# Patient Record
Sex: FEMALE | Race: WHITE | NOT HISPANIC OR LATINO | Employment: OTHER | URBAN - METROPOLITAN AREA
[De-identification: names, ages, dates, MRNs, and addresses within clinical notes are randomized per-mention and may not be internally consistent; named-entity substitution may affect disease eponyms.]

---

## 2017-01-17 ENCOUNTER — APPOINTMENT (INPATIENT)
Dept: CT IMAGING | Facility: HOSPITAL | Age: 57
DRG: 682 | End: 2017-01-17
Payer: MEDICARE

## 2017-01-17 ENCOUNTER — HOSPITAL ENCOUNTER (INPATIENT)
Facility: HOSPITAL | Age: 57
LOS: 3 days | Discharge: HOME | DRG: 682 | End: 2017-01-20
Attending: EMERGENCY MEDICINE | Admitting: INTERNAL MEDICINE
Payer: MEDICARE

## 2017-01-17 DIAGNOSIS — E86.1 HYPOVOLEMIA: ICD-10-CM

## 2017-01-17 DIAGNOSIS — R63.4 RAPID WEIGHT LOSS: ICD-10-CM

## 2017-01-17 DIAGNOSIS — E87.6 HYPOKALEMIA: Primary | ICD-10-CM

## 2017-01-17 PROBLEM — F31.81 BIPOLAR 2 DISORDER (HCC): Chronic | Status: ACTIVE | Noted: 2017-01-17

## 2017-01-17 PROBLEM — N17.9 ACUTE KIDNEY INJURY (HCC): Status: ACTIVE | Noted: 2017-01-17

## 2017-01-17 PROBLEM — E78.00 HYPERCHOLESTEREMIA: Chronic | Status: ACTIVE | Noted: 2017-01-17

## 2017-01-17 LAB
ALBUMIN SERPL BCP-MCNC: 3 G/DL (ref 3.5–5)
ALP SERPL-CCNC: 178 U/L (ref 46–116)
ALT SERPL W P-5'-P-CCNC: 76 U/L (ref 12–78)
ANION GAP SERPL CALCULATED.3IONS-SCNC: 16 MMOL/L (ref 4–13)
AST SERPL W P-5'-P-CCNC: 64 U/L (ref 5–45)
BASOPHILS # BLD AUTO: 0.03 THOUSANDS/ΜL (ref 0–0.1)
BASOPHILS NFR BLD AUTO: 0 % (ref 0–1)
BILIRUB SERPL-MCNC: 0.3 MG/DL (ref 0.2–1)
BUN SERPL-MCNC: 29 MG/DL (ref 5–25)
CALCIUM SERPL-MCNC: 8.6 MG/DL (ref 8.3–10.1)
CHLORIDE SERPL-SCNC: 102 MMOL/L (ref 100–108)
CO2 SERPL-SCNC: 18 MMOL/L (ref 21–32)
CREAT SERPL-MCNC: 4.19 MG/DL (ref 0.6–1.3)
EOSINOPHIL # BLD AUTO: 0.05 THOUSAND/ΜL (ref 0–0.61)
EOSINOPHIL NFR BLD AUTO: 1 % (ref 0–6)
ERYTHROCYTE [DISTWIDTH] IN BLOOD BY AUTOMATED COUNT: 14.4 % (ref 11.6–15.1)
GFR SERPL CREATININE-BSD FRML MDRD: 11.1 ML/MIN/1.73SQ M
GLUCOSE SERPL-MCNC: 119 MG/DL (ref 65–140)
HCT VFR BLD AUTO: 37.6 % (ref 34.8–46.1)
HGB BLD-MCNC: 13.3 G/DL (ref 11.5–15.4)
LYMPHOCYTES # BLD AUTO: 0.99 THOUSANDS/ΜL (ref 0.6–4.47)
LYMPHOCYTES NFR BLD AUTO: 10 % (ref 14–44)
MAGNESIUM SERPL-MCNC: 1.9 MG/DL (ref 1.6–2.6)
MCH RBC QN AUTO: 31.7 PG (ref 26.8–34.3)
MCHC RBC AUTO-ENTMCNC: 35.4 G/DL (ref 31.4–37.4)
MCV RBC AUTO: 90 FL (ref 82–98)
MONOCYTES # BLD AUTO: 0.64 THOUSAND/ΜL (ref 0.17–1.22)
MONOCYTES NFR BLD AUTO: 7 % (ref 4–12)
NEUTROPHILS # BLD AUTO: 8.13 THOUSANDS/ΜL (ref 1.85–7.62)
NEUTS SEG NFR BLD AUTO: 82 % (ref 43–75)
PLATELET # BLD AUTO: 374 THOUSANDS/UL (ref 149–390)
PMV BLD AUTO: 10.4 FL (ref 8.9–12.7)
POTASSIUM SERPL-SCNC: 1.5 MMOL/L (ref 3.5–5.3)
PROT SERPL-MCNC: 6.3 G/DL (ref 6.4–8.2)
RBC # BLD AUTO: 4.19 MILLION/UL (ref 3.81–5.12)
SODIUM SERPL-SCNC: 136 MMOL/L (ref 136–145)
WBC # BLD AUTO: 9.84 THOUSAND/UL (ref 4.31–10.16)

## 2017-01-17 PROCEDURE — C9113 INJ PANTOPRAZOLE SODIUM, VIA: HCPCS | Performed by: INTERNAL MEDICINE

## 2017-01-17 PROCEDURE — 80053 COMPREHEN METABOLIC PANEL: CPT | Performed by: EMERGENCY MEDICINE

## 2017-01-17 PROCEDURE — 99285 EMERGENCY DEPT VISIT HI MDM: CPT

## 2017-01-17 PROCEDURE — 96361 HYDRATE IV INFUSION ADD-ON: CPT

## 2017-01-17 PROCEDURE — 96365 THER/PROPH/DIAG IV INF INIT: CPT

## 2017-01-17 PROCEDURE — 93005 ELECTROCARDIOGRAM TRACING: CPT

## 2017-01-17 PROCEDURE — 83735 ASSAY OF MAGNESIUM: CPT | Performed by: EMERGENCY MEDICINE

## 2017-01-17 PROCEDURE — 74176 CT ABD & PELVIS W/O CONTRAST: CPT

## 2017-01-17 PROCEDURE — 93005 ELECTROCARDIOGRAM TRACING: CPT | Performed by: EMERGENCY MEDICINE

## 2017-01-17 PROCEDURE — 36415 COLL VENOUS BLD VENIPUNCTURE: CPT | Performed by: EMERGENCY MEDICINE

## 2017-01-17 PROCEDURE — 85025 COMPLETE CBC W/AUTO DIFF WBC: CPT | Performed by: EMERGENCY MEDICINE

## 2017-01-17 RX ORDER — POTASSIUM CHLORIDE 29.8 MG/ML
40 INJECTION INTRAVENOUS ONCE
Status: DISCONTINUED | OUTPATIENT
Start: 2017-01-17 | End: 2017-01-17

## 2017-01-17 RX ORDER — SODIUM CHLORIDE AND POTASSIUM CHLORIDE .9; .15 G/100ML; G/100ML
125 SOLUTION INTRAVENOUS CONTINUOUS
Status: DISCONTINUED | OUTPATIENT
Start: 2017-01-17 | End: 2017-01-18

## 2017-01-17 RX ORDER — POTASSIUM CHLORIDE 14.9 MG/ML
20 INJECTION INTRAVENOUS
Status: COMPLETED | OUTPATIENT
Start: 2017-01-17 | End: 2017-01-18

## 2017-01-17 RX ORDER — POTASSIUM CHLORIDE 20 MEQ/1
20 TABLET, EXTENDED RELEASE ORAL ONCE
Status: COMPLETED | OUTPATIENT
Start: 2017-01-17 | End: 2017-01-17

## 2017-01-17 RX ORDER — POTASSIUM CHLORIDE 14.9 MG/ML
INJECTION INTRAVENOUS
Status: COMPLETED
Start: 2017-01-17 | End: 2017-01-17

## 2017-01-17 RX ORDER — ACETAMINOPHEN 325 MG/1
650 TABLET ORAL EVERY 6 HOURS PRN
Status: DISCONTINUED | OUTPATIENT
Start: 2017-01-17 | End: 2017-01-20 | Stop reason: HOSPADM

## 2017-01-17 RX ORDER — PANTOPRAZOLE SODIUM 40 MG/1
40 INJECTION, POWDER, FOR SOLUTION INTRAVENOUS EVERY 12 HOURS SCHEDULED
Status: DISCONTINUED | OUTPATIENT
Start: 2017-01-17 | End: 2017-01-20 | Stop reason: HOSPADM

## 2017-01-17 RX ORDER — ONDANSETRON 2 MG/ML
4 INJECTION INTRAMUSCULAR; INTRAVENOUS EVERY 6 HOURS PRN
Status: DISCONTINUED | OUTPATIENT
Start: 2017-01-17 | End: 2017-01-20 | Stop reason: HOSPADM

## 2017-01-17 RX ORDER — POTASSIUM CHLORIDE 14.9 MG/ML
20 INJECTION INTRAVENOUS
Status: DISCONTINUED | OUTPATIENT
Start: 2017-01-17 | End: 2017-01-18

## 2017-01-17 RX ORDER — HEPARIN SODIUM 5000 [USP'U]/ML
5000 INJECTION, SOLUTION INTRAVENOUS; SUBCUTANEOUS EVERY 8 HOURS SCHEDULED
Status: DISCONTINUED | OUTPATIENT
Start: 2017-01-17 | End: 2017-01-20 | Stop reason: HOSPADM

## 2017-01-17 RX ORDER — SIMETHICONE 80 MG
80 TABLET,CHEWABLE ORAL 4 TIMES DAILY PRN
Status: DISCONTINUED | OUTPATIENT
Start: 2017-01-17 | End: 2017-01-20 | Stop reason: HOSPADM

## 2017-01-17 RX ADMIN — POTASSIUM CHLORIDE 20 MEQ: 200 INJECTION, SOLUTION INTRAVENOUS at 21:00

## 2017-01-17 RX ADMIN — SODIUM CHLORIDE 500 ML: 0.9 INJECTION, SOLUTION INTRAVENOUS at 14:24

## 2017-01-17 RX ADMIN — POTASSIUM CHLORIDE 20 MEQ: 200 INJECTION, SOLUTION INTRAVENOUS at 18:19

## 2017-01-17 RX ADMIN — SODIUM CHLORIDE 1000 ML: 0.9 INJECTION, SOLUTION INTRAVENOUS at 15:06

## 2017-01-17 RX ADMIN — IOHEXOL 50 ML: 240 INJECTION, SOLUTION INTRATHECAL; INTRAVASCULAR; INTRAVENOUS; ORAL at 19:00

## 2017-01-17 RX ADMIN — POTASSIUM CHLORIDE 20 MEQ: 200 INJECTION, SOLUTION INTRAVENOUS at 15:03

## 2017-01-17 RX ADMIN — HEPARIN SODIUM 5000 UNITS: 5000 INJECTION, SOLUTION INTRAVENOUS; SUBCUTANEOUS at 21:07

## 2017-01-17 RX ADMIN — POTASSIUM CHLORIDE 20 MEQ: 14.9 INJECTION INTRAVENOUS at 15:03

## 2017-01-17 RX ADMIN — SODIUM CHLORIDE AND POTASSIUM CHLORIDE 125 ML/HR: .9; .15 SOLUTION INTRAVENOUS at 18:19

## 2017-01-17 RX ADMIN — PANTOPRAZOLE SODIUM 40 MG: 40 INJECTION, POWDER, FOR SOLUTION INTRAVENOUS at 21:00

## 2017-01-17 RX ADMIN — POTASSIUM CHLORIDE 20 MEQ: 1500 TABLET, EXTENDED RELEASE ORAL at 15:02

## 2017-01-18 PROBLEM — E87.29 METABOLIC ACIDOSIS, INCREASED ANION GAP: Status: ACTIVE | Noted: 2017-01-18

## 2017-01-18 PROBLEM — E87.2 METABOLIC ACIDOSIS, INCREASED ANION GAP: Status: ACTIVE | Noted: 2017-01-18

## 2017-01-18 LAB
ANION GAP SERPL CALCULATED.3IONS-SCNC: 11 MMOL/L (ref 4–13)
ANION GAP SERPL CALCULATED.3IONS-SCNC: 12 MMOL/L (ref 4–13)
ANION GAP SERPL CALCULATED.3IONS-SCNC: 14 MMOL/L (ref 4–13)
BACTERIA UR QL AUTO: ABNORMAL /HPF
BILIRUB UR QL STRIP: NEGATIVE
BUN SERPL-MCNC: 19 MG/DL (ref 5–25)
BUN SERPL-MCNC: 25 MG/DL (ref 5–25)
BUN SERPL-MCNC: 26 MG/DL (ref 5–25)
C DIFF TOX GENS STL QL NAA+PROBE: NORMAL
CALCIUM SERPL-MCNC: 7.6 MG/DL (ref 8.3–10.1)
CALCIUM SERPL-MCNC: 7.7 MG/DL (ref 8.3–10.1)
CALCIUM SERPL-MCNC: 7.8 MG/DL (ref 8.3–10.1)
CHLORIDE SERPL-SCNC: 106 MMOL/L (ref 100–108)
CHLORIDE SERPL-SCNC: 112 MMOL/L (ref 100–108)
CHLORIDE SERPL-SCNC: 112 MMOL/L (ref 100–108)
CLARITY UR: CLEAR
CO2 SERPL-SCNC: 16 MMOL/L (ref 21–32)
CO2 SERPL-SCNC: 18 MMOL/L (ref 21–32)
CO2 SERPL-SCNC: 19 MMOL/L (ref 21–32)
COLOR UR: YELLOW
CREAT SERPL-MCNC: 3.01 MG/DL (ref 0.6–1.3)
CREAT SERPL-MCNC: 3.28 MG/DL (ref 0.6–1.3)
CREAT SERPL-MCNC: 3.53 MG/DL (ref 0.6–1.3)
ERYTHROCYTE [DISTWIDTH] IN BLOOD BY AUTOMATED COUNT: 14.9 % (ref 11.6–15.1)
GFR SERPL CREATININE-BSD FRML MDRD: 13.5 ML/MIN/1.73SQ M
GFR SERPL CREATININE-BSD FRML MDRD: 14.7 ML/MIN/1.73SQ M
GFR SERPL CREATININE-BSD FRML MDRD: 16.2 ML/MIN/1.73SQ M
GLUCOSE SERPL-MCNC: 102 MG/DL (ref 65–140)
GLUCOSE SERPL-MCNC: 170 MG/DL (ref 65–140)
GLUCOSE SERPL-MCNC: 86 MG/DL (ref 65–140)
GLUCOSE UR STRIP-MCNC: NEGATIVE MG/DL
HCT VFR BLD AUTO: 33.4 % (ref 34.8–46.1)
HGB BLD-MCNC: 11.5 G/DL (ref 11.5–15.4)
HGB UR QL STRIP.AUTO: ABNORMAL
KETONES UR STRIP-MCNC: NEGATIVE MG/DL
LACTATE SERPL-SCNC: 1.6 MMOL/L (ref 0.5–2)
LEUKOCYTE ESTERASE UR QL STRIP: NEGATIVE
MAGNESIUM SERPL-MCNC: 1.9 MG/DL (ref 1.6–2.6)
MCH RBC QN AUTO: 31.9 PG (ref 26.8–34.3)
MCHC RBC AUTO-ENTMCNC: 34.4 G/DL (ref 31.4–37.4)
MCV RBC AUTO: 93 FL (ref 82–98)
NITRITE UR QL STRIP: NEGATIVE
NON-SQ EPI CELLS URNS QL MICRO: ABNORMAL /HPF
PH UR STRIP.AUTO: 6 [PH] (ref 4.5–8)
PLATELET # BLD AUTO: 317 THOUSANDS/UL (ref 149–390)
PLATELET # BLD AUTO: 318 THOUSANDS/UL (ref 149–390)
PMV BLD AUTO: 10.7 FL (ref 8.9–12.7)
PMV BLD AUTO: 10.9 FL (ref 8.9–12.7)
POTASSIUM SERPL-SCNC: 1.7 MMOL/L (ref 3.5–5.3)
POTASSIUM SERPL-SCNC: 2.4 MMOL/L (ref 3.5–5.3)
POTASSIUM SERPL-SCNC: 2.7 MMOL/L (ref 3.5–5.3)
PROT UR STRIP-MCNC: NEGATIVE MG/DL
RBC # BLD AUTO: 3.6 MILLION/UL (ref 3.81–5.12)
RBC #/AREA URNS AUTO: ABNORMAL /HPF
SODIUM SERPL-SCNC: 136 MMOL/L (ref 136–145)
SODIUM SERPL-SCNC: 142 MMOL/L (ref 136–145)
SODIUM SERPL-SCNC: 142 MMOL/L (ref 136–145)
SP GR UR STRIP.AUTO: <=1.005 (ref 1–1.03)
TSH SERPL DL<=0.05 MIU/L-ACNC: 2.3 UIU/ML (ref 0.36–3.74)
UROBILINOGEN UR QL STRIP.AUTO: 0.2 E.U./DL
WBC # BLD AUTO: 6.14 THOUSAND/UL (ref 4.31–10.16)
WBC #/AREA URNS AUTO: ABNORMAL /HPF

## 2017-01-18 PROCEDURE — 87177 OVA AND PARASITES SMEARS: CPT | Performed by: INTERNAL MEDICINE

## 2017-01-18 PROCEDURE — 83605 ASSAY OF LACTIC ACID: CPT | Performed by: INTERNAL MEDICINE

## 2017-01-18 PROCEDURE — 85049 AUTOMATED PLATELET COUNT: CPT | Performed by: INTERNAL MEDICINE

## 2017-01-18 PROCEDURE — 83735 ASSAY OF MAGNESIUM: CPT | Performed by: INTERNAL MEDICINE

## 2017-01-18 PROCEDURE — 80048 BASIC METABOLIC PNL TOTAL CA: CPT | Performed by: INTERNAL MEDICINE

## 2017-01-18 PROCEDURE — 87077 CULTURE AEROBIC IDENTIFY: CPT | Performed by: INTERNAL MEDICINE

## 2017-01-18 PROCEDURE — C9113 INJ PANTOPRAZOLE SODIUM, VIA: HCPCS | Performed by: INTERNAL MEDICINE

## 2017-01-18 PROCEDURE — 81001 URINALYSIS AUTO W/SCOPE: CPT | Performed by: INTERNAL MEDICINE

## 2017-01-18 PROCEDURE — 87493 C DIFF AMPLIFIED PROBE: CPT | Performed by: INTERNAL MEDICINE

## 2017-01-18 PROCEDURE — 87086 URINE CULTURE/COLONY COUNT: CPT | Performed by: INTERNAL MEDICINE

## 2017-01-18 PROCEDURE — 87186 SC STD MICRODIL/AGAR DIL: CPT | Performed by: INTERNAL MEDICINE

## 2017-01-18 PROCEDURE — 84443 ASSAY THYROID STIM HORMONE: CPT | Performed by: INTERNAL MEDICINE

## 2017-01-18 PROCEDURE — 87209 SMEAR COMPLEX STAIN: CPT | Performed by: INTERNAL MEDICINE

## 2017-01-18 PROCEDURE — 85027 COMPLETE CBC AUTOMATED: CPT | Performed by: INTERNAL MEDICINE

## 2017-01-18 PROCEDURE — 82705 FATS/LIPIDS FECES QUAL: CPT | Performed by: INTERNAL MEDICINE

## 2017-01-18 PROCEDURE — 80048 BASIC METABOLIC PNL TOTAL CA: CPT | Performed by: NURSE PRACTITIONER

## 2017-01-18 RX ORDER — POTASSIUM CHLORIDE 29.8 MG/ML
40 INJECTION INTRAVENOUS
Status: DISCONTINUED | OUTPATIENT
Start: 2017-01-18 | End: 2017-01-18

## 2017-01-18 RX ORDER — LAMOTRIGINE 100 MG/1
200 TABLET ORAL
Status: DISCONTINUED | OUTPATIENT
Start: 2017-01-19 | End: 2017-01-20 | Stop reason: HOSPADM

## 2017-01-18 RX ORDER — CLONAZEPAM 0.5 MG/1
0.5 TABLET ORAL 3 TIMES DAILY PRN
Status: DISCONTINUED | OUTPATIENT
Start: 2017-01-18 | End: 2017-01-18

## 2017-01-18 RX ORDER — POTASSIUM CHLORIDE 14.9 MG/ML
20 INJECTION INTRAVENOUS ONCE
Status: COMPLETED | OUTPATIENT
Start: 2017-01-18 | End: 2017-01-18

## 2017-01-18 RX ORDER — POTASSIUM CHLORIDE 14.9 MG/ML
20 INJECTION INTRAVENOUS
Status: COMPLETED | OUTPATIENT
Start: 2017-01-18 | End: 2017-01-19

## 2017-01-18 RX ORDER — SODIUM CHLORIDE, SODIUM LACTATE, POTASSIUM CHLORIDE, CALCIUM CHLORIDE 600; 310; 30; 20 MG/100ML; MG/100ML; MG/100ML; MG/100ML
100 INJECTION, SOLUTION INTRAVENOUS CONTINUOUS
Status: DISCONTINUED | OUTPATIENT
Start: 2017-01-18 | End: 2017-01-19

## 2017-01-18 RX ORDER — CLONAZEPAM 0.5 MG/1
0.5 TABLET ORAL 4 TIMES DAILY
Status: DISCONTINUED | OUTPATIENT
Start: 2017-01-18 | End: 2017-01-20 | Stop reason: HOSPADM

## 2017-01-18 RX ORDER — POTASSIUM CHLORIDE 20 MEQ/1
40 TABLET, EXTENDED RELEASE ORAL ONCE
Status: COMPLETED | OUTPATIENT
Start: 2017-01-18 | End: 2017-01-18

## 2017-01-18 RX ORDER — POTASSIUM CHLORIDE 29.8 MG/ML
40 INJECTION INTRAVENOUS ONCE
Status: DISCONTINUED | OUTPATIENT
Start: 2017-01-18 | End: 2017-01-18 | Stop reason: CLARIF

## 2017-01-18 RX ORDER — LAMOTRIGINE 100 MG/1
200 TABLET ORAL DAILY
Status: DISCONTINUED | OUTPATIENT
Start: 2017-01-18 | End: 2017-01-18

## 2017-01-18 RX ORDER — CLOMIPRAMINE HYDROCHLORIDE 25 MG/1
150 CAPSULE ORAL
Status: DISCONTINUED | OUTPATIENT
Start: 2017-01-18 | End: 2017-01-20 | Stop reason: HOSPADM

## 2017-01-18 RX ORDER — POTASSIUM CHLORIDE 14.9 MG/ML
20 INJECTION INTRAVENOUS
Status: COMPLETED | OUTPATIENT
Start: 2017-01-18 | End: 2017-01-18

## 2017-01-18 RX ORDER — POTASSIUM CHLORIDE 20 MEQ/1
40 TABLET, EXTENDED RELEASE ORAL 2 TIMES DAILY
Status: COMPLETED | OUTPATIENT
Start: 2017-01-18 | End: 2017-01-18

## 2017-01-18 RX ADMIN — HEPARIN SODIUM 5000 UNITS: 5000 INJECTION, SOLUTION INTRAVENOUS; SUBCUTANEOUS at 14:10

## 2017-01-18 RX ADMIN — CLOMIPRAMINE HYDROCHLORIDE 150 MG: 25 CAPSULE ORAL at 21:36

## 2017-01-18 RX ADMIN — HEPARIN SODIUM 5000 UNITS: 5000 INJECTION, SOLUTION INTRAVENOUS; SUBCUTANEOUS at 05:56

## 2017-01-18 RX ADMIN — POTASSIUM CHLORIDE 20 MEQ: 200 INJECTION, SOLUTION INTRAVENOUS at 01:41

## 2017-01-18 RX ADMIN — POTASSIUM CHLORIDE 40 MEQ: 1500 TABLET, EXTENDED RELEASE ORAL at 08:57

## 2017-01-18 RX ADMIN — CLONAZEPAM 0.5 MG: 0.5 TABLET ORAL at 21:33

## 2017-01-18 RX ADMIN — PANTOPRAZOLE SODIUM 40 MG: 40 INJECTION, POWDER, FOR SOLUTION INTRAVENOUS at 20:33

## 2017-01-18 RX ADMIN — QUETIAPINE FUMARATE 700 MG: 300 TABLET, FILM COATED ORAL at 21:33

## 2017-01-18 RX ADMIN — LAMOTRIGINE 200 MG: 100 TABLET ORAL at 10:44

## 2017-01-18 RX ADMIN — POTASSIUM CHLORIDE 20 MEQ: 200 INJECTION, SOLUTION INTRAVENOUS at 02:00

## 2017-01-18 RX ADMIN — POTASSIUM CHLORIDE 40 MEQ: 1500 TABLET, EXTENDED RELEASE ORAL at 01:41

## 2017-01-18 RX ADMIN — POTASSIUM CHLORIDE 40 MEQ: 1500 TABLET, EXTENDED RELEASE ORAL at 17:15

## 2017-01-18 RX ADMIN — POTASSIUM CHLORIDE 20 MEQ: 200 INJECTION, SOLUTION INTRAVENOUS at 09:00

## 2017-01-18 RX ADMIN — HEPARIN SODIUM 5000 UNITS: 5000 INJECTION, SOLUTION INTRAVENOUS; SUBCUTANEOUS at 21:33

## 2017-01-18 RX ADMIN — POTASSIUM CHLORIDE 20 MEQ: 200 INJECTION, SOLUTION INTRAVENOUS at 20:33

## 2017-01-18 RX ADMIN — SODIUM CHLORIDE, SODIUM LACTATE, POTASSIUM CHLORIDE, AND CALCIUM CHLORIDE 100 ML/HR: .6; .31; .03; .02 INJECTION, SOLUTION INTRAVENOUS at 08:54

## 2017-01-18 RX ADMIN — POTASSIUM CHLORIDE 20 MEQ: 200 INJECTION, SOLUTION INTRAVENOUS at 10:42

## 2017-01-18 RX ADMIN — SODIUM CHLORIDE, SODIUM LACTATE, POTASSIUM CHLORIDE, AND CALCIUM CHLORIDE 100 ML/HR: .6; .31; .03; .02 INJECTION, SOLUTION INTRAVENOUS at 19:45

## 2017-01-18 RX ADMIN — PANTOPRAZOLE SODIUM 40 MG: 40 INJECTION, POWDER, FOR SOLUTION INTRAVENOUS at 08:57

## 2017-01-18 RX ADMIN — POTASSIUM CHLORIDE 20 MEQ: 200 INJECTION, SOLUTION INTRAVENOUS at 22:41

## 2017-01-18 RX ADMIN — SODIUM CHLORIDE AND POTASSIUM CHLORIDE 125 ML/HR: .9; .15 SOLUTION INTRAVENOUS at 03:07

## 2017-01-19 PROBLEM — E43 SEVERE PROTEIN-CALORIE MALNUTRITION (HCC): Status: ACTIVE | Noted: 2017-01-19

## 2017-01-19 LAB
ANION GAP SERPL CALCULATED.3IONS-SCNC: 11 MMOL/L (ref 4–13)
ATRIAL RATE: 88 BPM
ATRIAL RATE: 89 BPM
BUN SERPL-MCNC: 17 MG/DL (ref 5–25)
CALCIUM SERPL-MCNC: 8.1 MG/DL (ref 8.3–10.1)
CHLORIDE SERPL-SCNC: 116 MMOL/L (ref 100–108)
CO2 SERPL-SCNC: 16 MMOL/L (ref 21–32)
CREAT SERPL-MCNC: 2.52 MG/DL (ref 0.6–1.3)
CREAT UR-MCNC: <13 MG/DL
GFR SERPL CREATININE-BSD FRML MDRD: 19.9 ML/MIN/1.73SQ M
GLUCOSE SERPL-MCNC: 99 MG/DL (ref 65–140)
P AXIS: 74 DEGREES
P AXIS: 78 DEGREES
PHOSPHATE SERPL-MCNC: 2.1 MG/DL (ref 2.7–4.5)
POTASSIUM SERPL-SCNC: 3.2 MMOL/L (ref 3.5–5.3)
PR INTERVAL: 146 MS
PR INTERVAL: 146 MS
PROT UR-MCNC: 17 MG/DL
PROT/CREAT UR: >1.31 MG/G{CREAT} (ref 0–0.1)
PTH-INTACT SERPL-MCNC: 73.9 PG/ML (ref 14–72)
QRS AXIS: 104 DEGREES
QRS AXIS: 106 DEGREES
QRSD INTERVAL: 124 MS
QRSD INTERVAL: 126 MS
QT INTERVAL: 510 MS
QT INTERVAL: 516 MS
QTC INTERVAL: 617 MS
QTC INTERVAL: 627 MS
SODIUM SERPL-SCNC: 143 MMOL/L (ref 136–145)
T WAVE AXIS: 70 DEGREES
T WAVE AXIS: 71 DEGREES
VENTRICULAR RATE: 88 BPM
VENTRICULAR RATE: 89 BPM

## 2017-01-19 PROCEDURE — C9113 INJ PANTOPRAZOLE SODIUM, VIA: HCPCS | Performed by: INTERNAL MEDICINE

## 2017-01-19 PROCEDURE — 84100 ASSAY OF PHOSPHORUS: CPT | Performed by: INTERNAL MEDICINE

## 2017-01-19 PROCEDURE — 80048 BASIC METABOLIC PNL TOTAL CA: CPT | Performed by: INTERNAL MEDICINE

## 2017-01-19 PROCEDURE — 83970 ASSAY OF PARATHORMONE: CPT | Performed by: INTERNAL MEDICINE

## 2017-01-19 PROCEDURE — 82570 ASSAY OF URINE CREATININE: CPT | Performed by: INTERNAL MEDICINE

## 2017-01-19 PROCEDURE — 84156 ASSAY OF PROTEIN URINE: CPT | Performed by: INTERNAL MEDICINE

## 2017-01-19 RX ORDER — POTASSIUM CHLORIDE 20 MEQ/1
40 TABLET, EXTENDED RELEASE ORAL
Status: COMPLETED | OUTPATIENT
Start: 2017-01-19 | End: 2017-01-19

## 2017-01-19 RX ORDER — CIPROFLOXACIN 500 MG/1
500 TABLET, FILM COATED ORAL EVERY 24 HOURS
Status: DISCONTINUED | OUTPATIENT
Start: 2017-01-19 | End: 2017-01-20 | Stop reason: HOSPADM

## 2017-01-19 RX ADMIN — PANTOPRAZOLE SODIUM 40 MG: 40 INJECTION, POWDER, FOR SOLUTION INTRAVENOUS at 09:31

## 2017-01-19 RX ADMIN — QUETIAPINE FUMARATE 700 MG: 300 TABLET, FILM COATED ORAL at 21:25

## 2017-01-19 RX ADMIN — CLONAZEPAM 0.5 MG: 0.5 TABLET ORAL at 21:25

## 2017-01-19 RX ADMIN — HEPARIN SODIUM 5000 UNITS: 5000 INJECTION, SOLUTION INTRAVENOUS; SUBCUTANEOUS at 21:25

## 2017-01-19 RX ADMIN — POTASSIUM CHLORIDE 40 MEQ: 1500 TABLET, EXTENDED RELEASE ORAL at 17:03

## 2017-01-19 RX ADMIN — PANTOPRAZOLE SODIUM 40 MG: 40 INJECTION, POWDER, FOR SOLUTION INTRAVENOUS at 21:25

## 2017-01-19 RX ADMIN — POTASSIUM CHLORIDE 40 MEQ: 1500 TABLET, EXTENDED RELEASE ORAL at 09:44

## 2017-01-19 RX ADMIN — CLONAZEPAM 0.5 MG: 0.5 TABLET ORAL at 12:18

## 2017-01-19 RX ADMIN — CLONAZEPAM 0.5 MG: 0.5 TABLET ORAL at 09:31

## 2017-01-19 RX ADMIN — HEPARIN SODIUM 5000 UNITS: 5000 INJECTION, SOLUTION INTRAVENOUS; SUBCUTANEOUS at 14:03

## 2017-01-19 RX ADMIN — CLOMIPRAMINE HYDROCHLORIDE 150 MG: 25 CAPSULE ORAL at 21:29

## 2017-01-19 RX ADMIN — HEPARIN SODIUM 5000 UNITS: 5000 INJECTION, SOLUTION INTRAVENOUS; SUBCUTANEOUS at 06:26

## 2017-01-19 RX ADMIN — POTASSIUM CHLORIDE 40 MEQ: 1500 TABLET, EXTENDED RELEASE ORAL at 12:18

## 2017-01-19 RX ADMIN — LAMOTRIGINE 200 MG: 100 TABLET ORAL at 21:25

## 2017-01-19 RX ADMIN — SODIUM CHLORIDE, SODIUM LACTATE, POTASSIUM CHLORIDE, AND CALCIUM CHLORIDE 100 ML/HR: .6; .31; .03; .02 INJECTION, SOLUTION INTRAVENOUS at 06:26

## 2017-01-19 RX ADMIN — CLONAZEPAM 0.5 MG: 0.5 TABLET ORAL at 17:03

## 2017-01-19 RX ADMIN — CIPROFLOXACIN HYDROCHLORIDE 500 MG: 500 TABLET, FILM COATED ORAL at 14:12

## 2017-01-19 RX ADMIN — POTASSIUM PHOSPHATE, MONOBASIC AND POTASSIUM PHOSPHATE, DIBASIC 21 MMOL: 224; 236 INJECTION, SOLUTION, CONCENTRATE INTRAVENOUS at 14:04

## 2017-01-20 VITALS
BODY MASS INDEX: 21 KG/M2 | OXYGEN SATURATION: 99 % | WEIGHT: 133.82 LBS | DIASTOLIC BLOOD PRESSURE: 53 MMHG | TEMPERATURE: 97.8 F | RESPIRATION RATE: 20 BRPM | SYSTOLIC BLOOD PRESSURE: 103 MMHG | HEIGHT: 67 IN | HEART RATE: 83 BPM

## 2017-01-20 PROBLEM — E43 SEVERE PROTEIN-CALORIE MALNUTRITION (HCC): Status: RESOLVED | Noted: 2017-01-19 | Resolved: 2017-01-20

## 2017-01-20 PROBLEM — R63.4 RAPID WEIGHT LOSS: Status: RESOLVED | Noted: 2017-01-17 | Resolved: 2017-01-20

## 2017-01-20 PROBLEM — M43.17 SPONDYLOLISTHESIS AT L5-S1 LEVEL: Status: ACTIVE | Noted: 2017-01-20

## 2017-01-20 PROBLEM — N17.9 ACUTE KIDNEY INJURY (HCC): Status: RESOLVED | Noted: 2017-01-17 | Resolved: 2017-01-20

## 2017-01-20 PROBLEM — E87.29 METABOLIC ACIDOSIS, INCREASED ANION GAP: Status: RESOLVED | Noted: 2017-01-18 | Resolved: 2017-01-20

## 2017-01-20 PROBLEM — E87.6 HYPOKALEMIA: Status: RESOLVED | Noted: 2017-01-17 | Resolved: 2017-01-20

## 2017-01-20 PROBLEM — E87.2 METABOLIC ACIDOSIS, INCREASED ANION GAP: Status: RESOLVED | Noted: 2017-01-18 | Resolved: 2017-01-20

## 2017-01-20 LAB
ANION GAP SERPL CALCULATED.3IONS-SCNC: 10 MMOL/L (ref 4–13)
BACTERIA UR CULT: NORMAL
BACTERIA UR CULT: NORMAL
BUN SERPL-MCNC: 17 MG/DL (ref 5–25)
CALCIUM SERPL-MCNC: 8 MG/DL (ref 8.3–10.1)
CHLORIDE SERPL-SCNC: 119 MMOL/L (ref 100–108)
CO2 SERPL-SCNC: 16 MMOL/L (ref 21–32)
CREAT SERPL-MCNC: 2.12 MG/DL (ref 0.6–1.3)
GFR SERPL CREATININE-BSD FRML MDRD: 24.3 ML/MIN/1.73SQ M
GLUCOSE SERPL-MCNC: 93 MG/DL (ref 65–140)
PHOSPHATE SERPL-MCNC: 1.8 MG/DL (ref 2.7–4.5)
POTASSIUM SERPL-SCNC: 3.7 MMOL/L (ref 3.5–5.3)
SODIUM SERPL-SCNC: 145 MMOL/L (ref 136–145)

## 2017-01-20 PROCEDURE — C9113 INJ PANTOPRAZOLE SODIUM, VIA: HCPCS | Performed by: INTERNAL MEDICINE

## 2017-01-20 PROCEDURE — 84100 ASSAY OF PHOSPHORUS: CPT | Performed by: INTERNAL MEDICINE

## 2017-01-20 PROCEDURE — 80048 BASIC METABOLIC PNL TOTAL CA: CPT | Performed by: INTERNAL MEDICINE

## 2017-01-20 RX ORDER — SODIUM BICARBONATE 650 MG/1
650 TABLET ORAL
Status: DISCONTINUED | OUTPATIENT
Start: 2017-01-20 | End: 2017-01-20 | Stop reason: HOSPADM

## 2017-01-20 RX ORDER — SODIUM BICARBONATE 650 MG/1
650 TABLET ORAL
Qty: 60 TABLET | Refills: 0 | Status: SHIPPED | OUTPATIENT
Start: 2017-01-20 | End: 2017-02-19

## 2017-01-20 RX ADMIN — CIPROFLOXACIN HYDROCHLORIDE 500 MG: 500 TABLET, FILM COATED ORAL at 13:05

## 2017-01-20 RX ADMIN — HEPARIN SODIUM 5000 UNITS: 5000 INJECTION, SOLUTION INTRAVENOUS; SUBCUTANEOUS at 05:30

## 2017-01-20 RX ADMIN — HEPARIN SODIUM 5000 UNITS: 5000 INJECTION, SOLUTION INTRAVENOUS; SUBCUTANEOUS at 13:04

## 2017-01-20 RX ADMIN — SODIUM BICARBONATE 650 MG TABLET 650 MG: at 09:36

## 2017-01-20 RX ADMIN — CLONAZEPAM 0.5 MG: 0.5 TABLET ORAL at 09:36

## 2017-01-20 RX ADMIN — PANTOPRAZOLE SODIUM 40 MG: 40 INJECTION, POWDER, FOR SOLUTION INTRAVENOUS at 09:36

## 2017-01-20 RX ADMIN — POTASSIUM PHOSPHATE, MONOBASIC AND POTASSIUM PHOSPHATE, DIBASIC 21 MMOL: 224; 236 INJECTION, SOLUTION, CONCENTRATE INTRAVENOUS at 09:45

## 2017-01-20 RX ADMIN — CLONAZEPAM 0.5 MG: 0.5 TABLET ORAL at 13:04

## 2017-01-23 ENCOUNTER — GENERIC CONVERSION - ENCOUNTER (OUTPATIENT)
Dept: OTHER | Facility: OTHER | Age: 57
End: 2017-01-23

## 2017-01-23 LAB
FAT STL QL: NORMAL
NEUTRAL FAT STL QL: NORMAL
O+P STL CONC: NORMAL

## 2017-02-27 DIAGNOSIS — E87.6 HYPOKALEMIA: ICD-10-CM

## 2017-02-27 DIAGNOSIS — N17.9 ACUTE KIDNEY FAILURE (HCC): ICD-10-CM

## 2017-02-28 ENCOUNTER — LAB CONVERSION - ENCOUNTER (OUTPATIENT)
Dept: OTHER | Facility: OTHER | Age: 57
End: 2017-02-28

## 2017-02-28 LAB
ALBUMIN SERPL BCP-MCNC: 4.2 G/DL (ref 3.6–5.1)
BUN SERPL-MCNC: 36 MG/DL (ref 7–25)
BUN/CREA RATIO (HISTORICAL): 19 (CALC) (ref 6–22)
CALCIUM SERPL-MCNC: 9.6 MG/DL (ref 8.6–10.4)
CALCIUM SERPL-MCNC: 9.6 MG/DL (ref 8.6–10.4)
CHLORIDE SERPL-SCNC: 108 MMOL/L (ref 98–110)
CO2 SERPL-SCNC: 28 MMOL/L (ref 20–31)
CREAT SERPL-MCNC: 1.86 MG/DL (ref 0.5–1.05)
CREATININE, RANDOM URINE (HISTORICAL): 43 MG/DL (ref 20–320)
DEPRECATED RDW RBC AUTO: 15.3 % (ref 11–15)
EGFR AFRICAN AMERICAN (HISTORICAL): 35 ML/MIN/1.73M2
EGFR-AMERICAN CALC (HISTORICAL): 30 ML/MIN/1.73M2
GLUCOSE (HISTORICAL): 100 MG/DL (ref 65–99)
HCT VFR BLD AUTO: 34.7 % (ref 35–45)
HGB BLD-MCNC: 11 G/DL (ref 11.7–15.5)
MAGNESIUM SERPL-MCNC: 2.5 MG/DL (ref 1.5–2.5)
MCH RBC QN AUTO: 31.7 PG (ref 27–33)
MCHC RBC AUTO-ENTMCNC: 31.8 G/DL (ref 32–36)
MCV RBC AUTO: 99.8 FL (ref 80–100)
PHOSPHATE SERPL-MCNC: 4.4 MG/DL (ref 2.5–4.5)
PLATELET # BLD AUTO: 290 THOUSAND/UL (ref 140–400)
PMV BLD AUTO: 8.3 FL (ref 7.5–12.5)
POTASSIUM SERPL-SCNC: 4.7 MMOL/L (ref 3.5–5.3)
PROT UR-MCNC: 8 MG/DL (ref 5–24)
PROT/CREAT UR: 186 MG/G CREAT (ref 21–161)
PTH-INTACT SERPL-MCNC: 42 PG/ML (ref 14–64)
RBC # BLD AUTO: 3.48 MILLION/UL (ref 3.8–5.1)
SODIUM SERPL-SCNC: 143 MMOL/L (ref 135–146)
WBC # BLD AUTO: 5.2 THOUSAND/UL (ref 3.8–10.8)

## 2017-03-07 ENCOUNTER — ALLSCRIPTS OFFICE VISIT (OUTPATIENT)
Dept: OTHER | Facility: OTHER | Age: 57
End: 2017-03-07

## 2017-07-07 DIAGNOSIS — N18.30 CHRONIC KIDNEY DISEASE, STAGE III (MODERATE) (HCC): ICD-10-CM

## 2017-10-12 ENCOUNTER — TRANSCRIBE ORDERS (OUTPATIENT)
Dept: ADMINISTRATIVE | Facility: HOSPITAL | Age: 57
End: 2017-10-12

## 2017-10-12 DIAGNOSIS — Z13.820 SCREENING FOR OSTEOPOROSIS: Primary | ICD-10-CM

## 2017-11-07 ENCOUNTER — HOSPITAL ENCOUNTER (INPATIENT)
Facility: HOSPITAL | Age: 57
LOS: 4 days | Discharge: HOME | DRG: 683 | End: 2017-11-11
Attending: EMERGENCY MEDICINE | Admitting: INTERNAL MEDICINE
Payer: MEDICARE

## 2017-11-07 DIAGNOSIS — E87.6 HYPOKALEMIA: Primary | ICD-10-CM

## 2017-11-07 DIAGNOSIS — R94.31 PROLONGED QT INTERVAL: ICD-10-CM

## 2017-11-07 DIAGNOSIS — N28.9 RENAL INSUFFICIENCY: ICD-10-CM

## 2017-11-07 DIAGNOSIS — N18.9 ACUTE KIDNEY INJURY SUPERIMPOSED ON CHRONIC KIDNEY DISEASE (HCC): ICD-10-CM

## 2017-11-07 DIAGNOSIS — N17.9 ACUTE KIDNEY INJURY SUPERIMPOSED ON CHRONIC KIDNEY DISEASE (HCC): ICD-10-CM

## 2017-11-07 DIAGNOSIS — N18.30 CHRONIC KIDNEY DISEASE, STAGE 3 (HCC): ICD-10-CM

## 2017-11-07 LAB
ALBUMIN SERPL BCP-MCNC: 3.2 G/DL (ref 3.5–5)
ALP SERPL-CCNC: 188 U/L (ref 46–116)
ALT SERPL W P-5'-P-CCNC: 28 U/L (ref 12–78)
ANION GAP SERPL CALCULATED.3IONS-SCNC: 11 MMOL/L (ref 4–13)
AST SERPL W P-5'-P-CCNC: 19 U/L (ref 5–45)
BASOPHILS # BLD AUTO: 0.04 THOUSANDS/ΜL (ref 0–0.1)
BASOPHILS NFR BLD AUTO: 1 % (ref 0–1)
BILIRUB SERPL-MCNC: 0.3 MG/DL (ref 0.2–1)
BUN SERPL-MCNC: 21 MG/DL (ref 5–25)
CALCIUM SERPL-MCNC: 8.3 MG/DL (ref 8.3–10.1)
CHLORIDE SERPL-SCNC: 106 MMOL/L (ref 100–108)
CO2 SERPL-SCNC: 20 MMOL/L (ref 21–32)
CREAT SERPL-MCNC: 2.76 MG/DL (ref 0.6–1.3)
EOSINOPHIL # BLD AUTO: 0.09 THOUSAND/ΜL (ref 0–0.61)
EOSINOPHIL NFR BLD AUTO: 1 % (ref 0–6)
ERYTHROCYTE [DISTWIDTH] IN BLOOD BY AUTOMATED COUNT: 13.8 % (ref 11.6–15.1)
GFR SERPL CREATININE-BSD FRML MDRD: 19 ML/MIN/1.73SQ M
GLUCOSE SERPL-MCNC: 93 MG/DL (ref 65–140)
HCT VFR BLD AUTO: 34.2 % (ref 34.8–46.1)
HGB BLD-MCNC: 11.1 G/DL (ref 11.5–15.4)
LYMPHOCYTES # BLD AUTO: 1.66 THOUSANDS/ΜL (ref 0.6–4.47)
LYMPHOCYTES NFR BLD AUTO: 22 % (ref 14–44)
MAGNESIUM SERPL-MCNC: 2.1 MG/DL (ref 1.6–2.6)
MCH RBC QN AUTO: 31.8 PG (ref 26.8–34.3)
MCHC RBC AUTO-ENTMCNC: 32.5 G/DL (ref 31.4–37.4)
MCV RBC AUTO: 98 FL (ref 82–98)
MONOCYTES # BLD AUTO: 0.65 THOUSAND/ΜL (ref 0.17–1.22)
MONOCYTES NFR BLD AUTO: 9 % (ref 4–12)
NEUTROPHILS # BLD AUTO: 5.24 THOUSANDS/ΜL (ref 1.85–7.62)
NEUTS SEG NFR BLD AUTO: 67 % (ref 43–75)
PHOSPHATE SERPL-MCNC: 6.4 MG/DL (ref 2.7–4.5)
PLATELET # BLD AUTO: 233 THOUSANDS/UL (ref 149–390)
PMV BLD AUTO: 11 FL (ref 8.9–12.7)
POTASSIUM SERPL-SCNC: 2.6 MMOL/L (ref 3.5–5.3)
PROT SERPL-MCNC: 6 G/DL (ref 6.4–8.2)
RBC # BLD AUTO: 3.49 MILLION/UL (ref 3.81–5.12)
SODIUM SERPL-SCNC: 137 MMOL/L (ref 136–145)
WBC # BLD AUTO: 7.68 THOUSAND/UL (ref 4.31–10.16)

## 2017-11-07 PROCEDURE — 83735 ASSAY OF MAGNESIUM: CPT | Performed by: EMERGENCY MEDICINE

## 2017-11-07 PROCEDURE — 80053 COMPREHEN METABOLIC PANEL: CPT | Performed by: EMERGENCY MEDICINE

## 2017-11-07 PROCEDURE — 36415 COLL VENOUS BLD VENIPUNCTURE: CPT | Performed by: EMERGENCY MEDICINE

## 2017-11-07 PROCEDURE — 85025 COMPLETE CBC W/AUTO DIFF WBC: CPT | Performed by: EMERGENCY MEDICINE

## 2017-11-07 PROCEDURE — 93005 ELECTROCARDIOGRAM TRACING: CPT | Performed by: EMERGENCY MEDICINE

## 2017-11-07 PROCEDURE — 96365 THER/PROPH/DIAG IV INF INIT: CPT

## 2017-11-07 PROCEDURE — 84100 ASSAY OF PHOSPHORUS: CPT | Performed by: EMERGENCY MEDICINE

## 2017-11-07 PROCEDURE — 99284 EMERGENCY DEPT VISIT MOD MDM: CPT

## 2017-11-07 RX ORDER — CLONAZEPAM 0.5 MG/1
TABLET ORAL 3 TIMES DAILY
COMMUNITY
Start: 2017-03-07 | End: 2019-01-02 | Stop reason: SDUPTHER

## 2017-11-07 RX ORDER — QUETIAPINE FUMARATE 300 MG/1
TABLET, FILM COATED ORAL
Status: ON HOLD | COMMUNITY
End: 2018-02-17 | Stop reason: SDUPTHER

## 2017-11-07 RX ORDER — LAMOTRIGINE 100 MG/1
100 TABLET ORAL 3 TIMES DAILY
Status: DISCONTINUED | OUTPATIENT
Start: 2017-11-07 | End: 2017-11-11 | Stop reason: HOSPADM

## 2017-11-07 RX ORDER — OMEPRAZOLE 40 MG/1
40 CAPSULE, DELAYED RELEASE ORAL
COMMUNITY
Start: 2016-04-25 | End: 2018-06-19 | Stop reason: SDUPTHER

## 2017-11-07 RX ORDER — QUETIAPINE 400 MG/1
400 TABLET, FILM COATED, EXTENDED RELEASE ORAL
Status: DISCONTINUED | OUTPATIENT
Start: 2017-11-07 | End: 2017-11-11 | Stop reason: HOSPADM

## 2017-11-07 RX ORDER — CLONAZEPAM 0.5 MG/1
0.5 TABLET ORAL 3 TIMES DAILY
Status: DISCONTINUED | OUTPATIENT
Start: 2017-11-07 | End: 2017-11-11 | Stop reason: HOSPADM

## 2017-11-07 RX ORDER — ONDANSETRON 2 MG/ML
4 INJECTION INTRAMUSCULAR; INTRAVENOUS EVERY 6 HOURS PRN
Status: DISCONTINUED | OUTPATIENT
Start: 2017-11-07 | End: 2017-11-11 | Stop reason: HOSPADM

## 2017-11-07 RX ORDER — HYDROCODONE BITARTRATE AND ACETAMINOPHEN 5; 325 MG/1; MG/1
2 TABLET ORAL 3 TIMES DAILY
Status: DISCONTINUED | OUTPATIENT
Start: 2017-11-07 | End: 2017-11-11 | Stop reason: HOSPADM

## 2017-11-07 RX ORDER — POTASSIUM CHLORIDE 29.8 MG/ML
40 INJECTION INTRAVENOUS ONCE
Status: DISCONTINUED | OUTPATIENT
Start: 2017-11-07 | End: 2017-11-07 | Stop reason: SDUPTHER

## 2017-11-07 RX ORDER — CLOMIPRAMINE HYDROCHLORIDE 25 MG/1
50 CAPSULE ORAL 3 TIMES DAILY
Status: DISCONTINUED | OUTPATIENT
Start: 2017-11-07 | End: 2017-11-11 | Stop reason: HOSPADM

## 2017-11-07 RX ORDER — CLOMIPRAMINE HYDROCHLORIDE 50 MG/1
100 CAPSULE ORAL
COMMUNITY
Start: 2016-07-07 | End: 2018-03-20 | Stop reason: CLARIF

## 2017-11-07 RX ORDER — PANTOPRAZOLE SODIUM 40 MG/1
40 TABLET, DELAYED RELEASE ORAL
Status: DISCONTINUED | OUTPATIENT
Start: 2017-11-08 | End: 2017-11-11 | Stop reason: HOSPADM

## 2017-11-07 RX ORDER — LAMOTRIGINE 100 MG/1
TABLET ORAL 3 TIMES DAILY
Status: ON HOLD | COMMUNITY
End: 2018-02-17 | Stop reason: SDUPTHER

## 2017-11-07 RX ORDER — QUETIAPINE 400 MG/1
400 TABLET, FILM COATED, EXTENDED RELEASE ORAL
COMMUNITY
Start: 2016-06-19 | End: 2018-07-06 | Stop reason: CLARIF

## 2017-11-07 RX ORDER — SODIUM CHLORIDE AND POTASSIUM CHLORIDE .9; .15 G/100ML; G/100ML
100 SOLUTION INTRAVENOUS CONTINUOUS
Status: DISCONTINUED | OUTPATIENT
Start: 2017-11-07 | End: 2017-11-08

## 2017-11-07 RX ORDER — POTASSIUM CHLORIDE 14.9 MG/ML
20 INJECTION INTRAVENOUS
Status: COMPLETED | OUTPATIENT
Start: 2017-11-07 | End: 2017-11-07

## 2017-11-07 RX ORDER — QUETIAPINE FUMARATE 300 MG/1
300 TABLET, FILM COATED ORAL
Status: DISCONTINUED | OUTPATIENT
Start: 2017-11-07 | End: 2017-11-11 | Stop reason: HOSPADM

## 2017-11-07 RX ADMIN — HYDROCODONE BITARTRATE AND ACETAMINOPHEN 2 TABLET: 5; 325 TABLET ORAL at 22:35

## 2017-11-07 RX ADMIN — SODIUM CHLORIDE 1000 ML: 0.9 INJECTION, SOLUTION INTRAVENOUS at 19:12

## 2017-11-07 RX ADMIN — CLONAZEPAM 0.5 MG: 0.5 TABLET ORAL at 22:36

## 2017-11-07 RX ADMIN — QUETIAPINE FUMARATE 300 MG: 300 TABLET, FILM COATED ORAL at 22:36

## 2017-11-07 RX ADMIN — POTASSIUM CHLORIDE 20 MEQ: 200 INJECTION, SOLUTION INTRAVENOUS at 20:48

## 2017-11-07 RX ADMIN — QUETIAPINE 400 MG: 400 TABLET, EXTENDED RELEASE ORAL at 22:37

## 2017-11-07 RX ADMIN — LAMOTRIGINE 100 MG: 100 TABLET ORAL at 22:36

## 2017-11-07 RX ADMIN — POTASSIUM CHLORIDE 20 MEQ: 200 INJECTION, SOLUTION INTRAVENOUS at 19:12

## 2017-11-07 RX ADMIN — CLOMIPRAMINE HYDROCHLORIDE 50 MG: 25 CAPSULE ORAL at 22:36

## 2017-11-07 RX ADMIN — SODIUM CHLORIDE AND POTASSIUM CHLORIDE 100 ML/HR: .9; .15 SOLUTION INTRAVENOUS at 22:39

## 2017-11-07 NOTE — ED PROVIDER NOTES
History  Chief Complaint   Patient presents with    Abnormal Lab     To ED for evaluation of low potassium/elevated kidney values  Denies any complaints  Patient states that she has been taking a daily potassium suppliment for one month  Patient presents with lab abnormality low potassium  History from patient and her primary care physician  Her primary care physician has been monitoring her hypokalemia and level was 2 6 today so he informed her to go to emergency department  Patient admits to generalized weakness last few days  He states that she is taking potassium chloride 20 mEq three times daily  She agrees that she is taking potassium supplements but missed last 2 days because she ran out  She has history of laxative abuse in the past and states that she still uses laxatives daily to lose weight  Denies CP/ SOB  Prior to Admission Medications   Prescriptions Last Dose Informant Patient Reported? Taking?    Hydrocodone-Acetaminophen (LORCET 10/650 PO)   Yes No   Sig: Take by mouth 3 (three) times a day   QUEtiapine (SEROQUEL XR) 400 mg 24 hr tablet   Yes Yes   Sig: Take by mouth daily at bedtime     QUEtiapine (SEROQUEL) 300 mg tablet   Yes No   Sig: Take by mouth daily at bedtime     clomiPRAMINE (ANAFRANIL) 50 mg capsule   Yes Yes   Sig: Take by mouth   clonazePAM (KLONOPIN) 0 5 mg tablet   Yes Yes   Sig: Take by mouth 3 (three) times a day     lamoTRIgine (LaMICtal) 100 mg tablet   Yes No   Sig: Take by mouth 3 (three) times a day     omeprazole (PriLOSEC) 40 MG capsule   Yes Yes   Sig: Take by mouth      Facility-Administered Medications: None       Past Medical History:   Diagnosis Date    Ankle sprain     left    Bipolar 2 disorder (HCC)     Chronic back pain     Hypercholesteremia     Panic attacks        Past Surgical History:   Procedure Laterality Date    TUBAL LIGATION Bilateral 1997       Family History   Problem Relation Age of Onset    Bipolar disorder Mother    Shaina Castro Heart disease Father     Hypertension Father      I have reviewed and agree with the history as documented  Social History   Substance Use Topics    Smoking status: Never Smoker    Smokeless tobacco: Never Used    Alcohol use No        Review of Systems   Neurological: Positive for weakness  All other systems reviewed and are negative  Physical Exam  ED Triage Vitals [11/07/17 1839]   Temperature Pulse Respirations Blood Pressure SpO2   98 °F (36 7 °C) 80 20 145/77 99 %      Temp Source Heart Rate Source Patient Position - Orthostatic VS BP Location FiO2 (%)   Temporal Monitor Sitting Right arm --      Pain Score       No Pain           Orthostatic Vital Signs  Vitals:    11/10/17 0841 11/10/17 1500 11/10/17 2300 11/10/17 2352   BP: 120/62 120/65 (!) 87/53 102/58   Pulse: 95 91 94 88   Patient Position - Orthostatic VS: Lying Lying Lying        Physical Exam   Constitutional: She is oriented to person, place, and time  She appears well-developed and well-nourished  HENT:   Mouth/Throat: Oropharynx is clear and moist    Eyes: Conjunctivae and EOM are normal  Pupils are equal, round, and reactive to light  Neck: Normal range of motion  Neck supple  No spinous process tenderness present  Cardiovascular: Normal rate, regular rhythm, normal heart sounds and intact distal pulses  Pulmonary/Chest: Effort normal and breath sounds normal  No respiratory distress  She has no wheezes  Abdominal: Soft  Bowel sounds are normal  She exhibits no distension  There is no tenderness  Musculoskeletal: Normal range of motion  Neurological: She is alert and oriented to person, place, and time  She has normal strength  No sensory deficit  GCS eye subscore is 4  GCS verbal subscore is 5  GCS motor subscore is 6  Skin: Skin is warm and dry  No rash noted  Psychiatric: She has a normal mood and affect  She expresses no suicidal plans and no homicidal plans  Nursing note and vitals reviewed        ED Medications  Medications   sodium chloride 0 9 % bolus 1,000 mL (0 mL Intravenous Stopped 11/7/17 2100)   potassium chloride 20 mEq IVPB (premix) (0 mEq Intravenous Stopped 11/7/17 2100)   potassium chloride 20 mEq IVPB (premix) (0 mEq Intravenous Stopped 11/8/17 1200)       Diagnostic Studies  Results Reviewed     Procedure Component Value Units Date/Time    Comprehensive metabolic panel [[id number]]  (Abnormal) Collected:  11/07/17 1912    Lab Status:  Final result Specimen:  Blood from Arm, Left Updated:  11/07/17 1952     Sodium 137 mmol/L      Potassium 2 6 (LL) mmol/L      Chloride 106 mmol/L      CO2 20 (L) mmol/L      Anion Gap 11 mmol/L      BUN 21 mg/dL      Creatinine 2 76 (H) mg/dL      Glucose 93 mg/dL      Calcium 8 3 mg/dL      AST 19 U/L      ALT 28 U/L      Alkaline Phosphatase 188 (H) U/L      Total Protein 6 0 (L) g/dL      Albumin 3 2 (L) g/dL      Total Bilirubin 0 30 mg/dL      eGFR 19 ml/min/1 73sq m     Narrative:         National Kidney Disease Education Program recommendations are as follows:  GFR calculation is accurate only with a steady state creatinine  Chronic Kidney disease less than 60 ml/min/1 73 sq  meters  Kidney failure less than 15 ml/min/1 73 sq  meters      Magnesium [[id number]]  (Normal) Collected:  11/07/17 1912    Lab Status:  Final result Specimen:  Blood from Arm, Left Updated:  11/07/17 1947     Magnesium 2 1 mg/dL     Phosphorus [[id number]]  (Abnormal) Collected:  11/07/17 1912    Lab Status:  Final result Specimen:  Blood from Arm, Left Updated:  11/07/17 1947     Phosphorus 6 4 (H) mg/dL     CBC and differential [[id number]]  (Abnormal) Collected:  11/07/17 1912    Lab Status:  Final result Specimen:  Blood from Arm, Left Updated:  11/07/17 1933     WBC 7 68 Thousand/uL      RBC 3 49 (L) Million/uL      Hemoglobin 11 1 (L) g/dL      Hematocrit 34 2 (L) %      MCV 98 fL      MCH 31 8 pg      MCHC 32 5 g/dL      RDW 13 8 %      MPV 11 0 fL      Platelets 629 Thousands/uL Neutrophils Relative 67 %      Lymphocytes Relative 22 %      Monocytes Relative 9 %      Eosinophils Relative 1 %      Basophils Relative 1 %      Neutrophils Absolute 5 24 Thousands/µL      Lymphocytes Absolute 1 66 Thousands/µL      Monocytes Absolute 0 65 Thousand/µL      Eosinophils Absolute 0 09 Thousand/µL      Basophils Absolute 0 04 Thousands/µL                  No orders to display              Procedures  ECG 12 Lead Documentation  Date/Time: 11/7/2017 6:56 PM  Performed by: Bozena Ocampo by: Lucero Area     Indications / Diagnosis:  Hypokalemia  ECG reviewed by me, the ED Provider: yes    Patient location:  ED  Previous ECG:     Previous ECG:  Compared to current    Comparison ECG info:  1/17    Similarity:  Changes noted  Interpretation:     Interpretation: non-specific    Rate:     ECG rate:  90    ECG rate assessment: normal    Rhythm:     Rhythm: sinus rhythm    Ectopy:     Ectopy: none    QRS:     QRS axis:  Right  Conduction:     Conduction: normal    ST segments:     ST segments:  Normal  T waves:     T waves: normal    Other findings:     Other findings: prolonged qTc interval               Phone Contacts  ED Phone Contact    ED Course  ED Course      signed out to Dr Uzair Rodas potassium, check magnesium and kidney function  If potassium < 2 8 consider admission                            MDM  Number of Diagnoses or Management Options  Hypokalemia: new and requires workup  Prolonged QT interval: new and requires workup  Renal insufficiency: new and requires workup     Amount and/or Complexity of Data Reviewed  Clinical lab tests: ordered and reviewed  Obtain history from someone other than the patient: yes  Discuss the patient with other providers: yes    Patient Progress  Patient progress: improved    CritCare Time    Disposition  Final diagnoses:   Hypokalemia   Prolonged QT interval   Renal insufficiency     Time reflects when diagnosis was documented in both MDM as applicable and the Disposition within this note     Time User Action Codes Description Comment    11/7/2017  8:12 PM Rohini Gunnels [E87 6] Hypokalemia     11/7/2017  8:12 PM Ever [place] Add [R94 31] Prolonged QT interval     11/7/2017  8:12 PM Ever [place] Add [N28 9] Renal insufficiency     11/7/2017  9:37 PM Macritchie, Pinky Just Modify [E87 6] Hypokalemia     11/7/2017  9:37 PM Macritchie, Pinky Just Add [N17 9,  N18 9] Acute kidney injury superimposed on chronic kidney disease ([facility] [address] )     11/7/2017  9:37 PM Macritchie, Pinky Just Modify [E87 6] Hypokalemia     11/7/2017  9:37 PM Macritchie, Pinky Just Modify [N17 9,  N18 9] Acute kidney injury superimposed on chronic kidney disease ([facility] [address] )     11/7/2017  9:37 PM Macritchie, Pinky Just Modify [E87 6] Hypokalemia     11/7/2017  9:37 PM Macritchie, Pinky Just Modify [E87 6] Hypokalemia     11/7/2017  9:37 PM Macritchie, Pinky Just Add [N18 3] Chronic kidney disease, stage 3     11/7/2017  9:37 PM Macritchie, Pinky Just Modify [E87 6] Hypokalemia     11/7/2017  9:37 PM Macritchie, Pinky Just Modify [N18 3] Chronic kidney disease, stage 3     11/7/2017  9:37 PM Macritchie, Pinky Just Modify [E87 6] Hypokalemia     11/7/2017  9:37 PM Macritchie, Pinky Just Modify [E87 6] Hypokalemia     11/7/2017  9:46 PM Macritchie, Pinky Just Modify [E87 6] Hypokalemia       ED Disposition     ED Disposition Condition Comment    Admit  Case was discussed with *NP covering Dr Luis Armando Neri** and the patient's admission status was agreed to be Admission Status: inpatient status to the service of Dr Noam Peña**           Follow-up Information     Follow up With Specialties Details Why [address] Jam Berkowitz MD Internal Medicine Schedule an appointment as soon as possible for a visit in 1 week(s)  MELISSA Miles [address]  [id number] [facility] [address] [address]  [address] [place] [address], [address] [place] [address] Nephrology Call in 3 day(s)  [address] [place] [address]  [phone]          Discharge Medication List as of 11/11/2017 11:49 AM CONTINUE these medications which have NOT CHANGED    Details   clomiPRAMINE (ANAFRANIL) 50 mg capsule Take by mouth, Starting Thu 7/7/2016, Historical Med      clonazePAM (KLONOPIN) 0 5 mg tablet Take by mouth 3 (three) times a day  , Starting Tue 3/7/2017, Historical Med      omeprazole (PriLOSEC) 40 MG capsule Take by mouth, Starting Mon 4/25/2016, Historical Med      QUEtiapine (SEROQUEL XR) 400 mg 24 hr tablet Take by mouth daily at bedtime  , Starting Sun 6/19/2016, Historical Med      Hydrocodone-Acetaminophen (LORCET 10/650 PO) Take by mouth 3 (three) times a day, Until Discontinued, Historical Med      lamoTRIgine (LaMICtal) 100 mg tablet Take by mouth 3 (three) times a day  , Historical Med      QUEtiapine (SEROQUEL) 300 mg tablet Take by mouth daily at bedtime  , Historical Med             Outpatient Discharge Orders  Basic metabolic panel   Standing Status: Future  Standing Exp  Date: 11/11/18     CBC and Platelet   Standing Status: Future  Standing Exp   Date: 11/11/18     Discharge Diet     Activity as tolerated         ED Provider  Electronically Signed by           Tiara Rea DO  11/13/17 1816

## 2017-11-08 PROBLEM — F55.2 DIARRHEA DUE TO LAXATIVE ABUSE: Status: ACTIVE | Noted: 2017-11-08

## 2017-11-08 LAB
ALBUMIN SERPL BCP-MCNC: 2.6 G/DL (ref 3.5–5)
ALP SERPL-CCNC: 164 U/L (ref 46–116)
ALT SERPL W P-5'-P-CCNC: 22 U/L (ref 12–78)
ANION GAP SERPL CALCULATED.3IONS-SCNC: 12 MMOL/L (ref 4–13)
ANION GAP SERPL CALCULATED.3IONS-SCNC: 9 MMOL/L (ref 4–13)
AST SERPL W P-5'-P-CCNC: 13 U/L (ref 5–45)
BILIRUB SERPL-MCNC: 0.2 MG/DL (ref 0.2–1)
BUN SERPL-MCNC: 17 MG/DL (ref 5–25)
BUN SERPL-MCNC: 21 MG/DL (ref 5–25)
CALCIUM SERPL-MCNC: 7.9 MG/DL (ref 8.3–10.1)
CALCIUM SERPL-MCNC: 8.2 MG/DL (ref 8.3–10.1)
CHLORIDE SERPL-SCNC: 112 MMOL/L (ref 100–108)
CHLORIDE SERPL-SCNC: 115 MMOL/L (ref 100–108)
CO2 SERPL-SCNC: 18 MMOL/L (ref 21–32)
CO2 SERPL-SCNC: 19 MMOL/L (ref 21–32)
CREAT SERPL-MCNC: 2.22 MG/DL (ref 0.6–1.3)
CREAT SERPL-MCNC: 2.42 MG/DL (ref 0.6–1.3)
ERYTHROCYTE [DISTWIDTH] IN BLOOD BY AUTOMATED COUNT: 13.9 % (ref 11.6–15.1)
GFR SERPL CREATININE-BSD FRML MDRD: 22 ML/MIN/1.73SQ M
GFR SERPL CREATININE-BSD FRML MDRD: 24 ML/MIN/1.73SQ M
GLUCOSE SERPL-MCNC: 96 MG/DL (ref 65–140)
GLUCOSE SERPL-MCNC: 97 MG/DL (ref 65–140)
HCT VFR BLD AUTO: 33.7 % (ref 34.8–46.1)
HGB BLD-MCNC: 10.7 G/DL (ref 11.5–15.4)
MCH RBC QN AUTO: 31.7 PG (ref 26.8–34.3)
MCHC RBC AUTO-ENTMCNC: 31.8 G/DL (ref 31.4–37.4)
MCV RBC AUTO: 100 FL (ref 82–98)
PHOSPHATE SERPL-MCNC: 5.1 MG/DL (ref 2.7–4.5)
PLATELET # BLD AUTO: 219 THOUSANDS/UL (ref 149–390)
PMV BLD AUTO: 11.4 FL (ref 8.9–12.7)
POTASSIUM SERPL-SCNC: 2.8 MMOL/L (ref 3.5–5.3)
POTASSIUM SERPL-SCNC: 3.5 MMOL/L (ref 3.5–5.3)
PROT SERPL-MCNC: 5.1 G/DL (ref 6.4–8.2)
RBC # BLD AUTO: 3.38 MILLION/UL (ref 3.81–5.12)
SODIUM SERPL-SCNC: 140 MMOL/L (ref 136–145)
SODIUM SERPL-SCNC: 145 MMOL/L (ref 136–145)
WBC # BLD AUTO: 7.02 THOUSAND/UL (ref 4.31–10.16)

## 2017-11-08 PROCEDURE — 80048 BASIC METABOLIC PNL TOTAL CA: CPT | Performed by: INTERNAL MEDICINE

## 2017-11-08 PROCEDURE — 80053 COMPREHEN METABOLIC PANEL: CPT | Performed by: NURSE PRACTITIONER

## 2017-11-08 PROCEDURE — 85027 COMPLETE CBC AUTOMATED: CPT | Performed by: NURSE PRACTITIONER

## 2017-11-08 PROCEDURE — 84100 ASSAY OF PHOSPHORUS: CPT | Performed by: NURSE PRACTITIONER

## 2017-11-08 RX ORDER — POTASSIUM CHLORIDE 20 MEQ/1
20 TABLET, EXTENDED RELEASE ORAL
Status: DISCONTINUED | OUTPATIENT
Start: 2017-11-08 | End: 2017-11-08

## 2017-11-08 RX ORDER — POTASSIUM CHLORIDE 14.9 MG/ML
20 INJECTION INTRAVENOUS ONCE
Status: COMPLETED | OUTPATIENT
Start: 2017-11-08 | End: 2017-11-08

## 2017-11-08 RX ORDER — POTASSIUM CHLORIDE 20 MEQ/1
40 TABLET, EXTENDED RELEASE ORAL
Status: DISCONTINUED | OUTPATIENT
Start: 2017-11-08 | End: 2017-11-09

## 2017-11-08 RX ADMIN — QUETIAPINE 400 MG: 400 TABLET, EXTENDED RELEASE ORAL at 22:04

## 2017-11-08 RX ADMIN — POTASSIUM CHLORIDE 20 MEQ: 200 INJECTION, SOLUTION INTRAVENOUS at 11:30

## 2017-11-08 RX ADMIN — HYDROCODONE BITARTRATE AND ACETAMINOPHEN 2 TABLET: 5; 325 TABLET ORAL at 22:03

## 2017-11-08 RX ADMIN — HYDROCODONE BITARTRATE AND ACETAMINOPHEN 2 TABLET: 5; 325 TABLET ORAL at 15:47

## 2017-11-08 RX ADMIN — POTASSIUM CHLORIDE 40 MEQ: 1500 TABLET, EXTENDED RELEASE ORAL at 15:47

## 2017-11-08 RX ADMIN — POTASSIUM CHLORIDE 40 MEQ: 1500 TABLET, EXTENDED RELEASE ORAL at 11:35

## 2017-11-08 RX ADMIN — POTASSIUM CHLORIDE 20 MEQ: 1500 TABLET, EXTENDED RELEASE ORAL at 08:09

## 2017-11-08 RX ADMIN — CLOMIPRAMINE HYDROCHLORIDE 50 MG: 25 CAPSULE ORAL at 15:47

## 2017-11-08 RX ADMIN — CLONAZEPAM 0.5 MG: 0.5 TABLET ORAL at 22:02

## 2017-11-08 RX ADMIN — SODIUM BICARBONATE 100 ML/HR: 84 INJECTION, SOLUTION INTRAVENOUS at 16:50

## 2017-11-08 RX ADMIN — PANTOPRAZOLE SODIUM 40 MG: 40 TABLET, DELAYED RELEASE ORAL at 05:10

## 2017-11-08 RX ADMIN — CLOMIPRAMINE HYDROCHLORIDE 50 MG: 25 CAPSULE ORAL at 08:08

## 2017-11-08 RX ADMIN — CLONAZEPAM 0.5 MG: 0.5 TABLET ORAL at 15:47

## 2017-11-08 RX ADMIN — LAMOTRIGINE 100 MG: 100 TABLET ORAL at 08:10

## 2017-11-08 RX ADMIN — LAMOTRIGINE 100 MG: 100 TABLET ORAL at 15:47

## 2017-11-08 RX ADMIN — CLOMIPRAMINE HYDROCHLORIDE 50 MG: 25 CAPSULE ORAL at 22:03

## 2017-11-08 RX ADMIN — QUETIAPINE FUMARATE 300 MG: 300 TABLET, FILM COATED ORAL at 22:02

## 2017-11-08 RX ADMIN — CLONAZEPAM 0.5 MG: 0.5 TABLET ORAL at 08:09

## 2017-11-08 RX ADMIN — LAMOTRIGINE 100 MG: 100 TABLET ORAL at 22:02

## 2017-11-08 RX ADMIN — HYDROCODONE BITARTRATE AND ACETAMINOPHEN 2 TABLET: 5; 325 TABLET ORAL at 08:09

## 2017-11-08 NOTE — SOCIAL WORK
Met with patient  Explained role of care management  Patient lives in a three story home with her 61year old sister, sisters children and grandchildren  Bedroom is on the third floor  She is independent adl's and ambulation, drives, is on SSI disability  DME - denies  Past services - Friends, Asya Christian, sees psychiatrist monthly, sees therapist every 2 weeks, sees PCP monthly  She plans on returning home at discharge and does not anticipate any discharge needs  Will follow

## 2017-11-08 NOTE — ED CARE HANDOFF
Emergency Department Sign Out Note        Sign out and transfer of care from Dr Charanjit Pimentel  See Separate Emergency Department note  The patient, Martin Cohn, was evaluated by the previous provider for  hypokalemia  Workup Completed:   potassium is 2 6 with a prolonged QT interval and renal insufficiency    ED Course / Workup Pending (followup):   will admit for replenishment and monitoring                          ED Course      Procedures  [facility]  CritCare Time      Disposition  Final diagnoses:   None     ED Disposition     None      Follow-up Information    None       Patient's Medications   Discharge Prescriptions    No medications on file     No discharge procedures on file         ED Provider  Electronically Signed by

## 2017-11-08 NOTE — CONSULTS
2           Consultation - Nephrology   Gene Roth 56 y o  female MRN: [id number]  Unit/Bed#: [address] [place] [address] [id number] Encounter: [id number]      Assessment/Plan     Assessment / Plan:  1  Renal   Patient has acute on chronic kidney disease all the labs that we have available in the records show abnormal kidney function at times even worse than it is now  The question is whether not she has just severely volume depleted leading to acute renal failure so we will monitor response to IV fluids  Monitor BMP their response to IV fluids    2  Hypokalemia and metabolic acidosis  The patient has history of cathartic abuse  Is having a lot of stools that were loose  This will lead to bicarbonate losses as well as potassium losses  Adjust IV fluids to containing some bicarbonate as this is a non gap acidosis  Replete potassium  Monitor stool  It appears with running bicarbonate fluids I can add potassium to it so patient is on large oral dose repletion and will follow  History of Present Illness   Physician Requesting Consult: Shelbi Gardner MD  Reason for Consult / Principal Problem:  Renal failure with electrolyte abnormalities  Hx and PE limited by:   HPI: Gene Roth is a 56y o  year old female who presents with history of laxative abuse who presents to the hospital complaining of weakness nausea vomiting and diarrhea  She also had labs done that revealed acute renal failure as well as hypokalemia should she was referred for admission  We were asked to see her regarding these issues  History obtained from chart review and the patient  Inpatient consult to Nephrology  Consult performed by: Nina Key ordered by: Douglas Haney          Review of Systems   Constitutional: Positive for fatigue  Negative for chills  HENT: Negative  Eyes: Negative  Respiratory: Positive for chest tightness  Negative for cough and shortness of breath      Cardiovascular: Negative for chest pain and leg swelling  Gastrointestinal: Positive for abdominal pain, diarrhea, nausea and vomiting  Negative for abdominal distention  Genitourinary: Negative  Musculoskeletal: Negative  Skin: Negative  Neurological: Negative for seizures and headaches         Historical Information   Patient Active Problem List   Diagnosis    Acute kidney injury superimposed on chronic kidney disease (HCC)    Hypokalemia    Bipolar 2 disorder (HCC)    Hypercholesteremia    Spondylolisthesis at L5-S1 level    Chronic kidney disease, stage 3    Prolonged Q-T interval on ECG    Diarrhea due to laxative abuse     Past Medical History:   Diagnosis Date    Ankle sprain     left    Bipolar 2 disorder (HCC)     Chronic back pain     Hypercholesteremia     Panic attacks      Past Surgical History:   Procedure Laterality Date    TUBAL LIGATION Bilateral 1997     Social History   History   Alcohol Use No     History   Drug Use No     History   Smoking Status    Never Smoker   Smokeless Tobacco    Never Used     Family History   Problem Relation Age of Onset    Bipolar disorder Mother     Heart disease Father     Hypertension Father        Meds/Allergies   current meds:   Current Facility-Administered Medications   Medication Dose Route Frequency    clomiPRAMINE (ANAFRANIL) capsule 50 mg  50 mg Oral TID    clonazePAM (KlonoPIN) tablet 0 5 mg  0 5 mg Oral TID    HYDROcodone-acetaminophen (NORCO) 5-325 mg per tablet 2 tablet  2 tablet Oral TID    lamoTRIgine (LaMICtal) tablet 100 mg  100 mg Oral TID    ondansetron (ZOFRAN) injection 4 mg  4 mg Intravenous Q6H PRN    pantoprazole (PROTONIX) EC tablet 40 mg  40 mg Oral Early Morning    potassium chloride (K-DUR,KLOR-CON) CR tablet 40 mEq  40 mEq Oral TID With Meals    QUEtiapine (SEROquel XR) 24 hr tablet 400 mg  400 mg Oral HS    QUEtiapine (SEROquel) tablet 300 mg  300 mg Oral HS    sodium chloride 0 9 % with KCl 20 mEq/L infusion (premix)  100 mL/hr Intravenous Continuous     No Known Allergies    Objective   No intake or output data in the 24 hours ending 11/08/17 1428    Invasive Devices:        PHYSICAL EXAM:  BP 95/51   Pulse 85   Temp 98 2 °F (36 8 °C) (Oral)   Resp 17   Ht 5' 7" (1 702 m)   Wt 60 5 kg (133 lb 6 1 oz)   LMP  (LMP Unknown)   SpO2 98%   Breastfeeding? No   BMI 20 89 kg/m²     Physical Exam   Constitutional: No distress  HENT:   Mouth/Throat: No oropharyngeal exudate  Eyes: No scleral icterus  Neck: Neck supple  No JVD present  Cardiovascular: Normal rate  Exam reveals no friction rub  Pulmonary/Chest: Effort normal and breath sounds normal  No respiratory distress  She has no wheezes  She has no rales  Abdominal: Soft  Bowel sounds are normal  She exhibits no distension  There is no tenderness  Musculoskeletal: She exhibits no edema           Current Weight: Weight - Scale: 60 5 kg (133 lb 6 1 oz)  First Weight: Weight - Scale: 55 3 kg (122 lb)    Lab Results:      Results from last 7 days  Lab Units 11/08/17  0511   WBC Thousand/uL 7 02   HEMOGLOBIN g/dL 10 7*   HEMATOCRIT % 33 7*   PLATELETS Thousands/uL 219       Results from last 7 days  Lab Units 11/08/17  0511   SODIUM mmol/L 145   POTASSIUM mmol/L 2 8*   CHLORIDE mmol/L 115*   CO2 mmol/L 18*   BUN mg/dL 21   CREATININE mg/dL 2 42*   GLUCOSE RANDOM mg/dL 97   CALCIUM mg/dL 8 2*       Results from last 7 days  Lab Units 11/08/17  0511   SODIUM mmol/L 145   POTASSIUM mmol/L 2 8*   CHLORIDE mmol/L 115*   CO2 mmol/L 18*   BUN mg/dL 21   CREATININE mg/dL 2 42*   CALCIUM mg/dL 8 2*   TOTAL PROTEIN g/dL 5 1*   BILIRUBIN TOTAL mg/dL 0 20   ALK PHOS U/L 164*   ALT U/L 22   AST U/L 13   GLUCOSE RANDOM mg/dL 97

## 2017-11-08 NOTE — H&P
History and Physical - [facility] Internal Medicine    Patient Information: Monico Castillo 56 y o  female MRN: [id number]  Unit/Bed#: [address] [place] [address] Encounter: [id number]  Admitting Physician: Jeanne Rao  PCP: Laina Silva  Date of Admission:  11/08/17    Assessment/Plan:    Hospital Problem List:     Principal Problem:    Acute kidney injury superimposed on chronic kidney disease ([facility] )  Active Problems:    Hypokalemia    Bipolar 2 disorder ([facility] )    Chronic kidney disease, stage 3    Prolonged Q-T interval on ECG    Diarrhea due to laxative abuse      Plan for the Primary Problem(s):  · Acute kidney injury superimposed on CKD stage 3:  Most likely pre renal as patient has frequent diarrhea  · Inpatient consult Nephrology  · IV fluids for hydration  · Repeat CMP in a m   · Hold all nephrotoxins medications    Plan for Additional Problems:   · Hypokalemia:  Potassium 2 6 in the ED  Patient repleted with 40 mEq IV KCl  Will recheck CMP in a m    Will restart KCL 20 mEq t i d  Tomorrow  · Prolonged QT interval:  Continue to monitor on telemetry  EKG with any change in rhythm  · Chronic diarrhea due to laxative abuse:  Patient takes laxatives for weight loss  Hold all laxatives  Patient sees psychiatrist and therapist as outpatient  Refusing inpatient psych consult  · Bipolar disorder:  Continue clomipramine, Klonopin, Lamictal, and Seroquel  VTE Prophylaxis: Low risk for VT  / sequential compression device   Code Status:  Full code  POLST: There is no POLST form on file for this patient (pre-hospital)    Anticipated Length of Stay:  Patient will be admitted on an Inpatient basis with an anticipated length of stay of  > 2 midnights  Justification for Hospital Stay:  Close cardiac monitoring and IV hydration    Total Time for Visit, including Counseling / Coordination of Care: 30 minutes    Greater than 50% of this total time spent on direct patient counseling and coordination of care     Chief Complaint:   Lightheaded    History of Present Illness:    Renee Diaz is a 56 y o  female with history of laxative abuse, CKD stage 3, and bipolar disorder who presents with complaints of lightheadedness and dizziness, nausea, vomiting, and diarrhea  Patient was seen by PCP for annual checkup yesterday  She received call from PCP office today instructing her to go to ED for abnormal lab values  In the ED her potassium was 2 6 and her creatinine was 2 76  Quiroga Spruce Patient was hospitalized in January of this year for similar symptoms  Patient states that she takes approximately 30 laxatives per day to lose weight  She is currently seeing a psychiatrist and therapist for this issue  Review of Systems:    Review of Systems   Constitutional: Negative for appetite change, chills and fever  Respiratory: Negative for apnea, cough and shortness of breath  Cardiovascular: Negative for chest pain, palpitations and leg swelling  Gastrointestinal: Positive for diarrhea, nausea and vomiting  Negative for abdominal distention, abdominal pain and constipation  Genitourinary: Negative for dysuria  Neurological: Positive for dizziness and light-headedness  Negative for seizures, facial asymmetry, weakness, numbness and headaches  Psychiatric/Behavioral: Negative for agitation and confusion  The patient is not nervous/anxious  All other systems reviewed and are negative  Past Medical and Surgical History:     Past Medical History:   Diagnosis Date    Ankle sprain     left    Bipolar 2 disorder (HCC)     Chronic back pain     Hypercholesteremia     Panic attacks        Past Surgical History:   Procedure Laterality Date    TUBAL LIGATION Bilateral 1997       Meds/Allergies:    Prior to Admission medications    Medication Sig Start Date End Date Taking?  Authorizing Provider   clomiPRAMINE (ANAFRANIL) 50 mg capsule Take by mouth 7/7/16  Yes Historical Provider, MD perezazePAM Srinivas Leblanc) 0 5 mg tablet Take by mouth 3 (three) times a day   3/7/17  Yes Historical Provider, MD   omeprazole (PriLOSEC) 40 MG capsule Take by mouth 4/25/16  Yes Historical Provider, MD   QUEtiapine (SEROQUEL XR) 400 mg 24 hr tablet Take by mouth daily at bedtime   6/19/16  Yes Historical Provider, MD   Hydrocodone-Acetaminophen (LORCET 10/650 PO) Take by mouth 3 (three) times a day    Historical Provider, MD   lamoTRIgine (LaMICtal) 100 mg tablet Take by mouth 3 (three) times a day      Historical Provider, MD   QUEtiapine (SEROQUEL) 300 mg tablet Take by mouth daily at bedtime      Historical Provider, MD   atorvastatin (LIPITOR) 40 mg tablet Take 40 mg by mouth daily  11/7/17  Historical Provider, MD   clomiPRAMINE (ANAFRANIL) 75 MG capsule Take 150 mg by mouth daily at bedtime  11/7/17  Historical Provider, MD   clonazePAM (KlonoPIN) 0 5 mg tablet Take 0 5 mg by mouth 4 (four) times a day as needed for seizures  11/7/17  Historical Provider, MD   lamoTRIgine (LaMICtal) 200 MG tablet Take 200 mg by mouth daily  11/7/17  Historical Provider, MD   QUEtiapine (SEROquel) 300 mg tablet Take 700 mg by mouth daily at bedtime  11/7/17  Historical Provider, MD     I have reviewed home medications with patient personally      Allergies: No Known Allergies    Social History:     Marital Status:    Occupation:  Unemployed  Patient Pre-hospital Living Situation:  Lives with sister  Patient Pre-hospital Level of Mobility:  Independent  Patient Pre-hospital Diet Restrictions:  None  Substance Use History:   History   Alcohol Use No     History   Smoking Status    Never Smoker   Smokeless Tobacco    Never Used     History   Drug Use No       Family History:    non-contributory    Physical Exam:     Vitals:   Blood Pressure: 111/56 (11/07/17 2316)  Pulse: 86 (11/07/17 2316)  Temperature: 98 °F (36 7 °C) (11/07/17 2316)  Temp Source: Oral (11/07/17 2316)  Respirations: 16 (11/07/17 2316)  Height: 5' 7" (170 2 cm) (11/07/17 2127)  Weight - Scale: 57 7 kg (127 lb 3 3 oz) (11/07/17 2127)  SpO2: 98 % (11/07/17 2316)    Physical Exam   Constitutional: She is oriented to person, place, and time  She appears well-developed and well-nourished  No distress  HENT:   Head: Normocephalic and atraumatic  Eyes: EOM are normal  Pupils are equal, round, and reactive to light  Neck: Normal range of motion  Neck supple  Cardiovascular: Normal rate, regular rhythm, normal heart sounds and intact distal pulses  Exam reveals no gallop and no friction rub  No murmur heard  Pulmonary/Chest: Effort normal and breath sounds normal  No respiratory distress  She has no wheezes  She has no rales  Abdominal: Soft  Bowel sounds are normal  She exhibits no distension  There is no tenderness  Musculoskeletal: Normal range of motion  She exhibits no edema  Neurological: She is alert and oriented to person, place, and time  Skin: Skin is warm and dry  Psychiatric: She has a normal mood and affect  Judgment normal    Nursing note and vitals reviewed  Additional Data:     Lab Results: I have personally reviewed pertinent reports  Results from last 7 days  Lab Units 11/07/17 1912   WBC Thousand/uL 7 68   HEMOGLOBIN g/dL 11 1*   HEMATOCRIT % 34 2*   PLATELETS Thousands/uL 233   NEUTROS PCT % 67   LYMPHS PCT % 22   MONOS PCT % 9   EOS PCT % 1       Results from last 7 days  Lab Units 11/07/17 1912   SODIUM mmol/L 137   POTASSIUM mmol/L 2 6*   CHLORIDE mmol/L 106   CO2 mmol/L 20*   BUN mg/dL 21   CREATININE mg/dL 2 76*   CALCIUM mg/dL 8 3   TOTAL PROTEIN g/dL 6 0*   BILIRUBIN TOTAL mg/dL 0 30   ALK PHOS U/L 188*   ALT U/L 28   AST U/L 19   GLUCOSE RANDOM mg/dL 93           EKG, Pathology, and Other Studies Reviewed on Admission:   · EKG: SR with prolonged QT interval    Allscripts Records Reviewed: No     ** Please Note: Dragon 360 Dictation voice to text software may have been used in the creation of this document   **

## 2017-11-08 NOTE — PROGRESS NOTES
Progress Note - Jeyson Quiroga 56 y o  female MRN: [id number]    Unit/Bed#: [address] [place] [address] [id number] Encounter: [id number]      Assessment:  Principal Problem:    Acute kidney injury superimposed on chronic kidney disease ([address] )  Active Problems:    Hypokalemia    Prolonged Q-T interval on ECG    Bipolar 2 disorder (HCC)    Chronic kidney disease, stage 3    Diarrhea due to laxative abuse  Resolved Problems:    * No resolved hospital problems  *        Plan:  · Hypokalemia with prolonged QT interval-still with low readings and will give IV bolus and increase oral supplements-BMP recheck this afternoon  · Acute kidney injury with chronic kidney disease stage 3-creatinine 2 42 slight improvement from admission-continue IV fluid  · Chronic laxatives abuse with persistent diarrhea  · Bipolar disorder    Subjective:   Patient reports feeling tired and woozy when she goes to get up  Having some intermittent leg cramps  No nausea or vomiting but only ate a piece of toast this morning for breakfast   Discussed trying to gradually increase intake and do multiple small meals per day  ROS  Comprehensive system review negative other than noted above    Objective:     Vitals: Blood pressure 95/51, pulse 85, temperature 98 2 °F (36 8 °C), temperature source Oral, resp  rate 17, height 5' 7" (1 702 m), weight 60 5 kg (133 lb 6 1 oz), SpO2 98 %, not currently breastfeeding  ,Body mass index is 20 89 kg/m²    Current Facility-Administered Medications   Medication Dose Route Frequency Provider Last Rate Last Dose    clomiPRAMINE (ANAFRANIL) capsule 50 mg  50 mg Oral TID Mauri Like, CRNP   50 mg at 11/08/17 7649    clonazePAM (KlonoPIN) tablet 0 5 mg  0 5 mg Oral TID Mauri Like, CRNP   0 5 mg at 11/08/17 0809    HYDROcodone-acetaminophen (NORCO) 5-325 mg per tablet 2 tablet  2 tablet Oral TID Mauri Like, CRNP   2 tablet at 11/08/17 0809    lamoTRIgine (LaMICtal) tablet 100 mg  100 mg Oral TID Mauri Like, CRNP 100 mg at 11/08/17 0810    ondansetron (ZOFRAN) injection 4 mg  4 mg Intravenous Q6H PRN Rena Avilaer, EMERITA        pantoprazole (PROTONIX) EC tablet 40 mg  40 mg Oral Early Morning Gabriela Macrigeovanyie, CRNP   40 mg at 11/08/17 0510    potassium chloride (K-DUR,KLOR-CON) CR tablet 20 mEq  20 mEq Oral TID With Meals Rena Govea, CRNP   20 mEq at 11/08/17 0809    QUEtiapine (SEROquel XR) 24 hr tablet 400 mg  400 mg Oral HS Gabriela Macritchie, CRNP   400 mg at 11/07/17 2237    QUEtiapine (SEROquel) tablet 300 mg  300 mg Oral HS Gabriela Macritchie, CRNP   300 mg at 11/07/17 2236    sodium chloride 0 9 % with KCl 20 mEq/L infusion (premix)  100 mL/hr Intravenous Continuous Gabriela Macritchie, CRNP 100 mL/hr at 11/07/17 2239 100 mL/hr at 11/07/17 2239     Prescriptions Prior to Admission   Medication    clomiPRAMINE (ANAFRANIL) 50 mg capsule    clonazePAM (KLONOPIN) 0 5 mg tablet    omeprazole (PriLOSEC) 40 MG capsule    QUEtiapine (SEROQUEL XR) 400 mg 24 hr tablet    Hydrocodone-Acetaminophen (LORCET 10/650 PO)    lamoTRIgine (LaMICtal) 100 mg tablet    QUEtiapine (SEROQUEL) 300 mg tablet       No intake or output data in the 24 hours ending 11/08/17 1040    Physical Exam:  General appearance: alert, appears stated age, cooperative, fatigued and no distress  Neck: no adenopathy, no JVD and thyroid not enlarged, symmetric, no tenderness/mass/nodules  Lungs: clear to auscultation bilaterally  Heart: regular rate and rhythm, S1, S2 normal, no murmur, click, rub or gallop  Abdomen: soft, non-tender; bowel sounds normal; no masses,  no organomegaly  Extremities: extremities normal, atraumatic, no cyanosis or edema  Skin: Skin color, texture, turgor normal  No rashes or lesions  Neurologic: Grossly normal       Lab, Imaging and other studies: I have personally reviewed pertinent reports              Results from last 7 days  Lab Units 11/08/17  0511 11/07/17  1912   WBC Thousand/uL 7 02 7 68   HEMOGLOBIN g/dL 10 7* 11 1*   HEMATOCRIT % 33 7* 34 2*   PLATELETS Thousands/uL 219 233   NEUTROS PCT %  --  67   LYMPHS PCT %  --  22   MONOS PCT %  --  9   EOS PCT %  --  1       Results from last 7 days  Lab Units 11/08/17  0511 11/07/17  1912   SODIUM mmol/L 145 137   POTASSIUM mmol/L 2 8* 2 6*   CHLORIDE mmol/L 115* 106   CO2 mmol/L 18* 20*   BUN mg/dL 21 21   CREATININE mg/dL 2 42* 2 76*   CALCIUM mg/dL 8 2* 8 3   TOTAL PROTEIN g/dL 5 1* 6 0*   BILIRUBIN TOTAL mg/dL 0 20 0 30   ALK PHOS U/L 164* 188*   ALT U/L 22 28   AST U/L 13 19   GLUCOSE RANDOM mg/dL 97 93     No results found for: TROPONINI, CKTOTAL      Lab Results   Component Value Date    URINECX >100,000 cfu/ml Escherichia coli 01/18/2017    URINECX 60,000-69,000 cfu/ml Klebsiella oxytoca 01/18/2017       Imaging:  No results found for this or any previous visit  No results found for this or any previous visit  PATIENT CENTERED ROUNDS: I have performed rounds with the nursing staff            Shannon Butts DO

## 2017-11-09 LAB
ALBUMIN SERPL BCP-MCNC: 2.2 G/DL (ref 3.5–5)
ALP SERPL-CCNC: 139 U/L (ref 46–116)
ALT SERPL W P-5'-P-CCNC: 17 U/L (ref 12–78)
ANION GAP SERPL CALCULATED.3IONS-SCNC: 5 MMOL/L (ref 4–13)
AST SERPL W P-5'-P-CCNC: 12 U/L (ref 5–45)
BILIRUB SERPL-MCNC: 0.1 MG/DL (ref 0.2–1)
BUN SERPL-MCNC: 20 MG/DL (ref 5–25)
CALCIUM SERPL-MCNC: 7.9 MG/DL (ref 8.3–10.1)
CHLORIDE SERPL-SCNC: 120 MMOL/L (ref 100–108)
CO2 SERPL-SCNC: 21 MMOL/L (ref 21–32)
CREAT SERPL-MCNC: 1.96 MG/DL (ref 0.6–1.3)
ERYTHROCYTE [DISTWIDTH] IN BLOOD BY AUTOMATED COUNT: 14.1 % (ref 11.6–15.1)
GFR SERPL CREATININE-BSD FRML MDRD: 28 ML/MIN/1.73SQ M
GLUCOSE SERPL-MCNC: 96 MG/DL (ref 65–140)
HCT VFR BLD AUTO: 29.9 % (ref 34.8–46.1)
HGB BLD-MCNC: 9 G/DL (ref 11.5–15.4)
MCH RBC QN AUTO: 30.3 PG (ref 26.8–34.3)
MCHC RBC AUTO-ENTMCNC: 30.1 G/DL (ref 31.4–37.4)
MCV RBC AUTO: 101 FL (ref 82–98)
PLATELET # BLD AUTO: 208 THOUSANDS/UL (ref 149–390)
PMV BLD AUTO: 10.5 FL (ref 8.9–12.7)
POTASSIUM SERPL-SCNC: 3.8 MMOL/L (ref 3.5–5.3)
PROT SERPL-MCNC: 4.6 G/DL (ref 6.4–8.2)
RBC # BLD AUTO: 2.97 MILLION/UL (ref 3.81–5.12)
SODIUM SERPL-SCNC: 146 MMOL/L (ref 136–145)
WBC # BLD AUTO: 3.59 THOUSAND/UL (ref 4.31–10.16)

## 2017-11-09 PROCEDURE — 80053 COMPREHEN METABOLIC PANEL: CPT | Performed by: INTERNAL MEDICINE

## 2017-11-09 PROCEDURE — 85027 COMPLETE CBC AUTOMATED: CPT | Performed by: INTERNAL MEDICINE

## 2017-11-09 RX ORDER — SODIUM CHLORIDE 9 MG/ML
75 INJECTION, SOLUTION INTRAVENOUS CONTINUOUS
Status: DISCONTINUED | OUTPATIENT
Start: 2017-11-09 | End: 2017-11-10

## 2017-11-09 RX ORDER — POTASSIUM CHLORIDE 20 MEQ/1
40 TABLET, EXTENDED RELEASE ORAL 2 TIMES DAILY
Status: DISCONTINUED | OUTPATIENT
Start: 2017-11-09 | End: 2017-11-11

## 2017-11-09 RX ADMIN — CLOMIPRAMINE HYDROCHLORIDE 50 MG: 25 CAPSULE ORAL at 16:25

## 2017-11-09 RX ADMIN — POTASSIUM CHLORIDE 40 MEQ: 1500 TABLET, EXTENDED RELEASE ORAL at 17:30

## 2017-11-09 RX ADMIN — PANTOPRAZOLE SODIUM 40 MG: 40 TABLET, DELAYED RELEASE ORAL at 05:48

## 2017-11-09 RX ADMIN — LAMOTRIGINE 100 MG: 100 TABLET ORAL at 16:23

## 2017-11-09 RX ADMIN — CLONAZEPAM 0.5 MG: 0.5 TABLET ORAL at 16:23

## 2017-11-09 RX ADMIN — HYDROCODONE BITARTRATE AND ACETAMINOPHEN 2 TABLET: 5; 325 TABLET ORAL at 08:26

## 2017-11-09 RX ADMIN — SODIUM CHLORIDE 75 ML/HR: 0.9 INJECTION, SOLUTION INTRAVENOUS at 13:43

## 2017-11-09 RX ADMIN — HYDROCODONE BITARTRATE AND ACETAMINOPHEN 2 TABLET: 5; 325 TABLET ORAL at 21:31

## 2017-11-09 RX ADMIN — LAMOTRIGINE 100 MG: 100 TABLET ORAL at 21:31

## 2017-11-09 RX ADMIN — CLONAZEPAM 0.5 MG: 0.5 TABLET ORAL at 08:26

## 2017-11-09 RX ADMIN — QUETIAPINE FUMARATE 300 MG: 300 TABLET, FILM COATED ORAL at 21:31

## 2017-11-09 RX ADMIN — CLONAZEPAM 0.5 MG: 0.5 TABLET ORAL at 21:31

## 2017-11-09 RX ADMIN — HYDROCODONE BITARTRATE AND ACETAMINOPHEN 2 TABLET: 5; 325 TABLET ORAL at 16:23

## 2017-11-09 RX ADMIN — CLOMIPRAMINE HYDROCHLORIDE 50 MG: 25 CAPSULE ORAL at 21:31

## 2017-11-09 RX ADMIN — LAMOTRIGINE 100 MG: 100 TABLET ORAL at 08:26

## 2017-11-09 RX ADMIN — CLOMIPRAMINE HYDROCHLORIDE 50 MG: 25 CAPSULE ORAL at 08:26

## 2017-11-09 RX ADMIN — SODIUM CHLORIDE 75 ML/HR: 0.9 INJECTION, SOLUTION INTRAVENOUS at 13:39

## 2017-11-09 RX ADMIN — POTASSIUM CHLORIDE 40 MEQ: 1500 TABLET, EXTENDED RELEASE ORAL at 08:26

## 2017-11-09 RX ADMIN — QUETIAPINE 400 MG: 400 TABLET, EXTENDED RELEASE ORAL at 21:32

## 2017-11-09 NOTE — PROGRESS NOTES
NEPHROLOGY PROGRESS NOTE    Ana María Ellington 56 y o  female MRN: [id number]  Unit/Bed#: [address] [place] [address] Encounter: [id number]  Reason for Consult:  Metabolic acidosis and hypokalemia    ASSESSMENT/PLAN:  1  Metabolic acidosis and hypokalemia  The patient abuses laxatives and has chronic diarrhea from this resulting in stool bicarb losses as well as potassium losses  With IV fluids and bicarbonate this has corrected the potassium is being repleted as well  Patient is under psychiatric treatment to try and control this compulsive behavior  Changed to normal saline at 75 cc/hour  Receive potassium by primary team    2  Renal   Patient's creatinine is slightly lower with IV fluids so suspect there is an element of prerenal azotemia  Will monitor with IV fluids he were creatinine settles out mass likely a patient's baseline  SUBJECTIVE:  Review of Systems   Constitution: Positive for weakness  Negative for chills and fever  HENT: Negative  Eyes: Negative  Cardiovascular: Negative for chest pain, dyspnea on exertion, leg swelling and orthopnea  Respiratory: Negative for cough and shortness of breath  Gastrointestinal: Negative for bloating, abdominal pain, nausea and vomiting  One loose stool   Genitourinary: Negative  Neurological: Positive for dizziness  OBJECTIVE:  Current Weight: Weight - Scale: 60 5 kg (133 lb 6 1 oz)  Vitals:Temp (24hrs), Av °F (36 7 °C), Min:97 6 °F (36 4 °C), Max:98 8 °F (37 1 °C)  Current: Temperature: 97 6 °F (36 4 °C)   Blood pressure 107/61, pulse 86, temperature 97 6 °F (36 4 °C), temperature source Tympanic, resp  rate 17, height 5' 7" (1 702 m), weight 60 5 kg (133 lb 6 1 oz), SpO2 97 %, not currently breastfeeding  ,Body mass index is 20 89 kg/m²      No intake or output data in the 24 hours ending 17 1254    Physical Exam: /61   Pulse 86   Temp 97 6 °F (36 4 °C) (Tympanic)   Resp 17   Ht 5' 7" (1 702 m)   Wt 60 5 kg (133 lb 6 1 oz)   LMP  (LMP Unknown)   SpO2 97%   Breastfeeding? No   BMI 20 89 kg/m²   Physical Exam   Constitutional: No distress  HENT:   Mouth/Throat: No oropharyngeal exudate  Eyes: No scleral icterus  Neck: Neck supple  No JVD present  Cardiovascular: Normal rate  Exam reveals no friction rub  Pulmonary/Chest: Effort normal and breath sounds normal  No respiratory distress  She has no wheezes  She has no rales  Abdominal: Soft  Bowel sounds are normal  She exhibits no distension  There is no tenderness  There is no rebound  Musculoskeletal: She exhibits no edema         Medications:    Current Facility-Administered Medications:     clomiPRAMINE (ANAFRANIL) capsule 50 mg, 50 mg, Oral, TID, Gabriela Macritchie, CRNP, 50 mg at 11/09/17 1105    clonazePAM (KlonoPIN) tablet 0 5 mg, 0 5 mg, Oral, TID, Timmy Felt, CRNP, 0 5 mg at 11/09/17 5837    HYDROcodone-acetaminophen (NORCO) 5-325 mg per tablet 2 tablet, 2 tablet, Oral, TID, Timmy Felt, CRNP, 2 tablet at 11/09/17 0532    lamoTRIgine (LaMICtal) tablet 100 mg, 100 mg, Oral, TID, Timmy Felt, CRNP, 100 mg at 11/09/17 0826    ondansetron (ZOFRAN) injection 4 mg, 4 mg, Intravenous, Q6H PRN, Timmy Felt, CRNP    pantoprazole (PROTONIX) EC tablet 40 mg, 40 mg, Oral, Early Morning, Gabriela Macritchie, CRNP, 40 mg at 11/09/17 0548    potassium chloride (K-DUR,KLOR-CON) CR tablet 40 mEq, 40 mEq, Oral, BID, Hans Tejada MD    QUEtiapine (SEROquel XR) 24 hr tablet 400 mg, 400 mg, Oral, HS, Gabriela Macritchie, CRNP, 400 mg at 11/08/17 2204    QUEtiapine (SEROquel) tablet 300 mg, 300 mg, Oral, HS, Gabriela Macritchie, CRNP, 300 mg at 11/08/17 2202    sodium chloride 0 9 % infusion, 75 mL/hr, Intravenous, Continuous, Ryan Dickson MD    Laboratory Results:  Lab Results   Component Value Date    WBC 3 59 (L) 11/09/2017    HGB 9 0 (L) 11/09/2017    HCT 29 9 (L) 11/09/2017     (H) 11/09/2017     11/09/2017     Lab Results   Component Value Date    GLUCOSE 96 11/09/2017    CALCIUM 7 9 (L) 11/09/2017     (H) 11/09/2017    K 3 8 11/09/2017    CO2 21 11/09/2017     (H) 11/09/2017    BUN 20 11/09/2017    CREATININE 1 96 (H) 11/09/2017     Lab Results   Component Value Date    CALCIUM 7 9 (L) 11/09/2017    PHOS 5 1 (H) 11/08/2017     No results found for: LABPROT

## 2017-11-09 NOTE — PROGRESS NOTES
Progress Note - Jeyson Quiroga 56 y o  female MRN: [id number]    Unit/Bed#: [address] [place] [address] Encounter: [id number]      Assessment:  Patient Active Problem List    Diagnosis Date Noted    Diarrhea due to laxative abuse 11/08/2017    Chronic kidney disease, stage 3 11/07/2017    Prolonged Q-T interval on ECG 11/07/2017    Spondylolisthesis at L5-S1 level 01/20/2017    Acute kidney injury superimposed on chronic kidney disease ([facility] ) 01/17/2017    Hypokalemia 01/17/2017    Bipolar 2 disorder ([facility] ) 01/17/2017    Hypercholesteremia 01/17/2017       Plan: Will decrease IV fluid rate to 75 cc an hour  Will decrease potassium chloride replacement to 40 mEq p o  B i d  The patient is non anion gap metabolic acidosis due to diarrhea is improving on bicarbonate containing IV fluids  Will monitor electrolytes and CO2 with serial blood work will likely discharge her tomorrow  Subjective:   Patient feels dizzy and lightheaded when walking but overall her condition is improving slowly  Denies nausea vomiting  She had 1 loose bowel movement last night  Denies signs of bleeding  Her renal function improved today her creatinine is 1 96, her potassium is 3 8 today  All other ROS are negative  Objective:     Vitals: Blood pressure 107/61, pulse 86, temperature 97 6 °F (36 4 °C), temperature source Tympanic, resp  rate 17, height 5' 7" (1 702 m), weight 60 5 kg (133 lb 6 1 oz), SpO2 97 %, not currently breastfeeding  ,Body mass index is 20 89 kg/m²      No intake or output data in the 24 hours ending 11/09/17 1039    Physical Exam:    Alert and awake in no acute distress on room air  Head normocephalic, PERRLA   Oral membranes are moist,  Neck supple, no lymphadenopathy  Lungs clear to auscultation bilaterally  Heart regular rate and rythm, normal heart sounds  Abdomen soft, active bowel sounds, there is minimal periumbilical tenderness without rebound or guarding  Extremities: there is no joint effusion or warmth  Skin: warm, no hives seen, no cellulitis seen there is no lower extremity edema  Neuro: no facial droop, tongue is midline, strength 5/5 bilateral and equal, speech is normal        Lab, Imaging and other studies: I have personally reviewed pertinent reports   See below         Current Facility-Administered Medications   Medication Dose Route Frequency    clomiPRAMINE (ANAFRANIL) capsule 50 mg  50 mg Oral TID    clonazePAM (KlonoPIN) tablet 0 5 mg  0 5 mg Oral TID    HYDROcodone-acetaminophen (NORCO) 5-325 mg per tablet 2 tablet  2 tablet Oral TID    lamoTRIgine (LaMICtal) tablet 100 mg  100 mg Oral TID    ondansetron (ZOFRAN) injection 4 mg  4 mg Intravenous Q6H PRN    pantoprazole (PROTONIX) EC tablet 40 mg  40 mg Oral Early Morning    potassium chloride (K-DUR,KLOR-CON) CR tablet 40 mEq  40 mEq Oral BID    QUEtiapine (SEROquel XR) 24 hr tablet 400 mg  400 mg Oral HS    QUEtiapine (SEROquel) tablet 300 mg  300 mg Oral HS    sodium bicarbonate 75 mEq in sodium chloride 0 45 % 1,000 mL infusion  75 mL/hr Intravenous Continuous       Admission on 11/07/2017   Component Date Value    WBC 11/07/2017 7 68     RBC 11/07/2017 3 49*    Hemoglobin 11/07/2017 11 1*    Hematocrit 11/07/2017 34 2*    MCV 11/07/2017 98     MCH 11/07/2017 31 8     MCHC 11/07/2017 32 5     RDW 11/07/2017 13 8     MPV 11/07/2017 11 0     Platelets 88/48/3112 233     Neutrophils Relative 11/07/2017 67     Lymphocytes Relative 11/07/2017 22     Monocytes Relative 11/07/2017 9     Eosinophils Relative 11/07/2017 1     Basophils Relative 11/07/2017 1     Neutrophils Absolute 11/07/2017 5 24     Lymphocytes Absolute 11/07/2017 1 66     Monocytes Absolute 11/07/2017 0 65     Eosinophils Absolute 11/07/2017 0 09     Basophils Absolute 11/07/2017 0 04     Sodium 11/07/2017 137     Potassium 11/07/2017 2 6*    Chloride 11/07/2017 106     CO2 11/07/2017 20*    Anion Gap 11/07/2017 11     BUN 11/07/2017 21     Creatinine 11/07/2017 2 76*    Glucose 11/07/2017 93     Calcium 11/07/2017 8 3     AST 11/07/2017 19     ALT 11/07/2017 28     Alkaline Phosphatase 11/07/2017 188*    Total Protein 11/07/2017 6 0*    Albumin 11/07/2017 3 2*    Total Bilirubin 11/07/2017 0 30     eGFR 11/07/2017 19     Magnesium 11/07/2017 2 1     Phosphorus 11/07/2017 6 4*    Sodium 11/08/2017 145     Potassium 11/08/2017 2 8*    Chloride 11/08/2017 115*    CO2 11/08/2017 18*    Anion Gap 11/08/2017 12     BUN 11/08/2017 21     Creatinine 11/08/2017 2 42*    Glucose 11/08/2017 97     Calcium 11/08/2017 8 2*    AST 11/08/2017 13     ALT 11/08/2017 22     Alkaline Phosphatase 11/08/2017 164*    Total Protein 11/08/2017 5 1*    Albumin 11/08/2017 2 6*    Total Bilirubin 11/08/2017 0 20     eGFR 11/08/2017 22     Phosphorus 11/08/2017 5 1*    WBC 11/08/2017 7 02     RBC 11/08/2017 3 38*    Hemoglobin 11/08/2017 10 7*    Hematocrit 11/08/2017 33 7*    MCV 11/08/2017 100*    MCH 11/08/2017 31 7     MCHC 11/08/2017 31 8     RDW 11/08/2017 13 9     Platelets 21/01/8539 219     MPV 11/08/2017 11 4     Sodium 11/08/2017 140     Potassium 11/08/2017 3 5     Chloride 11/08/2017 112*    CO2 11/08/2017 19*    Anion Gap 11/08/2017 9     BUN 11/08/2017 17     Creatinine 11/08/2017 2 22*    Glucose 11/08/2017 96     Calcium 11/08/2017 7 9*    eGFR 11/08/2017 24     Sodium 11/09/2017 146*    Potassium 11/09/2017 3 8     Chloride 11/09/2017 120*    CO2 11/09/2017 21     Anion Gap 11/09/2017 5     BUN 11/09/2017 20     Creatinine 11/09/2017 1 96*    Glucose 11/09/2017 96     Calcium 11/09/2017 7 9*    AST 11/09/2017 12     ALT 11/09/2017 17     Alkaline Phosphatase 11/09/2017 139*    Total Protein 11/09/2017 4 6*    Albumin 11/09/2017 2 2*    Total Bilirubin 11/09/2017 0 10*    eGFR 11/09/2017 28     WBC 11/09/2017 3 59*    RBC 11/09/2017 2 97*    Hemoglobin 11/09/2017 9 0*    Hematocrit 11/09/2017 29 9*    MCV 11/09/2017 101*    MCH 11/09/2017 30 3     MCHC 11/09/2017 30 1*    RDW 11/09/2017 14 1     Platelets 31/36/7397 208     MPV 11/09/2017 10 5        Prior to Admission Medications   Prescriptions Last Dose Informant Patient Reported? Taking?    Hydrocodone-Acetaminophen (LORCET 10/650 PO)   Yes No   Sig: Take by mouth 3 (three) times a day   QUEtiapine (SEROQUEL XR) 400 mg 24 hr tablet   Yes Yes   Sig: Take by mouth daily at bedtime     QUEtiapine (SEROQUEL) 300 mg tablet   Yes No   Sig: Take by mouth daily at bedtime     clomiPRAMINE (ANAFRANIL) 50 mg capsule   Yes Yes   Sig: Take by mouth   clonazePAM (KLONOPIN) 0 5 mg tablet   Yes Yes   Sig: Take by mouth 3 (three) times a day     lamoTRIgine (LaMICtal) 100 mg tablet   Yes No   Sig: Take by mouth 3 (three) times a day     omeprazole (PriLOSEC) 40 MG capsule   Yes Yes   Sig: Take by mouth      Facility-Administered Medications: None         Cornelio Mccullough MD

## 2017-11-09 NOTE — PHYSICIAN ADVISOR
Current patient class: Inpatient  The patient is currently on Hospital Day: 2      The patient was admitted to the hospital at 2015 on 11/7/17 for the following diagnosis:  Hypokalemia [E87 6]  Renal insufficiency [N28 9]  Prolonged QT interval [R94 31]  Abnormal laboratory test [R89 9]       There is documentation in the medical record of an expected length of stay of at least 2 midnights  The patient is therefore expected to satisfy the 2 midnight benchmark and given the 2 midnight presumption is appropriate for INPATIENT ADMISSION  Given this expectation of a satisfying stay, CMS instructs us that the patient is most often appropriate for inpatient admission under part A provided medical necessity is documented in the chart  After review of the relevant documentation, labs, vital signs and test results, the patient is appropriate for INPATIENT ADMISSION  Admission to the hospital as an inpatient is a complex decision making process which requires the practitioner to consider the patients presenting complaint, history and physical examination and all relevant testing  With this in mind, in this case, the patient was deemed appropriate for INPATIENT ADMISSION  After review of the documentation and testing available at the time of the admission I concur with this clinical determination of medical necessity  Rationale is as follows: The patient is a 56 yrs old Female who presented to the ED at 11/7/2017  6:32 PM with a chief complaint of Abnormal Lab (To ED for evaluation of low potassium/elevated kidney values  Denies any complaints   Patient states that she has been taking a daily potassium suppliment for one month )    The patients vitals on arrival were ED Triage Vitals [11/07/17 1839]   Temperature Pulse Respirations Blood Pressure SpO2   98 °F (36 7 °C) 80 20 145/77 99 %      Temp Source Heart Rate Source Patient Position - Orthostatic VS BP Location FiO2 (%)   Temporal Monitor Sitting Right arm --      Pain Score       No Pain           Past Medical History:   Diagnosis Date    Ankle sprain     left    Bipolar 2 disorder (HCC)     Chronic back pain     Hypercholesteremia     Panic attacks      Past Surgical History:   Procedure Laterality Date    TUBAL LIGATION Bilateral 1997           Consults have been placed to:   IP CONSULT TO INTERNAL MEDICINE  IP CONSULT TO NEPHROLOGY  IP CONSULT TO NUTRITION SERVICES    Vitals:    11/07/17 2127 11/07/17 2316 11/08/17 0723 11/08/17 1534   BP: 125/71 111/56 95/51 112/66   Pulse: 87 86 85 86   Resp: 18 16 17 18   Temp: 98 °F (36 7 °C) 98 °F (36 7 °C) 98 2 °F (36 8 °C) 98 8 °F (37 1 °C)   TempSrc: Oral Oral Oral Tympanic   SpO2: 96% 98% 98% 100%   Weight: 57 7 kg (127 lb 3 3 oz)  60 5 kg (133 lb 6 1 oz)    Height: 5' 7" (1 702 m)          Most recent labs:    Recent Labs      11/07/17   1912  11/08/17   0511  11/08/17   1708   WBC  7 68  7 02   --    HGB  11 1*  10 7*   --    HCT  34 2*  33 7*   --    PLT  233  219   --    K  2 6*  2 8*  3 5   NA  137  145  140   CALCIUM  8 3  8 2*  7 9*   BUN  21  21  17   CREATININE  2 76*  2 42*  2 22*   AST  19  13   --    ALT  28  22   --    ALKPHOS  188*  164*   --    BILITOT  0 30  0 20   --        Scheduled Meds:  clomiPRAMINE 50 mg Oral TID   clonazePAM 0 5 mg Oral TID   HYDROcodone-acetaminophen 2 tablet Oral TID   lamoTRIgine 100 mg Oral TID   pantoprazole 40 mg Oral Early Morning   potassium chloride 40 mEq Oral TID With Meals   QUEtiapine 400 mg Oral HS   QUEtiapine 300 mg Oral HS     Continuous Infusions:  sodium bicarbonate infusion 100 mL/hr Last Rate: 100 mL/hr (11/08/17 1650)     PRN Meds: ondansetron    Surgical procedures (if appropriate):

## 2017-11-10 VITALS
TEMPERATURE: 97.6 F | WEIGHT: 133.38 LBS | RESPIRATION RATE: 18 BRPM | OXYGEN SATURATION: 97 % | HEIGHT: 67 IN | SYSTOLIC BLOOD PRESSURE: 102 MMHG | BODY MASS INDEX: 20.93 KG/M2 | HEART RATE: 88 BPM | DIASTOLIC BLOOD PRESSURE: 58 MMHG

## 2017-11-10 PROBLEM — R94.31 PROLONGED Q-T INTERVAL ON ECG: Status: RESOLVED | Noted: 2017-11-07 | Resolved: 2017-11-10

## 2017-11-10 PROBLEM — E87.6 HYPOKALEMIA: Status: RESOLVED | Noted: 2017-01-17 | Resolved: 2017-11-10

## 2017-11-10 LAB
ANION GAP SERPL CALCULATED.3IONS-SCNC: 7 MMOL/L (ref 4–13)
BUN SERPL-MCNC: 19 MG/DL (ref 5–25)
CALCIUM SERPL-MCNC: 8.2 MG/DL (ref 8.3–10.1)
CHLORIDE SERPL-SCNC: 121 MMOL/L (ref 100–108)
CO2 SERPL-SCNC: 21 MMOL/L (ref 21–32)
CREAT SERPL-MCNC: 1.77 MG/DL (ref 0.6–1.3)
FERRITIN SERPL-MCNC: 50 NG/ML (ref 8–388)
FOLATE SERPL-MCNC: 13.5 NG/ML (ref 3.1–17.5)
GFR SERPL CREATININE-BSD FRML MDRD: 32 ML/MIN/1.73SQ M
GLUCOSE SERPL-MCNC: 88 MG/DL (ref 65–140)
IRON SATN MFR SERPL: 27 %
IRON SERPL-MCNC: 44 UG/DL (ref 50–170)
POTASSIUM SERPL-SCNC: 4.1 MMOL/L (ref 3.5–5.3)
SODIUM SERPL-SCNC: 149 MMOL/L (ref 136–145)
TIBC SERPL-MCNC: 164 UG/DL (ref 250–450)
VIT B12 SERPL-MCNC: 284 PG/ML (ref 100–900)

## 2017-11-10 PROCEDURE — 80048 BASIC METABOLIC PNL TOTAL CA: CPT | Performed by: INTERNAL MEDICINE

## 2017-11-10 PROCEDURE — 82746 ASSAY OF FOLIC ACID SERUM: CPT | Performed by: INTERNAL MEDICINE

## 2017-11-10 PROCEDURE — 83540 ASSAY OF IRON: CPT | Performed by: INTERNAL MEDICINE

## 2017-11-10 PROCEDURE — 82607 VITAMIN B-12: CPT | Performed by: INTERNAL MEDICINE

## 2017-11-10 PROCEDURE — 83550 IRON BINDING TEST: CPT | Performed by: INTERNAL MEDICINE

## 2017-11-10 PROCEDURE — 82728 ASSAY OF FERRITIN: CPT | Performed by: INTERNAL MEDICINE

## 2017-11-10 RX ORDER — DEXTROSE AND SODIUM CHLORIDE 5; .2 G/100ML; G/100ML
75 INJECTION, SOLUTION INTRAVENOUS CONTINUOUS
Status: DISCONTINUED | OUTPATIENT
Start: 2017-11-10 | End: 2017-11-11

## 2017-11-10 RX ADMIN — DEXTROSE AND SODIUM CHLORIDE 75 ML/HR: 5; .2 INJECTION, SOLUTION INTRAVENOUS at 22:11

## 2017-11-10 RX ADMIN — LAMOTRIGINE 100 MG: 100 TABLET ORAL at 15:16

## 2017-11-10 RX ADMIN — SODIUM CHLORIDE 75 ML/HR: 0.9 INJECTION, SOLUTION INTRAVENOUS at 02:05

## 2017-11-10 RX ADMIN — HYDROCODONE BITARTRATE AND ACETAMINOPHEN 2 TABLET: 5; 325 TABLET ORAL at 15:16

## 2017-11-10 RX ADMIN — LAMOTRIGINE 100 MG: 100 TABLET ORAL at 20:58

## 2017-11-10 RX ADMIN — HYDROCODONE BITARTRATE AND ACETAMINOPHEN 2 TABLET: 5; 325 TABLET ORAL at 20:58

## 2017-11-10 RX ADMIN — POTASSIUM CHLORIDE 40 MEQ: 1500 TABLET, EXTENDED RELEASE ORAL at 09:15

## 2017-11-10 RX ADMIN — LAMOTRIGINE 100 MG: 100 TABLET ORAL at 09:14

## 2017-11-10 RX ADMIN — CLOMIPRAMINE HYDROCHLORIDE 50 MG: 25 CAPSULE ORAL at 09:18

## 2017-11-10 RX ADMIN — HYDROCODONE BITARTRATE AND ACETAMINOPHEN 2 TABLET: 5; 325 TABLET ORAL at 09:14

## 2017-11-10 RX ADMIN — CLOMIPRAMINE HYDROCHLORIDE 50 MG: 25 CAPSULE ORAL at 15:17

## 2017-11-10 RX ADMIN — DEXTROSE AND SODIUM CHLORIDE 75 ML/HR: 5; .2 INJECTION, SOLUTION INTRAVENOUS at 09:16

## 2017-11-10 RX ADMIN — CLONAZEPAM 0.5 MG: 0.5 TABLET ORAL at 15:16

## 2017-11-10 RX ADMIN — CLOMIPRAMINE HYDROCHLORIDE 50 MG: 25 CAPSULE ORAL at 20:58

## 2017-11-10 RX ADMIN — POTASSIUM CHLORIDE 40 MEQ: 1500 TABLET, EXTENDED RELEASE ORAL at 17:23

## 2017-11-10 RX ADMIN — PANTOPRAZOLE SODIUM 40 MG: 40 TABLET, DELAYED RELEASE ORAL at 05:36

## 2017-11-10 RX ADMIN — QUETIAPINE 400 MG: 400 TABLET, EXTENDED RELEASE ORAL at 21:00

## 2017-11-10 RX ADMIN — QUETIAPINE FUMARATE 300 MG: 300 TABLET, FILM COATED ORAL at 21:00

## 2017-11-10 RX ADMIN — CLONAZEPAM 0.5 MG: 0.5 TABLET ORAL at 09:14

## 2017-11-10 RX ADMIN — CLONAZEPAM 0.5 MG: 0.5 TABLET ORAL at 20:58

## 2017-11-10 NOTE — PROGRESS NOTES
Progress Note - Nicole Foster 56 y o  female MRN: [id number]    Unit/Bed#: [address] [place] [address] Encounter: [id number]      Assessment:  Principal Problem:    Acute kidney injury superimposed on chronic kidney disease ([facility] )  Active Problems:    Bipolar 2 disorder ([facility] )    Chronic kidney disease, stage 3    Diarrhea due to laxative abuse  Resolved Problems:    Hypokalemia    Prolonged Q-T interval on ECG        Plan:  · Dehydration/hyponatremia due on admission due to laxative abuse issues-now potassium is improved ( 4 1 ) and has elevated sodium levels-nephrology input appreciated with change in IV fluids now on D 5-1/4  Will recheck labs in the a m  but hopefully have stable for discharge in the a m  of 11/11/17  · Acute acute kidney injury-creatinine improving but still elevated at 1 7  · Anemia-hemoglobin 9 0 with hydration-will check iron studies X58 and folic acid and heme test stools    Subjective:   Patient still reports feeling somewhat woozy and weak in general   She is complaining of feeling full in her abdomen  Discussed having her up and walking to help with  ROS  Comprehensive system review negative other than noted above    Objective:     Vitals: Blood pressure 120/62, pulse 95, temperature 98 8 °F (37 1 °C), temperature source Oral, resp  rate 17, height 5' 7" (1 702 m), weight 60 5 kg (133 lb 6 1 oz), SpO2 99 %, not currently breastfeeding  ,Body mass index is 20 89 kg/m²    Current Facility-Administered Medications   Medication Dose Route Frequency Provider Last Rate Last Dose    clomiPRAMINE (ANAFRANIL) capsule 50 mg  50 mg Oral TID EMERITA Curz   50 mg at 11/10/17 0918    clonazePAM (KlonoPIN) tablet 0 5 mg  0 5 mg Oral TID EMERITA Cruz   0 5 mg at 11/10/17 0914    dextrose 5 % and sodium chloride 0 2 % infusion  75 mL/hr Intravenous Continuous Breanna Bennett MD 75 mL/hr at 11/10/17 0916 75 mL/hr at 11/10/17 0916    HYDROcodone-acetaminophen (NORCO) 5-325 mg per tablet 2 tablet  2 tablet Oral TID EMERITA Cruz   2 tablet at 11/10/17 0914    lamoTRIgine (LaMICtal) tablet 100 mg  100 mg Oral TID JoseCONNIE GottliebNP   100 mg at 11/10/17 0914    ondansetron (ZOFRAN) injection 4 mg  4 mg Intravenous Q6H PRN EMERITA Cruz        pantoprazole (PROTONIX) EC tablet 40 mg  40 mg Oral Early Morning EMERITA Motta   40 mg at 11/10/17 0536    potassium chloride (K-DUR,KLOR-CON) CR tablet 40 mEq  40 mEq Oral BID Bebo William MD   40 mEq at 11/10/17 0915    QUEtiapine (SEROquel XR) 24 hr tablet 400 mg  400 mg Oral HS CONNIE MottaNP   400 mg at 11/09/17 2132    QUEtiapine (SEROquel) tablet 300 mg  300 mg Oral HS CONNIE MottaNP   300 mg at 11/09/17 2131     Prescriptions Prior to Admission   Medication    clomiPRAMINE (ANAFRANIL) 50 mg capsule    clonazePAM (KLONOPIN) 0 5 mg tablet    omeprazole (PriLOSEC) 40 MG capsule    QUEtiapine (SEROQUEL XR) 400 mg 24 hr tablet    Hydrocodone-Acetaminophen (LORCET 10/650 PO)    lamoTRIgine (LaMICtal) 100 mg tablet    QUEtiapine (SEROQUEL) 300 mg tablet         Intake/Output Summary (Last 24 hours) at 11/10/17 1021  Last data filed at 11/10/17 0205   Gross per 24 hour   Intake              980 ml   Output                0 ml   Net              980 ml       Physical Exam:  General appearance: alert and cooperative  Neck: no adenopathy, no carotid bruit and thyroid not enlarged, symmetric, no tenderness/mass/nodules  Lungs: clear to auscultation bilaterally  Heart: regular rate and rhythm, S1, S2 normal, no murmur, click, rub or gallop  Abdomen: Active bowel sounds, no distension  Extremities: extremities normal, atraumatic, no cyanosis or edema  Skin: Skin color, texture, turgor normal  No rashes or lesions  Neurologic: Grossly normal       Lab, Imaging and other studies: I have personally reviewed pertinent reports              Results from last 7 days  Lab Units 11/09/17  0518 11/08/17  0280 11/07/17  1912   WBC Thousand/uL 3 59* 7 02 7 68   HEMOGLOBIN g/dL 9 0* 10 7* 11 1*   HEMATOCRIT % 29 9* 33 7* 34 2*   PLATELETS Thousands/uL 208 219 233   NEUTROS PCT %  --   --  67   LYMPHS PCT %  --   --  22   MONOS PCT %  --   --  9   EOS PCT %  --   --  1       Results from last 7 days  Lab Units 11/10/17  0506 11/09/17  0518 11/08/17  1708 11/08/17  0511 11/07/17  1912   SODIUM mmol/L 149* 146* 140 145 137   POTASSIUM mmol/L 4 1 3 8 3 5 2 8* 2 6*   CHLORIDE mmol/L 121* 120* 112* 115* 106   CO2 mmol/L 21 21 19* 18* 20*   BUN mg/dL 19 20 17 21 21   CREATININE mg/dL 1 77* 1 96* 2 22* 2 42* 2 76*   CALCIUM mg/dL 8 2* 7 9* 7 9* 8 2* 8 3   TOTAL PROTEIN g/dL  --  4 6*  --  5 1* 6 0*   BILIRUBIN TOTAL mg/dL  --  0 10*  --  0 20 0 30   ALK PHOS U/L  --  139*  --  164* 188*   ALT U/L  --  17  --  22 28   AST U/L  --  12  --  13 19   GLUCOSE RANDOM mg/dL 88 96 96 97 93     No results found for: TROPONINI, CKTOTAL      Lab Results   Component Value Date    URINECX >100,000 cfu/ml Escherichia coli 01/18/2017    URINECX 60,000-69,000 cfu/ml Klebsiella oxytoca 01/18/2017       Imaging:  No results found for this or any previous visit  No results found for this or any previous visit  PATIENT CENTERED ROUNDS: I have performed rounds with the nursing staff            Betsey Barkley DO

## 2017-11-10 NOTE — PROGRESS NOTES
NEPHROLOGY PROGRESS NOTE    Lisette Rosas 56 y o  female MRN: [id number]  Unit/Bed#: [address] [place] [address] Encounter: [id number]  Reason for Consult:  Acute renal failure and hypernatremia    ASSESSMENT/PLAN:  1  Renal   Patient acute renal failure creatinine is slowly improve after hydration  It seems to be leveling off but is 1 7  Continue to monitor  No changes  2   Hypernatremia  Patient is accumulating a water deficit so will change fluids to hypotonic variety of D5 1/4 normal at 75 cc/hour  Encourage p o  Intake    3  Metabolic acidosis with hypokalemia  This is resolved but is due to laxative abuse  Since she has been in the hospital she has been unable to do that the diarrhea has slowed down  She is under care for that as well  SUBJECTIVE:  Review of Systems   Constitution: Negative for chills and fever  HENT: Negative  Eyes: Negative  Cardiovascular: Negative  Respiratory: Negative  Gastrointestinal:        Less diarrhea no vomiting   Genitourinary: Negative  OBJECTIVE:  Current Weight: Weight - Scale: 60 5 kg (133 lb 6 1 oz)  Vitals:Temp (24hrs), Av 3 °F (36 8 °C), Min:98 °F (36 7 °C), Max:98 8 °F (37 1 °C)  Current: Temperature: 98 8 °F (37 1 °C)   Blood pressure 120/62, pulse 95, temperature 98 8 °F (37 1 °C), temperature source Oral, resp  rate 17, height 5' 7" (1 702 m), weight 60 5 kg (133 lb 6 1 oz), SpO2 99 %, not currently breastfeeding  ,Body mass index is 20 89 kg/m²  Intake/Output Summary (Last 24 hours) at 11/10/17 0903  Last data filed at 11/10/17 0205   Gross per 24 hour   Intake              980 ml   Output                0 ml   Net              980 ml       Physical Exam: /62   Pulse 95   Temp 98 8 °F (37 1 °C) (Oral)   Resp 17   Ht 5' 7" (1 702 m)   Wt 60 5 kg (133 lb 6 1 oz)   LMP  (LMP Unknown)   SpO2 99%   Breastfeeding? No   BMI 20 89 kg/m²   Physical Exam   Constitutional: No distress     HENT:   Mouth/Throat: No oropharyngeal exudate  Eyes: No scleral icterus  Neck: No JVD present  Cardiovascular: Normal rate  Exam reveals no friction rub  Pulmonary/Chest: Effort normal and breath sounds normal  No respiratory distress  She has no wheezes  She has no rales  Abdominal: Soft  Bowel sounds are normal  She exhibits no distension  There is no tenderness  There is no rebound         Medications:    Current Facility-Administered Medications:     clomiPRAMINE (ANAFRANIL) capsule 50 mg, 50 mg, Oral, TID, Gabriela Macritchie, CRNP, 50 mg at 11/09/17 2131    clonazePAM (KlonoPIN) tablet 0 5 mg, 0 5 mg, Oral, TID, Raven Sox, CRNP, 0 5 mg at 11/09/17 2131    dextrose 5 % and sodium chloride 0 2 % infusion, 75 mL/hr, Intravenous, Continuous, Cheryle Payne MD    HYDROcodone-acetaminophen [facility]) 5-325 mg per tablet 2 tablet, 2 tablet, Oral, TID, Raven Sox, CRNP, 2 tablet at 11/09/17 2131    lamoTRIgine (LaMICtal) tablet 100 mg, 100 mg, Oral, TID, Raven Sox, CRNP, 100 mg at 11/09/17 2131    ondansetron (ZOFRAN) injection 4 mg, 4 mg, Intravenous, Q6H PRN, Raven Sox, CRNP    pantoprazole (PROTONIX) EC tablet 40 mg, 40 mg, Oral, Early Morning, Gabriela Macritchie, CRNP, 40 mg at 11/10/17 0536    potassium chloride (K-DUR,KLOR-CON) CR tablet 40 mEq, 40 mEq, Oral, BID, Pedro Ascencio MD, 40 mEq at 11/09/17 1730    QUEtiapine (SEROquel XR) 24 hr tablet 400 mg, 400 mg, Oral, HS, Gabriela Macritchie, CRNP, 400 mg at 11/09/17 2132    QUEtiapine (SEROquel) tablet 300 mg, 300 mg, Oral, HS, Gabriela Macritchie, CRNP, 300 mg at 11/09/17 2131    Laboratory Results:  Lab Results   Component Value Date    WBC 3 59 (L) 11/09/2017    HGB 9 0 (L) 11/09/2017    HCT 29 9 (L) 11/09/2017     (H) 11/09/2017     11/09/2017     Lab Results   Component Value Date    GLUCOSE 88 11/10/2017    CALCIUM 8 2 (L) 11/10/2017     (H) 11/10/2017    K 4 1 11/10/2017    CO2 21 11/10/2017     (H) 11/10/2017    BUN 19 11/10/2017 CREATININE 1 77 (H) 11/10/2017     Lab Results   Component Value Date    CALCIUM 8 2 (L) 11/10/2017    PHOS 5 1 (H) 11/08/2017     No results found for: LABPROT

## 2017-11-11 PROBLEM — F55.2 DIARRHEA DUE TO LAXATIVE ABUSE: Status: RESOLVED | Noted: 2017-11-08 | Resolved: 2017-11-11

## 2017-11-11 PROBLEM — N18.9 ACUTE KIDNEY INJURY SUPERIMPOSED ON CHRONIC KIDNEY DISEASE (HCC): Status: RESOLVED | Noted: 2017-01-17 | Resolved: 2017-11-11

## 2017-11-11 PROBLEM — N17.9 ACUTE KIDNEY INJURY SUPERIMPOSED ON CHRONIC KIDNEY DISEASE (HCC): Status: RESOLVED | Noted: 2017-01-17 | Resolved: 2017-11-11

## 2017-11-11 LAB
ANION GAP SERPL CALCULATED.3IONS-SCNC: 6 MMOL/L (ref 4–13)
BUN SERPL-MCNC: 25 MG/DL (ref 5–25)
CALCIUM SERPL-MCNC: 7.9 MG/DL (ref 8.3–10.1)
CHLORIDE SERPL-SCNC: 115 MMOL/L (ref 100–108)
CO2 SERPL-SCNC: 24 MMOL/L (ref 21–32)
CREAT SERPL-MCNC: 1.81 MG/DL (ref 0.6–1.3)
ERYTHROCYTE [DISTWIDTH] IN BLOOD BY AUTOMATED COUNT: 14 % (ref 11.6–15.1)
GFR SERPL CREATININE-BSD FRML MDRD: 31 ML/MIN/1.73SQ M
GLUCOSE SERPL-MCNC: 92 MG/DL (ref 65–140)
HCT VFR BLD AUTO: 29.7 % (ref 34.8–46.1)
HGB BLD-MCNC: 9 G/DL (ref 11.5–15.4)
MCH RBC QN AUTO: 31 PG (ref 26.8–34.3)
MCHC RBC AUTO-ENTMCNC: 30.3 G/DL (ref 31.4–37.4)
MCV RBC AUTO: 102 FL (ref 82–98)
PLATELET # BLD AUTO: 214 THOUSANDS/UL (ref 149–390)
PMV BLD AUTO: 10.4 FL (ref 8.9–12.7)
POTASSIUM SERPL-SCNC: 4.8 MMOL/L (ref 3.5–5.3)
RBC # BLD AUTO: 2.9 MILLION/UL (ref 3.81–5.12)
SODIUM SERPL-SCNC: 145 MMOL/L (ref 136–145)
WBC # BLD AUTO: 3.8 THOUSAND/UL (ref 4.31–10.16)

## 2017-11-11 PROCEDURE — 80048 BASIC METABOLIC PNL TOTAL CA: CPT | Performed by: INTERNAL MEDICINE

## 2017-11-11 PROCEDURE — 85027 COMPLETE CBC AUTOMATED: CPT | Performed by: INTERNAL MEDICINE

## 2017-11-11 RX ADMIN — HYDROCODONE BITARTRATE AND ACETAMINOPHEN 2 TABLET: 5; 325 TABLET ORAL at 08:09

## 2017-11-11 RX ADMIN — POTASSIUM CHLORIDE 40 MEQ: 1500 TABLET, EXTENDED RELEASE ORAL at 08:09

## 2017-11-11 RX ADMIN — LAMOTRIGINE 100 MG: 100 TABLET ORAL at 08:09

## 2017-11-11 RX ADMIN — CLOMIPRAMINE HYDROCHLORIDE 50 MG: 25 CAPSULE ORAL at 08:13

## 2017-11-11 RX ADMIN — PANTOPRAZOLE SODIUM 40 MG: 40 TABLET, DELAYED RELEASE ORAL at 06:02

## 2017-11-11 RX ADMIN — CLONAZEPAM 0.5 MG: 0.5 TABLET ORAL at 08:09

## 2017-11-11 NOTE — PROGRESS NOTES
NEPHROLOGY PROGRESS NOTE   Demar Moralez 56 y o  female MRN: [id number]  Unit/Bed#: [address] [place], [place] [id number] Encounter: [id number]  Reason for Consult:  Acute kidney injury, chronic kidney disease    ASSESSMENT and PLAN:  1  Acute kidney injury on chronic kidney disease:  Resolved  Prerenal/volume depletion 2/2 laxative induced diarrhea- improving with hydration  · Creatinine 2 76 on admission slowly declining to 1 77 on 11/10/17 which is baseline  · Creatinine 1 8 today which is baseline  · Renal function stable  2  Chronic kidney disease stage III:  Patient follows with Dr Benjamin Gomez for management  Last seen March 2017 after hospitalization in January for acute kidney injury related to gastroenteritis  Creatinine peaked at 4 1 and was down to 2 1 at discharge  · History of lithium use 20 years ago  At that time baseline creatinine was 1 6  · Previous renal ultrasound shows marked echogenicity and a right renal cyst   · When patient was last seen by Dr Benjamin Gomez baseline creatinine was felt to be ~1 8  · I had a long discussion with patient regarding her chronic kidney disease  I explained to her the nature of her disease and the stage of kidney disease that she was in     She stated she was not aware that she had any kidney disease although she initially told me that [place] injured her kidneys a long time ago  She was grateful for the information  All questions were answered  3  Hypernatremia:  Hypotonic fluids initiated D5 quarter normal saline at 75 mL an hour on 11/10  · Sodium level 145  · Patient encouraged to take adequate amounts of fluids  Avoid laxative use  4  Metabolic acidosis with hypokalemia:  Felt to be due to laxative abuse  Improving with resolution of GI losses  5  Anemia:  Iron studies, S07 and folic acid pending  FOBT pending  6  Bipolar disorder  7  Diarrhea:  Secondary to laxative abuse  8   Prolonged QT interval    SUMMARY OF RECOMMENDATIONS:  · Follow-up with Dr Naun Mendoza will call for an appointment on Monday 11/13  · Patient encouraged to avoid dehydration-no laxatives, drink adequate amounts of fluids particularly water  Avoid large amounts of coffee and tea along with soda  · Patient told to avoid the use of nonsteroidal anti-inflammatory drugs  · Follow-up labs placed in discharge orders    SUBJECTIVE / INTERVAL HISTORY:  States she is feeling good  She is eating and drinking normally      OBJECTIVE:  Current Weight: Weight - Scale: 60 5 kg (133 lb 6 1 oz)  Vitals:    11/10/17 0841 11/10/17 1500 11/10/17 2300 11/10/17 2352   BP: 120/62 120/65 (!) 87/53 102/58   Pulse: 95 91 94 88   Resp: 17 18 18    Temp: 98 8 °F (37 1 °C) 98 4 °F (36 9 °C) 97 6 °F (36 4 °C)    TempSrc: Oral Oral Oral    SpO2: 99% 94% 97%    Weight:       Height:           Intake/Output Summary (Last 24 hours) at 11/11/17 1306  Last data filed at 11/11/17 0600   Gross per 24 hour   Intake           1792 5 ml   Output                0 ml   Net           1792 5 ml     General:  No acute distress  Skin:  Warm and dry  Eyes:  Sclera clear  ENT:  Oropharynx moist  Neck:  Supple no JVD  Chest:  Clear bilaterally  CVS:  Regular rhythm, S1 and S2  Abdomen:  Soft, nondistended, nontender, bowel sounds present  Extremities:  No edema  :  Voiding QS  Neuro:  Alert and oriented  Psych:  Appropriate, pleasant    Medications:    Current Facility-Administered Medications:     clomiPRAMINE (ANAFRANIL) capsule 50 mg, 50 mg, Oral, TID, EMERITA Motta, 50 mg at 11/11/17 0813    clonazePAM (KlonoPIN) tablet 0 5 mg, 0 5 mg, Oral, TID, Adina Aase Macritchie, CRNP, 0 5 mg at 11/11/17 0809    HYDROcodone-acetaminophen (NORCO) 5-325 mg per tablet 2 tablet, 2 tablet, Oral, TID, EMERITA Bajwa, 2 tablet at 11/11/17 0809    lamoTRIgine (LaMICtal) tablet 100 mg, 100 mg, Oral, TID, EMERITA Motta, 100 mg at 11/11/17 0809    ondansetron (ZOFRAN) injection 4 mg, 4 mg, Intravenous, Q6H PRN, EMERITA Bajwa   pantoprazole (PROTONIX) EC tablet 40 mg, 40 mg, Oral, Early Morning, Gabriela Guthriealejandrina, CRNP, 40 mg at 11/11/17 0602    QUEtiapine (SEROquel XR) 24 hr tablet 400 mg, 400 mg, Oral, HS, Gabriela Macritchie, CRNP, 400 mg at 11/10/17 2100    QUEtiapine (SEROquel) tablet 300 mg, 300 mg, Oral, HS, Gabriela Macritchie, CRNP, 300 mg at 11/10/17 2100    Laboratory Results:    Results from last 7 days  Lab Units 11/11/17  0558 11/10/17  0506 11/09/17  0518 11/08/17  1708 11/08/17  0511 11/07/17  1912   WBC Thousand/uL 3 80*  --  3 59*  --  7 02 7 68   HEMOGLOBIN g/dL 9 0*  --  9 0*  --  10 7* 11 1*   HEMATOCRIT % 29 7*  --  29 9*  --  33 7* 34 2*   PLATELETS Thousands/uL 214  --  208  --  219 233   SODIUM mmol/L 145 149* 146* 140 145 137   POTASSIUM mmol/L 4 8 4 1 3 8 3 5 2 8* 2 6*   CHLORIDE mmol/L 115* 121* 120* 112* 115* 106   CO2 mmol/L 24 21 21 19* 18* 20*   BUN mg/dL 25 19 20 17 21 21   CREATININE mg/dL 1 81* 1 77* 1 96* 2 22* 2 42* 2 76*   CALCIUM mg/dL 7 9* 8 2* 7 9* 7 9* 8 2* 8 3   MAGNESIUM mg/dL  --   --   --   --   --  2 1   PHOSPHORUS mg/dL  --   --   --   --  5 1* 6 4*   ALBUMIN g/dL  --   --  2 2*  --  2 6* 3 2*   TOTAL PROTEIN g/dL  --   --  4 6*  --  5 1* 6 0*   GLUCOSE RANDOM mg/dL 92 88 96 96 97 93     Previous work up:

## 2017-11-11 NOTE — DISCHARGE SUMMARY
Discharge Summary - William Bey 56 y o  female MRN: [id number]    Unit/Bed#: [address] [place] [address] Encounter: [id number]    Admission Date: 11/7/2017     Admitting Diagnosis: Hypokalemia [E87 6]  Renal insufficiency [N28 9]  Prolonged QT interval [R94 31]  Abnormal laboratory test [R89 9]    HPI: Please refer to H+P on the chart! Procedures Performed:   Orders Placed This Encounter   Procedures    ED ECG Documentation Only       Hospital Course:  Patient admitted with acute kidney injury on stage III chronic disease due to dehydration from laxative abuse  She had hypokalemia and normal anion gap metabolic acidosis due to diarrhea  Nephrology was seeing the patient who adjusted IV fluids adding bicarbonates and replacing potassium as well  Patient's renal function improved to baseline and was discharged in stable condition with a creatinine of 1 81  She was advised not to use laxatives  She wishes to follow up with me in the office  Significant Findings, Care, Treatment and Services Provided: see Hospital Course! Complications: none    Discharge Diagnosis:   Patient Active Problem List    Diagnosis Date Noted    Diarrhea due to laxative abuse 11/08/2017    Chronic kidney disease, stage 3 11/07/2017    Spondylolisthesis at L5-S1 level 01/20/2017    Acute kidney injury superimposed on chronic kidney disease ([facility] ) 01/17/2017    Bipolar 2 disorder ([facility] ) 01/17/2017    Hypercholesteremia 01/17/2017         Condition at Discharge: stable     Discharge instructions/Information to patient and family:   See after visit summary for information provided to patient and family  Provisions for Follow-Up Care:  See after visit summary for information related to follow-up care and any pertinent home health orders  Disposition: Home    Planned Readmission: No    Discharge Statement    I had direct contact with the patient on the day of discharge       Discharge Medications:  See after visit summary for reconciled discharge medications provided to patient and family        Rimma Abdi MD

## 2017-12-11 ENCOUNTER — GENERIC CONVERSION - ENCOUNTER (OUTPATIENT)
Dept: OTHER | Facility: OTHER | Age: 57
End: 2017-12-11

## 2017-12-11 ENCOUNTER — HOSPITAL ENCOUNTER (OUTPATIENT)
Dept: BONE DENSITY | Facility: IMAGING CENTER | Age: 57
Discharge: HOME | End: 2017-12-11
Payer: MEDICARE

## 2017-12-11 DIAGNOSIS — Z13.820 SCREENING FOR OSTEOPOROSIS: ICD-10-CM

## 2017-12-11 PROCEDURE — 77080 DXA BONE DENSITY AXIAL: CPT

## 2017-12-19 ENCOUNTER — APPOINTMENT (OUTPATIENT)
Dept: LAB | Facility: HOSPITAL | Age: 57
End: 2017-12-19
Attending: INTERNAL MEDICINE
Payer: MEDICARE

## 2017-12-19 ENCOUNTER — TRANSCRIBE ORDERS (OUTPATIENT)
Dept: ADMINISTRATIVE | Facility: HOSPITAL | Age: 57
End: 2017-12-19

## 2017-12-19 DIAGNOSIS — N18.30 CHRONIC KIDNEY DISEASE, STAGE III (MODERATE) (HCC): ICD-10-CM

## 2017-12-19 LAB
25(OH)D3 SERPL-MCNC: 7.4 NG/ML (ref 30–100)
ALBUMIN SERPL BCP-MCNC: 3.4 G/DL (ref 3.5–5)
ALP SERPL-CCNC: 261 U/L (ref 46–116)
ALT SERPL W P-5'-P-CCNC: 31 U/L (ref 12–78)
ANION GAP SERPL CALCULATED.3IONS-SCNC: 11 MMOL/L (ref 4–13)
AST SERPL W P-5'-P-CCNC: 15 U/L (ref 5–45)
BACTERIA UR QL AUTO: ABNORMAL /HPF
BASOPHILS # BLD AUTO: 0.05 THOUSANDS/ΜL (ref 0–0.1)
BASOPHILS NFR BLD AUTO: 1 % (ref 0–1)
BILIRUB SERPL-MCNC: 0.3 MG/DL (ref 0.2–1)
BILIRUB UR QL STRIP: NEGATIVE
BUN SERPL-MCNC: 13 MG/DL (ref 5–25)
CALCIUM SERPL-MCNC: 8.5 MG/DL (ref 8.3–10.1)
CHLORIDE SERPL-SCNC: 113 MMOL/L (ref 100–108)
CLARITY UR: CLEAR
CO2 SERPL-SCNC: 23 MMOL/L (ref 21–32)
COLOR UR: YELLOW
CREAT SERPL-MCNC: 1.79 MG/DL (ref 0.6–1.3)
EOSINOPHIL # BLD AUTO: 0.08 THOUSAND/ΜL (ref 0–0.61)
EOSINOPHIL NFR BLD AUTO: 2 % (ref 0–6)
ERYTHROCYTE [DISTWIDTH] IN BLOOD BY AUTOMATED COUNT: 13.7 % (ref 11.6–15.1)
GFR SERPL CREATININE-BSD FRML MDRD: 31 ML/MIN/1.73SQ M
GLUCOSE P FAST SERPL-MCNC: 104 MG/DL (ref 65–99)
GLUCOSE UR STRIP-MCNC: NEGATIVE MG/DL
HCT VFR BLD AUTO: 37.4 % (ref 34.8–46.1)
HGB BLD-MCNC: 11.2 G/DL (ref 11.5–15.4)
HGB UR QL STRIP.AUTO: NEGATIVE
KETONES UR STRIP-MCNC: NEGATIVE MG/DL
LEUKOCYTE ESTERASE UR QL STRIP: ABNORMAL
LYMPHOCYTES # BLD AUTO: 1.1 THOUSANDS/ΜL (ref 0.6–4.47)
LYMPHOCYTES NFR BLD AUTO: 28 % (ref 14–44)
MAGNESIUM SERPL-MCNC: 2.1 MG/DL (ref 1.6–2.6)
MCH RBC QN AUTO: 30.5 PG (ref 26.8–34.3)
MCHC RBC AUTO-ENTMCNC: 29.9 G/DL (ref 31.4–37.4)
MCV RBC AUTO: 102 FL (ref 82–98)
MONOCYTES # BLD AUTO: 0.31 THOUSAND/ΜL (ref 0.17–1.22)
MONOCYTES NFR BLD AUTO: 8 % (ref 4–12)
NEUTROPHILS # BLD AUTO: 2.36 THOUSANDS/ΜL (ref 1.85–7.62)
NEUTS SEG NFR BLD AUTO: 61 % (ref 43–75)
NITRITE UR QL STRIP: NEGATIVE
NON-SQ EPI CELLS URNS QL MICRO: ABNORMAL /HPF
PH UR STRIP.AUTO: 6 [PH] (ref 4.5–8)
PHOSPHATE SERPL-MCNC: 3.3 MG/DL (ref 2.7–4.5)
PLATELET # BLD AUTO: 333 THOUSANDS/UL (ref 149–390)
PMV BLD AUTO: 9.9 FL (ref 8.9–12.7)
POTASSIUM SERPL-SCNC: 3.2 MMOL/L (ref 3.5–5.3)
PROT SERPL-MCNC: 6.4 G/DL (ref 6.4–8.2)
PROT UR STRIP-MCNC: NEGATIVE MG/DL
PTH-INTACT SERPL-MCNC: 131.3 PG/ML (ref 14–72)
RBC # BLD AUTO: 3.67 MILLION/UL (ref 3.81–5.12)
RBC #/AREA URNS AUTO: ABNORMAL /HPF
SODIUM SERPL-SCNC: 147 MMOL/L (ref 136–145)
SP GR UR STRIP.AUTO: <=1.005 (ref 1–1.03)
URATE SERPL-MCNC: 4.5 MG/DL (ref 2–6.8)
UROBILINOGEN UR QL STRIP.AUTO: 0.2 E.U./DL
WBC # BLD AUTO: 3.9 THOUSAND/UL (ref 4.31–10.16)
WBC #/AREA URNS AUTO: ABNORMAL /HPF

## 2017-12-19 PROCEDURE — 83735 ASSAY OF MAGNESIUM: CPT

## 2017-12-19 PROCEDURE — 84100 ASSAY OF PHOSPHORUS: CPT

## 2017-12-19 PROCEDURE — 82306 VITAMIN D 25 HYDROXY: CPT

## 2017-12-19 PROCEDURE — 36415 COLL VENOUS BLD VENIPUNCTURE: CPT

## 2017-12-19 PROCEDURE — 81001 URINALYSIS AUTO W/SCOPE: CPT

## 2017-12-19 PROCEDURE — 80053 COMPREHEN METABOLIC PANEL: CPT

## 2017-12-19 PROCEDURE — 83970 ASSAY OF PARATHORMONE: CPT

## 2017-12-19 PROCEDURE — 85025 COMPLETE CBC W/AUTO DIFF WBC: CPT

## 2017-12-19 PROCEDURE — 84550 ASSAY OF BLOOD/URIC ACID: CPT

## 2017-12-20 ENCOUNTER — ALLSCRIPTS OFFICE VISIT (OUTPATIENT)
Dept: OTHER | Facility: OTHER | Age: 57
End: 2017-12-20

## 2017-12-21 NOTE — PROGRESS NOTES
Assessment   1  CAM (acute kidney injury) (584 9) (N17 9)   2  CKD (chronic kidney disease), stage III (585 3) (N18 3)   3  Hypokalemia (276 8) (E87 6)   4  Vitamin D deficiency (268 9) (E55 9)   5  Secondary renal hyperparathyroidism (588 81) (N25 81)   6  Hypernatremia (276 0) (E87 0)    Plan   CKD (chronic kidney disease), stage III    · Vitamin D3 95368 UNIT Oral Capsule; TAKE 1 CAPSULE Weekly   Rx By: Chelsey Garcia; Dispense: 8 Days ; #:8 Capsule; Refill: 0;For: CKD (chronic kidney disease), stage III; JUDI = N; Verified Transmission to [facility] 5862; Last Updated By: System, SureScripts; 2017 2:11:28 PM   · (1) CBC/PLT/DIFF; Status:Active; Requested WOZ:[id number]; Perform:Located within [facility]; RQU:[id number];MADQREH; For:CKD (chronic kidney disease), stage III; Ordered By:Arturo Mills;   · (1) COMPREHENSIVE METABOLIC PANEL; Status:Active; Requested NCF:[id number]; Perform:Located within [facility]; GSC:[id number];ZNPMXAD; For:CKD (chronic kidney disease), stage III; Ordered By:Arturo Mills;   · (1) MAGNESIUM; Status:Active; Requested FTW:[id number]; Perform:Located within [facility]; EWQ:42OHY1352;QPJBRXJ; For:CKD (chronic kidney disease), stage III; Ordered By:Arturo Mills;   · (1) PHOSPHORUS; Status:Active; Requested UUT:[id number]; Perform:Located within [facility]; PNJ:[id number];ECYSLOD; For:CKD (chronic kidney disease), stage III; Ordered By:Arturo Mills;   · (1) PTH N-TERMINAL (INTACT); Status:Active; Requested QPF:[id number]; Perform:Located within [facility]; EFF:[id number];TWEJSNT; For:CKD (chronic kidney disease), stage III; Ordered By:Arturo Mills;   · (1) URINALYSIS (will reflex a microscopy if leukocytes, occult blood, protein or nitrites are    not within normal limits); Status:Active; Requested [id number]; Perform:Located within [facility]; LGN:[id number];TPTKGQV; For:CKD (chronic kidney disease), stage III; Ordered By:Arturo Mills;   · (1) URINALYSIS WITH MICROSCOPIC; Status:Active; Requested [facility]:[id number]; Perform:formerly [facility]; LEH:87PPK2594;NMMZRKA; For:CKD (chronic kidney disease), stage III; Ordered By:Arturo Mills;   · (1) URINE PROTEIN CREATININE RATIO; Status:Active; Requested MUF:[id number]; Perform:formerly [facility]; LBA:[id number];LTRBWXE; For:CKD (chronic kidney disease), stage III; Ordered By:Arturo Mills;   · (Q) URINALYSIS MICROSCOPIC; Status:Active; Requested MFY:80HCN3783; Perform:[facility]; NWO:[id number];WKAURXF; For:CKD (chronic kidney disease), stage III; Ordered By:Arturo Mills;   · Follow-up visit in 3 months Evaluation and Treatment  Follow-up  Status: Complete     Done: 33TMJ7793   Ordered; For: CKD (chronic kidney disease), stage III; Ordered By: Richard Martinez Performed:  Due: 39CZU8829; Last Updated By: Candice Wade; 2017 2:18:07 PM  Hypokalemia    · Klor-Con M20 20 MEQ Oral Tablet Extended Release; TAKE 1 TABLET TWICE    DAILY   Rx By: Richard Martinez; Dispense: 15 Days ; #:30 Tablet Extended Release; Refill: 0;For: Hypokalemia; JUDI = N; Record   · (1) COMPREHENSIVE METABOLIC PANEL; Status:Active; Requested LXW:[id number]; Perform:formerly [facility]; XKE:57QBX7707;CAOUWBS;NXX:YDQOBVPVQYY; Ordered By:Arturo Mills;  Vitamin D deficiency    · Vitamin D3 2000 UNIT Oral Capsule; TAKE 1 CAPSULE DAILY AFTER 8 WEEKS AS    DISCUSSED   Rx By: Richard Martinez; Dispense: 90 Days ; #:90 Capsule; Refill: 3;For: Vitamin D deficiency; JUDI = N; Verified Transmission to [facility] 0056; Last Updated By: System, SureScripts; 2017 2:11:28 PM  Unlinked    · Ibuprofen 600 MG Oral Tablet   Dispense: 0 Days ; #: Sufficient Tablet; Refill: 0; JUDI = N; Record; Last Updated By: Richard Martinez; 2017 2:09:55 PM    Discussion/Summary      65-year-old female past medical history of bipolar disorder and hyperlipidemia presents for evaluation of an elevated creatinine  At this point I recommend the followin   Acute kidney injury- this was related to a pre renal azotemia associated with chronic lactulose abuse and diarrhea  This has since resolved since her hospitalization and her creatinine is at her baseline at 1 8     2  Stage IIIB chronic kidney disease her creatinine is at baseline currently she does have an estimated GFR of 31  This is likely related to a chronic interstitial nephritis with lithium use  She also has a slightly high sodium and could have a diabetes insipidus  She is taking ibuprofen which I have asked her to discontinue and I have asked her to drink at least 60 oz of fluid daily     3  Hypokalemia- this is likely related to her chronic lactulose use  Have asked her to start taking 20 mEq of p  o  potassium twice daily  Will repeat a basic metabolic profile and if this does not improve in 2 weeks further recommendations and workup will be made     4  hypernatremia- she may have an element of diabetes insipidus, she does have a high salt intake with no fluid intake she also has a lactulose abuse history  Will repeat a metabolic profile in 2 weeks and if it is still elevated will workup further     5  Vitamin-D deficiency- [place] her on [address]78 units of vitamin D3 weekly followed by 2000 units of vitamin D3 daily     6  Secondary hyperparathyroidism- repeat her vitamin-D level in for 3-4 months after vitamin-D repletion  Hopefully PTH will improve  Calcium is normal      Will follow up with repeat blood work in January and if everything is stable follow-up with surveillance blood work in 3 months     sincerely,   [facility]  Reason For Visit   Follow-up from the hospital      History of Present Illness   42-year-old with a history of bipolar disorder, hyperlipidemia who presents for evaluation of an elevated creatinine  was on lithium over 20 years ago at which point she states that her creatinine at baseline was 1 6 at   At that time she was taken off her lithium and was started on other medications  recently again presented to [facility] where she was found to have an acute kidney injury on chronic kidney disease and hypokalemia this was secondary to laxative abuse  This did improve with normal saline to her baseline creatinine of 1 79  She continues to take lactulose for constipation  She was also on ibuprofen  Her blood pressures have been relatively stable  Her mood and bipolar disorder has been relatively stable to she has been off lithium and is currently on Seroquel  She does have a psychiatrist and a therapist that she sees regularly  urine output has been stable she does admit to poor oral intake  She does have potassium supplements at home but is not taking them currently  Review of Systems        Constitutional: no fever-- and-- no chills  Integumentary: no rashes  Gastrointestinal: no nausea,-- no diarrhea-- and-- no vomiting  Respiratory: no shortness of breath  Cardiovascular: no chest pain-- and-- no lower extremity edema  Musculoskeletal: no joint pain-- and-- no joint swelling  Neurological: no headache,-- no lightheadedness-- and-- no dizziness  Genitourinary: no dysuria  ROS reviewed  Constitutional: No fever, no chills, feels well, no tiredness, no recent weight gain or weight loss  Eyes: No complaints of eye pain, no red eyes, no eyesight problems, no discharge, no dry eyes, no itching of eyes  ENT: no complaints of earache, no loss of hearing, no nose bleeds, no nasal discharge, no sore throat, no hoarseness  Cardiovascular: No complaints of slow heart rate, no fast heart rate, no chest pain, no palpitations, no leg claudication, no lower extremity edema  Respiratory: No complaints of shortness of breath, no wheezing, no cough, no SOB on exertion, no orthopnea, no PND        Gastrointestinal: No complaints of abdominal pain, no constipation, no nausea or vomiting, no diarrhea, no bloody stools  Genitourinary: No complaints of dysuria, no incontinence, no pelvic pain, no dysmenorrhea, no vaginal discharge or bleeding  Musculoskeletal: No complaints of arthralgias, no myalgias, no joint swelling or stiffness, no limb pain or swelling  Integumentary: No complaints of skin rash or lesions, no itching, no skin wounds, no breast pain or lump  Neurological: No complaints of headache, no confusion, no convulsions, no numbness, no dizziness or fainting, no tingling, no limb weakness, no difficulty walking  Psychiatric: Not suicidal, no sleep disturbance, no anxiety or depression, no change in personality, no emotional problems  Endocrine: No complaints of proptosis, no hot flashes, no muscle weakness, no deepening of the voice, no feelings of weakness  Hematologic/Lymphatic: No complaints of swollen glands, no swollen glands in the neck, does not bleed easily, does not bruise easily  Past Medical History      The active problems and past medical history were reviewed and updated today  Surgical History      The surgical history was reviewed and updated today  Family History      The family history was reviewed and updated today  Social History   The social history was reviewed and updated today  The social history was reviewed and is unchanged  Current Meds    1  Atorvastatin Calcium 40 MG Oral Tablet; Take 1 tablet daily; Therapy: (Recorded:07Mar2017) to Recorded   2  ClomiPRAMINE HCl - 50 MG Oral Capsule; TAKE 1 CAPSULE 3 times daily; Therapy: 06LZM8902 to (Evaluate:74Cdh1123) Recorded   3  Hydrocodone-Acetaminophen  MG Oral Tablet; TAKE 1 TABLET EVERY 8 HOURS     AS NEEDED FOR PAIN;     Therapy: 14Apr2016 to (Evaluate:02Hks6266) Recorded   4  Ibuprofen 600 MG Oral Tablet; TAKE 1 TABLET 3 TIMES DAILY AS NEEDED; Therapy: 49VEL6699 to Recorded   5  KlonoPIN 0 5 MG Oral Tablet; TAKE 1 TABLET 3 times daily;      Therapy: 10LWE4202 to Recorded   6  Lactulose 10 GM/15ML Oral Solution; TAKE 15 ML Daily; Therapy: 25Apr2016-(Evaluate:29Mar2017) Recorded   7  LamoTRIgine 100 MG Oral Tablet; 3 tablets daily Recorded   8  Omeprazole 40 MG Oral Capsule Delayed Release; take 1 capsule daily; Therapy: 25Apr2016 to Recorded   9  SEROquel 300 MG Oral Tablet; TAKE 1 TABLET DAILY; Therapy: (21 ) to Recorded   10  SEROquel  MG Oral Tablet Extended Release 24 Hour; TAKE 1 TABLET DAILY; Therapy: 96DEO7008 to Recorded     The medication list was reviewed and updated today  Allergies   1  No Known Drug Allergies    Vitals   Vital Signs    Recorded: 20Dec2017 01:50PM   Heart Rate 071   Systolic 302   Diastolic 88   Height 5 ft 7 in   Weight 133 lb 8 0 oz   BMI Calculated 20 91   BSA Calculated 1 7     Physical Exam        Constitutional: General appearance: No acute distress, well appearing and well nourished  ENT: External ears and nose appear normal          Eyes: Anicteric sclerae  Neck: No bruit heard over either carotid  JVD:  No JVD present  Pulmonary: Respiratory effort: No increased work of breathing or signs of respiratory distress  -- Auscultation of lungs: Clear to auscultation  Cardiovascular: Auscultation of heart: Normal rate and rhythm, normal S1 and S2, without murmurs  Abdomen: Non-tender, no masses  Extremities: No cyanosis, clubbing or edema  Pulses: Dorsalis Pedis and Posterior Tibial pulses normal       Rash: No rash present  Neurologic: Non Focal          Psychiatric: Orientation to person, place, and time: Normal  -- and-- Mood and affect: Normal        Back: No CVA tenderness        Results/Data   (1) CBC/PLT/DIFF 19ZVM9876 07:16AM Mason Patton    Order Number: [id number]      Test Name Result Flag Reference   WBC COUNT 3 90 Thousand/uL L 4 31-10 16   RBC COUNT 3 67 Million/uL L 3 81-5 12   HEMOGLOBIN 11 2 g/dL L 11 5-15 4 HEMATOCRIT 37 4 %  34 8-46  1    fL H 82-98   MCH 30 5 pg  26 8-34 3   MCHC 29 9 g/dL L 31 4-37 4   RDW 13 7 %  11 6-15 1   MPV 9 9 fL  8 9-12 7   PLATELET COUNT 167 Thousands/uL  149-390   NEUTROPHILS RELATIVE PERCENT 61 %  43-75   LYMPHOCYTES RELATIVE PERCENT 28 %  14-44   MONOCYTES RELATIVE PERCENT 8 %  4-12   EOSINOPHILS RELATIVE PERCENT 2 %  0-6   BASOPHILS RELATIVE PERCENT 1 %  0-1   NEUTROPHILS ABSOLUTE COUNT 2 36 Thousands/? ??L  1 85-7 62   LYMPHOCYTES ABSOLUTE COUNT 1 10 Thousands/? ??L  0 60-4 47   MONOCYTES ABSOLUTE COUNT 0 31 Thousand/? ??L  0 17-1 22   EOSINOPHILS ABSOLUTE COUNT 0 08 Thousand/? ??L  0 00-0 61   BASOPHILS ABSOLUTE COUNT 0 05 Thousands/? ??L  0 00-0 10   - Patient Instructions: This bloodwork is non-fasting  Please drink two glasses of water morning of bloodwork  (1) COMPREHENSIVE METABOLIC PANEL 82AUT2712 57:32KZ Yareli Crow    Order Number: [id number]      Test Name Result Flag Reference   SODIUM 147 mmol/L H 136-145   POTASSIUM 3 2 mmol/L L 3 5-5 3   CHLORIDE 113 mmol/L H 100-108   CARBON DIOXIDE 23 mmol/L  21-32   ANION GAP (CALC) 11 mmol/L  4-13   BLOOD UREA NITROGEN 13 mg/dL  5-25   CREATININE 1 79 mg/dL H 0 60-1 30   Standardized to IDMS reference method   CALCIUM 8 5 mg/dL  8 3-10 1   BILI, TOTAL 0 30 mg/dL  0 20-1 00   ALK PHOSPHATAS 261 U/L H    ALT (SGPT) 31 U/L  12-78   Specimen collection should occur prior to Sulfasalazine administration due to the potential for falsely depressed results  AST(SGOT) 15 U/L  5-45   Specimen collection should occur prior to Sulfasalazine administration due to the potential for falsely depressed results     ALBUMIN 3 4 g/dL L 3 5-5 0   TOTAL PROTEIN 6 4 g/dL  6 4-8 2   eGFR 31 ml/min/1 73sq m     [facility] Disease Education Program recommendations are as follows:     GFR calculation is accurate only with a steady state creatinine     Chronic Kidney disease less than 60 ml/min/1 73 sq  meters     Kidney failure less than 15 ml/min/1 73 sq  meters  GLUCOSE FASTING 104 mg/dL H 65-99   Specimen collection should occur prior to Sulfasalazine administration due to the potential for falsely depressed results  Specimen collection should occur prior to Sulfapyridine administration due to the potential for falsely elevated results  (1) MAGNESIUM 87LPG0396 07:16AM Brenita Pale   TW Order Number: [id number]      Test Name Result Flag Reference   MAGNESIUM 2 1 mg/dL  1 6-2 6      (1) PHOSPHORUS 50EQJ2710 07:16AM Brenita Pale   TW Order Number: [id number]      Test Name Result Flag Reference   PHOSPHORUS 3 3 mg/dL  2 7-4 5      (1) PTH N-TERMINAL (INTACT) 03EWL4314 07:16AM Brenita Pale   TW Order Number: [id number]      Test Name Result Flag Reference   PARATHYROID HORMONE INTACT 131 3 pg/mL H 14 0-72 0      (1) URIC ACID 06TQL8410 07:16AM Brenita Pale   TW Order Number: [id number]      Test Name Result Flag Reference   URIC ACID 4 5 mg/dL  2 0-6 8   Specimen collection should occur prior to Metamizole administration due to the potential for falsely depressed results        (1) URINALYSIS WITH MICROSCOPIC 02Eil9561 07:16AM Brenita Pale   TW Order Number: [id number]      Test Name Result Flag Reference   COLOR Yellow     CLARITY Clear     PH UA 6 0  4 5-8 0   LEUKOCYTE ESTERASE UA Trace A Negative   NITRITE UA Negative  Negative   PROTEIN UA Negative mg/dl  Negative   GLUCOSE UA Negative mg/dl  Negative   KETONES UA Negative mg/dl  Negative   UROBILINOGEN UA 0 2 E U /dl  0 2, 1 0 E U /dl   BILIRUBIN UA Negative  Negative   BLOOD UA Negative  Negative   SPECIFIC GRAVITY UA <=1 005  1 003-1 030   WBC UA 1-2 /hpf A None Seen, 0-5, 5-55, 5-65   RBC UA None Seen /hpf  None Seen, 0-5   BACTERIA Occasional /hpf  None Seen, Occasional   EPITHELIAL CELLS Occasional /hpf  None Seen, Occasional      (1) VITAMIN D 25-HYDROXY 48BEH4392 07:16AM Brenita Pale   TW Order Number: [id number]      Test Name Result Flag Reference   VIT D 25-HYDROX 7 4 ng/mL L 30 0-100 0   This assay is a certified procedure of the CDC Vitamin D Standardization Certification Program (VDSCP)           Deficiency <20ng/ml      Insufficiency 20-30ng/ml      Sufficient  ng/ml           *Patients undergoing fluorescein dye angiography may retain small amounts of fluorescein in the body for 48-72 hours post procedure  Samples containing fluorescein can produce falsely elevated Vitamin D values  If the patient had this procedure, a specimen should be resubmitted post fluorescein clearance  * DXA BONE DENSITY SPINE HIP AND PELVIS 02YRU2781 11:30AM EPIC, Provider   Test ordered by: Deanna Beltran      Test Name Result Flag Reference   DXA BONE DENSITY SPINE HIP AND PELVIS (Report)     CENTRAL DXA SCAN           CLINICAL HISTORY:  56year old post-menopausal  female with history of anorexia and prior pituitary tumor  The patient does not exercise and does not take calcium or vitamin D supplements              TECHNIQUE: Bone densitometry was performed using a Hologic Horizon A bone densitometer  Regions of interest appear properly placed  There are no obvious fractures or other confounding variables which could limit the study  COMPARISON: None  RESULTS:       LUMBAR SPINE: L1-L4:      BMD 0 800 gm/cm2      T-score -2 2      Z-score -1 1           LEFT TOTAL HIP:      BMD 0 660 gm/cm2      T-score -2 3      Z-score -1 6           LEFT FEMORAL NECK:      BMD 0 593 gm/cm2      T-score -2 3      Z-score -1 2                            IMPRESSION:      1  Based on the [facility] classification, the T-score of -2 3 in both the left total hip and left femoral neck is consistent with low bone mineral density  2  Any secondary causes of low bone mineral density should be excluded prior to treatment, if clinically indicated        3  A daily intake of at least 1200 mg calcium and 800 to 1000 IU of Vitamin D, as well as weight bearing and muscle strengthening exercise, fall prevention and avoidance of tobacco and excessive alcohol intake as basic preventive measures are suggested  4  Repeat DXA in 18 - 24 months, on the same machine, as clinically indicated  The 10 year risk of hip fracture is 1 3%, with the 10 year risk of major osteoporotic fracture being 15%, as calculated by the [facility] fracture risk assessment tool (FRAX)  The current NOF guidelines recommend treating patients with FRAX 10 year risk score       of >3% for hip fracture and >20% for major osteoporotic fracture              WHO CLASSIFICATION:      Normal (a T-score of -1 0 or higher)      Low bone mineral density (a T-score of less than -1 0 but higher than -2 5)      Osteoporosis (a T-score of -2 5 or less)      Severe osteoporosis (a T-score of -2 5 or less with a fragility fracture)                            Workstation performed: [id number]           Signed by:      Yao Montesinos MD      12/11/17        Signatures    Electronically signed by : Jackie Hammer DO; Dec 20 2017  2:22PM EST                       (Author)

## 2018-01-01 DIAGNOSIS — E87.6 HYPOKALEMIA: ICD-10-CM

## 2018-01-15 VITALS
DIASTOLIC BLOOD PRESSURE: 74 MMHG | WEIGHT: 149 LBS | SYSTOLIC BLOOD PRESSURE: 124 MMHG | BODY MASS INDEX: 23.39 KG/M2 | HEIGHT: 67 IN

## 2018-01-15 NOTE — MISCELLANEOUS
Message   Recorded as Task   Date: 01/22/2016 08:09 AM, Created By: Giacomo Martines   Task Name: Miscellaneous   Assigned To: Mickie Romero   Regarding Patient: Kellie Perkins, Status: Active   CommentCorie Damon - 22 Jan 2016 8:09 AM     TASK CREATED  Pt chela w/ the svc to cx her appt w/ you on monday  She will c/b to r/s  René Sahu - 22 Jan 2016 8:20 AM     TASK REPLIED TO: Previously Assigned To René Sahu  Provider aware  Thank you  Active Problems    1  Fall down stairs (E880 9) (W10 8XXA)   2  Herniated nucleus pulposus, L5-S1 (722 10) (M51 27)   3  Myofascial pain on right side (729 1) (M79 1)   4  Neck pain (723 1) (M54 2)   5  Right lumbar radiculitis (724 4) (M54 16)   6  Right-sided low back pain with right-sided sciatica (724 3) (M54 41)    Current Meds   1  Atorvastatin Calcium 40 MG Oral Tablet; Therapy: (Recorded:23Nov2015) to Recorded   2  Clomid 50 MG TABS (ClomiPHENE Citrate); Therapy: (Recorded:23Nov2015) to Recorded   3  ClonazePAM 0 5 MG Oral Tablet; Therapy: (Recorded:23Nov2015) to Recorded   4  LamoTRIgine 100 MG Oral Tablet; Therapy: (Recorded:23Nov2015) to Recorded   5  Lyrica 75 MG Oral Capsule; Take 1 Pill at bed x 7 days, then 1 pill 2 x daily; Therapy: 06LGT6413 to (Evaluate:27Jan2016); Last Rx:76Hfp6909 Ordered   6  Omeprazole 40 MG CPCR; Therapy: (Recorded:23Nov2015) to Recorded   7  SEROquel 300 MG Oral Tablet (QUEtiapine Fumarate); Therapy: (Recorded:23Nov2015) to Recorded    Allergies    1   No Known Drug Allergies    Signatures   Electronically signed by : Helen Mullins, ; Jan 22 2016  8:25AM EST                       (Author)

## 2018-01-23 ENCOUNTER — APPOINTMENT (OUTPATIENT)
Dept: LAB | Facility: HOSPITAL | Age: 58
End: 2018-01-23
Attending: INTERNAL MEDICINE
Payer: MEDICARE

## 2018-01-23 ENCOUNTER — TRANSCRIBE ORDERS (OUTPATIENT)
Dept: ADMINISTRATIVE | Facility: HOSPITAL | Age: 58
End: 2018-01-23

## 2018-01-23 VITALS
SYSTOLIC BLOOD PRESSURE: 136 MMHG | WEIGHT: 133.5 LBS | HEIGHT: 67 IN | BODY MASS INDEX: 20.95 KG/M2 | DIASTOLIC BLOOD PRESSURE: 88 MMHG | HEART RATE: 100 BPM

## 2018-01-23 DIAGNOSIS — N18.30 CHRONIC KIDNEY DISEASE, STAGE III (MODERATE) (HCC): ICD-10-CM

## 2018-01-23 LAB
ALBUMIN SERPL BCP-MCNC: 3 G/DL (ref 3.5–5)
ALP SERPL-CCNC: 218 U/L (ref 46–116)
ALT SERPL W P-5'-P-CCNC: 37 U/L (ref 12–78)
ANION GAP SERPL CALCULATED.3IONS-SCNC: 10 MMOL/L (ref 4–13)
AST SERPL W P-5'-P-CCNC: 19 U/L (ref 5–45)
BILIRUB SERPL-MCNC: 0.2 MG/DL (ref 0.2–1)
BUN SERPL-MCNC: 24 MG/DL (ref 5–25)
CALCIUM SERPL-MCNC: 8.6 MG/DL (ref 8.3–10.1)
CHLORIDE SERPL-SCNC: 112 MMOL/L (ref 100–108)
CO2 SERPL-SCNC: 22 MMOL/L (ref 21–32)
CREAT SERPL-MCNC: 1.86 MG/DL (ref 0.6–1.3)
GFR SERPL CREATININE-BSD FRML MDRD: 30 ML/MIN/1.73SQ M
GLUCOSE P FAST SERPL-MCNC: 110 MG/DL (ref 65–99)
POTASSIUM SERPL-SCNC: 3.4 MMOL/L (ref 3.5–5.3)
PROT SERPL-MCNC: 6.4 G/DL (ref 6.4–8.2)
SODIUM SERPL-SCNC: 144 MMOL/L (ref 136–145)

## 2018-01-23 PROCEDURE — 80053 COMPREHEN METABOLIC PANEL: CPT

## 2018-01-23 PROCEDURE — 36415 COLL VENOUS BLD VENIPUNCTURE: CPT

## 2018-02-16 ENCOUNTER — HOSPITAL ENCOUNTER (INPATIENT)
Facility: HOSPITAL | Age: 58
LOS: 4 days | Discharge: HOME | DRG: 682 | End: 2018-02-20
Attending: EMERGENCY MEDICINE | Admitting: INTERNAL MEDICINE
Payer: MEDICARE

## 2018-02-16 ENCOUNTER — APPOINTMENT (EMERGENCY)
Dept: CT IMAGING | Facility: HOSPITAL | Age: 58
DRG: 682 | End: 2018-02-16
Payer: MEDICARE

## 2018-02-16 DIAGNOSIS — K86.89 DILATED PANCREATIC DUCT: ICD-10-CM

## 2018-02-16 DIAGNOSIS — K85.90 PANCREATITIS: ICD-10-CM

## 2018-02-16 DIAGNOSIS — E87.6 HYPOKALEMIA: Primary | ICD-10-CM

## 2018-02-16 PROBLEM — N18.9 ACUTE KIDNEY INJURY SUPERIMPOSED ON CHRONIC KIDNEY DISEASE (HCC): Status: RESOLVED | Noted: 2017-01-17 | Resolved: 2018-02-16

## 2018-02-16 PROBLEM — N17.9 ACUTE KIDNEY INJURY SUPERIMPOSED ON CHRONIC KIDNEY DISEASE (HCC): Status: RESOLVED | Noted: 2017-01-17 | Resolved: 2018-02-16

## 2018-02-16 LAB
ALBUMIN SERPL BCP-MCNC: 2.9 G/DL (ref 3.5–5)
ALP SERPL-CCNC: 198 U/L (ref 46–116)
ALT SERPL W P-5'-P-CCNC: 28 U/L (ref 12–78)
ANION GAP SERPL CALCULATED.3IONS-SCNC: 15 MMOL/L (ref 4–13)
AST SERPL W P-5'-P-CCNC: 21 U/L (ref 5–45)
BASOPHILS # BLD AUTO: 0.03 THOUSANDS/ΜL (ref 0–0.1)
BASOPHILS NFR BLD AUTO: 0 % (ref 0–1)
BILIRUB SERPL-MCNC: 0.2 MG/DL (ref 0.2–1)
BUN SERPL-MCNC: 29 MG/DL (ref 5–25)
CALCIUM SERPL-MCNC: 8.4 MG/DL (ref 8.3–10.1)
CHLORIDE SERPL-SCNC: 102 MMOL/L (ref 100–108)
CO2 SERPL-SCNC: 20 MMOL/L (ref 21–32)
CREAT SERPL-MCNC: 2.47 MG/DL (ref 0.6–1.3)
EOSINOPHIL # BLD AUTO: 0.13 THOUSAND/ΜL (ref 0–0.61)
EOSINOPHIL NFR BLD AUTO: 2 % (ref 0–6)
ERYTHROCYTE [DISTWIDTH] IN BLOOD BY AUTOMATED COUNT: 13.8 % (ref 11.6–15.1)
GFR SERPL CREATININE-BSD FRML MDRD: 21 ML/MIN/1.73SQ M
GLUCOSE SERPL-MCNC: 113 MG/DL (ref 65–140)
HCT VFR BLD AUTO: 33.7 % (ref 34.8–46.1)
HGB BLD-MCNC: 11.2 G/DL (ref 11.5–15.4)
LIPASE SERPL-CCNC: 1230 U/L (ref 73–393)
LYMPHOCYTES # BLD AUTO: 2.08 THOUSANDS/ΜL (ref 0.6–4.47)
LYMPHOCYTES NFR BLD AUTO: 24 % (ref 14–44)
MAGNESIUM SERPL-MCNC: 2.1 MG/DL (ref 1.6–2.6)
MCH RBC QN AUTO: 32.5 PG (ref 26.8–34.3)
MCHC RBC AUTO-ENTMCNC: 33.2 G/DL (ref 31.4–37.4)
MCV RBC AUTO: 98 FL (ref 82–98)
MONOCYTES # BLD AUTO: 0.74 THOUSAND/ΜL (ref 0.17–1.22)
MONOCYTES NFR BLD AUTO: 9 % (ref 4–12)
NEUTROPHILS # BLD AUTO: 5.56 THOUSANDS/ΜL (ref 1.85–7.62)
NEUTS SEG NFR BLD AUTO: 65 % (ref 43–75)
PLATELET # BLD AUTO: 393 THOUSANDS/UL (ref 149–390)
PMV BLD AUTO: 9.7 FL (ref 8.9–12.7)
POTASSIUM SERPL-SCNC: 2.5 MMOL/L (ref 3.5–5.3)
PROT SERPL-MCNC: 6.9 G/DL (ref 6.4–8.2)
RBC # BLD AUTO: 3.45 MILLION/UL (ref 3.81–5.12)
SODIUM SERPL-SCNC: 137 MMOL/L (ref 136–145)
WBC # BLD AUTO: 8.54 THOUSAND/UL (ref 4.31–10.16)

## 2018-02-16 PROCEDURE — 83690 ASSAY OF LIPASE: CPT | Performed by: EMERGENCY MEDICINE

## 2018-02-16 PROCEDURE — 74176 CT ABD & PELVIS W/O CONTRAST: CPT

## 2018-02-16 PROCEDURE — 96365 THER/PROPH/DIAG IV INF INIT: CPT

## 2018-02-16 PROCEDURE — 96366 THER/PROPH/DIAG IV INF ADDON: CPT

## 2018-02-16 PROCEDURE — 96361 HYDRATE IV INFUSION ADD-ON: CPT

## 2018-02-16 PROCEDURE — 85025 COMPLETE CBC W/AUTO DIFF WBC: CPT | Performed by: EMERGENCY MEDICINE

## 2018-02-16 PROCEDURE — 99223 1ST HOSP IP/OBS HIGH 75: CPT | Performed by: INTERNAL MEDICINE

## 2018-02-16 PROCEDURE — 93005 ELECTROCARDIOGRAM TRACING: CPT

## 2018-02-16 PROCEDURE — 36415 COLL VENOUS BLD VENIPUNCTURE: CPT | Performed by: EMERGENCY MEDICINE

## 2018-02-16 PROCEDURE — 80053 COMPREHEN METABOLIC PANEL: CPT | Performed by: EMERGENCY MEDICINE

## 2018-02-16 PROCEDURE — 96375 TX/PRO/DX INJ NEW DRUG ADDON: CPT

## 2018-02-16 PROCEDURE — 83735 ASSAY OF MAGNESIUM: CPT | Performed by: EMERGENCY MEDICINE

## 2018-02-16 RX ORDER — QUETIAPINE FUMARATE 300 MG/1
TABLET, FILM COATED ORAL
COMMUNITY
End: 2019-01-02 | Stop reason: SDUPTHER

## 2018-02-16 RX ORDER — SODIUM CHLORIDE 9 MG/ML
125 INJECTION, SOLUTION INTRAVENOUS CONTINUOUS
Status: DISCONTINUED | OUTPATIENT
Start: 2018-02-16 | End: 2018-02-17

## 2018-02-16 RX ORDER — ONDANSETRON 2 MG/ML
4 INJECTION INTRAMUSCULAR; INTRAVENOUS ONCE
Status: COMPLETED | OUTPATIENT
Start: 2018-02-16 | End: 2018-02-16

## 2018-02-16 RX ORDER — POTASSIUM CHLORIDE 20 MEQ/1
40 TABLET, EXTENDED RELEASE ORAL ONCE
Status: COMPLETED | OUTPATIENT
Start: 2018-02-16 | End: 2018-02-16

## 2018-02-16 RX ORDER — ACETAMINOPHEN 160 MG
TABLET,DISINTEGRATING ORAL DAILY
COMMUNITY
Start: 2017-12-20 | End: 2018-07-05

## 2018-02-16 RX ORDER — CLONAZEPAM 0.5 MG/1
1 TABLET ORAL 3 TIMES DAILY
Status: ON HOLD | COMMUNITY
Start: 2017-03-07 | End: 2018-02-17 | Stop reason: SDUPTHER

## 2018-02-16 RX ORDER — QUETIAPINE 400 MG/1
1 TABLET, FILM COATED, EXTENDED RELEASE ORAL DAILY
Status: ON HOLD | COMMUNITY
Start: 2016-06-19 | End: 2018-02-17 | Stop reason: SDUPTHER

## 2018-02-16 RX ORDER — ATORVASTATIN CALCIUM 40 MG/1
1 TABLET, FILM COATED ORAL DAILY
COMMUNITY
End: 2018-09-26 | Stop reason: SDUPTHER

## 2018-02-16 RX ORDER — CLOMIPRAMINE HYDROCHLORIDE 50 MG/1
1 CAPSULE ORAL 3 TIMES DAILY
Status: ON HOLD | COMMUNITY
Start: 2016-07-07 | End: 2018-02-17 | Stop reason: SDUPTHER

## 2018-02-16 RX ORDER — LAMOTRIGINE 100 MG/1
3 TABLET ORAL
COMMUNITY
End: 2018-02-20 | Stop reason: HOSPADM

## 2018-02-16 RX ORDER — POTASSIUM CHLORIDE 14.9 MG/ML
20 INJECTION INTRAVENOUS ONCE
Status: COMPLETED | OUTPATIENT
Start: 2018-02-16 | End: 2018-02-16

## 2018-02-16 RX ORDER — OMEPRAZOLE 40 MG/1
1 CAPSULE, DELAYED RELEASE ORAL DAILY
Status: ON HOLD | COMMUNITY
Start: 2016-04-25 | End: 2018-02-17 | Stop reason: SDUPTHER

## 2018-02-16 RX ORDER — ONDANSETRON 2 MG/ML
INJECTION INTRAMUSCULAR; INTRAVENOUS
Status: COMPLETED
Start: 2018-02-16 | End: 2018-02-16

## 2018-02-16 RX ORDER — POTASSIUM CHLORIDE 20 MEQ/1
1 TABLET, EXTENDED RELEASE ORAL 2 TIMES DAILY
COMMUNITY
Start: 2017-12-20 | End: 2018-11-13

## 2018-02-16 RX ORDER — HYDROCODONE BITARTRATE AND ACETAMINOPHEN 10; 325 MG/1; MG/1
1 TABLET ORAL EVERY 8 HOURS PRN
Status: ON HOLD | COMMUNITY
Start: 2016-04-14 | End: 2018-02-20

## 2018-02-16 RX ADMIN — SODIUM CHLORIDE 1000 ML: 0.9 INJECTION, SOLUTION INTRAVENOUS at 20:15

## 2018-02-16 RX ADMIN — ONDANSETRON 4 MG: 2 INJECTION INTRAMUSCULAR; INTRAVENOUS at 19:43

## 2018-02-16 RX ADMIN — POTASSIUM CHLORIDE 40 MEQ: 20 TABLET, EXTENDED RELEASE ORAL at 20:53

## 2018-02-16 RX ADMIN — SODIUM CHLORIDE 125 ML/HR: 0.9 INJECTION, SOLUTION INTRAVENOUS at 20:30

## 2018-02-16 RX ADMIN — POTASSIUM CHLORIDE 20 MEQ: 200 INJECTION, SOLUTION INTRAVENOUS at 20:52

## 2018-02-17 LAB
ALBUMIN SERPL BCP-MCNC: 2.3 G/DL (ref 3.5–5)
ALP SERPL-CCNC: 168 U/L (ref 46–116)
ALT SERPL W P-5'-P-CCNC: 21 U/L (ref 12–78)
AMMONIA PLAS-SCNC: 17 UMOL/L (ref 11–35)
ANION GAP SERPL CALCULATED.3IONS-SCNC: 12 MMOL/L (ref 4–13)
AST SERPL W P-5'-P-CCNC: 21 U/L (ref 5–45)
ATRIAL RATE: 98 BPM
BACTERIA UR QL AUTO: ABNORMAL /HPF
BILIRUB SERPL-MCNC: 0.2 MG/DL (ref 0.2–1)
BILIRUB UR QL STRIP: NEGATIVE
BUN SERPL-MCNC: 22 MG/DL (ref 5–25)
CALCIUM SERPL-MCNC: 8.4 MG/DL (ref 8.3–10.1)
CHLORIDE SERPL-SCNC: 118 MMOL/L (ref 100–108)
CHOLEST SERPL-MCNC: 176 MG/DL (ref 50–200)
CLARITY UR: CLEAR
CO2 SERPL-SCNC: 17 MMOL/L (ref 21–32)
COLOR UR: YELLOW
CREAT SERPL-MCNC: 2.2 MG/DL (ref 0.6–1.3)
ERYTHROCYTE [DISTWIDTH] IN BLOOD BY AUTOMATED COUNT: 13.8 % (ref 11.6–15.1)
GFR SERPL CREATININE-BSD FRML MDRD: 25 ML/MIN/1.73SQ M
GLUCOSE SERPL-MCNC: 112 MG/DL (ref 65–140)
GLUCOSE UR STRIP-MCNC: NEGATIVE MG/DL
HCT VFR BLD AUTO: 31.5 % (ref 34.8–46.1)
HDLC SERPL-MCNC: 68 MG/DL (ref 40–60)
HGB BLD-MCNC: 10 G/DL (ref 11.5–15.4)
HGB UR QL STRIP.AUTO: ABNORMAL
KETONES UR STRIP-MCNC: NEGATIVE MG/DL
LDLC SERPL CALC-MCNC: 83 MG/DL (ref 0–100)
LEUKOCYTE ESTERASE UR QL STRIP: NEGATIVE
LIPASE SERPL-CCNC: 243 U/L (ref 73–393)
MAGNESIUM SERPL-MCNC: 2.5 MG/DL (ref 1.6–2.6)
MCH RBC QN AUTO: 31.4 PG (ref 26.8–34.3)
MCHC RBC AUTO-ENTMCNC: 31.7 G/DL (ref 31.4–37.4)
MCV RBC AUTO: 99 FL (ref 82–98)
NITRITE UR QL STRIP: NEGATIVE
NON-SQ EPI CELLS URNS QL MICRO: ABNORMAL /HPF
P AXIS: 86 DEGREES
PH UR STRIP.AUTO: 6.5 [PH] (ref 4.5–8)
PHOSPHATE SERPL-MCNC: 3.7 MG/DL (ref 2.7–4.5)
PLATELET # BLD AUTO: 363 THOUSANDS/UL (ref 149–390)
PMV BLD AUTO: 9.3 FL (ref 8.9–12.7)
POTASSIUM SERPL-SCNC: 3.3 MMOL/L (ref 3.5–5.3)
PR INTERVAL: 146 MS
PROT SERPL-MCNC: 5.6 G/DL (ref 6.4–8.2)
PROT UR STRIP-MCNC: NEGATIVE MG/DL
QRS AXIS: 109 DEGREES
QRSD INTERVAL: 112 MS
QT INTERVAL: 458 MS
QTC INTERVAL: 584 MS
RBC # BLD AUTO: 3.18 MILLION/UL (ref 3.81–5.12)
RBC #/AREA URNS AUTO: ABNORMAL /HPF
SODIUM SERPL-SCNC: 147 MMOL/L (ref 136–145)
SP GR UR STRIP.AUTO: <=1.005 (ref 1–1.03)
T WAVE AXIS: 68 DEGREES
TRIGL SERPL-MCNC: 124 MG/DL
UROBILINOGEN UR QL STRIP.AUTO: 0.2 E.U./DL
VENTRICULAR RATE: 98 BPM
WBC # BLD AUTO: 6.02 THOUSAND/UL (ref 4.31–10.16)
WBC #/AREA URNS AUTO: ABNORMAL /HPF

## 2018-02-17 PROCEDURE — 93010 ELECTROCARDIOGRAM REPORT: CPT | Performed by: INTERNAL MEDICINE

## 2018-02-17 PROCEDURE — 80061 LIPID PANEL: CPT | Performed by: INTERNAL MEDICINE

## 2018-02-17 PROCEDURE — 97162 PT EVAL MOD COMPLEX 30 MIN: CPT

## 2018-02-17 PROCEDURE — G8979 MOBILITY GOAL STATUS: HCPCS

## 2018-02-17 PROCEDURE — 83735 ASSAY OF MAGNESIUM: CPT | Performed by: INTERNAL MEDICINE

## 2018-02-17 PROCEDURE — 80053 COMPREHEN METABOLIC PANEL: CPT | Performed by: INTERNAL MEDICINE

## 2018-02-17 PROCEDURE — 83690 ASSAY OF LIPASE: CPT | Performed by: INTERNAL MEDICINE

## 2018-02-17 PROCEDURE — 99232 SBSQ HOSP IP/OBS MODERATE 35: CPT | Performed by: INTERNAL MEDICINE

## 2018-02-17 PROCEDURE — 81001 URINALYSIS AUTO W/SCOPE: CPT | Performed by: INTERNAL MEDICINE

## 2018-02-17 PROCEDURE — G8980 MOBILITY D/C STATUS: HCPCS

## 2018-02-17 PROCEDURE — 82140 ASSAY OF AMMONIA: CPT | Performed by: INTERNAL MEDICINE

## 2018-02-17 PROCEDURE — G8978 MOBILITY CURRENT STATUS: HCPCS

## 2018-02-17 PROCEDURE — 84100 ASSAY OF PHOSPHORUS: CPT | Performed by: INTERNAL MEDICINE

## 2018-02-17 PROCEDURE — 99285 EMERGENCY DEPT VISIT HI MDM: CPT

## 2018-02-17 PROCEDURE — 85027 COMPLETE CBC AUTOMATED: CPT | Performed by: INTERNAL MEDICINE

## 2018-02-17 RX ORDER — PANTOPRAZOLE SODIUM 40 MG/1
40 TABLET, DELAYED RELEASE ORAL
Status: DISCONTINUED | OUTPATIENT
Start: 2018-02-17 | End: 2018-02-20 | Stop reason: HOSPADM

## 2018-02-17 RX ORDER — HYDROCODONE BITARTRATE AND ACETAMINOPHEN 5; 325 MG/1; MG/1
1 TABLET ORAL EVERY 6 HOURS PRN
Status: DISCONTINUED | OUTPATIENT
Start: 2018-02-17 | End: 2018-02-20 | Stop reason: HOSPADM

## 2018-02-17 RX ORDER — TRAMADOL HYDROCHLORIDE 50 MG/1
50 TABLET ORAL EVERY 8 HOURS PRN
Status: DISCONTINUED | OUTPATIENT
Start: 2018-02-17 | End: 2018-02-20 | Stop reason: HOSPADM

## 2018-02-17 RX ORDER — POTASSIUM CHLORIDE AND SODIUM CHLORIDE 900; 300 MG/100ML; MG/100ML
75 INJECTION, SOLUTION INTRAVENOUS CONTINUOUS
Status: DISCONTINUED | OUTPATIENT
Start: 2018-02-17 | End: 2018-02-19

## 2018-02-17 RX ORDER — CLONAZEPAM 0.5 MG/1
0.5 TABLET ORAL 3 TIMES DAILY
Status: DISCONTINUED | OUTPATIENT
Start: 2018-02-17 | End: 2018-02-20 | Stop reason: HOSPADM

## 2018-02-17 RX ORDER — POTASSIUM CHLORIDE 20 MEQ/1
40 TABLET, EXTENDED RELEASE ORAL EVERY 4 HOURS
Status: COMPLETED | OUTPATIENT
Start: 2018-02-17 | End: 2018-02-17

## 2018-02-17 RX ORDER — ATORVASTATIN CALCIUM 40 MG/1
40 TABLET, FILM COATED ORAL
Status: DISCONTINUED | OUTPATIENT
Start: 2018-02-17 | End: 2018-02-20 | Stop reason: HOSPADM

## 2018-02-17 RX ORDER — LAMOTRIGINE 100 MG/1
100 TABLET ORAL 2 TIMES DAILY
Status: DISCONTINUED | OUTPATIENT
Start: 2018-02-17 | End: 2018-02-20 | Stop reason: HOSPADM

## 2018-02-17 RX ORDER — ONDANSETRON 2 MG/ML
4 INJECTION INTRAMUSCULAR; INTRAVENOUS EVERY 6 HOURS PRN
Status: DISCONTINUED | OUTPATIENT
Start: 2018-02-17 | End: 2018-02-20 | Stop reason: HOSPADM

## 2018-02-17 RX ORDER — CLOMIPRAMINE HYDROCHLORIDE 25 MG/1
50 CAPSULE ORAL 3 TIMES DAILY
Status: DISCONTINUED | OUTPATIENT
Start: 2018-02-17 | End: 2018-02-17 | Stop reason: RX

## 2018-02-17 RX ORDER — POTASSIUM CHLORIDE 20 MEQ/1
20 TABLET, EXTENDED RELEASE ORAL 2 TIMES DAILY
Status: DISCONTINUED | OUTPATIENT
Start: 2018-02-17 | End: 2018-02-19

## 2018-02-17 RX ORDER — HEPARIN SODIUM 5000 [USP'U]/ML
5000 INJECTION, SOLUTION INTRAVENOUS; SUBCUTANEOUS EVERY 8 HOURS SCHEDULED
Status: DISCONTINUED | OUTPATIENT
Start: 2018-02-17 | End: 2018-02-20 | Stop reason: HOSPADM

## 2018-02-17 RX ORDER — QUETIAPINE FUMARATE 200 MG/1
200 TABLET, FILM COATED ORAL
Status: DISCONTINUED | OUTPATIENT
Start: 2018-02-17 | End: 2018-02-20 | Stop reason: HOSPADM

## 2018-02-17 RX ORDER — QUETIAPINE 400 MG/1
400 TABLET, FILM COATED, EXTENDED RELEASE ORAL DAILY
Status: DISCONTINUED | OUTPATIENT
Start: 2018-02-17 | End: 2018-02-20 | Stop reason: HOSPADM

## 2018-02-17 RX ADMIN — HEPARIN SODIUM 5000 UNITS: 5000 INJECTION, SOLUTION INTRAVENOUS; SUBCUTANEOUS at 21:46

## 2018-02-17 RX ADMIN — CLONAZEPAM 0.5 MG: 0.5 TABLET ORAL at 09:57

## 2018-02-17 RX ADMIN — SODIUM CHLORIDE AND POTASSIUM CHLORIDE 125 ML/HR: 9; 2.98 INJECTION, SOLUTION INTRAVENOUS at 17:21

## 2018-02-17 RX ADMIN — POTASSIUM CHLORIDE 20 MEQ: 1500 TABLET, EXTENDED RELEASE ORAL at 17:23

## 2018-02-17 RX ADMIN — SODIUM CHLORIDE AND POTASSIUM CHLORIDE 125 ML/HR: 9; 2.98 INJECTION, SOLUTION INTRAVENOUS at 01:14

## 2018-02-17 RX ADMIN — ATORVASTATIN CALCIUM 40 MG: 40 TABLET, FILM COATED ORAL at 17:21

## 2018-02-17 RX ADMIN — LAMOTRIGINE 100 MG: 100 TABLET ORAL at 17:21

## 2018-02-17 RX ADMIN — HEPARIN SODIUM 5000 UNITS: 5000 INJECTION, SOLUTION INTRAVENOUS; SUBCUTANEOUS at 01:50

## 2018-02-17 RX ADMIN — QUETIAPINE 400 MG: 400 TABLET, EXTENDED RELEASE ORAL at 09:59

## 2018-02-17 RX ADMIN — POTASSIUM CHLORIDE 40 MEQ: 20 TABLET, EXTENDED RELEASE ORAL at 06:02

## 2018-02-17 RX ADMIN — SODIUM CHLORIDE AND POTASSIUM CHLORIDE 125 ML/HR: 9; 2.98 INJECTION, SOLUTION INTRAVENOUS at 10:02

## 2018-02-17 RX ADMIN — HEPARIN SODIUM 5000 UNITS: 5000 INJECTION, SOLUTION INTRAVENOUS; SUBCUTANEOUS at 06:02

## 2018-02-17 RX ADMIN — HYDROCODONE BITARTRATE AND ACETAMINOPHEN 1 TABLET: 5; 325 TABLET ORAL at 21:47

## 2018-02-17 RX ADMIN — HYDROCODONE BITARTRATE AND ACETAMINOPHEN 1 TABLET: 5; 325 TABLET ORAL at 06:35

## 2018-02-17 RX ADMIN — CLOMIPRAMINE HYDROCHLORIDE 50 MG: 25 CAPSULE ORAL at 09:58

## 2018-02-17 RX ADMIN — QUETIAPINE 200 MG: 200 TABLET ORAL at 21:47

## 2018-02-17 RX ADMIN — HEPARIN SODIUM 5000 UNITS: 5000 INJECTION, SOLUTION INTRAVENOUS; SUBCUTANEOUS at 13:12

## 2018-02-17 RX ADMIN — CLONAZEPAM 0.5 MG: 0.5 TABLET ORAL at 01:08

## 2018-02-17 RX ADMIN — LAMOTRIGINE 100 MG: 100 TABLET ORAL at 09:57

## 2018-02-17 RX ADMIN — POTASSIUM CHLORIDE 40 MEQ: 20 TABLET, EXTENDED RELEASE ORAL at 01:08

## 2018-02-17 RX ADMIN — HYDROCODONE BITARTRATE AND ACETAMINOPHEN 1 TABLET: 5; 325 TABLET ORAL at 13:11

## 2018-02-17 RX ADMIN — CLONAZEPAM 0.5 MG: 0.5 TABLET ORAL at 17:22

## 2018-02-17 RX ADMIN — QUETIAPINE 200 MG: 200 TABLET ORAL at 01:08

## 2018-02-17 RX ADMIN — PANTOPRAZOLE SODIUM 40 MG: 40 TABLET, DELAYED RELEASE ORAL at 06:02

## 2018-02-17 RX ADMIN — CLONAZEPAM 0.5 MG: 0.5 TABLET ORAL at 21:47

## 2018-02-17 NOTE — RESPIRATORY THERAPY NOTE
RT Protocol Note  Kacey Marquez 56 y o  female MRN: [id number]  Unit/Bed#: [address] [place] [address] 209-01 Encounter: [id number]    Assessment    Principal Problem:    Pancreatitis  Active Problems:    CAM (acute kidney injury) ([facility] [address] )    Hypokalemia    Bipolar 2 disorder ([facility] )    Hypercholesteremia    Chronic kidney disease, stage 3      Home Pulmonary Medications:  Pt has no pulmonary meds    Past Medical History:   Diagnosis Date    Ankle sprain     left    Bipolar 2 disorder (HCC)     Chronic back pain     Hypercholesteremia     Hyperlipidemia     Panic attacks      Social History     Social History    Marital status:      Spouse name: N/A    Number of children: N/A    Years of education: N/A     Occupational History    social security      Social History Main Topics    Smoking status: Never Smoker    Smokeless tobacco: Never Used    Alcohol use No    Drug use: No    Sexual activity: No     Other Topics Concern    None     Social History Narrative    None       Subjective         Objective    Physical Exam:   Assessment Type: (P) Assess only  Bilateral Breath Sounds: (P) Clear  Cough: (P) Non-productive    Vitals:  Blood pressure 133/69, pulse 92, temperature 98 9 °F (37 2 °C), temperature source Oral, resp  rate 20, height 5' 7" (1 702 m), weight 65 1 kg (143 lb 8 3 oz), SpO2 99 %, not currently breastfeeding  Imaging and other studies: I have personally reviewed pertinent reports  Plan    Respiratory Plan: (P) No distress/Pulmonary history (therapist is going to [place] resp   protocol)

## 2018-02-17 NOTE — CONSULTS
Consultation - GI   Dustin Mask 56 y o  female MRN: [id number]  Unit/Bed#: [address] [place] [address] [id number] Encounter: [id number]    Consults    ASSESSMENT:  57F with chronic pains on narcotics, h/o Biopolar presenting with dizziness/abd pain/n/v  Lipase 1230 @ adm, now 243  LFTs only w mildly elev alk phos (normal ast/alt/TB)  Noncon CT showing filling defect @ ampulla and dilated panc duct @ 7mm w normal biliary tree  1  Acute Pancreatitis - etiologies include gallstones vs ampullary/panc mass vs med induced? Normal triglycerides  No h/o ETOH  2  Hypokalemia and CAM    PLAN:  - Abd Pain improved - can try some sips of clears and slowly adv as tolerated  - Pain control and Antiemetics as needed  - Cont IVF  - Check MRCP  - Follow LFTs and lipase  - Plan on ERCP once acute pancreatitis improved    Chief Complaint   Patient presents with    Vomiting     Patient presents to the ED with vomitting starting tonght  States she has chronic hypokalemia and hasnt taken her potassium since 2/5       HPI:   This is a 56 y  o female who GI is consulted for pancreatitis  Patient states she came to the ER yesterday with some lightheadedness and dizziness  She states she's had some epigastric abd pain for the past few days but worse yesterday  Nonradiating  Vomiting of her dinner twice  No blood in the emesis  N/V improved now but she has been NPO  Abd pain is worsened with movement  No F/C  Never had similar pains in past  She continues to mention that she has not had her potassium pills for 10 days which often makes her feel dizzy  Denies any ETOH use       Past Medical History:   Diagnosis Date    Ankle sprain     left    Bipolar 2 disorder (HCC)     Chronic back pain     Hypercholesteremia     Hyperlipidemia     Panic attacks        Past Surgical History:   Procedure Laterality Date    TUBAL LIGATION Bilateral 1997       Prescriptions Prior to Admission   Medication    Cholecalciferol (VITAMIN D3) 2000 units capsule    HYDROcodone-acetaminophen (NORCO)  mg per tablet    lubiprostone (AMITIZA) 24 mcg capsule    potassium chloride (KLOR-CON M20) 20 mEq tablet    atorvastatin (LIPITOR) 40 mg tablet    clomiPRAMINE (ANAFRANIL) 50 mg capsule    clonazePAM (KLONOPIN) 0 5 mg tablet    lamoTRIgine (LaMICtal) 100 mg tablet    omeprazole (PriLOSEC) 40 MG capsule    QUEtiapine (SEROQUEL XR) 400 mg 24 hr tablet    QUEtiapine (SEROQUEL) 300 mg tablet       No Known Allergies    Social History     Family History   Problem Relation Age of Onset    Bipolar disorder Mother     Heart disease Father     Hypertension Father        Review of Systems:  General ROS: negative for - chills, fatigue, fever, night sweats or weight loss  Psychological ROS: negative for - anxiety or depression  ENT ROS: negative for - headaches or sore throat  Hematological and Lymphatic ROS: negative for - bleeding problems or bruising  Endocrine ROS: negative for - malaise/lethargy, palpitations, polydipsia/polyuria or temperature intolerance  Respiratory ROS: no cough, shortness of breath, or wheezing  negative for - orthopnea, shortness of breath, sputum changes, stridor or wheezing  Cardiovascular ROS: negative for - chest pain, dyspnea on exertion, edema, irregular heartbeat or palpitations  Gastrointestinal ROS: negative for - appetite loss, blood in stools, change in bowel habits, change in stools, diarrhea, gas/bloating, heartburn, hematemesis, melena, stool incontinence or swallowing difficulty/pain   + for abd pain, N/V/constipation  Genito-Urinary ROS: negative for - hematuria, incontinence or urinary frequency/urgency  Musculoskeletal ROS: negative for - joint pain or muscular weakness  Neurological ROS: + for dizziness and falls    /60 (BP Location: Right arm)   Pulse 91   Temp 98 1 °F (36 7 °C) (Oral)   Resp 18   Ht 5' 7" (1 702 m)   Wt 68 7 kg (151 lb 7 3 oz)   LMP  (LMP Unknown)   SpO2 97%   BMI 23 72 kg/m² PHYSICALEXAM:  General appearance: alert, appears stated age and cooperative  HEENT: Normocephalic, w/o obvious abnormality, atraumatic, PERRL, EOM's intact  Throat: lips, mucosa, and tongue normal; teeth and gums normal  Neck: no adenopathy, no JVD, supple, symmetrical, trachea midline  Lungs: clear to auscultation bilaterally, No wheeze/rales  Heart: regular rate and rhythm, S1, S2 normal, no murmur  Abdomen: soft, mildly TTP in the epig region; bowel sounds normal; no masses,  no organomegaly  Extremities: extremities normal, atraumatic, no cyanosis or edema  Skin: Skin color, texture, turgor normal  No rashes or lesions  Neurologic: Grossly normal      Lab Results   Component Value Date    GLUCOSE 112 02/17/2018    CALCIUM 8 4 02/17/2018     (H) 02/17/2018    K 3 3 (L) 02/17/2018    CO2 17 (L) 02/17/2018     (H) 02/17/2018    BUN 22 02/17/2018    CREATININE 2 20 (H) 02/17/2018     Lab Results   Component Value Date    WBC 6 02 02/17/2018    HGB 10 0 (L) 02/17/2018    HCT 31 5 (L) 02/17/2018    MCV 99 (H) 02/17/2018     02/17/2018     Lab Results   Component Value Date    ALT 21 02/17/2018    AST 21 02/17/2018    ALKPHOS 168 (H) 02/17/2018    BILITOT 0 20 02/17/2018     No results found for: INR  No components found for: AMYLJKJJJASE  Lab Results   Component Value Date    LIPASE 243 02/17/2018     Lab Results   Component Value Date    IRON 44 (L) 11/10/2017    TIBC 164 (L) 11/10/2017    FERRITIN 50 11/10/2017

## 2018-02-17 NOTE — H&P
History and Physical  Ishaan Staples 56 y o  female MRN: [id number]  Unit/Bed#: ED 05 Encounter: [id number]    Admitting Diagnosis:   Principal Problem:    Pancreatitis  Active Problems:    CAM (acute kidney injury) ([facility] )    Hypokalemia    Bipolar 2 disorder ([place] [address] )    Hypercholesteremia    Chronic kidney disease, stage 3  Resolved Problems:    * No resolved hospital problems  *      Plan:  Admit to med surgical floor on telemetry  · Acute pancreatitis  IV fluids  NPO  Will order right upper quadrant ultrasound  GI input  Will order triglycerides in a m  Pain management  · Hypokalemia  Potassium supplementation  · Acute kidney injury-POA  · Chronic kidney disease stage 3  Avoid nephrotoxins medications/hypotension  IV fluids  Follow-up renal function  · Hyperlipidemia-on Lipitor  · Bipolar disorder-on Seroquel, Lamictal  · DVT prophylaxis  · Discussed with patient in detail  · Anticipated length of stay greater than 2 midnights  Chief Complaint   Patient presents with    Vomiting     Patient presents to the ED with vomitting starting tonght  States she has chronic hypokalemia and hasnt taken her potassium since 2/5        HPI:  Ishaan Staples is a 56 y o  female who presented to the emergency department with complain of generalized weakness  Patient states she has chronic hypokalemia and has not been taking potassium for past 2 weeks  Patient takes laxatives MiraLax and lactulose for chronic constipation  She started having generalized weakness and was losing her balance since morning  She also complained of epigastric/right upper quadrant abdominal pain for the past 2 days  Abdominal pain is 7/10 in intensity, sharp, constant associated with nausea and she also had vomiting in the ER  Patient was found to have elevated lipase and CT abdomen pelvis was done which showed moderate fecal retention  Pancreatic duct measures 7 mm which may reflect patient's reported pancreatitis    Patient denies having any fever, chills, chest pain, palpitations or urinary complaints      Historical Information   Past Medical History:   Diagnosis Date    Ankle sprain     left    Bipolar 2 disorder (HCC)     Chronic back pain     Hypercholesteremia     Panic attacks      Past Surgical History:   Procedure Laterality Date    TUBAL LIGATION Bilateral 1997     Social History   History   Alcohol Use No     History   Drug Use No     History   Smoking Status    Never Smoker   Smokeless Tobacco    Never Used     Family History   Problem Relation Age of Onset    Bipolar disorder Mother     Heart disease Father     Hypertension Father        Meds/Allergies   No Known Allergies    Meds:    Current Facility-Administered Medications:     sodium chloride 0 9 % infusion, 125 mL/hr, Intravenous, Continuous, Bong Medina DO, Stopped at 02/16/18 2300    Current Outpatient Prescriptions:     Cholecalciferol (VITAMIN D3) 2000 units capsule, Take by mouth, Disp: , Rfl:     clomiPRAMINE (ANAFRANIL) 50 mg capsule, Take 1 capsule by mouth 3 (three) times a day, Disp: , Rfl:     clonazePAM (KLONOPIN) 0 5 mg tablet, Take 1 tablet by mouth 3 (three) times a day, Disp: , Rfl:     HYDROcodone-acetaminophen (NORCO)  mg per tablet, Take 1 tablet by mouth every 8 (eight) hours as needed, Disp: , Rfl:     omeprazole (PriLOSEC) 40 MG capsule, Take 1 capsule by mouth daily, Disp: , Rfl:     potassium chloride (KLOR-CON M20) 20 mEq tablet, Take 1 tablet by mouth 2 (two) times a day, Disp: , Rfl:     QUEtiapine (SEROQUEL XR) 400 mg 24 hr tablet, Take 1 tablet by mouth daily, Disp: , Rfl:     atorvastatin (LIPITOR) 40 mg tablet, Take 1 tablet by mouth daily, Disp: , Rfl:     clomiPRAMINE (ANAFRANIL) 50 mg capsule, Take by mouth, Disp: , Rfl:     clonazePAM (KLONOPIN) 0 5 mg tablet, Take by mouth 3 (three) times a day  , Disp: , Rfl:     Hydrocodone-Acetaminophen (LORCET 10/650 PO), Take by mouth 3 (three) times a day, Disp: , Rfl:     lamoTRIgine (LaMICtal) 100 mg tablet, Take by mouth 3 (three) times a day  , Disp: , Rfl:     lamoTRIgine (LaMICtal) 100 mg tablet, Take 3 tablets by mouth daily, Disp: , Rfl:     omeprazole (PriLOSEC) 40 MG capsule, Take by mouth, Disp: , Rfl:     QUEtiapine (SEROQUEL XR) 400 mg 24 hr tablet, Take by mouth daily at bedtime  , Disp: , Rfl:     QUEtiapine (SEROQUEL) 300 mg tablet, Take by mouth daily at bedtime  , Disp: , Rfl:     QUEtiapine (SEROQUEL) 300 mg tablet, Take 1 tablet by mouth daily, Disp: , Rfl:       (Not in a hospital admission)      Review of Systems   Constitutional: Positive for activity change  Generalized weakness   HENT: Negative  Eyes: Negative  Respiratory: Negative  Cardiovascular: Negative  Gastrointestinal: Positive for abdominal pain, nausea and vomiting  Endocrine: Negative  Genitourinary: Negative  Musculoskeletal: Positive for gait problem  Skin: Negative  Allergic/Immunologic: Negative  Hematological: Negative  Psychiatric/Behavioral: Negative  Current Vitals:   Blood Pressure: 126/75 (02/16/18 2230)  Pulse: 93 (02/16/18 2230)  Temperature: 98 3 °F (36 8 °C) (02/16/18 1955)  Temp Source: Tympanic (02/16/18 1955)  Respirations: (!) 9 (02/16/18 2230)  Height: 5' 7" (170 2 cm) (02/16/18 1939)  Weight - Scale: 62 1 kg (137 lb) (02/16/18 1939)  SpO2: 97 % (02/16/18 2230)  SPO2 RA Rest    Flowsheet Row ED from 2/16/2018 in [facility]   SpO2  97 %   SpO2 Activity  At Rest   O2 Device  None (Room air)   O2 Flow Rate  No data          Intake/Output Summary (Last 24 hours) at 02/16/18 2316  Last data filed at 02/16/18 2300   Gross per 24 hour   Intake             2100 ml   Output                0 ml   Net             2100 ml     Body mass index is 21 46 kg/m²  Physical Exam   Constitutional: She is oriented to person, place, and time  She appears well-developed and well-nourished   No distress  HENT:   Head: Normocephalic and atraumatic  Nose: Nose normal    Eyes: Conjunctivae and EOM are normal  Pupils are equal, round, and reactive to light  No scleral icterus  Neck: Normal range of motion  Neck supple  No JVD present  No tracheal deviation present  Cardiovascular: Normal rate, regular rhythm, normal heart sounds and intact distal pulses  Pulmonary/Chest: Effort normal and breath sounds normal  She has no wheezes  She has no rales  Abdominal: Soft  Bowel sounds are normal  There is no tenderness  There is no rebound and no guarding  Musculoskeletal: Normal range of motion  She exhibits no edema or deformity  Neurological: She is alert and oriented to person, place, and time  No cranial nerve deficit  Coordination normal    No focal deficits   Skin: Skin is warm  No rash noted  No erythema  Psychiatric: She has a normal mood and affect  Thought content normal    Nursing note and vitals reviewed        Lab Results:   CBC:   Lab Results   Component Value Date    WBC 8 54 02/16/2018    HGB 11 2 (L) 02/16/2018    HCT 33 7 (L) 02/16/2018    MCV 98 02/16/2018     (H) 02/16/2018    MCH 32 5 02/16/2018    MCHC 33 2 02/16/2018    RDW 13 8 02/16/2018    MPV 9 7 02/16/2018     CMP:  Lab Results   Component Value Date     02/16/2018     02/16/2018    CO2 20 (L) 02/16/2018    ANIONGAP 15 (H) 02/16/2018    BUN 29 (H) 02/16/2018    CREATININE 2 47 (H) 02/16/2018    GLUCOSE 113 02/16/2018    CALCIUM 8 4 02/16/2018    CALCIUM 9 6 02/27/2017    AST 21 02/16/2018    ALT 28 02/16/2018    ALKPHOS 198 (H) 02/16/2018    PROT 6 9 02/16/2018    BILITOT 0 20 02/16/2018    EGFR 21 02/16/2018     No results found for: TROPONINI, CKMB, CKTOTAL  Coagulation: No results found for: PT, INR, APTT Urinalysis:  Lab Results   Component Value Date    COLORU Yellow 12/19/2017    CLARITYU Clear 12/19/2017    SPECGRAV <=1 005 12/19/2017    PHUR 6 0 12/19/2017    LEUKOCYTESUR Trace (A) 12/19/2017 NITRITE Negative 12/19/2017    PROTEINUA Negative 12/19/2017    GLUCOSEU Negative 12/19/2017    KETONESU Negative 12/19/2017    BILIRUBINUR Negative 12/19/2017    BLOODU Negative 12/19/2017      Amylase: No results found for: AMYLASE  Lipase:   Lab Results   Component Value Date    LIPASE 1,230 (H) 02/16/2018        Imaging: No results found  EKG, Pathology, and Other Studies: I have personally reviewed the results    VTE Pharmacologic Prophylaxis: Heparin    Code Status: Level 1 - Full Code    "This note has been constructed using a voice recognition system"      Shaunna Calhoun MD  2/16/2018, 11:16 PM

## 2018-02-17 NOTE — PHYSICAL THERAPY NOTE
PT eval   02/17/18 1055   Note Type   Note type Eval only   Pain Assessment   Pain Assessment 0-10   Pain Score 5   Pain Location Abdomen   Pain Orientation Right; Anterior   Home Living   Type of [address] (no AD at home)   Prior Function   Level of [place] Independent with ADLs and functional mobility   Lives With [facility] Help From Family   ADL Assistance Independent   IADLs Independent   Falls in the last 6 months 1 to 4   Vocational On disability   Comments pt fell at home feel ok now amb back form BR with iv pole   Restrictions/Precautions   Weight Bearing Precautions Per Order No   Other Precautions Multiple lines;Telemetry   General   Additional Pertinent History ADm with vomitting abdom paion= pancreatitis   Family/Caregiver Present No   Cognition   Overall Cognitive Status WFL   Arousal/Participation Alert   Orientation Level Oriented X4   Memory Within functional limits   Following Commands Follows all commands and directions without difficulty   Comments ond at home   bipolar, on disability,  has dog and ind all mobility pta   RUE Assessment   RUE Assessment WFL   LUE Assessment   LUE Assessment WFL   RLE Assessment   RLE Assessment WFL   LLE Assessment   LLE Assessment WFL   Coordination   Movements are Fluid and Coordinated 1   Bed Mobility   Rolling R 7  Independent   Rolling L 7  Independent   Supine to Sit 7  Independent   Transfers   Sit to Stand 7  Independent   Stand to Sit 7  Independent   Stand pivot 7  Independent   Ambulation/Elevation   Gait pattern WNL   Gait Assistance 7  Independent   Assistive Device None  (able to push iv pole)   Distance 25'   Balance   Static Sitting Normal   Dynamic Sitting Normal   Static Standing Normal   Dynamic Standing Normal   Ambulatory Normal   Endurance Deficit   Endurance Deficit No   Activity Tolerance   Activity Tolerance Patient tolerated treatment well   Nurse Made Aware yes   Assessment   Prognosis Good   Assessment PT adm with pancreatitis, vomitting fell at home  On assessment pt presetns as a funcitonal ambulator without AD  No skilled PT needs at this time  endpcuraged to amb in hallway but notify staff first, ok to push iv pole     Barriers to Discharge (medical status)   Goals   Patient Goals get better   Plan   PT Frequency (d/c PT)   Recommendation   Recommendation Home with family support   Equipment Recommended (none)   PT - OK to Discharge Yes   Modified Markham Scale   Modified Markham Scale 0   Barthel Index   Feeding 10   Bathing 5   Grooming Score 5   Dressing Score 10   Bladder Score 10   Bowels Score 10   Toilet Use Score 10   Transfers (Bed/Chair) Score 15   Mobility (Level Surface) Score 15   Stairs Score 10   Barthel Index Score 100   Eliot Monaco, PT

## 2018-02-17 NOTE — ED NOTES
Patient transported to [address] [place] [address], [address] [place] [address]  02/16/18 7415

## 2018-02-17 NOTE — ED PROVIDER NOTES
History  Chief Complaint   Patient presents with    Vomiting     Patient presents to the ED with vomitting starting tonght  States she has chronic hypokalemia and hasnt taken her potassium since 2/5     Patient complains of lightheaded walking upstairs about 1 hour ago after eating salad for dinner  She the vomited food  She received zofran by ambulance and feels better now  She attributed the episode to low potassium as she takes miralax and lactulose daily for constipation and her primary care did not prescribe her potassium supplements since 2/5/18  She denies any constipation or diarrhea currently  Prior to Admission Medications   Prescriptions Last Dose Informant Patient Reported? Taking?    Cholecalciferol (VITAMIN D3) 2000 units capsule   Yes Yes   Sig: Take by mouth   HYDROcodone-acetaminophen (NORCO)  mg per tablet   Yes Yes   Sig: Take 1 tablet by mouth every 8 (eight) hours as needed   QUEtiapine (SEROQUEL XR) 400 mg 24 hr tablet   Yes No   Sig: Take by mouth daily at bedtime     QUEtiapine (SEROQUEL) 300 mg tablet   Yes No   Sig: Take 1 tablet by mouth daily   atorvastatin (LIPITOR) 40 mg tablet   Yes No   Sig: Take 1 tablet by mouth daily   clomiPRAMINE (ANAFRANIL) 50 mg capsule   Yes No   Sig: Take by mouth   clonazePAM (KLONOPIN) 0 5 mg tablet   Yes No   Sig: Take by mouth 3 (three) times a day     lamoTRIgine (LaMICtal) 100 mg tablet   Yes No   Sig: Take 3 tablets by mouth daily at bedtime     lubiprostone (AMITIZA) 24 mcg capsule   Yes Yes   Sig: Take 24 mcg by mouth 2 (two) times a day with meals   omeprazole (PriLOSEC) 40 MG capsule   Yes No   Sig: Take by mouth   potassium chloride (KLOR-CON M20) 20 mEq tablet   Yes Yes   Sig: Take 1 tablet by mouth 2 (two) times a day      Facility-Administered Medications: None       Past Medical History:   Diagnosis Date    Ankle sprain     left    Bipolar 2 disorder (HCC)     Chronic back pain     Hypercholesteremia     Hyperlipidemia     Panic attacks        Past Surgical History:   Procedure Laterality Date    TUBAL LIGATION Bilateral 1997       Family History   Problem Relation Age of Onset    Bipolar disorder Mother     Heart disease Father     Hypertension Father      I have reviewed and agree with the history as documented  Social History   Substance Use Topics    Smoking status: Never Smoker    Smokeless tobacco: Never Used    Alcohol use No        Review of Systems   All other systems reviewed and are negative  Physical Exam  ED Triage Vitals   Temperature Pulse Respirations Blood Pressure SpO2   02/16/18 1955 02/16/18 1939 02/16/18 1939 02/16/18 1945 02/16/18 1939   98 3 °F (36 8 °C) 102 20 143/70 99 %      Temp Source Heart Rate Source Patient Position - Orthostatic VS BP Location FiO2 (%)   02/16/18 1955 02/16/18 1939 02/16/18 1939 02/16/18 1939 --   Tympanic Monitor Sitting Left arm       Pain Score       02/16/18 1939       No Pain           Orthostatic Vital Signs  Vitals:    02/18/18 0724 02/18/18 1735 02/19/18 0025 02/19/18 0703   BP: 123/60 128/60 119/59 124/57   Pulse: 86 90 90 83   Patient Position - Orthostatic VS: Lying Lying Lying Lying       Physical Exam   Constitutional: She is oriented to person, place, and time  She appears well-developed and well-nourished  Patient cleaning her neck from vomit, large amount undigested food rolled up in blankets on her abdomen  HENT:   Mouth/Throat: Oropharynx is clear and moist  Mucous membranes are dry  Eyes: Conjunctivae and EOM are normal  Pupils are equal, round, and reactive to light  Neck: Normal range of motion  Neck supple  No spinous process tenderness present  Cardiovascular: Normal rate, regular rhythm, normal heart sounds and intact distal pulses  Pulmonary/Chest: Effort normal and breath sounds normal  No respiratory distress  She has no wheezes  Abdominal: Soft  Bowel sounds are normal  She exhibits no distension   There is tenderness in the epigastric area  There is no rebound and no guarding  Musculoskeletal: Normal range of motion  Neurological: She is alert and oriented to person, place, and time  She has normal strength  No sensory deficit  GCS eye subscore is 4  GCS verbal subscore is 5  GCS motor subscore is 6  Skin: Skin is warm and dry  No rash noted  Psychiatric: She has a normal mood and affect  Nursing note and vitals reviewed        ED Medications  Medications   atorvastatin (LIPITOR) tablet 40 mg (40 mg Oral Given 2/18/18 1713)   clonazePAM (KlonoPIN) tablet 0 5 mg (0 5 mg Oral Given 2/18/18 2149)   HYDROcodone-acetaminophen (NORCO) 5-325 mg per tablet 1 tablet (1 tablet Oral Given 2/19/18 0025)   lamoTRIgine (LaMICtal) tablet 100 mg (100 mg Oral Given 2/18/18 1714)   pantoprazole (PROTONIX) EC tablet 40 mg (40 mg Oral Given 2/19/18 0549)   QUEtiapine (SEROquel XR) 24 hr tablet 400 mg (400 mg Oral Given 2/18/18 0933)   QUEtiapine (SEROquel) tablet 200 mg (200 mg Oral Given 2/18/18 2149)   sodium chloride 0 9 % with KCl 40 mEq/L infusion (premix) (75 mL/hr Intravenous New Bag 2/18/18 2153)   ondansetron (ZOFRAN) injection 4 mg (not administered)   heparin (porcine) subcutaneous injection 5,000 Units (5,000 Units Subcutaneous Given 2/19/18 0550)   traMADol (ULTRAM) tablet 50 mg (not administered)   patient supplied medication (1 each Oral Given 2/18/18 2149)   potassium chloride (K-DUR,KLOR-CON) CR tablet 20 mEq (20 mEq Oral Given 2/18/18 1713)   clobetasol (TEMOVATE) 0 05 % cream ( Topical Given 2/18/18 1718)   ondansetron (ZOFRAN) injection 4 mg (4 mg Intravenous Given 2/16/18 1943)   sodium chloride 0 9 % bolus 1,000 mL (0 mL Intravenous Stopped 2/16/18 2051)   potassium chloride 20 mEq IVPB (premix) (0 mEq Intravenous Stopped 2/16/18 2300)   potassium chloride (K-DUR,KLOR-CON) CR tablet 40 mEq (40 mEq Oral Given 2/16/18 2053)   potassium chloride (K-DUR,KLOR-CON) CR tablet 40 mEq (40 mEq Oral Given 2/17/18 0602)       Diagnostic Studies  Results Reviewed     Procedure Component Value Units Date/Time    Urinalysis [[id number]]  (Abnormal) Collected:  02/17/18 0629    Lab Status:  Final result Specimen:  Urine from Urine, Clean Catch Updated:  02/17/18 0826     Color, UA Yellow     Clarity, UA Clear     Specific Gravity, UA <=1 005     pH, UA 6 5     Leukocytes, UA Negative     Nitrite, UA Negative     Protein, UA Negative mg/dl      Glucose, UA Negative mg/dl      Ketones, UA Negative mg/dl      Urobilinogen, UA 0 2 E U /dl      Bilirubin, UA Negative     Blood, UA Trace-Intact (A)    Comprehensive metabolic panel [[id number]]  (Abnormal) Collected:  02/17/18 0555    Lab Status:  Final result Specimen:  Blood from Hand, Right Updated:  02/17/18 3557     Sodium 147 (H) mmol/L      Potassium 3 3 (L) mmol/L      Chloride 118 (H) mmol/L      CO2 17 (L) mmol/L      Anion Gap 12 mmol/L      BUN 22 mg/dL      Creatinine 2 20 (H) mg/dL      Glucose 112 mg/dL      Calcium 8 4 mg/dL      AST 21 U/L      ALT 21 U/L      Alkaline Phosphatase 168 (H) U/L      Total Protein 5 6 (L) g/dL      Albumin 2 3 (L) g/dL      Total Bilirubin 0 20 mg/dL      eGFR 25 ml/min/1 73sq m     Narrative:         National Kidney Disease Education Program recommendations are as follows:  GFR calculation is accurate only with a steady state creatinine  Chronic Kidney disease less than 60 ml/min/1 73 sq  meters  Kidney failure less than 15 ml/min/1 73 sq  meters      Lipid panel [[id number]]  (Abnormal) Collected:  02/17/18 0555    Lab Status:  Final result Specimen:  Blood from Hand, Right Updated:  02/17/18 0728     Cholesterol 176 mg/dL      Triglycerides 124 mg/dL      HDL, Direct 68 (H) mg/dL      LDL Calculated 83 mg/dL     Narrative:         Triglyceride:        Normal               <150 mg/dl        Borderline High     150-199 mg/dl        High               200-499 mg/dl        Very High           >499 mg/dl      Magnesium [[id number]]  (Normal) Collected:  02/17/18 0555    Lab Status:  Final result Specimen:  Blood from Hand, Right Updated:  02/17/18 0728     Magnesium 2 5 mg/dL     Phosphorus [[id number]]  (Normal) Collected:  02/17/18 0555    Lab Status:  Final result Specimen:  Blood from Hand, Right Updated:  02/17/18 0728     Phosphorus 3 7 mg/dL     CBC (With Platelets) [[id number]]  (Abnormal) Collected:  02/17/18 0555    Lab Status:  Final result Specimen:  Blood from Hand, Right Updated:  02/17/18 0615     WBC 6 02 Thousand/uL      RBC 3 18 (L) Million/uL      Hemoglobin 10 0 (L) g/dL      Hematocrit 31 5 (L) %      MCV 99 (H) fL      MCH 31 4 pg      MCHC 31 7 g/dL      RDW 13 8 %      Platelets 868 Thousands/uL      MPV 9 3 fL     Comprehensive metabolic panel [[id number]]  (Abnormal) Collected:  02/16/18 1950    Lab Status:  Final result Specimen:  Blood from Arm, Left Updated:  02/16/18 2034     Sodium 137 mmol/L      Potassium 2 5 (LL) mmol/L      Chloride 102 mmol/L      CO2 20 (L) mmol/L      Anion Gap 15 (H) mmol/L      BUN 29 (H) mg/dL      Creatinine 2 47 (H) mg/dL      Glucose 113 mg/dL      Calcium 8 4 mg/dL      AST 21 U/L      ALT 28 U/L      Alkaline Phosphatase 198 (H) U/L      Total Protein 6 9 g/dL      Albumin 2 9 (L) g/dL      Total Bilirubin 0 20 mg/dL      eGFR 21 ml/min/1 73sq m     Narrative:         National Kidney Disease Education Program recommendations are as follows:  GFR calculation is accurate only with a steady state creatinine  Chronic Kidney disease less than 60 ml/min/1 73 sq  meters  Kidney failure less than 15 ml/min/1 73 sq  meters      Lipase [[id number]]  (Abnormal) Collected:  02/16/18 1950    Lab Status:  Final result Specimen:  Blood from Arm, Left Updated:  02/16/18 2032     Lipase 1,230 (H) u/L     Magnesium [[id number]]  (Normal) Collected:  02/16/18 1950    Lab Status:  Final result Specimen:  Blood from Arm, Left Updated:  02/16/18 2032     Magnesium 2 1 mg/dL     CBC and differential [[id number]]  (Abnormal) Collected:  02/16/18 1950    Lab Status:  Final result Specimen:  Blood from Arm, Left Updated:  02/16/18 2010     WBC 8 54 Thousand/uL      RBC 3 45 (L) Million/uL      Hemoglobin 11 2 (L) g/dL      Hematocrit 33 7 (L) %      MCV 98 fL      MCH 32 5 pg      MCHC 33 2 g/dL      RDW 13 8 %      MPV 9 7 fL      Platelets 669 (H) Thousands/uL      Neutrophils Relative 65 %      Lymphocytes Relative 24 %      Monocytes Relative 9 %      Eosinophils Relative 2 %      Basophils Relative 0 %      Neutrophils Absolute 5 56 Thousands/µL      Lymphocytes Absolute 2 08 Thousands/µL      Monocytes Absolute 0 74 Thousand/µL      Eosinophils Absolute 0 13 Thousand/µL      Basophils Absolute 0 03 Thousands/µL                  CT abdomen pelvis wo contrast   ED Interpretation by Radha Peng DO (02/17 0034)   Read by virtual radiology - fecal retention, dilated pancreatic duct      Final Result by Kristen Limon MD (02/17 9841)      Dilated pancreatic duct with potential filling defect seen at the level of the ampulla within the duodenum  Recommend ERCP for further evaluation  Underlying mass cannot be excluded  Moderate fecal retention with fluid and stool noted throughout the colon may suggest underlying constipation  Distended gallbladder without gallstones  Original report by virtual radiology                    Workstation performed: [id number]                    Procedures  ECG 12 Lead Documentation  Date/Time: 2/16/2018 8:06 PM  Performed by: Neeta Garcia  Authorized by: Neeta Garcia     Indications / Diagnosis:  Vomiting lightheaded  ECG reviewed by me, the ED Provider: yes    Patient location:  ED  Previous ECG:     Previous ECG:  Compared to current    Comparison ECG info:  1-17    Similarity:  No change  Interpretation:     Interpretation: normal    Rate:     ECG rate:  98    ECG rate assessment: normal    Rhythm:     Rhythm: sinus rhythm    Ectopy:     Ectopy: none    QRS:     QRS axis:  Normal QRS intervals:  Normal  Conduction:     Conduction: normal    ST segments:     ST segments:  Non-specific    Depression:  V4, V5 and V6  T waves:     T waves: normal    Other findings:     Other findings: prolonged qTc interval               Phone Contacts  ED Phone Contact    ED Course  ED Course as of Feb 19 0753 Fri Feb 16, 2018 2049 Patient now stating that she has had abdominal pain over last few days, none currently  Non tender on exam but lipase elevated    Will get CT abd with contrast to rule out pancreatitis    2114 Spoke with Kandi Arizmendi - will call him back once ct scan is completed                                MDM  Number of Diagnoses or Management Options  Dilated pancreatic duct: new and requires workup  Hypokalemia: new and requires workup     Amount and/or Complexity of Data Reviewed  Clinical lab tests: ordered and reviewed  Tests in the radiology section of CPT®: reviewed and ordered  Obtain history from someone other than the patient: yes  Discuss the patient with other providers: yes    Patient Progress  Patient progress: improved    CritCare Time    Disposition  Final diagnoses:   Hypokalemia - acute with acute on chronic renal failure   Dilated pancreatic duct     Time reflects when diagnosis was documented in both MDM as applicable and the Disposition within this note     Time User Action Codes Description Comment    2/16/2018 11:45 PM Anibal Nelson Add [E87 6] Hypokalemia     2/16/2018 11:46 PM Anibal Nelson Modify [E87 6] Hypokalemia acute with acute on chronic renal failure    2/16/2018 11:46 PM Anibal Nelson Add [K86 89] Dilated pancreatic duct     2/16/2018 11:53 PM Giancarlo Alberto Modify [E87 6] Hypokalemia acute with acute on chronic renal failure    2/16/2018 11:53 PM Christi Alberto Add [K85 90] Pancreatitis     2/16/2018 11:54 PM Brittany Alberto 64 [E87 6] Hypokalemia acute with acute on chronic renal failure    2/16/2018 11:54 PM Christi Alberto Modify [K85 90] Pancreatitis 2/16/2018 11:54 PM Giancarlo Alberto Modify [E87 6] Hypokalemia acute with acute on chronic renal failure    2/16/2018 11:54 PM Giancarlo Alberto Modify [E87 6] Hypokalemia acute with acute on chronic renal failure      ED Disposition     ED Disposition Condition Comment    Admit  Case was discussed with Alexander Dumont** and the patient's admission status was agreed to be Admission Status: inpatient status to the service of Dr Marques Ortega   Follow-up Information    None       Current Discharge Medication List      CONTINUE these medications which have NOT CHANGED    Details   Cholecalciferol (VITAMIN D3) 2000 units capsule Take by mouth      HYDROcodone-acetaminophen (NORCO)  mg per tablet Take 1 tablet by mouth every 8 (eight) hours as needed      lubiprostone (AMITIZA) 24 mcg capsule Take 24 mcg by mouth 2 (two) times a day with meals      potassium chloride (KLOR-CON M20) 20 mEq tablet Take 1 tablet by mouth 2 (two) times a day      atorvastatin (LIPITOR) 40 mg tablet Take 1 tablet by mouth daily      clomiPRAMINE (ANAFRANIL) 50 mg capsule Take by mouth      clonazePAM (KLONOPIN) 0 5 mg tablet Take by mouth 3 (three) times a day        lamoTRIgine (LaMICtal) 100 mg tablet Take 3 tablets by mouth daily at bedtime        omeprazole (PriLOSEC) 40 MG capsule Take by mouth      QUEtiapine (SEROQUEL XR) 400 mg 24 hr tablet Take by mouth daily at bedtime        QUEtiapine (SEROQUEL) 300 mg tablet Take 1 tablet by mouth daily           No discharge procedures on file      ED Provider  Electronically Signed by           Damari Aden DO  02/19/18 7302

## 2018-02-17 NOTE — PROGRESS NOTES
Progress Note - Lee Wang 56 y o  female MRN: [id number]    Unit/Bed#: [address] [place] [address] [id number] Encounter: [id number]      Assessment:  Principal Problem:    Pancreatitis  Active Problems:    CAM (acute kidney injury) ([address] )    Hypokalemia    Bipolar 2 disorder (HCC)    Hypercholesteremia    Chronic kidney disease, stage 3  Resolved Problems:    * No resolved hospital problems  *        Plan:  · Acute pancreatitis-day 2  Of bowel rest-MRCP ordered by Dr Mabry with GI team-concern over local inflammation  Continue on sips clear liquids and IV fluids with bowel rest   Lipase decreased from 1200to 243  · Hypokalemia-improving but still mildly decreased this a m  at 3 3-will increase in IV fluid  · Acute kidney injury-improving from 2 47 down to 2 20 today continue on IV fluid hydration  · Bipolar disorder  · Narcotic use-family found she was taking more than her usual t i d  dosing by the fill dates on her recent bottle    Subjective:   Patient with continued pain in the midepigastric and right upper quadrant area but not as severe as on presentation  No nausea or vomiting  ROS  Comprehensive system review negative other than noted above    Objective:     Vitals: Blood pressure 111/59, pulse 92, temperature 98 °F (36 7 °C), temperature source Oral, resp  rate 12, height 5' 7" (1 702 m), weight 68 7 kg (151 lb 7 3 oz), SpO2 92 %, not currently breastfeeding  ,Body mass index is 23 72 kg/m²    Current Facility-Administered Medications   Medication Dose Route Frequency Provider Last Rate Last Dose    atorvastatin (LIPITOR) tablet 40 mg  40 mg Oral Daily With Keron Frey MD        clonazePAM (KlonoPIN) tablet 0 5 mg  0 5 mg Oral TID Fuad Bailey MD   0 5 mg at 02/17/18 0957    heparin (porcine) subcutaneous injection 5,000 Units  5,000 Units Subcutaneous [facility] Fuad Bailey MD   5,000 Units at 02/17/18 1312    HYDROcodone-acetaminophen (NORCO) 5-325 mg per tablet 1 tablet  1 tablet Oral Q6H PRN Aaron Downs MD   1 tablet at 02/17/18 1311    lamoTRIgine (LaMICtal) tablet 100 mg  100 mg Oral BID Aaron Downs MD   100 mg at 02/17/18 0957    ondansetron (ZOFRAN) injection 4 mg  4 mg Intravenous Q6H PRN Aaron Downs MD        pantoprazole (PROTONIX) EC tablet 40 mg  40 mg Oral Early Morning Aaron Downs MD   40 mg at 02/17/18 0602    patient supplied medication  1 each Oral TID Chin Bang DO        QUEtiapine (SEROquel XR) 24 hr tablet 400 mg  400 mg Oral Daily Aaron Downs MD   400 mg at 02/17/18 9854    QUEtiapine (SEROquel) tablet 200 mg  200 mg Oral HS Aaron Downs MD   200 mg at 02/17/18 0108    sodium chloride 0 9 % with KCl 40 mEq/L infusion (premix)  125 mL/hr Intravenous Continuous Aaron Downs MD   Stopped at 02/17/18 1005    traMADol (ULTRAM) tablet 50 mg  50 mg Oral Q8H PRN Aaron Downs MD         Prescriptions Prior to Admission   Medication    Cholecalciferol (VITAMIN D3) 2000 units capsule    HYDROcodone-acetaminophen (NORCO)  mg per tablet    lubiprostone (AMITIZA) 24 mcg capsule    potassium chloride (KLOR-CON M20) 20 mEq tablet    atorvastatin (LIPITOR) 40 mg tablet    clomiPRAMINE (ANAFRANIL) 50 mg capsule    clonazePAM (KLONOPIN) 0 5 mg tablet    lamoTRIgine (LaMICtal) 100 mg tablet    omeprazole (PriLOSEC) 40 MG capsule    QUEtiapine (SEROQUEL XR) 400 mg 24 hr tablet    QUEtiapine (SEROQUEL) 300 mg tablet         Intake/Output Summary (Last 24 hours) at 02/17/18 1550  Last data filed at 02/17/18 1437   Gross per 24 hour   Intake             2475 ml   Output             1500 ml   Net              975 ml       Physical Exam:  General appearance: alert and cooperative mild distress with exam  Neck: no adenopathy, no JVD, supple, symmetrical, trachea midline and thyroid not enlarged, symmetric, no tenderness/mass/nodules  Lungs: clear to auscultation bilaterally  Heart: regular rate and rhythm, S1, S2 normal, no murmur, click, rub or gallop  Abdomen: Mild generalized fullness with some right upper quadrant midepigastric tenderness  Extremities: extremities normal, warm and well-perfused; no cyanosis, clubbing, or edema  Skin: Skin color, texture, turgor normal  No rashes or lesions  Neurologic: Grossly normal       Lab, Imaging and other studies: I have personally reviewed pertinent reports  Results from last 7 days  Lab Units 02/17/18  0555 02/16/18  1950   WBC Thousand/uL 6 02 8 54   HEMOGLOBIN g/dL 10 0* 11 2*   HEMATOCRIT % 31 5* 33 7*   PLATELETS Thousands/uL 363 393*   NEUTROS PCT %  --  65   LYMPHS PCT %  --  24   MONOS PCT %  --  9   EOS PCT %  --  2       Results from last 7 days  Lab Units 02/17/18  0555 02/16/18  1950   SODIUM mmol/L 147* 137   POTASSIUM mmol/L 3 3* 2 5*   CHLORIDE mmol/L 118* 102   CO2 mmol/L 17* 20*   BUN mg/dL 22 29*   CREATININE mg/dL 2 20* 2 47*   CALCIUM mg/dL 8 4 8 4   TOTAL PROTEIN g/dL 5 6* 6 9   BILIRUBIN TOTAL mg/dL 0 20 0 20   ALK PHOS U/L 168* 198*   ALT U/L 21 28   AST U/L 21 21   GLUCOSE RANDOM mg/dL 112 113     No results found for: TROPONINI, CKMB, CKTOTAL      Lab Results   Component Value Date    URINECX >100,000 cfu/ml Escherichia coli 01/18/2017    URINECX 60,000-69,000 cfu/ml Klebsiella oxytoca 01/18/2017       Imaging:  No results found for this or any previous visit  No results found for this or any previous visit  PATIENT CENTERED ROUNDS: I have performed rounds with the nursing staff            Shanna Lorenzana DO

## 2018-02-18 LAB
ALBUMIN SERPL BCP-MCNC: 2.2 G/DL (ref 3.5–5)
ALP SERPL-CCNC: 151 U/L (ref 46–116)
ALT SERPL W P-5'-P-CCNC: 25 U/L (ref 12–78)
ANION GAP SERPL CALCULATED.3IONS-SCNC: 9 MMOL/L (ref 4–13)
AST SERPL W P-5'-P-CCNC: 22 U/L (ref 5–45)
BASOPHILS # BLD AUTO: 0.05 THOUSANDS/ΜL (ref 0–0.1)
BASOPHILS NFR BLD AUTO: 1 % (ref 0–1)
BILIRUB SERPL-MCNC: 0.1 MG/DL (ref 0.2–1)
BUN SERPL-MCNC: 16 MG/DL (ref 5–25)
CALCIUM SERPL-MCNC: 8.2 MG/DL (ref 8.3–10.1)
CHLORIDE SERPL-SCNC: 119 MMOL/L (ref 100–108)
CO2 SERPL-SCNC: 18 MMOL/L (ref 21–32)
CREAT SERPL-MCNC: 1.91 MG/DL (ref 0.6–1.3)
EOSINOPHIL # BLD AUTO: 0.14 THOUSAND/ΜL (ref 0–0.61)
EOSINOPHIL NFR BLD AUTO: 3 % (ref 0–6)
ERYTHROCYTE [DISTWIDTH] IN BLOOD BY AUTOMATED COUNT: 14.3 % (ref 11.6–15.1)
GFR SERPL CREATININE-BSD FRML MDRD: 29 ML/MIN/1.73SQ M
GLUCOSE SERPL-MCNC: 93 MG/DL (ref 65–140)
HCT VFR BLD AUTO: 31.4 % (ref 34.8–46.1)
HGB BLD-MCNC: 9.4 G/DL (ref 11.5–15.4)
LIPASE SERPL-CCNC: 138 U/L (ref 73–393)
LYMPHOCYTES # BLD AUTO: 1.51 THOUSANDS/ΜL (ref 0.6–4.47)
LYMPHOCYTES NFR BLD AUTO: 31 % (ref 14–44)
MCH RBC QN AUTO: 30.1 PG (ref 26.8–34.3)
MCHC RBC AUTO-ENTMCNC: 29.9 G/DL (ref 31.4–37.4)
MCV RBC AUTO: 101 FL (ref 82–98)
MONOCYTES # BLD AUTO: 0.4 THOUSAND/ΜL (ref 0.17–1.22)
MONOCYTES NFR BLD AUTO: 8 % (ref 4–12)
NEUTROPHILS # BLD AUTO: 2.73 THOUSANDS/ΜL (ref 1.85–7.62)
NEUTS SEG NFR BLD AUTO: 57 % (ref 43–75)
PLATELET # BLD AUTO: 369 THOUSANDS/UL (ref 149–390)
PMV BLD AUTO: 9.3 FL (ref 8.9–12.7)
POTASSIUM SERPL-SCNC: 4.6 MMOL/L (ref 3.5–5.3)
PROT SERPL-MCNC: 5.3 G/DL (ref 6.4–8.2)
RBC # BLD AUTO: 3.12 MILLION/UL (ref 3.81–5.12)
SODIUM SERPL-SCNC: 146 MMOL/L (ref 136–145)
WBC # BLD AUTO: 4.83 THOUSAND/UL (ref 4.31–10.16)

## 2018-02-18 PROCEDURE — 85025 COMPLETE CBC W/AUTO DIFF WBC: CPT | Performed by: INTERNAL MEDICINE

## 2018-02-18 PROCEDURE — 80053 COMPREHEN METABOLIC PANEL: CPT | Performed by: INTERNAL MEDICINE

## 2018-02-18 PROCEDURE — 99232 SBSQ HOSP IP/OBS MODERATE 35: CPT | Performed by: INTERNAL MEDICINE

## 2018-02-18 PROCEDURE — 83690 ASSAY OF LIPASE: CPT | Performed by: INTERNAL MEDICINE

## 2018-02-18 RX ORDER — CLOBETASOL PROPIONATE 0.5 MG/G
CREAM TOPICAL 2 TIMES DAILY
Status: DISCONTINUED | OUTPATIENT
Start: 2018-02-18 | End: 2018-02-20 | Stop reason: HOSPADM

## 2018-02-18 RX ADMIN — LAMOTRIGINE 100 MG: 100 TABLET ORAL at 17:14

## 2018-02-18 RX ADMIN — CLONAZEPAM 0.5 MG: 0.5 TABLET ORAL at 17:13

## 2018-02-18 RX ADMIN — ATORVASTATIN CALCIUM 40 MG: 40 TABLET, FILM COATED ORAL at 17:13

## 2018-02-18 RX ADMIN — HEPARIN SODIUM 5000 UNITS: 5000 INJECTION, SOLUTION INTRAVENOUS; SUBCUTANEOUS at 05:24

## 2018-02-18 RX ADMIN — CLOBETASOL PROPIONATE: 0.5 CREAM TOPICAL at 17:18

## 2018-02-18 RX ADMIN — POTASSIUM CHLORIDE 20 MEQ: 1500 TABLET, EXTENDED RELEASE ORAL at 17:13

## 2018-02-18 RX ADMIN — SODIUM CHLORIDE AND POTASSIUM CHLORIDE 125 ML/HR: 9; 2.98 INJECTION, SOLUTION INTRAVENOUS at 01:36

## 2018-02-18 RX ADMIN — QUETIAPINE 400 MG: 400 TABLET, EXTENDED RELEASE ORAL at 09:33

## 2018-02-18 RX ADMIN — QUETIAPINE 200 MG: 200 TABLET ORAL at 21:49

## 2018-02-18 RX ADMIN — POTASSIUM CHLORIDE 20 MEQ: 1500 TABLET, EXTENDED RELEASE ORAL at 09:36

## 2018-02-18 RX ADMIN — SODIUM CHLORIDE AND POTASSIUM CHLORIDE 75 ML/HR: 9; 2.98 INJECTION, SOLUTION INTRAVENOUS at 21:53

## 2018-02-18 RX ADMIN — CLONAZEPAM 0.5 MG: 0.5 TABLET ORAL at 21:49

## 2018-02-18 RX ADMIN — HYDROCODONE BITARTRATE AND ACETAMINOPHEN 1 TABLET: 5; 325 TABLET ORAL at 04:23

## 2018-02-18 RX ADMIN — HEPARIN SODIUM 5000 UNITS: 5000 INJECTION, SOLUTION INTRAVENOUS; SUBCUTANEOUS at 21:49

## 2018-02-18 RX ADMIN — HYDROCODONE BITARTRATE AND ACETAMINOPHEN 1 TABLET: 5; 325 TABLET ORAL at 17:14

## 2018-02-18 RX ADMIN — PANTOPRAZOLE SODIUM 40 MG: 40 TABLET, DELAYED RELEASE ORAL at 05:24

## 2018-02-18 RX ADMIN — LAMOTRIGINE 100 MG: 100 TABLET ORAL at 09:35

## 2018-02-18 RX ADMIN — CLONAZEPAM 0.5 MG: 0.5 TABLET ORAL at 09:36

## 2018-02-18 RX ADMIN — SODIUM CHLORIDE AND POTASSIUM CHLORIDE 125 ML/HR: 9; 2.98 INJECTION, SOLUTION INTRAVENOUS at 09:37

## 2018-02-18 RX ADMIN — CLOBETASOL PROPIONATE: 0.5 CREAM TOPICAL at 13:23

## 2018-02-18 RX ADMIN — HEPARIN SODIUM 5000 UNITS: 5000 INJECTION, SOLUTION INTRAVENOUS; SUBCUTANEOUS at 13:22

## 2018-02-18 NOTE — PROGRESS NOTES
Progress Note - Devin Limon 56 y o  female MRN: [id number]    Unit/Bed#: [address] [place] [address] 209-01 Encounter: [id number]      Assessment:  Principal Problem:    Pancreatitis  Active Problems:    CAM (acute kidney injury) ([address] )    Hypokalemia    Bipolar 2 disorder (HCC)    Hypercholesteremia    Chronic kidney disease, stage 3  Resolved Problems:    * No resolved hospital problems  *        Plan:  · Acute pancreatitis-less symptomatic- patient is feeling better and wishes to try increase diet-her lipase is back to a normal level-await Dr Xochilt Jo opinion for further testing  ·  Acute kidney injury-on chronic kidney disease stage 3-appears euvolemic currently-improving creatinine down to 1 9 from 2 47  · Right arm rash-this has been present since admission  She does sleep with her dogs in bed on that side reports increased pruritus      Subjective:   No nausea or vomiting  Less abdominal pain this morning  ROS  Comprehensive system review negative other than noted above    Objective:     Vitals: Blood pressure 123/60, pulse 86, temperature 97 8 °F (36 6 °C), temperature source Oral, resp  rate 17, height 5' 7" (1 702 m), weight 70 kg (154 lb 5 2 oz), SpO2 98 %, not currently breastfeeding  ,Body mass index is 24 17 kg/m²    Current Facility-Administered Medications   Medication Dose Route Frequency Provider Last Rate Last Dose    atorvastatin (LIPITOR) tablet 40 mg  40 mg Oral Daily With Ofelia Harris MD   40 mg at 02/17/18 1721    clonazePAM (KlonoPIN) tablet 0 5 mg  0 5 mg Oral TID Reza Major MD   0 5 mg at 02/18/18 0936    heparin (porcine) subcutaneous injection 5,000 Units  5,000 Units Subcutaneous [facility] Reza Major MD   5,000 Units at 02/18/18 0502    HYDROcodone-acetaminophen (NORCO) 5-325 mg per tablet 1 tablet  1 tablet Oral Q6H PRN Reza Major MD   1 tablet at 02/18/18 9844    lamoTRIgine (LaMICtal) tablet 100 mg  100 mg Oral BID Reza Major MD   100 mg at 02/18/18 3717  ondansetron (ZOFRAN) injection 4 mg  4 mg Intravenous Q6H PRN Daniela Martinez MD        pantoprazole (PROTONIX) EC tablet 40 mg  40 mg Oral Early Morning Daniela Martinez MD   40 mg at 02/18/18 0524    patient supplied medication  1 each Oral TID Williemaalee CarrilloUgo, DO   1 each at 02/18/18 0934    potassium chloride (K-DUR,KLOR-CON) CR tablet 20 mEq  20 mEq Oral BID Bette Fly, DO   20 mEq at 02/18/18 5929    QUEtiapine (SEROquel XR) 24 hr tablet 400 mg  400 mg Oral Daily Daniela Martinez MD   400 mg at 02/18/18 0933    QUEtiapine (SEROquel) tablet 200 mg  200 mg Oral HS Daniela Martinez MD   200 mg at 02/17/18 2147    sodium chloride 0 9 % with KCl 40 mEq/L infusion (premix)  125 mL/hr Intravenous Continuous Daniela Martinez  mL/hr at 02/18/18 0937 125 mL/hr at 02/18/18 0937    traMADol (ULTRAM) tablet 50 mg  50 mg Oral Q8H PRN Daniela Martinez MD         Prescriptions Prior to Admission   Medication    Cholecalciferol (VITAMIN D3) 2000 units capsule    HYDROcodone-acetaminophen (NORCO)  mg per tablet    lubiprostone (AMITIZA) 24 mcg capsule    potassium chloride (KLOR-CON M20) 20 mEq tablet    atorvastatin (LIPITOR) 40 mg tablet    clomiPRAMINE (ANAFRANIL) 50 mg capsule    clonazePAM (KLONOPIN) 0 5 mg tablet    lamoTRIgine (LaMICtal) 100 mg tablet    omeprazole (PriLOSEC) 40 MG capsule    QUEtiapine (SEROQUEL XR) 400 mg 24 hr tablet    QUEtiapine (SEROQUEL) 300 mg tablet         Intake/Output Summary (Last 24 hours) at 02/18/18 1132  Last data filed at 02/18/18 1125   Gross per 24 hour   Intake                0 ml   Output              650 ml   Net             -650 ml       Physical Exam:  General appearance: alert and oriented, in no acute distress  Neck: no adenopathy, no JVD, supple, symmetrical, trachea midline and thyroid not enlarged, symmetric, no tenderness/mass/nodules  Lungs: clear to auscultation bilaterally  Heart: regular rate and rhythm, S1, S2 normal, no murmur, click, rub or gallop  Abdomen: soft, non-tender; bowel sounds normal; no masses,  no organomegaly  Extremities: extremities normal, warm and well-perfused; no cyanosis, clubbing, or edema  Skin: excoriated rash on right arm  Neurologic:  No focal changes       Lab, Imaging and other studies: I have personally reviewed pertinent reports  Results from last 7 days  Lab Units 02/18/18  0436 02/17/18  0555 02/16/18  1950   WBC Thousand/uL 4 83 6 02 8 54   HEMOGLOBIN g/dL 9 4* 10 0* 11 2*   HEMATOCRIT % 31 4* 31 5* 33 7*   PLATELETS Thousands/uL 369 363 393*   NEUTROS PCT % 57  --  65   LYMPHS PCT % 31  --  24   MONOS PCT % 8  --  9   EOS PCT % 3  --  2       Results from last 7 days  Lab Units 02/18/18  0436 02/17/18  0555 02/16/18  1950   SODIUM mmol/L 146* 147* 137   POTASSIUM mmol/L 4 6 3 3* 2 5*   CHLORIDE mmol/L 119* 118* 102   CO2 mmol/L 18* 17* 20*   BUN mg/dL 16 22 29*   CREATININE mg/dL 1 91* 2 20* 2 47*   CALCIUM mg/dL 8 2* 8 4 8 4   TOTAL PROTEIN g/dL 5 3* 5 6* 6 9   BILIRUBIN TOTAL mg/dL 0 10* 0 20 0 20   ALK PHOS U/L 151* 168* 198*   ALT U/L 25 21 28   AST U/L 22 21 21   GLUCOSE RANDOM mg/dL 93 112 113     No results found for: TROPONINI, CKMB, CKTOTAL      Lab Results   Component Value Date    URINECX >100,000 cfu/ml Escherichia coli 01/18/2017    URINECX 60,000-69,000 cfu/ml Klebsiella oxytoca 01/18/2017       Imaging:  No results found for this or any previous visit  No results found for this or any previous visit  PATIENT CENTERED ROUNDS: I have performed rounds with the nursing staff            Amol Simon DO

## 2018-02-18 NOTE — PROGRESS NOTES
Progress Note - GI   Nicol Landa 56 y o  female MRN: [id number]  Unit/Bed#: [address] [place] [address] 209-01 Encounter: [id number]      ASSESSMENT:  57F with chronic pains on narcotics, h/o Biopolar presenting with dizziness/abd pain/n/v  Lipase 1230 @ adm, now 138  LFTs only w mildly elev alk phos (normal ast/alt/TB)  Noncon CT showing filling defect @ ampulla and dilated panc duct @ 7mm w normal biliary tree  1  Acute Pancreatitis - etiologies include gallstones vs ampullary/panc mass vs med induced? Normal triglycerides  No h/o ETOH  2  Hypokalemia and CAM     PLAN:  - Abd Pain improved - tolerating diet  - Pain control and Antiemetics as needed  - Check MRCP  - Follow LFTs and lipase  - Pending MRCP, can plan on ERCP once acute pancreatitis improved, likely as outpatient  Chief Complaint   Patient presents with    Vomiting     Patient presents to the ED with vomitting starting tonght  States she has chronic hypokalemia and hasnt taken her potassium since 2/5       SUBJECTIVE/HPI  Feeling well  No further abd pain/n/v  No fevers       /60 (BP Location: Left arm)   Pulse 86   Temp 97 8 °F (36 6 °C) (Oral)   Resp 17   Ht 5' 7" (1 702 m)   Wt 70 kg (154 lb 5 2 oz)   LMP  (LMP Unknown)   SpO2 98%   BMI 24 17 kg/m²       PHYSICALEXAM    General appearance: alert, appears stated age and cooperative  HEENT: Normocephalic, w/o obvious abnormality, atraumatic, PERRL, EOM's intact  Throat: lips, mucosa, and tongue normal; teeth and gums normal  Neck: no adenopathy, no JVD, supple, symmetrical, trachea midline  Lungs: clear to auscultation bilaterally, No wheeze/rales  Heart: regular rate and rhythm, S1, S2 normal, no murmur  Abdomen: soft, mildly TTP in the epig region; bowel sounds normal; no masses,  no organomegaly  Extremities: extremities normal, atraumatic, no cyanosis or edema  Skin: Skin color, texture, turgor normal  No rashes or lesions  Neurologic: Grossly normal      Lab Results   Component Value Date GLUCOSE 93 02/18/2018    CALCIUM 8 2 (L) 02/18/2018     (H) 02/18/2018    K 4 6 02/18/2018    CO2 18 (L) 02/18/2018     (H) 02/18/2018    BUN 16 02/18/2018    CREATININE 1 91 (H) 02/18/2018     Lab Results   Component Value Date    WBC 4 83 02/18/2018    HGB 9 4 (L) 02/18/2018    HCT 31 4 (L) 02/18/2018     (H) 02/18/2018     02/18/2018     Lab Results   Component Value Date    ALT 25 02/18/2018    AST 22 02/18/2018    ALKPHOS 151 (H) 02/18/2018    BILITOT 0 10 (L) 02/18/2018     No results found for: INR  No components found for: AMYLJKJJJASE  Lab Results   Component Value Date    LIPASE 138 02/18/2018     Lab Results   Component Value Date    IRON 44 (L) 11/10/2017    TIBC 164 (L) 11/10/2017    FERRITIN 50 11/10/2017           Lyubov Chand MD

## 2018-02-19 ENCOUNTER — APPOINTMENT (INPATIENT)
Dept: MRI IMAGING | Facility: HOSPITAL | Age: 58
DRG: 682 | End: 2018-02-19
Payer: MEDICARE

## 2018-02-19 LAB
ANION GAP SERPL CALCULATED.3IONS-SCNC: 7 MMOL/L (ref 4–13)
BUN SERPL-MCNC: 17 MG/DL (ref 5–25)
CALCIUM SERPL-MCNC: 8.1 MG/DL (ref 8.3–10.1)
CHLORIDE SERPL-SCNC: 118 MMOL/L (ref 100–108)
CO2 SERPL-SCNC: 21 MMOL/L (ref 21–32)
CREAT SERPL-MCNC: 1.83 MG/DL (ref 0.6–1.3)
GFR SERPL CREATININE-BSD FRML MDRD: 31 ML/MIN/1.73SQ M
GLUCOSE SERPL-MCNC: 95 MG/DL (ref 65–140)
POTASSIUM SERPL-SCNC: 5.2 MMOL/L (ref 3.5–5.3)
SODIUM SERPL-SCNC: 146 MMOL/L (ref 136–145)

## 2018-02-19 PROCEDURE — 76376 3D RENDER W/INTRP POSTPROCES: CPT

## 2018-02-19 PROCEDURE — 99232 SBSQ HOSP IP/OBS MODERATE 35: CPT | Performed by: INTERNAL MEDICINE

## 2018-02-19 PROCEDURE — 74181 MRI ABDOMEN W/O CONTRAST: CPT

## 2018-02-19 PROCEDURE — 80048 BASIC METABOLIC PNL TOTAL CA: CPT | Performed by: INTERNAL MEDICINE

## 2018-02-19 RX ADMIN — ATORVASTATIN CALCIUM 40 MG: 40 TABLET, FILM COATED ORAL at 15:42

## 2018-02-19 RX ADMIN — HEPARIN SODIUM 5000 UNITS: 5000 INJECTION, SOLUTION INTRAVENOUS; SUBCUTANEOUS at 05:50

## 2018-02-19 RX ADMIN — HEPARIN SODIUM 5000 UNITS: 5000 INJECTION, SOLUTION INTRAVENOUS; SUBCUTANEOUS at 13:36

## 2018-02-19 RX ADMIN — HYDROCODONE BITARTRATE AND ACETAMINOPHEN 1 TABLET: 5; 325 TABLET ORAL at 00:25

## 2018-02-19 RX ADMIN — CLONAZEPAM 0.5 MG: 0.5 TABLET ORAL at 23:00

## 2018-02-19 RX ADMIN — CLOBETASOL PROPIONATE: 0.5 CREAM TOPICAL at 08:24

## 2018-02-19 RX ADMIN — TRAMADOL HYDROCHLORIDE 50 MG: 50 TABLET, FILM COATED ORAL at 08:26

## 2018-02-19 RX ADMIN — HYDROCODONE BITARTRATE AND ACETAMINOPHEN 1 TABLET: 5; 325 TABLET ORAL at 23:00

## 2018-02-19 RX ADMIN — HYDROCODONE BITARTRATE AND ACETAMINOPHEN 1 TABLET: 5; 325 TABLET ORAL at 14:08

## 2018-02-19 RX ADMIN — LAMOTRIGINE 100 MG: 100 TABLET ORAL at 08:21

## 2018-02-19 RX ADMIN — PANTOPRAZOLE SODIUM 40 MG: 40 TABLET, DELAYED RELEASE ORAL at 05:49

## 2018-02-19 RX ADMIN — CLONAZEPAM 0.5 MG: 0.5 TABLET ORAL at 15:42

## 2018-02-19 RX ADMIN — HEPARIN SODIUM 5000 UNITS: 5000 INJECTION, SOLUTION INTRAVENOUS; SUBCUTANEOUS at 22:59

## 2018-02-19 RX ADMIN — QUETIAPINE 200 MG: 200 TABLET ORAL at 23:00

## 2018-02-19 RX ADMIN — POTASSIUM CHLORIDE 20 MEQ: 1500 TABLET, EXTENDED RELEASE ORAL at 08:21

## 2018-02-19 RX ADMIN — LAMOTRIGINE 100 MG: 100 TABLET ORAL at 17:27

## 2018-02-19 RX ADMIN — CLOBETASOL PROPIONATE: 0.5 CREAM TOPICAL at 17:27

## 2018-02-19 RX ADMIN — QUETIAPINE 400 MG: 400 TABLET, EXTENDED RELEASE ORAL at 08:21

## 2018-02-19 RX ADMIN — CLONAZEPAM 0.5 MG: 0.5 TABLET ORAL at 08:21

## 2018-02-19 NOTE — PROGRESS NOTES
Christopher Suarez  [id number]    56 y o   female      ASSESSMENT  57F with chronic pains on narcotics, h/o Biopolar presenting with dizziness/abd pain/n/v  Lipase 1230 @ adm, now 138  LFTs only w mildly elev alk phos (normal ast/alt/TB)  Noncon CT showing filling defect @ ampulla and dilated panc duct @ 7mm w normal biliary tree  1  Acute Pancreatitis - etiologies include gallstones vs ampullary/panc mass vs med induced? Normal triglycerides  No h/o ETOH  2  Hypokalemia - improved  3  CAM -cr improving    PLAN  1  MRCP scheduled for today at 10:30 a m  2   ERCP pending results of MRCP  3  Diet as tolerated    Chief Complaint   Patient presents with    Vomiting     Patient presents to the ED with vomitting starting tonght  States she has chronic hypokalemia and hasnt taken her potassium since 2/5       SUBJECTIVE/HPI   Denies any abdominal pain, nausea or vomiting      /57 (BP Location: Left arm)   Pulse 83   Temp 97 9 °F (36 6 °C) (Oral)   Resp 18   Ht 5' 7" (1 702 m)   Wt 70 kg (154 lb 5 2 oz)   LMP  (LMP Unknown)   SpO2 99%   BMI 24 17 kg/m²       PHYSICALEXAM  Constitutional:  Well developed, well nourished, no acute distress, non-toxic appearance   Eyes:   conjunctiva normal   HENT:  Atraumatic  Respiratory:  No respiratory distress  Cardiovascular:  Normal rate  GI:  Soft, nondistended, normal bowel sounds, nontender  Musculoskeletal:  No edema  Neurologic:  Alert & oriented x 2      Lab Results   Component Value Date    GLUCOSE 95 02/19/2018    CALCIUM 8 1 (L) 02/19/2018     (H) 02/19/2018    K 5 2 02/19/2018    CO2 21 02/19/2018     (H) 02/19/2018    BUN 17 02/19/2018    CREATININE 1 83 (H) 02/19/2018     Lab Results   Component Value Date    WBC 4 83 02/18/2018    HGB 9 4 (L) 02/18/2018    HCT 31 4 (L) 02/18/2018     (H) 02/18/2018     02/18/2018     Lab Results   Component Value Date    ALT 25 02/18/2018    AST 22 02/18/2018    ALKPHOS 151 (H) 02/18/2018 BILITOT 0 10 (L) 02/18/2018     No results found for: AMYLASE  Lab Results   Component Value Date    LIPASE 138 02/18/2018     Lab Results   Component Value Date    IRON 44 (L) 11/10/2017    TIBC 164 (L) 11/10/2017    FERRITIN 50 11/10/2017     No results found for: INR    Counseling / Coordination of Care  Total floor / unit time spent today 25 minutes  Greater than 50% of total time was spent with the patient and / or family counseling and / or coordination of care  A description of the counseling / coordination of care: 15    Jcarlos Peter

## 2018-02-19 NOTE — PLAN OF CARE
Problem: PAIN - ADULT  Goal: Verbalizes/displays adequate comfort level or baseline comfort level  Interventions:  - Encourage patient to monitor pain and request assistance  - Assess pain using appropriate pain scale  - Administer analgesics based on type and severity of pain and evaluate response  - Implement non-pharmacological measures as appropriate and evaluate response  - Consider cultural and social influences on pain and pain management  - Notify physician/advanced practitioner if interventions unsuccessful or patient reports new pain   Outcome: Progressing      Problem: SAFETY ADULT  Goal: Patient will remain free of falls  INTERVENTIONS:  - Assess patient frequently for physical needs  -  Identify cognitive and physical deficits and behaviors that affect risk of falls  -  [place] fall precautions as indicated by assessment   - Educate patient/family on patient safety including physical limitations  - Instruct patient to call for assistance with activity based on assessment  - Modify environment to reduce risk of injury  - Consider OT/PT consult to assist with strengthening/mobility   Outcome: Progressing      Problem: Knowledge Deficit  Goal: Patient/family/caregiver demonstrates understanding of disease process, treatment plan, medications, and discharge instructions  Complete learning assessment and assess knowledge base    Interventions:  - Provide teaching at level of understanding  - Provide teaching via preferred learning methods   Outcome: Progressing      Problem: METABOLIC, FLUID AND ELECTROLYTES - ADULT  Goal: Electrolytes maintained within normal limits  INTERVENTIONS:  - Monitor labs and assess patient for signs and symptoms of electrolyte imbalances  - Administer electrolyte replacement as ordered  - Monitor response to electrolyte replacements, including repeat lab results as appropriate  - Instruct patient on fluid and nutrition as appropriate   Outcome: Progressing    Goal: Fluid balance maintained  INTERVENTIONS:  - Monitor labs and assess for signs and symptoms of volume excess or deficit  - Monitor I/O and WT  - Instruct patient on fluid and nutrition as appropriate  Outcome: Progressing      Problem: GASTROINTESTINAL - ADULT  Goal: Minimal or absence of nausea and/or vomiting  INTERVENTIONS:  - Administer IV fluids as ordered to ensure adequate hydration  - Maintain NPO status until nausea and vomiting are resolved  - Nasogastric tube as ordered  - Administer ordered antiemetic medications as needed  - Provide nonpharmacologic comfort measures as appropriate  - Advance diet as tolerated, if ordered  - Nutrition services referral to assist patient with adequate nutrition and appropriate food choices  Outcome: Progressing    Goal: Maintains or returns to baseline bowel function  INTERVENTIONS:  - Assess bowel function  - Encourage oral fluids to ensure adequate hydration  - Administer IV fluids as ordered to ensure adequate hydration  - Administer ordered medications as needed  - Encourage mobilization and activity  - Nutrition services referral to assist patient with appropriate food choices  Outcome: Progressing    Goal: Maintains adequate nutritional intake  INTERVENTIONS:  - Monitor percentage of each meal consumed  - Identify factors contributing to decreased intake, treat as appropriate  - Assist with meals as needed  - Monitor I&O, WT and lab values  - Obtain nutrition services referral as needed  Outcome: Progressing      Problem: SKIN/TISSUE INTEGRITY - ADULT  Goal: Skin integrity remains intact  INTERVENTIONS  - Identify patients at risk for skin breakdown  - Assess and monitor skin integrity  - Assess and monitor nutrition and hydration status  - Monitor labs (i e  albumin)  - Assess for incontinence   - Turn and reposition patient  - Assist with mobility/ambulation  - Relieve pressure over bony prominences  - Avoid friction and shearing  - Provide appropriate hygiene as needed including keeping skin clean and dry  - Evaluate need for skin moisturizer/barrier cream  - Collaborate with interdisciplinary team (i e  Nutrition, Rehabilitation, etc )   - Patient/family teaching  Outcome: Progressing      Problem: MUSCULOSKELETAL - ADULT  Goal: Maintain or return mobility to safest level of function  INTERVENTIONS:  - Assess patient's ability to carry out ADLs; assess patient's baseline for ADL function and identify physical deficits which impact ability to perform ADLs (bathing, care of mouth/teeth, toileting, grooming, dressing, etc )  - Assess/evaluate cause of self-care deficits   - Assess range of motion  - Assess patient's mobility; develop plan if impaired  - Assess patient's need for assistive devices and provide as appropriate  - Encourage maximum independence but intervene and supervise when necessary  - Involve family in performance of ADLs  - Assess for home care needs following discharge   - Request OT consult to assist with ADL evaluation and planning for discharge  - Provide patient education as appropriate  Outcome: Progressing      Problem: Nutrition/Hydration-ADULT  Goal: Nutrient/Hydration intake appropriate for improving, restoring or maintaining nutritional needs  Monitor and assess patient's nutrition/hydration status for malnutrition (ex- brittle hair, bruises, dry skin, pale skin and conjunctiva, muscle wasting, smooth red tongue, and disorientation)  Collaborate with interdisciplinary team and initiate plan and interventions as ordered  Monitor patient's weight and dietary intake as ordered or per policy  Utilize nutrition screening tool and intervene per policy  Determine patient's food preferences and provide high-protein, high-caloric foods as appropriate       INTERVENTIONS:  - Monitor oral intake, urinary output, labs, and treatment plans  - Assess nutrition and hydration status and recommend course of action  - Evaluate amount of meals eaten  - Assist patient with eating if necessary   - Allow adequate time for meals  - Recommend/ encourage appropriate diets, oral nutritional supplements, and vitamin/mineral supplements  - Order, calculate, and assess calorie counts as needed  - Recommend, monitor, and adjust tube feedings and TPN/PPN based on assessed needs  - Assess need for intravenous fluids  - Provide specific nutrition/hydration education as appropriate  - Include patient/family/caregiver in decisions related to nutrition  Outcome: Progressing

## 2018-02-19 NOTE — CASE MANAGEMENT
Initial Clinical Review    Admission: Date/Time/Statement: 2/16/18 @ 2347     Orders Placed This Encounter   Procedures    Inpatient Admission (expected length of stay for this patient is greater than two midnights)     Standing Status:   Standing     Number of Occurrences:   1     Order Specific Question:   Admitting Physician     Answer:   Prateek Covarrubias [[id number]]     Order Specific Question:   Level of Care     Answer:   Med Surg [16]     Order Specific Question:   Estimated length of stay     Answer:   More than 2 Midnights     Order Specific Question:   Certification     Answer:   I certify that inpatient services are medically necessary for this patient for a duration of greater than two midnights  See H&P and MD Progress Notes for additional information about the patient's course of treatment  ED: Date/Time/Mode of Arrival:   ED Arrival Information     Expected Arrival Acuity Means of Arrival Escorted By Service Admission Type    2/16/2018 19:32 2/16/2018 19:38 Urgent Ambulance [facility]) General Medicine Urgent    Arrival Complaint    Weakness      Chief Complaint:   Chief Complaint   Patient presents with    Vomiting     Patient presents to the ED with vomitting starting tonght  States she has chronic hypokalemia and hasnt taken her potassium since 2/5   History of Illness:   Patient complains of lightheaded walking upstairs about 1 hour ago after eating salad for dinner  She the vomited food  She received zofran by ambulance and feels better now  She attributed the episode to low potassium as she takes miralax and lactulose daily for constipation and her primary care did not prescribe her potassium supplements since 2/5/18  She denies any constipation or diarrhea currently    ED Vital Signs:   ED Triage Vitals   Temperature Pulse Respirations Blood Pressure SpO2   02/16/18 1955 02/16/18 1939 02/16/18 1939 02/16/18 1945 02/16/18 1939   98 3 °F (36 8 °C) 102 20 143/70 99 %      Temp Source Heart Rate Source Patient Position - Orthostatic VS BP Location FiO2 (%)   02/16/18 1955 02/16/18 1939 02/16/18 1939 02/16/18 1939 --   Tympanic Monitor Sitting Left arm       Pain Score       02/16/18 1939       No Pain        Wt Readings from Last 1 Encounters:   02/18/18 70 kg (154 lb 5 2 oz)   Vital Signs (abnormal):   RR 9  Abnormal Labs/Diagnostic Test Results:   HGB 11 2  K 2 5 Co2 20 ANION GAP 15 BUN 29 CR 2 47 ALK PHOS 198 ALB 2 9 GFR  21 LIPASE 1230  CT A/P=Dilated pancreatic duct with potential filling defect seen at the level of the ampulla within the duodenum  Recommend ERCP for further evaluation  Underlying mass cannot be excluded  Moderate fecal retention with fluid and stool noted throughout the colon may suggest underlying constipation  Distended gallbladder without gallstones    EKG=  Ventricular Rate BPM 98    Atrial Rate BPM 98    MA Interval ms 146    QRSD Interval ms 112    QT Interval ms 458    QTC Interval ms 584    P Axis degrees 86    QRS Axis degrees 109    T Wave Axis degrees 68      Normal sinus rhythm  Pulmonary disease pattern  Incomplete right bundle branch block  Possible Right ventricular hypertrophy  Prolonged QT  Abnormal ECG      ED Treatment:   Medication Administration from 02/16/2018 1932 to 02/17/2018 0050       Date/Time Order Dose Route Action Action by Comments     02/16/2018 1943 ondansetron (ZOFRAN) injection 4 mg 4 mg Intravenous Given Jeovany Drummond RN      02/16/2018 2051 sodium chloride 0 9 % bolus 1,000 mL 0 mL Intravenous Stopped Jeovany Drummond RN      02/16/2018 2015 sodium chloride 0 9 % bolus 1,000 mL 1,000 mL Intravenous [address] Jeovany Drummond RN      02/16/2018 2300 potassium chloride 20 mEq IVPB (premix) 0 mEq Intravenous Stopped Jeovany Drummond RN      02/16/2018 2052 potassium chloride 20 mEq IVPB (premix) 20 mEq Intravenous [address] Jeovany Drummond RN      02/16/2018 2053 potassium chloride (K-DUR,KLOR-CON) CR tablet 40 mEq 40 mEq Oral Given Kelsey L Vivian Jones RN      02/16/2018 2300 sodium chloride 0 9 % infusion 0 mL/hr Intravenous Stopped Asim Nguyen RN      02/16/2018 2030 sodium chloride 0 9 % infusion 125 mL/hr Intravenous New Bag Asim Nguyen RN       Past Medical/Surgical History: Active Ambulatory Problems     Diagnosis Date Noted    CAM (acute kidney injury) ([facility] ) 01/17/2017    Hypokalemia 01/17/2017    Bipolar 2 disorder ([facility] ) 01/17/2017    Hypercholesteremia 01/17/2017    Spondylolisthesis at L5-S1 level 01/20/2017    Chronic kidney disease, stage 3 11/07/2017     Resolved Ambulatory Problems     Diagnosis Date Noted    Rapid weight loss 92/82/2756    Metabolic acidosis, increased anion gap 01/18/2017    Severe protein-calorie malnutrition ([facility] ) 01/19/2017    Prolonged Q-T interval on ECG 11/07/2017    Diarrhea due to laxative abuse 11/08/2017     Past Medical History:   Diagnosis Date    Ankle sprain     Bipolar 2 disorder (HCC)     Chronic back pain     Hypercholesteremia     Hyperlipidemia     Panic attacks    Admitting Diagnosis: Hypokalemia [E87 6]  Pancreatitis [K85 90]  Dilated pancreatic duct [K86 89]  Weakness [R53 1]  Age/Sex: 56 y o  female  Assessment/Plan:   Principal Problem:    Pancreatitis  Active Problems:    CAM (acute kidney injury) ([facility] )    Hypokalemia    Bipolar 2 disorder (HCC)    Hypercholesteremia    Chronic kidney disease, stage 3  Plan:  Admit to med surgical floor on telemetry  · Acute pancreatitis  IV fluids  NPO  Will order right upper quadrant ultrasound  GI input  Will order triglycerides in a m  Pain management  · Hypokalemia  Potassium supplementation  · Acute kidney injury-POA  · Chronic kidney disease stage 3  Avoid nephrotoxins medications/hypotension  IV fluids  Follow-up renal function  · Hyperlipidemia-on Lipitor  · Bipolar disorder-on Seroquel, Lamictal  · DVT prophylaxis  · Discussed with patient in detail  · Anticipated length of stay greater than 2 midnights      Admission Orders:  TELEMETRY  NPO  PT EVAL & TX  CONSULT GI  CONT PULSE OX  O2 TO KEEP SATS>92%  Scheduled Meds:   Current Facility-Administered Medications:  atorvastatin 40 mg Oral Daily With Giacomo Ogden MD    clobetasol  Topical BID Bette Fly, DO    clonazePAM 0 5 mg Oral TID Sina Marmolejo MD    heparin (porcine) 5,000 Units Subcutaneous Q8H Renea Smart MD    HYDROcodone-acetaminophen 1 tablet Oral Q6H PRN Sina Marmolejo MD    lamoTRIgine 100 mg Oral BID Sina Marmolejo MD    ondansetron 4 mg Intravenous Q6H PRN Sina Marmolejo MD    pantoprazole 40 mg Oral Early Morning Sina Marmolejo MD    patient supplied medication 1 each Oral TID Bette Fly, DO    potassium chloride 20 mEq Oral BID Bette Fly, DO    QUEtiapine 400 mg Oral Daily Sina Marmolejo MD    QUEtiapine 200 mg Oral HS Sina Marmolejo MD    sodium chloride 0 9 % with KCl 40 mEq/L 75 mL/hr Intravenous Continuous Bette Fly, DO Last Rate: 75 mL/hr (02/18/18 2153)   traMADol 50 mg Oral Q8H PRN Sina Marmolejo MD      Continuous Infusions:   sodium chloride 0 9 %  125 mL/hr      PRN Meds: HYDROcodone-acetaminophen    ondansetron    traMADol

## 2018-02-19 NOTE — ASSESSMENT & PLAN NOTE
· Gallstones vs ampullar/pancreatic mass vs medication induced     · Denies ETOH use  · TG WNL   · MRCP today, ERCP as an outpatient depending on results  · Diet: increase as tolerated  · Lipase 138 today, 1230 on admission

## 2018-02-19 NOTE — SOCIAL WORK
Met with patient  Explained role of care management  Patient lives in a three story home with her 61year old sister, sisters children and grandchildren  No steps to enter  Her bedroom is on the third floor  She is independent adl's and ambulation, drives, is on SSI disability  DME - denies  Past services - Friends, Jocelyn  She sees her psychiatrist Dr Mary Jo Barrera monthly, sees her therapist every two weeks, sees PCP monthly  She plans on returning home at discharge and does not anticipate any discharge needs  Will follow

## 2018-02-19 NOTE — PROGRESS NOTES
[facility]    Progress Note - Radha Hahn 1960, 56 y o  female MRN: [id number]    Unit/Bed#: [address] [place] [address] Encounter: [id number]    Primary Care Provider: Faizan Garcia   Date and time admitted to hospital: 2018  7:38 PM      * Pancreatitis   Assessment & Plan    · Gallstones vs ampullar/pancreatic mass vs medication induced  · Denies ETOH use  · TG WNL   · MRCP today, ERCP as an outpatient depending on results  · Diet: increase as tolerated  · Lipase 138 today, 1230 on admission        CAM (acute kidney injury) ([facility] )   Assessment & Plan    · Resolved  · Cr 1 83 this AM, patients baseline  · Stop IVF        Chronic kidney disease, stage 3   Assessment & Plan    · Cr 1 83, patient's baseline        Hypokalemia   Assessment & Plan    · K 5 2 today, 3 3 on admission  · STOP K-Sonia, IVF        Hypercholesteremia   Assessment & Plan    · Continue Lipitor 40 mg PO QD  · Lipid Panel:  · Cholesterol: 176  · T  · HLD: 68  · LDL: 83        Bipolar 2 disorder (HCC)   Assessment & Plan    · Continue Home meds:  · Seroquel 200 PO HS  · Seroquel 400 PO QD          VTE Pharmacologic Prophylaxis:   Pharmacologic: Heparin  Mechanical VTE Prophylaxis in Place: No    Patient Centered Rounds: I have performed bedside rounds with nursing staff today  Vinicius    Discussions with Specialists or Other Care Team Provider:  Gastroenterology    Education and Discussions with Family / Patient: Patient    Current Length of Stay: 3 day(s)    Current Patient Status: Inpatient   Certification Statement: The patient will continue to require additional inpatient hospital stay due to pancreatitis and requirement of IVF    Discharge Plan: Discharge once patient lipase is stable pain is controlled    Code Status: Level 1 - Full Code      Subjective:   Patient was seen following the MRCP and has no complaints at this time  She states that she ate earlier today and that is well tolerated    She denies nausea, vomiting, stomach pain  Patient states that she has had a rash on her right upper extremity for approximately 1 month and denies the use of new detergents and new foods  She states that the cream she was given has helped  The patient denies headaches, dizziness, lightheadedness, chest pain, shortness of breath, trouble breathing, palpitations, trouble urinating, numbness or tingling hands or feet  Objective:     Vitals:   Temp (24hrs), Av °F (36 7 °C), Min:97 9 °F (36 6 °C), Max:98 2 °F (36 8 °C)    HR:  [83-90] 83  Resp:  [17-20] 18  BP: (119-128)/(57-60) 124/57  SpO2:  [99 %-100 %] 99 %  Body mass index is 24 17 kg/m²  Input and Output Summary (last 24 hours): Intake/Output Summary (Last 24 hours) at 18 1225  Last data filed at 18 2153   Gross per 24 hour   Intake              815 ml   Output              300 ml   Net              515 ml       Physical Exam:     Physical Exam   Constitutional: She is oriented to person, place, and time  Vital signs are normal  She appears well-developed and well-nourished  Non-toxic appearance  No distress  HENT:   Head: Normocephalic and atraumatic  Mouth/Throat: Oropharynx is clear and moist and mucous membranes are normal  Normal dentition  No oropharyngeal exudate  Eyes: Conjunctivae are normal  Pupils are equal, round, and reactive to light  Right eye exhibits no discharge  Left eye exhibits no discharge  No scleral icterus  Neck: No JVD present  No tracheal deviation and no erythema present  Cardiovascular: Normal rate, regular rhythm, normal heart sounds, intact distal pulses and normal pulses  Exam reveals no gallop and no friction rub  No murmur heard  Pulmonary/Chest: Effort normal and breath sounds normal  No accessory muscle usage or stridor  No respiratory distress  She has no decreased breath sounds  She has no wheezes  She has no rales  Abdominal: Soft  Bowel sounds are normal  She exhibits no distension and no mass  There is no tenderness  There is no rebound  Musculoskeletal: She exhibits no edema, tenderness or deformity  Neurological: She is alert and oriented to person, place, and time  GCS eye subscore is 4  GCS verbal subscore is 5  GCS motor subscore is 6  Skin: Skin is warm and dry  No rash noted  She is not diaphoretic  No erythema  No pallor  Psychiatric: She has a normal mood and affect  Her behavior is normal        Additional Data:     Labs:      Results from last 7 days  Lab Units 02/18/18  0436   WBC Thousand/uL 4 83   HEMOGLOBIN g/dL 9 4*   HEMATOCRIT % 31 4*   PLATELETS Thousands/uL 369   NEUTROS PCT % 57   LYMPHS PCT % 31   MONOS PCT % 8   EOS PCT % 3       Results from last 7 days  Lab Units 02/19/18  0449 02/18/18  0436   SODIUM mmol/L 146* 146*   POTASSIUM mmol/L 5 2 4 6   CHLORIDE mmol/L 118* 119*   CO2 mmol/L 21 18*   BUN mg/dL 17 16   CREATININE mg/dL 1 83* 1 91*   CALCIUM mg/dL 8 1* 8 2*   TOTAL PROTEIN g/dL  --  5 3*   BILIRUBIN TOTAL mg/dL  --  0 10*   ALK PHOS U/L  --  151*   ALT U/L  --  25   AST U/L  --  22   GLUCOSE RANDOM mg/dL 95 93           * I Have Reviewed All Lab Data Listed Above  * Additional Pertinent Lab Tests Reviewed:  All [facility] Admission Reviewed    Imaging:    Imaging Reports Reviewed Today Include:  CT abdomen pelvis without contrast  Imaging Personally Reviewed by Myself Includes:  None    Recent Cultures (last 7 days):           Last 24 Hours Medication List:     Current Facility-Administered Medications:  atorvastatin 40 mg Oral Daily With Pita Ontiveros MD   clobetasol  Topical BID Bette Fly, DO   clonazePAM 0 5 mg Oral TID Daniela Martinez MD   heparin (porcine) 5,000 Units Subcutaneous Q8H Brandi Baires MD   HYDROcodone-acetaminophen 1 tablet Oral Q6H PRN Daniela Martinez MD   lamoTRIgine 100 mg Oral BID Daniela Martinez MD   ondansetron 4 mg Intravenous Q6H PRN Daniela Martinez MD   pantoprazole 40 mg Oral Early Morning Alberta Perez MD   patient supplied medication 1 each Oral TID Betteguido Harp, DO   QUEtiapine 400 mg Oral Daily Alberta Perez MD   QUEtiapine 200 mg Oral HS Alberta Perez MD   traMADol 50 mg Oral Q8H PRN Alberta Perez MD        Today, Patient Was Seen By: Mike Danielson PA-C    ** Please Note: Dictation voice to text software may have been used in the creation of this document   **

## 2018-02-20 VITALS
TEMPERATURE: 97.5 F | HEIGHT: 67 IN | DIASTOLIC BLOOD PRESSURE: 65 MMHG | RESPIRATION RATE: 18 BRPM | SYSTOLIC BLOOD PRESSURE: 139 MMHG | OXYGEN SATURATION: 100 % | HEART RATE: 84 BPM | WEIGHT: 157.41 LBS | BODY MASS INDEX: 24.71 KG/M2

## 2018-02-20 PROBLEM — N17.9 AKI (ACUTE KIDNEY INJURY) (HCC): Status: RESOLVED | Noted: 2017-01-17 | Resolved: 2018-02-20

## 2018-02-20 PROBLEM — K85.90 PANCREATITIS: Status: RESOLVED | Noted: 2018-02-16 | Resolved: 2018-02-20

## 2018-02-20 PROBLEM — E87.6 HYPOKALEMIA: Status: RESOLVED | Noted: 2017-01-17 | Resolved: 2018-02-20

## 2018-02-20 PROBLEM — N28.1 CYST OF RIGHT KIDNEY: Chronic | Status: ACTIVE | Noted: 2018-02-20

## 2018-02-20 LAB
ALBUMIN SERPL BCP-MCNC: 2.3 G/DL (ref 3.5–5)
ALP SERPL-CCNC: 153 U/L (ref 46–116)
ALT SERPL W P-5'-P-CCNC: 32 U/L (ref 12–78)
ANION GAP SERPL CALCULATED.3IONS-SCNC: 10 MMOL/L (ref 4–13)
AST SERPL W P-5'-P-CCNC: 26 U/L (ref 5–45)
BASOPHILS # BLD AUTO: 0.03 THOUSANDS/ΜL (ref 0–0.1)
BASOPHILS NFR BLD AUTO: 1 % (ref 0–1)
BILIRUB SERPL-MCNC: 0.2 MG/DL (ref 0.2–1)
BUN SERPL-MCNC: 19 MG/DL (ref 5–25)
CALCIUM SERPL-MCNC: 8.7 MG/DL (ref 8.3–10.1)
CHLORIDE SERPL-SCNC: 113 MMOL/L (ref 100–108)
CO2 SERPL-SCNC: 22 MMOL/L (ref 21–32)
CREAT SERPL-MCNC: 1.66 MG/DL (ref 0.6–1.3)
EOSINOPHIL # BLD AUTO: 0.16 THOUSAND/ΜL (ref 0–0.61)
EOSINOPHIL NFR BLD AUTO: 4 % (ref 0–6)
ERYTHROCYTE [DISTWIDTH] IN BLOOD BY AUTOMATED COUNT: 14.2 % (ref 11.6–15.1)
GFR SERPL CREATININE-BSD FRML MDRD: 34 ML/MIN/1.73SQ M
GLUCOSE SERPL-MCNC: 83 MG/DL (ref 65–140)
HCT VFR BLD AUTO: 33.3 % (ref 34.8–46.1)
HGB BLD-MCNC: 9.8 G/DL (ref 11.5–15.4)
LIPASE SERPL-CCNC: 146 U/L (ref 73–393)
LYMPHOCYTES # BLD AUTO: 1.4 THOUSANDS/ΜL (ref 0.6–4.47)
LYMPHOCYTES NFR BLD AUTO: 34 % (ref 14–44)
MCH RBC QN AUTO: 30.1 PG (ref 26.8–34.3)
MCHC RBC AUTO-ENTMCNC: 29.4 G/DL (ref 31.4–37.4)
MCV RBC AUTO: 102 FL (ref 82–98)
MONOCYTES # BLD AUTO: 0.49 THOUSAND/ΜL (ref 0.17–1.22)
MONOCYTES NFR BLD AUTO: 12 % (ref 4–12)
NEUTROPHILS # BLD AUTO: 1.99 THOUSANDS/ΜL (ref 1.85–7.62)
NEUTS SEG NFR BLD AUTO: 49 % (ref 43–75)
PLATELET # BLD AUTO: 388 THOUSANDS/UL (ref 149–390)
PMV BLD AUTO: 9.6 FL (ref 8.9–12.7)
POTASSIUM SERPL-SCNC: 4.6 MMOL/L (ref 3.5–5.3)
PROT SERPL-MCNC: 5.4 G/DL (ref 6.4–8.2)
RBC # BLD AUTO: 3.26 MILLION/UL (ref 3.81–5.12)
SODIUM SERPL-SCNC: 145 MMOL/L (ref 136–145)
WBC # BLD AUTO: 4.07 THOUSAND/UL (ref 4.31–10.16)

## 2018-02-20 PROCEDURE — 80053 COMPREHEN METABOLIC PANEL: CPT | Performed by: INTERNAL MEDICINE

## 2018-02-20 PROCEDURE — 83690 ASSAY OF LIPASE: CPT | Performed by: INTERNAL MEDICINE

## 2018-02-20 PROCEDURE — 99239 HOSP IP/OBS DSCHRG MGMT >30: CPT | Performed by: INTERNAL MEDICINE

## 2018-02-20 PROCEDURE — 85025 COMPLETE CBC W/AUTO DIFF WBC: CPT | Performed by: INTERNAL MEDICINE

## 2018-02-20 RX ORDER — HYDROCODONE BITARTRATE AND ACETAMINOPHEN 10; 325 MG/1; MG/1
1 TABLET ORAL EVERY 8 HOURS PRN
Qty: 9 TABLET | Refills: 0 | Status: SHIPPED | OUTPATIENT
Start: 2018-02-20 | End: 2018-02-23

## 2018-02-20 RX ADMIN — CLOBETASOL PROPIONATE: 0.5 CREAM TOPICAL at 08:35

## 2018-02-20 RX ADMIN — CLONAZEPAM 0.5 MG: 0.5 TABLET ORAL at 08:35

## 2018-02-20 RX ADMIN — TRAMADOL HYDROCHLORIDE 50 MG: 50 TABLET, FILM COATED ORAL at 06:49

## 2018-02-20 RX ADMIN — HYDROCODONE BITARTRATE AND ACETAMINOPHEN 1 TABLET: 5; 325 TABLET ORAL at 09:57

## 2018-02-20 RX ADMIN — HEPARIN SODIUM 5000 UNITS: 5000 INJECTION, SOLUTION INTRAVENOUS; SUBCUTANEOUS at 06:49

## 2018-02-20 RX ADMIN — QUETIAPINE 400 MG: 400 TABLET, EXTENDED RELEASE ORAL at 08:35

## 2018-02-20 RX ADMIN — LAMOTRIGINE 100 MG: 100 TABLET ORAL at 08:35

## 2018-02-20 RX ADMIN — PANTOPRAZOLE SODIUM 40 MG: 40 TABLET, DELAYED RELEASE ORAL at 06:49

## 2018-02-20 NOTE — DISCHARGE INSTRUCTIONS
Follow-up with PCP in 1 week  Follow-up with the Gastroenterology for outpatient ERCP  Return to ER with any worsening abdominal pain, nausea, vomiting, chills, fever or any other alarming symptoms

## 2018-02-20 NOTE — ASSESSMENT & PLAN NOTE
· TG unlikely source of pancreatitis  · Continue Lipitor 40 mg PO QD  · Lipid Panel:  · Cholesterol: 176  · T  · HLD: 68  · LDL: 83

## 2018-02-20 NOTE — PLAN OF CARE
Problem: PAIN - ADULT  Goal: Verbalizes/displays adequate comfort level or baseline comfort level  Interventions:  - Encourage patient to monitor pain and request assistance  - Assess pain using appropriate pain scale  - Administer analgesics based on type and severity of pain and evaluate response  - Implement non-pharmacological measures as appropriate and evaluate response  - Consider cultural and social influences on pain and pain management  - Notify physician/advanced practitioner if interventions unsuccessful or patient reports new pain   Outcome: Adequate for Discharge      Problem: SAFETY ADULT  Goal: Patient will remain free of falls  INTERVENTIONS:  - Assess patient frequently for physical needs  -  Identify cognitive and physical deficits and behaviors that affect risk of falls  -  [place] fall precautions as indicated by assessment   - Educate patient/family on patient safety including physical limitations  - Instruct patient to call for assistance with activity based on assessment  - Modify environment to reduce risk of injury  - Consider OT/PT consult to assist with strengthening/mobility   Outcome: Adequate for Discharge      Problem: Knowledge Deficit  Goal: Patient/family/caregiver demonstrates understanding of disease process, treatment plan, medications, and discharge instructions  Complete learning assessment and assess knowledge base    Interventions:  - Provide teaching at level of understanding  - Provide teaching via preferred learning methods   Outcome: Adequate for Discharge      Problem: METABOLIC, FLUID AND ELECTROLYTES - ADULT  Goal: Electrolytes maintained within normal limits  INTERVENTIONS:  - Monitor labs and assess patient for signs and symptoms of electrolyte imbalances  - Administer electrolyte replacement as ordered  - Monitor response to electrolyte replacements, including repeat lab results as appropriate  - Instruct patient on fluid and nutrition as appropriate   Outcome: Adequate for Discharge    Goal: Fluid balance maintained  INTERVENTIONS:  - Monitor labs and assess for signs and symptoms of volume excess or deficit  - Monitor I/O and WT  - Instruct patient on fluid and nutrition as appropriate   Outcome: Adequate for Discharge      Problem: GASTROINTESTINAL - ADULT  Goal: Minimal or absence of nausea and/or vomiting  INTERVENTIONS:  - Administer IV fluids as ordered to ensure adequate hydration  - Maintain NPO status until nausea and vomiting are resolved  - Nasogastric tube as ordered  - Administer ordered antiemetic medications as needed  - Provide nonpharmacologic comfort measures as appropriate  - Advance diet as tolerated, if ordered  - Nutrition services referral to assist patient with adequate nutrition and appropriate food choices   Outcome: Adequate for Discharge    Goal: Maintains or returns to baseline bowel function  INTERVENTIONS:  - Assess bowel function  - Encourage oral fluids to ensure adequate hydration  - Administer IV fluids as ordered to ensure adequate hydration  - Administer ordered medications as needed  - Encourage mobilization and activity  - Nutrition services referral to assist patient with appropriate food choices   Outcome: Adequate for Discharge    Goal: Maintains adequate nutritional intake  INTERVENTIONS:  - Monitor percentage of each meal consumed  - Identify factors contributing to decreased intake, treat as appropriate  - Assist with meals as needed  - Monitor I&O, WT and lab values  - Obtain nutrition services referral as needed   Outcome: Adequate for Discharge      Problem: SKIN/TISSUE INTEGRITY - ADULT  Goal: Skin integrity remains intact  INTERVENTIONS  - Identify patients at risk for skin breakdown  - Assess and monitor skin integrity  - Assess and monitor nutrition and hydration status  - Monitor labs (i e  albumin)  - Assess for incontinence   - Turn and reposition patient  - Assist with mobility/ambulation  - Relieve pressure over bony prominences  - Avoid friction and shearing  - Provide appropriate hygiene as needed including keeping skin clean and dry  - Evaluate need for skin moisturizer/barrier cream  - Collaborate with interdisciplinary team (i e  Nutrition, Rehabilitation, etc )   - Patient/family teaching   Outcome: Adequate for Discharge      Problem: MUSCULOSKELETAL - ADULT  Goal: Maintain or return mobility to safest level of function  INTERVENTIONS:  - Assess patient's ability to carry out ADLs; assess patient's baseline for ADL function and identify physical deficits which impact ability to perform ADLs (bathing, care of mouth/teeth, toileting, grooming, dressing, etc )  - Assess/evaluate cause of self-care deficits   - Assess range of motion  - Assess patient's mobility; develop plan if impaired  - Assess patient's need for assistive devices and provide as appropriate  - Encourage maximum independence but intervene and supervise when necessary  - Involve family in performance of ADLs  - Assess for home care needs following discharge   - Request OT consult to assist with ADL evaluation and planning for discharge  - Provide patient education as appropriate   Outcome: Adequate for Discharge      Problem: Nutrition/Hydration-ADULT  Goal: Nutrient/Hydration intake appropriate for improving, restoring or maintaining nutritional needs  Monitor and assess patient's nutrition/hydration status for malnutrition (ex- brittle hair, bruises, dry skin, pale skin and conjunctiva, muscle wasting, smooth red tongue, and disorientation)  Collaborate with interdisciplinary team and initiate plan and interventions as ordered  Monitor patient's weight and dietary intake as ordered or per policy  Utilize nutrition screening tool and intervene per policy  Determine patient's food preferences and provide high-protein, high-caloric foods as appropriate       INTERVENTIONS:  - Monitor oral intake, urinary output, labs, and treatment plans  - Assess nutrition and hydration status and recommend course of action  - Evaluate amount of meals eaten  - Assist patient with eating if necessary   - Allow adequate time for meals  - Recommend/ encourage appropriate diets, oral nutritional supplements, and vitamin/mineral supplements  - Order, calculate, and assess calorie counts as needed  - Recommend, monitor, and adjust tube feedings and TPN/PPN based on assessed needs  - Assess need for intravenous fluids  - Provide specific nutrition/hydration education as appropriate  - Include patient/family/caregiver in decisions related to nutrition   Outcome: Adequate for Discharge

## 2018-02-20 NOTE — PROGRESS NOTES
Ki Lui  [id number]    56 y o   female      ASSESSMENT  1  Acute pancreatitis  Clinically improving  Non CT scan contrast showing a filling defect at the ampulla and dilated pancreatic duct at 7 mm with normal biliary tree  A subsequent MRCP showed mildly dilated distal common bile duct measuring 10 mm although normal caliber in the mid to proximal portion  No evidence of choledocholithiasis or bile duct stricture  Pancreatic duct also appears dilated the region of the distal head measuring 6-7 mm  No evidence of pancreatic divisum  Unable to exclude ampulla/ampullary lesion and distal common bile ducts  PLAN  1  An ERCP will performed as an outpatient and pending on results an EUS may need to be performed  Chief Complaint   Patient presents with    Vomiting     Patient presents to the ED with vomitting starting tonght  States she has chronic hypokalemia and hasnt taken her potassium since 2/5       SUBJECTIVE/HPI   Postprandial pain this morning, but less than it had been  Denies any nausea or vomiting      /65 (BP Location: Left arm)   Pulse 84   Temp 97 5 °F (36 4 °C) (Oral)   Resp 18   Ht 5' 7" (1 702 m)   Wt 71 4 kg (157 lb 6 5 oz)   LMP  (LMP Unknown)   SpO2 100%   BMI 24 65 kg/m²       PHYSICALEXAM  Constitutional:  no acute distress, non-toxic appearance   Eyes:   conjunctiva normal   HENT:  Atraumatic   Respiratory:  No respiratory distress   Cardiovascular:  Normal rate  GI:  Soft, nondistended, normal bowel sounds, mild tenderness midepigastric right upper quadrant area, no rebound or guarding  Musculoskeletal:  No edema   Neurologic:  Alert & oriented x 3  Psychiatric:  Speech and behavior appropriate       Lab Results   Component Value Date    GLUCOSE 83 02/20/2018    CALCIUM 8 7 02/20/2018     02/20/2018    K 4 6 02/20/2018    CO2 22 02/20/2018     (H) 02/20/2018    BUN 19 02/20/2018    CREATININE 1 66 (H) 02/20/2018     Lab Results   Component Value Date    WBC 4 07 (L) 02/20/2018    HGB 9 8 (L) 02/20/2018    HCT 33 3 (L) 02/20/2018     (H) 02/20/2018     02/20/2018     Lab Results   Component Value Date    ALT 32 02/20/2018    AST 26 02/20/2018    ALKPHOS 153 (H) 02/20/2018    BILITOT 0 20 02/20/2018     No results found for: AMYLASE  Lab Results   Component Value Date    LIPASE 146 02/20/2018     Lab Results   Component Value Date    IRON 44 (L) 11/10/2017    TIBC 164 (L) 11/10/2017    FERRITIN 50 11/10/2017     No results found for: INR    Counseling / Coordination of Care  Total floor / unit time spent today 25 minutes  Greater than 50% of total time was spent with the patient and / or family counseling and / or coordination of care  A description of the counseling / coordination of care: 15    Renato Wells

## 2018-02-20 NOTE — PLAN OF CARE
Problem: PAIN - ADULT  Goal: Verbalizes/displays adequate comfort level or baseline comfort level  Interventions:  - Encourage patient to monitor pain and request assistance  - Assess pain using appropriate pain scale  - Administer analgesics based on type and severity of pain and evaluate response  - Implement non-pharmacological measures as appropriate and evaluate response  - Consider cultural and social influences on pain and pain management  - Notify physician/advanced practitioner if interventions unsuccessful or patient reports new pain   Outcome: Progressing      Problem: SAFETY ADULT  Goal: Patient will remain free of falls  INTERVENTIONS:  - Assess patient frequently for physical needs  -  Identify cognitive and physical deficits and behaviors that affect risk of falls  -  [place] fall precautions as indicated by assessment   - Educate patient/family on patient safety including physical limitations  - Instruct patient to call for assistance with activity based on assessment  - Modify environment to reduce risk of injury  - Consider OT/PT consult to assist with strengthening/mobility   Outcome: Progressing      Problem: Knowledge Deficit  Goal: Patient/family/caregiver demonstrates understanding of disease process, treatment plan, medications, and discharge instructions  Complete learning assessment and assess knowledge base    Interventions:  - Provide teaching at level of understanding  - Provide teaching via preferred learning methods   Outcome: Progressing      Problem: METABOLIC, FLUID AND ELECTROLYTES - ADULT  Goal: Electrolytes maintained within normal limits  INTERVENTIONS:  - Monitor labs and assess patient for signs and symptoms of electrolyte imbalances  - Administer electrolyte replacement as ordered  - Monitor response to electrolyte replacements, including repeat lab results as appropriate  - Instruct patient on fluid and nutrition as appropriate   Outcome: Progressing    Goal: Fluid balance maintained  INTERVENTIONS:  - Monitor labs and assess for signs and symptoms of volume excess or deficit  - Monitor I/O and WT  - Instruct patient on fluid and nutrition as appropriate   Outcome: Progressing      Problem: GASTROINTESTINAL - ADULT  Goal: Minimal or absence of nausea and/or vomiting  INTERVENTIONS:  - Administer IV fluids as ordered to ensure adequate hydration  - Maintain NPO status until nausea and vomiting are resolved  - Nasogastric tube as ordered  - Administer ordered antiemetic medications as needed  - Provide nonpharmacologic comfort measures as appropriate  - Advance diet as tolerated, if ordered  - Nutrition services referral to assist patient with adequate nutrition and appropriate food choices   Outcome: Progressing    Goal: Maintains or returns to baseline bowel function  INTERVENTIONS:  - Assess bowel function  - Encourage oral fluids to ensure adequate hydration  - Administer IV fluids as ordered to ensure adequate hydration  - Administer ordered medications as needed  - Encourage mobilization and activity  - Nutrition services referral to assist patient with appropriate food choices   Outcome: Progressing    Goal: Maintains adequate nutritional intake  INTERVENTIONS:  - Monitor percentage of each meal consumed  - Identify factors contributing to decreased intake, treat as appropriate  - Assist with meals as needed  - Monitor I&O, WT and lab values  - Obtain nutrition services referral as needed   Outcome: Progressing      Problem: SKIN/TISSUE INTEGRITY - ADULT  Goal: Skin integrity remains intact  INTERVENTIONS  - Identify patients at risk for skin breakdown  - Assess and monitor skin integrity  - Assess and monitor nutrition and hydration status  - Monitor labs (i e  albumin)  - Assess for incontinence   - Turn and reposition patient  - Assist with mobility/ambulation  - Relieve pressure over bony prominences  - Avoid friction and shearing  - Provide appropriate hygiene as needed including keeping skin clean and dry  - Evaluate need for skin moisturizer/barrier cream  - Collaborate with interdisciplinary team (i e  Nutrition, Rehabilitation, etc )   - Patient/family teaching   Outcome: Progressing      Problem: MUSCULOSKELETAL - ADULT  Goal: Maintain or return mobility to safest level of function  INTERVENTIONS:  - Assess patient's ability to carry out ADLs; assess patient's baseline for ADL function and identify physical deficits which impact ability to perform ADLs (bathing, care of mouth/teeth, toileting, grooming, dressing, etc )  - Assess/evaluate cause of self-care deficits   - Assess range of motion  - Assess patient's mobility; develop plan if impaired  - Assess patient's need for assistive devices and provide as appropriate  - Encourage maximum independence but intervene and supervise when necessary  - Involve family in performance of ADLs  - Assess for home care needs following discharge   - Request OT consult to assist with ADL evaluation and planning for discharge  - Provide patient education as appropriate   Outcome: Progressing      Problem: Nutrition/Hydration-ADULT  Goal: Nutrient/Hydration intake appropriate for improving, restoring or maintaining nutritional needs  Monitor and assess patient's nutrition/hydration status for malnutrition (ex- brittle hair, bruises, dry skin, pale skin and conjunctiva, muscle wasting, smooth red tongue, and disorientation)  Collaborate with interdisciplinary team and initiate plan and interventions as ordered  Monitor patient's weight and dietary intake as ordered or per policy  Utilize nutrition screening tool and intervene per policy  Determine patient's food preferences and provide high-protein, high-caloric foods as appropriate       INTERVENTIONS:  - Monitor oral intake, urinary output, labs, and treatment plans  - Assess nutrition and hydration status and recommend course of action  - Evaluate amount of meals eaten  - Assist patient with eating if necessary   - Allow adequate time for meals  - Recommend/ encourage appropriate diets, oral nutritional supplements, and vitamin/mineral supplements  - Order, calculate, and assess calorie counts as needed  - Recommend, monitor, and adjust tube feedings and TPN/PPN based on assessed needs  - Assess need for intravenous fluids  - Provide specific nutrition/hydration education as appropriate  - Include patient/family/caregiver in decisions related to nutrition   Outcome: Progressing

## 2018-02-20 NOTE — ASSESSMENT & PLAN NOTE
· Gallstones vs ampullar/pancreatic mass vs medication induced  · Denies ETOH use  · TG WNL   · MRCP: No choledocholithiasis noted, mild CBD dilatation, mild distal pancreatic duct dilatation     · Diet: increase as tolerated, had some mild discomfort with oral intake yesterday  · Lipase 146 today, 1230 on admission  · AST 26, ALT 32,   · ERCP as an outpatient, GI will schedule

## 2018-02-20 NOTE — ASSESSMENT & PLAN NOTE
Right lower pole cyst measuring 2 7 cm x 2 3 cm identified on MRCP  Likely benign, follow up with nephrology, patient aware

## 2018-02-20 NOTE — DISCHARGE SUMMARY
[facility]    Discharge- [facility] 1960, 56 y o  female MRN: [id number]    Unit/Bed#: [address] [place] [address] Encounter: [id number]    Primary Care Provider: Iwona Hurt   Date and time admitted to hospital: 2018  7:38 PM      * Pancreatitis   Assessment & Plan    · Gallstones vs ampullar/pancreatic mass vs medication induced  · Denies ETOH use  · TG WNL   · MRCP: No choledocholithiasis noted, mild CBD dilatation, mild distal pancreatic duct dilatation  · Diet: increase as tolerated, had some mild discomfort with oral intake yesterday  · Lipase 146 today, 1230 on admission  · AST 26, ALT 32,   · ERCP as an outpatient, GI will schedule        [facility] ([facility] ([facility] )   Assessment & Plan    · Resolved  · Cr 1 66 this AM, patients baseline  · IVF D/Cd yesterday along with K supplmenetation        Chronic kidney disease, stage 3   Assessment & Plan    · Cr 1 66, patient's baseline  · D/Cd IVF yesterday along with K supplementation          Hypokalemia   Assessment & Plan    · Resolved  · K 4 6 this AM, 3 3 on admission          Hypercholesteremia   Assessment & Plan    · TG unlikely source of pancreatitis  · Continue Lipitor 40 mg PO QD  · Lipid Panel:  · Cholesterol: 176  · T  · HLD: 68  · LDL: 83        Bipolar 2 disorder (HCC)   Assessment & Plan    · Continue Home meds:  · Seroquel 200 PO HS  · Seroquel 400 PO QD        Cyst of right kidney   Assessment & Plan    Right lower pole cyst measuring 2 7 cm x 2 3 cm identified on MRCP  Likely benign, follow up with nephrology, patient aware              Resolved Problems  Date Reviewed: 2018    None          Consultations During Hospital Stay:  · Gastroenterology    Procedures Performed:     · None    Significant Findings / Test Results:     · CT abdomen and pelvis without contrast:  Dilated pancreatic duct with potential filling defect at the level of the ampulla within the duodenum    Distended gallbladder, no gallstones  · MRCP:  Mild CBD dilatation, mild distal pancreatic duct dilatation at the region of the distal head measuring 6-7 mm, no choledocholithiasis or bile duct stricture noted  No evidence of obstructing mass  · Lipase:  1230 on admission, 146 at discharge      Incidental Findings:   · MRCP:  2 7 x 2 3 cm right lower pole cyst   Small bilateral pleural effusions    Test Results Pending at Discharge (will require follow up): · None     Outpatient Tests Requested:  · ERCP    Complications:  None    Reason for Admission:  Pancreatitis    Hospital Course:     Esteban Zepeda is a 56 y o  female patient who originally presented to the hospital on 2/16/2018 due to vomiting as well as right upper quadrant pain for 2 days  The CT of the abdomen and pelvis showed a pancreatic duct measuring 7 mm and the patient was diagnosed with pancreatitis  The patient was made NPO and pain was controlled  She also found to be hypokalemic and potassium supplementation was given  Upon admission patient also had acute kidney injury and the patient was given IV fluids to lower her creatinine  Gastroenterology was consulted, LFTs and lipase were monitored and the patient was continued on bowel rest   On admission lipase was 1230 and was brought down to 146 the time of discharge  On hospital day 3 a MRCP showed dilated pancreatic duct without masses or strictures  By this time patient began tolerating a diet with a minimal amount of abdominal pain  ERCP will be scheduled by Gastroenterology as an outpatient test     Please see above list of diagnoses and related plan for additional information  Condition at Discharge: good     Discharge Day Visit / Exam:     Subjective:  Patient had just eaten breakfast he and states that she was feeling some mild discomfort after eating  Patient states that this discomfort was at the same level as it was yesterday    She says she has been eating small meals to help with the discomfort  She denies feeling bloated, stomach pain, nausea, vomiting, trouble breathing, shortness of breath, chest pain, palpitations  Vitals: Blood Pressure: 139/65 (02/20/18 0823)  Pulse: 84 (02/20/18 0823)  Temperature: 97 5 °F (36 4 °C) (02/20/18 0823)  Temp Source: Oral (02/20/18 0823)  Respirations: 18 (02/20/18 0823)  Height: 5' 7" (170 2 cm) (02/17/18 0056)  Weight - Scale: 71 4 kg (157 lb 6 5 oz) (02/20/18 0823)  SpO2: 100 % (02/20/18 0823)  Exam:   Physical Exam   Constitutional: She is oriented to person, place, and time  Vital signs are normal  She appears well-developed and well-nourished  Non-toxic appearance  No distress  HENT:   Head: Normocephalic and atraumatic  Mouth/Throat: Oropharynx is clear and moist and mucous membranes are normal  Normal dentition  No oropharyngeal exudate  Eyes: Conjunctivae are normal  Pupils are equal, round, and reactive to light  Right eye exhibits no discharge  Left eye exhibits no discharge  No scleral icterus  Neck: No JVD present  No tracheal deviation and no erythema present  Cardiovascular: Normal rate, regular rhythm, normal heart sounds, intact distal pulses and normal pulses  Exam reveals no gallop and no friction rub  No murmur heard  Pulmonary/Chest: Effort normal and breath sounds normal  No accessory muscle usage or stridor  No respiratory distress  She has no decreased breath sounds  She has no wheezes  She has no rales  Abdominal: Soft  Bowel sounds are normal  She exhibits no distension and no mass  There is tenderness (Mild tenderness to deep palpation in right upper quadrant)  There is no rebound  Musculoskeletal: She exhibits no edema, tenderness or deformity  Neurological: She is alert and oriented to person, place, and time  GCS eye subscore is 4  GCS verbal subscore is 5  GCS motor subscore is 6  Skin: Skin is warm and dry  No rash noted  She is not diaphoretic  No erythema  No pallor     Psychiatric: She has a normal mood and affect  Her behavior is normal      Discussion with Family: None    Discharge instructions/Information to patient and family:   See after visit summary for information provided to patient and family  Provisions for Follow-Up Care:  See after visit summary for information related to follow-up care and any pertinent home health orders  Disposition:     Home    For Discharges to [facility]:   · Not Applicable to this Patient - Not Applicable to this Patient    Planned Readmission: None     Discharge Statement:  I spent 45 minutes discharging the patient  This time was spent on the day of discharge  I had direct contact with the patient on the day of discharge  Greater than 50% of the total time was spent examining patient, answering all patient questions, arranging and discussing plan of care with patient as well as directly providing post-discharge instructions  Additional time then spent on discharge activities  Discharge Medications:  See after visit summary for reconciled discharge medications provided to patient and family        ** Please Note: This note has been constructed using a voice recognition system **

## 2018-02-26 ENCOUNTER — ANESTHESIA EVENT (OUTPATIENT)
Dept: PERIOP | Facility: HOSPITAL | Age: 58
End: 2018-02-26

## 2018-03-01 DIAGNOSIS — N18.30 CHRONIC KIDNEY DISEASE, STAGE III (MODERATE) (HCC): ICD-10-CM

## 2018-03-07 ENCOUNTER — HOSPITAL ENCOUNTER (OUTPATIENT)
Dept: RADIOLOGY | Facility: HOSPITAL | Age: 58
Setting detail: OUTPATIENT SURGERY
Discharge: HOME | End: 2018-03-07
Attending: INTERNAL MEDICINE

## 2018-03-07 ENCOUNTER — ANESTHESIA (OUTPATIENT)
Dept: PERIOP | Facility: HOSPITAL | Age: 58
End: 2018-03-07

## 2018-03-07 DIAGNOSIS — K81.9 CHOLECYSTITIS: ICD-10-CM

## 2018-03-20 ENCOUNTER — APPOINTMENT (OUTPATIENT)
Dept: LAB | Facility: HOSPITAL | Age: 58
End: 2018-03-20
Attending: INTERNAL MEDICINE
Payer: MEDICARE

## 2018-03-20 ENCOUNTER — OFFICE VISIT (OUTPATIENT)
Dept: FAMILY MEDICINE CLINIC | Facility: HOSPITAL | Age: 58
End: 2018-03-20
Payer: MEDICARE

## 2018-03-20 VITALS
SYSTOLIC BLOOD PRESSURE: 148 MMHG | WEIGHT: 149 LBS | BODY MASS INDEX: 23.39 KG/M2 | HEIGHT: 67 IN | HEART RATE: 84 BPM | DIASTOLIC BLOOD PRESSURE: 80 MMHG | RESPIRATION RATE: 14 BRPM

## 2018-03-20 DIAGNOSIS — N18.30 CHRONIC KIDNEY DISEASE, STAGE 3 (HCC): ICD-10-CM

## 2018-03-20 DIAGNOSIS — R09.89 LEFT CAROTID BRUIT: ICD-10-CM

## 2018-03-20 DIAGNOSIS — K86.1 IDIOPATHIC CHRONIC PANCREATITIS (HCC): Primary | ICD-10-CM

## 2018-03-20 DIAGNOSIS — F31.81 BIPOLAR 2 DISORDER (HCC): Chronic | ICD-10-CM

## 2018-03-20 DIAGNOSIS — E87.6 HYPOKALEMIA: ICD-10-CM

## 2018-03-20 DIAGNOSIS — N18.30 CHRONIC KIDNEY DISEASE, STAGE III (MODERATE) (HCC): ICD-10-CM

## 2018-03-20 DIAGNOSIS — R01.1 CARDIAC MURMUR: ICD-10-CM

## 2018-03-20 DIAGNOSIS — M17.11 PRIMARY OSTEOARTHRITIS OF RIGHT KNEE: ICD-10-CM

## 2018-03-20 DIAGNOSIS — E55.9 VITAMIN D DEFICIENCY: ICD-10-CM

## 2018-03-20 DIAGNOSIS — E78.00 HYPERCHOLESTEREMIA: Chronic | ICD-10-CM

## 2018-03-20 PROBLEM — N25.81 SECONDARY RENAL HYPERPARATHYROIDISM (HCC): Status: ACTIVE | Noted: 2017-12-20

## 2018-03-20 PROBLEM — M81.0 AGE RELATED OSTEOPOROSIS: Chronic | Status: ACTIVE | Noted: 2018-03-20

## 2018-03-20 LAB
ALBUMIN SERPL BCP-MCNC: 3.3 G/DL (ref 3.5–5)
ALP SERPL-CCNC: 255 U/L (ref 46–116)
ALT SERPL W P-5'-P-CCNC: 31 U/L (ref 12–78)
ANION GAP SERPL CALCULATED.3IONS-SCNC: 10 MMOL/L (ref 4–13)
AST SERPL W P-5'-P-CCNC: 18 U/L (ref 5–45)
BACTERIA UR QL AUTO: NORMAL /HPF
BASOPHILS # BLD AUTO: 0.04 THOUSANDS/ΜL (ref 0–0.1)
BASOPHILS NFR BLD AUTO: 1 % (ref 0–1)
BILIRUB SERPL-MCNC: 0.2 MG/DL (ref 0.2–1)
BILIRUB UR QL STRIP: NEGATIVE
BUN SERPL-MCNC: 37 MG/DL (ref 5–25)
CALCIUM SERPL-MCNC: 8.8 MG/DL (ref 8.3–10.1)
CHLORIDE SERPL-SCNC: 107 MMOL/L (ref 100–108)
CLARITY UR: CLEAR
CO2 SERPL-SCNC: 27 MMOL/L (ref 21–32)
COLOR UR: COLORLESS
CREAT SERPL-MCNC: 1.92 MG/DL (ref 0.6–1.3)
CREAT UR-MCNC: <13 MG/DL
EOSINOPHIL # BLD AUTO: 0.12 THOUSAND/ΜL (ref 0–0.61)
EOSINOPHIL NFR BLD AUTO: 2 % (ref 0–6)
ERYTHROCYTE [DISTWIDTH] IN BLOOD BY AUTOMATED COUNT: 14.3 % (ref 11.6–15.1)
GFR SERPL CREATININE-BSD FRML MDRD: 29 ML/MIN/1.73SQ M
GLUCOSE SERPL-MCNC: 97 MG/DL (ref 65–140)
GLUCOSE UR STRIP-MCNC: NEGATIVE MG/DL
HCT VFR BLD AUTO: 35.3 % (ref 34.8–46.1)
HGB BLD-MCNC: 10.6 G/DL (ref 11.5–15.4)
HGB UR QL STRIP.AUTO: NEGATIVE
KETONES UR STRIP-MCNC: NEGATIVE MG/DL
LEUKOCYTE ESTERASE UR QL STRIP: NEGATIVE
LYMPHOCYTES # BLD AUTO: 1.17 THOUSANDS/ΜL (ref 0.6–4.47)
LYMPHOCYTES NFR BLD AUTO: 21 % (ref 14–44)
MAGNESIUM SERPL-MCNC: 2.1 MG/DL (ref 1.6–2.6)
MCH RBC QN AUTO: 30.5 PG (ref 26.8–34.3)
MCHC RBC AUTO-ENTMCNC: 30 G/DL (ref 31.4–37.4)
MCV RBC AUTO: 102 FL (ref 82–98)
MONOCYTES # BLD AUTO: 0.41 THOUSAND/ΜL (ref 0.17–1.22)
MONOCYTES NFR BLD AUTO: 7 % (ref 4–12)
NEUTROPHILS # BLD AUTO: 3.8 THOUSANDS/ΜL (ref 1.85–7.62)
NEUTS SEG NFR BLD AUTO: 69 % (ref 43–75)
NITRITE UR QL STRIP: NEGATIVE
NON-SQ EPI CELLS URNS QL MICRO: NORMAL /HPF
PH UR STRIP.AUTO: 6 [PH] (ref 4.5–8)
PHOSPHATE SERPL-MCNC: 3.4 MG/DL (ref 2.7–4.5)
PLATELET # BLD AUTO: 272 THOUSANDS/UL (ref 149–390)
PMV BLD AUTO: 9.7 FL (ref 8.9–12.7)
POTASSIUM SERPL-SCNC: 3.9 MMOL/L (ref 3.5–5.3)
PROT SERPL-MCNC: 6.6 G/DL (ref 6.4–8.2)
PROT UR STRIP-MCNC: NEGATIVE MG/DL
PROT UR-MCNC: 11 MG/DL
PROT/CREAT UR: >0.85 MG/G{CREAT} (ref 0–0.1)
PTH-INTACT SERPL-MCNC: 149.3 PG/ML (ref 18.4–80.1)
RBC # BLD AUTO: 3.47 MILLION/UL (ref 3.81–5.12)
RBC #/AREA URNS AUTO: NORMAL /HPF
SODIUM SERPL-SCNC: 144 MMOL/L (ref 136–145)
SP GR UR STRIP.AUTO: <=1.005 (ref 1–1.03)
UROBILINOGEN UR QL STRIP.AUTO: 0.2 E.U./DL
WBC # BLD AUTO: 5.54 THOUSAND/UL (ref 4.31–10.16)
WBC #/AREA URNS AUTO: NORMAL /HPF

## 2018-03-20 PROCEDURE — 82570 ASSAY OF URINE CREATININE: CPT

## 2018-03-20 PROCEDURE — 81001 URINALYSIS AUTO W/SCOPE: CPT

## 2018-03-20 PROCEDURE — 84156 ASSAY OF PROTEIN URINE: CPT

## 2018-03-20 PROCEDURE — 99214 OFFICE O/P EST MOD 30 MIN: CPT | Performed by: INTERNAL MEDICINE

## 2018-03-20 PROCEDURE — 36415 COLL VENOUS BLD VENIPUNCTURE: CPT

## 2018-03-20 PROCEDURE — 83735 ASSAY OF MAGNESIUM: CPT

## 2018-03-20 PROCEDURE — 85025 COMPLETE CBC W/AUTO DIFF WBC: CPT

## 2018-03-20 PROCEDURE — 80053 COMPREHEN METABOLIC PANEL: CPT

## 2018-03-20 PROCEDURE — 84100 ASSAY OF PHOSPHORUS: CPT

## 2018-03-20 PROCEDURE — 83970 ASSAY OF PARATHORMONE: CPT

## 2018-03-20 RX ORDER — LACTULOSE 10 G/15ML
10 SOLUTION ORAL
COMMUNITY
End: 2018-07-05

## 2018-03-20 RX ORDER — LAMOTRIGINE 100 MG/1
400 TABLET ORAL
COMMUNITY
End: 2020-06-17 | Stop reason: SDUPTHER

## 2018-03-20 RX ORDER — POLYETHYLENE GLYCOL 3350 17 G/17G
17 POWDER, FOR SOLUTION ORAL DAILY
COMMUNITY
End: 2019-01-02 | Stop reason: SDUPTHER

## 2018-03-20 RX ORDER — FLUVOXAMINE MALEATE 100 MG
100 TABLET ORAL
COMMUNITY
End: 2018-07-19 | Stop reason: CLARIF

## 2018-03-20 RX ORDER — FLUVOXAMINE MALEATE 50 MG/1
50 TABLET, COATED ORAL
COMMUNITY
End: 2018-07-19 | Stop reason: CLARIF

## 2018-03-20 NOTE — ASSESSMENT & PLAN NOTE
Had labs this am- increased crt from 1 6 last month top 1 92   3/20/18  8:11 AM 2/20/18  5:16 AM 2/19/18  4:49 AM 2/18/18  4:36 AM 2/17/18  5:55 AM      Sodium 136 - 145 mmol/L 144  145  146   146   147      Potassium 3 5 - 5 3 mmol/L 3 9  4 6  5 2  4 6  3 3      Chloride 100 - 108 mmol/L 107  113   118   119   118      CO2 21 - 32 mmol/L 27  22  21  18   17      Anion Gap 4 - 13 mmol/L 10  10  7  9  12     BUN 5 - 25 mg/dL 37   19  17  16  22     Creatinine 0 60 - 1 30 mg/dL 1 92   1 66CM   1 83CM   1 91CM   2 20CM     Comments: Standardized to IDMS reference method    Glucose 65 - 140 mg/dL 97

## 2018-03-20 NOTE — ASSESSMENT & PLAN NOTE
Continue on atorvastatin  Feb 2018 lipid  Cholesterol 50 - 200 mg/dL 176     Comments:    Cholesterol:       Desirable         <200 mg/dl       Borderline         200-239 mg/dl       High              >239          Triglycerides <=150 mg/dL 124     Comments: Specimen collection should occur prior to N-Acetylcysteine or Metamizole administration due to the potential for falsely depressed results  HDL, Direct 40 - 60 mg/dL 68   H    Comments:    HDL Cholesterol:       High    >59 mg/dL       Low     <41 mg/dL   LDL Calculated 0 - 100 mg/dL 83     Comments: This screening LDL is a calculated result  It does not have the accuracy of the Direct Measured LDL in the monitoring of patients with hyperlipidemia and/or statin therapy  Direct Measure LDL (HDE348) must be ordered separately in these patients     Narrative        Repeat in august

## 2018-03-20 NOTE — PROGRESS NOTES
Assessment/Plan:             Problem List Items Addressed This Visit        Digestive    Idiopathic chronic pancreatitis ([address] ) - Primary     For ercp next week with GI            Musculoskeletal and Integument    Primary osteoarthritis of right knee     Some patellar tendon area pain with some creaking when bending            Genitourinary    Chronic kidney disease, stage 3     Had labs this am- increased crt from 1 6 last month top 1 92   3/20/18  8:11 AM 2/20/18  5:16 AM 2/19/18  4:49 AM 2/18/18  4:36 AM 2/17/18  5:55 AM      Sodium 136 - 145 mmol/L 144  145  146   146   147      Potassium 3 5 - 5 3 mmol/L 3 9  4 6  5 2  4 6  3 3      Chloride 100 - 108 mmol/L 107  113   118   119   118      CO2 21 - 32 mmol/L 27  22  21  18   17      Anion Gap 4 - 13 mmol/L 10  10  7  9  12     BUN 5 - 25 mg/dL 37   19  17  16  22     Creatinine 0 60 - 1 30 mg/dL 1 92   1 66CM   1 83CM   1 91CM   2 20CM     Comments: Standardized to IDMS reference method    Glucose 65 - 140 mg/dL 97                      Other    Bipolar 2 disorder (HCC) (Chronic)     Now on generic meds with her psychiatrist in [facility]         Relevant Medications    fluvoxaMINE (LUVOX) 100 mg tablet    fluvoxaMINE (LUVOX) 50 mg tablet    Hypercholesteremia (Chronic)     Continue on atorvastatin  Feb 2018 lipid  Cholesterol 50 - 200 mg/dL 176     Comments:    Cholesterol:       Desirable         <200 mg/dl       Borderline         200-239 mg/dl       High              >239          Triglycerides <=150 mg/dL 124     Comments: Specimen collection should occur prior to N-Acetylcysteine or Metamizole administration due to the potential for falsely depressed results  HDL, Direct 40 - 60 mg/dL 68   H    Comments:    HDL Cholesterol:       High    >59 mg/dL       Low     <41 mg/dL   LDL Calculated 0 - 100 mg/dL 83     Comments: This screening LDL is a calculated result     It does not have the accuracy of the Direct Measured LDL in the monitoring of patients with hyperlipidemia and/or statin therapy  Direct Measure LDL (VTP782) must be ordered separately in these patients  Narrative        Repeat in august         Vitamin D deficiency            Subjective:      Patient ID: Lee Wang is a 56 y o  female    New pt in office- we had cared for her in hospital last year for hypokalemia with dehydration with laxative issues  Then had admit in January with robert and ckd stage 3  Most  Recent admit with pancreatitis and low potassium- now is stopped using laxatives  Had mrcp and is scheduled for scope with gi next week  MRi done in feb showed some pleural effusion bilaterally- during  Time of pancreatitis  No complaints of sob        The following portions of the patient's history were reviewed and updated as appropriate: allergies, current medications and problem list      Review of Systems   Constitutional: Negative for chills and fever  HENT: Negative for congestion, ear pain, postnasal drip and sore throat  Right nares sore with some occasional bleeding  Never seen by ent   Eyes:        Wears glasses- seen recently with rudi quezada- mother had hx of glaucoma- had not been seen in 10 years   Respiratory: Negative for cough and shortness of breath  Cardiovascular: Negative for chest pain and palpitations  Gastrointestinal: Positive for constipation  Neurological: Negative for dizziness  Psychiatric/Behavioral:        Sees physician in [place] for many years   All other systems reviewed and are negative          Objective:      Current Outpatient Prescriptions:     atorvastatin (LIPITOR) 40 mg tablet, Take 1 tablet by mouth daily, Disp: , Rfl:     Calcium Carbonate-Vit D-Min (CALCIUM 1200 PO), Take 1 tablet by mouth 2 (two) times a day, Disp: , Rfl:     Cholecalciferol (VITAMIN D3) 2000 units capsule, Take by mouth daily  , Disp: , Rfl:     clonazePAM (KLONOPIN) 0 5 mg tablet, Take by mouth 3 (three) times a day  , Disp: , Rfl:    fluvoxaMINE (LUVOX) 100 mg tablet, Take 100 mg by mouth daily at bedtime Also takes a 50 mg tab, Disp: , Rfl:     fluvoxaMINE (LUVOX) 50 mg tablet, Take 50 mg by mouth daily at bedtime, Disp: , Rfl:     lactulose (CHRONULAC) 10 g/15 mL solution, Take 10 g by mouth 15 ml daily, Disp: , Rfl:     lamoTRIgine (LaMICtal) 100 mg tablet, Take 100 mg by mouth 3 (three) times a day Takes at hs, Disp: , Rfl:     lubiprostone (AMITIZA) 24 mcg capsule, Take 24 mcg by mouth 2 (two) times a day with meals, Disp: , Rfl:     multivitamin (THERAGRAN) TABS, Take 1 tablet by mouth daily, Disp: , Rfl:     omeprazole (PriLOSEC) 40 MG capsule, Take 40 mg by mouth daily at bedtime  , Disp: , Rfl:     polyethylene glycol (MIRALAX) 17 g packet, Take 17 g by mouth daily, Disp: , Rfl:     potassium chloride (KLOR-CON M20) 20 mEq tablet, Take 1 tablet by mouth 2 (two) times a day, Disp: , Rfl:     QUEtiapine (SEROQUEL XR) 400 mg 24 hr tablet, Take by mouth daily at bedtime  , Disp: , Rfl:     QUEtiapine (SEROQUEL) 300 mg tablet, Take 1 tablet by mouth daily, Disp: , Rfl:     /72   Pulse 90   Resp 16   Ht 5' 3" (1 6 m)   Wt 65 3 kg (144 lb)   BMI 25 51 kg/m²          Physical Exam   Constitutional: She is oriented to person, place, and time  She appears well-developed and well-nourished  No distress  HENT:   Right Ear: External ear normal    Left Ear: External ear normal    Mouth/Throat: Oropharynx is clear and moist    Eyes: Conjunctivae are normal  Right eye exhibits no discharge  Left eye exhibits no discharge  No scleral icterus  Neck: Neck supple  No thyromegaly present  Cardiovascular: Normal rate and regular rhythm  Exam reveals no gallop  Murmur heard  Grad e 2 /6 systolic murmer some radiation to left carotid vs left bruit   Pulmonary/Chest: Breath sounds normal  No respiratory distress  She has no wheezes  She has no rales  Abdominal: Soft  She exhibits no distension  There is no tenderness  Musculoskeletal: She exhibits tenderness  She exhibits no edema  Mild tenderness over right knee patellar tendon- no swelling   Lymphadenopathy:     She has no cervical adenopathy  Neurological: She is alert and oriented to person, place, and time  No cranial nerve deficit  She exhibits normal muscle tone  Skin: Skin is warm and dry  No rash noted  Psychiatric: She has a normal mood and affect  Her behavior is normal  Judgment and thought content normal    No pressured speech  Nursing note and vitals reviewed

## 2018-03-27 ENCOUNTER — OFFICE VISIT (OUTPATIENT)
Dept: NEPHROLOGY | Facility: HOSPITAL | Age: 58
End: 2018-03-27
Payer: MEDICARE

## 2018-03-27 VITALS
DIASTOLIC BLOOD PRESSURE: 82 MMHG | HEIGHT: 67 IN | WEIGHT: 145 LBS | HEART RATE: 80 BPM | BODY MASS INDEX: 22.76 KG/M2 | SYSTOLIC BLOOD PRESSURE: 122 MMHG

## 2018-03-27 DIAGNOSIS — N28.1 CYST OF RIGHT KIDNEY: Chronic | ICD-10-CM

## 2018-03-27 DIAGNOSIS — E87.6 HYPOKALEMIA: ICD-10-CM

## 2018-03-27 DIAGNOSIS — N25.81 SECONDARY RENAL HYPERPARATHYROIDISM (HCC): ICD-10-CM

## 2018-03-27 DIAGNOSIS — R80.9 PROTEINURIA: ICD-10-CM

## 2018-03-27 DIAGNOSIS — N18.30 CHRONIC KIDNEY DISEASE, STAGE 3 (HCC): Primary | ICD-10-CM

## 2018-03-27 DIAGNOSIS — F31.81 BIPOLAR 2 DISORDER (HCC): Chronic | ICD-10-CM

## 2018-03-27 PROCEDURE — 99214 OFFICE O/P EST MOD 30 MIN: CPT | Performed by: INTERNAL MEDICINE

## 2018-03-27 NOTE — PROGRESS NOTES
OFFICE FOLLOW UP - Nephrology   Ananya Gordillo 56 y o  female MRN: [id number]    Encounter: [id number]        ASSESSMENT and PLAN:  Diagnoses and all orders for this visit:    Chronic kidney disease, stage 3  -Creatinine has remained between 1 7 and 1 9  - she is currently not on any nephrotoxic agent  - recently had an episode of pancreatitis and she could have some mild volume depletion  - a urinalysis was negative but a urine protein to creatinine ratio was positive  - will repeat blood work in 2-3 weeks to make sure her renal function is stable    Bipolar 2 disorder ([facility] )  - she was on lithium in her 23 and may have some chronic interstitial nephritis associated with the she also has some mild hypernatremia  - she is currently being treated with Seroquel    Cyst of right kidney  - she has had recent MRIs and CT scan will repeat a renal ultrasound in about 1 year to make sure that this is stable    Secondary renal hyperparathyroidism ([facility] )  - she has a PTH around 150  - she has not been taking her vitamin-D supplement have asked her to restart this and take regularly  - she is currently on vitamin D3 2000 units daily will recheck in about 3-4    Proteinuria  - she has a positive urine protein to creatinine ratio but a negative urinalysis she is also anemic with CKD  She was on lithium several years ago she also has had acute kidney injury from chronic lactulose  Use  - will check a limited serologic workup that includes an serum protein electrophoresis and urine protein electrophoresis in 2 weeks  - will make further recommendations at that point    hypokalemia  - this was thought to be due to chronic 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 abuse  - she is not any lactulose now  - currently on 20 mEq of potassium chloride daily and stable at 3 9    HPI: Ananya Gordillo is a 56 y o  female who is here for No chief complaint on file  Since our last visit, there has been no ER visits or hospitilizations   Patient currently has no complaints at this time and is feeling well  Patient denies any chest pain, shortness of breath and swelling  The last blood work was done on  March, which we have reviewed together  she had a recent hospitalization is getting an ERCP by GI to evaluate choledocholithiasis in April    she currently denies any chest pain tolerating p  O  Intake well no fevers or chills no nausea vomiting diarrhea or constipation her urine output has been stable her weight is been stable her blood work was reviewed with her today    ROS:   All the systems were reviewed and were negative except as documented on the HPI  Allergies: Patient has no known allergies      Medications:   Current Outpatient Prescriptions:     atorvastatin (LIPITOR) 40 mg tablet, Take 1 tablet by mouth daily, Disp: , Rfl:     Cholecalciferol (VITAMIN D3) 2000 units capsule, Take by mouth daily  , Disp: , Rfl:     clonazePAM (KLONOPIN) 0 5 mg tablet, Take by mouth 3 (three) times a day  , Disp: , Rfl:     fluvoxaMINE (LUVOX) 100 mg tablet, Take 100 mg by mouth daily at bedtime Also takes a 50 mg tab, Disp: , Rfl:     fluvoxaMINE (LUVOX) 50 mg tablet, Take 50 mg by mouth daily at bedtime, Disp: , Rfl:     lactulose (CHRONULAC) 10 g/15 mL solution, Take 10 g by mouth 15 ml daily, Disp: , Rfl:     lamoTRIgine (LaMICtal) 100 mg tablet, Take 100 mg by mouth 3 (three) times a day Takes at hs, Disp: , Rfl:     lubiprostone (AMITIZA) 24 mcg capsule, Take 24 mcg by mouth 2 (two) times a day with meals, Disp: , Rfl:     omeprazole (PriLOSEC) 40 MG capsule, Take 40 mg by mouth daily at bedtime  , Disp: , Rfl:     polyethylene glycol (MIRALAX) 17 g packet, Take 17 g by mouth daily, Disp: , Rfl:     potassium chloride (KLOR-CON M20) 20 mEq tablet, Take 1 tablet by mouth 2 (two) times a day, Disp: , Rfl:     QUEtiapine (SEROQUEL XR) 400 mg 24 hr tablet, Take by mouth daily at bedtime  , Disp: , Rfl:     QUEtiapine (SEROQUEL) 300 mg tablet, Take 1 tablet by mouth daily, Disp: , Rfl:     Calcium Carbonate-Vit D-Min (CALCIUM 1200 PO), Take 1 tablet by mouth 2 (two) times a day, Disp: , Rfl:     multivitamin (THERAGRAN) TABS, Take 1 tablet by mouth daily, Disp: , Rfl:     Past Medical History:   Diagnosis Date    Ankle sprain     left    Anxiety disorder     Bipolar 2 disorder (HCC)     Chronic back pain     CKD (chronic kidney disease) stage 3, GFR 30-59 ml/min     Depression     Hypercholesteremia     Hyperlipidemia     Hypernatremia     Hypokalemia     Intervertebral disc disorder with radiculopathy of lumbosacral region     resolved: 2015    Panic attacks     Radiculitis     resolved: 2015    Secondary renal hyperparathyroidism ([facility] [address] )     Vitamin D deficiency      Past Surgical History:   Procedure Laterality Date    DILATION AND CURETTAGE OF UTERUS      INDUCED       surgically induced    TUBAL LIGATION Bilateral      Family History   Problem Relation Age of Onset    Bipolar disorder Mother     Heart disease Father     Hypertension Father     Other Family      Back disorder    Diabetes Family     Heart disease Family     Hypertension Family     Breast cancer Family     Stroke Family     Thyroid disease Family       reports that she has never smoked  She has never used smokeless tobacco  She reports that she does not drink alcohol or use drugs  Physical Exam:   Vitals:    18 0928   BP: 122/82   BP Location: Left arm   Patient Position: Sitting   Cuff Size: Adult   Pulse: 80   Weight: 65 8 kg (145 lb)   Height: 5' 6 5" (1 689 m)     Body mass index is 23 05 kg/m²      General: conscious, cooperative, in not acute distress  Eyes: conjunctivae pink, anicteric sclerae  ENT: lips and mucous membranes moist  Neck: supple, no JVD  Chest: clear breath sounds bilateral, no crackles, ronchus or wheezings  CVS: distinct S1 & S2, normal rate, regular rhythm  Abdomen: soft, non-tender, non-distended, normoactive bowel sounds  Extremities: no edema of both legs  Skin: no rash  Neuro: awake, alert, oriented      Lab Results:          Results for orders placed or performed in visit on 03/20/18   Comprehensive metabolic panel   Result Value Ref Range    Sodium 144 136 - 145 mmol/L    Potassium 3 9 3 5 - 5 3 mmol/L    Chloride 107 100 - 108 mmol/L    CO2 27 21 - 32 mmol/L    Anion Gap 10 4 - 13 mmol/L    BUN 37 (H) 5 - 25 mg/dL    Creatinine 1 92 (H) 0 60 - 1 30 mg/dL    Glucose 97 65 - 140 mg/dL    Calcium 8 8 8 3 - 10 1 mg/dL    AST 18 5 - 45 U/L    ALT 31 12 - 78 U/L    Alkaline Phosphatase 255 (H) 46 - 116 U/L    Total Protein 6 6 6 4 - 8 2 g/dL    Albumin 3 3 (L) 3 5 - 5 0 g/dL    Total Bilirubin 0 20 0 20 - 1 00 mg/dL    eGFR 29 ml/min/1 73sq m   CBC and differential   Result Value Ref Range    WBC 5 54 4 31 - 10 16 Thousand/uL    RBC 3 47 (L) 3 81 - 5 12 Million/uL    Hemoglobin 10 6 (L) 11 5 - 15 4 g/dL    Hematocrit 35 3 34 8 - 46 1 %     (H) 82 - 98 fL    MCH 30 5 26 8 - 34 3 pg    MCHC 30 0 (L) 31 4 - 37 4 g/dL    RDW 14 3 11 6 - 15 1 %    MPV 9 7 8 9 - 12 7 fL    Platelets 085 101 - 335 Thousands/uL    Neutrophils Relative 69 43 - 75 %    Lymphocytes Relative 21 14 - 44 %    Monocytes Relative 7 4 - 12 %    Eosinophils Relative 2 0 - 6 %    Basophils Relative 1 0 - 1 %    Neutrophils Absolute 3 80 1 85 - 7 62 Thousands/µL    Lymphocytes Absolute 1 17 0 60 - 4 47 Thousands/µL    Monocytes Absolute 0 41 0 17 - 1 22 Thousand/µL    Eosinophils Absolute 0 12 0 00 - 0 61 Thousand/µL    Basophils Absolute 0 04 0 00 - 0 10 Thousands/µL   Magnesium   Result Value Ref Range    Magnesium 2 1 1 6 - 2 6 mg/dL   Phosphorus   Result Value Ref Range    Phosphorus 3 4 2 7 - 4 5 mg/dL   PTH, intact   Result Value Ref Range     3 (H) 18 4 - 80 1 pg/mL   Protein / creatinine ratio, urine   Result Value Ref Range    Creatinine, Ur <13 0 mg/dL    Protein Urine Random 11 mg/dL    Prot/Creat Ratio, Ur >0 85 (H) 0 00 - 0 10 Urinalysis with microscopic   Result Value Ref Range    Clarity, UA Clear     Color, UA Colorless     Specific Gravity, UA <=1 005 1 003 - 1 030    pH, UA 6 0 4 5 - 8 0    Glucose, UA Negative Negative mg/dl    Ketones, UA Negative Negative mg/dl    Blood, UA Negative Negative    Protein, UA Negative Negative mg/dl    Nitrite, UA Negative Negative    Bilirubin, UA Negative Negative    Urobilinogen, UA 0 2 0 2, 1 0 E U /dl E U /dl    Leukocytes, UA Negative Negative    WBC, UA None Seen None Seen, 0-5, 5-55, 5-65 /hpf    RBC, UA None Seen None Seen, 0-5 /hpf    Bacteria, UA None Seen None Seen, Occasional /hpf    Epithelial Cells None Seen None Seen, Occasional /hpf         Portions of the record may have been created with voice recognition software  Occasional wrong word or "sound a like" substitutions may have occurred due to the inherent limitations of voice recognition software  Read the chart carefully and recognize, using context, where substitutions have occurred  If you have any questions, please contact the dictating provider

## 2018-03-27 NOTE — PATIENT INSTRUCTIONS
Chronic Kidney Disease   WHAT YOU NEED TO KNOW:   Chronic kidney disease (CKD) is the gradual and permanent loss of kidney function  It is also called chronic kidney failure, or chronic renal insufficiency  Normally, the kidneys remove fluid, chemicals, and waste from your blood  These wastes are turned into urine by your kidneys  CKD may worsen over time and lead to kidney failure  DISCHARGE INSTRUCTIONS:   Return to the emergency department if:   · You are confused and very drowsy  · You have a seizure  · You have shortness of breath  Contact your healthcare provider if:   · You suddenly gain or lose more weight than your healthcare provider has told you is okay  · You have itchy skin or a rash  · You urinate more or less than you normally do  · You have blood in your urine  · You have nausea and repeated vomiting  · You have fatigue or muscle weakness  · You have hiccups that will not stop  · You have questions or concerns about your condition or care  Medicines:   · Medicines  may be given to decrease blood pressure and get rid of extra fluid  You may also receive medicine to manage health conditions that may occur with CKD, such as anemia, diabetes, and heart disease  · Take your medicine as directed  Contact your healthcare provider if you think your medicine is not helping or if you have side effects  Tell him or her if you are allergic to any medicine  Keep a list of the medicines, vitamins, and herbs you take  Include the amounts, and when and why you take them  Bring the list or the pill bottles to follow-up visits  Carry your medicine list with you in case of an emergency  Follow up with your healthcare provider as directed: You will need to return for tests to monitor your kidney function  You may also be referred to a kidney specialist  Write down your questions so you remember to ask them during your visits  Manage other health conditions:   Follow your healthcare provider's directions on how to manage diabetes, high blood pressure, and heart disease  These conditions can make CKD worse  Talk to your healthcare provider before you take over-the-counter medicine  Medicines such as NSAIDs, stomach medicine, or laxatives may harm your kidneys  Weigh yourself daily:  Ask your healthcare provider what your weight should be  Ask how much liquid you should drink each day  CKD may cause you to gain or lose weight rapidly  Weigh yourself every day  Write down your weight, how much liquid you drink or eat, and how much you urinate each day  Contact your healthcare provider if your weight is higher or lower than it should be  Manage CKD:   · Maintain a healthy weight  Ask your healthcare provider how much you should weigh  Ask him to help you create a weight loss plan if you are overweight  · Exercise 30 to 60 minutes a day, 4 to 7 times a week, or as directed  Ask about the best exercise plan for you  Regular exercise can help you manage CKD, high blood pressure, and diabetes  · Follow your healthcare provider's advice about what to eat and drink  He may tell you to eat food low in sodium (salt), potassium, phosphorus, or protein  You may need to see a dietitian if you need help planning meals  Ask how much liquid to drink each day and which liquids are best for you  · Limit alcohol  Ask how much alcohol is safe for you to drink  A drink of alcohol is 12 ounces of beer, 5 ounces of wine, or 1½ ounces of liquor  · Do not smoke  Nicotine and other chemicals in cigarettes and cigars can cause lung and kidney damage  Ask your healthcare provider for information if you currently smoke and need help to quit  E-cigarettes or smokeless tobacco still contain nicotine  Talk to your healthcare provider before you use these products  · Ask your healthcare provider if you need vaccines    Infections such as pneumonia, influenza, and hepatitis can be more harmful or more likely to occur in a person who has CKD  Vaccines reduce your risk of infection with these viruses  © 2017 [facility] Information is for End User's use only and may not be sold, redistributed or otherwise used for commercial purposes  All illustrations and images included in CareNotes® are the copyrighted property of A D A M [employer] Inc  or [employer]  The above information is an  only  It is not intended as medical advice for individual conditions or treatments  Talk to your doctor, nurse or pharmacist before following any medical regimen to see if it is safe and effective for you

## 2018-03-27 NOTE — LETTER
March 27, 2018     Matthew Oakley, [address]  [address] [address]    Patient: Sho Chou   YOB: 1960   Date of Visit: 3/27/2018       Dear Dr Iman Carcamo: Thank you for referring Maranda Shea to me for evaluation  Below are my notes for this consultation  If you have questions, please do not hesitate to call me  I look forward to following your patient along with you  Sincerely,        Lydia Troncoso DO        CC: No Recipients  Lydia Troncoso DO  3/27/2018 12:05 PM  Sign at close encounter  OFFICE FOLLOW UP - Nephrology   Sho Chou 56 y o  female MRN: [id number]    Encounter: [id number]        ASSESSMENT and PLAN:  Diagnoses and all orders for this visit:    Chronic kidney disease, stage 3  -Creatinine has remained between 1 7 and 1 9  - she is currently not on any nephrotoxic agent  - recently had an episode of pancreatitis and she could have some mild volume depletion  - a urinalysis was negative but a urine protein to creatinine ratio was positive  - will repeat blood work in 2-3 weeks to make sure her renal function is stable    Bipolar 2 disorder ([facility] [address] )  - she was on lithium in her 23 and may have some chronic interstitial nephritis associated with the she also has some mild hypernatremia  - she is currently being treated with Seroquel    Cyst of right kidney  - she has had recent MRIs and CT scan will repeat a renal ultrasound in about 1 year to make sure that this is stable    Secondary renal hyperparathyroidism ([facility] [address] )  - she has a PTH around 150  - she has not been taking her vitamin-D supplement have asked her to restart this and take regularly  - she is currently on vitamin D3 2000 units daily will recheck in about 3-4    Proteinuria  - she has a positive urine protein to creatinine ratio but a negative urinalysis she is also anemic with CKD    She was on lithium several years ago she also has had acute kidney injury from chronic lactulose  Use  - will check a limited serologic workup that includes an serum protein electrophoresis and urine protein electrophoresis in 2 weeks  - will make further recommendations at that point    hypokalemia  - this was thought to be due to chronic 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 abuse  - she is not any lactulose now  - currently on 20 mEq of potassium chloride daily and stable at 3 9    HPI: Ananya Gordillo is a 56 y o  female who is here for No chief complaint on file  Since our last visit, there has been no ER visits or hospitilizations  Patient currently has no complaints at this time and is feeling well  Patient denies any chest pain, shortness of breath and swelling  The last blood work was done on  March, which we have reviewed together  she had a recent hospitalization is getting an ERCP by GI to evaluate choledocholithiasis in April    she currently denies any chest pain tolerating p  O  Intake well no fevers or chills no nausea vomiting diarrhea or constipation her urine output has been stable her weight is been stable her blood work was reviewed with her today    ROS:   All the systems were reviewed and were negative except as documented on the HPI  Allergies: Patient has no known allergies      Medications:   Current Outpatient Prescriptions:     atorvastatin (LIPITOR) 40 mg tablet, Take 1 tablet by mouth daily, Disp: , Rfl:     Cholecalciferol (VITAMIN D3) 2000 units capsule, Take by mouth daily  , Disp: , Rfl:     clonazePAM (KLONOPIN) 0 5 mg tablet, Take by mouth 3 (three) times a day  , Disp: , Rfl:     fluvoxaMINE (LUVOX) 100 mg tablet, Take 100 mg by mouth daily at bedtime Also takes a 50 mg tab, Disp: , Rfl:     fluvoxaMINE (LUVOX) 50 mg tablet, Take 50 mg by mouth daily at bedtime, Disp: , Rfl:     lactulose (CHRONULAC) 10 g/15 mL solution, Take 10 g by mouth 15 ml daily, Disp: , Rfl:     lamoTRIgine (LaMICtal) 100 mg tablet, Take 100 mg by mouth 3 (three) times a day Takes at hs, Disp: , Rfl:    lubiprostone (AMITIZA) 24 mcg capsule, Take 24 mcg by mouth 2 (two) times a day with meals, Disp: , Rfl:     omeprazole (PriLOSEC) 40 MG capsule, Take 40 mg by mouth daily at bedtime  , Disp: , Rfl:     polyethylene glycol (MIRALAX) 17 g packet, Take 17 g by mouth daily, Disp: , Rfl:     potassium chloride (KLOR-CON M20) 20 mEq tablet, Take 1 tablet by mouth 2 (two) times a day, Disp: , Rfl:     QUEtiapine (SEROQUEL XR) 400 mg 24 hr tablet, Take by mouth daily at bedtime  , Disp: , Rfl:     QUEtiapine (SEROQUEL) 300 mg tablet, Take 1 tablet by mouth daily, Disp: , Rfl:     Calcium Carbonate-Vit D-Min (CALCIUM 1200 PO), Take 1 tablet by mouth 2 (two) times a day, Disp: , Rfl:     multivitamin (THERAGRAN) TABS, Take 1 tablet by mouth daily, Disp: , Rfl:     Past Medical History:   Diagnosis Date    Ankle sprain     left    Anxiety disorder     Bipolar 2 disorder (HCC)     Chronic back pain     CKD (chronic kidney disease) stage 3, GFR 30-59 ml/min     Depression     Hypercholesteremia     Hyperlipidemia     Hypernatremia     Hypokalemia     Intervertebral disc disorder with radiculopathy of lumbosacral region     resolved: 2015    Panic attacks     Radiculitis     resolved: 2015    Secondary renal hyperparathyroidism ([facility] [address] )     Vitamin D deficiency      Past Surgical History:   Procedure Laterality Date    DILATION AND CURETTAGE OF UTERUS      INDUCED       surgically induced    TUBAL LIGATION Bilateral      Family History   Problem Relation Age of Onset    Bipolar disorder Mother     Heart disease Father     Hypertension Father     Other Family      Back disorder    Diabetes Family     Heart disease Family     Hypertension Family     Breast cancer Family     Stroke Family     Thyroid disease Family       reports that she has never smoked  She has never used smokeless tobacco  She reports that she does not drink alcohol or use drugs        Physical Exam: Vitals:    03/27/18 0928   BP: 122/82   BP Location: Left arm   Patient Position: Sitting   Cuff Size: Adult   Pulse: 80   Weight: 65 8 kg (145 lb)   Height: 5' 6 5" (1 689 m)     Body mass index is 23 05 kg/m²      General: conscious, cooperative, in not acute distress  Eyes: conjunctivae pink, anicteric sclerae  ENT: lips and mucous membranes moist  Neck: supple, no JVD  Chest: clear breath sounds bilateral, no crackles, ronchus or wheezings  CVS: distinct S1 & S2, normal rate, regular rhythm  Abdomen: soft, non-tender, non-distended, normoactive bowel sounds  Extremities: no edema of both legs  Skin: no rash  Neuro: awake, alert, oriented      Lab Results:          Results for orders placed or performed in visit on 03/20/18   Comprehensive metabolic panel   Result Value Ref Range    Sodium 144 136 - 145 mmol/L    Potassium 3 9 3 5 - 5 3 mmol/L    Chloride 107 100 - 108 mmol/L    CO2 27 21 - 32 mmol/L    Anion Gap 10 4 - 13 mmol/L    BUN 37 (H) 5 - 25 mg/dL    Creatinine 1 92 (H) 0 60 - 1 30 mg/dL    Glucose 97 65 - 140 mg/dL    Calcium 8 8 8 3 - 10 1 mg/dL    AST 18 5 - 45 U/L    ALT 31 12 - 78 U/L    Alkaline Phosphatase 255 (H) 46 - 116 U/L    Total Protein 6 6 6 4 - 8 2 g/dL    Albumin 3 3 (L) 3 5 - 5 0 g/dL    Total Bilirubin 0 20 0 20 - 1 00 mg/dL    eGFR 29 ml/min/1 73sq m   CBC and differential   Result Value Ref Range    WBC 5 54 4 31 - 10 16 Thousand/uL    RBC 3 47 (L) 3 81 - 5 12 Million/uL    Hemoglobin 10 6 (L) 11 5 - 15 4 g/dL    Hematocrit 35 3 34 8 - 46 1 %     (H) 82 - 98 fL    MCH 30 5 26 8 - 34 3 pg    MCHC 30 0 (L) 31 4 - 37 4 g/dL    RDW 14 3 11 6 - 15 1 %    MPV 9 7 8 9 - 12 7 fL    Platelets 051 292 - 393 Thousands/uL    Neutrophils Relative 69 43 - 75 %    Lymphocytes Relative 21 14 - 44 %    Monocytes Relative 7 4 - 12 %    Eosinophils Relative 2 0 - 6 %    Basophils Relative 1 0 - 1 %    Neutrophils Absolute 3 80 1 85 - 7 62 Thousands/µL    Lymphocytes Absolute 1 17 0 60 - 4 47 Thousands/µL    Monocytes Absolute 0 41 0 17 - 1 22 Thousand/µL    Eosinophils Absolute 0 12 0 00 - 0 61 Thousand/µL    Basophils Absolute 0 04 0 00 - 0 10 Thousands/µL   Magnesium   Result Value Ref Range    Magnesium 2 1 1 6 - 2 6 mg/dL   Phosphorus   Result Value Ref Range    Phosphorus 3 4 2 7 - 4 5 mg/dL   PTH, intact   Result Value Ref Range     3 (H) 18 4 - 80 1 pg/mL   Protein / creatinine ratio, urine   Result Value Ref Range    Creatinine, Ur <13 0 mg/dL    Protein Urine Random 11 mg/dL    Prot/Creat Ratio, Ur >0 85 (H) 0 00 - 0 10   Urinalysis with microscopic   Result Value Ref Range    Clarity, UA Clear     Color, UA Colorless     Specific Gravity, UA <=1 005 1 003 - 1 030    pH, UA 6 0 4 5 - 8 0    Glucose, UA Negative Negative mg/dl    Ketones, UA Negative Negative mg/dl    Blood, UA Negative Negative    Protein, UA Negative Negative mg/dl    Nitrite, UA Negative Negative    Bilirubin, UA Negative Negative    Urobilinogen, UA 0 2 0 2, 1 0 E U /dl E U /dl    Leukocytes, UA Negative Negative    WBC, UA None Seen None Seen, 0-5, 5-55, 5-65 /hpf    RBC, UA None Seen None Seen, 0-5 /hpf    Bacteria, UA None Seen None Seen, Occasional /hpf    Epithelial Cells None Seen None Seen, Occasional /hpf         Portions of the record may have been created with voice recognition software  Occasional wrong word or "sound a like" substitutions may have occurred due to the inherent limitations of voice recognition software  Read the chart carefully and recognize, using context, where substitutions have occurred  If you have any questions, please contact the dictating provider

## 2018-03-30 ENCOUNTER — HOSPITAL ENCOUNTER (OUTPATIENT)
Dept: NON INVASIVE DIAGNOSTICS | Facility: HOSPITAL | Age: 58
Discharge: HOME | End: 2018-03-30
Attending: INTERNAL MEDICINE
Payer: MEDICARE

## 2018-03-30 DIAGNOSIS — R09.89 LEFT CAROTID BRUIT: ICD-10-CM

## 2018-03-30 PROCEDURE — 93880 EXTRACRANIAL BILAT STUDY: CPT | Performed by: INTERNAL MEDICINE

## 2018-03-30 PROCEDURE — 93880 EXTRACRANIAL BILAT STUDY: CPT

## 2018-04-09 ENCOUNTER — ANESTHESIA EVENT (OUTPATIENT)
Dept: PERIOP | Facility: HOSPITAL | Age: 58
End: 2018-04-09
Payer: MEDICARE

## 2018-04-11 ENCOUNTER — HOSPITAL ENCOUNTER (OUTPATIENT)
Facility: HOSPITAL | Age: 58
Setting detail: OUTPATIENT SURGERY
Discharge: HOME | End: 2018-04-11
Attending: INTERNAL MEDICINE | Admitting: INTERNAL MEDICINE
Payer: MEDICARE

## 2018-04-11 ENCOUNTER — ANESTHESIA (OUTPATIENT)
Dept: PERIOP | Facility: HOSPITAL | Age: 58
End: 2018-04-11
Payer: MEDICARE

## 2018-04-11 ENCOUNTER — APPOINTMENT (OUTPATIENT)
Dept: RADIOLOGY | Facility: HOSPITAL | Age: 58
End: 2018-04-11
Payer: MEDICARE

## 2018-04-11 VITALS
HEIGHT: 66 IN | WEIGHT: 139 LBS | RESPIRATION RATE: 18 BRPM | TEMPERATURE: 99.5 F | DIASTOLIC BLOOD PRESSURE: 84 MMHG | SYSTOLIC BLOOD PRESSURE: 157 MMHG | HEART RATE: 72 BPM | BODY MASS INDEX: 22.34 KG/M2 | OXYGEN SATURATION: 97 %

## 2018-04-11 DIAGNOSIS — K83.8 OTHER SPECIFIED DISEASES OF BILIARY TRACT (CODE): ICD-10-CM

## 2018-04-11 DIAGNOSIS — R93.89 ABNORMAL FINDINGS ON DIAGNOSTIC IMAGING OF OTHER SPECIFIED BODY STRUCTURES: ICD-10-CM

## 2018-04-11 LAB — EXT PREGNANCY TEST URINE: NEGATIVE

## 2018-04-11 PROCEDURE — 88305 TISSUE EXAM BY PATHOLOGIST: CPT | Performed by: PATHOLOGY

## 2018-04-11 PROCEDURE — 74330 X-RAY BILE/PANC ENDOSCOPY: CPT

## 2018-04-11 PROCEDURE — C1769 GUIDE WIRE: HCPCS | Performed by: INTERNAL MEDICINE

## 2018-04-11 PROCEDURE — 88112 CYTOPATH CELL ENHANCE TECH: CPT | Performed by: PATHOLOGY

## 2018-04-11 PROCEDURE — 81025 URINE PREGNANCY TEST: CPT | Performed by: INTERNAL MEDICINE

## 2018-04-11 RX ORDER — PROPOFOL 10 MG/ML
INJECTION, EMULSION INTRAVENOUS AS NEEDED
Status: DISCONTINUED | OUTPATIENT
Start: 2018-04-11 | End: 2018-04-11 | Stop reason: SURG

## 2018-04-11 RX ORDER — SODIUM CHLORIDE 9 MG/ML
50 INJECTION, SOLUTION INTRAVENOUS CONTINUOUS
Status: DISCONTINUED | OUTPATIENT
Start: 2018-04-11 | End: 2018-04-11 | Stop reason: HOSPADM

## 2018-04-11 RX ORDER — PROPOFOL 10 MG/ML
INJECTION, EMULSION INTRAVENOUS CONTINUOUS PRN
Status: DISCONTINUED | OUTPATIENT
Start: 2018-04-11 | End: 2018-04-11 | Stop reason: SURG

## 2018-04-11 RX ORDER — SODIUM CHLORIDE 9 MG/ML
20 INJECTION, SOLUTION INTRAVENOUS CONTINUOUS
Status: DISCONTINUED | OUTPATIENT
Start: 2018-04-11 | End: 2018-04-11 | Stop reason: HOSPADM

## 2018-04-11 RX ORDER — FENTANYL CITRATE/PF 50 MCG/ML
25 SYRINGE (ML) INJECTION
Status: DISCONTINUED | OUTPATIENT
Start: 2018-04-11 | End: 2018-04-11 | Stop reason: HOSPADM

## 2018-04-11 RX ORDER — GLYCOPYRROLATE 0.2 MG/ML
INJECTION INTRAMUSCULAR; INTRAVENOUS AS NEEDED
Status: DISCONTINUED | OUTPATIENT
Start: 2018-04-11 | End: 2018-04-11 | Stop reason: SURG

## 2018-04-11 RX ORDER — ONDANSETRON 2 MG/ML
4 INJECTION INTRAMUSCULAR; INTRAVENOUS ONCE
Status: DISCONTINUED | OUTPATIENT
Start: 2018-04-11 | End: 2018-04-11 | Stop reason: HOSPADM

## 2018-04-11 RX ORDER — ONDANSETRON 2 MG/ML
INJECTION INTRAMUSCULAR; INTRAVENOUS AS NEEDED
Status: DISCONTINUED | OUTPATIENT
Start: 2018-04-11 | End: 2018-04-11 | Stop reason: SURG

## 2018-04-11 RX ORDER — FENTANYL CITRATE 50 UG/ML
INJECTION, SOLUTION INTRAMUSCULAR; INTRAVENOUS AS NEEDED
Status: DISCONTINUED | OUTPATIENT
Start: 2018-04-11 | End: 2018-04-11 | Stop reason: SURG

## 2018-04-11 RX ORDER — MIDAZOLAM HYDROCHLORIDE 1 MG/ML
INJECTION INTRAMUSCULAR; INTRAVENOUS AS NEEDED
Status: DISCONTINUED | OUTPATIENT
Start: 2018-04-11 | End: 2018-04-11 | Stop reason: SURG

## 2018-04-11 RX ADMIN — SODIUM CHLORIDE: 0.9 INJECTION, SOLUTION INTRAVENOUS at 11:10

## 2018-04-11 RX ADMIN — ONDANSETRON 4 MG: 2 INJECTION INTRAMUSCULAR; INTRAVENOUS at 11:13

## 2018-04-11 RX ADMIN — GLYCOPYRROLATE 0.1 MG: 0.2 INJECTION, SOLUTION INTRAMUSCULAR; INTRAVENOUS at 10:57

## 2018-04-11 RX ADMIN — PROPOFOL 40 MG: 10 INJECTION, EMULSION INTRAVENOUS at 11:18

## 2018-04-11 RX ADMIN — MIDAZOLAM HYDROCHLORIDE 1 MG: 1 INJECTION, SOLUTION INTRAMUSCULAR; INTRAVENOUS at 10:55

## 2018-04-11 RX ADMIN — FENTANYL CITRATE 25 MCG: 50 INJECTION, SOLUTION INTRAMUSCULAR; INTRAVENOUS at 11:32

## 2018-04-11 RX ADMIN — FENTANYL CITRATE 25 MCG: 50 INJECTION, SOLUTION INTRAMUSCULAR; INTRAVENOUS at 11:28

## 2018-04-11 RX ADMIN — FENTANYL CITRATE 50 MCG: 50 INJECTION, SOLUTION INTRAMUSCULAR; INTRAVENOUS at 10:57

## 2018-04-11 RX ADMIN — PROPOFOL 40 MG: 10 INJECTION, EMULSION INTRAVENOUS at 11:25

## 2018-04-11 RX ADMIN — PROPOFOL 40 MG: 10 INJECTION, EMULSION INTRAVENOUS at 11:36

## 2018-04-11 RX ADMIN — SODIUM CHLORIDE 20 ML/HR: 0.9 INJECTION, SOLUTION INTRAVENOUS at 09:19

## 2018-04-11 RX ADMIN — PROPOFOL 20 MG: 10 INJECTION, EMULSION INTRAVENOUS at 11:00

## 2018-04-11 RX ADMIN — PROPOFOL 120 MCG/KG/MIN: 10 INJECTION, EMULSION INTRAVENOUS at 10:59

## 2018-04-11 RX ADMIN — PROPOFOL 30 MG: 10 INJECTION, EMULSION INTRAVENOUS at 11:07

## 2018-04-11 RX ADMIN — MIDAZOLAM HYDROCHLORIDE 1 MG: 1 INJECTION, SOLUTION INTRAMUSCULAR; INTRAVENOUS at 10:57

## 2018-04-11 NOTE — ANESTHESIA POSTPROCEDURE EVALUATION
Post-Op Assessment Note      CV Status:  Stable    Mental Status:  Alert and awake    Hydration Status:  Stable and euvolemic    PONV Controlled:  Controlled    Airway Patency:  Patent and adequate    Post Op Vitals Reviewed: Yes          Staff: CRNA, Anesthesiologist           BP      Temp      Pulse     Resp      SpO2

## 2018-04-11 NOTE — OP NOTE
**** GI/ENDOSCOPY REPORT ****     PATIENT NAME: Nayeli Wahl - VISIT ID:  Patient ID: [id number]   YOB: 1960     INTRODUCTION: Endoscopic Retrograde Cholangiopancreatography - A 57female   patient presents for an outpatient Endoscopic Retrograde   Cholangiopancreatography at [facility]  INDICATIONS: MRCP showing ? Ampullary mass and dil CBD  CONSENT:  The benefits, risks, and alternatives to the procedure were   discussed and informed consent was obtained from the patient  PREPARATION:  EKG, pulse, pulse oximetry and blood pressure were monitored   throughout the procedure  MEDICATIONS: Anesthesia-check records     PROCEDURE:  The duodenoscope was passed with ease through the mouth under   direct visualization and advanced to the 2nd portion of the duodenum  The   scope was withdrawn and the mucosa was carefully examined  The views were   good  FINDINGS:  Wire passed into cbd- dye- no stone- prominenant ampulla-   brushed, then bx ed  COMPLICATIONS: There were no complications  IMPRESSIONS: Wire passed into cbd- dye- no stone- prominenant ampulla-   brushed, then bx ed  RECOMMENDATIONS: Await biopsies/brushing - suspect normal- office visit   follow uo to determine cause of pancreatitis  ESTIMATED BLOOD LOSS: None  PROCEDURE CODES: 78718 - ERCP with biopsy     ICD-9 Codes:     ICD-10 Codes:     PERFORMED BY: Dr Kya Collins MD on 04/11/2018  Version 1, electronically signed by Dr Felicia Baeza MD on 04/11/2018   at 12:07

## 2018-04-11 NOTE — ANESTHESIA PREPROCEDURE EVALUATION
Review of Systems/Medical History  Patient summary reviewed  Chart reviewed      Cardiovascular  EKG reviewed, Negative cardio ROS Exercise tolerance: good,  Hyperlipidemia,    Pulmonary  Negative pulmonary ROS        GI/Hepatic  Negative GI/hepatic ROS   GERD , Pancreatic problem,        Negative  ROS Chronic kidney disease stage 3,        Endo/Other  Negative endo/other ROS      GYN  Negative gynecology ROS          Hematology  Negative hematology ROS      Musculoskeletal  Negative musculoskeletal ROS   Arthritis     Neurology  Negative neurology ROS      Psychology   Negative psychology ROS Anxiety, Depression , bipolar disorder, Schizophrenia             Physical Exam    Airway    Mallampati score: II  TM Distance: >3 FB  Neck ROM: full     Dental   No notable dental hx upper dentures and lower dentures,     Cardiovascular  Comment: Negative ROS, Rhythm: regular, Rate: normal, Cardiovascular exam normal    Pulmonary  Pulmonary exam normal Breath sounds clear to auscultation,     Other Findings        Anesthesia Plan  ASA Score- 3     Anesthesia Type- IV sedation with anesthesia with ASA Monitors  Additional Monitors:   Airway Plan:         Plan Factors-    Induction- intravenous  Postoperative Plan-     Informed Consent- Anesthetic plan and risks discussed with patient  I personally reviewed this patient with the CRNA  Discussed and agreed on the Anesthesia Plan with the CRNA  Wilfrido Maurer

## 2018-04-20 DIAGNOSIS — R21 RASH AND NONSPECIFIC SKIN ERUPTION: Primary | ICD-10-CM

## 2018-04-20 RX ORDER — CLOBETASOL PROPIONATE 0.5 MG/G
CREAM TOPICAL
Qty: 15 G | Refills: 2 | Status: SHIPPED | OUTPATIENT
Start: 2018-04-20 | End: 2018-07-05

## 2018-05-10 ENCOUNTER — APPOINTMENT (OUTPATIENT)
Dept: LAB | Facility: HOSPITAL | Age: 58
End: 2018-05-10
Attending: INTERNAL MEDICINE
Payer: MEDICARE

## 2018-05-10 DIAGNOSIS — N18.30 CHRONIC KIDNEY DISEASE, STAGE 3 (HCC): Primary | ICD-10-CM

## 2018-05-10 LAB
ALBUMIN SERPL BCP-MCNC: 3.2 G/DL (ref 3.5–5)
ALP SERPL-CCNC: 202 U/L (ref 46–116)
ALT SERPL W P-5'-P-CCNC: 28 U/L (ref 12–78)
ANION GAP SERPL CALCULATED.3IONS-SCNC: 11 MMOL/L (ref 4–13)
AST SERPL W P-5'-P-CCNC: 16 U/L (ref 5–45)
BILIRUB SERPL-MCNC: 0.2 MG/DL (ref 0.2–1)
BUN SERPL-MCNC: 20 MG/DL (ref 5–25)
CALCIUM SERPL-MCNC: 8.6 MG/DL (ref 8.3–10.1)
CHLORIDE SERPL-SCNC: 115 MMOL/L (ref 100–108)
CO2 SERPL-SCNC: 21 MMOL/L (ref 21–32)
CREAT SERPL-MCNC: 2.29 MG/DL (ref 0.6–1.3)
CREAT UR-MCNC: 38.5 MG/DL
GFR SERPL CREATININE-BSD FRML MDRD: 23 ML/MIN/1.73SQ M
GLUCOSE P FAST SERPL-MCNC: 103 MG/DL (ref 65–99)
MAGNESIUM SERPL-MCNC: 2 MG/DL (ref 1.6–2.6)
PHOSPHATE SERPL-MCNC: 4.9 MG/DL (ref 2.7–4.5)
POTASSIUM SERPL-SCNC: 3 MMOL/L (ref 3.5–5.3)
PROT SERPL-MCNC: 6.5 G/DL (ref 6.4–8.2)
PROT UR-MCNC: 31 MG/DL
PROT/CREAT UR: 0.81 MG/G{CREAT} (ref 0–0.1)
SODIUM SERPL-SCNC: 147 MMOL/L (ref 136–145)

## 2018-05-10 PROCEDURE — 84166 PROTEIN E-PHORESIS/URINE/CSF: CPT | Performed by: PATHOLOGY

## 2018-05-10 PROCEDURE — 86038 ANTINUCLEAR ANTIBODIES: CPT | Performed by: INTERNAL MEDICINE

## 2018-05-10 PROCEDURE — 80053 COMPREHEN METABOLIC PANEL: CPT | Performed by: INTERNAL MEDICINE

## 2018-05-10 PROCEDURE — 82570 ASSAY OF URINE CREATININE: CPT | Performed by: INTERNAL MEDICINE

## 2018-05-10 PROCEDURE — 84100 ASSAY OF PHOSPHORUS: CPT | Performed by: INTERNAL MEDICINE

## 2018-05-10 PROCEDURE — 86255 FLUORESCENT ANTIBODY SCREEN: CPT | Performed by: INTERNAL MEDICINE

## 2018-05-10 PROCEDURE — 83735 ASSAY OF MAGNESIUM: CPT | Performed by: INTERNAL MEDICINE

## 2018-05-10 PROCEDURE — 84165 PROTEIN E-PHORESIS SERUM: CPT | Performed by: INTERNAL MEDICINE

## 2018-05-10 PROCEDURE — 84156 ASSAY OF PROTEIN URINE: CPT | Performed by: INTERNAL MEDICINE

## 2018-05-10 PROCEDURE — 36415 COLL VENOUS BLD VENIPUNCTURE: CPT | Performed by: INTERNAL MEDICINE

## 2018-05-10 PROCEDURE — 84165 PROTEIN E-PHORESIS SERUM: CPT | Performed by: PATHOLOGY

## 2018-05-10 PROCEDURE — 84166 PROTEIN E-PHORESIS/URINE/CSF: CPT | Performed by: INTERNAL MEDICINE

## 2018-05-10 PROCEDURE — 83520 IMMUNOASSAY QUANT NOS NONAB: CPT | Performed by: INTERNAL MEDICINE

## 2018-05-10 PROCEDURE — 86225 DNA ANTIBODY NATIVE: CPT | Performed by: INTERNAL MEDICINE

## 2018-05-10 NOTE — PROGRESS NOTES
I tried calling her but she did not   Nd no answering maching  Can you let her know that her sodium is up, creatinine is worse and potassium is down  She has a history of laxative abuse and if she is taking laxatives please tell her to hold  And hydrate  Also if she is feeling unwell have her go to ED  Repeat a BMP in 1 week  Thanks

## 2018-05-11 ENCOUNTER — TELEPHONE (OUTPATIENT)
Dept: NEPHROLOGY | Facility: CLINIC | Age: 58
End: 2018-05-11

## 2018-05-11 DIAGNOSIS — N18.30 CKD (CHRONIC KIDNEY DISEASE) STAGE 3, GFR 30-59 ML/MIN (HCC): Primary | ICD-10-CM

## 2018-05-11 LAB
ALBUMIN SERPL ELPH-MCNC: 3.68 G/DL (ref 3.5–5)
ALBUMIN SERPL ELPH-MCNC: 59.4 % (ref 52–65)
ALPHA1 GLOB SERPL ELPH-MCNC: 0.5 G/DL (ref 0.1–0.4)
ALPHA1 GLOB SERPL ELPH-MCNC: 8.1 % (ref 2.5–5)
ALPHA2 GLOB SERPL ELPH-MCNC: 0.94 G/DL (ref 0.4–1.2)
ALPHA2 GLOB SERPL ELPH-MCNC: 15.1 % (ref 7–13)
BETA GLOB ABNORMAL SERPL ELPH-MCNC: 0.34 G/DL (ref 0.4–0.8)
BETA1 GLOB SERPL ELPH-MCNC: 5.5 % (ref 5–13)
BETA2 GLOB SERPL ELPH-MCNC: 4.3 % (ref 2–8)
BETA2+GAMMA GLOB SERPL ELPH-MCNC: 0.27 G/DL (ref 0.2–0.5)
DSDNA AB SER-ACNC: 1 IU/ML (ref 0–9)
GAMMA GLOB ABNORMAL SERPL ELPH-MCNC: 0.47 G/DL (ref 0.5–1.6)
GAMMA GLOB SERPL ELPH-MCNC: 7.6 % (ref 12–22)
IGG/ALB SER: 1.46 {RATIO} (ref 1.1–1.8)
PROT SERPL-MCNC: 6.2 G/DL (ref 6.4–8.2)
RYE IGE QN: NEGATIVE

## 2018-05-11 NOTE — PROGRESS NOTES
Spoke with her  She will stop lactulose as she is having>3 BMs daily and repeat blood work next week  Thanks

## 2018-05-11 NOTE — TELEPHONE ENCOUNTER
----- Message from Danielle Adams DO sent at 5/10/2018  2:55 PM EDT -----  She called back and spoke with her, Can you send a script for a repeat BMP next week  I told her to hold her lactulose

## 2018-05-11 NOTE — TELEPHONE ENCOUNTER
Left message on patients voicemail for her to return call to office to discuss labs and laxative use per provider  ----- Message from Lucio Rowell DO sent at 5/10/2018  2:45 PM EDT -----  I tried calling her but she did not   Nd no answering maching  Can you let her know that her sodium is up, creatinine is worse and potassium is down  She has a history of laxative abuse and if she is taking laxatives please tell her to hold  A_  nd hydrate  Also if she is feeling unwell have her go to ED  Repeat a BMP in 1 week  Thanks

## 2018-05-14 LAB
ALBUMIN UR ELPH-MCNC: 10.7 %
ALPHA1 GLOB MFR UR ELPH: 32.1 %
ALPHA2 GLOB MFR UR ELPH: 24.3 %
B-GLOBULIN MFR UR ELPH: 11.9 %
C-ANCA TITR SER IF: NORMAL TITER
GAMMA GLOB MFR UR ELPH: 21 %
GBM AB SER IA-ACNC: 2 UNITS (ref 0–20)
MYELOPEROXIDASE AB SER IA-ACNC: <9 U/ML (ref 0–9)
P-ANCA ATYPICAL TITR SER IF: NORMAL TITER
P-ANCA TITR SER IF: NORMAL TITER
PROT PATTERN UR ELPH-IMP: ABNORMAL
PROT UR-MCNC: 26 MG/DL
PROTEINASE3 AB SER IA-ACNC: <3.5 U/ML (ref 0–3.5)

## 2018-05-15 ENCOUNTER — TELEPHONE (OUTPATIENT)
Dept: NEPHROLOGY | Facility: CLINIC | Age: 58
End: 2018-05-15

## 2018-05-15 NOTE — TELEPHONE ENCOUNTER
----- Message from Awais De La Rosa DO sent at 5/15/2018  2:44 PM EDT -----  Can you let her know that the rest of the proteinuria work up is stable    thanks

## 2018-05-17 ENCOUNTER — TELEPHONE (OUTPATIENT)
Dept: NEPHROLOGY | Facility: CLINIC | Age: 58
End: 2018-05-17

## 2018-05-17 ENCOUNTER — TRANSCRIBE ORDERS (OUTPATIENT)
Dept: ADMINISTRATIVE | Facility: HOSPITAL | Age: 58
End: 2018-05-17

## 2018-05-17 ENCOUNTER — APPOINTMENT (OUTPATIENT)
Dept: LAB | Facility: HOSPITAL | Age: 58
End: 2018-05-17
Attending: INTERNAL MEDICINE
Payer: MEDICARE

## 2018-05-17 DIAGNOSIS — K59.00 CONSTIPATION, UNSPECIFIED CONSTIPATION TYPE: ICD-10-CM

## 2018-05-17 DIAGNOSIS — K59.00 CONSTIPATION, UNSPECIFIED CONSTIPATION TYPE: Primary | ICD-10-CM

## 2018-05-17 DIAGNOSIS — N18.30 CKD (CHRONIC KIDNEY DISEASE) STAGE 3, GFR 30-59 ML/MIN (HCC): Primary | ICD-10-CM

## 2018-05-17 DIAGNOSIS — N18.30 CKD (CHRONIC KIDNEY DISEASE) STAGE 3, GFR 30-59 ML/MIN (HCC): ICD-10-CM

## 2018-05-17 LAB
ALBUMIN SERPL BCP-MCNC: 3.3 G/DL (ref 3.5–5)
ALP SERPL-CCNC: 188 U/L (ref 46–116)
ALT SERPL W P-5'-P-CCNC: 41 U/L (ref 12–78)
ANION GAP SERPL CALCULATED.3IONS-SCNC: 7 MMOL/L (ref 4–13)
AST SERPL W P-5'-P-CCNC: 32 U/L (ref 5–45)
BILIRUB SERPL-MCNC: 0.3 MG/DL (ref 0.2–1)
BUN SERPL-MCNC: 30 MG/DL (ref 5–25)
CALCIUM SERPL-MCNC: 9.6 MG/DL (ref 8.3–10.1)
CHLORIDE SERPL-SCNC: 112 MMOL/L (ref 100–108)
CO2 SERPL-SCNC: 29 MMOL/L (ref 21–32)
CREAT SERPL-MCNC: 2.18 MG/DL (ref 0.6–1.3)
GFR SERPL CREATININE-BSD FRML MDRD: 25 ML/MIN/1.73SQ M
GLUCOSE P FAST SERPL-MCNC: 105 MG/DL (ref 65–99)
POTASSIUM SERPL-SCNC: 4.7 MMOL/L (ref 3.5–5.3)
PROT SERPL-MCNC: 6.3 G/DL (ref 6.4–8.2)
SODIUM SERPL-SCNC: 148 MMOL/L (ref 136–145)

## 2018-05-17 PROCEDURE — 36415 COLL VENOUS BLD VENIPUNCTURE: CPT

## 2018-05-17 PROCEDURE — 80053 COMPREHEN METABOLIC PANEL: CPT

## 2018-05-17 NOTE — TELEPHONE ENCOUNTER
I called and spoke with Yari Duty  She's aware of her blood work results  Per Dr Jame Aguila, repeat BMP in one month  She stated that she no longer takes Lactulose  I will mail her the BMP

## 2018-05-17 NOTE — TELEPHONE ENCOUNTER
----- Message from Agnieszka Lundberg DO sent at 5/17/2018  3:12 PM EDT -----  Can you let her know that her repeat BMP shows that her creatinine is stable and improved slightly to 2  18  Would limit the use of lactulose and repeat BMP in 1 month to make sure it is stable

## 2018-06-19 DIAGNOSIS — K21.9 GASTROESOPHAGEAL REFLUX DISEASE, ESOPHAGITIS PRESENCE NOT SPECIFIED: Primary | ICD-10-CM

## 2018-06-19 RX ORDER — OMEPRAZOLE 40 MG/1
40 CAPSULE, DELAYED RELEASE ORAL
Qty: 90 CAPSULE | Refills: 3 | Status: SHIPPED | OUTPATIENT
Start: 2018-06-19 | End: 2019-06-19 | Stop reason: SDUPTHER

## 2018-06-20 ENCOUNTER — HOSPITAL ENCOUNTER (OUTPATIENT)
Dept: NON INVASIVE DIAGNOSTICS | Facility: CLINIC | Age: 58
Discharge: HOME | End: 2018-06-20
Attending: INTERNAL MEDICINE
Payer: MEDICARE

## 2018-06-20 DIAGNOSIS — R01.1 CARDIAC MURMUR: ICD-10-CM

## 2018-06-20 PROCEDURE — 93306 TTE W/DOPPLER COMPLETE: CPT

## 2018-06-20 PROCEDURE — 93306 TTE W/DOPPLER COMPLETE: CPT | Performed by: INTERNAL MEDICINE

## 2018-06-22 ENCOUNTER — TELEPHONE (OUTPATIENT)
Dept: FAMILY MEDICINE CLINIC | Facility: HOSPITAL | Age: 58
End: 2018-06-22

## 2018-06-27 ENCOUNTER — TELEPHONE (OUTPATIENT)
Dept: NEPHROLOGY | Facility: CLINIC | Age: 58
End: 2018-06-27

## 2018-06-27 ENCOUNTER — APPOINTMENT (OUTPATIENT)
Dept: LAB | Facility: HOSPITAL | Age: 58
End: 2018-06-27
Attending: INTERNAL MEDICINE
Payer: MEDICARE

## 2018-06-27 DIAGNOSIS — N18.30 CKD (CHRONIC KIDNEY DISEASE) STAGE 3, GFR 30-59 ML/MIN (HCC): ICD-10-CM

## 2018-06-27 LAB
ANION GAP SERPL CALCULATED.3IONS-SCNC: 6 MMOL/L (ref 4–13)
BUN SERPL-MCNC: 30 MG/DL (ref 5–25)
CALCIUM SERPL-MCNC: 9.1 MG/DL (ref 8.3–10.1)
CHLORIDE SERPL-SCNC: 111 MMOL/L (ref 100–108)
CO2 SERPL-SCNC: 29 MMOL/L (ref 21–32)
CREAT SERPL-MCNC: 2.1 MG/DL (ref 0.6–1.3)
GFR SERPL CREATININE-BSD FRML MDRD: 26 ML/MIN/1.73SQ M
GLUCOSE P FAST SERPL-MCNC: 93 MG/DL (ref 65–99)
POTASSIUM SERPL-SCNC: 4.5 MMOL/L (ref 3.5–5.3)
SODIUM SERPL-SCNC: 146 MMOL/L (ref 136–145)

## 2018-06-27 PROCEDURE — 36415 COLL VENOUS BLD VENIPUNCTURE: CPT

## 2018-06-27 PROCEDURE — 80048 BASIC METABOLIC PNL TOTAL CA: CPT

## 2018-06-27 NOTE — TELEPHONE ENCOUNTER
Anamaria Underwood called the office requesting the results of her most recent blood work that was done this morning  Please advise and I will call her back

## 2018-06-27 NOTE — TELEPHONE ENCOUNTER
You can let her know that her creatinine is slightly improved to 2 1 I will discuss her labs more in depth with her at her follow up appointment

## 2018-06-28 ENCOUNTER — TELEPHONE (OUTPATIENT)
Dept: NEPHROLOGY | Facility: CLINIC | Age: 58
End: 2018-06-28

## 2018-07-02 ENCOUNTER — LAB REQUISITION (OUTPATIENT)
Dept: LAB | Facility: HOSPITAL | Age: 58
End: 2018-07-02
Payer: MEDICARE

## 2018-07-02 ENCOUNTER — APPOINTMENT (OUTPATIENT)
Dept: LAB | Facility: HOSPITAL | Age: 58
End: 2018-07-02
Payer: MEDICARE

## 2018-07-02 ENCOUNTER — TRANSCRIBE ORDERS (OUTPATIENT)
Dept: ADMINISTRATIVE | Facility: HOSPITAL | Age: 58
End: 2018-07-02

## 2018-07-02 ENCOUNTER — HOSPITAL ENCOUNTER (OUTPATIENT)
Dept: NON INVASIVE DIAGNOSTICS | Facility: HOSPITAL | Age: 58
Discharge: HOME | End: 2018-07-02
Payer: MEDICARE

## 2018-07-02 DIAGNOSIS — K62.3 RECTAL PROLAPSE: Primary | ICD-10-CM

## 2018-07-02 DIAGNOSIS — K62.3 RECTAL PROLAPSE: ICD-10-CM

## 2018-07-02 DIAGNOSIS — Z01.818 ENCOUNTER FOR OTHER PREPROCEDURAL EXAMINATION: ICD-10-CM

## 2018-07-02 LAB
ABO GROUP BLD: NORMAL
ALBUMIN SERPL BCP-MCNC: 3.8 G/DL (ref 3.5–5)
ALP SERPL-CCNC: 169 U/L (ref 46–116)
ALT SERPL W P-5'-P-CCNC: 52 U/L (ref 12–78)
ANION GAP SERPL CALCULATED.3IONS-SCNC: 8 MMOL/L (ref 4–13)
AST SERPL W P-5'-P-CCNC: 37 U/L (ref 5–45)
BASOPHILS # BLD AUTO: 0.04 THOUSANDS/ΜL (ref 0–0.1)
BASOPHILS NFR BLD AUTO: 1 % (ref 0–1)
BILIRUB SERPL-MCNC: 0.3 MG/DL (ref 0.2–1)
BLD GP AB SCN SERPL QL: NEGATIVE
BUN SERPL-MCNC: 20 MG/DL (ref 5–25)
CALCIUM SERPL-MCNC: 9.2 MG/DL (ref 8.3–10.1)
CHLORIDE SERPL-SCNC: 107 MMOL/L (ref 100–108)
CO2 SERPL-SCNC: 30 MMOL/L (ref 21–32)
CREAT SERPL-MCNC: 2.05 MG/DL (ref 0.6–1.3)
EOSINOPHIL # BLD AUTO: 0.02 THOUSAND/ΜL (ref 0–0.61)
EOSINOPHIL NFR BLD AUTO: 1 % (ref 0–6)
ERYTHROCYTE [DISTWIDTH] IN BLOOD BY AUTOMATED COUNT: 12.7 % (ref 11.6–15.1)
EST. AVERAGE GLUCOSE BLD GHB EST-MCNC: 111 MG/DL
GFR SERPL CREATININE-BSD FRML MDRD: 27 ML/MIN/1.73SQ M
GLUCOSE P FAST SERPL-MCNC: 98 MG/DL (ref 65–99)
HBA1C MFR BLD: 5.5 % (ref 4.2–6.3)
HCT VFR BLD AUTO: 37.3 % (ref 34.8–46.1)
HGB BLD-MCNC: 11.5 G/DL (ref 11.5–15.4)
IMM GRANULOCYTES # BLD AUTO: 0.01 THOUSAND/UL (ref 0–0.2)
IMM GRANULOCYTES NFR BLD AUTO: 0 % (ref 0–2)
LYMPHOCYTES # BLD AUTO: 1.27 THOUSANDS/ΜL (ref 0.6–4.47)
LYMPHOCYTES NFR BLD AUTO: 36 % (ref 14–44)
MCH RBC QN AUTO: 30.9 PG (ref 26.8–34.3)
MCHC RBC AUTO-ENTMCNC: 30.8 G/DL (ref 31.4–37.4)
MCV RBC AUTO: 100 FL (ref 82–98)
MONOCYTES # BLD AUTO: 0.37 THOUSAND/ΜL (ref 0.17–1.22)
MONOCYTES NFR BLD AUTO: 11 % (ref 4–12)
NEUTROPHILS # BLD AUTO: 1.8 THOUSANDS/ΜL (ref 1.85–7.62)
NEUTS SEG NFR BLD AUTO: 51 % (ref 43–75)
NRBC BLD AUTO-RTO: 0 /100 WBCS
PLATELET # BLD AUTO: 252 THOUSANDS/UL (ref 149–390)
PMV BLD AUTO: 10.4 FL (ref 8.9–12.7)
POTASSIUM SERPL-SCNC: 4.2 MMOL/L (ref 3.5–5.3)
PROT SERPL-MCNC: 7.1 G/DL (ref 6.4–8.2)
RBC # BLD AUTO: 3.72 MILLION/UL (ref 3.81–5.12)
RH BLD: POSITIVE
SODIUM SERPL-SCNC: 145 MMOL/L (ref 136–145)
SPECIMEN EXPIRATION DATE: NORMAL
WBC # BLD AUTO: 3.51 THOUSAND/UL (ref 4.31–10.16)

## 2018-07-02 PROCEDURE — 86901 BLOOD TYPING SEROLOGIC RH(D): CPT | Performed by: COLON & RECTAL SURGERY

## 2018-07-02 PROCEDURE — 80053 COMPREHEN METABOLIC PANEL: CPT

## 2018-07-02 PROCEDURE — 83036 HEMOGLOBIN GLYCOSYLATED A1C: CPT

## 2018-07-02 PROCEDURE — 85025 COMPLETE CBC W/AUTO DIFF WBC: CPT

## 2018-07-02 PROCEDURE — 86850 RBC ANTIBODY SCREEN: CPT | Performed by: COLON & RECTAL SURGERY

## 2018-07-02 PROCEDURE — 86900 BLOOD TYPING SEROLOGIC ABO: CPT | Performed by: COLON & RECTAL SURGERY

## 2018-07-02 PROCEDURE — 36415 COLL VENOUS BLD VENIPUNCTURE: CPT

## 2018-07-02 PROCEDURE — 93005 ELECTROCARDIOGRAM TRACING: CPT

## 2018-07-03 LAB
ATRIAL RATE: 71 BPM
P AXIS: 54 DEGREES
PR INTERVAL: 130 MS
QRS AXIS: 72 DEGREES
QRSD INTERVAL: 94 MS
QT INTERVAL: 418 MS
QTC INTERVAL: 454 MS
T WAVE AXIS: 57 DEGREES
VENTRICULAR RATE: 71 BPM

## 2018-07-03 PROCEDURE — 93010 ELECTROCARDIOGRAM REPORT: CPT | Performed by: INTERNAL MEDICINE

## 2018-07-05 ENCOUNTER — ANESTHESIA EVENT (OUTPATIENT)
Dept: PERIOP | Facility: HOSPITAL | Age: 58
DRG: 330 | End: 2018-07-05
Payer: MEDICARE

## 2018-07-05 RX ORDER — ACETAMINOPHEN 325 MG/1
650 TABLET ORAL EVERY 6 HOURS PRN
COMMUNITY
End: 2022-02-14

## 2018-07-05 NOTE — PRE-PROCEDURE INSTRUCTIONS
Pre-Surgery Instructions:   Medication Instructions    acetaminophen (TYLENOL) 325 mg tablet Instructed patient per Anesthesia Guidelines   atorvastatin (LIPITOR) 40 mg tablet Instructed patient per Anesthesia Guidelines   clonazePAM (KLONOPIN) 0 5 mg tablet Instructed patient per Anesthesia Guidelines   fluvoxaMINE (LUVOX) 100 mg tablet Instructed patient per Anesthesia Guidelines   fluvoxaMINE (LUVOX) 50 mg tablet Instructed patient per Anesthesia Guidelines   lamoTRIgine (LaMICtal) 100 mg tablet Instructed patient per Anesthesia Guidelines   Linaclotide (LINZESS) 290 MCG CAPS Instructed patient per Anesthesia Guidelines   omeprazole (PriLOSEC) 40 MG capsule Instructed patient per Anesthesia Guidelines   polyethylene glycol (MIRALAX) 17 g packet Instructed patient per Anesthesia Guidelines   potassium chloride (KLOR-CON M20) 20 mEq tablet Instructed patient per Anesthesia Guidelines   QUEtiapine (SEROQUEL XR) 400 mg 24 hr tablet Instructed patient per Anesthesia Guidelines   QUEtiapine (SEROQUEL) 300 mg tablet Instructed patient per Anesthesia Guidelines  REVIEWED  PRINTED SURGICAL INSTRUCTIONS WITH PATIENT , PATIENT VERBALIZED UNDERSTANDING   MEDICATIONS REVIEWED

## 2018-07-06 ENCOUNTER — OFFICE VISIT (OUTPATIENT)
Dept: FAMILY MEDICINE CLINIC | Facility: HOSPITAL | Age: 58
End: 2018-07-06
Payer: MEDICARE

## 2018-07-06 VITALS
HEART RATE: 84 BPM | SYSTOLIC BLOOD PRESSURE: 160 MMHG | WEIGHT: 160 LBS | RESPIRATION RATE: 16 BRPM | HEIGHT: 66 IN | BODY MASS INDEX: 25.71 KG/M2 | DIASTOLIC BLOOD PRESSURE: 90 MMHG

## 2018-07-06 DIAGNOSIS — I10 ESSENTIAL HYPERTENSION: Primary | ICD-10-CM

## 2018-07-06 PROCEDURE — 99214 OFFICE O/P EST MOD 30 MIN: CPT | Performed by: PHYSICIAN ASSISTANT

## 2018-07-06 RX ORDER — QUETIAPINE FUMARATE 400 MG/1
TABLET, FILM COATED ORAL
COMMUNITY
End: 2019-01-02 | Stop reason: SDUPTHER

## 2018-07-06 RX ORDER — AMLODIPINE BESYLATE 5 MG/1
5 TABLET ORAL DAILY
Qty: 30 TABLET | Refills: 3 | Status: SHIPPED | OUTPATIENT
Start: 2018-07-06 | End: 2018-11-04 | Stop reason: SDUPTHER

## 2018-07-06 RX ORDER — METRONIDAZOLE 500 MG/1
500 TABLET ORAL EVERY 12 HOURS SCHEDULED
COMMUNITY
Start: 2018-07-02 | End: 2018-07-17 | Stop reason: HOSPADM

## 2018-07-06 RX ORDER — CLOMIPRAMINE HYDROCHLORIDE 50 MG/1
50 CAPSULE ORAL
COMMUNITY
Start: 2018-07-03 | End: 2018-11-13 | Stop reason: ALTCHOICE

## 2018-07-06 RX ORDER — NEOMYCIN SULFATE 500 MG/1
500 TABLET ORAL 2 TIMES DAILY
COMMUNITY
Start: 2018-07-02 | End: 2018-07-17 | Stop reason: HOSPADM

## 2018-07-06 NOTE — PATIENT INSTRUCTIONS
Patient is cleared for surgery  Was started on Amlodipine for elevated blood pressure  Info given on HTN, and urged to monitor blood pressure

## 2018-07-06 NOTE — PROGRESS NOTES
Assessment/Plan:         Diagnoses and all orders for this visit:    Pre-op evaluation  Essential hypertension  -     amLODIPine (NORVASC) 5 mg tablet; Take 1 tablet (5 mg total) by mouth daily      Subjective:      Patient ID: Aniya Rowan is a 66 y o  female  66year old white female presents to get pre-op clearance  Having rectal prolapse procedure/repair with Dr Eric Conte, in [place], Friday, July 13th  Was given 2 antibiotics given to take Thursday prior to procedure  Had EKG, and blood test     Has hx  Of kidney dz  Due to chronic use of Lithium for bipolar disorder, was taken off [place]  Review of Systems   Constitutional: Negative for chills, diaphoresis, fatigue and fever  HENT: Negative for congestion, ear pain, rhinorrhea and sore throat  Respiratory: Negative for cough, chest tightness and shortness of breath  Cardiovascular: Negative for chest pain, palpitations and leg swelling  Gastrointestinal: Positive for constipation  Negative for abdominal pain, anal bleeding, blood in stool, diarrhea, nausea and vomiting  Musculoskeletal: Negative for arthralgias, back pain, myalgias, neck pain and neck stiffness  Neurological: Negative for dizziness, light-headedness and headaches  Objective:      /88   Pulse 84   Resp 16   Ht 5' 6" (1 676 m)   Wt 72 6 kg (160 lb)   LMP  (LMP Unknown)   BMI 25 82 kg/m²          Physical Exam   Constitutional: She is oriented to person, place, and time  She appears well-developed and well-nourished  No distress  HENT:   Head: Normocephalic and atraumatic  Eyes: Conjunctivae and EOM are normal  Right eye exhibits no discharge  Left eye exhibits no discharge  No scleral icterus  Neck: Neck supple  Cardiovascular: Normal rate, regular rhythm and normal heart sounds  Pulmonary/Chest: Effort normal and breath sounds normal  No respiratory distress  She has no wheezes  She has no rales  Abdominal: Soft   Bowel sounds are normal  She exhibits no distension and no mass  There is no tenderness  There is no rebound and no guarding  Musculoskeletal: Normal range of motion  She exhibits no edema, tenderness or deformity  Neurological: She is alert and oriented to person, place, and time  Skin: She is not diaphoretic  Psychiatric: She has a normal mood and affect  Her behavior is normal  Judgment and thought content normal    Nursing note and vitals reviewed

## 2018-07-10 ENCOUNTER — OFFICE VISIT (OUTPATIENT)
Dept: NEPHROLOGY | Facility: HOSPITAL | Age: 58
End: 2018-07-10
Payer: MEDICARE

## 2018-07-10 VITALS
HEIGHT: 66 IN | WEIGHT: 158.4 LBS | BODY MASS INDEX: 25.46 KG/M2 | SYSTOLIC BLOOD PRESSURE: 158 MMHG | DIASTOLIC BLOOD PRESSURE: 80 MMHG | HEART RATE: 70 BPM

## 2018-07-10 DIAGNOSIS — E87.6 HYPOKALEMIA: ICD-10-CM

## 2018-07-10 DIAGNOSIS — N25.81 SECONDARY RENAL HYPERPARATHYROIDISM (HCC): ICD-10-CM

## 2018-07-10 DIAGNOSIS — N28.1 CYST OF RIGHT KIDNEY: Chronic | ICD-10-CM

## 2018-07-10 DIAGNOSIS — N18.30 CHRONIC KIDNEY DISEASE, STAGE 3 (HCC): Primary | ICD-10-CM

## 2018-07-10 PROCEDURE — 99214 OFFICE O/P EST MOD 30 MIN: CPT | Performed by: INTERNAL MEDICINE

## 2018-07-10 RX ORDER — ADHESIVE BANDAGE 3/4"
BANDAGE TOPICAL DAILY
Qty: 1 EACH | Refills: 0 | Status: SHIPPED | OUTPATIENT
Start: 2018-07-10 | End: 2018-07-10 | Stop reason: SDUPTHER

## 2018-07-10 RX ORDER — ADHESIVE BANDAGE 3/4"
BANDAGE TOPICAL DAILY
Qty: 1 EACH | Refills: 0 | Status: SHIPPED | OUTPATIENT
Start: 2018-07-10 | End: 2018-11-13 | Stop reason: SDUPTHER

## 2018-07-10 NOTE — PATIENT INSTRUCTIONS
Chronic Kidney Disease   WHAT YOU NEED TO KNOW:   Chronic kidney disease (CKD) is the gradual and permanent loss of kidney function  It is also called chronic kidney failure, or chronic renal insufficiency  Normally, the kidneys remove fluid, chemicals, and waste from your blood  These wastes are turned into urine by your kidneys  CKD may worsen over time and lead to kidney failure  DISCHARGE INSTRUCTIONS:   Return to the emergency department if:   · You are confused and very drowsy  · You have a seizure  · You have shortness of breath  Contact your healthcare provider if:   · You suddenly gain or lose more weight than your healthcare provider has told you is okay  · You have itchy skin or a rash  · You urinate more or less than you normally do  · You have blood in your urine  · You have nausea and repeated vomiting  · You have fatigue or muscle weakness  · You have hiccups that will not stop  · You have questions or concerns about your condition or care  Medicines:   · Medicines  may be given to decrease blood pressure and get rid of extra fluid  You may also receive medicine to manage health conditions that may occur with CKD, such as anemia, diabetes, and heart disease  · Take your medicine as directed  Contact your healthcare provider if you think your medicine is not helping or if you have side effects  Tell him or her if you are allergic to any medicine  Keep a list of the medicines, vitamins, and herbs you take  Include the amounts, and when and why you take them  Bring the list or the pill bottles to follow-up visits  Carry your medicine list with you in case of an emergency  Follow up with your healthcare provider as directed: You will need to return for tests to monitor your kidney function  You may also be referred to a kidney specialist  Write down your questions so you remember to ask them during your visits  Manage other health conditions:   Follow your healthcare provider's directions on how to manage diabetes, high blood pressure, and heart disease  These conditions can make CKD worse  Talk to your healthcare provider before you take over-the-counter medicine  Medicines such as NSAIDs, stomach medicine, or laxatives may harm your kidneys  Weigh yourself daily:  Ask your healthcare provider what your weight should be  Ask how much liquid you should drink each day  CKD may cause you to gain or lose weight rapidly  Weigh yourself every day  Write down your weight, how much liquid you drink or eat, and how much you urinate each day  Contact your healthcare provider if your weight is higher or lower than it should be  Manage CKD:   · Maintain a healthy weight  Ask your healthcare provider how much you should weigh  Ask him to help you create a weight loss plan if you are overweight  · Exercise 30 to 60 minutes a day, 4 to 7 times a week, or as directed  Ask about the best exercise plan for you  Regular exercise can help you manage CKD, high blood pressure, and diabetes  · Follow your healthcare provider's advice about what to eat and drink  He may tell you to eat food low in sodium (salt), potassium, phosphorus, or protein  You may need to see a dietitian if you need help planning meals  Ask how much liquid to drink each day and which liquids are best for you  · Limit alcohol  Ask how much alcohol is safe for you to drink  A drink of alcohol is 12 ounces of beer, 5 ounces of wine, or 1½ ounces of liquor  · Do not smoke  Nicotine and other chemicals in cigarettes and cigars can cause lung and kidney damage  Ask your healthcare provider for information if you currently smoke and need help to quit  E-cigarettes or smokeless tobacco still contain nicotine  Talk to your healthcare provider before you use these products  · Ask your healthcare provider if you need vaccines    Infections such as pneumonia, influenza, and hepatitis can be more harmful or more likely to occur in a person who has CKD  Vaccines reduce your risk of infection with these viruses  © 2017 [facility] Information is for End User's use only and may not be sold, redistributed or otherwise used for commercial purposes  All illustrations and images included in CareNotes® are the copyrighted property of A D A M , Inc  or [employer]  The above information is an  only  It is not intended as medical advice for individual conditions or treatments  Talk to your doctor, nurse or pharmacist before following any medical regimen to see if it is safe and effective for you

## 2018-07-10 NOTE — PROGRESS NOTES
OFFICE FOLLOW UP - Nephrology   Noe Obando 66 y o  female MRN: [id number]    Encounter: [id number]        ASSESSMENT and PLAN:  Diagnoses and all orders for this visit:    53-year-old female with a past medical history of bipolar disorder type 2, stage 3 chronic kidney disease who presents for follow-up     Acute kidney injury onChronic kidney disease, stage 3  - her creatinine has improved now to 2 and her baseline  Creatinine has remained between 1 7 and 1 9  - she is currently not on any nephrotoxic agent,  Was on lithium for several years  -  Her acute kidney injury and worsening creatinine is likely related to a pre renal azotemia-transitioning to ATN- this is caused by her chronic lactulose abuse, she unfortunately  Was trying to control her weights by increasing lactulose use and was having greater than 5-6 bowel movements daily  She has subsequently now told me that she stop all laxatives accept MiraLax she also has rectal prolapse and is now going for surgery this week her creatinine has slowly improved now from 2 2 down to 2  -  Given that this is relatively stable now we will follow up with repeat blood work in 4 months  - rectal surgery can call us in consultation if needed postoperatively while hospitalized    Bipolar 2 disorder ([facility] )  - she was on lithium in her 25s and may have some chronic interstitial nephritis associated with the she also has some mild hypernatremia  - she is currently being treated with Seroquel    Cyst of right kidney  - she has had recent MRIs and CT scan will repeat a renal ultrasound in  2019 vto make sure that this is stable    Secondary renal hyperparathyroidism ([facility] )  - she has a PTH around 150  -  Did want her to start vitamin D3 2000 units daily she has not been taking this but said she will start now    Proteinuria  - she has a positive urine protein to creatinine ratio but a negative urinalysis she is  mildly anemic with CKD    She was on lithium several years ago she also has had acute kidney injury from chronic lactulose  Use  -  Limited serologic workup was negative including SPEP and UPEP    hypokalemia  - this was thought to be due to chronic  Lactulose abuse  - currently on 20 mEq of potassium chloride daily  With a stable potassium    hypertension  - blood pressures are elevated today in the office  - she was started on amlodipine 5 mg daily by her primary care group  -  have written for a blood pressure cuff and would like for her to check her blood pressures at home daily for the next 2 weeks write them down and  Notify us of the result  - will potentially consider a 24 hour ambulatory blood pressure monitor as well    Overall I do believe that her renal issues are related to a pre renal azotemia causing acute kidney injury and ATN with worsening residual CKD  This is in relation to her lactulose abuse and she does  Recognize that  Using laxatives to control her weight is a problem  She has discontinued all lactulose now and has been working with her mental health providers in regards to her body image issues along with her bipolar disorder  I did provide support for Her today and answered all questions that she needed    HPI: Nancy Fishman is a 66 y o  female who is here for No chief complaint on file  Since our last visit, there has been no ER visits or hospitilizations  Patient currently has no complaints at this time and is feeling well  Patient denies any chest pain, shortness of breath and swelling  The last blood work was done on  March, which we have reviewed together  she had a recent hospitalization is getting an ERCP by GI to evaluate choledocholithiasis in April    she currently denies any chest pain tolerating p  O   Intake well no fevers or chills no nausea vomiting diarrhea or constipation her urine output has been stable her weight is been stable her blood work was reviewed with her today    ROS:   All the systems were reviewed and were negative except as documented on the HPI  Allergies: Patient has no known allergies      Medications:   Current Outpatient Prescriptions:     acetaminophen (TYLENOL) 325 mg tablet, Take 650 mg by mouth every 6 (six) hours as needed for mild pain, Disp: , Rfl:     amLODIPine (NORVASC) 5 mg tablet, Take 1 tablet (5 mg total) by mouth daily, Disp: 30 tablet, Rfl: 3    atorvastatin (LIPITOR) 40 mg tablet, Take 1 tablet by mouth daily, Disp: , Rfl:     clonazePAM (KLONOPIN) 0 5 mg tablet, Take by mouth 3 (three) times a day  , Disp: , Rfl:     fluvoxaMINE (LUVOX) 100 mg tablet, Take 100 mg by mouth daily at bedtime Also takes a 50 mg tab, Disp: , Rfl:     fluvoxaMINE (LUVOX) 50 mg tablet, Take 50 mg by mouth daily at bedtime, Disp: , Rfl:     lamoTRIgine (LaMICtal) 100 mg tablet, Take 100 mg by mouth 3 (three) times a day Takes at hs, Disp: , Rfl:     Linaclotide (LINZESS) 290 MCG CAPS, Take by mouth, Disp: , Rfl:     metroNIDAZOLE (FLAGYL) 500 mg tablet, Take 500 mg by mouth every 12 (twelve) hours  , Disp: , Rfl:     neomycin (MYCIFRADIN) 500 mg tablet, Take 500 mg by mouth 2 (two) times a day  , Disp: , Rfl:     omeprazole (PriLOSEC) 40 MG capsule, Take 1 capsule (40 mg total) by mouth daily at bedtime, Disp: 90 capsule, Rfl: 3    polyethylene glycol (MIRALAX) 17 g packet, Take 17 g by mouth daily, Disp: , Rfl:     potassium chloride (KLOR-CON M20) 20 mEq tablet, Take 1 tablet by mouth 2 (two) times a day, Disp: , Rfl:     QUEtiapine (SEROQUEL) 300 mg tablet, 1 in the AM     ( also takes 400 mg at bedtime) , Disp: , Rfl:     QUEtiapine (SEROquel) 400 MG tablet, 1 at bedtime, Disp: , Rfl:     Blood Pressure Monitoring (BLOOD PRESSURE CUFF) MISC, by Does not apply route daily, Disp: 1 each, Rfl: 0    clomiPRAMINE (ANAFRANIL) 50 mg capsule, Take 50 mg by mouth daily at bedtime  , Disp: , Rfl:     Past Medical History:   Diagnosis Date    Ankle sprain     left    Anxiety disorder     [facility]     Chronic back pain     CKD (chronic kidney disease) stage 3, GFR 30-59 ml/min     Depression     Hypercholesteremia     Hyperlipidemia     Hypernatremia     Hypokalemia     Intervertebral disc disorder with radiculopathy of lumbosacral region     resolved: 2015    Panic attacks     Radiculitis     resolved: 2015    Secondary renal hyperparathyroidism ([facility] [address] )     Vitamin D deficiency      Past Surgical History:   Procedure Laterality Date    DILATION AND CURETTAGE OF UTERUS      INDUCED       surgically induced    OK ERCP DX COLLECTION SPECIMEN BRUSHING/WASHING N/A 2018    Procedure: ENDOSCOPIC RETROGRADE CHOLANGIOPANCREATOGRAPHY (ERCP); Surgeon: Gael Baca MD;  Location:  MAIN OR;  Service: Gastroenterology    TUBAL LIGATION Bilateral 1997     Family History   Problem Relation Age of Onset    Bipolar disorder Mother     Mental illness Mother         depression    Heart disease Father     Hypertension Father     Other Family         Back disorder    Diabetes Family     Heart disease Family     Hypertension Family     Breast cancer Family     Stroke Family     Thyroid disease Family     Substance Abuse Neg Hx         neg fam hx      reports that she has never smoked  She has never used smokeless tobacco  She reports that she does not drink alcohol or use drugs  Physical Exam:   Vitals:    07/10/18 1129   BP: 158/80   BP Location: Left arm   Patient Position: Sitting   Cuff Size: Standard   Pulse: 70   Weight: 71 8 kg (158 lb 6 4 oz)   Height: 5' 6" (1 676 m)     Body mass index is 25 57 kg/m²      General: conscious, cooperative, in not acute distress  Eyes: conjunctivae pink, anicteric sclerae  ENT: lips and mucous membranes moist  Neck: supple, no JVD  Chest: clear breath sounds bilateral, no crackles, ronchus or wheezings  CVS: distinct S1 & S2, normal rate, regular rhythm  Abdomen: soft, non-tender, non-distended, normoactive bowel sounds  Extremities: no edema of both legs  Skin: no rash  Neuro: awake, alert, oriented      Lab Results:       most recent creatinine was 2 0   potassium 4 2    Portions of the record may have been created with voice recognition software  Occasional wrong word or "sound a like" substitutions may have occurred due to the inherent limitations of voice recognition software  Read the chart carefully and recognize, using context, where substitutions have occurred  If you have any questions, please contact the dictating provider

## 2018-07-10 NOTE — LETTER
July 10, 2018     Juarez Mccormack, [address]  [address] [facility] [address]    Patient: Mesha Agent   YOB: 1960   Date of Visit: 7/10/2018       Dear Dr Garima Nguyen: Thank you for referring Indio Thurston to me for evaluation  Below are my notes for this consultation  If you have questions, please do not hesitate to call me  I look forward to following your patient along with you  Sincerely,        Sylvain Jauregui DO        CC: No Recipients  Sylvain Jauregui DO  7/10/2018 12:08 PM  Sign at close encounter  OFFICE FOLLOW UP - Nephrology   Mesha Agent 66 y o  female MRN: [id number]    Encounter: [id number]        ASSESSMENT and PLAN:  Diagnoses and all orders for this visit:    61-year-old female with a past medical history of bipolar disorder type 2, stage 3 chronic kidney disease who presents for follow-up     Acute kidney injury onChronic kidney disease, stage 3  - her creatinine has improved now to 2 and her baseline  Creatinine has remained between 1 7 and 1 9  - she is currently not on any nephrotoxic agent,  Was on lithium for several years  -  Her acute kidney injury and worsening creatinine is likely related to a pre renal azotemia-transitioning to ATN- this is caused by her chronic lactulose abuse, she unfortunately  Was trying to control her weights by increasing lactulose use and was having greater than 5-6 bowel movements daily     She has subsequently now told me that she stop all laxatives accept MiraLax she also has rectal prolapse and is now going for surgery this week her creatinine has slowly improved now from 2 2 down to 2  -  Given that this is relatively stable now we will follow up with repeat blood work in 4 months  - rectal surgery can help us in consultation if needed postoperative    Bipolar 2 disorder ([facility] [address] )  - she was on lithium in her 25s and may have some chronic interstitial nephritis associated with the she also has some mild hypernatremia  - she is currently being treated with Seroquel    Cyst of right kidney  - she has had recent MRIs and CT scan will repeat a renal ultrasound in  2019 vto make sure that this is stable    Secondary renal hyperparathyroidism ([address] )  - she has a PTH around 150  -  Did want her to start vitamin D3 2000 units daily she has not been taking this but said she will start now    Proteinuria  - she has a positive urine protein to creatinine ratio but a negative urinalysis she is  mildly anemic with CKD  She was on lithium several years ago she also has had acute kidney injury from chronic lactulose  Use  -  Limited serologic workup was negative including SPEP and UPEP    hypokalemia  - this was thought to be due to chronic  Lactulose abuse  - currently on 20 mEq of potassium chloride daily  With a stable potassium    hypertension  - blood pressures are elevated today in the office  - she was started on amlodipine 5 mg daily by her primary care group  -  have written for a blood pressure cuff and would like for her to check her blood pressures at home daily for the next 2 weeks write them down and  Notify us of the result  - will potentially consider a 24 hour ambulatory blood pressure monitor as well    Overall I do believe that her renal issues are related to a pre renal azotemia causing acute kidney injury and ATN with worsening residual CKD  This is in relation to her lactulose abuse and she does  Recognize that  Using laxatives to control her weight is a problem  She has discontinued all lactulose now and has been working with her mental health providers in regards to her body image issues along with her bipolar disorder  I did provide support for Her today and answered all questions that she needed    HPI: Gene Roth is a 66 y o  female who is here for No chief complaint on file  Since our last visit, there has been no ER visits or hospitilizations   Patient currently has no complaints at this time and is feeling well  Patient denies any chest pain, shortness of breath and swelling  The last blood work was done on  March, which we have reviewed together  she had a recent hospitalization is getting an ERCP by GI to evaluate choledocholithiasis in April    she currently denies any chest pain tolerating p  O  Intake well no fevers or chills no nausea vomiting diarrhea or constipation her urine output has been stable her weight is been stable her blood work was reviewed with her today    ROS:   All the systems were reviewed and were negative except as documented on the HPI  Allergies: Patient has no known allergies      Medications:   Current Outpatient Prescriptions:     acetaminophen (TYLENOL) 325 mg tablet, Take 650 mg by mouth every 6 (six) hours as needed for mild pain, Disp: , Rfl:     amLODIPine (NORVASC) 5 mg tablet, Take 1 tablet (5 mg total) by mouth daily, Disp: 30 tablet, Rfl: 3    atorvastatin (LIPITOR) 40 mg tablet, Take 1 tablet by mouth daily, Disp: , Rfl:     clonazePAM (KLONOPIN) 0 5 mg tablet, Take by mouth 3 (three) times a day  , Disp: , Rfl:     fluvoxaMINE (LUVOX) 100 mg tablet, Take 100 mg by mouth daily at bedtime Also takes a 50 mg tab, Disp: , Rfl:     fluvoxaMINE (LUVOX) 50 mg tablet, Take 50 mg by mouth daily at bedtime, Disp: , Rfl:     lamoTRIgine (LaMICtal) 100 mg tablet, Take 100 mg by mouth 3 (three) times a day Takes at hs, Disp: , Rfl:     Linaclotide (LINZESS) 290 MCG CAPS, Take by mouth, Disp: , Rfl:     metroNIDAZOLE (FLAGYL) 500 mg tablet, Take 500 mg by mouth every 12 (twelve) hours  , Disp: , Rfl:     neomycin (MYCIFRADIN) 500 mg tablet, Take 500 mg by mouth 2 (two) times a day  , Disp: , Rfl:     omeprazole (PriLOSEC) 40 MG capsule, Take 1 capsule (40 mg total) by mouth daily at bedtime, Disp: 90 capsule, Rfl: 3    polyethylene glycol (MIRALAX) 17 g packet, Take 17 g by mouth daily, Disp: , Rfl:     potassium chloride (KLOR-CON M20) 20 mEq tablet, Take 1 tablet by mouth 2 (two) times a day, Disp: , Rfl:     QUEtiapine (SEROQUEL) 300 mg tablet, 1 in the AM     ( also takes 400 mg at bedtime) , Disp: , Rfl:     QUEtiapine (SEROquel) 400 MG tablet, 1 at bedtime, Disp: , Rfl:     Blood Pressure Monitoring (BLOOD PRESSURE CUFF) MISC, by Does not apply route daily, Disp: 1 each, Rfl: 0    clomiPRAMINE (ANAFRANIL) 50 mg capsule, Take 50 mg by mouth daily at bedtime  , Disp: , Rfl:     Past Medical History:   Diagnosis Date    Ankle sprain     left    Anxiety disorder     Bipolar 2 disorder (HCC)     Chronic back pain     CKD (chronic kidney disease) stage 3, GFR 30-59 ml/min     Depression     Hypercholesteremia     Hyperlipidemia     Hypernatremia     Hypokalemia     Intervertebral disc disorder with radiculopathy of lumbosacral region     resolved: 2015    Panic attacks     Radiculitis     resolved: 2015    Secondary renal hyperparathyroidism ([facility] [address] )     Vitamin D deficiency      Past Surgical History:   Procedure Laterality Date    DILATION AND CURETTAGE OF UTERUS      INDUCED       surgically induced    IA ERCP DX COLLECTION SPECIMEN BRUSHING/WASHING N/A 2018    Procedure: ENDOSCOPIC RETROGRADE CHOLANGIOPANCREATOGRAPHY (ERCP); Surgeon: Nay Downey MD;  Location:  MAIN OR;  Service: Gastroenterology    TUBAL LIGATION Bilateral      Family History   Problem Relation Age of Onset    Bipolar disorder Mother     Mental illness Mother         depression    Heart disease Father     Hypertension Father     Other Family         Back disorder    Diabetes Family     Heart disease Family     Hypertension Family     Breast cancer Family     Stroke Family     Thyroid disease Family     Substance Abuse Neg Hx         neg fam hx      reports that she has never smoked  She has never used smokeless tobacco  She reports that she does not drink alcohol or use drugs        Physical Exam:   Vitals:    07/10/18 1129   BP: 158/80   BP Location: Left arm   Patient Position: Sitting   Cuff Size: Standard   Pulse: 70   Weight: 71 8 kg (158 lb 6 4 oz)   Height: 5' 6" (1 676 m)     Body mass index is 25 57 kg/m²  General: conscious, cooperative, in not acute distress  Eyes: conjunctivae pink, anicteric sclerae  ENT: lips and mucous membranes moist  Neck: supple, no JVD  Chest: clear breath sounds bilateral, no crackles, ronchus or wheezings  CVS: distinct S1 & S2, normal rate, regular rhythm  Abdomen: soft, non-tender, non-distended, normoactive bowel sounds  Extremities: no edema of both legs  Skin: no rash  Neuro: awake, alert, oriented      Lab Results:       most recent creatinine was 2 0   potassium 4 2    Portions of the record may have been created with voice recognition software  Occasional wrong word or "sound a like" substitutions may have occurred due to the inherent limitations of voice recognition software  Read the chart carefully and recognize, using context, where substitutions have occurred  If you have any questions, please contact the dictating provider

## 2018-07-11 ENCOUNTER — HOSPITAL ENCOUNTER (OUTPATIENT)
Dept: ULTRASOUND IMAGING | Facility: CLINIC | Age: 58
Discharge: HOME | End: 2018-07-11
Payer: MEDICARE

## 2018-07-11 ENCOUNTER — TELEPHONE (OUTPATIENT)
Dept: FAMILY MEDICINE CLINIC | Facility: HOSPITAL | Age: 58
End: 2018-07-11

## 2018-07-11 ENCOUNTER — TRANSCRIBE ORDERS (OUTPATIENT)
Dept: ADMINISTRATIVE | Facility: HOSPITAL | Age: 58
End: 2018-07-11

## 2018-07-11 DIAGNOSIS — Z00.01 ENCOUNTER FOR GENERAL ADULT MEDICAL EXAMINATION WITH ABNORMAL FINDINGS: ICD-10-CM

## 2018-07-11 DIAGNOSIS — Z00.01 ENCOUNTER FOR GENERAL ADULT MEDICAL EXAMINATION WITH ABNORMAL FINDINGS: Primary | ICD-10-CM

## 2018-07-11 DIAGNOSIS — R79.89 ELEVATED LFTS: ICD-10-CM

## 2018-07-11 PROCEDURE — 76705 ECHO EXAM OF ABDOMEN: CPT

## 2018-07-11 NOTE — TELEPHONE ENCOUNTER
I left message on am to call- her alk phosphatase is improving since may-down form over 200-   has normal alt and ast- saw me as new pt this spring- we had cared for her during prior hosptial stays for laxative abuse related constipation and pancreatitis- sees bux [facility] gi team as well    She has ckd stage 3- stable crt

## 2018-07-12 RX ORDER — MELATONIN
2000 DAILY
COMMUNITY
End: 2018-11-13 | Stop reason: ALTCHOICE

## 2018-07-13 ENCOUNTER — ANESTHESIA (OUTPATIENT)
Dept: PERIOP | Facility: HOSPITAL | Age: 58
DRG: 330 | End: 2018-07-13
Payer: MEDICARE

## 2018-07-13 ENCOUNTER — HOSPITAL ENCOUNTER (INPATIENT)
Facility: HOSPITAL | Age: 58
LOS: 4 days | Discharge: HOME | DRG: 330 | End: 2018-07-17
Attending: COLON & RECTAL SURGERY | Admitting: COLON & RECTAL SURGERY
Payer: MEDICARE

## 2018-07-13 DIAGNOSIS — K62.3 RECTAL PROLAPSE: ICD-10-CM

## 2018-07-13 DIAGNOSIS — I73.9 PAD (PERIPHERAL ARTERY DISEASE) (HCC): Primary | ICD-10-CM

## 2018-07-13 LAB — GLUCOSE SERPL-MCNC: 149 MG/DL (ref 65–140)

## 2018-07-13 PROCEDURE — 88307 TISSUE EXAM BY PATHOLOGIST: CPT | Performed by: PATHOLOGY

## 2018-07-13 PROCEDURE — 0DBN0ZZ EXCISION OF SIGMOID COLON, OPEN APPROACH: ICD-10-PCS | Performed by: COLON & RECTAL SURGERY

## 2018-07-13 PROCEDURE — 0DSP0ZZ REPOSITION RECTUM, OPEN APPROACH: ICD-10-PCS | Performed by: COLON & RECTAL SURGERY

## 2018-07-13 PROCEDURE — 0DJD8ZZ INSPECTION OF LOWER INTESTINAL TRACT, VIA NATURAL OR ARTIFICIAL OPENING ENDOSCOPIC: ICD-10-PCS | Performed by: COLON & RECTAL SURGERY

## 2018-07-13 PROCEDURE — 82948 REAGENT STRIP/BLOOD GLUCOSE: CPT

## 2018-07-13 PROCEDURE — 8E0W4CZ ROBOTIC ASSISTED PROCEDURE OF TRUNK REGION, PERCUTANEOUS ENDOSCOPIC APPROACH: ICD-10-PCS | Performed by: COLON & RECTAL SURGERY

## 2018-07-13 RX ORDER — ONDANSETRON 2 MG/ML
INJECTION INTRAMUSCULAR; INTRAVENOUS AS NEEDED
Status: DISCONTINUED | OUTPATIENT
Start: 2018-07-13 | End: 2018-07-13 | Stop reason: SURG

## 2018-07-13 RX ORDER — GLYCOPYRROLATE 0.2 MG/ML
INJECTION INTRAMUSCULAR; INTRAVENOUS AS NEEDED
Status: DISCONTINUED | OUTPATIENT
Start: 2018-07-13 | End: 2018-07-13 | Stop reason: SURG

## 2018-07-13 RX ORDER — FLUVOXAMINE MALEATE 50 MG/1
100 TABLET, COATED ORAL
Status: DISCONTINUED | OUTPATIENT
Start: 2018-07-13 | End: 2018-07-17 | Stop reason: HOSPADM

## 2018-07-13 RX ORDER — MIDAZOLAM HYDROCHLORIDE 1 MG/ML
INJECTION INTRAMUSCULAR; INTRAVENOUS AS NEEDED
Status: DISCONTINUED | OUTPATIENT
Start: 2018-07-13 | End: 2018-07-13 | Stop reason: SURG

## 2018-07-13 RX ORDER — FENTANYL CITRATE/PF 50 MCG/ML
25 SYRINGE (ML) INJECTION
Status: DISCONTINUED | OUTPATIENT
Start: 2018-07-13 | End: 2018-07-13 | Stop reason: HOSPADM

## 2018-07-13 RX ORDER — PROMETHAZINE HYDROCHLORIDE 25 MG/ML
25 INJECTION, SOLUTION INTRAMUSCULAR; INTRAVENOUS ONCE AS NEEDED
Status: DISCONTINUED | OUTPATIENT
Start: 2018-07-13 | End: 2018-07-13 | Stop reason: HOSPADM

## 2018-07-13 RX ORDER — SODIUM CHLORIDE, SODIUM LACTATE, POTASSIUM CHLORIDE, CALCIUM CHLORIDE 600; 310; 30; 20 MG/100ML; MG/100ML; MG/100ML; MG/100ML
INJECTION, SOLUTION INTRAVENOUS CONTINUOUS PRN
Status: DISCONTINUED | OUTPATIENT
Start: 2018-07-13 | End: 2018-07-13 | Stop reason: SURG

## 2018-07-13 RX ORDER — SODIUM CHLORIDE 9 MG/ML
125 INJECTION, SOLUTION INTRAVENOUS CONTINUOUS
Status: DISCONTINUED | OUTPATIENT
Start: 2018-07-13 | End: 2018-07-14

## 2018-07-13 RX ORDER — SODIUM CHLORIDE 9 MG/ML
INJECTION, SOLUTION INTRAVENOUS CONTINUOUS PRN
Status: DISCONTINUED | OUTPATIENT
Start: 2018-07-13 | End: 2018-07-13 | Stop reason: SURG

## 2018-07-13 RX ORDER — PROPOFOL 10 MG/ML
INJECTION, EMULSION INTRAVENOUS AS NEEDED
Status: DISCONTINUED | OUTPATIENT
Start: 2018-07-13 | End: 2018-07-13 | Stop reason: SURG

## 2018-07-13 RX ORDER — LAMOTRIGINE 100 MG/1
100 TABLET ORAL 3 TIMES DAILY
Status: DISCONTINUED | OUTPATIENT
Start: 2018-07-13 | End: 2018-07-17 | Stop reason: HOSPADM

## 2018-07-13 RX ORDER — SODIUM CHLORIDE 9 MG/ML
125 INJECTION, SOLUTION INTRAVENOUS CONTINUOUS
Status: DISCONTINUED | OUTPATIENT
Start: 2018-07-13 | End: 2018-07-13

## 2018-07-13 RX ORDER — FENTANYL CITRATE 50 UG/ML
INJECTION, SOLUTION INTRAMUSCULAR; INTRAVENOUS AS NEEDED
Status: DISCONTINUED | OUTPATIENT
Start: 2018-07-13 | End: 2018-07-13 | Stop reason: SURG

## 2018-07-13 RX ORDER — SODIUM CHLORIDE, SODIUM LACTATE, POTASSIUM CHLORIDE, CALCIUM CHLORIDE 600; 310; 30; 20 MG/100ML; MG/100ML; MG/100ML; MG/100ML
100 INJECTION, SOLUTION INTRAVENOUS CONTINUOUS
Status: DISCONTINUED | OUTPATIENT
Start: 2018-07-13 | End: 2018-07-14

## 2018-07-13 RX ORDER — DOCUSATE SODIUM 100 MG/1
100 CAPSULE, LIQUID FILLED ORAL 2 TIMES DAILY PRN
Status: DISCONTINUED | OUTPATIENT
Start: 2018-07-13 | End: 2018-07-17 | Stop reason: HOSPADM

## 2018-07-13 RX ORDER — ATORVASTATIN CALCIUM 40 MG/1
40 TABLET, FILM COATED ORAL DAILY
Status: DISCONTINUED | OUTPATIENT
Start: 2018-07-14 | End: 2018-07-17 | Stop reason: HOSPADM

## 2018-07-13 RX ORDER — QUETIAPINE FUMARATE 100 MG/1
400 TABLET, FILM COATED ORAL
Status: DISCONTINUED | OUTPATIENT
Start: 2018-07-14 | End: 2018-07-17 | Stop reason: HOSPADM

## 2018-07-13 RX ORDER — ONDANSETRON 2 MG/ML
4 INJECTION INTRAMUSCULAR; INTRAVENOUS ONCE AS NEEDED
Status: DISCONTINUED | OUTPATIENT
Start: 2018-07-13 | End: 2018-07-13 | Stop reason: HOSPADM

## 2018-07-13 RX ORDER — HEPARIN SODIUM 5000 [USP'U]/ML
5000 INJECTION, SOLUTION INTRAVENOUS; SUBCUTANEOUS EVERY 8 HOURS SCHEDULED
Status: DISCONTINUED | OUTPATIENT
Start: 2018-07-13 | End: 2018-07-17 | Stop reason: HOSPADM

## 2018-07-13 RX ORDER — LABETALOL HYDROCHLORIDE 5 MG/ML
INJECTION, SOLUTION INTRAVENOUS AS NEEDED
Status: DISCONTINUED | OUTPATIENT
Start: 2018-07-13 | End: 2018-07-13 | Stop reason: SURG

## 2018-07-13 RX ORDER — MAGNESIUM HYDROXIDE 1200 MG/15ML
LIQUID ORAL AS NEEDED
Status: DISCONTINUED | OUTPATIENT
Start: 2018-07-13 | End: 2018-07-13 | Stop reason: HOSPADM

## 2018-07-13 RX ORDER — ROCURONIUM BROMIDE 10 MG/ML
INJECTION, SOLUTION INTRAVENOUS AS NEEDED
Status: DISCONTINUED | OUTPATIENT
Start: 2018-07-13 | End: 2018-07-13 | Stop reason: SURG

## 2018-07-13 RX ORDER — ALBUMIN, HUMAN INJ 5% 5 %
SOLUTION INTRAVENOUS CONTINUOUS PRN
Status: DISCONTINUED | OUTPATIENT
Start: 2018-07-13 | End: 2018-07-13 | Stop reason: SURG

## 2018-07-13 RX ORDER — HEPARIN SODIUM 5000 [USP'U]/ML
5000 INJECTION, SOLUTION INTRAVENOUS; SUBCUTANEOUS ONCE
Status: COMPLETED | OUTPATIENT
Start: 2018-07-13 | End: 2018-07-13

## 2018-07-13 RX ORDER — FLUVOXAMINE MALEATE 50 MG/1
50 TABLET, COATED ORAL
Status: DISCONTINUED | OUTPATIENT
Start: 2018-07-13 | End: 2018-07-17 | Stop reason: HOSPADM

## 2018-07-13 RX ORDER — HYDROMORPHONE HYDROCHLORIDE 2 MG/ML
INJECTION, SOLUTION INTRAMUSCULAR; INTRAVENOUS; SUBCUTANEOUS AS NEEDED
Status: DISCONTINUED | OUTPATIENT
Start: 2018-07-13 | End: 2018-07-13 | Stop reason: SURG

## 2018-07-13 RX ORDER — DIPHENHYDRAMINE HYDROCHLORIDE 50 MG/ML
12.5 INJECTION INTRAMUSCULAR; INTRAVENOUS ONCE AS NEEDED
Status: DISCONTINUED | OUTPATIENT
Start: 2018-07-13 | End: 2018-07-13 | Stop reason: HOSPADM

## 2018-07-13 RX ORDER — LIDOCAINE HYDROCHLORIDE 10 MG/ML
INJECTION, SOLUTION INFILTRATION; PERINEURAL AS NEEDED
Status: DISCONTINUED | OUTPATIENT
Start: 2018-07-13 | End: 2018-07-13 | Stop reason: SURG

## 2018-07-13 RX ADMIN — MIDAZOLAM 2 MG: 1 INJECTION INTRAMUSCULAR; INTRAVENOUS at 16:06

## 2018-07-13 RX ADMIN — LIDOCAINE HYDROCHLORIDE 50 MG: 10 INJECTION, SOLUTION INFILTRATION; PERINEURAL at 16:11

## 2018-07-13 RX ADMIN — SODIUM CHLORIDE 125 ML/HR: 0.9 INJECTION, SOLUTION INTRAVENOUS at 20:20

## 2018-07-13 RX ADMIN — HYDROMORPHONE HYDROCHLORIDE 0.5 MG: 2 INJECTION, SOLUTION INTRAMUSCULAR; INTRAVENOUS; SUBCUTANEOUS at 17:55

## 2018-07-13 RX ADMIN — ROCURONIUM BROMIDE 10 MG: 10 INJECTION INTRAVENOUS at 17:55

## 2018-07-13 RX ADMIN — HYDROMORPHONE HYDROCHLORIDE: 10 INJECTION, SOLUTION INTRAMUSCULAR; INTRAVENOUS; SUBCUTANEOUS at 20:00

## 2018-07-13 RX ADMIN — CEFAZOLIN SODIUM 1000 MG: 1 SOLUTION INTRAVENOUS at 16:20

## 2018-07-13 RX ADMIN — ROCURONIUM BROMIDE 20 MG: 10 INJECTION INTRAVENOUS at 16:43

## 2018-07-13 RX ADMIN — NEOSTIGMINE METHYLSULFATE 3 MG: 1 INJECTION, SOLUTION INTRAMUSCULAR; INTRAVENOUS; SUBCUTANEOUS at 19:15

## 2018-07-13 RX ADMIN — ALBUMIN (HUMAN): 12.5 SOLUTION INTRAVENOUS at 18:33

## 2018-07-13 RX ADMIN — HYDROMORPHONE HYDROCHLORIDE 1 MG: 2 INJECTION, SOLUTION INTRAMUSCULAR; INTRAVENOUS; SUBCUTANEOUS at 16:47

## 2018-07-13 RX ADMIN — ROCURONIUM BROMIDE 20 MG: 10 INJECTION INTRAVENOUS at 17:18

## 2018-07-13 RX ADMIN — DEXAMETHASONE SODIUM PHOSPHATE 10 MG: 10 INJECTION INTRAMUSCULAR; INTRAVENOUS at 16:38

## 2018-07-13 RX ADMIN — SODIUM CHLORIDE: 0.9 INJECTION, SOLUTION INTRAVENOUS at 16:20

## 2018-07-13 RX ADMIN — SODIUM CHLORIDE 125 ML/HR: 0.9 INJECTION, SOLUTION INTRAVENOUS at 21:04

## 2018-07-13 RX ADMIN — ROCURONIUM BROMIDE 50 MG: 10 INJECTION INTRAVENOUS at 16:12

## 2018-07-13 RX ADMIN — SODIUM CHLORIDE 125 ML/HR: 0.9 INJECTION, SOLUTION INTRAVENOUS at 10:33

## 2018-07-13 RX ADMIN — SODIUM CHLORIDE, SODIUM LACTATE, POTASSIUM CHLORIDE, AND CALCIUM CHLORIDE: .6; .31; .03; .02 INJECTION, SOLUTION INTRAVENOUS at 16:00

## 2018-07-13 RX ADMIN — SODIUM CHLORIDE: 0.9 INJECTION, SOLUTION INTRAVENOUS at 17:10

## 2018-07-13 RX ADMIN — LAMOTRIGINE 100 MG: 100 TABLET ORAL at 22:47

## 2018-07-13 RX ADMIN — ALBUMIN (HUMAN): 12.5 SOLUTION INTRAVENOUS at 18:27

## 2018-07-13 RX ADMIN — GLYCOPYRROLATE 0.4 MG: 0.2 INJECTION, SOLUTION INTRAMUSCULAR; INTRAVENOUS at 19:15

## 2018-07-13 RX ADMIN — HEPARIN SODIUM 5000 UNITS: 5000 INJECTION, SOLUTION INTRAVENOUS; SUBCUTANEOUS at 16:10

## 2018-07-13 RX ADMIN — PROPOFOL 200 MG: 10 INJECTION, EMULSION INTRAVENOUS at 16:11

## 2018-07-13 RX ADMIN — METRONIDAZOLE 500 MG: 500 INJECTION, SOLUTION INTRAVENOUS at 16:24

## 2018-07-13 RX ADMIN — FENTANYL CITRATE 100 MCG: 50 INJECTION, SOLUTION INTRAMUSCULAR; INTRAVENOUS at 16:11

## 2018-07-13 RX ADMIN — ONDANSETRON 4 MG: 2 INJECTION INTRAMUSCULAR; INTRAVENOUS at 19:08

## 2018-07-13 RX ADMIN — HEPARIN SODIUM 5000 UNITS: 5000 INJECTION, SOLUTION INTRAVENOUS; SUBCUTANEOUS at 22:45

## 2018-07-13 RX ADMIN — SODIUM CHLORIDE 250 ML: 0.9 INJECTION, SOLUTION INTRAVENOUS at 10:02

## 2018-07-13 RX ADMIN — LABETALOL HYDROCHLORIDE 10 MG: 5 INJECTION, SOLUTION INTRAVENOUS at 19:27

## 2018-07-13 NOTE — ANESTHESIA PREPROCEDURE EVALUATION
Review of Systems/Medical History  Patient summary reviewed  Chart reviewed  No history of anesthetic complications     Cardiovascular  EKG reviewed, Negative cardio ROS Exercise tolerance (METS): good,  Hyperlipidemia,    Pulmonary  Negative pulmonary ROS        GI/Hepatic  Negative GI/hepatic ROS   GERD , Pancreatic problem,   Comment: Rectal prolapse from laxative abuse     Negative  ROS Chronic kidney disease (baseline Cr 1 7-1 9) stage 3,   Comment: Recent laxative abuse, use of lithium in past may contribute to CKD     Endo/Other  Negative endo/other ROS      GYN  Negative gynecology ROS          Hematology  Negative hematology ROS      Musculoskeletal    Arthritis     Neurology  Negative neurology ROS      Psychology   Negative psychology ROS Anxiety, Depression , bipolar disorder, Schizophrenia             Physical Exam    Airway    Mallampati score: II  TM Distance: >3 FB  Neck ROM: full     Dental   upper dentures and lower dentures,     Cardiovascular  Comment: Negative ROS,     Pulmonary      Other Findings        Anesthesia Plan  ASA Score- 2     Anesthesia Type- general with ASA Monitors  Additional Monitors:   Airway Plan: ETT  Comment: GA with ETT, IV, antiemetics, T/S  Bowel prepped before surgery  Plan Factors-    Induction- intravenous  Postoperative Plan- Plan for postoperative opioid use  Planned trial extubation    Informed Consent- Anesthetic plan and risks discussed with patient  I personally reviewed this patient with the CRNA  Discussed and agreed on the Anesthesia Plan with the CRNA           Lab Results   Component Value Date    WBC 3 51 (L) 07/02/2018    HGB 11 5 07/02/2018     07/02/2018     Lab Results   Component Value Date     07/02/2018    K 4 2 07/02/2018    BUN 20 07/02/2018    CREATININE 2 05 (H) 07/02/2018    GLUCOSE 97 03/20/2018       Blood type B    Lab Results   Component Value Date    HGBA1C 5 5 07/02/2018     Normal sinus rhythm  Incomplete right bundle branch block

## 2018-07-13 NOTE — OP NOTE
OPERATIVE REPORT  PATIENT NAME: Renee Diaz    :  1960  MRN: [id number]  Pt Location: [facility] OR ROOM 14    SURGERY DATE: 2018    Surgeon(s) and Role:     * Mercedes Delgado MD - Primary     Daniel Maldonado - Assisting    Preop Diagnosis:  Rectal prolapse [K62 3]    Post-Op Diagnosis Codes:     * Rectal prolapse [K62 3]    Procedure(s) (LRB):  ROBOTIC SIGMOID RESECTION / RECTOPEXY (N/A)  SIGMOIDOSCOPY FLEXIBLE (N/A)    Specimen(s):  ID Type Source Tests Collected by Time Destination   1 : SIGMOID COLON Tissue Large Intestine, Sigmoid Colon TISSUE EXAM Mercedes Delgado MD 2018 1818        Estimated Blood Loss:   100 mL    Drains:  Urethral Catheter Latex 16 Fr  (Active)   Number of days: 0       Anesthesia Type:   General    Operative Indications:  Rectal prolapse [K62 3]    Operative Findings:  Rectal prolapse [D09 9]    Complications:   None    Procedure and Technique:  Patient was placed in a supine position with legs in [employer]  The arms were cushioned with 2 layers of soft egg crate material and were wrapped at her sides using the draw sheet around cushion  The perineum was prepped including the vagina using Betadine  The abdomen was prepped widely using ChloraPrep and allowed to dry  The area was draped in a sterile manner  A time-out was done  Robotic surgery was prepared with placement of trocars using laparoscopic technique initially  Visiport technique was used to place the 1st trocar in the right supraumbilical area  This was done without any difficulty  No injuries were apparent  8 mm trocars were placed in the right lower quadrant near the ASIS, the right supraumbilical region, the left upper quadrant, and the left upper quadrant higher and lateral to the prior trocar site  A 5 mm trocar was placed in the right lateral abdomen for assistance  We positioned the patient and insure that we would have access to the targeted site   The robot was then docked and dissection was initiated  The rectum was obviously very much prolapsed  The sigmoid colon was extraordinarily redundant  There was tethering at the region of the pelvic brim where the iliacs crossed to the left  We left this intact so that we would have some pexy tethering of the descending colon to the anastomosis  The medial approach was used on the sigmoid mesentery to identify the avascular plane  The left ureter was identified through this avascular opening and the plane of dissection was extended into the retrorectal space  Posterior and posterolateral dissection was carried down to the pelvic floor at the levators adjacent to the anus  I documented this using palpation against a robotic instrument to confirm the dissection was down to the level of the anorectal junction  The lateral attachments were taken down to just above the lateral stalks but the stalks were not divided  The tissues were very loose and further dissection was not felt to be helpful to the patient  In addition, the pexy as the dissection stood at this time suggested that it would lift the vagina and bladder as well as the rectum  The mesentery of the rectum was transected at the sacral promontory  We then created a small Pfannenstiel incision in the lower abdomen across the midline  Dissection was carried down to the fascia which was divided in a horizontal plane  The midline was  between the rectus bellies and a GelPort was placed between the rectus muscles  The opening was very small but barely admitted a contour stapler which was placed across the rectum at the area of mesenteric dissection  The stapler was closed and after a 15 second delay, it was fired  The staple line appeared to be good  The sigmoid was extraordinarily redundant and was delivered through the wound  The mesentery was divided up to the level of the attachments mentioned above  This left a short area of mobility in the proximal sigmoid or descending colon  A pursestring device was used to place the 2 0 Prolene circumferential pursestring  The anvil of a 29 mm [facility] stapler device was placed after transection of the bowel  The specimen was sent for pathologic evaluation  Air was reinsufflated and laparoscopic method was used for the anastomosis  A double stapled anastomosis was created at the sacral promontory without any difficulty  There was no tension on the anastomosis  However the short length of the sigmoid allowed for a subtle hold of the bowel in the region of the sacral promontory as a support for the pexy  An air test was negative for leak  Sigmoidoscopy revealed viable bowel proximal and distal to the anastomosis which was widely patent and circumferentially intact  The donuts were circumferentially intact and thick  We then removed the GelPort lid and did a hand-sewn pexy using 0 Prolene suture placed in the area of the sacral promontory in the thick fascial tissues  The sutures on either side of the rectum were then attached to the redundant peritoneal surfaces well away from the bowel itself and tied down to the sacral promontory to hold the rectum in place  We recheck the anastomosis and found that there was no tension on it whatsoever  The bowel appeared viable and otherwise unremarkable  Once more we reinsufflated the abdomen to inspect the areas of the pexy and to make sure that the ureters were well away from the site of the pexy  The pexy was very near the midline on both sides  The left ureter was clearly seen entering the pelvis laterally to the pexy stitches  There was no impingement on the bowel from the pexy sutures  The pelvic structures appeared to be lifted nicely with the pexy as well  The areas of dissection were inspected for bleeding and found to be dry  The areas were irrigated and dried  The bowel was unremarkable upon a review  The trocars and GelPort were removed and the wounds were irrigated   The lower Pfannenstiel incision was closed at the peritoneal level using 0 Vicryl suture placed in a running fashion  The fascia was closed using a running 1 PDS suture  The wounds were irrigated again and the skin was closed using running or interrupted subcuticular 4 O Monocryl sutures depending on the length of the wound  Histoacryl was applied all the wounds  The patient tolerated the procedure well  Sponge needle and instrument counts were correct at the conclusion of the operation  Wand technique revealed no retained devices      I was present for the entire procedure    Patient Disposition:  PACU     SIGNATURE: Caitlin Wood MD  DATE: July 13, 2018  TIME: 7:30 PM

## 2018-07-14 LAB
ANION GAP SERPL CALCULATED.3IONS-SCNC: 8 MMOL/L (ref 4–13)
BASOPHILS # BLD AUTO: 0.02 THOUSANDS/ΜL (ref 0–0.1)
BASOPHILS NFR BLD AUTO: 0 % (ref 0–1)
BUN SERPL-MCNC: 21 MG/DL (ref 5–25)
CALCIUM SERPL-MCNC: 9.3 MG/DL (ref 8.3–10.1)
CHLORIDE SERPL-SCNC: 120 MMOL/L (ref 100–108)
CO2 SERPL-SCNC: 25 MMOL/L (ref 21–32)
CREAT SERPL-MCNC: 1.75 MG/DL (ref 0.6–1.3)
EOSINOPHIL # BLD AUTO: 0 THOUSAND/ΜL (ref 0–0.61)
EOSINOPHIL NFR BLD AUTO: 0 % (ref 0–6)
ERYTHROCYTE [DISTWIDTH] IN BLOOD BY AUTOMATED COUNT: 12.9 % (ref 11.6–15.1)
GFR SERPL CREATININE-BSD FRML MDRD: 32 ML/MIN/1.73SQ M
GLUCOSE SERPL-MCNC: 151 MG/DL (ref 65–140)
HCT VFR BLD AUTO: 34.3 % (ref 34.8–46.1)
HGB BLD-MCNC: 10.2 G/DL (ref 11.5–15.4)
IMM GRANULOCYTES # BLD AUTO: 0.03 THOUSAND/UL (ref 0–0.2)
IMM GRANULOCYTES NFR BLD AUTO: 0 % (ref 0–2)
LYMPHOCYTES # BLD AUTO: 0.41 THOUSANDS/ΜL (ref 0.6–4.47)
LYMPHOCYTES NFR BLD AUTO: 4 % (ref 14–44)
MAGNESIUM SERPL-MCNC: 2.3 MG/DL (ref 1.6–2.6)
MCH RBC QN AUTO: 30.4 PG (ref 26.8–34.3)
MCHC RBC AUTO-ENTMCNC: 29.7 G/DL (ref 31.4–37.4)
MCV RBC AUTO: 102 FL (ref 82–98)
MONOCYTES # BLD AUTO: 0.77 THOUSAND/ΜL (ref 0.17–1.22)
MONOCYTES NFR BLD AUTO: 8 % (ref 4–12)
NEUTROPHILS # BLD AUTO: 8.05 THOUSANDS/ΜL (ref 1.85–7.62)
NEUTS SEG NFR BLD AUTO: 88 % (ref 43–75)
NRBC BLD AUTO-RTO: 0 /100 WBCS
PLATELET # BLD AUTO: 227 THOUSANDS/UL (ref 149–390)
PMV BLD AUTO: 9.7 FL (ref 8.9–12.7)
POTASSIUM SERPL-SCNC: 4 MMOL/L (ref 3.5–5.3)
RBC # BLD AUTO: 3.36 MILLION/UL (ref 3.81–5.12)
SODIUM SERPL-SCNC: 153 MMOL/L (ref 136–145)
WBC # BLD AUTO: 9.28 THOUSAND/UL (ref 4.31–10.16)

## 2018-07-14 PROCEDURE — 94761 N-INVAS EAR/PLS OXIMETRY MLT: CPT

## 2018-07-14 PROCEDURE — 80048 BASIC METABOLIC PNL TOTAL CA: CPT | Performed by: SURGERY

## 2018-07-14 PROCEDURE — 83735 ASSAY OF MAGNESIUM: CPT | Performed by: SURGERY

## 2018-07-14 PROCEDURE — 94760 N-INVAS EAR/PLS OXIMETRY 1: CPT

## 2018-07-14 PROCEDURE — 85025 COMPLETE CBC W/AUTO DIFF WBC: CPT | Performed by: SURGERY

## 2018-07-14 RX ORDER — AMLODIPINE BESYLATE 5 MG/1
5 TABLET ORAL DAILY
Status: DISCONTINUED | OUTPATIENT
Start: 2018-07-14 | End: 2018-07-17 | Stop reason: HOSPADM

## 2018-07-14 RX ORDER — LABETALOL HYDROCHLORIDE 5 MG/ML
10 INJECTION, SOLUTION INTRAVENOUS EVERY 4 HOURS PRN
Status: DISCONTINUED | OUTPATIENT
Start: 2018-07-14 | End: 2018-07-17 | Stop reason: HOSPADM

## 2018-07-14 RX ORDER — DEXTROSE, SODIUM CHLORIDE, AND POTASSIUM CHLORIDE 5; .45; .15 G/100ML; G/100ML; G/100ML
75 INJECTION INTRAVENOUS CONTINUOUS
Status: DISCONTINUED | OUTPATIENT
Start: 2018-07-14 | End: 2018-07-16

## 2018-07-14 RX ADMIN — FLUVOXAMINE MALEATE 50 MG: 50 TABLET, FILM COATED ORAL at 21:19

## 2018-07-14 RX ADMIN — FLUVOXAMINE MALEATE 100 MG: 50 TABLET, FILM COATED ORAL at 21:19

## 2018-07-14 RX ADMIN — HEPARIN SODIUM 5000 UNITS: 5000 INJECTION, SOLUTION INTRAVENOUS; SUBCUTANEOUS at 05:22

## 2018-07-14 RX ADMIN — HEPARIN SODIUM 5000 UNITS: 5000 INJECTION, SOLUTION INTRAVENOUS; SUBCUTANEOUS at 13:02

## 2018-07-14 RX ADMIN — LABETALOL 20 MG/4 ML (5 MG/ML) INTRAVENOUS SYRINGE 10 MG: at 23:11

## 2018-07-14 RX ADMIN — ONDANSETRON 8 MG: 2 INJECTION INTRAMUSCULAR; INTRAVENOUS at 02:05

## 2018-07-14 RX ADMIN — HEPARIN SODIUM 5000 UNITS: 5000 INJECTION, SOLUTION INTRAVENOUS; SUBCUTANEOUS at 21:19

## 2018-07-14 RX ADMIN — DEXTROSE, SODIUM CHLORIDE, AND POTASSIUM CHLORIDE 75 ML/HR: 5; .45; .15 INJECTION INTRAVENOUS at 08:24

## 2018-07-14 RX ADMIN — ATORVASTATIN CALCIUM 40 MG: 40 TABLET, FILM COATED ORAL at 08:24

## 2018-07-14 RX ADMIN — QUETIAPINE 400 MG: 100 TABLET ORAL at 21:19

## 2018-07-14 RX ADMIN — LAMOTRIGINE 100 MG: 100 TABLET ORAL at 16:51

## 2018-07-14 RX ADMIN — ONDANSETRON 8 MG: 2 INJECTION INTRAMUSCULAR; INTRAVENOUS at 09:15

## 2018-07-14 RX ADMIN — LAMOTRIGINE 100 MG: 100 TABLET ORAL at 08:24

## 2018-07-14 RX ADMIN — DEXTROSE, SODIUM CHLORIDE, AND POTASSIUM CHLORIDE 75 ML/HR: 5; .45; .15 INJECTION INTRAVENOUS at 21:18

## 2018-07-14 RX ADMIN — SODIUM CHLORIDE 125 ML/HR: 0.9 INJECTION, SOLUTION INTRAVENOUS at 03:59

## 2018-07-14 RX ADMIN — Medication 0.04 MG: at 04:40

## 2018-07-14 RX ADMIN — AMLODIPINE BESYLATE 5 MG: 5 TABLET ORAL at 08:24

## 2018-07-14 RX ADMIN — ONDANSETRON 8 MG: 2 INJECTION INTRAMUSCULAR; INTRAVENOUS at 16:51

## 2018-07-14 RX ADMIN — LAMOTRIGINE 100 MG: 100 TABLET ORAL at 21:19

## 2018-07-14 NOTE — PROGRESS NOTES
Narcan 0 4mg given  Respiration rate down to 4/hr  Pt wakens fast and alert and oriented   Will monitor

## 2018-07-14 NOTE — PROGRESS NOTES
Progress Note - Roslyn Salinas 66 y o  female MRN: [id number]    Unit/Bed#: [facility] 830-01 Encounter: [id number]      Assessment:  Nausea and vomiting   Otherwise well after sigmoid resection and rectopexy    Plan:  Observe  Slow with po  OOB  Change to morphine  Subjective:   Nausea    Objective:     Vitals: Blood pressure (!) 185/89, pulse 94, temperature 98 2 °F (36 8 °C), temperature source Oral, resp  rate 18, height 5' 6" (1 676 m), weight 69 6 kg (153 lb 7 oz), SpO2 95 %, not currently breastfeeding  ,Body mass index is 24 77 kg/m²  Intake/Output Summary (Last 24 hours) at 07/14/18 1519  Last data filed at 07/14/18 1444   Gross per 24 hour   Intake          4380 19 ml   Output             7825 ml   Net         -3444 81 ml       Physical Exam:   Awake, alert, comfortable  Abdomen soft, flat, wounds well  Tenderness is mild  Invasive Devices     Peripheral Intravenous Line            Peripheral IV 07/13/18 Left Hand 1 day    Peripheral IV 07/13/18 Right Hand less than 1 day                Lab, Imaging and other studies: I have personally reviewed pertinent reports      VTE Pharmacologic Prophylaxis: Heparin  VTE Mechanical Prophylaxis: sequential compression device

## 2018-07-14 NOTE — SOCIAL WORK
Met with patient and pt aware cm role at discharge     Explained role of care management  Patient lives in a three story home with her 61year old sister, sisters children and grandchildren  No steps to enter  Her bedroom is on the third floor  She is independent adl's and ambulation, drives, is on SSI disability  DME - denies  Past services - Friends, Jocelyn  She sees her psychiatrist Dr Manny Gunn monthly, sees her therapist every two weeks, sees PCP monthly  She plans on returning home at discharge and does not anticipate any discharge needs, family will transport home   CM reviewed d/c planning process including the following: identifying help at home, patient preference for d/c planning needs, Discharge Lounge, [facility] Meds to Bed program, availability of treatment team to discuss questions or concerns patient and/or family may have regarding understanding medications and recognizing signs and symptoms once discharged  CM also encouraged patient to follow up with all recommended appointments after discharge  Patient advised of importance for patient and family to participate in managing patients medical well being  Discharge checklist discussed with patient and family

## 2018-07-14 NOTE — CASE MANAGEMENT
Initial Clinical Review  ELECTIVE INPATIENT ONLY SURGERY, CPT 90619       Age/Sex: 66 y o  female    Surgery Date: 7/13/18    Procedure: ROBOTIC SIGMOID RESECTION / RECTOPEXY   SIGMOIDOSCOPY FLEXIBLE     Anesthesia: general    Admission Orders: Date/Time/Statement: inpatient 7/13/18 @ 1930     No readmit review needed    Orders Placed This Encounter   Procedures    Inpatient Admission     Standing Status:   Standing     Number of Occurrences:   1     Order Specific Question:   Admitting Physician     Answer:   Marcial Coreas     Order Specific Question:   Level of Care     Answer:   Med Surg [16]     Order Specific Question:   Estimated length of stay     Answer:   More than 2 Midnights     Order Specific Question:   Certification     Answer:   I certify that inpatient services are medically necessary for this patient for a duration of greater than two midnights  See H&P and MD Progress Notes for additional information about the patient's course of treatment  Vital Signs: /81 (BP Location: Left arm)   Pulse 94   Temp 97 7 °F (36 5 °C) (Oral)   Resp 20   Ht 5' 6" (1 676 m)   Wt 69 6 kg (153 lb 7 oz)   LMP  (LMP Unknown)   SpO2 92%   BMI 24 77 kg/m²     Diet:        Diet Orders            Start     Ordered    07/14/18 0924  Diet NPO  Diet effective now     Question Answer Comment   Diet Type NPO    RD to adjust diet per protocol?  No        07/14/18 0924          Mobility: up as artem    DVT Prophylaxis: SQ heparin q8h, SCD's    Pain Control:   Pain Medications             acetaminophen (TYLENOL) 325 mg tablet Take 650 mg by mouth every 6 (six) hours as needed for mild pain     DILAUDID PCA pump  D5 1/2NS +20K 75/hr

## 2018-07-14 NOTE — PLAN OF CARE
DISCHARGE PLANNING     Discharge to home or other facility with appropriate resources Progressing        INFECTION - ADULT     Absence or prevention of progression during hospitalization Progressing        PAIN - ADULT     Verbalizes/displays adequate comfort level or baseline comfort level Progressing        Potential for Falls     Patient will remain free of falls Progressing        SAFETY ADULT     Patient will remain free of falls Progressing

## 2018-07-14 NOTE — PROGRESS NOTES
Pt vomited 300ml bile emesis; dr Caroleen Barthel aware; as per dr Caroleen Barthel, dr David Govea will be rounding on patient shortly

## 2018-07-14 NOTE — PROGRESS NOTES
Narcan given  Pt began to vomit a small amount of bile  She also had shakiness  Told the pt it was from the narcan  Will monitor  Respirations 16/min

## 2018-07-14 NOTE — PROGRESS NOTES
Into ambulate pt; pt declining at this time; states "i don't feel good   I'll get out of bed instead"; instructed the importance of being oob and ambulating; pt continues to decline ambulation at this time; pt oob in chair

## 2018-07-15 LAB
ANION GAP SERPL CALCULATED.3IONS-SCNC: 4 MMOL/L (ref 4–13)
BASOPHILS # BLD AUTO: 0.03 THOUSANDS/ΜL (ref 0–0.1)
BASOPHILS NFR BLD AUTO: 0 % (ref 0–1)
BUN SERPL-MCNC: 20 MG/DL (ref 5–25)
CALCIUM SERPL-MCNC: 9.8 MG/DL (ref 8.3–10.1)
CHLORIDE SERPL-SCNC: 123 MMOL/L (ref 100–108)
CO2 SERPL-SCNC: 26 MMOL/L (ref 21–32)
CREAT SERPL-MCNC: 1.81 MG/DL (ref 0.6–1.3)
EOSINOPHIL # BLD AUTO: 0 THOUSAND/ΜL (ref 0–0.61)
EOSINOPHIL NFR BLD AUTO: 0 % (ref 0–6)
ERYTHROCYTE [DISTWIDTH] IN BLOOD BY AUTOMATED COUNT: 13.2 % (ref 11.6–15.1)
GFR SERPL CREATININE-BSD FRML MDRD: 31 ML/MIN/1.73SQ M
GLUCOSE SERPL-MCNC: 131 MG/DL (ref 65–140)
HCT VFR BLD AUTO: 34.4 % (ref 34.8–46.1)
HGB BLD-MCNC: 10.3 G/DL (ref 11.5–15.4)
IMM GRANULOCYTES # BLD AUTO: 0.04 THOUSAND/UL (ref 0–0.2)
IMM GRANULOCYTES NFR BLD AUTO: 0 % (ref 0–2)
LYMPHOCYTES # BLD AUTO: 0.99 THOUSANDS/ΜL (ref 0.6–4.47)
LYMPHOCYTES NFR BLD AUTO: 9 % (ref 14–44)
MAGNESIUM SERPL-MCNC: 2.6 MG/DL (ref 1.6–2.6)
MCH RBC QN AUTO: 31 PG (ref 26.8–34.3)
MCHC RBC AUTO-ENTMCNC: 29.9 G/DL (ref 31.4–37.4)
MCV RBC AUTO: 104 FL (ref 82–98)
MONOCYTES # BLD AUTO: 1.18 THOUSAND/ΜL (ref 0.17–1.22)
MONOCYTES NFR BLD AUTO: 11 % (ref 4–12)
NEUTROPHILS # BLD AUTO: 8.32 THOUSANDS/ΜL (ref 1.85–7.62)
NEUTS SEG NFR BLD AUTO: 80 % (ref 43–75)
NRBC BLD AUTO-RTO: 0 /100 WBCS
PLATELET # BLD AUTO: 229 THOUSANDS/UL (ref 149–390)
PMV BLD AUTO: 10.2 FL (ref 8.9–12.7)
POTASSIUM SERPL-SCNC: 3.9 MMOL/L (ref 3.5–5.3)
RBC # BLD AUTO: 3.32 MILLION/UL (ref 3.81–5.12)
SODIUM SERPL-SCNC: 153 MMOL/L (ref 136–145)
WBC # BLD AUTO: 10.56 THOUSAND/UL (ref 4.31–10.16)

## 2018-07-15 PROCEDURE — G8979 MOBILITY GOAL STATUS: HCPCS | Performed by: PHYSICAL THERAPIST

## 2018-07-15 PROCEDURE — 94760 N-INVAS EAR/PLS OXIMETRY 1: CPT

## 2018-07-15 PROCEDURE — G8978 MOBILITY CURRENT STATUS: HCPCS | Performed by: PHYSICAL THERAPIST

## 2018-07-15 PROCEDURE — 83735 ASSAY OF MAGNESIUM: CPT | Performed by: SURGERY

## 2018-07-15 PROCEDURE — G8980 MOBILITY D/C STATUS: HCPCS | Performed by: PHYSICAL THERAPIST

## 2018-07-15 PROCEDURE — 85025 COMPLETE CBC W/AUTO DIFF WBC: CPT | Performed by: SURGERY

## 2018-07-15 PROCEDURE — 80048 BASIC METABOLIC PNL TOTAL CA: CPT | Performed by: SURGERY

## 2018-07-15 PROCEDURE — 97163 PT EVAL HIGH COMPLEX 45 MIN: CPT | Performed by: PHYSICAL THERAPIST

## 2018-07-15 RX ORDER — OXYCODONE HYDROCHLORIDE 5 MG/1
5 TABLET ORAL ONCE
Status: COMPLETED | OUTPATIENT
Start: 2018-07-15 | End: 2018-07-15

## 2018-07-15 RX ORDER — OXYCODONE HYDROCHLORIDE AND ACETAMINOPHEN 5; 325 MG/1; MG/1
1 TABLET ORAL EVERY 4 HOURS PRN
Status: DISCONTINUED | OUTPATIENT
Start: 2018-07-15 | End: 2018-07-17 | Stop reason: HOSPADM

## 2018-07-15 RX ORDER — HYDRALAZINE HYDROCHLORIDE 20 MG/ML
5 INJECTION INTRAMUSCULAR; INTRAVENOUS ONCE
Status: COMPLETED | OUTPATIENT
Start: 2018-07-15 | End: 2018-07-15

## 2018-07-15 RX ADMIN — ATORVASTATIN CALCIUM 40 MG: 40 TABLET, FILM COATED ORAL at 08:33

## 2018-07-15 RX ADMIN — LAMOTRIGINE 100 MG: 100 TABLET ORAL at 22:17

## 2018-07-15 RX ADMIN — OXYCODONE HYDROCHLORIDE AND ACETAMINOPHEN 1 TABLET: 5; 325 TABLET ORAL at 16:55

## 2018-07-15 RX ADMIN — QUETIAPINE 400 MG: 100 TABLET ORAL at 22:17

## 2018-07-15 RX ADMIN — AMLODIPINE BESYLATE 5 MG: 5 TABLET ORAL at 08:33

## 2018-07-15 RX ADMIN — LAMOTRIGINE 100 MG: 100 TABLET ORAL at 08:33

## 2018-07-15 RX ADMIN — HEPARIN SODIUM 5000 UNITS: 5000 INJECTION, SOLUTION INTRAVENOUS; SUBCUTANEOUS at 22:17

## 2018-07-15 RX ADMIN — LAMOTRIGINE 100 MG: 100 TABLET ORAL at 16:55

## 2018-07-15 RX ADMIN — HYDRALAZINE HYDROCHLORIDE 5 MG: 20 INJECTION INTRAMUSCULAR; INTRAVENOUS at 01:14

## 2018-07-15 RX ADMIN — HEPARIN SODIUM 5000 UNITS: 5000 INJECTION, SOLUTION INTRAVENOUS; SUBCUTANEOUS at 12:56

## 2018-07-15 RX ADMIN — OXYCODONE HYDROCHLORIDE AND ACETAMINOPHEN 1 TABLET: 5; 325 TABLET ORAL at 12:56

## 2018-07-15 RX ADMIN — HEPARIN SODIUM 5000 UNITS: 5000 INJECTION, SOLUTION INTRAVENOUS; SUBCUTANEOUS at 05:13

## 2018-07-15 RX ADMIN — FLUVOXAMINE MALEATE 50 MG: 50 TABLET, FILM COATED ORAL at 22:22

## 2018-07-15 RX ADMIN — DEXTROSE, SODIUM CHLORIDE, AND POTASSIUM CHLORIDE 100 ML/HR: 5; .45; .15 INJECTION INTRAVENOUS at 08:34

## 2018-07-15 RX ADMIN — FLUVOXAMINE MALEATE 100 MG: 50 TABLET, FILM COATED ORAL at 22:21

## 2018-07-15 RX ADMIN — DEXTROSE, SODIUM CHLORIDE, AND POTASSIUM CHLORIDE 75 ML/HR: 5; .45; .15 INJECTION INTRAVENOUS at 22:26

## 2018-07-15 RX ADMIN — OXYCODONE HYDROCHLORIDE 5 MG: 5 TABLET ORAL at 22:17

## 2018-07-15 NOTE — PROGRESS NOTES
Patient's BP elevated 182/100 despite receiving PRN dose of 10 mg of labetalol at 2311   One time dose of hydralazine 5 mg ordered by Dr Sharon Jose

## 2018-07-15 NOTE — PLAN OF CARE
Problem: PHYSICAL THERAPY ADULT  Goal: Performs mobility at highest level of function for planned discharge setting  See evaluation for individualized goals  Treatment/Interventions: Functional transfer training, Elevations, Bed mobility, Gait training, Spoke to nursing, Spoke to case management  Equipment Recommended: Other (Comment) (commode- PT reached out to CM)       See flowsheet documentation for full assessment, interventions and recommendations  Outcome: Completed Date Met: 07/15/18  Prognosis: Good  Problem List: Decreased strength, Decreased endurance, Decreased mobility, Decreased skin integrity, Pain  Assessment: Pt is a 66 y o  female admitted to [facility] on 7/13/2018 w/ Rectal prolapse; Patient has a complex PMH which includes: rectal prolapse, hypokalemia, bipolar 2 disorder, hypercholesteremia, spondylolisthesis at L5-S1 level, CKD (stage III), cyst of right kidney, Vitamin D deficiency, secondary renal hyperparathyroidism, herniated nucleus pulposus (L5-S1), idiopathic chronic pancreatitis, primary OA of right knee, age related OP and cardiac murmur  Pt exhibits significant impairments with weakness, decreased ROM, impaired balance, decreased endurance, gait deviations, pain, decreased activity tolerance, decreased functional mobility tolerance and decreased skin integrity; these impact independence with mobility, ADLs, and IADLs; Patient received a 100 on the objective measure of the Barthel Index which reveals no limitations;  therapy prognosis is impacted by relevant co morbidities as noted in evaluation; clinical presentation is currently unstable/unpredictable - Patient did very well with PT evaluation  However due to the complexity of her PMH, PT believes this patient is a high complexity case  Her lab results are also abnormal   These lab results could affect balance and cognition  Patient was mod I with bed mobility, transfers and ambulation as well as stair management  She will not require skilled acute PT services; PTA, pt was Independent with mobility, ADLs and IADLs  Upon discharge PT recommends home with family support  Barriers to Discharge: None     Recommendation: Home with family support     PT - OK to Discharge: Yes (when medically cleared)    See flowsheet documentation for full assessment

## 2018-07-15 NOTE — PHYSICAL THERAPY NOTE
PT EVALUATION  Patient identified by name,  and medical bracelet   07/15/18 1400   Note Type   Note type Eval only   Pain Assessment   Pain Assessment 0-10   Pain Score 8   Pain Type Surgical pain   Pain Location Abdomen   Pain Orientation Lower   Pain Descriptors Burning   Hospital Pain Intervention(s) Medication (See MAR)   Home Living   Type of Home House  (0 MILO)   Home Layout Multi-level;Bed/bath upstairs; Able to live on main level with bedroom/bathroom; Access;Stairs to enter with rails   Bathroom Shower/Tub Tub/shower unit   [address] chair   Bathroom Accessibility Accessible   Home Equipment Other (Comment)  (none)   Additional Comments PT reached out to CM for a possible commode because patient reported her toilet was low  PT believes this would beneficial in order to reduce tension on the abdomen  Prior Function   Level of [place] Independent with ADLs and functional mobility   Lives With Family; Other (Comment)  (sister, sister's children and sister's children children)   Receives Help From Family   ADL Assistance Independent   IADLs Independent   Falls in the last 6 months 0   Restrictions/Precautions   Weight Bearing Precautions Per Order No   Braces or Orthoses Other (Comment)  (none)   Other Precautions Pain;Multiple lines   General   Additional Pertinent History Patient has a complex medical history which includes bipolar disorder, HNP and recent surgery  Family/Caregiver Present No   Cognition   Overall Cognitive Status WFL   Arousal/Participation Alert   Orientation Level Oriented X4   Memory Within functional limits   Following Commands Follows all commands and directions without difficulty   Comments Patient was very pleasant and cooperative       RUE Assessment   RUE Assessment WFL   LUE Assessment   LUE Assessment WFL   RLE Assessment   RLE Assessment WFL   LLE Assessment   LLE Assessment WFL   Coordination   Movements are Fluid and Coordinated 1   Sensation WFL   Light Touch   RLE Light Touch Grossly intact   LLE Light Touch Grossly intact   Bed Mobility   Supine to Sit 6  Modified independent   Additional items HOB elevated; Increased time required   Sit to Supine 6  Modified independent   Additional items HOB elevated; Increased time required   Transfers   Sit to Stand 6  Modified independent   Additional items HOB elevated; Bedrails   Stand to Sit 6  Modified independent   Additional items Armrests   Toilet transfer 6  Modified independent   Additional items Increased time required;Raised toilet seat   Ambulation/Elevation   Gait pattern WNL   Gait Assistance 6  Modified independent   Additional items Other (Comment)  (PT toted IV tower for patient )   Assistive Device None   Distance 100 ft x 2    Stair Management Assistance 5  Supervision   Additional items Verbal cues; Increased time required   Stair Management Technique Alternating pattern; Foreward;Reciprocal;Other (Comment); One rail R  (no AD; R rail ascending, R rail descending)   Number of Stairs 15   Ramp Technique Not tested   Balance   Static Sitting Good   Dynamic Sitting Fair +   Static Standing Fair +   Dynamic Standing Fair +   Ambulatory Fair +   Endurance Deficit   Endurance Deficit Yes   Endurance Deficit Description 2* to fatigue, medical condition (recent surgery) and general deconditioning   Activity Tolerance   Activity Tolerance Patient limited by fatigue;Patient limited by pain   Medical Staff Made Aware CM made aware   Nurse Made Aware RN made aware   Assessment   Prognosis Good   Problem List Decreased strength;Decreased endurance;Decreased mobility; Decreased skin integrity;Pain   Assessment Pt is a 66 y o  [facility] w/ Rectal prolapse; Patient has a complex PMH which includes: rectal prolapse, hypokalemia, bipolar 2 disorder, hypercholesteremia, spondylolisthesis at L5-S1 level, CKD (stage III), cyst of right kidney, Vitamin D deficiency, secondary renal hyperparathyroidism, herniated nucleus pulposus (L5-S1), idiopathic chronic pancreatitis, primary OA of right knee, age related OP and cardiac murmur  Pt exhibits significant impairments with weakness, decreased ROM, impaired balance, decreased endurance, gait deviations, pain, decreased activity tolerance, decreased functional mobility tolerance and decreased skin integrity; these impact independence with mobility, ADLs, and IADLs; Patient received a 100 on the objective measure of the Barthel Index which reveals no limitations;  therapy prognosis is impacted by relevant co morbidities as noted in evaluation; clinical presentation is currently unstable/unpredictable - Patient did very well with PT evaluation  However due to the complexity of her PMH, PT believes this patient is a high complexity case  Her lab results are also abnormal   These lab results could affect balance and cognition  Patient was mod I with bed mobility, transfers and ambulation as well as stair management  She will not require skilled acute PT services; PTA, pt was Independent with mobility, ADLs and IADLs  Upon discharge PT recommends home with family support  Barriers to Discharge None   Goals   Patient Goals to go home    Plan   Treatment/Interventions Functional transfer training;Elevations; Bed mobility;Gait training;Spoke to nursing;Spoke to case management   PT Frequency Other (Comment)  (no acute skilled PT services required)   Recommendation   Recommendation Home with family support   Equipment Recommended Other (Comment)  (commode- PT reached out to CM)   PT - OK to Discharge Yes  (when medically cleared)   Additional Comments PT positioned call bell and phone within reach of patient upon leaving the room     Barthel Index   Feeding 10   Bathing 5   Grooming Score 5   Dressing Score 10   Bladder Score 10   Bowels Score 10   Toilet Use Score 10   Transfers (Bed/Chair) Score 15   Mobility (Level Surface) Score 15   Stairs Score 10   Barthel Index Score 100   Kim Morrsion, PT

## 2018-07-15 NOTE — PROGRESS NOTES
Progress Note - General Surgery   William Bey 66 y o  female MRN: [id number]  Unit/Bed#: [facility] 830-01 Encounter: [id number]    Assessment:  67 yo female now 2 Days Post-Op s/p sigmoid resection and rectopexy  Recovering well  Plan:  Diet NPO  OOB  PPx: SQH  HTN: Norvasc, PRN labetolol    Subjective/Objective   Subjective: Nausea and emesis yesterday  Made NPO  Ambulating  Afebrile  Pain controlled with PCA  No flatus or BM yet  Objective:  Blood pressure (!) 171/86, pulse 100, temperature 98 4 °F (36 9 °C), temperature source Oral, resp  rate 16, height 5' 6" (1 676 m), weight 69 6 kg (153 lb 7 oz), SpO2 95 %, not currently breastfeeding  ,Body mass index is 24 77 kg/m²  I/O       07/13 0701 - 07/14 0700 07/14 0701 - 07/15 0700    P  O   0    I V  (mL/kg) 3641 2 (52 3) 1545 (23 3)    IV Piggyback 736 7 100    Total Intake(mL/kg) 4377 9 (62 9) 1645 (24 8)    Urine (mL/kg/hr) 4700 4200 (2 6)    Emesis/NG output  1300    Blood 500     Total Output 5200 5500    Net -822 1 -3855          Unmeasured Emesis Occurrence 1 x 4 x            Invasive Devices     Peripheral Intravenous Line            Peripheral IV 07/13/18 Left Hand 1 day    Peripheral IV 07/13/18 Right Hand 1 day                Physical Exam:  Gen: NAD, A&O, Comfortable in Bed  Chest: Normal work of breathing, no resp distress  Abd: S, ND, NT, Incisions CDI   Ext: No edema  Skin: warm, dry, intact    Lab, Imaging and other studies:  Recent Labs      07/14/18   0533   WBC  9 28   HGB  10 2*   PLT  227     Recent Labs      07/14/18   0533   NA  153*   K  4 0   CO2  25   CREATININE  1 75*   MG  2 3   CALCIUM  9 3           VTE Pharmacologic Prophylaxis: Heparin  VTE Mechanical Prophylaxis: sequential compression device

## 2018-07-15 NOTE — PLAN OF CARE
DISCHARGE PLANNING     Discharge to home or other facility with appropriate resources Progressing        DISCHARGE PLANNING - CARE MANAGEMENT     Discharge to post-acute care or home with appropriate resources Progressing        INFECTION - ADULT     Absence or prevention of progression during hospitalization Progressing        PAIN - ADULT     Verbalizes/displays adequate comfort level or baseline comfort level Progressing        Potential for Falls     Patient will remain free of falls Progressing        SAFETY ADULT     Patient will remain free of falls Progressing

## 2018-07-16 LAB
ANION GAP SERPL CALCULATED.3IONS-SCNC: 5 MMOL/L (ref 4–13)
BUN SERPL-MCNC: 15 MG/DL (ref 5–25)
CALCIUM SERPL-MCNC: 9.3 MG/DL (ref 8.3–10.1)
CHLORIDE SERPL-SCNC: 111 MMOL/L (ref 100–108)
CO2 SERPL-SCNC: 27 MMOL/L (ref 21–32)
CREAT SERPL-MCNC: 1.68 MG/DL (ref 0.6–1.3)
ERYTHROCYTE [DISTWIDTH] IN BLOOD BY AUTOMATED COUNT: 12.9 % (ref 11.6–15.1)
GFR SERPL CREATININE-BSD FRML MDRD: 34 ML/MIN/1.73SQ M
GLUCOSE SERPL-MCNC: 176 MG/DL (ref 65–140)
HCT VFR BLD AUTO: 34 % (ref 34.8–46.1)
HGB BLD-MCNC: 10.2 G/DL (ref 11.5–15.4)
MCH RBC QN AUTO: 31 PG (ref 26.8–34.3)
MCHC RBC AUTO-ENTMCNC: 30 G/DL (ref 31.4–37.4)
MCV RBC AUTO: 103 FL (ref 82–98)
PLATELET # BLD AUTO: 202 THOUSANDS/UL (ref 149–390)
PMV BLD AUTO: 10 FL (ref 8.9–12.7)
POTASSIUM SERPL-SCNC: 4 MMOL/L (ref 3.5–5.3)
RBC # BLD AUTO: 3.29 MILLION/UL (ref 3.81–5.12)
SODIUM SERPL-SCNC: 143 MMOL/L (ref 136–145)
WBC # BLD AUTO: 8.99 THOUSAND/UL (ref 4.31–10.16)

## 2018-07-16 PROCEDURE — 80048 BASIC METABOLIC PNL TOTAL CA: CPT | Performed by: ORTHOPAEDIC SURGERY

## 2018-07-16 PROCEDURE — 85027 COMPLETE CBC AUTOMATED: CPT | Performed by: ORTHOPAEDIC SURGERY

## 2018-07-16 RX ADMIN — HEPARIN SODIUM 5000 UNITS: 5000 INJECTION, SOLUTION INTRAVENOUS; SUBCUTANEOUS at 21:42

## 2018-07-16 RX ADMIN — ATORVASTATIN CALCIUM 40 MG: 40 TABLET, FILM COATED ORAL at 08:17

## 2018-07-16 RX ADMIN — HEPARIN SODIUM 5000 UNITS: 5000 INJECTION, SOLUTION INTRAVENOUS; SUBCUTANEOUS at 05:32

## 2018-07-16 RX ADMIN — OXYCODONE HYDROCHLORIDE AND ACETAMINOPHEN 1 TABLET: 5; 325 TABLET ORAL at 22:11

## 2018-07-16 RX ADMIN — OXYCODONE HYDROCHLORIDE AND ACETAMINOPHEN 1 TABLET: 5; 325 TABLET ORAL at 17:55

## 2018-07-16 RX ADMIN — FLUVOXAMINE MALEATE 50 MG: 50 TABLET, FILM COATED ORAL at 21:43

## 2018-07-16 RX ADMIN — HEPARIN SODIUM 5000 UNITS: 5000 INJECTION, SOLUTION INTRAVENOUS; SUBCUTANEOUS at 13:22

## 2018-07-16 RX ADMIN — LAMOTRIGINE 100 MG: 100 TABLET ORAL at 17:16

## 2018-07-16 RX ADMIN — AMLODIPINE BESYLATE 5 MG: 5 TABLET ORAL at 08:18

## 2018-07-16 RX ADMIN — LAMOTRIGINE 100 MG: 100 TABLET ORAL at 08:18

## 2018-07-16 RX ADMIN — LAMOTRIGINE 100 MG: 100 TABLET ORAL at 21:42

## 2018-07-16 RX ADMIN — OXYCODONE HYDROCHLORIDE AND ACETAMINOPHEN 1 TABLET: 5; 325 TABLET ORAL at 05:31

## 2018-07-16 RX ADMIN — FLUVOXAMINE MALEATE 100 MG: 50 TABLET, FILM COATED ORAL at 21:43

## 2018-07-16 RX ADMIN — OXYCODONE HYDROCHLORIDE AND ACETAMINOPHEN 1 TABLET: 5; 325 TABLET ORAL at 13:53

## 2018-07-16 RX ADMIN — OXYCODONE HYDROCHLORIDE AND ACETAMINOPHEN 1 TABLET: 5; 325 TABLET ORAL at 09:47

## 2018-07-16 RX ADMIN — QUETIAPINE 400 MG: 100 TABLET ORAL at 21:42

## 2018-07-16 NOTE — PROGRESS NOTES
Progress Note - General Surgery   Lisette Rosas 66 y o  female MRN: [id number]  Unit/Bed#: [facility] 830-01 Encounter: [id number]    Assessment:  58F w/rectal prolapse s/p robotic sigmoid resection and rectopexy 7/13  Plan:  - regular diet  - d/c IVF  - prn pain control  - OOB/ambulate  - dispo planning, likely home tomorrow      Subjective/Objective   Subjective: no flatus or BM  Has not eaten any solids yet, only liquids  Ambulated for the first time yesterday  Objective:    Blood pressure (!) 172/84, pulse 104, temperature 98 9 °F (37 2 °C), temperature source Oral, resp  rate 20, height 5' 6" (1 676 m), weight 66 3 kg (146 lb 2 6 oz), SpO2 94 %, not currently breastfeeding  ,Body mass index is 23 59 kg/m²  I/O last 24 hours:   In: 3647 8 [P O :765; I V :2882 8]  Out: 6525 [Urine:6525]    Invasive Devices     Peripheral Intravenous Line            Peripheral IV 07/13/18 Left Hand 2 days    Peripheral IV 07/13/18 Right Hand 2 days                Physical Exam:   NAD  Norm resp effort on RA  RRR  Abd soft, incisional tenderness, ND, incisions cdi closed with histoacryl  -c/c/e    Lab, Imaging and other studies:  Lab Results   Component Value Date    WBC 10 56 (H) 07/15/2018    HGB 10 3 (L) 07/15/2018    HCT 34 4 (L) 07/15/2018     (H) 07/15/2018     07/15/2018      Lab Results   Component Value Date    GLUCOSE 131 07/15/2018    CALCIUM 9 8 07/15/2018     (H) 07/15/2018    K 3 9 07/15/2018    CO2 26 07/15/2018     (H) 07/15/2018    BUN 20 07/15/2018    CREATININE 1 81 (H) 07/15/2018       VTE Pharmacologic Prophylaxis: Heparin  VTE Mechanical Prophylaxis: sequential compression device

## 2018-07-17 VITALS
OXYGEN SATURATION: 93 % | RESPIRATION RATE: 18 BRPM | DIASTOLIC BLOOD PRESSURE: 75 MMHG | HEIGHT: 66 IN | BODY MASS INDEX: 23.81 KG/M2 | TEMPERATURE: 98.2 F | HEART RATE: 88 BPM | WEIGHT: 148.15 LBS | SYSTOLIC BLOOD PRESSURE: 135 MMHG

## 2018-07-17 RX ORDER — OXYCODONE HYDROCHLORIDE AND ACETAMINOPHEN 5; 325 MG/1; MG/1
1 TABLET ORAL EVERY 4 HOURS PRN
Qty: 20 TABLET | Refills: 0 | Status: SHIPPED | OUTPATIENT
Start: 2018-07-17 | End: 2018-07-27

## 2018-07-17 RX ORDER — DOCUSATE SODIUM 100 MG/1
100 CAPSULE, LIQUID FILLED ORAL 2 TIMES DAILY PRN
Qty: 10 CAPSULE | Refills: 0
Start: 2018-07-17 | End: 2018-07-30 | Stop reason: ALTCHOICE

## 2018-07-17 RX ADMIN — ATORVASTATIN CALCIUM 40 MG: 40 TABLET, FILM COATED ORAL at 08:06

## 2018-07-17 RX ADMIN — OXYCODONE HYDROCHLORIDE AND ACETAMINOPHEN 1 TABLET: 5; 325 TABLET ORAL at 08:06

## 2018-07-17 RX ADMIN — LAMOTRIGINE 100 MG: 100 TABLET ORAL at 08:06

## 2018-07-17 RX ADMIN — HEPARIN SODIUM 5000 UNITS: 5000 INJECTION, SOLUTION INTRAVENOUS; SUBCUTANEOUS at 05:39

## 2018-07-17 RX ADMIN — AMLODIPINE BESYLATE 5 MG: 5 TABLET ORAL at 08:07

## 2018-07-17 NOTE — PROGRESS NOTES
Progress Note - Colorectal   Lisette Rosas 66 y o  female MRN: [id number]  Unit/Bed#: [facility] 830-01 Encounter: [id number]      Objective: Doing well, tolerating a house diet, passing flatus, had bowel movement  Incisional pain, no nausea, no emesis  OOB and ambulating halls  Hypernatremia corrected  Voiding well on own     1500 + 1 UA  1 BM    Blood pressure 138/76, pulse 96, temperature 98 5 °F (36 9 °C), temperature source Oral, resp  rate 16, height 5' 6" (1 676 m), weight 67 2 kg (148 lb 2 4 oz), SpO2 92 %, not currently breastfeeding  ,Body mass index is 23 91 kg/m²  Intake/Output Summary (Last 24 hours) at 07/17/18 0509  Last data filed at 07/16/18 2226   Gross per 24 hour   Intake             3178 ml   Output             2100 ml   Net             1078 ml       Invasive Devices     Peripheral Intravenous Line            Peripheral IV 07/13/18 Left Hand 3 days    Peripheral IV 07/13/18 Right Hand 3 days                Physical Exam:   Abdomen: soft, non distended, pfannenstiel incision clean and dry, trochar sites clean and dry    Extremities: no calf tenderness    Lab, Imaging and other studies:  None pending  VTE Pharmacologic Prophylaxis: Heparin  VTE Mechanical Prophylaxis: sequential compression device    Assessment:  POD # 4 Robotic sigmoidectomy, rectopexy    Plan:  Home today

## 2018-07-17 NOTE — PLAN OF CARE
DISCHARGE PLANNING     Discharge to home or other facility with appropriate resources Completed        DISCHARGE PLANNING - CARE MANAGEMENT     Discharge to post-acute care or home with appropriate resources Completed        INFECTION - ADULT     Absence or prevention of progression during hospitalization Completed        PAIN - ADULT     Verbalizes/displays adequate comfort level or baseline comfort level Completed        Potential for Falls     Patient will remain free of falls Completed        SAFETY ADULT     Patient will remain free of falls Completed

## 2018-07-17 NOTE — DISCHARGE SUMMARY
Discharge Summary - Colorectal Surgery   Lisette Rosas 66 y o  female MRN: [id number]  Unit/Bed#: [facility] 830-01 Encounter: [id number]        Admitting Diagnosis: Rectal prolapse    Admit Date: 7/13/18    Discharge Diagnosis: Same    Discharge Date: 7/17/18    HPI: This is a pleasant 67 yo woman who has had a rectal prolapse and now is having trouble reducing the prolapse  Patient is admitted for surgical intervention  Procedures Performed:   7/13/18 Robotic sigmoid colon resection, rectopexy - Dr Camryn Carvajal Course: Patient has done extremely well post operatively  She was pain controlled throughout the admission  Her abdominal wounds have stayed clean and dry, her pfannenstiel incision is well closed  She was able to be advanced from a clear liquid diet to a house diet without problems  Patient is ambulating the halls well, passing flatus and having bowel movements  Patient will be discharged home today and followed with Dr Jovana Fang in 4 weeks  Discharge instructions were given to the patient  Pathology pending    Complications: None      Condition at Discharge: good     Discharge instructions/Information to patient and family:   See after visit summary for information provided to patient and family  Provisions for Follow-Up Care:  See after visit summary for information related to follow-up care and any pertinent home health orders  Disposition: Home    Planned Readmission: No    Discharge Statement   I spent 30 minutes discharging the patient  This time was spent on the day of discharge  I had direct contact with the patient on the day of discharge  Additional documentation is required if more than 30 minutes were spent on discharge  Discharge Medications:  See after visit summary for reconciled discharge medications provided to patient and family

## 2018-07-18 ENCOUNTER — TRANSITIONAL CARE MANAGEMENT (OUTPATIENT)
Dept: FAMILY MEDICINE CLINIC | Facility: HOSPITAL | Age: 58
End: 2018-07-18

## 2018-07-30 ENCOUNTER — OFFICE VISIT (OUTPATIENT)
Dept: FAMILY MEDICINE CLINIC | Facility: HOSPITAL | Age: 58
End: 2018-07-30
Payer: MEDICARE

## 2018-07-30 VITALS
WEIGHT: 156 LBS | SYSTOLIC BLOOD PRESSURE: 106 MMHG | BODY MASS INDEX: 25.07 KG/M2 | HEIGHT: 66 IN | HEART RATE: 93 BPM | DIASTOLIC BLOOD PRESSURE: 68 MMHG

## 2018-07-30 DIAGNOSIS — F31.81 BIPOLAR 2 DISORDER (HCC): Chronic | ICD-10-CM

## 2018-07-30 DIAGNOSIS — N25.81 SECONDARY RENAL HYPERPARATHYROIDISM (HCC): ICD-10-CM

## 2018-07-30 DIAGNOSIS — N18.30 CHRONIC KIDNEY DISEASE, STAGE 3 (HCC): ICD-10-CM

## 2018-07-30 DIAGNOSIS — E87.6 HYPOKALEMIA: ICD-10-CM

## 2018-07-30 DIAGNOSIS — E78.00 HYPERCHOLESTEREMIA: Chronic | ICD-10-CM

## 2018-07-30 DIAGNOSIS — K62.3 RECTAL PROLAPSE: Primary | ICD-10-CM

## 2018-07-30 PROCEDURE — 99495 TRANSJ CARE MGMT MOD F2F 14D: CPT | Performed by: INTERNAL MEDICINE

## 2018-07-30 NOTE — ASSESSMENT & PLAN NOTE
Seen in July 2018- hx lithium damage and dehydration damage to kidneys  crt 1 6 when leaving hospital

## 2018-07-30 NOTE — PATIENT INSTRUCTIONS
Stay well hydrated  Increase veggies and fiber in diet and good protein to help heal    See Dr Shirley Iglesias as scheduled for followup appt

## 2018-07-30 NOTE — PROGRESS NOTES
Assessment/Plan:     Problem List Items Addressed This Visit        Digestive    Rectal prolapse - Primary     having daily bm- told to take colace as needed- will have her take one daily            Endocrine    Secondary renal hyperparathyroidism (HCC)       Genitourinary    Chronic kidney disease, stage 3     Seen in July 2018- hx lithium damage to             Other    Bipolar 2 disorder (HCC) (Chronic)    Hypercholesteremia (Chronic)           Subjective:     Patient ID: Savannah Maldonado is a 66 y o  female    1  Rectal prolapse- had surgery with Dr Jono Lawrence on 7/13 at [facility]  With colon resection of  25 cm of redundant sigmoind colon  She is having some tenderness of lower abdominal  Incisions- " I have never had surgery before and unsure what to expect  Has appt in 2 weeks with surgery team   No reported drainage from the incisions  Had to be given Narcan after surgery due to excess sedation  No vomiting or nausea and eating well since she is home  Just started walking steps on Saturday= has 24 steps up to her bedroom - just started walking those 2 days ago  Review of Systems   Constitutional: Negative for chills and fever  HENT: Negative for congestion  Psychiatric/Behavioral:        She is depressed about her weight- encouraged her to discuss with her counselor next month- reported she should be concentrating on healthy diet to heal from her surgery           Current Outpatient Prescriptions:     acetaminophen (TYLENOL) 325 mg tablet, Take 650 mg by mouth every 6 (six) hours as needed for mild pain, Disp: , Rfl:     amLODIPine (NORVASC) 5 mg tablet, Take 1 tablet (5 mg total) by mouth daily, Disp: 30 tablet, Rfl: 3    atorvastatin (LIPITOR) 40 mg tablet, Take 1 tablet by mouth daily, Disp: , Rfl:     Blood Pressure Monitoring (BLOOD PRESSURE CUFF) MISC, by Does not apply route daily, Disp: 1 each, Rfl: 0    cholecalciferol (VITAMIN D3) 1,000 units tablet, Take 2,000 Units by mouth daily, Disp: , Rfl:     clomiPRAMINE (ANAFRANIL) 50 mg capsule, Take 50 mg by mouth daily at bedtime  , Disp: , Rfl:     clonazePAM (KLONOPIN) 0 5 mg tablet, Take by mouth 3 (three) times a day  , Disp: , Rfl:     lamoTRIgine (LaMICtal) 100 mg tablet, Take 100 mg by mouth 3 (three) times a day Takes at hs, Disp: , Rfl:     omeprazole (PriLOSEC) 40 MG capsule, Take 1 capsule (40 mg total) by mouth daily at bedtime, Disp: 90 capsule, Rfl: 3    polyethylene glycol (MIRALAX) 17 g packet, Take 17 g by mouth daily, Disp: , Rfl:     potassium chloride (KLOR-CON M20) 20 mEq tablet, Take 1 tablet by mouth 2 (two) times a day, Disp: , Rfl:     QUEtiapine (SEROQUEL) 300 mg tablet, 1 in the AM     ( also takes 400 mg at bedtime) , Disp: , Rfl:     QUEtiapine (SEROquel) 400 MG tablet, 1 at bedtime, Disp: , Rfl:     Objective:    Physical Exam   Constitutional: She is oriented to person, place, and time  She appears well-developed and well-nourished  No distress  HENT:   Head: Normocephalic  Right Ear: External ear normal    Left Ear: External ear normal    Eyes: EOM are normal  Pupils are equal, round, and reactive to light  Left eye exhibits no discharge  Neck: Normal range of motion  Neck supple  No JVD present  Cardiovascular: Normal rate and regular rhythm  Exam reveals no friction rub  No murmur heard  Pulmonary/Chest: Effort normal and breath sounds normal  No respiratory distress  She has no wheezes  Abdominal: Soft  Bowel sounds are normal  She exhibits no distension  There is tenderness  Suprapubic incision without erythema  Right lateral portion of incision with small open area 4mm - nopurulent draiange   Musculoskeletal: She exhibits no edema, tenderness or deformity  Neurological: She is alert and oriented to person, place, and time  She displays normal reflexes  Coordination normal    Skin: Skin is warm and dry  No erythema  No pallor  Nursing note and vitals reviewed        Vitals:    07/30/18 1000   BP: 106/68   Pulse: 93       Transitional Care Management Review:  During the TCM phone call patient stated:         Date and time hospital follow up call was made:  7/19/2018 11:55 AM  Hospital care reviewed:  Records reviewed  Patient was hopsitalized at:  [facility]  Date of admission:  7/13/18  Date of discharge:  7/17/18  Diagnosis:  Tectal prolapse   Disposition:  Home  Were the patients medicaitons reviewed and updated:  Yes  Current symptoms:  None  Should patient be enrolled in anticoag monitoring?:  No  Scheduled for follow up?:  Yes  Patients specialists:  Other (comment)  Other specialists Name:  nel Rodney MD - Colon and Rectal surgery   Did you obtain your prescribed medications:  Yes  Do you need help managing your perscriptions or medications:  No  Is transportation to your appointments needed:  No  I have advised the patient to call PCP with any new or worsening symptoms (please type in name along with any credentials):   CYNTHIA Muhammad MA   Living Arrangements:  Spouse or Significiant other  Support System:  Spouse, Friends, Family  Are you recieving outpatient services:  No  Are you recieving home care services:  No  Comments:  PT's doing OK - brunilda Harp, DO

## 2018-07-31 ENCOUNTER — TELEPHONE (OUTPATIENT)
Dept: FAMILY MEDICINE CLINIC | Facility: HOSPITAL | Age: 58
End: 2018-07-31

## 2018-08-09 ENCOUNTER — PATIENT OUTREACH (OUTPATIENT)
Dept: FAMILY MEDICINE CLINIC | Facility: HOSPITAL | Age: 58
End: 2018-08-09

## 2018-08-09 NOTE — PROGRESS NOTES
Reached out to patient to introduce myself and the outpatient care management program  She reports that she is doing ok, and that all she needs is a "speedy recovery"  She was very pleasant  At this time she does not feel she would benefit from care coordination  I told her to not hesitate to reach out in the future if she thinks I can be of any assistance  She agreed she would

## 2018-09-26 DIAGNOSIS — E78.5 HYPERLIPIDEMIA, UNSPECIFIED HYPERLIPIDEMIA TYPE: Primary | ICD-10-CM

## 2018-09-26 RX ORDER — ATORVASTATIN CALCIUM 40 MG/1
40 TABLET, FILM COATED ORAL DAILY
Qty: 90 TABLET | Refills: 3 | Status: SHIPPED | OUTPATIENT
Start: 2018-09-26 | End: 2018-11-13 | Stop reason: SDUPTHER

## 2018-11-04 DIAGNOSIS — I10 ESSENTIAL HYPERTENSION: ICD-10-CM

## 2018-11-05 ENCOUNTER — TELEPHONE (OUTPATIENT)
Dept: NEPHROLOGY | Facility: CLINIC | Age: 58
End: 2018-11-05

## 2018-11-05 RX ORDER — AMLODIPINE BESYLATE 5 MG/1
TABLET ORAL
Qty: 30 TABLET | Refills: 3 | Status: SHIPPED | OUTPATIENT
Start: 2018-11-05 | End: 2018-11-13

## 2018-11-05 NOTE — TELEPHONE ENCOUNTER
Spoke to patient she is scheduled for 11/13/18 at 12 pm      Okay per Saroj Otriz supervisor to use same day

## 2018-11-05 NOTE — TELEPHONE ENCOUNTER
Patient canceled appt for 11/7/18  Would like to reschedule  I did not find an appt until December 24,18 would you please advise and help find her an earlier one

## 2018-11-12 ENCOUNTER — APPOINTMENT (OUTPATIENT)
Dept: LAB | Facility: HOSPITAL | Age: 58
End: 2018-11-12
Attending: INTERNAL MEDICINE
Payer: MEDICARE

## 2018-11-12 LAB
ALBUMIN SERPL BCP-MCNC: 4 G/DL (ref 3.5–5)
ALP SERPL-CCNC: 173 U/L (ref 46–116)
ALT SERPL W P-5'-P-CCNC: 87 U/L (ref 12–78)
ANION GAP SERPL CALCULATED.3IONS-SCNC: 9 MMOL/L (ref 4–13)
AST SERPL W P-5'-P-CCNC: 46 U/L (ref 5–45)
BACTERIA UR QL AUTO: ABNORMAL /HPF
BILIRUB SERPL-MCNC: 0.2 MG/DL (ref 0.2–1)
BILIRUB UR QL STRIP: NEGATIVE
BUN SERPL-MCNC: 28 MG/DL (ref 5–25)
CALCIUM SERPL-MCNC: 9.2 MG/DL (ref 8.3–10.1)
CHLORIDE SERPL-SCNC: 109 MMOL/L (ref 100–108)
CLARITY UR: CLEAR
CO2 SERPL-SCNC: 28 MMOL/L (ref 21–32)
COLOR UR: YELLOW
CREAT SERPL-MCNC: 2.06 MG/DL (ref 0.6–1.3)
CREAT UR-MCNC: 46.6 MG/DL
GFR SERPL CREATININE-BSD FRML MDRD: 26 ML/MIN/1.73SQ M
GLUCOSE P FAST SERPL-MCNC: 117 MG/DL (ref 65–99)
GLUCOSE UR STRIP-MCNC: NEGATIVE MG/DL
HGB UR QL STRIP.AUTO: NEGATIVE
KETONES UR STRIP-MCNC: NEGATIVE MG/DL
LEUKOCYTE ESTERASE UR QL STRIP: ABNORMAL
MAGNESIUM SERPL-MCNC: 2.3 MG/DL (ref 1.6–2.6)
NITRITE UR QL STRIP: NEGATIVE
NON-SQ EPI CELLS URNS QL MICRO: ABNORMAL /HPF
PH UR STRIP.AUTO: 6.5 [PH] (ref 4.5–8)
PHOSPHATE SERPL-MCNC: 5.4 MG/DL (ref 2.7–4.5)
POTASSIUM SERPL-SCNC: 4.1 MMOL/L (ref 3.5–5.3)
PROT SERPL-MCNC: 7.4 G/DL (ref 6.4–8.2)
PROT UR STRIP-MCNC: NEGATIVE MG/DL
PROT UR-MCNC: 29 MG/DL
PROT/CREAT UR: 0.62 MG/G{CREAT} (ref 0–0.1)
PTH-INTACT SERPL-MCNC: 135 PG/ML (ref 18.4–80.1)
RBC #/AREA URNS AUTO: ABNORMAL /HPF
SODIUM SERPL-SCNC: 146 MMOL/L (ref 136–145)
SP GR UR STRIP.AUTO: 1.01 (ref 1–1.03)
UROBILINOGEN UR QL STRIP.AUTO: 0.2 E.U./DL
WBC #/AREA URNS AUTO: ABNORMAL /HPF

## 2018-11-12 PROCEDURE — 81001 URINALYSIS AUTO W/SCOPE: CPT | Performed by: INTERNAL MEDICINE

## 2018-11-12 PROCEDURE — 83735 ASSAY OF MAGNESIUM: CPT | Performed by: INTERNAL MEDICINE

## 2018-11-12 PROCEDURE — 83970 ASSAY OF PARATHORMONE: CPT | Performed by: INTERNAL MEDICINE

## 2018-11-12 PROCEDURE — 36415 COLL VENOUS BLD VENIPUNCTURE: CPT | Performed by: INTERNAL MEDICINE

## 2018-11-12 PROCEDURE — 84100 ASSAY OF PHOSPHORUS: CPT | Performed by: INTERNAL MEDICINE

## 2018-11-12 PROCEDURE — 82570 ASSAY OF URINE CREATININE: CPT | Performed by: INTERNAL MEDICINE

## 2018-11-12 PROCEDURE — 84156 ASSAY OF PROTEIN URINE: CPT | Performed by: INTERNAL MEDICINE

## 2018-11-12 PROCEDURE — 80053 COMPREHEN METABOLIC PANEL: CPT | Performed by: INTERNAL MEDICINE

## 2018-11-13 ENCOUNTER — OFFICE VISIT (OUTPATIENT)
Dept: NEPHROLOGY | Facility: HOSPITAL | Age: 58
End: 2018-11-13
Payer: MEDICARE

## 2018-11-13 ENCOUNTER — TELEPHONE (OUTPATIENT)
Dept: NEPHROLOGY | Facility: HOSPITAL | Age: 58
End: 2018-11-13

## 2018-11-13 VITALS
HEART RATE: 104 BPM | DIASTOLIC BLOOD PRESSURE: 80 MMHG | SYSTOLIC BLOOD PRESSURE: 130 MMHG | WEIGHT: 171.2 LBS | BODY MASS INDEX: 26.87 KG/M2 | HEIGHT: 67 IN

## 2018-11-13 DIAGNOSIS — E55.9 VITAMIN D DEFICIENCY: ICD-10-CM

## 2018-11-13 DIAGNOSIS — F31.81 BIPOLAR 2 DISORDER (HCC): Chronic | ICD-10-CM

## 2018-11-13 DIAGNOSIS — N18.30 CHRONIC KIDNEY DISEASE, STAGE 3 (HCC): ICD-10-CM

## 2018-11-13 DIAGNOSIS — R79.89 ELEVATED LFTS: ICD-10-CM

## 2018-11-13 DIAGNOSIS — N28.1 CYST OF RIGHT KIDNEY: Chronic | ICD-10-CM

## 2018-11-13 DIAGNOSIS — E87.0 HYPERNATREMIA: ICD-10-CM

## 2018-11-13 DIAGNOSIS — N25.81 SECONDARY RENAL HYPERPARATHYROIDISM (HCC): Primary | ICD-10-CM

## 2018-11-13 PROCEDURE — 99214 OFFICE O/P EST MOD 30 MIN: CPT | Performed by: INTERNAL MEDICINE

## 2018-11-13 RX ORDER — AMILORIDE HYDROCHLORIDE 5 MG/1
5 TABLET ORAL DAILY
Qty: 30 TABLET | Refills: 3 | Status: SHIPPED | OUTPATIENT
Start: 2018-11-13 | End: 2019-01-29 | Stop reason: SDUPTHER

## 2018-11-13 RX ORDER — ATORVASTATIN CALCIUM 40 MG/1
20 TABLET, FILM COATED ORAL DAILY
Qty: 30 TABLET | Refills: 3 | Status: SHIPPED | OUTPATIENT
Start: 2018-11-13 | End: 2019-10-02 | Stop reason: SDUPTHER

## 2018-11-13 NOTE — TELEPHONE ENCOUNTER
----- Message from Bhavani Solis DO sent at 11/12/2018  1:05 PM EST -----  Labs reviewed and will discuss with her tomorrow at her follow up appointment

## 2018-11-13 NOTE — PATIENT INSTRUCTIONS
Chronic Kidney Disease   WHAT YOU NEED TO KNOW:   Chronic kidney disease (CKD) is the gradual and permanent loss of kidney function  It is also called chronic kidney failure, or chronic renal insufficiency  Normally, the kidneys remove fluid, chemicals, and waste from your blood  These wastes are turned into urine by your kidneys  CKD may worsen over time and lead to kidney failure  DISCHARGE INSTRUCTIONS:   Return to the emergency department if:   · You are confused and very drowsy  · You have a seizure  · You have shortness of breath  Contact your healthcare provider if:   · You suddenly gain or lose more weight than your healthcare provider has told you is okay  · You have itchy skin or a rash  · You urinate more or less than you normally do  · You have blood in your urine  · You have nausea and repeated vomiting  · You have fatigue or muscle weakness  · You have hiccups that will not stop  · You have questions or concerns about your condition or care  Medicines:   · Medicines  may be given to decrease blood pressure and get rid of extra fluid  You may also receive medicine to manage health conditions that may occur with CKD, such as anemia, diabetes, and heart disease  · Take your medicine as directed  Contact your healthcare provider if you think your medicine is not helping or if you have side effects  Tell him or her if you are allergic to any medicine  Keep a list of the medicines, vitamins, and herbs you take  Include the amounts, and when and why you take them  Bring the list or the pill bottles to follow-up visits  Carry your medicine list with you in case of an emergency  Follow up with your healthcare provider as directed: You will need to return for tests to monitor your kidney function  You may also be referred to a kidney specialist  Write down your questions so you remember to ask them during your visits  Manage other health conditions:   Follow your healthcare provider's directions on how to manage diabetes, high blood pressure, and heart disease  These conditions can make CKD worse  Talk to your healthcare provider before you take over-the-counter medicine  Medicines such as NSAIDs, stomach medicine, or laxatives may harm your kidneys  Weigh yourself daily:  Ask your healthcare provider what your weight should be  Ask how much liquid you should drink each day  CKD may cause you to gain or lose weight rapidly  Weigh yourself every day  Write down your weight, how much liquid you drink or eat, and how much you urinate each day  Contact your healthcare provider if your weight is higher or lower than it should be  Manage CKD:   · Maintain a healthy weight  Ask your healthcare provider how much you should weigh  Ask him to help you create a weight loss plan if you are overweight  · Exercise 30 to 60 minutes a day, 4 to 7 times a week, or as directed  Ask about the best exercise plan for you  Regular exercise can help you manage CKD, high blood pressure, and diabetes  · Follow your healthcare provider's advice about what to eat and drink  He may tell you to eat food low in sodium (salt), potassium, phosphorus, or protein  You may need to see a dietitian if you need help planning meals  Ask how much liquid to drink each day and which liquids are best for you  · Limit alcohol  Ask how much alcohol is safe for you to drink  A drink of alcohol is 12 ounces of beer, 5 ounces of wine, or 1½ ounces of liquor  · Do not smoke  Nicotine and other chemicals in cigarettes and cigars can cause lung and kidney damage  Ask your healthcare provider for information if you currently smoke and need help to quit  E-cigarettes or smokeless tobacco still contain nicotine  Talk to your healthcare provider before you use these products  · Ask your healthcare provider if you need vaccines    Infections such as pneumonia, influenza, and hepatitis can be more harmful or more likely to occur in a person who has CKD  Vaccines reduce your risk of infection with these viruses  © 2017 [facility] Information is for End User's use only and may not be sold, redistributed or otherwise used for commercial purposes  All illustrations and images included in CareNotes® are the copyrighted property of A D A M , Inc  or Everett Murphy  The above information is an  only  It is not intended as medical advice for individual conditions or treatments  Talk to your doctor, nurse or pharmacist before following any medical regimen to see if it is safe and effective for you

## 2018-11-13 NOTE — PROGRESS NOTES
OFFICE FOLLOW UP - Nephrology   Esteban Zepeda 66 y o  female MRN: [id number]    Encounter: [id number]        ASSESSMENT and PLAN:  Diagnoses and all orders for this visit:    61-year-old female with a past medical history of bipolar disorder type 2, stage 3 chronic kidney disease who presents for follow-up     Acute kidney injury onChronic kidney disease, stage 3  - creatinine continues to trend up and has been around 2  - she is currently not on any nephrotoxic agent,  Was on lithium for several years  -  Her acute kidney injury and worsening creatinine is likely related to a pre renal azotemia-transitioning to ATN- this is caused by her chronic lactulose abuse, she unfortunately  Was trying to control her weights by increasing lactulose use and was having greater than 5-6 bowel movements daily  She has subsequently now told me that she stop all laxatives accept MiraLax she also has rectal prolapse and is now going for surgery this week her creatinine has slowly improved now from 2 2 down to 2  - she does also have renal cysts will check a renal ultrasound as well  -she is off her potassium supplementation  -blood pressures have been well controlled  -she does have proteinuric kidney disease in this is been evaluated with serologic workup which has been negative  -she has had echogenic kidneys that have been small in size since at least 2016  -will schedule her for a CKD education course    [facility]  - she was on lithium in her 25s and may have some chronic interstitial nephritis associated with the she also has some mild hypernatremia  - she is currently being treated with Seroquel  -her psychiatrist recently retired as well as her therapist, during her office visit she feels like taking laxatives helps her control her weights we discussed in detail at this is not the correct way to control weights and advised her to not use laxatives this way    I do believe her mental health issues/bipolar disorder/weight complex are contributing to her physical ailments and she needs close pscyhiatric follow up and behavioral therapy  Placed a consult to [facility] Psychiatry    Cyst of right kidney  - she has had recent MRIs and CT scan will repeat a renal ultrasound in  2019 vto make sure that this is stable  -repeat renal ultrasound as above    Secondary renal hyperparathyroidism ([address] )  - she has a PTH around 150  -  continue vitamin D3 2000 units daily    Proteinuria  - she has a positive urine protein to creatinine ratio but a negative urinalysis she is  mildly anemic with CKD  She was on lithium several years ago she also has had acute kidney injury from chronic lactulose  Use  -  limited serologic workup was negative    hypokalemia  -Potassium is now stable off potassium supplementation    hypertension  - blood pressures are well controlled with amlodipine will transition to Amiloride because of hypernatremia  -discontinue amlodipine    Hypernatremia  -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 in the past her serum sodiums did go to 153  -will discontinue amlodipine and start Amiloride 5 mg daily  -Repeat BMP in 2 weeks  -will check a serum osmolality, urine osmolality  -may need to do a 24 hour urine volume to evaluate for polyuria    Elevated LFTs  -slight elevation in her LFTs lipid panel from February was okay  -will decrease atorvastatin to 20 mg  -repeat CMP in 2 weeks     Repeat blood work in 2 weeks and if stable follow-up in 3 months    HPI: Bj Navarro is a 66 y o  female who is here for No chief complaint on file      Since her last office visit she had a rectal prolapse surgery she feels okay since that time did have some hypernatremia her creatinine did increase slightly to 2 point she states she remains on MiraLax twice daily at the direction of her colon surgery has stopped lactulose she was concerned about recent weight gain and was controlling her weights with laxatives in the past    She does have proteinuria does notice foamy urine did have a serologic workup which was negative  Has not been on any lithium denies any NSAID use denies any chest pain or shortness of Breath fevers or chills nausea vomiting diarrhea or constipation    Is following up with Psychiatry but her psychiatrist is retiring as well as her therapist    ROS:   All the systems were reviewed and were negative except as documented on the HPI  Allergies: Patient has no known allergies      Medications:   Current Outpatient Prescriptions:     acetaminophen (TYLENOL) 325 mg tablet, Take 650 mg by mouth every 6 (six) hours as needed for mild pain, Disp: , Rfl:     atorvastatin (LIPITOR) 40 mg tablet, Take 0 5 tablets (20 mg total) by mouth daily, Disp: 30 tablet, Rfl: 3    clonazePAM (KLONOPIN) 0 5 mg tablet, Take by mouth 3 (three) times a day  , Disp: , Rfl:     fluvoxaMINE (LUVOX) 100 mg tablet, Take 100 mg by mouth 3 (three) times a day  , Disp: , Rfl:     lamoTRIgine (LaMICtal) 100 mg tablet, Take 100 mg by mouth 3 (three) times a day Takes at hs, Disp: , Rfl:     omeprazole (PriLOSEC) 40 MG capsule, Take 1 capsule (40 mg total) by mouth daily at bedtime, Disp: 90 capsule, Rfl: 3    polyethylene glycol (MIRALAX) 17 g packet, Take 17 g by mouth daily, Disp: , Rfl:     Psyllium (METAMUCIL FIBER) 51 7 % PACK, Take by mouth 3 (three) times a day, Disp: , Rfl:     QUEtiapine (SEROQUEL) 300 mg tablet, 1 in the AM     ( also takes 400 mg at bedtime) , Disp: , Rfl:     QUEtiapine (SEROquel) 400 MG tablet, 1 at bedtime, Disp: , Rfl:     AMILoride 5 mg tablet, Take 1 tablet (5 mg total) by mouth daily, Disp: 30 tablet, Rfl: 3    Past Medical History:   Diagnosis Date    Ankle sprain     left    Anxiety disorder     Bipolar 2 disorder (HCC)     Chronic back pain     CKD (chronic kidney disease) stage 3, GFR 30-59 ml/min (HCC)     Depression     Hypercholesteremia  Hyperlipidemia     Hypernatremia     Hypokalemia     Intervertebral disc disorder with radiculopathy of lumbosacral region     resolved: 2015    Panic attacks     Radiculitis     resolved: 2015    Secondary renal hyperparathyroidism ([facility] )     Vitamin D deficiency      Past Surgical History:   Procedure Laterality Date    DILATION AND CURETTAGE OF UTERUS      INDUCED       surgically induced    WI ERCP DX COLLECTION SPECIMEN BRUSHING/WASHING N/A 2018    Procedure: ENDOSCOPIC RETROGRADE CHOLANGIOPANCREATOGRAPHY (ERCP); Surgeon: Shannon Wang MD;  [facility];  Service: Gastroenterology    WI LAP, SURG PROCTOPEXY N/A 2018    Procedure: ROBOTIC SIGMOID RESECTION / RECTOPEXY;  Surgeon: Beena Lam MD;  [facility];  Service: Colorectal    WI SIGMOIDOSCOPY FLX DX W/COLLJ SPEC BR/WA IF PFRMD N/A 2018    Procedure: Taylor Bon;  Surgeon: Beena Lam MD;  [facility];  Service: Colorectal    TUBAL LIGATION Bilateral 1997     Family History   Problem Relation Age of Onset    Bipolar disorder Mother     Mental illness Mother         depression    Heart disease Father     Hypertension Father     Other Family         Back disorder    Diabetes Family     Heart disease Family     Hypertension Family     Breast cancer Family     Stroke Family     Thyroid disease Family     Substance Abuse Neg Hx         neg fam hx      reports that she has never smoked  She has never used smokeless tobacco  She reports that she does not drink alcohol or use drugs        Physical Exam:   Vitals:    18 1159 18 1204 18 1206 18 1208   BP:  128/77 129/77 130/80   BP Location:  Right arm Right arm Right arm   Patient Position:  Sitting Sitting Sitting   Cuff Size:  Standard Standard Standard   Pulse:  103 101 104   Weight: 77 7 kg (171 lb 3 2 oz)      Height: 5' 7" (1 702 m)        Body mass index is 26 81 kg/m²  General: conscious, cooperative, in not acute distress  Eyes: conjunctivae pink, anicteric sclerae  ENT: lips and mucous membranes moist  Neck: supple, no JVD  Chest: clear breath sounds bilateral, no crackles, ronchus or wheezings  CVS: distinct S1 & S2, normal rate, regular rhythm  Abdomen: soft, non-tender, non-distended, normoactive bowel sounds  Extremities: no edema of both legs  Skin: no rash  Neuro: awake, alert, oriented      Lab Results:    Results from last 7 days  Lab Units 11/12/18  0828   POTASSIUM mmol/L 4 1   CHLORIDE mmol/L 109*   CO2 mmol/L 28   BUN mg/dL 28*   CREATININE mg/dL 2 06*   CALCIUM mg/dL 9 2   MAGNESIUM mg/dL 2 3   PHOSPHORUS mg/dL 5 4*       Portions of the record may have been created with voice recognition software  Occasional wrong word or "sound a like" substitutions may have occurred due to the inherent limitations of voice recognition software  Read the chart carefully and recognize, using context, where substitutions have occurred  If you have any questions, please contact the dictating provider

## 2018-11-13 NOTE — LETTER
November 13, 2018     [facility] [place], [address]  [address] [address]    Patient: Lee Wang   YOB: 1960   Date of Visit: 11/13/2018       Dear Dr Farhan Mensah: Thank you for referring Hernandez Baker to me for evaluation  Below are my notes for this consultation  If you have questions, please do not hesitate to call me  I look forward to following your patient along with you           Sincerely,        Krissy Fu, DO        CC: No Recipients

## 2018-12-18 ENCOUNTER — HOSPITAL ENCOUNTER (OUTPATIENT)
Dept: ULTRASOUND IMAGING | Facility: HOSPITAL | Age: 58
Discharge: HOME | End: 2018-12-18
Attending: INTERNAL MEDICINE
Payer: MEDICARE

## 2018-12-18 DIAGNOSIS — N28.1 CYST OF RIGHT KIDNEY: Chronic | ICD-10-CM

## 2018-12-18 DIAGNOSIS — N18.30 CHRONIC KIDNEY DISEASE, STAGE 3 (HCC): ICD-10-CM

## 2018-12-18 PROCEDURE — 76770 US EXAM ABDO BACK WALL COMP: CPT

## 2018-12-26 ENCOUNTER — TELEPHONE (OUTPATIENT)
Dept: NEPHROLOGY | Facility: CLINIC | Age: 58
End: 2018-12-26

## 2018-12-26 NOTE — TELEPHONE ENCOUNTER
Pt has been informed of stable renal US with no changes  ----- Message from Siri Bowers DO sent at 12/26/2018 11:28 AM EST -----  Please let her know that her renal US was reviewed and cyst appears stable in size  Thanks

## 2019-01-02 ENCOUNTER — OFFICE VISIT (OUTPATIENT)
Dept: FAMILY MEDICINE CLINIC | Facility: HOSPITAL | Age: 59
End: 2019-01-02
Payer: MEDICARE

## 2019-01-02 VITALS
DIASTOLIC BLOOD PRESSURE: 80 MMHG | BODY MASS INDEX: 28.61 KG/M2 | HEIGHT: 66 IN | WEIGHT: 178 LBS | SYSTOLIC BLOOD PRESSURE: 132 MMHG | HEART RATE: 101 BPM

## 2019-01-02 DIAGNOSIS — N25.81 SECONDARY RENAL HYPERPARATHYROIDISM (HCC): ICD-10-CM

## 2019-01-02 DIAGNOSIS — E22.1 HYPERPROLACTINEMIA (HCC): ICD-10-CM

## 2019-01-02 DIAGNOSIS — R63.5 WEIGHT GAIN: ICD-10-CM

## 2019-01-02 DIAGNOSIS — N32.89 BLADDER DISTENSION: ICD-10-CM

## 2019-01-02 DIAGNOSIS — M81.0 AGE-RELATED OSTEOPOROSIS WITHOUT CURRENT PATHOLOGICAL FRACTURE: Chronic | ICD-10-CM

## 2019-01-02 DIAGNOSIS — R01.1 CARDIAC MURMUR: ICD-10-CM

## 2019-01-02 DIAGNOSIS — I10 ESSENTIAL HYPERTENSION: Primary | ICD-10-CM

## 2019-01-02 DIAGNOSIS — F31.81 BIPOLAR 2 DISORDER (HCC): Chronic | ICD-10-CM

## 2019-01-02 DIAGNOSIS — N18.30 CHRONIC KIDNEY DISEASE, STAGE 3 (HCC): ICD-10-CM

## 2019-01-02 DIAGNOSIS — Z23 NEED FOR INFLUENZA VACCINATION: ICD-10-CM

## 2019-01-02 DIAGNOSIS — N28.1 CYST OF RIGHT KIDNEY: Chronic | ICD-10-CM

## 2019-01-02 DIAGNOSIS — Z12.39 SCREENING FOR BREAST CANCER: ICD-10-CM

## 2019-01-02 DIAGNOSIS — K86.1 IDIOPATHIC CHRONIC PANCREATITIS (HCC): ICD-10-CM

## 2019-01-02 PROBLEM — E87.0 HYPERNATREMIA: Status: RESOLVED | Noted: 2018-11-13 | Resolved: 2019-01-02

## 2019-01-02 PROCEDURE — 90682 RIV4 VACC RECOMBINANT DNA IM: CPT

## 2019-01-02 PROCEDURE — 99214 OFFICE O/P EST MOD 30 MIN: CPT | Performed by: INTERNAL MEDICINE

## 2019-01-02 PROCEDURE — 90471 IMMUNIZATION ADMIN: CPT

## 2019-01-02 RX ORDER — QUETIAPINE FUMARATE 400 MG/1
400 TABLET, FILM COATED ORAL
Qty: 90 TABLET | Refills: 3 | Status: SHIPPED | OUTPATIENT
Start: 2019-01-02 | End: 2019-02-11

## 2019-01-02 RX ORDER — QUETIAPINE FUMARATE 300 MG/1
300 TABLET, FILM COATED ORAL EVERY MORNING
Qty: 90 TABLET | Refills: 3 | Status: SHIPPED | OUTPATIENT
Start: 2019-01-02 | End: 2021-09-14

## 2019-01-02 RX ORDER — CLONAZEPAM 0.5 MG/1
0.5 TABLET ORAL 3 TIMES DAILY
Qty: 90 TABLET | Refills: 0 | Status: CANCELLED | OUTPATIENT
Start: 2019-01-02

## 2019-01-02 RX ORDER — CLONAZEPAM 0.5 MG/1
0.5 TABLET ORAL 3 TIMES DAILY
Qty: 270 TABLET | Refills: 0 | Status: SHIPPED | OUTPATIENT
Start: 2019-01-02 | End: 2019-01-07 | Stop reason: SDUPTHER

## 2019-01-02 NOTE — ASSESSMENT & PLAN NOTE
Had left ankle fracture age 50   clavicle fracture as child- skateboard   had dexascan dec 2017  Repeat in Dec 2019

## 2019-01-02 NOTE — ASSESSMENT & PLAN NOTE
Usual weight was 126-gained 15 lbs since September  was abusing laxatives at that time   Skips meals and eats one full meal daily- eats cereal in middle of night s topped the ice cream and cookies she was having in past

## 2019-01-02 NOTE — PATIENT INSTRUCTIONS
Have labs done fasting with Dr Darinel Linares labs   see in march to April with medicare wellness exam to be done   I have reoredered her 3 month supply of klonipin and seroquel

## 2019-01-02 NOTE — ASSESSMENT & PLAN NOTE
Echo done 6/2018- mild pulmonary valve regurg and normal lv ej fraction- grade 1 diastolic dysfunction noted

## 2019-01-02 NOTE — ASSESSMENT & PLAN NOTE
Dr Ralph Goods her psychiatrist retired- now looking to see [employer]  I will fill rx for  [employer]   Has rx for seroquel

## 2019-01-02 NOTE — ASSESSMENT & PLAN NOTE
Was dx in approx in her 31's with amenorrhea- had pitiutary ? Cyst- last mri over 10 years ago in [facility]  Will check levels and order mri of brain to assess   She is having some headache at times currently

## 2019-01-02 NOTE — PROGRESS NOTES
Assessment/Plan:             Problem List Items Addressed This Visit        Digestive    RESOLVED: Idiopathic chronic pancreatitis ([facility] )       Endocrine    Hyperprolactinemia ([facility] )     Was dx in approx in her 31's with amenorrhea- had pitiutary ? Cyst- last mri over 10 years ago in [facility]  Will check levels and order mri of brain to assess  She is having some headache at times currently         Relevant Orders    Prolactin    Secondary renal hyperparathyroidism [facility])       Cardiovascular and Mediastinum    Essential hypertension - Primary    Relevant Orders    Lipid Panel with Direct LDL reflex       Musculoskeletal and Integument    Age related osteoporosis (Chronic)     Had left ankle fracture age 50   clavicle fracture as child- skateboard   had dexascan dec 2017  Repeat in Dec 2019            Genitourinary    Cyst of right kidney (Chronic)    Chronic kidney disease, stage 3 ([facility] )     Had prior proteinuria- has appt in feb with Dr Mills         Relevant Orders    Lipid Panel with Direct LDL reflex       Other    Bipolar 2 disorder (HCC) (Chronic)    Cardiac murmur     Echo done 6/2018- mild pulmonary valve regurg and normal lv ej fraction- grade 1 diastolic dysfunction noted         Weight gain     Usual weight was 126-gained 15 lbs since September  was abusing laxatives at that time   Skips meals and eats one full meal daily- eats cereal in middle of night s topped the ice cream and cookies she was having in past         Screening for breast cancer    Relevant Orders    Mammo screening bilateral w 3d & cad      Other Visit Diagnoses     Need for influenza vaccination        Relevant Orders    PREFERRED: influenza vaccine, [id number], quadrivalent, recombinant, PF, 0 5 mL, for patients 18 yr+ (FLUBLOK) (Completed)    Bladder distension        Relevant Orders    Ambulatory referral to Urology            Subjective:      Patient ID: Griffin Chase is a 66 y o  female    See porsamira notes- seeing nephrology for ckd stage 3   some headaches recently- also had some left neck pain p briefly   rectal prolapse surgery in July- had prior laxative abuse issues-no current laxative use- using fiber and metamucil        The following portions of the patient's history were reviewed and updated as appropriate: allergies, current medications and problem list      Review of Systems   Constitutional: Positive for unexpected weight change  HENT: Negative for congestion and postnasal drip  All other systems reviewed and are negative  Objective:      Current Outpatient Prescriptions:     acetaminophen (TYLENOL) 325 mg tablet, Take 650 mg by mouth every 6 (six) hours as needed for mild pain, Disp: , Rfl:     AMILoride 5 mg tablet, Take 1 tablet (5 mg total) by mouth daily, Disp: 30 tablet, Rfl: 3    atorvastatin (LIPITOR) 40 mg tablet, Take 0 5 tablets (20 mg total) by mouth daily, Disp: 30 tablet, Rfl: 3    clonazePAM (KLONOPIN) 0 5 mg tablet, Take by mouth 3 (three) times a day  , Disp: , Rfl:     fluvoxaMINE (LUVOX) 100 mg tablet, Take 100 mg by mouth 3 (three) times a day  , Disp: , Rfl:     lamoTRIgine (LaMICtal) 100 mg tablet, Take 100 mg by mouth 3 (three) times a day Takes at hs, Disp: , Rfl:     omeprazole (PriLOSEC) 40 MG capsule, Take 1 capsule (40 mg total) by mouth daily at bedtime, Disp: 90 capsule, Rfl: 3    Polyethylene Glycol 3350 (MIRALAX PO), every 24 hours, Disp: , Rfl:     Psyllium (METAMUCIL FIBER) 51 7 % PACK, Take by mouth 3 (three) times a day, Disp: , Rfl:     QUEtiapine (SEROQUEL) 300 mg tablet, 1 in the AM     ( also takes 400 mg at bedtime) , Disp: , Rfl:     QUEtiapine (SEROquel) 400 MG tablet, 1 at bedtime, Disp: , Rfl:     Blood pressure 132/80, pulse 101, height 5' 6" (1 676 m), weight 80 7 kg (178 lb), not currently breastfeeding  Physical Exam   Constitutional: She is oriented to person, place, and time  She appears well-developed and well-nourished  No distress  HENT:   Head: Normocephalic  Right Ear: External ear normal    Left Ear: External ear normal    Eyes: Pupils are equal, round, and reactive to light  EOM are normal  Left eye exhibits no discharge  Neck: Normal range of motion  Neck supple  No JVD present  Cardiovascular: Normal rate and regular rhythm  Exam reveals no friction rub  No murmur heard  Pulmonary/Chest: Effort normal and breath sounds normal  No respiratory distress  She has no wheezes  Abdominal: Soft  Bowel sounds are normal  She exhibits no distension  There is no tenderness  Suprapubic incision without erythema  Or drainage- had hemicolectomy   Musculoskeletal: She exhibits no edema, tenderness or deformity  Neurological: She is alert and oriented to person, place, and time  She displays normal reflexes  Coordination normal    Skin: Skin is warm and dry  No erythema  No pallor  Nursing note and vitals reviewed

## 2019-01-04 DIAGNOSIS — F31.81 BIPOLAR 2 DISORDER (HCC): Chronic | ICD-10-CM

## 2019-01-04 RX ORDER — CLONAZEPAM 0.5 MG/1
0.5 TABLET ORAL 3 TIMES DAILY
Qty: 270 TABLET | Refills: 0 | Status: CANCELLED | OUTPATIENT
Start: 2019-01-04

## 2019-01-04 NOTE — TELEPHONE ENCOUNTER
Rani Alicia 1960  CONFIDENTIALTY NOTICE: This fax transmission is intended only for the addressee  It contains information that is legally privileged,  confidential or otherwise protected from use or disclosure  If you are not the intended recipient, you are strictly prohibited from reviewing,  disclosing, copying using or disseminating any of this information or taking any action in reliance on or regarding this information  If you have  received this fax in error, please notify us immediately by telephone so that we can arrange for its return to us  Page:   Call Id: [id number]  Health Call  Standard Call Report  Health Call  Patient Name: Rani Alicia  Gender: Female  : 1960  Age: 66 Y 10 M 6 D  Return Phone  Number: [phone] (Home)  [facility]  City/State/[facility]: [place] [address]  Practice Name: [facility]  Practice Charged:  Physician:  Zander Thrasher Name:  Relationship To  Patient: Self  Return Phone Number: [phone] (Home)  Presenting Problem: "My Klonopin did not come in the  mail and I need emergency script  called in until Tuesday "  Service Type: Prescription Refills  Charged Service 1: N/A  Pharmacy Name and  Number:  Nurse Assessment  Protocols  Protocol Title Nurse Date/Time  Disp  Time Disposition Final User  2019 5:06:42 PM Send to [address]  2019 5:07:33 PM Send to [address]  2019 5:38:21 PM Close Yes FRANCI Tavares, [facility]  User: Valencia Johansen RN Date/Time: 2019 5:38:14 PM  Patient was called and advised that controlled medication prescriptions are not refilled after hours  She was advised to call the  office on Monday or go to an Urgent Care for a refill

## 2019-01-07 DIAGNOSIS — F31.81 BIPOLAR 2 DISORDER (HCC): Chronic | ICD-10-CM

## 2019-01-07 RX ORDER — CLONAZEPAM 0.5 MG/1
0.5 TABLET ORAL 3 TIMES DAILY
Qty: 21 TABLET | Refills: 0 | Status: SHIPPED | OUTPATIENT
Start: 2019-01-07 | End: 2019-04-02 | Stop reason: SDUPTHER

## 2019-01-14 ENCOUNTER — TELEPHONE (OUTPATIENT)
Dept: NEPHROLOGY | Facility: CLINIC | Age: 59
End: 2019-01-14

## 2019-01-14 ENCOUNTER — TELEPHONE (OUTPATIENT)
Dept: NEPHROLOGY | Facility: HOSPITAL | Age: 59
End: 2019-01-14

## 2019-01-14 DIAGNOSIS — N18.30 CKD (CHRONIC KIDNEY DISEASE) STAGE 3, GFR 30-59 ML/MIN (HCC): ICD-10-CM

## 2019-01-14 DIAGNOSIS — I10 ESSENTIAL HYPERTENSION: Primary | ICD-10-CM

## 2019-01-14 NOTE — TELEPHONE ENCOUNTER
----- Message from [address] sent at 1/14/2019  2:57 PM EST -----  Hi! Have her check her BP twice a for 5 days  If BP still elevated > 150/95 consistently, have her call office  Please get BMP now to evaluate electrolytes  Also make sure is on a low sodium diet    Thanks  Judy Rosario

## 2019-01-14 NOTE — TELEPHONE ENCOUNTER
Patient called this afternoon with a couple BP readings she said they have been running high she is unsure as to what to do       1/10 168/98  1/11 168/101  1/14  168/100

## 2019-01-14 NOTE — TELEPHONE ENCOUNTER
I spoke with the patient, she is aware she needs to log her BP readings for the next 5 days, twice a day  Also, she will get a BMP done tomorrow  I placed the order in the chart  She is not currently on a low sodium diet, but I explained it and she will start cutting back on her sodium

## 2019-01-14 NOTE — TELEPHONE ENCOUNTER
Pt called and said she's been experiencing a headache for a while now and decided to check her BP Thursday, 158/98 and Friday 168/101  She hasn't check it today yet but states there is no headache as of now   Wants to know if she should switch back to her old bp med which was amlodipine 5mg, she's currently taking amiloride 5 mg

## 2019-01-15 ENCOUNTER — TELEPHONE (OUTPATIENT)
Dept: NEPHROLOGY | Facility: CLINIC | Age: 59
End: 2019-01-15

## 2019-01-15 DIAGNOSIS — N18.30 CKD (CHRONIC KIDNEY DISEASE) STAGE 3, GFR 30-59 ML/MIN (HCC): Primary | ICD-10-CM

## 2019-01-15 NOTE — TELEPHONE ENCOUNTER
Filemon Shaver called back  She's aware of the plan and will continue to monitor her BP  She understands to call us if her BP is consistently > 150/95  She stated that she does not need an Rx for Amlodipine 5mg due to having leftover pills from previously being on it  She is planning on going to [facility] lab on Monday for the blood work  No other concerns at the moment

## 2019-01-15 NOTE — TELEPHONE ENCOUNTER
----- Message from Darell Rachel DO sent at 1/15/2019 11:30 AM EST -----  Thanks Orin Bosworth for checking this  I agree with below and as written below she can start taking the amlodipine 5 mg daily in addition to the amiloride to see if BP improves  Can you also order repeat blood work, cbc, cmp, mag, phos  Pth, thanks  ----- Message -----  From: EMERITA Huynh  Sent: 1/14/2019   2:57 PM  To: Darell Rachel DO, #    Hi! Have her check her BP twice a for 5 days  If BP still elevated > 150/95 consistently, have her call office  Please get BMP now to evaluate electrolytes  Also make sure is on a low sodium diet    Thanks  Orin Bosworth

## 2019-01-15 NOTE — TELEPHONE ENCOUNTER
She can start taking the amlodipine 5 mg daily with the amiloride  Thanks  Have her repeat a BMP  I wasn't to make sure her creatinine is stable  Thanks

## 2019-01-16 ENCOUNTER — LAB (OUTPATIENT)
Dept: LAB | Facility: HOSPITAL | Age: 59
End: 2019-01-16
Attending: INTERNAL MEDICINE
Payer: MEDICARE

## 2019-01-16 DIAGNOSIS — F31.81 BIPOLAR 2 DISORDER (HCC): Chronic | ICD-10-CM

## 2019-01-16 DIAGNOSIS — N18.30 CHRONIC KIDNEY DISEASE, STAGE 3 (HCC): ICD-10-CM

## 2019-01-16 DIAGNOSIS — I10 ESSENTIAL HYPERTENSION: ICD-10-CM

## 2019-01-16 DIAGNOSIS — N18.30 CKD (CHRONIC KIDNEY DISEASE) STAGE 3, GFR 30-59 ML/MIN (HCC): ICD-10-CM

## 2019-01-16 DIAGNOSIS — R63.5 WEIGHT GAIN: ICD-10-CM

## 2019-01-16 LAB
ALBUMIN SERPL BCP-MCNC: 3.9 G/DL (ref 3.5–5)
ALP SERPL-CCNC: 195 U/L (ref 46–116)
ALT SERPL W P-5'-P-CCNC: 37 U/L (ref 12–78)
ANION GAP SERPL CALCULATED.3IONS-SCNC: 10 MMOL/L (ref 4–13)
AST SERPL W P-5'-P-CCNC: 29 U/L (ref 5–45)
BASOPHILS # BLD AUTO: 0.07 THOUSANDS/ΜL (ref 0–0.1)
BASOPHILS NFR BLD AUTO: 1 % (ref 0–1)
BILIRUB SERPL-MCNC: 0.2 MG/DL (ref 0.2–1)
BUN SERPL-MCNC: 34 MG/DL (ref 5–25)
CALCIUM SERPL-MCNC: 9.4 MG/DL (ref 8.3–10.1)
CHLORIDE SERPL-SCNC: 110 MMOL/L (ref 100–108)
CHOLEST SERPL-MCNC: 222 MG/DL (ref 50–200)
CO2 SERPL-SCNC: 27 MMOL/L (ref 21–32)
CREAT SERPL-MCNC: 2 MG/DL (ref 0.6–1.3)
EOSINOPHIL # BLD AUTO: 0.23 THOUSAND/ΜL (ref 0–0.61)
EOSINOPHIL NFR BLD AUTO: 4 % (ref 0–6)
ERYTHROCYTE [DISTWIDTH] IN BLOOD BY AUTOMATED COUNT: 13.2 % (ref 11.6–15.1)
GFR SERPL CREATININE-BSD FRML MDRD: 27 ML/MIN/1.73SQ M
GLUCOSE P FAST SERPL-MCNC: 99 MG/DL (ref 65–99)
HCT VFR BLD AUTO: 39.2 % (ref 34.8–46.1)
HDLC SERPL-MCNC: 68 MG/DL (ref 40–60)
HGB BLD-MCNC: 12.4 G/DL (ref 11.5–15.4)
IMM GRANULOCYTES # BLD AUTO: 0.01 THOUSAND/UL (ref 0–0.2)
IMM GRANULOCYTES NFR BLD AUTO: 0 % (ref 0–2)
LDLC SERPL CALC-MCNC: 129 MG/DL (ref 0–100)
LYMPHOCYTES # BLD AUTO: 1.34 THOUSANDS/ΜL (ref 0.6–4.47)
LYMPHOCYTES NFR BLD AUTO: 25 % (ref 14–44)
MAGNESIUM SERPL-MCNC: 2.4 MG/DL (ref 1.6–2.6)
MCH RBC QN AUTO: 31.4 PG (ref 26.8–34.3)
MCHC RBC AUTO-ENTMCNC: 31.6 G/DL (ref 31.4–37.4)
MCV RBC AUTO: 99 FL (ref 82–98)
MONOCYTES # BLD AUTO: 0.43 THOUSAND/ΜL (ref 0.17–1.22)
MONOCYTES NFR BLD AUTO: 8 % (ref 4–12)
NEUTROPHILS # BLD AUTO: 3.28 THOUSANDS/ΜL (ref 1.85–7.62)
NEUTS SEG NFR BLD AUTO: 62 % (ref 43–75)
NRBC BLD AUTO-RTO: 0 /100 WBCS
OSMOLALITY UR/SERPL-RTO: 316 MMOL/KG (ref 282–298)
PHOSPHATE SERPL-MCNC: 4.1 MG/DL (ref 2.7–4.5)
PLATELET # BLD AUTO: 258 THOUSANDS/UL (ref 149–390)
PMV BLD AUTO: 10.8 FL (ref 8.9–12.7)
POTASSIUM SERPL-SCNC: 4.6 MMOL/L (ref 3.5–5.3)
PROT SERPL-MCNC: 7.6 G/DL (ref 6.4–8.2)
PTH-INTACT SERPL-MCNC: 118.1 PG/ML (ref 18.4–80.1)
RBC # BLD AUTO: 3.95 MILLION/UL (ref 3.81–5.12)
SODIUM SERPL-SCNC: 147 MMOL/L (ref 136–145)
TRIGL SERPL-MCNC: 125 MG/DL
TSH SERPL DL<=0.05 MIU/L-ACNC: 1.75 UIU/ML (ref 0.36–3.74)
WBC # BLD AUTO: 5.36 THOUSAND/UL (ref 4.31–10.16)

## 2019-01-16 PROCEDURE — 36415 COLL VENOUS BLD VENIPUNCTURE: CPT

## 2019-01-16 PROCEDURE — 80053 COMPREHEN METABOLIC PANEL: CPT

## 2019-01-16 PROCEDURE — 84443 ASSAY THYROID STIM HORMONE: CPT

## 2019-01-16 PROCEDURE — 83735 ASSAY OF MAGNESIUM: CPT

## 2019-01-16 PROCEDURE — 83970 ASSAY OF PARATHORMONE: CPT

## 2019-01-16 PROCEDURE — 83930 ASSAY OF BLOOD OSMOLALITY: CPT

## 2019-01-16 PROCEDURE — 84100 ASSAY OF PHOSPHORUS: CPT

## 2019-01-16 PROCEDURE — 85025 COMPLETE CBC W/AUTO DIFF WBC: CPT

## 2019-01-16 PROCEDURE — 80061 LIPID PANEL: CPT

## 2019-01-17 NOTE — PROGRESS NOTES
Labs reviewed and serum osm elevated at 316 and sodium increased now to 147, creatnine ok at 2 00  Would make sure she is definitely taking amiloride and can you f/u on her BP and we will follow up with repeat BMP before her appointment in 2 week  Thanks

## 2019-01-18 ENCOUNTER — TELEPHONE (OUTPATIENT)
Dept: NEPHROLOGY | Facility: CLINIC | Age: 59
End: 2019-01-18

## 2019-01-18 DIAGNOSIS — N18.30 CKD (CHRONIC KIDNEY DISEASE) STAGE 3, GFR 30-59 ML/MIN (HCC): Primary | ICD-10-CM

## 2019-01-18 NOTE — TELEPHONE ENCOUNTER
----- Message from Alexandr Calix DO sent at 1/17/2019  3:58 PM EST -----  Labs reviewed and serum osm elevated at 316 and sodium increased now to 147, creatnine ok at 2 00  Would make sure she is definitely taking amiloride and can you f/u on her BP and we will follow up with repeat BMP before her appointment in 2 week  DAHLIA andino

## 2019-01-18 NOTE — TELEPHONE ENCOUNTER
Richard Peters called back, I relayed her blood work results over to her  I asked her how her blood pressures have been and she gave me a few readings from the past 3 days:  1/15-  168/102   217/116  1/16- 178/105   173/97  1/17- 171/100   172/105  She stated that she has been taking her amiloride  She will get repeat BMP before her appt with Dr Rosangela Neri on 2/4  I will order this  She does not need it sent out because she does go to [facility] for blood work

## 2019-01-21 ENCOUNTER — DOCUMENTATION (OUTPATIENT)
Dept: NEPHROLOGY | Facility: CLINIC | Age: 59
End: 2019-01-21

## 2019-01-21 ENCOUNTER — TELEPHONE (OUTPATIENT)
Dept: FAMILY MEDICINE CLINIC | Facility: HOSPITAL | Age: 59
End: 2019-01-21

## 2019-01-21 RX ORDER — AMLODIPINE BESYLATE 5 MG/1
5 TABLET ORAL 2 TIMES DAILY
COMMUNITY
End: 2019-02-13 | Stop reason: SDUPTHER

## 2019-01-21 NOTE — TELEPHONE ENCOUNTER
Labs in chart  I see the prolactin level was dated to be done --2/1/19  I called pt and told her this and she will go then to get that particular lab done  Wants results on the labs      Please send answer back to clinical team   Jasvirx

## 2019-01-21 NOTE — TELEPHONE ENCOUNTER
Discussed with patient can you add amlodipine 5 mg BID to her medication list   She is also to bring her BP cuff to her next appointment  Thanks

## 2019-01-23 PROBLEM — F31.4 BIPOLAR 1 DISORDER, DEPRESSED, SEVERE (HCC): Status: ACTIVE | Noted: 2019-01-23

## 2019-01-23 PROBLEM — F40.01 PANIC DISORDER WITH AGORAPHOBIA: Status: ACTIVE | Noted: 2019-01-23

## 2019-01-23 PROBLEM — F31.81 BIPOLAR 2 DISORDER (HCC): Chronic | Status: RESOLVED | Noted: 2017-01-17 | Resolved: 2019-01-23

## 2019-01-23 PROBLEM — F42.9 OBSESSIVE COMPULSIVE DISORDER: Status: ACTIVE | Noted: 2019-01-23

## 2019-01-23 PROBLEM — F50.9 EATING DISORDER: Status: ACTIVE | Noted: 2019-01-23

## 2019-01-24 ENCOUNTER — TRANSCRIBE ORDERS (OUTPATIENT)
Dept: ADMINISTRATIVE | Facility: HOSPITAL | Age: 59
End: 2019-01-24

## 2019-01-24 ENCOUNTER — LAB (OUTPATIENT)
Dept: LAB | Facility: HOSPITAL | Age: 59
End: 2019-01-24
Attending: INTERNAL MEDICINE
Payer: MEDICARE

## 2019-01-24 DIAGNOSIS — N18.30 CKD (CHRONIC KIDNEY DISEASE) STAGE 3, GFR 30-59 ML/MIN (HCC): ICD-10-CM

## 2019-01-24 DIAGNOSIS — N18.30 CHRONIC KIDNEY DISEASE, STAGE III (MODERATE) (HCC): ICD-10-CM

## 2019-01-24 DIAGNOSIS — N18.30 CHRONIC KIDNEY DISEASE, STAGE III (MODERATE) (HCC): Primary | ICD-10-CM

## 2019-01-24 LAB
ALBUMIN SERPL BCP-MCNC: 4.2 G/DL (ref 3.5–5)
ALP SERPL-CCNC: 187 U/L (ref 46–116)
ALT SERPL W P-5'-P-CCNC: 41 U/L (ref 12–78)
ANION GAP SERPL CALCULATED.3IONS-SCNC: 8 MMOL/L (ref 4–13)
AST SERPL W P-5'-P-CCNC: 30 U/L (ref 5–45)
BASOPHILS # BLD AUTO: 0.06 THOUSANDS/ΜL (ref 0–0.1)
BASOPHILS NFR BLD AUTO: 1 % (ref 0–1)
BILIRUB SERPL-MCNC: 0.4 MG/DL (ref 0.2–1)
BUN SERPL-MCNC: 35 MG/DL (ref 5–25)
CALCIUM SERPL-MCNC: 10 MG/DL (ref 8.3–10.1)
CHLORIDE SERPL-SCNC: 106 MMOL/L (ref 100–108)
CO2 SERPL-SCNC: 28 MMOL/L (ref 21–32)
CREAT SERPL-MCNC: 2.17 MG/DL (ref 0.6–1.3)
EOSINOPHIL # BLD AUTO: 0.2 THOUSAND/ΜL (ref 0–0.61)
EOSINOPHIL NFR BLD AUTO: 4 % (ref 0–6)
ERYTHROCYTE [DISTWIDTH] IN BLOOD BY AUTOMATED COUNT: 12.9 % (ref 11.6–15.1)
GFR SERPL CREATININE-BSD FRML MDRD: 25 ML/MIN/1.73SQ M
GLUCOSE P FAST SERPL-MCNC: 115 MG/DL (ref 65–99)
HCT VFR BLD AUTO: 42.3 % (ref 34.8–46.1)
HGB BLD-MCNC: 13.5 G/DL (ref 11.5–15.4)
IMM GRANULOCYTES # BLD AUTO: 0.01 THOUSAND/UL (ref 0–0.2)
IMM GRANULOCYTES NFR BLD AUTO: 0 % (ref 0–2)
LYMPHOCYTES # BLD AUTO: 1.78 THOUSANDS/ΜL (ref 0.6–4.47)
LYMPHOCYTES NFR BLD AUTO: 34 % (ref 14–44)
MAGNESIUM SERPL-MCNC: 2.4 MG/DL (ref 1.6–2.6)
MCH RBC QN AUTO: 31.3 PG (ref 26.8–34.3)
MCHC RBC AUTO-ENTMCNC: 31.9 G/DL (ref 31.4–37.4)
MCV RBC AUTO: 98 FL (ref 82–98)
MONOCYTES # BLD AUTO: 0.51 THOUSAND/ΜL (ref 0.17–1.22)
MONOCYTES NFR BLD AUTO: 10 % (ref 4–12)
NEUTROPHILS # BLD AUTO: 2.69 THOUSANDS/ΜL (ref 1.85–7.62)
NEUTS SEG NFR BLD AUTO: 51 % (ref 43–75)
NRBC BLD AUTO-RTO: 0 /100 WBCS
PHOSPHATE SERPL-MCNC: 3.8 MG/DL (ref 2.7–4.5)
PLATELET # BLD AUTO: 255 THOUSANDS/UL (ref 149–390)
PMV BLD AUTO: 10.3 FL (ref 8.9–12.7)
POTASSIUM SERPL-SCNC: 4.3 MMOL/L (ref 3.5–5.3)
PROT SERPL-MCNC: 8 G/DL (ref 6.4–8.2)
PTH-INTACT SERPL-MCNC: 87.1 PG/ML (ref 18.4–80.1)
RBC # BLD AUTO: 4.31 MILLION/UL (ref 3.81–5.12)
SODIUM SERPL-SCNC: 142 MMOL/L (ref 136–145)
WBC # BLD AUTO: 5.25 THOUSAND/UL (ref 4.31–10.16)

## 2019-01-24 PROCEDURE — 84100 ASSAY OF PHOSPHORUS: CPT

## 2019-01-24 PROCEDURE — 85025 COMPLETE CBC W/AUTO DIFF WBC: CPT

## 2019-01-24 PROCEDURE — 36415 COLL VENOUS BLD VENIPUNCTURE: CPT

## 2019-01-24 PROCEDURE — 80053 COMPREHEN METABOLIC PANEL: CPT

## 2019-01-24 PROCEDURE — 83735 ASSAY OF MAGNESIUM: CPT

## 2019-01-24 PROCEDURE — 83970 ASSAY OF PARATHORMONE: CPT

## 2019-01-24 NOTE — TELEPHONE ENCOUNTER
I left message on patient's answering machine-these had been review by Dr Aylin Schuster also and he is repeating labs in 2 weeks before his follow-up appointment with her    Prolactin level is elevated which may be in part due to the chronic kidney disease issues-will discuss with patient via phone or at her follow-up appointment on 02/04

## 2019-01-25 ENCOUNTER — TELEPHONE (OUTPATIENT)
Dept: NEPHROLOGY | Facility: CLINIC | Age: 59
End: 2019-01-25

## 2019-01-25 NOTE — TELEPHONE ENCOUNTER
Have her increase the amiloride to 5 mg twice daily as well  Can you set her up to see Kwesi Melendrez Next week for BP check if she has availbility and have her bring her BP cuff with her  Thanks

## 2019-01-25 NOTE — TELEPHONE ENCOUNTER
----- Message from Manny Barclay DO sent at 1/25/2019  9:55 AM EST -----  Please let her know that creatinine is stable Can you see if BP has improved? Thanks

## 2019-01-25 NOTE — TELEPHONE ENCOUNTER
I called and spoke with Daisy Pendleton  She's aware of her stable creatinine level  She stated that her BP is higher in left arm compared to right arm - She stated that her BP yesterday was: 209/102 (left arm), 154/92 (right arm), then later on: 180/95(right arm)  She's been taking her BP on her right arm more often and noticed that her BP is elevated in the afternoon  I confirmed that she is still taking amlodipine 5mg BID, she verified that she was

## 2019-01-25 NOTE — TELEPHONE ENCOUNTER
I called and spoke with Eddie Haddad  She is aware to increase her Amiloride to 5mg BID  She has been scheduled to see Prince Ayoub on Monday, 1/28, for a BP check in [place]  She will also bring in her BP cuff  No other concerns at the moment

## 2019-01-28 ENCOUNTER — OFFICE VISIT (OUTPATIENT)
Dept: NEPHROLOGY | Facility: HOSPITAL | Age: 59
End: 2019-01-28
Payer: MEDICARE

## 2019-01-28 VITALS
HEIGHT: 66 IN | DIASTOLIC BLOOD PRESSURE: 92 MMHG | BODY MASS INDEX: 25.94 KG/M2 | HEART RATE: 96 BPM | RESPIRATION RATE: 16 BRPM | WEIGHT: 161.38 LBS | SYSTOLIC BLOOD PRESSURE: 150 MMHG

## 2019-01-28 DIAGNOSIS — N25.81 SECONDARY RENAL HYPERPARATHYROIDISM (HCC): ICD-10-CM

## 2019-01-28 DIAGNOSIS — K62.3 RECTAL PROLAPSE: ICD-10-CM

## 2019-01-28 DIAGNOSIS — E55.9 VITAMIN D DEFICIENCY: ICD-10-CM

## 2019-01-28 DIAGNOSIS — N28.1 RENAL CYST: ICD-10-CM

## 2019-01-28 DIAGNOSIS — N18.30 CHRONIC KIDNEY DISEASE, STAGE 3 (HCC): ICD-10-CM

## 2019-01-28 DIAGNOSIS — I10 ESSENTIAL HYPERTENSION: Primary | ICD-10-CM

## 2019-01-28 DIAGNOSIS — F31.4 BIPOLAR 1 DISORDER, DEPRESSED, SEVERE (HCC): ICD-10-CM

## 2019-01-28 DIAGNOSIS — F50.9 EATING DISORDER: ICD-10-CM

## 2019-01-28 DIAGNOSIS — E87.0 HYPERNATREMIA: ICD-10-CM

## 2019-01-28 PROCEDURE — 99214 OFFICE O/P EST MOD 30 MIN: CPT | Performed by: NURSE PRACTITIONER

## 2019-01-28 NOTE — PROGRESS NOTES
OFFICE FOLLOW UP - Nephrology   Tresa Lay 66 y o  female MRN: [id number]       ASSESSMENT and PLAN:  Diagnoses and all orders for this visit:    Essential hypertension    Chronic kidney disease, stage 3 ([address] )  -     Basic metabolic panel; Future  -     Urinalysis with microscopic  -     Protein / creatinine ratio, urine    Secondary renal hyperparathyroidism (HCC)  -     Vitamin D 25 hydroxy; Future    Rectal prolapse    Renal cyst    Vitamin D deficiency    Bipolar 1 disorder, depressed, severe (HCC)    Eating disorder    Hypernatremia  -     Sodium, urine, random; Future  -     Osmolality, urine; Future  -     Osmolality; Future        1  Hypertension:  The patient was instructed to check her blood pressure at home 2 times a day for 5 days over the phone  Her systolic blood pressure was elevated greater than 150  She was placed on amiloride 5 mg PO BID and Norvasc 5 mg PO BID on 01/18/2019  She was instructed to cut back the sodium in her diet    -please continue to follow low-salt diet   -please continue to check blood pressure 2 times a day into your next follow-up appointment  -we will reassess the need for altering medications during her next visit  -her systolic blood pressure range from 140-160 since initiating medications on 01/18/2019    -her blood pressure correlates in the office today   -blood pressure in her left arm was 153/91 per her machine, and 144/88 manually  Her blood pressure in her right arm was 147/93 per machine, and 156/86 manually  2  CKD stage III: Baseline creatinine around 2 0  Likely secondary to long-term lithium use  The patient was on this in her 25s  She also has proteinuric kidney disease  Renal ultrasound shows bilateral echogenic kidneys consistent with medical renal disease    Her most recent creatinine was slightly elevated at 2 2   -would recommend attending [facility] classes, provided with referral today     -we will repeat blood work prior to next appointment  3  Bilateral renal cysts:  Stable on most recent ultrasound in December 2018  - Will continue to monitor  4  Hypernatremia:   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  In the past her serum sodium did go to 153, it is now improving and most recently was 142  She is drinking plenty of fluids   -Repeat BMP  -will check a urine osm  , serum osmolalit, and urine Na  5  Secondary hyperparathyroidism:  Her last PTH was 87 1 on 01/24/2019  Her phosphorus level was normal at 3 8, her Mag was 2 4   -continue on vitamin D3 2000 units daily  -will check vitamin-D level  6  Bipolar disorder:  No longer on lithium  She was taking laxatives to control her weight  She denies the use of laxatives  Follows with [employer]  Her previous therapist and psychiatrist retired  She feels that she is doing well from psychiatric standpoint  7  Rectal prolapse: Had part of her colon removed 07/28/2018 by Dr Jose Melo  8  Eating disorder: States that she has gained weight  She is focused on her weight today in the office  She is asking if she is able to be prescribed medication for weight loss  We discussed her eating habits and a corporate in exercise and her daily routine  Patient has follow-up scheduled on 02/05/2019 with Dr Sammie Mata  HPI: Bj Navarro is a 66 y o  female with past medical history of CKD 3, hypertension, and bipolar disorder who is here for follow-up for blood pressure check  The patient's systolic blood pressure was consistently elevated greater than 150 and she was placed on Amloride 5 mg PO BID and Norvasc 5 mg P O  B i d  She was instructed to check her blood pressure 2 times a day and keep a log  She was placed on MRI on 01/18  Since then her systolic blood pressure has been ranging from 140 to 163  Diastolic blood pressure 28-76    She states that her blood pressure is typically higher in her right arm, however in the office today this was not true  She did bring in her home blood pressure cuff for correlation  She states that she still is using salt in her diet but is trying to limit this  She denies the use of NSAIDs  She denies any recent illnesses  She states that she is trying to lose weight  She did ask in office today if I could prescribe anything for weight loss  She states that she only eats 1 meal per day and often wakes up at night and ate cereal   We discussed consuming several small meals throughout the day and cooperating exercise into her routine  She denies using laxatives for weight loss  She denies nausea/vomiting/diarrhea/issues with urination  ROS:   A complete review of systems was done  Pertinent positives and negatives as noted in the HPI, otherwise the review of systems is negative  Allergies: Patient has no known allergies      Medications:   Current Outpatient Prescriptions:     acetaminophen (TYLENOL) 325 mg tablet, Take 650 mg by mouth every 6 (six) hours as needed for mild pain, Disp: , Rfl:     AMILoride 5 mg tablet, Take 1 tablet (5 mg total) by mouth daily (Patient taking differently: Take 5 mg by mouth 2 (two) times a day  ), Disp: 30 tablet, Rfl: 3    amLODIPine (NORVASC) 5 mg tablet, Take 5 mg by mouth 2 (two) times a day  , Disp: , Rfl:     atorvastatin (LIPITOR) 40 mg tablet, Take 0 5 tablets (20 mg total) by mouth daily, Disp: 30 tablet, Rfl: 3    clonazePAM (KLONOPIN) 0 5 mg tablet, Take 1 tablet (0 5 mg total) by mouth 3 (three) times a day, Disp: 21 tablet, Rfl: 0    fluvoxaMINE (LUVOX) 100 mg tablet, Take 100 mg by mouth 3 (three) times a day  , Disp: , Rfl:     lamoTRIgine (LaMICtal) 100 mg tablet, Take 100 mg by mouth 3 (three) times a day Takes at hs, Disp: , Rfl:     omeprazole (PriLOSEC) 40 MG capsule, Take 1 capsule (40 mg total) by mouth daily at bedtime, Disp: 90 capsule, Rfl: 3    Polyethylene Glycol 3350 (MIRALAX PO), every 24 hours, Disp: , Rfl:     Psyllium (METAMUCIL FIBER) 51 7 % PACK, Take by mouth 3 (three) times a day, Disp: , Rfl:     QUEtiapine (SEROQUEL) 300 mg tablet, Take 1 tablet (300 mg total) by mouth every morning 1 in the AM     ( also takes 400 mg at bedtime), Disp: 90 tablet, Rfl: 3    QUEtiapine (SEROquel) 400 MG tablet, Take 1 tablet (400 mg total) by mouth daily at bedtime 1 at bedtime, Disp: 90 tablet, Rfl: 3    Past Medical History:   Diagnosis Date    Ankle sprain     left    Anxiety disorder     Bipolar 2 disorder ([facility])     Chronic back pain     CKD (chronic kidney disease) stage 3, GFR 30-59 ml/min ([facility])     Depression     Hypercholesteremia     Hyperlipidemia     Hypernatremia     Hypokalemia     Intervertebral disc disorder with radiculopathy of lumbosacral region     resolved: 2015    Panic attacks     Radiculitis     resolved: 2015    Secondary renal hyperparathyroidism ([facility] [address] )     Vitamin D deficiency      Past Surgical History:   Procedure Laterality Date    COLONOSCOPY  2018    DILATION AND CURETTAGE OF UTERUS      INDUCED       surgically induced    OR ERCP DX COLLECTION SPECIMEN BRUSHING/WASHING N/A 2018    Procedure: ENDOSCOPIC RETROGRADE CHOLANGIOPANCREATOGRAPHY (ERCP);   Surgeon: Saad Gaines MD;  [facility];  Service: Gastroenterology    OR LAP, SURG PROCTOPEXY N/A 2018    Procedure: ROBOTIC SIGMOID RESECTION / RECTOPEXY;  Surgeon: Leti Navarrete MD;  [facility];  Service: Colorectal    OR SIGMOIDOSCOPY FLX DX W/COLLJ SPEC BR/WA IF PFRMD N/A 2018    Procedure: Andrés Hunt;  Surgeon: Leti Navarrete MD;  [facility];  Service: Colorectal    TUBAL LIGATION Bilateral      Family History   Problem Relation Age of Onset    Bipolar disorder Mother     Mental illness Mother         depression    Heart disease Father     Hypertension Father     Other Family         Back disorder    Diabetes Family  Heart disease Family     Hypertension Family     Breast cancer Family     Stroke Family     Thyroid disease Family     Substance Abuse Neg Hx         neg fam hx      reports that she has never smoked  She has never used smokeless tobacco  She reports that she does not drink alcohol or use drugs  Physical Exam:   Vitals:    01/28/19 0925   BP: 150/92   BP Location: Left arm   Patient Position: Sitting   Cuff Size: Standard   Pulse: 96   Resp: 16   Weight: 73 2 kg (161 lb 6 oz)   Height: 5' 6" (1 676 m)     Body mass index is 26 05 kg/m²      General: no acute distress   Eyes: conjunctivae pink, anicteric sclerae  ENT: mucous membranes moist  Neck: supple, no JVD  Chest: clear to auscultation bilaterally with no wheezes, rale or rhochi  CVS: regular rate and rhythm   Abdomen: soft, non-tender, non-distended  Extremities: no lower extremity edema   Skin: no rash  Neuro: awake and alert       Lab Results:  Results for orders placed or performed in visit on 01/24/19   CBC and differential   Result Value Ref Range    WBC 5 25 4 31 - 10 16 Thousand/uL    RBC 4 31 3 81 - 5 12 Million/uL    Hemoglobin 13 5 11 5 - 15 4 g/dL    Hematocrit 42 3 34 8 - 46 1 %    MCV 98 82 - 98 fL    MCH 31 3 26 8 - 34 3 pg    MCHC 31 9 31 4 - 37 4 g/dL    RDW 12 9 11 6 - 15 1 %    MPV 10 3 8 9 - 12 7 fL    Platelets 362 533 - 765 Thousands/uL    nRBC 0 /100 WBCs    Neutrophils Relative 51 43 - 75 %    Immat GRANS % 0 0 - 2 %    Lymphocytes Relative 34 14 - 44 %    Monocytes Relative 10 4 - 12 %    Eosinophils Relative 4 0 - 6 %    Basophils Relative 1 0 - 1 %    Neutrophils Absolute 2 69 1 85 - 7 62 Thousands/µL    Immature Grans Absolute 0 01 0 00 - 0 20 Thousand/uL    Lymphocytes Absolute 1 78 0 60 - 4 47 Thousands/µL    Monocytes Absolute 0 51 0 17 - 1 22 Thousand/µL    Eosinophils Absolute 0 20 0 00 - 0 61 Thousand/µL    Basophils Absolute 0 06 0 00 - 0 10 Thousands/µL   Comprehensive metabolic panel   Result Value Ref Range Sodium 142 136 - 145 mmol/L    Potassium 4 3 3 5 - 5 3 mmol/L    Chloride 106 100 - 108 mmol/L    CO2 28 21 - 32 mmol/L    ANION GAP 8 4 - 13 mmol/L    BUN 35 (H) 5 - 25 mg/dL    Creatinine 2 17 (H) 0 60 - 1 30 mg/dL    Glucose, Fasting 115 (H) 65 - 99 mg/dL    Calcium 10 0 8 3 - 10 1 mg/dL    AST 30 5 - 45 U/L    ALT 41 12 - 78 U/L    Alkaline Phosphatase 187 (H) 46 - 116 U/L    Total Protein 8 0 6 4 - 8 2 g/dL    Albumin 4 2 3 5 - 5 0 g/dL    Total Bilirubin 0 40 0 20 - 1 00 mg/dL    eGFR 25 ml/min/1 73sq m   Magnesium   Result Value Ref Range    Magnesium 2 4 1 6 - 2 6 mg/dL   Phosphorus   Result Value Ref Range    Phosphorus 3 8 2 7 - 4 5 mg/dL   PTH, intact   Result Value Ref Range    PTH 87 1 (H) 18 4 - 80 1 pg/mL         Results from last 7 days  Lab Units 01/24/19  0733   WBC Thousand/uL 5 25   HEMOGLOBIN g/dL 13 5   HEMATOCRIT % 42 3   PLATELETS Thousands/uL 255   POTASSIUM mmol/L 4 3   CHLORIDE mmol/L 106   CO2 mmol/L 28   BUN mg/dL 35*   CREATININE mg/dL 2 17*   CALCIUM mg/dL 10 0   MAGNESIUM mg/dL 2 4   PHOSPHORUS mg/dL 3 8         Portions of the record may have been created with voice recognition software  Occasional wrong word or "sound a like" substitutions may have occurred due to the inherent limitations of voice recognition software  Read the chart carefully and recognize, using context, where substitutions have occurred  If you have any questions, please contact the dictating provider

## 2019-01-28 NOTE — PATIENT INSTRUCTIONS
We talked about the fact that you have some underlying kidney disease  Your most recent creatinine was 2 17  We will have you repeat this prior to your next appointment  We checked her blood pressure monitor in the office today  It is working well  Continue to check her blood pressure 2 times a day until your next follow-up appointment on 02/05/2019 with Dr Dariel Jones  Please continue to limit the salt in your diet  Try to eat several small meals throughout the day  Limit year intake at night  Try to incorporate exercise into her daily routine  Please continue to take your blood pressure medications as prescribed  We have reviewed the proper technique to taking her blood pressure  Your most recent renal ultrasound showed that you have stable bilateral renal cysts  We will provide you with a referral for the [facility] classes today  Please schedule an appointment to a tenderness  Chronic Hypertension   WHAT YOU SHOULD KNOW:   Chronic hypertension is a long-term condition in which your blood pressure (BP) is higher than normal  Your BP is the force of your blood moving against the walls of your arteries  Normal blood pressure is less than 120/80  Prehypertension is BP between 120/80 and 139/89  High blood pressure is 140/90 or higher  AFTER YOU LEAVE:   Medicines: The following medicines may be ordered for you:  · Blood pressure medicine is given to lower your blood pressure  A controlled blood pressure helps protect your organs, such as your heart, lungs, brain, and kidneys  You may need more than 1 type of blood pressure medicine  Take your blood pressure medicine exactly as directed  · Diuretics help decrease extra fluid that collects in your body  This will help lower your BP  You may urinate more often while you take this medicine  · Take your medicine as directed  Call your healthcare provider if you think your medicine is not helping or if you have side effects   Tell him if you are allergic to any medicine  Keep a list of the medicines, vitamins, and herbs you take  Include the amounts, and when and why you take them  Bring the list or the pill bottles to follow-up visits  Carry your medicine list with you in case of an emergency  Follow up with your healthcare provider as directed: You will need to return to have your blood pressure checked and to have other lab tests done  Write down your questions so you remember to ask them during your visits  Self-care:  · Take your BP at home  Sit and rest for 5 minutes before you take your BP  Extend your arm and support it on a flat surface  Your arm should be at the same level as your heart  Follow the directions that came with your BP monitor  If possible, take at least 2 BP readings each time  Take your BP at least twice a day at the same times each day, such as morning and evening  Keep a log of your BP readings and bring it to your follow-up visits  Ask your healthcare provider what your blood pressure should be  · Eat less sodium  Foods that are high in sodium are table salt and salty foods, such as canned foods, potato chips, and cold cuts  Your healthcare provider may suggest that you follow the [address] [place] [address] Street  The plan is low in sodium, unhealthy fats, and total fat  It is high in potassium, calcium, and fiber  You get these nutrients by eating more fruits, vegetables, and whole grains  Ask your healthcare provider or dietitian what meal plan you should follow  · Exercise to maintain a healthy weight  Exercise at least 30 minutes per day, on most days of the week  This will help decrease your blood pressure  Ask about the best exercise plan for you  · Decrease stress  This may help lower your BP  Learn ways to relax, such as deep breathing or listening to music  · Limit alcohol  Women should limit alcohol to 1 drink a day  Men should limit alcohol to 2 drinks a day   A drink of alcohol is 12 ounces of beer, 5 ounces of wine, or 1½ ounces of liquor  · Do not smoke: If you smoke, it is never too late to quit  Ask for information about how to stop smoking if you need help  Contact your healthcare provider if:  · You feel faint, dizzy, confused, or drowsy  · You have been taking your BP medicine and your BP is still higher than your healthcare provider says it should be  · You have questions or concerns about your condition or care  Seek care immediately or call 911 if:  · You have a severe headache or vision loss  · You have weakness in an arm or leg  · You become confused or have difficulty speaking  · You have discomfort in your chest that feels like squeezing, pressure, fullness, or pain  · You suddenly feel lightheaded or have trouble breathing  · You have pain or discomfort in your back, neck, jaw, stomach, or arm  © 2014 [employer] is for End User's use only and may not be sold, redistributed or otherwise used for commercial purposes  All illustrations and images included in CareNotes® are the copyrighted property of [employer]  or Everett Murphy  The above information is an  only  It is not intended as medical advice for individual conditions or treatments  Talk to your doctor, nurse or pharmacist before following any medical regimen to see if it is safe and effective for you

## 2019-01-29 DIAGNOSIS — E87.0 HYPERNATREMIA: ICD-10-CM

## 2019-01-31 ENCOUNTER — APPOINTMENT (OUTPATIENT)
Dept: LAB | Facility: HOSPITAL | Age: 59
End: 2019-01-31
Attending: INTERNAL MEDICINE
Payer: MEDICARE

## 2019-01-31 DIAGNOSIS — N25.81 SECONDARY RENAL HYPERPARATHYROIDISM (HCC): ICD-10-CM

## 2019-01-31 DIAGNOSIS — N18.30 CHRONIC KIDNEY DISEASE, STAGE 3 (HCC): ICD-10-CM

## 2019-01-31 DIAGNOSIS — E87.0 HYPERNATREMIA: ICD-10-CM

## 2019-01-31 DIAGNOSIS — E22.1 HYPERPROLACTINEMIA (HCC): ICD-10-CM

## 2019-01-31 LAB
25(OH)D3 SERPL-MCNC: 31 NG/ML (ref 30–100)
ANION GAP SERPL CALCULATED.3IONS-SCNC: 10 MMOL/L (ref 4–13)
BACTERIA UR QL AUTO: ABNORMAL /HPF
BILIRUB UR QL STRIP: NEGATIVE
BUN SERPL-MCNC: 38 MG/DL (ref 5–25)
CALCIUM SERPL-MCNC: 9.7 MG/DL (ref 8.3–10.1)
CHLORIDE SERPL-SCNC: 105 MMOL/L (ref 100–108)
CLARITY UR: CLEAR
CO2 SERPL-SCNC: 27 MMOL/L (ref 21–32)
COLOR UR: YELLOW
CREAT SERPL-MCNC: 2.34 MG/DL (ref 0.6–1.3)
CREAT UR-MCNC: 49.2 MG/DL
GFR SERPL CREATININE-BSD FRML MDRD: 23 ML/MIN/1.73SQ M
GLUCOSE P FAST SERPL-MCNC: 132 MG/DL (ref 65–99)
GLUCOSE UR STRIP-MCNC: NEGATIVE MG/DL
HGB UR QL STRIP.AUTO: NEGATIVE
KETONES UR STRIP-MCNC: NEGATIVE MG/DL
LEUKOCYTE ESTERASE UR QL STRIP: ABNORMAL
NITRITE UR QL STRIP: NEGATIVE
NON-SQ EPI CELLS URNS QL MICRO: ABNORMAL /HPF
OSMOLALITY UR/SERPL-RTO: 308 MMOL/KG (ref 282–298)
OSMOLALITY UR: 286 MMOL/KG
PH UR STRIP.AUTO: 7 [PH] (ref 4.5–8)
POTASSIUM SERPL-SCNC: 4.8 MMOL/L (ref 3.5–5.3)
PROLACTIN SERPL-MCNC: 22 NG/ML
PROT UR STRIP-MCNC: NEGATIVE MG/DL
PROT UR-MCNC: 23 MG/DL
PROT/CREAT UR: 0.47 MG/G{CREAT} (ref 0–0.1)
RBC #/AREA URNS AUTO: ABNORMAL /HPF
SODIUM 24H UR-SCNC: 39 MOL/L
SODIUM SERPL-SCNC: 142 MMOL/L (ref 136–145)
SP GR UR STRIP.AUTO: 1.01 (ref 1–1.03)
UROBILINOGEN UR QL STRIP.AUTO: 0.2 E.U./DL
WBC #/AREA URNS AUTO: ABNORMAL /HPF

## 2019-01-31 PROCEDURE — 84156 ASSAY OF PROTEIN URINE: CPT | Performed by: NURSE PRACTITIONER

## 2019-01-31 PROCEDURE — 36415 COLL VENOUS BLD VENIPUNCTURE: CPT

## 2019-01-31 PROCEDURE — 82306 VITAMIN D 25 HYDROXY: CPT

## 2019-01-31 PROCEDURE — 81001 URINALYSIS AUTO W/SCOPE: CPT | Performed by: NURSE PRACTITIONER

## 2019-01-31 PROCEDURE — 82570 ASSAY OF URINE CREATININE: CPT | Performed by: NURSE PRACTITIONER

## 2019-01-31 PROCEDURE — 84300 ASSAY OF URINE SODIUM: CPT

## 2019-01-31 PROCEDURE — 84146 ASSAY OF PROLACTIN: CPT

## 2019-01-31 PROCEDURE — 83930 ASSAY OF BLOOD OSMOLALITY: CPT

## 2019-01-31 PROCEDURE — 83935 ASSAY OF URINE OSMOLALITY: CPT

## 2019-01-31 PROCEDURE — 80048 BASIC METABOLIC PNL TOTAL CA: CPT

## 2019-01-31 RX ORDER — AMILORIDE HYDROCHLORIDE 5 MG/1
5 TABLET ORAL 2 TIMES DAILY
Qty: 120 TABLET | Refills: 3 | Status: SHIPPED | OUTPATIENT
Start: 2019-01-31 | End: 2019-02-11

## 2019-02-04 ENCOUNTER — OFFICE VISIT (OUTPATIENT)
Dept: NEPHROLOGY | Facility: HOSPITAL | Age: 59
End: 2019-02-04
Payer: MEDICARE

## 2019-02-04 VITALS
SYSTOLIC BLOOD PRESSURE: 160 MMHG | WEIGHT: 183.6 LBS | HEIGHT: 67 IN | BODY MASS INDEX: 28.82 KG/M2 | DIASTOLIC BLOOD PRESSURE: 90 MMHG | HEART RATE: 102 BPM

## 2019-02-04 DIAGNOSIS — I10 ESSENTIAL HYPERTENSION: ICD-10-CM

## 2019-02-04 DIAGNOSIS — E55.9 VITAMIN D DEFICIENCY: ICD-10-CM

## 2019-02-04 DIAGNOSIS — N18.30 CHRONIC KIDNEY DISEASE, STAGE 3 (HCC): ICD-10-CM

## 2019-02-04 DIAGNOSIS — F50.9 EATING DISORDER: ICD-10-CM

## 2019-02-04 DIAGNOSIS — E23.2 DIABETES INSIPIDUS (HCC): ICD-10-CM

## 2019-02-04 DIAGNOSIS — F31.4 BIPOLAR 1 DISORDER, DEPRESSED, SEVERE (HCC): ICD-10-CM

## 2019-02-04 DIAGNOSIS — N25.81 SECONDARY RENAL HYPERPARATHYROIDISM (HCC): Primary | ICD-10-CM

## 2019-02-04 DIAGNOSIS — N28.1 RENAL CYST: ICD-10-CM

## 2019-02-04 PROCEDURE — 99214 OFFICE O/P EST MOD 30 MIN: CPT | Performed by: INTERNAL MEDICINE

## 2019-02-04 RX ORDER — CARVEDILOL 3.12 MG/1
3.12 TABLET ORAL 2 TIMES DAILY WITH MEALS
Qty: 60 TABLET | Refills: 2 | Status: SHIPPED | OUTPATIENT
Start: 2019-02-04 | End: 2019-05-04 | Stop reason: SDUPTHER

## 2019-02-04 NOTE — LETTER
February 4, 2019     [facility], [address] [place] [address] [address] [address]    Patient: Kacey Marquez   YOB: 1960   Date of Visit: 2/4/2019       Dear Dr Mortensen Flight: Thank you for referring Maximo Cagle to me for evaluation  Below are my notes for this consultation  If you have questions, please do not hesitate to call me  I look forward to following your patient along with you           Sincerely,        Yunior Lui,         CC: No Recipients

## 2019-02-04 NOTE — PATIENT INSTRUCTIONS

## 2019-02-04 NOTE — PROGRESS NOTES
OFFICE FOLLOW UP - Nephrology   Kacey Marquez 66 y o  female MRN: [id number]    Encounter: [id number]        ASSESSMENT and PLAN:  Diagnoses and all orders for this visit:    74-year-old female with a past medical history of bipolar disorder type 2, stage 3 chronic kidney disease who presents for follow-up    Chronic kidney disease, stage 3  - creatinine continues to trend up and has been around 2  - she is currently not on any nephrotoxic agent,  Was on lithium for several years  -  her creatinine is a remained stable around 2 0, her chronic kidney disease is related to multiple factors chronic lithium use in the past with a history of overdose on lithium, chronic lactulose abuse as well which creates a pre renal state  - renal cyst is stable in size  -she is off her potassium supplementation  -blood pressures have been well controlled  -she does have proteinuric kidney disease in this is been evaluated with serologic workup which has been negative  -she has had echogenic kidneys that have been small in size since at least 2016  -will schedule her for a CKD education course-which she will be completing later this month    Bipolar 2 disorder ([facility] ), eating disorder  - she was on lithium in her 25s and may have some chronic interstitial nephritis associated with the she also has some mild hypernatremia  - she is currently being treated with Seroquel  -she is now following up with a psychiatrist and psychologist monthly, she does also have a history of an eating disorder, today fixated on weight loss    Cyst of right kidney  - she has had recent MRIs and CT scan will repeat a renal ultrasound in  2019 vto make sure that this is stable  -bilateral simple renal cysts that are unchanged from renal ultrasound from December    Secondary renal hyperparathyroidism [facility])  - she has a PTH around 150  -  continue vitamin D3 2000 units daily    Proteinuria  - she has a positive urine protein to creatinine ratio but a negative urinalysis she is  mildly anemic with CKD  She was on lithium several years ago she also has had acute kidney injury from chronic lactulose  Use  -  limited serologic workup was negative    hypertension  - blood pressures are well controlled with amlodipine will transition to Amiloride because of hypernatremia  -blood pressures were in the 699T to 434 systolic at home  -Amlodipine was re-initiated and up titrated to 5 mg twice daily and Amiloride was up titrated to 5 mg p o  B i d   -home blood pressure readings are averaging 142/93 to 177/98-office blood pressures initially with an oscillometric BP cuff were 160/90 blood pressures checked with a manual were 141/89 to 140/90 and blood pressure checked with her blood pressure machine was 140/89  -blood pressures are still slightly above range, do not 1 up prescribed further diuretics because of her above eating disorder she has used diuretics and lactulose for weight loss in the past,  -will hold off on renin angiotensin aldosterone blockade, hold off on metoprolol because of side effects of worsening depression, we will attempt low-dose Coreg see if this improves blood pressure overall    Hypernatremia  -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 in the past her serum sodiums did go to 153  -increased Amiloride to 5 mg twice daily and her serum sodium has improved to 140 to    Elevated LFTs  -LFTs are stable  -continue statin    Vitamin-D deficiency-now improved on supplementation    Secondary hyperparathyroidism will continue to check PTH    Overall jones is stable, there is concern that her psychiatric illness will cause further physical illness she is following up regularly with her psychiatrist, will try and control blood pressure but avoid hypotension and reduced risk for CKD progression    Will continue surveillance blood work in follow-up every 3 months    HPI: Crystal Thayer is a 66 y o  female who is here for Follow-up and Chronic Kidney Disease    She has a past medical history of bipolar disorder and an eating disorder, she has had fluctuations in her weight she was using diuretics and lactulose for weight control and was as low as 120 lb she is very concerned that her weight is 180 lb she currently denies any new chest pain shortness of breath fevers or chills she has eliminated salt most recently as well her blood pressures have been elevated in the 556 systolic at home her amlodipine was restarted and up titrated to maximum dose and her Amiloride was increased to 5 mg twice daily  Blood pressures still are around 140-1 70 over 90 on average at home her blood pressure cough was correlated with office blood pressures and were similar  She still has some foamy urine and some dull pain on her backside but it otherwise in her usual state held she is following up regularly with her psychologist and psychiatrist and checking her weights and blood pressures regular    ROS:   All the systems were reviewed and were negative except as documented on the HPI  Allergies: Patient has no known allergies      Medications:   Current Outpatient Prescriptions:     acetaminophen (TYLENOL) 325 mg tablet, Take 650 mg by mouth every 6 (six) hours as needed for mild pain, Disp: , Rfl:     AMILoride 5 mg tablet, Take 1 tablet (5 mg total) by mouth 2 (two) times a day, Disp: 120 tablet, Rfl: 3    amLODIPine (NORVASC) 5 mg tablet, Take 5 mg by mouth 2 (two) times a day  , Disp: , Rfl:     atorvastatin (LIPITOR) 40 mg tablet, Take 0 5 tablets (20 mg total) by mouth daily, Disp: 30 tablet, Rfl: 3    clonazePAM (KLONOPIN) 0 5 mg tablet, Take 1 tablet (0 5 mg total) by mouth 3 (three) times a day, Disp: 21 tablet, Rfl: 0    fluvoxaMINE (LUVOX) 100 mg tablet, Take 100 mg by mouth 3 (three) times a day  , Disp: , Rfl:     lamoTRIgine (LaMICtal) 100 mg tablet, Take 100 mg by mouth 3 (three) times a day Takes at hs, Disp: , Rfl:     omeprazole (PriLOSEC) 40 MG capsule, Take 1 capsule (40 mg total) by mouth daily at bedtime, Disp: 90 capsule, Rfl: 3    Polyethylene Glycol 3350 (MIRALAX PO), every 24 hours, Disp: , Rfl:     Psyllium (METAMUCIL FIBER) 51 7 % PACK, Take by mouth 3 (three) times a day, Disp: , Rfl:     QUEtiapine (SEROQUEL) 300 mg tablet, Take 1 tablet (300 mg total) by mouth every morning 1 in the AM     ( also takes 400 mg at bedtime), Disp: 90 tablet, Rfl: 3    QUEtiapine (SEROquel) 400 MG tablet, Take 1 tablet (400 mg total) by mouth daily at bedtime 1 at bedtime, Disp: 90 tablet, Rfl: 3    carvedilol (COREG) 3 125 mg tablet, Take 1 tablet (3 125 mg total) by mouth 2 (two) times a day with meals, Disp: 60 tablet, Rfl: 2    Past Medical History:   Diagnosis Date    Ankle sprain     left    Anxiety disorder     Bipolar 2 disorder (HCC)     Chronic back pain     CKD (chronic kidney disease) stage 3, GFR 30-59 ml/min (HCC)     Depression     Hypercholesteremia     Hyperlipidemia     Hypernatremia     Hypokalemia     Intervertebral disc disorder with radiculopathy of lumbosacral region     resolved: 2015    Panic attacks     Radiculitis     resolved: 2015    Secondary renal hyperparathyroidism (HCC)     Vitamin D deficiency      Past Surgical History:   Procedure Laterality Date    COLONOSCOPY      DILATION AND CURETTAGE OF UTERUS      INDUCED       surgically induced    WI ERCP DX COLLECTION SPECIMEN BRUSHING/WASHING N/A 2018    Procedure: ENDOSCOPIC RETROGRADE CHOLANGIOPANCREATOGRAPHY (ERCP);   Surgeon: Rissa Martinez MD;  [facility];  Service: Gastroenterology    WI LAP, SURG PROCTOPEXY N/A 2018    Procedure: ROBOTIC SIGMOID RESECTION / RECTOPEXY;  Surgeon: Sabiha Anthony MD;  [facility];  Service: Colorectal    WI SIGMOIDOSCOPY FLX DX W/COLLJ SPEC BR/WA IF PFRMD N/A 2018    Procedure: Chuck Goss;  Surgeon: Emily Serna Sunita Mooney MD;  [facility];  Service: Colorectal    TUBAL LIGATION Bilateral 1997     Family History   Problem Relation Age of Onset    Bipolar disorder Mother     Mental illness Mother         depression    Heart disease Father     Hypertension Father     Other Family         Back disorder    Diabetes Family     Heart disease Family     Hypertension Family     Breast cancer Family     Stroke Family     Thyroid disease Family     Substance Abuse Neg Hx         neg fam hx      reports that she has never smoked  She has never used smokeless tobacco  She reports that she does not drink alcohol or use drugs  Physical Exam:   Vitals:    02/04/19 1015   BP: 160/90   BP Location: Left arm   Patient Position: Sitting   Cuff Size: Standard   Pulse: 102   Weight: 83 3 kg (183 lb 9 6 oz)   Height: 5' 7" (1 702 m)     Body mass index is 28 76 kg/m²  General: conscious, cooperative, in not acute distress  Eyes: conjunctivae pink, anicteric sclerae  ENT: lips and mucous membranes moist  Neck: supple, no JVD  Chest: clear breath sounds bilateral, no crackles, ronchus or wheezings  CVS: distinct S1 & S2, normal rate, regular rhythm  Abdomen: soft, non-tender, non-distended, normoactive bowel sounds  Extremities: no edema of both legs  Skin: no rash  Neuro: awake, alert, oriented      Lab Results:    Results from last 7 days  Lab Units 01/31/19  0739   POTASSIUM mmol/L 4 8   CHLORIDE mmol/L 105   CO2 mmol/L 27   BUN mg/dL 38*   CREATININE mg/dL 2 34*   CALCIUM mg/dL 9 7       Portions of the record may have been created with voice recognition software  Occasional wrong word or "sound a like" substitutions may have occurred due to the inherent limitations of voice recognition software  Read the chart carefully and recognize, using context, where substitutions have occurred  If you have any questions, please contact the dictating provider

## 2019-02-11 ENCOUNTER — APPOINTMENT (EMERGENCY)
Dept: RADIOLOGY | Facility: HOSPITAL | Age: 59
End: 2019-02-11
Payer: MEDICARE

## 2019-02-11 ENCOUNTER — HOSPITAL ENCOUNTER (EMERGENCY)
Facility: HOSPITAL | Age: 59
Discharge: HOME | End: 2019-02-11
Attending: EMERGENCY MEDICINE | Admitting: EMERGENCY MEDICINE
Payer: MEDICARE

## 2019-02-11 ENCOUNTER — APPOINTMENT (EMERGENCY)
Dept: CT IMAGING | Facility: HOSPITAL | Age: 59
End: 2019-02-11
Payer: MEDICARE

## 2019-02-11 VITALS
HEIGHT: 67 IN | DIASTOLIC BLOOD PRESSURE: 70 MMHG | SYSTOLIC BLOOD PRESSURE: 136 MMHG | WEIGHT: 175 LBS | HEART RATE: 86 BPM | TEMPERATURE: 97.6 F | BODY MASS INDEX: 27.47 KG/M2 | OXYGEN SATURATION: 95 % | RESPIRATION RATE: 18 BRPM

## 2019-02-11 DIAGNOSIS — S09.90XA MINOR HEAD INJURY: Primary | ICD-10-CM

## 2019-02-11 DIAGNOSIS — S39.012A ACUTE LUMBAR MYOFASCIAL STRAIN: ICD-10-CM

## 2019-02-11 DIAGNOSIS — S80.11XA CONTUSION OF MULTIPLE SITES OF RIGHT LEG: ICD-10-CM

## 2019-02-11 DIAGNOSIS — S16.1XXA ACUTE CERVICAL MYOFASCIAL STRAIN: ICD-10-CM

## 2019-02-11 PROCEDURE — 99284 EMERGENCY DEPT VISIT MOD MDM: CPT

## 2019-02-11 PROCEDURE — 73564 X-RAY EXAM KNEE 4 OR MORE: CPT

## 2019-02-11 PROCEDURE — 73502 X-RAY EXAM HIP UNI 2-3 VIEWS: CPT

## 2019-02-11 PROCEDURE — 72040 X-RAY EXAM NECK SPINE 2-3 VW: CPT

## 2019-02-11 PROCEDURE — 70450 CT HEAD/BRAIN W/O DYE: CPT

## 2019-02-11 PROCEDURE — 72100 X-RAY EXAM L-S SPINE 2/3 VWS: CPT

## 2019-02-11 RX ORDER — IBUPROFEN 600 MG/1
600 TABLET ORAL EVERY 6 HOURS PRN
Qty: 30 TABLET | Refills: 0 | Status: SHIPPED | OUTPATIENT
Start: 2019-02-11 | End: 2019-03-11 | Stop reason: SINTOL

## 2019-02-11 RX ORDER — OXYCODONE HYDROCHLORIDE AND ACETAMINOPHEN 5; 325 MG/1; MG/1
1 TABLET ORAL ONCE
Status: COMPLETED | OUTPATIENT
Start: 2019-02-11 | End: 2019-02-11

## 2019-02-11 RX ORDER — TRAMADOL HYDROCHLORIDE 50 MG/1
50 TABLET ORAL EVERY 6 HOURS PRN
Qty: 12 TABLET | Refills: 0 | Status: SHIPPED | OUTPATIENT
Start: 2019-02-11 | End: 2019-02-21

## 2019-02-11 RX ORDER — QUETIAPINE FUMARATE 400 MG/1
TABLET, FILM COATED ORAL
COMMUNITY
End: 2020-07-27 | Stop reason: ALTCHOICE

## 2019-02-11 RX ADMIN — OXYCODONE HYDROCHLORIDE AND ACETAMINOPHEN 1 TABLET: 5; 325 TABLET ORAL at 10:07

## 2019-02-11 NOTE — ED PROVIDER NOTES
History  Chief Complaint   Patient presents with    Fall     pt states she fell down 10 steps Saturday around 7:30 am  - LOC  denies numbness in legs  - thinners  - ETOH  59y F here for evaluation after a fall on Saturday  Lost footing and fell down 10 wooden steps  Hit head, no loc  Has been having persistent HA, neck and low back pain, right leg pain w/ some paresthesias  Notes it is hard to walk on the right leg due to the pain  Has multiple abrasions and bruises to the right side  History provided by:  Patient   used: No    Fall   Mechanism of injury: fall    Injury location:  Torso and leg  Torso injury location:  Back  Leg injury location:  R upper leg, R lower leg and R knee  Incident location:  Home  Arrived directly from scene: no    Fall:     Fall occurred:  Down stairs    Impact surface: wooden steps  Point of impact:  Head, back and knees    Entrapped after fall: no    Protective equipment: none    Suspicion of alcohol use: no    Suspicion of drug use: no    Prior to arrival data:     Bystander interventions:  None    Blood loss:  None    Responsiveness at scene:  Alert    Orientation at scene:  Person, place, situation and time    Loss of consciousness: no      Amnesic to event: no    Associated symptoms: back pain, headaches and neck pain        Prior to Admission Medications   Prescriptions Last Dose Informant Patient Reported? Taking?    Polyethylene Glycol 3350 (MIRALAX PO)  Self Yes No   Sig: Take by mouth as needed    QUEtiapine (SEROQUEL) 300 mg tablet  Self No Yes   Sig: Take 1 tablet (300 mg total) by mouth every morning 1 in the AM     ( also takes 400 mg at bedtime)   QUEtiapine (SEROquel) 400 MG tablet   Yes Yes   Sig: Take 400 mg by mouth daily   acetaminophen (TYLENOL) 325 mg tablet  Self Yes Yes   Sig: Take 650 mg by mouth every 6 (six) hours as needed for mild pain   amLODIPine (NORVASC) 5 mg tablet  Self Yes Yes   Sig: Take 5 mg by mouth 2 (two) times a day     atorvastatin (LIPITOR) 40 mg tablet  Self No Yes   Sig: Take 0 5 tablets (20 mg total) by mouth daily   carvedilol (COREG) 3 125 mg tablet   No Yes   Sig: Take 1 tablet (3 125 mg total) by mouth 2 (two) times a day with meals   clonazePAM (KLONOPIN) 0 5 mg tablet  Self No Yes   Sig: Take 1 tablet (0 5 mg total) by mouth 3 (three) times a day   fluvoxaMINE (LUVOX) 100 mg tablet  Self Yes Yes   Sig: Take 100 mg by mouth 3 (three) times a day     lamoTRIgine (LaMICtal) 100 mg tablet  Self Yes Yes   Sig: Take 100 mg by mouth 3 (three) times a day Pt takes 3 times daily   omeprazole (PriLOSEC) 40 MG capsule  Self No Yes   Sig: Take 1 capsule (40 mg total) by mouth daily at bedtime      Facility-Administered Medications: None       Past Medical History:   Diagnosis Date    Ankle sprain     left    Anxiety disorder     Bipolar 2 disorder ([facility])     Chronic back pain     CKD (chronic kidney disease) stage 3, GFR 30-59 ml/min ([facility])     Depression     Hypercholesteremia     Hyperlipidemia     Hypernatremia     Hypokalemia     Intervertebral disc disorder with radiculopathy of lumbosacral region     resolved: 2015    Panic attacks     Radiculitis     resolved: 2015    Secondary renal hyperparathyroidism ([facility] [address] )     Vitamin D deficiency        Past Surgical History:   Procedure Laterality Date    COLONOSCOPY      DILATION AND CURETTAGE OF UTERUS      INDUCED       surgically induced    [place] ERCP DX COLLECTION SPECIMEN BRUSHING/WASHING N/A 2018    Procedure: ENDOSCOPIC RETROGRADE CHOLANGIOPANCREATOGRAPHY (ERCP);   Surgeon: Shannon Wang MD;  [facility];  Service: Gastroenterology    DE LAP, SURG PROCTOPEXY N/A 2018    Procedure: ROBOTIC SIGMOID RESECTION / RECTOPEXY;  Surgeon: Beena Lam MD;  [facility];  Service: Colorectal    DE SIGMOIDOSCOPY FLX DX W/COLLJ SPEC BR/WA IF PFRMD N/A 2018    Procedure: SIGMOIDOSCOPY FLEXIBLE; Surgeon: Ursula Joel MD;  [facility];  Service: Colorectal    TUBAL LIGATION Bilateral 1997       Family History   Problem Relation Age of Onset    Bipolar disorder Mother     Mental illness Mother         depression    Heart disease Father     Hypertension Father     Other Family         Back disorder    Diabetes Family     Heart disease Family     Hypertension Family     Breast cancer Family     Stroke Family     Thyroid disease Family     Substance Abuse Neg Hx         neg fam hx     I have reviewed and agree with the history as documented  Social History     Tobacco Use    Smoking status: Never Smoker    Smokeless tobacco: Never Used   Substance Use Topics    Alcohol use: No    Drug use: No        Review of Systems   Musculoskeletal: Positive for back pain, gait problem, myalgias and neck pain  Negative for joint swelling and neck stiffness  Skin: Positive for color change and wound  Neurological: Positive for headaches  Negative for weakness, light-headedness and numbness  All other systems reviewed and are negative  Physical Exam  Physical Exam   Constitutional: She is oriented to person, place, and time  She appears well-developed and well-nourished  HENT:   Head: Normocephalic  Nose: Nose normal    Mouth/Throat: Oropharynx is clear and moist    Eyes: Pupils are equal, round, and reactive to light  Conjunctivae and EOM are normal    Neck: Muscular tenderness present  No spinous process tenderness present  Cardiovascular: Normal rate and regular rhythm  Pulmonary/Chest: Effort normal and breath sounds normal  She exhibits no tenderness  Abdominal: Soft  There is no tenderness  Musculoskeletal:        Right hip: She exhibits normal range of motion and no bony tenderness  Right knee: She exhibits ecchymosis  Tenderness found  Medial joint line and lateral joint line tenderness noted  Right ankle: No tenderness          Cervical back: She exhibits no bony tenderness  Thoracic back: She exhibits no bony tenderness  Lumbar back: She exhibits tenderness  She exhibits normal range of motion  Back:         Right upper leg: She exhibits tenderness and deformity  She exhibits no bony tenderness and no swelling  Right lower leg: She exhibits no tenderness  Legs:  Neurological: She is alert and oriented to person, place, and time  Skin: Skin is warm  Capillary refill takes less than 2 seconds  Psychiatric: She has a normal mood and affect  Nursing note and vitals reviewed  Vital Signs  ED Triage Vitals   Temperature Pulse Respirations Blood Pressure SpO2   02/11/19 0844 02/11/19 0843 02/11/19 0843 02/11/19 0843 02/11/19 0843   97 6 °F (36 4 °C) 98 18 119/77 100 %      Temp Source Heart Rate Source Patient Position - Orthostatic VS BP Location FiO2 (%)   02/11/19 0844 02/11/19 0843 02/11/19 0843 02/11/19 0843 --   Temporal Monitor Sitting Left arm       Pain Score       02/11/19 0843       7           Vitals:    02/11/19 1045 02/11/19 1115 02/11/19 1200 02/11/19 1245   BP: 140/71 143/70 139/74 136/70   Pulse: 92 93 86 86   Patient Position - Orthostatic VS: Sitting Sitting Sitting Sitting       Visual Acuity  Visual Acuity      Most Recent Value   L Pupil Size (mm)  3   R Pupil Size (mm)  3          ED Medications  Medications   oxyCODONE-acetaminophen (PERCOCET) 5-325 mg per tablet 1 tablet (1 tablet Oral Given 2/11/19 1007)       Diagnostic Studies  Results Reviewed     None                 CT head without contrast   ED Interpretation by Eufemia Garcia DO (02/11 1258)   See below      Final Result by Ailyn Lopez MD (02/11 1246)      No acute intracranial abnormality  Chronic appearing left frontal lacunar infarct                    Workstation performed: [id number]         XR cervical spine 2 or 3 views   ED Interpretation by Eufemia Garcia DO (02/11 1024)   No acute findings      Final Result by Araceli Lopez MD (02/11 1043)      Mild degenerative change  Workstation performed: [id number]         XR lumbar spine 2 or 3 views   ED Interpretation by Feliberto Grayson DO (02/11 1025)   spondylolisthesis of L5, no acute fracture      Final Result by Araceli Lopez MD (02/11 1039)      Stable examination  Workstation performed: [id number]         XR knee 4+ views Right injury   ED Interpretation by Feliberto Grayson DO (02/11 1025)   No acute findings      Final Result by Araceli Lopez MD (02/11 1038)      No acute osseous abnormality  Workstation performed: [id number]         XR hip/pelv 2-3 vws right   ED Interpretation by Feliberto Grayson DO (02/11 1025)   No acute findings      Final Result by Araceli Lopez MD (02/11 1041)      No acute osseous abnormality              Workstation performed: [id number]                    Procedures  Procedures       Phone Contacts  ED Phone Contact    ED Course  ED Course as of Feb 11 1949 Mon Feb 11, 2019   1346 Pdmp reviewed, only receives clonazepam                                  MDM  Number of Diagnoses or Management Options  Acute cervical myofascial strain: new and requires workup  Acute lumbar myofascial strain: new and requires workup  Contusion of multiple sites of right leg: new and requires workup  Minor head injury: new and requires workup     Amount and/or Complexity of Data Reviewed  Tests in the radiology section of CPT®: reviewed and ordered  Independent visualization of images, tracings, or specimens: yes        Disposition  Final diagnoses:   Minor head injury   Acute lumbar myofascial strain   Acute cervical myofascial strain   Contusion of multiple sites of right leg     Time reflects when diagnosis was documented in both MDM as applicable and the Disposition within this note     Time User Action Codes Description Comment    2/11/2019 12:58 PM Rizwana Colón Add [S09 90XA] Minor head injury     2/11/2019 12:58 PM Lucila Rueda Add [id number]  1XXA] Cervical strain     2/11/2019 12:58 PM Rizwana Colón Add [S39 012A] Acute lumbar myofascial strain     2/11/2019 12:58 PM Rizwana Colón Remove [S16  1XXA] Cervical strain     2/11/2019 12:58 PM Rizwana Colón Add [S16  1XXA] Acute cervical myofascial strain     2/11/2019 12:59 PM Rizwana Colón Add [S80 11XA] Contusion of multiple sites of right leg       ED Disposition     ED Disposition Condition Date/Time Comment    Discharge Good Mon Feb 11, 2019 12:58 PM Jazzy discharge to home/self care              Follow-up Information    None         Discharge Medication List as of 2/11/2019  1:00 PM      START taking these medications    Details   ibuprofen (MOTRIN) 600 mg tablet Take 1 tablet (600 mg total) by mouth every 6 (six) hours as needed for moderate pain, Starting Mon 2/11/2019, Normal      traMADol (ULTRAM) 50 mg tablet Take 1 tablet (50 mg total) by mouth every 6 (six) hours as needed for moderate pain for up to 10 days, Starting Mon 2/11/2019, Until u 2/21/2019, Normal         CONTINUE these medications which have NOT CHANGED    Details   acetaminophen (TYLENOL) 325 mg tablet Take 650 mg by mouth every 6 (six) hours as needed for mild pain, Historical Med      amLODIPine (NORVASC) 5 mg tablet Take 5 mg by mouth 2 (two) times a day  , Historical Med      atorvastatin (LIPITOR) 40 mg tablet Take 0 5 tablets (20 mg total) by mouth daily, Starting Tue 11/13/2018, Normal      carvedilol (COREG) 3 125 mg tablet Take 1 tablet (3 125 mg total) by mouth 2 (two) times a day with meals, Starting Mon 2/4/2019, Normal      clonazePAM (KLONOPIN) 0 5 mg tablet Take 1 tablet (0 5 mg total) by mouth 3 (three) times a day, Starting Mon 1/7/2019, Normal      fluvoxaMINE (LUVOX) 100 mg tablet Take 100 mg by mouth 3 (three) times a day  , Starting Wed 10/10/2018, Historical Med      lamoTRIgine (LaMICtal) 100 mg tablet Take 100 mg by mouth 3 (three) times a day Pt takes 3 times daily, Historical Med      omeprazole (PriLOSEC) 40 MG capsule Take 1 capsule (40 mg total) by mouth daily at bedtime, Starting Tue 6/19/2018, Normal      !! QUEtiapine (SEROQUEL) 300 mg tablet Take 1 tablet (300 mg total) by mouth every morning 1 in the AM     ( also takes 400 mg at bedtime), Starting Wed 1/2/2019, Normal      !! QUEtiapine (SEROquel) 400 MG tablet Take 400 mg by mouth daily, Historical Med      Polyethylene Glycol 3350 (MIRALAX PO) Take by mouth as needed , Historical Med       !! - Potential duplicate medications found  Please discuss with provider  No discharge procedures on file      ED Provider  Electronically Signed by           Leelee Sandoval DO  02/11/19 Joseline

## 2019-02-13 DIAGNOSIS — I10 ESSENTIAL HYPERTENSION: Primary | ICD-10-CM

## 2019-02-13 RX ORDER — AMLODIPINE BESYLATE 5 MG/1
5 TABLET ORAL 2 TIMES DAILY
Qty: 180 TABLET | Refills: 3 | Status: SHIPPED | OUTPATIENT
Start: 2019-02-13 | End: 2019-11-05

## 2019-02-15 ENCOUNTER — VBI (OUTPATIENT)
Dept: ADMINISTRATIVE | Facility: OTHER | Age: 59
End: 2019-02-15

## 2019-02-25 ENCOUNTER — OFFICE VISIT (OUTPATIENT)
Dept: UROLOGY | Facility: HOSPITAL | Age: 59
End: 2019-02-25
Attending: INTERNAL MEDICINE
Payer: MEDICARE

## 2019-02-25 VITALS
BODY MASS INDEX: 28.91 KG/M2 | HEIGHT: 67 IN | HEART RATE: 100 BPM | WEIGHT: 184.2 LBS | SYSTOLIC BLOOD PRESSURE: 124 MMHG | DIASTOLIC BLOOD PRESSURE: 80 MMHG

## 2019-02-25 DIAGNOSIS — N32.89 BLADDER DISTENSION: ICD-10-CM

## 2019-02-25 LAB — POST-VOID RESIDUAL VOLUME, ML POC: 21.4 ML

## 2019-02-25 PROCEDURE — 51798 US URINE CAPACITY MEASURE: CPT | Performed by: NURSE PRACTITIONER

## 2019-02-25 PROCEDURE — 99204 OFFICE O/P NEW MOD 45 MIN: CPT | Performed by: NURSE PRACTITIONER

## 2019-02-25 NOTE — PROGRESS NOTES
2/25/2019    [facility]  1960  [id number]        Assessment  -Bilateral simple renal cysts    Discussion/Plan  Kody Forde is a 66 y o  female being managed by Dr Jose Cortez  -PVR is 21cc  -We reviewed results of her recent renal ultrasound which showed no evidence of any significant findings  Bilateral simple renal cysts were noted   -instructed patient to perform Kegel exercises for episodes of urinary stress incontinence  We also reviewed dietary and behavioral modifications such as avoiding bladder irritants for urinary urgency  Offered patient prescription for anticholinergic, however she defers at this time  -Patient will follow up on as-needed basis  She was instructed to call with any issues  -All questions answered, patients agree with plan     History of Present Illness  66 y o  female who presents in consultation today for finding of bladder distension on renal US  Patient was advised by PCP for further evaluation  A recent renal ultrasound performed 12/18/2018 identified bilateral simple renal cyst   No hydronephrosis, renal calculi, or bladder distention noted  Patient denies any prior urologic history or surgical intervention  She reports longstanding history of mixed urinary incontinence  She denies any episodes of gross hematuria or dysuria  Patient states she wears 1 sanitary pad daily for protection  She does state drinking soda throughout the day  Patient denies any strong family history of bladder or kidney malignancies  Additional PMH includes CKD 3  Review of Systems  Review of Systems   Constitutional: Negative  HENT: Negative  Respiratory: Negative  Cardiovascular: Negative  Gastrointestinal: Negative  Genitourinary: Negative for decreased urine volume, difficulty urinating, dysuria, flank pain, frequency and hematuria  Urgency: Occasional    Musculoskeletal: Negative  Skin: Negative  Neurological: Negative  Psychiatric/Behavioral: Negative  Past Medical History  Past Medical History:   Diagnosis Date    Ankle sprain     left    Anxiety disorder     Bipolar 2 disorder (HCC)     Chronic back pain     CKD (chronic kidney disease) stage 3, GFR 30-59 ml/min (HCC)     Depression     Hypercholesteremia     Hyperlipidemia     Hypernatremia     Hypokalemia     Intervertebral disc disorder with radiculopathy of lumbosacral region     resolved: 2015    Panic attacks     Radiculitis     resolved: 2015    Secondary renal hyperparathyroidism ([facility] [address] )     Vitamin D deficiency        Past Social History  Past Surgical History:   Procedure Laterality Date    COLONOSCOPY  2018    DILATION AND CURETTAGE OF UTERUS      INDUCED       surgically induced    CA ERCP DX COLLECTION SPECIMEN BRUSHING/WASHING N/A 2018    Procedure: ENDOSCOPIC RETROGRADE CHOLANGIOPANCREATOGRAPHY (ERCP);   Surgeon: Saad Gaines MD;  [facility];  Service: Gastroenterology    CA LAP, SURG PROCTOPEXY N/A 2018    Procedure: ROBOTIC SIGMOID RESECTION / RECTOPEXY;  Surgeon: Leti Navarrete MD;  [facility];  Service: Colorectal    [place] SIGMOIDOSCOPY FLX DX W/COLLJ SPEC BR/WA IF PFRMD N/A 2018    Procedure: Andrés Hunt;  Surgeon: Leti Navarrete MD;  [facility];  Service: Colorectal    TUBAL LIGATION Bilateral        Past Family History  Family History   Problem Relation Age of Onset    Bipolar disorder Mother     Mental illness Mother         depression    Heart disease Father     Hypertension Father     Other Family         Back disorder    Diabetes Family     Heart disease Family     Hypertension Family     Breast cancer Family     Stroke Family     Thyroid disease Family     Substance Abuse Neg Hx         neg fam hx       Past Social history  Social History     Socioeconomic History    Marital status:      Spouse name: Not on file    Number of children: Not on file    Years of education: Not on file    Highest education level: Not on file   Occupational History    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   Social Needs    Financial resource strain: Not on file    Food insecurity:     Worry: Not on file     Inability: Not on file    Transportation needs:     Medical: Not on file     Non-medical: Not on file   Tobacco Use    Smoking status: Never Smoker    Smokeless tobacco: Never Used   Substance and Sexual Activity    Alcohol use: No    Drug use: No    Sexual activity: Never   Lifestyle    Physical activity:     Days per week: Not on file     Minutes per session: Not on file    Stress: Not on file   Relationships    Social connections:     Talks on phone: Not on file     Gets together: Not on file     Attends Restorationist service: Not on file     Active member of club or organization: Not on file     Attends meetings of clubs or organizations: Not on file     Relationship status: Not on file    Intimate partner violence:     Fear of current or ex partner: Not on file     Emotionally abused: Not on file     Physically abused: Not on file     Forced sexual activity: Not on file   Other Topics Concern    Not on file   Social History Narrative    Daily caffeine consumption 2-3 servings a day    Lives with family  No living will  Has dentures---no dental care  Primary language--English  Feels safe at home         Current Medications  Current Outpatient Medications   Medication Sig Dispense Refill    acetaminophen (TYLENOL) 325 mg tablet Take 650 mg by mouth every 6 (six) hours as needed for mild pain      amLODIPine (NORVASC) 5 mg tablet Take 1 tablet (5 mg total) by mouth 2 (two) times a day 180 tablet 3    atorvastatin (LIPITOR) 40 mg tablet Take 0 5 tablets (20 mg total) by mouth daily 30 tablet 3    carvedilol (COREG) 3 125 mg tablet Take 1 tablet (3 125 mg total) by mouth 2 (two) times a day with meals 60 tablet 2    clonazePAM (KLONOPIN) 0 5 mg tablet Take 1 tablet (0 5 mg total) by mouth 3 (three) times a day 21 tablet 0    fluvoxaMINE (LUVOX) 100 mg tablet Take 100 mg by mouth 3 (three) times a day        ibuprofen (MOTRIN) 600 mg tablet Take 1 tablet (600 mg total) by mouth every 6 (six) hours as needed for moderate pain 30 tablet 0    lamoTRIgine (LaMICtal) 100 mg tablet Take 100 mg by mouth 3 (three) times a day Pt takes 3 times daily      omeprazole (PriLOSEC) 40 MG capsule Take 1 capsule (40 mg total) by mouth daily at bedtime 90 capsule 3    Polyethylene Glycol 3350 (MIRALAX PO) Take by mouth as needed       QUEtiapine (SEROQUEL) 300 mg tablet Take 1 tablet (300 mg total) by mouth every morning 1 in the AM     ( also takes 400 mg at bedtime) 90 tablet 3    QUEtiapine (SEROquel) 400 MG tablet Take 400 mg by mouth daily       No current facility-administered medications for this visit  Allergies  No Known Allergies    Past medical history, social history, family history, medications and allergies were reviewed  Vitals  There were no vitals filed for this visit  Physical Exam  Physical Exam    Results    I have personally reviewed all pertinent lab results and reviewed with patient  Lab Results   Component Value Date    CALCIUM 9 7 01/31/2019     02/27/2017    K 4 8 01/31/2019    CO2 27 01/31/2019     01/31/2019    BUN 38 (H) 01/31/2019    CREATININE 2 34 (H) 01/31/2019     Lab Results   Component Value Date    WBC 5 25 01/24/2019    HGB 13 5 01/24/2019    HCT 42 3 01/24/2019    MCV 98 01/24/2019     01/24/2019     No results found for this or any previous visit (from the past 1 hour(s))

## 2019-02-25 NOTE — LETTER
February 25, 2019     Patricio Pham, [address]  [address] [address]    Patient: Christopher Suarez   YOB: 1960   Date of Visit: 2/25/2019       Dear Dr Manav Yanes: Thank you for referring Karson Funes to me for evaluation  Below are my notes for this consultation  If you have questions, please do not hesitate to call me  I look forward to following your patient along with you  Sincerely,        EMERITA Block        CC: Alease Kussmaul, [address] EMERITA  2/25/2019 12:49 PM  Sign at close encounter  2/25/2019    Christopher Suarez  1960  [id number]        Assessment  -Bilateral simple renal cysts    Discussion/Plan  Renu Mora is a 66 y o  female being managed by Dr Mikala Phipps  -PVR is 21cc  -We reviewed results of her recent renal ultrasound which showed no evidence of any significant findings  Bilateral simple renal cysts were noted   -instructed patient to perform Kegel exercises for episodes of urinary stress incontinence  We also reviewed dietary and behavioral modifications such as avoiding bladder irritants for urinary urgency  Offered patient prescription for anticholinergic, however she defers at this time  -Patient will follow up on as-needed basis  She was instructed to call with any issues  -All questions answered, patients agree with plan     History of Present Illness  66 y o  female who presents in consultation today for finding of bladder distension on renal US  Patient was advised by PCP for further evaluation  A recent renal ultrasound performed 12/18/2018 identified bilateral simple renal cyst   No hydronephrosis, renal calculi, or bladder distention noted  Patient denies any prior urologic history or surgical intervention  She reports longstanding history of mixed urinary incontinence  She denies any episodes of gross hematuria or dysuria  Patient states she wears 1 sanitary pad daily for protection    She does state drinking soda throughout the day  Patient denies any strong family history of bladder or kidney malignancies  Additional PMH includes CKD 3  Review of Systems  Review of Systems   Constitutional: Negative  HENT: Negative  Respiratory: Negative  Cardiovascular: Negative  Gastrointestinal: Negative  Genitourinary: Negative for decreased urine volume, difficulty urinating, dysuria, flank pain, frequency and hematuria  Urgency: Occasional    Musculoskeletal: Negative  Skin: Negative  Neurological: Negative  Psychiatric/Behavioral: Negative  Past Medical History  Past Medical History:   Diagnosis Date    Ankle sprain     left    Anxiety disorder     Bipolar 2 disorder (HCC)     Chronic back pain     CKD (chronic kidney disease) stage 3, GFR 30-59 ml/min (HCC)     Depression     Hypercholesteremia     Hyperlipidemia     Hypernatremia     Hypokalemia     Intervertebral disc disorder with radiculopathy of lumbosacral region     resolved: 2015    Panic attacks     Radiculitis     resolved: 2015    Secondary renal hyperparathyroidism ([facility] [address] )     Vitamin D deficiency        Past Social History  Past Surgical History:   Procedure Laterality Date    COLONOSCOPY      DILATION AND CURETTAGE OF UTERUS      INDUCED       surgically induced    [place] ERCP DX COLLECTION SPECIMEN BRUSHING/WASHING N/A 2018    Procedure: ENDOSCOPIC RETROGRADE CHOLANGIOPANCREATOGRAPHY (ERCP);   Surgeon: Michele Suarez MD;  [facility];  Service: Gastroenterology    [place] LAP, SURG PROCTOPEXY N/A 2018    Procedure: ROBOTIC SIGMOID RESECTION / RECTOPEXY;  Surgeon: Atilio Vazquez MD;  [facility];  Service: Colorectal    [place] SIGMOIDOSCOPY FLX DX W/COLLJ SPEC BR/WA IF PFRMD N/A 2018    Procedure: Mary Lou Wiley;  Surgeon: Atilio Vazquez MD;  [facility];  Service: Colorectal    TUBAL LIGATION Bilateral        Past Family History  Family History   Problem Relation Age of Onset    Bipolar disorder Mother     Mental illness Mother         depression    Heart disease Father     Hypertension Father     Other Family         Back disorder    Diabetes Family     Heart disease Family     Hypertension Family     Breast cancer Family     Stroke Family     Thyroid disease Family     Substance Abuse Neg Hx         neg fam hx       Past Social history  Social History     Socioeconomic History    Marital status:      Spouse name: Not on file    Number of children: Not on file    Years of education: Not on file    Highest education level: Not on file   Occupational History    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   Social Needs    Financial resource strain: Not on file    Food insecurity:     Worry: Not on file     Inability: Not on file    Transportation needs:     Medical: Not on file     Non-medical: Not on file   Tobacco Use    Smoking status: Never Smoker    Smokeless tobacco: Never Used   Substance and Sexual Activity    Alcohol use: No    Drug use: No    Sexual activity: Never   Lifestyle    Physical activity:     Days per week: Not on file     Minutes per session: Not on file    Stress: Not on file   Relationships    Social connections:     Talks on phone: Not on file     Gets together: Not on file     Attends Denominational service: Not on file     Active member of club or organization: Not on file     Attends meetings of clubs or organizations: Not on file     Relationship status: Not on file    Intimate partner violence:     Fear of current or ex partner: Not on file     Emotionally abused: Not on file     Physically abused: Not on file     Forced sexual activity: Not on file   Other Topics Concern    Not on file   Social History Narrative    Daily caffeine consumption 2-3 servings a day    Lives with family  No living will  Has dentures---no dental care  Primary language--English       Feels safe at home        Current Medications  Current Outpatient Medications   Medication Sig Dispense Refill    acetaminophen (TYLENOL) 325 mg tablet Take 650 mg by mouth every 6 (six) hours as needed for mild pain      amLODIPine (NORVASC) 5 mg tablet Take 1 tablet (5 mg total) by mouth 2 (two) times a day 180 tablet 3    atorvastatin (LIPITOR) 40 mg tablet Take 0 5 tablets (20 mg total) by mouth daily 30 tablet 3    carvedilol (COREG) 3 125 mg tablet Take 1 tablet (3 125 mg total) by mouth 2 (two) times a day with meals 60 tablet 2    clonazePAM (KLONOPIN) 0 5 mg tablet Take 1 tablet (0 5 mg total) by mouth 3 (three) times a day 21 tablet 0    fluvoxaMINE (LUVOX) 100 mg tablet Take 100 mg by mouth 3 (three) times a day        ibuprofen (MOTRIN) 600 mg tablet Take 1 tablet (600 mg total) by mouth every 6 (six) hours as needed for moderate pain 30 tablet 0    lamoTRIgine (LaMICtal) 100 mg tablet Take 100 mg by mouth 3 (three) times a day Pt takes 3 times daily      omeprazole (PriLOSEC) 40 MG capsule Take 1 capsule (40 mg total) by mouth daily at bedtime 90 capsule 3    Polyethylene Glycol 3350 (MIRALAX PO) Take by mouth as needed       QUEtiapine (SEROQUEL) 300 mg tablet Take 1 tablet (300 mg total) by mouth every morning 1 in the AM     ( also takes 400 mg at bedtime) 90 tablet 3    QUEtiapine (SEROquel) 400 MG tablet Take 400 mg by mouth daily       No current facility-administered medications for this visit  Allergies  No Known Allergies    Past medical history, social history, family history, medications and allergies were reviewed  Vitals  There were no vitals filed for this visit      Physical Exam  Physical Exam    Results    I have personally reviewed all pertinent lab results and reviewed with patient  Lab Results   Component Value Date    CALCIUM 9 7 01/31/2019     02/27/2017    K 4 8 01/31/2019    CO2 27 01/31/2019     01/31/2019    BUN 38 (H) 01/31/2019    CREATININE 2 34 (H) 01/31/2019     Lab Results   Component Value Date    WBC 5 25 01/24/2019    HGB 13 5 01/24/2019    HCT 42 3 01/24/2019    MCV 98 01/24/2019     01/24/2019     No results found for this or any previous visit (from the past 1 hour(s))

## 2019-02-26 ENCOUNTER — TELEPHONE (OUTPATIENT)
Dept: GASTROENTEROLOGY | Facility: CLINIC | Age: 59
End: 2019-02-26

## 2019-02-26 DIAGNOSIS — K59.00 CONSTIPATION, UNSPECIFIED CONSTIPATION TYPE: Primary | ICD-10-CM

## 2019-02-26 NOTE — TELEPHONE ENCOUNTER
I called patient and left a voicemail  Linzess 145 mcg 1 daily number 30-1 refill is okay  I will attempt order    She needs an office visit please arrange thanks

## 2019-02-26 NOTE — TELEPHONE ENCOUNTER
States she is taking Miralax and Metamucil; straining to pass stool; had 1/2 her colon removed; asks if she should go on Linzess again?  # [phone]

## 2019-02-28 NOTE — TELEPHONE ENCOUNTER
Message left for patient ok to hold metamucil and miralax at this time but if linzess not effective to restart one/both and address further at [address] on 3/4/19

## 2019-02-28 NOTE — TELEPHONE ENCOUNTER
Pt states Dr Brenton Burk put her on med Linzess; previously was on Miralax and Metamucil; questions if she is supposed to discontinue those meds now that she is on Linzess?  Pt states she will stop taking Miralax & Metamucil at this time/asks for  [phone]

## 2019-03-04 ENCOUNTER — TELEPHONE (OUTPATIENT)
Dept: GASTROENTEROLOGY | Facility: CLINIC | Age: 59
End: 2019-03-04

## 2019-03-04 DIAGNOSIS — K59.00 CONSTIPATION, UNSPECIFIED CONSTIPATION TYPE: Primary | ICD-10-CM

## 2019-03-04 NOTE — TELEPHONE ENCOUNTER
Pt states she just talked w/ Dr Aleja Lane but hung up before she asked if she should still take Metamucil and Miralax?; asks for CB

## 2019-03-04 NOTE — TELEPHONE ENCOUNTER
Pt asks if Dr Bernabe Arevalo will increase her med? I asked if it was working and she said "a little"  Pt states she had appt today but canc'd due to weather; is on Linzess/questions if dosageshould be changed?/will run out before resched'd appt April 23  She also takes Miralax and Metamucil/states it tastes yucky  Pt also questions if new script could go through mail order which is cheaper?  [phone]

## 2019-03-04 NOTE — TELEPHONE ENCOUNTER
I reviewed the chart and left a voicemail for the patient on her mobile number  I think the next step between now and her April office visit is to increase the dose of Linzess will increase to 290 mcg daily  Will attempt to order  If it will not go through could you darryn it up and sent back to me?   Thanks

## 2019-03-04 NOTE — TELEPHONE ENCOUNTER
Pt was schedule for an office visit today at 9 am with you  Pt canceled due to the weather  She is unable to get another appt till April  She feels as though linzess isn't working, she  is still dealing with constipation   Please advise

## 2019-03-09 ENCOUNTER — APPOINTMENT (EMERGENCY)
Dept: CT IMAGING | Facility: HOSPITAL | Age: 59
End: 2019-03-09
Payer: COMMERCIAL

## 2019-03-09 ENCOUNTER — HOSPITAL ENCOUNTER (EMERGENCY)
Facility: HOSPITAL | Age: 59
Discharge: HOME | End: 2019-03-09
Attending: EMERGENCY MEDICINE | Admitting: EMERGENCY MEDICINE
Payer: COMMERCIAL

## 2019-03-09 VITALS
DIASTOLIC BLOOD PRESSURE: 84 MMHG | BODY MASS INDEX: 26.84 KG/M2 | OXYGEN SATURATION: 93 % | TEMPERATURE: 97.7 F | WEIGHT: 171 LBS | HEART RATE: 92 BPM | SYSTOLIC BLOOD PRESSURE: 144 MMHG | HEIGHT: 67 IN | RESPIRATION RATE: 25 BRPM

## 2019-03-09 DIAGNOSIS — S29.8XXA BLUNT CHEST TRAUMA: ICD-10-CM

## 2019-03-09 DIAGNOSIS — S80.211A ABRASION OF RIGHT KNEE: ICD-10-CM
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
## 2019-03-09 DIAGNOSIS — S80.02XA CONTUSION OF LEFT KNEE: ICD-10-CM

## 2019-03-09 LAB
ALBUMIN SERPL BCP-MCNC: 4 G/DL (ref 3.5–5)
ALP SERPL-CCNC: 198 U/L (ref 46–116)
ALT SERPL W P-5'-P-CCNC: 37 U/L (ref 12–78)
ANION GAP SERPL CALCULATED.3IONS-SCNC: 13 MMOL/L (ref 4–13)
AST SERPL W P-5'-P-CCNC: 33 U/L (ref 5–45)
BASOPHILS # BLD AUTO: 0.05 THOUSANDS/ΜL (ref 0–0.1)
BASOPHILS NFR BLD AUTO: 1 % (ref 0–1)
BILIRUB SERPL-MCNC: 0.4 MG/DL (ref 0.2–1)
BUN SERPL-MCNC: 41 MG/DL (ref 5–25)
CALCIUM SERPL-MCNC: 9.2 MG/DL (ref 8.3–10.1)
CHLORIDE SERPL-SCNC: 102 MMOL/L (ref 100–108)
CO2 SERPL-SCNC: 23 MMOL/L (ref 21–32)
CREAT SERPL-MCNC: 2.4 MG/DL (ref 0.6–1.3)
EOSINOPHIL # BLD AUTO: 0.1 THOUSAND/ΜL (ref 0–0.61)
EOSINOPHIL NFR BLD AUTO: 1 % (ref 0–6)
ERYTHROCYTE [DISTWIDTH] IN BLOOD BY AUTOMATED COUNT: 12.7 % (ref 11.6–15.1)
GFR SERPL CREATININE-BSD FRML MDRD: 22 ML/MIN/1.73SQ M
GLUCOSE SERPL-MCNC: 122 MG/DL (ref 65–140)
HCT VFR BLD AUTO: 38.1 % (ref 34.8–46.1)
HGB BLD-MCNC: 12.2 G/DL (ref 11.5–15.4)
IMM GRANULOCYTES # BLD AUTO: 0.03 THOUSAND/UL (ref 0–0.2)
IMM GRANULOCYTES NFR BLD AUTO: 0 % (ref 0–2)
LYMPHOCYTES # BLD AUTO: 0.97 THOUSANDS/ΜL (ref 0.6–4.47)
LYMPHOCYTES NFR BLD AUTO: 10 % (ref 14–44)
MCH RBC QN AUTO: 30.8 PG (ref 26.8–34.3)
MCHC RBC AUTO-ENTMCNC: 32 G/DL (ref 31.4–37.4)
MCV RBC AUTO: 96 FL (ref 82–98)
MONOCYTES # BLD AUTO: 0.63 THOUSAND/ΜL (ref 0.17–1.22)
MONOCYTES NFR BLD AUTO: 7 % (ref 4–12)
NEUTROPHILS # BLD AUTO: 7.51 THOUSANDS/ΜL (ref 1.85–7.62)
NEUTS SEG NFR BLD AUTO: 81 % (ref 43–75)
NRBC BLD AUTO-RTO: 0 /100 WBCS
PLATELET # BLD AUTO: 236 THOUSANDS/UL (ref 149–390)
PMV BLD AUTO: 9.3 FL (ref 8.9–12.7)
POTASSIUM SERPL-SCNC: 4.9 MMOL/L (ref 3.5–5.3)
PROT SERPL-MCNC: 7.3 G/DL (ref 6.4–8.2)
RBC # BLD AUTO: 3.96 MILLION/UL (ref 3.81–5.12)
SODIUM SERPL-SCNC: 138 MMOL/L (ref 136–145)
TROPONIN I SERPL-MCNC: <0.02 NG/ML
WBC # BLD AUTO: 9.29 THOUSAND/UL (ref 4.31–10.16)

## 2019-03-09 PROCEDURE — 36415 COLL VENOUS BLD VENIPUNCTURE: CPT | Performed by: PHYSICIAN ASSISTANT

## 2019-03-09 PROCEDURE — 93005 ELECTROCARDIOGRAM TRACING: CPT

## 2019-03-09 PROCEDURE — 99285 EMERGENCY DEPT VISIT HI MDM: CPT

## 2019-03-09 PROCEDURE — 71250 CT THORAX DX C-: CPT

## 2019-03-09 PROCEDURE — 80053 COMPREHEN METABOLIC PANEL: CPT | Performed by: PHYSICIAN ASSISTANT

## 2019-03-09 PROCEDURE — 85025 COMPLETE CBC W/AUTO DIFF WBC: CPT | Performed by: PHYSICIAN ASSISTANT

## 2019-03-09 PROCEDURE — 84484 ASSAY OF TROPONIN QUANT: CPT | Performed by: PHYSICIAN ASSISTANT

## 2019-03-09 RX ORDER — ACETAMINOPHEN 325 MG/1
650 TABLET ORAL ONCE
Status: COMPLETED | OUTPATIENT
Start: 2019-03-09 | End: 2019-03-09

## 2019-03-09 RX ORDER — METHOCARBAMOL 500 MG/1
500 TABLET, FILM COATED ORAL 3 TIMES DAILY
Qty: 15 TABLET | Refills: 0 | Status: SHIPPED | OUTPATIENT
Start: 2019-03-09 | End: 2019-03-19 | Stop reason: ALTCHOICE

## 2019-03-09 RX ADMIN — ACETAMINOPHEN 650 MG: 325 TABLET ORAL at 13:29
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
## 2019-03-09 NOTE — DISCHARGE INSTRUCTIONS
Rest, ice for next 2 days, heat after 2 days  Take robaxin and tylenol as needed for pain  Follow up with family doctor for recheck in 2 days  Increase fluids since your kidney functions were slightly elevated

## 2019-03-10 LAB
ATRIAL RATE: 90 BPM
P AXIS: 61 DEGREES
PR INTERVAL: 154 MS
QRS AXIS: 143 DEGREES
QRSD INTERVAL: 112 MS
QT INTERVAL: 390 MS
QTC INTERVAL: 477 MS
T WAVE AXIS: 72 DEGREES
VENTRICULAR RATE: 90 BPM

## 2019-03-10 PROCEDURE — 93010 ELECTROCARDIOGRAM REPORT: CPT | Performed by: INTERNAL MEDICINE

## 2019-03-11 ENCOUNTER — OFFICE VISIT (OUTPATIENT)
Dept: FAMILY MEDICINE CLINIC | Facility: HOSPITAL | Age: 59
End: 2019-03-11
Payer: COMMERCIAL

## 2019-03-11 VITALS
DIASTOLIC BLOOD PRESSURE: 78 MMHG | RESPIRATION RATE: 16 BRPM | HEART RATE: 72 BPM | HEIGHT: 67 IN | WEIGHT: 184 LBS | BODY MASS INDEX: 28.88 KG/M2 | SYSTOLIC BLOOD PRESSURE: 130 MMHG | TEMPERATURE: 98.7 F

## 2019-03-11 DIAGNOSIS — Z00.00 HEALTHCARE MAINTENANCE: ICD-10-CM

## 2019-03-11 DIAGNOSIS — Z87.898 HISTORY OF MARIJUANA USE: ICD-10-CM

## 2019-03-11 DIAGNOSIS — R93.89 ABNORMAL CHEST CT: ICD-10-CM

## 2019-03-11 DIAGNOSIS — M79.18 MUSCULOSKELETAL PAIN: Primary | ICD-10-CM

## 2019-03-11 PROCEDURE — 99213 OFFICE O/P EST LOW 20 MIN: CPT | Performed by: PHYSICIAN ASSISTANT

## 2019-03-11 RX ORDER — TRAMADOL HYDROCHLORIDE 50 MG/1
50 TABLET ORAL 3 TIMES DAILY PRN
Qty: 60 TABLET | Refills: 2 | Status: SHIPPED | OUTPATIENT
Start: 2019-03-11 | End: 2019-03-25 | Stop reason: CLARIF

## 2019-03-11 RX ORDER — AMILORIDE HYDROCHLORIDE 5 MG/1
TABLET ORAL
COMMUNITY
End: 2019-11-05

## 2019-03-11 NOTE — PATIENT INSTRUCTIONS
Reviewed records from ER  Referred to pulmonary due to abnormal CT of lungs  DO NOT RECOMMEND NSAID'S due to severe/chronic kidney dz  Will try Tramadol tid prn  RTO in 1-2 months for follow up  Does not want physical therapy at this time
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
## 2019-03-19 ENCOUNTER — APPOINTMENT (EMERGENCY)
Dept: NON INVASIVE DIAGNOSTICS | Facility: HOSPITAL | Age: 59
DRG: 315 | End: 2019-03-19
Payer: COMMERCIAL

## 2019-03-19 ENCOUNTER — APPOINTMENT (EMERGENCY)
Dept: RADIOLOGY | Facility: HOSPITAL | Age: 59
DRG: 315 | End: 2019-03-19
Payer: COMMERCIAL

## 2019-03-19 ENCOUNTER — HOSPITAL ENCOUNTER (INPATIENT)
Facility: HOSPITAL | Age: 59
LOS: 1 days | Discharge: HOME | DRG: 315 | End: 2019-03-20
Attending: EMERGENCY MEDICINE | Admitting: INTERNAL MEDICINE
Payer: COMMERCIAL

## 2019-03-19 ENCOUNTER — OFFICE VISIT (OUTPATIENT)
Dept: FAMILY MEDICINE CLINIC | Facility: HOSPITAL | Age: 59
End: 2019-03-19
Payer: COMMERCIAL

## 2019-03-19 ENCOUNTER — APPOINTMENT (EMERGENCY)
Dept: CT IMAGING | Facility: HOSPITAL | Age: 59
DRG: 315 | End: 2019-03-19
Payer: COMMERCIAL

## 2019-03-19 VITALS
WEIGHT: 187 LBS | HEART RATE: 88 BPM | BODY MASS INDEX: 29.35 KG/M2 | HEIGHT: 67 IN | DIASTOLIC BLOOD PRESSURE: 78 MMHG | RESPIRATION RATE: 16 BRPM | SYSTOLIC BLOOD PRESSURE: 118 MMHG

## 2019-03-19 DIAGNOSIS — I31.3 PERICARDIAL EFFUSION: Primary | ICD-10-CM

## 2019-03-19 DIAGNOSIS — M79.89 PAIN AND SWELLING OF LEFT LOWER LEG: ICD-10-CM

## 2019-03-19 DIAGNOSIS — M79.662 PAIN AND SWELLING OF LEFT LOWER LEG: ICD-10-CM

## 2019-03-19 DIAGNOSIS — N18.30 CHRONIC KIDNEY DISEASE, STAGE 3 (HCC): ICD-10-CM

## 2019-03-19 DIAGNOSIS — I10 ESSENTIAL HYPERTENSION: ICD-10-CM

## 2019-03-19 DIAGNOSIS — E21.3 HYPERPARATHYROIDISM (HCC): ICD-10-CM

## 2019-03-19 DIAGNOSIS — E22.1 HYPERPROLACTINEMIA (HCC): ICD-10-CM

## 2019-03-19 DIAGNOSIS — R90.89 ABNORMAL CT OF BRAIN: ICD-10-CM

## 2019-03-19 DIAGNOSIS — R07.9 CHEST PAIN, UNSPECIFIED: ICD-10-CM

## 2019-03-19 DIAGNOSIS — E23.2 DIABETES INSIPIDUS (HCC): ICD-10-CM

## 2019-03-19 DIAGNOSIS — I73.9 PAD (PERIPHERAL ARTERY DISEASE) (HCC): ICD-10-CM

## 2019-03-19 DIAGNOSIS — Z87.898 HISTORY OF SNORING: ICD-10-CM

## 2019-03-19 DIAGNOSIS — R93.89 ABNORMAL CT OF THE CHEST: ICD-10-CM

## 2019-03-19 DIAGNOSIS — S20.211D CONTUSION OF RIGHT CHEST WALL, SUBSEQUENT ENCOUNTER: Primary | ICD-10-CM

## 2019-03-19 DIAGNOSIS — N25.81 SECONDARY RENAL HYPERPARATHYROIDISM (HCC): ICD-10-CM

## 2019-03-19 PROBLEM — R06.02 SHORTNESS OF BREATH: Status: ACTIVE | Noted: 2019-03-19

## 2019-03-19 PROBLEM — I31.39 PERICARDIAL EFFUSION: Status: ACTIVE | Noted: 2019-03-19

## 2019-03-19 PROBLEM — R07.1 CHEST PAIN ON BREATHING: Status: ACTIVE | Noted: 2019-03-19

## 2019-03-19 LAB
ALBUMIN SERPL BCP-MCNC: 3.8 G/DL (ref 3.5–5)
ALP SERPL-CCNC: 213 U/L (ref 46–116)
ALT SERPL W P-5'-P-CCNC: 49 U/L (ref 12–78)
ANION GAP SERPL CALCULATED.3IONS-SCNC: 10 MMOL/L (ref 4–13)
APTT PPP: 32 SECONDS (ref 26–38)
AST SERPL W P-5'-P-CCNC: 42 U/L (ref 5–45)
BASOPHILS # BLD AUTO: 0.06 THOUSANDS/ΜL (ref 0–0.1)
BASOPHILS NFR BLD AUTO: 1 % (ref 0–1)
BILIRUB SERPL-MCNC: 0.3 MG/DL (ref 0.2–1)
BUN SERPL-MCNC: 35 MG/DL (ref 5–25)
CALCIUM SERPL-MCNC: 9.5 MG/DL (ref 8.3–10.1)
CHLORIDE SERPL-SCNC: 103 MMOL/L (ref 100–108)
CO2 SERPL-SCNC: 25 MMOL/L (ref 21–32)
CREAT SERPL-MCNC: 2.16 MG/DL (ref 0.6–1.3)
DEPRECATED D DIMER PPP: 2191 NG/ML (FEU)
EOSINOPHIL # BLD AUTO: 0.2 THOUSAND/ΜL (ref 0–0.61)
EOSINOPHIL NFR BLD AUTO: 4 % (ref 0–6)
ERYTHROCYTE [DISTWIDTH] IN BLOOD BY AUTOMATED COUNT: 12.7 % (ref 11.6–15.1)
GFR SERPL CREATININE-BSD FRML MDRD: 25 ML/MIN/1.73SQ M
GLUCOSE SERPL-MCNC: 92 MG/DL (ref 65–140)
HCT VFR BLD AUTO: 36 % (ref 34.8–46.1)
HGB BLD-MCNC: 11.8 G/DL (ref 11.5–15.4)
IMM GRANULOCYTES # BLD AUTO: 0.01 THOUSAND/UL (ref 0–0.2)
IMM GRANULOCYTES NFR BLD AUTO: 0 % (ref 0–2)
INR PPP: 1.03 (ref 0.86–1.17)
LYMPHOCYTES # BLD AUTO: 1.42 THOUSANDS/ΜL (ref 0.6–4.47)
LYMPHOCYTES NFR BLD AUTO: 29 % (ref 14–44)
MCH RBC QN AUTO: 31.5 PG (ref 26.8–34.3)
MCHC RBC AUTO-ENTMCNC: 32.8 G/DL (ref 31.4–37.4)
MCV RBC AUTO: 96 FL (ref 82–98)
MONOCYTES # BLD AUTO: 0.47 THOUSAND/ΜL (ref 0.17–1.22)
MONOCYTES NFR BLD AUTO: 10 % (ref 4–12)
NEUTROPHILS # BLD AUTO: 2.75 THOUSANDS/ΜL (ref 1.85–7.62)
NEUTS SEG NFR BLD AUTO: 56 % (ref 43–75)
NRBC BLD AUTO-RTO: 0 /100 WBCS
NT-PROBNP SERPL-MCNC: 214 PG/ML
PLATELET # BLD AUTO: 268 THOUSANDS/UL (ref 149–390)
PMV BLD AUTO: 9.9 FL (ref 8.9–12.7)
POTASSIUM SERPL-SCNC: 5.4 MMOL/L (ref 3.5–5.3)
PROT SERPL-MCNC: 6.9 G/DL (ref 6.4–8.2)
PROTHROMBIN TIME: 12.9 SECONDS (ref 11.8–14.2)
RBC # BLD AUTO: 3.75 MILLION/UL (ref 3.81–5.12)
SODIUM SERPL-SCNC: 138 MMOL/L (ref 136–145)
TROPONIN I SERPL-MCNC: <0.02 NG/ML
WBC # BLD AUTO: 4.91 THOUSAND/UL (ref 4.31–10.16)

## 2019-03-19 PROCEDURE — 85379 FIBRIN DEGRADATION QUANT: CPT | Performed by: EMERGENCY MEDICINE

## 2019-03-19 PROCEDURE — 99285 EMERGENCY DEPT VISIT HI MDM: CPT

## 2019-03-19 PROCEDURE — 71046 X-RAY EXAM CHEST 2 VIEWS: CPT

## 2019-03-19 PROCEDURE — 96374 THER/PROPH/DIAG INJ IV PUSH: CPT

## 2019-03-19 PROCEDURE — 93971 EXTREMITY STUDY: CPT

## 2019-03-19 PROCEDURE — 80053 COMPREHEN METABOLIC PANEL: CPT | Performed by: EMERGENCY MEDICINE

## 2019-03-19 PROCEDURE — 96376 TX/PRO/DX INJ SAME DRUG ADON: CPT

## 2019-03-19 PROCEDURE — 84484 ASSAY OF TROPONIN QUANT: CPT | Performed by: EMERGENCY MEDICINE

## 2019-03-19 PROCEDURE — 93005 ELECTROCARDIOGRAM TRACING: CPT

## 2019-03-19 PROCEDURE — 85025 COMPLETE CBC W/AUTO DIFF WBC: CPT | Performed by: EMERGENCY MEDICINE

## 2019-03-19 PROCEDURE — 93971 EXTREMITY STUDY: CPT | Performed by: INTERNAL MEDICINE

## 2019-03-19 PROCEDURE — 71250 CT THORAX DX C-: CPT

## 2019-03-19 PROCEDURE — 83880 ASSAY OF NATRIURETIC PEPTIDE: CPT | Performed by: EMERGENCY MEDICINE

## 2019-03-19 PROCEDURE — 36415 COLL VENOUS BLD VENIPUNCTURE: CPT | Performed by: EMERGENCY MEDICINE

## 2019-03-19 PROCEDURE — 85610 PROTHROMBIN TIME: CPT | Performed by: EMERGENCY MEDICINE

## 2019-03-19 PROCEDURE — 99214 OFFICE O/P EST MOD 30 MIN: CPT | Performed by: INTERNAL MEDICINE

## 2019-03-19 PROCEDURE — 85730 THROMBOPLASTIN TIME PARTIAL: CPT | Performed by: EMERGENCY MEDICINE

## 2019-03-19 RX ORDER — FENTANYL CITRATE 50 UG/ML
50 INJECTION, SOLUTION INTRAMUSCULAR; INTRAVENOUS ONCE
Status: COMPLETED | OUTPATIENT
Start: 2019-03-19 | End: 2019-03-19

## 2019-03-19 RX ADMIN — FENTANYL CITRATE 50 MCG: 50 INJECTION, SOLUTION INTRAMUSCULAR; INTRAVENOUS at 19:35

## 2019-03-19 RX ADMIN — FENTANYL CITRATE 50 MCG: 50 INJECTION, SOLUTION INTRAMUSCULAR; INTRAVENOUS at 22:28

## 2019-03-19 RX ADMIN — SODIUM CHLORIDE 500 ML: 0.9 INJECTION, SOLUTION INTRAVENOUS at 23:35
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
## 2019-03-20 ENCOUNTER — TELEPHONE (OUTPATIENT)
Dept: NEPHROLOGY | Facility: HOSPITAL | Age: 59
End: 2019-03-20

## 2019-03-20 ENCOUNTER — APPOINTMENT (INPATIENT)
Dept: NON INVASIVE DIAGNOSTICS | Facility: HOSPITAL | Age: 59
DRG: 315 | End: 2019-03-20
Payer: COMMERCIAL

## 2019-03-20 VITALS
HEART RATE: 82 BPM | HEIGHT: 67 IN | BODY MASS INDEX: 28.41 KG/M2 | RESPIRATION RATE: 20 BRPM | WEIGHT: 181 LBS | DIASTOLIC BLOOD PRESSURE: 68 MMHG | TEMPERATURE: 98 F | SYSTOLIC BLOOD PRESSURE: 111 MMHG | OXYGEN SATURATION: 92 %

## 2019-03-20 LAB
ANION GAP SERPL CALCULATED.3IONS-SCNC: 7 MMOL/L (ref 4–13)
ATRIAL RATE: 77 BPM
BASOPHILS # BLD AUTO: 0.05 THOUSANDS/ΜL (ref 0–0.1)
BASOPHILS NFR BLD AUTO: 1 % (ref 0–1)
BUN SERPL-MCNC: 30 MG/DL (ref 5–25)
CALCIUM SERPL-MCNC: 9.4 MG/DL (ref 8.3–10.1)
CHLORIDE SERPL-SCNC: 108 MMOL/L (ref 100–108)
CO2 SERPL-SCNC: 28 MMOL/L (ref 21–32)
CREAT SERPL-MCNC: 2.05 MG/DL (ref 0.6–1.3)
EOSINOPHIL # BLD AUTO: 0.18 THOUSAND/ΜL (ref 0–0.61)
EOSINOPHIL NFR BLD AUTO: 5 % (ref 0–6)
ERYTHROCYTE [DISTWIDTH] IN BLOOD BY AUTOMATED COUNT: 12.8 % (ref 11.6–15.1)
GFR SERPL CREATININE-BSD FRML MDRD: 27 ML/MIN/1.73SQ M
GLUCOSE SERPL-MCNC: 126 MG/DL (ref 65–140)
HCT VFR BLD AUTO: 36.1 % (ref 34.8–46.1)
HGB BLD-MCNC: 11.5 G/DL (ref 11.5–15.4)
IMM GRANULOCYTES # BLD AUTO: 0.01 THOUSAND/UL (ref 0–0.2)
IMM GRANULOCYTES NFR BLD AUTO: 0 % (ref 0–2)
INR PPP: 1.09 (ref 0.86–1.17)
LYMPHOCYTES # BLD AUTO: 1.27 THOUSANDS/ΜL (ref 0.6–4.47)
LYMPHOCYTES NFR BLD AUTO: 32 % (ref 14–44)
MAGNESIUM SERPL-MCNC: 2.4 MG/DL (ref 1.6–2.6)
MCH RBC QN AUTO: 30.9 PG (ref 26.8–34.3)
MCHC RBC AUTO-ENTMCNC: 31.9 G/DL (ref 31.4–37.4)
MCV RBC AUTO: 97 FL (ref 82–98)
MONOCYTES # BLD AUTO: 0.45 THOUSAND/ΜL (ref 0.17–1.22)
MONOCYTES NFR BLD AUTO: 11 % (ref 4–12)
NEUTROPHILS # BLD AUTO: 2.05 THOUSANDS/ΜL (ref 1.85–7.62)
NEUTS SEG NFR BLD AUTO: 51 % (ref 43–75)
NRBC BLD AUTO-RTO: 0 /100 WBCS
P AXIS: 72 DEGREES
PLATELET # BLD AUTO: 246 THOUSANDS/UL (ref 149–390)
PMV BLD AUTO: 9.8 FL (ref 8.9–12.7)
POTASSIUM SERPL-SCNC: 4.6 MMOL/L (ref 3.5–5.3)
PR INTERVAL: 144 MS
PROTHROMBIN TIME: 13.5 SECONDS (ref 11.8–14.2)
QRS AXIS: 92 DEGREES
QRSD INTERVAL: 110 MS
QT INTERVAL: 416 MS
QTC INTERVAL: 470 MS
RBC # BLD AUTO: 3.72 MILLION/UL (ref 3.81–5.12)
SODIUM SERPL-SCNC: 143 MMOL/L (ref 136–145)
T WAVE AXIS: 85 DEGREES
TROPONIN I SERPL-MCNC: 0.02 NG/ML
TROPONIN I SERPL-MCNC: <0.02 NG/ML
TROPONIN I SERPL-MCNC: <0.02 NG/ML
VENTRICULAR RATE: 77 BPM
WBC # BLD AUTO: 4.01 THOUSAND/UL (ref 4.31–10.16)

## 2019-03-20 PROCEDURE — 99222 1ST HOSP IP/OBS MODERATE 55: CPT | Performed by: INTERNAL MEDICINE

## 2019-03-20 PROCEDURE — 36415 COLL VENOUS BLD VENIPUNCTURE: CPT | Performed by: EMERGENCY MEDICINE

## 2019-03-20 PROCEDURE — 83735 ASSAY OF MAGNESIUM: CPT | Performed by: PHYSICIAN ASSISTANT

## 2019-03-20 PROCEDURE — 85610 PROTHROMBIN TIME: CPT | Performed by: INTERNAL MEDICINE

## 2019-03-20 PROCEDURE — 84484 ASSAY OF TROPONIN QUANT: CPT | Performed by: EMERGENCY MEDICINE

## 2019-03-20 PROCEDURE — 93306 TTE W/DOPPLER COMPLETE: CPT

## 2019-03-20 PROCEDURE — 84484 ASSAY OF TROPONIN QUANT: CPT | Performed by: INTERNAL MEDICINE

## 2019-03-20 PROCEDURE — 80048 BASIC METABOLIC PNL TOTAL CA: CPT | Performed by: PHYSICIAN ASSISTANT

## 2019-03-20 PROCEDURE — 93306 TTE W/DOPPLER COMPLETE: CPT | Performed by: INTERNAL MEDICINE

## 2019-03-20 PROCEDURE — 99235 HOSP IP/OBS SAME DATE MOD 70: CPT | Performed by: HOSPITALIST

## 2019-03-20 PROCEDURE — 93010 ELECTROCARDIOGRAM REPORT: CPT | Performed by: INTERNAL MEDICINE

## 2019-03-20 PROCEDURE — 85025 COMPLETE CBC W/AUTO DIFF WBC: CPT | Performed by: PHYSICIAN ASSISTANT

## 2019-03-20 RX ORDER — PREDNISONE 20 MG/1
20 TABLET ORAL DAILY
Status: DISCONTINUED | OUTPATIENT
Start: 2019-03-20 | End: 2019-03-20 | Stop reason: HOSPADM

## 2019-03-20 RX ORDER — POLYETHYLENE GLYCOL 3350 17 G/17G
17 POWDER, FOR SOLUTION ORAL DAILY PRN
Status: DISCONTINUED | OUTPATIENT
Start: 2019-03-20 | End: 2019-03-20 | Stop reason: HOSPADM

## 2019-03-20 RX ORDER — CARVEDILOL 3.12 MG/1
3.12 TABLET ORAL 2 TIMES DAILY WITH MEALS
Status: DISCONTINUED | OUTPATIENT
Start: 2019-03-20 | End: 2019-03-20 | Stop reason: HOSPADM

## 2019-03-20 RX ORDER — QUETIAPINE FUMARATE 300 MG/1
300 TABLET, FILM COATED ORAL EVERY MORNING
Status: DISCONTINUED | OUTPATIENT
Start: 2019-03-20 | End: 2019-03-20 | Stop reason: HOSPADM

## 2019-03-20 RX ORDER — ATORVASTATIN CALCIUM 20 MG/1
20 TABLET, FILM COATED ORAL DAILY
Status: DISCONTINUED | OUTPATIENT
Start: 2019-03-20 | End: 2019-03-20 | Stop reason: HOSPADM

## 2019-03-20 RX ORDER — CLONAZEPAM 0.5 MG/1
0.5 TABLET ORAL 3 TIMES DAILY
Status: DISCONTINUED | OUTPATIENT
Start: 2019-03-20 | End: 2019-03-20 | Stop reason: HOSPADM

## 2019-03-20 RX ORDER — ONDANSETRON 2 MG/ML
4 INJECTION INTRAMUSCULAR; INTRAVENOUS EVERY 6 HOURS PRN
Status: DISCONTINUED | OUTPATIENT
Start: 2019-03-20 | End: 2019-03-20 | Stop reason: HOSPADM

## 2019-03-20 RX ORDER — QUETIAPINE FUMARATE 200 MG/1
400 TABLET, FILM COATED ORAL
Status: DISCONTINUED | OUTPATIENT
Start: 2019-03-20 | End: 2019-03-20 | Stop reason: HOSPADM

## 2019-03-20 RX ORDER — TRAMADOL HYDROCHLORIDE 50 MG/1
50 TABLET ORAL 3 TIMES DAILY PRN
Status: DISCONTINUED | OUTPATIENT
Start: 2019-03-20 | End: 2019-03-20 | Stop reason: HOSPADM

## 2019-03-20 RX ORDER — PANTOPRAZOLE SODIUM 40 MG/1
40 TABLET, DELAYED RELEASE ORAL
Status: DISCONTINUED | OUTPATIENT
Start: 2019-03-20 | End: 2019-03-20 | Stop reason: HOSPADM

## 2019-03-20 RX ORDER — LAMOTRIGINE 100 MG/1
100 TABLET ORAL 3 TIMES DAILY
Status: DISCONTINUED | OUTPATIENT
Start: 2019-03-20 | End: 2019-03-20 | Stop reason: HOSPADM

## 2019-03-20 RX ORDER — PREDNISONE 20 MG/1
20 TABLET ORAL DAILY
Qty: 21 TABLET | Refills: 0 | Status: SHIPPED | OUTPATIENT
Start: 2019-03-20 | End: 2019-04-18 | Stop reason: ALTCHOICE

## 2019-03-20 RX ORDER — ACETAMINOPHEN 325 MG/1
650 TABLET ORAL EVERY 6 HOURS PRN
Status: DISCONTINUED | OUTPATIENT
Start: 2019-03-20 | End: 2019-03-20 | Stop reason: HOSPADM

## 2019-03-20 RX ORDER — FLUVOXAMINE MALEATE 50 MG/1
100 TABLET, COATED ORAL 3 TIMES DAILY
Status: DISCONTINUED | OUTPATIENT
Start: 2019-03-20 | End: 2019-03-20 | Stop reason: HOSPADM

## 2019-03-20 RX ADMIN — CLONAZEPAM 0.5 MG: 0.5 TABLET ORAL at 08:13

## 2019-03-20 RX ADMIN — LAMOTRIGINE 100 MG: 100 TABLET ORAL at 08:13

## 2019-03-20 RX ADMIN — PANTOPRAZOLE SODIUM 40 MG: 40 TABLET, DELAYED RELEASE ORAL at 06:24

## 2019-03-20 RX ADMIN — PREDNISONE 20 MG: 20 TABLET ORAL at 13:47

## 2019-03-20 RX ADMIN — FLUVOXAMINE MALEATE 100 MG: 50 TABLET, FILM COATED ORAL at 08:13

## 2019-03-20 RX ADMIN — CARVEDILOL 3.12 MG: 3.12 TABLET, FILM COATED ORAL at 08:13

## 2019-03-20 RX ADMIN — ATORVASTATIN CALCIUM 20 MG: 20 TABLET, FILM COATED ORAL at 08:13

## 2019-03-20 RX ADMIN — ACETAMINOPHEN 650 MG: 325 TABLET ORAL at 10:53

## 2019-03-20 RX ADMIN — TRAMADOL HYDROCHLORIDE 50 MG: 50 TABLET, COATED ORAL at 01:22

## 2019-03-20 RX ADMIN — QUETIAPINE FUMARATE 300 MG: 300 TABLET ORAL at 08:13

## 2019-03-20 RX ADMIN — TRAMADOL HYDROCHLORIDE 50 MG: 50 TABLET, COATED ORAL at 10:53

## 2019-03-20 RX ADMIN — QUETIAPINE FUMARATE 400 MG: 200 TABLET ORAL at 01:22
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
## 2019-03-20 NOTE — PLAN OF CARE
Problem: Potential for Falls  Goal: Patient will remain free of falls  Description  INTERVENTIONS:  - Assess patient frequently for physical needs  -  Identify cognitive and physical deficits and behaviors that affect risk of falls    -  [place] fall precautions as indicated by assessment   - Educate patient/family on patient safety including physical limitations  - Instruct patient to call for assistance with activity based on assessment  - Modify environment to reduce risk of injury  - Consider OT/PT consult to assist with strengthening/mobility  Outcome: Progressing     Problem: PAIN - ADULT  Goal: Verbalizes/displays adequate comfort level or baseline comfort level  Description  Interventions:  - Encourage patient to monitor pain and request assistance  - Assess pain using appropriate pain scale  - Administer analgesics based on type and severity of pain and evaluate response  - Implement non-pharmacological measures as appropriate and evaluate response  - Consider cultural and social influences on pain and pain management  - Notify physician/advanced practitioner if interventions unsuccessful or patient reports new pain  Outcome: Progressing     Problem: SAFETY ADULT  Goal: Maintain or return to baseline ADL function  Description  INTERVENTIONS:  -  Assess patient's ability to carry out ADLs; assess patient's baseline for ADL function and identify physical deficits which impact ability to perform ADLs (bathing, care of mouth/teeth, toileting, grooming, dressing, etc )  - Assess/evaluate cause of self-care deficits   - Assess range of motion  - Assess patient's mobility; develop plan if impaired  - Assess patient's need for assistive devices and provide as appropriate  - Encourage maximum independence but intervene and supervise when necessary  ¯ Involve family in performance of ADLs  ¯ Assess for home care needs following discharge   ¯ Request OT consult to assist with ADL evaluation and planning for discharge  ¯ Provide patient education as appropriate  Outcome: Progressing  Goal: Maintain or return mobility status to optimal level  Description  INTERVENTIONS:  - Assess patient's baseline mobility status (ambulation, transfers, stairs, etc )    - Identify cognitive and physical deficits and behaviors that affect mobility  - Identify mobility aids required to assist with transfers and/or ambulation (gait belt, sit-to-stand, lift, walker, cane, etc )  - [place] fall precautions as indicated by assessment  - Record patient progress and toleration of activity level on Mobility SBAR; progress patient to next Phase/Stage  - Instruct patient to call for assistance with activity based on assessment  - Request Rehabilitation consult to assist with strengthening/weightbearing, etc   Outcome: Progressing     Problem: DISCHARGE PLANNING  Goal: Discharge to home or other facility with appropriate resources  Description  INTERVENTIONS:  - Identify barriers to discharge w/patient and caregiver  - Arrange for needed discharge resources and transportation as appropriate  - Identify discharge learning needs (meds, wound care, etc )  - Arrange for interpretive services to assist at discharge as needed  - Refer to Case Management Department for coordinating discharge planning if the patient needs post-hospital services based on physician/advanced practitioner order or complex needs related to functional status, cognitive ability, or social support system  Outcome: Progressing     Problem: DISCHARGE PLANNING - CARE MANAGEMENT  Goal: Discharge to post-acute care or home with appropriate resources  Description  INTERVENTIONS:  - Conduct assessment to determine patient/family and health care team treatment goals, and need for post-acute services based on payer coverage, community resources, and patient preferences, and barriers to discharge  - Address psychosocial, clinical, and financial barriers to discharge as identified in assessment in conjunction with the patient/family and health care team  - Arrange appropriate level of post-acute services according to patient's   needs and preference and payer coverage in collaboration with the physician and health care team  - Communicate with and update the patient/family, physician, and health care team regarding progress on the discharge plan  - Arrange appropriate transportation to post-acute venues   Outcome: Progressing
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
## 2019-03-20 NOTE — SOCIAL WORK
Met with raul  Explained role of care management  Patient lives in a three story home with her sister, her sister children and grandchildren  No steps to enter  Her bedroom is in a third story loft - she is able to negotiate steps  She is independent adl's and ambulation, drives, is on SS disability  DME - denies  Past services - Friends and [address]  She sees her psychiatrist Dr Winter Filter monthly and her therapist Marylen Estelle every two weeks  She plans on returning home at discharge an does not anticipate any discharge needs  Will follow
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
## 2019-03-20 NOTE — ASSESSMENT & PLAN NOTE
Shortness of breath is probably direct related to the pericarditis/ small pericardial effusion  Patient has on CT scan bilateral atelectasis which is unchanged from previous CTs  Wean nasal cannula oxygen to off
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
## 2019-03-20 NOTE — ED NOTES
Patient taken off 2L of oxygen, patient tolerating RA, saturation above 95%      Mickie Anna RN  03/20/19 7137

## 2019-03-20 NOTE — PROGRESS NOTES
Pt admitted to room 201-A from ED; Tramadol effective for c/o pain and pt observed to be sleeping soundly during each hrly round from approx 0300 until present  Serial troponins completed

## 2019-03-21 ENCOUNTER — TELEPHONE (OUTPATIENT)
Dept: FAMILY MEDICINE CLINIC | Facility: HOSPITAL | Age: 59
End: 2019-03-21

## 2019-03-21 DIAGNOSIS — I31.3 PERICARDIAL EFFUSION: Primary | ICD-10-CM

## 2019-03-21 RX ORDER — HYDROCODONE BITARTRATE AND ACETAMINOPHEN 5; 325 MG/1; MG/1
1 TABLET ORAL EVERY 6 HOURS PRN
Qty: 20 TABLET | Refills: 0 | Status: SHIPPED | OUTPATIENT
Start: 2019-03-21 | End: 2019-04-02 | Stop reason: SDUPTHER

## 2019-03-21 NOTE — TELEPHONE ENCOUNTER
I have sent in for 5 days of hydrocodone 1 4 x day for chest pain associated with pericardial effusion

## 2019-03-22 ENCOUNTER — TRANSITIONAL CARE MANAGEMENT (OUTPATIENT)
Dept: FAMILY MEDICINE CLINIC | Facility: HOSPITAL | Age: 59
End: 2019-03-22

## 2019-03-29 ENCOUNTER — APPOINTMENT (OUTPATIENT)
Dept: LAB | Facility: HOSPITAL | Age: 59
End: 2019-03-29
Payer: MEDICARE

## 2019-03-29 DIAGNOSIS — Z00.00 HEALTHCARE MAINTENANCE: ICD-10-CM

## 2019-03-29 LAB — HCV AB SER QL: NORMAL

## 2019-03-29 PROCEDURE — 36415 COLL VENOUS BLD VENIPUNCTURE: CPT

## 2019-03-29 PROCEDURE — 86803 HEPATITIS C AB TEST: CPT

## 2019-04-02 ENCOUNTER — OFFICE VISIT (OUTPATIENT)
Dept: FAMILY MEDICINE CLINIC | Facility: HOSPITAL | Age: 59
End: 2019-04-02
Payer: MEDICARE

## 2019-04-02 VITALS
SYSTOLIC BLOOD PRESSURE: 110 MMHG | BODY MASS INDEX: 29.82 KG/M2 | HEART RATE: 68 BPM | DIASTOLIC BLOOD PRESSURE: 74 MMHG | HEIGHT: 67 IN | WEIGHT: 190 LBS

## 2019-04-02 DIAGNOSIS — N18.30 CHRONIC KIDNEY DISEASE, STAGE 3 (HCC): ICD-10-CM

## 2019-04-02 DIAGNOSIS — F31.81 BIPOLAR 2 DISORDER (HCC): Chronic | ICD-10-CM

## 2019-04-02 DIAGNOSIS — N25.81 SECONDARY RENAL HYPERPARATHYROIDISM (HCC): ICD-10-CM

## 2019-04-02 DIAGNOSIS — R06.02 SHORTNESS OF BREATH: Primary | ICD-10-CM

## 2019-04-02 DIAGNOSIS — F31.4 BIPOLAR 1 DISORDER, DEPRESSED, SEVERE (HCC): ICD-10-CM

## 2019-04-02 DIAGNOSIS — I31.3 PERICARDIAL EFFUSION: ICD-10-CM
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
## 2019-04-02 DIAGNOSIS — E22.1 HYPERPROLACTINEMIA (HCC): ICD-10-CM

## 2019-04-02 DIAGNOSIS — E21.3 HYPERPARATHYROIDISM (HCC): ICD-10-CM

## 2019-04-02 DIAGNOSIS — E78.00 HYPERCHOLESTEREMIA: Chronic | ICD-10-CM

## 2019-04-02 DIAGNOSIS — I73.9 PAD (PERIPHERAL ARTERY DISEASE) (HCC): ICD-10-CM

## 2019-04-02 DIAGNOSIS — E23.2 DIABETES INSIPIDUS (HCC): ICD-10-CM

## 2019-04-02 PROBLEM — R07.1 CHEST PAIN ON BREATHING: Status: RESOLVED | Noted: 2019-03-19 | Resolved: 2019-04-02

## 2019-04-02 PROBLEM — K59.03 DRUG-INDUCED CONSTIPATION: Status: ACTIVE | Noted: 2019-04-02

## 2019-04-02 PROBLEM — D12.6 BENIGN NEOPLASM OF COLON: Status: ACTIVE | Noted: 2019-04-02

## 2019-04-02 PROCEDURE — 99495 TRANSJ CARE MGMT MOD F2F 14D: CPT | Performed by: INTERNAL MEDICINE

## 2019-04-02 RX ORDER — TRAMADOL HYDROCHLORIDE 50 MG/1
TABLET ORAL
Refills: 2 | COMMUNITY
Start: 2019-03-28 | End: 2019-04-02 | Stop reason: ALTCHOICE

## 2019-04-02 RX ORDER — CLONAZEPAM 0.5 MG/1
0.5 TABLET ORAL 3 TIMES DAILY
Qty: 270 TABLET | Refills: 0 | Status: CANCELLED | OUTPATIENT
Start: 2019-04-02 | End: 2019-07-01

## 2019-04-02 RX ORDER — HYDROCODONE BITARTRATE AND ACETAMINOPHEN 5; 325 MG/1; MG/1
1 TABLET ORAL EVERY 6 HOURS PRN
Qty: 20 TABLET | Refills: 0 | Status: SHIPPED | OUTPATIENT
Start: 2019-04-02 | End: 2019-04-18 | Stop reason: ALTCHOICE

## 2019-04-02 RX ORDER — CLONAZEPAM 0.5 MG/1
0.5 TABLET ORAL 3 TIMES DAILY
Qty: 270 TABLET | Refills: 0 | Status: SHIPPED | OUTPATIENT
Start: 2019-04-02 | End: 2019-04-03 | Stop reason: SDUPTHER

## 2019-04-02 RX ORDER — PREDNISONE 10 MG/1
TABLET ORAL
Qty: 15 TABLET | Refills: 0 | Status: SHIPPED | OUTPATIENT
Start: 2019-04-02 | End: 2019-05-01 | Stop reason: ALTCHOICE

## 2019-04-02 RX ORDER — CLONAZEPAM 0.5 MG/1
0.5 TABLET ORAL 3 TIMES DAILY
Qty: 270 TABLET | Refills: 0 | Status: SHIPPED | OUTPATIENT
Start: 2019-04-02 | End: 2019-04-02 | Stop reason: SDUPTHER

## 2019-04-03 DIAGNOSIS — F31.81 BIPOLAR 2 DISORDER (HCC): Chronic | ICD-10-CM

## 2019-04-03 RX ORDER — CLONAZEPAM 0.5 MG/1
0.5 TABLET ORAL 3 TIMES DAILY
Qty: 270 TABLET | Refills: 0 | Status: CANCELLED | OUTPATIENT
Start: 2019-04-03 | End: 2019-07-02

## 2019-04-03 RX ORDER — CLONAZEPAM 0.5 MG/1
0.5 TABLET ORAL 3 TIMES DAILY
Qty: 270 TABLET | Refills: 0 | Status: SHIPPED | OUTPATIENT
Start: 2019-04-03 | End: 2019-06-13 | Stop reason: SDUPTHER

## 2019-04-04 ENCOUNTER — HOSPITAL ENCOUNTER (OUTPATIENT)
Dept: MRI IMAGING | Facility: HOSPITAL | Age: 59
Discharge: HOME | End: 2019-04-04
Attending: INTERNAL MEDICINE
Payer: COMMERCIAL

## 2019-04-04 DIAGNOSIS — R90.89 ABNORMAL CT OF BRAIN: ICD-10-CM

## 2019-04-04 PROCEDURE — 70551 MRI BRAIN STEM W/O DYE: CPT

## 2019-04-08 ENCOUNTER — TELEPHONE (OUTPATIENT)
Dept: FAMILY MEDICINE CLINIC | Facility: HOSPITAL | Age: 59
End: 2019-04-08

## 2019-04-18 ENCOUNTER — TELEPHONE (OUTPATIENT)
Dept: CARDIOLOGY CLINIC | Facility: CLINIC | Age: 59
End: 2019-04-18

## 2019-04-18 ENCOUNTER — OFFICE VISIT (OUTPATIENT)
Dept: CARDIOLOGY CLINIC | Facility: CLINIC | Age: 59
End: 2019-04-18
Payer: COMMERCIAL

## 2019-04-18 VITALS
WEIGHT: 190 LBS | DIASTOLIC BLOOD PRESSURE: 74 MMHG | HEART RATE: 84 BPM | SYSTOLIC BLOOD PRESSURE: 112 MMHG | BODY MASS INDEX: 29.82 KG/M2 | HEIGHT: 67 IN

## 2019-04-18 DIAGNOSIS — I31.9 PERICARDITIS, UNSPECIFIED CHRONICITY, UNSPECIFIED TYPE: Primary | ICD-10-CM

## 2019-04-18 PROCEDURE — 99214 OFFICE O/P EST MOD 30 MIN: CPT | Performed by: INTERNAL MEDICINE

## 2019-04-18 RX ORDER — PREDNISONE 1 MG/1
5 TABLET ORAL DAILY
Qty: 14 TABLET | Refills: 0 | Status: SHIPPED | OUTPATIENT
Start: 2019-04-18 | End: 2019-06-10

## 2019-04-19 ENCOUNTER — APPOINTMENT (OUTPATIENT)
Dept: LAB | Facility: HOSPITAL | Age: 59
End: 2019-04-19
Attending: INTERNAL MEDICINE
Payer: MEDICARE

## 2019-04-19 ENCOUNTER — TRANSCRIBE ORDERS (OUTPATIENT)
Dept: ADMINISTRATIVE | Facility: HOSPITAL | Age: 59
End: 2019-04-19

## 2019-04-19 DIAGNOSIS — K59.03 DRUG-INDUCED CONSTIPATION: Primary | ICD-10-CM

## 2019-04-19 DIAGNOSIS — K59.03 DRUG-INDUCED CONSTIPATION: ICD-10-CM

## 2019-04-19 DIAGNOSIS — N28.1 RENAL CYST: ICD-10-CM

## 2019-04-19 DIAGNOSIS — I10 ESSENTIAL HYPERTENSION: ICD-10-CM

## 2019-04-19 DIAGNOSIS — N18.30 CHRONIC KIDNEY DISEASE, STAGE 3 (HCC): ICD-10-CM

## 2019-04-19 DIAGNOSIS — N25.81 SECONDARY RENAL HYPERPARATHYROIDISM (HCC): ICD-10-CM

## 2019-04-19 DIAGNOSIS — E23.2 DIABETES INSIPIDUS (HCC): ICD-10-CM

## 2019-04-19 DIAGNOSIS — E55.9 VITAMIN D DEFICIENCY: ICD-10-CM

## 2019-04-19 LAB
25(OH)D3 SERPL-MCNC: 32.6 NG/ML (ref 30–100)
ALBUMIN SERPL BCP-MCNC: 3.6 G/DL (ref 3.5–5)
ALP SERPL-CCNC: 175 U/L (ref 46–116)
ALT SERPL W P-5'-P-CCNC: 42 U/L (ref 12–78)
ANION GAP SERPL CALCULATED.3IONS-SCNC: 12 MMOL/L (ref 4–13)
AST SERPL W P-5'-P-CCNC: 21 U/L (ref 5–45)
BACTERIA UR QL AUTO: ABNORMAL /HPF
BILIRUB SERPL-MCNC: 0.3 MG/DL (ref 0.2–1)
BILIRUB UR QL STRIP: NEGATIVE
BUN SERPL-MCNC: 38 MG/DL (ref 5–25)
CALCIUM SERPL-MCNC: 9.4 MG/DL (ref 8.3–10.1)
CHLORIDE SERPL-SCNC: 108 MMOL/L (ref 100–108)
CLARITY UR: CLEAR
CO2 SERPL-SCNC: 24 MMOL/L (ref 21–32)
COLOR UR: YELLOW
CREAT SERPL-MCNC: 1.94 MG/DL (ref 0.6–1.3)
CREAT UR-MCNC: <13 MG/DL
GFR SERPL CREATININE-BSD FRML MDRD: 28 ML/MIN/1.73SQ M
GLUCOSE P FAST SERPL-MCNC: 96 MG/DL (ref 65–99)
GLUCOSE UR STRIP-MCNC: NEGATIVE MG/DL
HGB UR QL STRIP.AUTO: NEGATIVE
KETONES UR STRIP-MCNC: NEGATIVE MG/DL
LEUKOCYTE ESTERASE UR QL STRIP: NEGATIVE
MAGNESIUM SERPL-MCNC: 2.5 MG/DL (ref 1.6–2.6)
NITRITE UR QL STRIP: NEGATIVE
NON-SQ EPI CELLS URNS QL MICRO: ABNORMAL /HPF
PH UR STRIP.AUTO: 6 [PH]
PHOSPHATE SERPL-MCNC: 5 MG/DL (ref 2.7–4.5)
POTASSIUM SERPL-SCNC: 4.4 MMOL/L (ref 3.5–5.3)
PROT SERPL-MCNC: 7.1 G/DL (ref 6.4–8.2)
PROT UR STRIP-MCNC: NEGATIVE MG/DL
PROT UR-MCNC: <6 MG/DL
PTH-INTACT SERPL-MCNC: 131.5 PG/ML (ref 18.4–80.1)
RBC #/AREA URNS AUTO: ABNORMAL /HPF
SODIUM SERPL-SCNC: 144 MMOL/L (ref 136–145)
SP GR UR STRIP.AUTO: <=1.005 (ref 1–1.03)
UROBILINOGEN UR QL STRIP.AUTO: 0.2 E.U./DL
WBC #/AREA URNS AUTO: ABNORMAL /HPF

## 2019-04-19 PROCEDURE — 36415 COLL VENOUS BLD VENIPUNCTURE: CPT | Performed by: INTERNAL MEDICINE

## 2019-04-19 PROCEDURE — 84100 ASSAY OF PHOSPHORUS: CPT | Performed by: INTERNAL MEDICINE

## 2019-04-19 PROCEDURE — 83970 ASSAY OF PARATHORMONE: CPT | Performed by: INTERNAL MEDICINE

## 2019-04-19 PROCEDURE — 81001 URINALYSIS AUTO W/SCOPE: CPT | Performed by: INTERNAL MEDICINE

## 2019-04-19 PROCEDURE — 84156 ASSAY OF PROTEIN URINE: CPT | Performed by: INTERNAL MEDICINE

## 2019-04-19 PROCEDURE — 80053 COMPREHEN METABOLIC PANEL: CPT | Performed by: INTERNAL MEDICINE

## 2019-04-19 PROCEDURE — 82570 ASSAY OF URINE CREATININE: CPT | Performed by: INTERNAL MEDICINE

## 2019-04-19 PROCEDURE — 82306 VITAMIN D 25 HYDROXY: CPT | Performed by: INTERNAL MEDICINE

## 2019-04-19 PROCEDURE — 83735 ASSAY OF MAGNESIUM: CPT | Performed by: INTERNAL MEDICINE

## 2019-04-23 ENCOUNTER — TELEPHONE (OUTPATIENT)
Dept: NEPHROLOGY | Facility: CLINIC | Age: 59
End: 2019-04-23

## 2019-04-23 ENCOUNTER — TELEPHONE (OUTPATIENT)
Dept: ENDOCRINOLOGY | Facility: HOSPITAL | Age: 59
End: 2019-04-23

## 2019-04-26 ENCOUNTER — HOSPITAL ENCOUNTER (OUTPATIENT)
Dept: MAMMOGRAPHY | Facility: CLINIC | Age: 59
Discharge: HOME | End: 2019-04-26
Payer: MEDICARE

## 2019-04-26 VITALS — WEIGHT: 190 LBS | BODY MASS INDEX: 29.82 KG/M2 | HEIGHT: 67 IN

## 2019-04-26 DIAGNOSIS — Z12.39 SCREENING FOR BREAST CANCER: ICD-10-CM

## 2019-04-26 PROCEDURE — 77067 SCR MAMMO BI INCL CAD: CPT

## 2019-04-26 PROCEDURE — 77063 BREAST TOMOSYNTHESIS BI: CPT

## 2019-04-30 RX ORDER — TRAMADOL HYDROCHLORIDE 50 MG/1
TABLET ORAL
Refills: 2 | COMMUNITY
Start: 2019-04-15 | End: 2019-05-01 | Stop reason: ALTCHOICE

## 2019-05-01 ENCOUNTER — CONSULT (OUTPATIENT)
Dept: ENDOCRINOLOGY | Facility: HOSPITAL | Age: 59
End: 2019-05-01
Attending: INTERNAL MEDICINE
Payer: MEDICARE

## 2019-05-01 ENCOUNTER — HOSPITAL ENCOUNTER (OUTPATIENT)
Dept: RADIOLOGY | Facility: HOSPITAL | Age: 59
Discharge: HOME | End: 2019-05-01
Attending: INTERNAL MEDICINE
Payer: MEDICARE

## 2019-05-01 ENCOUNTER — OFFICE VISIT (OUTPATIENT)
Dept: FAMILY MEDICINE CLINIC | Facility: HOSPITAL | Age: 59
End: 2019-05-01
Payer: COMMERCIAL

## 2019-05-01 ENCOUNTER — TELEPHONE (OUTPATIENT)
Dept: FAMILY MEDICINE CLINIC | Facility: HOSPITAL | Age: 59
End: 2019-05-01

## 2019-05-01 VITALS
HEIGHT: 67 IN | OXYGEN SATURATION: 98 % | DIASTOLIC BLOOD PRESSURE: 80 MMHG | BODY MASS INDEX: 29.51 KG/M2 | WEIGHT: 188 LBS | SYSTOLIC BLOOD PRESSURE: 128 MMHG | HEART RATE: 99 BPM

## 2019-05-01 VITALS
BODY MASS INDEX: 29.57 KG/M2 | WEIGHT: 188.4 LBS | DIASTOLIC BLOOD PRESSURE: 90 MMHG | HEIGHT: 67 IN | HEART RATE: 94 BPM | SYSTOLIC BLOOD PRESSURE: 138 MMHG

## 2019-05-01 DIAGNOSIS — E21.3 HYPERPARATHYROIDISM (HCC): ICD-10-CM

## 2019-05-01 DIAGNOSIS — Z23 NEED FOR SHINGLES VACCINE: ICD-10-CM

## 2019-05-01 DIAGNOSIS — E22.1 HYPERPROLACTINEMIA (HCC): Primary | ICD-10-CM

## 2019-05-01 DIAGNOSIS — F40.01 PANIC DISORDER WITH AGORAPHOBIA: ICD-10-CM

## 2019-05-01 DIAGNOSIS — M25.561 PAIN AND SWELLING OF RIGHT KNEE: ICD-10-CM

## 2019-05-01 DIAGNOSIS — N18.30 CHRONIC KIDNEY DISEASE, STAGE 3 (HCC): ICD-10-CM

## 2019-05-01 DIAGNOSIS — I63.9 INFARCTION OF LEFT BASAL GANGLIA (HCC): ICD-10-CM

## 2019-05-01 DIAGNOSIS — I31.3 PERICARDIAL EFFUSION: Primary | ICD-10-CM

## 2019-05-01 DIAGNOSIS — F31.4 BIPOLAR 1 DISORDER, DEPRESSED, SEVERE (HCC): ICD-10-CM

## 2019-05-01 DIAGNOSIS — E22.1 HYPERPROLACTINEMIA (HCC): ICD-10-CM

## 2019-05-01 DIAGNOSIS — M25.461 PAIN AND SWELLING OF RIGHT KNEE: ICD-10-CM

## 2019-05-01 DIAGNOSIS — I73.9 PAD (PERIPHERAL ARTERY DISEASE) (HCC): ICD-10-CM

## 2019-05-01 PROCEDURE — 99214 OFFICE O/P EST MOD 30 MIN: CPT | Performed by: INTERNAL MEDICINE

## 2019-05-01 PROCEDURE — 73564 X-RAY EXAM KNEE 4 OR MORE: CPT

## 2019-05-02 ENCOUNTER — OFFICE VISIT (OUTPATIENT)
Dept: OBGYN CLINIC | Facility: CLINIC | Age: 59
End: 2019-05-02
Payer: MEDICARE

## 2019-05-02 VITALS
BODY MASS INDEX: 29.35 KG/M2 | WEIGHT: 187 LBS | DIASTOLIC BLOOD PRESSURE: 80 MMHG | HEART RATE: 82 BPM | SYSTOLIC BLOOD PRESSURE: 128 MMHG | HEIGHT: 67 IN

## 2019-05-02 DIAGNOSIS — M25.561 PAIN AND SWELLING OF RIGHT KNEE: ICD-10-CM

## 2019-05-02 DIAGNOSIS — S80.01XA CONTUSION OF RIGHT KNEE, INITIAL ENCOUNTER: Primary | ICD-10-CM

## 2019-05-02 DIAGNOSIS — M25.461 PAIN AND SWELLING OF RIGHT KNEE: ICD-10-CM

## 2019-05-02 PROCEDURE — 99213 OFFICE O/P EST LOW 20 MIN: CPT | Performed by: ORTHOPAEDIC SURGERY

## 2019-05-04 DIAGNOSIS — N18.30 CHRONIC KIDNEY DISEASE, STAGE 3 (HCC): ICD-10-CM

## 2019-05-04 DIAGNOSIS — E23.2 DIABETES INSIPIDUS (HCC): ICD-10-CM

## 2019-05-04 DIAGNOSIS — N25.81 SECONDARY RENAL HYPERPARATHYROIDISM (HCC): ICD-10-CM

## 2019-05-04 DIAGNOSIS — I10 ESSENTIAL HYPERTENSION: ICD-10-CM

## 2019-05-04 DIAGNOSIS — N28.1 RENAL CYST: ICD-10-CM

## 2019-05-05 RX ORDER — CARVEDILOL 3.12 MG/1
TABLET ORAL
Qty: 60 TABLET | Refills: 2 | Status: SHIPPED | OUTPATIENT
Start: 2019-05-05 | End: 2019-08-05 | Stop reason: SDUPTHER

## 2019-05-15 ENCOUNTER — OFFICE VISIT (OUTPATIENT)
Dept: NEPHROLOGY | Facility: HOSPITAL | Age: 59
End: 2019-05-15
Payer: MEDICARE

## 2019-05-15 VITALS
DIASTOLIC BLOOD PRESSURE: 77 MMHG | BODY MASS INDEX: 29.32 KG/M2 | HEART RATE: 92 BPM | WEIGHT: 186.8 LBS | SYSTOLIC BLOOD PRESSURE: 125 MMHG | HEIGHT: 67 IN

## 2019-05-15 DIAGNOSIS — N18.4 CKD (CHRONIC KIDNEY DISEASE) STAGE 4, GFR 15-29 ML/MIN (HCC): Primary | ICD-10-CM

## 2019-05-15 PROCEDURE — 99214 OFFICE O/P EST MOD 30 MIN: CPT | Performed by: INTERNAL MEDICINE

## 2019-06-03 ENCOUNTER — OFFICE VISIT (OUTPATIENT)
Dept: PODIATRY | Facility: CLINIC | Age: 59
End: 2019-06-03
Payer: MEDICARE

## 2019-06-03 VITALS
BODY MASS INDEX: 29.19 KG/M2 | WEIGHT: 186 LBS | HEIGHT: 67 IN | DIASTOLIC BLOOD PRESSURE: 80 MMHG | SYSTOLIC BLOOD PRESSURE: 125 MMHG

## 2019-06-03 DIAGNOSIS — I73.9 PAD (PERIPHERAL ARTERY DISEASE) (HCC): ICD-10-CM

## 2019-06-03 DIAGNOSIS — M21.612 BUNION, LEFT FOOT: Primary | ICD-10-CM

## 2019-06-03 DIAGNOSIS — M21.612 BUNION OF GREAT TOE OF LEFT FOOT: ICD-10-CM

## 2019-06-03 PROCEDURE — 99214 OFFICE O/P EST MOD 30 MIN: CPT | Performed by: PODIATRIST

## 2019-06-05 ENCOUNTER — OFFICE VISIT (OUTPATIENT)
Dept: PULMONOLOGY | Facility: HOSPITAL | Age: 59
End: 2019-06-05
Attending: HOSPITALIST
Payer: MEDICARE

## 2019-06-05 VITALS
WEIGHT: 192 LBS | HEART RATE: 101 BPM | HEIGHT: 67 IN | DIASTOLIC BLOOD PRESSURE: 84 MMHG | SYSTOLIC BLOOD PRESSURE: 122 MMHG | OXYGEN SATURATION: 67 % | TEMPERATURE: 97.8 F | BODY MASS INDEX: 30.13 KG/M2

## 2019-06-05 DIAGNOSIS — R93.89 ABNORMAL CHEST CT: Primary | ICD-10-CM

## 2019-06-05 DIAGNOSIS — Z87.898 HISTORY OF SNORING: ICD-10-CM

## 2019-06-05 PROCEDURE — 99214 OFFICE O/P EST MOD 30 MIN: CPT | Performed by: INTERNAL MEDICINE

## 2019-06-08 ENCOUNTER — HOSPITAL ENCOUNTER (OUTPATIENT)
Dept: CT IMAGING | Facility: HOSPITAL | Age: 59
Discharge: HOME | End: 2019-06-08
Attending: INTERNAL MEDICINE
Payer: MEDICARE

## 2019-06-08 DIAGNOSIS — R93.89 ABNORMAL CHEST CT: ICD-10-CM

## 2019-06-08 PROCEDURE — 71250 CT THORAX DX C-: CPT

## 2019-06-10 ENCOUNTER — OFFICE VISIT (OUTPATIENT)
Dept: GASTROENTEROLOGY | Facility: CLINIC | Age: 59
End: 2019-06-10
Payer: MEDICARE

## 2019-06-10 VITALS
SYSTOLIC BLOOD PRESSURE: 132 MMHG | HEART RATE: 94 BPM | DIASTOLIC BLOOD PRESSURE: 92 MMHG | WEIGHT: 194 LBS | BODY MASS INDEX: 30.45 KG/M2 | HEIGHT: 67 IN

## 2019-06-10 DIAGNOSIS — K62.3 RECTAL PROLAPSE: Primary | ICD-10-CM

## 2019-06-10 DIAGNOSIS — D12.6 BENIGN NEOPLASM OF COLON, UNSPECIFIED PART OF COLON: ICD-10-CM

## 2019-06-10 DIAGNOSIS — K59.03 DRUG-INDUCED CONSTIPATION: ICD-10-CM

## 2019-06-10 PROCEDURE — 99213 OFFICE O/P EST LOW 20 MIN: CPT | Performed by: INTERNAL MEDICINE

## 2019-06-11 ENCOUNTER — TELEPHONE (OUTPATIENT)
Dept: FAMILY MEDICINE CLINIC | Facility: HOSPITAL | Age: 59
End: 2019-06-11

## 2019-06-11 ENCOUNTER — TELEPHONE (OUTPATIENT)
Dept: GASTROENTEROLOGY | Facility: CLINIC | Age: 59
End: 2019-06-11

## 2019-06-12 ENCOUNTER — APPOINTMENT (OUTPATIENT)
Dept: LAB | Facility: HOSPITAL | Age: 59
End: 2019-06-12
Payer: MEDICARE

## 2019-06-12 ENCOUNTER — HOSPITAL ENCOUNTER (OUTPATIENT)
Dept: RADIOLOGY | Facility: HOSPITAL | Age: 59
Discharge: HOME | End: 2019-06-12
Attending: PODIATRIST
Payer: MEDICARE

## 2019-06-12 ENCOUNTER — HOSPITAL ENCOUNTER (OUTPATIENT)
Dept: NON INVASIVE DIAGNOSTICS | Facility: HOSPITAL | Age: 59
Discharge: HOME | End: 2019-06-12
Attending: PODIATRIST
Payer: MEDICARE

## 2019-06-12 DIAGNOSIS — M21.612 BUNION, LEFT FOOT: ICD-10-CM

## 2019-06-12 DIAGNOSIS — R93.89 ABNORMAL CHEST CT: Primary | ICD-10-CM

## 2019-06-12 DIAGNOSIS — N18.4 CKD (CHRONIC KIDNEY DISEASE) STAGE 4, GFR 15-29 ML/MIN (HCC): ICD-10-CM

## 2019-06-12 LAB
ALBUMIN SERPL BCP-MCNC: 3.8 G/DL (ref 3.5–5)
ALP SERPL-CCNC: 225 U/L (ref 46–116)
ALT SERPL W P-5'-P-CCNC: 42 U/L (ref 12–78)
ANION GAP SERPL CALCULATED.3IONS-SCNC: 9 MMOL/L (ref 4–13)
AST SERPL W P-5'-P-CCNC: 27 U/L (ref 5–45)
BASOPHILS # BLD AUTO: 0.07 THOUSANDS/ΜL (ref 0–0.1)
BASOPHILS NFR BLD AUTO: 1 % (ref 0–1)
BILIRUB SERPL-MCNC: 0.2 MG/DL (ref 0.2–1)
BUN SERPL-MCNC: 44 MG/DL (ref 5–25)
CALCIUM SERPL-MCNC: 9.6 MG/DL (ref 8.3–10.1)
CHLORIDE SERPL-SCNC: 108 MMOL/L (ref 100–108)
CO2 SERPL-SCNC: 25 MMOL/L (ref 21–32)
CORTIS AM PEAK SERPL-MCNC: 14.8 UG/DL (ref 4.2–22.4)
CREAT SERPL-MCNC: 2.06 MG/DL (ref 0.6–1.3)
EOSINOPHIL # BLD AUTO: 0.33 THOUSAND/ΜL (ref 0–0.61)
EOSINOPHIL NFR BLD AUTO: 6 % (ref 0–6)
ERYTHROCYTE [DISTWIDTH] IN BLOOD BY AUTOMATED COUNT: 13.4 % (ref 11.6–15.1)
ESTRADIOL SERPL-MCNC: <11 PG/ML
FSH SERPL-ACNC: 130.7 MIU/ML
GFR SERPL CREATININE-BSD FRML MDRD: 26 ML/MIN/1.73SQ M
GLUCOSE P FAST SERPL-MCNC: 106 MG/DL (ref 65–99)
HCT VFR BLD AUTO: 41.8 % (ref 34.8–46.1)
HGB BLD-MCNC: 13.2 G/DL (ref 11.5–15.4)
IMM GRANULOCYTES # BLD AUTO: 0.01 THOUSAND/UL (ref 0–0.2)
IMM GRANULOCYTES NFR BLD AUTO: 0 % (ref 0–2)
LH SERPL-ACNC: 66.2 MIU/ML
LYMPHOCYTES # BLD AUTO: 1.2 THOUSANDS/ΜL (ref 0.6–4.47)
LYMPHOCYTES NFR BLD AUTO: 20 % (ref 14–44)
MAGNESIUM SERPL-MCNC: 2.2 MG/DL (ref 1.6–2.6)
MCH RBC QN AUTO: 30.8 PG (ref 26.8–34.3)
MCHC RBC AUTO-ENTMCNC: 31.6 G/DL (ref 31.4–37.4)
MCV RBC AUTO: 98 FL (ref 82–98)
MONOCYTES # BLD AUTO: 0.55 THOUSAND/ΜL (ref 0.17–1.22)
MONOCYTES NFR BLD AUTO: 9 % (ref 4–12)
NEUTROPHILS # BLD AUTO: 3.72 THOUSANDS/ΜL (ref 1.85–7.62)
NEUTS SEG NFR BLD AUTO: 64 % (ref 43–75)
NRBC BLD AUTO-RTO: 0 /100 WBCS
PHOSPHATE SERPL-MCNC: 4.8 MG/DL (ref 2.7–4.5)
PLATELET # BLD AUTO: 292 THOUSANDS/UL (ref 149–390)
PMV BLD AUTO: 9.7 FL (ref 8.9–12.7)
POTASSIUM SERPL-SCNC: 4.4 MMOL/L (ref 3.5–5.3)
PROLACTIN SERPL-MCNC: 15 NG/ML
PROT SERPL-MCNC: 7.4 G/DL (ref 6.4–8.2)
PTH-INTACT SERPL-MCNC: 138.2 PG/ML (ref 18.4–80.1)
RBC # BLD AUTO: 4.28 MILLION/UL (ref 3.81–5.12)
SODIUM SERPL-SCNC: 142 MMOL/L (ref 136–145)
T4 FREE SERPL-MCNC: 0.57 NG/DL (ref 0.76–1.46)
TSH SERPL DL<=0.05 MIU/L-ACNC: 2.44 UIU/ML (ref 0.36–3.74)
WBC # BLD AUTO: 5.88 THOUSAND/UL (ref 4.31–10.16)

## 2019-06-12 PROCEDURE — 84146 ASSAY OF PROLACTIN: CPT | Performed by: INTERNAL MEDICINE

## 2019-06-12 PROCEDURE — 36415 COLL VENOUS BLD VENIPUNCTURE: CPT

## 2019-06-12 PROCEDURE — 84443 ASSAY THYROID STIM HORMONE: CPT | Performed by: INTERNAL MEDICINE

## 2019-06-12 PROCEDURE — 84439 ASSAY OF FREE THYROXINE: CPT | Performed by: INTERNAL MEDICINE

## 2019-06-12 PROCEDURE — 82670 ASSAY OF TOTAL ESTRADIOL: CPT | Performed by: INTERNAL MEDICINE

## 2019-06-12 PROCEDURE — 83001 ASSAY OF GONADOTROPIN (FSH): CPT | Performed by: INTERNAL MEDICINE

## 2019-06-12 PROCEDURE — 80053 COMPREHEN METABOLIC PANEL: CPT | Performed by: INTERNAL MEDICINE

## 2019-06-12 PROCEDURE — 84100 ASSAY OF PHOSPHORUS: CPT | Performed by: INTERNAL MEDICINE

## 2019-06-12 PROCEDURE — 73630 X-RAY EXAM OF FOOT: CPT

## 2019-06-12 PROCEDURE — 83970 ASSAY OF PARATHORMONE: CPT | Performed by: INTERNAL MEDICINE

## 2019-06-12 PROCEDURE — 83002 ASSAY OF GONADOTROPIN (LH): CPT | Performed by: INTERNAL MEDICINE

## 2019-06-12 PROCEDURE — 82533 TOTAL CORTISOL: CPT | Performed by: INTERNAL MEDICINE

## 2019-06-12 PROCEDURE — 84305 ASSAY OF SOMATOMEDIN: CPT | Performed by: INTERNAL MEDICINE

## 2019-06-12 PROCEDURE — 83735 ASSAY OF MAGNESIUM: CPT | Performed by: INTERNAL MEDICINE

## 2019-06-12 PROCEDURE — 85025 COMPLETE CBC W/AUTO DIFF WBC: CPT

## 2019-06-12 PROCEDURE — 93005 ELECTROCARDIOGRAM TRACING: CPT

## 2019-06-13 ENCOUNTER — TELEPHONE (OUTPATIENT)
Dept: OTHER | Facility: HOSPITAL | Age: 59
End: 2019-06-13

## 2019-06-13 ENCOUNTER — TELEPHONE (OUTPATIENT)
Dept: PULMONOLOGY | Facility: CLINIC | Age: 59
End: 2019-06-13

## 2019-06-13 ENCOUNTER — OFFICE VISIT (OUTPATIENT)
Dept: FAMILY MEDICINE CLINIC | Facility: HOSPITAL | Age: 59
End: 2019-06-13
Payer: MEDICARE

## 2019-06-13 VITALS
DIASTOLIC BLOOD PRESSURE: 84 MMHG | SYSTOLIC BLOOD PRESSURE: 138 MMHG | BODY MASS INDEX: 30.29 KG/M2 | HEART RATE: 93 BPM | OXYGEN SATURATION: 94 % | WEIGHT: 193 LBS | HEIGHT: 67 IN

## 2019-06-13 DIAGNOSIS — N25.81 SECONDARY RENAL HYPERPARATHYROIDISM (HCC): ICD-10-CM

## 2019-06-13 DIAGNOSIS — E83.39 HYPERPHOSPHATEMIA: Primary | ICD-10-CM

## 2019-06-13 DIAGNOSIS — N18.30 CHRONIC KIDNEY DISEASE, STAGE 3 (HCC): ICD-10-CM

## 2019-06-13 DIAGNOSIS — M21.612 BUNION OF GREAT TOE OF LEFT FOOT: ICD-10-CM

## 2019-06-13 DIAGNOSIS — M81.0 AGE-RELATED OSTEOPOROSIS WITHOUT CURRENT PATHOLOGICAL FRACTURE: Chronic | ICD-10-CM

## 2019-06-13 DIAGNOSIS — F31.4 BIPOLAR 1 DISORDER, DEPRESSED, SEVERE (HCC): ICD-10-CM

## 2019-06-13 DIAGNOSIS — E21.3 HYPERPARATHYROIDISM (HCC): ICD-10-CM

## 2019-06-13 DIAGNOSIS — Z01.810 PREOP CARDIOVASCULAR EXAM: Primary | ICD-10-CM

## 2019-06-13 DIAGNOSIS — F31.81 BIPOLAR 2 DISORDER (HCC): Chronic | ICD-10-CM

## 2019-06-13 DIAGNOSIS — E78.00 HYPERCHOLESTEREMIA: Chronic | ICD-10-CM

## 2019-06-13 LAB
ATRIAL RATE: 84 BPM
P AXIS: 73 DEGREES
PR INTERVAL: 128 MS
QRS AXIS: 60 DEGREES
QRSD INTERVAL: 98 MS
QT INTERVAL: 398 MS
QTC INTERVAL: 470 MS
T WAVE AXIS: 65 DEGREES
VENTRICULAR RATE: 84 BPM

## 2019-06-13 PROCEDURE — 99214 OFFICE O/P EST MOD 30 MIN: CPT | Performed by: INTERNAL MEDICINE

## 2019-06-13 PROCEDURE — 93010 ELECTROCARDIOGRAM REPORT: CPT | Performed by: INTERNAL MEDICINE

## 2019-06-13 RX ORDER — CLONAZEPAM 0.5 MG/1
0.5 TABLET ORAL 3 TIMES DAILY
Qty: 270 TABLET | Refills: 0 | Status: SHIPPED | OUTPATIENT
Start: 2019-06-13 | End: 2019-07-03 | Stop reason: SDUPTHER
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
## 2019-06-14 LAB — IGF-I SERPL-MCNC: 182 NG/ML (ref 46–172)

## 2019-06-17 DIAGNOSIS — R94.6 ABNORMAL THYROID EXAM: Primary | ICD-10-CM

## 2019-06-18 ENCOUNTER — TELEPHONE (OUTPATIENT)
Dept: NEPHROLOGY | Facility: CLINIC | Age: 59
End: 2019-06-18

## 2019-06-19 ENCOUNTER — ANESTHESIA EVENT (OUTPATIENT)
Dept: PERIOP | Facility: HOSPITAL | Age: 59
End: 2019-06-19
Payer: MEDICARE

## 2019-06-19 DIAGNOSIS — K21.9 GASTROESOPHAGEAL REFLUX DISEASE, ESOPHAGITIS PRESENCE NOT SPECIFIED: ICD-10-CM

## 2019-06-19 RX ORDER — OMEPRAZOLE 40 MG/1
CAPSULE, DELAYED RELEASE ORAL
Qty: 90 CAPSULE | Refills: 3 | Status: SHIPPED | OUTPATIENT
Start: 2019-06-19 | End: 2020-02-20 | Stop reason: SDUPTHER

## 2019-06-21 ENCOUNTER — HOSPITAL ENCOUNTER (OUTPATIENT)
Dept: NON INVASIVE DIAGNOSTICS | Facility: HOSPITAL | Age: 59
Discharge: HOME | End: 2019-06-21
Attending: PODIATRIST
Payer: MEDICARE

## 2019-06-21 DIAGNOSIS — I73.9 PAD (PERIPHERAL ARTERY DISEASE) (HCC): ICD-10-CM

## 2019-06-21 PROCEDURE — 93925 LOWER EXTREMITY STUDY: CPT

## 2019-06-21 PROCEDURE — 93925 LOWER EXTREMITY STUDY: CPT | Performed by: SURGERY

## 2019-06-21 PROCEDURE — 93923 UPR/LXTR ART STDY 3+ LVLS: CPT

## 2019-06-21 PROCEDURE — 93922 UPR/L XTREMITY ART 2 LEVELS: CPT | Performed by: SURGERY

## 2019-06-22 ENCOUNTER — HOSPITAL ENCOUNTER (OUTPATIENT)
Dept: ULTRASOUND IMAGING | Facility: HOSPITAL | Age: 59
Discharge: HOME | End: 2019-06-22
Payer: MEDICARE

## 2019-06-22 DIAGNOSIS — R94.6 ABNORMAL THYROID EXAM: ICD-10-CM

## 2019-06-22 PROCEDURE — 76536 US EXAM OF HEAD AND NECK: CPT

## 2019-06-25 ENCOUNTER — TELEPHONE (OUTPATIENT)
Dept: NEPHROLOGY | Facility: CLINIC | Age: 59
End: 2019-06-25

## 2019-06-26 ENCOUNTER — TELEPHONE (OUTPATIENT)
Dept: ENDOCRINOLOGY | Facility: HOSPITAL | Age: 59
End: 2019-06-26

## 2019-06-28 ENCOUNTER — TELEPHONE (OUTPATIENT)
Dept: FAMILY MEDICINE CLINIC | Facility: HOSPITAL | Age: 59
End: 2019-06-28

## 2019-06-28 DIAGNOSIS — E04.2 MULTIPLE THYROID NODULES: Primary | ICD-10-CM

## 2019-07-01 ENCOUNTER — ANESTHESIA (OUTPATIENT)
Dept: PERIOP | Facility: HOSPITAL | Age: 59
End: 2019-07-01
Payer: MEDICARE

## 2019-07-01 ENCOUNTER — HOSPITAL ENCOUNTER (OUTPATIENT)
Facility: HOSPITAL | Age: 59
Setting detail: OUTPATIENT SURGERY
Discharge: HOME | End: 2019-07-01
Attending: PODIATRIST | Admitting: PODIATRIST
Payer: MEDICARE

## 2019-07-01 ENCOUNTER — APPOINTMENT (OUTPATIENT)
Dept: RADIOLOGY | Facility: HOSPITAL | Age: 59
End: 2019-07-01
Payer: MEDICARE

## 2019-07-01 VITALS
HEART RATE: 100 BPM | SYSTOLIC BLOOD PRESSURE: 130 MMHG | DIASTOLIC BLOOD PRESSURE: 71 MMHG | BODY MASS INDEX: 29.88 KG/M2 | OXYGEN SATURATION: 95 % | HEIGHT: 67 IN | WEIGHT: 190.4 LBS | RESPIRATION RATE: 16 BRPM | TEMPERATURE: 99 F

## 2019-07-01 DIAGNOSIS — M21.612 BUNION OF GREAT TOE OF LEFT FOOT: Primary | ICD-10-CM

## 2019-07-01 PROCEDURE — 28296 COR HLX VLGS DSTL MTAR OSTEO: CPT | Performed by: PODIATRIST

## 2019-07-01 PROCEDURE — 99024 POSTOP FOLLOW-UP VISIT: CPT | Performed by: PODIATRIST

## 2019-07-01 PROCEDURE — C1769 GUIDE WIRE: HCPCS | Performed by: PODIATRIST

## 2019-07-01 PROCEDURE — 73630 X-RAY EXAM OF FOOT: CPT

## 2019-07-01 PROCEDURE — C1713 ANCHOR/SCREW BN/BN,TIS/BN: HCPCS | Performed by: PODIATRIST

## 2019-07-01 DEVICE — IMPLANTABLE DEVICE: Type: IMPLANTABLE DEVICE | Site: FOOT | Status: FUNCTIONAL

## 2019-07-01 DEVICE — SCREW COMP 2.5 X 18MM MICRO FT: Type: IMPLANTABLE DEVICE | Site: FOOT | Status: FUNCTIONAL

## 2019-07-01 RX ORDER — HYDRALAZINE HYDROCHLORIDE 20 MG/ML
5 INJECTION INTRAMUSCULAR; INTRAVENOUS AS NEEDED
Status: DISCONTINUED | OUTPATIENT
Start: 2019-07-01 | End: 2019-07-01 | Stop reason: HOSPADM

## 2019-07-01 RX ORDER — PROMETHAZINE HYDROCHLORIDE 25 MG/ML
6.25 INJECTION, SOLUTION INTRAMUSCULAR; INTRAVENOUS ONCE AS NEEDED
Status: DISCONTINUED | OUTPATIENT
Start: 2019-07-01 | End: 2019-07-01 | Stop reason: HOSPADM

## 2019-07-01 RX ORDER — HYDROMORPHONE HCL/PF 1 MG/ML
0.2 SYRINGE (ML) INJECTION
Status: DISCONTINUED | OUTPATIENT
Start: 2019-07-01 | End: 2019-07-01 | Stop reason: HOSPADM

## 2019-07-01 RX ORDER — PROPOFOL 10 MG/ML
INJECTION, EMULSION INTRAVENOUS CONTINUOUS PRN
Status: DISCONTINUED | OUTPATIENT
Start: 2019-07-01 | End: 2019-07-01 | Stop reason: SURG

## 2019-07-01 RX ORDER — MEPERIDINE HYDROCHLORIDE 25 MG/ML
12.5 INJECTION INTRAMUSCULAR; INTRAVENOUS; SUBCUTANEOUS
Status: DISCONTINUED | OUTPATIENT
Start: 2019-07-01 | End: 2019-07-01 | Stop reason: HOSPADM

## 2019-07-01 RX ORDER — PROPOFOL 10 MG/ML
INJECTION, EMULSION INTRAVENOUS AS NEEDED
Status: DISCONTINUED | OUTPATIENT
Start: 2019-07-01 | End: 2019-07-01 | Stop reason: SURG

## 2019-07-01 RX ORDER — METOCLOPRAMIDE HYDROCHLORIDE 5 MG/ML
10 INJECTION INTRAMUSCULAR; INTRAVENOUS ONCE AS NEEDED
Status: DISCONTINUED | OUTPATIENT
Start: 2019-07-01 | End: 2019-07-01 | Stop reason: HOSPADM

## 2019-07-01 RX ORDER — FENTANYL CITRATE/PF 50 MCG/ML
25 SYRINGE (ML) INJECTION
Status: DISCONTINUED | OUTPATIENT
Start: 2019-07-01 | End: 2019-07-01 | Stop reason: HOSPADM

## 2019-07-01 RX ORDER — ONDANSETRON 2 MG/ML
4 INJECTION INTRAMUSCULAR; INTRAVENOUS ONCE AS NEEDED
Status: DISCONTINUED | OUTPATIENT
Start: 2019-07-01 | End: 2019-07-01 | Stop reason: HOSPADM

## 2019-07-01 RX ORDER — OXYCODONE HYDROCHLORIDE AND ACETAMINOPHEN 5; 325 MG/1; MG/1
1 TABLET ORAL EVERY 4 HOURS PRN
Status: DISCONTINUED | OUTPATIENT
Start: 2019-07-01 | End: 2019-07-01 | Stop reason: HOSPADM

## 2019-07-01 RX ORDER — HYDROMORPHONE HCL/PF 1 MG/ML
0.5 SYRINGE (ML) INJECTION
Status: DISCONTINUED | OUTPATIENT
Start: 2019-07-01 | End: 2019-07-01 | Stop reason: HOSPADM

## 2019-07-01 RX ORDER — MIDAZOLAM HYDROCHLORIDE 1 MG/ML
INJECTION INTRAMUSCULAR; INTRAVENOUS AS NEEDED
Status: DISCONTINUED | OUTPATIENT
Start: 2019-07-01 | End: 2019-07-01 | Stop reason: SURG

## 2019-07-01 RX ORDER — OXYCODONE HYDROCHLORIDE AND ACETAMINOPHEN 5; 325 MG/1; MG/1
1 TABLET ORAL EVERY 4 HOURS PRN
Qty: 20 TABLET | Refills: 0 | Status: SHIPPED | OUTPATIENT
Start: 2019-07-01 | End: 2019-07-11

## 2019-07-01 RX ORDER — LABETALOL 20 MG/4 ML (5 MG/ML) INTRAVENOUS SYRINGE
5 AS NEEDED
Status: DISCONTINUED | OUTPATIENT
Start: 2019-07-01 | End: 2019-07-01 | Stop reason: HOSPADM

## 2019-07-01 RX ORDER — SODIUM CHLORIDE, SODIUM LACTATE, POTASSIUM CHLORIDE, CALCIUM CHLORIDE 600; 310; 30; 20 MG/100ML; MG/100ML; MG/100ML; MG/100ML
75 INJECTION, SOLUTION INTRAVENOUS CONTINUOUS
Status: DISCONTINUED | OUTPATIENT
Start: 2019-07-01 | End: 2019-07-01 | Stop reason: HOSPADM

## 2019-07-01 RX ORDER — CEFAZOLIN SODIUM 2 G/50ML
2000 SOLUTION INTRAVENOUS EVERY 8 HOURS
Status: COMPLETED | OUTPATIENT
Start: 2019-07-01 | End: 2019-07-01

## 2019-07-01 RX ORDER — FENTANYL CITRATE 50 UG/ML
INJECTION, SOLUTION INTRAMUSCULAR; INTRAVENOUS AS NEEDED
Status: DISCONTINUED | OUTPATIENT
Start: 2019-07-01 | End: 2019-07-01 | Stop reason: SURG

## 2019-07-01 RX ORDER — ONDANSETRON 2 MG/ML
INJECTION INTRAMUSCULAR; INTRAVENOUS AS NEEDED
Status: DISCONTINUED | OUTPATIENT
Start: 2019-07-01 | End: 2019-07-01 | Stop reason: SURG

## 2019-07-01 RX ADMIN — PROPOFOL 20 MG: 10 INJECTION, EMULSION INTRAVENOUS at 12:46

## 2019-07-01 RX ADMIN — HYDROMORPHONE HYDROCHLORIDE 0.2 MG: 1 INJECTION, SOLUTION INTRAMUSCULAR; INTRAVENOUS; SUBCUTANEOUS at 14:20

## 2019-07-01 RX ADMIN — MIDAZOLAM 2 MG: 1 INJECTION INTRAMUSCULAR; INTRAVENOUS at 12:30

## 2019-07-01 RX ADMIN — HYDROMORPHONE HYDROCHLORIDE 0.5 MG: 1 INJECTION, SOLUTION INTRAMUSCULAR; INTRAVENOUS; SUBCUTANEOUS at 14:44

## 2019-07-01 RX ADMIN — ONDANSETRON 4 MG: 2 INJECTION INTRAMUSCULAR; INTRAVENOUS at 13:41

## 2019-07-01 RX ADMIN — PROPOFOL 50 MG: 10 INJECTION, EMULSION INTRAVENOUS at 12:39

## 2019-07-01 RX ADMIN — HYDROMORPHONE HYDROCHLORIDE 0.5 MG: 1 INJECTION, SOLUTION INTRAMUSCULAR; INTRAVENOUS; SUBCUTANEOUS at 14:34

## 2019-07-01 RX ADMIN — FENTANYL CITRATE 50 MCG: 50 INJECTION, SOLUTION INTRAMUSCULAR; INTRAVENOUS at 12:37

## 2019-07-01 RX ADMIN — FENTANYL CITRATE 25 MCG: 50 INJECTION, SOLUTION INTRAMUSCULAR; INTRAVENOUS at 14:15

## 2019-07-01 RX ADMIN — PROPOFOL 75 MCG/KG/MIN: 10 INJECTION, EMULSION INTRAVENOUS at 12:47

## 2019-07-01 RX ADMIN — FENTANYL CITRATE 50 MCG: 50 INJECTION, SOLUTION INTRAMUSCULAR; INTRAVENOUS at 13:03

## 2019-07-01 RX ADMIN — OXYCODONE AND ACETAMINOPHEN 1 TABLET: 5; 325 TABLET ORAL at 15:38

## 2019-07-01 RX ADMIN — FENTANYL CITRATE 25 MCG: 50 INJECTION, SOLUTION INTRAMUSCULAR; INTRAVENOUS at 14:08

## 2019-07-01 RX ADMIN — CEFAZOLIN SODIUM 2000 MG: 2 SOLUTION INTRAVENOUS at 12:37

## 2019-07-01 RX ADMIN — PROPOFOL 50 MG: 10 INJECTION, EMULSION INTRAVENOUS at 13:02

## 2019-07-01 RX ADMIN — HYDROMORPHONE HYDROCHLORIDE 0.2 MG: 1 INJECTION, SOLUTION INTRAMUSCULAR; INTRAVENOUS; SUBCUTANEOUS at 14:28

## 2019-07-01 RX ADMIN — SODIUM CHLORIDE, SODIUM LACTATE, POTASSIUM CHLORIDE, AND CALCIUM CHLORIDE 75 ML/HR: .6; .31; .03; .02 INJECTION, SOLUTION INTRAVENOUS at 10:59

## 2019-07-01 RX ADMIN — PROPOFOL 50 MG: 10 INJECTION, EMULSION INTRAVENOUS at 12:40

## 2019-07-01 NOTE — ANESTHESIA PREPROCEDURE EVALUATION
Review of Systems/Medical History  Patient summary reviewed  Chart reviewed  History of anesthetic complications PONV    Cardiovascular  EKG reviewed, Exercise tolerance (METS): >4,  Hyperlipidemia, Hypertension ,    Pulmonary  Negative pulmonary ROS Not a smoker ,        GI/Hepatic    GERD well controlled,        Chronic kidney disease stage 3,        Endo/Other  Negative endo/other ROS      GYN  Negative gynecology ROS          Hematology  Negative hematology ROS      Musculoskeletal    Arthritis     Neurology    CVA , no residual symptoms,    Psychology   Anxiety, Depression , bipolar disorder, Schizophrenia             Physical Exam    Airway    Mallampati score: II  TM Distance: >3 FB  Neck ROM: full     Dental   No notable dental hx upper dentures and lower dentures,     Cardiovascular  Rhythm: regular, Rate: normal, Cardiovascular exam normal    Pulmonary  Pulmonary exam normal Breath sounds clear to auscultation,     Other Findings        Anesthesia Plan  ASA Score- 3     Anesthesia Type- IV sedation with anesthesia with ASA Monitors  Additional Monitors:   Airway Plan:     Comment: Discussed plan for MAC/TIVA w/GA as back-up  Romi Daniel Plan Factors-    Induction-     Postoperative Plan- Plan for postoperative opioid use  Informed Consent- Anesthetic plan and risks discussed with patient

## 2019-07-01 NOTE — DISCHARGE SUMMARY
Discharge Summary Outpatient Procedure Podiatry -   Jeyson Quiroga 64 y o  female MRN: [id number]  Unit/Bed#: OR POOL Encounter: [id number]    Admission Date: 7/1/2019     Admitting Diagnosis: Bunion of great toe of left foot [M21 612]    Discharge Diagnosis: same    Procedures Performed: Gokul Williamson bunionectomy: 96573 (CPT®)    Complications: none    Condition at Discharge: stable    Discharge instructions/Information to patient and family:   See after visit summary for information provided to patient and family  Provisions for Follow-Up Care/Important appointments:  See after visit summary for information related to follow-up care and any pertinent home health orders  Discharge Medications:  See after visit summary for reconciled discharge medications provided to patient and family

## 2019-07-01 NOTE — ANESTHESIA POSTPROCEDURE EVALUATION
Post-Op Assessment Note    CV Status:  Stable  Pain Score: 0    Pain management: adequate     Mental Status:  Alert and awake   Hydration Status:  Stable   PONV Controlled:  None   Airway Patency:  Patent and adequate   Post Op Vitals Reviewed: Yes      Staff: Anesthesiologist           BP      Temp      Pulse     Resp      SpO2

## 2019-07-01 NOTE — OP NOTE
OPERATIVE REPORT  PATIENT NAME: Mary Hollingsworth    :  1960  MRN: [id number]  Pt Location:  OR ROOM 02    SURGERY DATE: 2019    Surgeon(s) and Role:     * Chema Mota DPM - Primary     * Kit Morris DPM - Assisting    Preop Diagnosis:  Bunion of great toe of left foot [M21 612]    Post-Op Diagnosis Codes:     * Bunion of great toe of left foot [M21 612]    Procedure:  CPT: 47306 Bunionectomy with distal metatarsal osteotomy left foot    Specimen(s):  * No specimens in log *    Estimated Blood Loss:   Minimal    Drains:  * No LDAs found *    Anesthesia Type:   IV Sedation with Anesthesia with 20 cc of 1:1 mix of 1% lidocaine plain and 0 25% Marcaine plain    Hemostasis:  Pneumatic ankle tourniquet at 250 mm of mercury for 60 minutes    Materials:  1: Arhrex 2 5x18mm headless compression screw  1:Arthrex 3 5x20mm headless compression screw, 2-0, 3-0 vicryl, 4-0 nylon    Operative Indications:  Bunion of great toe of left foot [M21 612]      Operative Findings:  Serge bunionectomy left foot using 2 screws, adequate reduction confirmed by C-arm of less than 1 hour    Complications:   None    Procedure and Technique:  Under mild sedation, the patient was brought into the operating room and placed on the operating room table in the supine position  A pneumatic ankle tourniquet was then placed around the patient's left ankle with ample webril padding  A time out was performed to confirm the correct patient, procedure and site with all parties in agreement  Following IV sedation, local anesthetic was obtained about the patient's left foot was performed consisting of 20 ml of 1% Lidocaine and 0 25% Bupivacaine in a 1:1 mixture  The foot was then scrubbed, prepped and draped in the usual aseptic manner  An esmarch bandage was utilized to exsangunate the patients foot and the pneumatic ankle tourniquet was then inflated   The esmarch bandage was removed and the foot was placed on the operating room table  Attention was then directed to the dorsal aspect of the first metatarsal where an approximately 6 cm linear incision was made  The incision was deepened through the subcutaneous tissues using sharp and blunt dissection  Care was taken to identify and retract all vital neural and vascular structures  All bleeders were cauterized and ligated as necessary  A capsuloptomy was performed over the dorsal aspect of the MPJ  The periosteal and capsular structures were then carefully dissected free of their osseous attachments and reflected medially and laterally, thus exposing the head of the first metatarsal at the operative site  Attention was then directed to the 1st interspace via the original skin incision where the dissection was continued deep using sharp dissection down to the level of the fibular sesamoid which was free from its soft tissue attachments proximally, laterally and distally  The conjoined tendon of the adductor halluces was identified and transected at its attachment  Next a lateral capsulotomy was performed at the lateral aspect of the 1st MPJ  At this time lateral contraction was noted to be corrected  Attention was then directed to the first met head where the medial prominence was resected by the sagittal bone saw  A k-wire was used as a guidewire at the medial aspect of the 1st met head  A through and through V type osteotomy was made  This cut was created in the metataphyseal region of the bone utilizing a sagittal bone saw and the apices of this osteotomy pointing proximal plantarly and proximal dorsally  Upon completion of this osteotomy, the capital fragment was distracted and shifted laterally into a more corrected position and impacted onto the shaft of the first met  K wires were used as temp fixation across the osteotomy site  With proper AO technique 1: 2 5x18 mm and 1: 3 5x20mm headless compression screws serve as fixation across the osteotomy site   Attention was directed to the remaining medial bone shelf proximal to the osteotomy site which was resected using a sagittal saw and passed from operative field  Correction of the deformity was assessed at this time and noted to be adequate  The wound was then flushed with copious amounts of sterile saline  The periosteal and capsular structures were reapproximated using 2-0 Vicryl  The subQ tissues were reapproximated using 3-0 Vicryl and the skin was reapproximated using 4-0 Nylon in a horizontal mattress suture technique  The incision was then dressed with Adaptic, Dry sterile dressing  The pneumatic ankle tourniquet was then deflated and a prompt hyperemic response was noted to all digits of the foot  The patient tolerated the procedure and anesthesia well and was transferred to the PACU with vital signs stable           Patient Disposition:  hemodynamically stable      Dr Shahriar Steel was present during the entire procedure and participated in all key aspects    SIGNATURE: Kit Morris DPM  DATE: July 1, 2019  TIME: 1:56 PM

## 2019-07-01 NOTE — DISCHARGE INSTRUCTIONS
Post-Operative Instructions    1  Take your prescribed medication as directed  2  Upon arrival at home, lie down and elevate your surgical foot on 2 pillows  3  Remain quiet, off your feet as much as possible, for the first 24-48 hours  This is when your feet first swell and may become painful  After 48 hours you may begin limited walking following these restrictions:   Weightbear as tolerated to surgical foot  4  Drink large quantities of water  Consume no alcohol  Continue a well-balanced diet  5  Report any unusual discomfort or fever to this office  6  A limited amount of discomfort and swelling is to be expected  In some cases the skin may take on a bruised appearance  The surgical solution that was applied to your foot prior to the operation is dark in color and the operation site may appear to be oozing when it actually is not  7  A slight amount of blood is to be expected, and is no cause for alarm  Do not remove the dressings  If there is active bleeding and if the bleeding persists, add additional gauze to the bandage, apply direct pressure, elevate your feet and call this office  8  Do not get the dressings wet  As regular bathing may be inconvenient, sponge baths are recommended  9  When anesthesia wears off and if any discomfort should be present, apply an ice pack directly over the operated area for 15 minute intervals for several hours or until the pain leaves  (USE IN EXCESS OF 15 MINUTES COULD CAUSE FROSTBITE)  Do not use hot water bags or electric pads  A convenient icepack can be made by placing ice cubes in a plastic bag and covering this with a towel  10  If necessary, take a mild laxative before retiring  11  Wear your special open shoes anytime you put weight on your foot, even if it is just to walk to the bathroom and back  It will probably be 2 or 3 weeks before you will be permitted to try regular shoes    12  Having performed the operation, we are interested in a prompt recovery  Please cooperate by following the above instructions  13  Please call to confirm your post-op appointment or call with any other questions  Crutch Instructions   WHAT YOU NEED TO KNOW:   Crutches are tools that provide support and balance when you walk  You may need 1 or 2 crutches to help support your body weight  You may need crutches if you had surgery or an injury that affects your ability to walk  DISCHARGE INSTRUCTIONS:   How to use crutches safely:   · Support your weight with your arms and hands  Do not support your weight with your armpits  This could hurt the nerves that are in your underarms  Keep your elbow bent when the crutch is in place under your arm  · Walk slowly and carefully with crutches  Go up and down stairs and ramps slowly, and stop to rest when you feel tired  Get up slowly to a sitting or standing position  This will help prevent dizziness and fainting  Use your crutches only on firm ground  Use caution when you walk on ice or snow  Wet or waxed floors and smooth cement floors can be slippery  Watch out for small rugs or cords  How to walk with crutches:   · Place both crutches under your arms, and place your hands on the hand  of the crutches  Place your crutches slightly in front of you  · The top of the crutches should be about 2 fingers [id number] (about 1½ inches) below your armpits  Place your weight on your hands  The top of the crutches should not press into your armpits  · If you have one leg that is injured, keep it off the floor by bending your knee  · Lift the crutches and move them a step ahead of you  Put the rubber ends of the crutches firmly on the ground  Move the foot that is not injured between the crutches  Place that heel down first     · If you are using your crutches for balance, move your right foot and left crutch forward  Then move your left foot and right crutch forward  Keep walking this way         How to go up stairs with crutches:   · Face the stairs  Put the crutches close to the first step  · Push onto the crutches and put your uninjured leg on the first step  · Put your weight on your uninjured leg that is on the first step  Bring both crutches and the injured leg onto the step at the same time  · When you hold onto a railing with one arm, put both crutches under the other arm  Use the railing to help you go up stairs  How to go down stairs with crutches:   · Stand with the toes of your uninjured leg close to the edge of the step  · Bend the knee of your uninjured leg  Slowly lower both crutches along with the injured leg onto the next step  · Lean on your crutches  Slowly lower your uninjured leg onto the same step  · Place both crutches under one arm while you hold onto the railing with the other arm  How to sit in a chair with crutches:   · Turn and back up to the chair until you feel the edge of it against the back of your legs  Keep your injured leg forward  · Take your crutches out from under your arms  Sit while bending your uninjured knee  How to get up from a chair with crutches:   · Sit on the edge of your chair  · Push up with your hands using the crutches or arms of the chair  Put your weight on your uninjured foot as you get up  · Keep your injured leg bent at the knee and off the floor  Contact your healthcare provider if:   · Your crutches do not fit  · One crutch is longer than the other  · Your crutches break or get lost     · The rubber tips of your crutches are split or loose  · You get blisters or painful calluses on your hands or armpits  · Your armpit is red, sore, or has bumps or pimples  · Your arm muscles get weaker the longer you use the crutches  · You have questions or concerns about your condition or care  Return to the emergency department if:   · You have sudden numbness in a hand or arm      · Your fingers feel cold or have cramping pain  © 2017 [facility] Information is for End User's use only and may not be sold, redistributed or otherwise used for commercial purposes  All illustrations and images included in CareNotes® are the copyrighted property of A D [employer] M [employer] Inc  or Everett Murphy  The above information is an  only  It is not intended as medical advice for individual conditions or treatments  Talk to your doctor, nurse or pharmacist before following any medical regimen to see if it is safe and effective for you

## 2019-07-02 ENCOUNTER — TELEPHONE (OUTPATIENT)
Dept: PODIATRY | Facility: CLINIC | Age: 59
End: 2019-07-02

## 2019-07-02 NOTE — TELEPHONE ENCOUNTER
Patient called in as she is in extreme pain from her surgery performed yesterday, and she was not able to sleep last night  I contacted Dr Tiburcio Akhtar and he said for the patient to remove the ace bandage but to keep the gauze on  He reiterated ice and elevation, and for the patient to take ibuprofen 400 mg every time she takes a percocet   I then explained since she is only one day post op that pain is to be expected and is normal

## 2019-07-03 ENCOUNTER — TELEPHONE (OUTPATIENT)
Dept: PODIATRY | Facility: CLINIC | Age: 59
End: 2019-07-03

## 2019-07-03 ENCOUNTER — OFFICE VISIT (OUTPATIENT)
Dept: PODIATRY | Facility: CLINIC | Age: 59
End: 2019-07-03

## 2019-07-03 VITALS
SYSTOLIC BLOOD PRESSURE: 132 MMHG | HEIGHT: 67 IN | WEIGHT: 190 LBS | BODY MASS INDEX: 29.82 KG/M2 | DIASTOLIC BLOOD PRESSURE: 64 MMHG

## 2019-07-03 DIAGNOSIS — F31.81 BIPOLAR 2 DISORDER (HCC): Chronic | ICD-10-CM

## 2019-07-03 DIAGNOSIS — M21.612 BUNION OF GREAT TOE OF LEFT FOOT: Primary | ICD-10-CM

## 2019-07-03 PROCEDURE — 99024 POSTOP FOLLOW-UP VISIT: CPT | Performed by: PODIATRIST

## 2019-07-03 NOTE — PROGRESS NOTES
PATIENT:  Nicole Foster      1960    ASSESSMENT     1  Bunion of great toe of left foot            PLAN  Dressed was changed with betadine/ DSD/ ACE wrap  No active bleeding at this time  She concerns for pain and cannot sleep at night  Instructed more elevation and NWB to reduce post-op pain and swelling  She may try the pain med every 3 hours instead of 4 hours if needed  I did not recommend NSAIDs due to chronic renal disease  Continue post-op care as instructed  Call if any increase in pain, fevers, calf pain, shortness of breath, or general distress is noted  Patient instructed to go to ER if call is not returned immediately  Pt to return next week to see Dr Adrian Naqvi  HISTORY OF PRESENT ILLNESS  Patient presents for bleeding on the dressing  S/P bunion repair on Monday  Dressing was saturated with blood  She continues to have post-op pain  Medication helps her for about 2 hours  She has been putting some weight on left foot with crutches  REVIEW OF SYSTEMS  Patient denied CP, SOB, fever, chills, palpatation, HA, GI problem, or calf pain  PHYSICAL EXAMINATION  GENERAL  The patient appears in NAD / non-toxic  Afebrile  VSS    VASCULAR EXAM  Pedal pulses and vascular status are intact  No calf pain or edema bilaterally  No cyanosis  DERMATOLOGIC EXAM  Incision is coapted without active bleeding  No signs of infection  Normal post-op edema and ecchymosis  No necrosis, dehiscence  NEUROLOGIC EXAM  AAO X 3  No focal neurologic deficit  Neurologic status is intact BLE  MUSCULOSKELETAL EXAM  Good surgical correction  Normal post-op findings  ROM intact  No fluctuation or crepitus

## 2019-07-04 RX ORDER — CLONAZEPAM 0.5 MG/1
0.5 TABLET ORAL 3 TIMES DAILY
Qty: 21 TABLET | Refills: 0 | Status: SHIPPED | OUTPATIENT
Start: 2019-07-04 | End: 2019-09-02 | Stop reason: SDUPTHER

## 2019-07-08 ENCOUNTER — OFFICE VISIT (OUTPATIENT)
Dept: PODIATRY | Facility: CLINIC | Age: 59
End: 2019-07-08

## 2019-07-08 VITALS
WEIGHT: 190 LBS | DIASTOLIC BLOOD PRESSURE: 82 MMHG | HEIGHT: 67 IN | SYSTOLIC BLOOD PRESSURE: 159 MMHG | BODY MASS INDEX: 29.82 KG/M2 | HEART RATE: 106 BPM

## 2019-07-08 DIAGNOSIS — Z98.890 POSTOPERATIVE STATE: Primary | ICD-10-CM

## 2019-07-08 PROCEDURE — 99024 POSTOP FOLLOW-UP VISIT: CPT | Performed by: PODIATRIST

## 2019-07-08 NOTE — PROGRESS NOTES
Assessment:  1  Post-op bunion    Plan:  1  Sutures left intact  2  Patient to use crutches PRN pain  3  Patient to keep dressings clean, dry and intact until next visit for suture removal   4  Elevate, ice PRN pain  Problem List Items Addressed This Visit        Other    Postoperative state - Primary             Diagnoses and all orders for this visit:    Postoperative state          Subjective:      Patient ID: Yudelka Gómez is a 64 y o  female  Marybel Fat is here for a post-op visit  She states she has a 10/10 pain at this time, but laughs and seems to be in wonderful spirits throughout the visit  The following portions of the patient's history were reviewed and updated as appropriate: allergies, current medications, past family history, past medical history, past social history, past surgical history and problem list     Review of Systems   Constitutional: Negative  Objective:      /82   Pulse (!) 106   Ht 5' 7" (1 702 m)   Wt 86 2 kg (190 lb)   LMP  (LMP Unknown)   BMI 29 76 kg/m²          Physical Exam   Constitutional: She appears well-developed and well-nourished  No distress  Musculoskeletal: She exhibits edema  She exhibits no tenderness  Calf is soft and non-tender without cords  Skin: She is not diaphoretic  The surgical incision is coapted without dehiscence, necrosis nor active drainage  Sutures intact  Good deformity correction noted  Vascular status intact to all toes  Mild edema noted  No signs of infection noted

## 2019-07-08 NOTE — PATIENT INSTRUCTIONS
Crutch Instructions   WHAT YOU NEED TO KNOW:   Crutches are tools that provide support and balance when you walk  You may need 1 or 2 crutches to help support your body weight  You may need crutches if you had surgery or an injury that affects your ability to walk  DISCHARGE INSTRUCTIONS:   How to use crutches safely:   · Support your weight with your arms and hands  Do not support your weight with your armpits  This could hurt the nerves that are in your underarms  Keep your elbow bent when the crutch is in place under your arm  · Walk slowly and carefully with crutches  Go up and down stairs and ramps slowly, and stop to rest when you feel tired  Get up slowly to a sitting or standing position  This will help prevent dizziness and fainting  Use your crutches only on firm ground  Use caution when you walk on ice or snow  Wet or waxed floors and smooth cement floors can be slippery  Watch out for small rugs or cords  How to walk with crutches:   · Place both crutches under your arms, and place your hands on the hand  of the crutches  Place your crutches slightly in front of you  · The top of the crutches should be about 2 fingers [id number] (about 1½ inches) below your armpits  Place your weight on your hands  The top of the crutches should not press into your armpits  · If you have one leg that is injured, keep it off the floor by bending your knee  · Lift the crutches and move them a step ahead of you  Put the rubber ends of the crutches firmly on the ground  Move the foot that is not injured between the crutches  Place that heel down first     · If you are using your crutches for balance, move your right foot and left crutch forward  Then move your left foot and right crutch forward  Keep walking this way  How to go up stairs with crutches:   · Face the stairs  Put the crutches close to the first step  · Push onto the crutches and put your uninjured leg on the first step      · Put your weight on your uninjured leg that is on the first step  Bring both crutches and the injured leg onto the step at the same time  · When you hold onto a railing with one arm, put both crutches under the other arm  Use the railing to help you go up stairs  How to go down stairs with crutches:   · Stand with the toes of your uninjured leg close to the edge of the step  · Bend the knee of your uninjured leg  Slowly lower both crutches along with the injured leg onto the next step  · Lean on your crutches  Slowly lower your uninjured leg onto the same step  · Place both crutches under one arm while you hold onto the railing with the other arm  How to sit in a chair with crutches:   · Turn and back up to the chair until you feel the edge of it against the back of your legs  Keep your injured leg forward  · Take your crutches out from under your arms  Sit while bending your uninjured knee  How to get up from a chair with crutches:   · Sit on the edge of your chair  · Push up with your hands using the crutches or arms of the chair  Put your weight on your uninjured foot as you get up  · Keep your injured leg bent at the knee and off the floor  Contact your healthcare provider if:   · Your crutches do not fit  · One crutch is longer than the other  · Your crutches break or get lost     · The rubber tips of your crutches are split or loose  · You get blisters or painful calluses on your hands or armpits  · Your armpit is red, sore, or has bumps or pimples  · Your arm muscles get weaker the longer you use the crutches  · You have questions or concerns about your condition or care  Return to the emergency department if:   · You have sudden numbness in a hand or arm  · Your fingers feel cold or have cramping pain    © 2017 [employer] Information is for End User's use only and may not be sold, redistributed or otherwise used for commercial purposes  All illustrations and images included in CareNotes® are the copyrighted property of [employer]  or Everett [employer]  The above information is an  only  It is not intended as medical advice for individual conditions or treatments  Talk to your doctor, nurse or pharmacist before following any medical regimen to see if it is safe and effective for you  Crutch Instructions   WHAT YOU NEED TO KNOW:   Crutches are tools that provide support and balance when you walk  You may need 1 or 2 crutches to help support your body weight  You may need crutches if you had surgery or an injury that affects your ability to walk  DISCHARGE INSTRUCTIONS:   How to use crutches safely:   · Support your weight with your arms and hands  Do not support your weight with your armpits  This could hurt the nerves that are in your underarms  Keep your elbow bent when the crutch is in place under your arm  · Walk slowly and carefully with crutches  Go up and down stairs and ramps slowly, and stop to rest when you feel tired  Get up slowly to a sitting or standing position  This will help prevent dizziness and fainting  Use your crutches only on firm ground  Use caution when you walk on ice or snow  Wet or waxed floors and smooth cement floors can be slippery  Watch out for small rugs or cords  How to walk with crutches:   · Place both crutches under your arms, and place your hands on the hand  of the crutches  Place your crutches slightly in front of you  · The top of the crutches should be about 2 fingers [id number] (about 1½ inches) below your armpits  Place your weight on your hands  The top of the crutches should not press into your armpits  · If you have one leg that is injured, keep it off the floor by bending your knee  · Lift the crutches and move them a step ahead of you  Put the rubber ends of the crutches firmly on the ground   Move the foot that is not injured between the crutches  Place that heel down first     · If you are using your crutches for balance, move your right foot and left crutch forward  Then move your left foot and right crutch forward  Keep walking this way  How to go up stairs with crutches:   · Face the stairs  Put the crutches close to the first step  · Push onto the crutches and put your uninjured leg on the first step  · Put your weight on your uninjured leg that is on the first step  Bring both crutches and the injured leg onto the step at the same time  · When you hold onto a railing with one arm, put both crutches under the other arm  Use the railing to help you go up stairs  How to go down stairs with crutches:   · Stand with the toes of your uninjured leg close to the edge of the step  · Bend the knee of your uninjured leg  Slowly lower both crutches along with the injured leg onto the next step  · Lean on your crutches  Slowly lower your uninjured leg onto the same step  · Place both crutches under one arm while you hold onto the railing with the other arm  How to sit in a chair with crutches:   · Turn and back up to the chair until you feel the edge of it against the back of your legs  Keep your injured leg forward  · Take your crutches out from under your arms  Sit while bending your uninjured knee  How to get up from a chair with crutches:   · Sit on the edge of your chair  · Push up with your hands using the crutches or arms of the chair  Put your weight on your uninjured foot as you get up  · Keep your injured leg bent at the knee and off the floor  Contact your healthcare provider if:   · Your crutches do not fit  · One crutch is longer than the other  · Your crutches break or get lost     · The rubber tips of your crutches are split or loose  · You get blisters or painful calluses on your hands or armpits  · Your armpit is red, sore, or has bumps or pimples       · Your arm muscles get weaker the longer you use the crutches  · You have questions or concerns about your condition or care  Return to the emergency department if:   · You have sudden numbness in a hand or arm  · Your fingers feel cold or have cramping pain  © 2017 [address] [employer] Information is for End User's use only and may not be sold, redistributed or otherwise used for commercial purposes  All illustrations and images included in CareNotes® are the copyrighted property of [employer]  or Everett Murphy  The above information is an  only  It is not intended as medical advice for individual conditions or treatments  Talk to your doctor, nurse or pharmacist before following any medical regimen to see if it is safe and effective for you

## 2019-07-10 ENCOUNTER — TELEPHONE (OUTPATIENT)
Dept: FAMILY MEDICINE CLINIC | Facility: HOSPITAL | Age: 59
End: 2019-07-10

## 2019-07-12 DIAGNOSIS — M21.612 BUNION OF GREAT TOE OF LEFT FOOT: ICD-10-CM

## 2019-07-12 DIAGNOSIS — L30.9 DERMATITIS: ICD-10-CM

## 2019-07-12 DIAGNOSIS — J30.2 SEASONAL ALLERGIES: Primary | ICD-10-CM

## 2019-07-12 RX ORDER — MONTELUKAST SODIUM 10 MG/1
10 TABLET ORAL
Qty: 30 TABLET | Refills: 0 | Status: SHIPPED | OUTPATIENT
Start: 2019-07-12 | End: 2019-08-05 | Stop reason: SDUPTHER

## 2019-07-12 RX ORDER — CLOBETASOL PROPIONATE 0.5 MG/G
CREAM TOPICAL 2 TIMES DAILY
Qty: 30 G | Refills: 0 | Status: SHIPPED | OUTPATIENT
Start: 2019-07-12 | End: 2019-08-14 | Stop reason: ALTCHOICE

## 2019-07-12 RX ORDER — HYDROCODONE BITARTRATE AND ACETAMINOPHEN 5; 325 MG/1; MG/1
1 TABLET ORAL 2 TIMES DAILY PRN
Qty: 10 TABLET | Refills: 0 | Status: SHIPPED | OUTPATIENT
Start: 2019-07-12 | End: 2019-08-14 | Stop reason: ALTCHOICE

## 2019-07-12 RX ORDER — MONTELUKAST SODIUM 10 MG/1
10 TABLET ORAL
Qty: 30 TABLET | Refills: 2 | Status: SHIPPED | OUTPATIENT
Start: 2019-07-12 | End: 2019-07-12 | Stop reason: SDUPTHER

## 2019-07-12 NOTE — TELEPHONE ENCOUNTER
I  Discussed with pt and will start on singulair  she is also requesting pain med for her foot - had 4 days of meds from dr Nitesh Scanlon - will give additional 5 days of norco for pain bid no refills     also has need for refill of clobestesol with some flare of dermatitis

## 2019-07-16 ENCOUNTER — OFFICE VISIT (OUTPATIENT)
Dept: PODIATRY | Facility: CLINIC | Age: 59
End: 2019-07-16

## 2019-07-16 VITALS — HEIGHT: 67 IN | BODY MASS INDEX: 31.23 KG/M2 | WEIGHT: 199 LBS

## 2019-07-16 DIAGNOSIS — M21.612 BUNION OF GREAT TOE OF LEFT FOOT: Primary | ICD-10-CM

## 2019-07-16 PROCEDURE — 99024 POSTOP FOLLOW-UP VISIT: CPT | Performed by: PODIATRIST

## 2019-07-18 ENCOUNTER — TELEPHONE (OUTPATIENT)
Dept: NEPHROLOGY | Facility: CLINIC | Age: 59
End: 2019-07-18

## 2019-07-18 NOTE — PROGRESS NOTES
Assessment:  1  S/P Bunionectomy    Plan:  1  Sutures removed  2  Patient may shower foot  3  Patient instructed to remain in surgical shoe for all walking  4  Patient instructed to ice, elevate PRN edema, tenderness      Problem List Items Addressed This Visit        Musculoskeletal and Integument    Bunion of great toe of left foot - Primary             Diagnoses and all orders for this visit:    Bunion of great toe of left foot          Subjective:      Patient ID: Dona Mae is a 64 y o  female  Parke is here post-bunionectomy  She relates her pain level is now minimal       The following portions of the patient's history were reviewed and updated as appropriate: allergies, current medications, past family history, past medical history, past social history, past surgical history and problem list     Review of Systems   Constitutional: Negative  Objective:      Ht 5' 7" (1 702 m)   Wt 90 3 kg (199 lb)   LMP  (LMP Unknown)   BMI 31 17 kg/m²          Physical Exam   Constitutional: She appears well-developed and well-nourished  No distress  Skin: She is not diaphoretic  Nursing note and vitals reviewed  The incision is well-healed without signs of infection  No necrosis  Minimal edema  Good correction  Calf is soft and non-tender

## 2019-07-30 ENCOUNTER — HOSPITAL ENCOUNTER (OUTPATIENT)
Dept: ULTRASOUND IMAGING | Facility: HOSPITAL | Age: 59
Discharge: HOME | End: 2019-07-30
Payer: MEDICARE

## 2019-07-30 DIAGNOSIS — E04.2 MULTIPLE THYROID NODULES: ICD-10-CM

## 2019-07-30 PROCEDURE — 10005 FNA BX W/US GDN 1ST LES: CPT

## 2019-07-30 PROCEDURE — 88173 CYTOPATH EVAL FNA REPORT: CPT | Performed by: PATHOLOGY

## 2019-07-30 RX ORDER — LIDOCAINE HYDROCHLORIDE 10 MG/ML
5 INJECTION, SOLUTION EPIDURAL; INFILTRATION; INTRACAUDAL; PERINEURAL ONCE
Status: COMPLETED | OUTPATIENT
Start: 2019-07-30 | End: 2019-07-30

## 2019-07-30 RX ADMIN — LIDOCAINE HYDROCHLORIDE 5 ML: 10 INJECTION, SOLUTION EPIDURAL; INFILTRATION; INTRACAUDAL; PERINEURAL at 14:47

## 2019-07-31 DIAGNOSIS — K59.00 CONSTIPATION, UNSPECIFIED CONSTIPATION TYPE: ICD-10-CM

## 2019-08-02 NOTE — RESULT ENCOUNTER NOTE
Please call the patient regarding abnormal result  Initial results from pathology of fine-needle aspiration shows atypia of undetermined significance  Since it is indeterminate, we will wait for the results of affirma testing-this is normally available in approximately 14 days  Will contact with results

## 2019-08-02 NOTE — TELEPHONE ENCOUNTER
Pt asks if she can get samples of Linzess? States 30 days now $150 and 90 days $400 at [facility]  Provided 12 boxes of Linzess 290 samples    Pt will  Monday

## 2019-08-05 ENCOUNTER — OFFICE VISIT (OUTPATIENT)
Dept: PODIATRY | Facility: CLINIC | Age: 59
End: 2019-08-05

## 2019-08-05 VITALS
HEART RATE: 98 BPM | SYSTOLIC BLOOD PRESSURE: 118 MMHG | BODY MASS INDEX: 31.17 KG/M2 | HEIGHT: 67 IN | DIASTOLIC BLOOD PRESSURE: 78 MMHG

## 2019-08-05 DIAGNOSIS — M21.612 BUNION OF GREAT TOE OF LEFT FOOT: Primary | ICD-10-CM

## 2019-08-05 DIAGNOSIS — I10 ESSENTIAL HYPERTENSION: ICD-10-CM

## 2019-08-05 DIAGNOSIS — Z98.890 POSTOPERATIVE STATE: ICD-10-CM

## 2019-08-05 DIAGNOSIS — N25.81 SECONDARY RENAL HYPERPARATHYROIDISM (HCC): ICD-10-CM

## 2019-08-05 DIAGNOSIS — N28.1 RENAL CYST: ICD-10-CM

## 2019-08-05 DIAGNOSIS — J30.2 SEASONAL ALLERGIES: ICD-10-CM

## 2019-08-05 DIAGNOSIS — E23.2 DIABETES INSIPIDUS (HCC): ICD-10-CM

## 2019-08-05 DIAGNOSIS — N18.30 CHRONIC KIDNEY DISEASE, STAGE 3 (HCC): ICD-10-CM

## 2019-08-05 PROCEDURE — 99024 POSTOP FOLLOW-UP VISIT: CPT | Performed by: PODIATRIST

## 2019-08-05 RX ORDER — CARVEDILOL 3.12 MG/1
3.12 TABLET ORAL 2 TIMES DAILY WITH MEALS
Qty: 60 TABLET | Refills: 2 | Status: SHIPPED | OUTPATIENT
Start: 2019-08-05 | End: 2019-11-05 | Stop reason: SDUPTHER

## 2019-08-06 ENCOUNTER — HOSPITAL ENCOUNTER (OUTPATIENT)
Dept: RADIOLOGY | Facility: HOSPITAL | Age: 59
Discharge: HOME | End: 2019-08-06
Attending: PODIATRIST
Payer: MEDICARE

## 2019-08-06 DIAGNOSIS — M21.612 BUNION OF GREAT TOE OF LEFT FOOT: ICD-10-CM

## 2019-08-06 PROCEDURE — 73630 X-RAY EXAM OF FOOT: CPT

## 2019-08-06 RX ORDER — MONTELUKAST SODIUM 10 MG/1
TABLET ORAL
Qty: 30 TABLET | Refills: 11 | Status: SHIPPED | OUTPATIENT
Start: 2019-08-06 | End: 2019-11-13

## 2019-08-08 NOTE — PROGRESS NOTES
Assessment:  1  Post-op bunionectomy    Plan:  1  Continue in post-op shoe for all weightbearing  2  New x-rays ordered  3  Start PT    Problem List Items Addressed This Visit        Musculoskeletal and Integument    Bunion of great toe of left foot - Primary    Relevant Orders    X-ray foot left 3+ views    Ambulatory referral to Physical Therapy       Other    Postoperative state             Diagnoses and all orders for this visit:    Bunion of great toe of left foot  -     X-ray foot left 3+ views; Future  -     Ambulatory referral to Physical Therapy; Future    Postoperative state          Subjective:      Patient ID: Keshia Jones is a 64 y o  female  Ramines Brody is here for a post-op visit  She notes she fell in the interim once and bumped her foot  Overall, she states her foot isn't really painful and she just notes some edema  She is also walking at home without the post-op shoe AMA  The following portions of the patient's history were reviewed and updated as appropriate: allergies, current medications, past family history, past medical history, past social history, past surgical history and problem list     Review of Systems   Constitutional: Negative  Objective:      /78   Pulse 98   Ht 5' 7" (1 702 m)   LMP  (LMP Unknown)   BMI 31 17 kg/m²          Physical Exam   Constitutional: She appears well-developed and well-nourished  No distress  Skin: She is not diaphoretic  Nursing note and vitals reviewed  The foot is mildly swollen  No open wounds  No signs of infection  Good correction of the bunion noted

## 2019-08-12 ENCOUNTER — HOSPITAL ENCOUNTER (OUTPATIENT)
Dept: PULMONOLOGY | Facility: HOSPITAL | Age: 59
Discharge: HOME | End: 2019-08-12
Attending: INTERNAL MEDICINE
Payer: MEDICARE

## 2019-08-12 DIAGNOSIS — R93.89 ABNORMAL CHEST CT: ICD-10-CM

## 2019-08-12 PROCEDURE — 94060 EVALUATION OF WHEEZING: CPT | Performed by: INTERNAL MEDICINE

## 2019-08-12 PROCEDURE — 94060 EVALUATION OF WHEEZING: CPT

## 2019-08-12 PROCEDURE — 94726 PLETHYSMOGRAPHY LUNG VOLUMES: CPT

## 2019-08-12 PROCEDURE — 94729 DIFFUSING CAPACITY: CPT

## 2019-08-12 PROCEDURE — 94760 N-INVAS EAR/PLS OXIMETRY 1: CPT

## 2019-08-12 PROCEDURE — 94729 DIFFUSING CAPACITY: CPT | Performed by: INTERNAL MEDICINE

## 2019-08-12 PROCEDURE — 94726 PLETHYSMOGRAPHY LUNG VOLUMES: CPT | Performed by: INTERNAL MEDICINE

## 2019-08-12 RX ORDER — ALBUTEROL SULFATE 2.5 MG/3ML
2.5 SOLUTION RESPIRATORY (INHALATION) EVERY 6 HOURS PRN
Status: DISCONTINUED | OUTPATIENT
Start: 2019-08-12 | End: 2019-08-16 | Stop reason: HOSPADM

## 2019-08-12 RX ADMIN — ALBUTEROL SULFATE 2.5 MG: 2.5 SOLUTION RESPIRATORY (INHALATION) at 14:03

## 2019-08-14 ENCOUNTER — OFFICE VISIT (OUTPATIENT)
Dept: FAMILY MEDICINE CLINIC | Facility: HOSPITAL | Age: 59
End: 2019-08-14
Payer: MEDICARE

## 2019-08-14 VITALS
OXYGEN SATURATION: 95 % | HEART RATE: 104 BPM | BODY MASS INDEX: 31.8 KG/M2 | SYSTOLIC BLOOD PRESSURE: 106 MMHG | DIASTOLIC BLOOD PRESSURE: 72 MMHG | WEIGHT: 202.6 LBS | HEIGHT: 67 IN

## 2019-08-14 DIAGNOSIS — E21.3 HYPERPARATHYROIDISM (HCC): ICD-10-CM

## 2019-08-14 DIAGNOSIS — I63.9 INFARCTION OF LEFT BASAL GANGLIA (HCC): ICD-10-CM

## 2019-08-14 DIAGNOSIS — E66.9 OBESITY (BMI 30.0-34.9): ICD-10-CM

## 2019-08-14 DIAGNOSIS — S80.212A ABRASION OF LEFT KNEE, INITIAL ENCOUNTER: ICD-10-CM

## 2019-08-14 DIAGNOSIS — E78.00 HYPERCHOLESTEREMIA: Chronic | ICD-10-CM

## 2019-08-14 DIAGNOSIS — F31.4 BIPOLAR 1 DISORDER, DEPRESSED, SEVERE (HCC): ICD-10-CM

## 2019-08-14 DIAGNOSIS — Z23 NEED FOR TDAP VACCINATION: ICD-10-CM

## 2019-08-14 DIAGNOSIS — E22.1 HYPERPROLACTINEMIA (HCC): ICD-10-CM

## 2019-08-14 DIAGNOSIS — Z23 NEED FOR PNEUMOCOCCAL VACCINATION: ICD-10-CM

## 2019-08-14 DIAGNOSIS — N18.30 CHRONIC KIDNEY DISEASE, STAGE 3 (HCC): ICD-10-CM

## 2019-08-14 DIAGNOSIS — I10 ESSENTIAL HYPERTENSION: ICD-10-CM

## 2019-08-14 DIAGNOSIS — R06.02 SHORTNESS OF BREATH: ICD-10-CM

## 2019-08-14 DIAGNOSIS — Z00.00 MEDICARE ANNUAL WELLNESS VISIT, INITIAL: Primary | ICD-10-CM

## 2019-08-14 PROBLEM — E66.811 OBESITY (BMI 30.0-34.9): Status: ACTIVE | Noted: 2019-08-14

## 2019-08-14 PROBLEM — Z98.890 POSTOPERATIVE STATE: Status: RESOLVED | Noted: 2019-07-08 | Resolved: 2019-08-14

## 2019-08-14 PROBLEM — E04.1 THYROID NODULE: Status: ACTIVE | Noted: 2019-08-14

## 2019-08-14 PROCEDURE — G0438 PPPS, INITIAL VISIT: HCPCS | Performed by: INTERNAL MEDICINE

## 2019-08-14 PROCEDURE — 90670 PCV13 VACCINE IM: CPT | Performed by: INTERNAL MEDICINE

## 2019-08-14 PROCEDURE — 90471 IMMUNIZATION ADMIN: CPT | Performed by: INTERNAL MEDICINE

## 2019-08-14 PROCEDURE — G0009 ADMIN PNEUMOCOCCAL VACCINE: HCPCS | Performed by: INTERNAL MEDICINE

## 2019-08-14 PROCEDURE — 90715 TDAP VACCINE 7 YRS/> IM: CPT | Performed by: INTERNAL MEDICINE

## 2019-08-14 RX ORDER — FLUTICASONE FUROATE AND VILANTEROL 100; 25 UG/1; UG/1
1 POWDER RESPIRATORY (INHALATION) DAILY
Qty: 1 INHALER | Refills: 5 | Status: SHIPPED | OUTPATIENT
Start: 2019-08-14 | End: 2019-08-16 | Stop reason: ALTCHOICE

## 2019-08-14 RX ORDER — ASPIRIN 81 MG/1
81 TABLET ORAL DAILY
Qty: 30 TABLET | Refills: 0
Start: 2019-08-14 | End: 2021-09-27 | Stop reason: HOSPADM

## 2019-08-14 NOTE — PATIENT INSTRUCTIONS
5 wishes form    Will give tetanus and  prevnar 13 today  Obesity   AMBULATORY CARE:   Obesity  is when your body mass index (BMI) is greater than 30  Your healthcare provider will use your height and weight to measure your BMI  The risks of obesity include  many health problems, such as injuries or physical disability  You may need tests to check for the following:  · Diabetes     · High blood pressure or high cholesterol     · Heart disease     · Gallbladder or liver disease     · Cancer of the colon, breast, prostate, liver, or kidney     · Sleep apnea     · Arthritis or gout  Seek care immediately if:   · You have a severe headache, confusion, or difficulty speaking  · You have weakness on one side of your body  · You have chest pain, sweating, or shortness of breath  Contact your healthcare provider if:   · You have symptoms of gallbladder or liver disease, such as pain in your upper abdomen  · You have knee or hip pain and discomfort while walking  · You have symptoms of diabetes, such as intense hunger and thirst, and frequent urination  · You have symptoms of sleep apnea, such as snoring or daytime sleepiness  · You have questions or concerns about your condition or care  Treatment for obesity  focuses on helping you lose weight to improve your health  Even a small decrease in BMI can reduce the risk for many health problems  Your healthcare provider will help you set a weight-loss goal   · Lifestyle changes  are the first step in treating obesity  These include making healthy food choices and getting regular physical activity  Your healthcare provider may suggest a weight-loss program that involves coaching, education, and therapy  · Medicine  may help you lose weight when it is used with a healthy diet and physical activity  · Surgery  can help you lose weight if you are very obese and have other health problems  There are several types of weight-loss surgery   Ask your healthcare provider for more information  Be successful losing weight:   · Set small, realistic goals  An example of a small goal is to walk for 20 minutes 5 days a week  Anther goal is to lose 5% of your body weight  · Tell friends, family members, and coworkers about your goals  and ask for their support  Ask a friend to lose weight with you, or join a weight-loss support group  · Identify foods or triggers that may cause you to overeat , and find ways to avoid them  Remove tempting high-calorie foods from your home and workplace  Place a bowl of fresh fruit on your kitchen counter  If stress causes you to eat, then find other ways to cope with stress  · Keep a diary to track what you eat and drink  Also write down how many minutes of physical activity you do each day  Weigh yourself once a week and record it in your diary  Eating changes: You will need to eat 500 to 1,000 fewer calories each day than you currently eat to lose 1 to 2 pounds a week  The following changes will help you cut calories:  · Eat smaller portions  Use small plates, no larger than 9 inches in diameter  Fill your plate half full of fruits and vegetables  Measure your food using measuring cups until you know what a serving size looks like  · Eat 3 meals and 1 or 2 snacks each day  Plan your meals in advance  Kera Keep and eat at home most of the time  Eat slowly  · Eat fruits and vegetables at every meal   They are low in calories and high in fiber, which makes you feel full  Do not add butter, margarine, or cream sauce to vegetables  Use herbs to season steamed vegetables  · Eat less fat and fewer fried foods  Eat more baked or grilled chicken and fish  These protein sources are lower in calories and fat than red meat  Limit fast food  Dress your salads with olive oil and vinegar instead of bottled dressing  · Limit the amount of sugar you eat  Do not drink sugary beverages  Limit alcohol    Activity changes: Physical activity is good for your body in many ways  It helps you burn calories and build strong muscles  It decreases stress and depression, and improves your mood  It can also help you sleep better  Talk to your healthcare provider before you begin an exercise program   · Exercise for at least 30 minutes 5 days a week  Start slowly  Set aside time each day for physical activity that you enjoy and that is convenient for you  It is best to do both weight training and an activity that increases your heart rate, such as walking, bicycling, or swimming  · Find ways to be more active  Do yard work and housecleaning  Walk up the stairs instead of using elevators  Spend your leisure time going to events that require walking, such as outdoor festivals or fairs  This extra physical activity can help you lose weight and keep it off  Follow up with your healthcare provider as directed: You may need to meet with a dietitian  Write down your questions so you remember to ask them during your visits  © 2017 [address] Information is for End User's use only and may not be sold, redistributed or otherwise used for commercial purposes  All illustrations and images included in CareNotes® are the copyrighted property of [facility] [employer]  or Everett Murphy  The above information is an  only  It is not intended as medical advice for individual conditions or treatments  Talk to your doctor, nurse or pharmacist before following any medical regimen to see if it is safe and effective for you  Urinary Incontinence   WHAT YOU NEED TO KNOW:   What is urinary incontinence? Urinary incontinence (UI) is when you lose control of your bladder  What causes UI? UI occurs because your bladder cannot store or empty urine properly  The following are the most common types of UI:  · Stress incontinence  is when you leak urine due to increased bladder pressure   This may happen when you cough, sneeze, or exercise  · Urge incontinence  is when you feel the need to urinate right away and leak urine accidentally  · Mixed incontinence  is when you have both stress and urge UI  What are the signs and symptoms of UI?   · You feel like your bladder does not empty completely when you urinate  · You urinate often and need to urinate immediately  · You leak urine when you sleep, or you wake up with the urge to urinate  · You leak urine when you cough, sneeze, exercise, or laugh  How is UI diagnosed? Your healthcare provider will ask how often you leak urine and whether you have stress or urge symptoms  Tell him which medicines you take, how often you urinate, and how much liquid you drink each day  You may need any of the following tests:  · Urine tests  may show infection or kidney function  · A pelvic exam  may be done to check for blockages  A pelvic exam will also show if your bladder, uterus, or other organs have moved out of place  · An x-ray, ultrasound, or CT  may show problems with parts of your urinary system  You may be given contrast liquid to help your organs show up better in the pictures  Tell the healthcare provider if you have ever had an allergic reaction to contrast liquid  Do not enter the MRI room with anything metal  Metal can cause serious injury  Tell the healthcare provider if you have any metal in or on your body  · A bladder scan  will show how much urine is left in your bladder after you urinate  You will be asked to urinate and then healthcare providers will use a small ultrasound machine to check the urine left in your bladder  · Cystometry  is used to check the function of your urinary system  Your healthcare provider checks the pressure in your bladder while filling it with fluid  Your bladder pressure may also be tested when your bladder is full and while you urinate  How is UI treated?    · Medicines  can help strengthen your bladder control  · Electrical stimulation  is used to send a small amount of electrical energy to your pelvic floor muscles  This helps control your bladder function  Electrodes may be placed outside your body or in your rectum  For women, the electrodes may be placed in the vagina  · A bulking agent  may be injected into the wall of your urethra to make it thicker  This helps keep your urethra closed and decreases urine leakage  · Devices  such as a clamp, pessary, or tampon may help stop urine leaks  Ask your healthcare provider for more information about these and other devices  · Surgery  may be needed if other treatments do not work  Several types of surgery can help improve your bladder control  Ask your healthcare provider for more information about the surgery you may need  How can I manage my symptoms? · Do pelvic muscle exercises often  Your pelvic muscles help you stop urinating  Squeeze these muscles tight for 5 seconds, then relax for 5 seconds  Gradually work up to squeezing for 10 seconds  Do 3 sets of 15 repetitions a day, or as directed  This will help strengthen your pelvic muscles and improve bladder control  · A catheter  may be used to help empty your bladder  A catheter is a tiny, plastic tube that is put into your bladder to drain your urine  Your healthcare provider may tell you to use a catheter to prevent your bladder from getting too full and leaking urine  · Keep a UI record  Write down how often you leak urine and how much you leak  Make a note of what you were doing when you leaked urine  · Train your bladder  Go to the bathroom at set times, such as every 2 hours, even if you do not feel the urge to go  You can also try to hold your urine when you feel the urge to go  For example, hold your urine for 5 minutes when you feel the urge to go  As that becomes easier, hold your urine for 10 minutes  · Drink liquids as directed    Ask your healthcare provider how much liquid to drink each day and which liquids are best for you  You may need to limit the amount of liquid you drink to help control your urine leakage  Limit or do not have drinks that contain caffeine or alcohol  Do not drink any liquid right before you go to bed  · Prevent constipation  Eat a variety of high-fiber foods  Good examples are high-fiber cereals, beans, vegetables, and whole-grain breads  Prune juice may help make your bowel movement softer  Walking is the best way to trigger your intestines to have a bowel movement  · Exercise regularly and maintain a healthy weight  Ask your healthcare provider how much you should weigh and about the best exercise plan for you  Weight loss and exercise will decrease pressure on your bladder and help you control your leakage  Ask him to help you create a weight loss plan if you are overweight  When should I seek immediate care? · You have severe pain  · You are confused or cannot think clearly  When should I contact my healthcare provider? · You have a fever  · You see blood in your urine  · You have pain when you urinate  · You have new or worse pain, even after treatment  · Your mouth feels dry or you have vision changes  · Your urine is cloudy or smells bad  · You have questions or concerns about your condition or care  CARE AGREEMENT:   You have the right to help plan your care  Learn about your health condition and how it may be treated  Discuss treatment options with your caregivers to decide what care you want to receive  You always have the right to refuse treatment  The above information is an  only  It is not intended as medical advice for individual conditions or treatments  Talk to your doctor, nurse or pharmacist before following any medical regimen to see if it is safe and effective for you    © 2017 [employer] Information is for End User's use only and may not be sold, redistributed or otherwise used for commercial purposes  All illustrations and images included in CareNotes® are the copyrighted property of A D [employer] M [employer] Inc  or Everett Murphy  Cigarette Smoking and Your Health   AMBULATORY CARE:   Risks to your health if you smoke:  Nicotine and other chemicals found in tobacco damage every cell in your body  Even if you are a light smoker, you have an increased risk for cancer, heart disease, and lung disease  If you are pregnant or have diabetes, smoking increases your risk for complications  Benefits to your health if you stop smoking:   · You decrease respiratory symptoms such as coughing, wheezing, and shortness of breath  · You reduce your risk for cancers of the lung, mouth, throat, kidney, bladder, pancreas, stomach, and cervix  If you already have cancer, you increase the benefits of chemotherapy  You also reduce your risk for cancer returning or a second cancer from developing  · You reduce your risk for heart disease, blood clots, heart attack, and stroke  · You reduce your risk for lung infections, and diseases such as pneumonia, asthma, chronic bronchitis, and emphysema  · Your circulation improves  More oxygen can be delivered to your body  If you have diabetes, you lower your risk for complications, such as kidney, artery, and eye diseases  You also lower your risk for nerve damage  Nerve damage can lead to amputations, poor vision, and blindness  · You improve your body's ability to heal and to fight infections  Benefits to the health of others if you stop smoking:  Tobacco is harmful to nonsmokers who breathe in your secondhand smoke  The following are ways the health of others around you may improve when you stop smoking:  · You lower the risks for lung cancer and heart disease in nonsmoking adults  · If you are pregnant, you lower the risk for miscarriage, early delivery, low birth weight, and stillbirth   You also lower your baby's risk for SIDS, obesity, developmental delay, and neurobehavioral problems, such as ADHD  · If you have children, you lower their risk for ear infections, colds, pneumonia, bronchitis, and asthma  For more information and support to stop smoking:   · Smokefree  gov  Phone: 0- 840 - 286-8773  Web Address: [employer] smokefree  gov  Follow up with your healthcare provider as directed:  Write down your questions so you remember to ask them during your visits  © 2017 [address]  Information is for End User's use only and may not be sold, redistributed or otherwise used for commercial purposes  All illustrations and images included in CareNotes® are the copyrighted property of A D [employer] M , Inc  or Everett Murphy  The above information is an  only  It is not intended as medical advice for individual conditions or treatments  Talk to your doctor, nurse or pharmacist before following any medical regimen to see if it is safe and effective for you  Fall Prevention   AMBULATORY CARE:   Fall prevention  includes ways to make your home and other areas safer  It also includes ways you can move more carefully to prevent a fall  Health conditions that cause changes in your blood pressure, vision, or muscle strength and coordination may increase your risk for falls  Medicines may also increase your risk for falls if they make you dizzy, weak, or sleepy  Call 911 or have someone else call if:   · You have fallen and are unconscious  · You have fallen and cannot move part of your body  Contact your healthcare provider if:   · You have fallen and have pain or a headache  · You have questions or concerns about your condition or care  Fall prevention tips:   · Stand or sit up slowly  This may help you keep your balance and prevent falls  · Use assistive devices as directed  Your healthcare provider may suggest that you use a cane or walker to help you keep your balance   You may need to have grab bars put in your bathroom near the toilet or in the shower  · Wear shoes that fit well and have soles that   Wear shoes both inside and outside  Use slippers with good   Do not wear shoes with high heels  · Wear a personal alarm  This is a device that allows you to call 911 if you fall and need help  Ask your healthcare provider for more information  · Stay active  Exercise can help strengthen your muscles and improve your balance  Your healthcare provider may recommend water aerobics or walking  He or she may also recommend physical therapy to improve your coordination  Never start an exercise program without talking to your healthcare provider first      · Manage your medical conditions  Keep all appointments with your healthcare providers  Visit your eye doctor as directed  Home safety tips:   · Add items to prevent falls in the bathroom  Put nonslip strips on your bath or shower floor to prevent you from slipping  Use a bath mat if you do not have carpet in the bathroom  This will prevent you from falling when you step out of the bath or shower  Use a shower seat so you do not need to stand while you shower  Sit on the toilet or a chair in your bathroom to dry yourself and put on clothing  This will prevent you from losing your balance from drying or dressing yourself while you are standing  · Keep paths clear  Remove books, shoes, and other objects from walkways and stairs  Place cords for telephones and lamps out of the way so that you do not need to walk over them  Tape them down if you cannot move them  Remove small rugs  If you cannot remove a rug, secure it with double-sided tape  This will prevent you from tripping  · Install bright lights in your home  Use night lights to help light paths to the bathroom or kitchen  Always turn on the light before you start walking  · Keep items you use often on shelves within reach    Do not use a step stool to help you reach an item  · Paint or place reflective tape on the edges of your stairs  This will help you see the stairs better  Follow up with your healthcare provider as directed:  Write down your questions so you remember to ask them during your visits  © 2017 [address] Information is for End User's use only and may not be sold, redistributed or otherwise used for commercial purposes  All illustrations and images included in CareNotes® are the copyrighted property of A D [employer] M [employer] Inc  or Everett Murphy  The above information is an  only  It is not intended as medical advice for individual conditions or treatments  Talk to your doctor, nurse or pharmacist before following any medical regimen to see if it is safe and effective for you  Advance Directives   WHAT YOU NEED TO KNOW:   What are advance directives? Advance directives are legal documents that state your wishes and plans for medical care  These plans are made ahead of time in case you lose your ability to make decisions for yourself  Advance directives can apply to any medical decision, such as the treatments you want, and if you want to donate organs  What are the types of advance directives? There are many types of advance directives, and each state has rules about how to use them  You may choose a combination of any of the following:  · Living will: This is a written record of the treatment you want  You can also choose which treatments you do not want, which to limit, and which to stop at a certain time  This includes surgery, medicine, IV fluid, and tube feedings  · Durable power of  for healthcare [facility]): This is a written record that states who you want to make healthcare choices for you when you are unable to make them for yourself  This person, called a proxy, is usually a family member or a friend  You may choose more than 1 proxy      · Do not resuscitate (DNR) order:  A DNR order is used in case your heart stops beating or you stop breathing  It is a request not to have certain forms of treatment, such as CPR  A DNR order may be included in other types of advance directives  · Medical directive: This covers the care that you want if you are in a coma, near death, or unable to make decisions for yourself  You can list the treatments you want for each condition  Treatment may include pain medicine, surgery, blood transfusions, dialysis, IV or tube feedings, and a ventilator (breathing machine)  · Values history: This document has questions about your views, beliefs, and how you feel and think about life  This information can help others choose the care that you would choose  Why are advance directives important? An advance directive helps you control your care  Although spoken wishes may be used, it is better to have your wishes written down  Spoken wishes can be misunderstood, or not followed  Treatments may be given even if you do not want them  An advance directive may make it easier for your family to make difficult choices about your care  How do I decide what to put in my advance directives? · Make informed decisions:  Make sure you fully understand treatments or care you may receive  Think about the benefits and problems your decisions could cause for you or your family  Talk to healthcare providers if you have concerns or questions before you write down your wishes  You may also want to talk with your Jainism or , or a   Check your state laws to make sure that what you put in your advance directive is legal      · Sign all forms:  Sign and date your advance directive when you have finished  You may also need 2 witnesses to sign the forms  Witnesses cannot be your doctor or his staff, your spouse, heirs or beneficiaries, people you owe money to, or your chosen proxy  Talk to your family, proxy, and healthcare providers about your advance directive   Give each person a copy, and keep one for yourself in a place you can get to easily  Do not keep it hidden or locked away  · Review and revise your plans: You can revise your advance directive at any time, as long as you are able to make decisions  Review your plan every year, and when there are changes in your life, or your health  When you make changes, let your family, proxy, and healthcare providers know  Give each a new copy  Where can I find more information? · [employer]  [address] [place] , [place] [address]  Phone: 1- 286 - [phone]  Phone: 3- 330 - [phone]  Web Address: http://www  aafp org  · [address] [facility])  [address] [address] [facility], [address] [facility] , [address] [place] [address]  Phone: [phone]  Phone: [phone]  Web Address: Marine perez  CARE AGREEMENT:   You have the right to help plan your care  To help with this plan, you must learn about your health condition and treatment options  You must also learn about advance directives and how they are used  Work with your healthcare providers to decide what care will be used to treat you  You always have the right to refuse treatment  The above information is an  only  It is not intended as medical advice for individual conditions or treatments  Talk to your doctor, nurse or pharmacist before following any medical regimen to see if it is safe and effective for you  © 2017 [address] [facility] Information is for End User's use only and may not be sold, redistributed or otherwise used for commercial purposes  All illustrations and images included in CareNotes® are the copyrighted property of [employer]  or Everett Murphy  Obesity   AMBULATORY CARE:   Obesity  is when your body mass index (BMI) is greater than 30  Your healthcare provider will use your height and weight to measure your BMI    The risks of obesity include  many health problems, such as injuries or physical disability  You may need tests to check for the following:  · Diabetes     · High blood pressure or high cholesterol     · Heart disease     · Gallbladder or liver disease     · Cancer of the colon, breast, prostate, liver, or kidney     · Sleep apnea     · Arthritis or gout  Seek care immediately if:   · You have a severe headache, confusion, or difficulty speaking  · You have weakness on one side of your body  · You have chest pain, sweating, or shortness of breath  Contact your healthcare provider if:   · You have symptoms of gallbladder or liver disease, such as pain in your upper abdomen  · You have knee or hip pain and discomfort while walking  · You have symptoms of diabetes, such as intense hunger and thirst, and frequent urination  · You have symptoms of sleep apnea, such as snoring or daytime sleepiness  · You have questions or concerns about your condition or care  Treatment for obesity  focuses on helping you lose weight to improve your health  Even a small decrease in BMI can reduce the risk for many health problems  Your healthcare provider will help you set a weight-loss goal   · Lifestyle changes  are the first step in treating obesity  These include making healthy food choices and getting regular physical activity  Your healthcare provider may suggest a weight-loss program that involves coaching, education, and therapy  · Medicine  may help you lose weight when it is used with a healthy diet and physical activity  · Surgery  can help you lose weight if you are very obese and have other health problems  There are several types of weight-loss surgery  Ask your healthcare provider for more information  Be successful losing weight:   · Set small, realistic goals  An example of a small goal is to walk for 20 minutes 5 days a week  Anther goal is to lose 5% of your body weight      · Tell friends, family members, and coworkers about your goals  and ask for their support  Ask a friend to lose weight with you, or join a weight-loss support group  · Identify foods or triggers that may cause you to overeat , and find ways to avoid them  Remove tempting high-calorie foods from your home and workplace  Place a bowl of fresh fruit on your kitchen counter  If stress causes you to eat, then find other ways to cope with stress  · Keep a diary to track what you eat and drink  Also write down how many minutes of physical activity you do each day  Weigh yourself once a week and record it in your diary  Eating changes: You will need to eat 500 to 1,000 fewer calories each day than you currently eat to lose 1 to 2 pounds a week  The following changes will help you cut calories:  · Eat smaller portions  Use small plates, no larger than 9 inches in diameter  Fill your plate half full of fruits and vegetables  Measure your food using measuring cups until you know what a serving size looks like  · Eat 3 meals and 1 or 2 snacks each day  Plan your meals in advance  Beverly Naseem and eat at home most of the time  Eat slowly  · Eat fruits and vegetables at every meal   They are low in calories and high in fiber, which makes you feel full  Do not add butter, margarine, or cream sauce to vegetables  Use herbs to season steamed vegetables  · Eat less fat and fewer fried foods  Eat more baked or grilled chicken and fish  These protein sources are lower in calories and fat than red meat  Limit fast food  Dress your salads with olive oil and vinegar instead of bottled dressing  · Limit the amount of sugar you eat  Do not drink sugary beverages  Limit alcohol  Activity changes:  Physical activity is good for your body in many ways  It helps you burn calories and build strong muscles  It decreases stress and depression, and improves your mood  It can also help you sleep better   Talk to your healthcare provider before you begin an exercise program   · Exercise for at least 30 minutes 5 days a week  Start slowly  Set aside time each day for physical activity that you enjoy and that is convenient for you  It is best to do both weight training and an activity that increases your heart rate, such as walking, bicycling, or swimming  · Find ways to be more active  Do yard work and housecleaning  Walk up the stairs instead of using elevators  Spend your leisure time going to events that require walking, such as outdoor festivals or fairs  This extra physical activity can help you lose weight and keep it off  Follow up with your healthcare provider as directed: You may need to meet with a dietitian  Write down your questions so you remember to ask them during your visits  © 2017 [employer]  Information is for End User's use only and may not be sold, redistributed or otherwise used for commercial purposes  All illustrations and images included in CareNotes® are the copyrighted property of A [employer] M [employer]  or Everett Murphy  The above information is an  only  It is not intended as medical advice for individual conditions or treatments  Talk to your doctor, nurse or pharmacist before following any medical regimen to see if it is safe and effective for you

## 2019-08-14 NOTE — PROGRESS NOTES
Assessment and Plan:     Problem List Items Addressed This Visit        Endocrine    Hyperprolactinemia ([facility] [address] )     Seeing Dr Kezia Saul         Hyperparathyroidism [facility])     Seeing dr Rena Marshall            Cardiovascular and Mediastinum    Essential hypertension    Relevant Orders    Lipid Panel with Direct LDL reflex    Infarction of left basal ganglia (HCC)     Place on aspirin 81 mg daily         Relevant Medications    aspirin (ECOTRIN LOW STRENGTH) 81 mg EC tablet       Genitourinary    Chronic kidney disease, stage 3 (HCC)       Other    Hypercholesteremia (Chronic)    Bipolar 1 disorder, depressed, severe (HCC)    Shortness of breath    Relevant Medications    fluticasone-vilanterol (BREO ELLIPTA) 100-25 mcg/inh inhaler    Obesity (BMI 30 0-34 9)      Other Visit Diagnoses     Medicare annual wellness visit, initial    -  Primary    Need for pneumococcal vaccination        Relevant Orders    PNEUMOCOCCAL CONJUGATE VACCINE 13-VALENT GREATER THAN 6 MONTHS (Completed)    Abrasion of left knee, initial encounter             History of Present Illness:     Patient presents for Welcome to Medicare visit  Patient Care Team:  Rosa Whitley DO as PCP - General (Internal Medicine)  Vidhya Messina MD as PCP - Endocrinology (Endocrinology)  [employer], MD Perlita Mari MD as Endoscopist     Review of Systems:     Review of Systems   HENT: Negative for congestion  Gastrointestinal: Negative for bowel incontinence  Musculoskeletal: Negative for back pain  Opal Alfredo onto left knee with the post op shoe making her trip   Psychiatric/Behavioral: The patient is nervous/anxious           Problem List:     Patient Active Problem List   Diagnosis    Hypercholesteremia    Spondylolisthesis at L5-S1 level    Chronic kidney disease, stage 3 ([facility] [address] )    Renal cyst    Vitamin D deficiency    Secondary renal hyperparathyroidism ([facility] [address] )    Herniated nucleus pulposus, L5-S1    Primary osteoarthritis of right knee    Age related osteoporosis    Cardiac murmur    Rectal prolapse    Elevated LFTs    Weight gain    Essential hypertension    Hyperprolactinemia ([address] )    Screening for breast cancer    Bipolar 1 disorder, depressed, severe (HCC)    Obsessive compulsive disorder    Panic disorder with agoraphobia    Eating disorder    Hyperparathyroidism ([address] )    Shortness of breath    Pericardial effusion    Benign neoplasm of colon    Drug-induced constipation    Infarction of left basal ganglia (HCC)    Contusion of right knee    Bunion of great toe of left foot    Abnormal chest CT    Hyperphosphatemia    Abnormal thyroid exam    Obesity (BMI 30 0-34  9)    Thyroid nodule      Past Medical and Surgical History:     Past Medical History:   Diagnosis Date    Ankle sprain     left    Anxiety disorder     Bipolar 2 disorder (HCC)     Chronic back pain     CKD (chronic kidney disease) stage 3, GFR 30-59 ml/min ([facility])     CVA (cerebral vascular accident) ([address] )     noted on MRI in the past    Depression     GERD (gastroesophageal reflux disease)     Hypercholesteremia     Hyperlipidemia     Hypernatremia     Hypertension     Hypokalemia     Intervertebral disc disorder with radiculopathy of lumbosacral region     resolved: 2015    Panic attacks     Pericardial effusion     PONV (postoperative nausea and vomiting)     Radiculitis     resolved: 2015    Secondary renal hyperparathyroidism ([address] )     Vitamin D deficiency      Past Surgical History:   Procedure Laterality Date    BUNIONECTOMY      Left foot     COLON SURGERY      COLONOSCOPY  2018    DILATION AND CURETTAGE OF UTERUS      INDUCED       surgically induced    SD CORRJ HALLUX VALGUS W/SESMDC W/DIST METAR OSTEOT Left 2019    Procedure: Breanna Fernandez;  Surgeon: Juana Rinne, DPM;  Location:  MAIN OR;  Service: Podiatry    SD ERCP DX COLLECTION SPECIMEN BRUSHING/WASHING N/A 4/11/2018    Procedure: ENDOSCOPIC RETROGRADE CHOLANGIOPANCREATOGRAPHY (ERCP);   Surgeon: Cesia Weinstein MD;  Location: [facility];  Service: Gastroenterology    MO LAP, SURG PROCTOPEXY N/A 7/13/2018    Procedure: ROBOTIC SIGMOID RESECTION / RECTOPEXY;  Surgeon: Sylvain Bryant MD;  Location: [facility];  Service: Colorectal    MO SIGMOIDOSCOPY FLX DX W/COLLJ SPEC BR/WA IF PFRMD N/A 7/13/2018    Procedure: Lex Baca;  Surgeon: Sylvain Bryant MD;  [facility]: [facility];  Service: Colorectal    [facility] THYROID BIOPSY  7/30/2019      Family History:     Family History   Problem Relation Age of Onset    Bipolar disorder Mother     Mental illness Mother         depression    Stroke Mother     Dementia Mother     Heart disease Father     Hypertension Father     Other Family         Back disorder    Diabetes Family     Heart disease Family     Hypertension Family     Breast cancer Family     Stroke Family     Thyroid disease Family     Breast cancer Paternal Grandmother     Breast cancer Paternal [de-identified]     Breast cancer Maternal Aunt     Mental illness Sister     Mental illness Sister     Heart disease Sister     No Known Problems Sister     No Known Problems Sister     Other Son         pituitary tumor    Hypertension Son     Obesity Son     No Known Problems Son     Substance Abuse Neg Hx         neg fam hx      Social History:     Social History     Tobacco Use   Smoking Status Never Smoker   Smokeless Tobacco Never Used     Social History     Substance and Sexual Activity   Alcohol Use Never    Frequency: Never    Binge frequency: Never     Social History     Substance and Sexual Activity   Drug Use Yes    Types: Marijuana    Comment: ex marijuana user many years ago, occasional cocaine in the past       Medications and Allergies:     Current Outpatient Medications   Medication Sig Dispense Refill    acetaminophen (TYLENOL) 325 mg tablet Take 650 mg by mouth every 6 (six) hours as needed for mild pain      AMILoride 5 mg tablet 1 bid      amLODIPine (NORVASC) 5 mg tablet Take 1 tablet (5 mg total) by mouth 2 (two) times a day 180 tablet 3    atorvastatin (LIPITOR) 40 mg tablet Take 0 5 tablets (20 mg total) by mouth daily 30 tablet 3    calcium acetate (PHOSLO) 667 mg capsule Take 1 capsule (667 mg total) by mouth 3 (three) times a day with meals 90 capsule 3    carvedilol (COREG) 3 125 mg tablet Take 1 tablet (3 125 mg total) by mouth 2 (two) times a day with meals 60 tablet 2    clonazePAM (KLONOPIN) 0 5 mg tablet Take 1 tablet (0 5 mg total) by mouth 3 (three) times a day for 7 days 21 tablet 0    fluvoxaMINE (LUVOX) 100 mg tablet Take 100 mg by mouth 3 (three) times a day        lamoTRIgine (LaMICtal) 100 mg tablet Take 100 mg by mouth 3 (three) times a day Pt takes 3 times daily      linaCLOtide (LINZESS) 290 MCG CAPS Take 1 capsule by mouth daily for 90 days 90 capsule 3    montelukast (SINGULAIR) 10 mg tablet TAKE 1 TABLET BY MOUTH AT BEDTIME 30 tablet 11    omeprazole (PriLOSEC) 40 MG capsule TAKE 1 CAPSULE BY MOUTH  DAILY AT BEDTIME 90 capsule 3    Polyethylene Glycol 3350 (MIRALAX PO) Take by mouth as needed       psyllium (METAMUCIL) 58 6 % powder Take 1 packet by mouth daily      QUEtiapine (SEROQUEL) 300 mg tablet Take 1 tablet (300 mg total) by mouth every morning 1 in the AM     ( also takes 400 mg at bedtime) 90 tablet 3    QUEtiapine (SEROquel) 400 MG tablet 1 at bedtime      aspirin (ECOTRIN LOW STRENGTH) 81 mg EC tablet Take 1 tablet (81 mg total) by mouth daily 30 tablet 0    fluticasone-vilanterol (BREO ELLIPTA) 100-25 mcg/inh inhaler Inhale 1 puff daily Rinse mouth after use  1 Inhaler 5     No current facility-administered medications for this visit        Facility-Administered Medications Ordered in Other Visits   Medication Dose Route Frequency Provider Last Rate Last Dose    albuterol inhalation solution 2 5 mg  2 5 mg Nebulization Q6H PRN Megha Older, DO   2 5 mg at 08/12/19 1403     No Known Allergies   Immunizations:     Immunization History   Administered Date(s) Administered    Influenza, recombinant, quadrivalent,injectable, preservative free 01/02/2019    Pneumococcal Conjugate 13-Valent 08/14/2019      Medicare Screening Tests and Risk Assessments:     Raymundo Marroquin is here for her Initial Wellness visit  Health Risk Assessment:  Patient rates overall health as good  Patient feels that their physical health rating is Same  Eyesight was rated as Same  Hearing was rated as Same  Patient feels that their emotional and mental health rating is Same  Pain experienced by patient in the last 7 days has been Some  Patient's pain rating has been 5/10  Emotional/Mental Health:  Patient has been feeling nervous/anxious  PHQ-9 Depression Screening:    Frequency of the following problems over the past two weeks:      1  Little interest or pleasure in doing things: 0 - not at all      2  Feeling down, depressed, or hopeless: 0 - not at all  PHQ-2 Score: 0          Broken Bones/Falls: Fall Risk Assessment:    In the past year, patient has experienced: History of falling in past year    Number of falls: 2 or more    Injured during fall: Yes     Patient feels unsteady when standing or walking     Patient is worried about falling     Bladder/Bowel:  Patient has leaked urine accidently in the last six months  Patient reports no loss of bowel control  Immunizations:  Patient has had a flu vaccination within the last year  Patient has not received a pneumonia shot  Patient has not received a shingles shot  Home Safety:  Patient does not have trouble with stairs inside or outside of their home  Patient currently reports that there are no safety hazards present in home, working smoke alarms, working carbon monoxide detectors        Preventative Screenings:   Breast cancer screening performed, colon cancer screen completed, cholesterol screen completed, glaucoma eye exam completed,     Nutrition:  Current diet: Regular and Frequent junk food with servings of the following:  (Additional Comments: Doesn't eat until dinner , eats at night cereal and peanut butter )    Medications:  Patient is currently taking over-the-counter supplements  Patient is able to manage medications  Lifestyle Choices:  Patient reports no tobacco use  Patient reports no alcohol use  Patient drives a vehicle  Patient wears seat belt  Current level of exercise of physical activity described by patient as: had surgery 7/1/19 not doing much   Activities of Daily Living:  Can get out of bed by his or her self, able to dress self, able to make own meals, able to do own shopping, able to bathe self, can do own laundry/housekeeping, can manage own money, pay bills and track expenses    Previous Hospitalizations:  Hospitalization or ED visit in past 12 months  Number of hospitalizations within the last year: 1-2  Additional Comments: Bunion removed 7/1/2019    Advanced Directives:  Patient has not decided on power of   Patient has not completed advanced directive  Preventative Screening/Counseling:      Cardiovascular:      General: Risks and Benefits Discussed      Counseling: Healthy Diet and Healthy Weight     Due for Labs/Analytes/Optional EKG: Lipid Panel      Comments: Last done in  January with total at 222        Diabetes:      General: Risks and Benefits Discussed and Screening Current          Colorectal Cancer:      General: Risks and Benefits Discussed and Screening Current      Comments: Dr Maurice Lopez- to be done again in 1 year        Breast Cancer:      General: Risks and Benefits Discussed and Screening Current          Cervical Cancer:      General: Risks and Benefits Discussed      Comments:  Will do   pap with next appt- menopausal since prolactin issues- menopausal after labs confirmed this with dr Ailin Downs- stopped menses about age 45        Osteoporosis:      General: Risks and Benefits Discussed          AAA:      General: Risks and Benefits Discussed and Screening Not Indicated          Glaucoma:      General: Risks and Benefits Discussed and Screening Current      Comments: No elevated pressures- mother had glaucoma        HIV:      General: Risks and Benefits Discussed and Screening Not Indicated          Hepatitis C:      General: Risks and Benefits Discussed and Screening Current        Advanced Directives:   Patient has no living will for healthcare, Information on ACP and/or AD not provided  5 wishes given  End of life assessment reviewed with patient  Immunizations:      Influenza: Risks & Benefits Discussed and Influenza Recommended Annually      Pneumococcal: Risks & Benefits Discussed and Pneumococcal Due Today      Shingrix: Risks & Benefits Discussed      TDAP: Risks & Benefits Discussed and Tdap Vaccine Needed Today          No exam data present     Physical Exam:     /72   Pulse 104   Ht 5' 7" (1 702 m)   Wt 91 9 kg (202 lb 9 6 oz)   LMP  (LMP Unknown)   SpO2 95%   BMI 31 73 kg/m²     Physical Exam   Constitutional: She is oriented to person, place, and time  She appears well-developed and well-nourished  No distress  HENT:   Head: Normocephalic and atraumatic  Eyes: Conjunctivae and EOM are normal  Right eye exhibits no discharge  Left eye exhibits no discharge  No scleral icterus  Neck: No thyromegaly present  Cardiovascular: Normal rate, regular rhythm and normal heart sounds  Pulmonary/Chest: No stridor  No respiratory distress  She has wheezes  She has no rales  She exhibits no tenderness  minimal end exp wheezes   Abdominal: Soft  Bowel sounds are normal  She exhibits no distension  There is no tenderness  Musculoskeletal: Normal range of motion  She exhibits tenderness  She exhibits no edema or deformity     Left foot boot inplace from recnt surgery with Dr Fernandez Orf- incision healing well   left knee with multiple abrasions- no surrounding erythema   Lymphadenopathy:     She has no cervical adenopathy  Neurological: She is alert and oriented to person, place, and time  Skin: She is not diaphoretic  Nursing note and vitals reviewed  BMI Counseling: Body mass index is 31 73 kg/m²  Discussed the patient's BMI with her  The BMI is above average  BMI counseling and education was provided to the patient  Nutrition recommendations include 3-5 servings of fruits/vegetables daily   discussed using protein snack in Pm

## 2019-08-15 ENCOUNTER — TELEPHONE (OUTPATIENT)
Dept: PULMONOLOGY | Facility: CLINIC | Age: 59
End: 2019-08-15

## 2019-08-16 ENCOUNTER — EVALUATION (OUTPATIENT)
Dept: PHYSICAL THERAPY | Facility: CLINIC | Age: 59
End: 2019-08-16
Payer: MEDICARE

## 2019-08-16 ENCOUNTER — TELEPHONE (OUTPATIENT)
Dept: PULMONOLOGY | Facility: HOSPITAL | Age: 59
End: 2019-08-16

## 2019-08-16 DIAGNOSIS — R06.02 SOB (SHORTNESS OF BREATH): Primary | ICD-10-CM

## 2019-08-16 DIAGNOSIS — M21.612 BUNION OF GREAT TOE OF LEFT FOOT: Primary | ICD-10-CM

## 2019-08-16 DIAGNOSIS — Z98.890 S/P BUNIONECTOMY: ICD-10-CM

## 2019-08-16 PROCEDURE — 97162 PT EVAL MOD COMPLEX 30 MIN: CPT | Performed by: PHYSICAL THERAPIST

## 2019-08-16 RX ORDER — BUDESONIDE AND FORMOTEROL FUMARATE DIHYDRATE 160; 4.5 UG/1; UG/1
2 AEROSOL RESPIRATORY (INHALATION) 2 TIMES DAILY
Qty: 1 INHALER | Refills: 0 | Status: SHIPPED | COMMUNITY
Start: 2019-08-16 | End: 2019-08-27 | Stop reason: SDUPTHER

## 2019-08-16 NOTE — PROGRESS NOTES
Patient underwent pulmonary function testing  No evidence of obstruction or restriction  There is evidence of air trapping on Lung volumes  Diffusion capacity is mildly reduced  I spoke with patient  She is having daily symptoms of wheezing  She was given a prescription for [place], but the co-pay was too expensive  We will try getting her samples to see if it is beneficial and she will follow up with me in the office in the next 2-3 weeks  Patient agrees with this plan

## 2019-08-16 NOTE — PROGRESS NOTES
PT Evaluation     Today's date: 2019  Patient name: Renny Lozano  : 1960  MRN: [id number]  Referring provider: Salud Webster DPM  Dx:   Encounter Diagnosis     ICD-10-CM    1  Bunion of great toe of left foot M21 612 Ambulatory referral to Physical Therapy   2  S/P bunionectomy Z98 890             Pt treated by RADHA, CORDELL under direct supervision of BENSON TODD         Assessment  Assessment details: Yobany Ziegler is a 64 y o female presenting as an outpatient to [facility] s/p L bunionectomy on 2019  Pt presents w/ pain, decreased ROM, decreased strength, activity/weight bearing intolerance, paraesthesias, and hypertonicity/tenderness of surrounding soft tissue significantly limiting pt from max function  Pt would benefit from skilled physical therapy in order to max function and achieve pt goals in PT  Thank you for the referral of this pt  Impairments: abnormal muscle tone, abnormal or restricted ROM, activity intolerance, impaired physical strength, lacks appropriate home exercise program, pain with function and weight-bearing intolerance  Understanding of Dx/Px/POC: good   Prognosis: good    Goals  Short Term Goals;  1  Pt will be independent in HEP in 1-2 weeks  2  Pt will inc ROM by 25% in 4-6 weeks  3  Decrease pain to 3-4 worst severity w/ stairs in 4-6 weeks  Long Term Goals:  1  Pt will achieve ROM WNL by d/c    2  Pt will achieve 5/5 strength in all deficient areas by d/c   3  Decrease pain to 1-2/10 worst severity by d/c   4  Pt will return to max function by d/c        Plan  Planned modality interventions: cryotherapy  Other planned modality interventions: other modalities prn  Planned therapy interventions: activity modification, IADL retraining, ADL retraining, manual therapy, joint mobilization, balance/weight bearing training, gait training, functional ROM exercises, flexibility, neuromuscular re-education, strengthening, stretching, therapeutic activities, therapeutic exercise and home exercise program  Frequency: 1-2x/weeks  Duration in weeks: 6  Plan of Care beginning date: 2019  Plan of Care expiration date: 2019  Treatment plan discussed with: patient        Subjective Evaluation    History of Present Illness  Mechanism of injury: Pt reports to IE wearing post op walking shoe on L foot s/p L bunionectomy on 2019  Pt denies complications w/ surgery  Pt reports pain travels into the dorsal aspect of the foot  Pt reports numbness/paresthesias surrounding incision site and plantar aspect of foot  Pt reports a history of depression, however reports she is currently professionally treated  Pt denies suicidal thoughts/behaviors  Pain  Current pain ratin  At worst pain ratin  Alleviating factors: Elevating; tylenol prn  Exacerbated by: walking >20 minutes; stairs (negotiates in STS fashion); pt reports sleeping is not affected  Social Support  Steps to enter house: no  Stairs in house: yes (3 FF w/ railing )   Lives in: multiple-level home  Lives with: sister      Employment status: not working  Patient Goals  Patient goals for therapy: decreased pain and increased motion  Patient's goals regarding treatment: Pt wants to return to walking > 3x a week         Objective     Active Range of Motion   Left Ankle/Foot   Dorsiflexion (ke): 12 degrees   Dorsiflexion (kf): 14 degrees   Plantar flexion: 52 degrees   Inversion: 33 degrees   Eversion: 8 degrees   Great toe flexion: 20 degrees   Great toe extension: 24 degrees     Passive Range of Motion   Left Ankle/Foot    Dorsiflexion (ke): 20 degrees with pain  Dorsiflexion (kf): 22 degrees   Plantar flexion: 57 degrees   Inversion: 40 degrees   Eversion: 14 degrees with pain  Great toe flexion: 26 degrees   Great toe extension: 36 degrees with pain    Additional Passive Range of Motion Details  Decreased metatarsal mobility between 1st and 2nd digits     Strength/Myotome Testing     Left Ankle/Foot   Dorsiflexion: 5  Plantar flexion: 5  Inversion: 5  Eversion: 5  Great toe flexion: 4  Great toe extension: 5    General Comments: Ankle/Foot Comments   Observation:  Incision 4 cm along 1st metatarsal, well healed w/ no s/s of infection  Localized edema noted around L Great Toe/1st metatarsal       Circumferential Girth Measurement (L/R)  Figure 8: 51cm/50 5cm    Palpation (L):  TTP: along dorsal aspect of foot (1-3rd metatarsals), plantar surface great toe  Hypertonicity: L Gastroc/soleus   Adhesions noted along scar  Candy's negative b/l  Daily Treatment Diary:  EPOC: 9/27/2019  Precautions: HTN (controlled w/ meds); Anxiety; Depression  Co-Morbidities: CKD III; Hx of BG CVA; Bipolar Disorder; GERD; Hyperlipidemia;  Hyperparathyroidism      Manual  8/16            L Ankle PROM             L Metatarsal/GT PROM               L Ankle/ metatarsal mobs                          Total Time                 Exercise Diary  8/16            HEP Instruct & Review 5'                         Recumbent bike (when able)             Gastroc Stretch             Seated HR/TR             Neutral Ankles             Ankle AROM DF/PF/INV/EV             Piano toes             Toe abd/add             Ankle pumps             TB ankle DF/PF/INV/EV             BAPS Board             TB toe curl/ extend                                       Mini Squats             SLS                          Gait Training                              Modalities  8/16            CP  10'

## 2019-08-19 ENCOUNTER — OFFICE VISIT (OUTPATIENT)
Dept: PODIATRY | Facility: CLINIC | Age: 59
End: 2019-08-19

## 2019-08-19 VITALS — WEIGHT: 202.9 LBS | DIASTOLIC BLOOD PRESSURE: 71 MMHG | SYSTOLIC BLOOD PRESSURE: 110 MMHG | BODY MASS INDEX: 31.78 KG/M2

## 2019-08-19 DIAGNOSIS — Z98.890 POSTOPERATIVE STATE: Primary | ICD-10-CM

## 2019-08-19 PROCEDURE — 99024 POSTOP FOLLOW-UP VISIT: CPT | Performed by: PODIATRIST

## 2019-08-19 NOTE — PROGRESS NOTES
Assessment:  1  Post-op state    Plan:  1  xrays completed on 8/6/19 reviewed ; good interval healing and correction noted  2  Patient may begin WBAT in normal shoegear  3  Patient to complete 4 weeks of PT  4  Follow-up 4 weeks    Problem List Items Addressed This Visit        Other    Postoperative state - Primary             Diagnoses and all orders for this visit:    Postoperative state          Subjective:      Patient ID: Yudelka Gómez is a 64 y o  female  Marybel Fat is here for a post-op visit  She presents in an post-op shoe, denies foot pain, has some edema, completed 1x visit of PT and did get her interval x-rays done  The following portions of the patient's history were reviewed and updated as appropriate: allergies, current medications, past family history, past medical history, past social history, past surgical history and problem list     Review of Systems   Constitutional: Negative  Objective:      /71   Wt 92 kg (202 lb 14 4 oz)   LMP  (LMP Unknown)   BMI 31 78 kg/m²          Physical Exam   Constitutional: She appears well-developed and well-nourished  No distress  Skin: She is not diaphoretic  Nursing note and vitals reviewed  I note NVS intact to the foot  Good ROM of the 1st MTPJ noted without restriction, crepitus  Mild edema is still present  The incision is completely healed

## 2019-08-20 ENCOUNTER — OFFICE VISIT (OUTPATIENT)
Dept: PHYSICAL THERAPY | Facility: CLINIC | Age: 59
End: 2019-08-20
Payer: MEDICARE

## 2019-08-20 DIAGNOSIS — M21.612 BUNION OF GREAT TOE OF LEFT FOOT: Primary | ICD-10-CM

## 2019-08-20 DIAGNOSIS — Z98.890 S/P BUNIONECTOMY: ICD-10-CM

## 2019-08-20 PROCEDURE — 97110 THERAPEUTIC EXERCISES: CPT

## 2019-08-20 NOTE — PROGRESS NOTES
Daily Note     Today's date: 2019  Patient name: Tata Cash  : 1960  MRN: [id number]  Referring provider: Em Paniagua DPM  Dx:   Encounter Diagnosis     ICD-10-CM    1  Bunion of great toe of left foot M21 612    2  S/P bunionectomy Z98 [id number]                   Subjective: pt presents w/ shoe boot stating her foot is too swollen to put a shoe on  Objective: See treatment diary below      Assessment: Tolerated treatment well  Patient exhibited good technique with therapeutic exercises and would benefit from continued PT for cont'd work on ROM and strengthening  Plan: Continue per plan of care  Progress treatment as tolerated  Daily Treatment Diary:  EPOC: 2019  Precautions: HTN (controlled w/ meds); Anxiety; Depression  Co-Morbidities: CKD III; Hx of BG CVA; Bipolar Disorder; GERD; Hyperlipidemia;  Hyperparathyroidism      Manual             L Ankle PROM  JK           L Metatarsal/GT PROM    JK           L Ankle/ metatarsal mobs                          Total Time  10'               Exercise Diary             HEP Instruct & Review 5'                         Recumbent bike (when able)  5'           Gastroc Stretch  30"x3           Seated HR/TR  20x            Neutral Ankles  10"x10           Ankle AROM DF/PF/INV/EV  20           Toe flex/ext  20           Toe abd/add  20           Ankle pumps             TB ankle DF/PF/INV/EV             BAPS Board             TB toe curl/ extend  Towel  20x                                     Mini Squats  10x5"           SLS                          Gait Training                              Modalities             CP  10'

## 2019-08-23 ENCOUNTER — APPOINTMENT (OUTPATIENT)
Dept: PHYSICAL THERAPY | Facility: CLINIC | Age: 59
End: 2019-08-23
Payer: MEDICARE

## 2019-08-27 ENCOUNTER — OFFICE VISIT (OUTPATIENT)
Dept: PULMONOLOGY | Facility: HOSPITAL | Age: 59
End: 2019-08-27
Payer: MEDICARE

## 2019-08-27 VITALS
BODY MASS INDEX: 31.71 KG/M2 | DIASTOLIC BLOOD PRESSURE: 90 MMHG | TEMPERATURE: 97 F | HEART RATE: 100 BPM | OXYGEN SATURATION: 96 % | SYSTOLIC BLOOD PRESSURE: 122 MMHG | WEIGHT: 202 LBS | HEIGHT: 67 IN

## 2019-08-27 DIAGNOSIS — R06.02 SOB (SHORTNESS OF BREATH): ICD-10-CM

## 2019-08-27 DIAGNOSIS — J45.40 MODERATE PERSISTENT ASTHMA WITHOUT COMPLICATION: Primary | ICD-10-CM

## 2019-08-27 PROCEDURE — 99214 OFFICE O/P EST MOD 30 MIN: CPT | Performed by: INTERNAL MEDICINE

## 2019-08-27 RX ORDER — ALBUTEROL SULFATE 90 UG/1
2 AEROSOL, METERED RESPIRATORY (INHALATION) EVERY 6 HOURS PRN
Qty: 1 INHALER | Refills: 5 | Status: SHIPPED | OUTPATIENT
Start: 2019-08-27 | End: 2020-08-20 | Stop reason: ALTCHOICE

## 2019-08-27 RX ORDER — BUDESONIDE AND FORMOTEROL FUMARATE DIHYDRATE 160; 4.5 UG/1; UG/1
2 AEROSOL RESPIRATORY (INHALATION) 2 TIMES DAILY
Qty: 3 INHALER | Refills: 3 | Status: SHIPPED | OUTPATIENT
Start: 2019-08-27 | End: 2021-03-30 | Stop reason: SDUPTHER

## 2019-08-27 NOTE — ASSESSMENT & PLAN NOTE
Although her spirometry did not show obstruction, she did have air trapping and symptoms most suggestive asthma  She has Symbicort to be beneficial   Unfortunately it is extremely expensive  She provided we with paperwork to [employer] so that she may get medication through the patient assistance program   I will also obtain additional blood work including CBC with differential and [place] RAST to evaluate from allergic perspective  She will also continue Singulair daily  I provided her a rescue inhaler as well

## 2019-08-27 NOTE — PROGRESS NOTES
Pulmonary Follow Up Note   Susan Garcia 64 y o  female MRN: [id number]  8/27/2019      Assessment/Plan: Moderate persistent asthma without complication  Although her spirometry did not show obstruction, she did have air trapping and symptoms most suggestive asthma  She has Symbicort to be beneficial   Unfortunately it is extremely expensive  She provided we with paperwork to [employer] so that she may get medication through the patient assistance program   I will also obtain additional blood work including CBC with differential and [facility] RAST to evaluate from allergic perspective  She will also continue Singulair daily  I provided her a rescue inhaler as well  Visit orders:    Diagnoses and all orders for this visit:    Moderate persistent asthma without complication  -     [facility] Allergy Panel, Adult; Future  -     CBC and differential; Future  -     albuterol (PROAIR HFA) 90 mcg/act inhaler; Inhale 2 puffs every 6 (six) hours as needed for wheezing    SOB (shortness of breath)  -     budesonide-formoterol (SYMBICORT) 160-4 5 mcg/act inhaler; Inhale 2 puffs 2 (two) times a day Rinse mouth after use  Return in about 4 months (around 12/27/2019)  History of Present Illness   HPI:  Susan Garcia is a 64 y o  female who is here today for follow-up regarding shortness of breath and wheezing  She underwent pulmonary function testing which showed significant air trapping  She was given a sample of Symbicort which she has been using twice daily  It has significantly her symptoms  She is no longer wheezing  Her shortness of breath has improved  She is mostly sleeping through the night without issues  She admits to some night snack  She has cough or sputum production  She does not have a rescue inhaler  Review of Systems   Constitutional: Negative for chills, fever and unexpected weight change  HENT: Negative for postnasal drip and sore throat      Eyes: Negative for visual disturbance  Respiratory:        As noted in HPI   Cardiovascular: Negative for chest pain  Gastrointestinal: Negative for abdominal pain, diarrhea and vomiting  Genitourinary: Negative for difficulty urinating  Skin: Negative for rash  Neurological: Negative for headaches  Hematological: Negative for adenopathy  Psychiatric/Behavioral: Negative  All other systems reviewed and are negative  Historical Information   Past Medical History:   Diagnosis Date    Ankle sprain     left    Anxiety disorder     Bipolar 2 disorder (HCC)     Chronic back pain     CKD (chronic kidney disease) stage 3, GFR 30-59 ml/min ([facility])     CVA (cerebral vascular accident) ([facility] [address] )     noted on MRI in the past    Depression     GERD (gastroesophageal reflux disease)     Hypercholesteremia     Hyperlipidemia     Hypernatremia     Hypertension     Hypokalemia     Intervertebral disc disorder with radiculopathy of lumbosacral region     resolved: 2015    Panic attacks     Pericardial effusion     PONV (postoperative nausea and vomiting)     Radiculitis     resolved: 2015    Secondary renal hyperparathyroidism ([facility] [address] )     Vitamin D deficiency      Past Surgical History:   Procedure Laterality Date    BUNIONECTOMY      Left foot     COLON SURGERY      COLONOSCOPY      DILATION AND CURETTAGE OF UTERUS      INDUCED       surgically induced    WA CORRJ HALLUX VALGUS W/SESMDC W/DIST METAR OSTEOT Left 2019    Procedure: Mairlu Pathak;  Surgeon: Sudha Alexander DPM;  [facility];  Service: Podiatry    WA ERCP DX COLLECTION SPECIMEN BRUSHING/WASHING N/A 2018    Procedure: ENDOSCOPIC RETROGRADE CHOLANGIOPANCREATOGRAPHY (ERCP);   Surgeon: Cesia Weinstein MD;  [facility];  Service: Gastroenterology    WA LAP, SURG PROCTOPEXY N/A 2018    Procedure: ROBOTIC SIGMOID RESECTION / RECTOPEXY;  Surgeon: Sylvain Bryant MD;  [facility]; Service: Colorectal    NC SIGMOIDOSCOPY FLX DX W/COLLJ SPEC BR/WA IF PFRMD N/A 7/13/2018    Procedure: SIGMOIDOSCOPY FLEXIBLE;  Surgeon: Jaylen Denise MD;  [facility];  Service: Colorectal    TUBAL LIGATION Bilateral [facility] THYROID BIOPSY  7/30/2019     Family History   Problem Relation Age of Onset    Bipolar disorder Mother     Mental illness Mother         depression    Stroke Mother     Dementia Mother     Heart disease Father     Hypertension Father     Diabetes Father     Other Family         Back disorder    Diabetes Family     Heart disease Family     Hypertension Family     Breast cancer Family     Stroke Family     Thyroid disease Family     Breast cancer Paternal Grandmother     Breast cancer Paternal [de-identified]     Breast cancer Maternal Aunt     Mental illness Sister     Mental illness Sister     Heart disease Sister     No Known Problems Sister     No Known Problems Sister     Other Son         pituitary tumor    Hypertension Son     Obesity Son     No Known Problems Son     Substance Abuse Neg Hx         neg fam hx       Social History     Tobacco Use   Smoking Status Never Smoker   Smokeless Tobacco Never Used     Meds/Allergies     Current Outpatient Medications:     acetaminophen (TYLENOL) 325 mg tablet, Take 650 mg by mouth every 6 (six) hours as needed for mild pain, Disp: , Rfl:     AMILoride 5 mg tablet, 1 bid, Disp: , Rfl:     amLODIPine (NORVASC) 5 mg tablet, Take 1 tablet (5 mg total) by mouth 2 (two) times a day, Disp: 180 tablet, Rfl: 3    aspirin (ECOTRIN LOW STRENGTH) 81 mg EC tablet, Take 1 tablet (81 mg total) by mouth daily, Disp: 30 tablet, Rfl: 0    atorvastatin (LIPITOR) 40 mg tablet, Take 0 5 tablets (20 mg total) by mouth daily, Disp: 30 tablet, Rfl: 3    budesonide-formoterol (SYMBICORT) 160-4 5 mcg/act inhaler, Inhale 2 puffs 2 (two) times a day Rinse mouth after use , Disp: 3 Inhaler, Rfl: 3    calcium acetate (PHOSLO) 667 mg capsule, Take 1 capsule (667 mg total) by mouth 3 (three) times a day with meals, Disp: 90 capsule, Rfl: 3    carvedilol (COREG) 3 125 mg tablet, Take 1 tablet (3 125 mg total) by mouth 2 (two) times a day with meals, Disp: 60 tablet, Rfl: 2    clonazePAM (KLONOPIN) 0 5 mg tablet, Take 1 tablet (0 5 mg total) by mouth 3 (three) times a day for 7 days, Disp: 21 tablet, Rfl: 0    fluvoxaMINE (LUVOX) 100 mg tablet, Take 100 mg by mouth 3 (three) times a day  , Disp: , Rfl:     lamoTRIgine (LaMICtal) 100 mg tablet, Take 100 mg by mouth 3 (three) times a day Pt takes 3 times daily, Disp: , Rfl:     linaCLOtide (LINZESS) 290 MCG CAPS, Take 1 capsule by mouth daily for 90 days, Disp: 90 capsule, Rfl: 3    montelukast (SINGULAIR) 10 mg tablet, TAKE 1 TABLET BY MOUTH AT BEDTIME, Disp: 30 tablet, Rfl: 11    omeprazole (PriLOSEC) 40 MG capsule, TAKE 1 CAPSULE BY MOUTH  DAILY AT BEDTIME, Disp: 90 capsule, Rfl: 3    Polyethylene Glycol 3350 (MIRALAX PO), Take by mouth as needed , Disp: , Rfl:     psyllium (METAMUCIL) 58 6 % powder, Take 1 packet by mouth daily, Disp: , Rfl:     QUEtiapine (SEROQUEL) 300 mg tablet, Take 1 tablet (300 mg total) by mouth every morning 1 in the AM     ( also takes 400 mg at bedtime), Disp: 90 tablet, Rfl: 3    QUEtiapine (SEROquel) 400 MG tablet, 1 at bedtime, Disp: , Rfl:     albuterol (PROAIR HFA) 90 mcg/act inhaler, Inhale 2 puffs every 6 (six) hours as needed for wheezing, Disp: 1 Inhaler, Rfl: 5  No Known Allergies    Vitals: Blood pressure 122/90, pulse 100, temperature (!) 97 °F (36 1 °C), temperature source Tympanic, height 5' 7" (1 702 m), weight 91 6 kg (202 lb), SpO2 96 %, not currently breastfeeding  Body mass index is 31 64 kg/m²  Oxygen Therapy  SpO2: 96 %  Oxygen Therapy: None (Room air)    Physical Exam   Constitutional: She is oriented to person, place, and time  No distress  HENT:   Head: Normocephalic  Mouth/Throat: No oropharyngeal exudate     Eyes: Pupils are equal, round, and reactive to light  No scleral icterus  Neck: Neck supple  No JVD present  Cardiovascular: Normal rate and regular rhythm  Pulmonary/Chest: She has no wheezes  She has no rales  Abdominal: Soft  There is no tenderness  Musculoskeletal: She exhibits no edema  Lymphadenopathy:     She has no cervical adenopathy  Neurological: She is alert and oriented to person, place, and time  Skin: Skin is warm and dry  Psychiatric: She has a normal mood and affect  Labs: I have personally reviewed pertinent lab results  Lab Results   Component Value Date    WBC 5 88 06/12/2019    HGB 13 2 06/12/2019    HCT 41 8 06/12/2019    MCV 98 06/12/2019     06/12/2019     Lab Results   Component Value Date    CALCIUM 9 6 06/12/2019     02/27/2017    K 4 4 06/12/2019    CO2 25 06/12/2019     06/12/2019    BUN 44 (H) 06/12/2019    CREATININE 2 06 (H) 06/12/2019     No results found for: IGE  Lab Results   Component Value Date    ALT 42 06/12/2019    AST 27 06/12/2019    ALKPHOS 225 (H) 06/12/2019       Imaging and other studies: I have personally reviewed pertinent reports  and I have personally reviewed pertinent films in PACS CT of the chest from 6/8/19 shows ground-glass changes dependently in posterior lower lobes    Pulmonary function testing:  Performed 8/12/19   FEV1/FVC ratio 85%   FEV1 115% predicted  % predicted  No response to bronchodilators   % predicted   % predicted  DLCO corrected for hemoglobin 74 % predicted  Normal spirometry  Lung volumes with severe air trapping  Diffusion capacity mildly reduced    Other Studies: I have personally reviewed pertinent reports  Echocardiogram from 3/20/19 shows an EF of 05%, grade 1 diastolic dysfunction, RV size and systolic function

## 2019-08-28 ENCOUNTER — OFFICE VISIT (OUTPATIENT)
Dept: PHYSICAL THERAPY | Facility: CLINIC | Age: 59
End: 2019-08-28
Payer: MEDICARE

## 2019-08-28 DIAGNOSIS — Z98.890 S/P BUNIONECTOMY: ICD-10-CM

## 2019-08-28 DIAGNOSIS — M21.612 BUNION OF GREAT TOE OF LEFT FOOT: Primary | ICD-10-CM

## 2019-08-28 PROCEDURE — 97110 THERAPEUTIC EXERCISES: CPT | Performed by: PHYSICAL THERAPIST

## 2019-08-28 PROCEDURE — 97112 NEUROMUSCULAR REEDUCATION: CPT | Performed by: PHYSICAL THERAPIST

## 2019-08-28 PROCEDURE — 97140 MANUAL THERAPY 1/> REGIONS: CPT | Performed by: PHYSICAL THERAPIST

## 2019-08-28 NOTE — PROGRESS NOTES
Daily Note     Today's date: 2019  Patient name: Yudelka Gómez  : 1960  MRN: [id number]  Referring provider: Desirae Burnett DPM  Dx:   Encounter Diagnosis     ICD-10-CM    1  Bunion of great toe of left foot M21 612    2  S/P bunionectomy Z98 [id number]                   Subjective: pt presents to tx session w/ sneaker donned on LLE  She reports that today was the first day she was able to wear a sneaker, but has more loosely tied vs  R foot  Objective: See treatment diary below      Assessment: Progressed WB'ing ex this visit to include standing gastroc stretch and WS to L w/ good tolerance  Intermittent cueing for form  Remaining ex tolerated well  Plan: Continue per plan of care  Progress treatment as tolerated  Daily Treatment Diary:  EPOC: 2019  Precautions: HTN (controlled w/ meds); Anxiety; Depression  Co-Morbidities: CKD III; Hx of BG CVA; Bipolar Disorder; GERD; Hyperlipidemia; Hyperparathyroidism      Manual            L Ankle PROM  JK RS          L Metatarsal/GT PROM    JK RS          L Ankle/ metatarsal mobs   RS          STM/TPR to interossei m     RS          k-tape scar   RS          Total Time  10' 15'              Exercise Diary            HEP Instruct & Review 5'                         Recumbent bike (when able)  5' 5'          Gastroc Stretch  30"x3 30"x3 wedge          Seated HR/TR  20x  20x ea          Neutral Ankles  10"x10 10"x10          Ankle AROM DF/PF/INV/EV  20           Toe flex/ext  20 20x ea          Toe abd/add  20 20x ea          Ankle pumps   HEP          TB ankle DF/PF/INV/EV   HEP          BAPS Board   NV          TB toe curl/ extend  Towel  20x NP          WS to L   10"x10          SLS (when able)             Mini Squats  10x5" 10"x10                       Gait Training                              Modalities            CP  10'  home

## 2019-08-30 ENCOUNTER — OFFICE VISIT (OUTPATIENT)
Dept: PHYSICAL THERAPY | Facility: CLINIC | Age: 59
End: 2019-08-30
Payer: MEDICARE

## 2019-08-30 DIAGNOSIS — M21.612 BUNION OF GREAT TOE OF LEFT FOOT: Primary | ICD-10-CM

## 2019-08-30 DIAGNOSIS — Z98.890 S/P BUNIONECTOMY: ICD-10-CM

## 2019-08-30 PROCEDURE — 97110 THERAPEUTIC EXERCISES: CPT

## 2019-08-30 PROCEDURE — 97140 MANUAL THERAPY 1/> REGIONS: CPT

## 2019-08-30 NOTE — PROGRESS NOTES
Daily Note     Today's date: 2019  Patient name: Savannah Maldonado  : 1960  MRN: [id number]  Referring provider: Jason Riggins DPM  Dx:   Encounter Diagnosis     ICD-10-CM    1  Bunion of great toe of left foot M21 612    2  S/P bunionectomy Z98 890                   Subjective: Pt reports good compliance w/ HEP and CP 2x/day  Objective: See treatment diary below      Assessment: Tolerated treatment well  Patient exhibited good technique with therapeutic exercises and needs cont'd strengthening  Plan: Continue per plan of care  Progress treatment as tolerated  Daily Treatment Diary:  EPOC: 2019  Precautions: HTN (controlled w/ meds); Anxiety; Depression  Co-Morbidities: CKD III; Hx of BG CVA; Bipolar Disorder; GERD; Hyperlipidemia; Hyperparathyroidism      Manual           L Ankle PROM  JK RS JK         L Metatarsal/GT PROM    JK RS JK         L Ankle/ metatarsal mobs   RS JK         STM/TPR to interossei m     RS          k-tape scar   RS          Total Time  10' 15' 10'             Exercise Diary           HEP Instruct & Review 5'                         Recumbent bike (when able)  5' 5' 5'         Gastroc Stretch  30"x3 30"x3 wedge 30"x3 wedge         Seated HR/TR  20x  20x ea Std 10x         Neutral Ankles  10"x10 10"x10 10"x10         Ankle AROM DF/PF/INV/EV  20           Toe flex/ext  20 20x ea 20x         Toe abd/add  20 20x ea 20x         Ankle pumps   HEP          TB ankle DF/PF/INV/EV   HEP          BAPS Board   NV L2 10xea         TB toe curl/ extend  Towel  20x NP Towel  20x         WS to L   10"x10 np         SLS (when able)    15"x5         Mini Squats  10x5" 10"x10 10"x10                      Gait Training                              Modalities           CP  10'  home

## 2019-09-02 DIAGNOSIS — F31.81 BIPOLAR 2 DISORDER (HCC): Chronic | ICD-10-CM

## 2019-09-03 DIAGNOSIS — K59.00 CONSTIPATION, UNSPECIFIED CONSTIPATION TYPE: ICD-10-CM

## 2019-09-03 RX ORDER — CLONAZEPAM 0.5 MG/1
TABLET ORAL
Qty: 270 TABLET | Refills: 0 | Status: SHIPPED | OUTPATIENT
Start: 2019-09-03 | End: 2019-11-21 | Stop reason: SDUPTHER

## 2019-09-03 NOTE — TELEPHONE ENCOUNTER
I contacted patient regarding the request for Medicare Part D to submit denial of low subsidy form  She states this is an appeal to previous submitted in June  She states prescription needs to go in with form  [employer] set to print to fax along

## 2019-09-03 NOTE — TELEPHONE ENCOUNTER
Pt came in and dropped off paperwork to be filled out for assistance with [employer]  Ppw given to nursing department       ce

## 2019-09-04 ENCOUNTER — OFFICE VISIT (OUTPATIENT)
Dept: PHYSICAL THERAPY | Facility: CLINIC | Age: 59
End: 2019-09-04
Payer: MEDICARE

## 2019-09-04 DIAGNOSIS — Z98.890 S/P BUNIONECTOMY: ICD-10-CM

## 2019-09-04 DIAGNOSIS — M21.612 BUNION OF GREAT TOE OF LEFT FOOT: Primary | ICD-10-CM

## 2019-09-04 PROCEDURE — 97140 MANUAL THERAPY 1/> REGIONS: CPT

## 2019-09-04 PROCEDURE — 97110 THERAPEUTIC EXERCISES: CPT

## 2019-09-04 NOTE — PROGRESS NOTES
Daily Note     Today's date: 2019  Patient name: Susan Garcia  : 1960  MRN: [id number]  Referring provider: Jaime Jules DPM  Dx: No diagnosis found  Subjective: Pt reports no new c/o's to offer  Reports she has been able to wear shoes  Reports not able to artem standing too long  Objective: See treatment diary below      Assessment: Tolerated treatment well  Patient needs cont'd work on strengthening and proprioception  Plan: Continue per plan of care  Progress treatment as tolerated  Daily Treatment Diary:  EPOC: 2019  Precautions: HTN (controlled w/ meds); Anxiety; Depression  Co-Morbidities: CKD III; Hx of BG CVA; Bipolar Disorder; GERD; Hyperlipidemia; Hyperparathyroidism      Manual  8/16 8/20 8/28 8/30 9/3        L Ankle PROM  JK RS JK JK        L Metatarsal/GT PROM    JK RS JK JK        L Ankle/ metatarsal mobs   RS JK JK        STM/TPR to interossei m     RS          k-tape scar   RS          Total Time  10' 15' 10' 10'            Exercise Diary  8/16 8/20 8/28 8/30 9/3        HEP Instruct & Review 5'                         Recumbent bike (when able)  5' 5' 5' 5'        Gastroc Stretch  30"x3 30"x3 wedge 30"x3 wedge 30"x3 wedge        Seated HR/TR  20x  20x ea Std 10x Std  20x        Neutral Ankles  10"x10 10"x10 10"x10 10"x10        Ankle AROM DF/PF/INV/EV  20           Toe flex/ext  20 20x ea 20x 20x        Toe abd/add  20 20x ea 20x 20x        Ankle pumps   HEP          TB ankle DF/PF/INV/EV   HEP          BAPS Board   NV L2 10xea L210x ea        TB toe curl/ extend  Towel  20x NP Towel  20x Towel  20x        WS to L   10"x10 np 10"x10        SLS (when able)    15"x5 15"x5        Mini Squats  10x5" 10"x10 10"x10 10"x10        clamshells     10"x10        Gait Training                              Modalities   93        CP  10'  home  10'

## 2019-09-05 ENCOUNTER — APPOINTMENT (OUTPATIENT)
Dept: PHYSICAL THERAPY | Facility: CLINIC | Age: 59
End: 2019-09-05
Payer: MEDICARE

## 2019-09-05 ENCOUNTER — TELEPHONE (OUTPATIENT)
Dept: NEPHROLOGY | Facility: CLINIC | Age: 59
End: 2019-09-05

## 2019-09-05 NOTE — TELEPHONE ENCOUNTER
Left detailed message reminding Marlene Herron on blood work that needs to get done prior to next week's appointment  Office number has been provided in the event of any questions or concerns   9/5/19

## 2019-09-06 ENCOUNTER — LAB (OUTPATIENT)
Dept: LAB | Facility: HOSPITAL | Age: 59
End: 2019-09-06
Attending: INTERNAL MEDICINE
Payer: MEDICARE

## 2019-09-06 ENCOUNTER — TELEPHONE (OUTPATIENT)
Dept: NEPHROLOGY | Facility: CLINIC | Age: 59
End: 2019-09-06

## 2019-09-06 DIAGNOSIS — E83.39 HYPERPHOSPHATEMIA: ICD-10-CM

## 2019-09-06 DIAGNOSIS — I10 ESSENTIAL HYPERTENSION: ICD-10-CM

## 2019-09-06 DIAGNOSIS — J45.40 MODERATE PERSISTENT ASTHMA WITHOUT COMPLICATION: ICD-10-CM

## 2019-09-06 LAB
A ALTERNATA IGE QN: 0.39 KUA/I
A FUMIGATUS IGE QN: <0.1 KUA/I
ALLERGEN COMMENT: ABNORMAL
ANION GAP SERPL CALCULATED.3IONS-SCNC: 9 MMOL/L (ref 4–13)
BASOPHILS # BLD AUTO: 0.07 THOUSANDS/ΜL (ref 0–0.1)
BASOPHILS NFR BLD AUTO: 1 % (ref 0–1)
BERMUDA GRASS IGE QN: <0.1 KUA/I
BOXELDER IGE QN: <0.1 KUA/I
BUN SERPL-MCNC: 29 MG/DL (ref 5–25)
C HERBARUM IGE QN: <0.1 KUA/I
CALCIUM SERPL-MCNC: 9.9 MG/DL (ref 8.3–10.1)
CAT DANDER IGE QN: <0.1 KUA/I
CHLORIDE SERPL-SCNC: 105 MMOL/L (ref 100–108)
CHOLEST SERPL-MCNC: 217 MG/DL (ref 50–200)
CMN PIGWEED IGE QN: <0.1 KUA/I
CO2 SERPL-SCNC: 27 MMOL/L (ref 21–32)
COMMON RAGWEED IGE QN: 0.8 KUA/I
COTTONWOOD IGE QN: <0.1 KUA/I
CREAT SERPL-MCNC: 2.45 MG/DL (ref 0.6–1.3)
D FARINAE IGE QN: <0.1 KUA/I
D PTERONYSS IGE QN: <0.1 KUA/I
DOG DANDER IGE QN: <0.1 KUA/I
EOSINOPHIL # BLD AUTO: 0.63 THOUSAND/ΜL (ref 0–0.61)
EOSINOPHIL NFR BLD AUTO: 11 % (ref 0–6)
ERYTHROCYTE [DISTWIDTH] IN BLOOD BY AUTOMATED COUNT: 14 % (ref 11.6–15.1)
GFR SERPL CREATININE-BSD FRML MDRD: 21 ML/MIN/1.73SQ M
GLUCOSE P FAST SERPL-MCNC: 106 MG/DL (ref 65–99)
HCT VFR BLD AUTO: 40.9 % (ref 34.8–46.1)
HDLC SERPL-MCNC: 64 MG/DL (ref 40–60)
HGB BLD-MCNC: 13.1 G/DL (ref 11.5–15.4)
IMM GRANULOCYTES # BLD AUTO: 0.02 THOUSAND/UL (ref 0–0.2)
IMM GRANULOCYTES NFR BLD AUTO: 0 % (ref 0–2)
LDLC SERPL CALC-MCNC: 132 MG/DL (ref 0–100)
LONDON PLANE IGE QN: <0.1 KUA/I
LYMPHOCYTES # BLD AUTO: 1.32 THOUSANDS/ΜL (ref 0.6–4.47)
LYMPHOCYTES NFR BLD AUTO: 24 % (ref 14–44)
MCH RBC QN AUTO: 31 PG (ref 26.8–34.3)
MCHC RBC AUTO-ENTMCNC: 32 G/DL (ref 31.4–37.4)
MCV RBC AUTO: 97 FL (ref 82–98)
MONOCYTES # BLD AUTO: 0.44 THOUSAND/ΜL (ref 0.17–1.22)
MONOCYTES NFR BLD AUTO: 8 % (ref 4–12)
MOUSE URINE PROT IGE QN: <0.1 KUA/I
MT JUNIPER IGE QN: <0.1 KUA/I
MUGWORT IGE QN: <0.1 KUA/I
NEUTROPHILS # BLD AUTO: 3.13 THOUSANDS/ΜL (ref 1.85–7.62)
NEUTS SEG NFR BLD AUTO: 56 % (ref 43–75)
NRBC BLD AUTO-RTO: 0 /100 WBCS
P NOTATUM IGE QN: <0.1 KUA/I
PHOSPHATE SERPL-MCNC: 3.6 MG/DL (ref 2.7–4.5)
PLATELET # BLD AUTO: 242 THOUSANDS/UL (ref 149–390)
PMV BLD AUTO: 10.5 FL (ref 8.9–12.7)
POTASSIUM SERPL-SCNC: 4.5 MMOL/L (ref 3.5–5.3)
RBC # BLD AUTO: 4.22 MILLION/UL (ref 3.81–5.12)
ROACH IGE QN: <0.1 KUA/I
SHEEP SORREL IGE QN: <0.1 KUA/I
SILVER BIRCH IGE QN: <0.1 KUA/I
SODIUM SERPL-SCNC: 141 MMOL/L (ref 136–145)
TIMOTHY IGE QN: <0.1 KUA/I
TOTAL IGE SMQN RAST: 23.5 KU/L (ref 0–113)
TRIGL SERPL-MCNC: 107 MG/DL
WALNUT IGE QN: <0.1 KUA/I
WBC # BLD AUTO: 5.61 THOUSAND/UL (ref 4.31–10.16)
WHITE ASH IGE QN: <0.1 KUA/I
WHITE ELM IGE QN: <0.1 KUA/I
WHITE MULBERRY IGE QN: <0.1 KUA/I
WHITE OAK IGE QN: <0.1 KUA/I

## 2019-09-06 PROCEDURE — 80061 LIPID PANEL: CPT

## 2019-09-06 PROCEDURE — 85025 COMPLETE CBC W/AUTO DIFF WBC: CPT

## 2019-09-06 PROCEDURE — 82785 ASSAY OF IGE: CPT

## 2019-09-06 PROCEDURE — 36415 COLL VENOUS BLD VENIPUNCTURE: CPT

## 2019-09-06 PROCEDURE — 84100 ASSAY OF PHOSPHORUS: CPT

## 2019-09-06 PROCEDURE — 80048 BASIC METABOLIC PNL TOTAL CA: CPT

## 2019-09-06 PROCEDURE — 86003 ALLG SPEC IGE CRUDE XTRC EA: CPT

## 2019-09-06 NOTE — TELEPHONE ENCOUNTER
Dr Heron Oneill will discuss results with Farhan Suarez at next [facility] appointment 9/6/19

## 2019-09-06 NOTE — TELEPHONE ENCOUNTER
----- Message from Avel Davenport DO sent at 9/6/2019 12:32 PM EDT -----  Creatinine slightly increased to 2 45 will discuss with her on Monday at f/u appointment   Otherwise labs stable

## 2019-09-09 ENCOUNTER — OFFICE VISIT (OUTPATIENT)
Dept: NEPHROLOGY | Facility: HOSPITAL | Age: 59
End: 2019-09-09
Payer: MEDICARE

## 2019-09-09 VITALS
WEIGHT: 202 LBS | DIASTOLIC BLOOD PRESSURE: 82 MMHG | HEART RATE: 70 BPM | BODY MASS INDEX: 31.71 KG/M2 | SYSTOLIC BLOOD PRESSURE: 120 MMHG | HEIGHT: 67 IN

## 2019-09-09 DIAGNOSIS — N28.1 RENAL CYST: ICD-10-CM

## 2019-09-09 DIAGNOSIS — N18.4 STAGE 4 CHRONIC KIDNEY DISEASE (HCC): ICD-10-CM

## 2019-09-09 DIAGNOSIS — R60.1 GENERALIZED EDEMA: Primary | ICD-10-CM

## 2019-09-09 DIAGNOSIS — E21.3 HYPERPARATHYROIDISM (HCC): ICD-10-CM

## 2019-09-09 DIAGNOSIS — E55.9 VITAMIN D DEFICIENCY: ICD-10-CM

## 2019-09-09 DIAGNOSIS — N25.81 SECONDARY RENAL HYPERPARATHYROIDISM (HCC): ICD-10-CM

## 2019-09-09 DIAGNOSIS — I10 ESSENTIAL HYPERTENSION: ICD-10-CM

## 2019-09-09 PROBLEM — N18.30 CHRONIC KIDNEY DISEASE, STAGE 3 (HCC): Status: RESOLVED | Noted: 2017-11-07 | Resolved: 2019-09-09

## 2019-09-09 PROCEDURE — 99214 OFFICE O/P EST MOD 30 MIN: CPT | Performed by: INTERNAL MEDICINE

## 2019-09-09 NOTE — PATIENT INSTRUCTIONS
Chronic Kidney Disease   WHAT YOU NEED TO KNOW:   Chronic kidney disease (CKD) is the gradual and permanent loss of kidney function  It is also called chronic kidney failure, or chronic renal insufficiency  Normally, the kidneys remove fluid, chemicals, and waste from your blood  These wastes are turned into urine by your kidneys  CKD may worsen over time and lead to kidney failure  DISCHARGE INSTRUCTIONS:   Return to the emergency department if:   · You are confused and very drowsy  · You have a seizure  · You have shortness of breath  Contact your healthcare provider if:   · You suddenly gain or lose more weight than your healthcare provider has told you is okay  · You have itchy skin or a rash  · You urinate more or less than you normally do  · You have blood in your urine  · You have nausea and repeated vomiting  · You have fatigue or muscle weakness  · You have hiccups that will not stop  · You have questions or concerns about your condition or care  Medicines:   · Medicines  may be given to decrease blood pressure and get rid of extra fluid  You may also receive medicine to manage health conditions that may occur with CKD, such as anemia, diabetes, and heart disease  · Take your medicine as directed  Contact your healthcare provider if you think your medicine is not helping or if you have side effects  Tell him or her if you are allergic to any medicine  Keep a list of the medicines, vitamins, and herbs you take  Include the amounts, and when and why you take them  Bring the list or the pill bottles to follow-up visits  Carry your medicine list with you in case of an emergency  Follow up with your healthcare provider as directed: You will need to return for tests to monitor your kidney function  You may also be referred to a kidney specialist  Write down your questions so you remember to ask them during your visits  Manage other health conditions:   Follow your healthcare provider's directions on how to manage diabetes, high blood pressure, and heart disease  These conditions can make CKD worse  Talk to your healthcare provider before you take over-the-counter medicine  Medicines such as NSAIDs, stomach medicine, or laxatives may harm your kidneys  Weigh yourself daily:  Ask your healthcare provider what your weight should be  Ask how much liquid you should drink each day  CKD may cause you to gain or lose weight rapidly  Weigh yourself every day  Write down your weight, how much liquid you drink or eat, and how much you urinate each day  Contact your healthcare provider if your weight is higher or lower than it should be  Manage CKD:   · Maintain a healthy weight  Ask your healthcare provider how much you should weigh  Ask him to help you create a weight loss plan if you are overweight  · Exercise 30 to 60 minutes a day, 4 to 7 times a week, or as directed  Ask about the best exercise plan for you  Regular exercise can help you manage CKD, high blood pressure, and diabetes  · Follow your healthcare provider's advice about what to eat and drink  He may tell you to eat food low in sodium (salt), potassium, phosphorus, or protein  You may need to see a dietitian if you need help planning meals  Ask how much liquid to drink each day and which liquids are best for you  · Limit alcohol  Ask how much alcohol is safe for you to drink  A drink of alcohol is 12 ounces of beer, 5 ounces of wine, or 1½ ounces of liquor  · Do not smoke  Nicotine and other chemicals in cigarettes and cigars can cause lung and kidney damage  Ask your healthcare provider for information if you currently smoke and need help to quit  E-cigarettes or smokeless tobacco still contain nicotine  Talk to your healthcare provider before you use these products  · Ask your healthcare provider if you need vaccines    Infections such as pneumonia, influenza, and hepatitis can be more harmful or more likely to occur in a person who has CKD  Vaccines reduce your risk of infection with these viruses  © 2017 [facility] Information is for End User's use only and may not be sold, redistributed or otherwise used for commercial purposes  All illustrations and images included in CareNotes® are the copyrighted property of A D A M , Inc  or Everett Murphy  The above information is an  only  It is not intended as medical advice for individual conditions or treatments  Talk to your doctor, nurse or pharmacist before following any medical regimen to see if it is safe and effective for you

## 2019-09-09 NOTE — PROGRESS NOTES
OFFICE FOLLOW UP - Nephrology   Dona Mae 64 y o  female MRN: [id number]    Encounter: [id number]        ASSESSMENT and PLAN:  Diagnoses and all orders for this visit:    61-year-old female with a past medical history of bipolar disorder type 2, stage 3 chronic kidney disease who presents for follow-up    Chronic kidney disease, stage IV  - creatinine trending upwards note 2-2 4 with estimated GFR of 20-25  - she is currently not on any nephrotoxic agent,  Was on lithium for several years  -  her creatinine is a remained stable around 2 0, her chronic kidney disease is related to multiple factors chronic lithium use in the past with a history of overdose on lithium, chronic lactulose abuse as well which creates a pre renal state in the past with multiple episodes of acute kidney injury  - renal cyst is stable in size  -she is off her potassium supplementation  -blood pressures have been well controlled  -urine protein to creatinine ratios have been minimal  -she has had echogenic kidneys that have been small in size since at least 2016  -completed CKD education  -consult for CKD nutrition for stage IV chronic kidney disease  -she is more interested in hemodialysis then peritoneal dialysis will schedule for access/vascular surgery consult for fistula placement    Bipolar 2 disorder ([address] ), eating disorder  - she was on lithium in her 25s and may have some chronic interstitial nephritis associated with the she also has some mild hypernatremia  - she is currently being treated with Seroquel  -she is now following up with a psychiatrist and psychologist monthly, she does also have a history of an eating disorder    Cyst of right kidney  - she has had recent MRIs and CT scan will repeat a renal ultrasound in  2019 vto make sure that this is stable  -bilateral simple renal cysts that are unchanged from renal ultrasound from December    Secondary renal hyperparathyroidism [facility])  - she has a PTH around 150  - continue vitamin D3 2000 units daily    Proteinuria  - she has a positive urine protein to creatinine ratio but a negative urinalysis she is  mildly anemic with CKD    She was on lithium several years ago she also has had acute kidney injury from chronic lactulose  Use  -  limited serologic workup was negative last urine protein to creatinine ratio was negligible    hypertension  -she is now on carvedilol 3 125 mg twice daily, amlodipine 5 mg 2 times daily, and amiloride 5 mg twice daily-(in the setting of DI associated with chronic lithium use Amiloride was initiated)  -she is off diuretics and raas blockade, given her mental health issues she was taking large amounts of diuretics and laxatives so this point we will continue to hold these medications  -blood pressure is well controlled    Hypernatremia  -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 in the past her serum sodiums did go to 153  -increased Amiloride to 5 mg twice daily and her serum sodium has improved continue to monitor now monthly    Elevated LFTs  -LFTs are stable  -continue statin    Vitamin-D deficiency-now improved on supplementation    Secondary hyperparathyroidism will continue to check PTH    She had a recent bunion surgery unfortunate creatinine now elevated with an estimated GFR 20-25 will prepare for renal replacement therapy continue to check labs every 3 months in follow-up in 3 months    HPI: Dona Mae is a 64 y o  female who is here for Follow-up    She is currently doing okay in the interim she was recently diagnosed with asthma, now is on inhalers, she also had a bunionectomy has been off her feet, appetite has been stable she denies any chest pain or shortness of breath no fevers or chills no nausea vomiting diarrhea or constipation she has had increased urination at night less so in the day    ROS:   All the systems were reviewed and were negative except as documented on the HPI     Allergies: Patient has no known allergies      Medications:   Current Outpatient Medications:     acetaminophen (TYLENOL) 325 mg tablet, Take 650 mg by mouth every 6 (six) hours as needed for mild pain, Disp: , Rfl:     albuterol (PROAIR HFA) 90 mcg/act inhaler, Inhale 2 puffs every 6 (six) hours as needed for wheezing, Disp: 1 Inhaler, Rfl: 5    AMILoride 5 mg tablet, 1 bid, Disp: , Rfl:     amLODIPine (NORVASC) 5 mg tablet, Take 1 tablet (5 mg total) by mouth 2 (two) times a day, Disp: 180 tablet, Rfl: 3    aspirin (ECOTRIN LOW STRENGTH) 81 mg EC tablet, Take 1 tablet (81 mg total) by mouth daily, Disp: 30 tablet, Rfl: 0    atorvastatin (LIPITOR) 40 mg tablet, Take 0 5 tablets (20 mg total) by mouth daily, Disp: 30 tablet, Rfl: 3    budesonide-formoterol (SYMBICORT) 160-4 5 mcg/act inhaler, Inhale 2 puffs 2 (two) times a day Rinse mouth after use , Disp: 3 Inhaler, Rfl: 3    calcium acetate (PHOSLO) 667 mg capsule, Take 1 capsule (667 mg total) by mouth 3 (three) times a day with meals, Disp: 90 capsule, Rfl: 3    carvedilol (COREG) 3 125 mg tablet, Take 1 tablet (3 125 mg total) by mouth 2 (two) times a day with meals, Disp: 60 tablet, Rfl: 2    clonazePAM (KlonoPIN) 0 5 mg tablet, TAKE 1 TABLET BY MOUTH 3  TIMES A DAY, Disp: 270 tablet, Rfl: 0    fluvoxaMINE (LUVOX) 100 mg tablet, Take 100 mg by mouth 3 (three) times a day  , Disp: , Rfl:     lamoTRIgine (LaMICtal) 100 mg tablet, Take 100 mg by mouth 3 (three) times a day Pt takes 3 times daily, Disp: , Rfl:     linaCLOtide (LINZESS) 290 MCG CAPS, Take 1 capsule by mouth daily for 90 days, Disp: 90 capsule, Rfl: 3    montelukast (SINGULAIR) 10 mg tablet, TAKE 1 TABLET BY MOUTH AT BEDTIME, Disp: 30 tablet, Rfl: 11    omeprazole (PriLOSEC) 40 MG capsule, TAKE 1 CAPSULE BY MOUTH  DAILY AT BEDTIME, Disp: 90 capsule, Rfl: 3    Polyethylene Glycol 3350 (MIRALAX PO), Take by mouth as needed , Disp: , Rfl:     psyllium (METAMUCIL) 58 6 % powder, Take 1 packet by mouth daily, Disp: , Rfl:     QUEtiapine (SEROQUEL) 300 mg tablet, Take 1 tablet (300 mg total) by mouth every morning 1 in the AM     ( also takes 400 mg at bedtime), Disp: 90 tablet, Rfl: 3    QUEtiapine (SEROquel) 400 MG tablet, 1 at bedtime, Disp: , Rfl:     Past Medical History:   Diagnosis Date    Ankle sprain     left    Anxiety disorder     Bipolar 2 disorder ([facility])     Chronic back pain     CKD (chronic kidney disease) stage 3, GFR 30-59 ml/min ([facility])     CVA (cerebral vascular accident) ([facility] [address] )     noted on MRI in the past    Depression     GERD (gastroesophageal reflux disease)     Hypercholesteremia     Hyperlipidemia     Hypernatremia     Hypertension     Hypokalemia     Intervertebral disc disorder with radiculopathy of lumbosacral region     resolved: 2015    Panic attacks     Pericardial effusion     PONV (postoperative nausea and vomiting)     Radiculitis     resolved: 2015    Secondary renal hyperparathyroidism ([facility] [address] )     Vitamin D deficiency      Past Surgical History:   Procedure Laterality Date    BUNIONECTOMY      Left foot     COLON SURGERY      COLONOSCOPY  2018    DILATION AND CURETTAGE OF UTERUS      INDUCED       surgically induced    IL CORRJ HALLUX VALGUS W/SESMDC W/DIST METAR OSTEOT Left 2019    Procedure: Bunny Barefoot;  Surgeon: Mark Anthony Andrade DPM;  [facility];  Service: Podiatry    IL ERCP DX COLLECTION SPECIMEN BRUSHING/WASHING N/A 2018    Procedure: ENDOSCOPIC RETROGRADE CHOLANGIOPANCREATOGRAPHY (ERCP);   Surgeon: Jamaal Tirado MD;  [facility];  Service: Gastroenterology    IL LAP, SURG PROCTOPEXY N/A 2018    Procedure: ROBOTIC SIGMOID RESECTION / RECTOPEXY;  Surgeon: Zuleima Ramires MD;  [facility];  Service: Colorectal    IL SIGMOIDOSCOPY FLX DX W/COLLJ SPEC BR/WA IF PFRMD N/A 2018    Procedure: Austyn Johnson;  Surgeon: Zuleima Ramires MD; [facility];  Service: Colorectal    TUBAL LIGATION Bilateral 1997    US GUIDED THYROID BIOPSY  7/30/2019     Family History   Problem Relation Age of Onset    Bipolar disorder Mother     Mental illness Mother         depression    Stroke Mother     Dementia Mother     Heart disease Father     Hypertension Father     Diabetes Father     Other Family         Back disorder    Diabetes Family     Heart disease Family     Hypertension Family     Breast cancer Family     Stroke Family     Thyroid disease Family     Breast cancer Paternal Grandmother     Breast cancer Paternal [de-identified]     Breast cancer Maternal Aunt     Mental illness Sister     Mental illness Sister     Heart disease Sister     No Known Problems Sister     No Known Problems Sister     Other Son         pituitary tumor    Hypertension Son     Obesity Son     No Known Problems Son     Substance Abuse Neg Hx         neg fam hx      reports that she has never smoked  She has never used smokeless tobacco  She reports that she has current or past drug history  Drug: Marijuana  She reports that she does not drink alcohol  Physical Exam:   Vitals:    09/09/19 1157   BP: 120/82   BP Location: Left arm   Patient Position: Sitting   Cuff Size: Large   Pulse: 70   Weight: 91 6 kg (202 lb)   Height: 5' 7" (1 702 m)     Body mass index is 31 64 kg/m²      General: conscious, cooperative, in not acute distress  Eyes: conjunctivae pink, anicteric sclerae  ENT: lips and mucous membranes moist  Neck: supple, no JVD  Chest: clear breath sounds bilateral, no crackles, ronchus or wheezings  CVS: distinct S1 & S2, normal rate, regular rhythm  Abdomen: soft, non-tender, non-distended, normoactive bowel sounds  Extremities: no edema of both legs  Skin: no rash  Neuro: awake, alert, oriented        Lab Results:    Results for orders placed or performed in visit on 09/06/19   Phosphorus   Result Value Ref Range    Phosphorus 3 6 2 7 - 4 5 mg/dL   Basic metabolic panel   Result Value Ref Range    Sodium 141 136 - 145 mmol/L    Potassium 4 5 3 5 - 5 3 mmol/L    Chloride 105 100 - 108 mmol/L    CO2 27 21 - 32 mmol/L    ANION GAP 9 4 - 13 mmol/L    BUN 29 (H) 5 - 25 mg/dL    Creatinine 2 45 (H) 0 60 - 1 30 mg/dL    Glucose, Fasting 106 (H) 65 - 99 mg/dL    Calcium 9 9 8 3 - 10 1 mg/dL    eGFR 21 ml/min/1 73sq m   Lipid Panel with Direct LDL reflex   Result Value Ref Range    Cholesterol 217 (H) 50 - 200 mg/dL    Triglycerides 107 <=150 mg/dL    HDL, Direct 64 (H) 40 - 60 mg/dL    LDL Calculated 132 (H) 0 - 100 mg/dL   [place] Allergy Panel, Adult   Result Value Ref Range    A  ALTERNATA 0 39 (H) 0 00 - 0 09 kUA/I    A  FUMIGATUS <0 10 0 00 - 0 09 kUA/I    [place] Grass <0 10 0 00 - 0 09 kUA/I    [place]  <0 10 0 00 - 0 09 kUA/I    Cat Epithellium-Dander <0 10 0 00 - 0 09 kUA/I    C HERBARUM <0 10 0 00 - 0 09 kUA/I    Cockroach <0 10 0 00 - 0 09 kUA/I    Common Silver Birch <0 10 0 00 - 0 09 kUA/I    [place] <0 10 0 00 - 0 09 kUA/I    D  farinae <0 10 0 00 - 0 09 kUA/I    D  pteronyssinus <0 10 0 00 - 0 09 kUA/I    Dog Dander <0 10 0 00 - 0 09 kUA/I    Elm IgE <0 10 0 00 - 0 09 kUA/I    Mountain [place] Tree <0 10 0 00 - 0 09 kUA/I    Mugwort <0 10 0 00 - 0 09 kUA/I    [place] Tree <0 10 0 00 - 0 09 kUA/I    Oak <0 10 0 00 - 0 09 kUA/I    P CHRYSOGENUM <0 10 0 00 - 0 09 kUA/I    Rough Pigweed  IgE <0 10 0 00 - 0 09 kUA/I    Common Ragweed 0 80 (H) 0 00 - 0 09 kUA/I    Sheep Sorrel IgE <0 10 0 00 - 0 09 kUA/I    [place] Tree <0 10 0 00 - 0 09 kUA/I    Luis M Grass <0 10 0 00 - 0 09 kUA/I    [place] Tree <0 10 0 00 - 0 09 kUA/I    White Luis Tree <0 10 0 00 - 0 09 kUA/I    IgE 23 5 0 - 113 kU/l    Allergen Comment See Below     MOUSE URINE <0 10 0 00 - 0 09 kUA/I   CBC and differential   Result Value Ref Range    WBC 5 61 4 31 - 10 16 Thousand/uL    RBC 4 22 3 81 - 5 12 Million/uL    Hemoglobin 13 1 11 5 - 15 4 g/dL    Hematocrit 40 9 34 8 - 46 1 % MCV 97 82 - 98 fL    MCH 31 0 26 8 - 34 3 pg    MCHC 32 0 31 4 - 37 4 g/dL    RDW 14 0 11 6 - 15 1 %    MPV 10 5 8 9 - 12 7 fL    Platelets 611 941 - 769 Thousands/uL    nRBC 0 /100 WBCs    Neutrophils Relative 56 43 - 75 %    Immat GRANS % 0 0 - 2 %    Lymphocytes Relative 24 14 - 44 %    Monocytes Relative 8 4 - 12 %    Eosinophils Relative 11 (H) 0 - 6 %    Basophils Relative 1 0 - 1 %    Neutrophils Absolute 3 13 1 85 - 7 62 Thousands/µL    Immature Grans Absolute 0 02 0 00 - 0 20 Thousand/uL    Lymphocytes Absolute 1 32 0 60 - 4 47 Thousands/µL    Monocytes Absolute 0 44 0 17 - 1 22 Thousand/µL    Eosinophils Absolute 0 63 (H) 0 00 - 0 61 Thousand/µL    Basophils Absolute 0 07 0 00 - 0 10 Thousands/µL       Portions of the record may have been created with voice recognition software  Occasional wrong word or "sound a like" substitutions may have occurred due to the inherent limitations of voice recognition software  Read the chart carefully and recognize, using context, where substitutions have occurred  If you have any questions, please contact the dictating provider

## 2019-09-10 ENCOUNTER — OFFICE VISIT (OUTPATIENT)
Dept: PHYSICAL THERAPY | Facility: CLINIC | Age: 59
End: 2019-09-10
Payer: MEDICARE

## 2019-09-10 DIAGNOSIS — Z98.890 S/P BUNIONECTOMY: ICD-10-CM

## 2019-09-10 DIAGNOSIS — M21.612 BUNION OF GREAT TOE OF LEFT FOOT: Primary | ICD-10-CM

## 2019-09-10 PROCEDURE — 97110 THERAPEUTIC EXERCISES: CPT | Performed by: PHYSICAL THERAPIST

## 2019-09-10 PROCEDURE — 97140 MANUAL THERAPY 1/> REGIONS: CPT | Performed by: PHYSICAL THERAPIST

## 2019-09-10 NOTE — PROGRESS NOTES
Daily Note     Today's date: 9/10/2019  Patient name: Nicole Foster  : 1960  MRN: [id number]  Referring provider: Garnell Apgar, DPM  Dx:   Encounter Diagnosis     ICD-10-CM    1  Bunion of great toe of left foot M21 [id number]    2  S/P bunionectomy [id number] [id number]                   Subjective: Pt reports feeling a little more sore today from doing a lot of stairs in her home yesterday     Objective: See treatment diary below      Assessment: Tolerated treatment well  Some difficulty standing heel raises today due to pain, but tolerated other exercises well  Occasional cues needed to perform exercises properly  Would benefit from continued PT  Plan: Continue per plan of care  Progress treatment as tolerated  Daily Treatment Diary:  EPOC: 2019  Precautions: HTN (controlled w/ meds); Anxiety; Depression  Co-Morbidities: CKD III; Hx of BG CVA; Bipolar Disorder; GERD; Hyperlipidemia; Hyperparathyroidism      Manual  8/16 8/20 8/28 8/30 9/3 9/10       L Ankle PROM  JK RS JK JK DD       L Metatarsal/GT PROM    JK RS JK JK DD       L Ankle/ metatarsal mobs   RS JK JK        STM/TPR to interossei m     RS          k-tape scar   RS          Total Time  10' 15' 10' 10' 8'           Exercise Diary  8/16 8/20 8/28 8/30 9/3 9/10       HEP Instruct & Review 5'                         Recumbent bike (when able)  5' 5' 5' 5' 5'       Gastroc Stretch  30"x3 30"x3 wedge 30"x3 wedge 30"x3 wedge 30"x3 wedge       Seated HR/TR  20x  20x ea Std 10x Std  20x Std 10x    Seated 20x       Neutral Ankles  10"x10 10"x10 10"x10 10"x10 10"x10       Ankle AROM DF/PF/INV/EV  20           Toe flex/ext  20 20x ea 20x 20x 20x       Toe abd/add  20 20x ea 20x 20x 20x       Ankle pumps   HEP          TB ankle DF/PF/INV/EV   HEP          BAPS Board   NV L2 10xea L210x ea L3 10x ea       TB toe curl/ extend  Towel  20x NP Towel  20x Towel  20x Towel  20x       WS to L   10"x10 np 10"x10 weight shift       SLS (when able)    15"x5 15"x5 15"x5       Mini Squats  10x5" 10"x10 10"x10 10"x10 10"x10       clamshells     10"x10 OTB 10"x10       Gait Training                              Modalities  8/16 8/20 8/28 8/30 9/3 9/10       CP  10'  home  10' 10'

## 2019-09-12 ENCOUNTER — OFFICE VISIT (OUTPATIENT)
Dept: PHYSICAL THERAPY | Facility: CLINIC | Age: 59
End: 2019-09-12
Payer: MEDICARE

## 2019-09-12 ENCOUNTER — CLINICAL SUPPORT (OUTPATIENT)
Dept: NEPHROLOGY | Facility: CLINIC | Age: 59
End: 2019-09-12
Payer: MEDICARE

## 2019-09-12 DIAGNOSIS — M21.612 BUNION OF GREAT TOE OF LEFT FOOT: Primary | ICD-10-CM

## 2019-09-12 DIAGNOSIS — Z98.890 S/P BUNIONECTOMY: ICD-10-CM

## 2019-09-12 DIAGNOSIS — N18.4 STAGE 4 CHRONIC KIDNEY DISEASE (HCC): ICD-10-CM

## 2019-09-12 PROCEDURE — 97112 NEUROMUSCULAR REEDUCATION: CPT | Performed by: PHYSICAL THERAPY ASSISTANT

## 2019-09-12 PROCEDURE — 97802 MEDICAL NUTRITION INDIV IN: CPT | Performed by: DIETITIAN, REGISTERED

## 2019-09-12 NOTE — PROGRESS NOTES
Initial Nutrition Assessment Form    Patient Name: Savannah Maldonado    YOB: 1960    Sex: Female     Assessment Date: 9/12/2019  Start Time: 13:45 Stop Time: 14:30 Total Minutes: 39     Data:  Present at session: self   Parent Concerns:    Medical Dx/Reason for Referral:    Past Medical History:   Diagnosis Date    Ankle sprain     left    Anxiety disorder     Bipolar 2 disorder ([facility] [address] )     Chronic back pain     CKD (chronic kidney disease) stage 3, GFR 30-59 ml/min ([facility])     CVA (cerebral vascular accident) ([facility] [address] )     noted on MRI in the past    Depression     GERD (gastroesophageal reflux disease)     Hypercholesteremia     Hyperlipidemia     Hypernatremia     Hypertension     Hypokalemia     Intervertebral disc disorder with radiculopathy of lumbosacral region     resolved: 12/28/2015    Panic attacks     Pericardial effusion     PONV (postoperative nausea and vomiting)     Radiculitis     resolved: 12/28/2015    Secondary renal hyperparathyroidism ([facility])     Vitamin D deficiency        Current Outpatient Medications   Medication Sig Dispense Refill    acetaminophen (TYLENOL) 325 mg tablet Take 650 mg by mouth every 6 (six) hours as needed for mild pain      albuterol (PROAIR HFA) 90 mcg/act inhaler Inhale 2 puffs every 6 (six) hours as needed for wheezing 1 Inhaler 5    AMILoride 5 mg tablet 1 bid      amLODIPine (NORVASC) 5 mg tablet Take 1 tablet (5 mg total) by mouth 2 (two) times a day 180 tablet 3    aspirin (ECOTRIN LOW STRENGTH) 81 mg EC tablet Take 1 tablet (81 mg total) by mouth daily 30 tablet 0    atorvastatin (LIPITOR) 40 mg tablet Take 0 5 tablets (20 mg total) by mouth daily 30 tablet 3    budesonide-formoterol (SYMBICORT) 160-4 5 mcg/act inhaler Inhale 2 puffs 2 (two) times a day Rinse mouth after use   3 Inhaler 3    calcium acetate (PHOSLO) 667 mg capsule Take 1 capsule (667 mg total) by mouth 3 (three) times a day with meals 90 capsule 3    carvedilol (COREG) 3 125 mg tablet Take 1 tablet (3 125 mg total) by mouth 2 (two) times a day with meals 60 tablet 2    clonazePAM (KlonoPIN) 0 5 mg tablet TAKE 1 TABLET BY MOUTH 3  TIMES A  tablet 0    fluvoxaMINE (LUVOX) 100 mg tablet Take 100 mg by mouth 3 (three) times a day        lamoTRIgine (LaMICtal) 100 mg tablet Take 100 mg by mouth 3 (three) times a day Pt takes 3 times daily      linaCLOtide (LINZESS) 290 MCG CAPS Take 1 capsule by mouth daily for 90 days 90 capsule 3    montelukast (SINGULAIR) 10 mg tablet TAKE 1 TABLET BY MOUTH AT BEDTIME 30 tablet 11    omeprazole (PriLOSEC) 40 MG capsule TAKE 1 CAPSULE BY MOUTH  DAILY AT BEDTIME 90 capsule 3    Polyethylene Glycol 3350 (MIRALAX PO) Take by mouth as needed       psyllium (METAMUCIL) 58 6 % powder Take 1 packet by mouth daily      QUEtiapine (SEROQUEL) 300 mg tablet Take 1 tablet (300 mg total) by mouth every morning 1 in the AM     ( also takes 400 mg at bedtime) 90 tablet 3    QUEtiapine (SEROquel) 400 MG tablet 1 at bedtime       No current facility-administered medications for this visit  Additional Meds/Supplements:    Special Learning Needs:    Height:   HC Readings from Last 3 Encounters:   No data found for [facility]       Weight: Wt Readings from Last 12 Encounters:   09/09/19 91 6 kg (202 lb)   08/27/19 91 6 kg (202 lb)   08/19/19 92 kg (202 lb 14 4 oz)   08/14/19 91 9 kg (202 lb 9 6 oz)   07/16/19 90 3 kg (199 lb)   07/08/19 86 2 kg (190 lb)   07/03/19 86 2 kg (190 lb)   07/01/19 86 4 kg (190 lb 6 4 oz)   06/13/19 87 5 kg (193 lb)   06/10/19 88 kg (194 lb)   06/05/19 87 1 kg (192 lb)   06/03/19 84 4 kg (186 lb)     Estimated body mass index is 31 64 kg/m² as calculated from the following:    Height as of 9/9/19: 5' 7" (1 702 m)  Weight as of 9/9/19: 91 6 kg (202 lb)  Usual Weight: #  Ideal Body Weight: #   Recent Weight Change: ? Yes     Amount:   Weight gain      Energy Needs: No calculation needed   No Known Allergies    Social History     Substance and Sexual Activity   Alcohol Use Never    Frequency: Never    Binge frequency: Never       Social History     Tobacco Use   Smoking Status Never Smoker   Smokeless Tobacco Never Used       Who shops? patient   Who cooks? patient   Exercise: Activities of daily living   Prior Counseling? ? Yes       Why:  eating disorder        Diet Hx:      Nutrition Diagnosis:   Food and nutrition related knowledge deficit  related to CKD4, renal diet restrictions as evidenced by No prior knowledge of need for food and nutrition related recommendations       Medical Nutrition Therapy Intervention:  ?Individualized Meal Plan ? Understanding Lab Values   Other Notes: Provided and reviewed  [facility] handouts:  Sodium, protein, phosphorus, potassium  Thoroughly went through sample daily meal choices, that are compliant with pre-dialysis renal diet restrictions  Lidia Harada reports still using a salt shaker on her food, encouraged removing the salt shaker and substituting a salt free herb blend  Discussed eating the appropriate amount of protein each day, and to eat about 60g of protein daily  Lidia Harada is able to verbalize that a serving of protein is about the size of a deck of cards  Lidia Harada has started a phosphate binder, she will be compliant by taking with meals  Also, she will switch from drinking vonnie to clear sodas and bottled iced tea to homemade tea  For potassium restrictions, she will choose apple juice instead of orange juice and limit pasta / tomato sauce to once per week  Lidia Harada will ask her family to be supportive of her dietary modifications        Acceptance  Receptivity: ?Excellent  ? Excellent   Expected Compliance: ?Excellent       Goals:  1  Reduce sodium intake by:  Stop using the salt shaker at the table, choosing salt free seasoning blend instead; And by choosing reduced sodium soups or making homemade soups      2   Control phosphorus level by:  Taking Phosphate binder with meals as prescribed; And switching from vonnie / bottled iced teas to clear sodas / homemade iced tea  3        No follow-ups on file    Labs:  CMP  Lab Results   Component Value Date     02/27/2017    K 4 5 09/06/2019     09/06/2019    CO2 27 09/06/2019    BUN 29 (H) 09/06/2019    CREATININE 2 45 (H) 09/06/2019    GLUF 106 (H) 09/06/2019    CALCIUM 9 9 09/06/2019    AST 27 06/12/2019    ALT 42 06/12/2019    ALKPHOS 225 (H) 06/12/2019    EGFR 21 09/06/2019       BMP  Lab Results   Component Value Date    CALCIUM 9 9 09/06/2019     02/27/2017    K 4 5 09/06/2019    CO2 27 09/06/2019     09/06/2019    BUN 29 (H) 09/06/2019    CREATININE 2 45 (H) 09/06/2019       Lipids  No results found for: CHOL  Lab Results   Component Value Date    HDL 64 (H) 09/06/2019    HDL 68 (H) 01/16/2019    HDL 68 (H) 02/17/2018     Lab Results   Component Value Date    LDLCALC 132 (H) 09/06/2019    LDLCALC 129 (H) 01/16/2019    LDLCALC 83 02/17/2018     Lab Results   Component Value Date    TRIG 107 09/06/2019    TRIG 125 01/16/2019    TRIG 124 02/17/2018     No results found for: CHOLHDL    Hemoglobin A1C  Lab Results   Component Value Date    HGBA1C 5 5 07/02/2018       Fasting Glucose  Lab Results   Component Value Date    GLUF 106 (H) 09/06/2019       Insulin     Thyroid  No results found for: TSH, B7WJJFX, G0PRFSG, THYROIDAB    Hepatic Function Panel  Lab Results   Component Value Date    ALT 42 06/12/2019    AST 27 06/12/2019    ALKPHOS 225 (H) 06/12/2019       Celiac Disease Antibody Panel  No results found for: ENDOMYSIAL IGA, GLIADIN IGA, GLIADIN IGG, IGA, TISSUE TRANSGLUT AB, TTG IGA   Iron  Lab Results   Component Value Date    IRON 44 (L) 11/10/2017    TIBC 164 (L) 11/10/2017    FERRITIN 50 11/10/2017       Vitamins  No results found for: VITAMIN B2   No results found for: NICOTINAMIDE, NICOTINIC ACID   No results found for: [facility]  Lab Results   Component Value Date    YYCZNYSN13 284 11/10/2017     No results found for: VITB5  No results found for: T3NYWDSW  No results found for: THYROGLB  No results found for: VITAMIN K   No results found for: 25-HYDROXY VIT D   No components found for: Jessica [address], LDN  [address] [address]

## 2019-09-12 NOTE — PROGRESS NOTES
Daily Note     Today's date: 2019  Patient name: Hudson Austin  : 1960  MRN: [id number]  Referring provider: Tl Tinsley DPM  Dx:   Encounter Diagnosis     ICD-10-CM    1  Bunion of great toe of left foot M21 612    2  S/P bunionectomy [id number]                   Subjective: Pt reports she moved back into her 3rd floor BR this week and has been doing 2 flights of steps 5-6 times per day  Pt reports she is a lot more sore since increasing the amount of steps she is doing  Objective: See treatment diary below      Assessment: Tolerated treatment well  Able to progress through TE without c/o pain  Initiated biodex this session for weight shifting activities  Pt required mod verbal cues and tactile cues for weight shifting on biodex  Improved after 3 trials  Would benefit from continued PT  Plan: Continue per plan of care  Progress treatment as tolerated  Daily Treatment Diary:  EPOC: 2019  Precautions: HTN (controlled w/ meds); Anxiety; Depression  Co-Morbidities: CKD III; Hx of BG CVA; Bipolar Disorder; GERD; Hyperlipidemia; Hyperparathyroidism      Manual  8/16 8/20 8/28 8/30 9/3 9/10 9/12      L Ankle PROM  JK RS JK JK DD rk      L Metatarsal/GT PROM    JK RS JK JK DD rk      L Ankle/ metatarsal mobs   RS JK JK        STM/TPR to interossei m     RS          k-tape scar   RS          Total Time  10' 15' 10' 10' 8'           Exercise Diary  8/16 8/20 8/28 8/30 9/3 9/10 9/12      HEP Instruct & Review 5'                         Recumbent bike (when able)  5' 5' 5' 5' 5' 5'      Gastroc Stretch  30"x3 30"x3 wedge 30"x3 wedge 30"x3 wedge 30"x3 wedge 30"x3 wedge      Seated HR/TR  20x  20x ea Std 10x Std  20x Std 10x    Seated 20x Stand 2x10      Neutral Ankles  10"x10 10"x10 10"x10 10"x10 10"x10 10"x10      Ankle AROM DF/PF/INV/EV  20           Toe flex/ext  20 20x ea 20x 20x 20x 20x      Toe abd/add  20 20x ea 20x 20x 20x 20x      Ankle pumps   HEP          TB ankle DF/PF/INV/EV   HEP          BAPS Board   NV L2 10xea L210x ea L3 10x ea L3 10x ea      TB toe curl/ extend  Towel  20x NP Towel  20x Towel  20x Towel  20x Towel  20x      WS to L   10"x10 np 10"x10 weight shift biodex LOS x 3 trials      SLS (when able)    15"x5 15"x5 15"x5 15"x5      Mini Squats  10x5" 10"x10 10"x10 10"x10 10"x10 10"x10      clamshells     10"x10 OTB 10"x10 OTB  10"x10      Gait Training                              Modalities  8/16 8/20 8/28 8/30 9/3 9/10 9/12      CP  10'  home  10' 10' 10'

## 2019-09-16 ENCOUNTER — OFFICE VISIT (OUTPATIENT)
Dept: PODIATRY | Facility: CLINIC | Age: 59
End: 2019-09-16
Payer: MEDICARE

## 2019-09-16 VITALS
DIASTOLIC BLOOD PRESSURE: 81 MMHG | SYSTOLIC BLOOD PRESSURE: 121 MMHG | HEIGHT: 67 IN | WEIGHT: 202 LBS | BODY MASS INDEX: 31.71 KG/M2

## 2019-09-16 DIAGNOSIS — M21.612 BUNION OF GREAT TOE OF LEFT FOOT: Primary | ICD-10-CM

## 2019-09-16 PROCEDURE — 99024 POSTOP FOLLOW-UP VISIT: CPT | Performed by: PODIATRIST

## 2019-09-16 NOTE — PROGRESS NOTES
This patient was seen on 9/16/19  Assessment:    Problem List Items Addressed This Visit        Musculoskeletal and Integument    Bunion of great toe of left foot - Primary          Plan:  1  Continue PT  2  Return PRN for Left foot pain or to correct contralateral deformity     Diagnoses and all orders for this visit:    Bunion of great toe of left foot          Subjective:      Patient ID: Keshia Jones is a 64 y o  female  Alex Brody is here for a check of her post-bunion correction site  She seems very pleased with the result  She is going to PT which she feels was helpful  Pain in the foot is much reduced since the surgery  The following portions of the patient's history were reviewed and updated as appropriate: allergies, current medications, past family history, past medical history, past social history, past surgical history and problem list     Review of Systems   Constitutional: Negative  Respiratory: Negative  Cardiovascular: Negative  Gastrointestinal: Negative  Genitourinary: Negative for difficulty urinating  Musculoskeletal: Negative  Neurological: Negative  Hematological: Negative  Psychiatric/Behavioral: Negative  Objective:      /81   Ht 5' 7" (1 702 m)   Wt 91 6 kg (202 lb)   LMP  (LMP Unknown)   BMI 31 64 kg/m²          Physical Exam   Constitutional: She is oriented to person, place, and time  She appears well-developed and well-nourished  No distress  Neck: Normal range of motion  Cardiovascular:   Pulses:       Dorsalis pedis pulses are 3+ on the right side, and 3+ on the left side  Posterior tibial pulses are 3+ on the right side, and 3+ on the left side  Pulmonary/Chest: Effort normal and breath sounds normal    Abdominal: Soft  Bowel sounds are normal    Musculoskeletal: Normal range of motion  Feet:   Right Foot:   Protective Sensation: 10 sites tested  10 sites sensed     Left Foot:   Protective Sensation: 10 sites sensed  Neurological: She is alert and oriented to person, place, and time  Skin: Skin is warm and dry  She is not diaphoretic  Nursing note and vitals reviewed  The Left bunion incision is well-healed without hypertrophic scarring  Minimal edema still present  ROM of the first metatarsophalangeal joint is WNL without crepitus  The bunion deformity is well-corrected

## 2019-09-17 ENCOUNTER — OFFICE VISIT (OUTPATIENT)
Dept: PHYSICAL THERAPY | Facility: CLINIC | Age: 59
End: 2019-09-17
Payer: MEDICARE

## 2019-09-17 DIAGNOSIS — Z98.890 S/P BUNIONECTOMY: ICD-10-CM

## 2019-09-17 DIAGNOSIS — M21.612 BUNION OF GREAT TOE OF LEFT FOOT: Primary | ICD-10-CM

## 2019-09-17 PROCEDURE — 97110 THERAPEUTIC EXERCISES: CPT

## 2019-09-17 PROCEDURE — 97140 MANUAL THERAPY 1/> REGIONS: CPT

## 2019-09-17 NOTE — PROGRESS NOTES
Daily Note     Today's date: 2019  Patient name: Mortimer Auer  : 1960  MRN: [id number]  Referring provider: Marcella Justice DPM  Dx:   Encounter Diagnosis     ICD-10-CM    1  Bunion of great toe of left foot M21 612    2  S/P bunionectomy [id number]                   Subjective: Pt reports last week she was a little sore from going up 3 flights of steps to sleep  Reports feeling good this week and has been DC'd by the surgeon from his service  Pt to finish out scheduled PT appts  Objective: See treatment diary below      Assessment: Tolerated treatment well  Patient exhibited good technique with therapeutic exercises and would benefit from continued PT for cont'd work on proprioception  Plan: Continue per plan of care  Progress treatment as tolerated  Daily Treatment Diary:  EPOC: 2019  Precautions: HTN (controlled w/ meds); Anxiety; Depression  Co-Morbidities: CKD III; Hx of BG CVA; Bipolar Disorder; GERD; Hyperlipidemia; Hyperparathyroidism      Manual  8/16 8/20 8/28 8/30 9/3 9/10 9/12 9/17     L Ankle PROM  JK RS JK JK DD rk JK     L Metatarsal/GT PROM    JK RS JK JK DD rk JK     L Ankle/ metatarsal mobs   RS JK JK        STM/TPR to interossei m     RS          k-tape scar   RS     JK     Total Time  10' 15' 10' 10' 8'  10'         Exercise Diary  8/16 8/20 8/28 8/30 9/3 9/10 9/12 9/17     HEP Instruct & Review 5'                         Recumbent bike (when able)  5' 5' 5' 5' 5' 5' 5'     Gastroc Stretch  30"x3 30"x3 wedge 30"x3 wedge 30"x3 wedge 30"x3 wedge 30"x3 wedge 30"x3  wedge     Seated HR/TR  20x  20x ea Std 10x Std  20x Std 10x    Seated 20x Stand 2x10 Std  20x     Neutral Ankles  10"x10 10"x10 10"x10 10"x10 10"x10 10"x10 10"x10     Ankle AROM DF/PF/INV/EV  20           Toe flex/ext  20 20x ea 20x 20x 20x 20x 20x     Toe abd/add  20 20x ea 20x 20x 20x 20x 20x     Ankle pumps   HEP          TB ankle DF/PF/INV/EV   HEP          BAPS Board   NV L2 10xea L210x ea L3 10x ea L3 10x ea L2 std  10x ea     TB toe curl/ extend  Towel  20x NP Towel  20x Towel  20x Towel  20x Towel  20x Towel 20x     WS to L   10"x10 np 10"x10 weight shift biodex LOS x 3 trials np     SLS (when able)    15"x5 15"x5 15"x5 15"x5 15"x5 GTF    Mini Squats  10x5" 10"x10 10"x10 10"x10 10"x10 10"x10 10"[id number]     clamshells     10"x10 OTB 10"x10 OTB  10"x10 nv     Gait Training                              Modalities  8/16 8/20 8/28 8/30 9/3 9/10 9/12 9/17     CP  10'  home  10' 10' 10' 10'

## 2019-09-18 ENCOUNTER — TELEPHONE (OUTPATIENT)
Dept: NEPHROLOGY | Facility: CLINIC | Age: 59
End: 2019-09-18

## 2019-09-18 NOTE — TELEPHONE ENCOUNTER
Given her advanced CKD supplement likely has high levels of protein which I would not advise    I would avoid dietary supplements at this time thanks

## 2019-09-18 NOTE — TELEPHONE ENCOUNTER
Pt called wanting to know if Dr Daniel Gaytan would be okay with her taking a dietary supplement called KETO to help her lose weight  Please advise

## 2019-09-19 ENCOUNTER — EVALUATION (OUTPATIENT)
Dept: PHYSICAL THERAPY | Facility: CLINIC | Age: 59
End: 2019-09-19
Payer: MEDICARE

## 2019-09-19 DIAGNOSIS — F31.81 BIPOLAR 2 DISORDER (HCC): Chronic | ICD-10-CM

## 2019-09-19 DIAGNOSIS — Z98.890 S/P BUNIONECTOMY: ICD-10-CM

## 2019-09-19 DIAGNOSIS — M21.612 BUNION OF GREAT TOE OF LEFT FOOT: Primary | ICD-10-CM

## 2019-09-19 PROCEDURE — 97110 THERAPEUTIC EXERCISES: CPT | Performed by: PHYSICAL THERAPIST

## 2019-09-19 PROCEDURE — 97140 MANUAL THERAPY 1/> REGIONS: CPT | Performed by: PHYSICAL THERAPIST

## 2019-09-19 NOTE — PROGRESS NOTES
PT Discharge    Today's date: 2019  Patient name: Lisette Rosas  : 1960  MRN: [id number]  Referring provider: Doreen Fletcher DPM  Dx:   Encounter Diagnosis     ICD-10-CM    1  Bunion of great toe of left foot M21 612    2  S/P bunionectomy [id number]            Assessment  Assessment details: Eliud Alvarado is a 64 y o female being treated as an outpatient at [facility] s/p L bunionectomy on 2019  Since IE, pt has made strong and steady gains in activity tolerance/function, pain/edema reduction, ROM, and strength, and feels confident in ability to transition to updated HEP at this time  Thank you  Impairments: abnormal muscle tone and pain with function  Understanding of Dx/Px/POC: good   Prognosis: good    Goals  Short Term Goals;  1  Pt will be independent in HEP in 1-2 weeks  - met  2  Pt will inc ROM by 25% in 4-6 weeks  - Partially Met  3  Decrease pain to 3-4 worst severity w/ stairs in 4-6 weeks  - Met    Long Term Goals:  1  Pt will achieve ROM WNL by d/c  - Partially Met  2  Pt will achieve 5/5 strength in all deficient areas by d/c - met  3  Decrease pain to 1-2/10 worst severity by d/c - Partially met  4  Pt will return to max function by d/c - Met      Plan  Planned therapy interventions: home exercise program  Frequency: d/c from skilled PT services; transition to updated HEP  Treatment plan discussed with: patient        Subjective Evaluation    History of Present Illness  Mechanism of injury: CURRENT LEVEL  Pt reports strong and steady gains since IE  Pt reports that she has gradually progressed to wearing sneakers for the entire day w/o pain  Pt denies any trouble w/ stair climbing- reports being able to negotiate in reciprocating fashion w/o LOB  Pt also reports being able to tolerate longer periods of ambulation w/o pain  She denies any major limitations at home and reports that she has been d/c'd by surgeon       PREVIOUS LEVEL  Pt reports to IE wearing post op walking shoe on L foot s/p L bunionectomy on 2019  Pt denies complications w/ surgery  Pt reports pain travels into the dorsal aspect of the foot  Pt reports numbness/paresthesias surrounding incision site and plantar aspect of foot  Pt reports a history of depression, however reports she is currently professionally treated  Pt denies suicidal thoughts/behaviors  Pain  Current pain ratin  At worst pain ratin  Location: L foot  Relieving factors: ice (Elevating; tylenol prn)  Exacerbated by: walking >30 minutes;; pt reports sleeping is not affected  Social Support  Steps to enter house: no  Stairs in house: yes (3 FF w/ railing )   Lives in: multiple-level home  Lives with: sister  Employment status: not working  Patient Goals  Patient goals for therapy: decreased pain and increased motion (Met)  Patient's goals regarding treatment: Pt wants to return to walking > 3x a week - Met  Objective     Active Range of Motion   Left Ankle/Foot   Dorsiflexion (ke): 12 degrees   Dorsiflexion (kf): 12 degrees   Plantar flexion: 70 degrees   Inversion: 16 degrees   Eversion: 12 degrees   Great toe flexion: 40 degrees   Great toe extension: 40 degrees     Passive Range of Motion   Left Ankle/Foot    Dorsiflexion (ke): 20 degrees with pain  Dorsiflexion (kf): 18 degrees   Plantar flexion: 74 degrees   Inversion: 22 degrees   Eversion: 20 degrees with pain  Great toe flexion: 44 degrees   Great toe extension: 60 degrees with pain    Additional Passive Range of Motion Details  Decreased metatarsal mobility between 1st and 2nd digits     Strength/Myotome Testing     Left Ankle/Foot   Dorsiflexion: 5  Plantar flexion: 5  Inversion: 5  Eversion: 5  Great toe flexion: 5  Great toe extension: 5    General Comments:       Ankle/Foot Comments   Observation:  Incision 4 cm along 1st metatarsal, well healed w/o s/s of infection; no observable edema       Circumferential Girth Measurement (L/R)  Figure 8: 51cm/50 5cm    Palpation (L):  TTP: along dorsal aspect of distal 3rd met, plantar surface great toe; no longer tenderness w/ palpation to plantar surface of great toe  Hypertonicity: L Gastroc/soleus     Min adhesions palpated t/o proximal incision        Daily Treatment Diary:  [facility]: 9/27/2019  Precautions: HTN (controlled w/ meds); Anxiety; Depression  Co-Morbidities: CKD III; Hx of BG CVA; Bipolar Disorder; GERD; Hyperlipidemia;  Hyperparathyroidism      Manual  9/3 9/10 9/12 9/17 9/19    L Ankle PROM JK DD rk JK RS    L Metatarsal/GT PROM   JK DD rk JK RS    L Ankle/ metatarsal mobs JK    RS    STM/TPR to interossei m          k-tape scar    JK RS    Progress Note     RS    Total Time 10' 8'  10' 25'    0    Exercise Diary  9/3 9/10 9/12 9/17 9/19 (modified ex due to time constraints w/ progress note)    HEP Instruct & Review                  Recumbent bike (when able) 5' 5' 5' 5' 5'    Gastroc Stretch 30"x3 wedge 30"x3 wedge 30"x3 wedge 30"x3  wedge 30"x3 wedge    Seated HR/TR Std  20x Std 10x    Seated 20x Stand 2x10 Std  20x Std 20x ea    Neutral Ankles 10"x10 10"x10 10"x10 10"x10     Ankle AROM DF/PF/INV/EV         Toe flex/ext 20x 20x 20x 20x     Toe abd/add 20x 20x 20x 20x     Ankle pumps         TB ankle DF/PF/INV/EV         BAPS Board L210x ea L3 10x ea L3 10x ea L2 std  10x ea     TB toe curl/ extend Towel  20x Towel  20x Towel  20x Towel 20x     WS to L 10"x10 weight shift biodex LOS x 3 trials np     SLS (when able) 15"x5 15"x5 15"x5 15"x5 GTF 15"x3 ea    Mini Squats 10"x10 10"x10 10"x10 10"[id number]     clamshells 10"x10 OTB 10"x10 OTB  10"x10 nv     Gait Training                      Modalities  9/3 9/10 9/12 9/17 9/19    CP  10' 10' 10' 10' 10'

## 2019-09-20 ENCOUNTER — HOSPITAL ENCOUNTER (OUTPATIENT)
Dept: NON INVASIVE DIAGNOSTICS | Facility: HOSPITAL | Age: 59
Discharge: HOME | End: 2019-09-20
Attending: INTERNAL MEDICINE
Payer: MEDICARE

## 2019-09-20 DIAGNOSIS — R60.1 GENERALIZED EDEMA: ICD-10-CM

## 2019-09-20 DIAGNOSIS — N18.4 STAGE 4 CHRONIC KIDNEY DISEASE (HCC): ICD-10-CM

## 2019-09-20 PROCEDURE — G0365 VESSEL MAPPING HEMO ACCESS: HCPCS

## 2019-09-20 RX ORDER — CLONAZEPAM 0.5 MG/1
TABLET ORAL
Qty: 270 TABLET | OUTPATIENT
Start: 2019-09-20

## 2019-09-23 PROCEDURE — G0365 VESSEL MAPPING HEMO ACCESS: HCPCS | Performed by: SURGERY

## 2019-09-24 ENCOUNTER — APPOINTMENT (OUTPATIENT)
Dept: PHYSICAL THERAPY | Facility: CLINIC | Age: 59
End: 2019-09-24
Payer: MEDICARE

## 2019-09-25 ENCOUNTER — CONSULT (OUTPATIENT)
Dept: VASCULAR SURGERY | Facility: CLINIC | Age: 59
End: 2019-09-25
Payer: MEDICARE

## 2019-09-25 VITALS
SYSTOLIC BLOOD PRESSURE: 148 MMHG | HEIGHT: 67 IN | HEART RATE: 91 BPM | TEMPERATURE: 100.7 F | DIASTOLIC BLOOD PRESSURE: 88 MMHG | WEIGHT: 209 LBS | BODY MASS INDEX: 32.8 KG/M2

## 2019-09-25 DIAGNOSIS — N18.4 STAGE 4 CHRONIC KIDNEY DISEASE (HCC): ICD-10-CM

## 2019-09-25 DIAGNOSIS — N18.6 END STAGE RENAL DISEASE (HCC): Primary | ICD-10-CM

## 2019-09-25 DIAGNOSIS — R60.1 GENERALIZED EDEMA: ICD-10-CM

## 2019-09-25 PROCEDURE — 99203 OFFICE O/P NEW LOW 30 MIN: CPT | Performed by: SURGERY

## 2019-09-25 RX ORDER — OXYCODONE HYDROCHLORIDE AND ACETAMINOPHEN 5; 325 MG/1; MG/1
1 TABLET ORAL EVERY 4 HOURS PRN
Qty: 10 TABLET | Refills: 0 | Status: SHIPPED | OUTPATIENT
Start: 2019-09-25 | End: 2020-01-08 | Stop reason: ALTCHOICE

## 2019-09-25 RX ORDER — CHLORHEXIDINE GLUCONATE 0.12 MG/ML
15 RINSE ORAL ONCE
Status: CANCELLED | OUTPATIENT
Start: 2019-09-25 | End: 2019-09-25

## 2019-09-25 NOTE — PATIENT INSTRUCTIONS
End-stage renal disease pulses access  She is right-handed and on exam possibility of a left wrist AV fistula versus left upper arm which will be determined at the time of surgery with on-table ultrasound evaluation      Plan:  Left wrist versus left upper arm AV fistula sometime in the near future [address]

## 2019-09-25 NOTE — ASSESSMENT & PLAN NOTE
End-stage renal disease pulses access  She is right-handed and on exam possibility of a left wrist AV fistula versus left upper arm which will be determined at the time of surgery with on-table ultrasound evaluation      Plan:  Left wrist versus left upper arm AV fistula sometime in the near future [address]

## 2019-09-25 NOTE — LETTER
September 25, 2019     Shiraz Galindo, [address]  [address] [address]    Patient: Aniya Rowan   YOB: 1960   Date of Visit: 9/25/2019       Dear Dr Shasta Greene: Thank you for referring Darvin Zimmerman to me for evaluation  Below are my notes for this consultation  If you have questions, please do not hesitate to call me  I look forward to following your patient along with you           Sincerely,        Barbara Gabriel MD        CC: Roshni Bianchi, DO

## 2019-09-26 ENCOUNTER — APPOINTMENT (OUTPATIENT)
Dept: PHYSICAL THERAPY | Facility: CLINIC | Age: 59
End: 2019-09-26
Payer: MEDICARE

## 2019-10-02 ENCOUNTER — OFFICE VISIT (OUTPATIENT)
Dept: FAMILY MEDICINE CLINIC | Facility: HOSPITAL | Age: 59
End: 2019-10-02
Payer: MEDICARE

## 2019-10-02 VITALS
DIASTOLIC BLOOD PRESSURE: 88 MMHG | HEIGHT: 67 IN | BODY MASS INDEX: 32.02 KG/M2 | HEART RATE: 94 BPM | SYSTOLIC BLOOD PRESSURE: 140 MMHG | WEIGHT: 204 LBS

## 2019-10-02 DIAGNOSIS — N18.6 END STAGE RENAL DISEASE (HCC): ICD-10-CM

## 2019-10-02 DIAGNOSIS — M21.612 BUNION OF GREAT TOE OF LEFT FOOT: ICD-10-CM

## 2019-10-02 DIAGNOSIS — I10 ESSENTIAL HYPERTENSION: ICD-10-CM

## 2019-10-02 DIAGNOSIS — R79.89 ELEVATED LFTS: ICD-10-CM

## 2019-10-02 DIAGNOSIS — E66.9 OBESITY (BMI 30.0-34.9): ICD-10-CM

## 2019-10-02 DIAGNOSIS — E78.00 HYPERCHOLESTEREMIA: Chronic | ICD-10-CM

## 2019-10-02 DIAGNOSIS — E21.3 HYPERPARATHYROIDISM (HCC): ICD-10-CM

## 2019-10-02 DIAGNOSIS — Z23 NEED FOR INFLUENZA VACCINATION: Primary | ICD-10-CM

## 2019-10-02 DIAGNOSIS — I63.9 INFARCTION OF LEFT BASAL GANGLIA (HCC): ICD-10-CM

## 2019-10-02 DIAGNOSIS — F31.4 BIPOLAR 1 DISORDER, DEPRESSED, SEVERE (HCC): ICD-10-CM

## 2019-10-02 DIAGNOSIS — E22.1 HYPERPROLACTINEMIA (HCC): ICD-10-CM

## 2019-10-02 DIAGNOSIS — E55.9 VITAMIN D DEFICIENCY: ICD-10-CM

## 2019-10-02 PROBLEM — S80.01XA CONTUSION OF RIGHT KNEE: Status: RESOLVED | Noted: 2019-05-02 | Resolved: 2019-10-02

## 2019-10-02 PROCEDURE — G0008 ADMIN INFLUENZA VIRUS VAC: HCPCS

## 2019-10-02 PROCEDURE — 99214 OFFICE O/P EST MOD 30 MIN: CPT | Performed by: INTERNAL MEDICINE

## 2019-10-02 PROCEDURE — 90682 RIV4 VACC RECOMBINANT DNA IM: CPT

## 2019-10-02 RX ORDER — ATORVASTATIN CALCIUM 40 MG/1
40 TABLET, FILM COATED ORAL DAILY
Qty: 30 TABLET | Refills: 5 | Status: SHIPPED | OUTPATIENT
Start: 2019-10-02 | End: 2020-07-07 | Stop reason: SDUPTHER

## 2019-10-02 NOTE — ASSESSMENT & PLAN NOTE
Had surgery in July with Dr Pietro Loredo- is back to wearing the foot boot- had increased pain on steps  Discussed using shoe with support and straps in place- felt she bruised on toe after wearing sneaker

## 2019-10-02 NOTE — ASSESSMENT & PLAN NOTE
Pt reports she is going to be set up for left arm fistula in future with Dr Hanh Alicea  follows with Dr Jesusita Pedro in December- is on monthly labs with him

## 2019-10-02 NOTE — PROGRESS NOTES
Assessment/Plan:             Problem List Items Addressed This Visit        Endocrine    Hyperprolactinemia ([facility] [address] )    Hyperparathyroidism ([facility] [address] )       Cardiovascular and Mediastinum    Essential hypertension    Infarction of left basal ganglia (HCC)     No new changes            Musculoskeletal and Integument    Bunion of great toe of left foot     Had surgery in July with Dr Mary Wilson- is back to wearing the foot boot- had increased pain on steps  Discussed using shoe with support and straps in place- felt she bruised on toe after wearing sneaker  Genitourinary    End stage renal disease ([facility] [address] )     Pt reports she is going to be set up for left arm fistula in future with Dr Jeanine Will  follows with Dr Lola Eduardo in December- is on monthly labs with him  Other    Hypercholesteremia (Chronic)    Vitamin D deficiency    Bipolar 1 disorder, depressed, severe (HCC)     Seeing Dr Saniya Snow and Kamini Cutler for therapy at [facility]         Obesity (BMI 30 0-34 9)      Other Visit Diagnoses     Need for influenza vaccination    -  Primary    Relevant Orders    influenza vaccine, [id number], quadrivalent, recombinant, PF, 0 5 mL, for patients 18 yr+ (FLUBLOK)            Subjective:      Patient ID: Zaida Last is a 64 y o  female    1  Left arm bruising- no known injury- on baby aspirin daily for cva prevention    Arm Injury    There was no injury mechanism  The pain is present in the left forearm  The following portions of the patient's history were reviewed and updated as appropriate: allergies, current medications and problem list      Review of Systems   Hematological: Bruises/bleeds easily  All other systems reviewed and are negative          Objective:      Current Outpatient Medications:     acetaminophen (TYLENOL) 325 mg tablet, Take 650 mg by mouth every 6 (six) hours as needed for mild pain, Disp: , Rfl:     albuterol (PROAIR HFA) 90 mcg/act inhaler, Inhale 2 puffs every 6 (six) hours as needed for wheezing, Disp: 1 Inhaler, Rfl: 5    AMILoride 5 mg tablet, 1 bid, Disp: , Rfl:     amLODIPine (NORVASC) 5 mg tablet, Take 1 tablet (5 mg total) by mouth 2 (two) times a day, Disp: 180 tablet, Rfl: 3    aspirin (ECOTRIN LOW STRENGTH) 81 mg EC tablet, Take 1 tablet (81 mg total) by mouth daily, Disp: 30 tablet, Rfl: 0    atorvastatin (LIPITOR) 40 mg tablet, Take 0 5 tablets (20 mg total) by mouth daily, Disp: 30 tablet, Rfl: 3    budesonide-formoterol (SYMBICORT) 160-4 5 mcg/act inhaler, Inhale 2 puffs 2 (two) times a day Rinse mouth after use , Disp: 3 Inhaler, Rfl: 3    calcium acetate (PHOSLO) 667 mg capsule, Take 1 capsule (667 mg total) by mouth 3 (three) times a day with meals, Disp: 90 capsule, Rfl: 3    carvedilol (COREG) 3 125 mg tablet, Take 1 tablet (3 125 mg total) by mouth 2 (two) times a day with meals, Disp: 60 tablet, Rfl: 2    clonazePAM (KlonoPIN) 0 5 mg tablet, TAKE 1 TABLET BY MOUTH 3  TIMES A DAY, Disp: 270 tablet, Rfl: 0    fluvoxaMINE (LUVOX) 100 mg tablet, Take 100 mg by mouth 3 (three) times a day  , Disp: , Rfl:     lamoTRIgine (LaMICtal) 100 mg tablet, Take 100 mg by mouth 3 (three) times a day Pt takes 3 times daily, Disp: , Rfl:     linaCLOtide (LINZESS) 290 MCG CAPS, Take 1 capsule by mouth daily for 90 days, Disp: 90 capsule, Rfl: 3    montelukast (SINGULAIR) 10 mg tablet, TAKE 1 TABLET BY MOUTH AT BEDTIME, Disp: 30 tablet, Rfl: 11    omeprazole (PriLOSEC) 40 MG capsule, TAKE 1 CAPSULE BY MOUTH  DAILY AT BEDTIME, Disp: 90 capsule, Rfl: 3    oxyCODONE-acetaminophen (PERCOCET) 5-325 mg per tablet, Take 1 tablet by mouth every 4 (four) hours as needed for moderate painMax Daily Amount: 6 tablets, Disp: 10 tablet, Rfl: 0    Polyethylene Glycol 3350 (MIRALAX PO), Take by mouth as needed , Disp: , Rfl:     psyllium (METAMUCIL) 58 6 % powder, Take 1 packet by mouth daily, Disp: , Rfl:     QUEtiapine (SEROQUEL) 300 mg tablet, Take 1 tablet (300 mg total) by mouth every morning 1 in the AM     ( also takes 400 mg at bedtime), Disp: 90 tablet, Rfl: 3    QUEtiapine (SEROquel) 400 MG tablet, 1 at bedtime, Disp: , Rfl:     Blood pressure 140/88, pulse 94, height 5' 7" (1 702 m), weight 92 5 kg (204 lb), not currently breastfeeding  Physical Exam   Constitutional: She is oriented to person, place, and time  No distress  HENT:   Head: Normocephalic  Right Ear: External ear normal    Left Ear: External ear normal     Minimal swelling of turbinates   Eyes: Right eye exhibits no discharge  Left eye exhibits no discharge  Neck: No thyromegaly present  Cardiovascular: Normal rate and regular rhythm  Exam reveals no friction rub  No murmur heard  No rub or murmur- no chest wall tenderness   Pulmonary/Chest: Effort normal and breath sounds normal  No stridor  No respiratory distress  She has no wheezes  Abdominal: Soft  Bowel sounds are normal  She exhibits no distension  There is no tenderness  Musculoskeletal: She exhibits no edema or deformity  Lymphadenopathy:     She has no cervical adenopathy  Neurological: She is alert and oriented to person, place, and time  No cranial nerve deficit  Skin: No erythema  Ecchymosis on inner left forearm- no hematoma or firmness   Nursing note and vitals reviewed

## 2019-10-08 ENCOUNTER — PREP FOR PROCEDURE (OUTPATIENT)
Dept: VASCULAR SURGERY | Facility: CLINIC | Age: 59
End: 2019-10-08

## 2019-10-08 ENCOUNTER — TELEPHONE (OUTPATIENT)
Dept: VASCULAR SURGERY | Facility: CLINIC | Age: 59
End: 2019-10-08

## 2019-10-08 NOTE — TELEPHONE ENCOUNTER
S/w pt and scheduled her procedure for 11-18-19 at [facility] with Dr Ronnie Ruffin  She is aware nothing to eat or drink after midnight on 11-17-19  She will go to a [facility] facility for her blood work

## 2019-10-09 ENCOUNTER — TELEPHONE (OUTPATIENT)
Dept: FAMILY MEDICINE CLINIC | Facility: HOSPITAL | Age: 59
End: 2019-10-09

## 2019-10-09 DIAGNOSIS — M25.562 ACUTE PAIN OF LEFT KNEE: Primary | ICD-10-CM

## 2019-10-10 DIAGNOSIS — E87.0 HYPERNATREMIA: ICD-10-CM

## 2019-10-11 ENCOUNTER — TELEPHONE (OUTPATIENT)
Dept: GASTROENTEROLOGY | Facility: CLINIC | Age: 59
End: 2019-10-11

## 2019-10-11 DIAGNOSIS — E83.39 HYPERPHOSPHATEMIA: ICD-10-CM

## 2019-10-11 RX ORDER — AMILORIDE HYDROCHLORIDE 5 MG/1
TABLET ORAL
Qty: 120 TABLET | Refills: 3 | Status: SHIPPED | OUTPATIENT
Start: 2019-10-11 | End: 2019-10-30 | Stop reason: SDUPTHER

## 2019-10-11 NOTE — TELEPHONE ENCOUNTER
Pt states she is currently taking Linzess and cannot afford it/can't get on Pt assistance   CB# [phone] to discuss samples or alternative

## 2019-10-14 ENCOUNTER — HOSPITAL ENCOUNTER (OUTPATIENT)
Dept: RADIOLOGY | Facility: HOSPITAL | Age: 59
Discharge: HOME | End: 2019-10-14
Attending: INTERNAL MEDICINE
Payer: MEDICARE

## 2019-10-14 DIAGNOSIS — M25.562 ACUTE PAIN OF LEFT KNEE: ICD-10-CM

## 2019-10-14 PROCEDURE — 73562 X-RAY EXAM OF KNEE 3: CPT

## 2019-10-14 NOTE — TELEPHONE ENCOUNTER
I called patient, left vm advising I have samples at  for   In the mean time I also called [facility] to initiate a tier exception (currently tier 3) Pending Case# [id number]

## 2019-10-15 ENCOUNTER — OFFICE VISIT (OUTPATIENT)
Dept: OBGYN CLINIC | Facility: CLINIC | Age: 59
End: 2019-10-15
Payer: MEDICARE

## 2019-10-15 VITALS
BODY MASS INDEX: 32.8 KG/M2 | HEART RATE: 80 BPM | DIASTOLIC BLOOD PRESSURE: 90 MMHG | SYSTOLIC BLOOD PRESSURE: 140 MMHG | WEIGHT: 209 LBS | HEIGHT: 67 IN

## 2019-10-15 DIAGNOSIS — M22.2X2 PATELLOFEMORAL SYNDROME OF LEFT KNEE: Primary | ICD-10-CM

## 2019-10-15 PROCEDURE — 99213 OFFICE O/P EST LOW 20 MIN: CPT | Performed by: ORTHOPAEDIC SURGERY

## 2019-10-15 NOTE — TELEPHONE ENCOUNTER
I emailed patient asking if she has ever signed up for the [employer] Patient Assistance Program? She could be eligible for medicine at no cost  Awaiting response  In the mean time she was provided with samples  In trying to gather clinical for PA: Hx drug induced constipation    Drug she has tried:  Movantik 12/8/17  Amitiza 24 mcg BID Dec 2017  Linzess 145 mcg   Lactulose previous GI before 2017  Metamucil, fiber  Miralax  Equate laxatives QID  Mineral oil ( capful)  Admitted to hospital 2017 [facility] from laxative abuse

## 2019-10-15 NOTE — PROGRESS NOTES
Assessment:     1  Patellofemoral syndrome of left knee        Plan:  The patient was seen and examined by Dr Sydnee Badillo and myself  Problem List Items Addressed This Visit        Musculoskeletal and Integument    Patellofemoral syndrome of left knee - Primary     Findings consistent with left knee patellofemoral syndrome  Findings and treatment options were discussed with the patient  X-rays were reviewed with her  Recommend formal physical therapy for VMO strengthening  She was prescribed a patella stabilizing brace to use with activities as needed  Continue icing and Tylenol as needed for pain  She is to avoid activities such as repetitive kneeling, squatting and stair climbing  Follow-up in 6 weeks for re-evaluation  All questions were answered to patient's satisfaction  Relevant Orders    Ambulatory referral to Physical Therapy    Durable Medical Equipment         Subjective:     Patient ID: Jyoti Guerra is a 64 y o  female  Chief Complaint: This is a 16-year-old white female complaining of left knee pain for the past 2 weeks  She denies any injury or change in activities  She states the pain came on suddenly 1 day  The pain is localized over the anterior aspect of her knee  She has been having difficulty with stair climbing since  She denies any locking or catching  She feels a grinding sensation in her left knee, and sometimes feels it is going to give out on her  No treatment as of yet  Her PCP sent her for an x-ray  No issues with that knee in the past   Patient intake form was reviewed today       Allergy:  No Known Allergies  Medications:  all current active meds have been reviewed  Past Medical History:  Past Medical History:   Diagnosis Date    Ankle sprain     left    Anxiety disorder     Bipolar 2 disorder ([facility])     Chronic back pain     CKD (chronic kidney disease) stage 3, GFR 30-59 ml/min ([facility])     stage 4 (as per pt)    CVA (cerebral vascular accident) ([facility] [address] ) noted on MRI in the past    Depression     GERD (gastroesophageal reflux disease)     Hypercholesteremia     Hyperlipidemia     Hypernatremia     Hypertension     Hypokalemia     Intervertebral disc disorder with radiculopathy of lumbosacral region     resolved: 2015    Panic attacks     Pericardial effusion     PONV (postoperative nausea and vomiting)     Radiculitis     resolved: 2015    Secondary renal hyperparathyroidism ([facility] [address] )     Vitamin D deficiency      Past Surgical History:  Past Surgical History:   Procedure Laterality Date    BUNIONECTOMY      Left foot     COLON SURGERY      COLONOSCOPY  2018    DILATION AND CURETTAGE OF UTERUS      INDUCED       surgically induced    RI CORRJ HALLUX VALGUS W/SESMDC W/DIST METAR OSTEOT Left 2019    Procedure: Belynda Host;  Surgeon: Bimal Palencia DPM;  [facility];  Service: Podiatry    RI ERCP DX COLLECTION SPECIMEN BRUSHING/WASHING N/A 2018    Procedure: ENDOSCOPIC RETROGRADE CHOLANGIOPANCREATOGRAPHY (ERCP);   Surgeon: Popeye Garcia MD;  [facility];  Service: Gastroenterology    RI LAP, SURG PROCTOPEXY N/A 2018    Procedure: ROBOTIC SIGMOID RESECTION / RECTOPEXY;  Surgeon: Corinne Gregorio MD;  [facility];  Service: Colorectal    RI SIGMOIDOSCOPY FLX DX W/COLLJ SPEC BR/WA IF PFRMD N/A 2018    Procedure: Trent Fischer;  Surgeon: Corinne Gregorio MD;  [facility];  Service: Colorectal    [facility]ATION [facility]    [facility]  2019     Family History:  Family History   Problem Relation Age of Onset    Bipolar disorder Mother     Mental illness Mother         depression    Stroke Mother     Dementia Mother     Heart disease Father     Hypertension Father     Diabetes Father     Other Family         Back disorder    Diabetes Family     Heart disease Family     Hypertension Family     Breast cancer Family     Stroke Family     Thyroid disease Family     Breast cancer Paternal Grandmother     Breast cancer Paternal [de-identified]     Breast cancer Maternal Aunt     Mental illness Sister     Mental illness Sister     Heart disease Sister     No Known Problems Sister     No Known Problems Sister     Other Son         pituitary tumor    Hypertension Son     Obesity Son     No Known Problems Son     Substance Abuse Neg Hx         neg fam hx     Social History:  Social History     Substance and Sexual Activity   Alcohol Use Never    Frequency: Never    Binge frequency: Never     Social History     Substance and Sexual Activity   Drug Use Yes    Types: Marijuana    Comment: ex marijuana user many years ago, occasional cocaine in the past      Social History     Tobacco Use   Smoking Status Never Smoker   Smokeless Tobacco Never Used     Review of Systems   Constitutional: Positive for fatigue and unexpected weight change  HENT: Negative  Eyes: Negative  Respiratory: Negative  Cardiovascular: Negative  Gastrointestinal: Negative  Endocrine: Negative  Genitourinary: Negative  Musculoskeletal: Positive for arthralgias and back pain  Skin: Negative  Allergic/Immunologic: Negative  Neurological: Negative  Hematological: Bruises/bleeds easily  Psychiatric/Behavioral: The patient is nervous/anxious  Objective:  BP Readings from Last 1 Encounters:   10/15/19 140/90      Wt Readings from Last 1 Encounters:   10/15/19 94 8 kg (209 lb)      BMI:   Estimated body mass index is 32 73 kg/m² as calculated from the following:    Height as of this encounter: 5' 7" (1 702 m)  Weight as of this encounter: 94 8 kg (209 lb)  BSA:   Estimated body surface area is 2 06 meters squared as calculated from the following:    Height as of this encounter: 5' 7" (1 702 m)  Weight as of this encounter: 94 8 kg (209 lb)  Physical Exam   Constitutional: She is oriented to person, place, and time   She appears well-developed  HENT:   Head: Normocephalic and atraumatic  Eyes: Conjunctivae and EOM are normal    Neck: Neck supple  Musculoskeletal:        Left knee: She exhibits no effusion  Neurological: She is alert and oriented to person, place, and time  Skin: Skin is warm  Psychiatric: She has a normal mood and affect  Nursing note and vitals reviewed  Right Knee Exam   Right knee exam is normal       Left Knee Exam     Muscle Strength   The patient has normal left knee strength  Tenderness   The patient is experiencing no tenderness  Range of Motion   The patient has normal left knee ROM  Tests   Kateryna:  Medial - negative Lateral - negative  Varus: negative Valgus: negative  Drawer:  Anterior - negative     Posterior - negative    Other   Erythema: absent  Scars: absent  Sensation: normal  Pulse: present  Swelling: none  Effusion: no effusion present    Comments:  Positive patellar grind            I have personally reviewed pertinent films in PACS and my interpretation is X-rays of the left knee reveal very early degenerative changes

## 2019-10-15 NOTE — ASSESSMENT & PLAN NOTE
Findings consistent with left knee patellofemoral syndrome  Findings and treatment options were discussed with the patient  X-rays were reviewed with her  Recommend formal physical therapy for VMO strengthening  She was prescribed a patella stabilizing brace to use with activities as needed  Continue icing and Tylenol as needed for pain  She is to avoid activities such as repetitive kneeling, squatting and stair climbing  Follow-up in 6 weeks for re-evaluation  All questions were answered to patient's satisfaction

## 2019-10-15 NOTE — TELEPHONE ENCOUNTER
Response receive from [facility]; Carmen Clayton is denied for an exception in the cost sharing tier   She is covered under Medicare Part D member ID# [id number] Request Ref# [id number]

## 2019-10-16 ENCOUNTER — APPOINTMENT (OUTPATIENT)
Dept: LAB | Facility: HOSPITAL | Age: 59
End: 2019-10-16
Attending: INTERNAL MEDICINE
Payer: MEDICARE

## 2019-10-16 DIAGNOSIS — N18.4 STAGE 4 CHRONIC KIDNEY DISEASE (HCC): ICD-10-CM

## 2019-10-16 DIAGNOSIS — R60.1 GENERALIZED EDEMA: ICD-10-CM

## 2019-10-16 LAB
ALBUMIN SERPL BCP-MCNC: 3.7 G/DL (ref 3.5–5)
ALP SERPL-CCNC: 265 U/L (ref 46–116)
ALT SERPL W P-5'-P-CCNC: 41 U/L (ref 12–78)
ANION GAP SERPL CALCULATED.3IONS-SCNC: 10 MMOL/L (ref 4–13)
AST SERPL W P-5'-P-CCNC: 26 U/L (ref 5–45)
BASOPHILS # BLD AUTO: 0.1 THOUSANDS/ΜL (ref 0–0.1)
BASOPHILS NFR BLD AUTO: 1 % (ref 0–1)
BILIRUB SERPL-MCNC: 0.3 MG/DL (ref 0.2–1)
BUN SERPL-MCNC: 50 MG/DL (ref 5–25)
CALCIUM SERPL-MCNC: 9.1 MG/DL (ref 8.3–10.1)
CHLORIDE SERPL-SCNC: 108 MMOL/L (ref 100–108)
CO2 SERPL-SCNC: 22 MMOL/L (ref 21–32)
CREAT SERPL-MCNC: 1.69 MG/DL (ref 0.6–1.3)
EOSINOPHIL # BLD AUTO: 0.67 THOUSAND/ΜL (ref 0–0.61)
EOSINOPHIL NFR BLD AUTO: 9 % (ref 0–6)
ERYTHROCYTE [DISTWIDTH] IN BLOOD BY AUTOMATED COUNT: 14.3 % (ref 11.6–15.1)
GFR SERPL CREATININE-BSD FRML MDRD: 33 ML/MIN/1.73SQ M
GLUCOSE P FAST SERPL-MCNC: 107 MG/DL (ref 65–99)
HCT VFR BLD AUTO: 41.3 % (ref 34.8–46.1)
HGB BLD-MCNC: 12.9 G/DL (ref 11.5–15.4)
IMM GRANULOCYTES # BLD AUTO: 0.03 THOUSAND/UL (ref 0–0.2)
IMM GRANULOCYTES NFR BLD AUTO: 0 % (ref 0–2)
LYMPHOCYTES # BLD AUTO: 1.22 THOUSANDS/ΜL (ref 0.6–4.47)
LYMPHOCYTES NFR BLD AUTO: 16 % (ref 14–44)
MAGNESIUM SERPL-MCNC: 2.3 MG/DL (ref 1.6–2.6)
MCH RBC QN AUTO: 30.9 PG (ref 26.8–34.3)
MCHC RBC AUTO-ENTMCNC: 31.2 G/DL (ref 31.4–37.4)
MCV RBC AUTO: 99 FL (ref 82–98)
MONOCYTES # BLD AUTO: 0.68 THOUSAND/ΜL (ref 0.17–1.22)
MONOCYTES NFR BLD AUTO: 9 % (ref 4–12)
NEUTROPHILS # BLD AUTO: 5.06 THOUSANDS/ΜL (ref 1.85–7.62)
NEUTS SEG NFR BLD AUTO: 65 % (ref 43–75)
NRBC BLD AUTO-RTO: 0 /100 WBCS
PHOSPHATE SERPL-MCNC: 4.9 MG/DL (ref 2.7–4.5)
PLATELET # BLD AUTO: 346 THOUSANDS/UL (ref 149–390)
PMV BLD AUTO: 9.6 FL (ref 8.9–12.7)
POTASSIUM SERPL-SCNC: 4.8 MMOL/L (ref 3.5–5.3)
PROT SERPL-MCNC: 7.4 G/DL (ref 6.4–8.2)
PTH-INTACT SERPL-MCNC: 132.6 PG/ML (ref 18.4–80.1)
RBC # BLD AUTO: 4.18 MILLION/UL (ref 3.81–5.12)
SODIUM SERPL-SCNC: 140 MMOL/L (ref 136–145)
URATE SERPL-MCNC: 5.2 MG/DL (ref 2–6.8)
WBC # BLD AUTO: 7.76 THOUSAND/UL (ref 4.31–10.16)

## 2019-10-16 PROCEDURE — 83735 ASSAY OF MAGNESIUM: CPT

## 2019-10-16 PROCEDURE — 80053 COMPREHEN METABOLIC PANEL: CPT

## 2019-10-16 PROCEDURE — 84100 ASSAY OF PHOSPHORUS: CPT

## 2019-10-16 PROCEDURE — 83970 ASSAY OF PARATHORMONE: CPT

## 2019-10-16 PROCEDURE — 84550 ASSAY OF BLOOD/URIC ACID: CPT

## 2019-10-16 PROCEDURE — 85025 COMPLETE CBC W/AUTO DIFF WBC: CPT

## 2019-10-16 PROCEDURE — 36415 COLL VENOUS BLD VENIPUNCTURE: CPT

## 2019-10-16 NOTE — TELEPHONE ENCOUNTER
I left patient another , requesting a callback  Unfortunately in September she did apply for [facility] Patient Assistance program and was DENIED  She has tried many formulary alternatives in the past; Linzess 290 mcg is the only one that seems to be effective, except she cannot afford  Relistor is a Tier 2 with her current prescription plan  I am not sure Relistor would be appropriate? I need to confirm if she is currently taking an opioid as it looks like she recently discontinued  I highly suggest she look into enrolling in a different plan for next year, to make it more affordable for her

## 2019-10-16 NOTE — TELEPHONE ENCOUNTER
I spoke with patient, she is not taking any opioid medication so Relistor would not be appropriate  She advised in [place] her payment will go back down to $40/month  She was given 1 month worth of samples  I advised she could call again to see what we have available  Patient agreeable

## 2019-10-17 ENCOUNTER — TELEPHONE (OUTPATIENT)
Dept: NEPHROLOGY | Facility: CLINIC | Age: 59
End: 2019-10-17

## 2019-10-17 NOTE — TELEPHONE ENCOUNTER
I called and spoke with Stormy Stuart  She is aware of her lab results and appreciates the call   She has no further questions or concerns at the moment 10/17/19

## 2019-10-17 NOTE — TELEPHONE ENCOUNTER
----- Message from Lonzell Closs, DO sent at 10/17/2019 11:05 AM EDT -----  Please let her know that her labs were reviewed her creatinine is stable at 1 7, electrlytes are stable   PTH is elevated but in goal and we can monitor

## 2019-10-24 ENCOUNTER — TELEPHONE (OUTPATIENT)
Dept: NEPHROLOGY | Facility: CLINIC | Age: 59
End: 2019-10-24

## 2019-10-24 NOTE — TELEPHONE ENCOUNTER
Pt called c/o fatigue, itchiness, and an overall bad feeling  She requested to speak with Dr Jaida Encarnacion directly

## 2019-10-25 ENCOUNTER — TELEPHONE (OUTPATIENT)
Dept: NEPHROLOGY | Facility: CLINIC | Age: 59
End: 2019-10-25

## 2019-10-25 NOTE — TELEPHONE ENCOUNTER
Spoke with patient address her concerns  Her renal function has been stable  We can follow up in December  She was fine with that  Thanks

## 2019-10-25 NOTE — TELEPHONE ENCOUNTER
I called Caterina Sharp for some clarification on her symptoms  She stated that she's been really tired lately and feels "down in the dumps"  Caterina Sharp noted that she can't stand for very long due to back pain  She wasn't sure if this had anything to do with the progression of her kidney disease  Caterina Sharp stated that she wouldn't mind a call from you to discuss further  If unable to, she will await a phone call from myself   Caterina Sharp can be reached at [phone]

## 2019-10-25 NOTE — TELEPHONE ENCOUNTER
Pt called and she's feeling very tired but doesn't know if its a result from her CKD  She wanted to talk to someone regarding the possible symptoms that come with the disease

## 2019-10-25 NOTE — TELEPHONE ENCOUNTER
I spoke with her  I recommended eucerin cream and if no improvement we can f/u with blood work  Thanks

## 2019-10-28 ENCOUNTER — APPOINTMENT (EMERGENCY)
Dept: CT IMAGING | Facility: HOSPITAL | Age: 59
End: 2019-10-28
Payer: MEDICARE

## 2019-10-28 ENCOUNTER — HOSPITAL ENCOUNTER (EMERGENCY)
Facility: HOSPITAL | Age: 59
Discharge: HOME | End: 2019-10-28
Attending: EMERGENCY MEDICINE | Admitting: EMERGENCY MEDICINE
Payer: MEDICARE

## 2019-10-28 VITALS
SYSTOLIC BLOOD PRESSURE: 121 MMHG | HEART RATE: 91 BPM | BODY MASS INDEX: 31.39 KG/M2 | OXYGEN SATURATION: 96 % | TEMPERATURE: 97.8 F | DIASTOLIC BLOOD PRESSURE: 72 MMHG | WEIGHT: 200 LBS | HEIGHT: 67 IN | RESPIRATION RATE: 20 BRPM

## 2019-10-28 DIAGNOSIS — E86.0 MILD DEHYDRATION: Primary | ICD-10-CM

## 2019-10-28 DIAGNOSIS — R10.9 RIGHT FLANK PAIN: ICD-10-CM

## 2019-10-28 DIAGNOSIS — K58.9 IRRITABLE BOWEL SYNDROME (IBS): ICD-10-CM

## 2019-10-28 LAB
ALBUMIN SERPL BCP-MCNC: 4.2 G/DL (ref 3.5–5)
ALP SERPL-CCNC: 241 U/L (ref 46–116)
ALT SERPL W P-5'-P-CCNC: 38 U/L (ref 12–78)
ANION GAP SERPL CALCULATED.3IONS-SCNC: 14 MMOL/L (ref 4–13)
APAP SERPL-MCNC: <3 UG/ML (ref 10–20)
APTT PPP: 28 SECONDS (ref 23–37)
AST SERPL W P-5'-P-CCNC: 29 U/L (ref 5–45)
BASOPHILS # BLD AUTO: 0.12 THOUSANDS/ΜL (ref 0–0.1)
BASOPHILS NFR BLD AUTO: 2 % (ref 0–1)
BILIRUB SERPL-MCNC: 0.4 MG/DL (ref 0.2–1)
BUN SERPL-MCNC: 38 MG/DL (ref 5–25)
CALCIUM SERPL-MCNC: 10.2 MG/DL (ref 8.3–10.1)
CHLORIDE SERPL-SCNC: 100 MMOL/L (ref 100–108)
CLARITY, POC: CLEAR
CO2 SERPL-SCNC: 21 MMOL/L (ref 21–32)
COLOR, POC: YELLOW
CREAT SERPL-MCNC: 2.8 MG/DL (ref 0.6–1.3)
EOSINOPHIL # BLD AUTO: 0.87 THOUSAND/ΜL (ref 0–0.61)
EOSINOPHIL NFR BLD AUTO: 13 % (ref 0–6)
ERYTHROCYTE [DISTWIDTH] IN BLOOD BY AUTOMATED COUNT: 13.5 % (ref 11.6–15.1)
EXT BILIRUBIN, UA: NORMAL
EXT BLOOD URINE: NORMAL
EXT GLUCOSE, UA: NORMAL
EXT KETONES: NORMAL
EXT NITRITE, UA: NORMAL
EXT PH, UA: 6
EXT PROTEIN, UA: NORMAL
EXT SPECIFIC GRAVITY, UA: 1
EXT UROBILINOGEN: 0.2
GFR SERPL CREATININE-BSD FRML MDRD: 18 ML/MIN/1.73SQ M
GLUCOSE SERPL-MCNC: 108 MG/DL (ref 65–140)
HCT VFR BLD AUTO: 43.8 % (ref 34.8–46.1)
HGB BLD-MCNC: 14.5 G/DL (ref 11.5–15.4)
IMM GRANULOCYTES # BLD AUTO: 0.02 THOUSAND/UL (ref 0–0.2)
IMM GRANULOCYTES NFR BLD AUTO: 0 % (ref 0–2)
INR PPP: 0.97 (ref 0.84–1.19)
LIPASE SERPL-CCNC: 228 U/L (ref 73–393)
LYMPHOCYTES # BLD AUTO: 1.65 THOUSANDS/ΜL (ref 0.6–4.47)
LYMPHOCYTES NFR BLD AUTO: 24 % (ref 14–44)
MCH RBC QN AUTO: 31.3 PG (ref 26.8–34.3)
MCHC RBC AUTO-ENTMCNC: 33.1 G/DL (ref 31.4–37.4)
MCV RBC AUTO: 95 FL (ref 82–98)
MONOCYTES # BLD AUTO: 0.62 THOUSAND/ΜL (ref 0.17–1.22)
MONOCYTES NFR BLD AUTO: 9 % (ref 4–12)
NEUTROPHILS # BLD AUTO: 3.54 THOUSANDS/ΜL (ref 1.85–7.62)
NEUTS SEG NFR BLD AUTO: 52 % (ref 43–75)
NRBC BLD AUTO-RTO: 0 /100 WBCS
PLATELET # BLD AUTO: 259 THOUSANDS/UL (ref 149–390)
PMV BLD AUTO: 10.1 FL (ref 8.9–12.7)
POTASSIUM SERPL-SCNC: 5.1 MMOL/L (ref 3.5–5.3)
PROT SERPL-MCNC: 8.3 G/DL (ref 6.4–8.2)
PROTHROMBIN TIME: 12.6 SECONDS (ref 11.6–14.5)
RBC # BLD AUTO: 4.63 MILLION/UL (ref 3.81–5.12)
SODIUM SERPL-SCNC: 135 MMOL/L (ref 136–145)
WBC # BLD AUTO: 6.82 THOUSAND/UL (ref 4.31–10.16)
WBC # BLD EST: NORMAL 10*3/UL

## 2019-10-28 PROCEDURE — 80329 ANALGESICS NON-OPIOID 1 OR 2: CPT | Performed by: EMERGENCY MEDICINE

## 2019-10-28 PROCEDURE — 36415 COLL VENOUS BLD VENIPUNCTURE: CPT | Performed by: EMERGENCY MEDICINE

## 2019-10-28 PROCEDURE — 85730 THROMBOPLASTIN TIME PARTIAL: CPT | Performed by: EMERGENCY MEDICINE

## 2019-10-28 PROCEDURE — 85025 COMPLETE CBC W/AUTO DIFF WBC: CPT | Performed by: EMERGENCY MEDICINE

## 2019-10-28 PROCEDURE — 74176 CT ABD & PELVIS W/O CONTRAST: CPT

## 2019-10-28 PROCEDURE — 96374 THER/PROPH/DIAG INJ IV PUSH: CPT

## 2019-10-28 PROCEDURE — 83690 ASSAY OF LIPASE: CPT | Performed by: EMERGENCY MEDICINE

## 2019-10-28 PROCEDURE — 99285 EMERGENCY DEPT VISIT HI MDM: CPT | Performed by: EMERGENCY MEDICINE

## 2019-10-28 PROCEDURE — 96361 HYDRATE IV INFUSION ADD-ON: CPT

## 2019-10-28 PROCEDURE — 80053 COMPREHEN METABOLIC PANEL: CPT | Performed by: EMERGENCY MEDICINE

## 2019-10-28 PROCEDURE — 81002 URINALYSIS NONAUTO W/O SCOPE: CPT | Performed by: EMERGENCY MEDICINE

## 2019-10-28 PROCEDURE — 99284 EMERGENCY DEPT VISIT MOD MDM: CPT

## 2019-10-28 PROCEDURE — 85610 PROTHROMBIN TIME: CPT | Performed by: EMERGENCY MEDICINE

## 2019-10-28 PROCEDURE — 96375 TX/PRO/DX INJ NEW DRUG ADDON: CPT

## 2019-10-28 RX ORDER — DICYCLOMINE HCL 20 MG
20 TABLET ORAL ONCE
Status: COMPLETED | OUTPATIENT
Start: 2019-10-28 | End: 2019-10-28

## 2019-10-28 RX ORDER — ONDANSETRON 4 MG/1
4 TABLET, ORALLY DISINTEGRATING ORAL EVERY 8 HOURS PRN
Qty: 12 TABLET | Refills: 0 | Status: SHIPPED | OUTPATIENT
Start: 2019-10-28 | End: 2019-11-05

## 2019-10-28 RX ORDER — ONDANSETRON 2 MG/ML
4 INJECTION INTRAMUSCULAR; INTRAVENOUS ONCE
Status: COMPLETED | OUTPATIENT
Start: 2019-10-28 | End: 2019-10-28

## 2019-10-28 RX ORDER — FENTANYL CITRATE 50 UG/ML
50 INJECTION, SOLUTION INTRAMUSCULAR; INTRAVENOUS ONCE
Status: COMPLETED | OUTPATIENT
Start: 2019-10-28 | End: 2019-10-28

## 2019-10-28 RX ORDER — DICYCLOMINE HCL 20 MG
20 TABLET ORAL EVERY 6 HOURS PRN
Qty: 20 TABLET | Refills: 0 | Status: SHIPPED | OUTPATIENT
Start: 2019-10-28 | End: 2019-11-13

## 2019-10-28 RX ADMIN — SODIUM CHLORIDE 1000 ML: 0.9 INJECTION, SOLUTION INTRAVENOUS at 11:52

## 2019-10-28 RX ADMIN — DICYCLOMINE HYDROCHLORIDE 20 MG: 20 TABLET ORAL at 11:54

## 2019-10-28 RX ADMIN — FENTANYL CITRATE 50 MCG: 50 INJECTION, SOLUTION INTRAMUSCULAR; INTRAVENOUS at 09:40

## 2019-10-28 RX ADMIN — ONDANSETRON 4 MG: 2 INJECTION INTRAMUSCULAR; INTRAVENOUS at 09:40

## 2019-10-28 NOTE — ED PROVIDER NOTES
History  Chief Complaint   Patient presents with    Flank Pain     pt complains of lower right-sided back pain radiating to her right flank x4 days  Diarrhea last week, but feels constipated now  Here with right sided back and flank pain for the last couple of days  Pain relatively constant, some wax/wane component, achy pain, worse w/ certain position  Notes anorexia, nausea, vomiting  Had loose stools last week and no bm since Friday  Notes some mild gas and bloating  Denies changes in urination  Denies f/c/s, no cp/pressure, no sob/zapien, no dysuria, frequency or urgency  Hx of partial colectomy in the past 2/2 prolapsed rectum  Hasn't had any problems since the surgery  History provided by:  Patient   used: No    Flank Pain   Pain location:  R flank  Pain quality: aching    Pain radiates to:  Does not radiate  Pain severity:  Moderate  Timing:  Constant  Progression:  Waxing and waning  Chronicity:  New  Context: previous surgery    Context: not awakening from sleep, not diet changes, not sick contacts and not suspicious food intake    Relieved by:  None tried  Worsened by:  Nothing  Ineffective treatments:  None tried  Associated symptoms: anorexia, flatus and nausea    Associated symptoms: no belching, no chest pain, no constipation, no diarrhea, no dysuria, no fatigue, no fever, no hematochezia, no melena, no shortness of breath and no vomiting    Risk factors: no alcohol abuse and not pregnant        Prior to Admission Medications   Prescriptions Last Dose Informant Patient Reported? Taking?    AMILoride 5 mg tablet  Self Yes No   Si bid   AMILoride 5 mg tablet   No No   Sig: TAKE 1 TABLET BY MOUTH TWICE DAILY   Polyethylene Glycol 3350 (MIRALAX PO)  Self Yes No   Sig: Take by mouth as needed    QUEtiapine (SEROQUEL) 300 mg tablet  Self No No   Sig: Take 1 tablet (300 mg total) by mouth every morning 1 in the AM     ( also takes 400 mg at bedtime)   QUEtiapine (SEROquel) 400 MG tablet  Self Yes No   Si at bedtime   acetaminophen (TYLENOL) 325 mg tablet  Self Yes No   Sig: Take 650 mg by mouth every 6 (six) hours as needed for mild pain   albuterol (PROAIR HFA) 90 mcg/act inhaler  Self No No   Sig: Inhale 2 puffs every 6 (six) hours as needed for wheezing   amLODIPine (NORVASC) 5 mg tablet  Self No No   Sig: Take 1 tablet (5 mg total) by mouth 2 (two) times a day   aspirin (ECOTRIN LOW STRENGTH) 81 mg EC tablet  Self No No   Sig: Take 1 tablet (81 mg total) by mouth daily   atorvastatin (LIPITOR) 40 mg tablet   No No   Sig: Take 1 tablet (40 mg total) by mouth daily   budesonide-formoterol (SYMBICORT) 160-4 5 mcg/act inhaler  Self No No   Sig: Inhale 2 puffs 2 (two) times a day Rinse mouth after use     calcium acetate (PHOSLO) 667 mg capsule   No No   Sig: Take 1 capsule (667 mg total) by mouth 3 (three) times a day with meals   carvedilol (COREG) 3 125 mg tablet  Self No No   Sig: Take 1 tablet (3 125 mg total) by mouth 2 (two) times a day with meals   clonazePAM (KlonoPIN) 0 5 mg tablet  Self No No   Sig: TAKE 1 TABLET BY MOUTH 3  TIMES A DAY   fluvoxaMINE (LUVOX) 100 mg tablet  Self Yes No   Sig: Take 100 mg by mouth 3 (three) times a day     lamoTRIgine (LaMICtal) 100 mg tablet  Self Yes No   Sig: Take 100 mg by mouth 3 (three) times a day Pt takes 3 times daily   linaCLOtide (LINZESS) 290 MCG CAPS  Self No No   Sig: Take 1 capsule by mouth daily for 90 days   montelukast (SINGULAIR) 10 mg tablet  Self No No   Sig: TAKE 1 TABLET BY MOUTH AT BEDTIME   omeprazole (PriLOSEC) 40 MG capsule  Self No No   Sig: TAKE 1 CAPSULE BY MOUTH  DAILY AT BEDTIME   oxyCODONE-acetaminophen (PERCOCET) 5-325 mg per tablet Not Taking at Unknown time  No No   Sig: Take 1 tablet by mouth every 4 (four) hours as needed for moderate painMax Daily Amount: 6 tablets   Patient not taking: Reported on 10/15/2019   psyllium (METAMUCIL) 58 6 % powder  Self Yes No   Sig: Take 1 packet by mouth daily      Facility-Administered Medications: None       Past Medical History:   Diagnosis Date    Ankle sprain     left    Anxiety disorder     Bipolar 2 disorder (HCC)     Chronic back pain     CKD (chronic kidney disease) stage 3, GFR 30-59 ml/min (HCC)     stage 4 (as per pt)    CVA (cerebral vascular accident) ([facility] [address] )     noted on MRI in the past    Depression     GERD (gastroesophageal reflux disease)     Hypercholesteremia     Hyperlipidemia     Hypernatremia     Hypertension     Hypokalemia     Intervertebral disc disorder with radiculopathy of lumbosacral region     resolved: 2015    Kidney disease     Panic attacks     Pericardial effusion     PONV (postoperative nausea and vomiting)     Radiculitis     resolved: 2015    Secondary renal hyperparathyroidism ([facility] [address] )     Vitamin D deficiency        Past Surgical History:   Procedure Laterality Date    BUNIONECTOMY      Left foot     COLON SURGERY      COLONOSCOPY  2018    DILATION AND CURETTAGE OF UTERUS      INDUCED       surgically induced    AK CORRJ HALLUX VALGUS W/SESMDC W/DIST METAR OSTEOT Left 2019    Procedure: Kai Torres;  Surgeon: Eloy Garg DPM;  [facility];  Service: Podiatry    AK ERCP DX COLLECTION SPECIMEN BRUSHING/WASHING N/A 2018    Procedure: ENDOSCOPIC RETROGRADE CHOLANGIOPANCREATOGRAPHY (ERCP);   Surgeon: Ursula Gordillo MD;  [facility];  Service: Gastroenterology    AK LAP, SURG PROCTOPEXY N/A 2018    Procedure: ROBOTIC SIGMOID RESECTION / RECTOPEXY;  Surgeon: Haja Wren MD;  [facility];  Service: Colorectal    AK SIGMOIDOSCOPY FLX DX W/COLLJ SPEC BR/WA IF PFRMD N/A 2018    Procedure: Adam Lacy;  Surgeon: Haja Wren MD;  [facility];  Service: Colorectal    TUBAL LIGATION Bilateral [facility] THYROID BIOPSY  2019       Family History   Problem Relation Age of Onset    Bipolar disorder Mother     Mental illness Mother         depression    Stroke Mother     Dementia Mother     Heart disease Father     Hypertension Father     Diabetes Father     Other Family         Back disorder    Diabetes Family     Heart disease Family     Hypertension Family     Breast cancer Family     Stroke Family     Thyroid disease Family     Breast cancer Paternal Grandmother     Breast cancer Paternal Donnamaria Troup     Breast cancer Maternal Aunt     Mental illness Sister     Mental illness Sister     Heart disease Sister     No Known Problems Sister     No Known Problems Sister     Other Son         pituitary tumor    Hypertension Son     Obesity Son     No Known Problems Son     Substance Abuse Neg Hx         neg fam hx     I have reviewed and agree with the history as documented  Social History     Tobacco Use    Smoking status: Never Smoker    Smokeless tobacco: Never Used   Substance Use Topics    Alcohol use: Never     Frequency: Never     Binge frequency: Never    Drug use: Yes     Types: Marijuana     Comment: ex marijuana user many years ago, occasional cocaine in the past         Review of Systems   Constitutional: Negative for fatigue and fever  Respiratory: Negative for shortness of breath  Cardiovascular: Negative for chest pain  Gastrointestinal: Positive for anorexia, flatus and nausea  Negative for constipation, diarrhea, hematochezia, melena and vomiting  Genitourinary: Positive for flank pain  Negative for dysuria  All other systems reviewed and are negative  Physical Exam  Physical Exam   Constitutional: She appears well-developed and well-nourished  HENT:   Nose: Nose normal    Mouth/Throat: Oropharynx is clear and moist    Eyes: Conjunctivae are normal    Neck: Neck supple  Cardiovascular: Normal rate and regular rhythm  Pulmonary/Chest: Effort normal and breath sounds normal    Abdominal: Soft  Normal appearance   There is tenderness in the right upper quadrant and epigastric area  There is no rigidity, no rebound, no guarding and no CVA tenderness  Musculoskeletal: She exhibits no deformity  Neurological: She is alert  Skin: Skin is warm  Psychiatric: She has a normal mood and affect  Nursing note and vitals reviewed        Vital Signs  ED Triage Vitals   Temperature Pulse Respirations Blood Pressure SpO2   10/28/19 0844 10/28/19 0849 10/28/19 0849 10/28/19 0849 10/28/19 0849   97 8 °F (36 6 °C) 94 18 158/84 97 %      Temp src Heart Rate Source Patient Position - Orthostatic VS BP Location FiO2 (%)   -- -- 10/28/19 0849 10/28/19 0849 --     Sitting Right arm       Pain Score       10/28/19 0844       7           Vitals:    10/28/19 1045 10/28/19 1100 10/28/19 1115 10/28/19 1130   BP: 131/78 130/82 128/79 121/72   Pulse: 91 92 91 91   Patient Position - Orthostatic VS:             Visual Acuity      ED Medications  Medications   ondansetron (ZOFRAN) injection 4 mg (4 mg Intravenous Given 10/28/19 0940)   fentanyl citrate (PF) 100 MCG/2ML 50 mcg (50 mcg Intravenous Given 10/28/19 0940)   sodium chloride 0 9 % bolus 1,000 mL (0 mL Intravenous Stopped 10/28/19 1331)   dicyclomine (BENTYL) tablet 20 mg (20 mg Oral Given 10/28/19 1154)       Diagnostic Studies  Results Reviewed     Procedure Component Value Units Date/Time    POCT urinalysis dipstick [[id number]]  (Normal) Resulted:  10/28/19 1145    Lab Status:  Final result Specimen:  Urine Updated:  10/28/19 1148     Color, UA YELLOW     Clarity, UA CLEAR     Glucose, UA (Ref: Negative) NEG     Bilirubin, UA (Ref: Negative) NEG     Ketones, UA (Ref: Negative) NEG     Spec Grav, UA (Ref:1 003-1 030) 1 005     Blood, UA (Ref: Negative) NEG     pH, UA (Ref: 4 5-8 0) 6 0     Protein, UA (Ref: Negative) NEG     Urobilinogen, UA (Ref: 0 2- 1 0) 0 2      Leukocytes, UA (Ref: Negative) NEG     Nitrite, UA (Ref: Negative) NEG    Acetaminophen level-"If concentration is detectable, please discuss with medical  on call " [[id number]]  (Abnormal) Collected:  10/28/19 0854    Lab Status:  Final result Specimen:  Blood from Arm, Right Updated:  10/28/19 0930     Acetaminophen Level <3 ug/mL     Comprehensive metabolic panel [[id number]]  (Abnormal) Collected:  10/28/19 0854    Lab Status:  Final result Specimen:  Blood from Arm, Right Updated:  10/28/19 0928     Sodium 135 mmol/L      Potassium 5 1 mmol/L      Chloride 100 mmol/L      CO2 21 mmol/L      ANION GAP 14 mmol/L      BUN 38 mg/dL      Creatinine 2 80 mg/dL      Glucose 108 mg/dL      Calcium 10 2 mg/dL      AST 29 U/L      ALT 38 U/L      Alkaline Phosphatase 241 U/L      Total Protein 8 3 g/dL      Albumin 4 2 g/dL      Total Bilirubin 0 40 mg/dL      eGFR 18 ml/min/1 73sq m     Narrative:       [place] guidelines for Chronic Kidney Disease (CKD):     Stage 1 with normal or high GFR (GFR > 90 mL/min/1 73 square meters)    Stage 2 Mild CKD (GFR = 60-89 mL/min/1 73 square meters)    Stage 3A Moderate CKD (GFR = 45-59 mL/min/1 73 square meters)    Stage 3B Moderate CKD (GFR = 30-44 mL/min/1 73 square meters)    Stage 4 Severe CKD (GFR = 15-29 mL/min/1 73 square meters)    Stage 5 End Stage CKD (GFR <15 mL/min/1 73 square meters)  Note: GFR calculation is accurate only with a steady state creatinine    Lipase [[id number]]  (Normal) Collected:  10/28/19 0854    Lab Status:  Final result Specimen:  Blood from Arm, Right Updated:  10/28/19 0928     Lipase 228 u/L     Protime-INR [[id number]]  (Normal) Collected:  10/28/19 0901    Lab Status:  Final result Specimen:  Blood from Arm, Right Updated:  10/28/19 0921     Protime 12 6 seconds      INR 0 97    APTT [[id number]]  (Normal) Collected:  10/28/19 0901    Lab Status:  Final result Specimen:  Blood from Arm, Right Updated:  10/28/19 0921     PTT 28 seconds     CBC and differential [[id number]]  (Abnormal) Collected:  10/28/19 0854    Lab Status:  Final result Specimen: Blood from Arm, Right Updated:  10/28/19 0907     WBC 6 82 Thousand/uL      RBC 4 63 Million/uL      Hemoglobin 14 5 g/dL      Hematocrit 43 8 %      MCV 95 fL      MCH 31 3 pg      MCHC 33 1 g/dL      RDW 13 5 %      MPV 10 1 fL      Platelets 122 Thousands/uL      nRBC 0 /100 WBCs      Neutrophils Relative 52 %      Immat GRANS % 0 %      Lymphocytes Relative 24 %      Monocytes Relative 9 %      Eosinophils Relative 13 %      Basophils Relative 2 %      Neutrophils Absolute 3 54 Thousands/µL      Immature Grans Absolute 0 02 Thousand/uL      Lymphocytes Absolute 1 65 Thousands/µL      Monocytes Absolute 0 62 Thousand/µL      Eosinophils Absolute 0 87 Thousand/µL      Basophils Absolute 0 12 Thousands/µL                  CT abdomen pelvis wo contrast   ED Interpretation by Gilford Comings, DO (10/28 1132)   See below      Final Result by Marie Canales MD (10/28 1128)      1  Continued dilatation of the common bile duct and pancreatic duct without obstructing lesion identified, of uncertain etiology in light of normal ERCP  2   No acute inflammatory process in the abdomen or pelvis  3   Mild fecal stasis  Workstation performed: [id number]                    Procedures  Procedures       ED Course  ED Course as of Oct 29 0937   Mon Oct 28, 2019   0935 1 69 - 2 45 in the past w/ most recent being 1 69  Will continue w/ hydration, change scan to non contrast    Creatinine(!): 2 80   1147 Awaiting urine  Mild CAM - will give IVF      1316 Fluids completed    Will have her repeat her BMP as an outpatient in a couple of days and f/u w/ pcm                                  MDM  Number of Diagnoses or Management Options  Irritable bowel syndrome (IBS): new and requires workup  Mild dehydration: new and requires workup  Right flank pain: new and requires workup     Amount and/or Complexity of Data Reviewed  Clinical lab tests: reviewed and ordered  Tests in the radiology section of CPT®: reviewed and ordered  Decide to obtain previous medical records or to obtain history from someone other than the patient: yes  Independent visualization of images, tracings, or specimens: yes        Disposition  Final diagnoses:   Mild dehydration   Irritable bowel syndrome (IBS)   Right flank pain     Time reflects when diagnosis was documented in both MDM as applicable and the Disposition within this note     Time User Action Codes Description Comment    10/28/2019  1:18 PM Rizwana Colón Add [E86 0] Mild dehydration     10/28/2019  1:18 PM Rizwana Colón Add [K58 9] Irritable bowel syndrome (IBS)     10/28/2019  1:18 PM Rizwana Colón L Add [R10 9] Right flank pain       ED Disposition     ED Disposition Condition Date/Time Comment    Discharge Stable Mon Oct 28, 2019  1:17 PM Jazzy discharge to home/self care              Follow-up Information     Follow up With Specialties Details Why Colleen Pace Roseyolandamary jo [address], DO Internal Medicine In 3 days [facility] wor[facility]  [address] [address] [phone]            Discharge Medication List as of 10/28/2019  1:20 PM      START taking these medications    Details   dicyclomine (BENTYL) 20 mg tablet Take 1 tablet (20 mg total) by mouth every 6 (six) hours as needed (diarrhea/crampy abdominal pain), Starting Mon 10/28/2019, Normal      ondansetron (ZOFRAN-ODT) 4 mg disintegrating tablet Take 1 tablet (4 mg total) by mouth every 8 (eight) hours as needed for nausea or vomiting, Starting Mon 10/28/2019, Normal         CONTINUE these medications which have NOT CHANGED    Details   acetaminophen (TYLENOL) 325 mg tablet Take 650 mg by mouth every 6 (six) hours as needed for mild pain, Historical Med      albuterol (PROAIR HFA) 90 mcg/act inhaler Inhale 2 puffs every 6 (six) hours as needed for wheezing, Starting Tue 8/27/2019, Normal      !! AMILoride 5 mg tablet 1 bid, Historical Med      !! AMILoride 5 mg tablet TAKE 1 TABLET BY MOUTH TWICE DAILY, Normal      amLODIPine (NORVASC) 5 mg tablet Take 1 tablet (5 mg total) by mouth 2 (two) times a day, Starting Wed 2/13/2019, Normal      aspirin (ECOTRIN LOW STRENGTH) 81 mg EC tablet Take 1 tablet (81 mg total) by mouth daily, Starting Wed 8/14/2019, No Print      atorvastatin (LIPITOR) 40 mg tablet Take 1 tablet (40 mg total) by mouth daily, Starting Wed 10/2/2019, Normal      budesonide-formoterol (SYMBICORT) 160-4 5 mcg/act inhaler Inhale 2 puffs 2 (two) times a day Rinse mouth after use , Starting Tue 8/27/2019, Print      calcium acetate (PHOSLO) 667 mg capsule Take 1 capsule (667 mg total) by mouth 3 (three) times a day with meals, Starting Fri 10/11/2019, Normal      carvedilol (COREG) 3 125 mg tablet Take 1 tablet (3 125 mg total) by mouth 2 (two) times a day with meals, Starting Mon 8/5/2019, Normal      clonazePAM (KlonoPIN) 0 5 mg tablet TAKE 1 TABLET BY MOUTH 3  TIMES A DAY, Normal      fluvoxaMINE (LUVOX) 100 mg tablet Take 100 mg by mouth 3 (three) times a day  , Starting Wed 10/10/2018, Historical Med      lamoTRIgine (LaMICtal) 100 mg tablet Take 100 mg by mouth 3 (three) times a day Pt takes 3 times daily, Historical Med      linaCLOtide (LINZESS) 290 MCG CAPS Take 1 capsule by mouth daily for 90 days, Starting Tue 9/3/2019, Until Mon 12/2/2019, Print      montelukast (SINGULAIR) 10 mg tablet TAKE 1 TABLET BY MOUTH AT BEDTIME, Normal      omeprazole (PriLOSEC) 40 MG capsule TAKE 1 CAPSULE BY MOUTH  DAILY AT BEDTIME, Normal      oxyCODONE-acetaminophen (PERCOCET) 5-325 mg per tablet Take 1 tablet by mouth every 4 (four) hours as needed for moderate painMax Daily Amount: 6 tablets, Starting Wed 9/25/2019, Print      Polyethylene Glycol 3350 (MIRALAX PO) Take by mouth as needed , Historical Med      psyllium (METAMUCIL) 58 6 % powder Take 1 packet by mouth daily, Historical Med      !! QUEtiapine (SEROQUEL) 300 mg tablet Take 1 tablet (300 mg total) by mouth every morning 1 in the AM     ( also takes 400 mg at bedtime), Starting Wed 1/2/2019, Normal      !! QUEtiapine (SEROquel) 400 MG tablet 1 at bedtime, Historical Med       !! - Potential duplicate medications found  Please discuss with provider  Outpatient Discharge Orders   Basic metabolic panel   Standing Status: Future Standing Exp   Date: 10/28/20       ED Provider  Electronically Signed by           Lesly Kurtz DO  10/29/19 2762

## 2019-10-29 ENCOUNTER — TELEPHONE (OUTPATIENT)
Dept: NEPHROLOGY | Facility: CLINIC | Age: 59
End: 2019-10-29

## 2019-10-29 NOTE — TELEPHONE ENCOUNTER
Patient stated she was recently seen in the ER for pain in her side and lower back  When she had her labs done, she was told her creatinine had gone up to 2 8  She would like you to review her most recent labs and is requesting a call back at your earliest convenience  Thank you!

## 2019-10-29 NOTE — TELEPHONE ENCOUNTER
Discussed patients symptoms with her  Answered questions regarding her CKD    She has repeat Blood work scheduled for tomorrow

## 2019-10-30 ENCOUNTER — OFFICE VISIT (OUTPATIENT)
Dept: FAMILY MEDICINE CLINIC | Facility: HOSPITAL | Age: 59
End: 2019-10-30
Payer: MEDICARE

## 2019-10-30 ENCOUNTER — APPOINTMENT (OUTPATIENT)
Dept: LAB | Facility: HOSPITAL | Age: 59
End: 2019-10-30
Attending: EMERGENCY MEDICINE
Payer: MEDICARE

## 2019-10-30 VITALS
SYSTOLIC BLOOD PRESSURE: 132 MMHG | HEART RATE: 89 BPM | WEIGHT: 202 LBS | OXYGEN SATURATION: 98 % | BODY MASS INDEX: 31.71 KG/M2 | HEIGHT: 67 IN | DIASTOLIC BLOOD PRESSURE: 90 MMHG

## 2019-10-30 DIAGNOSIS — N18.4 STAGE 4 CHRONIC KIDNEY DISEASE (HCC): Primary | ICD-10-CM

## 2019-10-30 DIAGNOSIS — E86.0 MILD DEHYDRATION: ICD-10-CM

## 2019-10-30 DIAGNOSIS — R06.02 SOB (SHORTNESS OF BREATH): ICD-10-CM

## 2019-10-30 DIAGNOSIS — M46.1 BILATERAL SACROILIITIS (HCC): ICD-10-CM

## 2019-10-30 LAB
ANION GAP SERPL CALCULATED.3IONS-SCNC: 10 MMOL/L (ref 4–13)
BUN SERPL-MCNC: 43 MG/DL (ref 5–25)
CALCIUM SERPL-MCNC: 9.6 MG/DL (ref 8.3–10.1)
CHLORIDE SERPL-SCNC: 99 MMOL/L (ref 100–108)
CO2 SERPL-SCNC: 19 MMOL/L (ref 21–32)
CREAT SERPL-MCNC: 2.68 MG/DL (ref 0.6–1.3)
GFR SERPL CREATININE-BSD FRML MDRD: 19 ML/MIN/1.73SQ M
GLUCOSE SERPL-MCNC: 105 MG/DL (ref 65–140)
POTASSIUM SERPL-SCNC: 5.1 MMOL/L (ref 3.5–5.3)
SODIUM SERPL-SCNC: 128 MMOL/L (ref 136–145)

## 2019-10-30 PROCEDURE — 80048 BASIC METABOLIC PNL TOTAL CA: CPT

## 2019-10-30 PROCEDURE — 36415 COLL VENOUS BLD VENIPUNCTURE: CPT

## 2019-10-30 PROCEDURE — 99214 OFFICE O/P EST MOD 30 MIN: CPT | Performed by: INTERNAL MEDICINE

## 2019-10-30 RX ORDER — TRAMADOL HYDROCHLORIDE 50 MG/1
50 TABLET ORAL 2 TIMES DAILY
Qty: 20 TABLET | Refills: 0 | Status: SHIPPED | OUTPATIENT
Start: 2019-10-30 | End: 2020-03-18 | Stop reason: SDUPTHER

## 2019-10-30 NOTE — PROGRESS NOTES
Assessment/Plan:             Problem List Items Addressed This Visit        Genitourinary    Stage 4 chronic kidney disease ([facility] [address] ) - Primary    Relevant Orders    XR chest pa & lateral (Completed)      Other Visit Diagnoses     Bilateral sacroiliitis (HCC)        Relevant Medications    traMADol (ULTRAM) 50 mg tablet    Other Relevant Orders    XR sacrum and coccyx    Ambulatory referral to Orthopedic Surgery    Ambulatory referral to Physical Therapy    SOB (shortness of breath)                Subjective:      Patient ID: Gege Hodgson is a 64 y o  female    1  Right flank/ back pain- using tylenol- was in ER with more severe pain at that time  Had diarrhea for 4 days from 10/24- 26 then restarted again when in ER- had Ct scan of abdomen and pelvis  Seen by GI in June and to have followup in 1 year- ERCP was normal in past for dilated duct  No dietary changes or exposure to others who were ill  No recent change in medications  2  robert- crt was 2 8 ( GFR 18)in Er on Monday- prior crt 1 6- sees Dr Long Wetzel as outpatient- he spoke with her yesterday-given 1 l iv fluids in ER  She reports she  has appt to have fistula surgery- Dr Walter Bumpers in 2 weeks and is having more prurutis- now repeat done today is slightly lower at 2 68  3  sacroilialc   tenderness- will have xrays done and have her see live if not improving - unable to use nsaids due to ckd- had seen Dr Delia Ro in past and had 3 epidural injections in past    resultsIMPRESSION:     1  Continued dilatation of the common bile duct and pancreatic duct without obstructing lesion identified, of uncertain etiology in light of normal ERCP      2  No acute inflammatory process in the abdomen or pelvis      3   Mild fecal stasis      The following portions of the patient's history were reviewed and updated as appropriate: allergies, current medications and problem list      Review of Systems   Respiratory: Negative for cough and shortness of breath           Up 23 steps- was feeling sob on Monday so went to ER    Gastrointestinal: Positive for constipation  On linzess for about a year   Genitourinary: Negative for difficulty urinating  Musculoskeletal: Positive for back pain  Negative for joint swelling  All other systems reviewed and are negative          Objective:      Current Outpatient Medications:     acetaminophen (TYLENOL) 325 mg tablet, Take 650 mg by mouth every 6 (six) hours as needed for mild pain, Disp: , Rfl:     albuterol (PROAIR HFA) 90 mcg/act inhaler, Inhale 2 puffs every 6 (six) hours as needed for wheezing, Disp: 1 Inhaler, Rfl: 5    AMILoride 5 mg tablet, 1 bid, Disp: , Rfl:     amLODIPine (NORVASC) 5 mg tablet, Take 1 tablet (5 mg total) by mouth 2 (two) times a day, Disp: 180 tablet, Rfl: 3    aspirin (ECOTRIN LOW STRENGTH) 81 mg EC tablet, Take 1 tablet (81 mg total) by mouth daily, Disp: 30 tablet, Rfl: 0    atorvastatin (LIPITOR) 40 mg tablet, Take 1 tablet (40 mg total) by mouth daily, Disp: 30 tablet, Rfl: 5    budesonide-formoterol (SYMBICORT) 160-4 5 mcg/act inhaler, Inhale 2 puffs 2 (two) times a day Rinse mouth after use , Disp: 3 Inhaler, Rfl: 3    calcium acetate (PHOSLO) 667 mg capsule, Take 1 capsule (667 mg total) by mouth 3 (three) times a day with meals, Disp: 90 capsule, Rfl: 3    carvedilol (COREG) 3 125 mg tablet, Take 1 tablet (3 125 mg total) by mouth 2 (two) times a day with meals, Disp: 60 tablet, Rfl: 2    clonazePAM (KlonoPIN) 0 5 mg tablet, TAKE 1 TABLET BY MOUTH 3  TIMES A DAY, Disp: 270 tablet, Rfl: 0    dicyclomine (BENTYL) 20 mg tablet, Take 1 tablet (20 mg total) by mouth every 6 (six) hours as needed (diarrhea/crampy abdominal pain), Disp: 20 tablet, Rfl: 0    fluvoxaMINE (LUVOX) 100 mg tablet, Take 100 mg by mouth 3 (three) times a day  , Disp: , Rfl:     lamoTRIgine (LaMICtal) 100 mg tablet, Take 100 mg by mouth 3 (three) times a day Pt takes 3 times daily, Disp: , Rfl:     linaCLOtide Ángel Rosas) 290 MCG CAPS, Take 1 capsule by mouth daily for 90 days, Disp: 90 capsule, Rfl: 3    montelukast (SINGULAIR) 10 mg tablet, TAKE 1 TABLET BY MOUTH AT BEDTIME, Disp: 30 tablet, Rfl: 11    omeprazole (PriLOSEC) 40 MG capsule, TAKE 1 CAPSULE BY MOUTH  DAILY AT BEDTIME, Disp: 90 capsule, Rfl: 3    ondansetron (ZOFRAN-ODT) 4 mg disintegrating tablet, Take 1 tablet (4 mg total) by mouth every 8 (eight) hours as needed for nausea or vomiting, Disp: 12 tablet, Rfl: 0    Polyethylene Glycol 3350 (MIRALAX PO), Take by mouth as needed , Disp: , Rfl:     psyllium (METAMUCIL) 58 6 % powder, Take 1 packet by mouth daily, Disp: , Rfl:     QUEtiapine (SEROQUEL) 300 mg tablet, Take 1 tablet (300 mg total) by mouth every morning 1 in the AM     ( also takes 400 mg at bedtime), Disp: 90 tablet, Rfl: 3    QUEtiapine (SEROquel) 400 MG tablet, 1 at bedtime, Disp: , Rfl:     oxyCODONE-acetaminophen (PERCOCET) 5-325 mg per tablet, Take 1 tablet by mouth every 4 (four) hours as needed for moderate painMax Daily Amount: 6 tablets (Patient not taking: Reported on 10/15/2019), Disp: 10 tablet, Rfl: 0    traMADol (ULTRAM) 50 mg tablet, Take 1 tablet (50 mg total) by mouth 2 (two) times a day, Disp: 20 tablet, Rfl: 0    Blood pressure 132/90, pulse 89, height 5' 7" (1 702 m), weight 91 6 kg (202 lb), SpO2 98 %, not currently breastfeeding  Physical Exam   Constitutional: She appears well-developed and well-nourished  She appears distressed  Mildly anxious   Eyes: Conjunctivae are normal  Right eye exhibits no discharge  Left eye exhibits no discharge  No scleral icterus  Neck: No thyromegaly present  Cardiovascular: Normal rate and regular rhythm  Exam reveals no friction rub  No murmur heard  Pulmonary/Chest: Effort normal and breath sounds normal  No stridor  No respiratory distress  Decreased breath sounds- no wheezes   Abdominal: Soft  Bowel sounds are normal  She exhibits no distension  There is no guarding  Musculoskeletal: She exhibits tenderness  Bilateral si joint tenderness   Lymphadenopathy:     She has no cervical adenopathy  Skin: No rash noted  No erythema  Psychiatric: She has a normal mood and affect  Judgment and thought content normal    Nursing note and vitals reviewed

## 2019-10-31 ENCOUNTER — HOSPITAL ENCOUNTER (OUTPATIENT)
Dept: RADIOLOGY | Facility: HOSPITAL | Age: 59
Discharge: HOME | End: 2019-10-31
Attending: INTERNAL MEDICINE
Payer: MEDICARE

## 2019-10-31 ENCOUNTER — TELEPHONE (OUTPATIENT)
Dept: NEPHROLOGY | Facility: CLINIC | Age: 59
End: 2019-10-31

## 2019-10-31 DIAGNOSIS — M46.1 BILATERAL SACROILIITIS (HCC): ICD-10-CM

## 2019-10-31 DIAGNOSIS — N18.4 CKD (CHRONIC KIDNEY DISEASE) STAGE 4, GFR 15-29 ML/MIN (HCC): Primary | ICD-10-CM

## 2019-10-31 DIAGNOSIS — N18.4 STAGE 4 CHRONIC KIDNEY DISEASE (HCC): ICD-10-CM

## 2019-10-31 PROCEDURE — 71046 X-RAY EXAM CHEST 2 VIEWS: CPT

## 2019-10-31 PROCEDURE — 72220 X-RAY EXAM SACRUM TAILBONE: CPT

## 2019-10-31 NOTE — TELEPHONE ENCOUNTER
I reviewed her blood work her sodium is low at 128, potassium is okay creatinine slightly improved, I did call her to review and discuss let me know when she calls back

## 2019-10-31 NOTE — PROGRESS NOTES
Called her back again and no answer, creatinine is slightly improved but sodium is lower, if she is feeling unwell at all including any worsening mental health problems have her report to ED for evaluation  Otherwise if she feels ok we can have her hold her amiloride, start a fluid restriction of 1 5 liters and repeat a bmp on Monday  Any questions let me know thanks

## 2019-10-31 NOTE — TELEPHONE ENCOUNTER
Received empty box from [facility] that was supposed to have 90 tablets of Linzess 290 in it through patient assistance for this patient  Called [facility] and they will re ship under PO [phone]  May take 5-7 days  Patient notified

## 2019-10-31 NOTE — TELEPHONE ENCOUNTER
Dr Marisol Blair, looks like her BMP from yesterday is finalized  I'm not sure if you had a chance to review it yet - thanks!

## 2019-10-31 NOTE — TELEPHONE ENCOUNTER
Mickie Chun returned Dr Richy Romero left a message  A TigerText was sent to Dr Salvatore Lopez making him aware  Dr Salvatore Lopez will be reaching out to the patient to discuss results  Per progress note from this afternoon, Dr Salvatore Lopez attempted to call her again - please refer to the progress note if Mickie Chun calls back

## 2019-11-04 ENCOUNTER — TELEPHONE (OUTPATIENT)
Dept: NEPHROLOGY | Facility: CLINIC | Age: 59
End: 2019-11-04

## 2019-11-04 NOTE — TELEPHONE ENCOUNTER
Patient called in expressing concerns because she stated she has been drinking a lot of fluids but only urinated 3 times yesterday  She is requesting a call back  Best contact number is [phone]

## 2019-11-04 NOTE — PROGRESS NOTES
Patient called stating her urine output has decreased, drinking more fluid  Unclear etiology some more edema  I have asked her to go to ED for further evaluations and to make sure electrolytes and renal function remain stable  She understood and had no further questions

## 2019-11-04 NOTE — PROGRESS NOTES
Kraig Ganser returned my call  She is aware of her lab results  She states that she feels okay however c/o nausea and is tired a lot  She stated that she has not taken amiloride since last week  Kraig Ganser verbally understood to start on 1 5 liter fluid restriction and repeat a BMP  She will get the blood work done tomorrow

## 2019-11-05 ENCOUNTER — HOSPITAL ENCOUNTER (EMERGENCY)
Facility: HOSPITAL | Age: 59
Discharge: HOME | End: 2019-11-05
Attending: EMERGENCY MEDICINE
Payer: MEDICARE

## 2019-11-05 VITALS
WEIGHT: 170 LBS | DIASTOLIC BLOOD PRESSURE: 83 MMHG | RESPIRATION RATE: 18 BRPM | SYSTOLIC BLOOD PRESSURE: 183 MMHG | TEMPERATURE: 98.3 F | OXYGEN SATURATION: 98 % | BODY MASS INDEX: 26.68 KG/M2 | HEIGHT: 67 IN | HEART RATE: 115 BPM

## 2019-11-05 DIAGNOSIS — N18.30 CHRONIC KIDNEY DISEASE, STAGE 3 (HCC): ICD-10-CM

## 2019-11-05 DIAGNOSIS — N28.9 RENAL INSUFFICIENCY: Primary | ICD-10-CM

## 2019-11-05 DIAGNOSIS — E23.2 DIABETES INSIPIDUS (HCC): ICD-10-CM

## 2019-11-05 DIAGNOSIS — I10 ESSENTIAL HYPERTENSION: ICD-10-CM

## 2019-11-05 DIAGNOSIS — N28.1 RENAL CYST: ICD-10-CM

## 2019-11-05 DIAGNOSIS — I10 HYPERTENSION: ICD-10-CM

## 2019-11-05 DIAGNOSIS — N25.81 SECONDARY RENAL HYPERPARATHYROIDISM (HCC): ICD-10-CM

## 2019-11-05 LAB
ALBUMIN SERPL BCP-MCNC: 3.9 G/DL (ref 3.5–5)
ALP SERPL-CCNC: 231 U/L (ref 46–116)
ALT SERPL W P-5'-P-CCNC: 40 U/L (ref 12–78)
ANION GAP SERPL CALCULATED.3IONS-SCNC: 12 MMOL/L (ref 4–13)
APTT PPP: 27 SECONDS (ref 23–37)
AST SERPL W P-5'-P-CCNC: 37 U/L (ref 5–45)
BASOPHILS # BLD AUTO: 0.09 THOUSANDS/ΜL (ref 0–0.1)
BASOPHILS NFR BLD AUTO: 1 % (ref 0–1)
BILIRUB SERPL-MCNC: 0.3 MG/DL (ref 0.2–1)
BUN SERPL-MCNC: 40 MG/DL (ref 5–25)
CALCIUM SERPL-MCNC: 10 MG/DL (ref 8.3–10.1)
CHLORIDE SERPL-SCNC: 104 MMOL/L (ref 100–108)
CO2 SERPL-SCNC: 21 MMOL/L (ref 21–32)
CREAT SERPL-MCNC: 2.46 MG/DL (ref 0.6–1.3)
EOSINOPHIL # BLD AUTO: 0.74 THOUSAND/ΜL (ref 0–0.61)
EOSINOPHIL NFR BLD AUTO: 10 % (ref 0–6)
ERYTHROCYTE [DISTWIDTH] IN BLOOD BY AUTOMATED COUNT: 13.9 % (ref 11.6–15.1)
GFR SERPL CREATININE-BSD FRML MDRD: 21 ML/MIN/1.73SQ M
GLUCOSE SERPL-MCNC: 127 MG/DL (ref 65–140)
HCT VFR BLD AUTO: 41.4 % (ref 34.8–46.1)
HGB BLD-MCNC: 13.3 G/DL (ref 11.5–15.4)
IMM GRANULOCYTES # BLD AUTO: 0.03 THOUSAND/UL (ref 0–0.2)
IMM GRANULOCYTES NFR BLD AUTO: 0 % (ref 0–2)
INR PPP: 0.93 (ref 0.84–1.19)
LYMPHOCYTES # BLD AUTO: 1.33 THOUSANDS/ΜL (ref 0.6–4.47)
LYMPHOCYTES NFR BLD AUTO: 18 % (ref 14–44)
MCH RBC QN AUTO: 31.4 PG (ref 26.8–34.3)
MCHC RBC AUTO-ENTMCNC: 32.1 G/DL (ref 31.4–37.4)
MCV RBC AUTO: 98 FL (ref 82–98)
MONOCYTES # BLD AUTO: 0.56 THOUSAND/ΜL (ref 0.17–1.22)
MONOCYTES NFR BLD AUTO: 8 % (ref 4–12)
NEUTROPHILS # BLD AUTO: 4.73 THOUSANDS/ΜL (ref 1.85–7.62)
NEUTS SEG NFR BLD AUTO: 63 % (ref 43–75)
NRBC BLD AUTO-RTO: 0 /100 WBCS
PLATELET # BLD AUTO: 246 THOUSANDS/UL (ref 149–390)
PMV BLD AUTO: 11.5 FL (ref 8.9–12.7)
POTASSIUM SERPL-SCNC: 4.5 MMOL/L (ref 3.5–5.3)
PROT SERPL-MCNC: 8 G/DL (ref 6.4–8.2)
PROTHROMBIN TIME: 12.2 SECONDS (ref 11.6–14.5)
RBC # BLD AUTO: 4.24 MILLION/UL (ref 3.81–5.12)
SODIUM SERPL-SCNC: 137 MMOL/L (ref 136–145)
WBC # BLD AUTO: 7.48 THOUSAND/UL (ref 4.31–10.16)

## 2019-11-05 PROCEDURE — 99284 EMERGENCY DEPT VISIT MOD MDM: CPT

## 2019-11-05 PROCEDURE — 85025 COMPLETE CBC W/AUTO DIFF WBC: CPT | Performed by: EMERGENCY MEDICINE

## 2019-11-05 PROCEDURE — 99284 EMERGENCY DEPT VISIT MOD MDM: CPT | Performed by: EMERGENCY MEDICINE

## 2019-11-05 PROCEDURE — 96360 HYDRATION IV INFUSION INIT: CPT

## 2019-11-05 PROCEDURE — 85730 THROMBOPLASTIN TIME PARTIAL: CPT | Performed by: EMERGENCY MEDICINE

## 2019-11-05 PROCEDURE — 93005 ELECTROCARDIOGRAM TRACING: CPT

## 2019-11-05 PROCEDURE — 36415 COLL VENOUS BLD VENIPUNCTURE: CPT | Performed by: EMERGENCY MEDICINE

## 2019-11-05 PROCEDURE — 80053 COMPREHEN METABOLIC PANEL: CPT | Performed by: EMERGENCY MEDICINE

## 2019-11-05 PROCEDURE — 85610 PROTHROMBIN TIME: CPT | Performed by: EMERGENCY MEDICINE

## 2019-11-05 RX ORDER — CARVEDILOL 3.12 MG/1
TABLET ORAL
Qty: 60 TABLET | Refills: 2 | Status: SHIPPED | OUTPATIENT
Start: 2019-11-05 | End: 2020-02-12

## 2019-11-05 RX ADMIN — SODIUM CHLORIDE 500 ML: 0.9 INJECTION, SOLUTION INTRAVENOUS at 07:34

## 2019-11-05 NOTE — ED NOTES
Pt presents to ED d/t having abnormal labs  Pt is set to get a fistula later this month so she can begin dialysis d/t her kidney function  Pt states her sodium is also off  Changed into gown  IV access and blood work obtained       Call bell placed within reach      Fercho Corrales RN  11/05/19 3918

## 2019-11-05 NOTE — ED PROVIDER NOTES
History  Chief Complaint   Patient presents with    Abnormal Lab     This is a 72-year-old female who presents for evaluation of her renal function  She received a call from her nephrologist last evening telling her to come to the emergency room to have her blood work recheck she had blood work on the  of last month which showed a BUN of 43 and creatinine of 2 68 potassium of 5 1 and a sodium of 128 she has some mild nausea decreased p o  Intake denies any fevers  Her renal insufficiency is felt secondary to lithium use in the past and hypertension  History provided by:  Patient  Medical Problem   Location:  Creatinine  Quality:  Elevation  Severity:  Moderate  Onset quality:  Gradual  Timing:  Constant  Progression:  Worsening  Chronicity:  New  Context:  Renal insufficiency secondary to hypertension and lithium use  Associated symptoms: fatigue and nausea    Associated symptoms: no abdominal pain and no fever        Prior to Admission Medications   Prescriptions Last Dose Informant Patient Reported? Taking? Polyethylene Glycol 3350 (MIRALAX PO)  Self Yes No   Sig: Take by mouth as needed    QUEtiapine (SEROQUEL) 300 mg tablet  Self No No   Sig: Take 1 tablet (300 mg total) by mouth every morning 1 in the AM     ( also takes 400 mg at bedtime)   QUEtiapine (SEROquel) 400 MG tablet  Self Yes No   Si at bedtime   acetaminophen (TYLENOL) 325 mg tablet  Self Yes No   Sig: Take 650 mg by mouth every 6 (six) hours as needed for mild pain   albuterol (PROAIR HFA) 90 mcg/act inhaler  Self No No   Sig: Inhale 2 puffs every 6 (six) hours as needed for wheezing   aspirin (ECOTRIN LOW STRENGTH) 81 mg EC tablet  Self No No   Sig: Take 1 tablet (81 mg total) by mouth daily   atorvastatin (LIPITOR) 40 mg tablet   No No   Sig: Take 1 tablet (40 mg total) by mouth daily   budesonide-formoterol (SYMBICORT) 160-4 5 mcg/act inhaler  Self No No   Sig: Inhale 2 puffs 2 (two) times a day Rinse mouth after use     calcium acetate (PHOSLO) 667 mg capsule   No No   Sig: Take 1 capsule (667 mg total) by mouth 3 (three) times a day with meals   carvedilol (COREG) 3 125 mg tablet  Self No No   Sig: Take 1 tablet (3 125 mg total) by mouth 2 (two) times a day with meals   clonazePAM (KlonoPIN) 0 5 mg tablet  Self No No   Sig: TAKE 1 TABLET BY MOUTH 3  TIMES A DAY   dicyclomine (BENTYL) 20 mg tablet   No No   Sig: Take 1 tablet (20 mg total) by mouth every 6 (six) hours as needed (diarrhea/crampy abdominal pain)   fluvoxaMINE (LUVOX) 100 mg tablet  Self Yes No   Sig: Take 100 mg by mouth 3 (three) times a day     lamoTRIgine (LaMICtal) 100 mg tablet  Self Yes No   Sig: Take 100 mg by mouth 3 (three) times a day Pt takes 3 times daily   linaCLOtide (LINZESS) 290 MCG CAPS  Self No No   Sig: Take 1 capsule by mouth daily for 90 days   montelukast (SINGULAIR) 10 mg tablet  Self No No   Sig: TAKE 1 TABLET BY MOUTH AT BEDTIME   omeprazole (PriLOSEC) 40 MG capsule  Self No No   Sig: TAKE 1 CAPSULE BY MOUTH  DAILY AT BEDTIME   oxyCODONE-acetaminophen (PERCOCET) 5-325 mg per tablet   No No   Sig: Take 1 tablet by mouth every 4 (four) hours as needed for moderate painMax Daily Amount: 6 tablets   Patient not taking: Reported on 10/15/2019   traMADol (ULTRAM) 50 mg tablet   No No   Sig: Take 1 tablet (50 mg total) by mouth 2 (two) times a day      Facility-Administered Medications: None       Past Medical History:   Diagnosis Date    Ankle sprain     left    Anxiety disorder     Bipolar 2 disorder (HCC)     Chronic back pain     CKD (chronic kidney disease) stage 3, GFR 30-59 ml/min ([facility])     stage 4 (as per pt)    CVA (cerebral vascular accident) ([address] )     noted on MRI in the past    Depression     GERD (gastroesophageal reflux disease)     Hypercholesteremia     Hyperlipidemia     Hypernatremia     Hypertension     Hypokalemia     Intervertebral disc disorder with radiculopathy of lumbosacral region     resolved: 12/28/2015    Kidney disease     Panic attacks     Pericardial effusion     PONV (postoperative nausea and vomiting)     Radiculitis     resolved: 2015    Secondary renal hyperparathyroidism ([facility] [address] )     Vitamin D deficiency        Past Surgical History:   Procedure Laterality Date    BUNIONECTOMY      Left foot     COLON SURGERY      COLONOSCOPY  2018    DILATION AND CURETTAGE OF UTERUS      INDUCED       surgically induced    WA CORRJ HALLUX VALGUS W/SESMDC W/DIST METAR OSTEOT Left 2019    Procedure: Maryan Romero;  Surgeon: Aury Muir DPM;  Location[facility];  Service: Podiatry    WA ERCP DX COLLECTION SPECIMEN BRUSHING/WASHING N/A 2018    Procedure: ENDOSCOPIC RETROGRADE CHOLANGIOPANCREATOGRAPHY (ERCP);   Surgeon: Chepe Hernandez MD;  [facility];  Service: Gastroenterology    WA LAP, SURG PROCTOPEXY N/A 2018    Procedure: ROBOTIC SIGMOID RESECTION / RECTOPEXY;  Surgeon: Shilpa Ortiz MD;  [facility];  Service: Colorectal    WA SIGMOIDOSCOPY FLX DX W/COLLJ SPEC BR/WA IF PFRMD N/A 2018    Procedure: Jg Montesinos;  Surgeon: Shilpa Ortiz MD;  [facility];  Service: Colorectal    TUBAL LIGATION Bilateral [facility] THYROID BIOPSY  2019       Family History   Problem Relation Age of Onset    Bipolar disorder Mother     Mental illness Mother         depression    Stroke Mother     Dementia Mother     Heart disease Father     Hypertension Father     Diabetes Father     Other Family         Back disorder    Diabetes Family     Heart disease Family     Hypertension Family     Breast cancer Family     Stroke Family     Thyroid disease Family     Breast cancer Paternal [de-identified]     Breast cancer Paternal [de-identified]     Breast cancer Maternal Aunt     Mental illness Sister     Mental illness Sister     Heart disease Sister     No Known Problems Sister     No Known Problems Sister    [facility] Other Son pituitary tumor    Hypertension Son     Obesity Son     No Known Problems Son     Substance Abuse Neg Hx         neg fam hx     I have reviewed and agree with the history as documented  Social History     Tobacco Use    Smoking status: Never Smoker    Smokeless tobacco: Never Used   Substance Use Topics    Alcohol use: Never     Frequency: Never     Binge frequency: Never    Drug use: Yes     Types: Marijuana     Comment: ex marijuana user many years ago, occasional cocaine in the past         Review of Systems   Constitutional: Positive for fatigue  Negative for fever  Gastrointestinal: Positive for nausea  Negative for abdominal pain  Musculoskeletal: Positive for back pain  All other systems reviewed and are negative  Physical Exam  Physical Exam   Constitutional: She is oriented to person, place, and time  She appears well-developed  No distress  HENT:   Head: Normocephalic and atraumatic  Right Ear: External ear normal    Left Ear: External ear normal    Nose: Nose normal    Mouth/Throat: Oropharynx is clear and moist    Eyes: Pupils are equal, round, and reactive to light  EOM are normal  Right eye exhibits no discharge  Left eye exhibits no discharge  Neck: Neck supple  No JVD present  No tracheal deviation present  Cardiovascular: Regular rhythm and intact distal pulses  Exam reveals no gallop and no friction rub  No murmur heard  Tachycardic   Pulmonary/Chest: Effort normal and breath sounds normal  No stridor  No respiratory distress  She has no wheezes  She has no rales  Abdominal: Soft  Bowel sounds are normal  She exhibits no distension and no mass  There is no tenderness  There is no guarding  Musculoskeletal: Normal range of motion  She exhibits edema  She exhibits no tenderness (Trace bilateral) or deformity  Neurological: She is alert and oriented to person, place, and time  No cranial nerve deficit or sensory deficit  She exhibits normal muscle tone  Coordination normal    Skin: Skin is warm and dry  No rash noted  She is not diaphoretic  Psychiatric: She has a normal mood and affect  Her behavior is normal  Thought content normal    Nursing note and vitals reviewed        Vital Signs  ED Triage Vitals [11/05/19 0707]   Temperature Pulse Respirations Blood Pressure SpO2   98 3 °F (36 8 °C) (!) 115 18 (!) 183/83 98 %      Temp src Heart Rate Source Patient Position - Orthostatic VS BP Location FiO2 (%)   -- -- -- -- --      Pain Score       7           Vitals:    11/05/19 0707   BP: (!) 183/83   Pulse: (!) 115         Visual Acuity      ED Medications  Medications   sodium chloride 0 9 % bolus 500 mL (500 mL Intravenous New Bag 11/5/19 0734)       Diagnostic Studies  Results Reviewed     Procedure Component Value Units Date/Time    Comprehensive metabolic panel [[id number]]  (Abnormal) Collected:  11/05/19 0732    Lab Status:  Final result Specimen:  Blood from Arm, Right Updated:  11/05/19 0841     Sodium 137 mmol/L      Potassium 4 5 mmol/L      Chloride 104 mmol/L      CO2 21 mmol/L      ANION GAP 12 mmol/L      BUN 40 mg/dL      Creatinine 2 46 mg/dL      Glucose 127 mg/dL      Calcium 10 0 mg/dL      AST 37 U/L      ALT 40 U/L      Alkaline Phosphatase 231 U/L      Total Protein 8 0 g/dL      Albumin 3 9 g/dL      Total Bilirubin 0 30 mg/dL      eGFR 21 ml/min/1 73sq m     Narrative:       [place] guidelines for Chronic Kidney Disease (CKD):     Stage 1 with normal or high GFR (GFR > 90 mL/min/1 73 square meters)    Stage 2 Mild CKD (GFR = 60-89 mL/min/1 73 square meters)    Stage 3A Moderate CKD (GFR = 45-59 mL/min/1 73 square meters)    Stage 3B Moderate CKD (GFR = 30-44 mL/min/1 73 square meters)    Stage 4 Severe CKD (GFR = 15-29 mL/min/1 73 square meters)    Stage 5 End Stage CKD (GFR <15 mL/min/1 73 square meters)  Note: GFR calculation is accurate only with a steady state creatinine    Protime-INR [[id number]]  (Normal) Collected:  11/05/19 0732    Lab Status:  Final result Specimen:  Blood from Arm, Right Updated:  11/05/19 0834     Protime 12 2 seconds      INR 0 93    APTT [[id number]]  (Normal) Collected:  11/05/19 0732    Lab Status:  Final result Specimen:  Blood from Arm, Right Updated:  11/05/19 0834     PTT 27 seconds     CBC and differential [[id number]]  (Abnormal) Collected:  11/05/19 0732    Lab Status:  Final result Specimen:  Blood from Arm, Right Updated:  11/05/19 0814     WBC 7 48 Thousand/uL      RBC 4 24 Million/uL      Hemoglobin 13 3 g/dL      Hematocrit 41 4 %      MCV 98 fL      MCH 31 4 pg      MCHC 32 1 g/dL      RDW 13 9 %      MPV 11 5 fL      Platelets 873 Thousands/uL      nRBC 0 /100 WBCs      Neutrophils Relative 63 %      Immat GRANS % 0 %      Lymphocytes Relative 18 %      Monocytes Relative 8 %      Eosinophils Relative 10 %      Basophils Relative 1 %      Neutrophils Absolute 4 73 Thousands/µL      Immature Grans Absolute 0 03 Thousand/uL      Lymphocytes Absolute 1 33 Thousands/µL      Monocytes Absolute 0 56 Thousand/µL      Eosinophils Absolute 0 74 Thousand/µL      Basophils Absolute 0 09 Thousands/µL     POCT urinalysis dipstick [[id number]]     Lab Status:  No result Specimen:  Urine                  No orders to display              Procedures  ECG 12 Lead Documentation Only  Date/Time: 11/5/2019 7:52 AM  Performed by: Mariangel Ocampo DO  Authorized by: Mariangel Ocampo DO     ECG reviewed by me, the ED Provider: yes    Patient location:  ED  Rate:     ECG rate:  90  Rhythm:     Rhythm: sinus rhythm    Conduction:     Conduction: normal    T waves:     T waves: normal    Other findings:     Other findings: prolonged qTc interval             ED Course  ED Course as of Nov 05 0903   Tue Nov 05, 2019   0858 GI follow are and creatinine actually improved from last reading but patient has not taken her blood pressure medication yet this morning she was instructed to take it as soon as she gets home follow up with her primary care physician within the next 1-2 days for recheck blood pressure and also follow up with Nephrology                                  MDM  Number of Diagnoses or Management Options  Diagnosis management comments: Renal insufficiency from blood work 1 week ago will recheck labs       Amount and/or Complexity of Data Reviewed  Clinical lab tests: ordered        Disposition  Final diagnoses:   Renal insufficiency   Hypertension     Time reflects when diagnosis was documented in both MDM as applicable and the Disposition within this note     Time User Action Codes Description Comment    11/5/2019  9:01 AM Faiza Guess Add [N28 9] Renal insufficiency     11/5/2019  9:01 AM Thanh, [facility] Hypertension       ED Disposition     ED Disposition Condition Date/Time Comment    Discharge Stable Tue Nov 5, 2019  9:01 AM Jazzy discharge to home/self care  Follow-up Information     Follow up With Specialties Details Why Colleen Mabry 104, DO Internal Medicine In 2 days Recheck blood pressure [place]  [address] [id number]  [address] [place] [address] Nephrology In 1 week 9 [address]  [phone]            Patient's Medications   Discharge Prescriptions    No medications on file     No discharge procedures on file      ED Provider  Electronically Signed by           Ciara Nicoel DO  11/05/19 3991

## 2019-11-06 LAB
ATRIAL RATE: 90 BPM
P AXIS: 59 DEGREES
PR INTERVAL: 150 MS
QRS AXIS: 107 DEGREES
QRSD INTERVAL: 112 MS
QT INTERVAL: 404 MS
QTC INTERVAL: 494 MS
T WAVE AXIS: 61 DEGREES
VENTRICULAR RATE: 90 BPM

## 2019-11-06 PROCEDURE — 93010 ELECTROCARDIOGRAM REPORT: CPT | Performed by: INTERNAL MEDICINE

## 2019-11-07 ENCOUNTER — TELEPHONE (OUTPATIENT)
Dept: VASCULAR SURGERY | Facility: CLINIC | Age: 59
End: 2019-11-07

## 2019-11-07 DIAGNOSIS — N18.6 END STAGE RENAL DISEASE (HCC): Primary | ICD-10-CM

## 2019-11-07 NOTE — TELEPHONE ENCOUNTER
Left message for patient to call us back to make an appointment for an updated vein mapping since patient has been in and out of the hospital since her last vein mapping on 9-20-19 LLF

## 2019-11-11 ENCOUNTER — ANESTHESIA EVENT (OUTPATIENT)
Dept: PERIOP | Facility: HOSPITAL | Age: 59
End: 2019-11-11
Payer: MEDICARE

## 2019-11-11 ENCOUNTER — VBI (OUTPATIENT)
Dept: FAMILY MEDICINE CLINIC | Facility: HOSPITAL | Age: 59
End: 2019-11-11

## 2019-11-11 NOTE — TELEPHONE ENCOUNTER
Hamlin Done    ED Visit Information     Ed visit date: 11/11/2019  Diagnosis Description: Renal insufficiency; Hypertension  In Network? Yes [facility]  Discharge status: Home  Discharged with meds ? Yes  Number of ED visits to date: 5 ( 1 ED to admission)   ED Severity:n/a     Outreach Information    Outreach successful: Yes 1  Date letter mailed:n/a  Date Finalized:11/11/2019    Care Coordination    Follow up appointment with pcp: no Declined  Transportation issues ? No    [employer] type:  7 Day Outreach  Emergent necessity warranted by diagnosis:  No  [facility] PCP:  Yes  Transportation:  Self Transport  Called PCP first?:  No  Feels able to call PCP for urgent problems ?:  Yes  Understands what emergencies can be handled by PCP ?:  Yes  Ever any problems getting appointment with PCP for minor emergency/urgency problems?:  No  Practice Contacted Patient ?:  No  Pt had ED follow up with pcp/staff ?:  No    Seen for follow-up out of network ?:  No  Reason Patient went to ED instead of Urgent Care or PCP?:  Perceived Severity of Illness  Urgent care Education?:  No  11/11/2019 02:05 PM Phone ([facility]) Denton Acuna (Self) [phone] (H)   Left Message  Unable to reach patient regarding recent ED visit on 11/5 for Renal insufficiency; Hypertension  Patient was discharged without medication and advised to follow up with nephrology  2nd attempt will be made on 11/12 11/11/2019 02:18 PM Phone ([facility]) Denton Acuna (Self) [phone] (H)   Return Call  Personal communication with patient regarding recent ED visit on 11/5 for Renal insufficiency; Hypertension  Patient was discharged without medication and advised to follow up with nephrology  Patient stated that she is doing okay  She declined to schedule a follow up appt with PCP at this time  She stated that she is having surgery on 11/18 and will need to be clear for surgery   She will drop the form off at Dr Genaro Powers office on 11/12/2019  Patient does not meet OPCM criteria  Patient was not aware of her PCP on-call after hours service, education given  Patient is aware of her nearest [place] [address] urgent care facility and what conditions may be treated there

## 2019-11-12 ENCOUNTER — TELEPHONE (OUTPATIENT)
Dept: VASCULAR SURGERY | Facility: CLINIC | Age: 59
End: 2019-11-12

## 2019-11-12 NOTE — TELEPHONE ENCOUNTER
Spoke to Marybel at patients PCP office Dr Nereyda Josue ph: [phone]  The anesthesiologist for patients upcoming surgery on 11-18-19 is asking for medical clearance  I faxed over the clearance form to [phone] for  Dr Nereyda Josue to sign  Marybel said the patient was in to see the doctor 2 weeks ago and that she may sign it  If not they will try to put her on the schedule   [facility]

## 2019-11-13 ENCOUNTER — APPOINTMENT (OUTPATIENT)
Dept: LAB | Facility: HOSPITAL | Age: 59
End: 2019-11-13
Attending: SURGERY
Payer: MEDICARE

## 2019-11-13 DIAGNOSIS — N18.6 END STAGE RENAL DISEASE (HCC): ICD-10-CM

## 2019-11-13 DIAGNOSIS — N18.4 CKD (CHRONIC KIDNEY DISEASE) STAGE 4, GFR 15-29 ML/MIN (HCC): ICD-10-CM

## 2019-11-13 LAB
ANION GAP SERPL CALCULATED.3IONS-SCNC: 4 MMOL/L (ref 4–13)
BUN SERPL-MCNC: 34 MG/DL (ref 5–25)
CALCIUM SERPL-MCNC: 9.9 MG/DL (ref 8.3–10.1)
CHLORIDE SERPL-SCNC: 108 MMOL/L (ref 100–108)
CO2 SERPL-SCNC: 29 MMOL/L (ref 21–32)
CREAT SERPL-MCNC: 2.18 MG/DL (ref 0.6–1.3)
ERYTHROCYTE [DISTWIDTH] IN BLOOD BY AUTOMATED COUNT: 13.7 % (ref 11.6–15.1)
GFR SERPL CREATININE-BSD FRML MDRD: 24 ML/MIN/1.73SQ M
GLUCOSE P FAST SERPL-MCNC: 96 MG/DL (ref 65–99)
HCT VFR BLD AUTO: 38.9 % (ref 34.8–46.1)
HGB BLD-MCNC: 11.9 G/DL (ref 11.5–15.4)
MCH RBC QN AUTO: 31.6 PG (ref 26.8–34.3)
MCHC RBC AUTO-ENTMCNC: 30.6 G/DL (ref 31.4–37.4)
MCV RBC AUTO: 103 FL (ref 82–98)
PLATELET # BLD AUTO: 275 THOUSANDS/UL (ref 149–390)
PMV BLD AUTO: 10.9 FL (ref 8.9–12.7)
POTASSIUM SERPL-SCNC: 5.1 MMOL/L (ref 3.5–5.3)
RBC # BLD AUTO: 3.77 MILLION/UL (ref 3.81–5.12)
SODIUM SERPL-SCNC: 141 MMOL/L (ref 136–145)
WBC # BLD AUTO: 5.15 THOUSAND/UL (ref 4.31–10.16)

## 2019-11-13 PROCEDURE — 36415 COLL VENOUS BLD VENIPUNCTURE: CPT

## 2019-11-13 PROCEDURE — 80048 BASIC METABOLIC PNL TOTAL CA: CPT

## 2019-11-13 PROCEDURE — 85027 COMPLETE CBC AUTOMATED: CPT

## 2019-11-13 NOTE — PRE-PROCEDURE INSTRUCTIONS
Pre-Surgery Instructions:   Medication Instructions    acetaminophen (TYLENOL) 325 mg tablet Instructed patient per Anesthesia Guidelines   albuterol (PROAIR HFA) 90 mcg/act inhaler Instructed patient per Anesthesia Guidelines   aspirin (ECOTRIN LOW STRENGTH) 81 mg EC tablet Patient was instructed by Physician and understands   atorvastatin (LIPITOR) 40 mg tablet Instructed patient per Anesthesia Guidelines   budesonide-formoterol (SYMBICORT) 160-4 5 mcg/act inhaler Instructed patient per Anesthesia Guidelines   calcium acetate (PHOSLO) 667 mg capsule Instructed patient per Anesthesia Guidelines   carvedilol (COREG) 3 125 mg tablet Instructed patient per Anesthesia Guidelines   clonazePAM (KlonoPIN) 0 5 mg tablet Instructed patient per Anesthesia Guidelines   fluvoxaMINE (LUVOX) 100 mg tablet Instructed patient per Anesthesia Guidelines   lamoTRIgine (LaMICtal) 100 mg tablet Instructed patient per Anesthesia Guidelines   linaCLOtide (LINZESS) 290 MCG CAPS Instructed patient per Anesthesia Guidelines   omeprazole (PriLOSEC) 40 MG capsule Instructed patient per Anesthesia Guidelines   oxyCODONE-acetaminophen (PERCOCET) 5-325 mg per tablet Instructed patient per Anesthesia Guidelines   Polyethylene Glycol 3350 (MIRALAX PO) Instructed patient per Anesthesia Guidelines   QUEtiapine (SEROQUEL) 300 mg tablet Instructed patient per Anesthesia Guidelines   QUEtiapine (SEROquel) 400 MG tablet Instructed patient per Anesthesia Guidelines   traMADol (ULTRAM) 50 mg tablet Instructed patient per Anesthesia Guidelines  Before your operation, you play an important role in decreasing your risk for infection by washing with special antiseptic soap  This is an effective way to reduce bacteria on the skin which may help to prevent infections at the surgical site  Please read the following directions in advance  1   In the week before your operation purchase a 4 ounce bottle of antiseptic soap containing chlorhexidine gluconate 4%  Some brand names include: Aplicare, Endure, and Hibiclens  The cost is usually less than $5 00  · For your convenience, the [employer] carries the soap  · It may also be available at your doctor's office or pre-admission testing center, and at most retail pharmacies  · If you are allergic or sensitive to soaps containing chlorhexidine gluconate (CHG), please let your doctor know so another antiseptic soap can be suggested  · CHG antiseptic soap is for external use only  2      The day before your operation follow these directions carefully to get ready  · Place clean lines (sheets) on your bed; you should sleep on clean sheets after your evening shower  · Get clean towels and washcloths ready - you need enough for 2 showers  · Set aside clean underwear, pajamas, and clothing to wear after the shower  Reminders:  · DO NOT use any other soap or body rinse on your skin during or after the antiseptic showers  · DO NOT use lotion , powder, deodorant, or perfume/aftershave of any kind on your skin after your antiseptic shower  · DO NOT shave any body parts in the 24 hours/the day before your operation  · DO NOT get the antiseptic soap in your eyes, ears, nose, mouth, or vaginal area  3      You will need to shower the night before AND the morning of your Surgery  Shower 1:  · The evening before your operation, take the fist shower  · First, shampoo your hair with regular shampoo and rinse it completely before you use the anitseptic soap  After washing and rinsing your hair, rinse your body  · Next, use a clean wash cloth to apply the antiseptic soap and wash your body from the neck down to your toes using 1/2 bottle of the antiseptic soap  You will use the other 1/2 bottle for the second shower  · Clean the area where your incision will be; later this area well for about 2 minutes    · If you ar having head or neck surgery, wash areas with the antiseptic soap  · Rinse yourself completely with running water  · Use a clean towel to dry off  · Wear clean underwear and clothing/pajamas  Shower 2:  · The Morning of your operation, take the second shower following the same steps as Shower 1 using the second 1/2 of the bottle of antiseptic soap  · Use clean cloths and towels to was and dry yourself off  · Wear clean underwear and clothing

## 2019-11-14 ENCOUNTER — TELEPHONE (OUTPATIENT)
Dept: VASCULAR SURGERY | Facility: CLINIC | Age: 59
End: 2019-11-14

## 2019-11-14 ENCOUNTER — HOSPITAL ENCOUNTER (OUTPATIENT)
Dept: NON INVASIVE DIAGNOSTICS | Facility: HOSPITAL | Age: 59
Discharge: HOME | End: 2019-11-14
Attending: SURGERY
Payer: MEDICARE

## 2019-11-14 DIAGNOSIS — N18.6 END STAGE RENAL DISEASE (HCC): ICD-10-CM

## 2019-11-14 PROCEDURE — G0365 VESSEL MAPPING HEMO ACCESS: HCPCS

## 2019-11-14 NOTE — TELEPHONE ENCOUNTER
Called Dr Morales Kee office and s/w Selin Hunter, she said pt will be cleared but form is still sitting on her desk  Reminded her surgery is 11/18

## 2019-11-14 NOTE — TELEPHONE ENCOUNTER
Pharmacist called re: oxycodone 5/325 rx Dr Dali Glez wrote 9/25, pt is req it be filled now  She also has been given tramadol rx by another provider prev  Explained pt is scheduled for surgery 11/18 and this rx was provided due to upcoming surgery

## 2019-11-15 PROCEDURE — G0365 VESSEL MAPPING HEMO ACCESS: HCPCS | Performed by: SURGERY

## 2019-11-18 ENCOUNTER — ANESTHESIA (OUTPATIENT)
Dept: PERIOP | Facility: HOSPITAL | Age: 59
End: 2019-11-18
Payer: MEDICARE

## 2019-11-18 ENCOUNTER — HOSPITAL ENCOUNTER (OUTPATIENT)
Facility: HOSPITAL | Age: 59
Setting detail: OUTPATIENT SURGERY
Discharge: HOME | End: 2019-11-18
Attending: SURGERY | Admitting: SURGERY
Payer: MEDICARE

## 2019-11-18 VITALS
BODY MASS INDEX: 26.63 KG/M2 | DIASTOLIC BLOOD PRESSURE: 79 MMHG | HEART RATE: 85 BPM | SYSTOLIC BLOOD PRESSURE: 138 MMHG | RESPIRATION RATE: 18 BRPM | HEIGHT: 67 IN | OXYGEN SATURATION: 92 % | TEMPERATURE: 98 F

## 2019-11-18 DIAGNOSIS — N18.4 STAGE 4 CHRONIC KIDNEY DISEASE (HCC): Primary | ICD-10-CM

## 2019-11-18 LAB
EXT PREGNANCY TEST URINE: NEGATIVE
EXT. CONTROL: NORMAL
POTASSIUM SERPL-SCNC: 4.7 MMOL/L (ref 3.5–5.3)

## 2019-11-18 PROCEDURE — 36821 AV FUSION DIRECT ANY SITE: CPT | Performed by: PHYSICIAN ASSISTANT

## 2019-11-18 PROCEDURE — 84132 ASSAY OF SERUM POTASSIUM: CPT | Performed by: SURGERY

## 2019-11-18 PROCEDURE — 36821 AV FUSION DIRECT ANY SITE: CPT | Performed by: SURGERY

## 2019-11-18 PROCEDURE — 81025 URINE PREGNANCY TEST: CPT | Performed by: SURGERY

## 2019-11-18 RX ORDER — HYDROMORPHONE HCL/PF 1 MG/ML
0.5 SYRINGE (ML) INJECTION
Status: DISCONTINUED | OUTPATIENT
Start: 2019-11-18 | End: 2019-11-18 | Stop reason: HOSPADM

## 2019-11-18 RX ORDER — PROPOFOL 10 MG/ML
INJECTION, EMULSION INTRAVENOUS CONTINUOUS PRN
Status: DISCONTINUED | OUTPATIENT
Start: 2019-11-18 | End: 2019-11-18 | Stop reason: SURG

## 2019-11-18 RX ORDER — MIDAZOLAM HYDROCHLORIDE 2 MG/2ML
INJECTION, SOLUTION INTRAMUSCULAR; INTRAVENOUS AS NEEDED
Status: DISCONTINUED | OUTPATIENT
Start: 2019-11-18 | End: 2019-11-18 | Stop reason: SURG

## 2019-11-18 RX ORDER — OXYCODONE HYDROCHLORIDE AND ACETAMINOPHEN 5; 325 MG/1; MG/1
1 TABLET ORAL EVERY 6 HOURS PRN
Qty: 10 TABLET | Refills: 0 | Status: SHIPPED | OUTPATIENT
Start: 2019-11-18 | End: 2019-11-28

## 2019-11-18 RX ORDER — PROPOFOL 10 MG/ML
INJECTION, EMULSION INTRAVENOUS AS NEEDED
Status: DISCONTINUED | OUTPATIENT
Start: 2019-11-18 | End: 2019-11-18 | Stop reason: SURG

## 2019-11-18 RX ORDER — FENTANYL CITRATE/PF 50 MCG/ML
25 SYRINGE (ML) INJECTION
Status: DISCONTINUED | OUTPATIENT
Start: 2019-11-18 | End: 2019-11-18 | Stop reason: HOSPADM

## 2019-11-18 RX ORDER — CHLORHEXIDINE GLUCONATE 0.12 MG/ML
15 RINSE ORAL ONCE
Status: DISCONTINUED | OUTPATIENT
Start: 2019-11-18 | End: 2019-11-18

## 2019-11-18 RX ORDER — LIDOCAINE HYDROCHLORIDE 10 MG/ML
INJECTION, SOLUTION INFILTRATION; PERINEURAL AS NEEDED
Status: DISCONTINUED | OUTPATIENT
Start: 2019-11-18 | End: 2019-11-18 | Stop reason: HOSPADM

## 2019-11-18 RX ORDER — FENTANYL CITRATE 50 UG/ML
INJECTION, SOLUTION INTRAMUSCULAR; INTRAVENOUS AS NEEDED
Status: DISCONTINUED | OUTPATIENT
Start: 2019-11-18 | End: 2019-11-18 | Stop reason: SURG

## 2019-11-18 RX ORDER — SODIUM CHLORIDE 9 MG/ML
20 INJECTION, SOLUTION INTRAVENOUS CONTINUOUS
Status: DISCONTINUED | OUTPATIENT
Start: 2019-11-18 | End: 2019-11-18 | Stop reason: HOSPADM

## 2019-11-18 RX ORDER — HEPARIN SODIUM 1000 [USP'U]/ML
INJECTION, SOLUTION INTRAVENOUS; SUBCUTANEOUS AS NEEDED
Status: DISCONTINUED | OUTPATIENT
Start: 2019-11-18 | End: 2019-11-18 | Stop reason: SURG

## 2019-11-18 RX ORDER — OXYCODONE HYDROCHLORIDE AND ACETAMINOPHEN 5; 325 MG/1; MG/1
1 TABLET ORAL EVERY 4 HOURS PRN
Status: DISCONTINUED | OUTPATIENT
Start: 2019-11-18 | End: 2019-11-18 | Stop reason: HOSPADM

## 2019-11-18 RX ORDER — CEFAZOLIN SODIUM 2 G/50ML
2000 SOLUTION INTRAVENOUS ONCE
Status: COMPLETED | OUTPATIENT
Start: 2019-11-18 | End: 2019-11-18

## 2019-11-18 RX ORDER — ONDANSETRON 2 MG/ML
4 INJECTION INTRAMUSCULAR; INTRAVENOUS ONCE AS NEEDED
Status: DISCONTINUED | OUTPATIENT
Start: 2019-11-18 | End: 2019-11-18 | Stop reason: HOSPADM

## 2019-11-18 RX ADMIN — FENTANYL CITRATE 25 MCG: 50 INJECTION, SOLUTION INTRAMUSCULAR; INTRAVENOUS at 10:38

## 2019-11-18 RX ADMIN — HEPARIN SODIUM 2000 UNITS: 1000 INJECTION, SOLUTION INTRAVENOUS; SUBCUTANEOUS at 09:32

## 2019-11-18 RX ADMIN — FENTANYL CITRATE 25 MCG: 50 INJECTION, SOLUTION INTRAMUSCULAR; INTRAVENOUS at 10:31

## 2019-11-18 RX ADMIN — PROPOFOL 30 MG: 10 INJECTION, EMULSION INTRAVENOUS at 08:35

## 2019-11-18 RX ADMIN — SODIUM CHLORIDE 20 ML/HR: 0.9 INJECTION, SOLUTION INTRAVENOUS at 07:33

## 2019-11-18 RX ADMIN — CEFAZOLIN SODIUM 2000 MG: 2 SOLUTION INTRAVENOUS at 08:27

## 2019-11-18 RX ADMIN — MIDAZOLAM 2 MG: 1 INJECTION INTRAMUSCULAR; INTRAVENOUS at 08:35

## 2019-11-18 RX ADMIN — FENTANYL CITRATE 100 MCG: 50 INJECTION, SOLUTION INTRAMUSCULAR; INTRAVENOUS at 08:35

## 2019-11-18 RX ADMIN — FENTANYL CITRATE 25 MCG: 50 INJECTION, SOLUTION INTRAMUSCULAR; INTRAVENOUS at 10:44

## 2019-11-18 RX ADMIN — OXYCODONE HYDROCHLORIDE AND ACETAMINOPHEN 1 TABLET: 5; 325 TABLET ORAL at 11:34

## 2019-11-18 RX ADMIN — PROPOFOL 75 MCG/KG/MIN: 10 INJECTION, EMULSION INTRAVENOUS at 08:41

## 2019-11-18 RX ADMIN — CEFAZOLIN SODIUM 2000 MG: 2 SOLUTION INTRAVENOUS at 08:45

## 2019-11-18 RX ADMIN — FENTANYL CITRATE 25 MCG: 50 INJECTION, SOLUTION INTRAMUSCULAR; INTRAVENOUS at 10:50

## 2019-11-18 NOTE — ANESTHESIA PREPROCEDURE EVALUATION
Review of Systems/Medical History  Patient summary reviewed  Chart reviewed  History of anesthetic complications PONV    Cardiovascular  Hyperlipidemia, Hypertension controlled,    Pulmonary  Asthma , Shortness of breath,        GI/Hepatic    GERD well controlled,        Negative  ROS        Endo/Other  History of thyroid disease , hypothyroidism,      GYN  Negative gynecology ROS          Hematology  Negative hematology ROS      Musculoskeletal    Arthritis     Neurology    CVA , no residual symptoms,    Psychology   Anxiety, Depression ,              Physical Exam    Airway    Mallampati score: II  TM Distance: >3 FB  Neck ROM: full     Dental   upper dentures and lower dentures,     Cardiovascular  Rhythm: regular, Rate: normal, Cardiovascular exam normal    Pulmonary  Pulmonary exam normal Breath sounds clear to auscultation,     Other Findings        Anesthesia Plan  ASA Score- 3     Anesthesia Type- IV sedation with anesthesia with ASA Monitors  Additional Monitors:   Airway Plan:         Plan Factors-    Induction- intravenous  Postoperative Plan- Plan for postoperative opioid use  Informed Consent- Anesthetic plan and risks discussed with patient  I personally reviewed this patient with the CRNA  Discussed and agreed on the Anesthesia Plan with the CRNA  Luisa Magaña

## 2019-11-18 NOTE — ANESTHESIA POSTPROCEDURE EVALUATION
Post-Op Assessment Note    CV Status:  Stable  Pain Score: 0    Pain management: adequate     Mental Status:  Alert and awake   Hydration Status:  Stable   PONV Controlled:  None   Airway Patency:  Patent   Post Op Vitals Reviewed: Yes      Staff: Anesthesiologist           BP      Temp      Pulse     Resp      SpO2

## 2019-11-18 NOTE — INTERVAL H&P NOTE
H&P reviewed  After examining the patient I find no changes in the patients condition since the H&P had been written      Vitals:    11/18/19 0720   BP: 146/70   Pulse: 80   Resp: 18   Temp: 98 3 °F (36 8 °C)   SpO2: 93%

## 2019-11-18 NOTE — OP NOTE
OPERATIVE REPORT  PATIENT NAME: Hilario Lal    :  1960  MRN: [id number]  Pt Location: [facility] OR ROOM 03    SURGERY DATE: 2019    Surgeon(s) and Role:     * Von Bermudez MD - Primary     * Jessy Butterfield PA-C - Assisting    Preop Diagnosis:  Stage 4 chronic kidney disease ([address] ) [N18 4]  End stage renal disease ([facility] [address] ) [N18 6]    Post-Op Diagnosis Codes:     * Stage 4 chronic kidney disease ([address] ) [N18 4]     * End stage renal disease ([address] ) [N18 6]    Procedure(s) (LRB):  CREATION FISTULA ARTERIOVENOUS (AV) left wrists possible left upper (Left)    Specimen(s):  * No specimens in log *    Estimated Blood Loss:   Minimal    Drains:  * No LDAs found *    Anesthesia Type:   IV Sedation with Anesthesia    Operative Indications:  Stage 4 chronic kidney disease (HCC) [N18 4]  End stage renal disease ([address] ) [N18 6]      Operative Findings:  See op report    Complications:   None    Procedure and Technique:  The patient Leanne Sandy was identified in the operating room after adequate IV sedation/laryngeal mask anesthesia was obtained the left arm was prepped and draped using chlorpropamide prep and sterile drape Infiltration with 1% Xylocaine incision was made over the cephalic vein sharp dissection down through the skin and subcutaneous tissue the vein was mobilized for several centimeters  A non-crushing vascular clamp was placed proximally and the vein dilated nicely with a heparin Papaverin solution  Deepening our incision the deep fascia was incised the distal radial artery was identified and vessel loops were placed proximally and distally  An arteriotomy was made the artery was locally heparinized, the vein was cut to size and anastomosed  to the artery and an end to side fashion using a running 7-0 prolene   Prior to completion the vein was irrigated with heparin/papaverine solution, the artery was dilated with a 1 5 mm coronary dilator and the anastomosis was completed an excellent thrill to be felt immediately into the outflow tract  An excellent thrill could be felt immediately throughout the wrist into the mid forearm  The wound was irrigated with copious amounts of antibiotic solution, the wound was closed in 2 layers using running 3-0 Monocryl at the subcutaneous level and 4-0 Monocryl in a subcuticular fashion  Sterile dressing was place and he was taken to the recovery room in stable condition       I was present for the entire procedure, A qualified resident physician was not available and A physician assistant was required during the procedure for retraction tissue handling,dissection and suturing    Vascular Quality Initiative - Hemodialysis Access Placement    Previous Access: forearm fistula (left)    Preop ARTERIAL evaluation and/or treatment: duplex    Preop VENOUS evaluation and/or treatment: ultrasound mapping    Status: Outpatient    Anesthesia: Regional     Side:left    Access Type: AVF     Patient Disposition:  PACU  and hemodynamically stable    SIGNATURE: Carmen Urbano MD  DATE: November 18, 2019  TIME: 10:11 AM

## 2019-11-18 NOTE — DISCHARGE INSTRUCTIONS
DISCHARGE INSTRUCTIONS  ARM SURGERY    Following discharge from the hospital, you may have some questions about your operation, your activities or your general condition  These instructions may answer some of your questions and help you adjust during the first few weeks following your operation  ACTIVITY:    Limit use of the operated arm to what is absolutely necessary for the first day after surgery  On the second day after surgery, you may start to increase use of your arm as tolerated  One week after surgery, you should start to exercise your hand on the side of the dialysis graft by squeezing a ball  This increases blood flow in your graft and arm so your graft will function better  DIET:   Resume your normal diet  Try to eat low fat and low cholesterol foods  INCISION:   Your surgeon may have chosen to use a type of adhesive glue to close your incision  There are stitches present under the skin, which will absorb on their own  The glue is used to cover the incision, assist in closure, and prevent contamination  This adhesive will darken and peel away on its own within one to two weeks  You may shower after your surgery if there is adhesive glue present, but do not scrub the incision  If you do not have this adhesive glue, you may include the operated area in a shower on the third day following surgery  It is normal to have some pain at the surgical site  You will receive a prescription for pain medicine at the time of discharge  It is normal to have some bruising, swelling or mild discoloration around the incision  If increasing redness or pain develops at the incision site or severe pain, numbness, or weakness occurs in the hand, call our office immediately  Numbness in the region of the incision may occur following the surgery  This normally resolves in six to twelve months  ARM SWELLING: Most patients have some noticeable arm swelling after surgery    This usually disappears within a few weeks  If swelling is present, elevate the arm whenever possible  RESTRICTIONS: Do not have blood draws, IVs, or blood pressures performed on the operated arm  PLEASE CALL THE OFFICE IF YOU HAVE ANY QUESTIONS  [phone] [facility] [phone] [facility] FREE [phone]  [address] [facility] , [address], [place], [address]  [address], [address], [place], [address]  [address]   [facility], [place], [address] [facility], [address]    [address], [address]  [facility], [address] [facility], [address] [facility] [address]   [address] [facility], [address] [facility], [address]  [facility], [address] [facility], [facility], [address]

## 2019-11-18 NOTE — INTERVAL H&P NOTE
H&P reviewed  After examining the patient I find no changes in the patients condition since the H&P had been written  Plan:  Left arm AV fistula wrist possible upper arm      Vitals:    11/18/19 0720   BP: 146/70   Pulse: 80   Resp: 18   Temp: 98 3 °F (36 8 °C)   SpO2: 93%

## 2019-11-19 ENCOUNTER — TELEPHONE (OUTPATIENT)
Dept: OBGYN CLINIC | Facility: CLINIC | Age: 59
End: 2019-11-19

## 2019-11-19 ENCOUNTER — TELEPHONE (OUTPATIENT)
Dept: VASCULAR SURGERY | Facility: CLINIC | Age: 59
End: 2019-11-19

## 2019-11-19 NOTE — TELEPHONE ENCOUNTER
11/19/2019 Left message for pt to call [phone] to clarify what appt she wants cancelled and asks if she wants to reschedule

## 2019-11-19 NOTE — TELEPHONE ENCOUNTER
Pt s/p creation avf yesterday by Dr Kimberly Mclean  She notes since surgery her thumb feels numb  It is not painful, she can move it, it is not discolored, it is not cold  Advised if it worsens or she develops any of the above symptoms she should notify us immediately  She will keep her scheduled f/u ov and will call w/ any other concerns

## 2019-11-19 NOTE — TELEPHONE ENCOUNTER
Discussed w/ S Vitaliy FELDER  Nothing further to do at this time  Pt was instructed to call if any changes or additional concerns

## 2019-11-21 DIAGNOSIS — F31.81 BIPOLAR 2 DISORDER (HCC): Chronic | ICD-10-CM

## 2019-11-21 RX ORDER — CLONAZEPAM 0.5 MG/1
0.5 TABLET ORAL 3 TIMES DAILY
Qty: 270 TABLET | Refills: 0 | Status: SHIPPED | OUTPATIENT
Start: 2019-11-21 | End: 2020-02-20 | Stop reason: SDUPTHER

## 2019-11-22 ENCOUNTER — TELEPHONE (OUTPATIENT)
Dept: VASCULAR SURGERY | Facility: CLINIC | Age: 59
End: 2019-11-22

## 2019-11-22 DIAGNOSIS — I77.0 A-V FISTULA (HCC): ICD-10-CM

## 2019-11-22 DIAGNOSIS — N18.6 END STAGE RENAL DISEASE (HCC): Primary | ICD-10-CM

## 2019-11-22 RX ORDER — OXYCODONE HYDROCHLORIDE AND ACETAMINOPHEN 5; 325 MG/1; MG/1
1 TABLET ORAL EVERY 6 HOURS PRN
Qty: 8 TABLET | Refills: 0 | Status: SHIPPED | OUTPATIENT
Start: 2019-11-22 | End: 2019-11-24

## 2019-11-22 NOTE — TELEPHONE ENCOUNTER
I reviewed the chart and PAPMED  Patient had her Percocet Rx filled pre-op on 11/14 (rec'd 10 tabs), surgery on 11/18 and rec'd another 10 tabs  She also has Tramadol from another provider that was filled on 11/8 (20 tabs)  She can get a refill for 2 additional days but follow directions below to taper off of Percocet  Start taking 1/2 Percocet with 1 Tylenol 325mg q6hour    If she does not feel she needs the Percocet, she should take Tylenol 2 tabs (325mg) q6hr and use 1/2 Percocet q6hr for breakthrough pain without taking extra Tylenol  Please advise her that she can only take 4000mg of Tylenol daily (12 pills max)  She should continue to prop the arm, exercise the arm/hand

## 2019-11-22 NOTE — TELEPHONE ENCOUNTER
Percocet order refill for 2 days only (8tabs) for continued acute pain; post-op AVF from 11/18/19  See previous clinical note for instructions and weaning of oxycodone

## 2019-11-22 NOTE — TELEPHONE ENCOUNTER
Patient of Dr Ashley Pineda is s/p creation of L AVF on 11/18/19  She states she has 2 more tablets of Percocet left, and is calling to see if she can get a refill of it for over the weekend  Advised patient this will have to be addressed by triage provider  If it is approved, she would like it sent to [facility] in [place]

## 2019-12-02 ENCOUNTER — OFFICE VISIT (OUTPATIENT)
Dept: VASCULAR SURGERY | Facility: CLINIC | Age: 59
End: 2019-12-02

## 2019-12-02 VITALS
BODY MASS INDEX: 31.55 KG/M2 | SYSTOLIC BLOOD PRESSURE: 142 MMHG | HEIGHT: 67 IN | WEIGHT: 201 LBS | HEART RATE: 96 BPM | TEMPERATURE: 99.3 F | RESPIRATION RATE: 18 BRPM | DIASTOLIC BLOOD PRESSURE: 90 MMHG

## 2019-12-02 DIAGNOSIS — I77.0 AVF (ARTERIOVENOUS FISTULA) (HCC): ICD-10-CM

## 2019-12-02 DIAGNOSIS — N18.6 END STAGE RENAL DISEASE (HCC): ICD-10-CM

## 2019-12-02 DIAGNOSIS — T82.898A STEAL SYNDROME DIALYSIS VASCULAR ACCESS, INITIAL ENCOUNTER (HCC): Primary | ICD-10-CM

## 2019-12-02 DIAGNOSIS — T82.898A STEAL SYNDROME AS COMPLICATION OF DIALYSIS ACCESS, INITIAL ENCOUNTER (HCC): ICD-10-CM

## 2019-12-02 PROCEDURE — 99024 POSTOP FOLLOW-UP VISIT: CPT | Performed by: SURGERY

## 2019-12-02 RX ORDER — AMLODIPINE BESYLATE 5 MG/1
TABLET ORAL
Status: ON HOLD | COMMUNITY
Start: 2019-11-15 | End: 2020-02-06

## 2019-12-02 RX ORDER — GABAPENTIN 100 MG/1
100 CAPSULE ORAL 2 TIMES DAILY
Qty: 28 CAPSULE | Refills: 0 | Status: SHIPPED | OUTPATIENT
Start: 2019-12-02 | End: 2020-01-08 | Stop reason: ALTCHOICE

## 2019-12-02 NOTE — PATIENT INSTRUCTIONS
Steal syndrome dialysis vascular access [facility])  S/p Left Carlitos (radiocephalic) arteriovenous fistula creation 11/18/19  Doing well but reporting left hand paresthesias/cramping, worst in the thumb  Left hand  4/5  Palpable brachial, excellent thrill over AVF, palpable ulnar artery, multiphasic palmar arch and digital arterial doppler signals, slightly dampened in 1st and 2nd digits  Incision healed  Has not started dialysis yet, discussion to start in 8 weeks or so per her nephrologist      -Will obtain hemodialysis access scan in 2 weeks to assess flow volumes    -Discussed steal syndrome and associated signs/symptoms - she has mild steal symptoms, not disabling  Will obtain arterial duplex with digital pressures with AVF open and compressed  -Encouraged left hand exercises and continued activity  -Will start low dose gabapentin for pain     End stage renal disease (HCC)  CKD stage 4 not on HD yet  S/p Left AVF creation, will obtain hemodialysis scan at 1 month from surgery to assess flow volumes/adequacy for dialysis

## 2019-12-02 NOTE — LETTER
December 2, 2019     Ritu Can, [address]  [address] [address]    Patient: Parish Sandra   YOB: 1960   Date of Visit: 12/2/2019       Dear Dr Ivette Aguilar: Thank you for referring Romulo Hernández to me for evaluation  Below are the relevant portions of my assessment and plan of care  Diagnoses and all orders for this visit:    Steal syndrome dialysis vascular access [facility])  S/p Left Carlitos (radiocephalic) arteriovenous fistula creation 11/18/19  Doing well but reporting left hand paresthesias/cramping, worst in the thumb  Left hand  4/5  Palpable brachial, excellent thrill over AVF, palpable ulnar artery, multiphasic palmar arch and digital arterial doppler signals, slightly dampened in 1st and 2nd digits  Incision healed  Has not started dialysis yet, discussion to start in 8 weeks or so per her nephrologist      -Will obtain hemodialysis access scan in 2 weeks to assess flow volumes    -Discussed steal syndrome and associated signs/symptoms - she has mild steal symptoms, not disabling  Will obtain arterial duplex with digital pressures with AVF open and compressed  -Encouraged left hand exercises and continued activity  -Will start low dose gabapentin for pain     End stage renal disease (HCC)  CKD stage 4 not on HD yet  S/p Left AVF creation, will obtain hemodialysis scan at 1 month from surgery to assess flow volumes/adequacy for dialysis            If you have questions, please do not hesitate to call me  I look forward to following Nereida Chavez along with you           Sincerely,        Molly Christian MD        CC: No Recipients

## 2019-12-18 ENCOUNTER — APPOINTMENT (OUTPATIENT)
Dept: LAB | Facility: HOSPITAL | Age: 59
End: 2019-12-18
Attending: INTERNAL MEDICINE
Payer: MEDICARE

## 2019-12-18 ENCOUNTER — TRANSCRIBE ORDERS (OUTPATIENT)
Dept: ADMINISTRATIVE | Facility: HOSPITAL | Age: 59
End: 2019-12-18

## 2019-12-18 ENCOUNTER — HOSPITAL ENCOUNTER (OUTPATIENT)
Dept: NON INVASIVE DIAGNOSTICS | Age: 59
Discharge: HOME | End: 2019-12-18
Payer: MEDICARE

## 2019-12-18 DIAGNOSIS — N18.4 CHRONIC KIDNEY DISEASE, STAGE IV (SEVERE) (HCC): ICD-10-CM

## 2019-12-18 DIAGNOSIS — R60.1 GENERALIZED EDEMA: Primary | ICD-10-CM

## 2019-12-18 DIAGNOSIS — R60.1 GENERALIZED EDEMA: ICD-10-CM

## 2019-12-18 DIAGNOSIS — I77.0 AVF (ARTERIOVENOUS FISTULA) (HCC): ICD-10-CM

## 2019-12-18 LAB
ALBUMIN SERPL BCP-MCNC: 4.1 G/DL (ref 3.5–5)
ALP SERPL-CCNC: 241 U/L (ref 46–116)
ALT SERPL W P-5'-P-CCNC: 27 U/L (ref 12–78)
ANION GAP SERPL CALCULATED.3IONS-SCNC: 6 MMOL/L (ref 4–13)
AST SERPL W P-5'-P-CCNC: 17 U/L (ref 5–45)
BACTERIA UR QL AUTO: NORMAL /HPF
BASOPHILS # BLD AUTO: 0.1 THOUSANDS/ΜL (ref 0–0.1)
BASOPHILS NFR BLD AUTO: 2 % (ref 0–1)
BILIRUB SERPL-MCNC: 0.3 MG/DL (ref 0.2–1)
BILIRUB UR QL STRIP: NEGATIVE
BUN SERPL-MCNC: 40 MG/DL (ref 5–25)
CALCIUM ALBUM COR SERPL-MCNC: 10.3 MG/DL (ref 8.3–10.1)
CALCIUM SERPL-MCNC: 10.4 MG/DL (ref 8.3–10.1)
CHLORIDE SERPL-SCNC: 111 MMOL/L (ref 100–108)
CLARITY UR: CLEAR
CO2 SERPL-SCNC: 25 MMOL/L (ref 21–32)
COLOR UR: YELLOW
CREAT SERPL-MCNC: 1.96 MG/DL (ref 0.6–1.3)
CREAT UR-MCNC: 46 MG/DL
EOSINOPHIL # BLD AUTO: 0.72 THOUSAND/ΜL (ref 0–0.61)
EOSINOPHIL NFR BLD AUTO: 11 % (ref 0–6)
ERYTHROCYTE [DISTWIDTH] IN BLOOD BY AUTOMATED COUNT: 13.1 % (ref 11.6–15.1)
GFR SERPL CREATININE-BSD FRML MDRD: 28 ML/MIN/1.73SQ M
GLUCOSE P FAST SERPL-MCNC: 109 MG/DL (ref 65–99)
GLUCOSE UR STRIP-MCNC: NEGATIVE MG/DL
HCT VFR BLD AUTO: 41.8 % (ref 34.8–46.1)
HGB BLD-MCNC: 13 G/DL (ref 11.5–15.4)
HGB UR QL STRIP.AUTO: NEGATIVE
HYALINE CASTS #/AREA URNS LPF: NORMAL /LPF
IMM GRANULOCYTES # BLD AUTO: 0.01 THOUSAND/UL (ref 0–0.2)
IMM GRANULOCYTES NFR BLD AUTO: 0 % (ref 0–2)
KETONES UR STRIP-MCNC: NEGATIVE MG/DL
LEUKOCYTE ESTERASE UR QL STRIP: ABNORMAL
LYMPHOCYTES # BLD AUTO: 1.2 THOUSANDS/ΜL (ref 0.6–4.47)
LYMPHOCYTES NFR BLD AUTO: 19 % (ref 14–44)
MAGNESIUM SERPL-MCNC: 2.4 MG/DL (ref 1.6–2.6)
MCH RBC QN AUTO: 31.7 PG (ref 26.8–34.3)
MCHC RBC AUTO-ENTMCNC: 31.1 G/DL (ref 31.4–37.4)
MCV RBC AUTO: 102 FL (ref 82–98)
MONOCYTES # BLD AUTO: 0.46 THOUSAND/ΜL (ref 0.17–1.22)
MONOCYTES NFR BLD AUTO: 7 % (ref 4–12)
NEUTROPHILS # BLD AUTO: 3.82 THOUSANDS/ΜL (ref 1.85–7.62)
NEUTS SEG NFR BLD AUTO: 61 % (ref 43–75)
NITRITE UR QL STRIP: NEGATIVE
NON-SQ EPI CELLS URNS QL MICRO: NORMAL /HPF
NRBC BLD AUTO-RTO: 0 /100 WBCS
PH UR STRIP.AUTO: 6.5 [PH]
PHOSPHATE SERPL-MCNC: 3.9 MG/DL (ref 2.7–4.5)
PLATELET # BLD AUTO: 278 THOUSANDS/UL (ref 149–390)
PMV BLD AUTO: 10.9 FL (ref 8.9–12.7)
POTASSIUM SERPL-SCNC: 5.1 MMOL/L (ref 3.5–5.3)
PROT SERPL-MCNC: 7.8 G/DL (ref 6.4–8.2)
PROT UR STRIP-MCNC: NEGATIVE MG/DL
PROT UR-MCNC: 12 MG/DL
PROT/CREAT UR: 0.26 MG/G{CREAT} (ref 0–0.1)
PTH-INTACT SERPL-MCNC: 125 PG/ML (ref 18.4–80.1)
RBC # BLD AUTO: 4.1 MILLION/UL (ref 3.81–5.12)
RBC #/AREA URNS AUTO: NORMAL /HPF
SODIUM SERPL-SCNC: 142 MMOL/L (ref 136–145)
SP GR UR STRIP.AUTO: 1.01 (ref 1–1.03)
URATE SERPL-MCNC: 5.6 MG/DL (ref 2–6.8)
UROBILINOGEN UR QL STRIP.AUTO: 0.2 E.U./DL
WBC # BLD AUTO: 6.31 THOUSAND/UL (ref 4.31–10.16)
WBC #/AREA URNS AUTO: NORMAL /HPF

## 2019-12-18 PROCEDURE — 80053 COMPREHEN METABOLIC PANEL: CPT

## 2019-12-18 PROCEDURE — 84156 ASSAY OF PROTEIN URINE: CPT | Performed by: INTERNAL MEDICINE

## 2019-12-18 PROCEDURE — 84550 ASSAY OF BLOOD/URIC ACID: CPT

## 2019-12-18 PROCEDURE — 82570 ASSAY OF URINE CREATININE: CPT | Performed by: INTERNAL MEDICINE

## 2019-12-18 PROCEDURE — 93990 DOPPLER FLOW TESTING: CPT

## 2019-12-18 PROCEDURE — 85025 COMPLETE CBC W/AUTO DIFF WBC: CPT

## 2019-12-18 PROCEDURE — 81001 URINALYSIS AUTO W/SCOPE: CPT | Performed by: INTERNAL MEDICINE

## 2019-12-18 PROCEDURE — 83735 ASSAY OF MAGNESIUM: CPT

## 2019-12-18 PROCEDURE — 36415 COLL VENOUS BLD VENIPUNCTURE: CPT

## 2019-12-18 PROCEDURE — 84100 ASSAY OF PHOSPHORUS: CPT

## 2019-12-18 PROCEDURE — 83970 ASSAY OF PARATHORMONE: CPT

## 2019-12-19 NOTE — PROGRESS NOTES
Labs were reviewed, calcium now elevated, phosphorus was elevated will have her stop Calcium Acetate and discuss further at her follow-up appointment

## 2019-12-19 NOTE — PROGRESS NOTES
Nereida Chavez returned my call  She is aware of her elevated calcium and phosphorus levels  Nereida Chavez understands to stop taking calcium acetate for now and additional steps will be discussed at next week's appointment   No other concerns at the moment 12/19/19

## 2019-12-21 PROCEDURE — 93930 UPPER EXTREMITY STUDY: CPT | Performed by: SURGERY

## 2019-12-26 ENCOUNTER — OFFICE VISIT (OUTPATIENT)
Dept: NEPHROLOGY | Facility: HOSPITAL | Age: 59
End: 2019-12-26
Payer: MEDICARE

## 2019-12-26 VITALS
DIASTOLIC BLOOD PRESSURE: 77 MMHG | HEIGHT: 68 IN | SYSTOLIC BLOOD PRESSURE: 138 MMHG | BODY MASS INDEX: 30.89 KG/M2 | WEIGHT: 203.8 LBS | HEART RATE: 92 BPM

## 2019-12-26 DIAGNOSIS — N18.4 CKD (CHRONIC KIDNEY DISEASE) STAGE 4, GFR 15-29 ML/MIN (HCC): Primary | ICD-10-CM

## 2019-12-26 DIAGNOSIS — N18.4 STAGE 4 CHRONIC KIDNEY DISEASE (HCC): ICD-10-CM

## 2019-12-26 DIAGNOSIS — N25.81 SECONDARY RENAL HYPERPARATHYROIDISM (HCC): ICD-10-CM

## 2019-12-26 DIAGNOSIS — E21.3 HYPERPARATHYROIDISM (HCC): ICD-10-CM

## 2019-12-26 DIAGNOSIS — I10 ESSENTIAL HYPERTENSION: ICD-10-CM

## 2019-12-26 PROCEDURE — 99214 OFFICE O/P EST MOD 30 MIN: CPT | Performed by: INTERNAL MEDICINE

## 2019-12-26 RX ORDER — AMILORIDE HYDROCHLORIDE 5 MG/1
5 TABLET ORAL 2 TIMES DAILY
Refills: 3 | COMMUNITY
Start: 2019-12-20 | End: 2020-07-06

## 2019-12-26 NOTE — PATIENT INSTRUCTIONS
Chronic Kidney Disease   WHAT YOU NEED TO KNOW:   Chronic kidney disease (CKD) is the gradual and permanent loss of kidney function  It is also called chronic kidney failure, or chronic renal insufficiency  Normally, the kidneys remove fluid, chemicals, and waste from your blood  These wastes are turned into urine by your kidneys  CKD may worsen over time and lead to kidney failure  DISCHARGE INSTRUCTIONS:   Return to the emergency department if:   · You are confused and very drowsy  · You have a seizure  · You have shortness of breath  Contact your healthcare provider if:   · You suddenly gain or lose more weight than your healthcare provider has told you is okay  · You have itchy skin or a rash  · You urinate more or less than you normally do  · You have blood in your urine  · You have nausea and repeated vomiting  · You have fatigue or muscle weakness  · You have hiccups that will not stop  · You have questions or concerns about your condition or care  Medicines:   · Medicines  may be given to decrease blood pressure and get rid of extra fluid  You may also receive medicine to manage health conditions that may occur with CKD, such as anemia, diabetes, and heart disease  · Take your medicine as directed  Contact your healthcare provider if you think your medicine is not helping or if you have side effects  Tell him or her if you are allergic to any medicine  Keep a list of the medicines, vitamins, and herbs you take  Include the amounts, and when and why you take them  Bring the list or the pill bottles to follow-up visits  Carry your medicine list with you in case of an emergency  Follow up with your healthcare provider as directed: You will need to return for tests to monitor your kidney function  You may also be referred to a kidney specialist  Write down your questions so you remember to ask them during your visits  Manage other health conditions:   Follow your healthcare provider's directions on how to manage diabetes, high blood pressure, and heart disease  These conditions can make CKD worse  Talk to your healthcare provider before you take over-the-counter medicine  Medicines such as NSAIDs, stomach medicine, or laxatives may harm your kidneys  Weigh yourself daily:  Ask your healthcare provider what your weight should be  Ask how much liquid you should drink each day  CKD may cause you to gain or lose weight rapidly  Weigh yourself every day  Write down your weight, how much liquid you drink or eat, and how much you urinate each day  Contact your healthcare provider if your weight is higher or lower than it should be  Manage CKD:   · Maintain a healthy weight  Ask your healthcare provider how much you should weigh  Ask him to help you create a weight loss plan if you are overweight  · Exercise 30 to 60 minutes a day, 4 to 7 times a week, or as directed  Ask about the best exercise plan for you  Regular exercise can help you manage CKD, high blood pressure, and diabetes  · Follow your healthcare provider's advice about what to eat and drink  He may tell you to eat food low in sodium (salt), potassium, phosphorus, or protein  You may need to see a dietitian if you need help planning meals  Ask how much liquid to drink each day and which liquids are best for you  · Limit alcohol  Ask how much alcohol is safe for you to drink  A drink of alcohol is 12 ounces of beer, 5 ounces of wine, or 1½ ounces of liquor  · Do not smoke  Nicotine and other chemicals in cigarettes and cigars can cause lung and kidney damage  Ask your healthcare provider for information if you currently smoke and need help to quit  E-cigarettes or smokeless tobacco still contain nicotine  Talk to your healthcare provider before you use these products  · Ask your healthcare provider if you need vaccines    Infections such as pneumonia, influenza, and hepatitis can be more harmful or more likely to occur in a person who has CKD  Vaccines reduce your risk of infection with these viruses  © 2017 [facility] Information is for End User's use only and may not be sold, redistributed or otherwise used for commercial purposes  All illustrations and images included in CareNotes® are the copyrighted property of A D A M , Inc  or Everett Murphy  The above information is an  only  It is not intended as medical advice for individual conditions or treatments  Talk to your doctor, nurse or pharmacist before following any medical regimen to see if it is safe and effective for you

## 2020-01-02 ENCOUNTER — TELEPHONE (OUTPATIENT)
Dept: GASTROENTEROLOGY | Facility: CLINIC | Age: 60
End: 2020-01-02

## 2020-01-02 NOTE — TELEPHONE ENCOUNTER
Can you please write a letter for pt that states medical necessity for Linzess for pt assistance program? I also have paperwork to fill out so when complete, please give it to me and I will fax everything together and contact pt  Thanks!

## 2020-01-02 NOTE — LETTER
January 3, 2020     [employer] Patient Assistance    Patient: Ayleen Grayson   YOB: 1960      To whom it may concern:     Ms Dena Sy is filing an appeal for coverage of Linzess 290 mcg   She has been maintained on this medication for over one year with good results      Medication cost is prohibitive for patient even with insurance coverage           Sincerely,     Sydnie Wilkins MD

## 2020-01-03 NOTE — TELEPHONE ENCOUNTER
LVM for pt that assistance program paperwork and letter has been completed and faxed to Justin [employer]  Asked her to call and let me know if she would like to pick a copy up in the office for her records  Also, paperwork scanned into Epic

## 2020-01-08 ENCOUNTER — OFFICE VISIT (OUTPATIENT)
Dept: FAMILY MEDICINE CLINIC | Facility: HOSPITAL | Age: 60
End: 2020-01-08
Payer: MEDICARE

## 2020-01-08 VITALS
HEART RATE: 103 BPM | BODY MASS INDEX: 30.04 KG/M2 | SYSTOLIC BLOOD PRESSURE: 118 MMHG | DIASTOLIC BLOOD PRESSURE: 68 MMHG | OXYGEN SATURATION: 97 % | WEIGHT: 198.2 LBS | HEIGHT: 68 IN

## 2020-01-08 DIAGNOSIS — I10 ESSENTIAL HYPERTENSION: ICD-10-CM

## 2020-01-08 DIAGNOSIS — F40.01 PANIC DISORDER WITH AGORAPHOBIA: ICD-10-CM

## 2020-01-08 DIAGNOSIS — F31.4 BIPOLAR 1 DISORDER, DEPRESSED, SEVERE (HCC): ICD-10-CM

## 2020-01-08 DIAGNOSIS — E66.9 OBESITY (BMI 30.0-34.9): ICD-10-CM

## 2020-01-08 DIAGNOSIS — N18.6 END STAGE RENAL DISEASE (HCC): ICD-10-CM

## 2020-01-08 DIAGNOSIS — M25.561 CHRONIC PAIN OF BOTH KNEES: ICD-10-CM

## 2020-01-08 DIAGNOSIS — I77.0 AVF (ARTERIOVENOUS FISTULA) (HCC): ICD-10-CM

## 2020-01-08 DIAGNOSIS — K52.9 GASTROENTERITIS: Primary | ICD-10-CM

## 2020-01-08 DIAGNOSIS — G89.29 CHRONIC PAIN OF BOTH KNEES: ICD-10-CM

## 2020-01-08 DIAGNOSIS — Z11.4 SCREENING FOR HIV (HUMAN IMMUNODEFICIENCY VIRUS): ICD-10-CM

## 2020-01-08 DIAGNOSIS — M25.562 CHRONIC PAIN OF BOTH KNEES: ICD-10-CM

## 2020-01-08 DIAGNOSIS — T82.898S STEAL SYNDROME AS COMPLICATION OF DIALYSIS ACCESS, SEQUELA: ICD-10-CM

## 2020-01-08 DIAGNOSIS — L20.82 FLEXURAL ECZEMA: ICD-10-CM

## 2020-01-08 PROBLEM — Z98.890 POSTOPERATIVE STATE: Status: RESOLVED | Noted: 2019-07-08 | Resolved: 2020-01-08

## 2020-01-08 PROCEDURE — 99214 OFFICE O/P EST MOD 30 MIN: CPT | Performed by: INTERNAL MEDICINE

## 2020-01-08 RX ORDER — FLUOCINONIDE 0.5 MG/G
OINTMENT TOPICAL 2 TIMES DAILY
Qty: 60 G | Refills: 1 | Status: SHIPPED | OUTPATIENT
Start: 2020-01-08 | End: 2020-03-18 | Stop reason: SDUPTHER

## 2020-01-08 NOTE — ASSESSMENT & PLAN NOTE
No recent panic but has ongoing panic attacks- seeing Dr John Montelongo now at [facility]

## 2020-01-08 NOTE — ASSESSMENT & PLAN NOTE
Has right  Forearm access placed in November- no dialysis yet- follows closely with Dr Samantha Nance- to see in March

## 2020-01-08 NOTE — ASSESSMENT & PLAN NOTE
BMI Counseling: Body mass index is 30 14 kg/m²  The BMI is above normal  Exercise recommendations include exercising 3-5 times per week   drinking water here this am

## 2020-01-08 NOTE — PROGRESS NOTES
Assessment/Plan:             Problem List Items Addressed This Visit        Cardiovascular and Mediastinum    Essential hypertension    Steal syndrome dialysis vascular access [facility])       Genitourinary    End stage renal disease ([facility] [address] )     Has right  Forearm access placed in November- no dialysis yet- follows closely with Dr Ana Maria Khan- to see in March            Other    B[facility] disorder, depressed[facility]    Panic disorder with agoraphobia     No recent panic but has ongoing panic attacks- seeing Dr Katie Cerna now at          Obesity (BMI 30 0-34  9)     BMI Counseling: Body mass index is 30 14 kg/m²  The BMI is above normal  Exercise recommendations include exercising 3-5 times per week  drinking water here this am            Other Visit Diagnoses     Gastroenteritis    -  Primary            Subjective:      Patient ID: Shaina Meehan is a 64 y o  female    1  Gastroenteritis- started on Sat 1/4/129 with nausea and vomiting a few times- had some meds from prior hosptial visit  And that helped - now resolved   Had a few days of diarrhea but also  improved- had some midepigastric pain- no blood in bm or dark stools  Assume viral  Related issues  2  Knee pain- did pt in October- had right knee xrayed in may and left in October- trouble doing stairs- had seen ortho at [place] in 2014  3  Rash on arms and abdomen- itches first then becomes red  Has animals at home- no fleas or itching      The following portions of the patient's history were reviewed and updated as appropriate: allergies, current medications and problem list      Review of Systems   Constitutional: Positive for fatigue  HENT: Negative for congestion  Respiratory: Negative for shortness of breath and wheezing  Gastrointestinal: Negative for abdominal distention and abdominal pain  Musculoskeletal: Positive for arthralgias  Has knee pain- trouble walking up steps      All other systems reviewed and are negative          Objective:      Current Outpatient Medications:     acetaminophen (TYLENOL) 325 mg tablet, Take 650 mg by mouth every 6 (six) hours as needed for mild pain, Disp: , Rfl:     albuterol (PROAIR HFA) 90 mcg/act inhaler, Inhale 2 puffs every 6 (six) hours as needed for wheezing, Disp: 1 Inhaler, Rfl: 5    AMILoride 5 mg tablet, Take 5 mg by mouth 2 (two) times a day, Disp: , Rfl: 3    amLODIPine (NORVASC) 5 mg tablet, , Disp: , Rfl:     aspirin (ECOTRIN LOW STRENGTH) 81 mg EC tablet, Take 1 tablet (81 mg total) by mouth daily, Disp: 30 tablet, Rfl: 0    atorvastatin (LIPITOR) 40 mg tablet, Take 1 tablet (40 mg total) by mouth daily, Disp: 30 tablet, Rfl: 5    budesonide-formoterol (SYMBICORT) 160-4 5 mcg/act inhaler, Inhale 2 puffs 2 (two) times a day Rinse mouth after use , Disp: 3 Inhaler, Rfl: 3    carvedilol (COREG) 3 125 mg tablet, TAKE 1 TABLET BY MOUTH TWICE DAILY WITH MEALS, Disp: 60 tablet, Rfl: 2    clonazePAM (KlonoPIN) 0 5 mg tablet, Take 1 tablet (0 5 mg total) by mouth 3 (three) times a day, Disp: 270 tablet, Rfl: 0    fluvoxaMINE (LUVOX) 100 mg tablet, Take 100 mg by mouth 3 (three) times a day  , Disp: , Rfl:     lamoTRIgine (LaMICtal) 100 mg tablet, Take 100 mg by mouth 3 (three) times a day Pt takes 3 times daily, Disp: , Rfl:     linaCLOtide (LINZESS) 290 MCG CAPS, Take 1 capsule by mouth daily for 90 days, Disp: 90 capsule, Rfl: 3    omeprazole (PriLOSEC) 40 MG capsule, TAKE 1 CAPSULE BY MOUTH  DAILY AT BEDTIME, Disp: 90 capsule, Rfl: 3    Polyethylene Glycol 3350 (MIRALAX PO), Take by mouth as needed , Disp: , Rfl:     QUEtiapine (SEROQUEL) 300 mg tablet, Take 1 tablet (300 mg total) by mouth every morning 1 in the AM     ( also takes 400 mg at bedtime), Disp: 90 tablet, Rfl: 3    QUEtiapine (SEROquel) 400 MG tablet, 1 at bedtime, Disp: , Rfl:     traMADol (ULTRAM) 50 mg tablet, Take 1 tablet (50 mg total) by mouth 2 (two) times a day, Disp: 20 tablet, Rfl: 0    Blood pressure 118/68, pulse 103, height 5' 8" (1 727 m), weight 89 9 kg (198 lb 3 2 oz), SpO2 97 %, not currently breastfeeding  Physical Exam   Constitutional: She appears well-developed and well-nourished  She appears distressed  Mildly anxious   Eyes: Conjunctivae are normal  Right eye exhibits no discharge  Left eye exhibits no discharge  No scleral icterus  Neck: No thyromegaly present  Cardiovascular: Normal rate and regular rhythm  Exam reveals no friction rub  No murmur heard  Pulmonary/Chest: Effort normal and breath sounds normal  No stridor  No respiratory distress  Decreased breath sounds- no wheezes   Abdominal: Soft  Bowel sounds are normal  She exhibits no distension  There is no guarding  Musculoskeletal: She exhibits tenderness  patellar tendon region tenderness bilalterally- right posterior knee tenderness and fullness   Lymphadenopathy:     She has no cervical adenopathy  Skin: No rash noted  No erythema  Psychiatric: She has a normal mood and affect  Judgment and thought content normal    Nursing note and vitals reviewed

## 2020-01-13 ENCOUNTER — TELEPHONE (OUTPATIENT)
Dept: NEPHROLOGY | Facility: CLINIC | Age: 60
End: 2020-01-13

## 2020-01-13 DIAGNOSIS — I10 ESSENTIAL HYPERTENSION: ICD-10-CM

## 2020-01-13 DIAGNOSIS — N18.4 CKD (CHRONIC KIDNEY DISEASE) STAGE 4, GFR 15-29 ML/MIN (HCC): Primary | ICD-10-CM

## 2020-01-13 NOTE — TELEPHONE ENCOUNTER
Frank Cabrera called in stating that she hasn't been feeling well  Overall, she feels weak, fatigued and not herself  She reports that it is difficult to go up the steps and feels like her heart is working hard  At this time, Monroestacia Cabrera would prefer to hold off on going straight to the emergency room  She would like to get some blood work done first  Please let me know if you would like to order anything for her - thanks!

## 2020-01-13 NOTE — TELEPHONE ENCOUNTER
Per verbal conversation with Dr Madie Flores, okay to order blood work (same labs that are ordered for Feb ) I spoke with [facility], she will go to a [facility]'s lab to get this drawn  Labs have been ordered

## 2020-01-15 ENCOUNTER — APPOINTMENT (OUTPATIENT)
Dept: LAB | Facility: HOSPITAL | Age: 60
End: 2020-01-15
Attending: INTERNAL MEDICINE
Payer: MEDICARE

## 2020-01-15 DIAGNOSIS — Z11.4 SCREENING FOR HIV (HUMAN IMMUNODEFICIENCY VIRUS): ICD-10-CM

## 2020-01-15 DIAGNOSIS — I10 ESSENTIAL HYPERTENSION: ICD-10-CM

## 2020-01-15 DIAGNOSIS — N18.4 CKD (CHRONIC KIDNEY DISEASE) STAGE 4, GFR 15-29 ML/MIN (HCC): ICD-10-CM

## 2020-01-15 LAB
ALBUMIN SERPL BCP-MCNC: 3.8 G/DL (ref 3.5–5)
ALP SERPL-CCNC: 244 U/L (ref 46–116)
ALT SERPL W P-5'-P-CCNC: 27 U/L (ref 12–78)
ANION GAP SERPL CALCULATED.3IONS-SCNC: 7 MMOL/L (ref 4–13)
AST SERPL W P-5'-P-CCNC: 19 U/L (ref 5–45)
BACTERIA UR QL AUTO: NORMAL /HPF
BASOPHILS # BLD AUTO: 0.08 THOUSANDS/ΜL (ref 0–0.1)
BASOPHILS NFR BLD AUTO: 2 % (ref 0–1)
BILIRUB SERPL-MCNC: 0.38 MG/DL (ref 0.2–1)
BILIRUB UR QL STRIP: NEGATIVE
BUN SERPL-MCNC: 25 MG/DL (ref 5–25)
CALCIUM SERPL-MCNC: 9.5 MG/DL (ref 8.3–10.1)
CHLORIDE SERPL-SCNC: 115 MMOL/L (ref 100–108)
CLARITY UR: CLEAR
CO2 SERPL-SCNC: 24 MMOL/L (ref 21–32)
COLOR UR: YELLOW
CREAT SERPL-MCNC: 2.04 MG/DL (ref 0.6–1.3)
CREAT UR-MCNC: 31.8 MG/DL
EOSINOPHIL # BLD AUTO: 0.46 THOUSAND/ΜL (ref 0–0.61)
EOSINOPHIL NFR BLD AUTO: 9 % (ref 0–6)
ERYTHROCYTE [DISTWIDTH] IN BLOOD BY AUTOMATED COUNT: 14 % (ref 11.6–15.1)
GFR SERPL CREATININE-BSD FRML MDRD: 26 ML/MIN/1.73SQ M
GLUCOSE P FAST SERPL-MCNC: 107 MG/DL (ref 65–99)
GLUCOSE UR STRIP-MCNC: NEGATIVE MG/DL
HCT VFR BLD AUTO: 37.9 % (ref 34.8–46.1)
HGB BLD-MCNC: 12 G/DL (ref 11.5–15.4)
HGB UR QL STRIP.AUTO: NEGATIVE
HYALINE CASTS #/AREA URNS LPF: NORMAL /LPF
IMM GRANULOCYTES # BLD AUTO: 0 THOUSAND/UL (ref 0–0.2)
IMM GRANULOCYTES NFR BLD AUTO: 0 % (ref 0–2)
KETONES UR STRIP-MCNC: NEGATIVE MG/DL
LEUKOCYTE ESTERASE UR QL STRIP: NEGATIVE
LYMPHOCYTES # BLD AUTO: 1.31 THOUSANDS/ΜL (ref 0.6–4.47)
LYMPHOCYTES NFR BLD AUTO: 26 % (ref 14–44)
MAGNESIUM SERPL-MCNC: 2.8 MG/DL (ref 1.6–2.6)
MCH RBC QN AUTO: 32 PG (ref 26.8–34.3)
MCHC RBC AUTO-ENTMCNC: 31.7 G/DL (ref 31.4–37.4)
MCV RBC AUTO: 101 FL (ref 82–98)
MONOCYTES # BLD AUTO: 0.48 THOUSAND/ΜL (ref 0.17–1.22)
MONOCYTES NFR BLD AUTO: 10 % (ref 4–12)
NEUTROPHILS # BLD AUTO: 2.64 THOUSANDS/ΜL (ref 1.85–7.62)
NEUTS SEG NFR BLD AUTO: 53 % (ref 43–75)
NITRITE UR QL STRIP: NEGATIVE
NON-SQ EPI CELLS URNS QL MICRO: NORMAL /HPF
NRBC BLD AUTO-RTO: 0 /100 WBCS
PH UR STRIP.AUTO: 6 [PH]
PHOSPHATE SERPL-MCNC: 3.2 MG/DL (ref 2.7–4.5)
PLATELET # BLD AUTO: 243 THOUSANDS/UL (ref 149–390)
PMV BLD AUTO: 11.3 FL (ref 8.9–12.7)
POTASSIUM SERPL-SCNC: 4.3 MMOL/L (ref 3.5–5.3)
PROT SERPL-MCNC: 7.1 G/DL (ref 6.4–8.2)
PROT UR STRIP-MCNC: NEGATIVE MG/DL
PROT UR-MCNC: 16 MG/DL
PROT/CREAT UR: 0.5 MG/G{CREAT} (ref 0–0.1)
PTH-INTACT SERPL-MCNC: 114.7 PG/ML (ref 18.4–80.1)
RBC # BLD AUTO: 3.75 MILLION/UL (ref 3.81–5.12)
RBC #/AREA URNS AUTO: NORMAL /HPF
SODIUM SERPL-SCNC: 146 MMOL/L (ref 136–145)
SP GR UR STRIP.AUTO: 1.01 (ref 1–1.03)
UROBILINOGEN UR QL STRIP.AUTO: 0.2 E.U./DL
WBC # BLD AUTO: 4.97 THOUSAND/UL (ref 4.31–10.16)
WBC #/AREA URNS AUTO: NORMAL /HPF

## 2020-01-15 PROCEDURE — 83735 ASSAY OF MAGNESIUM: CPT | Performed by: INTERNAL MEDICINE

## 2020-01-15 PROCEDURE — 36415 COLL VENOUS BLD VENIPUNCTURE: CPT | Performed by: INTERNAL MEDICINE

## 2020-01-15 PROCEDURE — 83970 ASSAY OF PARATHORMONE: CPT | Performed by: INTERNAL MEDICINE

## 2020-01-15 PROCEDURE — 84156 ASSAY OF PROTEIN URINE: CPT | Performed by: INTERNAL MEDICINE

## 2020-01-15 PROCEDURE — 81001 URINALYSIS AUTO W/SCOPE: CPT | Performed by: INTERNAL MEDICINE

## 2020-01-15 PROCEDURE — 84100 ASSAY OF PHOSPHORUS: CPT | Performed by: INTERNAL MEDICINE

## 2020-01-15 PROCEDURE — 80053 COMPREHEN METABOLIC PANEL: CPT | Performed by: INTERNAL MEDICINE

## 2020-01-15 PROCEDURE — 87389 HIV-1 AG W/HIV-1&-2 AB AG IA: CPT

## 2020-01-15 PROCEDURE — 82570 ASSAY OF URINE CREATININE: CPT | Performed by: INTERNAL MEDICINE

## 2020-01-15 PROCEDURE — 85025 COMPLETE CBC W/AUTO DIFF WBC: CPT

## 2020-01-16 ENCOUNTER — TELEPHONE (OUTPATIENT)
Dept: NEPHROLOGY | Facility: CLINIC | Age: 60
End: 2020-01-16

## 2020-01-16 LAB — HIV 1+2 AB+HIV1 P24 AG SERPL QL IA: NORMAL

## 2020-01-16 NOTE — TELEPHONE ENCOUNTER
Reynaldo Lorenzo called in wanting to know if you had a chance to review her lab results from yesterday  I made her aware that you were seeing patients today but would forward a message over to you

## 2020-01-16 NOTE — TELEPHONE ENCOUNTER
----- Message from Monica Butcher DO sent at 1/16/2020  3:44 PM EST -----  Her labs were reviewed and stable, creatinine was stable at 2 0 sodium was slightly increased at 146 so if she can hydrate better that would be ideal but no other changes to medications  Thanks

## 2020-01-16 NOTE — TELEPHONE ENCOUNTER
I called and spoke with Mickie Chun  She is aware of her lab results and understands to remain hydrated   Mickie Chun will continue on her current medications and has no further issues or concerns 1/16/20

## 2020-01-20 NOTE — TELEPHONE ENCOUNTER
Pt left [facility] asking for -[phone]; asks if you heard from [address] [place] [address] Pt assistance program?

## 2020-01-23 NOTE — TELEPHONE ENCOUNTER
Pt left [facility] asking for  [phone] about assistance program

## 2020-01-23 NOTE — TELEPHONE ENCOUNTER
LVM for pt that we have not yet heard anything from the patient assistance program for her Linzess  Told her to call back with further questions

## 2020-01-30 ENCOUNTER — OFFICE VISIT (OUTPATIENT)
Dept: OBGYN CLINIC | Facility: CLINIC | Age: 60
End: 2020-01-30
Payer: MEDICARE

## 2020-01-30 VITALS
DIASTOLIC BLOOD PRESSURE: 68 MMHG | SYSTOLIC BLOOD PRESSURE: 119 MMHG | HEART RATE: 92 BPM | HEIGHT: 68 IN | BODY MASS INDEX: 29.7 KG/M2 | WEIGHT: 196 LBS

## 2020-01-30 DIAGNOSIS — M22.2X2 PATELLOFEMORAL SYNDROME OF LEFT KNEE: Primary | ICD-10-CM

## 2020-01-30 DIAGNOSIS — M22.2X1 RIGHT PATELLOFEMORAL SYNDROME: ICD-10-CM

## 2020-01-30 PROCEDURE — 99213 OFFICE O/P EST LOW 20 MIN: CPT | Performed by: ORTHOPAEDIC SURGERY

## 2020-01-30 NOTE — PROGRESS NOTES
Assessment:     1  Patellofemoral syndrome of left knee    2  Right patellofemoral syndrome        Plan:     Problem List Items Addressed This Visit        Musculoskeletal and Integument    Patellofemoral syndrome of left knee - Primary    Relevant Orders    Ambulatory referral to Physical Therapy    Right patellofemoral syndrome    Relevant Orders    Ambulatory referral to Physical Therapy          The patient was seen and examined by Dr Jaylen Gallegos and myself  Findings consistent with bilateral knee patellofemoral syndrome  Findings and treatment options were discussed with the patient  X-rays reviewed with her  Discussed importance of her attending formal physical therapy at this time for VMO strengthening and general conditioning  She is to do the exercises at home on a daily basis as well  Continue ice and NSAIDs as needed  Follow-up in 6-8 weeks for re-evaluation  All questions were answered to patient's satisfaction  Subjective:     Patient ID: Vasu Jones is a 64 y o  female  Chief Complaint: This is a 42-year-old white female complaining of bilateral knee pain for the past few months  She denies any new injury or change in activities  She was seen in our office in October 2019 for her left knee  She was diagnosed with patellofemoral syndrome and prescribed a brace and physical therapy  She did not use a brace or tended physical therapy  She was seen in our office in May 2019 for her right knee after a contusion that healed with no complications  The pain in both of her knees her aching in over the anterior aspect of her knee  She states that she has difficulty climbing stairs due to pain and weakness in her legs  She denies any locking, catching or giving away  She denies any lumbar spine pain or radicular symptoms down her lower extremities  No other treatment        Allergy:  Allergies   Allergen Reactions    Pollen Extract Nasal Congestion     Medications:  all current active meds have been reviewed  Past Medical History:  Past Medical History:   Diagnosis Date    Ankle sprain     left    Anxiety disorder     Bipolar 2 disorder (HCC)     Chronic back pain     CKD (chronic kidney disease) stage 3, GFR 30-59 ml/min (HCC)     stage 4 (as per pt)    CVA (cerebral vascular accident) ([facility] [address] )     noted on MRI in the past    Depression     GERD (gastroesophageal reflux disease)     Hypercholesteremia     Hyperlipidemia     Hypernatremia     Hypertension     Hypokalemia     Intervertebral disc disorder with radiculopathy of lumbosacral region     resolved: 2015    Kidney disease     Panic attacks     Pericardial effusion     PONV (postoperative nausea and vomiting)     Radiculitis     resolved: 2015    Secondary renal hyperparathyroidism ([facility] [address] )     Vitamin D deficiency      Past Surgical History:  Past Surgical History:   Procedure Laterality Date    BUNIONECTOMY      Left foot     COLON SURGERY      COLONOSCOPY  2018    DILATION AND CURETTAGE OF UTERUS      INDUCED       surgically induced    TN ANASTOMOSIS,AV,ANY SITE Left 2019    Procedure: CREATION FISTULA ARTERIOVENOUS (AV) left wrists possible left upper;  Surgeon: Lilly Galeazzi, MD;  [facility];  Service: Vascular    TN CORRJ HALLUX VALGUS W/SESMDC W/DIST METAR OSTEOT Left 2019    Procedure: Francis Bui;  Surgeon: Jeffy Monroe DPM;  [facility];  Service: Podiatry    TN ERCP DX COLLECTION SPECIMEN BRUSHING/WASHING N/A 2018    Procedure: ENDOSCOPIC RETROGRADE CHOLANGIOPANCREATOGRAPHY (ERCP);   Surgeon: David Sheikh MD;  [facility];  Service: Gastroenterology    TN LAP, SURG PROCTOPEXY N/A 2018    Procedure: ROBOTIC SIGMOID RESECTION / RECTOPEXY;  Surgeon: Rey Youssef MD;  [facility];  Service: Colorectal    TN SIGMOIDOSCOPY FLX DX W/COLLJ SPEC BR/WA IF PFRMD N/A 2018    Procedure: Paul Ghosh;  Surgeon: Moises Aguayo Anastasia Glass MD;  [facility];  Service: Colorectal    TUBAL LIGATION Bilateral 1997    US GUIDED THYROID BIOPSY  7/30/2019     Family History:  Family History   Problem Relation Age of Onset    Bipolar disorder Mother     Mental illness Mother         depression    Stroke Mother     Dementia Mother     Heart disease Father     Hypertension Father     Diabetes Father     Other Family         Back disorder    Diabetes Family     Heart disease Family     Hypertension Family     Breast cancer Family     Stroke Family     Thyroid disease Family     Breast cancer Paternal Grandmother     Breast cancer Paternal [de-identified]     Breast cancer Maternal Aunt     Mental illness Sister     Mental illness Sister     Heart disease Sister     No Known Problems Sister     No Known Problems Sister     Other Son         pituitary tumor    Hypertension Son     Obesity Son     No Known Problems Son     Substance Abuse Neg Hx         neg fam hx     Social History:  Social History     Substance and Sexual Activity   Alcohol Use Never    Frequency: Never    Binge frequency: Never     Social History     Substance and Sexual Activity   Drug Use Not Currently    Types: Marijuana     Social History     Tobacco Use   Smoking Status Never Smoker   Smokeless Tobacco Never Used     Review of Systems   Constitutional: Negative  HENT: Negative  Eyes: Negative  Respiratory: Negative  Cardiovascular: Negative  Gastrointestinal: Negative  Endocrine: Negative  Genitourinary: Negative  Musculoskeletal: Positive for arthralgias  Skin: Negative  Allergic/Immunologic: Negative  Neurological: Negative  Hematological: Negative  Psychiatric/Behavioral: Negative            Objective:  BP Readings from Last 1 Encounters:   01/30/20 119/68      Wt Readings from Last 1 Encounters:   01/30/20 88 9 kg (196 lb)      BMI:   Estimated body mass index is 29 8 kg/m² as calculated from the following:    Height as of this encounter: 5' 8" (1 727 m)  Weight as of this encounter: 88 9 kg (196 lb)  BSA:   Estimated body surface area is 2 03 meters squared as calculated from the following:    Height as of this encounter: 5' 8" (1 727 m)  Weight as of this encounter: 88 9 kg (196 lb)  Physical Exam   Constitutional: She is oriented to person, place, and time  She appears well-developed  HENT:   Head: Normocephalic and atraumatic  Eyes: Conjunctivae and EOM are normal    Neck: Neck supple  Musculoskeletal:        Right knee: She exhibits no effusion  Left knee: She exhibits no effusion  Neurological: She is alert and oriented to person, place, and time  Skin: Skin is warm  Psychiatric: She has a normal mood and affect  Nursing note and vitals reviewed  Right Knee Exam     Tenderness   Right knee tenderness location: Diffusely tender around joint  Range of Motion   The patient has normal right knee ROM  Tests   Kateryna:  Medial - negative Lateral - negative  Varus: negative Valgus: negative  Drawer:  Anterior - negative    Posterior - negative    Other   Erythema: absent  Scars: absent  Sensation: normal  Pulse: present  Swelling: none  Effusion: no effusion present    Comments:  3/5 quadriceps strength      Left Knee Exam     Tenderness   Left knee tenderness location: Diffusely tender around joint  Range of Motion   The patient has normal left knee ROM  Tests   Kateryna:  Medial - negative Lateral - negative  Varus: negative Valgus: negative  Drawer:  Anterior - negative     Posterior - negative    Other   Erythema: absent  Scars: absent  Sensation: normal  Pulse: present  Swelling: none  Effusion: no effusion present    Comments:  3/5 quadriceps strength            I have personally reviewed pertinent films in PACS and my interpretation is X-rays of the bilateral knees reveal no abnormalities  No soft tissue calcifications

## 2020-01-31 NOTE — TELEPHONE ENCOUNTER
Pt asks if we heard about her assistance?/waiting for med Linzess  Advised no response re: Pt assistance lorena  Pt states she is almost out/has 2 left; asked if we had samples? Conf'd 290 mcg  Leaving 3 boxes 290 mcg samples at /she will  today  I recommended she contact them/was going to provide ph# when she said she spoke w/ someone last wk and they told her it was approved  Pt will f/u w/ them again

## 2020-02-03 NOTE — TELEPHONE ENCOUNTER
Pt left [facility] stating she was told med was shipped and you rec'd it on 1/31; asks for  [phone]

## 2020-02-04 NOTE — TELEPHONE ENCOUNTER
Patient contacted that medication delivered and she will   Patient should have received 90 day supply  One of the bottles of 30 must have been given out as sample so we replaced equivalent amount with our samples on hand

## 2020-02-05 ENCOUNTER — TELEPHONE (OUTPATIENT)
Dept: FAMILY MEDICINE CLINIC | Facility: HOSPITAL | Age: 60
End: 2020-02-05

## 2020-02-05 DIAGNOSIS — N18.6 END STAGE RENAL DISEASE (HCC): ICD-10-CM

## 2020-02-05 DIAGNOSIS — R11.2 NAUSEA AND VOMITING, INTRACTABILITY OF VOMITING NOT SPECIFIED, UNSPECIFIED VOMITING TYPE: Primary | ICD-10-CM

## 2020-02-05 RX ORDER — ONDANSETRON 4 MG/1
4 TABLET, FILM COATED ORAL EVERY 8 HOURS PRN
Qty: 12 TABLET | Refills: 0 | Status: SHIPPED | OUTPATIENT
Start: 2020-02-05 | End: 2020-02-20 | Stop reason: SDUPTHER

## 2020-02-05 NOTE — TELEPHONE ENCOUNTER
Remus  for pt to call us back  I need to know what pharmacy before sending to the doctor  Pt didn't tell Marybel rivers

## 2020-02-05 NOTE — TELEPHONE ENCOUNTER
I spoke with pt she started with nausea 2 days ago and started vomiting last night she is unable to keep any food down she had a few sips of juice  Has HA , no fever  She is asking for Ondansetron to be sent to her pharmacy   DD

## 2020-02-05 NOTE — TELEPHONE ENCOUNTER
I have sent in rx for zofran and also ordered a bmp which she should do in am- any worsening of sx to go to er to avoid dehydration

## 2020-02-06 ENCOUNTER — APPOINTMENT (EMERGENCY)
Dept: CT IMAGING | Facility: HOSPITAL | Age: 60
DRG: 392 | End: 2020-02-06
Payer: MEDICARE

## 2020-02-06 ENCOUNTER — HOSPITAL ENCOUNTER (INPATIENT)
Facility: HOSPITAL | Age: 60
LOS: 2 days | Discharge: HOME | DRG: 392 | End: 2020-02-09
Attending: EMERGENCY MEDICINE | Admitting: FAMILY MEDICINE
Payer: MEDICARE

## 2020-02-06 DIAGNOSIS — N17.9 ACUTE KIDNEY INJURY (HCC): Primary | ICD-10-CM

## 2020-02-06 DIAGNOSIS — E87.6 HYPOKALEMIA: ICD-10-CM

## 2020-02-06 DIAGNOSIS — R11.10 VOMITING: ICD-10-CM

## 2020-02-06 PROBLEM — N18.4 CKD (CHRONIC KIDNEY DISEASE) STAGE 4, GFR 15-29 ML/MIN (HCC): Status: ACTIVE | Noted: 2020-02-06

## 2020-02-06 LAB
ALBUMIN SERPL BCP-MCNC: 4 G/DL (ref 3.5–5)
ALP SERPL-CCNC: 273 U/L (ref 46–116)
ALT SERPL W P-5'-P-CCNC: 23 U/L (ref 12–78)
ANION GAP SERPL CALCULATED.3IONS-SCNC: 12 MMOL/L (ref 4–13)
ANION GAP SERPL CALCULATED.3IONS-SCNC: 14 MMOL/L (ref 4–13)
ANION GAP SERPL CALCULATED.3IONS-SCNC: 16 MMOL/L (ref 4–13)
AST SERPL W P-5'-P-CCNC: 20 U/L (ref 5–45)
ATRIAL RATE: 97 BPM
BACTERIA UR QL AUTO: ABNORMAL /HPF
BASOPHILS # BLD AUTO: 0.13 THOUSANDS/ΜL (ref 0–0.1)
BASOPHILS NFR BLD AUTO: 2 % (ref 0–1)
BILIRUB SERPL-MCNC: 0.3 MG/DL (ref 0.2–1)
BILIRUB UR QL STRIP: NEGATIVE
BUN SERPL-MCNC: 25 MG/DL (ref 5–25)
BUN SERPL-MCNC: 26 MG/DL (ref 5–25)
BUN SERPL-MCNC: 29 MG/DL (ref 5–25)
CALCIUM SERPL-MCNC: 7.5 MG/DL (ref 8.3–10.1)
CALCIUM SERPL-MCNC: 9.1 MG/DL (ref 8.3–10.1)
CALCIUM SERPL-MCNC: 9.5 MG/DL (ref 8.3–10.1)
CHLORIDE SERPL-SCNC: 107 MMOL/L (ref 100–108)
CHLORIDE SERPL-SCNC: 112 MMOL/L (ref 100–108)
CHLORIDE SERPL-SCNC: 114 MMOL/L (ref 100–108)
CLARITY UR: CLEAR
CO2 SERPL-SCNC: 16 MMOL/L (ref 21–32)
CO2 SERPL-SCNC: 17 MMOL/L (ref 21–32)
CO2 SERPL-SCNC: 18 MMOL/L (ref 21–32)
COLOR UR: YELLOW
CREAT SERPL-MCNC: 1.94 MG/DL (ref 0.6–1.3)
CREAT SERPL-MCNC: 2.38 MG/DL (ref 0.6–1.3)
CREAT SERPL-MCNC: 2.71 MG/DL (ref 0.6–1.3)
EOSINOPHIL # BLD AUTO: 0.67 THOUSAND/ΜL (ref 0–0.61)
EOSINOPHIL NFR BLD AUTO: 11 % (ref 0–6)
ERYTHROCYTE [DISTWIDTH] IN BLOOD BY AUTOMATED COUNT: 12.6 % (ref 11.6–15.1)
GFR SERPL CREATININE-BSD FRML MDRD: 19 ML/MIN/1.73SQ M
GFR SERPL CREATININE-BSD FRML MDRD: 22 ML/MIN/1.73SQ M
GFR SERPL CREATININE-BSD FRML MDRD: 28 ML/MIN/1.73SQ M
GLUCOSE SERPL-MCNC: 108 MG/DL (ref 65–140)
GLUCOSE SERPL-MCNC: 119 MG/DL (ref 65–140)
GLUCOSE SERPL-MCNC: 86 MG/DL (ref 65–140)
GLUCOSE UR STRIP-MCNC: NEGATIVE MG/DL
HCT VFR BLD AUTO: 43.7 % (ref 34.8–46.1)
HGB BLD-MCNC: 14.1 G/DL (ref 11.5–15.4)
HGB UR QL STRIP.AUTO: NEGATIVE
IMM GRANULOCYTES # BLD AUTO: 0.02 THOUSAND/UL (ref 0–0.2)
IMM GRANULOCYTES NFR BLD AUTO: 0 % (ref 0–2)
KETONES UR STRIP-MCNC: NEGATIVE MG/DL
LEUKOCYTE ESTERASE UR QL STRIP: ABNORMAL
LYMPHOCYTES # BLD AUTO: 1.21 THOUSANDS/ΜL (ref 0.6–4.47)
LYMPHOCYTES NFR BLD AUTO: 21 % (ref 14–44)
MAGNESIUM SERPL-MCNC: 2.1 MG/DL (ref 1.6–2.6)
MCH RBC QN AUTO: 31.8 PG (ref 26.8–34.3)
MCHC RBC AUTO-ENTMCNC: 32.3 G/DL (ref 31.4–37.4)
MCV RBC AUTO: 99 FL (ref 82–98)
MONOCYTES # BLD AUTO: 0.42 THOUSAND/ΜL (ref 0.17–1.22)
MONOCYTES NFR BLD AUTO: 7 % (ref 4–12)
NEUTROPHILS # BLD AUTO: 3.44 THOUSANDS/ΜL (ref 1.85–7.62)
NEUTS SEG NFR BLD AUTO: 59 % (ref 43–75)
NITRITE UR QL STRIP: NEGATIVE
NON-SQ EPI CELLS URNS QL MICRO: ABNORMAL /HPF
NRBC BLD AUTO-RTO: 0 /100 WBCS
P AXIS: 52 DEGREES
PH UR STRIP.AUTO: 6.5 [PH]
PHOSPHATE SERPL-MCNC: 3.8 MG/DL (ref 2.7–4.5)
PLATELET # BLD AUTO: 217 THOUSANDS/UL (ref 149–390)
PLATELET # BLD AUTO: 258 THOUSANDS/UL (ref 149–390)
PMV BLD AUTO: 10.7 FL (ref 8.9–12.7)
PMV BLD AUTO: 11 FL (ref 8.9–12.7)
POTASSIUM SERPL-SCNC: 2.7 MMOL/L (ref 3.5–5.3)
POTASSIUM SERPL-SCNC: 3.1 MMOL/L (ref 3.5–5.3)
POTASSIUM SERPL-SCNC: 3.8 MMOL/L (ref 3.5–5.3)
PR INTERVAL: 142 MS
PROT SERPL-MCNC: 8.1 G/DL (ref 6.4–8.2)
PROT UR STRIP-MCNC: NEGATIVE MG/DL
QRS AXIS: 167 DEGREES
QRSD INTERVAL: 108 MS
QT INTERVAL: 410 MS
QTC INTERVAL: 520 MS
RBC # BLD AUTO: 4.43 MILLION/UL (ref 3.81–5.12)
RBC #/AREA URNS AUTO: ABNORMAL /HPF
SODIUM SERPL-SCNC: 139 MMOL/L (ref 136–145)
SODIUM SERPL-SCNC: 143 MMOL/L (ref 136–145)
SODIUM SERPL-SCNC: 144 MMOL/L (ref 136–145)
SP GR UR STRIP.AUTO: <=1.005 (ref 1–1.03)
T WAVE AXIS: 42 DEGREES
UROBILINOGEN UR QL STRIP.AUTO: 0.2 E.U./DL
VENTRICULAR RATE: 97 BPM
WBC # BLD AUTO: 5.89 THOUSAND/UL (ref 4.31–10.16)
WBC #/AREA URNS AUTO: ABNORMAL /HPF

## 2020-02-06 PROCEDURE — 85025 COMPLETE CBC W/AUTO DIFF WBC: CPT | Performed by: PHYSICIAN ASSISTANT

## 2020-02-06 PROCEDURE — 99218 PR INITIAL OBSERVATION CARE/DAY 30 MINUTES: CPT | Performed by: FAMILY MEDICINE

## 2020-02-06 PROCEDURE — 87086 URINE CULTURE/COLONY COUNT: CPT | Performed by: PHYSICIAN ASSISTANT

## 2020-02-06 PROCEDURE — 80048 BASIC METABOLIC PNL TOTAL CA: CPT | Performed by: PHYSICIAN ASSISTANT

## 2020-02-06 PROCEDURE — 80048 BASIC METABOLIC PNL TOTAL CA: CPT | Performed by: FAMILY MEDICINE

## 2020-02-06 PROCEDURE — 96375 TX/PRO/DX INJ NEW DRUG ADDON: CPT

## 2020-02-06 PROCEDURE — 99285 EMERGENCY DEPT VISIT HI MDM: CPT

## 2020-02-06 PROCEDURE — 99285 EMERGENCY DEPT VISIT HI MDM: CPT | Performed by: PHYSICIAN ASSISTANT

## 2020-02-06 PROCEDURE — 84100 ASSAY OF PHOSPHORUS: CPT | Performed by: PHYSICIAN ASSISTANT

## 2020-02-06 PROCEDURE — 36415 COLL VENOUS BLD VENIPUNCTURE: CPT | Performed by: PHYSICIAN ASSISTANT

## 2020-02-06 PROCEDURE — 96361 HYDRATE IV INFUSION ADD-ON: CPT

## 2020-02-06 PROCEDURE — 83735 ASSAY OF MAGNESIUM: CPT | Performed by: PHYSICIAN ASSISTANT

## 2020-02-06 PROCEDURE — 74176 CT ABD & PELVIS W/O CONTRAST: CPT

## 2020-02-06 PROCEDURE — 96365 THER/PROPH/DIAG IV INF INIT: CPT

## 2020-02-06 PROCEDURE — 85049 AUTOMATED PLATELET COUNT: CPT | Performed by: FAMILY MEDICINE

## 2020-02-06 PROCEDURE — 80053 COMPREHEN METABOLIC PANEL: CPT | Performed by: PHYSICIAN ASSISTANT

## 2020-02-06 PROCEDURE — 81001 URINALYSIS AUTO W/SCOPE: CPT | Performed by: PHYSICIAN ASSISTANT

## 2020-02-06 PROCEDURE — 93010 ELECTROCARDIOGRAM REPORT: CPT | Performed by: INTERNAL MEDICINE

## 2020-02-06 PROCEDURE — 93005 ELECTROCARDIOGRAM TRACING: CPT

## 2020-02-06 PROCEDURE — 96366 THER/PROPH/DIAG IV INF ADDON: CPT

## 2020-02-06 RX ORDER — ONDANSETRON 2 MG/ML
4 INJECTION INTRAMUSCULAR; INTRAVENOUS ONCE
Status: COMPLETED | OUTPATIENT
Start: 2020-02-06 | End: 2020-02-06

## 2020-02-06 RX ORDER — FLUOCINONIDE 0.5 MG/G
OINTMENT TOPICAL 2 TIMES DAILY
Status: DISCONTINUED | OUTPATIENT
Start: 2020-02-06 | End: 2020-02-09 | Stop reason: HOSPADM

## 2020-02-06 RX ORDER — POTASSIUM CHLORIDE 14.9 MG/ML
20 INJECTION INTRAVENOUS ONCE
Status: COMPLETED | OUTPATIENT
Start: 2020-02-06 | End: 2020-02-06

## 2020-02-06 RX ORDER — POTASSIUM CHLORIDE 20 MEQ/1
20 TABLET, EXTENDED RELEASE ORAL ONCE
Status: COMPLETED | OUTPATIENT
Start: 2020-02-06 | End: 2020-02-06

## 2020-02-06 RX ORDER — PANTOPRAZOLE SODIUM 40 MG/1
40 TABLET, DELAYED RELEASE ORAL
Status: DISCONTINUED | OUTPATIENT
Start: 2020-02-07 | End: 2020-02-09 | Stop reason: HOSPADM

## 2020-02-06 RX ORDER — POTASSIUM CHLORIDE 14.9 MG/ML
20 INJECTION INTRAVENOUS
Status: COMPLETED | OUTPATIENT
Start: 2020-02-06 | End: 2020-02-06

## 2020-02-06 RX ORDER — LAMOTRIGINE 100 MG/1
400 TABLET ORAL
Status: DISCONTINUED | OUTPATIENT
Start: 2020-02-06 | End: 2020-02-09 | Stop reason: HOSPADM

## 2020-02-06 RX ORDER — LAMOTRIGINE 200 MG/1
TABLET ORAL
Status: ON HOLD | COMMUNITY
Start: 2020-01-23 | End: 2020-02-06

## 2020-02-06 RX ORDER — CARVEDILOL 3.12 MG/1
3.12 TABLET ORAL 2 TIMES DAILY WITH MEALS
Status: DISCONTINUED | OUTPATIENT
Start: 2020-02-06 | End: 2020-02-09 | Stop reason: HOSPADM

## 2020-02-06 RX ORDER — FLUVOXAMINE MALEATE 100 MG
100 TABLET ORAL 3 TIMES DAILY
Status: DISCONTINUED | OUTPATIENT
Start: 2020-02-06 | End: 2020-02-09 | Stop reason: HOSPADM

## 2020-02-06 RX ORDER — DICYCLOMINE HCL 20 MG
20 TABLET ORAL ONCE
Status: COMPLETED | OUTPATIENT
Start: 2020-02-06 | End: 2020-02-06

## 2020-02-06 RX ORDER — SODIUM CHLORIDE 9 MG/ML
125 INJECTION, SOLUTION INTRAVENOUS CONTINUOUS
Status: DISPENSED | OUTPATIENT
Start: 2020-02-06 | End: 2020-02-06

## 2020-02-06 RX ORDER — ACETAMINOPHEN 325 MG/1
650 TABLET ORAL EVERY 6 HOURS PRN
Status: DISCONTINUED | OUTPATIENT
Start: 2020-02-06 | End: 2020-02-09 | Stop reason: HOSPADM

## 2020-02-06 RX ORDER — ASPIRIN 81 MG/1
81 TABLET ORAL DAILY
Status: DISCONTINUED | OUTPATIENT
Start: 2020-02-07 | End: 2020-02-09 | Stop reason: HOSPADM

## 2020-02-06 RX ORDER — CLONAZEPAM 0.5 MG/1
0.5 TABLET ORAL 3 TIMES DAILY
Status: DISCONTINUED | OUTPATIENT
Start: 2020-02-06 | End: 2020-02-09 | Stop reason: HOSPADM

## 2020-02-06 RX ORDER — HEPARIN SODIUM 5000 [USP'U]/ML
5000 INJECTION, SOLUTION INTRAVENOUS; SUBCUTANEOUS EVERY 8 HOURS SCHEDULED
Status: DISCONTINUED | OUTPATIENT
Start: 2020-02-06 | End: 2020-02-09 | Stop reason: HOSPADM

## 2020-02-06 RX ORDER — POTASSIUM CHLORIDE 29.8 MG/ML
40 INJECTION INTRAVENOUS ONCE
Status: DISCONTINUED | OUTPATIENT
Start: 2020-02-06 | End: 2020-02-06 | Stop reason: DRUGHIGH

## 2020-02-06 RX ORDER — AMILORIDE HYDROCHLORIDE 5 MG/1
5 TABLET ORAL 2 TIMES DAILY
Status: CANCELLED | OUTPATIENT
Start: 2020-02-06

## 2020-02-06 RX ORDER — SODIUM CHLORIDE, SODIUM GLUCONATE, SODIUM ACETATE, POTASSIUM CHLORIDE, MAGNESIUM CHLORIDE, SODIUM PHOSPHATE, DIBASIC, AND POTASSIUM PHOSPHATE .53; .5; .37; .037; .03; .012; .00082 G/100ML; G/100ML; G/100ML; G/100ML; G/100ML; G/100ML; G/100ML
1000 INJECTION, SOLUTION INTRAVENOUS ONCE
Status: COMPLETED | OUTPATIENT
Start: 2020-02-06 | End: 2020-02-06

## 2020-02-06 RX ORDER — QUETIAPINE FUMARATE 300 MG/1
300 TABLET, FILM COATED ORAL EVERY MORNING
Status: DISCONTINUED | OUTPATIENT
Start: 2020-02-07 | End: 2020-02-09 | Stop reason: HOSPADM

## 2020-02-06 RX ORDER — ATORVASTATIN CALCIUM 40 MG/1
40 TABLET, FILM COATED ORAL DAILY
Status: DISCONTINUED | OUTPATIENT
Start: 2020-02-07 | End: 2020-02-09 | Stop reason: HOSPADM

## 2020-02-06 RX ADMIN — HEPARIN SODIUM 5000 UNITS: 5000 INJECTION INTRAVENOUS; SUBCUTANEOUS at 18:22

## 2020-02-06 RX ADMIN — ACETAMINOPHEN 650 MG: 325 TABLET ORAL at 18:22

## 2020-02-06 RX ADMIN — ONDANSETRON 4 MG: 2 INJECTION INTRAMUSCULAR; INTRAVENOUS at 10:57

## 2020-02-06 RX ADMIN — FLUOCINONIDE: 0.5 OINTMENT TOPICAL at 19:05

## 2020-02-06 RX ADMIN — CARVEDILOL 3.12 MG: 3.12 TABLET, FILM COATED ORAL at 18:22

## 2020-02-06 RX ADMIN — DICYCLOMINE HYDROCHLORIDE 20 MG: 20 TABLET ORAL at 12:58

## 2020-02-06 RX ADMIN — SODIUM CHLORIDE, SODIUM GLUCONATE, SODIUM ACETATE, POTASSIUM CHLORIDE, MAGNESIUM CHLORIDE, SODIUM PHOSPHATE, DIBASIC, AND POTASSIUM PHOSPHATE 1000 ML: .53; .5; .37; .037; .03; .012; .00082 INJECTION, SOLUTION INTRAVENOUS at 12:29

## 2020-02-06 RX ADMIN — SODIUM CHLORIDE 125 ML/HR: 0.9 INJECTION, SOLUTION INTRAVENOUS at 15:29

## 2020-02-06 RX ADMIN — HEPARIN SODIUM 5000 UNITS: 5000 INJECTION INTRAVENOUS; SUBCUTANEOUS at 23:38

## 2020-02-06 RX ADMIN — LAMOTRIGINE 400 MG: 100 TABLET ORAL at 21:05

## 2020-02-06 RX ADMIN — POTASSIUM CHLORIDE 20 MEQ: 1500 TABLET, EXTENDED RELEASE ORAL at 12:28

## 2020-02-06 RX ADMIN — CLONAZEPAM 0.5 MG: 0.5 TABLET ORAL at 21:04

## 2020-02-06 RX ADMIN — POTASSIUM CHLORIDE 20 MEQ: 14.9 INJECTION, SOLUTION INTRAVENOUS at 15:29

## 2020-02-06 RX ADMIN — SODIUM CHLORIDE 500 ML: 0.9 INJECTION, SOLUTION INTRAVENOUS at 10:57

## 2020-02-06 RX ADMIN — POTASSIUM CHLORIDE 20 MEQ: 14.9 INJECTION, SOLUTION INTRAVENOUS at 20:39

## 2020-02-06 RX ADMIN — POTASSIUM CHLORIDE 20 MEQ: 14.9 INJECTION, SOLUTION INTRAVENOUS at 18:35

## 2020-02-06 RX ADMIN — FLUVOXAMINE MALEATE 100 MG: 100 TABLET ORAL at 21:04

## 2020-02-07 PROBLEM — K52.9 GASTROENTERITIS: Status: ACTIVE | Noted: 2020-02-06

## 2020-02-07 LAB
ANION GAP SERPL CALCULATED.3IONS-SCNC: 12 MMOL/L (ref 4–13)
ANION GAP SERPL CALCULATED.3IONS-SCNC: 14 MMOL/L (ref 4–13)
BACTERIA UR CULT: NORMAL
BUN SERPL-MCNC: 20 MG/DL (ref 5–25)
BUN SERPL-MCNC: 22 MG/DL (ref 5–25)
CALCIUM SERPL-MCNC: 9.3 MG/DL (ref 8.3–10.1)
CALCIUM SERPL-MCNC: 9.4 MG/DL (ref 8.3–10.1)
CHLORIDE SERPL-SCNC: 111 MMOL/L (ref 100–108)
CHLORIDE SERPL-SCNC: 115 MMOL/L (ref 100–108)
CO2 SERPL-SCNC: 18 MMOL/L (ref 21–32)
CO2 SERPL-SCNC: 19 MMOL/L (ref 21–32)
CREAT SERPL-MCNC: 2.14 MG/DL (ref 0.6–1.3)
CREAT SERPL-MCNC: 2.25 MG/DL (ref 0.6–1.3)
ERYTHROCYTE [DISTWIDTH] IN BLOOD BY AUTOMATED COUNT: 12.9 % (ref 11.6–15.1)
GFR SERPL CREATININE-BSD FRML MDRD: 24 ML/MIN/1.73SQ M
GFR SERPL CREATININE-BSD FRML MDRD: 25 ML/MIN/1.73SQ M
GLUCOSE P FAST SERPL-MCNC: 104 MG/DL (ref 65–99)
GLUCOSE SERPL-MCNC: 104 MG/DL (ref 65–140)
GLUCOSE SERPL-MCNC: 87 MG/DL (ref 65–140)
HCT VFR BLD AUTO: 37.6 % (ref 34.8–46.1)
HGB BLD-MCNC: 12.2 G/DL (ref 11.5–15.4)
MAGNESIUM SERPL-MCNC: 2.4 MG/DL (ref 1.6–2.6)
MCH RBC QN AUTO: 32.3 PG (ref 26.8–34.3)
MCHC RBC AUTO-ENTMCNC: 32.4 G/DL (ref 31.4–37.4)
MCV RBC AUTO: 100 FL (ref 82–98)
PHOSPHATE SERPL-MCNC: 2.9 MG/DL (ref 2.7–4.5)
PLATELET # BLD AUTO: 214 THOUSANDS/UL (ref 149–390)
PMV BLD AUTO: 11 FL (ref 8.9–12.7)
POTASSIUM SERPL-SCNC: 3.6 MMOL/L (ref 3.5–5.3)
POTASSIUM SERPL-SCNC: 3.7 MMOL/L (ref 3.5–5.3)
RBC # BLD AUTO: 3.78 MILLION/UL (ref 3.81–5.12)
SODIUM SERPL-SCNC: 144 MMOL/L (ref 136–145)
SODIUM SERPL-SCNC: 145 MMOL/L (ref 136–145)
WBC # BLD AUTO: 5.08 THOUSAND/UL (ref 4.31–10.16)

## 2020-02-07 PROCEDURE — 90935 HEMODIALYSIS ONE EVALUATION: CPT | Performed by: INTERNAL MEDICINE

## 2020-02-07 PROCEDURE — 84100 ASSAY OF PHOSPHORUS: CPT | Performed by: FAMILY MEDICINE

## 2020-02-07 PROCEDURE — 99231 SBSQ HOSP IP/OBS SF/LOW 25: CPT | Performed by: FAMILY MEDICINE

## 2020-02-07 PROCEDURE — 99223 1ST HOSP IP/OBS HIGH 75: CPT | Performed by: INTERNAL MEDICINE

## 2020-02-07 PROCEDURE — 83735 ASSAY OF MAGNESIUM: CPT | Performed by: FAMILY MEDICINE

## 2020-02-07 PROCEDURE — 85027 COMPLETE CBC AUTOMATED: CPT | Performed by: FAMILY MEDICINE

## 2020-02-07 PROCEDURE — 80048 BASIC METABOLIC PNL TOTAL CA: CPT | Performed by: FAMILY MEDICINE

## 2020-02-07 RX ORDER — ONDANSETRON 2 MG/ML
4 INJECTION INTRAMUSCULAR; INTRAVENOUS EVERY 4 HOURS PRN
Status: DISCONTINUED | OUTPATIENT
Start: 2020-02-07 | End: 2020-02-09 | Stop reason: HOSPADM

## 2020-02-07 RX ORDER — TRAMADOL HYDROCHLORIDE 50 MG/1
50 TABLET ORAL 2 TIMES DAILY
Status: DISCONTINUED | OUTPATIENT
Start: 2020-02-07 | End: 2020-02-09 | Stop reason: HOSPADM

## 2020-02-07 RX ADMIN — HEPARIN SODIUM 5000 UNITS: 5000 INJECTION INTRAVENOUS; SUBCUTANEOUS at 06:41

## 2020-02-07 RX ADMIN — SODIUM CHLORIDE, SODIUM LACTATE, POTASSIUM CHLORIDE, AND CALCIUM CHLORIDE 500 ML: .6; .31; .03; .02 INJECTION, SOLUTION INTRAVENOUS at 17:58

## 2020-02-07 RX ADMIN — HEPARIN SODIUM 5000 UNITS: 5000 INJECTION INTRAVENOUS; SUBCUTANEOUS at 21:21

## 2020-02-07 RX ADMIN — CLONAZEPAM 0.5 MG: 0.5 TABLET ORAL at 08:35

## 2020-02-07 RX ADMIN — HEPARIN SODIUM 5000 UNITS: 5000 INJECTION INTRAVENOUS; SUBCUTANEOUS at 15:14

## 2020-02-07 RX ADMIN — FLUOCINONIDE: 0.5 OINTMENT TOPICAL at 08:35

## 2020-02-07 RX ADMIN — FLUOCINONIDE: 0.5 OINTMENT TOPICAL at 18:10

## 2020-02-07 RX ADMIN — QUETIAPINE FUMARATE 300 MG: 300 TABLET ORAL at 08:35

## 2020-02-07 RX ADMIN — FLUVOXAMINE MALEATE 100 MG: 100 TABLET ORAL at 17:54

## 2020-02-07 RX ADMIN — ATORVASTATIN CALCIUM 40 MG: 40 TABLET, FILM COATED ORAL at 08:34

## 2020-02-07 RX ADMIN — CLONAZEPAM 0.5 MG: 0.5 TABLET ORAL at 21:17

## 2020-02-07 RX ADMIN — FLUVOXAMINE MALEATE 100 MG: 100 TABLET ORAL at 09:06

## 2020-02-07 RX ADMIN — CARVEDILOL 3.12 MG: 3.12 TABLET, FILM COATED ORAL at 17:55

## 2020-02-07 RX ADMIN — LAMOTRIGINE 400 MG: 100 TABLET ORAL at 21:17

## 2020-02-07 RX ADMIN — ASPIRIN 81 MG: 81 TABLET, COATED ORAL at 08:34

## 2020-02-07 RX ADMIN — CARVEDILOL 3.12 MG: 3.12 TABLET, FILM COATED ORAL at 08:34

## 2020-02-07 RX ADMIN — CLONAZEPAM 0.5 MG: 0.5 TABLET ORAL at 17:55

## 2020-02-07 RX ADMIN — TRAMADOL HYDROCHLORIDE 50 MG: 50 TABLET, FILM COATED ORAL at 17:56

## 2020-02-07 RX ADMIN — FLUVOXAMINE MALEATE 100 MG: 100 TABLET ORAL at 21:19

## 2020-02-07 RX ADMIN — PANTOPRAZOLE SODIUM 40 MG: 40 TABLET, DELAYED RELEASE ORAL at 06:41

## 2020-02-07 NOTE — ASSESSMENT & PLAN NOTE
Pt with K+ 2 7 in setting of CKD IV on admission    Most likely secondary to GI illness   -Will replete with 40meq KCL IVPB  -Repeat BMP at [facility]

## 2020-02-07 NOTE — ASSESSMENT & PLAN NOTE
Pt with hx of CKD stage IV  Most likely secondary to lithium use in her 20's  Pt follows with neprhology, Dr Anabel De Leon  Last visit 12/26/19  Pt's Cr fluctuates and is anywhere between 1 9 to 2 4, estimated GFR between 22 and 28 mL/minute  Pt's cr 2 7 on presentation to ED this AM   Improved to 1 9 with hydration  Pt currently makes urine  Has not required HD  However, pt has AV fistula in place in LUE  Nephrology consult placed

## 2020-02-07 NOTE — UTILIZATION REVIEW
Initial Clinical Review    2/6/2020 observation and changed 2/7/2020 1532 inpatient re:  Patient needs > 2 midnight stay due to continued treatment and evaluation of persistent CAM  Start   Ordered   02/07/20 1533  Inpatient Admission Once     Transfer Service: Hospitalist       Question Answer Comment   Admitting Physician ASAD Braswell    Level of Care Med Surg    Estimated length of stay More than 2 Midnights    Certification I certify that inpatient services are medically necessary for this patient for a duration of greater than two midnights  See H&P and MD Progress Notes for additional information about the patient's course of treatment  02/07/20 1532         ED Arrival Information     Expected Arrival Acuity Means of Arrival Escorted By Service Admission Type    - 2/6/2020 10:36 Urgent Walk-In Self General Medicine Urgent    Arrival Complaint    weakness, vomiting        Chief Complaint   Patient presents with    Weakness - Generalized     To ED with c/o weakness after 3 days of nausea and vomiting and diarrhea  Denies any fever or chills  States that she is worried about her kidneys  Assessment/Plan: 64 y o  female who presents with 2 day hx of nausea, non-bloody, nonbilious vomiting  Pt stated that she had been unable to keep anything down and was beginning to feel weak, tired  Hypokalemia  Assessment & Plan  Pt with K+ 2 7 in setting of CKD IV on admission  Most likely secondary to GI illness   -Will replete with 40meq KCL IVPB  -Repeat BMP at [facility] [facility]   Pt with 2 day hx of nausea/vomiting  Resolved prior to admission  Denies abd pain  Pt has appetite  Would like to eat  -Recommending BRAT diet, but allowed pt to order what she would like  -Will monitor      CKD (chronic kidney disease) stage 4, GFR 15-29 ml/min ([facility])  Assessment & Plan  Pt with hx of CKD stage IV  Most likely secondary to lithium use in her 20's    Pt follows with neprhology, Dr Verónica Burleson  Last visit 12/26/19  Pt's Cr fluctuates and is anywhere between 1 9 to 2 4, estimated GFR between 22 and 28 mL/minute      Pt's cr 2 7 on presentation to ED this AM   Improved to 1 9 with hydration       Pt currently makes urine  Has not required HD  However, pt has AV fistula in place in LUE      Nephrology consult placed      Bipolar 1 disorder, depressed, severe ([address] )  Assessment & Plan  Pt with longstanding hx of bipolar disorder  Has been on medications since she was a child  Seeing Dr Aleks Wick and tyree for therapy at [facility] meds- which I reviewed at length with patient      ED Triage Vitals   Temperature Pulse Respirations Blood Pressure SpO2   02/06/20 1047 02/06/20 1045 02/06/20 1045 02/06/20 1047 02/06/20 1045   97 8 °F (36 6 °C) (!) 113 20 127/63 100 %      Temp Source Heart Rate Source Patient Position - Orthostatic VS BP Location FiO2 (%)   02/06/20 1047 02/06/20 1045 02/06/20 1045 02/06/20 1045 --   Tympanic Monitor Sitting Right arm       Pain Score       02/06/20 1045       No Pain        Wt Readings from Last 1 Encounters:   02/07/20 85 5 kg (188 lb 9 6 oz)     Additional Vital Signs:   02/07/20 07:47:47  97 8 °F (36 6 °C)  84  17  134/72  93  97 %  None (Room air)  Lying   02/07/20 00:02:39  97 8 °F (36 6 °C)  70  17  126/68  87  94 %       02/06/20 16:38:52  97 8 °F (36 6 °C)  79  15  153/83  106  95 %           Pertinent Labs/Diagnostic Test Results:   2/6/2020 CT abdomen - Mild air fluid distention of the right hemicolon to the level of the splenic flexure without discrete obstructing abnormality, wall thickening or inflammation evident  Grossly stable partially exophytic nodule left lower pole    Results from last 7 days   Lab Units 02/07/20  0552 02/06/20  1734 02/06/20  1056   WBC Thousand/uL 5 08  --  5 89   HEMOGLOBIN g/dL 12 2  --  14 1   HEMATOCRIT % 37 6  --  43 7   PLATELETS Thousands/uL 214 217 258   NEUTROS ABS Thousands/µL --   --  3 44         Results from last 7 days   Lab Units 02/07/20  0552 02/06/20 2034 02/06/20  1428 02/06/20  1055   SODIUM mmol/L 145 144 143 139   POTASSIUM mmol/L 3 6 3 8 2 7* 3 1*   CHLORIDE mmol/L 115* 114* 112* 107   CO2 mmol/L 18* 18* 17* 16*   ANION GAP mmol/L 12 12 14* 16*   BUN mg/dL 22 26* 25 29*   CREATININE mg/dL 2 25* 2 38* 1 94* 2 71*   EGFR ml/min/1 73sq m 24 22 28 19   CALCIUM mg/dL 9 3 9 1 7 5* 9 5   MAGNESIUM mg/dL 2 4  --   --  2 1   PHOSPHORUS mg/dL 2 9  --   --  3 8     Results from last 7 days   Lab Units 02/06/20  1055   AST U/L 20   ALT U/L 23   ALK PHOS U/L 273*   TOTAL PROTEIN g/dL 8 1   ALBUMIN g/dL 4 0   TOTAL BILIRUBIN mg/dL 0 30     Results from last 7 days   Lab Units 02/07/20  0552 02/06/20 2034 02/06/20  1428 02/06/20  1055   GLUCOSE RANDOM mg/dL 104 108 86 119       Results from last 7 days   Lab Units 02/06/20  1529   CLARITY UA  Clear   COLOR UA  Yellow   SPEC GRAV UA  <=1 005   PH UA  6 5   GLUCOSE UA mg/dl Negative   KETONES UA mg/dl Negative   BLOOD UA  Negative   PROTEIN UA mg/dl Negative   NITRITE UA  Negative   BILIRUBIN UA  Negative   UROBILINOGEN UA E U /dl 0 2   LEUKOCYTES UA  Large*   WBC UA /hpf 10-20*   RBC UA /hpf None Seen   BACTERIA UA /hpf Occasional   EPITHELIAL CELLS WET PREP /hpf Occasional     ED Treatment:   Medication Administration from 02/06/2020 1035 to 02/06/2020 1632       Date/Time Order Dose Route Action Comments     02/06/2020 1057 sodium chloride 0 9 % bolus 500 mL 500 mL Intravenous New Bag      02/06/2020 1057 ondansetron (ZOFRAN) injection 4 mg 4 mg Intravenous Given      02/06/2020 1229 multi-electrolyte (ISOLYTE-S PH 7 4) bolus 1,000 mL 1,000 mL Intravenous New Bag      02/06/2020 1228 potassium chloride (K-DUR,KLOR-CON) CR tablet 20 mEq 20 mEq Oral Given      02/06/2020 1258 dicyclomine (BENTYL) tablet 20 mg 20 mg Oral Given      02/06/2020 1529 potassium chloride 20 mEq IVPB (premix) 20 mEq Intravenous New Bag      02/06/2020 1529 sodium chloride 0 9 % infusion 125 mL/hr Intravenous New Bag         Past Medical History:   Diagnosis Date    Ankle sprain     left    Anxiety disorder     [facility] 2 disorder ([facility])     Chronic back pain     CKD (chronic kidney disease) stage 3, GFR 30-59 ml/min ([facility])     stage 4 (as per pt)    CVA (cerebral vascular accident) ([facility] [address] )     noted on MRI in the past    Depression     GERD (gastroesophageal reflux disease)     Hypercholesteremia     Hyperlipidemia     Hypernatremia     Hypertension     Hypokalemia     Intervertebral disc disorder with radiculopathy of lumbosacral region     resolved: 12/28/2015    Kidney disease     Panic attacks     Pericardial effusion     PONV (postoperative nausea and vomiting)     Radiculitis     resolved: 12/28/2015    Secondary renal hyperparathyroidism ([facility] [address] )     Vitamin D deficiency      Present on Admission:   CKD (chronic kidney disease) stage 4, GFR 15-29 ml/min ([facility])   Bipolar 1 disorder, depressed, severe ([facility])   Gastroenteritis      Admitting Diagnosis: Hypokalemia [E87 6]  Vomiting [R11 10]  Weakness [R53 1]  Acute kidney injury ([facility] [address] ) [N17 9]  Age/Sex: 64 y o  female  Admission Orders: 2/6/2020 1538 Observation and changed 2/7/2020 1532 inpatient   Scheduled Medications:  Medications:  aspirin 81 mg Oral Daily   atorvastatin 40 mg Oral Daily   carvedilol 3 125 mg Oral BID With Meals   clonazePAM 0 5 mg Oral TID   fluocinonide  Topical BID   fluvoxaMINE 100 mg Oral TID   heparin (porcine) 5,000 Units Subcutaneous Q8H [address]   lamoTRIgine 400 mg Oral HS   linaCLOtide 290 mcg Oral Daily   pantoprazole 40 mg Oral Early Morning   QUEtiapine 300 mg Oral QAM   potassium chloride 20 mEq IVPB (premix) - given  1835, 2039  Dose: 20 mEq  Freq: Every 2 hours scheduled Route: IV  Last Dose: Stopped (02/06/20 2240)  Start: 02/06/20 1800 End: 02/06/20 2240    Continuous IV Infusions:    sodium chloride 0 9 % infusion   Rate:  125 mL/hr Dose: 125 mL/hr  Freq: Continuous Route: IV  Last Dose: 125 mL/hr (02/06/20 1529)  Start: 02/06/20 1530 End: 02/06/20 1728    PRN Meds:  Acetaminophen - used x 1 650 mg Oral Q6H PRN       IP CONSULT TO NEPHROLOGY    Network Utilization Review Department  [email or web address]  ATTENTION: Please call with any questions or concerns to [phone] and carefully listen to the prompts so that you are directed to the right person  All voicemails are confidential   Maximo Schafer all requests for admission clinical reviews, approved or denied determinations and any other requests to dedicated fax number below belonging to the campus where the patient is receiving treatment   List of dedicated fax numbers for the Facilities:  [address] DENIALS (Administrative/Medical Necessity) [phone]   [address] (Maternity/NICU/Pediatrics) [phone]   Rena Ruiz [phone]   [facility] Michelle [phone]   Gildardo Parks [phone]   Neeta Ray [phone]   [address] [phone]   [facility]  [phone]   [address] [facility] [address]  [address] [facility] [address] [facility] [phone]

## 2020-02-07 NOTE — CONSULTS
Consultation - Nephrology   Bharati Portillo 64 y o  female MRN: [id number]  Unit/Bed#: -01 Encounter: [id number]      ASSESSMENT & PLAN    82-year-old female with a past medical history of bipolar disorder type 2, stage 3 chronic kidney disease who presents for acute gastroenteritis     Chronic kidney disease, stage IV  - creatinine fluctuate S and is anywhere between 1 9 in 2 4, estimated GFR between 22 and 28 mL/minute  - she is currently not on any nephrotoxic agent,  Was on lithium for several years  - renal cyst is stable in size October of 2019  -she is off her potassium supplementation  -blood pressures have been well controlled  -urine protein to creatinine ratios have been minimal  -she has had echogenic kidneys that have been small in size since at least 2016  -completed CKD education  - status post AV fistula creation  - with a mild anion gap acidosis  - still with a poor p o  Intake give half a L of LR     Bipolar 2 disorder ([facility] [address] ), eating disorder  - she was on lithium in her 25s and may have some chronic interstitial nephritis associated with the she also has some mild hypernatremia  - she is currently being treated with Seroquel  -she is now following up with a psychiatrist and psychologist monthly, she does also have a history of an eating disorder     Cyst of right kidney  - she has had recent MRIs and CT scan that have been done serially most recently October of 2019     Secondary renal hyperparathyroidism [facility])  - she has a PTH around 125, we have discontinued her vitamin-D and calcium acid a for a rising calcium will monitor PTH as an outpatient     Proteinuria  - she has a positive urine protein to creatinine ratio but a negative urinalysis she is  mildly anemic with CKD    She was on lithium several years ago she also has had acute kidney injury from chronic lactulose  Use  -  limited serologic workup was negative last urine protein to creatinine ratio was negligible     hypertension  -she continues on her carvedilol Amiloride is on hold would restart this wound she is tolerating p o   -she is off diuretics and raas blockade, given her mental health issues she was taking large amounts of diuretics and laxatives so this point we will continue to hold these medications  -blood pressure is well controlled  -now off amlodipine blood pressure stable she was having side effects of increased edema in the past     Hypernatremia  -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 in the past her serum sodiums did go to 153 at 1 point now serum sodium stable  -she will require Amiloride long-term    Viral gastroenteritis  -continue supportive care primary team    HISTORY OF PRESENT ILLNESS:  Requesting Physician: Dara Mensah DO  Reason for Consult:  Chronic kidney disease    Jose G Fishman is a 64y o  year old female who was admitted to [facility] after presenting with diarrhea  A renal consultation is requested today for assistance in the management of chronic kidney disease  She is well known to me she has a past medical history of bipolar disorder she has had episodes of acute kidney injury related to volume depletion some related to laxative use and she has residual chronic kidney disease with a creatinine at baseline of 1 7-to 2 4 she currently feels well still with diarrhea she did have some sick contacts denies any laxative use chest pain or shortness of Breath her estimated GFR was trending downward and she now has a fistula in place for dialysis if needed her weights do fluctuate as well she states she is urinating without any difficulties    She did start eating some foods but still with poor p o   Intake   Review of systems is otherwise negative    PAST MEDICAL HISTORY:  Past Medical History:   Diagnosis Date    Ankle sprain     left    Anxiety disorder     Bipolar 2 disorder (HCC)     Chronic back pain  CKD (chronic kidney disease) stage 3, GFR 30-59 ml/min ([facility])     stage 4 (as per pt)    CVA (cerebral vascular accident) ([address] )     noted on MRI in the past    Depression     GERD (gastroesophageal reflux disease)     Hypercholesteremia     Hyperlipidemia     Hypernatremia     Hypertension     Hypokalemia     Intervertebral disc disorder with radiculopathy of lumbosacral region     resolved: 2015    Kidney disease     Panic attacks     Pericardial effusion     PONV (postoperative nausea and vomiting)     Radiculitis     resolved: 2015    Secondary renal hyperparathyroidism ([facility] [address] )     Vitamin D deficiency        PAST SURGICAL HISTORY:  Past Surgical History:   Procedure Laterality Date    BUNIONECTOMY      Left foot     COLON SURGERY      COLONOSCOPY  2018    DILATION AND CURETTAGE OF UTERUS      INDUCED       surgically induced    GA ANASTOMOSIS,AV,ANY SITE Left 2019    Procedure: CREATION FISTULA ARTERIOVENOUS (AV) left wrists possible left upper;  Surgeon: Hillary Daigle MD;  [facility];  Service: Vascular    GA CORRJ HALLUX VALGUS W/SESMDC W/DIST METAR OSTEOT Left 2019    Procedure: Karissa Cabrera;  Surgeon: Severiano Nguyen DPM;  [facility];  Service: Podiatry    GA ERCP DX COLLECTION SPECIMEN BRUSHING/WASHING N/A 2018    Procedure: ENDOSCOPIC RETROGRADE CHOLANGIOPANCREATOGRAPHY (ERCP);   Surgeon: Sana Gunn MD;  [facility];  Service: Gastroenterology    GA LAP, SURG PROCTOPEXY N/A 2018    Procedure: ROBOTIC SIGMOID RESECTION / RECTOPEXY;  Surgeon: Dennis Cook MD;  [facility];  Service: Colorectal    GA SIGMOIDOSCOPY FLX DX W/COLLJ SPEC BR/WA IF PFRMD N/A 2018    Procedure: Omar Jihan;  Surgeon: Dennis Cook MD;  [facility];  Service: Colorectal    TUBAL LIGATION Bilateral [facility] THYROID BIOPSY  2019       ALLERGIES:  Allergies   Allergen Reactions    Pollen Extract Nasal Congestion       SOCIAL HISTORY:  Social History     Substance and Sexual Activity   Alcohol Use Never    Frequency: Never    Binge frequency: Never     Social History     Substance and Sexual Activity   Drug Use Not Currently    Types: Marijuana     Social History     Tobacco Use   Smoking Status Never Smoker   Smokeless Tobacco Never Used       FAMILY HISTORY:  Family History   Problem Relation Age of Onset    Bipolar disorder Mother     Mental illness Mother         depression    Stroke Mother     Dementia Mother     Heart disease Father     Hypertension Father     Diabetes Father     Other Family         Back disorder    Diabetes Family     Heart disease Family     Hypertension Family     Breast cancer Family     Stroke Family     Thyroid disease Family     Breast cancer Paternal Grandmother     Breast cancer Paternal [de-identified]     Breast cancer Maternal Aunt     Mental illness Sister     Mental illness Sister     Heart disease Sister     No Known Problems Sister     No Known Problems Sister     Other Son         pituitary tumor    Hypertension Son     Obesity Son     No Known Problems Son     Substance Abuse Neg Hx         neg fam hx       MEDICATIONS:    Current Facility-Administered Medications:     acetaminophen (TYLENOL) tablet 650 mg, 650 mg, Oral, Q6H PRN, Jeff Zimmerr, DO, 650 mg at 02/06/20 1822    aspirin (ECOTRIN LOW STRENGTH) EC tablet 81 mg, 81 mg, Oral, Daily, Azalee Stair, DO, 81 mg at 02/07/20 0834    atorvastatin (LIPITOR) tablet 40 mg, 40 mg, Oral, Daily, Azalee Stair, DO, 40 mg at 02/07/20 0593    carvedilol (COREG) tablet 3 125 mg, 3 125 mg, Oral, BID With Meals, Jeff Murillo, DO, 3 125 mg at 02/07/20 0372    clonazePAM (KlonoPIN) tablet 0 5 mg, 0 5 mg, Oral, TID, Jeff Murillo, DO, 0 5 mg at 02/07/20 0835    fluocinonide (LIDEX) 0 05 % ointment, , Topical, BID, Azalee Stair, DO    fluvoxaMINE ([address]) tablet 100 mg, 100 mg, Oral, TID, Demi Kira Murillo DO, 100 mg at 02/07/20 0906    heparin (porcine) subcutaneous injection 5,000 Units, 5,000 Units, Subcutaneous, Q8H [facility], 5,000 Units at 02/07/20 1514 **AND** [COMPLETED] Platelet count, , , Once, Dara Mensah DO    lamoTRIgine (LaMICtal) tablet 400 mg, 400 mg, Oral, HS, Dara Mensah DO, 400 mg at 02/06/20 2105    linaCLOtide CAPS 1 capsule, 290 mcg, Oral, Daily, Milo Murillo DO    pantoprazole (PROTONIX) EC tablet 40 mg, 40 mg, Oral, Early Morning, Milo Murillo DO, 40 mg at 02/07/20 8439    QUEtiapine (SEROquel) tablet 300 mg, 300 mg, Oral, QAM, Milo Murillo DO, 300 mg at 02/07/20 7765    REVIEW OF SYSTEMS:  All the systems were reviewed and were negative except as documented on the HPI        PHYSICAL EXAM:  Current Weight: Weight - Scale: 85 5 kg (188 lb 9 6 oz)  First Weight: Weight - Scale: 86 2 kg (190 lb)  Vitals:    02/07/20 0600 02/07/20 0747 02/07/20 0815 02/07/20 1518   BP:  134/72  133/72   BP Location:  Right arm     Pulse:  84  83   Resp:  17  18   Temp:  97 8 °F (36 6 °C)  97 9 °F (36 6 °C)   TempSrc:  Oral     SpO2:  97% 97% 97%   Weight: 85 5 kg (188 lb 9 6 oz)      Height:         No intake or output data in the 24 hours ending 02/07/20 1630  General: conscious, cooperative, in no acute distress  Eyes: conjunctivae pink, anicteric sclerae  ENT: lips and mucous membranes moist  Neck: supple, no JVD  Chest: clear breath sounds bilateral, no crackles, ronchus or wheezings  CVS: normal rate, regular rhythm  Abdomen: soft, non-tender, non-distended, normoactive bowel sounds  Extremities: no edema of both legs  Skin: no rash  Neuro: awake, alert, oriented  Psych:  Pleasant affect      Invasive Devices:      Lab Results:   Results from last 7 days   Lab Units 02/07/20  1605 02/07/20  0552 02/06/20  2034 02/06/20  1734 02/06/20  1529 02/06/20  1428 02/06/20  1056 02/06/20  1055   WBC Thousand/uL  --  5 08  --   --   --   -- 5 89  --    HEMOGLOBIN g/dL  --  12 2  --   --   --   --  14 1  --    HEMATOCRIT %  --  37 6  --   --   --   --  43 7  --    PLATELETS Thousands/uL  --  214  --  217  --   --  258  --    POTASSIUM mmol/L 3 7 3 6 3 8  --   --  2 7*  --  3 1*   CHLORIDE mmol/L 111* 115* 114*  --   --  112*  --  107   CO2 mmol/L 19* 18* 18*  --   --  17*  --  16*   BUN mg/dL 20 22 26*  --   --  25  --  29*   CREATININE mg/dL 2 14* 2 25* 2 38*  --   --  1 94*  --  2 71*   CALCIUM mg/dL 9 4 9 3 9 1  --   --  7 5*  --  9 5   MAGNESIUM mg/dL  --  2 4  --   --   --   --   --  2 1   PHOSPHORUS mg/dL  --  2 9  --   --   --   --   --  3 8   ALK PHOS U/L  --   --   --   --   --   --   --  273*   ALT U/L  --   --   --   --   --   --   --  23   AST U/L  --   --   --   --   --   --   --  20   NITRITE UA   --   --   --   --  Negative  --   --   --    BLOOD UA   --   --   --   --  Negative  --   --   --    LEUKOCYTES UA   --   --   --   --  Large*  --   --   --        Other Studies:  Please see previous notes

## 2020-02-07 NOTE — ASSESSMENT & PLAN NOTE
Pt with longstanding hx of bipolar disorder  Has been on medications since she was a child  Seeing Dr Xochilt Loya and tyree for therapy at [facility] home meds- which I reviewed at length with patient

## 2020-02-07 NOTE — PLAN OF CARE
Problem: Potential for Falls  Goal: Patient will remain free of falls  Description  INTERVENTIONS:  - Assess patient frequently for physical needs  -  Identify cognitive and physical deficits and behaviors that affect risk of falls    -  Manson fall precautions as indicated by assessment   - Educate patient/family on patient safety including physical limitations  - Instruct patient to call for assistance with activity based on assessment  - Modify environment to reduce risk of injury  - Consider OT/PT consult to assist with strengthening/mobility  Outcome: Progressing     Problem: PAIN - ADULT  Goal: Verbalizes/displays adequate comfort level or baseline comfort level  Description  Interventions:  - Encourage patient to monitor pain and request assistance  - Assess pain using appropriate pain scale  - Administer analgesics based on type and severity of pain and evaluate response  - Implement non-pharmacological measures as appropriate and evaluate response  - Consider cultural and social influences on pain and pain management  - Notify physician/advanced practitioner if interventions unsuccessful or patient reports new pain  Outcome: Progressing     Problem: INFECTION - ADULT  Goal: Absence or prevention of progression during hospitalization  Description  INTERVENTIONS:  - Assess and monitor for signs and symptoms of infection  - Monitor lab/diagnostic results  - Monitor all insertion sites, i e  indwelling lines, tubes, and drains  - Monitor endotracheal if appropriate and nasal secretions for changes in amount and color  - Manson appropriate cooling/warming therapies per order  - Administer medications as ordered  - Instruct and encourage patient and family to use good hand hygiene technique  - Identify and instruct in appropriate isolation precautions for identified infection/condition  Outcome: Progressing     Problem: SAFETY ADULT  Goal: Maintain or return to baseline ADL function  Description  INTERVENTIONS:  -  Assess patient's ability to carry out ADLs; assess patient's baseline for ADL function and identify physical deficits which impact ability to perform ADLs (bathing, care of mouth/teeth, toileting, grooming, dressing, etc )  - Assess/evaluate cause of self-care deficits   - Assess range of motion  - Assess patient's mobility; develop plan if impaired  - Assess patient's need for assistive devices and provide as appropriate  - Encourage maximum independence but intervene and supervise when necessary  - Involve family in performance of ADLs  - Assess for home care needs following discharge   - Consider OT consult to assist with ADL evaluation and planning for discharge  - Provide patient education as appropriate  Outcome: Progressing  Goal: Maintain or return mobility status to optimal level  Description  INTERVENTIONS:  - Assess patient's baseline mobility status (ambulation, transfers, stairs, etc )    - Identify cognitive and physical deficits and behaviors that affect mobility  - Identify mobility aids required to assist with transfers and/or ambulation (gait belt, sit-to-stand, lift, walker, cane, etc )  - [place] fall precautions as indicated by assessment  - Record patient progress and toleration of activity level on Mobility SBAR; progress patient to next Phase/Stage  - Instruct patient to call for assistance with activity based on assessment  - Consider rehabilitation consult to assist with strengthening/weightbearing, etc   Outcome: Progressing

## 2020-02-07 NOTE — ASSESSMENT & PLAN NOTE
Pt with 2 day hx of nausea/vomiting  Resolved prior to admission  Denies abd pain  Pt has appetite  Would like to eat  -Recommending BRAT diet, but allowed pt to order what she would like  -Will monitor

## 2020-02-07 NOTE — PLAN OF CARE
Problem: Potential for Falls  Goal: Patient will remain free of falls  Description  INTERVENTIONS:  - Assess patient frequently for physical needs  -  Identify cognitive and physical deficits and behaviors that affect risk of falls    -  [place] fall precautions as indicated by assessment   - Educate patient/family on patient safety including physical limitations  - Instruct patient to call for assistance with activity based on assessment  - Modify environment to reduce risk of injury  - Consider OT/PT consult to assist with strengthening/mobility  Outcome: Progressing     Problem: PAIN - ADULT  Goal: Verbalizes/displays adequate comfort level or baseline comfort level  Description  Interventions:  - Encourage patient to monitor pain and request assistance  - Assess pain using appropriate pain scale  - Administer analgesics based on type and severity of pain and evaluate response  - Implement non-pharmacological measures as appropriate and evaluate response  - Consider cultural and social influences on pain and pain management  - Notify physician/advanced practitioner if interventions unsuccessful or patient reports new pain  Outcome: Progressing     Problem: INFECTION - ADULT  Goal: Absence or prevention of progression during hospitalization  Description  INTERVENTIONS:  - Assess and monitor for signs and symptoms of infection  - Monitor lab/diagnostic results  - Monitor all insertion sites, i e  indwelling lines, tubes, and drains  - Monitor endotracheal if appropriate and nasal secretions for changes in amount and color  - [place] appropriate cooling/warming therapies per order  - Administer medications as ordered  - Instruct and encourage patient and family to use good hand hygiene technique  - Identify and instruct in appropriate isolation precautions for identified infection/condition  Outcome: Progressing     Problem: SAFETY ADULT  Goal: Maintain or return to baseline ADL function  Description  INTERVENTIONS:  -  Assess patient's ability to carry out ADLs; assess patient's baseline for ADL function and identify physical deficits which impact ability to perform ADLs (bathing, care of mouth/teeth, toileting, grooming, dressing, etc )  - Assess/evaluate cause of self-care deficits   - Assess range of motion  - Assess patient's mobility; develop plan if impaired  - Assess patient's need for assistive devices and provide as appropriate  - Encourage maximum independence but intervene and supervise when necessary  - Involve family in performance of ADLs  - Assess for home care needs following discharge   - Consider OT consult to assist with ADL evaluation and planning for discharge  - Provide patient education as appropriate  Outcome: Progressing  Goal: Maintain or return mobility status to optimal level  Description  INTERVENTIONS:  - Assess patient's baseline mobility status (ambulation, transfers, stairs, etc )    - Identify cognitive and physical deficits and behaviors that affect mobility  - Identify mobility aids required to assist with transfers and/or ambulation (gait belt, sit-to-stand, lift, walker, cane, etc )  - [place] fall precautions as indicated by assessment  - Record patient progress and toleration of activity level on Mobility SBAR; progress patient to next Phase/Stage  - Instruct patient to call for assistance with activity based on assessment  - Consider rehabilitation consult to assist with strengthening/weightbearing, etc   Outcome: Progressing

## 2020-02-07 NOTE — H&P
H&P- Colin Santos 1960, 64 y o  female MRN: [id number]    Unit/Bed#: -01 Encounter: [id number]    Primary Care Provider: Ar Baca DO   Date and time admitted to hospital: 2/6/2020 10:43 AM        * Hypokalemia  Assessment & Plan  Pt with K+ 2 7 in setting of CKD IV on admission  Most likely secondary to GI illness   -Will replete with 40meq KCL IVPB  -Repeat BMP at [address]  Pt with 2 day hx of nausea/vomiting  Resolved prior to admission  Denies abd pain  Pt has appetite  Would like to eat  -Recommending BRAT diet, but allowed pt to order what she would like  -Will monitor  CKD (chronic kidney disease) stage 4, GFR 15-29 ml/min ([facility])  Assessment & Plan  Pt with hx of CKD stage IV  Most likely secondary to lithium use in her 20's  Pt follows with neprhology, Dr Vonda Emery  Last visit 12/26/19  Pt's Cr fluctuates and is anywhere between 1 9 to 2 4, estimated GFR between 22 and 28 mL/minute  Pt's cr 2 7 on presentation to ED this AM   Improved to 1 9 with hydration  Pt currently makes urine  Has not required HD  However, pt has AV fistula in place in E  Nephrology consult placed  Bipolar 1 disorder, depressed, severe ([facility] [address] )  Assessment & Plan  Pt with longstanding hx of bipolar disorder  Has been on medications since she was a child  Seeing Dr John Perez for therapy at [facility]  Continue home meds- which I reviewed at length with patient  VTE Prophylaxis: Heparin  / sequential compression device   Code Status: full code  Discussion with pt  Anticipated Length of Stay:  Patient will be admitted on an Observation basis with an anticipated length of stay of  Less than 2 midnights  Justification for Hospital Stay:  electroyle repeletion, tele monitoring    Total Time for Visit, including Counseling / Coordination of Care: 30 minutes    Greater than 50% of this total time spent on direct patient counseling and coordination of care  Chief Complaint:   Not feeling well- nausea, vomitting    History of Present Illness:    Johan Mccartney is a 64 y o  female who presents with 2 day hx of nausea, non-bloody, nonbilious vomiting  Pt stated that she had been unable to keep anything down and was beginning to feel weak, tired  Pt states that she is feeling much better now  Pt has not vomited or needed anti-nausea medication since presentation in the ED  ED workup revealed hypokalemia of 3 1 and Cr of 2 71  Potassim was repleted and upon recheck was 2 7  Pt was asymptomatic and I was called to admit pt for telemetry monitoring and continued repletion  Pt's GI symptoms resolved prior to admission  Pt denies any recent travel or sick contacts  Review of Systems:    Review of Systems   Constitutional: Positive for activity change, appetite change and fatigue  Negative for chills and fever  HENT: Negative for sinus pressure  Respiratory: Negative for cough, shortness of breath and wheezing  Gastrointestinal: Positive for nausea and vomiting  Genitourinary: Negative for dysuria and urgency  Musculoskeletal: Negative for arthralgias and myalgias  Neurological: Positive for headaches  Negative for dizziness  Psychiatric/Behavioral: Positive for confusion  Negative for agitation         Past Medical and Surgical History:     Past Medical History:   Diagnosis Date    Ankle sprain     left    Anxiety disorder     Bipolar 2 disorder (HCC)     Chronic back pain     CKD (chronic kidney disease) stage 3, GFR 30-59 ml/min ([facility])     stage 4 (as per pt)    CVA (cerebral vascular accident) ([facility] [address] )     noted on MRI in the past    Depression     GERD (gastroesophageal reflux disease)     Hypercholesteremia     Hyperlipidemia     Hypernatremia     Hypertension     Hypokalemia     Intervertebral disc disorder with radiculopathy of lumbosacral region     resolved: 12/28/2015    Kidney disease     Panic attacks     Pericardial effusion     PONV (postoperative nausea and vomiting)     Radiculitis     resolved: 2015    Secondary renal hyperparathyroidism ([facility] [address] )     Vitamin D deficiency        Past Surgical History:   Procedure Laterality Date    BUNIONECTOMY      Left foot     COLON SURGERY      COLONOSCOPY  2018    DILATION AND CURETTAGE OF UTERUS      INDUCED       surgically induced    FL ANASTOMOSIS,AV,ANY SITE Left 2019    Procedure: CREATION FISTULA ARTERIOVENOUS (AV) left wrists possible left upper;  Surgeon: Fox Villalba MD;  [facility];  Service: Vascular    FL CORRJ HALLUX VALGUS W/SESMDC W/DIST METAR OSTEOT Left 2019    Procedure: Rosalea Rabia;  Surgeon: Alison Loza DPM;  [facility];  Service: Podiatry    FL ERCP DX COLLECTION SPECIMEN BRUSHING/WASHING N/A 2018    Procedure: ENDOSCOPIC RETROGRADE CHOLANGIOPANCREATOGRAPHY (ERCP); Surgeon: Kiara Roman MD;  [facility];  Service: Gastroenterology    FL LAP, SURG PROCTOPEXY N/A 2018    Procedure: ROBOTIC SIGMOID RESECTION / RECTOPEXY;  Surgeon: Cary Hoang MD;  [facility];  Service: Colorectal    FL SIGMOIDOSCOPY FLX DX W/COLLJ SPEC BR/WA IF PFRMD N/A 2018    Procedure: Colin Selby;  Surgeon: Cary Hoang MD;  [facility];  Service: Colorectal    TUBAL LIGATION Bilateral [facility] THYROID BIOPSY  2019       Meds/Allergies:    Prior to Admission medications    Medication Sig Start Date End Date Taking?  Authorizing Provider   AMILoride 5 mg tablet Take 5 mg by mouth 2 (two) times a day 19  Yes Historical Provider, MD   aspirin (ECOTRIN LOW STRENGTH) 81 mg EC tablet Take 1 tablet (81 mg total) by mouth daily 19  Yes Bette Fly, DO   atorvastatin (LIPITOR) 40 mg tablet Take 1 tablet (40 mg total) by mouth daily 10/2/19  Yes Betteguido Harp, DO   carvedilol (COREG) 3 125 mg tablet TAKE 1 TABLET BY MOUTH TWICE DAILY WITH MEALS 11/5/19  Yes Pedro Vu PA-C   clonazePAM (KlonoPIN) 0 5 mg tablet Take 1 tablet (0 5 mg total) by mouth 3 (three) times a day 11/21/19  Yes Bette Fly, DO   fluocinonide (LIDEX) 0 05 % ointment Apply topically 2 (two) times a day 1/8/20  Yes Bette Fly, DO   fluvoxaMINE (LUVOX) 100 mg tablet Take 100 mg by mouth 3 (three) times a day   10/10/18  Yes Historical Provider, MD   lamoTRIgine (LaMICtal) 100 mg tablet Take 400 mg by mouth daily at bedtime    Yes Historical Provider, MD   linaCLOtide Toi Sin) 290 MCG CAPS Take 1 capsule by mouth daily for 90 days 9/3/19 2/6/20 Yes Raguel Opitz, MD   omeprazole (PriLOSEC) 40 MG capsule TAKE 1 CAPSULE BY MOUTH  DAILY AT BEDTIME 6/19/19  Yes Bette Fly, DO   ondansetron (ZOFRAN) 4 mg tablet Take 1 tablet (4 mg total) by mouth every 8 (eight) hours as needed for nausea or vomiting for up to 4 days 2/5/20 2/9/20 Yes Bette Fly, DO   QUEtiapine (SEROQUEL) 300 mg tablet Take 1 tablet (300 mg total) by mouth every morning 1 in the AM     ( also takes 400 mg at bedtime) 1/2/19  Yes Betet Harp, DO   QUEtiapine (SEROquel) 400 MG tablet 1 at bedtime   Yes Historical Provider, MD   acetaminophen (TYLENOL) 325 mg tablet Take 650 mg by mouth every 6 (six) hours as needed for mild pain    Historical Provider, MD   albuterol (PROAIR HFA) 90 mcg/act inhaler Inhale 2 puffs every 6 (six) hours as needed for wheezing 8/27/19   Jesús Baig DO   budesonide-formoterol (SYMBICORT) 160-4 5 mcg/act inhaler Inhale 2 puffs 2 (two) times a day Rinse mouth after use    Patient not taking: Reported on 2/6/2020 8/27/19   Jesús Baig DO   Polyethylene Glycol 3350 (MIRALAX PO) Take by mouth as needed     Historical Provider, MD   traMADol (ULTRAM) 50 mg tablet Take 1 tablet (50 mg total) by mouth 2 (two) times a day  Patient not taking: Reported on 2/6/2020 10/30/19   Lucia Castillo DO   amLODIPine (NORVASC) 5 mg tablet  11/15/19 2/6/20  Historical Provider, MD   lamoTRIgine (LaMICtal) 200 MG tablet  1/23/20 2/6/20  Historical Provider, MD PÉREZ have reviewed home medications with patient personally  Allergies: Allergies   Allergen Reactions    Pollen Extract Nasal Congestion       Social History:     Marital Status:    Patient Pre-hospital Living Situation: with sister  Patient Pre-hospital Level of Mobility: fully independent, no assistive devices  Patient Pre-hospital Diet Restrictions: none  Substance Use History:   Social History     Substance and Sexual Activity   Alcohol Use Never    Frequency: Never    Binge frequency: Never     Social History     Tobacco Use   Smoking Status Never Smoker   Smokeless Tobacco Never Used     Social History     Substance and Sexual Activity   Drug Use Not Currently    Types: Marijuana       Family History:    non-contributory    Physical Exam:     Vitals:   Blood Pressure: 126/68 (02/07/20 0002)  Pulse: 70 (02/07/20 0002)  Temperature: 97 8 °F (36 6 °C) (02/07/20 0002)  Temp Source: Tympanic (02/06/20 1047)  Respirations: 17 (02/07/20 0002)  Height: 5' 9 6" (176 8 cm) (02/06/20 1637)  Weight - Scale: 86 3 kg (190 lb 4 1 oz) (02/06/20 1724)  SpO2: 94 % (02/07/20 0002)    Physical Exam   Constitutional: She is oriented to person, place, and time  No distress  HENT:   Head: Normocephalic and atraumatic  Eyes: Pupils are equal, round, and reactive to light  Conjunctivae and EOM are normal    Cardiovascular: Normal rate and regular rhythm  Pulmonary/Chest: Effort normal and breath sounds normal  No respiratory distress  She has no wheezes  She has no rales  Abdominal: Soft  Bowel sounds are normal  She exhibits no distension  There is no tenderness  Musculoskeletal: She exhibits no edema or tenderness  Neurological: She is alert and oriented to person, place, and time  Skin: Skin is warm and dry  She is not diaphoretic  Psychiatric: She has a normal mood and affect         Additional Data:     Lab Results: I have personally reviewed pertinent reports  Results from last 7 days   Lab Units 02/06/20  1734 02/06/20  1056   WBC Thousand/uL  --  5 89   HEMOGLOBIN g/dL  --  14 1   HEMATOCRIT %  --  43 7   PLATELETS Thousands/uL 217 258   NEUTROS PCT %  --  59   LYMPHS PCT %  --  21   MONOS PCT %  --  7   EOS PCT %  --  11*     Results from last 7 days   Lab Units 02/06/20  2034  02/06/20  1055   SODIUM mmol/L 144   < > 139   POTASSIUM mmol/L 3 8   < > 3 1*   CHLORIDE mmol/L 114*   < > 107   CO2 mmol/L 18*   < > 16*   BUN mg/dL 26*   < > 29*   CREATININE mg/dL 2 38*   < > 2 71*   ANION GAP mmol/L 12   < > 16*   CALCIUM mg/dL 9 1   < > 9 5   ALBUMIN g/dL  --   --  4 0   TOTAL BILIRUBIN mg/dL  --   --  0 30   ALK PHOS U/L  --   --  273*   ALT U/L  --   --  23   AST U/L  --   --  20   GLUCOSE RANDOM mg/dL 108   < > 119    < > = values in this interval not displayed  Imaging: I have personally reviewed pertinent reports  CT abdomen pelvis wo contrast   Final Result by Opal Esquivel MD (02/06 1216)      Mild air fluid distention of the right hemicolon to the level of the splenic flexure without discrete obstructing abnormality, wall thickening or inflammation evident  Grossly stable partially exophytic nodule left lower pole  Workstation performed: [id number]             EKG, Pathology, and Other Studies Reviewed on Admission:   · EKG: NSR, prolonged QT- unchanged from prior    Allscripts / Epic Records Reviewed: Yes     ** Please Note: This note has been constructed using a voice recognition system   **

## 2020-02-08 LAB
ANION GAP SERPL CALCULATED.3IONS-SCNC: 10 MMOL/L (ref 4–13)
BUN SERPL-MCNC: 16 MG/DL (ref 5–25)
CALCIUM SERPL-MCNC: 9.4 MG/DL (ref 8.3–10.1)
CHLORIDE SERPL-SCNC: 113 MMOL/L (ref 100–108)
CO2 SERPL-SCNC: 20 MMOL/L (ref 21–32)
CREAT SERPL-MCNC: 2.1 MG/DL (ref 0.6–1.3)
GFR SERPL CREATININE-BSD FRML MDRD: 26 ML/MIN/1.73SQ M
GLUCOSE SERPL-MCNC: 103 MG/DL (ref 65–140)
POTASSIUM SERPL-SCNC: 3.7 MMOL/L (ref 3.5–5.3)
SODIUM SERPL-SCNC: 143 MMOL/L (ref 136–145)

## 2020-02-08 PROCEDURE — 99233 SBSQ HOSP IP/OBS HIGH 50: CPT | Performed by: INTERNAL MEDICINE

## 2020-02-08 PROCEDURE — 80048 BASIC METABOLIC PNL TOTAL CA: CPT | Performed by: FAMILY MEDICINE

## 2020-02-08 RX ADMIN — CLONAZEPAM 0.5 MG: 0.5 TABLET ORAL at 17:32

## 2020-02-08 RX ADMIN — HEPARIN SODIUM 5000 UNITS: 5000 INJECTION INTRAVENOUS; SUBCUTANEOUS at 15:31

## 2020-02-08 RX ADMIN — TRAMADOL HYDROCHLORIDE 50 MG: 50 TABLET, FILM COATED ORAL at 17:34

## 2020-02-08 RX ADMIN — FLUVOXAMINE MALEATE 100 MG: 100 TABLET ORAL at 09:02

## 2020-02-08 RX ADMIN — FLUOCINONIDE: 0.5 OINTMENT TOPICAL at 17:37

## 2020-02-08 RX ADMIN — CARVEDILOL 3.12 MG: 3.12 TABLET, FILM COATED ORAL at 17:32

## 2020-02-08 RX ADMIN — TRAMADOL HYDROCHLORIDE 50 MG: 50 TABLET, FILM COATED ORAL at 09:01

## 2020-02-08 RX ADMIN — ASPIRIN 81 MG: 81 TABLET, COATED ORAL at 09:01

## 2020-02-08 RX ADMIN — LAMOTRIGINE 400 MG: 100 TABLET ORAL at 21:11

## 2020-02-08 RX ADMIN — FLUVOXAMINE MALEATE 100 MG: 100 TABLET ORAL at 17:36

## 2020-02-08 RX ADMIN — FLUVOXAMINE MALEATE 100 MG: 100 TABLET ORAL at 21:12

## 2020-02-08 RX ADMIN — FLUOCINONIDE: 0.5 OINTMENT TOPICAL at 09:02

## 2020-02-08 RX ADMIN — CLONAZEPAM 0.5 MG: 0.5 TABLET ORAL at 21:11

## 2020-02-08 RX ADMIN — ATORVASTATIN CALCIUM 40 MG: 40 TABLET, FILM COATED ORAL at 09:04

## 2020-02-08 RX ADMIN — CLONAZEPAM 0.5 MG: 0.5 TABLET ORAL at 09:01

## 2020-02-08 RX ADMIN — CARVEDILOL 3.12 MG: 3.12 TABLET, FILM COATED ORAL at 09:01

## 2020-02-08 RX ADMIN — PANTOPRAZOLE SODIUM 40 MG: 40 TABLET, DELAYED RELEASE ORAL at 05:41

## 2020-02-08 RX ADMIN — HEPARIN SODIUM 5000 UNITS: 5000 INJECTION INTRAVENOUS; SUBCUTANEOUS at 21:12

## 2020-02-08 RX ADMIN — HEPARIN SODIUM 5000 UNITS: 5000 INJECTION INTRAVENOUS; SUBCUTANEOUS at 05:41

## 2020-02-08 RX ADMIN — QUETIAPINE FUMARATE 300 MG: 300 TABLET ORAL at 09:04

## 2020-02-08 NOTE — PROGRESS NOTES
NEPHROLOGY PROGRESS NOTE   Hakan Cummins 64 y o  female MRN: [id number]  Unit/Bed#: -01 Encounter: [id number]  Reason for Consult: CAM/CKD    ASSESSMENT AND PLAN:  61-year-old female with a past medical history of bipolar disorder type 2, stage 3 chronic kidney disease who presents for acute gastroenteritis    CKD stage 4, baseline creatinine 1 9 to 2 4  -patient was on lithium for several years prior   -creatinine 2 1 overall stable at baseline  -CT scan this month shows small nonobstructing calcification in right kidney needs otherwise no acute pathology reported, left lower focal lobulated nodule without any suspicious change compared to prior  -urinalysis this month shows no hematuria, no proteinuria  10 to 20 WBCs  -now remains off IV fluid    Bipolar disorder, she was on lithium in her 25s and may have some chronic interstitial nephritis associated with this  Secondary renal hyperparathyroidism,  continue to closely monitor    Prior proteinuria, most recent urinalysis shows no proteinuria,  -prior upc ratio 500 mg in January 2020  -was on lithium several years ago    Hypertension  -blood pressure well controlled, avoid hypotension, currently remains on Coreg    Discussed above plan with primary team    SUBJECTIVE:  Patient seen and examined at bedside  She had four loose BM today    No chest pain, shortness of breath, nausea, vomiting, abdominal pain    OBJECTIVE:  Current Weight: Weight - Scale: 86 7 kg (191 lb 2 2 oz)  Vitals:    02/08/20 1731   BP: 122/68   Pulse: 77   Resp:    Temp:    SpO2: 97%     No intake or output data in the 24 hours ending 02/08/20 1816  Wt Readings from Last 3 Encounters:   02/08/20 86 7 kg (191 lb 2 2 oz)   01/30/20 88 9 kg (196 lb)   01/08/20 89 9 kg (198 lb 3 2 oz)     Temp Readings from Last 3 Encounters:   02/08/20 98 1 °F (36 7 °C)   12/02/19 99 3 °F (37 4 °C) (Tympanic)   11/18/19 98 °F (36 7 °C) (Oral)     BP Readings from Last 3 Encounters: 02/08/20 122/68   01/30/20 119/68   01/08/20 118/68     Pulse Readings from Last 3 Encounters:   02/08/20 77   01/30/20 92   01/08/20 103        Physical Examination:  General:  Lying in bed, no acute distress   Eyes:  No conjunctival pallor present  ENT:  External examination of ears and nose unremarkable  Neck:  No obvious lymphadenopathy appreciated  Respiratory:  Bilateral air entry present  CVS:  S1, S2 present  GI:  Soft, nontender, nondistended  CNS:  Active alert oriented x3  Extremities:  No significant edema in legs  Skin:  No new rash in legs    Medications:    Current Facility-Administered Medications:     acetaminophen (TYLENOL) tablet 650 mg, 650 mg, Oral, Q6H PRN, Virgen Murillo DO, 650 mg at 02/06/20 1822    aspirin (ECOTRIN LOW STRENGTH) EC tablet 81 mg, 81 mg, Oral, Daily, MARCELLE Freed DO, 81 mg at 02/08/20 0901    atorvastatin (LIPITOR) tablet 40 mg, 40 mg, Oral, Daily, MARCELLE [employer] DO, 40 mg at 02/08/20 0633    carvedilol (COREG) tablet 3 125 mg, 3 125 mg, Oral, BID With Meals, MARCELLE Freed DO, 3 125 mg at 02/08/20 1732    clonazePAM (KlonoPIN) tablet 0 5 mg, 0 5 mg, Oral, TID, Virgen Murillo DO, 0 5 mg at 02/08/20 1732    fluocinonide (LIDEX) 0 05 % ointment, , Topical, BID, Milo Murillo DO    fluvoxaMINE (LUVOX) tablet 100 mg, 100 mg, Oral, TID, MARCELLE Freed DO, 100 mg at 02/08/20 1736    heparin (porcine) subcutaneous injection 5,000 Units, 5,000 Units, Subcutaneous, Q8H [address], 5,000 Units at 02/08/20 1531 **AND** [COMPLETED] Platelet count, , , Once, MARCELLE Freed DO    lamoTRIgine (LaMICtal) tablet 400 mg, 400 mg, Oral, HS, Milo Murillo DO, 400 mg at 02/07/20 2117    linaCLOtide CAPS 1 capsule, 290 mcg, Oral, Daily, Milo Murillo DO    ondansetron [facility]) injection 4 mg, 4 mg, Intravenous, Q4H PRN, Milo Murillo DO    pantoprazole (PROTONIX) EC tablet 40 mg, 40 mg, Oral, Early Morning, Milo Murillo DO, 40 mg at 02/08/20 0541    QUEtiapine (SEROquel) tablet 300 mg, 300 mg, Oral, QAM, Milo SCHOFIELD Bertinelsachio DO, 300 mg at 02/08/20 5402    traMADol (ULTRAM) tablet 50 mg, 50 mg, Oral, BID, Tae Murillo DO, 50 mg at 02/08/20 1734    Laboratory Results:  Results from last 7 days   Lab Units 02/08/20  0620 02/07/20  1605 02/07/20  0552 02/06/20  2034 02/06/20  1734 02/06/20  1428 02/06/20  1056 02/06/20  1055   WBC Thousand/uL  --   --  5 08  --   --   --  5 89  --    HEMOGLOBIN g/dL  --   --  12 2  --   --   --  14 1  --    HEMATOCRIT %  --   --  37 6  --   --   --  43 7  --    PLATELETS Thousands/uL  --   --  214  --  217  --  258  --    SODIUM mmol/L 143 144 145 144  --  143  --  139   POTASSIUM mmol/L 3 7 3 7 3 6 3 8  --  2 7*  --  3 1*   CHLORIDE mmol/L 113* 111* 115* 114*  --  112*  --  107   CO2 mmol/L 20* 19* 18* 18*  --  17*  --  16*   BUN mg/dL 16 20 22 26*  --  25  --  29*   CREATININE mg/dL 2 10* 2 14* 2 25* 2 38*  --  1 94*  --  2 71*   CALCIUM mg/dL 9 4 9 4 9 3 9 1  --  7 5*  --  9 5   MAGNESIUM mg/dL  --   --  2 4  --   --   --   --  2 1   PHOSPHORUS mg/dL  --   --  2 9  --   --   --   --  3 8       CT abdomen pelvis wo contrast   Final Result by Mami Hernandez MD (02/06 1216)      Mild air fluid distention of the right hemicolon to the level of the splenic flexure without discrete obstructing abnormality, wall thickening or inflammation evident  Grossly stable partially exophytic nodule left lower pole  Workstation performed: [id number]             Portions of the record may have been created with voice recognition software  Occasional wrong word or "sound a like" substitutions may have occurred due to the inherent limitations of voice recognition software  Read the chart carefully and recognize, using context, where substitutions have occurred

## 2020-02-08 NOTE — ASSESSMENT & PLAN NOTE
Pt with hx of CKD stage IV  Most likely secondary to lithium use in her 20's  Pt follows with neprhology, Dr Samantha Go  Last visit 12/26/19  Pt's Cr fluctuates and is anywhere between 1 9 to 2 4, estimated GFR between 22 and 28 mL/minute  Pt's cr 2 7 on presentation to ED this AM   Improved to 1 9 with hydration  Pt currently makes urine  Has not required HD  However, pt has AV fistula in place in LUE  Nephrology consult placed  Recs appreciated

## 2020-02-08 NOTE — ASSESSMENT & PLAN NOTE
Pt with 2 day hx of nausea/vomiting  Had improved but had episode of diarrhea, vomiting this AM   Will continue supportive care  Needs to remain inpatient until able to tolerate PO

## 2020-02-08 NOTE — PROGRESS NOTES
Progress Note - Jose G Fishman 1960, 64 y o  female MRN: [id number]    Unit/Bed#: -01 Encounter: [id number]    Primary Care Provider: Rhonda Hutchinson DO   Date and time admitted to hospital: 2/6/2020 10:43 AM        * Hypokalemia  Assessment & Plan  Pt with K+ 2 7 in setting of CKD IV on admission  Most likely secondary to GI illness  Repleted with 40meq KCL IVPB  Improved to 3 8 this AM   -Holding amloride      Gastroenteritis  Assessment & Plan  Pt with 2 day hx of nausea/vomiting  Had improved but had episode of diarrhea, vomiting this AM   Will continue supportive care  Needs to remain inpatient until able to tolerate PO      CKD (chronic kidney disease) stage 4, GFR 15-29 ml/min (formerly [facility])  Assessment & Plan  Pt with hx of CKD stage IV  Most likely secondary to lithium use in her 20's  Pt follows with neprhology, Dr Collins Half  Last visit 12/26/19  Pt's Cr fluctuates and is anywhere between 1 9 to 2 4, estimated GFR between 22 and 28 mL/minute  Pt's cr 2 7 on presentation to ED this AM   Improved to 1 9 with hydration  Pt currently makes urine  Has not required HD  However, pt has AV fistula in place in LUE  Nephrology consult placed  Recs appreciated  Bipolar 1 disorder, depressed, severe ([address] )  Assessment & Plan  Pt with longstanding hx of bipolar disorder  Has been on medications since she was a child  Seeing Dr Xochilt Loya and tyree for therapy at [facility]  Continue home meds- which I reviewed at length with patient  VTE Pharmacologic Prophylaxis:   Pharmacologic: Heparin  Mechanical VTE Prophylaxis in Place: Yes    Patient Centered Rounds: I have performed bedside rounds with nursing staff today  Discussions with Specialists or Other Care Team Provider: neprho    Education and Discussions with Family / Patient: pt    Time Spent for Care: 20 minutes  More than 50% of total time spent on counseling and coordination of care as described above      Current Length of Stay: 1 day(s)    Current Patient Status: Inpatient   Certification Statement: The patient will continue to require additional inpatient hospital stay due to electrolyte monitoring, tele, IV hydration    Discharge Plan: home    Code Status: Level 1 - Full Code      Subjective:   No acute events overnight  Pt has had nausea and vomiting after attempting to eat this AM, as well as 1 episode of diarrhrea  Objective:     Vitals:   Temp (24hrs), Av 7 °F (36 5 °C), Min:97 5 °F (36 4 °C), Max:97 9 °F (36 6 °C)    Temp:  [97 5 °F (36 4 °C)-97 9 °F (36 6 °C)] 97 5 °F (36 4 °C)  HR:  [68-94] 68  Resp:  [17-18] 17  BP: (104-134)/(45-78) 104/45  SpO2:  [94 %-97 %] 97 %  Body mass index is 27 37 kg/m²  Input and Output Summary (last 24 hours):     No intake or output data in the 24 hours ending 20 0022    Physical Exam:     Physical Exam   Constitutional: She is oriented to person, place, and time  No distress  HENT:   Head: Normocephalic and atraumatic  Eyes: Pupils are equal, round, and reactive to light  Conjunctivae and EOM are normal    Cardiovascular: Normal rate and regular rhythm  Pulmonary/Chest: Effort normal and breath sounds normal  No respiratory distress  She has no wheezes  She has no rales  Abdominal: Soft  Bowel sounds are normal  She exhibits no distension  There is no tenderness  Musculoskeletal: She exhibits no edema or tenderness  Neurological: She is alert and oriented to person, place, and time  Skin: Skin is warm and dry  She is not diaphoretic  Psychiatric: She has a normal mood and affect         Additional Data:     Labs:    Results from last 7 days   Lab Units 20  0552  20  1056   WBC Thousand/uL 5 08  --  5 89   HEMOGLOBIN g/dL 12 2  --  14 1   HEMATOCRIT % 37 6  --  43 7   PLATELETS Thousands/uL 214   < > 258   NEUTROS PCT %  --   --  59   LYMPHS PCT %  --   --  21   MONOS PCT %  --   --  7   EOS PCT %  --   --  11*    < > = values in this interval not displayed  Results from last 7 days   Lab Units 02/07/20  1605  02/06/20  1055   SODIUM mmol/L 144   < > 139   POTASSIUM mmol/L 3 7   < > 3 1*   CHLORIDE mmol/L 111*   < > 107   CO2 mmol/L 19*   < > 16*   BUN mg/dL 20   < > 29*   CREATININE mg/dL 2 14*   < > 2 71*   ANION GAP mmol/L 14*   < > 16*   CALCIUM mg/dL 9 4   < > 9 5   ALBUMIN g/dL  --   --  4 0   TOTAL BILIRUBIN mg/dL  --   --  0 30   ALK PHOS U/L  --   --  273*   ALT U/L  --   --  23   AST U/L  --   --  20   GLUCOSE RANDOM mg/dL 87   < > 119    < > = values in this interval not displayed  * I Have Reviewed All Lab Data Listed Above  * Additional Pertinent Lab Tests Reviewed:  Babs Mendiola Admission Reviewed      Recent Cultures (last 7 days):     Results from last 7 days   Lab Units 02/06/20  1529   URINE CULTURE  50,000-59,000 cfu/ml        Last 24 Hours Medication List:     Current Facility-Administered Medications:  acetaminophen 650 mg Oral Q6H PRN Corinne Neely DO    aspirin 81 mg Oral Daily Milo Murillo, DO    atorvastatin 40 mg Oral Daily Corinne Neely, DO    carvedilol 3 125 mg Oral BID With Meals Corinne Neely,     clonazePAM 0 5 mg Oral TID Corinne Neely, DO    fluocinonide  Topical BID Milo Murillo DO    fluvoxaMINE 100 mg Oral TID Corinne Neely DO    heparin (porcine) 5,000 Units Subcutaneous Q8H [address] Milo Murillo DO    lactated ringers 500 mL Intravenous Once [employer], DO Last Rate: 500 mL (02/07/20 1758)   lamoTRIgine 400 mg Oral HS Milo Murillo DO    linaCLOtide 290 mcg Oral Daily Milo Murillo DO    ondansetron 4 mg Intravenous Q4H PRN Adelina Murillo DO    pantoprazole 40 mg Oral Early Morning Milo Murillo DO    QUEtiapine 300 mg Oral QAM Milo Murillo DO    traMADol 50 mg Oral BID Corinne Neely DO         Today, Patient Was Seen By: Austin Florinda, DO    ** Please Note: Dictation voice to text software may have been used in the creation of this document   **

## 2020-02-08 NOTE — PLAN OF CARE
Problem: Potential for Falls  Goal: Patient will remain free of falls  Description  INTERVENTIONS:  - Assess patient frequently for physical needs  -  Identify cognitive and physical deficits and behaviors that affect risk of falls    -  [place] fall precautions as indicated by assessment   - Educate patient/family on patient safety including physical limitations  - Instruct patient to call for assistance with activity based on assessment  - Modify environment to reduce risk of injury  - Consider OT/PT consult to assist with strengthening/mobility  Outcome: Progressing     Problem: PAIN - ADULT  Goal: Verbalizes/displays adequate comfort level or baseline comfort level  Description  Interventions:  - Encourage patient to monitor pain and request assistance  - Assess pain using appropriate pain scale  - Administer analgesics based on type and severity of pain and evaluate response  - Implement non-pharmacological measures as appropriate and evaluate response  - Consider cultural and social influences on pain and pain management  - Notify physician/advanced practitioner if interventions unsuccessful or patient reports new pain  Outcome: Progressing     Problem: INFECTION - ADULT  Goal: Absence or prevention of progression during hospitalization  Description  INTERVENTIONS:  - Assess and monitor for signs and symptoms of infection  - Monitor lab/diagnostic results  - Monitor all insertion sites, i e  indwelling lines, tubes, and drains  - Monitor endotracheal if appropriate and nasal secretions for changes in amount and color  - [place] appropriate cooling/warming therapies per order  - Administer medications as ordered  - Instruct and encourage patient and family to use good hand hygiene technique  - Identify and instruct in appropriate isolation precautions for identified infection/condition  Outcome: Progressing     Problem: SAFETY ADULT  Goal: Maintain or return to baseline ADL function  Description  INTERVENTIONS:  -  Assess patient's ability to carry out ADLs; assess patient's baseline for ADL function and identify physical deficits which impact ability to perform ADLs (bathing, care of mouth/teeth, toileting, grooming, dressing, etc )  - Assess/evaluate cause of self-care deficits   - Assess range of motion  - Assess patient's mobility; develop plan if impaired  - Assess patient's need for assistive devices and provide as appropriate  - Encourage maximum independence but intervene and supervise when necessary  - Involve family in performance of ADLs  - Assess for home care needs following discharge   - Consider OT consult to assist with ADL evaluation and planning for discharge  - Provide patient education as appropriate  Outcome: Progressing  Goal: Maintain or return mobility status to optimal level  Description  INTERVENTIONS:  - Assess patient's baseline mobility status (ambulation, transfers, stairs, etc )    - Identify cognitive and physical deficits and behaviors that affect mobility  - Identify mobility aids required to assist with transfers and/or ambulation (gait belt, sit-to-stand, lift, walker, cane, etc )  - [place] fall precautions as indicated by assessment  - Record patient progress and toleration of activity level on Mobility SBAR; progress patient to next Phase/Stage  - Instruct patient to call for assistance with activity based on assessment  - Consider rehabilitation consult to assist with strengthening/weightbearing, etc   Outcome: Progressing

## 2020-02-08 NOTE — ASSESSMENT & PLAN NOTE
Pt with K+ 2 7 in setting of CKD IV on admission  Most likely secondary to GI illness  Repleted with 40meq KCL IVPB    Improved to 3 8 this AM   -Holding amloride

## 2020-02-08 NOTE — ASSESSMENT & PLAN NOTE
Pt with longstanding hx of bipolar disorder  Has been on medications since she was a child  Seeing Dr Montserrat Darnell and tyree for therapy at [facility]  Continue home meds- which I reviewed at length with patient

## 2020-02-09 VITALS
TEMPERATURE: 97.7 F | WEIGHT: 188.5 LBS | RESPIRATION RATE: 16 BRPM | OXYGEN SATURATION: 97 % | BODY MASS INDEX: 26.99 KG/M2 | SYSTOLIC BLOOD PRESSURE: 114 MMHG | HEIGHT: 70 IN | HEART RATE: 67 BPM | DIASTOLIC BLOOD PRESSURE: 54 MMHG

## 2020-02-09 PROBLEM — E87.6 HYPOKALEMIA: Status: RESOLVED | Noted: 2017-01-17 | Resolved: 2020-02-09

## 2020-02-09 PROBLEM — K52.9 GASTROENTERITIS: Status: RESOLVED | Noted: 2020-02-06 | Resolved: 2020-02-09

## 2020-02-09 LAB
ANION GAP SERPL CALCULATED.3IONS-SCNC: 9 MMOL/L (ref 4–13)
BUN SERPL-MCNC: 17 MG/DL (ref 5–25)
CALCIUM SERPL-MCNC: 9.5 MG/DL (ref 8.3–10.1)
CHLORIDE SERPL-SCNC: 111 MMOL/L (ref 100–108)
CO2 SERPL-SCNC: 23 MMOL/L (ref 21–32)
CREAT SERPL-MCNC: 2.22 MG/DL (ref 0.6–1.3)
GFR SERPL CREATININE-BSD FRML MDRD: 24 ML/MIN/1.73SQ M
GLUCOSE SERPL-MCNC: 97 MG/DL (ref 65–140)
POTASSIUM SERPL-SCNC: 3.9 MMOL/L (ref 3.5–5.3)
SODIUM SERPL-SCNC: 143 MMOL/L (ref 136–145)

## 2020-02-09 PROCEDURE — 80048 BASIC METABOLIC PNL TOTAL CA: CPT | Performed by: INTERNAL MEDICINE

## 2020-02-09 PROCEDURE — 99238 HOSP IP/OBS DSCHRG MGMT 30/<: CPT | Performed by: PHYSICIAN ASSISTANT

## 2020-02-09 RX ADMIN — CLONAZEPAM 0.5 MG: 0.5 TABLET ORAL at 08:25

## 2020-02-09 RX ADMIN — HEPARIN SODIUM 5000 UNITS: 5000 INJECTION INTRAVENOUS; SUBCUTANEOUS at 06:39

## 2020-02-09 RX ADMIN — FLUOCINONIDE: 0.5 OINTMENT TOPICAL at 08:26

## 2020-02-09 RX ADMIN — ASPIRIN 81 MG: 81 TABLET, COATED ORAL at 08:25

## 2020-02-09 RX ADMIN — FLUVOXAMINE MALEATE 100 MG: 100 TABLET ORAL at 08:26

## 2020-02-09 RX ADMIN — CARVEDILOL 3.12 MG: 3.12 TABLET, FILM COATED ORAL at 08:25

## 2020-02-09 RX ADMIN — TRAMADOL HYDROCHLORIDE 50 MG: 50 TABLET, FILM COATED ORAL at 08:27

## 2020-02-09 RX ADMIN — PANTOPRAZOLE SODIUM 40 MG: 40 TABLET, DELAYED RELEASE ORAL at 06:38

## 2020-02-09 RX ADMIN — ACETAMINOPHEN 650 MG: 325 TABLET ORAL at 02:17

## 2020-02-09 RX ADMIN — ATORVASTATIN CALCIUM 40 MG: 40 TABLET, FILM COATED ORAL at 08:25

## 2020-02-09 RX ADMIN — QUETIAPINE FUMARATE 300 MG: 300 TABLET ORAL at 08:26

## 2020-02-09 NOTE — ASSESSMENT & PLAN NOTE
· Pt with K+ 2 7 in setting of CKD IV on admission  Most likely secondary to GI illness  Repleted with 40meq KCL IVPB    · Resolved

## 2020-02-09 NOTE — ASSESSMENT & PLAN NOTE
· Pt with 2 day hx of nausea/vomiting     · Patient tolerating regular diet, no further episodes of diarrhea  · Patient does have history of laxative abuse

## 2020-02-09 NOTE — DISCHARGE INSTRUCTIONS
Hypokalemia   WHAT YOU NEED TO KNOW:   Hypokalemia is a low level of potassium in your blood  Potassium helps control how your muscles, heart, and digestive system work  Hypokalemia occurs when your body loses too much potassium or does not absorb enough from food  DISCHARGE INSTRUCTIONS:   Return to the emergency department if:   · You cannot move your arm or leg      · You have a fast or irregular heartbeat      · You are too tired or weak to stand up  Contact your healthcare provider if:   · You are vomiting, or you have diarrhea      · You have numbness or tingling in your arms or legs      · Your symptoms do not go away or they get worse      · You have questions or concerns about your condition or care  Medicines:   · Potassium will be given to bring your potassium levels back to normal      · Take your medicine as directed  Contact your healthcare provider if you think your medicine is not helping or if you have side effects  Tell him of her if you are allergic to any medicine  Keep a list of the medicines, vitamins, and herbs you take  Include the amounts, and when and why you take them  Bring the list or the pill bottles to follow-up visits  Carry your medicine list with you in case of an emergency  Eat foods that are high in potassium: Foods that are high in potassium include bananas, oranges, tomatoes, potatoes, and avocado  [place] beans, turkey, salmon, lean beef, yogurt, and milk are also high in potassium  Ask your healthcare provider or dietitian for more information about foods that are high in potassium  Follow up with your healthcare provider as directed: Write down your questions so you remember to ask them during your visits  © 2017 [employer]  Information is for End User's use only and may not be sold, redistributed or otherwise used for commercial purposes   All illustrations and images included in CareNotes® are the copyrighted property of [employer]  or [employer]  The above information is an  only  It is not intended as medical advice for individual conditions or treatments   Talk to your doctor, nurse or pharmacist before following any medical regimen to see if it is safe and effective for you

## 2020-02-09 NOTE — PLAN OF CARE
Problem: Potential for Falls  Goal: Patient will remain free of falls  Description  INTERVENTIONS:  - Assess patient frequently for physical needs  -  Identify cognitive and physical deficits and behaviors that affect risk of falls    -  [place] fall precautions as indicated by assessment   - Educate patient/family on patient safety including physical limitations  - Instruct patient to call for assistance with activity based on assessment  - Modify environment to reduce risk of injury  - Consider OT/PT consult to assist with strengthening/mobility  2/9/2020 0842 by Claudia Manzanares RN  Outcome: Adequate for Discharge  2/9/2020 0841 by Claudia Manzanares RN  Outcome: Progressing     Problem: PAIN - ADULT  Goal: Verbalizes/displays adequate comfort level or baseline comfort level  Description  Interventions:  - Encourage patient to monitor pain and request assistance  - Assess pain using appropriate pain scale  - Administer analgesics based on type and severity of pain and evaluate response  - Implement non-pharmacological measures as appropriate and evaluate response  - Consider cultural and social influences on pain and pain management  - Notify physician/advanced practitioner if interventions unsuccessful or patient reports new pain  2/9/2020 0842 by Claudia Manzanares RN  Outcome: Adequate for Discharge  2/9/2020 0841 by Claudia Manzanares RN  Outcome: Progressing     Problem: INFECTION - ADULT  Goal: Absence or prevention of progression during hospitalization  Description  INTERVENTIONS:  - Assess and monitor for signs and symptoms of infection  - Monitor lab/diagnostic results  - Monitor all insertion sites, i e  indwelling lines, tubes, and drains  - Monitor endotracheal if appropriate and nasal secretions for changes in amount and color  - [place] appropriate cooling/warming therapies per order  - Administer medications as ordered  - Instruct and encourage patient and family to use good hand hygiene technique  - Identify and instruct in appropriate isolation precautions for identified infection/condition  2/9/2020 0842 by Ellis Webster RN  Outcome: Adequate for Discharge  2/9/2020 0841 by Ellis Webster RN  Outcome: Progressing     Problem: SAFETY ADULT  Goal: Maintain or return to baseline ADL function  Description  INTERVENTIONS:  -  Assess patient's ability to carry out ADLs; assess patient's baseline for ADL function and identify physical deficits which impact ability to perform ADLs (bathing, care of mouth/teeth, toileting, grooming, dressing, etc )  - Assess/evaluate cause of self-care deficits   - Assess range of motion  - Assess patient's mobility; develop plan if impaired  - Assess patient's need for assistive devices and provide as appropriate  - Encourage maximum independence but intervene and supervise when necessary  - Involve family in performance of ADLs  - Assess for home care needs following discharge   - Consider OT consult to assist with ADL evaluation and planning for discharge  - Provide patient education as appropriate  2/9/2020 0842 by Ellis Webster RN  Outcome: Adequate for Discharge  2/9/2020 0841 by Ellis Webster RN  Outcome: Progressing  Goal: Maintain or return mobility status to optimal level  Description  INTERVENTIONS:  - Assess patient's baseline mobility status (ambulation, transfers, stairs, etc )    - Identify cognitive and physical deficits and behaviors that affect mobility  - Identify mobility aids required to assist with transfers and/or ambulation (gait belt, sit-to-stand, lift, walker, cane, etc )  - [place] fall precautions as indicated by assessment  - Record patient progress and toleration of activity level on Mobility SBAR; progress patient to next Phase/Stage  - Instruct patient to call for assistance with activity based on assessment  - Consider rehabilitation consult to assist with strengthening/weightbearing, etc   2/9/2020 0842 by Ellis Webster RN  Outcome: Adequate for Discharge  2/9/2020 0841 by Jan Espino, RN  Outcome: Progressing

## 2020-02-09 NOTE — ASSESSMENT & PLAN NOTE
Pt with longstanding hx of bipolar disorder  Has been on medications since she was a child  Seeing Dr Theresa Limon and tyree for therapy at [facility]    Continue home meds- which I reviewed at length with patient

## 2020-02-09 NOTE — DISCHARGE INSTR - AVS FIRST PAGE
· Follow up with your PCP within 1 week  · Follow up with nephrology within 1-2 weeks  · You can continue to use your home medication of zofran as needed for nausea  Please return to the emergency department for further evaluation should you experience chest pain/palpitations, shortness of breath, nausea/vomiting, abdominal pain, fever/chills

## 2020-02-09 NOTE — DISCHARGE SUMMARY
Discharge- Parish Sandra 1960, 64 y o  female MRN: [id number]    Unit/Bed#: -01 Encounter: [id number]    Primary Care Provider: Ritu Can DO   Date and time admitted to hospital: 2/6/2020 10:43 AM    * Hypokalemiaresolved as of 2/9/2020  Assessment & Plan  · Pt with K+ 2 7 in setting of CKD IV on admission  Most likely secondary to GI illness  Repleted with 40meq KCL IVPB  · Resolved        CKD (chronic kidney disease) stage 4, GFR 15-29 ml/min ([facility])  Assessment & Plan  · Pt with hx of CKD stage IV  Most likely secondary to lithium use in her 20's  Pt follows with neprhology, Dr Chuy Puckett  Last visit 12/26/19  Pt's Cr fluctuates and is anywhere between 1 9 to 2 4, estimated GFR between 22 and 28 mL/minute  · Pt's cr 2 7 on presentation to ED this AM   Improved to 1 9 with hydration  · Pt currently makes urine  Has not required HD  However, pt has AV fistula in place in LUE  · Nephrology consult placed  Recs appreciated  Follow up as an outpatient  · Creatinine stable on discharge at 2 22      Bipolar 1 disorder, depressed, severe ([facility] [address] )  Assessment & Plan  Pt with longstanding hx of bipolar disorder  Has been on medications since she was a child  Seeing Dr Esvin Longoria for therapy at [email or web address]  Continue home meds- which I reviewed at length with patient    Gastroenteritisresolved as of 2/9/2020  Assessment & Plan  · Pt with 2 day hx of nausea/vomiting     · Patient tolerating regular diet, no further episodes of diarrhea  · Patient does have history of laxative abuse    Discharging Physician / Practitioner: Carlito Farr PA-C  PCP: Ritu Can DO  Admission Date:   Admission Orders (From admission, onward)     Ordered        02/07/20 1532  Inpatient Admission  Once         02/06/20 1538  Place in Observation (expected length of stay for this patient is less than two midnights)  Once                   Discharge Date: 02/09/20    Resolved Problems  Date Reviewed: 2/9/2020 Resolved    * (Principal) Hypokalemia 2/9/2020     Resolved by  Zeeshan Mckeon PA-C    Gastroenteritis 2/9/2020     Resolved by  Zeeshan Mckeon PA-C          Consultations During Hospital Stay:  · Nephrology    Procedures Performed:   · None    Significant Findings / Test Results:   · CT abdomen/pelvis: Mild air fluid distention of the right hemicolon to the level of the splenic flexure without discrete obstructing abnormality, wall thickening or inflammation evident  Grossly stable partially exophytic nodule left lower pole  · BMP with potassium 2 7 on admission, creatinine 1 94  · UA with large leukocytes     Incidental Findings:   · As above     Test Results Pending at Discharge (will require follow up): · None     Outpatient Tests Requested:  · None    Complications:  Uncomplicated hospital course    Reason for Admission:  Hypokalemia    Hospital Course:     Renate Reynolds is a 64 y o  female patient who originally presented to the hospital on 2/6/2020 due to nausea and vomiting  Past medical history significant for chronic kidney disease, bipolar disorder  Patient presented emergency department on 02/06/2020 with complaint of 2 day history of nausea and bilious vomiting  Patient at that time was unable to tolerate diet  Workup in the emergency department revealed severely low potassium of 2 7  Patient received potassium supplementation was evaluated by Nephrology  Patient received supportive care including IV fluids and Zofran for likely gastroenteritis  Patient continued to improve clinically and on day of discharge was tolerating regular diet and no further episodes of diarrhea  Patient verbalized understanding of requested outpatient follow-up  Please see above list of diagnoses and related plan for additional information       Condition at Discharge: stable     Discharge Day Visit / Exam:     Subjective:  "I feel good"  Vitals: Blood Pressure: 114/54 (02/09/20 4109)  Pulse: 67 (02/09/20 0657)  Temperature: 97 7 °F (36 5 °C) (02/09/20 0657)  Temp Source: Oral (02/08/20 2259)  Respirations: 16 (02/09/20 0657)  Height: 5' 9 6" (176 8 cm) (02/06/20 1637)  Weight - Scale: 85 5 kg (188 lb 8 oz) (02/09/20 0600)  SpO2: 97 % (02/09/20 0827)  Exam:   Physical Exam   Constitutional: She is oriented to person, place, and time  Vital signs are normal  She appears well-developed  Appears comfortable, no acute distress   HENT:   Head: Normocephalic  Eyes: Pupils are equal, round, and reactive to light  Conjunctivae and EOM are normal  No scleral icterus  Neck: Normal range of motion  Cardiovascular: Normal rate, regular rhythm and normal heart sounds  No murmur heard  Pulmonary/Chest: Effort normal and breath sounds normal  No respiratory distress  She has no wheezes  She has no rhonchi  She has no rales  Abdominal: Soft  Bowel sounds are normal  There is no tenderness  There is no rigidity, no rebound and no guarding  Musculoskeletal: She exhibits no edema, tenderness or deformity  Ambulating around room without difficulty   Neurological: She is alert and oriented to person, place, and time  Skin: Skin is warm and dry  Psychiatric: She has a normal mood and affect  Her speech is normal and behavior is normal    Nursing note and vitals reviewed  Discussion with Family:  Discussed with patient directly at bedside  Discharge instructions/Information to patient and family:   See after visit summary for information provided to patient and family  Provisions for Follow-Up Care:  See after visit summary for information related to follow-up care and any pertinent home health orders  Disposition:     Home    For Discharges to [facility] SNF:   · Not Applicable to this Patient - Not Applicable to this Patient    Planned Readmission:  None     Discharge Statement:  I spent 28 minutes discharging the patient   This time was spent on the day of discharge  I had direct contact with the patient on the day of discharge  Greater than 50% of the total time was spent examining patient, answering all patient questions, arranging and discussing plan of care with patient as well as directly providing post-discharge instructions  Additional time then spent on discharge activities  Discharge Medications:  See after visit summary for reconciled discharge medications provided to patient and family        ** Please Note: This note has been constructed using a voice recognition system **

## 2020-02-09 NOTE — ASSESSMENT & PLAN NOTE
· Pt with hx of CKD stage IV  Most likely secondary to lithium use in her 20's  Pt follows with neprhology, Dr Fuad Maynard  Last visit 12/26/19  Pt's Cr fluctuates and is anywhere between 1 9 to 2 4, estimated GFR between 22 and 28 mL/minute  · Pt's cr 2 7 on presentation to ED this AM   Improved to 1 9 with hydration  · Pt currently makes urine  Has not required HD  However, pt has AV fistula in place in LUE  · Nephrology consult placed  Recs appreciated  Follow up as an outpatient    · Creatinine stable on discharge at 2 22

## 2020-02-11 ENCOUNTER — TRANSITIONAL CARE MANAGEMENT (OUTPATIENT)
Dept: FAMILY MEDICINE CLINIC | Facility: HOSPITAL | Age: 60
End: 2020-02-11

## 2020-02-12 DIAGNOSIS — N28.1 RENAL CYST: ICD-10-CM

## 2020-02-12 DIAGNOSIS — E23.2 DIABETES INSIPIDUS (HCC): ICD-10-CM

## 2020-02-12 DIAGNOSIS — N25.81 SECONDARY RENAL HYPERPARATHYROIDISM (HCC): ICD-10-CM

## 2020-02-12 DIAGNOSIS — I10 ESSENTIAL HYPERTENSION: ICD-10-CM

## 2020-02-12 DIAGNOSIS — N18.30 CHRONIC KIDNEY DISEASE, STAGE 3 (HCC): ICD-10-CM

## 2020-02-12 RX ORDER — CARVEDILOL 3.12 MG/1
TABLET ORAL
Qty: 60 TABLET | Refills: 5 | Status: SHIPPED | OUTPATIENT
Start: 2020-02-12 | End: 2020-08-20 | Stop reason: SDUPTHER

## 2020-02-13 ENCOUNTER — TELEPHONE (OUTPATIENT)
Dept: NEPHROLOGY | Facility: CLINIC | Age: 60
End: 2020-02-13

## 2020-02-13 NOTE — TELEPHONE ENCOUNTER
Called her eating and drinking OK, medications reviewed she has blood work scheduled and f/u in march we can keep this appointment if worsening she can come in to ED for evaluation  Thanks

## 2020-02-13 NOTE — TELEPHONE ENCOUNTER
Per Margie from Dr Evelyne Vaughn, he will be calling her in a few minutes however "if it is bad have her report to ED"  I called and spoke with Kraig Ganser to make her aware  She does not want to go to the ED and will wait for a call from Dr Evelyne Vaughn

## 2020-02-13 NOTE — TELEPHONE ENCOUNTER
The patient had called and stated that she was recently in  [address] from 2/6/2020-2/9/2020  She states that ever since she left the hospital she is unable to keep anything down  She has no other symptoms at this time  She is concerned and would like for someone to call her ASAP        Aurelia Cosby MA

## 2020-02-17 ENCOUNTER — TELEPHONE (OUTPATIENT)
Dept: FAMILY MEDICINE CLINIC | Facility: HOSPITAL | Age: 60
End: 2020-02-17

## 2020-02-17 NOTE — TELEPHONE ENCOUNTER
Pt was in the hospital from 2/6 to 2/9/20  She is still feeling weak, woozy and can't keep food down  She can be reached at [phone]

## 2020-02-19 ENCOUNTER — TELEPHONE (OUTPATIENT)
Dept: FAMILY MEDICINE CLINIC | Facility: HOSPITAL | Age: 60
End: 2020-02-19

## 2020-02-19 ENCOUNTER — APPOINTMENT (OUTPATIENT)
Dept: LAB | Facility: HOSPITAL | Age: 60
End: 2020-02-19
Attending: INTERNAL MEDICINE
Payer: MEDICARE

## 2020-02-19 ENCOUNTER — OFFICE VISIT (OUTPATIENT)
Dept: FAMILY MEDICINE CLINIC | Facility: HOSPITAL | Age: 60
End: 2020-02-19
Payer: MEDICARE

## 2020-02-19 VITALS
TEMPERATURE: 98.9 F | DIASTOLIC BLOOD PRESSURE: 84 MMHG | HEIGHT: 70 IN | SYSTOLIC BLOOD PRESSURE: 148 MMHG | HEART RATE: 87 BPM | BODY MASS INDEX: 26.66 KG/M2 | OXYGEN SATURATION: 96 % | WEIGHT: 186.2 LBS

## 2020-02-19 DIAGNOSIS — R74.8 ELEVATED ALKALINE PHOSPHATASE LEVEL: ICD-10-CM

## 2020-02-19 DIAGNOSIS — M46.1 BILATERAL SACROILIITIS (HCC): ICD-10-CM

## 2020-02-19 DIAGNOSIS — T82.898A STEAL SYNDROME DIALYSIS VASCULAR ACCESS, INITIAL ENCOUNTER (HCC): ICD-10-CM

## 2020-02-19 DIAGNOSIS — R11.2 NON-INTRACTABLE VOMITING WITH NAUSEA, UNSPECIFIED VOMITING TYPE: Primary | ICD-10-CM

## 2020-02-19 LAB
ALBUMIN SERPL BCP-MCNC: 3.9 G/DL (ref 3.5–5)
ALP SERPL-CCNC: 231 U/L (ref 46–116)
ALT SERPL W P-5'-P-CCNC: 20 U/L (ref 12–78)
ANION GAP SERPL CALCULATED.3IONS-SCNC: 8 MMOL/L (ref 4–13)
AST SERPL W P-5'-P-CCNC: 16 U/L (ref 5–45)
BASOPHILS # BLD AUTO: 0.09 THOUSANDS/ΜL (ref 0–0.1)
BASOPHILS NFR BLD AUTO: 1 % (ref 0–1)
BILIRUB SERPL-MCNC: 0.26 MG/DL (ref 0.2–1)
BUN SERPL-MCNC: 16 MG/DL (ref 5–25)
CALCIUM SERPL-MCNC: 9.6 MG/DL (ref 8.3–10.1)
CHLORIDE SERPL-SCNC: 113 MMOL/L (ref 100–108)
CO2 SERPL-SCNC: 20 MMOL/L (ref 21–32)
CREAT SERPL-MCNC: 2.37 MG/DL (ref 0.6–1.3)
EOSINOPHIL # BLD AUTO: 0.67 THOUSAND/ΜL (ref 0–0.61)
EOSINOPHIL NFR BLD AUTO: 9 % (ref 0–6)
ERYTHROCYTE [DISTWIDTH] IN BLOOD BY AUTOMATED COUNT: 13.7 % (ref 11.6–15.1)
GFR SERPL CREATININE-BSD FRML MDRD: 22 ML/MIN/1.73SQ M
GLUCOSE SERPL-MCNC: 97 MG/DL (ref 65–140)
HCT VFR BLD AUTO: 39.1 % (ref 34.8–46.1)
HGB BLD-MCNC: 12.3 G/DL (ref 11.5–15.4)
IMM GRANULOCYTES # BLD AUTO: 0.02 THOUSAND/UL (ref 0–0.2)
IMM GRANULOCYTES NFR BLD AUTO: 0 % (ref 0–2)
LYMPHOCYTES # BLD AUTO: 1.69 THOUSANDS/ΜL (ref 0.6–4.47)
LYMPHOCYTES NFR BLD AUTO: 23 % (ref 14–44)
MCH RBC QN AUTO: 32.1 PG (ref 26.8–34.3)
MCHC RBC AUTO-ENTMCNC: 31.5 G/DL (ref 31.4–37.4)
MCV RBC AUTO: 102 FL (ref 82–98)
MONOCYTES # BLD AUTO: 0.53 THOUSAND/ΜL (ref 0.17–1.22)
MONOCYTES NFR BLD AUTO: 7 % (ref 4–12)
NEUTROPHILS # BLD AUTO: 4.25 THOUSANDS/ΜL (ref 1.85–7.62)
NEUTS SEG NFR BLD AUTO: 60 % (ref 43–75)
NRBC BLD AUTO-RTO: 0 /100 WBCS
PLATELET # BLD AUTO: 241 THOUSANDS/UL (ref 149–390)
PMV BLD AUTO: 12.2 FL (ref 8.9–12.7)
POTASSIUM SERPL-SCNC: 3.9 MMOL/L (ref 3.5–5.3)
PROT SERPL-MCNC: 7.2 G/DL (ref 6.4–8.2)
RBC # BLD AUTO: 3.83 MILLION/UL (ref 3.81–5.12)
SODIUM SERPL-SCNC: 141 MMOL/L (ref 136–145)
WBC # BLD AUTO: 7.25 THOUSAND/UL (ref 4.31–10.16)

## 2020-02-19 PROCEDURE — 80053 COMPREHEN METABOLIC PANEL: CPT

## 2020-02-19 PROCEDURE — 99495 TRANSJ CARE MGMT MOD F2F 14D: CPT | Performed by: INTERNAL MEDICINE

## 2020-02-19 PROCEDURE — 36415 COLL VENOUS BLD VENIPUNCTURE: CPT

## 2020-02-19 PROCEDURE — 85025 COMPLETE CBC W/AUTO DIFF WBC: CPT

## 2020-02-19 RX ORDER — ONDANSETRON 2 MG/ML
4 INJECTION INTRAMUSCULAR; INTRAVENOUS EVERY 6 HOURS PRN
Status: DISCONTINUED | OUTPATIENT
Start: 2020-02-19 | End: 2020-02-19

## 2020-02-19 RX ORDER — ONDANSETRON 4 MG/1
4 TABLET, ORALLY DISINTEGRATING ORAL ONCE
Status: DISCONTINUED | OUTPATIENT
Start: 2020-02-19 | End: 2020-02-23

## 2020-02-19 NOTE — TELEPHONE ENCOUNTER
Patient seen today, she states that medication was supposed to be sent to [address] [place] [address] for her stomach issues  I don't see any prescriptions that were sent today  Please advise

## 2020-02-19 NOTE — PROGRESS NOTES
Assessment/Plan:             Problem List Items Addressed This Visit        Musculoskeletal and Integument    Bilateral sacroiliitis ([facility] [address] )      Other Visit Diagnoses     Non-intractable vomiting with nausea, unspecified vomiting type    -  Primary    Relevant Medications    ondansetron (ZOFRAN-ODT) dispersible tablet 4 mg    Other Relevant Orders    Ambulatory referral to Gastroenterology    Elevated alkaline phosphatase level        Relevant Orders    Comprehensive metabolic panel (Completed)    CBC and differential (Completed)    Steal syndrome dialysis vascular access, initial encounter ([facility] [address] )                Subjective:      Patient ID: José Miguel Hodge is a 64 y o  female    Here for MARLYS visit  1  Nausea with vomiting- was admitted from 2/4 to 2/9 and had vomiting  Now doing it 2x day  Had robert and low potassium  Was seen by GI- had ct    restarted with diarrhea yesterday  Had omelette this am and has had vomitng with 1/2 can of soup  Took sine sven   she is feeling wiped out and needed to find somewhere to set   Has had diarrhea 2 xz today  Study Result     CT ABDOMEN AND PELVIS WITHOUT IV CONTRAST     INDICATION:   vomiting, hx of CKD      COMPARISON:  October 2019     TECHNIQUE:  CT examination of the abdomen and pelvis was performed without intravenous contrast   Axial, sagittal, and coronal 2D reformatted images were created from the source data and submitted for interpretation       Radiation dose length product (DLP) for this visit:  354 mGy-cm   This examination, like all CT scans performed in the [facility], was performed utilizing techniques to minimize radiation dose exposure, including the use of iterative   reconstruction and automated exposure control       Enteric contrast was not administered       FINDINGS:     ABDOMEN     LOWER CHEST:  Nonspecific airspace infiltrate in the left lower lobe with slightly greater component in the dependent aspects    Suspicious for combination of acute infiltrate as well as atelectasis  No pleural fluid collection      LIVER/BILIARY TREE:  Unremarkable      GALLBLADDER:  No calcified gallstones  No pericholecystic inflammatory change      SPLEEN:  Unremarkable      PANCREAS:  Unremarkable      ADRENAL GLANDS:  Unremarkable      KIDNEYS/URETERS:  Stable small nonobstructing calcification in the right lower pole  Focal lobulated nodule partially exophytic from the left lower pole, 1 6 cm diameter, without suspicious change compared to priors  Cannot better characterize on this   unenhanced exam      STOMACH AND BOWEL:  Prior anastomotic staple line seen in the rectosigmoid region  Air fluid distention of the right hemicolon seen to the level of the splenic flexure but without discrete obstructing abnormality  There is also no evidence for wall   thickening     APPENDIX:  No findings to suggest appendicitis      ABDOMINOPELVIC CAVITY:  No ascites or free intraperitoneal air  No lymphadenopathy      VESSELS:  Unremarkable for patient's age      PELVIS     REPRODUCTIVE ORGANS:  Unremarkable for patient's age      URINARY BLADDER:  Unremarkable      ABDOMINAL WALL/INGUINAL REGIONS:  Unremarkable      OSSEOUS STRUCTURES:  No acute fracture or destructive osseous lesion      IMPRESSION:     Mild air fluid distention of the right hemicolon to the level of the splenic flexure without discrete obstructing abnormality, wall thickening or inflammation evident      Grossly stable partially exophytic nodule left lower pole                   The following portions of the patient's history were reviewed and updated as appropriate: allergies, current medications and problem list      Review of Systems   Constitutional: Negative for fever  Neurological: Negative for dizziness  All other systems reviewed and are negative          Objective:      Current Outpatient Medications:     acetaminophen (TYLENOL) 325 mg tablet, Take 650 mg by mouth every 6 (six) hours as needed for mild pain, Disp: , Rfl:     albuterol (PROAIR HFA) 90 mcg/act inhaler, Inhale 2 puffs every 6 (six) hours as needed for wheezing, Disp: 1 Inhaler, Rfl: 5    AMILoride 5 mg tablet, Take 5 mg by mouth 2 (two) times a day, Disp: , Rfl: 3    aspirin (ECOTRIN LOW STRENGTH) 81 mg EC tablet, Take 1 tablet (81 mg total) by mouth daily, Disp: 30 tablet, Rfl: 0    atorvastatin (LIPITOR) 40 mg tablet, Take 1 tablet (40 mg total) by mouth daily, Disp: 30 tablet, Rfl: 5    budesonide-formoterol (SYMBICORT) 160-4 5 mcg/act inhaler, Inhale 2 puffs 2 (two) times a day Rinse mouth after use , Disp: 3 Inhaler, Rfl: 3    carvedilol (COREG) 3 125 mg tablet, TAKE 1 TABLET BY MOUTH TWICE DAILY WITH MEALS, Disp: 60 tablet, Rfl: 5    fluocinonide (LIDEX) 0 05 % ointment, Apply topically 2 (two) times a day, Disp: 60 g, Rfl: 1    fluvoxaMINE (LUVOX) 100 mg tablet, Take 100 mg by mouth 3 (three) times a day  , Disp: , Rfl:     lamoTRIgine (LaMICtal) 100 mg tablet, Take 400 mg by mouth daily at bedtime , Disp: , Rfl:     Polyethylene Glycol 3350 (MIRALAX PO), Take by mouth as needed , Disp: , Rfl:     QUEtiapine (SEROQUEL) 300 mg tablet, Take 1 tablet (300 mg total) by mouth every morning 1 in the AM     ( also takes 400 mg at bedtime), Disp: 90 tablet, Rfl: 3    QUEtiapine (SEROquel) 400 MG tablet, 1 at bedtime, Disp: , Rfl:     traMADol (ULTRAM) 50 mg tablet, Take 1 tablet (50 mg total) by mouth 2 (two) times a day, Disp: 20 tablet, Rfl: 0    clonazePAM (KlonoPIN) 0 5 mg tablet, Take 1 tablet (0 5 mg total) by mouth 3 (three) times a day, Disp: 270 tablet, Rfl: 0    linaCLOtide (LINZESS) 290 MCG CAPS, Take 1 capsule by mouth daily for 90 days, Disp: 90 capsule, Rfl: 3    omeprazole (PriLOSEC) 40 MG capsule, Take 1 capsule (40 mg total) by mouth daily at bedtime, Disp: 90 capsule, Rfl: 3    ondansetron (ZOFRAN) 4 mg tablet, Take 1 tablet (4 mg total) by mouth every 8 (eight) hours as needed for nausea or vomiting for up to 4 days, Disp: 12 tablet, Rfl: 0    Current Facility-Administered Medications:     ondansetron (ZOFRAN-ODT) dispersible tablet 4 mg, 4 mg, Oral, Once, Bette Fly, DO    Blood pressure 148/84, pulse 87, temperature 98 9 °F (37 2 °C), temperature source Tympanic, height 5' 9 6" (1 768 m), weight 84 5 kg (186 lb 3 2 oz), SpO2 96 %, not currently breastfeeding  Physical Exam   Constitutional: She appears well-developed and well-nourished  Eyes: Right eye exhibits no discharge  Left eye exhibits no discharge  Neck: No thyromegaly present  Cardiovascular: Regular rhythm and normal heart sounds  Pulmonary/Chest: Breath sounds normal  She has no wheezes  She has no rales  Abdominal: Soft  There is tenderness  midepigastric and ruq tenderness  No guarding   Musculoskeletal: She exhibits no edema  Tenderness over sacroiliac joints- right greater than left   Lymphadenopathy:     She has cervical adenopathy  Nursing note and vitals reviewed

## 2020-02-20 DIAGNOSIS — K21.9 GASTROESOPHAGEAL REFLUX DISEASE, ESOPHAGITIS PRESENCE NOT SPECIFIED: ICD-10-CM

## 2020-02-20 DIAGNOSIS — F31.81 BIPOLAR 2 DISORDER (HCC): Chronic | ICD-10-CM

## 2020-02-20 DIAGNOSIS — R11.2 NAUSEA AND VOMITING, INTRACTABILITY OF VOMITING NOT SPECIFIED, UNSPECIFIED VOMITING TYPE: ICD-10-CM

## 2020-02-20 RX ORDER — ONDANSETRON 4 MG/1
4 TABLET, FILM COATED ORAL EVERY 8 HOURS PRN
Qty: 12 TABLET | Refills: 0 | Status: SHIPPED | OUTPATIENT
Start: 2020-02-20 | End: 2020-03-18 | Stop reason: SDUPTHER

## 2020-02-20 RX ORDER — CLONAZEPAM 0.5 MG/1
0.5 TABLET ORAL 3 TIMES DAILY
Qty: 270 TABLET | Refills: 0 | Status: SHIPPED | OUTPATIENT
Start: 2020-02-20 | End: 2020-05-11 | Stop reason: SDUPTHER

## 2020-02-20 RX ORDER — OMEPRAZOLE 40 MG/1
40 CAPSULE, DELAYED RELEASE ORAL
Qty: 90 CAPSULE | Refills: 3 | Status: SHIPPED | OUTPATIENT
Start: 2020-02-20 | End: 2020-03-18 | Stop reason: SDUPTHER

## 2020-02-21 ENCOUNTER — TELEPHONE (OUTPATIENT)
Dept: FAMILY MEDICINE CLINIC | Facility: HOSPITAL | Age: 60
End: 2020-02-21

## 2020-02-23 ENCOUNTER — HOSPITAL ENCOUNTER (EMERGENCY)
Facility: HOSPITAL | Age: 60
Discharge: HOME | End: 2020-02-23
Attending: EMERGENCY MEDICINE
Payer: MEDICARE

## 2020-02-23 VITALS
SYSTOLIC BLOOD PRESSURE: 135 MMHG | RESPIRATION RATE: 16 BRPM | HEIGHT: 67 IN | WEIGHT: 181 LBS | HEART RATE: 86 BPM | BODY MASS INDEX: 28.41 KG/M2 | DIASTOLIC BLOOD PRESSURE: 69 MMHG | TEMPERATURE: 98.8 F | OXYGEN SATURATION: 98 %

## 2020-02-23 DIAGNOSIS — M51.27 HERNIATED NUCLEUS PULPOSUS, L5-S1: ICD-10-CM

## 2020-02-23 DIAGNOSIS — E86.0 DEHYDRATION: ICD-10-CM

## 2020-02-23 DIAGNOSIS — R19.7 NAUSEA VOMITING AND DIARRHEA: Primary | ICD-10-CM

## 2020-02-23 DIAGNOSIS — N28.9 RENAL INSUFFICIENCY: ICD-10-CM

## 2020-02-23 DIAGNOSIS — R11.2 NAUSEA VOMITING AND DIARRHEA: Primary | ICD-10-CM

## 2020-02-23 LAB
ALBUMIN SERPL BCP-MCNC: 3.9 G/DL (ref 3.5–5)
ALP SERPL-CCNC: 244 U/L (ref 46–116)
ALT SERPL W P-5'-P-CCNC: 21 U/L (ref 12–78)
ANION GAP SERPL CALCULATED.3IONS-SCNC: 15 MMOL/L (ref 4–13)
AST SERPL W P-5'-P-CCNC: 28 U/L (ref 5–45)
BASOPHILS # BLD AUTO: 0.08 THOUSANDS/ΜL (ref 0–0.1)
BASOPHILS NFR BLD AUTO: 1 % (ref 0–1)
BILIRUB SERPL-MCNC: 0.4 MG/DL (ref 0.2–1)
BUN SERPL-MCNC: 19 MG/DL (ref 5–25)
CALCIUM SERPL-MCNC: 9.3 MG/DL (ref 8.3–10.1)
CHLORIDE SERPL-SCNC: 106 MMOL/L (ref 100–108)
CLARITY, POC: CLEAR
CO2 SERPL-SCNC: 19 MMOL/L (ref 21–32)
COLOR, POC: YELLOW
CREAT SERPL-MCNC: 2.64 MG/DL (ref 0.6–1.3)
EOSINOPHIL # BLD AUTO: 0.53 THOUSAND/ΜL (ref 0–0.61)
EOSINOPHIL NFR BLD AUTO: 8 % (ref 0–6)
ERYTHROCYTE [DISTWIDTH] IN BLOOD BY AUTOMATED COUNT: 13.4 % (ref 11.6–15.1)
EXT BILIRUBIN, UA: NORMAL
EXT BLOOD URINE: NORMAL
EXT GLUCOSE, UA: NORMAL
EXT KETONES: NORMAL
EXT NITRITE, UA: NORMAL
EXT PH, UA: 6.5
EXT PROTEIN, UA: NORMAL
EXT SPECIFIC GRAVITY, UA: 1
EXT UROBILINOGEN: NORMAL
GFR SERPL CREATININE-BSD FRML MDRD: 19 ML/MIN/1.73SQ M
GLUCOSE SERPL-MCNC: 115 MG/DL (ref 65–140)
HCT VFR BLD AUTO: 41.1 % (ref 34.8–46.1)
HGB BLD-MCNC: 13 G/DL (ref 11.5–15.4)
IMM GRANULOCYTES # BLD AUTO: 0.02 THOUSAND/UL (ref 0–0.2)
IMM GRANULOCYTES NFR BLD AUTO: 0 % (ref 0–2)
LIPASE SERPL-CCNC: 185 U/L (ref 73–393)
LYMPHOCYTES # BLD AUTO: 1.47 THOUSANDS/ΜL (ref 0.6–4.47)
LYMPHOCYTES NFR BLD AUTO: 22 % (ref 14–44)
MCH RBC QN AUTO: 32 PG (ref 26.8–34.3)
MCHC RBC AUTO-ENTMCNC: 31.6 G/DL (ref 31.4–37.4)
MCV RBC AUTO: 101 FL (ref 82–98)
MONOCYTES # BLD AUTO: 0.57 THOUSAND/ΜL (ref 0.17–1.22)
MONOCYTES NFR BLD AUTO: 9 % (ref 4–12)
NEUTROPHILS # BLD AUTO: 3.88 THOUSANDS/ΜL (ref 1.85–7.62)
NEUTS SEG NFR BLD AUTO: 60 % (ref 43–75)
NRBC BLD AUTO-RTO: 0 /100 WBCS
PLATELET # BLD AUTO: 231 THOUSANDS/UL (ref 149–390)
PMV BLD AUTO: 10.8 FL (ref 8.9–12.7)
POTASSIUM SERPL-SCNC: 3.8 MMOL/L (ref 3.5–5.3)
PROT SERPL-MCNC: 7.5 G/DL (ref 6.4–8.2)
RBC # BLD AUTO: 4.06 MILLION/UL (ref 3.81–5.12)
SODIUM SERPL-SCNC: 140 MMOL/L (ref 136–145)
WBC # BLD AUTO: 6.55 THOUSAND/UL (ref 4.31–10.16)
WBC # BLD EST: NORMAL 10*3/UL

## 2020-02-23 PROCEDURE — 99284 EMERGENCY DEPT VISIT MOD MDM: CPT

## 2020-02-23 PROCEDURE — 99284 EMERGENCY DEPT VISIT MOD MDM: CPT | Performed by: EMERGENCY MEDICINE

## 2020-02-23 PROCEDURE — 83690 ASSAY OF LIPASE: CPT | Performed by: EMERGENCY MEDICINE

## 2020-02-23 PROCEDURE — 36415 COLL VENOUS BLD VENIPUNCTURE: CPT | Performed by: EMERGENCY MEDICINE

## 2020-02-23 PROCEDURE — 96374 THER/PROPH/DIAG INJ IV PUSH: CPT

## 2020-02-23 PROCEDURE — 81002 URINALYSIS NONAUTO W/O SCOPE: CPT | Performed by: EMERGENCY MEDICINE

## 2020-02-23 PROCEDURE — 80053 COMPREHEN METABOLIC PANEL: CPT | Performed by: EMERGENCY MEDICINE

## 2020-02-23 PROCEDURE — 85025 COMPLETE CBC W/AUTO DIFF WBC: CPT | Performed by: EMERGENCY MEDICINE

## 2020-02-23 PROCEDURE — 96361 HYDRATE IV INFUSION ADD-ON: CPT

## 2020-02-23 RX ORDER — ONDANSETRON 4 MG/1
4 TABLET, FILM COATED ORAL 3 TIMES DAILY PRN
Qty: 15 TABLET | Refills: 0 | Status: SHIPPED | OUTPATIENT
Start: 2020-02-23 | End: 2020-03-02 | Stop reason: SDUPTHER

## 2020-02-23 RX ORDER — ONDANSETRON 2 MG/ML
4 INJECTION INTRAMUSCULAR; INTRAVENOUS ONCE
Status: COMPLETED | OUTPATIENT
Start: 2020-02-23 | End: 2020-02-23

## 2020-02-23 RX ADMIN — SODIUM CHLORIDE 1000 ML: 0.9 INJECTION, SOLUTION INTRAVENOUS at 13:54

## 2020-02-23 RX ADMIN — ONDANSETRON 4 MG: 2 INJECTION INTRAMUSCULAR; INTRAVENOUS at 13:54

## 2020-02-23 NOTE — ED PROVIDER NOTES
History  Chief Complaint   Patient presents with    Diarrhea     Pt states that she has been throwing up all week and has had diarrhea for 2 days  States that she feels weak     This is a 16-year-old female who presents for evaluation of nausea vomiting and diarrhea  The diarrhea has been present for 2 days the vomiting for approximately 1 week with decreased p o  Intake no fevers chills denies any recent travel or antibiotic use she does have a history of kidney failure with the GFR of 22      History provided by:  Patient  Medical Problem   Location:  Generalized  Quality:  Weakness and fatigue  Severity:  Moderate  Onset quality:  Gradual  Duration:  1 week  Timing:  Constant  Progression:  Worsening  Chronicity:  Recurrent  Context:  Nausea vomiting diarrhea and generalized weakness  Worsened by:  Vomiting and diarrhea  Associated symptoms: abdominal pain (Crampy generalized), diarrhea, fatigue, nausea and vomiting        Prior to Admission Medications   Prescriptions Last Dose Informant Patient Reported? Taking?    AMILoride 5 mg tablet  Self Yes No   Sig: Take 5 mg by mouth 2 (two) times a day   Polyethylene Glycol 3350 (MIRALAX PO)  Self Yes No   Sig: Take by mouth as needed    QUEtiapine (SEROQUEL) 300 mg tablet  Self No No   Sig: Take 1 tablet (300 mg total) by mouth every morning 1 in the AM     ( also takes 400 mg at bedtime)   QUEtiapine (SEROquel) 400 MG tablet  Self Yes No   Si at bedtime   acetaminophen (TYLENOL) 325 mg tablet  Self Yes No   Sig: Take 650 mg by mouth every 6 (six) hours as needed for mild pain   albuterol (PROAIR HFA) 90 mcg/act inhaler Not Taking at Unknown time Self No No   Sig: Inhale 2 puffs every 6 (six) hours as needed for wheezing   Patient not taking: Reported on 2020   aspirin (ECOTRIN LOW STRENGTH) 81 mg EC tablet  Self No No   Sig: Take 1 tablet (81 mg total) by mouth daily   atorvastatin (LIPITOR) 40 mg tablet  Self No No   Sig: Take 1 tablet (40 mg total) by mouth daily   budesonide-formoterol (SYMBICORT) 160-4 5 mcg/act inhaler  Self No No   Sig: Inhale 2 puffs 2 (two) times a day Rinse mouth after use     carvedilol (COREG) 3 125 mg tablet   No No   Sig: TAKE 1 TABLET BY MOUTH TWICE DAILY WITH MEALS   clonazePAM (KlonoPIN) 0 5 mg tablet   No No   Sig: Take 1 tablet (0 5 mg total) by mouth 3 (three) times a day   fluocinonide (LIDEX) 0 05 % ointment  Self No No   Sig: Apply topically 2 (two) times a day   fluvoxaMINE (LUVOX) 100 mg tablet  Self Yes No   Sig: Take 100 mg by mouth 3 (three) times a day     lamoTRIgine (LaMICtal) 100 mg tablet  Self Yes No   Sig: Take 400 mg by mouth daily at bedtime    linaCLOtide (LINZESS) 290 MCG CAPS  Self No No   Sig: Take 1 capsule by mouth daily for 90 days   omeprazole (PriLOSEC) 40 MG capsule   No No   Sig: Take 1 capsule (40 mg total) by mouth daily at bedtime   ondansetron (ZOFRAN) 4 mg tablet   No No   Sig: Take 1 tablet (4 mg total) by mouth every 8 (eight) hours as needed for nausea or vomiting for up to 4 days   traMADol (ULTRAM) 50 mg tablet  Self No No   Sig: Take 1 tablet (50 mg total) by mouth 2 (two) times a day      Facility-Administered Medications: None       Past Medical History:   Diagnosis Date    Ankle sprain     left    Anxiety disorder     Bipolar 2 disorder ([facility])     Chronic back pain     CKD (chronic kidney disease) stage 3, GFR 30-59 ml/min ([facility])     stage 4 (as per pt)    CVA (cerebral vascular accident) ([facility] [address] )     noted on MRI in the past    Depression     GERD (gastroesophageal reflux disease)     Hypercholesteremia     Hyperlipidemia     Hypernatremia     Hypertension     Hypokalemia     Intervertebral disc disorder with radiculopathy of lumbosacral region     resolved: 12/28/2015    Kidney disease     Panic attacks     Pericardial effusion     PONV (postoperative nausea and vomiting)     Radiculitis     resolved: 12/28/2015    Secondary renal hyperparathyroidism ([facility])     Vitamin D deficiency        Past Surgical History:   Procedure Laterality Date    BUNIONECTOMY      Left foot     COLON SURGERY      COLONOSCOPY  2018    DILATION AND CURETTAGE OF UTERUS      INDUCED       surgically induced    [place] ANASTOMOSIS,AV,ANY SITE Left 2019    Procedure: CREATION FISTULA ARTERIOVENOUS (AV) left wrists possible left upper;  Surgeon: Nilsa Davey MD;  [facility];  Service: Vascular    [place] CORRJ HALLUX VALGUS W/SESMDC W/DIST Lanice Party Left 2019    Procedure: Lg Tijerina;  Surgeon: Bar Aviles DPM;  [facility];  Service: Podiatry    [place] ERCP DX COLLECTION SPECIMEN BRUSHING/WASHING N/A 2018    Procedure: ENDOSCOPIC RETROGRADE CHOLANGIOPANCREATOGRAPHY (ERCP);   Surgeon: Tanya Ratliff MD;  [facility];  Service: Gastroenterology    [place] LAP, SURG PROCTOPEXY N/A 2018    Procedure: ROBOTIC SIGMOID RESECTION / RECTOPEXY;  Surgeon: Babak Vargas MD;  [facility];  Service: Colorectal    [place] SIGMOIDOSCOPY FLX DX W/COLLJ SPEC BR/WA IF PFRMD N/A 2018    Procedure: Obed Brasher;  Surgeon: Babak Vargas MD;  [facility];  Service: Colorectal    TUBAL LIGATION Bilateral [facility] THYROID BIOPSY  2019       Family History   Problem Relation Age of Onset    Bipolar disorder Mother     Mental illness Mother         depression    Stroke Mother     Dementia Mother     Heart disease Father     Hypertension Father     Diabetes Father     Other Family         Back disorder    Diabetes Family     Heart disease Family     Hypertension Family     Breast cancer Family     Stroke Family     Thyroid disease Family     Breast cancer Paternal [de-identified]     Breast cancer Paternal [de-identified]     Breast cancer Maternal Aunt     Mental illness Sister     Mental illness Sister     Heart disease Sister     No Known Problems Sister     No Known Problems Sister    John Phillips Other Son pituitary tumor    Hypertension Son     Obesity Son     No Known Problems Son     Substance Abuse Neg Hx         neg fam hx     I have reviewed and agree with the history as documented  Social History     Tobacco Use    Smoking status: Never Smoker    Smokeless tobacco: Never Used   Substance Use Topics    Alcohol use: Never     Frequency: Never     Binge frequency: Never    Drug use: Not Currently     Types: Marijuana       Review of Systems   Constitutional: Positive for fatigue  Gastrointestinal: Positive for abdominal pain (Crampy generalized), diarrhea, nausea and vomiting  All other systems reviewed and are negative  Physical Exam  Physical Exam   Constitutional: She is oriented to person, place, and time  She appears well-developed  No distress  HENT:   Head: Normocephalic and atraumatic  Right Ear: External ear normal    Left Ear: External ear normal    Nose: Nose normal    Mouth/Throat: Oropharynx is clear and moist    Eyes: Pupils are equal, round, and reactive to light  EOM are normal  Right eye exhibits no discharge  Left eye exhibits no discharge  No scleral icterus  Neck: Neck supple  No JVD present  No tracheal deviation present  Cardiovascular: Normal rate, regular rhythm and intact distal pulses  Exam reveals no gallop and no friction rub  No murmur heard  Pulmonary/Chest: Effort normal and breath sounds normal  No stridor  No respiratory distress  She has no wheezes  She has no rales  Abdominal: Soft  Bowel sounds are normal  She exhibits no distension  There is no tenderness  There is no guarding  Musculoskeletal: Normal range of motion  She exhibits no edema, tenderness or deformity  Neurological: She is alert and oriented to person, place, and time  No cranial nerve deficit  She exhibits normal muscle tone  Coordination normal    Skin: Skin is warm and dry  She is not diaphoretic  Psychiatric: She has a normal mood and affect   Her behavior is normal  Thought content normal    Nursing note and vitals reviewed        Vital Signs  ED Triage Vitals [02/23/20 1220]   Temperature Pulse Respirations Blood Pressure SpO2   98 8 °F (37 1 °C) 98 18 117/64 96 %      Temp src Heart Rate Source Patient Position - Orthostatic VS BP Location FiO2 (%)   -- -- Sitting Left arm --      Pain Score       No Pain           Vitals:    02/23/20 1220   BP: 117/64   Pulse: 98   Patient Position - Orthostatic VS: Sitting         Visual Acuity      ED Medications  Medications   sodium chloride 0 9 % bolus 1,000 mL (1,000 mL Intravenous New Bag 2/23/20 1354)   ondansetron (ZOFRAN) injection 4 mg (4 mg Intravenous Given 2/23/20 1354)       Diagnostic Studies  Results Reviewed     Procedure Component Value Units Date/Time    POCT urinalysis dipstick [[id number]]  (Normal) Resulted:  02/23/20 1426    Lab Status:  Final result Specimen:  Urine Updated:  02/23/20 1427     Color, UA yellow     Clarity, UA clear     Glucose, UA (Ref: Negative) neg     Bilirubin, UA (Ref: Negative) neg     Ketones, UA (Ref: Negative) neg     Spec Grav, UA (Ref:1 003-1 030) 1 005     Blood, UA (Ref: Negative) neg     pH, UA (Ref: 4 5-8 0) 6 5     Protein, UA (Ref: Negative) neg     Urobilinogen, UA (Ref: 0 2- 1 0) neg      Leukocytes, UA (Ref: Negative) neg     Nitrite, UA (Ref: Negative) neg    Comprehensive metabolic panel [[id number]]  (Abnormal) Collected:  02/23/20 1348    Lab Status:  Final result Specimen:  Blood from Arm, Right Updated:  02/23/20 1412     Sodium 140 mmol/L      Potassium 3 8 mmol/L      Chloride 106 mmol/L      CO2 19 mmol/L      ANION GAP 15 mmol/L      BUN 19 mg/dL      Creatinine 2 64 mg/dL      Glucose 115 mg/dL      Calcium 9 3 mg/dL      AST 28 U/L      ALT 21 U/L      Alkaline Phosphatase 244 U/L      Total Protein 7 5 g/dL      Albumin 3 9 g/dL      Total Bilirubin 0 40 mg/dL      eGFR 19 ml/min/1 73sq m     Narrative:       [place] guidelines for Chronic Kidney Disease (CKD):     Stage 1 with normal or high GFR (GFR > 90 mL/min/1 73 square meters)    Stage 2 Mild CKD (GFR = 60-89 mL/min/1 73 square meters)    Stage 3A Moderate CKD (GFR = 45-59 mL/min/1 73 square meters)    Stage 3B Moderate CKD (GFR = 30-44 mL/min/1 73 square meters)    Stage 4 Severe CKD (GFR = 15-29 mL/min/1 73 square meters)    Stage 5 End Stage CKD (GFR <15 mL/min/1 73 square meters)  Note: GFR calculation is accurate only with a steady state creatinine    Lipase [[id number]]  (Normal) Collected:  02/23/20 1348    Lab Status:  Final result Specimen:  Blood from Arm, Right Updated:  02/23/20 1412     Lipase 185 u/L     CBC and differential [[id number]]  (Abnormal) Collected:  02/23/20 1348    Lab Status:  Final result Specimen:  Blood from Arm, Right Updated:  02/23/20 1353     WBC 6 55 Thousand/uL      RBC 4 06 Million/uL      Hemoglobin 13 0 g/dL      Hematocrit 41 1 %       fL      MCH 32 0 pg      MCHC 31 6 g/dL      RDW 13 4 %      MPV 10 8 fL      Platelets 505 Thousands/uL      nRBC 0 /100 WBCs      Neutrophils Relative 60 %      Immat GRANS % 0 %      Lymphocytes Relative 22 %      Monocytes Relative 9 %      Eosinophils Relative 8 %      Basophils Relative 1 %      Neutrophils Absolute 3 88 Thousands/µL      Immature Grans Absolute 0 02 Thousand/uL      Lymphocytes Absolute 1 47 Thousands/µL      Monocytes Absolute 0 57 Thousand/µL      Eosinophils Absolute 0 53 Thousand/µL      Basophils Absolute 0 08 Thousands/µL                  No orders to display              Procedures  Procedures         ED Course  ED Course as of Feb 23 1552   Sun Feb 23, 2020   1546 Patient feeling better resting comfortably no acute distress renal insufficiency just slightly above baseline will continue and finish IV fluid and follow up as an outpatient                                  MDM  Number of Diagnoses or Management Options  Diagnosis management comments: Generalized weakness and dehydration will check labs rehydrate  Abdomen is soft and benign no indication for imaging       Amount and/or Complexity of Data Reviewed  Clinical lab tests: ordered          Disposition  Final diagnoses:   Nausea vomiting and diarrhea   Dehydration   Renal insufficiency - Slight increased from baseline   Herniated nucleus pulposus, L5-S1     Time reflects when diagnosis was documented in both MDM as applicable and the Disposition within this note     Time User Action Codes Description Comment    2/23/2020  3:49 PM Arleth Rockers [R11 0] Nausea     2/23/2020  3:49 PM Clayton Fairy Add [R11 2,  R19 7] Nausea vomiting and diarrhea     2/23/2020  3:49 PM Edita Baljinder [R11 2,  R19 7] Nausea vomiting and diarrhea     2/23/2020  3:49 PM Clayton Fairy Remove [R11 0] Nausea     2/23/2020  3:49 PM Clayton Fairy Add [E86 0] Dehydration     2/23/2020  3:49 PM Clayton Fairy Add [N28 9] Renal insufficiency     2/23/2020  3:50 PM Clayton Fairy Modify [N28 9] Renal insufficiency Slight increased from baseline    2/23/2020  3:51 PM Clayton Fairy Add [M51 27] Herniated nucleus pulposus, L5-S1       ED Disposition     ED Disposition Condition Date/Time Comment    Discharge Stable Sun Feb 23, 2020  3:49 PM Jazzy discharge to home/self care  Follow-up Information     Follow up With Specialties Details Why Colleen Mabry 89, DO Internal Medicine In 1 week [facility]  [address] [facility]            Patient's Medications   Discharge Prescriptions    ONDANSETRON (ZOFRAN) 4 MG TABLET    Take 1 tablet (4 mg total) by mouth 3 (three) times a day as needed for nausea or vomiting       Start Date: 2/23/2020 End Date: --       Order Dose: 4 mg       Quantity: 15 tablet    Refills: 0     No discharge procedures on file      PDMP Review       Value Time User    PDMP Reviewed  Yes 2/20/2020  1:33 PM Allison Cutler DO          ED Provider  Electronically Signed by           Sonido Winter Karla Baron,   02/23/20 1554

## 2020-02-28 ENCOUNTER — VBI (OUTPATIENT)
Dept: FAMILY MEDICINE CLINIC | Facility: HOSPITAL | Age: 60
End: 2020-02-28

## 2020-03-02 DIAGNOSIS — M51.27 HERNIATED NUCLEUS PULPOSUS, L5-S1: ICD-10-CM

## 2020-03-02 DIAGNOSIS — R11.2 NAUSEA AND VOMITING, INTRACTABILITY OF VOMITING NOT SPECIFIED, UNSPECIFIED VOMITING TYPE: ICD-10-CM

## 2020-03-02 RX ORDER — ONDANSETRON 4 MG/1
4 TABLET, FILM COATED ORAL 3 TIMES DAILY PRN
Qty: 15 TABLET | Refills: 0 | Status: SHIPPED | OUTPATIENT
Start: 2020-03-02 | End: 2020-03-18 | Stop reason: SDUPTHER

## 2020-03-02 RX ORDER — ONDANSETRON 4 MG/1
4 TABLET, FILM COATED ORAL EVERY 8 HOURS PRN
Qty: 12 TABLET | Refills: 0 | Status: CANCELLED | OUTPATIENT
Start: 2020-03-02 | End: 2020-03-06

## 2020-03-05 ENCOUNTER — HOSPITAL ENCOUNTER (EMERGENCY)
Facility: HOSPITAL | Age: 60
Discharge: HOME | End: 2020-03-05
Attending: EMERGENCY MEDICINE | Admitting: EMERGENCY MEDICINE
Payer: MEDICARE

## 2020-03-05 VITALS
DIASTOLIC BLOOD PRESSURE: 63 MMHG | SYSTOLIC BLOOD PRESSURE: 132 MMHG | WEIGHT: 181 LBS | TEMPERATURE: 98.2 F | BODY MASS INDEX: 28.35 KG/M2 | OXYGEN SATURATION: 97 % | HEART RATE: 95 BPM | RESPIRATION RATE: 24 BRPM

## 2020-03-05 DIAGNOSIS — N39.0 URINARY TRACT INFECTION: ICD-10-CM

## 2020-03-05 DIAGNOSIS — N18.4 CHRONIC KIDNEY DISEASE (CKD), STAGE IV (SEVERE) (HCC): ICD-10-CM

## 2020-03-05 DIAGNOSIS — R53.1 GENERALIZED WEAKNESS: Primary | ICD-10-CM

## 2020-03-05 DIAGNOSIS — E86.0 DEHYDRATION: ICD-10-CM

## 2020-03-05 LAB
ALBUMIN SERPL BCP-MCNC: 3.7 G/DL (ref 3.5–5)
ALP SERPL-CCNC: 271 U/L (ref 46–116)
ALT SERPL W P-5'-P-CCNC: 25 U/L (ref 12–78)
ANION GAP SERPL CALCULATED.3IONS-SCNC: 13 MMOL/L (ref 4–13)
AST SERPL W P-5'-P-CCNC: 35 U/L (ref 5–45)
BACTERIA UR QL AUTO: ABNORMAL /HPF
BASOPHILS # BLD AUTO: 0.07 THOUSANDS/ΜL (ref 0–0.1)
BASOPHILS NFR BLD AUTO: 1 % (ref 0–1)
BILIRUB SERPL-MCNC: 0.4 MG/DL (ref 0.2–1)
BILIRUB UR QL STRIP: NEGATIVE
BUN SERPL-MCNC: 20 MG/DL (ref 5–25)
CALCIUM SERPL-MCNC: 9.1 MG/DL (ref 8.3–10.1)
CHLORIDE SERPL-SCNC: 109 MMOL/L (ref 100–108)
CLARITY UR: CLEAR
CLARITY, POC: CLEAR
CO2 SERPL-SCNC: 17 MMOL/L (ref 21–32)
COLOR UR: YELLOW
COLOR, POC: YELLOW
CREAT SERPL-MCNC: 2.85 MG/DL (ref 0.6–1.3)
EOSINOPHIL # BLD AUTO: 0.41 THOUSAND/ΜL (ref 0–0.61)
EOSINOPHIL NFR BLD AUTO: 7 % (ref 0–6)
ERYTHROCYTE [DISTWIDTH] IN BLOOD BY AUTOMATED COUNT: 13.9 % (ref 11.6–15.1)
EXT BILIRUBIN, UA: NEGATIVE
EXT BLOOD URINE: NORMAL
EXT GLUCOSE, UA: NEGATIVE
EXT KETONES: NEGATIVE
EXT NITRITE, UA: NEGATIVE
EXT PH, UA: 6
EXT PROTEIN, UA: NEGATIVE
EXT SPECIFIC GRAVITY, UA: 1.01
EXT UROBILINOGEN: NEGATIVE
GFR SERPL CREATININE-BSD FRML MDRD: 18 ML/MIN/1.73SQ M
GLUCOSE SERPL-MCNC: 129 MG/DL (ref 65–140)
GLUCOSE UR STRIP-MCNC: NEGATIVE MG/DL
HCT VFR BLD AUTO: 42.8 % (ref 34.8–46.1)
HGB BLD-MCNC: 13.6 G/DL (ref 11.5–15.4)
HGB UR QL STRIP.AUTO: ABNORMAL
IMM GRANULOCYTES # BLD AUTO: 0.02 THOUSAND/UL (ref 0–0.2)
IMM GRANULOCYTES NFR BLD AUTO: 0 % (ref 0–2)
KETONES UR STRIP-MCNC: NEGATIVE MG/DL
LEUKOCYTE ESTERASE UR QL STRIP: ABNORMAL
LYMPHOCYTES # BLD AUTO: 1.13 THOUSANDS/ΜL (ref 0.6–4.47)
LYMPHOCYTES NFR BLD AUTO: 18 % (ref 14–44)
MCH RBC QN AUTO: 31.5 PG (ref 26.8–34.3)
MCHC RBC AUTO-ENTMCNC: 31.8 G/DL (ref 31.4–37.4)
MCV RBC AUTO: 99 FL (ref 82–98)
MONOCYTES # BLD AUTO: 0.51 THOUSAND/ΜL (ref 0.17–1.22)
MONOCYTES NFR BLD AUTO: 8 % (ref 4–12)
NEUTROPHILS # BLD AUTO: 4.08 THOUSANDS/ΜL (ref 1.85–7.62)
NEUTS SEG NFR BLD AUTO: 66 % (ref 43–75)
NITRITE UR QL STRIP: NEGATIVE
NON-SQ EPI CELLS URNS QL MICRO: ABNORMAL /HPF
NRBC BLD AUTO-RTO: 0 /100 WBCS
PH UR STRIP.AUTO: 6.5 [PH]
PLATELET # BLD AUTO: 231 THOUSANDS/UL (ref 149–390)
PMV BLD AUTO: 11.3 FL (ref 8.9–12.7)
POTASSIUM SERPL-SCNC: 4.1 MMOL/L (ref 3.5–5.3)
PROT SERPL-MCNC: 7.4 G/DL (ref 6.4–8.2)
PROT UR STRIP-MCNC: ABNORMAL MG/DL
RBC # BLD AUTO: 4.32 MILLION/UL (ref 3.81–5.12)
RBC #/AREA URNS AUTO: ABNORMAL /HPF
SODIUM SERPL-SCNC: 139 MMOL/L (ref 136–145)
SP GR UR STRIP.AUTO: 1.01 (ref 1–1.03)
UROBILINOGEN UR QL STRIP.AUTO: 0.2 E.U./DL
WBC # BLD AUTO: 6.22 THOUSAND/UL (ref 4.31–10.16)
WBC # BLD EST: NORMAL 10*3/UL
WBC #/AREA URNS AUTO: ABNORMAL /HPF

## 2020-03-05 PROCEDURE — 85025 COMPLETE CBC W/AUTO DIFF WBC: CPT | Performed by: EMERGENCY MEDICINE

## 2020-03-05 PROCEDURE — 81001 URINALYSIS AUTO W/SCOPE: CPT | Performed by: EMERGENCY MEDICINE

## 2020-03-05 PROCEDURE — 99285 EMERGENCY DEPT VISIT HI MDM: CPT

## 2020-03-05 PROCEDURE — 80053 COMPREHEN METABOLIC PANEL: CPT | Performed by: EMERGENCY MEDICINE

## 2020-03-05 PROCEDURE — 96360 HYDRATION IV INFUSION INIT: CPT

## 2020-03-05 PROCEDURE — 36415 COLL VENOUS BLD VENIPUNCTURE: CPT | Performed by: EMERGENCY MEDICINE

## 2020-03-05 PROCEDURE — 99284 EMERGENCY DEPT VISIT MOD MDM: CPT | Performed by: EMERGENCY MEDICINE

## 2020-03-05 RX ORDER — ONDANSETRON 4 MG/1
4 TABLET, ORALLY DISINTEGRATING ORAL EVERY 8 HOURS PRN
Qty: 20 TABLET | Refills: 0 | Status: SHIPPED | OUTPATIENT
Start: 2020-03-05 | End: 2020-03-18 | Stop reason: SDUPTHER

## 2020-03-05 RX ORDER — CEPHALEXIN 250 MG/1
250 CAPSULE ORAL EVERY 12 HOURS SCHEDULED
Qty: 10 CAPSULE | Refills: 0 | Status: SHIPPED | OUTPATIENT
Start: 2020-03-05 | End: 2020-03-10

## 2020-03-05 RX ORDER — CEPHALEXIN 250 MG/1
250 CAPSULE ORAL EVERY 12 HOURS SCHEDULED
Qty: 10 CAPSULE | Refills: 0 | Status: SHIPPED | OUTPATIENT
Start: 2020-03-05 | End: 2020-03-05 | Stop reason: SDUPTHER

## 2020-03-05 RX ADMIN — SODIUM CHLORIDE 1000 ML: 0.9 INJECTION, SOLUTION INTRAVENOUS at 14:19

## 2020-03-06 NOTE — ED PROVIDER NOTES
History  Chief Complaint   Patient presents with    Weakness - Generalized     To ED with with c/o increasing weakness for sebveral weeks  Patient states that she has been having nausea, vomiting  Poor appetite  Has hx of kidney disease  28-year-old female presents for generalized weakness and vomiting  The patient has been seen multiple times for similar symptoms  She has been seen in the emergency department and an outpatient office  She reports that she has felt weak over the past few weeks worsening has no energy does not want to do anything decreased appetite  Vomiting from time to time  No blood  No diarrhea currently although did have diarrhea before  No chest pain shortness of breath or pain anywhere  No other associated symptoms or modifying factors  Symptoms intermittent since onset  Worsening  Patient has a past medical history of chronic kidney disease has a fistula in the left arm has not been used yet for but planning for dialysis at some point for  Follows with Dr Saadia Tavarez  On exam the patient has normal vital signs  She appears very well appearing  Abdomen is soft and nontender  She is well perfused in her extremities  She has clear lungs  Assessment plan:  Vomiting weakness  Check labs for worsening kidney disease, metabolic derangement as she has had low potassium before  Urine for urinary tract infection reassess for disposition after IV fluids          Prior to Admission Medications   Prescriptions Last Dose Informant Patient Reported? Taking?    AMILoride 5 mg tablet  Self Yes No   Sig: Take 5 mg by mouth 2 (two) times a day   Polyethylene Glycol 3350 (MIRALAX PO)  Self Yes No   Sig: Take by mouth as needed    QUEtiapine (SEROQUEL) 300 mg tablet  Self No No   Sig: Take 1 tablet (300 mg total) by mouth every morning 1 in the AM     ( also takes 400 mg at bedtime)   QUEtiapine (SEROquel) 400 MG tablet  Self Yes No   Si at bedtime   acetaminophen (TYLENOL) 325 mg tablet  Self Yes No   Sig: Take 650 mg by mouth every 6 (six) hours as needed for mild pain   albuterol (PROAIR HFA) 90 mcg/act inhaler  Self No No   Sig: Inhale 2 puffs every 6 (six) hours as needed for wheezing   Patient not taking: Reported on 2/23/2020   aspirin (ECOTRIN LOW STRENGTH) 81 mg EC tablet  Self No No   Sig: Take 1 tablet (81 mg total) by mouth daily   atorvastatin (LIPITOR) 40 mg tablet  Self No No   Sig: Take 1 tablet (40 mg total) by mouth daily   budesonide-formoterol (SYMBICORT) 160-4 5 mcg/act inhaler  Self No No   Sig: Inhale 2 puffs 2 (two) times a day Rinse mouth after use     carvedilol (COREG) 3 125 mg tablet   No No   Sig: TAKE 1 TABLET BY MOUTH TWICE DAILY WITH MEALS   clonazePAM (KlonoPIN) 0 5 mg tablet   No No   Sig: Take 1 tablet (0 5 mg total) by mouth 3 (three) times a day   fluocinonide (LIDEX) 0 05 % ointment  Self No No   Sig: Apply topically 2 (two) times a day   fluvoxaMINE (LUVOX) 100 mg tablet  Self Yes No   Sig: Take 100 mg by mouth 3 (three) times a day     lamoTRIgine (LaMICtal) 100 mg tablet  Self Yes No   Sig: Take 400 mg by mouth daily at bedtime    linaCLOtide (LINZESS) 290 MCG CAPS  Self No No   Sig: Take 1 capsule by mouth daily for 90 days   omeprazole (PriLOSEC) 40 MG capsule   No No   Sig: Take 1 capsule (40 mg total) by mouth daily at bedtime   ondansetron (ZOFRAN) 4 mg tablet   No No   Sig: Take 1 tablet (4 mg total) by mouth every 8 (eight) hours as needed for nausea or vomiting for up to 4 days   ondansetron (ZOFRAN) 4 mg tablet   No No   Sig: Take 1 tablet (4 mg total) by mouth 3 (three) times a day as needed for nausea or vomiting   traMADol (ULTRAM) 50 mg tablet  Self No No   Sig: Take 1 tablet (50 mg total) by mouth 2 (two) times a day      Facility-Administered Medications: None       Past Medical History:   Diagnosis Date    Ankle sprain     left    Anxiety disorder     Bipolar 2 disorder (HCC)     Chronic back pain     CKD (chronic kidney disease) stage 3, GFR 30-59 ml/min (HCC)     stage 4 (as per pt)    CVA [facility]cerebral vascular accident) ([facility] [address] )     noted on MRI in the past    Depression     GERD (gastroesophageal reflux disease)     Hypercholesteremia     Hyperlipidemia     Hypernatremia     Hypertension     Hypokalemia     Intervertebral disc disorder with radiculopathy of lumbosacral region     resolved: 2015    Kidney disease     Panic attacks     Pericardial effusion     PONV (postoperative nausea and vomiting)     Radiculitis     resolved: 2015    Secondary renal hyperparathyroidism ([facility] [address] )     Vitamin D deficiency        Past Surgical History:   Procedure Laterality Date    BUNIONECTOMY      Left foot     COLON SURGERY      COLONOSCOPY  2018    DILATION AND CURETTAGE OF UTERUS      INDUCED       surgically induced    NE ANASTOMOSIS,AV,ANY SITE Left 2019    Procedure: CREATION FISTULA ARTERIOVENOUS (AV) left wrists possible left upper;  Surgeon: Jone Galan MD;  [facility];  Service: Vascular    NE CORRJ HALLUX VALGUS W/SESMDC W/DIST METAR OSTEOT Left 2019    Procedure: Radha Watkins;  Surgeon: Lloyd Garcia DPM;  [facility];  Service: Podiatry    NE ERCP DX COLLECTION SPECIMEN BRUSHING/WASHING N/A 2018    Procedure: ENDOSCOPIC RETROGRADE CHOLANGIOPANCREATOGRAPHY (ERCP);   Surgeon: Kelly Limon MD;  [facility];  Service: Gastroenterology    NE LAP, SURG PROCTOPEXY N/A 2018    Procedure: ROBOTIC SIGMOID RESECTION / RECTOPEXY;  Surgeon: Jacklyn Ortega MD;  [facility];  Service: Colorectal    NE SIGMOIDOSCOPY FLX DX W/COLLJ SPEC BR/WA IF PFRMD N/A 2018    Procedure: Tari [place];  Surgeon: Jacklyn Ortega MD;  [facility];  Service: Colorectal    TUBAL LIGATION Bilateral [address] [facility] THYROID BIOPSY  2019       Family History   Problem Relation Age of Onset    Bipolar disorder Mother     Mental illness Mother         depression    Stroke Mother     Dementia Mother     Heart disease Father     Hypertension Father     Diabetes Father     Other Family         Back disorder    Diabetes Family     Heart disease Family     Hypertension Family     Breast cancer Family     Stroke Family     Thyroid disease Family     Breast cancer Paternal Grandmother     Breast cancer Paternal [de-identified]     Breast cancer Maternal Aunt     Mental illness Sister     Mental illness Sister     Heart disease Sister     No Known Problems Sister     No Known Problems Sister     Other Son         pituitary tumor    Hypertension Son     Obesity Son     No Known Problems Son     Substance Abuse Neg Hx         neg fam hx     I have reviewed and agree with the history as documented  E-Cigarette/Vaping    E-Cigarette Use Never User      E-Cigarette/Vaping Substances    Nicotine No     THC No     CBD No     Flavoring No     Other No     Unknown No      Social History     Tobacco Use    Smoking status: Never Smoker    Smokeless tobacco: Never Used   Substance Use Topics    Alcohol use: Never     Frequency: Never     Binge frequency: Never    Drug use: Not Currently     Types: Marijuana       Review of Systems   Constitutional: Negative for diaphoresis, fatigue and fever  HENT: Negative for facial swelling and nosebleeds  Eyes: Negative for pain and visual disturbance  Respiratory: Negative for apnea, cough, shortness of breath and wheezing  Cardiovascular: Negative for chest pain and leg swelling  Gastrointestinal: Negative for abdominal distention, abdominal pain, anal bleeding, blood in stool, nausea, rectal pain and vomiting  Genitourinary: Negative for difficulty urinating, dysuria and flank pain  Musculoskeletal: Negative for back pain, neck pain and neck stiffness  Neurological: Negative for dizziness, syncope, weakness, light-headedness and headaches     All other systems reviewed and are negative  Physical Exam  Physical Exam   Constitutional: She is oriented to person, place, and time  She appears well-developed and well-nourished  No distress  HENT:   Head: Normocephalic and atraumatic  Nose: Nose normal    Eyes: Pupils are equal, round, and reactive to light  Conjunctivae and EOM are normal  No scleral icterus  Neck: Normal range of motion  Neck supple  No JVD present  No tracheal deviation present  No thyromegaly present  Cardiovascular: Normal rate, regular rhythm, normal heart sounds and intact distal pulses  Exam reveals no gallop and no friction rub  Pulmonary/Chest: Effort normal and breath sounds normal  No respiratory distress  She has no wheezes  She has no rales  She exhibits no tenderness  Abdominal: Soft  Bowel sounds are normal  She exhibits no distension and no mass  There is no tenderness  There is no rebound and no guarding  No hernia  Musculoskeletal: Normal range of motion  She exhibits no edema, tenderness or deformity  Neurological: She is alert and oriented to person, place, and time  She has normal reflexes  No cranial nerve deficit  Coordination normal    Skin: Skin is warm and dry  She is not diaphoretic  No erythema  Psychiatric: She has a normal mood and affect  Her behavior is normal    Nursing note and vitals reviewed        Vital Signs  ED Triage Vitals [03/05/20 1213]   Temperature Pulse Respirations Blood Pressure SpO2   98 2 °F (36 8 °C) 78 20 135/77 100 %      Temp Source Heart Rate Source Patient Position - Orthostatic VS BP Location FiO2 (%)   Tympanic Monitor Sitting Right arm --      Pain Score       No Pain           Vitals:    03/05/20 1345 03/05/20 1400 03/05/20 1500 03/05/20 1530   BP: 125/64 128/70 140/67 132/63   Pulse: 95 94 95 95   Patient Position - Orthostatic VS: Sitting  Sitting Lying         Visual Acuity      ED Medications  Medications   sodium chloride 0 9 % bolus 1,000 mL (0 mL Intravenous Stopped 3/5/20 1528) Diagnostic Studies  Results Reviewed     Procedure Component Value Units Date/Time    Comprehensive metabolic panel [[id number]]  (Abnormal) Collected:  03/05/20 1343    Lab Status:  Final result Specimen:  Blood from Arm, Right Updated:  03/05/20 1407     Sodium 139 mmol/L      Potassium 4 1 mmol/L      Chloride 109 mmol/L      CO2 17 mmol/L      ANION GAP 13 mmol/L      BUN 20 mg/dL      Creatinine 2 85 mg/dL      Glucose 129 mg/dL      Calcium 9 1 mg/dL      AST 35 U/L      ALT 25 U/L      Alkaline Phosphatase 271 U/L      Total Protein 7 4 g/dL      Albumin 3 7 g/dL      Total Bilirubin 0 40 mg/dL      eGFR 18 ml/min/1 73sq m     Narrative:       National Kidney Disease Foundation guidelines for Chronic Kidney Disease (CKD):     Stage 1 with normal or high GFR (GFR > 90 mL/min/1 73 square meters)    Stage 2 Mild CKD (GFR = 60-89 mL/min/1 73 square meters)    Stage 3A Moderate CKD (GFR = 45-59 mL/min/1 73 square meters)    Stage 3B Moderate CKD (GFR = 30-44 mL/min/1 73 square meters)    Stage 4 Severe CKD (GFR = 15-29 mL/min/1 73 square meters)    Stage 5 End Stage CKD (GFR <15 mL/min/1 73 square meters)  Note: GFR calculation is accurate only with a steady state creatinine    Urine Microscopic [[id number]]  (Abnormal) Collected:  03/05/20 1333    Lab Status:  Final result Specimen:  Urine, Clean Catch Updated:  03/05/20 1350     RBC, UA 0-1 /hpf      WBC, UA 4-10 /hpf      Epithelial Cells Occasional /hpf      Bacteria, UA Occasional /hpf     UA w Reflex to Microscopic w Reflex to Culture [[id number]]  (Abnormal) Collected:  03/05/20 1333    Lab Status:  Final result Specimen:  Urine, Clean Catch Updated:  03/05/20 1341     Color, UA Yellow     Clarity, UA Clear     Specific Gravity, UA 1 010     pH, UA 6 5     Leukocytes, UA Large     Nitrite, UA Negative     Protein, UA Trace mg/dl      Glucose, UA Negative mg/dl      Ketones, UA Negative mg/dl      Urobilinogen, UA 0 2 E U /dl      Bilirubin, UA Negative     Blood, UA Trace-Intact    POCT urinalysis dipstick [[id number]]  (Normal) Resulted:  03/05/20 1314    Lab Status:  Final result Specimen:  Urine Updated:  03/05/20 1315     Color, UA Yellow     Clarity, UA clear     Glucose, UA (Ref: Negative) negative     Bilirubin, UA (Ref: Negative) negative     Ketones, UA (Ref: Negative) negative     Spec Grav, UA (Ref:1 003-1 030) 1 015     Blood, UA (Ref: Negative) small     pH, UA (Ref: 4 5-8 0) 6     Protein, UA (Ref: Negative) negative     Urobilinogen, UA (Ref: 0 2- 1 0) negative      Leukocytes, UA (Ref: Negative) large     Nitrite, UA (Ref: Negative) negative    CBC and differential [[id number]]  (Abnormal) Collected:  03/05/20 1250    Lab Status:  Final result Specimen:  Blood from Arm, Right Updated:  03/05/20 1302     WBC 6 22 Thousand/uL      RBC 4 32 Million/uL      Hemoglobin 13 6 g/dL      Hematocrit 42 8 %      MCV 99 fL      MCH 31 5 pg      MCHC 31 8 g/dL      RDW 13 9 %      MPV 11 3 fL      Platelets 929 Thousands/uL      nRBC 0 /100 WBCs      Neutrophils Relative 66 %      Immat GRANS % 0 %      Lymphocytes Relative 18 %      Monocytes Relative 8 %      Eosinophils Relative 7 %      Basophils Relative 1 %      Neutrophils Absolute 4 08 Thousands/µL      Immature Grans Absolute 0 02 Thousand/uL      Lymphocytes Absolute 1 13 Thousands/µL      Monocytes Absolute 0 51 Thousand/µL      Eosinophils Absolute 0 41 Thousand/µL      Basophils Absolute 0 07 Thousands/µL                  No orders to display              Procedures  Procedures         ED Course                               MDM  Number of Diagnoses or Management Options  Chronic kidney disease (CKD), stage IV (severe) ([facility] [address] ): new and requires workup  Dehydration: new and requires workup  Generalized weakness: new and requires workup  Urinary tract infection: new and requires workup  Diagnosis management comments: Patient had leukocytes in her urine    Could be part of the reason why she is ketan   Reports decreased urine but this could be to her chronic kidney disease  No true dysuria  She also has worsening kidney disease her GFR is 18  Her creatinine is slightly elevated from last time  Over the past few months her cr ahs been steadily increasing  Baseline 2 5 today 2 8  She did receive 1 L of IV fluids  Theoretically this should improve her creatinine but she needs to follow-up with her nephrologist given dialysis is likely iminent  She has no significant metabolic derangement today       Amount and/or Complexity of Data Reviewed  Clinical lab tests: reviewed and ordered  Tests in the medicine section of CPT®: ordered and reviewed  Decide to obtain previous medical records or to obtain history from someone other than the patient: yes  Review and summarize past medical records: yes    Patient Progress  Patient progress: stable        Disposition  Final diagnoses:   Generalized weakness   Dehydration   Urinary tract infection   Chronic kidney disease (CKD), stage IV (severe) ([facility] [address] )     Time reflects when diagnosis was documented in both MDM as applicable and the Disposition within this note     Time User Action Codes Description Comment    3/5/2020  3:28 PM Max Slaughter Add [R53 1] Generalized weakness     3/5/2020  3:28 PM Max Slaughter Add [E86 0] Dehydration     3/5/2020  3:28 PM Lorelle Furry F Add [N39 0] Urinary tract infection     3/5/2020 11:08 PM Lorelle Furry F Add [N18 4] Chronic kidney disease (CKD), stage IV (severe) [facility])       ED Disposition     ED Disposition Condition Date/Time Comment    Discharge Stable Thu Mar 5, 2020  3:28 PM Jazzy discharge to home/self care              Follow-up Information     Follow up With Specialties Details Why Colleen Mabry 104, DO Internal Medicine Schedule an appointment as soon as possible for a visit   [place]  [address] Nephrology Schedule an appointment as soon as possible for a visit   [address]  [address] [place] [address]  [phone]            Discharge Medication List as of 3/5/2020  3:32 PM      START taking these medications    Details   cephalexin (KEFLEX) 250 mg capsule Take 1 capsule (250 mg total) by mouth every 12 (twelve) hours for 5 days, Starting Thu 3/5/2020, Until Tue 3/10/2020, Print         CONTINUE these medications which have NOT CHANGED    Details   acetaminophen (TYLENOL) 325 mg tablet Take 650 mg by mouth every 6 (six) hours as needed for mild pain, Historical Med      albuterol (PROAIR HFA) 90 mcg/act inhaler Inhale 2 puffs every 6 (six) hours as needed for wheezing, Starting Tue 8/27/2019, Normal      AMILoride 5 mg tablet Take 5 mg by mouth 2 (two) times a day, Starting Fri 12/20/2019, Historical Med      aspirin (ECOTRIN LOW STRENGTH) 81 mg EC tablet Take 1 tablet (81 mg total) by mouth daily, Starting Wed 8/14/2019, No Print      atorvastatin (LIPITOR) 40 mg tablet Take 1 tablet (40 mg total) by mouth daily, Starting Wed 10/2/2019, Normal      budesonide-formoterol (SYMBICORT) 160-4 5 mcg/act inhaler Inhale 2 puffs 2 (two) times a day Rinse mouth after use , Starting Tue 8/27/2019, Print      carvedilol (COREG) 3 125 mg tablet TAKE 1 TABLET BY MOUTH TWICE DAILY WITH MEALS, Normal      clonazePAM (KlonoPIN) 0 5 mg tablet Take 1 tablet (0 5 mg total) by mouth 3 (three) times a day, Starting Thu 2/20/2020, Normal      fluocinonide (LIDEX) 0 05 % ointment Apply topically 2 (two) times a day, Starting Wed 1/8/2020, Normal      fluvoxaMINE (LUVOX) 100 mg tablet Take 100 mg by mouth 3 (three) times a day  , Starting Wed 10/10/2018, Historical Med      lamoTRIgine (LaMICtal) 100 mg tablet Take 400 mg by mouth daily at bedtime , Historical Med      linaCLOtide (LINZESS) 290 MCG CAPS Take 1 capsule by mouth daily for 90 days, Starting Tue 9/3/2019, Until Thu 2/6/2020, Print      omeprazole (PriLOSEC) 40 MG capsule Take 1 capsule (40 mg total) by mouth daily at bedtime, Starting Thu 2/20/2020, Normal      ondansetron (ZOFRAN) 4 mg tablet Take 1 tablet (4 mg total) by mouth 3 (three) times a day as needed for nausea or vomiting, Starting Mon 3/2/2020, Normal      Polyethylene Glycol 3350 (MIRALAX PO) Take by mouth as needed , Historical Med      !! QUEtiapine (SEROQUEL) 300 mg tablet Take 1 tablet (300 mg total) by mouth every morning 1 in the AM     ( also takes 400 mg at bedtime), Starting Wed 1/2/2019, Normal      !! QUEtiapine (SEROquel) 400 MG tablet 1 at bedtime, Historical Med      traMADol (ULTRAM) 50 mg tablet Take 1 tablet (50 mg total) by mouth 2 (two) times a day, Starting Wed 10/30/2019, Normal       !! - Potential duplicate medications found  Please discuss with provider  No discharge procedures on file      PDMP Review       Value Time User    PDMP Reviewed  Yes 2/20/2020  1:33 PM April Kehr, DO          ED Provider  Electronically Signed by           Shobha Barron DO  03/05/20 6273

## 2020-03-10 ENCOUNTER — VBI (OUTPATIENT)
Dept: FAMILY MEDICINE CLINIC | Facility: HOSPITAL | Age: 60
End: 2020-03-10

## 2020-03-10 NOTE — TELEPHONE ENCOUNTER
Steve Basilio    ED Visit Information     Ed visit date: 3/5/2020  Diagnosis Description: Generalized weakness; Dehydration; Urinary tract infection; Chronic kidney disease (CKD), stage IV (severe) ([facility] [address] )  In Network? [id number]  Discharge status: Home  Discharged with meds ? Yes  Number of ED visits to date: 3 ( 1 ED to hospital admission)   ED Severity:n/a     Outreach Information    Outreach successful: Yes 1  Date letter mailed:n/a  Date Finalized:3/10/2020    Care Coordination    Follow up appointment with specialilty: yes 3/30/2020   Transportation issues ? No    Value [employer] type:  7 Day Outreach  Emergent necessity warranted by diagnosis:  No  Called PCP first?:  No  Feels able to call PCP for urgent problems ?:  Yes  Understands what emergencies can be handled by PCP ?:  Yes  Ever any problems getting appointment with PCP for minor emergency/urgency problems?:  No  Practice Contacted Patient ?:  No  Pt had ED follow up with pcp/staff ?:  No    Seen for follow-up out of network ?:  No  Reason Patient went to ED instead of Urgent Care or PCP?:  Perceived Severity of Illness  03/10/2020 03:02 PM Phone (Josy Glover (Self) [phone] (M)   Call Complete  Personal communication with patient regarding recent ED visit on 3/5/2020 for Generalized weakness; Dehydration; Urinary tract infection; Chronic kidney disease (CKD), stage IV (severe) ([facility] [address] )  Patient was discharged with cephalexin and advised to follow up with PCP and nephrologist   Patient stated that she is doing okay and stated that she will follow up with Dr Lizzette Arceo  Patient stated that Dr Lizzette Arceo is on-call this week and on vacation next week  She requested that I message PCP office requesting a call back to help her schedule an appt and her nephrologist to let them know that she was in the ED  Patient does not meet OPCM criteria  Patient is aware of PCP after hours on-call service   Patient is aware of urgent care facility and what conditions may be treated there

## 2020-03-11 ENCOUNTER — TELEPHONE (OUTPATIENT)
Dept: FAMILY MEDICINE CLINIC | Facility: HOSPITAL | Age: 60
End: 2020-03-11

## 2020-03-11 ENCOUNTER — TELEPHONE (OUTPATIENT)
Dept: NEPHROLOGY | Facility: HOSPITAL | Age: 60
End: 2020-03-11

## 2020-03-11 DIAGNOSIS — N18.4 CKD (CHRONIC KIDNEY DISEASE) STAGE 4, GFR 15-29 ML/MIN (HCC): ICD-10-CM

## 2020-03-11 DIAGNOSIS — I10 ESSENTIAL HYPERTENSION: Primary | ICD-10-CM

## 2020-03-11 NOTE — TELEPHONE ENCOUNTER
----- Message from Samantha Mckeon sent at 3/10/2020  3:12 PM EDT -----  Regarding: ED follow up patient request  Contact: [phone]  Hello,    I've recently spoken with patient regarding recent ED visit on 3/5/2020 for Generalized weakness; Dehydration; Urinary tract infection; Chronic kidney disease (CKD), stage IV (severe) ([facility] [address] )  Patient was discharged with cephalexin and advised to follow up with PCP and nephrologist   Patient stated that sh is doing okay and stated that she will follow up with Dr Tomasa Johnson  Patient stated that Dr Tomasa Johnson is on-call this week and on vacation next week  She requested that I message PCP office requesting a call back to help her schedule   Please call patient to assist     Thank you,  Gregory Farr

## 2020-03-11 NOTE — TELEPHONE ENCOUNTER
----- Message from Kimberly Naylor DO sent at 3/11/2020  4:22 PM EDT -----  Regarding: RE: ED follow up patient request  Contact: [phone]  Reviewed thank you    Can you have her repeat a bmp before she sees me at the end of the month  Thanks    ----- Message -----  From: Roshni Santana  Sent: 3/11/2020   8:20 AM EDT  To: Kimberly Naylor DO  Subject: FW: ED follow up patient request                     ----- Message -----  From: Shantal Watts  Sent: 3/10/2020   3:13 PM EDT  To: Ne[facility]  Subject: ED follow up patient request                     Sarika Avalos recently spoken with patient regarding recent ED visit on 3/5/2020 for Generalized weakness; Dehydration; Urinary tract infection; Chronic kidney disease (CKD), stage IV (severe) ([facility] [address] )  Patient was discharged with cephalexin and advised to follow up with PCP and nephrologist   Patient stated that she is doing okay and requested that  I message her nephrologist to let them know that she was in the ED so that labs may be reviewed       Thank you,  Mitch Canavan

## 2020-03-12 NOTE — TELEPHONE ENCOUNTER
I spoke to the patient, she is aware she should repeat a BMP before her appointment on 3/30 with Dr Rafal Beck  She will have that done at [place] [id number]

## 2020-03-13 ENCOUNTER — APPOINTMENT (OUTPATIENT)
Dept: LAB | Facility: HOSPITAL | Age: 60
End: 2020-03-13
Attending: INTERNAL MEDICINE
Payer: MEDICARE

## 2020-03-13 DIAGNOSIS — I10 ESSENTIAL HYPERTENSION: ICD-10-CM

## 2020-03-13 DIAGNOSIS — R11.2 NAUSEA AND VOMITING, INTRACTABILITY OF VOMITING NOT SPECIFIED, UNSPECIFIED VOMITING TYPE: ICD-10-CM

## 2020-03-13 DIAGNOSIS — N18.6 END STAGE RENAL DISEASE (HCC): ICD-10-CM

## 2020-03-13 DIAGNOSIS — N18.4 CKD (CHRONIC KIDNEY DISEASE) STAGE 4, GFR 15-29 ML/MIN (HCC): ICD-10-CM

## 2020-03-13 LAB
ANION GAP SERPL CALCULATED.3IONS-SCNC: 6 MMOL/L (ref 4–13)
BUN SERPL-MCNC: 28 MG/DL (ref 5–25)
CALCIUM SERPL-MCNC: 9.6 MG/DL (ref 8.3–10.1)
CHLORIDE SERPL-SCNC: 123 MMOL/L (ref 100–108)
CO2 SERPL-SCNC: 19 MMOL/L (ref 21–32)
CREAT SERPL-MCNC: 2.34 MG/DL (ref 0.6–1.3)
GFR SERPL CREATININE-BSD FRML MDRD: 22 ML/MIN/1.73SQ M
GLUCOSE P FAST SERPL-MCNC: 123 MG/DL (ref 65–99)
POTASSIUM SERPL-SCNC: 3.9 MMOL/L (ref 3.5–5.3)
SODIUM SERPL-SCNC: 148 MMOL/L (ref 136–145)

## 2020-03-13 PROCEDURE — 36415 COLL VENOUS BLD VENIPUNCTURE: CPT

## 2020-03-13 PROCEDURE — 80048 BASIC METABOLIC PNL TOTAL CA: CPT

## 2020-03-18 ENCOUNTER — OFFICE VISIT (OUTPATIENT)
Dept: FAMILY MEDICINE CLINIC | Facility: HOSPITAL | Age: 60
End: 2020-03-18
Payer: MEDICARE

## 2020-03-18 VITALS
WEIGHT: 188.8 LBS | BODY MASS INDEX: 29.57 KG/M2 | TEMPERATURE: 99.5 F | SYSTOLIC BLOOD PRESSURE: 142 MMHG | HEART RATE: 88 BPM | DIASTOLIC BLOOD PRESSURE: 76 MMHG

## 2020-03-18 DIAGNOSIS — F40.01 PANIC DISORDER WITH AGORAPHOBIA: Primary | ICD-10-CM

## 2020-03-18 DIAGNOSIS — K21.9 GASTROESOPHAGEAL REFLUX DISEASE, ESOPHAGITIS PRESENCE NOT SPECIFIED: ICD-10-CM

## 2020-03-18 DIAGNOSIS — N39.0 URINARY TRACT INFECTION: ICD-10-CM

## 2020-03-18 DIAGNOSIS — N18.4 CKD (CHRONIC KIDNEY DISEASE) STAGE 4, GFR 15-29 ML/MIN (HCC): ICD-10-CM

## 2020-03-18 DIAGNOSIS — R53.1 GENERALIZED WEAKNESS: ICD-10-CM

## 2020-03-18 DIAGNOSIS — R73.01 ELEVATED FASTING GLUCOSE: ICD-10-CM

## 2020-03-18 DIAGNOSIS — L20.82 FLEXURAL ECZEMA: ICD-10-CM

## 2020-03-18 DIAGNOSIS — M46.1 BILATERAL SACROILIITIS (HCC): ICD-10-CM

## 2020-03-18 PROCEDURE — 99214 OFFICE O/P EST MOD 30 MIN: CPT | Performed by: PHYSICIAN ASSISTANT

## 2020-03-18 PROCEDURE — 1036F TOBACCO NON-USER: CPT | Performed by: PHYSICIAN ASSISTANT

## 2020-03-18 PROCEDURE — 1111F DSCHRG MED/CURRENT MED MERGE: CPT | Performed by: PHYSICIAN ASSISTANT

## 2020-03-18 RX ORDER — FLUOCINONIDE 0.5 MG/G
OINTMENT TOPICAL 2 TIMES DAILY
Qty: 60 G | Refills: 1 | Status: SHIPPED | OUTPATIENT
Start: 2020-03-18 | End: 2021-04-06 | Stop reason: SDUPTHER

## 2020-03-18 RX ORDER — TRAMADOL HYDROCHLORIDE 50 MG/1
50 TABLET ORAL 2 TIMES DAILY
Qty: 20 TABLET | Refills: 0 | Status: SHIPPED | OUTPATIENT
Start: 2020-03-18 | End: 2020-03-26 | Stop reason: SDUPTHER

## 2020-03-18 RX ORDER — OMEPRAZOLE 20 MG/1
20 CAPSULE, DELAYED RELEASE ORAL
Qty: 30 CAPSULE | Refills: 3 | Status: SHIPPED | OUTPATIENT
Start: 2020-03-18 | End: 2020-06-17 | Stop reason: CLARIF

## 2020-03-18 RX ORDER — ONDANSETRON 4 MG/1
4 TABLET, ORALLY DISINTEGRATING ORAL EVERY 8 HOURS PRN
Qty: 20 TABLET | Refills: 0 | Status: SHIPPED | OUTPATIENT
Start: 2020-03-18 | End: 2020-03-26 | Stop reason: SDUPTHER

## 2020-03-18 RX ORDER — CITALOPRAM 10 MG/1
10 TABLET ORAL DAILY
Qty: 30 TABLET | Refills: 5 | Status: SHIPPED | OUTPATIENT
Start: 2020-03-18 | End: 2020-04-27 | Stop reason: ALTCHOICE

## 2020-03-18 NOTE — PROGRESS NOTES
Assessment/Plan:     Diagnoses and all orders for this visit:    CKD-  Stage 4 - GFR 15-29    Elevated fasting glucose  -     Hemoglobin A1C; Future    Generalized weakness  -     ondansetron (ZOFRAN-ODT) 4 mg disintegrating tablet; Take 1 tablet (4 mg total) by mouth every 8 (eight) hours as needed for nausea or vomiting for up to 15 doses      Flexural eczema  -     fluocinonide (LIDEX) 0 05 % ointment; Apply topically 2 (two) times a day    Gastroesophageal reflux disease, esophagitis presence not specified  -     omeprazole (PriLOSEC) 20 mg delayed release capsule; Take 1 capsule (20 mg total) by mouth daily at bedtime    Panic disorder with agoraphobia  -     citalopram (CeleXA) 10 mg tablet; Take 1 tablet (10 mg total) by mouth daily        Subjective:      Patient ID: Anna Priest is a 64 y o  female  64year old white female c/o nausea, and occasional vomiting  Taking Zofran as needed which helps  Has ongoing rash all over body, very pruritic, could not buy cream, was very expensive  Patient has kidney dz  Stage 4, sees nephrologist regularly  Review of Systems   Constitutional: Positive for appetite change and fatigue  Negative for chills, diaphoresis and fever  HENT: Negative for congestion, ear pain, rhinorrhea and sore throat  Respiratory: Negative for cough, chest tightness and shortness of breath  Gastrointestinal: Positive for abdominal pain, constipation, nausea and vomiting  Negative for diarrhea  Neurological: Positive for dizziness, light-headedness and headaches  Psychiatric/Behavioral: Positive for decreased concentration and dysphoric mood  Negative for self-injury, sleep disturbance and suicidal ideas  The patient is nervous/anxious            Objective:      /76   Pulse 88   Temp 99 5 °F (37 5 °C) (Tympanic)   Wt 85 6 kg (188 lb 12 8 oz)   LMP  (LMP Unknown)   BMI 29 57 kg/m²          Physical Exam   Constitutional: She is oriented to person, place, and time  She appears well-developed and well-nourished  No distress  Cardiovascular: Normal rate, regular rhythm and normal heart sounds  Pulmonary/Chest: Effort normal and breath sounds normal  No stridor  No respiratory distress  She has no wheezes  She has no rales  She exhibits no tenderness  Musculoskeletal: Normal range of motion  She exhibits no edema, tenderness or deformity  Neurological: She is alert and oriented to person, place, and time  No cranial nerve deficit  Coordination normal    Skin: Rash noted  She is not diaphoretic  There is erythema  Dispersed, erythematous rash noted, papular, all over body  Psychiatric: Her behavior is normal  Judgment and thought content normal    Low mood  Nursing note and vitals reviewed

## 2020-03-18 NOTE — PATIENT INSTRUCTIONS
Recommend vitamin D 4000 IU daily, and over the counter probiotics, ex  Align, or [employer]      Follow up with nephrologist, and psychiatrist

## 2020-03-20 ENCOUNTER — TELEPHONE (OUTPATIENT)
Dept: NEPHROLOGY | Facility: CLINIC | Age: 60
End: 2020-03-20

## 2020-03-20 DIAGNOSIS — N18.4 CKD (CHRONIC KIDNEY DISEASE) STAGE 4, GFR 15-29 ML/MIN (HCC): Primary | ICD-10-CM

## 2020-03-20 NOTE — TELEPHONE ENCOUNTER
Yes her creatinine came down to 2 34 but her sodium was slightly increased at 148    Can we have her repeat before her appointment her telemedicine appointment at the end of March

## 2020-03-20 NOTE — TELEPHONE ENCOUNTER
I called and spoke with Tete Beckman  She is aware of her blood work results and understands to repeat a BMP prior to her 3/30 appointment  No other concerns at the moment

## 2020-03-20 NOTE — TELEPHONE ENCOUNTER
I called and spoke with Libra Saravia  She was agreeable to a telemedicine visit on 3/30  She is aware that this is a billable visit  She wanted to know if you had a chance to review her BMP results from 3/13?

## 2020-03-24 ENCOUNTER — APPOINTMENT (OUTPATIENT)
Dept: LAB | Facility: HOSPITAL | Age: 60
End: 2020-03-24
Payer: MEDICARE

## 2020-03-24 ENCOUNTER — TELEPHONE (OUTPATIENT)
Dept: NEPHROLOGY | Facility: CLINIC | Age: 60
End: 2020-03-24

## 2020-03-24 DIAGNOSIS — N18.4 CKD (CHRONIC KIDNEY DISEASE) STAGE 4, GFR 15-29 ML/MIN (HCC): ICD-10-CM

## 2020-03-24 DIAGNOSIS — R73.01 ELEVATED FASTING GLUCOSE: ICD-10-CM

## 2020-03-24 LAB
ANION GAP SERPL CALCULATED.3IONS-SCNC: 5 MMOL/L (ref 4–13)
BUN SERPL-MCNC: 34 MG/DL (ref 5–25)
CALCIUM SERPL-MCNC: 9.6 MG/DL (ref 8.3–10.1)
CHLORIDE SERPL-SCNC: 113 MMOL/L (ref 100–108)
CO2 SERPL-SCNC: 26 MMOL/L (ref 21–32)
CREAT SERPL-MCNC: 2.13 MG/DL (ref 0.6–1.3)
EST. AVERAGE GLUCOSE BLD GHB EST-MCNC: 108 MG/DL
GFR SERPL CREATININE-BSD FRML MDRD: 25 ML/MIN/1.73SQ M
GLUCOSE P FAST SERPL-MCNC: 91 MG/DL (ref 65–99)
HBA1C MFR BLD: 5.4 %
POTASSIUM SERPL-SCNC: 5 MMOL/L (ref 3.5–5.3)
SODIUM SERPL-SCNC: 144 MMOL/L (ref 136–145)

## 2020-03-24 PROCEDURE — 80048 BASIC METABOLIC PNL TOTAL CA: CPT

## 2020-03-24 PROCEDURE — 36415 COLL VENOUS BLD VENIPUNCTURE: CPT

## 2020-03-24 PROCEDURE — 83036 HEMOGLOBIN GLYCOSYLATED A1C: CPT

## 2020-03-24 NOTE — TELEPHONE ENCOUNTER
Left message for the patient to call back in regards to her recent lab test results from Dr Rao Vasquez MA

## 2020-03-24 NOTE — TELEPHONE ENCOUNTER
----- Message from Lonzell Closs, DO sent at 3/24/2020  2:11 PM EDT -----  Stable creatinine will discuss with her at fu appointment next week virtual

## 2020-03-26 DIAGNOSIS — R53.1 GENERALIZED WEAKNESS: ICD-10-CM

## 2020-03-26 DIAGNOSIS — N39.0 URINARY TRACT INFECTION: ICD-10-CM

## 2020-03-26 DIAGNOSIS — M46.1 BILATERAL SACROILIITIS (HCC): ICD-10-CM

## 2020-03-27 ENCOUNTER — TELEPHONE (OUTPATIENT)
Dept: FAMILY MEDICINE CLINIC | Facility: HOSPITAL | Age: 60
End: 2020-03-27

## 2020-03-27 RX ORDER — ONDANSETRON 4 MG/1
4 TABLET, ORALLY DISINTEGRATING ORAL EVERY 8 HOURS PRN
Qty: 20 TABLET | Refills: 0 | Status: SHIPPED | OUTPATIENT
Start: 2020-03-27 | End: 2020-04-20 | Stop reason: SDUPTHER

## 2020-03-27 RX ORDER — TRAMADOL HYDROCHLORIDE 50 MG/1
50 TABLET ORAL 2 TIMES DAILY
Qty: 20 TABLET | Refills: 0 | Status: SHIPPED | OUTPATIENT
Start: 2020-03-27 | End: 2020-04-07 | Stop reason: SDUPTHER

## 2020-03-30 PROBLEM — N18.6 END STAGE RENAL DISEASE (HCC): Status: RESOLVED | Noted: 2019-09-25 | Resolved: 2020-03-30

## 2020-04-07 ENCOUNTER — TRANSITIONAL CARE MANAGEMENT (OUTPATIENT)
Dept: FAMILY MEDICINE CLINIC | Facility: HOSPITAL | Age: 60
End: 2020-04-07

## 2020-04-07 DIAGNOSIS — M46.1 BILATERAL SACROILIITIS (HCC): ICD-10-CM

## 2020-04-07 DIAGNOSIS — K59.00 CONSTIPATION, UNSPECIFIED CONSTIPATION TYPE: ICD-10-CM

## 2020-04-08 RX ORDER — TRAMADOL HYDROCHLORIDE 50 MG/1
50 TABLET ORAL 2 TIMES DAILY
Qty: 20 TABLET | Refills: 0 | Status: SHIPPED | OUTPATIENT
Start: 2020-04-08 | End: 2020-04-20 | Stop reason: SDUPTHER

## 2020-04-15 ENCOUNTER — TELEPHONE (OUTPATIENT)
Dept: OBGYN CLINIC | Facility: CLINIC | Age: 60
End: 2020-04-15

## 2020-04-20 DIAGNOSIS — R53.1 GENERALIZED WEAKNESS: ICD-10-CM

## 2020-04-20 DIAGNOSIS — M46.1 BILATERAL SACROILIITIS (HCC): ICD-10-CM

## 2020-04-20 DIAGNOSIS — K21.9 GASTROESOPHAGEAL REFLUX DISEASE, ESOPHAGITIS PRESENCE NOT SPECIFIED: Primary | ICD-10-CM

## 2020-04-20 DIAGNOSIS — N39.0 URINARY TRACT INFECTION: ICD-10-CM

## 2020-04-20 RX ORDER — ONDANSETRON 4 MG/1
4 TABLET, ORALLY DISINTEGRATING ORAL EVERY 8 HOURS PRN
Qty: 20 TABLET | Refills: 0 | Status: SHIPPED | OUTPATIENT
Start: 2020-04-20 | End: 2020-04-27 | Stop reason: SDUPTHER

## 2020-04-20 RX ORDER — TRAMADOL HYDROCHLORIDE 50 MG/1
50 TABLET ORAL 2 TIMES DAILY
Qty: 60 TABLET | Refills: 0 | Status: SHIPPED | OUTPATIENT
Start: 2020-04-20 | End: 2020-05-11 | Stop reason: SDUPTHER

## 2020-04-21 RX ORDER — OMEPRAZOLE 40 MG/1
CAPSULE, DELAYED RELEASE ORAL
Qty: 90 CAPSULE | Refills: 1 | Status: SHIPPED | OUTPATIENT
Start: 2020-04-21 | End: 2020-08-20 | Stop reason: SDUPTHER

## 2020-04-22 ENCOUNTER — TELEPHONE (OUTPATIENT)
Dept: GASTROENTEROLOGY | Facility: CLINIC | Age: 60
End: 2020-04-22

## 2020-04-27 ENCOUNTER — TELEPHONE (OUTPATIENT)
Dept: FAMILY MEDICINE CLINIC | Facility: HOSPITAL | Age: 60
End: 2020-04-27

## 2020-04-27 ENCOUNTER — OFFICE VISIT (OUTPATIENT)
Dept: FAMILY MEDICINE CLINIC | Facility: HOSPITAL | Age: 60
End: 2020-04-27
Payer: MEDICARE

## 2020-04-27 VITALS
HEIGHT: 67 IN | BODY MASS INDEX: 28.72 KG/M2 | SYSTOLIC BLOOD PRESSURE: 158 MMHG | TEMPERATURE: 97.8 F | WEIGHT: 183 LBS | RESPIRATION RATE: 16 BRPM | HEART RATE: 86 BPM | DIASTOLIC BLOOD PRESSURE: 84 MMHG

## 2020-04-27 DIAGNOSIS — N39.0 URINARY TRACT INFECTION: ICD-10-CM

## 2020-04-27 DIAGNOSIS — R30.0 DYSURIA: ICD-10-CM

## 2020-04-27 DIAGNOSIS — M54.50 LOW BACK PAIN, UNSPECIFIED BACK PAIN LATERALITY, UNSPECIFIED CHRONICITY, UNSPECIFIED WHETHER SCIATICA PRESENT: ICD-10-CM

## 2020-04-27 DIAGNOSIS — N18.4 CKD (CHRONIC KIDNEY DISEASE) STAGE 4, GFR 15-29 ML/MIN (HCC): ICD-10-CM

## 2020-04-27 DIAGNOSIS — N30.00 ACUTE CYSTITIS WITHOUT HEMATURIA: Primary | ICD-10-CM

## 2020-04-27 DIAGNOSIS — N20.0 RENAL CALCULUS, LEFT: ICD-10-CM

## 2020-04-27 DIAGNOSIS — R53.1 GENERALIZED WEAKNESS: ICD-10-CM

## 2020-04-27 LAB
BACTERIA UR QL AUTO: ABNORMAL /HPF
BILIRUB UR QL STRIP: NEGATIVE
CLARITY UR: CLEAR
COLOR UR: YELLOW
GLUCOSE UR STRIP-MCNC: NEGATIVE MG/DL
HGB UR QL STRIP.AUTO: NEGATIVE
HYALINE CASTS #/AREA URNS LPF: ABNORMAL /LPF
KETONES UR STRIP-MCNC: NEGATIVE MG/DL
LEUKOCYTE ESTERASE UR QL STRIP: ABNORMAL
NITRITE UR QL STRIP: NEGATIVE
NON-SQ EPI CELLS URNS QL MICRO: ABNORMAL /HPF
PH UR STRIP.AUTO: 6.5 [PH]
PROT UR STRIP-MCNC: NEGATIVE MG/DL
RBC #/AREA URNS AUTO: ABNORMAL /HPF
SL AMB  POCT GLUCOSE, UA: ABNORMAL
SL AMB LEUKOCYTE ESTERASE,UA: ABNORMAL
SL AMB POCT BILIRUBIN,UA: ABNORMAL
SL AMB POCT BLOOD,UA: ABNORMAL
SL AMB POCT CLARITY,UA: ABNORMAL
SL AMB POCT COLOR,UA: YELLOW
SL AMB POCT KETONES,UA: ABNORMAL
SL AMB POCT NITRITE,UA: ABNORMAL
SL AMB POCT PH,UA: 5
SL AMB POCT SPECIFIC GRAVITY,UA: 1
SL AMB POCT URINE PROTEIN: ABNORMAL
SL AMB POCT UROBILINOGEN: ABNORMAL
SP GR UR STRIP.AUTO: 1.01 (ref 1–1.03)
UROBILINOGEN UR QL STRIP.AUTO: 0.2 E.U./DL
WBC #/AREA URNS AUTO: ABNORMAL /HPF

## 2020-04-27 PROCEDURE — 81001 URINALYSIS AUTO W/SCOPE: CPT | Performed by: INTERNAL MEDICINE

## 2020-04-27 PROCEDURE — 3008F BODY MASS INDEX DOCD: CPT | Performed by: INTERNAL MEDICINE

## 2020-04-27 PROCEDURE — 1036F TOBACCO NON-USER: CPT | Performed by: INTERNAL MEDICINE

## 2020-04-27 PROCEDURE — 99213 OFFICE O/P EST LOW 20 MIN: CPT | Performed by: INTERNAL MEDICINE

## 2020-04-27 PROCEDURE — 3077F SYST BP >= 140 MM HG: CPT | Performed by: INTERNAL MEDICINE

## 2020-04-27 PROCEDURE — 3079F DIAST BP 80-89 MM HG: CPT | Performed by: INTERNAL MEDICINE

## 2020-04-27 PROCEDURE — 81003 URINALYSIS AUTO W/O SCOPE: CPT | Performed by: INTERNAL MEDICINE

## 2020-04-27 RX ORDER — CIPROFLOXACIN 250 MG/1
250 TABLET, FILM COATED ORAL EVERY 24 HOURS
Qty: 7 TABLET | Refills: 0 | Status: SHIPPED | OUTPATIENT
Start: 2020-04-27 | End: 2020-04-27 | Stop reason: SDUPTHER

## 2020-04-27 RX ORDER — ONDANSETRON 4 MG/1
4 TABLET, ORALLY DISINTEGRATING ORAL EVERY 8 HOURS PRN
Qty: 20 TABLET | Refills: 0 | Status: SHIPPED | OUTPATIENT
Start: 2020-04-27 | End: 2020-04-28

## 2020-04-27 RX ORDER — CIPROFLOXACIN 250 MG/1
250 TABLET, FILM COATED ORAL EVERY 24 HOURS
Qty: 7 TABLET | Refills: 0 | Status: SHIPPED | OUTPATIENT
Start: 2020-04-27 | End: 2020-05-04

## 2020-04-28 DIAGNOSIS — N39.0 URINARY TRACT INFECTION: ICD-10-CM

## 2020-04-28 DIAGNOSIS — R53.1 GENERALIZED WEAKNESS: ICD-10-CM

## 2020-04-28 RX ORDER — ONDANSETRON 4 MG/1
TABLET, ORALLY DISINTEGRATING ORAL
Qty: 20 TABLET | Refills: 0 | Status: SHIPPED | OUTPATIENT
Start: 2020-04-28 | End: 2020-05-11 | Stop reason: SDUPTHER

## 2020-05-11 DIAGNOSIS — N39.0 URINARY TRACT INFECTION: ICD-10-CM

## 2020-05-11 DIAGNOSIS — R53.1 GENERALIZED WEAKNESS: ICD-10-CM

## 2020-05-11 DIAGNOSIS — F31.81 BIPOLAR 2 DISORDER (HCC): Chronic | ICD-10-CM

## 2020-05-11 DIAGNOSIS — M46.1 BILATERAL SACROILIITIS (HCC): ICD-10-CM

## 2020-05-12 RX ORDER — ONDANSETRON 4 MG/1
TABLET, ORALLY DISINTEGRATING ORAL
Qty: 60 TABLET | Refills: 1 | Status: SHIPPED | OUTPATIENT
Start: 2020-05-12 | End: 2020-07-17 | Stop reason: SDUPTHER

## 2020-05-12 RX ORDER — CLONAZEPAM 0.5 MG/1
0.5 TABLET ORAL 3 TIMES DAILY
Qty: 270 TABLET | Refills: 0 | Status: SHIPPED | OUTPATIENT
Start: 2020-05-12 | End: 2020-08-06 | Stop reason: SDUPTHER

## 2020-05-12 RX ORDER — TRAMADOL HYDROCHLORIDE 50 MG/1
50 TABLET ORAL 2 TIMES DAILY
Qty: 180 TABLET | Refills: 0 | Status: SHIPPED | OUTPATIENT
Start: 2020-05-12 | End: 2020-05-20 | Stop reason: SDUPTHER

## 2020-05-13 ENCOUNTER — TELEPHONE (OUTPATIENT)
Dept: FAMILY MEDICINE CLINIC | Facility: HOSPITAL | Age: 60
End: 2020-05-13

## 2020-05-13 DIAGNOSIS — J02.9 PHARYNGITIS, UNSPECIFIED ETIOLOGY: Primary | ICD-10-CM

## 2020-05-13 RX ORDER — AZITHROMYCIN 250 MG/1
250 TABLET, FILM COATED ORAL DAILY
Qty: 6 TABLET | Refills: 0 | Status: SHIPPED | OUTPATIENT
Start: 2020-05-13 | End: 2020-05-18

## 2020-05-16 ENCOUNTER — APPOINTMENT (EMERGENCY)
Dept: RADIOLOGY | Facility: HOSPITAL | Age: 60
End: 2020-05-16
Payer: MEDICARE

## 2020-05-16 ENCOUNTER — HOSPITAL ENCOUNTER (EMERGENCY)
Facility: HOSPITAL | Age: 60
Discharge: HOME | End: 2020-05-16
Attending: EMERGENCY MEDICINE
Payer: MEDICARE

## 2020-05-16 VITALS
BODY MASS INDEX: 28.25 KG/M2 | HEIGHT: 67 IN | RESPIRATION RATE: 18 BRPM | WEIGHT: 180 LBS | DIASTOLIC BLOOD PRESSURE: 79 MMHG | TEMPERATURE: 98 F | HEART RATE: 103 BPM | SYSTOLIC BLOOD PRESSURE: 154 MMHG | OXYGEN SATURATION: 97 %

## 2020-05-16 DIAGNOSIS — S93.601A RIGHT FOOT SPRAIN: Primary | ICD-10-CM

## 2020-05-16 DIAGNOSIS — S93.401A RIGHT ANKLE SPRAIN: ICD-10-CM
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
## 2020-05-16 PROCEDURE — 73590 X-RAY EXAM OF LOWER LEG: CPT

## 2020-05-16 PROCEDURE — 99283 EMERGENCY DEPT VISIT LOW MDM: CPT

## 2020-05-16 PROCEDURE — 73630 X-RAY EXAM OF FOOT: CPT

## 2020-05-16 PROCEDURE — 99283 EMERGENCY DEPT VISIT LOW MDM: CPT | Performed by: PHYSICIAN ASSISTANT

## 2020-05-16 RX ORDER — ACETAMINOPHEN 325 MG/1
650 TABLET ORAL ONCE
Status: COMPLETED | OUTPATIENT
Start: 2020-05-16 | End: 2020-05-16

## 2020-05-16 RX ADMIN — ACETAMINOPHEN 650 MG: 325 TABLET ORAL at 13:30

## 2020-05-18 ENCOUNTER — TELEPHONE (OUTPATIENT)
Dept: GASTROENTEROLOGY | Facility: CLINIC | Age: 60
End: 2020-05-18

## 2020-05-19 ENCOUNTER — VBI (OUTPATIENT)
Dept: FAMILY MEDICINE CLINIC | Facility: HOSPITAL | Age: 60
End: 2020-05-19

## 2020-05-19 ENCOUNTER — TELEPHONE (OUTPATIENT)
Dept: GASTROENTEROLOGY | Facility: CLINIC | Age: 60
End: 2020-05-19

## 2020-05-19 ENCOUNTER — OFFICE VISIT (OUTPATIENT)
Dept: GASTROENTEROLOGY | Facility: CLINIC | Age: 60
End: 2020-05-19
Payer: MEDICARE

## 2020-05-19 VITALS
WEIGHT: 180 LBS | DIASTOLIC BLOOD PRESSURE: 100 MMHG | HEIGHT: 67 IN | BODY MASS INDEX: 28.25 KG/M2 | HEART RATE: 108 BPM | TEMPERATURE: 98.5 F | SYSTOLIC BLOOD PRESSURE: 150 MMHG

## 2020-05-19 DIAGNOSIS — D12.6 BENIGN NEOPLASM OF COLON, UNSPECIFIED PART OF COLON: ICD-10-CM

## 2020-05-19 DIAGNOSIS — K59.00 CONSTIPATION, UNSPECIFIED CONSTIPATION TYPE: Primary | ICD-10-CM

## 2020-05-19 DIAGNOSIS — K62.3 RECTAL PROLAPSE: ICD-10-CM

## 2020-05-19 PROCEDURE — 1036F TOBACCO NON-USER: CPT | Performed by: INTERNAL MEDICINE

## 2020-05-19 PROCEDURE — 99214 OFFICE O/P EST MOD 30 MIN: CPT | Performed by: INTERNAL MEDICINE

## 2020-05-19 PROCEDURE — 3080F DIAST BP >= 90 MM HG: CPT | Performed by: INTERNAL MEDICINE

## 2020-05-19 PROCEDURE — 3008F BODY MASS INDEX DOCD: CPT | Performed by: INTERNAL MEDICINE

## 2020-05-19 PROCEDURE — 3077F SYST BP >= 140 MM HG: CPT | Performed by: INTERNAL MEDICINE

## 2020-05-20 DIAGNOSIS — M46.1 BILATERAL SACROILIITIS (HCC): ICD-10-CM

## 2020-05-20 RX ORDER — TRAMADOL HYDROCHLORIDE 50 MG/1
50 TABLET ORAL 2 TIMES DAILY
Qty: 180 TABLET | Refills: 0 | Status: SHIPPED | OUTPATIENT
Start: 2020-06-20 | End: 2020-05-27 | Stop reason: SDUPTHER

## 2020-05-21 ENCOUNTER — APPOINTMENT (OUTPATIENT)
Dept: RADIOLOGY | Facility: CLINIC | Age: 60
End: 2020-05-21
Payer: MEDICARE

## 2020-05-21 ENCOUNTER — OFFICE VISIT (OUTPATIENT)
Dept: OBGYN CLINIC | Facility: CLINIC | Age: 60
End: 2020-05-21
Payer: MEDICARE

## 2020-05-21 VITALS
HEIGHT: 67 IN | SYSTOLIC BLOOD PRESSURE: 136 MMHG | BODY MASS INDEX: 28.25 KG/M2 | DIASTOLIC BLOOD PRESSURE: 82 MMHG | HEART RATE: 84 BPM | WEIGHT: 180 LBS

## 2020-05-21 DIAGNOSIS — M79.671 PAIN IN RIGHT FOOT: ICD-10-CM

## 2020-05-21 DIAGNOSIS — S90.31XA CONTUSION OF RIGHT FOOT, INITIAL ENCOUNTER: ICD-10-CM

## 2020-05-21 DIAGNOSIS — S99.921A RIGHT FOOT INJURY, INITIAL ENCOUNTER: ICD-10-CM

## 2020-05-21 DIAGNOSIS — M79.671 PAIN IN RIGHT FOOT: Primary | ICD-10-CM

## 2020-05-21 PROCEDURE — 73620 X-RAY EXAM OF FOOT: CPT

## 2020-05-21 PROCEDURE — 99213 OFFICE O/P EST LOW 20 MIN: CPT | Performed by: ORTHOPAEDIC SURGERY

## 2020-05-21 RX ORDER — LAMOTRIGINE 200 MG/1
200 TABLET ORAL 2 TIMES DAILY
COMMUNITY
Start: 2020-04-13 | End: 2022-08-01 | Stop reason: SDUPTHER

## 2020-05-22 ENCOUNTER — TELEPHONE (OUTPATIENT)
Dept: NEPHROLOGY | Facility: CLINIC | Age: 60
End: 2020-05-22

## 2020-05-26 DIAGNOSIS — M46.1 BILATERAL SACROILIITIS (HCC): ICD-10-CM

## 2020-05-27 RX ORDER — TRAMADOL HYDROCHLORIDE 50 MG/1
50 TABLET ORAL 2 TIMES DAILY
Qty: 10 TABLET | Refills: 0 | Status: SHIPPED | OUTPATIENT
Start: 2020-06-20 | End: 2020-06-08 | Stop reason: SDUPTHER

## 2020-05-29 ENCOUNTER — APPOINTMENT (OUTPATIENT)
Dept: LAB | Facility: HOSPITAL | Age: 60
End: 2020-05-29
Attending: INTERNAL MEDICINE
Payer: MEDICARE

## 2020-05-29 DIAGNOSIS — I77.0 AVF (ARTERIOVENOUS FISTULA) (HCC): ICD-10-CM

## 2020-05-29 DIAGNOSIS — E21.3 HYPERPARATHYROIDISM (HCC): ICD-10-CM

## 2020-05-29 DIAGNOSIS — E83.39 HYPERPHOSPHATEMIA: ICD-10-CM

## 2020-05-29 DIAGNOSIS — N18.4 CKD (CHRONIC KIDNEY DISEASE) STAGE 4, GFR 15-29 ML/MIN (HCC): ICD-10-CM

## 2020-05-29 DIAGNOSIS — I10 ESSENTIAL HYPERTENSION: ICD-10-CM

## 2020-05-29 LAB
ERYTHROCYTE [DISTWIDTH] IN BLOOD BY AUTOMATED COUNT: 13.9 % (ref 11.6–15.1)
HCT VFR BLD AUTO: 37.1 % (ref 34.8–46.1)
HGB BLD-MCNC: 11.5 G/DL (ref 11.5–15.4)
MCH RBC QN AUTO: 32.2 PG (ref 26.8–34.3)
MCHC RBC AUTO-ENTMCNC: 31 G/DL (ref 31.4–37.4)
MCV RBC AUTO: 104 FL (ref 82–98)
PLATELET # BLD AUTO: 262 THOUSANDS/UL (ref 149–390)
PMV BLD AUTO: 11.4 FL (ref 8.9–12.7)
RBC # BLD AUTO: 3.57 MILLION/UL (ref 3.81–5.12)
WBC # BLD AUTO: 6 THOUSAND/UL (ref 4.31–10.16)

## 2020-05-29 PROCEDURE — 85027 COMPLETE CBC AUTOMATED: CPT

## 2020-06-01 ENCOUNTER — TRANSCRIBE ORDERS (OUTPATIENT)
Dept: LAB | Facility: HOSPITAL | Age: 60
End: 2020-06-01

## 2020-06-01 ENCOUNTER — TELEPHONE (OUTPATIENT)
Dept: NEPHROLOGY | Facility: CLINIC | Age: 60
End: 2020-06-01

## 2020-06-01 DIAGNOSIS — E21.3 HYPERPARATHYROIDISM (HCC): Primary | ICD-10-CM

## 2020-06-01 DIAGNOSIS — I77.0 AVF (ARTERIOVENOUS FISTULA) (HCC): ICD-10-CM

## 2020-06-01 DIAGNOSIS — N18.4 CKD (CHRONIC KIDNEY DISEASE) STAGE 4, GFR 15-29 ML/MIN (HCC): ICD-10-CM

## 2020-06-01 DIAGNOSIS — I10 ESSENTIAL HYPERTENSION: ICD-10-CM

## 2020-06-01 DIAGNOSIS — E83.39 HYPERPHOSPHATEMIA: ICD-10-CM

## 2020-06-02 ENCOUNTER — TELEMEDICINE (OUTPATIENT)
Dept: NEPHROLOGY | Facility: CLINIC | Age: 60
End: 2020-06-02
Payer: MEDICARE

## 2020-06-02 DIAGNOSIS — E21.3 HYPERPARATHYROIDISM (HCC): ICD-10-CM

## 2020-06-02 DIAGNOSIS — I31.3 PERICARDIAL EFFUSION: ICD-10-CM

## 2020-06-02 DIAGNOSIS — I10 ESSENTIAL HYPERTENSION: ICD-10-CM

## 2020-06-02 DIAGNOSIS — I77.0 AVF (ARTERIOVENOUS FISTULA) (HCC): ICD-10-CM

## 2020-06-02 DIAGNOSIS — N18.4 CKD (CHRONIC KIDNEY DISEASE) STAGE 4, GFR 15-29 ML/MIN (HCC): ICD-10-CM

## 2020-06-02 DIAGNOSIS — F31.4 BIPOLAR 1 DISORDER, DEPRESSED, SEVERE (HCC): ICD-10-CM

## 2020-06-02 DIAGNOSIS — N25.81 SECONDARY RENAL HYPERPARATHYROIDISM (HCC): Primary | ICD-10-CM

## 2020-06-02 PROCEDURE — 99443 PR PHYS/QHP TELEPHONE EVALUATION 21-30 MIN: CPT | Performed by: INTERNAL MEDICINE

## 2020-06-08 ENCOUNTER — TRANSITIONAL CARE MANAGEMENT (OUTPATIENT)
Dept: FAMILY MEDICINE CLINIC | Facility: HOSPITAL | Age: 60
End: 2020-06-08

## 2020-06-08 DIAGNOSIS — M46.1 BILATERAL SACROILIITIS (HCC): ICD-10-CM

## 2020-06-09 RX ORDER — TRAMADOL HYDROCHLORIDE 50 MG/1
50 TABLET ORAL 2 TIMES DAILY
Qty: 180 TABLET | Refills: 0 | Status: SHIPPED | OUTPATIENT
Start: 2020-06-20 | End: 2020-07-07

## 2020-06-11 ENCOUNTER — OFFICE VISIT (OUTPATIENT)
Dept: OBGYN CLINIC | Facility: CLINIC | Age: 60
End: 2020-06-11
Payer: MEDICARE

## 2020-06-11 VITALS
WEIGHT: 180 LBS | HEIGHT: 67 IN | SYSTOLIC BLOOD PRESSURE: 150 MMHG | DIASTOLIC BLOOD PRESSURE: 90 MMHG | BODY MASS INDEX: 28.25 KG/M2

## 2020-06-11 DIAGNOSIS — M20.11 HALLUX VALGUS, RIGHT: Primary | ICD-10-CM

## 2020-06-11 DIAGNOSIS — M20.11 ACQUIRED HALLUX INTERPHALANGEUS OF RIGHT FOOT: ICD-10-CM

## 2020-06-11 PROCEDURE — 3008F BODY MASS INDEX DOCD: CPT | Performed by: ORTHOPAEDIC SURGERY

## 2020-06-11 PROCEDURE — 1036F TOBACCO NON-USER: CPT | Performed by: ORTHOPAEDIC SURGERY

## 2020-06-11 PROCEDURE — 3077F SYST BP >= 140 MM HG: CPT | Performed by: ORTHOPAEDIC SURGERY

## 2020-06-11 PROCEDURE — 3080F DIAST BP >= 90 MM HG: CPT | Performed by: ORTHOPAEDIC SURGERY

## 2020-06-11 PROCEDURE — 99213 OFFICE O/P EST LOW 20 MIN: CPT | Performed by: ORTHOPAEDIC SURGERY

## 2020-06-17 ENCOUNTER — OFFICE VISIT (OUTPATIENT)
Dept: FAMILY MEDICINE CLINIC | Facility: HOSPITAL | Age: 60
End: 2020-06-17
Payer: MEDICARE

## 2020-06-17 VITALS
WEIGHT: 188 LBS | SYSTOLIC BLOOD PRESSURE: 138 MMHG | HEIGHT: 67 IN | OXYGEN SATURATION: 97 % | HEART RATE: 66 BPM | TEMPERATURE: 97.8 F | DIASTOLIC BLOOD PRESSURE: 80 MMHG | BODY MASS INDEX: 29.51 KG/M2

## 2020-06-17 DIAGNOSIS — M22.2X1 RIGHT PATELLOFEMORAL SYNDROME: ICD-10-CM

## 2020-06-17 DIAGNOSIS — E66.9 OBESITY (BMI 30.0-34.9): ICD-10-CM

## 2020-06-17 DIAGNOSIS — Z12.31 ENCOUNTER FOR SCREENING MAMMOGRAM FOR BREAST CANCER: ICD-10-CM

## 2020-06-17 DIAGNOSIS — M20.11 ACQUIRED HALLUX INTERPHALANGEUS OF RIGHT FOOT: ICD-10-CM

## 2020-06-17 DIAGNOSIS — E22.1 HYPERPROLACTINEMIA (HCC): ICD-10-CM

## 2020-06-17 DIAGNOSIS — J45.40 MODERATE PERSISTENT ASTHMA WITHOUT COMPLICATION: ICD-10-CM

## 2020-06-17 DIAGNOSIS — E21.3 HYPERPARATHYROIDISM (HCC): ICD-10-CM

## 2020-06-17 DIAGNOSIS — I63.9 INFARCTION OF LEFT BASAL GANGLIA (HCC): Primary | ICD-10-CM

## 2020-06-17 DIAGNOSIS — M46.1 BILATERAL SACROILIITIS (HCC): ICD-10-CM

## 2020-06-17 PROBLEM — E66.3 OVERWEIGHT: Status: ACTIVE | Noted: 2020-06-17

## 2020-06-17 PROBLEM — I31.39 PERICARDIAL EFFUSION: Status: RESOLVED | Noted: 2019-03-19 | Resolved: 2020-06-17

## 2020-06-17 PROBLEM — I31.3 PERICARDIAL EFFUSION: Status: RESOLVED | Noted: 2019-03-19 | Resolved: 2020-06-17

## 2020-06-17 PROBLEM — E66.811 OBESITY (BMI 30.0-34.9): Status: RESOLVED | Noted: 2019-08-14 | Resolved: 2020-06-17

## 2020-06-17 PROCEDURE — 3075F SYST BP GE 130 - 139MM HG: CPT | Performed by: INTERNAL MEDICINE

## 2020-06-17 PROCEDURE — 3008F BODY MASS INDEX DOCD: CPT | Performed by: INTERNAL MEDICINE

## 2020-06-17 PROCEDURE — 1036F TOBACCO NON-USER: CPT | Performed by: INTERNAL MEDICINE

## 2020-06-17 PROCEDURE — 99213 OFFICE O/P EST LOW 20 MIN: CPT | Performed by: INTERNAL MEDICINE

## 2020-06-17 PROCEDURE — 3079F DIAST BP 80-89 MM HG: CPT | Performed by: INTERNAL MEDICINE

## 2020-07-06 ENCOUNTER — OFFICE VISIT (OUTPATIENT)
Dept: PODIATRY | Facility: CLINIC | Age: 60
End: 2020-07-06
Payer: MEDICARE

## 2020-07-06 VITALS — BODY MASS INDEX: 29.51 KG/M2 | WEIGHT: 188 LBS | HEIGHT: 67 IN

## 2020-07-06 DIAGNOSIS — I10 HTN (HYPERTENSION): Primary | ICD-10-CM

## 2020-07-06 DIAGNOSIS — M46.1 BILATERAL SACROILIITIS (HCC): ICD-10-CM

## 2020-07-06 DIAGNOSIS — R79.89 ELEVATED LFTS: ICD-10-CM

## 2020-07-06 DIAGNOSIS — M20.11 HALLUX VALGUS, RIGHT: ICD-10-CM

## 2020-07-06 DIAGNOSIS — M21.611 BUNION, RIGHT: ICD-10-CM

## 2020-07-06 DIAGNOSIS — I10 ESSENTIAL HYPERTENSION: Primary | ICD-10-CM

## 2020-07-06 DIAGNOSIS — Z01.818 PREOP TESTING: Primary | ICD-10-CM

## 2020-07-06 PROCEDURE — 3075F SYST BP GE 130 - 139MM HG: CPT | Performed by: PODIATRIST

## 2020-07-06 PROCEDURE — 99214 OFFICE O/P EST MOD 30 MIN: CPT | Performed by: PODIATRIST

## 2020-07-06 PROCEDURE — 3079F DIAST BP 80-89 MM HG: CPT | Performed by: PODIATRIST

## 2020-07-06 PROCEDURE — 3008F BODY MASS INDEX DOCD: CPT | Performed by: PODIATRIST

## 2020-07-06 PROCEDURE — 1036F TOBACCO NON-USER: CPT | Performed by: PODIATRIST

## 2020-07-06 RX ORDER — AMILORIDE HYDROCHLORIDE 5 MG/1
TABLET ORAL
Qty: 120 TABLET | Refills: 0 | Status: SHIPPED | OUTPATIENT
Start: 2020-07-06 | End: 2020-09-08

## 2020-07-06 RX ORDER — ATORVASTATIN CALCIUM 40 MG/1
40 TABLET, FILM COATED ORAL DAILY
Qty: 30 TABLET | Refills: 5 | Status: CANCELLED | OUTPATIENT
Start: 2020-07-06

## 2020-07-07 DIAGNOSIS — R79.89 ELEVATED LFTS: ICD-10-CM

## 2020-07-07 DIAGNOSIS — Z01.818 PREOP TESTING: Primary | ICD-10-CM

## 2020-07-07 PROBLEM — M21.612 BUNION OF GREAT TOE OF LEFT FOOT: Status: RESOLVED | Noted: 2019-06-03 | Resolved: 2020-07-07

## 2020-07-07 RX ORDER — TRAMADOL HYDROCHLORIDE 50 MG/1
50 TABLET ORAL 2 TIMES DAILY
Qty: 180 TABLET | Refills: 0 | OUTPATIENT
Start: 2020-07-07

## 2020-07-07 RX ORDER — ATORVASTATIN CALCIUM 40 MG/1
40 TABLET, FILM COATED ORAL DAILY
Qty: 30 TABLET | Refills: 11 | Status: SHIPPED | OUTPATIENT
Start: 2020-07-07 | End: 2021-06-02 | Stop reason: SDUPTHER

## 2020-07-07 RX ORDER — AMILORIDE HYDROCHLORIDE 5 MG/1
5 TABLET ORAL 2 TIMES DAILY
Qty: 60 TABLET | Refills: 5 | OUTPATIENT
Start: 2020-07-07

## 2020-07-07 RX ORDER — TRAMADOL HYDROCHLORIDE 50 MG/1
TABLET ORAL
Qty: 60 TABLET | Refills: 0 | Status: SHIPPED | OUTPATIENT
Start: 2020-07-07 | End: 2020-07-30 | Stop reason: SDUPTHER

## 2020-07-07 NOTE — PROGRESS NOTES
This patient was seen on 7/6/20  Assessment:    Problem List Items Addressed This Visit        Musculoskeletal and Integument    Hallux valgus, right      Other Visit Diagnoses     Preop testing    -  Primary    Relevant Orders    PAT Covid Screening    CBC and differential    Basic metabolic panel    Ambulatory referral to [address] [place] [address], right        Relevant Orders    PAT Covid Screening    CBC and differential    Basic metabolic panel    Ambulatory referral to Family Practice    Case request operating room: BUNIONECTOMY RAFAEL (Completed)          Plan:  I was very clear at the beginning of the discussion about alternatives to this surgery including benign neglect, padding, wider shoes, and second surgical opinions  I spent time to discuss with the patient the surgical procedure(s) as bunionectomy with osteotomy (Serge-type) with screws, pre-op testing, and post-op course (6-8 weeks in a surgical shoe)required to properly heal the surgery  I discussed risks as infection, scar, swelling, chronic pain, painful or prominent hardware, possible need to remove hardware, poor healing of incision or bone that could require more surgery, incomplete correction of deformities or recurrence of deformities, change in shape of foot, toe, or walking and function, numbness, neuritis/RSD, blood clots in the leg or lung, and even death from anesthesia complications  No guarantees were given and the possibility of recurrent deformity or incomplete correction were discussed before patient signed the consent form  We also discussed the need for possible anticoagulation  The offloading device necessary after the surgery will be a surgical shoe  The surgery, history and physical and PATS will be scheduled  Diagnoses and all orders for this visit:    Preop testing  -     PAT Covid Screening; Future  -     CBC and differential; Future  -     Basic metabolic panel;  Future  -     Ambulatory referral to [facility]; Future    Bunion, right  -     PAT Covid Screening; Future  -     CBC and differential; Future  -     Basic metabolic panel; Future  -     Ambulatory referral to [facility]; Future  -     Case request operating room: BUNIONECTOMY RAFAEL; Standing  -     Case request operating room: BUNIONECTOMY RAFAEL    Hallux valgus, right    Other orders  -     Incentive spirometry; Standing  -     Insert and maintain IV line; Standing  -     Void On-Call to O R ; Standing  -     Electrocardiogram, 12-lead; Standing          Subjective:      Patient ID: Radha Hahn is a 64 y o  female  Thor Organ is here, known to me from Left foot bunionectomy  With regards to the left foot, she states "it's wonderful" and says she has no problems with it and can wear any desired shoe  Her Right foot bunion however is causing pain  I did review recent notes and saw she had a foot sprain / contusion and was cared for by Dr Kathryn Toribio  She states that is resolved now and she wants to move ahead with the foot surgery  The following portions of the patient's history were reviewed and updated as appropriate: allergies, current medications, past family history, past medical history, past social history, past surgical history and problem list     Review of Systems   Constitutional: Positive for activity change  Respiratory: Negative for chest tightness and shortness of breath  Cardiovascular: Negative for chest pain and leg swelling  Gastrointestinal: Negative for abdominal distention, abdominal pain, diarrhea, nausea and vomiting  Genitourinary: Negative for difficulty urinating  Musculoskeletal: Positive for arthralgias and gait problem  Neurological: Negative for numbness  Psychiatric/Behavioral: Negative  Objective:      Ht 5' 7" (1 702 m)   Wt 85 3 kg (188 lb)   BMI 29 44 kg/m²          Physical Exam   Constitutional: She is oriented to person, place, and time   She appears well-developed and well-nourished  No distress  HENT:   Head: Normocephalic  Eyes: Pupils are equal, round, and reactive to light  Neck: No JVD present  Cardiovascular:   Pulses:       Dorsalis pedis pulses are 3+ on the right side, and 3+ on the left side  Posterior tibial pulses are 3+ on the right side, and 3+ on the left side  Pulmonary/Chest: Effort normal and breath sounds normal    Abdominal: Soft  Bowel sounds are normal    Musculoskeletal: She exhibits tenderness and deformity  Feet:    Feet:   Right Foot:   Protective Sensation: 10 sites tested  10 sites sensed  Left Foot:   Protective Sensation: 10 sites tested  10 sites sensed  Neurological: She is alert and oriented to person, place, and time  Skin: Skin is warm and dry  Capillary refill takes less than 2 seconds  She is not diaphoretic  Psychiatric: She has a normal mood and affect  Nursing note and vitals reviewed  I note a moderate structural bunion with associated hallux abductovarus deformity on the RIght foot  The skin over the medial first metatarsal head is red and tender  I note a resolved deformity on the Left with a fine, non-hypertrophic scar, good ROM of the MTPJ and no edema /  Inflammation

## 2020-07-07 NOTE — PATIENT INSTRUCTIONS
Bunion   WHAT YOU NEED TO KNOW:   What is a bunion? A bunion is a bony lump at the base of your big toe  As it grows, it sticks out from the side of your foot and may move your toe out of place  What causes a bunion? Shoes that are too tight, too small, or have high heels are the most common cause of bunions  Arthritis can also cause bunions  Repeated stress on the toes or the front of the foot from sports or other activities can also cause bunions  What are the signs and symptoms of a bunion? · Foot pain and stiffness    · Big toe is turned inward and may overlap other toes    · A callus (thickened skin) at the base of the big toe that may have fluid under it  How is a bunion diagnosed? Your healthcare provider examine your foot  He may ask you to move your toe to see how well you can move it  You may need an x-ray to measure the bunion and see how your other toes are affected  How is a bunion treated? · Acetaminophen  decreases pain  It is available without a doctor's order  Ask how much to take and how often to take it  Follow directions  Acetaminophen can cause liver damage if not taken correctly  · NSAIDs , such as ibuprofen, help decrease swelling, pain, and fever  This medicine is available with or without a doctor's order  NSAIDs can cause stomach bleeding or kidney problems in certain people  If you take blood thinner medicine, always ask your healthcare provider if NSAIDs are safe for you  Always read the medicine label and follow directions  · A bunionectomy  is surgery to remove the bunion  You may need surgery if other treatments do not work  How can I manage my symptoms? · Use a bunion pad  Wear a thick, ring-shaped pad around and over the bunion to cushion it  · Wear shoes that fit well  Wear wide, low-heeled shoes that have plenty of room for your toes  Do not wear tight shoes or heels that are higher than 2 inches  · Wear shoe inserts or arch supports    These will decrease pressure on the bunion  · Separate your big toe at night  Separate the big toe from the others with a foam pad while you sleep  Use a light elastic bandage to keep the pad in place  · Stretch your foot each day  This will help decrease pressure and increase foot strength  Ask what foot exercises are best for you  · Apply ice  on your toe for 15 to 20 minutes every hour or as directed  Use an ice pack or put crushed ice in a plastic bag  Cover it with a towel  Ice helps prevent tissue damage and decreases swelling and pain  · Go to physical therapy if directed  A physical therapist teaches you exercises to help improve movement and strength, and to decrease pain  When should I seek immediate care? · You have severe pain in your toe  · You cannot put weight on your foot  When should I contact my healthcare provider? · You cannot do your daily activities because of the pain  · You have questions or concerns about your condition or care  CARE AGREEMENT:   You have the right to help plan your care  Learn about your health condition and how it may be treated  Discuss treatment options with your caregivers to decide what care you want to receive  You always have the right to refuse treatment  The above information is an  only  It is not intended as medical advice for individual conditions or treatments  Talk to your doctor, nurse or pharmacist before following any medical regimen to see if it is safe and effective for you  © 2017 [employer] Information is for End User's use only and may not be sold, redistributed or otherwise used for commercial purposes  All illustrations and images included in CareNotes® are the copyrighted property of [employer]  or Everett Murphy

## 2020-07-09 ENCOUNTER — TELEPHONE (OUTPATIENT)
Dept: GASTROENTEROLOGY | Facility: CLINIC | Age: 60
End: 2020-07-09

## 2020-07-09 NOTE — TELEPHONE ENCOUNTER
I contacted [employer] for patient's Linzess 290 mcg refill  3 bottles of qty 90 will be shipped to the office  The order number [id number]  We need to contact patient when delivered

## 2020-07-14 ENCOUNTER — TELEPHONE (OUTPATIENT)
Dept: FAMILY MEDICINE CLINIC | Facility: HOSPITAL | Age: 60
End: 2020-07-14

## 2020-07-15 NOTE — TELEPHONE ENCOUNTER
Pt c/o sore throat since last week, dry cough, body aches and pains  Headache  Has loss of taste and smell, but she states its from her medications  NO fever, NO sob, NO travel  Pt would like something called into [address] [facility]   Do you want to send her for covid testing?  Please advise DD

## 2020-07-15 NOTE — TELEPHONE ENCOUNTER
Symptoms are nonspecific and suggest a virus  Usee tylenol as needed or motrin, drink fluids, robitussin for cough and time and rest   I do think covid test whould be consdiered    Call back if any fever develops or cough becomes deep in chest

## 2020-07-17 DIAGNOSIS — N39.0 URINARY TRACT INFECTION: ICD-10-CM

## 2020-07-17 DIAGNOSIS — R53.1 GENERALIZED WEAKNESS: ICD-10-CM

## 2020-07-20 ENCOUNTER — TELEPHONE (OUTPATIENT)
Dept: PODIATRY | Facility: CLINIC | Age: 60
End: 2020-07-20

## 2020-07-20 DIAGNOSIS — R53.1 GENERALIZED WEAKNESS: ICD-10-CM

## 2020-07-20 DIAGNOSIS — N39.0 URINARY TRACT INFECTION: ICD-10-CM

## 2020-07-20 RX ORDER — ONDANSETRON 4 MG/1
TABLET, ORALLY DISINTEGRATING ORAL
Qty: 60 TABLET | Refills: 1 | Status: SHIPPED | OUTPATIENT
Start: 2020-07-20 | End: 2020-10-26 | Stop reason: SDUPTHER

## 2020-07-20 RX ORDER — ONDANSETRON 4 MG/1
TABLET, ORALLY DISINTEGRATING ORAL
Qty: 60 TABLET | Refills: 1 | Status: SHIPPED | OUTPATIENT
Start: 2020-07-20 | End: 2020-07-20 | Stop reason: SDUPTHER

## 2020-07-20 NOTE — TELEPHONE ENCOUNTER
Patient called in and LM with questions regarding her surgery  She wanted to know the date of her surgery and when she should get her labs and covid test done

## 2020-07-20 NOTE — TELEPHONE ENCOUNTER
Called and LM on both [phone] and [phone] regarding her questions regarding surgery  Explained for the patient to return my call to address her questions

## 2020-07-21 ENCOUNTER — APPOINTMENT (OUTPATIENT)
Dept: LAB | Facility: HOSPITAL | Age: 60
End: 2020-07-21
Attending: PODIATRIST
Payer: MEDICARE

## 2020-07-21 DIAGNOSIS — Z01.818 PREOP TESTING: ICD-10-CM

## 2020-07-21 DIAGNOSIS — M21.611 BUNION, RIGHT: ICD-10-CM

## 2020-07-21 LAB
ANION GAP SERPL CALCULATED.3IONS-SCNC: 5 MMOL/L (ref 4–13)
BASOPHILS # BLD AUTO: 0.1 THOUSANDS/ΜL (ref 0–0.1)
BASOPHILS NFR BLD AUTO: 2 % (ref 0–1)
BUN SERPL-MCNC: 37 MG/DL (ref 5–25)
CALCIUM SERPL-MCNC: 9.4 MG/DL (ref 8.3–10.1)
CHLORIDE SERPL-SCNC: 113 MMOL/L (ref 100–108)
CO2 SERPL-SCNC: 26 MMOL/L (ref 21–32)
CREAT SERPL-MCNC: 2.24 MG/DL (ref 0.6–1.3)
EOSINOPHIL # BLD AUTO: 0.49 THOUSAND/ΜL (ref 0–0.61)
EOSINOPHIL NFR BLD AUTO: 8 % (ref 0–6)
ERYTHROCYTE [DISTWIDTH] IN BLOOD BY AUTOMATED COUNT: 13.4 % (ref 11.6–15.1)
GFR SERPL CREATININE-BSD FRML MDRD: 23 ML/MIN/1.73SQ M
GLUCOSE P FAST SERPL-MCNC: 102 MG/DL (ref 65–99)
HCT VFR BLD AUTO: 38 % (ref 34.8–46.1)
HGB BLD-MCNC: 11.5 G/DL (ref 11.5–15.4)
IMM GRANULOCYTES # BLD AUTO: 0.01 THOUSAND/UL (ref 0–0.2)
IMM GRANULOCYTES NFR BLD AUTO: 0 % (ref 0–2)
LYMPHOCYTES # BLD AUTO: 1.3 THOUSANDS/ΜL (ref 0.6–4.47)
LYMPHOCYTES NFR BLD AUTO: 20 % (ref 14–44)
MCH RBC QN AUTO: 31.8 PG (ref 26.8–34.3)
MCHC RBC AUTO-ENTMCNC: 30.3 G/DL (ref 31.4–37.4)
MCV RBC AUTO: 105 FL (ref 82–98)
MONOCYTES # BLD AUTO: 0.53 THOUSAND/ΜL (ref 0.17–1.22)
MONOCYTES NFR BLD AUTO: 8 % (ref 4–12)
NEUTROPHILS # BLD AUTO: 4.05 THOUSANDS/ΜL (ref 1.85–7.62)
NEUTS SEG NFR BLD AUTO: 62 % (ref 43–75)
NRBC BLD AUTO-RTO: 0 /100 WBCS
PLATELET # BLD AUTO: 265 THOUSANDS/UL (ref 149–390)
PMV BLD AUTO: 11 FL (ref 8.9–12.7)
POTASSIUM SERPL-SCNC: 5.1 MMOL/L (ref 3.5–5.3)
RBC # BLD AUTO: 3.62 MILLION/UL (ref 3.81–5.12)
SODIUM SERPL-SCNC: 144 MMOL/L (ref 136–145)
WBC # BLD AUTO: 6.48 THOUSAND/UL (ref 4.31–10.16)

## 2020-07-21 PROCEDURE — 36415 COLL VENOUS BLD VENIPUNCTURE: CPT

## 2020-07-21 PROCEDURE — 85025 COMPLETE CBC W/AUTO DIFF WBC: CPT

## 2020-07-21 PROCEDURE — 80048 BASIC METABOLIC PNL TOTAL CA: CPT

## 2020-07-22 NOTE — TELEPHONE ENCOUNTER
Called pt to notify her the [facility] samples have arrived  (3 bottles of 30 capsules each, lot # [id number])  She will  tomorrow

## 2020-07-22 NOTE — TELEPHONE ENCOUNTER
Pt left [facility] stating new ph# is [phone]; asks for CB about whether or not Otto Centeno is in yet? Updated ph# in demographics

## 2020-07-27 ENCOUNTER — ANESTHESIA EVENT (OUTPATIENT)
Dept: PERIOP | Facility: HOSPITAL | Age: 60
End: 2020-07-27
Payer: MEDICARE

## 2020-07-27 DIAGNOSIS — Z01.818 PREOP TESTING: ICD-10-CM

## 2020-07-27 DIAGNOSIS — M21.611 BUNION, RIGHT: ICD-10-CM

## 2020-07-27 PROCEDURE — U0003 INFECTIOUS AGENT DETECTION BY NUCLEIC ACID (DNA OR RNA); SEVERE ACUTE RESPIRATORY SYNDROME CORONAVIRUS 2 (SARS-COV-2) (CORONAVIRUS DISEASE [COVID-19]), AMPLIFIED PROBE TECHNIQUE, MAKING USE OF HIGH THROUGHPUT TECHNOLOGIES AS DESCRIBED BY CMS-2020-01-R: HCPCS

## 2020-07-27 NOTE — PRE-PROCEDURE INSTRUCTIONS
Pre-Surgery Instructions:   Medication Instructions    acetaminophen (TYLENOL) 325 mg tablet Instructed patient per Anesthesia Guidelines   albuterol (PROAIR HFA) 90 mcg/act inhaler Instructed patient per Anesthesia Guidelines   AMILoride 5 mg tablet Instructed patient per Anesthesia Guidelines   aspirin (ECOTRIN LOW STRENGTH) 81 mg EC tablet Patient was instructed to contact Physician for medication instruction   atorvastatin (LIPITOR) 40 mg tablet Instructed patient per Anesthesia Guidelines   budesonide-formoterol (SYMBICORT) 160-4 5 mcg/act inhaler Instructed patient per Anesthesia Guidelines   carvedilol (COREG) 3 125 mg tablet Instructed patient per Anesthesia Guidelines   clonazePAM (KlonoPIN) 0 5 mg tablet Instructed patient per Anesthesia Guidelines   fluocinonide (LIDEX) 0 05 % ointment Instructed patient per Anesthesia Guidelines   fluvoxaMINE (LUVOX) 100 mg tablet Instructed patient per Anesthesia Guidelines   lamoTRIgine (LaMICtal) 200 MG tablet Instructed patient per Anesthesia Guidelines   linaCLOtide (Linzess) 290 MCG CAPS Instructed patient per Anesthesia Guidelines   omeprazole (PriLOSEC) 40 MG capsule Instructed patient per Anesthesia Guidelines   ondansetron (ZOFRAN-ODT) 4 mg disintegrating tablet Instructed patient per Anesthesia Guidelines   Polyethylene Glycol 3350 (MIRALAX PO) Instructed patient per Anesthesia Guidelines   traMADol (ULTRAM) 50 mg tablet Instructed patient per Anesthesia Guidelines  Pre-op Showering Instructions for Surgery Patients    Before your operation, you play an important role in decreasing your risk for infection by washing with special antiseptic soap  This is an effective way to reduce bacteria on the skin which may help to prevent infections at the surgical site  Please read the following directions in advance    1  In the week before your operation, purchase a 4 ounce bottle of antiseptic soap containing chlorhexidine gluconate (CHG)  4%  Some brand names include: Aplicare®, Endure, and Hibiclens®  The cost is usually less than $5 00   For your convenience, the [employer] carries the soap   It may also be available at your doctors office or pre-admission testing center, and at most retail pharmacies   If you are allergic or sensitive to soaps containing CHG, please let your doctor know so another antiseptic can be suggested   CHG antiseptic soap is for external use only  2  The day before your operation, follow these instructions carefully to get ready   Please clean linens (sheets) on your bed; you should sleep on clean sheets after your evening shower   Get clean towels and washcloth ready - you need enough for 2 showers   Set aside clean underwear, pajamas, and clothing to wear after the showers     Reminders:   DO NOT use any other soap or body rinse on your skin during or after the antiseptic showers   DO NOT use lotion, powder, deodorant, or perfume/aftershave of any kind on your skin after your antiseptic shower   DO NOT shave any body parts in the 24 hours/day before your operation   DO NOT get the antiseptic soap in your eyes, ears, nose, mouth, or vaginal area    3  You will need to shower the night before AND the morning of your surgery  Shower 1:   The first evening before the operation, take the first shower   First, shampoo your hair with regular shampoo and rinse it completely before you use the antiseptic soap  After washing and rinsing your hair, rinse your body   Next, use a clean washcloth to apply the antiseptic soap and wash your body from the neck down to your toes using ½ bottle of the antiseptic soap   You will use the other ½ bottle for the second shower   Clean the area where your incision will be; lather this area well for about 2 minutes   If you are having head or neck surgery, wash areas with the antiseptic soap   Rinse yourself completely with running water   Use a clean towel to dry off   Wear clean underwear and clothing/pajamas  Shower 2   The morning of your operation, take the second shower following the same steps as Shower 1 using the second ½ of the bottle of antiseptic soap   Use clean cloths and towels to wash and dry yourself   Wear clean underwear and clothing  You will receive a phone call from hospital for arrival time  Please call surgeons office if any changes in your condition  Wear easy on/off clothing; consider type of surgery;  valuables and jewelry please keep at home  **COVID-19  education done  Please: No contacts or eye make up or artificial eyelashes    Please bring special ordered sling or braces if needed for  Your particular surgery  Has crutches, will bring boot  Please secure transportation     Follow pre surgery showering or cleaning instructions as  Reviewed by nurse or surgeons office      Questions answered and concerns addressed

## 2020-07-28 LAB — SARS-COV-2 RNA SPEC QL NAA+PROBE: NOT DETECTED

## 2020-07-30 ENCOUNTER — OFFICE VISIT (OUTPATIENT)
Dept: FAMILY MEDICINE CLINIC | Facility: HOSPITAL | Age: 60
End: 2020-07-30
Payer: MEDICARE

## 2020-07-30 VITALS
DIASTOLIC BLOOD PRESSURE: 72 MMHG | WEIGHT: 198 LBS | HEIGHT: 67 IN | HEART RATE: 80 BPM | BODY MASS INDEX: 31.08 KG/M2 | TEMPERATURE: 98.2 F | SYSTOLIC BLOOD PRESSURE: 130 MMHG | RESPIRATION RATE: 16 BRPM

## 2020-07-30 DIAGNOSIS — Z01.818 PREOPERATIVE EXAMINATION: Primary | ICD-10-CM

## 2020-07-30 DIAGNOSIS — M21.619 BUNION OF GREAT TOE: ICD-10-CM

## 2020-07-30 DIAGNOSIS — M46.1 BILATERAL SACROILIITIS (HCC): ICD-10-CM

## 2020-07-30 PROCEDURE — 1036F TOBACCO NON-USER: CPT | Performed by: PHYSICIAN ASSISTANT

## 2020-07-30 PROCEDURE — 3008F BODY MASS INDEX DOCD: CPT | Performed by: PHYSICIAN ASSISTANT

## 2020-07-30 PROCEDURE — 3078F DIAST BP <80 MM HG: CPT | Performed by: PHYSICIAN ASSISTANT

## 2020-07-30 PROCEDURE — 3075F SYST BP GE 130 - 139MM HG: CPT | Performed by: PHYSICIAN ASSISTANT

## 2020-07-30 PROCEDURE — 99214 OFFICE O/P EST MOD 30 MIN: CPT | Performed by: PHYSICIAN ASSISTANT

## 2020-07-30 RX ORDER — QUETIAPINE FUMARATE 400 MG/1
400 TABLET, FILM COATED ORAL
COMMUNITY

## 2020-07-30 RX ORDER — TRAMADOL HYDROCHLORIDE 50 MG/1
50 TABLET ORAL 2 TIMES DAILY
Qty: 60 TABLET | Refills: 3 | Status: SHIPPED | OUTPATIENT
Start: 2020-07-30 | End: 2020-08-28 | Stop reason: SDUPTHER

## 2020-07-30 RX ORDER — QUETIAPINE FUMARATE 100 MG/1
1 TABLET, FILM COATED ORAL
COMMUNITY

## 2020-07-30 NOTE — PROGRESS NOTES
Assessment/Plan:         Diagnoses and all orders for this visit:    Pre-procedure evaluation-  Cleared for procedure  Bilateral sacroiliitis (HCC)  -     traMADol (ULTRAM) 50 mg tablet; Take 1 tablet (50 mg total) by mouth 2 (two) times a day    Other orders  -     QUEtiapine (SEROquel) 100 mg tablet; 1 at bedtime  -     QUEtiapine (SEROquel) 400 MG tablet; 1 at bedtime     (also takes 100 mg and 300 mg tabs)        Subjective:      Patient ID: Gisselle Templeton is a 64 y o  female  64year old white female presents for pre op evaluation  Scheduled to have bunion surgery August 3rd , 2020  Review of Systems   Constitutional: Negative for chills, diaphoresis, fatigue and fever  HENT: Negative for congestion, ear pain, rhinorrhea and sore throat  Respiratory: Negative for cough, chest tightness and shortness of breath  Gastrointestinal: Negative for abdominal pain, constipation, diarrhea, nausea and vomiting  Musculoskeletal: Positive for arthralgias, back pain and myalgias  Negative for neck pain and neck stiffness  Neurological: Negative for dizziness, tremors, weakness, light-headedness, numbness and headaches  Objective:      /72   Pulse 80   Temp 98 2 °F (36 8 °C) (Tympanic)   Resp 16   Ht 5' 7" (1 702 m)   Wt 89 8 kg (198 lb)   BMI 31 01 kg/m²          Physical Exam   Constitutional: She is oriented to person, place, and time  She appears well-developed and well-nourished  No distress  HENT:   Head: Normocephalic and atraumatic  Eyes: EOM are normal    Cardiovascular: Normal rate, regular rhythm and normal heart sounds  Pulmonary/Chest: Effort normal and breath sounds normal  No stridor  No respiratory distress  She has no wheezes  She has no rales  Musculoskeletal: Normal range of motion  She exhibits deformity  She exhibits no edema or tenderness  Has bunion right foot  Neurological: She is alert and oriented to person, place, and time   No cranial nerve deficit  Coordination normal    Skin: She is not diaphoretic  Psychiatric: She has a normal mood and affect  Her behavior is normal  Judgment and thought content normal    Nursing note and vitals reviewed

## 2020-07-30 NOTE — PATIENT INSTRUCTIONS
Recommend vitamin D 4000 IU daily, reduce prilosec to over the counter 20 mg  Daily, then slowly come off medication  Consider [id number] mg  For muscle pain; also omega 3 fish oil twice a day  Cleared for procedure

## 2020-08-03 ENCOUNTER — APPOINTMENT (OUTPATIENT)
Dept: RADIOLOGY | Facility: HOSPITAL | Age: 60
End: 2020-08-03
Payer: MEDICARE

## 2020-08-03 ENCOUNTER — HOSPITAL ENCOUNTER (OUTPATIENT)
Facility: HOSPITAL | Age: 60
Setting detail: OUTPATIENT SURGERY
Discharge: HOME | End: 2020-08-03
Attending: PODIATRIST | Admitting: PODIATRIST
Payer: MEDICARE

## 2020-08-03 ENCOUNTER — ANESTHESIA (OUTPATIENT)
Dept: PERIOP | Facility: HOSPITAL | Age: 60
End: 2020-08-03
Payer: MEDICARE

## 2020-08-03 VITALS
HEIGHT: 67 IN | OXYGEN SATURATION: 100 % | WEIGHT: 193.8 LBS | RESPIRATION RATE: 23 BRPM | DIASTOLIC BLOOD PRESSURE: 86 MMHG | BODY MASS INDEX: 30.42 KG/M2 | SYSTOLIC BLOOD PRESSURE: 173 MMHG | HEART RATE: 72 BPM | TEMPERATURE: 98.3 F

## 2020-08-03 DIAGNOSIS — M20.11 HALLUX VALGUS, RIGHT: Primary | ICD-10-CM

## 2020-08-03 PROCEDURE — 99024 POSTOP FOLLOW-UP VISIT: CPT | Performed by: PODIATRIST

## 2020-08-03 PROCEDURE — C1769 GUIDE WIRE: HCPCS | Performed by: PODIATRIST

## 2020-08-03 PROCEDURE — C1713 ANCHOR/SCREW BN/BN,TIS/BN: HCPCS | Performed by: PODIATRIST

## 2020-08-03 PROCEDURE — 28296 COR HLX VLGS DSTL MTAR OSTEO: CPT | Performed by: PODIATRIST

## 2020-08-03 PROCEDURE — 73630 X-RAY EXAM OF FOOT: CPT

## 2020-08-03 DEVICE — SCREW COMP 3.5 X 16MM MINI FT: Type: IMPLANTABLE DEVICE | Site: FOOT | Status: FUNCTIONAL

## 2020-08-03 RX ORDER — MIDAZOLAM HYDROCHLORIDE 2 MG/2ML
INJECTION, SOLUTION INTRAMUSCULAR; INTRAVENOUS AS NEEDED
Status: DISCONTINUED | OUTPATIENT
Start: 2020-08-03 | End: 2020-08-03

## 2020-08-03 RX ORDER — LIDOCAINE HYDROCHLORIDE 10 MG/ML
0.5 INJECTION, SOLUTION EPIDURAL; INFILTRATION; INTRACAUDAL; PERINEURAL ONCE AS NEEDED
Status: DISCONTINUED | OUTPATIENT
Start: 2020-08-03 | End: 2020-08-03 | Stop reason: HOSPADM

## 2020-08-03 RX ORDER — FENTANYL CITRATE/PF 50 MCG/ML
25 SYRINGE (ML) INJECTION
Status: DISCONTINUED | OUTPATIENT
Start: 2020-08-03 | End: 2020-08-03 | Stop reason: HOSPADM

## 2020-08-03 RX ORDER — ACETAMINOPHEN 325 MG/1
650 TABLET ORAL EVERY 6 HOURS PRN
Status: DISCONTINUED | OUTPATIENT
Start: 2020-08-03 | End: 2020-08-03 | Stop reason: HOSPADM

## 2020-08-03 RX ORDER — OXYCODONE HYDROCHLORIDE AND ACETAMINOPHEN 5; 325 MG/1; MG/1
1 TABLET ORAL EVERY 6 HOURS PRN
Qty: 16 TABLET | Refills: 0 | Status: SHIPPED | OUTPATIENT
Start: 2020-08-03 | End: 2020-08-07 | Stop reason: SDUPTHER

## 2020-08-03 RX ORDER — FENTANYL CITRATE 50 UG/ML
INJECTION, SOLUTION INTRAMUSCULAR; INTRAVENOUS AS NEEDED
Status: DISCONTINUED | OUTPATIENT
Start: 2020-08-03 | End: 2020-08-03

## 2020-08-03 RX ORDER — LIDOCAINE HYDROCHLORIDE 10 MG/ML
INJECTION, SOLUTION EPIDURAL; INFILTRATION; INTRACAUDAL; PERINEURAL AS NEEDED
Status: DISCONTINUED | OUTPATIENT
Start: 2020-08-03 | End: 2020-08-03 | Stop reason: HOSPADM

## 2020-08-03 RX ORDER — BUPIVACAINE HYDROCHLORIDE 2.5 MG/ML
INJECTION, SOLUTION EPIDURAL; INFILTRATION; INTRACAUDAL AS NEEDED
Status: DISCONTINUED | OUTPATIENT
Start: 2020-08-03 | End: 2020-08-03 | Stop reason: HOSPADM

## 2020-08-03 RX ORDER — ONDANSETRON 2 MG/ML
4 INJECTION INTRAMUSCULAR; INTRAVENOUS ONCE
Status: DISCONTINUED | OUTPATIENT
Start: 2020-08-03 | End: 2020-08-03 | Stop reason: HOSPADM

## 2020-08-03 RX ORDER — SODIUM CHLORIDE, SODIUM LACTATE, POTASSIUM CHLORIDE, CALCIUM CHLORIDE 600; 310; 30; 20 MG/100ML; MG/100ML; MG/100ML; MG/100ML
75 INJECTION, SOLUTION INTRAVENOUS CONTINUOUS
Status: DISCONTINUED | OUTPATIENT
Start: 2020-08-03 | End: 2020-08-03 | Stop reason: HOSPADM

## 2020-08-03 RX ORDER — OXYCODONE HYDROCHLORIDE AND ACETAMINOPHEN 5; 325 MG/1; MG/1
1 TABLET ORAL EVERY 4 HOURS PRN
Status: DISCONTINUED | OUTPATIENT
Start: 2020-08-03 | End: 2020-08-03 | Stop reason: HOSPADM

## 2020-08-03 RX ORDER — PROPOFOL 10 MG/ML
INJECTION, EMULSION INTRAVENOUS CONTINUOUS PRN
Status: DISCONTINUED | OUTPATIENT
Start: 2020-08-03 | End: 2020-08-03

## 2020-08-03 RX ORDER — CEFAZOLIN SODIUM 2 G/50ML
2000 SOLUTION INTRAVENOUS ONCE
Status: COMPLETED | OUTPATIENT
Start: 2020-08-03 | End: 2020-08-03

## 2020-08-03 RX ADMIN — CEFAZOLIN SODIUM 2000 MG: 2 SOLUTION INTRAVENOUS at 12:40

## 2020-08-03 RX ADMIN — OXYCODONE HYDROCHLORIDE AND ACETAMINOPHEN 1 TABLET: 5; 325 TABLET ORAL at 14:14

## 2020-08-03 RX ADMIN — FENTANYL CITRATE 100 MCG: 50 INJECTION, SOLUTION INTRAMUSCULAR; INTRAVENOUS at 12:43

## 2020-08-03 RX ADMIN — PROPOFOL 100 MCG/KG/MIN: 10 INJECTION, EMULSION INTRAVENOUS at 12:43

## 2020-08-03 RX ADMIN — MIDAZOLAM 2 MG: 1 INJECTION INTRAMUSCULAR; INTRAVENOUS at 12:43

## 2020-08-03 RX ADMIN — SODIUM CHLORIDE, SODIUM LACTATE, POTASSIUM CHLORIDE, AND CALCIUM CHLORIDE: .6; .31; .03; .02 INJECTION, SOLUTION INTRAVENOUS at 11:38

## 2020-08-03 NOTE — OP NOTE
OPERATIVE REPORT - Podiatry  PATIENT NAME: Olam Nyhan    :  1960  MRN: [id number]  Pt Location:  OR ROOM 02    SURGERY DATE: 8/3/2020    Surgeon(s) and Role:     * Trevor Lobo DPM - Primary     * Mitchel Alford DPM - Assisting    Pre-op Diagnosis:  Bunion, right [M21 611]    Post-Op Diagnosis Codes:     * Bunion, right [M21 611]    Procedure(s) (LRB):  BUNIONECTOMY RAFAEL (Right)  Code CPT 26996 was modified to: Bunionectomy with distal metatarsal osteotomy  Specimen(s):  * No specimens in log *    Estimated Blood Loss:   Minimal    Drains:  * No LDAs found *    Anesthesia Type:   IV Sedation with Anesthesia with 15 ml of 1% Lidocaine and 0 25% Bupivacaine in a 1:1 mixture    Hemostasis:  Right pneumatic ankle tourniquet at 250 mmHg for 42 minutes    Materials:  Implant Name Type Inv  Item Serial No   Lot No  LRB No  Used Action   SCREW COMP 3 5 X 16MM MINI FT - [id number]  SCREW COMP 3 5 X 16MM MINI FT  [employer]  Right 2 Implanted     2-0 Vicryl, 3-0 Vicryl, 4-0 nylon    Operative Findings:  Consistent with diagnosis  Intraoperativelymedial distal metatarsal shaft bont cyst was located, bone graft was not needed due to screws and osteotomy being in far proximity  Complications:   None    Procedure and Technique:     Under mild sedation, the patient was brought into the operating room and placed on the operating room table in the supine position  IV sedation was achieved by anesthesia team and a universal timeout was performed where all parties are in agreement of correct patient, correct procedure and correct site  A pneumatic tourniquet was then placed over the patient's right lower extremity with ample padding  A Maria block was performed consisting of 15 ml of 1% Lidocaine and 0 25% Bupivacaine in a 1:1 mixture  The foot was then prepped and draped in the usual aseptic manner   An esmarch bandage was used to exsangunate the foot and the pneumatic tourniquet was then inflated to 250mmHg  Attention was then directed to the dorsal aspect of the first metatarsal where an approximately 6 cm linear incision was made  The incision was deepened through the subcutaneous tissues using sharp and blunt dissection  Care was taken to identify and retract all vital neural and vascular structures  All bleeders were cauterized and ligated as necessary  A capsuloptomy was performed over the dorsal aspect of the MPJ  The periosteal and capsular structures were then carefully dissected free of their osseous attachments and reflected medially and laterally, thus exposing the head of the first metatarsal at the operative site  Attention was then directed to the first met head where the medial prominence was resected by the sagittal bone saw  A k-wire was used as a guidewire at the medial aspect of the 1st met head  A through and through V type osteotomy was made at a 55 degree angle  This cut was created in the metataphyseal region of the bone utilizing a sagittal bone saw and the apices of this osteotomy pointing proximal plantarly and proximal dorsally  Upon completion of this osteotomy, the capital fragment was distracted and shifted laterally into a more corrected position and impacted onto the shaft of the first met  K wires were used as temp fixation across the osteotomy site  With proper AO technique an Arthrex 3 5x16mm headless compression screws were used for fixation across the osteotomy site  Attention was directed to the remaining medial bone shelf proximal to the osteotomy site which was resected using a sagittal saw and passed from operative field  Correction of the deformity was assessed at this time and noted to be adequate  Of note, medial distal metatarsal shaft bont cyst was located, however bone grafting was not needed due to cyst being in far proximity of the osteotomy and screws       Attention was then directed to the 1st interspace via the original skin incision where the dissection was continued deep using sharp dissection down to the level of the fibular sesamoid which was free from its soft tissue attachments proximally, laterally and distally  The conjoined tendon of the adductor halluces was then identified and transected at its attachment  At this time the lateral contraction presents on the hallux was noted to be reduced and the sesamoid apparatus was noted to float into a more corrected medial position  The wound was then flushed with copious amounts of sterile saline  The periosteal and capsular structures were reapproximated using 2-0 Vicryl  The subQ tissues were reapproximated using 3-0 Vicryl and the skin was reapproximated using 4-0 Nylon in a horizontal mattress suture technique  The incision was then dressed with adaptic, Dry sterile dressing  The pneumatic ankle tourniquet was then deflated and a prompt hyperemic response was noted to all digits of the foot  The patient tolerated the procedure and anesthesia well and was transferred to the PACU with vital signs stable  Dr Steven Short was present during the entire procedure and participated in all key aspects  Bita Zuniga DPM  DATE: August 3, 2020  TIME: 1:50 PM      Portions of the record may have been created with voice recognition software  Occasional wrong word or "sound a like" substitutions may have occurred due to the inherent limitations of voice recognition software  Read the chart carefully and recognize, using context, where substitutions have occurred

## 2020-08-03 NOTE — ANESTHESIA PREPROCEDURE EVALUATION
Procedure:  BUNIONECTOMY RAFAEL (Right Foot)    Relevant Problems   CARDIO   (+) Cardiac murmur   (+) Essential hypertension   (+) Hypercholesteremia      ENDO   (+) Hyperparathyroidism (HCC)   (+) Secondary renal hyperparathyroidism (HCC)      /RENAL   (+) CKD (chronic kidney disease) stage 4, GFR 15-29 ml/min (HCC)   (+) Renal cyst      MUSCULOSKELETAL   (+) Bilateral sacroiliitis (HCC)   (+) Primary osteoarthritis of right knee      NEURO/PSYCH   (+) Infarction of left basal ganglia (HCC)   (+) Obsessive compulsive disorder   (+) Panic disorder with agoraphobia      PULMONARY   (+) Moderate persistent asthma without complication   (+) Shortness of breath        Physical Exam    Airway    Mallampati score: II  TM Distance: >3 FB  Neck ROM: full     Dental   upper dentures and lower dentures,     Cardiovascular  Rhythm: regular, Rate: normal, Cardiovascular exam normal    Pulmonary  Pulmonary exam normal Breath sounds clear to auscultation,     Other Findings        Anesthesia Plan  ASA Score- 3     Anesthesia Type- IV sedation with anesthesia with ASA Monitors  Additional Monitors:   Airway Plan:           Plan Factors-    Chart reviewed  EKG reviewed  Existing labs reviewed  Patient summary reviewed  Patient is not a current smoker  Induction- intravenous  Postoperative Plan- Plan for postoperative opioid use  Informed Consent- Anesthetic plan and risks discussed with patient  I personally reviewed this patient with the CRNA  Discussed and agreed on the Anesthesia Plan with the CRNA  Dimitry Choudhury

## 2020-08-03 NOTE — DISCHARGE SUMMARY
Discharge Summary Outpatient Procedure Podiatry -   Jair Garcia 64 y o  female MRN: [id number]  Unit/Bed#: OR POOL Encounter: [id number]    Admission Date: 8/3/2020     Admitting Diagnosis: Max right [M21 611]    Discharge Diagnosis: same    Procedures Performed: BUNIONECTOMY RAFAEL: 60702 (CPT®)    Complications: none    Condition at Discharge: stable    Discharge instructions/Information to patient and family:   See after visit summary for information provided to patient and family  Provisions for Follow-Up Care/Important appointments:  See after visit summary for information related to follow-up care and any pertinent home health orders  Discharge Medications:  See after visit summary for reconciled discharge medications provided to patient and family

## 2020-08-03 NOTE — DISCHARGE INSTRUCTIONS
Dr Gayathri Salguero Instructions    1  Take your prescribed medication as directed  2  Upon arrival at home, lie down and elevate your surgical foot on 2 pillows  3  Remain quiet, off your feet as much as possible, for the first 24-48 hours  This is when your feet first swell and may become painful  After 48 hours you may begin limited walking following these restrictions:   Weightbear as tolerated to surgical foot to heel with surgical shoe and crutches   4  Drink large quantities of water  Consume no alcohol  Continue a well-balanced diet  5  Report any unusual discomfort or fever to this office  6  A limited amount of discomfort and swelling is to be expected  In some cases the skin may take on a bruised appearance  The surgical solution that was applied to your foot prior to the operation is dark in color and the operation site may appear to be oozing when it actually is not  7  A slight amount of blood is to be expected, and is no cause for alarm  Do not remove the dressings  If there is active bleeding and if the bleeding persists, add additional gauze to the bandage, apply direct pressure, elevate your feet and call this office  8  Do not get the dressings wet  As regular bathing may be inconvenient, sponge baths are recommended  9  When anesthesia wears off and if any discomfort should be present, apply an ice pack directly over the operated area for 15 minute intervals for several hours or until the pain leaves  (USE IN EXCESS OF 15 MINUTES COULD CAUSE FROSTBITE)  Do not use hot water bags or electric pads  A convenient icepack can be made by placing ice cubes in a plastic bag and covering this with a towel  10  If necessary, take a mild laxative before retiring  11  Wear your special open shoes anytime you put weight on your foot, even if it is just to walk to the bathroom and back  It will probably be 2 or 3 weeks before you will be permitted to try regular shoes    12  Having performed the operation, we are interested in a prompt recovery  Please cooperate by following the above instructions  13  Please call to confirm your post-op appointment or call with any other questions

## 2020-08-03 NOTE — ANESTHESIA POSTPROCEDURE EVALUATION
Post-Op Assessment Note    CV Status:  Stable  Pain Score: 2    Pain management: adequate     Mental Status:  Alert and awake   Hydration Status:  Euvolemic   PONV Controlled:  Controlled   Airway Patency:  Patent      Post Op Vitals Reviewed: Yes      Staff: CRNA         No complications documented      BP      Temp      Pulse     Resp      SpO2

## 2020-08-06 DIAGNOSIS — F31.81 BIPOLAR 2 DISORDER (HCC): Chronic | ICD-10-CM

## 2020-08-07 ENCOUNTER — TELEPHONE (OUTPATIENT)
Dept: FAMILY MEDICINE CLINIC | Facility: HOSPITAL | Age: 60
End: 2020-08-07

## 2020-08-07 DIAGNOSIS — M20.11 HALLUX VALGUS, RIGHT: ICD-10-CM

## 2020-08-07 RX ORDER — OXYCODONE HYDROCHLORIDE AND ACETAMINOPHEN 5; 325 MG/1; MG/1
1 TABLET ORAL EVERY 6 HOURS PRN
Qty: 16 TABLET | Refills: 0 | Status: SHIPPED | OUTPATIENT
Start: 2020-08-07 | End: 2020-08-11 | Stop reason: SDUPTHER

## 2020-08-07 RX ORDER — CLONAZEPAM 0.5 MG/1
0.5 TABLET ORAL 3 TIMES DAILY
Qty: 270 TABLET | Refills: 0 | Status: SHIPPED | OUTPATIENT
Start: 2020-08-07 | End: 2020-10-21 | Stop reason: SDUPTHER

## 2020-08-10 ENCOUNTER — OFFICE VISIT (OUTPATIENT)
Dept: PODIATRY | Facility: CLINIC | Age: 60
End: 2020-08-10

## 2020-08-10 VITALS — WEIGHT: 193 LBS | BODY MASS INDEX: 30.29 KG/M2 | HEIGHT: 67 IN | TEMPERATURE: 99.1 F

## 2020-08-10 DIAGNOSIS — Z09 POSTOP CHECK: Primary | ICD-10-CM

## 2020-08-10 PROCEDURE — 3077F SYST BP >= 140 MM HG: CPT | Performed by: PODIATRIST

## 2020-08-10 PROCEDURE — 3079F DIAST BP 80-89 MM HG: CPT | Performed by: PODIATRIST

## 2020-08-10 PROCEDURE — 99024 POSTOP FOLLOW-UP VISIT: CPT | Performed by: PODIATRIST

## 2020-08-10 PROCEDURE — 3008F BODY MASS INDEX DOCD: CPT | Performed by: PODIATRIST

## 2020-08-10 NOTE — PROGRESS NOTES
This patient was seen on 8/10/20  Assessment:    Problem List Items Addressed This Visit        Other    Postop check - Primary          Plan:  Sutures left intact  Dressing change performed and instructions given to patient to keep them clean, dry and intact  Follow-up for suture removal      Diagnoses and all orders for this visit:    Postop check          Subjective:      Patient ID: Randall Vega is a 64 y o  female  Mario Alberto Don is here for her first post-op appoint  She has mild pain at the surgical site  The following portions of the patient's history were reviewed and updated as appropriate: allergies, current medications, past family history, past medical history, past social history, past surgical history and problem list     Review of Systems   Constitutional: Negative  Respiratory: Negative  Objective:      Temp 99 1 °F (37 3 °C)   Ht 5' 7" (1 702 m)   Wt 87 5 kg (193 lb)   BMI 30 23 kg/m²          Physical Exam  Vitals signs and nursing note reviewed  Constitutional:       General: She is not in acute distress  Appearance: She is normal weight  She is not ill-appearing, toxic-appearing or diaphoretic  Cardiovascular:      Rate and Rhythm: Normal rate  Pulses: Normal pulses  Pulmonary:      Effort: Pulmonary effort is normal    Neurological:      Mental Status: She is alert  The incision is well-coapted without necrosis, signs of infection, dehiscence, drainage  All sutures are intact  The calf is soft and non-tender  Vascular status is intact to all toes with brisk capillary fill and intact pedal pulses and normal sensation  Normal post-op edema and bruising is noted

## 2020-08-11 ENCOUNTER — TELEPHONE (OUTPATIENT)
Dept: FAMILY MEDICINE CLINIC | Facility: HOSPITAL | Age: 60
End: 2020-08-11

## 2020-08-11 DIAGNOSIS — G89.4 CHRONIC PAIN SYNDROME: Primary | ICD-10-CM

## 2020-08-11 DIAGNOSIS — M20.11 HALLUX VALGUS, RIGHT: ICD-10-CM

## 2020-08-11 RX ORDER — OXYCODONE HYDROCHLORIDE AND ACETAMINOPHEN 5; 325 MG/1; MG/1
1 TABLET ORAL EVERY 6 HOURS PRN
Qty: 30 TABLET | Refills: 0 | Status: SHIPPED | OUTPATIENT
Start: 2020-08-11 | End: 2020-08-20 | Stop reason: SDUPTHER

## 2020-08-11 RX ORDER — NALOXONE HYDROCHLORIDE 4 MG/.1ML
SPRAY NASAL
Qty: 1 EACH | Refills: 1 | Status: SHIPPED | OUTPATIENT
Start: 2020-08-11 | End: 2021-09-14

## 2020-08-12 ENCOUNTER — TELEPHONE (OUTPATIENT)
Dept: FAMILY MEDICINE CLINIC | Facility: HOSPITAL | Age: 60
End: 2020-08-12

## 2020-08-15 DIAGNOSIS — N28.1 RENAL CYST: ICD-10-CM

## 2020-08-15 DIAGNOSIS — N25.81 SECONDARY RENAL HYPERPARATHYROIDISM (HCC): ICD-10-CM

## 2020-08-15 DIAGNOSIS — N18.30 CHRONIC KIDNEY DISEASE, STAGE 3 (HCC): ICD-10-CM

## 2020-08-15 DIAGNOSIS — I10 ESSENTIAL HYPERTENSION: ICD-10-CM

## 2020-08-15 DIAGNOSIS — E23.2 DIABETES INSIPIDUS (HCC): ICD-10-CM

## 2020-08-18 DIAGNOSIS — M20.11 HALLUX VALGUS, RIGHT: ICD-10-CM

## 2020-08-18 RX ORDER — OXYCODONE HYDROCHLORIDE AND ACETAMINOPHEN 5; 325 MG/1; MG/1
1 TABLET ORAL EVERY 6 HOURS PRN
Qty: 30 TABLET | Refills: 0 | Status: CANCELLED | OUTPATIENT
Start: 2020-08-18

## 2020-08-20 ENCOUNTER — OFFICE VISIT (OUTPATIENT)
Dept: FAMILY MEDICINE CLINIC | Facility: HOSPITAL | Age: 60
End: 2020-08-20
Payer: MEDICARE

## 2020-08-20 VITALS
TEMPERATURE: 98.8 F | BODY MASS INDEX: 30.29 KG/M2 | DIASTOLIC BLOOD PRESSURE: 90 MMHG | OXYGEN SATURATION: 97 % | WEIGHT: 193 LBS | HEART RATE: 119 BPM | HEIGHT: 67 IN | SYSTOLIC BLOOD PRESSURE: 160 MMHG

## 2020-08-20 DIAGNOSIS — N25.81 SECONDARY RENAL HYPERPARATHYROIDISM (HCC): ICD-10-CM

## 2020-08-20 DIAGNOSIS — M20.11 HALLUX VALGUS, RIGHT: ICD-10-CM

## 2020-08-20 DIAGNOSIS — M20.11 ACQUIRED HALLUX INTERPHALANGEUS OF RIGHT FOOT: Primary | ICD-10-CM

## 2020-08-20 DIAGNOSIS — I77.0 AVF (ARTERIOVENOUS FISTULA) (HCC): ICD-10-CM

## 2020-08-20 DIAGNOSIS — F31.4 BIPOLAR 1 DISORDER, DEPRESSED, SEVERE (HCC): ICD-10-CM

## 2020-08-20 DIAGNOSIS — K21.9 GASTROESOPHAGEAL REFLUX DISEASE, ESOPHAGITIS PRESENCE NOT SPECIFIED: ICD-10-CM

## 2020-08-20 DIAGNOSIS — E23.2 DIABETES INSIPIDUS (HCC): ICD-10-CM

## 2020-08-20 DIAGNOSIS — N28.1 RENAL CYST: ICD-10-CM

## 2020-08-20 DIAGNOSIS — E78.00 HYPERCHOLESTEREMIA: Chronic | ICD-10-CM

## 2020-08-20 DIAGNOSIS — N18.4 CKD (CHRONIC KIDNEY DISEASE) STAGE 4, GFR 15-29 ML/MIN (HCC): ICD-10-CM

## 2020-08-20 DIAGNOSIS — I10 ESSENTIAL HYPERTENSION: ICD-10-CM

## 2020-08-20 DIAGNOSIS — N18.30 CHRONIC KIDNEY DISEASE, STAGE 3 (HCC): ICD-10-CM

## 2020-08-20 PROCEDURE — 99214 OFFICE O/P EST MOD 30 MIN: CPT | Performed by: INTERNAL MEDICINE

## 2020-08-20 PROCEDURE — 1036F TOBACCO NON-USER: CPT | Performed by: INTERNAL MEDICINE

## 2020-08-20 PROCEDURE — 3077F SYST BP >= 140 MM HG: CPT | Performed by: INTERNAL MEDICINE

## 2020-08-20 PROCEDURE — 3008F BODY MASS INDEX DOCD: CPT | Performed by: INTERNAL MEDICINE

## 2020-08-20 PROCEDURE — 3080F DIAST BP >= 90 MM HG: CPT | Performed by: INTERNAL MEDICINE

## 2020-08-20 RX ORDER — CARVEDILOL 3.12 MG/1
3.12 TABLET ORAL 2 TIMES DAILY WITH MEALS
Qty: 180 TABLET | Refills: 3 | Status: SHIPPED | OUTPATIENT
Start: 2020-08-20 | End: 2020-12-30 | Stop reason: SDUPTHER

## 2020-08-20 RX ORDER — OXYCODONE HYDROCHLORIDE AND ACETAMINOPHEN 5; 325 MG/1; MG/1
1 TABLET ORAL EVERY 6 HOURS PRN
Qty: 30 TABLET | Refills: 0 | Status: SHIPPED | OUTPATIENT
Start: 2020-08-20 | End: 2020-08-20 | Stop reason: SDUPTHER

## 2020-08-20 RX ORDER — OMEPRAZOLE 20 MG/1
20 CAPSULE, DELAYED RELEASE ORAL
Qty: 90 CAPSULE | Refills: 3 | Status: SHIPPED | OUTPATIENT
Start: 2020-08-20 | End: 2021-05-21 | Stop reason: SDUPTHER

## 2020-08-20 RX ORDER — OXYCODONE HYDROCHLORIDE AND ACETAMINOPHEN 5; 325 MG/1; MG/1
1 TABLET ORAL EVERY 6 HOURS PRN
Qty: 30 TABLET | Refills: 0 | Status: SHIPPED | OUTPATIENT
Start: 2020-08-20 | End: 2020-09-16 | Stop reason: SDUPTHER

## 2020-08-20 NOTE — PROGRESS NOTES
Assessment/Plan:             Problem List Items Addressed This Visit        Endocrine    Secondary renal hyperparathyroidism (HCC)    Relevant Medications    carvedilol (COREG) 3 125 mg tablet       Cardiovascular and Mediastinum    Essential hypertension    Relevant Medications    carvedilol (COREG) 3 125 mg tablet    AVF (arteriovenous fistula) ([facility])     Good thrill on left arm site            Musculoskeletal and Integument    Hallux valgus, right    Relevant Medications    oxyCODONE-acetaminophen (PERCOCET) 5-325 mg per tablet       Genitourinary    Renal cyst    Relevant Medications    carvedilol (COREG) 3 125 mg tablet    CKD (chronic kidney disease) stage 4, GFR 15-29 ml/min (HCC)       Other    Hypercholesteremia (Chronic)    Bipolar 1 disorder, depressed, severe (HCC)    Acquired hallux interphalangeus of right foot - Primary      Other Visit Diagnoses     Chronic kidney disease, stage 3 ([facility])        Relevant Medications    carvedilol (COREG) 3 125 mg tablet    Diabetes insipidus (HCC)        Relevant Medications    carvedilol (COREG) 3 125 mg tablet    Gastroesophageal reflux disease, esophagitis presence not specified        Relevant Medications    omeprazole (PriLOSEC) 20 mg delayed release capsule            Subjective:      Patient ID: Nicol Landa is a 64 y o  female    1  Foot pain- had surgery with Dr Ibarra Given 2 weeks ago- will see him in another 2 weeks to get sutures out  Using percocet as tramdol is not controlling pain- I have refilled one more 30 of percocet but willhave her convert back to tramadol      The following portions of the patient's history were reviewed and updated as appropriate: allergies, current medications and problem list      Review of Systems   Constitutional: Negative for chills and fever  Musculoskeletal: Positive for joint swelling          Right knee swelling with using crutches- has seen Dr Eli Rudolph in past   All other systems reviewed and are negative  Objective:      Current Outpatient Medications:     acetaminophen (TYLENOL) 325 mg tablet, Take 650 mg by mouth every 6 (six) hours as needed for mild pain, Disp: , Rfl:     AMILoride 5 mg tablet, Take 1 tablet by mouth twice daily, Disp: 120 tablet, Rfl: 0    aspirin (ECOTRIN LOW STRENGTH) 81 mg EC tablet, Take 1 tablet (81 mg total) by mouth daily, Disp: 30 tablet, Rfl: 0    atorvastatin (LIPITOR) 40 mg tablet, Take 1 tablet (40 mg total) by mouth daily (Patient taking differently: Take 40 mg by mouth every evening ), Disp: 30 tablet, Rfl: 11    budesonide-formoterol (SYMBICORT) 160-4 5 mcg/act inhaler, Inhale 2 puffs 2 (two) times a day Rinse mouth after use , Disp: 3 Inhaler, Rfl: 3    carvedilol (COREG) 3 125 mg tablet, Take 1 tablet (3 125 mg total) by mouth 2 (two) times a day with meals, Disp: 180 tablet, Rfl: 3    clonazePAM (KlonoPIN) 0 5 mg tablet, Take 1 tablet (0 5 mg total) by mouth 3 (three) times a day, Disp: 270 tablet, Rfl: 0    fluocinonide (LIDEX) 0 05 % ointment, Apply topically 2 (two) times a day, Disp: 60 g, Rfl: 1    fluvoxaMINE (LUVOX) 100 mg tablet, 3 at bedtime, Disp: , Rfl:     lamoTRIgine (LaMICtal) 200 MG tablet, Take 400 mg by mouth daily at bedtime , Disp: , Rfl:     linaCLOtide (Linzess) 290 MCG CAPS, Take 1 capsule by mouth daily, Disp: 90 capsule, Rfl: 3    naloxone (NARCAN) 4 mg/0 1 mL nasal spray, Administer 1 spray into a nostril   If breathing does not return to normal or if breathing difficulty resumes after 2-3 minutes, give another dose in the other nostril using a new spray , Disp: 1 each, Rfl: 1    omeprazole (PriLOSEC) 20 mg delayed release capsule, Take 1 capsule (20 mg total) by mouth daily at bedtime, Disp: 90 capsule, Rfl: 3    ondansetron (ZOFRAN-ODT) 4 mg disintegrating tablet, Dissolve 1 tablet in mouth every 8 hours prn nausea and vomiting, Disp: 60 tablet, Rfl: 1    oxyCODONE-acetaminophen (PERCOCET) 5-325 mg per tablet, Take 1 tablet by mouth every 6 (six) hours as needed for moderate painMax Daily Amount: 4 tablets, Disp: 30 tablet, Rfl: 0    Polyethylene Glycol 3350 (MIRALAX PO), Take by mouth as needed , Disp: , Rfl:     QUEtiapine (SEROquel) 100 mg tablet, Take 1 tablet by mouth daily at bedtime 1 at bedtime in addition to 400 mg tab, Disp: , Rfl:     QUEtiapine (SEROQUEL) 300 mg tablet, Take 1 tablet (300 mg total) by mouth every morning 1 in the AM     ( also takes 400 mg at bedtime) (Patient taking differently: 1 in the AM ), Disp: 90 tablet, Rfl: 3    QUEtiapine (SEROquel) 400 MG tablet, 1 at bedtime     (also takes 100 mg and 300 mg tabs), Disp: , Rfl:     traMADol (ULTRAM) 50 mg tablet, Take 1 tablet (50 mg total) by mouth 2 (two) times a day, Disp: 60 tablet, Rfl: 3    Blood pressure 160/90, pulse (!) 119, temperature 98 8 °F (37 1 °C), height 5' 7" (1 702 m), weight 87 5 kg (193 lb), SpO2 97 %, not currently breastfeeding  Physical Exam  Vitals signs and nursing note reviewed  Constitutional:       Appearance: Normal appearance  HENT:      Right Ear: Tympanic membrane normal       Left Ear: Tympanic membrane normal       Nose: No congestion  Mouth/Throat:      Pharynx: No oropharyngeal exudate or posterior oropharyngeal erythema  Cardiovascular:      Rate and Rhythm: Normal rate and regular rhythm  Heart sounds: No murmur  Pulmonary:      Effort: No respiratory distress  Breath sounds: No stridor  No rhonchi  Abdominal:      General: Abdomen is flat  Palpations: Abdomen is soft  Musculoskeletal:      Comments: Swelling in right lateral knee  With some posterior fullness   Skin:     General: Skin is dry  Comments: Right foot wound without erythema- still some swelling   Neurological:      Mental Status: She is alert  Gait: Gait abnormal       Comments: Using crutches   Psychiatric:         Mood and Affect: Mood normal          Thought Content:  Thought content normal  Judgment: Judgment normal

## 2020-08-21 RX ORDER — CARVEDILOL 3.12 MG/1
TABLET ORAL
Qty: 60 TABLET | Refills: 0 | OUTPATIENT
Start: 2020-08-21

## 2020-08-24 ENCOUNTER — APPOINTMENT (OUTPATIENT)
Dept: RADIOLOGY | Facility: CLINIC | Age: 60
End: 2020-08-24
Payer: MEDICARE

## 2020-08-24 ENCOUNTER — OFFICE VISIT (OUTPATIENT)
Dept: PODIATRY | Facility: CLINIC | Age: 60
End: 2020-08-24

## 2020-08-24 VITALS — TEMPERATURE: 98.7 F | HEIGHT: 67 IN | BODY MASS INDEX: 30.13 KG/M2 | WEIGHT: 192 LBS

## 2020-08-24 DIAGNOSIS — M20.11 HALLUX VALGUS, RIGHT: Primary | ICD-10-CM

## 2020-08-24 DIAGNOSIS — Z09 POSTOP CHECK: ICD-10-CM

## 2020-08-24 DIAGNOSIS — M20.11 HALLUX VALGUS, RIGHT: ICD-10-CM

## 2020-08-24 PROCEDURE — 3008F BODY MASS INDEX DOCD: CPT | Performed by: PODIATRIST

## 2020-08-24 PROCEDURE — 3080F DIAST BP >= 90 MM HG: CPT | Performed by: PODIATRIST

## 2020-08-24 PROCEDURE — 73630 X-RAY EXAM OF FOOT: CPT

## 2020-08-24 PROCEDURE — 99024 POSTOP FOLLOW-UP VISIT: CPT | Performed by: PODIATRIST

## 2020-08-24 PROCEDURE — 3077F SYST BP >= 140 MM HG: CPT | Performed by: PODIATRIST

## 2020-08-25 ENCOUNTER — TELEPHONE (OUTPATIENT)
Dept: PODIATRY | Facility: CLINIC | Age: 60
End: 2020-08-25

## 2020-08-25 NOTE — TELEPHONE ENCOUNTER
Patient called in concerned as there is clear seepage coming from her incision site  She wanted to know if she should be concerned

## 2020-08-28 DIAGNOSIS — M46.1 BILATERAL SACROILIITIS (HCC): ICD-10-CM

## 2020-08-28 RX ORDER — TRAMADOL HYDROCHLORIDE 50 MG/1
TABLET ORAL
Qty: 120 TABLET | Refills: 0 | Status: SHIPPED | OUTPATIENT
Start: 2020-08-28 | End: 2020-10-06

## 2020-08-28 NOTE — TELEPHONE ENCOUNTER
No have her switch to tramdaol- was taking it bid- increase to 100 mg bid- ok to send that in for new rx

## 2020-08-31 ENCOUNTER — OFFICE VISIT (OUTPATIENT)
Dept: PODIATRY | Facility: CLINIC | Age: 60
End: 2020-08-31

## 2020-08-31 VITALS — HEIGHT: 67 IN | BODY MASS INDEX: 30.29 KG/M2 | TEMPERATURE: 98.2 F | WEIGHT: 193 LBS

## 2020-08-31 DIAGNOSIS — Z09 POSTOP CHECK: Primary | ICD-10-CM

## 2020-08-31 PROCEDURE — 3080F DIAST BP >= 90 MM HG: CPT | Performed by: PODIATRIST

## 2020-08-31 PROCEDURE — 99024 POSTOP FOLLOW-UP VISIT: CPT | Performed by: PODIATRIST

## 2020-08-31 PROCEDURE — 3077F SYST BP >= 140 MM HG: CPT | Performed by: PODIATRIST

## 2020-08-31 PROCEDURE — 3008F BODY MASS INDEX DOCD: CPT | Performed by: PODIATRIST

## 2020-08-31 NOTE — PROGRESS NOTES
This patient was seen on 8/31/20  Assessment:    Problem List Items Addressed This Visit        Other    Postop check - Primary          Plan:  Toe spacer dispensed  Patient may discontinue walker  Patient to continue ambulation in surgical shoe  Diagnoses and all orders for this visit:    Postop check          Subjective:      Patient ID: Meredith Cochran is a 64 y o  female  Rebeca Reid is here for a post-op appointment  She notes some residual tenderness at the surgical site  She is using the surgical shoe and walker  The following portions of the patient's history were reviewed and updated as appropriate: allergies, current medications, past family history, past medical history, past social history, past surgical history and problem list     Review of Systems   Constitutional: Negative  Respiratory: Negative  Objective:      Temp 98 2 °F (36 8 °C)   Ht 5' 7" (1 702 m)   Wt 87 5 kg (193 lb)   BMI 30 23 kg/m²          Physical Exam  Vitals signs and nursing note reviewed  Constitutional:       General: She is not in acute distress  Appearance: Normal appearance  Pulmonary:      Effort: Pulmonary effort is normal       Breath sounds: Normal breath sounds  Neurological:      Mental Status: She is alert  The incision is well healed without pain, hypertrophy  Good correction noted  The calf is soft and non-tender  Vascular status is intact to all toes with brisk capillary fill and intact pedal pulses and normal sensation  Moderate residual edema noted

## 2020-09-08 DIAGNOSIS — I10 HTN (HYPERTENSION): ICD-10-CM

## 2020-09-08 RX ORDER — AMILORIDE HYDROCHLORIDE 5 MG/1
TABLET ORAL
Qty: 120 TABLET | Refills: 0 | Status: SHIPPED | OUTPATIENT
Start: 2020-09-08 | End: 2020-11-13

## 2020-09-11 ENCOUNTER — OFFICE VISIT (OUTPATIENT)
Dept: PODIATRY | Facility: CLINIC | Age: 60
End: 2020-09-11

## 2020-09-11 VITALS
TEMPERATURE: 99.3 F | BODY MASS INDEX: 30.29 KG/M2 | WEIGHT: 193 LBS | DIASTOLIC BLOOD PRESSURE: 78 MMHG | HEIGHT: 67 IN | SYSTOLIC BLOOD PRESSURE: 127 MMHG

## 2020-09-11 DIAGNOSIS — Z09 POSTOP CHECK: Primary | ICD-10-CM

## 2020-09-11 PROCEDURE — 99024 POSTOP FOLLOW-UP VISIT: CPT | Performed by: PODIATRIST

## 2020-09-15 DIAGNOSIS — M46.1 BILATERAL SACROILIITIS (HCC): ICD-10-CM

## 2020-09-15 RX ORDER — TRAMADOL HYDROCHLORIDE 50 MG/1
TABLET ORAL
Qty: 120 TABLET | Refills: 0 | OUTPATIENT
Start: 2020-09-15

## 2020-09-16 ENCOUNTER — APPOINTMENT (EMERGENCY)
Dept: RADIOLOGY | Facility: HOSPITAL | Age: 60
End: 2020-09-16
Payer: MEDICARE

## 2020-09-16 ENCOUNTER — HOSPITAL ENCOUNTER (EMERGENCY)
Facility: HOSPITAL | Age: 60
Discharge: HOME | End: 2020-09-16
Attending: EMERGENCY MEDICINE | Admitting: EMERGENCY MEDICINE
Payer: MEDICARE

## 2020-09-16 VITALS
BODY MASS INDEX: 30.23 KG/M2 | WEIGHT: 193 LBS | DIASTOLIC BLOOD PRESSURE: 72 MMHG | HEART RATE: 88 BPM | RESPIRATION RATE: 17 BRPM | SYSTOLIC BLOOD PRESSURE: 158 MMHG | TEMPERATURE: 97.3 F | OXYGEN SATURATION: 95 %

## 2020-09-16 DIAGNOSIS — M20.11 HALLUX VALGUS, RIGHT: ICD-10-CM

## 2020-09-16 DIAGNOSIS — S92.412A DISPLACED FRACTURE OF PROXIMAL PHALANX OF LEFT GREAT TOE, INITIAL ENCOUNTER FOR CLOSED FRACTURE: Primary | ICD-10-CM

## 2020-09-16 DIAGNOSIS — S82.451A CLOSED DISPLACED COMMINUTED FRACTURE OF SHAFT OF RIGHT FIBULA, INITIAL ENCOUNTER: ICD-10-CM

## 2020-09-16 PROCEDURE — 97167 OT EVAL HIGH COMPLEX 60 MIN: CPT

## 2020-09-16 PROCEDURE — 73610 X-RAY EXAM OF ANKLE: CPT

## 2020-09-16 PROCEDURE — 99285 EMERGENCY DEPT VISIT HI MDM: CPT | Performed by: EMERGENCY MEDICINE

## 2020-09-16 PROCEDURE — 99284 EMERGENCY DEPT VISIT MOD MDM: CPT

## 2020-09-16 PROCEDURE — 73660 X-RAY EXAM OF TOE(S): CPT

## 2020-09-16 PROCEDURE — 97163 PT EVAL HIGH COMPLEX 45 MIN: CPT

## 2020-09-16 PROCEDURE — 29515 APPLICATION SHORT LEG SPLINT: CPT | Performed by: EMERGENCY MEDICINE

## 2020-09-16 RX ORDER — HYDROCODONE BITARTRATE AND ACETAMINOPHEN 5; 325 MG/1; MG/1
1 TABLET ORAL ONCE
Status: COMPLETED | OUTPATIENT
Start: 2020-09-16 | End: 2020-09-16

## 2020-09-16 RX ORDER — ACETAMINOPHEN 325 MG/1
650 TABLET ORAL ONCE
Status: COMPLETED | OUTPATIENT
Start: 2020-09-16 | End: 2020-09-16

## 2020-09-16 RX ORDER — OXYCODONE HYDROCHLORIDE AND ACETAMINOPHEN 5; 325 MG/1; MG/1
1 TABLET ORAL EVERY 6 HOURS PRN
Qty: 30 TABLET | Refills: 0 | Status: SHIPPED | OUTPATIENT
Start: 2020-09-16 | End: 2020-09-25 | Stop reason: SDUPTHER

## 2020-09-16 RX ADMIN — HYDROCODONE BITARTRATE AND ACETAMINOPHEN 1 TABLET: 5; 325 TABLET ORAL at 10:43

## 2020-09-16 RX ADMIN — HYDROCODONE BITARTRATE AND ACETAMINOPHEN 1 TABLET: 5; 325 TABLET ORAL at 15:51

## 2020-09-16 RX ADMIN — ACETAMINOPHEN 650 MG: 325 TABLET, FILM COATED ORAL at 08:13

## 2020-09-16 NOTE — PLAN OF CARE
Problem: OCCUPATIONAL THERAPY ADULT  Goal: Performs self-care activities at highest level of function for planned discharge setting  See evaluation for individualized goals  Description: Treatment Interventions: ADL retraining, Functional transfer training, Patient/family training, Compensatory technique education, Equipment evaluation/education, UE strengthening/ROM  Equipment Recommended: (drop arm commode, sliding board, w/c)       See flowsheet documentation for full assessment, interventions and recommendations  Note: Limitation: Decreased ADL status, Decreased self-care trans, Decreased high-level ADLs  Prognosis: Good  Assessment: Pt is a 64 y o  female seen for OT evaluation at [address] [place] [address], admitted 9/16/2020 s/p fall down steps  X-rays revealed right acute distal fibular fracture at the level of the ankle joint  Left acute comminuted fracture of the 1st proximal phalanx with intra-articular extension  Status post 1st metatarsal bunionectomy  OT completed extensive review of pt's medical and social history  Comorbidities affecting pt's functional performance at time of assessment include: Age related osteoporosis, Cardiac murmur, Bipolar 1 disorder, Patellofemoral syndrome of left knee, Obsessive compulsive disorder, recent buninionectomy (with current NWB orders), etc (see chart for additional hx)  Personal factors affecting pt at time of IE include:steps to enter environment, difficulty performing ADLS, difficulty performing IADLS  and decreased functional mobility  Pt with active OT orders and BLE NWB orders  Prior to admission, pt was living on 3rd floor of home  Pt was I w/  ADLS and IADLS, (+) drove, & required no use of DME PTA (until recent bunion sx where she was using crutches)    Upon evaluation: Pt requires supervision for bed mobility, CGA (min A x 1) for functional mobility/transfers, supervision for UB ADLs and min-mod A for LB ADLS 2* the following deficits impacting occupational performance: weakness, decreased balance and orthopedic restrictions  Pt to benefit from continued skilled OT tx while in the hospital to address deficits as defined above and maximize level of functional independence w ADL's and functional mobility  Occupational Performance areas to address include: bathing/shower, toilet hygiene, dressing, functional mobility, community mobility and clothing management  Based on findings, pt is of high complexity  At this time, OT recommendations at time of discharge are home OT/home with family support       OT Discharge Recommendation: Home with skilled therapy(home OT)

## 2020-09-16 NOTE — CASE MANAGEMENT
LOS: 0  Received consult patient fell and will need WC, BSC, sliding board and VNA  Met with patient was informed patient resides with her sister in a 2 story home with 2 MILO  Prior to her fall she was independent of ADL's  She is now bilateral NWB and will need a WC, BSC and sliding board  Patient will also need VNA for PT/OT/SN  Patient is agreeable to the above  List provided and Freedom of Choice given, patient has no preference for DME or VNA  Referral to [facility] DME made and to VNASL's via [facility]  Patient will need to  DME from [facility] in [facility]  Her ister will transport her home

## 2020-09-16 NOTE — ED PROVIDER NOTES
History  Chief Complaint   Patient presents with    Ankle Injury     To ED with c/o right ankle pain and swelling after slipping down 2 steps this morning at 0400  This 21-year-old female is approximately 5 weeks status post bunionectomy on the right says she was walking down the stairs carrying her dog at four o'clock this morning when she slipped  She states that she landed two steps below and both feet and ankles hyper-extended  She did not injure anything else when she fell  She was barefoot at the time although she had dressings on the right forefoot  She currently complains of pain in the left great toe and in the lateral aspect of the right ankle  She denies any other pain or injury  Dressing remained intact  There is no change in sensation of the forefoot  Prior to Admission Medications   Prescriptions Last Dose Informant Patient Reported? Taking?    AMILoride 5 mg tablet  Self No No   Sig: Take 1 tablet by mouth twice daily   Polyethylene Glycol 3350 (MIRALAX PO)  Self Yes No   Sig: Take by mouth as needed    QUEtiapine (SEROQUEL) 300 mg tablet  Self No No   Sig: Take 1 tablet (300 mg total) by mouth every morning 1 in the AM     ( also takes 400 mg at bedtime)   Patient not taking: Reported on 2020   QUEtiapine (SEROquel) 100 mg tablet  Self Yes No   Sig: Take 1 tablet by mouth daily at bedtime 1 at bedtime in addition to 400 mg tab   QUEtiapine (SEROquel) 400 MG tablet  Self Yes No   Si at bedtime     (also takes 100 mg and 300 mg tabs)   acetaminophen (TYLENOL) 325 mg tablet  Self Yes No   Sig: Take 650 mg by mouth every 6 (six) hours as needed for mild pain   aspirin (ECOTRIN LOW STRENGTH) 81 mg EC tablet  Self No No   Sig: Take 1 tablet (81 mg total) by mouth daily   atorvastatin (LIPITOR) 40 mg tablet  Self No No   Sig: Take 1 tablet (40 mg total) by mouth daily   budesonide-formoterol (SYMBICORT) 160-4 5 mcg/act inhaler  Self No No   Sig: Inhale 2 puffs 2 (two) times a day Rinse mouth after use  carvedilol (COREG) 3 125 mg tablet  Self No No   Sig: Take 1 tablet (3 125 mg total) by mouth 2 (two) times a day with meals   clonazePAM (KlonoPIN) 0 5 mg tablet  Self No No   Sig: Take 1 tablet (0 5 mg total) by mouth 3 (three) times a day   fluocinonide (LIDEX) 0 05 % ointment  Self No No   Sig: Apply topically 2 (two) times a day   fluvoxaMINE (LUVOX) 100 mg tablet  Self Yes No   Sig: 3 at bedtime   lamoTRIgine (LaMICtal) 200 MG tablet  Self Yes No   Sig: Take 400 mg by mouth daily at bedtime    linaCLOtide (Linzess) 290 MCG CAPS   No No   Sig: Take 1 capsule by mouth daily   naloxone (NARCAN) 4 mg/0 1 mL nasal spray  Self No No   Sig: Administer 1 spray into a nostril  If breathing does not return to normal or if breathing difficulty resumes after 2-3 minutes, give another dose in the other nostril using a new spray     omeprazole (PriLOSEC) 20 mg delayed release capsule  Self No No   Sig: Take 1 capsule (20 mg total) by mouth daily at bedtime   ondansetron (ZOFRAN-ODT) 4 mg disintegrating tablet  Self No No   Sig: Dissolve 1 tablet in mouth every 8 hours prn nausea and vomiting   oxyCODONE-acetaminophen (PERCOCET) 5-325 mg per tablet  Self No No   Sig: Take 1 tablet by mouth every 6 (six) hours as needed for moderate painMax Daily Amount: 4 tablets   oxyCODONE-acetaminophen (PERCOCET) 5-325 mg per tablet   No No   Sig: Take 1 tablet by mouth every 6 (six) hours as needed for moderate pain for up to 10 daysMax Daily Amount: 4 tablets   traMADol (ULTRAM) 50 mg tablet  Self No No   Sig: Take 2 tabs by mouth twice daily      Facility-Administered Medications: None       Past Medical History:   Diagnosis Date    Ankle sprain     left    Anxiety disorder     Bipolar 2 disorder (HCC)     Chronic back pain     CKD (chronic kidney disease) stage 3, GFR 30-59 ml/min ([facility])     stage 4 (as per pt)    CVA (cerebral vascular accident) ([address] )     noted on MRI in the past    Depression     GERD (gastroesophageal reflux disease)     Hypercholesteremia     Hyperlipidemia     Hypernatremia     Hypertension     Hypokalemia     Intervertebral disc disorder with radiculopathy of lumbosacral region     resolved: 2015    Kidney disease     Panic attacks     Pericardial effusion     PONV (postoperative nausea and vomiting)     Radiculitis     resolved: 2015    Secondary renal hyperparathyroidism ([facility] [address] )     Vitamin D deficiency        Past Surgical History:   Procedure Laterality Date    BUNIONECTOMY      Left foot     COLON SURGERY      COLONOSCOPY  2018    DILATION AND CURETTAGE OF UTERUS      INDUCED       surgically induced    HI ANASTOMOSIS,AV,ANY SITE Left 2019    Procedure: CREATION FISTULA ARTERIOVENOUS (AV) left wrists possible left upper;  Surgeon: Mary Kate Jung MD;  [facility];  Service: Vascular    HI [place] W/SESMDC W/DIST Rozelle Charnley Left 2019    Procedure: Luisa Meneses;  Surgeon: Alexa Cortes DPM;  [facility];  Service: Podiatry    HI [place] W/SESMDC W/DIST Rozelle Charnley Right 8/3/2020    Procedure: Key Marin;  Surgeon: Alexa Cortes DPM;  [facility];  Service: Podiatry    HI ERCP DX COLLECTION SPECIMEN BRUSHING/WASHING N/A 2018    Procedure: ENDOSCOPIC RETROGRADE CHOLANGIOPANCREATOGRAPHY (ERCP);   Surgeon: Agatha Schwarz MD;  [facility];  Service: Gastroenterology    HI LAP, SURG PROCTOPEXY N/A 2018    Procedure: ROBOTIC SIGMOID RESECTION / RECTOPEXY;  Surgeon: Milton Johnson MD;  [facility];  Service: Colorectal    HI SIGMOIDOSCOPY FLX DX W/COLLJ SPEC BR/WA IF PFRMD N/A 2018    Procedure: Jason Ventura;  Surgeon: Milton Johnson MD;  [facility];  Service: Colorectal    TUBAL LIGATION Bilateral [facility] THYROID BIOPSY  2019       Family History   Problem Relation Age of Onset    Bipolar disorder Mother     Mental illness Mother         depression    Stroke Mother     Dementia Mother     Colon polyps Mother     Heart disease Father     Hypertension Father     Diabetes Father     Other Family         Back disorder    Diabetes Family     Heart disease Family     Hypertension Family     Breast cancer Family     Stroke Family     Thyroid disease Family     Breast cancer Paternal Grandmother     Breast cancer Paternal [de-identified]     Breast cancer Maternal Aunt     Mental illness Sister     Colon polyps Sister     Mental illness Sister     Heart disease Sister     No Known Problems Sister     No Known Problems Sister     Other Son         pituitary tumor    Hypertension Son     Obesity Son     No Known Problems Son     Substance Abuse Neg Hx         neg fam hx    Colon cancer Neg Hx      I have reviewed and agree with the history as documented  E-Cigarette/Vaping    E-Cigarette Use Never User      E-Cigarette/Vaping Substances    Nicotine No     THC No     CBD No     Flavoring No     Other No     Unknown No      Social History     Tobacco Use    Smoking status: Never Smoker    Smokeless tobacco: Never Used   Substance Use Topics    Alcohol use: Never     Frequency: Never     Binge frequency: Never    Drug use: Not Currently     Types: Marijuana       Review of Systems   Musculoskeletal: Positive for arthralgias, back pain and gait problem  Psychiatric/Behavioral: The patient is nervous/anxious  All other systems reviewed and are negative  Physical Exam  Physical Exam  Vitals signs and nursing note reviewed  Constitutional:       General: She is not in acute distress  Appearance: Normal appearance  She is obese  She is not ill-appearing, toxic-appearing or diaphoretic  HENT:      Head: Normocephalic and atraumatic        Right Ear: External ear normal       Left Ear: External ear normal    Eyes:      Conjunctiva/sclera: Conjunctivae normal       Pupils: Pupils are equal, round, and reactive to light  Neck:      Musculoskeletal: Normal range of motion and neck supple  No muscular tenderness  Cardiovascular:      Rate and Rhythm: Normal rate and regular rhythm  Pulses: Normal pulses  Pulmonary:      Effort: Pulmonary effort is normal  No respiratory distress  Breath sounds: Normal breath sounds  Abdominal:      Palpations: Abdomen is soft  Tenderness: There is no abdominal tenderness  There is no guarding  Musculoskeletal:         General: No deformity  Comments: Mild tenderness in swelling overlying right lateral malleolus  No proximal fibular crepitance or deformity  Dressing is intact on the right forefoot  Toes appear normal   No tenderness to heel or foot outside of the recent surgical wound  Minimal general tenderness of left great toe without deformity  Range of motion is normal tendon function is intact and capillary refill is brisk  Skin:     General: Skin is warm and dry  Capillary Refill: Capillary refill takes less than 2 seconds  Findings: No rash  Neurological:      General: No focal deficit present  Mental Status: She is alert  Mental status is at baseline  Gait: Gait abnormal (Mildly slow and narrow based gait )     Psychiatric:         Mood and Affect: Mood normal          Behavior: Behavior normal          Vital Signs  ED Triage Vitals [09/16/20 0805]   Temperature Pulse Respirations Blood Pressure SpO2   (!) 97 3 °F (36 3 °C) 77 20 165/70 98 %      Temp Source Heart Rate Source Patient Position - Orthostatic VS BP Location FiO2 (%)   Tympanic Monitor Sitting Right arm --      Pain Score       7           Vitals:    09/16/20 0805 09/16/20 0905 09/16/20 1100 09/16/20 1130   BP: 165/70 154/72 154/72 158/72   Pulse: 77 84 89 88   Patient Position - Orthostatic VS: Sitting Lying Lying          Visual Acuity  Visual Acuity      Most Recent Value   L Pupil Size (mm)  3   R Pupil Size (mm)  3 ED Medications  Medications   acetaminophen (TYLENOL) tablet 650 mg (650 mg Oral Given 9/16/20 0813)   HYDROcodone-acetaminophen (NORCO) 5-325 mg per tablet 1 tablet (1 tablet Oral Given 9/16/20 1043)   HYDROcodone-acetaminophen (NORCO) 5-325 mg per tablet 1 tablet (1 tablet Oral Given 9/16/20 1551)       Diagnostic Studies  Results Reviewed     None                 XR ankle 3+ views RIGHT   ED Interpretation by Pedro West DO (09/16 7910)   Mildly displaced distal fibular fracture  Mortise remains intact      Final Result by Melissa Patrick MD (09/16 4227)      Acute distal fibular fracture at the level of the ankle joint  No dislocation injury  Small ankle joint effusion  I concur with the preliminary interpretation provided by the emergency room physician  Workstation performed: [id number]         XR toe great min 2 views LEFT   ED Interpretation by Pedro West DO (09/16 9586)   A comminuted fracture proximal phalanx      Final Result by Melissa Patrick MD (09/16 0072)      Acute comminuted fracture of the 1st proximal phalanx with intra-articular extension  Status post 1st metatarsal bunionectomy  I concur with the preliminary interpretation provided by the emergency room physician  Workstation performed: [id number]                    Procedures  Orthopedic injury treatment    Date/Time: 9/16/2020 11:03 AM  Performed by: Pedro West DO  Authorized by: Pedro West DO     Patient Location:  ED  Other Assisting Provider: Yes (comment) (nurse)    Verbal consent obtained?: Yes    Written consent obtained?: No    Risks and benefits: Risks, benefits and alternatives were discussed    Consent given by:  Patient  Patient states understanding of procedure being performed: Yes    Patient's understanding of procedure matches consent: Yes    Radiology Images displayed and confirmed   If images not available, report reviewed: Yes    Injury location:  Lower leg  Location details:  Right lower leg  Injury type:  Fracture  Fracture type: lateral malleolus    Fracture type: lateral malleolus    Neurovascular status: Neurovascularly intact    Distal perfusion: normal    Neurological function: normal    Range of motion: normal    Local anesthesia used?: No    General anesthesia used?: No    Immobilization:  Splint  Splint type:  Short leg  Supplies used:  Cotton padding, elastic bandage and Ortho-Glass  Neurovascular status: Neurovascularly intact    Distal perfusion: normal    Neurological function: normal    Range of motion: unchanged    Patient tolerance:  Patient tolerated the procedure well with no immediate complications  Orthopedic injury treatment    Date/Time: 9/16/2020 11:23 AM  Performed by: Fernando Gaucher, DO  Authorized by: Fernando Gaucher, DO     Patient Location:  ED  Other Assisting Provider: Yes (comment) (nurse)    Verbal consent obtained?: Yes    Risks and benefits: Risks, benefits and alternatives were discussed    Consent given by:  Patient  Patient states understanding of procedure being performed: Yes    Patient's understanding of procedure matches consent: Yes    Radiology Images displayed and confirmed   If images not available, report reviewed: Yes    Injury location:  Toe  Location details:  Left great toe  Injury type:  Fracture  Fracture type: proximal phalanx    Neurovascular status: Neurovascularly intact    Distal perfusion: normal    Neurological function: normal    Range of motion: reduced    Local anesthesia used?: No    General anesthesia used?: No    Immobilization:  Splint  Splint type:  Short leg  Supplies used:  Cotton padding, elastic bandage and Ortho-Glass  Neurovascular status: Neurovascularly intact    Distal perfusion: normal    Neurological function: normal    Range of motion: unchanged    Patient tolerance:  Patient tolerated the procedure well with no immediate complications             ED Course  ED Course as of Sep 19 1310   Wed Sep 16, 2020   Nila 45 AP has been texted      1017 Case discussed with orthopedics  I then texted information to Podiatry, Dr Cristo Ellington  Await his input before my disposition  80 Dr Cristo Ellington, podiatrist, agrees with plan  He will see patient within the week  1137 As per Ortho and Podiatry bilateral posterior short-leg splints were placed  Spoke to case management and will give prescription the patient for wheelchair  Patient's sister will come to pick her up  PT and OT will evaluate her and instruct the family on home mobilization      1407 Seen by PT and OT  Rx written for WC, commode and sliding board  Unable to order "face to face ambulatory referral for home care"  Spent a long time on phone with   IT searching for correct order  SBIRT 20yo+      Most Recent Value   SBIRT (24 yo +)   In order to provide better care to our patients, we are screening all of our patients for alcohol and drug use  Would it be okay to ask you these screening questions? Yes Filed at: 09/16/2020 0810   Initial Alcohol Screen: US AUDIT-C    1  How often do you have a drink containing alcohol?  0 Filed at: 09/16/2020 0810   2  How many drinks containing alcohol do you have on a typical day you are drinking? 0 Filed at: 09/16/2020 0810   3a  Male UNDER 65: How often do you have five or more drinks on one occasion? 0 Filed at: 09/16/2020 0810   3b  FEMALE Any Age, or MALE 65+: How often do you have 4 or more drinks on one occassion? 0 Filed at: 09/16/2020 0810   Audit-C Score  0 Filed at: 09/16/2020 9329   ZAYRA: How many times in the past year have you    Used an illegal drug or used a prescription medication for non-medical reasons?   Never Filed at: 09/16/2020 0810                  MDM  Number of Diagnoses or Management Options  Closed displaced comminuted fracture of shaft of right fibula, initial encounter: new and requires workup  Displaced fracture of proximal phalanx of left great toe, initial encounter for closed fracture: new and requires workup     Amount and/or Complexity of Data Reviewed  Tests in the radiology section of CPT®: ordered and reviewed  Review and summarize past medical records: yes  Discuss the patient with other providers: yes  Independent visualization of images, tracings, or specimens: yes        Disposition  Final diagnoses:   Displaced fracture of proximal phalanx of left great toe, initial encounter for closed fracture   Closed displaced comminuted fracture of shaft of right fibula, initial encounter     Time reflects when diagnosis was documented in both MDM as applicable and the Disposition within this note     Time User Action Codes Description Comment    9/16/2020 12:43 PM Alveta Eneida Add [N21 223X] Displaced fracture of proximal phalanx of left great toe, initial encounter for closed fracture     9/16/2020 12:43 PM Alveta Eneida Add [F03 510P] Closed displaced comminuted fracture of shaft of right fibula, initial encounter     9/16/2020  2:21 PM Alveta Eneida Add [M20 11] Hallux valgus, right     9/16/2020  2:21 PM Alveta Eneida Modify [M20 11] Hallux valgus, right       ED Disposition     ED Disposition Condition Date/Time Comment    Discharge Stable Wed Sep 16, 2020  2:18 PM Jazzy discharge to home/self care  Follow-up Information     Follow up With Specialties Details Why [facility]  Call today To schedule them coming out to your house   [address] [facility] [address]  Sarah 10, DPM Podiatry, [address] [place] [address]  Schedule an appointment as soon as possible for a visit  For fracture of left toe and right ankle [address]  [address]  [place] [address]  [phone]            Discharge Medication List as of 9/16/2020  2:33 PM      CONTINUE these medications which have CHANGED    Details   oxyCODONE-acetaminophen (PERCOCET) 5-325 mg per tablet Take 1 tablet by mouth every 6 (six) hours as needed for moderate pain for up to 10 daysMax Daily Amount: 4 tablets, Starting Wed 9/16/2020, Until Sat 9/26/2020, Normal         CONTINUE these medications which have NOT CHANGED    Details   acetaminophen (TYLENOL) 325 mg tablet Take 650 mg by mouth every 6 (six) hours as needed for mild pain, Historical Med      AMILoride 5 mg tablet Take 1 tablet by mouth twice daily, Normal      aspirin (ECOTRIN LOW STRENGTH) 81 mg EC tablet Take 1 tablet (81 mg total) by mouth daily, Starting Wed 8/14/2019, No Print      atorvastatin (LIPITOR) 40 mg tablet Take 1 tablet (40 mg total) by mouth daily, Starting Tue 7/7/2020, Normal      budesonide-formoterol (SYMBICORT) 160-4 5 mcg/act inhaler Inhale 2 puffs 2 (two) times a day Rinse mouth after use , Starting Tue 8/27/2019, Print      carvedilol (COREG) 3 125 mg tablet Take 1 tablet (3 125 mg total) by mouth 2 (two) times a day with meals, Starting Thu 8/20/2020, Normal      clonazePAM (KlonoPIN) 0 5 mg tablet Take 1 tablet (0 5 mg total) by mouth 3 (three) times a day, Starting Fri 8/7/2020, Normal      fluocinonide (LIDEX) 0 05 % ointment Apply topically 2 (two) times a day, Starting Wed 3/18/2020, Print      fluvoxaMINE (LUVOX) 100 mg tablet 3 at bedtime, Starting Wed 10/10/2018, Historical Med      lamoTRIgine (LaMICtal) 200 MG tablet Take 400 mg by mouth daily at bedtime , Starting Mon 4/13/2020, Historical Med      linaCLOtide (Linzess) 290 MCG CAPS Take 1 capsule by mouth daily, Starting Tue 5/19/2020, Until Thu 8/20/2020, Print      naloxone (NARCAN) 4 mg/0 1 mL nasal spray Administer 1 spray into a nostril   If breathing does not return to normal or if breathing difficulty resumes after 2-3 minutes, give another dose in the other nostril using a new spray , Normal      omeprazole (PriLOSEC) 20 mg delayed release capsule Take 1 capsule (20 mg total) by mouth daily at bedtime, Starting Thu 8/20/2020, Normal      ondansetron (ZOFRAN-ODT) 4 mg disintegrating tablet Dissolve 1 tablet in mouth every 8 hours prn nausea and vomiting, Normal      Polyethylene Glycol 3350 (MIRALAX PO) Take by mouth as needed , Historical Med      !! QUEtiapine (SEROquel) 100 mg tablet Take 1 tablet by mouth daily at bedtime 1 at bedtime in addition to 400 mg tab, Historical Med      !! QUEtiapine (SEROQUEL) 300 mg tablet Take 1 tablet (300 mg total) by mouth every morning 1 in the AM     ( also takes 400 mg at bedtime), Starting Wed 1/2/2019, Normal      !! QUEtiapine (SEROquel) 400 MG tablet 1 at bedtime     (also takes 100 mg and 300 mg tabs), Historical Med      traMADol (ULTRAM) 50 mg tablet Take 2 tabs by mouth twice daily, Normal       !! - Potential duplicate medications found  Please discuss with provider          Outpatient Discharge Orders   Commode chair     Wheelchair       PDMP Review       Value Time User    PDMP Reviewed  Yes 9/16/2020  2:20 PM Sandra Camara DO          ED Provider  Electronically Signed by           Sandra Camara DO  09/19/20 4507

## 2020-09-16 NOTE — OCCUPATIONAL THERAPY NOTE
Occupational Therapy Evaluation     Patient Name: Esteban Zepeda  OIYCP'R Date: 2020  Problem List  Active Problems:    * No active hospital problems  *    Past Medical History  Past Medical History:   Diagnosis Date    Ankle sprain     left    Anxiety disorder     Bipolar 2 disorder (HCC)     Chronic back pain     CKD (chronic kidney disease) stage 3, GFR 30-59 ml/min (HCC)     stage 4 (as per pt)    CVA (cerebral vascular accident) ([address] )     noted on MRI in the past    Depression     GERD (gastroesophageal reflux disease)     Hypercholesteremia     Hyperlipidemia     Hypernatremia     Hypertension     Hypokalemia     Intervertebral disc disorder with radiculopathy of lumbosacral region     resolved: 2015    Kidney disease     Panic attacks     Pericardial effusion     PONV (postoperative nausea and vomiting)     Radiculitis     resolved: 2015    Secondary renal hyperparathyroidism [facility] )     Vitamin D deficiency      Past Surgical History  Past Surgical History:   Procedure Laterality Date    BUNIONECTOMY      Left foot     COLON SURGERY      COLONOSCOPY  2018    DILATION AND CURETTAGE OF UTERUS      INDUCED       surgically induced    MA ANASTOMOSIS,AV,ANY SITE Left 2019    Procedure: CREATION FISTULA ARTERIOVENOUS (AV) left wrists possible left upper;  Surgeon: Pili Croft MD;  Location:  MAIN OR;  Service: Vascular    MA CORRJ HALLUX VALGUS W/SESMDC W/DIST METAR OSTEOT Left 2019    Procedure: Feliberto Zamorano;  Surgeon: Kenna Willard DPM;  Location:  MAIN OR;  Service: Podiatry    MA [place] W/SESMDC W/DIST Katharina Sites Right 8/3/2020    Procedure: Gladys Kwan;  Surgeon: Kenna Willard DPM;  Location:  MAIN OR;  Service: Podiatry    MA ERCP DX COLLECTION SPECIMEN BRUSHING/WASHING N/A 2018    Procedure: ENDOSCOPIC RETROGRADE CHOLANGIOPANCREATOGRAPHY (ERCP);   Surgeon: Eryn Ibarra MD;  Location[facility] MAIN OR;  Service: Gastroenterology    NC LAP, SURG PROCTOPEXY N/A 7/13/2018    Procedure: ROBOTIC SIGMOID RESECTION / RECTOPEXY;  Surgeon: Cassie Lopez MD;  [facility]: [facility] MAIN OR;  Service: Colorectal    NC SIGMOIDOSCOPY FLX DX W/COLLJ SPEC BR/WA IF PFRMD N/A 7/13/2018    Procedure: Singh Comings;  Surgeon: Cassie Lopez MD;  [facility]: [facility] MAIN OR;  Service: Colorectal    TUBAL LIGATION Bilateral [facility] THYROID BIOPSY  7/30/2019 09/16/20 1248   OT Last Visit   OT Visit Date 09/16/20   Note Type   Note type Eval only   Restrictions/Precautions   Weight Bearing Precautions Per Order Yes   RLE Weight Bearing Per Order NWB   LLE Weight Bearing Per Order NWB   Braces or Orthoses   (BLE dorsal foot splints)      Pain Assessment   Pain Assessment Tool Hurtado-Baker FACES   Hurtado-Baker FACES Pain Rating 4   Pain Location/Orientation Orientation: Bilateral  (ankles/foot)   Home Living   Type of Home House   Home Layout Multi-level   Bathroom Shower/Tub Tub/shower unit   Bathroom Toilet Standard   Bathroom Accessibility Not accessible  (via w/c)   Prior Function   Level of [place] Independent with ADLs and functional mobility   Lives With Alone   Receives Help From Family   ADL Assistance Independent   IADLs Independent   Falls in the last 6 months 1 to 4   Comments Pt presents to [facility] s/p fall  Pt underwent recent R bunionectomy where she was provided with axillary crutches  Prior to bunionectomy, pt was fully indepedent  Since then, has been having difficulty with mobility and self-care  Pt endorses that she can stay on 1st floor with sister who can assist as needed     Subjective   Subjective Pt states "I don't know how I'm gonna manage now"   ADL   Where Assessed   (ADLs assessed from seated position)   Eating Assistance 7  Independent   Eating Deficit Setup   Grooming Assistance 7  Independent   Grooming Deficit Setup   UB Bathing Assistance 5  Supervision/Setup   LB Bathing Assistance 5  Supervision/Setup   UB Dressing Assistance 5  Supervision/Setup   LB Dressing Assistance 3  Moderate Assistance   Toileting Assistance  5  Supervision/Setup   Bed Mobility   Supine to Sit 5  Supervision  (Pt performed long sitting then pivoted to EOB)   Additional Comments Pt remained seated in wheelchair by end of session   Transfers   Sit to Stand Unable to assess   Sliding Board transfer 4  Minimal assistance   Additional items   (CGA)   Functional Mobility   Functional Mobility 5  Supervision   Additional Comments with wheelchair   Balance   Static Sitting Good   Dynamic Sitting Fair +   Activity Tolerance   Activity Tolerance Patient tolerated treatment well   Medical Staff Made Aware PT Gretel   RUE Assessment   RUE Assessment WFL   LUE Assessment   LUE Assessment WFL   Vision-Basic Assessment   Current Vision Wears glasses all the time   Cognition   Overall Cognitive Status WFL   Arousal/Participation Alert   Attention Within functional limits   Orientation Level Oriented X4   Memory Within functional limits   Following Commands Follows one step commands without difficulty   Assessment   Limitation Decreased ADL status; Decreased self-care trans;Decreased high-level ADLs   Prognosis Good   Assessment Pt is a 64 y o  female seen for OT evaluation at [facility], admitted 9/16/2020 s/p fall down steps  X-rays revealed right acute distal fibular fracture at the level of the ankle joint  Left acute comminuted fracture of the 1st proximal phalanx with intra-articular extension  Status post 1st metatarsal bunionectomy  OT completed extensive review of pt's medical and social history  Comorbidities affecting pt's functional performance at time of assessment include: Age related osteoporosis, Cardiac murmur, Bipolar 1 disorder, Patellofemoral syndrome of left knee, Obsessive compulsive disorder, recent buninionectomy (with current NWB orders), etc (see chart for additional hx)   Personal factors affecting pt at time of IE include:steps to enter environment, difficulty performing ADLS, difficulty performing IADLS  and decreased functional mobility  Pt with active OT orders and BLE NWB orders  Prior to admission, pt was living on 3rd floor of home  Pt was I w/  ADLS and IADLS, (+) drove, & required no use of DME PTA (until recent bunion sx where she was using crutches)  Upon evaluation: Pt requires supervision for bed mobility, CGA (min A x 1) for functional mobility/transfers, supervision for UB ADLs and min-mod A for LB ADLS 2* the following deficits impacting occupational performance: weakness, decreased balance and orthopedic restrictions  Pt to benefit from continued skilled OT tx while in the hospital to address deficits as defined above and maximize level of functional independence w ADL's and functional mobility  Occupational Performance areas to address include: bathing/shower, toilet hygiene, dressing, functional mobility, community mobility and clothing management  Based on findings, pt is of high complexity  At this time, OT recommendations at time of discharge are home OT/home with family support  Goals   Patient Goals Pt wishes to regain functional mobility and independence   Plan   Treatment Interventions ADL retraining;Functional transfer training;Patient/family training; Compensatory technique education;Equipment evaluation/education;UE strengthening/ROM   Goal Expiration Date 09/26/20   OT Treatment Day 0   OT Frequency 2-3x/wk   Recommendation   OT Discharge Recommendation Home with skilled therapy  (home OT)   Equipment Recommended   (drop arm commode, sliding board, w/c)       Pt will achieve the following goals within 10 days      *Pt will complete UB bathing and dressing with mod I     *Pt will complete LB bathing and dressing with mod I      * Pt will complete toileting w/ mod i w/ G hygiene/thoroughness using DME PRN    *Pt will complete bed mobility with mod i, with bed flat and no side rail to prep for purposeful tasks    *Pt will perform functional transfers with on/off all surfaces with mod I using sliding board as needed w/ G balance/safety  *Pt will participate in UE therapeutic exercise in order to maximize strength for ADL transfers  *Pt will identify 3-5 fall risks during ADL routine to ensure home safety upon discharge       *Pt will demonstrate G carryover of pt/caregiver education and training as appropriate w/ mod I w/o cues w/ good tolerance      *Assess DME needs         ALLISON Burt/L

## 2020-09-16 NOTE — PHYSICAL THERAPY NOTE
PT eval     09/16/20 1244   PT Last Visit   PT Visit Date 09/16/20   Note Type   Note type Eval only   Pain Assessment   Pain Assessment Tool 0-10   Pain Score 4   Pain Location/Orientation Orientation: Bilateral  (ankle foot)   Pain Onset/Description Onset: Ongoing   Patient's Stated Pain Goal 5   Home Living   Type of Home House   Home Layout Multi-level; Able to live on main level with bedroom/bathroom   Home Equipment Crutches   Additional Comments reports not quite successful with crutches form bunionectony 5 weeks ago L foot   Prior Function   Level of [place] Independent with ADLs and functional mobility   Lives With Family   ADL Assistance Independent   IADLs Independent   Falls in the last 6 months 1 to 4   Vocational On disability   Comments feel down 2 steps adn injured B ankle/foot; also s/p LL grest toe bunionectomy 5 weeks ago   Restrictions/Precautions   Weight Bearing Precautions Per Order Yes   RLE Weight Bearing Per Order NWB   LLE Weight Bearing Per Order NWB   General   Additional Pertinent History R bunionectomy s/p 5 weeks R great toe fx adn L lateral ankle fx from current fall   B posterior splints   Family/Caregiver Present No   Cognition   Overall Cognitive Status WFL   Arousal/Participation Alert   Orientation Level Oriented X4   Following Commands Follows all commands and directions without difficulty   Comments occas impulsive   RUE Assessment   RUE Assessment WFL   LUE Assessment   LUE Assessment WFL   RLE Assessment   RLE Assessment WFL  (hips and knee wfl)   LLE Assessment   LLE Assessment   (hips and knees wfl ankle splinted bilat)   Coordination   Movements are Fluid and Coordinated 1   Proprioception   RLE Proprioception Grossly intact   LLE Proprioception Grossly Intact   Bed Mobility   Supine to Sit 5  Supervision   Additional Comments oob in wc aftersession   Transfers   Sit to Stand Unable to assess   Sliding Board transfer 4  Minimal assistance   Additional items Assist x 1;Increased time required;Verbal cues  (assist to place board initially)   Balance   Static Sitting Normal   Dynamic Sitting Good   Endurance Deficit   Endurance Deficit No   Activity Tolerance   Activity Tolerance Patient tolerated treatment well   Medical Staff Hannah Lindsey Parkinson   Assessment   Prognosis Good   Problem List Orthopedic restrictions   Assessment Pt presetns to ED after fall with injury to Bilat feet and ankles R great toe fx and L lateral ankle fx, posterior splinta applied to both  Referred to PT OT NWB B les  Pt report she can have 1st floor set-up adn her sister will assist at home  Instr in slide board transfers stretcher  to commode, commode to   Reviewed NWB status adn equipment neede for safe home d/c  Spoke with MD adn craemanager for referred to HOME PT and for needed equipment Rx  Pt anxious for d/c and able to maintian NWB during transfers   d/c PT   Barriers to Discharge None   Goals   Patient Goals go home   Plan   PT Frequency   (d/c PT)   Recommendation   PT Discharge Recommendation Home with skilled therapy   Equipment Recommended Wheelchair  (slide board, drop arm commode)   Asha Holder, PT

## 2020-09-18 ENCOUNTER — TELEPHONE (OUTPATIENT)
Dept: OBGYN CLINIC | Facility: HOSPITAL | Age: 60
End: 2020-09-18

## 2020-09-18 NOTE — TELEPHONE ENCOUNTER
Patient called to find out if she should see ortho or podiatry for a left big toe fx and right ankle fx  ED referred to Podiatry and I advised that Podiatry may be able to see her for both  And that ortho would just treat the ankle  Patient will call Podiatry

## 2020-09-23 ENCOUNTER — OFFICE VISIT (OUTPATIENT)
Dept: PODIATRY | Facility: CLINIC | Age: 60
End: 2020-09-23
Payer: MEDICARE

## 2020-09-23 ENCOUNTER — TELEPHONE (OUTPATIENT)
Dept: FAMILY MEDICINE CLINIC | Facility: HOSPITAL | Age: 60
End: 2020-09-23

## 2020-09-23 ENCOUNTER — APPOINTMENT (OUTPATIENT)
Dept: RADIOLOGY | Facility: CLINIC | Age: 60
End: 2020-09-23
Payer: MEDICARE

## 2020-09-23 VITALS — WEIGHT: 193 LBS | HEIGHT: 67 IN | BODY MASS INDEX: 30.29 KG/M2 | TEMPERATURE: 98.6 F

## 2020-09-23 DIAGNOSIS — M20.11 HALLUX VALGUS, RIGHT: ICD-10-CM

## 2020-09-23 DIAGNOSIS — S92.412A CLOSED DISPLACED FRACTURE OF PROXIMAL PHALANX OF LEFT GREAT TOE, INITIAL ENCOUNTER: ICD-10-CM

## 2020-09-23 DIAGNOSIS — S82.61XA CLOSED FRACTURE OF DISTAL LATERAL MALLEOLUS OF ANKLE, RIGHT, INITIAL ENCOUNTER: Primary | ICD-10-CM

## 2020-09-23 DIAGNOSIS — T81.31XA DISRUPTION OF EXTERNAL SURGICAL WOUND, INITIAL ENCOUNTER: ICD-10-CM

## 2020-09-23 PROCEDURE — 73630 X-RAY EXAM OF FOOT: CPT

## 2020-09-23 PROCEDURE — 28495 TREAT BIG TOE FRACTURE: CPT | Performed by: PODIATRIST

## 2020-09-23 PROCEDURE — 99214 OFFICE O/P EST MOD 30 MIN: CPT | Performed by: PODIATRIST

## 2020-09-23 NOTE — PROGRESS NOTES
PATIENT:  Ki Liu  1960       ASSESSMENT:     1  Closed fracture of distal lateral malleolus of ankle, right, initial encounter  Cam Boot    Splint, Casting, Strapping   2  Closed displaced fracture of proximal phalanx of left great toe, initial encounter  Post Op Shoe    XR foot 3+ vw left    Orthopedic injury treatment   3  Disruption of external surgical wound, initial encounter               PLAN:  1  Patient was counseled and educated on the condition and the diagnosis  2  X-ray was obtained and personally reviewed  The radiological findings were discussed with the patient  3  The diagnosis, treatment options and prognosis were discussed with the patient  4  Wound is stable and acticoat and DSD applied  Short leg splint was applied to RLE and referred her for CAM walker  Instructed supportive care, resting, elevation, and icing  5  Closed reduction attempted for left great toe fracture  Still noted dorsal displacement  May need reduction under anesthesia  Calvin splint was applied to left 1st and 2nd toe  Sent her for surgical shoe  6  Patient will return on Monday for re-evaluation       Orthopedic injury treatment    Date/Time: 9/23/2020 11:05 AM  Performed by: Lenard Dinero DPM  Authorized by: Lenard Dinero DPM     Patient Location:  Bedside  Verbal consent obtained?: Yes    Risks and benefits: Risks, benefits and alternatives were discussed    Consent given by:  Patient  Patient states understanding of procedure being performed: Yes    Patient identity confirmed:  Verbally with patient  Time out: Immediately prior to the procedure a time out was called    Injury location:  Toe  Location details:  Left great toe  Injury type:  Fracture  Neurovascular status: Neurovascularly intact    Distal perfusion: normal    Neurological function: normal    Range of motion: normal    Manipulation performed?: Yes    Reduction successful?: No    Immobilization:  Tape  Distal perfusion: normal    Neurological function: normal    Range of motion: normal    Patient tolerance:  Patient tolerated the procedure well with no immediate complications    Splint, Casting, Strapping    Date/Time: 9/23/2020 11:09 AM  Performed by: Lenard Dinero DPM  Authorized by: Lenard Dinero DPM     Consent:     Consent obtained:  Verbal    Consent given by:  Patient    Risks discussed:  Pain, discoloration and numbness    Alternatives discussed:  Alternative treatment  Pre-procedure details:     Sensation:  Normal  Procedure details:     Laterality:  Right    Location:  Ankle    Ankle:  R ankle    Splint type:  Short leg    Supplies:  Ortho-Glass, cotton padding and elastic bandage          Subjective:       HPI  The patient presents with chief complaint of fracture on both feet  She fell on steps 1 week ago  She has swelling and pain on right ankle and left great toe  She went to ED and X-ray revealed right ankle fracture and left great toe fracture  She has also been dealing with wound dehiscence on right foot bunion incision site  She had bunion surgery in the beginning of August   Wound has been healing under the care of Dr Roscoe Moyer  She presents with splint on both LE  Her pain is still significant on right ankle  She was prescribed percocet in ED  The following portions of the patient's history were reviewed and updated as appropriate: allergies, current medications, past family history, past medical history, past social history, past surgical history and problem list   All pertinent labs and images were reviewed        Past Medical History  Past Medical History:   Diagnosis Date    Ankle sprain     left    Anxiety disorder     [facility])     Chronic back pain     CKD (chronic kidney disease) stage 3, GFR 30-59 ml/min ([facility])     stage 4 (as per pt)    CVA (cerebral vascular accident) ([address] )     noted on MRI in the past    Depression     GERD (gastroesophageal reflux disease)     Hypercholesteremia     Hyperlipidemia     Hypernatremia     Hypertension     Hypokalemia     Intervertebral disc disorder with radiculopathy of lumbosacral region     resolved: 2015    Kidney disease     Panic attacks     Pericardial effusion     PONV (postoperative nausea and vomiting)     Radiculitis     resolved: 2015    Secondary renal hyperparathyroidism [facility] )     Vitamin D deficiency        Past Surgical History  Past Surgical History:   Procedure Laterality Date    BUNIONECTOMY      Left foot     COLON SURGERY      COLONOSCOPY  2018    DILATION AND CURETTAGE OF UTERUS      INDUCED       surgically induced    [place] ANASTOMOSIS,AV,ANY SITE Left 2019    Procedure: CREATION FISTULA ARTERIOVENOUS (AV) left wrists possible left upper;  Surgeon: Tracy Pryor MD;  [facility] MAIN OR;  Service: Vascular    TX [place] W/SESMDC W/DIST Morelia Punt Left 2019    Procedure: Claritza Delgado;  Surgeon: Cj Soto DPM;  [facility] MA[facility];  Service: [address] [place] [address] W/SESMDC W/DIST Morelia Punt Right 8/3/2020    Procedure: Vangie Corea;  Surgeon: Cj Soto DPM;  [facility];  Service: Podiatry    TX ERCP DX COLLECTION SPECIMEN BRUSHING/WASHING N/A 2018    Procedure: ENDOSCOPIC RETROGRADE CHOLANGIOPANCREATOGRAPHY (ERCP);   Surgeon: Elías Dior MD;  [facility];  Service: Gastroenterology    TX LAP, SURG PROCTOPEXY N/A 2018    Procedure: ROBOTIC SIGMOID RESECTION / RECTOPEXY;  Surgeon: Leigh Gallego MD;  [facility] MA[facility];  Service: Colorectal    TX SIGMOIDOSCOPY FLX DX W/COLLJ SPEC BR/WA IF PFRMD N/A 2018    Procedure: Ean Mosqueda;  Surgeon: Leigh Gallego MD;  [facility]: [facility] MA[facility];  Service: Colorectal    TUBAL LIGATION Bilateral [address] [facility] THYROID BIOPSY  2019        Allergies:  Pollen extract    Medications:  Current Outpatient Medications Medication Sig Dispense Refill    acetaminophen (TYLENOL) 325 mg tablet Take 650 mg by mouth every 6 (six) hours as needed for mild pain      AMILoride 5 mg tablet Take 1 tablet by mouth twice daily 120 tablet 0    aspirin (ECOTRIN LOW STRENGTH) 81 mg EC tablet Take 1 tablet (81 mg total) by mouth daily 30 tablet 0    atorvastatin (LIPITOR) 40 mg tablet Take 1 tablet (40 mg total) by mouth daily 30 tablet 11    budesonide-formoterol (SYMBICORT) 160-4 5 mcg/act inhaler Inhale 2 puffs 2 (two) times a day Rinse mouth after use  3 Inhaler 3    carvedilol (COREG) 3 125 mg tablet Take 1 tablet (3 125 mg total) by mouth 2 (two) times a day with meals 180 tablet 3    clonazePAM (KlonoPIN) 0 5 mg tablet Take 1 tablet (0 5 mg total) by mouth 3 (three) times a day 270 tablet 0    fluocinonide (LIDEX) 0 05 % ointment Apply topically 2 (two) times a day 60 g 1    fluvoxaMINE (LUVOX) 100 mg tablet 3 at bedtime      lamoTRIgine (LaMICtal) 200 MG tablet Take 400 mg by mouth daily at bedtime       linaCLOtide (Linzess) 290 MCG CAPS Take 1 capsule by mouth daily 90 capsule 3    naloxone (NARCAN) 4 mg/0 1 mL nasal spray Administer 1 spray into a nostril  If breathing does not return to normal or if breathing difficulty resumes after 2-3 minutes, give another dose in the other nostril using a new spray   1 each 1    omeprazole (PriLOSEC) 20 mg delayed release capsule Take 1 capsule (20 mg total) by mouth daily at bedtime 90 capsule 3    ondansetron (ZOFRAN-ODT) 4 mg disintegrating tablet Dissolve 1 tablet in mouth every 8 hours prn nausea and vomiting 60 tablet 1    oxyCODONE-acetaminophen (PERCOCET) 5-325 mg per tablet Take 1 tablet by mouth every 6 (six) hours as needed for moderate pain for up to 10 daysMax Daily Amount: 4 tablets 30 tablet 0    Polyethylene Glycol 3350 (MIRALAX PO) Take by mouth as needed       QUEtiapine (SEROquel) 100 mg tablet Take 1 tablet by mouth daily at bedtime 1 at bedtime in addition to 400 mg tab      QUEtiapine (SEROQUEL) 300 mg tablet Take 1 tablet (300 mg total) by mouth every morning 1 in the AM     ( also takes 400 mg at bedtime) (Patient not taking: Reported on 9/11/2020) 90 tablet 3    QUEtiapine (SEROquel) 400 MG tablet 1 at bedtime     (also takes 100 mg and 300 mg tabs)      traMADol (ULTRAM) 50 mg tablet Take 2 tabs by mouth twice daily 120 tablet 0     No current facility-administered medications for this visit  Social History:  Social History     Socioeconomic History    Marital status:      Spouse name: None    Number of children: None    Years of education: None    Highest education level: None   Occupational History    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   Social Needs    Financial resource strain: None    Food insecurity     Worry: None     Inability: None    Transportation needs     Medical: No     Non-medical: No   Tobacco Use    Smoking status: Never Smoker    Smokeless tobacco: Never Used   Substance and Sexual Activity    Alcohol use: Never     Frequency: Never     Binge frequency: Never    Drug use: Not Currently     Types: Marijuana    Sexual activity: Not Currently   Lifestyle    Physical activity     Days per week: 0 days     Minutes per session: 0 min    Stress: To some extent   Relationships    Social connections     Talks on phone: None     Gets together: None     Attends [employer] service: None     Active member of club or organization: None     Attends meetings of clubs or organizations: None     Relationship status: None    Intimate partner violence     Fear of current or ex partner: No     Emotionally abused: No     Physically abused: No     Forced sexual activity: No   Other Topics Concern    None   Social History Narrative    Daily caffeine consumption 2-3 servings a day    Lives with family  No living will  Has dentures---no dental care  Primary language--English  Feels safe at home            Review of Systems Constitutional: Negative for chills and fever  HENT: Negative for sore throat  Respiratory: Negative  Cardiovascular: Negative  Gastrointestinal: Negative  Skin: Positive for wound  Neurological: Negative for light-headedness and numbness  Objective:      Temp 98 6 °F (37 °C)   Ht 5' 7" (1 702 m)   Wt 87 5 kg (193 lb) Comment: Verbal  BMI 30 23 kg/m²          Physical Exam  Vitals signs reviewed  Constitutional:       General: She is not in acute distress  Appearance: Normal appearance  She is obese  HENT:      Head: Normocephalic and atraumatic  Neck:      Musculoskeletal: Normal range of motion and neck supple  Cardiovascular:      Rate and Rhythm: Normal rate and regular rhythm  Pulses: Normal pulses  Comments: No cyanosis  CRT WNL all toes  Pulmonary:      Effort: Pulmonary effort is normal  No respiratory distress  Musculoskeletal:         General: Tenderness and signs of injury present  Right lower leg: Edema present  Comments: Diffuse right ankle edema, especially lateral aspect  Tender to touch and ROM right ankle  MMT and ROM exam guarded due to pain  Ecchymosis and edema left great toe with tenderness  No obvious deformity left great toe  Skin:     General: Skin is dry  Capillary Refill: Capillary refill takes less than 2 seconds  Coloration: Skin is not cyanotic or mottled  Findings: No erythema, petechiae or rash  Rash is not purpuric  Nails: There is no clubbing  Comments: Stable wound right dorsal 1st ray  Wound is granular  It is 0 5 X 0 4 cm  No deep probing  No signs of infection  Neurological:      General: No focal deficit present  Mental Status: She is alert and oriented to person, place, and time  Cranial Nerves: No cranial nerve deficit  Sensory: No sensory deficit  Motor: No weakness  Psychiatric:         Behavior: Behavior normal          Thought Content:  Thought content normal

## 2020-09-23 NOTE — TELEPHONE ENCOUNTER
I spoke with pt she uses [address], I updated her chart  She is aware Brooke Wynne is gone for the week and Fly not in until Tuesday   DD

## 2020-09-23 NOTE — TELEPHONE ENCOUNTER
Don't have a pharmacy in the system to send refill for pain med  She received 30 pills from ER on 9/16  Have to go to IC this afternoon, and I am off tomorrow

## 2020-09-24 ENCOUNTER — TELEPHONE (OUTPATIENT)
Dept: NEPHROLOGY | Facility: CLINIC | Age: 60
End: 2020-09-24

## 2020-09-24 DIAGNOSIS — M20.11 HALLUX VALGUS, RIGHT: ICD-10-CM

## 2020-09-24 NOTE — TELEPHONE ENCOUNTER
Patient called the office she misunderstood and thought her appt was a virtual  She did say she would call back to reschedule since she broke both her legs and cant even get out to get the blood work done right now

## 2020-09-25 RX ORDER — OXYCODONE HYDROCHLORIDE AND ACETAMINOPHEN 5; 325 MG/1; MG/1
1 TABLET ORAL EVERY 6 HOURS PRN
Qty: 30 TABLET | Refills: 0 | Status: SHIPPED | OUTPATIENT
Start: 2020-09-25 | End: 2020-10-05

## 2020-09-25 NOTE — TELEPHONE ENCOUNTER
PT called again asking for pain medication - she was in the ER on 9/16 and got 30 Percocet - if you would sent something in - she uses [facility]

## 2020-09-28 ENCOUNTER — OFFICE VISIT (OUTPATIENT)
Dept: PODIATRY | Facility: CLINIC | Age: 60
End: 2020-09-28
Payer: MEDICARE

## 2020-09-28 ENCOUNTER — APPOINTMENT (OUTPATIENT)
Dept: RADIOLOGY | Facility: CLINIC | Age: 60
End: 2020-09-28
Payer: MEDICARE

## 2020-09-28 VITALS — TEMPERATURE: 99.5 F | WEIGHT: 193 LBS | HEIGHT: 67 IN | BODY MASS INDEX: 30.29 KG/M2

## 2020-09-28 DIAGNOSIS — T81.31XA DISRUPTION OF EXTERNAL SURGICAL WOUND, INITIAL ENCOUNTER: ICD-10-CM

## 2020-09-28 DIAGNOSIS — S92.412A CLOSED DISPLACED FRACTURE OF PROXIMAL PHALANX OF LEFT GREAT TOE, INITIAL ENCOUNTER: ICD-10-CM

## 2020-09-28 DIAGNOSIS — S82.61XA CLOSED FRACTURE OF DISTAL LATERAL MALLEOLUS OF ANKLE, RIGHT, INITIAL ENCOUNTER: Primary | ICD-10-CM

## 2020-09-28 DIAGNOSIS — S82.61XA CLOSED FRACTURE OF DISTAL LATERAL MALLEOLUS OF ANKLE, RIGHT, INITIAL ENCOUNTER: ICD-10-CM

## 2020-09-28 PROCEDURE — 99213 OFFICE O/P EST LOW 20 MIN: CPT | Performed by: PODIATRIST

## 2020-09-28 PROCEDURE — 29405 APPL SHORT LEG CAST: CPT | Performed by: PODIATRIST

## 2020-09-28 PROCEDURE — 73660 X-RAY EXAM OF TOE(S): CPT

## 2020-09-28 PROCEDURE — 73610 X-RAY EXAM OF ANKLE: CPT

## 2020-09-28 NOTE — PROGRESS NOTES
This patient was seen on 9/11/20  Assessment:    Problem List Items Addressed This Visit        Other    Postop check - Primary          Plan:  I recommend xeroform to the incision area and DSD to be changed once daily  May shower  Must continue in surgical shoe  Diagnoses and all orders for this visit:    Postop check          Subjective:      Patient ID: Mary Jo Lynch is a 64 y o  female  Salma Rochester is here for a post-op appointment  She is using the surgical shoe  She admits she "bumped" the foot near the incision several days ago and had some transient pain  The following portions of the patient's history were reviewed and updated as appropriate: allergies, current medications, past family history, past medical history, past social history, past surgical history and problem list     Review of Systems   Constitutional: Negative  Respiratory: Negative  Objective:      /78   Temp 99 3 °F (37 4 °C)   Ht 5' 7" (1 702 m)   Wt 87 5 kg (193 lb)   BMI 30 23 kg/m²          Physical Exam  Vitals signs and nursing note reviewed  Constitutional:       General: She is not in acute distress  Appearance: Normal appearance  Pulmonary:      Effort: Pulmonary effort is normal       Breath sounds: Normal breath sounds  Neurological:      Mental Status: She is alert  The incision is slightly dehisced (1mm gap) without deep tissue exposure nor signs of infection)  The calf is soft and non-tender  Vascular status is intact to all toes with brisk capillary fill and intact pedal pulses and normal sensation  Normal post-op edema and bruising is noted

## 2020-09-28 NOTE — PROGRESS NOTES
PATIENT:  Kerwin Colvin  1960       ASSESSMENT:     1  Closed fracture of distal lateral malleolus of ankle, right, initial encounter  XR ankle 3+ vw right   2  Closed displaced fracture of proximal phalanx of left great toe, initial encounter  X-ray toe left 2+ views   3  Disruption of external surgical wound, initial encounter               PLAN:  1  Patient was counseled and educated on the condition and the diagnosis  2  X-ray was obtained again and personally reviewed  The radiological findings were discussed with the patient  3  The diagnosis, treatment options and prognosis were discussed with the patient  4  Slightly displaced fibular fracture now  Discussed options and will start cast immobilization  Monitor the fracture on left great toe with surgical shoe for now since her weight would go to left foot  5   NWB right foot  6  RA in 2 weeks for re-evaluation  Splint, Casting, Strapping    Date/Time: 9/28/2020 4:47 PM  Performed by: Shaunna Goldberg DPM  Authorized by: Shaunna Goldberg DPM     Consent:     Consent obtained:  Verbal    Consent given by:  Patient    Risks discussed:  Numbness and pain    Alternatives discussed:  Alternative treatment  Pre-procedure details:     Sensation:  Normal  Procedure details:     Laterality:  Right    Location:  Ankle    Ankle:  R ankleCast type:  Short leg    Supplies:  Fiberglass, cotton padding and sling          Subjective:       HPI  The patient presents for evaluation of right ankle and left great toe  She fell again and hurt right ankle  She also stubbed her left great toe against a dog cage  Pain is not better  She has been walking with CAM walker on right side and surgical shoe on left side  Her wound looks healed right foot         The following portions of the patient's history were reviewed and updated as appropriate: allergies, current medications, past family history, past medical history, past social history, past surgical history and problem list   All pertinent labs and images were reviewed  Past Medical History  Past Medical History:   Diagnosis Date    Ankle sprain     left    Anxiety disorder     Bipolar 2 disorder (HCC)     Chronic back pain     CKD (chronic kidney disease) stage 3, GFR 30-59 ml/min (HCC)     stage 4 (as per pt)    CVA (cerebral vascular accident) ([facility] [address] )     noted on MRI in the past    Depression     GERD (gastroesophageal reflux disease)     Hypercholesteremia     Hyperlipidemia     Hypernatremia     Hypertension     Hypokalemia     Intervertebral disc disorder with radiculopathy of lumbosacral region     resolved: 2015    Kidney disease     Panic attacks     Pericardial effusion     PONV (postoperative nausea and vomiting)     Radiculitis     resolved: 2015    Secondary renal hyperparathyroidism ([facility] [address] )     Vitamin D deficiency        Past Surgical History  Past Surgical History:   Procedure Laterality Date    BUNIONECTOMY      Left foot     COLON SURGERY      COLONOSCOPY  2018    DILATION AND CURETTAGE OF UTERUS      INDUCED       surgically induced    VA ANASTOMOSIS,AV,ANY SITE Left 2019    Procedure: CREATION FISTULA ARTERIOVENOUS (AV) left wrists possible left upper;  Surgeon: Vasile Fonseca MD;  [facility];  Service: Vascular    VA CORRJ HALLUX VALGUS W/SESMDC W/DIST METAR OSTEOT Left 2019    Procedure: Lo Levye;  Surgeon: Anish Talamantes DPM;  [facility];  Service: Podiatry    VA [place] W/SESMDC W/DIST Rodríguez Si Right 8/3/2020    Procedure: Edna Castillo;  Surgeon: Anish Talamantes DPM;  [facility];  Service: Podiatry    VA ERCP DX COLLECTION SPECIMEN BRUSHING/WASHING N/A 2018    Procedure: ENDOSCOPIC RETROGRADE CHOLANGIOPANCREATOGRAPHY (ERCP);   Surgeon: Elliott Pablo MD;  [facility];  Service: Gastroenterology    VA LAP, SURG PROCTOPEXY N/A 2018 Procedure: ROBOTIC SIGMOID RESECTION / RECTOPEXY;  Surgeon: Shiv Handley MD;  [facility];  Service: Colorectal    MO SIGMOIDOSCOPY FLX DX W/COLLJ SPEC BR/WA IF PFRMD N/A 7/13/2018    Procedure: SIGMOIDOSCOPY FLEXIBLE;  Surgeon: Shiv Handley MD;  [facility];  Service: Colorectal    TUBAL LIGATION Bilateral 1997    US GUIDED THYROID BIOPSY  7/30/2019        Allergies:  Pollen extract    Medications:  Current Outpatient Medications   Medication Sig Dispense Refill    acetaminophen (TYLENOL) 325 mg tablet Take 650 mg by mouth every 6 (six) hours as needed for mild pain      AMILoride 5 mg tablet Take 1 tablet by mouth twice daily 120 tablet 0    aspirin (ECOTRIN LOW STRENGTH) 81 mg EC tablet Take 1 tablet (81 mg total) by mouth daily 30 tablet 0    atorvastatin (LIPITOR) 40 mg tablet Take 1 tablet (40 mg total) by mouth daily 30 tablet 11    budesonide-formoterol (SYMBICORT) 160-4 5 mcg/act inhaler Inhale 2 puffs 2 (two) times a day Rinse mouth after use  3 Inhaler 3    carvedilol (COREG) 3 125 mg tablet Take 1 tablet (3 125 mg total) by mouth 2 (two) times a day with meals 180 tablet 3    clonazePAM (KlonoPIN) 0 5 mg tablet Take 1 tablet (0 5 mg total) by mouth 3 (three) times a day 270 tablet 0    fluocinonide (LIDEX) 0 05 % ointment Apply topically 2 (two) times a day 60 g 1    fluvoxaMINE (LUVOX) 100 mg tablet 3 at bedtime      lamoTRIgine (LaMICtal) 200 MG tablet Take 400 mg by mouth daily at bedtime       linaCLOtide (Linzess) 290 MCG CAPS Take 1 capsule by mouth daily 90 capsule 3    naloxone (NARCAN) 4 mg/0 1 mL nasal spray Administer 1 spray into a nostril  If breathing does not return to normal or if breathing difficulty resumes after 2-3 minutes, give another dose in the other nostril using a new spray   1 each 1    omeprazole (PriLOSEC) 20 mg delayed release capsule Take 1 capsule (20 mg total) by mouth daily at bedtime 90 capsule 3    ondansetron (ZOFRAN-ODT) 4 mg disintegrating tablet Dissolve 1 tablet in mouth every 8 hours prn nausea and vomiting 60 tablet 1    oxyCODONE-acetaminophen (PERCOCET) 5-325 mg per tablet Take 1 tablet by mouth every 6 (six) hours as needed for moderate pain for up to 10 daysMax Daily Amount: 4 tablets 30 tablet 0    Polyethylene Glycol 3350 (MIRALAX PO) Take by mouth as needed       QUEtiapine (SEROquel) 100 mg tablet Take 1 tablet by mouth daily at bedtime 1 at bedtime in addition to 400 mg tab      QUEtiapine (SEROQUEL) 300 mg tablet Take 1 tablet (300 mg total) by mouth every morning 1 in the AM     ( also takes 400 mg at bedtime) (Patient not taking: Reported on 9/11/2020) 90 tablet 3    QUEtiapine (SEROquel) 400 MG tablet 1 at bedtime     (also takes 100 mg and 300 mg tabs)      traMADol (ULTRAM) 50 mg tablet Take 2 tabs by mouth twice daily 120 tablet 0     No current facility-administered medications for this visit  Social History:  Social History     Socioeconomic History    Marital status:      Spouse name: None    Number of children: None    Years of education: None    Highest education level: None   Occupational History    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   Social Needs    Financial resource strain: None    Food insecurity     Worry: None     Inability: None    Transportation needs     Medical: No     Non-medical: No   Tobacco Use    Smoking status: Never Smoker    Smokeless tobacco: Never Used   Substance and Sexual Activity    Alcohol use: Never     Frequency: Never     Binge frequency: Never    Drug use: Not Currently     Types: Marijuana    Sexual activity: Not Currently   Lifestyle    Physical activity     Days per week: 0 days     Minutes per session: 0 min    Stress:  To some extent   Relationships    Social connections     Talks on phone: None     Gets together: None     Attends Rastafari service: None     Active member of club or organization: None     Attends meetings of clubs or organizations: None     Relationship status: None    Intimate partner violence     Fear of current or ex partner: No     Emotionally abused: No     Physically abused: No     Forced sexual activity: No   Other Topics Concern    None   Social History Narrative    Daily caffeine consumption 2-3 servings a day    Lives with family  No living will  Has dentures---no dental care  Primary language--English  Feels safe at home  Review of Systems   Constitutional: Negative for chills and fever  HENT: Negative for sore throat  Respiratory: Negative  Cardiovascular: Negative  Gastrointestinal: Negative  Skin: Positive for wound  Neurological: Negative for light-headedness and numbness  Objective:      Temp 99 5 °F (37 5 °C)   Ht 5' 7" (1 702 m)   Wt 87 5 kg (193 lb) Comment: Verbal  BMI 30 23 kg/m²          Physical Exam  Vitals signs reviewed  Constitutional:       General: She is not in acute distress  Appearance: Normal appearance  She is obese  Cardiovascular:      Rate and Rhythm: Normal rate and regular rhythm  Pulses: Normal pulses  Comments: No cyanosis  CRT WNL all toes  Pulmonary:      Effort: Pulmonary effort is normal  No respiratory distress  Musculoskeletal:         General: Tenderness and signs of injury present  Right lower leg: Edema present  Comments: Decreased right ankle edema  Tender to touch and ROM right ankle  Decreased ecchymosis  Edema noted left great toe with tenderness  No obvious deformity left great toe  Skin:     General: Skin is dry  Capillary Refill: Capillary refill takes less than 2 seconds  Coloration: Skin is not cyanotic or mottled  Findings: No erythema, petechiae or rash  Rash is not purpuric  Nails: There is no clubbing  Comments: Wound looks eithelized right dorsal 1st ray  No signs of infection  No drainage now       Neurological:      General: No focal deficit present  Mental Status: She is alert and oriented to person, place, and time  Cranial Nerves: No cranial nerve deficit  Sensory: No sensory deficit  Motor: No weakness  Psychiatric:         Behavior: Behavior normal          Thought Content:  Thought content normal

## 2020-09-29 DIAGNOSIS — S82.64XA CLOSED NONDISPLACED FRACTURE OF LATERAL MALLEOLUS OF RIGHT FIBULA, INITIAL ENCOUNTER: Primary | ICD-10-CM

## 2020-10-05 ENCOUNTER — TELEPHONE (OUTPATIENT)
Dept: FAMILY MEDICINE CLINIC | Facility: HOSPITAL | Age: 60
End: 2020-10-05

## 2020-10-06 DIAGNOSIS — M46.1 BILATERAL SACROILIITIS (HCC): ICD-10-CM

## 2020-10-06 RX ORDER — TRAMADOL HYDROCHLORIDE 50 MG/1
TABLET ORAL
Qty: 120 TABLET | Refills: 0 | Status: SHIPPED | OUTPATIENT
Start: 2020-10-06 | End: 2020-11-27 | Stop reason: SDUPTHER

## 2020-10-07 ENCOUNTER — OFFICE VISIT (OUTPATIENT)
Dept: PODIATRY | Facility: CLINIC | Age: 60
End: 2020-10-07
Payer: MEDICARE

## 2020-10-07 ENCOUNTER — TELEPHONE (OUTPATIENT)
Dept: GASTROENTEROLOGY | Facility: CLINIC | Age: 60
End: 2020-10-07

## 2020-10-07 VITALS — BODY MASS INDEX: 30.29 KG/M2 | WEIGHT: 193 LBS | HEIGHT: 67 IN | TEMPERATURE: 99.4 F

## 2020-10-07 DIAGNOSIS — S82.64XA CLOSED NONDISPLACED FRACTURE OF LATERAL MALLEOLUS OF RIGHT FIBULA, INITIAL ENCOUNTER: Primary | ICD-10-CM

## 2020-10-07 PROCEDURE — 29405 APPL SHORT LEG CAST: CPT | Performed by: PODIATRIST

## 2020-10-19 ENCOUNTER — TELEPHONE (OUTPATIENT)
Dept: FAMILY MEDICINE CLINIC | Facility: HOSPITAL | Age: 60
End: 2020-10-19

## 2020-10-21 ENCOUNTER — APPOINTMENT (OUTPATIENT)
Dept: RADIOLOGY | Facility: CLINIC | Age: 60
End: 2020-10-21
Payer: MEDICARE

## 2020-10-21 ENCOUNTER — TELEPHONE (OUTPATIENT)
Dept: NEPHROLOGY | Facility: CLINIC | Age: 60
End: 2020-10-21

## 2020-10-21 ENCOUNTER — OFFICE VISIT (OUTPATIENT)
Dept: PODIATRY | Facility: CLINIC | Age: 60
End: 2020-10-21
Payer: MEDICARE

## 2020-10-21 ENCOUNTER — LAB (OUTPATIENT)
Dept: LAB | Facility: HOSPITAL | Age: 60
End: 2020-10-21
Attending: INTERNAL MEDICINE
Payer: MEDICARE

## 2020-10-21 ENCOUNTER — TELEPHONE (OUTPATIENT)
Dept: PODIATRY | Facility: CLINIC | Age: 60
End: 2020-10-21

## 2020-10-21 VITALS — WEIGHT: 193 LBS | HEIGHT: 67 IN | BODY MASS INDEX: 30.29 KG/M2 | TEMPERATURE: 98.5 F

## 2020-10-21 DIAGNOSIS — I10 ESSENTIAL HYPERTENSION: ICD-10-CM

## 2020-10-21 DIAGNOSIS — S92.412A CLOSED DISPLACED FRACTURE OF PROXIMAL PHALANX OF LEFT GREAT TOE, INITIAL ENCOUNTER: ICD-10-CM

## 2020-10-21 DIAGNOSIS — S82.64XA CLOSED NONDISPLACED FRACTURE OF LATERAL MALLEOLUS OF RIGHT FIBULA, INITIAL ENCOUNTER: Primary | ICD-10-CM

## 2020-10-21 DIAGNOSIS — I31.3 PERICARDIAL EFFUSION: ICD-10-CM

## 2020-10-21 DIAGNOSIS — F31.4 BIPOLAR 1 DISORDER, DEPRESSED, SEVERE (HCC): ICD-10-CM

## 2020-10-21 DIAGNOSIS — F31.81 BIPOLAR 2 DISORDER (HCC): Chronic | ICD-10-CM

## 2020-10-21 DIAGNOSIS — N18.4 CKD (CHRONIC KIDNEY DISEASE) STAGE 4, GFR 15-29 ML/MIN (HCC): ICD-10-CM

## 2020-10-21 DIAGNOSIS — S82.64XA CLOSED NONDISPLACED FRACTURE OF LATERAL MALLEOLUS OF RIGHT FIBULA, INITIAL ENCOUNTER: ICD-10-CM

## 2020-10-21 DIAGNOSIS — E21.3 HYPERPARATHYROIDISM (HCC): ICD-10-CM

## 2020-10-21 DIAGNOSIS — N25.81 SECONDARY RENAL HYPERPARATHYROIDISM (HCC): ICD-10-CM

## 2020-10-21 DIAGNOSIS — I77.0 AVF (ARTERIOVENOUS FISTULA) (HCC): ICD-10-CM

## 2020-10-21 LAB
ALBUMIN SERPL BCP-MCNC: 4.1 G/DL (ref 3.5–5)
ALP SERPL-CCNC: 309 U/L (ref 46–116)
ALT SERPL W P-5'-P-CCNC: 24 U/L (ref 12–78)
ANION GAP SERPL CALCULATED.3IONS-SCNC: 6 MMOL/L (ref 4–13)
AST SERPL W P-5'-P-CCNC: 25 U/L (ref 5–45)
BACTERIA UR QL AUTO: NORMAL /HPF
BASOPHILS # BLD AUTO: 0.1 THOUSANDS/ΜL (ref 0–0.1)
BASOPHILS NFR BLD AUTO: 2 % (ref 0–1)
BILIRUB SERPL-MCNC: 0.28 MG/DL (ref 0.2–1)
BILIRUB UR QL STRIP: NEGATIVE
BUN SERPL-MCNC: 43 MG/DL (ref 5–25)
CALCIUM SERPL-MCNC: 9.9 MG/DL (ref 8.3–10.1)
CHLORIDE SERPL-SCNC: 112 MMOL/L (ref 100–108)
CLARITY UR: CLEAR
CO2 SERPL-SCNC: 22 MMOL/L (ref 21–32)
COLOR UR: YELLOW
CREAT SERPL-MCNC: 2.38 MG/DL (ref 0.6–1.3)
CREAT UR-MCNC: 97.1 MG/DL
EOSINOPHIL # BLD AUTO: 0.62 THOUSAND/ΜL (ref 0–0.61)
EOSINOPHIL NFR BLD AUTO: 12 % (ref 0–6)
ERYTHROCYTE [DISTWIDTH] IN BLOOD BY AUTOMATED COUNT: 13.5 % (ref 11.6–15.1)
GFR SERPL CREATININE-BSD FRML MDRD: 22 ML/MIN/1.73SQ M
GLUCOSE P FAST SERPL-MCNC: 98 MG/DL (ref 65–99)
GLUCOSE UR STRIP-MCNC: NEGATIVE MG/DL
HCT VFR BLD AUTO: 40.3 % (ref 34.8–46.1)
HGB BLD-MCNC: 12.4 G/DL (ref 11.5–15.4)
HGB UR QL STRIP.AUTO: NEGATIVE
HYALINE CASTS #/AREA URNS LPF: NORMAL /LPF
IMM GRANULOCYTES # BLD AUTO: 0.02 THOUSAND/UL (ref 0–0.2)
IMM GRANULOCYTES NFR BLD AUTO: 0 % (ref 0–2)
KETONES UR STRIP-MCNC: NEGATIVE MG/DL
LEUKOCYTE ESTERASE UR QL STRIP: NEGATIVE
LYMPHOCYTES # BLD AUTO: 1.53 THOUSANDS/ΜL (ref 0.6–4.47)
LYMPHOCYTES NFR BLD AUTO: 29 % (ref 14–44)
MAGNESIUM SERPL-MCNC: 2.5 MG/DL (ref 1.6–2.6)
MCH RBC QN AUTO: 31.5 PG (ref 26.8–34.3)
MCHC RBC AUTO-ENTMCNC: 30.8 G/DL (ref 31.4–37.4)
MCV RBC AUTO: 102 FL (ref 82–98)
MONOCYTES # BLD AUTO: 0.6 THOUSAND/ΜL (ref 0.17–1.22)
MONOCYTES NFR BLD AUTO: 11 % (ref 4–12)
NEUTROPHILS # BLD AUTO: 2.41 THOUSANDS/ΜL (ref 1.85–7.62)
NEUTS SEG NFR BLD AUTO: 46 % (ref 43–75)
NITRITE UR QL STRIP: NEGATIVE
NON-SQ EPI CELLS URNS QL MICRO: NORMAL /HPF
NRBC BLD AUTO-RTO: 0 /100 WBCS
PH UR STRIP.AUTO: 6 [PH]
PHOSPHATE SERPL-MCNC: 4.2 MG/DL (ref 2.7–4.5)
PLATELET # BLD AUTO: 236 THOUSANDS/UL (ref 149–390)
PMV BLD AUTO: 11 FL (ref 8.9–12.7)
POTASSIUM SERPL-SCNC: 4.6 MMOL/L (ref 3.5–5.3)
PROT SERPL-MCNC: 7.8 G/DL (ref 6.4–8.2)
PROT UR STRIP-MCNC: ABNORMAL MG/DL
PROT UR-MCNC: 27 MG/DL
PROT/CREAT UR: 0.28 MG/G{CREAT} (ref 0–0.1)
PTH-INTACT SERPL-MCNC: 169.6 PG/ML (ref 18.4–80.1)
RBC # BLD AUTO: 3.94 MILLION/UL (ref 3.81–5.12)
RBC #/AREA URNS AUTO: NORMAL /HPF
SODIUM SERPL-SCNC: 140 MMOL/L (ref 136–145)
SP GR UR STRIP.AUTO: 1.02 (ref 1–1.03)
URATE SERPL-MCNC: 6.5 MG/DL (ref 2–6.8)
UROBILINOGEN UR QL STRIP.AUTO: 0.2 E.U./DL
WBC # BLD AUTO: 5.28 THOUSAND/UL (ref 4.31–10.16)
WBC #/AREA URNS AUTO: NORMAL /HPF

## 2020-10-21 PROCEDURE — 99213 OFFICE O/P EST LOW 20 MIN: CPT | Performed by: PODIATRIST

## 2020-10-21 PROCEDURE — 83735 ASSAY OF MAGNESIUM: CPT

## 2020-10-21 PROCEDURE — 84156 ASSAY OF PROTEIN URINE: CPT

## 2020-10-21 PROCEDURE — 80053 COMPREHEN METABOLIC PANEL: CPT

## 2020-10-21 PROCEDURE — 73630 X-RAY EXAM OF FOOT: CPT

## 2020-10-21 PROCEDURE — 36415 COLL VENOUS BLD VENIPUNCTURE: CPT

## 2020-10-21 PROCEDURE — 83970 ASSAY OF PARATHORMONE: CPT

## 2020-10-21 PROCEDURE — 84550 ASSAY OF BLOOD/URIC ACID: CPT

## 2020-10-21 PROCEDURE — 81001 URINALYSIS AUTO W/SCOPE: CPT | Performed by: INTERNAL MEDICINE

## 2020-10-21 PROCEDURE — 73610 X-RAY EXAM OF ANKLE: CPT

## 2020-10-21 PROCEDURE — 82570 ASSAY OF URINE CREATININE: CPT

## 2020-10-21 PROCEDURE — 85025 COMPLETE CBC W/AUTO DIFF WBC: CPT

## 2020-10-21 PROCEDURE — 84100 ASSAY OF PHOSPHORUS: CPT

## 2020-10-21 RX ORDER — CLONAZEPAM 0.5 MG/1
0.5 TABLET ORAL 3 TIMES DAILY
Qty: 270 TABLET | Refills: 0 | Status: SHIPPED | OUTPATIENT
Start: 2020-10-21 | End: 2021-01-11 | Stop reason: SDUPTHER

## 2020-10-24 ENCOUNTER — TELEPHONE (OUTPATIENT)
Dept: OTHER | Facility: OTHER | Age: 60
End: 2020-10-24

## 2020-10-26 ENCOUNTER — TELEPHONE (OUTPATIENT)
Dept: NEPHROLOGY | Facility: CLINIC | Age: 60
End: 2020-10-26

## 2020-10-26 DIAGNOSIS — N39.0 URINARY TRACT INFECTION: ICD-10-CM

## 2020-10-26 DIAGNOSIS — R53.1 GENERALIZED WEAKNESS: ICD-10-CM

## 2020-10-27 ENCOUNTER — TELEPHONE (OUTPATIENT)
Dept: NEPHROLOGY | Facility: CLINIC | Age: 60
End: 2020-10-27

## 2020-10-27 RX ORDER — ONDANSETRON 4 MG/1
TABLET, ORALLY DISINTEGRATING ORAL
Qty: 60 TABLET | Refills: 1 | Status: SHIPPED | OUTPATIENT
Start: 2020-10-27 | End: 2021-01-18

## 2020-10-28 ENCOUNTER — OFFICE VISIT (OUTPATIENT)
Dept: PODIATRY | Facility: CLINIC | Age: 60
End: 2020-10-28
Payer: MEDICARE

## 2020-10-28 ENCOUNTER — APPOINTMENT (OUTPATIENT)
Dept: RADIOLOGY | Facility: CLINIC | Age: 60
End: 2020-10-28
Payer: MEDICARE

## 2020-10-28 DIAGNOSIS — S92.412A CLOSED DISPLACED FRACTURE OF PROXIMAL PHALANX OF LEFT GREAT TOE, INITIAL ENCOUNTER: ICD-10-CM

## 2020-10-28 DIAGNOSIS — S82.64XA CLOSED NONDISPLACED FRACTURE OF LATERAL MALLEOLUS OF RIGHT FIBULA, INITIAL ENCOUNTER: Primary | ICD-10-CM

## 2020-10-28 DIAGNOSIS — S82.64XA CLOSED NONDISPLACED FRACTURE OF LATERAL MALLEOLUS OF RIGHT FIBULA, INITIAL ENCOUNTER: ICD-10-CM

## 2020-10-28 PROCEDURE — 99213 OFFICE O/P EST LOW 20 MIN: CPT | Performed by: PODIATRIST

## 2020-10-28 PROCEDURE — 73610 X-RAY EXAM OF ANKLE: CPT

## 2020-10-28 PROCEDURE — 29405 APPL SHORT LEG CAST: CPT | Performed by: PODIATRIST

## 2020-10-29 ENCOUNTER — TELEPHONE (OUTPATIENT)
Dept: PODIATRY | Facility: CLINIC | Age: 60
End: 2020-10-29

## 2020-11-04 ENCOUNTER — TELEPHONE (OUTPATIENT)
Dept: FAMILY MEDICINE CLINIC | Facility: HOSPITAL | Age: 60
End: 2020-11-04

## 2020-11-04 ENCOUNTER — OFFICE VISIT (OUTPATIENT)
Dept: PODIATRY | Facility: CLINIC | Age: 60
End: 2020-11-04
Payer: MEDICARE

## 2020-11-04 VITALS — BODY MASS INDEX: 30.29 KG/M2 | WEIGHT: 193 LBS | TEMPERATURE: 99.3 F | HEIGHT: 67 IN

## 2020-11-04 DIAGNOSIS — S92.412A CLOSED DISPLACED FRACTURE OF PROXIMAL PHALANX OF LEFT GREAT TOE, INITIAL ENCOUNTER: ICD-10-CM

## 2020-11-04 DIAGNOSIS — S82.64XA CLOSED NONDISPLACED FRACTURE OF LATERAL MALLEOLUS OF RIGHT FIBULA, INITIAL ENCOUNTER: Primary | ICD-10-CM

## 2020-11-04 DIAGNOSIS — M20.11 ACQUIRED HALLUX INTERPHALANGEUS OF RIGHT FOOT: Primary | ICD-10-CM

## 2020-11-04 PROBLEM — S82.831D CLOSED FRACTURE OF DISTAL END OF RIGHT FIBULA WITH ROUTINE HEALING: Status: ACTIVE | Noted: 2020-11-04

## 2020-11-04 PROCEDURE — 99024 POSTOP FOLLOW-UP VISIT: CPT | Performed by: PODIATRIST

## 2020-11-04 PROCEDURE — 29405 APPL SHORT LEG CAST: CPT | Performed by: PODIATRIST

## 2020-11-04 RX ORDER — NAPROXEN 500 MG/1
500 TABLET ORAL
Qty: 30 TABLET | Refills: 1 | Status: SHIPPED | OUTPATIENT
Start: 2020-11-04 | End: 2020-12-10

## 2020-11-09 ENCOUNTER — TELEPHONE (OUTPATIENT)
Dept: FAMILY MEDICINE CLINIC | Facility: HOSPITAL | Age: 60
End: 2020-11-09

## 2020-11-10 DIAGNOSIS — S82.64XD CLOSED NONDISPLACED FRACTURE OF LATERAL MALLEOLUS OF RIGHT FIBULA WITH ROUTINE HEALING, SUBSEQUENT ENCOUNTER: Primary | ICD-10-CM

## 2020-11-10 RX ORDER — HYDROCODONE BITARTRATE AND ACETAMINOPHEN 5; 325 MG/1; MG/1
1 TABLET ORAL EVERY 6 HOURS PRN
Qty: 20 TABLET | Refills: 0 | Status: SHIPPED | OUTPATIENT
Start: 2020-11-10 | End: 2020-11-15

## 2020-11-10 RX ORDER — HYDROCODONE BITARTRATE AND ACETAMINOPHEN 5; 325 MG/1; MG/1
1 TABLET ORAL EVERY 6 HOURS PRN
Qty: 20 TABLET | Refills: 0 | Status: SHIPPED | OUTPATIENT
Start: 2020-11-10 | End: 2020-11-10 | Stop reason: SDUPTHER

## 2020-11-13 DIAGNOSIS — I10 HTN (HYPERTENSION): ICD-10-CM

## 2020-11-13 RX ORDER — AMILORIDE HYDROCHLORIDE 5 MG/1
TABLET ORAL
Qty: 120 TABLET | Refills: 0 | Status: SHIPPED | OUTPATIENT
Start: 2020-11-13 | End: 2020-11-17 | Stop reason: SDUPTHER

## 2020-11-17 DIAGNOSIS — I10 HTN (HYPERTENSION): ICD-10-CM

## 2020-11-17 RX ORDER — AMILORIDE HYDROCHLORIDE 5 MG/1
5 TABLET ORAL 2 TIMES DAILY
Qty: 120 TABLET | Refills: 3 | Status: SHIPPED | OUTPATIENT
Start: 2020-11-17 | End: 2020-11-23

## 2020-11-18 ENCOUNTER — APPOINTMENT (OUTPATIENT)
Dept: RADIOLOGY | Facility: CLINIC | Age: 60
End: 2020-11-18
Payer: MEDICARE

## 2020-11-18 ENCOUNTER — OFFICE VISIT (OUTPATIENT)
Dept: PODIATRY | Facility: CLINIC | Age: 60
End: 2020-11-18
Payer: MEDICARE

## 2020-11-18 VITALS — WEIGHT: 193 LBS | HEIGHT: 67 IN | TEMPERATURE: 98.6 F | BODY MASS INDEX: 30.29 KG/M2

## 2020-11-18 DIAGNOSIS — S82.64XA CLOSED NONDISPLACED FRACTURE OF LATERAL MALLEOLUS OF RIGHT FIBULA, INITIAL ENCOUNTER: ICD-10-CM

## 2020-11-18 DIAGNOSIS — S82.64XA CLOSED NONDISPLACED FRACTURE OF LATERAL MALLEOLUS OF RIGHT FIBULA, INITIAL ENCOUNTER: Primary | ICD-10-CM

## 2020-11-18 DIAGNOSIS — S92.412A CLOSED DISPLACED FRACTURE OF PROXIMAL PHALANX OF LEFT GREAT TOE, INITIAL ENCOUNTER: ICD-10-CM

## 2020-11-18 PROCEDURE — 99213 OFFICE O/P EST LOW 20 MIN: CPT | Performed by: PODIATRIST

## 2020-11-18 PROCEDURE — 73610 X-RAY EXAM OF ANKLE: CPT

## 2020-11-18 PROCEDURE — 73660 X-RAY EXAM OF TOE(S): CPT

## 2020-11-19 ENCOUNTER — TELEPHONE (OUTPATIENT)
Dept: NEPHROLOGY | Facility: CLINIC | Age: 60
End: 2020-11-19

## 2020-11-19 DIAGNOSIS — I10 ESSENTIAL HYPERTENSION: Primary | ICD-10-CM

## 2020-11-19 DIAGNOSIS — N18.4 STAGE 4 CHRONIC KIDNEY DISEASE (HCC): ICD-10-CM

## 2020-11-20 DIAGNOSIS — I10 HTN (HYPERTENSION): ICD-10-CM

## 2020-11-20 DIAGNOSIS — S82.64XD CLOSED NONDISPLACED FRACTURE OF LATERAL MALLEOLUS OF RIGHT FIBULA WITH ROUTINE HEALING, SUBSEQUENT ENCOUNTER: Primary | ICD-10-CM

## 2020-11-20 DIAGNOSIS — S82.64XD CLOSED NONDISPLACED FRACTURE OF LATERAL MALLEOLUS OF RIGHT FIBULA WITH ROUTINE HEALING, SUBSEQUENT ENCOUNTER: ICD-10-CM

## 2020-11-20 RX ORDER — HYDROCODONE BITARTRATE AND ACETAMINOPHEN 5; 325 MG/1; MG/1
1 TABLET ORAL EVERY 6 HOURS PRN
Qty: 20 TABLET | Refills: 0 | Status: SHIPPED | OUTPATIENT
Start: 2020-11-20 | End: 2020-11-27 | Stop reason: SDUPTHER

## 2020-11-20 RX ORDER — HYDROCODONE BITARTRATE AND ACETAMINOPHEN 5; 325 MG/1; MG/1
1 TABLET ORAL EVERY 6 HOURS PRN
Qty: 20 TABLET | Refills: 0 | Status: SHIPPED | OUTPATIENT
Start: 2020-11-20 | End: 2020-11-20 | Stop reason: SDUPTHER

## 2020-11-23 RX ORDER — AMILORIDE HYDROCHLORIDE 5 MG/1
TABLET ORAL
Qty: 120 TABLET | Refills: 0 | Status: SHIPPED | OUTPATIENT
Start: 2020-11-23 | End: 2021-01-22

## 2020-11-25 ENCOUNTER — OFFICE VISIT (OUTPATIENT)
Dept: FAMILY MEDICINE CLINIC | Facility: HOSPITAL | Age: 60
End: 2020-11-25
Payer: MEDICARE

## 2020-11-25 VITALS
OXYGEN SATURATION: 94 % | DIASTOLIC BLOOD PRESSURE: 90 MMHG | HEIGHT: 67 IN | SYSTOLIC BLOOD PRESSURE: 154 MMHG | HEART RATE: 104 BPM | BODY MASS INDEX: 30.23 KG/M2

## 2020-11-25 DIAGNOSIS — S82.831D CLOSED FRACTURE OF DISTAL END OF RIGHT FIBULA WITH ROUTINE HEALING, UNSPECIFIED FRACTURE MORPHOLOGY, SUBSEQUENT ENCOUNTER: ICD-10-CM

## 2020-11-25 DIAGNOSIS — H61.21 HEARING LOSS OF RIGHT EAR DUE TO CERUMEN IMPACTION: ICD-10-CM

## 2020-11-25 DIAGNOSIS — K86.1 IDIOPATHIC CHRONIC PANCREATITIS (HCC): ICD-10-CM

## 2020-11-25 DIAGNOSIS — N18.4 CKD (CHRONIC KIDNEY DISEASE) STAGE 4, GFR 15-29 ML/MIN (HCC): ICD-10-CM

## 2020-11-25 DIAGNOSIS — F31.4 BIPOLAR 1 DISORDER, DEPRESSED, SEVERE (HCC): ICD-10-CM

## 2020-11-25 DIAGNOSIS — I63.9 INFARCTION OF LEFT BASAL GANGLIA (HCC): ICD-10-CM

## 2020-11-25 DIAGNOSIS — M20.11 ACQUIRED HALLUX INTERPHALANGEUS OF RIGHT FOOT: ICD-10-CM

## 2020-11-25 DIAGNOSIS — Z00.00 MEDICARE ANNUAL WELLNESS VISIT, INITIAL: Primary | ICD-10-CM

## 2020-11-25 PROCEDURE — G0438 PPPS, INITIAL VISIT: HCPCS | Performed by: INTERNAL MEDICINE

## 2020-11-27 DIAGNOSIS — M46.1 BILATERAL SACROILIITIS (HCC): ICD-10-CM

## 2020-11-27 DIAGNOSIS — S82.64XD CLOSED NONDISPLACED FRACTURE OF LATERAL MALLEOLUS OF RIGHT FIBULA WITH ROUTINE HEALING, SUBSEQUENT ENCOUNTER: ICD-10-CM

## 2020-11-27 RX ORDER — TRAMADOL HYDROCHLORIDE 50 MG/1
TABLET ORAL
Qty: 120 TABLET | Refills: 0 | Status: SHIPPED | OUTPATIENT
Start: 2020-11-27 | End: 2020-12-28 | Stop reason: SDUPTHER

## 2020-11-27 RX ORDER — HYDROCODONE BITARTRATE AND ACETAMINOPHEN 5; 325 MG/1; MG/1
1 TABLET ORAL EVERY 6 HOURS PRN
Qty: 20 TABLET | Refills: 0 | Status: SHIPPED | OUTPATIENT
Start: 2020-11-27 | End: 2021-06-02 | Stop reason: SDUPTHER

## 2020-12-03 DIAGNOSIS — K59.00 CONSTIPATION, UNSPECIFIED CONSTIPATION TYPE: ICD-10-CM

## 2020-12-03 RX ORDER — LINACLOTIDE 290 UG/1
290 CAPSULE, GELATIN COATED ORAL DAILY
Qty: 90 CAPSULE | Refills: 3 | Status: SHIPPED | OUTPATIENT
Start: 2020-12-03 | End: 2021-10-15 | Stop reason: SDUPTHER

## 2020-12-04 ENCOUNTER — LAB (OUTPATIENT)
Dept: LAB | Facility: HOSPITAL | Age: 60
End: 2020-12-04
Attending: INTERNAL MEDICINE
Payer: MEDICARE

## 2020-12-04 DIAGNOSIS — N18.4 STAGE 4 CHRONIC KIDNEY DISEASE (HCC): ICD-10-CM

## 2020-12-04 DIAGNOSIS — I10 ESSENTIAL HYPERTENSION: ICD-10-CM

## 2020-12-04 LAB
ANION GAP SERPL CALCULATED.3IONS-SCNC: 6 MMOL/L (ref 4–13)
BUN SERPL-MCNC: 26 MG/DL (ref 5–25)
CALCIUM SERPL-MCNC: 8.9 MG/DL (ref 8.3–10.1)
CHLORIDE SERPL-SCNC: 115 MMOL/L (ref 100–108)
CO2 SERPL-SCNC: 27 MMOL/L (ref 21–32)
CREAT SERPL-MCNC: 1.93 MG/DL (ref 0.6–1.3)
ERYTHROCYTE [DISTWIDTH] IN BLOOD BY AUTOMATED COUNT: 14.5 % (ref 11.6–15.1)
GFR SERPL CREATININE-BSD FRML MDRD: 28 ML/MIN/1.73SQ M
GLUCOSE P FAST SERPL-MCNC: 87 MG/DL (ref 65–99)
HCT VFR BLD AUTO: 37.6 % (ref 34.8–46.1)
HGB BLD-MCNC: 11.3 G/DL (ref 11.5–15.4)
MCH RBC QN AUTO: 31.4 PG (ref 26.8–34.3)
MCHC RBC AUTO-ENTMCNC: 30.1 G/DL (ref 31.4–37.4)
MCV RBC AUTO: 104 FL (ref 82–98)
PLATELET # BLD AUTO: 311 THOUSANDS/UL (ref 149–390)
PMV BLD AUTO: 11.2 FL (ref 8.9–12.7)
POTASSIUM SERPL-SCNC: 4.6 MMOL/L (ref 3.5–5.3)
RBC # BLD AUTO: 3.6 MILLION/UL (ref 3.81–5.12)
SODIUM SERPL-SCNC: 148 MMOL/L (ref 136–145)
WBC # BLD AUTO: 6.31 THOUSAND/UL (ref 4.31–10.16)

## 2020-12-04 PROCEDURE — 80048 BASIC METABOLIC PNL TOTAL CA: CPT

## 2020-12-04 PROCEDURE — 36415 COLL VENOUS BLD VENIPUNCTURE: CPT

## 2020-12-04 PROCEDURE — 85027 COMPLETE CBC AUTOMATED: CPT

## 2020-12-09 ENCOUNTER — TELEPHONE (OUTPATIENT)
Dept: NEPHROLOGY | Facility: CLINIC | Age: 60
End: 2020-12-09

## 2020-12-10 ENCOUNTER — OFFICE VISIT (OUTPATIENT)
Dept: NEPHROLOGY | Facility: HOSPITAL | Age: 60
End: 2020-12-10
Payer: MEDICARE

## 2020-12-10 VITALS
WEIGHT: 215 LBS | DIASTOLIC BLOOD PRESSURE: 76 MMHG | HEIGHT: 67 IN | TEMPERATURE: 98.1 F | RESPIRATION RATE: 16 BRPM | HEART RATE: 92 BPM | BODY MASS INDEX: 33.74 KG/M2 | SYSTOLIC BLOOD PRESSURE: 128 MMHG

## 2020-12-10 DIAGNOSIS — I10 ESSENTIAL HYPERTENSION: ICD-10-CM

## 2020-12-10 DIAGNOSIS — N25.81 SECONDARY RENAL HYPERPARATHYROIDISM (HCC): ICD-10-CM

## 2020-12-10 DIAGNOSIS — N18.4 CKD (CHRONIC KIDNEY DISEASE) STAGE 4, GFR 15-29 ML/MIN (HCC): Primary | ICD-10-CM

## 2020-12-10 DIAGNOSIS — I77.0 AVF (ARTERIOVENOUS FISTULA) (HCC): ICD-10-CM

## 2020-12-10 DIAGNOSIS — E21.3 HYPERPARATHYROIDISM (HCC): ICD-10-CM

## 2020-12-10 PROCEDURE — 99214 OFFICE O/P EST MOD 30 MIN: CPT | Performed by: INTERNAL MEDICINE

## 2020-12-11 ENCOUNTER — TELEPHONE (OUTPATIENT)
Dept: FAMILY MEDICINE CLINIC | Facility: HOSPITAL | Age: 60
End: 2020-12-11

## 2020-12-11 DIAGNOSIS — S82.831D CLOSED FRACTURE OF DISTAL END OF RIGHT FIBULA WITH ROUTINE HEALING, UNSPECIFIED FRACTURE MORPHOLOGY, SUBSEQUENT ENCOUNTER: ICD-10-CM

## 2020-12-11 DIAGNOSIS — G89.4 CHRONIC PAIN SYNDROME: Primary | ICD-10-CM

## 2020-12-11 RX ORDER — DULOXETIN HYDROCHLORIDE 20 MG/1
20 CAPSULE, DELAYED RELEASE ORAL DAILY
Qty: 30 CAPSULE | Refills: 3 | Status: SHIPPED | OUTPATIENT
Start: 2020-12-11 | End: 2020-12-28

## 2020-12-16 ENCOUNTER — APPOINTMENT (OUTPATIENT)
Dept: RADIOLOGY | Facility: CLINIC | Age: 60
End: 2020-12-16
Payer: MEDICARE

## 2020-12-16 ENCOUNTER — OFFICE VISIT (OUTPATIENT)
Dept: PODIATRY | Facility: CLINIC | Age: 60
End: 2020-12-16

## 2020-12-16 VITALS — HEIGHT: 67 IN | WEIGHT: 215 LBS | TEMPERATURE: 98.5 F | BODY MASS INDEX: 33.74 KG/M2

## 2020-12-16 DIAGNOSIS — S82.64XA CLOSED NONDISPLACED FRACTURE OF LATERAL MALLEOLUS OF RIGHT FIBULA, INITIAL ENCOUNTER: ICD-10-CM

## 2020-12-16 DIAGNOSIS — S92.412A CLOSED DISPLACED FRACTURE OF PROXIMAL PHALANX OF LEFT GREAT TOE, INITIAL ENCOUNTER: ICD-10-CM

## 2020-12-16 DIAGNOSIS — S82.64XA CLOSED NONDISPLACED FRACTURE OF LATERAL MALLEOLUS OF RIGHT FIBULA, INITIAL ENCOUNTER: Primary | ICD-10-CM

## 2020-12-16 DIAGNOSIS — M21.611 BUNION, RIGHT: ICD-10-CM

## 2020-12-16 PROCEDURE — 73610 X-RAY EXAM OF ANKLE: CPT

## 2020-12-16 PROCEDURE — 99024 POSTOP FOLLOW-UP VISIT: CPT | Performed by: PODIATRIST

## 2020-12-28 ENCOUNTER — TELEPHONE (OUTPATIENT)
Dept: FAMILY MEDICINE CLINIC | Facility: HOSPITAL | Age: 60
End: 2020-12-28

## 2020-12-28 ENCOUNTER — EVALUATION (OUTPATIENT)
Dept: PHYSICAL THERAPY | Facility: CLINIC | Age: 60
End: 2020-12-28
Payer: MEDICARE

## 2020-12-28 DIAGNOSIS — S82.64XD CLOSED NONDISPLACED FRACTURE OF LATERAL MALLEOLUS OF RIGHT FIBULA WITH ROUTINE HEALING, SUBSEQUENT ENCOUNTER: ICD-10-CM

## 2020-12-28 DIAGNOSIS — M21.611 BUNION, RIGHT: ICD-10-CM

## 2020-12-28 DIAGNOSIS — S92.412D CLOSED DISPLACED FRACTURE OF PROXIMAL PHALANX OF LEFT GREAT TOE WITH ROUTINE HEALING, SUBSEQUENT ENCOUNTER: ICD-10-CM

## 2020-12-28 DIAGNOSIS — M46.1 BILATERAL SACROILIITIS (HCC): ICD-10-CM

## 2020-12-28 DIAGNOSIS — G89.4 CHRONIC PAIN SYNDROME: ICD-10-CM

## 2020-12-28 DIAGNOSIS — S82.831D CLOSED FRACTURE OF DISTAL END OF RIGHT FIBULA WITH ROUTINE HEALING, UNSPECIFIED FRACTURE MORPHOLOGY, SUBSEQUENT ENCOUNTER: ICD-10-CM

## 2020-12-28 PROCEDURE — 97162 PT EVAL MOD COMPLEX 30 MIN: CPT | Performed by: PHYSICAL THERAPIST

## 2020-12-28 RX ORDER — TRAMADOL HYDROCHLORIDE 50 MG/1
TABLET ORAL
Qty: 120 TABLET | Refills: 0 | Status: SHIPPED | OUTPATIENT
Start: 2020-12-28 | End: 2021-01-25 | Stop reason: SDUPTHER

## 2020-12-28 RX ORDER — DULOXETIN HYDROCHLORIDE 20 MG/1
40 CAPSULE, DELAYED RELEASE ORAL DAILY
Qty: 60 CAPSULE | Refills: 3 | Status: SHIPPED | OUTPATIENT
Start: 2020-12-28 | End: 2021-01-13 | Stop reason: ALTCHOICE

## 2020-12-29 ENCOUNTER — TELEPHONE (OUTPATIENT)
Dept: FAMILY MEDICINE CLINIC | Facility: HOSPITAL | Age: 60
End: 2020-12-29

## 2020-12-29 ENCOUNTER — HOSPITAL ENCOUNTER (EMERGENCY)
Facility: HOSPITAL | Age: 60
Discharge: HOME | End: 2020-12-29
Attending: EMERGENCY MEDICINE
Payer: MEDICARE

## 2020-12-29 VITALS
DIASTOLIC BLOOD PRESSURE: 63 MMHG | RESPIRATION RATE: 24 BRPM | TEMPERATURE: 99.3 F | OXYGEN SATURATION: 93 % | SYSTOLIC BLOOD PRESSURE: 162 MMHG | HEART RATE: 77 BPM

## 2020-12-29 DIAGNOSIS — I16.0 HYPERTENSIVE URGENCY: ICD-10-CM

## 2020-12-29 DIAGNOSIS — R51.9 HEADACHE: Primary | ICD-10-CM

## 2020-12-29 LAB
ALBUMIN SERPL BCP-MCNC: 3.8 G/DL (ref 3.5–5)
ALP SERPL-CCNC: 327 U/L (ref 46–116)
ALT SERPL W P-5'-P-CCNC: 28 U/L (ref 12–78)
ANION GAP SERPL CALCULATED.3IONS-SCNC: 11 MMOL/L (ref 4–13)
APTT PPP: 28 SECONDS (ref 23–37)
AST SERPL W P-5'-P-CCNC: 18 U/L (ref 5–45)
BASOPHILS # BLD AUTO: 0.07 THOUSANDS/ΜL (ref 0–0.1)
BASOPHILS NFR BLD AUTO: 1 % (ref 0–1)
BILIRUB SERPL-MCNC: 0.2 MG/DL (ref 0.2–1)
BUN SERPL-MCNC: 39 MG/DL (ref 5–25)
CALCIUM SERPL-MCNC: 9.2 MG/DL (ref 8.3–10.1)
CHLORIDE SERPL-SCNC: 106 MMOL/L (ref 100–108)
CO2 SERPL-SCNC: 24 MMOL/L (ref 21–32)
CREAT SERPL-MCNC: 2.07 MG/DL (ref 0.6–1.3)
EOSINOPHIL # BLD AUTO: 0.45 THOUSAND/ΜL (ref 0–0.61)
EOSINOPHIL NFR BLD AUTO: 7 % (ref 0–6)
ERYTHROCYTE [DISTWIDTH] IN BLOOD BY AUTOMATED COUNT: 13.6 % (ref 11.6–15.1)
GFR SERPL CREATININE-BSD FRML MDRD: 26 ML/MIN/1.73SQ M
GLUCOSE SERPL-MCNC: 101 MG/DL (ref 65–140)
HCT VFR BLD AUTO: 39 % (ref 34.8–46.1)
HGB BLD-MCNC: 12.1 G/DL (ref 11.5–15.4)
IMM GRANULOCYTES # BLD AUTO: 0.02 THOUSAND/UL (ref 0–0.2)
IMM GRANULOCYTES NFR BLD AUTO: 0 % (ref 0–2)
INR PPP: 0.97 (ref 0.84–1.19)
LYMPHOCYTES # BLD AUTO: 1.13 THOUSANDS/ΜL (ref 0.6–4.47)
LYMPHOCYTES NFR BLD AUTO: 18 % (ref 14–44)
MCH RBC QN AUTO: 31.4 PG (ref 26.8–34.3)
MCHC RBC AUTO-ENTMCNC: 31 G/DL (ref 31.4–37.4)
MCV RBC AUTO: 101 FL (ref 82–98)
MONOCYTES # BLD AUTO: 0.63 THOUSAND/ΜL (ref 0.17–1.22)
MONOCYTES NFR BLD AUTO: 10 % (ref 4–12)
NEUTROPHILS # BLD AUTO: 3.92 THOUSANDS/ΜL (ref 1.85–7.62)
NEUTS SEG NFR BLD AUTO: 64 % (ref 43–75)
NRBC BLD AUTO-RTO: 0 /100 WBCS
PLATELET # BLD AUTO: 223 THOUSANDS/UL (ref 149–390)
PMV BLD AUTO: 10.5 FL (ref 8.9–12.7)
POTASSIUM SERPL-SCNC: 5 MMOL/L (ref 3.5–5.3)
PROT SERPL-MCNC: 7.3 G/DL (ref 6.4–8.2)
PROTHROMBIN TIME: 12.9 SECONDS (ref 11.6–14.5)
RBC # BLD AUTO: 3.85 MILLION/UL (ref 3.81–5.12)
SODIUM SERPL-SCNC: 141 MMOL/L (ref 136–145)
TROPONIN I SERPL-MCNC: <0.02 NG/ML
WBC # BLD AUTO: 6.22 THOUSAND/UL (ref 4.31–10.16)

## 2020-12-29 PROCEDURE — U0003 INFECTIOUS AGENT DETECTION BY NUCLEIC ACID (DNA OR RNA); SEVERE ACUTE RESPIRATORY SYNDROME CORONAVIRUS 2 (SARS-COV-2) (CORONAVIRUS DISEASE [COVID-19]), AMPLIFIED PROBE TECHNIQUE, MAKING USE OF HIGH THROUGHPUT TECHNOLOGIES AS DESCRIBED BY CMS-2020-01-R: HCPCS | Performed by: EMERGENCY MEDICINE

## 2020-12-29 PROCEDURE — 96365 THER/PROPH/DIAG IV INF INIT: CPT

## 2020-12-29 PROCEDURE — 96375 TX/PRO/DX INJ NEW DRUG ADDON: CPT

## 2020-12-29 PROCEDURE — 93005 ELECTROCARDIOGRAM TRACING: CPT

## 2020-12-29 PROCEDURE — 84484 ASSAY OF TROPONIN QUANT: CPT | Performed by: EMERGENCY MEDICINE

## 2020-12-29 PROCEDURE — 36415 COLL VENOUS BLD VENIPUNCTURE: CPT | Performed by: EMERGENCY MEDICINE

## 2020-12-29 PROCEDURE — 85730 THROMBOPLASTIN TIME PARTIAL: CPT | Performed by: EMERGENCY MEDICINE

## 2020-12-29 PROCEDURE — 80053 COMPREHEN METABOLIC PANEL: CPT | Performed by: EMERGENCY MEDICINE

## 2020-12-29 PROCEDURE — 99284 EMERGENCY DEPT VISIT MOD MDM: CPT

## 2020-12-29 PROCEDURE — 96361 HYDRATE IV INFUSION ADD-ON: CPT

## 2020-12-29 PROCEDURE — 99285 EMERGENCY DEPT VISIT HI MDM: CPT | Performed by: EMERGENCY MEDICINE

## 2020-12-29 PROCEDURE — 85610 PROTHROMBIN TIME: CPT | Performed by: EMERGENCY MEDICINE

## 2020-12-29 PROCEDURE — 85025 COMPLETE CBC W/AUTO DIFF WBC: CPT | Performed by: EMERGENCY MEDICINE

## 2020-12-29 RX ORDER — METOPROLOL TARTRATE 5 MG/5ML
10 INJECTION INTRAVENOUS ONCE
Status: COMPLETED | OUTPATIENT
Start: 2020-12-29 | End: 2020-12-29

## 2020-12-29 RX ORDER — MAGNESIUM SULFATE HEPTAHYDRATE 40 MG/ML
2 INJECTION, SOLUTION INTRAVENOUS ONCE
Status: COMPLETED | OUTPATIENT
Start: 2020-12-29 | End: 2020-12-29

## 2020-12-29 RX ORDER — DIPHENHYDRAMINE HYDROCHLORIDE 50 MG/ML
25 INJECTION INTRAMUSCULAR; INTRAVENOUS ONCE
Status: COMPLETED | OUTPATIENT
Start: 2020-12-29 | End: 2020-12-29

## 2020-12-29 RX ORDER — CARVEDILOL 3.12 MG/1
3.12 TABLET ORAL ONCE
Status: COMPLETED | OUTPATIENT
Start: 2020-12-29 | End: 2020-12-29

## 2020-12-29 RX ORDER — LORAZEPAM 2 MG/ML
1 INJECTION INTRAMUSCULAR ONCE
Status: COMPLETED | OUTPATIENT
Start: 2020-12-29 | End: 2020-12-29

## 2020-12-29 RX ORDER — METOCLOPRAMIDE HYDROCHLORIDE 5 MG/ML
10 INJECTION INTRAMUSCULAR; INTRAVENOUS ONCE
Status: COMPLETED | OUTPATIENT
Start: 2020-12-29 | End: 2020-12-29

## 2020-12-29 RX ORDER — AMILORIDE HYDROCHLORIDE 5 MG/1
5 TABLET ORAL ONCE
Status: COMPLETED | OUTPATIENT
Start: 2020-12-29 | End: 2020-12-29

## 2020-12-29 RX ORDER — METOPROLOL TARTRATE 5 MG/5ML
10 INJECTION INTRAVENOUS ONCE
Status: DISCONTINUED | OUTPATIENT
Start: 2020-12-29 | End: 2020-12-29

## 2020-12-29 RX ADMIN — DIPHENHYDRAMINE HYDROCHLORIDE 25 MG: 50 INJECTION, SOLUTION INTRAMUSCULAR; INTRAVENOUS at 17:01

## 2020-12-29 RX ADMIN — MAGNESIUM SULFATE HEPTAHYDRATE 2 G: 40 INJECTION, SOLUTION INTRAVENOUS at 19:33

## 2020-12-29 RX ADMIN — METOPROLOL TARTRATE 10 MG: 1 INJECTION, SOLUTION INTRAVENOUS at 19:57

## 2020-12-29 RX ADMIN — METOCLOPRAMIDE HYDROCHLORIDE 10 MG: 5 INJECTION INTRAMUSCULAR; INTRAVENOUS at 17:00

## 2020-12-29 RX ADMIN — AMILORIDE HYDROCLORIDE 5 MG: 5 TABLET ORAL at 19:45

## 2020-12-29 RX ADMIN — SODIUM CHLORIDE 500 ML: 0.9 INJECTION, SOLUTION INTRAVENOUS at 17:00

## 2020-12-29 RX ADMIN — CARVEDILOL 3.12 MG: 3.12 TABLET, FILM COATED ORAL at 19:45

## 2020-12-29 RX ADMIN — LORAZEPAM 1 MG: 2 INJECTION INTRAMUSCULAR; INTRAVENOUS at 18:39

## 2020-12-29 NOTE — TELEPHONE ENCOUNTER
I was set to call the patient and see that she is in the ER currently at [facility]    It appears they are doing a COVID test as well

## 2020-12-29 NOTE — TELEPHONE ENCOUNTER
Pt called  She has had a constant headache since Thursday  Her BP today is 198/101 and 190/100  Today is the first day she has taken her BP  She has been taking her BP meds as directed  She has now started with diarrhea  Her daughter-in-law's parents are COVID positive  Patient was with her daughter-in-law on [place] day

## 2020-12-30 ENCOUNTER — TELEPHONE (OUTPATIENT)
Dept: NEPHROLOGY | Facility: CLINIC | Age: 60
End: 2020-12-30

## 2020-12-30 ENCOUNTER — APPOINTMENT (OUTPATIENT)
Dept: PHYSICAL THERAPY | Facility: CLINIC | Age: 60
End: 2020-12-30
Payer: MEDICARE

## 2020-12-30 DIAGNOSIS — N25.81 SECONDARY RENAL HYPERPARATHYROIDISM (HCC): ICD-10-CM

## 2020-12-30 DIAGNOSIS — N18.30 CHRONIC KIDNEY DISEASE, STAGE 3 (HCC): ICD-10-CM

## 2020-12-30 DIAGNOSIS — E23.2 DIABETES INSIPIDUS (HCC): ICD-10-CM

## 2020-12-30 DIAGNOSIS — N28.1 RENAL CYST: ICD-10-CM

## 2020-12-30 DIAGNOSIS — I10 ESSENTIAL HYPERTENSION: ICD-10-CM

## 2020-12-30 LAB
ATRIAL RATE: 101 BPM
P AXIS: 68 DEGREES
PR INTERVAL: 138 MS
QRS AXIS: 134 DEGREES
QRSD INTERVAL: 106 MS
QT INTERVAL: 378 MS
QTC INTERVAL: 490 MS
SARS-COV-2 RNA SPEC QL NAA+PROBE: NOT DETECTED
T WAVE AXIS: 44 DEGREES
VENTRICULAR RATE: 101 BPM

## 2020-12-30 PROCEDURE — 93010 ELECTROCARDIOGRAM REPORT: CPT | Performed by: INTERNAL MEDICINE

## 2020-12-30 RX ORDER — CARVEDILOL 3.12 MG/1
6.25 TABLET ORAL 2 TIMES DAILY WITH MEALS
Qty: 180 TABLET | Refills: 0
Start: 2020-12-30 | End: 2021-02-08 | Stop reason: SDUPTHER

## 2020-12-30 NOTE — ED PROVIDER NOTES
History  Chief Complaint   Patient presents with    High Blood Pressure     pt has had a headache for about a week so today she took her blood pressure and it was 180/98  pt has left sided head ache radiating down neck  pt denies blurred vision  pt has been taking meds approrpraitely     Patient complains of sudden left side headache radiating to left neck 4 days ago  Tried tylenol without relief  Noticed BP high today  Doesn't get these headaches often  Did have argument with son at the time and admits to eating more salty food over holiday  Denies numbness, weakness, blurred vision  Admits to some sensitivity toward light  Prior to Admission Medications   Prescriptions Last Dose Informant Patient Reported? Taking?    AMILoride 5 mg tablet 2020 at Unknown time  No Yes   Sig: Take 1 tablet by mouth twice daily   DULoxetine (CYMBALTA) 20 mg capsule Not Taking at Unknown time  No No   Sig: Take 2 capsules (40 mg total) by mouth daily   Patient not taking: Reported on 2020   HYDROcodone-acetaminophen (NORCO) 5-325 mg per tablet 2020 at Unknown time  No Yes   Sig: Take 1 tablet by mouth every 6 (six) hours as needed for painMax Daily Amount: 4 tablets   Polyethylene Glycol 3350 (MIRALAX PO) 2020 at Unknown time Self Yes Yes   Sig: Take by mouth as needed    QUEtiapine (SEROQUEL) 300 mg tablet 2020 at Unknown time Self No Yes   Sig: Take 1 tablet (300 mg total) by mouth every morning 1 in the AM     ( also takes 400 mg at bedtime)   QUEtiapine (SEROquel) 100 mg tablet 2020 at Unknown time Self Yes Yes   Sig: Take 1 tablet by mouth daily at bedtime 1 at bedtime in addition to 400 mg tab   QUEtiapine (SEROquel) 400 MG tablet 2020 at Unknown time Self Yes Yes   Si at bedtime     (also takes 100 mg and 300 mg tabs)   acetaminophen (TYLENOL) 325 mg tablet 2020 at Unknown time Self Yes Yes   Sig: Take 650 mg by mouth every 6 (six) hours as needed for mild pain aspirin (ECOTRIN LOW STRENGTH) 81 mg EC tablet 12/29/2020 at Unknown time Self No Yes   Sig: Take 1 tablet (81 mg total) by mouth daily   atorvastatin (LIPITOR) 40 mg tablet 12/29/2020 at Unknown time Self No Yes   Sig: Take 1 tablet (40 mg total) by mouth daily   budesonide-formoterol (SYMBICORT) 160-4 5 mcg/act inhaler Not Taking at Unknown time Self No No   Sig: Inhale 2 puffs 2 (two) times a day Rinse mouth after use  Patient not taking: Reported on 12/29/2020   carvedilol (COREG) 3 125 mg tablet 12/29/2020 at Unknown time Self No Yes   Sig: Take 1 tablet (3 125 mg total) by mouth 2 (two) times a day with meals   clonazePAM (KlonoPIN) 0 5 mg tablet 12/29/2020 at Unknown time  No Yes   Sig: Take 1 tablet (0 5 mg total) by mouth 3 (three) times a day   fluocinonide (LIDEX) 0 05 % ointment 12/29/2020 at Unknown time Self No Yes   Sig: Apply topically 2 (two) times a day   fluvoxaMINE (LUVOX) 100 mg tablet 12/29/2020 at Unknown time Self Yes Yes   Sig: 3 at bedtime   lamoTRIgine (LaMICtal) 200 MG tablet 12/29/2020 at Unknown time Self Yes Yes   Sig: Take 400 mg by mouth daily at bedtime    linaCLOtide (Linzess) 290 MCG CAPS 12/29/2020 at Unknown time  No Yes   Sig: Take 1 capsule by mouth daily   naloxone (NARCAN) 4 mg/0 1 mL nasal spray 12/29/2020 at Unknown time Self No Yes   Sig: Administer 1 spray into a nostril  If breathing does not return to normal or if breathing difficulty resumes after 2-3 minutes, give another dose in the other nostril using a new spray     omeprazole (PriLOSEC) 20 mg delayed release capsule 12/29/2020 at Unknown time Self No Yes   Sig: Take 1 capsule (20 mg total) by mouth daily at bedtime   ondansetron (ZOFRAN-ODT) 4 mg disintegrating tablet 12/29/2020 at Unknown time  No Yes   Sig: Dissolve 1 tablet in mouth every 8 hours prn nausea and vomiting   traMADol (ULTRAM) 50 mg tablet 12/29/2020 at Unknown time  No Yes   Sig: Take 2 tabs by mouth twice daily      Facility-Administered Medications: None       Past Medical History:   Diagnosis Date    Ankle sprain     left    Anxiety disorder     Bipolar 2 disorder (HCC)     Chronic back pain     CKD (chronic kidney disease) stage 3, GFR 30-59 ml/min     stage 4 (as per pt)    CVA (cerebral vascular accident) ([facility] [address] )     noted on MRI in the past    Depression     GERD (gastroesophageal reflux disease)     Hypercholesteremia     Hyperlipidemia     Hypernatremia     Hypertension     Hypokalemia     Intervertebral disc disorder with radiculopathy of lumbosacral region     resolved: 2015    Kidney disease     Panic attacks     Pericardial effusion     PONV (postoperative nausea and vomiting)     Radiculitis     resolved: 2015    Secondary renal hyperparathyroidism ([facility] [address] )     Vitamin D deficiency        Past Surgical History:   Procedure Laterality Date    BUNIONECTOMY      Left foot     COLON SURGERY      COLONOSCOPY  2018    DILATION AND CURETTAGE OF UTERUS      INDUCED       surgically induced    VA ANASTOMOSIS,AV,ANY SITE Left 2019    Procedure: CREATION FISTULA ARTERIOVENOUS (AV) left wrists possible left upper;  Surgeon: Vicky Easton MD;  [facility];  Service: Vascular    VA CORRJ HALLUX VALGUS W/SESMDC W/DIST METAR OSTEOT Left 2019    Procedure: Olga Chandana;  Surgeon: Lydia Montes DPM;  [facility];  Service: Podiatry    VA [place] W/SESMDC W/DIST Dorma Galley Right 8/3/2020    Procedure: Ezra Rudolph;  Surgeon: Lydia Montes DPM;  [facility];  Service: Podiatry    VA ERCP DX COLLECTION SPECIMEN BRUSHING/WASHING N/A 2018    Procedure: ENDOSCOPIC RETROGRADE CHOLANGIOPANCREATOGRAPHY (ERCP);   Surgeon: Clayton Berg MD;  [facility];  Service: Gastroenterology    VA LAP, SURG PROCTOPEXY N/A 2018    Procedure: ROBOTIC SIGMOID RESECTION / RECTOPEXY;  Surgeon: Kirit Avalos MD;  [facility];  Service: Colorectal    [place] SIGMOIDOSCOPY FLX DX W/COLLJ SPEC BR/WA IF PFRMD N/A 7/13/2018    Procedure: Omar Jihan;  Surgeon: Dennis Cook MD;  [facility];  Service: Colorectal    TUBAL LIGATION Bilateral [facility] THYROID BIOPSY  7/30/2019       Family History   Problem Relation Age of Onset    Bipolar disorder Mother     Mental illness Mother         depression    Stroke Mother     Dementia Mother     Colon polyps Mother     Heart disease Father     Hypertension Father     Diabetes Father     Other Family         Back disorder    Diabetes Family     Heart disease Family     Hypertension Family     Breast cancer Family     Stroke Family     Thyroid disease Family     Breast cancer Paternal Grandmother     Breast cancer Paternal [de-identified]     Breast cancer Maternal Aunt     Mental illness Sister     Colon polyps Sister     Mental illness Sister     Heart disease Sister     No Known Problems Sister     No Known Problems Sister     Other Son         pituitary tumor    Hypertension Son     Obesity Son     No Known Problems Son     Substance Abuse Neg Hx         neg fam hx    Colon cancer Neg Hx      I have reviewed and agree with the history as documented  E-Cigarette/Vaping    E-Cigarette Use Never User      E-Cigarette/Vaping Substances    Nicotine No     THC No     CBD No     Flavoring No     Other No     Unknown No      Social History     Tobacco Use    Smoking status: Never Smoker    Smokeless tobacco: Never Used   Substance Use Topics    Alcohol use: Never     Frequency: Never     Binge frequency: Never    Drug use: Not Currently     Types: Marijuana       Review of Systems   Constitutional: Negative for chills and fever  HENT: Positive for ear pain  Negative for congestion and sore throat  Eyes: Negative for pain and visual disturbance  Respiratory: Negative for cough and shortness of breath      Cardiovascular: Negative for chest pain and palpitations  Gastrointestinal: Negative for abdominal pain and vomiting  Genitourinary: Negative for dysuria and hematuria  Musculoskeletal: Negative for arthralgias and back pain  Skin: Negative for color change and rash  Neurological: Negative for dizziness, seizures, syncope, facial asymmetry and weakness  Psychiatric/Behavioral: The patient is nervous/anxious  All other systems reviewed and are negative  Physical Exam  Physical Exam  Vitals signs and nursing note reviewed  Constitutional:       General: She is not in acute distress  Appearance: She is well-developed  HENT:      Head: Normocephalic and atraumatic  Right Ear: Tympanic membrane normal       Left Ear: There is impacted cerumen  Nose: No rhinorrhea  Eyes:      Extraocular Movements: Extraocular movements intact  Conjunctiva/sclera: Conjunctivae normal       Pupils: Pupils are equal, round, and reactive to light  Neck:      Musculoskeletal: Neck supple  Cardiovascular:      Rate and Rhythm: Normal rate and regular rhythm  Heart sounds: No murmur  Pulmonary:      Effort: Pulmonary effort is normal  No respiratory distress  Breath sounds: Normal breath sounds  Abdominal:      Palpations: Abdomen is soft  Tenderness: There is no abdominal tenderness  Musculoskeletal: Normal range of motion  Skin:     General: Skin is warm and dry  Neurological:      General: No focal deficit present  Mental Status: She is alert and oriented to person, place, and time  Mental status is at baseline  Cranial Nerves: No cranial nerve deficit  Sensory: No sensory deficit  Motor: No weakness  Coordination: Coordination normal       Gait: Gait normal    Psychiatric:         Mood and Affect: Mood is anxious           Vital Signs  ED Triage Vitals   Temperature Pulse Respirations Blood Pressure SpO2   12/29/20 1606 12/29/20 1606 12/29/20 1606 12/29/20 1606 12/29/20 1607   99 3 °F (37 4 °C) 103 18 (!) 201/95 98 %      Temp Source Heart Rate Source Patient Position - Orthostatic VS BP Location FiO2 (%)   12/29/20 1606 12/29/20 1606 12/29/20 1800 12/29/20 1800 --   Temporal Monitor Lying Right arm       Pain Score       12/29/20 1630       7           Vitals:    12/29/20 1900 12/29/20 1930 12/29/20 2000 12/29/20 2015   BP: (!) 197/87 (!) 183/84 (!) 182/81 162/63   Pulse: 101 100 79 77   Patient Position - Orthostatic VS:             Visual Acuity      ED Medications  Medications   metoclopramide (REGLAN) injection 10 mg (10 mg Intravenous Given 12/29/20 1700)   diphenhydrAMINE (BENADRYL) injection 25 mg (25 mg Intravenous Given 12/29/20 1701)   sodium chloride 0 9 % bolus 500 mL (0 mL Intravenous Stopped 12/29/20 1936)   LORazepam (ATIVAN) injection 1 mg (1 mg Intravenous Given 12/29/20 1839)   magnesium sulfate 2 g/50 mL IVPB (premix) 2 g (0 g Intravenous Stopped 12/29/20 2040)   AMILoride tablet 5 mg (5 mg Oral Given 12/29/20 1945)   carvedilol (COREG) tablet 3 125 mg (3 125 mg Oral Given 12/29/20 1945)   metoprolol (LOPRESSOR) injection 10 mg (10 mg Intravenous Given 12/29/20 1957)       Diagnostic Studies  Results Reviewed     Procedure Component Value Units Date/Time    Protime-INR [[id number]]  (Normal) Collected: 12/29/20 1630    Lab Status: Final result Specimen: Blood from Arm, Right Updated: 12/29/20 1701     Protime 12 9 seconds      INR 0 97    APTT [[id number]]  (Normal) Collected: 12/29/20 1630    Lab Status: Final result Specimen: Blood from Arm, Right Updated: 12/29/20 1701     PTT 28 seconds     Troponin I [[id number]]  (Normal) Collected: 12/29/20 1630    Lab Status: Final result Specimen: Blood from Arm, Right Updated: 12/29/20 1659     Troponin I <0 02 ng/mL     Comprehensive metabolic panel [[id number]]  (Abnormal) Collected: 12/29/20 1630    Lab Status: Final result Specimen: Blood from Arm, Right Updated: 12/29/20 1656     Sodium 141 mmol/L      Potassium 5 0 mmol/L Chloride 106 mmol/L      CO2 24 mmol/L      ANION GAP 11 mmol/L      BUN 39 mg/dL      Creatinine 2 07 mg/dL      Glucose 101 mg/dL      Calcium 9 2 mg/dL      AST 18 U/L      ALT 28 U/L      Alkaline Phosphatase 327 U/L      Total Protein 7 3 g/dL      Albumin 3 8 g/dL      Total Bilirubin 0 20 mg/dL      eGFR 26 ml/min/1 73sq m     Narrative:      [place] guidelines for Chronic Kidney Disease (CKD):     Stage 1 with normal or high GFR (GFR > 90 mL/min/1 73 square meters)    Stage 2 Mild CKD (GFR = 60-89 mL/min/1 73 square meters)    Stage 3A Moderate CKD (GFR = 45-59 mL/min/1 73 square meters)    Stage 3B Moderate CKD (GFR = 30-44 mL/min/1 73 square meters)    Stage 4 Severe CKD (GFR = 15-29 mL/min/1 73 square meters)    Stage 5 End Stage CKD (GFR <15 mL/min/1 73 square meters)  Note: GFR calculation is accurate only with a steady state creatinine    Novel Coronavirus (COVID-19), PCR [facility] [[id number]] Collected: 12/29/20 1642    Lab Status:  In process Specimen: Nasopharyngeal Swab Updated: 12/29/20 1642    CBC and differential [[id number]]  (Abnormal) Collected: 12/29/20 1630    Lab Status: Final result Specimen: Blood from Arm, Right Updated: 12/29/20 1639     WBC 6 22 Thousand/uL      RBC 3 85 Million/uL      Hemoglobin 12 1 g/dL      Hematocrit 39 0 %       fL      MCH 31 4 pg      MCHC 31 0 g/dL      RDW 13 6 %      MPV 10 5 fL      Platelets 414 Thousands/uL      nRBC 0 /100 WBCs      Neutrophils Relative 64 %      Immat GRANS % 0 %      Lymphocytes Relative 18 %      Monocytes Relative 10 %      Eosinophils Relative 7 %      Basophils Relative 1 %      Neutrophils Absolute 3 92 Thousands/µL      Immature Grans Absolute 0 02 Thousand/uL      Lymphocytes Absolute 1 13 Thousands/µL      Monocytes Absolute 0 63 Thousand/µL      Eosinophils Absolute 0 45 Thousand/µL      Basophils Absolute 0 07 Thousands/µL                  No orders to display              Procedures  ECG 12 Lead Documentation Only    Date/Time: 12/29/2020 5:08 PM  Performed by: Severiano Peguero DO  Authorized by: Severiano Peguero DO     Indications / Diagnosis:  Headache htn  ECG reviewed by me, the ED Provider: yes    Patient location:  ED  Previous ECG:     Previous ECG:  Compared to current    Comparison ECG info:  2-20    Similarity:  Changes noted  Interpretation:     Interpretation: non-specific    Rate:     ECG rate:  101    ECG rate assessment: tachycardic    Rhythm:     Rhythm: sinus tachycardia    Ectopy:     Ectopy: none    QRS:     QRS axis:  Normal    QRS intervals:  Normal  Conduction:     Conduction: normal    ST segments:     ST segments:  Normal  T waves:     T waves: normal               ED Course  ED Course as of Dec 29 2254   Tue Dec 29, 2020   1800 Still left side headache around her ear  1924 Still headache will give magnesium and her evening bp meds  1953 Still HTN - will try metoprolol, IV                                SBIRT 20yo+      Most Recent Value   SBIRT (24 yo +)   In order to provide better care to our patients, we are screening all of our patients for alcohol and drug use  Would it be okay to ask you these screening questions?   No Filed at: 12/29/2020 2022                    [facility]  Number of Diagnoses or Management Options  Headache: new and requires workup  Hypertensive urgency: new and requires workup     Amount and/or Complexity of Data Reviewed  Clinical lab tests: ordered and reviewed    Patient Progress  Patient progress: improved      Disposition  Final diagnoses:   Headache   Hypertensive urgency     Time reflects when diagnosis was documented in both MDM as applicable and the Disposition within this note     Time User Action Codes Description Comment    12/29/2020  8:30 PM [place] Add [R51 9] Headache     12/29/2020  8:30 PM [place] Add [I16 0] Hypertensive urgency       ED Disposition     ED Disposition Condition Date/Time Comment    Discharge Stable Tue Dec 29, 2020  8:30 PM Jazzy discharge to home/self care  Follow-up Information     Follow up With Specialties Details Why [address], [address] Nephrology Call in 1 day  [place] [address]  [address]  [facility]  [phone]            Discharge Medication List as of 12/29/2020  8:31 PM      CONTINUE these medications which have NOT CHANGED    Details   acetaminophen (TYLENOL) 325 mg tablet Take 650 mg by mouth every 6 (six) hours as needed for mild pain, Historical Med      AMILoride 5 mg tablet Take 1 tablet by mouth twice daily, Normal      aspirin (ECOTRIN LOW STRENGTH) 81 mg EC tablet Take 1 tablet (81 mg total) by mouth daily, Starting Wed 8/14/2019, No Print      atorvastatin (LIPITOR) 40 mg tablet Take 1 tablet (40 mg total) by mouth daily, Starting Tue 7/7/2020, Normal      carvedilol (COREG) 3 125 mg tablet Take 1 tablet (3 125 mg total) by mouth 2 (two) times a day with meals, Starting Thu 8/20/2020, Normal      clonazePAM (KlonoPIN) 0 5 mg tablet Take 1 tablet (0 5 mg total) by mouth 3 (three) times a day, Starting Wed 10/21/2020, Normal      fluocinonide (LIDEX) 0 05 % ointment Apply topically 2 (two) times a day, Starting Wed 3/18/2020, Print      fluvoxaMINE (LUVOX) 100 mg tablet 3 at bedtime, Starting Wed 10/10/2018, Historical Med      HYDROcodone-acetaminophen (NORCO) 5-325 mg per tablet Take 1 tablet by mouth every 6 (six) hours as needed for painMax Daily Amount: 4 tablets, Starting Fri 11/27/2020, Normal      lamoTRIgine (LaMICtal) 200 MG tablet Take 400 mg by mouth daily at bedtime , Starting Mon 4/13/2020, Historical Med      linaCLOtide (Linzess) 290 MCG CAPS Take 1 capsule by mouth daily, Starting Thu 12/3/2020, Until Wed 3/3/2021, Print      naloxone (NARCAN) 4 mg/0 1 mL nasal spray Administer 1 spray into a nostril   If breathing does not return to normal or if breathing difficulty resumes after 2-3 minutes, give another dose in the other nostril using a new spray , Normal      omeprazole (PriLOSEC) 20 mg delayed release capsule Take 1 capsule (20 mg total) by mouth daily at bedtime, Starting Thu 8/20/2020, Normal      ondansetron (ZOFRAN-ODT) 4 mg disintegrating tablet Dissolve 1 tablet in mouth every 8 hours prn nausea and vomiting, Normal      Polyethylene Glycol 3350 (MIRALAX PO) Take by mouth as needed , Historical Med      !! QUEtiapine (SEROquel) 100 mg tablet Take 1 tablet by mouth daily at bedtime 1 at bedtime in addition to 400 mg tab, Historical Med      !! QUEtiapine (SEROQUEL) 300 mg tablet Take 1 tablet (300 mg total) by mouth every morning 1 in the AM     ( also takes 400 mg at bedtime), Starting Wed 1/2/2019, Normal      !! QUEtiapine (SEROquel) 400 MG tablet 1 at bedtime     (also takes 100 mg and 300 mg tabs), Historical Med      traMADol (ULTRAM) 50 mg tablet Take 2 tabs by mouth twice daily, Normal      budesonide-formoterol (SYMBICORT) 160-4 5 mcg/act inhaler Inhale 2 puffs 2 (two) times a day Rinse mouth after use , Starting Tue 8/27/2019, Print      DULoxetine (CYMBALTA) 20 mg capsule Take 2 capsules (40 mg total) by mouth daily, Starting Mon 12/28/2020, Normal       !! - Potential duplicate medications found  Please discuss with provider  No discharge procedures on file      PDMP Review       Value Time User    PDMP Reviewed  Yes 12/28/2020  3:00 PM Valerie Romero DO          ED Provider  Electronically Signed by           Claudia Perez DO  12/29/20 5409

## 2020-12-31 ENCOUNTER — APPOINTMENT (OUTPATIENT)
Dept: PHYSICAL THERAPY | Facility: CLINIC | Age: 60
End: 2020-12-31
Payer: MEDICARE

## 2021-01-04 ENCOUNTER — TELEPHONE (OUTPATIENT)
Dept: GASTROENTEROLOGY | Facility: CLINIC | Age: 61
End: 2021-01-04

## 2021-01-04 NOTE — TELEPHONE ENCOUNTER
Pt left ALFREDO patelg stating returning your call canelo LARRY [phone]  Called Pt back  Advised that meds arrived today/she can  at   She said she needs to sched a colonoscopy  Transf'd call to scheduling

## 2021-01-05 ENCOUNTER — TELEPHONE (OUTPATIENT)
Dept: NEPHROLOGY | Facility: CLINIC | Age: 61
End: 2021-01-05

## 2021-01-05 ENCOUNTER — OFFICE VISIT (OUTPATIENT)
Dept: PHYSICAL THERAPY | Facility: CLINIC | Age: 61
End: 2021-01-05
Payer: MEDICARE

## 2021-01-05 DIAGNOSIS — S92.412D CLOSED DISPLACED FRACTURE OF PROXIMAL PHALANX OF LEFT GREAT TOE WITH ROUTINE HEALING, SUBSEQUENT ENCOUNTER: ICD-10-CM

## 2021-01-05 DIAGNOSIS — S82.64XD CLOSED NONDISPLACED FRACTURE OF LATERAL MALLEOLUS OF RIGHT FIBULA WITH ROUTINE HEALING, SUBSEQUENT ENCOUNTER: Primary | ICD-10-CM

## 2021-01-05 PROCEDURE — 97110 THERAPEUTIC EXERCISES: CPT

## 2021-01-05 PROCEDURE — 97112 NEUROMUSCULAR REEDUCATION: CPT

## 2021-01-05 NOTE — TELEPHONE ENCOUNTER
Patient called the office and left a vm asking for a call back with multiple questions  I attempted to call pt back and left a vm stating I was returning her phone call

## 2021-01-06 ENCOUNTER — OFFICE VISIT (OUTPATIENT)
Dept: FAMILY MEDICINE CLINIC | Facility: HOSPITAL | Age: 61
End: 2021-01-06
Payer: MEDICARE

## 2021-01-06 ENCOUNTER — TELEPHONE (OUTPATIENT)
Dept: FAMILY MEDICINE CLINIC | Facility: HOSPITAL | Age: 61
End: 2021-01-06

## 2021-01-06 VITALS — WEIGHT: 214 LBS | SYSTOLIC BLOOD PRESSURE: 140 MMHG | BODY MASS INDEX: 33.52 KG/M2 | DIASTOLIC BLOOD PRESSURE: 80 MMHG

## 2021-01-06 DIAGNOSIS — H61.23 BILATERAL IMPACTED CERUMEN: Primary | ICD-10-CM

## 2021-01-06 PROCEDURE — 69209 REMOVE IMPACTED EAR WAX UNI: CPT | Performed by: PHYSICIAN ASSISTANT

## 2021-01-06 PROCEDURE — 99213 OFFICE O/P EST LOW 20 MIN: CPT | Performed by: PHYSICIAN ASSISTANT

## 2021-01-06 NOTE — PROGRESS NOTES
Assessment/Plan:           Problem List Items Addressed This Visit     None        Impacted cerumen-  Ears flushed bilaterally, recommend regular use of   Debrox  Subjective:      Patient ID: Ayleen Grayson is a 64 y o  female  Presents to get ears flushed  Review of Systems   Constitutional: Negative for chills, diaphoresis, fatigue and fever  HENT: Positive for hearing loss  Negative for congestion, ear pain, rhinorrhea and sore throat  Respiratory: Negative for cough, chest tightness and shortness of breath  Gastrointestinal: Negative for abdominal pain, nausea and vomiting  Neurological: Positive for dizziness, light-headedness and headaches  Objective:      /80 (BP Location: Right arm, Patient Position: Sitting, Cuff Size: Standard)   Wt 97 1 kg (214 lb)   BMI 33 52 kg/m²          Physical Exam  Vitals signs and nursing note reviewed  Constitutional:       General: She is not in acute distress  Appearance: Normal appearance  She is not ill-appearing, toxic-appearing or diaphoretic  HENT:      Head: Normocephalic and atraumatic  Right Ear: There is impacted cerumen  Left Ear: There is impacted cerumen  Neurological:      Mental Status: She is alert

## 2021-01-06 NOTE — PROGRESS NOTES
Ear cerumen removal    Date/Time: 1/6/2021 9:52 AM  Performed by: Lachelle Garvin PA-C  Authorized by: Lachelle Garvin PA-C   Universal Protocol:  Consent: Verbal consent obtained  Consent given by: patient      Patient location:  Clinic  Procedure details:     Local anesthetic:  None    Location:  R ear and L ear    Procedure type: irrigation only      Approach:  Natural orifice  Post-procedure details:     Complication:  None    Hearing quality:  Normal    Patient tolerance of procedure:   Tolerated well, no immediate complications

## 2021-01-07 ENCOUNTER — APPOINTMENT (OUTPATIENT)
Dept: PHYSICAL THERAPY | Facility: CLINIC | Age: 61
End: 2021-01-07
Payer: MEDICARE

## 2021-01-08 ENCOUNTER — TELEPHONE (OUTPATIENT)
Dept: NEPHROLOGY | Facility: CLINIC | Age: 61
End: 2021-01-08

## 2021-01-08 ENCOUNTER — TELEPHONE (OUTPATIENT)
Dept: FAMILY MEDICINE CLINIC | Facility: HOSPITAL | Age: 61
End: 2021-01-08

## 2021-01-08 NOTE — TELEPHONE ENCOUNTER
Patient left a message on MA line  She stated that Dr Irene Sánchez increased her coreg  medication and she has been feeling sick and dizzy recently  She would like to know if this could be a side effect from the medication  Please contact her at your earliest convenience

## 2021-01-11 ENCOUNTER — HOSPITAL ENCOUNTER (EMERGENCY)
Facility: HOSPITAL | Age: 61
Discharge: HOME | End: 2021-01-11
Attending: EMERGENCY MEDICINE | Admitting: EMERGENCY MEDICINE
Payer: MEDICARE

## 2021-01-11 ENCOUNTER — APPOINTMENT (EMERGENCY)
Dept: RADIOLOGY | Facility: HOSPITAL | Age: 61
End: 2021-01-11
Payer: MEDICARE

## 2021-01-11 ENCOUNTER — APPOINTMENT (EMERGENCY)
Dept: CT IMAGING | Facility: HOSPITAL | Age: 61
End: 2021-01-11
Payer: MEDICARE

## 2021-01-11 VITALS
HEIGHT: 67 IN | RESPIRATION RATE: 20 BRPM | WEIGHT: 214 LBS | DIASTOLIC BLOOD PRESSURE: 74 MMHG | SYSTOLIC BLOOD PRESSURE: 136 MMHG | TEMPERATURE: 97.4 F | OXYGEN SATURATION: 94 % | HEART RATE: 81 BPM | BODY MASS INDEX: 33.59 KG/M2

## 2021-01-11 DIAGNOSIS — F31.81 BIPOLAR 2 DISORDER (HCC): Chronic | ICD-10-CM

## 2021-01-11 DIAGNOSIS — U07.1 COVID-19: Primary | ICD-10-CM

## 2021-01-11 DIAGNOSIS — E86.0 DEHYDRATION: ICD-10-CM

## 2021-01-11 DIAGNOSIS — R42 DIZZINESS: ICD-10-CM

## 2021-01-11 DIAGNOSIS — R11.2 NAUSEA VOMITING AND DIARRHEA: ICD-10-CM

## 2021-01-11 DIAGNOSIS — N18.9 CHRONIC RENAL INSUFFICIENCY: ICD-10-CM

## 2021-01-11 DIAGNOSIS — R19.7 NAUSEA VOMITING AND DIARRHEA: ICD-10-CM

## 2021-01-11 LAB
ALBUMIN SERPL BCP-MCNC: 3.4 G/DL (ref 3.5–5)
ALP SERPL-CCNC: 282 U/L (ref 46–116)
ALT SERPL W P-5'-P-CCNC: 36 U/L (ref 12–78)
ANION GAP SERPL CALCULATED.3IONS-SCNC: 10 MMOL/L (ref 4–13)
AST SERPL W P-5'-P-CCNC: 36 U/L (ref 5–45)
BASOPHILS # BLD AUTO: 0.04 THOUSANDS/ΜL (ref 0–0.1)
BASOPHILS NFR BLD AUTO: 1 % (ref 0–1)
BILIRUB SERPL-MCNC: 0.3 MG/DL (ref 0.2–1)
BUN SERPL-MCNC: 28 MG/DL (ref 5–25)
CALCIUM ALBUM COR SERPL-MCNC: 9.7 MG/DL (ref 8.3–10.1)
CALCIUM SERPL-MCNC: 9.2 MG/DL (ref 8.3–10.1)
CHLORIDE SERPL-SCNC: 99 MMOL/L (ref 100–108)
CO2 SERPL-SCNC: 24 MMOL/L (ref 21–32)
CREAT SERPL-MCNC: 2.53 MG/DL (ref 0.6–1.3)
EOSINOPHIL # BLD AUTO: 0.02 THOUSAND/ΜL (ref 0–0.61)
EOSINOPHIL NFR BLD AUTO: 0 % (ref 0–6)
ERYTHROCYTE [DISTWIDTH] IN BLOOD BY AUTOMATED COUNT: 13.4 % (ref 11.6–15.1)
FLUAV RNA RESP QL NAA+PROBE: NEGATIVE
FLUBV RNA RESP QL NAA+PROBE: NEGATIVE
GFR SERPL CREATININE-BSD FRML MDRD: 20 ML/MIN/1.73SQ M
GLUCOSE SERPL-MCNC: 103 MG/DL (ref 65–140)
HCT VFR BLD AUTO: 42.3 % (ref 34.8–46.1)
HGB BLD-MCNC: 13.2 G/DL (ref 11.5–15.4)
IMM GRANULOCYTES # BLD AUTO: 0.04 THOUSAND/UL (ref 0–0.2)
IMM GRANULOCYTES NFR BLD AUTO: 1 % (ref 0–2)
LYMPHOCYTES # BLD AUTO: 1.14 THOUSANDS/ΜL (ref 0.6–4.47)
LYMPHOCYTES NFR BLD AUTO: 18 % (ref 14–44)
MCH RBC QN AUTO: 31.4 PG (ref 26.8–34.3)
MCHC RBC AUTO-ENTMCNC: 31.2 G/DL (ref 31.4–37.4)
MCV RBC AUTO: 101 FL (ref 82–98)
MONOCYTES # BLD AUTO: 0.66 THOUSAND/ΜL (ref 0.17–1.22)
MONOCYTES NFR BLD AUTO: 10 % (ref 4–12)
NEUTROPHILS # BLD AUTO: 4.5 THOUSANDS/ΜL (ref 1.85–7.62)
NEUTS SEG NFR BLD AUTO: 70 % (ref 43–75)
NRBC BLD AUTO-RTO: 0 /100 WBCS
PLATELET # BLD AUTO: 261 THOUSANDS/UL (ref 149–390)
PMV BLD AUTO: 10.1 FL (ref 8.9–12.7)
POTASSIUM SERPL-SCNC: 4.6 MMOL/L (ref 3.5–5.3)
PROT SERPL-MCNC: 7.8 G/DL (ref 6.4–8.2)
RBC # BLD AUTO: 4.21 MILLION/UL (ref 3.81–5.12)
RSV RNA RESP QL NAA+PROBE: NEGATIVE
SARS-COV-2 RNA RESP QL NAA+PROBE: POSITIVE
SODIUM SERPL-SCNC: 133 MMOL/L (ref 136–145)
TROPONIN I SERPL-MCNC: <0.02 NG/ML
WBC # BLD AUTO: 6.4 THOUSAND/UL (ref 4.31–10.16)

## 2021-01-11 PROCEDURE — G1004 CDSM NDSC: HCPCS

## 2021-01-11 PROCEDURE — 96361 HYDRATE IV INFUSION ADD-ON: CPT

## 2021-01-11 PROCEDURE — 80053 COMPREHEN METABOLIC PANEL: CPT

## 2021-01-11 PROCEDURE — 96374 THER/PROPH/DIAG INJ IV PUSH: CPT

## 2021-01-11 PROCEDURE — 99285 EMERGENCY DEPT VISIT HI MDM: CPT | Performed by: EMERGENCY MEDICINE

## 2021-01-11 PROCEDURE — 36415 COLL VENOUS BLD VENIPUNCTURE: CPT

## 2021-01-11 PROCEDURE — 85025 COMPLETE CBC W/AUTO DIFF WBC: CPT

## 2021-01-11 PROCEDURE — 71250 CT THORAX DX C-: CPT

## 2021-01-11 PROCEDURE — 0241U HB NFCT DS VIR RESP RNA 4 TRGT: CPT | Performed by: EMERGENCY MEDICINE

## 2021-01-11 PROCEDURE — 74176 CT ABD & PELVIS W/O CONTRAST: CPT

## 2021-01-11 PROCEDURE — 71045 X-RAY EXAM CHEST 1 VIEW: CPT

## 2021-01-11 PROCEDURE — 99285 EMERGENCY DEPT VISIT HI MDM: CPT

## 2021-01-11 PROCEDURE — 84484 ASSAY OF TROPONIN QUANT: CPT

## 2021-01-11 PROCEDURE — 93005 ELECTROCARDIOGRAM TRACING: CPT

## 2021-01-11 RX ORDER — CLONAZEPAM 0.5 MG/1
0.5 TABLET ORAL 3 TIMES DAILY
Qty: 270 TABLET | Refills: 0 | Status: SHIPPED | OUTPATIENT
Start: 2021-01-11 | End: 2021-01-25 | Stop reason: SDUPTHER

## 2021-01-11 RX ORDER — ACETAMINOPHEN 325 MG/1
650 TABLET ORAL ONCE
Status: COMPLETED | OUTPATIENT
Start: 2021-01-11 | End: 2021-01-11

## 2021-01-11 RX ORDER — ONDANSETRON 2 MG/ML
4 INJECTION INTRAMUSCULAR; INTRAVENOUS ONCE
Status: COMPLETED | OUTPATIENT
Start: 2021-01-11 | End: 2021-01-11

## 2021-01-11 RX ADMIN — ACETAMINOPHEN 650 MG: 325 TABLET, FILM COATED ORAL at 14:44

## 2021-01-11 RX ADMIN — ONDANSETRON 4 MG: 2 INJECTION INTRAMUSCULAR; INTRAVENOUS at 14:43

## 2021-01-11 RX ADMIN — SODIUM CHLORIDE 1000 ML: 0.9 INJECTION, SOLUTION INTRAVENOUS at 15:28

## 2021-01-11 RX ADMIN — SODIUM CHLORIDE 1000 ML: 0.9 INJECTION, SOLUTION INTRAVENOUS at 14:43

## 2021-01-11 NOTE — TELEPHONE ENCOUNTER
Contacted pt, she did not answer  I did leave her a VM requesting that she contact the office  Pt is currently in the ED with suspected COVID

## 2021-01-11 NOTE — ED PROVIDER NOTES
History  Chief Complaint   Patient presents with    Dizziness     patient presents to the ED with c/o diahrrea and vomitting x1 week with dizzness      63-year-old female presents for evaluation of 1 week of nonbloody, nonbilious nausea, vomiting and diarrhea with associated dizziness  The patient states that she has not been able to keep anything down for past week  She also notes that she has had a loss of smell and taste  The patient denies any known COVID exposures or any significant shortness of breath cough  She does admit that she has had increasing fatigue  Nothing has made the symptoms better  Prior to Admission Medications   Prescriptions Last Dose Informant Patient Reported? Taking?    AMILoride 5 mg tablet   No No   Sig: Take 1 tablet by mouth twice daily   DULoxetine (CYMBALTA) 20 mg capsule   No No   Sig: Take 2 capsules (40 mg total) by mouth daily   Patient not taking: Reported on 2020   HYDROcodone-acetaminophen (NORCO) 5-325 mg per tablet   No No   Sig: Take 1 tablet by mouth every 6 (six) hours as needed for painMax Daily Amount: 4 tablets   Polyethylene Glycol 3350 (MIRALAX PO)  Self Yes No   Sig: Take by mouth as needed    QUEtiapine (SEROQUEL) 300 mg tablet  Self No No   Sig: Take 1 tablet (300 mg total) by mouth every morning 1 in the AM     ( also takes 400 mg at bedtime)   QUEtiapine (SEROquel) 100 mg tablet  Self Yes No   Sig: Take 1 tablet by mouth daily at bedtime 1 at bedtime in addition to 400 mg tab   QUEtiapine (SEROquel) 400 MG tablet  Self Yes No   Si at bedtime     (also takes 100 mg and 300 mg tabs)   acetaminophen (TYLENOL) 325 mg tablet  Self Yes No   Sig: Take 650 mg by mouth every 6 (six) hours as needed for mild pain   aspirin (ECOTRIN LOW STRENGTH) 81 mg EC tablet  Self No No   Sig: Take 1 tablet (81 mg total) by mouth daily   atorvastatin (LIPITOR) 40 mg tablet  Self No No   Sig: Take 1 tablet (40 mg total) by mouth daily   budesonide-formoterol (SYMBICORT) 160-4 5 mcg/act inhaler  Self No No   Sig: Inhale 2 puffs 2 (two) times a day Rinse mouth after use  Patient not taking: Reported on 12/29/2020   carvedilol (COREG) 3 125 mg tablet   No No   Sig: Take 2 tablets (6 25 mg total) by mouth 2 (two) times a day with meals   clonazePAM (KlonoPIN) 0 5 mg tablet   No No   Sig: Take 1 tablet (0 5 mg total) by mouth 3 (three) times a day   fluocinonide (LIDEX) 0 05 % ointment  Self No No   Sig: Apply topically 2 (two) times a day   fluvoxaMINE (LUVOX) 100 mg tablet  Self Yes No   Sig: 3 at bedtime   lamoTRIgine (LaMICtal) 200 MG tablet  Self Yes No   Sig: Take 400 mg by mouth daily at bedtime    linaCLOtide (Linzess) 290 MCG CAPS   No No   Sig: Take 1 capsule by mouth daily   naloxone (NARCAN) 4 mg/0 1 mL nasal spray  Self No No   Sig: Administer 1 spray into a nostril  If breathing does not return to normal or if breathing difficulty resumes after 2-3 minutes, give another dose in the other nostril using a new spray     omeprazole (PriLOSEC) 20 mg delayed release capsule  Self No No   Sig: Take 1 capsule (20 mg total) by mouth daily at bedtime   ondansetron (ZOFRAN-ODT) 4 mg disintegrating tablet   No No   Sig: Dissolve 1 tablet in mouth every 8 hours prn nausea and vomiting   traMADol (ULTRAM) 50 mg tablet   No No   Sig: Take 2 tabs by mouth twice daily      Facility-Administered Medications: None       Past Medical History:   Diagnosis Date    Ankle sprain     left    Anxiety disorder     Bipolar 2 disorder (HCC)     Chronic back pain     CKD (chronic kidney disease) stage 3, GFR 30-59 ml/min     stage 4 (as per pt)    CVA (cerebral vascular accident) ([address] )     noted on MRI in the past    Depression     GERD (gastroesophageal reflux disease)     Hypercholesteremia     Hyperlipidemia     Hypernatremia     Hypertension     Hypokalemia     Intervertebral disc disorder with radiculopathy of lumbosacral region     resolved: 12/28/2015    Kidney disease     Panic attacks     Pericardial effusion     PONV (postoperative nausea and vomiting)     Radiculitis     resolved: 2015    Secondary renal hyperpar[address]roidism [address] [address]     Vitamin D deficiency        Past Surgical History:   Procedure Laterality Date    BUNIONECTOMY      Left foot     COLON SURGERY      COLONOSCOPY  2018    DILATION AND CURETTAGE OF UTERUS      INDUCED       surgically induced    NJ ANASTOMOSIS,AV,ANY SITE Left 2019    Procedure: CREATION FISTULA ARTERIOVENOUS (AV) left wrists possible left upper;  Surgeon: Hillary Daigle MD;  [facility];  Service: Vascular    NJ CORRJ HALLUX VALGUS W/SESMDC W/DIST Izella Donny Left 2019    Procedure: Karissa Cabrera;  Surgeon: Severiano Nguyen DPM;  [facility];  Service: [address] W/SESMDC W/DIST Izella Donny Right 8/3/2020    Procedure: Bianca Barrett;  Surgeon: Severiano Nguyen DPM;  [facility];  Service: Podiatry    [place] ERCP DX COLLECTION SPECIMEN BRUSHING/WASHING N/A 2018    Procedure: ENDOSCOPIC RETROGRADE CHOLANGIOPANCREATOGRAPHY (ERCP);   Surgeon: Sana Gunn MD;  [facility];  Service: Gastroenterology    NJ LAP, SURG PROCTOPEXY N/A 2018    Procedure: ROBOTIC SIGMOID RESECTION / RECTOPEXY;  Surgeon: Dennis Cook MD;  [facility];  Service: Colorectal    NJ SIGMOIDOSCOPY FLX DX W/COLLJ SPEC BR/WA IF PFRMD N/A 2018    Procedure: Omar Jihan;  Surgeon: Dennis Cook MD;  [facility];  Service: Colorectal    TUBAL LIGATION Bilateral    [facility] 47 THYROID BIOPSY  2019       Family History   Problem Relation Age of Onset    Bipolar disorder Mother     Mental illness Mother         depression    Stroke Mother     Dementia Mother     Colon polyps Mother     Heart disease Father     Hypertension Father     Diabetes Father     Other Family         Back disorder    Diabetes Family     Heart disease Family     Hypertension Family     Breast cancer Family     Stroke Family     Thyroid disease Family     Breast cancer Paternal Grandmother     Breast cancer Paternal [de-identified]     Breast cancer Maternal Aunt     Mental illness Sister     Colon polyps Sister     Mental illness Sister     Heart disease Sister     No Known Problems Sister     No Known Problems Sister     Other Son         pituitary tumor    Hypertension Son     Obesity Son     No Known Problems Son     Substance Abuse Neg Hx         neg fam hx    Colon cancer Neg Hx      I have reviewed and agree with the history as documented  E-Cigarette/Vaping    E-Cigarette Use Never User      E-Cigarette/Vaping Substances    Nicotine No     THC No     CBD No     Flavoring No     Other No     Unknown No      Social History     Tobacco Use    Smoking status: Never Smoker    Smokeless tobacco: Never Used   Substance Use Topics    Alcohol use: Never     Frequency: Never     Binge frequency: Never    Drug use: Not Currently     Types: Marijuana       Review of Systems   Constitutional: Positive for fatigue  Negative for chills and fever  Respiratory: Negative for cough and shortness of breath  Gastrointestinal: Positive for diarrhea, nausea and vomiting  Negative for abdominal pain  All other systems reviewed and are negative  Physical Exam  Physical Exam  Vitals signs and nursing note reviewed  Constitutional:       General: She is not in acute distress  Appearance: She is well-developed  HENT:      Head: Normocephalic and atraumatic  Right Ear: External ear normal       Left Ear: External ear normal    Eyes:      General: No scleral icterus  Conjunctiva/sclera: Conjunctivae normal       Pupils: Pupils are equal, round, and reactive to light  Neck:      Musculoskeletal: Normal range of motion  Cardiovascular:      Rate and Rhythm: Normal rate and regular rhythm        Heart sounds: Normal heart sounds  Pulmonary:      Effort: Pulmonary effort is normal  No respiratory distress  Breath sounds: Normal breath sounds  Abdominal:      General: Bowel sounds are normal       Palpations: Abdomen is soft  Tenderness: There is no abdominal tenderness  There is no guarding or rebound  Musculoskeletal: Normal range of motion  Skin:     General: Skin is warm and dry  Findings: No rash  Neurological:      Mental Status: She is alert and oriented to person, place, and time           Vital Signs  ED Triage Vitals   Temperature Pulse Respirations Blood Pressure SpO2   01/11/21 1209 01/11/21 1209 01/11/21 1209 01/11/21 1209 01/11/21 1209   (!) 97 4 °F (36 3 °C) 104 20 121/72 95 %      Temp Source Heart Rate Source Patient Position - Orthostatic VS BP Location FiO2 (%)   01/11/21 1209 01/11/21 1209 01/11/21 1209 01/11/21 1209 --   Temporal Monitor Sitting Right arm       Pain Score       01/11/21 1444       7           Vitals:    01/11/21 1630 01/11/21 1645 01/11/21 1700 01/11/21 1800   BP: (!) 177/84  (!) 182/86 136/74   Pulse: 79 81 88 81   Patient Position - Orthostatic VS: Lying  Lying Lying         Visual Acuity  Visual Acuity      Most Recent Value   L Pupil Size (mm)  3   R Pupil Size (mm)  3          ED Medications  Medications   sodium chloride 0 9 % bolus 1,000 mL (0 mL Intravenous Stopped 1/11/21 1543)   ondansetron (ZOFRAN) injection 4 mg (4 mg Intravenous Given 1/11/21 1443)   acetaminophen (TYLENOL) tablet 650 mg (650 mg Oral Given 1/11/21 1444)   sodium chloride 0 9 % bolus 1,000 mL (0 mL Intravenous Stopped 1/11/21 1628)       Diagnostic Studies  Results Reviewed     Procedure Component Value Units Date/Time    COVID[facility], [id number]  (Abnormal) Collected: 01/11/21 1444    Lab Status: Final result Specimen: Nares from Nasopharyngeal Swab Updated: 01/11/21 5717     SARS-CoV-2 Positive     INFLUENZA A PCR Negative     INFLUENZA B PCR Negative RSV PCR Negative    Narrative: This test has been authorized by FDA under an EUA (Emergency Use Assay) for use by authorized laboratories  Clinical caution and judgement should be used with the interpretation of these results with consideration of the clinical impression and other laboratory testing  Testing reported as "Positive" or "Negative" has been proven to be accurate according to standard laboratory validation requirements  All testing is performed with control materials showing appropriate reactivity at standard intervals      Comprehensive metabolic panel [[id number]]  (Abnormal) Collected: 01/11/21 1424    Lab Status: Final result Specimen: Blood from Arm, Right Updated: 01/11/21 1503     Sodium 133 mmol/L      Potassium 4 6 mmol/L      Chloride 99 mmol/L      CO2 24 mmol/L      ANION GAP 10 mmol/L      BUN 28 mg/dL      Creatinine 2 53 mg/dL      Glucose 103 mg/dL      Calcium 9 2 mg/dL      Corrected Calcium 9 7 mg/dL      AST 36 U/L      ALT 36 U/L      Alkaline Phosphatase 282 U/L      Total Protein 7 8 g/dL      Albumin 3 4 g/dL      Total Bilirubin 0 30 mg/dL      eGFR 20 ml/min/1 73sq m     Narrative:      [place] guidelines for Chronic Kidney Disease (CKD):     Stage 1 with normal or high GFR (GFR > 90 mL/min/1 73 square meters)    Stage 2 Mild CKD (GFR = 60-89 mL/min/1 73 square meters)    Stage 3A Moderate CKD (GFR = 45-59 mL/min/1 73 square meters)    Stage 3B Moderate CKD (GFR = 30-44 mL/min/1 73 square meters)    Stage 4 Severe CKD (GFR = 15-29 mL/min/1 73 square meters)    Stage 5 End Stage CKD (GFR <15 mL/min/1 73 square meters)  Note: GFR calculation is accurate only with a steady state creatinine    Troponin I [[id number]]  (Normal) Collected: 01/11/21 1424    Lab Status: Final result Specimen: Blood from Arm, Right Updated: 01/11/21 1457     Troponin I <0 02 ng/mL     CBC and differential [[id number]]  (Abnormal) Collected: 01/11/21 1424    Lab Status: Final result Specimen: Blood from Arm, Right Updated: 01/11/21 1435     WBC 6 40 Thousand/uL      RBC 4 21 Million/uL      Hemoglobin 13 2 g/dL      Hematocrit 42 3 %       fL      MCH 31 4 pg      MCHC 31 2 g/dL      RDW 13 4 %      MPV 10 1 fL      Platelets 626 Thousands/uL      nRBC 0 /100 WBCs      Neutrophils Relative 70 %      Immat GRANS % 1 %      Lymphocytes Relative 18 %      Monocytes Relative 10 %      Eosinophils Relative 0 %      Basophils Relative 1 %      Neutrophils Absolute 4 50 Thousands/µL      Immature Grans Absolute 0 04 Thousand/uL      Lymphocytes Absolute 1 14 Thousands/µL      Monocytes Absolute 0 66 Thousand/µL      Eosinophils Absolute 0 02 Thousand/µL      Basophils Absolute 0 04 Thousands/µL                  CT chest abdomen pelvis wo contrast   Final Result by Linus Armstrong MD (01/11 1911)      1  Multifocal groundglass opacities in both lungs  In the setting of clinically suspected/proven COVID-19, the above lung parenchymal findings on CT indicate high confidence level for COVID-19    2   No acute abnormality in the abdomen or pelvis  Workstation performed: [id number]         XR chest 1 view portable   ED Interpretation by uD Urias DO (01/11 1510)   Bilateral infiltrates      Final Result by Dl Moore MD (01/11 1515)      Bilateral groundglass opacities within the lungs  In this patient with COVID-19 infection, most suggestive of viral pneumonia  Findings concur with the referring clinician's preliminary interpretation already in the patient's electronic health record  Workstation performed: [id number]                    Procedures  ECG 12 Lead Documentation Only    Date/Time: 1/11/2021 2:37 PM  Performed by: Du Urias DO  Authorized by:  uD Urias DO     Indications / Diagnosis:  Dizziness  ECG reviewed by me, the ED Provider: yes    Patient location:  ED  Previous ECG:     Previous ECG:  Compared to current    Comparison ECG info:  12/29/2020    Similarity:  No change  Interpretation:     Interpretation: normal    Rate:     ECG rate:  84    ECG rate assessment: normal    Rhythm:     Rhythm: sinus rhythm    Ectopy:     Ectopy: none    QRS:     QRS axis:  Normal    QRS intervals:  Normal  Conduction:     Conduction: normal    ST segments:     ST segments:  Normal  T waves:     T waves: normal               ED Course  ED Course as of Jan 11 2122   Mon Jan 11, 2021   1934 Patient stable upon re-evaluation  I discussed the results of laboratories as well as the CT and her COVID results  The patient's creatinine is mildly elevated when compared to previous however the patient has gotten 2 L of fluid while in the emergency department  The patient's oxygen saturation has remained above 94% in the emergency department and therefore she will be discharged with strict COVID precautions  I discussed these with the patient and she verbalized understanding of discharge instructions and warnings  SBIRT 22yo+      Most Recent Value   SBIRT (24 yo +)   In order to provide better care to our patients, we are screening all of our patients for alcohol and drug use  Would it be okay to ask you these screening questions? No Filed at: 01/11/2021 1449                    MDM  Number of Diagnoses or Management Options  Chronic renal insufficiency:   COVID-19:   Dehydration:   Dizziness:   Nausea vomiting and diarrhea:   Diagnosis management comments: Differential diagnosis:  Dehydration, enteritis, ileus, obstruction, pneumonia, COVID, viral syndrome, electrolyte disturbance,, other  Plan is for laboratories, chest x-ray, COVID swab, EKG, CT    Patient clinically improved in the emergency department    I discussed the results laboratories as well as the patient's positive COVID test the need for outpatient follow-up       Amount and/or Complexity of Data Reviewed  Clinical lab tests: reviewed  Tests in the radiology section of CPT®: reviewed  Decide to obtain previous medical records or to obtain history from someone other than the patient: yes  Review and summarize past medical records: yes  Independent visualization of images, tracings, or specimens: yes        Disposition  Final diagnoses:   COVID-19   Nausea vomiting and diarrhea   Dizziness   Dehydration   Chronic renal insufficiency     Time reflects when diagnosis was documented in both MDM as applicable and the Disposition within this note     Time User Action Codes Description Comment    1/11/2021  3:38 PM Juan Reeks Add [U07 1] COVID-19     1/11/2021  3:38 PM Juan Reeks Add [R11 2,  R19 7] Nausea vomiting and diarrhea     1/11/2021  7:35 PM Wilda Binet B Add [R42] Dizziness     1/11/2021  7:35 PM Henryetta Reeks Add [E86 0] Dehydration     1/11/2021  7:35 PM Wilda Binet B Add [N18 9] Chronic renal insufficiency       ED Disposition     ED Disposition Condition Date/Time Comment    Discharge Stable Mon Jan 11, 2021  7:35 PM Jazzy discharge to home/self care              Follow-up Information     Follow up With Specialties Details Why Colleen Mabry 104, DO Internal Medicine Call  For further evaluation and possible further outpatient therapies for your [address]  [address]  [phone]            Discharge Medication List as of 1/11/2021  7:37 PM      CONTINUE these medications which have NOT CHANGED    Details   acetaminophen (TYLENOL) 325 mg tablet Take 650 mg by mouth every 6 (six) hours as needed for mild pain, Historical Med      AMILoride 5 mg tablet Take 1 tablet by mouth twice daily, Normal      aspirin (ECOTRIN LOW STRENGTH) 81 mg EC tablet Take 1 tablet (81 mg total) by mouth daily, Starting Wed 8/14/2019, No Print      atorvastatin (LIPITOR) 40 mg tablet Take 1 tablet (40 mg total) by mouth daily, Starting Tue 7/7/2020, Normal      budesonide-formoterol (SYMBICORT) 160-4 5 mcg/act inhaler Inhale 2 puffs 2 (two) times a day Rinse mouth after use , Starting Tue 8/27/2019, Print      carvedilol (COREG) 3 125 mg tablet Take 2 tablets (6 25 mg total) by mouth 2 (two) times a day with meals, Starting Wed 12/30/2020, No Print      clonazePAM (KlonoPIN) 0 5 mg tablet Take 1 tablet (0 5 mg total) by mouth 3 (three) times a day, Starting Wed 10/21/2020, Normal      DULoxetine (CYMBALTA) 20 mg capsule Take 2 capsules (40 mg total) by mouth daily, Starting Mon 12/28/2020, Normal      fluocinonide (LIDEX) 0 05 % ointment Apply topically 2 (two) times a day, Starting Wed 3/18/2020, Print      fluvoxaMINE (LUVOX) 100 mg tablet 3 at bedtime, Starting Wed 10/10/2018, Historical Med      HYDROcodone-acetaminophen (NORCO) 5-325 mg per tablet Take 1 tablet by mouth every 6 (six) hours as needed for painMax Daily Amount: 4 tablets, Starting Fri 11/27/2020, Normal      lamoTRIgine (LaMICtal) 200 MG tablet Take 400 mg by mouth daily at bedtime , Starting Mon 4/13/2020, Historical Med      linaCLOtide (Linzess) 290 MCG CAPS Take 1 capsule by mouth daily, Starting Thu 12/3/2020, Until Wed 3/3/2021, Print      naloxone (NARCAN) 4 mg/0 1 mL nasal spray Administer 1 spray into a nostril   If breathing does not return to normal or if breathing difficulty resumes after 2-3 minutes, give another dose in the other nostril using a new spray , Normal      omeprazole (PriLOSEC) 20 mg delayed release capsule Take 1 capsule (20 mg total) by mouth daily at bedtime, Starting Thu 8/20/2020, Normal      ondansetron (ZOFRAN-ODT) 4 mg disintegrating tablet Dissolve 1 tablet in mouth every 8 hours prn nausea and vomiting, Normal      Polyethylene Glycol 3350 (MIRALAX PO) Take by mouth as needed , Historical Med      !! QUEtiapine (SEROquel) 100 mg tablet Take 1 tablet by mouth daily at bedtime 1 at bedtime in addition to 400 mg tab, Historical Med      !! QUEtiapine (SEROQUEL) 300 mg tablet Take 1 tablet (300 mg total) by mouth every morning 1 in the AM     ( also takes 400 mg at bedtime), Starting Wed 1/2/2019, Normal      !! QUEtiapine (SEROquel) 400 MG tablet 1 at bedtime     (also takes 100 mg and 300 mg tabs), Historical Med      traMADol (ULTRAM) 50 mg tablet Take 2 tabs by mouth twice daily, Normal       !! - Potential duplicate medications found  Please discuss with provider  No discharge procedures on file      PDMP Review       Value Time User    PDMP Reviewed  Yes 12/28/2020  3:00 PM Shara Carter DO          ED Provider  Electronically Signed by           Juan Antonio Centeno DO  01/11/21 9816

## 2021-01-12 ENCOUNTER — TELEPHONE (OUTPATIENT)
Dept: FAMILY MEDICINE CLINIC | Facility: HOSPITAL | Age: 61
End: 2021-01-12

## 2021-01-12 ENCOUNTER — APPOINTMENT (OUTPATIENT)
Dept: PHYSICAL THERAPY | Facility: CLINIC | Age: 61
End: 2021-01-12
Payer: MEDICARE

## 2021-01-12 LAB
ATRIAL RATE: 84 BPM
P AXIS: 52 DEGREES
PR INTERVAL: 128 MS
QRS AXIS: 1 DEGREES
QRSD INTERVAL: 110 MS
QT INTERVAL: 386 MS
QTC INTERVAL: 456 MS
T WAVE AXIS: 51 DEGREES
VENTRICULAR RATE: 84 BPM

## 2021-01-12 PROCEDURE — 93010 ELECTROCARDIOGRAM REPORT: CPT | Performed by: INTERNAL MEDICINE

## 2021-01-12 NOTE — TELEPHONE ENCOUNTER
Pt called  She was in ER yesterday and is covid positive  I wanted to schedule a vritual today with Dr Jayla Copeland however the patient wanted to wait until tomorrow with [place]  I told pt if she should develop more severe symptoms, SOB or leg pain to call  Do not wait until tomorrow  Told her to quarantine which she said she was not aware of  Pt was seen for ear lavage earlier this week  Unbeknownst to us she had symptoms at that time  She said when we asked the COVID questions, she answered no to all of them because she thought medication was causing her symptoms

## 2021-01-12 NOTE — DISCHARGE INSTRUCTIONS

## 2021-01-12 NOTE — TELEPHONE ENCOUNTER
Yes, she has covid pneumonia  No treatment except symptomatic  Very contagious  Isolate  If cough worsens or pulse ox drops below 90, should consider ER again

## 2021-01-12 NOTE — TELEPHONE ENCOUNTER
I talked to pt  She was put on [facility] sched for tomorrow for a virtual visit  pts current sx are diarrhea and a little cough productive for some mucous  She said the n/v are better with the zofran that the er gave her  She takes Linzess, doesn't have imodium in house, but can have someone get some  She has 5 other people in house and she is isolating in her room and having meals brought to her  She had cxr and CT of chest done in er  She wanted those results, she cant remember if the er told her results --she thought maybe someone said covid pneumonia, but isnt sure  Pls review    thx dk

## 2021-01-13 ENCOUNTER — TELEMEDICINE (OUTPATIENT)
Dept: FAMILY MEDICINE CLINIC | Facility: HOSPITAL | Age: 61
End: 2021-01-13
Payer: MEDICARE

## 2021-01-13 VITALS
HEIGHT: 67 IN | DIASTOLIC BLOOD PRESSURE: 74 MMHG | WEIGHT: 210 LBS | SYSTOLIC BLOOD PRESSURE: 143 MMHG | BODY MASS INDEX: 32.96 KG/M2

## 2021-01-13 DIAGNOSIS — U07.1 PNEUMONIA DUE TO COVID-19 VIRUS: Primary | ICD-10-CM

## 2021-01-13 DIAGNOSIS — M81.6 LOCALIZED OSTEOPOROSIS (LEQUESNE): ICD-10-CM

## 2021-01-13 DIAGNOSIS — J12.82 PNEUMONIA DUE TO COVID-19 VIRUS: Primary | ICD-10-CM

## 2021-01-13 DIAGNOSIS — R06.02 SOB (SHORTNESS OF BREATH): ICD-10-CM

## 2021-01-13 PROCEDURE — 99442 PR PHYS/QHP TELEPHONE EVALUATION 11-20 MIN: CPT | Performed by: PHYSICIAN ASSISTANT

## 2021-01-13 RX ORDER — PREDNISONE 10 MG/1
TABLET ORAL
Qty: 20 TABLET | Refills: 0 | Status: SHIPPED | OUTPATIENT
Start: 2021-01-13 | End: 2021-04-19 | Stop reason: ALTCHOICE

## 2021-01-13 RX ORDER — LEVOFLOXACIN 500 MG/1
500 TABLET, FILM COATED ORAL EVERY 24 HOURS
Qty: 7 TABLET | Refills: 0 | Status: SHIPPED | OUTPATIENT
Start: 2021-01-13 | End: 2021-01-20

## 2021-01-13 NOTE — PROGRESS NOTES
Virtual Brief Visit    Assessment/Plan:    Problem List Items Addressed This Visit     None      Visit Diagnoses     Pneumonia due to COVID-19 virus    -  Primary    Relevant Medications    levofloxacin (LEVAQUIN) 500 mg tablet    SOB (shortness of breath)        Relevant Medications    predniSONE 10 mg tablet    Other Relevant Orders    DXA bone density spine hip and pelvis    Localized osteoporosis ([place])         Relevant Orders    DXA bone density spine hip and pelvis      Patient to hold off taking Levaquin pending sx , but can start Prednisone, also to do deep breathing exercises, and start:   Vitamin D 4000IU, vitamin C 2000 and Zinc 50 mg  Also   To rest and push fluids  Make apt  In our office for Friday follow up  Reason for visit is   Chief Complaint   Patient presents with    COVID-19     PT tested positive on 1/11 - has severe headaches  SOB, nausea,vomiting, loss of appetite   Virtual Brief Visit        Encounter provider Riddhi Ridley PA-C    Provider located at [address] [place] [address]    Recent Visits  Date Type Provider Dept   01/12/21 Telephone Sigrid Mcrae, [facility]   01/08/21 Telephone Roshni Bishop [facility]   01/06/21 Telephone Alyson Espinal [facility]   01/06/21 Office Visit Riddhi Ridley PA-C [address] [facility]   Showing recent visits within past 7 days and meeting all other requirements     Today's Visits  Date Type Provider Dept   01/13/21 Telemedicine Riddhi Ridley PA-C [address] [facility]   Showing today's visits and meeting all other requirements     Future Appointments  No visits were found meeting these conditions     Showing future appointments within next 150 days and meeting all other requirements        After connecting through telephone, the patient was identified by name and date of birth  Devonte Mcmahan was informed that this is a telemedicine visit and that the visit is being conducted through telephone  My office door was closed  No one else was in the room  She acknowledged consent and understanding of privacy and security of the platform  The patient has agreed to participate and understands she can discontinue the visit at any time  Patient is aware this is a billable service  Subjective    Devonte Mcmahan is a 64 y o  female C/o diarrhea, abd  Pain and slight cough since past Monday night, 2 days ago            Past Medical History:   Diagnosis Date    Ankle sprain     left    Anxiety disorder     Bipolar 2 disorder (HCC)     Chronic back pain     CKD (chronic kidney disease) stage 3, GFR 30-59 ml/min     stage 4 (as per pt)    CVA (cerebral vascular accident) ([address] )     noted on MRI in the past    Depression     GERD (gastroesophageal reflux disease)     Hypercholesteremia     Hyperlipidemia     Hypernatremia     Hypertension     Hypokalemia     Intervertebral disc disorder with radiculopathy of lumbosacral region     resolved: 2015    Kidney disease     Panic attacks     Pericardial effusion     PONV (postoperative nausea and vomiting)     Radiculitis     resolved: 2015    Secondary renal hyperparathyroidism ([address] )     Vitamin D deficiency        Past Surgical History:   Procedure Laterality Date    BUNIONECTOMY      Left foot     COLON SURGERY      COLONOSCOPY  2018    DILATION AND CURETTAGE OF UTERUS      INDUCED       surgically induced    [place] ANASTOMOSIS,AV,ANY SITE Left 2019    Procedure: CREATION FISTULA ARTERIOVENOUS (AV) left wrists possible left upper;  Surgeon: Jose Daniel Ulloa MD;  Location: [facility];  Service: Vascular    VT Omid W/WAYNE W/ALEJANDRA Ruby Leanne Left 2019    Procedure: Kip Davis;  Surgeon: Sandra Samson DPM;  Location:  MAIN OR;  Service: [address] [facility] W/SESMDC W/DIST Nicole Sheela Right 8/3/2020    Procedure: Jersey Grewaln;  Surgeon: Stephanie Go DPM;  [facility];  Service: Podiatry    OR ERCP DX COLLECTION SPECIMEN BRUSHING/WASHING N/A 4/11/2018    Procedure: ENDOSCOPIC RETROGRADE CHOLANGIOPANCREATOGRAPHY (ERCP);   Surgeon: Nazanin Patino MD;  [facility];  Service: Gastroenterology    OR LAP, SURG PROCTOPEXY N/A 7/13/2018    Procedure: ROBOTIC SIGMOID RESECTION / RECTOPEXY;  Surgeon: Dominic Holland MD;  [facility];  Service: Colorectal    OR SIGMOIDOSCOPY FLX DX W/COLLJ SPEC BR/WA IF PFRMD N/A 7/13/2018    Procedure: SIGMOIDOSCOPY FLEXIBLE;  Surgeon: Dominic Holland MD;  [facility];  Service: Colorectal    TUBAL LIGATION Bilateral 1997    US GUIDED THYROID BIOPSY  7/30/2019       Current Outpatient Medications   Medication Sig Dispense Refill    acetaminophen (TYLENOL) 325 mg tablet Take 650 mg by mouth every 6 (six) hours as needed for mild pain      AMILoride 5 mg tablet Take 1 tablet by mouth twice daily 120 tablet 0    aspirin (ECOTRIN LOW STRENGTH) 81 mg EC tablet Take 1 tablet (81 mg total) by mouth daily 30 tablet 0    atorvastatin (LIPITOR) 40 mg tablet Take 1 tablet (40 mg total) by mouth daily 30 tablet 11    carvedilol (COREG) 3 125 mg tablet Take 2 tablets (6 25 mg total) by mouth 2 (two) times a day with meals (Patient taking differently: Take 6 25 mg by mouth 2 (two) times a day with meals ) 180 tablet 0    clonazePAM (KlonoPIN) 0 5 mg tablet Take 1 tablet (0 5 mg total) by mouth 3 (three) times a day 270 tablet 0    fluocinonide (LIDEX) 0 05 % ointment Apply topically 2 (two) times a day 60 g 1    fluvoxaMINE (LUVOX) 100 mg tablet 3 at bedtime      HYDROcodone-acetaminophen (NORCO) 5-325 mg per tablet Take 1 tablet by mouth every 6 (six) hours as needed for painMax Daily Amount: 4 tablets 20 tablet 0    lamoTRIgine (LaMICtal) 200 MG tablet Take 400 mg by mouth daily at bedtime       linaCLOtide (Linzess) 290 MCG CAPS Take 1 capsule by mouth daily 90 capsule 3    naloxone (NARCAN) 4 mg/0 1 mL nasal spray Administer 1 spray into a nostril  If breathing does not return to normal or if breathing difficulty resumes after 2-3 minutes, give another dose in the other nostril using a new spray  1 each 1    omeprazole (PriLOSEC) 20 mg delayed release capsule Take 1 capsule (20 mg total) by mouth daily at bedtime 90 capsule 3    ondansetron (ZOFRAN-ODT) 4 mg disintegrating tablet Dissolve 1 tablet in mouth every 8 hours prn nausea and vomiting 60 tablet 1    Polyethylene Glycol 3350 (MIRALAX PO) Take by mouth as needed       QUEtiapine (SEROquel) 100 mg tablet Take 1 tablet by mouth daily at bedtime 1 at bedtime in addition to 400 mg tab      QUEtiapine (SEROQUEL) 300 mg tablet Take 1 tablet (300 mg total) by mouth every morning 1 in the AM     ( also takes 400 mg at bedtime) 90 tablet 3    QUEtiapine (SEROquel) 400 MG tablet 1 at bedtime     (also takes 100 mg and 300 mg tabs)      traMADol (ULTRAM) 50 mg tablet Take 2 tabs by mouth twice daily 120 tablet 0    budesonide-formoterol (SYMBICORT) 160-4 5 mcg/act inhaler Inhale 2 puffs 2 (two) times a day Rinse mouth after use  (Patient not taking: Reported on 12/29/2020) 3 Inhaler 3    levofloxacin (LEVAQUIN) 500 mg tablet Take 1 tablet (500 mg total) by mouth every 24 hours for 7 days 7 tablet 0    predniSONE 10 mg tablet Take 3 pills for 3 days, then 2 pills for 3 days, then 1 pill daily until competed  20 tablet 0     No current facility-administered medications for this visit  Allergies   Allergen Reactions    Pollen Extract Nasal Congestion       Review of Systems   Constitutional: Positive for chills, diaphoresis and fatigue  Negative for fever  HENT: Positive for congestion  Negative for ear pain, rhinorrhea and sore throat      Respiratory: Positive for chest tightness and shortness of breath  Negative for cough  Gastrointestinal: Positive for abdominal pain, diarrhea, nausea and vomiting  Neurological: Positive for dizziness and light-headedness  Vitals:    01/13/21 1007   BP: 143/74   Weight: 95 3 kg (210 lb)   Height: 5' 7" (1 702 m)         I spent 20 minutes directly with the patient during this visit    [address] acknowledges that she has consented to an online visit or consultation  She understands that the online visit is based solely on information provided by her, and that, in the absence of a face-to-face physical evaluation by the physician, the diagnosis she receives is both limited and provisional in terms of accuracy and completeness  This is not intended to replace a full medical face-to-face evaluation by the physician  Boubacar Correa understands and accepts these terms

## 2021-01-14 ENCOUNTER — APPOINTMENT (OUTPATIENT)
Dept: PHYSICAL THERAPY | Facility: CLINIC | Age: 61
End: 2021-01-14
Payer: MEDICARE

## 2021-01-15 ENCOUNTER — TELEPHONE (OUTPATIENT)
Dept: NEPHROLOGY | Facility: CLINIC | Age: 61
End: 2021-01-15

## 2021-01-15 DIAGNOSIS — N18.4 CKD (CHRONIC KIDNEY DISEASE) STAGE 4, GFR 15-29 ML/MIN (HCC): ICD-10-CM

## 2021-01-15 DIAGNOSIS — I10 ESSENTIAL HYPERTENSION: Primary | ICD-10-CM

## 2021-01-15 NOTE — TELEPHONE ENCOUNTER
I spoke to the patient and she stated she was in the ER and had blood work done  She wanted to make sure Dr Margret Bates saw it

## 2021-01-18 ENCOUNTER — TELEMEDICINE (OUTPATIENT)
Dept: FAMILY MEDICINE CLINIC | Facility: HOSPITAL | Age: 61
End: 2021-01-18
Payer: MEDICARE

## 2021-01-18 ENCOUNTER — TELEPHONE (OUTPATIENT)
Dept: FAMILY MEDICINE CLINIC | Facility: HOSPITAL | Age: 61
End: 2021-01-18

## 2021-01-18 DIAGNOSIS — J45.40 MODERATE PERSISTENT ASTHMA WITHOUT COMPLICATION: Primary | ICD-10-CM

## 2021-01-18 DIAGNOSIS — N39.0 URINARY TRACT INFECTION: ICD-10-CM

## 2021-01-18 DIAGNOSIS — U07.1 PNEUMONIA DUE TO COVID-19 VIRUS: ICD-10-CM

## 2021-01-18 DIAGNOSIS — F31.81 BIPOLAR 2 DISORDER (HCC): Chronic | ICD-10-CM

## 2021-01-18 DIAGNOSIS — J12.82 PNEUMONIA DUE TO COVID-19 VIRUS: ICD-10-CM

## 2021-01-18 DIAGNOSIS — R53.1 GENERALIZED WEAKNESS: ICD-10-CM

## 2021-01-18 PROBLEM — S82.831D CLOSED FRACTURE OF DISTAL END OF RIGHT FIBULA WITH ROUTINE HEALING: Status: RESOLVED | Noted: 2020-11-04 | Resolved: 2021-01-18

## 2021-01-18 PROCEDURE — 99442 PR PHYS/QHP TELEPHONE EVALUATION 11-20 MIN: CPT | Performed by: PHYSICIAN ASSISTANT

## 2021-01-18 RX ORDER — ONDANSETRON 4 MG/1
TABLET, ORALLY DISINTEGRATING ORAL
Qty: 60 TABLET | Refills: 0 | Status: SHIPPED | OUTPATIENT
Start: 2021-01-18 | End: 2021-05-07 | Stop reason: SDUPTHER

## 2021-01-18 NOTE — TELEPHONE ENCOUNTER
Yes I reviewed she is covid positive with bump in creatinine  Lets have her repeat a bmp and cbc in 2 weeks  If they ordered blood work already let me know  Also encourage hydration and have her monitor her BP and let me know if its low  Thanks

## 2021-01-19 ENCOUNTER — APPOINTMENT (OUTPATIENT)
Dept: PHYSICAL THERAPY | Facility: CLINIC | Age: 61
End: 2021-01-19
Payer: MEDICARE

## 2021-01-20 NOTE — TELEPHONE ENCOUNTER
This is a Dr Samantha Nance pt  Pt would like a call back to go over her lab results  She can be reached [phone]

## 2021-01-20 NOTE — TELEPHONE ENCOUNTER
I spoke to the patient and she is aware the her creatinine was up a bit, possibly due to dehydration  She will make sure she hydrates, and repeat a BMP and CBC in 2 weeks   She also will keep an eye on her BP and call office if it's too low or too high

## 2021-01-21 ENCOUNTER — APPOINTMENT (OUTPATIENT)
Dept: PHYSICAL THERAPY | Facility: CLINIC | Age: 61
End: 2021-01-21
Payer: MEDICARE

## 2021-01-22 DIAGNOSIS — I10 HTN (HYPERTENSION): ICD-10-CM

## 2021-01-22 RX ORDER — AMILORIDE HYDROCHLORIDE 5 MG/1
TABLET ORAL
Qty: 120 TABLET | Refills: 0 | Status: SHIPPED | OUTPATIENT
Start: 2021-01-22 | End: 2021-03-23

## 2021-01-25 DIAGNOSIS — F31.81 BIPOLAR 2 DISORDER (HCC): Chronic | ICD-10-CM

## 2021-01-25 DIAGNOSIS — M46.1 BILATERAL SACROILIITIS (HCC): ICD-10-CM

## 2021-01-26 ENCOUNTER — APPOINTMENT (OUTPATIENT)
Dept: PHYSICAL THERAPY | Facility: CLINIC | Age: 61
End: 2021-01-26
Payer: MEDICARE

## 2021-01-26 RX ORDER — CLONAZEPAM 0.5 MG/1
0.5 TABLET ORAL 3 TIMES DAILY
Qty: 270 TABLET | Refills: 0 | Status: SHIPPED | OUTPATIENT
Start: 2021-01-26 | End: 2021-04-21 | Stop reason: SDUPTHER

## 2021-01-26 RX ORDER — TRAMADOL HYDROCHLORIDE 50 MG/1
TABLET ORAL
Qty: 120 TABLET | Refills: 0 | Status: SHIPPED | OUTPATIENT
Start: 2021-01-26 | End: 2021-02-25 | Stop reason: SDUPTHER

## 2021-01-28 ENCOUNTER — TELEPHONE (OUTPATIENT)
Dept: FAMILY MEDICINE CLINIC | Facility: HOSPITAL | Age: 61
End: 2021-01-28

## 2021-01-28 ENCOUNTER — APPOINTMENT (OUTPATIENT)
Dept: PHYSICAL THERAPY | Facility: CLINIC | Age: 61
End: 2021-01-28
Payer: MEDICARE

## 2021-01-28 DIAGNOSIS — J45.40 MODERATE PERSISTENT ASTHMA WITHOUT COMPLICATION: ICD-10-CM

## 2021-01-28 DIAGNOSIS — U07.1 PNEUMONIA DUE TO COVID-19 VIRUS: Primary | ICD-10-CM

## 2021-01-28 DIAGNOSIS — J12.82 PNEUMONIA DUE TO COVID-19 VIRUS: Primary | ICD-10-CM

## 2021-01-28 NOTE — TELEPHONE ENCOUNTER
Pt states she is still having SOB, legs hurt and nausea after eating    Pt can be reached at [phone]

## 2021-01-28 NOTE — TELEPHONE ENCOUNTER
Spoke to patient, reduce statin to every other day, and consider [id number] mg  Daily  Also tylenol for pain, and repeat lipids, and get vitamin D level

## 2021-01-29 DIAGNOSIS — E04.1 THYROID NODULE: Primary | ICD-10-CM

## 2021-01-29 DIAGNOSIS — E78.00 HYPERCHOLESTEREMIA: Chronic | ICD-10-CM

## 2021-01-29 DIAGNOSIS — M81.6 LOCALIZED OSTEOPOROSIS (LEQUESNE): ICD-10-CM

## 2021-01-29 DIAGNOSIS — R79.89 ELEVATED LFTS: ICD-10-CM

## 2021-01-29 DIAGNOSIS — J45.40 MODERATE PERSISTENT ASTHMA WITHOUT COMPLICATION: ICD-10-CM

## 2021-01-29 DIAGNOSIS — S82.831D CLOSED FRACTURE OF DISTAL END OF RIGHT FIBULA WITH ROUTINE HEALING, UNSPECIFIED FRACTURE MORPHOLOGY, SUBSEQUENT ENCOUNTER: ICD-10-CM

## 2021-01-29 DIAGNOSIS — F31.81 BIPOLAR 2 DISORDER (HCC): ICD-10-CM

## 2021-02-02 ENCOUNTER — APPOINTMENT (OUTPATIENT)
Dept: PHYSICAL THERAPY | Facility: CLINIC | Age: 61
End: 2021-02-02
Payer: MEDICARE

## 2021-02-03 ENCOUNTER — TELEPHONE (OUTPATIENT)
Dept: FAMILY MEDICINE CLINIC | Facility: HOSPITAL | Age: 61
End: 2021-02-03

## 2021-02-03 DIAGNOSIS — J12.82 PNEUMONIA DUE TO COVID-19 VIRUS: ICD-10-CM

## 2021-02-03 DIAGNOSIS — U07.1 PNEUMONIA DUE TO COVID-19 VIRUS: ICD-10-CM

## 2021-02-03 DIAGNOSIS — M79.10 MYALGIA: Primary | ICD-10-CM

## 2021-02-03 RX ORDER — METHYLPREDNISOLONE 4 MG/1
TABLET ORAL
Qty: 21 EACH | Refills: 0 | Status: SHIPPED | OUTPATIENT
Start: 2021-02-03 | End: 2021-04-19 | Stop reason: ALTCHOICE

## 2021-02-03 NOTE — TELEPHONE ENCOUNTER
Patient was diagnosed as positive on January 11, 2021 for COVID  Inflammatory changes can occur with this viral illness    I will order labs and I will place her on a Medrol Dosepak and if she is not improving with that we will have her set up to see rheumatology

## 2021-02-04 ENCOUNTER — APPOINTMENT (OUTPATIENT)
Dept: PHYSICAL THERAPY | Facility: CLINIC | Age: 61
End: 2021-02-04
Payer: MEDICARE

## 2021-02-04 ENCOUNTER — TELEPHONE (OUTPATIENT)
Dept: FAMILY MEDICINE CLINIC | Facility: HOSPITAL | Age: 61
End: 2021-02-04

## 2021-02-05 ENCOUNTER — LAB (OUTPATIENT)
Dept: LAB | Facility: HOSPITAL | Age: 61
End: 2021-02-05
Attending: INTERNAL MEDICINE
Payer: MEDICARE

## 2021-02-05 DIAGNOSIS — U07.1 PNEUMONIA DUE TO COVID-19 VIRUS: ICD-10-CM

## 2021-02-05 DIAGNOSIS — I10 ESSENTIAL HYPERTENSION: ICD-10-CM

## 2021-02-05 DIAGNOSIS — M79.10 MYALGIA: ICD-10-CM

## 2021-02-05 DIAGNOSIS — N18.4 CKD (CHRONIC KIDNEY DISEASE) STAGE 4, GFR 15-29 ML/MIN (HCC): ICD-10-CM

## 2021-02-05 DIAGNOSIS — J12.82 PNEUMONIA DUE TO COVID-19 VIRUS: ICD-10-CM

## 2021-02-05 LAB
ALBUMIN SERPL BCP-MCNC: 3.2 G/DL (ref 3.5–5)
ALP SERPL-CCNC: 282 U/L (ref 46–116)
ALT SERPL W P-5'-P-CCNC: 22 U/L (ref 12–78)
ANION GAP SERPL CALCULATED.3IONS-SCNC: 6 MMOL/L (ref 4–13)
AST SERPL W P-5'-P-CCNC: 20 U/L (ref 5–45)
BASOPHILS # BLD AUTO: 0.05 THOUSANDS/ΜL (ref 0–0.1)
BASOPHILS NFR BLD AUTO: 1 % (ref 0–1)
BILIRUB SERPL-MCNC: 0.2 MG/DL (ref 0.2–1)
BUN SERPL-MCNC: 26 MG/DL (ref 5–25)
CALCIUM ALBUM COR SERPL-MCNC: 10.6 MG/DL (ref 8.3–10.1)
CALCIUM SERPL-MCNC: 10 MG/DL (ref 8.3–10.1)
CHLORIDE SERPL-SCNC: 114 MMOL/L (ref 100–108)
CO2 SERPL-SCNC: 25 MMOL/L (ref 21–32)
CREAT SERPL-MCNC: 1.78 MG/DL (ref 0.6–1.3)
CRP SERPL QL: 6 MG/L
EOSINOPHIL # BLD AUTO: 0.44 THOUSAND/ΜL (ref 0–0.61)
EOSINOPHIL NFR BLD AUTO: 11 % (ref 0–6)
ERYTHROCYTE [DISTWIDTH] IN BLOOD BY AUTOMATED COUNT: 14.7 % (ref 11.6–15.1)
GFR SERPL CREATININE-BSD FRML MDRD: 31 ML/MIN/1.73SQ M
GLUCOSE SERPL-MCNC: 100 MG/DL (ref 65–140)
HCT VFR BLD AUTO: 37.1 % (ref 34.8–46.1)
HGB BLD-MCNC: 11.4 G/DL (ref 11.5–15.4)
IMM GRANULOCYTES # BLD AUTO: 0.01 THOUSAND/UL (ref 0–0.2)
IMM GRANULOCYTES NFR BLD AUTO: 0 % (ref 0–2)
LYMPHOCYTES # BLD AUTO: 1.17 THOUSANDS/ΜL (ref 0.6–4.47)
LYMPHOCYTES NFR BLD AUTO: 29 % (ref 14–44)
MCH RBC QN AUTO: 32 PG (ref 26.8–34.3)
MCHC RBC AUTO-ENTMCNC: 30.7 G/DL (ref 31.4–37.4)
MCV RBC AUTO: 104 FL (ref 82–98)
MONOCYTES # BLD AUTO: 0.44 THOUSAND/ΜL (ref 0.17–1.22)
MONOCYTES NFR BLD AUTO: 11 % (ref 4–12)
NEUTROPHILS # BLD AUTO: 1.88 THOUSANDS/ΜL (ref 1.85–7.62)
NEUTS SEG NFR BLD AUTO: 48 % (ref 43–75)
NRBC BLD AUTO-RTO: 0 /100 WBCS
PLATELET # BLD AUTO: 252 THOUSANDS/UL (ref 149–390)
PMV BLD AUTO: 10.6 FL (ref 8.9–12.7)
POTASSIUM SERPL-SCNC: 5.1 MMOL/L (ref 3.5–5.3)
PROT SERPL-MCNC: 6.8 G/DL (ref 6.4–8.2)
RBC # BLD AUTO: 3.56 MILLION/UL (ref 3.81–5.12)
SODIUM SERPL-SCNC: 145 MMOL/L (ref 136–145)
WBC # BLD AUTO: 3.99 THOUSAND/UL (ref 4.31–10.16)

## 2021-02-05 PROCEDURE — 86140 C-REACTIVE PROTEIN: CPT

## 2021-02-05 PROCEDURE — 36415 COLL VENOUS BLD VENIPUNCTURE: CPT

## 2021-02-05 PROCEDURE — 85025 COMPLETE CBC W/AUTO DIFF WBC: CPT

## 2021-02-05 PROCEDURE — 80053 COMPREHEN METABOLIC PANEL: CPT

## 2021-02-08 ENCOUNTER — TELEPHONE (OUTPATIENT)
Dept: FAMILY MEDICINE CLINIC | Facility: HOSPITAL | Age: 61
End: 2021-02-08

## 2021-02-08 DIAGNOSIS — N25.81 SECONDARY RENAL HYPERPARATHYROIDISM (HCC): ICD-10-CM

## 2021-02-08 DIAGNOSIS — N18.30 CHRONIC KIDNEY DISEASE, STAGE 3 (HCC): ICD-10-CM

## 2021-02-08 DIAGNOSIS — I10 ESSENTIAL HYPERTENSION: ICD-10-CM

## 2021-02-08 DIAGNOSIS — E23.2 DIABETES INSIPIDUS (HCC): ICD-10-CM

## 2021-02-08 DIAGNOSIS — N28.1 RENAL CYST: ICD-10-CM

## 2021-02-08 NOTE — TELEPHONE ENCOUNTER
Lm for pt on her Vm DD    ----- Message from Marizol Tan DO sent at 2/8/2021 12:03 PM EST -----  If she has not seen pain management okay to give a referral  ----- Message -----  From: Mervin Mejia  Sent: 2/8/2021   8:36 AM EST  To: Marizol Tan DO    I gave patient her current lab results she is aware however she is still c/o pain in her legs    Please advise she would like to know what she can do to help with this DD

## 2021-02-09 ENCOUNTER — APPOINTMENT (OUTPATIENT)
Dept: PHYSICAL THERAPY | Facility: CLINIC | Age: 61
End: 2021-02-09
Payer: MEDICARE

## 2021-02-09 DIAGNOSIS — N25.81 SECONDARY RENAL HYPERPARATHYROIDISM (HCC): ICD-10-CM

## 2021-02-09 DIAGNOSIS — E23.2 DIABETES INSIPIDUS (HCC): ICD-10-CM

## 2021-02-09 DIAGNOSIS — I10 ESSENTIAL HYPERTENSION: ICD-10-CM

## 2021-02-09 DIAGNOSIS — N28.1 RENAL CYST: ICD-10-CM

## 2021-02-09 DIAGNOSIS — N18.30 CHRONIC KIDNEY DISEASE, STAGE 3 (HCC): ICD-10-CM

## 2021-02-09 RX ORDER — CARVEDILOL 3.12 MG/1
6.25 TABLET ORAL 2 TIMES DAILY WITH MEALS
Qty: 90 TABLET | Refills: 3 | Status: SHIPPED | OUTPATIENT
Start: 2021-02-09 | End: 2021-05-10

## 2021-02-09 RX ORDER — CARVEDILOL 3.12 MG/1
6.25 TABLET ORAL 2 TIMES DAILY WITH MEALS
Start: 2021-02-09 | End: 2021-02-09 | Stop reason: SDUPTHER

## 2021-02-11 ENCOUNTER — TELEPHONE (OUTPATIENT)
Dept: NEPHROLOGY | Facility: CLINIC | Age: 61
End: 2021-02-11

## 2021-02-11 NOTE — TELEPHONE ENCOUNTER
Patient left the office a voicemail requesting Dr Lisa Solitario to look over her recent labs that were done on 2/5/21

## 2021-02-12 ENCOUNTER — OFFICE VISIT (OUTPATIENT)
Dept: OBGYN CLINIC | Facility: CLINIC | Age: 61
End: 2021-02-12
Payer: MEDICARE

## 2021-02-12 ENCOUNTER — APPOINTMENT (OUTPATIENT)
Dept: RADIOLOGY | Facility: CLINIC | Age: 61
End: 2021-02-12
Payer: MEDICARE

## 2021-02-12 VITALS
SYSTOLIC BLOOD PRESSURE: 150 MMHG | WEIGHT: 195 LBS | BODY MASS INDEX: 30.61 KG/M2 | DIASTOLIC BLOOD PRESSURE: 84 MMHG | HEIGHT: 67 IN | TEMPERATURE: 99.2 F

## 2021-02-12 DIAGNOSIS — M25.561 PAIN IN BOTH KNEES, UNSPECIFIED CHRONICITY: ICD-10-CM

## 2021-02-12 DIAGNOSIS — M17.11 PRIMARY OSTEOARTHRITIS OF RIGHT KNEE: Primary | ICD-10-CM

## 2021-02-12 DIAGNOSIS — M25.562 PAIN IN BOTH KNEES, UNSPECIFIED CHRONICITY: ICD-10-CM

## 2021-02-12 DIAGNOSIS — M22.2X1 RIGHT PATELLOFEMORAL SYNDROME: ICD-10-CM

## 2021-02-12 DIAGNOSIS — M22.2X2 PATELLOFEMORAL SYNDROME OF LEFT KNEE: ICD-10-CM

## 2021-02-12 DIAGNOSIS — M25.461 EFFUSION OF RIGHT KNEE: ICD-10-CM

## 2021-02-12 PROCEDURE — 99213 OFFICE O/P EST LOW 20 MIN: CPT | Performed by: ORTHOPAEDIC SURGERY

## 2021-02-12 PROCEDURE — 73564 X-RAY EXAM KNEE 4 OR MORE: CPT

## 2021-02-12 PROCEDURE — 20610 DRAIN/INJ JOINT/BURSA W/O US: CPT | Performed by: ORTHOPAEDIC SURGERY

## 2021-02-12 RX ORDER — LIDOCAINE HYDROCHLORIDE 10 MG/ML
7 INJECTION, SOLUTION EPIDURAL; INFILTRATION; INTRACAUDAL; PERINEURAL
Status: COMPLETED | OUTPATIENT
Start: 2021-02-12 | End: 2021-02-12

## 2021-02-12 RX ORDER — BETAMETHASONE SODIUM PHOSPHATE AND BETAMETHASONE ACETATE 3; 3 MG/ML; MG/ML
6 INJECTION, SUSPENSION INTRA-ARTICULAR; INTRALESIONAL; INTRAMUSCULAR; SOFT TISSUE
Status: COMPLETED | OUTPATIENT
Start: 2021-02-12 | End: 2021-02-12

## 2021-02-12 RX ADMIN — BETAMETHASONE SODIUM PHOSPHATE AND BETAMETHASONE ACETATE 6 MG: 3; 3 INJECTION, SUSPENSION INTRA-ARTICULAR; INTRALESIONAL; INTRAMUSCULAR; SOFT TISSUE at 16:20

## 2021-02-12 RX ADMIN — LIDOCAINE HYDROCHLORIDE 7 ML: 10 INJECTION, SOLUTION EPIDURAL; INFILTRATION; INTRACAUDAL; PERINEURAL at 16:20

## 2021-02-12 NOTE — PROGRESS NOTES
Assessment:     1  Primary osteoarthritis of right knee    2  Patellofemoral syndrome of left knee    3  Right patellofemoral syndrome    4  Effusion of right knee    5  Pain in both knees, unspecified chronicity        Plan:     Problem List Items Addressed This Visit        Musculoskeletal and Integument    Primary osteoarthritis of right knee - Primary    Relevant Medications    lidocaine (PF) (XYLOCAINE-MPF) 1 % injection 7 mL (Completed)    betamethasone acetate-betamethasone sodium phosphate (CELESTONE) injection 6 mg (Completed)    Other Relevant Orders    Ambulatory referral to Physical Therapy    Large joint arthrocentesis: R knee (Completed)    Patellofemoral syndrome of left knee    Relevant Medications    lidocaine (PF) (XYLOCAINE-MPF) 1 % injection 7 mL (Completed)    betamethasone acetate-betamethasone sodium phosphate (CELESTONE) injection 6 mg (Completed)    Other Relevant Orders    Ambulatory referral to Physical Therapy    Large joint arthrocentesis: L knee (Completed)    Right patellofemoral syndrome    Relevant Medications    lidocaine (PF) (XYLOCAINE-MPF) 1 % injection 7 mL (Completed)    betamethasone acetate-betamethasone sodium phosphate (CELESTONE) injection 6 mg (Completed)    Other Relevant Orders    Ambulatory referral to Physical Therapy    Large joint arthrocentesis: R knee (Completed)    Effusion of right knee    Relevant Medications    lidocaine (PF) (XYLOCAINE-MPF) 1 % injection 7 mL (Completed)    betamethasone acetate-betamethasone sodium phosphate (CELESTONE) injection 6 mg (Completed)    Other Relevant Orders    Large joint arthrocentesis: R knee (Completed)      Other Visit Diagnoses     Pain in both knees, unspecified chronicity        Relevant Orders    XR knee 4+ vw left injury    XR knee 4+ vw right injury           Findings consistent with right knee osteoarthritis with effusion, bilateral knee patellofemoral syndrome    Discussed findings and treatment options with the patient  I reviewed patient's bilateral knee x-ray with her  I discussed prognosis of her knee condition  I provided patient cortisone injection both knee and aspirate the right knee, which patient tolerated well with good pain relief  I will refer patient to physical therapy for bilateral knee rehabilitation  I advised patient to avoid activities that will aggravate her knee such as squatting, kneeling, and climbing repetitively  Cold compress over bilateral knee today  Will see patient back in 6-8 weeks for re-evaluation  All patient's questions were answered to her satisfaction  This note is created using dictation transcription  It may contain typographical errors, grammatical errors, improperly dictated words, background noise and other errors  Subjective:     Patient ID: Jyoti Guerra is a 64 y o  female  Chief Complaint:    61-year-old female was history of bilateral knee patellofemoral syndrome  Patient did not attend therapy when was recommended at the time  Recently her knee pain has been getting worse  She is having difficulty with climbing stairs, squatting, getting up from a sitting position  She often feel grinding sensation in the front of her knee  Her pain is mostly front and back of the knee  She also noticed stiffness especially with the right knee  She denies locking or giving way sensations  Information on patient's intake form was reviewed      Allergy:  Allergies   Allergen Reactions    Pollen Extract Nasal Congestion     Medications:  all current active meds have been reviewed  Past Medical History:  Past Medical History:   Diagnosis Date    Anxiety disorder     Bipolar 2 disorder ([address] )     Chronic back pain     Closed fracture of distal end of right fibula with routine healing 11/4/2020    CVA (cerebral vascular accident) ([address] )     noted on MRI in the past    Depression     GERD (gastroesophageal reflux disease)     Hypercholesteremia     Hypernatremia  Hypertension     Hypokalemia     Idiopathic chronic pancreatitis [facility] ) 3/20/2018    Intervertebral disc disorder with radiculopathy of lumbosacral region     resolved: 2015    Kidney disease     Panic attacks     Pericardial effusion     Radiculitis     resolved: 2015    Secondary renal hyperparathyroidism ([facility] )     Vitamin D deficiency      Past Surgical History:  Past Surgical History:   Procedure Laterality Date    BUNIONECTOMY      Left foot     COLON SURGERY      COLONOSCOPY  2018    DILATION AND CURETTAGE OF UTERUS      INDUCED       surgically induced    [place] ANASTOMOSIS,AV,ANY SITE Left 2019    Procedure: CREATION FISTULA ARTERIOVENOUS (AV) left wrists possible left upper;  Surgeon: Fox Villalba MD;  [facility];  Service: Vascular    [place] W/SESMDC W/DIST Kye Aldo Left 2019    Procedure: Rosalea Rabia;  Surgeon: Alison Loza DPM;  [facility];  Service: [address] [place] [address] W/SESMDC W/DIST Kye Aldo Right 8/3/2020    Procedure: Palmer Dudley;  Surgeon: Alison Loza DPM;  [facility];  Service: Podiatry    [place] ERCP DX COLLECTION SPECIMEN BRUSHING/WASHING N/A 2018    Procedure: ENDOSCOPIC RETROGRADE CHOLANGIOPANCREATOGRAPHY (ERCP);   Surgeon: Kiara Roman MD;  [facility];  Service: Gastroenterology    [place] LAP, SURG PROCTOPEXY N/A 2018    Procedure: ROBOTIC SIGMOID RESECTION / RECTOPEXY;  Surgeon: Cary Hoang MD;  [facility];  Service: Colorectal    [place] SIGMOIDOSCOPY FLX DX W/COLLJ SPEC BR/WA IF PFRMD N/A 2018    Procedure: Colin Selby;  Surgeon: Cary Hoang MD;  [facility];  Service: Colorectal    TUBAL LIGATION Bilateral    [facility] THYROID BIOPSY  2019     Family History:  Family History   Problem Relation Age of Onset    Bipolar disorder Mother     Mental illness Mother         depression  Stroke Mother     Dementia Mother     Colon polyps Mother     Heart disease Father     Hypertension Father     Diabetes Father     Other Family         Back disorder    Diabetes Family     Heart disease Family     Hypertension Family     Breast cancer Family     Stroke Family     Thyroid disease Family     Breast cancer Paternal Grandmother     Breast cancer Paternal [de-identified]     Breast cancer Maternal Aunt     Mental illness Sister     Colon polyps Sister     Mental illness Sister     Heart disease Sister     No Known Problems Sister     No Known Problems Sister     Other Son         pituitary tumor    Hypertension Son     Obesity Son     No Known Problems Son     Substance Abuse Neg Hx         neg fam hx    Colon cancer Neg Hx      Social History:  Social History     Substance and Sexual Activity   Alcohol Use Never    Frequency: Never    Binge frequency: Never     Social History     Substance and Sexual Activity   Drug Use Not Currently    Types: Marijuana     Social History     Tobacco Use   Smoking Status Never Smoker   Smokeless Tobacco Never Used     Review of Systems   Constitutional: Negative  HENT: Negative  Eyes: Negative  Respiratory: Positive for shortness of breath  Cardiovascular: Negative  Gastrointestinal: Negative  Endocrine: Negative  Genitourinary: Negative  Musculoskeletal: Positive for arthralgias (Bilateral knee), joint swelling (Right knee) and myalgias  Skin: Negative  Allergic/Immunologic: Negative  Hematological: Negative  Psychiatric/Behavioral: Negative  Objective:  BP Readings from Last 1 Encounters:   02/12/21 150/84      Wt Readings from Last 1 Encounters:   02/12/21 88 5 kg (195 lb)      BMI:   Estimated body mass index is 30 54 kg/m² as calculated from the following:    Height as of this encounter: 5' 7" (1 702 m)  Weight as of this encounter: 88 5 kg (195 lb)    BSA:   Estimated body surface area is 2 meters squared as calculated from the following:    Height as of this encounter: 5' 7" (1 702 m)  Weight as of this encounter: 88 5 kg (195 lb)  Physical Exam  Vitals signs and nursing note reviewed  Constitutional:       Appearance: Normal appearance  She is well-developed  HENT:      Head: Normocephalic and atraumatic  Right Ear: External ear normal       Left Ear: External ear normal    Eyes:      Extraocular Movements: Extraocular movements intact  Conjunctiva/sclera: Conjunctivae normal    Neck:      Musculoskeletal: Neck supple  Pulmonary:      Effort: Pulmonary effort is normal    Musculoskeletal:      Right knee: She exhibits effusion (Grade 2)  Left knee: She exhibits no effusion  Skin:     General: Skin is warm and dry  Neurological:      Mental Status: She is alert and oriented to person, place, and time  Deep Tendon Reflexes: Reflexes are normal and symmetric  Psychiatric:         Mood and Affect: Mood normal          Behavior: Behavior normal        Right Knee Exam     Muscle Strength   The patient has normal right knee strength  Tenderness   Right knee tenderness location: Diffused  Range of Motion   The patient has normal right knee ROM  Tests   Kateryna:  Medial - negative Lateral - negative  Varus: negative Valgus: negative  Patellar apprehension: negative    Other   Erythema: absent  Scars: absent  Sensation: normal  Pulse: present  Swelling: none  Effusion: effusion (Grade 2) present    Comments:    Patellofemoral joint crepitation knee motion      Left Knee Exam     Muscle Strength   The patient has normal left knee strength  Tenderness   Left knee tenderness location: Diffuse  Range of Motion   The patient has normal left knee ROM      Tests   Kateryna:  Medial - negative Lateral - negative  Varus: negative Valgus: negative  Patellar apprehension: negative    Other   Erythema: absent  Scars: absent  Sensation: normal  Pulse: present  Swelling: none  Effusion: no effusion present    Comments:    Patellofemoral joint crepitation knee motion            I have personally reviewed pertinent films in PACS and my interpretation is X-ray of the bilateral knee show mild degenerative changes  No soft tissue calcification  good joint alignment  Large joint arthrocentesis: R knee  Universal Protocol:  Consent: Verbal consent not obtained  Risks and benefits: risks, benefits and alternatives were discussed  Consent given by: patient  Patient understanding: patient states understanding of the procedure being performed  Site marked: the operative site was marked  Supporting Documentation  Indications: pain   Procedure Details  Location: knee - R knee  Preparation: Patient was prepped and draped in the usual sterile fashion  Needle size: 18 G  Ultrasound guidance: no  Approach: Superolateral   Medications administered: 7 mL lidocaine (PF) 1 %; 6 mg betamethasone acetate-betamethasone sodium phosphate 6 (3-3) mg/mL    Aspirate amount: 24 mL  Aspirate: yellow and clear    Patient tolerance: patient tolerated the procedure well with no immediate complications  Dressing:  Sterile dressing applied    Large joint arthrocentesis: L knee  Universal Protocol:  Consent: Verbal consent not obtained  Risks and benefits: risks, benefits and alternatives were discussed  Consent given by: patient  Time out: Immediately prior to procedure a "time out" was called to verify the correct patient, procedure, equipment, support staff and site/side marked as required    Patient understanding: patient states understanding of the procedure being performed  Site marked: the operative site was marked  Supporting Documentation  Indications: pain   Procedure Details  Location: knee - L knee  Preparation: Patient was prepped and draped in the usual sterile fashion  Needle size: 22 G  Ultrasound guidance: no  Approach: anterolateral  Medications administered: 7 mL lidocaine (PF) 1 %; 6 mg betamethasone acetate-betamethasone sodium phosphate 6 (3-3) mg/mL    Patient tolerance: patient tolerated the procedure well with no immediate complications  Dressing:  Sterile dressing applied

## 2021-02-12 NOTE — TELEPHONE ENCOUNTER
Spoke with patient and made her aware that creatinine has actually improved to 1 78 and electrolytes are stable  Advised patient we will continue to monitor  No questions or concerns at this time

## 2021-02-19 ENCOUNTER — APPOINTMENT (OUTPATIENT)
Dept: PHYSICAL THERAPY | Facility: CLINIC | Age: 61
End: 2021-02-19
Payer: MEDICARE

## 2021-02-22 ENCOUNTER — EVALUATION (OUTPATIENT)
Dept: PHYSICAL THERAPY | Facility: CLINIC | Age: 61
End: 2021-02-22
Payer: MEDICARE

## 2021-02-22 DIAGNOSIS — M20.11 HALLUX VALGUS, RIGHT: Primary | ICD-10-CM

## 2021-02-22 DIAGNOSIS — M17.11 PRIMARY OSTEOARTHRITIS OF RIGHT KNEE: ICD-10-CM

## 2021-02-22 DIAGNOSIS — M22.2X2 PATELLOFEMORAL DISORDER OF LEFT KNEE: ICD-10-CM

## 2021-02-22 DIAGNOSIS — M22.2X1 PATELLOFEMORAL DISORDER OF RIGHT KNEE: ICD-10-CM

## 2021-02-22 PROCEDURE — 97164 PT RE-EVAL EST PLAN CARE: CPT | Performed by: PHYSICAL THERAPIST

## 2021-02-22 NOTE — PROGRESS NOTES
PT Re-Evaluation     Today's date: 2021  Patient name: Salud Espinal  : 1960  MRN: [id number]  Referring provider: Kim Dominguez DPM  Dx:   Encounter Diagnosis     ICD-10-CM    1  Hallux valgus, right  M20 11    2  Primary osteoarthritis of right knee  M17 11    3  Patellofemoral disorder of left knee  M22 2X2    4  Patellofemoral disorder of right knee  M22 2X1                   Assessment  Assessment details: Pt is returning to skilled PT after a lapse of care > 30 days  Pt arrives with a new script for B knee pain  Pt presents with increased pain and tenderness to palpation, decreased R knee ROM and strength, and decreased functional activities  Recommend pt initiate skilled PT to address these deficits and promote return to maximal functional activities  Impairments: abnormal or restricted ROM, activity intolerance, impaired physical strength, lacks appropriate home exercise program and pain with function  Barriers to therapy: 1  Insurance cost- pt has Medicare as primary only- no secondary insurance  Understanding of Dx/Px/POC: good   Prognosis: good    Goals  STG's ( 3-4 weeks)  1  Pt will be independent in HEP  2  Improve B ankle df ROM by 5*-10*  3  Pt will have improved R knee flexion ROM by 5*  LTG's ( 6- 8 weeks)  1  Improve FOTO score by 8-10 points  2  Improve R great toe flexion ROM  3  Pt will have less pain going up and down the steps  4  Pt will be able to walk community distances  5   Pt will have less pain with standing activities for household ADL's    Plan  Patient would benefit from: skilled physical therapy  Planned modality interventions: cryotherapy  Planned therapy interventions: manual therapy, joint mobilization, neuromuscular re-education, balance, stretching, strengthening, therapeutic activities, therapeutic exercise, flexibility, functional ROM exercises and home exercise program  Frequency: 2x week  Duration in weeks: 6  Plan of Care beginning date: 2021  Plan of Care expiration date: 2021  Treatment plan discussed with: patient        Subjective Evaluation    History of Present Illness  Date of surgery: 8/3/2020  Mechanism of injury: trauma  Mechanism of injury: I E: Pt fell down 3 steps with her feet in a plantar flexed position on her knees  Pt was carrying her dog and she thought she was at the bottom of the flight of steps  Pt was casted 3 times and was NWB  Pt has a CAM boot to wear as needed  Pt underwent R great toe bunionectomy on 8/3/20  Pt has increased pain when going up the steps, walking long distances, and running  Pt has started driving 2 weeks ago  Pt has been mainly sedentary due to fracture and bunionectomy  Pt has increased pain when squatting down to  objects from the floor  Pt has increased difficulty wearing shoes  Pt has increased difficulty transferring sit to stand to get off the toilet in the morning     21: Pt reports her feet do not feel too bad  Pt had B ankle pain when shoveling  Pt does not complaints in her feet with standing and walking activities  Pt had a history of having cortisone injections in her R knee with OA  Pt feels increased cracking in her knee and like her bone will come out of her leg  Pt has increased pain when transferring sit to stand to get out of a chair and standing in the kitchen for cooking  Pt needs to walk slowly in the grocery store  Pt fell about 4 times in the past 1 5 weeks  Pt lands on her knees and has increased difficulty getting up       Work: not working  Hobbies: watches TV, walking her dog  Gait: mild antalgic gait pattern, decreased push off R foot  Pain  At best pain ratin  At worst pain ratin  Location: R foot pain; L great toe: 0/10 best  0/10 worst; R knee best: 7/10 best  9/10 worst; L knee best 5/10best 7/10 worst  Quality: dull ache  Relieving factors: rest  Aggravating factors: stair climbing and walking    Social Support  Steps to enter house: yes  1  Stairs in house: yes 26  Lives in: multiple-level home    Employment status: not working  Treatments  Previous treatment: immobilization  Patient Goals  Patient goals for therapy: decreased pain and independence with ADLs/IADLs  Patient goal: to get back to normal; to have less pain going up the steps        Objective     Neurological Testing     Sensation     Knee   Left Knee   Intact: light touch    Right Knee   Intact: light touch     Ankle/Foot   Left Ankle/Foot   Intact: light touch    Right Ankle/Foot   Intact: light touch     Reflexes   Left   Patellar (L4): normal (2+)  Achilles (S1): normal (2+)    Right   Patellar (L4): normal (2+)  Achilles (S1): normal (2+)    Active Range of Motion     Right Knee   Flexion: 130 degrees   Extension: 0 degrees   Left Ankle/Foot   Dorsiflexion (ke): 4 degrees   Plantar flexion: WFL  Inversion: WFL  Eversion: WFL    Right Ankle/Foot   Dorsiflexion (ke): 0 degrees   Plantar flexion: WFL  Inversion: WFL  Eversion: WFL    Additional Active Range of Motion Details  (+) TTP R medial and lateral joint line, posterior knee  (+) TTP L medial joint line, posterior knee  HS flexibility: R: 45*  L: 50* with opposite knee flexed  (+) TTP L gastroc/ soleus complex  Decreased R great flexion and extension A/PROM  Increased pain with R first ray mobs  (+) TTP R great toe over surgical incision  No edema present upon visual inspection    Passive Range of Motion     Right Knee   Flexion: 133 degrees   Extension: 0 degrees with pain  Left Ankle/Foot    Dorsiflexion (ke): 4 degrees     Right Ankle/Foot    Dorsiflexion (ke): 0 degrees   Plantar flexion: WFL  Inversion: WFL  Eversion: WFL    Strength/Myotome Testing     Left Hip   Planes of Motion   Flexion: 4  Extension: 2+  Abduction: 4+  External rotation: 5  Internal rotation: 5    Right Hip   Planes of Motion   Flexion: 4  Extension: 2+  Abduction: 4  External rotation: 4+  Internal rotation: 4+    Left Knee   Flexion: 5 (pain)  Extension: 5    Right Knee Flexion: 4+ (pain)  Extension: 4+ (pain)    Left Ankle/Foot   Dorsiflexion: 4+  Plantar flexion: 3+  Inversion: 4+  Eversion: 4+    Right Ankle/Foot   Dorsiflexion: 4+  Plantar flexion: 3+  Inversion: 4+  Eversion: 4+            Dx: B knee pain/ OA  EPOC: 4/5/21  CO-MORBIDITIES: bipolar disorder, depression, chronic LBP  PERSONAL FACTORS: medicare insurance only  Precautions: none      Manuals             R knee PROM             R knee AP joint mobs             K tape for support                                       Neuro Re-Ed             Step ups FW 4"            sidestepping             Step ups lat 4"            Standing TKE             bridges                                       Ther Ex             bike             Standing HR             Stand gastroc stretch             Sit to stand             B Ankle TB 4 way              R toe curls             R great toe ext AROM             DLS: SLR toe straight             DLS; adductor ball squeeze             Quad sets             S/l hip abduction             LAQ                                       Modalities             Cp post tx

## 2021-02-24 ENCOUNTER — OFFICE VISIT (OUTPATIENT)
Dept: PODIATRY | Facility: CLINIC | Age: 61
End: 2021-02-24
Payer: MEDICARE

## 2021-02-24 VITALS — HEIGHT: 67 IN | WEIGHT: 195 LBS | BODY MASS INDEX: 30.61 KG/M2

## 2021-02-24 DIAGNOSIS — S92.412A CLOSED DISPLACED FRACTURE OF PROXIMAL PHALANX OF LEFT GREAT TOE, INITIAL ENCOUNTER: ICD-10-CM

## 2021-02-24 DIAGNOSIS — S82.64XA CLOSED NONDISPLACED FRACTURE OF LATERAL MALLEOLUS OF RIGHT FIBULA, INITIAL ENCOUNTER: Primary | ICD-10-CM

## 2021-02-24 PROCEDURE — 99213 OFFICE O/P EST LOW 20 MIN: CPT | Performed by: PODIATRIST

## 2021-02-24 NOTE — PROGRESS NOTES
PATIENT:  Frankey Sewer  1960       ASSESSMENT:     1  Closed nondisplaced fracture of lateral malleolus of right fibula, initial encounter     2  Closed displaced fracture of proximal phalanx of left great toe, initial encounter            PLAN:  1  Will continue PT/OT  Instructed supportive care and home exercise  2  Advance shoes and activity as tolerated  Continue toe spacers right foot with stretching exercise  3  Possible further images depending on the progress  4  RA in 3 months  Subjective:     HPI  The patient presents for follow-up on right ankle and left great toe fracture  Right ankle pain is much better  It may still bother her after walking for a while  Minimal pain left great toe  She did not go to PT until 2 days ago  No new pedal complaint, but she has knee pain  She was seen by Dr Stephan Edwards  The following portions of the patient's history were reviewed and updated as appropriate: allergies, current medications, past family history, past medical history, past social history, past surgical history and problem list   All pertinent labs and images were reviewed        Past Medical History  Past Medical History:   Diagnosis Date    Anxiety disorder     Bipolar 2 disorder ([address] )     Chronic back pain     Closed fracture of distal end of right fibula with routine healing 11/4/2020    CVA (cerebral vascular accident) ([address] )     noted on MRI in the past    Depression     GERD (gastroesophageal reflux disease)     Hypercholesteremia     Hypernatremia     Hypertension     Hypokalemia     Idiopathic chronic pancreatitis ([address] ) 3/20/2018    Intervertebral disc disorder with radiculopathy of lumbosacral region     resolved: 12/28/2015    Kidney disease     Panic attacks     Pericardial effusion     Radiculitis     resolved: 12/28/2015    Secondary renal hyperparathyroidism ([address] )     Vitamin D deficiency        Past Surgical History  Past Surgical History:   Procedure Laterality Date    BUNIONECTOMY      Left foot     COLON SURGERY      COLONOSCOPY  2018    DILATION AND CURETTAGE OF UTERUS      INDUCED       surgically induced    AR ANASTOMOSIS,AV,ANY SITE Left 2019    Procedure: CREATION FISTULA ARTERIOVENOUS (AV) left wrists possible left upper;  Surgeon: Alissa De Leon MD;  [facility];  Service: Vascular    AR [place] W/SESMDC W/DIST [place] Left 2019    Procedure: Duane Witt;  Surgeon: Franck Lyle DPM;  [facility];  Service: [address] W/SESMDC W/DIST [place] Right 8/3/2020    Procedure: Serge Ochoa;  Surgeon: Franck Lyle DPM;  [facility];  Service: Podiatry    AR ERCP DX COLLECTION SPECIMEN BRUSHING/WASHING N/A 2018    Procedure: ENDOSCOPIC RETROGRADE CHOLANGIOPANCREATOGRAPHY (ERCP);   Surgeon: Teresa Zaragoza MD;  [facility];  Service: Gastroenterology    AR LAP, SURG PROCTOPEXY N/A 2018    Procedure: ROBOTIC SIGMOID RESECTION / RECTOPEXY;  Surgeon: Kate Lal MD;  [facility];  Service: Colorectal    AR SIGMOIDOSCOPY FLX DX W/COLLJ SPEC BR/WA IF PFRMD N/A 2018    Procedure: SIGMOIDOSCOPY FLEXIBLE;  Surgeon: Kate Lal MD;  [facility];  Service: Colorectal    TUBAL LIGATION Bilateral 1997    US GUIDED THYROID BIOPSY  2019        Allergies:  Pollen extract    Medications:  Current Outpatient Medications   Medication Sig Dispense Refill    acetaminophen (TYLENOL) 325 mg tablet Take 650 mg by mouth every 6 (six) hours as needed for mild pain      AMILoride 5 mg tablet Take 1 tablet by mouth twice daily 120 tablet 0    aspirin (ECOTRIN LOW STRENGTH) 81 mg EC tablet Take 1 tablet (81 mg total) by mouth daily 30 tablet 0    atorvastatin (LIPITOR) 40 mg tablet Take 1 tablet (40 mg total) by mouth daily 30 tablet 11    budesonide-formoterol (SYMBICORT) 160-4 5 mcg/act inhaler Inhale 2 puffs 2 (two) times a day Rinse mouth after use  3 Inhaler 3    carvedilol (COREG) 3 125 mg tablet Take 2 tablets (6 25 mg total) by mouth 2 (two) times a day with meals 90 tablet 3    clonazePAM (KlonoPIN) 0 5 mg tablet Take 1 tablet (0 5 mg total) by mouth 3 (three) times a day 270 tablet 0    fluocinonide (LIDEX) 0 05 % ointment Apply topically 2 (two) times a day 60 g 1    fluvoxaMINE (LUVOX) 100 mg tablet 3 at bedtime      HYDROcodone-acetaminophen (NORCO) 5-325 mg per tablet Take 1 tablet by mouth every 6 (six) hours as needed for painMax Daily Amount: 4 tablets 20 tablet 0    lamoTRIgine (LaMICtal) 200 MG tablet Take 400 mg by mouth daily at bedtime       linaCLOtide (Linzess) 290 MCG CAPS Take 1 capsule by mouth daily 90 capsule 3    methylPREDNISolone 4 MG tablet therapy pack Use as directed on package 21 each 0    naloxone (NARCAN) 4 mg/0 1 mL nasal spray Administer 1 spray into a nostril  If breathing does not return to normal or if breathing difficulty resumes after 2-3 minutes, give another dose in the other nostril using a new spray  1 each 1    omeprazole (PriLOSEC) 20 mg delayed release capsule Take 1 capsule (20 mg total) by mouth daily at bedtime 90 capsule 3    ondansetron (ZOFRAN-ODT) 4 mg disintegrating tablet DISSOLVE 1 TABLET IN MOUTH EVERY 8 HOURS AS NEEDED FOR NAUSEA AND VOMITING 60 tablet 0    Polyethylene Glycol 3350 (MIRALAX PO) Take by mouth as needed       predniSONE 10 mg tablet Take 3 pills for 3 days, then 2 pills for 3 days, then 1 pill daily until competed   20 tablet 0    QUEtiapine (SEROquel) 100 mg tablet Take 1 tablet by mouth daily at bedtime 1 at bedtime in addition to 400 mg tab      QUEtiapine (SEROQUEL) 300 mg tablet Take 1 tablet (300 mg total) by mouth every morning 1 in the AM     ( also takes 400 mg at bedtime) 90 tablet 3    QUEtiapine (SEROquel) 400 MG tablet 1 at bedtime     (also takes 100 mg and 300 mg tabs)      traMADol (ULTRAM) 50 mg tablet Take 2 tabs by mouth twice daily 120 tablet 0     No current facility-administered medications for this visit  Social History:  Social History     Socioeconomic History    Marital status:      Spouse name: None    Number of children: None    Years of education: None    Highest education level: None   Occupational History    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   Social Needs    Financial resource strain: None    Food insecurity     Worry: None     Inability: None    Transportation needs     Medical: No     Non-medical: No   Tobacco Use    Smoking status: Never Smoker    Smokeless tobacco: Never Used   Substance and Sexual Activity    Alcohol use: Never     Frequency: Never     Binge frequency: Never    Drug use: Not Currently     Types: Marijuana    Sexual activity: Not Currently   Lifestyle    Physical activity     Days per week: 0 days     Minutes per session: 0 min    Stress: To some extent   Relationships    Social connections     Talks on phone: None     Gets together: None     Attends Orthodoxy service: None     Active member of club or organization: None     Attends meetings of clubs or organizations: None     Relationship status: None    Intimate partner violence     Fear of current or ex partner: No     Emotionally abused: No     Physically abused: No     Forced sexual activity: No   Other Topics Concern    None   Social History Narrative    Daily caffeine consumption 2-3 servings a day    Lives with family  No living will  Has dentures---no dental care  Primary language--English  Feels safe at home  Review of Systems   Constitutional: Negative for chills and fever  HENT: Negative for sore throat  Respiratory: Negative  Cardiovascular: Negative  Gastrointestinal: Negative  Neurological: Negative for weakness and numbness           Objective:      Ht 5' 7" (1 702 m)   Wt 88 5 kg (195 lb)   BMI 30 54 kg/m²          Physical Exam  Vitals signs reviewed  Constitutional:       General: She is not in acute distress  Appearance: Normal appearance  She is not diaphoretic  Cardiovascular:      Rate and Rhythm: Normal rate and regular rhythm  Pulses: Normal pulses  Comments: No cyanosis  CRT WNL all toes  Pulmonary:      Effort: Pulmonary effort is normal  No respiratory distress  Musculoskeletal: Normal range of motion  Right lower leg: No edema  Left lower leg: No edema  Comments: Right ankle edema resolved  Right ankle ROM intact  No restriction on right ankle ROM  Mild residual edema left great toe  No calf pain or swelling bilaterally  Skin:     General: Skin is dry  Capillary Refill: Capillary refill takes less than 2 seconds  Coloration: Skin is not cyanotic or mottled  Findings: No petechiae, rash or wound  Rash is not purpuric  Nails: There is no clubbing  Neurological:      General: No focal deficit present  Mental Status: She is alert and oriented to person, place, and time  Cranial Nerves: No cranial nerve deficit  Sensory: No sensory deficit  Psychiatric:         Mood and Affect: Mood normal          Behavior: Behavior normal          Thought Content:  Thought content normal          Judgment: Judgment normal

## 2021-02-25 ENCOUNTER — OFFICE VISIT (OUTPATIENT)
Dept: PHYSICAL THERAPY | Facility: CLINIC | Age: 61
End: 2021-02-25
Payer: MEDICARE

## 2021-02-25 DIAGNOSIS — M17.11 PRIMARY OSTEOARTHRITIS OF RIGHT KNEE: ICD-10-CM

## 2021-02-25 DIAGNOSIS — M22.2X2 PATELLOFEMORAL DISORDER OF LEFT KNEE: ICD-10-CM

## 2021-02-25 DIAGNOSIS — M20.11 HALLUX VALGUS, RIGHT: Primary | ICD-10-CM

## 2021-02-25 DIAGNOSIS — M46.1 BILATERAL SACROILIITIS (HCC): ICD-10-CM

## 2021-02-25 DIAGNOSIS — M22.2X1 PATELLOFEMORAL DISORDER OF RIGHT KNEE: ICD-10-CM

## 2021-02-25 PROCEDURE — 97010 HOT OR COLD PACKS THERAPY: CPT

## 2021-02-25 PROCEDURE — 97110 THERAPEUTIC EXERCISES: CPT

## 2021-02-25 PROCEDURE — 97140 MANUAL THERAPY 1/> REGIONS: CPT

## 2021-02-25 PROCEDURE — 97112 NEUROMUSCULAR REEDUCATION: CPT

## 2021-02-26 ENCOUNTER — TELEPHONE (OUTPATIENT)
Dept: GASTROENTEROLOGY | Facility: CLINIC | Age: 61
End: 2021-02-26

## 2021-02-26 RX ORDER — TRAMADOL HYDROCHLORIDE 50 MG/1
TABLET ORAL
Qty: 120 TABLET | Refills: 0 | Status: SHIPPED | OUTPATIENT
Start: 2021-02-26 | End: 2021-03-24 | Stop reason: SDUPTHER

## 2021-02-26 NOTE — TELEPHONE ENCOUNTER
Pt left  mssg asking how to get more Linzess?; has 7 left/thinks she threw a bottle away  Call from [phone]

## 2021-03-01 ENCOUNTER — OFFICE VISIT (OUTPATIENT)
Dept: PHYSICAL THERAPY | Facility: CLINIC | Age: 61
End: 2021-03-01
Payer: MEDICARE

## 2021-03-01 DIAGNOSIS — M17.11 PRIMARY OSTEOARTHRITIS OF RIGHT KNEE: Primary | ICD-10-CM

## 2021-03-01 DIAGNOSIS — M22.2X1 PATELLOFEMORAL DISORDER OF RIGHT KNEE: ICD-10-CM

## 2021-03-01 PROCEDURE — 97140 MANUAL THERAPY 1/> REGIONS: CPT

## 2021-03-01 PROCEDURE — 97110 THERAPEUTIC EXERCISES: CPT

## 2021-03-01 PROCEDURE — 97112 NEUROMUSCULAR REEDUCATION: CPT

## 2021-03-01 NOTE — PROGRESS NOTES
Daily Note     Today's date: 3/1/2021  Patient name: Ayleen Grayson  : 1960  MRN: [id number]  Referring provider: Winfred Baumgarten, DPM  Dx:   Encounter Diagnosis     ICD-10-CM    1  Primary osteoarthritis of right knee  M17 11    2  Patellofemoral disorder of right knee  M22 2X1        Start Time: 1115  Stop Time: 1208  Total time in clinic (min): 53 minutes    Subjective: Pt reports soreness and slight pain in the R knee from LV and from the weather  Pt reports having a visit last week w/ the podiatrist and will revisit again in 3 mo  Objective: See treatment diary below      Assessment: Tolerated treatment well  Pt demonstrates motivation to progress and improve  Reminded pt to progress safely and when necessary  Pt had difficulty w/ sit>stand  Patient demonstrated fatigue post treatment and would benefit from continued PT to increase ROM and strength  Plan: Continue per plan of care  Dx: B knee pain/ OA  EPOC: 21  CO-MORBIDITIES: bipolar disorder, depression, chronic LBP  PERSONAL FACTORS: medicare insurance only  Precautions: none      Manuals 2/25 3/1           R knee PROM nk nk           R knee AP joint mobs             K tape for support              10' 10'                        Neuro Re-Ed             Step ups FW 4"x10 4"x10           sidestepping 10 10           Step ups lat 4"x10 4"x10           Standing TKE [place] 10x OTB 10x           bridges 10x5" 10x5"                                     Ther Ex             bike 6' 6'           Standing HR 20 20           Stand gastroc stretch 3x20" 3x20"           Sit to stand  8 p!              B Ankle TB 4 way  15x ea R ankle TB 4 way OTB           R toe curls             R great toe ext AROM             DLS: SLR toe straight 10 10           DLS; adductor ball squeeze 10x10" 10x10"           Quad sets             S/l hip abduction             LAQ 10x10" np                                     Modalities             Cp post tx 10' 8'

## 2021-03-03 ENCOUNTER — TELEPHONE (OUTPATIENT)
Dept: FAMILY MEDICINE CLINIC | Facility: HOSPITAL | Age: 61
End: 2021-03-03

## 2021-03-03 NOTE — TELEPHONE ENCOUNTER
Patient contacted regarding plasma donation for Covid19 patients  Patient declined plasma donation at this time

## 2021-03-04 ENCOUNTER — OFFICE VISIT (OUTPATIENT)
Dept: PHYSICAL THERAPY | Facility: CLINIC | Age: 61
End: 2021-03-04
Payer: MEDICARE

## 2021-03-04 DIAGNOSIS — M17.11 PRIMARY OSTEOARTHRITIS OF RIGHT KNEE: Primary | ICD-10-CM

## 2021-03-04 DIAGNOSIS — M22.2X2 PATELLOFEMORAL DISORDER OF LEFT KNEE: ICD-10-CM

## 2021-03-04 DIAGNOSIS — M22.2X1 PATELLOFEMORAL DISORDER OF RIGHT KNEE: ICD-10-CM

## 2021-03-04 PROCEDURE — 97140 MANUAL THERAPY 1/> REGIONS: CPT | Performed by: PHYSICAL THERAPIST

## 2021-03-04 PROCEDURE — 97110 THERAPEUTIC EXERCISES: CPT | Performed by: PHYSICAL THERAPIST

## 2021-03-04 NOTE — PROGRESS NOTES
Daily Note     Today's date: 3/4/2021  Patient name: Vasu Jones  : 1960  MRN: [id number]  Referring provider: Rabia Overton DPM  Dx:   Encounter Diagnosis     ICD-10-CM    1  Primary osteoarthritis of right knee  M17 11    2  Patellofemoral disorder of right knee  M22 2X1    3  Patellofemoral disorder of left knee  M22 2X2                   Subjective: 6/10 knee pain currently      Objective: See treatment diary below      Assessment: Tolerated treatment well  Patient was able to transfer sit to stand from the reforrmer with less pain  Good relief with K tape      Plan: Continue per plan of care        Dx: B knee pain/ OA  EPOC: 21  CO-MORBIDITIES: bipolar disorder, depression, chronic LBP  PERSONAL FACTORS: medicare insurance only  Precautions: none      Manuals 2/25 3/1 3          R knee PROM nk nk th          R knee AP joint mobs   th          K tape for support   th           10' 10' 10'                       Neuro Re-Ed             Step ups FW 4"x10 4"x10 4" x10          sidestepping 10 10 3 laps          Step ups lat 4"x10 4"x10 4"x10          Standing TKE [place] 10x OTB 10x GTB x10 B          bridges 10x5" 10x5" 10x5"                                    Ther Ex             bike 6' 6' 6'          Standing HR 20 20 20          Stand gastroc stretch 3x20" 3x20" 1' B          Sit to stand  8 p!   x10 reformer          B Ankle TB 4 way  15x ea R ankle TB 4 way OTB           R toe curls             R great toe ext AROM             DLS: SLR toe straight 10 10           DLS; adductor ball squeeze 10x10" 10x10"           Quad sets   10x10"          S/l hip abduction   10          LAQ 10x10" np                                     Modalities             Cp post tx 10' 8'

## 2021-03-08 ENCOUNTER — OFFICE VISIT (OUTPATIENT)
Dept: PHYSICAL THERAPY | Facility: CLINIC | Age: 61
End: 2021-03-08
Payer: MEDICARE

## 2021-03-08 DIAGNOSIS — M17.11 PRIMARY OSTEOARTHRITIS OF RIGHT KNEE: Primary | ICD-10-CM

## 2021-03-08 PROCEDURE — 97110 THERAPEUTIC EXERCISES: CPT

## 2021-03-08 PROCEDURE — 97140 MANUAL THERAPY 1/> REGIONS: CPT

## 2021-03-08 NOTE — PROGRESS NOTES
Daily Note     Today's date: 3/8/2021  Patient name: Devonte Mcmahan  : 1960  MRN: [id number]  Referring provider: Talita Denton DPM  Dx:   Encounter Diagnosis     ICD-10-CM    1  Primary osteoarthritis of right knee  M17 11                   Subjective: Pt reports the L knee has been hurting over the weekend  Pt states the R is getting better  Objective: See treatment diary below      Assessment: Tolerated treatment fair w/ c/o's pain w/ step ups  Patient would benefit from continued PT for cont'd LE strengthening to support the knee jt  Plan: Continue per plan of care  Progress treatment as tolerated  Dx: B knee pain/ OA  EPOC: 21  CO-MORBIDITIES: bipolar disorder, depression, chronic LBP  PERSONAL FACTORS: medicare insurance only  Precautions: none      Manuals 2/25 3/1 3/4 3/8         R knee PROM nk nk th JK L/R         R knee AP joint mobs   th          K tape for support   th JK L/R          10' 10' 10' 10'                      Neuro Re-Ed             Step ups FW 4"x10 4"x10 4" x10 4" 10         sidestepping 10 10 3 laps  3 laps         Step ups lat 4"x10 4"x10 4"x10 6" x10 p!          Standing TKE [place] 10x OTB 10x GTB x10 B GTB x10 B         bridges 10x5" 10x5" 10x5"                                    Ther Ex             bike 6' 6' 6' 5'         Standing HR 20 20 20 20         Stand gastroc stretch 3x20" 3x20" 1' B 1' B         Sit to stand  8 p!   x10 reformer 10x chair         B Ankle TB 4 way  15x ea R ankle TB 4 way OTB           R toe curls             R great toe ext AROM             DLS: SLR toe straight 10 10  15         DLS; adductor ball squeeze 10x10" 10x10"           Quad sets   10x10" nv         S/l hip abduction   10 nv         LAQ 10x10" np                                     Modalities             Cp post tx 10' 8'

## 2021-03-08 NOTE — TELEPHONE ENCOUNTER
Patient called asking if the [employer] shipment has been received by our office? It has not; staff will be on the look out and contact patient once received

## 2021-03-10 ENCOUNTER — OFFICE VISIT (OUTPATIENT)
Dept: PHYSICAL THERAPY | Facility: CLINIC | Age: 61
End: 2021-03-10
Payer: MEDICARE

## 2021-03-10 DIAGNOSIS — M22.2X2 PATELLOFEMORAL DISORDER OF LEFT KNEE: ICD-10-CM

## 2021-03-10 DIAGNOSIS — M17.11 PRIMARY OSTEOARTHRITIS OF RIGHT KNEE: Primary | ICD-10-CM

## 2021-03-10 DIAGNOSIS — M22.2X1 PATELLOFEMORAL DISORDER OF RIGHT KNEE: ICD-10-CM

## 2021-03-10 PROCEDURE — 97140 MANUAL THERAPY 1/> REGIONS: CPT | Performed by: PHYSICAL THERAPIST

## 2021-03-10 PROCEDURE — 97110 THERAPEUTIC EXERCISES: CPT | Performed by: PHYSICAL THERAPIST

## 2021-03-11 ENCOUNTER — OFFICE VISIT (OUTPATIENT)
Dept: OBGYN CLINIC | Facility: CLINIC | Age: 61
End: 2021-03-11
Payer: MEDICARE

## 2021-03-11 VITALS
DIASTOLIC BLOOD PRESSURE: 84 MMHG | SYSTOLIC BLOOD PRESSURE: 150 MMHG | BODY MASS INDEX: 34.21 KG/M2 | TEMPERATURE: 99.7 F | HEIGHT: 67 IN | WEIGHT: 218 LBS

## 2021-03-11 DIAGNOSIS — M17.11 PRIMARY OSTEOARTHRITIS OF RIGHT KNEE: ICD-10-CM

## 2021-03-11 DIAGNOSIS — M22.2X1 RIGHT PATELLOFEMORAL SYNDROME: ICD-10-CM

## 2021-03-11 DIAGNOSIS — M22.2X2 PATELLOFEMORAL SYNDROME OF LEFT KNEE: Primary | ICD-10-CM

## 2021-03-11 DIAGNOSIS — M76.892 LEFT KNEE TENDONITIS: ICD-10-CM

## 2021-03-11 PROCEDURE — 99213 OFFICE O/P EST LOW 20 MIN: CPT | Performed by: ORTHOPAEDIC SURGERY

## 2021-03-11 NOTE — PROGRESS NOTES
Assessment:     1  Patellofemoral syndrome of left knee    2  Right patellofemoral syndrome    3  Primary osteoarthritis of right knee    4  Left knee tendonitis        Plan:     Problem List Items Addressed This Visit        Musculoskeletal and Integument    Primary osteoarthritis of right knee    Patellofemoral syndrome of left knee - Primary    Right patellofemoral syndrome    Left knee tendonitis    Relevant Orders    MRI knee left  wo contrast           The patient was seen and examined by Dr Marvin Ordonez and myself  Findings consistent with right knee osteoarthritis with effusion, bilateral knee patellofemoral syndrome and acute left knee pain/ possible tendinitis verses lateral meniscus tear  Cortisone injections did help the anterior knee pain and swelling, which she continues to have mechanical symptoms in the left knee  Recommend MRI of the left knee at this time to further evaluate the joint  Follow-up with MRI results and Dr Marvin Ordonez will go over further treatment recommendations at that time  All patient's questions were answered to her satisfaction  This note is created using dictation transcription  It may contain typographical errors, grammatical errors, improperly dictated words, background noise and other errors  Subjective:     Patient ID: Jose Jacinto is a 64 y o  female  Chief Complaint: This is a 57-year-old white female following up for bilateral knee osteoarthritis and patellofemoral syndrome  At last visit 1 month ago patient received bilateral knee cortisone injections of the right knee was aspirated  She states she did feel some relief after the aspiration, but no significant relief in pain from the cortisone injections  She attended 6 sessions of physical therapy so far  She continues to have pain over the anterior knee on the right side, but complains of mostly posterior pain on the left side that radiates up to the thigh   She denies any lumbar spine pain     Allergy:  Allergies   Allergen Reactions    Pollen Extract Nasal Congestion     Medications:  all current active meds have been reviewed  Past Medical History:  Past Medical History:   Diagnosis Date    Anxiety disorder     B[facility] 2 disorder [facility]     Chronic back pain     Closed fracture of distal end of right fibula with routine healing 2020    CVA (cerebral vascular accident) ([facility] )     noted on MRI in the past    Depression     GERD (gastroesophageal reflux disease)     Hypercholesteremia     Hypernatremia     Hypertension     Hypokalemia     Idiopathic chronic pancreatitis ([facility] ) 3/20/2018    Intervertebral disc disorder with radiculopathy of lumbosacral region     resolved: 2015    Kidney disease     Panic attacks     Pericardial effusion     Radiculitis     resolved: 2015    Secondary renal hyperparathyroidism ([facility] )     Vitamin D deficiency      Past Surgical History:  Past Surgical History:   Procedure Laterality Date    BUNIONECTOMY      Left foot     COLON SURGERY      COLONOSCOPY  2018    DILATION AND CURETTAGE OF UTERUS      INDUCED       surgically induced    MN ANASTOMOSIS,AV,ANY SITE Left 2019    Procedure: CREATION FISTULA ARTERIOVENOUS (AV) left wrists possible left upper;  Surgeon: Malka Apley, MD;  [facility];  Service: Vascular    MN CORRJ HALLUX VALGUS W/SESMDC W/DIST Ban Snare Left 2019    Procedure: Mariaelena Board;  Surgeon: Mckayla Gonzales DPM;  [facility];  Service: Podiatry    MN [place] W/SESMDC W/DIST Ban Snare Right 8/3/2020    Procedure: Erica White;  Surgeon: Mckayla Goznales DPM;  [facility];  Service: Podiatry    MN ERCP DX COLLECTION SPECIMEN BRUSHING/WASHING N/A 2018    Procedure: ENDOSCOPIC RETROGRADE CHOLANGIOPANCREATOGRAPHY (ERCP);   Surgeon: Ijeoma Marsh MD;  [facility];  Service: Gastroenterology    MN LAP, SURG PROCTOPEXY N/A 7/13/2018    Procedure: ROBOTIC SIGMOID RESECTION / RECTOPEXY;  Surgeon: Sudha Muniz MD;  [facility];  Service: Colorectal    OR SIGMOIDOSCOPY FLX DX W/COLLJ SPEC BR/WA IF PFRMD N/A 7/13/2018    Procedure: Thomas Dye;  Surgeon: Sudha Muniz MD;  [facility];  Service: Colorectal    TUBAL LIGATION Bilateral 1997    US GUIDED THYROID BIOPSY  7/30/2019     Family History:  Family History   Problem Relation Age of Onset    Bipolar disorder Mother     Mental illness Mother         depression    Stroke Mother     Dementia Mother     Colon polyps Mother     Heart disease Father     Hypertension Father     Diabetes Father     Other Family         Back disorder    Diabetes Family     Heart disease Family     Hypertension Family     Breast cancer Family     Stroke Family     Thyroid disease Family     Breast cancer Paternal Grandmother     Breast cancer Paternal [de-identified]     Breast cancer Maternal Aunt     Mental illness Sister     Colon polyps Sister     Mental illness Sister     Heart disease Sister     No Known Problems Sister     No Known Problems Sister     Other Son         pituitary tumor    Hypertension Son     Obesity Son     No Known Problems Son     Substance Abuse Neg Hx         neg fam hx    Colon cancer Neg Hx      Social History:  Social History     Substance and Sexual Activity   Alcohol Use Never    Frequency: Never    Binge frequency: Never     Social History     Substance and Sexual Activity   Drug Use Not Currently    Types: Marijuana     Social History     Tobacco Use   Smoking Status Never Smoker   Smokeless Tobacco Never Used     Review of Systems   Constitutional: Negative  HENT: Negative  Eyes: Negative  Respiratory: Negative  Cardiovascular: Negative  Gastrointestinal: Negative  Endocrine: Negative  Genitourinary: Negative  Musculoskeletal: Positive for arthralgias (Bilateral knee) and myalgias     Skin: Negative  Allergic/Immunologic: Negative  Neurological: Negative  Hematological: Negative  Psychiatric/Behavioral: Negative  Objective:  BP Readings from Last 1 Encounters:   03/11/21 150/84      Wt Readings from Last 1 Encounters:   03/11/21 98 9 kg (218 lb)      BMI:   Estimated body mass index is 34 14 kg/m² as calculated from the following:    Height as of this encounter: 5' 7" (1 702 m)  Weight as of this encounter: 98 9 kg (218 lb)  BSA:   Estimated body surface area is 2 1 meters squared as calculated from the following:    Height as of this encounter: 5' 7" (1 702 m)  Weight as of this encounter: 98 9 kg (218 lb)  Physical Exam  Vitals signs and nursing note reviewed  Constitutional:       Appearance: Normal appearance  She is well-developed  HENT:      Head: Normocephalic and atraumatic  Right Ear: External ear normal       Left Ear: External ear normal    Eyes:      Extraocular Movements: Extraocular movements intact  Conjunctiva/sclera: Conjunctivae normal    Neck:      Musculoskeletal: Neck supple  Pulmonary:      Effort: Pulmonary effort is normal    Musculoskeletal:      Right knee: She exhibits effusion (trace)  Left knee: She exhibits no effusion  Skin:     General: Skin is warm and dry  Neurological:      Mental Status: She is alert and oriented to person, place, and time  Deep Tendon Reflexes: Reflexes are normal and symmetric  Psychiatric:         Mood and Affect: Mood normal          Behavior: Behavior normal        Right Knee Exam     Muscle Strength   The patient has normal right knee strength  Tenderness   Right knee tenderness location: Diffuse anteriorly  Range of Motion   The patient has normal right knee ROM      Tests   Kateryna:  Medial - negative Lateral - negative  Varus: negative Valgus: negative  Patellar apprehension: negative    Other   Erythema: absent  Scars: absent  Sensation: normal  Pulse: present  Swelling: mild  Effusion: effusion (trace) present    Comments:    Patellofemoral joint crepitation knee motion      Left Knee Exam     Muscle Strength   The patient has normal left knee strength  Tenderness   Left knee tenderness location: diffuse posteriorly, distal hamstring, lateral epicondyle femur  Range of Motion   The patient has normal left knee ROM  Tests   Kateryna:  Medial - negative Lateral - positive  Varus: negative Valgus: negative  Patellar apprehension: negative    Other   Erythema: absent  Scars: absent  Sensation: normal  Pulse: present  Swelling: mild  Effusion: no effusion present    Comments:    Patellofemoral joint crepitation knee motion            No new imaging       Procedures

## 2021-03-15 ENCOUNTER — APPOINTMENT (OUTPATIENT)
Dept: PHYSICAL THERAPY | Facility: CLINIC | Age: 61
End: 2021-03-15
Payer: MEDICARE

## 2021-03-15 VITALS — BODY MASS INDEX: 34.21 KG/M2 | WEIGHT: 218 LBS | HEIGHT: 67 IN

## 2021-03-15 DIAGNOSIS — D12.6 BENIGN NEOPLASM OF COLON, UNSPECIFIED PART OF COLON: Primary | ICD-10-CM

## 2021-03-15 RX ORDER — SODIUM PICOSULFATE, MAGNESIUM OXIDE, AND ANHYDROUS CITRIC ACID 10; 3.5; 12 MG/160ML; G/160ML; G/160ML
LIQUID ORAL
Qty: 2 BOTTLE | Refills: 0 | Status: SHIPPED | COMMUNITY
Start: 2021-03-15 | End: 2021-03-22 | Stop reason: HOSPADM

## 2021-03-15 NOTE — TELEPHONE ENCOUNTER
Why does your doctor want you to have this procedure? Hx  Colon polyps    Do you have kidney disease?  yes  - Stage IV If yes, are you on dialysis :     Have you had diverticulitis within the past 2 months? no    Are you diabetic?  no  If yes, insulin dependent: If yes, provide diabetic instructions sheet     Do take iron supplements?  no  If yes, instruct patient to hold iron supplement for 7 days prior    Are you on a blood thinner? no   Was the blood thinner sheet complete and faxed to cardiologist no  Plavix (clopidogrel), Coumadin (warfarin), Lovenox (enoxaparin), Xarelto (rivaroxaban), Pradaxa(dabigatran), Eliquis(apixaban) Savaysa/Lixiana (edoxapan)    Do you have an automatic implantable cardiac defibrillator (AICD)/pacemaker ([facility])? no  Was AICD/pacemaker sheet completed and faxed to cardiologist? no    Are you on home oxygen? no  If yes, continuous or nocturnal:     Have you been treated for MRSA, VRE or any communicable diseases? yes    Heart attack, stroke, or stent within 3 months? no  Schedule at Hospital if within 3-6 months   Use nitroglycerin for chest pain in the last 6 months? no    History of organ  transplant?  no   If yes, notify Endo      History of neck/throat/tongue surgery or cancer? no  IF yes, notify Endo      Any problems with anesthesia in the past?  Yes, nausea    Was stool C diff ordered?  no Stool specimen needs to be completed prior to procedure    Do have any facial or body piercings?no     Do you have a latex allergy? no     Do have an allergy to metals? (Bravo study only) no     If pediatric patient, was consent faxed to pediatrician no     Patient rights reviewed yes    Rx Clenpiq sent to provider for signature  Instructions emailed to patient  Sample at  for

## 2021-03-18 ENCOUNTER — APPOINTMENT (OUTPATIENT)
Dept: PHYSICAL THERAPY | Facility: CLINIC | Age: 61
End: 2021-03-18
Payer: MEDICARE

## 2021-03-22 ENCOUNTER — HOSPITAL ENCOUNTER (OUTPATIENT)
Dept: GASTROENTEROLOGY | Facility: AMBULATORY SURGERY CENTER | Age: 61
Discharge: HOME | End: 2021-03-22
Payer: MEDICARE

## 2021-03-22 ENCOUNTER — APPOINTMENT (OUTPATIENT)
Dept: PHYSICAL THERAPY | Facility: CLINIC | Age: 61
End: 2021-03-22
Payer: MEDICARE

## 2021-03-22 ENCOUNTER — ANESTHESIA EVENT (OUTPATIENT)
Dept: GASTROENTEROLOGY | Facility: AMBULATORY SURGERY CENTER | Age: 61
End: 2021-03-22

## 2021-03-22 ENCOUNTER — ANESTHESIA (OUTPATIENT)
Dept: GASTROENTEROLOGY | Facility: AMBULATORY SURGERY CENTER | Age: 61
End: 2021-03-22

## 2021-03-22 VITALS
RESPIRATION RATE: 18 BRPM | OXYGEN SATURATION: 97 % | TEMPERATURE: 98.7 F | DIASTOLIC BLOOD PRESSURE: 68 MMHG | HEART RATE: 93 BPM | SYSTOLIC BLOOD PRESSURE: 142 MMHG

## 2021-03-22 DIAGNOSIS — Z86.010 HISTORY OF COLON POLYPS: ICD-10-CM

## 2021-03-22 PROBLEM — R11.2 PONV (POSTOPERATIVE NAUSEA AND VOMITING): Status: ACTIVE | Noted: 2021-03-22

## 2021-03-22 PROBLEM — Z98.890 PONV (POSTOPERATIVE NAUSEA AND VOMITING): Status: ACTIVE | Noted: 2021-03-22

## 2021-03-22 PROCEDURE — 45385 COLONOSCOPY W/LESION REMOVAL: CPT | Performed by: INTERNAL MEDICINE

## 2021-03-22 PROCEDURE — 88305 TISSUE EXAM BY PATHOLOGIST: CPT | Performed by: PATHOLOGY

## 2021-03-22 RX ORDER — PROPOFOL 10 MG/ML
INJECTION, EMULSION INTRAVENOUS AS NEEDED
Status: DISCONTINUED | OUTPATIENT
Start: 2021-03-22 | End: 2021-03-22

## 2021-03-22 RX ORDER — SODIUM CHLORIDE 9 MG/ML
50 INJECTION, SOLUTION INTRAVENOUS CONTINUOUS
Status: DISCONTINUED | OUTPATIENT
Start: 2021-03-22 | End: 2021-03-26 | Stop reason: HOSPADM

## 2021-03-22 RX ADMIN — PROPOFOL 50 MG: 10 INJECTION, EMULSION INTRAVENOUS at 09:43

## 2021-03-22 RX ADMIN — PROPOFOL 50 MG: 10 INJECTION, EMULSION INTRAVENOUS at 09:51

## 2021-03-22 RX ADMIN — PROPOFOL 50 MG: 10 INJECTION, EMULSION INTRAVENOUS at 09:46

## 2021-03-22 RX ADMIN — PROPOFOL 50 MG: 10 INJECTION, EMULSION INTRAVENOUS at 09:33

## 2021-03-22 RX ADMIN — PROPOFOL 50 MG: 10 INJECTION, EMULSION INTRAVENOUS at 09:39

## 2021-03-22 RX ADMIN — PROPOFOL 100 MG: 10 INJECTION, EMULSION INTRAVENOUS at 09:29

## 2021-03-22 RX ADMIN — PROPOFOL 50 MG: 10 INJECTION, EMULSION INTRAVENOUS at 09:30

## 2021-03-22 RX ADMIN — SODIUM CHLORIDE 50 ML/HR: 9 INJECTION, SOLUTION INTRAVENOUS at 09:20

## 2021-03-22 RX ADMIN — PROPOFOL 50 MG: 10 INJECTION, EMULSION INTRAVENOUS at 09:36

## 2021-03-22 NOTE — H&P
History and Physical - [facility]  Alejandro Galvan 64 y o  female MRN: [id number]    HPI: Alejandro Galvan is a 64y o  year old female who presents for  Colonoscopy for screening    REVIEW OF SYSTEMS: Per the HPI, and otherwise unremarkable      Historical Information   Past Medical History:   Diagnosis Date    Anxiety     Anxiety disorder     Bipolar 2 disorder ([facility] )     Chronic back pain     Closed fracture of distal end of right fibula with routine healing 2020    COVID-19     in 2021    CVA [facility])     noted on MRI in the past    Depression     GERD (gastroesophageal reflux disease)     Hypercholesteremia     Hypernatremia     Hypertension     Hypokalemia     Idiopathic chronic pancreatitis ([facility] ) 3/20/2018    Intervertebral disc disorder with radiculopathy of lumbosacral region     resolved: 2015    Kidney disease     Panic attacks     Pericardial effusion     Radiculitis     resolved: 2015    Secondary renal hyperparathyroidism ([place] [address] )     Vitamin D deficiency      Past Surgical History:   Procedure Laterality Date    BUNIONECTOMY      Left foot     COLON SURGERY      COLONOSCOPY  2018    DILATION AND CURETTAGE OF UTERUS      INDUCED       surgically induced    SD ANASTOMOSIS,AV,ANY SITE Left 2019    Procedure: CREATION FISTULA ARTERIOVENOUS (AV) left wrists possible left upper;  Surgeon: Carmen Urbano MD;  [facility];  Service: Vascular    SD CORRJ HALLUX VALGUS W/SESMDC W/DIST METAR OSTEOT Left 2019    Procedure: Rosalina Mittal;  Surgeon: Kirt Maria DPM;  [facility];  Service: Podiatry    SD [place] W/SESMDC W/DIST Blue Daring Right 8/3/2020    Procedure: Lien Sierra;  Surgeon: Kirt Maria DPM;  [facility];  Service: Podiatry    SD ERCP DX COLLECTION SPECIMEN BRUSHING/WASHING N/A 2018    Procedure: ENDOSCOPIC RETROGRADE CHOLANGIOPANCREATOGRAPHY (ERCP);   Surgeon: Clayton Berg MD;  [facility];  Service: Gastroenterology    DC LAP, SURG PROCTOPEXY N/A 7/13/2018    Procedure: ROBOTIC SIGMOID RESECTION / RECTOPEXY;  Surgeon: Kirit Avalos MD;  [facility];  Service: Colorectal    DC SIGMOIDOSCOPY FLX DX W/COLLJ SPEC BR/WA IF PFRMD N/A 7/13/2018    Procedure: Kane Bautista;  Surgeon: Kirit Avalos MD;  [facility];  Service: Colorectal    TUBAL LIGATION Bilateral 57 [facility] THYROID BIOPSY  7/30/2019     Social History   Social History     Substance and Sexual Activity   Alcohol Use Never    Frequency: Never    Binge frequency: Never     Social History     Substance and Sexual Activity   Drug Use Not Currently    Types: Marijuana     Social History     Tobacco Use   Smoking Status Never Smoker   Smokeless Tobacco Never Used     Family History   Problem Relation Age of Onset    Bipolar disorder Mother     Mental illness Mother         depression    Stroke Mother     Dementia Mother     Colon polyps Mother     Heart disease Father     Hypertension Father     Diabetes Father     Other Family         Back disorder    Diabetes Family     Heart disease Family     Hypertension Family     Breast cancer Family     Stroke Family     Thyroid disease Family     Breast cancer Paternal Grandmother     Breast cancer Paternal [de-identified]     Breast cancer Maternal Aunt     Mental illness Sister     Colon polyps Sister     Mental illness Sister     Heart disease Sister     No Known Problems Sister     No Known Problems Sister     Other Son         pituitary tumor    Hypertension Son     Obesity Son     No Known Problems Son     Substance Abuse Neg Hx         neg fam hx    Colon cancer Neg Hx        Meds/Allergies       Current Outpatient Medications:     acetaminophen (TYLENOL) 325 mg tablet    AMILoride 5 mg tablet    aspirin (ECOTRIN LOW STRENGTH) 81 mg EC tablet   carvedilol (COREG) 3 125 mg tablet    clonazePAM (KlonoPIN) 0 5 mg tablet    fluvoxaMINE (LUVOX) 100 mg tablet    lamoTRIgine (LaMICtal) 200 MG tablet    omeprazole (PriLOSEC) 20 mg delayed release capsule    ondansetron (ZOFRAN-ODT) 4 mg disintegrating tablet    Polyethylene Glycol 3350 (MIRALAX PO)    QUEtiapine (SEROquel) 100 mg tablet    QUEtiapine (SEROQUEL) 300 mg tablet    QUEtiapine (SEROquel) 400 MG tablet    Sod Picosulfate-Mag Ox-Cit Acd (Clenpiq) 10-3 5-12 MG-GM -GM/160ML SOLN    traMADol (ULTRAM) 50 mg tablet    atorvastatin (LIPITOR) 40 mg tablet    budesonide-formoterol (SYMBICORT) 160-4 5 mcg/act inhaler    fluocinonide (LIDEX) 0 05 % ointment    HYDROcodone-acetaminophen (NORCO) 5-325 mg per tablet    linaCLOtide (Linzess) 290 MCG CAPS    methylPREDNISolone 4 MG tablet therapy pack    naloxone (NARCAN) 4 mg/0 1 mL nasal spray    predniSONE 10 mg tablet    Current Facility-Administered Medications:     sodium chloride 0 9 % infusion, 50 mL/hr, Intravenous, Continuous, Continue from Pre-op at 03/22/21 3615    Facility-Administered Medications Ordered in Other Encounters:     propofol (DIPRIVAN) 200 MG/20ML bolus injection, , Intravenous, PRN, 50 mg at 03/22/21 0243    Allergies   Allergen Reactions    Pollen Extract Nasal Congestion       Objective     /79   Pulse 100   Temp 98 7 °F (37 1 °C) (Temporal)   Resp (!) 28   SpO2 97%     PHYSICAL EXAM    Gen: NAD AAOx3  CV: S1S2 RRR no m/r/g  CHEST: Clear b/l no c/r/w  ABD: soft, +BS NT/ND  EXT: no edema    ASSESSMENT/PLAN:  This is a 64y o  year old female here for  colonoscopy, and she is stable and optimized for her procedure

## 2021-03-22 NOTE — ANESTHESIA PREPROCEDURE EVALUATION
Procedure:  COLONOSCOPY    Relevant Problems   ANESTHESIA   (+) PONV (postoperative nausea and vomiting)      CARDIO   (+) Cardiac murmur   (+) Essential hypertension   (+) Hypercholesteremia      ENDO   (+) Hyperparathyroidism (HCC)   (+) Secondary renal hyperparathyroidism (HCC)      GI/HEPATIC   (+) Idiopathic chronic pancreatitis (HCC)      /RENAL   (+) CKD (chronic kidney disease) stage 4, GFR 15-29 ml/min (HCC)   (+) Renal cyst      MUSCULOSKELETAL   (+) Bilateral sacroiliitis (HCC)   (+) Primary osteoarthritis of right knee      NEURO/PSYCH   (+) Obsessive compulsive disorder   (+) Panic disorder with agoraphobia      PULMONARY   (+) Moderate persistent asthma without complication        Physical Exam    Airway    Mallampati score: I  TM Distance: >3 FB  Neck ROM: full     Dental   lower dentures and upper dentures,     Cardiovascular  Cardiovascular exam normal    Pulmonary  Pulmonary exam normal     Other Findings        Anesthesia Plan  ASA Score- 3     Anesthesia Type- IV sedation with anesthesia with ASA Monitors  Additional Monitors:   Airway Plan:           Plan Factors-    Chart reviewed  Patient summary reviewed  Patient is not a current smoker  Induction- intravenous  Postoperative Plan-     Informed Consent- Anesthetic plan and risks discussed with patient

## 2021-03-22 NOTE — DISCHARGE INSTRUCTIONS
Hemorrhoids   WHAT YOU NEED TO KNOW:   What are hemorrhoids? Hemorrhoids are swollen blood vessels inside your rectum (internal hemorrhoids) or on your anus (external hemorrhoids)  Sometimes a hemorrhoid may prolapse  This means it extends out of your anus  What increases my risk for hemorrhoids? · Pregnancy or obesity    · Straining or sitting for a long time during bowel movements    · Liver disease    · Weak muscles around the anus caused by older age, rectal surgery, or anal intercourse    · A lack of physical activity    · Chronic diarrhea or constipation    · A low-fiber diet    What are the signs and symptoms of hemorrhoids? · Pain or itching around your anus or inside your rectum    · Swelling or bumps around your anus    · Bright red blood in your bowel movement, on the toilet paper, or in the toilet bowl    · Tissue bulging out of your anus (prolapsed hemorrhoids)    · Incontinence (poor control over urine or bowel movements)    How are hemorrhoids diagnosed? Your healthcare provider will ask about your symptoms, the foods you eat, and your bowel movements  He or she will examine your anus for external hemorrhoids  You may need the following:  · A digital rectal exam  is a test to check for hemorrhoids  Your healthcare provider will put a gloved finger inside your anus to feel for the hemorrhoids  · An anoscopy  is a test that uses a scope (small tube with a light and camera on the end) to look at your hemorrhoids  How are hemorrhoids treated? Treatment will depend on your symptoms  You may need any of the following:  · Medicines  can help decrease pain and swelling, and soften your bowel movement  The medicine may be a pill, pad, cream, or ointment  · Procedures  may be used to shrink or remove your hemorrhoid  Examples include rubber-band ligation, sclerotherapy, and photocoagulation  These procedures may be done in your healthcare provider's office   Ask your healthcare provider for more information about these procedures  · Surgery  may be needed to shrink or remove your hemorrhoids  How can I manage my symptoms? · Apply ice on your anus for 15 to 20 minutes every hour or as directed  Use an ice pack, or put crushed ice in a plastic bag  Cover it with a towel before you apply it to your anus  Ice helps prevent tissue damage and decreases swelling and pain  · Take a sitz bath  Fill a bathtub with 4 to 6 inches of warm water  You may also use a sitz bath pan that fits inside a toilet bowl  Sit in the sitz bath for 15 minutes  Do this 3 times a day, and after each bowel movement  The warm water can help decrease pain and swelling  · Keep your anal area clean  Gently wash the area with warm water daily  Soap may irritate the area  After a bowel movement, wipe with moist towelettes or wet toilet paper  Dry toilet paper can irritate the area  How can I help prevent hemorrhoids? · Do not strain to have a bowel movement  Do not sit on the toilet too long  These actions can increase pressure on the tissues in your rectum and anus  · Drink plenty of liquids  Liquids can help prevent constipation  Ask how much liquid to drink each day and which liquids are best for you  · Eat a variety of high-fiber foods  Examples include fruits, vegetables, and whole grains  Ask your healthcare provider how much fiber you need each day  You may need to take a fiber supplement  · Exercise as directed  Exercise, such as walking, may make it easier to have a bowel movement  Ask your healthcare provider to help you create an exercise plan  · Do not have anal sex  Anal sex can weaken the skin around your rectum and anus  · Avoid heavy lifting  This can cause straining and increase your risk for another hemorrhoid  When should I seek immediate care? · You have severe pain in your rectum or around your anus      · You have severe pain in your abdomen and you are vomiting  · You have bleeding from your anus that soaks through your underwear  When should I contact my healthcare provider? · You have frequent and painful bowel movements  · Your hemorrhoid looks or feels more swollen than usual      · You do not have a bowel movement for 2 days or more  · You see or feel tissue coming through your anus  · You have questions or concerns about your condition or care  CARE AGREEMENT:   You have the right to help plan your care  Learn about your health condition and how it may be treated  Discuss treatment options with your healthcare providers to decide what care you want to receive  You always have the right to refuse treatment  The above information is an  only  It is not intended as medical advice for individual conditions or treatments  Talk to your doctor, nurse or pharmacist before following any medical regimen to see if it is safe and effective for you  © Copyright [address] Information is for End User's use only and may not be sold, redistributed or otherwise used for commercial purposes  All illustrations and images included in CareNotes® are the copyrighted property of A D A M , Inc  or [address] [place] [address]  Colorectal Polyps   WHAT YOU NEED TO KNOW:   What are colorectal polyps? Colorectal polyps are small growths of tissue in the lining of the colon and rectum  Most polyps are hyperplastic polyps and are usually benign (noncancerous)  Certain types of polyps, called adenomatous polyps, may turn into cancer  What increases my risk of colorectal polyps? The exact cause of colorectal polyps is unknown   The following may increase your risk:  · Older age    · A diet of foods high in fat and low in fiber     · Family history of polyps    · Intestinal diseases, such as Crohn's disease or ulcerative colitis    · An unhealthy lifestyle, such as physical inactivity, smoking, or drinking alcohol    · Obesity    What are the signs and symptoms of colorectal polyps? · Blood in your bowel movement or bleeding from the rectum    · Change in bowel movement habits, such as diarrhea and constipation    · Abdominal pain    How are colorectal polyps diagnosed? You should have fecal blood screening once a year for colorectal disease if you are over 48years old  You should be screened earlier if you have an intestinal disease or a family history of polyps or colorectal cancer  During this screening, a sample of your bowel movement is checked for blood, which may be an early sign of colorectal polyps or cancer  You may also need any of the following tests:  · Digital rectal exam:  Your healthcare provider will examine your anus and use a finger to check your rectum for polyps  · Barium enema: A barium enema is an x-ray of the colon  A tube is put into your anus, and a liquid called barium is put through the tube  Barium is used so that healthcare providers can see your colon better on the x-ray film  · Virtual colonoscopy: This is a CT scan that takes pictures of the inside of your colon and rectum  A small, flexible tube is put into your rectum and air or carbon dioxide (gas) is used to expand your colon  This lets healthcare providers clearly see your colon and any polyps on a monitor  · Colonoscopy or sigmoidoscopy: These procedures help your healthcare provider see the inside of your colon using a flexible tube with a small light and camera on the end  During a sigmoidoscopy, your healthcare provider will only look at rectum and lower colon  During a colonoscopy, healthcare providers will look at the full length of your colon  Healthcare providers may remove a small amount of tissue from the colon for a biopsy  How are colorectal polyps treated? A polypectomy is a minimally invasive procedure to remove your polyps  They may be removed during a colonoscopy or sigmoidoscopy   Your healthcare provider may need to remove the polyps with a laparoscope  Laparoscopy is done by inserting a small, flexible scope into incisions made on your abdomen  What are the risks of colorectal polyps? You may bleed during a colonoscopy procedure  Your bowel may be perforated (torn) when polyps are removed  This may lead to an open abdominal surgery  During surgery, you may bleed too much or get an infection  Adenomatous polyps that are not removed may turn into cancer and become more difficult to treat  Where can I find support and more information? · [address] (Levine, Susan. \[facility].\"")  [address] , [facility] [address]  Phone: [phone]  Web Address: Matt Fernandez  [facility] nih gov    When should I contact my healthcare provider? · You have a fever  · You have chills, a cough, or feel weak and achy  · You have abdominal pain that does not go away or gets worse after you take medicine  · Your abdomen is swollen  · You are losing weight without trying  · You have questions or concerns about your condition or care  When should I seek immediate care or call 911? · You have sudden shortness of breath  · You have a fast heart rate, fast breathing, or are too dizzy to stand up  · You have severe abdominal pain  · You see blood in your bowel movement  CARE AGREEMENT:   You have the right to help plan your care  Learn about your health condition and how it may be treated  Discuss treatment options with your healthcare providers to decide what care you want to receive  You always have the right to refuse treatment  The above information is an  only  It is not intended as medical advice for individual conditions or treatments  Talk to your doctor, nurse or pharmacist before following any medical regimen to see if it is safe and effective for you    © Copyright [address] Information is for End User's use only and may not be sold, redistributed or otherwise used for commercial purposes   All illustrations and images included in CareNotes® are the copyrighted property of A D A M , Inc  or [facility] Carmen Pozo

## 2021-03-23 ENCOUNTER — EVALUATION (OUTPATIENT)
Dept: PHYSICAL THERAPY | Facility: CLINIC | Age: 61
End: 2021-03-23
Payer: MEDICARE

## 2021-03-23 ENCOUNTER — TELEPHONE (OUTPATIENT)
Dept: OBGYN CLINIC | Facility: HOSPITAL | Age: 61
End: 2021-03-23

## 2021-03-23 DIAGNOSIS — M17.11 PRIMARY OSTEOARTHRITIS OF RIGHT KNEE: Primary | ICD-10-CM

## 2021-03-23 DIAGNOSIS — I10 HTN (HYPERTENSION): ICD-10-CM

## 2021-03-23 DIAGNOSIS — M22.2X2 PATELLOFEMORAL DISORDER OF LEFT KNEE: ICD-10-CM

## 2021-03-23 DIAGNOSIS — M22.2X1 PATELLOFEMORAL DISORDER OF RIGHT KNEE: ICD-10-CM

## 2021-03-23 PROCEDURE — 97110 THERAPEUTIC EXERCISES: CPT | Performed by: PHYSICAL THERAPIST

## 2021-03-23 RX ORDER — AMILORIDE HYDROCHLORIDE 5 MG/1
TABLET ORAL
Qty: 120 TABLET | Refills: 0 | Status: SHIPPED | OUTPATIENT
Start: 2021-03-23 | End: 2021-06-01

## 2021-03-23 NOTE — PROGRESS NOTES
PT Re-Evaluation   Addendum 4/15/21: D/c pt from skilled PT  Pt has not returned to therapy  Today's date: 3/23/2021  Patient name: Hakan Cummins  : 1960  MRN: [id number]  Referring provider: Bita Baca DPM  Dx:   Encounter Diagnosis     ICD-10-CM    1  Primary osteoarthritis of right knee  M17 11    2  Patellofemoral disorder of right knee  M22 2X1    3  Patellofemoral disorder of left knee  M22 2X2                   Assessment  Assessment details: Since starting skilled PT, R knee ROM and strength has improved, however L knee pain has worsened  Recommend pt continue skilled PT  Pt will be having an MRI on L knee next week  Impairments: abnormal or restricted ROM, activity intolerance, impaired physical strength and pain with function  Barriers to therapy: 1  Insurance cost- pt has Medicare as primary only- no secondary insurance  Understanding of Dx/Px/POC: good   Prognosis: good    Goals  STG's ( 3-4 weeks)  1  Pt will be independent in HEP-met  2  Improve B ankle df ROM by 5*-10*-met  3  Pt will have improved R knee flexion ROM by 5*-met  LTG's ( 6- 8 weeks)  1  Improve FOTO score by 8-10 points  2  Improve R great toe flexion ROM-NT  3  Pt will have less pain going up and down the steps-not met  4  Pt will be able to walk community distances-not met  5   Pt will have less pain with standing activities for household ADL's-not met    Plan  Patient would benefit from: skilled physical therapy  Planned modality interventions: cryotherapy  Planned therapy interventions: manual therapy, joint mobilization, neuromuscular re-education, balance, stretching, strengthening, therapeutic activities, therapeutic exercise, flexibility, functional ROM exercises and home exercise program  Frequency: 2x week  Duration in weeks: 6  Plan of Care beginning date: 3/23/2021  Plan of Care expiration date: 2021  Treatment plan discussed with: patient        Subjective Evaluation    History of Present Illness  Date of surgery: 8/3/2020  Mechanism of injury: trauma  Mechanism of injury: I E: Pt fell down 3 steps with her feet in a plantar flexed position on her knees  Pt was carrying her dog and she thought she was at the bottom of the flight of steps  Pt was casted 3 times and was NWB  Pt has a CAM boot to wear as needed  Pt underwent R great toe bunionectomy on 8/3/20  Pt has increased pain when going up the steps, walking long distances, and running  Pt has started driving 2 weeks ago  Pt has been mainly sedentary due to fracture and bunionectomy  Pt has increased pain when squatting down to  objects from the floor  Pt has increased difficulty wearing shoes  Pt has increased difficulty transferring sit to stand to get off the toilet in the morning     21: Pt reports her feet do not feel too bad  Pt had B ankle pain when shoveling  Pt does not complaints in her feet with standing and walking activities  Pt had a history of having cortisone injections in her R knee with OA  Pt feels increased cracking in her knee and like her bone will come out of her leg  Pt has increased pain when transferring sit to stand to get out of a chair and standing in the kitchen for cooking  Pt needs to walk slowly in the grocery store  Pt fell about 4 times in the past 1 5 weeks  Pt lands on her knees and has increased difficulty getting up     3/23/21: Pt reports her L knee pain is worsening  Pt gets a sharp pain in her L knee  Pt has increased L knee pain with sitting, standing, and going up and down the steps  Pt is scheduled for an MRI next week       Work: not working  Hobbies: watches TV, walking her dog  Gait: mild antalgic gait pattern, decreased push off R foot  Pain  Current pain ratin  At best pain ratin  At worst pain ratin  Location: R knee;      L knee: best 5/10    7/10 worst  Quality: dull ache  Relieving factors: rest  Aggravating factors: stair climbing and walking    Social Support  Steps to enter house: yes  1  Stairs in house: yes   26  Lives in: multiple-level home    Employment status: not working  Treatments  Previous treatment: immobilization  Patient Goals  Patient goals for therapy: decreased pain and independence with ADLs/IADLs  Patient goal: to get back to normal; to have less pain going up the steps; to return to walking        Objective     Neurological Testing     Sensation     Knee   Left Knee   Intact: light touch    Right Knee   Intact: light touch     Ankle/Foot   Left Ankle/Foot   Intact: light touch    Right Ankle/Foot   Intact: light touch     Reflexes   Left   Patellar (L4): normal (2+)  Achilles (S1): normal (2+)    Right   Patellar (L4): normal (2+)  Achilles (S1): normal (2+)    Active Range of Motion   Left Knee   Flexion: 125 degrees     Right Knee   Flexion: 140 degrees   Extension: 0 degrees   Left Ankle/Foot   Dorsiflexion (ke): 4 degrees   Plantar flexion: WFL  Inversion: WFL  Eversion: WFL    Right Ankle/Foot   Dorsiflexion (ke): 0 degrees   Plantar flexion: WFL  Inversion: WFL  Eversion: WFL    Additional Active Range of Motion Details  (+) TTP R medial and lateral joint line, posterior knee  (+) TTP L medial joint line, posterior knee  HS flexibility: R: 45*  L: 50* with opposite knee flexed  (+) TTP L gastroc/ soleus complex  Decreased R great flexion and extension A/PROM  Increased pain with R first ray mobs  (+) TTP R great toe over surgical incision  No edema present upon visual inspection    Passive Range of Motion     Right Knee   Flexion: 133 degrees   Extension: 0 degrees with pain  Left Ankle/Foot    Dorsiflexion (ke): 4 degrees     Right Ankle/Foot    Dorsiflexion (ke): 0 degrees   Plantar flexion: WFL  Inversion: WFL  Eversion: WFL    Strength/Myotome Testing     Left Hip   Planes of Motion   Flexion: 4  Extension: 3  Abduction: 4+  External rotation: 4+  Internal rotation: 4+    Right Hip   Planes of Motion   Flexion: 4+  Extension: 3  Abduction: 4+  External rotation: 5  Internal rotation: 5    Left Knee   Flexion: 4+ (pain)  Extension: 5 (pain)    Right Knee   Flexion: 5  Extension: 5    Left Ankle/Foot   Dorsiflexion: 4+  Plantar flexion: 3+  Inversion: 4+  Eversion: 4+    Right Ankle/Foot   Dorsiflexion: 4+  Plantar flexion: 3+  Inversion: 4+  Eversion: 4+         Dx: B knee pain/ OA  EPOC: 4/5/21  CO-MORBIDITIES: bipolar disorder, depression, chronic LBP  PERSONAL FACTORS: medicare insurance only  Precautions: none      Manuals 2/25 3/1 3/4 3/8 3/10 3/23       R knee PROM nk nk th JK L/R         Progress note   th th       L knee posterior MFR     th th       K tape for support   th JK L/R  th        10' 10' 10' 10' 10' 10'                    Neuro Re-Ed             Step ups FW 4"x10 4"x10 4" x10 4" 10         sidestepping 10 10 3 laps  3 laps  3 laps       Step ups lat 4"x10 4"x10 4"x10 6" x10 p!          Standing TKE [place] 10x OTB 10x GTB x10 B GTB x10 B         bridges 10x5" 10x5" 10x5"  10 x5"                                  Ther Ex             bike 6' 6' 6' 5' 1' 5'       Standing HR 20 20 20 20  10       Stand gastroc stretch 3x20" 3x20" 1' B 1' B 1' supine 3x20"        Sit to stand  8 p!   x10 reformer 10x chair         B Ankle TB 4 way  15x ea R ankle TB 4 way OTB                                     DLS: SLR toe straight 10 10  15 15 10       DLS; adductor ball squeeze 10x10" 10x10"   10 x10" 10x10"       Quad sets   10x10" nv 10x10" 10x10"       S/l hip abduction   10 nv  10       LAQ 10x10" np   10                                  Modalities             Cp post tx 10' 8'   10' B

## 2021-03-23 NOTE — TELEPHONE ENCOUNTER
Patient is calling wanting to let us know that she is still experiencing pain while taking OTC medications and doing PT  She is icing as well  She has her MRI next week she is wondering if there is anything she can do besides what she is currently doing

## 2021-03-23 NOTE — TELEPHONE ENCOUNTER
I see she gets tramadol from Dr Angela Sy  She can take tylenol with the tramadol and continue the NSAIDS as well

## 2021-03-24 DIAGNOSIS — M46.1 BILATERAL SACROILIITIS (HCC): ICD-10-CM

## 2021-03-25 ENCOUNTER — APPOINTMENT (OUTPATIENT)
Dept: PHYSICAL THERAPY | Facility: CLINIC | Age: 61
End: 2021-03-25
Payer: MEDICARE

## 2021-03-25 RX ORDER — TRAMADOL HYDROCHLORIDE 50 MG/1
TABLET ORAL
Qty: 120 TABLET | Refills: 0 | Status: SHIPPED | OUTPATIENT
Start: 2021-03-25 | End: 2021-04-21 | Stop reason: SDUPTHER

## 2021-03-29 ENCOUNTER — TELEPHONE (OUTPATIENT)
Dept: PULMONOLOGY | Facility: CLINIC | Age: 61
End: 2021-03-29

## 2021-03-29 ENCOUNTER — HOSPITAL ENCOUNTER (OUTPATIENT)
Dept: MRI IMAGING | Facility: HOSPITAL | Age: 61
Discharge: HOME | End: 2021-03-29
Payer: MEDICARE

## 2021-03-29 DIAGNOSIS — M76.892 LEFT KNEE TENDONITIS: ICD-10-CM

## 2021-03-29 PROCEDURE — 73721 MRI JNT OF LWR EXTRE W/O DYE: CPT

## 2021-03-29 PROCEDURE — G1004 CDSM NDSC: HCPCS

## 2021-03-29 NOTE — TELEPHONE ENCOUNTER
Patient calling stating she normally gets symbicort through [employer]  She stated somebody called her but isn't sure who and seems very confused   She states she needs the medication but is waiting on something with the assistance program  Please advise and call her at [phone]

## 2021-03-30 ENCOUNTER — TELEPHONE (OUTPATIENT)
Dept: PULMONOLOGY | Facility: CLINIC | Age: 61
End: 2021-03-30

## 2021-03-30 DIAGNOSIS — R06.02 SOB (SHORTNESS OF BREATH): ICD-10-CM

## 2021-03-30 RX ORDER — BUDESONIDE AND FORMOTEROL FUMARATE DIHYDRATE 160; 4.5 UG/1; UG/1
2 AEROSOL RESPIRATORY (INHALATION) 2 TIMES DAILY
Qty: 3 INHALER | Refills: 3 | Status: SHIPPED | OUTPATIENT
Start: 2021-03-30 | End: 2021-04-08 | Stop reason: ALTCHOICE

## 2021-03-30 NOTE — TELEPHONE ENCOUNTER
Kendrick Number can you mail this to the patient once it is signed since your at [facility] today?  Thanks

## 2021-03-30 NOTE — TELEPHONE ENCOUNTER
Patient calling will be dropping off a form to the [place] office  for Dr Annabella Ramirez to fill out for an assistance program for [employer]

## 2021-03-30 NOTE — TELEPHONE ENCOUNTER
Called patient back she needs a printed script for a 90 day supply of Symbicort to send in with her assistance paperwork  I advised her I will mail it to her once it is printed and signed

## 2021-04-06 ENCOUNTER — TELEPHONE (OUTPATIENT)
Dept: FAMILY MEDICINE CLINIC | Facility: HOSPITAL | Age: 61
End: 2021-04-06

## 2021-04-06 ENCOUNTER — OFFICE VISIT (OUTPATIENT)
Dept: OBGYN CLINIC | Facility: CLINIC | Age: 61
End: 2021-04-06
Payer: MEDICARE

## 2021-04-06 VITALS
BODY MASS INDEX: 33.9 KG/M2 | HEIGHT: 67 IN | SYSTOLIC BLOOD PRESSURE: 144 MMHG | TEMPERATURE: 98.2 F | DIASTOLIC BLOOD PRESSURE: 82 MMHG | WEIGHT: 216 LBS

## 2021-04-06 DIAGNOSIS — L20.82 FLEXURAL ECZEMA: ICD-10-CM

## 2021-04-06 DIAGNOSIS — M17.12 PRIMARY OSTEOARTHRITIS OF LEFT KNEE: Primary | ICD-10-CM

## 2021-04-06 PROCEDURE — 99213 OFFICE O/P EST LOW 20 MIN: CPT | Performed by: ORTHOPAEDIC SURGERY

## 2021-04-06 NOTE — PROGRESS NOTES
Assessment:     1  Primary osteoarthritis of left knee        Plan:     Problem List Items Addressed This Visit        Musculoskeletal and Integument    Primary osteoarthritis of left knee - Primary      Findings consistent with left knee osteoarthritis with questionable lateral meniscus tear on the MRI  Discussed findings and treatment options with the patient  I reviewed patient's left knee MRI with her  I discussed possible cause of her knee pain  I do not appreciate a distinct tear in her lateral meniscus  There is degenerative changes in the meniscus  I informed patient that surgery may not resolve her pain which may mostly due to her arthritis  I will have patient see Dr Soheila Rincon for 2nd opinion  I will see patient back after her visit with Dr Soheila Rincon  Patient cannot take NSAID due to her kidney issue  I advised her to continue taking Tylenol and use of Aspercreme or Voltaren gel  Consider the use of joint supplement injections  All patient's questions were answered to her satisfaction  This note is created using dictation transcription  It may contain typographical errors, grammatical errors, improperly dictated words, background noise and other errors  Relevant Orders    Ambulatory referral to Orthopedic Surgery         Subjective:     Patient ID: Dustin Hemphill is a 64 y o  female  Chief Complaint:    61-year-old female follow-up left knee pain  Patient is here to review her left knee MRI  She continued to complaining of pain in the back of her knee and burning pulling sensations when walking  She denies locking or giving way sensations  Patient cannot take NSAIDs due to her kidney problem    She has been using the anti-inflammatory cream     Allergy:  Allergies   Allergen Reactions    Pollen Extract Nasal Congestion     Medications:  all current active meds have been reviewed  Past Medical History:  Past Medical History:   Diagnosis Date    Anxiety     Anxiety disorder  Bipolar 2 disorder (HCC)     Chronic back pain     Closed fracture of distal end of right fibula with routine healing 2020    COVID-19     in 2021    CVA (cere[facility] vascular accident) [facility])     noted on MRI in the past    Depression     GERD (gastroesophageal reflux disease)     Hypercholesteremia     Hypernatremia     Hypertension     Hypokalemia     Idiopathic chronic pancreatitis ([facility] ) 3/20/2018    Intervertebral disc disorder with radiculopathy of lumbosacral region     resolved: 2015    Kidney disease     Panic attacks     Pericardial effusion     Radiculitis     resolved: 2015    Secondary renal hyperparathyroidism ([facility] )     Vitamin D deficiency      Past Surgical History:  Past Surgical History:   Procedure Laterality Date    BUNIONECTOMY      Left foot     COLON SURGERY      COLONOSCOPY  2018    DILATION AND CURETTAGE OF UTERUS      INDUCED       surgically induced    AL ANASTOMOSIS,AV,ANY SITE Left 2019    Procedure: CREATION FISTULA ARTERIOVENOUS (AV) left wrists possible left upper;  Surgeon: Oscar Knox MD;  [facility];  Service: Vascular    AL CORRJ HALLUX VALGUS W/SESMDC W/DIST [place] [facility] Left 2019    Procedure: Bel Reid;  Surgeon: Adele Solomon DPM;  [facility];  Service: Podiatry    AL [place] W/SESMDC W/DIST [facility] Right 8/3/2020    Procedure: Rigo Mann;  Surgeon: Adele Solomon DPM;  [facility];  Service: Podiatry    AL ERCP DX COLLECTION SPECIMEN BRUSHING/WASHING N/A 2018    Procedure: ENDOSCOPIC RETROGRADE CHOLANGIOPANCREATOGRAPHY (ERCP);   Surgeon: Mariangel Howard MD;  [facility];  Service: Gastroenterology    AL LAP, SURG PROCTOPEXY N/A 2018    Procedure: ROBOTIC SIGMOID RESECTION / RECTOPEXY;  Surgeon: Rae Miner MD;  [facility];  Service: Colorectal    AL SIGMOIDOSCOPY FLX DX W/COLLJ SPEC BR/WA IF PFRMD N/A 7/13/2018    Procedure: Cohn Dials;  Surgeon: Imtiaz Quarles MD;  [facility];  Service: Colorectal    TUBAL LIGATION Bilateral 1997    US GUIDED THYROID BIOPSY  7/30/2019     Family History:  Family History   Problem Relation Age of Onset    Bipolar disorder Mother     Mental illness Mother         depression    Stroke Mother     Dementia Mother     Colon polyps Mother     Heart disease Father     Hypertension Father     Diabetes Father     Other Family         Back disorder    Diabetes Family     Heart disease Family     Hypertension Family     Breast cancer Family     Stroke Family     Thyroid disease Family     Breast cancer Paternal Grandmother     Breast cancer Paternal Levy     Breast cancer Maternal Aunt     Mental illness Sister     Colon polyps Sister     Mental illness Sister     Heart disease Sister     No Known Problems Sister     No Known Problems Sister     Other Son         pituitary tumor    Hypertension Son     Obesity Son     No Known Problems Son     Substance Abuse Neg Hx         neg fam hx    Colon cancer Neg Hx      Social History:  Social History     Substance and Sexual Activity   Alcohol Use Never    Frequency: Never    Binge frequency: Never     Social History     Substance and Sexual Activity   Drug Use Not Currently    Types: Marijuana     Social History     Tobacco Use   Smoking Status Never Smoker   Smokeless Tobacco Never Used     Review of Systems   Constitutional: Negative  HENT: Negative  Eyes: Negative  Respiratory: Negative  Cardiovascular: Negative  Gastrointestinal: Negative  Endocrine: Negative  Genitourinary: Negative  Musculoskeletal: Positive for arthralgias (Left knee) and joint swelling (Left knee)  Skin: Negative  Allergic/Immunologic: Negative  Neurological: Negative  Hematological: Negative  Psychiatric/Behavioral: Negative            Objective:  BP Readings from Last 1 Encounters:   04/06/21 144/82      Wt Readings from Last 1 Encounters:   04/06/21 98 kg (216 lb)      BMI:   Estimated body mass index is 33 83 kg/m² as calculated from the following:    Height as of this encounter: 5' 7" (1 702 m)  Weight as of this encounter: 98 kg (216 lb)  BSA:   Estimated body surface area is 2 09 meters squared as calculated from the following:    Height as of this encounter: 5' 7" (1 702 m)  Weight as of this encounter: 98 kg (216 lb)  Physical Exam  Vitals signs and nursing note reviewed  Constitutional:       Appearance: Normal appearance  She is well-developed  HENT:      Head: Normocephalic and atraumatic  Right Ear: External ear normal       Left Ear: External ear normal    Eyes:      Extraocular Movements: Extraocular movements intact  Conjunctiva/sclera: Conjunctivae normal    Neck:      Musculoskeletal: Neck supple  Pulmonary:      Effort: Pulmonary effort is normal    Musculoskeletal:      Left knee: She exhibits effusion (Trace)  Skin:     General: Skin is warm and dry  Neurological:      Mental Status: She is alert and oriented to person, place, and time  Deep Tendon Reflexes: Reflexes are normal and symmetric  Psychiatric:         Mood and Affect: Mood normal          Behavior: Behavior normal        Left Knee Exam     Tenderness   The patient is experiencing tenderness in the medial joint line and lateral joint line  Range of Motion   The patient has normal left knee ROM  Tests   Kateryna:  Medial - negative Lateral - negative  Varus: negative Valgus: negative  Patellar apprehension: negative    Other   Erythema: absent  Sensation: normal  Pulse: present  Swelling: mild  Effusion: effusion (Trace) present            I have personally reviewed pertinent films in PACS and my interpretation is Left knee MRI show degenerative changes in the patellofemoral compartment and in the medial compartment    There is linear horizontal signal activity in the lateral meniscus but no distinct tear

## 2021-04-06 NOTE — ASSESSMENT & PLAN NOTE
Findings consistent with left knee osteoarthritis with questionable lateral meniscus tear on the MRI  Discussed findings and treatment options with the patient  I reviewed patient's left knee MRI with her  I discussed possible cause of her knee pain  I do not appreciate a distinct tear in her lateral meniscus  There is degenerative changes in the meniscus  I informed patient that surgery may not resolve her pain which may mostly due to her arthritis  I will have patient see Dr Yesenia Ibarra for 2nd opinion  I will see patient back after her visit with Dr Yesenia Ibarra  Patient cannot take NSAID due to her kidney issue  I advised her to continue taking Tylenol and use of Aspercreme or Voltaren gel  Consider the use of joint supplement injections  All patient's questions were answered to her satisfaction  This note is created using dictation transcription  It may contain typographical errors, grammatical errors, improperly dictated words, background noise and other errors

## 2021-04-08 DIAGNOSIS — J45.40 MODERATE PERSISTENT ASTHMA WITHOUT COMPLICATION: Primary | ICD-10-CM

## 2021-04-09 ENCOUNTER — OFFICE VISIT (OUTPATIENT)
Dept: OBGYN CLINIC | Facility: CLINIC | Age: 61
End: 2021-04-09
Payer: MEDICARE

## 2021-04-09 VITALS
HEIGHT: 67 IN | TEMPERATURE: 99.3 F | WEIGHT: 212 LBS | SYSTOLIC BLOOD PRESSURE: 148 MMHG | DIASTOLIC BLOOD PRESSURE: 84 MMHG | HEART RATE: 92 BPM | BODY MASS INDEX: 33.27 KG/M2

## 2021-04-09 DIAGNOSIS — M17.12 PRIMARY OSTEOARTHRITIS OF LEFT KNEE: Primary | ICD-10-CM

## 2021-04-09 PROCEDURE — 99213 OFFICE O/P EST LOW 20 MIN: CPT | Performed by: ORTHOPAEDIC SURGERY

## 2021-04-09 NOTE — LETTER
April 9, 2021     [facility], [address] [place] [address]    Patient: Kacey Marquez   YOB: 1960   Date of Visit: 4/9/2021       Dear Dr Divine Wood:    Thank you for referring Maximo Cagle to me for evaluation  Below are my notes for this consultation  If you have questions, please do not hesitate to call me  I look forward to following your patient along with you  Sincerely,        Leni Koenig DO        CC: No Recipients  Leni Koenig DO  4/9/2021  9:35 PM  Signed  Ortho Sports Medicine Knee New Patient Visit     Assesment:   64 y o  female left knee patellofemoral joint OA with possible lateral meniscus tear    Plan:    Conservative treatment:    Ice to knee for 20 minutes at least 1-2 times daily  PT for ROM/strengthening to knee, hip and core  Follow up with Dr Anand Nicely repeat cortisone injection in the future or VISCO injections with PT    Discussed that it is likely her pain is coming from the patellofemoral joint OA, however after conservative treatment fails and her pain localizes more definitively to the lateral joint line, she could consider surgery to address the possible lateral meniscus  Imaging: All imaging from today was reviewed by myself and explained to the patient  Injection:    No Injection planned at this time  Surgery:     No surgery is recommended at this point, continue with conservative treatment plan as noted  Follow up:    No follow-ups on file  Chief Complaint   Patient presents with    Left Knee - Pain       History of Present Illness: The patient is a 64 y o  female whose occupation is disabled, referred to me by Dr Divine Wood, seen in clinic for consultation of left knee pain  Pain is located anterior, posterior  The patient rates the pain as a 7/10  The pain has been present for 2 months  The patient denies one specific injury, she states that she has had multiple falls    The mechanism of injury was a few falls  The pain is characterized as sharp, stabbing  The pain is present at all times  Pain is improved by rest, ice, NSAIDS, physical therapy and injection  Pain is aggravated by stairs, squatting, weight bearing, walking, sitting and standing  Symptoms include clicking, popping and locking  The patient has tried rest, ice, NSAIDS, physical therapy and injection            Knee Surgical History:  None    Past Medical, Social and Family History:  Past Medical History:   Diagnosis Date    Anxiety     Anxiety disorder     Bipolar 2 disorder ([facility] )     Chronic back pain     Closed fracture of distal end of right fibula with routine healing 2020    COVID-19     in 2021    [facility])     noted on MRI in the past    Depression     GERD (gastroesophageal reflux disease)     Hypercholesteremia     Hypernatremia     Hypertension     Hypokalemia     Idiopathic chronic pancreatitis ([facility] ) 3/20/2018    Intervertebral disc disorder with radiculopathy of lumbosacral region     resolved: 2015    Kidney disease     Panic attacks     Pericardial effusion     Radiculitis     resolved: 2015    Secondary renal hyperparathyroidism ([place] [address] )     Vitamin D deficiency      Past Surgical History:   Procedure Laterality Date    BUNIONECTOMY      Left foot     COLON SURGERY      COLONOSCOPY  2018    DILATION AND CURETTAGE OF UTERUS      INDUCED       surgically induced    IN ANASTOMOSIS,AV,ANY SITE Left 2019    Procedure: CREATION FISTULA ARTERIOVENOUS (AV) left wrists possible left upper;  Surgeon: Jaren Barnes MD;  Location:  MAIN OR;  Service: Vascular    [place] W/SESMDC W/DIST [place] Left 2019    Procedure: Leny Carl;  Surgeon: Radha Oconnor DPM;  [facility];  Service: Podiatry    IN [place] W/SESMDC W/DIST [place] Right 8/3/2020    Procedure: Viktor Abreu RAFAEL;  Surgeon: Trevor Lobo DPM;  [facility];  Service: Podiatry    NV ERCP DX COLLECTION SPECIMEN BRUSHING/WASHING N/A 4/11/2018    Procedure: ENDOSCOPIC RETROGRADE CHOLANGIOPANCREATOGRAPHY (ERCP); Surgeon: Michele Suarez MD;  [facility];  Service: Gastroenterology    NV LAP, SURG PROCTOPEXY N/A 7/13/2018    Procedure: ROBOTIC SIGMOID RESECTION / RECTOPEXY;  Surgeon: Atilio Vazquez MD;  [facility];  Service: Colorectal    NV SIGMOIDOSCOPY FLX DX W/COLLJ SPEC BR/WA IF PFRMD N/A 7/13/2018    Procedure: SIGMOIDOSCOPY FLEXIBLE;  Surgeon: Atilio Vazquez MD;  [facility];  Service: Colorectal    TUBAL LIGATION Bilateral 1997    US GUIDED THYROID BIOPSY  7/30/2019     Allergies   Allergen Reactions    Pollen Extract Nasal Congestion     Current Outpatient Medications on File Prior to Visit   Medication Sig Dispense Refill    acetaminophen (TYLENOL) 325 mg tablet Take 650 mg by mouth every 6 (six) hours as needed for mild pain      AMILoride 5 mg tablet Take 1 tablet by mouth twice daily 120 tablet 0    aspirin (ECOTRIN LOW STRENGTH) 81 mg EC tablet Take 1 tablet (81 mg total) by mouth daily 30 tablet 0    atorvastatin (LIPITOR) 40 mg tablet Take 1 tablet (40 mg total) by mouth daily 30 tablet 11    carvedilol (COREG) 3 125 mg tablet Take 2 tablets (6 25 mg total) by mouth 2 (two) times a day with meals 90 tablet 3    clonazePAM (KlonoPIN) 0 5 mg tablet Take 1 tablet (0 5 mg total) by mouth 3 (three) times a day 270 tablet 0    fluocinonide (LIDEX) 0 05 % ointment Apply topically 2 (two) times a day 60 g 1    fluticasone-salmeterol (Wixela Inhub) 250-50 mcg/dose inhaler Inhale 1 puff 2 (two) times a day Rinse mouth after use   1 Inhaler 5    fluvoxaMINE (LUVOX) 100 mg tablet 3 at bedtime      HYDROcodone-acetaminophen (NORCO) 5-325 mg per tablet Take 1 tablet by mouth every 6 (six) hours as needed for painMax Daily Amount: 4 tablets 20 tablet 0    lamoTRIgine (LaMICtal) 200 MG tablet Take 400 mg by mouth daily at bedtime       methylPREDNISolone 4 MG tablet therapy pack Use as directed on package 21 each 0    naloxone (NARCAN) 4 mg/0 1 mL nasal spray Administer 1 spray into a nostril  If breathing does not return to normal or if breathing difficulty resumes after 2-3 minutes, give another dose in the other nostril using a new spray  1 each 1    omeprazole (PriLOSEC) 20 mg delayed release capsule Take 1 capsule (20 mg total) by mouth daily at bedtime 90 capsule 3    ondansetron (ZOFRAN-ODT) 4 mg disintegrating tablet DISSOLVE 1 TABLET IN MOUTH EVERY 8 HOURS AS NEEDED FOR NAUSEA AND VOMITING 60 tablet 0    Polyethylene Glycol 3350 (MIRALAX PO) Take by mouth as needed       predniSONE 10 mg tablet Take 3 pills for 3 days, then 2 pills for 3 days, then 1 pill daily until competed  20 tablet 0    QUEtiapine (SEROquel) 100 mg tablet Take 1 tablet by mouth daily at bedtime 1 at bedtime in addition to 400 mg tab      QUEtiapine (SEROQUEL) 300 mg tablet Take 1 tablet (300 mg total) by mouth every morning 1 in the AM     ( also takes 400 mg at bedtime) 90 tablet 3    QUEtiapine (SEROquel) 400 MG tablet 1 at bedtime     (also takes 100 mg and 300 mg tabs)      traMADol (ULTRAM) 50 mg tablet Take 2 tabs by mouth twice daily 120 tablet 0    linaCLOtide (Linzess) 290 MCG CAPS Take 1 capsule by mouth daily 90 capsule 3     No current facility-administered medications on file prior to visit        Social History     Socioeconomic History    Marital status:      Spouse name: Not on file    Number of children: Not on file    Years of education: Not on file    Highest education level: Not on file   Occupational History    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   Social Needs    Financial resource strain: Not on file    Food insecurity     Worry: Not on file     Inability: Not on file    Transportation needs     Medical: No     Non-medical: No   Tobacco Use    Smoking status: Never Smoker    Smokeless tobacco: Never Used   Substance and Sexual Activity    Alcohol use: Never     Frequency: Never     Binge frequency: Never    Drug use: Not Currently     Types: Marijuana    Sexual activity: Not Currently   Lifestyle    Physical activity     Days per week: 0 days     Minutes per session: 0 min    Stress: To some extent   Relationships    Social connections     Talks on phone: Not on file     Gets together: Not on file     Attends [employer] service: Not on file     Active member of club or organization: Not on file     Attends meetings of clubs or organizations: Not on file     Relationship status: Not on file    Intimate partner violence     Fear of current or ex partner: No     Emotionally abused: No     Physically abused: No     Forced sexual activity: No   Other Topics Concern    Not on file   Social History Narrative    Daily caffeine consumption 2-3 servings a day    Lives with family  No living will  Has dentures---no dental care  Primary language--English  Feels safe at home  I have reviewed the past medical, surgical, social and family history, medications and allergies as documented in the EMR  Review of systems: ROS is negative other than that noted in the HPI  Constitutional: Negative for fatigue and fever  HENT: Negative for sore throat  Respiratory: Negative for shortness of breath  Cardiovascular: Negative for chest pain  Gastrointestinal: Negative for abdominal pain  Endocrine: Negative for cold intolerance and heat intolerance  Genitourinary: Negative for flank pain  Musculoskeletal: Negative for back pain  Skin: Negative for rash  Allergic/Immunologic: Negative for immunocompromised state  Neurological: Negative for dizziness  Psychiatric/Behavioral: Negative for agitation        Physical Exam:    Blood pressure 148/84, pulse 92, temperature 99 3 °F (37 4 °C), height 5' 7" (1 702 m), weight 96 2 kg (212 lb), not currently breastfeeding  General/Constitutional: NAD, well developed, well nourished  HENT: Normocephalic, atraumatic  CV: Intact distal pulses, regular rate  Resp: No respiratory distress or labored breathing  Lymphatic: No lymphadenopathy palpated  Neuro: Alert and Oriented x 3, no focal deficits  Psych: Normal mood, normal affect, normal judgement, normal behavior  Skin: Warm, dry, no rashes, no erythema      Knee Exam (focused): RIGHT LEFT   ROM:   0-130 0-130   Palpation: Effusion negative negative     MJL tenderness Negative Positive     LJL tenderness Negative Positive   Meniscus: Kateryna Negative Positive    Apley's Compression Negative Positive   Instability: Varus stable stable     Valgus stable stable   Special Tests: Lachman Negative Negative     Posterior drawer Negative Negative     Anterior drawer Negative Negative     Pivot shift not tested not tested     Dial not tested not tested   Patella: Palpation no tenderness no tenderness     Mobility 1/4 1/4     Apprehension Negative Negative   Other: Single leg 1/4 squat not tested not tested      LE NV Exam: +2 DP/PT pulses bilaterally  Sensation intact to light touch L2-S1 bilaterally     Bilateral hip ROM demonstrates no pain actively or passively    No calf tenderness to palpation bilaterally    Knee Imaging    X-rays of the left knee were reviewed, which demonstrate  mild medial joint osteoarthritis  I have reviewed the radiology report and agree with their impression  MRI of the left knee were reviewed, which demonstrate  Grade 4 focal patella chondrosis  Possible lateral meniscus tear  I have reviewed the radiology report and agree with their impression        Scribe Attestation    I,:  Dar Juarez am acting as a scribe while in the presence of the attending physician :       I,:  Eun Cheatham DO personally performed the services described in this documentation    as scribed in my presence :

## 2021-04-09 NOTE — PROGRESS NOTES
Ortho Sports Medicine Knee New Patient Visit     Assesment:   64 y o  female left knee patellofemoral joint OA with possible lateral meniscus tear    Plan:    Conservative treatment:    Ice to knee for 20 minutes at least 1-2 times daily  PT for ROM/strengthening to knee, hip and core  Follow up with Dr Maranda Courtney repeat cortisone injection in the future or VISCO injections with PT    Discussed that it is likely her pain is coming from the patellofemoral joint OA, however after conservative treatment fails and her pain localizes more definitively to the lateral joint line, she could consider surgery to address the possible lateral meniscus  Imaging: All imaging from today was reviewed by myself and explained to the patient  Injection:    No Injection planned at this time  Surgery:     No surgery is recommended at this point, continue with conservative treatment plan as noted  Follow up:    No follow-ups on file  Chief Complaint   Patient presents with    Left Knee - Pain       History of Present Illness: The patient is a 64 y o  female whose occupation is disabled, referred to me by Dr Shaina Hernandez, seen in clinic for consultation of left knee pain  Pain is located anterior, posterior  The patient rates the pain as a 7/10  The pain has been present for 2 months  The patient denies one specific injury, she states that she has had multiple falls  The mechanism of injury was a few falls  The pain is characterized as sharp, stabbing  The pain is present at all times  Pain is improved by rest, ice, NSAIDS, physical therapy and injection  Pain is aggravated by stairs, squatting, weight bearing, walking, sitting and standing  Symptoms include clicking, popping and locking  The patient has tried rest, ice, NSAIDS, physical therapy and injection            Knee Surgical History:  None    Past Medical, Social and Family History:  Past Medical History:   Diagnosis Date    Anxiety     Anxiety disorder     Bipolar 2 disorder (HCC)     Chronic back pain     Closed fracture of distal end of right fibula with routine healing 2020    COVID-19     in 2021    CVA [facility])     noted on MRI in the past    Depression     GERD (gastroesophageal reflux disease)     Hypercholesteremia     Hypernatremia     Hypertension     Hypokalemia     Idiopathic chronic pancreatitis ([facility] ) 3/20/2018    Intervertebral disc disorder with radiculopathy of lumbosacral region     resolved: 2015    Kidney disease     Panic attacks     Pericardial effusion     Radiculitis     resolved: 2015    Secondary renal hyperparathyroidism ([facility] [address] )     Vitamin D deficiency      Past Surgical History:   Procedure Laterality Date    BUNIONECTOMY      Left foot     COLON SURGERY      COLONOSCOPY  2018    DILATION AND CURETTAGE OF UTERUS      INDUCED       surgically induced    SC ANASTOMOSIS,AV,ANY SITE Left 2019    Procedure: CREATION FISTULA ARTERIOVENOUS (AV) left wrists possible left upper;  Surgeon: Constance Fuentes MD;  [facility];  Service: Vascular    SC CORRJ HALLUX VALGUS W/SESMDC W/DIST Cindy Numbers Left 2019    Procedure: Vergil Heads;  Surgeon: Leo Mays DPM;  [facility];  Service: Podiatry    SC [place] W/SESMDC W/DIST Cindy Numbers Right 8/3/2020    Procedure: [place];  Surgeon: Leo Mays DPM;  [facility];  Service: Podiatry    SC ERCP DX COLLECTION SPECIMEN BRUSHING/WASHING N/A 2018    Procedure: ENDOSCOPIC RETROGRADE CHOLANGIOPANCREATOGRAPHY (ERCP);   Surgeon: Gigi Greco MD;  [facility];  Service: Gastroenterology    SC LAP, SURG PROCTOPEXY N/A 2018    Procedure: ROBOTIC SIGMOID RESECTION / RECTOPEXY;  Surgeon: Blake Richmond MD;  [facility];  Service: Colorectal    SC SIGMOIDOSCOPY FLX DX W/COLLJ SPEC BR/WA IF PFRMD N/A 7/13/2018    Procedure: SIGMOIDOSCOPY FLEXIBLE;  Surgeon: Sabiha Anthony MD;  [facility];  Service: Colorectal    TUBAL LIGATION Bilateral 1997    US GUIDED THYROID BIOPSY  7/30/2019     Allergies   Allergen Reactions    Pollen Extract Nasal Congestion     Current Outpatient Medications on File Prior to Visit   Medication Sig Dispense Refill    acetaminophen (TYLENOL) 325 mg tablet Take 650 mg by mouth every 6 (six) hours as needed for mild pain      AMILoride 5 mg tablet Take 1 tablet by mouth twice daily 120 tablet 0    aspirin (ECOTRIN LOW STRENGTH) 81 mg EC tablet Take 1 tablet (81 mg total) by mouth daily 30 tablet 0    atorvastatin (LIPITOR) 40 mg tablet Take 1 tablet (40 mg total) by mouth daily 30 tablet 11    carvedilol (COREG) 3 125 mg tablet Take 2 tablets (6 25 mg total) by mouth 2 (two) times a day with meals 90 tablet 3    clonazePAM (KlonoPIN) 0 5 mg tablet Take 1 tablet (0 5 mg total) by mouth 3 (three) times a day 270 tablet 0    fluocinonide (LIDEX) 0 05 % ointment Apply topically 2 (two) times a day 60 g 1    fluticasone-salmeterol (Wixela Inhub) 250-50 mcg/dose inhaler Inhale 1 puff 2 (two) times a day Rinse mouth after use  1 Inhaler 5    fluvoxaMINE (LUVOX) 100 mg tablet 3 at bedtime      HYDROcodone-acetaminophen (NORCO) 5-325 mg per tablet Take 1 tablet by mouth every 6 (six) hours as needed for painMax Daily Amount: 4 tablets 20 tablet 0    lamoTRIgine (LaMICtal) 200 MG tablet Take 400 mg by mouth daily at bedtime       methylPREDNISolone 4 MG tablet therapy pack Use as directed on package 21 each 0    naloxone (NARCAN) 4 mg/0 1 mL nasal spray Administer 1 spray into a nostril  If breathing does not return to normal or if breathing difficulty resumes after 2-3 minutes, give another dose in the other nostril using a new spray   1 each 1    omeprazole (PriLOSEC) 20 mg delayed release capsule Take 1 capsule (20 mg total) by mouth daily at bedtime 90 capsule 3  ondansetron (ZOFRAN-ODT) 4 mg disintegrating tablet DISSOLVE 1 TABLET IN MOUTH EVERY 8 HOURS AS NEEDED FOR NAUSEA AND VOMITING 60 tablet 0    Polyethylene Glycol 3350 (MIRALAX PO) Take by mouth as needed       predniSONE 10 mg tablet Take 3 pills for 3 days, then 2 pills for 3 days, then 1 pill daily until competed  20 tablet 0    QUEtiapine (SEROquel) 100 mg tablet Take 1 tablet by mouth daily at bedtime 1 at bedtime in addition to 400 mg tab      QUEtiapine (SEROQUEL) 300 mg tablet Take 1 tablet (300 mg total) by mouth every morning 1 in the AM     ( also takes 400 mg at bedtime) 90 tablet 3    QUEtiapine (SEROquel) 400 MG tablet 1 at bedtime     (also takes 100 mg and 300 mg tabs)      traMADol (ULTRAM) 50 mg tablet Take 2 tabs by mouth twice daily 120 tablet 0    linaCLOtide (Linzess) 290 MCG CAPS Take 1 capsule by mouth daily 90 capsule 3     No current facility-administered medications on file prior to visit  Social History     Socioeconomic History    Marital status:      Spouse name: Not on file    Number of children: Not on file    Years of education: Not on file    Highest education level: Not on file   Occupational History    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   Social Needs    Financial resource strain: Not on file    Food insecurity     Worry: Not on file     Inability: Not on file    Transportation needs     Medical: No     Non-medical: No   Tobacco Use    Smoking status: Never Smoker    Smokeless tobacco: Never Used   Substance and Sexual Activity    Alcohol use: Never     Frequency: Never     Binge frequency: Never    Drug use: Not Currently     Types: Marijuana    Sexual activity: Not Currently   Lifestyle    Physical activity     Days per week: 0 days     Minutes per session: 0 min    Stress:  To some extent   Relationships    Social connections     Talks on phone: Not on file     Gets together: Not on file     Attends Congregation service: Not on file     Active member of club or organization: Not on file     Attends meetings of clubs or organizations: Not on file     Relationship status: Not on file    Intimate partner violence     Fear of current or ex partner: No     Emotionally abused: No     Physically abused: No     Forced sexual activity: No   Other Topics Concern    Not on file   Social History Narrative    Daily caffeine consumption 2-3 servings a day    Lives with family  No living will  Has dentures---no dental care  Primary language--English  Feels safe at home  I have reviewed the past medical, surgical, social and family history, medications and allergies as documented in the EMR  Review of systems: ROS is negative other than that noted in the HPI  Constitutional: Negative for fatigue and fever  HENT: Negative for sore throat  Respiratory: Negative for shortness of breath  Cardiovascular: Negative for chest pain  Gastrointestinal: Negative for abdominal pain  Endocrine: Negative for cold intolerance and heat intolerance  Genitourinary: Negative for flank pain  Musculoskeletal: Negative for back pain  Skin: Negative for rash  Allergic/Immunologic: Negative for immunocompromised state  Neurological: Negative for dizziness  Psychiatric/Behavioral: Negative for agitation  Physical Exam:    Blood pressure 148/84, pulse 92, temperature 99 3 °F (37 4 °C), height 5' 7" (1 702 m), weight 96 2 kg (212 lb), not currently breastfeeding  General/Constitutional: NAD, well developed, well nourished  HENT: Normocephalic, atraumatic  CV: Intact distal pulses, regular rate  Resp: No respiratory distress or labored breathing  Lymphatic: No lymphadenopathy palpated  Neuro: Alert and Oriented x 3, no focal deficits  Psych: Normal mood, normal affect, normal judgement, normal behavior  Skin: Warm, dry, no rashes, no erythema      Knee Exam (focused):                 RIGHT LEFT   ROM:   0-130 0-130   Palpation: Effusion negative negative     MJL tenderness Negative Positive     LJL tenderness Negative Positive   Meniscus: Kateryna Negative Positive    Apley's Compression Negative Positive   Instability: Varus stable stable     Valgus stable stable   Special Tests: Lachman Negative Negative     Posterior drawer Negative Negative     Anterior drawer Negative Negative     Pivot shift not tested not tested     Dial not tested not tested   Patella: Palpation no tenderness no tenderness     Mobility 1/4 1/4     Apprehension Negative Negative   Other: Single leg 1/4 squat not tested not tested      LE NV Exam: +2 DP/PT pulses bilaterally  Sensation intact to light touch L2-S1 bilaterally     Bilateral hip ROM demonstrates no pain actively or passively    No calf tenderness to palpation bilaterally    Knee Imaging    X-rays of the left knee were reviewed, which demonstrate  mild medial joint osteoarthritis  I have reviewed the radiology report and agree with their impression  MRI of the left knee were reviewed, which demonstrate  Grade 4 focal patella chondrosis  Possible lateral meniscus tear  I have reviewed the radiology report and agree with their impression        Scribe Attestation    I,:  Lupe Ramirez am acting as a scribe while in the presence of the attending physician :       I,:  Komal Pastrana DO personally performed the services described in this documentation    as scribed in my presence :

## 2021-04-11 RX ORDER — FLUOCINONIDE 0.5 MG/G
OINTMENT TOPICAL 2 TIMES DAILY
Qty: 60 G | Refills: 1 | Status: SHIPPED | OUTPATIENT
Start: 2021-04-11 | End: 2022-03-28 | Stop reason: ALTCHOICE

## 2021-04-19 ENCOUNTER — OFFICE VISIT (OUTPATIENT)
Dept: FAMILY MEDICINE CLINIC | Facility: HOSPITAL | Age: 61
End: 2021-04-19
Payer: MEDICARE

## 2021-04-19 VITALS
SYSTOLIC BLOOD PRESSURE: 170 MMHG | HEIGHT: 67 IN | WEIGHT: 212.6 LBS | DIASTOLIC BLOOD PRESSURE: 60 MMHG | BODY MASS INDEX: 33.37 KG/M2 | OXYGEN SATURATION: 95 % | HEART RATE: 96 BPM

## 2021-04-19 DIAGNOSIS — E21.3 HYPERPARATHYROIDISM (HCC): ICD-10-CM

## 2021-04-19 DIAGNOSIS — T82.898S STEAL SYNDROME AS COMPLICATION OF DIALYSIS ACCESS, SEQUELA: Primary | ICD-10-CM

## 2021-04-19 DIAGNOSIS — Z12.31 ENCOUNTER FOR SCREENING MAMMOGRAM FOR MALIGNANT NEOPLASM OF BREAST: ICD-10-CM

## 2021-04-19 DIAGNOSIS — F31.4 BIPOLAR 1 DISORDER, DEPRESSED, SEVERE (HCC): ICD-10-CM

## 2021-04-19 DIAGNOSIS — U07.1 PNEUMONIA DUE TO COVID-19 VIRUS: ICD-10-CM

## 2021-04-19 DIAGNOSIS — I77.0 AVF (ARTERIOVENOUS FISTULA) (HCC): ICD-10-CM

## 2021-04-19 DIAGNOSIS — J12.82 PNEUMONIA DUE TO COVID-19 VIRUS: ICD-10-CM

## 2021-04-19 DIAGNOSIS — J45.40 MODERATE PERSISTENT ASTHMA WITHOUT COMPLICATION: ICD-10-CM

## 2021-04-19 DIAGNOSIS — M43.17 SPONDYLOLISTHESIS AT L5-S1 LEVEL: ICD-10-CM

## 2021-04-19 DIAGNOSIS — K86.1 IDIOPATHIC CHRONIC PANCREATITIS (HCC): ICD-10-CM

## 2021-04-19 DIAGNOSIS — N18.4 CKD (CHRONIC KIDNEY DISEASE) STAGE 4, GFR 15-29 ML/MIN (HCC): ICD-10-CM

## 2021-04-19 DIAGNOSIS — I10 ESSENTIAL HYPERTENSION: ICD-10-CM

## 2021-04-19 PROCEDURE — 99214 OFFICE O/P EST MOD 30 MIN: CPT | Performed by: INTERNAL MEDICINE

## 2021-04-19 NOTE — PROGRESS NOTES
Assessment/Plan:             Problem List Items Addressed This Visit        Digestive    Idiopathic chronic pancreatitis ([facility] )       Endocrine    Hyperparathyroidism ([facility] )       Respiratory    Moderate persistent asthma without complication    Pneumonia due to COVID-19 virus     Had dx 1/25/21- will check repeat cxr to assure clearing of symptoms         Relevant Orders    XR chest pa & lateral       Cardiovascular and Mediastinum    Hypertension     Elevated today- dicussed avoidance of salty foods  Has home cuff- will have her call in readings in 2 weeks         [facility] vascular access ([place] [address] ) - Primary    AVF (arteriovenous fistula) ([facility]       Musculoskeletal and Integument    Spondylolisthesis at L5-S1 level     Doing PT- seeing Dr Bev Christian for ankle  Issues- with recent fracture            Genitourinary    CKD (chronic kidney disease) stage 4, GFR 15-29 ml/min ([facility])       Other    Bipolar 1 disorder, depressed, severe ([place] [address] )      Other Visit Diagnoses     Encounter for screening mammogram for malignant neoplasm of breast        Relevant Orders    Mammo screening bilateral w 3d & cad            Subjective:      Patient ID: Dee Dee Dominguez is a 64 y o  female    1  Had ankle fracture- has slip to get a dexascan- did not call yet  2  esrd- not yet on dialysis- has right forearm shunt since September- being followed closely by team Dr Terri Parks is following closely  3  covid pneumonia- will have repeat cxr- on inhalers for asthma issues- no complaints of  Brain fog- has had longer term forgetfullness for short term memory will have her sign up for vaccine  4  Screening-will have pap with next visit        The following portions of the patient's history were reviewed and updated as appropriate: allergies, current medications and problem list      Review of Systems   HENT: Positive for congestion  Eyes:        Wearing glasses   Respiratory: Positive for cough and chest tightness      Gastrointestinal: Negative for abdominal distention and constipation  All other systems reviewed and are negative  Objective:      Current Outpatient Medications:     acetaminophen (TYLENOL) 325 mg tablet, Take 650 mg by mouth every 6 (six) hours as needed for mild pain, Disp: , Rfl:     AMILoride 5 mg tablet, Take 1 tablet by mouth twice daily, Disp: 120 tablet, Rfl: 0    aspirin (ECOTRIN LOW STRENGTH) 81 mg EC tablet, Take 1 tablet (81 mg total) by mouth daily, Disp: 30 tablet, Rfl: 0    atorvastatin (LIPITOR) 40 mg tablet, Take 1 tablet (40 mg total) by mouth daily, Disp: 30 tablet, Rfl: 11    carvedilol (COREG) 3 125 mg tablet, Take 2 tablets (6 25 mg total) by mouth 2 (two) times a day with meals, Disp: 90 tablet, Rfl: 3    clonazePAM (KlonoPIN) 0 5 mg tablet, Take 1 tablet (0 5 mg total) by mouth 3 (three) times a day, Disp: 270 tablet, Rfl: 0    fluocinonide (LIDEX) 0 05 % ointment, Apply topically 2 (two) times a day, Disp: 60 g, Rfl: 1    fluticasone-salmeterol (Wixela Inhub) 250-50 mcg/dose inhaler, Inhale 1 puff 2 (two) times a day Rinse mouth after use , Disp: 1 Inhaler, Rfl: 5    fluvoxaMINE (LUVOX) 100 mg tablet, 3 at bedtime, Disp: , Rfl:     HYDROcodone-acetaminophen (NORCO) 5-325 mg per tablet, Take 1 tablet by mouth every 6 (six) hours as needed for painMax Daily Amount: 4 tablets, Disp: 20 tablet, Rfl: 0    lamoTRIgine (LaMICtal) 200 MG tablet, Take 400 mg by mouth daily at bedtime , Disp: , Rfl:     naloxone (NARCAN) 4 mg/0 1 mL nasal spray, Administer 1 spray into a nostril   If breathing does not return to normal or if breathing difficulty resumes after 2-3 minutes, give another dose in the other nostril using a new spray , Disp: 1 each, Rfl: 1    omeprazole (PriLOSEC) 20 mg delayed release capsule, Take 1 capsule (20 mg total) by mouth daily at bedtime, Disp: 90 capsule, Rfl: 3    ondansetron (ZOFRAN-ODT) 4 mg disintegrating tablet, DISSOLVE 1 TABLET IN MOUTH EVERY 8 HOURS AS NEEDED FOR NAUSEA AND VOMITING, Disp: 60 tablet, Rfl: 0    Polyethylene Glycol 3350 (MIRALAX PO), Take by mouth as needed , Disp: , Rfl:     QUEtiapine (SEROquel) 100 mg tablet, Take 1 tablet by mouth daily at bedtime 1 at bedtime in addition to 400 mg tab, Disp: , Rfl:     QUEtiapine (SEROQUEL) 300 mg tablet, Take 1 tablet (300 mg total) by mouth every morning 1 in the AM     ( also takes 400 mg at bedtime), Disp: 90 tablet, Rfl: 3    QUEtiapine (SEROquel) 400 MG tablet, 1 at bedtime     (also takes 100 mg and 300 mg tabs), Disp: , Rfl:     traMADol (ULTRAM) 50 mg tablet, Take 2 tabs by mouth twice daily, Disp: 120 tablet, Rfl: 0    linaCLOtide (Linzess) 290 MCG CAPS, Take 1 capsule by mouth daily, Disp: 90 capsule, Rfl: 3    Blood pressure 170/60, pulse 96, height 5' 7" (1 702 m), weight 96 4 kg (212 lb 9 6 oz), SpO2 95 %, not currently breastfeeding  Physical Exam  Vitals signs and nursing note reviewed  Constitutional:       General: She is not in acute distress  HENT:      Right Ear: There is impacted cerumen  Left Ear: There is impacted cerumen  Mouth/Throat:      Pharynx: No posterior oropharyngeal erythema  Eyes:      General: No scleral icterus  Right eye: No discharge  Left eye: No discharge  Cardiovascular:      Rate and Rhythm: Normal rate and regular rhythm  Heart sounds: No murmur  Pulmonary:      Effort: No respiratory distress  Breath sounds: Rhonchi present  Comments: Minimal upper airway rhonchi  Abdominal:      General: Abdomen is flat  There is no distension  Palpations: Abdomen is soft  Tenderness: There is no abdominal tenderness  Skin:     Coloration: Skin is not jaundiced  Findings: No erythema  Neurological:      Mental Status: She is alert  Psychiatric:         Mood and Affect: Mood normal          Thought Content:  Thought content normal          Judgment: Judgment normal

## 2021-04-19 NOTE — PATIENT INSTRUCTIONS
Call to schedule for mammogram and dxa scan   check with sister if genetic testing was done   please schedule for covid shot next month- please assist her

## 2021-04-19 NOTE — ASSESSMENT & PLAN NOTE
Elevated today- dicussed avoidance of salty foods  Has home cuff- will have her call in readings in 2 weeks

## 2021-04-21 ENCOUNTER — TELEPHONE (OUTPATIENT)
Dept: NEPHROLOGY | Facility: CLINIC | Age: 61
End: 2021-04-21

## 2021-04-21 DIAGNOSIS — F31.81 BIPOLAR 2 DISORDER (HCC): Chronic | ICD-10-CM

## 2021-04-21 DIAGNOSIS — M46.1 BILATERAL SACROILIITIS (HCC): ICD-10-CM

## 2021-04-21 RX ORDER — TRAMADOL HYDROCHLORIDE 50 MG/1
TABLET ORAL
Qty: 120 TABLET | Refills: 0 | Status: SHIPPED | OUTPATIENT
Start: 2021-04-21 | End: 2021-05-21 | Stop reason: SDUPTHER

## 2021-04-21 RX ORDER — CLONAZEPAM 0.5 MG/1
0.5 TABLET ORAL 3 TIMES DAILY
Qty: 270 TABLET | Refills: 0 | Status: SHIPPED | OUTPATIENT
Start: 2021-04-21 | End: 2021-08-02 | Stop reason: SDUPTHER

## 2021-04-22 ENCOUNTER — HOSPITAL ENCOUNTER (OUTPATIENT)
Dept: RADIOLOGY | Facility: HOSPITAL | Age: 61
Discharge: HOME | End: 2021-04-22
Attending: INTERNAL MEDICINE
Payer: MEDICARE

## 2021-04-22 DIAGNOSIS — U07.1 PNEUMONIA DUE TO COVID-19 VIRUS: ICD-10-CM

## 2021-04-22 DIAGNOSIS — J12.82 PNEUMONIA DUE TO COVID-19 VIRUS: ICD-10-CM

## 2021-04-22 PROCEDURE — 71046 X-RAY EXAM CHEST 2 VIEWS: CPT

## 2021-04-23 NOTE — TELEPHONE ENCOUNTER
Patient calling saying she has been on Prudence Mura and she feels it is not helping her as good as the Symbicort did  [facility] to be [facility] out by Dr Laura Pennington

## 2021-04-26 ENCOUNTER — TELEPHONE (OUTPATIENT)
Dept: FAMILY MEDICINE CLINIC | Facility: HOSPITAL | Age: 61
End: 2021-04-26

## 2021-04-26 DIAGNOSIS — J12.82 PNEUMONIA DUE TO COVID-19 VIRUS: Primary | ICD-10-CM

## 2021-04-26 DIAGNOSIS — U07.1 PNEUMONIA DUE TO COVID-19 VIRUS: Primary | ICD-10-CM

## 2021-04-26 NOTE — TELEPHONE ENCOUNTER
----- Message from Wilmer Donato DO sent at 4/25/2021  1:35 PM EDT -----  cx r is showing improvement compared to January study with pneumonia from covid- now with mild changes- will have repeat cxr done in 2 months

## 2021-05-03 ENCOUNTER — OFFICE VISIT (OUTPATIENT)
Dept: PULMONOLOGY | Facility: HOSPITAL | Age: 61
End: 2021-05-03
Payer: MEDICARE

## 2021-05-03 ENCOUNTER — TELEPHONE (OUTPATIENT)
Dept: GASTROENTEROLOGY | Facility: CLINIC | Age: 61
End: 2021-05-03

## 2021-05-03 VITALS
OXYGEN SATURATION: 97 % | BODY MASS INDEX: 33.59 KG/M2 | RESPIRATION RATE: 16 BRPM | HEART RATE: 85 BPM | SYSTOLIC BLOOD PRESSURE: 150 MMHG | TEMPERATURE: 98.5 F | HEIGHT: 67 IN | DIASTOLIC BLOOD PRESSURE: 60 MMHG | WEIGHT: 214 LBS

## 2021-05-03 DIAGNOSIS — J45.40 MODERATE PERSISTENT ASTHMA WITHOUT COMPLICATION: Primary | ICD-10-CM

## 2021-05-03 PROCEDURE — 99214 OFFICE O/P EST MOD 30 MIN: CPT | Performed by: INTERNAL MEDICINE

## 2021-05-03 RX ORDER — ALBUTEROL SULFATE 90 UG/1
2 AEROSOL, METERED RESPIRATORY (INHALATION) EVERY 6 HOURS PRN
Qty: 8.5 G | Refills: 3 | Status: SHIPPED | OUTPATIENT
Start: 2021-05-03

## 2021-05-03 NOTE — TELEPHONE ENCOUNTER
Pt states she opened the last bottle of Linzess; requests more samples/will be out in a month  #  [phone]

## 2021-05-03 NOTE — ASSESSMENT & PLAN NOTE
Patient has been using generic Advair twice daily which is controlling her symptoms fairly well  I have also sent a prescription in for albuterol to use on an as-needed basis

## 2021-05-03 NOTE — TELEPHONE ENCOUNTER
[facility] contacted for next shipment  I spoke with Wili Oh, it should be arriving at office in 7-10 days

## 2021-05-03 NOTE — PROGRESS NOTES
Pulmonary Follow Up Note   Deni Vallecillo 64 y o  female MRN: [id number]  5/3/2021      Assessment/Plan: Moderate persistent asthma without complication    Patient has been using generic Advair twice daily which is controlling her symptoms fairly well  I have also sent a prescription in for albuterol to use on an as-needed basis  Visit orders:    Diagnoses and all orders for this visit:    Moderate persistent asthma without complication  -     albuterol (Ventolin HFA) 90 mcg/act inhaler; Inhale 2 puffs every 6 (six) hours as needed for wheezing or shortness of breath      History of Present Illness   HPI:  Deni Vallecillo is a 64 y o  female who  Is here today for follow-up regarding moderate persistent asthma  She has been using generic Advair  She still has occasional cough and wheeze  She does not have a rescue inhaler  She had COVID-19 in January and was treated as an outpatient  She has slowly recovered from that illness  No further cough or sputum production  No fevers or chills  Her weight is stable  Review of Systems   Constitutional: Negative for chills, fever and unexpected weight change  HENT: Negative for postnasal drip and sore throat  Eyes: Negative for visual disturbance  Respiratory:        As noted in HPI   Cardiovascular: Negative for chest pain  Gastrointestinal: Negative for abdominal pain, diarrhea and vomiting  Musculoskeletal: Negative for arthralgias  Skin: Negative for rash  Neurological: Negative for headaches  Hematological: Negative for adenopathy  Psychiatric/Behavioral: Negative  All other systems reviewed and are negative          Medical, Family and Social history reviewed and updated as appropriate    Historical Information   Past Medical History:   Diagnosis Date    Anxiety     Anxiety disorder     Bipolar 2 disorder ([facility] [address] )     Chronic back pain     Closed fracture of distal end of right fibula with routine healing 11/4/2020    COVID-19     in 2021    CVA ([facility] accident[facility])     noted on MRI in the past    Depression     GERD (gastroesophageal reflux disease)     Hypercholesteremia     Hypernatremia     Hypertension     Hypokalemia     Idiopathic chronic pancreatitis ([facility] [address] ) 3/20/2018    Intervertebral disc disorder with radiculopathy of lumbosacral region     resolved: 2015    Kidney disease     Panic attacks     Pericardial effusion     Radiculitis     resolved: 2015    Secondary renal hyperparathyroidism ([facility] [address] )     Vitamin D deficiency      Past Surgical History:   Procedure Laterality Date    BUNIONECTOMY      Left foot     COLON SURGERY      COLONOSCOPY  2018    DILATION AND CURETTAGE OF UTERUS      INDUCED       surgically induced    RI ANASTOMOSIS,AV,ANY SITE Left 2019    Procedure: CREATION FISTULA ARTERIOVENOUS (AV) left wrists possible left upper;  Surgeon: Nazanin Malloy MD;  [facility];  Service: Vascular    RI CORRJ HALLUX VALGUS W/SESMDC W/DIST Cathie  Left 2019    Procedure: Xavier Anders;  Surgeon: Cal Nelson DPM;  [facility];  Service: Podiatry    RI [place] W/SESMDC W/DIST Cathie  Right 8/3/2020    Procedure: Glen Luu;  Surgeon: Cal Nelson DPM;  [facility];  Service: Podiatry    RI ERCP DX COLLECTION SPECIMEN BRUSHING/WASHING N/A 2018    Procedure: ENDOSCOPIC RETROGRADE CHOLANGIOPANCREATOGRAPHY (ERCP);   Surgeon: Rissa Martinez MD;  [facility];  Service: Gastroenterology    RI LAP, SURG PROCTOPEXY N/A 2018    Procedure: ROBOTIC SIGMOID RESECTION / RECTOPEXY;  Surgeon: Sabiha Anthony MD;  [facility];  Service: Colorectal    RI SIGMOIDOSCOPY FLX DX W/COLLJ SPEC BR/WA IF PFRMD N/A 2018    Procedure: Chuck Goss;  Surgeon: Sabiha Anthony MD;  [facility];  Service: Colorectal    TUBAL LIGATION Bilateral [facility] GUIDED THYROID BIOPSY  7/30/2019     Family History   Problem Relation Age of Onset    Bipolar disorder Mother     Mental illness Mother         depression    Stroke Mother     Dementia Mother     Colon polyps Mother     Heart disease Father     Hypertension Father     Diabetes Father     Other Family         Back disorder    Diabetes Family     Heart disease Family     Hypertension Family     Breast cancer Family     Stroke Family     Thyroid disease Family     Breast cancer Paternal Grandmother     Breast cancer Paternal [de-identified]     Breast cancer Maternal Aunt     Mental illness Sister     Colon polyps Sister     Mental illness Sister     Heart disease Sister     Breast cancer Sister     No Known Problems Sister     Other Son         pituitary tumor    Hypertension Son     Obesity Son     No Known Problems Son     Substance Abuse Neg Hx         neg fam hx    Colon cancer Neg Hx        Social History     Tobacco Use   Smoking Status Never Smoker   Smokeless Tobacco Never Used         Meds/Allergies     Current Outpatient Medications:     acetaminophen (TYLENOL) 325 mg tablet, Take 650 mg by mouth every 6 (six) hours as needed for mild pain, Disp: , Rfl:     AMILoride 5 mg tablet, Take 1 tablet by mouth twice daily, Disp: 120 tablet, Rfl: 0    aspirin (ECOTRIN LOW STRENGTH) 81 mg EC tablet, Take 1 tablet (81 mg total) by mouth daily, Disp: 30 tablet, Rfl: 0    atorvastatin (LIPITOR) 40 mg tablet, Take 1 tablet (40 mg total) by mouth daily, Disp: 30 tablet, Rfl: 11    carvedilol (COREG) 3 125 mg tablet, Take 2 tablets (6 25 mg total) by mouth 2 (two) times a day with meals, Disp: 90 tablet, Rfl: 3    clonazePAM (KlonoPIN) 0 5 mg tablet, Take 1 tablet (0 5 mg total) by mouth 3 (three) times a day, Disp: 270 tablet, Rfl: 0    fluocinonide (LIDEX) 0 05 % ointment, Apply topically 2 (two) times a day, Disp: 60 g, Rfl: 1    fluticasone-salmeterol (Wixela Inhub) 250-50 mcg/dose inhaler, Inhale 1 puff 2 (two) times a day Rinse mouth after use , Disp: 1 Inhaler, Rfl: 5    fluvoxaMINE (LUVOX) 100 mg tablet, 3 at bedtime, Disp: , Rfl:     HYDROcodone-acetaminophen (NORCO) 5-325 mg per tablet, Take 1 tablet by mouth every 6 (six) hours as needed for painMax Daily Amount: 4 tablets, Disp: 20 tablet, Rfl: 0    lamoTRIgine (LaMICtal) 200 MG tablet, Take 400 mg by mouth daily at bedtime , Disp: , Rfl:     naloxone (NARCAN) 4 mg/0 1 mL nasal spray, Administer 1 spray into a nostril   If breathing does not return to normal or if breathing difficulty resumes after 2-3 minutes, give another dose in the other nostril using a new spray , Disp: 1 each, Rfl: 1    omeprazole (PriLOSEC) 20 mg delayed release capsule, Take 1 capsule (20 mg total) by mouth daily at bedtime, Disp: 90 capsule, Rfl: 3    ondansetron (ZOFRAN-ODT) 4 mg disintegrating tablet, DISSOLVE 1 TABLET IN MOUTH EVERY 8 HOURS AS NEEDED FOR NAUSEA AND VOMITING, Disp: 60 tablet, Rfl: 0    Polyethylene Glycol 3350 (MIRALAX PO), Take by mouth as needed , Disp: , Rfl:     QUEtiapine (SEROquel) 100 mg tablet, Take 1 tablet by mouth daily at bedtime 1 at bedtime in addition to 400 mg tab, Disp: , Rfl:     QUEtiapine (SEROQUEL) 300 mg tablet, Take 1 tablet (300 mg total) by mouth every morning 1 in the AM     ( also takes 400 mg at bedtime), Disp: 90 tablet, Rfl: 3    QUEtiapine (SEROquel) 400 MG tablet, 1 at bedtime     (also takes 100 mg and 300 mg tabs), Disp: , Rfl:     traMADol (ULTRAM) 50 mg tablet, Take 2 tabs by mouth twice daily, Disp: 120 tablet, Rfl: 0    albuterol (Ventolin HFA) 90 mcg/act inhaler, Inhale 2 puffs every 6 (six) hours as needed for wheezing or shortness of breath, Disp: 8 5 g, Rfl: 3    linaCLOtide (Linzess) 290 MCG CAPS, Take 1 capsule by mouth daily, Disp: 90 capsule, Rfl: 3  Allergies   Allergen Reactions    Pollen Extract Nasal Congestion       Vitals: Blood pressure 150/60, pulse 85, temperature 98 5 °F (36 9 °C), temperature source Tympanic, resp  rate 16, height 5' 7" (1 702 m), weight 97 1 kg (214 lb), SpO2 97 %, not currently breastfeeding  Body mass index is 33 52 kg/m²  Oxygen Therapy  SpO2: 97 %    Physical Exam   Physical Exam  Constitutional:       General: She is not in acute distress  HENT:      Head: Normocephalic  Eyes:      General: No scleral icterus  Neck:      Musculoskeletal: Neck supple  Vascular: No JVD  Cardiovascular:      Rate and Rhythm: Normal rate and regular rhythm  Pulmonary:      Breath sounds: No wheezing or rhonchi  Lymphadenopathy:      Cervical: No cervical adenopathy  Skin:     General: Skin is warm and dry  Neurological:      Mental Status: She is alert and oriented to person, place, and time  Psychiatric:         Mood and Affect: Mood normal          Behavior: Behavior normal          Labs: I have personally reviewed pertinent lab results  Lab Results   Component Value Date    WBC 3 99 (L) 02/05/2021    HGB 11 4 (L) 02/05/2021    HCT 37 1 02/05/2021     (H) 02/05/2021     02/05/2021     Lab Results   Component Value Date    CALCIUM 10 0 02/05/2021     02/27/2017    K 5 1 02/05/2021    CO2 25 02/05/2021     (H) 02/05/2021    BUN 26 (H) 02/05/2021    CREATININE 1 78 (H) 02/05/2021     Lab Results   Component Value Date    IGE 23 5 09/06/2019     Lab Results   Component Value Date    ALT 22 02/05/2021    AST 20 02/05/2021    ALKPHOS 282 (H) 02/05/2021       Imaging and other studies: I have personally reviewed pertinent reports  and I have personally reviewed pertinent films in PACS   Chest x-ray on 4/22/21 shows improved bilateral ground-glass opacities when compared with January 2021    Pulmonary function testing:  Performed  8/12/19 and personally interpreted  FEV1/FVC ratio 85%   FEV1 115% predicted  % predicted  No response to bronchodilators   % predicted   % predicted  DLCO corrected for hemoglobin 74 % predicted  PFTs with severe air trapping and normal diffusion capacity

## 2021-05-05 ENCOUNTER — IMMUNIZATIONS (OUTPATIENT)
Dept: FAMILY MEDICINE CLINIC | Facility: HOSPITAL | Age: 61
End: 2021-05-05

## 2021-05-05 DIAGNOSIS — J45.40 MODERATE PERSISTENT ASTHMA WITHOUT COMPLICATION: Primary | ICD-10-CM

## 2021-05-05 DIAGNOSIS — Z23 ENCOUNTER FOR IMMUNIZATION: Primary | ICD-10-CM

## 2021-05-05 PROCEDURE — 91300 SARS-COV-2 / COVID-19 MRNA VACCINE (PFIZER-BIONTECH) 30 MCG: CPT

## 2021-05-05 PROCEDURE — 0001A SARS-COV-2 / COVID-19 MRNA VACCINE (PFIZER-BIONTECH) 30 MCG: CPT

## 2021-05-05 RX ORDER — BUDESONIDE AND FORMOTEROL FUMARATE DIHYDRATE 160; 4.5 UG/1; UG/1
2 AEROSOL RESPIRATORY (INHALATION) 2 TIMES DAILY
Qty: 30.6 G | Refills: 3 | Status: SHIPPED | OUTPATIENT
Start: 2021-05-05 | End: 2022-01-03 | Stop reason: SDUPTHER

## 2021-05-05 NOTE — TELEPHONE ENCOUNTER
Received request for prescription for Symbicort 160 from [address] [place] [address]   Script was printed and sent to [phone] [employer] prescription savings program

## 2021-05-06 ENCOUNTER — APPOINTMENT (OUTPATIENT)
Dept: RADIOLOGY | Facility: CLINIC | Age: 61
End: 2021-05-06
Payer: MEDICARE

## 2021-05-06 ENCOUNTER — TELEPHONE (OUTPATIENT)
Dept: PULMONOLOGY | Facility: CLINIC | Age: 61
End: 2021-05-06

## 2021-05-06 ENCOUNTER — OFFICE VISIT (OUTPATIENT)
Dept: OBGYN CLINIC | Facility: CLINIC | Age: 61
End: 2021-05-06
Payer: MEDICARE

## 2021-05-06 VITALS
DIASTOLIC BLOOD PRESSURE: 94 MMHG | SYSTOLIC BLOOD PRESSURE: 155 MMHG | WEIGHT: 214 LBS | BODY MASS INDEX: 33.59 KG/M2 | HEART RATE: 94 BPM | TEMPERATURE: 98 F | HEIGHT: 67 IN

## 2021-05-06 DIAGNOSIS — S99.922A INJURY OF SMALL TOE, LEFT, INITIAL ENCOUNTER: ICD-10-CM

## 2021-05-06 DIAGNOSIS — M17.12 PRIMARY OSTEOARTHRITIS OF LEFT KNEE: ICD-10-CM

## 2021-05-06 DIAGNOSIS — S92.515A CLOSED NONDISPLACED FRACTURE OF PROXIMAL PHALANX OF LESSER TOE OF LEFT FOOT, INITIAL ENCOUNTER: Primary | ICD-10-CM

## 2021-05-06 PROCEDURE — 99213 OFFICE O/P EST LOW 20 MIN: CPT | Performed by: ORTHOPAEDIC SURGERY

## 2021-05-06 PROCEDURE — 73660 X-RAY EXAM OF TOE(S): CPT

## 2021-05-06 PROCEDURE — 28510 TREATMENT OF TOE FRACTURE: CPT | Performed by: ORTHOPAEDIC SURGERY

## 2021-05-06 NOTE — PROGRESS NOTES
Assessment:     1  Closed nondisplaced fracture of proximal phalanx of lesser toe of left foot, initial encounter    2  Primary osteoarthritis of left knee        Plan:     Problem List Items Addressed This Visit        Musculoskeletal and Integument    Primary osteoarthritis of left knee    Relevant Orders    Injection procedure prior authorization    Closed nondisplaced fracture of proximal phalanx of lesser toe of left foot - Primary    Relevant Orders    XR toe left fifth min 2 views    Post Op Shoe    Fracture / Dislocation Treatment (Completed)           The patient was seen and examined by Dr Lucita Garber and myself  Findings consistent with left nondisplaced proximal phalanx fracture of the small toe and left knee osteoarthritis  Findings and treatment options were discussed with the patient  Recommend postop shoe to the left lower extremity when ambulating  She may kaila tape the 4th and 5th toes as well  We will also order joint supplement injections for the left knee at this time  She will follow-up after insurance authorization to begin the series  We will follow up on the toe fracture at that time  All questions were answered to patient's satisfaction  Subjective:     Patient ID: Christopher Suarez is a 64 y o  female  Chief Complaint: This is a 61-year-old white female coming in for evaluation of her left small toe  She states that on May 3, 2021 she hit her left small toe directly against the leg of a table  She felt immediate pain  She has had pain with weight-bearing since  No treatment as of yet  She has also been treated for left knee patellofemoral osteoarthritis with possible lateral meniscus tear  She had a 2nd opinion by Dr Marilia Moreno that agreed that she is to continue conservative treatment for the osteoarthritis with joint supplement or steroid injections  She continues to not show mechanical symptoms of the left knee    She states she continues to have aching pain in the left knee  It is occasionally sharp over the medial and lateral aspect of the knee with weight-bearing      Allergy:  Allergies   Allergen Reactions    Pollen Extract Nasal Congestion     Medications:  all current active meds have been reviewed  Past Medical History:  Past Medical History:   Diagnosis Date    Anxiety     Anxiety disorder     Bipolar 2 disorder ([facility] [address] )     Chronic back pain     Closed fracture of distal end of right fibula with routine healing 2020    COVID-19     in 2021    CVA ([facility])     noted on MRI in the past    Depression     GERD (gastroesophageal reflux disease)     Hypercholesteremia     Hypernatremia     Hypertension     Hypokalemia     Idiopathic chronic pancreatitis ([facility] [address] ) 3/20/2018    Intervertebral disc disorder with radiculopathy of lumbosacral region     resolved: 2015    Kidney disease     Panic attacks     Pericardial effusion     Radiculitis     resolved: 2015    Secondary renal hyperparathyroidism ([facility] [address] )     Vitamin D deficiency      Past Surgical History:  Past Surgical History:   Procedure Laterality Date    BUNIONECTOMY      Left foot     COLON SURGERY      COLONOSCOPY  2018    DILATION AND CURETTAGE OF UTERUS      INDUCED       surgically induced    NE ANASTOMOSIS,AV,ANY SITE Left 2019    Procedure: CREATION FISTULA ARTERIOVENOUS (AV) left wrists possible left upper;  Surgeon: Nazanin Malloy MD;  [facility];  Service: Vascular    NE CORRJ HALLUX VALGUS W/SESMDC W/DIST METAR OSTEOT Left 2019    Procedure: Xavier Anders;  Surgeon: Cal Nelson DPM;  [facility];  Service: Podiatry    NE [place] W/SESMDC W/DIST [place]y  Right 8/3/2020    Procedure: Glen Luu;  Surgeon: Cal Nelson DPM;  [facility];  Service: Podiatry    NE ERCP DX COLLECTION SPECIMEN BRUSHING/WASHING N/A 2018    Procedure: ENDOSCOPIC RETROGRADE CHOLANGIOPANCREATOGRAPHY (ERCP); Surgeon: Rissa Martinez MD;  [facility];  Service: Gastroenterology    MS LAP, SURG PROCTOPEXY N/A 7/13/2018    Procedure: ROBOTIC SIGMOID RESECTION / RECTOPEXY;  Surgeon: Sabiha Anthony MD;  [facility];  Service: Colorectal    MS SIGMOIDOSCOPY FLX DX W/COLLJ SPEC BR/WA IF PFRMD N/A 7/13/2018    Procedure: Chuck Goss;  Surgeon: Sabiha Anthony MD;  [facility];  Service: Colorectal    TUBAL LIGATION Bilateral 1997   [facility] THYROID BIOPSY  7/30/2019     Family History:  Family History   Problem Relation Age of Onset    Bipolar disorder Mother     Mental illness Mother         depression    Stroke Mother     Dementia Mother     Colon polyps Mother     Heart disease Father     Hypertension Father     Diabetes Father     Other Family         Back disorder    Diabetes Family     Heart disease Family     Hypertension Family     Breast cancer Family     Stroke Family     Thyroid disease Family     Breast cancer Paternal Grandmother     Breast cancer Paternal [de-identified]     Breast cancer Maternal Aunt     Mental illness Sister     Colon polyps Sister     Mental illness Sister     Heart disease Sister     Breast cancer Sister     No Known Problems Sister     Other Son         pituitary tumor    Hypertension Son     Obesity Son     No Known Problems Son     Substance Abuse Neg Hx         neg fam hx    Colon cancer Neg Hx      Social History:  Social History     Substance and Sexual Activity   Alcohol Use Never    Frequency: Never    Binge frequency: Never     Social History     Substance and Sexual Activity   Drug Use Not Currently    Types: Marijuana     Social History     Tobacco Use   Smoking Status Never Smoker   Smokeless Tobacco Never Used     Review of Systems   Constitutional: Negative  HENT: Negative  Eyes: Negative  Respiratory: Negative  Cardiovascular: Negative      Gastrointestinal: Negative  Endocrine: Negative  Genitourinary: Negative  Musculoskeletal: Positive for arthralgias  Skin: Negative  Allergic/Immunologic: Negative  Neurological: Negative  Hematological: Negative  Psychiatric/Behavioral: Negative  Objective:  BP Readings from Last 1 Encounters:   05/06/21 155/94      Wt Readings from Last 1 Encounters:   05/06/21 97 1 kg (214 lb)      BMI:   Estimated body mass index is 33 52 kg/m² as calculated from the following:    Height as of this encounter: 5' 7" (1 702 m)  Weight as of this encounter: 97 1 kg (214 lb)  BSA:   Estimated body surface area is 2 08 meters squared as calculated from the following:    Height as of this encounter: 5' 7" (1 702 m)  Weight as of this encounter: 97 1 kg (214 lb)  Physical Exam  Constitutional:       General: She is not in acute distress  Appearance: She is well-developed  HENT:      Head: Normocephalic  Eyes:      Conjunctiva/sclera: Conjunctivae normal       Pupils: Pupils are equal, round, and reactive to light  Pulmonary:      Effort: Pulmonary effort is normal  No respiratory distress  Musculoskeletal:         General: Tenderness present  Left knee: She exhibits no effusion  Skin:     General: Skin is warm and dry  Neurological:      Mental Status: She is alert and oriented to person, place, and time  Psychiatric:         Behavior: Behavior normal        Left Ankle Exam     Range of Motion   The patient has normal left ankle ROM  Muscle Strength   The patient has normal left ankle strength  Other   Erythema: absent  Sensation: normal  Pulse: present    Comments:   Tender over proximal phalanx of small toe   nontender over 5th metatarsal   mild swelling of small toe   no ecchymosis   no obvious deformity   capillary refill bris      Left Knee Exam     Muscle Strength   The patient has normal left knee strength      Tenderness   The patient is experiencing tenderness in the medial joint line and lateral joint line  Range of Motion   The patient has normal left knee ROM  Tests   Kateryna:  Medial - negative Lateral - negative  Varus: negative Valgus: negative    Other   Erythema: absent  Sensation: normal  Pulse: present  Swelling: none  Effusion: no effusion present    Comments:   Patellofemoral crepitation            I have personally reviewed pertinent films in PACS and my interpretation is X-rays left foot reveal a nondisplaced fracture of the proximal phalanx of the small toe       Fracture / Dislocation Treatment    Date/Time: 5/6/2021 11:11 AM  Performed by: Lionel Hodgson MD  Authorized by: Lionel Hodgson MD     Verbal consent obtained?: Yes    Risks and benefits: Risks, benefits and alternatives were discussed    Consent given by:  Patient  Patient states understanding of procedure being performed: Yes    Injury location:  Toe  Location details:  Left fifth toe  Injury type:  Fracture  Fracture type: proximal phalanx    Neurovascular status: Neurovascularly intact    Distal perfusion: normal    Neurological function: normal    Range of motion: reduced    Manipulation performed?: No    Immobilization:  Other (comment) (post op shoe)  Neurovascular status: Neurovascularly intact    Distal perfusion: normal    Neurological function: normal    Range of motion: unchanged    Patient tolerance:  Patient tolerated the procedure well with no immediate complications

## 2021-05-06 NOTE — TELEPHONE ENCOUNTER
Patient calling asking if we received paperwork from [address] [place] [address] that was faxed   Please advise

## 2021-05-07 DIAGNOSIS — R53.1 GENERALIZED WEAKNESS: ICD-10-CM

## 2021-05-07 DIAGNOSIS — N39.0 URINARY TRACT INFECTION: ICD-10-CM

## 2021-05-07 RX ORDER — ONDANSETRON 4 MG/1
TABLET, ORALLY DISINTEGRATING ORAL
Qty: 180 TABLET | Refills: 0 | Status: SHIPPED | OUTPATIENT
Start: 2021-05-07 | End: 2021-05-12 | Stop reason: SDUPTHER

## 2021-05-07 NOTE — TELEPHONE ENCOUNTER
Attempted to reach pt, lvm, pt advised that the script was sent in that was requested by [address] [place] [address] Edith

## 2021-05-07 NOTE — TELEPHONE ENCOUNTER
Pt states she needs a refill for ondansetron (ZOFRAN-ODT) 4 mg disintegrating tablet sent to [employer] [facility]

## 2021-05-09 DIAGNOSIS — I10 ESSENTIAL HYPERTENSION: ICD-10-CM

## 2021-05-09 DIAGNOSIS — E23.2 DIABETES INSIPIDUS (HCC): ICD-10-CM

## 2021-05-09 DIAGNOSIS — N18.30 CHRONIC KIDNEY DISEASE, STAGE 3 (HCC): ICD-10-CM

## 2021-05-09 DIAGNOSIS — N25.81 SECONDARY RENAL HYPERPARATHYROIDISM (HCC): ICD-10-CM

## 2021-05-09 DIAGNOSIS — N28.1 RENAL CYST: ICD-10-CM

## 2021-05-10 ENCOUNTER — TELEPHONE (OUTPATIENT)
Dept: OBGYN CLINIC | Facility: HOSPITAL | Age: 61
End: 2021-05-10

## 2021-05-10 ENCOUNTER — TELEPHONE (OUTPATIENT)
Dept: NEPHROLOGY | Facility: CLINIC | Age: 61
End: 2021-05-10

## 2021-05-10 DIAGNOSIS — N25.81 SECONDARY RENAL HYPERPARATHYROIDISM (HCC): ICD-10-CM

## 2021-05-10 DIAGNOSIS — E23.2 DIABETES INSIPIDUS (HCC): ICD-10-CM

## 2021-05-10 DIAGNOSIS — N28.1 RENAL CYST: ICD-10-CM

## 2021-05-10 DIAGNOSIS — N18.30 CHRONIC KIDNEY DISEASE, STAGE 3 (HCC): ICD-10-CM

## 2021-05-10 DIAGNOSIS — M17.11 PRIMARY OSTEOARTHRITIS OF RIGHT KNEE: Primary | ICD-10-CM

## 2021-05-10 DIAGNOSIS — I10 ESSENTIAL HYPERTENSION: ICD-10-CM

## 2021-05-10 RX ORDER — CARVEDILOL 3.12 MG/1
3.12 TABLET ORAL 2 TIMES DAILY WITH MEALS
Qty: 360 TABLET | Refills: 3 | Status: SHIPPED | OUTPATIENT
Start: 2021-05-10 | End: 2021-06-23 | Stop reason: SDUPTHER

## 2021-05-10 RX ORDER — CARVEDILOL 3.12 MG/1
TABLET ORAL
Qty: 90 TABLET | Refills: 0 | Status: SHIPPED | OUTPATIENT
Start: 2021-05-10 | End: 2021-05-10

## 2021-05-10 NOTE — TELEPHONE ENCOUNTER
Received a call from Kerwin with [address] [place] [address] asking if we could change prescription order to 120 tablets in order to have an even 30 day supply for the patient   Pharmacy call back number is [phone]

## 2021-05-11 NOTE — TELEPHONE ENCOUNTER
I put in the order for right knee  Can she start them this week when she comes in for left knee?
Patient is coming in for gel injections this week on the left knee  Patient has been seen for the right knee, which has been hurting more lately and is wondering if she can get both done if it's buy and bill  She was seen for the right knee in Feb 2021      Callback ph#[phone]
Yes, she has already been scheduled as well for the rest of her injections! Thank you!
No

## 2021-05-12 DIAGNOSIS — R53.1 GENERALIZED WEAKNESS: ICD-10-CM

## 2021-05-12 DIAGNOSIS — N39.0 URINARY TRACT INFECTION: ICD-10-CM

## 2021-05-12 RX ORDER — ONDANSETRON 4 MG/1
TABLET, ORALLY DISINTEGRATING ORAL
Qty: 180 TABLET | Refills: 0 | Status: SHIPPED | OUTPATIENT
Start: 2021-05-12 | End: 2021-12-14 | Stop reason: SDUPTHER

## 2021-05-13 ENCOUNTER — OFFICE VISIT (OUTPATIENT)
Dept: OBGYN CLINIC | Facility: CLINIC | Age: 61
End: 2021-05-13
Payer: MEDICARE

## 2021-05-13 VITALS
WEIGHT: 214 LBS | BODY MASS INDEX: 33.59 KG/M2 | DIASTOLIC BLOOD PRESSURE: 84 MMHG | SYSTOLIC BLOOD PRESSURE: 148 MMHG | HEIGHT: 67 IN | TEMPERATURE: 97.8 F

## 2021-05-13 DIAGNOSIS — M17.12 PRIMARY OSTEOARTHRITIS OF LEFT KNEE: ICD-10-CM

## 2021-05-13 DIAGNOSIS — M17.11 PRIMARY OSTEOARTHRITIS OF RIGHT KNEE: Primary | ICD-10-CM

## 2021-05-13 PROCEDURE — 20610 DRAIN/INJ JOINT/BURSA W/O US: CPT | Performed by: PHYSICIAN ASSISTANT

## 2021-05-13 NOTE — PROGRESS NOTES
Assessment:     1  Primary osteoarthritis of right knee    2  Primary osteoarthritis of left knee        Plan:     Problem List Items Addressed This Visit        Musculoskeletal and Integument    Primary osteoarthritis of right knee - Primary    Relevant Medications    sodium hyaluronate (ORTHOVISC) injection SOSY 30 mg (Completed)    Other Relevant Orders    Large joint arthrocentesis: R knee (Completed)    Primary osteoarthritis of left knee    Relevant Medications    sodium hyaluronate (ORTHOVISC) injection SOSY 30 mg (Completed)    Other Relevant Orders    Large joint arthrocentesis: L knee (Completed)            Findings consistent with bilateral knee osteoarthritis  Findings and treatment options were discussed with the patient  The 1st of 3 bilateral knee Orthovisc injections was given today  She tolerated the procedures well  Advised to apply cold compress today  Follow-up in 1 week for the 2nd injections  All questions were answered to patient's satisfaction  Subjective:     Patient ID: Jacque Rico is a 64 y o  female  Chief Complaint: This is a 53-year-old white female following up for bilateral knee osteoarthritis  She is here to begin a series of Orthovisc injections  She continues to have dull aching pain in her knees  Left is more symptomatic than right      Allergy:  Allergies   Allergen Reactions    Pollen Extract Nasal Congestion     Medications:  all current active meds have been reviewed  Past Medical History:  Past Medical History:   Diagnosis Date    Anxiety     Anxiety disorder     Bipolar 2 disorder ([facility] [address] )     Chronic back pain     Closed fracture of distal end of right fibula with routine healing 11/4/2020    COVID-19     in Jan 2021    [facility])     noted on MRI in the past    Depression     GERD (gastroesophageal reflux disease)     Hypercholesteremia     Hypernatremia     Hypertension     Hypokalemia     Idiopathic chronic pancreatitis ([facility] [address] ) 3/20/2018    Intervertebral disc disorder with radiculopathy of lumbosacral region     resolved: 2015    Kidney disease     Panic attacks     Pericardial effusion     Radiculitis     resolved: 2015    Secondary renal hyperparathyroidism ([facility] [address] )     Vitamin D deficiency      Past Surgical History:  Past Surgical History:   Procedure Laterality Date    BUNIONECTOMY      Left foot     COLON SURGERY      COLONOSCOPY  2018    DILATION AND CURETTAGE OF UTERUS      INDUCED       surgically induced    VT ANASTOMOSIS,AV,ANY SITE Left 2019    Procedure: CREATION FISTULA ARTERIOVENOUS (AV) left wrists possible left upper;  Surgeon: Rosette Pérez MD;  [facility];  Service: Vascular    [place] W/SESMDC W/DIST [place] Left 2019    Procedure: Clint Bronson;  Surgeon: Moses Michael DPM;  [facility];  Service: [address] [place] [address] W/SESMDC W/DIST [place] Right 8/3/2020    Procedure: Rayyue Melo;  Surgeon: Moses Michael DPM;  [facility];  Service: Podiatry    VT ERCP DX COLLECTION SPECIMEN BRUSHING/WASHING N/A 2018    Procedure: ENDOSCOPIC RETROGRADE CHOLANGIOPANCREATOGRAPHY (ERCP);   Surgeon: Jose Rodriguez MD;  [facility];  Service: Gastroenterology    VT LAP, SURG PROCTOPEXY N/A 2018    Procedure: ROBOTIC SIGMOID RESECTION / RECTOPEXY;  Surgeon: Marko Gongora MD;  [facility];  Service: Colorectal    VT SIGMOIDOSCOPY FLX DX W/COLLJ SPEC BR/WA IF PFRMD N/A 2018    Procedure: Valentine Mullen;  Surgeon: Marko Gongora MD;  [facility];  Service: Colorectal    TUBAL LIGATION Bilateral    [facility] THYROID BIOPSY  2019     Family History:  Family History   Problem Relation Age of Onset    Bipolar disorder Mother     Mental illness Mother         depression    Stroke Mother     Dementia Mother     Colon polyps Mother     Heart disease Father     Hypertension Father     Diabetes Father     Other Family         Back disorder    Diabetes Family     Heart disease Family     Hypertension Family     Breast cancer Family     Stroke Family     Thyroid disease Family     Breast cancer Paternal Grandmother     Breast cancer Paternal [de-identified]     Breast cancer Maternal Aunt     Mental illness Sister     Colon polyps Sister     Mental illness Sister     Heart disease Sister     Breast cancer Sister     No Known Problems Sister     Other Son         pituitary tumor    Hypertension Son     Obesity Son     No Known Problems Son     Substance Abuse Neg Hx         neg fam hx    Colon cancer Neg Hx      Social History:  Social History     Substance and Sexual Activity   Alcohol Use Never    Frequency: Never    Binge frequency: Never     Social History     Substance and Sexual Activity   Drug Use Not Currently    Types: Marijuana     Social History     Tobacco Use   Smoking Status Never Smoker   Smokeless Tobacco Never Used     Review of Systems   Constitutional: Negative  HENT: Negative  Eyes: Negative  Respiratory: Negative  Cardiovascular: Negative  Gastrointestinal: Negative  Endocrine: Negative  Genitourinary: Negative  Musculoskeletal: Positive for arthralgias  Skin: Negative  Allergic/Immunologic: Negative  Neurological: Negative  Hematological: Negative  Psychiatric/Behavioral: Negative  Objective:  BP Readings from Last 1 Encounters:   05/13/21 148/84      Wt Readings from Last 1 Encounters:   05/13/21 97 1 kg (214 lb)      BMI:   Estimated body mass index is 33 52 kg/m² as calculated from the following:    Height as of this encounter: 5' 7" (1 702 m)  Weight as of this encounter: 97 1 kg (214 lb)  BSA:   Estimated body surface area is 2 08 meters squared as calculated from the following:    Height as of this encounter: 5' 7" (1 702 m)      Weight as of this encounter: 97 1 kg (214 lb)  Physical Exam  Constitutional:       General: She is not in acute distress  Appearance: She is well-developed  HENT:      Head: Normocephalic  Eyes:      Conjunctiva/sclera: Conjunctivae normal       Pupils: Pupils are equal, round, and reactive to light  Pulmonary:      Effort: Pulmonary effort is normal  No respiratory distress  Musculoskeletal:         General: Tenderness present  Right knee: She exhibits no effusion  Left knee: She exhibits no effusion  Skin:     General: Skin is warm and dry  Neurological:      Mental Status: She is alert and oriented to person, place, and time  Psychiatric:         Behavior: Behavior normal        Right Knee Exam     Muscle Strength   The patient has normal right knee strength  Tenderness   The patient is experiencing no tenderness  Range of Motion   The patient has normal right knee ROM  Tests   Kateryna:  Medial - negative Lateral - negative  Varus: negative Valgus: negative    Other   Erythema: absent  Sensation: normal  Pulse: present  Swelling: none  Effusion: no effusion present    Comments:  Patellofemoral crepitation      Left Knee Exam     Muscle Strength   The patient has normal left knee strength  Tenderness   The patient is experiencing tenderness in the medial joint line and lateral joint line  Range of Motion   The patient has normal left knee ROM  Tests   Kateryna:  Medial - negative Lateral - negative  Varus: negative Valgus: negative    Other   Erythema: absent  Sensation: normal  Pulse: present  Swelling: none  Effusion: no effusion present    Comments:   Patellofemoral crepitation            No new imaging today  Large joint arthrocentesis: R knee  Universal Protocol:  Consent: Verbal consent obtained    Risks and benefits: risks, benefits and alternatives were discussed  Consent given by: patient  Patient understanding: patient states understanding of the procedure being performed    Supporting Documentation  Indications: pain   Procedure Details  Location: knee - R knee  Preparation: Patient was prepped and draped in the usual sterile fashion  Needle size: 22 G  Approach: anterolateral  Medications administered: 30 mg sodium hyaluronate 30 mg/2 mL    Patient tolerance: patient tolerated the procedure well with no immediate complications  Dressing:  Sterile dressing applied    Large joint arthrocentesis: L knee  Universal Protocol:  Consent: Verbal consent obtained    Risks and benefits: risks, benefits and alternatives were discussed  Consent given by: patient  Patient understanding: patient states understanding of the procedure being performed    Supporting Documentation  Indications: pain   Procedure Details  Location: knee - L knee  Preparation: Patient was prepped and draped in the usual sterile fashion  Needle size: 22 G  Approach: anterolateral  Medications administered: 30 mg sodium hyaluronate 30 mg/2 mL    Patient tolerance: patient tolerated the procedure well with no immediate complications  Dressing:  Sterile dressing applied

## 2021-05-20 ENCOUNTER — OFFICE VISIT (OUTPATIENT)
Dept: OBGYN CLINIC | Facility: CLINIC | Age: 61
End: 2021-05-20
Payer: MEDICARE

## 2021-05-20 VITALS
WEIGHT: 214 LBS | TEMPERATURE: 98.2 F | SYSTOLIC BLOOD PRESSURE: 122 MMHG | BODY MASS INDEX: 33.59 KG/M2 | DIASTOLIC BLOOD PRESSURE: 72 MMHG | HEIGHT: 67 IN

## 2021-05-20 DIAGNOSIS — M17.11 PRIMARY OSTEOARTHRITIS OF RIGHT KNEE: Primary | ICD-10-CM

## 2021-05-20 DIAGNOSIS — M17.12 PRIMARY OSTEOARTHRITIS OF LEFT KNEE: ICD-10-CM

## 2021-05-20 PROCEDURE — 20610 DRAIN/INJ JOINT/BURSA W/O US: CPT | Performed by: PHYSICIAN ASSISTANT

## 2021-05-20 NOTE — PROGRESS NOTES
Assessment:     1  Primary osteoarthritis of right knee    2  Primary osteoarthritis of left knee        Plan:     Problem List Items Addressed This Visit        Musculoskeletal and Integument    Primary osteoarthritis of right knee - Primary    Relevant Medications    sodium hyaluronate (ORTHOVISC) injection SOSY 30 mg (Completed)    Other Relevant Orders    Large joint arthrocentesis: R knee (Completed)    Primary osteoarthritis of left knee    Relevant Medications    sodium hyaluronate (ORTHOVISC) injection SOSY 30 mg (Completed)    Other Relevant Orders    Large joint arthrocentesis: L knee (Completed)            Findings consistent with bilateral knee osteoarthritis  Findings and treatment options were discussed with the patient  The 2nd of 3 bilateral knee Orthovisc injections was given today  She tolerated the procedures well  Advised to apply cold compress today  Follow-up in 1 week for the 3rd injections  All questions were answered to patient's satisfaction  Subjective:     Patient ID: Deni Vallecillo is a 64 y o  female  Chief Complaint: This is a 68-year-old white female following up for bilateral knee osteoarthritis  She is here for the 2nd of 3 bilateral knee Orthovisc injections  No issues after the 1st injections      Allergy:  Allergies   Allergen Reactions    Pollen Extract Nasal Congestion     Medications:  all current active meds have been reviewed  Past Medical History:  Past Medical History:   Diagnosis Date    Anxiety     Anxiety disorder     Bipolar [facility] disorder ([facility] [address] )     Chronic back pain     Closed fracture of distal end of right fibula with routine healing 11/4/2020    COVID-19     in Jan 2021    [facility])     noted on MRI in the past    Depression     GERD (gastroesophageal reflux disease)     Hypercholesteremia     Hypernatremia     Hypertension     Hypokalemia     Idiopathic chronic pancreatitis ([facility] [address] ) 3/20/2018    Intervertebral disc disorder with radiculopathy of lumbosacral region     resolved: 2015    Kidney disease     Panic attacks     Pericardial effusion     Radiculitis     resolved: 2015    Secondary renal hyperparathyroidism ([facility] [address] )     Vitamin D deficiency      Past Surgical History:  Past Surgical History:   Procedure Laterality Date    BUNIONECTOMY      Left foot     COLON SURGERY      COLONOSCOPY  2018    DILATION AND CURETTAGE OF UTERUS      INDUCED       surgically induced    OR ANASTOMOSIS,AV,ANY SITE Left 2019    Procedure: CREATION FISTULA ARTERIOVENOUS (AV) left wrists possible left upper;  Surgeon: rBian Parks MD;  [facility];  Service: Vascular    [place] W/SESMDC W/DIST [place] Left 2019    Procedure: Mali Fish;  Surgeon: Babs Acuna DPM;  [facility];  Service: [address] [place] [address] W/SESMDC W/DIST [place] Right 8/3/2020    Procedure: Prasanna Ron;  Surgeon: Babs Acuna DPM;  [facility];  Service: Podiatry    OR ERCP DX COLLECTION SPECIMEN BRUSHING/WASHING N/A 2018    Procedure: ENDOSCOPIC RETROGRADE CHOLANGIOPANCREATOGRAPHY (ERCP);   Surgeon: Shelley Bundy MD;  [facility];  Service: Gastroenterology    OR LAP, SURG PROCTOPEXY N/A 2018    Procedure: ROBOTIC SIGMOID RESECTION / RECTOPEXY;  Surgeon: Kendrick Ahumada, MD;  [facility];  Service: Colorectal    OR SIGMOIDOSCOPY FLX DX W/COLLJ SPEC BR/WA IF PFRMD N/A 2018    Procedure: Serenity Richardson;  Surgeon: Kendrick Ahumada, MD;  [facility];  Service: Colorectal    TUBAL LIGATION Bilateral [facility] THYROID BIOPSY  2019     Family History:  Family History   Problem Relation Age of Onset    Bipolar disorder Mother     Mental illness Mother         depression    Stroke Mother     Dementia Mother     Colon polyps Mother     Heart disease Father  Hypertension Father     Diabetes Father     Other Family         Back disorder    Diabetes Family     Heart disease Family     Hypertension Family     Breast cancer Family     Stroke Family     Thyroid disease Family     Breast cancer Paternal Grandmother     Breast cancer Paternal [de-identified]     Breast cancer Maternal Aunt     Mental illness Sister     Colon polyps Sister     Mental illness Sister     Heart disease Sister     Breast cancer Sister     No Known Problems Sister     Other Son         pituitary tumor    Hypertension Son     Obesity Son     No Known Problems Son     Substance Abuse Neg Hx         neg fam hx    Colon cancer Neg Hx      Social History:  Social History     Substance and Sexual Activity   Alcohol Use Never    Frequency: Never    Binge frequency: Never     Social History     Substance and Sexual Activity   Drug Use Not Currently    Types: Marijuana     Social History     Tobacco Use   Smoking Status Never Smoker   Smokeless Tobacco Never Used     Review of Systems   Constitutional: Negative  HENT: Negative  Eyes: Negative  Respiratory: Negative  Cardiovascular: Negative  Gastrointestinal: Negative  Endocrine: Negative  Genitourinary: Negative  Musculoskeletal: Positive for arthralgias and gait problem (post op shoe LLE)  Skin: Negative  Allergic/Immunologic: Negative  Hematological: Negative  Psychiatric/Behavioral: Negative  Objective:  BP Readings from Last 1 Encounters:   05/20/21 122/72      Wt Readings from Last 1 Encounters:   05/20/21 97 1 kg (214 lb)      BMI:   Estimated body mass index is 33 52 kg/m² as calculated from the following:    Height as of this encounter: 5' 7" (1 702 m)  Weight as of this encounter: 97 1 kg (214 lb)  BSA:   Estimated body surface area is 2 08 meters squared as calculated from the following:    Height as of this encounter: 5' 7" (1 702 m)      Weight as of this encounter: 97 1 kg (214 lb)    Physical Exam  Constitutional:       General: She is not in acute distress  Appearance: She is well-developed  HENT:      Head: Normocephalic  Eyes:      Conjunctiva/sclera: Conjunctivae normal       Pupils: Pupils are equal, round, and reactive to light  Pulmonary:      Effort: Pulmonary effort is normal  No respiratory distress  Musculoskeletal:         General: Tenderness present  Right knee: She exhibits no effusion  Left knee: She exhibits no effusion  Skin:     General: Skin is warm and dry  Neurological:      Mental Status: She is alert and oriented to person, place, and time  Psychiatric:         Behavior: Behavior normal        Right Knee Exam     Muscle Strength   The patient has normal right knee strength  Tenderness   The patient is experiencing no tenderness  Range of Motion   The patient has normal right knee ROM  Tests   Kateryna:  Medial - negative Lateral - negative  Varus: negative Valgus: negative    Other   Erythema: absent  Sensation: normal  Pulse: present  Swelling: none  Effusion: no effusion present    Comments:  Patellofemoral crepitation      Left Knee Exam     Muscle Strength   The patient has normal left knee strength  Tenderness   The patient is experiencing tenderness in the medial joint line and lateral joint line  Range of Motion   The patient has normal left knee ROM  Tests   Kateryna:  Medial - negative Lateral - negative  Varus: negative Valgus: negative    Other   Erythema: absent  Sensation: normal  Pulse: present  Swelling: none  Effusion: no effusion present    Comments:   Patellofemoral crepitation            No new imaging today  Large joint arthrocentesis: R knee  Universal Protocol:  Consent: Verbal consent obtained    Risks and benefits: risks, benefits and alternatives were discussed  Consent given by: patient  Patient understanding: patient states understanding of the procedure being performed    Supporting Documentation  Indications: pain   Procedure Details  Location: knee - R knee  Preparation: Patient was prepped and draped in the usual sterile fashion  Needle size: 22 G  Approach: anterolateral  Medications administered: 30 mg sodium hyaluronate 30 mg/2 mL    Patient tolerance: patient tolerated the procedure well with no immediate complications  Dressing:  Sterile dressing applied    Large joint arthrocentesis: L knee  Universal Protocol:  Consent: Verbal consent obtained    Risks and benefits: risks, benefits and alternatives were discussed  Consent given by: patient  Patient understanding: patient states understanding of the procedure being performed    Supporting Documentation  Indications: pain   Procedure Details  Location: knee - L knee  Preparation: Patient was prepped and draped in the usual sterile fashion  Needle size: 22 G  Approach: anterolateral  Medications administered: 30 mg sodium hyaluronate 30 mg/2 mL    Patient tolerance: patient tolerated the procedure well with no immediate complications  Dressing:  Sterile dressing applied

## 2021-05-21 DIAGNOSIS — M46.1 BILATERAL SACROILIITIS (HCC): ICD-10-CM

## 2021-05-21 DIAGNOSIS — K21.9 GASTROESOPHAGEAL REFLUX DISEASE: ICD-10-CM

## 2021-05-21 RX ORDER — TRAMADOL HYDROCHLORIDE 50 MG/1
TABLET ORAL
Qty: 120 TABLET | Refills: 0 | Status: SHIPPED | OUTPATIENT
Start: 2021-05-21 | End: 2021-09-14

## 2021-05-21 RX ORDER — OMEPRAZOLE 20 MG/1
20 CAPSULE, DELAYED RELEASE ORAL
Qty: 90 CAPSULE | Refills: 3 | Status: SHIPPED | OUTPATIENT
Start: 2021-05-21 | End: 2021-06-03 | Stop reason: SDUPTHER

## 2021-05-24 ENCOUNTER — OFFICE VISIT (OUTPATIENT)
Dept: PODIATRY | Facility: CLINIC | Age: 61
End: 2021-05-24
Payer: MEDICARE

## 2021-05-24 VITALS
HEIGHT: 67 IN | DIASTOLIC BLOOD PRESSURE: 89 MMHG | WEIGHT: 219 LBS | BODY MASS INDEX: 34.37 KG/M2 | HEART RATE: 84 BPM | SYSTOLIC BLOOD PRESSURE: 176 MMHG

## 2021-05-24 DIAGNOSIS — M20.61 ACQUIRED DEFORMITIES OF TOE, RIGHT: ICD-10-CM

## 2021-05-24 DIAGNOSIS — S92.515A NONDISPLACED FRACTURE OF PROXIMAL PHALANX OF LEFT LESSER TOE(S), INITIAL ENCOUNTER FOR CLOSED FRACTURE: Primary | ICD-10-CM

## 2021-05-24 DIAGNOSIS — M20.62 ACQUIRED DEFORMITY OF LEFT TOE: ICD-10-CM

## 2021-05-24 PROCEDURE — 99213 OFFICE O/P EST LOW 20 MIN: CPT | Performed by: PODIATRIST

## 2021-05-24 PROCEDURE — 29550 STRAPPING OF TOES: CPT | Performed by: PODIATRIST

## 2021-05-24 NOTE — PROGRESS NOTES
PATIENT:  Shai Land [facility]  1960       ASSESSMENT:     1  Nondisplaced fracture of proximal phalanx of left lesser toe(s), initial encounter for closed fracture  Splint, Casting, Strapping   2  Acquired deformity of left toe     3  Acquired deformities of toe, right            PLAN:  1  Reviewed the orthopedic note and X-ray  X-ray was reviewed with her  2  Strapping was applied to left 4th and 5th toe  Instructed daily application  Surgical shoe for off-loading  Instructed resting and elevation  3  She has angular deformity of great toes  Dispensed toe spacers / silopad for off-loading  Discussed possible surgical treatment and she would consider it in the future  4  RA in 1 month  Splint, Casting, Strapping    Date/Time: 5/24/2021 2:15 PM  Performed by: Donna Santana DPM  Authorized by: Donna Santana DPM   Universal Protocol:  Consent: Verbal consent obtained  Risks and benefits: risks, benefits and alternatives were discussed  Consent given by: patient  Time out: Immediately prior to procedure a "time out" was called to verify the correct patient, procedure, equipment, support staff and site/side marked as required  Timeout called at: 5/24/2021 2:15 PM   Patient understanding: patient states understanding of the procedure being performed  Patient identity confirmed: verbally with patient      Pre-procedure details:     Sensation:  Normal  Procedure details:     Laterality:  Left    Location:  Toe    Toe:  L fourth toe and L little toe    Strapping: yes      Supplies:  Cotton padding and elastic bandage  Post-procedure details:     Pain:  Improved    Sensation:  Normal    Patient tolerance of procedure: Tolerated well, no immediate complications          Subjective:     HPI  The patient presents for fracture on left 5th toe  She stubbed her toe about 4 weeks ago  She was seen by Dr Asim Rai, and referred to my office  Decreased pain  Still has some swelling    She also complained of angulation of great toes and they rub on her 2nd toe  No redness  The following portions of the patient's history were reviewed and updated as appropriate: allergies, current medications, past family history, past medical history, past social history, past surgical history and problem list   All pertinent labs and images were reviewed        Past Medical History  Past Medical History:   Diagnosis Date    Anxiety     Anxiety disorder     Bipolar 2 disorder ([facility] )     Chronic back pain     Closed fracture of distal end of right fibula with routine healing 2020    COVID-19     in 2021    [facility])     noted on MRI in the past    Depression     GERD (gastroesophageal reflux disease)     Hypercholesteremia     Hypernatremia     Hypertension     Hypokalemia     Idiopathic chronic pancreatitis ([facility] ) 3/20/2018    Intervertebral disc disorder with radiculopathy of lumbosacral region     resolved: 2015    Kidney disease     Panic attacks     Pericardial effusion     Radiculitis     resolved: 2015    [facility]     Vitamin D deficiency        Past Surgical History  Past Surgical History:   Procedure Laterality Date    BUNIONECTOMY      Left foot     COLON SURGERY      COLONOSCOPY  2018    DILATION AND CURETTAGE OF UTERUS      INDUCED       surgically induced    VT ANASTOMOSIS,AV,ANY SITE Left 2019    Procedure: CREATION FISTULA ARTERIOVENOUS (AV) left wrists possible left upper;  Surgeon: Pili Croft MD;  [facility];  Service: Vascular    VT CORRJ HALLUX VALGUS W/[facility] W/DIST Kathraina Sites Left 2019    Procedure: Feliberto Zamorano;  Surgeon: Kenna Willard DPM;  [facility];  Service: Podiatry    VT Omid W/[facility] W/DIST Katharina Sites Right 8/3/2020    Procedure: Gladys Kwan;  Surgeon: Kenna Willard DPM;  [facility]; Service: Podiatry    TN ERCP DX COLLECTION SPECIMEN BRUSHING/WASHING N/A 4/11/2018    Procedure: ENDOSCOPIC RETROGRADE CHOLANGIOPANCREATOGRAPHY (ERCP); Surgeon: Anju Son MD;  [facility];  Service: Gastroenterology    TN LAP, SURG PROCTOPEXY N/A 7/13/2018    Procedure: ROBOTIC SIGMOID RESECTION / RECTOPEXY;  Surgeon: Ursula Joel MD;  [facility];  Service: Colorectal    TN SIGMOIDOSCOPY FLX DX W/COLLJ SPEC BR/WA IF PFRMD N/A 7/13/2018    Procedure: SIGMOIDOSCOPY FLEXIBLE;  Surgeon: Ursula Joel MD;  [facility];  Service: Colorectal    TUBAL LIGATION Bilateral 1997    US GUIDED THYROID BIOPSY  7/30/2019        Allergies:  Pollen extract    Medications:  Current Outpatient Medications   Medication Sig Dispense Refill    acetaminophen (TYLENOL) 325 mg tablet Take 650 mg by mouth every 6 (six) hours as needed for mild pain      albuterol (Ventolin HFA) 90 mcg/act inhaler Inhale 2 puffs every 6 (six) hours as needed for wheezing or shortness of breath 8 5 g 3    AMILoride 5 mg tablet Take 1 tablet by mouth twice daily 120 tablet 0    aspirin (ECOTRIN LOW STRENGTH) 81 mg EC tablet Take 1 tablet (81 mg total) by mouth daily 30 tablet 0    atorvastatin (LIPITOR) 40 mg tablet Take 1 tablet (40 mg total) by mouth daily 30 tablet 11    budesonide-formoterol (SYMBICORT) 160-4 5 mcg/act inhaler Inhale 2 puffs 2 (two) times a day Rinse mouth after use   30 6 g 3    carvedilol (COREG) 3 125 mg tablet Take 1 tablet (3 125 mg total) by mouth 2 (two) times a day with meals 360 tablet 3    clonazePAM (KlonoPIN) 0 5 mg tablet Take 1 tablet (0 5 mg total) by mouth 3 (three) times a day 270 tablet 0    fluocinonide (LIDEX) 0 05 % ointment Apply topically 2 (two) times a day 60 g 1    fluvoxaMINE (LUVOX) 100 mg tablet 3 at bedtime      HYDROcodone-acetaminophen (NORCO) 5-325 mg per tablet Take 1 tablet by mouth every 6 (six) hours as needed for painMax Daily Amount: 4 tablets 20 tablet 0    lamoTRIgine (LaMICtal) 200 MG tablet Take 400 mg by mouth daily at bedtime       naloxone (NARCAN) 4 mg/0 1 mL nasal spray Administer 1 spray into a nostril  If breathing does not return to normal or if breathing difficulty resumes after 2-3 minutes, give another dose in the other nostril using a new spray  1 each 1    omeprazole (PriLOSEC) 20 mg delayed release capsule Take 1 capsule (20 mg total) by mouth daily at bedtime 90 capsule 3    ondansetron (ZOFRAN-ODT) 4 mg disintegrating tablet DISSOLVE 1 TABLET IN MOUTH EVERY 8 HOURS AS NEEDED FOR NAUSEA AND VOMITING 180 tablet 0    Polyethylene Glycol 3350 (MIRALAX PO) Take by mouth as needed       QUEtiapine (SEROquel) 100 mg tablet Take 1 tablet by mouth daily at bedtime 1 at bedtime in addition to 400 mg tab      QUEtiapine (SEROQUEL) 300 mg tablet Take 1 tablet (300 mg total) by mouth every morning 1 in the AM     ( also takes 400 mg at bedtime) 90 tablet 3    QUEtiapine (SEROquel) 400 MG tablet 1 at bedtime     (also takes 100 mg and 300 mg tabs)      traMADol (ULTRAM) 50 mg tablet Take 2 tabs by mouth twice daily 120 tablet 0    linaCLOtide (Linzess) 290 MCG CAPS Take 1 capsule by mouth daily 90 capsule 3     No current facility-administered medications for this visit          Social History:  Social History     Socioeconomic History    Marital status:      Spouse name: None    Number of children: None    Years of education: None    Highest education level: None   Occupational History    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   Social Needs    Financial resource strain: None    Food insecurity     Worry: None     Inability: None    Transportation needs     Medical: No     Non-medical: No   Tobacco Use    Smoking status: Never Smoker    Smokeless tobacco: Never Used   Substance and Sexual Activity    Alcohol use: Never     Frequency: Never     Binge frequency: Never    Drug use: Not Currently     Types: Marijuana    Sexual activity: Not Currently   Lifestyle    Physical activity     Days per week: 0 days     Minutes per session: 0 min    Stress: To some extent   Relationships    Social connections     Talks on phone: None     Gets together: None     Attends [employer] service: None     Active member of club or organization: None     Attends meetings of clubs or organizations: None     Relationship status: None    Intimate partner violence     Fear of current or ex partner: No     Emotionally abused: No     Physically abused: No     Forced sexual activity: No   Other Topics Concern    None   Social History Narrative    Daily caffeine consumption 2-3 servings a day    Lives with family  No living will  Has dentures---no dental care  Primary language--English  Feels safe at home  Review of Systems   Constitutional: Negative for chills and fever  HENT: Negative for sore throat  Respiratory: Negative  Cardiovascular: Negative  Gastrointestinal: Negative  Neurological: Negative for weakness and numbness  Objective:      BP (!) 176/89   Pulse 84   Ht 5' 7" (1 702 m) Comment: verbal  Wt 99 3 kg (219 lb)   BMI 34 30 kg/m²          Physical Exam  Vitals signs reviewed  Constitutional:       General: She is not in acute distress  Appearance: She is not toxic-appearing or diaphoretic  Cardiovascular:      Rate and Rhythm: Normal rate and regular rhythm  Pulses:           Dorsalis pedis pulses are 2+ on the right side and 2+ on the left side  Posterior tibial pulses are 1+ on the right side and 1+ on the left side  Comments: No cyanosis  CRT WNL all toes  Pulmonary:      Effort: Pulmonary effort is normal  No respiratory distress  Musculoskeletal: Normal range of motion  General: Swelling, tenderness and deformity present  Right lower leg: No edema  Left lower leg: No edema  Right foot: No foot drop  Left foot: No foot drop  Comments: Mild edema and pain on left 5th toe  Lateral angulation of bilateral great toe  Skin:     General: Skin is warm and dry  Capillary Refill: Capillary refill takes less than 2 seconds  Coloration: Skin is not cyanotic or mottled  Findings: No abscess, petechiae, rash or wound  Nails: There is no clubbing  Neurological:      General: No focal deficit present  Mental Status: She is alert and oriented to person, place, and time  Cranial Nerves: No cranial nerve deficit  Sensory: No sensory deficit  Motor: No weakness  Coordination: Coordination normal    Psychiatric:         Mood and Affect: Mood normal          Behavior: Behavior normal          Thought Content:  Thought content normal          Judgment: Judgment normal

## 2021-05-26 ENCOUNTER — LAB (OUTPATIENT)
Dept: LAB | Facility: HOSPITAL | Age: 61
End: 2021-05-26
Attending: INTERNAL MEDICINE
Payer: MEDICARE

## 2021-05-26 DIAGNOSIS — S82.831D CLOSED FRACTURE OF DISTAL END OF RIGHT FIBULA WITH ROUTINE HEALING, UNSPECIFIED FRACTURE MORPHOLOGY, SUBSEQUENT ENCOUNTER: ICD-10-CM

## 2021-05-26 DIAGNOSIS — E04.1 THYROID NODULE: ICD-10-CM

## 2021-05-26 DIAGNOSIS — J45.40 MODERATE PERSISTENT ASTHMA WITHOUT COMPLICATION: ICD-10-CM

## 2021-05-26 DIAGNOSIS — M81.6 LOCALIZED OSTEOPOROSIS (LEQUESNE): ICD-10-CM

## 2021-05-26 DIAGNOSIS — F31.81 BIPOLAR 2 DISORDER (HCC): ICD-10-CM

## 2021-05-26 DIAGNOSIS — N18.4 CKD (CHRONIC KIDNEY DISEASE) STAGE 4, GFR 15-29 ML/MIN (HCC): ICD-10-CM

## 2021-05-26 DIAGNOSIS — I10 ESSENTIAL HYPERTENSION: ICD-10-CM

## 2021-05-26 DIAGNOSIS — R79.89 ELEVATED LFTS: ICD-10-CM

## 2021-05-26 DIAGNOSIS — E21.3 HYPERPARATHYROIDISM (HCC): ICD-10-CM

## 2021-05-26 DIAGNOSIS — I77.0 AVF (ARTERIOVENOUS FISTULA) (HCC): ICD-10-CM

## 2021-05-26 DIAGNOSIS — N25.81 SECONDARY RENAL HYPERPARATHYROIDISM (HCC): ICD-10-CM

## 2021-05-26 DIAGNOSIS — E78.00 HYPERCHOLESTEREMIA: Chronic | ICD-10-CM

## 2021-05-26 LAB
25(OH)D3 SERPL-MCNC: 35 NG/ML (ref 30–100)
ALBUMIN SERPL BCP-MCNC: 3.5 G/DL (ref 3.5–5)
ANION GAP SERPL CALCULATED.3IONS-SCNC: 4 MMOL/L (ref 4–13)
BACTERIA UR QL AUTO: ABNORMAL /HPF
BASOPHILS # BLD AUTO: 0.08 THOUSANDS/ΜL (ref 0–0.1)
BASOPHILS NFR BLD AUTO: 1 % (ref 0–1)
BILIRUB UR QL STRIP: NEGATIVE
BUN SERPL-MCNC: 40 MG/DL (ref 5–25)
CALCIUM SERPL-MCNC: 9.6 MG/DL (ref 8.3–10.1)
CHLORIDE SERPL-SCNC: 113 MMOL/L (ref 100–108)
CHOLEST SERPL-MCNC: 275 MG/DL (ref 50–200)
CLARITY UR: CLEAR
CO2 SERPL-SCNC: 26 MMOL/L (ref 21–32)
COLOR UR: YELLOW
CREAT SERPL-MCNC: 2 MG/DL (ref 0.6–1.3)
CREAT UR-MCNC: 22.7 MG/DL
EOSINOPHIL # BLD AUTO: 0.34 THOUSAND/ΜL (ref 0–0.61)
EOSINOPHIL NFR BLD AUTO: 6 % (ref 0–6)
ERYTHROCYTE [DISTWIDTH] IN BLOOD BY AUTOMATED COUNT: 12.7 % (ref 11.6–15.1)
FERRITIN SERPL-MCNC: 38 NG/ML (ref 8–388)
GFR SERPL CREATININE-BSD FRML MDRD: 27 ML/MIN/1.73SQ M
GLUCOSE P FAST SERPL-MCNC: 108 MG/DL (ref 65–99)
GLUCOSE UR STRIP-MCNC: NEGATIVE MG/DL
HCT VFR BLD AUTO: 38.8 % (ref 34.8–46.1)
HDLC SERPL-MCNC: 49 MG/DL
HGB BLD-MCNC: 11.9 G/DL (ref 11.5–15.4)
HGB UR QL STRIP.AUTO: NEGATIVE
HYALINE CASTS #/AREA URNS LPF: ABNORMAL /LPF
IMM GRANULOCYTES # BLD AUTO: 0.01 THOUSAND/UL (ref 0–0.2)
IMM GRANULOCYTES NFR BLD AUTO: 0 % (ref 0–2)
IRON SATN MFR SERPL: 16 %
IRON SERPL-MCNC: 51 UG/DL (ref 50–170)
KETONES UR STRIP-MCNC: NEGATIVE MG/DL
LDLC SERPL CALC-MCNC: 164 MG/DL (ref 0–100)
LEUKOCYTE ESTERASE UR QL STRIP: ABNORMAL
LYMPHOCYTES # BLD AUTO: 1.56 THOUSANDS/ΜL (ref 0.6–4.47)
LYMPHOCYTES NFR BLD AUTO: 28 % (ref 14–44)
MAGNESIUM SERPL-MCNC: 2.3 MG/DL (ref 1.6–2.6)
MCH RBC QN AUTO: 32.2 PG (ref 26.8–34.3)
MCHC RBC AUTO-ENTMCNC: 30.7 G/DL (ref 31.4–37.4)
MCV RBC AUTO: 105 FL (ref 82–98)
MONOCYTES # BLD AUTO: 0.53 THOUSAND/ΜL (ref 0.17–1.22)
MONOCYTES NFR BLD AUTO: 10 % (ref 4–12)
NEUTROPHILS # BLD AUTO: 3.06 THOUSANDS/ΜL (ref 1.85–7.62)
NEUTS SEG NFR BLD AUTO: 55 % (ref 43–75)
NITRITE UR QL STRIP: NEGATIVE
NON-SQ EPI CELLS URNS QL MICRO: ABNORMAL /HPF
NONHDLC SERPL-MCNC: 226 MG/DL
NRBC BLD AUTO-RTO: 0 /100 WBCS
PH UR STRIP.AUTO: 6.5 [PH]
PHOSPHATE SERPL-MCNC: 4.6 MG/DL (ref 2.7–4.5)
PLATELET # BLD AUTO: 271 THOUSANDS/UL (ref 149–390)
PMV BLD AUTO: 10.6 FL (ref 8.9–12.7)
POTASSIUM SERPL-SCNC: 4.8 MMOL/L (ref 3.5–5.3)
PROT UR STRIP-MCNC: NEGATIVE MG/DL
PROT UR-MCNC: 10 MG/DL
PROT/CREAT UR: 0.44 MG/G{CREAT} (ref 0–0.1)
RBC # BLD AUTO: 3.69 MILLION/UL (ref 3.81–5.12)
RBC #/AREA URNS AUTO: ABNORMAL /HPF
SODIUM SERPL-SCNC: 143 MMOL/L (ref 136–145)
SP GR UR STRIP.AUTO: 1.01 (ref 1–1.03)
TIBC SERPL-MCNC: 322 UG/DL (ref 250–450)
TRIGL SERPL-MCNC: 308 MG/DL
TSH SERPL DL<=0.05 MIU/L-ACNC: 2.89 UIU/ML (ref 0.36–3.74)
UROBILINOGEN UR QL STRIP.AUTO: 0.2 E.U./DL
WBC # BLD AUTO: 5.58 THOUSAND/UL (ref 4.31–10.16)
WBC #/AREA URNS AUTO: ABNORMAL /HPF

## 2021-05-26 PROCEDURE — 84156 ASSAY OF PROTEIN URINE: CPT | Performed by: INTERNAL MEDICINE

## 2021-05-26 PROCEDURE — 36415 COLL VENOUS BLD VENIPUNCTURE: CPT

## 2021-05-26 PROCEDURE — 82728 ASSAY OF FERRITIN: CPT

## 2021-05-26 PROCEDURE — 80061 LIPID PANEL: CPT

## 2021-05-26 PROCEDURE — 85025 COMPLETE CBC W/AUTO DIFF WBC: CPT

## 2021-05-26 PROCEDURE — 83735 ASSAY OF MAGNESIUM: CPT

## 2021-05-26 PROCEDURE — 81001 URINALYSIS AUTO W/SCOPE: CPT | Performed by: INTERNAL MEDICINE

## 2021-05-26 PROCEDURE — 80069 RENAL FUNCTION PANEL: CPT

## 2021-05-26 PROCEDURE — 82306 VITAMIN D 25 HYDROXY: CPT

## 2021-05-26 PROCEDURE — 84443 ASSAY THYROID STIM HORMONE: CPT

## 2021-05-26 PROCEDURE — 82570 ASSAY OF URINE CREATININE: CPT | Performed by: INTERNAL MEDICINE

## 2021-05-26 PROCEDURE — 83550 IRON BINDING TEST: CPT

## 2021-05-26 PROCEDURE — 83540 ASSAY OF IRON: CPT

## 2021-05-27 ENCOUNTER — OFFICE VISIT (OUTPATIENT)
Dept: OBGYN CLINIC | Facility: CLINIC | Age: 61
End: 2021-05-27
Payer: MEDICARE

## 2021-05-27 VITALS — DIASTOLIC BLOOD PRESSURE: 82 MMHG | TEMPERATURE: 99 F | SYSTOLIC BLOOD PRESSURE: 140 MMHG

## 2021-05-27 DIAGNOSIS — M17.11 PRIMARY OSTEOARTHRITIS OF RIGHT KNEE: Primary | ICD-10-CM

## 2021-05-27 DIAGNOSIS — M17.12 PRIMARY OSTEOARTHRITIS OF LEFT KNEE: ICD-10-CM

## 2021-05-27 PROCEDURE — 99213 OFFICE O/P EST LOW 20 MIN: CPT | Performed by: PHYSICIAN ASSISTANT

## 2021-05-27 PROCEDURE — 20610 DRAIN/INJ JOINT/BURSA W/O US: CPT | Performed by: PHYSICIAN ASSISTANT

## 2021-05-27 NOTE — PROGRESS NOTES
Assessment:     1  Primary osteoarthritis of right knee    2  Primary osteoarthritis of left knee        Plan:     Problem List Items Addressed This Visit        Musculoskeletal and Integument    Primary osteoarthritis of right knee - Primary    Relevant Medications    sodium hyaluronate (ORTHOVISC) injection SOSY 30 mg (Completed)    Other Relevant Orders    Large joint arthrocentesis: R knee (Completed)    Primary osteoarthritis of left knee    Relevant Medications    sodium hyaluronate (ORTHOVISC) injection SOSY 30 mg (Completed)    Other Relevant Orders    Large joint arthrocentesis: L knee (Completed)            Findings consistent with bilateral knee osteoarthritis  Findings and treatment options were discussed with the patient  The 3rd of 3 bilateral knee Orthovisc injections was given today  She tolerated the procedures well  Advised to apply cold compress today  Follow up in 3 months for reevaluation  She will follow up in 2 weeks for reevaluation of left small toe fracture  All questions were answered to patient's satisfaction  Subjective:     Patient ID: Randall Vega is a 64 y o  female  Chief Complaint: This is a 29-year-old white female following up for bilateral knee osteoarthritis  She is here for the 3rd of 3 bilateral knee Orthovisc injections  No issues after the 2nd injections  No issues after 2nd injections      Allergy:  Allergies   Allergen Reactions    Pollen Extract Nasal Congestion     Medications:  all current active meds have been reviewed  Past Medical History:  Past Medical History:   Diagnosis Date    Anxiety     Anxiety disorder     [facility]     Chronic back pain     Closed fracture of distal end of right fibula with routine healing 11/4/2020    COVID-19     in Jan 2021    [facility]A [facility])     noted on MRI in the past    Depression     GERD (gastroesophageal reflux disease)     Hypercholesteremia     Hypernatremia  Hypertension     Hypokalemia     Idiopathic chronic [facility]itis ([facility] ) 3/20/2018    Intervertebral disc disorder with radiculopathy of lumbosacral region     resolved: 2015    Kidney disease     Panic attacks     Pericardial effusion     Radiculitis     resolved: 2015    Secondary renal hyperparathyroidism ([facility] )     Vitamin D deficiency      Past Surgical History:  Past Surgical History:   Procedure Laterality Date    BUNIONECTOMY      Left foot     COLON SURGERY      COLONOSCOPY  2021    DILATION AND CURETTAGE OF UTERUS      INDUCED       surgically induced    FL ANASTOMOSIS,AV,ANY SITE Left 2019    Procedure: CREATION FISTULA ARTERIOVENOUS (AV) left wrists possible left upper;  Surgeon: Vilma Bueno MD;  [facility];  Service: Vascular    [place] [facility] [place] [facility]    Procedure: Alejandra Prader;  Surgeon: Janay Berry DPM;  [facility];  Service: [address] [place] [address] W/[facility] W/DIST [facility] Right 8/3/2020    Procedure: Roise Clipper;  Surgeon: Janay Berry DPM;  [facility];  Service: Podiatry    FL ERCP DX COLLECTION SPECIMEN BRUSHING/WASHING N/A 2018    Procedure: ENDOSCOPIC RETROGRADE CHOLANGIOPANCREATOGRAPHY (ERCP);   Surgeon: Anju Son MD;  [facility];  Service: Gastroenterology    FL LAP, SURG PROCTOPEXY N/A 2018    Procedure: ROBOTIC SIGMOID RESECTION / RECTOPEXY;  Surgeon: Ursula Joel MD;  [facility];  Service: Colorectal    FL SIGMOIDOSCOPY FLX DX W/COLLJ SPEC BR/WA IF PFRMD N/A 2018    Procedure: Inez Adams;  Surgeon: Ursula Joel MD;  [facility];  Service: Colorectal    [facility]     Family History:  Family History   Problem Relation Age of Onset    Bipolar disorder Mother     Mental illness Mother         depression  Stroke Mother     Dementia Mother     Colon polyps Mother     Heart disease Father     Hypertension Father     Diabetes Father     Other Family         Back disorder    Diabetes Family     Heart disease Family     Hypertension Family     Breast cancer Family     Stroke Family     Thyroid disease Family     Breast cancer Paternal Grandmother     Breast cancer Paternal [de-identified]     Breast cancer Maternal Aunt     Mental illness Sister     Colon polyps Sister     Mental illness Sister     Heart disease Sister     Breast cancer Sister     No Known Problems Sister     Other Son         pituitary tumor    Hypertension Son     Obesity Son     No Known Problems Son     Substance Abuse Neg Hx         neg fam hx    Colon cancer Neg Hx      Social History:  Social History     Substance and Sexual Activity   Alcohol Use Never    Frequency: Never    Binge frequency: Never     Social History     Substance and Sexual Activity   Drug Use Not Currently    Types: Marijuana     Social History     Tobacco Use   Smoking Status Never Smoker   Smokeless Tobacco Never Used     Review of Systems   Constitutional: Negative  HENT: Negative  Eyes: Negative  Respiratory: Negative  Cardiovascular: Negative  Gastrointestinal: Negative  Endocrine: Negative  Genitourinary: Negative  Musculoskeletal: Positive for arthralgias and gait problem (post op shoe LLE)  Skin: Negative  Allergic/Immunologic: Negative  Hematological: Negative  Psychiatric/Behavioral: Negative  Objective:  BP Readings from Last 1 Encounters:   05/27/21 140/82      Wt Readings from Last 1 Encounters:   05/24/21 99 3 kg (219 lb)      BMI:   Estimated body mass index is 34 3 kg/m² as calculated from the following:    Height as of 5/24/21: 5' 7" (1 702 m)  Weight as of 5/24/21: 99 3 kg (219 lb)    BSA:   Estimated body surface area is 2 1 meters squared as calculated from the following:    Height as of 5/24/21: 5' 7" (1 702 m)  Weight as of 5/24/21: 99 3 kg (219 lb)  Physical Exam  Constitutional:       General: She is not in acute distress  Appearance: She is well-developed  HENT:      Head: Normocephalic  Eyes:      Conjunctiva/sclera: Conjunctivae normal       Pupils: Pupils are equal, round, and reactive to light  Pulmonary:      Effort: Pulmonary effort is normal  No respiratory distress  Musculoskeletal:         General: Tenderness present  Right knee: She exhibits no effusion  Left knee: She exhibits no effusion  Skin:     General: Skin is warm and dry  Neurological:      Mental Status: She is alert and oriented to person, place, and time  Psychiatric:         Behavior: Behavior normal        Right Knee Exam     Muscle Strength   The patient has normal right knee strength  Tenderness   The patient is experiencing no tenderness  Range of Motion   The patient has normal right knee ROM  Tests   Kateryna:  Medial - negative Lateral - negative  Varus: negative Valgus: negative    Other   Erythema: absent  Sensation: normal  Pulse: present  Swelling: none  Effusion: no effusion present    Comments:  Patellofemoral crepitation      Left Knee Exam     Muscle Strength   The patient has normal left knee strength  Tenderness   The patient is experiencing tenderness in the medial joint line and lateral joint line  Range of Motion   The patient has normal left knee ROM  Tests   Kateryna:  Medial - negative Lateral - negative  Varus: negative Valgus: negative    Other   Erythema: absent  Sensation: normal  Pulse: present  Swelling: none  Effusion: no effusion present    Comments:   Patellofemoral crepitation            No new imaging today  Large joint arthrocentesis: R knee  Universal Protocol:  Consent: Verbal consent obtained    Risks and benefits: risks, benefits and alternatives were discussed  Consent given by: patient  Patient understanding: patient states understanding of the procedure being performed    Supporting Documentation  Indications: pain   Procedure Details  Location: knee - R knee  Preparation: Patient was prepped and draped in the usual sterile fashion  Needle size: 22 G  Approach: anterolateral  Medications administered: 30 mg sodium hyaluronate 30 mg/2 mL    Patient tolerance: patient tolerated the procedure well with no immediate complications  Dressing:  Sterile dressing applied    Large joint arthrocentesis: L knee  Universal Protocol:  Consent: Verbal consent obtained    Risks and benefits: risks, benefits and alternatives were discussed  Consent given by: patient  Patient understanding: patient states understanding of the procedure being performed    Supporting Documentation  Indications: pain   Procedure Details  Location: knee - L knee  Preparation: Patient was prepped and draped in the usual sterile fashion  Needle size: 22 G  Approach: anterolateral  Medications administered: 30 mg sodium hyaluronate 30 mg/2 mL    Patient tolerance: patient tolerated the procedure well with no immediate complications  Dressing:  Sterile dressing applied

## 2021-05-28 DIAGNOSIS — I10 HTN (HYPERTENSION): ICD-10-CM

## 2021-05-29 ENCOUNTER — IMMUNIZATIONS (OUTPATIENT)
Dept: FAMILY MEDICINE CLINIC | Facility: HOSPITAL | Age: 61
End: 2021-05-29

## 2021-05-29 DIAGNOSIS — Z23 ENCOUNTER FOR IMMUNIZATION: Primary | ICD-10-CM

## 2021-05-29 PROCEDURE — 0002A SARS-COV-2 / COVID-19 MRNA VACCINE (PFIZER-BIONTECH) 30 MCG: CPT

## 2021-05-29 PROCEDURE — 91300 SARS-COV-2 / COVID-19 MRNA VACCINE (PFIZER-BIONTECH) 30 MCG: CPT

## 2021-06-01 RX ORDER — AMILORIDE HYDROCHLORIDE 5 MG/1
TABLET ORAL
Qty: 120 TABLET | Refills: 0 | Status: SHIPPED | OUTPATIENT
Start: 2021-06-01 | End: 2021-07-29 | Stop reason: SDUPTHER

## 2021-06-02 ENCOUNTER — TELEPHONE (OUTPATIENT)
Dept: FAMILY MEDICINE CLINIC | Facility: HOSPITAL | Age: 61
End: 2021-06-02

## 2021-06-02 DIAGNOSIS — R79.89 ELEVATED LFTS: ICD-10-CM

## 2021-06-02 DIAGNOSIS — S82.64XD CLOSED NONDISPLACED FRACTURE OF LATERAL MALLEOLUS OF RIGHT FIBULA WITH ROUTINE HEALING, SUBSEQUENT ENCOUNTER: ICD-10-CM

## 2021-06-02 RX ORDER — ATORVASTATIN CALCIUM 40 MG/1
40 TABLET, FILM COATED ORAL DAILY
Qty: 30 TABLET | Refills: 11 | Status: SHIPPED | OUTPATIENT
Start: 2021-06-02 | End: 2021-06-03 | Stop reason: SDUPTHER

## 2021-06-02 RX ORDER — HYDROCODONE BITARTRATE AND ACETAMINOPHEN 5; 325 MG/1; MG/1
1 TABLET ORAL EVERY 6 HOURS PRN
Qty: 20 TABLET | Refills: 0 | Status: SHIPPED | OUTPATIENT
Start: 2021-06-02 | End: 2021-06-16 | Stop reason: SDUPTHER

## 2021-06-03 ENCOUNTER — OFFICE VISIT (OUTPATIENT)
Dept: NEPHROLOGY | Facility: HOSPITAL | Age: 61
End: 2021-06-03
Payer: MEDICARE

## 2021-06-03 ENCOUNTER — TELEPHONE (OUTPATIENT)
Dept: FAMILY MEDICINE CLINIC | Facility: HOSPITAL | Age: 61
End: 2021-06-03

## 2021-06-03 VITALS
HEART RATE: 72 BPM | DIASTOLIC BLOOD PRESSURE: 68 MMHG | SYSTOLIC BLOOD PRESSURE: 120 MMHG | WEIGHT: 215 LBS | RESPIRATION RATE: 16 BRPM | HEIGHT: 67 IN | BODY MASS INDEX: 33.74 KG/M2

## 2021-06-03 DIAGNOSIS — E23.2 DIABETES INSIPIDUS (HCC): ICD-10-CM

## 2021-06-03 DIAGNOSIS — N28.1 RENAL CYST: ICD-10-CM

## 2021-06-03 DIAGNOSIS — N18.30 CHRONIC KIDNEY DISEASE, STAGE 3 (HCC): ICD-10-CM

## 2021-06-03 DIAGNOSIS — R79.89 ELEVATED LFTS: ICD-10-CM

## 2021-06-03 DIAGNOSIS — N25.81 SECONDARY RENAL HYPERPARATHYROIDISM (HCC): ICD-10-CM

## 2021-06-03 DIAGNOSIS — K21.9 GASTROESOPHAGEAL REFLUX DISEASE: ICD-10-CM

## 2021-06-03 DIAGNOSIS — I10 ESSENTIAL HYPERTENSION: ICD-10-CM

## 2021-06-03 PROCEDURE — 99214 OFFICE O/P EST MOD 30 MIN: CPT | Performed by: INTERNAL MEDICINE

## 2021-06-03 RX ORDER — ATORVASTATIN CALCIUM 40 MG/1
40 TABLET, FILM COATED ORAL DAILY
Qty: 90 TABLET | Refills: 3 | Status: SHIPPED | OUTPATIENT
Start: 2021-06-03 | End: 2021-07-21 | Stop reason: SDUPTHER

## 2021-06-03 RX ORDER — OMEPRAZOLE 20 MG/1
20 CAPSULE, DELAYED RELEASE ORAL
Qty: 90 CAPSULE | Refills: 3 | Status: SHIPPED | OUTPATIENT
Start: 2021-06-03 | End: 2021-09-09 | Stop reason: SDUPTHER

## 2021-06-03 NOTE — TELEPHONE ENCOUNTER
Pt states she wants the atorvastatin (LIPITOR) 40 mg tablet ordered yesterday to go to [facility] instead of [facility] for 90 day supply    Also needs  omeprazole (PriLOSEC) 20 mg delayed release capsule sent to [facility]

## 2021-06-03 NOTE — PATIENT INSTRUCTIONS
Chronic Kidney Disease   WHAT YOU NEED TO KNOW:   Chronic kidney disease (CKD) is the gradual and permanent loss of kidney function  It is also called chronic kidney failure, or chronic renal insufficiency  Normally, the kidneys remove fluid, chemicals, and waste from your blood  These wastes are turned into urine by your kidneys  CKD may worsen over time and lead to kidney failure  Your CKD team will help you and your family plan for your care at home  The team will help you create goals and find ways to meet your goals  Your care plan may change over time as your needs change  DISCHARGE INSTRUCTIONS:   Call your local emergency number (911 in the [address]) if:   · You have a seizure  · You have shortness of breath  Call your doctor or nephrologist if:   · You are confused and very drowsy  · You suddenly gain or lose more weight than your healthcare provider has told you is okay  · You have itchy skin or a rash  · You urinate more or less than you normally do  · You have blood in your urine  · You have nausea and are vomiting  · You have fatigue or muscle weakness  · You have hiccups that will not stop  · You have questions or concerns about your condition or care  Medicines:   · Medicines  may be given to decrease blood pressure and get rid of extra fluid  You may also receive medicine to manage health conditions that may occur with CKD, such as anemia, diabetes, and heart disease  · Take your medicine as directed  Contact your healthcare provider if you think your medicine is not helping or if you have side effects  Tell him or her if you are allergic to any medicine  Keep a list of the medicines, vitamins, and herbs you take  Include the amounts, and when and why you take them  Bring the list or the pill bottles to follow-up visits  Carry your medicine list with you in case of an emergency  What you can do to manage CKD: Management may include making some lifestyle changes  Tell your healthcare provider if you have any concerns about being able to make the changes  He or she can help you find solutions, including working with specialists  Ask for help creating a plan to break large goals into smaller steps  Your plan may include any of the following:  · Manage other health conditions  Your healthcare provider will work with you to make a care plan that meets your needs  You will be checked regularly for heart disease or other conditions that can make CKD worse, such as diabetes  Your blood pressure will be closely monitored  You will also get a target blood pressure and help making a plan to reach your target  This may include taking your blood pressure at home  · Maintain a healthy weight  Extra weight can strain your kidneys  Ask what a healthy weight is for you  Your provider can help you create a weight loss plan if you are overweight  · Create an exercise plan  Regular exercise can help you manage CKD, high blood pressure, and diabetes  Exercise also helps control weight  Your provider can help you create exercise goals and a plan to reach those goals  For example, your goal may be to exercise for 30 minutes in a day  Your plan can include breaking exercise into 10 minute sessions, 3 times during the day  · Create a healthy eating plan  Your provider may tell you to eat food low in sodium (salt), potassium, phosphorus, or protein  A dietitian can help you plan meals if needed  Ask how much liquid to drink each day and which liquids are best for you  · Limit alcohol as directed  Alcohol can cause fluid retention and can affect your kidneys  Ask how much alcohol is safe for you  A drink of alcohol is 12 ounces of beer, 5 ounces of wine, or 1½ ounces of liquor  · Do not smoke  Nicotine and other chemicals in cigarettes and cigars can cause kidney damage  Ask your provider for information if you currently smoke and need help to quit   E-cigarettes or smokeless tobacco still contain nicotine  Talk to your provider before you use these products  · Ask about over-the-counter medicines  Medicines such as NSAIDs and laxatives may harm your kidneys  Some cough and cold medicines can raise your blood pressure  Always ask if a medicine is safe before you take it  · Ask about vaccines you may need  Infections such as pneumonia, influenza, and hepatitis can be more harmful or more likely to occur in a person who has CKD  Vaccines lower your risk for infection  Follow up with your doctor as directed: You will need to return for tests to monitor your kidney and nerve function, and your parathyroid hormone level  Your medicines may be changed, based on certain test results  You may also be referred to a nephrologist (kidney specialist)  Write down your questions so you remember to ask them during your visits  © Copyright [address] Information is for End User's use only and may not be sold, redistributed or otherwise used for commercial purposes  All illustrations and images included in CareNotes® are the copyrighted property of A D A M , Inc  or [facility] Carmen Poole   The above information is an  only  It is not intended as medical advice for individual conditions or treatments  Talk to your doctor, nurse or pharmacist before following any medical regimen to see if it is safe and effective for you

## 2021-06-03 NOTE — PROGRESS NOTES
OFFICE FOLLOW UP - Nephrology   Eric Silvestre 64 y o  female MRN: [id number]    Encounter: [id number]        ASSESSMENT and PLAN:  Diagnoses and all orders for this visit:    62-year-old female with a past medical history of bipolar disorder, stage 3 chronic kidney disease who presents for follow-up    Chronic kidney disease, stage IV  - creatinine fluctuates and is anywhere between 1 9 in 2 4, estimated GFR between 22 and 28 mL/minute  - she is currently not on any nephrotoxic agent,  Was on lithium for several years  - renal cyst is stable in size October of 2019  -she is off her potassium supplementation  -blood pressures have been well controlled  -urine protein to creatinine ratios have been minimal  -she has had echogenic kidneys that have been small in size since at least 2016  -completed CKD education  - status post AV fistula creation left brachiocephalic  -her creatinine remained stable    Bipolar 2 disorder ([address] ), eating disorder  - she was on lithium in her 25s and may have some chronic interstitial nephritis associated with the she also has some mild hypernatremia  - she is currently being treated with Seroquel    Cyst of right kidney  - she has had recent MRIs and CT scan that have been done serially most recently had a CT scan in February of 2020 this is grossly stable partially exophytic nodule left lower pole    Secondary renal hyperparathyroidism ([address] )  - she has a PTH around 125, we have discontinued her vitamin-D and calcium acid a for a rising calcium    Proteinuria  - she has a positive urine protein to creatinine ratio but a negative urinalysis she is  mildly anemic with CKD    She was on lithium several years ago she also has had acute kidney injury from chronic lactulose  Use  -  limited serologic workup was negative last urine protein to creatinine ratio was negligible    hypertension  -she is now on carvedilol 6 25 mg twice daily,  and amiloride 5 mg twice daily-(in the setting of DI associated with chronic lithium use Amiloride was initiated)  -she is off diuretics and raas blockade, given her mental health issues she was taking large amounts of diuretics and laxatives so this point we will continue to hold these medications  -blood pressure is well controlled  -now off amlodipine blood pressure stable she was having side effects of increased edema  -blood pressure remains well controlled    Hypernatremia  -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 in the past her serum sodiums is elevated 140  -continue amiloride 5 mg twice daily repeat a BMP in 2 weeks to make sure sodium level is stable and will make further recommendation S have asked her to increase her free water intake    Elevated LFTs  -LFTs are stable  -continue statin    Stable from a renal stand will follow-up in 6 months    HPI: Jersey Gonzales is a 64 y o  female who is here for No chief complaint on file  Since her last office visit she has been doing relatively well, tolerating her medications well, her blood pressures have been well controlled, she denies any acute chest pain or shortness of breath no fevers chills no nausea vomiting diarrhea constipation no foamy or bloody urine renal function has remained stable her electrolytes are stable as well    ROS:   All the systems were reviewed and were negative except as documented on the HPI      Allergies: Pollen extract    Medications:   Current Outpatient Medications:     acetaminophen (TYLENOL) 325 mg tablet, Take 650 mg by mouth every 6 (six) hours as needed for mild pain, Disp: , Rfl:     albuterol (Ventolin HFA) 90 mcg/act inhaler, Inhale 2 puffs every 6 (six) hours as needed for wheezing or shortness of breath, Disp: 8 5 g, Rfl: 3    AMILoride 5 mg tablet, Take 1 tablet by mouth twice daily, Disp: 120 tablet, Rfl: 0    aspirin (ECOTRIN LOW STRENGTH) 81 mg EC tablet, Take 1 tablet (81 mg total) by mouth daily, Disp: 30 tablet, Rfl: 0    atorvastatin (LIPITOR) 40 mg tablet, Take 1 tablet (40 mg total) by mouth daily, Disp: 30 tablet, Rfl: 11    budesonide-formoterol (SYMBICORT) 160-4 5 mcg/act inhaler, Inhale 2 puffs 2 (two) times a day Rinse mouth after use , Disp: 30 6 g, Rfl: 3    carvedilol (COREG) 3 125 mg tablet, Take 1 tablet (3 125 mg total) by mouth 2 (two) times a day with meals (Patient taking differently: Take 6 25 mg by mouth 2 (two) times a day with meals ), Disp: 360 tablet, Rfl: 3    clonazePAM (KlonoPIN) 0 5 mg tablet, Take 1 tablet (0 5 mg total) by mouth 3 (three) times a day, Disp: 270 tablet, Rfl: 0    fluocinonide (LIDEX) 0 05 % ointment, Apply topically 2 (two) times a day, Disp: 60 g, Rfl: 1    fluvoxaMINE (LUVOX) 100 mg tablet, 3 at bedtime, Disp: , Rfl:     HYDROcodone-acetaminophen (NORCO) 5-325 mg per tablet, Take 1 tablet by mouth every 6 (six) hours as needed for painMax Daily Amount: 4 tablets, Disp: 20 tablet, Rfl: 0    lamoTRIgine (LaMICtal) 200 MG tablet, Take 400 mg by mouth daily at bedtime , Disp: , Rfl:     naloxone (NARCAN) 4 mg/0 1 mL nasal spray, Administer 1 spray into a nostril   If breathing does not return to normal or if breathing difficulty resumes after 2-3 minutes, give another dose in the other nostril using a new spray , Disp: 1 each, Rfl: 1    omeprazole (PriLOSEC) 20 mg delayed release capsule, Take 1 capsule (20 mg total) by mouth daily at bedtime, Disp: 90 capsule, Rfl: 3    ondansetron (ZOFRAN-ODT) 4 mg disintegrating tablet, DISSOLVE 1 TABLET IN MOUTH EVERY 8 HOURS AS NEEDED FOR NAUSEA AND VOMITING, Disp: 180 tablet, Rfl: 0    Polyethylene Glycol 3350 (MIRALAX PO), Take by mouth as needed , Disp: , Rfl:     QUEtiapine (SEROquel) 100 mg tablet, Take 1 tablet by mouth daily at bedtime 1 at bedtime in addition to 400 mg tab, Disp: , Rfl:     QUEtiapine (SEROQUEL) 300 mg tablet, Take 1 tablet (300 mg total) by mouth every morning 1 in the AM     ( also takes 400 mg at bedtime), Disp: 90 tablet, Rfl: 3    QUEtiapine (SEROquel) 400 MG tablet, 1 at bedtime     (also takes 100 mg and 300 mg tabs), Disp: , Rfl:     traMADol (ULTRAM) 50 mg tablet, Take 2 tabs by mouth twice daily, Disp: 120 tablet, Rfl: 0    linaCLOtide (Linzess) 290 MCG CAPS, Take 1 capsule by mouth daily, Disp: 90 capsule, Rfl: 3    Past Medical History:   Diagnosis Date    Anxiety     Anxiety disorder     Bipolar 2 disorder (HCC)     Chronic back pain     Closed fracture of distal end of right fibula with routine healing 2020    COVID-19     in 2021    CVA (cerebral vascular accident) ([address] )     noted on MRI in the past    Depression     GERD (gastroesophageal reflux disease)     Hypercholesteremia     Hypernatremia     Hypertension     Hypokalemia     Idiopathic chronic pancreatitis ([address] ) 3/20/2018    Intervertebral disc disorder with radiculopathy of lumbosacral region     resolved: 2015    Kidney disease     Panic attacks     Pericardial effusion     Radiculitis     resolved: 2015    Secondary renal hyperparathyroidism ([address] )     Vitamin D deficiency      Past Surgical History:   Procedure Laterality Date    BUNIONECTOMY      Left foot     COLON SURGERY      COLONOSCOPY  2021    DILATION AND CURETTAGE OF UTERUS      INDUCED       surgically induced    GA ANASTOMOSIS,AV,ANY SITE Left 2019    Procedure: CREATION FISTULA ARTERIOVENOUS (AV) left wrists possible left upper;  Surgeon: Con Clark MD;  [facility];  Service: Vascular    GA CORRJ HALLUX VALGUS W/SESMDC W/DIST METAR OSTEOT Left 2019    Procedure: Richardean Dubin;  Surgeon: Nadja Murphy DPM;  [facility];  Service: Podiatry    GA CORRJ [address] W/SESMDC W/DIST [place]  OSTEOT Right 8/3/2020    Procedure: Margie Walker;  Surgeon: Nadja Murphy DPM;  [facility];  Service: Podiatry    GA ERCP DX COLLECTION SPECIMEN BRUSHING/WASHING N/A 4/11/2018    Procedure: ENDOSCOPIC RETROGRADE CHOLANGIOPANCREATOGRAPHY (ERCP); Surgeon: Elliott Pablo MD;  [facility];  Service: Gastroenterology    KS LAP, SURG PROCTOPEXY N/A 7/13/2018    Procedure: ROBOTIC SIGMOID RESECTION / RECTOPEXY;  Surgeon: Henrique Maria MD;  [facility];  Service: Colorectal    KS SIGMOIDOSCOPY FLX DX W/COLLJ SPEC BR/WA IF PFRMD N/A 7/13/2018    Procedure: Destiney Perez;  Surgeon: Henrique Maria MD;  [facility];  Service: Colorectal    TUBAL LIGATION Bilateral [address] [facility] THYROID BIOPSY  7/30/2019     Family History   Problem Relation Age of Onset    Bipolar disorder Mother     Mental illness Mother         depression    Stroke Mother     Dementia Mother     Colon polyps Mother     Heart disease Father     Hypertension Father     Diabetes Father     Other Family         Back disorder    Diabetes Family     Heart disease Family     Hypertension Family     Breast cancer Family     Stroke Family     Thyroid disease Family     Breast cancer Paternal [de-identified]     Breast cancer Paternal [de-identified]     Breast cancer Maternal Aunt     Mental illness Sister     Colon polyps Sister     Mental illness Sister     Heart disease Sister     Breast cancer Sister     Breast cancer Sister     Other Son         pituitary tumor    Hypertension Son     Obesity Son     No Known Problems Son     Substance Abuse Neg Hx         neg fam hx    Colon cancer Neg Hx       reports that she has never smoked  She has never used smokeless tobacco  She reports previous drug use  Drug: Marijuana  She reports that she does not drink alcohol  Physical Exam:   Vitals:    06/03/21 0956   BP: 120/68   BP Location: Right arm   Patient Position: Sitting   Cuff Size: Large   Pulse: 72   Resp: 16   Weight: 97 5 kg (215 lb)   Height: 5' 7" (1 702 m)     Body mass index is 33 67 kg/m²      General: conscious, cooperative, in no acute distress  Eyes: conjunctivae pink, anicteric sclerae  ENT: lips and mucous membranes moist  Neck: supple, no JVD  Chest: clear breath sounds bilateral, no crackles, ronchus or wheezings  CVS: normal rate, regular rhythm  Abdomen: soft, non-tender, non-distended, normoactive bowel sounds  Extremities:  Boot on the right leg  Skin: no rash  Neuro: awake, alert, oriented  Psych:  Pleasant affect    Lab Results:    Results for orders placed or performed in visit on 05/26/21   Ferritin   Result Value Ref Range    Ferritin 38 8 - 388 ng/mL   Iron Saturation %   Result Value Ref Range    Iron Saturation 16 %    TIBC 322 250 - 450 ug/dL    Iron 51 50 - 170 ug/dL   Magnesium   Result Value Ref Range    Magnesium 2 3 1 6 - 2 6 mg/dL   Renal function panel   Result Value Ref Range    Albumin 3 5 3 5 - 5 0 g/dL    Calcium 9 6 8 3 - 10 1 mg/dL    Phosphorus 4 6 (H) 2 7 - 4 5 mg/dL    BUN 40 (H) 5 - 25 mg/dL    Creatinine 2 00 (H) 0 60 - 1 30 mg/dL    Sodium 143 136 - 145 mmol/L    Potassium 4 8 3 5 - 5 3 mmol/L    Chloride 113 (H) 100 - 108 mmol/L    CO2 26 21 - 32 mmol/L    ANION GAP 4 4 - 13 mmol/L    eGFR 27 ml/min/1 73sq m    Glucose, Fasting 108 (H) 65 - 99 mg/dL   CBC and differential   Result Value Ref Range    WBC 5 58 4 31 - 10 16 Thousand/uL    RBC 3 69 (L) 3 81 - 5 12 Million/uL    Hemoglobin 11 9 11 5 - 15 4 g/dL    Hematocrit 38 8 34 8 - 46 1 %     (H) 82 - 98 fL    MCH 32 2 26 8 - 34 3 pg    MCHC 30 7 (L) 31 4 - 37 4 g/dL    RDW 12 7 11 6 - 15 1 %    MPV 10 6 8 9 - 12 7 fL    Platelets 084 778 - 512 Thousands/uL    nRBC 0 /100 WBCs    Neutrophils Relative 55 43 - 75 %    Immat GRANS % 0 0 - 2 %    Lymphocytes Relative 28 14 - 44 %    Monocytes Relative 10 4 - 12 %    Eosinophils Relative 6 0 - 6 %    Basophils Relative 1 0 - 1 %    Neutrophils Absolute 3 06 1 85 - 7 62 Thousands/µL    Immature Grans Absolute 0 01 0 00 - 0 20 Thousand/uL    Lymphocytes Absolute 1 56 0 60 - 4 47 Thousands/µL    Monocytes Absolute 0 53 0 17 - 1 22 Thousand/µL    Eosinophils Absolute 0 34 0 00 - 0 61 Thousand/µL    Basophils Absolute 0 08 0 00 - 0 10 Thousands/µL   TSH, 3rd generation with Free T4 reflex   Result Value Ref Range    TSH 3RD GENERATON 2 890 0 358 - 3 740 uIU/mL   Vitamin D 25 hydroxy   Result Value Ref Range    Vit D, 25-Hydroxy 35 0 30 0 - 100 0 ng/mL   Lipid panel   Result Value Ref Range    Cholesterol 275 (H) 50 - 200 mg/dL    Triglycerides 308 (H) <=150 mg/dL    HDL, Direct 49 >=40 mg/dL    LDL Calculated 164 (H) 0 - 100 mg/dL    Non-HDL-Chol (CHOL-HDL) 226 mg/dl       Portions of the record may have been created with voice recognition software  Occasional wrong word or "sound a like" substitutions may have occurred due to the inherent limitations of voice recognition software  Read the chart carefully and recognize, using context, where substitutions have occurred  If you have any questions, please contact the dictating provider

## 2021-06-06 ENCOUNTER — HOSPITAL ENCOUNTER (EMERGENCY)
Facility: HOSPITAL | Age: 61
Discharge: HOME | End: 2021-06-06
Attending: EMERGENCY MEDICINE | Admitting: EMERGENCY MEDICINE
Payer: MEDICARE

## 2021-06-06 ENCOUNTER — APPOINTMENT (EMERGENCY)
Dept: ULTRASOUND IMAGING | Facility: HOSPITAL | Age: 61
End: 2021-06-06
Payer: MEDICARE

## 2021-06-06 VITALS
DIASTOLIC BLOOD PRESSURE: 80 MMHG | SYSTOLIC BLOOD PRESSURE: 154 MMHG | RESPIRATION RATE: 16 BRPM | BODY MASS INDEX: 33.74 KG/M2 | WEIGHT: 215 LBS | OXYGEN SATURATION: 98 % | TEMPERATURE: 98 F | HEART RATE: 76 BPM | HEIGHT: 67 IN

## 2021-06-06 DIAGNOSIS — R93.2 ABNORMAL ULTRASOUND OF BILIARY TRACT: ICD-10-CM

## 2021-06-06 DIAGNOSIS — R19.7 NAUSEA VOMITING AND DIARRHEA: Primary | ICD-10-CM

## 2021-06-06 DIAGNOSIS — R11.2 NAUSEA VOMITING AND DIARRHEA: Primary | ICD-10-CM

## 2021-06-06 DIAGNOSIS — R74.01 TRANSAMINITIS: ICD-10-CM

## 2021-06-06 LAB
ALBUMIN SERPL BCP-MCNC: 3.8 G/DL (ref 3.5–5)
ALP SERPL-CCNC: 209 U/L (ref 46–116)
ALT SERPL W P-5'-P-CCNC: 155 U/L (ref 12–78)
ANION GAP SERPL CALCULATED.3IONS-SCNC: 13 MMOL/L (ref 4–13)
AST SERPL W P-5'-P-CCNC: 86 U/L (ref 5–45)
BASOPHILS # BLD AUTO: 0.07 THOUSANDS/ΜL (ref 0–0.1)
BASOPHILS NFR BLD AUTO: 1 % (ref 0–1)
BILIRUB SERPL-MCNC: 0.4 MG/DL (ref 0.2–1)
BILIRUB UR QL STRIP: NEGATIVE
BUN SERPL-MCNC: 25 MG/DL (ref 5–25)
CALCIUM SERPL-MCNC: 9.2 MG/DL (ref 8.3–10.1)
CHLORIDE SERPL-SCNC: 107 MMOL/L (ref 100–108)
CLARITY UR: CLEAR
CO2 SERPL-SCNC: 24 MMOL/L (ref 21–32)
COLOR UR: YELLOW
CREAT SERPL-MCNC: 2.14 MG/DL (ref 0.6–1.3)
EOSINOPHIL # BLD AUTO: 0.08 THOUSAND/ΜL (ref 0–0.61)
EOSINOPHIL NFR BLD AUTO: 1 % (ref 0–6)
ERYTHROCYTE [DISTWIDTH] IN BLOOD BY AUTOMATED COUNT: 12.1 % (ref 11.6–15.1)
GFR SERPL CREATININE-BSD FRML MDRD: 25 ML/MIN/1.73SQ M
GLUCOSE SERPL-MCNC: 151 MG/DL (ref 65–140)
GLUCOSE UR STRIP-MCNC: NEGATIVE MG/DL
HCT VFR BLD AUTO: 41.9 % (ref 34.8–46.1)
HGB BLD-MCNC: 13.8 G/DL (ref 11.5–15.4)
HGB UR QL STRIP.AUTO: NEGATIVE
IMM GRANULOCYTES # BLD AUTO: 0.01 THOUSAND/UL (ref 0–0.2)
IMM GRANULOCYTES NFR BLD AUTO: 0 % (ref 0–2)
KETONES UR STRIP-MCNC: NEGATIVE MG/DL
LEUKOCYTE ESTERASE UR QL STRIP: NEGATIVE
LYMPHOCYTES # BLD AUTO: 1.27 THOUSANDS/ΜL (ref 0.6–4.47)
LYMPHOCYTES NFR BLD AUTO: 23 % (ref 14–44)
MAGNESIUM SERPL-MCNC: 2 MG/DL (ref 1.6–2.6)
MCH RBC QN AUTO: 32.5 PG (ref 26.8–34.3)
MCHC RBC AUTO-ENTMCNC: 32.9 G/DL (ref 31.4–37.4)
MCV RBC AUTO: 99 FL (ref 82–98)
MONOCYTES # BLD AUTO: 0.52 THOUSAND/ΜL (ref 0.17–1.22)
MONOCYTES NFR BLD AUTO: 9 % (ref 4–12)
NEUTROPHILS # BLD AUTO: 3.68 THOUSANDS/ΜL (ref 1.85–7.62)
NEUTS SEG NFR BLD AUTO: 66 % (ref 43–75)
NITRITE UR QL STRIP: NEGATIVE
NRBC BLD AUTO-RTO: 0 /100 WBCS
PH UR STRIP.AUTO: 5.5 [PH]
PLATELET # BLD AUTO: 272 THOUSANDS/UL (ref 149–390)
PMV BLD AUTO: 9.8 FL (ref 8.9–12.7)
POTASSIUM SERPL-SCNC: 4.1 MMOL/L (ref 3.5–5.3)
PROT SERPL-MCNC: 7.8 G/DL (ref 6.4–8.2)
PROT UR STRIP-MCNC: NEGATIVE MG/DL
RBC # BLD AUTO: 4.24 MILLION/UL (ref 3.81–5.12)
SODIUM SERPL-SCNC: 144 MMOL/L (ref 136–145)
SP GR UR STRIP.AUTO: 1.01 (ref 1–1.03)
UROBILINOGEN UR QL STRIP.AUTO: 0.2 E.U./DL
WBC # BLD AUTO: 5.63 THOUSAND/UL (ref 4.31–10.16)

## 2021-06-06 PROCEDURE — 83735 ASSAY OF MAGNESIUM: CPT | Performed by: EMERGENCY MEDICINE

## 2021-06-06 PROCEDURE — 81003 URINALYSIS AUTO W/O SCOPE: CPT | Performed by: EMERGENCY MEDICINE

## 2021-06-06 PROCEDURE — 36415 COLL VENOUS BLD VENIPUNCTURE: CPT | Performed by: EMERGENCY MEDICINE

## 2021-06-06 PROCEDURE — 80053 COMPREHEN METABOLIC PANEL: CPT | Performed by: EMERGENCY MEDICINE

## 2021-06-06 PROCEDURE — 96361 HYDRATE IV INFUSION ADD-ON: CPT

## 2021-06-06 PROCEDURE — 96374 THER/PROPH/DIAG INJ IV PUSH: CPT

## 2021-06-06 PROCEDURE — 96375 TX/PRO/DX INJ NEW DRUG ADDON: CPT

## 2021-06-06 PROCEDURE — 99284 EMERGENCY DEPT VISIT MOD MDM: CPT

## 2021-06-06 PROCEDURE — 76705 ECHO EXAM OF ABDOMEN: CPT

## 2021-06-06 PROCEDURE — 99284 EMERGENCY DEPT VISIT MOD MDM: CPT | Performed by: EMERGENCY MEDICINE

## 2021-06-06 PROCEDURE — 85025 COMPLETE CBC W/AUTO DIFF WBC: CPT | Performed by: EMERGENCY MEDICINE

## 2021-06-06 RX ORDER — ONDANSETRON 2 MG/ML
4 INJECTION INTRAMUSCULAR; INTRAVENOUS ONCE
Status: COMPLETED | OUTPATIENT
Start: 2021-06-06 | End: 2021-06-06

## 2021-06-06 RX ADMIN — FAMOTIDINE 20 MG: 10 INJECTION INTRAVENOUS at 11:05

## 2021-06-06 RX ADMIN — SODIUM CHLORIDE 1000 ML: 0.9 INJECTION, SOLUTION INTRAVENOUS at 11:03

## 2021-06-06 RX ADMIN — ONDANSETRON 4 MG: 2 INJECTION INTRAMUSCULAR; INTRAVENOUS at 11:04

## 2021-06-06 NOTE — ED PROVIDER NOTES
History  Chief Complaint   Patient presents with    Vomiting     Pt states she had been vomiting since thursday  Denies abd  pain, reports lower back pain from constant vomiting  82-year-old female presents for several days of nausea vomiting and diarrhea  The patient generally feels unwell and weak with multiple episodes of nonbloody nonbilious vomiting  Patient denies any bloody stools  Patient denies any trouble urination  She has had subjective fevers and chills  She denies any sick contacts or questionable food ingestions  She has not taken anything to make it better  Prior to Admission Medications   Prescriptions Last Dose Informant Patient Reported? Taking?    AMILoride 5 mg tablet   No No   Sig: Take 1 tablet by mouth twice daily   HYDROcodone-acetaminophen (NORCO) 5-325 mg per tablet   No No   Sig: Take 1 tablet by mouth every 6 (six) hours as needed for painMax Daily Amount: 4 tablets   Polyethylene Glycol 3350 (MIRALAX PO)  Self Yes No   Sig: Take by mouth as needed    QUEtiapine (SEROQUEL) 300 mg tablet  Self No No   Sig: Take 1 tablet (300 mg total) by mouth every morning 1 in the AM     ( also takes 400 mg at bedtime)   QUEtiapine (SEROquel) 100 mg tablet  Self Yes No   Sig: Take 1 tablet by mouth daily at bedtime 1 at bedtime in addition to 400 mg tab   QUEtiapine (SEROquel) 400 MG tablet  Self Yes No   Si at bedtime     (also takes 100 mg and 300 mg tabs)   acetaminophen (TYLENOL) 325 mg tablet  Self Yes No   Sig: Take 650 mg by mouth every 6 (six) hours as needed for mild pain   albuterol (Ventolin HFA) 90 mcg/act inhaler   No No   Sig: Inhale 2 puffs every 6 (six) hours as needed for wheezing or shortness of breath   aspirin (ECOTRIN LOW STRENGTH) 81 mg EC tablet  Self No No   Sig: Take 1 tablet (81 mg total) by mouth daily   atorvastatin (LIPITOR) 40 mg tablet   No No   Sig: Take 1 tablet (40 mg total) by mouth daily   budesonide-formoterol (SYMBICORT) 160-4 5 mcg/act inhaler   No No   Sig: Inhale 2 puffs 2 (two) times a day Rinse mouth after use  carvedilol (COREG) 3 125 mg tablet   No No   Sig: Take 1 tablet (3 125 mg total) by mouth 2 (two) times a day with meals   Patient taking differently: Take 6 25 mg by mouth 2 (two) times a day with meals    clonazePAM (KlonoPIN) 0 5 mg tablet   No No   Sig: Take 1 tablet (0 5 mg total) by mouth 3 (three) times a day   fluocinonide (LIDEX) 0 05 % ointment   No No   Sig: Apply topically 2 (two) times a day   fluvoxaMINE (LUVOX) 100 mg tablet  Self Yes No   Sig: 3 at bedtime   lamoTRIgine (LaMICtal) 200 MG tablet  Self Yes No   Sig: Take 400 mg by mouth daily at bedtime    linaCLOtide (Linzess) 290 MCG CAPS  Self No No   Sig: Take 1 capsule by mouth daily   naloxone (NARCAN) 4 mg/0 1 mL nasal spray  Self No No   Sig: Administer 1 spray into a nostril  If breathing does not return to normal or if breathing difficulty resumes after 2-3 minutes, give another dose in the other nostril using a new spray     omeprazole (PriLOSEC) 20 mg delayed release capsule   No No   Sig: Take 1 capsule (20 mg total) by mouth daily at bedtime   ondansetron (ZOFRAN-ODT) 4 mg disintegrating tablet   No No   Sig: DISSOLVE 1 TABLET IN MOUTH EVERY 8 HOURS AS NEEDED FOR NAUSEA AND VOMITING   traMADol (ULTRAM) 50 mg tablet   No No   Sig: Take 2 tabs by mouth twice daily      Facility-Administered Medications: None       Past Medical History:   Diagnosis Date    Anxiety     Anxiety disorder     Bipolar 2 disorder (HCC)     Chronic back pain     Closed fracture of distal end of right fibula with routine healing 11/4/2020    COVID-19     in Jan 2021    CVA (cerebral vascular accident) [facility])     noted on MRI in the past    Depression     GERD (gastroesophageal reflux disease)     Hypercholesteremia     Hypernatremia     Hypertension     Hypokalemia     Idiopathic chronic pancreatitis ([facility] [address] ) 3/20/2018    Intervertebral disc disorder with radiculopathy of lumbosacral region     resolved: 2015    Kidney disease     Panic attacks     Pericardial effusion     Radiculitis     resolved: 2015    Secondary renal hyperparathyroidism ([facility] [address] )     Vitamin D deficiency        Past Surgical History:   Procedure Laterality Date    BUNIONECTOMY      Left foot     COLON SURGERY      COLONOSCOPY  2021    DILATION AND CURETTAGE OF UTERUS      INDUCED       surgically induced    MN ANASTOMOSIS,AV,ANY SITE Left 2019    Procedure: CREATION FISTULA ARTERIOVENOUS (AV) left wrists possible left upper;  Surgeon: Day Lyons MD;  [facility];  Service: Vascular    MN CORRJ HALLUX VALGUS W/SESMDC W/DIST [facility] Crooked Left 2019    Procedure: Logan Gracia;  Surgeon: Devante Bauer DPM;  [facility];  Service: [address] W/SESMDC W/DIST [place] Crooked Right 8/3/2020    Procedure: Rosalinda Saini;  Surgeon: Devante Bauer DPM;  [facility];  Service: Podiatry    MN ERCP DX COLLECTION SPECIMEN BRUSHING/WASHING N/A 2018    Procedure: ENDOSCOPIC RETROGRADE CHOLANGIOPANCREATOGRAPHY (ERCP);   Surgeon: Saad Gaines MD;  [facility];  Service: Gastroenterology    MN LAP, SURG PROCTOPEXY N/A 2018    Procedure: ROBOTIC SIGMOID RESECTION / RECTOPEXY;  Surgeon: Leti Navarrete MD;  [facility];  Service: Colorectal    MN SIGMOIDOSCOPY FLX DX W/COLLJ SPEC BR/WA IF PFRMD N/A 2018    Procedure: Andrés Hunt;  Surgeon: Leti Navarrete MD;  [facility];  Service: Colorectal    TUBAL LIGATION Bilateral    [facility] THYROID BIOPSY  2019       Family History   Problem Relation Age of Onset    Bipolar disorder Mother     Mental illness Mother         depression    Stroke Mother     Dementia Mother     Colon polyps Mother     Heart disease Father     Hypertension Father     Diabetes Father     Other Family         Back disorder  Diabetes Family     Heart disease Family     Hypertension Family     Breast cancer Family     Stroke Family     Thyroid disease Family     Breast cancer Paternal Grandmother     Breast cancer Paternal [de-identified]     Breast cancer Maternal Aunt     Mental illness Sister     Colon polyps Sister     Mental illness Sister     Heart disease Sister     Breast cancer Sister     Breast cancer Sister     Other Son         pituitary tumor    Hypertension Son     Obesity Son     No Known Problems Son     Substance Abuse Neg Hx         neg fam hx    Colon cancer Neg Hx      I have reviewed and agree with the history as documented  E-Cigarette/Vaping    E-Cigarette Use Never User      E-Cigarette/Vaping Substances    Nicotine No     THC No     CBD No     Flavoring No     Other No     Unknown No      Social History     Tobacco Use    Smoking status: Never Smoker    Smokeless tobacco: Never Used   Substance Use Topics    Alcohol use: Never     Frequency: Never     Binge frequency: Never    Drug use: Not Currently     Types: Marijuana       Review of Systems   Constitutional: Positive for chills, fatigue and fever  Respiratory: Negative for shortness of breath  Cardiovascular: Negative for chest pain  Gastrointestinal: Positive for diarrhea, nausea and vomiting  Negative for abdominal pain  Genitourinary: Negative for hematuria  Musculoskeletal: Negative for back pain  All other systems reviewed and are negative  Physical Exam  Physical Exam  Vitals signs and nursing note reviewed  Constitutional:       General: She is not in acute distress  Appearance: She is well-developed  HENT:      Head: Normocephalic and atraumatic  Right Ear: External ear normal       Left Ear: External ear normal    Eyes:      General: No scleral icterus  Conjunctiva/sclera: Conjunctivae normal       Pupils: Pupils are equal, round, and reactive to light     Neck:      Musculoskeletal: Normal range of motion  Cardiovascular:      Rate and Rhythm: Normal rate and regular rhythm  Heart sounds: Normal heart sounds  Pulmonary:      Effort: Pulmonary effort is normal  No respiratory distress  Breath sounds: Normal breath sounds  Abdominal:      General: Bowel sounds are normal       Palpations: Abdomen is soft  Tenderness: There is abdominal tenderness in the epigastric area  There is no right CVA tenderness, left CVA tenderness, guarding or rebound  Musculoskeletal: Normal range of motion  Skin:     General: Skin is warm and dry  Findings: No rash  Neurological:      Mental Status: She is alert and oriented to person, place, and time           Vital Signs  ED Triage Vitals   Temperature Pulse Respirations Blood Pressure SpO2   06/06/21 1045 06/06/21 1045 06/06/21 1045 06/06/21 1100 06/06/21 1045   98 °F (36 7 °C) 87 20 (!) 175/82 96 %      Temp Source Heart Rate Source Patient Position - Orthostatic VS BP Location FiO2 (%)   06/06/21 1045 06/06/21 1045 06/06/21 1045 06/06/21 1045 --   Temporal Monitor Lying Right arm       Pain Score       --                  Vitals:    06/06/21 1115 06/06/21 1130 06/06/21 1145 06/06/21 1200   BP: 141/75 136/74  149/76   Pulse: 78 71 76 74   Patient Position - Orthostatic VS:    Lying         Visual Acuity      ED Medications  Medications   sodium chloride 0 9 % bolus 1,000 mL (0 mL Intravenous Stopped 6/6/21 1203)   ondansetron (ZOFRAN) injection 4 mg (4 mg Intravenous Given 6/6/21 1104)   famotidine (PEPCID) injection 20 mg (20 mg Intravenous Given 6/6/21 1105)       Diagnostic Studies  Results Reviewed     Procedure Component Value Units Date/Time    UA (URINE) with reflex to Scope [[id number]] Collected: 06/06/21 1324    Lab Status: Final result Specimen: Urine, Clean Catch Updated: 06/06/21 1330     Color, UA Yellow     Clarity, UA Clear     Specific Gravity, UA 1 010     pH, UA 5 5     Leukocytes, UA Negative     Nitrite, UA Negative Protein, UA Negative mg/dl      Glucose, UA Negative mg/dl      Ketones, UA Negative mg/dl      Urobilinogen, UA 0 2 E U /dl      Bilirubin, UA Negative     Blood, UA Negative    Comprehensive metabolic panel [[id number]]  (Abnormal) Collected: 06/06/21 1101    Lab Status: Final result Specimen: Blood from Arm, Right Updated: 06/06/21 1128     Sodium 144 mmol/L      Potassium 4 1 mmol/L      Chloride 107 mmol/L      CO2 24 mmol/L      ANION GAP 13 mmol/L      BUN 25 mg/dL      Creatinine 2 14 mg/dL      Glucose 151 mg/dL      Calcium 9 2 mg/dL      AST 86 U/L       U/L      Alkaline Phosphatase 209 U/L      Total Protein 7 8 g/dL      Albumin 3 8 g/dL      Total Bilirubin 0 40 mg/dL      eGFR 25 ml/min/1 73sq m     Narrative:      National Kidney Disease Foundation guidelines for Chronic Kidney Disease (CKD):     Stage 1 with normal or high GFR (GFR > 90 mL/min/1 73 square meters)    Stage 2 Mild CKD (GFR = 60-89 mL/min/1 73 square meters)    Stage 3A Moderate CKD (GFR = 45-59 mL/min/1 73 square meters)    Stage 3B Moderate CKD (GFR = 30-44 mL/min/1 73 square meters)    Stage 4 Severe CKD (GFR = 15-29 mL/min/1 73 square meters)    Stage 5 End Stage CKD (GFR <15 mL/min/1 73 square meters)  Note: GFR calculation is accurate only with a steady state creatinine    Magnesium [[id number]]  (Normal) Collected: 06/06/21 1101    Lab Status: Final result Specimen: Blood from Arm, Right Updated: 06/06/21 1128     Magnesium 2 0 mg/dL     CBC and differential [[id number]]  (Abnormal) Collected: 06/06/21 1101    Lab Status: Final result Specimen: Blood from Arm, Right Updated: 06/06/21 1110     WBC 5 63 Thousand/uL      RBC 4 24 Million/uL      Hemoglobin 13 8 g/dL      Hematocrit 41 9 %      MCV 99 fL      MCH 32 5 pg      MCHC 32 9 g/dL      RDW 12 1 %      MPV 9 8 fL      Platelets 554 Thousands/uL      nRBC 0 /100 WBCs      Neutrophils Relative 66 %      Immat GRANS % 0 %      Lymphocytes Relative 23 % Monocytes Relative 9 %      Eosinophils Relative 1 %      Basophils Relative 1 %      Neutrophils Absolute 3 68 Thousands/µL      Immature Grans Absolute 0 01 Thousand/uL      Lymphocytes Absolute 1 27 Thousands/µL      Monocytes Absolute 0 52 Thousand/µL      Eosinophils Absolute 0 08 Thousand/µL      Basophils Absolute 0 07 Thousands/µL                  US gallbladder   Final Result by Kathi Vaca MD (06/06 1318)      1  Limited examination due to overlying gastrointestinal gas  2   Nonspecific mild dilatation of the common bile duct, measuring up to 9 mm, with no intrahepatic ductal dilatation  If there is concern regarding bile duct obstruction, consider further evaluation with MRI of the abdomen/MRCP  3   Hyperechoic right kidney, consistent with a variety of renal parenchymal disorders  Workstation performed: [id number]                    Procedures  Procedures         ED Course                             SBIRT 20yo+      Most Recent Value   SBIRT (22 yo +)   In order to provide better care to our patients, we are screening all of our patients for alcohol and drug use  Would it be okay to ask you these screening questions? Yes Filed at: 06/06/2021 1110   Initial Alcohol Screen: US AUDIT-C    1  How often do you have a drink containing alcohol?  0 Filed at: 06/06/2021 1110   2  How many drinks containing alcohol do you have on a typical day you are drinking? 0 Filed at: 06/06/2021 1110   3a  Male UNDER 65: How often do you have five or more drinks on one occasion? 0 Filed at: 06/06/2021 1110   3b  FEMALE Any Age, or MALE 65+: How often do you have 4 or more drinks on one occassion? 0 Filed at: 06/06/2021 1110   Audit-C Score  0 Filed at: 06/06/2021 1110   ZAYRA: How many times in the past year have you    Used an illegal drug or used a prescription medication for non-medical reasons?   Never Filed at: 06/06/2021 1110                    MDM  Number of Diagnoses or Management Options  Abnormal ultrasound of biliary tract:   Nausea vomiting and diarrhea:   Transaminitis:   Diagnosis management comments: Differential diagnosis:  Viral enteritis, viral syndrome, biliary colic, acute cholecystitis, GERD  Plan is for laboratories IV fluid, Zofran and Pepcid  I note the patient mildly elevated transaminases  The plan is to obtain ultrasound to rule out acute biliary colic  Ultrasound results reviewed and discussed with patient  I discussed the mildly dilated biliary duct as well as her mild elevations in her LFTs  The patient is extensively followed by Dr [facility] G[facility]  The patient does not have rebound tenderness or signs of a peritoneal abdomen on examination and therefore I will discharge patient to follow-up with GI for outpatient potential ERCP based off of the ultrasound findings which the patient was informed of  The patient verbalizes the discharge instructions and warnings as well as the need for follow-up with GI to follow up on the abnormal test here in the emergency department         Amount and/or Complexity of Data Reviewed  Clinical lab tests: reviewed  Tests in the radiology section of CPT®: reviewed  Decide to obtain previous medical records or to obtain history from someone other than the patient: yes  Review and summarize past medical records: yes (GI notes reviewed as well as previous laboratories)        Disposition  Final diagnoses:   Nausea vomiting and diarrhea   Abnormal ultrasound of biliary tract   Transaminitis     Time reflects when diagnosis was documented in both MDM as applicable and the Disposition within this note     Time User Action Codes Description Comment    6/6/2021  1:32 PM Marie Crown Add [R11 2,  R19 7] Nausea vomiting and diarrhea     6/6/2021  1:33 PM Marie Crown Add [R93 2] Abnormal ultrasound of biliary tract     6/6/2021  1:33 PM Marie Crown Add [R74 01] Transaminitis       ED Disposition     ED Disposition Condition Date/Time Comment    Discharge Stable Sun Jun 6, 2021  1:32 PM Jazzy discharge to home/self care  Follow-up Information     Follow up With Specialties Details Why Contact Info    Nathanel Lanes, MD Gastroenterology Schedule an appointment as soon as possible for a visit  For further evaluation, to follow up on abnormal test  [address]  [address] [facility] [address] [phone]            Patient's Medications   Discharge Prescriptions    No medications on file     No discharge procedures on file      PDMP Review       Value Time User    PDMP Reviewed  Yes 4/21/2021  5:03 PM Ju Carrillo DO          ED Provider  Electronically Signed by           Iraj Lopez DO  06/06/21 5141

## 2021-06-07 ENCOUNTER — TELEPHONE (OUTPATIENT)
Dept: GASTROENTEROLOGY | Facility: CLINIC | Age: 61
End: 2021-06-07

## 2021-06-07 DIAGNOSIS — R79.89 ELEVATED LFTS: Primary | ICD-10-CM

## 2021-06-07 DIAGNOSIS — K83.8 DILATED CBD, ACQUIRED: ICD-10-CM

## 2021-06-07 NOTE — TELEPHONE ENCOUNTER
Pt states Thur night she awoke w/ pain; since has had vomiting & diarrhea; went to [facility] yesterday/had [address] - was advised she has inflamed biliary tract; rates pain #5; eating very little & vomiting; pain increased when doing US at ER

## 2021-06-07 NOTE — TELEPHONE ENCOUNTER
Pt started with nausea/vomiting and dry heaving along with diarrhea, then started with epigastric pain with palpation/movement  Nausea/vomiting has subsided and diarrhea is improving  Pt has history of chronically elevated alk phos in and has had ERCP due to dilated CBD and prominent ampulla on MRI in the past  Will recheck CMP in a week, elevated LFTs may have been secondary to gastroenteritis which seems like is improving  If transaminases do not improve or worsen, could consider doing further workup with labs, MRI/MRCP  Pt was told to call back if symptoms worsen or fail to improve

## 2021-06-10 ENCOUNTER — LAB (OUTPATIENT)
Dept: LAB | Facility: HOSPITAL | Age: 61
End: 2021-06-10
Payer: MEDICARE

## 2021-06-10 ENCOUNTER — TELEPHONE (OUTPATIENT)
Dept: NEPHROLOGY | Facility: CLINIC | Age: 61
End: 2021-06-10

## 2021-06-10 ENCOUNTER — OFFICE VISIT (OUTPATIENT)
Dept: OBGYN CLINIC | Facility: CLINIC | Age: 61
End: 2021-06-10

## 2021-06-10 VITALS
WEIGHT: 205 LBS | DIASTOLIC BLOOD PRESSURE: 80 MMHG | BODY MASS INDEX: 32.18 KG/M2 | HEIGHT: 67 IN | SYSTOLIC BLOOD PRESSURE: 122 MMHG

## 2021-06-10 DIAGNOSIS — K83.8 DILATED CBD, ACQUIRED: ICD-10-CM

## 2021-06-10 DIAGNOSIS — S82.64XA CLOSED NONDISPLACED FRACTURE OF LATERAL MALLEOLUS OF RIGHT FIBULA, INITIAL ENCOUNTER: Primary | ICD-10-CM

## 2021-06-10 DIAGNOSIS — R79.89 ELEVATED LFTS: ICD-10-CM

## 2021-06-10 DIAGNOSIS — S92.515D CLOSED NONDISPLACED FRACTURE OF PROXIMAL PHALANX OF LESSER TOE OF LEFT FOOT WITH ROUTINE HEALING, SUBSEQUENT ENCOUNTER: ICD-10-CM

## 2021-06-10 LAB
ALBUMIN SERPL BCP-MCNC: 4.4 G/DL (ref 3.5–5)
ALP SERPL-CCNC: 199 U/L (ref 46–116)
ALT SERPL W P-5'-P-CCNC: 73 U/L (ref 12–78)
ANION GAP SERPL CALCULATED.3IONS-SCNC: 8 MMOL/L (ref 4–13)
AST SERPL W P-5'-P-CCNC: 29 U/L (ref 5–45)
BILIRUB SERPL-MCNC: 0.49 MG/DL (ref 0.2–1)
BUN SERPL-MCNC: 29 MG/DL (ref 5–25)
CALCIUM SERPL-MCNC: 10 MG/DL (ref 8.3–10.1)
CHLORIDE SERPL-SCNC: 112 MMOL/L (ref 100–108)
CO2 SERPL-SCNC: 18 MMOL/L (ref 21–32)
CREAT SERPL-MCNC: 2.37 MG/DL (ref 0.6–1.3)
GFR SERPL CREATININE-BSD FRML MDRD: 22 ML/MIN/1.73SQ M
GLUCOSE P FAST SERPL-MCNC: 111 MG/DL (ref 65–99)
POTASSIUM SERPL-SCNC: 3.7 MMOL/L (ref 3.5–5.3)
PROT SERPL-MCNC: 8 G/DL (ref 6.4–8.2)
SODIUM SERPL-SCNC: 138 MMOL/L (ref 136–145)

## 2021-06-10 PROCEDURE — 80053 COMPREHEN METABOLIC PANEL: CPT

## 2021-06-10 PROCEDURE — 99024 POSTOP FOLLOW-UP VISIT: CPT | Performed by: ORTHOPAEDIC SURGERY

## 2021-06-10 PROCEDURE — 36415 COLL VENOUS BLD VENIPUNCTURE: CPT

## 2021-06-10 NOTE — PROGRESS NOTES
Assessment:     1  Closed nondisplaced fracture of lateral malleolus of right fibula, initial encounter    2  Closed nondisplaced fracture of proximal phalanx of lesser toe of left foot with routine healing, subsequent encounter        Plan:     Problem List Items Addressed This Visit        Musculoskeletal and Integument    Closed nondisplaced fracture of proximal phalanx of lesser toe of left foot     Findings consistent with left nondisplaced proximal phalanx fracture of the small toe- healing  Findings and treatment options were discussed with the patient  She is doing very well  She may discontinue the postop shoe and transition to a sneaker  Resume activities as tolerated  She may return to low-impact exercises  Avoid any running or jumping  Follow-up as needed if any problems arise  All questions were answered to patient's satisfaction  Plan discussed with Dr Brigida Dakins  Other Visit Diagnoses     Closed nondisplaced fracture of lateral malleolus of right fibula, initial encounter    -  Primary               Subjective:     Patient ID: Meredith Cochran is a 64 y o  female  Chief Complaint: This is a 42-year-old white female following up for a left nondisplaced proximal phalanx fracture of the small toe  She states that on May 3, 2021 she hit her left small toe directly against the leg of a table  She wore a postop shoe when ambulating until recently  She arrives in flip-flops today  She states she has no pain when ambulating      Allergy:  Allergies   Allergen Reactions    Pollen Extract Nasal Congestion     Medications:  all current active meds have been reviewed  Past Medical History:  Past Medical History:   Diagnosis Date    Anxiety     Anxiety disorder     Bipolar 2 disorder ([facility] [address] )     Chronic back pain     Closed fracture of distal end of right fibula with routine healing 11/4/2020    COVID-19     in Jan 2021    CVA ([facility])     noted on MRI in the past    Depression     GERD (gastroesophageal reflux disease)     Hypercholesteremia     Hypernatremia     Hypertension     Hypokalemia     Idiopathic chronic pancreatitis ([facility] [address] ) 3/20/2018    Intervertebral disc disorder with radiculopathy of lumbosacral region     resolved: 2015    Kidney disease     Panic attacks     Pericardial effusion     Radiculitis     resolved: 2015    Secondary renal hyperparathyroidism ([facility] [address] )     Vitamin D deficiency      Past Surgical History:  Past Surgical History:   Procedure Laterality Date    BUNIONECTOMY      Left foot     COLON SURGERY      COLONOSCOPY  2021    DILATION AND CURETTAGE OF UTERUS      INDUCED       surgically induced    NV ANASTOMOSIS,AV,ANY SITE Left 2019    Procedure: CREATION FISTULA ARTERIOVENOUS (AV) left wrists possible left upper;  Surgeon: Liz Aceves MD;  [facility];  Service: Vascular    NV [place] W/SESMDC W/DIST Zak Diana Left 2019    Procedure: Ki Sabquan;  Surgeon: Bhavani Farias DPM;  [facility];  Service: [address] [place] [address] W/SESMDC W/DIST Zak Diana Right 8/3/2020    Procedure: Bahman Koby;  Surgeon: Bhavani Farias DPM;  [facility];  Service: Podiatry    NV ERCP DX COLLECTION SPECIMEN BRUSHING/WASHING N/A 2018    Procedure: ENDOSCOPIC RETROGRADE CHOLANGIOPANCREATOGRAPHY (ERCP);   Surgeon: Any Rocha MD;  [facility];  Service: Gastroenterology    NV LAP, SURG PROCTOPEXY N/A 2018    Procedure: ROBOTIC SIGMOID RESECTION / RECTOPEXY;  Surgeon: Harrison Anna MD;  [facility];  Service: Colorectal    NV SIGMOIDOSCOPY FLX DX W/COLLJ SPEC BR/WA IF PFRMD N/A 2018    Procedure: Regla Sullivan;  Surgeon: Harrison Anna MD;  [facility];  Service: Colorectal    TUBAL LIGATION Bilateral    [facility] 47 THYROID BIOPSY  2019     Family History:  Family History   Problem Relation Age of Onset    Bipolar disorder Mother     Mental illness Mother         depression    Stroke Mother     Dementia Mother     Colon polyps Mother     Heart disease Father     Hypertension Father     Diabetes Father     Other Family         Back disorder    Diabetes Family     Heart disease Family     Hypertension Family     Breast cancer Family     Stroke Family     Thyroid disease Family     Breast cancer Paternal Grandmother     Breast cancer Paternal [de-identified]     Breast cancer Maternal Aunt     Mental illness Sister     Colon polyps Sister     Mental illness Sister     Heart disease Sister     Breast cancer Sister     Breast cancer Sister     Other Son         pituitary tumor    Hypertension Son     Obesity Son     No Known Problems Son     Substance Abuse Neg Hx         neg fam hx    Colon cancer Neg Hx      Social History:  Social History     Substance and Sexual Activity   Alcohol Use Never    Frequency: Never    Binge frequency: Never     Social History     Substance and Sexual Activity   Drug Use Not Currently    Types: Marijuana     Social History     Tobacco Use   Smoking Status Never Smoker   Smokeless Tobacco Never Used     Review of Systems   Constitutional: Negative  HENT: Negative  Eyes: Negative  Respiratory: Negative  Cardiovascular: Negative  Gastrointestinal: Negative  Endocrine: Negative  Genitourinary: Negative  Musculoskeletal: Negative for arthralgias and gait problem  Skin: Negative  Allergic/Immunologic: Negative  Neurological: Negative  Hematological: Negative  Psychiatric/Behavioral: Negative  Objective:  BP Readings from Last 1 Encounters:   06/10/21 122/80      Wt Readings from Last 1 Encounters:   06/10/21 93 kg (205 lb)      BMI:   Estimated body mass index is 32 11 kg/m² as calculated from the following:    Height as of this encounter: 5' 7" (1 702 m)      Weight as of this encounter: 93 kg (205 lb)  BSA:   Estimated body surface area is 2 04 meters squared as calculated from the following:    Height as of this encounter: 5' 7" (1 702 m)  Weight as of this encounter: 93 kg (205 lb)  Physical Exam  Constitutional:       General: She is not in acute distress  Appearance: She is well-developed  HENT:      Head: Normocephalic  Eyes:      Conjunctiva/sclera: Conjunctivae normal       Pupils: Pupils are equal, round, and reactive to light  Pulmonary:      Effort: Pulmonary effort is normal  No respiratory distress  Musculoskeletal:         General: Tenderness present  Skin:     General: Skin is warm and dry  Neurological:      Mental Status: She is alert and oriented to person, place, and time  Psychiatric:         Behavior: Behavior normal        Left Ankle Exam     Range of Motion   The patient has normal left ankle ROM  Muscle Strength   The patient has normal left ankle strength  Other   Erythema: absent  Sensation: normal  Pulse: present    Comments:   Nontender over proximal phalanx of small toe    Moving all toes   capillary refill brisk            No new imaging       Procedures

## 2021-06-10 NOTE — ASSESSMENT & PLAN NOTE
Findings consistent with left nondisplaced proximal phalanx fracture of the small toe- healing  Findings and treatment options were discussed with the patient  She is doing very well  She may discontinue the postop shoe and transition to a sneaker  Resume activities as tolerated  She may return to low-impact exercises  Avoid any running or jumping  Follow-up as needed if any problems arise  All questions were answered to patient's satisfaction  Plan discussed with Dr Fitch Copper

## 2021-06-16 ENCOUNTER — HOSPITAL ENCOUNTER (OUTPATIENT)
Dept: MAMMOGRAPHY | Facility: IMAGING CENTER | Age: 61
Discharge: HOME | End: 2021-06-16
Payer: MEDICARE

## 2021-06-16 ENCOUNTER — HOSPITAL ENCOUNTER (OUTPATIENT)
Dept: BONE DENSITY | Facility: IMAGING CENTER | Age: 61
Discharge: HOME | End: 2021-06-16
Payer: MEDICARE

## 2021-06-16 VITALS — BODY MASS INDEX: 33.12 KG/M2 | WEIGHT: 211 LBS | HEIGHT: 67 IN

## 2021-06-16 DIAGNOSIS — S82.64XD CLOSED NONDISPLACED FRACTURE OF LATERAL MALLEOLUS OF RIGHT FIBULA WITH ROUTINE HEALING, SUBSEQUENT ENCOUNTER: ICD-10-CM

## 2021-06-16 DIAGNOSIS — M81.6 LOCALIZED OSTEOPOROSIS (LEQUESNE): ICD-10-CM

## 2021-06-16 DIAGNOSIS — R06.02 SOB (SHORTNESS OF BREATH): ICD-10-CM

## 2021-06-16 DIAGNOSIS — Z12.31 ENCOUNTER FOR SCREENING MAMMOGRAM FOR MALIGNANT NEOPLASM OF BREAST: ICD-10-CM

## 2021-06-16 PROCEDURE — 77063 BREAST TOMOSYNTHESIS BI: CPT

## 2021-06-16 PROCEDURE — 77080 DXA BONE DENSITY AXIAL: CPT

## 2021-06-16 PROCEDURE — 77067 SCR MAMMO BI INCL CAD: CPT

## 2021-06-16 RX ORDER — HYDROCODONE BITARTRATE AND ACETAMINOPHEN 5; 325 MG/1; MG/1
1 TABLET ORAL EVERY 6 HOURS PRN
Qty: 20 TABLET | Refills: 0 | Status: SHIPPED | OUTPATIENT
Start: 2021-06-16 | End: 2021-06-28 | Stop reason: SDUPTHER

## 2021-06-22 ENCOUNTER — TELEPHONE (OUTPATIENT)
Dept: FAMILY MEDICINE CLINIC | Facility: HOSPITAL | Age: 61
End: 2021-06-22

## 2021-06-23 ENCOUNTER — TELEPHONE (OUTPATIENT)
Dept: FAMILY MEDICINE CLINIC | Facility: HOSPITAL | Age: 61
End: 2021-06-23

## 2021-06-23 ENCOUNTER — TELEPHONE (OUTPATIENT)
Dept: OTHER | Facility: OTHER | Age: 61
End: 2021-06-23

## 2021-06-23 DIAGNOSIS — I10 ESSENTIAL HYPERTENSION: ICD-10-CM

## 2021-06-23 DIAGNOSIS — N28.1 RENAL CYST: ICD-10-CM

## 2021-06-23 DIAGNOSIS — N18.30 CHRONIC KIDNEY DISEASE, STAGE 3 (HCC): ICD-10-CM

## 2021-06-23 DIAGNOSIS — E23.2 DIABETES INSIPIDUS (HCC): ICD-10-CM

## 2021-06-23 DIAGNOSIS — N25.81 SECONDARY RENAL HYPERPARATHYROIDISM (HCC): ICD-10-CM

## 2021-06-23 RX ORDER — CARVEDILOL 3.12 MG/1
6.25 TABLET ORAL 2 TIMES DAILY WITH MEALS
Qty: 180 TABLET | Refills: 3 | Status: SHIPPED | OUTPATIENT
Start: 2021-06-23 | End: 2021-12-21 | Stop reason: SDUPTHER

## 2021-06-23 NOTE — TELEPHONE ENCOUNTER
LDMOV for pt  Elena pierce  Ef sent fosamax to pharm the other day  I explained to pt on how to take med    dk

## 2021-06-28 DIAGNOSIS — S82.64XD CLOSED NONDISPLACED FRACTURE OF LATERAL MALLEOLUS OF RIGHT FIBULA WITH ROUTINE HEALING, SUBSEQUENT ENCOUNTER: ICD-10-CM

## 2021-06-28 RX ORDER — HYDROCODONE BITARTRATE AND ACETAMINOPHEN 5; 325 MG/1; MG/1
1 TABLET ORAL EVERY 6 HOURS PRN
Qty: 20 TABLET | Refills: 0 | Status: SHIPPED | OUTPATIENT
Start: 2021-06-28 | End: 2021-07-07 | Stop reason: SDUPTHER

## 2021-06-29 ENCOUNTER — TELEPHONE (OUTPATIENT)
Dept: FAMILY MEDICINE CLINIC | Facility: HOSPITAL | Age: 61
End: 2021-06-29

## 2021-06-29 NOTE — TELEPHONE ENCOUNTER
Ldmom for pt that as long as she remains upright and take w/large glass of water she should be fine  If any problems call back    CR

## 2021-07-07 ENCOUNTER — APPOINTMENT (OUTPATIENT)
Dept: RADIOLOGY | Facility: CLINIC | Age: 61
End: 2021-07-07
Payer: MEDICARE

## 2021-07-07 ENCOUNTER — OFFICE VISIT (OUTPATIENT)
Dept: PODIATRY | Facility: CLINIC | Age: 61
End: 2021-07-07
Payer: MEDICARE

## 2021-07-07 VITALS
WEIGHT: 211 LBS | HEART RATE: 83 BPM | HEIGHT: 67 IN | DIASTOLIC BLOOD PRESSURE: 86 MMHG | BODY MASS INDEX: 33.12 KG/M2 | SYSTOLIC BLOOD PRESSURE: 151 MMHG

## 2021-07-07 DIAGNOSIS — M20.61 ACQUIRED DEFORMITIES OF TOE, RIGHT: ICD-10-CM

## 2021-07-07 DIAGNOSIS — S82.64XS CLOSED NONDISPLACED FRACTURE OF LATERAL MALLEOLUS OF RIGHT FIBULA, SEQUELA: ICD-10-CM

## 2021-07-07 DIAGNOSIS — S82.64XS CLOSED NONDISPLACED FRACTURE OF LATERAL MALLEOLUS OF RIGHT FIBULA, SEQUELA: Primary | ICD-10-CM

## 2021-07-07 DIAGNOSIS — S82.64XD CLOSED NONDISPLACED FRACTURE OF LATERAL MALLEOLUS OF RIGHT FIBULA WITH ROUTINE HEALING, SUBSEQUENT ENCOUNTER: ICD-10-CM

## 2021-07-07 PROCEDURE — 73610 X-RAY EXAM OF ANKLE: CPT

## 2021-07-07 PROCEDURE — 99213 OFFICE O/P EST LOW 20 MIN: CPT | Performed by: PODIATRIST

## 2021-07-07 PROCEDURE — 73630 X-RAY EXAM OF FOOT: CPT

## 2021-07-07 RX ORDER — HYDROCODONE BITARTRATE AND ACETAMINOPHEN 5; 325 MG/1; MG/1
1 TABLET ORAL EVERY 6 HOURS PRN
Qty: 20 TABLET | Refills: 0 | Status: SHIPPED | OUTPATIENT
Start: 2021-07-07 | End: 2021-07-19 | Stop reason: SDUPTHER

## 2021-07-07 NOTE — PROGRESS NOTES
PATIENT:  Dona Mae  1960       ASSESSMENT:     1  Closed nondisplaced fracture of lateral malleolus of right fibula, sequela  XR ankle 3+ vw right   2  Acquired deformities of toe, right  XR foot 3+ vw right          PLAN:  1  Previous office note was reviewed  Patient was counseled and educated on the condition and the diagnosis  2  The diagnosis, treatment options and prognosis were discussed with the patient  3  Sent her for right foot and ankle X-ray  4  Discussed possible surgical options as well  She will return in 2 weeks  For follow-up  Subjective:     HPI  The patient presents for evaluation of left 5th toe fracture  She feels well with minimal pain  She complained of right ankle pain, especially she goes up and down steps  She also feels right 2nd toe is still overlapping great toe even after bunion surgery  No acute edema  The following portions of the patient's history were reviewed and updated as appropriate: allergies, current medications, past family history, past medical history, past social history, past surgical history and problem list   All pertinent labs and images were reviewed        Past Medical History  Past Medical History:   Diagnosis Date    Anxiety     Anxiety disorder     Bipolar 2 disorder ([facility] [address] )     Chronic back pain     Closed fracture of distal end of right fibula with routine healing 11/4/2020    COVID-19     in Jan 2021    [facility])     noted on MRI in the past    Depression     GERD (gastroesophageal reflux disease)     Hypercholesteremia     Hypernatremia     Hypertension     Hypokalemia     Idiopathic chronic pancreatitis ([facility] [address] ) 3/20/2018    Intervertebral disc disorder with radiculopathy of lumbosacral region     resolved: 12/28/2015    Kidney disease     Panic attacks     Pericardial effusion     Radiculitis     resolved: 12/28/2015    [facility]     Vitamin D deficiency        Past Surgical History  Past Surgical History:   Procedure Laterality Date    BUNIONECTOMY      Left foot     COLON SURGERY      COLONOSCOPY  2021    DILATION AND CURETTAGE OF UTERUS      INDUCED       surgically induced    DE ANASTOMOSIS,AV,ANY SITE Left 2019    Procedure: CREATION FISTULA ARTERIOVENOUS (AV) left wrists possible left upper;  Surgeon: Aristeo Bull MD;  [facility];  Service: Vascular    DE CORRJ [place]ALL[place] VALGUS W/SESMDC W/DIST Jordis Caprice Left 2019    Procedure: Sasha Stapler;  Surgeon: Fran Garcia DPM;  [facility];  Service: [address] W/SESMDC W/DIST Jordis Caprice Right 8/3/2020    Procedure: Cohn Haidudley;  Surgeon: Fran Garcia DPM;  [facility];  Service: Podiatry    DE ERCP DX COLLECTION SPECIMEN BRUSHING/WASHING N/A 2018    Procedure: ENDOSCOPIC RETROGRADE CHOLANGIOPANCREATOGRAPHY (ERCP);   Surgeon: Rohan Roberts MD;  [facility];  Service: Gastroenterology    DE LAP, SURG PROCTOPEXY N/A 2018    Procedure: ROBOTIC SIGMOID RESECTION / RECTOPEXY;  Surgeon: Marjorie Pugh MD;  [facility];  Service: Colorectal    DE SIGMOIDOSCOPY FLX DX W/COLLJ SPEC BR/WA IF PFRMD N/A 2018    Procedure: SIGMOIDOSCOPY FLEXIBLE;  Surgeon: Marjorie Pugh MD;  [facility];  Service: Colorectal    TUBAL LIGATION Bilateral 1997    US GUIDED THYROID BIOPSY  2019        Allergies:  Pollen extract    Medications:  Current Outpatient Medications   Medication Sig Dispense Refill    acetaminophen (TYLENOL) 325 mg tablet Take 650 mg by mouth every 6 (six) hours as needed for mild pain      albuterol (Ventolin HFA) 90 mcg/act inhaler Inhale 2 puffs every 6 (six) hours as needed for wheezing or shortness of breath 8 5 g 3    alendronate (Fosamax) 70 mg tablet Take 1 tablet (70 mg total) by mouth every 7 days 4 tablet 5    AMILoride 5 mg tablet Take 1 tablet by mouth twice daily 120 tablet 0    aspirin (ECOTRIN LOW STRENGTH) 81 mg EC tablet Take 1 tablet (81 mg total) by mouth daily 30 tablet 0    atorvastatin (LIPITOR) 40 mg tablet Take 1 tablet (40 mg total) by mouth daily 90 tablet 3    budesonide-formoterol (SYMBICORT) 160-4 5 mcg/act inhaler Inhale 2 puffs 2 (two) times a day Rinse mouth after use  30 6 g 3    carvedilol (COREG) 3 125 mg tablet Take 2 tablets (6 25 mg total) by mouth 2 (two) times a day with meals 180 tablet 3    clonazePAM (KlonoPIN) 0 5 mg tablet Take 1 tablet (0 5 mg total) by mouth 3 (three) times a day 270 tablet 0    fluocinonide (LIDEX) 0 05 % ointment Apply topically 2 (two) times a day 60 g 1    fluvoxaMINE (LUVOX) 100 mg tablet 3 at bedtime      HYDROcodone-acetaminophen (NORCO) 5-325 mg per tablet Take 1 tablet by mouth every 6 (six) hours as needed for painMax Daily Amount: 4 tablets 20 tablet 0    lamoTRIgine (LaMICtal) 200 MG tablet Take 400 mg by mouth daily at bedtime       naloxone (NARCAN) 4 mg/0 1 mL nasal spray Administer 1 spray into a nostril  If breathing does not return to normal or if breathing difficulty resumes after 2-3 minutes, give another dose in the other nostril using a new spray   1 each 1    omeprazole (PriLOSEC) 20 mg delayed release capsule Take 1 capsule (20 mg total) by mouth daily at bedtime 90 capsule 3    ondansetron (ZOFRAN-ODT) 4 mg disintegrating tablet DISSOLVE 1 TABLET IN MOUTH EVERY 8 HOURS AS NEEDED FOR NAUSEA AND VOMITING 180 tablet 0    Polyethylene Glycol 3350 (MIRALAX PO) Take by mouth as needed       QUEtiapine (SEROquel) 100 mg tablet Take 1 tablet by mouth daily at bedtime 1 at bedtime in addition to 400 mg tab      QUEtiapine (SEROQUEL) 300 mg tablet Take 1 tablet (300 mg total) by mouth every morning 1 in the AM     ( also takes 400 mg at bedtime) 90 tablet 3    QUEtiapine (SEROquel) 400 MG tablet 1 at bedtime     (also takes 100 mg and 300 mg tabs)      traMADol (ULTRAM) 50 mg tablet Take 2 tabs by mouth twice daily 120 tablet 0    linaCLOtide (Linzess) 290 MCG CAPS Take 1 capsule by mouth daily 90 capsule 3     No current facility-administered medications for this visit  Social History:  Social History     Socioeconomic History    Marital status:      Spouse name: None    Number of children: None    Years of education: None    Highest education level: None   Occupational History    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   Tobacco Use    Smoking status: Never Smoker    Smokeless tobacco: Never Used   Vaping Use    Vaping Use: Never used   Substance and Sexual Activity    Alcohol use: Never    Drug use: Not Currently     Types: Marijuana    Sexual activity: Not Currently   Other Topics Concern    None   Social History Narrative    Daily caffeine consumption 2-3 servings a day    Lives with family  No living will  Has dentures---no dental care  Primary language--English  Feels safe at home  Social Determinants of Health     Financial Resource Strain:     Difficulty of Paying Living Expenses:    Food Insecurity:     Worried About Running Out of Food in the Last Year:     [address] in the Last Year:    Transportation Needs: No Transportation Needs    Lack of Transportation (Medical): No    Lack of Transportation (Non-Medical): No   Physical Activity: Inactive    Days of Exercise per Week: 0 days    Minutes of Exercise per Session: 0 min   Stress: Stress Concern Present    Feeling of Stress :  To some extent   Social Connections:     Frequency of Communication with Friends and Family:     Frequency of Social Gatherings with Friends and Family:     Attends [employer] Services:     Active Member of Clubs or Organizations:     Attends Club or Organization Meetings:     Marital Status:    Intimate Partner Violence: Not At Risk    Fear of Current or Ex-Partner: No    Emotionally Abused: No    Physically Abused: No    Sexually Abused: No          Review of Systems   Constitutional: Negative for chills and fever  HENT: Negative for sore throat  Respiratory: Negative  Cardiovascular: Negative  Gastrointestinal: Negative  Neurological: Negative for weakness and numbness  Objective:      /86   Pulse 83   Ht 5' 7" (1 702 m) Comment: verbal  Wt 95 7 kg (211 lb)   LMP  (LMP Unknown)   BMI 33 05 kg/m²          Physical Exam  Vitals reviewed  Constitutional:       General: She is not in acute distress  Appearance: She is not toxic-appearing or diaphoretic  Cardiovascular:      Rate and Rhythm: Normal rate and regular rhythm  Pulses:           Dorsalis pedis pulses are 2+ on the right side and 2+ on the left side  Posterior tibial pulses are 1+ on the right side and 1+ on the left side  Comments: No cyanosis  CRT WNL all toes  Pulmonary:      Effort: Pulmonary effort is normal  No respiratory distress  Musculoskeletal:         General: Tenderness and deformity present  Normal range of motion  Right lower leg: No edema  Left lower leg: No edema  Right foot: No foot drop  Left foot: No foot drop  Comments: Lateral angulation of bilateral great toe  Medial deviation of right 2nd toe  Hammertoe right 2nd toe  Diffuse tenderness on right anteriolateral ankle  No acute edema  Ankle ROM intact right  No ankle instability right  Skin:     General: Skin is warm and dry  Capillary Refill: Capillary refill takes less than 2 seconds  Coloration: Skin is not cyanotic or mottled  Findings: No abscess, petechiae, rash or wound  Nails: There is no clubbing  Neurological:      General: No focal deficit present  Mental Status: She is alert and oriented to person, place, and time  Cranial Nerves: No cranial nerve deficit  Sensory: No sensory deficit  Motor: No weakness        Coordination: Coordination normal    Psychiatric:         Mood and Affect: Mood normal          Behavior: Behavior normal          Thought Content:  Thought content normal          Judgment: Judgment normal

## 2021-07-14 ENCOUNTER — TELEPHONE (OUTPATIENT)
Dept: GASTROENTEROLOGY | Facility: CLINIC | Age: 61
End: 2021-07-14

## 2021-07-14 NOTE — TELEPHONE ENCOUNTER
I called pt assistance and I think I ordered the med but need to confirm and could not stay on hold at the time of the call

## 2021-07-14 NOTE — TELEPHONE ENCOUNTER
Pt called for refill of her Linzess  She is on her last bottle  Say's that she uses a pt assistance program and that the meds are shipped to our office  Please send in for 3 month supply   # [phone]

## 2021-07-15 ENCOUNTER — HOSPITAL ENCOUNTER (OUTPATIENT)
Dept: RADIOLOGY | Facility: HOSPITAL | Age: 61
Discharge: HOME | End: 2021-07-15
Attending: INTERNAL MEDICINE
Payer: MEDICARE

## 2021-07-15 DIAGNOSIS — U07.1 PNEUMONIA DUE TO COVID-19 VIRUS: ICD-10-CM

## 2021-07-15 DIAGNOSIS — J12.82 PNEUMONIA DUE TO COVID-19 VIRUS: ICD-10-CM

## 2021-07-15 PROCEDURE — 71046 X-RAY EXAM CHEST 2 VIEWS: CPT

## 2021-07-16 NOTE — TELEPHONE ENCOUNTER
[phone] ([employer]er[employer] is now part of [facility] [facility]) Option #2, Linzess 290 mcg once daily (3 bottles)  Or Phone#[phone] refill last placed 5/3/21,     They will process next refill 7/20/21 Order ID # [id number] Will take 7-10 days to receive after 7/20/21)   Patient informed  New prescription will be needed 9/20/21 Fax# [phone] Make sure to include Pt CJ#[id number]    Next refill date: 9/27/21 to process next refills, send rx include 3 refills     Enrollment will hxgyih00/21/21- will send re-enrollment

## 2021-07-19 DIAGNOSIS — S82.64XD CLOSED NONDISPLACED FRACTURE OF LATERAL MALLEOLUS OF RIGHT FIBULA WITH ROUTINE HEALING, SUBSEQUENT ENCOUNTER: ICD-10-CM

## 2021-07-19 RX ORDER — HYDROCODONE BITARTRATE AND ACETAMINOPHEN 5; 325 MG/1; MG/1
1 TABLET ORAL EVERY 6 HOURS PRN
Qty: 20 TABLET | Refills: 0 | Status: SHIPPED | OUTPATIENT
Start: 2021-07-19 | End: 2021-07-28 | Stop reason: SDUPTHER

## 2021-07-21 ENCOUNTER — ANNUAL EXAM (OUTPATIENT)
Dept: FAMILY MEDICINE CLINIC | Facility: HOSPITAL | Age: 61
End: 2021-07-21
Payer: MEDICARE

## 2021-07-21 ENCOUNTER — OFFICE VISIT (OUTPATIENT)
Dept: PODIATRY | Facility: CLINIC | Age: 61
End: 2021-07-21
Payer: MEDICARE

## 2021-07-21 VITALS
DIASTOLIC BLOOD PRESSURE: 90 MMHG | HEIGHT: 67 IN | HEART RATE: 84 BPM | SYSTOLIC BLOOD PRESSURE: 157 MMHG | BODY MASS INDEX: 33.43 KG/M2 | WEIGHT: 213 LBS

## 2021-07-21 VITALS
OXYGEN SATURATION: 97 % | SYSTOLIC BLOOD PRESSURE: 142 MMHG | WEIGHT: 213.6 LBS | HEIGHT: 67 IN | HEART RATE: 91 BPM | BODY MASS INDEX: 33.53 KG/M2 | DIASTOLIC BLOOD PRESSURE: 84 MMHG

## 2021-07-21 DIAGNOSIS — F31.4 BIPOLAR 1 DISORDER, DEPRESSED, SEVERE (HCC): ICD-10-CM

## 2021-07-21 DIAGNOSIS — M20.61 ACQUIRED DEFORMITIES OF TOE, RIGHT: ICD-10-CM

## 2021-07-21 DIAGNOSIS — E78.00 HYPERCHOLESTEREMIA: Chronic | ICD-10-CM

## 2021-07-21 DIAGNOSIS — Z12.4 SCREENING FOR CERVICAL CANCER: Primary | ICD-10-CM

## 2021-07-21 DIAGNOSIS — S82.64XS CLOSED NONDISPLACED FRACTURE OF LATERAL MALLEOLUS OF RIGHT FIBULA, SEQUELA: Primary | ICD-10-CM

## 2021-07-21 DIAGNOSIS — Z82.49 FAMILY HISTORY OF CARDIAC DISORDER IN FATHER: ICD-10-CM

## 2021-07-21 DIAGNOSIS — E83.39 HYPERPHOSPHATEMIA: ICD-10-CM

## 2021-07-21 DIAGNOSIS — N18.4 CKD (CHRONIC KIDNEY DISEASE) STAGE 4, GFR 15-29 ML/MIN (HCC): ICD-10-CM

## 2021-07-21 DIAGNOSIS — R79.89 ELEVATED LFTS: ICD-10-CM

## 2021-07-21 DIAGNOSIS — E23.6 PITUITARY CYST (HCC): ICD-10-CM

## 2021-07-21 PROCEDURE — 99213 OFFICE O/P EST LOW 20 MIN: CPT | Performed by: PODIATRIST

## 2021-07-21 PROCEDURE — G0101 CA SCREEN;PELVIC/BREAST EXAM: HCPCS | Performed by: INTERNAL MEDICINE

## 2021-07-21 PROCEDURE — 99214 OFFICE O/P EST MOD 30 MIN: CPT | Performed by: INTERNAL MEDICINE

## 2021-07-21 PROCEDURE — G0145 SCR C/V CYTO,THINLAYER,RESCR: HCPCS | Performed by: INTERNAL MEDICINE

## 2021-07-21 RX ORDER — ATORVASTATIN CALCIUM 40 MG/1
40 TABLET, FILM COATED ORAL DAILY
Qty: 30 TABLET | Refills: 3 | Status: SHIPPED | OUTPATIENT
Start: 2021-07-21 | End: 2021-07-21 | Stop reason: SDUPTHER

## 2021-07-21 RX ORDER — ATORVASTATIN CALCIUM 40 MG/1
40 TABLET, FILM COATED ORAL DAILY
Qty: 90 TABLET | Refills: 3 | Status: SHIPPED | OUTPATIENT
Start: 2021-07-21 | End: 2021-10-28 | Stop reason: SDUPTHER

## 2021-07-21 NOTE — PROGRESS NOTES
PATIENT:  Hudson Austin  1960       ASSESSMENT:     1  Closed nondisplaced fracture of lateral malleolus of right fibula, sequela     2  Acquired deformities of toe, right            PLAN:  1  Previous office note was reviewed  X-ray was reviewed and discussed  Patient was counseled and educated on the condition and the diagnosis  2  The diagnosis, treatment options and prognosis were discussed with the patient  3  Right ankle is stable and continue supportive care  4  Discussed options for right foot deformity including surgical correction  She would consider her option and call the office when she is ready  Taping applied to right 2nd and 3rd toes  Instructed supportive care and proper footwear  Subjective:     HPI  The patient presents for follow-up on right foot pain  Ankle pain has been better  She concerns about deformity and pain on right 1st and 2nd toe  Minimal pain on left 5th toe  The following portions of the patient's history were reviewed and updated as appropriate: allergies, current medications, past family history, past medical history, past social history, past surgical history and problem list   All pertinent labs and images were reviewed        Past Medical History  Past Medical History:   Diagnosis Date    Anxiety     Anxiety disorder     Bipolar 2 disorder ([facility] [address] )     Chronic back pain     Closed fracture of distal end of right fibula with routine healing 11/4/2020    COVID-19     in Jan 2021    CVA [facility])     noted on MRI in the past    Depression     GERD (gastroesophageal reflux disease)     Hypercholesteremia     Hypernatremia     Hypertension     Hypokalemia     Idiopathic chronic pancreatitis ([facility] [address] ) 3/20/2018    Intervertebral disc disorder with radiculopathy of lumbosacral region     resolved: 12/28/2015    Kidney disease     Panic attacks     Pericardial effusion     Radiculitis resolved: 2015    Secondary renal hyperparathyroidism ([facility] [address] )     Vitamin D deficiency        Past Surgical History  Past Surgical History:   Procedure Laterality Date    BUNIONECTOMY      Left foot     COLON SURGERY      COLONOSCOPY  2021    DILATION AND CURETTAGE OF UTERUS      INDUCED       surgically induced    W[place] ANASTOMOSIS,AV,ANY SITE Left 2019    Procedure: CREATION FISTULA ARTERIOVENOUS (AV) left wrists possible left upper;  Surgeon: Joy Motta MD;  [facility];  Service: Vascular    WV CORRJ HALLUX VALGUS W/SESMDC W/DIST Melanee Humbles Left 2019    Procedure: Abi Ro;  Surgeon: Danielle Blanco DPM;  [facility];  Service: [address] W/SESMDC W/DIST Melanee Humbles Right 8/3/2020    Procedure: Leonel Lynn;  Surgeon: Danielle Blanco DPM;  [facility];  Service: Podiatry    W[place] ERCP DX COLLECTION SPECIMEN BRUSHING/WASHING N/A 2018    Procedure: ENDOSCOPIC RETROGRADE CHOLANGIOPANCREATOGRAPHY (ERCP);   Surgeon: Chris Guerra MD;  [facility];  Service: Gastroenterology    WV LAP, SURG PROCTOPEXY N/A 2018    Procedure: ROBOTIC SIGMOID RESECTION / RECTOPEXY;  Surgeon: Mercedes Delgado MD;  [facility];  Service: Colorectal    WV SIGMOIDOSCOPY FLX DX W/COLLJ SPEC BR/WA IF PFRMD N/A 2018    Procedure: SIGMOIDOSCOPY FLEXIBLE;  Surgeon: Mercedes Delgado MD;  [facility];  Service: Colorectal    TUBAL LIGATION Bilateral 1997    US GUIDED THYROID BIOPSY  2019        Allergies:  Pollen extract    Medications:  Current Outpatient Medications   Medication Sig Dispense Refill    acetaminophen (TYLENOL) 325 mg tablet Take 650 mg by mouth every 6 (six) hours as needed for mild pain      albuterol (Ventolin HFA) 90 mcg/act inhaler Inhale 2 puffs every 6 (six) hours as needed for wheezing or shortness of breath 8 5 g 3    alendronate (Fosamax) 70 mg tablet Take 1 tablet (70 mg total) by mouth every 7 days 4 tablet 5    AMILoride 5 mg tablet Take 1 tablet by mouth twice daily 120 tablet 0    aspirin (ECOTRIN LOW STRENGTH) 81 mg EC tablet Take 1 tablet (81 mg total) by mouth daily 30 tablet 0    atorvastatin (LIPITOR) 40 mg tablet Take 1 tablet (40 mg total) by mouth daily 90 tablet 3    budesonide-formoterol (SYMBICORT) 160-4 5 mcg/act inhaler Inhale 2 puffs 2 (two) times a day Rinse mouth after use  30 6 g 3    carvedilol (COREG) 3 125 mg tablet Take 2 tablets (6 25 mg total) by mouth 2 (two) times a day with meals 180 tablet 3    clonazePAM (KlonoPIN) 0 5 mg tablet Take 1 tablet (0 5 mg total) by mouth 3 (three) times a day 270 tablet 0    fluocinonide (LIDEX) 0 05 % ointment Apply topically 2 (two) times a day 60 g 1    fluvoxaMINE (LUVOX) 100 mg tablet 3 at bedtime      HYDROcodone-acetaminophen (NORCO) 5-325 mg per tablet Take 1 tablet by mouth every 6 (six) hours as needed for painMax Daily Amount: 4 tablets 20 tablet 0    lamoTRIgine (LaMICtal) 200 MG tablet Take 400 mg by mouth daily at bedtime       naloxone (NARCAN) 4 mg/0 1 mL nasal spray Administer 1 spray into a nostril  If breathing does not return to normal or if breathing difficulty resumes after 2-3 minutes, give another dose in the other nostril using a new spray   1 each 1    omeprazole (PriLOSEC) 20 mg delayed release capsule Take 1 capsule (20 mg total) by mouth daily at bedtime 90 capsule 3    ondansetron (ZOFRAN-ODT) 4 mg disintegrating tablet DISSOLVE 1 TABLET IN MOUTH EVERY 8 HOURS AS NEEDED FOR NAUSEA AND VOMITING 180 tablet 0    Polyethylene Glycol 3350 (MIRALAX PO) Take by mouth as needed       QUEtiapine (SEROquel) 100 mg tablet Take 1 tablet by mouth daily at bedtime 1 at bedtime in addition to 400 mg tab      QUEtiapine (SEROQUEL) 300 mg tablet Take 1 tablet (300 mg total) by mouth every morning 1 in the AM     ( also takes 400 mg at bedtime) 90 tablet 3    QUEtiapine (SEROquel) 400 MG tablet 1 at bedtime     (also takes 100 mg and 300 mg tabs)      traMADol (ULTRAM) 50 mg tablet Take 2 tabs by mouth twice daily 120 tablet 0    linaCLOtide (Linzess) 290 MCG CAPS Take 1 capsule by mouth daily 90 capsule 3     No current facility-administered medications for this visit  Social History:  Social History     Socioeconomic History    Marital status:      Spouse name: None    Number of children: None    Years of education: None    Highest education level: None   Occupational History    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   Tobacco Use    Smoking status: Never Smoker    Smokeless tobacco: Never Used   Vaping Use    Vaping Use: Never used   Substance and Sexual Activity    Alcohol use: Never    Drug use: Not Currently     Types: Marijuana    Sexual activity: Not Currently   Other Topics Concern    None   Social History Narrative    Daily caffeine consumption 2-3 servings a day    Lives with family  No living will  Has dentures---no dental care  Primary language--English  Feels safe at home  Social Determinants of Health     Financial Resource Strain:     Difficulty of Paying Living Expenses:    Food Insecurity:     Worried About Running Out of Food in the Last Year:     [address] in the Last Year:    Transportation Needs: No Transportation Needs    Lack of Transportation (Medical): No    Lack of Transportation (Non-Medical): No   Physical Activity: Inactive    Days of Exercise per Week: 0 days    Minutes of Exercise per Session: 0 min   Stress: Stress Concern Present    Feeling of Stress :  To some extent   Social Connections:     Frequency of Communication with Friends and Family:     Frequency of Social Gatherings with Friends and Family:     Attends Nondenominational Services:     Active Member of Clubs or Organizations:     Attends Club or Organization Meetings:     Marital Status:    Intimate Partner Violence: Not At Risk    Fear of Current or Ex-Partner: No    Emotionally Abused: No    Physically Abused: No    Sexually Abused: No          Review of Systems   Constitutional: Negative for chills and fever  HENT: Negative for sore throat  Respiratory: Negative  Cardiovascular: Negative  Gastrointestinal: Negative  Neurological: Negative for weakness and numbness  Objective:      /90   Pulse 84   Ht 5' 7" (1 702 m) Comment: verbal  Wt 96 6 kg (213 lb)   LMP  (LMP Unknown)   BMI 33 36 kg/m²          Physical Exam  Vitals reviewed  Constitutional:       General: She is not in acute distress  Appearance: She is not toxic-appearing or diaphoretic  Cardiovascular:      Rate and Rhythm: Normal rate and regular rhythm  Pulses:           Dorsalis pedis pulses are 2+ on the right side and 2+ on the left side  Posterior tibial pulses are 1+ on the right side and 1+ on the left side  Comments: No cyanosis  CRT WNL all toes  Pulmonary:      Effort: Pulmonary effort is normal  No respiratory distress  Musculoskeletal:         General: Tenderness and deformity present  Normal range of motion  Right lower leg: No edema  Left lower leg: No edema  Right foot: No foot drop  Left foot: No foot drop  Comments: Lateral angulation of bilateral great toe  Medial deviation of right 2nd toe  [place]ammertoe right 2nd toe  No acute edema  Ankle ROM intact right  No ankle instability right  Mild edema left 5th toe  Skin:     General: Skin is warm and dry  Capillary Refill: Capillary refill takes less than 2 seconds  Coloration: Skin is not cyanotic or mottled  Findings: No abscess, petechiae, rash or wound  Nails: There is no clubbing  Neurological:      General: No focal deficit present  Mental Status: She is alert and oriented to person, place, and time  Cranial Nerves: No cranial nerve deficit  Sensory: No sensory deficit  Motor: No weakness  Coordination: Coordination normal    Psychiatric:         Mood and Affect: Mood normal          Behavior: Behavior normal          Thought Content:  Thought content normal          Judgment: Judgment normal

## 2021-07-21 NOTE — PROGRESS NOTES
Assessment/Plan:             Problem List Items Addressed This Visit        Genitourinary    CKD (chronic kidney disease) stage 4, GFR 15-29 ml/min (HCC)    Relevant Orders    CBC and differential    Comprehensive metabolic panel       Other    Hypercholesteremia (Chronic)     We had sent  in order to [facility] in ealry June for atorvastatin but  Never got it  - will have her call [facility]  Will repeat level in 3 month- endo fo october         Relevant Medications    atorvastatin (LIPITOR) 40 mg tablet    Other Relevant Orders    CBC and differential    Lipid Panel with Direct LDL reflex    Elevated LFTs    Relevant Medications    atorvastatin (LIPITOR) 40 mg tablet    Bipolar 1 disorder, depressed, severe (HCC)    Hyperphosphatemia    Family history of cardiac disorder in father      Other Visit Diagnoses     Screening for cervical cancer    -  Primary    Relevant Orders    Liquid-based pap, screening            Subjective:      Patient ID: Yudelka Gómez is a 63 y o  female    Here for pap- last over 10 years ago in [place]- her doctor [place] state- youngest child is 32- had tubal ligation   had menopause after pitutary cyst in 44's and stopped periods- was on meds- no mri done in years of brain      The following portions of the patient's history were reviewed and updated as appropriate: allergies, current medications and problem list      Review of Systems   Neurological: Positive for headaches  Negative for dizziness  Occasional headaches   All other systems reviewed and are negative          Objective:      Current Outpatient Medications:     acetaminophen (TYLENOL) 325 mg tablet, Take 650 mg by mouth every 6 (six) hours as needed for mild pain, Disp: , Rfl:     albuterol (Ventolin HFA) 90 mcg/act inhaler, Inhale 2 puffs every 6 (six) hours as needed for wheezing or shortness of breath, Disp: 8 5 g, Rfl: 3    alendronate (Fosamax) 70 mg tablet, Take 1 tablet (70 mg total) by mouth every 7 days, Disp: 4 tablet, Rfl: 5    AMILoride 5 mg tablet, Take 1 tablet by mouth twice daily, Disp: 120 tablet, Rfl: 0    aspirin (ECOTRIN LOW STRENGTH) 81 mg EC tablet, Take 1 tablet (81 mg total) by mouth daily, Disp: 30 tablet, Rfl: 0    atorvastatin (LIPITOR) 40 mg tablet, Take 1 tablet (40 mg total) by mouth daily, Disp: 30 tablet, Rfl: 3    budesonide-formoterol (SYMBICORT) 160-4 5 mcg/act inhaler, Inhale 2 puffs 2 (two) times a day Rinse mouth after use , Disp: 30 6 g, Rfl: 3    carvedilol (COREG) 3 125 mg tablet, Take 2 tablets (6 25 mg total) by mouth 2 (two) times a day with meals, Disp: 180 tablet, Rfl: 3    clonazePAM (KlonoPIN) 0 5 mg tablet, Take 1 tablet (0 5 mg total) by mouth 3 (three) times a day, Disp: 270 tablet, Rfl: 0    fluocinonide (LIDEX) 0 05 % ointment, Apply topically 2 (two) times a day, Disp: 60 g, Rfl: 1    fluvoxaMINE (LUVOX) 100 mg tablet, 3 at bedtime, Disp: , Rfl:     HYDROcodone-acetaminophen (NORCO) 5-325 mg per tablet, Take 1 tablet by mouth every 6 (six) hours as needed for painMax Daily Amount: 4 tablets, Disp: 20 tablet, Rfl: 0    lamoTRIgine (LaMICtal) 200 MG tablet, Take 400 mg by mouth daily at bedtime , Disp: , Rfl:     naloxone (NARCAN) 4 mg/0 1 mL nasal spray, Administer 1 spray into a nostril   If breathing does not return to normal or if breathing difficulty resumes after 2-3 minutes, give another dose in the other nostril using a new spray , Disp: 1 each, Rfl: 1    omeprazole (PriLOSEC) 20 mg delayed release capsule, Take 1 capsule (20 mg total) by mouth daily at bedtime, Disp: 90 capsule, Rfl: 3    ondansetron (ZOFRAN-ODT) 4 mg disintegrating tablet, DISSOLVE 1 TABLET IN MOUTH EVERY 8 HOURS AS NEEDED FOR NAUSEA AND VOMITING, Disp: 180 tablet, Rfl: 0    Polyethylene Glycol 3350 (MIRALAX PO), Take by mouth as needed , Disp: , Rfl:     QUEtiapine (SEROquel) 100 mg tablet, Take 1 tablet by mouth daily at bedtime 1 at bedtime in addition to 400 mg tab, Disp: , Rfl:     QUEtiapine (SEROQUEL) 300 mg tablet, Take 1 tablet (300 mg total) by mouth every morning 1 in the AM     ( also takes 400 mg at bedtime), Disp: 90 tablet, Rfl: 3    QUEtiapine (SEROquel) 400 MG tablet, 1 at bedtime     (also takes 100 mg and 300 mg tabs), Disp: , Rfl:     traMADol (ULTRAM) 50 mg tablet, Take 2 tabs by mouth twice daily, Disp: 120 tablet, Rfl: 0    linaCLOtide (Linzess) 290 MCG CAPS, Take 1 capsule by mouth daily, Disp: 90 capsule, Rfl: 3    Blood pressure 142/84, pulse 91, height 5' 7" (1 702 m), weight 96 9 kg (213 lb 9 6 oz), SpO2 97 %, not currently breastfeeding  Physical Exam  Vitals and nursing note reviewed  Constitutional:       General: She is not in acute distress  Appearance: She is well-developed  HENT:      Head: Normocephalic and atraumatic  Nose: Congestion present  Eyes:      Conjunctiva/sclera: Conjunctivae normal    Cardiovascular:      Rate and Rhythm: Normal rate and regular rhythm  Heart sounds: No murmur heard  No gallop  Pulmonary:      Effort: Pulmonary effort is normal  No respiratory distress  Breath sounds: Normal breath sounds  No rhonchi  Chest:      Chest wall: No tenderness  Abdominal:      Palpations: Abdomen is soft  Tenderness: There is no abdominal tenderness  Genitourinary:     General: Normal vulva  Vagina: No vaginal discharge  Rectum: Normal  Guaiac result negative  Comments: Slight spotting after pap- retroverted uterus  Musculoskeletal:      Cervical back: Neck supple  Skin:     General: Skin is warm and dry  Neurological:      General: No focal deficit present  Mental Status: She is alert and oriented to person, place, and time  Psychiatric:         Mood and Affect: Mood normal          Thought Content:  Thought content normal          Judgment: Judgment normal       breast- no masses or discharge- no axillary lymphadenopathy

## 2021-07-21 NOTE — TELEPHONE ENCOUNTER
Pt left [facility] stating she got a letter saying practitioner signature and date are missing; their fax # [phone]; asks to send so she can get Linzess

## 2021-07-21 NOTE — PROGRESS NOTES
Subjective     Monico Castillo is a 63 y o  woman who comes in today for a  pap smear only  Her most recent annual exam was on greater than 10 years- had seen Dr Estela Rogers in [place]- youngest is 32       had tubal ligation in past- no spotting- had pituitary cyst and had menopausal in 38 's from that  Has occasional hot flashes  Sister recently breast cancer dx   The following portions of the patient's history were reviewed and updated as appropriate: allergies, current medications, past family history, past medical history, past social history, past surgical history and problem list     Review of Systems  Ears, nose, mouth, throat, and face: positive for nasal congestion  Objective     /84   Pulse 91   Ht 5' 7" (1 702 m)   Wt 96 9 kg (213 lb 9 6 oz)   LMP  (LMP Unknown)   SpO2 97%   BMI 33 45 kg/m²   Pelvic Exam: external genitalia normal, vagina normal without discharge and cervix normal in appearance  Pap smear obtained  Assessment/Plan   Assessment/Plan:             Problem List Items Addressed This Visit        Genitourinary    CKD (chronic kidney disease) stage 4, GFR 15-29 ml/min ([facility])    Relevant Orders    CBC and differential    Comprehensive metabolic panel       Other    Hypercholesteremia (Chronic)     We had sent  in order to [facility] in ealry June for atorvastatin but  Never got it   - will have her call [facility]  Will repeat level in 3 month- endo fo october         Relevant Medications    atorvastatin (LIPITOR) 40 mg tablet    Other Relevant Orders    CBC and differential    Lipid Panel with Direct LDL reflex    Elevated LFTs    Relevant Medications    atorvastatin (LIPITOR) 40 mg tablet    Bipolar 1 disorder, depressed, severe ([facility] [address] )    Hyperphosphatemia    Family history of cardiac disorder in father      Other Visit Diagnoses     Screening for cervical cancer    -  Primary    Relevant Orders    Liquid-based pap, screening            Subjective:      Patient ID: Angus Rodriguez Yun Johnson is a 63 y o  female    HPI    The following portions of the patient's history were reviewed and updated as appropriate: allergies, current medications and problem list      Review of Systems      Objective:      Current Outpatient Medications:     acetaminophen (TYLENOL) 325 mg tablet, Take 650 mg by mouth every 6 (six) hours as needed for mild pain, Disp: , Rfl:     albuterol (Ventolin HFA) 90 mcg/act inhaler, Inhale 2 puffs every 6 (six) hours as needed for wheezing or shortness of breath, Disp: 8 5 g, Rfl: 3    alendronate (Fosamax) 70 mg tablet, Take 1 tablet (70 mg total) by mouth every 7 days, Disp: 4 tablet, Rfl: 5    AMILoride 5 mg tablet, Take 1 tablet by mouth twice daily, Disp: 120 tablet, Rfl: 0    aspirin (ECOTRIN LOW STRENGTH) 81 mg EC tablet, Take 1 tablet (81 mg total) by mouth daily, Disp: 30 tablet, Rfl: 0    atorvastatin (LIPITOR) 40 mg tablet, Take 1 tablet (40 mg total) by mouth daily, Disp: 30 tablet, Rfl: 3    budesonide-formoterol (SYMBICORT) 160-4 5 mcg/act inhaler, Inhale 2 puffs 2 (two) times a day Rinse mouth after use , Disp: 30 6 g, Rfl: 3    carvedilol (COREG) 3 125 mg tablet, Take 2 tablets (6 25 mg total) by mouth 2 (two) times a day with meals, Disp: 180 tablet, Rfl: 3    clonazePAM (KlonoPIN) 0 5 mg tablet, Take 1 tablet (0 5 mg total) by mouth 3 (three) times a day, Disp: 270 tablet, Rfl: 0    fluocinonide (LIDEX) 0 05 % ointment, Apply topically 2 (two) times a day, Disp: 60 g, Rfl: 1    fluvoxaMINE (LUVOX) 100 mg tablet, 3 at bedtime, Disp: , Rfl:     HYDROcodone-acetaminophen (NORCO) 5-325 mg per tablet, Take 1 tablet by mouth every 6 (six) hours as needed for painMax Daily Amount: 4 tablets, Disp: 20 tablet, Rfl: 0    lamoTRIgine (LaMICtal) 200 MG tablet, Take 400 mg by mouth daily at bedtime , Disp: , Rfl:     naloxone (NARCAN) 4 mg/0 1 mL nasal spray, Administer 1 spray into a nostril   If breathing does not return to normal or if breathing difficulty resumes after 2-3 minutes, give another dose in the other nostril using a new spray , Disp: 1 each, Rfl: 1    omeprazole (PriLOSEC) 20 mg delayed release capsule, Take 1 capsule (20 mg total) by mouth daily at bedtime, Disp: 90 capsule, Rfl: 3    ondansetron (ZOFRAN-ODT) 4 mg disintegrating tablet, DISSOLVE 1 TABLET IN MOUTH EVERY 8 HOURS AS NEEDED FOR NAUSEA AND VOMITING, Disp: 180 tablet, Rfl: 0    Polyethylene Glycol 3350 (MIRALAX PO), Take by mouth as needed , Disp: , Rfl:     QUEtiapine (SEROquel) 100 mg tablet, Take 1 tablet by mouth daily at bedtime 1 at bedtime in addition to 400 mg tab, Disp: , Rfl:     QUEtiapine (SEROQUEL) 300 mg tablet, Take 1 tablet (300 mg total) by mouth every morning 1 in the AM     ( also takes 400 mg at bedtime), Disp: 90 tablet, Rfl: 3    QUEtiapine (SEROquel) 400 MG tablet, 1 at bedtime     (also takes 100 mg and 300 mg tabs), Disp: , Rfl:     traMADol (ULTRAM) 50 mg tablet, Take 2 tabs by mouth twice daily, Disp: 120 tablet, Rfl: 0    linaCLOtide (Linzess) 290 MCG CAPS, Take 1 capsule by mouth daily, Disp: 90 capsule, Rfl: 3    Blood pressure 142/84, pulse 91, height 5' 7" (1 702 m), weight 96 9 kg (213 lb 9 6 oz), SpO2 97 %, not currently breastfeeding  Physical Exam   Screening pap smear  Follow up in 2 years, or as indicated by Pap results

## 2021-07-21 NOTE — ASSESSMENT & PLAN NOTE
We had sent  in order to [facility] in ealry June for atorvastatin but  Never got it   - will have her call [facility]  Will repeat level in 3 month- endo fo october

## 2021-07-22 NOTE — TELEPHONE ENCOUNTER
Pt states she has Pt assistance to get Linzess  I noted receipt of mssg yesterday - confirmed mssg & fax # on mssg  Pt asks for CB to [phone] to confirm when info sent

## 2021-07-22 NOTE — TELEPHONE ENCOUNTER
I contacted pt assistance program and medication is in shipping process  Per 7/20/21 tel call, pt notified 7-10 days to process  I spoke with Delores Eason and updated her  We will call her when shipment received

## 2021-07-26 ENCOUNTER — TELEPHONE (OUTPATIENT)
Dept: PODIATRY | Facility: CLINIC | Age: 61
End: 2021-07-26

## 2021-07-26 NOTE — TELEPHONE ENCOUNTER
Spencer Aponte called, she would like to schedule srugery  Please call her at [phone] to schedule

## 2021-07-28 DIAGNOSIS — S82.64XD CLOSED NONDISPLACED FRACTURE OF LATERAL MALLEOLUS OF RIGHT FIBULA WITH ROUTINE HEALING, SUBSEQUENT ENCOUNTER: ICD-10-CM

## 2021-07-28 LAB
LAB AP GYN PRIMARY INTERPRETATION: NORMAL
Lab: NORMAL

## 2021-07-28 RX ORDER — HYDROCODONE BITARTRATE AND ACETAMINOPHEN 5; 325 MG/1; MG/1
1 TABLET ORAL EVERY 12 HOURS PRN
Qty: 20 TABLET | Refills: 0 | Status: SHIPPED | OUTPATIENT
Start: 2021-07-28 | End: 2021-08-05 | Stop reason: SDUPTHER

## 2021-07-29 ENCOUNTER — TELEPHONE (OUTPATIENT)
Dept: FAMILY MEDICINE CLINIC | Facility: HOSPITAL | Age: 61
End: 2021-07-29

## 2021-07-29 DIAGNOSIS — I10 HTN (HYPERTENSION): ICD-10-CM

## 2021-07-29 RX ORDER — AMILORIDE HYDROCHLORIDE 5 MG/1
5 TABLET ORAL 2 TIMES DAILY
Qty: 120 TABLET | Refills: 0 | Status: SHIPPED | OUTPATIENT
Start: 2021-07-29 | End: 2021-09-30

## 2021-08-02 ENCOUNTER — TELEPHONE (OUTPATIENT)
Dept: PODIATRY | Facility: CLINIC | Age: 61
End: 2021-08-02

## 2021-08-02 DIAGNOSIS — F31.81 BIPOLAR 2 DISORDER (HCC): Chronic | ICD-10-CM

## 2021-08-02 NOTE — TELEPHONE ENCOUNTER
I called and left a message for Adam Aviles, asking her to call the office to schedule an appointment to schedule surgery

## 2021-08-03 RX ORDER — CLONAZEPAM 0.5 MG/1
0.5 TABLET ORAL 3 TIMES DAILY
Qty: 270 TABLET | Refills: 0 | Status: SHIPPED | OUTPATIENT
Start: 2021-08-03 | End: 2021-10-28 | Stop reason: SDUPTHER

## 2021-08-04 ENCOUNTER — TELEPHONE (OUTPATIENT)
Dept: NEPHROLOGY | Facility: CLINIC | Age: 61
End: 2021-08-04

## 2021-08-04 NOTE — TELEPHONE ENCOUNTER
I spoke to the patient and she is aware her renal function and electrolytes are all stable  There are no medication changes at this time

## 2021-08-05 DIAGNOSIS — S82.64XD CLOSED NONDISPLACED FRACTURE OF LATERAL MALLEOLUS OF RIGHT FIBULA WITH ROUTINE HEALING, SUBSEQUENT ENCOUNTER: ICD-10-CM

## 2021-08-06 ENCOUNTER — TELEPHONE (OUTPATIENT)
Dept: FAMILY MEDICINE CLINIC | Facility: HOSPITAL | Age: 61
End: 2021-08-06

## 2021-08-06 ENCOUNTER — HOSPITAL ENCOUNTER (OUTPATIENT)
Dept: RADIOLOGY | Facility: HOSPITAL | Age: 61
Discharge: HOME | End: 2021-08-06
Payer: MEDICARE

## 2021-08-06 DIAGNOSIS — S99.922A FOOT INJURY, LEFT, INITIAL ENCOUNTER: ICD-10-CM

## 2021-08-06 PROCEDURE — 73630 X-RAY EXAM OF FOOT: CPT

## 2021-08-06 RX ORDER — HYDROCODONE BITARTRATE AND ACETAMINOPHEN 5; 325 MG/1; MG/1
1 TABLET ORAL EVERY 12 HOURS PRN
Qty: 20 TABLET | Refills: 0 | Status: SHIPPED | OUTPATIENT
Start: 2021-08-06 | End: 2021-09-14

## 2021-08-06 NOTE — TELEPHONE ENCOUNTER
Patient called states she hurt her Left  foot yesterday 08/05/2021  Patient is asking if the provider could order a X-ray? ?

## 2021-08-08 ENCOUNTER — HOSPITAL ENCOUNTER (OUTPATIENT)
Dept: MRI IMAGING | Facility: HOSPITAL | Age: 61
Discharge: HOME | End: 2021-08-08
Attending: INTERNAL MEDICINE
Payer: MEDICARE

## 2021-08-08 DIAGNOSIS — E23.6 PITUITARY CYST (HCC): ICD-10-CM

## 2021-08-08 PROCEDURE — G1004 CDSM NDSC: HCPCS

## 2021-08-08 PROCEDURE — 70551 MRI BRAIN STEM W/O DYE: CPT

## 2021-08-16 DIAGNOSIS — S82.64XD CLOSED NONDISPLACED FRACTURE OF LATERAL MALLEOLUS OF RIGHT FIBULA WITH ROUTINE HEALING, SUBSEQUENT ENCOUNTER: ICD-10-CM

## 2021-08-16 RX ORDER — HYDROCODONE BITARTRATE AND ACETAMINOPHEN 5; 325 MG/1; MG/1
1 TABLET ORAL EVERY 12 HOURS PRN
Qty: 20 TABLET | Refills: 0 | Status: CANCELLED | OUTPATIENT
Start: 2021-08-16

## 2021-08-16 NOTE — TELEPHONE ENCOUNTER
Patient states that she has not had a period in 16 years  She is asking if it's possible that she went through menopause already  Patient is aware that you are out of this office this week and is ok to wait until you return for your answer

## 2021-08-17 ENCOUNTER — TELEPHONE (OUTPATIENT)
Dept: FAMILY MEDICINE CLINIC | Facility: HOSPITAL | Age: 61
End: 2021-08-17

## 2021-08-17 NOTE — TELEPHONE ENCOUNTER
LM for PT that Dr Stephanie Mendez isn't in the office and the other doctors Bhargav Kan and Dr Saad Pham) don't know her  Pt was told to make an apt with one of the other doctors in the office or see pain mgnt

## 2021-08-26 ENCOUNTER — OFFICE VISIT (OUTPATIENT)
Dept: FAMILY MEDICINE CLINIC | Facility: HOSPITAL | Age: 61
End: 2021-08-26
Payer: MEDICARE

## 2021-08-26 ENCOUNTER — LAB (OUTPATIENT)
Dept: LAB | Facility: HOSPITAL | Age: 61
End: 2021-08-26
Attending: STUDENT IN AN ORGANIZED HEALTH CARE EDUCATION/TRAINING PROGRAM
Payer: MEDICARE

## 2021-08-26 VITALS
HEART RATE: 80 BPM | SYSTOLIC BLOOD PRESSURE: 156 MMHG | DIASTOLIC BLOOD PRESSURE: 92 MMHG | TEMPERATURE: 98.7 F | BODY MASS INDEX: 34.06 KG/M2 | WEIGHT: 217 LBS | OXYGEN SATURATION: 97 % | HEIGHT: 67 IN | RESPIRATION RATE: 16 BRPM

## 2021-08-26 DIAGNOSIS — R35.0 URINARY FREQUENCY: Primary | ICD-10-CM

## 2021-08-26 DIAGNOSIS — R82.998 LEUKOCYTES IN URINE: ICD-10-CM

## 2021-08-26 DIAGNOSIS — Z87.19 HISTORY OF PANCREATITIS: ICD-10-CM

## 2021-08-26 DIAGNOSIS — R10.13 EPIGASTRIC ABDOMINAL PAIN: ICD-10-CM

## 2021-08-26 DIAGNOSIS — R35.0 URINARY FREQUENCY: ICD-10-CM

## 2021-08-26 DIAGNOSIS — R39.15 URINARY URGENCY: ICD-10-CM

## 2021-08-26 LAB
ALBUMIN SERPL BCP-MCNC: 3.7 G/DL (ref 3.5–5)
ALP SERPL-CCNC: 230 U/L (ref 46–116)
ALT SERPL W P-5'-P-CCNC: 29 U/L (ref 12–78)
AMYLASE SERPL-CCNC: 172 IU/L (ref 25–115)
ANION GAP SERPL CALCULATED.3IONS-SCNC: 6 MMOL/L (ref 4–13)
AST SERPL W P-5'-P-CCNC: 21 U/L (ref 5–45)
BASOPHILS # BLD AUTO: 0.08 THOUSANDS/ΜL (ref 0–0.1)
BASOPHILS NFR BLD AUTO: 1 % (ref 0–1)
BILIRUB SERPL-MCNC: 0.24 MG/DL (ref 0.2–1)
BILIRUB UR QL STRIP: NEGATIVE
BUN SERPL-MCNC: 44 MG/DL (ref 5–25)
CALCIUM SERPL-MCNC: 9.5 MG/DL (ref 8.3–10.1)
CHLORIDE SERPL-SCNC: 109 MMOL/L (ref 100–108)
CLARITY UR: CLEAR
CO2 SERPL-SCNC: 22 MMOL/L (ref 21–32)
COLOR UR: YELLOW
CREAT SERPL-MCNC: 2.15 MG/DL (ref 0.6–1.3)
CREAT UR-MCNC: 23.6 MG/DL
EOSINOPHIL # BLD AUTO: 0.31 THOUSAND/ΜL (ref 0–0.61)
EOSINOPHIL NFR BLD AUTO: 5 % (ref 0–6)
ERYTHROCYTE [DISTWIDTH] IN BLOOD BY AUTOMATED COUNT: 13 % (ref 11.6–15.1)
GFR SERPL CREATININE-BSD FRML MDRD: 25 ML/MIN/1.73SQ M
GLUCOSE SERPL-MCNC: 107 MG/DL (ref 65–140)
GLUCOSE UR STRIP-MCNC: NEGATIVE MG/DL
HCT VFR BLD AUTO: 38.9 % (ref 34.8–46.1)
HGB BLD-MCNC: 12.1 G/DL (ref 11.5–15.4)
HGB UR QL STRIP.AUTO: NEGATIVE
IMM GRANULOCYTES # BLD AUTO: 0.02 THOUSAND/UL (ref 0–0.2)
IMM GRANULOCYTES NFR BLD AUTO: 0 % (ref 0–2)
KETONES UR STRIP-MCNC: NEGATIVE MG/DL
LEUKOCYTE ESTERASE UR QL STRIP: NEGATIVE
LIPASE SERPL-CCNC: 382 U/L (ref 73–393)
LYMPHOCYTES # BLD AUTO: 1.07 THOUSANDS/ΜL (ref 0.6–4.47)
LYMPHOCYTES NFR BLD AUTO: 17 % (ref 14–44)
MCH RBC QN AUTO: 32.3 PG (ref 26.8–34.3)
MCHC RBC AUTO-ENTMCNC: 31.1 G/DL (ref 31.4–37.4)
MCV RBC AUTO: 104 FL (ref 82–98)
MONOCYTES # BLD AUTO: 0.53 THOUSAND/ΜL (ref 0.17–1.22)
MONOCYTES NFR BLD AUTO: 8 % (ref 4–12)
NEUTROPHILS # BLD AUTO: 4.29 THOUSANDS/ΜL (ref 1.85–7.62)
NEUTS SEG NFR BLD AUTO: 69 % (ref 43–75)
NITRITE UR QL STRIP: NEGATIVE
NRBC BLD AUTO-RTO: 0 /100 WBCS
PH UR STRIP.AUTO: 6.5 [PH]
PLATELET # BLD AUTO: 288 THOUSANDS/UL (ref 149–390)
PMV BLD AUTO: 10.7 FL (ref 8.9–12.7)
POTASSIUM SERPL-SCNC: 5.4 MMOL/L (ref 3.5–5.3)
PROT SERPL-MCNC: 7.7 G/DL (ref 6.4–8.2)
PROT UR STRIP-MCNC: NEGATIVE MG/DL
PROT UR-MCNC: 15 MG/DL
PROT/CREAT UR: 0.64 MG/G{CREAT} (ref 0–0.1)
RBC # BLD AUTO: 3.75 MILLION/UL (ref 3.81–5.12)
SL AMB  POCT GLUCOSE, UA: ABNORMAL
SL AMB LEUKOCYTE ESTERASE,UA: ABNORMAL
SL AMB POCT BILIRUBIN,UA: ABNORMAL
SL AMB POCT BLOOD,UA: ABNORMAL
SL AMB POCT CLARITY,UA: CLEAR
SL AMB POCT COLOR,UA: ABNORMAL
SL AMB POCT KETONES,UA: ABNORMAL
SL AMB POCT NITRITE,UA: ABNORMAL
SL AMB POCT PH,UA: 6
SL AMB POCT SPECIFIC GRAVITY,UA: 1
SL AMB POCT URINE PROTEIN: ABNORMAL
SL AMB POCT UROBILINOGEN: ABNORMAL
SODIUM SERPL-SCNC: 137 MMOL/L (ref 136–145)
SP GR UR STRIP.AUTO: 1.01 (ref 1–1.03)
UROBILINOGEN UR QL STRIP.AUTO: 0.2 E.U./DL
WBC # BLD AUTO: 6.3 THOUSAND/UL (ref 4.31–10.16)

## 2021-08-26 PROCEDURE — 85025 COMPLETE CBC W/AUTO DIFF WBC: CPT

## 2021-08-26 PROCEDURE — 36415 COLL VENOUS BLD VENIPUNCTURE: CPT

## 2021-08-26 PROCEDURE — 83690 ASSAY OF LIPASE: CPT

## 2021-08-26 PROCEDURE — 99213 OFFICE O/P EST LOW 20 MIN: CPT | Performed by: STUDENT IN AN ORGANIZED HEALTH CARE EDUCATION/TRAINING PROGRAM

## 2021-08-26 PROCEDURE — 87086 URINE CULTURE/COLONY COUNT: CPT | Performed by: STUDENT IN AN ORGANIZED HEALTH CARE EDUCATION/TRAINING PROGRAM

## 2021-08-26 PROCEDURE — 81003 URINALYSIS AUTO W/O SCOPE: CPT | Performed by: STUDENT IN AN ORGANIZED HEALTH CARE EDUCATION/TRAINING PROGRAM

## 2021-08-26 PROCEDURE — 81003 URINALYSIS AUTO W/O SCOPE: CPT | Performed by: INTERNAL MEDICINE

## 2021-08-26 PROCEDURE — 82570 ASSAY OF URINE CREATININE: CPT | Performed by: INTERNAL MEDICINE

## 2021-08-26 PROCEDURE — 84156 ASSAY OF PROTEIN URINE: CPT | Performed by: INTERNAL MEDICINE

## 2021-08-26 PROCEDURE — 80053 COMPREHEN METABOLIC PANEL: CPT

## 2021-08-26 PROCEDURE — 82150 ASSAY OF AMYLASE: CPT

## 2021-08-26 RX ORDER — CIPROFLOXACIN 500 MG/1
500 TABLET, FILM COATED ORAL EVERY 12 HOURS SCHEDULED
Qty: 14 TABLET | Refills: 0 | Status: SHIPPED | OUTPATIENT
Start: 2021-08-26 | End: 2021-09-02

## 2021-08-26 NOTE — PROGRESS NOTES
[facility],     Assessment/Plan:      Diagnosis ICD-10-CM Associated Orders   1  Urinary frequency  R35 0 POCT urine dip auto non-scope     Urine culture     ciprofloxacin (CIPRO) 500 mg tablet   2  Urinary urgency  R39 15 POCT urine dip auto non-scope     Urine culture     ciprofloxacin (CIPRO) 500 mg tablet   3  Leukocytes in urine  R82 998 ciprofloxacin (CIPRO) 500 mg tablet     CBC and differential   4  Epigastric abdominal pain  R10 13 CBC and differential     Lipase     Amylase     Comprehensive metabolic panel   5  History of pancreatitis  Z87 19 CBC and differential     Lipase     Amylase     Comprehensive metabolic panel      Advised the importance of taking her antibiotic today as soon she gets it  Culture sent to lab  Patient agreed to get blood work done immediately  Follow-up as needed  Discussed in detail ED precautions such as fever, chills, nausea, vomiting, worsened pain   Patient may call or return to office with any questions or concerns  ______________________________________________________________________  Subjective:     Patient ID: Zonia Johnson is a 63 y o  female  HPI   Chief Complaint   Patient presents with    Urinary Tract Infection     urg/freq, low back pain   skin burns inner thigh area, navel pain     Zonia Johnson  Is a 65-year-old female with a history of the drug-induced constipation, asthma, hypertension, hyperparathyroidism, bipolar 1, OCD, CKD stage 4, who presents today for Vaginal burning on left labia  No hx of yeast infections  Hx of kidney stone in June  Hx of pancreatitis in 2018  Feels a little similar  Not sexually active  The following portions of the patient's history were reviewed and updated as appropriate: allergies, current medications and problem list     Review of Systems   Constitutional: Positive for chills (a few times)  Negative for fever     Respiratory: Negative for cough and shortness of breath  Cardiovascular: Negative for chest pain and palpitations  Gastrointestinal: Positive for nausea (mild intermittent, using zofran)  Negative for vomiting  Genitourinary: Positive for dysuria, flank pain, frequency, pelvic pain, urgency, vaginal discharge and vaginal pain (minor)  Negative for vaginal bleeding  Skin: Negative for rash and wound  Objective:      Vitals:    08/26/21 1019   BP: 156/92   Pulse: 80   Resp: 16   Temp: 98 7 °F (37 1 °C)   SpO2: 97%      Physical Exam  Vitals reviewed  Exam conducted with a chaperone present (Nydia ASHBY )  Constitutional:       General: She is not in acute distress  Appearance: She is well-developed  She is obese  She is not ill-appearing  HENT:      Head: Normocephalic and atraumatic  Eyes:      General: No scleral icterus  Right eye: No discharge  Left eye: No discharge  Cardiovascular:      Rate and Rhythm: Normal rate and regular rhythm  Pulses: Normal pulses  Heart sounds: Normal heart sounds  No murmur heard  No friction rub  No gallop  Pulmonary:      Effort: Pulmonary effort is normal  No respiratory distress  Breath sounds: Normal breath sounds  No stridor  No wheezing or rhonchi  Abdominal:      General: Abdomen is flat  Bowel sounds are normal  There is no distension  Palpations: There is no mass  Tenderness: There is abdominal tenderness (Diffuse, worst in epigastric region,  and suprapubically)  There is no right CVA tenderness  Hernia: No hernia is present  Comments: Mild b/l cva tenderness   Genitourinary:     Exam position: Supine  Pubic Area: No rash  Labia:         Right: No rash, tenderness, lesion or injury  Left: Tenderness present  No rash, lesion or injury  Urethra: No prolapse or urethral swelling  Vagina: Normal  No signs of injury  No vaginal discharge, erythema, tenderness or bleeding     Musculoskeletal:      Cervical back: Normal range of motion  Skin:     General: Skin is warm and dry  Coloration: Skin is not pale  Findings: No erythema  Neurological:      Mental Status: She is alert and oriented to person, place, and time  Psychiatric:         Mood and Affect: Mood normal          Behavior: Behavior normal          Thought Content: Thought content normal            Portions of the record may have been created with voice recognition software  Occasional wrong word or "sound alike" substitutions may have occurred due to the inherent limitations of voice recognition software  Please review the chart carefully and recognize, using context, where substitutions/typographical errors may have occurred

## 2021-08-27 ENCOUNTER — TELEPHONE (OUTPATIENT)
Dept: FAMILY MEDICINE CLINIC | Facility: HOSPITAL | Age: 61
End: 2021-08-27

## 2021-08-27 LAB — BACTERIA UR CULT: NORMAL

## 2021-08-30 ENCOUNTER — TELEPHONE (OUTPATIENT)
Dept: FAMILY MEDICINE CLINIC | Facility: HOSPITAL | Age: 61
End: 2021-08-30

## 2021-08-31 ENCOUNTER — HOSPITAL ENCOUNTER (OUTPATIENT)
Dept: CT IMAGING | Facility: HOSPITAL | Age: 61
Discharge: HOME | End: 2021-08-31
Attending: STUDENT IN AN ORGANIZED HEALTH CARE EDUCATION/TRAINING PROGRAM
Payer: MEDICARE

## 2021-08-31 ENCOUNTER — APPOINTMENT (OUTPATIENT)
Dept: LAB | Facility: HOSPITAL | Age: 61
End: 2021-08-31
Attending: STUDENT IN AN ORGANIZED HEALTH CARE EDUCATION/TRAINING PROGRAM
Payer: MEDICARE

## 2021-08-31 ENCOUNTER — HOSPITAL ENCOUNTER (OUTPATIENT)
Dept: CT IMAGING | Facility: HOSPITAL | Age: 61
Discharge: HOME | End: 2021-08-31
Attending: STUDENT IN AN ORGANIZED HEALTH CARE EDUCATION/TRAINING PROGRAM

## 2021-08-31 DIAGNOSIS — Z87.19 HISTORY OF PANCREATITIS: ICD-10-CM

## 2021-08-31 DIAGNOSIS — R10.84 GENERALIZED ABDOMINAL PAIN: ICD-10-CM

## 2021-08-31 DIAGNOSIS — Z87.19 HISTORY OF PANCREATITIS: Primary | ICD-10-CM

## 2021-08-31 LAB
ALBUMIN SERPL BCP-MCNC: 3.9 G/DL (ref 3.5–5)
ALP SERPL-CCNC: 197 U/L (ref 46–116)
ALT SERPL W P-5'-P-CCNC: 28 U/L (ref 12–78)
AMYLASE SERPL-CCNC: 123 IU/L (ref 25–115)
ANION GAP SERPL CALCULATED.3IONS-SCNC: 8 MMOL/L (ref 4–13)
AST SERPL W P-5'-P-CCNC: 20 U/L (ref 5–45)
BILIRUB SERPL-MCNC: 0.2 MG/DL (ref 0.2–1)
BUN SERPL-MCNC: 40 MG/DL (ref 5–25)
CALCIUM SERPL-MCNC: 9.2 MG/DL (ref 8.3–10.1)
CHLORIDE SERPL-SCNC: 108 MMOL/L (ref 100–108)
CO2 SERPL-SCNC: 24 MMOL/L (ref 21–32)
CREAT SERPL-MCNC: 2.36 MG/DL (ref 0.6–1.3)
GFR SERPL CREATININE-BSD FRML MDRD: 22 ML/MIN/1.73SQ M
GLUCOSE SERPL-MCNC: 108 MG/DL (ref 65–140)
LIPASE SERPL-CCNC: 270 U/L (ref 73–393)
POTASSIUM SERPL-SCNC: 5.4 MMOL/L (ref 3.5–5.3)
PROT SERPL-MCNC: 7.8 G/DL (ref 6.4–8.2)
SODIUM SERPL-SCNC: 140 MMOL/L (ref 136–145)

## 2021-08-31 PROCEDURE — 83690 ASSAY OF LIPASE: CPT

## 2021-08-31 PROCEDURE — 80053 COMPREHEN METABOLIC PANEL: CPT

## 2021-08-31 PROCEDURE — 74176 CT ABD & PELVIS W/O CONTRAST: CPT

## 2021-08-31 PROCEDURE — 36415 COLL VENOUS BLD VENIPUNCTURE: CPT

## 2021-08-31 PROCEDURE — G1004 CDSM NDSC: HCPCS

## 2021-08-31 PROCEDURE — 82150 ASSAY OF AMYLASE: CPT

## 2021-08-31 NOTE — TELEPHONE ENCOUNTER
Dr Nicol Slater as seenher and is handling referral to gi- had ct done today   Also find out who her gyn is and have her see them - do we have last pap

## 2021-09-01 ENCOUNTER — TELEPHONE (OUTPATIENT)
Dept: NEPHROLOGY | Facility: CLINIC | Age: 61
End: 2021-09-01

## 2021-09-01 NOTE — TELEPHONE ENCOUNTER
Left voicemail for patient making her aware Dr Jame Aguila has reviewed her recent lab results  Creatinine level is stable and within her baseline  Patient has been instructed to call our office regarding any further questions

## 2021-09-01 NOTE — TELEPHONE ENCOUNTER
Patient called and would like to make Dr Yasir Mariscal aware that recent CMP shows creatinine at 2 36  Patient would like to know if this value is acceptable from a renal stand point

## 2021-09-02 ENCOUNTER — TELEPHONE (OUTPATIENT)
Dept: PAIN MEDICINE | Facility: CLINIC | Age: 61
End: 2021-09-02

## 2021-09-02 ENCOUNTER — OFFICE VISIT (OUTPATIENT)
Dept: OBGYN CLINIC | Facility: CLINIC | Age: 61
End: 2021-09-02
Payer: MEDICARE

## 2021-09-02 ENCOUNTER — APPOINTMENT (OUTPATIENT)
Dept: RADIOLOGY | Facility: CLINIC | Age: 61
End: 2021-09-02
Payer: MEDICARE

## 2021-09-02 VITALS
HEIGHT: 67 IN | WEIGHT: 217 LBS | BODY MASS INDEX: 34.06 KG/M2 | SYSTOLIC BLOOD PRESSURE: 138 MMHG | DIASTOLIC BLOOD PRESSURE: 82 MMHG

## 2021-09-02 DIAGNOSIS — M25.561 PAIN IN BOTH KNEES, UNSPECIFIED CHRONICITY: ICD-10-CM

## 2021-09-02 DIAGNOSIS — G89.29 CHRONIC PAIN OF BOTH KNEES: ICD-10-CM

## 2021-09-02 DIAGNOSIS — M21.612 BUNION OF GREAT TOE OF LEFT FOOT: ICD-10-CM

## 2021-09-02 DIAGNOSIS — M17.12 PRIMARY OSTEOARTHRITIS OF LEFT KNEE: Primary | ICD-10-CM

## 2021-09-02 DIAGNOSIS — M25.562 PAIN IN BOTH KNEES, UNSPECIFIED CHRONICITY: ICD-10-CM

## 2021-09-02 DIAGNOSIS — M17.11 PRIMARY OSTEOARTHRITIS OF RIGHT KNEE: ICD-10-CM

## 2021-09-02 DIAGNOSIS — M25.561 CHRONIC PAIN OF BOTH KNEES: ICD-10-CM

## 2021-09-02 DIAGNOSIS — M25.562 CHRONIC PAIN OF BOTH KNEES: ICD-10-CM

## 2021-09-02 PROCEDURE — 73564 X-RAY EXAM KNEE 4 OR MORE: CPT

## 2021-09-02 PROCEDURE — 99213 OFFICE O/P EST LOW 20 MIN: CPT | Performed by: ORTHOPAEDIC SURGERY

## 2021-09-02 NOTE — PROGRESS NOTES
Assessment:     1  Primary osteoarthritis of left knee    2  Primary osteoarthritis of right knee    3  Chronic pain of both knees    4  Bunion of great toe of left foot        Plan:     Problem List Items Addressed This Visit        Musculoskeletal and Integument    Primary osteoarthritis of right knee    Relevant Orders    Ambulatory referral to Physical Therapy    Brace    Primary osteoarthritis of left knee - Primary    Relevant Orders    Ambulatory referral to Physical Therapy    Brace      Other Visit Diagnoses     Chronic pain of both knees        Relevant Orders    XR knee 4+ vw right injury    XR knee 4+ vw left injury    Bunion of great toe of left foot        Relevant Orders    Ambulatory referral to Orthopedic Surgery        Findings consistent with mild bilateral knee osteoarthritis, left grade 4 patellar chondrosis  Updated imaging reviewed with patient no significant interval changes from previous images  Patient will restart physical therapy for vmo, hip, core strengthening, braces giving today for stability  She can do stationary bike, elliptical, pool for exercise, avoid high impact activities  OTC anti inflammatories, voltaren gel for pain  See patient back in 3 months  If symptoms don't improve then will refer for 2nd opinion for further treatment recommendation  All patient's questions were answered to her satisfaction  This note is created using dictation transcription  It may contain typographical errors, grammatical errors, improperly dictated words, background noise and other errors  Subjective:     Patient ID: Chin Torrez is a 63 y o  female  Chief Complaint:  63 yr old female known to the practice treating conservatively for known osteoarthritis  She had orthovisc injections in May which offered little to no relief for pain  She has also had cortisone and therapy for a month in past with no significant relief   She has fallen 3 times since last seen in office due to knee's giving out  She has a very difficult time rising from seated positions  Pain is global in both knee's worse anterior-posterior aspect  Noted instability  Pain is constant and daily and impacting her daily activities  She tries to walk for exercise for pain limits  She does have MR of left knee in chart grade 4 chondrosis of patella      Allergy:  Allergies   Allergen Reactions    Pollen Extract Nasal Congestion     Medications:  all current active meds have been reviewed  Past Medical History:  Past Medical History:   Diagnosis Date    Anxiety     Anxiety disorder     Bipolar 2 disorder ([facility] )     Chronic back pain     Closed fracture of distal end of right fibula with routine healing 2020    COVID-19     in 2021    CVA (cerebral vascular accident) [facility])     noted on MRI in the past    Depression     GERD (gastroesophageal reflux disease)     Hypercholesteremia     Hypernatremia     Hypertension     Hypokalemia     Idiopathic chronic pancreatitis ([facility] ) 3/20/2018    Intervertebral disc disorder with radiculopathy of lumbosacral region     resolved: 2015    Kidney disease     Panic attacks     Pericardial effusion     Radiculitis     resolved: 2015    Secondary renal hyperparathyroidism ([facility] )     Vitamin D deficiency      Past Surgical History:  Past Surgical History:   Procedure Laterality Date    BUNIONECTOMY      Left foot     COLON SURGERY      COLONOSCOPY  2021    DILATION AND CURETTAGE OF UTERUS      INDUCED       surgically induced    FL ANASTOMOSIS,AV,ANY SITE Left 2019    Procedure: CREATION FISTULA ARTERIOVENOUS (AV) left wrists possible left upper;  Surgeon: Luz Nicole MD;  Location:  MAIN OR;  Service: Vascular    osflaco U  97  W/SESMDC W/DIST [facility] Left 2019    Procedure: Gokul Manzanares;  Surgeon: Tomasa Joiner DPM;  Location: [facility] MAIN OR;  Service: Podiatry    FL Omid W/SESMDC W/DIST [facility] OSTEOT Right 8/3/2020    Procedure: Livier Monet;  Surgeon: Kimberly Burks DPM;  Location: [facility] MAIN OR;  Service: Podiatry    ND ERCP DX COLLECTION SPECIMEN BRUSHING/WASHING N/A 4/11/2018    Procedure: ENDOSCOPIC RETROGRADE CHOLANGIOPANCREATOGRAPHY (ERCP);   Surgeon: Daisy Pompa MD;  Location: [facility] MAIN OR;  Service: Gastroenterology    ND LAP, SURG PROCTOPEXY N/A 7/13/2018    Procedure: ROBOTIC SIGMOID RESECTION / RECTOPEXY;  Surgeon: Perlita Jimenez MD;  Location: [facility] MAIN OR;  Service: Colorectal    ND SIGMOIDOSCOPY FLX DX W/COLLJ SPEC BR/WA IF PFRMD N/A 7/13/2018    Procedure: Andreea Dave;  Surgeon: Perlita Jimenez MD;  Location: [facility] MAIN OR;  Service: Colorectal    TUBAL LIGATION Bilateral [facility] THYROID BIOPSY  7/30/2019     Family History:  Family History   Problem Relation Age of Onset    Bipolar disorder Mother     Mental illness Mother         depression    Stroke Mother     Dementia Mother     Colon polyps Mother     Heart disease Father     Hypertension Father     Diabetes Father     Other Family         Back disorder    Diabetes Family     Heart disease Family     Hypertension Family     Stroke Family     Thyroid disease Family    Mollie Sizer Breast cancer Paternal [de-identified]         age unknown   Mollie Sizer Breast cancer Paternal [de-identified]         age unknown   Mollie Sizer Breast cancer Maternal Aunt         age unknown    Mental illness Sister     Colon polyps Sister     Mental illness Sister     Heart disease Sister     No Known Problems Sister     Breast cancer Sister 76    Other Son         pituitary tumor    Hypertension Son     Obesity Son     No Known Problems Son     No Known Problems Maternal Grandmother     No Known Problems Maternal Grandfather     No Known Problems Paternal Grandfather     Breast cancer Paternal Aunt         age unknown    Substance Abuse Neg Hx         neg fam hx    Colon cancer Neg Hx      Social History:  Social History     Substance and Sexual Activity   Alcohol Use Never     Social History     Substance and Sexual Activity   Drug Use Not Currently    Types: Marijuana     Social History     Tobacco Use   Smoking Status Never Smoker   Smokeless Tobacco Never Used     Review of Systems   Constitutional: Negative for chills and fever  HENT: Negative for ear pain and sore throat  Eyes: Negative for pain and visual disturbance  Respiratory: Negative for cough and shortness of breath  Cardiovascular: Negative for chest pain and palpitations  Gastrointestinal: Negative for abdominal pain and vomiting  Genitourinary: Negative for dysuria and hematuria  Musculoskeletal: Positive for arthralgias (Bilateral knee)  Negative for back pain and joint swelling  Skin: Negative for color change and rash  Neurological: Negative for seizures and syncope  Psychiatric/Behavioral: Negative  All other systems reviewed and are negative  Objective:  BP Readings from Last 1 Encounters:   09/02/21 138/82      Wt Readings from Last 1 Encounters:   09/02/21 98 4 kg (217 lb)      BMI:   Estimated body mass index is 33 99 kg/m² as calculated from the following:    Height as of this encounter: 5' 7" (1 702 m)  Weight as of this encounter: 98 4 kg (217 lb)  BSA:   Estimated body surface area is 2 09 meters squared as calculated from the following:    Height as of this encounter: 5' 7" (1 702 m)  Weight as of this encounter: 98 4 kg (217 lb)  Physical Exam  Vitals and nursing note reviewed  Constitutional:       Appearance: Normal appearance  She is well-developed  HENT:      Head: Normocephalic and atraumatic  Right Ear: External ear normal       Left Ear: External ear normal    Eyes:      Extraocular Movements: Extraocular movements intact  Conjunctiva/sclera: Conjunctivae normal    Pulmonary:      Effort: Pulmonary effort is normal    Musculoskeletal:      Cervical back: Neck supple  Right knee: No effusion  Skin:     General: Skin is warm and dry  Neurological:      Mental Status: She is alert and oriented to person, place, and time  Deep Tendon Reflexes: Reflexes are normal and symmetric  Psychiatric:         Mood and Affect: Mood normal          Behavior: Behavior normal        Right Knee Exam     Tenderness   Right knee tenderness location: global tenderness  Range of Motion   Extension: 0 (pain)   Flexion: 130     Tests   Varus: negative Valgus: negative  Patellar apprehension: negative    Other   Erythema: absent  Scars: absent  Sensation: normal  Pulse: present  Swelling: none  Effusion: no effusion present    Comments: Well tracking patella with crepitation       Left Knee Exam     Tenderness   Left knee tenderness location: global tenderness  Range of Motion   Extension: 0 (pain)   Flexion: 120 (;pain)     Tests   Varus: negative Valgus: negative  Patellar apprehension: negative    Other   Erythema: absent  Scars: absent  Sensation: normal  Pulse: present  Swelling: none    Comments: Well tracking patella with crepitation             I have personally reviewed pertinent films in PACS and my interpretation is xr bilateral knee's demonstrate no significant interval changes from previous imaging in february  Mild degenerative changes  No soft tissue calcification  Good joint alignment       Scribe Attestation    I,:  Annabella Rae am acting as a scribe while in the presence of the attending physician :       I,:  Chadwick Crowe MD personally performed the services described in this documentation    as scribed in my presence :

## 2021-09-02 NOTE — TELEPHONE ENCOUNTER
Pt stopped in the office today and requested a consultation with René for her knee pain    René reviewed her records and doesn't think we are a good fit for her - Dilip Caraballo thinks Pt should f/u with Ortho

## 2021-09-03 ENCOUNTER — TELEPHONE (OUTPATIENT)
Dept: OBGYN CLINIC | Facility: HOSPITAL | Age: 61
End: 2021-09-03

## 2021-09-03 NOTE — TELEPHONE ENCOUNTER
Patient was sent home with 2 knee braces yesterday  She advised they are both different sizes  She would like to know if she can bring them in to exchange  Patient can be reached at [phone]

## 2021-09-07 NOTE — TELEPHONE ENCOUNTER
Patient called checking the status on previous message & I relayed René Sahu's message  Patient verbalized understanding

## 2021-09-08 ENCOUNTER — TELEPHONE (OUTPATIENT)
Dept: OBGYN CLINIC | Facility: HOSPITAL | Age: 61
End: 2021-09-08

## 2021-09-08 NOTE — TELEPHONE ENCOUNTER
We do not prescribe muscle relaxers for knee pain  It will not decrease the pain in her knee joint  PT will help with any muscle tightness by helping her stretch

## 2021-09-08 NOTE — TELEPHONE ENCOUNTER
DR Aliya Beyer  RE: Muscle relaxer    Patient asked if Dr Aliya Beyer can prescribe a muscle relaxer for her knee pain    Russ IbarraGlenanna [facility], [address] [address]   [phone]

## 2021-09-09 ENCOUNTER — PREP FOR PROCEDURE (OUTPATIENT)
Dept: OBGYN CLINIC | Facility: CLINIC | Age: 61
End: 2021-09-09

## 2021-09-09 ENCOUNTER — TELEPHONE (OUTPATIENT)
Dept: OBGYN CLINIC | Facility: HOSPITAL | Age: 61
End: 2021-09-09

## 2021-09-09 ENCOUNTER — OFFICE VISIT (OUTPATIENT)
Dept: OBGYN CLINIC | Facility: CLINIC | Age: 61
End: 2021-09-09
Payer: MEDICARE

## 2021-09-09 VITALS
WEIGHT: 217 LBS | BODY MASS INDEX: 34.06 KG/M2 | DIASTOLIC BLOOD PRESSURE: 70 MMHG | HEIGHT: 67 IN | SYSTOLIC BLOOD PRESSURE: 126 MMHG

## 2021-09-09 DIAGNOSIS — M20.11 ACQUIRED HALLUX INTERPHALANGEUS, RIGHT: Primary | ICD-10-CM

## 2021-09-09 DIAGNOSIS — Z01.818 PREOPERATIVE CLEARANCE: ICD-10-CM

## 2021-09-09 DIAGNOSIS — S99.921A INJURY OF PLANTAR PLATE, RIGHT, INITIAL ENCOUNTER: ICD-10-CM

## 2021-09-09 DIAGNOSIS — M20.5X1 CLAW TOE, ACQUIRED, RIGHT: ICD-10-CM

## 2021-09-09 DIAGNOSIS — K21.9 GASTROESOPHAGEAL REFLUX DISEASE: ICD-10-CM

## 2021-09-09 PROCEDURE — 99213 OFFICE O/P EST LOW 20 MIN: CPT | Performed by: ORTHOPAEDIC SURGERY

## 2021-09-09 RX ORDER — CEFAZOLIN SODIUM 2 G/50ML
2000 SOLUTION INTRAVENOUS ONCE
Status: CANCELLED | OUTPATIENT
Start: 2021-09-27 | End: 2021-09-09

## 2021-09-09 RX ORDER — CHLORHEXIDINE GLUCONATE 4 G/100ML
SOLUTION TOPICAL DAILY PRN
Status: CANCELLED | OUTPATIENT
Start: 2021-09-09

## 2021-09-09 RX ORDER — OMEPRAZOLE 20 MG/1
20 CAPSULE, DELAYED RELEASE ORAL
Qty: 30 CAPSULE | Refills: 3 | Status: SHIPPED | OUTPATIENT
Start: 2021-09-09 | End: 2021-09-14

## 2021-09-09 NOTE — TELEPHONE ENCOUNTER
Patient sees Dr Saira Mccauley  Patient is calling to speak with Dr Saira Mccauley directly  She has some questions to go over before scheduling with Dr Yumiko Stanton        CB: [phone]

## 2021-09-09 NOTE — H&P (VIEW-ONLY)
KATINA Dumont  Attending, Orthopaedic Surgery  [facility] [address] [facility]        ORTHOPAEDIC FOOT AND ANKLE CLINIC VISIT     Assessment:     Encounter Diagnoses   Name Primary?  Acquired hallux interphalangeus, right Yes    Claw toe, acquired, right     Injury of plantar plate, right, initial encounter     Preoperative clearance               Plan:   · The patient verbalized understanding of exam findings and treatment plan  We engaged in the shared decision-making process and treatment options were discussed at length with the patient  Surgical and conservative management discussed today along with risks and benefits  · She has hallux interphalangeus with crossover of the great toe over the 2nd toe  She also has a 2nd claw toe with clinical evidence of plantar plate rupture  · We can offer her an akin osteotomy to correct hallux interphalangeus and a 2nd claw toe correction with possible plantar plate repair  · Patient wound like to proceed with surgery  This procedure has been fully reviewed with the patient and written informed consent has been obtained  · Order given for darco heel weight bearing boot  Patient will  the boot prior to surgery and bring it with her the day of surgery  Return in about 3 weeks (around 9/30/2021) for post-op visit   CONSENT FOR BONY PROCEDURES:   Patient understands that there is no guarantee that the surgery will relieve all of Her pain and also understands that there may be a prolonged course of protected weight-bearing status required which will restrict them from driving and other activities as discussed at today's visit  Patient recognizes that there are risks with surgery including bleeding, numbness, nerve irritation, wound complications, infection, continued pain, joint stiffness, malunion, nonunion, anesthetic complications, death, failure of procedure and possible need for further surgery   The patient understands that there is no guarantee that this surgery will relieve all of Her pain and symptoms  Patient understands that there is no guarantee that they will return to full function after the procedure  Patient has provided informed consent for the procedure  History of Present Illness:   Chief Complaint:   Chief Complaint   Patient presents with   [address] is a 63 y o  female who is being seen for right foot pain  Patient had a right bunion correction by Dr Jayde Alarcon and she has had crossover of the great toe over the 2nd toe  She complains of her great toe rubbing on shoes  Pain is localized at 2nd toe due to friction from the great toe with minimal radiating and described as sharp and severe  Patient denies numbness, tingling or radicular pain  Denies history of neuropathy  Patient does not smoke, does not have diabetes and does not take blood thinners  Patient denies family history of anesthesia complications and has not had any complications with anesthesia       Pain/symptom timing:  Worse during the day when active  Pain/symptom context:  Worse with activites and work  Pain/symptom modifying factors:  Rest makes better, activities make worse  Pain/symptom associated signs/symptoms: none    Prior treatment   · NSAIDsNo    · Injections No   · Bracing/Orthotics Yes - toe sleeve and toe spacer without relief  · Physical Therapy No     Orthopedic Surgical History:   Right Sha osteotomy bunion correction on 8/3/20 by Yordy Chatterjee DPM    Past Medical, Surgical and Social History:  Past Medical History:  has a past medical history of Anxiety, Anxiety disorder, Bipolar 2 disorder ([address] ), Chronic back pain, Closed fracture of distal end of right fibula with routine healing (11/4/2020), COVID-19, CVA (cerebral vascular accident) ([address] ), Depression, GERD (gastroesophageal reflux disease), Hypercholesteremia, Hypernatremia, Hypertension, Hypokalemia, Idiopathic chronic pancreatitis ([facility] ) (3/20/2018), Intervertebral disc disorder with radiculopathy of lumbosacral region, Kidney disease, Panic attacks, Pericardial effusion, Radiculitis, Secondary renal hyperparathyroidism ([facility] ), and Vitamin D deficiency  Problem List: does not have any pertinent problems on file  Past Surgical History:  has a past surgical history that includes Tubal ligation (Bilateral, ); Dilation and curettage of uterus; Induced ; pr ercp dx collection specimen brushing/washing (N/A, 2018); pr lap, surg proctopexy (N/A, 2018); pr sigmoidoscopy flx dx w/collj spec br/wa if pfrmd (N/A, 2018); Colonoscopy (2021); Colon surgery; pr corrj hallux valgus w/sesmdc w/dist metar osteot (Left, 2019); US guided thyroid biopsy (2019); Bunionectomy; pr anastomosis,av,any site (Left, 2019); and pr corrj hallux valgus w/sesmdc w/dist metar osteot (Right, 8/3/2020)  Family History: family history includes Bipolar disorder in her mother; Breast cancer in her maternal aunt, paternal aunt, paternal aunt, and paternal grandmother; Breast cancer (age of onset: 76) in her sister; Colon polyps in her mother and sister; Dementia in her mother; Diabetes in her family and father; Heart disease in her family, father, and sister; Hypertension in her family, father, and son; Mental illness in her mother, sister, and sister; No Known Problems in her maternal grandfather, maternal grandmother, paternal grandfather, sister, and son; Obesity in her son; Other in her family and son; Stroke in her family and mother; Thyroid disease in her family  Social History:  reports that she has never smoked  She has never used smokeless tobacco  She reports previous drug use  Drug: Marijuana  She reports that she does not drink alcohol    Current Medications: has a current medication list which includes the following prescription(s): acetaminophen, albuterol, alendronate, amiloride, aspirin, atorvastatin, budesonide-formoterol, carvedilol, clonazepam, fluocinonide, fluvoxamine, hydrocodone-acetaminophen, lamotrigine, naloxone, omeprazole, ondansetron, polyethylene glycol 3350, quetiapine, quetiapine, quetiapine, tramadol, and linzess  Allergies: is allergic to pollen extract  Review of Systems:  General- denies fever/chills  HEENT- denies hearing loss or sore throat  Eyes- denies eye pain or visual disturbances, denies red eyes  Respiratory- denies cough or SOB  Cardio- denies chest pain or palpitations  GI- denies abdominal pain  Endocrine- denies urinary frequency  Urinary- denies pain with urination  Musculoskeletal- Negative except noted above  Skin- denies rashes or wounds  Neurological- denies dizziness or headache  Psychiatric- denies anxiety or difficulty concentrating    Physical Exam:   /70   Ht 5' 7" (1 702 m)   Wt 98 4 kg (217 lb)   LMP  (LMP Unknown)   BMI 33 99 kg/m²   General/Constitutional: No apparent distress: well-nourished and well developed  Eyes: normal ocular motion  Cardio: RRR, Normal S1S2, No m/r/g  Lymphatic: No appreciable lymphadenopathy  Respiratory: Non-labored breathing, CTA b/l no w/c/r  Vascular: No edema, swelling or tenderness, except as noted in detailed exam   Integumentary: No impressive skin lesions present, except as noted in detailed exam   Neuro: No ataxia or tremors noted  Psych: Normal mood and affect, oriented to person, place and time  Appropriate affect  Musculoskeletal: Normal, except as noted in detailed exam and in HPI  Examination    Right    Gait Normal   Musculoskeletal Tender to palpation at plantar 2nd MTP joint  Positive anterior drawer of 2nd MTP joint with pain, consistent with plantar plate rupture    Skin Superficial irritation on 2nd toe from friction of the great crossover toe      Nails Normal    Range of Motion  1st MTP 50 degrees dorsiflexion, 20 degrees plantarflexion  Subtalar motion: normal    Stability Stable    Muscle Strength 5/5 tibialis anterior  5/5 gastrocnemius-soleus  5/5 posterior tibialis  5/5 peroneal/eversion strength  5/5 EHL  5/5 FHL    Neurologic Normal    Sensation  Intact to light touch throughout sural, saphenous, superficial peroneal, deep peroneal and medial/lateral plantar nerve distributions  [place]-Sheldon 5 07 filament (10g) testing  deferred  Cardiovascular Brisk capillary refill < 2 seconds,intact DP and PT pulses    Special Tests None      Imaging Studies:   3 views of the right foot were taken, reviewed and interpreted independently that demonstrate hardware is in the expected position and bunion correction osteotomy is well healed  Hallux interphalangeus noted    Reviewed by me personally  Scribe Attestation    I,:  Alfonso Steinberg PA-C am acting as a scribe while in the presence of the attending physician :       I,:  Ana Maria Trammell MD personally performed the services described in this documentation    as scribed in my presence :             Jenise Feeler Lachman, MD  Foot & Ankle Surgery   Department of [address] [place] [address]      I personally performed the service  Jenise Feeler Lachman, MD

## 2021-09-09 NOTE — PROGRESS NOTES
KATINA Camacho  Attending, Orthopaedic Surgery  [facility] [address] [facility]        ORTHOPAEDIC FOOT AND ANKLE CLINIC VISIT     Assessment:     Encounter Diagnoses   Name Primary?  Acquired hallux interphalangeus, right Yes    Claw toe, acquired, right     Injury of plantar plate, right, initial encounter     Preoperative clearance               Plan:   · The patient verbalized understanding of exam findings and treatment plan  We engaged in the shared decision-making process and treatment options were discussed at length with the patient  Surgical and conservative management discussed today along with risks and benefits  · She has hallux interphalangeus with crossover of the great toe over the 2nd toe  She also has a 2nd claw toe with clinical evidence of plantar plate rupture  · We can offer her an akin osteotomy to correct hallux interphalangeus and a 2nd claw toe correction with possible plantar plate repair  · Patient wound like to proceed with surgery  This procedure has been fully reviewed with the patient and written informed consent has been obtained  · Order given for darco heel weight bearing boot  Patient will  the boot prior to surgery and bring it with her the day of surgery  Return in about 3 weeks (around 9/30/2021) for post-op visit   CONSENT FOR BONY PROCEDURES:   Patient understands that there is no guarantee that the surgery will relieve all of Her pain and also understands that there may be a prolonged course of protected weight-bearing status required which will restrict them from driving and other activities as discussed at today's visit  Patient recognizes that there are risks with surgery including bleeding, numbness, nerve irritation, wound complications, infection, continued pain, joint stiffness, malunion, nonunion, anesthetic complications, death, failure of procedure and possible need for further surgery   The patient understands that there is no guarantee that this surgery will relieve all of Her pain and symptoms  Patient understands that there is no guarantee that they will return to full function after the procedure  Patient has provided informed consent for the procedure  History of Present Illness:   Chief Complaint:   Chief Complaint   Patient presents with   [address] is a 63 y o  female who is being seen for right foot pain  Patient had a right bunion correction by Dr Jim Mack and she has had crossover of the great toe over the 2nd toe  She complains of her great toe rubbing on shoes  Pain is localized at 2nd toe due to friction from the great toe with minimal radiating and described as sharp and severe  Patient denies numbness, tingling or radicular pain  Denies history of neuropathy  Patient does not smoke, does not have diabetes and does not take blood thinners  Patient denies family history of anesthesia complications and has not had any complications with anesthesia       Pain/symptom timing:  Worse during the day when active  Pain/symptom context:  Worse with activites and work  Pain/symptom modifying factors:  Rest makes better, activities make worse  Pain/symptom associated signs/symptoms: none    Prior treatment   · NSAIDsNo    · Injections No   · Bracing/Orthotics Yes - toe sleeve and toe spacer without relief  · Physical Therapy No     Orthopedic Surgical History:   Right Sha osteotomy bunion correction on 8/3/20 by Jake Gaxiola DPM    Past Medical, Surgical and Social History:  Past Medical History:  has a past medical history of Anxiety, Anxiety disorder, Bipolar 2 disorder ([address] ), Chronic back pain, Closed fracture of distal end of right fibula with routine healing (11/4/2020), COVID-19, CVA (cerebral vascular accident) ([address] ), Depression, GERD (gastroesophageal reflux disease), Hypercholesteremia, Hypernatremia, Hypertension, Hypokalemia, Idiopathic chronic pancreatitis ([facility] ) (3/20/2018), Intervertebral disc disorder with radiculopathy of lumbosacral region, Kidney disease, Panic attacks, Pericardial effusion, Radiculitis, Secondary renal hyperparathyroidism ([facility] ), and Vitamin D deficiency  Problem List: does not have any pertinent problems on file  Past Surgical History:  has a past surgical history that includes Tubal ligation (Bilateral, ); Dilation and curettage of uterus; Induced ; pr ercp dx collection specimen brushing/washing (N/A, 2018); pr lap, surg proctopexy (N/A, 2018); pr sigmoidoscopy flx dx w/collj spec br/wa if pfrmd (N/A, 2018); Colonoscopy (2021); Colon surgery; pr corrj hallux valgus w/sesmdc w/dist metar osteot (Left, 2019); US guided thyroid biopsy (2019); Bunionectomy; pr anastomosis,av,any site (Left, 2019); and pr corrj hallux valgus w/sesmdc w/dist metar osteot (Right, 8/3/2020)  Family History: family history includes Bipolar disorder in her mother; Breast cancer in her maternal aunt, paternal aunt, paternal aunt, and paternal grandmother; Breast cancer (age of onset: 76) in her sister; Colon polyps in her mother and sister; Dementia in her mother; Diabetes in her family and father; Heart disease in her family, father, and sister; Hypertension in her family, father, and son; Mental illness in her mother, sister, and sister; No Known Problems in her maternal grandfather, maternal grandmother, paternal grandfather, sister, and son; Obesity in her son; Other in her family and son; Stroke in her family and mother; Thyroid disease in her family  Social History:  reports that she has never smoked  She has never used smokeless tobacco  She reports previous drug use  Drug: Marijuana  She reports that she does not drink alcohol    Current Medications: has a current medication list which includes the following prescription(s): acetaminophen, albuterol, alendronate, amiloride, aspirin, atorvastatin, budesonide-formoterol, carvedilol, clonazepam, fluocinonide, fluvoxamine, hydrocodone-acetaminophen, lamotrigine, naloxone, omeprazole, ondansetron, polyethylene glycol 3350, quetiapine, quetiapine, quetiapine, tramadol, and linzess  Allergies: is allergic to pollen extract  Review of Systems:  General- denies fever/chills  HEENT- denies hearing loss or sore throat  Eyes- denies eye pain or visual disturbances, denies red eyes  Respiratory- denies cough or SOB  Cardio- denies chest pain or palpitations  GI- denies abdominal pain  Endocrine- denies urinary frequency  Urinary- denies pain with urination  Musculoskeletal- Negative except noted above  Skin- denies rashes or wounds  Neurological- denies dizziness or headache  Psychiatric- denies anxiety or difficulty concentrating    Physical Exam:   /70   Ht 5' 7" (1 702 m)   Wt 98 4 kg (217 lb)   LMP  (LMP Unknown)   BMI 33 99 kg/m²   General/Constitutional: No apparent distress: well-nourished and well developed  Eyes: normal ocular motion  Cardio: RRR, Normal S1S2, No m/r/g  Lymphatic: No appreciable lymphadenopathy  Respiratory: Non-labored breathing, CTA b/l no w/c/r  Vascular: No edema, swelling or tenderness, except as noted in detailed exam   Integumentary: No impressive skin lesions present, except as noted in detailed exam   Neuro: No ataxia or tremors noted  Psych: Normal mood and affect, oriented to person, place and time  Appropriate affect  Musculoskeletal: Normal, except as noted in detailed exam and in HPI  Examination    Right    Gait Normal   Musculoskeletal Tender to palpation at plantar 2nd MTP joint  Positive anterior drawer of 2nd MTP joint with pain, consistent with plantar plate rupture    Skin Superficial irritation on 2nd toe from friction of the great crossover toe      Nails Normal    Range of Motion  1st MTP 50 degrees dorsiflexion, 20 degrees plantarflexion  Subtalar motion: normal    Stability Stable    Muscle Strength 5/5 tibialis anterior  5/5 gastrocnemius-soleus  5/5 posterior tibialis  5/5 peroneal/eversion strength  5/5 EHL  5/5 FHL    Neurologic Normal    Sensation  Intact to light touch throughout sural, saphenous, superficial peroneal, deep peroneal and medial/lateral plantar nerve distributions  [place]-Sheldon 5 07 filament (10g) testing  deferred  Cardiovascular Brisk capillary refill < 2 seconds,intact DP and PT pulses    Special Tests None      Imaging Studies:   3 views of the right foot were taken, reviewed and interpreted independently that demonstrate hardware is in the expected position and bunion correction osteotomy is well healed  Hallux interphalangeus noted    Reviewed by me personally  Scribe Attestation    I,:  Milan Bernabe PA-C am acting as a scribe while in the presence of the attending physician :       I,:  Rasheeda Montes MD personally performed the services described in this documentation    as scribed in my presence :             Betty Patten Lachman, MD  Foot & Ankle Surgery   Department of [address] [place] [address]      I personally performed the service  Betty Patten Lachman, MD

## 2021-09-09 NOTE — PATIENT INSTRUCTIONS
KATINA Silva  Attending, [address] [place] [address] Office Phone: [phone] ? Fax: [phone]  [facility] Office Phone: [phone] ? QKE:[phone]    : Mitchell Romero) [place], [place]     Surgery Coordinators Saba Berry: Kalli [facility], [address] [facility], [phone]  Surgery Coordinator James:  Cherie Briones [address], [phone]  www [email or web address]   PRE-OPERATIVE AND POST-OPERATIVE INSTRUCTIONS    General Information:   Your surgery is with Dr Ewelina Sanchez  Dates can change (although rare) depending on emergencies   Typical post operative visits are at the following intervals:  3 weeks post surgery(except 1 week for bunions and wound monitoring), 6 weeks post surgery, 3 months post surgery, 6 months post surgery, and then on a yearly basis  However, this may change based on Dr Sameer Ag recommendation   #1 post-operative rule for foot/ankle surgery:  ONCE YOU ARE OUT OF YOUR CAST AND/OR REMOVABLE BOOT, SWELLING MAY PERSIST FOR MANY MONTHS  YOU MIGHT ALSO EXPERIENCE A BLUISH DISCOLORATION OF YOUR LEG  THIS IS NORMAL AND PART OF THE USUAL POSTOPERATIVE EXPERIENCE  SMOKING:   Smoking results in incomplete healing of fractures (broken bones) and joints that my have been fused  Smoking and nicotine also prevents the growth of bone into ankle replacements and bone healing  It also slows the healing of muscles and skin (soft tissue)  Therefore, please do not have surgery if you continue to smoke  We reserve the right to cancel your surgery if we suspect that you are smoking  DO NOT use nicorette gum or other patches  Please find an alternative method to quit smoking before your surgery  Pre-Operative Information:   Surgery date and preoperative visits:  a   If you have medical problems, such as an abnormal EKG, history of BLOOD CLOT, ANEURYSM, and any other heart condition, please inform us so that we can get your medical clearance several weeks before the surgery  Please bring any important medical information, such as an EKG, chest x-ray, or echocardiogram, with you to ensure that your surgery will not be delayed  b  If needed, you will receive your preoperative appointments in the mail or by phone from our scheduling office  The location of the preoperative appointment will be given to you also   c  You may not eat after midnight the night before surgery  If you do, your surgery will be cancelled  d   Kinga Pricechemo will receive a phone call from your surgery center the day before your surgery (if your surgery is on a Monday, you will get a call the Friday before)  If you do not hear from someone by 4pm the day before your surgery, please call the Surgical coordinator (number above) to notify us   e  Start taking Vitamin D3 4000 units per day and Calcium 1200mg per day immediately  You will continue this until your 3 month post-op visit  These are over the counter and available at all pharmacies and supermarkets  f  FOR THOSE HAVING SURGERY AT [address] [place] [address] WILL NEED CRUTCHES OR A ROLLING WALKER AFTER SURGERY, ASK FOR A PRESCRIPTION FOR THIS FROM OUR OFFICE TODAY  THIS CANNOT BE HANDLED THE DAY OF SURGERY AS [facility] DOES NOT STOCK THESE   Because bacterial can often enter any defect in the skin, it is important to avoid any cuts before surgery  Any breaks in the skin on the leg will often result in your surgery being postponed  Please avoid going on a very long walk the day prior to surgery, or doing other activities that could lead to irritation of the skin, including yard work, extra athletic activity, or shaving  This could result in surgery cancellation   You MUST be fasting the day of your surgery    Therefore, please do not consume any foot or beverage after midnight the night before surgery  The morning of surgery you may take your usual medications with a sip of water   It is important not to take anti-inflammatory medication like Ibuprofen, Motrin, Naproxen (Aleve), or Aspirin 7-10 days before surgery because they will make you bleed more than usual   Vitamin, E, Plavix and Coumadin also have the same effect  Stop Aspirin and Vitamin E two weeks before surgery  YOUR MEDICAL DOCTOR SHOULD TELL YOU WHEN TO STOP COUMADIN OR PLAVIX   If your surgery involves any bone healing, please do not take anti-inflammatories for at least 6 weeks after surgery  This can impede bone healing (ibuprofen, Aleve, Relafen, iodine)  Tylenol is fine to take  PREOPERATIVE BATHING INSTRUCTIONS:     Before your surgery, bathe with Hibiclens (4% Chlorhexidene) as instructed below  This skin cleanser will help reduce the bacteria on your skin before surgery  To avoid irritating your eyes, do not apply Hibiclens above the level of your neck   o On the evening before AND the morning of surgery, bathe your entire body except the face and scalp, then rinse freely  o DO NOT apply to your face or scalp, as Hibiclens can irritate your eyes   Purchasing information:   Hibiclens is available without a prescription at [facility]  ADDITIONAL INSTRUCTIONS:  PATIENTS HAVING FOOT/ANKLE SURGERY     In preparation for your upcoming surgery, we kindly request and advise the following:   Notify our office if you are taking any of the following:  Coumadin (warfarin):  Persantine (dipyridamole); Pletal (cilostazol); Plavix (clopidogrel); Ticlid (ticlopidine); Agrylin (anagrelide); Aggrenox (dipyridamole and aspirin) or other blood thinners,   In addition, stop taking Vitamin E and herbal supplements   Do not schedule any elective dental work for at least 6 months after surgery    If you had an ankle replacement, you will need to take antibiotics before any future dental procedures  Your dentist or our office can prescribe these for you  1000mg of Amoxicillin 1 hour prior to any dental procedure is the recommended dosing  THREE RULES:    1  After surgery you will most likely be given the instructions KEEP YOUR TOES ABOVE YOUR NOSE    This means that you MUST have your feet elevated higher than your heart  Keeping your toes above your nose helps to heal the muscles and skin (soft tissues) by reducing swelling in your leg  This position also helps to prevent infection, and is very important in avoiding deep venous thrombosis (blood clots)  2  In order to keep the blood circulating in your legs and in order to avoid deep vein   thrombosis (blood clots), we ask patients to GET UP ONCE AN HOUR during the day  This means you should at least cross the room and come back  It does not mean you have to be up for long periods of time  In most cases we will not have people immediately put any weight on their operated part  This is important to prevent loosening of metal or other devices holding the bones together  It also prevents irritation of the soft tissues which can lead to prolonged healing  When we say get up once an hour, please walk, hop or move with an assisted device  This is important! 3  Do not do any excessive walking during the first few days after surgery  Recovering from surgery is a full-time task for the patient  Postoperative care is important to avoid irritating the skin incision, which can lead to infection  Please do not plan activities or go out of town for several weeks after surgery  If you are unsure about your future activities, please schedule surgery only when you know it is acceptable for you  Scheduling surgery and then canceling the date, prevents other people from having surgery on that date as it takes time to line everything up effectively    If you cancel your surgery the week of your planned surgery, we reserve the right to cancel all future surgical procedures  THE DAY OF SURGERY:     Arrival to the hospital or outpatient surgical center on time is imperative  If you arrive late, then your surgery will be cancelled  You MUST have a family member/friend bring you, stay with you throughout the DURATION of your surgery, and drive you home   You MUST be fasting the day of your surgery  Therefore, do not consume any food or beverage after midnight the night before surgery  At your pre-operative visit with the anesthesia staff, or during your phone screen, a nurse will instruct you what medications you will need to take the day of surgery   MAKE SURE THAT THE PHARMACY LISTED IN THE ELECTRONIC MEDICAL RECORD (EPIC) IS YOUR PREFERRED PHARMACY  For example, if you are staying with family or a friend, and will not be near your preferred pharmacy, YOU MUST, tell the nurses checking you in the day of surgery so that this can be changed in the system  If your prescriptions are sent to a pharmacy, this cannot be changed  AFTER YOUR SURGERY:   Bleeding through the bandage almost always occurs  Do not let this alarm you  Simply add more gauze or a towel, call us, and come in for a dressing change  If you think it is excessive, contact us immediately or go to the local emergency room   Do not get the bandage wet  Showering is possible with plastic protectors  Be very careful, as the bathroom can be wet and slippery  If you do get your dressing wet, it should be changed immediately  Please contact us   ONCE YOUR ARE OUT OF YOUR CAST AND/OR REMOVABLE BOOT, SWELLING MAY PERSIST FOR MANY MONTHS  YOU MIGHT ALSO EXPERIENCE A BLUISH DISCOLORATION OF YOUR LEG  THIS IS NORMAL AND PART OF THE USUAL POSTOPERATIVE EXPERIENCE  WEARING COMPRESSION HOSE (ELASTIC STOCKINGS) CAN HELP AVOID SOME OF THIS SWELLING        DRESSING:   The purpose of the surgical dressing is to keep your wound and the surgical site protected from the environment  Most dressings contain splints, which help to hold your foot and ankle in a corrected position, and also allow the surgical site to heal properly  Dressings will remain in place and undisturbed until the first postop visit  If you have a drain in place, this will need to be removed in 1-3 days after surgery  The time for the drain to be pulled will be written on your discharge instruction sheet  CAST  INSTRUCTIONS:  You may or may not get a cast following surgery  If you do, pay close attention to the following:     After application of a splint or cast, it is very important to elevate your leg for 24 to 72 hours  The injured area should be elevated well above the heart  Remember Toes above your Nose  Rest and elevation greatly reduce pain and speed the healing process by minimizing early swelling  CALL YOUR DOCTORS OFFICE OR VISIT LOCATION EMERGENCY ROOM IF YOU HAVE ANY OF THE FOLLOWING:     Significant increased pain, which may be caused by swelling, and the feeling that the splint or cast is too tight   Numbness and tingling in your hand or foot, which may be caused by too much pressure on the nerves   Burning and stinging, which may be caused by too much pressure on the skin   Excessive swelling below the cast, which may mean the cast is slowing your blood circulation   Loss of active movement of toes, which request an urgent evaluation   Loss of capillary refill  Pinch the tip of toes and yvonne the skin  Release pressure and if the skin does not return pink then call the office immediately  DO NOT GET YOUR CAST WET  Bacteria thrive in moist dark areas  We do not want this  If your cast becomes wet, return to the office and we will apply another one  PAIN AFTER SURGERY:  Narcotic pain medication can and will depress your respiratory system if taken in excess  The goal of pain management with narcotics is to be comfortable not pain free    If you take enough narcotics to be pain free then you run the risk of stopping breathing  If this happens, call 911 immediately!  Pain in the heel is often  caused by pressure from the weight of your foot on the bed  Make sure your heel is suspended off the bed by keeping a pillow underneath your calf not your knee  Medications: You will be given narcotic pain medication  Do NOT drive while taking narcotic medications  Medications such as Darvocet, Percocet, Vicoden or Tylenol #3, also contain acetaminophen (Tylenol)  Do not take acetaminophen or Tylenol from home when taking theses medications  When you fill your prescription, you may ask the pharmacist if your pain medication has acetaminophen/Tylenol in it  It is okay to take Tylenol with Oxycontin/Oxycodone  Should you have pain after taking your prescription medication, ibuprophen (Motrin, Advil, and Alleve) is a common over the counter preparation and may often be taken with the prescription pain medication as long as you take them with food  These medications can irritate the stomach lining  Unless you are allergic to aspirin or currently taking a blood thinner, Dr Shante Ann patients are requested to take one 325 mg aspirin every 12 hours until you are back to walking normally after surgery (This can be up to 6 weeks)  Narcotic medications commonly cause nausea  Taking them with food will decrease this side effect  If you are having extreme nausea, please contact us for an alternative medication or for something that can be taken with this medication to decrease the nausea  Also, narcotic medications frequently cause constipation  An increase of fiber, fruits and vegetables in your diet may alleviate this problem, or if necessary, you may use an over-the-counter medication such as senekot, colace, or Fibercon for constipation problems  You should resume all medications you were taking prior to the surgery unless otherwise specified       Activity:   Because of your recent foot surgery, your activity level will decrease  You will need to elevate your foot ABOVE the level of your heart for a minimum of four days  The length of time necessary for the swelling to go down, and for your wounds to heal properly depends greatly on your efforts here  Elevation is extremely important to avoid compromising the blood supply to your foot  Remember when your foot is down it will swell, which will increase pain and slow healing  Wiggle your toes frequently if possible  If you go home with a regional block, (a type of anesthesia) the foot and leg will be numb  Think of ways to get into your house and around the house until the block wears off  Keep in mind that it may be a legal issue if you drive while in a cast or splint, especially when the splint is on the right foot  You may call the Department of Motor Vehicles to schedule a road test if you have adaptive equipment applied to your car  The amount of weight you are allowed to bear on your foot will be written on your discharge sheet filled out at the time of surgery  The following is an explanation of the possibilities:     Heel-only weight bearing:   Usually, this order is given for use with a special shoe only, which will help you to put weight only on your heel  You may bear your body weight on your foot, as long as it is only borne on your heel

## 2021-09-10 ENCOUNTER — TELEPHONE (OUTPATIENT)
Dept: OBGYN CLINIC | Facility: CLINIC | Age: 61
End: 2021-09-10

## 2021-09-14 ENCOUNTER — OFFICE VISIT (OUTPATIENT)
Dept: FAMILY MEDICINE CLINIC | Facility: HOSPITAL | Age: 61
End: 2021-09-14
Payer: MEDICARE

## 2021-09-14 VITALS
BODY MASS INDEX: 34.53 KG/M2 | HEART RATE: 97 BPM | OXYGEN SATURATION: 98 % | WEIGHT: 220 LBS | HEIGHT: 67 IN | DIASTOLIC BLOOD PRESSURE: 68 MMHG | SYSTOLIC BLOOD PRESSURE: 122 MMHG

## 2021-09-14 DIAGNOSIS — N18.4 CKD (CHRONIC KIDNEY DISEASE) STAGE 4, GFR 15-29 ML/MIN (HCC): ICD-10-CM

## 2021-09-14 DIAGNOSIS — J45.40 MODERATE PERSISTENT ASTHMA WITHOUT COMPLICATION: ICD-10-CM

## 2021-09-14 DIAGNOSIS — I10 ESSENTIAL HYPERTENSION: ICD-10-CM

## 2021-09-14 DIAGNOSIS — M21.611 BUNION OF RIGHT FOOT: ICD-10-CM

## 2021-09-14 DIAGNOSIS — R01.1 CARDIAC MURMUR: ICD-10-CM

## 2021-09-14 DIAGNOSIS — Z01.818 PRE-OP EXAM: Primary | ICD-10-CM

## 2021-09-14 PROBLEM — S92.515A CLOSED NONDISPLACED FRACTURE OF PROXIMAL PHALANX OF LESSER TOE OF LEFT FOOT: Status: RESOLVED | Noted: 2021-05-06 | Resolved: 2021-09-14

## 2021-09-14 PROBLEM — U07.1 PNEUMONIA DUE TO COVID-19 VIRUS: Status: RESOLVED | Noted: 2021-01-18 | Resolved: 2021-09-14

## 2021-09-14 PROBLEM — J12.82 PNEUMONIA DUE TO COVID-19 VIRUS: Status: RESOLVED | Noted: 2021-01-18 | Resolved: 2021-09-14

## 2021-09-14 PROCEDURE — 99214 OFFICE O/P EST MOD 30 MIN: CPT | Performed by: STUDENT IN AN ORGANIZED HEALTH CARE EDUCATION/TRAINING PROGRAM

## 2021-09-14 NOTE — PROGRESS NOTES
[address] [facility] [address]           NAME: Zonia oJhnson  AGE: 63 y o  SEX: female  : 1960     DATE: 2021    [facility] Pre-Operative Evaluation      Chief Complaint: Pre-operative Evaluation     Surgery:  Right bunionectomy  Anticipated Date of Surgery: 21  Referring Provider: Abhishek Pennington MD       History of Present Illness:     Zonia Johnson is a 63 y o  female who presents to the office today for a preoperative consultation at the request of surgeon, Dr Zehra cabral, who plans on performing right bunionectomy on 21  Planned anesthesia is general  Patient has a bleeding risk of: no recent abnormal bleeding and no remote history of abnormal bleeding  Patient does not have objections to receiving blood products if needed  Current anti-platelet/anti-coagulation medications that the patient is prescribed includes: Aspirin  Assessment of Chronic Conditions:   - Asthma: Well controlled on daily Symbicort, only needing rare use of the albuterol inhaler  - Hypertension: Well controlled on current carvedilol b i d   And amiloride daily  - CKD stage 4, chronic for patient, stable on blood work in August     Assessment of Cardiac Risk:  · Denies unstable or severe angina or MI in the last 6 weeks or history of stent placement in the last year   · Denies decompensated heart failure (e g  New onset heart failure, NYHA functional class IV heart failure, or worsening existing heart failure)  · Denies significant arrhythmias such as high grade AV block, symptomatic ventricular arrhythmia, newly recognized ventricular tachycardia, supraventricular tachycardia with resting heart rate >100, or symptomatic bradycardia  · Denies severe heart valve disease including aortic stenosis or symptomatic mitral stenosis     Exercise Capacity:  · Able to walk 4 blocks without symptoms?: Yes, limited by knee pain  · Able to walk 2 flights without symptoms?: Yes    Prior Anesthesia Reactions: No, occasional nausea  Personal history of venous thromboembolic disease? No  History of steroid use for >2 weeks within last year? No     Review of Systems:     Review of Systems   Constitutional: Negative for chills, fatigue and fever  HENT: Negative for congestion and rhinorrhea  Respiratory: Negative for cough and shortness of breath  Occasional NAIK   Cardiovascular: Negative for chest pain, palpitations and leg swelling  Gastrointestinal: Negative for diarrhea, nausea and vomiting  Genitourinary: Negative for dysuria and frequency  Musculoskeletal: Positive for arthralgias (B/L knee pain L>R)  Negative for gait problem  Skin: Negative for rash and wound  Neurological: Negative for dizziness, light-headedness and headaches  Psychiatric/Behavioral: Negative for dysphoric mood and suicidal ideas         Current Problem List:     Patient Active Problem List   Diagnosis    Hypercholesteremia    Spondylolisthesis at L5-S1 level    Renal cyst    Vitamin D deficiency    [facility]    Herniated nucleus pulposus, L5-S1    [facility]iopathic [facility] pancreatitis ([facility])    Primary osteoarthritis of right knee    Age related osteoporosis    Cardiac murmur    Rectal prolapse    Elevated LFTs    Hypertension    Hyperprolactinemia ([facility])    Bipolar 1 disorder, depressed, severe ([facility])    Obsessive compulsive disorder    Panic disorder with agoraphobia    Eating disorder    [facility]    Benign neoplasm of colon    Drug-induced constipation    Infarction of left basal ganglia ([facility])    Abnormal chest CT    Hyperphosphatemia    Abnormal thyroid exam    Thyroid nodule    Moderate persistent asthma without complication    Primary osteoarthritis of left knee    Steal syndrome dialysis vascular access [facility]    AVF (arteriovenous fistula) ([facility] )    Right patellofemoral syndrome    CKD (chronic kidney disease) stage 4, GFR 15-29 ml/min ([facility])    Bilateral sacroiliitis (HCC)    Hallux valgus, right    Acquired hallux interphalangeus of right foot    Pneumonia due to COVID-19 virus    Effusion of right knee    Left knee tendonitis    PONV (postoperative nausea and vomiting)    Closed nondisplaced fracture of proximal phalanx of lesser toe of left foot    Family history of cardiac disorder in father       Allergies:      Allergies   Allergen Reactions    Pollen Extract Nasal Congestion       Current Medications:       Current Outpatient Medications:     acetaminophen (TYLENOL) 325 mg tablet, Take 650 mg by mouth every 6 (six) hours as needed for mild pain, Disp: , Rfl:     albuterol (Ventolin HFA) 90 mcg/act inhaler, Inhale 2 puffs every 6 (six) hours as needed for wheezing or shortness of breath, Disp: 8 5 g, Rfl: 3    alendronate (Fosamax) 70 mg tablet, Take 1 tablet (70 mg total) by mouth every 7 days, Disp: 4 tablet, Rfl: 5    AMILoride 5 mg tablet, Take 1 tablet (5 mg total) by mouth 2 (two) times a day, Disp: 120 tablet, Rfl: 0    aspirin (ECOTRIN LOW STRENGTH) 81 mg EC tablet, Take 1 tablet (81 mg total) by mouth daily, Disp: 30 tablet, Rfl: 0    atorvastatin (LIPITOR) 40 mg tablet, Take 1 tablet (40 mg total) by mouth daily, Disp: 90 tablet, Rfl: 3    budesonide-formoterol (SYMBICORT) 160-4 5 mcg/act inhaler, Inhale 2 puffs 2 (two) times a day Rinse mouth after use , Disp: 30 6 g, Rfl: 3    carvedilol (COREG) 3 125 mg tablet, Take 2 tablets (6 25 mg total) by mouth 2 (two) times a day with meals, Disp: 180 tablet, Rfl: 3    clonazePAM (KlonoPIN) 0 5 mg tablet, Take 1 tablet (0 5 mg total) by mouth 3 (three) times a day, Disp: 270 tablet, Rfl: 0    fluocinonide (LIDEX) 0 05 % ointment, Apply topically 2 (two) times a day, Disp: 60 g, Rfl: 1    fluvoxaMINE (LUVOX) 100 mg tablet, 3 at bedtime, Disp: , Rfl:     HYDROcodone-acetaminophen (NORCO) 5-325 mg per tablet, Take 1 tablet by mouth every 12 (twelve) hours as needed for painMax Daily Amount: 2 tablets, Disp: 20 tablet, Rfl: 0    lamoTRIgine (LaMICtal) 200 MG tablet, Take 400 mg by mouth daily at bedtime , Disp: , Rfl:     naloxone (NARCAN) 4 mg/0 1 mL nasal spray, Administer 1 spray into a nostril   If breathing does not return to normal or if breathing difficulty resumes after 2-3 minutes, give another dose in the other nostril using a new spray , Disp: 1 each, Rfl: 1    omeprazole (PriLOSEC) 20 mg delayed release capsule, Take 1 capsule (20 mg total) by mouth daily at bedtime, Disp: 30 capsule, Rfl: 3    ondansetron (ZOFRAN-ODT) 4 mg disintegrating tablet, DISSOLVE 1 TABLET IN MOUTH EVERY 8 HOURS AS NEEDED FOR NAUSEA AND VOMITING, Disp: 180 tablet, Rfl: 0    Polyethylene Glycol 3350 (MIRALAX PO), Take by mouth as needed , Disp: , Rfl:     QUEtiapine (SEROquel) 100 mg tablet, Take 1 tablet by mouth daily at bedtime 1 at bedtime in addition to 400 mg tab, Disp: , Rfl:     QUEtiapine (SEROQUEL) 300 mg tablet, Take 1 tablet (300 mg total) by mouth every morning 1 in the AM     ( also takes 400 mg at bedtime), Disp: 90 tablet, Rfl: 3    QUEtiapine (SEROquel) 400 MG tablet, 1 at bedtime     (also takes 100 mg and 300 mg tabs), Disp: , Rfl:     traMADol (ULTRAM) 50 mg tablet, Take 2 tabs by mouth twice daily, Disp: 120 tablet, Rfl: 0    linaCLOtide (Linzess) 290 MCG CAPS, Take 1 capsule by mouth daily, Disp: 90 capsule, Rfl: 3    Past Medical History:       Past Medical History:   Diagnosis Date    Anxiety     Anxiety disorder     Bipolar 2 disorder (HCC)     Chronic back pain     Closed fracture of distal end of right fibula with routine healing 11/4/2020    COVID-19     in Jan 2021    CVA (cere[facility] vascular accident) [facility])     noted on MRI in the past    Depression     GERD (gastroesophageal reflux disease)     Hypercholesteremia     Hypernatremia     Hypertension     Hypokalemia  Idiopathic chronic pancreatitis ([facility] ) 3/20/2018    Intervertebral disc disorder with radiculopathy of lumbosacral region     resolved: 2015    Kidney disease     Panic attacks     Pericardial effusion     Radiculitis     resolved: 2015    Secondary renal hyperparathyroidism ([facility] )     Vitamin D deficiency         Past Surgical History:   Procedure Laterality Date    BUNIONECTOMY      Left foot     COLON SURGERY      COLONOSCOPY  2021    DILATION AND CURETTAGE OF UTERUS      INDUCED       surgically induced    HI ANASTOMOSIS,AV,ANY SITE Left 2019    Procedure: CREATION FISTULA ARTERIOVENOUS (AV) left wrists possible left upper;  Surgeon: Aristeo Bull MD;  [facility];  Service: Vascular    [facility] Jordis Caprice Left 2019    Procedure: Sasha Haney;  Surgeon: Fran Garcia DPM;  [facility];  Service: [address] [place] [address] W/SESMDC W/DIST Jordis Caprice Right 8/3/2020    Procedure: Lalit Mcallister;  Surgeon: Fran Garcia DPM;  [facility];  Service: Podiatry    HI ERCP DX COLLECTION SPECIMEN BRUSHING/WASHING N/A 2018    Procedure: ENDOSCOPIC RETROGRADE CHOLANGIOPANCREATOGRAPHY (ERCP);   Surgeon: Rohan Roberts MD;  [facility]: [facility];  Service: Gastroenterology    HI LAP, SURG PROCTOPEXY N/A 2018    Procedure: ROBOTIC SIGMOID RESECTION / RECTOPEXY;  Surgeon: Marjorie Pugh MD;  Location: [facility];  Service: Colorectal    HI SIGMOIDOSCOPY FLX DX W/COLLJ SPEC BR/WA IF PFRMD N/A 2018    Procedure: Braxton Rosas;  Surgeon: Marjorie Pugh MD;  [facility]: [facility];  Service: Colorectal    TUBAL LIGATION Bilateral [facility] THYROID BIOPSY  2019        Family History   Problem Relation Age of Onset    Bipolar disorder Mother     Mental illness Mother         depression    Stroke Mother     Dementia Mother     Colon polyps Mother    Deyanira Packo Heart disease Father     Hypertension Father     Diabetes Father     Other Family         Back disorder    Diabetes Family     Heart disease Family     Hypertension Family     Stroke Family     Thyroid disease Family     Breast cancer Paternal Grandmother         age unknown   Areta Emmer Breast cancer Paternal [de-identified]         age unknown   Areta Emmer Breast cancer Maternal Aunt         age unknown    Mental illness Sister     Colon polyps Sister     Mental illness Sister     Heart disease Sister     No Known Problems Sister     Breast cancer Sister 76    Other Son         pituitary tumor    Hypertension Son     Obesity Son     No Known Problems Son     No Known Problems Maternal Grandmother     No Known Problems Maternal Grandfather     No Known Problems Paternal Grandfather     Breast cancer Paternal Aunt         age unknown    Substance Abuse Neg Hx         neg fam hx    Colon cancer Neg Hx         Social History     Socioeconomic History    Marital status:      Spouse name: Not on file    Number of children: Not on file    Years of education: Not on file    Highest education level: Not on file   Occupational History    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   Tobacco Use    Smoking status: Never Smoker    Smokeless tobacco: Never Used   Vaping Use    Vaping Use: Never used   Substance and Sexual Activity    Alcohol use: Never    Drug use: Not Currently     Types: Marijuana    Sexual activity: Not Currently   Other Topics Concern    Not on file   Social History Narrative    Daily caffeine consumption 2-3 servings a day    Lives with family  No living will  Has dentures---no dental care  Primary language--English  Feels safe at home       Social Determinants of Health     Financial Resource Strain:     Difficulty of Paying Living Expenses:    Food Insecurity:     Worried About Running Out of Food in the Last Year:     [address] in the Last Year:    Transportation Needs: No Transportation Needs    Lack of Transportation (Medical): No    Lack of Transportation (Non-Medical): No   Physical Activity: Inactive    Days of Exercise per Week: 0 days    Minutes of Exercise per Session: 0 min   Stress: Stress Concern Present    Feeling of Stress : To some extent   Social Connections:     Frequency of Communication with Friends and Family:     Frequency of Social Gatherings with Friends and Family:     Attends [employer] Services:     Active Member of Clubs or Organizations:     Attends Club or Organization Meetings:     Marital Status:    Intimate Partner Violence: Not At Risk    Fear of Current or Ex-Partner: No    Emotionally Abused: No    Physically Abused: No    Sexually Abused: No        Physical Exam:     /68   Pulse 97   Ht 5' 7" (1 702 m)   Wt 99 8 kg (220 lb)   LMP  (LMP Unknown)   SpO2 98%   BMI 34 46 kg/m²     Physical Exam  Vitals reviewed  Constitutional:       General: She is not in acute distress  Appearance: Normal appearance  She is well-developed  She is obese  She is not ill-appearing  HENT:      Head: Normocephalic and atraumatic  Right Ear: Tympanic membrane and external ear normal  There is no impacted cerumen  Left Ear: Tympanic membrane and external ear normal  There is no impacted cerumen  Ears:      Comments: Narrow canals b/l     Nose: Nose normal  No congestion  Mouth/Throat:      Mouth: Mucous membranes are moist       Pharynx: Oropharynx is clear  Comments: Edentulous  Eyes:      General: No scleral icterus  Right eye: No discharge  Left eye: No discharge  Cardiovascular:      Rate and Rhythm: Normal rate and regular rhythm  Pulses: Normal pulses  Heart sounds: Murmur (chronic, mild) heard  No friction rub  No gallop  Pulmonary:      Effort: Pulmonary effort is normal  No respiratory distress  Breath sounds: Normal breath sounds  No stridor  No wheezing     Abdominal: General: Abdomen is flat  Bowel sounds are normal  There is no distension  Palpations: Abdomen is soft  There is no mass  Tenderness: There is no abdominal tenderness  Musculoskeletal:         General: Deformity (right great toe over lapping second digit) present  Cervical back: Normal range of motion  No rigidity or tenderness  Right lower leg: No edema  Left lower leg: No edema  Lymphadenopathy:      Cervical: No cervical adenopathy  Skin:     General: Skin is warm and dry  Findings: No erythema or rash  Neurological:      Mental Status: She is alert and oriented to person, place, and time  Gait: Gait normal    Psychiatric:         Mood and Affect: Mood normal          Behavior: Behavior normal          Thought Content: Thought content normal          Judgment: Judgment normal           Data:     Pre-operative work-up  Laboratory Results: I have personally reviewed the pertinent laboratory results/reports    CBC in August hemoglobin 12 1, WBC 6 3, platelets 802  CMP in August potassium 5 4, BUN 40, creatinine 2 36, stable for patient   Estimated GFR 22, consistent with stage IV CKD, longstanding for patient     EKG: I have personally reviewed pertinent reports  1/12/21 - normal sinus rhythm 84 BPM    Chest x-ray: I have personally reviewed pertinent reports  Previous cardiopulmonary studies within the past year:  · Echocardiogram:  3/20/19 EF 95% grade 1 diastolic dysfunction no regional wall motion abnormalities      Assessment & Recommendations:     No diagnosis found  Pre-Op Evaluation Assessment  63 y o  female with planned surgery:  Right bunionectomy    Known risk factors for perioperative complications: Renal dysfunction  Current medications which may produce withdrawal symptoms if withheld perioperatively: none    Pre-Op Evaluation Plan  1  Further preoperative workup as follows:   - None; no further preoperative work-up is required    2  Medication Management/Recommendations:   Hold ASA, tylenol, Klonopin on day of surgery  3  Prophylaxis for cardiac events with perioperative beta-blockers: on carvedilol  4  Patient requires further consultation with: None    Clearance  Patient is CLEARED for surgery without any additional cardiac testing       [facility] [address] [place] [id number]  Phone#  [phone]  Fax#  [phone]

## 2021-09-22 NOTE — PRE-PROCEDURE INSTRUCTIONS
Pre-Surgery Instructions:   Medication Instructions    acetaminophen (TYLENOL) 325 mg tablet Instructed patient per Anesthesia Guidelines  prn,may use    albuterol (Ventolin HFA) 90 mcg/act inhaler Instructed patient per Anesthesia Guidelines  prn,may use    alendronate (Fosamax) 70 mg tablet Instructed patient per Anesthesia Guidelines  weekly    AMILoride 5 mg tablet Instructed patient per Anesthesia Guidelines  hold am of sx    atorvastatin (LIPITOR) 40 mg tablet Instructed patient per Anesthesia Guidelines  takes hs    budesonide-formoterol (SYMBICORT) 160-4 5 mcg/act inhaler Instructed patient per Anesthesia Guidelines  prn,may use    carvedilol (COREG) 3 125 mg tablet Instructed patient per Anesthesia Guidelines  take am of sx    clonazePAM (KlonoPIN) 0 5 mg tablet Instructed patient per Anesthesia Guidelines  take am of sx    fluocinonide (LIDEX) 0 05 % ointment Instructed patient per Anesthesia Guidelines  prn    fluvoxaMINE (LUVOX) 100 mg tablet Instructed patient per Anesthesia Guidelines  may take am of sx    lamoTRIgine (LaMICtal) 200 MG tablet Instructed patient per Anesthesia Guidelines  take am of sx    linaCLOtide (Linzess) 290 MCG CAPS Instructed patient per Anesthesia Guidelines  prn    ondansetron (ZOFRAN-ODT) 4 mg disintegrating tablet Instructed patient per Anesthesia Guidelines  prn,may use    Polyethylene Glycol 3350 (MIRALAX PO) Instructed patient per Anesthesia Guidelines  prn    QUEtiapine (SEROquel) 100 mg tablet Instructed patient per Anesthesia Guidelines  take am of sx    QUEtiapine (SEROquel) 400 MG tablet Instructed patient per Anesthesia Guidelines  takes hs    You will receive a phone call from hospital for arrival time  Please call surgeons office if any changes in your condition  Wear easy on/off clothing; consider type of surgery;  Valuables, jewelry, piercing's please keep at home      **COVID-19  education/surgical guidelines      Please: No contact lenses or eye make up, artificial eyelashes  Pt driving self to hospital , sister picking up  Please secure transportation  Pt to bring boot as per surgeon  Follow pre surgery showering or cleaning instructions as  Reviewed by nurse or surgeons office      Questions answered and concerns addressed

## 2021-09-27 ENCOUNTER — HOSPITAL ENCOUNTER (OUTPATIENT)
Facility: HOSPITAL | Age: 61
Setting detail: OUTPATIENT SURGERY
Discharge: HOME | End: 2021-09-27
Attending: ORTHOPAEDIC SURGERY | Admitting: ORTHOPAEDIC SURGERY
Payer: MEDICARE

## 2021-09-27 ENCOUNTER — ANESTHESIA EVENT (OUTPATIENT)
Dept: PERIOP | Facility: HOSPITAL | Age: 61
End: 2021-09-27
Payer: MEDICARE

## 2021-09-27 ENCOUNTER — ANESTHESIA (OUTPATIENT)
Dept: PERIOP | Facility: HOSPITAL | Age: 61
End: 2021-09-27
Payer: MEDICARE

## 2021-09-27 ENCOUNTER — APPOINTMENT (OUTPATIENT)
Dept: RADIOLOGY | Facility: HOSPITAL | Age: 61
End: 2021-09-27
Attending: ORTHOPAEDIC SURGERY
Payer: MEDICARE

## 2021-09-27 VITALS
TEMPERATURE: 97.8 F | WEIGHT: 215 LBS | HEART RATE: 96 BPM | SYSTOLIC BLOOD PRESSURE: 169 MMHG | DIASTOLIC BLOOD PRESSURE: 71 MMHG | OXYGEN SATURATION: 97 % | RESPIRATION RATE: 18 BRPM | BODY MASS INDEX: 33.67 KG/M2

## 2021-09-27 DIAGNOSIS — M20.11 ACQUIRED HALLUX INTERPHALANGEUS, RIGHT: Primary | ICD-10-CM

## 2021-09-27 DIAGNOSIS — M20.5X1 CLAW TOE, ACQUIRED, RIGHT: ICD-10-CM

## 2021-09-27 DIAGNOSIS — S99.921A INJURY OF PLANTAR PLATE, RIGHT, INITIAL ENCOUNTER: ICD-10-CM

## 2021-09-27 PROCEDURE — 28270 RELEASE OF FOOT CONTRACTURE: CPT | Performed by: PHYSICIAN ASSISTANT

## 2021-09-27 PROCEDURE — 28298 COR HLX VLGS PRX PHLX OSTEOT: CPT | Performed by: ORTHOPAEDIC SURGERY

## 2021-09-27 PROCEDURE — 28298 COR HLX VLGS PRX PHLX OSTEOT: CPT | Performed by: PHYSICIAN ASSISTANT

## 2021-09-27 PROCEDURE — 28308 INCISION OF METATARSAL: CPT | Performed by: ORTHOPAEDIC SURGERY

## 2021-09-27 PROCEDURE — 28270 RELEASE OF FOOT CONTRACTURE: CPT | Performed by: ORTHOPAEDIC SURGERY

## 2021-09-27 PROCEDURE — C1713 ANCHOR/SCREW BN/BN,TIS/BN: HCPCS | Performed by: ORTHOPAEDIC SURGERY

## 2021-09-27 PROCEDURE — 73620 X-RAY EXAM OF FOOT: CPT

## 2021-09-27 PROCEDURE — 28313 REPAIR DEFORMITY OF TOE: CPT | Performed by: ORTHOPAEDIC SURGERY

## 2021-09-27 PROCEDURE — 28313 REPAIR DEFORMITY OF TOE: CPT | Performed by: PHYSICIAN ASSISTANT

## 2021-09-27 PROCEDURE — 28285 REPAIR OF HAMMERTOE: CPT | Performed by: PHYSICIAN ASSISTANT

## 2021-09-27 PROCEDURE — 28285 REPAIR OF HAMMERTOE: CPT | Performed by: ORTHOPAEDIC SURGERY

## 2021-09-27 PROCEDURE — C9290 INJ, BUPIVACAINE LIPOSOME: HCPCS | Performed by: ORTHOPAEDIC SURGERY

## 2021-09-27 PROCEDURE — 28308 INCISION OF METATARSAL: CPT | Performed by: PHYSICIAN ASSISTANT

## 2021-09-27 DEVICE — SCREW COMP 2.5 X 24MM MICRO FT: Type: IMPLANTABLE DEVICE | Site: FOOT | Status: FUNCTIONAL

## 2021-09-27 DEVICE — DYNANITE NITI STAPLE W/INSTRS 9WX10L
Type: IMPLANTABLE DEVICE | Site: FOOT | Status: FUNCTIONAL
Brand: ARTHREX®

## 2021-09-27 DEVICE — SCREW CORT 2 X 12MM QUICKFIX SML JOINT: Type: IMPLANTABLE DEVICE | Site: FOOT | Status: FUNCTIONAL

## 2021-09-27 RX ORDER — ONDANSETRON 2 MG/ML
4 INJECTION INTRAMUSCULAR; INTRAVENOUS ONCE
Status: DISCONTINUED | OUTPATIENT
Start: 2021-09-27 | End: 2021-09-27 | Stop reason: HOSPADM

## 2021-09-27 RX ORDER — DEXAMETHASONE SODIUM PHOSPHATE 4 MG/ML
INJECTION, SOLUTION INTRA-ARTICULAR; INTRALESIONAL; INTRAMUSCULAR; INTRAVENOUS; SOFT TISSUE AS NEEDED
Status: DISCONTINUED | OUTPATIENT
Start: 2021-09-27 | End: 2021-09-27

## 2021-09-27 RX ORDER — ONDANSETRON 2 MG/ML
INJECTION INTRAMUSCULAR; INTRAVENOUS AS NEEDED
Status: DISCONTINUED | OUTPATIENT
Start: 2021-09-27 | End: 2021-09-27

## 2021-09-27 RX ORDER — CHLORHEXIDINE GLUCONATE 4 G/100ML
SOLUTION TOPICAL DAILY PRN
Status: DISCONTINUED | OUTPATIENT
Start: 2021-09-27 | End: 2021-09-27 | Stop reason: HOSPADM

## 2021-09-27 RX ORDER — MIDAZOLAM HYDROCHLORIDE 2 MG/2ML
INJECTION, SOLUTION INTRAMUSCULAR; INTRAVENOUS AS NEEDED
Status: DISCONTINUED | OUTPATIENT
Start: 2021-09-27 | End: 2021-09-27

## 2021-09-27 RX ORDER — FENTANYL CITRATE 50 UG/ML
INJECTION, SOLUTION INTRAMUSCULAR; INTRAVENOUS AS NEEDED
Status: DISCONTINUED | OUTPATIENT
Start: 2021-09-27 | End: 2021-09-27

## 2021-09-27 RX ORDER — CEFAZOLIN SODIUM 2 G/50ML
SOLUTION INTRAVENOUS AS NEEDED
Status: DISCONTINUED | OUTPATIENT
Start: 2021-09-27 | End: 2021-09-27

## 2021-09-27 RX ORDER — FENTANYL CITRATE/PF 50 MCG/ML
25 SYRINGE (ML) INJECTION
Status: DISCONTINUED | OUTPATIENT
Start: 2021-09-27 | End: 2021-09-27 | Stop reason: HOSPADM

## 2021-09-27 RX ORDER — CEFAZOLIN SODIUM 2 G/50ML
2000 SOLUTION INTRAVENOUS ONCE
Status: DISCONTINUED | OUTPATIENT
Start: 2021-09-27 | End: 2021-09-27 | Stop reason: HOSPADM

## 2021-09-27 RX ORDER — PROPOFOL 10 MG/ML
INJECTION, EMULSION INTRAVENOUS AS NEEDED
Status: DISCONTINUED | OUTPATIENT
Start: 2021-09-27 | End: 2021-09-27

## 2021-09-27 RX ORDER — LIDOCAINE HYDROCHLORIDE 20 MG/ML
INJECTION, SOLUTION EPIDURAL; INFILTRATION; INTRACAUDAL; PERINEURAL AS NEEDED
Status: DISCONTINUED | OUTPATIENT
Start: 2021-09-27 | End: 2021-09-27

## 2021-09-27 RX ORDER — ASPIRIN 325 MG
325 TABLET, DELAYED RELEASE (ENTERIC COATED) ORAL 2 TIMES DAILY
Qty: 84 TABLET | Refills: 0 | Status: SHIPPED | OUTPATIENT
Start: 2021-09-27 | End: 2021-12-28

## 2021-09-27 RX ORDER — OXYCODONE HYDROCHLORIDE 5 MG/1
5 TABLET ORAL EVERY 4 HOURS PRN
Qty: 30 TABLET | Refills: 0 | Status: SHIPPED | OUTPATIENT
Start: 2021-09-27 | End: 2021-10-02

## 2021-09-27 RX ORDER — BUPIVACAINE HYDROCHLORIDE 2.5 MG/ML
INJECTION, SOLUTION EPIDURAL; INFILTRATION; INTRACAUDAL AS NEEDED
Status: DISCONTINUED | OUTPATIENT
Start: 2021-09-27 | End: 2021-09-27 | Stop reason: HOSPADM

## 2021-09-27 RX ORDER — SODIUM CHLORIDE 9 MG/ML
INJECTION, SOLUTION INTRAVENOUS CONTINUOUS PRN
Status: DISCONTINUED | OUTPATIENT
Start: 2021-09-27 | End: 2021-09-27

## 2021-09-27 RX ORDER — ONDANSETRON 4 MG/1
4 TABLET, FILM COATED ORAL EVERY 8 HOURS PRN
Qty: 20 TABLET | Refills: 0 | Status: SHIPPED | OUTPATIENT
Start: 2021-09-27 | End: 2021-11-19 | Stop reason: SDUPTHER

## 2021-09-27 RX ADMIN — FENTANYL CITRATE 50 MCG: 50 INJECTION, SOLUTION INTRAMUSCULAR; INTRAVENOUS at 07:44

## 2021-09-27 RX ADMIN — LIDOCAINE HYDROCHLORIDE 50 MG: 20 INJECTION, SOLUTION EPIDURAL; INFILTRATION; INTRACAUDAL; PERINEURAL at 07:35

## 2021-09-27 RX ADMIN — PROPOFOL 150 MG: 10 INJECTION, EMULSION INTRAVENOUS at 07:35

## 2021-09-27 RX ADMIN — ONDANSETRON 4 MG: 2 INJECTION INTRAMUSCULAR; INTRAVENOUS at 07:58

## 2021-09-27 RX ADMIN — FENTANYL CITRATE 50 MCG: 50 INJECTION, SOLUTION INTRAMUSCULAR; INTRAVENOUS at 07:43

## 2021-09-27 RX ADMIN — DEXAMETHASONE SODIUM PHOSPHATE 4 MG: 4 INJECTION, SOLUTION INTRAMUSCULAR; INTRAVENOUS at 07:35

## 2021-09-27 RX ADMIN — MIDAZOLAM 1 MG: 1 INJECTION INTRAMUSCULAR; INTRAVENOUS at 07:30

## 2021-09-27 RX ADMIN — CEFAZOLIN SODIUM 2000 MG: 2 SOLUTION INTRAVENOUS at 07:39

## 2021-09-27 RX ADMIN — SODIUM CHLORIDE: 9 INJECTION, SOLUTION INTRAVENOUS at 07:41

## 2021-09-27 NOTE — ANESTHESIA PREPROCEDURE EVALUATION
Procedure:  Mickie Tadeo, right tameka osteotomy and 2nd claw toe correction with plantar plate repair (Right Foot)    Relevant Problems   ANESTHESIA   (+) PONV (postoperative nausea and vomiting)      CARDIO   (+) Cardiac murmur   (+) Hypercholesteremia   (+) Hypertension      ENDO   (+) Hyperparathyroidism (HCC)   (+) Secondary renal hyperparathyroidism (HCC)      GI/HEPATIC   (+) Idiopathic chronic pancreatitis (HCC)      /RENAL   (+) CKD (chronic kidney disease) stage 4, GFR 15-29 ml/min (HCC)   (+) Renal cyst      MUSCULOSKELETAL   (+) Bilateral sacroiliitis (HCC)   (+) Primary osteoarthritis of left knee   (+) Primary osteoarthritis of right knee      NEURO/PSYCH   (+) Anxiety   (+) Obsessive compulsive disorder   (+) Panic disorder with agoraphobia      PULMONARY   (+) Moderate persistent asthma without complication        Physical Exam    Airway    Mallampati score: II  TM Distance: >3 FB  Neck ROM: full     Dental   upper dentures and lower dentures,     Cardiovascular  Cardiovascular exam normal    Pulmonary  Pulmonary exam normal     Other Findings        Anesthesia Plan  ASA Score- 3     Anesthesia Type- general with ASA Monitors  Additional Monitors:   Airway Plan: LMA  Plan Factors-    Chart reviewed  Patient summary reviewed  Patient is not a current smoker  Induction- intravenous  Postoperative Plan- Plan for postoperative opioid use  Informed Consent- Anesthetic plan and risks discussed with patient  I personally reviewed this patient with the CRNA  Discussed and agreed on the Anesthesia Plan with the CRNA  Karishma Arreola

## 2021-09-27 NOTE — INTERVAL H&P NOTE
H&P reviewed  After examining the patient I find no changes in the patients condition since the H&P had been written  Vitals:    09/27/21 0634   BP: 151/79   Pulse: 99   Resp: 18   Temp: 98 °F (36 7 °C)   SpO2: 99%     Right bunion correction and 2nd clawtoe correction with possible plantar plate repair

## 2021-09-27 NOTE — ANESTHESIA POSTPROCEDURE EVALUATION
Post-Op Assessment Note    CV Status:  Stable  Pain Score: 0    Pain management: adequate     Mental Status:  Awake   Hydration Status:  Euvolemic   PONV Controlled:  None   Airway Patency:  Patent      Post Op Vitals Reviewed: Yes      Staff: CRNA     +bruit +thrill of AV fistula upon handoff in PACU    No complications documented      BP   197/93   Temp 98 1   Pulse 94   Resp 14   SpO2 99%

## 2021-09-27 NOTE — OP NOTE
OPERATIVE REPORT  PATIENT NAME: Raheel Carrillo    :  1960  MRN: [id number]  Pt Location:  OR ROOM 02    SURGERY DATE: 2021    Surgeon(s) and Role:     Nikki Card MD - Primary     * Moon Thomas PA-C - Assisting    Preop Diagnosis:  Acquired hallux interphalangeus, right [M20 11]  Claw toe, acquired, right [M20 5X1]  Injury of plantar plate, right, initial encounter [U31 650J]    Post-Op Diagnosis Codes:     * Acquired hallux interphalangeus, right [M20 11]     * Claw toe, acquired, right [M20 5X1]     * Injury of plantar plate, right, initial encounter [K86 429O]    Procedure(s) (LRB):  BUNIONECTOMY TAMEKA, right tameka osteotomy and 2nd claw toe correction (Right)    Specimen(s):  * No specimens in log *    Estimated Blood Loss:   Minimal    Drains:  * No LDAs found *    Anesthesia Type:   Choice    Operative Indications:  Acquired hallux interphalangeus, right [M20 11]  Claw toe, acquired, right [M20 5X1]  Injury of plantar plate, right, initial encounter [J46 482K]      Operative Findings:  Consistent with diagnosis    Complications:   None    Procedure and Technique:       PROCEDURE(S):  1  Right Akin osteotomy of proximal phalanx to correct Hallux valgus interphalageus  2  Z lengthening of EDC tendon 2nd toe  3  Complete capsular release of 2nd MTP joint  4  Metatarsal shortening osteotomy 2nd metatarsal (Weil osteotomy)  5  Proximal interphalangeal joint arthrodesis 2nd PIP joint  6  Intraop fluoroscopic interpretation, no radiologist, greater than one hour        OPERATIVE REPORT:    After informed consent, preop medial clearance obtained, patient taken to preop area, properly assessed  See anesthesia report for details of anesthesia administered  Patient taken to OR, placed supine on OR table, Operative Extremity prepped and draped in sterile fashion  Appropriate prophylactic preop abx administered w/o  Complications        Operative extremity exsanguinated with an esmarch which was used as a tourniquet  Time out performed with attending surgeon in room  Some hallux valgus interphalangeus present and therefore midaxial incision over medial MTP joint, SPN branch and EHL tendon protected, midaxial longitudinal capsulotomy performed, joint partially exposed to localize the osteotomy  With minimal periosteal stripping and nerve/tendon protected, oblique 1st prox phalanx osteotomy performed under fluoro guidance  Osteotomy with wedge resection, HVI corrected, osteotomy secured with small diameter nitinol staple under fluoro guidance  Satisfactory correction  Thorough irrigation w/ copious amounts of sterile saline  Capsule reapproximated and 1st MTP motion satisfactory  Alignment/correction satisfactory clinically and fluoroscopically  Intraop fluoro confirmed satisafactory correction  Next, attention was turned to the second hammertoe, it was rigid  An linear incision was made from the MTP joint distally to the  PIP joint  The EDL tendon was Z lengthening and the capsule of the MTP joint was completely released including the collaterals leaving the plantar plate intact  The plantar plate was intact and so did not need repair  We then performed a distal metatarsal shortening osteotomy using the 103 saw blade  We first protected the common digital nerves medial and lateral to the metatarsal with baby Candy retractors  We made our osteotomy parallel to the plantar surface of the foot  Using the Saw modification, we removed a wedge from the metatarsal neck and shortened the metatarsal to the appropriate length  Next, we entered the PIP joint and we debrided with a saw and rongeur until bleeding cancellous bone was seen on either side of the joint  The toe was then held into good position and a guidewire for the 2 5mm cannulated screw was placed across the toe  We confirmed the position of this toe and wire on fluoroscopy   Excellent position was obtained  We then measured and placed this screw into the toe to maintain the reduction  The tourniquet was released and satisfactory capillary refill noted in all toes  We repaired our Z lengthening at the appropriate new tension  Wounds closed in layers w/ vicryl for SQ and nylon for skin  Tensionless closures and no blanching of skin surrounding the incision  Sterile bunion type dressings for toes  Adequate padding over sterile wound dressings  Satisfactory capillary refill in toes and no pressure on incisions  Patient awakened from anesthesia w/o complication and taken to PACU in stable condition  PLAN:  1  Protected WB Darco boot  2  FU in ~ 3 weeks w/ splint removal, suture removal and bunion dressing       I was present for the entire procedure, A qualified resident physician was not available and A physician assistant was required during the procedure for retraction tissue handling,dissection and suturing    Patient Disposition:  PACU     SIGNATURE: Eloy Carrillo MD  DATE: September 27, 2021  TIME: 8:40 AM

## 2021-10-01 ENCOUNTER — TELEPHONE (OUTPATIENT)
Dept: FAMILY MEDICINE CLINIC | Facility: HOSPITAL | Age: 61
End: 2021-10-01

## 2021-10-01 ENCOUNTER — APPOINTMENT (EMERGENCY)
Dept: RADIOLOGY | Facility: HOSPITAL | Age: 61
End: 2021-10-01
Payer: MEDICARE

## 2021-10-01 ENCOUNTER — HOSPITAL ENCOUNTER (EMERGENCY)
Facility: HOSPITAL | Age: 61
Discharge: HOME | End: 2021-10-01
Attending: EMERGENCY MEDICINE
Payer: MEDICARE

## 2021-10-01 VITALS
WEIGHT: 215 LBS | DIASTOLIC BLOOD PRESSURE: 74 MMHG | TEMPERATURE: 97.6 F | RESPIRATION RATE: 17 BRPM | SYSTOLIC BLOOD PRESSURE: 142 MMHG | HEART RATE: 70 BPM | BODY MASS INDEX: 33.67 KG/M2 | OXYGEN SATURATION: 98 %

## 2021-10-01 DIAGNOSIS — S52.501A CLOSED FRACTURE OF DISTAL END OF RIGHT RADIUS: ICD-10-CM
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
## 2021-10-01 PROCEDURE — 99283 EMERGENCY DEPT VISIT LOW MDM: CPT

## 2021-10-01 PROCEDURE — 73110 X-RAY EXAM OF WRIST: CPT

## 2021-10-01 PROCEDURE — 99284 EMERGENCY DEPT VISIT MOD MDM: CPT | Performed by: PHYSICIAN ASSISTANT

## 2021-10-01 PROCEDURE — 73080 X-RAY EXAM OF ELBOW: CPT

## 2021-10-01 PROCEDURE — 29125 APPL SHORT ARM SPLINT STATIC: CPT | Performed by: PHYSICIAN ASSISTANT

## 2021-10-01 PROCEDURE — 73100 X-RAY EXAM OF WRIST: CPT

## 2021-10-01 RX ORDER — LIDOCAINE HYDROCHLORIDE 10 MG/ML
10 INJECTION, SOLUTION EPIDURAL; INFILTRATION; INTRACAUDAL; PERINEURAL ONCE
Status: COMPLETED | OUTPATIENT
Start: 2021-10-01 | End: 2021-10-01

## 2021-10-01 RX ADMIN — LIDOCAINE HYDROCHLORIDE 10 ML: 10 INJECTION, SOLUTION EPIDURAL; INFILTRATION; INTRACAUDAL; PERINEURAL at 10:20

## 2021-10-05 ENCOUNTER — OFFICE VISIT (OUTPATIENT)
Dept: OBGYN CLINIC | Facility: CLINIC | Age: 61
End: 2021-10-05
Payer: MEDICARE

## 2021-10-05 ENCOUNTER — APPOINTMENT (OUTPATIENT)
Dept: RADIOLOGY | Facility: CLINIC | Age: 61
End: 2021-10-05
Payer: MEDICARE

## 2021-10-05 VITALS — BODY MASS INDEX: 34.53 KG/M2 | HEIGHT: 67 IN | WEIGHT: 220 LBS

## 2021-10-05 DIAGNOSIS — S52.531A CLOSED COLLES' FRACTURE OF RIGHT RADIUS, INITIAL ENCOUNTER: ICD-10-CM

## 2021-10-05 DIAGNOSIS — S52.531A CLOSED COLLES' FRACTURE OF RIGHT RADIUS, INITIAL ENCOUNTER: Primary | ICD-10-CM

## 2021-10-05 PROCEDURE — 25600 CLTX DST RDL FX/EPHYS SEP WO: CPT | Performed by: ORTHOPAEDIC SURGERY

## 2021-10-05 PROCEDURE — 73110 X-RAY EXAM OF WRIST: CPT

## 2021-10-05 PROCEDURE — 99213 OFFICE O/P EST LOW 20 MIN: CPT | Performed by: ORTHOPAEDIC SURGERY

## 2021-10-05 RX ORDER — OXYCODONE HYDROCHLORIDE 5 MG/1
5 TABLET ORAL EVERY 4 HOURS PRN
Qty: 10 TABLET | Refills: 0 | Status: SHIPPED | OUTPATIENT
Start: 2021-10-05 | End: 2021-10-11 | Stop reason: HOSPADM

## 2021-10-06 ENCOUNTER — TELEPHONE (OUTPATIENT)
Dept: OBGYN CLINIC | Facility: HOSPITAL | Age: 61
End: 2021-10-06

## 2021-10-07 ENCOUNTER — TELEPHONE (OUTPATIENT)
Dept: OBGYN CLINIC | Facility: CLINIC | Age: 61
End: 2021-10-07

## 2021-10-07 ENCOUNTER — OFFICE VISIT (OUTPATIENT)
Dept: OBGYN CLINIC | Facility: CLINIC | Age: 61
End: 2021-10-07

## 2021-10-07 VITALS — HEIGHT: 67 IN | BODY MASS INDEX: 34.53 KG/M2 | WEIGHT: 220 LBS

## 2021-10-07 DIAGNOSIS — S52.531A CLOSED COLLES' FRACTURE OF RIGHT RADIUS, INITIAL ENCOUNTER: Primary | ICD-10-CM

## 2021-10-07 PROCEDURE — 99024 POSTOP FOLLOW-UP VISIT: CPT | Performed by: ORTHOPAEDIC SURGERY

## 2021-10-07 RX ORDER — CEFAZOLIN SODIUM 2 G/50ML
2000 SOLUTION INTRAVENOUS ONCE
Status: CANCELLED | OUTPATIENT
Start: 2021-10-11 | End: 2021-10-07

## 2021-10-07 RX ORDER — SODIUM CHLORIDE, SODIUM LACTATE, POTASSIUM CHLORIDE, CALCIUM CHLORIDE 600; 310; 30; 20 MG/100ML; MG/100ML; MG/100ML; MG/100ML
75 INJECTION, SOLUTION INTRAVENOUS CONTINUOUS
Status: CANCELLED | OUTPATIENT
Start: 2021-10-11

## 2021-10-08 ENCOUNTER — ANESTHESIA EVENT (OUTPATIENT)
Dept: PERIOP | Facility: HOSPITAL | Age: 61
End: 2021-10-08
Payer: MEDICARE

## 2021-10-11 ENCOUNTER — ANESTHESIA (OUTPATIENT)
Dept: PERIOP | Facility: HOSPITAL | Age: 61
End: 2021-10-11
Payer: MEDICARE

## 2021-10-11 ENCOUNTER — HOSPITAL ENCOUNTER (OUTPATIENT)
Facility: HOSPITAL | Age: 61
Setting detail: OUTPATIENT SURGERY
Discharge: HOME | End: 2021-10-11
Attending: ORTHOPAEDIC SURGERY | Admitting: ORTHOPAEDIC SURGERY
Payer: MEDICARE

## 2021-10-11 ENCOUNTER — APPOINTMENT (OUTPATIENT)
Dept: RADIOLOGY | Facility: HOSPITAL | Age: 61
End: 2021-10-11
Payer: MEDICARE

## 2021-10-11 VITALS
BODY MASS INDEX: 34.69 KG/M2 | HEART RATE: 88 BPM | SYSTOLIC BLOOD PRESSURE: 185 MMHG | WEIGHT: 221 LBS | TEMPERATURE: 99 F | RESPIRATION RATE: 20 BRPM | HEIGHT: 67 IN | OXYGEN SATURATION: 98 % | DIASTOLIC BLOOD PRESSURE: 84 MMHG

## 2021-10-11 DIAGNOSIS — S52.531D CLOSED COLLES' FRACTURE OF RIGHT RADIUS WITH ROUTINE HEALING, SUBSEQUENT ENCOUNTER: Primary | ICD-10-CM

## 2021-10-11 LAB
ANION GAP SERPL CALCULATED.3IONS-SCNC: 13 MMOL/L (ref 4–13)
BUN SERPL-MCNC: 32 MG/DL (ref 5–25)
CALCIUM SERPL-MCNC: 8.9 MG/DL (ref 8.3–10.1)
CHLORIDE SERPL-SCNC: 108 MMOL/L (ref 100–108)
CO2 SERPL-SCNC: 20 MMOL/L (ref 21–32)
CREAT SERPL-MCNC: 2.31 MG/DL (ref 0.6–1.3)
GFR SERPL CREATININE-BSD FRML MDRD: 22 ML/MIN/1.73SQ M
GLUCOSE P FAST SERPL-MCNC: 115 MG/DL (ref 65–99)
GLUCOSE SERPL-MCNC: 115 MG/DL (ref 65–140)
POTASSIUM SERPL-SCNC: 4.5 MMOL/L (ref 3.5–5.3)
SODIUM SERPL-SCNC: 141 MMOL/L (ref 136–145)

## 2021-10-11 PROCEDURE — C1713 ANCHOR/SCREW BN/BN,TIS/BN: HCPCS | Performed by: ORTHOPAEDIC SURGERY

## 2021-10-11 PROCEDURE — 25607 OPTX DST RD XARTC FX/EPI SEP: CPT | Performed by: ORTHOPAEDIC SURGERY

## 2021-10-11 PROCEDURE — 25607 OPTX DST RD XARTC FX/EPI SEP: CPT | Performed by: PHYSICIAN ASSISTANT

## 2021-10-11 PROCEDURE — 73110 X-RAY EXAM OF WRIST: CPT

## 2021-10-11 PROCEDURE — 80048 BASIC METABOLIC PNL TOTAL CA: CPT | Performed by: STUDENT IN AN ORGANIZED HEALTH CARE EDUCATION/TRAINING PROGRAM

## 2021-10-11 DEVICE — 2.4MM LOCKING SCREW SLF-TPNG WITH STARDRIVE RECESS 20MM
Type: IMPLANTABLE DEVICE | Site: WRIST | Status: NON-FUNCTIONAL
Removed: 2022-06-23

## 2021-10-11 DEVICE — 2.4MM LOCKING SCREW SLF-TPNG WITH STARDRIVE RECESS 18MM
Type: IMPLANTABLE DEVICE | Site: WRIST | Status: NON-FUNCTIONAL
Removed: 2022-06-23

## 2021-10-11 DEVICE — 2.4MM LOCKING SCREW SLF-TPNG WITH STARDRIVE RECESS 22MM
Type: IMPLANTABLE DEVICE | Site: WRIST | Status: NON-FUNCTIONAL
Removed: 2022-06-23

## 2021-10-11 DEVICE — 2.7MM CORTEX SCREW SLF-TPNG WITH T8 STARDRIVE RECESS 14MM
Type: IMPLANTABLE DEVICE | Site: WRIST | Status: NON-FUNCTIONAL
Removed: 2022-06-23

## 2021-10-11 DEVICE — 2.4MM CORTEX SCREW SLF-TPNG WITH T8 STARDRIVE RECESS 16MM
Type: IMPLANTABLE DEVICE | Site: WRIST | Status: NON-FUNCTIONAL
Removed: 2022-06-23

## 2021-10-11 DEVICE — 2.7MM CORTEX SCREW SLF-TPNG WITH T8 STARDRIVE RECESS 16MM
Type: IMPLANTABLE DEVICE | Site: WRIST | Status: NON-FUNCTIONAL
Removed: 2022-06-23

## 2021-10-11 DEVICE — LCP VOLAR COLUMN DISTAL RADIUS PL 6H HEAD/3H SHAFT/RT
Type: IMPLANTABLE DEVICE | Site: WRIST | Status: NON-FUNCTIONAL
Brand: LCP
Removed: 2022-06-23

## 2021-10-11 RX ORDER — OXYCODONE HYDROCHLORIDE AND ACETAMINOPHEN 5; 325 MG/1; MG/1
1 TABLET ORAL EVERY 4 HOURS PRN
Qty: 20 TABLET | Refills: 0 | Status: SHIPPED | OUTPATIENT
Start: 2021-10-11 | End: 2021-10-25 | Stop reason: SDUPTHER

## 2021-10-11 RX ORDER — PROPOFOL 10 MG/ML
INJECTION, EMULSION INTRAVENOUS AS NEEDED
Status: DISCONTINUED | OUTPATIENT
Start: 2021-10-11 | End: 2021-10-11

## 2021-10-11 RX ORDER — PROPOFOL 10 MG/ML
INJECTION, EMULSION INTRAVENOUS CONTINUOUS PRN
Status: DISCONTINUED | OUTPATIENT
Start: 2021-10-11 | End: 2021-10-11

## 2021-10-11 RX ORDER — SODIUM CHLORIDE, SODIUM LACTATE, POTASSIUM CHLORIDE, CALCIUM CHLORIDE 600; 310; 30; 20 MG/100ML; MG/100ML; MG/100ML; MG/100ML
50 INJECTION, SOLUTION INTRAVENOUS CONTINUOUS
Status: DISCONTINUED | OUTPATIENT
Start: 2021-10-11 | End: 2021-10-11 | Stop reason: HOSPADM

## 2021-10-11 RX ORDER — ACETAMINOPHEN 325 MG/1
650 TABLET ORAL EVERY 6 HOURS PRN
Status: DISCONTINUED | OUTPATIENT
Start: 2021-10-11 | End: 2021-10-11 | Stop reason: HOSPADM

## 2021-10-11 RX ORDER — MIDAZOLAM HYDROCHLORIDE 2 MG/2ML
INJECTION, SOLUTION INTRAMUSCULAR; INTRAVENOUS
Status: COMPLETED | OUTPATIENT
Start: 2021-10-11 | End: 2021-10-11

## 2021-10-11 RX ORDER — SODIUM CHLORIDE, SODIUM LACTATE, POTASSIUM CHLORIDE, CALCIUM CHLORIDE 600; 310; 30; 20 MG/100ML; MG/100ML; MG/100ML; MG/100ML
75 INJECTION, SOLUTION INTRAVENOUS CONTINUOUS
Status: DISCONTINUED | OUTPATIENT
Start: 2021-10-11 | End: 2021-10-11 | Stop reason: HOSPADM

## 2021-10-11 RX ORDER — ALBUTEROL SULFATE 2.5 MG/3ML
2.5 SOLUTION RESPIRATORY (INHALATION) ONCE AS NEEDED
Status: DISCONTINUED | OUTPATIENT
Start: 2021-10-11 | End: 2021-10-11 | Stop reason: HOSPADM

## 2021-10-11 RX ORDER — LIDOCAINE HYDROCHLORIDE 10 MG/ML
INJECTION, SOLUTION EPIDURAL; INFILTRATION; INTRACAUDAL; PERINEURAL AS NEEDED
Status: DISCONTINUED | OUTPATIENT
Start: 2021-10-11 | End: 2021-10-11

## 2021-10-11 RX ORDER — HYDROMORPHONE HCL IN WATER/PF 6 MG/30 ML
0.2 PATIENT CONTROLLED ANALGESIA SYRINGE INTRAVENOUS
Status: DISCONTINUED | OUTPATIENT
Start: 2021-10-11 | End: 2021-10-11 | Stop reason: HOSPADM

## 2021-10-11 RX ORDER — DEXAMETHASONE SODIUM PHOSPHATE 10 MG/ML
INJECTION, SOLUTION INTRAMUSCULAR; INTRAVENOUS AS NEEDED
Status: DISCONTINUED | OUTPATIENT
Start: 2021-10-11 | End: 2021-10-11

## 2021-10-11 RX ORDER — LIDOCAINE HYDROCHLORIDE 10 MG/ML
INJECTION, SOLUTION EPIDURAL; INFILTRATION; INTRACAUDAL; PERINEURAL
Status: COMPLETED | OUTPATIENT
Start: 2021-10-11 | End: 2021-10-11

## 2021-10-11 RX ORDER — ONDANSETRON 2 MG/ML
4 INJECTION INTRAMUSCULAR; INTRAVENOUS EVERY 6 HOURS PRN
Status: DISCONTINUED | OUTPATIENT
Start: 2021-10-11 | End: 2021-10-11 | Stop reason: HOSPADM

## 2021-10-11 RX ORDER — ONDANSETRON 2 MG/ML
INJECTION INTRAMUSCULAR; INTRAVENOUS AS NEEDED
Status: DISCONTINUED | OUTPATIENT
Start: 2021-10-11 | End: 2021-10-11

## 2021-10-11 RX ORDER — METOCLOPRAMIDE HYDROCHLORIDE 5 MG/ML
10 INJECTION INTRAMUSCULAR; INTRAVENOUS ONCE AS NEEDED
Status: DISCONTINUED | OUTPATIENT
Start: 2021-10-11 | End: 2021-10-11 | Stop reason: HOSPADM

## 2021-10-11 RX ORDER — LIDOCAINE HYDROCHLORIDE 10 MG/ML
0.5 INJECTION, SOLUTION EPIDURAL; INFILTRATION; INTRACAUDAL; PERINEURAL ONCE AS NEEDED
Status: DISCONTINUED | OUTPATIENT
Start: 2021-10-11 | End: 2021-10-11 | Stop reason: HOSPADM

## 2021-10-11 RX ORDER — FENTANYL CITRATE/PF 50 MCG/ML
25 SYRINGE (ML) INJECTION
Status: DISCONTINUED | OUTPATIENT
Start: 2021-10-11 | End: 2021-10-11 | Stop reason: HOSPADM

## 2021-10-11 RX ORDER — MEPERIDINE HYDROCHLORIDE 25 MG/ML
12.5 INJECTION INTRAMUSCULAR; INTRAVENOUS; SUBCUTANEOUS
Status: DISCONTINUED | OUTPATIENT
Start: 2021-10-11 | End: 2021-10-11 | Stop reason: HOSPADM

## 2021-10-11 RX ORDER — ROPIVACAINE HYDROCHLORIDE 5 MG/ML
INJECTION, SOLUTION EPIDURAL; INFILTRATION; PERINEURAL
Status: COMPLETED | OUTPATIENT
Start: 2021-10-11 | End: 2021-10-11

## 2021-10-11 RX ORDER — OXYCODONE HYDROCHLORIDE AND ACETAMINOPHEN 5; 325 MG/1; MG/1
1 TABLET ORAL EVERY 4 HOURS PRN
Status: DISCONTINUED | OUTPATIENT
Start: 2021-10-11 | End: 2021-10-11 | Stop reason: HOSPADM

## 2021-10-11 RX ORDER — CEFAZOLIN SODIUM 2 G/50ML
2000 SOLUTION INTRAVENOUS ONCE
Status: COMPLETED | OUTPATIENT
Start: 2021-10-11 | End: 2021-10-11

## 2021-10-11 RX ADMIN — ONDANSETRON 4 MG: 2 INJECTION INTRAMUSCULAR; INTRAVENOUS at 08:12

## 2021-10-11 RX ADMIN — SODIUM CHLORIDE, SODIUM LACTATE, POTASSIUM CHLORIDE, AND CALCIUM CHLORIDE: .6; .31; .03; .02 INJECTION, SOLUTION INTRAVENOUS at 08:00

## 2021-10-11 RX ADMIN — ONDANSETRON 4 MG: 2 INJECTION INTRAMUSCULAR; INTRAVENOUS at 10:03

## 2021-10-11 RX ADMIN — PROPOFOL 150 MG: 10 INJECTION, EMULSION INTRAVENOUS at 08:05

## 2021-10-11 RX ADMIN — DEXAMETHASONE SODIUM PHOSPHATE 4 MG: 10 INJECTION, SOLUTION INTRAMUSCULAR; INTRAVENOUS at 08:12

## 2021-10-11 RX ADMIN — LIDOCAINE HYDROCHLORIDE 3 ML: 10 INJECTION, SOLUTION EPIDURAL; INFILTRATION; INTRACAUDAL; PERINEURAL at 07:55

## 2021-10-11 RX ADMIN — MIDAZOLAM 4 MG: 1 INJECTION INTRAMUSCULAR; INTRAVENOUS at 07:55

## 2021-10-11 RX ADMIN — LIDOCAINE HYDROCHLORIDE 50 MG: 10 INJECTION, SOLUTION EPIDURAL; INFILTRATION; INTRACAUDAL; PERINEURAL at 08:05

## 2021-10-11 RX ADMIN — PROPOFOL 120 MCG/KG/MIN: 10 INJECTION, EMULSION INTRAVENOUS at 08:05

## 2021-10-11 RX ADMIN — ROPIVACAINE HYDROCHLORIDE 30 ML: 5 INJECTION, SOLUTION EPIDURAL; INFILTRATION; PERINEURAL at 07:55

## 2021-10-11 RX ADMIN — CEFAZOLIN SODIUM 2000 MG: 2 SOLUTION INTRAVENOUS at 07:59

## 2021-10-11 RX ADMIN — FENTANYL CITRATE 25 MCG: 50 INJECTION, SOLUTION INTRAMUSCULAR; INTRAVENOUS at 10:52

## 2021-10-11 RX ADMIN — FENTANYL CITRATE 25 MCG: 50 INJECTION, SOLUTION INTRAMUSCULAR; INTRAVENOUS at 10:44

## 2021-10-15 DIAGNOSIS — K59.00 CONSTIPATION, UNSPECIFIED CONSTIPATION TYPE: ICD-10-CM

## 2021-10-15 RX ORDER — LINACLOTIDE 290 UG/1
290 CAPSULE, GELATIN COATED ORAL DAILY
Qty: 90 CAPSULE | Refills: 0 | Status: SHIPPED | OUTPATIENT
Start: 2021-10-15 | End: 2022-08-01

## 2021-10-18 DIAGNOSIS — S52.531D CLOSED COLLES' FRACTURE OF RIGHT RADIUS WITH ROUTINE HEALING, SUBSEQUENT ENCOUNTER: ICD-10-CM

## 2021-10-20 ENCOUNTER — TELEPHONE (OUTPATIENT)
Dept: GASTROENTEROLOGY | Facility: CLINIC | Age: 61
End: 2021-10-20

## 2021-10-21 ENCOUNTER — OFFICE VISIT (OUTPATIENT)
Dept: OBGYN CLINIC | Facility: CLINIC | Age: 61
End: 2021-10-21

## 2021-10-21 VITALS — BODY MASS INDEX: 34.69 KG/M2 | HEIGHT: 67 IN | WEIGHT: 221 LBS

## 2021-10-21 DIAGNOSIS — M20.5X1 CLAW TOE, ACQUIRED, RIGHT: ICD-10-CM

## 2021-10-21 DIAGNOSIS — M20.11 ACQUIRED HALLUX INTERPHALANGEUS OF RIGHT FOOT: Primary | ICD-10-CM

## 2021-10-21 PROCEDURE — 99024 POSTOP FOLLOW-UP VISIT: CPT | Performed by: ORTHOPAEDIC SURGERY

## 2021-10-25 RX ORDER — OXYCODONE HYDROCHLORIDE AND ACETAMINOPHEN 5; 325 MG/1; MG/1
1 TABLET ORAL EVERY 4 HOURS PRN
Qty: 20 TABLET | Refills: 0 | Status: SHIPPED | OUTPATIENT
Start: 2021-10-25 | End: 2021-11-04

## 2021-10-27 ENCOUNTER — OFFICE VISIT (OUTPATIENT)
Dept: OBGYN CLINIC | Facility: CLINIC | Age: 61
End: 2021-10-27

## 2021-10-27 ENCOUNTER — APPOINTMENT (OUTPATIENT)
Dept: RADIOLOGY | Facility: CLINIC | Age: 61
End: 2021-10-27
Payer: MEDICARE

## 2021-10-27 ENCOUNTER — OFFICE VISIT (OUTPATIENT)
Dept: OCCUPATIONAL THERAPY | Facility: CLINIC | Age: 61
End: 2021-10-27
Payer: MEDICARE

## 2021-10-27 VITALS — BODY MASS INDEX: 34.69 KG/M2 | WEIGHT: 221 LBS | HEIGHT: 67 IN

## 2021-10-27 DIAGNOSIS — Z47.89 AFTERCARE FOLLOWING SURGERY OF THE MUSCULOSKELETAL SYSTEM: ICD-10-CM

## 2021-10-27 DIAGNOSIS — M25.531 RIGHT WRIST PAIN: Primary | ICD-10-CM

## 2021-10-27 DIAGNOSIS — Z47.89 AFTERCARE FOLLOWING SURGERY OF THE MUSCULOSKELETAL SYSTEM: Primary | ICD-10-CM

## 2021-10-27 PROCEDURE — 97760 ORTHOTIC MGMT&TRAING 1ST ENC: CPT | Performed by: OCCUPATIONAL THERAPIST

## 2021-10-27 PROCEDURE — 99024 POSTOP FOLLOW-UP VISIT: CPT | Performed by: ORTHOPAEDIC SURGERY

## 2021-10-27 PROCEDURE — 73110 X-RAY EXAM OF WRIST: CPT

## 2021-10-28 ENCOUNTER — TELEPHONE (OUTPATIENT)
Dept: OBGYN CLINIC | Facility: OTHER | Age: 61
End: 2021-10-28

## 2021-10-28 DIAGNOSIS — F31.81 BIPOLAR 2 DISORDER (HCC): Chronic | ICD-10-CM

## 2021-10-28 DIAGNOSIS — R79.89 ELEVATED LFTS: ICD-10-CM

## 2021-10-29 RX ORDER — ATORVASTATIN CALCIUM 40 MG/1
40 TABLET, FILM COATED ORAL DAILY
Qty: 90 TABLET | Refills: 3 | Status: SHIPPED | OUTPATIENT
Start: 2021-10-29 | End: 2021-12-27 | Stop reason: ALTCHOICE

## 2021-10-29 RX ORDER — CLONAZEPAM 0.5 MG/1
0.5 TABLET ORAL 3 TIMES DAILY
Qty: 270 TABLET | Refills: 0 | Status: SHIPPED | OUTPATIENT
Start: 2021-10-29 | End: 2022-01-20 | Stop reason: SDUPTHER

## 2021-11-03 ENCOUNTER — TELEPHONE (OUTPATIENT)
Dept: OBGYN CLINIC | Facility: HOSPITAL | Age: 61
End: 2021-11-03

## 2021-11-04 ENCOUNTER — EVALUATION (OUTPATIENT)
Dept: OCCUPATIONAL THERAPY | Facility: CLINIC | Age: 61
End: 2021-11-04
Payer: MEDICARE

## 2021-11-04 DIAGNOSIS — S52.531A CLOSED COLLES' FRACTURE OF RIGHT RADIUS, INITIAL ENCOUNTER: Primary | ICD-10-CM

## 2021-11-04 DIAGNOSIS — Z47.89 AFTERCARE FOLLOWING SURGERY OF THE MUSCULOSKELETAL SYSTEM: ICD-10-CM

## 2021-11-04 PROCEDURE — 97140 MANUAL THERAPY 1/> REGIONS: CPT | Performed by: OCCUPATIONAL THERAPIST

## 2021-11-04 PROCEDURE — 97165 OT EVAL LOW COMPLEX 30 MIN: CPT | Performed by: OCCUPATIONAL THERAPIST

## 2021-11-08 ENCOUNTER — OFFICE VISIT (OUTPATIENT)
Dept: OCCUPATIONAL THERAPY | Facility: CLINIC | Age: 61
End: 2021-11-08
Payer: MEDICARE

## 2021-11-08 DIAGNOSIS — Z47.89 AFTERCARE FOLLOWING SURGERY OF THE MUSCULOSKELETAL SYSTEM: ICD-10-CM

## 2021-11-08 DIAGNOSIS — S52.531A CLOSED COLLES' FRACTURE OF RIGHT RADIUS, INITIAL ENCOUNTER: Primary | ICD-10-CM

## 2021-11-08 PROCEDURE — 97110 THERAPEUTIC EXERCISES: CPT | Performed by: OCCUPATIONAL THERAPIST

## 2021-11-08 PROCEDURE — 97140 MANUAL THERAPY 1/> REGIONS: CPT | Performed by: OCCUPATIONAL THERAPIST

## 2021-11-09 ENCOUNTER — APPOINTMENT (EMERGENCY)
Dept: RADIOLOGY | Facility: HOSPITAL | Age: 61
End: 2021-11-09
Payer: MEDICARE

## 2021-11-09 ENCOUNTER — HOSPITAL ENCOUNTER (EMERGENCY)
Facility: HOSPITAL | Age: 61
Discharge: HOME | End: 2021-11-09
Attending: EMERGENCY MEDICINE | Admitting: EMERGENCY MEDICINE
Payer: MEDICARE

## 2021-11-09 VITALS
OXYGEN SATURATION: 97 % | DIASTOLIC BLOOD PRESSURE: 86 MMHG | WEIGHT: 216.05 LBS | BODY MASS INDEX: 33.91 KG/M2 | HEIGHT: 67 IN | SYSTOLIC BLOOD PRESSURE: 183 MMHG | RESPIRATION RATE: 18 BRPM | TEMPERATURE: 98.2 F | HEART RATE: 99 BPM
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
## 2021-11-09 DIAGNOSIS — S60.212A CONTUSION OF LEFT WRIST, INITIAL ENCOUNTER: ICD-10-CM

## 2021-11-09 DIAGNOSIS — R07.81 RIB PAIN ON LEFT SIDE: ICD-10-CM

## 2021-11-09 PROCEDURE — 73090 X-RAY EXAM OF FOREARM: CPT

## 2021-11-09 PROCEDURE — 99284 EMERGENCY DEPT VISIT MOD MDM: CPT | Performed by: EMERGENCY MEDICINE

## 2021-11-09 PROCEDURE — 73110 X-RAY EXAM OF WRIST: CPT

## 2021-11-09 PROCEDURE — 99283 EMERGENCY DEPT VISIT LOW MDM: CPT

## 2021-11-10 ENCOUNTER — TELEPHONE (OUTPATIENT)
Dept: NEPHROLOGY | Facility: CLINIC | Age: 61
End: 2021-11-10

## 2021-11-11 ENCOUNTER — APPOINTMENT (OUTPATIENT)
Dept: OCCUPATIONAL THERAPY | Facility: CLINIC | Age: 61
End: 2021-11-11
Payer: MEDICARE

## 2021-11-12 ENCOUNTER — TELEPHONE (OUTPATIENT)
Dept: PULMONOLOGY | Facility: CLINIC | Age: 61
End: 2021-11-12

## 2021-11-12 ENCOUNTER — APPOINTMENT (OUTPATIENT)
Dept: RADIOLOGY | Facility: CLINIC | Age: 61
End: 2021-11-12
Payer: MEDICARE

## 2021-11-12 ENCOUNTER — OFFICE VISIT (OUTPATIENT)
Dept: OBGYN CLINIC | Facility: CLINIC | Age: 61
End: 2021-11-12

## 2021-11-12 DIAGNOSIS — M20.11 ACQUIRED HALLUX INTERPHALANGEUS OF RIGHT FOOT: Primary | ICD-10-CM

## 2021-11-12 DIAGNOSIS — M20.11 ACQUIRED HALLUX INTERPHALANGEUS OF RIGHT FOOT: ICD-10-CM

## 2021-11-12 PROCEDURE — 73630 X-RAY EXAM OF FOOT: CPT

## 2021-11-12 PROCEDURE — 99024 POSTOP FOLLOW-UP VISIT: CPT | Performed by: ORTHOPAEDIC SURGERY

## 2021-11-15 ENCOUNTER — APPOINTMENT (OUTPATIENT)
Dept: OCCUPATIONAL THERAPY | Facility: CLINIC | Age: 61
End: 2021-11-15
Payer: MEDICARE

## 2021-11-17 ENCOUNTER — APPOINTMENT (OUTPATIENT)
Dept: RADIOLOGY | Facility: CLINIC | Age: 61
End: 2021-11-17
Payer: MEDICARE

## 2021-11-17 ENCOUNTER — OFFICE VISIT (OUTPATIENT)
Dept: OBGYN CLINIC | Facility: CLINIC | Age: 61
End: 2021-11-17

## 2021-11-17 VITALS — DIASTOLIC BLOOD PRESSURE: 86 MMHG | SYSTOLIC BLOOD PRESSURE: 160 MMHG

## 2021-11-17 DIAGNOSIS — Z47.89 AFTERCARE FOLLOWING SURGERY OF THE MUSCULOSKELETAL SYSTEM: ICD-10-CM

## 2021-11-17 DIAGNOSIS — Z47.89 AFTERCARE FOLLOWING SURGERY OF THE MUSCULOSKELETAL SYSTEM: Primary | ICD-10-CM

## 2021-11-17 PROCEDURE — 99024 POSTOP FOLLOW-UP VISIT: CPT | Performed by: ORTHOPAEDIC SURGERY

## 2021-11-17 PROCEDURE — 73110 X-RAY EXAM OF WRIST: CPT

## 2021-11-18 ENCOUNTER — OFFICE VISIT (OUTPATIENT)
Dept: OCCUPATIONAL THERAPY | Facility: CLINIC | Age: 61
End: 2021-11-18
Payer: MEDICARE

## 2021-11-18 ENCOUNTER — TELEPHONE (OUTPATIENT)
Dept: FAMILY MEDICINE CLINIC | Facility: HOSPITAL | Age: 61
End: 2021-11-18

## 2021-11-18 DIAGNOSIS — Z47.89 AFTERCARE FOLLOWING SURGERY OF THE MUSCULOSKELETAL SYSTEM: ICD-10-CM

## 2021-11-18 DIAGNOSIS — S52.531A CLOSED COLLES' FRACTURE OF RIGHT RADIUS, INITIAL ENCOUNTER: Primary | ICD-10-CM

## 2021-11-18 DIAGNOSIS — R26.89 BALANCE DISORDER: Primary | ICD-10-CM

## 2021-11-18 PROCEDURE — 97140 MANUAL THERAPY 1/> REGIONS: CPT | Performed by: OCCUPATIONAL THERAPIST

## 2021-11-18 PROCEDURE — 97110 THERAPEUTIC EXERCISES: CPT | Performed by: OCCUPATIONAL THERAPIST

## 2021-11-19 ENCOUNTER — TELEPHONE (OUTPATIENT)
Dept: OBGYN CLINIC | Facility: MEDICAL CENTER | Age: 61
End: 2021-11-19

## 2021-11-22 ENCOUNTER — TELEPHONE (OUTPATIENT)
Dept: OTHER | Facility: OTHER | Age: 61
End: 2021-11-22

## 2021-11-22 ENCOUNTER — OFFICE VISIT (OUTPATIENT)
Dept: OCCUPATIONAL THERAPY | Facility: CLINIC | Age: 61
End: 2021-11-22
Payer: MEDICARE

## 2021-11-22 DIAGNOSIS — Z47.89 AFTERCARE FOLLOWING SURGERY OF THE MUSCULOSKELETAL SYSTEM: ICD-10-CM

## 2021-11-22 DIAGNOSIS — S52.531A CLOSED COLLES' FRACTURE OF RIGHT RADIUS, INITIAL ENCOUNTER: Primary | ICD-10-CM

## 2021-11-22 DIAGNOSIS — M25.531 RIGHT WRIST PAIN: ICD-10-CM

## 2021-11-22 PROCEDURE — 97110 THERAPEUTIC EXERCISES: CPT | Performed by: OCCUPATIONAL THERAPIST

## 2021-11-22 PROCEDURE — 97140 MANUAL THERAPY 1/> REGIONS: CPT | Performed by: OCCUPATIONAL THERAPIST

## 2021-11-24 ENCOUNTER — OFFICE VISIT (OUTPATIENT)
Dept: OCCUPATIONAL THERAPY | Facility: CLINIC | Age: 61
End: 2021-11-24
Payer: MEDICARE

## 2021-11-24 ENCOUNTER — EVALUATION (OUTPATIENT)
Dept: PHYSICAL THERAPY | Facility: CLINIC | Age: 61
End: 2021-11-24
Payer: MEDICARE

## 2021-11-24 DIAGNOSIS — S52.531A CLOSED COLLES' FRACTURE OF RIGHT RADIUS, INITIAL ENCOUNTER: Primary | ICD-10-CM

## 2021-11-24 DIAGNOSIS — K21.9 GASTROESOPHAGEAL REFLUX DISEASE, UNSPECIFIED WHETHER ESOPHAGITIS PRESENT: Primary | ICD-10-CM

## 2021-11-24 DIAGNOSIS — Z47.89 AFTERCARE FOLLOWING SURGERY OF THE MUSCULOSKELETAL SYSTEM: ICD-10-CM

## 2021-11-24 DIAGNOSIS — R26.89 BALANCE DISORDER: Primary | ICD-10-CM

## 2021-11-24 PROCEDURE — 97140 MANUAL THERAPY 1/> REGIONS: CPT | Performed by: OCCUPATIONAL THERAPIST

## 2021-11-24 PROCEDURE — 97110 THERAPEUTIC EXERCISES: CPT | Performed by: OCCUPATIONAL THERAPIST

## 2021-11-24 PROCEDURE — 97163 PT EVAL HIGH COMPLEX 45 MIN: CPT

## 2021-11-24 RX ORDER — OMEPRAZOLE 40 MG/1
40 CAPSULE, DELAYED RELEASE ORAL
Qty: 90 CAPSULE | Refills: 3 | Status: SHIPPED | OUTPATIENT
Start: 2021-11-24 | End: 2022-03-22 | Stop reason: SDUPTHER

## 2021-11-30 ENCOUNTER — APPOINTMENT (OUTPATIENT)
Dept: PHYSICAL THERAPY | Facility: CLINIC | Age: 61
End: 2021-11-30
Payer: MEDICARE

## 2021-11-30 ENCOUNTER — APPOINTMENT (OUTPATIENT)
Dept: OCCUPATIONAL THERAPY | Facility: CLINIC | Age: 61
End: 2021-11-30
Payer: MEDICARE

## 2021-12-01 ENCOUNTER — OFFICE VISIT (OUTPATIENT)
Dept: OCCUPATIONAL THERAPY | Facility: CLINIC | Age: 61
End: 2021-12-01
Payer: MEDICARE

## 2021-12-01 ENCOUNTER — TELEPHONE (OUTPATIENT)
Dept: GASTROENTEROLOGY | Facility: CLINIC | Age: 61
End: 2021-12-01

## 2021-12-01 ENCOUNTER — OFFICE VISIT (OUTPATIENT)
Dept: PHYSICAL THERAPY | Facility: CLINIC | Age: 61
End: 2021-12-01
Payer: MEDICARE

## 2021-12-01 DIAGNOSIS — S52.531A CLOSED COLLES' FRACTURE OF RIGHT RADIUS, INITIAL ENCOUNTER: Primary | ICD-10-CM

## 2021-12-01 DIAGNOSIS — R26.89 BALANCE DISORDER: Primary | ICD-10-CM

## 2021-12-01 PROCEDURE — 97110 THERAPEUTIC EXERCISES: CPT

## 2021-12-01 PROCEDURE — 97112 NEUROMUSCULAR REEDUCATION: CPT

## 2021-12-01 PROCEDURE — 97110 THERAPEUTIC EXERCISES: CPT | Performed by: OCCUPATIONAL THERAPIST

## 2021-12-01 PROCEDURE — 97140 MANUAL THERAPY 1/> REGIONS: CPT | Performed by: OCCUPATIONAL THERAPIST

## 2021-12-01 PROCEDURE — 97530 THERAPEUTIC ACTIVITIES: CPT | Performed by: OCCUPATIONAL THERAPIST

## 2021-12-06 ENCOUNTER — OFFICE VISIT (OUTPATIENT)
Dept: OCCUPATIONAL THERAPY | Facility: CLINIC | Age: 61
End: 2021-12-06
Payer: MEDICARE

## 2021-12-06 ENCOUNTER — OFFICE VISIT (OUTPATIENT)
Dept: PHYSICAL THERAPY | Facility: CLINIC | Age: 61
End: 2021-12-06
Payer: MEDICARE

## 2021-12-06 DIAGNOSIS — Z47.89 AFTERCARE FOLLOWING SURGERY OF THE MUSCULOSKELETAL SYSTEM: ICD-10-CM

## 2021-12-06 DIAGNOSIS — S52.531A CLOSED COLLES' FRACTURE OF RIGHT RADIUS, INITIAL ENCOUNTER: Primary | ICD-10-CM

## 2021-12-06 DIAGNOSIS — R26.89 BALANCE DISORDER: Primary | ICD-10-CM

## 2021-12-06 PROCEDURE — 97110 THERAPEUTIC EXERCISES: CPT | Performed by: OCCUPATIONAL THERAPIST

## 2021-12-06 PROCEDURE — 97140 MANUAL THERAPY 1/> REGIONS: CPT | Performed by: OCCUPATIONAL THERAPIST

## 2021-12-06 PROCEDURE — 97112 NEUROMUSCULAR REEDUCATION: CPT

## 2021-12-08 ENCOUNTER — OFFICE VISIT (OUTPATIENT)
Dept: OCCUPATIONAL THERAPY | Facility: CLINIC | Age: 61
End: 2021-12-08
Payer: MEDICARE

## 2021-12-08 ENCOUNTER — OFFICE VISIT (OUTPATIENT)
Dept: PHYSICAL THERAPY | Facility: CLINIC | Age: 61
End: 2021-12-08
Payer: MEDICARE

## 2021-12-08 DIAGNOSIS — R26.89 BALANCE DISORDER: Primary | ICD-10-CM

## 2021-12-08 DIAGNOSIS — S52.531A CLOSED COLLES' FRACTURE OF RIGHT RADIUS, INITIAL ENCOUNTER: Primary | ICD-10-CM

## 2021-12-08 DIAGNOSIS — Z47.89 AFTERCARE FOLLOWING SURGERY OF THE MUSCULOSKELETAL SYSTEM: ICD-10-CM

## 2021-12-08 PROCEDURE — 97140 MANUAL THERAPY 1/> REGIONS: CPT | Performed by: OCCUPATIONAL THERAPIST

## 2021-12-08 PROCEDURE — 97112 NEUROMUSCULAR REEDUCATION: CPT

## 2021-12-08 PROCEDURE — 97110 THERAPEUTIC EXERCISES: CPT | Performed by: OCCUPATIONAL THERAPIST

## 2021-12-08 PROCEDURE — 97110 THERAPEUTIC EXERCISES: CPT

## 2021-12-09 DIAGNOSIS — M81.0 AGE-RELATED OSTEOPOROSIS WITHOUT CURRENT PATHOLOGICAL FRACTURE: ICD-10-CM

## 2021-12-10 ENCOUNTER — TELEPHONE (OUTPATIENT)
Dept: FAMILY MEDICINE CLINIC | Facility: HOSPITAL | Age: 61
End: 2021-12-10

## 2021-12-10 RX ORDER — ALENDRONATE SODIUM 70 MG/1
70 TABLET ORAL
Qty: 4 TABLET | Refills: 5 | Status: SHIPPED | OUTPATIENT
Start: 2021-12-10 | End: 2021-12-28

## 2021-12-13 ENCOUNTER — OFFICE VISIT (OUTPATIENT)
Dept: OCCUPATIONAL THERAPY | Facility: CLINIC | Age: 61
End: 2021-12-13
Payer: MEDICARE

## 2021-12-13 ENCOUNTER — OFFICE VISIT (OUTPATIENT)
Dept: PHYSICAL THERAPY | Facility: CLINIC | Age: 61
End: 2021-12-13
Payer: MEDICARE

## 2021-12-13 ENCOUNTER — TELEPHONE (OUTPATIENT)
Dept: GASTROENTEROLOGY | Facility: CLINIC | Age: 61
End: 2021-12-13

## 2021-12-13 DIAGNOSIS — R26.89 BALANCE DISORDER: Primary | ICD-10-CM

## 2021-12-13 DIAGNOSIS — S52.531A CLOSED COLLES' FRACTURE OF RIGHT RADIUS, INITIAL ENCOUNTER: Primary | ICD-10-CM

## 2021-12-13 DIAGNOSIS — Z47.89 AFTERCARE FOLLOWING SURGERY OF THE MUSCULOSKELETAL SYSTEM: ICD-10-CM

## 2021-12-13 PROCEDURE — 97116 GAIT TRAINING THERAPY: CPT

## 2021-12-13 PROCEDURE — 97140 MANUAL THERAPY 1/> REGIONS: CPT | Performed by: OCCUPATIONAL THERAPIST

## 2021-12-13 PROCEDURE — 97112 NEUROMUSCULAR REEDUCATION: CPT

## 2021-12-13 PROCEDURE — 97110 THERAPEUTIC EXERCISES: CPT | Performed by: OCCUPATIONAL THERAPIST

## 2021-12-14 ENCOUNTER — OFFICE VISIT (OUTPATIENT)
Dept: FAMILY MEDICINE CLINIC | Facility: HOSPITAL | Age: 61
End: 2021-12-14
Payer: MEDICARE

## 2021-12-14 VITALS
OXYGEN SATURATION: 97 % | HEART RATE: 95 BPM | WEIGHT: 220 LBS | SYSTOLIC BLOOD PRESSURE: 180 MMHG | HEIGHT: 67 IN | DIASTOLIC BLOOD PRESSURE: 98 MMHG | BODY MASS INDEX: 34.53 KG/M2 | RESPIRATION RATE: 16 BRPM

## 2021-12-14 DIAGNOSIS — R11.0 NAUSEA: ICD-10-CM

## 2021-12-14 DIAGNOSIS — I10 ACCELERATED HYPERTENSION: ICD-10-CM

## 2021-12-14 DIAGNOSIS — F50.89 OTHER DISORDER OF EATING: ICD-10-CM

## 2021-12-14 DIAGNOSIS — M25.512 ACUTE PAIN OF LEFT SHOULDER: Primary | ICD-10-CM

## 2021-12-14 PROBLEM — M25.519 ACUTE SHOULDER PAIN: Status: ACTIVE | Noted: 2021-12-14

## 2021-12-14 PROCEDURE — 99213 OFFICE O/P EST LOW 20 MIN: CPT | Performed by: INTERNAL MEDICINE

## 2021-12-14 RX ORDER — QUETIAPINE FUMARATE 300 MG/1
300 TABLET, FILM COATED ORAL DAILY
COMMUNITY

## 2021-12-14 RX ORDER — ONDANSETRON 4 MG/1
TABLET, ORALLY DISINTEGRATING ORAL
Qty: 60 TABLET | Refills: 0 | Status: SHIPPED | OUTPATIENT
Start: 2021-12-14 | End: 2022-01-03 | Stop reason: ALTCHOICE

## 2021-12-15 ENCOUNTER — OFFICE VISIT (OUTPATIENT)
Dept: PHYSICAL THERAPY | Facility: CLINIC | Age: 61
End: 2021-12-15
Payer: MEDICARE

## 2021-12-15 ENCOUNTER — HOSPITAL ENCOUNTER (OUTPATIENT)
Dept: RADIOLOGY | Facility: HOSPITAL | Age: 61
Discharge: HOME | End: 2021-12-15
Attending: INTERNAL MEDICINE
Payer: MEDICARE

## 2021-12-15 ENCOUNTER — OFFICE VISIT (OUTPATIENT)
Dept: OCCUPATIONAL THERAPY | Facility: CLINIC | Age: 61
End: 2021-12-15
Payer: MEDICARE

## 2021-12-15 DIAGNOSIS — M25.512 ACUTE PAIN OF LEFT SHOULDER: ICD-10-CM

## 2021-12-15 DIAGNOSIS — R26.89 BALANCE DISORDER: Primary | ICD-10-CM

## 2021-12-15 DIAGNOSIS — S52.531A CLOSED COLLES' FRACTURE OF RIGHT RADIUS, INITIAL ENCOUNTER: ICD-10-CM

## 2021-12-15 DIAGNOSIS — Z47.89 AFTERCARE FOLLOWING SURGERY OF THE MUSCULOSKELETAL SYSTEM: Primary | ICD-10-CM

## 2021-12-15 PROCEDURE — 97140 MANUAL THERAPY 1/> REGIONS: CPT | Performed by: OCCUPATIONAL THERAPIST

## 2021-12-15 PROCEDURE — 97110 THERAPEUTIC EXERCISES: CPT | Performed by: OCCUPATIONAL THERAPIST

## 2021-12-15 PROCEDURE — 97112 NEUROMUSCULAR REEDUCATION: CPT

## 2021-12-15 PROCEDURE — 73030 X-RAY EXAM OF SHOULDER: CPT

## 2021-12-16 ENCOUNTER — LAB (OUTPATIENT)
Dept: LAB | Facility: HOSPITAL | Age: 61
End: 2021-12-16
Attending: INTERNAL MEDICINE
Payer: MEDICARE

## 2021-12-16 DIAGNOSIS — E23.2 DIABETES INSIPIDUS (HCC): ICD-10-CM

## 2021-12-16 DIAGNOSIS — E78.00 HYPERCHOLESTEREMIA: Chronic | ICD-10-CM

## 2021-12-16 DIAGNOSIS — I10 ESSENTIAL HYPERTENSION: ICD-10-CM

## 2021-12-16 DIAGNOSIS — N28.1 RENAL CYST: ICD-10-CM

## 2021-12-16 DIAGNOSIS — N25.81 SECONDARY RENAL HYPERPARATHYROIDISM (HCC): ICD-10-CM

## 2021-12-16 DIAGNOSIS — N18.30 CHRONIC KIDNEY DISEASE, STAGE 3 (HCC): ICD-10-CM

## 2021-12-16 DIAGNOSIS — N18.4 CKD (CHRONIC KIDNEY DISEASE) STAGE 4, GFR 15-29 ML/MIN (HCC): ICD-10-CM

## 2021-12-16 LAB
ALBUMIN SERPL BCP-MCNC: 3.8 G/DL (ref 3.5–5)
ALP SERPL-CCNC: 185 U/L (ref 46–116)
ALT SERPL W P-5'-P-CCNC: 24 U/L (ref 12–78)
ANION GAP SERPL CALCULATED.3IONS-SCNC: 7 MMOL/L (ref 4–13)
AST SERPL W P-5'-P-CCNC: 17 U/L (ref 5–45)
BASOPHILS # BLD AUTO: 0.06 THOUSANDS/ΜL (ref 0–0.1)
BASOPHILS NFR BLD AUTO: 1 % (ref 0–1)
BILIRUB SERPL-MCNC: 0.28 MG/DL (ref 0.2–1)
BUN SERPL-MCNC: 36 MG/DL (ref 5–25)
CALCIUM SERPL-MCNC: 9.8 MG/DL (ref 8.3–10.1)
CHLORIDE SERPL-SCNC: 109 MMOL/L (ref 100–108)
CHOLEST SERPL-MCNC: 227 MG/DL
CO2 SERPL-SCNC: 25 MMOL/L (ref 21–32)
CREAT SERPL-MCNC: 2.36 MG/DL (ref 0.6–1.3)
EOSINOPHIL # BLD AUTO: 0.31 THOUSAND/ΜL (ref 0–0.61)
EOSINOPHIL NFR BLD AUTO: 5 % (ref 0–6)
ERYTHROCYTE [DISTWIDTH] IN BLOOD BY AUTOMATED COUNT: 13.1 % (ref 11.6–15.1)
GFR SERPL CREATININE-BSD FRML MDRD: 21 ML/MIN/1.73SQ M
GLUCOSE P FAST SERPL-MCNC: 126 MG/DL (ref 65–99)
HCT VFR BLD AUTO: 39.3 % (ref 34.8–46.1)
HDLC SERPL-MCNC: 66 MG/DL
HGB BLD-MCNC: 12.3 G/DL (ref 11.5–15.4)
IMM GRANULOCYTES # BLD AUTO: 0.03 THOUSAND/UL (ref 0–0.2)
IMM GRANULOCYTES NFR BLD AUTO: 1 % (ref 0–2)
LDLC SERPL CALC-MCNC: 132 MG/DL (ref 0–100)
LYMPHOCYTES # BLD AUTO: 0.94 THOUSANDS/ΜL (ref 0.6–4.47)
LYMPHOCYTES NFR BLD AUTO: 15 % (ref 14–44)
MAGNESIUM SERPL-MCNC: 2.5 MG/DL (ref 1.6–2.6)
MCH RBC QN AUTO: 32.5 PG (ref 26.8–34.3)
MCHC RBC AUTO-ENTMCNC: 31.3 G/DL (ref 31.4–37.4)
MCV RBC AUTO: 104 FL (ref 82–98)
MONOCYTES # BLD AUTO: 0.69 THOUSAND/ΜL (ref 0.17–1.22)
MONOCYTES NFR BLD AUTO: 11 % (ref 4–12)
NEUTROPHILS # BLD AUTO: 4.45 THOUSANDS/ΜL (ref 1.85–7.62)
NEUTS SEG NFR BLD AUTO: 67 % (ref 43–75)
NRBC BLD AUTO-RTO: 0 /100 WBCS
PHOSPHATE SERPL-MCNC: 3.4 MG/DL (ref 2.7–4.5)
PLATELET # BLD AUTO: 266 THOUSANDS/UL (ref 149–390)
PMV BLD AUTO: 10.5 FL (ref 8.9–12.7)
POTASSIUM SERPL-SCNC: 4.9 MMOL/L (ref 3.5–5.3)
PROT SERPL-MCNC: 7.2 G/DL (ref 6.4–8.2)
PTH-INTACT SERPL-MCNC: 142.1 PG/ML (ref 18.4–80.1)
RBC # BLD AUTO: 3.79 MILLION/UL (ref 3.81–5.12)
SODIUM SERPL-SCNC: 141 MMOL/L (ref 136–145)
TRIGL SERPL-MCNC: 143 MG/DL
URATE SERPL-MCNC: 5.8 MG/DL (ref 2–6.8)
WBC # BLD AUTO: 6.48 THOUSAND/UL (ref 4.31–10.16)

## 2021-12-16 PROCEDURE — 83970 ASSAY OF PARATHORMONE: CPT

## 2021-12-16 PROCEDURE — 84550 ASSAY OF BLOOD/URIC ACID: CPT

## 2021-12-16 PROCEDURE — 83735 ASSAY OF MAGNESIUM: CPT

## 2021-12-16 PROCEDURE — 80053 COMPREHEN METABOLIC PANEL: CPT

## 2021-12-16 PROCEDURE — 80061 LIPID PANEL: CPT

## 2021-12-16 PROCEDURE — 84100 ASSAY OF PHOSPHORUS: CPT

## 2021-12-16 PROCEDURE — 36415 COLL VENOUS BLD VENIPUNCTURE: CPT

## 2021-12-16 PROCEDURE — 85025 COMPLETE CBC W/AUTO DIFF WBC: CPT

## 2021-12-17 ENCOUNTER — OFFICE VISIT (OUTPATIENT)
Dept: OBGYN CLINIC | Facility: CLINIC | Age: 61
End: 2021-12-17

## 2021-12-17 ENCOUNTER — APPOINTMENT (OUTPATIENT)
Dept: RADIOLOGY | Facility: CLINIC | Age: 61
End: 2021-12-17
Payer: MEDICARE

## 2021-12-17 VITALS
SYSTOLIC BLOOD PRESSURE: 144 MMHG | WEIGHT: 220 LBS | DIASTOLIC BLOOD PRESSURE: 90 MMHG | HEIGHT: 67 IN | BODY MASS INDEX: 34.53 KG/M2

## 2021-12-17 DIAGNOSIS — M20.11 ACQUIRED HALLUX INTERPHALANGEUS OF RIGHT FOOT: Primary | ICD-10-CM

## 2021-12-17 DIAGNOSIS — M20.11 ACQUIRED HALLUX INTERPHALANGEUS OF RIGHT FOOT: ICD-10-CM

## 2021-12-17 PROCEDURE — 73630 X-RAY EXAM OF FOOT: CPT

## 2021-12-17 PROCEDURE — 99024 POSTOP FOLLOW-UP VISIT: CPT | Performed by: ORTHOPAEDIC SURGERY

## 2021-12-20 ENCOUNTER — OFFICE VISIT (OUTPATIENT)
Dept: OCCUPATIONAL THERAPY | Facility: CLINIC | Age: 61
End: 2021-12-20
Payer: MEDICARE

## 2021-12-20 ENCOUNTER — OFFICE VISIT (OUTPATIENT)
Dept: PHYSICAL THERAPY | Facility: CLINIC | Age: 61
End: 2021-12-20
Payer: MEDICARE

## 2021-12-20 DIAGNOSIS — M25.531 RIGHT WRIST PAIN: ICD-10-CM

## 2021-12-20 DIAGNOSIS — R26.89 BALANCE DISORDER: Primary | ICD-10-CM

## 2021-12-20 DIAGNOSIS — Z47.89 AFTERCARE FOLLOWING SURGERY OF THE MUSCULOSKELETAL SYSTEM: Primary | ICD-10-CM

## 2021-12-20 DIAGNOSIS — S52.531A CLOSED COLLES' FRACTURE OF RIGHT RADIUS, INITIAL ENCOUNTER: ICD-10-CM

## 2021-12-20 PROCEDURE — 97112 NEUROMUSCULAR REEDUCATION: CPT

## 2021-12-20 PROCEDURE — 97110 THERAPEUTIC EXERCISES: CPT

## 2021-12-20 PROCEDURE — 97110 THERAPEUTIC EXERCISES: CPT | Performed by: OCCUPATIONAL THERAPIST

## 2021-12-20 PROCEDURE — 97140 MANUAL THERAPY 1/> REGIONS: CPT | Performed by: OCCUPATIONAL THERAPIST

## 2021-12-21 DIAGNOSIS — N25.81 SECONDARY RENAL HYPERPARATHYROIDISM (HCC): ICD-10-CM

## 2021-12-21 DIAGNOSIS — N28.1 RENAL CYST: ICD-10-CM

## 2021-12-21 DIAGNOSIS — I10 ESSENTIAL HYPERTENSION: ICD-10-CM

## 2021-12-21 DIAGNOSIS — E23.2 DIABETES INSIPIDUS (HCC): ICD-10-CM

## 2021-12-21 DIAGNOSIS — N18.30 CHRONIC KIDNEY DISEASE, STAGE 3 (HCC): ICD-10-CM

## 2021-12-21 RX ORDER — CARVEDILOL 6.25 MG/1
6.25 TABLET ORAL 2 TIMES DAILY WITH MEALS
Qty: 180 TABLET | Refills: 3 | Status: SHIPPED | OUTPATIENT
Start: 2021-12-21 | End: 2022-01-03

## 2021-12-22 ENCOUNTER — OFFICE VISIT (OUTPATIENT)
Dept: OBGYN CLINIC | Facility: CLINIC | Age: 61
End: 2021-12-22

## 2021-12-22 ENCOUNTER — OFFICE VISIT (OUTPATIENT)
Dept: PHYSICAL THERAPY | Facility: CLINIC | Age: 61
End: 2021-12-22
Payer: MEDICARE

## 2021-12-22 ENCOUNTER — OFFICE VISIT (OUTPATIENT)
Dept: OCCUPATIONAL THERAPY | Facility: CLINIC | Age: 61
End: 2021-12-22
Payer: MEDICARE

## 2021-12-22 ENCOUNTER — APPOINTMENT (OUTPATIENT)
Dept: RADIOLOGY | Facility: CLINIC | Age: 61
End: 2021-12-22
Payer: MEDICARE

## 2021-12-22 VITALS
BODY MASS INDEX: 34.21 KG/M2 | HEIGHT: 67 IN | WEIGHT: 218 LBS | DIASTOLIC BLOOD PRESSURE: 76 MMHG | SYSTOLIC BLOOD PRESSURE: 118 MMHG

## 2021-12-22 DIAGNOSIS — Z47.89 AFTERCARE FOLLOWING SURGERY OF THE MUSCULOSKELETAL SYSTEM: ICD-10-CM

## 2021-12-22 DIAGNOSIS — R26.89 BALANCE DISORDER: Primary | ICD-10-CM

## 2021-12-22 DIAGNOSIS — Z47.89 AFTERCARE FOLLOWING SURGERY OF THE MUSCULOSKELETAL SYSTEM: Primary | ICD-10-CM

## 2021-12-22 DIAGNOSIS — S52.531A CLOSED COLLES' FRACTURE OF RIGHT RADIUS, INITIAL ENCOUNTER: ICD-10-CM

## 2021-12-22 PROCEDURE — 99024 POSTOP FOLLOW-UP VISIT: CPT | Performed by: ORTHOPAEDIC SURGERY

## 2021-12-22 PROCEDURE — 97110 THERAPEUTIC EXERCISES: CPT | Performed by: OCCUPATIONAL THERAPIST

## 2021-12-22 PROCEDURE — 73110 X-RAY EXAM OF WRIST: CPT

## 2021-12-22 PROCEDURE — 97140 MANUAL THERAPY 1/> REGIONS: CPT | Performed by: OCCUPATIONAL THERAPIST

## 2021-12-22 PROCEDURE — 97112 NEUROMUSCULAR REEDUCATION: CPT

## 2021-12-27 ENCOUNTER — APPOINTMENT (OUTPATIENT)
Dept: OCCUPATIONAL THERAPY | Facility: CLINIC | Age: 61
End: 2021-12-27
Payer: MEDICARE

## 2021-12-27 ENCOUNTER — APPOINTMENT (OUTPATIENT)
Dept: PHYSICAL THERAPY | Facility: CLINIC | Age: 61
End: 2021-12-27
Payer: MEDICARE

## 2021-12-27 ENCOUNTER — TELEPHONE (OUTPATIENT)
Dept: FAMILY MEDICINE CLINIC | Facility: HOSPITAL | Age: 61
End: 2021-12-27

## 2021-12-27 DIAGNOSIS — E78.00 HYPERCHOLESTEREMIA: Primary | ICD-10-CM

## 2021-12-27 RX ORDER — ROSUVASTATIN CALCIUM 20 MG/1
20 TABLET, COATED ORAL DAILY
Qty: 90 TABLET | Refills: 1 | Status: SHIPPED | OUTPATIENT
Start: 2021-12-27 | End: 2022-04-05 | Stop reason: SDUPTHER

## 2021-12-28 ENCOUNTER — OFFICE VISIT (OUTPATIENT)
Dept: NEPHROLOGY | Facility: HOSPITAL | Age: 61
End: 2021-12-28
Payer: MEDICARE

## 2021-12-28 VITALS — SYSTOLIC BLOOD PRESSURE: 170 MMHG | DIASTOLIC BLOOD PRESSURE: 80 MMHG | HEART RATE: 72 BPM

## 2021-12-28 DIAGNOSIS — N18.4 STAGE 4 CHRONIC KIDNEY DISEASE (HCC): Primary | ICD-10-CM

## 2021-12-28 DIAGNOSIS — M20.5X1 CLAW TOE, ACQUIRED, RIGHT: ICD-10-CM

## 2021-12-28 DIAGNOSIS — S99.921A INJURY OF PLANTAR PLATE, RIGHT, INITIAL ENCOUNTER: ICD-10-CM

## 2021-12-28 DIAGNOSIS — M20.11 ACQUIRED HALLUX INTERPHALANGEUS, RIGHT: ICD-10-CM

## 2021-12-28 PROCEDURE — 99214 OFFICE O/P EST MOD 30 MIN: CPT | Performed by: INTERNAL MEDICINE

## 2021-12-28 RX ORDER — ASPIRIN 81 MG/1
81 TABLET, CHEWABLE ORAL DAILY
Start: 2021-12-28

## 2021-12-29 ENCOUNTER — OFFICE VISIT (OUTPATIENT)
Dept: OCCUPATIONAL THERAPY | Facility: CLINIC | Age: 61
End: 2021-12-29
Payer: MEDICARE

## 2021-12-29 ENCOUNTER — OFFICE VISIT (OUTPATIENT)
Dept: PHYSICAL THERAPY | Facility: CLINIC | Age: 61
End: 2021-12-29
Payer: MEDICARE

## 2021-12-29 DIAGNOSIS — R26.89 BALANCE DISORDER: Primary | ICD-10-CM

## 2021-12-29 DIAGNOSIS — Z47.89 AFTERCARE FOLLOWING SURGERY OF THE MUSCULOSKELETAL SYSTEM: Primary | ICD-10-CM

## 2021-12-29 DIAGNOSIS — S52.531A CLOSED COLLES' FRACTURE OF RIGHT RADIUS, INITIAL ENCOUNTER: ICD-10-CM

## 2021-12-29 PROCEDURE — 97110 THERAPEUTIC EXERCISES: CPT

## 2021-12-29 PROCEDURE — 97110 THERAPEUTIC EXERCISES: CPT | Performed by: OCCUPATIONAL THERAPIST

## 2021-12-29 PROCEDURE — 97140 MANUAL THERAPY 1/> REGIONS: CPT | Performed by: OCCUPATIONAL THERAPIST

## 2021-12-29 PROCEDURE — 97112 NEUROMUSCULAR REEDUCATION: CPT

## 2021-12-30 ENCOUNTER — APPOINTMENT (EMERGENCY)
Dept: RADIOLOGY | Facility: HOSPITAL | Age: 61
End: 2021-12-30
Payer: MEDICARE

## 2021-12-30 ENCOUNTER — APPOINTMENT (EMERGENCY)
Dept: CT IMAGING | Facility: HOSPITAL | Age: 61
End: 2021-12-30
Payer: MEDICARE

## 2021-12-30 ENCOUNTER — TELEPHONE (OUTPATIENT)
Dept: FAMILY MEDICINE CLINIC | Facility: HOSPITAL | Age: 61
End: 2021-12-30

## 2021-12-30 ENCOUNTER — HOSPITAL ENCOUNTER (EMERGENCY)
Facility: HOSPITAL | Age: 61
Discharge: HOME | End: 2021-12-30
Attending: EMERGENCY MEDICINE
Payer: MEDICARE

## 2021-12-30 VITALS
DIASTOLIC BLOOD PRESSURE: 80 MMHG | OXYGEN SATURATION: 93 % | HEIGHT: 67 IN | BODY MASS INDEX: 34.53 KG/M2 | HEART RATE: 97 BPM | TEMPERATURE: 98 F | RESPIRATION RATE: 21 BRPM | WEIGHT: 220 LBS | SYSTOLIC BLOOD PRESSURE: 171 MMHG

## 2021-12-30 DIAGNOSIS — R51.9 HEADACHE: ICD-10-CM

## 2021-12-30 DIAGNOSIS — R03.0 ELEVATED BLOOD PRESSURE READING: Primary | ICD-10-CM

## 2021-12-30 LAB
2HR DELTA HS TROPONIN: 0 NG/L
4HR DELTA HS TROPONIN: 0 NG/L
ALBUMIN SERPL BCP-MCNC: 4.1 G/DL (ref 3.5–5)
ALP SERPL-CCNC: 184 U/L (ref 46–116)
ALT SERPL W P-5'-P-CCNC: 30 U/L (ref 12–78)
ANION GAP SERPL CALCULATED.3IONS-SCNC: 7 MMOL/L (ref 4–13)
AST SERPL W P-5'-P-CCNC: 22 U/L (ref 5–45)
BASOPHILS # BLD AUTO: 0.08 THOUSANDS/ΜL (ref 0–0.1)
BASOPHILS NFR BLD AUTO: 1 % (ref 0–1)
BILIRUB SERPL-MCNC: 0.3 MG/DL (ref 0.2–1)
BUN SERPL-MCNC: 23 MG/DL (ref 5–25)
CALCIUM SERPL-MCNC: 9.7 MG/DL (ref 8.3–10.1)
CARDIAC TROPONIN I PNL SERPL HS: 8 NG/L
CHLORIDE SERPL-SCNC: 103 MMOL/L (ref 100–108)
CO2 SERPL-SCNC: 27 MMOL/L (ref 21–32)
CREAT SERPL-MCNC: 2.29 MG/DL (ref 0.6–1.3)
EOSINOPHIL # BLD AUTO: 0.29 THOUSAND/ΜL (ref 0–0.61)
EOSINOPHIL NFR BLD AUTO: 4 % (ref 0–6)
ERYTHROCYTE [DISTWIDTH] IN BLOOD BY AUTOMATED COUNT: 13 % (ref 11.6–15.1)
GFR SERPL CREATININE-BSD FRML MDRD: 22 ML/MIN/1.73SQ M
GLUCOSE SERPL-MCNC: 102 MG/DL (ref 65–140)
HCT VFR BLD AUTO: 38.7 % (ref 34.8–46.1)
HGB BLD-MCNC: 12.3 G/DL (ref 11.5–15.4)
IMM GRANULOCYTES # BLD AUTO: 0.02 THOUSAND/UL (ref 0–0.2)
IMM GRANULOCYTES NFR BLD AUTO: 0 % (ref 0–2)
LYMPHOCYTES # BLD AUTO: 1.93 THOUSANDS/ΜL (ref 0.6–4.47)
LYMPHOCYTES NFR BLD AUTO: 24 % (ref 14–44)
MCH RBC QN AUTO: 31.8 PG (ref 26.8–34.3)
MCHC RBC AUTO-ENTMCNC: 31.8 G/DL (ref 31.4–37.4)
MCV RBC AUTO: 100 FL (ref 82–98)
MONOCYTES # BLD AUTO: 0.71 THOUSAND/ΜL (ref 0.17–1.22)
MONOCYTES NFR BLD AUTO: 9 % (ref 4–12)
NEUTROPHILS # BLD AUTO: 4.98 THOUSANDS/ΜL (ref 1.85–7.62)
NEUTS SEG NFR BLD AUTO: 62 % (ref 43–75)
NRBC BLD AUTO-RTO: 0 /100 WBCS
PLATELET # BLD AUTO: 290 THOUSANDS/UL (ref 149–390)
PMV BLD AUTO: 9.9 FL (ref 8.9–12.7)
POTASSIUM SERPL-SCNC: 4.6 MMOL/L (ref 3.5–5.3)
PROT SERPL-MCNC: 7.8 G/DL (ref 6.4–8.2)
RBC # BLD AUTO: 3.87 MILLION/UL (ref 3.81–5.12)
SODIUM SERPL-SCNC: 137 MMOL/L (ref 136–145)
WBC # BLD AUTO: 8.01 THOUSAND/UL (ref 4.31–10.16)

## 2021-12-30 PROCEDURE — 85025 COMPLETE CBC W/AUTO DIFF WBC: CPT | Performed by: EMERGENCY MEDICINE

## 2021-12-30 PROCEDURE — 36415 COLL VENOUS BLD VENIPUNCTURE: CPT | Performed by: EMERGENCY MEDICINE

## 2021-12-30 PROCEDURE — G1004 CDSM NDSC: HCPCS

## 2021-12-30 PROCEDURE — 71045 X-RAY EXAM CHEST 1 VIEW: CPT

## 2021-12-30 PROCEDURE — 80053 COMPREHEN METABOLIC PANEL: CPT | Performed by: EMERGENCY MEDICINE

## 2021-12-30 PROCEDURE — 99285 EMERGENCY DEPT VISIT HI MDM: CPT | Performed by: EMERGENCY MEDICINE

## 2021-12-30 PROCEDURE — 99284 EMERGENCY DEPT VISIT MOD MDM: CPT

## 2021-12-30 PROCEDURE — 70450 CT HEAD/BRAIN W/O DYE: CPT

## 2021-12-30 PROCEDURE — 84484 ASSAY OF TROPONIN QUANT: CPT | Performed by: EMERGENCY MEDICINE

## 2021-12-30 RX ORDER — ACETAMINOPHEN 325 MG/1
650 TABLET ORAL ONCE
Status: COMPLETED | OUTPATIENT
Start: 2021-12-30 | End: 2021-12-30

## 2021-12-30 RX ORDER — HYDROCODONE BITARTRATE AND ACETAMINOPHEN 5; 325 MG/1; MG/1
1 TABLET ORAL ONCE
Status: COMPLETED | OUTPATIENT
Start: 2021-12-30 | End: 2021-12-30

## 2021-12-30 RX ORDER — HYDROCODONE BITARTRATE AND ACETAMINOPHEN 5; 325 MG/1; MG/1
1 TABLET ORAL EVERY 8 HOURS PRN
Qty: 10 TABLET | Refills: 0 | Status: SHIPPED | OUTPATIENT
Start: 2021-12-30 | End: 2022-02-14 | Stop reason: SDUPTHER

## 2021-12-30 RX ADMIN — HYDROCODONE BITARTRATE AND ACETAMINOPHEN 1 TABLET: 5; 325 TABLET ORAL at 18:42

## 2021-12-30 RX ADMIN — ACETAMINOPHEN 650 MG: 325 TABLET, FILM COATED ORAL at 15:28

## 2021-12-30 NOTE — ED PROVIDER NOTES
History  Chief Complaint   Patient presents with    Hypertension     Pt reports headache x 1 week, states her BP has been his since she was seen at PCP yesterday  Pt took her bp meds @ 0700 today  This 65-year-old female with history of hypertension, bipolar 1 disorder, panic disorder, asthma, hypercholesterolemia, mild asthma and end-stage renal disease on hemodialysis complains of global headache for the past 5 days  She has concomitant mild bilateral neck pain and had some right ear pain that is improved after over-the-counter ear drops  Patient feel there is slight dimming of her vision in both eyes  His blood pressures been elevated to approximately 200/100 several times over last 3 days  She saw her nephrologist the other day with blood pressure about 712 systolic  She had not taken anything for this pain but she got a Tylenol in the waiting room and feels better  Her current blood pressure is 164/79  She denies fever, sore throat, sinus congestion, diplopia, vertigo, change in hearing, chest pain, palpitations, incontinence and focal neurologic changes  Prior to Admission Medications   Prescriptions Last Dose Informant Patient Reported? Taking?    AMILoride 5 mg tablet  Self No No   Sig: Take 1 tablet by mouth twice daily   Polyethylene Glycol 3350 (MIRALAX PO)  Self Yes No   Sig: Take by mouth as needed    QUEtiapine (SEROquel) 100 mg tablet  Self Yes No   Sig: Take 1 tablet by mouth daily after breakfast    QUEtiapine (SEROquel) 300 mg tablet  Self Yes No   Si at bedtime along with a 400 mg tab   QUEtiapine (SEROquel) 400 MG tablet  Self Yes No   Si at bedtime along with a 300 mg tab    acetaminophen (TYLENOL) 325 mg tablet  Self Yes No   Sig: Take 650 mg by mouth every 6 (six) hours as needed for mild pain   albuterol (Ventolin HFA) 90 mcg/act inhaler  Self No No   Sig: Inhale 2 puffs every 6 (six) hours as needed for wheezing or shortness of breath   aspirin 81 mg chewable tablet   No No   Sig: Chew 1 tablet (81 mg total) daily   budesonide-formoterol (SYMBICORT) 160-4 5 mcg/act inhaler  Self No No   Sig: Inhale 2 puffs 2 (two) times a day Rinse mouth after use  Patient taking differently: Inhale 2 puffs 2 (two) times a day as needed Rinse mouth after use  carvedilol (COREG) 6 25 mg tablet   No No   Sig: Take 1 tablet (6 25 mg total) by mouth 2 (two) times a day with meals   clonazePAM (KlonoPIN) 0 5 mg tablet  Self No No   Sig: Take 1 tablet (0 5 mg total) by mouth 3 (three) times a day   fluocinonide (LIDEX) 0 05 % ointment  Self No No   Sig: Apply topically 2 (two) times a day   fluvoxaMINE (LUVOX) 100 mg tablet  Self Yes No   Si (two) times a day 1 tab am , 2 tabs HS   lamoTRIgine (LaMICtal) 200 MG tablet  Self Yes No   Sig: Take 200 mg by mouth 2 (two) times a day    linaCLOtide (Linzess) 290 MCG CAPS  Self No No   Sig: Take 1 capsule by mouth daily   omeprazole (PriLOSEC) 40 MG capsule  Self No No   Sig: Take 1 capsule (40 mg total) by mouth daily before breakfast   ondansetron (ZOFRAN) 4 mg tablet  Self No No   Sig: Take 1 tablet (4 mg total) by mouth every 8 (eight) hours as needed for nausea or vomiting   ondansetron (ZOFRAN-ODT) 4 mg disintegrating tablet  Self No No   Sig: DISSOLVE 1 TABLET IN MOUTH EVERY 8 HOURS AS NEEDED FOR NAUSEA AND VOMITING  Not for daily use  Use sparingly     rosuvastatin (CRESTOR) 20 MG tablet   No No   Sig: Take 1 tablet (20 mg total) by mouth daily      Facility-Administered Medications: None       Past Medical History:   Diagnosis Date    Anxiety     Anxiety disorder     Bipolar 2 disorder (HCC)     Chronic back pain     Closed fracture of distal end of right fibula with routine healing 2020    COVID-19     in 2021    CVA (cerebral vascular accident) [facility])     noted on MRI in the past    Depression     GERD (gastroesophageal reflux disease)     Hypercholesteremia     Hypernatremia     Hypertension     Hypokalemia  Idiopathic chronic pancreatitis ([facility] [address] ) 3/20/2018    Intervertebral disc disorder with radiculopathy of lumbosacral region     resolved: 2015    Kidney disease     Panic attacks     Pericardial effusion     PONV (postoperative nausea and vomiting)     Radiculitis     resolved: 2015    Secondary renal hyperparathyroidism ([facility] [address] )     Vitamin D deficiency        Past Surgical History:   Procedure Laterality Date    BUNIONECTOMY      Left foot     COLON SURGERY      COLONOSCOPY  2021    DILATION AND CURETTAGE OF UTERUS      INDUCED       surgically induced    SC ANASTOMOSIS,AV,ANY SITE Left 2019    Procedure: CREATION FISTULA ARTERIOVENOUS (AV) left wrists possible left upper;  Surgeon: Shawn Humphrey MD;  [facility];  Service: Vascular    [facility] Rafael Bodeyvi Left 2019    Procedure: Franklin De Anda;  Surgeon: Agustin Martínez DPM;  Location[facility];  Service: [address] [place] [address] [facility] Rafael Bodeyvi Right 8/3/2020    Procedure: Alex Bates;  Surgeon: Agustin Martínez DPM;  [facility];  Service: Podiatry    [facility] [address] PHLNX OSTEOT Right 2021    Procedure: Luisa Whitney, right tameka osteotomy and 2nd claw toe correction;  Surgeon: Arlys Cogan, MD;  [facility];  Service: Orthopedics    SC ERCP DX COLLECTION SPECIMEN BRUSHING/WASHING N/A 2018    Procedure: ENDOSCOPIC RETROGRADE CHOLANGIOPANCREATOGRAPHY (ERCP);   Surgeon: Esther Recio MD;  [facility];  Service: Gastroenterology    SC LAP, SURG PROCTOPEXY N/A 2018    Procedure: ROBOTIC SIGMOID RESECTION / RECTOPEXY;  Surgeon: Shruthi Gupta MD;  Location: [facility] MAIN [facility];  Service: Colorectal    SC OPEN TREATMENT RADIAL SHAFT FRACTURE Right 10/11/2021    Procedure: OPEN REDUCTION W/ INTERNAL FIXATION (ORIF) RADIUS (WRIST), RIGHT DISTAL;  Surgeon: Ella Gramajo MD; Location:  MAIN OR;  Service: Orthopedics    WI SIGMOIDOSCOPY FLX DX W/COLLJ SPEC BR/WA IF PFRMD N/A 7/13/2018    Procedure: Calvin Winston;  Surgeon: Nate Galarza MD;  Location: [facility] MAIN OR;  Service: Colorectal    TUBAL LIGATION Bilateral [facility] THYROID BIOPSY  7/30/2019       Family History   Problem Relation Age of Onset    Bipolar disorder Mother     Mental illness Mother         depression    Stroke Mother     Dementia Mother     Colon polyps Mother     Heart disease Father     Hypertension Father     Diabetes Father     Other Family         Back disorder    Diabetes Family     Heart disease Family     Hypertension Family     Stroke Family     Thyroid disease Family     Breast cancer Paternal [de-identified]         age unknown   Juan Diego Pepito Breast cancer Paternal [de-identified]         age unknown   Juan Diego Pepito Breast cancer Maternal Aunt         age unknown    Mental illness Sister     Colon polyps Sister     Mental illness Sister     Heart disease Sister     No Known Problems Sister     Breast cancer Sister 76    Other Son         pituitary tumor    Hypertension Son     Obesity Son     No Known Problems Son     No Known Problems Maternal Grandmother     No Known Problems Maternal Grandfather     No Known Problems Paternal Grandfather     Breast cancer Paternal Aunt         age unknown    Substance Abuse Neg Hx         neg fam hx    Colon cancer Neg Hx      I have reviewed and agree with the history as documented      E-Cigarette/Vaping    E-Cigarette Use Never User      E-Cigarette/Vaping Substances    Nicotine No     THC No     CBD No     Flavoring No     Other No     Unknown No      Social History     Tobacco Use    Smoking status: Never Smoker    Smokeless tobacco: Never Used   Vaping Use    Vaping Use: Never used   Substance Use Topics    Alcohol use: Never    Drug use: Not Currently     Types: Marijuana       Review of Systems   Constitutional: Negative for activity change, chills, diaphoresis and fever  HENT: Positive for ear pain  Negative for congestion and sore throat  Respiratory: Positive for cough  Negative for shortness of breath  Cardiovascular: Negative for chest pain, palpitations and leg swelling  Gastrointestinal: Positive for diarrhea (A few times yesterday)  Negative for constipation and nausea  Musculoskeletal: Negative for myalgias and neck stiffness  Skin: Negative for rash and wound  Neurological: Negative for dizziness, tremors, seizures, syncope, facial asymmetry, speech difficulty, weakness, light-headedness and numbness  Physical Exam  Physical Exam  Vitals and nursing note reviewed  Constitutional:       General: She is not in acute distress  Appearance: She is well-developed  She is obese  She is not ill-appearing or diaphoretic  HENT:      Head: Normocephalic and atraumatic  Right Ear: Tympanic membrane, ear canal and external ear normal       Left Ear: Tympanic membrane, ear canal and external ear normal       Nose: Nose normal       Mouth/Throat:      Mouth: Mucous membranes are moist       Pharynx: Oropharynx is clear  Eyes:      General: No scleral icterus  Conjunctiva/sclera: Conjunctivae normal       Pupils: Pupils are equal, round, and reactive to light  Neck:      Vascular: No carotid bruit  Cardiovascular:      Rate and Rhythm: Normal rate and regular rhythm  Pulses: Normal pulses  Heart sounds: No murmur heard  Comments: Good thrill and bruit in left wrist AV fistula  Pulmonary:      Effort: Pulmonary effort is normal       Breath sounds: Normal breath sounds  Abdominal:      General: Bowel sounds are normal       Palpations: Abdomen is soft  Tenderness: There is no abdominal tenderness  There is no guarding or rebound  Musculoskeletal:         General: No tenderness  Normal range of motion  Cervical back: Normal range of motion and neck supple  No rigidity        Right lower leg: Edema (Trace) present  Left lower leg: Edema ( trace) present  Lymphadenopathy:      Cervical: No cervical adenopathy  Skin:     General: Skin is warm and dry  Capillary Refill: Capillary refill takes less than 2 seconds  Findings: No lesion or rash  Neurological:      General: No focal deficit present  Mental Status: She is alert and oriented to person, place, and time  Cranial Nerves: No cranial nerve deficit  Sensory: No sensory deficit  Motor: No weakness  Coordination: Coordination normal       Gait: Gait normal       Deep Tendon Reflexes: Reflexes are normal and symmetric   Reflexes normal    Psychiatric:         Mood and Affect: Mood normal          Behavior: Behavior normal          Vital Signs  ED Triage Vitals [12/30/21 1512]   Temperature Pulse Respirations Blood Pressure SpO2   98 °F (36 7 °C) 99 16 (!) 201/101 99 %      Temp Source Heart Rate Source Patient Position - Orthostatic VS BP Location FiO2 (%)   Temporal Monitor Sitting Right arm --      Pain Score       8           Vitals:    12/30/21 1830 12/30/21 1939 12/30/21 2100 12/30/21 2330   BP:  (!) 176/79 (!) 173/78 (!) 171/80   Pulse: 94 95 103 97   Patient Position - Orthostatic VS:             Visual Acuity      ED Medications  Medications   acetaminophen (TYLENOL) tablet 650 mg (650 mg Oral Given 12/30/21 1528)   HYDROcodone-acetaminophen (NORCO) 5-325 mg per tablet 1 tablet (1 tablet Oral Given 12/30/21 1842)       Diagnostic Studies  Results Reviewed     Procedure Component Value Units Date/Time    HS Troponin I 4hr [[id number]] Collected: 12/30/21 2138    Lab Status: Final result Specimen: Blood from Arm, Right Updated: 12/30/21 2211     hs TnI 4hr 8 ng/L      Delta 4hr hsTnI 0 ng/L     HS Troponin I 2hr [[id number]] Collected: 12/30/21 1938    Lab Status: Final result Specimen: Blood from Arm, Right Updated: 12/30/21 2010     hs TnI 2hr 8 ng/L      Delta 2hr hsTnI 0 ng/L     HS Troponin 0hr (reflex protocol) [[id number]]  (Normal) Collected: 12/30/21 1723    Lab Status: Final result Specimen: Blood from Arm, Right Updated: 12/30/21 1754     hs TnI 0hr 8 ng/L     Comprehensive metabolic panel [[id number]]  (Abnormal) Collected: 12/30/21 1723    Lab Status: Final result Specimen: Blood from Arm, Right Updated: 12/30/21 1750     Sodium 137 mmol/L      Potassium 4 6 mmol/L      Chloride 103 mmol/L      CO2 27 mmol/L      ANION GAP 7 mmol/L      BUN 23 mg/dL      Creatinine 2 29 mg/dL      Glucose 102 mg/dL      Calcium 9 7 mg/dL      AST 22 U/L      ALT 30 U/L      Alkaline Phosphatase 184 U/L      Total Protein 7 8 g/dL      Albumin 4 1 g/dL      Total Bilirubin 0 30 mg/dL      eGFR 22 ml/min/1 73sq m     Narrative:      National Kidney Disease Foundation guidelines for Chronic Kidney Disease (CKD):     Stage 1 with normal or high GFR (GFR > 90 mL/min/1 73 square meters)    Stage 2 Mild CKD (GFR = 60-89 mL/min/1 73 square meters)    Stage 3A Moderate CKD (GFR = 45-59 mL/min/1 73 square meters)    Stage 3B Moderate CKD (GFR = 30-44 mL/min/1 73 square meters)    Stage 4 Severe CKD (GFR = 15-29 mL/min/1 73 square meters)    Stage 5 End Stage CKD (GFR <15 mL/min/1 73 square meters)  Note: GFR calculation is accurate only with a steady state creatinine    CBC and differential [[id number]]  (Abnormal) Collected: 12/30/21 1723    Lab Status: Final result Specimen: Blood from Arm, Right Updated: 12/30/21 1731     WBC 8 01 Thousand/uL      RBC 3 87 Million/uL      Hemoglobin 12 3 g/dL      Hematocrit 38 7 %       fL      MCH 31 8 pg      MCHC 31 8 g/dL      RDW 13 0 %      MPV 9 9 fL      Platelets 133 Thousands/uL      nRBC 0 /100 WBCs      Neutrophils Relative 62 %      Immat GRANS % 0 %      Lymphocytes Relative 24 %      Monocytes Relative 9 %      Eosinophils Relative 4 %      Basophils Relative 1 %      Neutrophils Absolute 4 98 Thousands/µL      Immature Grans Absolute 0 02 Thousand/uL Lymphocytes Absolute 1 93 Thousands/µL      Monocytes Absolute 0 71 Thousand/µL      Eosinophils Absolute 0 29 Thousand/µL      Basophils Absolute 0 08 Thousands/µL                  CT head without contrast   Final Result by Pippa Ron MD (12/30 1924)      No acute intracranial abnormality  Workstation performed: [id number]         XR chest 1 view portable   ED Interpretation by Demetrice Glover DO (12/30 1955)   No acute disease      Final Result by Didi Hartman MD (12/31 0993)      No acute cardiopulmonary disease  Workstation performed: [id number]                    Procedures  ECG 12 Lead Documentation Only    Date/Time: 12/30/2021 11:31 PM  Performed by: Demetrice Glover DO  Authorized by: Demetrice Glover DO     ECG reviewed by me, the ED Provider: yes    Patient location:  ED  Previous ECG:     Previous ECG:  Unavailable    Comparison to cardiac monitor: Yes    Rate:     ECG rate assessment: normal    Rhythm:     Rhythm: sinus rhythm    Ectopy:     Ectopy: none    QRS:     QRS axis:  Right    QRS intervals:  Normal  Conduction:     Conduction: normal    ST segments:     ST segments:  Normal  T waves:     T waves: normal               ED Course  ED Course as of 12/31/21 2121   Thu Dec 30, 2021   2347 Patient feels much more comfortable  Blood pressure 176/80  Requesting analgesics for home use  I explained that she could use these sparingly but must follow-up with her PCP  SBIRT 20yo+      Most Recent Value   SBIRT (24 yo +)    In order to provide better care to our patients, we are screening all of our patients for alcohol and drug use  Would it be okay to ask you these screening questions? Yes Filed at: 12/30/2021 2026   Initial Alcohol Screen: US AUDIT-C     1  How often do you have a drink containing alcohol? 0 Filed at: 12/30/2021 2026   2  How many drinks containing alcohol do you have on a typical day you are drinking?   0 Filed at: 12/30/2021 2026   3a  Male UNDER 65: How often do you have five or more drinks on one occasion? 0 Filed at: 12/30/2021 2026   3b  FEMALE Any Age, or MALE 65+: How often do you have 4 or more drinks on one occassion? 0 Filed at: 12/30/2021 2026   Audit-C Score 0 Filed at: 12/30/2021 2026   ZAYRA: How many times in the past year have you    Used an illegal drug or used a prescription medication for non-medical reasons? Never Filed at: 12/30/2021 2026                    MDM  Number of Diagnoses or Management Options  Elevated blood pressure reading: established and worsening  Headache: established and worsening  Diagnosis management comments: Benign year old female with stage 4 kidney disease and hypertension complaining of headache for several days with elevated blood pressure readings  He tried over-the-counter medications without relief  There is no signs of CHF or acute stroke but will do workup for these as well as worsening renal function, intracranial hemorrhage and other organ dysfunction related to hypertension  Patient did feel relief and had improvement in vital signs with analgesics  She has no signs of more significant pathology currently  I feel this time that analgesics to get her to the weekend where she could continue to record her blood pressure is a safer plan then to start her on antihypertensive at this time  Patient understands she to will need to follow-up close with her PCP         Amount and/or Complexity of Data Reviewed  Clinical lab tests: ordered and reviewed  Tests in the radiology section of CPT®: ordered and reviewed  Review and summarize past medical records: yes  Independent visualization of images, tracings, or specimens: yes        Disposition  Final diagnoses:   Elevated blood pressure reading   Headache     Time reflects when diagnosis was documented in both MDM as applicable and the Disposition within this note     Time User Action Codes Description Comment 12/30/2021 11:39 PM Alvaro Reyes Add [R03 0] Elevated blood pressure reading     12/30/2021 11:39 PM Alvaro Reyes Add [R51 9] Headache       ED Disposition     ED Disposition Condition Date/Time Comment    Discharge Stable Thu Dec 30, 2021 11:39 PM Jazzy discharge to home/self care              Follow-up Information     Follow up With Specialties Details Why Colleen Mabry [address], DO Internal Medicine Call in 4 days  Emilio  SUDHAKAR Jose [address]kaushik [id number] [facility] [address]  [phone]            Discharge Medication List as of 12/30/2021 11:47 PM      START taking these medications    Details   HYDROcodone-acetaminophen (NORCO) 5-325 mg per tablet Take 1 tablet by mouth every 8 (eight) hours as needed (moderate-severe headache pain) for up to 10 doses Max Daily Amount: 3 tablets, Starting Thu 12/30/2021, Normal         CONTINUE these medications which have NOT CHANGED    Details   acetaminophen (TYLENOL) 325 mg tablet Take 650 mg by mouth every 6 (six) hours as needed for mild pain, Historical Med      albuterol (Ventolin HFA) 90 mcg/act inhaler Inhale 2 puffs every 6 (six) hours as needed for wheezing or shortness of breath, Starting Mon 5/3/2021, Normal      AMILoride 5 mg tablet Take 1 tablet by mouth twice daily, Normal      aspirin 81 mg chewable tablet Chew 1 tablet (81 mg total) daily, Starting Tue 12/28/2021, No Print      budesonide-formoterol (SYMBICORT) 160-4 5 mcg/act inhaler Inhale 2 puffs 2 (two) times a day Rinse mouth after use , Starting Wed 5/5/2021, Print      carvedilol (COREG) 6 25 mg tablet Take 1 tablet (6 25 mg total) by mouth 2 (two) times a day with meals, Starting Tue 12/21/2021, Normal      clonazePAM (KlonoPIN) 0 5 mg tablet Take 1 tablet (0 5 mg total) by mouth 3 (three) times a day, Starting Fri 10/29/2021, Normal      fluocinonide (LIDEX) 0 05 % ointment Apply topically 2 (two) times a day, Starting Sun 4/11/2021, Normal      fluvoxaMINE (LUVOX) 100 mg tablet 2 (two) times a day 1 tab am , 2 tabs HS, Starting Wed 10/10/2018, Historical Med      lamoTRIgine (LaMICtal) 200 MG tablet Take 200 mg by mouth 2 (two) times a day , Starting Mon 4/13/2020, Historical Med      linaCLOtide (Linzess) 290 MCG CAPS Take 1 capsule by mouth daily, Starting Fri 10/15/2021, Until Thu 1/13/2022, Print      omeprazole (PriLOSEC) 40 MG capsule Take 1 capsule (40 mg total) by mouth daily before breakfast, Starting Wed 11/24/2021, Normal      ondansetron (ZOFRAN) 4 mg tablet Take 1 tablet (4 mg total) by mouth every 8 (eight) hours as needed for nausea or vomiting, Starting Fri 11/19/2021, Normal      ondansetron (ZOFRAN-ODT) 4 mg disintegrating tablet DISSOLVE 1 TABLET IN MOUTH EVERY 8 HOURS AS NEEDED FOR NAUSEA AND VOMITING  Not for daily use  Use sparingly , Normal      Polyethylene Glycol 3350 (MIRALAX PO) Take by mouth as needed , Historical Med      !! QUEtiapine (SEROquel) 100 mg tablet Take 1 tablet by mouth daily after breakfast , Historical Med      !! QUEtiapine (SEROquel) 300 mg tablet 1 at bedtime along with a 400 mg tab, Historical Med      !! QUEtiapine (SEROquel) 400 MG tablet 1 at bedtime along with a 300 mg tab , Historical Med      rosuvastatin (CRESTOR) 20 MG tablet Take 1 tablet (20 mg total) by mouth daily, Starting Mon 12/27/2021, Normal       !! - Potential duplicate medications found  Please discuss with provider  No discharge procedures on file      PDMP Review       Value Time User    PDMP Reviewed  Yes 10/1/2021  3:22 PM Christopher Sidhu DO          ED Provider  Electronically Signed by           Randall Prince DO  12/31/21 2121

## 2021-12-30 NOTE — ED NOTES
[facility]  12/30/21 3670

## 2021-12-31 NOTE — DISCHARGE INSTRUCTIONS
Continue present medications  Take Tylenol for headache pain  Reserve narcotic for severe pain  Keep well hydrated  Be sure to get enough sleep  Try to do gentle stretches and walking during the day  Record your blood pressure twice a day until you speak to your doctor on Monday

## 2022-01-01 NOTE — ED PROVIDER NOTES
History  Chief Complaint   Patient presents with    Weakness - Generalized     To ED with c/o weakness after 3 days of nausea and vomiting and diarrhea  Denies any fever or chills  States that she is worried about her kidneys  51-year-old female with history of stage 4 chronic kidney disease, prior CVA, hyperlipidemia, COPD, bipolar disorder, hypertension, and anxiety presents emergency department for evaluation of persistent nonbilious, nonbloody emesis x3 days  Patient states she has been unable to tolerate any p o  Fluids over the past 48 hours, and has been feeling increasingly weak and lightheaded  She states that today she had difficulty walking up and down her steps  Denies any dizziness while at rest   Does report a moderate headache, however denies fevers, chills, sweats, vision changes, neck pain, chest pain, or shortness of breath  Denies any diarrhea or lower urinary tract symptoms  No recent suspicious food intake or recent international travel  Denies any respiratory symptoms  On exam, patient is conscious and alert, mildly tachycardic but with otherwise normal vital signs  She does not appear in any distress  Cardiac exam is benign the exception of mild tachycardia, lungs are clear bilaterally  Abdomen is generally tender with no focal signs or peritoneal findings  She has no peripheral edema, and no petechial lesions or rashes  Peripheral pulses are strong  Neurologic exam is nonfocal and nonlateralizing    A/P:  Vomiting, weakness  Will check electrolytes, CBC, magnesium, phosphorus, give 500 cc normal saline bolus  Will also check UA and noncontrast CT of the abdomen and pelvis due to patient's low GFR at baseline  Will give IV Zofran and reassess          Prior to Admission Medications   Prescriptions Last Dose Informant Patient Reported? Taking?    AMILoride 5 mg tablet 2/6/2020 at Unknown time Self Yes Yes   Sig: Take 5 mg by mouth 2 (two) times a day   Polyethylene Glycol 3350 (MIRALAX PO) Not Taking at Unknown time Self Yes No   Sig: Take by mouth as needed    QUEtiapine (SEROQUEL) 300 mg tablet 2020 at Unknown time Self No Yes   Sig: Take 1 tablet (300 mg total) by mouth every morning 1 in the AM     ( also takes 400 mg at bedtime)   QUEtiapine (SEROquel) 400 MG tablet 2020 at Unknown time Self Yes Yes   Si at bedtime   acetaminophen (TYLENOL) 325 mg tablet  Self Yes No   Sig: Take 650 mg by mouth every 6 (six) hours as needed for mild pain   albuterol (PROAIR HFA) 90 mcg/act inhaler More than a month at Unknown time Self No No   Sig: Inhale 2 puffs every 6 (six) hours as needed for wheezing   aspirin (ECOTRIN LOW STRENGTH) 81 mg EC tablet 2020 at Unknown time Self No Yes   Sig: Take 1 tablet (81 mg total) by mouth daily   atorvastatin (LIPITOR) 40 mg tablet 2020 at Unknown time Self No Yes   Sig: Take 1 tablet (40 mg total) by mouth daily   budesonide-formoterol (SYMBICORT) 160-4 5 mcg/act inhaler Not Taking at Unknown time Self No No   Sig: Inhale 2 puffs 2 (two) times a day Rinse mouth after use     Patient not taking: Reported on 2020   carvedilol (COREG) 3 125 mg tablet 2020 at Unknown time Self No Yes   Sig: TAKE 1 TABLET BY MOUTH TWICE DAILY WITH MEALS   clonazePAM (KlonoPIN) 0 5 mg tablet 2020 at Unknown time Self No Yes   Sig: Take 1 tablet (0 5 mg total) by mouth 3 (three) times a day   fluocinonide (LIDEX) 0 05 % ointment 2020 at Unknown time Self No Yes   Sig: Apply topically 2 (two) times a day   fluvoxaMINE (LUVOX) 100 mg tablet 2020 at Unknown time Self Yes Yes   Sig: Take 100 mg by mouth 3 (three) times a day     lamoTRIgine (LaMICtal) 100 mg tablet 2020 at Unknown time Self Yes Yes   Sig: Take 400 mg by mouth daily at bedtime    linaCLOtide (LINZESS) 290 MCG CAPS 2020 at Unknown time Self No Yes   Sig: Take 1 capsule by mouth daily for 90 days   omeprazole (PriLOSEC) 40 MG capsule 2020 at Unknown time Self No Yes Sig: TAKE 1 CAPSULE BY MOUTH  DAILY AT BEDTIME   ondansetron (ZOFRAN) 4 mg tablet 2020 at Unknown time  No Yes   Sig: Take 1 tablet (4 mg total) by mouth every 8 (eight) hours as needed for nausea or vomiting for up to 4 days   traMADol (ULTRAM) 50 mg tablet Not Taking at Unknown time Self No No   Sig: Take 1 tablet (50 mg total) by mouth 2 (two) times a day   Patient not taking: Reported on 2020      Facility-Administered Medications: None       Past Medical History:   Diagnosis Date    Ankle sprain     left    Anxiety disorder     B[facility] 2 disorder ([facility])     Chronic back pain     CKD (chronic kidney disease) stage 3, GFR 30-59 ml/min ([facility])     stage 4 (as per pt)    CVA (cerebral vascular accident) ([address] )     noted on MRI in the past    Depression     GERD (gastroesophageal reflux disease)     Hypercholesteremia     Hyperlipidemia     Hypernatremia     Hypertension     Hypokalemia     Intervertebral disc disorder with radiculopathy of lumbosacral region     resolved: 2015    Kidney disease     Panic attacks     Pericardial effusion     PONV (postoperative nausea and vomiting)     Radiculitis     resolved: 2015    Secondary renal hyperparathyroidism ([address] )     Vitamin D deficiency        Past Surgical History:   Procedure Laterality Date    BUNIONECTOMY      Left foot     COLON SURGERY      COLONOSCOPY  2018    DILATION AND CURETTAGE OF UTERUS      INDUCED       surgically induced    CA ANASTOMOSIS,AV,ANY SITE Left 2019    Procedure: CREATION FISTULA ARTERIOVENOUS (AV) left wrists possible left upper;  Surgeon: Vicky Easton MD;  Location:  MAIN OR;  Service: Vascular    CA CORRJ HALLUX VALGUS W/SESMDC W/DIST METAR OSTEOT Left 2019    Procedure: Leafy Chandana;  Surgeon: Lydia Montes DPM;  [facility];  Service: Podiatry    CA ERCP DX COLLECTION SPECIMEN BRUSHING/WASHING N/A 2018    Procedure: ENDOSCOPIC RETROGRADE CHOLANGIOPANCREATOGRAPHY (ERCP); Surgeon: Charlotte Ko MD;  [facility];  Service: Gastroenterology    TN LAP, SURG PROCTOPEXY N/A 7/13/2018    Procedure: ROBOTIC SIGMOID RESECTION / RECTOPEXY;  Surgeon: Vicente Hopkins MD;  [facility];  Service: Colorectal    TN SIGMOIDOSCOPY FLX DX W/COLLJ SPEC BR/WA IF PFRMD N/A 7/13/2018    Procedure: Jonnathan Backbone;  Surgeon: Vicente Hopkins MD;  [facility];  Service: Colorectal    TUBAL LIGATION Bilateral [facility] THYROID BIOPSY  7/30/2019       Family History   Problem Relation Age of Onset    Bipolar disorder Mother     Mental illness Mother         depression    Stroke Mother     Dementia Mother     Heart disease Father     Hypertension Father     Diabetes Father     Other Family         Back disorder    Diabetes Family     Heart disease Family     Hypertension Family     Breast cancer Family     Stroke Family     Thyroid disease Family     Breast cancer Paternal Grandmother     Breast cancer Paternal [de-identified]     Breast cancer Maternal Aunt     Mental illness Sister     Mental illness Sister     Heart disease Sister     No Known Problems Sister     No Known Problems Sister     Other Son         pituitary tumor    Hypertension Son     Obesity Son     No Known Problems Son     Substance Abuse Neg Hx         neg fam hx     I have reviewed and agree with the history as documented  Social History     Tobacco Use    Smoking status: Never Smoker    Smokeless tobacco: Never Used   Substance Use Topics    Alcohol use: Never     Frequency: Never     Binge frequency: Never    Drug use: Not Currently     Types: Marijuana        Review of Systems   Constitutional: Positive for fatigue  Negative for chills, diaphoresis and fever  Respiratory: Negative for chest tightness and shortness of breath  Cardiovascular: Negative for chest pain and palpitations     Gastrointestinal: Positive for abdominal pain, nausea and vomiting  Negative for blood in stool, constipation and diarrhea  Genitourinary: Negative for dysuria, flank pain, frequency and hematuria  Musculoskeletal: Negative for arthralgias, back pain and myalgias  Skin: Negative for color change and rash  Neurological: Positive for weakness, light-headedness and headaches  Negative for dizziness  Hematological: Does not bruise/bleed easily  Psychiatric/Behavioral: Negative for confusion  Physical Exam  Physical Exam   Constitutional: She is oriented to person, place, and time  She appears well-developed and well-nourished  No distress  HENT:   Head: Normocephalic and atraumatic  Mouth/Throat: Uvula is midline  Mucous membranes are dry  Eyes: Pupils are equal, round, and reactive to light  No scleral icterus  Neck: No JVD present  Cardiovascular: Regular rhythm  Tachycardia present  Exam reveals no gallop and no friction rub  No murmur heard  Pulmonary/Chest: No respiratory distress  She has no wheezes  She has no rales  Abdominal: Soft  Bowel sounds are normal  She exhibits no distension and no mass  There is no tenderness  There is no rebound and no guarding  Musculoskeletal: She exhibits no edema  Neurological: She is alert and oriented to person, place, and time  No cranial nerve deficit  Skin: Skin is warm and dry  Capillary refill takes less than 2 seconds  She is not diaphoretic  No pallor  Psychiatric: She has a normal mood and affect  Her behavior is normal    Vitals reviewed        Vital Signs  ED Triage Vitals   Temperature Pulse Respirations Blood Pressure SpO2   02/06/20 1047 02/06/20 1045 02/06/20 1045 02/06/20 1047 02/06/20 1045   97 8 °F (36 6 °C) (!) 113 20 127/63 100 %      Temp Source Heart Rate Source Patient Position - Orthostatic VS BP Location FiO2 (%)   02/06/20 1047 02/06/20 1045 02/06/20 1045 02/06/20 1045 --   Tympanic Monitor Sitting Right arm       Pain Score       02/06/20 1045       No Pain           Vitals:    02/06/20 1400 02/06/20 1430 02/06/20 1500 02/06/20 1638   BP: 131/67 124/63 125/65 153/83   Pulse: 79 79 79 79   Patient Position - Orthostatic VS: Lying            Visual Acuity  Visual Acuity      Most Recent Value   L Pupil Size (mm)  3   R Pupil Size (mm)  3          ED Medications  Medications   potassium chloride 20 mEq IVPB (premix) (20 mEq Intravenous New Bag 2/6/20 1529)   sodium chloride 0 9 % infusion (125 mL/hr Intravenous New Bag 2/6/20 1529)   sodium chloride 0 9 % bolus 500 mL (0 mL Intravenous Stopped 2/6/20 1229)   ondansetron (ZOFRAN) injection 4 mg (4 mg Intravenous Given 2/6/20 1057)   multi-electrolyte (ISOLYTE-S PH 7 4) bolus 1,000 mL (0 mL Intravenous Stopped 2/6/20 1429)   potassium chloride (K-DUR,KLOR-CON) CR tablet 20 mEq (20 mEq Oral Given 2/6/20 1228)   dicyclomine (BENTYL) tablet 20 mg (20 mg Oral Given 2/6/20 1258)       Diagnostic Studies  Results Reviewed     Procedure Component Value Units Date/Time    Urine Microscopic [[id number]]  (Abnormal) Collected:  02/06/20 1529    Lab Status:  Final result Specimen:  Urine, Clean Catch Updated:  02/06/20 1602     RBC, UA None Seen /hpf      WBC, UA 10-20 /hpf      Epithelial Cells Occasional /hpf      Bacteria, UA Occasional /hpf     Urine culture [[id number]] Collected:  02/06/20 1529    Lab Status:   In process Specimen:  Urine, Clean Catch Updated:  02/06/20 1601    UA w Reflex to Microscopic w Reflex to Culture [[id number]]  (Abnormal) Collected:  02/06/20 1529    Lab Status:  Final result Specimen:  Urine, Clean Catch Updated:  02/06/20 1549     Color, UA Yellow     Clarity, UA Clear     Specific Gravity, UA <=1 005     pH, UA 6 5     Leukocytes, UA Large     Nitrite, UA Negative     Protein, UA Negative mg/dl      Glucose, UA Negative mg/dl      Ketones, UA Negative mg/dl      Urobilinogen, UA 0 2 E U /dl      Bilirubin, UA Negative     Blood, UA Negative    Basic metabolic panel [[id number]]  (Abnormal) Collected:  02/06/20 1428    Lab Status:  Final result Specimen:  Blood from Arm, Right Updated:  02/06/20 1512     Sodium 143 mmol/L      Potassium 2 7 mmol/L      Chloride 112 mmol/L      CO2 17 mmol/L      ANION GAP 14 mmol/L      BUN 25 mg/dL      Creatinine 1 94 mg/dL      Glucose 86 mg/dL      Calcium 7 5 mg/dL      eGFR 28 ml/min/1 73sq m     Narrative:       National Kidney Disease Foundation guidelines for Chronic Kidney Disease (CKD):     Stage 1 with normal or high GFR (GFR > 90 mL/min/1 73 square meters)    Stage 2 Mild CKD (GFR = 60-89 mL/min/1 73 square meters)    Stage 3A Moderate CKD (GFR = 45-59 mL/min/1 73 square meters)    Stage 3B Moderate CKD (GFR = 30-44 mL/min/1 73 square meters)    Stage 4 Severe CKD (GFR = 15-29 mL/min/1 73 square meters)    Stage 5 End Stage CKD (GFR <15 mL/min/1 73 square meters)  Note: GFR calculation is accurate only with a steady state creatinine    Comprehensive metabolic panel [[id number]]  (Abnormal) Collected:  02/06/20 1055    Lab Status:  Final result Specimen:  Blood from Arm, Right Updated:  02/06/20 1127     Sodium 139 mmol/L      Potassium 3 1 mmol/L      Chloride 107 mmol/L      CO2 16 mmol/L      ANION GAP 16 mmol/L      BUN 29 mg/dL      Creatinine 2 71 mg/dL      Glucose 119 mg/dL      Calcium 9 5 mg/dL      AST 20 U/L      ALT 23 U/L      Alkaline Phosphatase 273 U/L      Total Protein 8 1 g/dL      Albumin 4 0 g/dL      Total Bilirubin 0 30 mg/dL      eGFR 19 ml/min/1 73sq m     Narrative:       National Kidney Disease Foundation guidelines for Chronic Kidney Disease (CKD):     Stage 1 with normal or high GFR (GFR > 90 mL/min/1 73 square meters)    Stage 2 Mild CKD (GFR = 60-89 mL/min/1 73 square meters)    Stage 3A Moderate CKD (GFR = 45-59 mL/min/1 73 square meters)    Stage 3B Moderate CKD (GFR = 30-44 mL/min/1 73 square meters)    Stage 4 Severe CKD (GFR = 15-29 mL/min/1 73 square meters)    Stage 5 End Stage CKD (GFR <15 mL/min/1 73 square meters)  Note: GFR calculation is accurate only with a steady state creatinine    Magnesium [[id number]]  (Normal) Collected:  02/06/20 1055    Lab Status:  Final result Specimen:  Blood from Arm, Right Updated:  02/06/20 1127     Magnesium 2 1 mg/dL     Phosphorus [[id number]]  (Normal) Collected:  02/06/20 1055    Lab Status:  Final result Specimen:  Blood from Arm, Right Updated:  02/06/20 1127     Phosphorus 3 8 mg/dL     CBC and differential [[id number]]  (Abnormal) Collected:  02/06/20 1056    Lab Status:  Final result Specimen:  Blood from Arm, Right Updated:  02/06/20 1117     WBC 5 89 Thousand/uL      RBC 4 43 Million/uL      Hemoglobin 14 1 g/dL      Hematocrit 43 7 %      MCV 99 fL      MCH 31 8 pg      MCHC 32 3 g/dL      RDW 12 6 %      MPV 11 0 fL      Platelets 234 Thousands/uL      nRBC 0 /100 WBCs      Neutrophils Relative 59 %      Immat GRANS % 0 %      Lymphocytes Relative 21 %      Monocytes Relative 7 %      Eosinophils Relative 11 %      Basophils Relative 2 %      Neutrophils Absolute 3 44 Thousands/µL      Immature Grans Absolute 0 02 Thousand/uL      Lymphocytes Absolute 1 21 Thousands/µL      Monocytes Absolute 0 42 Thousand/µL      Eosinophils Absolute 0 67 Thousand/µL      Basophils Absolute 0 13 Thousands/µL                  CT abdomen pelvis wo contrast   Final Result by Papito John MD (02/06 1216)      Mild air fluid distention of the right hemicolon to the level of the splenic flexure without discrete obstructing abnormality, wall thickening or inflammation evident  Grossly stable partially exophytic nodule left lower pole                 Workstation performed: [id number]                    Procedures  ECG 12 Lead Documentation Only  Date/Time: 2/6/2020 10:56 AM  Performed by: Justina Lal PA-C  Authorized by: Justina Lal PA-C     Indications / Diagnosis:  Vomiting/weakness  ECG reviewed by me, the ED Provider: yes    Patient location:  ED  Previous ECG:     Previous ECG:  Compared to current    Similarity:  No change  Interpretation:     Interpretation: non-specific    Rate:     ECG rate:  97    ECG rate assessment: tachycardic    Rhythm:     Rhythm: sinus tachycardia    Ectopy:     Ectopy: none    QRS:     QRS axis:  Right    QRS intervals:  Normal  Conduction:     Conduction: normal    ST segments:     ST segments:  Normal  T waves:     T waves: normal    Comments:      QTc 520, slightly increased compared to prior             ED Course                               MDM  Number of Diagnoses or Management Options  Acute kidney injury [facility]): new and requires workup  Hypokalemia:   Vomiting: new and requires workup  Diagnosis management comments: 21-year-old female presents with vomiting and resultant acute kidney injury  Initial labs revealed a creatinine elevation with hypokalemia at 3 1  CT reveals no acute pathology suggesting a source or vomiting  She did not vomit in the emergency department nausea was controlled  After IV hydration with isolated and oral potassium supplementation, the patient's potassium level unfortunately declined to 2 7  She had no point had any EKG changes    She will be placed in observation for ongoing IV potassium supplementation       Amount and/or Complexity of Data Reviewed  Clinical lab tests: ordered and reviewed  Tests in the radiology section of CPT®: ordered and reviewed  Tests in the medicine section of CPT®: ordered and reviewed  Review and summarize past medical records: yes  Discuss the patient with other providers: yes  Independent visualization of images, tracings, or specimens: yes          Disposition  Final diagnoses:   Acute kidney injury ([address] )   Vomiting   Hypokalemia     Time reflects when diagnosis was documented in both MDM as applicable and the Disposition within this note     Time User Action Codes Description Comment    2/6/2020  3:23 PM Lexx Vu Add [N17 9] Acute kidney injury ([address] )     2/6/2020  3:23 PM Jonas Flores Derick Steen Add [R11 10] Vomiting     2/6/2020  3:24 PM Joan Crawford Add [E87 6] Hypokalemia       ED Disposition     ED Disposition Condition Date/Time Comment    Admit Stable Thu Feb 6, 2020  3:37 PM Case was discussed with Dr Kimmy Bowman and the patient's admission status was agreed to be Admission Status: observation status to the service of Dr Kimmy Bowman   Follow-up Information    None         Current Discharge Medication List      CONTINUE these medications which have NOT CHANGED    Details   AMILoride 5 mg tablet Take 5 mg by mouth 2 (two) times a day  Refills: 3      aspirin (ECOTRIN LOW STRENGTH) 81 mg EC tablet Take 1 tablet (81 mg total) by mouth daily  Qty: 30 tablet, Refills: 0    Associated Diagnoses: Infarction of left basal ganglia (HCC)      atorvastatin (LIPITOR) 40 mg tablet Take 1 tablet (40 mg total) by mouth daily  Qty: 30 tablet, Refills: 5    Associated Diagnoses: Elevated LFTs      carvedilol (COREG) 3 125 mg tablet TAKE 1 TABLET BY MOUTH TWICE DAILY WITH MEALS  Qty: 60 tablet, Refills: 2    Comments: Please consider 90 day supplies to promote better adherence  Associated Diagnoses: Secondary renal hyperparathyroidism ([facility] ); Essential hypertension; Chronic kidney disease, stage 3 ([facility] );  Renal cyst; Diabetes insipidus (HCC)      clonazePAM (KlonoPIN) 0 5 mg tablet Take 1 tablet (0 5 mg total) by mouth 3 (three) times a day  Qty: 270 tablet, Refills: 0    Associated Diagnoses: Bipolar 2 disorder (HCC)      fluocinonide (LIDEX) 0 05 % ointment Apply topically 2 (two) times a day  Qty: 60 g, Refills: 1    Associated Diagnoses: Flexural eczema      fluvoxaMINE (LUVOX) 100 mg tablet Take 100 mg by mouth 3 (three) times a day        lamoTRIgine (LaMICtal) 100 mg tablet Take 400 mg by mouth daily at bedtime       linaCLOtide (LINZESS) 290 MCG CAPS Take 1 capsule by mouth daily for 90 days  Qty: 90 capsule, Refills: 3    Associated Diagnoses: Constipation, unspecified constipation type omeprazole (PriLOSEC) 40 MG capsule TAKE 1 CAPSULE BY MOUTH  DAILY AT BEDTIME  Qty: 90 capsule, Refills: 3    Associated Diagnoses: Gastroesophageal reflux disease, esophagitis presence not specified      ondansetron (ZOFRAN) 4 mg tablet Take 1 tablet (4 mg total) by mouth every 8 (eight) hours as needed for nausea or vomiting for up to 4 days  Qty: 12 tablet, Refills: 0    Associated Diagnoses: Nausea and vomiting, intractability of vomiting not specified, unspecified vomiting type      !! QUEtiapine (SEROQUEL) 300 mg tablet Take 1 tablet (300 mg total) by mouth every morning 1 in the AM     ( also takes 400 mg at bedtime)  Qty: 90 tablet, Refills: 3    Associated Diagnoses: Bipolar 2 disorder (HCC)      !! QUEtiapine (SEROquel) 400 MG tablet 1 at bedtime      acetaminophen (TYLENOL) 325 mg tablet Take 650 mg by mouth every 6 (six) hours as needed for mild pain      albuterol (PROAIR HFA) 90 mcg/act inhaler Inhale 2 puffs every 6 (six) hours as needed for wheezing  Qty: 1 Inhaler, Refills: 5    Comments: Substitution to a formulary equivalent within the same pharmaceutical class is authorized  Associated Diagnoses: Moderate persistent asthma without complication      budesonide-formoterol (SYMBICORT) 160-4 5 mcg/act inhaler Inhale 2 puffs 2 (two) times a day Rinse mouth after use  Qty: 3 Inhaler, Refills: 3    Associated Diagnoses: SOB (shortness of breath)      Polyethylene Glycol 3350 (MIRALAX PO) Take by mouth as needed       traMADol (ULTRAM) 50 mg tablet Take 1 tablet (50 mg total) by mouth 2 (two) times a day  Qty: 20 tablet, Refills: 0    Associated Diagnoses: Bilateral sacroiliitis ([address] )       ! ! - Potential duplicate medications found  Please discuss with provider  No discharge procedures on file      ED Provider  Electronically Signed by           Lynette Frias PA-C  02/06/20 8624 Date of Birth: [birth year]	  Admission Weight (g): 1243    Admission Date and [facility], to [facility], to [facility] for procedure and return to [facility]    HPI: This is an  ex 24 week infant back-transferred to [facility] from [facility] for [facility]IA[facility]. Baby Solo is 24.6 wk infant born to a 31 y.o. , O negative all other PNL unremarkable. Maternal hx of thyroidectomy (hypothyroid on synthroid), type 2 diabetes on metformin, lap cholecystectomy. OBhx: c/s () 32 weeks- PPROM, Hx of abnormal paps and ovarian cysts and STIs.  NO prenatal care with this pregnancy. Mother presented at [facility] with abruption, stat C/S, intubated in DR, Apgars 2/7, curosurf given at [facility] and transferred to [facility] NICU Course:  Respiratory : S/P intubation (SIMV), extubated () to BCPAP. hx of apnea - on caffeine (10 mg/kg). Now on BCPAP 5, 21%.  Cardio: LG PDA with reversal of flow- s/p IB prophen x2 courses (- AND -).   FEN: hx of GERD and episodes with feed. s/p UA and UV, S/P PICC (d/c )- s/p tpn. Now feeding FEHM 24 kcal with 2 packs HMF/50 mL + 1 mL MCT oil q12 hours at  23 mL n5ollrr over 90 min (). On PVS/Fe.  Heme: hx of anemia (PRBC ), Hx of hyperbilirubinemia s/p photo.   ID: s/p presumed sepsis. S/P amp/gent (-).  BCx (sent at [facility]) negative.   Neuro: HUS at 1 week (): bilateral Grade II IVH. Repeat  b/l IVH with increased dilation of the lateral ventricles, intraparenchymal  small bleed  Repeat : unchanged. Follow up 1 month.    ENDO: Maternal hypothyroidism. TFT  - WNL. Follow with endocrinology- needs endo consult  other: UTOX negative   Optho: At risk for ROP due to birth weight <1500g and/or GA < 31wk. For ROP screening at 4 weeks of age/31 weeks PMA.       Social History: No history of alcohol/tobacco exposure obtained  FHx: non-contributory to the condition being treated or details of FH documented here  ROS: unable to obtain ()

## 2022-01-03 ENCOUNTER — OFFICE VISIT (OUTPATIENT)
Dept: FAMILY MEDICINE CLINIC | Facility: HOSPITAL | Age: 62
End: 2022-01-03
Payer: MEDICARE

## 2022-01-03 ENCOUNTER — APPOINTMENT (OUTPATIENT)
Dept: PHYSICAL THERAPY | Facility: CLINIC | Age: 62
End: 2022-01-03
Payer: MEDICARE

## 2022-01-03 ENCOUNTER — TELEPHONE (OUTPATIENT)
Dept: FAMILY MEDICINE CLINIC | Facility: HOSPITAL | Age: 62
End: 2022-01-03

## 2022-01-03 VITALS
WEIGHT: 218.4 LBS | HEIGHT: 67 IN | HEART RATE: 96 BPM | DIASTOLIC BLOOD PRESSURE: 94 MMHG | OXYGEN SATURATION: 95 % | SYSTOLIC BLOOD PRESSURE: 172 MMHG | BODY MASS INDEX: 34.28 KG/M2

## 2022-01-03 DIAGNOSIS — K86.1 IDIOPATHIC CHRONIC PANCREATITIS (HCC): ICD-10-CM

## 2022-01-03 DIAGNOSIS — M25.512 ACUTE PAIN OF LEFT SHOULDER: ICD-10-CM

## 2022-01-03 DIAGNOSIS — R51.9 ACUTE NONINTRACTABLE HEADACHE, UNSPECIFIED HEADACHE TYPE: ICD-10-CM

## 2022-01-03 DIAGNOSIS — I77.0 AVF (ARTERIOVENOUS FISTULA) (HCC): ICD-10-CM

## 2022-01-03 DIAGNOSIS — F31.4 BIPOLAR 1 DISORDER, DEPRESSED, SEVERE (HCC): ICD-10-CM

## 2022-01-03 DIAGNOSIS — J45.40 MODERATE PERSISTENT ASTHMA WITHOUT COMPLICATION: ICD-10-CM

## 2022-01-03 DIAGNOSIS — I10 ACCELERATED HYPERTENSION: Primary | ICD-10-CM

## 2022-01-03 DIAGNOSIS — N28.1 RENAL CYST: ICD-10-CM

## 2022-01-03 DIAGNOSIS — M46.1 BILATERAL SACROILIITIS (HCC): ICD-10-CM

## 2022-01-03 DIAGNOSIS — Z23 NEED FOR INFLUENZA VACCINATION: ICD-10-CM

## 2022-01-03 DIAGNOSIS — N18.30 CHRONIC KIDNEY DISEASE, STAGE 3 (HCC): ICD-10-CM

## 2022-01-03 DIAGNOSIS — I10 HTN (HYPERTENSION): ICD-10-CM

## 2022-01-03 DIAGNOSIS — I10 ESSENTIAL HYPERTENSION: ICD-10-CM

## 2022-01-03 DIAGNOSIS — H92.01 EARACHE ON RIGHT: ICD-10-CM

## 2022-01-03 DIAGNOSIS — E23.2 DIABETES INSIPIDUS (HCC): ICD-10-CM

## 2022-01-03 DIAGNOSIS — N18.4 STAGE 4 CHRONIC KIDNEY DISEASE (HCC): ICD-10-CM

## 2022-01-03 DIAGNOSIS — N25.81 SECONDARY RENAL HYPERPARATHYROIDISM (HCC): ICD-10-CM

## 2022-01-03 PROCEDURE — 90682 RIV4 VACC RECOMBINANT DNA IM: CPT

## 2022-01-03 PROCEDURE — 99214 OFFICE O/P EST MOD 30 MIN: CPT | Performed by: INTERNAL MEDICINE

## 2022-01-03 PROCEDURE — G0008 ADMIN INFLUENZA VIRUS VAC: HCPCS

## 2022-01-03 RX ORDER — BUDESONIDE AND FORMOTEROL FUMARATE DIHYDRATE 160; 4.5 UG/1; UG/1
2 AEROSOL RESPIRATORY (INHALATION) 2 TIMES DAILY
Qty: 30.6 G | Refills: 3 | Status: ON HOLD | OUTPATIENT
Start: 2022-01-03 | End: 2022-05-27 | Stop reason: ALTCHOICE

## 2022-01-03 RX ORDER — CARVEDILOL 12.5 MG/1
12.5 TABLET ORAL 2 TIMES DAILY WITH MEALS
Qty: 180 TABLET | Refills: 3 | Status: SHIPPED | OUTPATIENT
Start: 2022-01-03 | End: 2022-02-14 | Stop reason: SDUPTHER

## 2022-01-03 RX ORDER — AMILORIDE HYDROCHLORIDE 5 MG/1
5 TABLET ORAL 2 TIMES DAILY
Qty: 180 TABLET | Refills: 3 | Status: SHIPPED | OUTPATIENT
Start: 2022-01-03

## 2022-01-03 NOTE — TELEPHONE ENCOUNTER
Patient seen in [address] [place] [address] ER 12/30 for high BP after speaking to office  Says BP is starting to go up again, 160/90  Asking for a nurse to return her call

## 2022-01-03 NOTE — ASSESSMENT & PLAN NOTE
Lab Results   Component Value Date    EGFR 22 12/30/2021    EGFR 21 12/16/2021    EGFR 22 10/11/2021    CREATININE 2 29 (H) 12/30/2021    CREATININE 2 36 (H) 12/16/2021    CREATININE 2 31 (H) 10/11/2021    Seeing Dr Jaelyn Ramos- repeat labs to be done in [place]

## 2022-01-03 NOTE — ASSESSMENT & PLAN NOTE
Seen by Dr Nate Xavier for rig wrist surgery in October   she did not mention about her left shoulder pain issues

## 2022-01-05 ENCOUNTER — APPOINTMENT (OUTPATIENT)
Dept: PHYSICAL THERAPY | Facility: CLINIC | Age: 62
End: 2022-01-05
Payer: MEDICARE

## 2022-01-07 ENCOUNTER — TELEPHONE (OUTPATIENT)
Dept: FAMILY MEDICINE CLINIC | Facility: HOSPITAL | Age: 62
End: 2022-01-07

## 2022-01-07 DIAGNOSIS — M20.5X1 CLAW TOE, ACQUIRED, RIGHT: ICD-10-CM

## 2022-01-07 DIAGNOSIS — M20.11 ACQUIRED HALLUX INTERPHALANGEUS, RIGHT: ICD-10-CM

## 2022-01-07 DIAGNOSIS — S99.921A INJURY OF PLANTAR PLATE, RIGHT, INITIAL ENCOUNTER: ICD-10-CM

## 2022-01-07 RX ORDER — ONDANSETRON 4 MG/1
4 TABLET, FILM COATED ORAL EVERY 8 HOURS PRN
Qty: 20 TABLET | Refills: 1 | Status: SHIPPED | OUTPATIENT
Start: 2022-01-07 | End: 2022-02-24 | Stop reason: SDUPTHER

## 2022-01-10 ENCOUNTER — APPOINTMENT (OUTPATIENT)
Dept: PHYSICAL THERAPY | Facility: CLINIC | Age: 62
End: 2022-01-10
Payer: MEDICARE

## 2022-01-11 ENCOUNTER — OFFICE VISIT (OUTPATIENT)
Dept: OCCUPATIONAL THERAPY | Facility: CLINIC | Age: 62
End: 2022-01-11
Payer: MEDICARE

## 2022-01-11 ENCOUNTER — OFFICE VISIT (OUTPATIENT)
Dept: PHYSICAL THERAPY | Facility: CLINIC | Age: 62
End: 2022-01-11
Payer: MEDICARE

## 2022-01-11 DIAGNOSIS — R26.89 BALANCE DISORDER: Primary | ICD-10-CM

## 2022-01-11 DIAGNOSIS — Z47.89 AFTERCARE FOLLOWING SURGERY OF THE MUSCULOSKELETAL SYSTEM: Primary | ICD-10-CM

## 2022-01-11 DIAGNOSIS — S52.531A CLOSED COLLES' FRACTURE OF RIGHT RADIUS, INITIAL ENCOUNTER: ICD-10-CM

## 2022-01-11 PROCEDURE — 97140 MANUAL THERAPY 1/> REGIONS: CPT | Performed by: OCCUPATIONAL THERAPIST

## 2022-01-11 PROCEDURE — 97110 THERAPEUTIC EXERCISES: CPT | Performed by: OCCUPATIONAL THERAPIST

## 2022-01-11 PROCEDURE — 97110 THERAPEUTIC EXERCISES: CPT

## 2022-01-11 PROCEDURE — 97112 NEUROMUSCULAR REEDUCATION: CPT

## 2022-01-11 NOTE — PROGRESS NOTES
Daily Note     Today's date: 2022  Patient name: Gabriela Sena  : 1960  MRN: [id number]  Referring provider: Mateusz Bustos DO  Dx:   Encounter Diagnosis     ICD-10-CM    1  Aftercare following surgery of the musculoskeletal system  Z47 89    2  Closed Colles' fracture of right radius, initial encounter  S52 531A                   Subjective: I was not in because my BP was elevated      Objective: See treatment diary below      Assessment: Tolerated treatment well  Patient has improved tolerance to activity   Plan: Continue per plan of care  Precautions: Fall risk, RUE wrist fx, LUE fistula  Re-eval Date: 2022     Daily Note     Today's date: 2022  Patient name: Gabriela Sena  : 1960  MRN: [id number]  Referring provider: Mateusz Bustos DO  Dx:   Encounter Diagnosis     ICD-10-CM    1  Aftercare following surgery of the musculoskeletal system  Z47 89    2  Closed Colles' fracture of right radius, initial encounter  S52 531A                   Subjective: I am getting better      Objective: See treatment diary below      Assessment: Tolerated treatment well  Patient is progressing with strength and carryover  for ADL   Plan: Continue per plan of care    Pt sees MD this week       Precautions: Fall risk, RUE wrist fx, LUE fistula  Re-eval Date: 2021      Manuals 12/22 12/29  1/11 11/22 12/1 12/6 12/8 12/13 12/15 12/20   graston wrist  4m 4m 4m 4m 4m 4m 4m 4m 4m 4m   scar STM 2m 2m 2m 2m 2m 2m 2m 2m 2m 2m   retorgrade  4m 4m 4m 4m 4m 4m 4m 4m 4m 4m                HEP  5x day                                                                                                        Ther Ex             A/PROM R wrist  2m 2m 2m 2m 2m 2m 2m 2m 2m 2m   A/PROM  P/S 2m 2m 2m 2m 2m 2m 2m 2m 2m 2m   Wrist e/f  30x 30x 30x 1#  3x10 #1 3x10 1#  3x10 1#  3x10 1#  3x10 1#  3x10 2#  3x10   powerweb  Black  3x10 black  3x10 Red  3x10 blue  4x10 Blue 2x30 Blue  2x30 Blue  2x30 Blue  2x30 Black  2x30 Black  3x10   flexbar P/S Red  3x10 Red  3x10 Red  3x10    Red  3x10 Red  3x10 Red  3x10 Red  3x10   Biceps  3#  x10 4#  3x10 4#  3x10    2#  3x10 2#  3x10 3#  3x10 3#  3x10   UBE   6m                         Ther Activity             Pegs grooved  40x 40x 40x 40x 1 set 1 set  1 set 1 set     Pinch pins  B;ack  3x10   Black  3x10 blue  3x10 Blue 3x10 Blue  6x10 Blue  6x10 Blue  6x10 Blue  6x10 Black  6x10   fxnl grasp   2 2#  3x10     2 2# ball  1x10  2 2#  1x10 2 2#  1x10                             Modalities             MH  8m 8m 8m 8m 8m 8m 8m 8m 8m 8m   CP  5m 5m 5m    5m 5m 5m 5m 5m                              Manuals 12/22 12/29  11/18 11/22 12/1 12/6 12/8 12/13 12/15 12/20   graston wrist  4m 4m 4m 4m 4m 4m 4m 4m 4m 4m   scar STM 2m 2m 2m 2m 2m 2m 2m 2m 2m 2m   retorgrade  4m 4m 4m 4m 4m 4m 4m 4m 4m 4m                HEP  5x day                                                                                                        Ther Ex             A/PROM R wrist  2m 2m 2m 2m 2m 2m 2m 2m 2m 2m   A/PROM  P/S 2m 2m 2m 2m 2m 2m 2m 2m 2m 2m   Wrist e/f  30x 30x 30x 1#  3x10 #1 3x10 1#  3x10 1#  3x10 1#  3x10 1#  3x10 2#  3x10   powerweb  Black  3x10 black  3x10 Red  3x10 blue  4x10 Blue 2x30 Blue  2x30 Blue  2x30 Blue  2x30 Black  2x30 Black  3x10   flexbar P/S Red  3x10 Red  3x10     Red  3x10 Red  3x10 Red  3x10 Red  3x10   Biceps  3#  x10 4#  3x10     2#  3x10 2#  3x10 3#  3x10 3#  3x10   UBE   6m                        Ther Activity             Pegs grooved  40x 40x 40x 40x 1 set 1 set  1 set 1 set     Pinch pins  B;ack  3x10 Yellow  30x Red  3x10 blue  3x10 Blue 3x10 Blue  6x10 Blue  6x10 Blue  6x10 Blue  6x10 Black  6x10   fxnl grasp   2 2#  3x10     2 2# ball  1x10  2 2#  1x10 2 2#  1x10                             Modalities             MH  8m 8m 8m 8m 8m 8m 8m 8m 8m 8m   CP  5m 5m 5m    5m 5m 5m 5m

## 2022-01-11 NOTE — PROGRESS NOTES
Daily Note     Today's date: 2022  Patient name: Mary Jo Lynch  : 1960  MRN: [id number]  Referring provider: Rusty Winston DO  Dx:   Encounter Diagnosis     ICD-10-CM    1  Balance disorder  R26 89        Start Time:   Stop Time:   Total time in clinic (min): 48 minutes    Subjective: Pt reports that she went to the ED d/t her recent bouts of high blood pressure  Followed up with her PCP, reports they increased her BP medication  BP is slowly coming down over time but still remains elevated  Objective: See treatment diary below  Pre session BP: 156/82  Post session BP: 161/96    Assessment: Pt BP stable throughout session  Eased back into balance therapy with complaints of more knee pain & high BP recently  Shows some instability with balance exercises since her hiatus  Difficulty with single limb stance with cone taps and coordinating movement over hurdles  Will continue to increase balance exercise challenge as able with solo step  Cues for increased gait speed with balance exercises  Educated on benefits of increasing walking while remaining safe  Pt verbalized understanding  Patient would benefit from continued PT      Plan: Continue per plan of care  Continue to increase balance challenge as possible  Monitor vitals  Work on carrying objects with balance exercises       Precautions: Fall risk, RUE wrist fx, LUE fistula  Re-eval Date: 2022    Date 12/15 12/20 12/22 12/29 1/11   Visit Count 6 7 8 9 10   FOTO     performed   Pain In        Pain Out                Testing        Gait speed 1 09 m/s       TUG 07 69       5xSTS 14 69       FGA 21/30       6MWT 1213ft       Neuro Re-Ed        Dynamic balance Fwd/lat tandem foam beams with low hurdles 8 laps ea cues head/eye    Boomwhackers walking fwd/bwd reaching 8 laps 6' in step under mat, uneven surface 10 laps    multidirectional step with cone taps 5min    Foam beam with hurdles fwd 8 laps    Foam beam bwd no hurdles 3 laps Tam Fierro toss with basic cog tasks 10min fwd/bwd gait    Cog task with tandem gait with hurdles 8 laps     FGA, 5xSTS Lateral steps foam // bars no UE 6 laps    Hurdles with PT cues for cone taps between hurdles - 8 laps    HT fwd/bwd 20ft 6 laps ea   Static balance Reaching for cones on ground from foam 3x6 Foam wedge ball toss turns on wedge 10min Foam  Wedge ball toss turns on wedge 8min    FT HT 30', 45' foam    FT EC 30', 45' foam Foam FT  EC 60' x1  HT/HN 60' x1    EC HT 45' x1 Foam FT   EO 60' x1    HT/HN 60' x1    EC 60' x1   Obstacle Course Bosu ball step over, uneven surface, turn in hula hoop 10 laps  Hurdles with 2 foam obstacle navigation 10 min     Developmental position 1/2 kneel chops LUE x20 - instructed on getting back to mat safely 1UE                               Ther Ex        SLR x 3 in standing    BTB x20 ext    HR/TR        Mini Squats        Sit to stands  Peanut  Ball 1x10 hold     Step ups     Fwd 2x12 6'    Knee ext  2x10 ea 7 5aw   2x10 ea 7 5aw 5'   Vitals    Performed R UE secondary to fistula on LUE Performed RUE                   Ther Activity        ADL                Gait Training        Divided Attention        Head turns        Modalities         prn

## 2022-01-12 ENCOUNTER — APPOINTMENT (OUTPATIENT)
Dept: PHYSICAL THERAPY | Facility: CLINIC | Age: 62
End: 2022-01-12
Payer: MEDICARE

## 2022-01-13 ENCOUNTER — APPOINTMENT (OUTPATIENT)
Dept: OCCUPATIONAL THERAPY | Facility: CLINIC | Age: 62
End: 2022-01-13
Payer: MEDICARE

## 2022-01-13 ENCOUNTER — APPOINTMENT (OUTPATIENT)
Dept: PHYSICAL THERAPY | Facility: CLINIC | Age: 62
End: 2022-01-13
Payer: MEDICARE

## 2022-01-17 ENCOUNTER — APPOINTMENT (OUTPATIENT)
Dept: PHYSICAL THERAPY | Facility: CLINIC | Age: 62
End: 2022-01-17
Payer: MEDICARE

## 2022-01-17 ENCOUNTER — APPOINTMENT (OUTPATIENT)
Dept: OCCUPATIONAL THERAPY | Facility: CLINIC | Age: 62
End: 2022-01-17
Payer: MEDICARE

## 2022-01-18 ENCOUNTER — OFFICE VISIT (OUTPATIENT)
Dept: FAMILY MEDICINE CLINIC | Facility: HOSPITAL | Age: 62
End: 2022-01-18
Payer: MEDICARE

## 2022-01-18 VITALS
BODY MASS INDEX: 34.71 KG/M2 | DIASTOLIC BLOOD PRESSURE: 82 MMHG | WEIGHT: 221.6 LBS | SYSTOLIC BLOOD PRESSURE: 144 MMHG | HEART RATE: 90 BPM | OXYGEN SATURATION: 95 %

## 2022-01-18 DIAGNOSIS — I10 PRIMARY HYPERTENSION: ICD-10-CM

## 2022-01-18 DIAGNOSIS — H61.23 EXCESSIVE CERUMEN IN EAR CANAL, BILATERAL: Primary | ICD-10-CM

## 2022-01-18 DIAGNOSIS — K21.9 GASTROESOPHAGEAL REFLUX DISEASE, UNSPECIFIED WHETHER ESOPHAGITIS PRESENT: ICD-10-CM

## 2022-01-18 PROCEDURE — 69209 REMOVE IMPACTED EAR WAX UNI: CPT | Performed by: STUDENT IN AN ORGANIZED HEALTH CARE EDUCATION/TRAINING PROGRAM

## 2022-01-18 PROCEDURE — 99214 OFFICE O/P EST MOD 30 MIN: CPT | Performed by: STUDENT IN AN ORGANIZED HEALTH CARE EDUCATION/TRAINING PROGRAM

## 2022-01-18 NOTE — PATIENT INSTRUCTIONS
Can resume omeprazole/Prilosec 40 mg daily for heartburn discomfort, GERD      Not related to receive a statin/Crestor for blood cholesterol levels

## 2022-01-18 NOTE — PROGRESS NOTES
[facility],     Assessment/Plan:      Diagnosis ICD-10-CM Associated Orders   1  Excessive cerumen in ear canal, bilateral  H61 23 Ear cerumen removal   2  Gastroesophageal reflux disease, unspecified whether esophagitis present  K21 9    3  Primary hypertension  I10       No change to meds at this time  C/W BP monitoring  F/U as needed  Appt with PCP coming up   Patient may call or return to office with any questions or concerns  ____________________________________________________________________  Subjective:     Patient ID: Augusto Horne is a 63 y o  female  HPI  Mynor Moralez  Taking amiloride 5mg bid currently & coreg 12 5 mg bid  Baby ASA daily  SLUB ED on 12/3021 - "blood pressures been elevated to approximately 200/100 several times over last 3 days " In /101 & later 164/79  Home BP's 160-180's/98 or so  Using upper arm cuff  Eyes hurt at times & head  Glasses are brand new  The following portions of the patient's history were reviewed and updated as appropriate: allergies, current medications, past medical history and problem list     Review of Systems   Constitutional: Positive for chills (intermittently)  Negative for fever  HENT: Negative for congestion and sinus pain  Eyes: Positive for pain  Negative for photophobia and visual disturbance  Respiratory: Positive for cough (2/2 GERD)  Cardiovascular: Positive for palpitations (occasional)  Negative for chest pain  Gastrointestinal: Positive for nausea (with coughing)  Negative for vomiting  GERD pain   Neurological: Positive for headaches (chronically, 6/10 daily)  Negative for dizziness and light-headedness  Objective:      Vitals:    01/18/22 0947   BP: 144/82   Pulse: 90   SpO2: 95%      Physical Exam  Vitals reviewed  Constitutional:       General: She is not in acute distress  Appearance: Normal appearance  She is well-developed   She is not ill-appearing  HENT:      Head: Normocephalic and atraumatic  Ears:      Comments: Very narrow canals b/l  Cerumen debris b/l  No erythema  Eyes:      General: No scleral icterus  Right eye: No discharge  Left eye: No discharge  Cardiovascular:      Rate and Rhythm: Normal rate and regular rhythm  Pulses: Normal pulses  Heart sounds: Normal heart sounds  No murmur heard  No friction rub  Pulmonary:      Effort: Pulmonary effort is normal  No respiratory distress  Breath sounds: Normal breath sounds  No stridor  No wheezing  Musculoskeletal:      Cervical back: Normal range of motion  Skin:     General: Skin is warm  Neurological:      Mental Status: She is alert and oriented to person, place, and time  Psychiatric:         Thought Content: Thought content normal            Ear cerumen removal    Date/Time: 1/18/2022 10:23 AM  Performed by: Frank Boyce DO  Authorized by: Frank Boyce DO   Universal Protocol:  Consent: Verbal consent obtained  Risks and benefits: risks, benefits and alternatives were discussed  Consent given by: patient  Time out: Immediately prior to procedure a "time out" was called to verify the correct patient, procedure, equipment, support staff and site/side marked as required  Timeout called at: 1/18/2022 10:23 AM   Patient understanding: patient states understanding of the procedure being performed  Patient consent: the patient's understanding of the procedure matches consent given  Procedure consent: procedure consent matches procedure scheduled  Patient identity confirmed: verbally with patient      Patient location:  Clinic  Indications / Diagnosis:  Cerumen Impaction  Procedure details:     Location:  L ear and R ear    Procedure type: irrigation only      Approach:  External  Post-procedure details:     Complication:  None    Hearing quality:  Improved    Patient tolerance of procedure:   Tolerated well, no immediate complications  Comments:      Very narrow ear canals bilaterally, but large outcome of debris          Portions of the record may have been created with voice recognition software  Occasional wrong word or "sound alike" substitutions may have occurred due to the inherent limitations of voice recognition software  Please review the chart carefully and recognize, using context, where substitutions/typographical errors may have occurred

## 2022-01-19 ENCOUNTER — APPOINTMENT (OUTPATIENT)
Dept: PHYSICAL THERAPY | Facility: CLINIC | Age: 62
End: 2022-01-19
Payer: MEDICARE

## 2022-01-19 ENCOUNTER — APPOINTMENT (OUTPATIENT)
Dept: OCCUPATIONAL THERAPY | Facility: CLINIC | Age: 62
End: 2022-01-19
Payer: MEDICARE

## 2022-01-20 DIAGNOSIS — F31.81 BIPOLAR 2 DISORDER (HCC): Chronic | ICD-10-CM

## 2022-01-21 RX ORDER — CLONAZEPAM 0.5 MG/1
0.5 TABLET ORAL 3 TIMES DAILY
Qty: 270 TABLET | Refills: 0 | Status: SHIPPED | OUTPATIENT
Start: 2022-01-21 | End: 2022-04-07 | Stop reason: SDUPTHER

## 2022-01-24 ENCOUNTER — OFFICE VISIT (OUTPATIENT)
Dept: PHYSICAL THERAPY | Facility: CLINIC | Age: 62
End: 2022-01-24
Payer: MEDICARE

## 2022-01-24 DIAGNOSIS — R26.89 BALANCE DISORDER: Primary | ICD-10-CM

## 2022-01-24 DIAGNOSIS — F31.81 BIPOLAR 2 DISORDER (HCC): Chronic | ICD-10-CM

## 2022-01-24 PROCEDURE — 97112 NEUROMUSCULAR REEDUCATION: CPT

## 2022-01-24 PROCEDURE — 97140 MANUAL THERAPY 1/> REGIONS: CPT

## 2022-01-24 RX ORDER — CLONAZEPAM 0.5 MG/1
0.5 TABLET ORAL 3 TIMES DAILY
Qty: 270 TABLET | Refills: 0 | OUTPATIENT
Start: 2022-01-24

## 2022-01-24 NOTE — PROGRESS NOTES
Daily Note     Today's date: 2022  Patient name: Gisselle Templeton  : 1960  MRN: [id number]  Referring provider: Sonia Perea DO  Dx:   Encounter Diagnosis     ICD-10-CM    1  Balance disorder  R26 89                   Subjective: Pt reports she is going to be seeing a cardiologies in February  Pt states her BP has been high off and on  Pt state she BP has been fluctuating so much  Pt states she has a little neck ache today  Pt states he wrist and hand have also been bothering her more the last week or so  Pt states she will see her family doctor next Monday  Pt states she did have the flu last week and is still recovering from that  Objective: See treatment diary below  Pre session BP: 165/97 10 min seated rest break, 151/85   Post session BP: 161/91     Assessment: Pt presented to PT today with high BP of 165/97mmHG as noted above  Supervising PT was informed  Supervising PT instructed to reassess BP in 10 minutes  Pt BP decreased to 151/85mmHG as noted above but still high  Supervising PT instructed to preforming light exercise with rest breaks  Pt was educated on BP and instructed to reach out to her PCP again with updated numbers  Patient would benefit from continued PT      Plan: Continue per plan of care        Precautions: Fall risk, RUE wrist fx, LUE fistula  Re-eval Date: 2022    Date     Visit Count 7 8 9 10    FOTO    performed    Pain In        Pain Out                Testing         Gait speed        TUG        5xSTS        FGA        6MWT        Neuro Re-Ed        Dynamic balance 6' in step under mat, uneven surface 10 laps    multidirectional step with cone taps 5min    Foam beam with hurdles fwd 8 laps    Foam beam bwd no hurdles 3 laps Ball toss with basic cog tasks 10min fwd/bwd gait    Cog task with tandem gait with hurdles 8 laps     FGA, 5xSTS Lateral steps foam // bars no UE 6 laps    Hurdles with PT cues for cone taps between hurdles - 8 laps    HT fwd/bwd 20ft 6 laps ea    Static balance Foam wedge ball toss turns on wedge 10min Foam  Wedge ball toss turns on wedge 8min    FT HT 30', 45' foam    FT EC 30', 45' foam Foam FT  EC 60' x1  HT/HN 60' x1    EC HT 45' x1 Foam FT   EO 60' x1    HT/HN 60' x1    EC 60' x1 Foam FT   EO 60' x1    HT/HN 60' x1    EC 60' x1    Rest breaks between sets    Obstacle Course  Hurdles with 2 foam obstacle navigation 10 min      Developmental position                                Ther Ex        SLR x 3 in standing   BTB x20 ext     HR/TR        Mini Squats        Sit to stands Peanut  Ball 1x10 hold      Step ups    Fwd 2x12 6'     Knee ext 2x10 ea 7 5aw   2x10 ea 7 5aw 5'    Vitals   Performed R UE secondary to fistula on LUE Performed RUE Performed RUE                    Ther Activity        ADL                Gait Training        Divided Attention        Head turns        Modalities

## 2022-01-26 ENCOUNTER — APPOINTMENT (OUTPATIENT)
Dept: PHYSICAL THERAPY | Facility: CLINIC | Age: 62
End: 2022-01-26
Payer: MEDICARE

## 2022-01-27 ENCOUNTER — APPOINTMENT (OUTPATIENT)
Dept: PHYSICAL THERAPY | Facility: CLINIC | Age: 62
End: 2022-01-27
Payer: MEDICARE

## 2022-01-27 ENCOUNTER — OFFICE VISIT (OUTPATIENT)
Dept: OBGYN CLINIC | Facility: CLINIC | Age: 62
End: 2022-01-27
Payer: MEDICARE

## 2022-01-27 ENCOUNTER — APPOINTMENT (OUTPATIENT)
Dept: RADIOLOGY | Facility: CLINIC | Age: 62
End: 2022-01-27
Payer: MEDICARE

## 2022-01-27 ENCOUNTER — APPOINTMENT (OUTPATIENT)
Dept: OCCUPATIONAL THERAPY | Facility: CLINIC | Age: 62
End: 2022-01-27
Payer: MEDICARE

## 2022-01-27 VITALS
DIASTOLIC BLOOD PRESSURE: 80 MMHG | SYSTOLIC BLOOD PRESSURE: 148 MMHG | WEIGHT: 221 LBS | BODY MASS INDEX: 34.69 KG/M2 | HEIGHT: 67 IN

## 2022-01-27 DIAGNOSIS — M25.531 PAIN IN RIGHT WRIST: Primary | ICD-10-CM

## 2022-01-27 DIAGNOSIS — M25.531 PAIN IN RIGHT WRIST: ICD-10-CM

## 2022-01-27 PROCEDURE — 99213 OFFICE O/P EST LOW 20 MIN: CPT | Performed by: ORTHOPAEDIC SURGERY

## 2022-01-27 PROCEDURE — 73110 X-RAY EXAM OF WRIST: CPT

## 2022-01-27 NOTE — PROGRESS NOTES
Assessment:     1  Pain in right wrist        Plan:     Problem List Items Addressed This Visit     None      Visit Diagnoses     Pain in right wrist    -  Primary    Relevant Orders    XR wrist 3+ vw right        S/p right distal radius ORIF 10/11/21  Imaging reviewed with patient shows stable healed fracture with stable plate and screw fixation  Discussed the pain she is experiencing is soft tissue related  Continue with OT, also add in desensitization around incision  Maintain HEP and use wrist to tolerance  Follow up as needed for right wrist   All patient's questions were answered to her satisfaction  This note is created using dictation transcription  It may contain typographical errors, grammatical errors, improperly dictated words, background noise and other errors  Subjective:     Patient ID: Kerwin Colvin is a 63 y o  female  Chief Complaint: This is a 82-year-old white female who is status post right distal radius ORIF on October 11, 2021  She has attended 2 session of OT since last appt  Recently she is having volar wrist around incision and wrist joint with movement, use  Also notes burning sensation around incision  Denies any new injury or trauma  She is concerned something is wrong and is requesting xray  Denies any numbness or tingling in hand  She denies reinjury  She denies fever, chills, or sweats      Allergy:  Allergies   Allergen Reactions    Pollen Extract Nasal Congestion     Medications:  all current active meds have been reviewed  Past Medical History:  Past Medical History:   Diagnosis Date    Anxiety     Anxiety disorder     Bipolar 2 disorder ([facility] [address] )     Chronic back pain     Closed fracture of distal end of right fibula with routine healing 11/4/2020    COVID-19     in Jan 2021    [facility])     noted on MRI in the past    Depression     GERD (gastroesophageal reflux disease)     Hypercholesteremia     Hypernatremia     Hypertension  Hypokalemia     Idiopathic chronic pancreatitis ([facility] [address] ) 3/20/2018    Intervertebral disc disorder with radiculopathy of lumbosacral region     resolved: 2015    Kidney disease     Panic attacks     Pericardial effusion     PONV (postoperative nausea and vomiting)     Radiculitis     resolved: 2015    Secondary renal hyperparathyroidism ([facility] [address] )     Vitamin D deficiency      Past Surgical History:  Past Surgical History:   Procedure Laterality Date    BUNIONECTOMY      Left foot     COLON SURGERY      COLONOSCOPY  2021    DILATION AND CURETTAGE OF UTERUS      INDUCED       surgically induced    ND ANASTOMOSIS,AV,ANY SITE Left 2019    Procedure: CREATION FISTULA ARTERIOVENOUS (AV) left wrists possible left upper;  Surgeon: Constance Fuentes MD;  [facility];  Service: Vascular    ND [place] W/SESMDC W/DIST Cindy Numbers Left 2019    Procedure: Vergil Heads;  Surgeon: Leo Mays DPM;  [facility];  Service: [address] [place] [address] W/SESMDC W/DIST Cindy Numbers Right 8/3/2020    Procedure: Dol[place]is [place];  Surgeon: Leo Mays DPM;  [facility];  Service: Podiatry    ND [place] W/SESMDC Melania Lash PHLNX OSTEOT Right 2021    Procedure: Sudha Farideh, right tameka osteotomy and 2nd claw toe correction;  Surgeon: Gildardo Dasilva MD;  [facility];  Service: Orthopedics    ND ERCP DX COLLECTION SPECIMEN BRUSHING/WASHING N/A 2018    Procedure: ENDOSCOPIC RETROGRADE CHOLANGIOPANCREATOGRAPHY (ERCP);   Surgeon: Gigi Greco MD;  [facility];  Service: Gastroenterology    ND LAP, SURG PROCTOPEXY N/A 2018    Procedure: ROBOTIC SIGMOID RESECTION / RECTOPEXY;  Surgeon: Shasha Goetz MD;  [facility];  Service: Colorectal    ND OPEN TREATMENT RADIAL SHAFT FRACTURE Right 10/11/2021    Procedure: OPEN REDUCTION W/ INTERNAL FIXATION (ORIF) RADIUS (WRIST), RIGHT DISTAL;  Surgeon: Nadja Grullon MD;  Location:  MAIN OR;  Service: Orthopedics    VT SIGMOIDOSCOPY FLX DX W/COLLJ SPEC BR/WA IF PFRMD N/A 7/13/2018    Procedure: Anival Lyons;  Surgeon: Shasha Goetz MD;  Location:  MAIN OR;  Service: Colorectal    TUBAL LIGATION Bilateral [facility] THYROID BIOPSY  7/30/2019     Family History:  Family History   Problem Relation Age of Onset    Bipolar disorder Mother     Mental illness Mother         depression    Stroke Mother     Dementia Mother     Colon polyps Mother     Heart disease Father     Hypertension Father     Diabetes Father     Other Family         Back disorder    Diabetes Family     Heart disease Family     Hypertension Family     Stroke Family     Thyroid disease Family    [employer] Breast cancer Paternal [de-identified]         age unknown   [employer] Breast cancer Paternal [de-identified]         age unknown   [employer] Breast cancer Maternal Aunt         age unknown    Mental illness Sister     Colon polyps Sister     Mental illness Sister     Heart disease Sister     No Known Problems Sister     Breast cancer Sister 76    Other Son         pituitary tumor    Hypertension Son     Obesity Son     No Known Problems Son     No Known Problems Maternal Grandmother     No Known Problems Maternal Grandfather     No Known Problems Paternal Grandfather     Breast cancer Paternal Aunt         age unknown    Substance Abuse Neg Hx         neg fam hx    Colon cancer Neg Hx      Social History:  Social History     Substance and Sexual Activity   Alcohol Use Never     Social History     Substance and Sexual Activity   Drug Use Not Currently    Types: Marijuana     Social History     Tobacco Use   Smoking Status Never Smoker   Smokeless Tobacco Never Used     Review of Systems   Constitutional: Negative for chills and fever  HENT: Negative for ear pain and sore throat  Eyes: Negative for pain and visual disturbance     Respiratory: Negative for cough and shortness of breath  Cardiovascular: Negative for chest pain and palpitations  Gastrointestinal: Negative for abdominal pain and vomiting  Genitourinary: Negative for dysuria and hematuria  Musculoskeletal: Positive for arthralgias (Right wrist)  Negative for back pain and joint swelling  Skin: Negative for color change and rash  Neurological: Negative for seizures, syncope, weakness and numbness  Psychiatric/Behavioral: Negative  All other systems reviewed and are negative  Objective:  BP Readings from Last 1 Encounters:   01/27/22 148/80      Wt Readings from Last 1 Encounters:   01/27/22 100 kg (221 lb)      BMI:   Estimated body mass index is 34 61 kg/m² as calculated from the following:    Height as of this encounter: 5' 7" (1 702 m)  Weight as of this encounter: 100 kg (221 lb)  BSA:   Estimated body surface area is 2 11 meters squared as calculated from the following:    Height as of this encounter: 5' 7" (1 702 m)  Weight as of this encounter: 100 kg (221 lb)  Physical Exam  Vitals and nursing note reviewed  Constitutional:       Appearance: Normal appearance  She is well-developed  HENT:      Head: Normocephalic and atraumatic  Right Ear: External ear normal       Left Ear: External ear normal    Eyes:      Extraocular Movements: Extraocular movements intact  Conjunctiva/sclera: Conjunctivae normal    Pulmonary:      Effort: Pulmonary effort is normal    Musculoskeletal:      Cervical back: Neck supple  Skin:     General: Skin is warm and dry  Neurological:      Mental Status: She is alert and oriented to person, place, and time  Deep Tendon Reflexes: Reflexes are normal and symmetric     Psychiatric:         Mood and Affect: Mood normal          Behavior: Behavior normal        Right Hand Exam     Tenderness   Right hand tenderness location: tenderness around incision distal volar wrist     Range of Motion   The patient has normal right wrist ROM  Muscle Strength   The patient has normal right wrist strength  Tests   Tinel's sign (median nerve): negative  Finkelstein's test: negative    Other   Erythema: absent  Scars: present (well healed incision )  Sensation: normal  Pulse: present            I have personally reviewed pertinent films in PACS and my interpretation is xr right wrist demonstrates healed distal radius fracture with stable orthopedic hardware        Scribe Attestation    I,:   am acting as a scribe while in the presence of the attending physician :       I,:   personally performed the services described in this documentation    as scribed in my presence :

## 2022-01-28 ENCOUNTER — APPOINTMENT (OUTPATIENT)
Dept: PHYSICAL THERAPY | Facility: CLINIC | Age: 62
End: 2022-01-28
Payer: MEDICARE

## 2022-01-28 ENCOUNTER — APPOINTMENT (OUTPATIENT)
Dept: OCCUPATIONAL THERAPY | Facility: CLINIC | Age: 62
End: 2022-01-28
Payer: MEDICARE

## 2022-01-31 ENCOUNTER — OFFICE VISIT (OUTPATIENT)
Dept: FAMILY MEDICINE CLINIC | Facility: HOSPITAL | Age: 62
End: 2022-01-31
Payer: MEDICARE

## 2022-01-31 VITALS
SYSTOLIC BLOOD PRESSURE: 148 MMHG | DIASTOLIC BLOOD PRESSURE: 90 MMHG | HEART RATE: 110 BPM | WEIGHT: 223 LBS | OXYGEN SATURATION: 97 % | BODY MASS INDEX: 35 KG/M2 | HEIGHT: 67 IN

## 2022-01-31 DIAGNOSIS — E55.9 VITAMIN D DEFICIENCY: ICD-10-CM

## 2022-01-31 DIAGNOSIS — M46.1 BILATERAL SACROILIITIS (HCC): ICD-10-CM

## 2022-01-31 DIAGNOSIS — M25.512 ACUTE PAIN OF LEFT SHOULDER: ICD-10-CM

## 2022-01-31 DIAGNOSIS — I10 ACCELERATED HYPERTENSION: Primary | ICD-10-CM

## 2022-01-31 DIAGNOSIS — K86.1 IDIOPATHIC CHRONIC PANCREATITIS (HCC): ICD-10-CM

## 2022-01-31 DIAGNOSIS — N18.4 CKD (CHRONIC KIDNEY DISEASE) STAGE 4, GFR 15-29 ML/MIN (HCC): ICD-10-CM

## 2022-01-31 DIAGNOSIS — K59.03 DRUG-INDUCED CONSTIPATION: ICD-10-CM

## 2022-01-31 DIAGNOSIS — F31.4 BIPOLAR 1 DISORDER, DEPRESSED, SEVERE (HCC): ICD-10-CM

## 2022-01-31 DIAGNOSIS — E78.00 HYPERCHOLESTEREMIA: Chronic | ICD-10-CM

## 2022-01-31 DIAGNOSIS — E22.1 HYPERPROLACTINEMIA (HCC): ICD-10-CM

## 2022-01-31 DIAGNOSIS — K21.9 GASTROESOPHAGEAL REFLUX DISEASE, UNSPECIFIED WHETHER ESOPHAGITIS PRESENT: ICD-10-CM

## 2022-01-31 DIAGNOSIS — Z23 NEED FOR PNEUMOCOCCAL VACCINE: ICD-10-CM

## 2022-01-31 DIAGNOSIS — J45.40 MODERATE PERSISTENT ASTHMA WITHOUT COMPLICATION: ICD-10-CM

## 2022-01-31 DIAGNOSIS — I10 PRIMARY HYPERTENSION: ICD-10-CM

## 2022-01-31 DIAGNOSIS — I77.0 AVF (ARTERIOVENOUS FISTULA) (HCC): ICD-10-CM

## 2022-01-31 PROBLEM — Z98.890 PONV (POSTOPERATIVE NAUSEA AND VOMITING): Status: RESOLVED | Noted: 2021-03-22 | Resolved: 2022-01-31

## 2022-01-31 PROBLEM — R11.2 PONV (POSTOPERATIVE NAUSEA AND VOMITING): Status: RESOLVED | Noted: 2021-03-22 | Resolved: 2022-01-31

## 2022-01-31 PROCEDURE — 90732 PPSV23 VACC 2 YRS+ SUBQ/IM: CPT

## 2022-01-31 PROCEDURE — G0009 ADMIN PNEUMOCOCCAL VACCINE: HCPCS

## 2022-01-31 PROCEDURE — 99215 OFFICE O/P EST HI 40 MIN: CPT | Performed by: INTERNAL MEDICINE

## 2022-01-31 RX ORDER — MELATONIN
5000 DAILY
Qty: 90 TABLET | Refills: 5 | Status: SHIPPED | OUTPATIENT
Start: 2022-01-31

## 2022-01-31 NOTE — ASSESSMENT & PLAN NOTE
Left shoulder feels like it "pops" - now doing PT- will have her see Dr Paige Leos if not improving withPT

## 2022-01-31 NOTE — ASSESSMENT & PLAN NOTE
Switched from lipitor atorvastatin to crestor 20- improving readings in dec labs but stillhiger ldl than I would like to see- trying to avoid fried foods etc

## 2022-01-31 NOTE — PROGRESS NOTES
Assessment/Plan:             Problem List Items Addressed This Visit        Digestive    Idiopathic chronic pancreatitis ([facility] )     Has gerd symptoms but no recent pancreatitis symptoms  Drug-induced constipation     Improving- to see gi next month         GERD (gastroesophageal reflux disease)       Endocrine    Hyperprolactinemia (HCC)       Respiratory    Moderate persistent asthma without complication     Yesterday was coughing with cold weather  Using symbicort and prn ventolin- discussed using            Cardiovascular and Mediastinum    Primary hypertension     Had accelerated htn with er visit 12/20/21- then seen in early in Jan by me and carvdiolol increased to 12 5 bid   Then was continued with amilioride ordered by Dr Geovani Stewart   now improving         AVF (arteriovenous fistula) ([facility] )     Has shunt in place- has good thrill- she hears it at nighttime         Accelerated hypertension - Primary    Relevant Orders    Echo complete w/ contrast if indicated       Musculoskeletal and Integument    Bilateral sacroiliitis ([facility] )     Doing pt for balance and shoulder and arm issues            Genitourinary    CKD (chronic kidney disease) stage 4, GFR 15-29 ml/min ([facility])       Other    Hypercholesteremia (Chronic)     Switched from lipitor atorvastatin to crestor 20- improving readings in dec labs but stillhiger ldl than I would like to see- trying to avoid fried foods etc           Vitamin D deficiency     On 5000 units vit d         Bipolar 1 disorder, depressed, severe ([facility])    Acute shoulder pain     Left shoulder feels like it "pops" - now doing PT- will have her see Dr Terry Garcia if not improving withPT                 Subjective:      Patient ID: Tresa Lay is a 63 y o  female    See [facility]        The following portions of the patient's history were reviewed and updated as appropriate: allergies, current medications and problem list      Review of Systems   Constitutional: Positive for fatigue  Negative for fever  Musculoskeletal: Positive for arthralgias and back pain  Right hand/ wrist- Dr Petra Harris- has ongoing pain- doing pt   All other systems reviewed and are negative          Objective:      Current Outpatient Medications:     acetaminophen (TYLENOL) 325 mg tablet, Take 650 mg by mouth every 6 (six) hours as needed for mild pain, Disp: , Rfl:     albuterol (Ventolin HFA) 90 mcg/act inhaler, Inhale 2 puffs every 6 (six) hours as needed for wheezing or shortness of breath, Disp: 8 5 g, Rfl: 3    AMILoride 5 mg tablet, Take 1 tablet (5 mg total) by mouth 2 (two) times a day, Disp: 180 tablet, Rfl: 3    aspirin 81 mg chewable tablet, Chew 1 tablet (81 mg total) daily, Disp: , Rfl:     budesonide-formoterol (SYMBICORT) 160-4 5 mcg/act inhaler, Inhale 2 puffs 2 (two) times a day Rinse mouth after use , Disp: 30 6 g, Rfl: 3    carvedilol (COREG) 12 5 mg tablet, Take 1 tablet (12 5 mg total) by mouth 2 (two) times a day with meals, Disp: 180 tablet, Rfl: 3    clonazePAM (KlonoPIN) 0 5 mg tablet, Take 1 tablet (0 5 mg total) by mouth 3 (three) times a day, Disp: 270 tablet, Rfl: 0    fluocinonide (LIDEX) 0 05 % ointment, Apply topically 2 (two) times a day, Disp: 60 g, Rfl: 1    fluvoxaMINE (LUVOX) 100 mg tablet, 2 (two) times a day 1 tab am , 2 tabs HS, Disp: , Rfl:     HYDROcodone-acetaminophen (NORCO) 5-325 mg per tablet, Take 1 tablet by mouth every 8 (eight) hours as needed (moderate-severe headache pain) for up to 10 doses Max Daily Amount: 3 tablets, Disp: 10 tablet, Rfl: 0    lamoTRIgine (LaMICtal) 200 MG tablet, Take 200 mg by mouth 2 (two) times a day , Disp: , Rfl:     linaCLOtide (Linzess) 290 MCG CAPS, Take 1 capsule by mouth daily, Disp: 90 capsule, Rfl: 0    omeprazole (PriLOSEC) 40 MG capsule, Take 1 capsule (40 mg total) by mouth daily before breakfast, Disp: 90 capsule, Rfl: 3    ondansetron (ZOFRAN) 4 mg tablet, Take 1 tablet (4 mg total) by mouth every 8 (eight) hours as needed for nausea or vomiting, Disp: 20 tablet, Rfl: 1    Polyethylene Glycol 3350 (MIRALAX PO), Take by mouth as needed , Disp: , Rfl:     QUEtiapine (SEROquel) 100 mg tablet, Take 1 tablet by mouth daily after breakfast , Disp: , Rfl:     QUEtiapine (SEROquel) 300 mg tablet, 1 at bedtime along with a 400 mg tab, Disp: , Rfl:     QUEtiapine (SEROquel) 400 MG tablet, 1 at bedtime along with a 300 mg tab , Disp: , Rfl:     rosuvastatin (CRESTOR) 20 MG tablet, Take 1 tablet (20 mg total) by mouth daily, Disp: 90 tablet, Rfl: 1    Blood pressure 148/90, pulse (!) 110, height 5' 7" (1 702 m), weight 101 kg (223 lb), SpO2 97 %, not currently breastfeeding  Physical Exam  Vitals and nursing note reviewed  HENT:      Head: Normocephalic  Right Ear: There is no impacted cerumen  Left Ear: There is no impacted cerumen  Ears:      Comments: Left excess cerumen     Nose: Congestion present  Eyes:      General: No scleral icterus  Right eye: No discharge  Left eye: No discharge  Cardiovascular:      Rate and Rhythm: Normal rate and regular rhythm  Heart sounds: No murmur heard  No gallop  Pulmonary:      Breath sounds: No wheezing or rhonchi  Abdominal:      General: Abdomen is flat  Palpations: Abdomen is soft  Tenderness: There is no abdominal tenderness  There is no guarding  Musculoskeletal:         General: No swelling or tenderness  Skin:     Findings: No rash  Neurological:      General: No focal deficit present  Mental Status: She is alert and oriented to person, place, and time  Psychiatric:         Mood and Affect: Mood normal          Thought Content:  Thought content normal          Judgment: Judgment normal

## 2022-01-31 NOTE — ASSESSMENT & PLAN NOTE
Had accelerated htn with er visit 12/20/21- then seen in early in Jan by me and carvdiolol increased to 12 5 bid    Then was continued with amilioride ordered by Dr Fay Enriquez   now improving

## 2022-02-01 ENCOUNTER — APPOINTMENT (OUTPATIENT)
Dept: OCCUPATIONAL THERAPY | Facility: CLINIC | Age: 62
End: 2022-02-01
Payer: MEDICARE

## 2022-02-01 ENCOUNTER — APPOINTMENT (OUTPATIENT)
Dept: PHYSICAL THERAPY | Facility: CLINIC | Age: 62
End: 2022-02-01
Payer: MEDICARE

## 2022-02-02 ENCOUNTER — TELEPHONE (OUTPATIENT)
Dept: FAMILY MEDICINE CLINIC | Facility: HOSPITAL | Age: 62
End: 2022-02-02

## 2022-02-02 NOTE — TELEPHONE ENCOUNTER
I called patient to try to schedule appointment since this was not discussed at office visit  She did not want to schedule  She said she will give it a few more days, will call back to schedule if it does not resolve  Patient advised to go to ER if pain worsens

## 2022-02-02 NOTE — TELEPHONE ENCOUNTER
Patient reporting she is having pretty bad abdominal pain w/ vomitting  She didn't discuss the pain w/ dr gallardo 1/31 because she thought it was just gas pain

## 2022-02-03 ENCOUNTER — APPOINTMENT (OUTPATIENT)
Dept: PHYSICAL THERAPY | Facility: CLINIC | Age: 62
End: 2022-02-03
Payer: MEDICARE

## 2022-02-03 ENCOUNTER — APPOINTMENT (OUTPATIENT)
Dept: OCCUPATIONAL THERAPY | Facility: CLINIC | Age: 62
End: 2022-02-03
Payer: MEDICARE

## 2022-02-04 ENCOUNTER — HOSPITAL ENCOUNTER (OUTPATIENT)
Dept: NON INVASIVE DIAGNOSTICS | Age: 62
Discharge: HOME | End: 2022-02-04
Payer: MEDICARE

## 2022-02-04 VITALS
DIASTOLIC BLOOD PRESSURE: 82 MMHG | SYSTOLIC BLOOD PRESSURE: 178 MMHG | HEIGHT: 67 IN | WEIGHT: 223 LBS | HEART RATE: 88 BPM | BODY MASS INDEX: 35 KG/M2

## 2022-02-04 DIAGNOSIS — I10 ACCELERATED HYPERTENSION: ICD-10-CM

## 2022-02-04 LAB
AORTIC ROOT: 3 CM
AORTIC VALVE MEAN VELOCITY: 15.8 M/S
APICAL FOUR CHAMBER EJECTION FRACTION: 57 %
ASCENDING AORTA: 3 CM (ref 2.14–3.21)
AV AREA BY CONTINUOUS VTI: 1.9 CM2
AV AREA PEAK VELOCITY: 1.9 CM2
AV LVOT MEAN GRADIENT: 4 MMHG
AV LVOT PEAK GRADIENT: 8 MMHG
AV MEAN GRADIENT: 11 MMHG
AV PEAK GRADIENT: 22 MMHG
AV VALVE AREA: 1.94 CM2
AV VELOCITY RATIO: 0.62
DOP CALC AO PEAK VEL: 2.34 M/S
DOP CALC AO VTI: 42.85 CM
DOP CALC LVOT AREA: 3.14 CM2
DOP CALC LVOT DIAMETER: 2 CM
DOP CALC LVOT PEAK VEL VTI: 26.47 CM
DOP CALC LVOT PEAK VEL: 1.44 M/S
DOP CALC LVOT STROKE INDEX: 37.3 ML/M2
DOP CALC LVOT STROKE VOLUME: 83.12 CM3
E WAVE DECELERATION TIME: 277 MS
FRACTIONAL SHORTENING: 44 % (ref 28–44)
INTERVENTRICULAR SEPTUM IN DIASTOLE (PARASTERNAL SHORT AXIS VIEW): 1.3 CM (ref 0.56–1.04)
LAAS-AP2: 22.8 CM2
LAAS-AP4: 20.8 CM2
LEFT ATRIUM AREA SYSTOLE SINGLE PLANE A4C: 18.2 CM2
LEFT ATRIUM SIZE: 4.1 CM
LEFT INTERNAL DIMENSION IN SYSTOLE: 1.9 CM (ref 2.1–4)
LEFT VENTRICULAR INTERNAL DIMENSION IN DIASTOLE: 3.4 CM (ref 6.46–9.62)
LEFT VENTRICULAR POSTERIOR WALL IN END DIASTOLE: 1.3 CM (ref 0.54–1.03)
LEFT VENTRICULAR STROKE VOLUME: 35 ML
MV E'TISSUE VEL-SEP: 7 CM/S
MV PEAK A VEL: 0.92 M/S
MV PEAK E VEL: 60 CM/S
MV STENOSIS PRESSURE HALF TIME: 0 MS
RIGHT ATRIUM AREA SYSTOLE A4C: 16.2 CM2
RIGHT VENTRICLE ID DIMENSION: 3.4 CM
SL CV LEFT ATRIUM LENGTH A2C: 5.6 CM
SL CV LV EF: 60
SL CV PED ECHO LEFT VENTRICLE DIASTOLIC VOLUME (MOD BIPLANE) 2D: 46 ML
SL CV PED ECHO LEFT VENTRICLE SYSTOLIC VOLUME (MOD BIPLANE) 2D: 11 ML
TRICUSPID ANNULAR PLANE SYSTOLIC EXCURSION: 1.7 CM
Z-SCORE OF ASCENDING AORTA: 1.22
Z-SCORE OF INTERVENTRICULAR SEPTUM IN END DIASTOLE: 4.04
Z-SCORE OF LEFT VENTRICULAR DIMENSION IN END SYSTOLE: -8.41
Z-SCORE OF LEFT VENTRICULAR POSTERIOR WALL IN END DIASTOLE: 4.15

## 2022-02-04 PROCEDURE — 93306 TTE W/DOPPLER COMPLETE: CPT

## 2022-02-04 PROCEDURE — 93306 TTE W/DOPPLER COMPLETE: CPT | Performed by: INTERNAL MEDICINE

## 2022-02-07 ENCOUNTER — EVALUATION (OUTPATIENT)
Dept: PHYSICAL THERAPY | Facility: CLINIC | Age: 62
End: 2022-02-07
Payer: MEDICARE

## 2022-02-07 ENCOUNTER — OFFICE VISIT (OUTPATIENT)
Dept: OCCUPATIONAL THERAPY | Facility: CLINIC | Age: 62
End: 2022-02-07
Payer: MEDICARE

## 2022-02-07 DIAGNOSIS — Z47.89 AFTERCARE FOLLOWING SURGERY OF THE MUSCULOSKELETAL SYSTEM: Primary | ICD-10-CM

## 2022-02-07 DIAGNOSIS — S52.531A CLOSED COLLES' FRACTURE OF RIGHT RADIUS, INITIAL ENCOUNTER: ICD-10-CM

## 2022-02-07 DIAGNOSIS — R26.89 BALANCE DISORDER: Primary | ICD-10-CM

## 2022-02-07 PROCEDURE — 97110 THERAPEUTIC EXERCISES: CPT | Performed by: OCCUPATIONAL THERAPIST

## 2022-02-07 PROCEDURE — 97164 PT RE-EVAL EST PLAN CARE: CPT

## 2022-02-07 PROCEDURE — 97140 MANUAL THERAPY 1/> REGIONS: CPT | Performed by: OCCUPATIONAL THERAPIST

## 2022-02-07 NOTE — PROGRESS NOTES
Daily Note     Today's date: 2022  Patient name: Gisselle Templeton  : 1960  MRN: [id number]  Referring provider: Sonia Perea DO  Dx:   Encounter Diagnosis     ICD-10-CM    1  Aftercare following surgery of the musculoskeletal system  Z47 89    2  Closed Colles' fracture of right radius, initial encounter  [id number]                   Subjective: Dr said continue   I have pain (flexors)  I  Can't open bottles       Objective: See treatment diary below      Assessment: Tolerated treatment well  Patient has good ROm and strength   She has pain  in her flexors   Plan: Continue per plan of care         Precautions: Fall risk, RUE wrist fx, LUE fistula  Re-eval Date: 2022          Manuals 12/22 12/29  1/11 2/7 12/1 12/6 12/8 12/13 12/15 12/20   graston wrist  4m 4m 4m 4m 4m 4m 4m 4m 4m 4m   scar STM 2m 2m 2m 2m 2m 2m 2m 2m 2m 2m   retorgrade  4m 4m 4m 4m 4m 4m 4m 4m 4m 4m                HEP  5x day                                                                                                        Ther Ex             A/PROM R wrist  2m 2m 2m 2m 2m 2m 2m 2m 2m 2m   A/PROM  P/S 2m 2m 2m 2m 2m 2m 2m 2m 2m 2m   Wrist e/f  30x 30x 30x 2#  3x10 #1 3x10 1#  3x10 1#  3x10 1#  3x10 1#  3x10 2#  3x10   powerweb  Black  3x10 black  3x10 Red  3x10 blue  4x10 Blue 2x30 Blue  2x30 Blue  2x30 Blue  2x30 Black  2x30 Black  3x10   flexbar P/S Red  3x10 Red  3x10 Red  3x10 Red  3x10   Red  3x10 Red  3x10 Red  3x10 Red  3x10   Biceps  3#  x10 4#  3x10 4#  3x10    2#  3x10 2#  3x10 3#  3x10 3#  3x10   UBE   6m                         Ther Activity             Pegs grooved  40x 40x 40x 40x 1 set 1 set  1 set 1 set     Pinch pins  B;ack  3x10   Black  3x10 blue  3x10 Blue 3x10 Blue  6x10 Blue  6x10 Blue  6x10 Blue  6x10 Black  6x10   fxnl grasp   2 2#  3x10     2 2# ball  1x10  2 2#  1x10 2 2#  1x10                             Modalities             MH  8m 8m 8m 8m 8m 8m 8m 8m 8m 8m   CP  5m 5m 5m    5m 5m 5m 5m 5m

## 2022-02-07 NOTE — PROGRESS NOTES
PT Re-Evaluation /unexpected discharge    Today's date: 2022  Patient name: Jacque Rico  : 1960  MRN: [id number]  Referring provider: Rasheeda Hand DO  Dx:   Encounter Diagnosis     ICD-10-CM    1  Balance disorder  R26 89        Start Time: 46  Stop Time: 1430  Total time in clinic (min): 44 minutes    Assessment  Assessment details: Pt called and cancelled subsequent appointments and does not want to continue physical therapy  Pt discharged from skilled PT  Patient is a 63y o  year old female presenting to OPPT s/p diagnosis of balance disorder  Patient has not been able to attend therapy as consistently over the past month secondary to other health issues  She recently had an ECHO to assess cardiac function  Ayleen's outcome measures regarding endurance have slightly improved or stayed the same since her last evaluation since not being able to attend therapy as frequently  Her FGA demonstrates a slight decrease in her balance since her last evaluation, however, not as bad as her initial evaluation  Saundra Jamison would benefit from another month of therapy to further improve her balance with more consistency and reinforce HEP and walking program  Current BP post session 131/87  Impairments: abnormal coordination, abnormal gait, activity intolerance, impaired balance, impaired physical strength, lacks appropriate home exercise program, pain with function and safety issue  Barriers to therapy: BL knee pain    Goals  In 4 weeks, patient will:  1  Demonstrate ability to perform 30 minutes of activity without requiring seated rest break  - met  2  Improve 5xSTS by MCID value of 2 3 seconds to demonstrate improved LE stability - nearly met  3  Demonstrate increased strength of bilateral lower extremities by at least 1/2 grade to allow for improved transfer quality and stability - met    In 4-10 weeks, patient will:  1    Improve FGA score by at least 6 seconds to meet cutoff and reduce risk of falling  - not met  2  Demonstrate consistent carryover with HEP - met  3  Improve 6MWT by at least 150 ft to demonstrate improved endurance and faster gait speed  - met  4  Improve gait speed by at least 0 11 m/s to increase safety when crossing a crosswalk  - not met  5  Report ability to navigate home safely and perform stairs to go up to her bedroom  - met    Update as of 2/7: In 4 weeks, patient will:   1  Meet FOTO predicted score to demonstrate improvement in functional mobility  2   Demonstrate consistent carryover with HEP and walking program   3   Improve gait speed by at least 0 10m/s to demonstrate reduced fall risk and to safely cross a crosswalk  4  Improve ABC to at least above 67% to show reduced risk of falling and improved balance confidence  5   Improve FGA by at least 6 points to demonstrate MCID value and show improved balance to complete ADLs  Plan  Patient would benefit from: skilled speech therapy  Other planned modality interventions: as needed for sx modification  Planned therapy interventions: neuromuscular re-education, manual therapy, patient education, home exercise program, therapeutic exercise, therapeutic activities and gait training  Frequency: 2x week  Duration in weeks: 4  Plan of Care beginning date: 2/7/2022  Plan of Care expiration date: 3/9/2022  Treatment plan discussed with: patient        Subjective Evaluation    History of Present Illness  Mechanism of injury: 2/7: Has not been moving as much  Does not have as much knee pain at the moment  Denies falls, however, notes some near falls  Still not sleeping in her room upstairs, d/t having to take her dog out in the night  Update 12/29: Overall she feels like her balance is improving and she is more aware of her surroundings  Enjoys coming to therapy  Had her cholesterol medication changed  Knees have been bothering her more recently  Improved stair climbing      Present at PT: Pt reports multiple falls in the past few months  She feels most unsteady in the morning  She states her sister says she rushes around too much  She notes she has bad knees, usually wears knee wraps  Was at physical therapy for her knees prior to COVID  She says a long time ago she had a nerve conduction test and the physician said she had "foot drop"  She reports having a fistula in her LUE  Has had a past CVA, but reports her PCP said "it happened a long time ago"  She is not going up to her bedroom at this time d/t how many stairs she has  Pain  Current pain ratin  At best pain ratin  At worst pain ratin  Location: BL knees - "off and on"    Social Support  Steps to enter house: yes  1  Stairs in house: yes   22  Lives in: multiple-level home  Lives with: adult children (sister and her children )    Employment status: not working (disability)  Hand dominance: right    Treatments  No previous or current treatments  Patient Goals  Patient goals for therapy: increased strength, independence with ADLs/IADLs and improved balance  Patient goal: "be able to go upstairs" "not fall so much"        Objective     Concurrent Complaints  Negative for headaches, nausea/motion sickness, tinnitus, visual change, hearing loss, memory loss, aural fullness, poor concentration and peripheral neuropathy    Strength/Myotome Testing     Left Hip   Planes of Motion   Flexion: 4  Extension: 4  Abduction: 4    Right Hip   Planes of Motion   Flexion: 4  Extension: 4  Abduction: 4    Left Knee   Extension: 4+    Right Knee   Extension: 4+    Left Ankle/Foot   Dorsiflexion: 4  Plantar flexion: 4+    Right Ankle/Foot   Dorsiflexion: 4-  Plantar flexion: 4+  Neuro Exam:     Dizziness  Positive for disequilibrium  Negative for vertigo, oscillopsia, motion sickness, light-headedness, rocking or swaying, diplopia and floating or swimming       Headaches   Patient reports headaches: No      Oculomotor exam   Oculomotor ROM: WNL  Smooth pursuits: within normal limits  Vertical saccades: normal  Horizontal saccades: normal  Convergence: normal  Cover test: abnormal (exophoria)  Head thrust: left normal and right normal    Functional outcomes   6 minute walk test: 1440ft  Gait speed: 1 10 m/s  5x sit to stand: 13 52 seconds  (seconds)  Functional outcome comment: FGA see flowsheet:   Functional outcome gait comment: Pt exhibits small step length with occasional shuffled pattern, no reduction in toe clearance on evaluation  Appears to have slight trunk lean        Flowsheet Rows      Most Recent Value   PT/OT G-Codes    Current Score 70   Projected Score 60              Precautions: Fall risk, RUE wrist fx, LUE fistula  Re-eval Date: 3/9/2022    Date 2/7       Visit Count 12       FOTO Performed        Pain In        Pain Out                Testing        Gait speed 1 10 m/s       TUG        5xSTS 13 52 sec       FGA 18       6MWT 1440ft       Neuro Re-Ed        Dynamic balance        Static balance        Obstacle Course        Developmental position                                Ther Ex        SLR x 3 in standing        HR/TR        Mini Squats        Sit to stands        Step ups        Knee ext        Vitals performed                       Ther Activity        ADL                Gait Training        Divided Attention        Head turns        Modalities

## 2022-02-09 ENCOUNTER — APPOINTMENT (OUTPATIENT)
Dept: PHYSICAL THERAPY | Facility: CLINIC | Age: 62
End: 2022-02-09
Payer: MEDICARE

## 2022-02-09 ENCOUNTER — APPOINTMENT (OUTPATIENT)
Dept: OCCUPATIONAL THERAPY | Facility: CLINIC | Age: 62
End: 2022-02-09
Payer: MEDICARE

## 2022-02-14 ENCOUNTER — OFFICE VISIT (OUTPATIENT)
Dept: CARDIOLOGY CLINIC | Facility: CLINIC | Age: 62
End: 2022-02-14
Payer: MEDICARE

## 2022-02-14 VITALS
WEIGHT: 221.4 LBS | HEART RATE: 92 BPM | SYSTOLIC BLOOD PRESSURE: 164 MMHG | BODY MASS INDEX: 34.75 KG/M2 | DIASTOLIC BLOOD PRESSURE: 76 MMHG | HEIGHT: 67 IN

## 2022-02-14 DIAGNOSIS — N25.81 SECONDARY RENAL HYPERPARATHYROIDISM (HCC): ICD-10-CM

## 2022-02-14 DIAGNOSIS — I10 ESSENTIAL HYPERTENSION: Primary | ICD-10-CM

## 2022-02-14 DIAGNOSIS — R51.9 HEADACHE: ICD-10-CM

## 2022-02-14 DIAGNOSIS — E23.2 DIABETES INSIPIDUS (HCC): ICD-10-CM

## 2022-02-14 DIAGNOSIS — N28.1 RENAL CYST: ICD-10-CM

## 2022-02-14 DIAGNOSIS — N18.30 CHRONIC KIDNEY DISEASE, STAGE 3 (HCC): ICD-10-CM

## 2022-02-14 PROCEDURE — 99214 OFFICE O/P EST MOD 30 MIN: CPT | Performed by: INTERNAL MEDICINE

## 2022-02-14 RX ORDER — CARVEDILOL 25 MG/1
25 TABLET ORAL 2 TIMES DAILY WITH MEALS
Qty: 180 TABLET | Refills: 3 | Status: SHIPPED | OUTPATIENT
Start: 2022-02-14 | End: 2022-06-06 | Stop reason: SDUPTHER

## 2022-02-14 NOTE — PROGRESS NOTES
Cardiology Follow Up    Kerwin Colvin  1960  [id number]  [address] [facility] Reina Vinson  [address] [address] [phone]    1  Essential hypertension  carvedilol (COREG) 25 mg tablet   2  Secondary renal hyperparathyroidism (HCC)  carvedilol (COREG) 25 mg tablet   3  Chronic kidney disease, stage 3 (HCC)  carvedilol (COREG) 25 mg tablet   4  Renal cyst  carvedilol (COREG) 25 mg tablet   5  Diabetes insipidus (HCC)  carvedilol (COREG) 25 mg tablet       Interval History: Followup for HTN    Huong Tang had an ER visit for uncontrolled hypertension  She had followup with dr gallardo and her coreg and amiloride was increased  She has stable dyspnea with moderate exertion  She has CKD with a cr of 2 29       Medical Problems             Problem List     Hypercholesteremia (Chronic)    Overview Signed 3/20/2018  9:51 AM by Sky Arizmendi DO     Dx age 50         Spondylolisthesis at L5-S1 level    Renal cyst    Vitamin D deficiency    Secondary renal hyperparathyroidism ([address] )    Herniated nucleus pulposus, L5-S1    Idiopathic chronic pancreatitis ([address] )    Primary osteoarthritis of right knee    Overview Signed 3/20/2018  9:45 AM by Sky Arizmendi DO     Work injury 2013 approx- seen at [facility]         Age related osteoporosis (Chronic)    Overview Addendum 1/2/2019 10:40 AM by Sky Arizmendi DO     Had left ankle fracture age 50   clavicle fracture as child- skateboard   had dexascan dec 2017         Cardiac murmur    Overview Signed 1/2/2019 10:39 AM by Sky Arizmendi DO     Echo done 6/2018- mild pulmonary valve regurg and normal lv ej fraction- grade 1 diastolic dysfunction noted         Rectal prolapse    Overview Signed 7/30/2018 10:31 AM by Sky Arizmendi DO     Surgery done July 2018- Dr Thelma Luu         Elevated LFTs    Primary hypertension    Hyperprolactinemia [facility])    Overview Signed 1/2/2019 10:36 AM by Sky Arizmendi DO     Was dx in approx in her 31's with amenorrhea- had pitiutary ?  Cyst- last mri over 10 years ago in [facility] 1 disorder, depressed, severe ([facility] )    Overview Signed 1/23/2019  9:13 AM by Sky Arizmendi DO     Follows with [facility]         Obsessive compulsive disorder    Overview Signed 1/23/2019  9:13 AM by Sky Arizmendi DO     From [facility] notes 11/2018         Panic disorder with agoraphobia    Overview Signed 1/23/2019  9:14 AM by Sky Arizmendi DO     From [facility] notes11/2018         Eating disorder    Overview Signed 1/23/2019  9:14 AM by Sky Arizmendi DO     From [facility] notes 11/2018         Hyperparathyroidism ([facility] )    Benign neoplasm of colon    Drug-induced constipation    Infarction of left basal ganglia (HCC)    Overview Signed 5/1/2019  9:54 AM by Sky Arizmendi DO     found on  Mri 4/2019         Abnormal chest CT    Hyperphosphatemia    Abnormal thyroid exam    Thyroid nodule    Moderate persistent asthma without complication    Primary osteoarthritis of left knee    Steal syndrome dialysis vascular access [facility])    AVF (arteriovenous fistula) (HCC)    Right patellofemoral syndrome    CKD (chronic kidney disease) stage 4, GFR 15-29 ml/min ([facility])    Lab Results   Component Value Date    EGFR 22 12/30/2021    EGFR 21 12/16/2021    EGFR 22 10/11/2021    CREATININE 2 29 (H) 12/30/2021    CREATININE 2 36 (H) 12/16/2021    CREATININE 2 31 (H) 10/11/2021         Bilateral sacroiliitis (HCC)    Hallux valgus, right    Acquired hallux interphalangeus of right foot    Effusion of right knee    Left knee tendonitis    Family history of cardiac disorder in father    Anxiety    Claw toe, acquired, right    Injury of plantar plate, right, initial encounter    Acquired hallux interphalangeus, right    Closed Colles' fracture of right radius    Aftercare following surgery of the musculoskeletal system    Acute shoulder pain    Accelerated hypertension    Nausea    GERD (gastroesophageal reflux disease) Past Medical History:   Diagnosis Date    Anxiety     Anxiety disorder     Bipolar 2 disorder ([facility] )     Chronic back pain     Closed fracture of distal end of right fibula with routine healing 11/4/2020    COVID-19     in Jan 2021    CVA (cerebral vascular accident) [facility])     noted on MRI in the past    Depression     GERD (gastroesophageal reflux disease)     Hypercholesteremia     Hypernatremia     Hypertension     Hypokalemia     Idiopathic chronic pancreatitis ([facility] ) 3/20/2018    Intervertebral disc disorder with radiculopathy of lumbosacral region     resolved: 12/28/2015    Kidney disease     Panic attacks     Pericardial effusion     PONV (postoperative nausea and vomiting)     Radiculitis     resolved: 12/28/2015    Secondary renal hyperparathyroidism ([place] [address] )     Vitamin D deficiency      Social History     Socioeconomic History    Marital status:      Spouse name: Not on file    Number of children: Not on file    Years of education: Not on file    Highest education level: Not on file   Occupational History    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   Tobacco Use    Smoking status: Never Smoker    Smokeless tobacco: Never Used   Vaping Use    Vaping Use: Never used   Substance and Sexual Activity    Alcohol use: Never    Drug use: Not Currently     Types: Marijuana    Sexual activity: Not Currently   Other Topics Concern    Not on file   Social History Narrative    Daily caffeine consumption 2-3 servings a day    Lives with family  No living will  Has dentures---no dental care  Primary language--English  Feels safe at home  Social Determinants of Health     Financial Resource Strain: Not on file   Food Insecurity: Not on file   Transportation Needs: No Transportation Needs    Lack of Transportation (Medical): No    Lack of Transportation (Non-Medical):  No   Physical Activity: Inactive    Days of Exercise per Week: 0 days    Minutes of Exercise per Session: 0 min   Stress: Stress Concern Present    Feeling of Stress :  To some extent   Social Connections: Not on file   Intimate Partner Violence: Not At Risk    Fear of Current or Ex-Partner: No    Emotionally Abused: No    Physically Abused: No    Sexually Abused: No   Housing Stability: Not on file      Family History   Problem Relation Age of Onset    Bipolar disorder Mother     Mental illness Mother         depression    Stroke Mother     Dementia Mother     Colon polyps Mother     Heart disease Father     Hypertension Father     Diabetes Father     Other Family         Back disorder    Diabetes Family     Heart disease Family     Hypertension Family     Stroke Family     Thyroid disease Family     Breast cancer Paternal [de-identified]         age unknown   Michelle Gupta Breast cancer Paternal [de-identified]         age unknown   Michelle Gupta Breast cancer Maternal Aunt         age unknown    Mental illness Sister     Colon polyps Sister     Mental illness Sister     Heart disease Sister     No Known Problems Sister     Breast cancer Sister 76    Other Son         pituitary tumor    Hypertension Son     Obesity Son     No Known Problems Son     No Known Problems Maternal Grandmother     No Known Problems Maternal Grandfather     No Known Problems Paternal Grandfather     Breast cancer Paternal Aunt         age unknown    Substance Abuse Neg Hx         neg fam hx    Colon cancer Neg Hx      Past Surgical History:   Procedure Laterality Date    BUNIONECTOMY      Left foot     COLON SURGERY      COLONOSCOPY  2021    DILATION AND CURETTAGE OF UTERUS      INDUCED       surgically induced    MI ANASTOMOSIS,AV,ANY SITE Left 2019    Procedure: CREATION FISTULA ARTERIOVENOUS (AV) left wrists possible left upper;  Surgeon: Tim Rincon MD;  Location:  MAIN OR;  Service: Vascular    MI CORR [address] W/[facility] W/DIST METAR OSTEOT Left 2019    Procedure: Ryan Abreu;  Surgeon: Bhavani Farias DPM;  Location: [facility] MAIN OR;  Service: [address] W/[facility] W/DIST Zak Ortiz Right 8/3/2020    Procedure: Milanie Malaga;  Surgeon: Bhavani Farias DPM;  Location: [facility] MAIN [facility];  Service: Podiatry   [facility] W/[facility] W/[address] OSTEOT Right 9/27/2021    Procedure: Wylene Lyndsayle, right tameka osteotomy and 2nd claw toe correction;  Surgeon: Tung Sandoval MD;  Location: [facility] MAIN [facility];  Service: Orthopedics    CT ERCP DX COLLECTION SPECIMEN BRUSHING/WASHING N/A 4/11/2018    Procedure: ENDOSCOPIC RETROGRADE CHOLANGIOPANCREATOGRAPHY (ERCP);   Surgeon: Any Rocha MD;  Location: [facility];  Service: Gastroenterology    CT LAP, SURG PROCTOPEXY N/A 7/13/2018    Procedure: ROBOTIC SIGMOID RESECTION / RECTOPEXY;  Surgeon: Harrison Anna MD;  Location: [facility] MAIN OR;  Service: Colorectal    CT OPEN TREATMENT RADIAL SHAFT FRACTURE Right 10/11/2021    Procedure: OPEN REDUCTION W/ INTERNAL FIXATION (ORIF) RADIUS (WRIST), RIGHT DISTAL;  Surgeon: Lenore Donato MD;  Location: [facility];  Service: Orthopedics    CT SIGMOIDOSCOPY FLX DX W/COLLJ SPEC BR/WA IF PFRMD N/A 7/13/2018    Procedure: Regla Sullivan;  Surgeon: Harrison Anna MD;  Location: [facility] MAIN OR;  Service: Colorectal    TUBAL LIGATION Bilateral [facility] THYROID BIOPSY  7/30/2019       Current Outpatient Medications:     albuterol (Ventolin HFA) 90 mcg/act inhaler, Inhale 2 puffs every 6 (six) hours as needed for wheezing or shortness of breath, Disp: 8 5 g, Rfl: 3    AMILoride 5 mg tablet, Take 1 tablet (5 mg total) by mouth 2 (two) times a day, Disp: 180 tablet, Rfl: 3    aspirin 81 mg chewable tablet, Chew 1 tablet (81 mg total) daily, Disp: , Rfl:     carvedilol (COREG) 25 mg tablet, Take 1 tablet (25 mg total) by mouth 2 (two) times a day with meals, Disp: 180 tablet, Rfl: 3    cholecalciferol (VITAMIN D3) 1,000 units tablet, Take 5 tablets (5,000 Units total) by mouth daily, Disp: 90 tablet, Rfl: 5    clonazePAM (KlonoPIN) 0 5 mg tablet, Take 1 tablet (0 5 mg total) by mouth 3 (three) times a day, Disp: 270 tablet, Rfl: 0    fluocinonide (LIDEX) 0 05 % ointment, Apply topically 2 (two) times a day, Disp: 60 g, Rfl: 1    fluvoxaMINE (LUVOX) 100 mg tablet, 2 (two) times a day 1 tab am , 2 tabs HS, Disp: , Rfl:     HYDROcodone-acetaminophen (NORCO) 5-325 mg per tablet, Take 1 tablet by mouth every 8 (eight) hours as needed (moderate-severe headache pain) for up to 10 doses Max Daily Amount: 3 tablets, Disp: 10 tablet, Rfl: 0    lamoTRIgine (LaMICtal) 200 MG tablet, Take 200 mg by mouth 2 (two) times a day , Disp: , Rfl:     linaCLOtide (Linzess) 290 MCG CAPS, Take 1 capsule by mouth daily, Disp: 90 capsule, Rfl: 0    omeprazole (PriLOSEC) 40 MG capsule, Take 1 capsule (40 mg total) by mouth daily before breakfast, Disp: 90 capsule, Rfl: 3    ondansetron (ZOFRAN) 4 mg tablet, Take 1 tablet (4 mg total) by mouth every 8 (eight) hours as needed for nausea or vomiting, Disp: 20 tablet, Rfl: 1    Polyethylene Glycol 3350 (MIRALAX PO), Take by mouth as needed , Disp: , Rfl:     QUEtiapine (SEROquel) 100 mg tablet, Take 1 tablet by mouth daily after breakfast , Disp: , Rfl:     QUEtiapine (SEROquel) 300 mg tablet, 1 at bedtime along with a 400 mg tab, Disp: , Rfl:     QUEtiapine (SEROquel) 400 MG tablet, 1 at bedtime along with a 300 mg tab , Disp: , Rfl:     rosuvastatin (CRESTOR) 20 MG tablet, Take 1 tablet (20 mg total) by mouth daily, Disp: 90 tablet, Rfl: 1    budesonide-formoterol (SYMBICORT) 160-4 5 mcg/act inhaler, Inhale 2 puffs 2 (two) times a day Rinse mouth after use   (Patient not taking: Reported on 2/14/2022 ), Disp: 30 6 g, Rfl: 3  Allergies   Allergen Reactions    Pollen Extract Nasal Congestion       Labs:     Chemistry        Component Value Date/Time     02/27/2017 0758    K 4 6 12/30/2021 1723    K 4 7 02/27/2017 0758     12/30/2021 1723  02/27/2017 0758    CO2 27 12/30/2021 1723    CO2 28 02/27/2017 0758    BUN 23 12/30/2021 1723    BUN 36 (H) 02/27/2017 0758    CREATININE 2 29 (H) 12/30/2021 1723    CREATININE 1 86 (H) 02/27/2017 0758        Component Value Date/Time    CALCIUM 9 7 12/30/2021 1723    CALCIUM 9 6 02/27/2017 0758    CALCIUM 9 6 02/27/2017 0758    ALKPHOS 184 (H) 12/30/2021 1723    AST 22 12/30/2021 1723    ALT 30 12/30/2021 1723            No results found for: CHOL  Lab Results   Component Value Date    HDL 66 12/16/2021    HDL 49 05/26/2021    HDL 64 (H) 09/06/2019     Lab Results   Component Value Date    LDLCALC 132 (H) 12/16/2021    LDLCALC 164 (H) 05/26/2021    LDLCALC 132 (H) 09/06/2019     Lab Results   Component Value Date    TRIG 143 12/16/2021    TRIG 308 (H) 05/26/2021    TRIG 107 09/06/2019     No results found for: CHOLHDL    Imaging: XR wrist 3+ vw right    Result Date: 2/1/2022  Narrative: RIGHT WRIST INDICATION:   M25 531: Pain in right wrist  COMPARISON:  December 22, 2021 VIEWS:  XR WRIST 3+ VW RIGHT FINDINGS: Status post ORIF of the distal radial fracture with near-anatomic alignment  No evidence for hardware failure  No significant degenerative changes  No lytic or blastic osseous lesion  Soft tissues are unremarkable  Impression: Stable alignment of distal radial fracture status post ORIF  Workstation performed: [id number]     Echo complete w/ contrast if indicated    Result Date: 2/4/2022  Narrative: Mackey  Left Ventricle: Left ventricular cavity size is normal  Wall thickness is moderately increased  The left ventricular ejection fraction is 60%  Systolic function is normal  Wall motion is normal  Diastolic function is mildly abnormal, consistent with grade I (abnormal) relaxation    Left Atrium: The atrium is mildly dilated    Aortic Valve: The aortic valve is trileaflet  The leaflets are not thickened  The leaflets are moderately calcified  There is mildly reduced mobility   There is mild stenosis  The aortic valve velocity is increased due to stenosis    Mitral Valve: There is mild annular calcification    Tricuspid Valve: There is mild regurgitation  Review of Systems   Constitutional: Negative  HENT: Negative  Eyes: Negative  Cardiovascular: Negative  Respiratory: Positive for shortness of breath  Endocrine: Negative  Hematologic/Lymphatic: Negative  Skin: Negative  Musculoskeletal: Negative  Gastrointestinal: Negative  Genitourinary: Negative  Neurological: Negative  Psychiatric/Behavioral: Negative  Allergic/Immunologic: Negative  Vitals:    02/14/22 1446   BP: 164/76   Pulse: 92           Physical Exam  Vitals and nursing note reviewed  Constitutional:       Appearance: Normal appearance  HENT:      Head: Normocephalic  Nose: Nose normal       Mouth/Throat:      Mouth: Mucous membranes are moist    Eyes:      General: No scleral icterus  Conjunctiva/sclera: Conjunctivae normal    Cardiovascular:      Rate and Rhythm: Normal rate and regular rhythm  Heart sounds: Murmur heard  Systolic murmur is present  No gallop  Pulmonary:      Effort: Pulmonary effort is normal  No respiratory distress  Breath sounds: Normal breath sounds  No wheezing or rales  Abdominal:      General: Abdomen is flat  Bowel sounds are normal  There is no distension  Palpations: Abdomen is soft  Tenderness: There is no abdominal tenderness  There is no guarding  Musculoskeletal:      Cervical back: Normal range of motion and neck supple  Right lower leg: No edema  Left lower leg: No edema  Skin:     General: Skin is warm and dry  Neurological:      General: No focal deficit present  Mental Status: She is alert and oriented to person, place, and time  Psychiatric:         Mood and Affect: Mood normal          Behavior: Behavior normal          Discussion/Summary:    Hypertension: Her BP remains high   Will increase carvedilol to 25 mg twice a day  We reviewed sodium restriction  She will continue to monitor her BP  Mild Aortic Stenosis: Will continue to follow  The patient was counseled regarding diagnostic results, instructions for management, risk factor reductions, impressions  total time of encounter was 25 minutes and 15 minutes was spent counseling

## 2022-02-14 NOTE — PATIENT INSTRUCTIONS
DASH Eating Plan   WHAT YOU NEED TO KNOW:   The DASH (Dietary Approaches to Stop Hypertension) Eating Plan is designed to help prevent or lower high blood pressure  It can also help to lower LDL (bad) cholesterol and decrease your risk for heart disease  The plan is low in sodium, sugar, unhealthy fats, and total fat  It is high in potassium, calcium, magnesium, and fiber  These nutrients are added when you eat more fruits, vegetables, and whole grains  With the DASH eating plan, you need to eat a certain number of servings from each food group  This will help you get enough of certain nutrients and limit others  The amount of servings you should eat depends on how many calories you need  Your dietitian can help you create meal plans with the right number of servings for each food group  DISCHARGE INSTRUCTIONS:   What you need to know about sodium:  Your dietitian will tell you how much sodium is safe for you to have each day  People with high blood pressure should have no more than 1,500 to 2,300 mg of sodium in a day  A teaspoon (tsp) of salt has 2,300 mg of sodium  This may seem like a difficult goal, but small changes to the foods you eat can make a big difference  Your healthcare provider or dietitian can help you create a meal plan that follows your sodium limit  · Read food labels  Food labels can help you choose foods that are low in sodium  The amount of sodium is listed in milligrams (mg)  The % Daily Value (DV) column tells you how much of your daily needs are met by 1 serving of the food for each nutrient listed  Choose foods that have less than 5% of the DV of sodium  These foods are considered low in sodium  Foods that have 20% or more of the DV of sodium are considered high in sodium  Avoid foods that have more than 300 mg of sodium in each serving  Choose foods that say low-sodium, reduced-sodium, or no salt added on the food label  · Limit added salt    Do not salt food at the table if you add salt when you cook  Use herbs and spices, such as onions, garlic, and salt-free seasonings to add flavor  Try lemon or lime juice or vinegar to add a tart flavor  Use hot peppers or a small amount of hot pepper sauce to add a spicy flavor  Limit foods high in added salt, such as the following:    ? Seasonings made with salt, such as garlic salt, celery salt, onion salt, seasoned salt, meat tenderizers, and monosodium glutamate (MSG)    ? Miso soup and canned or dried soup mixes    ? Regular soy sauce, barbecue sauce, teriyaki sauce, steak sauce, Worcestershire sauce, and most flavored vinegars    ? Snack foods, such as salted chips, popcorn, pretzels, pork rinds, salted crackers, and salted nuts    ? Frozen foods, such as dinners, entrees, vegetables with sauces, and breaded meats    · Ask about salt substitutes  Ask your healthcare provider if you may use salt substitutes  Some salt substitutes have ingredients that can be harmful if you have certain health conditions  · Choose foods carefully at restaurants  Meals from restaurants, especially fast food restaurants, are often high in sodium  Some restaurants have nutrition information that tells you the amount of sodium in their foods  Ask to have your food prepared with less, or no salt  What you need to know about fats:  Healthy fats include unsaturated fats and omega-3 fatty acids  Unhealthy fats include saturated fats and trans fats  · Include healthy fats, such as the following:      ? Cooking oils, such as soybean, canola, olive, or sunflower    ? Fatty fish, such as salmon, tuna, mackerel, or sardines    ? Flaxseed oil or ground flaxseed    ? ½ cup of cooked beans, such as black beans, kidney beans, or rice beans    ? 1½ ounces of low-sodium nuts, such as almonds or walnuts    ? Low-sugar, low-sodium peanut butter    ?  Seeds such as swetha seeds or sunflower seeds       · Limit or do not have unhealthy fats, such as the following: ? Foods that contain fat from animals, such as fatty meats, whole milk, butter, and cream    ? Shortening, stick margarine, palm oil, and coconut oil    ? Full-fat or creamy salad dressing    ? Creamy soup    ? Crackers, chips, and baked goods made with margarine or shortening    ? Foods that are fried in unhealthy fats    ? Gravy and sauces, such as Alvin or cheese sauces    What you need to know about carbohydrates (carbs): All carbs break down into sugar  Complex carbs contain more fiber than simple carbs  This means complex carbs go into the bloodstream more slowly and cause less of a blood sugar spike  Try to include more complex carbs and fewer simple carbs  · Include complex carbs, such as the following:      ? 1 slice of whole-grain bread    ? 1 ounce of dry cereal that does not contain added sugar    ? ½ cup of cooked oatmeal    ? 2 ounces of cooked whole-grain pasta    ? ½ cup of cooked brown rice    · Limit or do not have simple carbs, such as the following:      ? Baked goods, such as doughnuts, pastries, and cookies    ? Mixes for cornbread and biscuits    ? White rice and pasta mixes, such as boxed macaroni and cheese    ? Instant and cold cereals that contain sugar    ? Jelly, jam, and ice cream that contain sugar    ? Condiments such as ketchup    ? Drinks high in sugar, such as soft drinks, lemonade, and fruit juice    What you need to know about vegetables and fruits:  Vegetables and fruits can be fresh, frozen, or canned  If possible, try to choose low-sodium canned options  · Include a variety of vegetables and fruits, such as the following:      ? 1 medium apple, pear, or peach (about ½ cup chopped)    ? ½ small banana    ? ½ cup berries, such as blueberries, strawberries, or blackberries    ? 1 cup of raw leafy greens, such as lettuce, spinach, kale, or lea greens    ? ½ cup of frozen or canned (no added salt) vegetables, such as green beans    ?  ½ cup of fresh, frozen, or canned fruit (canned in light syrup or fruit juice)    ? ½ cup of vegetable or fruit juice    · Limit or do not have vegetables and fruits made in the following ways:      ? Frozen fruit such as cherries that have added sugar    ? Fruit in cream or butter sauce    ? Canned vegetables that are high in sodium    ? Sauerkraut, pickled vegetables, and other foods prepared in brine    ? Fried vegetables or vegetables in butter or high-fat sauces    What you need to know about protein foods:   · Include lean or low-fat protein foods, such as the following:      ? Poultry (chicken, turkey) with no skin    ? Fish (especially fatty fish, such as salmon, fresh tuna, or mackerel)    ? Lean beef and pork (loin, round, extra lean hamburger)    ? Egg whites and egg substitutes    ? 1 cup of nonfat (skim) or 1% milk    ? 1½ ounces of fat-free or low-fat cheese    ? 6 ounces of nonfat or low-fat yogurt    · Limit or do not have high-fat protein foods, such as the following:      ? Smoked or cured meat, such as corned beef, yuan, ham, hot dogs, and sausage    ? Canned beans and canned meats or spreads, such as potted meats, sardines, anchovies, and imitation seafood    ? Deli or lunch meats, such as bologna, ham, turkey, and roast beef    ? High-fat meat (T-bone steak, regular hamburger, and ribs)    ? Whole eggs and egg yolks    ? Whole milk, 2% milk, and cream    ? Regular cheese and processed cheese    Other guidelines to follow:   · Maintain a healthy weight  Your risk for heart disease is higher if you are overweight  Your healthcare provider may suggest that you lose weight if you are overweight  You can lose weight by eating fewer calories and foods that have added sugars and fat  The DASH meal plan can help you do this  Decrease calories by eating smaller portions at each meal and fewer snacks  Ask your healthcare provider for more information about how to lose weight  · Exercise regularly    Regular exercise can help you reach or maintain a healthy weight  Regular exercise can also help decrease your blood pressure and improve your cholesterol levels  Get 30 minutes or more of moderate exercise each day of the week  To lose weight, get at least 60 minutes of exercise  Talk to your healthcare provider about the best exercise program for you  · Limit alcohol  Women should limit alcohol to 1 drink a day  Men should limit alcohol to 2 drinks a day  A drink of alcohol is 12 ounces of beer, 5 ounces of wine, or 1½ ounces of liquor  For more information:   · [facility] [address]  [address] [address]  [place] [address]  Phone: 2- 879 - [phone]  Web Address: [facility] no    © [address] [facility] 2021 Information is for End User's use only and may not be sold, redistributed or otherwise used for commercial purposes  All illustrations and images included in CareNotes® are the copyrighted property of A D A M [employer]  or [facility] Carmen Poole   The above information is an  only  It is not intended as medical advice for individual conditions or treatments  Talk to your doctor, nurse or pharmacist before following any medical regimen to see if it is safe and effective for you

## 2022-02-15 ENCOUNTER — APPOINTMENT (OUTPATIENT)
Dept: OCCUPATIONAL THERAPY | Facility: CLINIC | Age: 62
End: 2022-02-15
Payer: MEDICARE

## 2022-02-15 ENCOUNTER — APPOINTMENT (OUTPATIENT)
Dept: PHYSICAL THERAPY | Facility: CLINIC | Age: 62
End: 2022-02-15
Payer: MEDICARE

## 2022-02-15 RX ORDER — HYDROCODONE BITARTRATE AND ACETAMINOPHEN 5; 325 MG/1; MG/1
1 TABLET ORAL EVERY 8 HOURS PRN
Qty: 10 TABLET | Refills: 0 | Status: SHIPPED | OUTPATIENT
Start: 2022-02-15 | End: 2022-02-21 | Stop reason: SDUPTHER

## 2022-02-16 ENCOUNTER — TELEPHONE (OUTPATIENT)
Dept: NEPHROLOGY | Facility: CLINIC | Age: 62
End: 2022-02-16

## 2022-02-16 NOTE — TELEPHONE ENCOUNTER
Patient called and was inquiring about if starting the keto diet is safe for her to start  Please advise

## 2022-02-17 ENCOUNTER — APPOINTMENT (OUTPATIENT)
Dept: PHYSICAL THERAPY | Facility: CLINIC | Age: 62
End: 2022-02-17
Payer: MEDICARE

## 2022-02-17 ENCOUNTER — APPOINTMENT (OUTPATIENT)
Dept: OCCUPATIONAL THERAPY | Facility: CLINIC | Age: 62
End: 2022-02-17
Payer: MEDICARE

## 2022-02-17 NOTE — TELEPHONE ENCOUNTER
Called patient and informed her of the following information:  Dr Fay Enriquez would not  necessarily recommend this diet she should target 0 8-1 2 g per kg of protein daily  Patient verbally understood and stated that she wanted Dr Fay Enriquez to know that her cardiologist changed her Coreg from 12 5mg two times a day to 25mg two times a day

## 2022-02-18 ENCOUNTER — TELEPHONE (OUTPATIENT)
Dept: GASTROENTEROLOGY | Facility: CLINIC | Age: 62
End: 2022-02-18

## 2022-02-18 NOTE — TELEPHONE ENCOUNTER
I spoke with patient and advised Le[employer] has not been delivered to date  I advised obtaining the tracking number and she could follow shipping and confirm when delivered

## 2022-02-18 NOTE — TELEPHONE ENCOUNTER
Pt states she spoke w/ someone who said her Linzess was shipped/asks if it was rec'd? CB# [phone]

## 2022-02-21 ENCOUNTER — APPOINTMENT (OUTPATIENT)
Dept: PHYSICAL THERAPY | Facility: CLINIC | Age: 62
End: 2022-02-21
Payer: MEDICARE

## 2022-02-21 ENCOUNTER — APPOINTMENT (OUTPATIENT)
Dept: OCCUPATIONAL THERAPY | Facility: CLINIC | Age: 62
End: 2022-02-21
Payer: MEDICARE

## 2022-02-21 DIAGNOSIS — R51.9 HEADACHE: ICD-10-CM

## 2022-02-21 NOTE — TELEPHONE ENCOUNTER
Pt states she has a new ph#: [phone]; is awaiting pkg that Linz[employer] sent out  Phone info updated in [employer]

## 2022-02-22 ENCOUNTER — APPOINTMENT (OUTPATIENT)
Dept: PHYSICAL THERAPY | Facility: CLINIC | Age: 62
End: 2022-02-22
Payer: MEDICARE

## 2022-02-22 ENCOUNTER — APPOINTMENT (OUTPATIENT)
Dept: OCCUPATIONAL THERAPY | Facility: CLINIC | Age: 62
End: 2022-02-22
Payer: MEDICARE

## 2022-02-22 RX ORDER — HYDROCODONE BITARTRATE AND ACETAMINOPHEN 5; 325 MG/1; MG/1
1 TABLET ORAL EVERY 8 HOURS PRN
Qty: 10 TABLET | Refills: 0 | Status: SHIPPED | OUTPATIENT
Start: 2022-02-22 | End: 2022-03-01 | Stop reason: SDUPTHER

## 2022-02-24 ENCOUNTER — APPOINTMENT (OUTPATIENT)
Dept: PHYSICAL THERAPY | Facility: CLINIC | Age: 62
End: 2022-02-24
Payer: MEDICARE

## 2022-02-24 ENCOUNTER — APPOINTMENT (OUTPATIENT)
Dept: OCCUPATIONAL THERAPY | Facility: CLINIC | Age: 62
End: 2022-02-24
Payer: MEDICARE

## 2022-02-24 DIAGNOSIS — M20.5X1 CLAW TOE, ACQUIRED, RIGHT: ICD-10-CM

## 2022-02-24 DIAGNOSIS — S99.921A INJURY OF PLANTAR PLATE, RIGHT, INITIAL ENCOUNTER: ICD-10-CM

## 2022-02-24 DIAGNOSIS — M20.11 ACQUIRED HALLUX INTERPHALANGEUS, RIGHT: ICD-10-CM

## 2022-02-24 RX ORDER — ONDANSETRON 4 MG/1
4 TABLET, FILM COATED ORAL EVERY 8 HOURS PRN
Qty: 20 TABLET | Refills: 1 | Status: SHIPPED | OUTPATIENT
Start: 2022-02-24 | End: 2022-03-28 | Stop reason: SDUPTHER

## 2022-02-25 ENCOUNTER — APPOINTMENT (OUTPATIENT)
Dept: OCCUPATIONAL THERAPY | Facility: CLINIC | Age: 62
End: 2022-02-25
Payer: MEDICARE

## 2022-02-25 ENCOUNTER — APPOINTMENT (OUTPATIENT)
Dept: PHYSICAL THERAPY | Facility: CLINIC | Age: 62
End: 2022-02-25
Payer: MEDICARE

## 2022-02-28 ENCOUNTER — APPOINTMENT (OUTPATIENT)
Dept: OCCUPATIONAL THERAPY | Facility: CLINIC | Age: 62
End: 2022-02-28
Payer: MEDICARE

## 2022-02-28 ENCOUNTER — APPOINTMENT (OUTPATIENT)
Dept: PHYSICAL THERAPY | Facility: CLINIC | Age: 62
End: 2022-02-28
Payer: MEDICARE

## 2022-03-01 DIAGNOSIS — R51.9 HEADACHE: ICD-10-CM

## 2022-03-01 RX ORDER — HYDROCODONE BITARTRATE AND ACETAMINOPHEN 5; 325 MG/1; MG/1
1 TABLET ORAL EVERY 8 HOURS PRN
Qty: 10 TABLET | Refills: 0 | Status: SHIPPED | OUTPATIENT
Start: 2022-03-01 | End: 2022-03-11 | Stop reason: SDUPTHER

## 2022-03-03 ENCOUNTER — APPOINTMENT (OUTPATIENT)
Dept: OCCUPATIONAL THERAPY | Facility: CLINIC | Age: 62
End: 2022-03-03
Payer: MEDICARE

## 2022-03-04 ENCOUNTER — HOSPITAL ENCOUNTER (EMERGENCY)
Facility: HOSPITAL | Age: 62
Discharge: HOME | End: 2022-03-04
Attending: EMERGENCY MEDICINE
Payer: MEDICARE

## 2022-03-04 ENCOUNTER — APPOINTMENT (EMERGENCY)
Dept: CT IMAGING | Facility: HOSPITAL | Age: 62
End: 2022-03-04
Payer: MEDICARE

## 2022-03-04 ENCOUNTER — APPOINTMENT (EMERGENCY)
Dept: RADIOLOGY | Facility: HOSPITAL | Age: 62
End: 2022-03-04
Payer: MEDICARE

## 2022-03-04 VITALS
HEIGHT: 67 IN | BODY MASS INDEX: 34.53 KG/M2 | RESPIRATION RATE: 37 BRPM | DIASTOLIC BLOOD PRESSURE: 69 MMHG | TEMPERATURE: 97 F | SYSTOLIC BLOOD PRESSURE: 124 MMHG | WEIGHT: 220 LBS | HEART RATE: 75 BPM | OXYGEN SATURATION: 97 %

## 2022-03-04 DIAGNOSIS — S60.221A CONTUSION OF RIGHT HAND, INITIAL ENCOUNTER: ICD-10-CM

## 2022-03-04 DIAGNOSIS — S01.81XA LACERATION OF FOREHEAD, INITIAL ENCOUNTER: Primary | ICD-10-CM

## 2022-03-04 DIAGNOSIS — S20.229A BACK CONTUSION: ICD-10-CM

## 2022-03-04 DIAGNOSIS — S80.211A ABRASION OF RIGHT KNEE, INITIAL ENCOUNTER: ICD-10-CM

## 2022-03-04 DIAGNOSIS — R91.8 GROUND GLASS OPACITY PRESENT ON IMAGING OF LUNG: ICD-10-CM

## 2022-03-04 LAB
ANION GAP SERPL CALCULATED.3IONS-SCNC: 6 MMOL/L (ref 4–13)
ATRIAL RATE: 80 BPM
BASOPHILS # BLD AUTO: 0.07 THOUSANDS/ΜL (ref 0–0.1)
BASOPHILS NFR BLD AUTO: 1 % (ref 0–1)
BUN SERPL-MCNC: 40 MG/DL (ref 5–25)
CALCIUM SERPL-MCNC: 9.1 MG/DL (ref 8.3–10.1)
CHLORIDE SERPL-SCNC: 107 MMOL/L (ref 100–108)
CO2 SERPL-SCNC: 26 MMOL/L (ref 21–32)
CREAT SERPL-MCNC: 2.64 MG/DL (ref 0.6–1.3)
EOSINOPHIL # BLD AUTO: 0.22 THOUSAND/ΜL (ref 0–0.61)
EOSINOPHIL NFR BLD AUTO: 4 % (ref 0–6)
ERYTHROCYTE [DISTWIDTH] IN BLOOD BY AUTOMATED COUNT: 12.7 % (ref 11.6–15.1)
GFR SERPL CREATININE-BSD FRML MDRD: 19 ML/MIN/1.73SQ M
GLUCOSE SERPL-MCNC: 116 MG/DL (ref 65–140)
HCT VFR BLD AUTO: 36 % (ref 34.8–46.1)
HGB BLD-MCNC: 11.4 G/DL (ref 11.5–15.4)
IMM GRANULOCYTES # BLD AUTO: 0.02 THOUSAND/UL (ref 0–0.2)
IMM GRANULOCYTES NFR BLD AUTO: 0 % (ref 0–2)
LYMPHOCYTES # BLD AUTO: 1.34 THOUSANDS/ΜL (ref 0.6–4.47)
LYMPHOCYTES NFR BLD AUTO: 23 % (ref 14–44)
MCH RBC QN AUTO: 31.8 PG (ref 26.8–34.3)
MCHC RBC AUTO-ENTMCNC: 31.7 G/DL (ref 31.4–37.4)
MCV RBC AUTO: 101 FL (ref 82–98)
MONOCYTES # BLD AUTO: 0.52 THOUSAND/ΜL (ref 0.17–1.22)
MONOCYTES NFR BLD AUTO: 9 % (ref 4–12)
NEUTROPHILS # BLD AUTO: 3.65 THOUSANDS/ΜL (ref 1.85–7.62)
NEUTS SEG NFR BLD AUTO: 63 % (ref 43–75)
NRBC BLD AUTO-RTO: 0 /100 WBCS
P AXIS: 59 DEGREES
PLATELET # BLD AUTO: 227 THOUSANDS/UL (ref 149–390)
PMV BLD AUTO: 10 FL (ref 8.9–12.7)
POTASSIUM SERPL-SCNC: 4.6 MMOL/L (ref 3.5–5.3)
PR INTERVAL: 152 MS
QRS AXIS: 87 DEGREES
QRSD INTERVAL: 142 MS
QT INTERVAL: 414 MS
QTC INTERVAL: 477 MS
RBC # BLD AUTO: 3.58 MILLION/UL (ref 3.81–5.12)
SODIUM SERPL-SCNC: 139 MMOL/L (ref 136–145)
T WAVE AXIS: 53 DEGREES
VENTRICULAR RATE: 80 BPM
WBC # BLD AUTO: 5.82 THOUSAND/UL (ref 4.31–10.16)

## 2022-03-04 PROCEDURE — 70450 CT HEAD/BRAIN W/O DYE: CPT

## 2022-03-04 PROCEDURE — 71045 X-RAY EXAM CHEST 1 VIEW: CPT

## 2022-03-04 PROCEDURE — 36415 COLL VENOUS BLD VENIPUNCTURE: CPT | Performed by: EMERGENCY MEDICINE

## 2022-03-04 PROCEDURE — 99284 EMERGENCY DEPT VISIT MOD MDM: CPT

## 2022-03-04 PROCEDURE — 99285 EMERGENCY DEPT VISIT HI MDM: CPT | Performed by: EMERGENCY MEDICINE

## 2022-03-04 PROCEDURE — 12013 RPR F/E/E/N/L/M 2.6-5.0 CM: CPT | Performed by: EMERGENCY MEDICINE

## 2022-03-04 PROCEDURE — 80048 BASIC METABOLIC PNL TOTAL CA: CPT | Performed by: EMERGENCY MEDICINE

## 2022-03-04 PROCEDURE — 85025 COMPLETE CBC W/AUTO DIFF WBC: CPT | Performed by: EMERGENCY MEDICINE

## 2022-03-04 PROCEDURE — 93005 ELECTROCARDIOGRAM TRACING: CPT

## 2022-03-04 PROCEDURE — 72128 CT CHEST SPINE W/O DYE: CPT

## 2022-03-04 PROCEDURE — 73130 X-RAY EXAM OF HAND: CPT

## 2022-03-04 PROCEDURE — 72125 CT NECK SPINE W/O DYE: CPT

## 2022-03-04 PROCEDURE — 93010 ELECTROCARDIOGRAM REPORT: CPT | Performed by: INTERNAL MEDICINE

## 2022-03-04 PROCEDURE — 72131 CT LUMBAR SPINE W/O DYE: CPT

## 2022-03-04 RX ORDER — LIDOCAINE HYDROCHLORIDE AND EPINEPHRINE 10; 10 MG/ML; UG/ML
10 INJECTION, SOLUTION INFILTRATION; PERINEURAL ONCE
Status: COMPLETED | OUTPATIENT
Start: 2022-03-04 | End: 2022-03-04

## 2022-03-04 RX ORDER — BACITRACIN, NEOMYCIN, POLYMYXIN B 400; 3.5; 5 [USP'U]/G; MG/G; [USP'U]/G
1 OINTMENT TOPICAL ONCE
Status: COMPLETED | OUTPATIENT
Start: 2022-03-04 | End: 2022-03-04

## 2022-03-04 RX ORDER — ACETAMINOPHEN 325 MG/1
975 TABLET ORAL ONCE
Status: COMPLETED | OUTPATIENT
Start: 2022-03-04 | End: 2022-03-04

## 2022-03-04 RX ADMIN — BACITRACIN, NEOMYCIN, POLYMYXIN B 1 SMALL APPLICATION: 400; 3.5; 5 OINTMENT TOPICAL at 10:25

## 2022-03-04 RX ADMIN — LIDOCAINE HYDROCHLORIDE,EPINEPHRINE BITARTRATE 10 ML: 10; .01 INJECTION, SOLUTION INFILTRATION; PERINEURAL at 10:24

## 2022-03-04 RX ADMIN — ACETAMINOPHEN 975 MG: 325 TABLET ORAL at 10:42

## 2022-03-04 NOTE — ED PROVIDER NOTES
Emergency Department Trauma Note  Deni Vallecillo 63 y o  female MRN: [id number]  Unit/Bed#: ED 03/ED 03 Encounter: [id number]      Trauma Alert: Trauma Acuity: Trauma Evaluation  Model of Arrival: Mode of Arrival: Other (Comment) via Trauma Squad Name and Number: personal vehicle  Trauma Team: Current Providers  Attending Provider: Jcarlos Fischer MD  Registered Nurse: Maria Guadalupe Vela RN  Consultants:     None      History of Present Illness     Chief Complaint:   Chief Complaint   Patient presents with    Fall     patient presents to the ED with c/o head and back pain, states she tripped and fell in the middle of the night  denies thinners and denies LOC      HPI:  Deni Vallecillo is a 63 y o  female who presents with forehead lac after fall at 3 am   Mechanism:Details of Incident: pt reports getting up to go to the bathroom around 0300 and hit her head on the wall  denies loc, +neck pain, +aspirin Injury Date: 03/04/22 Injury Time: 650      63year old right handed female presents for evaluation after falling while ambulating to the bathroom last night around 3 am  Patient states she does not recall what she struck her head against, possibly the hard floor, but denies LOC  She was ambulatory at the scene and went back to bed  Patient states she has had a diffuse throbbing headache since the fall, posterior neck pain, right hand pain and an abrasion to the anterior right knee from the fall  She takes aspirin daily  No anticoagulation  Last tetanus 2019  Patient has CKD  She has a fistula in the left forearm, but has not yet required hemodialysis         History provided by:  Patient  Fall  Mechanism of injury: fall    Injury location:  Face, leg and hand  Facial injury location:  Forehead  Hand injury location:  R hand  Leg injury location:  R knee  Incident location:  Home  Time since incident:  6 hours  Fall:     Fall occurred:  Standing    Height of fall:  Ground level    Impact surface:  Unable to specify    Point of impact:  Face, hands and knees  Suspicion of alcohol use: no    Suspicion of drug use: no    Tetanus status:  Up to date  Prior to arrival data:     Patient ambulatory at scene: yes      Blood loss:  Minimal    Responsiveness at scene:  Alert    Loss of consciousness: no      Amnesic to event: no      Medications administered:  None    Immobilization:  None  Associated symptoms: back pain, headaches and neck pain    Associated symptoms: no abdominal pain, no chest pain, no nausea and no vomiting    Risk factors: no anticoagulation therapy and no dialysis    Risk factors comment:  Daily aspirin    Review of Systems   Constitutional: Negative for chills and fever  HENT: Negative for congestion  Respiratory: Negative for shortness of breath  Cardiovascular: Negative for chest pain  Gastrointestinal: Negative for abdominal pain, nausea and vomiting  Musculoskeletal: Positive for back pain and neck pain  Skin: Positive for wound  Negative for rash  Neurological: Positive for headaches  Negative for dizziness, syncope and light-headedness  All other systems reviewed and are negative        Historical Information     Immunizations:   Immunization History   Administered Date(s) Administered    COVID-19 PFIZER VACCINE 0 3 ML IM 05/05/2021, 05/29/2021    Influenza, recombinant, quadrivalent,injectable, preservative free 01/02/2019, 10/02/2019, 01/03/2022    Pneumococcal Conjugate 13-Valent 08/14/2019    Pneumococcal Polysaccharide PPV23 01/31/2022    Tdap 08/14/2019       Past Medical History:   Diagnosis Date    Anxiety     Anxiety disorder     Bipolar 2 disorder ([facility] [address] )     Chronic back pain     Closed fracture of distal end of right fibula with routine healing 11/4/2020    COVID-19     in Jan 2021    CVA ([facility]re[facility] vascular accident[facility])     noted on MRI in the past    Depression     GERD (gastroesophageal reflux disease)     Hypercholesteremia     Hypernatremia     Hypertension     Hypokalemia     Idiopathic chronic pancreatitis ([facility] [address] ) 3/20/2018    Intervertebral disc disorder with radiculopathy of lumbosacral region     resolved: 2015    Kidney disease     Panic attacks     Pericardial effusion     PONV (postoperative nausea and vomiting)     Radiculitis     resolved: 2015    Secondary renal hyperparathyroidism ([facility] [address] )     Vitamin D deficiency        Family History   Problem Relation Age of Onset    Bipolar disorder Mother     Mental illness Mother         depression    Stroke Mother     Dementia Mother     Colon polyps Mother     Heart disease Father     Hypertension Father     Diabetes Father     Other Family         Back disorder    Diabetes Family     Heart disease Family     Hypertension Family     Stroke Family     Thyroid disease Family    Karine Sherwood Breast cancer Paternal [de-identified]         age unknown   Karine Sherwood Breast cancer Paternal [de-identified]         age unknown   Karine Sherwood Breast cancer Maternal Aunt         age unknown    Mental illness Sister     Colon polyps Sister     Mental illness Sister     Heart disease Sister     No Known Problems Sister     Breast cancer Sister 76    Other Son         pituitary tumor    Hypertension Son     Obesity Son     No Known Problems Son     No Known Problems Maternal Grandmother     No Known Problems Maternal Grandfather     No Known Problems Paternal Grandfather     Breast cancer Paternal Aunt         age unknown    Substance Abuse Neg Hx         neg fam hx    Colon cancer Neg Hx      Past Surgical History:   Procedure Laterality Date    BUNIONECTOMY      Left foot     COLON SURGERY      COLONOSCOPY  2021    DILATION AND CURETTAGE OF UTERUS      INDUCED       surgically induced    AR ANASTOMOSIS,AV,ANY SITE Left 2019    Procedure: CREATION FISTULA ARTERIOVENOUS (AV) left wrists possible left upper;  Surgeon: Vaslie Fonseca MD;  Location:  MAIN OR;  Service: Vascular    [address] [place] [address] VALGUS W/SESMDC W/DIST METAR OSTEOT Left 7/1/2019    Procedure: Ryan Abreu;  Surgeon: Thomas Aguirre DPM;  Location: [facility] MAIN OR;  Service: [address] W/SESMDC W/DIST Parviz Sha Right 8/3/2020    Procedure: Melissa Ace;  Surgeon: Thomas Aguirre DPM;  Location: [facility];  Service: Podiatry    DC [place] W/SESMDC W/PROX PHLNX OSTEOT Right 9/27/2021    Procedure: Ericel Nicn, right tameka osteotomy and 2nd claw toe correction;  Surgeon: Eyad Henderson MD;  Location: [facility];  Service: Orthopedics    DC ERCP DX COLLECTION SPECIMEN BRUSHING/WASHING N/A 4/11/2018    Procedure: ENDOSCOPIC RETROGRADE CHOLANGIOPANCREATOGRAPHY (ERCP);   Surgeon: Lacey Bradford MD;  Location: [facility];  Service: Gastroenterology    DC LAP, SURG PROCTOPEXY N/A 7/13/2018    Procedure: ROBOTIC SIGMOID RESECTION / RECTOPEXY;  Surgeon: Maurice Schaffer MD;  Location[facility];  Service: Colorectal    DC OPEN TREATMENT RADIAL SHAFT FRACTURE Right 10/11/2021    Procedure: OPEN REDUCTION W/ INTERNAL FIXATION (ORIF) RADIUS (WRIST), RIGHT DISTAL;  Surgeon: Vita Renee MD;  Location[facility];  Service: Orthopedics    DC SIGMOIDOSCOPY FLX DX W/COLLJ SPEC BR/WA IF PFRMD N/A 7/13/2018    Procedure: Suzy Reis;  Surgeon: Maurice Schaffer MD;  Location: [facility];  Service: Colorectal    TUBAL LIGATION Bilateral [facility] THYROID BIOPSY  7/30/2019     Social History     Tobacco Use    Smoking status: Never Smoker    Smokeless tobacco: Never Used   Vaping Use    Vaping Use: Never used   Substance Use Topics    Alcohol use: Never    Drug use: Not Currently     Types: Marijuana     E-Cigarette/Vaping    E-Cigarette Use Never User      E-Cigarette/Vaping Substances    Nicotine No     THC No     CBD No     Flavoring No     Other No     Unknown No        Family History: non-contributory    Meds/Allergies   Prior to Admission Medications Prescriptions Last Dose Informant Patient Reported? Taking? AMILoride 5 mg tablet  Self No No   Sig: Take 1 tablet (5 mg total) by mouth 2 (two) times a day   HYDROcodone-acetaminophen (NORCO) 5-325 mg per tablet   No No   Sig: Take 1 tablet by mouth every 8 (eight) hours as needed (moderate-severe headache pain) for up to 10 doses Max Daily Amount: 3 tablets   Polyethylene Glycol 3350 (MIRALAX PO)  Self Yes No   Sig: Take by mouth as needed    QUEtiapine (SEROquel) 100 mg tablet  Self Yes No   Sig: Take 1 tablet by mouth daily after breakfast    QUEtiapine (SEROquel) 300 mg tablet  Self Yes No   Si at bedtime along with a 400 mg tab   QUEtiapine (SEROquel) 400 MG tablet  Self Yes No   Si at bedtime along with a 300 mg tab    albuterol (Ventolin HFA) 90 mcg/act inhaler  Self No No   Sig: Inhale 2 puffs every 6 (six) hours as needed for wheezing or shortness of breath   aspirin 81 mg chewable tablet  Self No No   Sig: Chew 1 tablet (81 mg total) daily   budesonide-formoterol (SYMBICORT) 160-4 5 mcg/act inhaler  Self No No   Sig: Inhale 2 puffs 2 (two) times a day Rinse mouth after use     Patient not taking: Reported on 2022    carvedilol (COREG) 25 mg tablet   No No   Sig: Take 1 tablet (25 mg total) by mouth 2 (two) times a day with meals   cholecalciferol (VITAMIN D3) 1,000 units tablet  Self No No   Sig: Take 5 tablets (5,000 Units total) by mouth daily   clonazePAM (KlonoPIN) 0 5 mg tablet  Self No No   Sig: Take 1 tablet (0 5 mg total) by mouth 3 (three) times a day   fluocinonide (LIDEX) 0 05 % ointment  Self No No   Sig: Apply topically 2 (two) times a day   fluvoxaMINE (LUVOX) 100 mg tablet  Self Yes No   Si (two) times a day 1 tab am , 2 tabs HS   lamoTRIgine (LaMICtal) 200 MG tablet  Self Yes No   Sig: Take 200 mg by mouth 2 (two) times a day    linaCLOtide (Linzess) 290 MCG CAPS  Self No No   Sig: Take 1 capsule by mouth daily   omeprazole (PriLOSEC) 40 MG capsule  Self No No   Sig: Take 1 capsule (40 mg total) by mouth daily before breakfast   ondansetron (ZOFRAN) 4 mg tablet   No No   Sig: Take 1 tablet (4 mg total) by mouth every 8 (eight) hours as needed for nausea or vomiting   rosuvastatin (CRESTOR) 20 MG tablet  Self No No   Sig: Take 1 tablet (20 mg total) by mouth daily      Facility-Administered Medications: None       Allergies   Allergen Reactions    Pollen Extract Nasal Congestion       PHYSICAL EXAM    PE limited by: none    Objective   Vitals:   First set: Temperature: (!) 97 °F (36 1 °C) (03/04/22 0850)  Pulse: 85 (03/04/22 0850)  Respirations: 18 (03/04/22 0850)  Blood Pressure: 153/79 (03/04/22 0853)  SpO2: 97 % (03/04/22 0850)    Primary Survey:   (A) Airway: intact  (B) Breathing: bilateral breath sounds  (C) Circulation: Pulses:   normal  (D) Disabliity:  GCS Total:  15  (E) Expose:  Completed    Secondary Survey: (Click on Physical Exam tab above)  Physical Exam  Vitals and nursing note reviewed  Constitutional:       General: She is not in acute distress  Appearance: She is well-developed  She is not toxic-appearing or diaphoretic  HENT:      Head: Normocephalic and atraumatic  Right Ear: External ear normal       Left Ear: External ear normal       Nose: Nose normal    Eyes:      General: No scleral icterus  Cardiovascular:      Rate and Rhythm: Normal rate and regular rhythm  Heart sounds: Normal heart sounds  Pulmonary:      Effort: Pulmonary effort is normal  No respiratory distress  Breath sounds: Normal breath sounds  Chest:      Chest wall: No tenderness or crepitus  Abdominal:      General: There is no distension  Palpations: Abdomen is soft  Tenderness: There is no abdominal tenderness  Musculoskeletal:         General: No deformity  Normal range of motion  Back:       Comments: C, T and L spine tenderness  C-collar placed  No step offs or deformities  No focal neurologic deficits     No pelvic tenderness or instability  No bony tenderness right knee  Full ROM  No instability  Abrasion present anterior right knee with no active bleeding or gross contamination  Skin:     General: Skin is warm and dry  Findings: No rash  Neurological:      General: No focal deficit present  Mental Status: She is alert  Gait: Gait normal    Psychiatric:         Mood and Affect: Mood normal          Cervical spine cleared by clinical criteria?  No (imaging required)      Invasive Devices  Report    Peripheral Intravenous Line            Peripheral IV 03/04/22 Right Hand <1 day          Line            Hemodialysis AV Fistula 11/18/19 Left  837 days                Lab Results:   Results Reviewed     Procedure Component Value Units Date/Time    Basic metabolic panel [[id number]]  (Abnormal) Collected: 03/04/22 0947    Lab Status: Final result Specimen: Blood from Arm, Right Updated: 03/04/22 1025     Sodium 139 mmol/L      Potassium 4 6 mmol/L      Chloride 107 mmol/L      CO2 26 mmol/L      ANION GAP 6 mmol/L      BUN 40 mg/dL      Creatinine 2 64 mg/dL      Glucose 116 mg/dL      Calcium 9 1 mg/dL      eGFR 19 ml/min/1 73sq m     Narrative:      [place] guidelines for Chronic Kidney Disease (CKD):     Stage 1 with normal or high GFR (GFR > 90 mL/min/1 73 square meters)    Stage 2 Mild CKD (GFR = 60-89 mL/min/1 73 square meters)    Stage 3A Moderate CKD (GFR = 45-59 mL/min/1 73 square meters)    Stage 3B Moderate CKD (GFR = 30-44 mL/min/1 73 square meters)    Stage 4 Severe CKD (GFR = 15-29 mL/min/1 73 square meters)    Stage 5 End Stage CKD (GFR <15 mL/min/1 73 square meters)  Note: GFR calculation is accurate only with a steady state creatinine    CBC and differential [[id number]]  (Abnormal) Collected: 03/04/22 0947    Lab Status: Final result Specimen: Blood from Arm, Right Updated: 03/04/22 0958     WBC 5 82 Thousand/uL      RBC 3 58 Million/uL      Hemoglobin 11 4 g/dL      Hematocrit 36 0 %       fL      MCH 31 8 pg      MCHC 31 7 g/dL      RDW 12 7 %      MPV 10 0 fL      Platelets 528 Thousands/uL      nRBC 0 /100 WBCs      Neutrophils Relative 63 %      Immat GRANS % 0 %      Lymphocytes Relative 23 %      Monocytes Relative 9 %      Eosinophils Relative 4 %      Basophils Relative 1 %      Neutrophils Absolute 3 65 Thousands/µL      Immature Grans Absolute 0 02 Thousand/uL      Lymphocytes Absolute 1 34 Thousands/µL      Monocytes Absolute 0 52 Thousand/µL      Eosinophils Absolute 0 22 Thousand/µL      Basophils Absolute 0 07 Thousands/µL                  Imaging Studies:   Direct to CT: No  TRAUMA - CT head wo contrast   Final Result by Shae Villa MD (03/04 3143)      No intracranial hemorrhage or calvarial fracture  Workstation performed: [id number]         TRAUMA - CT spine cervical wo contrast   Final Result by Shae Villa MD (03/04 1005)      1  No cervical spine fracture or traumatic malalignment  2   Hazy groundglass pulmonary opacity which could represent edema, atypical infectious process, or possibly residua of recent COVID-19 pneumonia, but is incompletely evaluated due to motion artifact  Workstation performed: [id number]         CT spine thoracic & lumbar wo contrast   Final Result by Shae Villa MD (03/04 1015)      1  Normal computed tomography of the thoracic and lumbar spine  2   Diffuse ground glass opacity throughout the visualized lungs, incompletely evaluated due to severe motion artifact but likely worsened from prior study  Findings could reflect edema, atypical infectious process, or worsening parenchymal changes    secondary to known COVID-19 pneumonia      The study was marked in EPIC for immediate notification trauma protocol        Workstation performed: [id number]         XR Trauma chest portable   ED Interpretation by Denise Valerio MD (68/57 [id number])   No acute pulmonary pathology  No displaced rib fractures      Final Result by Claudia Dee MD (03/04 1021)      Mild diffuse groundglass opacity throughout both lungs without focal consolidation  Findings may reflect mild edema, atypical infectious process, or sequela of prior COVID-19 pneumonia  Workstation performed: [id number]         XR hand 3+ views RIGHT   ED Interpretation by Francia Schmitz MD (19/26 9844)   No acute fractures or dislocations      Final Result by Claudia Dee MD (03/04 1018)      No acute osseous abnormality  Workstation performed: [id number]               Procedures  Laceration repair    Date/Time: 3/4/2022 10:07 AM  Performed by: Francia Schmitz MD  Authorized by: Francia Schmitz MD   Consent: Verbal consent obtained  Risks and benefits: risks, benefits and alternatives were discussed  Consent given by: patient  Required items: required blood products, implants, devices, and special equipment available  Patient identity confirmed: verbally with patient and arm band  Body area: head/neck  Location details: forehead  Laceration length: 3 cm  Foreign bodies: no foreign bodies  Tendon involvement: none  Nerve involvement: none  Vascular damage: no  Anesthesia: local infiltration    Anesthesia:  Local Anesthetic: lidocaine 1% with epinephrine  Anesthetic total: 6 mL      Procedure Details:  Preparation: Patient was prepped and draped in the usual sterile fashion    Irrigation solution: saline  Irrigation method: syringe  Amount of cleaning: standard  Debridement: none  Degree of undermining: none  Skin closure: 6-0 nylon  Number of sutures: 4  Technique: simple  Approximation: close  Approximation difficulty: simple  Dressing: antibiotic ointment  Patient tolerance: patient tolerated the procedure well with no immediate complications    ECG 12 Lead Documentation Only    Date/Time: 3/4/2022 9:02 AM  Performed by: Francia Schmitz MD  Authorized by: Estrella Mejia Naveed Lu MD     Indications / Diagnosis:  Fall  ECG reviewed by me, the ED Provider: yes    Patient location:  ED  Previous ECG:     Previous ECG:  Compared to current    Comparison ECG info:  1/11/21 normal ekg    Similarity:  Changes noted  Interpretation:     Interpretation: abnormal    Rate:     ECG rate:  80    ECG rate assessment: normal    Rhythm:     Rhythm: sinus rhythm    Ectopy:     Ectopy: none    QRS:     QRS axis:  Normal    QRS intervals: Wide  Conduction:     Conduction: abnormal      Abnormal conduction: complete RBBB    ST segments:     ST segments:  Normal  T waves:     T waves: normal               ED Course  ED Course as of 03/04/22 1035   Fri Mar 04, 2022   0900 Shortly after ordering CT scans for this trauma patient, I was informed that CT had to be emergently utilized by IR with the IR patient currently in the scanner  According to CT tech, estimated 25 minutes CT down time  Given that the time to transfer patient for scans would exceed this estimated time frame, will continue to assess the patient here with close monitoring of mental status  1017 Patient informed of groundglass opacities on CT  No current cough, fevers or chills  Patient to follow up with PCP   1027 Creatinine(!): 2 64  2 29 two months ago           MDM  Number of Diagnoses or Management Options  Abrasion of right knee, initial encounter: new and requires workup  Back contusion: new and requires workup  Contusion of right hand, initial encounter: new and requires workup  Ground glass opacity present on imaging of lung: new and requires workup  Laceration of forehead, initial encounter: new and requires workup  Diagnosis management comments: 63year old female presents for evaluation after fall at 3 am  Patient takes aspirin daily  Trauma evaluation initiated  CTH, C-spine, T and L spine negative for acute traumatic pathology  Ground glass opacities noted  No current fever or cough  PCP follow up  Tetanus up to date  Forehead laceration repaired at bedside  Suture removal in 5 days  Amount and/or Complexity of Data Reviewed  Clinical lab tests: ordered and reviewed  Tests in the radiology section of CPT®: ordered and reviewed  Independent visualization of images, tracings, or specimens: yes    Patient Progress  Patient progress: stable          Disposition  [facility] Transfer: No  Final diagnoses:   Laceration of forehead, initial encounter   Abrasion of right knee, initial encounter   Contusion of right hand, initial encounter   Back contusion   Ground glass opacity present on imaging of lung     Time reflects when diagnosis was documented in both MDM as applicable and the Disposition within this note     Time User Action Codes Description Comment    3/4/2022  9:13 AM Mirlande Montejobush Add [S01 81XA] Laceration of forehead, initial encounter     3/4/2022  9:18 AM TanMirlandebush Add [S80 211A] Abrasion of right knee, initial encounter     3/4/2022 10:10 AM Mirlande Maddoxbush Add [S60 00XA] Contusion of finger of right hand     3/4/2022 10:10 AM TanMirlandebush Remove [S60 00XA] Contusion of finger of right hand     3/4/2022 10:10 AM Dominik Howard Add [J41 275A] Contusion of right hand, initial encounter     3/4/2022 10:13 AM Mirlande Montejo Add [S20 229A] Back contusion     3/4/2022 10:28 AM Mirlande Maddoxbush Add [R91 8] Ground glass opacity present on imaging of lung       ED Disposition     ED Disposition Condition Date/Time Comment    Discharge Stable Fri Mar 4, 2022 10:28 AM Shai Moralez discharge to home/self care              Follow-up Information     Follow up With Specialties Details Why Contact Info Additional 4208 Stiven Farfan, DO Internal Medicine Schedule an appointment as soon as possible for a visit in 5 days For suture removal and monitoring of ground glass opacities on your imaging today [place]  [address] [phone]        [address] [facility] Emergency Department Emergency Medicine Go to  If symptoms worsen, severe headache, vomiting [address] [address] [facility] [address]  [address] [facility] Emergency Department, [address] [place] [address] Tariq [address]        Patient's Medications   Discharge Prescriptions    No medications on file     No discharge procedures on file      PDMP Review       Value Time User    PDMP Reviewed  Yes 2/22/2022  1:05 PM Morgan Holland DO          ED Provider  Electronically Signed by         Alexandru Livingston MD  03/04/22 8635       Alexandru Livingston MD  03/04/22 7304

## 2022-03-04 NOTE — DISCHARGE INSTRUCTIONS
Facial Laceration   WHAT YOU NEED TO KNOW:   A facial laceration is a tear or cut in the skin  Facial lacerations may be closed within 24 hours of injury  DISCHARGE INSTRUCTIONS:   Return to the emergency department if:   · You have a fever and the wound is painful, warm, or swollen  The wound area may be red, or fluid may come out of it  · You have heavy bleeding or bleeding that does not stop after 10 minutes of holding firm, direct pressure over the wound  Call your doctor if:   · Your wound reopens or your tape comes off  · Your wound is very painful  · Your wound is not healing, or you think there is an object in the wound  · The skin around your wound stays numb  · You have questions or concerns about your condition or care  Medicines:   · Antibiotics  may be given to prevent an infection if your wound was deep and had to be cleaned out  · Take your medicine as directed  Contact your healthcare provider if you think your medicine is not helping or if you have side effects  Tell him of her if you are allergic to any medicine  Keep a list of the medicines, vitamins, and herbs you take  Include the amounts, and when and why you take them  Bring the list or the pill bottles to follow-up visits  Carry your medicine list with you in case of an emergency  Care for your wound:  Care for your wound as directed to prevent infection and help it heal  Wash your hands with soap and warm water before and after you care for your wound  You may need to keep the wound dry for the first 24 to 48 hours  When your healthcare provider says it is okay, wash around your wound with soap and water, or as directed  Gently pat the area dry  Do not use alcohol or hydrogen peroxide to clean your wound unless you are directed to  · Do not take aspirin or NSAIDs for 24 hours after being injured  Aspirin and NSAIDs can increase blood flow  Your laceration may continue to bleed       · Do not take hot showers, eat or drink hot foods and liquids for 48 hours after being injured  Also, do not use a heating pad near your laceration  The heat can cause swelling in and around your laceration  · If your wound was covered with a bandage,  leave your bandage on as long as directed  Bandages keep your wound clean and protected  They can also prevent swelling  Ask when and how to change your bandage  Be careful not to apply the bandage or tape too tightly  This could cut off blood flow and cause more injury  · If your wound was closed with stitches,  keep your wound clean  Your healthcare provider may recommend that you apply antibiotic ointment after you clean your wound  · If your wound was closed with wound tape or medical strips,  keep the area clean and dry  The strips will usually fall off on their own after several days  · If your wound was closed with tissue glue,  do not use any ointments or lotions on the area  You may shower, but do not swim or soak in a bathtub  Gently pat the area dry after you take a shower  Do not pick at or scrub the glue area  Decrease scarring: The skin in the area of your wound may turn a different color if it is exposed to direct sunlight  After your wound is healed, use sunscreen over the area when you are out in the sun  You should do this for at least 6 months to 1 year after your injury  Some wounds scar less if they are covered while they heal   Follow up with your doctor as directed: You may need to follow up with your healthcare provider in 24 to 48 hours to have your wound checked for infection  You may need to return in 3 to 5 days if you have stitches that need to be removed  Write down your questions so you remember to ask them during your visits  © Copyright [employer] 2022 Information is for End User's use only and may not be sold, redistributed or otherwise used for commercial purposes   All illustrations and images included in CareNotes® are the copyrighted property of A D A M , Inc  or [facility] Carmen Poole   The above information is an  only  It is not intended as medical advice for individual conditions or treatments  Talk to your doctor, nurse or pharmacist before following any medical regimen to see if it is safe and effective for you

## 2022-03-07 ENCOUNTER — APPOINTMENT (OUTPATIENT)
Dept: OCCUPATIONAL THERAPY | Facility: CLINIC | Age: 62
End: 2022-03-07
Payer: MEDICARE

## 2022-03-07 ENCOUNTER — TELEPHONE (OUTPATIENT)
Dept: FAMILY MEDICINE CLINIC | Facility: HOSPITAL | Age: 62
End: 2022-03-07

## 2022-03-07 NOTE — TELEPHONE ENCOUNTER
Pt fell and was in the ER on 3/4  Was told to schedule f/u and remove stitiches with PCP in 6 days, but does not feel they are ready to come out yet  No appts with Dr Greg Palma this week, when should she schedule and can she wait until opening with Dr Greg Palma  Also asking for refill on Hydrocodone   PCB

## 2022-03-07 NOTE — TELEPHONE ENCOUNTER
Spoke with pt  The ER told her 5 days and then have the stitches remove  She feels they are not ready to come out  I advise pt that someone should take a look at them  She is also requesting a refill on hydrocodone for her shoulder pain   Please advise

## 2022-03-08 DIAGNOSIS — R51.9 HEADACHE: ICD-10-CM

## 2022-03-09 RX ORDER — HYDROCODONE BITARTRATE AND ACETAMINOPHEN 5; 325 MG/1; MG/1
1 TABLET ORAL EVERY 8 HOURS PRN
Qty: 10 TABLET | Refills: 0 | OUTPATIENT
Start: 2022-03-09

## 2022-03-10 ENCOUNTER — APPOINTMENT (OUTPATIENT)
Dept: OCCUPATIONAL THERAPY | Facility: CLINIC | Age: 62
End: 2022-03-10
Payer: MEDICARE

## 2022-03-11 ENCOUNTER — OFFICE VISIT (OUTPATIENT)
Dept: FAMILY MEDICINE CLINIC | Facility: HOSPITAL | Age: 62
End: 2022-03-11
Payer: MEDICARE

## 2022-03-11 VITALS
WEIGHT: 222 LBS | BODY MASS INDEX: 34.84 KG/M2 | DIASTOLIC BLOOD PRESSURE: 92 MMHG | HEIGHT: 67 IN | SYSTOLIC BLOOD PRESSURE: 160 MMHG | HEART RATE: 89 BPM | OXYGEN SATURATION: 95 %

## 2022-03-11 DIAGNOSIS — N18.6 END STAGE RENAL DISEASE (HCC): ICD-10-CM

## 2022-03-11 DIAGNOSIS — R26.2 AMBULATORY DYSFUNCTION: ICD-10-CM

## 2022-03-11 DIAGNOSIS — R51.9 HEADACHE: ICD-10-CM

## 2022-03-11 DIAGNOSIS — R26.89 BALANCE DISORDER: ICD-10-CM

## 2022-03-11 DIAGNOSIS — Z48.02 VISIT FOR SUTURE REMOVAL: ICD-10-CM

## 2022-03-11 DIAGNOSIS — J98.01 BRONCHOSPASM: ICD-10-CM
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
## 2022-03-11 PROCEDURE — 99214 OFFICE O/P EST MOD 30 MIN: CPT | Performed by: PHYSICIAN ASSISTANT

## 2022-03-11 RX ORDER — PREDNISONE 10 MG/1
TABLET ORAL
Qty: 20 TABLET | Refills: 2 | Status: SHIPPED | OUTPATIENT
Start: 2022-03-11 | End: 2022-03-28 | Stop reason: ALTCHOICE

## 2022-03-11 RX ORDER — HYDROCODONE BITARTRATE AND ACETAMINOPHEN 5; 325 MG/1; MG/1
1 TABLET ORAL EVERY 8 HOURS PRN
Qty: 10 TABLET | Refills: 0 | Status: SHIPPED | OUTPATIENT
Start: 2022-03-11 | End: 2022-03-25 | Stop reason: SDUPTHER

## 2022-03-11 NOTE — PROGRESS NOTES
Assessment/Plan:      Fall with injury/laceration-  Forehead area, fall 3/4/2022- area  Cleaned with alcohol swabs, 5 sutures removed, with   No bleeding or complications  Placed antibiotic cream on area,  Then area was bandaged  Patient to monitor for infection,   Can removed bandage tomorrow afternoon, do not rub or   Scratch area  Problem List Items Addressed This Visit        Genitourinary      Other Visit Diagnoses     Headache        Relevant Medications    HYDROcodone-acetaminophen (NORCO) 5-325 mg per tablet    Other Relevant Orders    Ambulatory Referral to Physical Therapy    Bronchospasm        Relevant Medications    predniSONE 10 mg tablet    Ambulatory dysfunction        Relevant Orders    Ambulatory Referral to Physical Therapy    Balance disorder        Relevant Orders    Ambulatory Referral to Physical Therapy            Subjective:      Patient ID: Ishaan Staples is a 63 y o  female  Presents to have sutures removed, has 5  Area very tender  Plans to see pulmonary  Review of Systems   Constitutional: Positive for chills  Negative for diaphoresis, fatigue and fever  Respiratory: Positive for chest tightness and shortness of breath  Negative for cough  Gastrointestinal: Negative for abdominal pain, nausea and vomiting  Objective:      /92 (BP Location: Right arm, Patient Position: Sitting, Cuff Size: Standard)   Pulse 89   Ht 5' 7" (1 702 m)   Wt 101 kg (222 lb)   LMP  (LMP Unknown)   SpO2 95%   BMI 34 77 kg/m²          Physical Exam  Vitals reviewed  Constitutional:       General: She is not in acute distress  Appearance: Normal appearance  She is not ill-appearing, toxic-appearing or diaphoretic  Pulmonary:      Effort: Pulmonary effort is normal    Musculoskeletal:         General: No swelling, tenderness, deformity or signs of injury  Normal range of motion  Right lower leg: No edema  Left lower leg: No edema     Skin: General: Skin is warm and dry  Comments: Scab noted above left eye, with 5 sutures  No sign of infection noted  Neurological:      General: No focal deficit present  Mental Status: She is alert and oriented to person, place, and time  Psychiatric:         Mood and Affect: Mood normal          Behavior: Behavior normal          Thought Content:  Thought content normal          Judgment: Judgment normal

## 2022-03-14 ENCOUNTER — TELEPHONE (OUTPATIENT)
Dept: OBGYN CLINIC | Facility: HOSPITAL | Age: 62
End: 2022-03-14

## 2022-03-14 ENCOUNTER — APPOINTMENT (OUTPATIENT)
Dept: OCCUPATIONAL THERAPY | Facility: CLINIC | Age: 62
End: 2022-03-14
Payer: MEDICARE

## 2022-03-14 DIAGNOSIS — S52.531D CLOSED COLLES' FRACTURE OF RIGHT RADIUS WITH ROUTINE HEALING, SUBSEQUENT ENCOUNTER: ICD-10-CM

## 2022-03-14 DIAGNOSIS — Z47.89 AFTERCARE FOLLOWING SURGERY OF THE MUSCULOSKELETAL SYSTEM: Primary | ICD-10-CM

## 2022-03-17 ENCOUNTER — HOSPITAL ENCOUNTER (EMERGENCY)
Facility: HOSPITAL | Age: 62
Discharge: HOME | End: 2022-03-17
Attending: EMERGENCY MEDICINE | Admitting: EMERGENCY MEDICINE
Payer: MEDICARE

## 2022-03-17 ENCOUNTER — APPOINTMENT (OUTPATIENT)
Dept: OCCUPATIONAL THERAPY | Facility: CLINIC | Age: 62
End: 2022-03-17
Payer: MEDICARE

## 2022-03-17 ENCOUNTER — APPOINTMENT (EMERGENCY)
Dept: CT IMAGING | Facility: HOSPITAL | Age: 62
End: 2022-03-17
Payer: MEDICARE

## 2022-03-17 VITALS
DIASTOLIC BLOOD PRESSURE: 77 MMHG | HEART RATE: 94 BPM | BODY MASS INDEX: 34.84 KG/M2 | HEIGHT: 67 IN | OXYGEN SATURATION: 97 % | SYSTOLIC BLOOD PRESSURE: 164 MMHG | TEMPERATURE: 97.4 F | WEIGHT: 222 LBS | RESPIRATION RATE: 20 BRPM

## 2022-03-17 DIAGNOSIS — N39.0 UTI (URINARY TRACT INFECTION): Primary | ICD-10-CM

## 2022-03-17 LAB
BACTERIA UR QL AUTO: ABNORMAL /HPF
BILIRUB UR QL STRIP: NEGATIVE
CLARITY UR: ABNORMAL
COARSE GRAN CASTS URNS QL MICRO: ABNORMAL /LPF
COLOR UR: YELLOW
EXT PREG TEST URINE: NORMAL
EXT. CONTROL ED NAV: NORMAL
GLUCOSE UR STRIP-MCNC: NEGATIVE MG/DL
HGB UR QL STRIP.AUTO: ABNORMAL
KETONES UR STRIP-MCNC: NEGATIVE MG/DL
LEUKOCYTE ESTERASE UR QL STRIP: ABNORMAL
NITRITE UR QL STRIP: NEGATIVE
NON-SQ EPI CELLS URNS QL MICRO: ABNORMAL /HPF
PH UR STRIP.AUTO: 6 [PH]
PROT UR STRIP-MCNC: ABNORMAL MG/DL
RBC #/AREA URNS AUTO: ABNORMAL /HPF
SP GR UR STRIP.AUTO: 1.01 (ref 1–1.03)
UROBILINOGEN UR QL STRIP.AUTO: 0.2 E.U./DL
WBC #/AREA URNS AUTO: ABNORMAL /HPF

## 2022-03-17 PROCEDURE — 87186 SC STD MICRODIL/AGAR DIL: CPT | Performed by: EMERGENCY MEDICINE

## 2022-03-17 PROCEDURE — 99284 EMERGENCY DEPT VISIT MOD MDM: CPT | Performed by: EMERGENCY MEDICINE

## 2022-03-17 PROCEDURE — 81025 URINE PREGNANCY TEST: CPT | Performed by: EMERGENCY MEDICINE

## 2022-03-17 PROCEDURE — 87077 CULTURE AEROBIC IDENTIFY: CPT | Performed by: EMERGENCY MEDICINE

## 2022-03-17 PROCEDURE — 81001 URINALYSIS AUTO W/SCOPE: CPT | Performed by: EMERGENCY MEDICINE

## 2022-03-17 PROCEDURE — 74176 CT ABD & PELVIS W/O CONTRAST: CPT

## 2022-03-17 PROCEDURE — 99284 EMERGENCY DEPT VISIT MOD MDM: CPT

## 2022-03-17 PROCEDURE — 87086 URINE CULTURE/COLONY COUNT: CPT | Performed by: EMERGENCY MEDICINE

## 2022-03-17 RX ORDER — CEPHALEXIN 250 MG/1
500 CAPSULE ORAL ONCE
Status: COMPLETED | OUTPATIENT
Start: 2022-03-17 | End: 2022-03-17

## 2022-03-17 RX ORDER — CEPHALEXIN 500 MG/1
500 CAPSULE ORAL EVERY 12 HOURS SCHEDULED
Qty: 14 CAPSULE | Refills: 0 | Status: SHIPPED | OUTPATIENT
Start: 2022-03-17 | End: 2022-03-24

## 2022-03-17 RX ADMIN — CEPHALEXIN 500 MG: 250 CAPSULE ORAL at 19:48

## 2022-03-17 NOTE — ED PROVIDER NOTES
History  Chief Complaint   Patient presents with    Urinary Urgency     Pt with urinary urgency since yesterday  61-year-old postmenopausal female presents for evaluation of vaginal spotting that started yesterday  Only reports small amount which is now stopped  Patient then reports she is having some urinary urgency without dysuria  Also reports right lower quadrant abdominal pain that started shortly after the urinary urgency  Patient reports a prior history of CKD  Denies nausea, vomiting  No known history of cancer per patient  Prior to Admission Medications   Prescriptions Last Dose Informant Patient Reported? Taking? AMILoride 5 mg tablet  Self No No   Sig: Take 1 tablet (5 mg total) by mouth 2 (two) times a day   HYDROcodone-acetaminophen (NORCO) 5-325 mg per tablet   No No   Sig: Take 1 tablet by mouth every 8 (eight) hours as needed (moderate-severe headache pain) for up to 10 doses Max Daily Amount: 3 tablets   Polyethylene Glycol 3350 (MIRALAX PO)  Self Yes No   Sig: Take by mouth as needed    QUEtiapine (SEROquel) 100 mg tablet  Self Yes No   Sig: Take 1 tablet by mouth daily after breakfast    QUEtiapine (SEROquel) 300 mg tablet  Self Yes No   Si at bedtime along with a 400 mg tab   QUEtiapine (SEROquel) 400 MG tablet  Self Yes No   Si at bedtime along with a 300 mg tab    albuterol (Ventolin HFA) 90 mcg/act inhaler  Self No No   Sig: Inhale 2 puffs every 6 (six) hours as needed for wheezing or shortness of breath   aspirin 81 mg chewable tablet  Self No No   Sig: Chew 1 tablet (81 mg total) daily   budesonide-formoterol (SYMBICORT) 160-4 5 mcg/act inhaler  Self No No   Sig: Inhale 2 puffs 2 (two) times a day Rinse mouth after use     Patient not taking: Reported on 2022    carvedilol (COREG) 25 mg tablet  Self No No   Sig: Take 1 tablet (25 mg total) by mouth 2 (two) times a day with meals   cholecalciferol (VITAMIN D3) 1,000 units tablet  Self No No   Sig: Take 5 tablets (5,000 Units total) by mouth daily   clonazePAM (KlonoPIN) 0 5 mg tablet  Self No No   Sig: Take 1 tablet (0 5 mg total) by mouth 3 (three) times a day   fluocinonide (LIDEX) 0 05 % ointment  Self No No   Sig: Apply topically 2 (two) times a day   fluvoxaMINE (LUVOX) 100 mg tablet  Self Yes No   Si (two) times a day 1 tab am , 2 tabs HS   lamoTRIgine (LaMICtal) 200 MG tablet  Self Yes No   Sig: Take 200 mg by mouth 2 (two) times a day    linaCLOtide (Linzess) 290 MCG CAPS  Self No No   Sig: Take 1 capsule by mouth daily   omeprazole (PriLOSEC) 40 MG capsule  Self No No   Sig: Take 1 capsule (40 mg total) by mouth daily before breakfast   ondansetron (ZOFRAN) 4 mg tablet  Self No No   Sig: Take 1 tablet (4 mg total) by mouth every 8 (eight) hours as needed for nausea or vomiting   predniSONE 10 mg tablet   No No   Sig: Take 3 tabs  For 3 days, then 2 tabs  For 3 days, then 1 tab  Daily until completed     rosuvastatin (CRESTOR) 20 MG tablet  Self No No   Sig: Take 1 tablet (20 mg total) by mouth daily      Facility-Administered Medications: None       Past Medical History:   Diagnosis Date    Anxiety     Anxiety disorder     Bipolar 2 disorder (HCC)     Chronic back pain     Closed fracture of distal end of right fibula with routine healing 2020    COVID-19     in 2021    CVA (cerebral vascular accident) [facility])     noted on MRI in the past    Depression     GERD (gastroesophageal reflux disease)     Hypercholesteremia     Hypernatremia     Hypertension     Hypokalemia     Idiopathic chronic pancreatitis ([facility] [address] ) 3/20/2018    Intervertebral disc disorder with radiculopathy of lumbosacral region     resolved: 2015    Kidney disease     Panic attacks     Pericardial effusion     PONV (postoperative nausea and vomiting)     Radiculitis     resolved: 2015    Secondary renal hyperparathyroidism ([facility] [address] )     Vitamin D deficiency        Past Surgical History:   Procedure Laterality Date    BUNIONECTOMY      Left foot     COLON SURGERY      COLONOSCOPY  2021    DILATION AND CURETTAGE OF UTERUS      INDUCED       surgically induced    ND ANASTOMOSIS,AV,ANY SITE Left 2019    Procedure: CREATION FISTULA ARTERIOVENOUS (AV) left wrists possible left upper;  Surgeon: Vasile Fonseca MD;  [facility];  Service: Vascular    ND CORRJ HALLUX VALGUS W/SESMDC W/DIST Rodríguez Si Left 2019    Procedure: Lo Francis;  Surgeon: Anish Talamantes DPM;  [facility];  Service: [address] W/SESMDC W/DIST Rodríguez Si Right 8/3/2020    Procedure: Edna Fast;  Surgeon: Anish Talamantes DPM;  [facility];  Service: Podiatry    ND [place] W/SESMDC [place] PHLNX OSTEOT Right 2021    Procedure: Judson Hernandez, right tameka osteotomy and 2nd claw toe correction;  Surgeon: Flaquito Tierney MD;  [facility];  Service: Orthopedics    ND ERCP DX COLLECTION SPECIMEN BRUSHING/WASHING N/A 2018    Procedure: ENDOSCOPIC RETROGRADE CHOLANGIOPANCREATOGRAPHY (ERCP);   Surgeon: Elliott Pablo MD;  [facility];  Service: Gastroenterology    ND LAP, SURG PROCTOPEXY N/A 2018    Procedure: ROBOTIC SIGMOID RESECTION / RECTOPEXY;  Surgeon: Henrique Maria MD;  [facility];  Service: Colorectal    ND OPEN TREATMENT RADIAL SHAFT FRACTURE Right 10/11/2021    Procedure: OPEN REDUCTION W/ INTERNAL FIXATION (ORIF) RADIUS (WRIST), RIGHT DISTAL;  Surgeon: Chetna Carr MD;  [facility];  Service: Orthopedics    ND SIGMOIDOSCOPY FLX DX W/COLLJ SPEC BR/WA IF PFRMD N/A 2018    Procedure: Destiney Perez;  Surgeon: Henrique Maria MD;  [facility];  Service: Colorectal    TUBAL LIGATION Bilateral [facility] THYROID BIOPSY  2019       Family History   Problem Relation Age of Onset    Bipolar disorder Mother     Mental illness Mother depression    Stroke Mother     Dementia Mother     Colon polyps Mother     Heart disease Father     Hypertension Father     Diabetes Father     Other Family         Back disorder    Diabetes Family     Heart disease Family     Hypertension Family     Stroke Family     Thyroid disease Family     Breast cancer Paternal [de-identified]         age unknown   [de-identified] Breast cancer Paternal [de-identified]         age unknown   [de-identified] Breast cancer Maternal Aunt         age unknown    Mental illness Sister     Colon polyps Sister     Mental illness Sister     Heart disease Sister     No Known Problems Sister     Breast cancer Sister 76    Other Son         pituitary tumor    Hypertension Son     Obesity Son     No Known Problems Son     No Known Problems Maternal Grandmother     No Known Problems Maternal Grandfather     No Known Problems Paternal Grandfather     Breast cancer Paternal Aunt         age unknown    Substance Abuse Neg Hx         neg fam hx    Colon cancer Neg Hx      I have reviewed and agree with the history as documented  E-Cigarette/Vaping    E-Cigarette Use Never User      E-Cigarette/Vaping Substances    Nicotine No     THC No     CBD No     Flavoring No     Other No     Unknown No      Social History     Tobacco Use    Smoking status: Never Smoker    Smokeless tobacco: Never Used   Vaping Use    Vaping Use: Never used   Substance Use Topics    Alcohol use: Never    Drug use: Not Currently     Types: Marijuana       Review of Systems   Constitutional: Negative for fever  Genitourinary: Positive for urgency and vaginal bleeding  All other systems reviewed and are negative  Physical Exam  Physical Exam  Vitals and nursing note reviewed  Constitutional:       Appearance: She is well-developed  HENT:      Head: Normocephalic and atraumatic        Right Ear: External ear normal       Left Ear: External ear normal       Nose: Nose normal    Eyes:      General: No scleral icterus  Cardiovascular:      Rate and Rhythm: Normal rate  Pulmonary:      Effort: Pulmonary effort is normal  No respiratory distress  Abdominal:      General: There is no distension  Tenderness: There is abdominal tenderness  Comments: Mildly tender to palpation in right lower quadrant   Musculoskeletal:         General: No deformity  Normal range of motion  Cervical back: Normal range of motion  Skin:     Findings: No rash  Neurological:      General: No focal deficit present  Mental Status: She is alert and oriented to person, place, and time  Psychiatric:         Mood and Affect: Mood normal          Vital Signs  ED Triage Vitals [03/17/22 1602]   Temperature Pulse Respirations Blood Pressure SpO2   (!) 97 4 °F (36 3 °C) 94 20 164/77 97 %      Temp Source Heart Rate Source Patient Position - Orthostatic VS BP Location FiO2 (%)   Temporal Monitor Sitting Left arm --      Pain Score       8           Vitals:    03/17/22 1602   BP: 164/77   Pulse: 94   Patient Position - Orthostatic VS: Sitting         Visual Acuity      ED Medications  Medications   cephalexin (KEFLEX) capsule 500 mg (500 mg Oral Given 3/17/22 1948)       Diagnostic Studies  Results Reviewed     Procedure Component Value Units Date/Time    Urine Microscopic [[id number]]  (Abnormal) Collected: 03/17/22 1622    Lab Status: Final result Specimen: Urine, Clean Catch Updated: 03/17/22 1847     RBC, UA 10-20 /hpf      WBC, UA Innumerable /hpf      Epithelial Cells Occasional /hpf      Bacteria, UA Occasional /hpf      COARSE GRANULAR CASTS 0-1 /lpf     Urine culture [[id number]] Collected: 03/17/22 1622    Lab Status:  In process Specimen: Urine, Clean Catch Updated: 03/17/22 1847    UA w Reflex to Microscopic w Reflex to Culture [[id number]]  (Abnormal) Collected: 03/17/22 1622    Lab Status: Final result Specimen: Urine, Clean Catch Updated: 03/17/22 1829     Color, UA Yellow     Clarity, UA Cloudy     Specific Gravity, UA 1 010     pH, UA 6 0     Leukocytes, UA Large     Nitrite, UA Negative     Protein, UA 30 (1+) mg/dl      Glucose, UA Negative mg/dl      Ketones, UA Negative mg/dl      Urobilinogen, UA 0 2 E U /dl      Bilirubin, UA Negative     Blood, UA Large    POCT pregnancy, urine [[id number]]  (Normal) Resulted: 03/17/22 1825    Lab Status: Final result Updated: 03/17/22 1825     EXT PREG TEST UR (Ref: Negative) NEG     Control VALID                 CT abdomen pelvis wo contrast   Final Result by Anamaria Gonzales MD (03/17 1913)   Small focus of gas in the cervical soft tissues may reflect recent instrumentation  Correlate clinically  Otherwise no findings to account for postmenopausal bleeding  No acute findings  Workstation performed: [id number]                    Procedures  Procedures         ED Course                               SBIRT 22yo+      Most Recent Value   SBIRT (22 yo +)    In order to provide better care to our patients, we are screening all of our patients for alcohol and drug use  Would it be okay to ask you these screening questions? No Filed at: 03/17/2022 1615                    [facility]  Number of Diagnoses or Management Options  UTI (urinary tract infection): new and requires workup  Diagnosis management comments: 82-year-old female with postmenopausal vaginal bleeding, abdominal pain  Obtain urinalysis, labs and CT abdomen pelvis  Discussed all results with patient  Abx for symptoms of UTI  Fu PCP          Amount and/or Complexity of Data Reviewed  Clinical lab tests: ordered and reviewed  Tests in the radiology section of CPT®: reviewed and ordered  Tests in the medicine section of CPT®: reviewed and ordered  Decide to obtain previous medical records or to obtain history from someone other than the patient: yes  Review and summarize past medical records: yes        Disposition  Final diagnoses:   UTI (urinary tract infection)     Time reflects when diagnosis was documented in both MDM as applicable and the Disposition within this note     Time User Action Codes Description Comment    3/17/2022  7:22 PM Filemon Rizzo Add [N39 0] UTI (urinary tract infection)       ED Disposition     ED Disposition Condition Date/Time Comment    Discharge Stable Thu Mar 17, 2022  7:22 PM Jazzy discharge to home/self care              Follow-up Information     Follow up With Specialties Details Why Contact Info Additional [address] Adolph, DO Internal Medicine In 1 week  [facility]  [address] [facility]  [id number] [place] [address] Drive [address] [id number] [facility] Emergency Department Emergency Medicine  If symptoms worsen [address] [place], [id number]  [address] [facility] Emergency Department, [address] [place], [facility], [address]          Discharge Medication List as of 3/17/2022  7:27 PM      START taking these medications    Details   cephalexin (KEFLEX) 500 mg capsule Take 1 capsule (500 mg total) by mouth every 12 (twelve) hours for 7 days, Starting Thu 3/17/2022, Until Thu 3/24/2022, Print         CONTINUE these medications which have NOT CHANGED    Details   albuterol (Ventolin HFA) 90 mcg/act inhaler Inhale 2 puffs every 6 (six) hours as needed for wheezing or shortness of breath, Starting Mon 5/3/2021, Normal      AMILoride 5 mg tablet Take 1 tablet (5 mg total) by mouth 2 (two) times a day, Starting Mon 1/3/2022, Normal      aspirin 81 mg chewable tablet Chew 1 tablet (81 mg total) daily, Starting Tue 12/28/2021, No Print      budesonide-formoterol (SYMBICORT) 160-4 5 mcg/act inhaler Inhale 2 puffs 2 (two) times a day Rinse mouth after use , Starting Mon 1/3/2022, Print      carvedilol (COREG) 25 mg tablet Take 1 tablet (25 mg total) by mouth 2 (two) times a day with meals, Starting Mon 2/14/2022, Normal      cholecalciferol (VITAMIN D3) 1,000 units tablet Take 5 tablets (5,000 Units total) by mouth daily, Starting Mon 1/31/2022, Normal      clonazePAM (KlonoPIN) 0 5 mg tablet Take 1 tablet (0 5 mg total) by mouth 3 (three) times a day, Starting Fri 1/21/2022, Normal      fluocinonide (LIDEX) 0 05 % ointment Apply topically 2 (two) times a day, Starting Sun 4/11/2021, Normal      fluvoxaMINE (LUVOX) 100 mg tablet 2 (two) times a day 1 tab am , 2 tabs HS, Starting Wed 10/10/2018, Historical Med      HYDROcodone-acetaminophen (NORCO) 5-325 mg per tablet Take 1 tablet by mouth every 8 (eight) hours as needed (moderate-severe headache pain) for up to 10 doses Max Daily Amount: 3 tablets, Starting Fri 3/11/2022, Normal      lamoTRIgine (LaMICtal) 200 MG tablet Take 200 mg by mouth 2 (two) times a day , Starting Mon 4/13/2020, Historical Med      linaCLOtide (Linzess) 290 MCG CAPS Take 1 capsule by mouth daily, Starting Fri 10/15/2021, Until Mon 2/14/2022, Print      omeprazole (PriLOSEC) 40 MG capsule Take 1 capsule (40 mg total) by mouth daily before breakfast, Starting Wed 11/24/2021, Normal      ondansetron (ZOFRAN) 4 mg tablet Take 1 tablet (4 mg total) by mouth every 8 (eight) hours as needed for nausea or vomiting, Starting Thu 2/24/2022, Normal      Polyethylene Glycol 3350 (MIRALAX PO) Take by mouth as needed , Historical Med      predniSONE 10 mg tablet Take 3 tabs  For 3 days, then 2 tabs  For 3 days, then 1 tab  Daily until completed , Normal      !! QUEtiapine (SEROquel) 100 mg tablet Take 1 tablet by mouth daily after breakfast , Historical Med      !! QUEtiapine (SEROquel) 300 mg tablet 1 at bedtime along with a 400 mg tab, Historical Med      !! QUEtiapine (SEROquel) 400 MG tablet 1 at bedtime along with a 300 mg tab , Historical Med      rosuvastatin (CRESTOR) 20 MG tablet Take 1 tablet (20 mg total) by mouth daily, Starting Mon 12/27/2021, Normal       !! - Potential duplicate medications found  Please discuss with provider  No discharge procedures on file      PDMP Review       Value Time User PDMP Reviewed  Yes 2/22/2022  1:05 PM Patti Krishnan DO          ED Provider  Electronically Signed by           Vero Wallace DO  03/17/22 2021

## 2022-03-20 LAB — BACTERIA UR CULT: ABNORMAL

## 2022-03-22 DIAGNOSIS — K21.9 GASTROESOPHAGEAL REFLUX DISEASE, UNSPECIFIED WHETHER ESOPHAGITIS PRESENT: ICD-10-CM

## 2022-03-22 RX ORDER — OMEPRAZOLE 40 MG/1
40 CAPSULE, DELAYED RELEASE ORAL
Qty: 90 CAPSULE | Refills: 3 | Status: SHIPPED | OUTPATIENT
Start: 2022-03-22 | End: 2022-06-07 | Stop reason: SDUPTHER

## 2022-03-23 ENCOUNTER — APPOINTMENT (EMERGENCY)
Dept: RADIOLOGY | Facility: HOSPITAL | Age: 62
End: 2022-03-23
Payer: MEDICARE

## 2022-03-23 ENCOUNTER — HOSPITAL ENCOUNTER (EMERGENCY)
Facility: HOSPITAL | Age: 62
Discharge: HOME | End: 2022-03-23
Attending: EMERGENCY MEDICINE
Payer: MEDICARE

## 2022-03-23 VITALS
HEART RATE: 86 BPM | TEMPERATURE: 97.3 F | SYSTOLIC BLOOD PRESSURE: 134 MMHG | RESPIRATION RATE: 16 BRPM | BODY MASS INDEX: 34.87 KG/M2 | OXYGEN SATURATION: 95 % | DIASTOLIC BLOOD PRESSURE: 74 MMHG | WEIGHT: 222.66 LBS

## 2022-03-23 DIAGNOSIS — S62.009A SCAPHOID FRACTURE: Primary | ICD-10-CM

## 2022-03-23 PROCEDURE — 99283 EMERGENCY DEPT VISIT LOW MDM: CPT

## 2022-03-23 PROCEDURE — 29125 APPL SHORT ARM SPLINT STATIC: CPT | Performed by: EMERGENCY MEDICINE

## 2022-03-23 PROCEDURE — 99284 EMERGENCY DEPT VISIT MOD MDM: CPT | Performed by: EMERGENCY MEDICINE

## 2022-03-23 PROCEDURE — 73130 X-RAY EXAM OF HAND: CPT

## 2022-03-23 PROCEDURE — 73110 X-RAY EXAM OF WRIST: CPT

## 2022-03-23 NOTE — ED PROVIDER NOTES
History  Chief Complaint   Patient presents with    Hand Injury     To ED with c/o right hand pain after falling three weeks ago  Was evaluated xray completed  "No better"     Patient is a 81-year-old female, right-hand dominant who presents with persistent right hand pain since a fall on 2022  Patient reports that she had an x-ray performed on  and that showed no acute osseous abnormality, but since then, she has been having pain in the right hand  She states that initially, it was her middle finger that was hurting her  That has resolved, but now she has pain in the wrist and thumb  She describes the pain as constant, worse with movement, feels "stiff ", radiating throughout wrist and thumb  Denies any numbness, tingling, weakness  No new fall/injury  Prior to Admission Medications   Prescriptions Last Dose Informant Patient Reported? Taking? AMILoride 5 mg tablet  Self No No   Sig: Take 1 tablet (5 mg total) by mouth 2 (two) times a day   HYDROcodone-acetaminophen (NORCO) 5-325 mg per tablet   No No   Sig: Take 1 tablet by mouth every 8 (eight) hours as needed (moderate-severe headache pain) for up to 10 doses Max Daily Amount: 3 tablets   Polyethylene Glycol 3350 (MIRALAX PO)  Self Yes No   Sig: Take by mouth as needed    QUEtiapine (SEROquel) 100 mg tablet  Self Yes No   Sig: Take 1 tablet by mouth daily after breakfast    QUEtiapine (SEROquel) 300 mg tablet  Self Yes No   Si at bedtime along with a 400 mg tab   QUEtiapine (SEROquel) 400 MG tablet  Self Yes No   Si at bedtime along with a 300 mg tab    albuterol (Ventolin HFA) 90 mcg/act inhaler  Self No No   Sig: Inhale 2 puffs every 6 (six) hours as needed for wheezing or shortness of breath   aspirin 81 mg chewable tablet  Self No No   Sig: Chew 1 tablet (81 mg total) daily   budesonide-formoterol (SYMBICORT) 160-4 5 mcg/act inhaler  Self No No   Sig: Inhale 2 puffs 2 (two) times a day Rinse mouth after use     Patient not taking: Reported on 2022    carvedilol (COREG) 25 mg tablet  Self No No   Sig: Take 1 tablet (25 mg total) by mouth 2 (two) times a day with meals   cephalexin (KEFLEX) 500 mg capsule   No No   Sig: Take 1 capsule (500 mg total) by mouth every 12 (twelve) hours for 7 days   cholecalciferol (VITAMIN D3) 1,000 units tablet  Self No No   Sig: Take 5 tablets (5,000 Units total) by mouth daily   clonazePAM (KlonoPIN) 0 5 mg tablet  Self No No   Sig: Take 1 tablet (0 5 mg total) by mouth 3 (three) times a day   fluocinonide (LIDEX) 0 05 % ointment  Self No No   Sig: Apply topically 2 (two) times a day   fluvoxaMINE (LUVOX) 100 mg tablet  Self Yes No   Si (two) times a day 1 tab am , 2 tabs HS   lamoTRIgine (LaMICtal) 200 MG tablet  Self Yes No   Sig: Take 200 mg by mouth 2 (two) times a day    linaCLOtide (Linzess) 290 MCG CAPS  Self No No   Sig: Take 1 capsule by mouth daily   omeprazole (PriLOSEC) 40 MG capsule   No No   Sig: Take 1 capsule (40 mg total) by mouth daily before breakfast   ondansetron (ZOFRAN) 4 mg tablet  Self No No   Sig: Take 1 tablet (4 mg total) by mouth every 8 (eight) hours as needed for nausea or vomiting   predniSONE 10 mg tablet   No No   Sig: Take 3 tabs  For 3 days, then 2 tabs  For 3 days, then 1 tab  Daily until completed     rosuvastatin (CRESTOR) 20 MG tablet  Self No No   Sig: Take 1 tablet (20 mg total) by mouth daily      Facility-Administered Medications: None       Past Medical History:   Diagnosis Date    Anxiety     Anxiety disorder     Bipolar 2 disorder (HCC)     Chronic back pain     Closed fracture of distal end of right fibula with routine healing 2020    COVID-19     in 2021    CVA (cerebral vascular accident) [facility])     noted on MRI in the past    Depression     GERD (gastroesophageal reflux disease)     Hypercholesteremia     Hypernatremia     Hypertension     Hypokalemia     Idiopathic chronic pancreatitis ([facility] [address] ) 3/20/2018    Intervertebral disc disorder with radiculopathy of lumbosacral region     resolved: 2015    Kidney disease     Panic attacks     Pericardial effusion     PONV (postoperative nausea and vomiting)     Radiculitis     resolved: 2015    Secondary renal hyperparathyroidism ([facility] [address] )     Vitamin D deficiency        Past Surgical History:   Procedure Laterality Date    BUNIONECTOMY      Left foot     COLON SURGERY      COLONOSCOPY  2021    DILATION AND CURETTAGE OF UTERUS      INDUCED       surgically induced    OK ANASTOMOSIS,AV,ANY SITE Left 2019    Procedure: CREATION FISTULA ARTERIOVENOUS (AV) left wrists possible left upper;  Surgeon: Brian Parks MD;  [facility];  Service: Vascular    [place] W/SESMDC W/DIST [place] Left 2019    Procedure: [place] Fish;  Surgeon: Babs Acuna DPM;  [facility];  Service: [address] [place] [address] W/SESMDC W/DIST [place] Right 8/3/2020    Procedure: Prasanna Ron;  Surgeon: Babs Acuna DPM;  [facility];  Service: Podiatry    OK [place] W/SESMDC Shelvy Jensen PHLNX OSTEOT Right 2021    Procedure: Jose M Meals, right tameka osteotomy and 2nd claw toe correction;  Surgeon: More Trujillo MD;  [facility];  Service: Orthopedics    OK ERCP DX COLLECTION SPECIMEN BRUSHING/WASHING N/A 2018    Procedure: ENDOSCOPIC RETROGRADE CHOLANGIOPANCREATOGRAPHY (ERCP);   Surgeon: Shelley Bundy MD;  [facility];  Service: Gastroenterology    OK LAP, SURG PROCTOPEXY N/A 2018    Procedure: ROBOTIC SIGMOID RESECTION / RECTOPEXY;  Surgeon: Kendrick Ahumada, MD;  [facility];  Service: Colorectal    OK OPEN TREATMENT RADIAL SHAFT FRACTURE Right 10/11/2021    Procedure: OPEN REDUCTION W/ INTERNAL FIXATION (ORIF) RADIUS (WRIST), RIGHT DISTAL;  Surgeon: Beatris Acevedo MD;  [facility];  Service: Orthopedics    OK SIGMOIDOSCOPY FLX DX W/COLLJ SPEC BR/WA IF PFRMD N/A 7/13/2018    Procedure: Chhaya Boateng;  Surgeon: Fred Ku MD;  [facility];  Service: Colorectal    TUBAL LIGATION Bilateral [facility] THYROID BIOPSY  7/30/2019       Family History   Problem Relation Age of Onset    Bipolar disorder Mother     Mental illness Mother         depression    Stroke Mother     Dementia Mother     Colon polyps Mother     Heart disease Father     Hypertension Father     Diabetes Father     Other Family         Back disorder    Diabetes Family     Heart disease Family     Hypertension Family     Stroke Family     Thyroid disease Family     Breast cancer Paternal [de-identified]         age unknown   Patito Mare Breast cancer Paternal [de-identified]         age unknown   Patito Mare Breast cancer Maternal Aunt         age unknown    Mental illness Sister     Colon polyps Sister     Mental illness Sister     Heart disease Sister     No Known Problems Sister     Breast cancer Sister 76    Other Son         pituitary tumor    Hypertension Son     Obesity Son     No Known Problems Son     No Known Problems Maternal Grandmother     No Known Problems Maternal Grandfather     No Known Problems Paternal Grandfather     Breast cancer Paternal Aunt         age unknown    Substance Abuse Neg Hx         neg fam hx    Colon cancer Neg Hx      I have reviewed and agree with the history as documented  E-Cigarette/Vaping    E-Cigarette Use Never User      E-Cigarette/Vaping Substances    Nicotine No     THC No     CBD No     Flavoring No     Other No     Unknown No      Social History     Tobacco Use    Smoking status: Never Smoker    Smokeless tobacco: Never Used   Vaping Use    Vaping Use: Never used   Substance Use Topics    Alcohol use: Never    Drug use: Not Currently     Types: Marijuana       Review of Systems   Constitutional: Negative for chills and fever     Musculoskeletal:        Right hand and wrist pain   Skin: Negative for color change and wound  Neurological: Negative for weakness and numbness  Physical Exam  Physical Exam  Vitals and nursing note reviewed  Constitutional:       General: She is not in acute distress  Appearance: Normal appearance  She is not ill-appearing, toxic-appearing or diaphoretic  HENT:      Head: Normocephalic and atraumatic  Mouth/Throat:      Mouth: Mucous membranes are moist    Eyes:      Conjunctiva/sclera: Conjunctivae normal       Pupils: Pupils are equal, round, and reactive to light  Cardiovascular:      Rate and Rhythm: Normal rate and regular rhythm  Pulses: Normal pulses  Heart sounds: Normal heart sounds  No murmur heard  Pulmonary:      Effort: Pulmonary effort is normal  No respiratory distress  Breath sounds: Normal breath sounds  No stridor  No wheezing, rhonchi or rales  Chest:      Chest wall: No tenderness  Abdominal:      General: Bowel sounds are normal  There is no distension  Palpations: Abdomen is soft  Tenderness: There is no abdominal tenderness  There is no guarding or rebound  Musculoskeletal:      Right wrist: Tenderness and snuff box tenderness present  No swelling, deformity, effusion, lacerations, bony tenderness or crepitus  Normal range of motion  Normal pulse  Right hand: Tenderness present  No swelling, deformity, lacerations or bony tenderness  Decreased range of motion  Normal strength  Normal sensation  There is no disruption of two-point discrimination  Normal capillary refill  Normal pulse  Hands:       Cervical back: Neck supple  Right lower leg: No edema  Left lower leg: No edema  Skin:     General: Skin is warm and dry  Neurological:      General: No focal deficit present  Mental Status: She is alert and oriented to person, place, and time  Mental status is at baseline     Psychiatric:         Mood and Affect: Mood normal          Behavior: Behavior normal  Vital Signs  ED Triage Vitals [03/23/22 0812]   Temperature Pulse Respirations Blood Pressure SpO2   (!) 97 3 °F (36 3 °C) 90 18 155/80 97 %      Temp Source Heart Rate Source Patient Position - Orthostatic VS BP Location FiO2 (%)   Tympanic Monitor Sitting Right arm --      Pain Score       10 - Worst Possible Pain           Vitals:    03/23/22 0812 03/23/22 0900   BP: 155/80 134/74   Pulse: 90 86   Patient Position - Orthostatic VS: Sitting Sitting         Visual Acuity      ED Medications  Medications - No data to display    Diagnostic Studies  Results Reviewed     None                 XR hand 3+ views RIGHT   ED Interpretation by Albin Mcgowan DO (03/23 2052)   Abnormal   Scaphoid fracture interpreted by me independently      XR wrist 3+ views RIGHT   ED Interpretation by Albin Mcgowan DO (03/23 9870)   Abnormal   Scaphoid fractures interpreted by me independently                 Procedures  Splint application    Date/Time: 3/23/2022 9:11 AM  Performed by: Albin Mcgowan DO  Authorized by: Albin Mcgowan DO   Universal Protocol:  Consent: Verbal consent obtained  Consent given by: patient  Patient identity confirmed: verbally with patient      Pre-procedure details:     Sensation:  Normal    Skin color:  Pink  Procedure details:     Laterality:  Right    Location:  Wrist    Wrist:  R wrist    Splint type:  Thumb spica    Supplies:  Cotton padding, Ortho-Glass and elastic bandage  Post-procedure details:     Pain:  Unchanged    Sensation:  Normal    Skin color:  Pink    Patient tolerance of procedure: Tolerated well, no immediate complications             ED Course                               SBIRT 20yo+      Most Recent Value   SBIRT (24 yo +)    In order to provide better care to our patients, we are screening all of our patients for alcohol and drug use  Would it be okay to ask you these screening questions? Yes Filed at: 03/23/2022 5142   Initial Alcohol Screen: US AUDIT-C     1   How often do you have a drink containing alcohol? 0 Filed at: 03/23/2022 0905   2  How many drinks containing alcohol do you have on a typical day you are drinking? 0 Filed at: 03/23/2022 0905   3a  Male UNDER 65: How often do you have five or more drinks on one occasion? 0 Filed at: 03/23/2022 0905   3b  FEMALE Any Age, or MALE 65+: How often do you have 4 or more drinks on one occassion? 0 Filed at: 03/23/2022 0905   Audit-C Score 0 Filed at: 03/23/2022 2136   ZAYRA: How many times in the past year have you    Used an illegal drug or used a prescription medication for non-medical reasons? Never Filed at: 03/23/2022 0027                    MDM  Number of Diagnoses or Management Options  Diagnosis management comments: Assessment and plan:  77-year-old female presenting with right wrist and hand pain that is persistent status post injury  On exam, patient has tenderness over the anatomical snuffbox  Concern for scaphoid injury  Will repeat imaging of the hand and obtain imaging of the wrist   Will apply splint and have patient follow-up with orthopedics  Disposition  Final diagnoses:   Scaphoid fracture     Time reflects when diagnosis was documented in both MDM as applicable and the Disposition within this note     Time User Action Codes Description Comment    3/23/2022  9:10 AM Pilar Villeda Scaphoid fracture       ED Disposition     ED Disposition Condition Date/Time Comment    Discharge Stable Wed Mar 23, 2022  8:35 AM Jazzy discharge to home/self care              Follow-up Information     Follow up With Specialties Details Why Contact Info Additional [address] [facility] [place] [facility] Schedule an appointment as soon as possible for a visit in 1 week for re-evaluation [id number] [facility] [address]  [address] [place] [address] [facility] [place], [address] [place] [address] [place], [facility] [address]     [address] Emergency Department Emergency Medicine Go to  As needed, If symptoms worsen, for re-evaluation [address] [place],[address] [address]  [address] [facility], [address] [place] [address], [facility], [facility] [address]    Cathy Perez DO Internal Medicine Schedule an appointment as soon as possible for a visit in 3 days for re-evaluation [facility]  [address] [address] [phone]             Patient's Medications   Discharge Prescriptions    No medications on file       No discharge procedures on file      PDMP Review       Value Time User    PDMP Reviewed  Yes 2/22/2022  1:05 PM Cathy Perez DO          ED Provider  Electronically Signed by           Ariela Moraes DO  03/23/22 2045

## 2022-03-24 ENCOUNTER — OFFICE VISIT (OUTPATIENT)
Dept: OBGYN CLINIC | Facility: CLINIC | Age: 62
End: 2022-03-24
Payer: MEDICARE

## 2022-03-24 ENCOUNTER — HOSPITAL ENCOUNTER (OUTPATIENT)
Dept: MRI IMAGING | Facility: HOSPITAL | Age: 62
Discharge: HOME | End: 2022-03-24
Payer: MEDICARE

## 2022-03-24 ENCOUNTER — OFFICE VISIT (OUTPATIENT)
Dept: OCCUPATIONAL THERAPY | Facility: CLINIC | Age: 62
End: 2022-03-24
Payer: MEDICARE

## 2022-03-24 ENCOUNTER — TELEPHONE (OUTPATIENT)
Dept: OBGYN CLINIC | Facility: HOSPITAL | Age: 62
End: 2022-03-24

## 2022-03-24 VITALS — WEIGHT: 228 LBS | HEIGHT: 67 IN | BODY MASS INDEX: 35.79 KG/M2

## 2022-03-24 DIAGNOSIS — S69.91XA INJURY OF RIGHT THUMB, INITIAL ENCOUNTER: Primary | ICD-10-CM

## 2022-03-24 DIAGNOSIS — S69.91XA INJURY OF RIGHT THUMB, INITIAL ENCOUNTER: ICD-10-CM

## 2022-03-24 PROCEDURE — G1004 CDSM NDSC: HCPCS

## 2022-03-24 PROCEDURE — 73218 MRI UPPER EXTREMITY W/O DYE: CPT

## 2022-03-24 PROCEDURE — 99213 OFFICE O/P EST LOW 20 MIN: CPT | Performed by: ORTHOPAEDIC SURGERY

## 2022-03-24 PROCEDURE — 97760 ORTHOTIC MGMT&TRAING 1ST ENC: CPT | Performed by: OCCUPATIONAL THERAPIST

## 2022-03-24 NOTE — TELEPHONE ENCOUNTER
Patient called stating that she had an OVN with Dr Sarai Rosas, 3/24/22  She stated that she thought Dr Sarai Rosas was going to request Stat MRI  Verified office note stated stat MRI  Imaging stating that they had no orders stating stat MRI  Patient did have MRI performed today at [address] MRI  Results not yet released

## 2022-03-24 NOTE — ASSESSMENT & PLAN NOTE
Findings consistent with right thumb injury, suspect rupture flexor pollicis longus  Findings and treatment options were discussed with the patient  X-rays were reviewed with her  No obvious fracture seen on x-rays  Wrist hardware is intact  I will send her for a stat MRI of the right thumb to evaluate the flexor tendon under MARS protocol  We will also have OT make a custom molded thumb spica splint  I will refer her to one of the hand surgeons to go over MRI results and to discuss further treatment recommendations  All patient's questions were answered to her satisfaction  This note is created using dictation transcription  It may contain typographical errors, grammatical errors, improperly dictated words, background noise and other errors

## 2022-03-24 NOTE — PROGRESS NOTES
Orthosis    Diagnosis:   1  Injury of right thumb, initial encounter  Ambulatory Referral to PT/OT Hand Therapy     Indication: Motion Blocking    Location: Right  wrist, hand and thumb  Supplies: Custom Fit Orthotic and Skin coverage   Orthosis type: Thumb spica with IP block  Wearing Schedule: Remove for hygiene only and Remove with Protected Technique Only as Needed  Describe Position: IP flex    Precautions: tendon ruputure    Patient or Caregiver expresses understanding of wearing Schedule and Precautions? Yes  Patient or Caregiver able to don/doff orthotic independently? Yes    Written orders provided to patient?  yes  Orders Obtained: Written  Orders Obtained from: CAROLINA Wallace     Return for evaluation and treatment not at this time

## 2022-03-24 NOTE — PROGRESS NOTES
Assessment:     1  Injury of right thumb, initial encounter        Plan:     Problem List Items Addressed This Visit        Other    Injury of right thumb - Primary     Findings consistent with right thumb injury, suspect rupture flexor pollicis longus  Findings and treatment options were discussed with the patient  X-rays were reviewed with her  No obvious fracture seen on x-rays  Wrist hardware is intact  I will send her for a stat MRI of the right thumb to evaluate the flexor tendon under MARS protocol  We will also have OT make a custom molded thumb spica splint  I will refer her to one of the hand surgeons to go over MRI results and to discuss further treatment recommendations  All patient's questions were answered to her satisfaction  This note is created using dictation transcription  It may contain typographical errors, grammatical errors, improperly dictated words, background noise and other errors  Relevant Orders    MRI thumb right wo contrast    Ambulatory Referral to Hand Surgery    Ambulatory Referral to PT/OT Hand Therapy         Subjective:     Patient ID: Gisselle Templeton is a 63 y o  female  Chief Complaint: This is a right-hand-dominant 53-year-old white female here for a new injury to her right hand and wrist   On March 4, 2022 she fell in her home  She woke up in the middle of the night and went to the kitchen and tripped over a gate  She does not remember how she landed  She felt immediate pain in the hand and wrist   She was seen emergency room and x-rays of the right hand revealed no fractures  She return to the ER yesterday due to continued pain  She also noticed that she was unable to flex her thumb for the past 2 weeks  She had a right distal radius ORIF on October 11, 1298 with no complications        Allergy:  Allergies   Allergen Reactions    Pollen Extract Nasal Congestion     Medications:  all current active meds have been reviewed  Past Medical History:  Past Medical History:   Diagnosis Date    Anxiety     Anxiety disorder     Bipolar 2 disorder ([facility] [address] )     Chronic back pain     Closed fracture of distal end of right fibula with routine healing 2020    COVID-19     in 2021    CVA ([facility] accident[facility])     noted on MRI in the past    Depression     GERD (gastroesophageal reflux disease)     Hypercholesteremia     Hypernatremia     Hypertension     Hypokalemia     Idiopathic chronic pancreatitis ([facility] [facility] ) 3/20/2018    Intervertebral disc disorder with radiculopathy of lumbosacral region     resolved: 2015    Kidney disease     Panic attacks     Pericardial effusion     PONV (postoperative nausea and vomiting)     Radiculitis     resolved: 2015    Secondary renal hyperparathyroidism ([facility] [address] [facility] )     Vitamin D deficiency      Past Surgical History:  Past Surgical History:   Procedure Laterality Date    BUNIONECTOMY      Left foot     COLON SURGERY      COLONOSCOPY  2021    DILATION AND CURETTAGE OF UTERUS      INDUCED       surgically induced    MD ANASTOMOSIS,AV,ANY SITE Left 2019    Procedure: CREATION FISTULA ARTERIOVENOUS (AV) left wrists possible left upper;  Surgeon: Lawrence Romero MD;  [facility];  Service: Vascular    MD CORRJ HALLUX VALGUS W/SESMDC W/DIST Lexie Erps Left 2019    Procedure: Yaneli Beams;  Surgeon: Ana Akhtar DPM;  [facility];  Service: Podiatry    MD Yvocarsoneshjudith W/SESMDC W/DIST Lexie Erps Right 8/3/2020    Procedure: Carmella Slipper;  Surgeon: Ana Akhtar DPM;  [facility];  Service: Podiatry    MD Omid W/SESMDC Huan Balk PHLNX OSTEOT Right 2021    Procedure: Keshia Worthy, right tameka osteotomy and 2nd claw toe correction;  Surgeon: Mariah Silva MD;  [facility];  Service: Orthopedics    MD ERCP DX COLLECTION SPECIMEN BRUSHING/WASHING N/A 2018    Procedure: ENDOSCOPIC RETROGRADE CHOLANGIOPANCREATOGRAPHY (ERCP);   Surgeon: Any Rocha MD;  [facility];  Service: Gastroenterology    CO LAP, SURG PROCTOPEXY N/A 7/13/2018    Procedure: ROBOTIC SIGMOID RESECTION / RECTOPEXY;  Surgeon: Matilda Elias MD;  [facility];  Service: Colorectal    CO OPEN TREATMENT RADIAL SHAFT FRACTURE Right 10/11/2021    Procedure: OPEN REDUCTION W/ INTERNAL FIXATION (ORIF) RADIUS (WRIST), RIGHT DISTAL;  Surgeon: Lenore Donato MD;  [facility];  Service: Orthopedics    CO SIGMOIDOSCOPY FLX DX W/COLLJ [address]/WA IF PFRMD N/A 7/13/2018    Procedure: Regla Sullivan;  Surgeon: Matilda Elias MD;  [facility];  Service: Colorectal    TUBAL LIGATION Bilateral [address] [facility] THYROID BIOPSY  7/30/2019     Family History:  Family History   Problem Relation Age of Onset    Bipolar disorder Mother     Mental illness Mother         depression    Stroke Mother     Dementia Mother     Colon polyps Mother     Heart disease Father     Hypertension Father     Diabetes Father     Other Family         Back disorder    Diabetes Family     Heart disease Family     Hypertension Family     Stroke Family     Thyroid disease Family    [place] Breast cancer Paternal [de-identified]         age unknown   [place] Breast cancer Paternal [de-identified]         age unknown   [place] Breast cancer Maternal Aunt         age unknown    Mental illness Sister     Colon polyps Sister     Mental illness Sister     Heart disease Sister     No Known Problems Sister     Breast cancer Sister 76    Other Son         pituitary tumor    Hypertension Son     Obesity Son     No Known Problems Son     No Known Problems Maternal Grandmother     No Known Problems Maternal Grandfather     No Known Problems Paternal Grandfather     Breast cancer Paternal Aunt         age unknown    Substance Abuse Neg Hx         neg fam hx    Colon cancer Neg Hx      Social History:  Social History Substance and Sexual Activity   Alcohol Use Never     Social History     Substance and Sexual Activity   Drug Use Not Currently    Types: Marijuana     Social History     Tobacco Use   Smoking Status Never Smoker   Smokeless Tobacco Never Used     Review of Systems   Constitutional: Negative  HENT: Negative  Eyes: Negative  Respiratory: Negative  Cardiovascular: Negative  Gastrointestinal: Negative  Endocrine: Negative  Genitourinary: Negative  Musculoskeletal: Positive for arthralgias (Right hand and wrist)  Negative for joint swelling  Skin: Negative  Allergic/Immunologic: Negative  Neurological: Negative  Hematological: Negative  Psychiatric/Behavioral: Negative  Objective:  BP Readings from Last 1 Encounters:   03/23/22 134/74      Wt Readings from Last 1 Encounters:   03/24/22 103 kg (228 lb)      BMI:   Estimated body mass index is 35 71 kg/m² as calculated from the following:    Height as of this encounter: 5' 7" (1 702 m)  Weight as of this encounter: 103 kg (228 lb)  BSA:   Estimated body surface area is 2 13 meters squared as calculated from the following:    Height as of this encounter: 5' 7" (1 702 m)  Weight as of this encounter: 103 kg (228 lb)  Physical Exam  Vitals and nursing note reviewed  Constitutional:       General: She is not in acute distress  Appearance: Normal appearance  She is well-developed  HENT:      Head: Normocephalic and atraumatic  Right Ear: External ear normal       Left Ear: External ear normal    Eyes:      Extraocular Movements: Extraocular movements intact  Conjunctiva/sclera: Conjunctivae normal    Pulmonary:      Effort: Pulmonary effort is normal  No respiratory distress  Musculoskeletal:      Cervical back: Neck supple  Skin:     General: Skin is warm and dry  Neurological:      Mental Status: She is alert and oriented to person, place, and time        Deep Tendon Reflexes: Reflexes are normal and symmetric  Psychiatric:         Mood and Affect: Mood normal          Behavior: Behavior normal        Right Hand Exam     Tenderness   Right hand tenderness location: diffusely over palmar aspect of thumb  No signficant tenderness over scaphoid  Range of Motion   Wrist   Extension: normal   Flexion: normal   Pronation: normal   Supination: normal   Hand   MP Thumb: normal   DIP Thumb: abnormal     Other   Erythema: absent  Scars: present  Sensation: normal  Pulse: present    Comments:  Thumb: Unable to active flex IP joint  Can passively flex  Extension intact  I have personally reviewed pertinent films in PACS and my interpretation is X-rays of the right wrist reveal no visible fractures  Wrist hardware is intact       Scribe Attestation    I,:  Beth Briones PA-C am acting as a scribe while in the presence of the attending physician :       I,:  Chetna Carr MD personally performed the services described in this documentation    as scribed in my presence :

## 2022-03-25 ENCOUNTER — TELEPHONE (OUTPATIENT)
Dept: FAMILY MEDICINE CLINIC | Facility: HOSPITAL | Age: 62
End: 2022-03-25

## 2022-03-25 NOTE — TELEPHONE ENCOUNTER
MRI was done stat since it was done yesterday the same day we ordered it  Please make sure she has an appointment with one of the hand surgeons ASAP to go over MRI  Thanks

## 2022-03-25 NOTE — TELEPHONE ENCOUNTER
Patient is calling in from a missed call- as per notes from PA she is to be seen with hand surgeon as soon as possible to review MRI results  Information forwarded over to SME/practice admin to get the patient to be seen as soon as possible with Dr Yohan Michel as per patient would really like to only be seen in [facility] office for transportation

## 2022-03-25 NOTE — TELEPHONE ENCOUNTER
MRI result in and needs new MRI of right hand  Please schedule  Appointment made with Dr Harinder Wilburn on 4/4

## 2022-03-28 ENCOUNTER — APPOINTMENT (OUTPATIENT)
Dept: OCCUPATIONAL THERAPY | Facility: CLINIC | Age: 62
End: 2022-03-28
Payer: MEDICARE

## 2022-03-28 ENCOUNTER — OFFICE VISIT (OUTPATIENT)
Dept: FAMILY MEDICINE CLINIC | Facility: HOSPITAL | Age: 62
End: 2022-03-28
Payer: MEDICARE

## 2022-03-28 ENCOUNTER — HOSPITAL ENCOUNTER (OUTPATIENT)
Dept: MRI IMAGING | Facility: HOSPITAL | Age: 62
Discharge: HOME | End: 2022-03-28
Payer: MEDICARE

## 2022-03-28 VITALS
HEIGHT: 67 IN | BODY MASS INDEX: 35.38 KG/M2 | HEART RATE: 79 BPM | OXYGEN SATURATION: 99 % | WEIGHT: 225.4 LBS | SYSTOLIC BLOOD PRESSURE: 148 MMHG | DIASTOLIC BLOOD PRESSURE: 86 MMHG

## 2022-03-28 DIAGNOSIS — S99.921A INJURY OF PLANTAR PLATE, RIGHT, INITIAL ENCOUNTER: ICD-10-CM

## 2022-03-28 DIAGNOSIS — K21.9 GASTROESOPHAGEAL REFLUX DISEASE, UNSPECIFIED WHETHER ESOPHAGITIS PRESENT: ICD-10-CM

## 2022-03-28 DIAGNOSIS — R13.10 DYSPHAGIA, UNSPECIFIED TYPE: Primary | ICD-10-CM

## 2022-03-28 DIAGNOSIS — M20.11 ACQUIRED HALLUX INTERPHALANGEUS, RIGHT: ICD-10-CM

## 2022-03-28 DIAGNOSIS — K86.1 IDIOPATHIC CHRONIC PANCREATITIS (HCC): ICD-10-CM

## 2022-03-28 DIAGNOSIS — S69.91XA INJURY OF RIGHT THUMB, INITIAL ENCOUNTER: ICD-10-CM

## 2022-03-28 DIAGNOSIS — M20.5X1 CLAW TOE, ACQUIRED, RIGHT: ICD-10-CM

## 2022-03-28 PROCEDURE — 73218 MRI UPPER EXTREMITY W/O DYE: CPT

## 2022-03-28 PROCEDURE — G1004 CDSM NDSC: HCPCS

## 2022-03-28 PROCEDURE — 99214 OFFICE O/P EST MOD 30 MIN: CPT | Performed by: INTERNAL MEDICINE

## 2022-03-28 RX ORDER — ONDANSETRON 4 MG/1
4 TABLET, FILM COATED ORAL EVERY 8 HOURS PRN
Qty: 20 TABLET | Refills: 1 | Status: SHIPPED | OUTPATIENT
Start: 2022-03-28 | End: 2022-05-11 | Stop reason: SDUPTHER

## 2022-04-04 ENCOUNTER — TELEPHONE (OUTPATIENT)
Dept: FAMILY MEDICINE CLINIC | Facility: HOSPITAL | Age: 62
End: 2022-04-04

## 2022-04-04 ENCOUNTER — OFFICE VISIT (OUTPATIENT)
Dept: OBGYN CLINIC | Facility: CLINIC | Age: 62
End: 2022-04-04
Payer: MEDICARE

## 2022-04-04 VITALS
HEIGHT: 67 IN | BODY MASS INDEX: 35.71 KG/M2 | WEIGHT: 227.5 LBS | DIASTOLIC BLOOD PRESSURE: 88 MMHG | SYSTOLIC BLOOD PRESSURE: 136 MMHG

## 2022-04-04 DIAGNOSIS — T84.84XA PAINFUL ORTHOPAEDIC HARDWARE (HCC): ICD-10-CM

## 2022-04-04 DIAGNOSIS — R51.9 HEADACHE: ICD-10-CM

## 2022-04-04 DIAGNOSIS — S56.019A RUPTURE OF FLEXOR POLLICIS LONGUS MUSCLE: Primary | ICD-10-CM

## 2022-04-04 PROCEDURE — 99214 OFFICE O/P EST MOD 30 MIN: CPT | Performed by: ORTHOPAEDIC SURGERY

## 2022-04-04 RX ORDER — SENNOSIDES 8.6 MG
650 CAPSULE ORAL EVERY 8 HOURS PRN
Qty: 30 TABLET | Refills: 0 | Status: ON HOLD | OUTPATIENT
Start: 2022-04-04 | End: 2022-05-27 | Stop reason: ALTCHOICE

## 2022-04-04 RX ORDER — CEFAZOLIN SODIUM 2 G/50ML
2000 SOLUTION INTRAVENOUS ONCE
Status: CANCELLED | OUTPATIENT
Start: 2022-05-26 | End: 2022-04-04

## 2022-04-04 NOTE — TELEPHONE ENCOUNTER
Pt Rosuvastatin 20mg  Went to get filled and was $40  Asking if there is any generic or replacement med that may be cheaper for her   PCB

## 2022-04-04 NOTE — PATIENT INSTRUCTIONS
Tendon Transfer Surgery    What is Tendon Transfer Surgery? Tendon transfer surgery is a type of hand surgery that is performed in order to improve lost hand function  A functioning tendon is shifted from its original attachment to a new one to restore the action that has been lost     Who needs Tendon Transfer Surgery? Many different conditions can be treated by tendon transfer surgery  Tendon transfer surgery is necessary when a certain muscle function is lost because of a nerve injury  If a nerve is injured and cannot be repaired, then the nerve no longer sends signals to certain muscles  Those muscles are paralyzed and their muscle function is lost  Tendon transfer surgery can be used to attempt to replace that function  Common nerve injuries that are treated with tendon transfer surgery are spinal cord, radial nerve, ulnar nerve, or median nerve injury  Tendon transfer surgery may also be necessary when a muscle has ruptured or been lacerated and cannot be repaired  Common muscle or tendon injuries that are treated with tendon transfer surgery are tendon ruptures due to rheumatoid arthritis or fractures  Also, tendon lacerations that cannot be repaired after injury may be treated with tendon transfer surgery  Tendon transfer surgery may also be needed if a muscle function has been lost due to a disorder of the nervous system  In this situation, the nervous system disease or injury prevents normal nerve signals from being sent to a muscle, and imbalance in hand function occurs  The muscle imbalance or muscle loss due to nervous system disease may be treated with tendon transfers  Common nervous system disorders treated with tendon transfer surgery are cerebral palsy, stroke, traumatic brain injuries, and spinal muscle atrophy  Finally, there are some conditions in which babies are born without certain muscle functions   In these situations, the missing muscle functions can sometimes be treated with tendon transfer surgery  Common conditions treated this way include hypoplastic (underdeveloped) thumbs and birth brachial plexopathy (nerve problem with paralysis)  What happens during Tendon Transfer Surgery? Below the elbow, there are over forty muscles  Each different muscle has a different function  For example, there are 9 muscles that move the thumb  Each muscle has a starting point (origin), and tapers down from its muscle belly into a tendon that then attaches onto bone (insertion) in a specific place; when the muscle fires (contracts), it causes a certain motion (action)  During tendon transfer surgery, the origin of the muscle is left in place; the nerve supply and blood supply to the muscle is left in place  The tendon insertion (attachment) onto bone is detached and re-sewn into a different place  It can be sewn into a different bone, or it can be sewn into a different tendon  After its insertion has been moved, when the muscle fires, it will produce a different action, depending on where it has been inserted  What are the alternatives to Tendon Transfer Surgery? You can discuss other treatment choices with your hand surgeon  Other options may include repairing the nerve that has been injured, or repairing the tendon or muscle that has been injured  In some cases, tendon grafts can be used, in which a portion of intact tendon is removed, without its muscle, and used to bridge a gap in an injured tendon  In other cases, tendon lengthening or bone fusions may be necessary as part of reconstructing hand function  What are the risks to Tendon Transfer Surgery? All surgery has some risk, although those risks may be small  All surgeries produce a surgical scar  Surgical incisions may develop infection   All surgeries require the use of anesthesia, and will require a complete history and physical examination by your primary care physician to determine if you have medical risks associated with anesthesia  Evaluation by the anesthesiologist will help outline your risks and options  When a tendon is transferred and sewn into another position, the tendon transfer will need a period of time to heal, usually about one to two months  A splint or cast may be used, followed by therapy to teach you the new tendon function  Finally, exercises will be needed to strengthen the muscle after your hand surgeon feels the tendon transfer has sufficiently healed  You will need to follow post-operative instructions  Movement too early can lead to rupture of the tendon transfer  Movement too late can lead to excessive scarring of the tendon with resultant stiffness  Discussion of your individual case with your hand surgeon will help you further understand the risks and benefits associated with tendon transfer surgery  What is an example of Tendon Transfer Surgery? After a fracture of the wrist, the fragments of bone associated with the fracture may erode the tendon that straightens the tip of the thumb  In this situation, the thumb tip would not be able to move upward (extend)  The muscle that extends the tip of the thumb is the extensor pollicus longus (EPL) (see Figure 1)  In this example, the EPL tendon typically cannot be directly repaired because it is too frayed  There are two muscles that extend the index finger  Just like the body has two kidneys, so that one can be spared if necessary, the body also has two tendons that extend the index finger  One tendon is left intact on the index finger so that it won't lose extension, and the extra tendon can be transferred over to the thumb to replace the lost function  The tendon insertion of the extra index finger extensor tendon (Extensor indicus proprius--EIP) is detached  The EIP tendon is then re-directed and sewn into the thumb bone or thumb tendon (EPL)   After this type of surgery, a splint or cast is used for one month, after which supervised therapy may be started to re-learn how to use the transferred tendon to extend the thumb  Gentle movement with a protective splint may be used for an additional month  If adequate progress is made, the therapy may be advanced one month later to re-learn activities and to strengthen the muscle, with restoration of the ability to extend the thumb  A supervised hand therapy program for retraining the muscles is extremely important to achieve the best possible function  [facility]  Angely Prakash MD  Chief of [address] [place] [address]  [phone]  You have chosen to undergo surgery for your condition  Any surgery is associated with risks of potential complications  Certain medical problems such as smoking, diabetes, thyroid disease, neuropathy, malnutrition or bleeding problems may increase your risk of complications  Complications after surgery are rare but include the following:   Bleeding Infection   Scar Pain   Tenderness Problems with healing   Damage to nerves Damage to blood vessels   Lack of desired results Need for further surgery   Numbness Stiffness   Problems with anesthesia Blood clots   Need for hardware removal (if inserted)    If bony work is done, other risks include:   Delayed bone healing   Lack of bone healing   Bones healing in wrong position    While these risks and complications are rare and infrequent they are still important to know and discuss  If you have any other questions, please let me know  _____________________________________________________________________________________  It can be difficult, but it is possible to get on with your life with only one hand for the first few weeks after your operation  The following are a few suggestions that may be helpful when you're recovering from your hand problem or from your hand surgery   While most of these suggestions are really only applicable if your dominant or your writing hand is affected, some apply to problems involving either hand  Most daily activities can be accomplished with some modifications, rather than the need for store bought devices  Before surgery, if you can:   Ask for help: Have others help you with: childcare, housework, and meals   Practice: Dressing, undressing, using the toilet, brushing your teeth, showering   Prepare: for the first few days after surgery  o Open and re-sealed cans and bottles that you may need   o Open medication containers and leave them easy-to-read open  Make sure you put these medication containers out of reach of children, even if you don't expect children to visit you   o Prepare and think about no-cutmeals-sandwiches, ground meats, etc     It helps to have   In the shower  o Plastic bags and rubber bands to cover bandages-the [place] that newspapers come in are good  Also, small Palamid[place]can liners will work  Use 2 of these at a time  The other option is an oversized rubber glove that may be purchased at a food store to aid in dishwashing   o A bottle sponge (soft sponge on a long stick)-for the armpit of yourgood hand  o Shower brush  o At [place] in the shower will help you to wash your hair  o A cotton terrycloth bathrobe to aid you in drying your back     In the bathroom  o Toothpaste, shampoo, etc  in a flip top or pump dispenser    o Flossers (dental floss on aYshaped handle)  o Consider an electric razor     In the bedroom  o Back scratcher  o Large sleeve shirts and tops  o Put away clothing which buttons, fastens or snaps in the back, or which uses drawstrings     In the kitchen  o A rubber jar opener Vinicius-to help open jars, but also to keep things from sliding around while you are working on them   o Double suction cup pads (the little octopus)-to hold items while you use or wash them  o An electric can opener with a lid magnet strong enough to hold the can in the air-for one-handed use    Consider Linward Deep and wear haircut  Camella Hazard! Incision & Scar Care   You should keep your bandages dry and clean; change your dressings if you see drainage coming through your dressings   Signs of infection include increased pain, swelling, redness, warmth, and excessive or foul-smelling drainage  Please contact our office if you experience signs of infection   You may wash with soap and water after given permission   Sutures will be removed between 7-14 days after surgery if the wound is healed  Patients who smoke, have diabetes, or have nutritional problems may need longer to heal    Steri-strips may be placed across your incision at this time, which will fall off or peel away   To help prevent infection, do not submerse your incision area for 2-3 weeks (i e  no hot tubs, pools, ocean, dish water, fish ponds, fish tanks, bathtubs)   Swelling, bruising, and numbness are common after surgery  To help reduce these symptoms, keep the area elevated  Scar Care: To improve the appearance of your scar, you can massage the healed area (using circular motions with your fingertip) for 5 minutes 3x a day after your steri-strips peel away  You can use regular hand lotion or Scar zone from the store  You may also use Silicone pads after the stitches are removed (available at the store)  Redness and bumpiness of the scar are expected  These generally improve as healing progresses, but redness can be expected for up to 6-8 months  ** If you have any questions or concerns, please call our office  [phone]  _____________________________________________________________________________________  Pain Management  Pain after an injury or surgery is common and should often be expected  There are many ways to manage and reduce this pain  This often does not include medications or may not exclusively include medication   Each patient, surgery and surgeon are unique, and the approach to management of pain is individual   It is important to try to discuss your concerns and expectations regarding pain with your surgical team before and after surgery  They want to help you get better and have a good patient experience  Your patient experience includes understanding and treating your pain  Here's what you may expect before surgery and how to manage your pain and medications after surgery  Again, the approach to pain management after injury or surgery is individualized, and this is general information  Your surgical team will have more specific recommendations for you  Before using any of the methods explored here, please discuss with your medical team if these pain management methods are appropriate for you  We advise good communication with your team to let them help you achieve the best outcome  Surgery Day  As your surgery begins, your surgeon and anesthesia team may give you medications by mouth, IV, and/or injection  Giving you medication as the surgery progresses not only helps you to decrease pain during the surgery, but it also reduces your pain post-surgery  You may also receive medication in the recovery room after surgery, if needed  In some cases, you will receive prescription pain medication and instructions for its use to use at home in the days following your surgery  You may also receive instructions on using over-the-counter pain medications  It is important to follow these directions carefully, as many over-the counter medications contain some of the same ingredients as prescription pain medications and using them together can result in a dangerous accidental overdose  Post-Surgery Pain Management  While always important to follow your specific postoperative instructions, here are some different methods, outside of medication, that your team may recommend to reduce your pain:   Elevate: Elevating the injured area so it is higher than your heart can reduce swelling and pain   Swelling can increase quickly by putting your hand at your side, and this can make your dressing feel tight  Often, the pain associated with swelling is difficult to control, so it is best to avoid this problem   Take care of your dressing: If your dressing/splint feels tight, and elevation for 10 minutes does not improve the tight sensation, contact your surgical team  It may be recommended that you unravel any tape or elastic wrap and loosen the outer bandage  If this does not help, you may be advised to tear, unravel, or cut the inner layers with blunt tipped scissors  Make sure you are cutting on the opposite side of where your incision is located  When done, you will need to try to rebuild your dressing to keep your wound clean and covered  Before doing any of this, check with your medical team  They may want to be aware of the tight dressing and could have different instructions for loosening the dressing   Keep moving: If allowed by your surgeon, try to frequently move the fingers, wrist, elbow, and/or shoulder that are outside of the splint or cast  You can do this gently and slowly  This improves blood flow, which limits swelling and prevents bandages from feeling tight  It may be uncomfortable to move at first, but the discomfort will often improve with time and frequently improves with motion  Your surgeon will be more specific about what to move and what to rest    Ice the area: Icing the painful area will typically reduce swelling and inflammation and reduce pain  However, there may be certain procedures (such as surgery on arteries, skin grafts or flaps) where ice could be harmful, so consult your surgeon before using ice   Heat the area: If you are in the phase of care where you can remove your dressing or splint, you may be able to try heat  Heat increases blood flow to an area and can help with muscle spasms, muscle soreness and joint pain   Avoid smoking: Chemicals present in cigarettes can increase pain  Reducing or quitting smoking can improve your pain   Consume vitamin C: Consuming 500mg of vitamin C daily for 6 weeks may reduce pain after some injuries  However, it is ascorbic acid, which can upset your stomach if you have heartburn or gastritis  Post-Surgery Medication Management  The pain-management methods listed above are often effective when used in combination with taking medications post-surgery  There are many different classes of medication that can help pain  Some can be purchased over the counter, and some require a prescription  All medicines can have some benefits and some adverse reactions/side effects  Your surgical team will balance these issues to provide a plan for you  Some commonly prescribed medications can include:   Tylenol (Acetaminophen)   Aleve (Naprosyn/naproxen)   Motrin/Advil (Ibuprofen)   Celebrex (Celecoxib)   Toradol (Ketorolac)    When taking medication, keep the following in mind:   It may take 30-60 minutes for your body to absorb the medication after you take it by mouth, so be patient   Longer-acting medications used before bedtime may help you sleep better the first few nights after surgery   The first few nights post-surgery will generally be the toughest    Do not exceed the dose recommended by your physician or combine medications without consulting with your physician  If you are unfamiliar with these medications, your surgeon can specify how much medication you should take, for how long, and how often  Opioids  Opioids are a type of pain medication made from the poppy plant that is used to make opium and heroin  They can be effective in treating pain, but opioids should be used at a last resort, in limited amounts, and for a limited number of days  Use of these medications should only be done under the guidance of your doctor   When taking opioids, you are at risk of becoming dependent on the medicine, and they may become less effective over time   Oxycodone and hydrocodone are two of the most commonly used and effective opioid pain pills  These pills are frequently combined with Tylenol (acetaminophen), but it must be done carefully  Be sure to consult your surgeon before doing so  Your surgeon will give you a customized plan for managing your pain based on your type of surgery, number of procedures, duration of surgery, etc  Keep in mind that many opioids are combined with Tylenol (acetaminophen) already in the pill, so take care to follow your prescribed directions and not to take more opioids or acetaminophen than prescribed  Overdoses of each of these medications can be dangerous and life threatening  Learn more about opioids, including the side effects, how to safely use them, and how to properly dispose of any extras  Following the program below will greatly decrease your post-operative pain  1  Aleve (naproxen) 220 mg and Tylenol Arthritis 650 mg on the afternoon/evening of surgery  Do NOT take Aleve if you have a history of gastric ulcers, uncontrolled reflux or have been told previously by a physician that you should not take anti-inflammatory medications such as Advil/Aleve/Motrin  2  Aleve (naproxen) 220 mg in the morning and afternoon, for about 2-3 days after the surgery; even if you have no pain  You can stop two days after surgery if your hand does not hurt  3  Tylenol Arthritis (or any brand of acetaminophen 8-hour), 650 mg every eight hours, with a maximum dose of 3000 mg per day, for about 2-3 days after the surgery, even if you have no pain  Tylenol Arthritis plus Aleve is a case of 1+1=3, not 2  That is, they work together as a team to make each other stronger a  The maximum amount of Tylenol is 3000 mg per day, which is the same as 4 of the 650 mg pills  Remember; don't substitute any other medication for the Tylenol: don't take Motrin, aspirin, or any other over the counter medication   It must be Tylenol (or any brand of acetaminophen) for it to work as a team  Remember as well that Tylenol Arthritis is taken every 8 hours, the Aleve is twice a day  4  Norco (hydrocodone/acetaminophen) 5/325 mg or a similar medication to assist with sleeping at night, and possibly every 6 hours during the day, ONLY IF NEEDED, for the first few days  You will be given a written prescription, but many patients find they do not need to take any or all of the Norco  Do not take the medication just because it was given to you; only take it if you need it  Remember that Elby Seip is an opioid pain medication and can lead to addiction, respiratory sedation, and death  In 2015 over 17,500 Americans  from opioid overdoses and I don't want this to happen to you  Opioid medications can also cause constipation, so please plan for that, as well as possible mental confusion and drowsiness  Do not drive while you are taking this medication  With this protocol, you can expect your post-operative pain to be very manageable  The worst pain only lasts for the first 48 hours and improves significantly after that  By the time you see your surgeon for your post-operative visit you probably will no longer require any pain medication on a daily basis  Important Information about Painkillers  You are being prescribed an opioid pain medication to help with severe pain after surgery  Use the medication sparingly as needed to reduce your pain  The goal is not to be pain-free, but to make the pain more tolerable  If you are able to take non-steroidal anti-inflammatory drugs (NSAIDs), alternate the prescription pain medication with over-the-counter Ibuprofen or Naproxen (if you do not have a history of reflux or stomach ulcers)  This will allow you to take less opioid medication  Also, elevate the hand to reduce swelling and consider applying ice to the affected area for 15 minutes at a time, several times per day   Opioid medication is powerful and has the risk of overdose, abuse, and addiction  Only use the medication as directed by your physician and keep the pills in a safe place  When you no longer need the medication, please dispose of the pills properly as directed below  Allowing someone else to use your opioid prescription is illegal   Also, possible side effects from opioid medications are over-sedation, itching, nausea/vomiting, and constipation  Do not drive a vehicle or operate machinery while taking the pain medications  Drink plenty of fluids and consider a stool softener to prevent constipation  If the pain you are experiencing is not severe, stop taking the opioid pills and only take over-the-counter medications such as Tylenol and Ibuprofen  Disposing of unused pain medications:  (1) Follow pharmacist instructions on the bottle if available, or  (2) Call 0-171.195.7035 for a [facility] authorized collection site in your area, or  (3) If no collection site is available in your area, mix the pills with an undesirable substance such as used coffee grounds, jaz litter, or dirt  Place this mixture in a sealed plastic bag  Place the bag in the household garbage  Adapted from the opioid awareness section of the [employer], thanks to Anthony Pagan and Juventino Dunbar

## 2022-04-04 NOTE — PROGRESS NOTES
ASSESSMENT/PLAN:    Assessment:   Right thumb FPL tendon rupture   S/p Right distal radius ORIF 10/21/2021 by Dr Trent Pimentel:   Patient will proceed with right wrist removal of hardware and right ring finger flexor digitorum superficialis to flexor pollicis longus tendon transfer with over 90% success rate     Follow Up: After Surgery    To Do Next Visit:    and Sutures out    General Discussions:       Operative Discussions:     Standard Consent: The risks and benefits of the procedure were explained to the patient, which include, but are not limited to: Bleeding, infection, recurrence, pain, scar, damage to tendons, damage to nerves, and damage to blood vessels, failure to give desired results and complications related to anesthesia  These risks, along with alternative conservative treatment options, and postoperative protocols were voiced back and understood by the patient  All questions were answered to the patient's satisfaction  The patient agrees to comply with a standard postoperative protocol, and is willing to proceed  Education was provided via written and auditory forms  There were no barriers to learning  Written handouts regarding wound care, incision and scar care, and general preoperative information was provided to the patient  Prior to surgery, the patient may be requested to stop all anti-inflammatory medications  Prophylactic aspirin, Plavix, and Coumadin may be allowed to be continued  Medications including vitamin E , ginkgo, and fish oil are requested to be stopped approximately one week prior to surgery  Hypertensive medications and beta blockers, if taken, should be continued  _____________________________________________________  CHIEF COMPLAINT:  Chief Complaint   Patient presents with    Right Thumb - Pain         SUBJECTIVE:  Shaina Meehan is a 63 y o  female who presents with Stiffness/LROM to the right thumb    This started  1 month(s) ago as Due to a personal injury  Patient fell over a doorway gate that she has up for her dogs  She has a hx of a right distal radius fracture in 10/21/2021 by Dr Lissette Burt    Radiation: Yes to the  thumb  Previous Treatments: splint made by OT and activity modification with only partial relief  Associated symptoms: No Complaints  Handedness: right  Work status: not working     PAST MEDICAL HISTORY:  Past Medical History:   Diagnosis Date    Anxiety     Anxiety disorder     Bipolar 2 disorder ([facility] )     Chronic back pain     Closed fracture of distal end of right fibula with routine healing 2020    COVID-19     in 2021    CVA (cerebral vascular accident) ([facility] )     noted on MRI in the past    Depression     GERD (gastroesophageal reflux disease)     Hypercholesteremia     Hypernatremia     Hypertension     Hypokalemia     Idiopathic chronic pancreatitis ([facility] ) 3/20/2018    Intervertebral disc disorder with radiculopathy of lumbosacral region     resolved: 2015    Kidney disease     Panic attacks     Pericardial effusion     PONV (postoperative nausea and vomiting)     Radiculitis     resolved: 2015    Secondary renal hyperparathyroidism ([facility] )     Vitamin D deficiency        PAST SURGICAL HISTORY:  Past Surgical History:   Procedure Laterality Date    BUNIONECTOMY      Left foot     COLON SURGERY      COLONOSCOPY  2021    DILATION AND CURETTAGE OF UTERUS      INDUCED       surgically induced    WA ANASTOMOSIS,AV,ANY SITE Left 2019    Procedure: CREATION FISTULA ARTERIOVENOUS (AV) left wrists possible left upper;  Surgeon: Joanna Cushing, MD;  Location:  MAIN OR;  Service: Vascular    [address]  89  W/SESMDC W/DIST Katiuska Ku Left 2019    Procedure: Malika Alberts;  Surgeon: Bimal Palencia DPM;  Location: [facility] MAIN [facility];  Service: Podiatry    [place] W/SESMDC W/DIST Katiuska Ku Right 8/3/2020    Procedure: Kameron Saucedo;  Surgeon: Bimal Palencia DPM;  Location: [facility] MAIN [facility];  Service: [address] W/SESMDC W/PROX PHLNX OSTEOT Right 9/27/2021    Procedure: [place], right tameka osteotomy and 2nd claw toe correction;  Surgeon: Daja Tinoco MD;  Location: [facility] MA[facility];  Service: Orthopedics    WI ERCP DX COLLECTION SPECIMEN BRUSHING/WASHING N/A 4/11/2018    Procedure: ENDOSCOPIC RETROGRADE CHOLANGIOPANCREATOGRAPHY (ERCP);   Surgeon: Kyle Du MD;  Location: [facility];  Service: Gastroenterology    WI LAP, SURG PROCTOPEXY N/A 7/13/2018    Procedure: ROBOTIC SIGMOID RESECTION / RECTOPEXY;  Surgeon: Lovely Garces MD;  Location: [facility] MA[facility];  Service: Colorectal    WI OPEN TREATMENT RADIAL SHAFT FRACTURE Right 10/11/2021    Procedure: OPEN REDUCTION W/ INTERNAL FIXATION (ORIF) RADIUS (WRIST), RIGHT DISTAL;  Surgeon: Shahida Montoya MD;  Location[facility];  Service: Orthopedics    WI SIGMOIDOSCOPY FLX DX W/COLLJ SPEC BR/WA IF PFRMD N/A 7/13/2018    Procedure: Galilea Giles;  Surgeon: Lovely Garces MD;  Location: [facility];  Service: Colorectal    TUBAL LIGATION Bilateral 1997   [facility] 47 THYROID BIOPSY  7/30/2019       FAMILY HISTORY:  Family History   Problem Relation Age of Onset    Bipolar disorder Mother     Mental illness Mother         depression    Stroke Mother     Dementia Mother     Colon polyps Mother     Heart disease Father     Hypertension Father     Diabetes Father     Other Family         Back disorder    Diabetes Family     Heart disease Family     Hypertension Family     Stroke Family     Thyroid disease Family     Breast cancer Paternal [de-identified]         age unknown   Elías Bones Breast cancer Paternal [de-identified]         age unknown   Elías Bones Breast cancer Maternal Aunt         age unknown    Mental illness Sister     Colon polyps Sister     Mental illness Sister     Heart disease Sister     No Known Problems Sister     Breast cancer Sister 76    Other Son         pituitary tumor  Hypertension Son     Obesity Son     No Known Problems Son     No Known Problems Maternal Grandmother     No Known Problems Maternal Grandfather     No Known Problems Paternal Grandfather     Breast cancer Paternal Aunt         age unknown    Substance Abuse Neg Hx         neg fam hx    Colon cancer Neg Hx        SOCIAL HISTORY:  Social History     Tobacco Use    Smoking status: Never Smoker    Smokeless tobacco: Never Used   Vaping Use    Vaping Use: Never used   Substance Use Topics    Alcohol use: Never    Drug use: Not Currently       MEDICATIONS:    Current Outpatient Medications:     albuterol (Ventolin HFA) 90 mcg/act inhaler, Inhale 2 puffs every 6 (six) hours as needed for wheezing or shortness of breath, Disp: 8 5 g, Rfl: 3    AMILoride 5 mg tablet, Take 1 tablet (5 mg total) by mouth 2 (two) times a day, Disp: 180 tablet, Rfl: 3    aspirin 81 mg chewable tablet, Chew 1 tablet (81 mg total) daily, Disp: , Rfl:     carvedilol (COREG) 25 mg tablet, Take 1 tablet (25 mg total) by mouth 2 (two) times a day with meals, Disp: 180 tablet, Rfl: 3    cholecalciferol (VITAMIN D3) 1,000 units tablet, Take 5 tablets (5,000 Units total) by mouth daily, Disp: 90 tablet, Rfl: 5    clonazePAM (KlonoPIN) 0 5 mg tablet, Take 1 tablet (0 5 mg total) by mouth 3 (three) times a day, Disp: 270 tablet, Rfl: 0    fluvoxaMINE (LUVOX) 100 mg tablet, 2 (two) times a day 1 tab am , 2 tabs HS, Disp: , Rfl:     HYDROcodone-acetaminophen (NORCO) 5-325 mg per tablet, Take 1 tablet by mouth every 8 (eight) hours as needed (moderate-severe headache pain) for up to 10 doses Max Daily Amount: 3 tablets, Disp: 10 tablet, Rfl: 0    lamoTRIgine (LaMICtal) 200 MG tablet, Take 200 mg by mouth 2 (two) times a day , Disp: , Rfl:     linaCLOtide (Linzess) 290 MCG CAPS, Take 1 capsule by mouth daily, Disp: 90 capsule, Rfl: 0    omeprazole (PriLOSEC) 40 MG capsule, Take 1 capsule (40 mg total) by mouth daily before breakfast, Disp: 90 capsule, Rfl: 3    ondansetron (ZOFRAN) 4 mg tablet, Take 1 tablet (4 mg total) by mouth every 8 (eight) hours as needed for nausea or vomiting, Disp: 20 tablet, Rfl: 1    Polyethylene Glycol 3350 (MIRALAX PO), Take by mouth as needed , Disp: , Rfl:     QUEtiapine (SEROquel) 100 mg tablet, Take 1 tablet by mouth daily after breakfast , Disp: , Rfl:     QUEtiapine (SEROquel) 300 mg tablet, 1 at bedtime along with a 400 mg tab, Disp: , Rfl:     QUEtiapine (SEROquel) 400 MG tablet, 1 at bedtime along with a 300 mg tab , Disp: , Rfl:     rosuvastatin (CRESTOR) 20 MG tablet, Take 1 tablet (20 mg total) by mouth daily, Disp: 90 tablet, Rfl: 1    budesonide-formoterol (SYMBICORT) 160-4 5 mcg/act inhaler, Inhale 2 puffs 2 (two) times a day Rinse mouth after use  (Patient not taking: Reported on 2/14/2022 ), Disp: 30 6 g, Rfl: 3    ALLERGIES:  Allergies   Allergen Reactions    Pollen Extract Nasal Congestion       REVIEW OF SYSTEMS:  Pertinent items are noted in HPI      LABS:  HgA1c:   Lab Results   Component Value Date    HGBA1C 5 4 03/24/2020     BMP:   Lab Results   Component Value Date    CALCIUM 9 1 03/04/2022     02/27/2017    K 4 6 03/04/2022    CO2 26 03/04/2022     03/04/2022    BUN 40 (H) 03/04/2022    CREATININE 2 64 (H) 03/04/2022         _____________________________________________________  PHYSICAL EXAMINATION:  Vital signs: /88   Ht 5' 7" (1 702 m)   Wt 103 kg (227 lb 8 oz)   LMP  (LMP Unknown)   BMI 35 63 kg/m²   General: well developed and well nourished, alert, oriented times 3 and appears comfortable  Psychiatric: Normal  HEENT: Trachea Midline, No torticollis  Cardiovascular: No discernable arrhythmia  Pulmonary: No wheezing or stridor  Abdomen: No rebound or guarding  Extremities: No peripheral edema  Skin: No masses, erythema, lacerations, fluctation, ulcerations  Neurovascular: Sensation Intact to the Median, Ulnar, Radial Nerve, Motor Intact to the Median, Ulnar, Radial Nerve and Pulses Intact    MUSCULOSKELETAL EXAMINATION:  RIGHT SIDE:  Crepitation over distal radius plate with finger flexion, fdp to ring 5/5, fds to ring 5/5, no active thumb flexion  NVI     _____________________________________________________  STUDIES REVIEWED:  Images were reviewed in PACS by Dr Jim Wilcox and demonstrate: MRI of right hand on 3/28/2022 demonstrates a ruptured FPL tendon         PROCEDURES PERFORMED:  Procedures  No Procedures performed today   Scribe Attestation    I,:  Diane Villa am acting as a scribe while in the presence of the attending physician :       I,:  Emeli Blancas MD personally performed the services described in this documentation    as scribed in my presence :

## 2022-04-05 DIAGNOSIS — E78.00 HYPERCHOLESTEREMIA: ICD-10-CM

## 2022-04-05 RX ORDER — ROSUVASTATIN CALCIUM 20 MG/1
20 TABLET, COATED ORAL DAILY
Qty: 90 TABLET | Refills: 1 | Status: SHIPPED | OUTPATIENT
Start: 2022-04-05 | End: 2022-06-06 | Stop reason: SDUPTHER

## 2022-04-05 RX ORDER — HYDROCODONE BITARTRATE AND ACETAMINOPHEN 5; 325 MG/1; MG/1
1 TABLET ORAL EVERY 8 HOURS PRN
Qty: 10 TABLET | Refills: 0 | Status: SHIPPED | OUTPATIENT
Start: 2022-04-05 | End: 2022-04-07 | Stop reason: SDUPTHER

## 2022-04-06 ENCOUNTER — TELEPHONE (OUTPATIENT)
Dept: FAMILY MEDICINE CLINIC | Facility: HOSPITAL | Age: 62
End: 2022-04-06

## 2022-04-06 NOTE — TELEPHONE ENCOUNTER
Spoke to pharmacist, patient told to take extra strength Tylenol for ortho pain, and they are treating her for chronic pain; I recommend pharmacist tell patient to have specialist deal with her pain meds

## 2022-04-07 ENCOUNTER — TELEPHONE (OUTPATIENT)
Dept: FAMILY MEDICINE CLINIC | Facility: HOSPITAL | Age: 62
End: 2022-04-07

## 2022-04-07 DIAGNOSIS — R51.9 HEADACHE: ICD-10-CM

## 2022-04-07 DIAGNOSIS — F31.81 BIPOLAR 2 DISORDER (HCC): Chronic | ICD-10-CM

## 2022-04-07 RX ORDER — CLONAZEPAM 0.5 MG/1
0.5 TABLET ORAL 3 TIMES DAILY
Qty: 270 TABLET | Refills: 0 | Status: SHIPPED | OUTPATIENT
Start: 2022-04-07 | End: 2022-06-20 | Stop reason: SDUPTHER

## 2022-04-07 RX ORDER — HYDROCODONE BITARTRATE AND ACETAMINOPHEN 5; 325 MG/1; MG/1
1 TABLET ORAL EVERY 8 HOURS PRN
Qty: 10 TABLET | Refills: 0 | Status: SHIPPED | OUTPATIENT
Start: 2022-04-07 | End: 2022-04-08 | Stop reason: SDUPTHER

## 2022-04-07 NOTE — TELEPHONE ENCOUNTER
Spoke with Dr Kena Diaz she would like the patient to  a symbicort 160 mg sample in [facility] as well as come in to see her within the next few weeks  I called patient and left her a message to give me a call regarding this matter and that we do have a sample for her and would like to squeeze her in on 8/27 at 1pm with Dr Kena Diaz in [place] [address]  If patient calls back please transfer to me  Azelaic Acid Counseling: Patient counseled that medicine may cause skin irritation and to avoid applying near the eyes.  In the event of skin irritation, the patient was advised to reduce the amount of the drug applied or use it less frequently.   The patient verbalized understanding of the proper use and possible adverse effects of azelaic acid.  All of the patient's questions and concerns were addressed.

## 2022-04-07 NOTE — TELEPHONE ENCOUNTER
Cancel and have her follow with ortho- I miss our old system where we would have an alert if something was deferred like this

## 2022-04-07 NOTE — TELEPHONE ENCOUNTER
Pt would like to speak to Taylor Cedillo about problems with her wrist after surgery  States she is having pain and now bad headaches  Pt made aware that Taylor Cedillo not in until tomorrow

## 2022-04-07 NOTE — TELEPHONE ENCOUNTER
Called [facility] and message given to Reyna Last - pharmacist - she will let PT know that she has to ask Ortho for pain medication

## 2022-04-08 ENCOUNTER — TELEPHONE (OUTPATIENT)
Dept: FAMILY MEDICINE CLINIC | Facility: HOSPITAL | Age: 62
End: 2022-04-08

## 2022-04-08 NOTE — TELEPHONE ENCOUNTER
Julián Turner called  States Tylenol not helping which [place] gave her  Dr Lelia Jain called in hydrocodone yesterday  Pharmacy did not fill  Julián Turner is asking for a call to straighten this out    Thanks

## 2022-04-12 ENCOUNTER — TELEPHONE (OUTPATIENT)
Dept: GASTROENTEROLOGY | Facility: CLINIC | Age: 62
End: 2022-04-12

## 2022-04-18 ENCOUNTER — TELEPHONE (OUTPATIENT)
Dept: OBGYN CLINIC | Facility: CLINIC | Age: 62
End: 2022-04-18

## 2022-04-18 DIAGNOSIS — R51.9 HEADACHE: ICD-10-CM

## 2022-04-18 RX ORDER — HYDROCODONE BITARTRATE AND ACETAMINOPHEN 5; 325 MG/1; MG/1
1 TABLET ORAL EVERY 8 HOURS PRN
Qty: 10 TABLET | Refills: 0 | Status: ON HOLD | OUTPATIENT
Start: 2022-04-18 | End: 2022-05-27 | Stop reason: ALTCHOICE

## 2022-04-18 NOTE — TELEPHONE ENCOUNTER
Patient is asking should she be doing balance therapy before surgery or should she wait until after     Please call at [phone]

## 2022-04-21 NOTE — CASE MANAGEMENT
1025 - Patient of [facility] presents with c/o cramping. Cook Gestation today at 37.3 weeks    Reports she starting having cramping and irregular contractions last night. Reports she goes through labor quickly once she starts and would like to be checked out to see how far she is.   Reports she has a large amount of amniotic fluid - 23 and she was told her placenta was starting to calcify. Otherwise denies problems with pregnancy.   Denies ROM or bleeding. Denies change to vision/edema/HA, Reports FM. FM/TOCO use discussed and placed, POC discussed.     SVE - 1/50/ballotable      Dry erase board updated with RN contact information; reviewed.   Patient encouraged to call RN with all questions concerns needs prn.  Water/ice available/encouraged at the BS    1255 - Report to Dr. Negrete regarding patient arrival/complaint/status.  Patient discharged home with specific instruction to return to L&D/Physician ie.. Bleeding/ROM/decreased FM/labor/concerns for self or baby. Patient denies questions/concerns regarding care since arrival to [facility] or POC to discharge home. Patient has a scheduled appointment with Dr. Negrete tomorrow.    Initial Clinical Review    Admission: Date/Time/Statement: 11/7/17 @ 2015     Orders Placed This Encounter   Procedures    Inpatient Admission (expected length of stay for this patient is greater than two midnights)     Standing Status:   Standing     Number of Occurrences:   1     Order Specific Question:   Admitting Physician     Answer:   Araceli Ceja [[id number]]     Order Specific Question:   Level of Care     Answer:   Med Surg [[id number]]     Order Specific Question:   Estimated length of stay     Answer:   More than 2 Midnights     Order Specific Question:   Certification     Answer:   I certify that inpatient services are medically necessary for this patient for a duration of greater than two midnights  See H&P and MD Progress Notes for additional information about the patient's course of treatment  ED: Date/Time/Mode of Arrival:   ED Arrival Information     Expected Arrival Acuity Means of Arrival Escorted By Service Admission Type    - 11/7/2017 18:20 Urgent [facility] Urgent    Arrival Complaint    abnormal labs          Chief Complaint:   Chief Complaint   Patient presents with    Abnormal Lab     To ED for evaluation of low potassium/elevated kidney values  Denies any complaints  Patient states that she has been taking a daily potassium suppliment for one month  History of Illness:  56 y o  female with history of laxative abuse, CKD stage 3, and bipolar disorder who presents with complaints of lightheadedness and dizziness, nausea, vomiting, and diarrhea  Patient was seen by PCP for annual checkup yesterday  She received call from PCP office today instructing her to go to ED for abnormal lab values  In the ED her potassium was 2 6 and her creatinine was 2 76  Penny Messer Patient was hospitalized in January of this year for similar symptoms  Patient states that she takes approximately 30 laxatives per day to lose weight    She is currently seeing a psychiatrist and therapist for this issue     (labs 1/20- bun 17  Creatinine 2 12)    ED Vital Signs:   ED Triage Vitals [11/07/17 1839]   Temperature Pulse Respirations Blood Pressure SpO2   98 °F (36 7 °C) 80 20 145/77 99 %      Temp Source Heart Rate Source Patient Position - Orthostatic VS BP Location FiO2 (%)   Temporal Monitor Sitting Right arm --      Pain Score       No Pain        Wt Readings from Last 1 Encounters:   11/08/17 60 5 kg (133 lb 6 1 oz)       Vital Signs (abnormal): none    Abnormal Labs/Diagnostic Test Results:   K 2 6  CO2-20  Bun 21  Creatinine 2 76  Alkaline phosphatase 188  Total protein 6  Albumin 3 2  Phosphorous 6  4    hgb 11 1, hct 34 2  EKG - sinus  Prolonged qTc    Labs am 11/8- K 2 8, cl 115  Co2- 18  Bun 21  Creatinine 2 42  Calcium 8 2  Alkaline phosphatase 164  Total protein 5 1  Albumin 2 6  Phosphorous 5  1    hgb 10 7, hct 33 7    ED Treatment:   Medication Administration from 11/07/2017 1820 to 11/07/2017 2109       Date/Time Order Dose Route Action Action by Comments     11/07/2017 2100 sodium chloride 0 9 % bolus 1,000 mL 0 mL Intravenous Stopped Laura Rod RN      11/07/2017 1912 sodium chloride 0 9 % bolus 1,000 mL 1,000 mL Intravenous [id number] [facility] Priscila Edmond RN      11/07/2017 1857 potassium chloride 40 mEq IVPB (premix) 40 mEq Intravenous Not Given [place], RN      11/07/2017 2100 potassium chloride 20 mEq IVPB (premix) 0 mEq Intravenous Stopped Laura Rod RN      11/07/2017 2048 potassium chloride 20 mEq IVPB (premix) 20 mEq Intravenous 19404 [facility] Priscila Edmond RN      11/07/2017 2042 potassium chloride 20 mEq IVPB (premix) 0 mEq Intravenous Stopped [place] Setting, RN      11/07/2017 1912 potassium chloride 20 mEq IVPB (premix) 20 mEq Intravenous [place], RN           Past Medical/Surgical History:    Active Ambulatory Problems     Diagnosis Date Noted    Acute kidney injury superimposed on chronic kidney disease ([facility] ) 01/17/2017    Hypokalemia 01/17/2017    B[facility] 01/17/2017    Hypercholesteremia 01/17/2017    Spondylolisthesis at L5-S1 level 01/20/2017     Resolved Ambulatory Problems     Diagnosis Date Noted    Rapid weight loss 35/83/3568    Metabolic acidosis, increased anion gap 01/18/2017    Severe protein-calorie malnutrition ([facility] ) 01/19/2017     Past Medical History:   Diagnosis Date    Ankle sprain     Bipolar 2 disorder (HCC)     Chronic back pain     Hypercholesteremia     Panic attacks        Admitting Diagnosis: Hypokalemia [E87 6]  Renal insufficiency [N28 9]  Prolonged QT interval [R94 31]  Abnormal laboratory test [R89 9]    Age/Sex: 56 y o  female  Assessment/Plan: Acute kidney injury superimposed on CKD stage 3:  Most likely pre renal as patient has frequent diarrhea   ? Inpatient consult Nephrology  ? IV fluids for hydration  ? Repeat CMP in a m   ? 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:  Patient takes laxatives for weight loss  Hold all laxatives  Patient sees psychiatrist and therapist as outpatient  Refusing inpatient psych consult  Bipolar disorder:  Continue clomipramine, Klonopin, Lamictal, and Seroquel      Admission Orders:  TELE  11/7/2017 2015 INPATIENT   Scheduled Meds:   clomiPRAMINE 50 mg Oral TID   clonazePAM 0 5 mg Oral TID   HYDROcodone-acetaminophen 2 tablet Oral TID   lamoTRIgine 100 mg Oral TID   pantoprazole 40 mg Oral Early Morning   potassium chloride 20 mEq Oral TID With Meals   QUEtiapine 400 mg Oral HS   QUEtiapine 300 mg Oral HS     Continuous Infusions:   sodium chloride 0 9 % with KCl 20 mEq/L 100 mL/hr Last Rate: 100 mL/hr (11/07/17 0245)     PRN Meds: ondansetron - not used       OTHER ORDERS:  scds  Consult nephrology

## 2022-04-23 ENCOUNTER — HOSPITAL ENCOUNTER (EMERGENCY)
Facility: HOSPITAL | Age: 62
Discharge: HOME | End: 2022-04-23
Attending: EMERGENCY MEDICINE | Admitting: EMERGENCY MEDICINE
Payer: MEDICARE

## 2022-04-23 ENCOUNTER — APPOINTMENT (EMERGENCY)
Dept: RADIOLOGY | Facility: HOSPITAL | Age: 62
End: 2022-04-23
Payer: MEDICARE

## 2022-04-23 VITALS
HEIGHT: 67 IN | SYSTOLIC BLOOD PRESSURE: 142 MMHG | TEMPERATURE: 97.9 F | OXYGEN SATURATION: 99 % | BODY MASS INDEX: 35.63 KG/M2 | WEIGHT: 227 LBS | HEART RATE: 89 BPM | RESPIRATION RATE: 16 BRPM | DIASTOLIC BLOOD PRESSURE: 69 MMHG

## 2022-04-23 DIAGNOSIS — M25.531 ACUTE PAIN OF RIGHT WRIST: Primary | ICD-10-CM
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
## 2022-04-23 PROCEDURE — 73110 X-RAY EXAM OF WRIST: CPT

## 2022-04-23 PROCEDURE — 99284 EMERGENCY DEPT VISIT MOD MDM: CPT | Performed by: EMERGENCY MEDICINE

## 2022-04-23 PROCEDURE — 99283 EMERGENCY DEPT VISIT LOW MDM: CPT

## 2022-04-23 NOTE — ED PROVIDER NOTES
History  Chief Complaint   Patient presents with    Arm Pain     Pt presents with right wrist pain post trip and fall in march, is scheduled for surgery 22, pt reports increased pain  61-year-old female presents for evaluation of right wrist pain status post trip and fall  The patient has a history of injury to the right wrist with pins and screws and then subsequent tendon tear  The patient is actively followed by Hand surgery  She states that today she tripped over some towels when she was doing laundry and fell on an outstretched right wrist   The patient was not wearing her brace at that time  Arm Pain      Prior to Admission Medications   Prescriptions Last Dose Informant Patient Reported? Taking? AMILoride 5 mg tablet  Self No No   Sig: Take 1 tablet (5 mg total) by mouth 2 (two) times a day   HYDROcodone-acetaminophen (NORCO) 5-325 mg per tablet   No No   Sig: Take 1 tablet by mouth every 8 (eight) hours as needed (moderate-severe headache pain) for up to 10 doses Max Daily Amount: 3 tablets   Polyethylene Glycol 3350 (MIRALAX PO)  Self Yes No   Sig: Take by mouth as needed    QUEtiapine (SEROquel) 100 mg tablet  Self Yes No   Sig: Take 1 tablet by mouth daily after breakfast    QUEtiapine (SEROquel) 300 mg tablet  Self Yes No   Si at bedtime along with a 400 mg tab   QUEtiapine (SEROquel) 400 MG tablet  Self Yes No   Si at bedtime along with a 300 mg tab    acetaminophen (TYLENOL) 650 mg CR tablet   No No   Sig: Take 1 tablet (650 mg total) by mouth every 8 (eight) hours as needed for mild pain   albuterol (Ventolin HFA) 90 mcg/act inhaler  Self No No   Sig: Inhale 2 puffs every 6 (six) hours as needed for wheezing or shortness of breath   aspirin 81 mg chewable tablet  Self No No   Sig: Chew 1 tablet (81 mg total) daily   budesonide-formoterol (SYMBICORT) 160-4 5 mcg/act inhaler  Self No No   Sig: Inhale 2 puffs 2 (two) times a day Rinse mouth after use     Patient not taking: Reported on 2022    carvedilol (COREG) 25 mg tablet  Self No No   Sig: Take 1 tablet (25 mg total) by mouth 2 (two) times a day with meals   cholecalciferol (VITAMIN D3) 1,000 units tablet  Self No No   Sig: Take 5 tablets (5,000 Units total) by mouth daily   clonazePAM (KlonoPIN) 0 5 mg tablet   No No   Sig: Take 1 tablet (0 5 mg total) by mouth 3 (three) times a day   fluvoxaMINE (LUVOX) 100 mg tablet  Self Yes No   Si (two) times a day 1 tab am , 2 tabs HS   lamoTRIgine (LaMICtal) 200 MG tablet  Self Yes No   Sig: Take 200 mg by mouth 2 (two) times a day    linaCLOtide (Linzess) 290 MCG CAPS  Self No No   Sig: Take 1 capsule by mouth daily   omeprazole (PriLOSEC) 40 MG capsule   No No   Sig: Take 1 capsule (40 mg total) by mouth daily before breakfast   ondansetron (ZOFRAN) 4 mg tablet   No No   Sig: Take 1 tablet (4 mg total) by mouth every 8 (eight) hours as needed for nausea or vomiting   rosuvastatin (CRESTOR) 20 MG tablet   No No   Sig: Take 1 tablet (20 mg total) by mouth daily      Facility-Administered Medications: None       Past Medical History:   Diagnosis Date    Anxiety     Anxiety disorder     Bipolar 2 disorder (HCC)     Chronic back pain     Closed fracture of distal end of right fibula with routine healing 2020    COVID-19     in 2021    CVA (cerebral vascular accident) [facility])     noted on MRI in the past    Depression     GERD (gastroesophageal reflux disease)     Hypercholesteremia     Hypernatremia     Hypertension     Hypokalemia     Idiopathic chronic pancreatitis ([facility] [address] ) 3/20/2018    Intervertebral disc disorder with radiculopathy of lumbosacral region     resolved: 2015    Kidney disease     Panic attacks     Pericardial effusion     PONV (postoperative nausea and vomiting)     Radiculitis     resolved: 2015    Secondary renal hyperparathyroidism (HCC)     Vitamin D deficiency        Past Surgical History:   Procedure Laterality Date    BUNIONECTOMY      Left foot     COLON SURGERY      COLONOSCOPY  2021    DILATION AND CURETTAGE OF UTERUS      INDUCED       surgically induced    NH ANASTOMOSIS,AV,ANY SITE Left 2019    Procedure: CREATION FISTULA ARTERIOVENOUS (AV) left wrists possible left upper;  Surgeon: Enrike Fox MD;  [facility];  Service: Vascular    NH CORRJ HALLUX VALGUS W/SESMDC W/DIST [place] Life Left 2019    Procedure: Matthew Spangler;  Surgeon: William Mike DPM;  [facility];  Service: [address] W/SESMDC W/DIST [place] Life Right 8/3/2020    Procedure: Buck Sleek;  Surgeon: William Mike DPM;  [facility];  Service: Podiatry    NH [place] W/SESMDC Maynor Ren PHLNX OSTEOT Right 2021    Procedure: Nydia Deee, right tameka osteotomy and 2nd claw toe correction;  Surgeon: Ivelisse Tobias MD;  [facility];  Service: Orthopedics    NH ERCP DX COLLECTION SPECIMEN BRUSHING/WASHING N/A 2018    Procedure: ENDOSCOPIC RETROGRADE CHOLANGIOPANCREATOGRAPHY (ERCP);   Surgeon: Mario Ceja MD;  [facility];  Service: Gastroenterology    NH LAP, SURG PROCTOPEXY N/A 2018    Procedure: ROBOTIC SIGMOID RESECTION / RECTOPEXY;  Surgeon: Chantal Arzate MD;  [facility];  Service: Colorectal    NH OPEN TREATMENT RADIAL SHAFT FRACTURE Right 10/11/2021    Procedure: OPEN REDUCTION W/ INTERNAL FIXATION (ORIF) RADIUS (WRIST), RIGHT DISTAL;  Surgeon: Deyainra Castro MD;  [facility];  Service: Orthopedics    NH SIGMOIDOSCOPY FLX DX W/COLLJ SPEC BR/WA IF PFRMD N/A 2018    Procedure: Elda Alfredo;  Surgeon: Chantal Arzate MD;  [facility];  Service: Colorectal    TUBAL LIGATION Bilateral [facility] THYROID BIOPSY  2019       Family History   Problem Relation Age of Onset    Bipolar disorder Mother     Mental illness Mother         depression    Stroke Mother     Dementia Mother     Colon polyps Mother     Heart disease Father     Hypertension Father     Diabetes Father     Other Family         Back disorder    Diabetes Family     Heart disease Family     Hypertension Family     Stroke Family     Thyroid disease Family     Breast cancer Paternal [de-identified]         age unknown   Armand Pila Breast cancer Paternal [de-identified]         age unknown   Armand Pila Breast cancer Maternal Aunt         age unknown    Mental illness Sister     Colon polyps Sister     Mental illness Sister     Heart disease Sister     No Known Problems Sister     Breast cancer Sister 76    Other Son         pituitary tumor    Hypertension Son     Obesity Son     No Known Problems Son     No Known Problems Maternal Grandmother     No Known Problems Maternal Grandfather     No Known Problems Paternal Grandfather     Breast cancer Paternal Aunt         age unknown    Substance Abuse Neg Hx         neg fam hx    Colon cancer Neg Hx      I have reviewed and agree with the history as documented  E-Cigarette/Vaping    E-Cigarette Use Never User      E-Cigarette/Vaping Substances    Nicotine No     THC No     CBD No     Flavoring No     Other No     Unknown No      Social History     Tobacco Use    Smoking status: Never Smoker    Smokeless tobacco: Never Used   Vaping Use    Vaping Use: Never used   Substance Use Topics    Alcohol use: Never    Drug use: Not Currently       Review of Systems   Musculoskeletal: Positive for arthralgias and back pain  Neurological: Negative for weakness and numbness  All other systems reviewed and are negative  Physical Exam  Physical Exam  Vitals and nursing note reviewed  Constitutional:       General: She is not in acute distress  Appearance: She is well-developed  HENT:      Head: Normocephalic and atraumatic        Right Ear: External ear normal       Left Ear: External ear normal    Eyes:      General: No scleral icterus  Pulmonary:      Effort: Pulmonary effort is normal  No respiratory distress  Abdominal:      General: There is no distension  Musculoskeletal:         General: Normal range of motion  Cervical back: Normal range of motion  Skin:     General: Skin is warm and dry  Findings: No rash  Neurological:      Mental Status: She is alert  Mental status is at baseline  Psychiatric:         Mood and Affect: Mood normal          Vital Signs  ED Triage Vitals [04/23/22 1337]   Temperature Pulse Respirations Blood Pressure SpO2   97 9 °F (36 6 °C) 89 16 142/69 99 %      Temp Source Heart Rate Source Patient Position - Orthostatic VS BP Location FiO2 (%)   Temporal Monitor Sitting Right arm --      Pain Score       7           Vitals:    04/23/22 1337   BP: 142/69   Pulse: 89   Patient Position - Orthostatic VS: Sitting         Visual Acuity      ED Medications  Medications - No data to display    Diagnostic Studies  Results Reviewed     None                 XR wrist 3+ views RIGHT   ED Interpretation by Gabrielle Moran DO (04/23 1402)   No acute osseous abnormality  Hardware appears intact                 Procedures  Procedures         ED Course                               SBIRT 20yo+      Most Recent Value   SBIRT (22 yo +)    In order to provide better care to our patients, we are screening all of our patients for alcohol and drug use  Would it be okay to ask you these screening questions? Yes Filed at: 04/23/2022 1345   Initial Alcohol Screen: US AUDIT-C     1  How often do you have a drink containing alcohol? 0 Filed at: 04/23/2022 1345   2  How many drinks containing alcohol do you have on a typical day you are drinking? 0 Filed at: 04/23/2022 1345   3a  Male UNDER 65: How often do you have five or more drinks on one occasion? 0 Filed at: 04/23/2022 1345   3b  FEMALE Any Age, or MALE 65+: How often do you have 4 or more drinks on one occassion?  0 Filed at: 04/23/2022 1345   Audit-C Score 0 Filed at: 04/23/2022 4440   ZAYRA: How many times in the past year have you    Used an illegal drug or used a prescription medication for non-medical reasons? Never Filed at: 04/23/2022 1345                    [facility]  Number of Diagnoses or Management Options  Acute pain of right wrist  Fall from slip, trip, or stumble, initial encounter  Diagnosis management comments:  Plan is to obtain x-rays to rule out fracture / dislocation or disruption of hardware     patient advised to follow-up with hand surgery as previously scheduled  Amount and/or Complexity of Data Reviewed  Review and summarize past medical records: yes        Disposition  Final diagnoses:   Acute pain of right wrist   Fall from slip, trip, or stumble, initial encounter     Time reflects when diagnosis was documented in both MDM as applicable and the Disposition within this note     Time User Action Codes Description Comment    4/23/2022  2:02 PM Chavez Moreau [M25 531] Acute pain of right wrist     4/23/2022  2:03 PM Chavez Moreau [W01  0XXA] Fall from slip, trip, or stumble, initial encounter       ED Disposition     ED Disposition Condition Date/Time Comment    Discharge Stable Sat Apr 23, 2022  2:02 PM Jazzy discharge to home/self care              Follow-up Information    None         Discharge Medication List as of 4/23/2022  2:03 PM      CONTINUE these medications which have NOT CHANGED    Details   acetaminophen (TYLENOL) 650 mg CR tablet Take 1 tablet (650 mg total) by mouth every 8 (eight) hours as needed for mild pain, Starting Mon 4/4/2022, Normal      albuterol (Ventolin HFA) 90 mcg/act inhaler Inhale 2 puffs every 6 (six) hours as needed for wheezing or shortness of breath, Starting Mon 5/3/2021, Normal      AMILoride 5 mg tablet Take 1 tablet (5 mg total) by mouth 2 (two) times a day, Starting Mon 1/3/2022, Normal      aspirin 81 mg chewable tablet Chew 1 tablet (81 mg total) daily, Starting Tue 12/28/2021, No Print      budesonide-formoterol (SYMBICORT) 160-4 5 mcg/act inhaler Inhale 2 puffs 2 (two) times a day Rinse mouth after use , Starting Mon 1/3/2022, Print      carvedilol (COREG) 25 mg tablet Take 1 tablet (25 mg total) by mouth 2 (two) times a day with meals, Starting Mon 2/14/2022, Normal      cholecalciferol (VITAMIN D3) 1,000 units tablet Take 5 tablets (5,000 Units total) by mouth daily, Starting Mon 1/31/2022, Normal      clonazePAM (KlonoPIN) 0 5 mg tablet Take 1 tablet (0 5 mg total) by mouth 3 (three) times a day, Starting Thu 4/7/2022, Normal      fluvoxaMINE (LUVOX) 100 mg tablet 2 (two) times a day 1 tab am , 2 tabs HS, Starting Wed 10/10/2018, Historical Med      HYDROcodone-acetaminophen (NORCO) 5-325 mg per tablet Take 1 tablet by mouth every 8 (eight) hours as needed (moderate-severe headache pain) for up to 10 doses Max Daily Amount: 3 tablets, Starting Mon 4/18/2022, Normal      lamoTRIgine (LaMICtal) 200 MG tablet Take 200 mg by mouth 2 (two) times a day , Starting Mon 4/13/2020, Historical Med      linaCLOtide (Linzess) 290 MCG CAPS Take 1 capsule by mouth daily, Starting Fri 10/15/2021, Until Mon 4/4/2022, Print      omeprazole (PriLOSEC) 40 MG capsule Take 1 capsule (40 mg total) by mouth daily before breakfast, Starting Tue 3/22/2022, Normal      ondansetron (ZOFRAN) 4 mg tablet Take 1 tablet (4 mg total) by mouth every 8 (eight) hours as needed for nausea or vomiting, Starting Mon 3/28/2022, Normal      Polyethylene Glycol 3350 (MIRALAX PO) Take by mouth as needed , Historical Med      !! QUEtiapine (SEROquel) 100 mg tablet Take 1 tablet by mouth daily after breakfast , Historical Med      !! QUEtiapine (SEROquel) 300 mg tablet 1 at bedtime along with a 400 mg tab, Historical Med      !! QUEtiapine (SEROquel) 400 MG tablet 1 at bedtime along with a 300 mg tab , Historical Med      rosuvastatin (CRESTOR) 20 MG tablet Take 1 tablet (20 mg total) by mouth daily, Starting Tue 4/5/2022, Normal       !! - Potential duplicate medications found  Please discuss with provider  No discharge procedures on file      PDMP Review       Value Time User    PDMP Reviewed  Yes 4/18/2022  5:45 PM Rhonda Hutchinson DO          ED Provider  Electronically Signed by           Tracy Gong DO  04/23/22 9714 T(C): 36.7 (03-04-20 @ 10:18), Max: 36.7 (03-04-20 @ 10:18)  HR: 185 (03-04-20 @ 10:18) (185 - 185)  BP: --  RR: 60 (03-04-20 @ 10:18) (60 - 60)  SpO2: 95% (03-04-20 @ 10:18) (95% - 95%)    PHYSICAL EXAM:    Constitutional: WDWN toddler, fussy, in bed, in distress/crying  Head: NC/AT  Eyes: PERRL, EOMI  ENT: +clear rhinorrhea; MMM  Neck: supple  Respiratory: +wheezing and crackles b/l, +slight retractions, +belly breathing  Cardiovascular: +S1/S2; RRR; no M/R/G  Gastrointestinal: soft, NT/ND abdomen  Extremities: WWP, no clubbing or cyanosis  Musculoskeletal: NROM x4  Vascular: 2+ radial, femoral, DP/PT pulses B/L  Dermatologic: skin warm, dry and intact

## 2022-04-25 ENCOUNTER — TELEPHONE (OUTPATIENT)
Dept: OBGYN CLINIC | Facility: HOSPITAL | Age: 62
End: 2022-04-25

## 2022-04-27 NOTE — TELEPHONE ENCOUNTER
A force on request was sent  Thank you
Patient reports rt wrist, pain level 7 is not being helped by Tylenol, asking for something else, [address] on file  Thanks 
Patient returned a call from our office  I was unable to schedule this patient as advised below        Please call Klarissa Madrid at [phone]
This is a new injury  She needs to come in for an appointment 
full weight-bearing

## 2022-04-28 ENCOUNTER — TELEPHONE (OUTPATIENT)
Dept: GASTROENTEROLOGY | Facility: CLINIC | Age: 62
End: 2022-04-28

## 2022-04-28 NOTE — TELEPHONE ENCOUNTER
Pt called questioning if her med Sruthi Ringer was rec'd yet? Noted new ph # [phone]  Called Pt  She did not rec earlier mssg  Advised med is here  She is having difficulty retrieving mssgs from new phone/will come  med tomorrow

## 2022-04-28 NOTE — TELEPHONE ENCOUNTER
Message left for patient that Berkley Butler Woo received from Patient Assistance and she can come  at our office

## 2022-04-29 ENCOUNTER — OFFICE VISIT (OUTPATIENT)
Dept: OBGYN CLINIC | Facility: HOSPITAL | Age: 62
End: 2022-04-29
Payer: MEDICARE

## 2022-04-29 ENCOUNTER — TELEPHONE (OUTPATIENT)
Dept: FAMILY MEDICINE CLINIC | Facility: HOSPITAL | Age: 62
End: 2022-04-29

## 2022-04-29 VITALS
BODY MASS INDEX: 35.79 KG/M2 | SYSTOLIC BLOOD PRESSURE: 155 MMHG | HEART RATE: 103 BPM | HEIGHT: 67 IN | DIASTOLIC BLOOD PRESSURE: 83 MMHG | WEIGHT: 228 LBS

## 2022-04-29 DIAGNOSIS — M79.601 PAIN OF RIGHT UPPER EXTREMITY: Primary | ICD-10-CM

## 2022-04-29 DIAGNOSIS — M77.11 LATERAL EPICONDYLITIS OF RIGHT ELBOW: Primary | ICD-10-CM

## 2022-04-29 PROCEDURE — 99214 OFFICE O/P EST MOD 30 MIN: CPT | Performed by: ORTHOPAEDIC SURGERY

## 2022-04-29 RX ORDER — TRAMADOL HYDROCHLORIDE 50 MG/1
50 TABLET ORAL EVERY 8 HOURS PRN
Qty: 30 TABLET | Refills: 0 | Status: SHIPPED | OUTPATIENT
Start: 2022-04-29 | End: 2022-05-11 | Stop reason: SDUPTHER

## 2022-04-29 RX ORDER — METHYLPREDNISOLONE 4 MG/1
TABLET ORAL
Qty: 21 TABLET | Refills: 0 | Status: ON HOLD | OUTPATIENT
Start: 2022-04-29 | End: 2022-05-27 | Stop reason: ALTCHOICE

## 2022-04-29 NOTE — TELEPHONE ENCOUNTER
I ordered tramadol for this patient Q 8 hours p r n -plan is for her to have surgery for removal of a plate in her arm as per note from   [facility] told

## 2022-04-29 NOTE — TELEPHONE ENCOUNTER
Patient seen by dr Wing Serna this morning  He advised her to call pcp for sleeping medication - having pain at night keeping her up

## 2022-04-29 NOTE — PROGRESS NOTES
ASSESSMENT/PLAN:    Assessment:   Flexor and extensor tendon inflammation  - flexor tendon is secondary to the plate  Lateral epicondylitis  [place]ervain tenosynovitis  Radial tunnel syndrome    Plan:   Reviewed with patient we do not prescribe pain medications for sleeping  Reviewed with patient that the level of Tylenol she is taking is 3x the amount she should be taking  Reviewed with patient she can take Tylenol 325 mg 2 tablets p o  q 6 hours  Review with patient she can take Motrin 200 mg 4 tablets p o  q 8 hours  Will prescribe a Medrol Dosepak for acute inflammation control    Discussed with patient that at this time she can have her surgery for the plate removal moved up to 5/12/22  Discussed that she will better be able to rehab once the plate was removed as it is causing increased inflammation in her arm  Follow Up: After Surgery    To Do Next Visit:  Sutures out and Begin therapy      _____________________________________________________  CHIEF COMPLAINT:  Chief Complaint   Patient presents with    Right Wrist - Pain     XR 4/23/22         SUBJECTIVE:  Johan Mccartney is a 63 y o  female who presents for follow up regarding the right upper extremity pain  Patient has had severe pain since she fell about a week ago  She has been taking Tylenol 325 mg tablets 4 tablets p o  q 4 hours for the last week  Patient states that she cannot sleep at night  She is having significant pain that goes all the way up to her elbow    She denies any other acute complaints    PAST MEDICAL HISTORY:  Past Medical History:   Diagnosis Date    Anxiety     Anxiety disorder     Bipolar 2 disorder ([facility] [address] )     Chronic back pain     Closed fracture of distal end of right fibula with routine healing 11/4/2020    COVID-19     in Jan 2021    CVA ([facility]re[facility] accident[facility])     noted on MRI in the past    Depression     GERD (gastroesophageal reflux disease)     Hypercholesteremia     Hypernatremia     Hypertension     Hypokalemia     Idiopathic chronic pancreatitis ([facility] [address] ) 3/20/2018    Intervertebral disc disorder with radiculopathy of lumbosacral region     resolved: 2015    Kidney disease     Panic attacks     Pericardial effusion     PONV (postoperative nausea and vomiting)     Radiculitis     resolved: 2015    Secondary renal hyperparathyroidism ([facility] [address] )     Vitamin D deficiency        PAST SURGICAL HISTORY:  Past Surgical History:   Procedure Laterality Date    BUNIONECTOMY      Left foot     COLON SURGERY      COLONOSCOPY  2021    DILATION AND CURETTAGE OF UTERUS      INDUCED       surgically induced    TX ANASTOMOSIS,AV,ANY SITE Left 2019    Procedure: CREATION FISTULA ARTERIOVENOUS (AV) left wrists possible left upper;  Surgeon: Lionel Kumar MD;  [facility];  Service: Vascular    TX [place] W/SESMDC W/DIST Daylene Ax Left 2019    Procedure: Jerre Garner;  Surgeon: Bryce Henry DPM;  [facility];  Service: [address] [place] [address] W/SESMDC W/DIST Daylene Ax Right 8/3/2020    Procedure: Evan Rump;  Surgeon: Bryce Henry DPM;  [facility];  Service: Podiatry    [place] W/SESMDC Kalpesh Richardson PHLNX OSTEOT Right 2021    Procedure: Narcisa Chime, right tameka osteotomy and 2nd claw toe correction;  Surgeon: Meg Malcolm MD;  [facility];  Service: Orthopedics    TX ERCP DX COLLECTION SPECIMEN BRUSHING/WASHING N/A 2018    Procedure: ENDOSCOPIC RETROGRADE CHOLANGIOPANCREATOGRAPHY (ERCP);   Surgeon: Maria Eugenia Michael MD;  [facility];  Service: Gastroenterology    TX LAP, SURG PROCTOPEXY N/A 2018    Procedure: ROBOTIC SIGMOID RESECTION / RECTOPEXY;  Surgeon: Bhavya Chen MD;  [facility];  Service: Colorectal    TX OPEN TREATMENT RADIAL SHAFT FRACTURE Right 10/11/2021    Procedure: OPEN REDUCTION W/ INTERNAL FIXATION (ORIF) RADIUS (WRIST), RIGHT DISTAL;  Surgeon: Nathalie Lucas MD;  [facility];  Service: Orthopedics    MS SIGMOIDOSCOPY FLX DX W/COLLJ SPEC BR/WA IF PFRMD N/A 7/13/2018    Procedure: Nery Melendrez;  Surgeon: Shelbie De Souza MD;  [facility];  Service: Colorectal    TUBAL LIGATION Bilateral 1997    US GUIDED THYROID BIOPSY  7/30/2019       FAMILY HISTORY:  Family History   Problem Relation Age of Onset    Bipolar disorder Mother     Mental illness Mother         depression    Stroke Mother     Dementia Mother     Colon polyps Mother     Heart disease Father     Hypertension Father     Diabetes Father     Other Family         Back disorder    Diabetes Family     Heart disease Family     Hypertension Family     Stroke Family     Thyroid disease Family     Breast cancer Paternal Grandmother         age unknown   Julieann Frankel Breast cancer Paternal [de-identified]         age unknown   Julieann Frankel Breast cancer Maternal Aunt         age unknown    Mental illness Sister     Colon polyps Sister     Mental illness Sister     Heart disease Sister     No Known Problems Sister     Breast cancer Sister 76    Other Son         pituitary tumor    Hypertension Son     Obesity Son     No Known Problems Son     No Known Problems Maternal Grandmother     No Known Problems Maternal Grandfather     No Known Problems Paternal Grandfather     Breast cancer Paternal Aunt         age unknown    Substance Abuse Neg Hx         neg fam hx    Colon cancer Neg Hx        SOCIAL HISTORY:  Social History     Tobacco Use    Smoking status: Never Smoker    Smokeless tobacco: Never Used   Vaping Use    Vaping Use: Never used   Substance Use Topics    Alcohol use: Never    Drug use: Not Currently       MEDICATIONS:    Current Outpatient Medications:     acetaminophen (TYLENOL) 650 mg CR tablet, Take 1 tablet (650 mg total) by mouth every 8 (eight) hours as needed for mild pain, Disp: 30 tablet, Rfl: 0    albuterol (Ventolin HFA) 90 mcg/act inhaler, Inhale 2 puffs every 6 (six) hours as needed for wheezing or shortness of breath, Disp: 8 5 g, Rfl: 3    AMILoride 5 mg tablet, Take 1 tablet (5 mg total) by mouth 2 (two) times a day, Disp: 180 tablet, Rfl: 3    aspirin 81 mg chewable tablet, Chew 1 tablet (81 mg total) daily, Disp: , Rfl:     budesonide-formoterol (SYMBICORT) 160-4 5 mcg/act inhaler, Inhale 2 puffs 2 (two) times a day Rinse mouth after use , Disp: 30 6 g, Rfl: 3    carvedilol (COREG) 25 mg tablet, Take 1 tablet (25 mg total) by mouth 2 (two) times a day with meals, Disp: 180 tablet, Rfl: 3    cholecalciferol (VITAMIN D3) 1,000 units tablet, Take 5 tablets (5,000 Units total) by mouth daily, Disp: 90 tablet, Rfl: 5    clonazePAM (KlonoPIN) 0 5 mg tablet, Take 1 tablet (0 5 mg total) by mouth 3 (three) times a day, Disp: 270 tablet, Rfl: 0    fluvoxaMINE (LUVOX) 100 mg tablet, 2 (two) times a day 1 tab am , 2 tabs HS, Disp: , Rfl:     HYDROcodone-acetaminophen (NORCO) 5-325 mg per tablet, Take 1 tablet by mouth every 8 (eight) hours as needed (moderate-severe headache pain) for up to 10 doses Max Daily Amount: 3 tablets, Disp: 10 tablet, Rfl: 0    lamoTRIgine (LaMICtal) 200 MG tablet, Take 200 mg by mouth 2 (two) times a day , Disp: , Rfl:     linaCLOtide (Linzess) 290 MCG CAPS, Take 1 capsule by mouth daily, Disp: 90 capsule, Rfl: 0    omeprazole (PriLOSEC) 40 MG capsule, Take 1 capsule (40 mg total) by mouth daily before breakfast, Disp: 90 capsule, Rfl: 3    ondansetron (ZOFRAN) 4 mg tablet, Take 1 tablet (4 mg total) by mouth every 8 (eight) hours as needed for nausea or vomiting, Disp: 20 tablet, Rfl: 1    Polyethylene Glycol 3350 (MIRALAX PO), Take by mouth as needed , Disp: , Rfl:     QUEtiapine (SEROquel) 100 mg tablet, Take 1 tablet by mouth daily after breakfast , Disp: , Rfl:     QUEtiapine (SEROquel) 300 mg tablet, 1 at bedtime along with a 400 mg tab, Disp: , Rfl:     QUEtiapine (SEROquel) 400 MG tablet, 1 at bedtime along with a 300 mg tab , Disp: , Rfl:     rosuvastatin (CRESTOR) 20 MG tablet, Take 1 tablet (20 mg total) by mouth daily, Disp: 90 tablet, Rfl: 1    ALLERGIES:  Allergies   Allergen Reactions    Pollen Extract Nasal Congestion       REVIEW OF SYSTEMS:  Pertinent items are noted in HPI  A comprehensive review of systems was negative  LABS:  HgA1c:   Lab Results   Component Value Date    HGBA1C 5 4 03/24/2020     BMP:   Lab Results   Component Value Date    CALCIUM 9 1 03/04/2022     02/27/2017    K 4 6 03/04/2022    CO2 26 03/04/2022     03/04/2022    BUN 40 (H) 03/04/2022    CREATININE 2 64 (H) 03/04/2022           _____________________________________________________  PHYSICAL EXAMINATION:  Vital signs: /83   Pulse 103   Ht 5' 7" (1 702 m)   Wt 103 kg (228 lb)   LMP  (LMP Unknown)   BMI 35 71 kg/m²   General: well developed and well nourished, alert, oriented times 3 and appears comfortable  Psychiatric: Normal  HEENT: Trachea Midline, No torticollis  Cardiovascular: No discernable arrhythmia  Pulmonary: No wheezing or stridor  Abdomen: No rebound or guarding  Extremities: No peripheral edema  Skin: No masses, erythema, lacerations, fluctation, ulcerations  Neurovascular: Sensation Intact to the Median, Ulnar, Radial Nerve, Motor Intact to the Median, Ulnar, Radial Nerve and Pulses Intact    MUSCULOSKELETAL EXAMINATION:  RIGHT SIDE:  Upper Extremity   Tenderness over the lateral epicondyle, radial tunnel, radial styloid  Flexor tendons at the level of the wrist     Patient will not make a fist secondary to pain    Patient has pain with resisted wrist flexion extension    _____________________________________________________  STUDIES REVIEWED:  Images were reviewed in PACS by Dr Jim Wilcox and demonstrate: no acute findings on the xray images      PROCEDURES PERFORMED:  Procedures  No Procedures performed today    Scribe Attestation    I,:  Esvin Liang Maciel Osborne PA-C am acting as a scribe while in the presence of the attending physician :       I,:  Honey Kent MD personally performed the services described in this documentation    as scribed in my presence :

## 2022-04-29 NOTE — PROGRESS NOTES
Telephone Call Documentation    Johan Mccartney       1960            I patient had fall and injured her right arm-seen by Dr  Metoprolol today-complaining of ongoing pain will send in for tramadol q 8 hours p r n   And plans are to eventually remove the plate in her arm

## 2022-04-29 NOTE — PATIENT INSTRUCTIONS
Tylenol 325 mg  You can take 2 tablets PO every 6 hours OR you can take 4 tablets every 12 hours  Motrin (ibuprofen) 200 mg   You can take 4 tablets PO every 8 hours

## 2022-05-04 NOTE — PROGRESS NOTES
Jilda Nageotte, the resident with white surgery made aware of pt's respirs of 8/minute when she falls asleep  This was the reason the PCA dose was lowered at 0145  Her pain level is controlled at 5/10  Asked him for a prn dose of narcan in case she sustains at 8/hr   Will continue to monitor 1

## 2022-05-09 ENCOUNTER — TELEPHONE (OUTPATIENT)
Dept: FAMILY MEDICINE CLINIC | Facility: HOSPITAL | Age: 62
End: 2022-05-09

## 2022-05-09 NOTE — TELEPHONE ENCOUNTER
Patient called back - left message on [employer] @ 822p  Wanting to know if she needs a clearance from us?

## 2022-05-09 NOTE — TELEPHONE ENCOUNTER
Pt prescribed methylprednisolone therapy pack and took last dose this AM  States she just started with a sore throat and read that this could be a side effect   PCB

## 2022-05-11 ENCOUNTER — TELEPHONE (OUTPATIENT)
Dept: OBGYN CLINIC | Facility: CLINIC | Age: 62
End: 2022-05-11

## 2022-05-11 DIAGNOSIS — M79.601 PAIN OF RIGHT UPPER EXTREMITY: ICD-10-CM

## 2022-05-11 DIAGNOSIS — M20.11 ACQUIRED HALLUX INTERPHALANGEUS, RIGHT: ICD-10-CM

## 2022-05-11 DIAGNOSIS — S99.921A INJURY OF PLANTAR PLATE, RIGHT, INITIAL ENCOUNTER: ICD-10-CM

## 2022-05-11 DIAGNOSIS — M20.5X1 CLAW TOE, ACQUIRED, RIGHT: ICD-10-CM

## 2022-05-11 RX ORDER — ONDANSETRON 4 MG/1
4 TABLET, FILM COATED ORAL EVERY 8 HOURS PRN
Qty: 20 TABLET | Refills: 1 | Status: SHIPPED | OUTPATIENT
Start: 2022-05-11 | End: 2022-06-21 | Stop reason: SDUPTHER

## 2022-05-13 ENCOUNTER — TELEPHONE (OUTPATIENT)
Dept: FAMILY MEDICINE CLINIC | Facility: HOSPITAL | Age: 62
End: 2022-05-13

## 2022-05-13 RX ORDER — TRAMADOL HYDROCHLORIDE 50 MG/1
50 TABLET ORAL EVERY 8 HOURS PRN
Qty: 30 TABLET | Refills: 0 | Status: ON HOLD | OUTPATIENT
Start: 2022-05-13 | End: 2022-05-27 | Stop reason: SDUPTHER

## 2022-05-16 ENCOUNTER — TELEPHONE (OUTPATIENT)
Dept: OBGYN CLINIC | Facility: HOSPITAL | Age: 62
End: 2022-05-16

## 2022-05-16 NOTE — TELEPHONE ENCOUNTER
Left message for patient to come in @ 3 pm to see Gita Ibrahim for at cast change in our [place] office

## 2022-05-16 NOTE — TELEPHONE ENCOUNTER
DR Hermes Rogers  RE: Cast splitting and having pain  CB: [phone]    Patient called stating that her cast is splitting by her thumb at the top and she is experiencing a lot of pain   Caller asked to speak to clinical team

## 2022-05-17 ENCOUNTER — TELEPHONE (OUTPATIENT)
Dept: OBGYN CLINIC | Facility: HOSPITAL | Age: 62
End: 2022-05-17

## 2022-05-17 ENCOUNTER — OFFICE VISIT (OUTPATIENT)
Dept: OBGYN CLINIC | Facility: CLINIC | Age: 62
End: 2022-05-17
Payer: MEDICARE

## 2022-05-17 DIAGNOSIS — Z47.89 ENCOUNTER FOR CAST CHANGE: Primary | ICD-10-CM

## 2022-05-17 DIAGNOSIS — S52.531D CLOSED COLLES' FRACTURE OF RIGHT RADIUS WITH ROUTINE HEALING, SUBSEQUENT ENCOUNTER: ICD-10-CM

## 2022-05-17 PROCEDURE — 29125 APPL SHORT ARM SPLINT STATIC: CPT | Performed by: PHYSICIAN ASSISTANT

## 2022-05-17 PROCEDURE — 99211 OFF/OP EST MAY X REQ PHY/QHP: CPT | Performed by: PHYSICIAN ASSISTANT

## 2022-05-17 NOTE — TELEPHONE ENCOUNTER
Can you please call the [facility] office to see if Brain can see this patient today  I asked her to come in yesterday because I was in the office and would be able to do the cast change but I'm not there today  Patient also has to stop and see Bill Flies the surgery schedule while she is there   Thank you

## 2022-05-17 NOTE — PROGRESS NOTES
Orthopaedic Surgery - Office Note  Gege Hodgson (89 y o  female)   : 1960   MRN: [id number]  Encounter Date: 2022    No chief complaint on file  Cast change      Assessment/Plan  Diagnoses and all orders for this visit:    Encounter for cast change    Closed Colles' fracture of right radius with routine healing, subsequent encounter    Other orders  -     Cast application    Cast care instructions were again reviewed with the patient  Patient will follow-up with surgery scheduling as requested by the hand team     Return With treating hand surgeon as scheduled  Adela Ruiz History of Present Illness  Patient called in yesterday requesting a cast change due to some irritation at her thumb by report  Patient was post to come in yesterday but did not present as recommended  She presents today for a requested cast change  She reports she would also like the cast changed as she took a shower and got the cast wet  She is in a thumb spica splint    Review of Systems  Pertinent items are noted in HPI  All other systems were reviewed and are negative  Physical Exam  LMP  (LMP Unknown)   Cons: Appears well  No apparent distress  Psych: Alert  Oriented x3  Mood and affect normal   Eyes: PERRLA, EOMI  Resp: Normal effort  No audible wheezing or stridor  CV: Palpable pulse  No discernable arrhythmia  Lymph:  No palpable cervical, axillary, or inguinal lymphadenopathy  Skin: Warm  No palpable masses  No visible lesions  Neuro: Normal muscle tone  Normal and symmetric DTR's  Patient is in an appropriately constructed and well-fitting thumb spica that has gotten wet and will be removed and changed today  Skin is without signs of infection  No open wounds or lesions    Neurovascularly intact        Studies Reviewed      Cast application    Date/Time: 2022 2:00 PM  Performed by: Pamela Rodriguez  Authorized by: Joceline Landeros PA-C   Universal Protocol:  Consent: Verbal consent obtained  Risks and benefits: risks, benefits and alternatives were discussed  Consent given by: patient  Time out: Immediately prior to procedure a "time out" was called to verify the correct patient, procedure, equipment, support staff and site/side marked as required  Patient understanding: patient states understanding of the procedure being performed  Patient consent: the patient's understanding of the procedure matches consent given  Relevant documents: relevant documents present and verified  Test results: test results available and properly labeled  Site marked: the operative site was marked  Radiology Images displayed and confirmed  If images not available, report reviewed: imaging studies available      Pre-procedure details:     Sensation:  Normal  Procedure details:     Laterality:  Right    Location:  Wrist    Wrist:  R wrist    Splint type:  Thumb spica  Post-procedure details:     Pain:  Unchanged    Sensation:  Normal    Patient tolerance of procedure: Tolerated well, no immediate complications      Medical, Surgical, Family, and Social History  The patient's medical history, family history, and social history, were reviewed and updated as appropriate      Past Medical History:   Diagnosis Date    Anxiety     Anxiety disorder     [facility]     Chronic back pain     Chronic kidney disease     Closed fracture of distal end of right fibula with routine healing 11/4/2020    COVID-19     in Jan 2021    CVA [facility])     noted on MRI in the past    Depression     GERD (gastroesophageal reflux disease)     Hypercholesteremia     Hypernatremia     Hypertension     Hypokalemia     Idiopathic chronic pancreatitis ([facility] ) 3/20/2018    Intervertebral disc disorder with radiculopathy of lumbosacral region     resolved: 12/28/2015    Kidney disease     Panic attacks     Pericardial effusion     PONV (postoperative nausea and vomiting)     Radiculitis resolved: 2015    Secondary renal hyperparathyroidism ([facility] [address] )     Vitamin D deficiency        Past Surgical History:   Procedure Laterality Date    BUNIONECTOMY      Left foot     COLON SURGERY      COLONOSCOPY  2021    DILATION AND CURETTAGE OF UTERUS      INDUCED       surgically induced    PA ANASTOMOSIS,AV,ANY SITE Left 2019    Procedure: CREATION FISTULA ARTERIOVENOUS (AV) left wrists possible left upper;  Surgeon: Von Bermudez MD;  [facility];  Service: Vascular    PA CORRJ HALLUX VALGUS W/SESMDC W/DIST Heide Kanaris Left 2019    Procedure: Conception Fears;  Surgeon: John Cotto DPM;  [facility];  Service: [address] W/SESMDC W/DIST Heide Kanaris Right 8/3/2020    Procedure: Jose Johnson;  Surgeon: John Cotto DPM;  [facility];  Service: Podiatry    PA [place] W/SESMDC Neldon Cardoso PHLNX OSTEOT Right 2021    Procedure: Donn Organ, right tameka osteotomy and 2nd claw toe correction;  Surgeon: Alvin Obando MD;  [facility];  Service: Orthopedics    PA ERCP DX COLLECTION SPECIMEN BRUSHING/WASHING N/A 2018    Procedure: ENDOSCOPIC RETROGRADE CHOLANGIOPANCREATOGRAPHY (ERCP);   Surgeon: Charlotte Ko MD;  [facility];  Service: Gastroenterology    PA LAP, SURG PROCTOPEXY N/A 2018    Procedure: ROBOTIC SIGMOID RESECTION / RECTOPEXY;  Surgeon: Vicente Hopkins MD;  [facility];  Service: Colorectal    PA OPEN TREATMENT RADIAL SHAFT FRACTURE Right 10/11/2021    Procedure: OPEN REDUCTION W/ INTERNAL FIXATION (ORIF) RADIUS (WRIST), RIGHT DISTAL;  Surgeon: Ron Carlson MD;  [facility];  Service: Orthopedics    PA SIGMOIDOSCOPY FLX DX W/COLLJ SPEC BR/WA IF PFRMD N/A 2018    Procedure: Jonnathan Backbone;  Surgeon: Vicente Hopkins MD;  [facility];  Service: Colorectal    TUBAL LIGATION Bilateral    [place] [id number] THYROID BIOPSY  7/30/2019       Family History   Problem Relation Age of Onset    Bipolar disorder Mother     Mental illness Mother         depression    Stroke Mother     Dementia Mother     Colon polyps Mother     Heart disease Father     Hypertension Father     Diabetes Father     Other Family         Back disorder    Diabetes Family     Heart disease Family     Hypertension Family     Stroke Family     Thyroid disease Family     Breast cancer Paternal [address]         age unknown   [facility] Breast cancer Paternal [de-identified]         age unknown   [facility] Breast cancer Maternal Aunt         age unknown    Mental illness Sister     Colon polyps Sister     Mental illness Sister     Heart disease Sister     No Known Problems Sister     Breast cancer Sister 76    Other Son         pituitary tumor    Hypertension Son     Obesity Son     No Known Problems Son     No Known Problems Maternal Grandmother     No Known Problems Maternal Grandfather     No Known Problems Paternal Grandfather     Breast cancer Paternal Aunt         age unknown    Substance Abuse Neg Hx         neg fam hx    Colon cancer Neg Hx        Social History     Occupational History    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   Tobacco Use    Smoking status: Never Smoker    Smokeless tobacco: Never Used   Vaping Use    Vaping Use: Never used   Substance and Sexual Activity    Alcohol use: Never    Drug use: Not Currently    Sexual activity: Not Currently       Allergies   Allergen Reactions    Pollen Extract Nasal Congestion         Current Outpatient Medications:     acetaminophen (TYLENOL) 650 mg CR tablet, Take 1 tablet (650 mg total) by mouth every 8 (eight) hours as needed for mild pain, Disp: 30 tablet, Rfl: 0    albuterol (Ventolin HFA) 90 mcg/act inhaler, Inhale 2 puffs every 6 (six) hours as needed for wheezing or shortness of breath, Disp: 8 5 g, Rfl: 3    AMILoride 5 mg tablet, Take 1 tablet (5 mg total) by mouth 2 (two) times a day, Disp: 180 tablet, Rfl: 3    aspirin 81 mg chewable tablet, Chew 1 tablet (81 mg total) daily, Disp: , Rfl:     budesonide-formoterol (SYMBICORT) 160-4 5 mcg/act inhaler, Inhale 2 puffs 2 (two) times a day Rinse mouth after use , Disp: 30 6 g, Rfl: 3    carvedilol (COREG) 25 mg tablet, Take 1 tablet (25 mg total) by mouth 2 (two) times a day with meals, Disp: 180 tablet, Rfl: 3    cholecalciferol (VITAMIN D3) 1,000 units tablet, Take 5 tablets (5,000 Units total) by mouth daily, Disp: 90 tablet, Rfl: 5    clonazePAM (KlonoPIN) 0 5 mg tablet, Take 1 tablet (0 5 mg total) by mouth 3 (three) times a day, Disp: 270 tablet, Rfl: 0    fluvoxaMINE (LUVOX) 100 mg tablet, 2 (two) times a day 1 tab am , 2 tabs HS, Disp: , Rfl:     HYDROcodone-acetaminophen (NORCO) 5-325 mg per tablet, Take 1 tablet by mouth every 8 (eight) hours as needed (moderate-severe headache pain) for up to 10 doses Max Daily Amount: 3 tablets, Disp: 10 tablet, Rfl: 0    lamoTRIgine (LaMICtal) 200 MG tablet, Take 200 mg by mouth 2 (two) times a day , Disp: , Rfl:     linaCLOtide (Linzess) 290 MCG CAPS, Take 1 capsule by mouth daily, Disp: 90 capsule, Rfl: 0    methylPREDNISolone 4 MG tablet therapy pack, Use as directed on package, Disp: 21 tablet, Rfl: 0    omeprazole (PriLOSEC) 40 MG capsule, Take 1 capsule (40 mg total) by mouth daily before breakfast, Disp: 90 capsule, Rfl: 3    ondansetron (ZOFRAN) 4 mg tablet, Take 1 tablet (4 mg total) by mouth every 8 (eight) hours as needed for nausea or vomiting, Disp: 20 tablet, Rfl: 1    Polyethylene Glycol 3350 (MIRALAX PO), Take by mouth as needed , Disp: , Rfl:     QUEtiapine (SEROquel) 100 mg tablet, Take 1 tablet by mouth daily at bedtime 100mg with 400mg bedtime , Disp: , Rfl:     QUEtiapine (SEROquel) 300 mg tablet, Take 300 mg by mouth in the morning 300mg in morning , Disp: , Rfl:     QUEtiapine (SEROquel) 400 MG tablet, Take 400 mg by mouth daily at bedtime  , Disp: , Rfl:    rosuvastatin (CRESTOR) 20 MG tablet, Take 1 tablet (20 mg total) by mouth daily (Patient taking differently: Take 20 mg by mouth every evening  ), Disp: 90 tablet, Rfl: 1    traMADol (Ultram) 50 mg tablet, Take 1 tablet (50 mg total) by mouth every 8 (eight) hours as needed for moderate pain, Disp: 30 tablet, Rfl: 0      Shady Alonzo PA-C

## 2022-05-17 NOTE — TELEPHONE ENCOUNTER
Pt is returning call from yesterday  Pt did not get the message about seeing Quynh Villalobos until today   Pt needs to be scheduled    #[phone]

## 2022-05-17 NOTE — PATIENT INSTRUCTIONS
Cast Care   WHAT YOU NEED TO KNOW:   Cast care will help the cast dry and harden correctly, and then protect it until it comes off  Your cast may need up to 48 hours to dry and harden completely  Even after your cast hardens, it can be damaged  DISCHARGE INSTRUCTIONS:   Return to the emergency department if:   Your cast breaks or gets damaged  You see drainage, or your cast is stained or smells bad  Your skin turns blue or pale  Your skin tingles, burns, or is cold or numb  You have severe pain that is getting worse and does not go away after you take pain medicine  Your limb swells, or your cast looks or feels tighter than it was before  Contact your healthcare provider if:   Something falls into your cast and gets stuck  You have itching, pain, burning, or weakness where you have the cast      You have a fever  You have sores, blisters, or breaks on the skin around the edges of the cast     You have questions or concerns about your condition or care  Follow up with your healthcare provider as directed: You will need to return to have your cast removed and your bones checked  Write down your questions so you remember to ask them during your visits  Care for your cast while it hardens:   Protect the cast   Do not put weight on the cast  Do not bend, lean on, or hit the cast with anything  Use the palms of your hands when you move the cast  Do not use your fingers  Your fingers may leave marks on the cast as it dries  Change positions often  Change your position every 2 hours to help the cast dry faster  Prop your cast on something soft, such as a pillow, to prevent a flat area on your cast      Keep the cast dry  Tie plastic trash bags around your cast to keep it dry while you bathe  You may use a blow dryer on cool or the lowest heat setting to dry your cast if it gets wet  Do not use a high heat setting, because you may burn your skin  Certain casts can get wet   Ask if you have a waterproof cast     Care for your cast after it hardens:   Check your cast every day  Contact your healthcare provider if you notice any cracks, dents, holes, or flaking on your cast      Keep your cast clean and dry  Cover your cast with a towel when you eat  You may have a small piece of cast that can be removed to check on incisions under your cast  Make sure the small piece of cast is kept tightly closed  If your cast gets dirty, use a mild detergent and a damp washcloth to wipe off the outside of your cast  Continue to cover your cast with trash bags to keep it dry while you bathe  Care for the edges of your cast   Cover the cast edges to keep them smooth  Use 4 inch pieces of waterproof tape  Place one end of the tape under the inside edge of your cast and fold it over to the outside surface  Overlap tape strips until the edges are completely covered  Change the tape as directed  Do not pull or repair any of the padding from inside the cast  This could cause blisters and sores on the skin under your cast      Keep weight off your cast   Do not let anyone push down or lean on your cast  This may cause it to break  Do not use sharp objects  Do not use a sharp or pointed object to scratch under your cast  This may cause wounds that can get infected, or you may lose the item inside the cast  If your skin itches, blow cool air under the cast  You may also gently scratch your skin outside the cast with a cloth  © Copyright [employer] 2022 Information is for End User's use only and may not be sold, redistributed or otherwise used for commercial purposes  All illustrations and images included in CareNotes® are the copyrighted property of A D A M [employer] Inc  or Lolly Poole   The above information is an  only  It is not intended as medical advice for individual conditions or treatments   Talk to your doctor, nurse or pharmacist before following any medical regimen to see if it is safe and effective for you

## 2022-05-18 ENCOUNTER — OFFICE VISIT (OUTPATIENT)
Dept: PODIATRY | Facility: CLINIC | Age: 62
End: 2022-05-18
Payer: MEDICARE

## 2022-05-18 VITALS — BODY MASS INDEX: 35.79 KG/M2 | HEIGHT: 67 IN | WEIGHT: 228 LBS

## 2022-05-18 DIAGNOSIS — L60.2 HYPERTROPHY OF NAIL: Primary | ICD-10-CM

## 2022-05-18 PROCEDURE — 99212 OFFICE O/P EST SF 10 MIN: CPT | Performed by: PODIATRIST

## 2022-05-18 NOTE — PROGRESS NOTES
PATIENT:  Brian Aquino [facility]  1960    ASSESSMENT/PLAN:  1  Hypertrophy of nail            The patient was educated in proper foot wear  Instructed skin care and protection  Nails trimmed  She may return as needed  HPI:  Parish Sandra is a 63 y  o year old female seen for chief complaint of elongated nails  She cannot trim her nails because she fell and broke her right arm  She presents with cast on right arm  The patient denied any acute pedal disorder        PAST MEDICAL HISTORY:  Past Medical History:   Diagnosis Date    Anxiety     Anxiety disorder     Bipolar 2 disorder ([address] )     Chronic back pain     Chronic kidney disease     Closed fracture of distal end of right fibula with routine healing 2020    COVID-19     in 2021    CVA [facility])     noted on MRI in the past    Depression     GERD (gastroesophageal reflux disease)     Hypercholesteremia     Hypernatremia     Hypertension     Hypokalemia     Idiopathic chronic pancreatitis ([facility] ) 3/20/2018    Intervertebral disc disorder with radiculopathy of lumbosacral region     resolved: 2015    Kidney disease     Panic attacks     Pericardial effusion     PONV (postoperative nausea and vomiting)     Radiculitis     resolved: 2015    Secondary renal hyperparathyroidism ([facility] [address] )     Vitamin D deficiency        PAST SURGICAL HISTORY:  Past Surgical History:   Procedure Laterality Date    BUNIONECTOMY      Left foot     COLON SURGERY      COLONOSCOPY  2021    DILATION AND CURETTAGE OF UTERUS      INDUCED       surgically induced    DE ANASTOMOSIS,AV,ANY SITE Left 2019    Procedure: CREATION FISTULA ARTERIOVENOUS (AV) left wrists possible left upper;  Surgeon: Paulina Brownlee MD;  Location:  MAIN OR;  Service: Vascular    DE Omid W/[place] W/DIST [address] Left 2019    Procedure: Radha Guerrero;  Surgeon: Tony Coreas Sandeep Varela DPM;  Location: [facility] MAIN OR;  Service: [address] W/[facility] W/DIST Michelle Hines Right 8/3/2020    Procedure: Lanora Reasons;  Surgeon: Severiano Nguyen DPM;  Location: [facility] MAIN [facility];  Service: Podiatry   [facility] W/[address] OSTEOT Right 9/27/2021    Procedure: [facility], right tameka osteotomy and 2nd claw toe correction;  Surgeon: Inga Perera MD;  Location: [facility] MA[facility];  Service: Orthopedics    MT ERCP DX COLLECTION SPECIMEN BRUSHING/WASHING N/A 4/11/2018    Procedure: ENDOSCOPIC RETROGRADE CHOLANGIOPANCREATOGRAPHY (ERCP);   Surgeon: Sana Gunn MD;  Location: [facility];  Service: Gastroenterology    MT LAP, SURG PROCTOPEXY N/A 7/13/2018    Procedure: ROBOTIC SIGMOID RESECTION / RECTOPEXY;  Surgeon: Dennis Cook MD;  Location: [facility] MAIN OR;  Service: Colorectal    MT OPEN TREATMENT RADIAL SHAFT FRACTURE Right 10/11/2021    Procedure: OPEN REDUCTION W/ INTERNAL FIXATION (ORIF) RADIUS (WRIST), RIGHT DISTAL;  Surgeon: James Alas MD;  Location: [facility];  Service: Orthopedics    MT SIGMOIDOSCOPY FLX DX W/COLLJ SPEC BR/WA IF PFRMD N/A 7/13/2018    Procedure: Omar Jihan;  Surgeon: Dennis Cook MD;  Location: [facility]IN OR;  Service: Colorectal    TUBAL LIGATION Bilateral 1997    US GUIDED THYROID BIOPSY  7/30/2019        ALLERGIES:  Pollen extract    MEDICATIONS:  Current Outpatient Medications   Medication Sig Dispense Refill    acetaminophen (TYLENOL) 650 mg CR tablet Take 1 tablet (650 mg total) by mouth every 8 (eight) hours as needed for mild pain 30 tablet 0    albuterol (Ventolin HFA) 90 mcg/act inhaler Inhale 2 puffs every 6 (six) hours as needed for wheezing or shortness of breath 8 5 g 3    AMILoride 5 mg tablet Take 1 tablet (5 mg total) by mouth 2 (two) times a day 180 tablet 3    aspirin 81 mg chewable tablet Chew 1 tablet (81 mg total) daily      budesonide-formoterol (SYMBICORT) 160-4 5 mcg/act inhaler Inhale 2 puffs 2 (two) times a day Rinse mouth after use  30 6 g 3    carvedilol (COREG) 25 mg tablet Take 1 tablet (25 mg total) by mouth 2 (two) times a day with meals 180 tablet 3    cholecalciferol (VITAMIN D3) 1,000 units tablet Take 5 tablets (5,000 Units total) by mouth daily 90 tablet 5    clonazePAM (KlonoPIN) 0 5 mg tablet Take 1 tablet (0 5 mg total) by mouth 3 (three) times a day 270 tablet 0    fluvoxaMINE (LUVOX) 100 mg tablet 2 (two) times a day 1 tab am , 2 tabs HS      HYDROcodone-acetaminophen (NORCO) 5-325 mg per tablet Take 1 tablet by mouth every 8 (eight) hours as needed (moderate-severe headache pain) for up to 10 doses Max Daily Amount: 3 tablets 10 tablet 0    lamoTRIgine (LaMICtal) 200 MG tablet Take 200 mg by mouth 2 (two) times a day       methylPREDNISolone 4 MG tablet therapy pack Use as directed on package 21 tablet 0    omeprazole (PriLOSEC) 40 MG capsule Take 1 capsule (40 mg total) by mouth daily before breakfast 90 capsule 3    ondansetron (ZOFRAN) 4 mg tablet Take 1 tablet (4 mg total) by mouth every 8 (eight) hours as needed for nausea or vomiting 20 tablet 1    Polyethylene Glycol 3350 (MIRALAX PO) Take by mouth as needed       QUEtiapine (SEROquel) 100 mg tablet Take 1 tablet by mouth daily at bedtime 100mg with 400mg bedtime       QUEtiapine (SEROquel) 300 mg tablet Take 300 mg by mouth in the morning 300mg in morning       QUEtiapine (SEROquel) 400 MG tablet Take 400 mg by mouth daily at bedtime        rosuvastatin (CRESTOR) 20 MG tablet Take 1 tablet (20 mg total) by mouth daily (Patient taking differently: Take 20 mg by mouth every evening) 90 tablet 1    traMADol (Ultram) 50 mg tablet Take 1 tablet (50 mg total) by mouth every 8 (eight) hours as needed for moderate pain 30 tablet 0    linaCLOtide (Linzess) 290 MCG CAPS Take 1 capsule by mouth daily 90 capsule 0     No current facility-administered medications for this visit         SOCIAL HISTORY:  Social History     Socioeconomic History    Marital status:      Spouse name: None    Number of children: None    Years of education: None    Highest education level: None   Occupational History    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   Tobacco Use    Smoking status: Never Smoker    Smokeless tobacco: Never Used   Vaping Use    Vaping Use: Never used   Substance and Sexual Activity    Alcohol use: Never    Drug use: Not Currently    Sexual activity: Not Currently   Other Topics Concern    None   Social History Narrative    Daily caffeine consumption 2-3 servings a day    Lives with family  No living will  Has dentures---no dental care  Primary language--English  Feels safe at home  Social Determinants of Health     Financial Resource Strain: Not on file   Food Insecurity: Not on file   Transportation Needs: Not on file   Physical Activity: Not on file   Stress: Not on file   Social Connections: Not on file   Intimate Partner Violence: Not on file   Housing Stability: Not on file        REVIEW OF SYSTEMS:  GENERAL: No fever or chills  HEART: No chest pain, or palpitation  RESPIRATORY:  No acute SOB or cough  GI: No Nausea, vomit or diarrhea  NEUROLOGIC: No syncope or acute weakness    PHYSICAL EXAM:  VASCULAR EXAM  Pedal pulses are intact  CRT is WNL  NEUROLOGIC EXAM  Sensation is intact to light touch  No focal neurologic deficit  DERMATOLOGIC EXAM:   No ulcer or cellulitis noted  The patient has hypertrophic toenails,  No notable skin lesion  MUSCULOSKELETAL EXAM:   No acute joint pain, edema, or redness  No acute musculoskeletal problem

## 2022-05-23 ENCOUNTER — TELEPHONE (OUTPATIENT)
Dept: OBGYN CLINIC | Facility: HOSPITAL | Age: 62
End: 2022-05-23

## 2022-05-23 NOTE — TELEPHONE ENCOUNTER
Dr Kirsten Anthony  RE: pre surgical clearance    Patient asked if she needs clearance from her PCP for her SX w/ Dr Zainab Hunter    Patient is also not sure how she is supposed to wash the area prior to [facility]  because she has a cast on

## 2022-05-24 ENCOUNTER — TELEPHONE (OUTPATIENT)
Dept: FAMILY MEDICINE CLINIC | Facility: HOSPITAL | Age: 62
End: 2022-05-24

## 2022-05-24 ENCOUNTER — APPOINTMENT (EMERGENCY)
Dept: CT IMAGING | Facility: HOSPITAL | Age: 62
End: 2022-05-24
Payer: MEDICARE

## 2022-05-24 ENCOUNTER — HOSPITAL ENCOUNTER (EMERGENCY)
Facility: HOSPITAL | Age: 62
Discharge: HOME | End: 2022-05-24
Attending: EMERGENCY MEDICINE
Payer: MEDICARE

## 2022-05-24 VITALS
WEIGHT: 225 LBS | TEMPERATURE: 98.6 F | BODY MASS INDEX: 35.31 KG/M2 | HEIGHT: 67 IN | RESPIRATION RATE: 18 BRPM | HEART RATE: 74 BPM | OXYGEN SATURATION: 94 % | SYSTOLIC BLOOD PRESSURE: 127 MMHG | DIASTOLIC BLOOD PRESSURE: 61 MMHG

## 2022-05-24 DIAGNOSIS — S09.90XA HEAD INJURY: Primary | ICD-10-CM

## 2022-05-24 PROCEDURE — 99283 EMERGENCY DEPT VISIT LOW MDM: CPT

## 2022-05-24 PROCEDURE — 99282 EMERGENCY DEPT VISIT SF MDM: CPT | Performed by: PHYSICIAN ASSISTANT

## 2022-05-24 PROCEDURE — G1004 CDSM NDSC: HCPCS

## 2022-05-24 PROCEDURE — 70450 CT HEAD/BRAIN W/O DYE: CPT

## 2022-05-24 NOTE — DISCHARGE INSTRUCTIONS
Rest, ice to forehead  If change in behavior or vomiting return to ER  Follow up with family doctor in 1 week for recheck

## 2022-05-24 NOTE — ED NOTES
Limb alert bracelet applied to left arm as AV fistula is present       Breann Gloria RN  05/24/22 [id number]

## 2022-05-24 NOTE — TELEPHONE ENCOUNTER
Pt states she fell on a [employer] clock last week and hit her head  Has a bump on head still, the size of a grape  Asking if she should be seen due to upcoming sx   PCB

## 2022-05-24 NOTE — ED NOTES
Patient returned from [address] [place] [address] [address] [facility], [address] [place] [address]  05/24/22 [id number]

## 2022-05-24 NOTE — ED PROVIDER NOTES
History  Chief Complaint   Patient presents with    Head Injury     Pt reports falling last week, stating she fell into a clock; bruising noted to right arm and lump to left forehead     Patient is a 64 y/o F with h/o Bipolar, HTN, CVA on aspirin daily, that presents to the ED with hematoma to left forehead after falling 9 days ago  She states she tripped and fell forward and hit her head on a clock  She denies LOC  She has mild headache  No dizziness, nausea, vomiting, or vision changes  She is alert and oriented  She has a bruise to her left upper arm, but denies pain  No other injuries  She does have a cast on her right wrist from a prior injury in October  History provided by:  Patient  Head Injury w/unknown LOC  Location:  Frontal  Time since incident:  9 days  Mechanism of injury: fall    Fall:     Fall occurred:  Tripped    Impact surface:  [employer] of impact:  Face    Entrapped after fall: no    Pain details:     Quality:  Aching    Radiates to: Face    Severity:  Mild    Duration:  9 days    Timing:  Constant    Progression:  Worsening  Chronicity:  New  Relieved by:  Nothing  Worsened by:  Nothing  Ineffective treatments:  None tried  Associated symptoms: headache    Associated symptoms: no blurred vision, no difficulty breathing, no disorientation, no double vision, no focal weakness, no loss of consciousness, no memory loss, no nausea, no neck pain, no numbness, no seizures, no tinnitus and no vomiting    Risk factors: aspirin        Prior to Admission Medications   Prescriptions Last Dose Informant Patient Reported? Taking?    AMILoride 5 mg tablet  Self No No   Sig: Take 1 tablet (5 mg total) by mouth 2 (two) times a day   HYDROcodone-acetaminophen (NORCO) 5-325 mg per tablet   No No   Sig: Take 1 tablet by mouth every 8 (eight) hours as needed (moderate-severe headache pain) for up to 10 doses Max Daily Amount: 3 tablets   Polyethylene Glycol 3350 (MIRALAX PO)  Self Yes No Sig: Take by mouth as needed    QUEtiapine (SEROquel) 100 mg tablet  Self Yes No   Sig: Take 1 tablet by mouth daily at bedtime 100mg with 400mg bedtime    QUEtiapine (SEROquel) 300 mg tablet  Self Yes No   Sig: Take 300 mg by mouth in the morning 300mg in morning    QUEtiapine (SEROquel) 400 MG tablet  Self Yes No   Sig: Take 400 mg by mouth daily at bedtime     acetaminophen (TYLENOL) 650 mg CR tablet   No No   Sig: Take 1 tablet (650 mg total) by mouth every 8 (eight) hours as needed for mild pain   albuterol (Ventolin HFA) 90 mcg/act inhaler  Self No No   Sig: Inhale 2 puffs every 6 (six) hours as needed for wheezing or shortness of breath   aspirin 81 mg chewable tablet  Self No No   Sig: Chew 1 tablet (81 mg total) daily   budesonide-formoterol (SYMBICORT) 160-4 5 mcg/act inhaler  Self No No   Sig: Inhale 2 puffs 2 (two) times a day Rinse mouth after use     carvedilol (COREG) 25 mg tablet  Self No No   Sig: Take 1 tablet (25 mg total) by mouth 2 (two) times a day with meals   cholecalciferol (VITAMIN D3) 1,000 units tablet  Self No No   Sig: Take 5 tablets (5,000 Units total) by mouth daily   clonazePAM (KlonoPIN) 0 5 mg tablet   No No   Sig: Take 1 tablet (0 5 mg total) by mouth 3 (three) times a day   fluvoxaMINE (LUVOX) 100 mg tablet  Self Yes No   Si (two) times a day 1 tab am , 2 tabs HS   lamoTRIgine (LaMICtal) 200 MG tablet  Self Yes No   Sig: Take 200 mg by mouth 2 (two) times a day    linaCLOtide (Linzess) 290 MCG CAPS  Self No No   Sig: Take 1 capsule by mouth daily   methylPREDNISolone 4 MG tablet therapy pack   No No   Sig: Use as directed on package   omeprazole (PriLOSEC) 40 MG capsule   No No   Sig: Take 1 capsule (40 mg total) by mouth daily before breakfast   ondansetron (ZOFRAN) 4 mg tablet   No No   Sig: Take 1 tablet (4 mg total) by mouth every 8 (eight) hours as needed for nausea or vomiting   rosuvastatin (CRESTOR) 20 MG tablet   No No   Sig: Take 1 tablet (20 mg total) by mouth daily   Patient taking differently: Take 20 mg by mouth every evening   traMADol (Ultram) 50 mg tablet   No No   Sig: Take 1 tablet (50 mg total) by mouth every 8 (eight) hours as needed for moderate pain      Facility-Administered Medications: None       Past Medical History:   Diagnosis Date    Anxiety     Anxiety disorder     Bipolar 2 disorder (HCC)     Chronic back pain     Chronic kidney disease     Closed fracture of distal end of right fibula with routine healing 2020    COVID-19     in 2021    CVA (cerebral vascular accident) [facility])     noted on MRI in the past    Depression     GERD (gastroesophageal reflux disease)     Hypercholesteremia     Hypernatremia     Hypertension     Hypokalemia     Idiopathic chronic pancreatitis ([facility] [address] ) 3/20/2018    Intervertebral disc disorder with radiculopathy of lumbosacral region     resolved: 2015    Kidney disease     Panic attacks     Pericardial effusion     PONV (postoperative nausea and vomiting)     Radiculitis     resolved: 2015    Secondary renal hyperparathyroidism ([facility] [address] )     Vitamin D deficiency        Past Surgical History:   Procedure Laterality Date    BUNIONECTOMY      Left foot     COLON SURGERY      COLONOSCOPY  2021    DILATION AND CURETTAGE OF UTERUS      INDUCED       surgically induced    ME ANASTOMOSIS,AV,ANY SITE Left 2019    Procedure: CREATION FISTULA ARTERIOVENOUS (AV) left wrists possible left upper;  Surgeon: Carmen Urbano MD;  [facility];  Service: Vascular    ME CORRJ HALLUX VALGUS W/SESMDC W/DIST Blue Daring Left 2019    Procedure: Rosalina Mittal;  Surgeon: Kirt Maria DPM;  [facility];  Service: Podiatry    ME [place] W/SESMDC W/DIST Blue Daring Right 8/3/2020    Procedure: Lien Sierra;  Surgeon: Kirt Maria DPM;  [facility];  Service: Podiatry    ME CORRJ HALLUX VALGUS W/SESMDC W/PROX PHLNX OSTEOT Right 9/27/2021    Procedure: Davonte Fonder, right tameka osteotomy and 2nd claw toe correction;  Surgeon: Miguel Oreilly MD;  [facility];  Service: Orthopedics    AZ ERCP DX COLLECTION SPECIMEN BRUSHING/WASHING N/A 4/11/2018    Procedure: ENDOSCOPIC RETROGRADE CHOLANGIOPANCREATOGRAPHY (ERCP);   Surgeon: Abrahan Go MD;  [facility];  Service: Gastroenterology    AZ LAP, SURG PROCTOPEXY N/A 7/13/2018    Procedure: ROBOTIC SIGMOID RESECTION / RECTOPEXY;  Surgeon: Chin Carrera MD;  [facility];  Service: Colorectal    AZ OPEN TREATMENT RADIAL SHAFT FRACTURE Right 10/11/2021    Procedure: OPEN REDUCTION W/ INTERNAL FIXATION (ORIF) RADIUS (WRIST), RIGHT DISTAL;  Surgeon: Gi Mcdowell MD;  [facility];  Service: Orthopedics    AZ SIGMOIDOSCOPY FLX DX W/COLLJ SPEC BR/WA IF PFRMD N/A 7/13/2018    Procedure: Franco Cleveland;  Surgeon: Chin Carrera MD;  [facility];  Service: Colorectal    TUBAL LIGATION Bilateral 1997   [place] [id number] THYROID BIOPSY  7/30/2019       Family History   Problem Relation Age of Onset    Bipolar disorder Mother     Mental illness Mother         depression    Stroke Mother     Dementia Mother     Colon polyps Mother     Heart disease Father     Hypertension Father     Diabetes Father     Other Family         Back disorder    Diabetes Family     Heart disease Family     Hypertension Family     Stroke Family     Thyroid disease Family     Breast cancer Paternal [de-identified]         age unknown   [facility] Breast cancer Paternal [de-identified]         age unknown   [facility] Breast cancer Maternal [de-identified]         age unknown    Mental illness Sister     Colon polyps Sister     Mental illness Sister     Heart disease Sister     No Known Problems Sister     Breast cancer Sister 76    Other Son         pituitary tumor    Hypertension Son     Obesity Son     No Known Problems Son     No Known Problems Maternal Grandmother     No Known Problems Maternal Grandfather     No Known Problems Paternal Grandfather     Breast cancer Paternal Aunt         age unknown    Substance Abuse Neg Hx         neg fam hx    Colon cancer Neg Hx      I have reviewed and agree with the history as documented  E-Cigarette/Vaping    E-Cigarette Use Never User      E-Cigarette/Vaping Substances    Nicotine No     THC No     CBD No     Flavoring No     Other No     Unknown No      Social History     Tobacco Use    Smoking status: Never Smoker    Smokeless tobacco: Never Used   Vaping Use    Vaping Use: Never used   Substance Use Topics    Alcohol use: Never    Drug use: Not Currently       Review of Systems   Constitutional: Negative for chills and fever  HENT: Negative for tinnitus  Eyes: Negative for blurred vision, double vision and visual disturbance  Gastrointestinal: Negative for nausea and vomiting  Musculoskeletal: Negative for neck pain  Skin: Negative for color change, rash and wound  Neurological: Positive for headaches  Negative for dizziness, tremors, focal weakness, seizures, loss of consciousness, facial asymmetry, speech difficulty, weakness, light-headedness and numbness  Psychiatric/Behavioral: Negative for confusion and memory loss  All other systems reviewed and are negative  Physical Exam  Physical Exam  Vitals and nursing note reviewed  Constitutional:       General: She is not in acute distress  Appearance: Normal appearance  She is well-developed, well-groomed and normal weight  She is not ill-appearing or diaphoretic  HENT:      Head: Normocephalic  Contusion (left forehead, small hematoma with bruising  ) present  No abrasion or laceration  Right Ear: Hearing and external ear normal       Left Ear: Hearing and external ear normal       Nose: Nose normal       Mouth/Throat:      Mouth: Mucous membranes are moist       Pharynx: Oropharynx is clear     Eyes:      General: Lids are normal  Extraocular Movements: Extraocular movements intact  Conjunctiva/sclera: Conjunctivae normal       Pupils: Pupils are equal, round, and reactive to light  Cardiovascular:      Rate and Rhythm: Normal rate and regular rhythm  Heart sounds: Normal heart sounds  Pulmonary:      Effort: Pulmonary effort is normal       Breath sounds: Normal breath sounds  No wheezing, rhonchi or rales  Musculoskeletal:      Cervical back: Normal range of motion and neck supple  No spinous process tenderness or muscular tenderness  Comments: Bruising to left upper arm, nontender to palpation  Cast to right wrist    B/L LE FROM nontender to palpation  Skin:     General: Skin is warm and dry  Findings: Bruising (left forehead and left upper arm  ) present  No abrasion  Neurological:      Mental Status: She is alert and oriented to person, place, and time  GCS: GCS eye subscore is 4  GCS verbal subscore is 5  GCS motor subscore is 6  Cranial Nerves: Cranial nerves are intact  Sensory: Sensation is intact  Motor: Motor function is intact  Gait: Gait is intact  Psychiatric:         Mood and Affect: Mood normal          Speech: Speech normal          Behavior: Behavior is cooperative           Vital Signs  ED Triage Vitals [05/24/22 0934]   Temperature Pulse Respirations Blood Pressure SpO2   98 6 °F (37 °C) 75 18 136/78 98 %      Temp Source Heart Rate Source Patient Position - Orthostatic VS BP Location FiO2 (%)   Tympanic Monitor Sitting Right arm --      Pain Score       5           Vitals:    05/24/22 1000 05/24/22 1030 05/24/22 1045 05/24/22 1100   BP: 116/56 119/58 126/60 127/61   Pulse: 79 78 79 74   Patient Position - Orthostatic VS: Sitting Sitting Sitting Sitting         Visual Acuity  Visual Acuity    Flowsheet Row Most Recent Value   L Pupil Size (mm) 3   R Pupil Size (mm) 3          ED Medications  Medications - No data to display    Diagnostic Studies  Results Reviewed None                 CT head without contrast   Final Result by Lyndon Santillan MD (05/24 6003)      No acute intracranial hemorrhage or mass effect  Stable additional findings as above  Minimal left frontal scalp soft tissue swelling  Workstation performed: [id number]                    Procedures  Procedures         ED Course                               SBIRT 22yo+    Flowsheet Row Most Recent Value   SBIRT (23 yo +)    In order to provide better care to our patients, we are screening all of our patients for alcohol and drug use  Would it be okay to ask you these screening questions? Yes Filed at: 05/24/2022 7355   Initial Alcohol Screen: US AUDIT-C     1  How often do you have a drink containing alcohol? 0 Filed at: 05/24/2022 0936   2  How many drinks containing alcohol do you have on a typical day you are drinking? 0 Filed at: 05/24/2022 0936   3a  Male UNDER 65: How often do you have five or more drinks on one occasion? 0 Filed at: 05/24/2022 0936   3b  FEMALE Any Age, or MALE 65+: How often do you have 4 or more drinks on one occassion? 0 Filed at: 05/24/2022 0936   Audit-C Score 0 Filed at: 05/24/2022 1609   ZAYRA: How many times in the past year have you    Used an illegal drug or used a prescription medication for non-medical reasons? Never Filed at: 05/24/2022 4029                    MDM  Number of Diagnoses or Management Options  Head injury: new and requires workup  Diagnosis management comments: Patient with head injury, on aspirin, will order CT to r/o hemorrhage  No acute abnormalities on CT scan, patient aware of prior stroke  Advised f/u with PCP for recheck           Amount and/or Complexity of Data Reviewed  Tests in the radiology section of CPT®: ordered and reviewed    Patient Progress  Patient progress: stable      Disposition  Final diagnoses:   Head injury     Time reflects when diagnosis was documented in both MDM as applicable and the Disposition within this note Time User Action Codes Description Comment    5/24/2022 10:59 AM Gwendalyn Holstein Add [[id number]] Head injury       ED Disposition     ED Disposition   Discharge    Condition   Stable    Date/Time   Tue May 24, 2022 10:59 AM    Comment   Shadia Cardozo [facility] discharge to home/self care                 Follow-up Information     Follow up With Specialties Details Why Colleen Mabry [address], DO Internal Medicine Call in 1 week For recheck [place]  [address] [phone]            Discharge Medication List as of 5/24/2022 11:00 AM      CONTINUE these medications which have NOT CHANGED    Details   acetaminophen (TYLENOL) 650 mg CR tablet Take 1 tablet (650 mg total) by mouth every 8 (eight) hours as needed for mild pain, Starting Mon 4/4/2022, Normal      albuterol (Ventolin HFA) 90 mcg/act inhaler Inhale 2 puffs every 6 (six) hours as needed for wheezing or shortness of breath, Starting Mon 5/3/2021, Normal      AMILoride 5 mg tablet Take 1 tablet (5 mg total) by mouth 2 (two) times a day, Starting Mon 1/3/2022, Normal      aspirin 81 mg chewable tablet Chew 1 tablet (81 mg total) daily, Starting Tue 12/28/2021, No Print      budesonide-formoterol (SYMBICORT) 160-4 5 mcg/act inhaler Inhale 2 puffs 2 (two) times a day Rinse mouth after use , Starting Mon 1/3/2022, Print      carvedilol (COREG) 25 mg tablet Take 1 tablet (25 mg total) by mouth 2 (two) times a day with meals, Starting Mon 2/14/2022, Normal      cholecalciferol (VITAMIN D3) 1,000 units tablet Take 5 tablets (5,000 Units total) by mouth daily, Starting Mon 1/31/2022, Normal      clonazePAM (KlonoPIN) 0 5 mg tablet Take 1 tablet (0 5 mg total) by mouth 3 (three) times a day, Starting Thu 4/7/2022, Normal      fluvoxaMINE (LUVOX) 100 mg tablet 2 (two) times a day 1 tab am , 2 tabs HS, Starting Wed 10/10/2018, Historical Med      HYDROcodone-acetaminophen (NORCO) 5-325 mg per tablet Take 1 tablet by mouth every 8 (eight) hours as needed (moderate-severe headache pain) for up to 10 doses Max Daily Amount: 3 tablets, Starting Mon 4/18/2022, Normal      lamoTRIgine (LaMICtal) 200 MG tablet Take 200 mg by mouth 2 (two) times a day , Starting Mon 4/13/2020, Historical Med      linaCLOtide (Linzess) 290 MCG CAPS Take 1 capsule by mouth daily, Starting Fri 10/15/2021, Until Fri 5/6/2022, Print      methylPREDNISolone 4 MG tablet therapy pack Use as directed on package, Normal      omeprazole (PriLOSEC) 40 MG capsule Take 1 capsule (40 mg total) by mouth daily before breakfast, Starting Tue 3/22/2022, Normal      ondansetron (ZOFRAN) 4 mg tablet Take 1 tablet (4 mg total) by mouth every 8 (eight) hours as needed for nausea or vomiting, Starting Wed 5/11/2022, Normal      Polyethylene Glycol 3350 (MIRALAX PO) Take by mouth as needed , Historical Med      !! QUEtiapine (SEROquel) 100 mg tablet Take 1 tablet by mouth daily at bedtime 100mg with 400mg bedtime , Historical Med      !! QUEtiapine (SEROquel) 300 mg tablet Take 300 mg by mouth in the morning 300mg in morning , Historical Med      !! QUEtiapine (SEROquel) 400 MG tablet Take 400 mg by mouth daily at bedtime  , Historical Med      rosuvastatin (CRESTOR) 20 MG tablet Take 1 tablet (20 mg total) by mouth daily, Starting Tue 4/5/2022, Normal      traMADol (Ultram) 50 mg tablet Take 1 tablet (50 mg total) by mouth every 8 (eight) hours as needed for moderate pain, Starting Fri 5/13/2022, Normal       !! - Potential duplicate medications found  Please discuss with provider  No discharge procedures on file      PDMP Review       Value Time User    PDMP Reviewed  Yes 5/13/2022  1:12 PM Lucia Castillo DO          ED Provider  Electronically Signed by           Surendra Jacques PA-C  05/24/22 3775

## 2022-05-26 ENCOUNTER — TELEPHONE (OUTPATIENT)
Dept: FAMILY MEDICINE CLINIC | Facility: HOSPITAL | Age: 62
End: 2022-05-26

## 2022-05-26 ENCOUNTER — OFFICE VISIT (OUTPATIENT)
Dept: OBGYN CLINIC | Facility: CLINIC | Age: 62
End: 2022-05-26
Payer: MEDICARE

## 2022-05-26 VITALS
SYSTOLIC BLOOD PRESSURE: 120 MMHG | WEIGHT: 224 LBS | DIASTOLIC BLOOD PRESSURE: 76 MMHG | BODY MASS INDEX: 35.16 KG/M2 | HEIGHT: 67 IN

## 2022-05-26 DIAGNOSIS — Z47.89 ENCOUNTER FOR CAST CHANGE: Primary | ICD-10-CM

## 2022-05-26 PROCEDURE — 29075 APPL CST ELBW FNGR SHORT ARM: CPT | Performed by: PHYSICIAN ASSISTANT

## 2022-05-26 PROCEDURE — 29125 APPL SHORT ARM SPLINT STATIC: CPT

## 2022-05-26 PROCEDURE — 99211 OFF/OP EST MAY X REQ PHY/QHP: CPT | Performed by: PHYSICIAN ASSISTANT

## 2022-05-26 NOTE — PATIENT INSTRUCTIONS
Cast Care   WHAT YOU NEED TO KNOW:   Cast care will help the cast dry and harden correctly, and then protect it until it comes off  Your cast may need up to 48 hours to dry and harden completely  Even after your cast hardens, it can be damaged  DISCHARGE INSTRUCTIONS:   Return to the emergency department if:   Your cast breaks or gets damaged  You see drainage, or your cast is stained or smells bad  Your skin turns blue or pale  Your skin tingles, burns, or is cold or numb  You have severe pain that is getting worse and does not go away after you take pain medicine  Your limb swells, or your cast looks or feels tighter than it was before  Contact your healthcare provider if:   Something falls into your cast and gets stuck  You have itching, pain, burning, or weakness where you have the cast      You have a fever  You have sores, blisters, or breaks on the skin around the edges of the cast     You have questions or concerns about your condition or care  Follow up with your healthcare provider as directed: You will need to return to have your cast removed and your bones checked  Write down your questions so you remember to ask them during your visits  Care for your cast while it hardens:   Protect the cast   Do not put weight on the cast  Do not bend, lean on, or hit the cast with anything  Use the palms of your hands when you move the cast  Do not use your fingers  Your fingers may leave marks on the cast as it dries  Change positions often  Change your position every 2 hours to help the cast dry faster  Prop your cast on something soft, such as a pillow, to prevent a flat area on your cast      Keep the cast dry  Tie plastic trash bags around your cast to keep it dry while you bathe  You may use a blow dryer on cool or the lowest heat setting to dry your cast if it gets wet  Do not use a high heat setting, because you may burn your skin  Certain casts can get wet   Ask if you have a waterproof cast     Care for your cast after it hardens:   Check your cast every day  Contact your healthcare provider if you notice any cracks, dents, holes, or flaking on your cast      Keep your cast clean and dry  Cover your cast with a towel when you eat  You may have a small piece of cast that can be removed to check on incisions under your cast  Make sure the small piece of cast is kept tightly closed  If your cast gets dirty, use a mild detergent and a damp washcloth to wipe off the outside of your cast  Continue to cover your cast with trash bags to keep it dry while you bathe  Care for the edges of your cast   Cover the cast edges to keep them smooth  Use 4 inch pieces of waterproof tape  Place one end of the tape under the inside edge of your cast and fold it over to the outside surface  Overlap tape strips until the edges are completely covered  Change the tape as directed  Do not pull or repair any of the padding from inside the cast  This could cause blisters and sores on the skin under your cast      Keep weight off your cast   Do not let anyone push down or lean on your cast  This may cause it to break  Do not use sharp objects  Do not use a sharp or pointed object to scratch under your cast  This may cause wounds that can get infected, or you may lose the item inside the cast  If your skin itches, blow cool air under the cast  You may also gently scratch your skin outside the cast with a cloth  © Copyright [employer] 2022 Information is for End User's use only and may not be sold, redistributed or otherwise used for commercial purposes  All illustrations and images included in CareNotes® are the copyrighted property of A [employer]  or [employer]   The above information is an  only  It is not intended as medical advice for individual conditions or treatments   Talk to your doctor, nurse or pharmacist before following any medical regimen to see if it is safe and effective for you

## 2022-05-26 NOTE — PROGRESS NOTES
Orthopaedic Surgery - Office Note  José Miguel Hodge (30 y o  female)   : 1960   MRN: [id number]  Encounter Date: 2022    No chief complaint on file  Cast change request      Assessment/Plan  Diagnoses and all orders for this visit:    Encounter for cast change    Other orders  -     Cast application    Cast care instructions were again reviewed with the patient  She will follow up with attending as scheduled  Return with Dr Onel Gallagher as scheduled  History of Present Illness  This is a previous patient to myself as well as a surgical patient of Dr Onel Gallagher who presents today requesting another cast change  Patient was placed in a thumb spica on 2022 and returns back again today for another requested cast change  Review of Systems  Pertinent items are noted in HPI  All other systems were reviewed and are negative  Physical Exam  LMP  (LMP Unknown)   Cons: Appears well  No apparent distress  Psych: Alert  Oriented x3  Mood and affect normal   Eyes: PERRLA, EOMI  Resp: Normal effort  No audible wheezing or stridor  CV: Palpable pulse  No discernable arrhythmia  Lymph:  No palpable cervical, axillary, or inguinal lymphadenopathy  Skin: Warm  No palpable masses  No visible lesions  Neuro: Normal muscle tone  Normal and symmetric DTR's  Patient's thumb spica cast was changed today in the office  Patient remains neurovascularly intact without any skin breakdown lesions or signs of infection        Studies Reviewed  Phone messages, ED notes, and Dr Onel Gallagher notes were reviewed by myself    Cast application    Date/Time: 2022 10:18 AM  Performed by: Leana Leal  Authorized by: Shady Alonzo PA-C   Universal Protocol:  Consent: Verbal consent obtained    Risks and benefits: risks, benefits and alternatives were discussed  Consent given by: patient  Time out: Immediately prior to procedure a "time out" was called to verify the correct patient, procedure, equipment, support staff and site/side marked as required  Patient understanding: patient states understanding of the procedure being performed  Patient consent: the patient's understanding of the procedure matches consent given  Relevant documents: relevant documents present and verified  Test results: test results available and properly labeled  Site marked: the operative site was marked  Radiology Images displayed and confirmed  If images not available, report reviewed: imaging studies available  Patient identity confirmed: verbally with patient      Pre-procedure details:     Sensation:  Normal  Procedure details:     Laterality:  Right    Location:  Wrist    Wrist:  R wrist    Splint type:  Thumb spica  Post-procedure details:     Pain:  Improved    Sensation:  Normal    Patient tolerance of procedure: Tolerated well, no immediate complications      Medical, Surgical, Family, and Social History  The patient's medical history, family history, and social history, were reviewed and updated as appropriate      Past Medical History:   Diagnosis Date    Anxiety     Anxiety disorder     Bipolar 2 disorder ([facility] [place] [facility] )     Chronic back pain     Chronic kidney disease     Closed fracture of distal end of right fibula with routine healing 11/4/2020    COVID-19     in Jan 2021    CVA (cerebral vascular accident) [facility])     noted on MRI in the past    Depression     GERD (gastroesophageal reflux disease)     Hypercholesteremia     Hypernatremia     Hypertension     Hypokalemia     Idiopathic chronic pancreatitis ([facility] [facility] ) 3/20/2018    Intervertebral disc disorder with radiculopathy of lumbosacral region     resolved: 12/28/2015    Kidney disease     Panic attacks     Pericardial effusion     PONV (postoperative nausea and vomiting)     Radiculitis     resolved: 12/28/2015    Secondary renal hyperparathyroidism ([facility] [place] [facility] )     Vitamin D deficiency        Past Surgical History:   Procedure Laterality Date    BUNIONECTOMY      Left foot     COLON SURGERY      COLONOSCOPY  2021    DILATION AND CURETTAGE OF UTERUS      INDUCED       surgically induced    [place] ANASTOMOSIS,AV,ANY SITE Left 2019    Procedure: CREATION FISTULA ARTERIOVENOUS (AV) left wrists possible left upper;  Surgeon: Carmen Urbano MD;  [facility];  Service: Vascular    W[place] CORRJ HALLUX VALGUS W/SESMDC W/DIST [place] Left 2019    Procedure: Rosalina Mittal;  Surgeon: Kirt Maria DPM;  [facility];  Service: [address] W/SESMDC W/DIST [place] Right 8/3/2020    Procedure: Lien Mariela;  Surgeon: Kirt Maria DPM;  [facility];  Service: Podiatry    [place] W/SESMDC [place] PHLNX OSTEOT Right 2021    Procedure: Mamadou Basket, right tameka osteotomy and 2nd claw toe correction;  Surgeon: Olamide Stanley MD;  [facility];  Service: Orthopedics    [place] ERCP DX COLLECTION SPECIMEN BRUSHING/WASHING N/A 2018    Procedure: ENDOSCOPIC RETROGRADE CHOLANGIOPANCREATOGRAPHY (ERCP);   Surgeon: Yeyo Long MD;  [facility];  Service: Gastroenterology    W[place] LAP, SURG PROCTOPEXY N/A 2018    Procedure: ROBOTIC SIGMOID RESECTION / RECTOPEXY;  Surgeon: Abhishek Rivero MD;  [facility];  Service: Colorectal    WY OPEN TREATMENT RADIAL SHAFT FRACTURE Right 10/11/2021    Procedure: OPEN REDUCTION W/ INTERNAL FIXATION (ORIF) RADIUS (WRIST), RIGHT DISTAL;  Surgeon: Shu Munoz MD;  [facility];  Service: Orthopedics    WY SIGMOIDOSCOPY FLX DX W/COLLJ SPEC BR/WA IF PFRMD N/A 2018    Procedure: Javan Patient;  Surgeon: Abhishek Rivero MD;  [facility];  Service: Colorectal    TUBAL LIGATION Bilateral [facility] THYROID BIOPSY  2019       Family History   Problem Relation Age of Onset    Bipolar disorder Mother     Mental illness Mother         depression    Stroke Mother     Dementia Mother     Colon polyps Mother     Heart disease Father     Hypertension Father     Diabetes Father     Other Family         Back disorder    Diabetes Family     Heart disease Family     Hypertension Family     Stroke Family     Thyroid disease Family     Breast cancer Paternal [de-identified]         age unknown   Emaline Folds Breast cancer Paternal [de-identified]         age unknown   Emaline Folds Breast cancer Maternal Aunt         age unknown    Mental illness Sister     Colon polyps Sister     Mental illness Sister     Heart disease Sister     No Known Problems Sister     Breast cancer Sister 76    Other Son         pituitary tumor    Hypertension Son     Obesity Son     No Known Problems Son     No Known Problems Maternal Grandmother     No Known Problems Maternal Grandfather     No Known Problems Paternal Grandfather     Breast cancer Paternal Aunt         age unknown    Substance Abuse Neg Hx         neg fam hx    Colon cancer Neg Hx        Social History     Occupational History    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   Tobacco Use    Smoking status: Never Smoker    Smokeless tobacco: Never Used   Vaping Use    Vaping Use: Never used   Substance and Sexual Activity    Alcohol use: Never    Drug use: Not Currently    Sexual activity: Not Currently       Allergies   Allergen Reactions    Pollen Extract Nasal Congestion       No current outpatient medications on file        Jung Jonas PA-C

## 2022-05-26 NOTE — TELEPHONE ENCOUNTER
· Left message for patient to call and schedule appointment  I blocked time in Dr Cee Aw schedule tomorrow 22 for her  ·   ·   · [facility])   Vern Jeronimo, this is Gato Ford with anesthesia at [facility] today  One of your patients, Loida Boss,  64, was scheduled a case with Dr Burl Kocher today  However, she did go to the ER yesterday with concerns for a HA, forehead bruising, and ? Concussion after falling 10 days ago  She is on ASA  she still complains of HA symptoms  Unfortunately, given the ER visit yesterday and ongoing symptoms, we cancelled her case today  May this patient visit you soon for further assessment and medical clearance? Dr Juan José Lozoya team will work on rescheduling her procedure in the near future   Thank you for your time and attention, Kera Jha  9:08 AM  20 days left    Could you schedule her for me tomorrow or next week with deja  9:09 AM  20 days left

## 2022-05-27 ENCOUNTER — NURSE TRIAGE (OUTPATIENT)
Dept: OTHER | Facility: OTHER | Age: 62
End: 2022-05-27

## 2022-05-27 ENCOUNTER — OFFICE VISIT (OUTPATIENT)
Dept: FAMILY MEDICINE CLINIC | Facility: HOSPITAL | Age: 62
End: 2022-05-27
Payer: MEDICARE

## 2022-05-27 VITALS
WEIGHT: 224.2 LBS | BODY MASS INDEX: 35.19 KG/M2 | DIASTOLIC BLOOD PRESSURE: 76 MMHG | SYSTOLIC BLOOD PRESSURE: 132 MMHG | HEART RATE: 88 BPM | HEIGHT: 67 IN | OXYGEN SATURATION: 99 %

## 2022-05-27 DIAGNOSIS — M79.601 PAIN OF RIGHT UPPER EXTREMITY: ICD-10-CM

## 2022-05-27 DIAGNOSIS — S00.03XA CONTUSION OF LEFT TEMPOROFRONTAL SCALP, INITIAL ENCOUNTER: ICD-10-CM
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
## 2022-05-27 DIAGNOSIS — S69.91XS INJURY OF RIGHT THUMB, SEQUELA: ICD-10-CM

## 2022-05-27 PROCEDURE — 99214 OFFICE O/P EST MOD 30 MIN: CPT | Performed by: INTERNAL MEDICINE

## 2022-05-27 NOTE — PROGRESS NOTES
Assessment/Plan:             Problem List Items Addressed This Visit        Other    Injury of right thumb      Other Visit Diagnoses     Fall, initial encounter    -  Primary    Contusion of left temporofrontal scalp, initial encounter                Subjective:      Patient ID: Hakan Cummins is a 63 y o  female    Fall- was to have surgery yesterday 5/26/22  to remove hardware form original  Fracture in October after fall  rigth wrist    Anesthesia had declined to do surgery due to lump on head and recent fall while on  Had tripped over dog and hit clock with head and went to ER 5/24/22- they did do ct in ER  No headache now  Denies light headedness or dizziness  now  On increased bp meds- carvediolol is now 25 mg bid- Dr Emiliano Mejia      The following portions of the patient's history were reviewed and updated as appropriate: allergies, current medications and problem list      Review of Systems   HENT: Negative for congestion  Has dentures- mild tmj tenderness on left    Respiratory: Negative for cough and shortness of breath  Cardiovascular: Negative for chest pain  Neurological: Negative for dizziness, light-headedness and headaches           Objective:      Current Outpatient Medications:     albuterol (Ventolin HFA) 90 mcg/act inhaler, Inhale 2 puffs every 6 (six) hours as needed for wheezing or shortness of breath, Disp: 8 5 g, Rfl: 3    AMILoride 5 mg tablet, Take 1 tablet (5 mg total) by mouth 2 (two) times a day, Disp: 180 tablet, Rfl: 3    aspirin 81 mg chewable tablet, Chew 1 tablet (81 mg total) daily, Disp: , Rfl:     cholecalciferol (VITAMIN D3) 1,000 units tablet, Take 5 tablets (5,000 Units total) by mouth daily, Disp: 90 tablet, Rfl: 5    clonazePAM (KlonoPIN) 0 5 mg tablet, Take 1 tablet (0 5 mg total) by mouth 3 (three) times a day, Disp: 270 tablet, Rfl: 0    fluvoxaMINE (LUVOX) 100 mg tablet, 2 (two) times a day 1 tab am , 2 tabs HS, Disp: , Rfl:     lamoTRIgine (LaMICtal) 200 MG tablet, Take 200 mg by mouth 2 (two) times a day , Disp: , Rfl:     linaCLOtide (Linzess) 290 MCG CAPS, Take 1 capsule by mouth daily, Disp: 90 capsule, Rfl: 0    ondansetron (ZOFRAN) 4 mg tablet, Take 1 tablet (4 mg total) by mouth every 8 (eight) hours as needed for nausea or vomiting, Disp: 20 tablet, Rfl: 1    Polyethylene Glycol 3350 (MIRALAX PO), Take by mouth as needed , Disp: , Rfl:     QUEtiapine (SEROquel) 100 mg tablet, Take 1 tablet by mouth daily at bedtime 100mg with 400mg bedtime , Disp: , Rfl:     QUEtiapine (SEROquel) 300 mg tablet, Take 300 mg by mouth in the morning 300mg in morning , Disp: , Rfl:     QUEtiapine (SEROquel) 400 MG tablet, Take 400 mg by mouth daily at bedtime  , Disp: , Rfl:     carvedilol (COREG) 25 mg tablet, Take 1 tablet (25 mg total) by mouth 2 (two) times a day with meals, Disp: 180 tablet, Rfl: 3    omeprazole (PriLOSEC) 40 MG capsule, Take 1 capsule (40 mg total) by mouth daily before breakfast, Disp: 90 capsule, Rfl: 3    rosuvastatin (CRESTOR) 20 MG tablet, Take 1 tablet (20 mg total) by mouth every evening, Disp: 90 tablet, Rfl: 3    traMADol (Ultram) 50 mg tablet, Take 1 tablet (50 mg total) by mouth every 8 (eight) hours as needed for moderate pain, Disp: 30 tablet, Rfl: 0    Blood pressure 132/76, pulse 88, height 5' 7" (1 702 m), weight 102 kg (224 lb 3 2 oz), SpO2 99 %, not currently breastfeeding  Physical Exam  Vitals and nursing note reviewed  Constitutional:       General: She is not in acute distress  Appearance: Normal appearance  HENT:      Right Ear: Tympanic membrane normal  There is no impacted cerumen  Left Ear: Tympanic membrane normal  There is no impacted cerumen  Mouth/Throat:      Pharynx: No posterior oropharyngeal erythema  Eyes:      General: No scleral icterus  Right eye: No discharge  Left eye: No discharge  Cardiovascular:      Rate and Rhythm: Normal rate and regular rhythm  Heart sounds: No murmur heard  Pulmonary:      Breath sounds: No wheezing or rhonchi  Abdominal:      General: Abdomen is flat  Palpations: Abdomen is soft  Tenderness: There is no abdominal tenderness  Musculoskeletal:         General: No swelling, tenderness or deformity  Comments: Right hand in pink cast- warm distal hand   Skin:     Findings: No erythema  Neurological:      General: No focal deficit present  Mental Status: She is alert  Psychiatric:         Mood and Affect: Mood normal          Thought Content:  Thought content normal          Judgment: Judgment normal       cleared  Medically for planned surgery -    have reviewed ct from Er   EKG

## 2022-05-27 NOTE — TELEPHONE ENCOUNTER
Regarding: Medication Issue - Pain  ----- Message from Elke Grimes sent at 5/27/2022  6:36 PM EDT -----  "I had an office visit today with Dr Chad Crandall regarding a hospital visit for a fall that I had  I'm going to have surgery  She never called in my tramadol "    Put this in the hub for the possibility that suggestions for pain management could be provided

## 2022-05-28 NOTE — TELEPHONE ENCOUNTER
Reason for Disposition   Prescription refill request for a CONTROLLED substance (e g , narcotics, ADHD medicines)    Answer Assessment - Initial Assessment Questions  1  NAME of MEDICATION: "What medicine are you calling about?"      Tramadol    2  QUESTION: "What is your question?" (e g , medication refill, side effect)      Not called in to pharmacy    3  PRESCRIBING HCP: "Who prescribed it?" Reason: if prescribed by specialist, call should be referred to that [facility]  [address]    4  SYMPTOMS: "Do you have any symptoms?"      Pain    5  SEVERITY: If symptoms are present, ask "Are they mild, moderate or severe?"      Moderate    6   PREGNANCY:  "Is there any chance that you are pregnant?" "When was your last menstrual period?"      N/A    Protocols used: MEDICATION QUESTION CALL-ADULT-

## 2022-05-30 RX ORDER — TRAMADOL HYDROCHLORIDE 50 MG/1
50 TABLET ORAL EVERY 8 HOURS PRN
Qty: 30 TABLET | Refills: 0 | Status: SHIPPED | OUTPATIENT
Start: 2022-05-30 | End: 2022-06-14 | Stop reason: SDUPTHER

## 2022-06-01 ENCOUNTER — TELEPHONE (OUTPATIENT)
Dept: OBGYN CLINIC | Facility: HOSPITAL | Age: 62
End: 2022-06-01

## 2022-06-01 NOTE — TELEPHONE ENCOUNTER
Patient sees Dr Francisca Chou  Patient is calling in stating that she was supposed to have surgery on 5/26 due to her having a headache  She is calling in stating that she was cleared by Dr Wanda Ace her PCP on 5/27 and is asking if she is able to reschedule her surgery with the   Patient is asking for a call back to get this scheduled at [phone]

## 2022-06-02 ENCOUNTER — TELEPHONE (OUTPATIENT)
Dept: OBGYN CLINIC | Facility: HOSPITAL | Age: 62
End: 2022-06-02

## 2022-06-02 NOTE — TELEPHONE ENCOUNTER
DR KIMBALL [facility]  RE     Patient returning call to confirm that her right hand sugery on 6/23 with Dr JIGAR SANTIAGO is  all good with her  Apologize    Note sent twice

## 2022-06-02 NOTE — TELEPHONE ENCOUNTER
Patient returning call to confirm that her right hand sugery on 6/23 with Dr Diana Jaime is  all good with her

## 2022-06-06 DIAGNOSIS — E23.2 DIABETES INSIPIDUS (HCC): ICD-10-CM

## 2022-06-06 DIAGNOSIS — N18.30 CHRONIC KIDNEY DISEASE, STAGE 3 (HCC): ICD-10-CM

## 2022-06-06 DIAGNOSIS — N25.81 SECONDARY RENAL HYPERPARATHYROIDISM (HCC): ICD-10-CM

## 2022-06-06 DIAGNOSIS — N28.1 RENAL CYST: ICD-10-CM

## 2022-06-06 DIAGNOSIS — I10 ESSENTIAL HYPERTENSION: ICD-10-CM

## 2022-06-06 DIAGNOSIS — E78.00 HYPERCHOLESTEREMIA: ICD-10-CM

## 2022-06-06 RX ORDER — ROSUVASTATIN CALCIUM 20 MG/1
20 TABLET, COATED ORAL EVERY EVENING
Qty: 90 TABLET | Refills: 3 | Status: SHIPPED | OUTPATIENT
Start: 2022-06-06

## 2022-06-06 RX ORDER — CARVEDILOL 25 MG/1
25 TABLET ORAL 2 TIMES DAILY WITH MEALS
Qty: 180 TABLET | Refills: 3 | Status: SHIPPED | OUTPATIENT
Start: 2022-06-06

## 2022-06-07 DIAGNOSIS — K21.9 GASTROESOPHAGEAL REFLUX DISEASE, UNSPECIFIED WHETHER ESOPHAGITIS PRESENT: ICD-10-CM

## 2022-06-07 RX ORDER — OMEPRAZOLE 40 MG/1
40 CAPSULE, DELAYED RELEASE ORAL
Qty: 90 CAPSULE | Refills: 3 | Status: SHIPPED | OUTPATIENT
Start: 2022-06-07

## 2022-06-14 DIAGNOSIS — M79.601 PAIN OF RIGHT UPPER EXTREMITY: ICD-10-CM

## 2022-06-14 RX ORDER — TRAMADOL HYDROCHLORIDE 50 MG/1
50 TABLET ORAL EVERY 8 HOURS PRN
Qty: 30 TABLET | Refills: 0 | Status: SHIPPED | OUTPATIENT
Start: 2022-06-14 | End: 2022-07-05 | Stop reason: SDUPTHER

## 2022-06-15 ENCOUNTER — PREP FOR PROCEDURE (OUTPATIENT)
Dept: OBGYN CLINIC | Facility: CLINIC | Age: 62
End: 2022-06-15

## 2022-06-17 NOTE — PRE-PROCEDURE INSTRUCTIONS
Pre-Surgery Instructions:   Medication Instructions    albuterol (Ventolin HFA) 90 mcg/act inhaler Uses PRN- OK to take day of surgery    AMILoride 5 mg tablet Hold day of surgery   aspirin 81 mg chewable tablet Hold day of surgery   carvedilol (COREG) 25 mg tablet Take day of surgery   cholecalciferol (VITAMIN D3) 1,000 units tablet Hold day of surgery   clonazePAM (KlonoPIN) 0 5 mg tablet Take day of surgery   fluvoxaMINE (LUVOX) 100 mg tablet Take day of surgery   lamoTRIgine (LaMICtal) 200 MG tablet Take day of surgery   linaCLOtide (Linzess) 290 MCG CAPS Hold day of surgery   omeprazole (PriLOSEC) 40 MG capsule Take day of surgery   ondansetron (ZOFRAN) 4 mg tablet Uses PRN- OK to take day of surgery    QUEtiapine (SEROquel) 100 mg tablet Take day of surgery   QUEtiapine (SEROquel) 300 mg tablet Take day of surgery   QUEtiapine (SEROquel) 400 MG tablet Take night before surgery    rosuvastatin (CRESTOR) 20 MG tablet Take night before surgery    traMADol (Ultram) 50 mg tablet Uses PRN- OK to take day of surgery    You will receive a phone call from hospital for arrival time  Please call surgeons office if any changes in your condition  Wear easy on/off clothing; consider type of surgery;  Valuables, jewelry, piercing's please keep at home  **COVID-19  education/surgical guidelines  Updated covid    Visitation policy  Please: No contact lenses or eye make up, artificial eyelashes    Please secure transportation     Follow pre surgery showering or cleaning instructions as  Reviewed by nurse or surgeons office      Questions answered and concerns addressed

## 2022-06-20 DIAGNOSIS — F31.81 BIPOLAR 2 DISORDER (HCC): Chronic | ICD-10-CM

## 2022-06-21 DIAGNOSIS — S99.921A INJURY OF PLANTAR PLATE, RIGHT, INITIAL ENCOUNTER: ICD-10-CM

## 2022-06-21 DIAGNOSIS — M20.5X1 CLAW TOE, ACQUIRED, RIGHT: ICD-10-CM

## 2022-06-21 DIAGNOSIS — M20.11 ACQUIRED HALLUX INTERPHALANGEUS, RIGHT: ICD-10-CM

## 2022-06-21 RX ORDER — ONDANSETRON 4 MG/1
4 TABLET, FILM COATED ORAL EVERY 8 HOURS PRN
Qty: 20 TABLET | Refills: 1 | Status: SHIPPED | OUTPATIENT
Start: 2022-06-21 | End: 2022-06-21 | Stop reason: SDUPTHER

## 2022-06-21 RX ORDER — CLONAZEPAM 0.5 MG/1
0.5 TABLET ORAL 3 TIMES DAILY
Qty: 270 TABLET | Refills: 0 | Status: SHIPPED | OUTPATIENT
Start: 2022-06-21

## 2022-06-21 RX ORDER — ONDANSETRON 4 MG/1
4 TABLET, FILM COATED ORAL EVERY 8 HOURS PRN
Qty: 60 TABLET | Refills: 1 | Status: SHIPPED | OUTPATIENT
Start: 2022-06-21 | End: 2022-06-27 | Stop reason: SDUPTHER

## 2022-06-22 ENCOUNTER — ANESTHESIA EVENT (OUTPATIENT)
Dept: PERIOP | Facility: HOSPITAL | Age: 62
End: 2022-06-22
Payer: MEDICARE

## 2022-06-23 ENCOUNTER — HOSPITAL ENCOUNTER (OUTPATIENT)
Facility: HOSPITAL | Age: 62
Setting detail: OUTPATIENT SURGERY
Discharge: HOME | End: 2022-06-23
Attending: ORTHOPAEDIC SURGERY | Admitting: ORTHOPAEDIC SURGERY
Payer: MEDICARE

## 2022-06-23 ENCOUNTER — APPOINTMENT (OUTPATIENT)
Dept: RADIOLOGY | Facility: HOSPITAL | Age: 62
End: 2022-06-23
Payer: MEDICARE

## 2022-06-23 ENCOUNTER — ANESTHESIA (OUTPATIENT)
Dept: PERIOP | Facility: HOSPITAL | Age: 62
End: 2022-06-23
Payer: MEDICARE

## 2022-06-23 VITALS
DIASTOLIC BLOOD PRESSURE: 78 MMHG | BODY MASS INDEX: 33.87 KG/M2 | SYSTOLIC BLOOD PRESSURE: 175 MMHG | HEIGHT: 67 IN | TEMPERATURE: 98.2 F | HEART RATE: 90 BPM | OXYGEN SATURATION: 96 % | WEIGHT: 215.8 LBS | RESPIRATION RATE: 20 BRPM

## 2022-06-23 DIAGNOSIS — T84.84XA PAINFUL ORTHOPAEDIC HARDWARE (HCC): Primary | ICD-10-CM

## 2022-06-23 PROCEDURE — 25310 TRANSPLANT FOREARM TENDON: CPT | Performed by: ORTHOPAEDIC SURGERY

## 2022-06-23 PROCEDURE — 20680 REMOVAL OF IMPLANT DEEP: CPT | Performed by: ORTHOPAEDIC SURGERY

## 2022-06-23 PROCEDURE — 25310 TRANSPLANT FOREARM TENDON: CPT | Performed by: PHYSICIAN ASSISTANT

## 2022-06-23 PROCEDURE — 73100 X-RAY EXAM OF WRIST: CPT

## 2022-06-23 PROCEDURE — NC001 PR NO CHARGE: Performed by: ORTHOPAEDIC SURGERY

## 2022-06-23 PROCEDURE — 20680 REMOVAL OF IMPLANT DEEP: CPT | Performed by: PHYSICIAN ASSISTANT

## 2022-06-23 RX ORDER — MAGNESIUM HYDROXIDE 1200 MG/15ML
LIQUID ORAL AS NEEDED
Status: DISCONTINUED | OUTPATIENT
Start: 2022-06-23 | End: 2022-06-23 | Stop reason: HOSPADM

## 2022-06-23 RX ORDER — ONDANSETRON 2 MG/ML
INJECTION INTRAMUSCULAR; INTRAVENOUS AS NEEDED
Status: DISCONTINUED | OUTPATIENT
Start: 2022-06-23 | End: 2022-06-23

## 2022-06-23 RX ORDER — SENNOSIDES 8.6 MG
650 CAPSULE ORAL EVERY 8 HOURS PRN
Qty: 30 TABLET | Refills: 0 | Status: SHIPPED | OUTPATIENT
Start: 2022-06-23

## 2022-06-23 RX ORDER — ONDANSETRON 2 MG/ML
4 INJECTION INTRAMUSCULAR; INTRAVENOUS ONCE AS NEEDED
Status: DISCONTINUED | OUTPATIENT
Start: 2022-06-23 | End: 2022-06-23 | Stop reason: HOSPADM

## 2022-06-23 RX ORDER — FENTANYL CITRATE/PF 50 MCG/ML
50 SYRINGE (ML) INJECTION
Status: DISCONTINUED | OUTPATIENT
Start: 2022-06-23 | End: 2022-06-23 | Stop reason: HOSPADM

## 2022-06-23 RX ORDER — PROPOFOL 10 MG/ML
INJECTION, EMULSION INTRAVENOUS CONTINUOUS PRN
Status: DISCONTINUED | OUTPATIENT
Start: 2022-06-23 | End: 2022-06-23

## 2022-06-23 RX ORDER — FENTANYL CITRATE 50 UG/ML
INJECTION, SOLUTION INTRAMUSCULAR; INTRAVENOUS
Status: COMPLETED | OUTPATIENT
Start: 2022-06-23 | End: 2022-06-23

## 2022-06-23 RX ORDER — NAPROXEN SODIUM 220 MG
220 TABLET ORAL 2 TIMES DAILY WITH MEALS
Qty: 20 TABLET | Refills: 0 | Status: SHIPPED | OUTPATIENT
Start: 2022-06-23

## 2022-06-23 RX ORDER — KETAMINE HYDROCHLORIDE 100 MG/ML
INJECTION, SOLUTION INTRAMUSCULAR; INTRAVENOUS AS NEEDED
Status: DISCONTINUED | OUTPATIENT
Start: 2022-06-23 | End: 2022-06-23

## 2022-06-23 RX ORDER — LIDOCAINE HYDROCHLORIDE 10 MG/ML
INJECTION, SOLUTION EPIDURAL; INFILTRATION; INTRACAUDAL; PERINEURAL AS NEEDED
Status: DISCONTINUED | OUTPATIENT
Start: 2022-06-23 | End: 2022-06-23

## 2022-06-23 RX ORDER — MIDAZOLAM HYDROCHLORIDE 2 MG/2ML
INJECTION, SOLUTION INTRAMUSCULAR; INTRAVENOUS AS NEEDED
Status: DISCONTINUED | OUTPATIENT
Start: 2022-06-23 | End: 2022-06-23

## 2022-06-23 RX ORDER — HYDRALAZINE HYDROCHLORIDE 20 MG/ML
5 INJECTION INTRAMUSCULAR; INTRAVENOUS
Status: COMPLETED | OUTPATIENT
Start: 2022-06-23 | End: 2022-06-23

## 2022-06-23 RX ORDER — HYDROMORPHONE HCL/PF 1 MG/ML
0.5 SYRINGE (ML) INJECTION
Status: DISCONTINUED | OUTPATIENT
Start: 2022-06-23 | End: 2022-06-23 | Stop reason: HOSPADM

## 2022-06-23 RX ORDER — CEFAZOLIN SODIUM 2 G/50ML
SOLUTION INTRAVENOUS AS NEEDED
Status: DISCONTINUED | OUTPATIENT
Start: 2022-06-23 | End: 2022-06-23

## 2022-06-23 RX ORDER — MIDAZOLAM HYDROCHLORIDE 2 MG/2ML
INJECTION, SOLUTION INTRAMUSCULAR; INTRAVENOUS
Status: COMPLETED | OUTPATIENT
Start: 2022-06-23 | End: 2022-06-23

## 2022-06-23 RX ORDER — DEXAMETHASONE SODIUM PHOSPHATE 10 MG/ML
INJECTION, SOLUTION INTRAMUSCULAR; INTRAVENOUS AS NEEDED
Status: DISCONTINUED | OUTPATIENT
Start: 2022-06-23 | End: 2022-06-23

## 2022-06-23 RX ORDER — PROPOFOL 10 MG/ML
INJECTION, EMULSION INTRAVENOUS AS NEEDED
Status: DISCONTINUED | OUTPATIENT
Start: 2022-06-23 | End: 2022-06-23

## 2022-06-23 RX ORDER — FENTANYL CITRATE 50 UG/ML
INJECTION, SOLUTION INTRAMUSCULAR; INTRAVENOUS AS NEEDED
Status: DISCONTINUED | OUTPATIENT
Start: 2022-06-23 | End: 2022-06-23

## 2022-06-23 RX ORDER — HYDROCODONE BITARTRATE AND ACETAMINOPHEN 5; 325 MG/1; MG/1
1 TABLET ORAL EVERY 6 HOURS PRN
Status: COMPLETED | OUTPATIENT
Start: 2022-06-23 | End: 2022-06-23

## 2022-06-23 RX ORDER — ROPIVACAINE HYDROCHLORIDE 5 MG/ML
INJECTION, SOLUTION EPIDURAL; INFILTRATION; PERINEURAL
Status: COMPLETED | OUTPATIENT
Start: 2022-06-23 | End: 2022-06-23

## 2022-06-23 RX ORDER — HYDROCODONE BITARTRATE AND ACETAMINOPHEN 5; 325 MG/1; MG/1
1 TABLET ORAL EVERY 6 HOURS PRN
Qty: 5 TABLET | Refills: 0 | Status: SHIPPED | OUTPATIENT
Start: 2022-06-23 | End: 2022-06-27 | Stop reason: SDUPTHER

## 2022-06-23 RX ORDER — LABETALOL HYDROCHLORIDE 5 MG/ML
10 INJECTION, SOLUTION INTRAVENOUS ONCE
Status: COMPLETED | OUTPATIENT
Start: 2022-06-23 | End: 2022-06-23

## 2022-06-23 RX ORDER — SODIUM CHLORIDE, SODIUM LACTATE, POTASSIUM CHLORIDE, CALCIUM CHLORIDE 600; 310; 30; 20 MG/100ML; MG/100ML; MG/100ML; MG/100ML
125 INJECTION, SOLUTION INTRAVENOUS CONTINUOUS
Status: DISCONTINUED | OUTPATIENT
Start: 2022-06-23 | End: 2022-06-23 | Stop reason: HOSPADM

## 2022-06-23 RX ADMIN — Medication 10 MG: at 17:01

## 2022-06-23 RX ADMIN — SODIUM CHLORIDE, SODIUM LACTATE, POTASSIUM CHLORIDE, AND CALCIUM CHLORIDE: .6; .31; .03; .02 INJECTION, SOLUTION INTRAVENOUS at 13:11

## 2022-06-23 RX ADMIN — PROPOFOL 100 MG: 10 INJECTION, EMULSION INTRAVENOUS at 14:09

## 2022-06-23 RX ADMIN — FENTANYL CITRATE 50 MCG: 50 INJECTION, SOLUTION INTRAMUSCULAR; INTRAVENOUS at 14:26

## 2022-06-23 RX ADMIN — MIDAZOLAM 2 MG: 1 INJECTION INTRAMUSCULAR; INTRAVENOUS at 12:33

## 2022-06-23 RX ADMIN — PROPOFOL 120 MCG/KG/MIN: 10 INJECTION, EMULSION INTRAVENOUS at 14:01

## 2022-06-23 RX ADMIN — KETAMINE HYDROCHLORIDE 30 MG: 100 INJECTION INTRAMUSCULAR; INTRAVENOUS at 14:03

## 2022-06-23 RX ADMIN — FENTANYL CITRATE 50 MCG: 50 INJECTION, SOLUTION INTRAMUSCULAR; INTRAVENOUS at 12:33

## 2022-06-23 RX ADMIN — DEXAMETHASONE SODIUM PHOSPHATE 10 MG: 10 INJECTION, SOLUTION INTRAMUSCULAR; INTRAVENOUS at 14:41

## 2022-06-23 RX ADMIN — ONDANSETRON 4 MG: 2 INJECTION INTRAMUSCULAR; INTRAVENOUS at 14:56

## 2022-06-23 RX ADMIN — LIDOCAINE HYDROCHLORIDE 30 MG: 10 INJECTION, SOLUTION EPIDURAL; INFILTRATION; INTRACAUDAL; PERINEURAL at 14:01

## 2022-06-23 RX ADMIN — ROPIVACAINE HYDROCHLORIDE 25 ML: 5 INJECTION, SOLUTION EPIDURAL; INFILTRATION; PERINEURAL at 12:34

## 2022-06-23 RX ADMIN — HYDRALAZINE HYDROCHLORIDE 5 MG: 20 INJECTION INTRAMUSCULAR; INTRAVENOUS at 16:47

## 2022-06-23 RX ADMIN — HYDROCODONE BITARTRATE AND ACETAMINOPHEN 1 TABLET: 5; 325 TABLET ORAL at 16:56

## 2022-06-23 RX ADMIN — HYDRALAZINE HYDROCHLORIDE 5 MG: 20 INJECTION INTRAMUSCULAR; INTRAVENOUS at 16:34

## 2022-06-23 RX ADMIN — CEFAZOLIN SODIUM 2000 MG: 2 SOLUTION INTRAVENOUS at 14:02

## 2022-06-23 NOTE — H&P
H&P Exam - Orthopedics   Geoffrey Cuellar 63 y o  female MRN: [id number]  Unit/Bed#: [facility] 07    Assessment/Plan   Assessment:  Flexor tendonitis secondary to orthopedic implant  Right thumb FPL tendon rupture    Plan:  Removal of hardware right distal radius  Right ring finger flexor digitorum superficialis to FPL tendon transfer    History of Present Illness   HPI:  Geoffrey Cuellar is a 63 y o  female who presents with stiffness and loss of motion to the right thumb  She also has significant inflammation swelling of the right wrist   Patient has a history of a fall from March 202, and a right distal radius fracture treated with an Open Reduction and Internal Fixation in October 2021      Historical Information  Review Of Systems:   · Skin: Normal  · Neuro: See HPI  · Musculoskeletal: See HPI  · 14 point review of systems negative except as stated above     Past Medical History:   Past Medical History:   Diagnosis Date    Anxiety     Anxiety disorder     At risk for falls     Bipolar 2 disorder ([address] )     Chronic back pain     Chronic kidney disease     Closed fracture of distal end of right fibula with routine healing 11/04/2020    COVID-19     in Jan 2021    CVA ([facility] vascular accident[facility])     noted on MRI in the past    Depression     GERD (gastroesophageal reflux disease)     Hypercholesteremia     Hypernatremia     Hypertension     Hypokalemia     Idiopathic chronic pancreatitis ([address] ) 03/20/2018    Intervertebral disc disorder with radiculopathy of lumbosacral region     resolved: 12/28/2015    Kidney disease     Limb alert care status     LUE-fistula    Panic attacks     Pericardial effusion     PONV (postoperative nausea and vomiting)     Radiculitis     resolved: 12/28/2015    Secondary renal hyperparathyroidism [facility] )     Vitamin D deficiency        Past Surgical History:   Past Surgical History:   Procedure Laterality Date    BUNIONECTOMY      Left foot     COLON SURGERY      COLONOSCOPY  2021    DILATION AND CURETTAGE OF UTERUS      INDUCED       surgically induced    [place] ANASTOMOSIS,AV,ANY SITE Left 2019    Procedure: CREATION FISTULA ARTERIOVENOUS (AV) left wrists possible left upper;  Surgeon: Gustavo Kuhn MD;  Location[facility];  Service: Vascular    [facility] Selam Bougie Left 2019    Procedure: Alejandro Kt;  Surgeon: Katherin Prakash DPM;  Location: [facility];  Service: [address] [place] [address] W/[facility] W/DIST Selam Bougie Right 8/3/2020    Procedure: Lizzette Foy;  Surgeon: Katherin Prakash DPM;  Location: [facility];  Service: Podiatry    [place] [facility] W/[facility] Jodelle Naegeli PHLNX OSTEOT Right 2021    Procedure: Clance People, right tameka osteotomy and 2nd claw toe correction;  Surgeon: Stevphen Ganser, MD;  Location: [facility];  Service: Orthopedics    NJ ERCP DX COLLECTION SPECIMEN BRUSHING/WASHING N/A 2018    Procedure: ENDOSCOPIC RETROGRADE CHOLANGIOPANCREATOGRAPHY (ERCP);   Surgeon: Herminio Lr MD;  Location: [facility];  Service: Gastroenterology    NJ LAP, SURG PROCTOPEXY N/A 2018    Procedure: ROBOTIC SIGMOID RESECTION / RECTOPEXY;  Surgeon: Omar Mays MD;  Location: [facility] MA[facility] OR;  Service: Colorectal    NJ OPEN TREATMENT RADIAL SHAFT FRACTURE Right 10/11/2021    Procedure: OPEN REDUCTION W/ INTERNAL FIXATION (ORIF) RADIUS (WRIST), RIGHT DISTAL;  Surgeon: Katia Walden MD;  [facility];  Service: Orthopedics    NJ SIGMOIDOSCOPY FLX DX W/COLLJ SPEC BR/WA IF PFRMD N/A 2018    Procedure: Zenaida Mejia;  Surgeon: Omar Mays MD;  Location: [facility];  Service: Colorectal    TUBAL LIGATION Bilateral [facility] THYROID BIOPSY  2019       Family History:  Family history reviewed and non-contributory  Family History   Problem Relation Age of Onset    Bipolar disorder Mother     Mental illness Mother depression    Stroke Mother     Dementia Mother     Colon polyps Mother     Heart disease Father     Hypertension Father     Diabetes Father     Other Family         Back disorder    Diabetes Family     Heart disease Family     Hypertension Family     Stroke Family     Thyroid disease Family     Breast cancer Paternal Grandmother         age unknown   Osker Ok Breast cancer Paternal Deveron          age unknown   Osker Ok Breast cancer Maternal Aunt         age unknown    Mental illness Sister     Colon polyps Sister     Mental illness Sister     Heart disease Sister     No Known Problems Sister     Breast cancer Sister 76    Other Son         pituitary tumor    Hypertension Son     Obesity Son     No Known Problems Son     No Known Problems Maternal Grandmother     No Known Problems Maternal Grandfather     No Known Problems Paternal Grandfather     Breast cancer Paternal Aunt         age unknown    Substance Abuse Neg Hx         neg fam hx    Colon cancer Neg Hx        Social History:  Social History     Socioeconomic History    Marital status:      Spouse name: None    Number of children: None    Years of education: None    Highest education level: None   Occupational History    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   Tobacco Use    Smoking status: Never Smoker    Smokeless tobacco: Never Used   Vaping Use    Vaping Use: Never used   Substance and Sexual Activity    Alcohol use: Never    Drug use: Not Currently    Sexual activity: Not Currently   Other Topics Concern    None   Social History Narrative    Daily caffeine consumption 2-3 servings a day    Lives with family  No living will  Has dentures---no dental care  Primary language--English  Feels safe at home       Social Determinants of Health     Financial Resource Strain: Not on file   Food Insecurity: Not on file   Transportation Needs: Not on file   Physical Activity: Not on file   Stress: Not on file   Social Connections: Not on file   Intimate Partner Violence: Not on file   Housing Stability: Not on file       Allergies: Allergies   Allergen Reactions    Pollen Extract Nasal Congestion           Labs:  0   Lab Value Date/Time    HCT 36 0 03/04/2022 0947    HCT 38 7 12/30/2021 1723    HCT 39 3 12/16/2021 0702    HCT 34 7 (L) 02/27/2017 0758    HGB 11 4 (L) 03/04/2022 0947    HGB 12 3 12/30/2021 1723    HGB 12 3 12/16/2021 0702    HGB 11 0 (L) 02/27/2017 0758    INR 0 97 12/29/2020 1630    WBC 5 82 03/04/2022 0947    WBC 8 01 12/30/2021 1723    WBC 6 48 12/16/2021 0702    WBC 5 2 02/27/2017 0758    CRP 6 0 (H) 02/05/2021 0854       Meds:    Current Facility-Administered Medications:     lactated ringers infusion, 125 mL/hr, Intravenous, Continuous, Jimi Mata MD    Blood Culture:   No results found for: BLOODCX    Wound Culture:   No results found for: WOUNDCULT    Ins and Outs:  No intake/output data recorded  Physical Exam  BP (!) 190/84   Pulse 98   Temp 97 8 °F (36 6 °C) (Temporal)   Resp 18   Ht 5' 7" (1 702 m)   Wt 97 9 kg (215 lb 12 8 oz)   LMP  (LMP Unknown)   SpO2 96%   BMI 33 80 kg/m²   BP (!) 190/84   Pulse 98   Temp 97 8 °F (36 6 °C) (Temporal)   Resp 18   Ht 5' 7" (1 702 m)   Wt 97 9 kg (215 lb 12 8 oz)   LMP  (LMP Unknown)   SpO2 96%   BMI 33 80 kg/m²   Gen: Alert and oriented to person, place, time  HEENT: EOMI, eyes clear, moist mucus membranes, hearing intact  Respiratory: Bilateral chest rise  No audible wheezing found  Cardiovascular: Regular Rate and Rhythm  Abdomen: soft nontender/nondistended  Ortho Exam:  Crepitation over distal radius plate with finger flexion  No active thumb flexion    Neuro Exam:  Sensation intact    Lab Results: Reviewed  Imaging: Reviewed

## 2022-06-23 NOTE — ANESTHESIA PROCEDURE NOTES
Peripheral Block    Patient location during procedure: holding area  Start time: 6/23/2022 12:33 PM  Reason for block: at surgeon's request and post-op pain management  Staffing  Performed: CRNA   Anesthesiologist: Jimi Mata MD  Resident/CRNA: Temi Blackwood CRNA  Preanesthetic Checklist  Completed: patient identified, IV checked, site marked, risks and benefits discussed, surgical consent, monitors and equipment checked, pre-op evaluation and timeout performed  Peripheral Block  Patient position: supine  Prep: ChloraPrep  Patient monitoring: heart rate, cardiac monitor, continuous pulse ox and frequent blood pressure checks  Block type: supraclavicular  Laterality: right  Injection technique: single-shot  Procedures: ultrasound guided, Ultrasound guidance required for the procedure to increase accuracy and safety of medication placement and decrease risk of complications  Ultrasound permanent image savedropivacaine (NAROPIN) 0 5 % - Perineural   25 mL - 6/23/2022 12:34:00 PM  fentaNYL 50 mcg/mL - Intravenous   50 mcg - 6/23/2022 12:33:00 PM  midazolam (VERSED) 2 mg/2 mL - Intravenous   2 mg - 6/23/2022 12:33:00 PM  Needle  Needle type: Stimuplex   Needle gauge: 22 G  Needle length: 2 cm    Needle localization: ultrasound guidance  Needle insertion depth: 1 cm  Test dose: negative  Assessment  Injection assessment: incremental injection, local visualized surrounding nerve on ultrasound, negative aspiration for heme and no paresthesia on injection  Paresthesia pain: none  Heart rate change: no  Slow fractionated injection: yes  Post-procedure:  site cleaned  patient tolerated the procedure well with no immediate complications  Additional Notes  See PACS for ultrasound image

## 2022-06-23 NOTE — OP NOTE
OPERATIVE REPORT  PATIENT NAME: Geoffrey Cuellar  :  1960  MRN: [id number]  Pt Location:  MAIN OR    SURGERY DATE: 22    Surgeon(s) and Role:     * Estella Sepulveda MD - Primary     * Sury Galindo PA-C - Assisting    Pre-Op Diagnosis:  Rupture of flexor pollicis longus muscle [K20 742Y]  Painful orthopaedic hardware ([address] ) [T84 84XA]    Post-Op Diagnosis:    Rupture of flexor pollicis longus muscle [F05 125R]  Painful orthopaedic hardware ([address] ) [T84 84XA]    Procedure(s) (LRB):  Removal of hardware volar aspect right distal radius (distal radial plate and screws) (Right)  Right ring finger flexor digitorum superficialis to flexor pollicis longus tendon transfer (Right)  Application short-arm thumb spica splint (Right)    Specimen(s):  * No orders in the log *    Estimated Blood Loss:   Minimal      Anesthesia Type:   General    Operative Indications: The patient has a history of painful orthopedic hardware to the right wrist with a flexor pollicis longus tendon rupture  The decision was made to bring the patient to the operating room for hardware removal from the right wrist flexor digitorum superficialis of the ring the flexor pollicis longus tendon transfer  Risks of the procedure were explained which include, but are not limited to bleeding; infection; damage to nerves, arteries,veins, tendons; scar; pain; need for reoperation; failure to give desired result; and risks of anaesthesia  All questions were answered to satisfaction and they were willing to proceed  Operative Findings:  Complete rupture flexor pollicis longus tendon  Healed distal radius fracture on the right    Complications:   None    Procedure and Technique:  After the patient, site, and procedure were identified, the patient was brought into the operating room in a supine position  Regional and general anaesthesia were provided  A well padded tourniquet was applied to the extremity, set at 250 mmHg  The  right upper extremity was then prepped and drapped in a normal, sterile, orthopedic fashion  After the patient, site, and procedure identified attention was turned towards the right arm  An Esmarch bandage was used to exsanguinate the limb the tourniquet was inflated to 250 mmHg  We approached the right volar distal radius  We dissected down through the skin and subcutaneous tissues over standard trans volar FCR approach that was used previously  Care was taken to protect the median nerve, palmar cutaneous branch of the median nerve, radial artery, and superficial sensory branch of the radial nerve  We meticulously dissected to protect radial retracting the muscle belly of the flexor pollicis longus without undergone some fatty atrophy  Pronator quadratus was then opened and reflected ulnarly  This brought us down to the level of the distal radial plate  Six distal screws and 3 screws in the shaft were then removed in their entirety and the plate was removed in its entirety  Radiographs were taken in the operating room which confirmed complete excision of the plate  Attention was then turned towards the tendon transfer area  We dissected over and identified the flexor digitorum superficialis tendon to the ring finger in the forearm  We then made an incision over the distal aspect of the palm just at the edge of the A1 pulley to the ring finger  We protected the radial and ulnar arteries and nerves  Flexor digitorum superficialis was identified and the profundus was identified and confirmed to be intact  Superficialis was then transected allowing 1 cm of tendon to remain intact proximal to Camper's chiasm  Flexor digitorum superficialis was then brought back into the forearm wound  Separate incision was then made in a Adri shaped fashion near the A1 pulley of the thumb  We dissected down through skin and subcutaneous tissues protecting the radial and ulnar digital arteries and nerves    We identified the remnant of the flexor pollicis longus tendon  We then found the tunnel that the previous flexor pollicis longus had run in  Flexor digitorum superficialis was then tunneled deep to the median nerve and through the previous tunnel of the flexor pollicis longus  This was then brought back to a separate incision that had been made over the volar aspect of the right thumb  At this point, wrist was held in position as the flexor digitorum superficialis of the ring finger was then secured with a Pulvertaft weave x3 through the flexor pollicis longus tendon  This was secured with 3-0 Ethibond in interrupted fashion x3  Tension was checked and confirmed to be appropriate with tenodesis  We were satisfied with this procedure  Tourniquet was deflated  At the completion of the procedure, hemostasis was obtained with cautery and direct pressure  The wounds were copiously irrigated with sterile solution  The wounds were closed with Vicryl and Prolene  Sterile dressings were applied, including Xeroform, gauze, tweeners, webril, ACE and Thumb Spica Splint  Please note, all sponge, needle, and instrument counts were correct prior to closure  Loupe magnification was utilized  The patient tolerated the procedure well       I was present for all critical portions of the procedure, A qualified resident physician was not available and A physician assistant was required during the procedure for retraction tissue handling,dissection and suturing    Patient Disposition:  PACU , hemodynamically stable and extubated and stable    SIGNATURE: Juan Francisco Soto MD  DATE: 06/23/22  TIME: 2:00 PM

## 2022-06-23 NOTE — ANESTHESIA POSTPROCEDURE EVALUATION
Post-Op Assessment Note    CV Status:  Stable    Pain management: adequate     Mental Status:  Alert and awake   Hydration Status:  Euvolemic   PONV Controlled:  Controlled   Airway Patency:  Patent      Post Op Vitals Reviewed: Yes      Staff: CRNA         No complications documented      BP  214/100   Temp   97 8   Pulse  92   Resp   15   SpO2   98%

## 2022-06-23 NOTE — ANESTHESIA PREPROCEDURE EVALUATION
Procedure:  REMOVAL HARDWARE right distal radius (Right Finger)  Right ring finger flexor digitorum superficialis to flexor pollicis longus tendon transfer (Right Arm Lower)    Relevant Problems   CARDIO   (+) Accelerated hypertension   (+) Cardiac murmur   (+) Hypercholesteremia   (+) Primary hypertension      ENDO   (+) Hyperparathyroidism (HCC)   (+) Secondary renal hyperparathyroidism (HCC)      GI/HEPATIC   (+) GERD (gastroesophageal reflux disease)   (+) Idiopathic chronic pancreatitis (HCC)      /RENAL   (+) CKD (chronic kidney disease) stage 4, GFR 15-29 ml/min (HCC)   (+) End stage renal disease (HCC)   (+) Renal cyst      MUSCULOSKELETAL   (+) Bilateral sacroiliitis (HCC)   (+) Primary osteoarthritis of left knee   (+) Primary osteoarthritis of right knee      NEURO/PSYCH   (+) Anxiety   (+) Obsessive compulsive disorder   (+) Panic disorder with agoraphobia      PULMONARY   (+) Moderate persistent asthma without complication      BMI 35  Hematoma to left forehead after falling went to ER for Positive HA, Positive contusion of left forehead  Cleared by PCP on 5/27  Pain regimen: clonazapam, tramadol, vicodin    Pt has an AV fistula created on left arm  She may start dialysis in the near future      Physical Exam    Airway    Mallampati score: II  TM Distance: >3 FB  Neck ROM: full     Dental   upper dentures and lower dentures,     Cardiovascular      Pulmonary      Other Findings        Anesthesia Plan  ASA Score- 3     Anesthesia Type- IV sedation with anesthesia with ASA Monitors  Additional Monitors:   Airway Plan:     Comment: IV placement on foot, antiemetics, GA with LMA  Plan Factors-    Chart reviewed  Patient summary reviewed  Patient is not a current smoker  Patient did not smoke on day of surgery  Induction- intravenous  Postoperative Plan-     Informed Consent- Anesthetic plan and risks discussed with patient  I personally reviewed this patient with the CRNA  Discussed and agreed on the Anesthesia Plan with the DIVINE Hernandez

## 2022-06-23 NOTE — DISCHARGE INSTRUCTIONS
Post Operative Instructions    You have had surgery on your arm today, please read and follow the information below:  Elevate your hand above your elbow during the next 24-48 hours to help with swelling  Place your hand and arm over your head with motion at your shoulder three times a day  Do not apply any cream/ointment/oil to your incisions including antibiotics  Do not soak your hands in standing water (dishwater, tubs, Jacuzzi's, pools, etc ) until given permission (typically 2-3 weeks after injury)    Call the office if you notice any:  Increased numbness or tingling of your hand or fingers that is not relieved with elevation  Increasing pain that is not controlled with medication  Difficulty chewing, breathing, swallowing  Chest pains or shortness of breath  Fever over 101 4 degrees  Bandage: Do NOT remove bandage until follow-up appointment  Motion: Move fingers into a fist 5 times a day, DO NOT move any splinted fingers  Weight bearing status: The operated extremity should be non-weight bearing until further notice  Ice: Ice for 10 minutes every hour as needed for swelling x 24 hours  Sling: Sling for comfort for 2-3 days  Medications:   Naproxen 220 mg two times a day   Tylenol Extended Release 650 mg every 8 hours  Norco/Hydrocodone one tab every 6 hours AS NEEDED for pain     Follow-up Appointment: 7-10 days  Please call the office if you have any questions or concerns regarding your post-operative care

## 2022-06-27 ENCOUNTER — TELEPHONE (OUTPATIENT)
Dept: OBGYN CLINIC | Facility: HOSPITAL | Age: 62
End: 2022-06-27

## 2022-06-27 DIAGNOSIS — S99.921A INJURY OF PLANTAR PLATE, RIGHT, INITIAL ENCOUNTER: ICD-10-CM

## 2022-06-27 DIAGNOSIS — M20.5X1 CLAW TOE, ACQUIRED, RIGHT: ICD-10-CM

## 2022-06-27 DIAGNOSIS — T84.84XA PAINFUL ORTHOPAEDIC HARDWARE (HCC): ICD-10-CM

## 2022-06-27 DIAGNOSIS — M20.11 ACQUIRED HALLUX INTERPHALANGEUS, RIGHT: ICD-10-CM

## 2022-06-27 RX ORDER — HYDROCODONE BITARTRATE AND ACETAMINOPHEN 5; 325 MG/1; MG/1
1 TABLET ORAL EVERY 6 HOURS PRN
Qty: 10 TABLET | Refills: 0 | Status: SHIPPED | OUTPATIENT
Start: 2022-06-27 | End: 2022-07-02

## 2022-06-27 RX ORDER — NALOXONE HYDROCHLORIDE 4 MG/.1ML
SPRAY NASAL
Qty: 1 EACH | Refills: 1 | Status: SHIPPED | OUTPATIENT
Start: 2022-06-27

## 2022-06-27 RX ORDER — ONDANSETRON 4 MG/1
4 TABLET, FILM COATED ORAL EVERY 8 HOURS PRN
Qty: 60 TABLET | Refills: 1 | Status: SHIPPED | OUTPATIENT
Start: 2022-06-27

## 2022-06-27 NOTE — TELEPHONE ENCOUNTER
Pt contacted Call Center requested refill of their medication  Medication Name:Hydrocodone      Dosage of Med:5mg    Frequency of Med:every 6 hrs for pain      Remaining Medication:0      Pharmacy and Shellie Child [address]  [address]  [facility] , [address] [place] [address]   Phone:  [phone]  Fax:  [phone]         Pt   Preferred Callback Phone Number:[phone]

## 2022-06-28 ENCOUNTER — TELEPHONE (OUTPATIENT)
Dept: GASTROENTEROLOGY | Facility: CLINIC | Age: 62
End: 2022-06-28

## 2022-06-28 NOTE — TELEPHONE ENCOUNTER
Pt requesting delivery of Linzess to be ordered to the office; also would like samples if avail  # [phone]

## 2022-06-28 NOTE — TELEPHONE ENCOUNTER
Patient sees Dr Yue Can  Patient is returning a call she received, she had surgery last Thursday and is following up with a call back from her surgery      CB # [phone]

## 2022-06-28 NOTE — TELEPHONE ENCOUNTER
Message left for patient she needs to call company directly for meds  Also advised we have no samples

## 2022-06-29 ENCOUNTER — TELEPHONE (OUTPATIENT)
Dept: OBGYN CLINIC | Facility: HOSPITAL | Age: 62
End: 2022-06-29

## 2022-06-29 NOTE — TELEPHONE ENCOUNTER
Patient called needing splint check, nothing available in [facility] per office  Patient did not want to travel so she stated she will just shift it and come in on PO already scheduled

## 2022-07-05 DIAGNOSIS — M79.601 PAIN OF RIGHT UPPER EXTREMITY: ICD-10-CM

## 2022-07-05 RX ORDER — TRAMADOL HYDROCHLORIDE 50 MG/1
50 TABLET ORAL EVERY 8 HOURS PRN
Qty: 30 TABLET | Refills: 0 | Status: SHIPPED | OUTPATIENT
Start: 2022-07-05 | End: 2022-07-18 | Stop reason: SDUPTHER

## 2022-07-05 NOTE — TELEPHONE ENCOUNTER
Pt calling to request Linzess  She did not rec mssg last wk to call for med and that we do not have samples

## 2022-07-05 NOTE — TELEPHONE ENCOUNTER
[place] [facility]:Telephone [phone] follow prompts   I called to check status of Linzess 290 mcg; shipped last in April  Nothing in process at this time  I requested an order for Linzess be processed  Refills remainin     I requested to speak with a patient assistance counselor regarding refills  I spoke with intake representative Fawad Acuna  Patients enrollment is valid until   The current order is in process  EQ#[id number], will arrive to our office in 7-10 business days  Send in written order Page #2 prescribers page write "Luan Trevino to ship to patients home address"  or prescription can be sent electronically to   [facility] [phone]    I was advised that the prescribers office is to wait until  to refill  The pharmacy  only have 30 days to process       Pharmacy information added to pharmacy preferences list     Write in comments section "Ok to ship to patients home address "

## 2022-07-07 ENCOUNTER — OFFICE VISIT (OUTPATIENT)
Dept: OBGYN CLINIC | Facility: CLINIC | Age: 62
End: 2022-07-07

## 2022-07-07 VITALS — BODY MASS INDEX: 33.74 KG/M2 | WEIGHT: 215 LBS | HEIGHT: 67 IN

## 2022-07-07 DIAGNOSIS — Z47.89 AFTERCARE FOLLOWING SURGERY OF THE MUSCULOSKELETAL SYSTEM: Primary | ICD-10-CM

## 2022-07-07 PROCEDURE — 99024 POSTOP FOLLOW-UP VISIT: CPT | Performed by: PHYSICIAN ASSISTANT

## 2022-07-07 NOTE — PROGRESS NOTES
Orthopaedic Surgery - Office Note  Jersey Gonzales (15 y o  female)   : 1960   MRN: [id number]  Encounter Date: 2022    Chief Complaint   Patient presents with    Right Hand - Post-op         Assessment/Plan  Diagnoses and all orders for this visit:    Aftercare following surgery-Removal of hardware volar aspect right distal radius, Right ring finger FDS to FPL tendon transfer  -     Ambulatory Referral to Occupational Therapy; Future  -     Durable Medical Equipment    Patient will avoid submerging the incisions for the next 48 hours  She will keep them clean and dry and watch for signs of infection calling if they develop  She will follow all postoperative instructions from the attending surgeon  She will wear the thumb spica brace  Return in 3 weeks with Dr Nitesh Mckenna  History of Present Illness  Patient is status post:  Removal of hardware volar aspect right distal radius (distal radial plate and screws) (Right Finger), Right ring finger flexor digitorum superficialis to flexor pollicis longus tendon transfer (Right Arm Lower),  Application short-arm thumb spica splint (Right ) on 22 with Dr Nitesh Mckenna  Patient reports the pain is controlled  No new symptoms are reported  She denies any postoperative complications  She reports she is no longer requiring any pain medications  Review of Systems  Pertinent items are noted in HPI  All other systems were reviewed and are negative  Physical Exam  LMP  (LMP Unknown)   Cons: Appears well  No apparent distress  Psych: Alert  Oriented x3  Mood and affect normal   Eyes: PERRLA, EOMI  Resp: Normal effort  No audible wheezing or stridor  CV: Palpable pulse  No discernable arrhythmia  Lymph:  No palpable cervical, axillary, or inguinal lymphadenopathy  Skin: Warm  No palpable masses  No visible lesions  Neuro: Normal muscle tone  Normal and symmetric DTR's       Patient's right wrist, thumb and ring finger incisions are healing well  Sutures were removed  There are no signs infection  There is no active drainage  Neurovascularly she is grossly intact without deficit appreciated  Strength and range of motion were not assessed today  Studies Reviewed  Operative report and office notes were reviewed by myself in the office today    Intraoperative C-arm x-rays were reviewed  Procedures  No procedures today  Medical, Surgical, Family, and Social History  The patient's medical history, family history, and social history, were reviewed and updated as appropriate      Past Medical History:   Diagnosis Date    Anxiety     Anxiety disorder     At risk for falls     Bipolar 2 disorder (HCC)     Chronic back pain     Chronic kidney disease     Closed fracture of distal end of right fibula with routine healing 2020    COVID-19     in 2021    CVA [facility]cere[facility] vascular accident[facility])     noted on MRI in the past    Depression     GERD (gastroesophageal reflux disease)     Hypercholesteremia     Hypernatremia     Hypertension     Hypokalemia     Idiopathic chronic pancreatitis ([facility] [address] ) 2018    Intervertebral disc disorder with radiculopathy of lumbosacral region     resolved: 2015    Kidney disease     Limb alert care status     LUE-fistula    Panic attacks     Pericardial effusion     PONV (postoperative nausea and vomiting)     Radiculitis     resolved: 2015    Secondary renal hyperparathyroidism ([facility] [address] )     Vitamin D deficiency        Past Surgical History:   Procedure Laterality Date    BUNIONECTOMY      Left foot     CAST APPLICATION Right     Procedure: Application short-arm thumb spica splint;  Surgeon: Sally Cheatham MD;  Location:  MAIN OR;  Service: Orthopedics    COLON SURGERY      COLONOSCOPY  2021    DILATION AND CURETTAGE OF UTERUS      INDUCED       surgically induced    NH ANASTOMOSIS,AV,ANY SITE Left 2019    Procedure: CREATION FISTULA ARTERIOVENOUS (AV) left wrists possible left upper;  Surgeon: Day Lyons MD;  [facility];  Service: Vascular    AR [place] W/SESMDC W/DIST Earnesteen Crooked Left 7/1/2019    Procedure: Logan Gracia;  Surgeon: Devante Bauer DPM;  [facility];  Service: [address] W/SESMDC W/DIST Earnesteen Crooked Right 8/3/2020    Procedure: Rosalinda Saini;  Surgeon: Devante Bauer DPM;  [facility];  Service: Podiatry    AR [place] W/SESMDC Pat Starring PHLNX OSTEOT Right 9/27/2021    Procedure: Rebecca Ramirez, right tameka osteotomy and 2nd claw toe correction;  Surgeon: Lavonne Núñez MD;  [facility];  Service: Orthopedics    AR ERCP DX COLLECTION SPECIMEN BRUSHING/WASHING N/A 4/11/2018    Procedure: ENDOSCOPIC RETROGRADE CHOLANGIOPANCREATOGRAPHY (ERCP); Surgeon: Saad Gaines MD;  Location: [facility];  Service: Gastroenterology    AR FINGER TENDON JJFLOR,8-1 FINGRS Right 6/23/2022    Procedure: Right ring finger flexor digitorum superficialis to flexor pollicis longus tendon transfer;  Surgeon: Miladis Monterroso MD;  [facility];  Service: Orthopedics    AR LAP, SURG PROCTOPEXY N/A 7/13/2018    Procedure: ROBOTIC SIGMOID RESECTION / RECTOPEXY;  Surgeon: Leti Navarrete MD;  [facility];  Service: Colorectal    AR OPEN TREATMENT RADIAL SHAFT FRACTURE Right 10/11/2021    Procedure: OPEN REDUCTION W/ INTERNAL FIXATION (ORIF) RADIUS (WRIST), RIGHT DISTAL;  Surgeon: Mamie Scott MD;  [facility];  Service: Orthopedics    AR REMOVAL DEEP IMPLANT Right 6/23/2022    Procedure: Removal of hardware volar aspect right distal radius (distal radial plate and screws);   Surgeon: Miladis Monterroso MD;  [facility];  Service: Orthopedics    AR SIGMOIDOSCOPY FLX DX W/COLLJ SPEC BR/WA IF PFRMD N/A 7/13/2018    Procedure: Andrés Hunt;  Surgeon: Leti Navarrete MD;  [facility];  Service: Colorectal    TUBAL LIGATION Bilateral 1997    US GUIDED THYROID BIOPSY  7/30/2019       Family History   Problem Relation Age of Onset    Bipolar disorder Mother     Mental illness Mother         depression    Stroke Mother     Dementia Mother     Colon polyps Mother     Heart disease Father     Hypertension Father     Diabetes Father     Other Family         Back disorder    Diabetes Family     Heart disease Family     Hypertension Family     Stroke Family     Thyroid disease Family     Breast cancer Paternal [de-identified]         age unknown   Shannen Clunes Breast cancer Paternal [de-identified]         age unknown   Shannen Clunes Breast cancer Maternal Aunt         age unknown    Mental illness Sister     Colon polyps Sister     Mental illness Sister     Heart disease Sister     No Known Problems Sister     Breast cancer Sister 76    Other Son         pituitary tumor    Hypertension Son     Obesity Son     No Known Problems Son     No Known Problems Maternal Grandmother     No Known Problems Maternal Grandfather     No Known Problems Paternal Grandfather     Breast cancer Paternal Aunt         age unknown    Substance Abuse Neg Hx         neg fam hx    Colon cancer Neg Hx        Social History     Occupational History    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   Tobacco Use    Smoking status: Never Smoker    Smokeless tobacco: Never Used   Vaping Use    Vaping Use: Never used   Substance and Sexual Activity    Alcohol use: Never    Drug use: Not Currently    Sexual activity: Not Currently       Allergies   Allergen Reactions    Pollen Extract Nasal Congestion         Current Outpatient Medications:     acetaminophen (TYLENOL) 650 mg CR tablet, Take 1 tablet (650 mg total) by mouth every 8 (eight) hours as needed for mild pain, Disp: 30 tablet, Rfl: 0    albuterol (Ventolin HFA) 90 mcg/act inhaler, Inhale 2 puffs every 6 (six) hours as needed for wheezing or shortness of breath, Disp: 8 5 g, Rfl: 3    AMILoride 5 mg tablet, Take 1 tablet (5 mg total) by mouth 2 (two) times a day, Disp: 180 tablet, Rfl: 3    aspirin 81 mg chewable tablet, Chew 1 tablet (81 mg total) daily, Disp: , Rfl:     carvedilol (COREG) 25 mg tablet, Take 1 tablet (25 mg total) by mouth 2 (two) times a day with meals, Disp: 180 tablet, Rfl: 3    cholecalciferol (VITAMIN D3) 1,000 units tablet, Take 5 tablets (5,000 Units total) by mouth daily, Disp: 90 tablet, Rfl: 5    clonazePAM (KlonoPIN) 0 5 mg tablet, Take 1 tablet (0 5 mg total) by mouth 3 (three) times a day, Disp: 270 tablet, Rfl: 0    fluvoxaMINE (LUVOX) 100 mg tablet, 2 (two) times a day 1 tab am , 2 tabs HS, Disp: , Rfl:     lamoTRIgine (LaMICtal) 200 MG tablet, Take 200 mg by mouth 2 (two) times a day , Disp: , Rfl:     linaCLOtide (Linzess) 290 MCG CAPS, Take 1 capsule by mouth daily, Disp: 90 capsule, Rfl: 0    naloxone (NARCAN) 4 mg/0 1 mL nasal spray, Administer 1 spray into a nostril   If no response after 2-3 minutes, give another dose in the other nostril using a new spray , Disp: 1 each, Rfl: 1    naproxen sodium (ALEVE) 220 MG tablet, Take 1 tablet (220 mg total) by mouth 2 (two) times a day with meals, Disp: 20 tablet, Rfl: 0    omeprazole (PriLOSEC) 40 MG capsule, Take 1 capsule (40 mg total) by mouth daily before breakfast, Disp: 90 capsule, Rfl: 3    ondansetron (ZOFRAN) 4 mg tablet, Take 1 tablet (4 mg total) by mouth every 8 (eight) hours as needed for nausea or vomiting, Disp: 60 tablet, Rfl: 1    Polyethylene Glycol 3350 (MIRALAX PO), Take by mouth as needed , Disp: , Rfl:     QUEtiapine (SEROquel) 100 mg tablet, Take 1 tablet by mouth daily at bedtime 100mg with 400mg bedtime , Disp: , Rfl:     QUEtiapine (SEROquel) 300 mg tablet, Take 300 mg by mouth in the morning 300mg in morning , Disp: , Rfl:     QUEtiapine (SEROquel) 400 MG tablet, Take 400 mg by mouth daily at bedtime  , Disp: , Rfl:     rosuvastatin (CRESTOR) 20 MG tablet, Take 1 tablet (20 mg total) by mouth every evening, Disp: 90 tablet, Rfl: 3    traMADol (Ultram) 50 mg tablet, Take 1 tablet (50 mg total) by mouth every 8 (eight) hours as needed for moderate pain, Disp: 30 tablet, Rfl: 0      Jung Jonas PA-C

## 2022-07-08 ENCOUNTER — TELEPHONE (OUTPATIENT)
Dept: OBGYN CLINIC | Facility: HOSPITAL | Age: 62
End: 2022-07-08

## 2022-07-08 NOTE — TELEPHONE ENCOUNTER
Patient called she was in yesterday had the temporary splint taken and is having pain in right wrist going up her arm  She would like to have something for pain      # [phone]

## 2022-07-11 DIAGNOSIS — T84.84XA PAINFUL ORTHOPAEDIC HARDWARE (HCC): Primary | ICD-10-CM

## 2022-07-11 DIAGNOSIS — Z47.89 AFTERCARE FOLLOWING SURGERY OF THE MUSCULOSKELETAL SYSTEM: ICD-10-CM

## 2022-07-11 RX ORDER — SENNOSIDES 8.6 MG
650 CAPSULE ORAL EVERY 8 HOURS PRN
Qty: 30 TABLET | Refills: 0 | Status: SHIPPED | OUTPATIENT
Start: 2022-07-11 | End: 2022-08-17 | Stop reason: SDUPTHER

## 2022-07-11 RX ORDER — NAPROXEN SODIUM 220 MG
220 TABLET ORAL 2 TIMES DAILY WITH MEALS
Qty: 20 TABLET | Refills: 0 | Status: SHIPPED | OUTPATIENT
Start: 2022-07-11 | End: 2022-08-17 | Stop reason: ALTCHOICE

## 2022-07-11 RX ORDER — HYDROCODONE BITARTRATE AND ACETAMINOPHEN 5; 325 MG/1; MG/1
1 TABLET ORAL EVERY 6 HOURS PRN
Qty: 5 TABLET | Refills: 0 | Status: SHIPPED | OUTPATIENT
Start: 2022-07-11 | End: 2022-07-16

## 2022-07-11 NOTE — TELEPHONE ENCOUNTER
Patient advised of above  Stated she cannot take NSAIDs due to kidney disease but she will take the tylenol and norco as recommended

## 2022-07-14 NOTE — TELEPHONE ENCOUNTER
Linzess (3 month supply) received from [facility]  I called patient [phone] to notify her that medication is available for  at our office

## 2022-07-15 ENCOUNTER — RA CDI HCC (OUTPATIENT)
Dept: OTHER | Facility: HOSPITAL | Age: 62
End: 2022-07-15

## 2022-07-15 NOTE — PROGRESS NOTES
N18 6 - not on dialysis     [facility]  coding opportunities       Chart reviewed, no opportunity found: CHART REVIEWED, NO OPPORTUNITY FOUND        Patients Insurance        Commercial Insurance: [employer]

## 2022-07-18 ENCOUNTER — EVALUATION (OUTPATIENT)
Dept: OCCUPATIONAL THERAPY | Facility: CLINIC | Age: 62
End: 2022-07-18
Payer: MEDICARE

## 2022-07-18 DIAGNOSIS — Z47.89 AFTERCARE FOLLOWING SURGERY OF THE MUSCULOSKELETAL SYSTEM: ICD-10-CM

## 2022-07-18 DIAGNOSIS — M79.601 PAIN OF RIGHT UPPER EXTREMITY: ICD-10-CM

## 2022-07-18 PROCEDURE — 97140 MANUAL THERAPY 1/> REGIONS: CPT | Performed by: OCCUPATIONAL THERAPIST

## 2022-07-18 PROCEDURE — 97165 OT EVAL LOW COMPLEX 30 MIN: CPT | Performed by: OCCUPATIONAL THERAPIST

## 2022-07-18 RX ORDER — TRAMADOL HYDROCHLORIDE 50 MG/1
50 TABLET ORAL EVERY 8 HOURS PRN
Qty: 30 TABLET | Refills: 0 | Status: SHIPPED | OUTPATIENT
Start: 2022-07-18 | End: 2022-08-01 | Stop reason: SDUPTHER

## 2022-07-18 NOTE — PROGRESS NOTES
OT Evaluation     Today's date: 2022  Patient name: Yao Mccormick  : 1960  MRN: [id number]  Referring provider: MOON Bingham*  Dx:   Encounter Diagnosis     ICD-10-CM    1  Aftercare following surgery-Removal of hardware volar aspect right distal radius, Right ring finger FDS to FPL tendon transfer  Z47 89 Ambulatory Referral to Occupational Therapy                  Assessment  Assessment details: Pt  Presents to department s/p removal of hardware of the R wrist w/ R ring finger FDS transfer to [address] [place] [address]  Pt  Is doing well with improved pain  She has mild edema, limited ROM, pain and joint stiffness of the R dominant hand/wrist   She is not lifting with her R hand and is wearing a TSS with thumb in protective extension during the day  Pt  To benefit from skilled OT to begin ROM and progress to strengthening in order to return to baseline function  Impairments: abnormal or restricted ROM, impaired physical strength, lacks appropriate home exercise program and pain with function  Functional limitations: Pt  is not lifting with R dominant hand due to surgical protocol  she is wearing an OTC short arm thumb spica splint, with thumb immobilized  Pt  is able to grasp light objects and care for basic ADLs  Understanding of Dx/Px/POC: good   Prognosis: good    Goals  STG( 6 visits)  1  Compliant with HEP/splint  2  Decrease pain to less than 6/10 with function  3   R wrist ROM improved by 10 degrees for function  LTG( 12 visits or discharge)  1  Pain free R hand function  2  R thumb full opposition for [facility]  3  Improve FOTO score to predicted outcome or greater  4  R /pinch strength WFLs for function      Plan  Patient would benefit from: skilled occupational therapy  Planned modality interventions: cryotherapy and thermotherapy: hydrocollator packs  Planned therapy interventions: joint mobilization, manual therapy, home exercise program, graded exercise, functional ROM exercises, fine motor coordination training, therapeutic activities, therapeutic exercise and strengthening  Frequency: 2x week  Duration in visits: 2  Duration in weeks: 6  Plan of Care beginning date: 2022  Plan of Care expiration date: 2022  Treatment plan discussed with: patient        Subjective Evaluation    History of Present Illness  Mechanism of injury: Removal of hardware volar aspect right distal radius (distal radial plate and screws) (Right Finger), Right ring finger flexor digitorum superficialis to flexor pollicis longus tendon transfer (Right Arm Lower),  Application short-arm thumb spica splint (Right ) on 22 with Dr Bolivar Simeon  Quality of life: good    Pain  Current pain ratin  At worst pain ratin  Quality: radiating and discomfort  Relieving factors: ice  Aggravating factors: lifting  Progression: improved    Social Support  Lives in: apartment  Lives with: sister  Employment status: not working  Hand dominance: right    Treatments  Current treatment: occupational therapy  Patient Goals  Patient goals for therapy: decreased pain, increased motion, increased strength, independence with ADLs/IADLs, return to work and decreased edema          Objective     Tenderness     Additional Tenderness Details  TTP incision    Active Range of Motion     Right Wrist   Wrist flexion: 50 degrees   Wrist extension: 70 degrees   Radial deviation: 10 degrees   Ulnar deviation: 40 degrees     Right Thumb   Flexion     CMC: 50    MP: 40    DIP: 0  Extension     MP: 0    DIP: 30  Radial Abduction    CMC: 35  Adduction    CMC: 20    Additional Active Range of Motion Details  Full composite fist  + extrinsic tightness    Strength/Myotome Testing     Additional Strength Details  TBA    Swelling     Right Wrist/Hand   Circumference MCP: 17 cm  Circumference wrist: 16 7 cm    General Comments:      Wrist/Hand Comments  6 5cm incision- flat, closed, healing well               Precautions: YAMILETH, FDS (RF) to [facility] 6/23/22-  ROM only    Manuals 7/18             IE 30'            retrograde 4m            Scar massage 4m                         Neuro Re-Ed                          Wrist- TGEs, thumb HEP reviewed                                                                             Ther Ex             Wrist A/AROM 2m            AROM FPL- thumb                                                                                           Ther Activity             opposition Small pegs                         Gait Training                                       Modalities              10m            CP post 5m

## 2022-07-22 ENCOUNTER — OFFICE VISIT (OUTPATIENT)
Dept: OCCUPATIONAL THERAPY | Facility: CLINIC | Age: 62
End: 2022-07-22
Payer: MEDICARE

## 2022-07-22 DIAGNOSIS — Z47.89 AFTERCARE FOLLOWING SURGERY OF THE MUSCULOSKELETAL SYSTEM: Primary | ICD-10-CM

## 2022-07-22 PROCEDURE — 97110 THERAPEUTIC EXERCISES: CPT | Performed by: OCCUPATIONAL THERAPIST

## 2022-07-22 PROCEDURE — 97140 MANUAL THERAPY 1/> REGIONS: CPT | Performed by: OCCUPATIONAL THERAPIST

## 2022-07-22 NOTE — PROGRESS NOTES
Daily Note     Today's date: 2022  Patient name: Jeffery Caceres  : 1960  MRN: [id number]  Referring provider: MOON Collins*  Dx:   Encounter Diagnosis     ICD-10-CM    1  Aftercare following surgery-Removal of hardware volar aspect right distal radius, Right ring finger FDS to FPL tendon transfer  Z47 89                   Subjective: It hurts a little  Objective: See treatment diary below      Assessment: Tolerated treatment well  Patient has good AROM of the wrist   Remains guarded with thumb use  Plan: Continue per plan of care        Precautions: YAMILETH, FDS (RF) to [address] [facility] 22-  ROM only    Manuals             IE 30'            retrograde 4m 4m           Scar massage 4m 4m                        Neuro Re-Ed                          Wrist- TGEs, thumb HEP reviewed                                                                             Ther Ex             Wrist A/AROM 2m 2m           AROM FPL- thumb  x10           Wrist curls  [facility] 3x10                                                                            Ther Activity             opposition Small pegs Small pegs                        Gait Training                                       Modalities             MH 10m 10m           CP post 5m 5m

## 2022-07-25 ENCOUNTER — OFFICE VISIT (OUTPATIENT)
Dept: OCCUPATIONAL THERAPY | Facility: CLINIC | Age: 62
End: 2022-07-25
Payer: MEDICARE

## 2022-07-25 DIAGNOSIS — Z47.89 AFTERCARE FOLLOWING SURGERY OF THE MUSCULOSKELETAL SYSTEM: Primary | ICD-10-CM

## 2022-07-25 PROCEDURE — 97140 MANUAL THERAPY 1/> REGIONS: CPT | Performed by: OCCUPATIONAL THERAPIST

## 2022-07-25 PROCEDURE — 97110 THERAPEUTIC EXERCISES: CPT | Performed by: OCCUPATIONAL THERAPIST

## 2022-07-25 NOTE — PROGRESS NOTES
Daily Note     Today's date: 2022  Patient name: Ki Liu  : 1960  MRN: [id number]  Referring provider: MOON Piper*  Dx:   Encounter Diagnosis     ICD-10-CM    1  Aftercare following surgery-Removal of hardware volar aspect right distal radius, Right ring finger FDS to FPL tendon transfer  Z47 89                   Subjective: I did something I shouldn't have done  Objective: See treatment diary below      Assessment: Tolerated treatment well  Patient reported increased pain today  She reported lifting a case of water with the brace on and doing dishes over the weekend with the brace off when her pain increased  She continues with good aROM of the wrist   Thumb IP flexion remains limited  Pt  IP joint remains hyperextended ~30degrees and is highly guarded with thumb contractions  She is able to gain thumb extension to neutral   Pt  Placed in a dorsal block splint of the thumb to maintain neutral IP motion when at rest   Joint capsular tightness and limited FPL gliding inhibits flexion of the thumb  Plan: Continue per plan of care        Precautions: YAMILETH, FDS (RF) to [address] [facility] 22-  ROM only    Manuals            IE 30'            retrograde 4m 4m 4m          Scar massage 4m 4m 4m                       Neuro Re-Ed                          Wrist- TGEs, thumb HEP reviewed  DBS thumb IP flex                                                                           Ther Ex             Wrist A/AROM 2m 2m 2m          AROM FPL- thumb  x10 x10          Wrist curls  [facility] 3x10 [facility] 3x10                                                                           Ther Activity             opposition Small pegs Small pegs Small pegs          Wooden pegs   thumb flexion          Gait Training                                       Modalities             MH 10m 10m 10m          CP post 5m 5m

## 2022-07-27 ENCOUNTER — TELEPHONE (OUTPATIENT)
Dept: OTHER | Facility: OTHER | Age: 62
End: 2022-07-27

## 2022-07-27 NOTE — TELEPHONE ENCOUNTER
Patient is calling regarding cancelling an appointment  Due to a scheduling conflict      Date/Time: 07/28 @3:30    Patient was rescheduled: YES [] NO [x]    Patient requesting call back to reschedule: YES [x] NO []    No preferred time or day, soonest available

## 2022-07-28 ENCOUNTER — TELEPHONE (OUTPATIENT)
Dept: NEPHROLOGY | Facility: CLINIC | Age: 62
End: 2022-07-28

## 2022-07-28 NOTE — TELEPHONE ENCOUNTER
rec'd return call from patient stating that she has not been able to get her labs drawn yet and was concerned that she needed to cancel her appt for 8/1  Asked patient if she can still go today/tomorrow to have them done and she said she could  Patient aware that results should be back by the appt  Will keep appt as scheduled

## 2022-07-28 NOTE — TELEPHONE ENCOUNTER
Received a voice mail regarding patients appt 08/01  I called back and had to leave another voice mail to please call us back

## 2022-07-29 ENCOUNTER — OFFICE VISIT (OUTPATIENT)
Dept: OCCUPATIONAL THERAPY | Facility: CLINIC | Age: 62
End: 2022-07-29
Payer: MEDICARE

## 2022-07-29 ENCOUNTER — TELEPHONE (OUTPATIENT)
Dept: NEPHROLOGY | Facility: CLINIC | Age: 62
End: 2022-07-29

## 2022-07-29 DIAGNOSIS — Z47.89 AFTERCARE FOLLOWING SURGERY OF THE MUSCULOSKELETAL SYSTEM: Primary | ICD-10-CM

## 2022-07-29 PROCEDURE — 97110 THERAPEUTIC EXERCISES: CPT | Performed by: OCCUPATIONAL THERAPIST

## 2022-07-29 PROCEDURE — 97140 MANUAL THERAPY 1/> REGIONS: CPT | Performed by: OCCUPATIONAL THERAPIST

## 2022-07-29 NOTE — PROGRESS NOTES
Daily Note     Today's date: 2022  Patient name: Dante Forbes  : 1960  MRN: [id number]  Referring provider: MOON Yee*  Dx:   Encounter Diagnosis     ICD-10-CM    1  Aftercare following surgery-Removal of hardware volar aspect right distal radius, Right ring finger FDS to FPL tendon transfer  Z47 89                   Subjective: I still have some pain  Objective: See treatment diary below      Assessment: Tolerated treatment well  With compliace of dorsal block splint  Pt  IP jt  Is in neutral position  Plan: Continue per plan of care        Precautions: YAMILETH, FDS (RF) to [address] [facility] 22-  ROM only    Manuals           IE 30'            retrograde 4m 4m 4m 4m         Scar massage 4m 4m 4m 4m                      Neuro Re-Ed                          Wrist- TGEs, thumb HEP reviewed  DBS thumb IP flex                                                                           Ther Ex             Wrist A/AROM 2m 2m 2m 2m         AROM FPL- thumb  x10 x10 x10         Wrist curls  [facility] 3x10 Cone 3x10 Cone 3x10                                                                          Ther Activity             opposition Small pegs Small pegs Small pegs Small pegs         Wooden pegs   thumb flexion 2 sets         Gait Training                                       Modalities             MH 10m 10m 10m 10m         CP post 5m 5m

## 2022-07-29 NOTE — TELEPHONE ENCOUNTER
Appointment Confirmation   Person confirmed appointment with  If not patient, name of the person PATIENT     Date and time of appointment 08/01   Patient acknowledged and will be at appointment? YES   Did you advise the patient that they will need a urine sample if they are a new patient?  NO   Did you advise the patient to bring their current medications for verification? (including any OTC) YES   Additional Information

## 2022-07-30 ENCOUNTER — APPOINTMENT (OUTPATIENT)
Dept: LAB | Facility: HOSPITAL | Age: 62
End: 2022-07-30
Attending: INTERNAL MEDICINE
Payer: MEDICARE

## 2022-07-30 DIAGNOSIS — E78.00 HYPERCHOLESTEREMIA: Chronic | ICD-10-CM

## 2022-07-30 DIAGNOSIS — N18.4 STAGE 4 CHRONIC KIDNEY DISEASE (HCC): ICD-10-CM

## 2022-07-30 LAB
ALBUMIN SERPL BCP-MCNC: 3.9 G/DL (ref 3.5–5)
ALP SERPL-CCNC: 182 U/L (ref 46–116)
ALT SERPL W P-5'-P-CCNC: 27 U/L (ref 12–78)
ANION GAP SERPL CALCULATED.3IONS-SCNC: 12 MMOL/L (ref 4–13)
AST SERPL W P-5'-P-CCNC: 17 U/L (ref 5–45)
BILIRUB SERPL-MCNC: 0.3 MG/DL (ref 0.2–1)
BUN SERPL-MCNC: 45 MG/DL (ref 5–25)
CALCIUM SERPL-MCNC: 9.7 MG/DL (ref 8.3–10.1)
CHLORIDE SERPL-SCNC: 105 MMOL/L (ref 96–108)
CHOLEST SERPL-MCNC: 241 MG/DL
CO2 SERPL-SCNC: 25 MMOL/L (ref 21–32)
CREAT SERPL-MCNC: 3.11 MG/DL (ref 0.6–1.3)
ERYTHROCYTE [DISTWIDTH] IN BLOOD BY AUTOMATED COUNT: 13.1 % (ref 11.6–15.1)
GFR SERPL CREATININE-BSD FRML MDRD: 15 ML/MIN/1.73SQ M
GLUCOSE P FAST SERPL-MCNC: 108 MG/DL (ref 65–99)
HCT VFR BLD AUTO: 37 % (ref 34.8–46.1)
HDLC SERPL-MCNC: 66 MG/DL
HGB BLD-MCNC: 11.2 G/DL (ref 11.5–15.4)
LDLC SERPL CALC-MCNC: 145 MG/DL (ref 0–100)
MAGNESIUM SERPL-MCNC: 2.4 MG/DL (ref 1.6–2.6)
MCH RBC QN AUTO: 31 PG (ref 26.8–34.3)
MCHC RBC AUTO-ENTMCNC: 30.3 G/DL (ref 31.4–37.4)
MCV RBC AUTO: 103 FL (ref 82–98)
PHOSPHATE SERPL-MCNC: 4.3 MG/DL (ref 2.3–4.1)
PLATELET # BLD AUTO: 260 THOUSANDS/UL (ref 149–390)
PMV BLD AUTO: 10.8 FL (ref 8.9–12.7)
POTASSIUM SERPL-SCNC: 4.7 MMOL/L (ref 3.5–5.3)
PROT SERPL-MCNC: 7.7 G/DL (ref 6.4–8.4)
PTH-INTACT SERPL-MCNC: 104.1 PG/ML (ref 18.4–80.1)
RBC # BLD AUTO: 3.61 MILLION/UL (ref 3.81–5.12)
SODIUM SERPL-SCNC: 142 MMOL/L (ref 135–147)
TRIGL SERPL-MCNC: 149 MG/DL
URATE SERPL-MCNC: 6.4 MG/DL (ref 2–7.5)
WBC # BLD AUTO: 5.72 THOUSAND/UL (ref 4.31–10.16)

## 2022-07-30 PROCEDURE — 85027 COMPLETE CBC AUTOMATED: CPT

## 2022-07-30 PROCEDURE — 83735 ASSAY OF MAGNESIUM: CPT

## 2022-07-30 PROCEDURE — 80061 LIPID PANEL: CPT

## 2022-07-30 PROCEDURE — 80053 COMPREHEN METABOLIC PANEL: CPT

## 2022-07-30 PROCEDURE — 84100 ASSAY OF PHOSPHORUS: CPT

## 2022-07-30 PROCEDURE — 36415 COLL VENOUS BLD VENIPUNCTURE: CPT

## 2022-07-30 PROCEDURE — 83970 ASSAY OF PARATHORMONE: CPT

## 2022-07-30 PROCEDURE — 84550 ASSAY OF BLOOD/URIC ACID: CPT

## 2022-08-01 ENCOUNTER — OFFICE VISIT (OUTPATIENT)
Dept: NEPHROLOGY | Facility: HOSPITAL | Age: 62
End: 2022-08-01
Payer: MEDICARE

## 2022-08-01 ENCOUNTER — OFFICE VISIT (OUTPATIENT)
Dept: OBGYN CLINIC | Facility: CLINIC | Age: 62
End: 2022-08-01

## 2022-08-01 ENCOUNTER — OFFICE VISIT (OUTPATIENT)
Dept: OCCUPATIONAL THERAPY | Facility: CLINIC | Age: 62
End: 2022-08-01
Payer: MEDICARE

## 2022-08-01 VITALS — HEIGHT: 67 IN | BODY MASS INDEX: 34.88 KG/M2 | WEIGHT: 222.2 LBS

## 2022-08-01 VITALS
BODY MASS INDEX: 34.84 KG/M2 | DIASTOLIC BLOOD PRESSURE: 80 MMHG | HEIGHT: 67 IN | WEIGHT: 222 LBS | SYSTOLIC BLOOD PRESSURE: 152 MMHG

## 2022-08-01 DIAGNOSIS — Z47.89 AFTERCARE FOLLOWING SURGERY OF THE MUSCULOSKELETAL SYSTEM: Primary | ICD-10-CM

## 2022-08-01 DIAGNOSIS — N18.5 CKD (CHRONIC KIDNEY DISEASE) STAGE 5, GFR LESS THAN 15 ML/MIN (HCC): Primary | ICD-10-CM

## 2022-08-01 DIAGNOSIS — M79.601 PAIN OF RIGHT UPPER EXTREMITY: ICD-10-CM

## 2022-08-01 DIAGNOSIS — K59.03 DRUG-INDUCED CONSTIPATION: Primary | ICD-10-CM

## 2022-08-01 PROCEDURE — 99024 POSTOP FOLLOW-UP VISIT: CPT | Performed by: ORTHOPAEDIC SURGERY

## 2022-08-01 PROCEDURE — 97110 THERAPEUTIC EXERCISES: CPT | Performed by: OCCUPATIONAL THERAPIST

## 2022-08-01 PROCEDURE — 99214 OFFICE O/P EST MOD 30 MIN: CPT | Performed by: INTERNAL MEDICINE

## 2022-08-01 PROCEDURE — 97140 MANUAL THERAPY 1/> REGIONS: CPT | Performed by: OCCUPATIONAL THERAPIST

## 2022-08-01 NOTE — PROGRESS NOTES
OFFICE FOLLOW UP - Nephrology   [facility] 63 y o  female MRN: [id number]    Encounter: [id number]        ASSESSMENT and PLAN:  Diagnoses and all orders for this visit:    68-year-old female with a past medical history of bipolar disorder, stage 4 chronic kidney disease who presents for follow-up    Chronic kidney disease, stage IV  - creatinine fluctuates and is anywhere between 1 9 in 2 4, estimated GFR between 22 and 28 mL/minute  - she is currently not on any nephrotoxic agent,  Was on lithium for several years  - renal cyst is stable in size   -she is off her potassium supplementation  -blood pressures have been well controlled  -urine protein to creatinine ratios have been minimal  -she has had echogenic kidneys that have been small in size since at least 2016  -completed CKD education  - status post AV fistula creation left radiocephalic with good bruit and thrill  - unfortunately her creatinine has been worsening now up to 3 1 with a GFR of 15  She has a fistula currently no urgent indication to start dialysis but we will have her evaluated for renal transplant    Bipolar 2 disorder ([facility] [address] ), eating disorder  - she was on lithium in her 25s and may have some chronic interstitial nephritis associated with the she also has some mild hypernatremia  - she is currently being treated with Seroquel    Cyst of right kidney  - she has had recent MRIs and CT scan that have been done serially most recently had a CT scan in February of 2020 this is grossly stable partially exophytic nodule left lower pole    Secondary renal hyperparathyroidism ([address] )  - she has a PTH and has remained relatively stable her phosphorus is mildly high will continue to treat this with diet we did start vitamin-D supplementation and her calcium increased    Proteinuria  - she has a positive urine protein to creatinine ratio but a negative urinalysis she is  mildly anemic with CKD    She was on lithium several years ago she also has had acute kidney injury from chronic lactulose  Use  -  limited serologic workup was negative last urine protein to creatinine ratio was negligible    hypertension  -she is now on carvedilol 6 25 mg twice daily,  and amiloride 5 mg twice daily-(in the setting of DI associated with chronic lithium use Amiloride was initiated)  -blood pressure is well controlled  -now off amlodipine blood pressure stable she was having side effects of increased edema  -blood pressure remains well controlled    Hypernatremia  -her serum sodiums are stable  -continue amiloride 5 mg twice daily for now if potassium is rising because of worsening CKD will have to down titrate her or hold    Elevated LFTs  -LFTs are stable  -continue statin    Given rise in serum creatinine Will repeat BMP in 1 week to ensure stability and make further recommendations at that time  Given concern for progression will follow up with her in 3 months with full set of lab work  Schedule renal transplant evaluation  Further recommendations on going    HPI: Dante Forbes is a 63 y o  female who is here for No chief complaint on file  Since her last office visit she is doing relatively well she denies any acute chest pain or shortness of breath no fevers or chills she had a recent arm surgery, her urine output has been state stable she states for the past couple months food has been tasting like metal she is having some difficulties with sleep as well  She has a her breathing is stable her electrolytes are stable she has no acute chest pain or shortness of breath    ROS:   All the systems were reviewed and were negative except as documented on the HPI      Allergies: Pollen extract    Medications:   Current Outpatient Medications:     acetaminophen (TYLENOL) 650 mg CR tablet, Take 1 tablet (650 mg total) by mouth every 8 (eight) hours as needed for mild pain, Disp: 30 tablet, Rfl: 0    albuterol (Ventolin HFA) 90 mcg/act inhaler, Inhale 2 puffs every 6 (six) hours as needed for wheezing or shortness of breath, Disp: 8 5 g, Rfl: 3    AMILoride 5 mg tablet, Take 1 tablet (5 mg total) by mouth 2 (two) times a day, Disp: 180 tablet, Rfl: 3    aspirin 81 mg chewable tablet, Chew 1 tablet (81 mg total) daily, Disp: , Rfl:     carvedilol (COREG) 25 mg tablet, Take 1 tablet (25 mg total) by mouth 2 (two) times a day with meals, Disp: 180 tablet, Rfl: 3    cholecalciferol (VITAMIN D3) 1,000 units tablet, Take 5 tablets (5,000 Units total) by mouth daily, Disp: 90 tablet, Rfl: 5    clonazePAM (KlonoPIN) 0 5 mg tablet, Take 1 tablet (0 5 mg total) by mouth 3 (three) times a day, Disp: 270 tablet, Rfl: 0    fluvoxaMINE (LUVOX) 100 mg tablet, 2 (two) times a day 1 tab am , 2 tabs HS, Disp: , Rfl:     lamoTRIgine (LaMICtal) 200 MG tablet, Take 200 mg by mouth 2 (two) times a day , Disp: , Rfl:     linaCLOtide (Linzess) 290 MCG CAPS, Take 1 capsule by mouth daily, Disp: 90 capsule, Rfl: 0    naloxone (NARCAN) 4 mg/0 1 mL nasal spray, Administer 1 spray into a nostril   If no response after 2-3 minutes, give another dose in the other nostril using a new spray , Disp: 1 each, Rfl: 1    omeprazole (PriLOSEC) 40 MG capsule, Take 1 capsule (40 mg total) by mouth daily before breakfast, Disp: 90 capsule, Rfl: 3    ondansetron (ZOFRAN) 4 mg tablet, Take 1 tablet (4 mg total) by mouth every 8 (eight) hours as needed for nausea or vomiting, Disp: 60 tablet, Rfl: 1    Polyethylene Glycol 3350 (MIRALAX PO), Take by mouth as needed , Disp: , Rfl:     QUEtiapine (SEROquel) 100 mg tablet, Take 1 tablet by mouth daily at bedtime 100mg with 400mg bedtime , Disp: , Rfl:     QUEtiapine (SEROquel) 300 mg tablet, Take 300 mg by mouth in the morning 300mg in morning , Disp: , Rfl:     QUEtiapine (SEROquel) 400 MG tablet, Take 400 mg by mouth daily at bedtime  , Disp: , Rfl:     traMADol (Ultram) 50 mg tablet, Take 1 tablet (50 mg total) by mouth every 8 (eight) hours as needed for moderate pain, Disp: 30 tablet, Rfl: 0    acetaminophen (TYLENOL) 650 mg CR tablet, Take 1 tablet (650 mg total) by mouth every 8 (eight) hours as needed for mild pain (Patient not taking: Reported on 8/1/2022), Disp: 30 tablet, Rfl: 0    naproxen sodium (ALEVE) 220 MG tablet, Take 1 tablet (220 mg total) by mouth 2 (two) times a day with meals (Patient not taking: Reported on 8/1/2022), Disp: 20 tablet, Rfl: 0    naproxen sodium (ALEVE) 220 MG tablet, Take 1 tablet (220 mg total) by mouth 2 (two) times a day with meals (Patient not taking: Reported on 8/1/2022), Disp: 20 tablet, Rfl: 0    rosuvastatin (CRESTOR) 20 MG tablet, Take 1 tablet (20 mg total) by mouth every evening (Patient not taking: Reported on 8/1/2022), Disp: 90 tablet, Rfl: 3    Past Medical History:   Diagnosis Date    Anxiety     Anxiety disorder     At risk for falls     Bipolar 2 disorder (HCC)     Chronic back pain     Chronic kidney disease     Closed fracture of distal end of right fibula with routine healing 11/04/2020    COVID-19     in Jan 2021    CVA (cerebral vascular accident) ([address] )     noted on MRI in the past    Depression     GERD (gastroesophageal reflux disease)     Hypercholesteremia     Hypernatremia     Hypertension     Hypokalemia     Idiopathic chronic pancreatitis ([address] ) 03/20/2018    Intervertebral disc disorder with radiculopathy of lumbosacral region     resolved: 12/28/2015    Kidney disease     Limb alert care status     LUE-fistula    Panic attacks     Pericardial effusion     PONV (postoperative nausea and vomiting)     Radiculitis     resolved: 12/28/2015    Secondary renal hyperparathyroidism ([address] )     Vitamin D deficiency      Past Surgical History:   Procedure Laterality Date    BUNIONECTOMY      Left foot     CAST APPLICATION Right 3/93/3751    Procedure: Application short-arm thumb spica splint;  Surgeon: Corinne Hassan MD;  Location:  MAIN OR;  Service: Orthopedics    COLON SURGERY      COLONOSCOPY  2021    DILATION AND CURETTAGE OF UTERUS      INDUCED       surgically induced    WY ANASTOMOSIS,AV,ANY SITE Left 2019    Procedure: CREATION FISTULA ARTERIOVENOUS (AV) left wrists possible left upper;  Surgeon: Oscar Knox MD;  Location: [facility] MAIN OR;  Service: Vascular    [place] [facility]    Procedure: Bel Reid;  Surgeon: Adele Solomon DPM;  Location: [facility];  Service: [address] [place] [address] [facility] Right 8/3/2020    Procedure: Zekeia Levee;  Surgeon: Adele Solomon DPM;  [facility];  Service: Podiatry    [facility] PHLNX OSTEOT Right 2021    Procedure: Everett Ladi, right tameka osteotomy and 2nd claw toe correction;  Surgeon: Monica Amin MD;  [facility];  Service: Orthopedics    WY ERCP DX COLLECTION SPECIMEN BRUSHING/WASHING N/A 2018    Procedure: ENDOSCOPIC RETROGRADE CHOLANGIOPANCREATOGRAPHY (ERCP); Surgeon: Mariangel Howard MD;  Location: [facility] MA[facility] OR;  Service: Gastroenterology    W[place] FINGER TENDON TOVOQTBC,0-2 FINGRS Right 2022    Procedure: Right ring finger flexor digitorum superficialis to flexor pollicis longus tendon transfer;  Surgeon: Anish Mejia MD;  [facility];  Service: Orthopedics    WY LAP, SURG PROCTOPEXY N/A 2018    Procedure: ROBOTIC SIGMOID RESECTION / RECTOPEXY;  Surgeon: Rae Miner MD;  Location: [facility] MAIN OR;  Service: Colorectal    WY OPEN TREATMENT RADIAL SHAFT FRACTURE Right 10/11/2021    Procedure: OPEN REDUCTION W/ INTERNAL FIXATION (ORIF) RADIUS (WRIST), RIGHT DISTAL;  Surgeon: Mami Uribe MD;  [facility];  Service: Orthopedics    WY REMOVAL DEEP IMPLANT Right 2022    Procedure: Removal of hardware volar aspect right distal radius (distal radial plate and screws);   Surgeon: Anish Mejia MD;  [facility]: [facility];  Service: Orthopedics    MI SIGMOIDOSCOPY FLX DX W/COLLJ SPEC BR/WA IF PFRMD N/A 7/13/2018    Procedure: Taylor Hunt;  Surgeon: Beena Lam MD;  Location: [facility];  Service: Colorectal    [facility]UB[facility] L[facility]ATION Bilateral 1997   [facility]  [facility]     Family History   Problem Relation Age of Onset    Bipolar disorder Mother     Mental illness Mother         depression    Stroke Mother     Dementia Mother     Colon polyps Mother     Heart disease Father     Hypertension Father     Diabetes Father     Other Family         Back disorder    Diabetes Family     Heart disease Family     Hypertension Family     Stroke Family     Thyroid disease Family     Breast cancer Paternal [de-identified]         age unknown   Magdalena Rhody Breast cancer Paternal [de-identified]         age unknown   Magdalena Rhody Breast cancer Maternal Aunt         age unknown    Mental illness Sister     Colon polyps Sister     Mental illness Sister     Heart disease Sister     No Known Problems Sister     Breast cancer Sister 76    Other Son         pituitary tumor    Hypertension Son     Obesity Son     No Known Problems Son     No Known Problems Maternal Grandmother     No Known Problems Maternal Grandfather     No Known Problems Paternal Grandfather     Breast cancer Paternal Aunt         age unknown    Substance Abuse Neg Hx         neg fam hx    Colon cancer Neg Hx       reports that she has never smoked  She has never used smokeless tobacco  She reports previous drug use  She reports that she does not drink alcohol  Physical Exam:   Vitals:    08/01/22 1154   Weight: 101 kg (222 lb 3 2 oz)   Height: 5' 7" (1 702 m)     Body mass index is 34 8 kg/m²      General: conscious, cooperative, in no acute distress  Eyes: conjunctivae pink, anicteric sclerae  ENT: lips and mucous membranes moist  Neck: supple, no JVD  Chest: clear breath sounds bilateral, no crackles, ronchus or wheezings  CVS: normal rate, regular rhythm  Abdomen: soft, non-tender, non-distended, normoactive bowel sounds  Extremities:  Trace edema in the lower extremity  Skin: no rash  Neuro: awake, alert, oriented  Psych:  Pleasant affect    Lab Results:    Results for orders placed or performed in visit on 07/30/22   Comprehensive metabolic panel   Result Value Ref Range    Sodium 142 135 - 147 mmol/L    Potassium 4 7 3 5 - 5 3 mmol/L    Chloride 105 96 - 108 mmol/L    CO2 25 21 - 32 mmol/L    ANION GAP 12 4 - 13 mmol/L    BUN 45 (H) 5 - 25 mg/dL    Creatinine 3 11 (H) 0 60 - 1 30 mg/dL    Glucose, Fasting 108 (H) 65 - 99 mg/dL    Calcium 9 7 8 3 - 10 1 mg/dL    AST 17 5 - 45 U/L    ALT 27 12 - 78 U/L    Alkaline Phosphatase 182 (H) 46 - 116 U/L    Total Protein 7 7 6 4 - 8 4 g/dL    Albumin 3 9 3 5 - 5 0 g/dL    Total Bilirubin 0 30 0 20 - 1 00 mg/dL    eGFR 15 ml/min/1 73sq m   Magnesium   Result Value Ref Range    Magnesium 2 4 1 6 - 2 6 mg/dL   Phosphorus   Result Value Ref Range    Phosphorus 4 3 (H) 2 3 - 4 1 mg/dL   PTH, intact   Result Value Ref Range     1 (H) 18 4 - 80 1 pg/mL   CBC and Platelet   Result Value Ref Range    WBC 5 72 4 31 - 10 16 Thousand/uL    RBC 3 61 (L) 3 81 - 5 12 Million/uL    Hemoglobin 11 2 (L) 11 5 - 15 4 g/dL    Hematocrit 37 0 34 8 - 46 1 %     (H) 82 - 98 fL    MCH 31 0 26 8 - 34 3 pg    MCHC 30 3 (L) 31 4 - 37 4 g/dL    RDW 13 1 11 6 - 15 1 %    Platelets 187 258 - 215 Thousands/uL    MPV 10 8 8 9 - 12 7 fL   Uric acid   Result Value Ref Range    Uric Acid 6 4 2 0 - 7 5 mg/dL   Lipid Panel with Direct LDL reflex   Result Value Ref Range    Cholesterol 241 (H) See Comment mg/dL    Triglycerides 149 See Comment mg/dL    HDL, Direct 66 >=50 mg/dL    LDL Calculated 145 (H) 0 - 100 mg/dL     *Note: Due to a large number of results and/or encounters for the requested time period, some results have not been displayed  A complete set of results can be found in Results Review         Portions of the record may have been created with voice recognition software  Occasional wrong word or "sound a like" substitutions may have occurred due to the inherent limitations of voice recognition software  Read the chart carefully and recognize, using context, where substitutions have occurred  If you have any questions, please contact the dictating provider

## 2022-08-01 NOTE — PATIENT INSTRUCTIONS
Chronic Kidney Disease   WHAT YOU NEED TO KNOW:   Chronic kidney disease (CKD) is the gradual and permanent loss of kidney function  It is also called chronic kidney failure, or chronic renal insufficiency  Normally, the kidneys remove fluid, chemicals, and waste from your blood  These wastes are turned into urine by your kidneys  CKD may worsen over time and lead to kidney failure  Your CKD team will help you and your family plan for your care at home  The team will help you create goals and find ways to meet your goals  Your care plan may change over time as your needs change  DISCHARGE INSTRUCTIONS:   Call your local emergency number (911 in the [address]) if:   You have a seizure  You have shortness of breath  Return to the emergency department if:   You are confused and very drowsy  Call your doctor or nephrologist if:   You suddenly gain or lose more weight than your healthcare provider has told you is okay  You have itchy skin or a rash  You urinate more or less than you normally do  You have blood in your urine  You have nausea and are vomiting  You have fatigue or muscle weakness  You have hiccups that will not stop  You have questions or concerns about your condition or care  Medicines:   Medicines  may be given to decrease blood pressure and get rid of extra fluid  You may also receive medicine to manage health conditions that may occur with CKD, such as anemia, diabetes, and heart disease  Take your medicine as directed  Contact your healthcare provider if you think your medicine is not helping or if you have side effects  Tell him or her if you are allergic to any medicine  Keep a list of the medicines, vitamins, and herbs you take  Include the amounts, and when and why you take them  Bring the list or the pill bottles to follow-up visits  Carry your medicine list with you in case of an emergency  What you can do to manage CKD:   Management may include making some lifestyle changes  Tell your healthcare provider if you have any concerns about being able to make changes  He or she can help you find solutions, including working with specialists  Ask for help creating a plan to break large goals into smaller steps  Your plan may include any of the following:  Manage other health conditions  Your healthcare provider will work with you to make a care plan that meets your needs  You will be checked regularly for heart disease or other conditions that can make CKD worse, such as diabetes  Your blood pressure will be closely monitored  You will also get a target blood pressure and help making a plan to reach your target  This may include taking your blood pressure at home  Maintain a healthy weight  Your weight and body mass index (BMI) will be checked regularly  BMI helps find if your weight is healthy for your height  Your healthcare provider will use other tests to check your muscle and protein levels  Extra weight can strain your kidneys  A low weight or low muscle mass can make you feel more tired  You may have trouble doing your daily activities  Ask your provider what a healthy weight is for you  He or she can help you create a plan to lose or gain weight safely, if needed  The plan may include keeping a food diary  This is a list of foods and liquids you have each day  Your provider will use the diary to help you make changes, if needed  Changes are based on your health and any other conditions you have, such as diabetes  Create an exercise plan  Regular exercise can help you manage CKD, high blood pressure, and diabetes  Exercise also helps control weight  Your provider can help you create exercise goals and a plan to reach those goals  For example, your goal may be to exercise for 30 minutes in a day  Your plan can include breaking exercise into 10 minute sessions, 3 times during the day  Create a healthy eating plan    Your provider may tell you to eat food low in potassium, phosphorus, or protein  Your provider may also recommend vitamin or mineral supplements  Do not take any supplements without talking to your provider  A dietitian can help you plan meals if needed  Ask how much liquid to drink each day and which liquids are best for you  Limit sodium (salt) as directed  You may need to limit sodium to less than 2,300 milligrams (mg) each day  Ask your dietitian or healthcare provider how much sodium you can have each day  The amount depends on your stage of kidney disease  Table salt, canned foods, soups, salted snacks, and processed meats, like deli meats and sausage, are high in sodium  Your provider or a dietitian can show you how to read food labels for sodium  Limit alcohol as directed  Alcohol can cause fluid retention and can affect your kidneys  Ask how much alcohol is safe for you  A drink of alcohol is 12 ounces of beer, 5 ounces of wine, or 1½ ounces of liquor  Do not smoke  Nicotine and other chemicals in cigarettes and cigars can cause kidney damage  Ask your provider for information if you currently smoke and need help to quit  E-cigarettes or smokeless tobacco still contain nicotine  Talk to your provider before you use these products  Ask about over-the-counter medicines  Medicines such as NSAIDs and laxatives may harm your kidneys  Some cough and cold medicines can raise your blood pressure  Always ask if a medicine is safe before you take it  Ask about vaccines you may need  CKD can increase your risk for infections such as pneumonia, influenza, and hepatitis  Vaccines lower your risk for infection  Your healthcare provider will tell you which vaccines you need and when to get them  Follow up with your doctor or nephrologist as directed: You will need to return for tests to monitor your kidney and nerve function, and your parathyroid hormone level   Your medicines may be changed, based on certain test results  Write down your questions so you remember to ask them during your visits  © Copyright [employer] 2022 Information is for End User's use only and may not be sold, redistributed or otherwise used for commercial purposes  All illustrations and images included in CareNotes® are the copyrighted property of [employer]  or [employer]  The above information is an  only  It is not intended as medical advice for individual conditions or treatments  Talk to your doctor, nurse or pharmacist before following any medical regimen to see if it is safe and effective for you

## 2022-08-01 NOTE — PROGRESS NOTES
Assessment:   S/P Removal of hardware volar aspect right distal radius (distal radial plate and screws) - Right, Right ring finger flexor digitorum superficialis to flexor pollicis longus tendon transfer - Right, and Application short-arm thumb spica splint - Right on 6/23/2022    Plan:   Therapy  - continue with ROM activities  No formal restrictions at this time  Can D/C the thumb spica brace  OK to start resuming activities as tolerated    Follow Up:  6-8 weeks    To Do Next Visit:  Ruben Corona post operative recovery       CHIEF COMPLAINT:  Chief Complaint   Patient presents with    Right Wrist - Post-op      Removal of hardware volar aspect right distal radius (distal radial plate and screws)  Right ring finger flexor digitorum superficialis to flexor pollicis longus tendon transfer- 6/23/22         SUBJECTIVE:  Ananya Gordillo is a 63 y o  female who presents for follow up after Removal of hardware volar aspect right distal radius (distal radial plate and screws) - Right, Right ring finger flexor digitorum superficialis to flexor pollicis longus tendon transfer - Right, and Application short-arm thumb spica splint - Right on 6/23/2022  Today patient has stiffness in the right thumb  Patient notes that she has improvement in her pain overall, but she is still getting pain that shoots up to the elbow  She has been wearing her brace regularly  She denies any other acute complaints         PHYSICAL EXAMINATION:  Vital signs: /80   Ht 5' 7" (1 702 m)   Wt 101 kg (222 lb)   LMP  (LMP Unknown)   BMI 34 77 kg/m²   General: well developed and well nourished, alert, oriented times 3 and appears comfortable  Psychiatric: Normal    MUSCULOSKELETAL EXAMINATION:  Incision: healed  Range of Motion: patient can make a full fist with her fingers, full flexion and extension at the wrist  Patient has the ability to flex at the IP joint of the thumb with significant stiffness  Neurovascular status: Neuro intact, good cap refill  Activity Restrictions: No restrictions       STUDIES REVIEWED:  No Studies to review      PROCEDURES PERFORMED:  Procedures  No Procedures performed today    Scribe Attestation    I,:  Blaise Siemens, PA-C am acting as a scribe while in the presence of the attending physician :       I,:  Kacey Hernandez MD personally performed the services described in this documentation    as scribed in my presence :

## 2022-08-01 NOTE — PROGRESS NOTES
Daily Note     Today's date: 2022  Patient name: Tresa Lay  : 1960  MRN: [id number]  Referring provider: MOON House*  Dx:   Encounter Diagnosis     ICD-10-CM    1  Aftercare following surgery-Removal of hardware volar aspect right distal radius, Right ring finger FDS to FPL tendon transfer  Z47 89                   Subjective: I am trying to bend my thumb  Objective: See treatment diary below      Assessment: Tolerated treatment well  With compliace of dorsal block splint  Pt  IP jt  Is in neutral position  Reinforced MP blocking to allow for isolated IP flexion  Pt  Habitually hyperextending IP joint with tip pinch to the thumb  Plan: Continue per plan of care  Progress per MD orders       Precautions: YAMILETH, FDS (RF) to FPL 22-  ROM only    Manuals          IE 30'            retrograde 4m 4m 4m 4m 4m        Scar massage 4m 4m 4m 4m 4m                     Neuro Re-Ed                          Wrist- TGEs, thumb HEP reviewed  DBS thumb IP flex                                                                           Ther Ex             Wrist A/AROM 2m 2m 2m 2m 2m        AROM FPL- thumb  x10 x10 x10 x10        Wrist curls  Cone 3x10 Cone 3x10 Cone 3x10 Cone 3x10        Blocking IP flexion    2m 2m                                                            Ther Activity             opposition Small pegs Small pegs Small pegs Small pegs Small pegs        Wooden pegs   thumb flexion 2 sets 2 sets w  thumb flexion        Gait Training                                       Modalities             MH 10m 10m 10m 10m 10m        CP post 5m 5m

## 2022-08-02 ENCOUNTER — TELEPHONE (OUTPATIENT)
Dept: NEPHROLOGY | Facility: CLINIC | Age: 62
End: 2022-08-02

## 2022-08-02 ENCOUNTER — LAB (OUTPATIENT)
Dept: LAB | Facility: HOSPITAL | Age: 62
End: 2022-08-02
Attending: INTERNAL MEDICINE
Payer: MEDICARE

## 2022-08-02 DIAGNOSIS — N18.5 CKD (CHRONIC KIDNEY DISEASE) STAGE 5, GFR LESS THAN 15 ML/MIN (HCC): ICD-10-CM

## 2022-08-02 DIAGNOSIS — N18.4 CKD (CHRONIC KIDNEY DISEASE) STAGE 4, GFR 15-29 ML/MIN (HCC): Primary | ICD-10-CM

## 2022-08-02 LAB
ANION GAP SERPL CALCULATED.3IONS-SCNC: 5 MMOL/L (ref 4–13)
BACTERIA UR QL AUTO: ABNORMAL /HPF
BILIRUB UR QL STRIP: NEGATIVE
BUN SERPL-MCNC: 42 MG/DL (ref 5–25)
CALCIUM SERPL-MCNC: 10.7 MG/DL (ref 8.3–10.1)
CHLORIDE SERPL-SCNC: 112 MMOL/L (ref 96–108)
CLARITY UR: CLEAR
CO2 SERPL-SCNC: 23 MMOL/L (ref 21–32)
COLOR UR: COLORLESS
CREAT SERPL-MCNC: 2.83 MG/DL (ref 0.6–1.3)
CREAT UR-MCNC: 61.9 MG/DL
GFR SERPL CREATININE-BSD FRML MDRD: 17 ML/MIN/1.73SQ M
GLUCOSE P FAST SERPL-MCNC: 141 MG/DL (ref 65–99)
GLUCOSE UR STRIP-MCNC: NEGATIVE MG/DL
HGB UR QL STRIP.AUTO: NEGATIVE
KETONES UR STRIP-MCNC: NEGATIVE MG/DL
LEUKOCYTE ESTERASE UR QL STRIP: ABNORMAL
NITRITE UR QL STRIP: NEGATIVE
NON-SQ EPI CELLS URNS QL MICRO: ABNORMAL /HPF
PH UR STRIP.AUTO: 6.5 [PH]
POTASSIUM SERPL-SCNC: 4.7 MMOL/L (ref 3.5–5.3)
PROT UR STRIP-MCNC: ABNORMAL MG/DL
PROT UR-MCNC: 39 MG/DL
PROT/CREAT UR: 0.63 MG/G{CREAT} (ref 0–0.1)
RBC #/AREA URNS AUTO: ABNORMAL /HPF
SODIUM SERPL-SCNC: 140 MMOL/L (ref 135–147)
SP GR UR STRIP.AUTO: 1.01 (ref 1–1.03)
TRANS CELLS #/AREA URNS HPF: PRESENT /[HPF]
UROBILINOGEN UR STRIP-ACNC: <2 MG/DL
WBC #/AREA URNS AUTO: ABNORMAL /HPF

## 2022-08-02 PROCEDURE — 36415 COLL VENOUS BLD VENIPUNCTURE: CPT

## 2022-08-02 PROCEDURE — 80048 BASIC METABOLIC PNL TOTAL CA: CPT

## 2022-08-02 PROCEDURE — 81001 URINALYSIS AUTO W/SCOPE: CPT

## 2022-08-02 PROCEDURE — 82570 ASSAY OF URINE CREATININE: CPT

## 2022-08-02 PROCEDURE — 84156 ASSAY OF PROTEIN URINE: CPT

## 2022-08-02 RX ORDER — TRAMADOL HYDROCHLORIDE 50 MG/1
50 TABLET ORAL EVERY 8 HOURS PRN
Qty: 30 TABLET | Refills: 0 | Status: SHIPPED | OUTPATIENT
Start: 2022-08-02 | End: 2022-08-17

## 2022-08-02 RX ORDER — LAMOTRIGINE 200 MG/1
200 TABLET ORAL 2 TIMES DAILY
Qty: 60 TABLET | Refills: 1 | Status: SHIPPED | OUTPATIENT
Start: 2022-08-02

## 2022-08-02 NOTE — TELEPHONE ENCOUNTER
----- Message from Jeff Marin DO sent at 8/2/2022  1:17 PM EDT -----  Creatinine has improved to 2 8 was 3 1 if she feels okay please have her repeat a BMP in 1 month and will monitor symptoms

## 2022-08-02 NOTE — TELEPHONE ENCOUNTER
I spoke with Brenna Paul informed her per Dr Taya Gill her creatinine level has improved to 2 8 previously was 3 1 she is to repeat a bmp in 1 month  I asked the pt if she is having any s/s? Pt c/o tiredness and requested a refill on amloride 5mg I informed the pt to contact her PCP for the medication refill since it was prescribed by Dr Malika Bowers  Pt verbalized understanding

## 2022-08-03 DIAGNOSIS — E78.00 HYPERCHOLESTEREMIA: Primary | ICD-10-CM

## 2022-08-05 ENCOUNTER — OFFICE VISIT (OUTPATIENT)
Dept: OCCUPATIONAL THERAPY | Facility: CLINIC | Age: 62
End: 2022-08-05
Payer: MEDICARE

## 2022-08-05 DIAGNOSIS — Z47.89 AFTERCARE FOLLOWING SURGERY OF THE MUSCULOSKELETAL SYSTEM: Primary | ICD-10-CM

## 2022-08-05 PROCEDURE — 97140 MANUAL THERAPY 1/> REGIONS: CPT | Performed by: OCCUPATIONAL THERAPIST

## 2022-08-05 PROCEDURE — 97110 THERAPEUTIC EXERCISES: CPT | Performed by: OCCUPATIONAL THERAPIST

## 2022-08-05 NOTE — PROGRESS NOTES
Daily Note     Today's date: 2022  Patient name: Jeffery Caceres  : 1960  MRN: [id number]  Referring provider: MOON Collins*  Dx:   Encounter Diagnosis     ICD-10-CM    1  Aftercare following surgery-Removal of hardware volar aspect right distal radius, Right ring finger FDS to FPL tendon transfer  Z47 89                   Subjective: I don't need the splint anymore  Objective: See treatment diary below      Assessment: Tolerated treatment well  Pt  Cleared by MD with no restrictions  Began light resistance without complaint  Pt  Juwan Robbins with stiff IP joint of the thumb  Plan: Continue per plan of care  Precautions: YAMILETH, FDS (RF) to FPL 22- No restrictions      Manuals         IE 30'            retrograde 4m 4m 4m 4m 4m 4m       Scar massage 4m 4m 4m 4m 4m 4m                    Neuro Re-Ed                          Wrist- TGEs, thumb HEP reviewed  DBS thumb IP flex                                                                           Ther Ex             Wrist A/AROM 2m 2m 2m 2m 2m 2m       AROM FPL- thumb  x10 x10 x10 x10 x10       Wrist curls  Cone 3x10 Cone 3x10 Cone 3x10 Cone 3x10 #2 3x10       Blocking IP flexion    2m 2m 2m       therabar p/s      yellow 3x10       gripping      GPW 2x30       Pinch clips      Red 3 sets                    Ther Activity             opposition Small pegs Small pegs Small pegs Small pegs Small pegs        Wooden pegs   thumb flexion 2 sets 2 sets w  thumb flexion        Gait Training                                       Modalities             MH 10m 10m 10m 10m 10m 10m       CP post 5m 5m

## 2022-08-09 ENCOUNTER — OFFICE VISIT (OUTPATIENT)
Dept: OCCUPATIONAL THERAPY | Facility: CLINIC | Age: 62
End: 2022-08-09
Payer: MEDICARE

## 2022-08-09 DIAGNOSIS — Z47.89 AFTERCARE FOLLOWING SURGERY OF THE MUSCULOSKELETAL SYSTEM: Primary | ICD-10-CM

## 2022-08-09 PROCEDURE — 97140 MANUAL THERAPY 1/> REGIONS: CPT | Performed by: OCCUPATIONAL THERAPIST

## 2022-08-09 PROCEDURE — 97110 THERAPEUTIC EXERCISES: CPT | Performed by: OCCUPATIONAL THERAPIST

## 2022-08-09 NOTE — PROGRESS NOTES
Daily Note     Today's date: 2022  Patient name: Jersey Gonzales  : 1960  MRN: [id number]  Referring provider: MOON Gustafson*  Dx:   Encounter Diagnosis     ICD-10-CM    1  Aftercare following surgery-Removal of hardware volar aspect right distal radius, Right ring finger FDS to FPL tendon transfer  Z47 89                   Subjective: I am sore from activity       Objective: See treatment diary below      Assessment: Tolerated treatment fair  Patient has c/o pain in the wrist and forearm       Plan: Continue per plan of care  Precautions: YAMILETH, FDS (RF) to FPL 22- No restrictions      Manuals        IE 30'            retrograde 4m 4m 4m 4m 4m 4m 4m      Scar massage 4m 4m 4m 4m 4m 4m 4m                   Neuro Re-Ed                          Wrist- TGEs, thumb HEP reviewed  DBS thumb IP flex                                                                           Ther Ex             Wrist A/AROM 2m 2m 2m 2m 2m 2m 2m      AROM FPL- thumb  x10 x10 x10 x10 x10 x10      Wrist curls  Cone 3x10 Cone 3x10 Cone 3x10 Cone 3x10 #2 3x10 2# 3x10      Blocking IP flexion    2m 2m 2m 2m      therabar p/s      yellow 3x10 yellow  3x10      gripping      GPW 2x30 GPW  2x30      Pinch clips      Red 3 sets green   3 sets                   Ther Activity             opposition Small pegs Small pegs Small pegs Small pegs Small pegs        Wooden pegs   thumb flexion 2 sets 2 sets w  thumb flexion        Gait Training                                       Modalities             MH 10m 10m 10m 10m 10m 10m 10m      CP post 5m 5m     5m

## 2022-08-12 ENCOUNTER — OFFICE VISIT (OUTPATIENT)
Dept: OCCUPATIONAL THERAPY | Facility: CLINIC | Age: 62
End: 2022-08-12
Payer: MEDICARE

## 2022-08-12 DIAGNOSIS — Z47.89 AFTERCARE FOLLOWING SURGERY OF THE MUSCULOSKELETAL SYSTEM: Primary | ICD-10-CM

## 2022-08-12 PROCEDURE — 97110 THERAPEUTIC EXERCISES: CPT | Performed by: OCCUPATIONAL THERAPIST

## 2022-08-12 PROCEDURE — 97140 MANUAL THERAPY 1/> REGIONS: CPT | Performed by: OCCUPATIONAL THERAPIST

## 2022-08-12 NOTE — PROGRESS NOTES
Daily Note     Today's date: 2022  Patient name: Jeffery Caceres  : 1960  MRN: [id number]  Referring provider: MOON Collins*  Dx:   Encounter Diagnosis     ICD-10-CM    1  Aftercare following surgery-Removal of hardware volar aspect right distal radius, Right ring finger FDS to FPL tendon transfer  Z47 89                   Subjective: I am sore   I lifted a 2# bag      Objective: See treatment diary below      Assessment: Tolerated treatment fair   Patient has limited IP ROm thumb   Plan: Continue per plan of care  Precautions: YAMILETH, FDS (RF) to FPL 22- No restrictions      Manuals       IE 30'            retrograde 4m 4m 4m 4m 4m 4m 4m 4m     Scar massage 4m 4m 4m 4m 4m 4m 4m 4m                  Neuro Re-Ed                          Wrist- TGEs, thumb HEP reviewed  DBS thumb IP flex                                                                           Ther Ex             Wrist A/AROM 2m 2m 2m 2m 2m 2m 2m 2m     AROM FPL- thumb  x10 x10 x10 x10 x10 x10 x10     Wrist curls  Cone 3x10 Cone 3x10 Cone 3x10 Cone 3x10 #2 3x10 2# 3x10 2#  3x10     Blocking IP flexion    2m 2m 2m 2m      therabar p/s      yellow 3x10 yellow  3x10 yellow  3x10     gripping      GPW 2x30 GPW  2x30 BPW  2x30     Pinch clips      Red 3 sets green   3 sets Gr  3x10                  Ther Activity             opposition Small pegs Small pegs Small pegs Small pegs Small pegs         Wooden pegs   thumb flexion 2 sets 2 sets w  thumb flexion        Fxnl grasp        2 2#  3x10                               Modalities             MH 10m 10m 10m 10m 10m 10m 10m 10m     CP post 5m 5m     5m 5m

## 2022-08-16 ENCOUNTER — TELEPHONE (OUTPATIENT)
Dept: FAMILY MEDICINE CLINIC | Facility: HOSPITAL | Age: 62
End: 2022-08-16

## 2022-08-16 NOTE — TELEPHONE ENCOUNTER
Pt c/o bruising and swelling on hand near surgical site  Had sx 6/23, but states bruise is new and doesn't remember hurting it   PCB

## 2022-08-17 ENCOUNTER — TELEPHONE (OUTPATIENT)
Dept: OBGYN CLINIC | Facility: HOSPITAL | Age: 62
End: 2022-08-17

## 2022-08-17 ENCOUNTER — APPOINTMENT (OUTPATIENT)
Dept: OCCUPATIONAL THERAPY | Facility: CLINIC | Age: 62
End: 2022-08-17
Payer: MEDICARE

## 2022-08-17 ENCOUNTER — HOSPITAL ENCOUNTER (OUTPATIENT)
Dept: RADIOLOGY | Facility: HOSPITAL | Age: 62
Discharge: HOME | End: 2022-08-17
Payer: MEDICARE

## 2022-08-17 ENCOUNTER — HOSPITAL ENCOUNTER (OUTPATIENT)
Dept: MAMMOGRAPHY | Facility: IMAGING CENTER | Age: 62
Discharge: HOME | End: 2022-08-17
Payer: MEDICARE

## 2022-08-17 ENCOUNTER — OFFICE VISIT (OUTPATIENT)
Dept: FAMILY MEDICINE CLINIC | Facility: HOSPITAL | Age: 62
End: 2022-08-17
Payer: MEDICARE

## 2022-08-17 VITALS
HEIGHT: 67 IN | BODY MASS INDEX: 34.81 KG/M2 | SYSTOLIC BLOOD PRESSURE: 138 MMHG | OXYGEN SATURATION: 95 % | HEART RATE: 90 BPM | WEIGHT: 221.8 LBS | DIASTOLIC BLOOD PRESSURE: 80 MMHG

## 2022-08-17 DIAGNOSIS — M79.601 PAIN OF RIGHT UPPER EXTREMITY: ICD-10-CM

## 2022-08-17 DIAGNOSIS — F11.20 CONTINUOUS OPIOID DEPENDENCE (HCC): Primary | ICD-10-CM

## 2022-08-17 DIAGNOSIS — Z12.31 ENCOUNTER FOR SCREENING MAMMOGRAM FOR BREAST CANCER: ICD-10-CM

## 2022-08-17 PROCEDURE — 77067 SCR MAMMO BI INCL CAD: CPT

## 2022-08-17 PROCEDURE — 99213 OFFICE O/P EST LOW 20 MIN: CPT | Performed by: NURSE PRACTITIONER

## 2022-08-17 PROCEDURE — 73130 X-RAY EXAM OF HAND: CPT

## 2022-08-17 PROCEDURE — 77063 BREAST TOMOSYNTHESIS BI: CPT

## 2022-08-17 PROCEDURE — 73110 X-RAY EXAM OF WRIST: CPT

## 2022-08-17 RX ORDER — TRAMADOL HYDROCHLORIDE 50 MG/1
50 TABLET ORAL EVERY 8 HOURS PRN
Qty: 30 TABLET | Refills: 0 | Status: SHIPPED | OUTPATIENT
Start: 2022-08-17 | End: 2022-08-30 | Stop reason: SDUPTHER

## 2022-08-17 NOTE — TELEPHONE ENCOUNTER
I spoke to Dr Kamran Ellis  Patient may return back into her brace and we will see her in the office within the next week  Please schedule thanks!

## 2022-08-17 NOTE — TELEPHONE ENCOUNTER
Since she received a brace that her insurance company billed already, she will have to pay full cost for the new one  She can get one OTC from a pharmacy if she would prefer

## 2022-08-17 NOTE — TELEPHONE ENCOUNTER
I spoke with patient and she states that she did have a fall after her surgery that resulted in some swelling of the hand  Please take a look at the xray -  Slightly irregular ulnar styloid with questionable linear lucency at its base, suggesting the possibility of a tiny nondisplaced fracture  Correlate with point tenderness        Old healed distal radial fracture      The study was marked in EPIC for immediate notification        I advised patient via voicemail as her phone was acting up when she called in   to put her thumb spica brace back on [facility] and we would reach out tomorrow  Asked her to call back with receipt of this msg  Please advise

## 2022-08-17 NOTE — TELEPHONE ENCOUNTER
Patient is calling to speak with someone on  [facility] team about pain and swelling and new xrays done by her PCP today      Transferred to the triage nurse at [phone]

## 2022-08-17 NOTE — TELEPHONE ENCOUNTER
Patient states she does not have a brace  Patient left at the office   Hello,  Please advise if the following patient can be forced onto the schedule:    Patient: Aby Pearce    : 1960    MRN: [id number]    Call back #: [phone]    Insurance: Medicare     Reason for appointment: Pain R wrist radiating up arm /  old healed distal radial fracture    Requested doctor/location: Seleta Habermann / [place]       Thank you   On

## 2022-08-17 NOTE — PROGRESS NOTES
[facility]    Assessment/Plan:      Diagnosis ICD-10-CM Associated Orders   1  Continuous opioid dependence ([address] )  F11 20    2  Pain of right upper extremity  M79 601 traMADol (Ultram) 50 mg tablet     XR wrist 3+ vw right     XR hand 3+ vw right      Ice x20 minutes up to four times a day   Scheduled tylenol, use the tramadol sparingly as it can cause bruising/bleeding risk   Obtain xrays, reschedule OT for today until xray resulted  Return for make sure 10/22 is for AWV   Patient may call or return to office with any questions or concerns  ______________________________________________________________________  Subjective:     Patient ID: Griffin Chase is a 63 y o  female  Right hand ecchymosis with swelling,pain  Scattered ecchymotic spots up arm to elbow as well as ecchymotic spots on BLE  Unsure of any injury however has gait disturbance  Associated symptoms include burning/numbness/tingling  Used ice and tylenol without relief  Hx fractured wrist 10/2021 with frequent falls, last surgery June of this year  Has been taking the tramadol every 8-12 hours  Scheduled to go to OT appointment today for wrist therapy    Griffin Chase  Chief Complaint   Patient presents with    Hand Pain     PT woke Sunday with a swollen right hand - hand is black/blue - has spots going up her arm and pain radiates all the way up to her shoulder  PT had surgery on her wrist on 6/23  The following portions of the patient's history were reviewed and updated as appropriate: allergies, current medications, past family history, past medical history, past social history, past surgical history and problem list     Review of Systems   Constitutional: Negative  Negative for activity change, appetite change, chills, fatigue and fever  HENT: Negative  Negative for congestion, ear pain, postnasal drip and sinus pain  Eyes: Negative  Respiratory: Negative  Negative for cough and shortness of breath  Cardiovascular: Negative  Negative for chest pain and leg swelling  Gastrointestinal: Negative  Negative for constipation and diarrhea  Endocrine: Negative  Genitourinary: Negative  Negative for dysuria  Musculoskeletal: Positive for gait problem  Skin: Positive for color change  Allergic/Immunologic: Negative  Negative for immunocompromised state  Neurological: Positive for weakness  Negative for dizziness and light-headedness  Hematological: Bruises/bleeds easily  Psychiatric/Behavioral: Negative  Objective:      Vitals:    08/17/22 1136   BP: 138/80   Pulse:    SpO2:       Physical Exam  Vitals and nursing note reviewed  Constitutional:       Appearance: Normal appearance  HENT:      Head: Normocephalic and atraumatic  Right Ear: Tympanic membrane, ear canal and external ear normal       Left Ear: Tympanic membrane, ear canal and external ear normal       Nose: Nose normal       Mouth/Throat:      Mouth: Mucous membranes are moist       Pharynx: Oropharynx is clear  Eyes:      Extraocular Movements: Extraocular movements intact  Conjunctiva/sclera: Conjunctivae normal       Pupils: Pupils are equal, round, and reactive to light  Cardiovascular:      Rate and Rhythm: Normal rate and regular rhythm  Pulses: Normal pulses  Heart sounds: Murmur heard  Pulmonary:      Effort: Pulmonary effort is normal       Breath sounds: Normal breath sounds  Abdominal:      General: Bowel sounds are normal       Palpations: Abdomen is soft  Musculoskeletal:         General: Swelling and tenderness present  Normal range of motion  Cervical back: Normal range of motion and neck supple  Right lower leg: No edema  Left lower leg: No edema  Comments: +right carpal tenderness  Limited flexion with pain     Skin:     General: Skin is warm and dry  Findings: Bruising present  No erythema or rash  Neurological:      General: No focal deficit present  Mental Status: She is alert and oriented to person, place, and time  Psychiatric:         Mood and Affect: Mood normal          Behavior: Behavior normal          Thought Content: Thought content normal          Judgment: Judgment normal            Portions of the record may have been created with voice recognition software  Occasional wrong word or "sound alike" substitutions may have occurred due to the inherent limitations of voice recognition software  Please review the chart carefully and recognize, using context, where substitutions/typographical errors may have occurred

## 2022-08-18 ENCOUNTER — DOCUMENTATION (OUTPATIENT)
Dept: OBGYN CLINIC | Facility: CLINIC | Age: 62
End: 2022-08-18

## 2022-08-18 ENCOUNTER — TELEPHONE (OUTPATIENT)
Dept: OBGYN CLINIC | Facility: CLINIC | Age: 62
End: 2022-08-18

## 2022-08-18 NOTE — TELEPHONE ENCOUNTER
Did not find brace here  Her visit notes said to D/C wearing it  I called pt and left msg that I will check with Misty/Anais on Monday the 22nd and see if they remember a brace being left here  I will let pt know

## 2022-08-18 NOTE — TELEPHONE ENCOUNTER
Patient seen on 8/1/22 , she left her brace in the office   Called [facility] and they were checking  Call disconnected        Please call her back if found       Cb#: [phone]

## 2022-08-18 NOTE — TELEPHONE ENCOUNTER
Patient is asking what she should do since she got an xray done by PCP and there is a fx noted there that was not there before  She asked if she should continue OT  Advised that she needs to likely come in and see us before we can advise on a new fx  Please advise

## 2022-08-19 ENCOUNTER — APPOINTMENT (OUTPATIENT)
Dept: OCCUPATIONAL THERAPY | Facility: CLINIC | Age: 62
End: 2022-08-19
Payer: MEDICARE

## 2022-08-22 ENCOUNTER — OFFICE VISIT (OUTPATIENT)
Dept: OBGYN CLINIC | Facility: CLINIC | Age: 62
End: 2022-08-22
Payer: MEDICARE

## 2022-08-22 VITALS
WEIGHT: 221 LBS | HEIGHT: 67 IN | SYSTOLIC BLOOD PRESSURE: 118 MMHG | DIASTOLIC BLOOD PRESSURE: 76 MMHG | BODY MASS INDEX: 34.69 KG/M2

## 2022-08-22 DIAGNOSIS — S52.614A CLOSED NONDISPLACED FRACTURE OF STYLOID PROCESS OF RIGHT ULNA, INITIAL ENCOUNTER: Primary | ICD-10-CM

## 2022-08-22 PROCEDURE — 25650 CLTX ULNAR STYLOID FRACTURE: CPT | Performed by: ORTHOPAEDIC SURGERY

## 2022-08-22 PROCEDURE — 99024 POSTOP FOLLOW-UP VISIT: CPT | Performed by: ORTHOPAEDIC SURGERY

## 2022-08-22 RX ORDER — NAPROXEN SODIUM 220 MG
220 TABLET ORAL 2 TIMES DAILY WITH MEALS
Status: CANCELLED | OUTPATIENT
Start: 2022-08-22

## 2022-08-22 NOTE — PROGRESS NOTES
Assessment:   Aprox  2 months S/P Removal of hardware volar aspect right distal radius (distal radial plate and screws) - Right, Right ring finger flexor digitorum superficialis to flexor pollicis longus tendon transfer - Right, and Application short-arm thumb spica splint - Right on 6/23/2022    New injury, right ulnar styloid fracture, DOI 8/10/22    Plan:   X-rays were reviewed   Treatment options were discussed being continued immobilization in cock up wrist brace vs short arm cast   Olocity was placed into a short arm cast   She will be immobilized for 6 weeks time   Cast restrictions/cast care was reviewed   Advised to continue to work on thumb and finger ROM at home  Continue Tylenol for pain control, unable to take NSAID's and is currently on Tramadol    Follow up in 6 weeks time for cast removal and right wrist x-rays     Follow Up:  6  week(s)    To Do Next Visit:  X-rays of the  right  wrist, Cast off and Cast/splint off prior to x-ray      CHIEF COMPLAINT:  Chief Complaint   Patient presents with    Right Wrist - Post-op     S/P Removal of hardware volar aspect right distal radius (distal radial plate and screws) - Right ring finger flexor digitorum superficialis to flexor pollicis longus tendon transfer -6/23/2022  Xr 8/17/22         SUBJECTIVE:  Gisselle Templeton is a 63 y o  female who presents for follow up after Removal of hardware volar aspect right distal radius (distal radial plate and screws) - Right, Right ring finger flexor digitorum superficialis to flexor pollicis longus tendon transfer - Right, and Application short-arm thumb spica splint - Right on 6/23/2022  Olocity had a fall on 8/10/22  Her PCP ordered an x-ray which was performed on 8/17/22  She notes pain, swelling and bruising to the dorsal ulnar aspect of her wrist  She notes "popping" to this area at rest  She has been immobilized in a cock up wrist brace that she purchased OTC last week   She feels pain is worsening as it is now radiating up her arm  She is taking Tylenol for pain control  PHYSICAL EXAMINATION:  Vital signs: /76   Ht 5' 7" (1 702 m)   Wt 100 kg (221 lb)   LMP  (LMP Unknown)   BMI 34 61 kg/m²   General: well developed and well nourished, alert, oriented times 3 and appears comfortable  Psychiatric: Normal    MUSCULOSKELETAL EXAMINATION:  Incision: healed  Range of Motion: as expected, able to flex and extend all digits with stiffness with regards to flexion   Neurovascular status: Neuro intact, good cap refill  Activity Restrictions: Cast/splint restrictions  Tender to palpation over ulnar styloid   Good finger extension       STUDIES REVIEWED:  Images were reviewed in PACS by Dr Gerry Smith and demonstrate: right ulnar styloid fracture, nondisplaced       PROCEDURES PERFORMED:  Fracture / Dislocation Treatment    Date/Time: 8/22/2022 5:16 PM  Performed by: Petros Montesinos MD  Authorized by: Petros Montesinos MD     Patient Location:  [facility]  [place] Protocol:  Consent: Verbal consent obtained  Written consent not obtained  Risks and benefits: risks, benefits and alternatives were discussed  Consent given by: patient  Time out: Immediately prior to procedure a "time out" was called to verify the correct patient, procedure, equipment, support staff and site/side marked as required    Patient identity confirmed: verbally with patient      Injury location:  Wrist  Location details:  Right wrist  Injury type:  Fracture  Fracture type: ulnar styloid    Fracture type: ulnar styloid    Neurovascular status: Neurovascularly intact    Local anesthesia used?: No    Manipulation performed?: No    Cast type:  Short arm  Supplies used:  Cotton padding and fiberglass  Neurovascular status: Neurovascularly intact    Patient tolerance:  Patient tolerated the procedure well with no immediate complications           Scribe Attestation    I,:  Little Haile am acting as a scribe while in the presence of the attending physician :       I,:  Ulysses Silverman MD personally performed the services described in this documentation    as scribed in my presence :

## 2022-08-23 ENCOUNTER — TELEPHONE (OUTPATIENT)
Dept: NEPHROLOGY | Facility: CLINIC | Age: 62
End: 2022-08-23

## 2022-08-23 ENCOUNTER — TELEPHONE (OUTPATIENT)
Dept: OBGYN CLINIC | Facility: HOSPITAL | Age: 62
End: 2022-08-23

## 2022-08-23 NOTE — TELEPHONE ENCOUNTER
We discussed at her visit yesterday that she could continue to take tylenol as needed (no NSAIDs) and her baseline tramadol  Does she need a refill on the Tylenol?

## 2022-08-23 NOTE — TELEPHONE ENCOUNTER
Dr Nallely Rodriguez:  Pain Medication  #  [phone]    Patient states that she cannot take Naproxsyn and needs a Tylenol based pain medication  Please advise      Russ [facility], [address] [place] [facility]   [phone]

## 2022-08-23 NOTE — TELEPHONE ENCOUNTER
Spoke to patient  Advised she needs to call PCP for a tramadol refill if she is running low  Advised plain tylenol with the tramadol  No NSAIDs if unable to use  Patient stated she does not need a tylenol refill, she has them at home   Thank you

## 2022-08-23 NOTE — TELEPHONE ENCOUNTER
Patient called requesting to schedule a follow up appt with Dr Lindsey Abreu - advised patient she does have an appt scheduled with our office for 11/1 with Edwin Sotelo in [address] [place] [address] L[place]

## 2022-08-24 ENCOUNTER — APPOINTMENT (OUTPATIENT)
Dept: OCCUPATIONAL THERAPY | Facility: CLINIC | Age: 62
End: 2022-08-24
Payer: MEDICARE

## 2022-08-25 DIAGNOSIS — M20.5X1 CLAW TOE, ACQUIRED, RIGHT: ICD-10-CM

## 2022-08-25 DIAGNOSIS — S99.921A INJURY OF PLANTAR PLATE, RIGHT, INITIAL ENCOUNTER: ICD-10-CM

## 2022-08-25 DIAGNOSIS — M20.11 ACQUIRED HALLUX INTERPHALANGEUS, RIGHT: ICD-10-CM

## 2022-08-25 RX ORDER — ONDANSETRON 4 MG/1
4 TABLET, FILM COATED ORAL EVERY 8 HOURS PRN
Qty: 60 TABLET | Refills: 1 | Status: SHIPPED | OUTPATIENT
Start: 2022-08-25 | End: 2022-10-11 | Stop reason: SDUPTHER

## 2022-08-26 ENCOUNTER — APPOINTMENT (OUTPATIENT)
Dept: OCCUPATIONAL THERAPY | Facility: CLINIC | Age: 62
End: 2022-08-26
Payer: MEDICARE

## 2022-08-29 ENCOUNTER — TELEPHONE (OUTPATIENT)
Dept: OBGYN CLINIC | Facility: HOSPITAL | Age: 62
End: 2022-08-29

## 2022-08-29 ENCOUNTER — TELEPHONE (OUTPATIENT)
Dept: NEPHROLOGY | Facility: CLINIC | Age: 62
End: 2022-08-29

## 2022-08-29 NOTE — TELEPHONE ENCOUNTER
Patient called stated her PCP will not fill her Tramadol  She asked if the doctor refilled her medication, she was told no

## 2022-08-30 ENCOUNTER — TELEPHONE (OUTPATIENT)
Dept: FAMILY MEDICINE CLINIC | Facility: HOSPITAL | Age: 62
End: 2022-08-30

## 2022-08-30 DIAGNOSIS — M79.601 PAIN OF RIGHT UPPER EXTREMITY: ICD-10-CM

## 2022-08-30 RX ORDER — TRAMADOL HYDROCHLORIDE 50 MG/1
50 TABLET ORAL EVERY 8 HOURS PRN
Qty: 30 TABLET | Refills: 0 | Status: SHIPPED | OUTPATIENT
Start: 2022-08-30 | End: 2022-09-14 | Stop reason: SDUPTHER

## 2022-08-31 ENCOUNTER — APPOINTMENT (OUTPATIENT)
Dept: OCCUPATIONAL THERAPY | Facility: CLINIC | Age: 62
End: 2022-08-31
Payer: MEDICARE

## 2022-08-31 ENCOUNTER — APPOINTMENT (OUTPATIENT)
Dept: LAB | Facility: HOSPITAL | Age: 62
End: 2022-08-31
Attending: INTERNAL MEDICINE
Payer: MEDICARE

## 2022-08-31 DIAGNOSIS — E78.00 HYPERCHOLESTEREMIA: ICD-10-CM

## 2022-08-31 DIAGNOSIS — N18.4 CKD (CHRONIC KIDNEY DISEASE) STAGE 4, GFR 15-29 ML/MIN (HCC): ICD-10-CM

## 2022-08-31 LAB
ANION GAP SERPL CALCULATED.3IONS-SCNC: 4 MMOL/L (ref 4–13)
BUN SERPL-MCNC: 47 MG/DL (ref 5–25)
CALCIUM SERPL-MCNC: 9.6 MG/DL (ref 8.3–10.1)
CHLORIDE SERPL-SCNC: 111 MMOL/L (ref 96–108)
CHOLEST SERPL-MCNC: 263 MG/DL
CO2 SERPL-SCNC: 26 MMOL/L (ref 21–32)
CREAT SERPL-MCNC: 2.74 MG/DL (ref 0.6–1.3)
GFR SERPL CREATININE-BSD FRML MDRD: 18 ML/MIN/1.73SQ M
GLUCOSE P FAST SERPL-MCNC: 117 MG/DL (ref 65–99)
HDLC SERPL-MCNC: 48 MG/DL
LDLC SERPL CALC-MCNC: 143 MG/DL (ref 0–100)
NONHDLC SERPL-MCNC: 215 MG/DL
POTASSIUM SERPL-SCNC: 4.8 MMOL/L (ref 3.5–5.3)
SODIUM SERPL-SCNC: 141 MMOL/L (ref 135–147)
TRIGL SERPL-MCNC: 358 MG/DL

## 2022-08-31 PROCEDURE — 36415 COLL VENOUS BLD VENIPUNCTURE: CPT

## 2022-08-31 PROCEDURE — 80048 BASIC METABOLIC PNL TOTAL CA: CPT

## 2022-08-31 PROCEDURE — 80061 LIPID PANEL: CPT

## 2022-09-01 ENCOUNTER — TELEPHONE (OUTPATIENT)
Dept: NEPHROLOGY | Facility: CLINIC | Age: 62
End: 2022-09-01

## 2022-09-01 NOTE — TELEPHONE ENCOUNTER
----- Message from Alexandr Calix DO sent at 9/1/2022  9:23 AM EDT -----  Her labs were reviewed her creatinine is stable at 2 7 for, electrolytes are stable  Please make sure she has transplant evaluation scheduled

## 2022-09-01 NOTE — TELEPHONE ENCOUNTER
I spoke to Tony , she is aware that labs are stable at this time no changes are to be made  She had no questions or concerns  Patient is scheduled for December 13,2022 for her transplant eval with Dr Clayton

## 2022-09-02 DIAGNOSIS — E78.00 HYPERCHOLESTEREMIA: ICD-10-CM

## 2022-09-06 ENCOUNTER — TELEPHONE (OUTPATIENT)
Dept: OBGYN CLINIC | Facility: HOSPITAL | Age: 62
End: 2022-09-06

## 2022-09-06 NOTE — TELEPHONE ENCOUNTER
Patient is calling to speak with Dr Adriana Tello in reference to a recent fall  Patient would like to know if she should go get another xray of the right wrist  Patient feels as though she may have re injured the right worst       Please advise      Prachi Christine  [phone]

## 2022-09-06 NOTE — TELEPHONE ENCOUNTER
Patient given above information and verbalized understanding  If her symptoms get worse she will go to ER for evaluation she states

## 2022-09-07 ENCOUNTER — TELEPHONE (OUTPATIENT)
Dept: FAMILY MEDICINE CLINIC | Facility: HOSPITAL | Age: 62
End: 2022-09-07

## 2022-09-07 NOTE — TELEPHONE ENCOUNTER
It appears set Ortho/Dr Bolivar Simeon has already addressed this- she has a fracture and is n in a splint- did she fall again on the arm

## 2022-09-07 NOTE — TELEPHONE ENCOUNTER
Patient would like to get an xray done of her right wrist up arm  Looks like she put a call into ortho about this as well   Can we put order in?

## 2022-09-08 ENCOUNTER — HOSPITAL ENCOUNTER (OUTPATIENT)
Dept: RADIOLOGY | Facility: HOSPITAL | Age: 62
Discharge: HOME | End: 2022-09-08
Attending: INTERNAL MEDICINE
Payer: MEDICARE

## 2022-09-08 DIAGNOSIS — M79.601 PAIN OF RIGHT UPPER EXTREMITY: ICD-10-CM

## 2022-09-08 DIAGNOSIS — M79.601 PAIN OF RIGHT UPPER EXTREMITY: Primary | ICD-10-CM

## 2022-09-08 PROCEDURE — 73110 X-RAY EXAM OF WRIST: CPT

## 2022-09-08 NOTE — TELEPHONE ENCOUNTER
According to ortho message she fell again  They advised that she go to ER if symptoms worsen and keep follow up appointment in October

## 2022-09-09 ENCOUNTER — TELEPHONE (OUTPATIENT)
Dept: FAMILY MEDICINE CLINIC | Facility: HOSPITAL | Age: 62
End: 2022-09-09

## 2022-09-14 ENCOUNTER — TELEPHONE (OUTPATIENT)
Dept: OBGYN CLINIC | Facility: HOSPITAL | Age: 62
End: 2022-09-14

## 2022-09-14 DIAGNOSIS — M79.601 PAIN OF RIGHT UPPER EXTREMITY: ICD-10-CM

## 2022-09-14 DIAGNOSIS — K59.00 CONSTIPATION, UNSPECIFIED CONSTIPATION TYPE: ICD-10-CM

## 2022-09-14 RX ORDER — LINACLOTIDE 290 UG/1
290 CAPSULE, GELATIN COATED ORAL DAILY
Qty: 90 CAPSULE | Refills: 0 | Status: SHIPPED | OUTPATIENT
Start: 2022-09-14 | End: 2022-12-13

## 2022-09-14 NOTE — TELEPHONE ENCOUNTER
DR Kamran Ellis and Cody Hsu PA-C  RE: cast is falling apart  CB:     Patient called stating that her cast has been falling apart and she has been utilizing crazy glue on the cast   Is it possible for [facility] to see patient to evaluate cast   There is currently same day appt available tomorrow   Caller asked to speak to clinical team

## 2022-09-15 ENCOUNTER — OFFICE VISIT (OUTPATIENT)
Dept: OBGYN CLINIC | Facility: CLINIC | Age: 62
End: 2022-09-15
Payer: MEDICARE

## 2022-09-15 DIAGNOSIS — Z47.89 ENCOUNTER FOR CAST CHANGE: Primary | ICD-10-CM

## 2022-09-15 PROCEDURE — 29075 APPL CST ELBW FNGR SHORT ARM: CPT | Performed by: PHYSICIAN ASSISTANT

## 2022-09-15 PROCEDURE — 99024 POSTOP FOLLOW-UP VISIT: CPT | Performed by: PHYSICIAN ASSISTANT

## 2022-09-15 RX ORDER — TRAMADOL HYDROCHLORIDE 50 MG/1
50 TABLET ORAL EVERY 8 HOURS PRN
Qty: 30 TABLET | Refills: 0 | Status: SHIPPED | OUTPATIENT
Start: 2022-09-15 | End: 2022-09-29 | Stop reason: SDUPTHER

## 2022-09-15 NOTE — PROGRESS NOTES
Orthopaedic Surgery - Office Note  Ishaan Staples (21 y o  female)   : 1960   MRN: [id number]  Encounter Date: 9/15/2022    No chief complaint on file  Assessment/Plan  Diagnoses and all orders for this visit:    Encounter for cast change    Other orders  -     Cast application         Return for Recheck-as schedule with Dr Deb Aquino  History of Present Illness  Patient reports she is here today for a cast change  She states that her previous cast has been falling apart and she has been trying to use glue to hold it together  She reports she called into the office and was advised to come in today to get her cast changed  Patient denies any complications  Patient has a history of S/P Removal of hardware volar aspect right distal radius (distal radial plate and screws) - Right, Right ring finger flexor digitorum superficialis to flexor pollicis longus tendon transfer - Right, and Application short-arm thumb spica splint - Right on 2022 with a new ulnar styloid fracture on 08/10/2022  The plan was for patient to be in 6 weeks of a short-arm cast       Review of Systems  Pertinent items are noted in HPI  All other systems were reviewed and are negative  Physical Exam  LMP  (LMP Unknown)   Cons: Appears well  No apparent distress  Psych: Alert  Oriented x3  Mood and affect normal   Eyes: PERRLA, EOMI  Resp: Normal effort  No audible wheezing or stridor  CV: Palpable pulse  No discernable arrhythmia  Lymph:  No palpable cervical, axillary, or inguinal lymphadenopathy  Skin: Warm  No palpable masses  No visible lesions  Neuro: Normal muscle tone  Normal and symmetric DTR's  Patient's right cast is deteriorated removed today no signs for infection  Cast was reapplied and she will follow up as scheduled with Dr Adrienne Hurst  Previous orthopedic notes and phone messages were reviewed by myself in the office today      Cast application    Date/Time: 9/15/2022 4:13 PM  Performed by: Marylene Hun, MA  Authorized by: Rehan Pham PA-C   Universal Protocol:  Consent: Verbal consent obtained  Risks and benefits: risks, benefits and alternatives were discussed  Consent given by: patient  Time out: Immediately prior to procedure a "time out" was called to verify the correct patient, procedure, equipment, support staff and site/side marked as required  Patient understanding: patient states understanding of the procedure being performed  Patient consent: the patient's understanding of the procedure matches consent given  Relevant documents: relevant documents present and verified  Test results: test results available and properly labeled  Site marked: the operative site was marked  Radiology Images displayed and confirmed  If images not available, report reviewed: imaging studies available  Patient identity confirmed: verbally with patient      Pre-procedure details:     Sensation:  Normal  Procedure details:     Laterality:  Right    Location:  Wrist    Wrist:  R wristCast type:  Short arm    Post-procedure details:     Pain:  Improved    Sensation:  Normal    Patient tolerance of procedure: Tolerated well, no immediate complications      Medical, Surgical, Family, and Social History  The patient's medical history, family history, and social history, were reviewed and updated as appropriate      Past Medical History:   Diagnosis Date    Anxiety     Anxiety disorder     At risk for falls     Bipolar 2 disorder (HCC)     Chronic back pain     Chronic kidney disease     Closed fracture of distal end of right fibula with routine healing 11/04/2020    COVID-19     in Jan 2021    CVA [facility]re[facility] vascular accident[facility])     noted on MRI in the past    Depression     GERD (gastroesophageal reflux disease)     Hypercholesteremia     Hypernatremia     Hypertension     Hypokalemia     Idiopathic chronic pancreatitis ([facility] [address] ) 03/20/2018    Intervertebral disc disorder with radiculopathy of lumbosacral region     resolved: 2015    Kidney disease     Limb alert care status     LUE-fistula    Panic attacks     Pericardial effusion     PONV (postoperative nausea and vomiting)     Radiculitis     resolved: 2015    Secondary renal hyperparathyroidism ([address] )     Vitamin D deficiency        Past Surgical History:   Procedure Laterality Date    BUNIONECTOMY      Left foot     CAST APPLICATION Right     Procedure: Application short-arm thumb spica splint;  Surgeon: Leopold Mitts, MD;  Location: [facility]IN [facility];  Service: Orthopedics    COLON SURGERY      COLONOSCOPY  2021    DILATION AND CURETTAGE OF UTERUS      INDUCED       surgically induced    PA ANASTOMOSIS,AV,ANY SITE Left 2019    Procedure: CREATION FISTULA ARTERIOVENOUS (AV) left wrists possible left upper;  Surgeon: Con Clark MD;  Location:  MAIN OR;  Service: Vascular    PA [facility] W[facility]DIST [facility] Left 2019    Procedure: Richardean Dubin;  Surgeon: Nadja Murphy DPM;  Location:  MAIN OR;  Service: [address] [place] [address] W/[facility] W/DIST [facility] Right 8/3/2020    Procedure: Margie Walker;  Surgeon: Nadja Murphy DPM;  Location:  MAIN OR;  Service: Podiatry    PA [facility] [place] [address] OSTEOT Right 2021    Procedure: Maci Melena, right tameka osteotomy and 2nd claw toe correction;  Surgeon: Andrzej Garces MD;  Location:  MAIN OR;  Service: Orthopedics    PA ERCP DX COLLECTION SPECIMEN BRUSHING/WASHING N/A 2018    Procedure: ENDOSCOPIC RETROGRADE CHOLANGIOPANCREATOGRAPHY (ERCP);   Surgeon: Fatou Wills MD;  Location: [facility] MAIN [facility];  Service: Gastroenterology    PA FINGER TENDON XNLTFIUP,7-3 FINGRS Right 2022    Procedure: Right ring finger flexor digitorum superficialis to flexor pollicis longus tendon transfer;  Surgeon: Leopold Mitts, MD;  Location:  MAIN OR;  Service: Orthopedics    [place] LAP, SURG PROCTOPEXY N/A 7/13/2018    Procedure: ROBOTIC SIGMOID RESECTION / RECTOPEXY;  Surgeon: Julieta Perez MD;  [facility];  Service: Colorectal    NJ OPEN TREATMENT RADIAL SHAFT FRACTURE Right 10/11/2021    Procedure: OPEN REDUCTION W/ INTERNAL FIXATION (ORIF) RADIUS (WRIST), RIGHT DISTAL;  Surgeon: Ted Lyle MD;  Location: [facility];  Service: Orthopedics    NJ REMOVAL DEEP IMPLANT Right 6/23/2022    Procedure: Removal of hardware volar aspect right distal radius (distal radial plate and screws);   Surgeon: Kellie Lincoln MD;  [facility]: [facility];  Service: Orthopedics    NJ SIGMOIDOSCOPY FLX DX W/COLLJ SPEC BR/WA IF PFRMD N/A 7/13/2018    Procedure: Isiah Garcia;  Surgeon: Julieta Perez MD;  [facility];  Service: Colorectal    TUBAL LIGATION Bilateral [facility] THYROID BIOPSY  7/30/2019       Family History   Problem Relation Age of Onset    Bipolar disorder Mother     Mental illness Mother         depression    Stroke Mother     Dementia Mother     Colon polyps Mother     Heart disease Father     Hypertension Father     Diabetes Father     Other Family         Back disorder    Diabetes Family     Heart disease Family     Hypertension Family     Stroke Family     Thyroid disease Family     Breast cancer Paternal [de-identified]         age unknown   Ernesto Pert Breast cancer Paternal [de-identified]         age unknown   Ernesto Pert Breast cancer Maternal Aunt         age unknown    Mental illness Sister     Colon polyps Sister     Mental illness Sister     Heart disease Sister     No Known Problems Sister     Breast cancer Sister 76    Other Son         pituitary tumor    Hypertension Son     Obesity Son     No Known Problems Son     No Known Problems Maternal Grandmother     No Known Problems Maternal Grandfather     No Known Problems Paternal Grandfather     Breast cancer Paternal Aunt         age unknown   Ernesto Pert Substance Abuse Neg Hx         neg fam hx    Colon cancer Neg Hx        Social History     Occupational History    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   Tobacco Use    Smoking status: Never Smoker    Smokeless tobacco: Never Used   Vaping Use    Vaping Use: Never used   Substance and Sexual Activity    Alcohol use: Never    Drug use: Not Currently    Sexual activity: Not Currently       Allergies   Allergen Reactions    Pollen Extract Nasal Congestion         Current Outpatient Medications:     acetaminophen (TYLENOL) 650 mg CR tablet, Take 1 tablet (650 mg total) by mouth every 8 (eight) hours as needed for mild pain, Disp: 30 tablet, Rfl: 0    albuterol (Ventolin HFA) 90 mcg/act inhaler, Inhale 2 puffs every 6 (six) hours as needed for wheezing or shortness of breath, Disp: 8 5 g, Rfl: 3    AMILoride 5 mg tablet, Take 1 tablet (5 mg total) by mouth 2 (two) times a day, Disp: 180 tablet, Rfl: 3    aspirin 81 mg chewable tablet, Chew 1 tablet (81 mg total) daily, Disp: , Rfl:     carvedilol (COREG) 25 mg tablet, Take 1 tablet (25 mg total) by mouth 2 (two) times a day with meals, Disp: 180 tablet, Rfl: 3    cholecalciferol (VITAMIN D3) 1,000 units tablet, Take 5 tablets (5,000 Units total) by mouth daily, Disp: 90 tablet, Rfl: 5    clonazePAM (KlonoPIN) 0 5 mg tablet, Take 1 tablet (0 5 mg total) by mouth 3 (three) times a day, Disp: 270 tablet, Rfl: 0    fluvoxaMINE (LUVOX) 100 mg tablet, 2 (two) times a day 1 tab am , 2 tabs HS, Disp: , Rfl:     lamoTRIgine (LaMICtal) 200 MG tablet, Take 1 tablet (200 mg total) by mouth 2 (two) times a day, Disp: 60 tablet, Rfl: 1    linaCLOtide (Linzess) 290 MCG CAPS, Take 1 capsule by mouth daily, Disp: 90 capsule, Rfl: 0    naloxone (NARCAN) 4 mg/0 1 mL nasal spray, Administer 1 spray into a nostril   If no response after 2-3 minutes, give another dose in the other nostril using a new spray , Disp: 1 each, Rfl: 1    omeprazole (PriLOSEC) 40 MG capsule, Take 1 capsule (40 mg total) by mouth daily before breakfast, Disp: 90 capsule, Rfl: 3    ondansetron (ZOFRAN) 4 mg tablet, Take 1 tablet (4 mg total) by mouth every 8 (eight) hours as needed for nausea or vomiting, Disp: 60 tablet, Rfl: 1    Polyethylene Glycol 3350 (MIRALAX PO), Take by mouth as needed , Disp: , Rfl:     QUEtiapine (SEROquel) 100 mg tablet, Take 1 tablet by mouth daily at bedtime 100mg with 400mg bedtime , Disp: , Rfl:     QUEtiapine (SEROquel) 300 mg tablet, Take 300 mg by mouth in the morning 300mg in morning , Disp: , Rfl:     QUEtiapine (SEROquel) 400 MG tablet, Take 400 mg by mouth daily at bedtime  , Disp: , Rfl:     rosuvastatin (CRESTOR) 20 MG tablet, Take 1 tablet (20 mg total) by mouth every evening, Disp: 90 tablet, Rfl: 3    traMADol (Ultram) 50 mg tablet, Take 1 tablet (50 mg total) by mouth every 8 (eight) hours as needed for moderate pain, Disp: 30 tablet, Rfl: 0      Jung Jonas PA-C

## 2022-09-15 NOTE — PATIENT INSTRUCTIONS
Cast Care   WHAT YOU NEED TO KNOW:   Cast care will help the cast dry and harden correctly, and then protect it until it comes off  Your cast may need up to 48 hours to dry and harden completely  Even after your cast hardens, it can be damaged  DISCHARGE INSTRUCTIONS:   Return to the emergency department if:   Your cast breaks or gets damaged  You see drainage, or your cast is stained or smells bad  Your skin turns blue or pale  Your skin tingles, burns, or is cold or numb  You have severe pain that is getting worse and does not go away after you take pain medicine  Your limb swells, or your cast looks or feels tighter than it was before  Contact your healthcare provider if:   Something falls into your cast and gets stuck  You have itching, pain, burning, or weakness where you have the cast      You have a fever  You have sores, blisters, or breaks on the skin around the edges of the cast     You have questions or concerns about your condition or care  Follow up with your healthcare provider as directed: You will need to return to have your cast removed and your bones checked  Write down your questions so you remember to ask them during your visits  Care for your cast while it hardens:   Protect the cast   Do not put weight on the cast  Do not bend, lean on, or hit the cast with anything  Use the palms of your hands when you move the cast  Do not use your fingers  Your fingers may leave marks on the cast as it dries  Change positions often  Change your position every 2 hours to help the cast dry faster  Prop your cast on something soft, such as a pillow, to prevent a flat area on your cast      Keep the cast dry  Tie plastic trash bags around your cast to keep it dry while you bathe  You may use a blow dryer on cool or the lowest heat setting to dry your cast if it gets wet  Do not use a high heat setting, because you may burn your skin  Certain casts can get wet   Ask if you have a waterproof cast     Care for your cast after it hardens:   Check your cast every day  Contact your healthcare provider if you notice any cracks, dents, holes, or flaking on your cast      Keep your cast clean and dry  Cover your cast with a towel when you eat  You may have a small piece of cast that can be removed to check on incisions under your cast  Make sure the small piece of cast is kept tightly closed  If your cast gets dirty, use a mild detergent and a damp washcloth to wipe off the outside of your cast  Continue to cover your cast with trash bags to keep it dry while you bathe  Care for the edges of your cast   Cover the cast edges to keep them smooth  Use 4 inch pieces of waterproof tape  Place one end of the tape under the inside edge of your cast and fold it over to the outside surface  Overlap tape strips until the edges are completely covered  Change the tape as directed  Do not pull or repair any of the padding from inside the cast  This could cause blisters and sores on the skin under your cast      Keep weight off your cast   Do not let anyone push down or lean on your cast  This may cause it to break  Do not use sharp objects  Do not use a sharp or pointed object to scratch under your cast  This may cause wounds that can get infected, or you may lose the item inside the cast  If your skin itches, blow cool air under the cast  You may also gently scratch your skin outside the cast with a cloth  © Copyright [employer] 2022 Information is for End User's use only and may not be sold, redistributed or otherwise used for commercial purposes  All illustrations and images included in CareNotes® are the copyrighted property of A D A M , Inc  or Lolly Poole   The above information is an  only  It is not intended as medical advice for individual conditions or treatments   Talk to your doctor, nurse or pharmacist before following any medical regimen to see if it is safe and effective for you

## 2022-09-16 DIAGNOSIS — F31.81 BIPOLAR 2 DISORDER (HCC): Chronic | ICD-10-CM

## 2022-09-16 RX ORDER — CLONAZEPAM 0.5 MG/1
0.5 TABLET ORAL 3 TIMES DAILY
Qty: 270 TABLET | Refills: 0 | Status: SHIPPED | OUTPATIENT
Start: 2022-09-16 | End: 2022-10-21 | Stop reason: SDUPTHER

## 2022-09-19 ENCOUNTER — TELEPHONE (OUTPATIENT)
Dept: OBGYN CLINIC | Facility: MEDICAL CENTER | Age: 62
End: 2022-09-19

## 2022-09-19 NOTE — TELEPHONE ENCOUNTER
Spoke with patient and appt provided with [facility] for NI not assessed by Dr Karime Solomon  9/22 appt provided

## 2022-09-19 NOTE — TELEPHONE ENCOUNTER
Patient sees Dr Melchor Hair  Patient is calling about her rt elbow she is experiencing a lot of pain of an 8  Patient is asking what she can do to help with the pain, she cannot  anything with her right arm  Asking for a call back relating this       # [phone]

## 2022-09-22 ENCOUNTER — OFFICE VISIT (OUTPATIENT)
Dept: OBGYN CLINIC | Facility: CLINIC | Age: 62
End: 2022-09-22
Payer: MEDICARE

## 2022-09-22 ENCOUNTER — APPOINTMENT (OUTPATIENT)
Dept: RADIOLOGY | Facility: CLINIC | Age: 62
End: 2022-09-22
Payer: MEDICARE

## 2022-09-22 VITALS — BODY MASS INDEX: 34.69 KG/M2 | WEIGHT: 221 LBS | HEIGHT: 67 IN

## 2022-09-22 DIAGNOSIS — M25.521 PAIN IN RIGHT ELBOW: Primary | ICD-10-CM

## 2022-09-22 DIAGNOSIS — M77.11 LATERAL EPICONDYLITIS OF RIGHT ELBOW: ICD-10-CM

## 2022-09-22 DIAGNOSIS — M25.521 PAIN IN RIGHT ELBOW: ICD-10-CM

## 2022-09-22 PROCEDURE — 73080 X-RAY EXAM OF ELBOW: CPT

## 2022-09-22 PROCEDURE — 99213 OFFICE O/P EST LOW 20 MIN: CPT | Performed by: PHYSICIAN ASSISTANT

## 2022-09-22 NOTE — PATIENT INSTRUCTIONS
Tennis Elbow Exercises   AMBULATORY CARE:   Tennis elbow exercises  help decrease pain in your elbow, forearm, wrist, and hand  They also help strengthen your arm muscles and prevent further injury  Start these exercises slowly  Do the exercises on both arms  Stop if you feel pain  Finger extensions:  Hold your fingers close together  Put a rubber band around the outside of your fingers and thumb  Spread your fingers apart and then slowly bring them together without letting the rubber band fall off  Repeat 40 times  Wrist flexor stretch:  Hold your arm straight out in front of you with your palm facing down  Use your other hand to grasp your fingers  Keep your elbow straight and slowly bend your hand back  Your fingertips should point up and your palm should face away from you  Do this until you feel a stretch in the top of your wrist  Hold for 10 seconds  Repeat 5 times  Wrist extensor stretch: This stretch is the opposite of the wrist flexor stretch  Hold your arm straight out in front of you with your palm facing down  Use your other hand to grasp your fingers  Keep your elbow straight and slowly bend your hand down  Your fingertips should point down and your palm should face you  Do this until you feel a stretch in your wrist  Hold for 10 seconds  Repeat 5 times  Bicep curls:  Place your hand under your injured elbow for support  Turn your palm so that it faces up and hold a weight in your hand  Ask your healthcare provider how much weight you should use  Slowly bend and straighten your elbow 30 times  :  Hold a soft rubber ball or tennis ball in your hand  Squeeze the ball as hard as you can and hold this position  Ask your healthcare provider how long to hold this position  Repeat this exercise as directed by your healthcare provider  Contact your healthcare provider if:   You have increased pain or weakness in your arm, wrist, hand, or fingers      You have new numbness or tingling in your arm, hand, or fingers  You have questions or concerns about tennis elbow exercises  © Copyright [employer] 2022 Information is for End User's use only and may not be sold, redistributed or otherwise used for commercial purposes  All illustrations and images included in CareNotes® are the copyrighted property of A D A M , Inc  or Lolly Pozo  The above information is an  only  It is not intended as medical advice for individual conditions or treatments  Talk to your doctor, nurse or pharmacist before following any medical regimen to see if it is safe and effective for you  Tennis Elbow   WHAT YOU NEED TO KNOW:   What is tennis elbow? Tennis elbow is inflammation of the tendons in your elbow  Tendons are strong tissues that connect muscle to bone  What causes tennis elbow? Tennis elbow is caused by overuse of the muscles in your forearm  These muscles are used to straighten your arm or lift your hand and wrist  Fast, repeated arm movements can lead to inflammation and small tears in your tendon  Tennis, painting, and manual labor are common activities that can cause tennis elbow  What are the signs and symptoms of tennis elbow? Pain on the side of your elbow that travels to your upper arm, forearm, or fingers    Weakness in your wrist or hand    Trouble holding, lifting, or grabbing an object, such as a coffee cup    Red, swollen, or warm skin on the outside of your elbow    How is tennis elbow diagnosed? Your healthcare provider will feel around your elbow to check for painful areas  He will check the movement of your elbow, wrist, and fingers  An x-ray, ultrasound, or MRI may show tendon damage  You may be given contrast liquid to help the tendons show up better in the pictures  Tell the healthcare provider if you have ever had an allergic reaction to contrast liquid  Do not enter the MRI room with anything metal  Metal can cause serious injury   Tell the healthcare provider if you have any metal in or on your body  How is tennis elbow treated? Support devices  may be needed to limit your arm movement  Examples include an arm strap, brace, or splint  These devices also help decrease pain and prevent more damage to your tendon  Acetaminophen  decreases pain and fever  It is available without a doctor's order  Ask how much to take and how often to take it  Follow directions  Read the labels of all other medicines you are using to see if they also contain acetaminophen, or ask your doctor or pharmacist  Acetaminophen can cause liver damage if not taken correctly  Do not use more than 4 grams (4,000 milligrams) total of acetaminophen in one day  NSAIDs , such as ibuprofen, help decrease swelling, pain, and fever  This medicine is available with or without a doctor's order  NSAIDs can cause stomach bleeding or kidney problems in certain people  If you take blood thinner medicine, always ask your healthcare provider if NSAIDs are safe for you  Always read the medicine label and follow directions  A steroid injection  will help decrease pain and swelling  Physical therapy  may be recommended  A physical therapist teaches you exercises to help improve movement and strength, and to decrease pain  Surgery  may be needed if your symptoms do not improve with other treatments  During surgery, your healthcare provider will remove any damaged tissue  He may also cut your tendon and reattach it  How can I manage my symptoms? Rest  your injured arm and avoid activities that cause pain  This will help your tendons heal     Apply ice  on your elbow for 15 to 20 minutes every hour or as directed  Use an ice pack, or put crushed ice in a plastic bag  Cover it with a towel before you apply it to your skin  Ice helps prevent tissue damage and decreases swelling and pain  Elevate  your elbow above the level of your heart as often as you can   This will help decrease swelling and pain  Prop your elbow on pillows or blankets to keep it elevated comfortably  When should I seek immediate care? You suddenly have no feeling in your arm, hand, or fingers  You suddenly cannot move your arm, wrist, hand, or fingers  When should I contact my healthcare provider? Your symptoms do not get better within 2 weeks, even with treatment  You have more pain or weakness in your arm, wrist, hand, or fingers  You have new numbness or tingling in your arm, hand, or fingers  You have questions or concerns about your condition or care  CARE AGREEMENT:   You have the right to help plan your care  Learn about your health condition and how it may be treated  Discuss treatment options with your healthcare providers to decide what care you want to receive  You always have the right to refuse treatment  The above information is an  only  It is not intended as medical advice for individual conditions or treatments  Talk to your doctor, nurse or pharmacist before following any medical regimen to see if it is safe and effective for you  © Copyright [employer] 2022 Information is for End User's use only and may not be sold, redistributed or otherwise used for commercial purposes   All illustrations and images included in CareNotes® are the copyrighted property of [employer]  or [address] [place] [address]

## 2022-09-22 NOTE — PROGRESS NOTES
Orthopaedic Surgery - Office Note  Devin Limon (6 y o  female)   : 1960   MRN: [id number]  Encounter Date: 2022    No chief complaint on file  Chief complaint is right elbow pain      Assessment/Plan  Diagnoses and all orders for this visit:    Pain in right elbow  -     XR elbow 3+ vw right; Future  -     Durable Medical Equipment    Lateral epicondylitis of right elbow  -     Durable Medical Equipment    The diagnosis as well as treatment options were reviewed with the patient in the office today  I would not recommend a cortisone injection at this time as she is healing a fracture and postoperative recovery  Limited benefit would be expected from occupational therapy while she is wearing the short arm cast   She cannot tolerate oral NSAIDs due to chronic kidney disease  Would recommend a regular icing program and giving her a home exercise program for her to work on for tolerable exercises  She will be provided a lateral epicondylitis strap which may be worn when active  This was demonstrated to the patient in the office today and there was enough room for benefit above the short-arm cast     She was advised once the cast come off her symptoms may begin to improve and at that time we could consider possible occupational therapy and/or cortisone injection if indicated  I would not recommend any oral narcotics for this condition  She may use Tylenol as needed for pain  Return for Recheck on 10/3/22 with Dr Karime Solomon  History of Present Illness  This is a previous patient here for new problem  Patient is having right elbow pain rated 8/10  No trauma to the elbow is reported  She localizes the pain to the lateral epicondyle and reports it is worse when she tries to grab something with her hand  She is wearing the short arm cast which she denies any problems with  No hand paresthesias are reported    She states she cannot take oral NSAIDs due to kidney disease    Patient has a history of S/P Removal of hardware volar aspect right distal radius (distal radial plate and screws) - Right, Right ring finger flexor digitorum superficialis to flexor pollicis longus tendon transfer - Right, and Application short-arm thumb spica splint - Right on 6/23/2022 with a new ulnar styloid fracture on 08/10/2022  The plan was for patient to be in 6 weeks of a short-arm cast   She has a follow-up scheduled with the hand surgeon on 10/03/2022  Review of Systems  Pertinent items are noted in HPI  All other systems were reviewed and are negative  Physical Exam  Ht 5' 7" (1 702 m)   Wt 100 kg (221 lb)   LMP  (LMP Unknown)   BMI 34 61 kg/m²   Cons: Appears well  No apparent distress  Psych: Alert  Oriented x3  Mood and affect normal   Eyes: PERRLA, EOMI  Resp: Normal effort  No audible wheezing or stridor  CV: Palpable pulse  No discernable arrhythmia  Lymph:  No palpable cervical, axillary, or inguinal lymphadenopathy  Skin: Warm  No palpable masses  No visible lesions  Neuro: Normal muscle tone  Normal and symmetric DTR's  Patient's right elbow has no skin breakdown lesion or signs of infection  There is no olecranon bursitis  She is tender to palpation at the lateral epicondyle  She is nontender to palpation at the medial epicondyle  She has full right elbow range of motion  With her fingers fully extended from the cast she has pain against resistance to extension localized to the lateral epicondyle  She is neurovascularly intact in the right distal digits and the cast is fitting appropriately  Studies Reviewed  X-rays performed in the office today three views of the right elbow show no acute fractures dislocations or significant degenerative changes  This was read from an orthopedic standpoint will await official radiologist interpretation    Procedures  No procedures today      Medical, Surgical, Family, and Social History  The patient's medical history, family history, and social history, were reviewed and updated as appropriate      Past Medical History:   Diagnosis Date    Anxiety     Anxiety disorder     At risk for falls     Bipolar 2 disorder (HCC)     Chronic back pain     Chronic kidney disease     Closed fracture of distal end of right fibula with routine healing 2020    COVID-19     in 2021    CVA [facility] accident[facility])     noted on MRI in the past    Depression     GERD (gastroesophageal reflux disease)     Hypercholesteremia     Hypernatremia     Hypertension     Hypokalemia     Idiopathic chronic pancreatitis ([facility] [address] ) 2018    Intervertebral disc disorder with radiculopathy of lumbosacral region     resolved: 2015    Kidney disease     Limb alert care status     LUE-fistula    Panic attacks     Pericardial effusion     PONV (postoperative nausea and vomiting)     Radiculitis     resolved: 2015    Secondary renal hyperparathyroidism ([facility] [address] )     Vitamin D deficiency        Past Surgical History:   Procedure Laterality Date    BUNIONECTOMY      Left foot     CAST APPLICATION Right 6076    Procedure: Application short-arm thumb spica splint;  Surgeon: Anish Mejia MD;  [facility];  Service: Orthopedics    COLON SURGERY      COLONOSCOPY  2021    DILATION AND CURETTAGE OF UTERUS      INDUCED       surgically induced    AZ ANASTOMOSIS,AV,ANY SITE Left 2019    Procedure: CREATION FISTULA ARTERIOVENOUS (AV) left wrists possible left upper;  Surgeon: Oscar Knox MD;  [facility];  Service: Vascular    AZ CORRJ HALLUX VALGUS W/SESMDC W/DIST [facility] Left 2019    Procedure: Bel Reid;  Surgeon: Adele Solomon DPM;  [facility];  Service: Podiatry    AZ Yvonneshjudith W/SESMDC W/DIST [facility] Right 8/3/2020    Procedure: Rigo Mann;  Surgeon: Adele Solomon DPM;  [facility];  Service: Podiatry    AZ Yvonneshire W/SESMDC W/PROX PHLNX OSTEOT Right 9/27/2021    Procedure: Alric Ann, right tameka osteotomy and 2nd claw toe correction;  Surgeon: Ebenezer Gaspar MD;  [facility];  Service: Orthopedics    CA ERCP DX COLLECTION SPECIMEN BRUSHING/WASHING N/A 4/11/2018    Procedure: ENDOSCOPIC RETROGRADE CHOLANGIOPANCREATOGRAPHY (ERCP); Surgeon: Juan Miguel Jeff MD;  [facility];  Service: Gastroenterology    CA FINGER TENDON UAITZSRA,9-6 FINGRS Right 6/23/2022    Procedure: Right ring finger flexor digitorum superficialis to flexor pollicis longus tendon transfer;  Surgeon: Gracie Levy MD;  [facility];  Service: Orthopedics    CA LAP, SURG PROCTOPEXY N/A 7/13/2018    Procedure: ROBOTIC SIGMOID RESECTION / RECTOPEXY;  Surgeon: Scooter Richards MD;  [facility];  Service: Colorectal    CA OPEN TREATMENT RADIAL SHAFT FRACTURE Right 10/11/2021    Procedure: OPEN REDUCTION W/ INTERNAL FIXATION (ORIF) RADIUS (WRIST), RIGHT DISTAL;  Surgeon: Chrystal Spatz, MD;  [facility];  Service: Orthopedics    CA REMOVAL DEEP IMPLANT Right 6/23/2022    Procedure: Removal of hardware volar aspect right distal radius (distal radial plate and screws);   Surgeon: Gracie Levy MD;  [facility];  Service: Orthopedics    CA SIGMOIDOSCOPY FLX DX W/COLLJ SPEC BR/WA IF PFRMD N/A 7/13/2018    Procedure: Kaelyn Bone;  Surgeon: Scooter Richards MD;  [facility];  Service: Colorectal    TUBAL LIGATION Bilateral 1997   [facility] THYROID BIOPSY  7/30/2019       Family History   Problem Relation Age of Onset    Bipolar disorder Mother     Mental illness Mother         depression    Stroke Mother     Dementia Mother     Colon polyps Mother     Heart disease Father     Hypertension Father     Diabetes Father     Other Family         Back disorder    Diabetes Family     Heart disease Family     Hypertension Family     Stroke Family  Thyroid disease Family     Breast cancer Paternal Grandmother         age unknown   Chantelle Leisure Breast cancer Paternal [de-identified]         age unknown   Chantelle Leisure Breast cancer Maternal [de-identified]         age unknown    Mental illness Sister     Colon polyps Sister     Mental illness Sister     Heart disease Sister     No Known Problems Sister     Breast cancer Sister 76    Other Son         pituitary tumor    Hypertension Son     Obesity Son     No Known Problems Son     No Known Problems Maternal Grandmother     No Known Problems Maternal Grandfather     No Known Problems Paternal Grandfather     Breast cancer Paternal Aunt         age unknown    Substance Abuse Neg Hx         neg fam hx    Colon cancer Neg Hx        Social History     Occupational History    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   Tobacco Use    Smoking status: Never Smoker    Smokeless tobacco: Never Used   Vaping Use    Vaping Use: Never used   Substance and Sexual Activity    Alcohol use: Never    Drug use: Not Currently    Sexual activity: Not Currently       Allergies   Allergen Reactions    Pollen Extract Nasal Congestion         Current Outpatient Medications:     acetaminophen (TYLENOL) 650 mg CR tablet, Take 1 tablet (650 mg total) by mouth every 8 (eight) hours as needed for mild pain, Disp: 30 tablet, Rfl: 0    albuterol (Ventolin HFA) 90 mcg/act inhaler, Inhale 2 puffs every 6 (six) hours as needed for wheezing or shortness of breath, Disp: 8 5 g, Rfl: 3    AMILoride 5 mg tablet, Take 1 tablet (5 mg total) by mouth 2 (two) times a day, Disp: 180 tablet, Rfl: 3    aspirin 81 mg chewable tablet, Chew 1 tablet (81 mg total) daily, Disp: , Rfl:     carvedilol (COREG) 25 mg tablet, Take 1 tablet (25 mg total) by mouth 2 (two) times a day with meals, Disp: 180 tablet, Rfl: 3    cholecalciferol (VITAMIN D3) 1,000 units tablet, Take 5 tablets (5,000 Units total) by mouth daily, Disp: 90 tablet, Rfl: 5    clonazePAM (KlonoPIN) 0 5 mg tablet, Take 1 tablet (0 5 mg total) by mouth 3 (three) times a day, Disp: 270 tablet, Rfl: 0    fluvoxaMINE (LUVOX) 100 mg tablet, 2 (two) times a day 1 tab am , 2 tabs HS, Disp: , Rfl:     lamoTRIgine (LaMICtal) 200 MG tablet, Take 1 tablet (200 mg total) by mouth 2 (two) times a day, Disp: 60 tablet, Rfl: 1    linaCLOtide (Linzess) 290 MCG CAPS, Take 1 capsule by mouth daily, Disp: 90 capsule, Rfl: 0    naloxone (NARCAN) 4 mg/0 1 mL nasal spray, Administer 1 spray into a nostril   If no response after 2-3 minutes, give another dose in the other nostril using a new spray , Disp: 1 each, Rfl: 1    omeprazole (PriLOSEC) 40 MG capsule, Take 1 capsule (40 mg total) by mouth daily before breakfast, Disp: 90 capsule, Rfl: 3    ondansetron (ZOFRAN) 4 mg tablet, Take 1 tablet (4 mg total) by mouth every 8 (eight) hours as needed for nausea or vomiting, Disp: 60 tablet, Rfl: 1    Polyethylene Glycol 3350 (MIRALAX PO), Take by mouth as needed , Disp: , Rfl:     QUEtiapine (SEROquel) 100 mg tablet, Take 1 tablet by mouth daily at bedtime 100mg with 400mg bedtime , Disp: , Rfl:     QUEtiapine (SEROquel) 300 mg tablet, Take 300 mg by mouth in the morning 300mg in morning , Disp: , Rfl:     QUEtiapine (SEROquel) 400 MG tablet, Take 400 mg by mouth daily at bedtime  , Disp: , Rfl:     rosuvastatin (CRESTOR) 20 MG tablet, Take 1 tablet (20 mg total) by mouth every evening, Disp: 90 tablet, Rfl: 3    traMADol (Ultram) 50 mg tablet, Take 1 tablet (50 mg total) by mouth every 8 (eight) hours as needed for moderate pain, Disp: 30 tablet, Rfl: 0      Jung Jonas PA-C

## 2022-09-23 ENCOUNTER — TELEPHONE (OUTPATIENT)
Dept: FAMILY MEDICINE CLINIC | Facility: HOSPITAL | Age: 62
End: 2022-09-23

## 2022-09-23 NOTE — TELEPHONE ENCOUNTER
Patient said that optum needs to talk to someone about her Clonazepam script  Please call [phone]

## 2022-09-26 ENCOUNTER — OFFICE VISIT (OUTPATIENT)
Dept: OBGYN CLINIC | Facility: CLINIC | Age: 62
End: 2022-09-26

## 2022-09-26 ENCOUNTER — APPOINTMENT (OUTPATIENT)
Dept: RADIOLOGY | Facility: CLINIC | Age: 62
End: 2022-09-26
Payer: MEDICARE

## 2022-09-26 ENCOUNTER — TELEPHONE (OUTPATIENT)
Dept: NEPHROLOGY | Facility: CLINIC | Age: 62
End: 2022-09-26

## 2022-09-26 VITALS — HEIGHT: 67 IN | BODY MASS INDEX: 34.69 KG/M2 | WEIGHT: 221 LBS

## 2022-09-26 DIAGNOSIS — Z47.89 AFTERCARE FOLLOWING SURGERY OF THE MUSCULOSKELETAL SYSTEM: ICD-10-CM

## 2022-09-26 DIAGNOSIS — Z47.89 AFTERCARE FOLLOWING SURGERY OF THE MUSCULOSKELETAL SYSTEM: Primary | ICD-10-CM

## 2022-09-26 PROCEDURE — 73110 X-RAY EXAM OF WRIST: CPT

## 2022-09-26 PROCEDURE — 99024 POSTOP FOLLOW-UP VISIT: CPT | Performed by: PHYSICIAN ASSISTANT

## 2022-09-26 RX ORDER — SULFAMETHOXAZOLE AND TRIMETHOPRIM 800; 160 MG/1; MG/1
1 TABLET ORAL EVERY 12 HOURS SCHEDULED
Qty: 20 TABLET | Refills: 0 | Status: SHIPPED | OUTPATIENT
Start: 2022-09-26 | End: 2022-10-06

## 2022-09-26 NOTE — TELEPHONE ENCOUNTER
Lm for pt offering sooner appt with Dr Wenceslao Ariza 9/27 at 11:00 in [facility]

## 2022-09-26 NOTE — PROGRESS NOTES
Assessment:   S/P Removal of hardware volar aspect right distal radius (distal radial plate and screws) - Right, Right ring finger flexor digitorum superficialis to flexor pollicis longus tendon transfer - Right, and Application short-arm thumb spica splint - Right on 6/23/2022 with associated ulnar styloid fracture after fall on 08/10/2022    Wound in the 1st web space by the index finger MCP joint    Plan:   Patient was given Bactrim as an antibiotic   She was advised to perform 3 times daily Betadine painting so over the wound  She was advised to avoid any soaking   She will be re-evaluated on Thursday by Donna Roberson PA-C to ensure that the wound is healing appropriately and then follow-up on next Monday with Dr Moise Pyle     Follow Up:  Thursday    To Do Next Visit  Wound evaluation    CHIEF COMPLAINT:  No chief complaint on file  SUBJECTIVE:  Yao Mccormick is a 63 y o  female who presents for follow up after Removal of hardware volar aspect right distal radius (distal radial plate and screws) - Right, Right ring finger flexor digitorum superficialis to flexor pollicis longus tendon transfer - Right, and Application short-arm thumb spica splint - Right on 6/23/2022  Today patient has pain into her wrist as well as the 1st web space of her hand  She states that the cast started to rub her skin rongeur over the index finger MCP joint  She states that the rubbing started to occur about 2 weeks ago  She said that she was going to make an appointment for last week but did not do so  She states that there is some tenderness around the open wound  She states she has been applying Neosporin to the wound        PHYSICAL EXAMINATION:  Vital signs: Ht 5' 7" (1 702 m)   Wt 100 kg (221 lb)   LMP  (LMP Unknown)   BMI 34 61 kg/m²   General: well developed and well nourished, alert, oriented times 3 and appears comfortable  Psychiatric: Normal    MUSCULOSKELETAL EXAMINATION:  Incision: clean, dry and 1 cm circular open wound that the index finger MCP joint with associated redness into the 1st webspace  Range of Motion: As expected and Limited due to stiffness  Neurovascular status: Neuro intact, good cap refill  Activity Restrictions: Cast/splint restrictions         STUDIES REVIEWED:  X-rays were reviewed of the right wrist which demonstrate a right ulnar styloid fracture, nondisplaced, previous hardware removed from the distal radius      PROCEDURES PERFORMED:  Procedures  No Procedures performed today

## 2022-09-28 PROBLEM — F31.4 SEVERE DEPRESSED BIPOLAR I DISORDER WITHOUT PSYCHOTIC FEATURES (HCC): Status: ACTIVE | Noted: 2022-09-28

## 2022-09-28 PROBLEM — F50.9 EATING DISORDER, UNSPECIFIED: Status: ACTIVE | Noted: 2022-09-28

## 2022-09-28 PROBLEM — F42.2 MIXED OBSESSIONAL THOUGHTS AND ACTS: Status: ACTIVE | Noted: 2022-09-28

## 2022-09-29 DIAGNOSIS — M79.601 PAIN OF RIGHT UPPER EXTREMITY: ICD-10-CM

## 2022-09-29 RX ORDER — TRAMADOL HYDROCHLORIDE 50 MG/1
50 TABLET ORAL EVERY 8 HOURS PRN
Qty: 30 TABLET | Refills: 0 | Status: SHIPPED | OUTPATIENT
Start: 2022-09-29 | End: 2022-10-10 | Stop reason: SDUPTHER

## 2022-10-03 ENCOUNTER — OFFICE VISIT (OUTPATIENT)
Dept: OBGYN CLINIC | Facility: CLINIC | Age: 62
End: 2022-10-03

## 2022-10-03 ENCOUNTER — TELEPHONE (OUTPATIENT)
Dept: BEHAVIORAL/MENTAL HEALTH CLINIC | Facility: CLINIC | Age: 62
End: 2022-10-03

## 2022-10-03 VITALS
HEIGHT: 67 IN | SYSTOLIC BLOOD PRESSURE: 142 MMHG | WEIGHT: 221 LBS | DIASTOLIC BLOOD PRESSURE: 81 MMHG | BODY MASS INDEX: 34.69 KG/M2

## 2022-10-03 DIAGNOSIS — Z47.89 AFTERCARE FOLLOWING SURGERY OF THE MUSCULOSKELETAL SYSTEM: Primary | ICD-10-CM

## 2022-10-03 DIAGNOSIS — S52.531D CLOSED COLLES' FRACTURE OF RIGHT RADIUS WITH ROUTINE HEALING, SUBSEQUENT ENCOUNTER: ICD-10-CM

## 2022-10-03 PROCEDURE — 99024 POSTOP FOLLOW-UP VISIT: CPT | Performed by: ORTHOPAEDIC SURGERY

## 2022-10-03 NOTE — PROGRESS NOTES
Assessment:   Aprox  3 months S/P Removal of hardware volar aspect right distal radius (distal radial plate and screws) - Right, Right ring finger flexor digitorum superficialis to flexor pollicis longus tendon transfer - Right, and Application short-arm thumb spica splint - Right on 6/23/2022   Aprox  2 months s/p associated ulnar styloid fracture after fall on 08/10/2022    Plan:   May d/c the use of Betadine paints, may use a small amount of neosporin over the wound until it finishes healing   Ulnar sided wrist pain can last 1 year from injury   OT script was provided for the ulnar styloid fracture and tendon transfer  Activities to tolerance, no restrictions     Follow Up:  6  week(s)    To Do Next Visit:  Re-evaluation       CHIEF COMPLAINT:  Chief Complaint   Patient presents with    Right Wrist - Post-op, Wound Check         SUBJECTIVE:  Gisselle Templeton is a 63 y o  female who presents for follow up after Removal of hardware volar aspect right distal radius (distal radial plate and screws) - Right, Right ring finger flexor digitorum superficialis to flexor pollicis longus tendon transfer - Right, and Application short-arm thumb spica splint - Right on 6/23/2022  Today patient has some soreness to her wrist  She has been performing Betadine paints due to a wound near her index finger, which she notes has healed up well  She is taking 600 Mg of Tylenol for pain control         PHYSICAL EXAMINATION:  Vital signs: /81   Ht 5' 7" (1 702 m)   Wt 100 kg (221 lb)   LMP  (LMP Unknown)   BMI 34 61 kg/m²   General: well developed and well nourished, alert, oriented times 3 and appears comfortable  Psychiatric: Normal    MUSCULOSKELETAL EXAMINATION:  Incision: healed  Range of Motion: As expected and full composite fist possible  Neurovascular status: Neuro intact, good cap refill  Activity Restrictions: No restrictions  No volar crepitus   Index finger wound is well healed     STUDIES REVIEWED:  No Studies to review      PROCEDURES PERFORMED:  Procedures  No Procedures performed today    Scribe Attestation    I,:  Sanna Reyes am acting as a scribe while in the presence of the attending physician :       I,:  Anish Mejia MD personally performed the services described in this documentation    as scribed in my presence :

## 2022-10-04 ENCOUNTER — TELEPHONE (OUTPATIENT)
Dept: BEHAVIORAL/MENTAL HEALTH CLINIC | Facility: CLINIC | Age: 62
End: 2022-10-04

## 2022-10-05 ENCOUNTER — APPOINTMENT (OUTPATIENT)
Dept: LAB | Facility: HOSPITAL | Age: 62
End: 2022-10-05
Attending: INTERNAL MEDICINE
Payer: MEDICARE

## 2022-10-05 DIAGNOSIS — N18.5 CKD (CHRONIC KIDNEY DISEASE) STAGE 5, GFR LESS THAN 15 ML/MIN (HCC): ICD-10-CM

## 2022-10-05 LAB
ALBUMIN SERPL BCP-MCNC: 3.4 G/DL (ref 3.5–5)
ALP SERPL-CCNC: 185 U/L (ref 46–116)
ALT SERPL W P-5'-P-CCNC: 34 U/L (ref 12–78)
ANION GAP SERPL CALCULATED.3IONS-SCNC: 6 MMOL/L (ref 4–13)
AST SERPL W P-5'-P-CCNC: 27 U/L (ref 5–45)
BACTERIA UR QL AUTO: ABNORMAL /HPF
BILIRUB SERPL-MCNC: 0.27 MG/DL (ref 0.2–1)
BILIRUB UR QL STRIP: NEGATIVE
BUN SERPL-MCNC: 56 MG/DL (ref 5–25)
CALCIUM ALBUM COR SERPL-MCNC: 10.2 MG/DL (ref 8.3–10.1)
CALCIUM SERPL-MCNC: 9.7 MG/DL (ref 8.3–10.1)
CHLORIDE SERPL-SCNC: 108 MMOL/L (ref 96–108)
CLARITY UR: CLEAR
CO2 SERPL-SCNC: 25 MMOL/L (ref 21–32)
COLOR UR: COLORLESS
CREAT SERPL-MCNC: 2.61 MG/DL (ref 0.6–1.3)
CREAT UR-MCNC: 27.1 MG/DL
ERYTHROCYTE [DISTWIDTH] IN BLOOD BY AUTOMATED COUNT: 13.2 % (ref 11.6–15.1)
GFR SERPL CREATININE-BSD FRML MDRD: 19 ML/MIN/1.73SQ M
GLUCOSE P FAST SERPL-MCNC: 113 MG/DL (ref 65–99)
GLUCOSE UR STRIP-MCNC: NEGATIVE MG/DL
HCT VFR BLD AUTO: 34.9 % (ref 34.8–46.1)
HGB BLD-MCNC: 10.7 G/DL (ref 11.5–15.4)
HGB UR QL STRIP.AUTO: NEGATIVE
KETONES UR STRIP-MCNC: NEGATIVE MG/DL
LEUKOCYTE ESTERASE UR QL STRIP: ABNORMAL
MAGNESIUM SERPL-MCNC: 2.6 MG/DL (ref 1.6–2.6)
MCH RBC QN AUTO: 31.8 PG (ref 26.8–34.3)
MCHC RBC AUTO-ENTMCNC: 30.7 G/DL (ref 31.4–37.4)
MCV RBC AUTO: 104 FL (ref 82–98)
NITRITE UR QL STRIP: NEGATIVE
NON-SQ EPI CELLS URNS QL MICRO: ABNORMAL /HPF
PH UR STRIP.AUTO: 6 [PH]
PHOSPHATE SERPL-MCNC: 4.3 MG/DL (ref 2.3–4.1)
PLATELET # BLD AUTO: 232 THOUSANDS/UL (ref 149–390)
PMV BLD AUTO: 10.4 FL (ref 8.9–12.7)
POTASSIUM SERPL-SCNC: 5 MMOL/L (ref 3.5–5.3)
PROT SERPL-MCNC: 6.8 G/DL (ref 6.4–8.4)
PROT UR STRIP-MCNC: NEGATIVE MG/DL
PROT UR-MCNC: 12 MG/DL
PROT/CREAT UR: 0.44 MG/G{CREAT} (ref 0–0.1)
PTH-INTACT SERPL-MCNC: 87.6 PG/ML (ref 18.4–80.1)
RBC # BLD AUTO: 3.37 MILLION/UL (ref 3.81–5.12)
RBC #/AREA URNS AUTO: ABNORMAL /HPF
SODIUM SERPL-SCNC: 139 MMOL/L (ref 135–147)
SP GR UR STRIP.AUTO: 1.01 (ref 1–1.03)
URATE SERPL-MCNC: 4.5 MG/DL (ref 2–7.5)
UROBILINOGEN UR STRIP-ACNC: <2 MG/DL
WBC # BLD AUTO: 5.79 THOUSAND/UL (ref 4.31–10.16)
WBC #/AREA URNS AUTO: ABNORMAL /HPF

## 2022-10-05 PROCEDURE — 84100 ASSAY OF PHOSPHORUS: CPT

## 2022-10-05 PROCEDURE — 82570 ASSAY OF URINE CREATININE: CPT

## 2022-10-05 PROCEDURE — 36415 COLL VENOUS BLD VENIPUNCTURE: CPT

## 2022-10-05 PROCEDURE — 81001 URINALYSIS AUTO W/SCOPE: CPT

## 2022-10-05 PROCEDURE — 83970 ASSAY OF PARATHORMONE: CPT

## 2022-10-05 PROCEDURE — 84156 ASSAY OF PROTEIN URINE: CPT

## 2022-10-05 PROCEDURE — 80053 COMPREHEN METABOLIC PANEL: CPT

## 2022-10-05 PROCEDURE — 85027 COMPLETE CBC AUTOMATED: CPT

## 2022-10-05 PROCEDURE — 83735 ASSAY OF MAGNESIUM: CPT

## 2022-10-05 PROCEDURE — 84550 ASSAY OF BLOOD/URIC ACID: CPT

## 2022-10-07 ENCOUNTER — TELEPHONE (OUTPATIENT)
Dept: FAMILY MEDICINE CLINIC | Facility: HOSPITAL | Age: 62
End: 2022-10-07

## 2022-10-07 NOTE — TELEPHONE ENCOUNTER
Patient apologizes for missing her appointment this morning  She thought it was later in the day  She rescheduled for 12/7/22  Can someone call her regarding her recent test results?

## 2022-10-07 NOTE — TELEPHONE ENCOUNTER
Labs show elevated glucose at 113-continue to follow a limited carbohydrate diet and plenty of healthy bed chewables and fruits with lean protein  Patient has elevated PTH levels likely in the setting of her chronic kidney disease-she does have a nephrology appointment 11 1 in the system and she should be sure to follow with them creatinine is slightly improved-have patient stay well hydrated

## 2022-10-10 ENCOUNTER — OFFICE VISIT (OUTPATIENT)
Dept: FAMILY MEDICINE CLINIC | Facility: HOSPITAL | Age: 62
End: 2022-10-10
Payer: MEDICARE

## 2022-10-10 ENCOUNTER — EVALUATION (OUTPATIENT)
Dept: OCCUPATIONAL THERAPY | Facility: CLINIC | Age: 62
End: 2022-10-10
Payer: MEDICARE

## 2022-10-10 VITALS
BODY MASS INDEX: 35.31 KG/M2 | HEIGHT: 67 IN | OXYGEN SATURATION: 97 % | HEART RATE: 83 BPM | SYSTOLIC BLOOD PRESSURE: 150 MMHG | DIASTOLIC BLOOD PRESSURE: 82 MMHG | WEIGHT: 225 LBS

## 2022-10-10 DIAGNOSIS — S52.531D CLOSED COLLES' FRACTURE OF RIGHT RADIUS WITH ROUTINE HEALING, SUBSEQUENT ENCOUNTER: ICD-10-CM

## 2022-10-10 DIAGNOSIS — E66.01 OBESITY, MORBID (HCC): ICD-10-CM

## 2022-10-10 DIAGNOSIS — Z23 NEED FOR INFLUENZA VACCINATION: Primary | ICD-10-CM

## 2022-10-10 DIAGNOSIS — M54.41 CHRONIC BILATERAL LOW BACK PAIN WITH BILATERAL SCIATICA: Chronic | ICD-10-CM

## 2022-10-10 DIAGNOSIS — Z47.89 AFTERCARE FOLLOWING SURGERY OF THE MUSCULOSKELETAL SYSTEM: ICD-10-CM

## 2022-10-10 DIAGNOSIS — M54.42 CHRONIC BILATERAL LOW BACK PAIN WITH BILATERAL SCIATICA: Chronic | ICD-10-CM

## 2022-10-10 DIAGNOSIS — R29.898 WEAKNESS OF BOTH LOWER EXTREMITIES: ICD-10-CM

## 2022-10-10 DIAGNOSIS — M79.601 PAIN OF RIGHT UPPER EXTREMITY: ICD-10-CM

## 2022-10-10 DIAGNOSIS — G89.29 CHRONIC BILATERAL LOW BACK PAIN WITH BILATERAL SCIATICA: Chronic | ICD-10-CM

## 2022-10-10 DIAGNOSIS — M79.604 LEG PAIN, BILATERAL: ICD-10-CM

## 2022-10-10 DIAGNOSIS — M79.605 LEG PAIN, BILATERAL: ICD-10-CM

## 2022-10-10 PROCEDURE — 90682 RIV4 VACC RECOMBINANT DNA IM: CPT

## 2022-10-10 PROCEDURE — 97165 OT EVAL LOW COMPLEX 30 MIN: CPT | Performed by: OCCUPATIONAL THERAPIST

## 2022-10-10 PROCEDURE — 99214 OFFICE O/P EST MOD 30 MIN: CPT | Performed by: STUDENT IN AN ORGANIZED HEALTH CARE EDUCATION/TRAINING PROGRAM

## 2022-10-10 PROCEDURE — 97110 THERAPEUTIC EXERCISES: CPT | Performed by: OCCUPATIONAL THERAPIST

## 2022-10-10 PROCEDURE — G0008 ADMIN INFLUENZA VIRUS VAC: HCPCS

## 2022-10-10 RX ORDER — TRAMADOL HYDROCHLORIDE 50 MG/1
50 TABLET ORAL EVERY 8 HOURS PRN
Qty: 60 TABLET | Refills: 0 | Status: SHIPPED | OUTPATIENT
Start: 2022-10-10

## 2022-10-10 NOTE — PROGRESS NOTES
[facility],     Assessment/Plan:      Diagnosis ICD-10-CM Associated Orders   1  Need for influenza vaccination  Z23 influenza vaccine, quadrivalent, recombinant, PF, 0 5 mL, for patients 18 yr+ (FLUBLOK)   2  Chronic bilateral low back pain with bilateral sciatica  M54 42 Ambulatory Referral to Physical Therapy    M54 41 XR spine lumbar 2 or 3 views injury    G89 29    3  Obesity, morbid ([address] )  E66 01    4  Pain of right upper extremity  M79 601 traMADol (Ultram) 50 mg tablet   5  Weakness of both lower extremities  R29 898 Ambulatory Referral to Physical Therapy     XR spine lumbar 2 or 3 views injury   6  Leg pain, bilateral  M79 604 Ambulatory Referral to Physical Therapy    M79 605 XR spine lumbar 2 or 3 views injury     • PT ordered for leg weakness, back pain, and poor balance/gait for her age  • Tramadol dosing the same, but more pills distributed this time as she has been calling in every 2 weeks  • Lumbar XR if not getting better  Return if symptoms worsen or fail to improve  • Patient may call or return to office with any questions or concerns  ______________________________________________________________________  Subjective:     Patient ID: Ki Liu is a 63 y o  female  HPI  Ki Liu  Chief Complaint   Patient presents with   • Knee Pain   • Back Pain     B/L legs hurting for a long time, feels unbearable  Needs to use a cart to hold on, feels like she has to hunch over  Or with dishes - worse over the past few months  Using tramadol for pain, takes the edge off, not too helpful  Some falls due to tripping  Using tylenol XS q8h  No motrin due to CKD 4 - eGFR 19  Will be looking into kidney transplant  Voltaren gel not helping  Now seeing OT for thumb  XR's L Knee 9/21 - Mild osteoarthritis with narrowing of the medial tibiofemoral joint and small osteophytes seen    There is mild lateral compartment joint space narrowing  XR R Knee 9/21 - Mild osteoarthritis with narrowing of the medial tibiofemoral joint and small osteophytes seen  CT lumbar 3/4/22 - Mild multilevel spondylosis in the thoracic spine  Disc space narrowing and vacuum disc phenomenon with uncovering of the disc at L5-S1  The following portions of the patient's history were reviewed and updated as appropriate: allergies, current medications, past medical history, and problem list     Review of Systems   Constitutional: Negative for chills and fever  Respiratory: Negative for cough and shortness of breath  Cardiovascular: Negative for chest pain and palpitations  Musculoskeletal: Positive for arthralgias, back pain, gait problem and myalgias  Neurological: Negative for syncope and numbness  Objective:      Vitals:    10/10/22 1629   BP: 150/82   Pulse: 83   SpO2: 97%      Physical Exam  Vitals reviewed  Constitutional:       General: She is not in acute distress  Appearance: She is well-developed  She is obese  She is not ill-appearing  Comments: Appears older than stated age   HENT:      Head: Normocephalic and atraumatic  Eyes:      General: No scleral icterus  Right eye: No discharge  Left eye: No discharge  Cardiovascular:      Rate and Rhythm: Normal rate  Pulses: Normal pulses  Pulmonary:      Effort: Pulmonary effort is normal  No respiratory distress  Musculoskeletal:         General: Tenderness (Bilateral low back, bilateral SI joints, left piriformis and greater trochanteric bursa tender to palpation, right piriformis and greater trochanteric bursa and nontender) present  Cervical back: Normal range of motion  Comments: Mild kyphosis  Right greater than left hamstring tightness, slump testing only brought on calf and hamstring pain bilaterally, no back pain or neuropathy  No sensory deficit in bilateral legs    No muscle testing weakness in bilateral lower extremities Skin:     General: Skin is warm  Neurological:      Mental Status: She is alert and oriented to person, place, and time  Gait: Gait abnormal (Antalgic, slightly flexed at the waist)  Psychiatric:         Mood and Affect: Mood normal          Behavior: Behavior normal          Thought Content: Thought content normal          Judgment: Judgment normal            Portions of the record may have been created with voice recognition software  Occasional wrong word or "sound alike" substitutions may have occurred due to the inherent limitations of voice recognition software  Please review the chart carefully and recognize, using context, where substitutions/typographical errors may have occurred

## 2022-10-10 NOTE — PROGRESS NOTES
OT Evaluation     Today's date: 10/10/2022  Patient name: Esteban Zepeda  : 1960  MRN: [id number]  Referring provider: Jennifer Silvestre MD  Dx:   Encounter Diagnosis     ICD-10-CM    1  Aftercare following surgery of the musculoskeletal system  Z47 89 Ambulatory Referral to PT/OT Hand Therapy   2  Closed Colles' fracture of right radius with routine healing, subsequent encounter  S51 090K Ambulatory Referral to PT/OT Hand Therapy                  Assessment  Assessment details: Pt  Returns to therapy s/p R ulna styloid fracture with immobilization due to fall  Pt   Has h/o of tendon transfer and YAMILETH of the R wrist known to this department  Pt  Presents today with R wrist stiffness, limited ROM and functional strength  Pt  To benefit from skilled OT to improve R hand function  Impairments: abnormal or restricted ROM, impaired physical strength, lacks appropriate home exercise program and pain with function  Functional limitations: Pt  has weakness with lifting things with dominant hand  Understanding of Dx/Px/POC: good   Prognosis: good    Goals  STG( 4 visits)  1 Compliant with HEP  2  R wrist ROM improved by 10 degrees for function  LTG( 8 visits or discharge)  1  R wrist ROM WNLS for ADLS  2  R  strength increased to 20lbs or greater for lifting  3  Improve FOTO score to predicted outcome or greater      Plan  Patient would benefit from: skilled occupational therapy  Planned modality interventions: cryotherapy and thermotherapy: hydrocollator packs  Planned therapy interventions: joint mobilization, manual therapy, home exercise program, graded exercise, functional ROM exercises, therapeutic activities, therapeutic exercise and strengthening  Frequency: 1x week  Duration in visits: 1  Duration in weeks: 6  Plan of Care beginning date: 10/10/2022  Plan of Care expiration date: 2022  Treatment plan discussed with: patient        Subjective Evaluation    History of Present Illness  Mechanism of injury: Aprox  3 months S/P Removal of hardware volar aspect right distal radius (distal radial plate and screws) - Right, Right ring finger flexor digitorum superficialis to flexor pollicis longus tendon transfer - Right, and Application short-arm thumb spica splint - Right on 2022   Aprox  2 months s/p associated ulnar styloid fracture after fall on 08/10/2022    Pt  Returns to therapy s/p cast removal and referred to hand therapy for evaluation and treatment of R wrist with no restrictions  Pain  At worst pain ratin  Quality: discomfort  Aggravating factors: lifting  Progression: improved    Social Support  Lives in: multiple-level home  Lives with: sister  Employment status: not working  Hand dominance: right    Treatments  Current treatment: occupational therapy  Patient Goals  Patient goals for therapy: decreased pain, increased motion, increased strength and independence with ADLs/IADLs          Objective     Neurological Testing     Sensation     Wrist/Hand     Right   Intact: light touch    Active Range of Motion     Right Wrist   Wrist flexion: 28 degrees   Wrist extension: 58 degrees   Radial deviation: 10 degrees   Ulnar deviation: 50 degrees     Right Thumb   Opposition: IP joint hyperextended    Pt  Able to bring IP to neutral     Additional Active Range of Motion Details  Full composite fist, opposes to 5th digit    Strength/Myotome Testing     Left Wrist/Hand      (2nd hand position)     Trial 1: 20    Thumb Strength  Key/Lateral Pinch     Trial 1: 10  Tip/Two-Point Pinch     Trial 1: 5    Right Wrist/Hand      (2nd hand position)     Trial 1: 5    Thumb Strength   Key/Lateral Pinch     Trial 1: 10  Tip/Two-Point Pinch     Trial 1: 10             Precautions: Fall risk      Manuals 10/10             IE 30'                                                   Neuro Re-Ed Ther Ex             Wrist PROM 2m            Thumb IP flexion blocking 2m            Wrist curls #2 3x10            P/s therabar Red 3x10            Ball lifts 2 2# 3x10                                                   Ther Activity             FM- peg pickup                          Gait Training                                       Modalities              R 10m

## 2022-10-11 ENCOUNTER — TELEPHONE (OUTPATIENT)
Dept: NEPHROLOGY | Facility: CLINIC | Age: 62
End: 2022-10-11

## 2022-10-11 DIAGNOSIS — M20.5X1 CLAW TOE, ACQUIRED, RIGHT: ICD-10-CM

## 2022-10-11 DIAGNOSIS — S99.921A INJURY OF PLANTAR PLATE, RIGHT, INITIAL ENCOUNTER: ICD-10-CM

## 2022-10-11 DIAGNOSIS — M20.11 ACQUIRED HALLUX INTERPHALANGEUS, RIGHT: ICD-10-CM

## 2022-10-11 RX ORDER — ONDANSETRON 4 MG/1
4 TABLET, FILM COATED ORAL EVERY 8 HOURS PRN
Qty: 60 TABLET | Refills: 1 | Status: SHIPPED | OUTPATIENT
Start: 2022-10-11

## 2022-10-12 ENCOUNTER — HOSPITAL ENCOUNTER (OUTPATIENT)
Dept: RADIOLOGY | Facility: HOSPITAL | Age: 62
Discharge: HOME | End: 2022-10-12
Attending: STUDENT IN AN ORGANIZED HEALTH CARE EDUCATION/TRAINING PROGRAM
Payer: MEDICARE

## 2022-10-12 DIAGNOSIS — M79.605 LEG PAIN, BILATERAL: ICD-10-CM

## 2022-10-12 DIAGNOSIS — M54.41 CHRONIC BILATERAL LOW BACK PAIN WITH BILATERAL SCIATICA: Chronic | ICD-10-CM

## 2022-10-12 DIAGNOSIS — M54.42 CHRONIC BILATERAL LOW BACK PAIN WITH BILATERAL SCIATICA: Chronic | ICD-10-CM

## 2022-10-12 DIAGNOSIS — R29.898 WEAKNESS OF BOTH LOWER EXTREMITIES: ICD-10-CM

## 2022-10-12 DIAGNOSIS — M79.604 LEG PAIN, BILATERAL: ICD-10-CM

## 2022-10-12 DIAGNOSIS — G89.29 CHRONIC BILATERAL LOW BACK PAIN WITH BILATERAL SCIATICA: Chronic | ICD-10-CM

## 2022-10-12 PROCEDURE — 72100 X-RAY EXAM L-S SPINE 2/3 VWS: CPT

## 2022-10-14 ENCOUNTER — TELEPHONE (OUTPATIENT)
Dept: NEPHROLOGY | Facility: CLINIC | Age: 62
End: 2022-10-14

## 2022-10-14 ENCOUNTER — TELEPHONE (OUTPATIENT)
Dept: FAMILY MEDICINE CLINIC | Facility: HOSPITAL | Age: 62
End: 2022-10-14

## 2022-10-14 NOTE — TELEPHONE ENCOUNTER
Her labs were reviewed in her renal function is stable, proteinuria stable, no changes to current medications

## 2022-10-17 ENCOUNTER — OFFICE VISIT (OUTPATIENT)
Dept: OCCUPATIONAL THERAPY | Facility: CLINIC | Age: 62
End: 2022-10-17
Payer: MEDICARE

## 2022-10-17 ENCOUNTER — EVALUATION (OUTPATIENT)
Dept: PHYSICAL THERAPY | Facility: CLINIC | Age: 62
End: 2022-10-17
Payer: MEDICARE

## 2022-10-17 ENCOUNTER — TELEPHONE (OUTPATIENT)
Dept: NEPHROLOGY | Facility: CLINIC | Age: 62
End: 2022-10-17

## 2022-10-17 DIAGNOSIS — G89.29 CHRONIC BILATERAL LOW BACK PAIN WITH BILATERAL SCIATICA: Primary | ICD-10-CM

## 2022-10-17 DIAGNOSIS — R29.898 WEAKNESS OF BOTH LOWER EXTREMITIES: ICD-10-CM

## 2022-10-17 DIAGNOSIS — S52.531D CLOSED COLLES' FRACTURE OF RIGHT RADIUS WITH ROUTINE HEALING, SUBSEQUENT ENCOUNTER: ICD-10-CM

## 2022-10-17 DIAGNOSIS — M79.605 LEG PAIN, BILATERAL: ICD-10-CM

## 2022-10-17 DIAGNOSIS — M54.42 CHRONIC BILATERAL LOW BACK PAIN WITH BILATERAL SCIATICA: Primary | ICD-10-CM

## 2022-10-17 DIAGNOSIS — M54.41 CHRONIC BILATERAL LOW BACK PAIN WITH BILATERAL SCIATICA: Primary | ICD-10-CM

## 2022-10-17 DIAGNOSIS — M79.604 LEG PAIN, BILATERAL: ICD-10-CM

## 2022-10-17 DIAGNOSIS — Z47.89 AFTERCARE FOLLOWING SURGERY OF THE MUSCULOSKELETAL SYSTEM: Primary | ICD-10-CM

## 2022-10-17 PROCEDURE — 97112 NEUROMUSCULAR REEDUCATION: CPT | Performed by: PHYSICAL THERAPIST

## 2022-10-17 PROCEDURE — 97110 THERAPEUTIC EXERCISES: CPT | Performed by: OCCUPATIONAL THERAPIST

## 2022-10-17 PROCEDURE — 97163 PT EVAL HIGH COMPLEX 45 MIN: CPT | Performed by: PHYSICAL THERAPIST

## 2022-10-17 NOTE — PROGRESS NOTES
Daily Note     Today's date: 10/17/2022  Patient name: Gabriela Sena  : 1960  MRN: [id number]  Referring provider: Aminta Durham MD  Dx:   Encounter Diagnosis     ICD-10-CM    1  Aftercare following surgery of the musculoskeletal system  Z47 89    2  Closed Colles' fracture of right radius with routine healing, subsequent encounter  S52 050U                   Subjective: I feel okay  Objective: See treatment diary below      Assessment: Tolerated treatment well  Patient has good ROM, progressed with R wrist strengthening  Plan: Continue per plan of care        Precautions: Fall risk      Manuals 10/10 10/17            IE 30'            Jt  Mob wrist  4m                                     Neuro Re-Ed                                                                                                        Ther Ex             Wrist PROM 2m 2m           Thumb IP flexion blocking 2m 2m           Wrist curls #2 3x10 #3 3x10           P/s therabar Red 3x10 Red 3x10           Ball lifts 2 2# 3x10 2 2# 3x10                                                  Ther Activity             FM- peg pickup  Key pegs                        Gait Training                                       Modalities              R 10m 10m

## 2022-10-17 NOTE — TELEPHONE ENCOUNTER
Called patient no answer:  Her labs were reviewed in her renal function is stable, proteinuria stable, no changes to current medications

## 2022-10-17 NOTE — PROGRESS NOTES
PT Evaluation     Today's date: 10/17/2022  Patient name: Devin Limon  : 1960  MRN: [id number]  Referring provider: Nicolas Zepeda DO  Dx:   Encounter Diagnosis     ICD-10-CM    1  Chronic bilateral low back pain with bilateral sciatica  M54 42 Ambulatory Referral to Physical Therapy    M54 41 PT plan of care cert/re-cert    V30 12    2  Weakness of both lower extremities  R29 898 Ambulatory Referral to Physical Therapy     PT plan of care cert/re-cert   3  Leg pain, bilateral  M79 604 Ambulatory Referral to Physical Therapy    M79 605 PT plan of care cert/re-cert       Start Time: 7970  Stop Time: 1530  Total time in clinic (min): 45 minutes    Assessment  Assessment details: Devin Limon is a 63 y o  female who presents with increased low back pain and LE weakness consistent with referring diagnosis of Chronic bilateral low back pain with bilateral sciatica  (primary encounter diagnosis)  Weakness of both lower extremities  Leg pain, bilateral that is complex secondary to chronic onset, moderate fear avoidance and high pain levels with highly complex PMH  Clinically demonstrates decreased lumbar ROM, decreased core and hip strength, decreased LE and core/posture proprioception leading to pain with ADLs and exercise  This suggests the need for skilled OPPT to address the above listed impairments, achieve established goals and return to PLOF pain-free  If you have any questions or concerns please contact me at [phone]  Thank you!   Impairments: abnormal coordination, abnormal muscle firing, abnormal muscle tone, abnormal or restricted ROM, activity intolerance, impaired balance, impaired physical strength, lacks appropriate home exercise program, pain with function, weight-bearing intolerance, poor posture  and poor body mechanics    Symptom irritability: highUnderstanding of Dx/Px/POC: fair   Prognosis: fair    Goals  Short Term Goals (4 weeks)  1 ) Establish independence with HEP  2 ) Decrease subjective pain levels from NPRS at least to 2-5/10 at rest and with activity  3 ) Improve L/S ROM at least 5-10 degrees into all planes to allow for improved ease of movement with less guarding    Long Term Goals (8 weeks)  1 ) Improve L/S ROM to WNL in all planes to restore normal movement with ADLs and function  2 ) Improve hip ABD and ER strength to 5/5 in all planes in order to return to pain-free ADLs and function  3 ) Improve FOTO score at least to 75 points showing improved self reported disability     Plan  Plan details: Initiate POC for L/S ROM, strength and stability; monitor sxxs and progress as able  Patient would benefit from: skilled physical therapy and PT eval  Planned modality interventions: biofeedback, cryotherapy, electrical stimulation/Russian stimulation and TENS  Planned therapy interventions: abdominal trunk stabilization, activity modification, ADL retraining, ADL training, balance, balance/weight bearing training, behavior modification, IADL retraining, joint mobilization, manual therapy, motor coordination training, neuromuscular re-education, patient education, postural training, self care, sensory integrative techniques, strengthening, stretching, therapeutic activities, therapeutic exercise, therapeutic training, transfer training, home exercise program, graded exercise, graded activity, graded motor, gait training, functional ROM exercises, flexibility, coordination and body mechanics training  Frequency: 2x week  Duration in visits: 16  Duration in weeks: 8  Plan of Care beginning date: 10/17/2022  Plan of Care expiration date: 12/19/2022  Treatment plan discussed with: patient        Subjective Evaluation    History of Present Illness  Date of onset: 8/29/2022  Mechanism of injury: Dante Forbes is a 63 y o  female who presents with increased LBP and LE pain/weakness and sciatica starting ~ 6 weeks ago with VAUGHN   Notes she had prior difficulty with sciatica years ago with bending forwards and getting back up  Notes that this pain currently is more painful and B/L LEs are rubbery and having discomfort with performance and increased L foot pain "feels like I broke it, but I don't know what I did to it"  Notes having most L foot pain with walking and improved with sandals  Reports having most pain with walking, getting up and standing  Decided to seek out MD consult where X-rays were (-) for fx and script for OPPT provided  No referral for pain management yet  Reports that her knees have a rubbery feeling  Denies night pain or changes in bowel or bladder function- just difficulty with getting off the toilet  Denies any NT signs or sxs  F/U with MD in 1 month  Wishes to return to PLOF pain-free  Recurrent probem    Quality of life: fair    Pain  Current pain ratin  At best pain ratin  At worst pain rating: 10  Location: L/S and LEs  Quality: throbbing, dull ache and sharp  Relieving factors: relaxation, rest, support, medications and change in position  Aggravating factors: walking, standing and stair climbing  Progression: worsening    Social Support  Steps to enter house: yes  3    Lives in: condominium  Lives with: sister      Employment status: not working  Exercise history: No would like to walk       Diagnostic Tests  X-ray: abnormal  Treatments  Previous treatment: physical therapy  Current treatment: physical therapy  Patient Goals  Patient goals for therapy: decreased edema, decreased pain, improved balance, increased motion, increased strength, independence with ADLs/IADLs and return to sport/leisure activities  Patient goal: Stand up straight; get my legs feeling better and get up with no problem         Objective     Neurological Testing     Sensation     Lumbar   Left   Intact: light touch  Hypersensation: light touch    Comments   Left light touch: L3-L5 hyper    Reflexes   Left   Patellar (L4): brisk (3+)  Achilles (S1): normal (2+)    Right   Patellar (L4): brisk (3+)  Achilles (S1): normal (2+)    Active Range of Motion     Lumbar   Flexion: 130 degrees   Extension: 5 degrees   Left lateral flexion: 35 degrees       Right lateral flexion: 40 degrees   Left rotation: 65 degrees   Right rotation: 75 degrees   Left Hip   External rotation (90/90): 27 degrees   Internal rotation (90/90): 27 degrees     Right Hip   External rotation (90/90): 32 degrees   Internal rotation (90/90): 33 degrees     Strength/Myotome Testing     Left Hip   Planes of Motion   Flexion: 4+  Extension: 3+  Abduction: 3-  Adduction: 4+  External rotation: 4  Internal rotation: 4    Right Hip   Planes of Motion   Flexion: 4-  Extension: 4-  Abduction: 4+  Adduction: 4+  External rotation: 5  Internal rotation: 5    Left Knee   Flexion: 4+  Extension: 4+    Right Knee   Flexion: 5  Extension: 5    Left Ankle/Foot   Dorsiflexion: 4-    Right Ankle/Foot   Dorsiflexion: 5    Tests     Lumbar     Left   Positive quadrant  Right   Negative quadrant  Functional Assessment      Squat    Left within functional limits, right within functional limits, left varus and right varus  Single Leg Stance   Left: 3 seconds  Right: 2 seconds      ? Precautions: Pancreatitis; parathyroidisim; HTN; spondylolisthesis; chronic stage 4 kidney disease; OCD; Bipolar; Panic disorder; Anxiety; agorophobia  EPOC: 12/19/22  HEP: Access Code: [email or web address]  URL: [email or web address]  Date: 10/17/2022  Prepared by: Ami Aas    Exercises  Supine Posterior Pelvic Tilt - 1 x daily - 7 x weekly - 3 sets - 10 reps  Supine 90/90 Alternating Heel Touches with Posterior Pelvic Tilt - 1 x daily - 7 x weekly - 3 sets - 10 reps  Standing Posterior Pelvic Tilt - 1 x daily - 7 x weekly - 3 sets - 10 reps  Standing Pelvic Tilts with March At [address] With Pelvic Floor Contraction - 1 x daily - 7 x weekly - 3 sets - 10 reps          Manuals 10/17            LE PROM             STM to L/S Neuro Re-Ed             PPT 10x supine            STD PPT with march 10x STD            Supine NSP marching 10x ea                                                                Ther Ex             SLR x 3             Clamshells             Bridges                                                                              Ther Activity             Bike             TM walking             Gait Training                                       Modalities

## 2022-10-19 ENCOUNTER — TELEPHONE (OUTPATIENT)
Dept: NEPHROLOGY | Facility: CLINIC | Age: 62
End: 2022-10-19

## 2022-10-19 NOTE — TELEPHONE ENCOUNTER
rec'd phone call from patient  After looking at her upcoming appointments, she realized her next appt was scheduled with Matt Valencia and not Dr Clarence Aviles  Requested to see Dr Clarence Aviles   Appt was rescheduled to 12/7 at 9:30 in [facility]

## 2022-10-21 DIAGNOSIS — F31.81 BIPOLAR 2 DISORDER (HCC): Chronic | ICD-10-CM

## 2022-10-21 NOTE — TELEPHONE ENCOUNTER
Addendum Note by Graham Cummings, RN at 8/14/2020  2:48 PM     Author: Graham Cummings RN Service: -- Author Type: Registered Nurse    Filed: 9/14/2020  9:35 AM Encounter Date: 8/14/2020 Status: Signed    : Graham Cummings RN (Registered Nurse)    Addended by: GRAHAM CUMMINGS on: 9/14/2020 09:35 AM        Modules accepted: Orders         Spoke to patient and advise her renal fuction is stable and proteinuria stable, no changes to her current medication  No further questions at this time

## 2022-10-22 RX ORDER — CLONAZEPAM 0.5 MG/1
0.5 TABLET ORAL 3 TIMES DAILY
Qty: 270 TABLET | Refills: 0 | Status: SHIPPED | OUTPATIENT
Start: 2022-10-22

## 2022-10-24 ENCOUNTER — OFFICE VISIT (OUTPATIENT)
Dept: OCCUPATIONAL THERAPY | Facility: CLINIC | Age: 62
End: 2022-10-24
Payer: MEDICARE

## 2022-10-24 ENCOUNTER — OFFICE VISIT (OUTPATIENT)
Dept: PHYSICAL THERAPY | Facility: CLINIC | Age: 62
End: 2022-10-24
Payer: MEDICARE

## 2022-10-24 DIAGNOSIS — G89.29 CHRONIC BILATERAL LOW BACK PAIN WITH BILATERAL SCIATICA: Primary | ICD-10-CM

## 2022-10-24 DIAGNOSIS — Z47.89 AFTERCARE FOLLOWING SURGERY OF THE MUSCULOSKELETAL SYSTEM: Primary | ICD-10-CM

## 2022-10-24 DIAGNOSIS — M54.41 CHRONIC BILATERAL LOW BACK PAIN WITH BILATERAL SCIATICA: Primary | ICD-10-CM

## 2022-10-24 DIAGNOSIS — R29.898 WEAKNESS OF BOTH LOWER EXTREMITIES: ICD-10-CM

## 2022-10-24 DIAGNOSIS — M54.42 CHRONIC BILATERAL LOW BACK PAIN WITH BILATERAL SCIATICA: Primary | ICD-10-CM

## 2022-10-24 DIAGNOSIS — S52.531D CLOSED COLLES' FRACTURE OF RIGHT RADIUS WITH ROUTINE HEALING, SUBSEQUENT ENCOUNTER: ICD-10-CM

## 2022-10-24 PROCEDURE — 97110 THERAPEUTIC EXERCISES: CPT

## 2022-10-24 PROCEDURE — 97112 NEUROMUSCULAR REEDUCATION: CPT

## 2022-10-24 PROCEDURE — 97140 MANUAL THERAPY 1/> REGIONS: CPT

## 2022-10-24 PROCEDURE — 97110 THERAPEUTIC EXERCISES: CPT | Performed by: OCCUPATIONAL THERAPIST

## 2022-10-24 NOTE — PROGRESS NOTES
Daily Note     Today's date: 10/24/2022  Patient name: Kye Malloy  : 1960  MRN: [id number]  Referring provider: Nayeli Taylor DO  Dx:   Encounter Diagnosis     ICD-10-CM    1  Chronic bilateral low back pain with bilateral sciatica  M54 42     M54 41     G89 29    2  Weakness of both lower extremities  R29 898                   Subjective: Pt reports she hurts all over  Knees are bothering her the most followed by low back pain and shoulder pain  Objective: See treatment diary below      Assessment: Initiated PT POC today  Pt in a lot of pain today- TE's were tolerated minimally  Benefited from manual therapy with slight relief post  Encouraged to try to be more consistent with HEP  Tolerated treatment well  Patient demonstrated fatigue post treatment, exhibited good technique with therapeutic exercises and would benefit from continued PT      Plan: Continue per plan of care  Progress treatment as tolerated  Precautions: Pancreatitis; parathyroidisim; HTN; spondylolisthesis; chronic stage 4 kidney disease; OCD; Bipolar; Panic disorder; Anxiety; agorophobia  EPOC: 22  HEP: Access Code: [email or web address]  URL: [email or web address]  Date: 10/17/2022  Prepared by: Pauliee Cogan    Exercises  · Supine Posterior Pelvic Tilt - 1 x daily - 7 x weekly - 3 sets - 10 reps  · Supine 90/90 Alternating Heel Touches with Posterior Pelvic Tilt - 1 x daily - 7 x weekly - 3 sets - 10 reps  · Standing Posterior Pelvic Tilt - 1 x daily - 7 x weekly - 3 sets - 10 reps  · Standing Pelvic Tilts with March At [facility] With Pelvic Floor Contraction - 1 x daily - 7 x weekly - 3 sets - 10 reps          Manuals 10/17 10/24           LE PROM  WE           STM to L/S  WE                                     Neuro Re-Ed             PPT 10x supine x10           STD PPT with march 10x STD x10           Supine NSP marching 10x ea x10                                                               Ther Ex SLR x 3  x10 ea           Clamshells             Bridges                                                                              Ther Activity             Bike             TM walking             Gait Training                                       Modalities

## 2022-10-24 NOTE — PROGRESS NOTES
Daily Note     Today's date: 10/24/2022  Patient name: Babar Rosa  : 1960  MRN: [id number]  Referring provider: Viri Roy MD  Dx:   Encounter Diagnosis     ICD-10-CM    1  Aftercare following surgery of the musculoskeletal system  Z47 89    2  Closed Colles' fracture of right radius with routine healing, subsequent encounter  S59 972T                   Subjective: No new complaints    Objective: See treatment diary below      Assessment: Tolerated treatment well  Patient has good ROM, progressed with R wrist strengthening  Plan: Continue per plan of care        Precautions: Fall risk      Manuals 10/10 10/17 10/24           IE 30'            Jt  Mob wrist  4m 4m                                    Neuro Re-Ed                                                                                                        Ther Ex             Wrist PROM 2m 2m 2m          Thumb IP flexion blocking 2m 2m 2m          Wrist curls #2 3x10 #3 3x10 #3 3x10          P/s therabar Red 3x10 Red 3x10 Red 3x10          Ball lifts 2 2# 3x10 2 2# 3x10 2 2# 3x10                                                 Ther Activity             FM- peg pickup  Key pegs                        Gait Training                                       Modalities              R 10m 10m 10m

## 2022-10-31 ENCOUNTER — OFFICE VISIT (OUTPATIENT)
Dept: PODIATRY | Facility: CLINIC | Age: 62
End: 2022-10-31

## 2022-10-31 VITALS — WEIGHT: 225 LBS | BODY MASS INDEX: 35.31 KG/M2 | HEIGHT: 67 IN

## 2022-10-31 DIAGNOSIS — B35.1 ONYCHOMYCOSIS: Primary | ICD-10-CM

## 2022-10-31 NOTE — PROGRESS NOTES
PATIENT:  Torres Bill [facility]  1960    ASSESSMENT/PLAN:  1  Onychomycosis            The patient was educated in her diagnosis  Instructed skin care and protection  Nails trimmed  She wishes to have periodic foot care and RA in 10 weeks  HPI:  Daniela Marsh is a 63 y  o year old female seen for chief complaint of elongated nails  She did not trim her toenails since the last visit  The patient denied any acute pedal disorder        PAST MEDICAL HISTORY:  Past Medical History:   Diagnosis Date   • Anxiety    • Anxiety disorder    • At risk for falls    • Bipolar 2 disorder ([facility] [address] )    • Chronic back pain    • Chronic kidney disease    • Closed fracture of distal end of right fibula with routine healing 2020   • COVID-19     in 2021   • CVA (cerebral vascular accident) [facility])     noted on MRI in the past   • Depression    • GERD (gastroesophageal reflux disease)    • Hypercholesteremia    • Hypernatremia    • Hypertension    • Hypokalemia    • Idiopathic chronic pancreatitis ([facility] [address] ) 2018   • Intervertebral disc disorder with radiculopathy of lumbosacral region     resolved: 2015   • Kidney disease    • Limb alert care status     LUE-fistula   • Panic attacks    • Pericardial effusion    • PONV (postoperative nausea and vomiting)    • Radiculitis     resolved: 2015   • [facility]    • Vitamin D deficiency        PAST SURGICAL HISTORY:  Past Surgical History:   Procedure Laterality Date   • BUNIONECTOMY      Left foot    • CAST APPLICATION Right 3/68/6232    Procedure: Application short-arm thumb spica splint;  Surgeon: Denisse Palencia MD;  Location: [facility] OR;  Service: Orthopedics   • COLON SURGERY     • COLONOSCOPY  2021   • DILATION AND CURETTAGE OF UTERUS     • INDUCED       surgically induced   • NY ANASTOMOSIS,AV,ANY SITE Left 2019    Procedure: CREATION FISTULA ARTERIOVENOUS (AV) left wrists possible left upper;  Surgeon: Yolanda Valencia MD;  Location: [facility];  Service: Vascular   • Piroska U  97  W/SESMDC W/DIST Jonita Josee Left 7/1/2019    Procedure: Adrienne Artist;  Surgeon: Khushbu Ibarra DPM;  Location: [facility];  Service: Podiatry   • Piroska U  97  W/SESMDC W/DIST Jonita Josee Right 8/3/2020    Procedure: Pedro Pablo Spangler;  Surgeon: Khushbu Ibarra DPM;  Location: [facility];  Service: Podiatry   • AR CORRJ HALLUX VALGUS W/SESMDC W/PROX PHLNX OSTEOT Right 9/27/2021    Procedure: [place], right tameka osteotomy and 2nd claw toe correction;  Surgeon: Siddharth Zhoa MD;  Location: [facility];  Service: Orthopedics   • AR ERCP DX COLLECTION SPECIMEN BRUSHING/WASHING N/A 4/11/2018    Procedure: ENDOSCOPIC RETROGRADE CHOLANGIOPANCREATOGRAPHY (ERCP); Surgeon: Safia Bullard MD;  Location[facility];  Service: Gastroenterology   • AR FINGER TENDON TRANSFER,2-5 FINGRS Right 6/23/2022    Procedure: Right ring finger flexor digitorum superficialis to flexor pollicis longus tendon transfer;  Surgeon: Belkis Rmoero MD;  [facility];  Service: Orthopedics   • AR LAP, SURG PROCTOPEXY N/A 7/13/2018    Procedure: ROBOTIC SIGMOID RESECTION / RECTOPEXY;  Surgeon: Tea Abreu MD;  Location: [facility];  Service: Colorectal   • AR OPEN TREATMENT RADIAL SHAFT FRACTURE Right 10/11/2021    Procedure: OPEN REDUCTION W/ INTERNAL FIXATION (ORIF) RADIUS (WRIST), RIGHT DISTAL;  Surgeon: Steven Thomas MD;  Location: [facility];  Service: Orthopedics   • AR REMOVAL DEEP IMPLANT Right 6/23/2022    Procedure: Removal of hardware volar aspect right distal radius (distal radial plate and screws);   Surgeon: Belkis Romero MD;  [facility]: [facility];  Service: Orthopedics   • AR SIGMOIDOSCOPY FLX DX W/COLLJ SPEC BR/WA IF PFRMD N/A 7/13/2018    Procedure: SIGMOIDOSCOPY FLEXIBLE;  Surgeon: Tea Abreu MD;  [facility];  Service: Colorectal   • TUBAL LIGATION Bilateral 1997   • US GUIDED THYROID BIOPSY  7/30/2019        ALLERGIES:  Pollen extract    MEDICATIONS:  Current Outpatient Medications   Medication Sig Dispense Refill   • acetaminophen (TYLENOL) 650 mg CR tablet Take 1 tablet (650 mg total) by mouth every 8 (eight) hours as needed for mild pain 30 tablet 0   • albuterol (Ventolin HFA) 90 mcg/act inhaler Inhale 2 puffs every 6 (six) hours as needed for wheezing or shortness of breath 8 5 g 3   • AMILoride 5 mg tablet Take 1 tablet (5 mg total) by mouth 2 (two) times a day 180 tablet 3   • aspirin 81 mg chewable tablet Chew 1 tablet (81 mg total) daily     • carvedilol (COREG) 25 mg tablet Take 1 tablet (25 mg total) by mouth 2 (two) times a day with meals 180 tablet 3   • cholecalciferol (VITAMIN D3) 1,000 units tablet Take 5 tablets (5,000 Units total) by mouth daily 90 tablet 5   • clonazePAM (KlonoPIN) 0 5 mg tablet Take 1 tablet (0 5 mg total) by mouth 3 (three) times a day 270 tablet 0   • fluvoxaMINE (LUVOX) 100 mg tablet 2 (two) times a day 1 tab am , 2 tabs HS     • lamoTRIgine (LaMICtal) 200 MG tablet Take 1 tablet (200 mg total) by mouth 2 (two) times a day 60 tablet 1   • linaCLOtide (Linzess) 290 MCG CAPS Take 1 capsule by mouth daily 90 capsule 0   • naloxone (NARCAN) 4 mg/0 1 mL nasal spray Administer 1 spray into a nostril  If no response after 2-3 minutes, give another dose in the other nostril using a new spray   1 each 1   • omeprazole (PriLOSEC) 40 MG capsule Take 1 capsule (40 mg total) by mouth daily before breakfast 90 capsule 3   • ondansetron (ZOFRAN) 4 mg tablet Take 1 tablet (4 mg total) by mouth every 8 (eight) hours as needed for nausea or vomiting 60 tablet 1   • Polyethylene Glycol 3350 (MIRALAX PO) Take by mouth as needed      • QUEtiapine (SEROquel) 100 mg tablet Take 1 tablet by mouth daily at bedtime 100mg with 400mg bedtime      • QUEtiapine (SEROquel) 300 mg tablet Take 300 mg by mouth in the morning 300mg in morning • QUEtiapine (SEROquel) 400 MG tablet Take 400 mg by mouth daily at bedtime       • rosuvastatin (CRESTOR) 20 MG tablet Take 1 tablet (20 mg total) by mouth every evening 90 tablet 3   • traMADol (Ultram) 50 mg tablet Take 1 tablet (50 mg total) by mouth every 8 (eight) hours as needed for severe pain 60 tablet 0     No current facility-administered medications for this visit  SOCIAL HISTORY:  Social History     Socioeconomic History   • Marital status:      Spouse name: None   • Number of children: None   • Years of education: None   • Highest education level: None   Occupational History   • 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   Tobacco Use   • Smoking status: Never Smoker   • Smokeless tobacco: Never Used   Vaping Use   • Vaping Use: Never used   Substance and Sexual Activity   • Alcohol use: Never   • Drug use: Not Currently   • Sexual activity: Not Currently   Other Topics Concern   • None   Social History Narrative    Daily caffeine consumption 2-3 servings a day    Lives with family  No living will  Has dentures---no dental care  Primary language--English  Feels safe at home  Social Determinants of Health     Financial Resource Strain: Not on file   Food Insecurity: Not on file   Transportation Needs: Not on file   Physical Activity: Not on file   Stress: Not on file   Social Connections: Not on file   Intimate Partner Violence: Not on file   Housing Stability: Not on file        REVIEW OF SYSTEMS:  GENERAL: No fever or chills  HEART: No chest pain, or palpitation  RESPIRATORY:  No acute SOB or cough  GI: No Nausea, vomit or diarrhea  NEUROLOGIC: No syncope or acute weakness    PHYSICAL EXAM:  VASCULAR EXAM  Pedal pulses are intact  CRT is WNL  NEUROLOGIC EXAM  Sensation is intact to light touch  No focal neurologic deficit  DERMATOLOGIC EXAM:   No ulcer or cellulitis noted  The patient has thick, elongated toenails  No notable skin lesion        MUSCULOSKELETAL EXAM: No acute joint pain, edema, or redness  No acute musculoskeletal problem

## 2022-11-02 ENCOUNTER — OFFICE VISIT (OUTPATIENT)
Dept: OCCUPATIONAL THERAPY | Facility: CLINIC | Age: 62
End: 2022-11-02

## 2022-11-02 ENCOUNTER — OFFICE VISIT (OUTPATIENT)
Dept: PHYSICAL THERAPY | Facility: CLINIC | Age: 62
End: 2022-11-02

## 2022-11-02 DIAGNOSIS — M79.605 LEG PAIN, BILATERAL: ICD-10-CM

## 2022-11-02 DIAGNOSIS — M79.604 LEG PAIN, BILATERAL: ICD-10-CM

## 2022-11-02 DIAGNOSIS — R29.898 WEAKNESS OF BOTH LOWER EXTREMITIES: ICD-10-CM

## 2022-11-02 DIAGNOSIS — Z47.89 AFTERCARE FOLLOWING SURGERY OF THE MUSCULOSKELETAL SYSTEM: Primary | ICD-10-CM

## 2022-11-02 DIAGNOSIS — M54.42 CHRONIC BILATERAL LOW BACK PAIN WITH BILATERAL SCIATICA: Primary | ICD-10-CM

## 2022-11-02 DIAGNOSIS — S52.531D CLOSED COLLES' FRACTURE OF RIGHT RADIUS WITH ROUTINE HEALING, SUBSEQUENT ENCOUNTER: ICD-10-CM

## 2022-11-02 DIAGNOSIS — M54.41 CHRONIC BILATERAL LOW BACK PAIN WITH BILATERAL SCIATICA: Primary | ICD-10-CM

## 2022-11-02 DIAGNOSIS — G89.29 CHRONIC BILATERAL LOW BACK PAIN WITH BILATERAL SCIATICA: Primary | ICD-10-CM

## 2022-11-02 NOTE — PROGRESS NOTES
Daily Note     Today's date: 2022  Patient name: Daniela Marsh  : 1960  MRN: [id number]  Referring provider: Jesus Sarabia MD  Dx:   Encounter Diagnosis     ICD-10-CM    1  Aftercare following surgery of the musculoskeletal system  Z47 89    2  Closed Colles' fracture of right radius with routine healing, subsequent encounter  S50 790R                   Subjective: No new complaints     Objective: See treatment diary below      Assessment: Tolerated treatment well  Patient has good ROM, progressed with R wrist strengthening  Plan: Continue per plan of care        Precautions: Fall risk      Manuals 10/10 10/17 10/24 11/2          IE 27'            Jt  Mob wrist  4m 4m 4m                                   Neuro Re-Ed                                                                                                        Ther Ex             Wrist PROM 2m 2m 2m 2m         Thumb IP flexion blocking 2m 2m 2m 2m         Wrist curls #2 3x10 #3 3x10 #3 3x10 #3 3x10         P/s therabar Red 3x10 Red 3x10 Red 3x10 Red 3x10         Ball lifts 2 2# 3x10 2 2# 3x10 2 2# 3x10 2 2# 3x10                                                Ther Activity             FM- peg pickup  Key pegs                        Gait Training                                       Modalities              R 10m 10m 10m 10m

## 2022-11-02 NOTE — PROGRESS NOTES
Daily Note     Today's date: 2022  Patient name: Deon Moreno  : 1960  MRN: [id number]  Referring provider: Rober Brasher DO  Dx:   Encounter Diagnosis     ICD-10-CM    1  Chronic bilateral low back pain with bilateral sciatica  M54 42     M54 41     G89 29    2  Leg pain, bilateral  M79 604     M79 605    3  Weakness of both lower extremities  R29 898                   Subjective: Reports falling Friday after tripping over her coffee table  Landed on her right side  Denies hitting her head or other injury  Back pain 8/10; knee's 8/10 " burning and cracking  " " I've been doing my HEP sparingly  "       Objective: See treatment diary below      Assessment: Tolerated treatment poor  Patient refused light warm up on bike and did not want to progress exercises  Cues for improved TA recruitment throughout and upright posture with standing exercises  Patient demonstrated fatigue post treatment, exhibited good technique with therapeutic exercises and would benefit from continued PT      Plan: Continue per plan of care  Progress treatment as tolerated  Precautions: Pancreatitis; parathyroidisim; HTN; spondylolisthesis; chronic stage 4 kidney disease; OCD; Bipolar; Panic disorder; Anxiety; agorophobia  EPOC: 22  HEP: Access Code: [email or web address]  URL: [email or web address]  Date: 10/17/2022  Prepared by: Venus Porras    Exercises  · Supine Posterior Pelvic Tilt - 1 x daily - 7 x weekly - 3 sets - 10 reps  · Supine 90/90 Alternating Heel Touches with Posterior Pelvic Tilt - 1 x daily - 7 x weekly - 3 sets - 10 reps  · Standing Posterior Pelvic Tilt - 1 x daily - 7 x weekly - 3 sets - 10 reps  · Standing Pelvic Tilts with March At [facility] With Pelvic Floor Contraction - 1 x daily - 7 x weekly - 3 sets - 10 reps          Manuals 10/17 10/24 11/2          LE PROM  WE CM          STM to L/S  WE CM                                    Neuro Re-Ed             PPT 10x supine x10 3"x10 STD PPT with march 10x STD x10 x10          Supine NSP marching 10x ea x10 3"x10 ea  Ther Ex   11/2          SLR x 3  x10 ea x10 ea             Jarrod Bang Raddle                                                                              Ther Activity   11/2          Bike   patient refused          TM walking             Gait Training                                       Modalities

## 2022-11-03 DIAGNOSIS — I10 ACCELERATED HYPERTENSION: ICD-10-CM

## 2022-11-03 DIAGNOSIS — N18.4 CKD (CHRONIC KIDNEY DISEASE) STAGE 4, GFR 15-29 ML/MIN (HCC): Primary | ICD-10-CM

## 2022-11-03 DIAGNOSIS — N18.4 STAGE 4 CHRONIC KIDNEY DISEASE (HCC): Primary | ICD-10-CM

## 2022-11-03 DIAGNOSIS — N28.1 RENAL CYST: ICD-10-CM

## 2022-11-03 DIAGNOSIS — F31.9 BIPOLAR 1 DISORDER (HCC): Primary | ICD-10-CM

## 2022-11-03 DIAGNOSIS — K59.03 DRUG-INDUCED CONSTIPATION: ICD-10-CM

## 2022-11-03 DIAGNOSIS — E55.9 VITAMIN D DEFICIENCY: ICD-10-CM

## 2022-11-03 RX ORDER — QUETIAPINE FUMARATE 300 MG/1
300 TABLET, FILM COATED ORAL DAILY
Qty: 30 TABLET | Refills: 0 | Status: SHIPPED | OUTPATIENT
Start: 2022-11-03 | End: 2022-11-09 | Stop reason: SDUPTHER

## 2022-11-03 RX ORDER — QUETIAPINE FUMARATE 100 MG/1
100 TABLET, FILM COATED ORAL
Qty: 30 TABLET | Refills: 0 | Status: SHIPPED | OUTPATIENT
Start: 2022-11-03 | End: 2022-11-09 | Stop reason: SDUPTHER

## 2022-11-03 RX ORDER — LAMOTRIGINE 200 MG/1
200 TABLET ORAL 2 TIMES DAILY
Qty: 60 TABLET | Refills: 0 | Status: SHIPPED | OUTPATIENT
Start: 2022-11-03 | End: 2022-11-09 | Stop reason: SDUPTHER

## 2022-11-03 RX ORDER — QUETIAPINE FUMARATE 400 MG/1
400 TABLET, FILM COATED ORAL
Qty: 30 TABLET | Refills: 0 | Status: SHIPPED | OUTPATIENT
Start: 2022-11-03 | End: 2022-11-09 | Stop reason: SDUPTHER

## 2022-11-03 RX ORDER — FLUVOXAMINE MALEATE 100 MG
TABLET ORAL
Qty: 90 TABLET | Refills: 0 | Status: SHIPPED | OUTPATIENT
Start: 2022-11-03

## 2022-11-03 NOTE — TELEPHONE ENCOUNTER
Patient needs RF for the following meds until appt on 11/16/2022: Lamictal 200mg, Seroquel 100mg, 300mg, 400mg to [facility]   Fluvoxamine 100mg needs to go to [facility] DR KARTHIKEYAN HIGGINBOTHAM

## 2022-11-07 ENCOUNTER — OFFICE VISIT (OUTPATIENT)
Dept: OCCUPATIONAL THERAPY | Facility: CLINIC | Age: 62
End: 2022-11-07

## 2022-11-07 ENCOUNTER — OFFICE VISIT (OUTPATIENT)
Dept: PHYSICAL THERAPY | Facility: CLINIC | Age: 62
End: 2022-11-07

## 2022-11-07 DIAGNOSIS — S52.531D CLOSED COLLES' FRACTURE OF RIGHT RADIUS WITH ROUTINE HEALING, SUBSEQUENT ENCOUNTER: ICD-10-CM

## 2022-11-07 DIAGNOSIS — M79.605 LEG PAIN, BILATERAL: ICD-10-CM

## 2022-11-07 DIAGNOSIS — M54.42 CHRONIC BILATERAL LOW BACK PAIN WITH BILATERAL SCIATICA: Primary | ICD-10-CM

## 2022-11-07 DIAGNOSIS — Z47.89 AFTERCARE FOLLOWING SURGERY OF THE MUSCULOSKELETAL SYSTEM: Primary | ICD-10-CM

## 2022-11-07 DIAGNOSIS — M79.601 PAIN OF RIGHT UPPER EXTREMITY: ICD-10-CM

## 2022-11-07 DIAGNOSIS — M54.41 CHRONIC BILATERAL LOW BACK PAIN WITH BILATERAL SCIATICA: Primary | ICD-10-CM

## 2022-11-07 DIAGNOSIS — G89.29 CHRONIC BILATERAL LOW BACK PAIN WITH BILATERAL SCIATICA: Primary | ICD-10-CM

## 2022-11-07 DIAGNOSIS — M79.604 LEG PAIN, BILATERAL: ICD-10-CM

## 2022-11-07 NOTE — PROGRESS NOTES
Daily Note     Today's date: 2022  Patient name: Ayleen Grayson  : 1960  MRN: [id number]  Referring provider: Jesi Mark DO  Dx:   Encounter Diagnosis     ICD-10-CM    1  Chronic bilateral low back pain with bilateral sciatica  M54 42     M54 41     G89 29    2  Leg pain, bilateral  M79 604     M79 605                   Subjective: Pt reports she is doing a little better with her back but knees are bothering her a little more now  Objective: See treatment diary below      Assessment: Tolerated treatment fair  She seemed to be ambulating much better this visit  She was able to get out of her chair much easier and transitions on mat table were more fluent and less painful  She showed improved tolerance to the TE's  Noted relief in tightness post STM  Her knees are bothering her now a little more than her back  Patient demonstrated fatigue post treatment, exhibited good technique with therapeutic exercises and would benefit from continued PT      Plan: Continue per plan of care  Progress treatment as tolerated  Precautions: Pancreatitis; parathyroidisim; HTN; spondylolisthesis; chronic stage 4 kidney disease; OCD; Bipolar; Panic disorder; Anxiety; agorophobia  EPOC: 22  HEP: Access Code: [email or web address]  URL: [email or web address]  Date: 10/17/2022  Prepared by: Kimberly Interiano    Exercises  · Supine Posterior Pelvic Tilt - 1 x daily - 7 x weekly - 3 sets - 10 reps  · Supine 90/90 Alternating Heel Touches with Posterior Pelvic Tilt - 1 x daily - 7 x weekly - 3 sets - 10 reps  · Standing Posterior Pelvic Tilt - 1 x daily - 7 x weekly - 3 sets - 10 reps  · Standing Pelvic Tilts with March At [facility] With Pelvic Floor Contraction - 1 x daily - 7 x weekly - 3 sets - 10 reps          Manuals 10/17 10/24 11/2 11/7         LE PROM  WE CM WE         STM to L/S  WE CM WE                                   Neuro Re-Ed             PPT 10x supine x10 3"x10 5"x10         STD PPT with march 10x STD x10 x10 x10 ea         Supine NSP marching 10x ea x10 3"x10 ea  3"x10         Supine NSP heel slides    x10 ea                                                Ther Ex   11/2          SLR x 3  x10 ea x10 ea    x10 ea         Clamshells    x10 ea         Bridges    x10         Pball Lumbar Flx stretch    x20         Seated HS stretch    5"x10 ea                                                Ther Activity   11/2          Bike   patient refused          TM walking             Gait Training                                       Modalities

## 2022-11-07 NOTE — PROGRESS NOTES
Daily Note     Today's date: 2022  Patient name: Shaina Meehan  : 1960  MRN: [id number]  Referring provider: Maida Lafleur MD  Dx:   Encounter Diagnosis     ICD-10-CM    1  Aftercare following surgery of the musculoskeletal system  Z47 89    2  Closed Colles' fracture of right radius with routine healing, subsequent encounter  S57 516M                   Subjective: No new complaints     Objective: See treatment diary below      Assessment: Tolerated treatment well  Patient has good ROM, progressed with R wrist strengthening  She is able to perform all ADLs  Plan: Continue per plan of care        Precautions: Fall risk      Manuals 10/10 10/17 10/24 11/2 11/7         IE 30'            Jt  Mob wrist  4m 4m 4m 4m                                  Neuro Re-Ed                                                                                                        Ther Ex             Wrist PROM 2m 2m 2m 2m 2m        Thumb IP flexion blocking 2m 2m 2m 2m 2m        Wrist curls #2 3x10 #3 3x10 #3 3x10 #3 3x10 #3 3x10        P/s therabar Red 3x10 Red 3x10 Red 3x10 Red 3x10 Red 3x10        Ball lifts 2 2# 3x10 2 2# 3x10 2 2# 3x10 2 2# 3x10 2 2# 3x10                                               Ther Activity             FM- peg pickup  Key pegs                        Gait Training                                       Modalities              R 10m 10m 10m 10m 10m

## 2022-11-08 ENCOUNTER — RA CDI HCC (OUTPATIENT)
Dept: OTHER | Facility: HOSPITAL | Age: 62
End: 2022-11-08

## 2022-11-08 ENCOUNTER — TELEPHONE (OUTPATIENT)
Dept: FAMILY MEDICINE CLINIC | Facility: HOSPITAL | Age: 62
End: 2022-11-08

## 2022-11-08 DIAGNOSIS — M25.562 CHRONIC PAIN OF BOTH KNEES: Primary | ICD-10-CM

## 2022-11-08 DIAGNOSIS — G89.29 CHRONIC PAIN OF BOTH KNEES: Primary | ICD-10-CM

## 2022-11-08 DIAGNOSIS — M79.672 LEFT FOOT PAIN: ICD-10-CM

## 2022-11-08 DIAGNOSIS — M25.561 CHRONIC PAIN OF BOTH KNEES: Primary | ICD-10-CM

## 2022-11-08 DIAGNOSIS — M25.572 CHRONIC PAIN OF LEFT ANKLE: ICD-10-CM

## 2022-11-08 DIAGNOSIS — G89.29 CHRONIC PAIN OF LEFT ANKLE: ICD-10-CM

## 2022-11-08 RX ORDER — TRAMADOL HYDROCHLORIDE 50 MG/1
50 TABLET ORAL EVERY 8 HOURS PRN
Qty: 60 TABLET | Refills: 0 | Status: SHIPPED | OUTPATIENT
Start: 2022-11-08

## 2022-11-08 NOTE — TELEPHONE ENCOUNTER
Patient would like left  foot xray says she wants her toes xray because she gets pain in them that goes into her ankle   And both knees  Patient wants them because they hurt  Says shes been having pain for a month     Can I order?

## 2022-11-09 ENCOUNTER — TELEPHONE (OUTPATIENT)
Dept: PAIN MEDICINE | Facility: CLINIC | Age: 62
End: 2022-11-09

## 2022-11-09 ENCOUNTER — HOSPITAL ENCOUNTER (OUTPATIENT)
Dept: RADIOLOGY | Facility: HOSPITAL | Age: 62
Discharge: HOME | End: 2022-11-09
Attending: INTERNAL MEDICINE

## 2022-11-09 DIAGNOSIS — M25.561 CHRONIC PAIN OF BOTH KNEES: ICD-10-CM

## 2022-11-09 DIAGNOSIS — M25.562 CHRONIC PAIN OF BOTH KNEES: ICD-10-CM

## 2022-11-09 DIAGNOSIS — F31.9 BIPOLAR 1 DISORDER (HCC): ICD-10-CM

## 2022-11-09 DIAGNOSIS — G89.29 CHRONIC PAIN OF BOTH KNEES: ICD-10-CM

## 2022-11-09 DIAGNOSIS — M79.672 LEFT FOOT PAIN: ICD-10-CM

## 2022-11-09 DIAGNOSIS — K59.03 DRUG-INDUCED CONSTIPATION: ICD-10-CM

## 2022-11-09 DIAGNOSIS — M25.572 CHRONIC PAIN OF LEFT ANKLE: ICD-10-CM

## 2022-11-09 DIAGNOSIS — G89.29 CHRONIC PAIN OF LEFT ANKLE: ICD-10-CM

## 2022-11-09 NOTE — TELEPHONE ENCOUNTER
ALFREDO from [facility] requesting that 90 day supply for all doses of Seroquel and Lamictal be sent to [facility]  Will refer to Dr Karine Arriaga for review

## 2022-11-10 RX ORDER — QUETIAPINE FUMARATE 300 MG/1
300 TABLET, FILM COATED ORAL DAILY
Qty: 30 TABLET | Refills: 0 | Status: SHIPPED | OUTPATIENT
Start: 2022-11-10 | End: 2022-11-14 | Stop reason: SDUPTHER

## 2022-11-10 RX ORDER — LAMOTRIGINE 200 MG/1
200 TABLET ORAL 2 TIMES DAILY
Qty: 60 TABLET | Refills: 0 | Status: SHIPPED | OUTPATIENT
Start: 2022-11-10 | End: 2022-11-14 | Stop reason: SDUPTHER

## 2022-11-10 RX ORDER — QUETIAPINE FUMARATE 100 MG/1
100 TABLET, FILM COATED ORAL
Qty: 30 TABLET | Refills: 0 | Status: SHIPPED | OUTPATIENT
Start: 2022-11-10 | End: 2022-11-14 | Stop reason: SDUPTHER

## 2022-11-10 RX ORDER — QUETIAPINE FUMARATE 400 MG/1
400 TABLET, FILM COATED ORAL
Qty: 30 TABLET | Refills: 0 | Status: SHIPPED | OUTPATIENT
Start: 2022-11-10 | End: 2022-11-14 | Stop reason: SDUPTHER

## 2022-11-11 ENCOUNTER — TELEPHONE (OUTPATIENT)
Dept: PSYCHIATRY | Facility: CLINIC | Age: 62
End: 2022-11-11

## 2022-11-14 ENCOUNTER — APPOINTMENT (OUTPATIENT)
Dept: PHYSICAL THERAPY | Facility: CLINIC | Age: 62
End: 2022-11-14

## 2022-11-14 ENCOUNTER — APPOINTMENT (OUTPATIENT)
Dept: OCCUPATIONAL THERAPY | Facility: CLINIC | Age: 62
End: 2022-11-14

## 2022-11-14 DIAGNOSIS — K59.03 DRUG-INDUCED CONSTIPATION: ICD-10-CM

## 2022-11-14 DIAGNOSIS — F31.9 BIPOLAR 1 DISORDER (HCC): ICD-10-CM

## 2022-11-14 RX ORDER — LAMOTRIGINE 200 MG/1
200 TABLET ORAL 2 TIMES DAILY
Qty: 60 TABLET | Refills: 0 | Status: SHIPPED | OUTPATIENT
Start: 2022-11-14 | End: 2022-11-16 | Stop reason: SDUPTHER

## 2022-11-14 RX ORDER — QUETIAPINE FUMARATE 300 MG/1
300 TABLET, FILM COATED ORAL DAILY
Qty: 30 TABLET | Refills: 0 | Status: SHIPPED | OUTPATIENT
Start: 2022-11-14 | End: 2022-11-16 | Stop reason: SDUPTHER

## 2022-11-14 RX ORDER — QUETIAPINE FUMARATE 400 MG/1
400 TABLET, FILM COATED ORAL
Qty: 30 TABLET | Refills: 0 | Status: SHIPPED | OUTPATIENT
Start: 2022-11-14 | End: 2022-11-16 | Stop reason: SDUPTHER

## 2022-11-14 RX ORDER — QUETIAPINE FUMARATE 100 MG/1
100 TABLET, FILM COATED ORAL
Qty: 30 TABLET | Refills: 0 | Status: SHIPPED | OUTPATIENT
Start: 2022-11-14 | End: 2022-11-16 | Stop reason: SDUPTHER

## 2022-11-14 NOTE — TELEPHONE ENCOUNTER
Scripts need to be sent to [facility]    Will refer to Dr Maria Eugenia Turner for review

## 2022-11-15 ENCOUNTER — TELEPHONE (OUTPATIENT)
Dept: FAMILY MEDICINE CLINIC | Facility: HOSPITAL | Age: 62
End: 2022-11-15

## 2022-11-15 NOTE — TELEPHONE ENCOUNTER
Have patient see Ortho about knee issues-I know she is following with Dr Timmons Earing regarding a recent fracture

## 2022-11-16 ENCOUNTER — TELEMEDICINE (OUTPATIENT)
Dept: PSYCHIATRY | Facility: CLINIC | Age: 62
End: 2022-11-16

## 2022-11-16 ENCOUNTER — TELEPHONE (OUTPATIENT)
Dept: NEPHROLOGY | Facility: CLINIC | Age: 62
End: 2022-11-16

## 2022-11-16 DIAGNOSIS — F42.9 OBSESSIVE-COMPULSIVE DISORDER, UNSPECIFIED TYPE: ICD-10-CM

## 2022-11-16 DIAGNOSIS — K59.03 DRUG-INDUCED CONSTIPATION: ICD-10-CM

## 2022-11-16 DIAGNOSIS — F31.9 BIPOLAR 1 DISORDER (HCC): Primary | ICD-10-CM

## 2022-11-16 RX ORDER — QUETIAPINE FUMARATE 400 MG/1
400 TABLET, FILM COATED ORAL
Qty: 90 TABLET | Refills: 0 | Status: SHIPPED | OUTPATIENT
Start: 2022-11-16

## 2022-11-16 RX ORDER — QUETIAPINE FUMARATE 100 MG/1
100 TABLET, FILM COATED ORAL
Qty: 90 TABLET | Refills: 0 | Status: SHIPPED | OUTPATIENT
Start: 2022-11-16

## 2022-11-16 RX ORDER — FLUVOXAMINE MALEATE 100 MG
TABLET ORAL
Qty: 270 TABLET | Refills: 0 | Status: SHIPPED | OUTPATIENT
Start: 2022-11-16

## 2022-11-16 RX ORDER — QUETIAPINE FUMARATE 300 MG/1
300 TABLET, FILM COATED ORAL DAILY
Qty: 90 TABLET | Refills: 0 | Status: SHIPPED | OUTPATIENT
Start: 2022-11-16

## 2022-11-16 RX ORDER — LAMOTRIGINE 200 MG/1
200 TABLET ORAL 2 TIMES DAILY
Qty: 180 TABLET | Refills: 0 | Status: SHIPPED | OUTPATIENT
Start: 2022-11-16

## 2022-11-16 NOTE — TELEPHONE ENCOUNTER
Patient called and left message on voicemail asking if she needs to get bloodwork done before her appointment in December  I called patient back and left message advised of appointment day, date and time and that bloodwork does need to be done  Advised to call if there were any questions

## 2022-11-16 NOTE — PSYCH
Virtual Regular Visit    Verification of patient location:    Patient is located in the following state in which I hold an active license [place]      Assessment/Plan:    Problem List Items Addressed This Visit        Digestive    Drug-induced constipation    Relevant Medications    lamoTRIgine (LaMICtal) 200 MG tablet       Other    Obsessive compulsive disorder    Relevant Medications    QUEtiapine (SEROquel) 400 MG tablet    QUEtiapine (SEROquel) 300 mg tablet    QUEtiapine (SEROquel) 100 mg tablet    fluvoxaMINE (LUVOX) 100 mg tablet   Other Visit Diagnoses     Bipolar 1 disorder (HCC)    -  Primary    Relevant Medications    QUEtiapine (SEROquel) 400 MG tablet    QUEtiapine (SEROquel) 300 mg tablet    QUEtiapine (SEROquel) 100 mg tablet    fluvoxaMINE (LUVOX) 100 mg tablet          Goals addressed in session: Goal 1          Reason for visit is   Chief Complaint   Patient presents with   • Medication Management        Encounter provider May Garcia MD    Provider located at [address] [facility]  [address]  [phone]      Recent Visits  Date Type Provider Dept   11/15/22 Telephone Sisi Han [facility] [address] [facility]   Showing recent visits within past 7 days and meeting all other requirements  Today's Visits  Date Type Provider Dept   11/16/22 Telemedicine May Garcia  [address] today's visits and meeting all other requirements  Future Appointments  No visits were found meeting these conditions  Showing future appointments within next 150 days and meeting all other requirements       The patient was identified by name and date of birth  Rogeliosaumya Gordillo was informed that this is a telemedicine visit and that the visit is being conducted throughTelephone  My office door was closed  No one else was in the room    She acknowledged consent and understanding of privacy and security of the video platform  The patient has agreed to participate and understands they can discontinue the visit at any time  Pt was not able to connect on amwell  Patient is aware this is a billable service  Subjective  Kerwin Colvin is a 63 y o  female with mood swings and anxiety presents for f/u     compliant with meds, denies SE  Nephrology f/u next month for possible kidney transplant; not on HD yet  Pt lives with sister      HPI   Mood - reports feeling good, denies depression or mood swings   Does ADLs; functions close to baseline  Anxiety - worries and feeling " panicky" sometimes    Past Medical History:   Diagnosis Date   • Anxiety    • Anxiety disorder    • At risk for falls    • Bipolar 2 disorder (HCC)    • Chronic back pain    • Chronic kidney disease    • Closed fracture of distal end of right fibula with routine healing 2020   • COVID-19     in 2021   • CVA (cerebral vascular accident) [facility])     noted on MRI in the past   • Depression    • GERD (gastroesophageal reflux disease)    • Hypercholesteremia    • Hypernatremia    • Hypertension    • Hypokalemia    • Idiopathic chronic pancreatitis ([facility] [address] ) 2018   • Intervertebral disc disorder with radiculopathy of lumbosacral region     resolved: 2015   • Kidney disease    • Limb alert care status     LUE-fistula   • Panic attacks    • Pericardial effusion    • PONV (postoperative nausea and vomiting)    • Radiculitis     resolved: 2015   • Secondary renal hyperparathyroidism ([facility] [address] )    • Vitamin D deficiency        Past Surgical History:   Procedure Laterality Date   • BUNIONECTOMY      Left foot    • CAST APPLICATION Right 8211    Procedure: Application short-arm thumb spica splint;  Surgeon: Suman Nguyen MD;  Location:  MAIN OR;  Service: Orthopedics   • COLON SURGERY     • COLONOSCOPY  2021   • DILATION AND CURETTAGE OF UTERUS     • INDUCED       surgically induced   • WY ANASTOMOSIS,AV,ANY SITE Left 11/18/2019    Procedure: CREATION FISTULA ARTERIOVENOUS (AV) left wrists possible left upper;  Surgeon: Buddy Shane MD;  Location: [facility];  Service: Vascular   • Piroska U  97  W/SESMDC W/DIST Ples Katia Left 7/1/2019    Procedure: Willem Wells;  Surgeon: Gerald Pradhan DPM;  Location: [facility];  Service: Podiatry   • Piroska U  97  W/SESMDC W/DIST Ples Katia Right 8/3/2020    Procedure: Milton Santacruz;  Surgeon: Gerald Pradhan DPM;  Location: [facility];  Service: Podiatry   • VT CORRJ HALLUX VALGUS W/SESMDC W/PROX PHLNX OSTEOT Right 9/27/2021    Procedure: Dorla Kimberly, right tameka osteotomy and 2nd claw toe correction;  Surgeon: Lachelle Demarco MD;  Location: [facility];  Service: Orthopedics   • VT ERCP DX COLLECTION SPECIMEN BRUSHING/WASHING N/A 4/11/2018    Procedure: ENDOSCOPIC RETROGRADE CHOLANGIOPANCREATOGRAPHY (ERCP); Surgeon: Tisha Ac MD;  Location[facility];  Service: Gastroenterology   • VT FINGER TENDON TRANSFER,2-5 FINGRS Right 6/23/2022    Procedure: Right ring finger flexor digitorum superficialis to flexor pollicis longus tendon transfer;  Surgeon: Kellie Lincoln MD;  [facility];  Service: Orthopedics   • VT LAP, SURG PROCTOPEXY N/A 7/13/2018    Procedure: ROBOTIC SIGMOID RESECTION / RECTOPEXY;  Surgeon: Julieta Perez MD;  Location: [facility];  Service: Colorectal   • VT OPEN TREATMENT RADIAL SHAFT FRACTURE Right 10/11/2021    Procedure: OPEN REDUCTION W/ INTERNAL FIXATION (ORIF) RADIUS (WRIST), RIGHT DISTAL;  Surgeon: Ted Lyle MD;  Location: [facility];  Service: Orthopedics   • VT REMOVAL DEEP IMPLANT Right 6/23/2022    Procedure: Removal of hardware volar aspect right distal radius (distal radial plate and screws);   Surgeon: Kellie Lincoln MD;  [facility]: [facility];  Service: Orthopedics   • VT SIGMOIDOSCOPY FLX DX W/COLLJ SPEC BR/WA IF PFRMD N/A 7/13/2018    Procedure: Isiah Garcia;  Surgeon: Sharon Griffith Melina Jimenez MD;  Location: [facility];  Service: Colorectal   • TUBAL LIGATION Bilateral 1997   • US GUIDED THYROID BIOPSY  7/30/2019       Current Outpatient Medications   Medication Sig Dispense Refill   • fluvoxaMINE (LUVOX) 100 mg tablet 1 tab am , 2 tabs  tablet 0   • lamoTRIgine (LaMICtal) 200 MG tablet Take 1 tablet (200 mg total) by mouth 2 (two) times a day 180 tablet 0   • QUEtiapine (SEROquel) 100 mg tablet Take 1 tablet (100 mg total) by mouth daily at bedtime 100mg with 400mg bedtime 90 tablet 0   • QUEtiapine (SEROquel) 300 mg tablet Take 1 tablet (300 mg total) by mouth in the morning 300mg in morning 90 tablet 0   • QUEtiapine (SEROquel) 400 MG tablet Take 1 tablet (400 mg total) by mouth daily at bedtime 90 tablet 0   • acetaminophen (TYLENOL) 650 mg CR tablet Take 1 tablet (650 mg total) by mouth every 8 (eight) hours as needed for mild pain 30 tablet 0   • albuterol (Ventolin HFA) 90 mcg/act inhaler Inhale 2 puffs every 6 (six) hours as needed for wheezing or shortness of breath 8 5 g 3   • AMILoride 5 mg tablet Take 1 tablet (5 mg total) by mouth 2 (two) times a day 180 tablet 3   • aspirin 81 mg chewable tablet Chew 1 tablet (81 mg total) daily     • carvedilol (COREG) 25 mg tablet Take 1 tablet (25 mg total) by mouth 2 (two) times a day with meals 180 tablet 3   • cholecalciferol (VITAMIN D3) 1,000 units tablet Take 5 tablets (5,000 Units total) by mouth daily 90 tablet 5   • clonazePAM (KlonoPIN) 0 5 mg tablet Take 1 tablet (0 5 mg total) by mouth 3 (three) times a day 270 tablet 0   • fluvoxaMINE (LUVOX) 100 mg tablet Take 1 tab in the morning, 2 tab at night 90 tablet 0   • linaCLOtide (Linzess) 290 MCG CAPS Take 1 capsule by mouth daily 90 capsule 0   • naloxone (NARCAN) 4 mg/0 1 mL nasal spray Administer 1 spray into a nostril  If no response after 2-3 minutes, give another dose in the other nostril using a new spray   1 each 1   • omeprazole (PriLOSEC) 40 MG capsule Take 1 capsule (40 mg total) by mouth daily before breakfast 90 capsule 3   • ondansetron (ZOFRAN) 4 mg tablet Take 1 tablet (4 mg total) by mouth every 8 (eight) hours as needed for nausea or vomiting 60 tablet 1   • Polyethylene Glycol 3350 (MIRALAX PO) Take by mouth as needed      • rosuvastatin (CRESTOR) 20 MG tablet Take 1 tablet (20 mg total) by mouth every evening 90 tablet 3   • traMADol (Ultram) 50 mg tablet Take 1 tablet (50 mg total) by mouth every 8 (eight) hours as needed for severe pain 60 tablet 0     No current facility-administered medications for this visit  Allergies   Allergen Reactions   • Pollen Extract Nasal Congestion       Review of Systems   Constitutional: Negative for activity change and appetite change  Psychiatric/Behavioral: Positive for sleep disturbance (chronic poor sleep)  Negative for suicidal ideas  Video Exam    There were no vitals filed for this visit  Physical Exam  Neurological:      Mental Status: She is alert  Psychiatric:         Attention and Perception: Perception normal          Mood and Affect: Mood normal          Speech: Speech normal          Behavior: Behavior is cooperative  Thought Content:  Thought content normal          Judgment: Judgment normal       Comments: Assessment is limited due to phone interview          Visit Time    Visit Start Time: 9 21 am   Visit Stop Time: 9 31 am   Total Visit Duration: 18 minutes

## 2022-11-17 ENCOUNTER — TELEPHONE (OUTPATIENT)
Dept: PSYCHIATRY | Facility: CLINIC | Age: 62
End: 2022-11-17

## 2022-11-17 NOTE — TELEPHONE ENCOUNTER
Transfer request from Dr Trisha Lawrence for patient to be seen for in person apts  Clt rescheduled to see Dr Og Campbell

## 2022-11-18 ENCOUNTER — TELEPHONE (OUTPATIENT)
Dept: PSYCHIATRY | Facility: CLINIC | Age: 62
End: 2022-11-18

## 2022-11-18 NOTE — TELEPHONE ENCOUNTER
Client calling to request 60 day supply of medications  Dr Linh Franz sent in 90 day supply on 11/16  Call to client to inform  Client reports that [facility] requires another 60 days  Advised client to discuss at upcoming appointment with Dr Jasmina Dc on 12/8 as client reports she has enough medication until that time

## 2022-11-21 ENCOUNTER — TELEPHONE (OUTPATIENT)
Dept: NEPHROLOGY | Facility: CLINIC | Age: 62
End: 2022-11-21

## 2022-11-28 ENCOUNTER — OFFICE VISIT (OUTPATIENT)
Dept: OCCUPATIONAL THERAPY | Facility: CLINIC | Age: 62
End: 2022-11-28

## 2022-11-28 ENCOUNTER — CONSULT (OUTPATIENT)
Dept: PAIN MEDICINE | Facility: CLINIC | Age: 62
End: 2022-11-28

## 2022-11-28 ENCOUNTER — EVALUATION (OUTPATIENT)
Dept: PHYSICAL THERAPY | Facility: CLINIC | Age: 62
End: 2022-11-28

## 2022-11-28 VITALS
HEART RATE: 82 BPM | SYSTOLIC BLOOD PRESSURE: 168 MMHG | WEIGHT: 227 LBS | DIASTOLIC BLOOD PRESSURE: 98 MMHG | BODY MASS INDEX: 35.63 KG/M2 | HEIGHT: 67 IN | TEMPERATURE: 98.1 F

## 2022-11-28 DIAGNOSIS — M79.604 LEG PAIN, BILATERAL: ICD-10-CM

## 2022-11-28 DIAGNOSIS — M54.42 CHRONIC BILATERAL LOW BACK PAIN WITH BILATERAL SCIATICA: Primary | ICD-10-CM

## 2022-11-28 DIAGNOSIS — M79.605 LEG PAIN, BILATERAL: ICD-10-CM

## 2022-11-28 DIAGNOSIS — M54.16 LUMBAR RADICULOPATHY: Primary | ICD-10-CM

## 2022-11-28 DIAGNOSIS — S52.531D CLOSED COLLES' FRACTURE OF RIGHT RADIUS WITH ROUTINE HEALING, SUBSEQUENT ENCOUNTER: ICD-10-CM

## 2022-11-28 DIAGNOSIS — M54.41 CHRONIC BILATERAL LOW BACK PAIN WITH BILATERAL SCIATICA: Primary | ICD-10-CM

## 2022-11-28 DIAGNOSIS — Z47.89 AFTERCARE FOLLOWING SURGERY OF THE MUSCULOSKELETAL SYSTEM: Primary | ICD-10-CM

## 2022-11-28 DIAGNOSIS — R29.898 WEAKNESS OF BOTH LOWER EXTREMITIES: ICD-10-CM

## 2022-11-28 DIAGNOSIS — G89.29 CHRONIC BILATERAL LOW BACK PAIN WITH BILATERAL SCIATICA: Primary | ICD-10-CM

## 2022-11-28 RX ORDER — METHYLPREDNISOLONE 4 MG/1
TABLET ORAL
Qty: 21 TABLET | Refills: 0 | Status: SHIPPED | OUTPATIENT
Start: 2022-11-28 | End: 2022-12-07 | Stop reason: ALTCHOICE

## 2022-11-28 NOTE — PROGRESS NOTES
Daily Note     Today's date: 2022  Patient name: Zaida Last  : 1960  MRN: [id number]  Referring provider: Samantha Mckeon MD  Dx:   Encounter Diagnosis     ICD-10-CM    1  Aftercare following surgery of the musculoskeletal system  [id number] [id number]       2  Closed Colles' fracture of right radius with routine healing, subsequent encounter  [id number]                      Subjective: No new complaints     Objective: See treatment diary below      Assessment: Tolerated treatment well  Patient reports weakness with her hand  She has good ROM of the wrist   Will continue with tx due to pt  Missing a few sessions due to illness  Goals  STG( 6 visits)  1  Compliant with HEP/splint - met  2  Decrease pain to less than 6/10 with function- met  3   R wrist ROM improved by 10 degrees for function- met  LTG( 12 visits or discharge)  1  Pain free R hand function  2  R thumb full opposition for [facility]  3  Improve FOTO score to predicted outcome or greater  4  R /pinch strength WFLs for function  Plan: Continue per plan of care        Precautions: Fall risk      Manuals 10/10 10/17 10/24 11/2 11/7 11/28        IE 30'            Jt  Mob wrist  4m 4m 4m 4m 4m                                 Neuro Re-Ed                                                                                                        Ther Ex             Wrist PROM 2m 2m 2m 2m 2m 2m       Thumb IP flexion blocking 2m 2m 2m 2m 2m 2m       Wrist curls #2 3x10 #3 3x10 #3 3x10 #3 3x10 #3 3x10 #3 3x10       P/s therabar Red 3x10 Red 3x10 Red 3x10 Red 3x10 Red 3x10 Red 3x10       Ball lifts 2 2# 3x10 2 2# 3x10 2 2# 3x10 2 2# 3x10 2 2# 3x10 2 2# 3x10                                              Ther Activity             FM- peg pickup  Key pegs                        Gait Training                                       Modalities             MH R 10m 10m 10m 10m 10m 10m

## 2022-11-28 NOTE — PROGRESS NOTES
Assessment  1  Lumbar radiculopathy    2  Weakness of both lower extremities        Plan      At this point the patient's pain persists despite time, relative rest, activity modification, and nonsteroidal anti-inflammatories  Her pain is significantly interfering with her daily living activities  It is appropriate to order an MRI of the lumbar spine to rule out any significant etiology of her symptoms  I will start her on a titrating dose of methylprednisolone to address any inflammatory component of the patient's pain  She understands she should not take nonsteroidal anti-inflammatories until she is finished with this steroid  She understands there is a chance of decreasing immunity secondary to steroids  If she has any problems or questions she will give us a call  I strongly encouraged Asa Guan to continue to follow through with physical therapy  Once we obtain MRI results, I will follow-up with the patient, review the results and current symptoms, and discuss the next steps of the treatment plan  My impressions and treatment recommendations were discussed in detail with the patient who verbalized understanding and had no further questions  Discharge instructions were provided  I personally saw and examined the patient and I agree with the above discussed plan of care  This note is created using dictation transcription  It may contain typographical errors, grammatical errors, improperly dictated words, background noise and other errors  Orders Placed This Encounter   Procedures   • MRI lumbar spine without contrast     Standing Status:   Future     Standing Expiration Date:   11/28/2026     Scheduling Instructions: There is no preparation for this test  Please leave your jewelry and valuables at home, wedding rings are the exception  All patients will be required to change into a hospital gown and pants  Street clothes are not permitted in the MRI    Magnetic nail polish must be removed prior to arrival for your test  Please bring your insurance cards, a form of photo ID and a list of your medications with you  Arrive 15 minutes prior to your appointment time in order to register  Please bring any prior CT or MRI studies of this area that were not performed at a [facility]  To schedule this appointment, please contact Central Scheduling at [phone]  Prior to your appointment, please make sure you complete the MRI Screening Form when you e-Check in for your appointment  This will be available starting 7 days before your appointment in [address]  You may receive an e-mail with an activation code if you do not have a [facility] account  If you do not have access to a device, we will complete your screening at your appointment  Order Specific Question:   What is the patient's sedation requirement? Answer:   No Sedation     Order Specific Question:   Is the patient pregnant? Answer:   Unknown     Order Specific Question:   Release to patient through [facility]     Answer:   Immediate     Order Specific Question:   Is order priority selected as STAT? Answer:   No     Order Specific Question:   Reason for Exam (FREE TEXT)     Answer:   lumbar radic     New Medications Ordered This Visit   Medications   • methylPREDNISolone 4 MG tablet therapy pack     Sig: Use as directed on package     Dispense:  21 tablet     Refill:  0     Referred By: Natasha Romano DO  History of Present Illness    Shaina Meehan is a 63 y o  female with longstanding history of low back and lower extremity pain  She reports worsening back pain over the past few years as well as weakness of the lower limbs  She is a past medical history of chronic pancreatitis, end-stage renal disease, CVA, and bipolar disorder  She is had multiple falls  Her pain is severe she rates as 8/10 on the visual analog scale completely interfering with daily living activities    Her pain is constant worse in the morning than at night describes burning in her legs cramping and numbness into her legs  She denies loss of bowel or bladder  She reports that standing bending sitting walking all aggravate her symptoms  She is currently undergoing physical therapy  I have personally reviewed and/or updated the patient's past medical history, past surgical history, family history, social history, current medications, allergies, and vital signs today  Review of Systems   Constitutional: Positive for unexpected weight change  Negative for fever  HENT: Negative for trouble swallowing  Eyes: Negative for visual disturbance  Respiratory: Negative for shortness of breath and wheezing  Cardiovascular: Negative for chest pain and palpitations  Gastrointestinal: Negative for constipation, diarrhea, nausea and vomiting  Endocrine: Negative for cold intolerance, heat intolerance and polydipsia  Genitourinary: Negative for difficulty urinating and frequency  Musculoskeletal: Negative for arthralgias, gait problem, joint swelling and myalgias  Skin: Negative for rash  Neurological: Positive for dizziness, numbness and headaches  Negative for seizures, syncope and weakness  Hematological: Does not bruise/bleed easily  Psychiatric/Behavioral: Positive for decreased concentration and dysphoric mood  All other systems reviewed and are negative        Patient Active Problem List   Diagnosis   • Hypercholesteremia   • Spondylolisthesis at L5-S1 level   • Renal cyst   • Vitamin D deficiency   • Secondary renal hyperparathyroidism (HCC)   • Herniated nucleus pulposus, L5-S1   • Idiopathic chronic pancreatitis (HCC)   • Primary osteoarthritis of right knee   • Age related osteoporosis   • Cardiac murmur   • Rectal prolapse   • Elevated LFTs   • Primary hypertension   • Hyperprolactinemia (HCC)   • Bipolar 1 disorder, depressed, severe (HCC)   • Obsessive compulsive disorder   • Panic disorder with agoraphobia • Eating disorder   • Hyperparathyroidism ([facility] )   • Benign neoplasm of colon   • Drug-induced constipation   • Infarction of left basal ganglia ([facility])   • Abnormal chest CT   • Hyperphosphatemia   • Abnormal thyroid exam   • Thyroid nodule   • Moderate persistent asthma without complication   • Primary osteoarthritis of left knee   • Steal syndrome dialysis vascular access [facility])   • AVF (arteriovenous fistula) ([facility])   • Right patellofemoral syndrome   • CKD (chronic kidney disease) stage 4, GFR 15-29 ml/min ([facility])   • Bilateral sacroiliitis ([facility])   • Hallux valgus, right   • Acquired hallux interphalangeus of right foot   • Effusion of right knee   • Left knee tendonitis   • Family history of cardiac disorder in father   • Anxiety   • Claw toe, acquired, right   • Injury of plantar plate, right, initial encounter   • Acquired hallux interphalangeus, right   • Closed Colles' fracture of right radius   • Aftercare following surgery of the musculoskeletal system   • Acute shoulder pain   • Accelerated hypertension   • Nausea   • GERD (gastroesophageal reflux disease)   • End stage renal disease ([facility] )   • Injury of right thumb   • Pain of right upper extremity   • Continuous opioid dependence ([facility])   • Severe depressed bipolar I disorder without psychotic features ([facility] )   • Mixed obsessional thoughts and acts   • Eating disorder, unspecified   • Chronic back pain   • Obesity, morbid ([facility] )       Past Medical History:   Diagnosis Date   • Anxiety    • Anxiety disorder    • Arthritis    • At risk for falls    • Bipolar 2 disorder ([facility])    • Chronic back pain    • Chronic kidney disease    • Closed fracture of distal end of right fibula with routine healing 11/04/2020   • COVID-19     in Jan 2021   • CVA (cerebral vascular accident) [facility])     noted on MRI in the past   • Depression    • GERD (gastroesophageal reflux disease)    • Hypercholesteremia    • Hypernatremia    • Hypertension    • Hypokalemia    • Idiopathic chronic pancreatitis ([facility] ) 2018   • Intervertebral disc disorder with radiculopathy of lumbosacral region     resolved: 2015   • Kidney disease    • Limb alert care status     LUE-fistula   • Panic attacks    • Pericardial effusion    • PONV (postoperative nausea and vomiting)    • Psychiatric problem    • Radiculitis     resolved: 2015   • Secondary renal hyperparathyroidism ([facility] )    • Stroke [facility])    • Vitamin D deficiency        Past Surgical History:   Procedure Laterality Date   • BUNIONECTOMY      Left foot    • CAST APPLICATION Right     Procedure: Application short-arm thumb spica splint;  Surgeon: Nettie Vieyra MD;  Location: [facility];  Service: Orthopedics   • COLON SURGERY     • COLONOSCOPY  2021   • DILATION AND CURETTAGE OF UTERUS     • INDUCED       surgically induced   • SD ANASTOMOSIS,AV,ANY SITE Left 2019    Procedure: CREATION FISTULA ARTERIOVENOUS (AV) left wrists possible left upper;  Surgeon: Fox Villalba MD;  Location: [facility];  Service: Vascular   • SD [place] W/SESMDC W/DIST Kye Aldo Left 2019    Procedure: Rosalea Rabia;  Surgeon: Alison Loza DPM;  Location: [facility];  Service: Podiatry   • SD CORRJ [address] W/SESMDC W/DIST Kye Aldo Right 8/3/2020    Procedure: Palmer Dandridge;  Surgeon: Alison Loza DPM;  Location: [facility];  Service: Podiatry   • SD CORRJ HALLUX VALGUS W/SESMDC W/PROX PHLNX OSTEOT Right 2021    Procedure: Jamse Marta, right tameka osteotomy and 2nd claw toe correction;  Surgeon: Bebo Almanza MD;  Location: [facility];  Service: Orthopedics   • SD ERCP DX COLLECTION SPECIMEN BRUSHING/WASHING N/A 2018    Procedure: ENDOSCOPIC RETROGRADE CHOLANGIOPANCREATOGRAPHY (ERCP);   Surgeon: Kiara Roman MD;  Location: [facility];  Service: Gastroenterology   • SD FINGER TENDON TRANSFER,2-5 FINGRS Right 2022    Procedure: Right ring finger flexor digitorum superficialis to flexor pollicis longus tendon transfer;  Surgeon: Cordell Kern MD;  Location: [facility];  Service: Orthopedics   • MS LAP, SURG PROCTOPEXY N/A 7/13/2018    Procedure: ROBOTIC SIGMOID RESECTION / RECTOPEXY;  Surgeon: Shelbie De Souza MD;  Location: [facility];  Service: Colorectal   • MS OPEN TREATMENT RADIAL SHAFT FRACTURE Right 10/11/2021    Procedure: OPEN REDUCTION W/ INTERNAL FIXATION (ORIF) RADIUS (WRIST), RIGHT DISTAL;  Surgeon: Nathalie Lucas MD;  Location: [facility];  Service: Orthopedics   • MS REMOVAL DEEP IMPLANT Right 6/23/2022    Procedure: Removal of hardware volar aspect right distal radius (distal radial plate and screws);   Surgeon: Cordell Kern MD;  Location: [facility];  Service: Orthopedics   • MS SIGMOIDOSCOPY FLX DX W/COLLJ SPEC BR/WA IF PFRMD N/A 7/13/2018    Procedure: Nery Melendrez;  Surgeon: Shelbie De Souza MD;  [facility];  Service: Colorectal   • TUBAL LIGATION Bilateral 1997   • Donnie [address]34 THYROID BIOPSY  7/30/2019       Family History   Problem Relation Age of Onset   • Bipolar disorder Mother    • Mental illness Mother         depression   • Stroke Mother    • Dementia Mother    • Colon polyps Mother    • Heart disease Father    • Hypertension Father    • Diabetes Father    • Other Family         Back disorder   • Diabetes Family    • Heart disease Family    • Hypertension Family    • Stroke Family    • Thyroid disease Family    • Breast cancer Paternal Grandmother         age unknown   • Breast cancer Paternal Aunt         age unknown   • Breast cancer Maternal Aunt         age unknown   • Mental illness Sister    • Colon polyps Sister    • Mental illness Sister    • Heart disease Sister    • No Known Problems Sister    • Breast cancer Sister 76   • Other Son         pituitary tumor   • Hypertension Son    • Obesity Son    • No Known Problems Son    • No Known Problems Maternal Grandmother    • No Known Problems Maternal Grandfather • No Known Problems Paternal Grandfather    • Breast cancer Paternal Aunt         age unknown   • Substance Abuse Neg Hx         neg fam hx   • Colon cancer Neg Hx        Social History     Occupational History   • 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   Tobacco Use   • Smoking status: Never   • Smokeless tobacco: Never   Vaping Use   • Vaping Use: Never used   Substance and Sexual Activity   • Alcohol use: Never   • Drug use: Not Currently   • Sexual activity: Not Currently       Current Outpatient Medications on File Prior to Visit   Medication Sig   • acetaminophen (TYLENOL) 650 mg CR tablet Take 1 tablet (650 mg total) by mouth every 8 (eight) hours as needed for mild pain   • albuterol (Ventolin HFA) 90 mcg/act inhaler Inhale 2 puffs every 6 (six) hours as needed for wheezing or shortness of breath   • AMILoride 5 mg tablet Take 1 tablet (5 mg total) by mouth 2 (two) times a day   • carvedilol (COREG) 25 mg tablet Take 1 tablet (25 mg total) by mouth 2 (two) times a day with meals   • cholecalciferol (VITAMIN D3) 1,000 units tablet Take 5 tablets (5,000 Units total) by mouth daily   • clonazePAM (KlonoPIN) 0 5 mg tablet Take 1 tablet (0 5 mg total) by mouth 3 (three) times a day   • fluvoxaMINE (LUVOX) 100 mg tablet Take 1 tab in the morning, 2 tab at night   • fluvoxaMINE (LUVOX) 100 mg tablet 1 tab am , 2 tabs HS   • lamoTRIgine (LaMICtal) 200 MG tablet Take 1 tablet (200 mg total) by mouth 2 (two) times a day   • linaCLOtide (Linzess) 290 MCG CAPS Take 1 capsule by mouth daily   • naloxone (NARCAN) 4 mg/0 1 mL nasal spray Administer 1 spray into a nostril  If no response after 2-3 minutes, give another dose in the other nostril using a new spray     • omeprazole (PriLOSEC) 40 MG capsule Take 1 capsule (40 mg total) by mouth daily before breakfast   • ondansetron (ZOFRAN) 4 mg tablet Take 1 tablet (4 mg total) by mouth every 8 (eight) hours as needed for nausea or vomiting   • Polyethylene Glycol 3350 (MIRALAX PO) Take by mouth as needed    • QUEtiapine (SEROquel) 100 mg tablet Take 1 tablet (100 mg total) by mouth daily at bedtime 100mg with 400mg bedtime   • QUEtiapine (SEROquel) 300 mg tablet Take 1 tablet (300 mg total) by mouth in the morning 300mg in morning   • QUEtiapine (SEROquel) 400 MG tablet Take 1 tablet (400 mg total) by mouth daily at bedtime   • rosuvastatin (CRESTOR) 20 MG tablet Take 1 tablet (20 mg total) by mouth every evening   • traMADol (Ultram) 50 mg tablet Take 1 tablet (50 mg total) by mouth every 8 (eight) hours as needed for severe pain   • aspirin 81 mg chewable tablet Chew 1 tablet (81 mg total) daily (Patient not taking: Reported on 11/28/2022)     No current facility-administered medications on file prior to visit  Allergies   Allergen Reactions   • Pollen Extract Nasal Congestion       Physical Exam    /98 (BP Location: Left arm, Patient Position: Sitting, Cuff Size: Standard)   Pulse 82   Temp 98 1 °F (36 7 °C)   Ht 5' 7" (1 702 m)   Wt 103 kg (227 lb)   LMP  (LMP Unknown)   BMI 35 55 kg/m²     Constitutional: normal, well developed, well nourished, alert, in no distress and non-toxic and no overt pain behavior  and obese  Eyes: anicteric  HEENT: grossly intact  Neck: supple, symmetric, trachea midline and no masses   Pulmonary:even and unlabored  Cardiovascular:No edema or pitting edema present  Skin:Normal without rashes or lesions and well hydrated  Psychiatric:Mood and affect appropriate  Neurologic:Cranial Nerves II-XII grossly intact  Musculoskeletal:normal, Difficulty going from sitting to standing sitting position; no obvious skin lesions or erythema lumbar sacral spine; mild tenderness in lumbar paravertebrals, no sacroiliac or greater trochanteric tenderness bilateral; deep tendon reflexes are diminished but symmetrical bilateral patellar and achilles; no focal motor deficit appreciated lower limbs; negative bilateral straight leg raising      Imaging  LUMBAR SPINE @  10-12-22     INDICATION:   M54 42: Lumbago with sciatica, left side  M54 41: Lumbago with sciatica, right side  G89 29: Other chronic pain  R29 898: Other symptoms and signs involving the musculoskeletal system  M79 604: Pain in right leg  M79 605: Pain in left leg        COMPARISON:  None     VIEWS:  XR SPINE LUMBAR 2 OR 3 VIEWS INJURY  Images: 3     FINDINGS:     There are 5 non rib bearing lumbar vertebral bodies       There is no evidence of acute fracture or destructive osseous lesion      Mild scoliotic deformity is noted  Grade 2 anterolisthesis of L5 on S1      Intervertebral disc space narrowing at L5-S1 with endplate osteophytes present      The pedicles appear intact       Atherosclerotic calcifications      IMPRESSION:     Mild scoliotic curvature of lumbar spine with grade 2 anterolisthesis of L5 on S1  MRI LUMBAR SPINE WITHOUT CONTRAST @  10-23-15     INDICATION-  Fell down 12 stairs      COMPARISON-  X-ray 9/19/2015      TECHNIQUE-  Sagittal T1, sagittal T2, sagittal inversion recovery,   axial T1 and axial T2, coronal T2         IMAGE QUALITY-  Diagnostic      FINDINGS-      ALIGNMENT-  Bilateral L5 spondylolysis, grade 1 spondylolisthesis  Loss of disc height and none review thinning of the disc, extending   into both foramen  MARROW SIGNAL-  Normal marrow signal is identified within the   visualized bony structures  No discrete marrow lesion  DISTAL CORD AND CONUS-  Normal size and signal within the distal cord   and conus  The conus ends at the L1 level  PARASPINAL SOFT TISSUES-  Paraspinal soft tissues are unremarkable  SACRUM-  Normal signal within the sacrum  No evidence of insufficiency   or stress fracture  LOWER THORACIC DISC SPACES-  Normal disc height and signal   No disc   herniation, canal stenosis or foraminal narrowing        LUMBAR DISC SPACES-           L1-L2-  Normal       L2-L3-  Normal       L3-L4-  Normal       L4-L5-  Normal       L5-S1- Chronic endplate changes, loss of disc height, grade 1   anterolisthesis with chronic bilateral L5 spondylolysis  Disc extends   into both foramen, minor elevation of the L5 root, no definite L5 root   compression  No indication of recent fracture  IMPRESSION-      Chronic changes at the L5-S1 level  No definite root compression  No   evidence for acute injury  I have personally reviewed pertinent films in PACS and my interpretation is Low lumbar spondylosis with spondylolysis

## 2022-11-28 NOTE — PROGRESS NOTES
PT Re-Evaluation     Today's date: 2022  Patient name: Lori Bey  : 1960  MRN: [id number]  Referring provider: Merlinda Ko, DO  Dx:   Encounter Diagnosis     ICD-10-CM    1  Chronic bilateral low back pain with bilateral sciatica  M54 42     M54 41     G89 29       2  Leg pain, bilateral  M79 604     M79 605       3  Weakness of both lower extremities  R29 [id number]                      Assessment  Assessment details: Lori Bey is a 63 y o  female who presents with increased low back pain and LE weakness consistent with referring diagnosis of Chronic bilateral low back pain with bilateral sciatica  (primary encounter diagnosis)  Weakness of both lower extremities  Leg pain, bilateral that is complex secondary to chronic onset, moderate fear avoidance and high pain levels with highly complex PMH  Clinically demonstrates decreased lumbar ROM, decreased core and hip strength, decreased LE and core/posture proprioception leading to pain with ADLs and exercise  This suggests the need for skilled OPPT to address the above listed impairments, achieve established goals and return to PLOF pain-free  If you have any questions or concerns please contact me at [phone]  Thank you! Re-assessment 22:  Deyanira Adhikari has attended 4 visits since the initiation of OPPT and reports a 0% GROC  Clinically demonstrates little change objectively in hip strength and lumbar flexibility- this is secondary to a break in care and recent fall at home  She continues to have limited hip strength and balance as well as high fear avoidance of activity  This suggests the need for continued skilled OPPT to address her remaining impairments, achieve established goals and return to PLOF pain-free     Impairments: abnormal coordination, abnormal muscle firing, abnormal muscle tone, abnormal or restricted ROM, activity intolerance, impaired balance, impaired physical strength, lacks appropriate home exercise program, pain with function, weight-bearing intolerance, poor posture  and poor body mechanics    Symptom irritability: highUnderstanding of Dx/Px/POC: fair   Prognosis: fair    Goals  Short Term Goals (4 weeks)  1 ) Establish independence with HEP-  MET  2 ) Decrease subjective pain levels from NPRS at least to 2-5/10 at rest and with activity- partailly met  3 ) Improve L/S ROM at least 5-10 degrees into all planes to allow for improved ease of movement with less guarding- partially met    Long Term Goals (8 weeks)  1 ) Improve L/S ROM to WNL in all planes to restore normal movement with ADLs and function- partially met  2 ) Improve hip ABD and ER strength to 5/5 in all planes in order to return to pain-free ADLs and function- partially met  3 ) Improve FOTO score at least to 75 points showing improved self reported disability - partially met    Plan  Plan details: Continue POC for L/S ROM, strength and stability; monitor sxxs and progress as able  Patient would benefit from: skilled physical therapy and PT eval  Planned modality interventions: biofeedback, cryotherapy, electrical stimulation/Russian stimulation and TENS  Planned therapy interventions: abdominal trunk stabilization, activity modification, ADL retraining, ADL training, balance, balance/weight bearing training, behavior modification, IADL retraining, joint mobilization, manual therapy, motor coordination training, neuromuscular re-education, patient education, postural training, self care, sensory integrative techniques, strengthening, stretching, therapeutic activities, therapeutic exercise, therapeutic training, transfer training, home exercise program, graded exercise, graded activity, graded motor, gait training, functional ROM exercises, flexibility, coordination and body mechanics training  Frequency: 2x week  Duration in visits: 16  Duration in weeks: 8  Plan of Care beginning date: 11/28/2022  Plan of Care expiration date: 2023  Treatment plan discussed with: patient        Subjective Evaluation    History of Present Illness  Date of onset: 2022  Mechanism of injury: Jan Lim is a 63 y o  female who presents with increased LBP and LE pain/weakness and sciatica starting ~ 6 weeks ago with [facility]  Notes she had prior difficulty with sciatica years ago with bending forwards and getting back up  Notes that this pain currently is more painful and B/L LEs are rubbery and having discomfort with performance and increased L foot pain "feels like I broke it, but I don't know what I did to it"  Notes having most L foot pain with walking and improved with sandals  Reports having most pain with walking, getting up and standing  Decided to seek out MD consult where X-rays were (-) for fx and script for OPPT provided  No referral for pain management yet  Reports that her knees have a rubbery feeling  Denies night pain or changes in bowel or bladder function- just difficulty with getting off the toilet  Denies any NT signs or sxs  F/U with MD in 1 month  Wishes to return to PLOF pain-free  Re-assessment 22:  Orion Beck has attended 4 visits since the initiation of OPPT and reports a 0% GROC  Notes having minimal progress due to covid and falling  Reports falling last Friday leading to increased B/L knee pain and having difficulty with getting up from the couch  Notes having increased crunching and creaking at times  Notes she fell and couldn't get up without crawling  Notes having pain with bending her knees  Notes with vaccuming and standing getting B/L LE pain and low back discomfort  Has not been compliant with core stability program   She still has trouble with standing and doing dishes and needs to hunch forward to perform  Improved pain only with sitting              Recurrent probem    Quality of life: fair    Pain  Current pain ratin  At best pain ratin  At worst pain rating: 10  Location: L/S and LEs  Quality: throbbing, dull ache and sharp  Relieving factors: relaxation, rest, support, medications and change in position  Aggravating factors: walking, standing and stair climbing  Progression: worsening    Social Support  Steps to enter house: yes  3  26  Lives in: condominium  Lives with: sister      Employment status: not working  Exercise history: No would like to walk       Diagnostic Tests  X-ray: abnormal  Treatments  Previous treatment: physical therapy  Current treatment: physical therapy  Patient Goals  Patient goals for therapy: decreased edema, decreased pain, improved balance, increased motion, increased strength, independence with ADLs/IADLs and return to sport/leisure activities  Patient goal: Stand up straight; get my legs feeling better and get up with no problem         Objective     Neurological Testing     Sensation     Lumbar   Left   Intact: light touch  Hypersensation: light touch    Comments   Left light touch: L3-L5 hyper    Reflexes   Left   Patellar (L4): brisk (3+)  Achilles (S1): normal (2+)    Right   Patellar (L4): brisk (3+)  Achilles (S1): normal (2+)    Active Range of Motion     Lumbar   Flexion: 40 degrees   Extension: 5 degrees   Left lateral flexion: 35 degrees       Right lateral flexion: 40 degrees   Left rotation: 65 degrees   Right rotation: 75 degrees   Left Hip   External rotation (90/90): 27 degrees   Internal rotation (90/90): 27 degrees     Right Hip   External rotation (90/90): 32 degrees   Internal rotation (90/90): 33 degrees     Strength/Myotome Testing     Lumbar   Left   Heel walk: normal  Toe walk: normal    Right   Heel walk: normal  Toe walk: normal    Left Hip   Planes of Motion   Flexion: 4+  Extension: 3+  Abduction: 3-  Adduction: 4+  External rotation: 4  Internal rotation: 4-    Right Hip   Planes of Motion   Flexion: 4-  Extension: 4-  Abduction: 4+  Adduction: 4+  External rotation: 5  Internal rotation: 5    Left Knee   Flexion: 4+  Extension: 4+    Right Knee   Flexion: 5  Extension: 5    Left Ankle/Foot   Dorsiflexion: 4-    Right Ankle/Foot   Dorsiflexion: 5    Tests     Lumbar     Left   Positive quadrant  Negative slump test      Right   Negative quadrant and slump test      Functional Assessment      Squat    Left within functional limits, right within functional limits, left varus and right varus  Single Leg Stance   Left: 2 seconds  Right: 1 seconds             Precautions: Pancreatitis; parathyroidisim; HTN; spondylolisthesis; chronic stage 4 kidney disease; OCD; Bipolar; Panic disorder; Anxiety; agorophobia  EPOC: 12/19/22  HEP: Access Code: [email or web address]  URL: [email or web address]  Date: 10/17/2022  Prepared by: Jeyson Topete    Exercises  · Supine Posterior Pelvic Tilt - 1 x daily - 7 x weekly - 3 sets - 10 reps  · Supine 90/90 Alternating Heel Touches with Posterior Pelvic Tilt - 1 x daily - 7 x weekly - 3 sets - 10 reps  · Standing Posterior Pelvic Tilt - 1 x daily - 7 x weekly - 3 sets - 10 reps  · Standing Pelvic Tilts with [facility] With Pelvic Floor Contraction - 1 x daily - 7 x weekly - 3 sets - 10 reps          Manuals 10/17 10/24 11/2 11/7 11/28        LE PROM  WE CM WE PF        STM to L/S  WE CM WE PF        Re-assessment      PF                     Neuro Re-Ed   11/2          PPT 10x supine x10 3"x10 5"x10 STD 5"x10        STD PPT with march 10x STD x10 x10 x10 ea 20x        Supine NSP marching 10x ea x10 3"x10 ea  3"x10 3"x10        Supine NSP heel slides    x10 ea NV        Marching with mirror feedback and PPT     20x        TM walk with PPT     5 min         Std star excursion      10x ea        Ther Ex   11/2          SLR x 3  x10 ea x10 ea    x10 ea         Clamshells    x10 ea         Bridges    x10         Pball Lumbar Flx stretch    x20         Seated HS stretch    5"x10 ea                                                Ther Activity   11/2          Bike   patient refused          TM walking             Gait Training                                       Modalities

## 2022-11-29 ENCOUNTER — TELEPHONE (OUTPATIENT)
Dept: BEHAVIORAL/MENTAL HEALTH CLINIC | Facility: CLINIC | Age: 62
End: 2022-11-29

## 2022-11-30 ENCOUNTER — TELEPHONE (OUTPATIENT)
Dept: NEPHROLOGY | Facility: HOSPITAL | Age: 62
End: 2022-11-30

## 2022-11-30 NOTE — TELEPHONE ENCOUNTER
Called and spoke with Patient to complete their bloodwork prior to their appointment on 12/7 with Dr Morales Geiger at the [facility] location

## 2022-12-01 ENCOUNTER — APPOINTMENT (OUTPATIENT)
Dept: LAB | Facility: HOSPITAL | Age: 62
End: 2022-12-01
Attending: INTERNAL MEDICINE

## 2022-12-01 DIAGNOSIS — N18.4 CKD (CHRONIC KIDNEY DISEASE) STAGE 4, GFR 15-29 ML/MIN (HCC): ICD-10-CM

## 2022-12-01 LAB
ALBUMIN SERPL BCP-MCNC: 3.6 G/DL (ref 3.5–5)
ALP SERPL-CCNC: 179 U/L (ref 46–116)
ALT SERPL W P-5'-P-CCNC: 39 U/L (ref 12–78)
ANION GAP SERPL CALCULATED.3IONS-SCNC: 6 MMOL/L (ref 4–13)
AST SERPL W P-5'-P-CCNC: 28 U/L (ref 5–45)
BACTERIA UR QL AUTO: ABNORMAL /HPF
BILIRUB SERPL-MCNC: 0.29 MG/DL (ref 0.2–1)
BILIRUB UR QL STRIP: NEGATIVE
BUN SERPL-MCNC: 35 MG/DL (ref 5–25)
CALCIUM SERPL-MCNC: 10.2 MG/DL (ref 8.3–10.1)
CHLORIDE SERPL-SCNC: 110 MMOL/L (ref 96–108)
CLARITY UR: CLEAR
CO2 SERPL-SCNC: 24 MMOL/L (ref 21–32)
COLOR UR: COLORLESS
CREAT SERPL-MCNC: 2.65 MG/DL (ref 0.6–1.3)
CREAT UR-MCNC: 31.9 MG/DL
ERYTHROCYTE [DISTWIDTH] IN BLOOD BY AUTOMATED COUNT: 12.9 % (ref 11.6–15.1)
GFR SERPL CREATININE-BSD FRML MDRD: 18 ML/MIN/1.73SQ M
GLUCOSE P FAST SERPL-MCNC: 125 MG/DL (ref 65–99)
GLUCOSE UR STRIP-MCNC: NEGATIVE MG/DL
HCT VFR BLD AUTO: 40.1 % (ref 34.8–46.1)
HGB BLD-MCNC: 11.8 G/DL (ref 11.5–15.4)
HGB UR QL STRIP.AUTO: NEGATIVE
KETONES UR STRIP-MCNC: NEGATIVE MG/DL
LEUKOCYTE ESTERASE UR QL STRIP: ABNORMAL
MAGNESIUM SERPL-MCNC: 2.6 MG/DL (ref 1.6–2.6)
MCH RBC QN AUTO: 31.6 PG (ref 26.8–34.3)
MCHC RBC AUTO-ENTMCNC: 29.4 G/DL (ref 31.4–37.4)
MCV RBC AUTO: 108 FL (ref 82–98)
NITRITE UR QL STRIP: NEGATIVE
NON-SQ EPI CELLS URNS QL MICRO: ABNORMAL /HPF
PH UR STRIP.AUTO: 6.5 [PH]
PHOSPHATE SERPL-MCNC: 3.6 MG/DL (ref 2.3–4.1)
PLATELET # BLD AUTO: 281 THOUSANDS/UL (ref 149–390)
PMV BLD AUTO: 10.6 FL (ref 8.9–12.7)
POTASSIUM SERPL-SCNC: 4.7 MMOL/L (ref 3.5–5.3)
PROT SERPL-MCNC: 7.7 G/DL (ref 6.4–8.4)
PROT UR STRIP-MCNC: ABNORMAL MG/DL
PROT UR-MCNC: 15 MG/DL
PROT/CREAT UR: 0.47 MG/G{CREAT} (ref 0–0.1)
PTH-INTACT SERPL-MCNC: 146 PG/ML (ref 18.4–80.1)
RBC # BLD AUTO: 3.73 MILLION/UL (ref 3.81–5.12)
RBC #/AREA URNS AUTO: ABNORMAL /HPF
SODIUM SERPL-SCNC: 140 MMOL/L (ref 135–147)
SP GR UR STRIP.AUTO: 1.01 (ref 1–1.03)
URATE SERPL-MCNC: 5.2 MG/DL (ref 2–7.5)
UROBILINOGEN UR STRIP-ACNC: <2 MG/DL
WBC # BLD AUTO: 7.07 THOUSAND/UL (ref 4.31–10.16)
WBC #/AREA URNS AUTO: ABNORMAL /HPF

## 2022-12-02 ENCOUNTER — TELEPHONE (OUTPATIENT)
Dept: NEPHROLOGY | Facility: HOSPITAL | Age: 62
End: 2022-12-02

## 2022-12-02 ENCOUNTER — OFFICE VISIT (OUTPATIENT)
Dept: PHYSICAL THERAPY | Facility: CLINIC | Age: 62
End: 2022-12-02

## 2022-12-02 DIAGNOSIS — M79.604 LEG PAIN, BILATERAL: ICD-10-CM

## 2022-12-02 DIAGNOSIS — M79.605 LEG PAIN, BILATERAL: ICD-10-CM

## 2022-12-02 DIAGNOSIS — M54.42 CHRONIC BILATERAL LOW BACK PAIN WITH BILATERAL SCIATICA: Primary | ICD-10-CM

## 2022-12-02 DIAGNOSIS — M54.41 CHRONIC BILATERAL LOW BACK PAIN WITH BILATERAL SCIATICA: Primary | ICD-10-CM

## 2022-12-02 DIAGNOSIS — R29.898 WEAKNESS OF BOTH LOWER EXTREMITIES: ICD-10-CM

## 2022-12-02 DIAGNOSIS — G89.29 CHRONIC BILATERAL LOW BACK PAIN WITH BILATERAL SCIATICA: Primary | ICD-10-CM

## 2022-12-02 NOTE — TELEPHONE ENCOUNTER
----- Message from Ad Boyle DO sent at 12/1/2022  4:41 PM EST -----  Please let her know that her renal function is stable, electrolytes are stable    No changes

## 2022-12-02 NOTE — TELEPHONE ENCOUNTER
Called and spoke to patient  Patient aware Renal function stable, Electrolytes are stable  No changes at this time

## 2022-12-02 NOTE — PROGRESS NOTES
Daily Note     Today's date: 2022  Patient name: Leana Enriquez  : 1960  MRN: [id number]  Referring provider: Miles Fan DO  Dx:   Encounter Diagnosis     ICD-10-CM    1  Chronic bilateral low back pain with bilateral sciatica  M54 42     M54 41     G89 29       2  Leg pain, bilateral  M79 604     M79 605       3  Weakness of both lower extremities  R29 898                      Subjective: Reports having a slip and fall yesterday while trying to hang [employer] ornaments  Notes no injury just having knee pain today  Objective: See treatment diary below      Assessment: Continues to have limited endurance with cardio but able to perform standing marching with PPT today  Able to perform step ups today without sharp knee pain  Fatigue easy with sit to stand and limited anterior weight translation needing bar support to help pull herself up  Will continue to benefit from gluteal strengthening moving forward  Plan: Continue per plan of care  Precautions: Fall risk      Manuals 10/17 10/24 11/2 11/7 11/28 12/2       LE PROM  WE CM WE PF PF       STM to L/S  WE CM WE PF        Re-assessment      PF                     Neuro Re-Ed             PPT 10x supine x10 3"x10 5"x10 STD 5"x10 5x10" std        STD PPT with march 10x STD x10 x10 x10 ea 20x 20x       Supine NSP marching 10x ea x10 3"x10 ea  3"x10 3"x10 NV       Supine NSP heel slides    x10 ea NV NV       Marching with mirror feedback and PPT     20x 20x       TM walk with PPT     5 min  5 min        Std star excursion      10x ea 10x ea       Ther Ex             SLR x 3  x10 ea x10 ea    x10 ea         Clamshells    x10 ea         Bridges    x10         Pball Lumbar Flx stretch    x20         Seated HS stretch    5"x10 ea  3x15' ea       Sit to stands      3x5 with bar support       Step ups      8" 10x ea // bars       Slant HR/TR      20x ea       Ther Activity             Bike   patient refused   5 min        TM walking             Gait Training                                       Modalities

## 2022-12-05 ENCOUNTER — OFFICE VISIT (OUTPATIENT)
Dept: PHYSICAL THERAPY | Facility: CLINIC | Age: 62
End: 2022-12-05

## 2022-12-05 DIAGNOSIS — G89.29 CHRONIC BILATERAL LOW BACK PAIN WITH BILATERAL SCIATICA: Primary | ICD-10-CM

## 2022-12-05 DIAGNOSIS — M54.41 CHRONIC BILATERAL LOW BACK PAIN WITH BILATERAL SCIATICA: Primary | ICD-10-CM

## 2022-12-05 DIAGNOSIS — M79.601 PAIN OF RIGHT UPPER EXTREMITY: ICD-10-CM

## 2022-12-05 DIAGNOSIS — M79.604 LEG PAIN, BILATERAL: ICD-10-CM

## 2022-12-05 DIAGNOSIS — M79.605 LEG PAIN, BILATERAL: ICD-10-CM

## 2022-12-05 DIAGNOSIS — M54.42 CHRONIC BILATERAL LOW BACK PAIN WITH BILATERAL SCIATICA: Primary | ICD-10-CM

## 2022-12-05 RX ORDER — TRAMADOL HYDROCHLORIDE 50 MG/1
50 TABLET ORAL EVERY 8 HOURS PRN
Qty: 60 TABLET | Refills: 0 | Status: SHIPPED | OUTPATIENT
Start: 2022-12-05

## 2022-12-05 NOTE — PROGRESS NOTES
Daily Note     Today's date: 2022  Patient name: Dee Dee Dominguez  : 1960  MRN: [id number]  Referring provider: Becki Clark DO  Dx:   Encounter Diagnosis     ICD-10-CM    1  Chronic bilateral low back pain with bilateral sciatica  M54 42     M54 41     G89 29       2  Leg pain, bilateral  M79 604     M79 605                      Subjective: Pt reports she fell again over the weekend at the gas station, her ribs are a little sore  She reports since starting therapy she has noticed she can stand up from the couch on the first try without gaining momentum and trying 3x    Objective: See treatment diary below      Assessment: Pt did well with her walking today improved upright posture and more conscious and aware of her PPT during  B/l calves very tight today but improved post manual stretching  Showed improved strength and endurance with sit to stands  Plan: Continue per plan of care  Precautions: Fall risk      Manuals 10/17 10/24 11/2 11/7 11/28 12/2 12/5      LE PROM  WE CM WE PF PF WE      STM to L/S  WE CM WE PF        Re-assessment      PF                     Neuro Re-Ed             PPT 10x supine x10 3"x10 5"x10 STD 5"x10 5x10" std  10"x5  std      STD PPT with march 10x STD x10 x10 x10 ea 20x 20x 20x      Supine NSP marching 10x ea x10 3"x10 ea  3"x10 3"x10 NV       Supine NSP heel slides    x10 ea NV NV       Marching with mirror feedback and PPT     20x 20x 20x      TM walk with PPT     5 min  5 min  5 min      Std star excursion      10x ea 10x ea 10x ea      Ther Ex             SLR x 3  x10 ea x10 ea    x10 ea         Clamshells    x10 ea         Bridges    x10   x10      Pball Lumbar Flx stretch    x20         Seated HS stretch    5"x10 ea  3x15' ea 15"x3 ea      Sit to stands      3x5 with bar support 10x2 with bar support      Step ups      8" 10x ea // bars 8" 10x ea // bars      Slant HR/TR      20x ea 20x ea      Ther Activity             Bike   patient refused   5 min  5 min      TM walking             Gait Training                                       Modalities

## 2022-12-06 ENCOUNTER — TELEPHONE (OUTPATIENT)
Dept: NEPHROLOGY | Facility: CLINIC | Age: 62
End: 2022-12-06

## 2022-12-06 NOTE — TELEPHONE ENCOUNTER
Appointment Confirmation   Person confirmed appointment with  If not patient, name of the person Answering Machine    Date and time of appointment 12/7/22    Patient acknowledged and will be at appointment? no    Did you advise the patient that they will need a urine sample if they are a new patient?  N/A    Did you advise the patient to bring their current medications for verification? (including any OTC) Yes    Additional Information

## 2022-12-07 ENCOUNTER — TELEPHONE (OUTPATIENT)
Dept: FAMILY MEDICINE CLINIC | Facility: HOSPITAL | Age: 62
End: 2022-12-07

## 2022-12-07 ENCOUNTER — OFFICE VISIT (OUTPATIENT)
Dept: NEPHROLOGY | Facility: HOSPITAL | Age: 62
End: 2022-12-07

## 2022-12-07 ENCOUNTER — APPOINTMENT (OUTPATIENT)
Dept: PHYSICAL THERAPY | Facility: CLINIC | Age: 62
End: 2022-12-07

## 2022-12-07 ENCOUNTER — OFFICE VISIT (OUTPATIENT)
Dept: FAMILY MEDICINE CLINIC | Facility: HOSPITAL | Age: 62
End: 2022-12-07

## 2022-12-07 VITALS
HEIGHT: 67 IN | WEIGHT: 225 LBS | DIASTOLIC BLOOD PRESSURE: 78 MMHG | BODY MASS INDEX: 35.31 KG/M2 | SYSTOLIC BLOOD PRESSURE: 162 MMHG

## 2022-12-07 VITALS
HEART RATE: 86 BPM | DIASTOLIC BLOOD PRESSURE: 78 MMHG | WEIGHT: 225 LBS | OXYGEN SATURATION: 98 % | HEIGHT: 67 IN | BODY MASS INDEX: 35.31 KG/M2 | SYSTOLIC BLOOD PRESSURE: 146 MMHG

## 2022-12-07 DIAGNOSIS — N18.4 CKD (CHRONIC KIDNEY DISEASE) STAGE 4, GFR 15-29 ML/MIN (HCC): ICD-10-CM

## 2022-12-07 DIAGNOSIS — I10 PRIMARY HYPERTENSION: ICD-10-CM

## 2022-12-07 DIAGNOSIS — E78.00 HYPERCHOLESTEREMIA: Chronic | ICD-10-CM

## 2022-12-07 DIAGNOSIS — Z00.00 MEDICARE ANNUAL WELLNESS VISIT, SUBSEQUENT: Primary | ICD-10-CM

## 2022-12-07 DIAGNOSIS — M17.11 PRIMARY OSTEOARTHRITIS OF RIGHT KNEE: ICD-10-CM

## 2022-12-07 DIAGNOSIS — M17.12 PRIMARY OSTEOARTHRITIS OF LEFT KNEE: ICD-10-CM

## 2022-12-07 DIAGNOSIS — I10 PRIMARY HYPERTENSION: Primary | ICD-10-CM

## 2022-12-07 RX ORDER — AMLODIPINE BESYLATE 5 MG/1
5 TABLET ORAL DAILY
Qty: 30 TABLET | Refills: 3 | Status: SHIPPED | OUTPATIENT
Start: 2022-12-07

## 2022-12-07 NOTE — PROGRESS NOTES
OFFICE FOLLOW UP - Nephrology   Koko Velasco 63 y o  female MRN: [id number]    Encounter: [id number]        ASSESSMENT and PLAN:  Diagnoses and all orders for this visit:    29-year-old female with a past medical history of bipolar disorder, stage 4 chronic kidney disease who presents for follow-up    Chronic kidney disease, stage IV  - creatinine fluctuates but seems to have some progression estimated GFR now consistently below 20  - she is currently not on any nephrotoxic agent,  Was on lithium for several years  - renal cyst is stable in size   -she is off her potassium supplementation  -blood pressures have been well controlled  -urine protein to creatinine ratios incrased mildly but less than 1/2 gram  -she has had echogenic kidneys that have been small in size since at least 2016  -completed CKD education  - status post AV fistula creation left radiocephalic with good bruit and thrill  - Renal transplant evaluation scheduled for later this month  -no urgent indicatoin for RRT but monitoring    Bipolar 2 disorder ([address] ), eating disorder  - she was on lithium in her 25s and may have some chronic interstitial nephritis associated with the she also has some mild hypernatremia  - she is currently being treated with Seroquel    Cyst of right kidney  - she has had recent MRIs and CT scan that have been done serially most recently had a CT scan in February of 2020 this is grossly stable partially exophytic nodule left lower pole  -A CT scan from march of 2022 shows unremarkable kidneys and no hydronephrosis    Secondary renal hyperparathyroidism ([address] )  - she has a PTH and been high, calcium is mildly high,  Could be lithium induced vs element of primary HyperPTH  Will monitor    Proteinuria  - she has a positive urine protein to creatinine ratio but a negative urinalysis she is  mildly anemic with CKD    She was on lithium several years ago she also has had acute kidney injury from chronic lactulose  Use  - limited serologic workup was negative last urine protein to creatinine ratio was negligible    hypertension  -she is now on carvedilol 25 mg BID,  and amiloride 5 mg twice daily-(in the setting of DI associated with chronic lithium use Amiloride was initiated)  -blood pressure is elevated and has been elevated at home  -will restart amlodipine at 5 mg daily monitor edema  -weight is stable-history of eating disorder in the past and follows with psychiatry    Hypernatremia  -her serum sodiums are stable  -continue amiloride 5 mg twice daily for now if potassium is rising because of worsening CKD will have to down titrate her or hold    Elevated LFTs  -LFTs are stable  -continue statin    DISCUSSION:    1  Transplant evaluation pending  2  Restart amlodipine 5 mg daily  3  No urgent need for RRT  4  Continue follow up every 3 months    HPI: Ki Liu is a 63 y o  female who is here for No chief complaint on file  Since her last office visit she is doing ok, sleeping more, more fatigue, some sob with decreased exercise and mobility  No cp, fevers chills  No nausea, vomiting  No diarrhea or constipation  Following up with Psychiatry regularly  No acute complaints currently      ROS:   All the systems were reviewed and were negative except as documented on the HPI      Allergies: Pollen extract    Medications:   Current Outpatient Medications:   •  acetaminophen (TYLENOL) 650 mg CR tablet, Take 1 tablet (650 mg total) by mouth every 8 (eight) hours as needed for mild pain, Disp: 30 tablet, Rfl: 0  •  albuterol (Ventolin HFA) 90 mcg/act inhaler, Inhale 2 puffs every 6 (six) hours as needed for wheezing or shortness of breath, Disp: 8 5 g, Rfl: 3  •  AMILoride 5 mg tablet, Take 1 tablet (5 mg total) by mouth 2 (two) times a day, Disp: 180 tablet, Rfl: 3  •  amLODIPine (NORVASC) 5 mg tablet, Take 1 tablet (5 mg total) by mouth daily, Disp: 30 tablet, Rfl: 3  •  carvedilol (COREG) 25 mg tablet, Take 1 tablet (25 mg total) by mouth 2 (two) times a day with meals, Disp: 180 tablet, Rfl: 3  •  cholecalciferol (VITAMIN D3) 1,000 units tablet, Take 5 tablets (5,000 Units total) by mouth daily, Disp: 90 tablet, Rfl: 5  •  clonazePAM (KlonoPIN) 0 5 mg tablet, Take 1 tablet (0 5 mg total) by mouth 3 (three) times a day, Disp: 270 tablet, Rfl: 0  •  fluvoxaMINE (LUVOX) 100 mg tablet, Take 1 tab in the morning, 2 tab at night, Disp: 90 tablet, Rfl: 0  •  lamoTRIgine (LaMICtal) 200 MG tablet, Take 1 tablet (200 mg total) by mouth 2 (two) times a day, Disp: 180 tablet, Rfl: 0  •  methylPREDNISolone 4 MG tablet therapy pack, Use as directed on package, Disp: 21 tablet, Rfl: 0  •  naloxone (NARCAN) 4 mg/0 1 mL nasal spray, Administer 1 spray into a nostril   If no response after 2-3 minutes, give another dose in the other nostril using a new spray , Disp: 1 each, Rfl: 1  •  omeprazole (PriLOSEC) 40 MG capsule, Take 1 capsule (40 mg total) by mouth daily before breakfast, Disp: 90 capsule, Rfl: 3  •  ondansetron (ZOFRAN) 4 mg tablet, Take 1 tablet (4 mg total) by mouth every 8 (eight) hours as needed for nausea or vomiting, Disp: 60 tablet, Rfl: 1  •  QUEtiapine (SEROquel) 300 mg tablet, Take 1 tablet (300 mg total) by mouth in the morning 300mg in morning, Disp: 90 tablet, Rfl: 0  •  QUEtiapine (SEROquel) 400 MG tablet, Take 1 tablet (400 mg total) by mouth daily at bedtime, Disp: 90 tablet, Rfl: 0  •  rosuvastatin (CRESTOR) 20 MG tablet, Take 1 tablet (20 mg total) by mouth every evening, Disp: 90 tablet, Rfl: 3  •  traMADol (Ultram) 50 mg tablet, Take 1 tablet (50 mg total) by mouth every 8 (eight) hours as needed for severe pain, Disp: 60 tablet, Rfl: 0  •  aspirin 81 mg chewable tablet, Chew 1 tablet (81 mg total) daily (Patient not taking: Reported on 11/28/2022), Disp: , Rfl:   •  fluvoxaMINE (LUVOX) 100 mg tablet, 1 tab am , 2 tabs HS (Patient not taking: Reported on 12/7/2022), Disp: 270 tablet, Rfl: 0  •  linaCLOtide (Linzess) [address], Take 1 capsule by mouth daily (Patient not taking: Reported on 2022), Disp: 90 capsule, Rfl: 0  •  Polyethylene Glycol 3350 (MIRALAX PO), Take by mouth as needed  (Patient not taking: Reported on 2022), Disp: , Rfl:   •  QUEtiapine (SEROquel) 100 mg tablet, Take 1 tablet (100 mg total) by mouth daily at bedtime 100mg with 400mg bedtime, Disp: 90 tablet, Rfl: 0    Past Medical History:   Diagnosis Date   • Anxiety    • Anxiety disorder    • Arthritis    • At risk for falls    • Bipolar 2 disorder (HCC)    • Chronic back pain    • Chronic kidney disease    • Closed fracture of distal end of right fibula with routine healing 2020   • COVID-19     in 2021   • CVA (cerebral vascular accident) [facility])     noted on MRI in the past   • Depression    • GERD (gastroesophageal reflux disease)    • Hypercholesteremia    • Hypernatremia    • Hypertension    • Hypokalemia    • Idiopathic chronic pancreatitis ([facility] [address] ) 2018   • Intervertebral disc disorder with radiculopathy of lumbosacral region     resolved: 2015   • Kidney disease    • Limb alert care status     LUE-fistula   • Panic attacks    • Pericardial effusion    • PONV (postoperative nausea and vomiting)    • Psychiatric problem    • Radiculitis     resolved: 2015   • Secondary renal hyperparathyroidism ([facility] [address] )    • Stroke [facility])    • Vitamin D deficiency      Past Surgical History:   Procedure Laterality Date   • BUNIONECTOMY      Left foot    • CAST APPLICATION Right     Procedure: Application short-arm thumb spica splint;  Surgeon: Cyn Goetz MD;  Location:  MAIN OR;  Service: Orthopedics   • COLON SURGERY     • COLONOSCOPY  2021   • DILATION AND CURETTAGE OF UTERUS     • INDUCED       surgically induced   • VA ANASTOMOSIS,AV,ANY SITE Left 2019    Procedure: CREATION FISTULA ARTERIOVENOUS (AV) left wrists possible left upper;  Surgeon: Sony Barney MD;  Location:  MAIN OR;  Service: Vascular   • CO CORRJ HALLUX VALGUS W/SESMDC W/DIST METAR OSTEOT Left 7/1/2019    Procedure: Feliberto Zamorano;  Surgeon: Kenna Willard DPM;  Location: [facility] MAIN OR;  Service: Podiatry   • [address]  97  W/SESMDC W/DIST Katharina Sites Right 8/3/2020    Procedure: Gladys Kwan;  Surgeon: Kenna Willard DPM;  Location: [facility];  Service: Podiatry   • CO CORRJ HALLUX VALGUS W/SESMDC W/PROX PHLNX OSTEOT Right 9/27/2021    Procedure: Renatelemuel Andersons, right tameka osteotomy and 2nd claw toe correction;  Surgeon: Arcenio Chisholm MD;  Location: [facility];  Service: Orthopedics   • CO ERCP DX COLLECTION SPECIMEN BRUSHING/WASHING N/A 4/11/2018    Procedure: ENDOSCOPIC RETROGRADE CHOLANGIOPANCREATOGRAPHY (ERCP); Surgeon: Eryn Ibarra MD;  Location: [facility];  Service: Gastroenterology   • CO FINGER TENDON TRANSFER,2-5 FINGRS Right 6/23/2022    Procedure: Right ring finger flexor digitorum superficialis to flexor pollicis longus tendon transfer;  Surgeon: Corinne Hassan MD;  Location: [facility];  Service: Orthopedics   • CO LAP, SURG PROCTOPEXY N/A 7/13/2018    Procedure: ROBOTIC SIGMOID RESECTION / RECTOPEXY;  Surgeon: Imtiaz Quarles MD;  Location: [facility];  Service: Colorectal   • CO OPEN TREATMENT RADIAL SHAFT FRACTURE Right 10/11/2021    Procedure: OPEN REDUCTION W/ INTERNAL FIXATION (ORIF) RADIUS (WRIST), RIGHT DISTAL;  Surgeon: Pooja Ramirez MD;  Location: [facility];  Service: Orthopedics   • CO REMOVAL DEEP IMPLANT Right 6/23/2022    Procedure: Removal of hardware volar aspect right distal radius (distal radial plate and screws);   Surgeon: Corinne Hassan MD;  [facility]: [facility];  Service: Orthopedics   • CO SIGMOIDOSCOPY FLX DX W/COLLJ SPEC BR/WA IF PFRMD N/A 7/13/2018    Procedure: SIGMOIDOSCOPY FLEXIBLE;  Surgeon: Imtiaz Quarles MD;  Location: [facility];  Service: Colorectal   • TUBAL LIGATION Bilateral 1997   • [address] THYROID BIOPSY  7/30/2019     Family History Problem Relation Age of Onset   • Bipolar disorder Mother    • Mental illness Mother         depression   • Stroke Mother    • Dementia Mother    • Colon polyps Mother    • Heart disease Father    • Hypertension Father    • Diabetes Father    • Other Family         Back disorder   • Diabetes Family    • Heart disease Family    • Hypertension Family    • Stroke Family    • Thyroid disease Family    • Breast cancer Paternal Grandmother         age unknown   • Breast cancer Paternal Aunt         age unknown   • Breast cancer Maternal Aunt         age unknown   • Mental illness Sister    • Colon polyps Sister    • Mental illness Sister    • Heart disease Sister    • No Known Problems Sister    • Breast cancer Sister 76   • Other Son         pituitary tumor   • Hypertension Son    • Obesity Son    • No Known Problems Son    • No Known Problems Maternal Grandmother    • No Known Problems Maternal Grandfather    • No Known Problems Paternal Grandfather    • Breast cancer Paternal Aunt         age unknown   • Substance Abuse Neg Hx         neg fam hx   • Colon cancer Neg Hx       reports that she has never smoked  She has never used smokeless tobacco  She reports that she does not currently use drugs  She reports that she does not drink alcohol  Physical Exam:   Vitals:    12/07/22 0933   BP: 162/78   BP Location: Right arm   Patient Position: Sitting   Cuff Size: Adult   Weight: 102 kg (225 lb)   Height: 5' 7" (1 702 m)     Body mass index is 35 24 kg/m²      General: conscious, cooperative, in no acute distress  Eyes: conjunctivae pink, anicteric sclerae  ENT: lips and mucous membranes moist  Neck: supple, no JVD  Chest: clear breath sounds bilateral, no crackles, ronchus or wheezings  CVS: normal rate, regular rhythm  Abdomen: soft, non-tender, non-distended, normoactive bowel sounds  Extremities:  Trace edema in the lower extremity  Skin: no rash  Neuro: awake, alert, oriented  Psych:  Pleasant affect    Lab Results:    Results for orders placed or performed in visit on 12/01/22   Comprehensive metabolic panel   Result Value Ref Range    Sodium 140 135 - 147 mmol/L    Potassium 4 7 3 5 - 5 3 mmol/L    Chloride 110 (H) 96 - 108 mmol/L    CO2 24 21 - 32 mmol/L    ANION GAP 6 4 - 13 mmol/L    BUN 35 (H) 5 - 25 mg/dL    Creatinine 2 65 (H) 0 60 - 1 30 mg/dL    Glucose, Fasting 125 (H) 65 - 99 mg/dL    Calcium 10 2 (H) 8 3 - 10 1 mg/dL    AST 28 5 - 45 U/L    ALT 39 12 - 78 U/L    Alkaline Phosphatase 179 (H) 46 - 116 U/L    Total Protein 7 7 6 4 - 8 4 g/dL    Albumin 3 6 3 5 - 5 0 g/dL    Total Bilirubin 0 29 0 20 - 1 00 mg/dL    eGFR 18 ml/min/1 73sq m   Magnesium   Result Value Ref Range    Magnesium 2 6 1 6 - 2 6 mg/dL   Phosphorus   Result Value Ref Range    Phosphorus 3 6 2 3 - 4 1 mg/dL   PTH, intact   Result Value Ref Range     0 (H) 18 4 - 80 1 pg/mL   CBC and Platelet   Result Value Ref Range    WBC 7 07 4 31 - 10 16 Thousand/uL    RBC 3 73 (L) 3 81 - 5 12 Million/uL    Hemoglobin 11 8 11 5 - 15 4 g/dL    Hematocrit 40 1 34 8 - 46 1 %     (H) 82 - 98 fL    MCH 31 6 26 8 - 34 3 pg    MCHC 29 4 (L) 31 4 - 37 4 g/dL    RDW 12 9 11 6 - 15 1 %    Platelets 628 381 - 605 Thousands/uL    MPV 10 6 8 9 - 12 7 fL   Uric acid   Result Value Ref Range    Uric Acid 5 2 2 0 - 7 5 mg/dL   Urinalysis with microscopic   Result Value Ref Range    Color, UA Colorless     Clarity, UA Clear     Specific [place], UA 1 011 1 003 - 1 030    pH, UA 6 5 4 5, 5 0, 5 5, 6 0, 6 5, 7 0, 7 5, 8 0    Leukocytes, UA Trace (A) Negative    Nitrite, UA Negative Negative    Protein, UA Trace (A) Negative mg/dl    Glucose, UA Negative Negative mg/dl    Ketones, UA Negative Negative mg/dl    Urobilinogen, UA <2 0 <2 0 mg/dl mg/dl    Bilirubin, UA Negative Negative    Occult Blood, UA Negative Negative    RBC, UA None Seen None Seen, 1-2 /hpf    WBC, UA 1-2 None Seen, 1-2 /hpf    Epithelial Cells Occasional None Seen, Occasional /hpf Bacteria, UA Occasional None Seen, Occasional /hpf   Protein / creatinine ratio, urine   Result Value Ref Range    Creatinine, Ur 31 9 mg/dL    Protein Urine Random 15 mg/dL    Prot/Creat Ratio, Ur 0 47 (H) 0 00 - 0 10     *Note: Due to a large number of results and/or encounters for the requested time period, some results have not been displayed  A complete set of results can be found in Results Review  Portions of the record may have been created with voice recognition software  Occasional wrong word or "sound a like" substitutions may have occurred due to the inherent limitations of voice recognition software  Read the chart carefully and recognize, using context, where substitutions have occurred  If you have any questions, please contact the dictating provider

## 2022-12-07 NOTE — PATIENT INSTRUCTIONS
Chronic Kidney Disease   WHAT YOU NEED TO KNOW:   Chronic kidney disease (CKD) is the gradual and permanent loss of kidney function  It is also called chronic kidney failure, or chronic renal insufficiency  Normally, the kidneys remove fluid, chemicals, and waste from your blood  These wastes are turned into urine by your kidneys  CKD may worsen over time and lead to kidney failure  Your CKD team will help you and your family plan for your care at home  The team will help you create goals and find ways to meet your goals  Your care plan may change over time as your needs change  DISCHARGE INSTRUCTIONS:   Call your local emergency number (911 in the [address]) if:   You have a seizure  You have shortness of breath  Return to the emergency department if:   You are confused and very drowsy  Call your doctor or nephrologist if:   You suddenly gain or lose more weight than your healthcare provider has told you is okay  You have itchy skin or a rash  You urinate more or less than you normally do  You have blood in your urine  You have nausea and are vomiting  You have fatigue or muscle weakness  You have hiccups that will not stop  You have questions or concerns about your condition or care  Medicines:   Medicines  may be given to decrease blood pressure and get rid of extra fluid  You may also receive medicine to manage health conditions that may occur with CKD, such as anemia, diabetes, and heart disease  Take your medicine as directed  Contact your healthcare provider if you think your medicine is not helping or if you have side effects  Tell him or her if you are allergic to any medicine  Keep a list of the medicines, vitamins, and herbs you take  Include the amounts, and when and why you take them  Bring the list or the pill bottles to follow-up visits  Carry your medicine list with you in case of an emergency  What you can do to manage CKD:   Management may include making some lifestyle changes  Tell your healthcare provider if you have any concerns about being able to make changes  He or she can help you find solutions, including working with specialists  Ask for help creating a plan to break large goals into smaller steps  Your plan may include any of the following:  Manage other health conditions  Your healthcare provider will work with you to make a care plan that meets your needs  You will be checked regularly for heart disease or other conditions that can make CKD worse, such as diabetes  Your blood pressure will be closely monitored  You will also get a target blood pressure and help making a plan to reach your target  This may include taking your blood pressure at home  Maintain a healthy weight  Your weight and body mass index (BMI) will be checked regularly  BMI helps find if your weight is healthy for your height  Your healthcare provider will use other tests to check your muscle and protein levels  Extra weight can strain your kidneys  A low weight or low muscle mass can make you feel more tired  You may have trouble doing your daily activities  Ask your provider what a healthy weight is for you  He or she can help you create a plan to lose or gain weight safely, if needed  The plan may include keeping a food diary  This is a list of foods and liquids you have each day  Your provider will use the diary to help you make changes, if needed  Changes are based on your health and any other conditions you have, such as diabetes  Create an exercise plan  Regular exercise can help you manage CKD, high blood pressure, and diabetes  Exercise also helps control weight  Your provider can help you create exercise goals and a plan to reach those goals  For example, your goal may be to exercise for 30 minutes in a day  Your plan can include breaking exercise into 10 minute sessions, 3 times during the day  Create a healthy eating plan    Your provider may tell you to eat food low in potassium, phosphorus, or protein  Your provider may also recommend vitamin or mineral supplements  Do not take any supplements without talking to your provider  A dietitian can help you plan meals if needed  Ask how much liquid to drink each day and which liquids are best for you  Limit sodium (salt) as directed  You may need to limit sodium to less than 2,300 milligrams (mg) each day  Ask your dietitian or healthcare provider how much sodium you can have each day  The amount depends on your stage of kidney disease  Table salt, canned foods, soups, salted snacks, and processed meats, like deli meats and sausage, are high in sodium  Your provider or a dietitian can show you how to read food labels for sodium  Limit alcohol as directed  Alcohol can cause fluid retention and can affect your kidneys  Ask how much alcohol is safe for you  A drink of alcohol is 12 ounces of beer, 5 ounces of wine, or 1½ ounces of liquor  Do not smoke  Nicotine and other chemicals in cigarettes and cigars can cause kidney damage  Ask your provider for information if you currently smoke and need help to quit  E-cigarettes or smokeless tobacco still contain nicotine  Talk to your provider before you use these products  Ask about over-the-counter medicines  Medicines such as NSAIDs and laxatives may harm your kidneys  Some cough and cold medicines can raise your blood pressure  Always ask if a medicine is safe before you take it  Ask about vaccines you may need  CKD can increase your risk for infections such as pneumonia, influenza, and hepatitis  Vaccines lower your risk for infection  Your healthcare provider will tell you which vaccines you need and when to get them  Follow up with your doctor or nephrologist as directed: You will need to return for tests to monitor your kidney and nerve function, and your parathyroid hormone level   Your medicines may be changed, based on certain test results  Write down your questions so you remember to ask them during your visits  © Copyright [employer] 2022 Information is for End User's use only and may not be sold, redistributed or otherwise used for commercial purposes  All illustrations and images included in CareNotes® are the copyrighted property of [employer]  or [employer]  The above information is an  only  It is not intended as medical advice for individual conditions or treatments  Talk to your doctor, nurse or pharmacist before following any medical regimen to see if it is safe and effective for you

## 2022-12-07 NOTE — PROGRESS NOTES
Assessment/Plan:    End stage renal disease (HCC)  End-stage renal disease pulses access  She is right-handed and on exam possibility of a left wrist AV fistula versus left upper arm which will be determined at the time of surgery with on-table ultrasound evaluation  Plan:  Left wrist versus left upper arm AV fistula sometime in the near future [id number]         Diagnoses and all orders for this visit:    End stage renal disease [facility])  -     Case request operating room: CREATION FISTULA ARTERIOVENOUS (AV) left wrists possible left upper; Standing  -     Basic metabolic panel; Future  -     CBC and Platelet; Future  -     oxyCODONE-acetaminophen (PERCOCET) 5-325 mg per tablet; Take 1 tablet by mouth every 4 (four) hours as needed for moderate painMax Daily Amount: 6 tablets  -     Case request operating room: CREATION FISTULA ARTERIOVENOUS (AV) left wrists possible left upper    Generalized edema  -     Ambulatory referral to Vascular Surgery    Stage 4 chronic kidney disease ([facility] [address] )  -     Ambulatory referral to Vascular Surgery  -     Case request operating room: CREATION FISTULA ARTERIOVENOUS (AV) left wrists possible left upper; Standing  -     Case request operating room: CREATION FISTULA ARTERIOVENOUS (AV) left wrists possible left upper    Other orders  -     Diet NPO; Sips with meds; Standing  -     Void on call to OR; Standing  -     Insert peripheral IV; Standing  -     Nursing Communication Use 2 CHG cloths, have the patient wash his/her surgical site or have staff wash the site if patient is unable to; Standing  -     Nursing Communication Swab both nares with Povidone-Iodine solution, EXCLUDE if patient has shellfish/Iodine allergy; Standing  -     chlorhexidine (PERIDEX) 0 12 % oral rinse 15 mL  -     Place sequential compression device; Standing  -     ceFAZolin (ANCEF) 2,000 mg in dextrose 5 % 100 mL IVPB        Subjective:      Patient ID: Dale Aguirre is a 64 y o  female  HPI end-stage renal disease with symptoms of malaise fatigue  In need of permanent dialysis access planning left arm AV fistula    The following portions of the patient's history were reviewed and updated as appropriate: allergies, current medications, past family history, past medical history, past social history, past surgical history and problem list     Review of Systems  end-stage renal disease, easy bruisability, no GI or  symptoms no constipation, no constitutional symptoms no fever weight loss or fatigue, no rashes, all other systems negative    Objective:      /88 (BP Location: Right arm, Patient Position: Sitting, Cuff Size: Adult)   Pulse 91   Temp (!) 100 7 °F (38 2 °C) (Tympanic)   Ht 5' 7" (1 702 m)   Wt 94 8 kg (209 lb)   LMP  (LMP Unknown)   BMI 32 73 kg/m²          Physical Exam          Oriented x3 no evidence of clinical depression  Eyes:  Sclera non-icteric    Skin: normal without evidence of inflammation    Neck is supple carotid pulses equal bilaterally no bruits heard    Chest lungs clear, heart regular rhythm  Abdomen soft nontender no evidence of pulsatile masses  Pulses are palpable bilateral Femoral  Popliteal, DP and PT  A tourniquet placed left upper extremity easily palpable cephalic vein at the wrist and antecubital region easily palpable brachial and radial pulses    Neurological exam intact cranial nerves 2-12 grossly intact no gross motor sensory deficits detected  Imaging viewed and reviewed      I have reviewed and made appropriate changes to the review of systems input by the medical assistant      Vitals:    09/25/19 1436   BP: 148/88   BP Location: Right arm   Patient Position: Sitting   Cuff Size: Adult   Pulse: 91   Temp: (!) 100 7 °F (38 2 °C)   TempSrc: Tympanic   Weight: 94 8 kg (209 lb)   Height: 5' 7" (1 702 m)       Patient Active Problem List   Diagnosis    Hypercholesteremia    Spondylolisthesis at L5-S1 level    Renal cyst  Vitamin D deficiency    Secondary renal hyperparathyroidism (HCC)    Herniated nucleus pulposus, L5-S1    Primary osteoarthritis of right knee    Age related osteoporosis    Cardiac murmur    Rectal prolapse    Elevated LFTs    Weight gain    Essential hypertension    Hyperprolactinemia ([facility] [address] )    Screening for breast cancer    Bipolar 1 disorder, depressed, severe (HCC)    Obsessive compulsive disorder    Panic disorder with agoraphobia    Eating disorder    Hyperparathyroidism ([facility] [address] )    Shortness of breath    Pericardial effusion    Benign neoplasm of colon    Drug-induced constipation    Infarction of left basal ganglia (HCC)    Contusion of right knee    Bunion of great toe of left foot    Abnormal chest CT    Hyperphosphatemia    Abnormal thyroid exam    Postoperative state    Obesity (BMI 30 0-34  9)    Thyroid nodule    Moderate persistent asthma without complication    Stage 4 chronic kidney disease (HCC)    End stage renal disease (HCC)       Past Surgical History:   Procedure Laterality Date    BUNIONECTOMY      Left foot     COLON SURGERY      COLONOSCOPY  2018    DILATION AND CURETTAGE OF UTERUS      INDUCED       surgically induced    CT CORRJ HALLUX VALGUS W/SESMDC W/DIST METAR OSTEOT Left 2019    Procedure: Breanna Fernandez;  Surgeon: Juana Rinne, DPM;  [facility];  Service: Podiatry    CT ERCP DX COLLECTION SPECIMEN BRUSHING/WASHING N/A 2018    Procedure: ENDOSCOPIC RETROGRADE CHOLANGIOPANCREATOGRAPHY (ERCP);   Surgeon: Shelley Guzman MD;  [facility];  Service: Gastroenterology    CT LAP, SURG PROCTOPEXY N/A 2018    Procedure: ROBOTIC SIGMOID RESECTION / RECTOPEXY;  Surgeon: Florence Brown MD;  [facility];  Service: Colorectal    CT SIGMOIDOSCOPY FLX DX W/COLLJ SPEC BR/WA IF PFRMD N/A 2018    Procedure: Miquel Cantu;  Surgeon: Florence Brown MD;  [facility];  Service: Colorectal    TUBAL LIGATION Bilateral 1997    US GUIDED THYROID BIOPSY  7/30/2019       Family History   Problem Relation Age of Onset    Bipolar disorder Mother     Mental illness Mother         depression    Stroke Mother     Dementia Mother     Heart disease Father     Hypertension Father     Diabetes Father     Other Family         Back disorder    Diabetes Family     Heart disease Family     Hypertension Family     Breast cancer Family     Stroke Family     Thyroid disease Family     Breast cancer Paternal Grandmother     Breast cancer Paternal [de-identified]     Breast cancer Maternal Aunt     Mental illness Sister     Mental illness Sister     Heart disease Sister     No Known Problems Sister     No Known Problems Sister     Other Son         pituitary tumor    Hypertension Son     Obesity Son     No Known Problems Son     Substance Abuse Neg Hx         neg fam hx       Social History     Socioeconomic History    Marital status:      Spouse name: Not on file    Number of children: Not on file    Years of education: Not on file    Highest education level: Not on file   Occupational History    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   Social Needs    Financial resource strain: Not on file    Food insecurity:     Worry: Not on file     Inability: Not on file    Transportation needs:     Medical: Not on file     Non-medical: Not on file   Tobacco Use    Smoking status: Never Smoker    Smokeless tobacco: Never Used   Substance and Sexual Activity    Alcohol use: Never     Frequency: Never     Binge frequency: Never    Drug use: Yes     Types: Marijuana     Comment: ex marijuana user many years ago, occasional cocaine in the past     Sexual activity: Not Currently   Lifestyle    Physical activity:     Days per week: Not on file     Minutes per session: Not on file    Stress: Not on file   Relationships    Social connections:     Talks on phone: Not on file     Gets together: Not on file     Attends [employer] service: Not on file     Active member of club or organization: Not on file     Attends meetings of clubs or organizations: Not on file     Relationship status: Not on file    Intimate partner violence:     Fear of current or ex partner: Not on file     Emotionally abused: Not on file     Physically abused: Not on file     Forced sexual activity: Not on file   Other Topics Concern    Not on file   Social History Narrative    Daily caffeine consumption 2-3 servings a day    Lives with family  No living will  Has dentures---no dental care  Primary language--English  Feels safe at home         No Known Allergies      Current Outpatient Medications:     acetaminophen (TYLENOL) 325 mg tablet, Take 650 mg by mouth every 6 (six) hours as needed for mild pain, Disp: , Rfl:     albuterol (PROAIR HFA) 90 mcg/act inhaler, Inhale 2 puffs every 6 (six) hours as needed for wheezing, Disp: 1 Inhaler, Rfl: 5    AMILoride 5 mg tablet, 1 bid, Disp: , Rfl:     amLODIPine (NORVASC) 5 mg tablet, Take 1 tablet (5 mg total) by mouth 2 (two) times a day, Disp: 180 tablet, Rfl: 3    aspirin (ECOTRIN LOW STRENGTH) 81 mg EC tablet, Take 1 tablet (81 mg total) by mouth daily, Disp: 30 tablet, Rfl: 0    atorvastatin (LIPITOR) 40 mg tablet, Take 0 5 tablets (20 mg total) by mouth daily, Disp: 30 tablet, Rfl: 3    budesonide-formoterol (SYMBICORT) 160-4 5 mcg/act inhaler, Inhale 2 puffs 2 (two) times a day Rinse mouth after use , Disp: 3 Inhaler, Rfl: 3    calcium acetate (PHOSLO) 667 mg capsule, Take 1 capsule (667 mg total) by mouth 3 (three) times a day with meals, Disp: 90 capsule, Rfl: 3    carvedilol (COREG) 3 125 mg tablet, Take 1 tablet (3 125 mg total) by mouth 2 (two) times a day with meals, Disp: 60 tablet, Rfl: 2    clonazePAM (KlonoPIN) 0 5 mg tablet, TAKE 1 TABLET BY MOUTH 3  TIMES A DAY, Disp: 270 tablet, Rfl: 0    fluvoxaMINE (LUVOX) 100 mg tablet, Take 100 mg by mouth 3 (three) times a day  , Disp: , Rfl:     lamoTRIgine (LaMICtal) 100 mg tablet, Take 100 mg by mouth 3 (three) times a day Pt takes 3 times daily, Disp: , Rfl:     linaCLOtide (LINZESS) 290 MCG CAPS, Take 1 capsule by mouth daily for 90 days, Disp: 90 capsule, Rfl: 3    montelukast (SINGULAIR) 10 mg tablet, TAKE 1 TABLET BY MOUTH AT BEDTIME, Disp: 30 tablet, Rfl: 11    omeprazole (PriLOSEC) 40 MG capsule, TAKE 1 CAPSULE BY MOUTH  DAILY AT BEDTIME, Disp: 90 capsule, Rfl: 3    Polyethylene Glycol 3350 (MIRALAX PO), Take by mouth as needed , Disp: , Rfl:     psyllium (METAMUCIL) 58 6 % powder, Take 1 packet by mouth daily, Disp: , Rfl:     QUEtiapine (SEROQUEL) 300 mg tablet, Take 1 tablet (300 mg total) by mouth every morning 1 in the AM     ( also takes 400 mg at bedtime), Disp: 90 tablet, Rfl: 3    QUEtiapine (SEROquel) 400 MG tablet, 1 at bedtime, Disp: , Rfl:     oxyCODONE-acetaminophen (PERCOCET) 5-325 mg per tablet, Take 1 tablet by mouth every 4 (four) hours as needed for moderate painMax Daily Amount: 6 tablets, Disp: 10 tablet, Rfl: 0        stated

## 2022-12-07 NOTE — PROGRESS NOTES
Assessment and Plan:     Problem List Items Addressed This Visit        Cardiovascular and Mediastinum    Primary hypertension       Musculoskeletal and Integument    Primary osteoarthritis of right knee    Primary osteoarthritis of left knee       Genitourinary    CKD (chronic kidney disease) stage 4, GFR 15-29 ml/min ([facility])     Lab Results   Component Value Date    EGFR 18 12/01/2022    EGFR 19 10/05/2022    EGFR 18 08/31/2022    CREATININE 2 65 (H) 12/01/2022    CREATININE 2 61 (H) 10/05/2022    CREATININE 2 74 (H) 08/31/2022   to have  Labs every 3 months with  Dr Geovani Stewart- stable- for renal transplant eval              Other    Hypercholesteremia (Chronic)   Other Visit Diagnoses     Medicare annual wellness visit, subsequent    -  Primary           Preventive health issues were discussed with patient, and age appropriate screening tests were ordered as noted in patient's After Visit Summary  Personalized health advice and appropriate referrals for health education or preventive services given if needed, as noted in patient's After Visit Summary  History of Present Illness:     Patient presents for a Medicare Wellness Visit    Here for medicare wellness   also ongoing orthopedic issues with hands and knees   saw Dr Geovani Stewart today- will have appt with Dr Sabine Anderson with translpkant team to assess for  This- seen every 3 months   hyperlipidemia- she thinks she is taking crestor 20 mg twice daily- will have n her check- labs done this month showed worsening of total chol and triglycerides     Patient Care Team:  Cher Parra DO as PCP - General (Internal Medicine)  Jaye Vincent MD as PCP - Endocrinology (Endocrinology)  Jerel Love MD Kelsey Jointer, MD as Endoscopist  Page Pinto MD (Vascular Surgery)  Emma Musa DO (Nephrology)  Jorge Denny MD (Nephrology)     Review of Systems:     Review of Systems   Constitutional: Negative for fatigue     HENT: Negative for congestion and postnasal drip  Respiratory: Negative for shortness of breath  All other systems reviewed and are negative         Problem List:     Patient Active Problem List   Diagnosis   • Hypercholesteremia   • Spondylolisthesis at L5-S1 level   • Renal cyst   • Vitamin D deficiency   • Secondary renal hyperparathyroidism ([facility])   • Herniated nucleus pulposus, L5-S1   • Idiopathic chronic pancreatitis ([facility])   • Primary osteoarthritis of right knee   • Age related osteoporosis   • Cardiac murmur   • Rectal prolapse   • Elevated LFTs   • Primary hypertension   • Hyperprolactinemia ([facility])   • Bipolar 1 disorder, depressed, severe ([facility])   • Obsessive compulsive disorder   • Panic disorder with agoraphobia   • Eating disorder   • Hyperparathyroidism ([address] )   • Benign neoplasm of colon   • Drug-induced constipation   • Infarction of left basal ganglia ([facility])   • Abnormal chest CT   • Hyperphosphatemia   • Abnormal thyroid exam   • Thyroid nodule   • Moderate persistent asthma without complication   • Primary osteoarthritis of left knee   • Steal syndrome dialysis vascular access [facility])   • AVF (arteriovenous fistula) ([facility])   • Right patellofemoral syndrome   • CKD (chronic kidney disease) stage 4, GFR 15-29 ml/min ([facility])   • Bilateral sacroiliitis ([facility])   • Hallux valgus, right   • Acquired hallux interphalangeus of right foot   • Effusion of right knee   • Left knee tendonitis   • Family history of cardiac disorder in father   • Anxiety   • Claw toe, acquired, right   • Injury of plantar plate, right, initial encounter   • Acquired hallux interphalangeus, right   • Closed Colles' fracture of right radius   • Aftercare following surgery of the musculoskeletal system   • Acute shoulder pain   • Accelerated hypertension   • Nausea   • GERD (gastroesophageal reflux disease)   • End stage renal disease ([address] )   • Injury of right thumb   • Pain of right upper extremity   • Continuous opioid dependence ([facility])   • Severe depressed bipolar I disorder without psychotic features ([place] [address] )   • Mixed obsessional thoughts and acts   • Eating disorder, unspecified   • Chronic back pain   • Obesity, morbid ([place] [address] )      Past Medical and Surgical History:     Past Medical History:   Diagnosis Date   • Anxiety    • Anxiety disorder    • Arthritis    • At risk for falls    • Bipolar 2 disorder ([place] [address] )    • Chronic back pain    • Chronic kidney disease    • Closed fracture of distal end of right fibula with routine healing 2020   • COVID-19     in 2021   • CVA (cerebral vascular accident) [facility])     noted on MRI in the past   • Depression    • GERD (gastroesophageal reflux disease)    • Hypercholesteremia    • Hypernatremia    • Hypertension    • Hypokalemia    • Idiopathic chronic pancreatitis ([place] [address] ) 2018   • Intervertebral disc disorder with radiculopathy of lumbosacral region     resolved: 2015   • Kidney disease    • Limb alert care status     LUE-fistula   • Panic attacks    • Pericardial effusion    • PONV (postoperative nausea and vomiting)    • Psychiatric problem    • Radiculitis     resolved: 2015   • Secondary renal hyperparathyroidism ([place] [address] )    • Stroke [facility])    • Vitamin D deficiency      Past Surgical History:   Procedure Laterality Date   • BUNIONECTOMY      Left foot    • CAST APPLICATION Right     Procedure: Application short-arm thumb spica splint;  Surgeon: Dylan Agustin MD;  Location:  MAIN OR;  Service: Orthopedics   • COLON SURGERY     • COLONOSCOPY  2021   • DILATION AND CURETTAGE OF UTERUS     • INDUCED       surgically induced   • RI ANASTOMOSIS,AV,ANY SITE Left 2019    Procedure: CREATION FISTULA ARTERIOVENOUS (AV) left wrists possible left upper;  Surgeon: Lyubov Narvaez MD;  Location:  MAIN OR;  Service: Vascular   • RI CORRJ [address] W/SESMDC W/DIST Blase Josue Left 2019    Procedure: Lobo Viktor;  Surgeon: Chio Mcrae DPM;  Location:  MAIN OR; Service: Podiatry   • AR CORRJ HALLUX VALGUS W/SESMDC W/DIST Compa Virgil Right 8/3/2020    Procedure: Rayma Sing;  Surgeon: Moses Michael DPM;  Location: [facility] MAIN [facility];  Service: Podiatry   • AR CORRJ HALLUX VALGUS W/SESMDC W/PROX PHLNX OSTEOT Right 9/27/2021    Procedure: Aleda Ben, right tameka osteotomy and 2nd claw toe correction;  Surgeon: Candice Broussard MD;  Location: [facility] MAIN OR;  Service: Orthopedics   • AR ERCP DX COLLECTION SPECIMEN BRUSHING/WASHING N/A 4/11/2018    Procedure: ENDOSCOPIC RETROGRADE CHOLANGIOPANCREATOGRAPHY (ERCP); Surgeon: Jose Rodriguez MD;  Location: [facility] MAIN OR;  Service: Gastroenterology   • AR FINGER TENDON TRANSFER,2-5 FINGRS Right 6/23/2022    Procedure: Right ring finger flexor digitorum superficialis to flexor pollicis longus tendon transfer;  Surgeon: Issac Rea MD;  Location: [facility];  Service: Orthopedics   • AR LAP, SURG PROCTOPEXY N/A 7/13/2018    Procedure: ROBOTIC SIGMOID RESECTION / RECTOPEXY;  Surgeon: Marko Gongora MD;  Location: [facility];  Service: Colorectal   • AR OPEN TREATMENT RADIAL SHAFT FRACTURE Right 10/11/2021    Procedure: OPEN REDUCTION W/ INTERNAL FIXATION (ORIF) RADIUS (WRIST), RIGHT DISTAL;  Surgeon: Guicho Jeff MD;  Location: [facility];  Service: Orthopedics   • AR REMOVAL DEEP IMPLANT Right 6/23/2022    Procedure: Removal of hardware volar aspect right distal radius (distal radial plate and screws);   Surgeon: Issac Rea MD;  Location: [facility];  Service: Orthopedics   • AR SIGMOIDOSCOPY FLX DX W/COLLJ SPEC BR/WA IF PFRMD N/A 7/13/2018    Procedure: Valentine Mar;  Surgeon: Marko Gongora MD;  Location: [facility];  Service: Colorectal   • TUBAL LIGATION Bilateral 1997   • US GUIDED THYROID BIOPSY  7/30/2019      Family History:     Family History   Problem Relation Age of Onset   • Bipolar disorder Mother    • Mental illness Mother         depression   • Stroke Mother    • Dementia Mother • Colon polyps Mother    • Heart disease Father    • Hypertension Father    • Diabetes Father    • Other Family         Back disorder   • Diabetes Family    • Heart disease Family    • Hypertension Family    • Stroke Family    • Thyroid disease Family    • Breast cancer Paternal Grandmother         age unknown   • Breast cancer Paternal Aunt         age unknown   • Breast cancer Maternal Aunt         age unknown   • Mental illness Sister    • Colon polyps Sister    • Mental illness Sister    • Heart disease Sister    • No Known Problems Sister    • Breast cancer Sister 76   • Other Son         pituitary tumor   • Hypertension Son    • Obesity Son    • No Known Problems Son    • No Known Problems Maternal Grandmother    • No Known Problems Maternal Grandfather    • No Known Problems Paternal Grandfather    • Breast cancer Paternal Aunt         age unknown   • Substance Abuse Neg Hx         neg fam hx   • Colon cancer Neg Hx       Social History:     Social History     Socioeconomic History   • Marital status:      Spouse name: None   • Number of children: None   • Years of education: None   • Highest education level: None   Occupational History   • 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   Tobacco Use   • Smoking status: Never   • Smokeless tobacco: Never   Vaping Use   • Vaping Use: Never used   Substance and Sexual Activity   • Alcohol use: Never   • Drug use: Not Currently   • Sexual activity: Not Currently   Other Topics Concern   • None   Social History Narrative    Daily caffeine consumption 2-3 servings a day    Lives with family  No living will  Has dentures---no dental care  Primary language--English  Feels safe at home  Social Determinants of Health     Financial Resource Strain: Medium Risk   • Difficulty of Paying Living Expenses: Somewhat hard   Food Insecurity: Not on file   Transportation Needs: No Transportation Needs   • Lack of Transportation (Medical):  No   • Lack of Transportation (Non-Medical): No   Physical Activity: Not on file   Stress: Not on file   Social Connections: Not on file   Intimate Partner Violence: Not on file   Housing Stability: Not on file      Medications and Allergies:     Current Outpatient Medications   Medication Sig Dispense Refill   • acetaminophen (TYLENOL) 650 mg CR tablet Take 1 tablet (650 mg total) by mouth every 8 (eight) hours as needed for mild pain 30 tablet 0   • albuterol (Ventolin HFA) 90 mcg/act inhaler Inhale 2 puffs every 6 (six) hours as needed for wheezing or shortness of breath 8 5 g 3   • AMILoride 5 mg tablet Take 1 tablet (5 mg total) by mouth 2 (two) times a day 180 tablet 3   • amLODIPine (NORVASC) 5 mg tablet Take 1 tablet (5 mg total) by mouth daily 30 tablet 3   • carvedilol (COREG) 25 mg tablet Take 1 tablet (25 mg total) by mouth 2 (two) times a day with meals 180 tablet 3   • cholecalciferol (VITAMIN D3) 1,000 units tablet Take 5 tablets (5,000 Units total) by mouth daily 90 tablet 5   • clonazePAM (KlonoPIN) 0 5 mg tablet Take 1 tablet (0 5 mg total) by mouth 3 (three) times a day 270 tablet 0   • fluvoxaMINE (LUVOX) 100 mg tablet Take 1 tab in the morning, 2 tab at night 90 tablet 0   • lamoTRIgine (LaMICtal) 200 MG tablet Take 1 tablet (200 mg total) by mouth 2 (two) times a day 180 tablet 0   • naloxone (NARCAN) 4 mg/0 1 mL nasal spray Administer 1 spray into a nostril  If no response after 2-3 minutes, give another dose in the other nostril using a new spray   1 each 1   • omeprazole (PriLOSEC) 40 MG capsule Take 1 capsule (40 mg total) by mouth daily before breakfast 90 capsule 3   • ondansetron (ZOFRAN) 4 mg tablet Take 1 tablet (4 mg total) by mouth every 8 (eight) hours as needed for nausea or vomiting 60 tablet 1   • QUEtiapine (SEROquel) 100 mg tablet Take 1 tablet (100 mg total) by mouth daily at bedtime 100mg with 400mg bedtime 90 tablet 0   • QUEtiapine (SEROquel) 300 mg tablet Take 1 tablet (300 mg total) by mouth in the morning 300mg in morning 90 tablet 0   • QUEtiapine (SEROquel) 400 MG tablet Take 1 tablet (400 mg total) by mouth daily at bedtime 90 tablet 0   • rosuvastatin (CRESTOR) 20 MG tablet Take 1 tablet (20 mg total) by mouth every evening 90 tablet 3   • traMADol (Ultram) 50 mg tablet Take 1 tablet (50 mg total) by mouth every 8 (eight) hours as needed for severe pain 60 tablet 0   • aspirin 81 mg chewable tablet Chew 1 tablet (81 mg total) daily (Patient not taking: Reported on 11/28/2022)     • fluvoxaMINE (LUVOX) 100 mg tablet 1 tab am , 2 tabs HS (Patient not taking: Reported on 12/7/2022) 270 tablet 0   • linaCLOtide (Linzess) 290 MCG CAPS Take 1 capsule by mouth daily (Patient not taking: Reported on 12/7/2022) 90 capsule 0   • Polyethylene Glycol 3350 (MIRALAX PO) Take by mouth as needed  (Patient not taking: Reported on 12/7/2022)       No current facility-administered medications for this visit  Allergies   Allergen Reactions   • Pollen Extract Nasal Congestion      Immunizations:     Immunization History   Administered Date(s) Administered   • COVID-19 PFIZER VACCINE 0 3 ML IM 05/05/2021, 05/29/2021, 01/21/2022   • Influenza, recombinant, quadrivalent,injectable, preservative free 01/02/2019, 10/02/2019, 01/03/2022, 10/10/2022   • Pneumococcal Conjugate 13-Valent 08/14/2019   • Pneumococcal Polysaccharide PPV23 01/31/2022   • Tdap 08/14/2019      Health Maintenance:         Topic Date Due   • Breast Cancer Screening: Mammogram  08/17/2023   • Cervical Cancer Screening  07/21/2026   • Colorectal Cancer Screening  03/22/2031   • HIV Screening  Completed   • Hepatitis C Screening  Completed         Topic Date Due   • Hepatitis B Vaccine (1 of 3 - 3-dose series) Never done   • COVID-19 Vaccine (4 - Booster for Pfizer series) 05/21/2022      Medicare Screening Tests and Risk Assessments:     Daphne Mayen is here for her Subsequent Wellness visit  Health Risk Assessment:   Patient rates overall health as good  Patient feels that their physical health rating is same  Patient is satisfied with their life  Eyesight was rated as same  Hearing was rated as same  Patient feels that their emotional and mental health rating is slightly better  Patients states they are never, rarely angry  Patient states they are never, rarely unusually tired/fatigued  Pain experienced in the last 7 days has been a lot  Patient's pain rating has been 6/10  Patient states that she has experienced no weight loss or gain in last 6 months  Depression Screening:   PHQ-9 Score: 2      Fall Risk Screening: In the past year, patient has experienced: history of falling in past year    Number of falls: 2 or more  Injured during fall?: Yes    Feels unsteady when standing or walking?: Yes    Worried about falling?: Yes      Urinary Incontinence Screening:   Patient has not leaked urine accidently in the last six months  Home Safety:  Patient has trouble with stairs inside or outside of their home  Patient has working smoke alarms and has working carbon monoxide detector  Home safety hazards include: none  Nutrition:   Current diet is Regular  Medications:   Patient is currently taking over-the-counter supplements  OTC medications include: see medication list  Patient is able to manage medications  Activities of Daily Living (ADLs)/Instrumental Activities of Daily Living (IADLs):   Walk and transfer into and out of bed and chair?: Yes  Dress and groom yourself?: Yes    Bathe or shower yourself?: Yes    Feed yourself?  Yes  Do your laundry/housekeeping?: Yes  Manage your money, pay your bills and track your expenses?: Yes  Make your own meals?: Yes    Do your own shopping?: Yes    Previous Hospitalizations:   Any hospitalizations or ED visits within the last 12 months?: No      Advance Care Planning:   Living will: No    Advanced directive: Yes    Advanced directive counseling given: Yes    Five wishes given: Yes    End of Life Decisions reviewed with patient: Yes      Comments: Sister chris will likely be durable poa   will bring in copy of 5 wishes when completed    Cognitive Screening:   Provider or family/friend/caregiver concerned regarding cognition?: No    PREVENTIVE SCREENINGS      Cardiovascular Screening:    General: Screening Not Indicated, History Lipid Disorder and Risks and Benefits Discussed      Diabetes Screening:     General: Screening Current      Colorectal Cancer Screening:     General: Screening Current      Breast Cancer Screening:     General: Screening Current      Cervical Cancer Screening:    General: Screening Current      Osteoporosis Screening:    General: Screening Not Indicated, History Osteoporosis and Screening Current      Abdominal Aortic Aneurysm (AAA) Screening:        General: Risks and Benefits Discussed      Lung Cancer Screening:     General: Screening Not Indicated      Hepatitis C Screening:    General: Screening Current    Screening, Brief Intervention, and Referral to Treatment (SBIRT)    Screening      AUDIT-C Screenin) How often did you have a drink containing alcohol in the past year? never  2) How many drinks did you have on a typical day when you were drinking in the past year? 0  3) How often did you have 6 or more drinks on one occasion in the past year? never    AUDIT-C Score: 0  Interpretation: Score 0-2 (female): Negative screen for alcohol misuse    No results found  Physical Exam:     /78   Pulse 86   Ht 5' 7" (1 702 m)   Wt 102 kg (225 lb)   LMP  (LMP Unknown)   SpO2 98%   BMI 35 24 kg/m²     Physical Exam  Vitals and nursing note reviewed  Constitutional:       General: She is not in acute distress  HENT:      Head: Normocephalic and atraumatic  Nose: No congestion  Eyes:      General: No scleral icterus  Right eye: No discharge  Left eye: No discharge  Cardiovascular:      Rate and Rhythm: Normal rate and regular rhythm        Heart sounds: No murmur heard  Pulmonary:      Breath sounds: No wheezing or rhonchi  Abdominal:      General: There is no distension  Palpations: Abdomen is soft  Tenderness: There is no guarding  Musculoskeletal:         General: No swelling or tenderness  Cervical back: No tenderness  Comments: Left fistula in place in wrist region   Neurological:      General: No focal deficit present  Mental Status: She is alert     Psychiatric:         Mood and Affect: Mood normal           Xochilt Aurora, DO

## 2022-12-07 NOTE — PATIENT INSTRUCTIONS
Give 5 wishes form   Will call with actual dose of crestor she is taking  Medicare Preventive Visit Patient Instructions  Thank you for completing your Welcome to Medicare Visit or Medicare Annual Wellness Visit today  Your next wellness visit will be due in one year (12/8/2023)  The screening/preventive services that you may require over the next 5-10 years are detailed below  Some tests may not apply to you based off risk factors and/or age  Screening tests ordered at today's visit but not completed yet may show as past due  Also, please note that scanned in results may not display below  Preventive Screenings:  Service Recommendations Previous Testing/Comments   Colorectal Cancer Screening  * Colonoscopy    * Fecal Occult Blood Test (FOBT)/Fecal Immunochemical Test (FIT)  * Fecal DNA/Cologuard Test  * Flexible Sigmoidoscopy Age: 39-70 years old   Colonoscopy: every 10 years (may be performed more frequently if at higher risk)  OR  FOBT/FIT: every 1 year  OR  Cologuard: every 3 years  OR  Sigmoidoscopy: every 5 years  Screening may be recommended earlier than age 39 if at higher risk for colorectal cancer  Also, an individualized decision between you and your healthcare provider will decide whether screening between the ages of 74-80 would be appropriate  Colonoscopy: 03/22/2021  FOBT/FIT: Not on file  Cologuard: Not on file  Sigmoidoscopy: 07/13/2018    Screening Current     Breast Cancer Screening Age: 36 years old  Frequency: every 1-2 years  Not required if history of left and right mastectomy Mammogram: 08/17/2022    Screening Current   Cervical Cancer Screening Between the ages of 21-29, pap smear recommended once every 3 years  Between the ages of 33-67, can perform pap smear with HPV co-testing every 5 years     Recommendations may differ for women with a history of total hysterectomy, cervical cancer, or abnormal pap smears in past  Pap Smear: 07/21/2021    Screening Current   Hepatitis C Screening Once for adults born between [facility]  More frequently in patients at high risk for Hepatitis C Hep C Antibody: 03/29/2019    Screening Current   Diabetes Screening 1-2 times per year if you're at risk for diabetes or have pre-diabetes Fasting glucose: 125 mg/dL (12/1/2022)  A1C: 5 4 % (3/24/2020)  Screening Current   Cholesterol Screening Once every 5 years if you don't have a lipid disorder  May order more often based on risk factors  Lipid panel: 08/31/2022    Screening Not Indicated  History Lipid Disorder     Other Preventive Screenings Covered by Medicare:  Abdominal Aortic Aneurysm (AAA) Screening: covered once if your at risk  You're considered to be at risk if you have a family history of AAA  Lung Cancer Screening: covers low dose CT scan once per year if you meet all of the following conditions: (1) Age 50-69; (2) No signs or symptoms of lung cancer; (3) Current smoker or have quit smoking within the last 15 years; (4) You have a tobacco smoking history of at least 20 pack years (packs per day multiplied by number of years you smoked); (5) You get a written order from a healthcare provider  Glaucoma Screening: covered annually if you're considered high risk: (1) You have diabetes OR (2) Family history of glaucoma OR (3)  aged 48 and older OR (3)  American aged 72 and older  Osteoporosis Screening: covered every 2 years if you meet one of the following conditions: (1) You're estrogen deficient and at risk for osteoporosis based off medical history and other findings; (2) Have a vertebral abnormality; (3) On glucocorticoid therapy for more than 3 months; (4) Have primary hyperparathyroidism; (5) On osteoporosis medications and need to assess response to drug therapy  Last bone density test (DXA Scan): 06/16/2021  HIV Screening: covered annually if you're between the age of 12-76   Also covered annually if you are younger than 13 and older than 72 with risk factors for HIV infection  For pregnant patients, it is covered up to 3 times per pregnancy  Immunizations:  Immunization Recommendations   Influenza Vaccine Annual influenza vaccination during flu season is recommended for all persons aged >= 6 months who do not have contraindications   Pneumococcal Vaccine   * Pneumococcal conjugate vaccine = PCV13 (Prevnar 13), PCV15 (Vaxneuvance), PCV20 (Prevnar 20)  * Pneumococcal polysaccharide vaccine = PPSV23 (Pneumovax) Adults 25-60 years old: 1-3 doses may be recommended based on certain risk factors  Adults 72 years old: 1-2 doses may be recommended based off what pneumonia vaccine you previously received   Hepatitis B Vaccine 3 dose series if at intermediate or high risk (ex: diabetes, end stage renal disease, liver disease)   Tetanus (Td) Vaccine - COST NOT COVERED BY MEDICARE PART B Following completion of primary series, a booster dose should be given every 10 years to maintain immunity against tetanus  Td may also be given as tetanus wound prophylaxis  Tdap Vaccine - COST NOT COVERED BY MEDICARE PART B Recommended at least once for all adults  For pregnant patients, recommended with each pregnancy  Shingles Vaccine (Shingrix) - COST NOT COVERED BY MEDICARE PART B  2 shot series recommended in those aged 48 and above     Health Maintenance Due:      Topic Date Due    Breast Cancer Screening: Mammogram  08/17/2023    Cervical Cancer Screening  07/21/2026    Colorectal Cancer Screening  03/22/2031    HIV Screening  Completed    Hepatitis C Screening  Completed     Immunizations Due:      Topic Date Due    Hepatitis B Vaccine (1 of 3 - 3-dose series) Never done    COVID-19 Vaccine (4 - Booster for Lucio Peter series) 05/21/2022     Advance Directives   What are advance directives? Advance directives are legal documents that state your wishes and plans for medical care  These plans are made ahead of time in case you lose your ability to make decisions for yourself   Advance directives can apply to any medical decision, such as the treatments you want, and if you want to donate organs  What are the types of advance directives? There are many types of advance directives, and each state has rules about how to use them  You may choose a combination of any of the following:  Living will: This is a written record of the treatment you want  You can also choose which treatments you do not want, which to limit, and which to stop at a certain time  This includes surgery, medicine, IV fluid, and tube feedings  Durable power of  for [facility]): This is a written record that states who you want to make healthcare choices for you when you are unable to make them for yourself  This person, called a proxy, is usually a family member or a friend  You may choose more than 1 proxy  Do not resuscitate (DNR) order:  A DNR order is used in case your heart stops beating or you stop breathing  It is a request not to have certain forms of treatment, such as CPR  A DNR order may be included in other types of advance directives  Medical directive: This covers the care that you want if you are in a coma, near death, or unable to make decisions for yourself  You can list the treatments you want for each condition  Treatment may include pain medicine, surgery, blood transfusions, dialysis, IV or tube feedings, and a ventilator (breathing machine)  Values history: This document has questions about your views, beliefs, and how you feel and think about life  This information can help others choose the care that you would choose  Why are advance directives important? An advance directive helps you control your care  Although spoken wishes may be used, it is better to have your wishes written down  Spoken wishes can be misunderstood, or not followed  Treatments may be given even if you do not want them  An advance directive may make it easier for your family to make difficult choices about your care  Weight Management   Why it is important to manage your weight:  Being overweight increases your risk of health conditions such as heart disease, high blood pressure, type 2 diabetes, and certain types of cancer  It can also increase your risk for osteoarthritis, sleep apnea, and other respiratory problems  Aim for a slow, steady weight loss  Even a small amount of weight loss can lower your risk of health problems  How to lose weight safely:  A safe and healthy way to lose weight is to eat fewer calories and get regular exercise  You can lose up about 1 pound a week by decreasing the number of calories you eat by 500 calories each day  Healthy meal plan for weight management:  A healthy meal plan includes a variety of foods, contains fewer calories, and helps you stay healthy  A healthy meal plan includes the following:  Eat whole-grain foods more often  A healthy meal plan should contain fiber  Fiber is the part of grains, fruits, and vegetables that is not broken down by your body  Whole-grain foods are healthy and provide extra fiber in your diet  Some examples of whole-grain foods are whole-wheat breads and pastas, oatmeal, brown rice, and bulgur  Eat a variety of vegetables every day  Include dark, leafy greens such as spinach, kale, lea greens, and mustard greens  Eat yellow and orange vegetables such as carrots, sweet potatoes, and winter squash  Eat a variety of fruits every day  Choose fresh or canned fruit (canned in its own juice or light syrup) instead of juice  Fruit juice has very little or no fiber  Eat low-fat dairy foods  Drink fat-free (skim) milk or 1% milk  Eat fat-free yogurt and low-fat cottage cheese  Try low-fat cheeses such as mozzarella and other reduced-fat cheeses  Choose meat and other protein foods that are low in fat  Choose beans or other legumes such as split peas or lentils   Choose fish, skinless poultry (chicken or turkey), or lean cuts of red meat (beef or pork)  Before you cook meat or poultry, cut off any visible fat  Use less fat and oil  Try baking foods instead of frying them  Add less fat, such as margarine, sour cream, regular salad dressing and mayonnaise to foods  Eat fewer high-fat foods  Some examples of high-fat foods include french fries, doughnuts, ice cream, and cakes  Eat fewer sweets  Limit foods and drinks that are high in sugar  This includes candy, cookies, regular soda, and sweetened drinks  Exercise:  Exercise at least 30 minutes per day on most days of the week  Some examples of exercise include walking, biking, dancing, and swimming  You can also fit in more physical activity by taking the stairs instead of the elevator or parking farther away from stores  Ask your healthcare provider about the best exercise plan for you  © Copyright [employer] 2018 Information is for End User's use only and may not be sold, redistributed or otherwise used for commercial purposes   All illustrations and images included in CareNotes® are the copyrighted property of A D A M , Inc  or [address] [place] [address]

## 2022-12-07 NOTE — ASSESSMENT & PLAN NOTE
Lab Results   Component Value Date    EGFR 18 12/01/2022    EGFR 19 10/05/2022    EGFR 18 08/31/2022    CREATININE 2 65 (H) 12/01/2022    CREATININE 2 61 (H) 10/05/2022    CREATININE 2 74 (H) 08/31/2022   to have  Labs every 3 months with  Dr Lazarus Head- stable- for renal transplant eval

## 2022-12-07 NOTE — TELEPHONE ENCOUNTER
Patient calling back to let dr gallardo know she was taking 2-20mg tablets of the lovastatin(?)    (this was on a voicemail so hard to understand)

## 2022-12-08 ENCOUNTER — OFFICE VISIT (OUTPATIENT)
Dept: PSYCHIATRY | Facility: CLINIC | Age: 62
End: 2022-12-08

## 2022-12-08 DIAGNOSIS — F42.9 OBSESSIVE-COMPULSIVE DISORDER, UNSPECIFIED TYPE: ICD-10-CM

## 2022-12-08 DIAGNOSIS — K59.03 DRUG-INDUCED CONSTIPATION: ICD-10-CM

## 2022-12-08 DIAGNOSIS — F31.9 BIPOLAR 1 DISORDER (HCC): ICD-10-CM

## 2022-12-08 DIAGNOSIS — F41.1 GENERALIZED ANXIETY DISORDER: Primary | ICD-10-CM

## 2022-12-08 DIAGNOSIS — M79.601 PAIN OF RIGHT UPPER EXTREMITY: ICD-10-CM

## 2022-12-08 RX ORDER — FLUVOXAMINE MALEATE 100 MG
TABLET ORAL
Qty: 270 TABLET | Refills: 0 | Status: SHIPPED | OUTPATIENT
Start: 2022-12-08

## 2022-12-08 RX ORDER — QUETIAPINE FUMARATE 300 MG/1
TABLET, FILM COATED ORAL
Qty: 90 TABLET | Refills: 0 | Status: SHIPPED | OUTPATIENT
Start: 2022-12-08

## 2022-12-08 RX ORDER — LAMOTRIGINE 200 MG/1
TABLET ORAL
Qty: 180 TABLET | Refills: 0 | Status: SHIPPED | OUTPATIENT
Start: 2022-12-08

## 2022-12-08 RX ORDER — QUETIAPINE FUMARATE 100 MG/1
TABLET, FILM COATED ORAL
Qty: 90 TABLET | Refills: 0 | Status: SHIPPED | OUTPATIENT
Start: 2022-12-08

## 2022-12-08 RX ORDER — QUETIAPINE FUMARATE 400 MG/1
TABLET, FILM COATED ORAL
Qty: 90 TABLET | Refills: 0 | Status: SHIPPED | OUTPATIENT
Start: 2022-12-08

## 2022-12-08 NOTE — TELEPHONE ENCOUNTER
Lm for PT that she should only take 1 of the 20 mg of crestor and also that [facility] didn't have the tramadol script from 12/5 and we are resending it

## 2022-12-08 NOTE — PATIENT INSTRUCTIONS
Randall Vega [id number]   Thanks for presenting to today's Appointment at [facility]  Your medications were not changed

## 2022-12-08 NOTE — PSYCH
[facility]/Hospital: [address]   [address] [facility]    Psychiatric Progress Note  MRN#: [id number]  Gabriela Sena 63 y o  female    This note was not shared with the patient due to reasonable likelihood of causing patient harm   This Patient was seen in the office today at [place] [address] location  This is transfer patient of Dr Jack Castellanos:  Keron Estrada reported diagnoses of Bipolar Disorder and OCD for years,  Although, Keron Estrada complained of anxiety ;  described as feeling  " stressed,annoyed, agitated  Not sleeping through the night   through the night  Worries about living situation and relationship with sister  Sometimes SOB and feeling she has to get out ( restlessness) - due to worries   Keron Estrada also reported social stressor with sister , live with her  After mother   Lived w/ sister since , anxiety since   asked Keron Estrada to leave  Also stated some annoyance with enviornement  ROS:  Today denies having bipolar symptoms   OCD- currently repetative monitoring of the stove, re-driving around areas due to thoughts of hitting someone - time consumsing( x 2hrs)  at night sometimes during the day  Once left water on and flooded exacerbated OCD     Keron Estrada reported h/o  "eating disorder" , caloric restrictions 800, and would abuse 30 laxatives a day; was down to 100 pounds , was in hospital at [facility] due to  abnormal potassium ; dizziness, later had kidney diease stage 4          ROS: bilateral knee pain       Medication compliance to  seorquel 400mg + 300mg , Lamotrigine 200mg, Seroquel, Klonpine   Medication side effects:none        Psychiatric Hx:  Hospitalization- [address] (now closed), FRIENDS several times- was diagnosed w/ Bipolar Disorder, [address] [address] [email or web address], [place] -anorexia   SIB/SA- several SA - OD on medications- was hospitalized , once was intubated in   Med Trials: Risperidone- ineffective, Olanzapine, Lithium- effected kidney's, Zoloft , Prozac - unsure why stop , Clomprimine     Substance Hx:  ETOH- h/o drinking , black out x 2 at 14 yrs old   Denies history of DUI or seizures  Al Do reported she "was a pot head" then started to get anxiety , now without  X 20 yr  Tobacco- Salma Websterr reported smoked once     Allergies: NKDA    Medical Hx:  Kidney Disease stage 4  Asthma  HTH  H/o stroke  Osteoporosis       Current Outpatient Medications:   •  acetaminophen (TYLENOL) 650 mg CR tablet, Take 1 tablet (650 mg total) by mouth every 8 (eight) hours as needed for mild pain, Disp: 30 tablet, Rfl: 0  •  albuterol (Ventolin HFA) 90 mcg/act inhaler, Inhale 2 puffs every 6 (six) hours as needed for wheezing or shortness of breath, Disp: 8 5 g, Rfl: 3  •  AMILoride 5 mg tablet, Take 1 tablet (5 mg total) by mouth 2 (two) times a day, Disp: 180 tablet, Rfl: 3  •  amLODIPine (NORVASC) 5 mg tablet, Take 1 tablet (5 mg total) by mouth daily, Disp: 30 tablet, Rfl: 3  •  aspirin 81 mg chewable tablet, Chew 1 tablet (81 mg total) daily, Disp: , Rfl:   •  carvedilol (COREG) 25 mg tablet, Take 1 tablet (25 mg total) by mouth 2 (two) times a day with meals, Disp: 180 tablet, Rfl: 3  •  cholecalciferol (VITAMIN D3) 1,000 units tablet, Take 5 tablets (5,000 Units total) by mouth daily, Disp: 90 tablet, Rfl: 5  •  clonazePAM (KlonoPIN) 0 5 mg tablet, Take 1 tablet (0 5 mg total) by mouth 3 (three) times a day, Disp: 270 tablet, Rfl: 0  •  fluvoxaMINE (LUVOX) 100 mg tablet, Take 1 tab in the morning, 2 tab at night, Disp: 90 tablet, Rfl: 0  •  fluvoxaMINE (LUVOX) 100 mg tablet, Fluvoxamine 100m tablet in the morning ; 2 tablets at night, Disp: 270 tablet, Rfl: 0  •  lamoTRIgine (LaMICtal) 200 MG tablet, Lamotrigine 200m tablet po in the morning + 1 tablet po at night, Disp: 180 tablet, Rfl: 0  •  linaCLOtide (Linzess) 290 MCG CAPS, Take 1 capsule by mouth daily, Disp: 90 capsule, Rfl: 0  •  omeprazole (PriLOSEC) 40 MG capsule, Take 1 capsule (40 mg total) by mouth daily before breakfast, Disp: 90 capsule, Rfl: 3  •  ondansetron (ZOFRAN) 4 mg tablet, Take 1 tablet (4 mg total) by mouth every 8 (eight) hours as needed for nausea or vomiting, Disp: 60 tablet, Rfl: 1  •  QUEtiapine (SEROquel) 100 mg tablet, Quetiapine 100mg : 1 tablet + 400mg po at night, Disp: 90 tablet, Rfl: 0  •  QUEtiapine (SEROquel) 300 mg tablet, Quetiapine 300m tablet po  in morning, Disp: 90 tablet, Rfl: 0  •  QUEtiapine (SEROquel) 400 MG tablet, Quetiapine 400m tablet + 100mg tablet po at night, Disp: 90 tablet, Rfl: 0  •  rosuvastatin (CRESTOR) 20 MG tablet, Take 1 tablet (20 mg total) by mouth every evening, Disp: 90 tablet, Rfl: 3  •  traMADol (Ultram) 50 mg tablet, Take 1 tablet (50 mg total) by mouth every 8 (eight) hours as needed for severe pain, Disp: 60 tablet, Rfl: 0    Social Hx  On disability  Lives in [facility] with sister    Per External Record: Mother ( at age 80) Bipolar disorder    Mental illness     Stroke    Dementia    Colon polyps   Father ( at age 68) Heart disease    Hypertension    Diabetes   Family Other     Diabetes    Heart disease    Hypertension    Stroke    Thyroid disease   Paternal Grandmother () Breast cancer    Paternal Aunt () Breast cancer    Maternal Aunt () Breast cancer    Sister Mental illness    Colon polyps   Sister Mental illness   Sister Heart disease   Sister No Known Problems   Sister Breast cancer (77 y)   Son Other     Hypertension    Obesity   Son No Known Problems   Maternal Grandmother () No Known Problems   Maternal Grandfather () No Known Problems   Paternal Grandfather () No Known Problems   Paternal Aunt () Breast cancer    Neg Hx Substance Abuse     Colon cancer         Mental Status Evaluation:  General Appearance:  Devin Limon is a 63 y o    female casually dressed, adequate hygiene and grooming, looks stated age, Wearing glasses    Behavior:  pleasant, cooperative, calm, intermittent eye contact   Speech:  normal for rate, rhythm, volume, latency, amount   Mood:  anxious and aggravated    Affect:  normal and slight frustation   Thought Process:  circumstantial and logical   Thought Content:  no overt delusions, normal   Perceptual Disturbances: Does not appear responding or preoccupied   Delusions  No   Risk Potential: Suicidal Ideations No  Homicidal Ideations No  Potential for Aggression No     Sensorium:  Oriented to person, place, time/date and situation   Memory:  recent and remote memory grossly intact   Consciousness:  alert and awake   Attention: attention span and concentration are age appropriate   Insight:  fair   Judgment: fair   Gait/Station: normal   Motor Activity: no abnormal movements     There were no vitals filed for this visit       Medications:   Current Outpatient Medications on File Prior to Visit   Medication Sig Dispense Refill   • acetaminophen (TYLENOL) 650 mg CR tablet Take 1 tablet (650 mg total) by mouth every 8 (eight) hours as needed for mild pain 30 tablet 0   • albuterol (Ventolin HFA) 90 mcg/act inhaler Inhale 2 puffs every 6 (six) hours as needed for wheezing or shortness of breath 8 5 g 3   • AMILoride 5 mg tablet Take 1 tablet (5 mg total) by mouth 2 (two) times a day 180 tablet 3   • amLODIPine (NORVASC) 5 mg tablet Take 1 tablet (5 mg total) by mouth daily 30 tablet 3   • aspirin 81 mg chewable tablet Chew 1 tablet (81 mg total) daily (Patient not taking: Reported on 11/28/2022)     • carvedilol (COREG) 25 mg tablet Take 1 tablet (25 mg total) by mouth 2 (two) times a day with meals 180 tablet 3   • cholecalciferol (VITAMIN D3) 1,000 units tablet Take 5 tablets (5,000 Units total) by mouth daily 90 tablet 5   • clonazePAM (KlonoPIN) 0 5 mg tablet Take 1 tablet (0 5 mg total) by mouth 3 (three) times a day 270 tablet 0   • fluvoxaMINE (LUVOX) 100 mg tablet Take 1 tab in the morning, 2 tab at night 90 tablet 0   • fluvoxaMINE (LUVOX) 100 mg tablet 1 tab am , 2 tabs HS (Patient not taking: Reported on 12/7/2022) 270 tablet 0   • lamoTRIgine (LaMICtal) 200 MG tablet Take 1 tablet (200 mg total) by mouth 2 (two) times a day 180 tablet 0   • linaCLOtide (Linzess) 290 MCG CAPS Take 1 capsule by mouth daily (Patient not taking: Reported on 12/7/2022) 90 capsule 0   • naloxone (NARCAN) 4 mg/0 1 mL nasal spray Administer 1 spray into a nostril  If no response after 2-3 minutes, give another dose in the other nostril using a new spray  1 each 1   • omeprazole (PriLOSEC) 40 MG capsule Take 1 capsule (40 mg total) by mouth daily before breakfast 90 capsule 3   • ondansetron (ZOFRAN) 4 mg tablet Take 1 tablet (4 mg total) by mouth every 8 (eight) hours as needed for nausea or vomiting 60 tablet 1   • Polyethylene Glycol 3350 (MIRALAX PO) Take by mouth as needed  (Patient not taking: Reported on 12/7/2022)     • QUEtiapine (SEROquel) 100 mg tablet Take 1 tablet (100 mg total) by mouth daily at bedtime 100mg with 400mg bedtime 90 tablet 0   • QUEtiapine (SEROquel) 300 mg tablet Take 1 tablet (300 mg total) by mouth in the morning 300mg in morning 90 tablet 0   • QUEtiapine (SEROquel) 400 MG tablet Take 1 tablet (400 mg total) by mouth daily at bedtime 90 tablet 0   • rosuvastatin (CRESTOR) 20 MG tablet Take 1 tablet (20 mg total) by mouth every evening 90 tablet 3   • traMADol (Ultram) 50 mg tablet Take 1 tablet (50 mg total) by mouth every 8 (eight) hours as needed for severe pain 60 tablet 0   • [DISCONTINUED] methylPREDNISolone 4 MG tablet therapy pack Use as directed on package 21 tablet 0     No current facility-administered medications on file prior to visit  Labs: I have personally reviewed all pertinent laboratory/tests results     Most Recent Labs:   Lab Results   Component Value Date    WBC 7 07 12/01/2022    RBC 3 73 (L) 12/01/2022    HGB 11 8 12/01/2022    HCT 40 1 12/01/2022     12/01/2022    RDW 12 9 12/01/2022    NEUTROABS 3 65 03/04/2022    SODIUM 140 12/01/2022    K 4 7 12/01/2022     (H) 12/01/2022    CO2 24 12/01/2022    BUN 35 (H) 12/01/2022    CREATININE 2 65 (H) 12/01/2022    GLUC 116 03/04/2022    GLUF 125 (H) 12/01/2022    CALCIUM 10 2 (H) 12/01/2022    AST 28 12/01/2022    ALT 39 12/01/2022    ALKPHOS 179 (H) 12/01/2022    TP 7 7 12/01/2022    ALB 3 6 12/01/2022    TBILI 0 29 12/01/2022    CHOLESTEROL 263 (H) 08/31/2022    HDL 48 (L) 08/31/2022    TRIG 358 (H) 08/31/2022    LDLCALC 143 (H) 08/31/2022    [place] 215 08/31/2022    AMMONIA 17 02/17/2018    XLQ3LRLLFRHJ 2 890 05/26/2021    FREET4 0 57 (L) 06/12/2019    HGBA1C 5 4 03/24/2020     03/24/2020     ________________________________________________________________________    A/P  Daisy Pendleton is a 72-year-old  female, history of multiple hospitalizations, several suicide attempts, bipolar disorder,  OCD, and anorexia who presented with generalized anxiety due to situational stressors linked to home environment  There were several symptoms suggestive of GARRET  Case is complicated by ongoing OCD compulsive type regarding repetitive behaviors and several failed medication trials  Presented on high-dose Seroquel, lamotrigine and Luvox  DSM5  Generalized anxiety disorder  Obsessive-compulsive disorder, compulsive   History of Bipolar 1 disorder ([address] )        PLAN:    Discussed diagnostic impression based on clinical findings, history and external records reviewed  Daisy Pendleton has several prior medication trials, plans on next appointment [facility] to gather more history regarding medication trials prior to prescribing another drug or dose adjustment  · Seroquel 300 in the morning, 500 at night  · Lamotrigine 200 mg twice daily  · Luvox 100 mg in the morning and 200 at night         Risks, benefits, and possible side effects of medications explained to patient and patient verbalizes understanding  Next Appointment: 4 wks       Today's Appointment@ [facility]  Face To Face:  9:12 AM -10:02 AM;Documentation Time:  4:30 PM- 4:48 PM    Encounter Duration: Time Spent 50 minutes with Patient  Greater than 50% of total time was spent with the patient

## 2022-12-09 RX ORDER — TRAMADOL HYDROCHLORIDE 50 MG/1
50 TABLET ORAL EVERY 8 HOURS PRN
Qty: 60 TABLET | Refills: 0 | OUTPATIENT
Start: 2022-12-09

## 2022-12-12 ENCOUNTER — OFFICE VISIT (OUTPATIENT)
Dept: PHYSICAL THERAPY | Facility: CLINIC | Age: 62
End: 2022-12-12

## 2022-12-12 ENCOUNTER — TELEPHONE (OUTPATIENT)
Dept: NEPHROLOGY | Facility: CLINIC | Age: 62
End: 2022-12-12

## 2022-12-12 DIAGNOSIS — M79.605 LEG PAIN, BILATERAL: ICD-10-CM

## 2022-12-12 DIAGNOSIS — M54.41 CHRONIC BILATERAL LOW BACK PAIN WITH BILATERAL SCIATICA: Primary | ICD-10-CM

## 2022-12-12 DIAGNOSIS — G89.29 CHRONIC BILATERAL LOW BACK PAIN WITH BILATERAL SCIATICA: Primary | ICD-10-CM

## 2022-12-12 DIAGNOSIS — M79.604 LEG PAIN, BILATERAL: ICD-10-CM

## 2022-12-12 DIAGNOSIS — M54.42 CHRONIC BILATERAL LOW BACK PAIN WITH BILATERAL SCIATICA: Primary | ICD-10-CM

## 2022-12-12 DIAGNOSIS — R29.898 WEAKNESS OF BOTH LOWER EXTREMITIES: ICD-10-CM

## 2022-12-12 NOTE — TELEPHONE ENCOUNTER
Appointment Confirmation   Person confirmed appointment with  If not patient, name of the person Answering Machine    Date and time of appointment 12/13    Patient acknowledged and will be at appointment? no   Did you advise the patient that they will need a urine sample if they are a new patient? no   Did you advise the patient to bring their current medications for verification? (including any OTC) yes   Additional Information

## 2022-12-12 NOTE — PROGRESS NOTES
Daily Note     Today's date: 2022  Patient name: Aldo Muñoz  : 1960  MRN: [id number]  Referring provider: Triston Nguyen DO  Dx:   Encounter Diagnosis     ICD-10-CM    1  Chronic bilateral low back pain with bilateral sciatica  M54 42     M54 41     G89 29       2  Leg pain, bilateral  M79 604     M79 605       3  Weakness of both lower extremities  [id number]                      Subjective: Notes feeling okay overall  Continues to have increased knee and hip discomfort R sided as well as gluteal discomfort  Objective: See treatment diary below      Assessment: Continues to have + response to self stretching and warm up routine for R glute and piriformis mms  Able to perform PPT today with marching and ambulation with improved control and recruitment  Likely with flexion bias and PPT will reduce LE cramping and NT sxs  Plan: Continue per plan of care  Precautions: Fall risk      Manuals 10/17 10/24 11/2 11/7 11/28 12/2 12/5 12/12     LE PROM  WE CM WE PF PF WE PF     STM to L/S  WE CM WE PF        Re-assessment      PF                     Neuro Re-Ed             PPT 10x supine x10 3"x10 5"x10 STD 5"x10 5x10" std  10"x5  std 10x5"     STD PPT with march 10x STD x10 x10 x10 ea 20x 20x 20x 20x     Supine NSP marching 10x ea x10 3"x10 ea  3"x10 3"x10 NV  2x10      Supine NSP heel slides    x10 ea NV NV       Marching with mirror feedback and PPT     20x 20x 20x 20x     TM walk with PPT     5 min  5 min  5 min 5 min      Std star excursion      10x ea 10x ea 10x ea 10x ea     Ther Ex             SLR x 3  x10 ea x10 ea    x10 ea         Clamshells    x10 ea         Bridges    x10   x10      Pball Lumbar Flx stretch    x20         Seated HS stretch    5"x10 ea  3x15' ea 15"x3 ea      Sit to stands      3x5 with bar support 10x2 with bar support 2x10     Slant RDL        2x10     Step ups      8" 10x ea // bars 8" 10x ea // bars 8" 2x10      Slant HR/TR      20x ea 20x ea 20x Ther Activity   11/2          Bike   patient refused   5 min  5 min      TM walking             Gait Training                                       Modalities

## 2022-12-13 ENCOUNTER — OFFICE VISIT (OUTPATIENT)
Dept: NEPHROLOGY | Facility: CLINIC | Age: 62
End: 2022-12-13

## 2022-12-13 VITALS
HEIGHT: 67 IN | WEIGHT: 224 LBS | BODY MASS INDEX: 35.16 KG/M2 | SYSTOLIC BLOOD PRESSURE: 144 MMHG | DIASTOLIC BLOOD PRESSURE: 60 MMHG

## 2022-12-13 DIAGNOSIS — Z01.818 PRE-TRANSPLANT EVALUATION FOR CKD (CHRONIC KIDNEY DISEASE): Primary | ICD-10-CM

## 2022-12-13 DIAGNOSIS — F31.81 BIPOLAR 2 DISORDER (HCC): Chronic | ICD-10-CM

## 2022-12-13 RX ORDER — CLONAZEPAM 0.5 MG/1
0.5 TABLET ORAL 3 TIMES DAILY
Qty: 270 TABLET | Refills: 0 | Status: SHIPPED | OUTPATIENT
Start: 2022-12-26

## 2022-12-13 NOTE — PATIENT INSTRUCTIONS
1) We will review your case at the transplant committee meeting and will review our decision with you in approximately 4 weeks over the phone  2) If you do not receive a call from [facility] within 4 weeks, please call our local Nephrology office  3) From a renal transplant evaluation purpose, we will see you once a year in our local office for medical follow-up  4) Once we call you with our decision regarding further evaluation, you will receive further instruction over the phone and in the mail regarding the next steps to complete the transplant evaluation

## 2022-12-13 NOTE — PROGRESS NOTES
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, vol depletion, HTN), echogenic kidneys, bipolar disease, eating disorder, R kidney cyst, HTN (diagnosed ), history of CVA, chronic pancreatitis, disability (worked at [employer]), rectal prolapse surgery s/p surgery , seen in the Nephrology Clinic for pre-transplant kidney evaluation  ECHO - 2022 -ejection fraction 54%, grade 1 diastolic dysfunction, mild aortic stenosis, mild mitral annular calcification, mild tricuspid regurgitation    Plan   1  Patient's case will be presented to the transplant committee meeting  Patient appears to be a suitable candidate to be evaluated further but will need evaluation with transplant psychiatry team   Also on interview today, patient is describing dyspnea on exertion requiring the patient to sit down [facility] through climbing 1 flight of stairs and also had to sit down to rest due to exertional dyspnea at the store yesterday  Therefore I have reached out to the patient's cardiologist and primary care teams to inform of these concerns as the patient may need to be seen sooner  2  The need to avoid blood transfusion to decrease allosensitization was explained to the patient  3  The advantages of a living donor over a  donor was explained to the patient and family  I strongly encouraged the patient and family to pursue a living donor  4  Simple cysts b/l on u/s 2018  Repeat CT imagings without cysts mentioned most recent 2022  There is exophytic nodule left lower pole  5  History of Bipolar disorder - on lithium prior (discontinued in  and was on for 5 years); now on seroquol; no recent hospitalizations  Has eating disorder but has not used laxatives since 2017    6  BMI 35  7  Arthritis - knee pain  In physical therapy  8   NAIK - progressing over past 6 months  9  Cardiology clearance - pt saw Dr Aubrey Drummond from Cardiology 2022 - mild aortic stenosis  10  Multiple fall risk - pt states is 'clumsy' - had wrist fracture   11  Colonoscopy 3/2021 - had sessile polyps and internal hemorrhoids  Due to update in   12  Pap smear and Mammogram - will need records  13  NAIK - pt states that there is NAIK for 5-6 mo, and intermittent chest pain  Pt states that chest pain may occur if becomes anxious  The chest pain is not related to NAIK  Now, is having to sit down half way thru climbing flight of stairs  I have messaged primary nephrologist, PCP, and cardiologist to review  14  Transplant Psychiatry evaluation -     I have discussed with Jersey Gonzales and family at length about the risk and benefits of kidney transplantation  I have strongly encouraged her to pursue living donation, and explained to her the benefits of living donation over  donor transplantation  We have briefly discussed the surgical procedure  We have discussed about the need for life long immunosuppression and the importance of compliance  We have discussed the side effects of immunosuppression including but not limited to infection, malignancies and developing/worsening diabetes control  Jersey Gonzales verbalized understanding and is interested in pursuing transplant  During this visit, I spent 60 minutes with the patient; more than 50% of the time I counseled the patient regarding the risks and benefits of kidney transplantation, the immediate and long-term complications of kidney transplantation, and the advantage of receiving a living donor kidney transplant  It was a pleasure evaluating your patient in the office today  Thank you for allowing our team to participate in the care of Ms Jersey Gonzales  Please do not hesitate to contact our team if further issues/questions shall arise in the interim  HISTORY OF PRESENT ILLNESS    Jersey Gonzales is a 63 y o  female seen in the Nephrology Clinic for evaluation for kidney transplant  CKD lithium/HTN/vol depletion (eating disorder utilizing laxative)     Renal disease is not biopsy proven  Primary Nephrologist is Dr Cintia Guerrero  Native Urine Output: yes  Hx of voiding difficulty: no  Hx of hematuria: no  Hx of proteinuria: yes (UPCR 0 47 gm/gm)  Hx of nephrolithiasis: no  Hx of recurrent urinary tract infection: no    HTN: onset 2017,  hx of malignant HTN: no, admission for HTN emergencies: no    DM type n/a    PAD/PVD: no, Claudication: no  Cardiac history - CAD: no, MI: no     Primary Cardiologist: Dr Gayle Galvin    Exercise tolerance: physical therapy    Hx of CVA: yes    Hx of DVT/PE: no    Viral Infection:    HIV: no  Hep B: no  Hep C: no    Cancer: History of cancer: no    Health maintenance and other pertinent history:    Female:    Colonoscopy: completed 3/2021  PAPs: Yes  Mammogram: yes    GYN History:    4 pregnancies - 2 children; 1 elective ; 1 miscarriage     Hx of pre-eclampsia: no  Hx of gestational diabetes: no  Hx of recurrent fetal loss: no    Blood transfusion: no    Last admission none recent    Review Of Systems:     Constitutional: nl appetite  no fevers, chills, involuntary weight gain or weight loss  Eyes: no eye disease, double vision  ENT: no ear disease, epistaxis or oral ulcers  Respiratory: + NAIK  Cardiovascular: feels chest discomfort when feeling anxious  GI: no N/V/D/C, abdominal pain, melena, BRBPR  : see HPI  Endocrine: no thyroid disease  Heme: no bleeding or clotting disorders or swollen lymph nodes  MS: no joint effusions or deformities    Skin: no skin disease  Neuro: no HA, seizures, numbness, tingling, focal weakness  Psych: bipolar    Lives with sister  - pet dog    Employment history: currently not working; worked for [employer] as [employer]: n/a  Abdominal Surgeries: had prolapsed rectum surgery 2018    Smoke: none  ETOH: no  Drugs: no IV drugs    Past Medical History:   Diagnosis Date   • Anxiety    • Anxiety disorder    • Arthritis    • At risk for falls    • Bipolar 2 disorder (HCC)    • Chronic back pain    • Chronic kidney disease    • Closed fracture of distal end of right fibula with routine healing 2020   • COVID-19     in 2021   • CVA (cerebral vascular accident) [facility])     noted on MRI in the past   • Depression    • GERD (gastroesophageal reflux disease)    • Hypercholesteremia    • Hypernatremia    • Hypertension    • Hypokalemia    • Idiopathic chronic pancreatitis ([facility] ) 2018   • Intervertebral disc disorder with radiculopathy of lumbosacral region     resolved: 2015   • Kidney disease    • Limb alert care status     LUE-fistula   • Panic attacks    • Pericardial effusion    • PONV (postoperative nausea and vomiting)    • Psychiatric problem    • Radiculitis     resolved: 2015   • Secondary renal hyperparathyroidism ([facility] )    • Stroke [facility])    • Vitamin D deficiency      Past Surgical History:   Procedure Laterality Date   • BUNIONECTOMY      Left foot    • CAST APPLICATION Right     Procedure: Application short-arm thumb spica splint;  Surgeon: Petros Montesinos MD;  Location: [facility];  Service: Orthopedics   • COLON SURGERY     • COLONOSCOPY  2021   • DILATION AND CURETTAGE OF UTERUS     • INDUCED       surgically induced   • NY ANASTOMOSIS,AV,ANY SITE Left 2019    Procedure: CREATION FISTULA ARTERIOVENOUS (AV) left wrists possible left upper;  Surgeon: Tim Rincon MD;  Location: [facility];  Service: Vascular   • [place] CORRJ [address] W/SESMDC W/DIST Parviz Dalton Left 2019    Procedure: Ryan Abreu;  Surgeon: Thomas Aguirre DPM;  Location[facility];  Service: Podiatry   • [place] CORRJ HALLUX VALGUS W/SESMDC W/DIST Parvizverito Dalton Right 8/3/2020    Procedure: Melissa Meneses;  Surgeon: Thomas Aguirre DPM;  Location: [facility];  Service: Podiatry   • [place] CORRJ HALLUX VALGUS W/SESMDC W/PROX PHLNX OSTEOT Right 9/27/2021    Procedure: Peter Divine, right tameka osteotomy and 2nd claw toe correction;  Surgeon: Felton Kohler MD;  Location: [facility];  Service: Orthopedics   • SC ERCP DX COLLECTION SPECIMEN BRUSHING/WASHING N/A 4/11/2018    Procedure: ENDOSCOPIC RETROGRADE CHOLANGIOPANCREATOGRAPHY (ERCP); Surgeon: Shannon Wang MD;  Location[facility];  Service: Gastroenterology   • SC FINGER TENDON TRANSFER,2-5 FINGRS Right 6/23/2022    Procedure: Right ring finger flexor digitorum superficialis to flexor pollicis longus tendon transfer;  Surgeon: Megha Treviño MD;  Location: [facility];  Service: Orthopedics   • SC LAP, SURG PROCTOPEXY N/A 7/13/2018    Procedure: ROBOTIC SIGMOID RESECTION / RECTOPEXY;  Surgeon: Beena Lam MD;  Location[facility];  Service: Colorectal   • SC OPEN TREATMENT RADIAL SHAFT FRACTURE Right 10/11/2021    Procedure: OPEN REDUCTION W/ INTERNAL FIXATION (ORIF) RADIUS (WRIST), RIGHT DISTAL;  Surgeon: Suzi Knox MD;  Location: [facility];  Service: Orthopedics   • SC REMOVAL DEEP IMPLANT Right 6/23/2022    Procedure: Removal of hardware volar aspect right distal radius (distal radial plate and screws);   Surgeon: Megha Treviño MD;  Location: [facility];  Service: Orthopedics   • SC SIGMOIDOSCOPY FLX DX W/COLLJ SPEC BR/WA IF PFRMD N/A 7/13/2018    Procedure: Taylor Hunt;  Surgeon: Beena Lam MD;  [facility];  Service: Colorectal   • TUBAL LIGATION Bilateral 1997   • [address] THYROID BIOPSY  7/30/2019       Family History   Problem Relation Age of Onset   • Bipolar disorder Mother    • Mental illness Mother         depression   • Stroke Mother    • Dementia Mother    • Colon polyps Mother    • Heart disease Father    • Hypertension Father    • Diabetes Father    • Other Family         Back disorder   • Diabetes Family    • Heart disease Family    • Hypertension Family    • Stroke Family    • Thyroid disease Family    • Breast cancer Paternal Grandmother         age unknown   • Breast cancer Paternal Aunt         age unknown   • Breast cancer Maternal Aunt         age unknown   • Mental illness Sister    • Colon polyps Sister    • Mental illness Sister    • Heart disease Sister    • No Known Problems Sister    • Breast cancer Sister 76   • Other Son         pituitary tumor   • Hypertension Son    • Obesity Son    • No Known Problems Son    • No Known Problems Maternal Grandmother    • No Known Problems Maternal Grandfather    • No Known Problems Paternal Grandfather    • Breast cancer Paternal Aunt         age unknown   • Substance Abuse Neg Hx         neg fam hx   • Colon cancer Neg Hx      Social History     Socioeconomic History   • Marital status:      Spouse name: None   • Number of children: None   • Years of education: None   • Highest education level: None   Occupational History   • 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   Tobacco Use   • Smoking status: Never   • Smokeless tobacco: Never   Vaping Use   • Vaping Use: Never used   Substance and Sexual Activity   • Alcohol use: Never   • Drug use: Not Currently   • Sexual activity: Not Currently   Other Topics Concern   • None   Social History Narrative    Daily caffeine consumption 2-3 servings a day    Lives with family  No living will  Has dentures---no dental care  Primary language--English  Feels safe at home  Social Determinants of Health     Financial Resource Strain: Medium Risk   • Difficulty of Paying Living Expenses: Somewhat hard   Food Insecurity: Not on file   Transportation Needs: No Transportation Needs   • Lack of Transportation (Medical): No   • Lack of Transportation (Non-Medical):  No   Physical Activity: Not on file   Stress: Not on file   Social Connections: Not on file   Intimate Partner Violence: Not on file   Housing Stability: Not on file         Living donors:  has not discussed with family    Current Outpatient Medications   Medication Sig Dispense Refill   • acetaminophen (TYLENOL) 650 mg CR tablet Take 1 tablet (650 mg total) by mouth every 8 (eight) hours as needed for mild pain 30 tablet 0   • albuterol (Ventolin HFA) 90 mcg/act inhaler Inhale 2 puffs every 6 (six) hours as needed for wheezing or shortness of breath 8 5 g 3   • AMILoride 5 mg tablet Take 1 tablet (5 mg total) by mouth 2 (two) times a day 180 tablet 3   • amLODIPine (NORVASC) 5 mg tablet Take 1 tablet (5 mg total) by mouth daily 30 tablet 3   • aspirin 81 mg chewable tablet Chew 1 tablet (81 mg total) daily     • carvedilol (COREG) 25 mg tablet Take 1 tablet (25 mg total) by mouth 2 (two) times a day with meals 180 tablet 3   • cholecalciferol (VITAMIN D3) 1,000 units tablet Take 5 tablets (5,000 Units total) by mouth daily 90 tablet 5   • clonazePAM (KlonoPIN) 0 5 mg tablet Take 1 tablet (0 5 mg total) by mouth 3 (three) times a day 270 tablet 0   • fluvoxaMINE (LUVOX) 100 mg tablet Take 1 tab in the morning, 2 tab at night 90 tablet 0   • lamoTRIgine (LaMICtal) 200 MG tablet Lamotrigine 200m tablet po in the morning + 1 tablet po at night 180 tablet 0   • omeprazole (PriLOSEC) 40 MG capsule Take 1 capsule (40 mg total) by mouth daily before breakfast 90 capsule 3   • ondansetron (ZOFRAN) 4 mg tablet Take 1 tablet (4 mg total) by mouth every 8 (eight) hours as needed for nausea or vomiting 60 tablet 1   • QUEtiapine (SEROquel) 100 mg tablet Quetiapine 100mg : 1 tablet + 400mg po at night 90 tablet 0   • QUEtiapine (SEROquel) 300 mg tablet Quetiapine 300m tablet po  in morning 90 tablet 0   • QUEtiapine (SEROquel) 400 MG tablet Quetiapine 400m tablet + 100mg tablet po at night 90 tablet 0   • rosuvastatin (CRESTOR) 20 MG tablet Take 1 tablet (20 mg total) by mouth every evening 90 tablet 3   • traMADol (Ultram) 50 mg tablet Take 1 tablet (50 mg total) by mouth every 8 (eight) hours as needed for severe pain 60 tablet 0   • fluvoxaMINE (LUVOX) 100 mg tablet Fluvoxamine 100m tablet in the morning ; 2 tablets at night (Patient not taking: Reported on 2022) 270 tablet 0   • linaCLOtide (Linzess) 290 MCG CAPS Take 1 capsule by mouth daily (Patient not taking: Reported on 2022) 90 capsule 0     No current facility-administered medications for this visit       Pollen extract    Physical Exam:    Ht 5' 7" (1 702 m)   Wt 102 kg (224 lb)   LMP  (LMP Unknown)   BMI 35 08 kg/m²     General : NAD    HEENT: anicteric  Cardiac:  RRR, S1, S2 normal,  soft murmur    Lung:  CTAB   Abdomen:  soft, nontender, no ascites   no edema   Neuro:no focal neuro deficits   Skin: tattoo no  Carotid bruit: no

## 2022-12-13 NOTE — LETTER
December 13, 2022     [facility], [address]  [address] [address]    Patient: Jersey Gonzales   YOB: 1960   Date of Visit: 12/13/2022       Dear Dr Malika Bowers: Thank you for referring Jennifer Hart to me for evaluation  Below are my notes for this consultation  If you have questions, please do not hesitate to call me  I look forward to following your patient along with you  Sincerely,        Nicole Sutherland MD        CC: Jeff Degroots, DO  Nicole Sutherland MD  12/13/2022 12:21 PM  Sign when Signing Visit  Assessment     Jersey Gonzales is a 63 y o  female  referred by Dr Taya Gill, PMHx of CKD IV with Creat 2 65 mg/dL with eGFR 18, (native disease possible lithium induced disease, vol depletion, HTN), echogenic kidneys, bipolar disease, eating disorder, R kidney cyst, HTN (diagnosed 2017), history of CVA, chronic pancreatitis, disability (worked at [employer]), rectal prolapse surgery s/p surgery 2018, seen in the Nephrology Clinic for pre-transplant kidney evaluation  ECHO - 2/2022 -ejection fraction 04%, grade 1 diastolic dysfunction, mild aortic stenosis, mild mitral annular calcification, mild tricuspid regurgitation    Plan   1  Patient's case will be presented to the transplant committee meeting  Patient appears to be a suitable candidate to be evaluated further but will need evaluation with transplant psychiatry team   Also on interview today, patient is describing dyspnea on exertion requiring the patient to sit down custodial through climbing 1 flight of stairs and also had to sit down to rest due to exertional dyspnea at the store yesterday  Therefore I have reached out to the patient's cardiologist and primary care teams to inform of these concerns as the patient may need to be seen sooner  2  The need to avoid blood transfusion to decrease allosensitization was explained to the patient    3  The advantages of a living donor over a  donor was explained to the patient and family  I strongly encouraged the patient and family to pursue a living donor  4  Simple cysts b/l on u/s 2018  Repeat CT imagings without cysts mentioned most recent 2022  There is exophytic nodule left lower pole  5  History of Bipolar disorder - on lithium prior (discontinued in  and was on for 5 years); now on seroquol; no recent hospitalizations  Has eating disorder but has not used laxatives since 2017    6  BMI 35  7  Arthritis - knee pain  In physical therapy  8  NAIK - progressing over past 6 months  9  Cardiology clearance - pt saw Dr Nataliia Stokes from Cardiology 2022 - mild aortic stenosis  10  Multiple fall risk - pt states is 'clumsy' - had wrist fracture   11  Colonoscopy 3/2021 - had sessile polyps and internal hemorrhoids  Due to update in   12  Pap smear and Mammogram - will need records  13  NAIK - pt states that there is NAIK for 5-6 mo, and intermittent chest pain  Pt states that chest pain may occur if becomes anxious  The chest pain is not related to NAIK  Now, is having to sit down half way thru climbing flight of stairs  I have messaged primary nephrologist, PCP, and cardiologist to review  14  Transplant Psychiatry evaluation -     I have discussed with Radha Hahn and family at length about the risk and benefits of kidney transplantation  I have strongly encouraged her to pursue living donation, and explained to her the benefits of living donation over  donor transplantation  We have briefly discussed the surgical procedure  We have discussed about the need for life long immunosuppression and the importance of compliance  We have discussed the side effects of immunosuppression including but not limited to infection, malignancies and developing/worsening diabetes control  Radha Selma verbalized understanding and is interested in pursuing transplant      During this visit, I spent 60 minutes with the patient; more than 50% of the time I counseled the patient regarding the risks and benefits of kidney transplantation, the immediate and long-term complications of kidney transplantation, and the advantage of receiving a living donor kidney transplant  It was a pleasure evaluating your patient in the office today  Thank you for allowing our team to participate in the care of Ms Tresa Lay  Please do not hesitate to contact our team if further issues/questions shall arise in the interim  HISTORY OF PRESENT ILLNESS    Tresa Lay is a 63 y o  female seen in the Nephrology Clinic for evaluation for kidney transplant  CKD lithium/HTN/vol depletion (eating disorder utilizing laxative)     Renal disease is not biopsy proven  Primary Nephrologist is Dr Geovani Stewart  Native Urine Output: yes  Hx of voiding difficulty: no  Hx of hematuria: no  Hx of proteinuria: yes (UPCR 0 47 gm/gm)  Hx of nephrolithiasis: no  Hx of recurrent urinary tract infection: no    HTN: onset ,  hx of malignant HTN: no, admission for HTN emergencies: no    DM type n/a    PAD/PVD: no, Claudication: no  Cardiac history - CAD: no, MI: no     Primary Cardiologist: Dr Rafa Castro    Exercise tolerance: physical therapy    Hx of CVA: yes    Hx of DVT/PE: no    Viral Infection:    HIV: no  Hep B: no  Hep C: no    Cancer: History of cancer: no    Health maintenance and other pertinent history:    Female:    Colonoscopy: completed 3/2021  PAPs: Yes  Mammogram: yes    GYN History:    4 pregnancies - 2 children; 1 elective ; 1 miscarriage     Hx of pre-eclampsia: no  Hx of gestational diabetes: no  Hx of recurrent fetal loss: no    Blood transfusion: no    Last admission none recent    Review Of Systems:     Constitutional: nl appetite  no fevers, chills, involuntary weight gain or weight loss  Eyes: no eye disease, double vision  ENT: no ear disease, epistaxis or oral ulcers    Respiratory: + NAIK  Cardiovascular: feels chest discomfort when feeling anxious  GI: no N/V/D/C, abdominal pain, melena, BRBPR  : see HPI  Endocrine: no thyroid disease  Heme: no bleeding or clotting disorders or swollen lymph nodes  MS: no joint effusions or deformities    Skin: no skin disease  Neuro: no HA, seizures, numbness, tingling, focal weakness  Psych: bipolar    Lives with sister  - pet dog    Employment history: currently not working; worked for [employer] as [employer]: n/a  Abdominal Surgeries: had prolapsed rectum surgery     Smoke: none  ETOH: no  Drugs: no IV drugs    Past Medical History:   Diagnosis Date   • Anxiety    • Anxiety disorder    • Arthritis    • At risk for falls    • Bipolar 2 disorder ([facility] [address] [facility] )    • Chronic back pain    • Chronic kidney disease    • Closed fracture of distal end of right fibula with routine healing 2020   • COVID-19     in 2021   • CVA (cerebral vascular accident) ([facility] )     noted on MRI in the past   • Depression    • GERD (gastroesophageal reflux disease)    • Hypercholesteremia    • Hypernatremia    • Hypertension    • Hypokalemia    • Idiopathic chronic pancreatitis ([facility] ) 2018   • Intervertebral disc disorder with radiculopathy of lumbosacral region     resolved: 2015   • Kidney disease    • Limb alert care status     LUE-fistula   • Panic attacks    • Pericardial effusion    • PONV (postoperative nausea and vomiting)    • Psychiatric problem    • Radiculitis     resolved: 2015   • Secondary renal hyperparathyroidism ([facility] [address] )    • Stroke [facility])    • Vitamin D deficiency      Past Surgical History:   Procedure Laterality Date   • BUNIONECTOMY      Left foot    • CAST APPLICATION Right 3/11/0040    Procedure: Application short-arm thumb spica splint;  Surgeon: Leigh Haddad MD;  Location:  MAIN OR;  Service: Orthopedics   • COLON SURGERY     • COLONOSCOPY  2021   • DILATION AND CURETTAGE OF UTERUS     • INDUCED       surgically induced   • TX ANASTOMOSIS,AV,ANY SITE Left 11/18/2019    Procedure: CREATION FISTULA ARTERIOVENOUS (AV) left wrists possible left upper;  Surgeon: Oscar Knox MD;  Location: [facility] MAIN [facility];  Service: Vascular   • Piroska U  97  W/SESMDC W/DIST Moro Quest Left 7/1/2019    Procedure: Bel Reid;  Surgeon: Adele Solomon DPM;  Location: [facility];  Service: Podiatry   • Piroska U  97  W/SESMDC W/DIST [facility] Right 8/3/2020    Procedure: Rigo Mann;  Surgeon: Adele Solomon DPM;  Location: [facility];  Service: Podiatry   • TX CORRJ HALLUX VALGUS W/SESMDC W/PROX PHLNX OSTEOT Right 9/27/2021    Procedure: Everett Ladi, right tameka osteotomy and 2nd claw toe correction;  Surgeon: Monica Amin MD;  Location: [facility];  Service: Orthopedics   • TX ERCP DX COLLECTION SPECIMEN BRUSHING/WASHING N/A 4/11/2018    Procedure: ENDOSCOPIC RETROGRADE CHOLANGIOPANCREATOGRAPHY (ERCP); Surgeon: Mariangel Howard MD;  Location: [facility];  Service: Gastroenterology   • TX FINGER TENDON TRANSFER,2-5 FINGRS Right 6/23/2022    Procedure: Right ring finger flexor digitorum superficialis to flexor pollicis longus tendon transfer;  Surgeon: Anish Mejia MD;  Location: [facility];  Service: Orthopedics   • TX LAP, SURG PROCTOPEXY N/A 7/13/2018    Procedure: ROBOTIC SIGMOID RESECTION / RECTOPEXY;  Surgeon: Rea Miner MD;  Location: [facility];  Service: Colorectal   • TX OPEN TREATMENT RADIAL SHAFT FRACTURE Right 10/11/2021    Procedure: OPEN REDUCTION W/ INTERNAL FIXATION (ORIF) RADIUS (WRIST), RIGHT DISTAL;  Surgeon: Mami Uribe MD;  Location: [facility];  Service: Orthopedics   • TX REMOVAL DEEP IMPLANT Right 6/23/2022    Procedure: Removal of hardware volar aspect right distal radius (distal radial plate and screws);   Surgeon: Anish Mejia MD;  [facility]: [facility];  Service: Orthopedics   • TX SIGMOIDOSCOPY FLX DX W/COLLJ SPEC BR/WA IF PFRMD N/A 7/13/2018 Procedure: SIGMOIDOSCOPY FLEXIBLE;  Surgeon: Julieta Perez MD;  [facility];  Service: Colorectal   • TUBAL LIGATION Bilateral 1997   • Donnie [address] THYROID BIOPSY  7/30/2019       Family History   Problem Relation Age of Onset   • Bipolar disorder Mother    • Mental illness Mother         depression   • Stroke Mother    • Dementia Mother    • Colon polyps Mother    • Heart disease Father    • Hypertension Father    • Diabetes Father    • Other Family         Back disorder   • Diabetes Family    • Heart disease Family    • Hypertension Family    • Stroke Family    • Thyroid disease Family    • Breast cancer Paternal Grandmother         age unknown   • Breast cancer Paternal Aunt         age unknown   • Breast cancer Maternal Aunt         age unknown   • Mental illness Sister    • Colon polyps Sister    • Mental illness Sister    • Heart disease Sister    • No Known Problems Sister    • Breast cancer Sister 76   • Other Son         pituitary tumor   • Hypertension Son    • Obesity Son    • No Known Problems Son    • No Known Problems Maternal Grandmother    • No Known Problems Maternal Grandfather    • No Known Problems Paternal Grandfather    • Breast cancer Paternal Aunt         age unknown   • Substance Abuse Neg Hx         neg fam hx   • Colon cancer Neg Hx      Social History     Socioeconomic History   • Marital status:      Spouse name: None   • Number of children: None   • Years of education: None   • Highest education level: None   Occupational History   • 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   Tobacco Use   • Smoking status: Never   • Smokeless tobacco: Never   Vaping Use   • Vaping Use: Never used   Substance and Sexual Activity   • Alcohol use: Never   • Drug use: Not Currently   • Sexual activity: Not Currently   Other Topics Concern   • None   Social History Narrative    Daily caffeine consumption 2-3 servings a day    Lives with family  No living will  Has dentures---no dental care  Primary language--English  Feels safe at home  Social Determinants of Health     Financial Resource Strain: Medium Risk   • Difficulty of Paying Living Expenses: Somewhat hard   Food Insecurity: Not on file   Transportation Needs: No Transportation Needs   • Lack of Transportation (Medical): No   • Lack of Transportation (Non-Medical):  No   Physical Activity: Not on file   Stress: Not on file   Social Connections: Not on file   Intimate Partner Violence: Not on file   Housing Stability: Not on file         Living donors:  has not discussed with family    Current Outpatient Medications   Medication Sig Dispense Refill   • acetaminophen (TYLENOL) 650 mg CR tablet Take 1 tablet (650 mg total) by mouth every 8 (eight) hours as needed for mild pain 30 tablet 0   • albuterol (Ventolin HFA) 90 mcg/act inhaler Inhale 2 puffs every 6 (six) hours as needed for wheezing or shortness of breath 8 5 g 3   • AMILoride 5 mg tablet Take 1 tablet (5 mg total) by mouth 2 (two) times a day 180 tablet 3   • amLODIPine (NORVASC) 5 mg tablet Take 1 tablet (5 mg total) by mouth daily 30 tablet 3   • aspirin 81 mg chewable tablet Chew 1 tablet (81 mg total) daily     • carvedilol (COREG) 25 mg tablet Take 1 tablet (25 mg total) by mouth 2 (two) times a day with meals 180 tablet 3   • cholecalciferol (VITAMIN D3) 1,000 units tablet Take 5 tablets (5,000 Units total) by mouth daily 90 tablet 5   • clonazePAM (KlonoPIN) 0 5 mg tablet Take 1 tablet (0 5 mg total) by mouth 3 (three) times a day 270 tablet 0   • fluvoxaMINE (LUVOX) 100 mg tablet Take 1 tab in the morning, 2 tab at night 90 tablet 0   • lamoTRIgine (LaMICtal) 200 MG tablet Lamotrigine 200m tablet po in the morning + 1 tablet po at night 180 tablet 0   • omeprazole (PriLOSEC) 40 MG capsule Take 1 capsule (40 mg total) by mouth daily before breakfast 90 capsule 3   • ondansetron (ZOFRAN) 4 mg tablet Take 1 tablet (4 mg total) by mouth every 8 (eight) hours as needed for nausea or vomiting 60 tablet 1   • QUEtiapine (SEROquel) 100 mg tablet Quetiapine 100mg : 1 tablet + 400mg po at night 90 tablet 0   • QUEtiapine (SEROquel) 300 mg tablet Quetiapine 300m tablet po  in morning 90 tablet 0   • QUEtiapine (SEROquel) 400 MG tablet Quetiapine 400m tablet + 100mg tablet po at night 90 tablet 0   • rosuvastatin (CRESTOR) 20 MG tablet Take 1 tablet (20 mg total) by mouth every evening 90 tablet 3   • traMADol (Ultram) 50 mg tablet Take 1 tablet (50 mg total) by mouth every 8 (eight) hours as needed for severe pain 60 tablet 0   • fluvoxaMINE (LUVOX) 100 mg tablet Fluvoxamine 100m tablet in the morning ; 2 tablets at night (Patient not taking: Reported on 2022) 270 tablet 0   • linaCLOtide (Linzess) 290 MCG CAPS Take 1 capsule by mouth daily (Patient not taking: Reported on 2022) 90 capsule 0     No current facility-administered medications for this visit       Pollen extract    Physical Exam:    Ht 5' 7" (1 702 m)   Wt 102 kg (224 lb)   LMP  (LMP Unknown)   BMI 35 08 kg/m²     General : NAD    HEENT: anicteric  Cardiac:  RRR, S1, S2 normal,  soft murmur    Lung:  CTAB   Abdomen:  soft, nontender, no ascites   no edema   Neuro:no focal neuro deficits   Skin: tattoo no  Carotid bruit: no

## 2022-12-16 ENCOUNTER — APPOINTMENT (OUTPATIENT)
Dept: PHYSICAL THERAPY | Facility: CLINIC | Age: 62
End: 2022-12-16

## 2022-12-21 ENCOUNTER — OFFICE VISIT (OUTPATIENT)
Dept: CARDIOLOGY CLINIC | Facility: CLINIC | Age: 62
End: 2022-12-21

## 2022-12-21 VITALS
WEIGHT: 227 LBS | DIASTOLIC BLOOD PRESSURE: 88 MMHG | BODY MASS INDEX: 35.63 KG/M2 | SYSTOLIC BLOOD PRESSURE: 150 MMHG | HEIGHT: 67 IN | HEART RATE: 84 BPM

## 2022-12-21 DIAGNOSIS — R06.00 DYSPNEA, UNSPECIFIED TYPE: ICD-10-CM

## 2022-12-21 DIAGNOSIS — R06.02 SHORTNESS OF BREATH: ICD-10-CM

## 2022-12-21 DIAGNOSIS — I10 ESSENTIAL HYPERTENSION: Primary | ICD-10-CM

## 2022-12-21 NOTE — PROGRESS NOTES
Cardiology Follow Up    Altagracia Mayer  1960  [id number]  [address] [facility]  [address] [facility] [id number] [phone]    1  Essential hypertension  NM myocardial perfusion spect (rx stress and/or rest)      2  Dyspnea, unspecified type  NM myocardial perfusion spect (rx stress and/or rest)      3  Shortness of breath  NM myocardial perfusion spect (rx stress and/or rest)          Interval History: Followup HTN    She has dyspnea with moderate exertion  No angina  BP is still running on the higher side  She is being evaluated for kidney transplant  Medical Problems     Problem List     Hypercholesteremia (Chronic)    Overview Signed 3/20/2018  9:51 AM by Nguyễn Mcfarland DO     Dx age 50         Spondylolisthesis at L5-S1 level    Renal cyst    Vitamin D deficiency    Secondary renal hyperparathyroidism ([address] )    Herniated nucleus pulposus, L5-S1    Idiopathic chronic pancreatitis ([address] )    Primary osteoarthritis of right knee    Overview Signed 3/20/2018  9:45 AM by Nguyễn Mcfarland DO     Work injury 2013 approx- seen at [place]         Age related osteoporosis (Chronic)    Overview Addendum 1/2/2019 10:40 AM by Nguyễn Mcfarland DO     Had left ankle fracture age 50   clavicle fracture as child- skateboard   had dexascan dec 2017         Cardiac murmur    Overview Signed 1/2/2019 10:39 AM by Nguyễn Mcfarland DO     Echo done 6/2018- mild pulmonary valve regurg and normal lv ej fraction- grade 1 diastolic dysfunction noted         Rectal prolapse    Overview Signed 7/30/2018 10:31 AM by Nguyễn Mcfarland DO     Surgery done July 2018- Dr May Isidro         Elevated LFTs    Primary hypertension    Hyperprolactinemia [facility])    Overview Signed 1/2/2019 10:36 AM by Nguyễn Mcfarland DO     Was dx in approx in her 31's with amenorrhea- had pitiutary ?  Cyst- last mri over 10 years ago in [facility] 1 disorder, depressed, severe ([address] ) Overview Signed 1/23/2019  9:13 AM by Tc Melgar DO     Follows with [facility]         Obsessive compulsive disorder    Overview Signed 1/23/2019  9:13 AM by Tc Melgar DO     From [facility] notes 11/2018         Panic disorder with agoraphobia    Overview Signed 1/23/2019  9:14 AM by Tc Melgar DO     From [facility] notes11/2018         Eating disorder    Overview Signed 1/23/2019  9:14 AM by Tc Melgar DO     From [facility] notes 11/2018         Hyperparathyroidism ([facility] )    Benign neoplasm of colon    Drug-induced constipation    Infarction of left basal ganglia (HCC)    Overview Signed 5/1/2019  9:54 AM by Tc Melgar DO     found on  Mri 4/2019         Abnormal chest CT    Hyperphosphatemia    Abnormal thyroid exam    Thyroid nodule    Moderate persistent asthma without complication    Primary osteoarthritis of left knee    Steal syndrome dialysis vascular access [facility])    AVF (arteriovenous fistula) (HCC)    Right patellofemoral syndrome    CKD (chronic kidney disease) stage 4, GFR 15-29 ml/min ([facility])    Lab Results   Component Value Date    EGFR 18 12/01/2022    EGFR 19 10/05/2022    EGFR 18 08/31/2022    CREATININE 2 65 (H) 12/01/2022    CREATININE 2 61 (H) 10/05/2022    CREATININE 2 74 (H) 08/31/2022         Bilateral sacroiliitis ([facility])    Hallux valgus, right    Acquired hallux interphalangeus of right foot    Effusion of right knee    Left knee tendonitis    Family history of cardiac disorder in father    Anxiety    Claw toe, acquired, right    Injury of plantar plate, right, initial encounter    Acquired hallux interphalangeus, right    Closed Colles' fracture of right radius    Aftercare following surgery of the musculoskeletal system    Acute shoulder pain    Accelerated hypertension    Nausea    GERD (gastroesophageal reflux disease)    End stage renal disease ([facility] )    Lab Results   Component Value Date    EGFR 18 12/01/2022    EGFR 19 10/05/2022    EGFR 18 08/31/2022    CREATININE 2 65 (H) 12/01/2022    CREATININE 2 61 (H) 10/05/2022    CREATININE 2 74 (H) 08/31/2022         Injury of right thumb    Pain of right upper extremity    Continuous opioid dependence (HCC)    Severe depressed bipolar I disorder without psychotic features ([facility] )    Mixed obsessional thoughts and acts    Eating disorder, unspecified    Chronic back pain (Chronic)    Obesity, morbid ([facility] )        Past Medical History:   Diagnosis Date   • Anxiety    • Anxiety disorder    • Arthritis    • At risk for falls    • Bipolar 2 disorder (HCC)    • Chronic back pain    • Chronic kidney disease    • Closed fracture of distal end of right fibula with routine healing 11/04/2020   • COVID-19     in Jan 2021   • CVA (cerebral vascular accident) [facility])     noted on MRI in the past   • Depression    • GERD (gastroesophageal reflux disease)    • Hypercholesteremia    • Hypernatremia    • Hypertension    • Hypokalemia    • Idiopathic chronic pancreatitis ([facility] ) 03/20/2018   • Intervertebral disc disorder with radiculopathy of lumbosacral region     resolved: 12/28/2015   • Kidney disease    • Limb alert care status     LUE-fistula   • Panic attacks    • Pericardial effusion    • PONV (postoperative nausea and vomiting)    • Psychiatric problem    • Radiculitis     resolved: 12/28/2015   • Secondary renal hyperparathyroidism ([facility] )    • Stroke ([facility] )    • Vitamin D deficiency      Social History     Socioeconomic History   • Marital status:      Spouse name: Not on file   • Number of children: Not on file   • Years of education: Not on file   • Highest education level: Not on file   Occupational History   • 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   Tobacco Use   • Smoking status: Never   • Smokeless tobacco: Never   Vaping Use   • Vaping Use: Never used   Substance and Sexual Activity   • Alcohol use: Never   • Drug use: Not Currently   • Sexual activity: Not Currently   Other Topics Concern   • Not on file   Social History Narrative    Daily caffeine consumption 2-3 servings a day    Lives with family  No living will  Has dentures---no dental care  Primary language--English  Feels safe at home  Social Determinants of Health     Financial Resource Strain: Medium Risk   • Difficulty of Paying Living Expenses: Somewhat hard   Food Insecurity: Not on file   Transportation Needs: No Transportation Needs   • Lack of Transportation (Medical): No   • Lack of Transportation (Non-Medical):  No   Physical Activity: Not on file   Stress: Not on file   Social Connections: Not on file   Intimate Partner Violence: Not on file   Housing Stability: Not on file      Family History   Problem Relation Age of Onset   • Bipolar disorder Mother    • Mental illness Mother         depression   • Stroke Mother    • Dementia Mother    • Colon polyps Mother    • Heart disease Father    • Hypertension Father    • Diabetes Father    • Other Family         Back disorder   • Diabetes Family    • Heart disease Family    • Hypertension Family    • Stroke Family    • Thyroid disease Family    • Breast cancer Paternal Grandmother         age unknown   • Breast cancer Paternal Aunt         age unknown   • Breast cancer Maternal Aunt         age unknown   • Mental illness Sister    • Colon polyps Sister    • Mental illness Sister    • Heart disease Sister    • No Known Problems Sister    • Breast cancer Sister 76   • Other Son         pituitary tumor   • Hypertension Son    • Obesity Son    • No Known Problems Son    • No Known Problems Maternal Grandmother    • No Known Problems Maternal Grandfather    • No Known Problems Paternal Grandfather    • Breast cancer Paternal Aunt         age unknown   • Substance Abuse Neg Hx         neg fam hx   • Colon cancer Neg Hx      Past Surgical History:   Procedure Laterality Date   • BUNIONECTOMY      Left foot    • CAST APPLICATION Right 9/95/9335    Procedure: Application short-arm thumb spica splint;  Surgeon: Claudia Bruno MD; Location: [facility];  Service: Orthopedics   • COLON SURGERY     • COLONOSCOPY  2021   • DILATION AND CURETTAGE OF UTERUS     • INDUCED       surgically induced   • NH ANASTOMOSIS,AV,ANY SITE Left 2019    Procedure: CREATION FISTULA ARTERIOVENOUS (AV) left wrists possible left upper;  Surgeon: Baron Court MD;  Location: [facility];  Service: Vascular   • NH CORRJ [address] W/SESMDC W/DIST Pricila Aviles Left 2019    Procedure: [place];  Surgeon: Emily Hayward DPM;  Location: [facility];  Service: Podiatry   • [address]  W/SESMDC W/DIST Pricila Aviles Right 8/3/2020    Procedure: Thereasa Colebrook;  Surgeon: Emily Hayward DPM;  Location: [facility];  Service: Podiatry   • NH CORRJ HALLUX VALGUS W/SESMDC W/PROX PHLNX OSTEOT Right 2021    Procedure: Doneen Favor, right tameka osteotomy and 2nd claw toe correction;  Surgeon: March Severs, MD;  Location: [facility];  Service: Orthopedics   • NH ERCP DX COLLECTION SPECIMEN BRUSHING/WASHING N/A 2018    Procedure: ENDOSCOPIC RETROGRADE CHOLANGIOPANCREATOGRAPHY (ERCP);   Surgeon: Solange Wilkins MD;  Location: [facility];  Service: Gastroenterology   • NH FINGER TENDON TRANSFER,2-5 FINGRS Right 2022    Procedure: Right ring finger flexor digitorum superficialis to flexor pollicis longus tendon transfer;  Surgeon: Cheryle Good MD;  Location: [facility];  Service: Orthopedics   • NH LAP, SURG PROCTOPEXY N/A 2018    Procedure: ROBOTIC SIGMOID RESECTION / RECTOPEXY;  Surgeon: Fabian Salinas MD;  Location: [facility];  Service: Colorectal   • NH OPEN TREATMENT RADIAL SHAFT FRACTURE Right 10/11/2021    Procedure: OPEN REDUCTION W/ INTERNAL FIXATION (ORIF) RADIUS (WRIST), RIGHT DISTAL;  Surgeon: Keith Willard MD;  Location: [facility];  Service: Orthopedics   • NH REMOVAL DEEP IMPLANT Right 2022    Procedure: Removal of hardware volar aspect right distal radius (distal radial plate and screws);   Surgeon: Lizzette Casanova MD;  Location:  MAIN OR;  Service: Orthopedics   • PA SIGMOIDOSCOPY FLX DX W/COLLJ SPEC BR/WA IF PFRMD N/A 2018    Procedure: SIGMOIDOSCOPY FLEXIBLE;  Surgeon: Tammy Benjamin MD;  Location[facility];  Service: Colorectal   • TUBAL LIGATION Bilateral    • US GUIDED THYROID BIOPSY  2019       Current Outpatient Medications:   •  acetaminophen (TYLENOL) 650 mg CR tablet, Take 1 tablet (650 mg total) by mouth every 8 (eight) hours as needed for mild pain, Disp: 30 tablet, Rfl: 0  •  albuterol (Ventolin HFA) 90 mcg/act inhaler, Inhale 2 puffs every 6 (six) hours as needed for wheezing or shortness of breath, Disp: 8 5 g, Rfl: 3  •  AMILoride 5 mg tablet, Take 1 tablet (5 mg total) by mouth 2 (two) times a day, Disp: 180 tablet, Rfl: 3  •  amLODIPine (NORVASC) 5 mg tablet, Take 1 tablet (5 mg total) by mouth daily, Disp: 30 tablet, Rfl: 3  •  aspirin 81 mg chewable tablet, Chew 1 tablet (81 mg total) daily, Disp: , Rfl:   •  carvedilol (COREG) 25 mg tablet, Take 1 tablet (25 mg total) by mouth 2 (two) times a day with meals, Disp: 180 tablet, Rfl: 3  •  cholecalciferol (VITAMIN D3) 1,000 units tablet, Take 5 tablets (5,000 Units total) by mouth daily, Disp: 90 tablet, Rfl: 5  •  [START ON 2022] clonazePAM (KlonoPIN) 0 5 mg tablet, Take 1 tablet (0 5 mg total) by mouth 3 (three) times a day Do not start before 2022 , Disp: 270 tablet, Rfl: 0  •  fluvoxaMINE (LUVOX) 100 mg tablet, Fluvoxamine 100m tablet in the morning ; 2 tablets at night, Disp: 270 tablet, Rfl: 0  •  lamoTRIgine (LaMICtal) 200 MG tablet, Lamotrigine 200m tablet po in the morning + 1 tablet po at night, Disp: 180 tablet, Rfl: 0  •  linaCLOtide (Linzess) 290 MCG CAPS, Take 1 capsule by mouth daily, Disp: 90 capsule, Rfl: 0  •  omeprazole (PriLOSEC) 40 MG capsule, Take 1 capsule (40 mg total) by mouth daily before breakfast, Disp: 90 capsule, Rfl: 3  •  ondansetron (ZOFRAN) 4 mg tablet, Take 1 tablet (4 mg total) by mouth every 8 (eight) hours as needed for nausea or vomiting, Disp: 60 tablet, Rfl: 1  •  QUEtiapine (SEROquel) 100 mg tablet, Quetiapine 100mg : 1 tablet + 400mg po at night, Disp: 90 tablet, Rfl: 0  •  QUEtiapine (SEROquel) 300 mg tablet, Quetiapine 300m tablet po  in morning, Disp: 90 tablet, Rfl: 0  •  QUEtiapine (SEROquel) 400 MG tablet, Quetiapine 400m tablet + 100mg tablet po at night, Disp: 90 tablet, Rfl: 0  •  rosuvastatin (CRESTOR) 20 MG tablet, Take 1 tablet (20 mg total) by mouth every evening, Disp: 90 tablet, Rfl: 3  •  traMADol (Ultram) 50 mg tablet, Take 1 tablet (50 mg total) by mouth every 8 (eight) hours as needed for severe pain, Disp: 60 tablet, Rfl: 0  Allergies   Allergen Reactions   • Pollen Extract Nasal Congestion       Labs:     Chemistry        Component Value Date/Time     2017 0758    K 4 7 2022 0707    K 4 7 2017 0758     (H) 2022 0707     2017 0758    CO2 24 2022 0707    CO2 28 2017 0758    BUN 35 (H) 2022 0707    BUN 36 (H) 2017 0758    CREATININE 2 65 (H) 2022 0707    CREATININE 1 86 (H) 2017 0758        Component Value Date/Time    CALCIUM 10 2 (H) 2022 0707    CALCIUM 9 6 2017 0758    CALCIUM 9 6 2017 0758    ALKPHOS 179 (H) 2022 0707    AST 28 2022 0707    ALT 39 2022 0707            No results found for: CHOL  Lab Results   Component Value Date    HDL 48 (L) 2022    HDL 66 2022    HDL 66 2021     Lab Results   Component Value Date    LDLCALC 143 (H) 2022    LDLCALC 145 (H) 2022    LDLCALC 132 (H) 2021     Lab Results   Component Value Date    TRIG 358 (H) 2022    TRIG 149 2022    TRIG 143 2021     No results found for: CHOLHDL    Imaging: No results found  Review of Systems   Constitutional: Positive for malaise/fatigue  HENT: Negative      Eyes: Negative  Cardiovascular: Negative  Respiratory: Positive for shortness of breath  Hematologic/Lymphatic: Negative  Skin: Negative  Musculoskeletal: Negative  Gastrointestinal: Negative  Genitourinary: Negative  Neurological: Negative  Psychiatric/Behavioral: Negative  Allergic/Immunologic: Negative  Vitals:    12/21/22 0832   BP: 150/88   Pulse: 84           Physical Exam  Vitals and nursing note reviewed  Constitutional:       Appearance: Normal appearance  HENT:      Head: Normocephalic  Nose: Nose normal       Mouth/Throat:      Mouth: Mucous membranes are moist    Eyes:      General: No scleral icterus  Conjunctiva/sclera: Conjunctivae normal    Cardiovascular:      Rate and Rhythm: Normal rate and regular rhythm  Heart sounds: Murmur heard  No gallop  Pulmonary:      Effort: Pulmonary effort is normal  No respiratory distress  Breath sounds: Normal breath sounds  No wheezing or rales  Abdominal:      General: Abdomen is flat  Bowel sounds are normal  There is no distension  Palpations: Abdomen is soft  Tenderness: There is no abdominal tenderness  There is no guarding  Musculoskeletal:      Cervical back: Normal range of motion and neck supple  Right lower leg: No edema  Left lower leg: No edema  Skin:     General: Skin is warm and dry  Neurological:      General: No focal deficit present  Mental Status: She is alert and oriented to person, place, and time  Psychiatric:         Mood and Affect: Mood normal          Behavior: Behavior normal          Discussion/Summary:    Hypertension: Continue with carvedilol  Amlodipine just started  She is being evaluated for kidney transplant  She has exertional dyspnea so will check lexiscan MPI  Mild AS: Continue to monitor  The patient was counseled regarding diagnostic results, instructions for management, risk factor reductions, impressions   total time of encounter was 25 minutes and 15 minutes was spent counseling

## 2022-12-22 ENCOUNTER — HOSPITAL ENCOUNTER (OUTPATIENT)
Dept: MRI IMAGING | Facility: HOSPITAL | Age: 62
End: 2022-12-22
Attending: ANESTHESIOLOGY

## 2022-12-22 ENCOUNTER — TELEPHONE (OUTPATIENT)
Dept: FAMILY MEDICINE CLINIC | Facility: HOSPITAL | Age: 62
End: 2022-12-22

## 2022-12-22 DIAGNOSIS — M54.16 LUMBAR RADICULOPATHY: ICD-10-CM

## 2022-12-23 ENCOUNTER — APPOINTMENT (OUTPATIENT)
Dept: PHYSICAL THERAPY | Facility: CLINIC | Age: 62
End: 2022-12-23

## 2022-12-23 DIAGNOSIS — M20.11 ACQUIRED HALLUX INTERPHALANGEUS, RIGHT: ICD-10-CM

## 2022-12-23 DIAGNOSIS — M20.5X1 CLAW TOE, ACQUIRED, RIGHT: ICD-10-CM

## 2022-12-23 DIAGNOSIS — S99.921A INJURY OF PLANTAR PLATE, RIGHT, INITIAL ENCOUNTER: ICD-10-CM

## 2022-12-23 RX ORDER — ONDANSETRON 4 MG/1
4 TABLET, FILM COATED ORAL EVERY 8 HOURS PRN
Qty: 60 TABLET | Refills: 1 | Status: SHIPPED | OUTPATIENT
Start: 2022-12-23

## 2022-12-27 NOTE — TELEPHONE ENCOUNTER
Left message on -radiology's am to please read PT's MRI ordered by Dr Bjorn Shanks - also left message for PT to call [phone] [address] office for  results

## 2022-12-28 ENCOUNTER — TELEPHONE (OUTPATIENT)
Dept: FAMILY MEDICINE CLINIC | Facility: HOSPITAL | Age: 62
End: 2022-12-28

## 2022-12-29 ENCOUNTER — HOSPITAL ENCOUNTER (OUTPATIENT)
Dept: NUCLEAR MEDICINE | Facility: HOSPITAL | Age: 62
End: 2022-12-29
Attending: INTERNAL MEDICINE

## 2022-12-29 ENCOUNTER — OFFICE VISIT (OUTPATIENT)
Dept: PAIN MEDICINE | Facility: CLINIC | Age: 62
End: 2022-12-29

## 2022-12-29 ENCOUNTER — HOSPITAL ENCOUNTER (OUTPATIENT)
Dept: NUCLEAR MEDICINE | Facility: HOSPITAL | Age: 62
Discharge: HOME | End: 2022-12-29
Attending: INTERNAL MEDICINE

## 2022-12-29 ENCOUNTER — HOSPITAL ENCOUNTER (OUTPATIENT)
Dept: NON INVASIVE DIAGNOSTICS | Facility: HOSPITAL | Age: 62
Discharge: HOME | End: 2022-12-29
Attending: INTERNAL MEDICINE

## 2022-12-29 VITALS
DIASTOLIC BLOOD PRESSURE: 90 MMHG | HEIGHT: 67 IN | TEMPERATURE: 98 F | BODY MASS INDEX: 35.63 KG/M2 | WEIGHT: 227 LBS | SYSTOLIC BLOOD PRESSURE: 140 MMHG

## 2022-12-29 DIAGNOSIS — G89.4 CHRONIC PAIN SYNDROME: ICD-10-CM

## 2022-12-29 DIAGNOSIS — I10 ESSENTIAL HYPERTENSION: ICD-10-CM

## 2022-12-29 DIAGNOSIS — R06.02 SHORTNESS OF BREATH: ICD-10-CM

## 2022-12-29 DIAGNOSIS — M79.18 MYOFASCIAL PAIN SYNDROME: Primary | ICD-10-CM

## 2022-12-29 DIAGNOSIS — R06.00 DYSPNEA, UNSPECIFIED TYPE: ICD-10-CM

## 2022-12-29 DIAGNOSIS — M51.36 DISC DEGENERATION, LUMBAR: ICD-10-CM

## 2022-12-29 DIAGNOSIS — M48.061 FORAMINAL STENOSIS OF LUMBAR REGION: ICD-10-CM

## 2022-12-29 DIAGNOSIS — G89.29 CHRONIC BILATERAL LOW BACK PAIN, UNSPECIFIED WHETHER SCIATICA PRESENT: ICD-10-CM

## 2022-12-29 DIAGNOSIS — M54.50 CHRONIC BILATERAL LOW BACK PAIN, UNSPECIFIED WHETHER SCIATICA PRESENT: ICD-10-CM

## 2022-12-29 LAB
BASELINE ST DEPRESSION: 0 MM
NUC STRESS EJECTION FRACTION: 70 %
SL CV REST NUCLEAR ISOTOPE DOSE: 10.8 MCI
SL CV STRESS NUCLEAR ISOTOPE DOSE: 32 MCI
STRESS ANGINA INDEX: 0
STRESS BASELINE HR: 105 BPM
STRESS POST PEAK BP: 188 MMHG
STRESS ST DEPRESSION: 0 MM
STRESS/REST PERFUSION RATIO: 0.87

## 2022-12-29 RX ADMIN — REGADENOSON 0.4 MG: 0.08 INJECTION, SOLUTION INTRAVENOUS at 13:33

## 2022-12-29 NOTE — PROGRESS NOTES
Assessment:  1  Chronic pain syndrome    2  Chronic bilateral low back pain, unspecified whether sciatica present    3  Disc degeneration, lumbar    4  Foraminal stenosis of lumbar region        Plan:  The patient is a 63 y o  female last seen on 11/28/2022 who presents for a follow up office visit in regards to chronic pain secondary to low back pain, lumbar spondylosis, spondylolisthesis, lumbar disc disease, and lumbar foraminal stenosis  Patient presents today with ongoing low back pain  She had underwent an MRI of the lumbar spine since the last office visit  This was reviewed in depth with the patient today  It showed unchanged chronic L5 pars defect with anterior listhesis L5-S1 with mild to moderate degenerative disc disease and mild bilateral foraminal narrowing at L5-S1  Based on her symptoms, this does not correlate with the MRI findings  Upon examination her pain appears to be consistent with myofascial pain  The patient has a stooped forward posture and also tilts to the left when walking  I asked that she work on postural correction with physical therapy  I will also schedule her for trigger point injections into the right lumbar paraspinal musculature, up with myofascial release    The patient was advised to contact the office should their symptoms worsen in the interim  The patient was agreeable and verbalized an understanding  History of Present Illness: The patient is a 63 y o  female last seen on 11/28/2022 who presents for a follow up office visit in regards to chronic pain secondary to low back pain, lumbar spondylosis, spondylolisthesis, lumbar disc disease, and lumbar foraminal stenosis  He also has a history of kidney disease and is being evaluated for renal transplant  Her last office visit was November 20, 2022 which was her initial consultation  She was ordered an MRI of the lumbar spine  Patient presents today with ongoing low back pain   The pain is located mainly on the right side  The pain radiates into the right hip and groin and abdomen  She feels the pain is worse since the last office visit  It is constant but worse in the morning and evening  She feels pain when taking a deep breath  She describes it as dull aching, sharp, and cramping  Is currently rating her pain a 7/10 on the numeric rating scale  She is currently taking tramadol 50 mg 1 tablet every 8 hours which is prescribed by her primary care physician  She is currently in PT      I have personally reviewed and/or updated the patient's past medical history, past surgical history, family history, social history, current medications, allergies, and vital signs today  Review of Systems:    Review of Systems   Musculoskeletal: Positive for gait problem and joint swelling (joint stiffness)  Decreased ROM     Neurological: Positive for dizziness           Past Medical History:   Diagnosis Date   • Anxiety    • Anxiety disorder    • Arthritis    • At risk for falls    • Bipolar 2 disorder (HCC)    • Chronic back pain    • Chronic kidney disease    • Closed fracture of distal end of right fibula with routine healing 11/04/2020   • COVID-19     in Jan 2021   • CVA (cerebral vascular accident) [facility])     noted on MRI in the past   • Depression    • GERD (gastroesophageal reflux disease)    • Hypercholesteremia    • Hypernatremia    • Hypertension    • Hypokalemia    • Idiopathic chronic pancreatitis ([facility] [address] ) 03/20/2018   • Intervertebral disc disorder with radiculopathy of lumbosacral region     resolved: 12/28/2015   • Kidney disease    • Limb alert care status     LUE-fistula   • Panic attacks    • Pericardial effusion    • PONV (postoperative nausea and vomiting)    • Psychiatric problem    • Radiculitis     resolved: 12/28/2015   • [facility]    • Stroke [facility])    • Vitamin D deficiency        Past Surgical History:   Procedure Laterality Date   • BUNIONECTOMY      Left foot    • CAST APPLICATION Right 7631    Procedure: Application short-arm thumb spica splint;  Surgeon: Socorro Marquis MD;  Location: [facility];  Service: Orthopedics   • COLON SURGERY     • COLONOSCOPY  2021   • DILATION AND CURETTAGE OF UTERUS     • INDUCED       surgically induced   • GA ARTERIOVENOUS ANASTOMOSIS OPEN DIRECT Left 2019    Procedure: CREATION FISTULA ARTERIOVENOUS (AV) left wrists possible left upper;  Surgeon: Jackeline Flores MD;  Location: [facility];  Service: Vascular   • GA CORRJ [address] W/SESMDC W/DIST Phong Raring Left 2019    Procedure: Gilford Shows;  Surgeon: Mars Moreland DPM;  [facility]: [facility];  Service: Podiatry   • [address]  97  W/SESMDC W/DIST Phong Raring Right 8/3/2020    Procedure: Lela Crease;  Surgeon: Mars Moreland DPM;  Location: [facility];  Service: Podiatry   • GA CORRJ HALLUX VALGUS W/SESMDC W/PROX PHLNX OSTEOT Right 2021    Procedure: Kate Escobar, right tameka osteotomy and 2nd claw toe correction;  Surgeon: Leopold Mulligan, MD;  Location: [facility];  Service: Orthopedics   • GA ERCP DX COLLECTION SPECIMEN BRUSHING/WASHING N/A 2018    Procedure: ENDOSCOPIC RETROGRADE CHOLANGIOPANCREATOGRAPHY (ERCP); Surgeon: Terrance Lynch MD;  [facility];  Service: Gastroenterology   • GA LAPAROSCOPY PROCTOPEXY PROLAPSE N/A 2018    Procedure: ROBOTIC SIGMOID RESECTION / RECTOPEXY;  Surgeon: Edith Miranda MD;  [facility];  Service: Colorectal   • GA OPEN TREATMENT RADIAL SHAFT FRACTURE Right 10/11/2021    Procedure: OPEN REDUCTION W/ INTERNAL FIXATION (ORIF) RADIUS (WRIST), RIGHT DISTAL;  Surgeon: Rhina Rivera MD;  Location: [facility];  Service: Orthopedics   • GA REMOVAL IMPLANT DEEP Right 2022    Procedure: Removal of hardware volar aspect right distal radius (distal radial plate and screws);   Surgeon: Socorro Marquis MD;  Location: [facility];  Service: Orthopedics   • CT SIGMOIDOSCOPY FLX DX W/COLLJ SPEC BR/WA IF PFRMD N/A 7/13/2018    Procedure: Fish Mooney;  Surgeon: Jer Zelaya MD;  [facility];  Service: Colorectal   • CT TR TDN RESTORE INTRNSC FUNCJ ALL 4 FNGRS Right 6/23/2022    Procedure: Right ring finger flexor digitorum superficialis to flexor pollicis longus tendon transfer;  Surgeon: Claudia Bruno MD;  Location: [facility];  Service: Orthopedics   • TUBAL LIGATION Bilateral 1997   • Donnie [address] THYROID BIOPSY  7/30/2019       Family History   Problem Relation Age of Onset   • Bipolar disorder Mother    • Mental illness Mother         depression   • Stroke Mother    • Dementia Mother    • Colon polyps Mother    • Heart disease Father    • Hypertension Father    • Diabetes Father    • Other Family         Back disorder   • Diabetes Family    • Heart disease Family    • Hypertension Family    • Stroke Family    • Thyroid disease Family    • Breast cancer Paternal Grandmother         age unknown   • Breast cancer Paternal Aunt         age unknown   • Breast cancer Maternal Aunt         age unknown   • Mental illness Sister    • Colon polyps Sister    • Mental illness Sister    • Heart disease Sister    • No Known Problems Sister    • Breast cancer Sister 76   • Other Son         pituitary tumor   • Hypertension Son    • Obesity Son    • No Known Problems Son    • No Known Problems Maternal Grandmother    • No Known Problems Maternal Grandfather    • No Known Problems Paternal Grandfather    • Breast cancer Paternal Aunt         age unknown   • Substance Abuse Neg Hx         neg fam hx   • Colon cancer Neg Hx        Social History     Occupational History   • 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   Tobacco Use   • Smoking status: Never   • Smokeless tobacco: Never   Vaping Use   • Vaping Use: Never used   Substance and Sexual Activity   • Alcohol use: Never   • Drug use: Not Currently   • Sexual activity: Not Currently         Current Outpatient Medications:   •  acetaminophen (TYLENOL) 650 mg CR tablet, Take 1 tablet (650 mg total) by mouth every 8 (eight) hours as needed for mild pain, Disp: 30 tablet, Rfl: 0  •  albuterol (Ventolin HFA) 90 mcg/act inhaler, Inhale 2 puffs every 6 (six) hours as needed for wheezing or shortness of breath, Disp: 8 5 g, Rfl: 3  •  AMILoride 5 mg tablet, Take 1 tablet (5 mg total) by mouth 2 (two) times a day, Disp: 180 tablet, Rfl: 3  •  amLODIPine (NORVASC) 5 mg tablet, Take 1 tablet (5 mg total) by mouth daily, Disp: 30 tablet, Rfl: 3  •  aspirin 81 mg chewable tablet, Chew 1 tablet (81 mg total) daily, Disp: , Rfl:   •  carvedilol (COREG) 25 mg tablet, Take 1 tablet (25 mg total) by mouth 2 (two) times a day with meals, Disp: 180 tablet, Rfl: 3  •  cholecalciferol (VITAMIN D3) 1,000 units tablet, Take 5 tablets (5,000 Units total) by mouth daily, Disp: 90 tablet, Rfl: 5  •  clonazePAM (KlonoPIN) 0 5 mg tablet, Take 1 tablet (0 5 mg total) by mouth 3 (three) times a day Do not start before 2022 , Disp: 270 tablet, Rfl: 0  •  fluvoxaMINE (LUVOX) 100 mg tablet, Fluvoxamine 100m tablet in the morning ; 2 tablets at night, Disp: 270 tablet, Rfl: 0  •  lamoTRIgine (LaMICtal) 200 MG tablet, Lamotrigine 200m tablet po in the morning + 1 tablet po at night, Disp: 180 tablet, Rfl: 0  •  linaCLOtide (Linzess) 290 MCG CAPS, Take 1 capsule by mouth daily, Disp: 90 capsule, Rfl: 0  •  omeprazole (PriLOSEC) 40 MG capsule, Take 1 capsule (40 mg total) by mouth daily before breakfast, Disp: 90 capsule, Rfl: 3  •  ondansetron (ZOFRAN) 4 mg tablet, Take 1 tablet (4 mg total) by mouth every 8 (eight) hours as needed for nausea or vomiting, Disp: 60 tablet, Rfl: 1  •  QUEtiapine (SEROquel) 100 mg tablet, Quetiapine 100mg : 1 tablet + 400mg po at night, Disp: 90 tablet, Rfl: 0  •  QUEtiapine (SEROquel) 300 mg tablet, Quetiapine 300m tablet po  in morning, Disp: 90 tablet, Rfl: 0  •  QUEtiapine (SEROquel) 400 MG tablet, Quetiapine 400m tablet + 100mg tablet po at night, Disp: 90 tablet, Rfl: 0  •  rosuvastatin (CRESTOR) 20 MG tablet, Take 1 tablet (20 mg total) by mouth every evening, Disp: 90 tablet, Rfl: 3  •  traMADol (Ultram) 50 mg tablet, Take 1 tablet (50 mg total) by mouth every 8 (eight) hours as needed for severe pain, Disp: 60 tablet, Rfl: 0    Allergies   Allergen Reactions   • Pollen Extract Nasal Congestion       Physical Exam:    LMP  (LMP Unknown)     Constitutional:normal, well developed, well nourished, alert, in no distress and non-toxic and no overt pain behavior    Eyes:anicteric  HEENT:grossly intact  Neck:supple, symmetric, trachea midline and no masses   Pulmonary:even and unlabored  Cardiovascular:No edema or pitting edema present  Skin:Normal without rashes or lesions and well hydrated  Psychiatric:Mood and affect appropriate  Neurologic:Cranial Nerves II-XII grossly intact  Musculoskeletal:normal    Lumbar Spine Exam    Appearance:  Normal lordosis  Palpation/Tenderness:  left lumbar paraspinal tenderness  right lumbar paraspinal tenderness  left sacroiliac joint tenderness  right sacroiliac joint tenderness  Palpable trigger points right paraspinal musculature  Range of Motion:  Flexion:  No limitation  without pain  Extension:  No limitation  without pain  Lateral Flexion - Left:  No limitation  without pain  Lateral Flexion - Right:  No limitation  without pain  Motor Strength:  Left hip flexion:  5/5  Right hip flexion:  5/5  Left knee flexion:  5/5  Left knee extension:  5/5  Right knee flexion:  5/5  Right knee extension:  5/5  Left foot dorsiflexion:  5/5  Left foot plantar flexion:  5/5  Right foot dorsiflexion:  5/5  Right foot plantar flexion:  5/5  Special Tests:  Left Straight Leg Test:  negative  Right Straight Leg Test:  negative  Left Rehan's Maneuver:  negative  Right Rehan's Maneuver:  negative  Left Gaenslen's Test:  negative  Right Gaenslen's Test:  negative Imaging    MRI LUMBAR SPINE WITHOUT CONTRAST     INDICATION: M54 16: Radiculopathy, lumbar region  lumbar radic; Low back pain, symptoms persist with > 6wks conservative treatment     COMPARISON: Lumbar spine radiograph October 12, 2022  CT thoracic lumbar without contrast March 4, 2022  MRI lumbar spine without contrast October 23, 2015      TECHNIQUE:  Multiplanar, multisequence imaging of the lumbar spine was performed             IMAGE QUALITY:  Diagnostic     FINDINGS:     VERTEBRAL BODIES:  There are 5 lumbar type vertebral bodies  Unchanged chronic bilateral L5 pars defect with grade 1 anterolisthesis L5-S1  No scoliosis  No compression fracture        Unchanged small L4 intraosseous vertebral body hemangioma  Otherwise, normal bone marrow signal      SACRUM:  Normal signal within the sacrum  No evidence of insufficiency or stress fracture      DISTAL CORD AND CONUS:  Normal size and signal within the distal cord and conus      PARASPINAL SOFT TISSUES:  Paraspinal soft tissues are unremarkable      LOWER THORACIC DISC SPACES:  Normal disc height and signal   No disc herniation, canal stenosis or foraminal narrowing      LUMBAR DISC SPACES:  Mild-to-moderate disc height loss at L5-S1      L1-L2: Normal      L2-L3: Normal      L3-L4: Normal      L4-L5: Normal      L5-S1: Mild uncoverage of posterior disc  Facet arthropathy  No significant canal stenosis, unchanged  Mild bilateral foraminal narrowing, unchanged      OTHER FINDINGS:  Small bilateral renal cysts      IMPRESSION:     No acute abnormality of lumbar spine      Unchanged chronic bilateral L5 pars defect with grade 1 anterolisthesis L5-S1      Unchanged mild-to-moderate degenerative disc disease with mild bilateral foraminal narrowing at L5-S1, as detailed above      No orders to display         No orders of the defined types were placed in this encounter

## 2022-12-30 ENCOUNTER — OFFICE VISIT (OUTPATIENT)
Dept: FAMILY MEDICINE CLINIC | Facility: HOSPITAL | Age: 62
End: 2022-12-30

## 2022-12-30 ENCOUNTER — HOSPITAL ENCOUNTER (OUTPATIENT)
Dept: CT IMAGING | Facility: HOSPITAL | Age: 62
Discharge: HOME | End: 2022-12-30
Attending: INTERNAL MEDICINE

## 2022-12-30 VITALS
SYSTOLIC BLOOD PRESSURE: 152 MMHG | OXYGEN SATURATION: 98 % | HEIGHT: 67 IN | WEIGHT: 221.6 LBS | HEART RATE: 96 BPM | DIASTOLIC BLOOD PRESSURE: 84 MMHG | BODY MASS INDEX: 34.78 KG/M2

## 2022-12-30 DIAGNOSIS — R35.0 URINARY FREQUENCY: ICD-10-CM

## 2022-12-30 DIAGNOSIS — R10.9 RIGHT FLANK PAIN: ICD-10-CM

## 2022-12-30 DIAGNOSIS — R10.9 RIGHT FLANK PAIN: Primary | ICD-10-CM

## 2022-12-30 LAB
CHEST PAIN STATEMENT: NORMAL
MAX DIASTOLIC BP: 104 MMHG
MAX HEART RATE: 122 BPM
MAX PREDICTED HEART RATE: 160 BPM
MAX. SYSTOLIC BP: 188 MMHG
PROTOCOL NAME: NORMAL
REASON FOR TERMINATION: NORMAL
SL AMB  POCT GLUCOSE, UA: NORMAL
SL AMB LEUKOCYTE ESTERASE,UA: NORMAL
SL AMB POCT BILIRUBIN,UA: NORMAL
SL AMB POCT BLOOD,UA: NORMAL
SL AMB POCT CLARITY,UA: CLEAR
SL AMB POCT COLOR,UA: NORMAL
SL AMB POCT KETONES,UA: NORMAL
SL AMB POCT NITRITE,UA: NORMAL
SL AMB POCT PH,UA: 6.5
SL AMB POCT SPECIFIC GRAVITY,UA: 1.01
SL AMB POCT URINE PROTEIN: NORMAL
SL AMB POCT UROBILINOGEN: NORMAL
TARGET HR FORMULA: NORMAL
TEST INDICATION: NORMAL
TIME IN EXERCISE PHASE: NORMAL

## 2022-12-30 NOTE — PROGRESS NOTES
Assessment/Plan:     Diagnosis ICD-10-CM Associated Orders   1  Right flank pain  R10 9 CT renal stone study abdomen pelvis wo contrast      2  Urinary frequency  R35 0 CT renal stone study abdomen pelvis wo contrast          Problem List Items Addressed This Visit    None  Visit Diagnoses     Right flank pain    -  Primary    Relevant Orders    CT renal stone study abdomen pelvis wo contrast    Urinary frequency        Relevant Orders    CT renal stone study abdomen pelvis wo contrast            No follow-ups on file  Subjective:    Patient ID: Ki Liu is a 63 y o  female    Started about a week ago with worsening right flank pain- reports she had a history of kidney stone years a go and grandfather had renal lithiasis also  no new meds- had some increased frequency of urination in past few days  Increasing pain radiating to anterior right lq- no blood in urine but has had some more concentrated urine at times      The following portions of the patient's history were reviewed and updated as appropriate: allergies, current medications and problem list      Review of Systems   Constitutional: Positive for chills  Negative for fever  Gastrointestinal: Positive for abdominal pain  Negative for nausea, rectal pain and vomiting           Objective:      Current Outpatient Medications:   •  acetaminophen (TYLENOL) 650 mg CR tablet, Take 1 tablet (650 mg total) by mouth every 8 (eight) hours as needed for mild pain, Disp: 30 tablet, Rfl: 0  •  AMILoride 5 mg tablet, Take 1 tablet (5 mg total) by mouth 2 (two) times a day, Disp: 180 tablet, Rfl: 3  •  amLODIPine (NORVASC) 5 mg tablet, Take 1 tablet (5 mg total) by mouth daily, Disp: 30 tablet, Rfl: 3  •  aspirin 81 mg chewable tablet, Chew 1 tablet (81 mg total) daily, Disp: , Rfl:   •  carvedilol (COREG) 25 mg tablet, Take 1 tablet (25 mg total) by mouth 2 (two) times a day with meals, Disp: 180 tablet, Rfl: 3  •  cholecalciferol (VITAMIN D3) 1,000 units tablet, Take 5 tablets (5,000 Units total) by mouth daily, Disp: 90 tablet, Rfl: 5  •  clonazePAM (KlonoPIN) 0 5 mg tablet, Take 1 tablet (0 5 mg total) by mouth 3 (three) times a day Do not start before 2022 , Disp: 270 tablet, Rfl: 0  •  fluvoxaMINE (LUVOX) 100 mg tablet, Fluvoxamine 100m tablet in the morning ; 2 tablets at night, Disp: 270 tablet, Rfl: 0  •  lamoTRIgine (LaMICtal) 200 MG tablet, Lamotrigine 200m tablet po in the morning + 1 tablet po at night, Disp: 180 tablet, Rfl: 0  •  omeprazole (PriLOSEC) 40 MG capsule, Take 1 capsule (40 mg total) by mouth daily before breakfast, Disp: 90 capsule, Rfl: 3  •  ondansetron (ZOFRAN) 4 mg tablet, Take 1 tablet (4 mg total) by mouth every 8 (eight) hours as needed for nausea or vomiting, Disp: 60 tablet, Rfl: 1  •  QUEtiapine (SEROquel) 100 mg tablet, Quetiapine 100mg : 1 tablet + 400mg po at night, Disp: 90 tablet, Rfl: 0  •  QUEtiapine (SEROquel) 300 mg tablet, Quetiapine 300m tablet po  in morning, Disp: 90 tablet, Rfl: 0  •  QUEtiapine (SEROquel) 400 MG tablet, Quetiapine 400m tablet + 100mg tablet po at night, Disp: 90 tablet, Rfl: 0  •  rosuvastatin (CRESTOR) 20 MG tablet, Take 1 tablet (20 mg total) by mouth every evening, Disp: 90 tablet, Rfl: 3  •  traMADol (Ultram) 50 mg tablet, Take 1 tablet (50 mg total) by mouth every 8 (eight) hours as needed for severe pain, Disp: 60 tablet, Rfl: 0  •  albuterol (Ventolin HFA) 90 mcg/act inhaler, Inhale 2 puffs every 6 (six) hours as needed for wheezing or shortness of breath (Patient not taking: Reported on 2022), Disp: 8 5 g, Rfl: 3  •  linaCLOtide (Linzess) 290 MCG CAPS, Take 1 capsule by mouth daily, Disp: 90 capsule, Rfl: 0    Blood pressure 152/84, pulse 96, height 5' 7" (1 702 m), weight 101 kg (221 lb 9 6 oz), SpO2 98 %, not currently breastfeeding  Physical Exam  Vitals and nursing note reviewed  Constitutional:       Appearance: She is not toxic-appearing  Eyes:      General:         Right eye: No discharge  Left eye: No discharge  Cardiovascular:      Rate and Rhythm: Normal rate and regular rhythm  Heart sounds: No murmur heard  Pulmonary:      Breath sounds: No wheezing or rhonchi  Abdominal:      Palpations: Abdomen is soft  Tenderness: There is abdominal tenderness  Comments: Right flank tenderness no guarding or abdominal distention   Neurological:      Mental Status: She is alert

## 2023-01-03 ENCOUNTER — TELEPHONE (OUTPATIENT)
Dept: FAMILY MEDICINE CLINIC | Facility: HOSPITAL | Age: 63
End: 2023-01-03

## 2023-01-09 ENCOUNTER — TELEPHONE (OUTPATIENT)
Dept: FAMILY MEDICINE CLINIC | Facility: HOSPITAL | Age: 63
End: 2023-01-09

## 2023-01-09 DIAGNOSIS — M25.551 RIGHT HIP PAIN: ICD-10-CM

## 2023-01-09 DIAGNOSIS — R10.9 RIGHT FLANK PAIN: Primary | ICD-10-CM

## 2023-01-10 DIAGNOSIS — M79.601 PAIN OF RIGHT UPPER EXTREMITY: ICD-10-CM

## 2023-01-10 RX ORDER — TRAMADOL HYDROCHLORIDE 50 MG/1
50 TABLET ORAL EVERY 8 HOURS PRN
Qty: 60 TABLET | Refills: 0 | Status: SHIPPED | OUTPATIENT
Start: 2023-01-10

## 2023-01-17 ENCOUNTER — APPOINTMENT (EMERGENCY)
Dept: RADIOLOGY | Facility: HOSPITAL | Age: 63
End: 2023-01-17

## 2023-01-17 ENCOUNTER — HOSPITAL ENCOUNTER (EMERGENCY)
Facility: HOSPITAL | Age: 63
Discharge: HOME | End: 2023-01-17
Attending: EMERGENCY MEDICINE

## 2023-01-17 VITALS
HEART RATE: 88 BPM | OXYGEN SATURATION: 98 % | DIASTOLIC BLOOD PRESSURE: 83 MMHG | BODY MASS INDEX: 35.02 KG/M2 | SYSTOLIC BLOOD PRESSURE: 175 MMHG | HEIGHT: 67 IN | RESPIRATION RATE: 18 BRPM | TEMPERATURE: 98.1 F | WEIGHT: 223.11 LBS

## 2023-01-17 DIAGNOSIS — R10.9 RIGHT FLANK PAIN: Primary | ICD-10-CM

## 2023-01-17 LAB
ALBUMIN SERPL BCP-MCNC: 3.6 G/DL (ref 3.5–5)
ALP SERPL-CCNC: 201 U/L (ref 46–116)
ALT SERPL W P-5'-P-CCNC: 23 U/L (ref 12–78)
ANION GAP SERPL CALCULATED.3IONS-SCNC: 8 MMOL/L (ref 4–13)
AST SERPL W P-5'-P-CCNC: 19 U/L (ref 5–45)
BACTERIA UR QL AUTO: NORMAL /HPF
BASOPHILS # BLD AUTO: 0.05 THOUSANDS/ÂΜL (ref 0–0.1)
BASOPHILS NFR BLD AUTO: 1 % (ref 0–1)
BILIRUB SERPL-MCNC: 0.2 MG/DL (ref 0.2–1)
BILIRUB UR QL STRIP: NEGATIVE
BUN SERPL-MCNC: 37 MG/DL (ref 5–25)
CALCIUM SERPL-MCNC: 9.3 MG/DL (ref 8.3–10.1)
CHLORIDE SERPL-SCNC: 108 MMOL/L (ref 96–108)
CLARITY UR: ABNORMAL
CO2 SERPL-SCNC: 25 MMOL/L (ref 21–32)
COLOR UR: COLORLESS
CREAT SERPL-MCNC: 2.35 MG/DL (ref 0.6–1.3)
EOSINOPHIL # BLD AUTO: 0.32 THOUSAND/ÂΜL (ref 0–0.61)
EOSINOPHIL NFR BLD AUTO: 6 % (ref 0–6)
ERYTHROCYTE [DISTWIDTH] IN BLOOD BY AUTOMATED COUNT: 12.3 % (ref 11.6–15.1)
GFR SERPL CREATININE-BSD FRML MDRD: 21 ML/MIN/1.73SQ M
GLUCOSE SERPL-MCNC: 98 MG/DL (ref 65–140)
GLUCOSE UR STRIP-MCNC: NEGATIVE MG/DL
HCT VFR BLD AUTO: 37.3 % (ref 34.8–46.1)
HGB BLD-MCNC: 11.5 G/DL (ref 11.5–15.4)
HGB UR QL STRIP.AUTO: NEGATIVE
IMM GRANULOCYTES # BLD AUTO: 0.01 THOUSAND/UL (ref 0–0.2)
IMM GRANULOCYTES NFR BLD AUTO: 0 % (ref 0–2)
KETONES UR STRIP-MCNC: NEGATIVE MG/DL
LEUKOCYTE ESTERASE UR QL STRIP: ABNORMAL
LIPASE SERPL-CCNC: 205 U/L (ref 73–393)
LYMPHOCYTES # BLD AUTO: 1.08 THOUSANDS/ÂΜL (ref 0.6–4.47)
LYMPHOCYTES NFR BLD AUTO: 21 % (ref 14–44)
MCH RBC QN AUTO: 32.3 PG (ref 26.8–34.3)
MCHC RBC AUTO-ENTMCNC: 30.8 G/DL (ref 31.4–37.4)
MCV RBC AUTO: 105 FL (ref 82–98)
MONOCYTES # BLD AUTO: 0.45 THOUSAND/ÂΜL (ref 0.17–1.22)
MONOCYTES NFR BLD AUTO: 9 % (ref 4–12)
NEUTROPHILS # BLD AUTO: 3.17 THOUSANDS/ÂΜL (ref 1.85–7.62)
NEUTS SEG NFR BLD AUTO: 63 % (ref 43–75)
NITRITE UR QL STRIP: NEGATIVE
NON-SQ EPI CELLS URNS QL MICRO: NORMAL /HPF
NRBC BLD AUTO-RTO: 0 /100 WBCS
PH UR STRIP.AUTO: 6 [PH]
PLATELET # BLD AUTO: 225 THOUSANDS/UL (ref 149–390)
PMV BLD AUTO: 10 FL (ref 8.9–12.7)
POTASSIUM SERPL-SCNC: 4.9 MMOL/L (ref 3.5–5.3)
PROT SERPL-MCNC: 7.1 G/DL (ref 6.4–8.4)
PROT UR STRIP-MCNC: ABNORMAL MG/DL
RBC # BLD AUTO: 3.56 MILLION/UL (ref 3.81–5.12)
RBC #/AREA URNS AUTO: NORMAL /HPF
SODIUM SERPL-SCNC: 141 MMOL/L (ref 135–147)
SP GR UR STRIP.AUTO: 1.01 (ref 1–1.03)
UROBILINOGEN UR STRIP-ACNC: <2 MG/DL
WBC # BLD AUTO: 5.08 THOUSAND/UL (ref 4.31–10.16)
WBC #/AREA URNS AUTO: NORMAL /HPF

## 2023-01-17 RX ORDER — METHOCARBAMOL 500 MG/1
500 TABLET, FILM COATED ORAL 3 TIMES DAILY
Qty: 20 TABLET | Refills: 0 | Status: SHIPPED | OUTPATIENT
Start: 2023-01-17 | End: 2023-01-23 | Stop reason: SDUPTHER

## 2023-01-17 RX ORDER — LIDOCAINE 50 MG/G
1 PATCH TOPICAL ONCE
Status: DISCONTINUED | OUTPATIENT
Start: 2023-01-17 | End: 2023-01-17 | Stop reason: HOSPADM

## 2023-01-17 RX ADMIN — LIDOCAINE 5% 1 PATCH: 700 PATCH TOPICAL at 12:53

## 2023-01-17 NOTE — PSYCH
[facility]/Hospital: [address] [facility]   [address] [facility]    Psychiatric Progress Note  MRN#: [id number]  Felicita Guerrero 63 y o  female    This note was not shared with the patient due to reasonable likelihood of causing patient harm   This Patient was seen in the office today at [facility] location  Subjective:Ayleen reported diagnoses of Bipolar Disorder and OCD  Today, stated sleep as " not well" due to anxiety , she also has hypertension  Complained about living with the sister and that's not the healthy atmosphere  Assoicated symptoms of restlessness although tiredness; tries deep breathing -sometime effective  Denies SI /HI   Mood also depressed,    Alessandro Balderrama denied hanh -Described her hanh as "  impulsive- 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 cause of it;  Very hyper , then down, isolative, some paranoia , about other talking about her  -  Not feeling good enough  Prior Med trials were list was provided- reported most were boosters  History of ECT x 30 yrs ago- did not work   Effexor   Imprimine  Lithium- can't take due to kidney dz  Clonapine   Clonapin  Rispridone-   Tofinail  Zanxax   Zoloft- did not work  Buspar - did not work   Fluxomine-   Prozac   Trazodone      Medication compliance : Alessandro Balderrama today , reported dose of Seroquel as 100mg, 300mg, 400mg;  Lamotrigine 200mg x 2; Klonpine  0 5mg x 3 daily; Luvox  200mg x 2 daily  Medication side effects:none      Social Hx:  Occupational history : SSDI since 1996 due to mental health  Legal hx: long-term x once - for shoplifting     Mental Status Evaluation:  General Appearance:  Felicita Guerrero is a 63 y o    female casually dressed, adequate hygiene and grooming, looks stated age, Wearing glasses    Behavior:  pleasant, cooperative, calm, intermittent eye contact Restlessness   Speech:  Pressured; WNL, rhythm, volume, latency, amount   Mood:  anxious and excited   Affect:  normal and slight frustation   Thought Process:  circumstantial, disorganized and logical   Thought Content:  no overt delusions, normal   Perceptual Disturbances: Does not appear responding or preoccupied   Delusions  No   Risk Potential: Suicidal Ideations No  Homicidal Ideations No  Potential for Aggression No     Sensorium:  Oriented to person, place, time/date and situation   Memory:  recent and remote memory grossly intact   Consciousness:  alert and awake   Attention: attention span and concentration are age appropriate   Insight:  fair   Judgment: fair   Gait/Station: normal   Motor Activity: no abnormal movements     There were no vitals filed for this visit       Medications:   Current Outpatient Medications on File Prior to Visit   Medication Sig Dispense Refill   • acetaminophen (TYLENOL) 650 mg CR tablet Take 1 tablet (650 mg total) by mouth every 8 (eight) hours as needed for mild pain 30 tablet 0   • albuterol (Ventolin HFA) 90 mcg/act inhaler Inhale 2 puffs every 6 (six) hours as needed for wheezing or shortness of breath (Patient not taking: Reported on 2022) 8 5 g 3   • AMILoride 5 mg tablet Take 1 tablet (5 mg total) by mouth 2 (two) times a day 180 tablet 3   • amLODIPine (NORVASC) 5 mg tablet Take 1 tablet (5 mg total) by mouth daily 30 tablet 3   • aspirin 81 mg chewable tablet Chew 1 tablet (81 mg total) daily     • carvedilol (COREG) 25 mg tablet Take 1 tablet (25 mg total) by mouth 2 (two) times a day with meals 180 tablet 3   • cholecalciferol (VITAMIN D3) 1,000 units tablet Take 5 tablets (5,000 Units total) by mouth daily 90 tablet 5   • clonazePAM (KlonoPIN) 0 5 mg tablet Take 1 tablet (0 5 mg total) by mouth 3 (three) times a day Do not start before 2022  270 tablet 0   • fluvoxaMINE (LUVOX) 100 mg tablet Fluvoxamine 100m tablet in the morning ; 2 tablets at night 270 tablet 0   • lamoTRIgine (LaMICtal) 200 MG tablet Lamotrigine 200m tablet po in the morning + 1 tablet po at night 180 tablet 0   • linaCLOtide (Linzess) 290 MCG CAPS Take 1 capsule by mouth daily 90 capsule 0   • methocarbamol (ROBAXIN) 500 mg tablet Take 1 tablet (500 mg total) by mouth 3 (three) times a day 20 tablet 0   • omeprazole (PriLOSEC) 40 MG capsule Take 1 capsule (40 mg total) by mouth daily before breakfast 90 capsule 3   • ondansetron (ZOFRAN) 4 mg tablet Take 1 tablet (4 mg total) by mouth every 8 (eight) hours as needed for nausea or vomiting 60 tablet 1   • QUEtiapine (SEROquel) 100 mg tablet Quetiapine 100mg : 1 tablet + 400mg po at night 90 tablet 0   • QUEtiapine (SEROquel) 300 mg tablet Quetiapine 300m tablet po  in morning 90 tablet 0   • QUEtiapine (SEROquel) 400 MG tablet Quetiapine 400m tablet + 100mg tablet po at night 90 tablet 0   • rosuvastatin (CRESTOR) 20 MG tablet Take 1 tablet (20 mg total) by mouth every evening 90 tablet 3   • traMADol (Ultram) 50 mg tablet Take 1 tablet (50 mg total) by mouth every 8 (eight) hours as needed for severe pain 60 tablet 0     Current Facility-Administered Medications on File Prior to Visit   Medication Dose Route Frequency Provider Last Rate Last Admin   • [DISCONTINUED] lidocaine (LIDODERM) 5 % patch 1 patch  1 patch Topical Once Gentry Ward PA-C   1 patch at 23 1253        Labs: I have personally reviewed all pertinent laboratory/tests results     Most Recent Labs:   Lab Results   Component Value Date    WBC 5 08 2023    RBC 3 56 (L) 2023    HGB 11 5 2023    HCT 37 3 2023     2023    RDW 12 3 2023    NEUTROABS 3 17 2023    SODIUM 141 2023    K 4 9 2023     2023    CO2 25 2023    BUN 37 (H) 2023    CREATININE 2 35 (H) 2023    GLUC 98 2023    GLUF 125 (H) 2022    CALCIUM 9 3 2023    AST 19 2023    ALT 23 2023    ALKPHOS 201 (H) 2023    TP 7 1 01/17/2023    ALB 3 6 01/17/2023    TBILI 0 20 01/17/2023    CHOLESTEROL 263 (H) 08/31/2022    HDL 48 (L) 08/31/2022    TRIG 358 (H) 08/31/2022    LDLCALC 143 (H) 08/31/2022    [place] 215 08/31/2022    AMMONIA 17 02/17/2018    [id number] 2 890 05/26/2021    FREET4 0 57 (L) 06/12/2019    HGBA1C 5 4 03/24/2020     03/24/2020     ________________________________________________________________________    A/P  Stephan Joel is a 82-year-old  female, history of multiple hospitalizations, several suicide attempts, anorexia, OCD, bipolar disorder, presented w/ generalized anxiety due to situational stressors linked to home environment  Today, with findings of bipolar hypomania, unsure duration , otherwise there's anxiety; while high dose of Lamotrigine, Quetiapine  DSM5  Generalized anxiety disorder  Unspecified, Bipolar Disorder   Obsessive-compulsive disorder, compulsive           PLAN:    · Discussed diagnostic impression based on history, external records,  clinical findings regarding anxiety and hanh/hypo  · WNL CBC, electrolytes, high BUN/Cr- has kidney dz, high lipids- h/o HLD  · Started Abilify 5 mg    · Seroquel 300 in the morning, 500 at night  · Lamotrigine 200 mg twice daily  · Luvox 100 mg in the morning and 200 at night      Medications Prescribed During This Encounter:    ARIPiprazole (ABILIFY) 5 mg tablet, Aripiprazole 5 mg : 1 tablet po daily, Disp: 30 tablet, Rfl: 2      Treatement: Risks, benefits, and possible side effects of medications explained to patient and patient verbalizes understanding  Next Appointment: 4 wks     Today's Appointment@ [facility]  Face To Face:  10:42 AM -11:15 AM;Documentation Time:  4:38 PM- 4:56 PM     Encounter Duration: Time Spent 33 minutes with Patient  Greater than 50% of total time was spent with the patient     MDM  Number of Diagnoses or Management Options  Bipolar disorder, unspecified ([facility] [address] ): new, needed workup  Generalized anxiety disorder: established, worsening     Amount and/or Complexity of Data Reviewed  Clinical lab tests: reviewed  Review and summarize past medical records: yes    Risk of Complications, Morbidity, and/or Mortality  Presenting problems: moderate  Management options: moderate

## 2023-01-17 NOTE — PATIENT INSTRUCTIONS
Madyson Mejia [id number]   Thanks for presenting to today's Appointment at [facility]   Instructions are as listed:  Abilify was started at 5 mg daily   Clonazepem increased to 1 tablet in the morning, 1 tablet in the afternoon and 2 tablet at night   Was not prescribed today

## 2023-01-17 NOTE — ED PROVIDER NOTES
History  Chief Complaint   Patient presents with   • Flank Pain     Patient complaint of right flank pain x 1 month      Patient is a 64 y/o F with h/o anxiety, chronic back pain, hTN that presents to the ED with right lower back pain x 1 month  She states she had a CT scan for this problem to r/o kidney stone and was told she had constipation  I reviewed the CT scan from , no kidney stone, patient with constipation  She had about 20 tablets in her colon  She states she has been taking miralax and colace and has been having normal bowel movements, but continues with the pain  The pain is worse with deep breaths and movement  No bowel/bladder dysfunction  No weakness or numbness  History provided by:  Patient  Flank Pain  Pain location:  R flank  Pain quality: aching    Pain radiates to:  Groin  Pain severity:  Moderate  Onset quality:  Gradual  Duration: 1 month  Timing:  Constant  Progression:  Unchanged  Chronicity:  New  Context: not sick contacts, not suspicious food intake and not trauma    Relieved by:  Nothing  Worsened by: Movement and deep breathing  Ineffective treatments:  None tried  Associated symptoms: constipation    Associated symptoms: no anorexia, no chest pain, no chills, no cough, no diarrhea, no dysuria, no fever, no nausea, no shortness of breath and no vomiting    Risk factors: obesity        Prior to Admission Medications   Prescriptions Last Dose Informant Patient Reported? Taking?    AMILoride 5 mg tablet   No No   Sig: Take 1 tablet (5 mg total) by mouth 2 (two) times a day   QUEtiapine (SEROquel) 100 mg tablet   No No   Sig: Quetiapine 100mg : 1 tablet + 400mg po at night   QUEtiapine (SEROquel) 300 mg tablet   No No   Sig: Quetiapine 300m tablet po  in morning   QUEtiapine (SEROquel) 400 MG tablet   No No   Sig: Quetiapine 400m tablet + 100mg tablet po at night   acetaminophen (TYLENOL) 650 mg CR tablet   No No   Sig: Take 1 tablet (650 mg total) by mouth every 8 (eight) hours as needed for mild pain   albuterol (Ventolin HFA) 90 mcg/act inhaler   No No   Sig: Inhale 2 puffs every 6 (six) hours as needed for wheezing or shortness of breath   Patient not taking: Reported on 2022   amLODIPine (NORVASC) 5 mg tablet   No No   Sig: Take 1 tablet (5 mg total) by mouth daily   aspirin 81 mg chewable tablet   No No   Sig: Chew 1 tablet (81 mg total) daily   carvedilol (COREG) 25 mg tablet   No No   Sig: Take 1 tablet (25 mg total) by mouth 2 (two) times a day with meals   cholecalciferol (VITAMIN D3) 1,000 units tablet   No No   Sig: Take 5 tablets (5,000 Units total) by mouth daily   clonazePAM (KlonoPIN) 0 5 mg tablet   No No   Sig: Take 1 tablet (0 5 mg total) by mouth 3 (three) times a day Do not start before 2022     fluvoxaMINE (LUVOX) 100 mg tablet   No No   Sig: Fluvoxamine 100m tablet in the morning ; 2 tablets at night   lamoTRIgine (LaMICtal) 200 MG tablet   No No   Sig: Lamotrigine 200m tablet po in the morning + 1 tablet po at night   linaCLOtide (Linzess) 290 MCG CAPS   No No   Sig: Take 1 capsule by mouth daily   omeprazole (PriLOSEC) 40 MG capsule   No No   Sig: Take 1 capsule (40 mg total) by mouth daily before breakfast   ondansetron (ZOFRAN) 4 mg tablet   No No   Sig: Take 1 tablet (4 mg total) by mouth every 8 (eight) hours as needed for nausea or vomiting   rosuvastatin (CRESTOR) 20 MG tablet   No No   Sig: Take 1 tablet (20 mg total) by mouth every evening   traMADol (Ultram) 50 mg tablet   No No   Sig: Take 1 tablet (50 mg total) by mouth every 8 (eight) hours as needed for severe pain      Facility-Administered Medications: None       Past Medical History:   Diagnosis Date   • Anxiety    • Anxiety disorder    • Arthritis    • At risk for falls    • Bipolar 2 disorder (HCC)    • Chronic back pain    • Chronic kidney disease    • Closed fracture of distal end of right fibula with routine healing 2020   • COVID-19     in     • CVA (cerebral vascular accident) [facility])     noted on MRI in the past   • Depression    • GERD (gastroesophageal reflux disease)    • Hypercholesteremia    • Hypernatremia    • Hypertension    • Hypokalemia    • Idiopathic [facility] 2018   • Intervertebral disc disorder with radiculopathy of lumbosacral region     resolved: 2015   • Kidney disease    • Limb alert care status     LUE-fistula   • Panic attacks    • Pericardial effusion    • PONV (postoperative nausea and vomiting)    • Psychiatric problem    • Radiculitis     resolved: 2015   • Secondary renal hyperparathyroidism ([facility] )    • Stroke [facility])    • Vitamin D deficiency        Past Surgical History:   Procedure Laterality Date   • BUNIONECTOMY      Left foot    • CAST APPLICATION Right     Procedure: Application short-arm thumb spica splint;  Surgeon: Shara Ventura MD;  Location:  MAIN OR;  Service: Orthopedics   • COLON SURGERY     • COLONOSCOPY  2021   • DILATION AND CURETTAGE OF UTERUS     • INDUCED       surgically induced   • WI ARTERIOVENOUS ANASTOMOSIS OPEN DIRECT Left 2019    Procedure: CREATION FISTULA ARTERIOVENOUS (AV) left wrists possible left upper;  Surgeon: Maria Eugenia Saenz MD;  Location: [facility] MAIN OR;  Service: Vascular   • WI CORRJ [address] W/SESMDC W/DIST Elisabet Stewatr Left 2019    Procedure: Igor Godinezippel;  Surgeon: Marie Alvarado DPM;  Location:  MAIN OR;  Service: Podiatry   • WI CORRJ [address] W/SESMDC W/DIST Elisabet Stewart Right 8/3/2020    Procedure: Nakul Cavazos;  Surgeon: Marie Alvarado DPM;  Location: [facility] MAIN [facility];  Service: Podiatry   • WI CORRJ [address] W/SESMDC W/PROX PHLNX OSTEOT Right 2021    Procedure: Rafia Corners, right tameka osteotomy and 2nd claw toe correction;  Surgeon: Clarence Rayo MD;  Location: [facility] MA[facility] OR;  Service: Orthopedics   • WI ERCP DX COLLECTION SPECIMEN BRUSHING/WASHING N/A 2018 Procedure: ENDOSCOPIC RETROGRADE CHOLANGIOPANCREATOGRAPHY (ERCP); Surgeon: Harshad Duarte MD;  Location: [facility] MAIN OR;  Service: Gastroenterology   • NM LAPAROSCOPY PROCTOPEXY PROLAPSE N/A 7/13/2018    Procedure: ROBOTIC SIGMOID RESECTION / RECTOPEXY;  Surgeon: Katia Dowling MD;  Location: [facility] MAIN OR;  Service: Colorectal   • NM OPEN TREATMENT RADIAL SHAFT FRACTURE Right 10/11/2021    Procedure: OPEN REDUCTION W/ INTERNAL FIXATION (ORIF) RADIUS (WRIST), RIGHT DISTAL;  Surgeon: Nadya Koo MD;  Location: [facility] MA[facility] OR;  Service: Orthopedics   • NM REMOVAL IMPLANT DEEP Right 6/23/2022    Procedure: Removal of hardware volar aspect right distal radius (distal radial plate and screws);   Surgeon: Shantal Matt MD;  Location: [facility];  Service: Orthopedics   • NM SIGMOIDOSCOPY FLX DX W/COLLJ SPEC BR/WA IF PFRMD N/A 7/13/2018    Procedure: Joan Wagner;  Surgeon: Katia Dowling MD;  Location: [facility] OR;  Service: Colorectal   • NM TR TDN RESTORE INTRNSC FUNCJ ALL 4 FNGRS Right 6/23/2022    Procedure: Right ring finger flexor digitorum superficialis to flexor pollicis longus tendon transfer;  Surgeon: Shantal Matt MD;  Location: [facility];  Service: Orthopedics   • TUBAL LIGATION Bilateral 1997   • Donnie [address] THYROID BIOPSY  7/30/2019       Family History   Problem Relation Age of Onset   • Bipolar disorder Mother    • Mental illness Mother         depression   • Stroke Mother    • Dementia Mother    • Colon polyps Mother    • Heart disease Father    • Hypertension Father    • Diabetes Father    • Other Family         Back disorder   • Diabetes Family    • Heart disease Family    • Hypertension Family    • Stroke Family    • Thyroid disease Family    • Breast cancer Paternal Grandmother         age unknown   • Breast cancer Paternal Aunt         age unknown   • Breast cancer Maternal Aunt         age unknown   • Mental illness Sister    • Colon polyps Sister    • Mental illness Sister    • Heart disease Sister    • No Known Problems Sister    • Breast cancer Sister 76   • Other Son         pituitary tumor   • Hypertension Son    • Obesity Son    • No Known Problems Son    • No Known Problems Maternal Grandmother    • No Known Problems Maternal Grandfather    • No Known Problems Paternal Grandfather    • Breast cancer Paternal Aunt         age unknown   • Substance Abuse Neg Hx         neg fam hx   • Colon cancer Neg Hx      I have reviewed and agree with the history as documented  E-Cigarette/Vaping   • E-Cigarette Use Never User      E-Cigarette/Vaping Substances   • Nicotine No    • THC No    • CBD No    • Flavoring No    • Other No    • Unknown No      Social History     Tobacco Use   • Smoking status: Never   • Smokeless tobacco: Never   Vaping Use   • Vaping Use: Never used   Substance Use Topics   • Alcohol use: Never   • Drug use: Not Currently       Review of Systems   Constitutional: Negative for chills and fever  HENT: Negative  Respiratory: Negative for cough and shortness of breath  Cardiovascular: Negative for chest pain, palpitations and leg swelling  Gastrointestinal: Positive for constipation  Negative for abdominal pain, anorexia, diarrhea, nausea and vomiting  Genitourinary: Positive for flank pain  Negative for dysuria  Musculoskeletal: Positive for back pain  Negative for neck pain  Skin: Negative for color change, pallor and rash  Neurological: Negative for dizziness, weakness, light-headedness and numbness  Psychiatric/Behavioral: Negative for confusion  All other systems reviewed and are negative  Physical Exam  Physical Exam  Vitals and nursing note reviewed  Constitutional:       General: She is not in acute distress  Appearance: Normal appearance  She is well-developed, well-groomed and overweight  She is not ill-appearing or diaphoretic  HENT:      Head: Normocephalic and atraumatic        Right Ear: External ear normal       Left Ear: External ear normal       Nose: Nose normal       Mouth/Throat:      Mouth: Mucous membranes are moist       Pharynx: Oropharynx is clear  Eyes:      Conjunctiva/sclera: Conjunctivae normal       Pupils: Pupils are equal    Cardiovascular:      Rate and Rhythm: Normal rate and regular rhythm  Heart sounds: Normal heart sounds  Pulmonary:      Effort: Pulmonary effort is normal       Breath sounds: Normal breath sounds  No wheezing, rhonchi or rales  Abdominal:      General: Abdomen is flat  Bowel sounds are normal       Palpations: Abdomen is soft  Tenderness: There is no abdominal tenderness  There is no right CVA tenderness or left CVA tenderness  Musculoskeletal:         General: Normal range of motion  Cervical back: Normal range of motion  Right lower leg: No edema  Left lower leg: No edema  Skin:     General: Skin is warm and dry  Coloration: Skin is not jaundiced or pale  Findings: No rash  Neurological:      General: No focal deficit present  Mental Status: She is alert and oriented to person, place, and time  Motor: No weakness  Psychiatric:         Mood and Affect: Mood normal          Behavior: Behavior is cooperative           Vital Signs  ED Triage Vitals [01/17/23 1213]   Temperature Pulse Respirations Blood Pressure SpO2   98 1 °F (36 7 °C) 88 18 (!) 175/83 98 %      Temp src Heart Rate Source Patient Position - Orthostatic VS BP Location FiO2 (%)   -- -- -- -- --      Pain Score       --           Vitals:    01/17/23 1213   BP: (!) 175/83   Pulse: 88         Visual Acuity      ED Medications  Medications - No data to display    Diagnostic Studies  Results Reviewed     Procedure Component Value Units Date/Time    Urine Microscopic [[id number]]  (Normal) Collected: 01/17/23 1253    Lab Status: Final result Specimen: Urine, Clean Catch Updated: 01/17/23 1328     RBC, UA 0-1 /hpf      WBC, UA 1-2 /hpf      Epithelial Cells Occasional /hpf Bacteria, UA None Seen /hpf     UA w Reflex to Microscopic w Reflex to Culture [[id number]]  (Abnormal) Collected: 01/17/23 1253    Lab Status: Final result Specimen: Urine, Clean Catch Updated: 01/17/23 1317     Color, UA Colorless     Clarity, UA Hazy     Specific [place], UA 1 015     pH, UA 6 0     Leukocytes, UA Trace     Nitrite, UA Negative     Protein, UA Trace mg/dl      Glucose, UA Negative mg/dl      Ketones, UA Negative mg/dl      Urobilinogen, UA <2 0 mg/dl      Bilirubin, UA Negative     Occult Blood, UA Negative    Comprehensive metabolic panel [[id number]]  (Abnormal) Collected: 01/17/23 1221    Lab Status: Final result Specimen: Blood from Hand, Right Updated: 01/17/23 1249     Sodium 141 mmol/L      Potassium 4 9 mmol/L      Chloride 108 mmol/L      CO2 25 mmol/L      ANION GAP 8 mmol/L      BUN 37 mg/dL      Creatinine 2 35 mg/dL      Glucose 98 mg/dL      Calcium 9 3 mg/dL      AST 19 U/L      ALT 23 U/L      Alkaline Phosphatase 201 U/L      Total Protein 7 1 g/dL      Albumin 3 6 g/dL      Total Bilirubin 0 20 mg/dL      eGFR 21 ml/min/1 73sq m     Narrative:      [place] guidelines for Chronic Kidney Disease (CKD):   •  Stage 1 with normal or high GFR (GFR > 90 mL/min/1 73 square meters)  •  Stage 2 Mild CKD (GFR = 60-89 mL/min/1 73 square meters)  •  Stage 3A Moderate CKD (GFR = 45-59 mL/min/1 73 square meters)  •  Stage 3B Moderate CKD (GFR = 30-44 mL/min/1 73 square meters)  •  Stage 4 Severe CKD (GFR = 15-29 mL/min/1 73 square meters)  •  Stage 5 End Stage CKD (GFR <15 mL/min/1 73 square meters)  Note: GFR calculation is accurate only with a steady state creatinine    Lipase [[id number]]  (Normal) Collected: 01/17/23 1221    Lab Status: Final result Specimen: Blood from Hand, Right Updated: 01/17/23 1249     Lipase 205 u/L     CBC and differential [[id number]]  (Abnormal) Collected: 01/17/23 1221    Lab Status: Final result Specimen: Blood from Hand, Right Updated: 01/17/23 1229     WBC 5 08 Thousand/uL      RBC 3 56 Million/uL      Hemoglobin 11 5 g/dL      Hematocrit 37 3 %       fL      MCH 32 3 pg      MCHC 30 8 g/dL      RDW 12 3 %      MPV 10 0 fL      Platelets 648 Thousands/uL      nRBC 0 /100 WBCs      Neutrophils Relative 63 %      Immat GRANS % 0 %      Lymphocytes Relative 21 %      Monocytes Relative 9 %      Eosinophils Relative 6 %      Basophils Relative 1 %      Neutrophils Absolute 3 17 Thousands/µL      Immature Grans Absolute 0 01 Thousand/uL      Lymphocytes Absolute 1 08 Thousands/µL      Monocytes Absolute 0 45 Thousand/µL      Eosinophils Absolute 0 32 Thousand/µL      Basophils Absolute 0 05 Thousands/µL                  XR chest 1 view portable   Final Result by Pearl Burnett MD (01/17 1343)      No acute cardiopulmonary disease  Findings are stable            Workstation performed: [id number]                    Procedures  Procedures         ED Course                                             Medical Decision Making  Patient with right flank pain, worse with movement and palpation, better with rest, most likely musculoskeletal   Patient had a CT scan to evaluate this, no kidney stone, but is constipated  Will check UA to r/o UTI/pyelonephritis  No abnormalities on labs  Will prescribe muscle relaxant and advised f/u with PCP for recheck  Right flank pain: complicated acute illness or injury  Amount and/or Complexity of Data Reviewed  External Data Reviewed: radiology  Details: CT scan  Labs: ordered  Radiology: ordered  Risk  Prescription drug management            Disposition  Final diagnoses:   Right flank pain     Time reflects when diagnosis was documented in both MDM as applicable and the Disposition within this note     Time User Action Codes Description Comment    1/17/2023  1:59 PM Kenan Davis Add [R10 9] Right flank pain       ED Disposition     ED Disposition   Discharge    Condition   Stable Date/Time   Tue Jan 17, 2023  1:52 PM    Comment   [facility] discharge to home/self care                 Follow-up Information     Follow up With Specialties Details Why Contact Info Additional [address],  Internal Medicine Call in 3 days For recheck R Arian Bailey [phone]        [address] Emergency Department Emergency Medicine Go to  If symptoms worsen [address] [place], [address]  [address] [facility] Emergency Department, [address] [address]          Discharge Medication List as of 1/17/2023  2:01 PM      START taking these medications    Details   methocarbamol (ROBAXIN) 500 mg tablet Take 1 tablet (500 mg total) by mouth 3 (three) times a day, Starting Tue 1/17/2023, Normal         CONTINUE these medications which have NOT CHANGED    Details   acetaminophen (TYLENOL) 650 mg CR tablet Take 1 tablet (650 mg total) by mouth every 8 (eight) hours as needed for mild pain, Starting u 6/23/2022, Normal      albuterol (Ventolin HFA) 90 mcg/act inhaler Inhale 2 puffs every 6 (six) hours as needed for wheezing or shortness of breath, Starting Mon 5/3/2021, Normal      AMILoride 5 mg tablet Take 1 tablet (5 mg total) by mouth 2 (two) times a day, Starting Mon 1/3/2022, Normal      amLODIPine (NORVASC) 5 mg tablet Take 1 tablet (5 mg total) by mouth daily, Starting Wed 12/7/2022, Normal      aspirin 81 mg chewable tablet Chew 1 tablet (81 mg total) daily, Starting Tue 12/28/2021, No Print      carvedilol (COREG) 25 mg tablet Take 1 tablet (25 mg total) by mouth 2 (two) times a day with meals, Starting Mon 6/6/2022, Normal      cholecalciferol (VITAMIN D3) 1,000 units tablet Take 5 tablets (5,000 Units total) by mouth daily, Starting Mon 1/31/2022, Normal      clonazePAM (KlonoPIN) 0 5 mg tablet Take 1 tablet (0 5 mg total) by mouth 3 (three) times a day Do not start before 2022 , Starting 2022, Normal      fluvoxaMINE (LUVOX) 100 mg tablet Fluvoxamine 100m tablet in the morning ; 2 tablets at night, Normal      lamoTRIgine (LaMICtal) 200 MG tablet Lamotrigine 200m tablet po in the morning + 1 tablet po at night, Normal      linaCLOtide (Linzess) 290 MCG CAPS Take 1 capsule by mouth daily, Starting 2022, Until 2022, Normal      omeprazole (PriLOSEC) 40 MG capsule Take 1 capsule (40 mg total) by mouth daily before breakfast, Starting 2022, Normal      ondansetron (ZOFRAN) 4 mg tablet Take 1 tablet (4 mg total) by mouth every 8 (eight) hours as needed for nausea or vomiting, Starting 2022, Normal      !! QUEtiapine (SEROquel) 100 mg tablet Quetiapine 100mg : 1 tablet + 400mg po at night, Normal      !! QUEtiapine (SEROquel) 300 mg tablet Quetiapine 300m tablet po  in morning, Normal      !! QUEtiapine (SEROquel) 400 MG tablet Quetiapine 400m tablet + 100mg tablet po at night, Normal      rosuvastatin (CRESTOR) 20 MG tablet Take 1 tablet (20 mg total) by mouth every evening, Starting 2022, Normal      traMADol (Ultram) 50 mg tablet Take 1 tablet (50 mg total) by mouth every 8 (eight) hours as needed for severe pain, Starting Tue 1/10/2023, Normal       !! - Potential duplicate medications found  Please discuss with provider  No discharge procedures on file      PDMP Review       Value Time User    PDMP Reviewed  Yes 1/10/2023  7:01 PM Pablo Pyle, [facility] Provider  Electronically Signed by           Arie Henry PA-C  [phone]

## 2023-01-17 NOTE — DISCHARGE INSTRUCTIONS
Rest, heating pad to right flank  Take tylenol for discomfort  Robaxin 3 times a day for muscle pain  Follow up with family doctor in 3-4 days for recheck  Return to ER if symptoms worsen

## 2023-01-18 ENCOUNTER — OFFICE VISIT (OUTPATIENT)
Dept: OBGYN CLINIC | Facility: CLINIC | Age: 63
End: 2023-01-18

## 2023-01-18 VITALS
HEIGHT: 67 IN | BODY MASS INDEX: 35.16 KG/M2 | DIASTOLIC BLOOD PRESSURE: 82 MMHG | WEIGHT: 224 LBS | SYSTOLIC BLOOD PRESSURE: 140 MMHG

## 2023-01-18 DIAGNOSIS — M70.52 PES ANSERINUS BURSITIS OF BOTH KNEES: Primary | ICD-10-CM

## 2023-01-18 DIAGNOSIS — M17.12 PRIMARY OSTEOARTHRITIS OF LEFT KNEE: ICD-10-CM

## 2023-01-18 DIAGNOSIS — M70.51 PES ANSERINUS BURSITIS OF BOTH KNEES: Primary | ICD-10-CM

## 2023-01-18 DIAGNOSIS — M17.11 PRIMARY OSTEOARTHRITIS OF RIGHT KNEE: ICD-10-CM

## 2023-01-18 RX ORDER — ROPIVACAINE HYDROCHLORIDE 5 MG/ML
10 INJECTION, SOLUTION EPIDURAL; INFILTRATION; PERINEURAL
Status: COMPLETED | OUTPATIENT
Start: 2023-01-18 | End: 2023-01-18

## 2023-01-18 RX ORDER — BETAMETHASONE SODIUM PHOSPHATE AND BETAMETHASONE ACETATE 3; 3 MG/ML; MG/ML
12 INJECTION, SUSPENSION INTRA-ARTICULAR; INTRALESIONAL; INTRAMUSCULAR; SOFT TISSUE
Status: COMPLETED | OUTPATIENT
Start: 2023-01-18 | End: 2023-01-18

## 2023-01-18 RX ADMIN — ROPIVACAINE HYDROCHLORIDE 10 ML: 5 INJECTION, SOLUTION EPIDURAL; INFILTRATION; PERINEURAL at 14:01

## 2023-01-18 RX ADMIN — BETAMETHASONE SODIUM PHOSPHATE AND BETAMETHASONE ACETATE 12 MG: 3; 3 INJECTION, SUSPENSION INTRA-ARTICULAR; INTRALESIONAL; INTRAMUSCULAR; SOFT TISSUE at 14:12

## 2023-01-18 NOTE — PROGRESS NOTES
Assessment:     1  Pes anserinus bursitis of both knees    2  Primary osteoarthritis of left knee    3  Primary osteoarthritis of right knee        Plan:     Problem List Items Addressed This Visit        Musculoskeletal and Integument    Primary osteoarthritis of right knee    Primary osteoarthritis of left knee    Pes anserinus bursitis of both knees - Primary    Relevant Medications    ropivacaine (NAROPIN) 0 5 % injection 10 mL (Completed)    ropivacaine (NAROPIN) 0 5 % injection 10 mL (Completed)    betamethasone acetate-betamethasone sodium phosphate (CELESTONE) injection 12 mg (Completed)    betamethasone acetate-betamethasone sodium phosphate (CELESTONE) injection 12 mg (Completed)    Other Relevant Orders    Large joint arthrocentesis: bilateral anserine bursa (Completed)     Findings today are consistent with mild bilateral knee osteoarthritis with bilateral pes anserine bursitis  Imaging and prognosis was reviewed with the patient today  Discussed with patient that the pes anserine bursa is the most symptomatic at this time  Cortisone steroid injection was administered today to bilateral pes anserine bursa  Patient should avoid strenuous activities for the next 1-2 days  Patient should avoid vaccines for the next 2 weeks if possible  If patient is diabetic, should monitor glucose levels for the next 7 to 10 days  Can apply cold compress for soreness  If patient feels relief with the cortisone injections, procedure can be repeated every 3 months  Patient would benefit from low impact exercises such as stationary biking, swimming and flat surface walking  She can continue with tylenol and Voltaren gel as needed  Follow up as needed  All patient's questions were answered to her satisfaction  This note is created using dictation transcription  It may contain typographical errors, grammatical errors, improperly dictated words, background noise and other errors      Subjective:     Patient ID: Janice Gan Sylvester Davidson is a 63 y o  female  Chief Complaint:  63year old female patient presents today for a follow up of bilateral knee pain- osteoarthritis  Pain has been increased over the last couple of months  Patient was last seen for her knees on 9/2/2021  Pain is describes the pain as throbbing  It is constant in timing, and localizes the pain to the anterior knee  The pain is worse with walking, standing, ascending stairs, descending stairs and going from a seated to standing positions and relieved with rest, medication: Tylenol used and beneficial, avoiding painful activities  She denies mechanical symptoms such as locking and catching  She admits to instability of the knee    Patient has tried cortisone steroid injection (2/12/2021) , VISCO injection (5/27/21) and PT (2 years ago)     Allergy:  Allergies   Allergen Reactions   • Pollen Extract Nasal Congestion     Medications:  all current active meds have been reviewed  Past Medical History:  Past Medical History:   Diagnosis Date   • Anxiety    • Anxiety disorder    • Arthritis    • At risk for falls    • Bipolar 2 disorder ([place] [address] )    • Chronic back pain    • Chronic kidney disease    • Closed fracture of distal end of right fibula with routine healing 11/04/2020   • COVID-19     in Jan 2021   • CVA (cerebral vascular accident) [facility])     noted on MRI in the past   • Depression    • GERD (gastroesophageal reflux disease)    • Hypercholesteremia    • Hypernatremia    • Hypertension    • Hypokalemia    • Idiopathic chronic pancreatitis ([facility] ) 03/20/2018   • Intervertebral disc disorder with radiculopathy of lumbosacral region     resolved: 12/28/2015   • Kidney disease    • Limb alert care status     LUE-fistula   • Panic attacks    • Pericardial effusion    • PONV (postoperative nausea and vomiting)    • Psychiatric problem    • Radiculitis     resolved: 12/28/2015   • Secondary renal hyperparathyroidism ([place] [address] )    • Stroke [facility])    • Vitamin D deficiency      Past Surgical History:  Past Surgical History:   Procedure Laterality Date   • BUNIONECTOMY      Left foot    • CAST APPLICATION Right 4096    Procedure: Application short-arm thumb spica splint;  Surgeon: Mary Austin MD;  Location: [facility];  Service: Orthopedics   • COLON SURGERY     • COLONOSCOPY  2021   • DILATION AND CURETTAGE OF UTERUS     • INDUCED       surgically induced   • VT ARTERIOVENOUS ANASTOMOSIS OPEN DIRECT Left 2019    Procedure: CREATION FISTULA ARTERIOVENOUS (AV) left wrists possible left upper;  Surgeon: Lalit Rosales MD;  Location: [facility];  Service: Vascular   • VT CORRJ [address] W/SESMDC W/DIST [place] Guardian Left 2019    Procedure: Cori Herter;  Surgeon: Marrie Holstein, DPM;  Location: [facility];  Service: Podiatry   • Piroska U  97  W/SESMDC W/DIST [place] Guardian Right 8/3/2020    Procedure: Carla Nicks;  Surgeon: Marrie Holstein, DPM;  Location: [facility];  Service: Podiatry   • VT CORRJ HALLUX VALGUS W/SESMDC W/PROX PHLNX OSTEOT Right 2021    Procedure: Almaz Coup, right tameka osteotomy and 2nd claw toe correction;  Surgeon: Karla Casanova MD;  Location: [facility];  Service: Orthopedics   • VT ERCP DX COLLECTION SPECIMEN BRUSHING/WASHING N/A 2018    Procedure: ENDOSCOPIC RETROGRADE CHOLANGIOPANCREATOGRAPHY (ERCP);   Surgeon: Issa Mcclure MD;  [facility];  Service: Gastroenterology   • VT LAPAROSCOPY PROCTOPEXY PROLAPSE N/A 2018    Procedure: ROBOTIC SIGMOID RESECTION / RECTOPEXY;  Surgeon: Farzana Aguilera MD;  [facility];  Service: Colorectal   • VT OPEN TREATMENT RADIAL SHAFT FRACTURE Right 10/11/2021    Procedure: OPEN REDUCTION W/ INTERNAL FIXATION (ORIF) RADIUS (WRIST), RIGHT DISTAL;  Surgeon: Tavia Joy MD;  Location: [facility];  Service: Orthopedics   • VT REMOVAL IMPLANT DEEP Right 2022    Procedure: Removal of hardware volar aspect right distal radius (distal radial plate and screws);   Surgeon: Jass Turner MD;  Location: [facility];  Service: Orthopedics   • MT SIGMOIDOSCOPY FLX DX W/COLLJ SPEC BR/WA IF PFRMD N/A 7/13/2018    Procedure: Jose Flannery;  Surgeon: Yolanda Gramajo MD;  [facility];  Service: Colorectal   • MT TR TDN RESTORE INTRNSC FUNCJ ALL 4 FNGRS Right 6/23/2022    Procedure: Right ring finger flexor digitorum superficialis to flexor pollicis longus tendon transfer;  Surgeon: Jass Turner MD;  Location: [facility];  Service: Orthopedics   • TUBAL LIGATION Bilateral 1997   • Donnie [address]34 THYROID BIOPSY  7/30/2019     Family History:  Family History   Problem Relation Age of Onset   • Bipolar disorder Mother    • Mental illness Mother         depression   • Stroke Mother    • Dementia Mother    • Colon polyps Mother    • Heart disease Father    • Hypertension Father    • Diabetes Father    • Other Family         Back disorder   • Diabetes Family    • Heart disease Family    • Hypertension Family    • Stroke Family    • Thyroid disease Family    • Breast cancer Paternal Grandmother         age unknown   • Breast cancer Paternal Aunt         age unknown   • Breast cancer Maternal Aunt         age unknown   • Mental illness Sister    • Colon polyps Sister    • Mental illness Sister    • Heart disease Sister    • No Known Problems Sister    • Breast cancer Sister 76   • Other Son         pituitary tumor   • Hypertension Son    • Obesity Son    • No Known Problems Son    • No Known Problems Maternal Grandmother    • No Known Problems Maternal Grandfather    • No Known Problems Paternal Grandfather    • Breast cancer Paternal Aunt         age unknown   • Substance Abuse Neg Hx         neg fam hx   • Colon cancer Neg Hx      Social History:  Social History     Substance and Sexual Activity   Alcohol Use Never     Social History     Substance and Sexual Activity   Drug Use Not Currently     Social History     Tobacco Use   Smoking Status Never   Smokeless Tobacco Never     Review of Systems   Constitutional: Negative  HENT: Negative  Eyes: Negative  Respiratory: Negative  Cardiovascular: Negative  Gastrointestinal: Negative  Endocrine: Negative  Genitourinary: Negative  Musculoskeletal: Positive for arthralgias (Bilateral knee), back pain, gait problem (Antalgic) and joint swelling (Bilateral knee)  Skin: Negative  Allergic/Immunologic: Negative  Hematological: Negative  Psychiatric/Behavioral: Negative  Objective:  BP Readings from Last 1 Encounters:   01/18/23 140/82      Wt Readings from Last 1 Encounters:   01/18/23 102 kg (224 lb)      BMI:   Estimated body mass index is 35 08 kg/m² as calculated from the following:    Height as of this encounter: 5' 7" (1 702 m)  Weight as of this encounter: 102 kg (224 lb)  BSA:   Estimated body surface area is 2 13 meters squared as calculated from the following:    Height as of this encounter: 5' 7" (1 702 m)  Weight as of this encounter: 102 kg (224 lb)  Physical Exam  Vitals and nursing note reviewed  Constitutional:       Appearance: Normal appearance  She is well-developed  HENT:      Head: Normocephalic and atraumatic  Right Ear: External ear normal       Left Ear: External ear normal    Eyes:      Extraocular Movements: Extraocular movements intact  Conjunctiva/sclera: Conjunctivae normal    Pulmonary:      Effort: Pulmonary effort is normal    Musculoskeletal:      Cervical back: Neck supple  Right knee: No effusion  Instability Tests: Medial Kateryna test negative and lateral Kateryna test negative  Left knee: No effusion  Instability Tests: Medial Kateryna test negative and lateral Kateryna test negative  Skin:     General: Skin is warm and dry  Neurological:      Mental Status: She is alert and oriented to person, place, and time  Deep Tendon Reflexes: Reflexes are normal and symmetric  Psychiatric:         Mood and Affect: Mood normal          Behavior: Behavior normal        Right Knee Exam     Tenderness   The patient is experiencing tenderness in the medial joint line and pes anserinus  Range of Motion   Extension: 0   Flexion: 120     Tests   Kateryna:  Medial - negative Lateral - negative  Varus: negative Valgus: negative  Patellar apprehension: negative    Other   Erythema: absent  Scars: absent  Sensation: normal  Pulse: present  Swelling: none  Effusion: no effusion present      Left Knee Exam     Tenderness   The patient is experiencing tenderness in the medial joint line and pes anserinus  Range of Motion   Extension: 0   Flexion: 120     Tests   Kateryna:  Medial - negative Lateral - negative  Varus: negative Valgus: negative  Patellar apprehension: negative    Other   Erythema: absent  Scars: absent  Sensation: normal  Pulse: present  Swelling: none  Effusion: no effusion present          Large joint arthrocentesis: bilateral anserine bursa  Universal Protocol:  Consent: Verbal consent obtained    Risks and benefits: risks, benefits and alternatives were discussed  Consent given by: patient  Patient understanding: patient states understanding of the procedure being performed  Site marked: the operative site was marked  Patient identity confirmed: verbally with patient    Supporting Documentation  Indications: pain   Procedure Details  Location: knee - bilateral anserine bursa  Preparation: Patient was prepped and draped in the usual sterile fashion  Needle size: 22 G  Ultrasound guidance: no  Approach: medial    Medications (Right): 10 mL ropivacaine 0 5 %; 12 mg betamethasone acetate-betamethasone sodium phosphate 6 (3-3) mg/mLMedications (Left): 10 mL ropivacaine 0 5 %; 12 mg betamethasone acetate-betamethasone sodium phosphate 6 (3-3) mg/mL   Patient tolerance: patient tolerated the procedure well with no immediate complications  Dressing:  Sterile dressing applied        No new images for review today     Scribe Attestation    I,:   am acting as a scribe while in the presence of the attending physician :       I,:   personally performed the services described in this documentation    as scribed in my presence :

## 2023-01-19 ENCOUNTER — OFFICE VISIT (OUTPATIENT)
Dept: PSYCHIATRY | Facility: CLINIC | Age: 63
End: 2023-01-19

## 2023-01-19 ENCOUNTER — TELEPHONE (OUTPATIENT)
Dept: PAIN MEDICINE | Facility: CLINIC | Age: 63
End: 2023-01-19

## 2023-01-19 DIAGNOSIS — F31.9 BIPOLAR AFFECTIVE DISORDER, REMISSION STATUS UNSPECIFIED (HCC): ICD-10-CM

## 2023-01-19 DIAGNOSIS — F11.20 CONTINUOUS OPIOID DEPENDENCE (HCC): ICD-10-CM

## 2023-01-19 DIAGNOSIS — F41.1 GENERALIZED ANXIETY DISORDER: Primary | ICD-10-CM

## 2023-01-19 RX ORDER — ARIPIPRAZOLE 5 MG/1
TABLET ORAL
Qty: 30 TABLET | Refills: 2 | Status: SHIPPED | OUTPATIENT
Start: 2023-01-19

## 2023-01-19 NOTE — TELEPHONE ENCOUNTER
Caller: Gigi Holt    Doctor: Ezra Fitch    Reason for call: patient called asking if the knee injections she had with Dr Glenn Kaur on 1/18 will interfere with her upcoming injections on 1/23 with Rhea Calvin    Call back#:[phone]

## 2023-01-23 ENCOUNTER — APPOINTMENT (OUTPATIENT)
Dept: RADIOLOGY | Facility: CLINIC | Age: 63
End: 2023-01-23

## 2023-01-23 ENCOUNTER — PROCEDURE VISIT (OUTPATIENT)
Dept: PAIN MEDICINE | Facility: CLINIC | Age: 63
End: 2023-01-23

## 2023-01-23 ENCOUNTER — OFFICE VISIT (OUTPATIENT)
Dept: OBGYN CLINIC | Facility: CLINIC | Age: 63
End: 2023-01-23

## 2023-01-23 VITALS
BODY MASS INDEX: 35.08 KG/M2 | SYSTOLIC BLOOD PRESSURE: 131 MMHG | HEIGHT: 67 IN | HEART RATE: 86 BPM | DIASTOLIC BLOOD PRESSURE: 84 MMHG

## 2023-01-23 VITALS
WEIGHT: 224 LBS | SYSTOLIC BLOOD PRESSURE: 130 MMHG | BODY MASS INDEX: 35.16 KG/M2 | HEIGHT: 67 IN | TEMPERATURE: 98.3 F | DIASTOLIC BLOOD PRESSURE: 80 MMHG

## 2023-01-23 DIAGNOSIS — S52.531D CLOSED COLLES' FRACTURE OF RIGHT RADIUS WITH ROUTINE HEALING, SUBSEQUENT ENCOUNTER: ICD-10-CM

## 2023-01-23 DIAGNOSIS — M79.18 MYOFASCIAL PAIN SYNDROME: ICD-10-CM

## 2023-01-23 DIAGNOSIS — R10.9 RIGHT FLANK PAIN: ICD-10-CM

## 2023-01-23 DIAGNOSIS — M54.50 CHRONIC BILATERAL LOW BACK PAIN, UNSPECIFIED WHETHER SCIATICA PRESENT: ICD-10-CM

## 2023-01-23 DIAGNOSIS — G89.4 CHRONIC PAIN SYNDROME: ICD-10-CM

## 2023-01-23 DIAGNOSIS — M79.644 FINGER PAIN, RIGHT: ICD-10-CM

## 2023-01-23 DIAGNOSIS — G89.29 CHRONIC BILATERAL LOW BACK PAIN, UNSPECIFIED WHETHER SCIATICA PRESENT: ICD-10-CM

## 2023-01-23 DIAGNOSIS — S52.531D CLOSED COLLES' FRACTURE OF RIGHT RADIUS WITH ROUTINE HEALING, SUBSEQUENT ENCOUNTER: Primary | ICD-10-CM

## 2023-01-23 RX ORDER — METHOCARBAMOL 500 MG/1
500 TABLET, FILM COATED ORAL 3 TIMES DAILY
Qty: 90 TABLET | Refills: 0 | Status: SHIPPED | OUTPATIENT
Start: 2023-01-23

## 2023-01-23 NOTE — PROGRESS NOTES
Universal Protocol:  Consent: Verbal consent obtained  Consent given by: patient  Patient understanding: patient states understanding of the procedure being performed  Patient consent: the patient's understanding of the procedure matches consent given  Procedure consent: procedure consent matches procedure scheduled  Site marked: the operative site was marked  Patient identity confirmed: verbally with patient    Supporting Documentation  Indications: pain   Trigger Point Injections: single/multiple trigger point(s): 1-2 muscle groups    Injection site identified by: palpation    Procedure Details  Location(s):    Lower Back: L lumbar paraspinals and R lumbar paraspinals   Needle size: 25 G  Patient tolerance: patient tolerated the procedure well with no immediate complications  Additional procedure details: 1% lidocaine 50mg/5ml NDC: [address]-3 Lot#: [id number] Expires 06/2025        The patient is a 63 y o  female last seen on 11/28/2022 who presents for a follow up office visit in regards to chronic pain secondary to low back pain, lumbar spondylosis, spondylolisthesis, lumbar disc disease, and lumbar foraminal stenosis  She is here today for trigger point injections  Since the last office visit she was seen in the ED on 1/17/23 for right flank pain  She was prescribed methocarbamol 500 mg 1 tab 3 times a day, which she states is helpful  Trigger Point Injection     Pre-operative diagnosis: myofascial pain    Post-operative diagnosis: myofascial pain    After risks and benefits were explained including bleeding, infection, worsening of the pain, damage to the area being injected, weakness, allergic reaction to medications, vascular injection, and nerve damage, signed consent was obtained  All questions were answered  The area of the trigger point was identified and the skin prepped three times with alcohol and the alcohol allowed to dry    Next, a 25 gauge 1 5 inch needle was placed in the area of the trigger point  Once reproduction of the pain was elicited and negative aspiration confirmed, the trigger point was injected and the needle removed  The patient did tolerate the procedure well and there were not complications  Medication used: 1% Lidocaine 50 mg/5 ml   4 ml used -1 ml per site, 1 ml discarded         Trigger points injected: 4      Trigger point(s) location(s):  bilateral lumbar paraspinal musculature  3 left, 1 right       Trigger point injections will be performed today  The injections were reviewed with the patient, and they were made aware of the risks  Consent form was signed  The pain may temporarily increase after the injections and the site may be ecchymotic  The patient was advised to use ice as needed for the pain  See separate procedure note  The patient was given a pain diary to rate the pain over the next 24 hours  They were instructed to return the diary to our office       I also sent a refill prescription for methocarbamol 500 mg 1 tab 3 times a day as needed for muscle spasms

## 2023-01-23 NOTE — PROGRESS NOTES
ASSESSMENT/PLAN:    Assessment:   S/P Removal of hardware volar aspect right distal radius (distal radial plate and screws) - Right, Right ring finger flexor digitorum superficialis to flexor pollicis longus tendon transfer - Right, and Application short-arm thumb spica splint - Right on 6/23/2022   s/p associated ulnar styloid fracture after fall on 08/10/2022    Right ring finger stiffness    Plan:   Patient was shown finger stretches to help with extension and flexion of her ring finger  There are no formal restrictions  Follow Up:  PRN      _____________________________________________________  CHIEF COMPLAINT:  Chief Complaint   Patient presents with   • Right Wrist - Follow-up     Removal of hardware volar aspect right distal radius (distal radial plate and screws)    Right ring finger flexor digitorum superficialis to flexor pollicis longus tendon transfer- 6/23/22             SUBJECTIVE:  Casey Colin is a 63 y o  female who presents for follow up regarding S/P Removal of hardware volar aspect right distal radius (distal radial plate and screws) - Right, Right ring finger flexor digitorum superficialis to flexor pollicis longus tendon transfer - Right, and Application short-arm thumb spica splint - Right on 6/23/2022   s/p associated ulnar styloid fracture after fall on 08/10/2022  Since last visit, Casey Colin has tried Resume activities as tolerated with only partial relief  Today there is Stiffness/LROM to the right ring finger      Radiation: None  Associated symptoms: No Complaints    PAST MEDICAL HISTORY:  Past Medical History:   Diagnosis Date   • Anxiety    • Anxiety disorder    • Arthritis    • At risk for falls    • Bipolar 2 disorder (HCC)    • Chronic back pain    • Chronic kidney disease    • Closed fracture of distal end of right fibula with routine healing 11/04/2020   • COVID-19     in Jan 2021   • CVA (cerebral vascular accident) [facility])     noted on MRI in the past   • Depression    • GERD (gastroesophageal reflux disease)    • Hypercholesteremia    • Hypernatremia    • Hypertension    • Hypokalemia    • Idiopathic chronic pancreatitis ([facility] [address] ) 2018   • Intervertebral disc disorder with radiculopathy of lumbosacral region     resolved: 2015   • Kidney disease    • Limb alert care status     LUE-fistula   • Panic attacks    • Pericardial effusion    • PONV (postoperative nausea and vomiting)    • Psychiatric problem    • Radiculitis     resolved: 2015   • [facility]    • Stroke [facility])    • Vitamin D deficiency        PAST SURGICAL HISTORY:  Past Surgical History:   Procedure Laterality Date   • BUNIONECTOMY      Left foot    • CAST APPLICATION Right     Procedure: Application short-arm thumb spica splint;  Surgeon: Brandon Longoria MD;  Location: [facility];  Service: Orthopedics   • COLON SURGERY     • COLONOSCOPY  2021   • DILATION AND CURETTAGE OF UTERUS     • INDUCED       surgically induced   • HI ARTERIOVENOUS ANASTOMOSIS OPEN DIRECT Left 2019    Procedure: CREATION FISTULA ARTERIOVENOUS (AV) left wrists possible left upper;  Surgeon: Montana Steel MD;  Location: [facility];  Service: Vascular   • [address]  W/SESMDC W/DIST Rachel Grills Left 2019    Procedure: Juan Antonio Randle;  Surgeon: Morena Germain DPM;  Location: [facility];  Service: Podiatry   • HI CORRJ [address] W/SESMDC W/DIST Rachel Grills Right 8/3/2020    Procedure: Radha Matamoros;  Surgeon: Morena Germain DPM;  Location: [facility];  Service: Podiatry   • HI CORRJ HALLUX VALGUS W/SESMDC W/PROX [address] OSTEOT Right 2021    Procedure: Kiet Poag, right tameka osteotomy and 2nd claw toe correction;  Surgeon: Yayo Portillo MD;  Location: [facility];  Service: Orthopedics   • HI ERCP DX COLLECTION SPECIMEN BRUSHING/WASHING N/A 2018    Procedure: ENDOSCOPIC RETROGRADE CHOLANGIOPANCREATOGRAPHY (ERCP); Surgeon: Kristal Coleman MD;  Location[facility];  Service: Gastroenterology   • SD LAPAROSCOPY PROCTOPEXY PROLAPSE N/A 7/13/2018    Procedure: ROBOTIC SIGMOID RESECTION / RECTOPEXY;  Surgeon: Trini Galvan MD;  Location: [facility];  Service: Colorectal   • SD OPEN TREATMENT RADIAL SHAFT FRACTURE Right 10/11/2021    Procedure: OPEN REDUCTION W/ INTERNAL FIXATION (ORIF) RADIUS (WRIST), RIGHT DISTAL;  Surgeon: Jenelle Solorzano MD;  Location: [facility];  Service: Orthopedics   • SD REMOVAL IMPLANT DEEP Right 6/23/2022    Procedure: Removal of hardware volar aspect right distal radius (distal radial plate and screws);   Surgeon: Laila Fierro MD;  Location: [facility];  Service: Orthopedics   • SD SIGMOIDOSCOPY FLX DX W/COLLJ SPEC BR/WA IF PFRMD N/A 7/13/2018    Procedure: Yeadon Stai;  Surgeon: Trini Galvan MD;  [facility];  Service: Colorectal   • SD TR TDN RESTORE INTRNSC FUNCJ ALL 4 FNGRS Right 6/23/2022    Procedure: Right ring finger flexor digitorum superficialis to flexor pollicis longus tendon transfer;  Surgeon: Laila Fierro MD;  Location: [facility];  Service: Orthopedics   • TUBAL LIGATION Bilateral 1997   • [address] THYROID BIOPSY  7/30/2019       FAMILY HISTORY:  Family History   Problem Relation Age of Onset   • Bipolar disorder Mother    • Mental illness Mother         depression   • Stroke Mother    • Dementia Mother    • Colon polyps Mother    • Heart disease Father    • Hypertension Father    • Diabetes Father    • Other Family         Back disorder   • Diabetes Family    • Heart disease Family    • Hypertension Family    • Stroke Family    • Thyroid disease Family    • Breast cancer Paternal Grandmother         age unknown   • Breast cancer Paternal [de-identified]         age unknown   • Breast cancer Maternal Aunt         age unknown   • Mental illness Sister    • Colon polyps Sister    • Mental illness Sister    • Heart disease Sister    • No Known Problems Sister • Breast cancer Sister 76   • Other Son         pituitary tumor   • Hypertension Son    • Obesity Son    • No Known Problems Son    • No Known Problems Maternal Grandmother    • No Known Problems Maternal Grandfather    • No Known Problems Paternal Grandfather    • Breast cancer Paternal Aunt         age unknown   • Substance Abuse Neg Hx         neg fam hx   • Colon cancer Neg Hx        SOCIAL HISTORY:  Social History     Tobacco Use   • Smoking status: Never   • Smokeless tobacco: Never   Vaping Use   • Vaping Use: Never used   Substance Use Topics   • Alcohol use: Never   • Drug use: Not Currently       MEDICATIONS:    Current Outpatient Medications:   •  acetaminophen (TYLENOL) 650 mg CR tablet, Take 1 tablet (650 mg total) by mouth every 8 (eight) hours as needed for mild pain, Disp: 30 tablet, Rfl: 0  •  AMILoride 5 mg tablet, Take 1 tablet (5 mg total) by mouth 2 (two) times a day, Disp: 180 tablet, Rfl: 3  •  amLODIPine (NORVASC) 5 mg tablet, Take 1 tablet (5 mg total) by mouth daily, Disp: 30 tablet, Rfl: 3  •  ARIPiprazole (ABILIFY) 5 mg tablet, Aripiprazole 5 mg : 1 tablet po daily, Disp: 30 tablet, Rfl: 2  •  aspirin 81 mg chewable tablet, Chew 1 tablet (81 mg total) daily, Disp: , Rfl:   •  carvedilol (COREG) 25 mg tablet, Take 1 tablet (25 mg total) by mouth 2 (two) times a day with meals, Disp: 180 tablet, Rfl: 3  •  cholecalciferol (VITAMIN D3) 1,000 units tablet, Take 5 tablets (5,000 Units total) by mouth daily, Disp: 90 tablet, Rfl: 5  •  clonazePAM (KlonoPIN) 0 5 mg tablet, Take 1 tablet (0 5 mg total) by mouth 3 (three) times a day Do not start before 2022 , Disp: 270 tablet, Rfl: 0  •  fluvoxaMINE (LUVOX) 100 mg tablet, Fluvoxamine 100m tablet in the morning ; 2 tablets at night, Disp: 270 tablet, Rfl: 0  •  lamoTRIgine (LaMICtal) 200 MG tablet, Lamotrigine 200m tablet po in the morning + 1 tablet po at night, Disp: 180 tablet, Rfl: 0  •  linaCLOtide (Linzess) 290 MCG CAPS, Take 1 capsule by mouth daily, Disp: 90 capsule, Rfl: 0  •  methocarbamol (ROBAXIN) 500 mg tablet, Take 1 tablet (500 mg total) by mouth 3 (three) times a day, Disp: 90 tablet, Rfl: 0  •  omeprazole (PriLOSEC) 40 MG capsule, Take 1 capsule (40 mg total) by mouth daily before breakfast, Disp: 90 capsule, Rfl: 3  •  ondansetron (ZOFRAN) 4 mg tablet, Take 1 tablet (4 mg total) by mouth every 8 (eight) hours as needed for nausea or vomiting, Disp: 60 tablet, Rfl: 1  •  QUEtiapine (SEROquel) 100 mg tablet, Quetiapine 100mg : 1 tablet + 400mg po at night, Disp: 90 tablet, Rfl: 0  •  QUEtiapine (SEROquel) 300 mg tablet, Quetiapine 300m tablet po  in morning, Disp: 90 tablet, Rfl: 0  •  QUEtiapine (SEROquel) 400 MG tablet, Quetiapine 400m tablet + 100mg tablet po at night, Disp: 90 tablet, Rfl: 0  •  rosuvastatin (CRESTOR) 20 MG tablet, Take 1 tablet (20 mg total) by mouth every evening, Disp: 90 tablet, Rfl: 3  •  traMADol (Ultram) 50 mg tablet, Take 1 tablet (50 mg total) by mouth every 8 (eight) hours as needed for severe pain, Disp: 60 tablet, Rfl: 0  •  albuterol (Ventolin HFA) 90 mcg/act inhaler, Inhale 2 puffs every 6 (six) hours as needed for wheezing or shortness of breath (Patient not taking: Reported on 2022), Disp: 8 5 g, Rfl: 3    ALLERGIES:  Allergies   Allergen Reactions   • Pollen Extract Nasal Congestion       REVIEW OF SYSTEMS:  Pertinent items are noted in HPI      LABS:  HgA1c:   Lab Results   Component Value Date    HGBA1C 5 4 2020     BMP:   Lab Results   Component Value Date    CALCIUM 9 3 2023     2017    K 4 9 2023    CO2 25 2023     2023    BUN 37 (H) 2023    CREATININE 2 35 (H) 2023           _____________________________________________________  PHYSICAL EXAMINATION:  Vital signs: /84   Pulse 86   Ht 5' 7" (1 702 m)   LMP  (LMP Unknown)   BMI 35 08 kg/m²   General: well developed and well nourished, alert, oriented times 3 and appears comfortable  Psychiatric: Normal  HEENT: Trachea Midline, No torticollis  Cardiovascular: No discernable arrhythmia  Pulmonary: No wheezing or stridor  Abdomen: No rebound or guarding  Extremities: No peripheral edema  Skin: No masses, erythema, lacerations, fluctation, ulcerations  Neurovascular: Sensation Intact to the Median, Ulnar, Radial Nerve, Motor Intact to the Median, Ulnar, Radial Nerve and Pulses Intact    MUSCULOSKELETAL EXAMINATION:  RIGHT SIDE:  full composite fist formation, can oppose, no ttp, ring finger PIP stiffness noted, NVI    _____________________________________________________  STUDIES REVIEWED:  Images were reviewed in PACS by Dr Yanelis Bernard and demonstrate: xray of right wrist on 1/23/2023 demonstrates healed across distal radius s/p hardware removal  Xray of right ring finger on 1/23/2023 demonstrates no acute fractures or dislocations         PROCEDURES PERFORMED:  Procedures  No Procedures performed today   Scribe Attestation    I,:  Charity Lares am acting as a scribe while in the presence of the attending physician :       I,:  Anish Muir MD personally performed the services described in this documentation    as scribed in my presence :

## 2023-01-24 ENCOUNTER — APPOINTMENT (EMERGENCY)
Dept: RADIOLOGY | Facility: HOSPITAL | Age: 63
End: 2023-01-24

## 2023-01-24 ENCOUNTER — HOSPITAL ENCOUNTER (EMERGENCY)
Facility: HOSPITAL | Age: 63
Discharge: HOME | End: 2023-01-24
Attending: EMERGENCY MEDICINE

## 2023-01-24 VITALS
TEMPERATURE: 98.7 F | SYSTOLIC BLOOD PRESSURE: 121 MMHG | OXYGEN SATURATION: 94 % | HEART RATE: 77 BPM | RESPIRATION RATE: 20 BRPM | DIASTOLIC BLOOD PRESSURE: 67 MMHG

## 2023-01-24 DIAGNOSIS — S83.91XA RIGHT KNEE SPRAIN: Primary | ICD-10-CM

## 2023-01-24 DIAGNOSIS — S63.90XA HAND SPRAIN: ICD-10-CM

## 2023-01-24 DIAGNOSIS — S43.409A SHOULDER SPRAIN: ICD-10-CM

## 2023-01-24 RX ORDER — TRAMADOL HYDROCHLORIDE 50 MG/1
50 TABLET ORAL ONCE
Status: COMPLETED | OUTPATIENT
Start: 2023-01-24 | End: 2023-01-24

## 2023-01-24 RX ADMIN — TRAMADOL HYDROCHLORIDE 50 MG: 50 TABLET, COATED ORAL at 13:38

## 2023-01-24 NOTE — ED PROVIDER NOTES
History  Chief Complaint   Patient presents with   • Fall     Patient states"she fell today at home while bringing the dog in from outside"  Patient landed on the rec room floor  Patient c/o right knee pain and right hand pain  No head strike  No loc  Patient taking 81mg of aspirin  This is a 63-year-old female who states she tripped and fell while bringing her dog into the house at 10:00 this morning complains of right shoulder right hand and right knee pain did not hit her head no loss of consciousness and no blood thinners  History provided by:  Patient  Medical Problem  Location:  Right shoulder hand and knee  Quality:  Achy pain  Severity:  Moderate  Onset quality:  Sudden  Duration:  3 hours  Timing:  Constant  Progression:  Unchanged  Chronicity:  New  Context:  Trip and fall this morning with right shoulder and and knee pain      Prior to Admission Medications   Prescriptions Last Dose Informant Patient Reported? Taking?    AMILoride 5 mg tablet   No No   Sig: Take 1 tablet (5 mg total) by mouth 2 (two) times a day   ARIPiprazole (ABILIFY) 5 mg tablet   No No   Sig: Aripiprazole 5 mg : 1 tablet po daily   QUEtiapine (SEROquel) 100 mg tablet   No No   Sig: Quetiapine 100mg : 1 tablet + 400mg po at night   QUEtiapine (SEROquel) 300 mg tablet   No No   Sig: Quetiapine 300m tablet po  in morning   QUEtiapine (SEROquel) 400 MG tablet   No No   Sig: Quetiapine 400m tablet + 100mg tablet po at night   acetaminophen (TYLENOL) 650 mg CR tablet   No No   Sig: Take 1 tablet (650 mg total) by mouth every 8 (eight) hours as needed for mild pain   albuterol (Ventolin HFA) 90 mcg/act inhaler   No No   Sig: Inhale 2 puffs every 6 (six) hours as needed for wheezing or shortness of breath   Patient not taking: Reported on 2022   amLODIPine (NORVASC) 5 mg tablet   No No   Sig: Take 1 tablet (5 mg total) by mouth daily   aspirin 81 mg chewable tablet   No No   Sig: Chew 1 tablet (81 mg total) daily carvedilol (COREG) 25 mg tablet   No No   Sig: Take 1 tablet (25 mg total) by mouth 2 (two) times a day with meals   cholecalciferol (VITAMIN D3) 1,000 units tablet   No No   Sig: Take 5 tablets (5,000 Units total) by mouth daily   clonazePAM (KlonoPIN) 0 5 mg tablet   No No   Sig: Take 1 tablet (0 5 mg total) by mouth 3 (three) times a day Do not start before 2022     fluvoxaMINE (LUVOX) 100 mg tablet   No No   Sig: Fluvoxamine 100m tablet in the morning ; 2 tablets at night   lamoTRIgine (LaMICtal) 200 MG tablet   No No   Sig: Lamotrigine 200m tablet po in the morning + 1 tablet po at night   linaCLOtide (Linzess) 290 MCG CAPS   No No   Sig: Take 1 capsule by mouth daily   methocarbamol (ROBAXIN) 500 mg tablet   No No   Sig: Take 1 tablet (500 mg total) by mouth 3 (three) times a day   omeprazole (PriLOSEC) 40 MG capsule   No No   Sig: Take 1 capsule (40 mg total) by mouth daily before breakfast   ondansetron (ZOFRAN) 4 mg tablet   No No   Sig: Take 1 tablet (4 mg total) by mouth every 8 (eight) hours as needed for nausea or vomiting   rosuvastatin (CRESTOR) 20 MG tablet   No No   Sig: Take 1 tablet (20 mg total) by mouth every evening   traMADol (Ultram) 50 mg tablet   No No   Sig: Take 1 tablet (50 mg total) by mouth every 8 (eight) hours as needed for severe pain      Facility-Administered Medications: None       Past Medical History:   Diagnosis Date   • Anxiety    • Anxiety disorder    • Arthritis    • At risk for falls    • Bipolar 2 disorder (HCC)    • Chronic back pain    • Chronic kidney disease    • Closed fracture of distal end of right fibula with routine healing 2020   • COVID-19     in 2021   • CVA (cerebral vascular accident) [facility])     noted on MRI in the past   • Depression    • GERD (gastroesophageal reflux disease)    • Hypercholesteremia    • Hypernatremia    • Hypertension    • Hypokalemia    • Idiopathic chronic pancreatitis ([facility] [address] ) 2018   • Intervertebral disc disorder with radiculopathy of lumbosacral region     resolved: 2015   • Kidney disease    • Limb alert care status     LUE-fistula   • Panic attacks    • Pericardial effusion    • PONV (postoperative nausea and vomiting)    • Psychiatric problem    • Radiculitis     resolved: 2015   • Secondary renal hyperparathyroidism ([facility] [address] )    • Stroke [facility])    • Vitamin D deficiency        Past Surgical History:   Procedure Laterality Date   • BUNIONECTOMY      Left foot    • CAST APPLICATION Right 9635    Procedure: Application short-arm thumb spica splint;  Surgeon: Adin Gates MD;  Location: [facility];  Service: Orthopedics   • COLON SURGERY     • COLONOSCOPY  2021   • DILATION AND CURETTAGE OF UTERUS     • INDUCED       surgically induced   • SC ARTERIOVENOUS ANASTOMOSIS OPEN DIRECT Left 2019    Procedure: CREATION FISTULA ARTERIOVENOUS (AV) left wrists possible left upper;  Surgeon: Patito Fish MD;  Location: [facility];  Service: Vascular   • SC CORRJ [address] W/SESMDC W/DIST Shanda Lombard Left 2019    Procedure: Maureen Serum;  Surgeon: Osei Lynn DPM;  Location: [facility];  Service: Podiatry   • SC CORRJ [address] W/SESMDC W/DIST Shanda Lombard Right 8/3/2020    Procedure: Pedro Crockerer;  Surgeon: Osei Lynn DPM;  Location: [facility];  Service: Podiatry   • SC CORRJ HALLUX VALGUS W/SESMDC W/PROX PHLNX OSTEOT Right 2021    Procedure: Emily Jose Miguel, right tameka osteotomy and 2nd claw toe correction;  Surgeon: Gideon Benavides MD;  [facility]: [facility];  Service: Orthopedics   • SC ERCP DX COLLECTION SPECIMEN BRUSHING/WASHING N/A 2018    Procedure: ENDOSCOPIC RETROGRADE CHOLANGIOPANCREATOGRAPHY (ERCP);   Surgeon: Michaela Van MD;  [facility];  Service: Gastroenterology   • SC LAPAROSCOPY PROCTOPEXY PROLAPSE N/A 2018    Procedure: ROBOTIC SIGMOID RESECTION / RECTOPEXY;  Surgeon: Javy Paz MD;  [facility]: [facility];  Service: Colorectal   • TN OPEN TREATMENT RADIAL SHAFT FRACTURE Right 10/11/2021    Procedure: OPEN REDUCTION W/ INTERNAL FIXATION (ORIF) RADIUS (WRIST), RIGHT DISTAL;  Surgeon: Keith Willard MD;  Location: [facility];  Service: Orthopedics   • TN REMOVAL IMPLANT DEEP Right 6/23/2022    Procedure: Removal of hardware volar aspect right distal radius (distal radial plate and screws);   Surgeon: Cheryle Good MD;  [facility]: [facility];  Service: Orthopedics   • TN SIGMOIDOSCOPY FLX DX W/COLLJ SPEC BR/WA IF PFRMD N/A 7/13/2018    Procedure: Contreras Henriquez;  Surgeon: Fabian Salinas MD;  [facility];  Service: Colorectal   • TN TR TDN RESTORE INTRNSC FUNCJ ALL 4 FNGRS Right 6/23/2022    Procedure: Right ring finger flexor digitorum superficialis to flexor pollicis longus tendon transfer;  Surgeon: Cheryle Good MD;  Location: [facility];  Service: Orthopedics   • TUBAL LIGATION Bilateral 1997   • [address] THYROID BIOPSY  7/30/2019       Family History   Problem Relation Age of Onset   • Bipolar disorder Mother    • Mental illness Mother         depression   • Stroke Mother    • Dementia Mother    • Colon polyps Mother    • Heart disease Father    • Hypertension Father    • Diabetes Father    • Other Family         Back disorder   • Diabetes Family    • Heart disease Family    • Hypertension Family    • Stroke Family    • Thyroid disease Family    • Breast cancer Paternal Grandmother         age unknown   • Breast cancer Paternal Aunt         age unknown   • Breast cancer Maternal Aunt         age unknown   • Mental illness Sister    • Colon polyps Sister    • Mental illness Sister    • Heart disease Sister    • No Known Problems Sister    • Breast cancer Sister 76   • Other Son         pituitary tumor   • Hypertension Son    • Obesity Son    • No Known Problems Son    • No Known Problems Maternal Grandmother    • No Known Problems Maternal Grandfather    • No Known Problems Paternal Grandfather    • Breast cancer Paternal Aunt         age unknown   • Substance Abuse Neg Hx         neg fam hx   • Colon cancer Neg Hx      I have reviewed and agree with the history as documented  E-Cigarette/Vaping   • E-Cigarette Use Never User      E-Cigarette/Vaping Substances   • Nicotine No    • THC No    • CBD No    • Flavoring No    • Other No    • Unknown No      Social History     Tobacco Use   • Smoking status: Never   • Smokeless tobacco: Never   Vaping Use   • Vaping Use: Never used   Substance Use Topics   • Alcohol use: Never   • Drug use: Not Currently       Review of Systems   Musculoskeletal:        Right shoulder hand and knee pain  Chronic back pain   All other systems reviewed and are negative  Physical Exam  Physical Exam  Vitals and nursing note reviewed  Constitutional:       General: She is not in acute distress  HENT:      Head: Normocephalic and atraumatic  Right Ear: Tympanic membrane, ear canal and external ear normal       Left Ear: Tympanic membrane, ear canal and external ear normal    Eyes:      General:         Right eye: No discharge  Left eye: No discharge  Pupils: Pupils are equal, round, and reactive to light  Cardiovascular:      Rate and Rhythm: Normal rate and regular rhythm  Pulses: Normal pulses  Heart sounds: No murmur heard  No friction rub  No gallop  Pulmonary:      Effort: Pulmonary effort is normal  No respiratory distress  Breath sounds: No stridor  No wheezing, rhonchi or rales  Abdominal:      General: There is no distension  Palpations: Abdomen is soft  Tenderness: There is no abdominal tenderness  There is no guarding or rebound  Musculoskeletal:         General: Tenderness present  No deformity  Cervical back: Normal range of motion and neck supple  No rigidity or tenderness        Comments: Right shoulder right hand and right knee tenderness to palpation without any gross deformity or neurovascular deficit   Skin:     General: Skin is warm and dry  Findings: Bruising present  Neurological:      Mental Status: She is alert and oriented to person, place, and time  Cranial Nerves: No cranial nerve deficit  Psychiatric:         Mood and Affect: Mood normal          Vital Signs  ED Triage Vitals [01/24/23 1220]   Temperature Pulse Respirations Blood Pressure SpO2   98 7 °F (37 1 °C) 77 20 121/67 94 %      Temp src Heart Rate Source Patient Position - Orthostatic VS BP Location FiO2 (%)   -- -- -- -- --      Pain Score       5           Vitals:    01/24/23 1220   BP: 121/67   Pulse: 77         Visual Acuity  Visual Acuity    Flowsheet Row Most Recent Value   L Pupil Size (mm) 3   R Pupil Size (mm) 3          ED Medications  Medications   traMADol (ULTRAM) tablet 50 mg (has no administration in time range)       Diagnostic Studies  Results Reviewed     None                 XR hand 3+ views RIGHT   ED Interpretation by Tahira Pack DO (01/24 1319)   No acute fracture or dislocation      XR shoulder 2+ views RIGHT   ED Interpretation by Tahira Pack DO (01/24 1319)   No acute fracture or dislocation      XR knee 4+ views Right injury   ED Interpretation by Tahira Pack DO (01/24 1320)   No acute fracture or dislocation                 Procedures  Procedures         ED Course                               SBIRT 20yo+    Flowsheet Row Most Recent Value   SBIRT (25 yo +)    In order to provide better care to our patients, we are screening all of our patients for alcohol and drug use  Would it be okay to ask you these screening questions? Yes Filed at: 01/24/2023 1231   Initial Alcohol Screen: US AUDIT-C     1  How often do you have a drink containing alcohol? 0 Filed at: 01/24/2023 1231   2  How many drinks containing alcohol do you have on a typical day you are drinking? 0 Filed at: 01/24/2023 1231   3a  Male UNDER 65:  How often do you have five or more drinks on one occasion? 0 Filed at: 01/24/2023 1231   3b  FEMALE Any Age, or MALE 65+: How often do you have 4 or more drinks on one occassion? 0 Filed at: 01/24/2023 1231   Audit-C Score 0 Filed at: 01/24/2023 1231   ZAYRA: How many times in the past year have you    Used an illegal drug or used a prescription medication for non-medical reasons? Never Filed at: 01/24/2023 1231                    Medical Decision Making  Trip and fall with musculoskeletal pain we will check x-rays of the shoulder and and knee areas no other concerning traumatic injuries at this time    Amount and/or Complexity of Data Reviewed  Radiology: ordered  Risk  Prescription drug management  Disposition  Final diagnoses:   Right knee sprain   Hand sprain - Right   Shoulder sprain - Right side     Time reflects when diagnosis was documented in both MDM as applicable and the Disposition within this note     Time User Action Codes Description Comment    1/24/2023 12:51 PM Bailey Leak Right knee sprain     1/24/2023 12:52 PM Thania [place]ne Add [Z12 50JS] Hand sprain     1/24/2023 12:52 PM Thania [place] Modify [S63 90XA] Hand sprain Right    1/24/2023 12:52 PM Thania [place] Add [H99 306L] Shoulder sprain     1/24/2023 12:52 PM Thania [place] Modify [Z41 810R] Shoulder sprain Right side      ED Disposition     ED Disposition   Discharge    Condition   Stable    Date/Time   Tue Jan 24, 2023  1:20 PM    Comment   Madyson Mejia discharge to home/self care  Follow-up Information     Follow up With Specialties Details Why Colleen Mabry 104, DO Internal Medicine   [place]  [address]  [phone]            Patient's Medications   Discharge Prescriptions    No medications on file       No discharge procedures on file      PDMP Review       Value Time User    PDMP Reviewed  Yes 1/10/2023  7:01 PM Jeanne Mcclendon          ED Provider  Electronically Signed by           Salma Zimmer Juan Miguel Anthony,   01/24/23 1323

## 2023-01-27 ENCOUNTER — OFFICE VISIT (OUTPATIENT)
Dept: FAMILY MEDICINE CLINIC | Facility: HOSPITAL | Age: 63
End: 2023-01-27

## 2023-01-27 VITALS
OXYGEN SATURATION: 98 % | WEIGHT: 224.6 LBS | HEIGHT: 67 IN | BODY MASS INDEX: 35.25 KG/M2 | SYSTOLIC BLOOD PRESSURE: 130 MMHG | HEART RATE: 89 BPM | DIASTOLIC BLOOD PRESSURE: 70 MMHG

## 2023-01-27 DIAGNOSIS — M79.604 BILATERAL LEG PAIN: Primary | ICD-10-CM

## 2023-01-27 DIAGNOSIS — N18.4 CKD (CHRONIC KIDNEY DISEASE) STAGE 4, GFR 15-29 ML/MIN (HCC): ICD-10-CM

## 2023-01-27 DIAGNOSIS — M79.605 BILATERAL LEG PAIN: Primary | ICD-10-CM

## 2023-01-27 DIAGNOSIS — I77.0 AVF (ARTERIOVENOUS FISTULA) (HCC): ICD-10-CM

## 2023-01-27 DIAGNOSIS — E66.01 OBESITY, MORBID (HCC): ICD-10-CM

## 2023-01-27 DIAGNOSIS — R19.5 ABNORMAL FINDINGS IN STOOL: ICD-10-CM

## 2023-01-27 DIAGNOSIS — M46.1 BILATERAL SACROILIITIS (HCC): ICD-10-CM

## 2023-01-27 DIAGNOSIS — K86.1 IDIOPATHIC CHRONIC PANCREATITIS (HCC): ICD-10-CM

## 2023-01-27 DIAGNOSIS — S40.022D CONTUSION OF LEFT ARM, SUBSEQUENT ENCOUNTER: ICD-10-CM

## 2023-01-27 DIAGNOSIS — N25.81 SECONDARY HYPERPARATHYROIDISM OF RENAL ORIGIN (HCC): ICD-10-CM

## 2023-01-27 NOTE — ASSESSMENT & PLAN NOTE
Lab Results   Component Value Date    EGFR 21 01/17/2023    EGFR 18 12/01/2022    EGFR 19 10/05/2022    CREATININE 2 35 (H) 01/17/2023    CREATININE 2 65 (H) 12/01/2022    CREATININE 2 61 (H) 10/05/2022   Is following with nephrology- is trying to get aide from [place]- also applying for medical assistance

## 2023-01-27 NOTE — PROGRESS NOTES
Assessment/Plan:     Diagnosis ICD-10-CM Associated Orders   1  Bilateral leg pain  M79 604     M79 605       2  Contusion of left arm, subsequent encounter  S40 022D       3  Abnormal findings in stool  R19 5 Ambulatory referral to Gastroenterology      4  Bilateral sacroiliitis (HCC)  M46 1       5  CKD (chronic kidney disease) stage 4, GFR 15-29 ml/min ([facility])  N18 4       6  AVF (arteriovenous fistula) ([facility])  I77 0       7  Idiopathic chronic pancreatitis (HCC)  K86 1       8  Obesity, morbid ([address] )  E66 01       9  Secondary hyperparathyroidism of renal origin ([address] )  N25 81           Problem List Items Addressed This Visit        Digestive    Idiopathic chronic pancreatitis ([address] )     resolved            Cardiovascular and Mediastinum    AVF (arteriovenous fistula) ([address] )     Has left arm thrill with fistula in place            Musculoskeletal and Integument    Bilateral sacroiliitis (HCC)     Had injections with dr Lesley San            Genitourinary    CKD (chronic kidney disease) stage 4, GFR 15-29 ml/min ([facility])       Other    Obesity, morbid ([address] )     Encouraged in heathy eating        Other Visit Diagnoses     Bilateral leg pain    -  Primary    Contusion of left arm, subsequent encounter        Abnormal findings in stool        Relevant Orders    Ambulatory referral to Gastroenterology    Secondary hyperparathyroidism of renal origin ([address] )                Return in about 3 months (around 4/27/2023)  Subjective:    Patient ID: Madyson Mejia is a 63 y o  female    1 rigth sided abdominal pain- had been in ed- is on colace for constipation - ct done shows  Ghosts of 20 plus pills and  Capsules noted in cecum- will have her hold calcium supplements-and zinc   will continue colace- will r efer to gi- concerned about dysmolity= has seen Dr Mauri Schmitz about a year ago  2  BMI Counseling: Body mass index is 35 18 kg/m²   The BMI is above normal  Nutrition recommendations include 3-5 servings of fruits/vegetables daily and consuming healthier snacks  3  ckd stage 4- was stage 5- is working with [facility] transplant team for possible kidney transplant      The following portions of the patient's history were reviewed and updated as appropriate: allergies, current medications and problem list      Review of Systems   Respiratory: Negative for shortness of breath  Gastrointestinal: Positive for abdominal pain  Musculoskeletal: Positive for back pain  Had injection in back with Dr Elkin Haque   also had both knees injected by Dr Liz Lees with continued pain   All other systems reviewed and are negative          Objective:      Current Outpatient Medications:   •  acetaminophen (TYLENOL) 650 mg CR tablet, Take 1 tablet (650 mg total) by mouth every 8 (eight) hours as needed for mild pain, Disp: 30 tablet, Rfl: 0  •  AMILoride 5 mg tablet, Take 1 tablet (5 mg total) by mouth 2 (two) times a day, Disp: 180 tablet, Rfl: 3  •  amLODIPine (NORVASC) 5 mg tablet, Take 1 tablet (5 mg total) by mouth daily, Disp: 30 tablet, Rfl: 3  •  ARIPiprazole (ABILIFY) 5 mg tablet, Aripiprazole 5 mg : 1 tablet po daily, Disp: 30 tablet, Rfl: 2  •  aspirin 81 mg chewable tablet, Chew 1 tablet (81 mg total) daily, Disp: , Rfl:   •  carvedilol (COREG) 25 mg tablet, Take 1 tablet (25 mg total) by mouth 2 (two) times a day with meals, Disp: 180 tablet, Rfl: 3  •  cholecalciferol (VITAMIN D3) 1,000 units tablet, Take 5 tablets (5,000 Units total) by mouth daily, Disp: 90 tablet, Rfl: 5  •  clonazePAM (KlonoPIN) 0 5 mg tablet, Take 1 tablet (0 5 mg total) by mouth 3 (three) times a day Do not start before 2022 , Disp: 270 tablet, Rfl: 0  •  fluvoxaMINE (LUVOX) 100 mg tablet, Fluvoxamine 100m tablet in the morning ; 2 tablets at night, Disp: 270 tablet, Rfl: 0  •  lamoTRIgine (LaMICtal) 200 MG tablet, Lamotrigine 200m tablet po in the morning + 1 tablet po at night, Disp: 180 tablet, Rfl: 0  •  methocarbamol (ROBAXIN) 500 mg tablet, Take 1 tablet (500 mg total) by mouth 3 (three) times a day, Disp: 90 tablet, Rfl: 0  •  omeprazole (PriLOSEC) 40 MG capsule, Take 1 capsule (40 mg total) by mouth daily before breakfast, Disp: 90 capsule, Rfl: 3  •  ondansetron (ZOFRAN) 4 mg tablet, Take 1 tablet (4 mg total) by mouth every 8 (eight) hours as needed for nausea or vomiting, Disp: 60 tablet, Rfl: 1  •  QUEtiapine (SEROquel) 100 mg tablet, Quetiapine 100mg : 1 tablet + 400mg po at night, Disp: 90 tablet, Rfl: 0  •  QUEtiapine (SEROquel) 300 mg tablet, Quetiapine 300m tablet po  in morning, Disp: 90 tablet, Rfl: 0  •  QUEtiapine (SEROquel) 400 MG tablet, Quetiapine 400m tablet + 100mg tablet po at night, Disp: 90 tablet, Rfl: 0  •  rosuvastatin (CRESTOR) 20 MG tablet, Take 1 tablet (20 mg total) by mouth every evening, Disp: 90 tablet, Rfl: 3  •  traMADol (Ultram) 50 mg tablet, Take 1 tablet (50 mg total) by mouth every 8 (eight) hours as needed for severe pain, Disp: 60 tablet, Rfl: 0  •  albuterol (Ventolin HFA) 90 mcg/act inhaler, Inhale 2 puffs every 6 (six) hours as needed for wheezing or shortness of breath (Patient not taking: Reported on 2022), Disp: 8 5 g, Rfl: 3  •  linaCLOtide (Linzess) 290 MCG CAPS, Take 1 capsule by mouth daily (Patient not taking: Reported on 2023), Disp: 90 capsule, Rfl: 0    Blood pressure 130/70, pulse 89, height 5' 7" (1 702 m), weight 102 kg (224 lb 9 6 oz), SpO2 98 %, not currently breastfeeding  Physical Exam  Vitals and nursing note reviewed  Constitutional:       General: She is not in acute distress  HENT:      Head: Normocephalic  Right Ear: There is no impacted cerumen  Eyes:      General:         Right eye: No discharge  Left eye: No discharge  Cardiovascular:      Rate and Rhythm: Normal rate and regular rhythm  Heart sounds: No murmur heard  Pulmonary:      Effort: No respiratory distress  Breath sounds: No rhonchi     Abdominal:      General: There is no distension  Tenderness: There is abdominal tenderness  Comments: Right flank tenderness   Musculoskeletal:         General: No tenderness  Neurological:      Mental Status: She is alert     Psychiatric:         Mood and Affect: Mood normal

## 2023-01-30 ENCOUNTER — TELEPHONE (OUTPATIENT)
Dept: GASTROENTEROLOGY | Facility: CLINIC | Age: 63
End: 2023-01-30

## 2023-01-30 ENCOUNTER — SOCIAL WORK (OUTPATIENT)
Dept: BEHAVIORAL/MENTAL HEALTH CLINIC | Facility: CLINIC | Age: 63
End: 2023-01-30

## 2023-01-30 DIAGNOSIS — F42.2 MIXED OBSESSIONAL THOUGHTS AND ACTS: ICD-10-CM

## 2023-01-30 DIAGNOSIS — F31.4 SEVERE DEPRESSED BIPOLAR I DISORDER WITHOUT PSYCHOTIC FEATURES (HCC): Primary | ICD-10-CM

## 2023-01-30 NOTE — TELEPHONE ENCOUNTER
I spoke with patient, she is agreeable to see any provider and she will be placed on waitlist and is willing to see any provider

## 2023-01-30 NOTE — TELEPHONE ENCOUNTER
Patients GI provider:  Dr Toney Pallas    Number to return call: [phone]    Reason for call: Pt calling stating she currently has a bowel blockage according to her PCP, there are capsules in her blockage      Scheduled procedure/appointment date if applicable: N/A

## 2023-01-30 NOTE — PSYCH
Behavioral Health Psychotherapy Progress Note    Psychotherapy Provided: Individual Psychotherapy     1  Severe depressed bipolar I disorder without psychotic features ([address] )        2  Mixed obsessional thoughts and acts            Goals addressed in session: Goal 1     DATA: Talat Thrasher said she is struggling a great deal with her medical issues  She said "I just want to get back to my old self "  She talked about her sons and how Kal still doesn't talk to her after 3 years  She said "I miss him "  Talat Thrasher also talked about her son Omar Felix and how he "seems to only want something to do with me when he wants money "  She mentioned today that it's still very difficult for her to live with her sister and her niece but feels she has little options and needs to stay with them but "wishes they would understand me more and my depression "  During this session, this clinician used the following therapeutic modalities: Solution-Focused Therapy    Substance Abuse was not addressed during this session  If the client is diagnosed with a co-occurring substance use disorder, please indicate any changes in the frequency or amount of use: N/A  Stage of change for addressing substance use diagnoses: No substance use/Not applicable    ASSESSMENT:  Kanu Hernandez presents with a Euthymic/ normal mood  her affect is Normal range and intensity, which is congruent, with her mood and the content of the session  The client has not made progress on their goals  Kanu Hernandez presents with a none risk of suicide, none risk of self-harm, and none risk of harm to others  For any risk assessment that surpasses a "low" rating, a safety plan must be developed  A safety plan was indicated: no  If yes, describe in detail     PLAN: Between sessions, Kanu Hernandez will communicate to her sister and niece that sometimes she feels quite but doesn't mean to upset them with not talking and sharing some days   At the next session, the therapist will use Supportive Psychotherapy to address Ayleen's issues and concerns  Also, will do treatment goals and crisis plan  Behavioral Health Treatment Plan and Discharge Planning: Nidhi Milindchemo is aware of and agrees to continue to work on their treatment plan  They have identified and are working toward their discharge goals   yes    Visit start and stop times:    01/30/23  Start Time: 0800  Stop Time: 0845  Total Visit Time: 45 minutes

## 2023-02-01 DIAGNOSIS — F31.9 BIPOLAR AFFECTIVE DISORDER, REMISSION STATUS UNSPECIFIED (HCC): ICD-10-CM

## 2023-02-01 DIAGNOSIS — I10 HTN (HYPERTENSION): ICD-10-CM

## 2023-02-01 DIAGNOSIS — F41.1 GENERALIZED ANXIETY DISORDER: ICD-10-CM

## 2023-02-01 RX ORDER — AMILORIDE HYDROCHLORIDE 5 MG/1
5 TABLET ORAL 2 TIMES DAILY
Qty: 180 TABLET | Refills: 3 | Status: CANCELLED | OUTPATIENT
Start: 2023-02-01

## 2023-02-01 RX ORDER — AMILORIDE HYDROCHLORIDE 5 MG/1
5 TABLET ORAL 2 TIMES DAILY
Qty: 180 TABLET | Refills: 3 | Status: SHIPPED | OUTPATIENT
Start: 2023-02-01

## 2023-02-01 NOTE — TELEPHONE ENCOUNTER
Patient called to let Dr Judith Strange know she has bowel blockage and they will update her on the plan stated she has an appointment for 02/23/23

## 2023-02-03 NOTE — PSYCH
[facility]/Hospital: [address] [facility]   [facility]    Psychiatric Progress Note  MRN#: [id number]  Bee Parikh 63 y o  female    This note was not shared with the patient due to reasonable likelihood of causing patient harm     __________________________________________________________________________________________________________________________________  OFFICE APPOINTMENT   Patient was seen today at [place] [address] location                                                Patient Bee Parikh ,1960   Prescriber/ Physician: Gail Ramirez DO  Physician Location:   [address]   • Patient  is currently located in the [place], where I am  licensed  ___________________________________________________________________________________________________________________________________       Subjective:Ayleen - endorsed mood as depression cause of situational stressors  Thinks her sister and niece who she lives with is talking negatively about her  Described associated frustration, lack of sleep ( 3-6 in the morning ) , appetite and weight increase and caution about it   Described crying all the time  And that problems with the sister has occurred for yrs  Denied hallucination, HI , or SI  Then stated " hearing her mom's voice who's - telling Ana Corona to go with her  Early reported missing her parents  And slight anxiety now      had slurred speech  Denied illicit drug abuse  - H/o pot, cocaine, etc about 20 yrs ago   Stated Slurred speech is cause she wears dentures ( upper and lower), Ana Corona also reported losing her teeth after taken  lithium     Medications- Ana Corona is compliant to Aripiprazole 5mg , w/o side effects - more calm since starting       Mental Status Evaluation:  General Appearance:  Bee Parikh is a 63 y o   female casually dressed, adequate hygiene and grooming, looks stated age, Wearing glasses    Behavior:  pleasant, cooperative, intermittent eye contact, mildly anxious, restless and fidgety   Speech:  WNL, rhythm, volume, latency, amount   Mood:  Depressed    Affect:  sad affect, slightly tearful   Thought Process:  normal and logical   Thought Content:  persecutory delusions   Perceptual Disturbances: Does not appear responding or preoccupied   Delusions  Yes   Risk Potential: Suicidal Ideations No  Homicidal Ideations No  Potential for Aggression No     Sensorium:  Oriented to person, place, time/date and situation   Memory:  recent and remote memory grossly intact   Consciousness:  alert and awake   Attention: attention span and concentration are age appropriate   Insight:  fair   Judgment: fair   Gait/Station: normal   Motor Activity: no abnormal movements     There were no vitals filed for this visit       Medications:   Current Outpatient Medications on File Prior to Visit   Medication Sig Dispense Refill   • AMILoride 5 mg tablet Take 1 tablet (5 mg total) by mouth 2 (two) times a day 180 tablet 3   • acetaminophen (TYLENOL) 650 mg CR tablet Take 1 tablet (650 mg total) by mouth every 8 (eight) hours as needed for mild pain 30 tablet 0   • albuterol (Ventolin HFA) 90 mcg/act inhaler Inhale 2 puffs every 6 (six) hours as needed for wheezing or shortness of breath (Patient not taking: Reported on 12/29/2022) 8 5 g 3   • amLODIPine (NORVASC) 5 mg tablet Take 1 tablet (5 mg total) by mouth daily 30 tablet 3   • ARIPiprazole (ABILIFY) 5 mg tablet Aripiprazole 5 mg : 1 tablet po daily 30 tablet 2   • aspirin 81 mg chewable tablet Chew 1 tablet (81 mg total) daily     • carvedilol (COREG) 25 mg tablet Take 1 tablet (25 mg total) by mouth 2 (two) times a day with meals 180 tablet 3   • cholecalciferol (VITAMIN D3) 1,000 units tablet Take 5 tablets (5,000 Units total) by mouth daily 90 tablet 5   • clonazePAM (KlonoPIN) 0 5 mg tablet Take 1 tablet (0 5 mg total) by mouth 3 (three) times a day Do not start before 2022  270 tablet 0   • fluvoxaMINE (LUVOX) 100 mg tablet Fluvoxamine 100m tablet in the morning ; 2 tablets at night 270 tablet 0   • lamoTRIgine (LaMICtal) 200 MG tablet Lamotrigine 200m tablet po in the morning + 1 tablet po at night 180 tablet 0   • linaCLOtide (Linzess) 290 MCG CAPS Take 1 capsule by mouth daily (Patient not taking: Reported on 2023) 90 capsule 0   • methocarbamol (ROBAXIN) 500 mg tablet Take 1 tablet (500 mg total) by mouth 3 (three) times a day 90 tablet 0   • omeprazole (PriLOSEC) 40 MG capsule Take 1 capsule (40 mg total) by mouth daily before breakfast 90 capsule 3   • ondansetron (ZOFRAN) 4 mg tablet Take 1 tablet (4 mg total) by mouth every 8 (eight) hours as needed for nausea or vomiting 60 tablet 1   • QUEtiapine (SEROquel) 100 mg tablet Quetiapine 100mg : 1 tablet + 400mg po at night 90 tablet 0   • QUEtiapine (SEROquel) 300 mg tablet Quetiapine 300m tablet po  in morning 90 tablet 0   • QUEtiapine (SEROquel) 400 MG tablet Quetiapine 400m tablet + 100mg tablet po at night 90 tablet 0   • rosuvastatin (CRESTOR) 20 MG tablet Take 1 tablet (20 mg total) by mouth every evening 90 tablet 3   • traMADol (Ultram) 50 mg tablet Take 1 tablet (50 mg total) by mouth every 8 (eight) hours as needed for severe pain 60 tablet 0     No current facility-administered medications on file prior to visit  Labs: I have personally reviewed all pertinent laboratory/tests results     Most Recent Labs:     Admission on 2023, Discharged on 2023   Component Date Value Ref Range Status   • WBC 2023 5 08  4 31 - 10 16 Thousand/uL Final   • RBC 2023 3 56 (L)  3 81 - 5 12 Million/uL Final   • Hemoglobin 2023 11 5  11 5 - 15 4 g/dL Final   • Hematocrit 2023 37 3  34 8 - 46 1 % Final   • MCV 2023 105 (H)  82 - 98 fL Final • MCH 01/17/2023 32 3  26 8 - 34 3 pg Final   • MCHC 01/17/2023 30 8 (L)  31 4 - 37 4 g/dL Final   • RDW 01/17/2023 12 3  11 6 - 15 1 % Final   • MPV 01/17/2023 10 0  8 9 - 12 7 fL Final   • Platelets 67/55/7189 225  149 - 390 Thousands/uL Final   • nRBC 01/17/2023 0  /100 WBCs Final   • Neutrophils Relative 01/17/2023 63  43 - 75 % Final   • Immat GRANS % 01/17/2023 0  0 - 2 % Final   • Lymphocytes Relative 01/17/2023 21  14 - 44 % Final   • Monocytes Relative 01/17/2023 9  4 - 12 % Final   • Eosinophils Relative 01/17/2023 6  0 - 6 % Final   • Basophils Relative 01/17/2023 1  0 - 1 % Final   • Neutrophils Absolute 01/17/2023 3 17  1 85 - 7 62 Thousands/µL Final   • Immature Grans Absolute 01/17/2023 0 01  0 00 - 0 20 Thousand/uL Final   • Lymphocytes Absolute 01/17/2023 1 08  0 60 - 4 47 Thousands/µL Final   • Monocytes Absolute 01/17/2023 0 45  0 17 - 1 22 Thousand/µL Final   • Eosinophils Absolute 01/17/2023 0 32  0 00 - 0 61 Thousand/µL Final   • Basophils Absolute 01/17/2023 0 05  0 00 - 0 10 Thousands/µL Final   • Sodium 01/17/2023 141  135 - 147 mmol/L Final   • Potassium 01/17/2023 4 9  3 5 - 5 3 mmol/L Final   • Chloride 01/17/2023 108  96 - 108 mmol/L Final   • CO2 01/17/2023 25  21 - 32 mmol/L Final   • ANION GAP 01/17/2023 8  4 - 13 mmol/L Final   • BUN 01/17/2023 37 (H)  5 - 25 mg/dL Final   • Creatinine 01/17/2023 2 35 (H)  0 60 - 1 30 mg/dL Final    Standardized to IDMS reference method   • Glucose 01/17/2023 98  65 - 140 mg/dL Final    If the patient is fasting, the ADA then defines impaired fasting glucose as > 100 mg/dL and diabetes as > or equal to 123 mg/dL  Specimen collection should occur prior to Sulfasalazine administration due to the potential for falsely depressed results  Specimen collection should occur prior to Sulfapyridine administration due to the potential for falsely elevated results     • Calcium 01/17/2023 9 3  8 3 - 10 1 mg/dL Final   • AST 01/17/2023 19  5 - 45 U/L Final Specimen collection should occur prior to Sulfasalazine administration due to the potential for falsely depressed results  • ALT 01/17/2023 23  12 - 78 U/L Final    Specimen collection should occur prior to Sulfasalazine administration due to the potential for falsely depressed results  • Alkaline Phosphatase 01/17/2023 201 (H)  46 - 116 U/L Final   • Total Protein 01/17/2023 7 1  6 4 - 8 4 g/dL Final   • Albumin 01/17/2023 3 6  3 5 - 5 0 g/dL Final   • Total Bilirubin 01/17/2023 0 20  0 20 - 1 00 mg/dL Final    Use of this assay is not recommended for patients undergoing treatment with eltrombopag due to the potential for falsely elevated results     • eGFR 01/17/2023 21  ml/min/1 73sq m Final   • Lipase 01/17/2023 205  73 - 393 u/L Final   • Color, UA 01/17/2023 Colorless   Final   • Clarity, UA 01/17/2023 Hazy   Final   • Specific Gravity, UA 01/17/2023 1 015  1 005 - 1 030 Final   • pH, UA 01/17/2023 6 0  4 5, 5 0, 5 5, 6 0, 6 5, 7 0, 7 5, 8 0 Final   • Leukocytes, UA 01/17/2023 Trace (A)  Negative Final   • Nitrite, UA 01/17/2023 Negative  Negative Final   • Protein, UA 01/17/2023 Trace (A)  Negative mg/dl Final   • Glucose, UA 01/17/2023 Negative  Negative mg/dl Final   • Ketones, UA 01/17/2023 Negative  Negative mg/dl Final   • Urobilinogen, UA 01/17/2023 <2 0  <2 0 mg/dl mg/dl Final   • Bilirubin, UA 01/17/2023 Negative  Negative Final   • Occult Blood, UA 01/17/2023 Negative  Negative Final   • RBC, UA 01/17/2023 0-1  None Seen, 0-1, 1-2, 2-4, 0-5 /hpf Final   • WBC, UA 01/17/2023 1-2  None Seen, 0-1, 1-2, 0-5, 2-4 /hpf Final   • Epithelial Cells 01/17/2023 Occasional  None Seen, Occasional /hpf Final   • Bacteria, UA 01/17/2023 None Seen  None Seen, Occasional /hpf Final   Office Visit on 12/30/2022   Component Date Value Ref Range Status   •  COLOR,UA 12/30/2022 lt yellow   Final   • CLARITY,UA 12/30/2022 clear   Final   • SPECIFIC GRAVITY,UA 12/30/2022 1 010   Final   •  PH,UA 12/30/2022 6 5   Final • LEUKOCYTE ESTERASE,UA 12/30/2022 neg   Final   • [place] Alexanders 12/30/2022 neg   Final   • GLUCOSE, UA 12/30/2022 neg   Final   • KETONES,UA 12/30/2022 neg   Final   • BILIRUBIN,UA 12/30/2022 neg   Final   • BLOOD,UA 12/30/2022 neg   Final   • POCT URINE PROTEIN 12/30/2022 +-   Final   • SL AMB POCT UROBILINOGEN 12/30/2022 neg   Final   Hospital Outpatient Visit on 12/29/2022   Component Date Value Ref Range Status   • Protocol Name 12/29/2022 Stefania Hull   Final   • Time In Exercise Phase 12/29/2022 00:03:00   Final   • MAX  SYSTOLIC BP 18/18/6778 689  mmHg Final   • Max Diastolic Bp 95/12/8802 486  mmHg Final   • Max Heart Rate 12/29/2022 122  BPM Final   • Max Predicted Heart Rate 12/29/2022 160  BPM Final   • Reason for Termination 12/29/2022 End of Vasodilator Protocol   Final   • Test Indication 12/29/2022 NAIK   Final   • Target Hr Formular 12/29/2022 (220 - Age)*85%   Final   • Chest Pain Statement 12/29/2022 none   Final   Hospital Outpatient Visit on 12/29/2022   Component Date Value Ref Range Status   • Angina Index 12/29/2022 0   Final   • Baseline HR 12/29/2022 105  bpm Final   • Post peak BP 12/29/2022 188 0  mmHg Final   • Rest Nuclear Isotope Dose 12/29/2022 10 80  mCi Final   • Stress Nuclear Isotope Dose 12/29/2022 32 00  mCi Final   • Base ST Depresion (mm) 12/29/2022 0  mm Final   • ST Depression (mm) 12/29/2022 0  mm Final   • Stress/rest perfusion ratio 12/29/2022 0 87   Final   • EF (%) 12/29/2022 70  % Final     Lipids WNL 08/2022  ________________________________________________________________________    A/P  Olivia Cava y o   female, history of multiple hospitalizations, several suicide attempts, anorexia, OCD, bipolar disorder, presented w/ generalized anxiety due to situational stressors linked to home environment  Current without hypomania since starting Aripiprazole 5mg   Otherwise had several neurovegetative symptoms with psychotic features- delusions- without complete criteria  Passive SI  Deem not acute for hospitalization         DSM5  Unspecified Depression  Unspecified, Bipolar Disorder   Generalized anxiety disorder  Obsessive-compulsive disorder, compulsive           PLAN:    · Discussed diagnostic impression linked to history, external records,  clinical findings regarding depression  · WNL  23 CBC diff, CMP- has kidney dz; glucose-stable, pending TSH  · Increased  Abilify 10 mg        Medications Prescribed During This Encounter at [facility]: ADDENDUM 23 - resent to [facility]    -     ARIPiprazole (ABILIFY) 10 mg tablet; Aripiprazole 10 mg : 1 tablet po daily  -     QUEtiapine (SEROquel) 400 MG tablet; Quetiapine 400m tablet + 100mg tablet po at night  -     QUEtiapine (SEROquel) 300 mg tablet; Quetiapine 300m tablet po  in morning  -     QUEtiapine (SEROquel) 100 mg tablet; Quetiapine 100mg : 1 tablet + 400mg po at night                          -     lamoTRIgine (LaMICtal) 200 MG tablet; Lamotrigine 200m tablet po in the morning + 1 tablet po at night                          -     fluvoxaMINE (LUVOX) 100 mg tablet; Fluvoxamine 100m tablet in the morning ; 2 tablets at night                                Treatement: Risks, benefits, and possible side effects of medications explained to patient and patient verbalizes understanding  Next Appointment at [facility]: 6wks    Today's Appointment@ [facility]  Face To Face:  11:28 AM -11:55 AM;Documentation Time:  12:46 PM- 1:07 PM    Encounter Duration: Time Spent 27 minutes with Patient  Greater than 50% of total time was spent with the patient           MDM  Number of Diagnoses or Management Options  Bipolar disorder, unspecified ([facility] [address] ): established, worsening  Depression, unspecified depression type: new, needed workup  Generalized anxiety disorder: established, worsening     Amount and/or Complexity of Data Reviewed  Clinical lab tests: reviewed  Review and summarize past medical records: yes    Risk of Complications, Morbidity, and/or Mortality  Presenting problems: moderate  Management options: moderate

## 2023-02-03 NOTE — PATIENT INSTRUCTIONS
Clayton Claros [id number]   Thanks for presenting to today's Appointment at [facility]   Increased Aripiprazole 10mg daily  Your other medications were not changed

## 2023-02-06 ENCOUNTER — OFFICE VISIT (OUTPATIENT)
Dept: PSYCHIATRY | Facility: CLINIC | Age: 63
End: 2023-02-06

## 2023-02-06 DIAGNOSIS — F42.9 OBSESSIVE-COMPULSIVE DISORDER, UNSPECIFIED TYPE: ICD-10-CM

## 2023-02-06 DIAGNOSIS — F31.9 BIPOLAR 1 DISORDER (HCC): ICD-10-CM

## 2023-02-06 DIAGNOSIS — M79.601 PAIN OF RIGHT UPPER EXTREMITY: ICD-10-CM

## 2023-02-06 DIAGNOSIS — F31.9 BIPOLAR AFFECTIVE DISORDER, REMISSION STATUS UNSPECIFIED (HCC): ICD-10-CM

## 2023-02-06 DIAGNOSIS — F41.1 GENERALIZED ANXIETY DISORDER: ICD-10-CM

## 2023-02-06 DIAGNOSIS — F32.A DEPRESSION, UNSPECIFIED DEPRESSION TYPE: Primary | ICD-10-CM

## 2023-02-06 DIAGNOSIS — K59.03 DRUG-INDUCED CONSTIPATION: ICD-10-CM

## 2023-02-06 RX ORDER — QUETIAPINE FUMARATE 100 MG/1
TABLET, FILM COATED ORAL
Qty: 90 TABLET | Refills: 0 | Status: SHIPPED | OUTPATIENT
Start: 2023-02-06 | End: 2023-02-07

## 2023-02-06 RX ORDER — LAMOTRIGINE 200 MG/1
TABLET ORAL
Qty: 180 TABLET | Refills: 0 | Status: SHIPPED | OUTPATIENT
Start: 2023-02-06 | End: 2023-02-07

## 2023-02-06 RX ORDER — QUETIAPINE FUMARATE 400 MG/1
TABLET, FILM COATED ORAL
Qty: 90 TABLET | Refills: 0 | Status: SHIPPED | OUTPATIENT
Start: 2023-02-06 | End: 2023-02-07

## 2023-02-06 RX ORDER — ARIPIPRAZOLE 10 MG/1
TABLET ORAL
Qty: 30 TABLET | Refills: 1 | Status: SHIPPED | OUTPATIENT
Start: 2023-02-06 | End: 2023-02-07

## 2023-02-06 RX ORDER — QUETIAPINE FUMARATE 300 MG/1
TABLET, FILM COATED ORAL
Qty: 90 TABLET | Refills: 0 | Status: SHIPPED | OUTPATIENT
Start: 2023-02-06 | End: 2023-02-07

## 2023-02-07 ENCOUNTER — TELEPHONE (OUTPATIENT)
Dept: PSYCHIATRY | Facility: CLINIC | Age: 63
End: 2023-02-07

## 2023-02-07 DIAGNOSIS — F31.9 BIPOLAR 1 DISORDER (HCC): ICD-10-CM

## 2023-02-07 DIAGNOSIS — F41.1 GENERALIZED ANXIETY DISORDER: ICD-10-CM

## 2023-02-07 DIAGNOSIS — K59.03 DRUG-INDUCED CONSTIPATION: ICD-10-CM

## 2023-02-07 DIAGNOSIS — F31.9 BIPOLAR AFFECTIVE DISORDER, REMISSION STATUS UNSPECIFIED (HCC): ICD-10-CM

## 2023-02-07 RX ORDER — LAMOTRIGINE 200 MG/1
TABLET ORAL
Qty: 180 TABLET | Refills: 0 | Status: SHIPPED | OUTPATIENT
Start: 2023-02-07

## 2023-02-07 RX ORDER — ARIPIPRAZOLE 10 MG/1
TABLET ORAL
Qty: 30 TABLET | Refills: 1 | Status: CANCELLED | OUTPATIENT
Start: 2023-02-07

## 2023-02-07 RX ORDER — LAMOTRIGINE 200 MG/1
TABLET ORAL
Qty: 180 TABLET | Refills: 0 | Status: CANCELLED | OUTPATIENT
Start: 2023-02-07

## 2023-02-07 RX ORDER — QUETIAPINE FUMARATE 300 MG/1
TABLET, FILM COATED ORAL
Qty: 90 TABLET | Refills: 0 | Status: CANCELLED | OUTPATIENT
Start: 2023-02-07

## 2023-02-07 RX ORDER — TRAMADOL HYDROCHLORIDE 50 MG/1
50 TABLET ORAL EVERY 8 HOURS PRN
Qty: 60 TABLET | Refills: 0 | Status: SHIPPED | OUTPATIENT
Start: 2023-02-07

## 2023-02-07 RX ORDER — QUETIAPINE FUMARATE 100 MG/1
TABLET, FILM COATED ORAL
Qty: 90 TABLET | Refills: 0 | Status: CANCELLED | OUTPATIENT
Start: 2023-02-07

## 2023-02-07 RX ORDER — QUETIAPINE FUMARATE 100 MG/1
TABLET, FILM COATED ORAL
Qty: 90 TABLET | Refills: 0 | Status: SHIPPED | OUTPATIENT
Start: 2023-02-07

## 2023-02-07 RX ORDER — ARIPIPRAZOLE 10 MG/1
TABLET ORAL
Qty: 30 TABLET | Refills: 1 | Status: SHIPPED | OUTPATIENT
Start: 2023-02-07

## 2023-02-07 RX ORDER — QUETIAPINE FUMARATE 400 MG/1
TABLET, FILM COATED ORAL
Qty: 90 TABLET | Refills: 0 | Status: CANCELLED | OUTPATIENT
Start: 2023-02-07

## 2023-02-07 RX ORDER — QUETIAPINE FUMARATE 400 MG/1
TABLET, FILM COATED ORAL
Qty: 90 TABLET | Refills: 0 | Status: SHIPPED | OUTPATIENT
Start: 2023-02-07

## 2023-02-07 RX ORDER — FLUVOXAMINE MALEATE 100 MG
TABLET ORAL
Qty: 270 TABLET | Refills: 0 | Status: SHIPPED | OUTPATIENT
Start: 2023-02-07

## 2023-02-07 RX ORDER — QUETIAPINE FUMARATE 300 MG/1
TABLET, FILM COATED ORAL
Qty: 90 TABLET | Refills: 0 | Status: SHIPPED | OUTPATIENT
Start: 2023-02-07

## 2023-02-07 NOTE — TELEPHONE ENCOUNTER
Cuba Broussard 1960 chart at [facility] was reviewed- bipolar , anxiety, depression ,OCD  Seroquel , Luvox ,Lamotrigine , Aripiprazole was resent to [facility]   Defer to recent note for details      No  This note was not shared with the patient due to reasonable likelihood of causing patient harm

## 2023-02-07 NOTE — TELEPHONE ENCOUNTER
Patient called and indicated that her Rx was sent to the wrong pharmacy  It was sent to [address] [place] [address], but she needs it to go to [facility]  Patient requests to please transfer the Rx to [facility] for delivery service

## 2023-02-08 DIAGNOSIS — F31.81 BIPOLAR 2 DISORDER (HCC): Chronic | ICD-10-CM

## 2023-02-08 RX ORDER — CLONAZEPAM 0.5 MG/1
0.5 TABLET ORAL 3 TIMES DAILY
Qty: 270 TABLET | Refills: 0 | Status: SHIPPED | OUTPATIENT
Start: 2023-02-08 | End: 2023-02-13 | Stop reason: SDUPTHER

## 2023-02-10 ENCOUNTER — TELEPHONE (OUTPATIENT)
Dept: NEPHROLOGY | Facility: CLINIC | Age: 63
End: 2023-02-10

## 2023-02-10 DIAGNOSIS — I10 PRIMARY HYPERTENSION: ICD-10-CM

## 2023-02-10 RX ORDER — AMLODIPINE BESYLATE 5 MG/1
5 TABLET ORAL DAILY
Qty: 30 TABLET | Refills: 3 | Status: CANCELLED | OUTPATIENT
Start: 2023-02-10

## 2023-02-13 ENCOUNTER — TELEPHONE (OUTPATIENT)
Dept: PSYCHIATRY | Facility: CLINIC | Age: 63
End: 2023-02-13

## 2023-02-13 DIAGNOSIS — F31.81 BIPOLAR 2 DISORDER (HCC): Chronic | ICD-10-CM

## 2023-02-13 DIAGNOSIS — I10 PRIMARY HYPERTENSION: ICD-10-CM

## 2023-02-13 RX ORDER — AMLODIPINE BESYLATE 5 MG/1
5 TABLET ORAL DAILY
Qty: 90 TABLET | Refills: 3 | Status: SHIPPED | OUTPATIENT
Start: 2023-02-13

## 2023-02-13 NOTE — TELEPHONE ENCOUNTER
Patient left message stating that she saw Dr Jareth Salguero last week and has since lost the paper that outlines adjusted medication  Attempted to contact patient but there was no answer and no voicemail picked up  Forwarded request to medical records to mail recent encounter details to pt

## 2023-02-13 NOTE — TELEPHONE ENCOUNTER
Left message with patient letting her know the medication was sent to [facility] 
Renewed and sent to [facility]
rMedication Refills   Person Calling In  Name if not patient Patient    Medication name  Amlodipine    Medication Dosage  Medication Frequency Patient is not sure  Patient thew out bottle  Pharmacy & Location [facility]   Additional Information 90 day supply   phone number is [phone]
none

## 2023-02-14 RX ORDER — CLONAZEPAM 0.5 MG/1
0.5 TABLET ORAL 4 TIMES DAILY
Qty: 360 TABLET | Refills: 0 | Status: SHIPPED | OUTPATIENT
Start: 2023-02-14 | End: 2023-02-21 | Stop reason: SDUPTHER

## 2023-02-20 ENCOUNTER — APPOINTMENT (OUTPATIENT)
Dept: LAB | Facility: HOSPITAL | Age: 63
End: 2023-02-20
Attending: INTERNAL MEDICINE

## 2023-02-20 DIAGNOSIS — N18.4 CKD (CHRONIC KIDNEY DISEASE) STAGE 4, GFR 15-29 ML/MIN (HCC): ICD-10-CM

## 2023-02-20 DIAGNOSIS — I10 PRIMARY HYPERTENSION: ICD-10-CM

## 2023-02-20 LAB
ALBUMIN SERPL BCP-MCNC: 3.9 G/DL (ref 3.5–5)
ALP SERPL-CCNC: 186 U/L (ref 46–116)
ALT SERPL W P-5'-P-CCNC: 31 U/L (ref 12–78)
ANION GAP SERPL CALCULATED.3IONS-SCNC: 6 MMOL/L (ref 4–13)
AST SERPL W P-5'-P-CCNC: 27 U/L (ref 5–45)
BACTERIA UR QL AUTO: ABNORMAL /HPF
BILIRUB SERPL-MCNC: 0.24 MG/DL (ref 0.2–1)
BILIRUB UR QL STRIP: NEGATIVE
BUN SERPL-MCNC: 32 MG/DL (ref 5–25)
CALCIUM SERPL-MCNC: 10 MG/DL (ref 8.3–10.1)
CHLORIDE SERPL-SCNC: 111 MMOL/L (ref 96–108)
CLARITY UR: CLEAR
CO2 SERPL-SCNC: 21 MMOL/L (ref 21–32)
COLOR UR: COLORLESS
CREAT SERPL-MCNC: 2.73 MG/DL (ref 0.6–1.3)
CREAT UR-MCNC: 55.1 MG/DL
ERYTHROCYTE [DISTWIDTH] IN BLOOD BY AUTOMATED COUNT: 12.8 % (ref 11.6–15.1)
GFR SERPL CREATININE-BSD FRML MDRD: 18 ML/MIN/1.73SQ M
GLUCOSE P FAST SERPL-MCNC: 97 MG/DL (ref 65–99)
GLUCOSE UR STRIP-MCNC: NEGATIVE MG/DL
HCT VFR BLD AUTO: 36.6 % (ref 34.8–46.1)
HGB BLD-MCNC: 11.3 G/DL (ref 11.5–15.4)
HGB UR QL STRIP.AUTO: NEGATIVE
KETONES UR STRIP-MCNC: NEGATIVE MG/DL
LEUKOCYTE ESTERASE UR QL STRIP: NEGATIVE
MAGNESIUM SERPL-MCNC: 2.3 MG/DL (ref 1.6–2.6)
MCH RBC QN AUTO: 32.1 PG (ref 26.8–34.3)
MCHC RBC AUTO-ENTMCNC: 30.9 G/DL (ref 31.4–37.4)
MCV RBC AUTO: 104 FL (ref 82–98)
NITRITE UR QL STRIP: NEGATIVE
NON-SQ EPI CELLS URNS QL MICRO: ABNORMAL /HPF
PH UR STRIP.AUTO: 6 [PH]
PHOSPHATE SERPL-MCNC: 3 MG/DL (ref 2.3–4.1)
PLATELET # BLD AUTO: 217 THOUSANDS/UL (ref 149–390)
PMV BLD AUTO: 11.9 FL (ref 8.9–12.7)
POTASSIUM SERPL-SCNC: 4 MMOL/L (ref 3.5–5.3)
PROT SERPL-MCNC: 7.3 G/DL (ref 6.4–8.4)
PROT UR STRIP-MCNC: ABNORMAL MG/DL
PROT UR-MCNC: 20 MG/DL
PROT/CREAT UR: 0.36 MG/G{CREAT} (ref 0–0.1)
PTH-INTACT SERPL-MCNC: 90.3 PG/ML (ref 18.4–80.1)
RBC # BLD AUTO: 3.52 MILLION/UL (ref 3.81–5.12)
RBC #/AREA URNS AUTO: ABNORMAL /HPF
SODIUM SERPL-SCNC: 138 MMOL/L (ref 135–147)
SP GR UR STRIP.AUTO: 1.01 (ref 1–1.03)
URATE SERPL-MCNC: 6 MG/DL (ref 2–7.5)
UROBILINOGEN UR STRIP-ACNC: <2 MG/DL
WBC # BLD AUTO: 5.21 THOUSAND/UL (ref 4.31–10.16)
WBC #/AREA URNS AUTO: ABNORMAL /HPF

## 2023-02-21 ENCOUNTER — TELEPHONE (OUTPATIENT)
Dept: GASTROENTEROLOGY | Facility: CLINIC | Age: 63
End: 2023-02-21

## 2023-02-21 DIAGNOSIS — F31.81 BIPOLAR 2 DISORDER (HCC): Chronic | ICD-10-CM

## 2023-02-21 NOTE — TELEPHONE ENCOUNTER
Tried to leave a message for the patient to reschedule the appt that she missed with us, this afternoon, but her mailbox is full and could not leave a message

## 2023-02-26 RX ORDER — CLONAZEPAM 0.5 MG/1
0.5 TABLET ORAL 4 TIMES DAILY
Qty: 360 TABLET | Refills: 0 | Status: SHIPPED | OUTPATIENT
Start: 2023-02-26

## 2023-02-27 ENCOUNTER — OFFICE VISIT (OUTPATIENT)
Dept: NEPHROLOGY | Facility: HOSPITAL | Age: 63
End: 2023-02-27

## 2023-02-27 ENCOUNTER — OFFICE VISIT (OUTPATIENT)
Dept: GASTROENTEROLOGY | Facility: CLINIC | Age: 63
End: 2023-02-27

## 2023-02-27 VITALS
DIASTOLIC BLOOD PRESSURE: 80 MMHG | HEIGHT: 67 IN | SYSTOLIC BLOOD PRESSURE: 138 MMHG | WEIGHT: 215 LBS | BODY MASS INDEX: 33.74 KG/M2

## 2023-02-27 VITALS
WEIGHT: 215 LBS | BODY MASS INDEX: 33.74 KG/M2 | OXYGEN SATURATION: 94 % | DIASTOLIC BLOOD PRESSURE: 70 MMHG | HEART RATE: 79 BPM | SYSTOLIC BLOOD PRESSURE: 142 MMHG | HEIGHT: 67 IN

## 2023-02-27 DIAGNOSIS — K21.9 GASTROESOPHAGEAL REFLUX DISEASE WITHOUT ESOPHAGITIS: ICD-10-CM

## 2023-02-27 DIAGNOSIS — R93.5 ABNORMAL CT OF THE ABDOMEN: Primary | ICD-10-CM

## 2023-02-27 DIAGNOSIS — Z86.010 HISTORY OF COLON POLYPS: ICD-10-CM

## 2023-02-27 DIAGNOSIS — N18.4 CKD (CHRONIC KIDNEY DISEASE) STAGE 4, GFR 15-29 ML/MIN (HCC): Primary | ICD-10-CM

## 2023-02-27 NOTE — PROGRESS NOTES
OFFICE FOLLOW UP - Nephrology   Whit Cherry 63 y o  female MRN: [id number]    Encounter: [id number]        ASSESSMENT and PLAN:  Diagnoses and all orders for this visit:    47-year-old female with a past medical history of bipolar disorder, stage 4 chronic kidney disease who presents for follow-up    Chronic kidney disease, stage IV  - creatinine fluctuates but seems to have some progression estimated GFR now consistently below 20  - she is currently not on any nephrotoxic agent,  Was on lithium for several years  - renal cyst is stable in size   -she is off her potassium supplementation, d/c all nonessential medications in the setting of recent CT scan results  -blood pressures have been well controlled  -urine protein to creatinine ratios incrased mildly but less than 1/2 gram  -she has had echogenic kidneys that have been small in size since at least 2016  -completed CKD education  - status post AV fistula creation left radiocephalic with good bruit and thrill  - [facility] evaluation with [facility]  - no urgent indicatoin for RRT but monitoring will repeat surveillance in 3-4 months    Bipolar 2 disorder ([facility] [address] ), eating disorder  - she was on lithium in her 25s may have some chronic interstitial nephritis associated with the she also has some mild hypernatremia  - she is currently being treated with Seroquel    Cyst of right kidney  - she has had recent MRIs and CT scan that have been done serially most recently had a CT scan in February of 2020 this is grossly stable partially exophytic nodule left lower pole  -A CT scan from march of 2022 shows unremarkable kidneys and no hydronephrosis    Secondary renal hyperparathyroidism ([address] )  - she has a PTH and been high, calcium is mildly high,  Could be lithium induced vs element of primary HyperPTH    -Holding calcium and vitamin d concern for dysmotility     Proteinuria  - she has a positive urine protein to creatinine ratio but a negative urinalysis she is mildly anemic with CKD  She was on lithium several years ago she also has had acute kidney injury from chronic lactulose  Use  -  limited serologic workup was negative last urine protein to creatinine ratio was negligible    hypertension  -she is now on carvedilol 25 mg BID,  and amiloride 5 mg twice daily-(in the setting of DI associated with chronic lithium use Amiloride was initiated)  -blood pressure is elevated and has been elevated at home  -will restart amlodipine at 5 mg daily monitor edema  -weight is stable-history of eating disorder in the past and follows with psychiatry    Hypernatremia  -her serum sodiums are stable  -continue amiloride 5 mg twice daily for now if potassium is rising because of worsening CKD will have to down titrate her or hold    Elevated LFTs  -LFTs are stable  -continue statin    HPI: Elihu Pallas is a 63 y o  female who is here for No chief complaint on file  Since her last office visit she is doing ok, had some chronic back pain   Ct scan negative for kidney stone  decreased exercise and mobility  No cp, fevers chills  No nausea, vomiting  No diarrhea or constipation  No acute complaints currently      ROS:   All the systems were reviewed and were negative except as documented on the HPI      Allergies: Pollen extract    Medications:   Current Outpatient Medications:   •  acetaminophen (TYLENOL) 650 mg CR tablet, Take 1 tablet (650 mg total) by mouth every 8 (eight) hours as needed for mild pain, Disp: 30 tablet, Rfl: 0  •  AMILoride 5 mg tablet, Take 1 tablet (5 mg total) by mouth 2 (two) times a day, Disp: 180 tablet, Rfl: 3  •  amLODIPine (NORVASC) 5 mg tablet, Take 1 tablet (5 mg total) by mouth daily, Disp: 90 tablet, Rfl: 3  •  ARIPiprazole (ABILIFY) 10 mg tablet, Aripiprazole 10 mg : 1 tablet po daily, Disp: 30 tablet, Rfl: 1  •  aspirin 81 mg chewable tablet, Chew 1 tablet (81 mg total) daily, Disp: , Rfl:   •  carvedilol (COREG) 25 mg tablet, Take 1 tablet (25 mg total) by mouth 2 (two) times a day with meals, Disp: 180 tablet, Rfl: 3  •  clonazePAM (KlonoPIN) 0 5 mg tablet, Take 1 tablet (0 5 mg total) by mouth 4 (four) times a day, Disp: 360 tablet, Rfl: 0  •  fluvoxaMINE (LUVOX) 100 mg tablet, Fluvoxamine 100m tablet in the morning ; 2 tablets at night, Disp: 270 tablet, Rfl: 0  •  lamoTRIgine (LaMICtal) 200 MG tablet, Lamotrigine 200m tablet po in the morning + 1 tablet po at night, Disp: 180 tablet, Rfl: 0  •  methocarbamol (ROBAXIN) 500 mg tablet, Take 1 tablet (500 mg total) by mouth 3 (three) times a day, Disp: 90 tablet, Rfl: 0  •  omeprazole (PriLOSEC) 40 MG capsule, Take 1 capsule (40 mg total) by mouth daily before breakfast, Disp: 90 capsule, Rfl: 3  •  ondansetron (ZOFRAN) 4 mg tablet, Take 1 tablet (4 mg total) by mouth every 8 (eight) hours as needed for nausea or vomiting, Disp: 60 tablet, Rfl: 1  •  QUEtiapine (SEROquel) 100 mg tablet, Quetiapine 100mg : 1 tablet + 400mg po at night, Disp: 90 tablet, Rfl: 0  •  QUEtiapine (SEROquel) 300 mg tablet, Quetiapine 300m tablet po  in morning, Disp: 90 tablet, Rfl: 0  •  QUEtiapine (SEROquel) 400 MG tablet, Quetiapine 400m tablet + 100mg tablet po at night, Disp: 90 tablet, Rfl: 0  •  rosuvastatin (CRESTOR) 20 MG tablet, Take 1 tablet (20 mg total) by mouth every evening, Disp: 90 tablet, Rfl: 3  •  traMADol (Ultram) 50 mg tablet, Take 1 tablet (50 mg total) by mouth every 8 (eight) hours as needed for severe pain, Disp: 60 tablet, Rfl: 0  •  albuterol (Ventolin HFA) 90 mcg/act inhaler, Inhale 2 puffs every 6 (six) hours as needed for wheezing or shortness of breath (Patient not taking: Reported on 2022), Disp: 8 5 g, Rfl: 3  •  cholecalciferol (VITAMIN D3) 1,000 units tablet, Take 5 tablets (5,000 Units total) by mouth daily (Patient not taking: Reported on 2023), Disp: 90 tablet, Rfl: 5  •  linaCLOtide (Linzess) 290 MCG CAPS, Take 1 capsule by mouth daily (Patient not taking: Reported on 2023), Disp: 90 capsule, Rfl: 0    Past Medical History:   Diagnosis Date   • Anxiety    • Anxiety disorder    • Arthritis    • At risk for falls    • Bipolar 2 disorder (HCC)    • Chronic back pain    • Chronic kidney disease    • Closed fracture of distal end of right fibula with routine healing 2020   • COVID-19     in 2021   • CVA (cerebral vascular accident) [facility])     noted on MRI in the past   • Depression    • GERD (gastroesophageal reflux disease)    • Hypercholesteremia    • Hypernatremia    • Hypertension    • Hypokalemia    • Idiopathic chronic pancreatitis ([facility] [address] ) 2018   • Intervertebral disc disorder with radiculopathy of lumbosacral region     resolved: 2015   • Kidney disease    • Limb alert care status     LUE-fistula   • Panic attacks    • Pericardial effusion    • PONV (postoperative nausea and vomiting)    • Psychiatric problem    • Radiculitis     resolved: 2015   • Secondary renal hyperparathyroidism ([facility] )    • Stroke [facility])    • Vitamin D deficiency      Past Surgical History:   Procedure Laterality Date   • BUNIONECTOMY      Left foot    • CAST APPLICATION Right     Procedure: Application short-arm thumb spica splint;  Surgeon: Claudia Bruon MD;  [facility]: [facility];  Service: Orthopedics   • COLON SURGERY     • COLONOSCOPY  2021   • DILATION AND CURETTAGE OF UTERUS     • INDUCED       surgically induced   • AK ARTERIOVENOUS ANASTOMOSIS OPEN DIRECT Left 2019    Procedure: CREATION FISTULA ARTERIOVENOUS (AV) left wrists possible left upper;  Surgeon: Radha Anderson MD;  Location: [facility];  Service: Vascular   • AK CORRJ HALLUX VALGUS W/SESMDC W/DIST Cesia Reach Left 2019    Procedure: Odette Camarena;  Surgeon: Maxi Manuel DPM;  [facility];  Service: Podiatry   • AK CORRJ HALLUX VALGUS W/SESMDC W/DIST Cesia Reach Right 8/3/2020    Procedure: Silvio Wiley;  Surgeon: Maxi Manuel DPM; Location: [facility];  Service: Podiatry   • IL CORRJ HALLUX VALGUS W/SESMDC W/PROX PHLNX OSTEOT Right 9/27/2021    Procedure: Amie Glee, right tameka osteotomy and 2nd claw toe correction;  Surgeon: Edilberto Desir MD;  Location: [facility];  Service: Orthopedics   • IL ERCP DX COLLECTION SPECIMEN BRUSHING/WASHING N/A 4/11/2018    Procedure: ENDOSCOPIC RETROGRADE CHOLANGIOPANCREATOGRAPHY (ERCP); Surgeon: Coco Cortez MD;  Location: [facility];  Service: Gastroenterology   • IL LAPAROSCOPY PROCTOPEXY PROLAPSE N/A 7/13/2018    Procedure: ROBOTIC SIGMOID RESECTION / RECTOPEXY;  Surgeon: Tammi Corona MD;  Location: [facility];  Service: Colorectal   • IL OPEN TREATMENT RADIAL SHAFT FRACTURE Right 10/11/2021    Procedure: OPEN REDUCTION W/ INTERNAL FIXATION (ORIF) RADIUS (WRIST), RIGHT DISTAL;  Surgeon: Eloisa Yanes MD;  [facility]: [facility];  Service: Orthopedics   • IL REMOVAL IMPLANT DEEP Right 6/23/2022    Procedure: Removal of hardware volar aspect right distal radius (distal radial plate and screws);   Surgeon: Anish Muir MD;  Location: [facility];  Service: Orthopedics   • IL SIGMOIDOSCOPY FLX DX W/COLLJ SPEC BR/WA IF PFRMD N/A 7/13/2018    Procedure: Eliel john;  Surgeon: Tammi Corona MD;  [facility]: [facility];  Service: Colorectal   • IL TR TDN RESTORE INTRNSC FUNCJ ALL 4 FNGRS Right 6/23/2022    Procedure: Right ring finger flexor digitorum superficialis to flexor pollicis longus tendon transfer;  Surgeon: Anish Muir MD;  Location: [facility];  Service: Orthopedics   • TUBAL LIGATION Bilateral 1997   • [address] THYROID BIOPSY  7/30/2019     Family History   Problem Relation Age of Onset   • Bipolar disorder Mother    • Mental illness Mother         depression   • Stroke Mother    • Dementia Mother    • Colon polyps Mother    • Heart disease Father    • Hypertension Father    • Diabetes Father    • Other Family         Back disorder   • Diabetes Family    • Heart disease Family    • Hypertension Family    • Stroke Family    • Thyroid disease Family    • Breast cancer Paternal Grandmother         age unknown   • Breast cancer Paternal Aunt         age unknown   • Breast cancer Maternal Aunt         age unknown   • Mental illness Sister    • Colon polyps Sister    • Mental illness Sister    • Heart disease Sister    • No Known Problems Sister    • Breast cancer Sister 76   • Other Son         pituitary tumor   • Hypertension Son    • Obesity Son    • No Known Problems Son    • No Known Problems Maternal Grandmother    • No Known Problems Maternal Grandfather    • No Known Problems Paternal Grandfather    • Breast cancer Paternal Aunt         age unknown   • Substance Abuse Neg Hx         neg fam hx   • Colon cancer Neg Hx       reports that she has never smoked  She has never used smokeless tobacco  She reports that she does not currently use drugs  She reports that she does not drink alcohol  Physical Exam:   Vitals:    02/27/23 0913   BP: 142/70   BP Location: Left arm   Patient Position: Sitting   Cuff Size: Large   Pulse: 79   SpO2: 94%   Weight: 97 5 kg (215 lb)   Height: 5' 7" (1 702 m)     Body mass index is 33 67 kg/m²      General: conscious, cooperative, in no acute distress  Eyes: conjunctivae pink, anicteric sclerae  ENT: lips and mucous membranes moist  Neck: supple, no JVD  Chest: clear breath sounds bilateral, no crackles, ronchus or wheezings  CVS: normal rate, regular rhythm  Abdomen: soft, non-tender, non-distended, normoactive bowel sounds  Extremities:  Trace edema in the lower extremity  Skin: no rash  Neuro: awake, alert, oriented  Psych:  Pleasant affect    Lab Results:    Results for orders placed or performed in visit on 02/20/23   Comprehensive metabolic panel   Result Value Ref Range    Sodium 138 135 - 147 mmol/L    Potassium 4 0 3 5 - 5 3 mmol/L    Chloride 111 (H) 96 - 108 mmol/L    CO2 21 21 - 32 mmol/L    ANION GAP 6 4 - 13 mmol/L BUN 32 (H) 5 - 25 mg/dL    Creatinine 2 73 (H) 0 60 - 1 30 mg/dL    Glucose, Fasting 97 65 - 99 mg/dL    Calcium 10 0 8 3 - 10 1 mg/dL    AST 27 5 - 45 U/L    ALT 31 12 - 78 U/L    Alkaline Phosphatase 186 (H) 46 - 116 U/L    Total Protein 7 3 6 4 - 8 4 g/dL    Albumin 3 9 3 5 - 5 0 g/dL    Total Bilirubin 0 24 0 20 - 1 00 mg/dL    eGFR 18 ml/min/1 73sq m   Magnesium   Result Value Ref Range    Magnesium 2 3 1 6 - 2 6 mg/dL   Phosphorus   Result Value Ref Range    Phosphorus 3 0 2 3 - 4 1 mg/dL   PTH, intact   Result Value Ref Range    PTH 90 3 (H) 18 4 - 80 1 pg/mL   CBC and Platelet   Result Value Ref Range    WBC 5 21 4 31 - 10 16 Thousand/uL    RBC 3 52 (L) 3 81 - 5 12 Million/uL    Hemoglobin 11 3 (L) 11 5 - 15 4 g/dL    Hematocrit 36 6 34 8 - 46 1 %     (H) 82 - 98 fL    MCH 32 1 26 8 - 34 3 pg    MCHC 30 9 (L) 31 4 - 37 4 g/dL    RDW 12 8 11 6 - 15 1 %    Platelets 315 188 - 337 Thousands/uL    MPV 11 9 8 9 - 12 7 fL   Uric acid   Result Value Ref Range    Uric Acid 6 0 2 0 - 7 5 mg/dL   Urinalysis with microscopic   Result Value Ref Range    Color, UA Colorless     Clarity, UA Clear     Specific [place], UA 1 011 1 003 - 1 030    pH, UA 6 0 4 5, 5 0, 5 5, 6 0, 6 5, 7 0, 7 5, 8 0    Leukocytes, UA Negative Negative    Nitrite, UA Negative Negative    Protein, UA Trace (A) Negative mg/dl    Glucose, UA Negative Negative mg/dl    Ketones, UA Negative Negative mg/dl    Urobilinogen, UA <2 0 <2 0 mg/dl mg/dl    Bilirubin, UA Negative Negative    Occult Blood, UA Negative Negative    RBC, UA None Seen None Seen, 1-2 /hpf    WBC, UA 1-2 None Seen, 1-2 /hpf    Epithelial Cells Occasional None Seen, Occasional /hpf    Bacteria, UA None Seen None Seen, Occasional /hpf   Protein / creatinine ratio, urine   Result Value Ref Range    Creatinine, Ur 55 1 mg/dL    Protein Urine Random 20 mg/dL    Prot/Creat Ratio, Ur 0 36 (H) 0 00 - 0 10     *Note: Due to a large number of results and/or encounters for the requested time period, some results have not been displayed  A complete set of results can be found in Results Review  Portions of the record may have been created with voice recognition software  Occasional wrong word or "sound a like" substitutions may have occurred due to the inherent limitations of voice recognition software  Read the chart carefully and recognize, using context, where substitutions have occurred  If you have any questions, please contact the dictating provider

## 2023-02-27 NOTE — PATIENT INSTRUCTIONS
Low-Sodium Diet   WHAT YOU NEED TO KNOW:   What is a low-sodium diet? A low-sodium diet limits foods that are high in sodium (salt)  You will need to follow a low-sodium diet if you have high blood pressure, kidney disease, or heart failure  You may also need to follow this diet if you have a condition that is causing your body to retain (hold) extra fluid  You may need to limit the amount of sodium you eat in a day to 1,500 to 2,000 mg  Ask your healthcare provider how much sodium you can have each day  How can I use food labels to choose foods that are low in sodium? Read food labels to find the amount of sodium they contain  The amount of sodium is listed in milligrams (mg)  The % Daily Value (DV) column tells you how much of your daily needs are met by 1 serving of the food for each nutrient listed  Choose foods that have less than 5% of the DV of sodium  These foods are considered low in sodium  Foods that have 20% or more of the DV of sodium are considered high in sodium  Some food labels may also list any of the following terms that tell you about the sodium content in the food:  Sodium-free:  Less than 5 mg in each serving    Very low sodium:  35 mg of sodium or less in each serving    Low sodium:  140 mg of sodium or less in each serving    Reduced sodium: At least 25% less sodium in each serving than the regular type    Light in sodium:  50% less sodium in each serving    Unsalted or no added salt:  No extra salt is added during processing (the food may still contain sodium)       Which foods should I avoid? Salty foods are high in sodium   You should avoid the following:  Processed foods:      Mixes for cornbread, biscuits, cake, and pudding     Instant foods, such as potatoes, cereals, noodles, and rice     Packaged foods, such as bread stuffing, rice and pasta mixes, snack dip mixes, and macaroni and cheese     Canned foods, such as canned vegetables, soups, broths, sauces, and vegetable or tomato juice    Snack foods, such as salted chips, popcorn, pretzels, pork rinds, salted crackers, and salted nuts    Frozen foods, such as dinners, entrees, vegetables with sauces, and breaded meats    Sauerkraut, pickled vegetables, and other foods prepared in brine    Meats and cheeses:      Smoked or cured meat, such as corned beef, yuan, ham, hot dogs, and sausage    Canned meats or spreads, such as potted meats, sardines, anchovies, and imitation seafood    Deli or lunch meats, such as bologna, ham, turkey, and roast beef    Processed cheese, such as American cheese and cheese spreads    Condiments, sauces, and seasonings:      Salt (¼ teaspoon of salt contains 575 mg of sodium)    Seasonings made with salt, such as garlic salt, celery salt, onion salt, and seasoned salt    Regular soy sauce, barbecue sauce, teriyaki sauce, steak sauce, Worcestershire sauce, and most flavored vinegars    Canned gravy and mixes     Regular condiments, such as mustard, ketchup, and salad dressings    Pickles and olives    Meat tenderizers and monosodium glutamate (MSG)    Which foods can I include? Read the food label to find the amount of sodium in each serving  Bread and cereal:  Try to choose breads with less than 80 mg of sodium per serving  Bread, roll, lisandra, tortilla, or unsalted crackers  Ready-to-eat cereals with less than 5% DV of sodium (examples include shredded wheat and puffed rice)    Pasta    Vegetables and fruits:      Unsalted fresh, frozen, or canned vegetables    Fresh, frozen, or canned fruits    Fruit juice    Dairy:  One serving has about 150 mg of sodium  Milk, all types    Yogurt    Hard cheese, such as cheddar, Swiss, [employer], or [employer] and other protein foods:  Some raw meats may have added sodium       Plain meats, fish, and poultry     Egg    Other foods:      Homemade pudding    Unsalted nuts, popcorn, or pretzels    Unsalted butter or margarine    What are some ways I can get less sodium? If you are used to the flavor of salt, it will take time to get used to low-sodium foods  Your healthcare provider or dietitian can help you create a plan for lowering sodium  The plan will be specific to your needs and your family's needs  You may focus on 1 or 2 changes each week, such as the following: Add spices and herbs to foods instead of salt during cooking  Use salt-free seasonings to add flavor to foods  Examples include onion powder, garlic powder, basil, rosales powder, paprika, and parsley  Try lemon or lime juice or vinegar to give foods a tart flavor  Use hot peppers, pepper, or cayenne pepper to add a spicy flavor  Do not keep a salt shaker at your kitchen table  This may help keep you from adding salt to food at the table  A teaspoon of salt has 2,300 mg of sodium  It may take time to get used to enjoying the natural flavor of food instead of adding salt  Talk to your healthcare provider before you use salt substitutes  Some salt substitutes have a high amount of a chemical called potassium chloride  This form of potassium needs to be avoided if you have kidney disease  Choose low-sodium foods at restaurants  Meals from restaurants are often high in sodium  Some restaurants have nutrition information on the menu that tells you the amount of sodium in their foods  If possible, ask for your food to be prepared with less, or no salt  Shop for unsalted or low-sodium foods and snacks at the grocery store  Examples include unsalted or low-sodium broths, soups, and canned vegetables  Choose fresh or frozen vegetables instead  Choose unsalted nuts or seeds or fresh fruits or vegetables as snacks  Read food labels and choose salt-free, very low-sodium, or low-sodium foods  You can also rinse canned vegetables under running water to remove extra sodium before you cook them  CARE AGREEMENT:   You have the right to help plan your care   Discuss treatment options with your healthcare provider to decide what care you want to receive  You always have the right to refuse treatment  The above information is an  only  It is not intended as medical advice for individual conditions or treatments  Talk to your doctor, nurse or pharmacist before following any medical regimen to see if it is safe and effective for you  © Copyright [employer] 2022 Information is for End User's use only and may not be sold, redistributed or otherwise used for commercial purposes

## 2023-02-27 NOTE — PROGRESS NOTES
[id number] - Outpatient Follow-up Note  Karson rCuz 63 y o  female MRN: [id number]  Encounter: [id number]    ASSESSMENT AND PLAN:      1  Abnormal CT of the abdomen  CAT scan without contrast 12/2022 due to kidney function elevation for abdominal pain, result of CT noted proximately 20 ingested hyperdense tablets/capsules within the cecum  2 are located in the transverse colon  If capsules are still present on KUB will attempt modified bowel prep with reimaging     - XR abdomen 1 view kub; Future    2  Gastroesophageal reflux disease without esophagitis  Patient states she has been taking her omeprazole in the evening and was having increased acid reflux symptoms earlier in the day  Instructed patient to take the omeprazole 40 mg half hour prior to breakfast and we will trial those results for 7 to 10 days  If acid reflux symptoms continue during the day, consider EGD  He also consider adding famotidine 20 mg at bedtime  With patient's polypharmacy, may have gastroparesis effect as well  -Frequent small meals  -Increase head of bed  -No meals 2 to 3 hours prior to bedtime    3  History of colon polyps  Colonoscopy 2021; 3-year recall      Followup Appointment: 3 months  ______________________________________________________________________    Chief Complaint   Patient presents with   • Abdominal Pain     Review Ct scan     HPI: 28-year-old female with significant past medical history including GERD, constipation and rectal prolapse furred by PCP for abnormal CT finding 12/30/2022  He was having abdominal pain and had a CAT scan without contrast due to kidney function elevation, result of CT noted proximately 20 ingested hyperdense tablets/capsules within the cecum  2 are located in the transverse colon  Patient states she does not know what capsules it could be she was taking Tums at one point    She states she is moving her bowels without difficulty and has not noticed any abnormalities  She denies hematochezia or melena  States she does have nausea at times however with increased types of medications may be a slight gastric emptying issue/gastroparesis  She denies vomiting  Non-smoker, denies alcohol use  Patient's last colonoscopy 3/8/2021; 2 polyps, small hemorrhoid, 3-year recall  In discussing patient's GERD side effects  She states its mostly in the evening however patient has been taking omeprazole 40 mg daily in the evening  Requested she take the omeprazole 40 mg daily prior to breakfast   Over the next 7 to 10 days and patient will contact the office with her results  May consider adding famotidine 40 mg at bedtime  Explained no eating prior to bedtime, elevate head of bed, and frequent small meals      Historical Information   Past Medical History:   Diagnosis Date   • Anxiety    • Anxiety disorder    • Arthritis    • At risk for falls    • Bipolar 2 disorder (HCC)    • Chronic back pain    • Chronic kidney disease    • Closed fracture of distal end of right fibula with routine healing 11/04/2020   • COVID-19     in Jan 2021   • CVA (cerebral vascular accident) [facility])     noted on MRI in the past   • Depression    • GERD (gastroesophageal reflux disease)    • Hypercholesteremia    • Hypernatremia    • Hypertension    • Hypokalemia    • Idiopathic chronic pancreatitis ([facility] [address] ) 03/20/2018   • Intervertebral disc disorder with radiculopathy of lumbosacral region     resolved: 12/28/2015   • Kidney disease    • Limb alert care status     LUE-fistula   • Panic attacks    • Pericardial effusion    • PONV (postoperative nausea and vomiting)    • Psychiatric problem    • Radiculitis     resolved: 12/28/2015   • [facility]    • Stroke [facility])    • Vitamin D deficiency      Past Surgical History:   Procedure Laterality Date   • BUNIONECTOMY      Left foot    • CAST APPLICATION Right 1/87/6982    Procedure: Application short-arm thumb spica splint;  Surgeon: Abdelrahman oMody MD;  Location: [facility];  Service: Orthopedics   • COLON SURGERY     • COLONOSCOPY  2021   • DILATION AND CURETTAGE OF UTERUS     • INDUCED       surgically induced   • IA ARTERIOVENOUS ANASTOMOSIS OPEN DIRECT Left 2019    Procedure: CREATION FISTULA ARTERIOVENOUS (AV) left wrists possible left upper;  Surgeon: Amanda Grant MD;  Location: [facility];  Service: Vascular   • IA CORRJ [address] W/SESMDC W/DIST [place] Left 2019    Procedure: Arun Barreto;  Surgeon: Ariana Alfaro DPM;  Location: [facility];  Service: Podiatry   • [address]  W/SESMDC W/DIST [place] Right 8/3/2020    Procedure: Linsey Obrien;  Surgeon: Ariana Alfaro DPM;  Location: [facility];  Service: Podiatry   • IA CORRJ HALLUX VALGUS W/SESMDC W/PROX PHLNX OSTEOT Right 2021    Procedure: Nicole Bard, right tameka osteotomy and 2nd claw toe correction;  Surgeon: Shaan Noyola MD;  Location: [facility];  Service: Orthopedics   • IA ERCP DX COLLECTION SPECIMEN BRUSHING/WASHING N/A 2018    Procedure: ENDOSCOPIC RETROGRADE CHOLANGIOPANCREATOGRAPHY (ERCP); Surgeon: Kit Pagan MD;  [facility];  Service: Gastroenterology   • IA LAPAROSCOPY PROCTOPEXY PROLAPSE N/A 2018    Procedure: ROBOTIC SIGMOID RESECTION / RECTOPEXY;  Surgeon: Lauren Lowe MD;  [facility];  Service: Colorectal   • IA OPEN TREATMENT RADIAL SHAFT FRACTURE Right 10/11/2021    Procedure: OPEN REDUCTION W/ INTERNAL FIXATION (ORIF) RADIUS (WRIST), RIGHT DISTAL;  Surgeon: Lesli Ceballos MD;  Location: [facility];  Service: Orthopedics   • IA REMOVAL IMPLANT DEEP Right 2022    Procedure: Removal of hardware volar aspect right distal radius (distal radial plate and screws);   Surgeon: Abdelrahman Moody MD;  Location: [facility];  Service: Orthopedics   • IA SIGMOIDOSCOPY FLX DX W/COLLJ SPEC BR/WA IF PFRMD N/A 2018 Procedure: SIGMOIDOSCOPY FLEXIBLE;  Surgeon: Dustin Quiroga MD;  [facility];  Service: Colorectal   • MT TR TDN RESTORE INTRNSC FUNCJ ALL 4 FNGRS Right 6/23/2022    Procedure: Right ring finger flexor digitorum superficialis to flexor pollicis longus tendon transfer;  Surgeon: Aleyda Delarosa MD;  Location:  MAIN OR;  Service: Orthopedics   • TUBAL LIGATION Bilateral 1997   • [address] THYROID BIOPSY  7/30/2019     Social History     Substance and Sexual Activity   Alcohol Use Never     Social History     Substance and Sexual Activity   Drug Use Not Currently     Social History     Tobacco Use   Smoking Status Never   Smokeless Tobacco Never     Family History   Problem Relation Age of Onset   • Bipolar disorder Mother    • Mental illness Mother         depression   • Stroke Mother    • Dementia Mother    • Colon polyps Mother    • Heart disease Father    • Hypertension Father    • Diabetes Father    • Other Family         Back disorder   • Diabetes Family    • Heart disease Family    • Hypertension Family    • Stroke Family    • Thyroid disease Family    • Breast cancer Paternal Grandmother         age unknown   • Breast cancer Paternal Aunt         age unknown   • Breast cancer Maternal Aunt         age unknown   • Mental illness Sister    • Colon polyps Sister    • Mental illness Sister    • Heart disease Sister    • No Known Problems Sister    • Breast cancer Sister 76   • Other Son         pituitary tumor   • Hypertension Son    • Obesity Son    • No Known Problems Son    • No Known Problems Maternal Grandmother    • No Known Problems Maternal Grandfather    • No Known Problems Paternal Grandfather    • Breast cancer Paternal Aunt         age unknown   • Substance Abuse Neg Hx         neg fam hx   • Colon cancer Neg Hx          Current Outpatient Medications:   •  acetaminophen (TYLENOL) 650 mg CR tablet  •  albuterol (Ventolin HFA) 90 mcg/act inhaler  •  AMILoride 5 mg tablet  •  amLODIPine (NORVASC) 5 mg tablet  •  ARIPiprazole (ABILIFY) 10 mg tablet  •  aspirin 81 mg chewable tablet  •  carvedilol (COREG) 25 mg tablet  •  cholecalciferol (VITAMIN D3) 1,000 units tablet  •  clonazePAM (KlonoPIN) 0 5 mg tablet  •  fluvoxaMINE (LUVOX) 100 mg tablet  •  lamoTRIgine (LaMICtal) 200 MG tablet  •  methocarbamol (ROBAXIN) 500 mg tablet  •  omeprazole (PriLOSEC) 40 MG capsule  •  ondansetron (ZOFRAN) 4 mg tablet  •  QUEtiapine (SEROquel) 100 mg tablet  •  QUEtiapine (SEROquel) 300 mg tablet  •  QUEtiapine (SEROquel) 400 MG tablet  •  rosuvastatin (CRESTOR) 20 MG tablet  •  traMADol (Ultram) 50 mg tablet  Allergies   Allergen Reactions   • Pollen Extract Nasal Congestion     Reviewed medications and allergies and updated as indicated    PHYSICAL EXAM:    Blood pressure 138/80, height 5' 7" (1 702 m), weight 97 5 kg (215 lb), not currently breastfeeding  Body mass index is 33 67 kg/m²  General Appearance: NAD, cooperative, alert  Eyes: Anicteric, PERRLA, EOMI  ENT:  Normocephalic, atraumatic, normal mucosa  Neck:  Supple, symmetrical, trachea midline  Resp:  Clear to auscultation bilaterally; no rales, rhonchi or wheezing; respirations unlabored   CV:  S1 S2, Regular rate and rhythm; no murmur, rub, or gallop  GI:  Soft, non-tender, non-distended; normal bowel sounds; no masses, no organomegaly   Rectal: Deferred  Musculoskeletal: No cyanosis, clubbing or edema  Normal ROM  Patient states she does have right flank discomfort and right lower back discomfort    Skin:  No jaundice, rashes, or lesions   Heme/Lymph: No palpable cervical lymphadenopathy  Psych: Normal affect, good eye contact  Neuro: No gross deficits, AAOx3    Lab Results:   Lab Results   Component Value Date    WBC 5 21 02/20/2023    HGB 11 3 (L) 02/20/2023    HCT 36 6 02/20/2023     (H) 02/20/2023     02/20/2023     Lab Results   Component Value Date     02/27/2017    K 4 0 02/20/2023     (H) 02/20/2023    CO2 21 02/20/2023    BUN 32 (H) 02/20/2023    CREATININE 2 73 (H) 02/20/2023    GLUF 97 02/20/2023    CALCIUM 10 0 02/20/2023    CORRECTEDCA 10 2 (H) 10/05/2022    AST 27 02/20/2023    ALT 31 02/20/2023    ALKPHOS 186 (H) 02/20/2023    EGFR 18 02/20/2023     Lab Results   Component Value Date    IRON 51 05/26/2021    TIBC 322 05/26/2021    FERRITIN 38 05/26/2021     Lab Results   Component Value Date    LIPASE 205 01/17/2023       Radiology Results:   No results found

## 2023-02-27 NOTE — PATIENT INSTRUCTIONS
Omeprazole 40 mg daily 30-60 minutes prior to breakfast   Frequent small meals  Head of bed elvated  No food 2-3 hrs prior to bed

## 2023-03-02 DIAGNOSIS — F41.1 GENERALIZED ANXIETY DISORDER: ICD-10-CM

## 2023-03-02 DIAGNOSIS — F31.9 BIPOLAR AFFECTIVE DISORDER, REMISSION STATUS UNSPECIFIED (HCC): ICD-10-CM

## 2023-03-03 DIAGNOSIS — M20.11 ACQUIRED HALLUX INTERPHALANGEUS, RIGHT: ICD-10-CM

## 2023-03-03 DIAGNOSIS — M20.5X1 CLAW TOE, ACQUIRED, RIGHT: ICD-10-CM

## 2023-03-03 DIAGNOSIS — S99.921A INJURY OF PLANTAR PLATE, RIGHT, INITIAL ENCOUNTER: ICD-10-CM

## 2023-03-03 RX ORDER — ONDANSETRON 4 MG/1
4 TABLET, FILM COATED ORAL EVERY 8 HOURS PRN
Qty: 60 TABLET | Refills: 1 | Status: SHIPPED | OUTPATIENT
Start: 2023-03-03

## 2023-03-07 ENCOUNTER — TELEPHONE (OUTPATIENT)
Dept: GASTROENTEROLOGY | Facility: CLINIC | Age: 63
End: 2023-03-07

## 2023-03-07 ENCOUNTER — HOSPITAL ENCOUNTER (OUTPATIENT)
Dept: RADIOLOGY | Facility: HOSPITAL | Age: 63
Discharge: HOME | End: 2023-03-07

## 2023-03-07 DIAGNOSIS — R93.5 ABNORMAL CT OF THE ABDOMEN: ICD-10-CM

## 2023-03-07 NOTE — TELEPHONE ENCOUNTER
Patients GI provider:  Eric John    Number to return call: [phone]    Reason for call: Naun Pereira calling from Radiology w/ immediate finding from pt's x-ray of abdomen      Scheduled procedure/appointment date if applicable: Appt: 7/43/1861

## 2023-03-08 DIAGNOSIS — M79.601 PAIN OF RIGHT UPPER EXTREMITY: ICD-10-CM

## 2023-03-08 RX ORDER — TRAMADOL HYDROCHLORIDE 50 MG/1
50 TABLET ORAL EVERY 8 HOURS PRN
Qty: 60 TABLET | Refills: 0 | Status: SHIPPED | OUTPATIENT
Start: 2023-03-08

## 2023-03-16 ENCOUNTER — APPOINTMENT (EMERGENCY)
Dept: RADIOLOGY | Facility: HOSPITAL | Age: 63
End: 2023-03-16

## 2023-03-16 ENCOUNTER — HOSPITAL ENCOUNTER (EMERGENCY)
Facility: HOSPITAL | Age: 63
Discharge: HOME | End: 2023-03-16
Attending: EMERGENCY MEDICINE

## 2023-03-16 VITALS
RESPIRATION RATE: 17 BRPM | DIASTOLIC BLOOD PRESSURE: 77 MMHG | TEMPERATURE: 97.8 F | HEART RATE: 78 BPM | HEIGHT: 68 IN | OXYGEN SATURATION: 95 % | WEIGHT: 200 LBS | SYSTOLIC BLOOD PRESSURE: 146 MMHG | BODY MASS INDEX: 30.31 KG/M2

## 2023-03-16 DIAGNOSIS — S86.911A KNEE STRAIN, RIGHT, INITIAL ENCOUNTER: Primary | ICD-10-CM
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
## 2023-03-16 DIAGNOSIS — S29.011A MUSCLE STRAIN OF CHEST WALL, INITIAL ENCOUNTER: ICD-10-CM

## 2023-03-16 RX ORDER — HYDROCODONE BITARTRATE AND ACETAMINOPHEN 5; 325 MG/1; MG/1
1 TABLET ORAL ONCE
Status: COMPLETED | OUTPATIENT
Start: 2023-03-16 | End: 2023-03-16

## 2023-03-16 RX ADMIN — HYDROCODONE BITARTRATE AND ACETAMINOPHEN 1 TABLET: 5; 325 TABLET ORAL at 09:21

## 2023-03-16 NOTE — ED PROVIDER NOTES
History  Chief Complaint   Patient presents with   • Knee Pain     Patient reports to ED c/o right knee pain after falling on   Patient reports "I was having problems getting up"  46-year-old female states that she tripped carrying packages home from the store 4 days ago or 5 days ago  She landed prone, dropping the packages  She has increasing pain in the right thigh radiating to the knee  It hurts when she attempts to get up off the chair  Also endorses some soreness of the sternocleidomastoid muscles  Prior to Admission Medications   Prescriptions Last Dose Informant Patient Reported? Taking?    AMILoride 5 mg tablet   No Yes   Sig: Take 1 tablet (5 mg total) by mouth 2 (two) times a day   ARIPiprazole (ABILIFY) 10 mg tablet   No Yes   Sig: Aripiprazole 10 mg : 1 tablet po daily   QUEtiapine (SEROquel) 100 mg tablet   No Yes   Sig: Quetiapine 100mg : 1 tablet + 400mg po at night   QUEtiapine (SEROquel) 300 mg tablet   No Yes   Sig: Quetiapine 300m tablet po  in morning   QUEtiapine (SEROquel) 400 MG tablet   No Yes   Sig: Quetiapine 400m tablet + 100mg tablet po at night   acetaminophen (TYLENOL) 650 mg CR tablet   No Yes   Sig: Take 1 tablet (650 mg total) by mouth every 8 (eight) hours as needed for mild pain   albuterol (Ventolin HFA) 90 mcg/act inhaler   No No   Sig: Inhale 2 puffs every 6 (six) hours as needed for wheezing or shortness of breath   amLODIPine (NORVASC) 5 mg tablet   No Yes   Sig: Take 1 tablet (5 mg total) by mouth daily   aspirin 81 mg chewable tablet   No Yes   Sig: Chew 1 tablet (81 mg total) daily   carvedilol (COREG) 25 mg tablet   No Yes   Sig: Take 1 tablet (25 mg total) by mouth 2 (two) times a day with meals   cholecalciferol (VITAMIN D3) 1,000 units tablet   No Yes   Sig: Take 5 tablets (5,000 Units total) by mouth daily   clonazePAM (KlonoPIN) 0 5 mg tablet   No Yes   Sig: Take 1 tablet (0 5 mg total) by mouth 4 (four) times a day   fluvoxaMINE (LUVOX) 100 mg tablet   No Yes   Sig: Fluvoxamine 100m tablet in the morning ; 2 tablets at night   lamoTRIgine (LaMICtal) 200 MG tablet   No Yes   Sig: Lamotrigine 200m tablet po in the morning + 1 tablet po at night   methocarbamol (ROBAXIN) 500 mg tablet   No No   Sig: Take 1 tablet (500 mg total) by mouth 3 (three) times a day   omeprazole (PriLOSEC) 40 MG capsule   No No   Sig: Take 1 capsule (40 mg total) by mouth daily before breakfast   ondansetron (ZOFRAN) 4 mg tablet   No No   Sig: Take 1 tablet (4 mg total) by mouth every 8 (eight) hours as needed for nausea or vomiting   rosuvastatin (CRESTOR) 20 MG tablet   No Yes   Sig: Take 1 tablet (20 mg total) by mouth every evening   traMADol (Ultram) 50 mg tablet   No No   Sig: Take 1 tablet (50 mg total) by mouth every 8 (eight) hours as needed for severe pain      Facility-Administered Medications: None       Past Medical History:   Diagnosis Date   • Anxiety    • Anxiety disorder    • Arthritis    • At risk for falls    • Bipolar 2 disorder (HCC)    • Chronic back pain    • Chronic kidney disease    • Closed fracture of distal end of right fibula with routine healing 2020   • COVID-19     in 2021   • CVA (cerebral vascular accident) [facility])     noted on MRI in the past   • Depression    • GERD (gastroesophageal reflux disease)    • Hypercholesteremia    • Hypernatremia    • Hypertension    • Hypokalemia    • Idiopathic [facility] 2018   • Intervertebral disc disorder with radiculopathy of lumbosacral region     resolved: 2015   • Kidney disease    • Limb alert care status     LUE-fistula   • Panic attacks    • Pericardial effusion    • PONV (postoperative nausea and vomiting)    • Psychiatric problem    • Radiculitis     resolved: 2015   • Secondary renal hyperparathyroidism ([facility] )    • Stroke [facility])    • Vitamin D deficiency        Past Surgical History:   Procedure Laterality Date   • BUNIONECTOMY      Left foot    • CAST APPLICATION Right     Procedure: Application short-arm thumb spica splint;  Surgeon: Alisia Ramirez MD;  Location: [facility];  Service: Orthopedics   • COLON SURGERY     • COLONOSCOPY  2021   • DILATION AND CURETTAGE OF UTERUS     • INDUCED       surgically induced   • NE ARTERIOVENOUS ANASTOMOSIS OPEN DIRECT Left 2019    Procedure: CREATION FISTULA ARTERIOVENOUS (AV) left wrists possible left upper;  Surgeon: Danial Solorzano MD;  Location: [facility];  Service: Vascular   • NE CORRJ [address] W/SESMDC W/DIST Gabrielle Lias Left 2019    Procedure: Piper Carpenter;  Surgeon: Silvia Payne DPM;  Location[facility];  Service: Podiatry   • [address]  [address]  W/SESMDC W/DIST Gabrielle Lias Right 8/3/2020    Procedure: Zoilaalee Gray;  Surgeon: Silvia Payne DPM;  Location: [facility];  Service: Podiatry   • NE CORRJ HALLUX VALGUS W/SESMDC W/PROX PHLNX OSTEOT Right 2021    Procedure: Burnard Aguada, right tameka osteotomy and 2nd claw toe correction;  Surgeon: Shekhar Pascal MD;  Location: [facility];  Service: Orthopedics   • NE ERCP DX COLLECTION SPECIMEN BRUSHING/WASHING N/A 2018    Procedure: ENDOSCOPIC RETROGRADE CHOLANGIOPANCREATOGRAPHY (ERCP); Surgeon: Josias Parra MD;  Location[facility];  Service: Gastroenterology   • NE LAPAROSCOPY PROCTOPEXY PROLAPSE N/A 2018    Procedure: ROBOTIC SIGMOID RESECTION / RECTOPEXY;  Surgeon: Denver Canavan, MD;  Location[facility];  Service: Colorectal   • NE OPEN TREATMENT RADIAL SHAFT FRACTURE Right 10/11/2021    Procedure: OPEN REDUCTION W/ INTERNAL FIXATION (ORIF) RADIUS (WRIST), RIGHT DISTAL;  Surgeon: Goran Yepez MD;  Location: [facility];  Service: Orthopedics   • NE REMOVAL IMPLANT DEEP Right 2022    Procedure: Removal of hardware volar aspect right distal radius (distal radial plate and screws);   Surgeon: Alisia Ramirez MD;  [facility]: [facility];  Service: Orthopedics   • NE SIGMOIDOSCOPY FLX DX W/COLLJ SPEC BR/WA IF PFRMD N/A 7/13/2018    Procedure: Destinity Lilli;  Surgeon: Dustin Quiroga MD;  [facility];  Service: Colorectal   • IA TR TDN RESTORE INTRNSC FUNCJ ALL 4 FNGRS Right 6/23/2022    Procedure: Right ring finger flexor digitorum superficialis to flexor pollicis longus tendon transfer;  Surgeon: Aleyda Delarosa MD;  Location: [facility];  Service: Orthopedics   • TUBAL LIGATION Bilateral 1997   • Donnie [address] THYROID BIOPSY  7/30/2019       Family History   Problem Relation Age of Onset   • Bipolar disorder Mother    • Mental illness Mother         depression   • Stroke Mother    • Dementia Mother    • Colon polyps Mother    • Heart disease Father    • Hypertension Father    • Diabetes Father    • Other Family         Back disorder   • Diabetes Family    • Heart disease Family    • Hypertension Family    • Stroke Family    • Thyroid disease Family    • Breast cancer Paternal Grandmother         age unknown   • Breast cancer Paternal Aunt         age unknown   • Breast cancer Maternal Aunt         age unknown   • Mental illness Sister    • Colon polyps Sister    • Mental illness Sister    • Heart disease Sister    • No Known Problems Sister    • Breast cancer Sister 76   • Other Son         pituitary tumor   • Hypertension Son    • Obesity Son    • No Known Problems Son    • No Known Problems Maternal Grandmother    • No Known Problems Maternal Grandfather    • No Known Problems Paternal Grandfather    • Breast cancer Paternal Aunt         age unknown   • Substance Abuse Neg Hx         neg fam hx   • Colon cancer Neg Hx      I have reviewed and agree with the history as documented      E-Cigarette/Vaping   • E-Cigarette Use Never User      E-Cigarette/Vaping Substances   • Nicotine No    • THC No    • CBD No    • Flavoring No    • Other No    • Unknown No      Social History     Tobacco Use   • Smoking status: Never   • Smokeless tobacco: Never   Vaping Use • Vaping Use: Never used   Substance Use Topics   • Alcohol use: Never   • Drug use: Not Currently       Review of Systems   Constitutional: Negative for fever  Respiratory: Negative for shortness of breath  Cardiovascular: Negative for chest pain  Gastrointestinal: Negative for abdominal pain  Musculoskeletal: Positive for back pain ( chronic), gait problem and joint swelling  Negative for neck stiffness  Skin: Negative for rash  Neurological: Positive for weakness  Negative for syncope and headaches  All other systems reviewed and are negative  Physical Exam  Physical Exam  Vitals and nursing note reviewed  Constitutional:       General: She is not in acute distress  Appearance: She is well-developed  She is obese  She is not ill-appearing or diaphoretic  HENT:      Head: Normocephalic and atraumatic  Right Ear: External ear normal       Left Ear: External ear normal       Nose: Nose normal    Eyes:      Conjunctiva/sclera: Conjunctivae normal       Pupils: Pupils are equal, round, and reactive to light  Cardiovascular:      Rate and Rhythm: Normal rate and regular rhythm  Pulses: Normal pulses  Heart sounds: No murmur heard  Comments: 3/6 holosystolic M best heard at the base  Pulmonary:      Effort: Pulmonary effort is normal       Breath sounds: Normal breath sounds  Abdominal:      General: Bowel sounds are normal       Palpations: Abdomen is soft  Tenderness: There is no abdominal tenderness  There is no guarding or rebound  Musculoskeletal:         General: Swelling ( right suprapatellar region) and tenderness ( just cephalad of the right knee ) present  No deformity  Normal range of motion  Cervical back: Normal range of motion and neck supple  No tenderness ( no posterior tenderness)  Skin:     General: Skin is warm and dry  Capillary Refill: Capillary refill takes less than 2 seconds  Findings: No bruising or rash  Neurological:      General: No focal deficit present  Mental Status: She is alert and oriented to person, place, and time  Mental status is at baseline  Cranial Nerves: No cranial nerve deficit  Sensory: No sensory deficit  Motor: No weakness  Coordination: Coordination normal       Gait: Gait abnormal       Deep Tendon Reflexes: Reflexes are normal and symmetric  Psychiatric:         Mood and Affect: Mood normal          Behavior: Behavior normal          Vital Signs  ED Triage Vitals [03/16/23 0854]   Temperature Pulse Respirations Blood Pressure SpO2   97 8 °F (36 6 °C) 78 18 147/67 95 %      Temp Source Heart Rate Source Patient Position - Orthostatic VS BP Location FiO2 (%)   Oral Monitor Sitting Right arm --      Pain Score       7           Vitals:    03/16/23 0854 03/16/23 0900 03/16/23 0930 03/16/23 1000   BP: 147/67 137/60 151/70 146/77   Pulse: 78 78 76 78   Patient Position - Orthostatic VS: Sitting Sitting Sitting Sitting         Visual Acuity      ED Medications  Medications   HYDROcodone-acetaminophen (NORCO) 5-325 mg per tablet 1 tablet (1 tablet Oral Given 3/16/23 1521)       Diagnostic Studies  Results Reviewed     None                 XR chest 1 view portable   ED Interpretation by Houston Austin DO (03/16 1027)   No acute cardiopulmonary disease      Final Result by Nallely Bowers MD (03/17 1173)      No acute cardiopulmonary disease  Workstation performed: [id number]         XR knee 4+ views Right injury   ED Interpretation by Houston Austin DO (03/16 1027)   No acute fracture or dislocation      Final Result by Moriah Mata DO (03/17 3211)      No acute osseous abnormality  Mild osteoarthritis        Workstation performed: [id number]                    Procedures  Procedures         ED Course                               SBIRT 22yo+    Flowsheet Row Most Recent Value   SBIRT (25 yo +)    In order to provide better care to our patients, we are screening all of our patients for alcohol and drug use  Would it be okay to ask you these screening questions? Yes Filed at: 03/16/2023 4991   Initial Alcohol Screen: US AUDIT-C     1  How often do you have a drink containing alcohol? 0 Filed at: 03/16/2023 0905   2  How many drinks containing alcohol do you have on a typical day you are drinking? 0 Filed at: 03/16/2023 0905   3a  Male UNDER 65: How often do you have five or more drinks on one occasion? 0 Filed at: 03/16/2023 0905   3b  FEMALE Any Age, or MALE 65+: How often do you have 4 or more drinks on one occassion? 0 Filed at: 03/16/2023 0905   Audit-C Score 0 Filed at: 03/16/2023 9246   ZAYRA: How many times in the past year have you    Used an illegal drug or used a prescription medication for non-medical reasons? Never Filed at: 03/16/2023 [facility] Lena Making  64 yo F c/o right thigh, SCM muscle soreness after ground level  trip and fall several days ago  No evidence of acute Fx or dislocaton, no knee fluctuance, stable ligaments  Mild b/l SCM muscle tenderness without bone tenderness, deformity or airway compromise  Will image right knee to r/o fracture, effusion  Slight tenderness right lower posterolaterral ribs without crepitance or deformity  Will image chest     Imaging reassuring  Ace wrap placed on right knee  Has Tramadol Rx at home  To f/u with PCP as needed  Fall from standing, initial encounter: acute illness or injury  Knee strain, right, initial encounter: acute illness or injury  Muscle strain of chest wall, initial encounter: acute illness or injury  Amount and/or Complexity of Data Reviewed  Radiology: ordered and independent interpretation performed  Risk  Prescription drug management            Disposition  Final diagnoses:   Knee strain, right, initial encounter   Fall from standing, initial encounter   Muscle strain of chest wall, initial encounter     Time reflects when diagnosis was documented in both MDM as applicable and the Disposition within this note     Time User Action Codes Description Comment    3/16/2023 10:29 AM Shane Mccormick Add [O98 296Y] Knee strain, right, initial encounter     3/16/2023 10:31 AM Shane Mccormick Add [G64  XXXA] Fall from standing, initial encounter     3/16/2023 10:32 AM Shane Mccormick Add [U73 919C] Muscle strain of chest wall, initial encounter       ED Disposition     ED Disposition   Discharge    Condition   Stable    Date/Time   Thu Mar 16, 2023 10:28 AM    Comment   Jose Josue [facility] discharge to home/self care                 Follow-up Information     Follow up With Specialties Details Why Colleen Mabry 104, DO Internal Medicine Call  As needed Emilio  [address] [facility] [address] [facility]  [phone]            Discharge Medication List as of 3/16/2023 10:40 AM      CONTINUE these medications which have NOT CHANGED    Details   acetaminophen (TYLENOL) 650 mg CR tablet Take 1 tablet (650 mg total) by mouth every 8 (eight) hours as needed for mild pain, Starting Thu 6/23/2022, Normal      albuterol (Ventolin HFA) 90 mcg/act inhaler Inhale 2 puffs every 6 (six) hours as needed for wheezing or shortness of breath, Starting Mon 5/3/2021, Normal      AMILoride 5 mg tablet Take 1 tablet (5 mg total) by mouth 2 (two) times a day, Starting Wed 2/1/2023, Normal      amLODIPine (NORVASC) 5 mg tablet Take 1 tablet (5 mg total) by mouth daily, Starting Mon 2/13/2023, Normal      ARIPiprazole (ABILIFY) 10 mg tablet Aripiprazole 10 mg : 1 tablet po daily, Normal      aspirin 81 mg chewable tablet Chew 1 tablet (81 mg total) daily, Starting Tue 12/28/2021, No Print      carvedilol (COREG) 25 mg tablet Take 1 tablet (25 mg total) by mouth 2 (two) times a day with meals, Starting Mon 6/6/2022, Normal      cholecalciferol (VITAMIN D3) 1,000 units tablet Take 5 tablets (5,000 Units total) by mouth daily, Starting Mon 1/31/2022, Normal clonazePAM (KlonoPIN) 0 5 mg tablet Take 1 tablet (0 5 mg total) by mouth 4 (four) times a day, Starting Sun 2023, Normal      fluvoxaMINE (LUVOX) 100 mg tablet Fluvoxamine 100m tablet in the morning ; 2 tablets at night, Normal      lamoTRIgine (LaMICtal) 200 MG tablet Lamotrigine 200m tablet po in the morning + 1 tablet po at night, Normal      methocarbamol (ROBAXIN) 500 mg tablet Take 1 tablet (500 mg total) by mouth 3 (three) times a day, Starting Mon 2023, Normal      omeprazole (PriLOSEC) 40 MG capsule Take 1 capsule (40 mg total) by mouth daily before breakfast, Starting 2022, Normal      ondansetron (ZOFRAN) 4 mg tablet Take 1 tablet (4 mg total) by mouth every 8 (eight) hours as needed for nausea or vomiting, Starting Fri 3/3/2023, Normal      !! QUEtiapine (SEROquel) 100 mg tablet Quetiapine 100mg : 1 tablet + 400mg po at night, Normal      !! QUEtiapine (SEROquel) 300 mg tablet Quetiapine 300m tablet po  in morning, Normal      !! QUEtiapine (SEROquel) 400 MG tablet Quetiapine 400m tablet + 100mg tablet po at night, Normal      rosuvastatin (CRESTOR) 20 MG tablet Take 1 tablet (20 mg total) by mouth every evening, Starting 2022, Normal      traMADol (Ultram) 50 mg tablet Take 1 tablet (50 mg total) by mouth every 8 (eight) hours as needed for severe pain, Starting Wed 3/8/2023, Normal       !! - Potential duplicate medications found  Please discuss with provider  No discharge procedures on file      PDMP Review       Value Time User    PDMP Reviewed  Yes 3/16/2023 10:32 AM Rosaline Tolbert DO          ED Provider  Electronically Signed by           Rosaline Tolbert DO  [id number]

## 2023-03-16 NOTE — ED NOTES
Pt ambulatory independently to waiting room  Pt reported she will wait for ride in waiting room        Mat Rader RN  03/16/23 8741

## 2023-03-16 NOTE — DISCHARGE INSTRUCTIONS
Use Ace wrap as needed for pain  Take Tylenol as needed for pain  Reserve the tramadol for severe pain  Call your PCP if needed, if not better in 4-5 days

## 2023-03-17 ENCOUNTER — VBI (OUTPATIENT)
Dept: FAMILY MEDICINE CLINIC | Facility: HOSPITAL | Age: 63
End: 2023-03-17

## 2023-03-17 NOTE — TELEPHONE ENCOUNTER
Gisselle [place]    ED Visit Information     Ed visit date: 3-16-23  Diagnosis Description: Knee strain, right, initial encounter, Fall from standing, initial encounter, Muscle strain of chest wall, initial encounter  In Network? Yes [place]   Discharge status: Home  Discharged with meds ? No  Number of ED visits to date: 3  ED Severity:N/A     Outreach Information    Outreach successful: No 2  Date letter mailed:3-20-23  Date Finalized:3-20-23    Care Coordination    Follow up appointment with pcp: no PCP f/u  Transportation issues ?  NA    Value Base Outreach    Outreach type: 7 Day Outreach  Emergent necessity warranted by diagnosis: No  [facility] PCP: Yes  Transportation: Self Transport  03/17/2023 09:27 AM EDT by Latricia Hammans 03/17/2023 09:27 AM EDT by Latricia Hammans [facility] (Self) [phone] (PBCC)[phone] (Mobile)  [phone] (Mobile) Remove  - Left MessageCommunicated - Attempt 1    03/20/2023 08:33 AM EDT by Latricia Hammans 03/20/2023 08:33 AM EDT by Latricia Hammans [facility] Sena Harvey (Self) [phone] (NMTCY)[phone] (Mobile)  [phone] (Mobile) Remove  - Left MessageCommunicated - Attempt 2

## 2023-03-17 NOTE — LETTER
[facility] [address] [facility]    Date: 03/20/23  [address],[facility] [address] [address]    Dear Prachi Christine: Thank you for choosing [facility] emergency department for care  As your primary care provider we want to make sure that your ongoing medical care is being addressed  If you require follow up care as a result of your emergency department visit, there are a few things we would like you to know  As part of our continuing commitment to caring for our patient, we have added more same day appointments and have extended our office hours to meet your medical needs  After hours, on-call physicians are available via our main office line  We encourage you to contact our office prior to seeking treatment to discuss your symptoms with our medical staff  Together, we can determine the correct course of action  A majority of non-emergent conditions such as: common cold, flu-like symptoms, fevers, strains/sprains, dislocations, minor burns, cuts and animal bites can be treated at [address] [place] [address] facilities  Diagnostic testing is available at some sites  Of course, if you are experiencing a life threatening medical emergency call 911 or proceed directly to the nearest emergency room      Your nearest [address] [place] [address] facility is conveniently located at:    [facility] [address] [facility] offered at most [facility] locations  [place] your spot online at www [facility] [email or web address] or on the [facility] [address]    Sincerely,    [address]   Dept: [phone]

## 2023-03-24 ENCOUNTER — TELEPHONE (OUTPATIENT)
Dept: FAMILY MEDICINE CLINIC | Facility: HOSPITAL | Age: 63
End: 2023-03-24

## 2023-03-24 ENCOUNTER — TELEPHONE (OUTPATIENT)
Dept: NEPHROLOGY | Facility: CLINIC | Age: 63
End: 2023-03-24

## 2023-03-24 DIAGNOSIS — H66.90 ACUTE OTITIS MEDIA, UNSPECIFIED OTITIS MEDIA TYPE: Primary | ICD-10-CM

## 2023-03-24 RX ORDER — CEFUROXIME AXETIL 500 MG/1
500 TABLET ORAL EVERY 12 HOURS SCHEDULED
Qty: 10 TABLET | Refills: 0 | Status: SHIPPED | OUTPATIENT
Start: 2023-03-24 | End: 2023-03-29

## 2023-03-24 NOTE — TELEPHONE ENCOUNTER
Pt called back,she does not have a covid tests at home and is unable to get them   She states she does not have covid

## 2023-03-24 NOTE — TELEPHONE ENCOUNTER
Received a voicemail from patient with questions fir Dr Lydia Chavez  I returned call but had to leave a voicemail  To get more information

## 2023-03-24 NOTE — TELEPHONE ENCOUNTER
Sore throat stared yesterday  Would you be willing to prescribe something? She has not been in to see you regarding this

## 2023-03-27 NOTE — TELEPHONE ENCOUNTER
Talked with patient she said she didn't  the medication but she is still not feel well, I advised patient to  prescription and then call us back when completed if still not feeling well  Patient verbalized understanding

## 2023-03-29 ENCOUNTER — TELEPHONE (OUTPATIENT)
Dept: NEPHROLOGY | Facility: CLINIC | Age: 63
End: 2023-03-29

## 2023-03-29 NOTE — TELEPHONE ENCOUNTER
Called in spoke with patient  Patient aware has CKD 4 and if it resolved we can just monitor if it recurs have her give us a call

## 2023-03-29 NOTE — TELEPHONE ENCOUNTER
Received a voice mail from patient requesting a call back  I returned the call and spoke with patient, patient stated last Thursday or Friday patient noticed that there was blood in her urine  patient stated it was a little bit and it just happened that one time  Patient wants to know what stage she is in with kidney disease  Patient also want to know if she should be doing anything? Please advise

## 2023-03-30 DIAGNOSIS — F41.1 GENERALIZED ANXIETY DISORDER: ICD-10-CM

## 2023-03-30 DIAGNOSIS — F31.9 BIPOLAR 1 DISORDER (HCC): ICD-10-CM

## 2023-03-30 DIAGNOSIS — K59.03 DRUG-INDUCED CONSTIPATION: ICD-10-CM

## 2023-03-30 DIAGNOSIS — F31.9 BIPOLAR AFFECTIVE DISORDER, REMISSION STATUS UNSPECIFIED (HCC): ICD-10-CM

## 2023-03-30 RX ORDER — QUETIAPINE FUMARATE 400 MG/1
TABLET, FILM COATED ORAL
Qty: 90 TABLET | Refills: 0 | Status: CANCELLED | OUTPATIENT
Start: 2023-03-30

## 2023-03-30 RX ORDER — ARIPIPRAZOLE 10 MG/1
TABLET ORAL
Qty: 90 TABLET | Refills: 0 | Status: SHIPPED | OUTPATIENT
Start: 2023-03-30

## 2023-03-30 RX ORDER — QUETIAPINE FUMARATE 300 MG/1
TABLET, FILM COATED ORAL
Qty: 90 TABLET | Refills: 0 | Status: CANCELLED | OUTPATIENT
Start: 2023-03-30

## 2023-03-30 RX ORDER — LAMOTRIGINE 200 MG/1
TABLET ORAL
Qty: 180 TABLET | Refills: 0 | Status: CANCELLED | OUTPATIENT
Start: 2023-03-30

## 2023-03-30 RX ORDER — QUETIAPINE FUMARATE 100 MG/1
TABLET, FILM COATED ORAL
Qty: 90 TABLET | Refills: 0 | Status: CANCELLED | OUTPATIENT
Start: 2023-03-30

## 2023-03-30 NOTE — TELEPHONE ENCOUNTER
Medication Refill Request     1  Name of Medication Ambilify  Dose/Frequency 10mg tabs/ 1 tab per day  Quantity Pt requests 90 tabs  Verified pharmacy   [x]  Verified ordering Provider   [x]  Does patient have enough for the next 3 days? Yes [x] No []  Does patient have a follow-up appointment scheduled? Yes [] No [x]   If so when is appointment: Pt indicated doing great on current regime of rx      2  Name of Medication Lamictal  Dose/Frequency 200mg tabs/ 1 tab in AM  Quantity 180 tabs  Verified pharmacy   [x]  Verified ordering Provider   [x]  Does patient have enough for the next 3 days? Yes [x] No []  Does patient have a follow-up appointment scheduled? Yes [] No [x]   If so when is appointment: Pt indicated doing great on current regime of rx        3  Name of Medication Seroquel  Dose/Frequency 100mg tabs/1 tab at night  Quantity 90 tabs  Verified pharmacy   [x]  Verified ordering Provider   [x]  Does patient have enough for the next 3 days? Yes [x] No []  Does patient have a follow-up appointment scheduled? Yes [] No [x]   If so when is appointment: Pt indicated doing great on current regime of rx      4  Name of Medication Seroquel  Dose/Frequency 400mg tabs/1 tab at night  Quantity 90 tabs  Verified pharmacy   [x]  Verified ordering Provider   [x]  Does patient have enough for the next 3 days? Yes [x] No []  Does patient have a follow-up appointment scheduled? Yes [] No [x]   If so when is appointment: Pt indicated doing great on current regime of rx      5  Name of Medication Seroquel  Dose/Frequency 300mg tabs/1 tab in AM  Quantity 90 tabs  Verified pharmacy   [x]  Verified ordering Provider   [x]  Does patient have enough for the next 3 days? Yes [x] No []  Does patient have a follow-up appointment scheduled?  Yes [] No [x]   If so when is appointment: Pt indicated doing great on current regime of rx

## 2023-04-01 DIAGNOSIS — K59.03 DRUG-INDUCED CONSTIPATION: ICD-10-CM

## 2023-04-01 DIAGNOSIS — K21.9 GASTROESOPHAGEAL REFLUX DISEASE, UNSPECIFIED WHETHER ESOPHAGITIS PRESENT: ICD-10-CM

## 2023-04-01 DIAGNOSIS — F31.81 BIPOLAR 2 DISORDER (HCC): Chronic | ICD-10-CM

## 2023-04-01 DIAGNOSIS — E23.2 DIABETES INSIPIDUS (HCC): ICD-10-CM

## 2023-04-01 DIAGNOSIS — N25.81 SECONDARY RENAL HYPERPARATHYROIDISM (HCC): ICD-10-CM

## 2023-04-01 DIAGNOSIS — F31.9 BIPOLAR 1 DISORDER (HCC): ICD-10-CM

## 2023-04-01 DIAGNOSIS — I10 ESSENTIAL HYPERTENSION: ICD-10-CM

## 2023-04-01 DIAGNOSIS — F42.9 OBSESSIVE-COMPULSIVE DISORDER, UNSPECIFIED TYPE: ICD-10-CM

## 2023-04-01 DIAGNOSIS — E78.00 HYPERCHOLESTEREMIA: ICD-10-CM

## 2023-04-01 DIAGNOSIS — N18.30 CHRONIC KIDNEY DISEASE, STAGE 3 (HCC): ICD-10-CM

## 2023-04-01 DIAGNOSIS — N28.1 RENAL CYST: ICD-10-CM

## 2023-04-01 RX ORDER — OMEPRAZOLE 40 MG/1
CAPSULE, DELAYED RELEASE ORAL
Qty: 90 CAPSULE | Refills: 3 | Status: SHIPPED | OUTPATIENT
Start: 2023-04-01

## 2023-04-03 ENCOUNTER — SOCIAL WORK (OUTPATIENT)
Dept: BEHAVIORAL/MENTAL HEALTH CLINIC | Facility: CLINIC | Age: 63
End: 2023-04-03

## 2023-04-03 DIAGNOSIS — F50.9 EATING DISORDER, UNSPECIFIED TYPE: ICD-10-CM

## 2023-04-03 DIAGNOSIS — F31.4 SEVERE DEPRESSED BIPOLAR I DISORDER WITHOUT PSYCHOTIC FEATURES (HCC): Primary | ICD-10-CM

## 2023-04-03 DIAGNOSIS — F42.2 MIXED OBSESSIONAL THOUGHTS AND ACTS: ICD-10-CM

## 2023-04-03 RX ORDER — ROSUVASTATIN CALCIUM 20 MG/1
TABLET, COATED ORAL
Qty: 90 TABLET | Refills: 3 | Status: SHIPPED | OUTPATIENT
Start: 2023-04-03

## 2023-04-03 RX ORDER — CARVEDILOL 25 MG/1
TABLET ORAL
Qty: 180 TABLET | Refills: 3 | Status: SHIPPED | OUTPATIENT
Start: 2023-04-03

## 2023-04-03 RX ORDER — CLONAZEPAM 0.5 MG/1
TABLET ORAL
Qty: 360 TABLET | Refills: 0 | Status: SHIPPED | OUTPATIENT
Start: 2023-04-03

## 2023-04-03 NOTE — PSYCH
"Behavioral Health Psychotherapy Progress Note    Psychotherapy Provided: Individual Psychotherapy     1  Severe depressed bipolar I disorder without psychotic features ([address] )        2  Mixed obsessional thoughts and acts        3  Eating disorder, unspecified type            Goals addressed in session: Goal 1     DATA:   Did treatment goals in Epic today with client  Raymond Montesinos said she wants to work on her \"OCD\" and \"Anxiety  \"  She said she is battling anxiety presently regarding some psychosis  She said she is working with Dr Salome Ruiz on this presently and is taking Abilify  Raymond Montesinos said when she is driving her car, she \"thinks\" she sees other people around her and thinks she may have hit them with her car  She said this is very stressful for her  We discussed coping skills for stress and anxiety and also some mindfulness  During this session, this clinician used the following therapeutic modalities: Mindfulness-based Strategies    Substance Abuse was not addressed during this session  If the client is diagnosed with a co-occurring substance use disorder, please indicate any changes in the frequency or amount of use: N/A  Stage of change for addressing substance use diagnoses: No substance use/Not applicable    ASSESSMENT:  Teto Bella presents with a Euthymic/ normal and Dysthymic mood  her affect is Normal range and intensity and Flat, which is congruent, with her mood and the content of the session  The client has not made progress on their goals  Teto Bella presents with a none risk of suicide, none risk of self-harm, and none risk of harm to others  For any risk assessment that surpasses a \"low\" rating, a safety plan must be developed  A safety plan was indicated: no  If yes, describe in detail N/A    PLAN: Between sessions, Teto Bella will take walks when she can  She said this helps her and has lost some weight and this helps her feel good about herself   At the next " session, the therapist will use Solution-Focused Therapy , Mindfulness and CBT to address goals/needs/concerns  Behavioral Health Treatment Plan and Discharge Planning: Tamara Parish is aware of and agrees to continue to work on their treatment plan  They have identified and are working toward their discharge goals   yes    Visit start and stop times:    04/03/23  Start Time: 0800  Stop Time: 0848  Total Visit Time: 48 minutes

## 2023-04-03 NOTE — BH TREATMENT PLAN
"[address] [facility]  1960     Date of Initial Psychotherapy Assessment: Unknown   Date of Current Treatment Plan: 04/03/23  Treatment Plan Target Date: 10-3-23  Treatment Plan Expiration Date: 10-3-23    Diagnosis:   1  Severe depressed bipolar I disorder without psychotic features ([address] )        2  Mixed obsessional thoughts and acts        3  Eating disorder, unspecified type            Area(s) of Need: Bipolar, Depression and OCD and Anxiety    Long Term Goal 1 (in the client's own words): I need to be able to control my obsessive compulsive d/o    Stage of Change: Action    Target Date for completion: 10-3-23     Anticipated therapeutic modalities: Mindfulness, motivational interviewing, CBT     People identified to complete this goal: Client      Objective 1: (identify the means of measuring success in meeting the objective): I won't be obsessively spending money         Long Term Goal 2 (in the client's own words):  Better control over my anxiety    Stage of Change: Preparation    Target Date for completion: 10/3/23     Anticipated therapeutic modalities: Mindfulness, CBT     People identified to complete this goal: client      Objective 1: (identify the means of measuring success in meeting the objective): I will not repeatedly checking that I've not hit anyone with my car (\"I would become psychotic and think people were around and that I hit them with my car  \")               I am currently under the care of a [facility] psychiatric provider: yes    My [facility] psychiatric provider is: Dr Christie Hudson    I am currently taking psychiatric medications: Yes, as prescribed    I feel that I will be ready for discharge from mental health care when I reach the following (measurable goal/objective): Goals will be met  For children and adults who have a legal guardian:   Has there been any change to custody orders and/or guardianship status? NA   If yes, " attach updated documentation  I have created my Crisis Plan and have been offered a copy of this plan    [address]: Diagnosis and Treatment Plan explained to [id number] Paulino acknowledges an understanding of their diagnosis  Jessy Gomez agrees to this treatment plan  I have been offered a copy of this Treatment Plan   no

## 2023-04-04 DIAGNOSIS — M79.601 PAIN OF RIGHT UPPER EXTREMITY: ICD-10-CM

## 2023-04-05 RX ORDER — TRAMADOL HYDROCHLORIDE 50 MG/1
50 TABLET ORAL EVERY 8 HOURS PRN
Qty: 60 TABLET | Refills: 0 | Status: SHIPPED | OUTPATIENT
Start: 2023-04-05

## 2023-04-22 ENCOUNTER — APPOINTMENT (EMERGENCY)
Dept: CT IMAGING | Facility: HOSPITAL | Age: 63
End: 2023-04-22

## 2023-04-22 ENCOUNTER — APPOINTMENT (EMERGENCY)
Dept: RADIOLOGY | Facility: HOSPITAL | Age: 63
End: 2023-04-22

## 2023-04-22 ENCOUNTER — HOSPITAL ENCOUNTER (EMERGENCY)
Facility: HOSPITAL | Age: 63
Discharge: HOME | End: 2023-04-22
Attending: EMERGENCY MEDICINE | Admitting: EMERGENCY MEDICINE

## 2023-04-22 VITALS
WEIGHT: 200 LBS | HEIGHT: 67 IN | OXYGEN SATURATION: 95 % | HEART RATE: 66 BPM | RESPIRATION RATE: 20 BRPM | BODY MASS INDEX: 31.39 KG/M2 | DIASTOLIC BLOOD PRESSURE: 60 MMHG | SYSTOLIC BLOOD PRESSURE: 131 MMHG | TEMPERATURE: 98 F

## 2023-04-22 DIAGNOSIS — R93.89 ABNORMAL CT OF THE CHEST: ICD-10-CM

## 2023-04-22 DIAGNOSIS — S30.1XXA CONTUSION OF FLANK, INITIAL ENCOUNTER: Primary | ICD-10-CM
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
## 2023-04-22 LAB
ALBUMIN SERPL BCP-MCNC: 4.1 G/DL (ref 3.5–5)
ALP SERPL-CCNC: 179 U/L (ref 34–104)
ALT SERPL W P-5'-P-CCNC: 12 U/L (ref 7–52)
ANION GAP SERPL CALCULATED.3IONS-SCNC: 6 MMOL/L (ref 4–13)
APAP SERPL-MCNC: <10 UG/ML (ref 10–20)
AST SERPL W P-5'-P-CCNC: 20 U/L (ref 13–39)
BASOPHILS # BLD AUTO: 0.06 THOUSANDS/ΜL (ref 0–0.1)
BASOPHILS NFR BLD AUTO: 1 % (ref 0–1)
BILIRUB SERPL-MCNC: 0.35 MG/DL (ref 0.2–1)
BUN SERPL-MCNC: 28 MG/DL (ref 5–25)
CALCIUM SERPL-MCNC: 9.9 MG/DL (ref 8.4–10.2)
CHLORIDE SERPL-SCNC: 108 MMOL/L (ref 96–108)
CO2 SERPL-SCNC: 24 MMOL/L (ref 21–32)
CREAT SERPL-MCNC: 2.59 MG/DL (ref 0.6–1.3)
EOSINOPHIL # BLD AUTO: 0.28 THOUSAND/ΜL (ref 0–0.61)
EOSINOPHIL NFR BLD AUTO: 5 % (ref 0–6)
ERYTHROCYTE [DISTWIDTH] IN BLOOD BY AUTOMATED COUNT: 12.3 % (ref 11.6–15.1)
ETHANOL SERPL-MCNC: <10 MG/DL
GFR SERPL CREATININE-BSD FRML MDRD: 19 ML/MIN/1.73SQ M
GLUCOSE SERPL-MCNC: 97 MG/DL (ref 65–140)
HCT VFR BLD AUTO: 34.8 % (ref 34.8–46.1)
HGB BLD-MCNC: 10.7 G/DL (ref 11.5–15.4)
IMM GRANULOCYTES # BLD AUTO: 0.01 THOUSAND/UL (ref 0–0.2)
IMM GRANULOCYTES NFR BLD AUTO: 0 % (ref 0–2)
LYMPHOCYTES # BLD AUTO: 1.29 THOUSANDS/ΜL (ref 0.6–4.47)
LYMPHOCYTES NFR BLD AUTO: 23 % (ref 14–44)
MCH RBC QN AUTO: 31.8 PG (ref 26.8–34.3)
MCHC RBC AUTO-ENTMCNC: 30.7 G/DL (ref 31.4–37.4)
MCV RBC AUTO: 104 FL (ref 82–98)
MONOCYTES # BLD AUTO: 0.64 THOUSAND/ΜL (ref 0.17–1.22)
MONOCYTES NFR BLD AUTO: 11 % (ref 4–12)
NEUTROPHILS # BLD AUTO: 3.34 THOUSANDS/ΜL (ref 1.85–7.62)
NEUTS SEG NFR BLD AUTO: 60 % (ref 43–75)
NRBC BLD AUTO-RTO: 0 /100 WBCS
PLATELET # BLD AUTO: 197 THOUSANDS/UL (ref 149–390)
PMV BLD AUTO: 9.7 FL (ref 8.9–12.7)
POTASSIUM SERPL-SCNC: 5.5 MMOL/L (ref 3.5–5.3)
PROT SERPL-MCNC: 7 G/DL (ref 6.4–8.4)
RBC # BLD AUTO: 3.36 MILLION/UL (ref 3.81–5.12)
SALICYLATES SERPL-MCNC: <5 MG/DL (ref 3–20)
SODIUM SERPL-SCNC: 138 MMOL/L (ref 135–147)
WBC # BLD AUTO: 5.62 THOUSAND/UL (ref 4.31–10.16)

## 2023-04-22 RX ORDER — LIDOCAINE 50 MG/G
1 PATCH TOPICAL ONCE
Status: DISCONTINUED | OUTPATIENT
Start: 2023-04-22 | End: 2023-04-22 | Stop reason: HOSPADM

## 2023-04-22 RX ADMIN — LIDOCAINE 1 PATCH: 50 PATCH TOPICAL at 12:49

## 2023-04-22 NOTE — ED PROVIDER NOTES
"Emergency Department Trauma Note  Bard Moraes 63 y o  female MRN: [id number]  Unit/Bed#: ED 08/ED 08 Encounter: [id number]      Trauma Alert: Trauma Acuity: Trauma Evaluation  Model of Arrival: Mode of Arrival: Other (Comment) (Family) via    Trauma Team: Current Providers  Attending Provider: Gregory Calderon MD  Registered Nurse: Deangelo Carrington RN  Consultants:     None      History of Present Illness     Chief Complaint:   Chief Complaint   Patient presents with   • Leg Pain     Pt arrives with c/o of bilateral leg pain that started last Sunday  She states \" it's a pin stabbing and sharp sensation\" Also, reported falling last Sunday and striking head  Denies any blood thinners or LOC  Was not seen for the fall  Rock Bomont again on Wednesday denies head strike or LOC  Has a large bruise on right side of abd  HPI:  Bard Moraes is a 63 y o  female who presents with diffuse pain after 2 falls in the past week  Mechanism:Details of Incident: Pt reports falling on Sunday and striking head  Denies LOC Injury Date: 04/16/23 Injury Time: 0600 Injury Occurence Location - [address] [place] [address] Way: Mishra    63year old female presents for evaluation of bilateral leg pain for the past week which has been worsening over the past 3 days ago after falling for a second time while carrying groceries  Patient states that she initially fell 1 week ago onto her back, striking her right side and hitting the back of her head  She denies LOC  For the past 5 days, she states she has been taking 1500 mg acetaminophen every 3 hours for the pain with the last dose yesterday evening          Review of Systems    Historical Information     Immunizations:   Immunization History   Administered Date(s) Administered   • COVID-19 PFIZER VACCINE 0 3 ML IM 05/05/2021, 05/29/2021, 01/21/2022   • Influenza, recombinant, quadrivalent,injectable, preservative free 01/02/2019, 10/02/2019, 01/03/2022, 10/10/2022   • Pneumococcal " Conjugate 13-Valent 08/14/2019   • Pneumococcal Polysaccharide PPV23 01/31/2022   • Tdap 08/14/2019       Past Medical History:   Diagnosis Date   • Anxiety    • Anxiety disorder    • Arthritis    • At risk for falls    • Bipolar 2 disorder (HCC)    • Chronic back pain    • Chronic kidney disease    • Closed fracture of distal end of right fibula with routine healing 11/04/2020   • COVID-19     in Jan 2021   • CVA (cerebral vascular accident) ([facility] )     noted on MRI in the past   • Depression    • GERD (gastroesophageal reflux disease)    • Hypercholesteremia    • Hypernatremia    • Hypertension    • Hypokalemia    • Idiopathic chronic pancreatitis ([facility] ) 03/20/2018   • Intervertebral disc disorder with radiculopathy of lumbosacral region     resolved: 12/28/2015   • Kidney disease    • Limb alert care status     LUE-fistula   • Panic attacks    • Pericardial effusion    • PONV (postoperative nausea and vomiting)    • Psychiatric problem    • Radiculitis     resolved: 12/28/2015   • Secondary renal hyperparathyroidism ([facility] )    • Stroke ([facility] )    • Vitamin D deficiency        Family History   Problem Relation Age of Onset   • Bipolar disorder Mother    • Mental illness Mother         depression   • Stroke Mother    • Dementia Mother    • Colon polyps Mother    • Heart disease Father    • Hypertension Father    • Diabetes Father    • Other Family         Back disorder   • Diabetes Family    • Heart disease Family    • Hypertension Family    • Stroke Family    • Thyroid disease Family    • Breast cancer Paternal Grandmother         age unknown   • Breast cancer Paternal Aunt         age unknown   • Breast cancer Maternal Aunt         age unknown   • Mental illness Sister    • Colon polyps Sister    • Mental illness Sister    • Heart disease Sister    • No Known Problems Sister    • Breast cancer Sister 76   • Other Son         pituitary tumor   • Hypertension Son    • Obesity Son    • No Known Problems Son    • No Known Problems Maternal Grandmother    • No Known Problems Maternal Grandfather    • No Known Problems Paternal Grandfather    • Breast cancer Paternal Aunt         age unknown   • Substance Abuse Neg Hx         neg fam hx   • Colon cancer Neg Hx      Past Surgical History:   Procedure Laterality Date   • BUNIONECTOMY      Left foot    • CAST APPLICATION Right 5601    Procedure: Application short-arm thumb spica splint;  Surgeon: Citlalli Velez MD;  Location: [facility];  Service: Orthopedics   • COLON SURGERY     • COLONOSCOPY  2021   • DILATION AND CURETTAGE OF UTERUS     • INDUCED       surgically induced   • SC ARTERIOVENOUS ANASTOMOSIS OPEN DIRECT Left 2019    Procedure: CREATION FISTULA ARTERIOVENOUS (AV) left wrists possible left upper;  Surgeon: Lon Halsted, MD;  Location: [facility];  Service: Vascular   • SC CORRJ [address] W/SESMDC W/DIST Marzetta Silvan Left 2019    Procedure: Maryjo Gonzalez;  Surgeon: Fredi Romero DPM;  Location: [facility];  Service: Podiatry   • SC CORRJ [address] W/SESMDC W/DIST Marzetta Silvan Right 8/3/2020    Procedure: Prasanna Kanaris;  Surgeon: Fredi Romero DPM;  Location: [facility];  Service: Podiatry   • SC CORRJ [address] W/SESMDC W/PROX PHLNX OSTEOT Right 2021    Procedure: Everlene Kike, right tameka osteotomy and 2nd claw toe correction;  Surgeon: Valencia Jose MD;  Location: [facility];  Service: Orthopedics   • SC ERCP DX COLLECTION SPECIMEN BRUSHING/WASHING N/A 2018    Procedure: ENDOSCOPIC RETROGRADE CHOLANGIOPANCREATOGRAPHY (ERCP);   Surgeon: Karishma Appiah MD;  Location: [facility];  Service: Gastroenterology   • SC LAPAROSCOPY PROCTOPEXY PROLAPSE N/A 2018    Procedure: ROBOTIC SIGMOID RESECTION / RECTOPEXY;  Surgeon: Meg Segal MD;  Location: [facility];  Service: Colorectal   • SC OPEN TREATMENT RADIAL SHAFT FRACTURE Right 10/11/2021    Procedure: OPEN REDUCTION W/ INTERNAL FIXATION (ORIF) RADIUS (WRIST), RIGHT DISTAL;  Surgeon: Rianna Cisneros MD;  Location: [facility];  Service: Orthopedics   • TX REMOVAL IMPLANT DEEP Right 2022    Procedure: Removal of hardware volar aspect right distal radius (distal radial plate and screws); Surgeon: Silvia Hurt MD;  Location: [facility];  Service: Orthopedics   • TX SIGMOIDOSCOPY FLX DX W/COLLJ SPEC BR/WA IF PFRMD N/A 2018    Procedure: SIGMOIDOSCOPY FLEXIBLE;  Surgeon: Boni Green MD;  [facility];  Service: Colorectal   • TX TR TDN RESTORE INTRNSC FUNCJ ALL 4 FNGRS Right 2022    Procedure: Right ring finger flexor digitorum superficialis to flexor pollicis longus tendon transfer;  Surgeon: Silvia Hurt MD;  Location: [facility];  Service: Orthopedics   • TUBAL LIGATION Bilateral    • [address] THYROID BIOPSY  2019     Social History     Tobacco Use   • Smoking status: Never   • Smokeless tobacco: Never   Vaping Use   • Vaping Use: Never used   Substance Use Topics   • Alcohol use: Never   • Drug use: Not Currently     E-Cigarette/Vaping   • E-Cigarette Use Never User      E-Cigarette/Vaping Substances   • Nicotine No    • THC No    • CBD No    • Flavoring No    • Other No    • Unknown No        Family History: non-contributory    Meds/Allergies   Prior to Admission Medications   Prescriptions Last Dose Informant Patient Reported? Taking?    AMILoride 5 mg tablet   No No   Sig: Take 1 tablet (5 mg total) by mouth 2 (two) times a day   ARIPiprazole (ABILIFY) 10 mg tablet   No No   Sig: Aripiprazole 10 mg : 1 tablet po daily   QUEtiapine (SEROquel) 100 mg tablet   No No   Sig: Quetiapine 100mg : 1 tablet + 400mg po at night   QUEtiapine (SEROquel) 300 mg tablet   No No   Sig: Quetiapine 300m tablet po  in morning   QUEtiapine (SEROquel) 400 MG tablet   No No   Sig: Quetiapine 400m tablet + 100mg tablet po at night   acetaminophen (TYLENOL) 650 mg CR tablet   No No   Sig: Take 1 tablet (650 mg total) by mouth every 8 (eight) hours as needed for mild pain   albuterol (Ventolin HFA) 90 mcg/act inhaler   No No   Sig: Inhale 2 puffs every 6 (six) hours as needed for wheezing or shortness of breath   amLODIPine (NORVASC) 5 mg tablet   No No   Sig: Take 1 tablet (5 mg total) by mouth daily   aspirin 81 mg chewable tablet   No No   Sig: Chew 1 tablet (81 mg total) daily   carvedilol (COREG) 25 mg tablet   No No   Sig: TAKE 1 TABLET BY MOUTH  TWICE DAILY WITH MEALS   cholecalciferol (VITAMIN D3) 1,000 units tablet   No No   Sig: Take 5 tablets (5,000 Units total) by mouth daily   clonazePAM (KlonoPIN) 0 5 mg tablet   No No   Sig: TAKE 1 TABLET BY MOUTH 4 TIMES  DAILY   fluvoxaMINE (LUVOX) 100 mg tablet   No No   Sig: Fluvoxamine 100m tablet in the morning ; 2 tablets at night   lamoTRIgine (LaMICtal) 200 MG tablet   No No   Sig: Lamotrigine 200m tablet po in the morning + 1 tablet po at night   methocarbamol (ROBAXIN) 500 mg tablet   No No   Sig: Take 1 tablet (500 mg total) by mouth 3 (three) times a day   omeprazole (PriLOSEC) 40 MG capsule   No No   Sig: TAKE 1 CAPSULE BY MOUTH  DAILY BEFORE BREAKFAST   ondansetron (ZOFRAN) 4 mg tablet   No No   Sig: Take 1 tablet (4 mg total) by mouth every 8 (eight) hours as needed for nausea or vomiting   rosuvastatin (CRESTOR) 20 MG tablet   No No   Sig: TAKE 1 TABLET BY MOUTH IN  THE EVENING   traMADol (Ultram) 50 mg tablet   No No   Sig: Take 1 tablet (50 mg total) by mouth every 8 (eight) hours as needed for severe pain      Facility-Administered Medications: None       Allergies   Allergen Reactions   • Pollen Extract Nasal Congestion       PHYSICAL EXAM    PE limited by: none    Objective   Vitals:   First set: Temperature: 98 8 °F (37 1 °C) (23 1054)  Pulse: 70 (23 1054)  Respirations: 18 (23 1054)  Blood Pressure: 154/70 (23 1054)  SpO2: 97 % (23 1054)    Primary Survey:   (A) Airway: patent  (B) Breathing: bilateral breath sounds  (C) Circulation: Pulses:   normal  (D) Disabliity:  GCS Total:  15  (E) Expose:  Completed    Secondary Survey: (Click on Physical Exam tab above)  Physical Exam  Vitals and nursing note reviewed  HENT:      Head: Normocephalic and atraumatic  Eyes:      Conjunctiva/sclera: Conjunctivae normal    Cardiovascular:      Rate and Rhythm: Normal rate and regular rhythm  Pulses: Normal pulses  Heart sounds: Normal heart sounds  Pulmonary:      Effort: Pulmonary effort is normal  No respiratory distress  Breath sounds: Normal breath sounds  Chest:      Chest wall: Tenderness present  No crepitus  Abdominal:      General: There is no distension  Palpations: Abdomen is soft  Tenderness: There is generalized abdominal tenderness  There is no guarding or rebound  Musculoskeletal:      Comments: Tenderness along the entirety of the body including C/T/L spine tenderness  No step offs or deformities  Skin:     General: Skin is warm and dry  Neurological:      General: No focal deficit present  Mental Status: She is alert  Cervical spine cleared by clinical criteria?  No (imaging required)      Invasive Devices     Peripheral Intravenous Line  Duration           Peripheral IV 04/22/23 Right;Dorsal (posterior) Hand <1 day          Line  Duration           Hemodialysis AV Fistula 11/18/19 Left  1251 days                Lab Results:   Results Reviewed     Procedure Component Value Units Date/Time    Salicylate level [[id number]]  (Normal) Collected: 04/22/23 1121    Lab Status: Final result Specimen: Blood from Arm, Right Updated: 39/71/42 6114     Salicylate Lvl <5 mg/dL     Comprehensive metabolic panel [[id number]]  (Abnormal) Collected: 04/22/23 1121    Lab Status: Final result Specimen: Blood from Hand, Right Updated: 04/22/23 1144     Sodium 138 mmol/L      Potassium 5 5 mmol/L      Chloride 108 mmol/L      CO2 24 mmol/L      ANION GAP 6 mmol/L      BUN 28 mg/dL Creatinine 2 59 mg/dL      Glucose 97 mg/dL      Calcium 9 9 mg/dL      AST 20 U/L      ALT 12 U/L      Alkaline Phosphatase 179 U/L      Total Protein 7 0 g/dL      Albumin 4 1 g/dL      Total Bilirubin 0 35 mg/dL      eGFR 19 ml/min/1 73sq m     Narrative:      [place] guidelines for Chronic Kidney Disease (CKD):   •  Stage 1 with normal or high GFR (GFR > 90 mL/min/1 73 square meters)  •  Stage 2 Mild CKD (GFR = 60-89 mL/min/1 73 square meters)  •  Stage 3A Moderate CKD (GFR = 45-59 mL/min/1 73 square meters)  •  Stage 3B Moderate CKD (GFR = 30-44 mL/min/1 73 square meters)  •  Stage 4 Severe CKD (GFR = 15-29 mL/min/1 73 square meters)  •  Stage 5 End Stage CKD (GFR <15 mL/min/1 73 square meters)  Note: GFR calculation is accurate only with a steady state creatinine    Ethanol [[id number]]  (Normal) Collected: 04/22/23 1121    Lab Status: Final result Specimen: Blood from Arm, Right Updated: 04/22/23 1142     Ethanol Lvl <10 mg/dL     Acetaminophen level-If concentration is detectable, please discuss with medical  on call   [[id number]]  (Abnormal) Collected: 04/22/23 1121    Lab Status: Final result Specimen: Blood from Arm, Right Updated: 04/22/23 1142     Acetaminophen Level <10 ug/mL     CBC and differential [[id number]]  (Abnormal) Collected: 04/22/23 1121    Lab Status: Final result Specimen: Blood from Hand, Right Updated: 04/22/23 1128     WBC 5 62 Thousand/uL      RBC 3 36 Million/uL      Hemoglobin 10 7 g/dL      Hematocrit 34 8 %       fL      MCH 31 8 pg      MCHC 30 7 g/dL      RDW 12 3 %      MPV 9 7 fL      Platelets 578 Thousands/uL      nRBC 0 /100 WBCs      Neutrophils Relative 60 %      Immat GRANS % 0 %      Lymphocytes Relative 23 %      Monocytes Relative 11 %      Eosinophils Relative 5 %      Basophils Relative 1 %      Neutrophils Absolute 3 34 Thousands/µL      Immature Grans Absolute 0 01 Thousand/uL      Lymphocytes Absolute 1 29 Thousands/µL Monocytes Absolute 0 64 Thousand/µL      Eosinophils Absolute 0 28 Thousand/µL      Basophils Absolute 0 06 Thousands/µL                  Imaging Studies:   Direct to CT: No  TRAUMA - CT head wo contrast   Final Result by RUSS Finley MD (04/22 1205)      No acute intracranial abnormality  Stable chronic lacunar infarct in the left basal ganglia  Chronic microangiopathy  Workstation performed: [id number]         TRAUMA - CT spine cervical wo contrast   Final Result by RUSS Finley MD (04/22 1205)      No cervical spine fracture or traumatic malalignment  Workstation performed: [id number]         TRAUMA - CT chest abdomen pelvis wo contrast   Final Result by Jen Spencer MD (04/22 1232)         1  Right flank soft tissue contusion  2   Bilateral groundglass opacities with differential diagnosis including infection, inflammation or underlying chronic interstitial lung disease  Follow-up evaluation with CT thorax should be obtained in 3 months interval    3   Persistent multiple areas of high density possibly ingested tablets at the level of the cecum  The study was marked in [facility] for immediate notification  Workstation performed: [id number]         XR Trauma chest portable   ED Interpretation by Hilario Ely MD (04/22 1133)   No acute pulmonary pathology  No displaced rib fractures  No hemo/pneumothorax  Poor inspiratory film      Final Result by Jen Spencer MD (04/22 1233)      Interstitial opacities and low lung volumes  No pneumothorax  Workstation performed: [id number]         XR Trauma pelvis ap only 1 or 2 vw   ED Interpretation by Hilario Ely MD (04/22 1133)   No acute fractures or dislocations      Final Result by Jen Spencer MD (04/22 1233)      No acute osseous abnormality        Workstation performed: [id number] Procedures  Procedures         ED Course  ED Course as of 04/22/23 1256   Sat Apr 22, 2023   1159 Creatinine(!): 2 59  Stable CKD, 2 73 two months ago           Medical Decision Making  63year old female presents for evaluation of diffuse pain after 2 falls in the past week  Right flank contusion on exam   Diffuse tenderness  Reported head strike on aspirin 1 week ago  Trauma evaluation initiated  CTH and Cspine without acute traumatic pathology  CT c/a/p with right flank contusion noted on exam   Patient informed of incidental findings  PCP follow up  Return precautions provided  Amount and/or Complexity of Data Reviewed  Labs: ordered  Decision-making details documented in ED Course  Radiology: ordered and independent interpretation performed  Risk  Prescription drug management  Disposition  [facility] Transfer: No  Final diagnoses:   Contusion of right flank, initial encounter   Fall, initial encounter   Abnormal CT of the chest     Time reflects when diagnosis was documented in both MDM as applicable and the Disposition within this note     Time User Action Codes Description Comment    4/22/2023 12:52 PM Ariella Maddox Add [S30 1XXA] Contusion of flank, initial encounter     4/22/2023 12:52 PM Ariella Maddox Modify [S30 1XXA] Contusion of right flank, initial encounter     4/22/2023 12:52 PM Jim Marcus Add [O89  VGNY] Fall, initial encounter     4/22/2023 12:53 PM Tim SCHOFIELD Add [R93 89] Abnormal CT of the chest       ED Disposition     ED Disposition   Discharge    Condition   Stable    Date/Time   Sat Apr 22, 2023 12:52 PM    Cristiano Mejía Sender [facility] discharge to home/self care                 Follow-up Information     Follow up With Specialties Details Why Contact Info Additional [address],  Internal Medicine Schedule an appointment as soon as possible for a visit in 1 week for re-evaluation and to arrange for follow up CT chest in 3 months [facility] Emergency Department Emergency Medicine Go to  If symptoms worsen [address] [place],[address] [address]  [address] [facility] Emergency Department, [address] [facility], [facility] [address]        Patient's Medications   Discharge Prescriptions    No medications on file     No discharge procedures on file      PDMP Review       Value Time User    PDMP Reviewed  Yes 4/5/2023  1:03 PM Per Yu DO          ED Provider  Electronically Signed by         Ashley Martinez MD  04/22/23 7728

## 2023-04-22 NOTE — DISCHARGE INSTRUCTIONS
Take 1000 mg of acetaminophen (Tylenol) every 6-8 hours as needed for pain  Lidocaine patches are available over-the-counter can be found in the back pain aisle of most pharmacies  Look for 4% lidocaine in the list of the active ingredients  These patches can be placed for 12 hours  After 12 hours, discard the patch  The next patch can be placed 12 hours later

## 2023-04-24 ENCOUNTER — VBI (OUTPATIENT)
Dept: FAMILY MEDICINE CLINIC | Facility: HOSPITAL | Age: 63
End: 2023-04-24

## 2023-04-24 DIAGNOSIS — K59.03 DRUG-INDUCED CONSTIPATION: ICD-10-CM

## 2023-04-24 DIAGNOSIS — F31.9 BIPOLAR 1 DISORDER (HCC): ICD-10-CM

## 2023-04-24 NOTE — TELEPHONE ENCOUNTER
Dr Keanu Guadalupe Patient  Last Seen 2/6/23  Pt states she is fine with the dosage and doesn't have any concerns besides not having any refills  Medication Refill Requests (4)    1  Name of Medication Lamictal  Dose/Frequency 200mg/1tab po in am  Quantity 180 tab  Verified pharmacy   [x]  Verified ordering Provider   [x]  Does patient have enough for the next 3 days? Yes [] No [x]  Does patient have a follow-up appointment scheduled? Yes [] No [x]   If so when is appointment:   Dr Keanu Guadalupe Patient  Last Seen 2/6/23    2  Name of Medication Seroquel  Dose/Frequency 100mg/1tab po @night  Quantity 90 tab  Verified pharmacy   [x]  Verified ordering Provider   [x]  Does patient have enough for the next 3 days? Yes [] No [x]  Does patient have a follow-up appointment scheduled? Yes [] No [x]   If so when is appointment:   Dr Keanu Guadalupe Patient  Last Seen 2/6/23    3  Name of Medication Seroquel  Dose/Frequency 300mg/1tab po in am  Quantity 90 tab  Verified pharmacy   [x]  Verified ordering Provider   [x]  Does patient have enough for the next 3 days? Yes [] No [x]  Does patient have a follow-up appointment scheduled? Yes [] No [x]   If so when is appointment:   Dr Keanu Guadalupe Patient  Last Seen 2/6/23    4  Name of Medication Seroquel  Dose/Frequency 400mg/1tab po @night  Quantity 90 tab  Verified pharmacy   [x]  Verified ordering Provider   [x]  Does patient have enough for the next 3 days? Yes [] No [x]  Does patient have a follow-up appointment scheduled?  Yes [] No [x]   If so when is appointment:   Dr Keanu Guadalupe Patient  Last Seen 2/6/23

## 2023-04-24 NOTE — TELEPHONE ENCOUNTER
Ann Bowels    ED Visit Information     Ed visit date: 4/22/23  Diagnosis Description: Leg/Flank pain  In Network? Yes [facility] L Cha McKenzie  Discharge status: Home  Discharged with meds ? No  Number of ED visits to date: 4  ED Severity:3     Outreach Information    Outreach successful: No 2  Date letter mailed:4/25/23  Date Finalized:4/25/23    Care Coordination    Follow up appointment with pcp: no no  Transportation issues ?  NA    Value Base Outreach    Outreach type: 7 Day Outreach  04/24/2023 02:19 PM EDT by Elise Dave 04/24/2023 02:19 PM EDT by Ely Randle (Self) [phone] (OQNYEW)[phone] (Mobile)  [phone] (Mobile) Remove  - Left Message    04/25/2023 10:50 AM EDT by Elise Dave 04/25/2023 10:50 AM EDT by Ely Randle (Self) [phone] (Mobile)  [phone] (Mobile) Remove  - Left Message

## 2023-04-24 NOTE — LETTER
[address] [facility] PRIMARY CARE [address] [address]    Date: 04/25/23  [address] [facility] [address]    Dear Sondra Carrington: Thank you for choosing [facility] emergency department for care  As your primary care provider we want to make sure that your ongoing medical care is being addressed  If you require follow up care as a result of your emergency department visit, there are a few things we would like you to know  As part of our continuing commitment to caring for our patient, we have added more same day appointments and have extended our office hours to meet your medical needs  After hours, on-call physicians are available via our main office line  We encourage you to contact our office prior to seeking treatment to discuss your symptoms with our medical staff  Together, we can determine the correct course of action  A majority of non-emergent conditions such as: common cold, flu-like symptoms, fevers, strains/sprains, dislocations, minor burns, cuts and animal bites can be treated at [address] [place] [address] facilities  Diagnostic testing is available at some sites  Of course, if you are experiencing a life threatening medical emergency call 911 or proceed directly to the nearest emergency room  Your nearest [facility] facility is conveniently located at:    [facility]  [address] [place] [address], [address] [place] [address] Road  [phone]  SKIP THE WAIT  Conveniently offered at most [facility] locations  Cynthiafort your spot online at www [email or web address] or on the [facility]    Sincerely,    Markus Jhaveri  Dept: [phone]

## 2023-04-25 RX ORDER — QUETIAPINE FUMARATE 300 MG/1
TABLET, FILM COATED ORAL
Qty: 90 TABLET | Refills: 0 | Status: SHIPPED | OUTPATIENT
Start: 2023-04-25

## 2023-04-25 RX ORDER — QUETIAPINE FUMARATE 100 MG/1
TABLET, FILM COATED ORAL
Qty: 90 TABLET | Refills: 0 | Status: SHIPPED | OUTPATIENT
Start: 2023-04-25

## 2023-04-25 RX ORDER — LAMOTRIGINE 200 MG/1
TABLET ORAL
Qty: 180 TABLET | Refills: 0 | Status: SHIPPED | OUTPATIENT
Start: 2023-04-25

## 2023-04-25 RX ORDER — QUETIAPINE FUMARATE 400 MG/1
TABLET, FILM COATED ORAL
Qty: 90 TABLET | Refills: 0 | Status: SHIPPED | OUTPATIENT
Start: 2023-04-25

## 2023-04-27 ENCOUNTER — OFFICE VISIT (OUTPATIENT)
Dept: FAMILY MEDICINE CLINIC | Facility: HOSPITAL | Age: 63
End: 2023-04-27

## 2023-04-27 VITALS
SYSTOLIC BLOOD PRESSURE: 152 MMHG | WEIGHT: 215.6 LBS | HEIGHT: 67 IN | OXYGEN SATURATION: 93 % | HEART RATE: 74 BPM | BODY MASS INDEX: 33.84 KG/M2 | DIASTOLIC BLOOD PRESSURE: 80 MMHG

## 2023-04-27 DIAGNOSIS — J45.40 MODERATE PERSISTENT ASTHMA WITHOUT COMPLICATION: ICD-10-CM

## 2023-04-27 DIAGNOSIS — J45.909 UNCOMPLICATED ASTHMA, UNSPECIFIED ASTHMA SEVERITY, UNSPECIFIED WHETHER PERSISTENT: ICD-10-CM

## 2023-04-27 DIAGNOSIS — M76.892 LEFT KNEE TENDONITIS: Primary | ICD-10-CM

## 2023-04-27 DIAGNOSIS — K42.9 UMBILICAL HERNIA WITHOUT OBSTRUCTION AND WITHOUT GANGRENE: ICD-10-CM

## 2023-04-27 DIAGNOSIS — N18.5 CKD (CHRONIC KIDNEY DISEASE) STAGE 5, GFR LESS THAN 15 ML/MIN (HCC): ICD-10-CM

## 2023-04-27 DIAGNOSIS — Z12.11 SCREEN FOR COLON CANCER: ICD-10-CM

## 2023-04-27 RX ORDER — BUDESONIDE AND FORMOTEROL FUMARATE DIHYDRATE 160; 4.5 UG/1; UG/1
2 AEROSOL RESPIRATORY (INHALATION) 2 TIMES DAILY
Qty: 10.2 G | Refills: 3 | Status: SHIPPED | OUTPATIENT
Start: 2023-04-27

## 2023-04-27 RX ORDER — ALBUTEROL SULFATE 90 UG/1
2 AEROSOL, METERED RESPIRATORY (INHALATION) EVERY 6 HOURS PRN
Qty: 8.5 G | Refills: 3 | Status: SHIPPED | OUTPATIENT
Start: 2023-04-27

## 2023-04-27 NOTE — ASSESSMENT & PLAN NOTE
Roula Shabnam  On 4/ 10- fell back [place] and hit head at a picnic and then fell on 4/19 bringing groceries into house and fell onto left side - went to Ed on 4/22 due to pain on left  flank with large bruise-  And then has had  increased pain in left knee since then   Has to get up slowly when gettign up from chair

## 2023-04-27 NOTE — PROGRESS NOTES
Assessment/Plan:     Diagnosis ICD-10-CM Associated Orders   1  Left knee tendonitis  M76 892 Ambulatory referral to Orthopedic Surgery     XR knee 4+ vw left injury      2  Moderate persistent asthma without complication  R73 11 albuterol (Ventolin HFA) 90 mcg/act inhaler      3  Umbilical hernia without obstruction and without gangrene  K42 9 Ambulatory referral to General Surgery      4  Uncomplicated asthma, unspecified asthma severity, unspecified whether persistent  J45 909 budesonide-formoterol (Symbicort) 160-4 5 mcg/act inhaler      5  CKD (chronic kidney disease) stage 5, GFR less than 15 ml/min ([facility])  N18 5           Problem List Items Addressed This Visit        Respiratory    Moderate persistent asthma without complication    Relevant Medications    albuterol (Ventolin HFA) 90 mcg/act inhaler    budesonide-formoterol (Symbicort) 160-4 5 mcg/act inhaler       Musculoskeletal and Integument    Left knee tendonitis - Primary     Fell  On 4/ 10- fell back [place] and hit head at a picnic and then fell on 4/19 bringing groceries into house and fell onto left side - went to Ed on 4/22 due to pain on left  flank with large bruise-  And then has had  increased pain in left knee since then  Has to get up slowly when gettign up from chair         Relevant Orders    Ambulatory referral to Orthopedic Surgery    XR knee 4+ vw left injury       Other    Umbilical hernia      noted on ct in ED 7/78 - to have umbilical hernia- has occasional  Left flank pain   will refer to Dr Yovanny Pearce referral to General Surgery   Other Visit Diagnoses     Uncomplicated asthma, unspecified asthma severity, unspecified whether persistent        Relevant Medications    albuterol (Ventolin HFA) 90 mcg/act inhaler    budesonide-formoterol (Symbicort) 160-4 5 mcg/act inhaler    CKD (chronic kidney disease) stage 5, GFR less than 15 ml/min ([facility])                No follow-ups on file        Subjective: Patient ID: Linn Marsh is a 63 y o  female    Here for post ed evaluation   1  Injuries and ed visit follouwp -had 2 falls and then in ed with right  bruising and also with left knee pain- has difficulty getting out of chair  Encouraged to use walker she has t at home  2  worried about confusion- unsure what time of day and week it was- I am concerned if meds are causing issues- off abilify but felt too foggy- now on seroquel and luvox- sees [facility]   is on klonazepam 4x day- will decrease to 3x day  3  Asthma - has seen dR gill           The following portions of the patient's history were reviewed and updated as appropriate: allergies, current medications and problem list      Review of Systems   Respiratory: Negative for shortness of breath and wheezing  Has had some chest tightness with pollen days   Skin: Positive for rash          Under breasts bialterally         Objective:      Current Outpatient Medications:   •  acetaminophen (TYLENOL) 650 mg CR tablet, Take 1 tablet (650 mg total) by mouth every 8 (eight) hours as needed for mild pain, Disp: 30 tablet, Rfl: 0  •  albuterol (Ventolin HFA) 90 mcg/act inhaler, Inhale 2 puffs every 6 (six) hours as needed for wheezing or shortness of breath, Disp: 8 5 g, Rfl: 3  •  AMILoride 5 mg tablet, Take 1 tablet (5 mg total) by mouth 2 (two) times a day, Disp: 180 tablet, Rfl: 3  •  amLODIPine (NORVASC) 5 mg tablet, Take 1 tablet (5 mg total) by mouth daily, Disp: 90 tablet, Rfl: 3  •  aspirin 81 mg chewable tablet, Chew 1 tablet (81 mg total) daily, Disp: , Rfl:   •  budesonide-formoterol (Symbicort) 160-4 5 mcg/act inhaler, Inhale 2 puffs 2 (two) times a day Rinse mouth after use , Disp: 10 2 g, Rfl: 3  •  carvedilol (COREG) 25 mg tablet, TAKE 1 TABLET BY MOUTH  TWICE DAILY WITH MEALS, Disp: 180 tablet, Rfl: 3  •  cholecalciferol (VITAMIN D3) 1,000 units tablet, Take 5 tablets (5,000 Units total) by mouth daily, Disp: 90 tablet, Rfl: "5  •  clonazePAM (KlonoPIN) 0 5 mg tablet, TAKE 1 TABLET BY MOUTH 4 TIMES  DAILY, Disp: 360 tablet, Rfl: 0  •  fluvoxaMINE (LUVOX) 100 mg tablet, Fluvoxamine 100m tablet in the morning ; 2 tablets at night, Disp: 270 tablet, Rfl: 0  •  lamoTRIgine (LaMICtal) 200 MG tablet, Lamotrigine 200m tablet po in the morning + 1 tablet po at night, Disp: 180 tablet, Rfl: 0  •  omeprazole (PriLOSEC) 40 MG capsule, TAKE 1 CAPSULE BY MOUTH  DAILY BEFORE BREAKFAST, Disp: 90 capsule, Rfl: 3  •  ondansetron (ZOFRAN) 4 mg tablet, Take 1 tablet (4 mg total) by mouth every 8 (eight) hours as needed for nausea or vomiting, Disp: 60 tablet, Rfl: 1  •  QUEtiapine (SEROquel) 100 mg tablet, Quetiapine 100mg : 1 tablet + 400mg po at night, Disp: 90 tablet, Rfl: 0  •  QUEtiapine (SEROquel) 300 mg tablet, Quetiapine 300m tablet po  in morning, Disp: 90 tablet, Rfl: 0  •  QUEtiapine (SEROquel) 400 MG tablet, Quetiapine 400m tablet + 100mg tablet po at night, Disp: 90 tablet, Rfl: 0  •  rosuvastatin (CRESTOR) 20 MG tablet, TAKE 1 TABLET BY MOUTH IN  THE EVENING, Disp: 90 tablet, Rfl: 3  •  traMADol (Ultram) 50 mg tablet, Take 1 tablet (50 mg total) by mouth every 8 (eight) hours as needed for severe pain, Disp: 60 tablet, Rfl: 0  •  ARIPiprazole (ABILIFY) 10 mg tablet, Aripiprazole 10 mg : 1 tablet po daily (Patient not taking: Reported on 2023), Disp: 90 tablet, Rfl: 0  •  methocarbamol (ROBAXIN) 500 mg tablet, Take 1 tablet (500 mg total) by mouth 3 (three) times a day (Patient not taking: Reported on 2023), Disp: 90 tablet, Rfl: 0    Blood pressure 152/80, pulse 74, height 5' 7\" (1 702 m), weight 97 8 kg (215 lb 9 6 oz), SpO2 93 %, not currently breastfeeding  Physical Exam  Vitals and nursing note reviewed  Constitutional:       General: She is not in acute distress  HENT:      Head: Normocephalic and atraumatic  Left Ear: Tympanic membrane normal       Mouth/Throat:      Mouth: Mucous membranes are dry   " Cardiovascular:      Rate and Rhythm: Normal rate and regular rhythm  Heart sounds: Murmur heard  Pulmonary:      Breath sounds: Wheezing present  Comments: Minimal end exp wheese  Abdominal:      Palpations: Abdomen is soft  Musculoskeletal:         General: No tenderness  Skin:     Findings: No erythema  Neurological:      Mental Status: She is alert  Psychiatric:         Mood and Affect: Mood normal          Thought Content:  Thought content normal          Judgment: Judgment normal

## 2023-04-27 NOTE — ASSESSMENT & PLAN NOTE
noted on ct in ED 2/76 - to have umbilical hernia- has occasional  Left flank pain   will refer to Dr Chad Roche

## 2023-04-27 NOTE — ASSESSMENT & PLAN NOTE
Lab Results   Component Value Date    EGFR 19 04/22/2023    EGFR 18 02/20/2023    EGFR 21 01/17/2023    CREATININE 2 59 (H) 04/22/2023    CREATININE 2 73 (H) 02/20/2023    CREATININE 2 35 (H) 01/17/2023   Follows with Dr Tomeka Campuzano

## 2023-04-27 NOTE — PATIENT INSTRUCTIONS
Do knee xray today   Call for appt with ortho- Dr Tej Yanez  Call to see surgery- dr Maria L Sales   Call to see Dr Jabier Ramirez about breathing issue

## 2023-04-28 ENCOUNTER — HOSPITAL ENCOUNTER (OUTPATIENT)
Dept: RADIOLOGY | Facility: HOSPITAL | Age: 63
Discharge: HOME | End: 2023-04-28
Attending: INTERNAL MEDICINE

## 2023-04-28 DIAGNOSIS — M76.892 LEFT KNEE TENDONITIS: ICD-10-CM

## 2023-04-28 DIAGNOSIS — M79.601 PAIN OF RIGHT UPPER EXTREMITY: ICD-10-CM

## 2023-05-01 ENCOUNTER — TELEPHONE (OUTPATIENT)
Dept: BEHAVIORAL/MENTAL HEALTH CLINIC | Facility: CLINIC | Age: 63
End: 2023-05-01

## 2023-05-01 RX ORDER — TRAMADOL HYDROCHLORIDE 50 MG/1
TABLET ORAL
Qty: 60 TABLET | Refills: 0 | Status: SHIPPED | OUTPATIENT
Start: 2023-05-01

## 2023-05-01 NOTE — TELEPHONE ENCOUNTER
Left msg for client a few minutes after her appt was scheduled for today    She did not show for her appointment today and is marked 'no show '

## 2023-05-02 ENCOUNTER — SOCIAL WORK (OUTPATIENT)
Dept: BEHAVIORAL/MENTAL HEALTH CLINIC | Facility: CLINIC | Age: 63
End: 2023-05-02

## 2023-05-02 DIAGNOSIS — F50.9 EATING DISORDER, UNSPECIFIED TYPE: ICD-10-CM

## 2023-05-02 DIAGNOSIS — F42.2 MIXED OBSESSIONAL THOUGHTS AND ACTS: ICD-10-CM

## 2023-05-02 DIAGNOSIS — F31.4 SEVERE DEPRESSED BIPOLAR I DISORDER WITHOUT PSYCHOTIC FEATURES (HCC): Primary | ICD-10-CM

## 2023-05-02 NOTE — PSYCH
"Behavioral Health Psychotherapy Progress Note    Psychotherapy Provided: Individual Psychotherapy     1  Severe depressed bipolar I disorder without psychotic features ([address] )        2  Mixed obsessional thoughts and acts        3  Eating disorder, unspecified type            Goals addressed in session: Goal 1     DATA:  Did Crisis Plan today with Colin Guy  She said she stopped taking the Abilify because she felt \"like a zombie  \"  She said she takes 3 Klonopin a day  Client was asking if Dr Yennifer Sosa would be returning to [facility] to which this clinician told client I don't know or when she's coming back  Colin Guy said her medications are being refilled so she is okay  Colin Guy talked today about her on-going living situation with her sister and her niece  She feels she has to [facility] on eggshells\" saying \"my niece reports to my sister every little thing I do not matter how trivial \"  Colin Guy said she took herself off the housing list when her sister told her she could stay and now Colin Guy is second-guessing herself  During this session, this clinician used the following therapeutic modalities:     Substance Abuse was not addressed during this session  If the client is diagnosed with a co-occurring substance use disorder, please indicate any changes in the frequency or amount of use: N/A  Stage of change for addressing substance use diagnoses: No substance use/Not applicable    ASSESSMENT:  Magnus Christine presents with a Euthymic/ normal mood  her affect is Normal range and intensity, which is congruent, with her mood and the content of the session  The client has not made progress on their goals mainly due to a lot of on-going medical issues for her and her living situation  Magnus Christine presents with a none risk of suicide, none risk of self-harm, and none risk of harm to others  For any risk assessment that surpasses a \"low\" rating, a safety plan must be developed      A safety plan was indicated: " no  If yes, describe in detail N/A    PLAN: Between sessions, Jorge Sánchez will try to think of things she can do to make things better in the household  At the next session, the therapist will use Solution-Focused Therapy to address goals/needs/concerns  Behavioral Health Treatment Plan and Discharge Planning: Jorge Sánchez is aware of and agrees to continue to work on their treatment plan  They have identified and are working toward their discharge goals   yes    Visit start and stop times:    05/02/23  Start Time: 1100  Stop Time: 1150  Total Visit Time: 50 minutes

## 2023-05-02 NOTE — BH CRISIS PLAN
"Client Name: Surinder Fernandez       Client YOB: 1960  : 1960    Treatment Team (include name and contact information):     Psychotherapist: Morgan Lopez LCSW    Psychiatrist: Dr Kita Mora of information completed: yes    \" N/A   Release of information completed: no    Other (Specify Role):     Release of information completed: no    Other (Specify Role):    Release of information completed: no    Healthcare Provider  Brie Clemons, [address]  [phone]    Type of Plan   * Child plans (children 15 yo and younger) must be completed and signed by the child's legal guardian   * Plans for all individuals 15 yo and above must be signed by the client  Plan Type: adolescent/adult (15 and over) Initial      My Personal Strengths are (in the client's own words):  [facility], caring and kid    The stressors and triggers that may put me at risk are:  other (describe) Paranoia, sons, living situation, finances    Coping skills I can use to keep myself calm and safe: Other (describe) Look up things on the computer and being with my dog  Coping skills/supports I can use to maintain abstinence from substance use:   N/A    The people that provide me with help and support: (Include name, contact, and how they can help)   Support person #1: Sister Karina Lopez    * Phone number: Programmed in phone    * How can they help me? Vent to her and she gives positive outlook for me   Support person #2:N/A    * Phone number:     * How can they help me? Support person #3:     * Phone number:     * How can they help me?      In the past, the following has helped me in times of crisis:    Other: Medication       If it is an emergency and you need immediate help, call     If there is a possibility of danger to yourself or others, call the following crisis hotline resources:     Adult Crisis Numbers  Suicide Prevention Hotline - Dial " 9-8-8  [facility]: [address]: [address]: [phone]  [facility]: [phone]  [facility]: [address]: [address] [place] [address] St: [phone] (daytime)  [phone] (after hours, weekends, holidays)     Child/Adolescent Crisis Numbers   [facility] [place]: [address]: [phone]   [facility]: [phone]   [facility]: [phone]    Please note: Some counties do not have a separate number for Child/Adolescent specific crisis  If your county is not listed under Child/Adolescent, please call the adult number for your county     National Talk to Text Line   All Ages - [phone]    In the event your feelings become unmanageable, and you cannot reach your support system, you will call 911 immediately or go to the nearest hospital emergency room

## 2023-05-08 ENCOUNTER — TELEPHONE (OUTPATIENT)
Dept: FAMILY MEDICINE CLINIC | Facility: HOSPITAL | Age: 63
End: 2023-05-08

## 2023-05-08 NOTE — TELEPHONE ENCOUNTER
Janel Rondon said she is still having terrible knee pain - dr Simin Ellis asked her to give the office call if it wasn't feeling better      Also asking for medication for the pain    [facility] [place]

## 2023-05-09 DIAGNOSIS — M25.561 PAIN IN BOTH KNEES, UNSPECIFIED CHRONICITY: Primary | ICD-10-CM

## 2023-05-09 DIAGNOSIS — M25.562 PAIN IN BOTH KNEES, UNSPECIFIED CHRONICITY: Primary | ICD-10-CM

## 2023-05-11 ENCOUNTER — CONSULT (OUTPATIENT)
Dept: SURGERY | Facility: HOSPITAL | Age: 63
End: 2023-05-11
Payer: MEDICARE

## 2023-05-11 VITALS
RESPIRATION RATE: 16 BRPM | WEIGHT: 221.2 LBS | DIASTOLIC BLOOD PRESSURE: 83 MMHG | HEART RATE: 77 BPM | TEMPERATURE: 98 F | HEIGHT: 67 IN | SYSTOLIC BLOOD PRESSURE: 132 MMHG | BODY MASS INDEX: 34.72 KG/M2

## 2023-05-11 DIAGNOSIS — K42.9 UMBILICAL HERNIA WITHOUT OBSTRUCTION AND WITHOUT GANGRENE: ICD-10-CM

## 2023-05-11 PROCEDURE — 99203 OFFICE O/P NEW LOW 30 MIN: CPT | Performed by: SURGERY

## 2023-05-12 ENCOUNTER — OFFICE VISIT (OUTPATIENT)
Dept: OBGYN CLINIC | Facility: CLINIC | Age: 63
End: 2023-05-12

## 2023-05-12 VITALS
HEART RATE: 71 BPM | DIASTOLIC BLOOD PRESSURE: 67 MMHG | SYSTOLIC BLOOD PRESSURE: 142 MMHG | WEIGHT: 221 LBS | BODY MASS INDEX: 34.69 KG/M2 | HEIGHT: 67 IN

## 2023-05-12 DIAGNOSIS — M54.16 LUMBAR RADICULOPATHY: Primary | ICD-10-CM

## 2023-05-12 DIAGNOSIS — M17.12 PRIMARY OSTEOARTHRITIS OF LEFT KNEE: ICD-10-CM

## 2023-05-12 RX ORDER — METHYLPREDNISOLONE 4 MG/1
TABLET ORAL
Qty: 21 TABLET | Refills: 0 | Status: SHIPPED | OUTPATIENT
Start: 2023-05-12

## 2023-05-12 NOTE — PROGRESS NOTES
Knee New Office Note    Assessment:     1  Primary osteoarthritis of left knee    2  Lumbar radiculopathy        Plan:     Problem List Items Addressed This Visit        Musculoskeletal and Integument    Left knee tendonitis - Primary   Other Visit Diagnoses     Lumbar radiculopathy        Relevant Orders    Ambulatory Referral to Physical Therapy          Findings today are consistent with left knee osteoarthritis with lumbar radiculopathy  Imaging and prognosis was reviewed with the patient today  Discussed that she has several areas of inflammation causing her pain and discomfort  Medrol dose adina ordered  Referral to physical therapy provided  Follow up as needed  Subjective:     Patient ID: Miya Goff is a 63 y o  female  Chief Complaint:  HPI:  63 y o  female presents to the office for evaluation of left knee pain worsening over the past 2 weeks due to 2 falls  She was seen in the ED following one of her falls  She is experiencing anterior and posterior left knee pain made worse with activities, especially transitional activities  She describes her posterior knee pain as sharp and is worse with flexion activities  She has previously received cortisone injections to bilateral knees and bilateral pes anserine bursae  She does note radiating pain to the left anterior tibia  She denies any numbness or tingling      Allergy:  Allergies   Allergen Reactions   • Pollen Extract Nasal Congestion     Medications:  all current active meds have been reviewed  Past Medical History:  Past Medical History:   Diagnosis Date   • Anxiety    • Anxiety disorder    • Arthritis    • At risk for falls    • Bipolar 2 disorder ([facility] [address] )    • Chronic back pain    • Chronic kidney disease    • Closed fracture of distal end of right fibula with routine healing 11/04/2020   • COVID-19     in Jan 2021   • CVA (cerebral vascular accident) [facility])     noted on MRI in the past   • Depression    • GERD (gastroesophageal reflux disease) • Hypercholesteremia    • Hypernatremia    • Hypertension    • Hypokalemia    • Idiopathic chronic pancreatitis ([facility] [address] ) 2018   • Intervertebral disc disorder with radiculopathy of lumbosacral region     resolved: 2015   • Kidney disease    • Limb alert care status     LUE-fistula   • Panic attacks    • Pericardial effusion    • PONV (postoperative nausea and vomiting)    • Psychiatric problem    • Radiculitis     resolved: 2015   • Secondary renal hyperparathyroidism ([facility] [address] )    • Stroke [facility])    • Vitamin D deficiency      Past Surgical History:  Past Surgical History:   Procedure Laterality Date   • BUNIONECTOMY      Left foot    • CAST APPLICATION Right     Procedure: Application short-arm thumb spica splint;  Surgeon: Erlinda Michel MD;  Location: [facility];  Service: Orthopedics   • COLON SURGERY     • COLONOSCOPY  2021   • DILATION AND CURETTAGE OF UTERUS     • INDUCED       surgically induced   • RI ARTERIOVENOUS ANASTOMOSIS OPEN DIRECT Left 2019    Procedure: CREATION FISTULA ARTERIOVENOUS (AV) left wrists possible left upper;  Surgeon: Sonia Malloy MD;  Location: [facility];  Service: Vascular   • RI [place] W/SESMDC W/DIST Elyn Altaf Left 2019    Procedure: Serenity Crawford;  Surgeon: Karla Stark DPM;  Location: [facility];  Service: Podiatry   • RI CORRJ [address] W/SESMDC W/DIST Elyn Altaf Right 8/3/2020    Procedure: Griselda Mcdermott;  Surgeon: Karla Stark DPM;  Location: [facility];  Service: Podiatry   • RI CORRJ H[place] VALGUS W/SESMDC W/PROX PHLNX OSTEOT Right 2021    Procedure: Goyo Boswell, right tameka osteotomy and 2nd claw toe correction;  Surgeon: Hui Harper MD;  Location: [facility];  Service: Orthopedics   • RI ERCP DX COLLECTION SPECIMEN BRUSHING/WASHING N/A 2018    Procedure: ENDOSCOPIC RETROGRADE CHOLANGIOPANCREATOGRAPHY (ERCP);   Surgeon: Roderick Joel MD;  Location: [facility]; Service: Gastroenterology   • TX LAPAROSCOPY PROCTOPEXY PROLAPSE N/A 7/13/2018    Procedure: ROBOTIC SIGMOID RESECTION / RECTOPEXY;  Surgeon: Verdon Goldberg, MD;  Location: [facility];  Service: Colorectal   • TX OPEN TREATMENT RADIAL SHAFT FRACTURE Right 10/11/2021    Procedure: OPEN REDUCTION W/ INTERNAL FIXATION (ORIF) RADIUS (WRIST), RIGHT DISTAL;  Surgeon: Héctor Reyes MD;  Location: [facility];  Service: Orthopedics   • TX REMOVAL IMPLANT DEEP Right 6/23/2022    Procedure: Removal of hardware volar aspect right distal radius (distal radial plate and screws);   Surgeon: Alverto Castillo MD;  Location: [facility];  Service: Orthopedics   • TX SIGMOIDOSCOPY FLX DX W/COLLJ SPEC BR/WA IF PFRMD N/A 7/13/2018    Procedure: Maureen Malone;  Surgeon: Verdon Goldberg, MD;  [facility];  Service: Colorectal   • TX TR TDN RESTORE INTRNSC FUNCJ ALL 4 FNGRS Right 6/23/2022    Procedure: Right ring finger flexor digitorum superficialis to flexor pollicis longus tendon transfer;  Surgeon: Alverto Castillo MD;  Location: [facility];  Service: Orthopedics   • TUBAL LIGATION Bilateral 1997   • Donnie [address]34 THYROID BIOPSY  7/30/2019     Family History:  Family History   Problem Relation Age of Onset   • Bipolar disorder Mother    • Mental illness Mother         depression   • Stroke Mother    • Dementia Mother    • Colon polyps Mother    • Heart disease Father    • Hypertension Father    • Diabetes Father    • Other Family         Back disorder   • Diabetes Family    • Heart disease Family    • Hypertension Family    • Stroke Family    • Thyroid disease Family    • Breast cancer Paternal Grandmother         age unknown   • Breast cancer Paternal Aunt         age unknown   • Breast cancer Maternal Aunt         age unknown   • Mental illness Sister    • Colon polyps Sister    • Mental illness Sister    • Heart disease Sister    • No Known Problems Sister    • Breast cancer Sister 76   • Other Son pituitary tumor   • Hypertension Son    • Obesity Son    • No Known Problems Son    • No Known Problems Maternal Grandmother    • No Known Problems Maternal Grandfather    • No Known Problems Paternal Grandfather    • Breast cancer Paternal Aunt         age unknown   • Substance Abuse Neg Hx         neg fam hx   • Colon cancer Neg Hx      Social History:  Social History     Substance and Sexual Activity   Alcohol Use Never     Social History     Substance and Sexual Activity   Drug Use Not Currently     Social History     Tobacco Use   Smoking Status Never   Smokeless Tobacco Never           ROS:  General: Per HPI  Skin: Negative, except if noted below  HEENT: Negative  Respiratory: Negative  Cardiovascular: Negative  Gastrointestinal: Negative  Urinary: Negative  Vascular: Negative  Musculoskeletal: Positive per HPI   Neurologic: Positive per HPI  Endocrine: Negative    Objective:  BP Readings from Last 1 Encounters:   05/12/23 142/67      Wt Readings from Last 1 Encounters:   05/12/23 100 kg (221 lb)        Respiratory:   non-labored respirations    Lymphatics:  no palpable lymph nodes    Gait:   antalgic    Neurologic:   Alert and oriented times 3  Patient with normal sensation except as noted below  Deep tendon reflexes 2+ except as noted in MSK exam    Bilateral Lower Extremity:  Left Knee: Inspection:  Skin intact    Overall limb alignment neutral    Effusion: none    ROM full with pain    Extensor Lag: none    Palpation: pes anserine bursa tenderness to palpation    AP Stability at 90 deg stable    M/L stability in full extension stable    M/L stability in midflexion stable    Motor: 5/5 IP/Q/HS/TA/GS    Pulses: 2+ DP / 2+ PT    SILT DP/SP/S/S/TN    Right knee: Inspection:  Skin intact    Overall limb alignment neutral    Effusion: mild    ROM 0-105 with pain    Extensor Lag: none    Palpation: medial, lateral, and posterior Joint line tenderness to palpation  Pes anserine bursa tenderness      AP "Stability at 90 deg stable    M/L stability in full extension stable    M/L stability in midflexion stable    Negative straight leg riase    Motor: 5/5 IP/Q/HS/TA/GS    Pulses: 2+ DP / 2+ PT    SILT DP/SP/S/S/TN    Imaging:  My interpretation XR AP scanogram/AP bilateral knee/lateral/mosley/sunrise left knee: mild joint space narrowing, subchondral sclerosis, subchondral cysts, osteophyte formation  No fracture or dislocation  BMI:   Estimated body mass index is 34 61 kg/m² as calculated from the following:    Height as of this encounter: 5' 7\" (1 702 m)  Weight as of this encounter: 100 kg (221 lb)  BSA:   Estimated body surface area is 2 11 meters squared as calculated from the following:    Height as of this encounter: 5' 7\" (1 702 m)  Weight as of this encounter: 100 kg (221 lb)  Scribe Attestation    I,:  Selene Cormier am acting as a scribe while in the presence of the attending physician :       I,:  Leatha Gaviria DO personally performed the services described in this documentation    as scribed in my presence  :           "

## 2023-05-15 DIAGNOSIS — M20.5X1 CLAW TOE, ACQUIRED, RIGHT: ICD-10-CM

## 2023-05-15 DIAGNOSIS — S99.921A INJURY OF PLANTAR PLATE, RIGHT, INITIAL ENCOUNTER: ICD-10-CM

## 2023-05-15 DIAGNOSIS — M20.11 ACQUIRED HALLUX INTERPHALANGEUS, RIGHT: ICD-10-CM

## 2023-05-15 RX ORDER — ONDANSETRON 4 MG/1
4 TABLET, FILM COATED ORAL EVERY 8 HOURS PRN
Qty: 60 TABLET | Refills: 1 | Status: SHIPPED | OUTPATIENT
Start: 2023-05-15

## 2023-05-17 DIAGNOSIS — L20.82 FLEXURAL ECZEMA: Primary | ICD-10-CM

## 2023-05-17 RX ORDER — FLUOCINONIDE 0.5 MG/G
OINTMENT TOPICAL 2 TIMES DAILY
Qty: 60 G | Refills: 1 | Status: SHIPPED | OUTPATIENT
Start: 2023-05-17

## 2023-05-23 ENCOUNTER — TELEPHONE (OUTPATIENT)
Dept: OBGYN CLINIC | Facility: CLINIC | Age: 63
End: 2023-05-23

## 2023-05-23 DIAGNOSIS — M54.16 LUMBAR RADICULOPATHY: ICD-10-CM

## 2023-05-23 RX ORDER — METHYLPREDNISOLONE 4 MG/1
TABLET ORAL
Qty: 21 TABLET | Refills: 0 | Status: SHIPPED | OUTPATIENT
Start: 2023-05-23 | End: 2023-06-01 | Stop reason: SDUPTHER

## 2023-05-23 NOTE — TELEPHONE ENCOUNTER
Repeat medrol dose pack sent to pharmacy today. If still pain after this, we recommend she follow up with spine and pain. If her pain is only localized to the knee, we can see her back for further treatment of her knee.

## 2023-05-23 NOTE — TELEPHONE ENCOUNTER
Caller: Tyrone Dixon     Doctor: Aditya Jackson    Reason for call:Finished the steroid and feels that when she was taking it her pain was better since completing medications pain is returning.      Call back#: [phone]

## 2023-05-31 ENCOUNTER — EVALUATION (OUTPATIENT)
Dept: PHYSICAL THERAPY | Facility: CLINIC | Age: 63
End: 2023-05-31

## 2023-05-31 DIAGNOSIS — M17.12 PRIMARY OSTEOARTHRITIS OF LEFT KNEE: Primary | ICD-10-CM

## 2023-05-31 DIAGNOSIS — M54.16 LUMBAR RADICULOPATHY: ICD-10-CM

## 2023-05-31 RX ORDER — ARIPIPRAZOLE 10 MG/1
TABLET ORAL
Qty: 30 TABLET | Refills: 11 | OUTPATIENT
Start: 2023-05-31

## 2023-05-31 NOTE — PROGRESS NOTES
PT Evaluation     Today's date: 2023  Patient name: Rodríguez Gomez  : 1960  MRN: [id number]  Referring provider: Yvan Diallo, *  Dx:   Encounter Diagnosis     ICD-10-CM    1  Primary osteoarthritis of left knee  M17 12 Ambulatory Referral to Physical Therapy      2  Lumbar radiculopathy  M54 16 Ambulatory Referral to Physical Therapy                     Assessment  Assessment details: Rodríguez Gomez is a 63 y o  female presenting to outpatient physical therapy at [facility] with complaints of L knee pain, stiffness and weakness   She presents with decreased L knee range of motion, decreased strength, limited flexibility, poor postural awareness, poor body mechanics, altered gait pattern, poor balance, decreased tolerance to activity and decreased functional mobility due to Primary osteoarthritis of left knee  Lumbar radiculopathy   She would benefit from skilled PT services in order to address these deficits and reach maximum level of function   Thank you for the referral!    Impairments: abnormal or restricted ROM, activity intolerance, impaired balance, impaired physical strength, lacks appropriate home exercise program and pain with function    Symptom irritability: moderateUnderstanding of Dx/Px/POC: good   Prognosis: good    Goals  STG  1  Independent with HEP in 2 weeks  2  Decrease pain at worst by 50% in 2 weeks    LTG  1  Increase LLE strength in all planes to 5/5 in 4 weeks  2   Return to full, unrestricted stair & step negotiation in 4 weeks    Plan  Patient would benefit from: skilled physical therapy  Planned modality interventions: thermotherapy: hydrocollator packs  Planned therapy interventions: manual therapy, neuromuscular re-education, therapeutic activities, therapeutic exercise and home exercise program        Subjective Evaluation    History of Present Illness  Date of onset: 3/31/2023  Mechanism of injury: Pt reports chronic history of L knee pain and weakness, "worse with steps  Pt states that prednisone helps but that she is on her last dose today  Quality of life: good    Pain  Current pain ratin  At best pain ratin  At worst pain rating: 10  Location: posteriormedial L knee  Quality: sharp      Diagnostic Tests  X-ray: abnormal (Moderate osteoarthritis with narrowing of the medial tibiofemoral joint and small osteophytes seen )  Treatments  Previous treatment: injection treatment and medication  Patient Goals  Patient goals for therapy: decreased pain and increased strength  Patient goal: painfree stair & step negotiation         Objective     Active Range of Motion   Left Knee   Flexion: 115 degrees with pain  Extension: 5 degrees with pain    Right Knee   Normal active range of motion    Strength/Myotome Testing     Left Knee   Flexion: 4-  Extension: 4-  Quadriceps contraction: fair    Right Knee   Normal strength    Tests     Left Knee   Negative anterior drawer, lateral Kateryna, medial Kateryna, posterior drawer, posterior Lachman, Thessaly's test at 5 degrees and Thessaly's test at 20 degrees                EPOC: 8/10/23      Manuals             L Knee PROM             Gentle L tibiofemoral mobs                                       Neuro Re-Ed             L quad set             H HS set             SLR iso             SAQ iso             LAQ iso x20 5\"                                      Ther Ex             HR             TR             Slant board                                                                 L Knee TERT with heat Into EXT 5'            Ther Activity             LE Bike for improved L knee ROM             Pt education: pathoanatomy, nature of sxs, POC, HEP 15'            Gait Training                                       Modalities                                          "

## 2023-06-01 DIAGNOSIS — M79.601 PAIN OF RIGHT UPPER EXTREMITY: ICD-10-CM

## 2023-06-01 RX ORDER — METHYLPREDNISOLONE 4 MG/1
TABLET ORAL
Qty: 21 TABLET | Refills: 0 | Status: SHIPPED | OUTPATIENT
Start: 2023-06-01 | End: 2023-07-06 | Stop reason: ALTCHOICE

## 2023-06-01 RX ORDER — TRAMADOL HYDROCHLORIDE 50 MG/1
50 TABLET ORAL EVERY 8 HOURS PRN
Qty: 60 TABLET | Refills: 0 | OUTPATIENT
Start: 2023-06-01 | End: 2023-06-03

## 2023-06-01 NOTE — TELEPHONE ENCOUNTER
Caller: Patient    Doctor: Alecia Abernathy    Reason for call: patient is calling in asking if she can have another round of the Medrol dose pack. Please advise.      Pharmacy: [facility] in [facility]    Call back#: [phone]

## 2023-06-02 ENCOUNTER — APPOINTMENT (OUTPATIENT)
Dept: RADIOLOGY | Facility: HOSPITAL | Age: 63
End: 2023-06-02
Payer: MEDICARE

## 2023-06-02 PROCEDURE — 99283 EMERGENCY DEPT VISIT LOW MDM: CPT

## 2023-06-03 ENCOUNTER — HOSPITAL ENCOUNTER (EMERGENCY)
Facility: HOSPITAL | Age: 63
Discharge: HOME | End: 2023-06-03
Attending: EMERGENCY MEDICINE | Admitting: EMERGENCY MEDICINE
Payer: MEDICARE

## 2023-06-03 ENCOUNTER — APPOINTMENT (OUTPATIENT)
Dept: RADIOLOGY | Facility: HOSPITAL | Age: 63
End: 2023-06-03
Payer: MEDICARE

## 2023-06-03 VITALS
OXYGEN SATURATION: 97 % | WEIGHT: 200 LBS | HEART RATE: 88 BPM | SYSTOLIC BLOOD PRESSURE: 148 MMHG | DIASTOLIC BLOOD PRESSURE: 73 MMHG | BODY MASS INDEX: 31.32 KG/M2 | TEMPERATURE: 98 F | RESPIRATION RATE: 18 BRPM

## 2023-06-03 DIAGNOSIS — S80.12XA: Primary | ICD-10-CM
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
## 2023-06-03 DIAGNOSIS — S80.02XA: Primary | ICD-10-CM

## 2023-06-03 DIAGNOSIS — S80.211A ABRASION, RIGHT KNEE, INITIAL ENCOUNTER: ICD-10-CM

## 2023-06-03 PROCEDURE — 73590 X-RAY EXAM OF LOWER LEG: CPT

## 2023-06-03 RX ORDER — TRAMADOL HYDROCHLORIDE 50 MG/1
50 TABLET ORAL 2 TIMES DAILY PRN
Qty: 6 TABLET | Refills: 0 | Status: SHIPPED | OUTPATIENT
Start: 2023-06-03 | End: 2023-06-08

## 2023-06-03 RX ORDER — TRAMADOL HYDROCHLORIDE 50 MG/1
50 TABLET ORAL ONCE
Status: COMPLETED | OUTPATIENT
Start: 2023-06-03 | End: 2023-06-03

## 2023-06-03 RX ADMIN — TRAMADOL HYDROCHLORIDE 50 MG: 50 TABLET, COATED ORAL at 02:23

## 2023-06-03 NOTE — ED PROVIDER NOTES
History  Chief Complaint   Patient presents with   • Leg Injury     Stated she tripped on the curb and fell to her knees, denies head strike  C/O B/L leg pain left leg is ankle pain  Right knee has a large abrasion  Did take tylenol 1 hr ago       64 yo female who was carrying her dog and tripped over curb landing on both knees - abrasion to R knee but L knee pain where she landed  Good ROM - NV intact distally b/l  Xray performed L knee and I do not see anything acute - aware she will get a call if radiologist does  R knee cleansed and bandage applied  Tetanus UTD  History provided by:  Patient   used: No    Leg Pain  Location:  Knee  Injury: yes    Mechanism of injury: fall    Fall:     Fall occurred:  Tripped    Impact surface:  [place]    Point of impact:  Knees    Entrapped after fall: no    Knee location:  L knee and R knee  Pain details:     Quality:  Aching    Severity:  Moderate    Onset quality:  Sudden    Timing:  Constant    Progression:  Worsening  Chronicity:  New  Dislocation: no    Foreign body present:  No foreign bodies  Tetanus status:  Up to date  Prior injury to area:  No  Relieved by:  Nothing  Exacerbated by: palpation  Ineffective treatments:  None tried  Associated symptoms: no back pain, no fever and no neck pain    Associated symptoms comment:  Abrasion R knee, L knee contusion/swelling      Prior to Admission Medications   Prescriptions Last Dose Informant Patient Reported? Taking?    AMILoride 5 mg tablet  Self No No   Sig: Take 1 tablet (5 mg total) by mouth 2 (two) times a day   ARIPiprazole (ABILIFY) 10 mg tablet   No No   Sig: Aripiprazole 10 mg : 1 tablet po daily   Patient not taking: Reported on 2023   QUEtiapine (SEROquel) 100 mg tablet   No No   Sig: Quetiapine 100mg : 1 tablet + 400mg po at night   QUEtiapine (SEROquel) 300 mg tablet   No No   Sig: Quetiapine 300m tablet po  in morning   QUEtiapine (SEROquel) 400 MG tablet   No No   Sig: Quetiapine 400m tablet + 100mg tablet po at night   acetaminophen (TYLENOL) 650 mg CR tablet  Self No No   Sig: Take 1 tablet (650 mg total) by mouth every 8 (eight) hours as needed for mild pain   albuterol (Ventolin HFA) 90 mcg/act inhaler   No No   Sig: Inhale 2 puffs every 6 (six) hours as needed for wheezing or shortness of breath   amLODIPine (NORVASC) 5 mg tablet  Self No No   Sig: Take 1 tablet (5 mg total) by mouth daily   aspirin 81 mg chewable tablet  Self No No   Sig: Chew 1 tablet (81 mg total) daily   budesonide-formoterol (Symbicort) 160-4 5 mcg/act inhaler   No No   Sig: Inhale 2 puffs 2 (two) times a day Rinse mouth after use     carvedilol (COREG) 25 mg tablet   No No   Sig: TAKE 1 TABLET BY MOUTH  TWICE DAILY WITH MEALS   cholecalciferol (VITAMIN D3) 1,000 units tablet  Self No No   Sig: Take 5 tablets (5,000 Units total) by mouth daily   clonazePAM (KlonoPIN) 0 5 mg tablet   No No   Sig: TAKE 1 TABLET BY MOUTH 4 TIMES  DAILY   fluocinonide (LIDEX) 0 05 % ointment   No No   Sig: Apply topically 2 (two) times a day   fluvoxaMINE (LUVOX) 100 mg tablet  Self No No   Sig: Fluvoxamine 100m tablet in the morning ; 2 tablets at night   lamoTRIgine (LaMICtal) 200 MG tablet   No No   Sig: Lamotrigine 200m tablet po in the morning + 1 tablet po at night   methocarbamol (ROBAXIN) 500 mg tablet  Self No No   Sig: Take 1 tablet (500 mg total) by mouth 3 (three) times a day   Patient not taking: Reported on 2023   methylPREDNISolone 4 MG tablet therapy pack   No No   Sig: Use as directed on package   omeprazole (PriLOSEC) 40 MG capsule   No No   Sig: TAKE 1 CAPSULE BY MOUTH  DAILY BEFORE BREAKFAST   ondansetron (ZOFRAN) 4 mg tablet   No No   Sig: Take 1 tablet (4 mg total) by mouth every 8 (eight) hours as needed for nausea or vomiting   rosuvastatin (CRESTOR) 20 MG tablet   No No   Sig: TAKE 1 TABLET BY MOUTH IN  THE EVENING   traMADol (ULTRAM) 50 mg tablet   No No   Sig: Take 1 tablet (50 mg total) by mouth every 8 (eight) hours as needed for severe pain      Facility-Administered Medications: None       Past Medical History:   Diagnosis Date   • Anxiety    • Anxiety disorder    • Arthritis    • At risk for falls    • Bipolar 2 disorder (HCC)    • Chronic back pain    • Chronic kidney disease    • Closed fracture of distal end of right fibula with routine healing 2020   • COVID-19     in 2021   • CVA (cerebral vascular accident) [facility])     noted on MRI in the past   • Depression    • GERD (gastroesophageal reflux disease)    • Hypercholesteremia    • Hypernatremia    • Hypertension    • Hypokalemia    • Idiopathic chronic pancreatitis ([facility] [address] ) 2018   • Intervertebral disc disorder with radiculopathy of lumbosacral region     resolved: 2015   • Kidney disease    • Limb alert care status     LUE-fistula   • Panic attacks    • Pericardial effusion    • PONV (postoperative nausea and vomiting)    • Psychiatric problem    • Radiculitis     resolved: 2015   • Secondary renal hyperparathyroidism ([facility] )    • Stroke [facility])    • Vitamin D deficiency        Past Surgical History:   Procedure Laterality Date   • BUNIONECTOMY      Left foot    • CAST APPLICATION Right     Procedure: Application short-arm thumb spica splint;  Surgeon: Reagan Joyner MD;  Location: [facility];  Service: Orthopedics   • COLON SURGERY     • COLONOSCOPY  2021   • DILATION AND CURETTAGE OF UTERUS     • INDUCED       surgically induced   • CA ARTERIOVENOUS ANASTOMOSIS OPEN DIRECT Left 2019    Procedure: CREATION FISTULA ARTERIOVENOUS (AV) left wrists possible left upper;  Surgeon: Andres Walls MD;  [facility]: [facility];  Service: Vascular   • CA CORRJ HALLUX VALGUS W/SESMDC W/DIST Donna Alan Left 2019    Procedure: Tae Saenz;  Surgeon: Iwona Washington DPM;  [facility];  Service: Podiatry   • CA CORRJ HALLUX VALGUS W/SESMDC W/DIST Donna Alan Right 8/3/2020 Procedure: Debra Sella;  Surgeon: Iwona Washington DPM;  [facility]: [facility];  Service: Podiatry   • VA CORRJ HALLUX VALGUS W/SESMDC W/PROX PHLNX OSTEOT Right 9/27/2021    Procedure: Angelina Salas, right tameka osteotomy and 2nd claw toe correction;  Surgeon: Mariely Andrade MD;  [facility];  Service: Orthopedics   • VA ERCP DX COLLECTION SPECIMEN BRUSHING/WASHING N/A 4/11/2018    Procedure: ENDOSCOPIC RETROGRADE CHOLANGIOPANCREATOGRAPHY (ERCP); Surgeon: Giovanni Fair MD;  [facility];  Service: Gastroenterology   • VA LAPAROSCOPY PROCTOPEXY PROLAPSE N/A 7/13/2018    Procedure: ROBOTIC SIGMOID RESECTION / RECTOPEXY;  Surgeon: Rachel Booth MD;  [facility];  Service: Colorectal   • VA OPEN TREATMENT RADIAL SHAFT FRACTURE Right 10/11/2021    Procedure: OPEN REDUCTION W/ INTERNAL FIXATION (ORIF) RADIUS (WRIST), RIGHT DISTAL;  Surgeon: Hilario Fried MD;  [facility]: [facility];  Service: Orthopedics   • VA REMOVAL IMPLANT DEEP Right 6/23/2022    Procedure: Removal of hardware volar aspect right distal radius (distal radial plate and screws);   Surgeon: Reagan Joyner MD;  [facility];  Service: Orthopedics   • VA SIGMOIDOSCOPY FLX DX W/COLLJ SPEC BR/WA IF PFRMD N/A 7/13/2018    Procedure: Gokul Smith;  Surgeon: Rachel Booth MD;  [facility];  Service: Colorectal   • VA TR TDN RESTORE INTRNSC FUNCJ ALL 4 FNGRS Right 6/23/2022    Procedure: Right ring finger flexor digitorum superficialis to flexor pollicis longus tendon transfer;  Surgeon: Reagan Joyner MD;  [facility]: [facility];  Service: Orthopedics   • TUBAL LIGATION Bilateral 1997   • [address] THYROID BIOPSY  7/30/2019       Family History   Problem Relation Age of Onset   • Bipolar disorder Mother    • Mental illness Mother         depression   • Stroke Mother    • Dementia Mother    • Colon polyps Mother    • Heart disease Father    • Hypertension Father    • Diabetes Father • Other Family         Back disorder   • Diabetes Family    • Heart disease Family    • Hypertension Family    • Stroke Family    • Thyroid disease Family    • Breast cancer Paternal Grandmother         age unknown   • Breast cancer Paternal Aunt         age unknown   • Breast cancer Maternal Aunt         age unknown   • Mental illness Sister    • Colon polyps Sister    • Mental illness Sister    • Heart disease Sister    • No Known Problems Sister    • Breast cancer Sister 76   • Other Son         pituitary tumor   • Hypertension Son    • Obesity Son    • No Known Problems Son    • No Known Problems Maternal Grandmother    • No Known Problems Maternal Grandfather    • No Known Problems Paternal Grandfather    • Breast cancer Paternal Aunt         age unknown   • Substance Abuse Neg Hx         neg fam hx   • Colon cancer Neg Hx      I have reviewed and agree with the history as documented  E-Cigarette/Vaping   • E-Cigarette Use Never User      E-Cigarette/Vaping Substances   • Nicotine No    • THC No    • CBD No    • Flavoring No    • Other No    • Unknown No      Social History     Tobacco Use   • Smoking status: Never   • Smokeless tobacco: Never   Vaping Use   • Vaping Use: Never used   Substance Use Topics   • Alcohol use: Never   • Drug use: Not Currently       Review of Systems   Constitutional: Negative for chills and fever  HENT: Negative for ear pain and sore throat  Eyes: Negative for pain and visual disturbance  Respiratory: Negative for cough and shortness of breath  Cardiovascular: Negative for chest pain and palpitations  Gastrointestinal: Negative for abdominal pain and vomiting  Genitourinary: Negative for dysuria and hematuria  Musculoskeletal: Negative for arthralgias, back pain, neck pain and neck stiffness  Abrasion R knee, L knee contusion/swelling   Skin: Negative for color change and rash  Neurological: Negative for seizures and syncope     All other systems reviewed and are negative  Physical Exam  Physical Exam  Vitals and nursing note reviewed  Constitutional:       General: She is not in acute distress  Appearance: She is well-developed  HENT:      Head: Normocephalic and atraumatic  Mouth/Throat:      Mouth: Mucous membranes are moist    Eyes:      Conjunctiva/sclera: Conjunctivae normal    Cardiovascular:      Rate and Rhythm: Normal rate and regular rhythm  Heart sounds: No murmur heard  Pulmonary:      Effort: Pulmonary effort is normal  No respiratory distress  Breath sounds: Normal breath sounds  Abdominal:      Palpations: Abdomen is soft  Tenderness: There is no abdominal tenderness  Musculoskeletal:         General: Tenderness (Abrasion R knee, L knee contusion/swelling - able to ROM both knees, ankles and hips) and signs of injury present  No swelling or deformity  Cervical back: Neck supple  Skin:     General: Skin is warm and dry  Capillary Refill: Capillary refill takes less than 2 seconds  Neurological:      General: No focal deficit present  Mental Status: She is alert and oriented to person, place, and time     Psychiatric:         Mood and Affect: Mood normal          Vital Signs  ED Triage Vitals [06/02/23 2342]   Temperature Pulse Respirations Blood Pressure SpO2   98 °F (36 7 °C) 99 18 158/76 97 %      Temp Source Heart Rate Source Patient Position - Orthostatic VS BP Location FiO2 (%)   Temporal Monitor Sitting Left arm --      Pain Score       8           Vitals:    06/02/23 2342 06/03/23 0215   BP: 158/76 148/73   Pulse: 99 88   Patient Position - Orthostatic VS: Sitting Sitting         Visual Acuity      ED Medications  Medications   traMADol (ULTRAM) tablet 50 mg (50 mg Oral Given 6/3/23 0223)       Diagnostic Studies  Results Reviewed     None                 XR tibia fibula 2 views LEFT    (Results Pending)              Procedures  Procedures         ED Course  ED Course as of 06/03/23 0417   Sat Jun 03, 2023   0201 Pt seen and examined  64 yo female who was carrying her dog and tripped over curb landing on both knees - abrasion to R knee but L knee pain where she landed  Good ROM - NV intact distally b/l  Xray performed L knee and I do not see anything acute - aware she will get a call if radiologist does  R knee cleansed and bandage applied  Tetanus UTD  Took Tylenol, will give dose of Ultram and a few doses to go - unable to take NSAIDs due to CKD  SBIRT 22yo+    Flowsheet Row Most Recent Value   Initial Alcohol Screen: US AUDIT-C     1  How often do you have a drink containing alcohol? 0 Filed at: 06/02/2023 2344   2  How many drinks containing alcohol do you have on a typical day you are drinking? 0 Filed at: 06/02/2023 2344   3a  Male UNDER 65: How often do you have five or more drinks on one occasion? 0 Filed at: 06/02/2023 2344   3b  FEMALE Any Age, or MALE 65+: How often do you have 4 or more drinks on one occassion? 0 Filed at: 06/02/2023 2344   Audit-C Score 0 Filed at: 06/02/2023 2344   ZAYRA: How many times in the past year have you    Used an illegal drug or used a prescription medication for non-medical reasons? Never Filed at: 06/02/2023 2344                    MDM    Disposition  Final diagnoses:   Contusion of left knee and lower leg   Fall from slip, trip, or stumble   Abrasion, right knee, initial encounter     Time reflects when diagnosis was documented in both MDM as applicable and the Disposition within this note     Time User Action Codes Description Comment    6/3/2023  2:16 AM Amelie Hughes Add [S80 02XA,  S80 12XA] Contusion of left knee and lower leg     6/3/2023  2:16 AM Tyrelodilia AMBRIZ Add [W01  0XXA] Fall from slip, trip, or stumble     6/3/2023  2:17 AM Tyrel Harper M Add [S80 211A] Abrasion, right knee, initial encounter       ED Disposition     ED Disposition   Discharge    Condition   Stable    Date/Time   Sat Jean 3, 2023  2:16 AM    Comment   Madi Palmer [facility] discharge to home/self care                 Follow-up Information     Follow up With Specialties Details Why Contact Info Additional [id number],  Internal Medicine Schedule an appointment as soon as possible for a visit  As needed Emilio  [address] [facility] [address]  [address] [facility] Specialists [facility] Orthopedic Surgery Schedule an appointment as soon as possible for a visit  If symptoms worsen Pod [facility] [id number] [facility] [id number]  [address] [place] [address] Specialists [facility], [address] [place] [address], [facility] [address]          Discharge Medication List as of 6/3/2023  2:21 AM      CONTINUE these medications which have CHANGED    Details   traMADol (ULTRAM) 50 mg tablet Take 1 tablet (50 mg total) by mouth 2 (two) times a day as needed for moderate pain for up to 5 days, Starting Sat 6/3/2023, Until Thu 6/8/2023 at 2359, Normal         CONTINUE these medications which have NOT CHANGED    Details   acetaminophen (TYLENOL) 650 mg CR tablet Take 1 tablet (650 mg total) by mouth every 8 (eight) hours as needed for mild pain, Starting Thu 6/23/2022, Normal      albuterol (Ventolin HFA) 90 mcg/act inhaler Inhale 2 puffs every 6 (six) hours as needed for wheezing or shortness of breath, Starting Thu 4/27/2023, Normal      AMILoride 5 mg tablet Take 1 tablet (5 mg total) by mouth 2 (two) times a day, Starting Wed 2/1/2023, Normal      amLODIPine (NORVASC) 5 mg tablet Take 1 tablet (5 mg total) by mouth daily, Starting Mon 2/13/2023, Normal      ARIPiprazole (ABILIFY) 10 mg tablet Aripiprazole 10 mg : 1 tablet po daily, Normal      aspirin 81 mg chewable tablet Chew 1 tablet (81 mg total) daily, Starting Tue 12/28/2021, No Print      budesonide-formoterol (Symbicort) 160-4 5 mcg/act inhaler Inhale 2 puffs 2 (two) times a day Rinse mouth after use , Starting Thu 2023, Normal      carvedilol (COREG) 25 mg tablet TAKE 1 TABLET BY MOUTH  TWICE DAILY WITH MEALS, Normal      cholecalciferol (VITAMIN D3) 1,000 units tablet Take 5 tablets (5,000 Units total) by mouth daily, Starting Mon 2022, Normal      clonazePAM (KlonoPIN) 0 5 mg tablet TAKE 1 TABLET BY MOUTH 4 TIMES  DAILY, Normal      fluocinonide (LIDEX) 0 05 % ointment Apply topically 2 (two) times a day, Starting Wed 2023, Normal      fluvoxaMINE (LUVOX) 100 mg tablet Fluvoxamine 100m tablet in the morning ; 2 tablets at night, Normal      lamoTRIgine (LaMICtal) 200 MG tablet Lamotrigine 200m tablet po in the morning + 1 tablet po at night, Normal      methocarbamol (ROBAXIN) 500 mg tablet Take 1 tablet (500 mg total) by mouth 3 (three) times a day, Starting 2023, Normal      methylPREDNISolone 4 MG tablet therapy pack Use as directed on package, Normal      omeprazole (PriLOSEC) 40 MG capsule TAKE 1 CAPSULE BY MOUTH  DAILY BEFORE BREAKFAST, Normal      ondansetron (ZOFRAN) 4 mg tablet Take 1 tablet (4 mg total) by mouth every 8 (eight) hours as needed for nausea or vomiting, Starting Mon 5/15/2023, Normal      !! QUEtiapine (SEROquel) 100 mg tablet Quetiapine 100mg : 1 tablet + 400mg po at night, Normal      !! QUEtiapine (SEROquel) 300 mg tablet Quetiapine 300m tablet po  in morning, Normal      !! QUEtiapine (SEROquel) 400 MG tablet Quetiapine 400m tablet + 100mg tablet po at night, Normal      rosuvastatin (CRESTOR) 20 MG tablet TAKE 1 TABLET BY MOUTH IN  THE EVENING, Normal       !! - Potential duplicate medications found  Please discuss with provider  No discharge procedures on file      PDMP Review       Value Time User    PDMP Reviewed  Yes 2023  1:00 PM Oracio Hogan DO          ED Provider  Electronically Signed by           Tamara Villanueva DO  [id number]

## 2023-06-03 NOTE — ED NOTES
Abrasion to right knee cleansed with soap and water and dressing applied        Lisseth Moss RN  06/02/23 8796

## 2023-06-05 ENCOUNTER — SOCIAL WORK (OUTPATIENT)
Dept: BEHAVIORAL/MENTAL HEALTH CLINIC | Facility: CLINIC | Age: 63
End: 2023-06-05
Payer: MEDICARE

## 2023-06-05 ENCOUNTER — VBI (OUTPATIENT)
Dept: ADMINISTRATIVE | Facility: OTHER | Age: 63
End: 2023-06-05

## 2023-06-05 DIAGNOSIS — F42.2 MIXED OBSESSIONAL THOUGHTS AND ACTS: Primary | ICD-10-CM

## 2023-06-05 DIAGNOSIS — F31.4 SEVERE DEPRESSED BIPOLAR I DISORDER WITHOUT PSYCHOTIC FEATURES (HCC): ICD-10-CM

## 2023-06-05 PROCEDURE — 90834 PSYTX W PT 45 MINUTES: CPT | Performed by: COUNSELOR

## 2023-06-05 NOTE — TELEPHONE ENCOUNTER
Sims Harada    ED Visit Information     Ed visit date: 6/3/23  Diagnosis Description: L knee contusion  In Network? Yes Marilu Lugo [facility]  Discharge status: Home  Discharged with meds ? Yes  Number of ED visits to date: 5  ED Severity:3     Outreach Information    Outreach successful: Yes 1  Date letter mailed:NA  Date Finalized:6/5/23    Care Coordination    Follow up appointment with pcp: no no  Transportation issues ?  No    Value [employer] type: [address] [facility] PCP: No  Called PCP first?: No  Feels able to call PCP for urgent problems ?: Yes  Understands what emergencies can be handled by PCP ?: Yes  Ever any problems getting appointment with PCP for minor emergency/urgency problems?: No  Practice Contacted Patient ?: No  Pt had ED follow up with pcp/staff ?: No    Seen for follow-up out of network ?: No  Reason Patient went to ED instead of Urgent Care or PCP?: Perceived Severity of Illness  06/05/2023 03:37 PM EDT by Melissa Phillips 06/05/2023 03:37 PM EDT by Dl Chand (Self) [phone] (WIGSBV)[phone] (Mobile)  [phone] (Mobile) Remove  - Communicated - sees PT next week will discuss then

## 2023-06-05 NOTE — PSYCH
"Behavioral Health Psychotherapy Progress Note    Psychotherapy Provided: Individual Psychotherapy     1  Mixed obsessional thoughts and acts        2  Severe depressed bipolar I disorder without psychotic features ([address] )            Goals addressed in session: Goal 1     DATA:   More Desir reported that she fell again and with wearing shorts today, it was obvious as she was all scraped up on her one knee and had an ace bandage around the other  More Desir lives with her sister and her niece  She said she thinks \"they are going through her things  \"  She mentioned a gift card that may have fallen out of her pants pocket and wonders if her niece found the card and kept it  We problems solved around things she can do like get a lock-box  More Desir said \"I don't want to accuse anyone wrongly as I have to live with them  \"  More Desir talked about her sister and how \"mean\" her sister can be toward her and we discussed what she can do to help herself in this situation too  During this session, this clinician used the following therapeutic modalities: Solution-Focused Therapy    Substance Abuse was not addressed during this session  If the client is diagnosed with a co-occurring substance use disorder, please indicate any changes in the frequency or amount of use: N/A  Stage of change for addressing substance use diagnoses: No substance use/Not applicable    ASSESSMENT:  Yaquelin Gilbert presents with a Euthymic/ normal mood  her affect is Normal range and intensity, which is congruent, with her mood and the content of the session  The client has not made progress on their goals  Yaquelin Gilbert presents with a none risk of suicide, none risk of self-harm, and none risk of harm to others  For any risk assessment that surpasses a \"low\" rating, a safety plan must be developed      A safety plan was indicated: no  If yes, describe in detail N/A    PLAN: Between sessions, Yaquelin Gilbert will look into getting a lock box " and find things that she enjoys to offset the stress she feels from her sister  At the next session, the therapist will use Solution-Focused Therapy to address needs/concerns  Behavioral Health Treatment Plan and Discharge Planning: Anine Barakat is aware of and agrees to continue to work on their treatment plan  They have identified and are working toward their discharge goals   yes    Visit start and stop times:    06/05/23  Start Time: 1000  Stop Time: 1050  Total Visit Time: 50 minutes

## 2023-06-09 ENCOUNTER — TELEPHONE (OUTPATIENT)
Dept: GASTROENTEROLOGY | Facility: CLINIC | Age: 63
End: 2023-06-09

## 2023-06-09 NOTE — PROGRESS NOTES
Assessment/Plan:   Tin Paul is a 63 y  o female who is here for Hernia (New patient referred by her PCP for evaluation of umbilical hernia  Patient unsure of how long she has had it  States she has pain at area  No n/v/f/c  History of chronic kidney disease-stage IV  Just had AV shunt placed at let wrist for hemodialysis  In process of taking about kidney transplant )    Plan: Umbilical hernia -discussed operative vs conservative mgt, surgical approaches, risks and benefits and patient will consider her options  I will schedule at their earliest convenience  Preoperative Clearance: None    HPI:  Tin Paul is a 63 y  o female who was referred for evaluation of Hernia (New patient referred by her PCP for evaluation of umbilical hernia  Patient unsure of how long she has had it  States she has pain at area  No n/v/f/c  History of chronic kidney disease-stage IV  Just had AV shunt placed at let wrist for hemodialysis  In process of taking about kidney transplant )    Currently umbilical hernia       ROS:  General ROS: negative  negative for - chills, fatigue, fever or night sweats, weight loss  Respiratory ROS: no cough, shortness of breath, or wheezing  Cardiovascular ROS: no chest pain or dyspnea on exertion  Genito-Urinary ROS: no dysuria, trouble voiding, or hematuria  Musculoskeletal ROS: negative for - gait disturbance, joint pain or muscle pain  Neurological ROS: no TIA or stroke symptoms  umb hernia    [unfilled]  Pollen extract    Current Outpatient Medications:   •  acetaminophen (TYLENOL) 650 mg CR tablet, Take 1 tablet (650 mg total) by mouth every 8 (eight) hours as needed for mild pain, Disp: 30 tablet, Rfl: 0  •  albuterol (Ventolin HFA) 90 mcg/act inhaler, Inhale 2 puffs every 6 (six) hours as needed for wheezing or shortness of breath, Disp: 8 5 g, Rfl: 3  •  AMILoride 5 mg tablet, Take 1 tablet (5 mg total) by mouth 2 (two) times a day, Disp: 180 tablet, Rfl: 3  • amLODIPine (NORVASC) 5 mg tablet, Take 1 tablet (5 mg total) by mouth daily, Disp: 90 tablet, Rfl: 3  •  aspirin 81 mg chewable tablet, Chew 1 tablet (81 mg total) daily, Disp: , Rfl:   •  budesonide-formoterol (Symbicort) 160-4 5 mcg/act inhaler, Inhale 2 puffs 2 (two) times a day Rinse mouth after use , Disp: 10 2 g, Rfl: 3  •  carvedilol (COREG) 25 mg tablet, TAKE 1 TABLET BY MOUTH  TWICE DAILY WITH MEALS, Disp: 180 tablet, Rfl: 3  •  cholecalciferol (VITAMIN D3) 1,000 units tablet, Take 5 tablets (5,000 Units total) by mouth daily, Disp: 90 tablet, Rfl: 5  •  clonazePAM (KlonoPIN) 0 5 mg tablet, TAKE 1 TABLET BY MOUTH 4 TIMES  DAILY, Disp: 360 tablet, Rfl: 0  •  fluvoxaMINE (LUVOX) 100 mg tablet, Fluvoxamine 100m tablet in the morning ; 2 tablets at night, Disp: 270 tablet, Rfl: 0  •  lamoTRIgine (LaMICtal) 200 MG tablet, Lamotrigine 200m tablet po in the morning + 1 tablet po at night, Disp: 180 tablet, Rfl: 0  •  omeprazole (PriLOSEC) 40 MG capsule, TAKE 1 CAPSULE BY MOUTH  DAILY BEFORE BREAKFAST, Disp: 90 capsule, Rfl: 3  •  QUEtiapine (SEROquel) 100 mg tablet, Quetiapine 100mg : 1 tablet + 400mg po at night, Disp: 90 tablet, Rfl: 0  •  QUEtiapine (SEROquel) 300 mg tablet, Quetiapine 300m tablet po  in morning, Disp: 90 tablet, Rfl: 0  •  QUEtiapine (SEROquel) 400 MG tablet, Quetiapine 400m tablet + 100mg tablet po at night, Disp: 90 tablet, Rfl: 0  •  rosuvastatin (CRESTOR) 20 MG tablet, TAKE 1 TABLET BY MOUTH IN  THE EVENING, Disp: 90 tablet, Rfl: 3  •  ARIPiprazole (ABILIFY) 10 mg tablet, Aripiprazole 10 mg : 1 tablet po daily (Patient not taking: Reported on 2023), Disp: 90 tablet, Rfl: 0  •  fluocinonide (LIDEX) 0 05 % ointment, Apply topically 2 (two) times a day, Disp: 60 g, Rfl: 1  •  methocarbamol (ROBAXIN) 500 mg tablet, Take 1 tablet (500 mg total) by mouth 3 (three) times a day (Patient not taking: Reported on 2023), Disp: 90 tablet, Rfl: 0  •  methylPREDNISolone 4 MG tablet therapy pack, Use as directed on package, Disp: 21 tablet, Rfl: 0  •  ondansetron (ZOFRAN) 4 mg tablet, Take 1 tablet (4 mg total) by mouth every 8 (eight) hours as needed for nausea or vomiting, Disp: 60 tablet, Rfl: 1  Past Medical History:   Diagnosis Date   • Anxiety    • Anxiety disorder    • Arthritis    • At risk for falls    • Bipolar 2 disorder (HCC)    • Chronic back pain    • Chronic kidney disease    • Closed fracture of distal end of right fibula with routine healing 2020   • COVID-19     in 2021   • CVA (cerebral vascular accident) [facility])     noted on MRI in the past   • Depression    • GERD (gastroesophageal reflux disease)    • Hypercholesteremia    • Hypernatremia    • Hypertension    • Hypokalemia    • Idiopathic chronic pancreatitis ([facility] [address] ) 2018   • Intervertebral disc disorder with radiculopathy of lumbosacral region     resolved: 2015   • Kidney disease    • Limb alert care status     LUE-fistula   • Panic attacks    • Pericardial effusion    • PONV (postoperative nausea and vomiting)    • Psychiatric problem    • Radiculitis     resolved: 2015   • Secondary renal hyperparathyroidism ([facility] [address] )    • Stroke [facility])    • Vitamin D deficiency      Past Surgical History:   Procedure Laterality Date   • BUNIONECTOMY      Left foot    • CAST APPLICATION Right 3/00/3021    Procedure: Application short-arm thumb spica splint;  Surgeon: Marya Awan MD;  Location:  MAIN OR;  Service: Orthopedics   • COLON SURGERY     • COLONOSCOPY  2021   • DILATION AND CURETTAGE OF UTERUS     • INDUCED       surgically induced   • IN ARTERIOVENOUS ANASTOMOSIS OPEN DIRECT Left 2019    Procedure: CREATION FISTULA ARTERIOVENOUS (AV) left wrists possible left upper;  Surgeon: Lizzeth Toledo MD;  Location:  MAIN OR;  Service: Vascular   • IN CORRJ [address] W/SESMDC W/DIST Ghislaine Abler Left 2019    Procedure: Radha Noyola;  Surgeon: Reagan Murray DPM; Location: [facility] MAIN OR;  Service: Podiatry   • [address]  97  W/SESMDC W/DIST Papa Shadow Right 8/3/2020    Procedure: Zehra Diver;  Surgeon: Lennie Celis DPM;  Location: [facility];  Service: Podiatry   • OH CORRJ HALLUX VALGUS W/SESMDC W/PROX PHLNX OSTEOT Right 9/27/2021    Procedure: [place] Radha, right tameka osteotomy and 2nd claw toe correction;  Surgeon: Norman Mcdaniels MD;  Location: [facility];  Service: Orthopedics   • OH ERCP DX COLLECTION SPECIMEN BRUSHING/WASHING N/A 4/11/2018    Procedure: ENDOSCOPIC RETROGRADE CHOLANGIOPANCREATOGRAPHY (ERCP); Surgeon: Fidencio Mike MD;  Location: [facility];  Service: Gastroenterology   • OH LAPAROSCOPY PROCTOPEXY PROLAPSE N/A 7/13/2018    Procedure: ROBOTIC SIGMOID RESECTION / RECTOPEXY;  Surgeon: Jone Lynn MD;  Location: [facility];  Service: Colorectal   • OH OPEN TREATMENT RADIAL SHAFT FRACTURE Right 10/11/2021    Procedure: OPEN REDUCTION W/ INTERNAL FIXATION (ORIF) RADIUS (WRIST), RIGHT DISTAL;  Surgeon: Nydia Cohn MD;  Location: [facility];  Service: Orthopedics   • OH REMOVAL IMPLANT DEEP Right 6/23/2022    Procedure: Removal of hardware volar aspect right distal radius (distal radial plate and screws);   Surgeon: Liliana Atwood MD;  [facility]: [facility];  Service: Orthopedics   • OH SIGMOIDOSCOPY FLX DX W/COLLJ SPEC BR/WA IF PFRMD N/A 7/13/2018    Procedure: Arie Miller;  Surgeon: Jone Lynn MD;  Location: [facility];  Service: Colorectal   • OH TR TDN RESTORE INTRNSC FUNCJ ALL 4 FNGRS Right 6/23/2022    Procedure: Right ring finger flexor digitorum superficialis to flexor pollicis longus tendon transfer;  Surgeon: Liliana Atwood MD;  Location: [facility];  Service: Orthopedics   • TUBAL LIGATION Bilateral 1997   • [address] THYROID BIOPSY  7/30/2019     Family History   Problem Relation Age of Onset   • Bipolar disorder Mother    • Mental illness Mother         depression   • Stroke Mother    • Dementia Mother    • Colon polyps Mother    • Heart disease Father    • Hypertension Father    • Diabetes Father    • Other Family         Back disorder   • Diabetes Family    • Heart disease Family    • Hypertension Family    • Stroke Family    • Thyroid disease Family    • Breast cancer Paternal Grandmother         age unknown   • Breast cancer Paternal Aunt         age unknown   • Breast cancer Maternal Aunt         age unknown   • Mental illness Sister    • Colon polyps Sister    • Mental illness Sister    • Heart disease Sister    • No Known Problems Sister    • Breast cancer Sister 76   • Other Son         pituitary tumor   • Hypertension Son    • Obesity Son    • No Known Problems Son    • No Known Problems Maternal Grandmother    • No Known Problems Maternal Grandfather    • No Known Problems Paternal Grandfather    • Breast cancer Paternal Aunt         age unknown   • Substance Abuse Neg Hx         neg fam hx   • Colon cancer Neg Hx       reports that she has never smoked  She has never used smokeless tobacco  She reports that she does not currently use drugs  She reports that she does not drink alcohol  Labs:   Lab Results   Component Value Date    HGB 10 7 (L) 04/22/2023    HGB 11 3 (L) 02/20/2023    HGB 11 5 01/17/2023    HGB 11 0 (L) 02/27/2017     04/22/2023     02/20/2023     01/17/2023     02/27/2017    WBC 5 62 04/22/2023    WBC 5 21 02/20/2023    WBC 5 08 01/17/2023    WBC 5 2 02/27/2017     Lab Results   Component Value Date    ALT 12 04/22/2023    ALT 31 02/20/2023    ALT 23 01/17/2023    AST 20 04/22/2023    AST 27 02/20/2023    AST 19 01/17/2023     This SmartLink has not been configured with any valid records         PHYSICAL EXAM  General Appearance:    Alert, cooperative, no distress,    Head:    Normocephalic without obvious abnormality   Eyes:    PERRL, conjunctiva/corneas clear, EOM's intact        Neck:   Supple, no adenopathy, no JVD   Back:     Symmetric, "no spinal or CVA tenderness   Lungs:     Clear to auscultation bilaterally, no wheezing or rhonchi   Heart:    Regular rate and rhythm, S1 and S2 normal, no murmur   Abdomen:     Soft +BS ND NT + umb hernia   Extremities:   Extremities normal  No clubbing, cyanosis or edema   Psych:   Normal Affect, AOx3  Neurologic:  Skin:   CNII-XII intact  Strength symmetric, speech intact    Warm, dry, intact, no visible rashes or lesions         Physical Exam              Some portions of this record may have been generated with voice recognition software  There may be translation, syntax,  or grammatical errors  Occasional wrong word or \"sound-a-like\" substitutions may have occurred due to the inherent limitations of the voice recognition software  Read the chart carefully and recognize, using context, where substitutions may have occurred  If you have any questions, please contact the dictating provider for clarification or correction, as needed  This encounter has been coded by a non-certified coder     "

## 2023-06-10 ENCOUNTER — HOSPITAL ENCOUNTER (EMERGENCY)
Facility: HOSPITAL | Age: 63
Discharge: HOME | End: 2023-06-10
Attending: EMERGENCY MEDICINE
Payer: MEDICARE

## 2023-06-10 ENCOUNTER — APPOINTMENT (EMERGENCY)
Dept: RADIOLOGY | Facility: HOSPITAL | Age: 63
End: 2023-06-10
Payer: MEDICARE

## 2023-06-10 VITALS
DIASTOLIC BLOOD PRESSURE: 100 MMHG | RESPIRATION RATE: 17 BRPM | HEART RATE: 81 BPM | SYSTOLIC BLOOD PRESSURE: 136 MMHG | TEMPERATURE: 98 F | OXYGEN SATURATION: 96 %

## 2023-06-10 DIAGNOSIS — S93.602A FOOT SPRAIN, LEFT, INITIAL ENCOUNTER: ICD-10-CM

## 2023-06-10 DIAGNOSIS — S80.02XA CONTUSION OF LEFT KNEE, INITIAL ENCOUNTER: Primary | ICD-10-CM

## 2023-06-10 PROCEDURE — 99284 EMERGENCY DEPT VISIT MOD MDM: CPT

## 2023-06-10 PROCEDURE — 73630 X-RAY EXAM OF FOOT: CPT

## 2023-06-10 PROCEDURE — 73590 X-RAY EXAM OF LOWER LEG: CPT

## 2023-06-10 RX ORDER — TRAMADOL HYDROCHLORIDE 50 MG/1
50 TABLET ORAL ONCE
Status: COMPLETED | OUTPATIENT
Start: 2023-06-10 | End: 2023-06-10

## 2023-06-10 RX ADMIN — TRAMADOL HYDROCHLORIDE 50 MG: 50 TABLET ORAL at 11:30

## 2023-06-10 NOTE — ED PROVIDER NOTES
"History  Chief Complaint   Patient presents with   • Sarah Colin once last week and has abrasion to right knee, fell last night and hurt left knee  C/o left ankle and foot pain currently  Pt able to ambulate  Takes baby asa daily  Denies head strike or loc  States \"I fall because of my shoes they get caught on stuff around the house\"     This is a 68-year-old female who tripped and fell in the driveway yesterday onto her left knee also complains of left foot pain did not hit her head no loss of consciousness no neck or back pain  Did have a similar episode injuring her right knee just recently  Tetanus is up-to-date  History provided by:  Patient  Medical Problem  Location:  Right knee and foot  Quality:  Achy pain  Severity:  Moderate  Onset quality:  Sudden  Duration:  1 day  Timing:  Constant  Progression:  Waxing and waning  Chronicity:  New  Context:  Right foot and knee injury  Worsened by: Movement      Prior to Admission Medications   Prescriptions Last Dose Informant Patient Reported? Taking?    AMILoride 5 mg tablet  Self No No   Sig: Take 1 tablet (5 mg total) by mouth 2 (two) times a day   ARIPiprazole (ABILIFY) 10 mg tablet   No No   Sig: Aripiprazole 10 mg : 1 tablet po daily   Patient not taking: Reported on 2023   QUEtiapine (SEROquel) 100 mg tablet   No No   Sig: Quetiapine 100mg : 1 tablet + 400mg po at night   QUEtiapine (SEROquel) 300 mg tablet   No No   Sig: Quetiapine 300m tablet po  in morning   QUEtiapine (SEROquel) 400 MG tablet   No No   Sig: Quetiapine 400m tablet + 100mg tablet po at night   acetaminophen (TYLENOL) 650 mg CR tablet  Self No No   Sig: Take 1 tablet (650 mg total) by mouth every 8 (eight) hours as needed for mild pain   albuterol (Ventolin HFA) 90 mcg/act inhaler   No No   Sig: Inhale 2 puffs every 6 (six) hours as needed for wheezing or shortness of breath   amLODIPine (NORVASC) 5 mg tablet  Self No No   Sig: Take 1 tablet (5 mg total) by mouth daily " aspirin 81 mg chewable tablet  Self No No   Sig: Chew 1 tablet (81 mg total) daily   budesonide-formoterol (Symbicort) 160-4 5 mcg/act inhaler   No No   Sig: Inhale 2 puffs 2 (two) times a day Rinse mouth after use     carvedilol (COREG) 25 mg tablet   No No   Sig: TAKE 1 TABLET BY MOUTH  TWICE DAILY WITH MEALS   cholecalciferol (VITAMIN D3) 1,000 units tablet  Self No No   Sig: Take 5 tablets (5,000 Units total) by mouth daily   clonazePAM (KlonoPIN) 0 5 mg tablet   No No   Sig: TAKE 1 TABLET BY MOUTH 4 TIMES  DAILY   fluocinonide (LIDEX) 0 05 % ointment   No No   Sig: Apply topically 2 (two) times a day   fluvoxaMINE (LUVOX) 100 mg tablet  Self No No   Sig: Fluvoxamine 100m tablet in the morning ; 2 tablets at night   lamoTRIgine (LaMICtal) 200 MG tablet   No No   Sig: Lamotrigine 200m tablet po in the morning + 1 tablet po at night   methocarbamol (ROBAXIN) 500 mg tablet  Self No No   Sig: Take 1 tablet (500 mg total) by mouth 3 (three) times a day   Patient not taking: Reported on 2023   methylPREDNISolone 4 MG tablet therapy pack   No No   Sig: Use as directed on package   omeprazole (PriLOSEC) 40 MG capsule   No No   Sig: TAKE 1 CAPSULE BY MOUTH  DAILY BEFORE BREAKFAST   ondansetron (ZOFRAN) 4 mg tablet   No No   Sig: Take 1 tablet (4 mg total) by mouth every 8 (eight) hours as needed for nausea or vomiting   rosuvastatin (CRESTOR) 20 MG tablet   No No   Sig: TAKE 1 TABLET BY MOUTH IN  THE EVENING      Facility-Administered Medications: None       Past Medical History:   Diagnosis Date   • Anxiety    • Anxiety disorder    • Arthritis    • At risk for falls    • Bipolar 2 disorder (HCC)    • Chronic back pain    • Chronic kidney disease    • Closed fracture of distal end of right fibula with routine healing 2020   • COVID-19     in 2021   • CVA (cerebral vascular accident) [facility])     noted on MRI in the past   • Depression    • GERD (gastroesophageal reflux disease)    • Hypercholesteremia    • Hypernatremia    • Hypertension    • Hypokalemia    • Idiopathic chronic pancreatitis ([facility] [address] ) 2018   • Intervertebral disc disorder with radiculopathy of lumbosacral region     resolved: 2015   • Kidney disease    • Limb alert care status     LUE-fistula   • Panic attacks    • Pericardial effusion    • PONV (postoperative nausea and vomiting)    • Psychiatric problem    • Radiculitis     resolved: 2015   • [facility]    • Stroke [facility])    • Vitamin D deficiency        Past Surgical History:   Procedure Laterality Date   • BUNIONECTOMY      Left foot    • CAST APPLICATION Right 8/15/6608    Procedure: Application short-arm thumb spica splint;  Surgeon: Alisha Robb MD;  [facility]: [facility];  Service: Orthopedics   • COLON SURGERY     • COLONOSCOPY  2021   • DILATION AND CURETTAGE OF UTERUS     • INDUCED       surgically induced   • DC ARTERIOVENOUS ANASTOMOSIS OPEN DIRECT Left 2019    Procedure: CREATION FISTULA ARTERIOVENOUS (AV) left wrists possible left upper;  Surgeon: Piyush Villasenor MD;  Location: [facility];  Service: Vascular   • DC [place] W/SESMDC W/DIST [place] Left 2019    Procedure: Genita Mylar;  Surgeon: Vidhya Lara DPM;  Location[facility];  Service: Podiatry   • DC CORRJ [address] W/SESMDC W/DIST [place] Right 8/3/2020    Procedure: Uzair Spire;  Surgeon: Vidhya Lara DPM;  Location[facility];  Service: Podiatry   • DC CORRJ HALLUX VALGUS W/SESMDC W/PROX PHLNX OSTEOT Right 2021    Procedure: Teofilo Seamen, right tameka osteotomy and 2nd claw toe correction;  Surgeon: Aditya Myers MD;  Location: [facility];  Service: Orthopedics   • DC ERCP DX COLLECTION SPECIMEN BRUSHING/WASHING N/A 2018    Procedure: ENDOSCOPIC RETROGRADE CHOLANGIOPANCREATOGRAPHY (ERCP);   Surgeon: Maria Del Rosario Reese MD;  [facility];  Service: Gastroenterology • MO LAPAROSCOPY PROCTOPEXY PROLAPSE N/A 7/13/2018    Procedure: ROBOTIC SIGMOID RESECTION / RECTOPEXY;  Surgeon: Jone Lynn MD;  [facility];  Service: Colorectal   • MO OPEN TREATMENT RADIAL SHAFT FRACTURE Right 10/11/2021    Procedure: OPEN REDUCTION W/ INTERNAL FIXATION (ORIF) RADIUS (WRIST), RIGHT DISTAL;  Surgeon: Nydia Cohn MD;  Location: [facility];  Service: Orthopedics   • MO REMOVAL IMPLANT DEEP Right 6/23/2022    Procedure: Removal of hardware volar aspect right distal radius (distal radial plate and screws);   Surgeon: Liliana Atwood MD;  [facility]: [facility];  Service: Orthopedics   • MO SIGMOIDOSCOPY FLX DX W/COLLJ SPEC BR/WA IF PFRMD N/A 7/13/2018    Procedure: Arie Miller;  Surgeon: Jone Lynn MD;  [facility];  Service: Colorectal   • MO TR TDN RESTORE INTRNSC FUNCJ ALL 4 FNGRS Right 6/23/2022    Procedure: Right ring finger flexor digitorum superficialis to flexor pollicis longus tendon transfer;  Surgeon: Liliana Atwood MD;  Location: [facility];  Service: Orthopedics   • TUBAL LIGATION Bilateral 1997   • Donnie [address] THYROID BIOPSY  7/30/2019       Family History   Problem Relation Age of Onset   • Bipolar disorder Mother    • Mental illness Mother         depression   • Stroke Mother    • Dementia Mother    • Colon polyps Mother    • Heart disease Father    • Hypertension Father    • Diabetes Father    • Other Family         Back disorder   • Diabetes Family    • Heart disease Family    • Hypertension Family    • Stroke Family    • Thyroid disease Family    • Breast cancer Paternal Grandmother         age unknown   • Breast cancer Paternal Aunt         age unknown   • Breast cancer Maternal Aunt         age unknown   • Mental illness Sister    • Colon polyps Sister    • Mental illness Sister    • Heart disease Sister    • No Known Problems Sister    • Breast cancer Sister 76   • Other Son         pituitary tumor   • Hypertension Son    • Obesity Son    • No Known Problems Son    • No Known Problems Maternal Grandmother    • No Known Problems Maternal Grandfather    • No Known Problems Paternal Grandfather    • Breast cancer Paternal Aunt         age unknown   • Substance Abuse Neg Hx         neg fam hx   • Colon cancer Neg Hx      I have reviewed and agree with the history as documented  E-Cigarette/Vaping   • E-Cigarette Use Never User      E-Cigarette/Vaping Substances   • Nicotine No    • THC No    • CBD No    • Flavoring No    • Other No    • Unknown No      Social History     Tobacco Use   • Smoking status: Never   • Smokeless tobacco: Never   Vaping Use   • Vaping Use: Never used   Substance Use Topics   • Alcohol use: Never   • Drug use: Not Currently       Review of Systems   Musculoskeletal:        Left knee and foot pain   Skin: Positive for wound  All other systems reviewed and are negative  Physical Exam  Physical Exam  Vitals and nursing note reviewed  Constitutional:       General: She is not in acute distress  Appearance: She is not ill-appearing, toxic-appearing or diaphoretic  HENT:      Head: Normocephalic and atraumatic  Right Ear: External ear normal       Left Ear: External ear normal    Eyes:      General:         Right eye: No discharge  Left eye: No discharge  Extraocular Movements: Extraocular movements intact  Pupils: Pupils are equal, round, and reactive to light  Cardiovascular:      Rate and Rhythm: Normal rate and regular rhythm  Pulses: Normal pulses  Heart sounds: No murmur heard  No friction rub  No gallop  Pulmonary:      Effort: Pulmonary effort is normal  No respiratory distress  Breath sounds: No stridor  No wheezing, rhonchi or rales  Abdominal:      General: There is no distension  Palpations: Abdomen is soft  Tenderness: There is no abdominal tenderness  There is no guarding or rebound     Musculoskeletal:         General: Tenderness and signs of injury present  Cervical back: Normal range of motion and neck supple  No rigidity  Right lower leg: No edema  Left lower leg: No edema  Comments: Tenderness left midfoot and anterior right knee without any gross deformity pulses and gross sensation and movement intact Achilles tendon is intact   Skin:     Coloration: Skin is not jaundiced  Findings: Lesion present  No bruising  Comments: Bilateral knee abrasions   Neurological:      General: No focal deficit present  Mental Status: She is alert and oriented to person, place, and time  Cranial Nerves: No cranial nerve deficit  Sensory: No sensory deficit  Coordination: Coordination normal    Psychiatric:         Mood and Affect: Mood normal          Behavior: Behavior normal          Thought Content: Thought content normal          Vital Signs  ED Triage Vitals [06/10/23 1016]   Temperature Pulse Respirations Blood Pressure SpO2   98 °F (36 7 °C) 86 18 140/63 96 %      Temp Source Heart Rate Source Patient Position - Orthostatic VS BP Location FiO2 (%)   Temporal Monitor Lying Right arm --      Pain Score       5           Vitals:    06/10/23 1016 06/10/23 1030   BP: 140/63 136/100   Pulse: 86 81   Patient Position - Orthostatic VS: Lying Lying         Visual Acuity      ED Medications  Medications - No data to display    Diagnostic Studies  Results Reviewed     None                 XR foot 3+ views LEFT   ED Interpretation by Mary Ruby DO (06/10 1123)   No acute fracture or dislocation      XR tibia fibula 2 views LEFT   ED Interpretation by Mary Ruby DO (06/10 1124)   No acute fracture or dislocation                 Procedures  Procedures         ED Course                               SBIRT 22yo+    Flowsheet Row Most Recent Value   Initial Alcohol Screen: US AUDIT-C     1  How often do you have a drink containing alcohol? 0 Filed at: 06/10/2023 1022   2   How many drinks containing alcohol do you have on a typical day you are drinking? 0 Filed at: 06/10/2023 1022   3a  Male UNDER 65: How often do you have five or more drinks on one occasion? 0 Filed at: 06/10/2023 1022   3b  FEMALE Any Age, or MALE 65+: How often do you have 4 or more drinks on one occassion? 0 Filed at: 06/10/2023 1022   Audit-C Score 0 Filed at: 06/10/2023 1022   ZAYRA: How many times in the past year have you    Used an illegal drug or used a prescription medication for non-medical reasons? Never Filed at: 06/10/2023 1022                    Medical Decision Making  Left knee and foot injury we will check x-rays rule out fracture dislocation    Amount and/or Complexity of Data Reviewed  Radiology: ordered  Disposition  Final diagnoses:   Contusion of left knee, initial encounter   Foot sprain, left, initial encounter     Time reflects when diagnosis was documented in both MDM as applicable and the Disposition within this note     Time User Action Codes Description Comment    6/10/2023 11:24 AM Wandy Conley Add [S80 02XA] Contusion of left knee, initial encounter     6/10/2023 11:24 AM Wandy Conley Add [S93 602A] Foot sprain, left, initial encounter       ED Disposition     ED Disposition   Discharge    Condition   Stable    Date/Time   Sat Jean 10, 2023 11:24 AM    Comment   Jazzy discharge to home/self care                 Follow-up Information     Follow up With Specialties Details Why Contact Info Additional [address],  Internal Medicine   [facility]  [address] [facility]  [address] [facility] [address] [facility],[address]        [address] Emergency Department Emergency Medicine  As needed, If symptoms worsen [address] [place],[address] [address]  [address] [facility] Emergency Department, [address] [facility] Angelita Rivera [address]          Patient's Medications   Discharge Prescriptions    No medications on file       No discharge procedures on file      PDMP Review       Value Time User    PDMP Reviewed  Yes 6/1/2023  1:00 PM Yao Meehan DO          ED Provider  Electronically Signed by           Sarah Wilkins DO  06/10/23 1121

## 2023-06-12 ENCOUNTER — VBI (OUTPATIENT)
Dept: ADMINISTRATIVE | Facility: OTHER | Age: 63
End: 2023-06-12

## 2023-06-12 NOTE — TELEPHONE ENCOUNTER
Olimpia Fermin    ED Visit Information     Ed visit date: 6/10/2023  Diagnosis Description: Contusion of left knee, initial encounter  In Network? Yes-[facility]  Discharge status: Home  Discharged with meds ? No  Number of ED visits to date: 6  ED Severity:4     Outreach Information    Outreach successful: No 3  Date letter mailed:6/14/2023  Date Finalized:6/14/2023    Care Coordination    Follow up appointment with pcp: no N/A  Transportation issues ? NA    Value Consolidated Huy    Outreach type: 7 Day Outreach  Care Coordination Status: In [facility]  Emergent necessity warranted by diagnosis: Yes  [facility] PCP: Yes  Transportation: Self Transport  If able to choose an ED   Would you have chosen [facility]: Yes  06/12/2023 09:30 AM EDT by Sabi Marin MA 06/12/2023 09:30 AM EDT by SAVI Balderas (Self) [phone] (PCJJVT)[phone] (Mobile)  [phone] (Mobile) Remove  - Left Message    06/13/2023 09:27 AM EDT by Sabi Marin MA 06/13/2023 09:27 AM EDT by SAVI Balderas (Self) [phone] ([phone] (Mobile)  [phone] (Mobile) Remove  - Left Message

## 2023-06-12 NOTE — LETTER
[facility]  [address] [facility] [address]    Date: 06/14/23  [place] [facility] [address]    Dear Kiet Cerna: Thank you for choosing [facility]'s emergency department for care  Your primary care provider wants to make sure that your ongoing medical care is being addressed  If you require follow up care as a result of your emergency department visit, there are a few things the practice would like you to know  As part of the network's continuing commitment to caring for our patients, we have added more same day appointments and have extended office hours to meet your medical needs  After hours, on-call physicians are available via your primary care provider's main office line  We encourage you to contact our office prior to seeking treatment to discuss your symptoms with the medical staff  Together, we can determine the correct course of action  A majority of non-emergent conditions such as: common cold, flu-like symptoms, fevers, strains/sprains, dislocations, minor burns, cuts and animal bites can be treated at [address] facilities  Diagnostic testing is available at some sites  Of course, if you are experiencing a life threatening medical emergency call 911 or proceed directly to the nearest emergency room  Your nearest [facility] facility is conveniently located at:    [address]  [address]  [address], [address] [place] [address] Road  [phone]  SKIP THE WAIT  Conveniently offered at most [facility] locations  Rector your spot online at www [email or web address] or on the [facility] [address]    Sincerely,    [facility]  No information on file

## 2023-06-13 ENCOUNTER — OFFICE VISIT (OUTPATIENT)
Dept: PHYSICAL THERAPY | Facility: CLINIC | Age: 63
End: 2023-06-13
Payer: MEDICARE

## 2023-06-13 DIAGNOSIS — F31.9 BIPOLAR 1 DISORDER (HCC): ICD-10-CM

## 2023-06-13 DIAGNOSIS — M54.16 LUMBAR RADICULOPATHY: ICD-10-CM

## 2023-06-13 DIAGNOSIS — M17.12 PRIMARY OSTEOARTHRITIS OF LEFT KNEE: Primary | ICD-10-CM

## 2023-06-13 PROCEDURE — 97140 MANUAL THERAPY 1/> REGIONS: CPT

## 2023-06-13 PROCEDURE — 97112 NEUROMUSCULAR REEDUCATION: CPT

## 2023-06-13 PROCEDURE — 97110 THERAPEUTIC EXERCISES: CPT

## 2023-06-13 RX ORDER — QUETIAPINE FUMARATE 300 MG/1
TABLET, FILM COATED ORAL
Qty: 90 TABLET | Refills: 0 | Status: SHIPPED | OUTPATIENT
Start: 2023-06-13

## 2023-06-13 RX ORDER — QUETIAPINE FUMARATE 100 MG/1
TABLET, FILM COATED ORAL
Qty: 90 TABLET | Refills: 0 | Status: SHIPPED | OUTPATIENT
Start: 2023-06-13

## 2023-06-13 RX ORDER — QUETIAPINE FUMARATE 400 MG/1
TABLET, FILM COATED ORAL
Qty: 90 TABLET | Refills: 0 | Status: SHIPPED | OUTPATIENT
Start: 2023-06-13

## 2023-06-13 NOTE — PROGRESS NOTES
"Daily Note     Today's date: 2023  Patient name: Lashon Bella  : 1960  MRN: [id number]  Referring provider: Luis Anna, *  Dx:   Encounter Diagnosis     ICD-10-CM    1  Primary osteoarthritis of left knee  M17 12       2  Lumbar radiculopathy  M54 16                      Subjective: patient reports 2 falls, 1 wk apart, she went to the ER for both  Imaging done, unremarkable  Objective: See treatment diary below    B/L knee wounds inspected, L knee C/D/I, scabs are well formed  R knee C/I moist from neosporin application  Instructed to keep wounds clean, dry, open to air  clen with warm soapy water  Assessment:  Good tolerance to low level TE, but limited session d/t knee pain  Fair quad activation noted B/L  add TKE NV  Added static balance, moderate swaying noted, but completed without UE support  Minimal guarding intially with PROM  Continued PT would be beneficial to improve function           Plan: Continue per plan of care         EPOC: 8/10/23      Manuals            L Knee PROM  MB           Gentle L tibiofemoral mobs                                       Neuro Re-Ed             L quad set  20x5\"           H HS set             SLR iso  10x5\"           SAQ iso  x20 5\"           LAQ iso x20 5\" x20 5\"           Tandem Stance  2x20 sec           NBOS  3x20 sec           Ther Ex             HR  20x           TR  20x           Slant board                                                                 L Knee TERT with heat Into EXT 5'            Ther Activity             LE Bike for improved L knee ROM  NV           Pt education: pathoanatomy, nature of sxs, POC, HEP 15' 10 MB           Gait Training                                       Modalities                                            "

## 2023-06-13 NOTE — TELEPHONE ENCOUNTER
Pt requested refills of all 3 strengths of the Seroquel and of the Lamotrigine be sent to her mail order for a 90 day supply     Dr Josue Siddiqi pt

## 2023-06-15 ENCOUNTER — OFFICE VISIT (OUTPATIENT)
Dept: FAMILY MEDICINE CLINIC | Facility: HOSPITAL | Age: 63
End: 2023-06-15
Payer: MEDICARE

## 2023-06-15 ENCOUNTER — TELEPHONE (OUTPATIENT)
Dept: NEPHROLOGY | Facility: CLINIC | Age: 63
End: 2023-06-15

## 2023-06-15 ENCOUNTER — APPOINTMENT (OUTPATIENT)
Dept: PHYSICAL THERAPY | Facility: CLINIC | Age: 63
End: 2023-06-15
Payer: MEDICARE

## 2023-06-15 VITALS
DIASTOLIC BLOOD PRESSURE: 80 MMHG | SYSTOLIC BLOOD PRESSURE: 138 MMHG | WEIGHT: 225 LBS | HEART RATE: 83 BPM | BODY MASS INDEX: 35.31 KG/M2 | OXYGEN SATURATION: 95 % | HEIGHT: 67 IN

## 2023-06-15 DIAGNOSIS — F42.9 OBSESSIVE-COMPULSIVE DISORDER, UNSPECIFIED TYPE: ICD-10-CM
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
## 2023-06-15 DIAGNOSIS — S80.212A ABRASION, KNEE, LEFT, INITIAL ENCOUNTER: ICD-10-CM

## 2023-06-15 DIAGNOSIS — K59.03 DRUG-INDUCED CONSTIPATION: ICD-10-CM

## 2023-06-15 DIAGNOSIS — F31.81 BIPOLAR 2 DISORDER (HCC): Chronic | ICD-10-CM

## 2023-06-15 DIAGNOSIS — Z12.31 ENCOUNTER FOR SCREENING MAMMOGRAM FOR MALIGNANT NEOPLASM OF BREAST: ICD-10-CM

## 2023-06-15 PROCEDURE — 99214 OFFICE O/P EST MOD 30 MIN: CPT | Performed by: INTERNAL MEDICINE

## 2023-06-15 RX ORDER — LAMOTRIGINE 200 MG/1
TABLET ORAL
Qty: 180 TABLET | Refills: 0 | Status: SHIPPED | OUTPATIENT
Start: 2023-06-15 | End: 2023-06-27

## 2023-06-15 RX ORDER — FLUVOXAMINE MALEATE 100 MG
TABLET ORAL
Qty: 270 TABLET | Refills: 1 | Status: SHIPPED | OUTPATIENT
Start: 2023-06-15 | End: 2023-08-17 | Stop reason: SDUPTHER

## 2023-06-15 RX ORDER — CLONAZEPAM 0.5 MG/1
0.5 TABLET ORAL 4 TIMES DAILY
Qty: 120 TABLET | Refills: 3 | Status: SHIPPED | OUTPATIENT
Start: 2023-06-15 | End: 2023-08-03 | Stop reason: SDUPTHER

## 2023-06-15 NOTE — PROGRESS NOTES
Assessment/Plan:     Diagnosis ICD-10-CM Associated Orders   1  Fall, initial encounter  [address]  XXXA       2  Abrasion, knee, left, initial encounter  S80 212A mupirocin (BACTROBAN) 2 % ointment      3  Encounter for screening mammogram for malignant neoplasm of breast  Z12 31 Mammo screening bilateral w 3d & cad          Problem List Items Addressed This Visit    None  Visit Diagnoses     Fall, initial encounter    -  Primary    Abrasion, knee, left, initial encounter        Relevant Medications    mupirocin (BACTROBAN) 2 % ointment    Encounter for screening mammogram for malignant neoplasm of breast        Relevant Orders    Mammo screening bilateral w 3d & cad            Return keep appt in july- with ear irrigation  Subjective:    Patient ID: Giovanna Egan is a 63 y o  female    2 weeks ago- was helping dog down the step and hit right knee with abrasions- was washing and putting neopsorin  Then last week  Had fall onto patio with abrasion of left knee after tripping on flipflop- has abrasion on left knee  Also has swelling now in  left ankle has n had swelling a couple of days later  went to ED on 6/10 and  Had tib fib  And left foot xray without issues   had tetanus tdap in 2019   more knee pain currently   using tylenol for  Pain as unable to use nsaids with renal issues        The following portions of the patient's history were reviewed and updated as appropriate: allergies, current medications and problem list      Review of Systems   Constitutional: Negative for chills and fever  Respiratory: Negative for shortness of breath            Objective:      Current Outpatient Medications:   •  acetaminophen (TYLENOL) 650 mg CR tablet, Take 1 tablet (650 mg total) by mouth every 8 (eight) hours as needed for mild pain, Disp: 30 tablet, Rfl: 0  •  albuterol (Ventolin HFA) 90 mcg/act inhaler, Inhale 2 puffs every 6 (six) hours as needed for wheezing or shortness of breath, Disp: 8 5 g, Rfl: 3  • AMILoride 5 mg tablet, Take 1 tablet (5 mg total) by mouth 2 (two) times a day, Disp: 180 tablet, Rfl: 3  •  amLODIPine (NORVASC) 5 mg tablet, Take 1 tablet (5 mg total) by mouth daily, Disp: 90 tablet, Rfl: 3  •  aspirin 81 mg chewable tablet, Chew 1 tablet (81 mg total) daily, Disp: , Rfl:   •  budesonide-formoterol (Symbicort) 160-4 5 mcg/act inhaler, Inhale 2 puffs 2 (two) times a day Rinse mouth after use , Disp: 10 2 g, Rfl: 3  •  carvedilol (COREG) 25 mg tablet, TAKE 1 TABLET BY MOUTH  TWICE DAILY WITH MEALS, Disp: 180 tablet, Rfl: 3  •  cholecalciferol (VITAMIN D3) 1,000 units tablet, Take 5 tablets (5,000 Units total) by mouth daily, Disp: 90 tablet, Rfl: 5  •  clonazePAM (KlonoPIN) 0 5 mg tablet, Take 1 tablet (0 5 mg total) by mouth 4 (four) times a day (Patient taking differently: Take 0 5 mg by mouth 3 (three) times a day), Disp: 120 tablet, Rfl: 3  •  fluvoxaMINE (LUVOX) 100 mg tablet, Fluvoxamine 100m tablet in the morning ; 2 tablets at night, Disp: 270 tablet, Rfl: 1  •  lamoTRIgine (LaMICtal) 200 MG tablet, Lamotrigine 200m tablet po in the morning + 1 tablet po at night, Disp: 180 tablet, Rfl: 0  •  mupirocin (BACTROBAN) 2 % ointment, Apply topically daily, Disp: 22 g, Rfl: 0  •  omeprazole (PriLOSEC) 40 MG capsule, TAKE 1 CAPSULE BY MOUTH  DAILY BEFORE BREAKFAST, Disp: 90 capsule, Rfl: 3  •  ondansetron (ZOFRAN) 4 mg tablet, Take 1 tablet (4 mg total) by mouth every 8 (eight) hours as needed for nausea or vomiting, Disp: 60 tablet, Rfl: 1  •  QUEtiapine (SEROquel) 100 mg tablet, Quetiapine 100mg : 1 tablet + 400mg po at night, Disp: 90 tablet, Rfl: 0  •  QUEtiapine (SEROquel) 300 mg tablet, Quetiapine 300m tablet po  in morning, Disp: 90 tablet, Rfl: 0  •  QUEtiapine (SEROquel) 400 MG tablet, Quetiapine 400m tablet + 100mg tablet po at night, Disp: 90 tablet, Rfl: 0  •  rosuvastatin (CRESTOR) 20 MG tablet, TAKE 1 TABLET BY MOUTH IN  THE EVENING, Disp: 90 tablet, Rfl: 3  • "ARIPiprazole (ABILIFY) 10 mg tablet, Aripiprazole 10 mg : 1 tablet po daily (Patient not taking: Reported on 5/11/2023), Disp: 90 tablet, Rfl: 0  •  fluocinonide (LIDEX) 0 05 % ointment, Apply topically 2 (two) times a day (Patient not taking: Reported on 6/15/2023), Disp: 60 g, Rfl: 1  •  methocarbamol (ROBAXIN) 500 mg tablet, Take 1 tablet (500 mg total) by mouth 3 (three) times a day (Patient not taking: Reported on 4/27/2023), Disp: 90 tablet, Rfl: 0  •  methylPREDNISolone 4 MG tablet therapy pack, Use as directed on package, Disp: 21 tablet, Rfl: 0    Blood pressure 138/80, pulse 83, height 5' 7\" (1 702 m), weight 102 kg (225 lb), SpO2 95 %, not currently breastfeeding  Physical Exam  Vitals and nursing note reviewed  Constitutional:       Appearance: She is not ill-appearing  HENT:      Head: Normocephalic  Right Ear: There is impacted cerumen  Left Ear: There is impacted cerumen  Nose: No congestion  Mouth/Throat:      Pharynx: No posterior oropharyngeal erythema  Eyes:      Comments: Fading ecchymosis on right lower lid laterlaly   Cardiovascular:      Rate and Rhythm: Normal rate and regular rhythm  Abdominal:      Tenderness: There is no abdominal tenderness  Musculoskeletal:         General: Swelling present  Comments: Bilateral knee swelling right greater than left    left lateral nkle swelling    Skin:     Findings: No erythema or rash  Comments: No purulent driaange= some  Clear  Tissues draiange on left abrasion on lateral knee     Neurological:      General: No focal deficit present  Mental Status: She is alert and oriented to person, place, and time  Psychiatric:         Mood and Affect: Mood normal          Thought Content:  Thought content normal         "

## 2023-06-20 ENCOUNTER — APPOINTMENT (OUTPATIENT)
Dept: PHYSICAL THERAPY | Facility: CLINIC | Age: 63
End: 2023-06-20
Payer: MEDICARE

## 2023-06-21 DIAGNOSIS — K59.03 DRUG-INDUCED CONSTIPATION: ICD-10-CM

## 2023-06-27 ENCOUNTER — TELEPHONE (OUTPATIENT)
Dept: FAMILY MEDICINE CLINIC | Facility: HOSPITAL | Age: 63
End: 2023-06-27

## 2023-06-27 ENCOUNTER — OFFICE VISIT (OUTPATIENT)
Dept: PHYSICAL THERAPY | Facility: CLINIC | Age: 63
End: 2023-06-27
Payer: MEDICARE

## 2023-06-27 DIAGNOSIS — M54.16 LUMBAR RADICULOPATHY: ICD-10-CM

## 2023-06-27 DIAGNOSIS — M17.12 PRIMARY OSTEOARTHRITIS OF LEFT KNEE: Primary | ICD-10-CM

## 2023-06-27 PROCEDURE — 97110 THERAPEUTIC EXERCISES: CPT | Performed by: PHYSICAL THERAPIST

## 2023-06-27 PROCEDURE — 97530 THERAPEUTIC ACTIVITIES: CPT | Performed by: PHYSICAL THERAPIST

## 2023-06-27 RX ORDER — LAMOTRIGINE 200 MG/1
TABLET ORAL
Qty: 180 TABLET | Refills: 3 | Status: SHIPPED | OUTPATIENT
Start: 2023-06-27

## 2023-06-27 NOTE — TELEPHONE ENCOUNTER
PT CALLED FOR A REFILL ON TRAMADOL 50 MG, 1 Q 8 HRS PRN SEVERE PAIN  GOT 30 IN PAST  THIS IS NOT ACTIVE ON HER MED LIST    USES [facility]

## 2023-06-27 NOTE — PROGRESS NOTES
"Daily Note     Today's date: 2023  Patient name: Phong Campuzano  : 1960  MRN: [id number]  Referring provider: Estrada Bhat, *  Dx:   Encounter Diagnosis     ICD-10-CM    1  Primary osteoarthritis of left knee  M17 12       2  Lumbar radiculopathy  M54 16         Subjective: Compliant with HEP, no questions regarding POC, motivated to continue PT    Objective: See treatment diary below    Assessment: Tolerated treatment well with progression treatment program as noted below requiring verbal and tactile cues from PT for safe execution of therapeutic exercise  Patient demonstrated fatigue post treatment, exhibited good technique with therapeutic exercises and would benefit from continued PT    Plan: Progress treatment as tolerated         EPOC: 8/10/23      Manuals           L Knee PROM  MB           Gentle L tibiofemoral mobs                                       Neuro Re-Ed             L quad set  20x5\"           Seated HS stretch   5x10\" ea          SLR iso  10x5\" x20 3\" ea 1lb          SAQ iso  x20 5\" x20 3\" ea 1lb          LAQ iso x20 5\" x20 5\" x20 3\" ea 1lb           Tandem Stance  2x20 sec           NBOS  3x20 sec           SLS   5x10\" ea airex          Ther Ex             HR  20x x20          TR  20x x20          Slant board   10x10\"                                                              L Knee TERT with heat Into EXT 5'            Ther Activity             LE Bike for improved L knee ROM  NV 6' L1          Pt education: pathoanatomy, nature of sxs, POC, HEP 15' 10 MB 2' NS          Gait Training                                       Modalities                                            "

## 2023-06-29 ENCOUNTER — OFFICE VISIT (OUTPATIENT)
Dept: PHYSICAL THERAPY | Facility: CLINIC | Age: 63
End: 2023-06-29
Payer: MEDICARE

## 2023-06-29 DIAGNOSIS — M17.12 PRIMARY OSTEOARTHRITIS OF LEFT KNEE: Primary | ICD-10-CM

## 2023-06-29 PROCEDURE — 97530 THERAPEUTIC ACTIVITIES: CPT | Performed by: PHYSICAL THERAPIST

## 2023-06-29 PROCEDURE — 97110 THERAPEUTIC EXERCISES: CPT | Performed by: PHYSICAL THERAPIST
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
## 2023-06-29 NOTE — PROGRESS NOTES
"Daily Note     Today's date: 2023  Patient name: Peggy Appiah  : 1960  MRN: [id number]  Referring provider: Myles Pinto, *  Dx:   Encounter Diagnosis     ICD-10-CM    1  Primary osteoarthritis of left knee  M17 12         Subjective: Compliant with HEP, no questions regarding POC, motivated to continue PT    Objective: See treatment diary below     Assessment: Tolerated treatment well with progression treatment program as noted below requiring verbal and tactile cues from PT for safe execution of therapeutic exercise  Patient demonstrated fatigue post treatment, exhibited good technique with therapeutic exercises and would benefit from continued PT    Plan: Progress treatment as tolerated         EPOC: 8/10/23      Manuals           L Knee PROM           Gentle L tibiofemoral mobs                                 Neuro Re-Ed           L quad set           Seated HS stretch 5x10\" ea          SLR iso x20 3\" ea 2lb          SAQ iso x20 3\" ea 2lb          LAQ iso x20 3\" ea 2lb           Tandem Stance                      SLS 5x10\" ea airex          Ther Ex           HR x20          TR x20          Slant board 10x10\"          Rev slant squat 15 x5\"          SL Step up lat x20 ea 4\"                                L Knee TERT with heat Into EXT           Ther Activity           LE Bike for improved L knee ROM 6' L2          Pt education: pathoanatomy, nature of sxs, POC, HEP 2' NS          Gait Training                                 Modalities                                      "

## 2023-06-30 ENCOUNTER — TELEPHONE (OUTPATIENT)
Dept: FAMILY MEDICINE CLINIC | Facility: HOSPITAL | Age: 63
End: 2023-06-30

## 2023-06-30 DIAGNOSIS — M54.50 LUMBAR PAIN: ICD-10-CM

## 2023-06-30 DIAGNOSIS — M79.604 BILATERAL LEG PAIN: Primary | ICD-10-CM

## 2023-06-30 DIAGNOSIS — M79.605 BILATERAL LEG PAIN: Primary | ICD-10-CM

## 2023-06-30 RX ORDER — QUETIAPINE FUMARATE 400 MG/1
TABLET, FILM COATED ORAL
Qty: 90 TABLET | Refills: 3 | OUTPATIENT
Start: 2023-06-30

## 2023-06-30 RX ORDER — LAMOTRIGINE 200 MG/1
TABLET ORAL
Qty: 90 TABLET | Refills: 7 | OUTPATIENT
Start: 2023-06-30

## 2023-06-30 RX ORDER — TRAMADOL HYDROCHLORIDE 50 MG/1
50 TABLET ORAL EVERY 12 HOURS PRN
Qty: 60 TABLET | Refills: 0 | Status: SHIPPED | OUTPATIENT
Start: 2023-06-30 | End: 2023-07-19 | Stop reason: SDUPTHER

## 2023-06-30 RX ORDER — FLUVOXAMINE MALEATE 100 MG
TABLET ORAL
Qty: 270 TABLET | Refills: 3 | OUTPATIENT
Start: 2023-06-30

## 2023-06-30 RX ORDER — QUETIAPINE FUMARATE 300 MG/1
TABLET, FILM COATED ORAL
Qty: 90 TABLET | Refills: 3 | OUTPATIENT
Start: 2023-06-30

## 2023-06-30 RX ORDER — QUETIAPINE FUMARATE 100 MG/1
TABLET, FILM COATED ORAL
Qty: 90 TABLET | Refills: 3 | OUTPATIENT
Start: 2023-06-30

## 2023-06-30 NOTE — TELEPHONE ENCOUNTER
PT called again - states that she is doing physical therapy and she's having terrible back pain - would like to know if you would refill her Tramadol.

## 2023-07-03 ENCOUNTER — SOCIAL WORK (OUTPATIENT)
Dept: BEHAVIORAL/MENTAL HEALTH CLINIC | Facility: CLINIC | Age: 63
End: 2023-07-03
Payer: MEDICARE

## 2023-07-03 DIAGNOSIS — F50.9 EATING DISORDER, UNSPECIFIED TYPE: ICD-10-CM

## 2023-07-03 DIAGNOSIS — F31.4 SEVERE DEPRESSED BIPOLAR I DISORDER WITHOUT PSYCHOTIC FEATURES (HCC): Primary | ICD-10-CM

## 2023-07-03 PROCEDURE — 90834 PSYTX W PT 45 MINUTES: CPT | Performed by: COUNSELOR
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
## 2023-07-05 DIAGNOSIS — M54.50 LUMBAR PAIN: ICD-10-CM

## 2023-07-05 DIAGNOSIS — M79.605 BILATERAL LEG PAIN: ICD-10-CM

## 2023-07-05 DIAGNOSIS — M79.604 BILATERAL LEG PAIN: ICD-10-CM

## 2023-07-06 ENCOUNTER — TELEPHONE (OUTPATIENT)
Dept: NEPHROLOGY | Facility: CLINIC | Age: 63
End: 2023-07-06

## 2023-07-06 ENCOUNTER — OFFICE VISIT (OUTPATIENT)
Dept: FAMILY MEDICINE CLINIC | Facility: HOSPITAL | Age: 63
End: 2023-07-06
Payer: MEDICARE

## 2023-07-06 ENCOUNTER — HOSPITAL ENCOUNTER (OUTPATIENT)
Dept: RADIOLOGY | Facility: HOSPITAL | Age: 63
Discharge: HOME | End: 2023-07-06
Payer: MEDICARE

## 2023-07-06 VITALS
BODY MASS INDEX: 35.55 KG/M2 | DIASTOLIC BLOOD PRESSURE: 82 MMHG | HEART RATE: 84 BPM | SYSTOLIC BLOOD PRESSURE: 128 MMHG | WEIGHT: 227 LBS | OXYGEN SATURATION: 96 %

## 2023-07-06 DIAGNOSIS — M81.0 AGE-RELATED OSTEOPOROSIS WITHOUT CURRENT PATHOLOGICAL FRACTURE: ICD-10-CM

## 2023-07-06 DIAGNOSIS — Z78.0 POSTMENOPAUSAL: ICD-10-CM

## 2023-07-06 DIAGNOSIS — N18.4 CKD (CHRONIC KIDNEY DISEASE) STAGE 4, GFR 15-29 ML/MIN (HCC): ICD-10-CM

## 2023-07-06 DIAGNOSIS — M25.472 PAIN AND SWELLING OF LEFT ANKLE: ICD-10-CM

## 2023-07-06 DIAGNOSIS — M25.572 PAIN AND SWELLING OF LEFT ANKLE: ICD-10-CM

## 2023-07-06 DIAGNOSIS — N28.1 RENAL CYST: Primary | ICD-10-CM
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
## 2023-07-06 DIAGNOSIS — E55.9 VITAMIN D DEFICIENCY: ICD-10-CM

## 2023-07-06 DIAGNOSIS — E21.3 HYPERPARATHYROIDISM (HCC): ICD-10-CM

## 2023-07-06 PROCEDURE — 73610 X-RAY EXAM OF ANKLE: CPT

## 2023-07-06 PROCEDURE — 99214 OFFICE O/P EST MOD 30 MIN: CPT | Performed by: NURSE PRACTITIONER

## 2023-07-06 NOTE — TELEPHONE ENCOUNTER
Called and spoke with patient. Patient aware Patient does have follow-up coming up on 7/25/2023 with Dr. Robbie Hare   Patient stated she might be  gaining weight not sure. Patient stated she doesn't check Bp at home, she is urinating well.   Instruct patient to maintain low-sodium diet no more than 2000 mg of salt per day-send information to her   However-please get BMP now Check electrolytes and kidney function-once this is reviewed further recommendation will be forth coming

## 2023-07-06 NOTE — TELEPHONE ENCOUNTER
Call from patient stating her feet and legs are swollen and it is hard and painful for her to walk. Patient also stated that she fell a couple of times, she's ok but has some bruises. She wanted to make the doctor aware of her legs and feet being swollen.

## 2023-07-06 NOTE — PROGRESS NOTES
[id number] Vanesa ROSALESNP    Assessment/Plan:      Diagnosis ICD-10-CM Associated Orders   1. Fall in home, subsequent encounter  W19. XXXD     Y92.009       2. Pain and swelling of left ankle  M25.572     M25.472       3. Postmenopausal  Z78.0         • Repeat left ankle Xray  • RICE:  Rest, ice 20minutes up to 4 times daily, use compression (ace wrap), and elevate extremity  • Use tylenol and tramadol for pain as needed  • Please schedule Mammogram and DXA  • Referral to rheumatology for osteoporosis management  No follow-ups on file. • Patient may call or return to office with any questions or concerns. ______________________________________________________________________  Subjective:     Patient ID: Xochilt Echevarria is a 66 y.o. female. Xochilt Echevarria  Chief Complaint   Patient presents with   • Edema     Bilateral lower extremity edema       Here for re-evaluation, last assessed by Dr. Stacy Peace on 6/15:    Poor historian, notes falling a couple weeks ago. .. tripped over the patio step taking the dog out, reports fell forward landing on all four extremities. Then tripped again a week later over the same patio step and landed on all fours. No head strike on either occurrence, denies LOC. Was able to get back up and put weight on BLE without issue. Went to ER for both falls. Xrays all negative. Notes left ankle swelling which is not improving. Taking tramadol prn for pain. Has not used ice/elevation/rest, continues to walk on left foot. Continues with PT for left knee injury post fall. Able to bear weight on ankle. No falls since. At [facility] yesterday registering for a kidney transplant:  Got blood work including CBC/BMP/Coags. Cr mildly improved at 2.13 with GFR 16.  K improved at 4.5  Lives with sister. Gait disturbance. Refuses walker  Hx OP per DXA 2021, nephology stopped fosamax due to CKD4. Consider prolia/othre options?                The following portions of the patient's history were reviewed and updated as appropriate: allergies, current medications, past family history, past medical history, past social history, past surgical history and problem list.    Review of Systems   Constitutional: Negative. Negative for activity change, appetite change, chills, fatigue and fever. HENT: Negative. Negative for congestion, ear pain, postnasal drip and sinus pain. Eyes: Negative. Respiratory: Negative. Negative for cough and shortness of breath. Cardiovascular: Positive for leg swelling. Negative for chest pain. Gastrointestinal: Negative. Negative for constipation and diarrhea. Endocrine: Negative. Genitourinary: Negative. Negative for dysuria. Musculoskeletal: Negative. Skin: Negative. Allergic/Immunologic: Negative. Negative for immunocompromised state. Neurological: Negative. Negative for dizziness and light-headedness. Hematological: Bruises/bleeds easily. Psychiatric/Behavioral: Negative. Objective:      Vitals:    07/06/23 1303   BP: 128/82   Pulse: 84   SpO2: 96%      Physical Exam  Vitals and nursing note reviewed. Constitutional:       Appearance: Normal appearance. HENT:      Head: Normocephalic and atraumatic. Right Ear: Tympanic membrane, ear canal and external ear normal.      Left Ear: Tympanic membrane, ear canal and external ear normal.      Nose: Nose normal.      Mouth/Throat:      Mouth: Mucous membranes are moist.      Pharynx: Oropharynx is clear. Eyes:      Extraocular Movements: Extraocular movements intact. Conjunctiva/sclera: Conjunctivae normal.      Pupils: Pupils are equal, round, and reactive to light. Cardiovascular:      Rate and Rhythm: Normal rate and regular rhythm. Pulses: Normal pulses. Heart sounds: Murmur heard. Pulmonary:      Effort: Pulmonary effort is normal.      Breath sounds: Normal breath sounds.    Abdominal:      General: Bowel sounds are normal. Palpations: Abdomen is soft. Musculoskeletal:         General: Normal range of motion. Cervical back: Normal range of motion and neck supple. Right lower leg: Edema present. Left lower leg: Edema present. Comments: +1 pitting BLE L>R. Negative homans     Skin:     General: Skin is warm and dry. Coloration: Skin is pale. Findings: Bruising present. Comments: Right forearm ecchymotic from fall   Neurological:      General: No focal deficit present. Mental Status: She is alert and oriented to person, place, and time. Psychiatric:         Mood and Affect: Mood normal.         Behavior: Behavior normal.         Thought Content: Thought content normal.         Judgment: Judgment normal.           Portions of the record may have been created with voice recognition software. Occasional wrong word or "sound alike" substitutions may have occurred due to the inherent limitations of voice recognition software. Please review the chart carefully and recognize, using context, where substitutions/typographical errors may have occurred.

## 2023-07-06 NOTE — TELEPHONE ENCOUNTER
Received a call from patient that she had lab work from [facility] yesterday and labs are in the system. Patient was told to go for labs but she just had them done. Please advise. Patient also told me her legs are swelling up and that has been going on for 2 weeks. Patient is also tired.

## 2023-07-10 ENCOUNTER — TELEPHONE (OUTPATIENT)
Dept: FAMILY MEDICINE CLINIC | Facility: HOSPITAL | Age: 63
End: 2023-07-10

## 2023-07-10 NOTE — TELEPHONE ENCOUNTER
Patient called and we went over the voicemail. She stated she went to her PCP and they said that her GFR is at 15 not 26. I advised I would find out if it was a miss type. Patient is also wondering what to do about her swollen feet. Please advise.

## 2023-07-10 NOTE — TELEPHONE ENCOUNTER
I tried calling her but it disconnected. Have her check her bp at home if bp low can consider holding amlodipine. Have her continue leg elevation and low sodium diet and we will discuss more at her follow up appointment. If edema is worsening and or sob have her get evaluated in ED before she sees me.  thanks

## 2023-07-10 NOTE — TELEPHONE ENCOUNTER
Called patient and left a voicemail stating the following information:    Dr. Robbie Hare reviewed her blood work and her creatinine is stable at 2.1, GFR is 26. Please  continue to monitor her bp at home and we will discuss in detail at her upcoming appointment. I asked the patient please call back with further questions.

## 2023-07-10 NOTE — TELEPHONE ENCOUNTER
Please let her know that I reviewed her blood work and her creatinine is stable at 2.1, GFR is 26 have her continue to monitor her bp at home and we will discuss in detail at her upcoming appointment. Thanks.

## 2023-07-10 NOTE — TELEPHONE ENCOUNTER
I spoke to jones she is aware of instructions. No further questions at this time. New cell number.  [phone]

## 2023-07-11 ENCOUNTER — APPOINTMENT (OUTPATIENT)
Dept: RADIOLOGY | Facility: HOSPITAL | Age: 63
DRG: 189 | End: 2023-07-11
Payer: MEDICARE

## 2023-07-11 ENCOUNTER — APPOINTMENT (EMERGENCY)
Dept: RADIOLOGY | Facility: HOSPITAL | Age: 63
DRG: 189 | End: 2023-07-11
Payer: MEDICARE

## 2023-07-11 ENCOUNTER — APPOINTMENT (EMERGENCY)
Dept: NON INVASIVE DIAGNOSTICS | Facility: HOSPITAL | Age: 63
DRG: 189 | End: 2023-07-11
Payer: MEDICARE

## 2023-07-11 ENCOUNTER — HOSPITAL ENCOUNTER (INPATIENT)
Facility: HOSPITAL | Age: 63
LOS: 2 days | Discharge: HOME | DRG: 189 | End: 2023-07-14
Attending: EMERGENCY MEDICINE | Admitting: HOSPITALIST
Payer: MEDICARE

## 2023-07-11 DIAGNOSIS — R79.89 ELEVATED D-DIMER: Primary | ICD-10-CM

## 2023-07-11 DIAGNOSIS — I26.99 PULMONARY EMBOLISM WITH INFARCTION (HCC): ICD-10-CM

## 2023-07-11 DIAGNOSIS — M79.605 BILATERAL LEG PAIN: ICD-10-CM

## 2023-07-11 DIAGNOSIS — J84.9 ILD (INTERSTITIAL LUNG DISEASE) (HCC): ICD-10-CM

## 2023-07-11 DIAGNOSIS — N18.4 CKD (CHRONIC KIDNEY DISEASE) STAGE 4, GFR 15-29 ML/MIN (HCC): ICD-10-CM

## 2023-07-11 DIAGNOSIS — M79.604 BILATERAL LEG PAIN: ICD-10-CM

## 2023-07-11 PROBLEM — M79.89 LEG SWELLING: Status: ACTIVE | Noted: 2023-07-11

## 2023-07-11 PROBLEM — R06.02 SHORTNESS OF BREATH: Status: ACTIVE | Noted: 2023-07-11

## 2023-07-11 PROBLEM — E66.9 OBESITY (BMI 30-39.9): Status: ACTIVE | Noted: 2023-07-11

## 2023-07-11 LAB
2HR DELTA HS TROPONIN: 1 NG/L
4HR DELTA HS TROPONIN: -1 NG/L
ALBUMIN SERPL BCP-MCNC: 3.9 G/DL (ref 3.5–5)
ALP SERPL-CCNC: 186 U/L (ref 34–104)
ALT SERPL W P-5'-P-CCNC: 14 U/L (ref 7–52)
ANION GAP SERPL CALCULATED.3IONS-SCNC: 7 MMOL/L
AST SERPL W P-5'-P-CCNC: 20 U/L (ref 13–39)
BACTERIA UR QL AUTO: ABNORMAL /HPF
BASOPHILS # BLD AUTO: 0.07 THOUSANDS/ÂΜL (ref 0–0.1)
BASOPHILS NFR BLD AUTO: 1 % (ref 0–1)
BILIRUB SERPL-MCNC: 0.28 MG/DL (ref 0.2–1)
BILIRUB UR QL STRIP: NEGATIVE
BNP SERPL-MCNC: 56 PG/ML (ref 0–100)
BUN SERPL-MCNC: 30 MG/DL (ref 5–25)
CALCIUM SERPL-MCNC: 9.3 MG/DL (ref 8.4–10.2)
CARDIAC TROPONIN I PNL SERPL HS: 4 NG/L
CARDIAC TROPONIN I PNL SERPL HS: 5 NG/L
CARDIAC TROPONIN I PNL SERPL HS: 6 NG/L
CHLORIDE SERPL-SCNC: 108 MMOL/L (ref 96–108)
CLARITY UR: CLEAR
CO2 SERPL-SCNC: 23 MMOL/L (ref 21–32)
COLOR UR: COLORLESS
CREAT SERPL-MCNC: 2.36 MG/DL (ref 0.6–1.3)
D DIMER PPP FEU-MCNC: 1.24 UG/ML FEU
EOSINOPHIL # BLD AUTO: 0.39 THOUSAND/ÂΜL (ref 0–0.61)
EOSINOPHIL NFR BLD AUTO: 6 % (ref 0–6)
ERYTHROCYTE [DISTWIDTH] IN BLOOD BY AUTOMATED COUNT: 12.4 % (ref 11.6–15.1)
GFR SERPL CREATININE-BSD FRML MDRD: 21 ML/MIN/1.73SQ M
GLUCOSE SERPL-MCNC: 94 MG/DL (ref 65–140)
GLUCOSE UR STRIP-MCNC: NEGATIVE MG/DL
HCT VFR BLD AUTO: 34.8 % (ref 34.8–46.1)
HGB BLD-MCNC: 10.7 G/DL (ref 11.5–15.4)
HGB UR QL STRIP.AUTO: NEGATIVE
IMM GRANULOCYTES # BLD AUTO: 0.02 THOUSAND/UL (ref 0–0.2)
IMM GRANULOCYTES NFR BLD AUTO: 0 % (ref 0–2)
KETONES UR STRIP-MCNC: NEGATIVE MG/DL
LEUKOCYTE ESTERASE UR QL STRIP: ABNORMAL
LYMPHOCYTES # BLD AUTO: 1.22 THOUSANDS/ÂΜL (ref 0.6–4.47)
LYMPHOCYTES NFR BLD AUTO: 20 % (ref 14–44)
MCH RBC QN AUTO: 31.2 PG (ref 26.8–34.3)
MCHC RBC AUTO-ENTMCNC: 30.7 G/DL (ref 31.4–37.4)
MCV RBC AUTO: 102 FL (ref 82–98)
MONOCYTES # BLD AUTO: 0.52 THOUSAND/ÂΜL (ref 0.17–1.22)
MONOCYTES NFR BLD AUTO: 9 % (ref 4–12)
NEUTROPHILS # BLD AUTO: 3.9 THOUSANDS/ÂΜL (ref 1.85–7.62)
NEUTS SEG NFR BLD AUTO: 64 % (ref 43–75)
NITRITE UR QL STRIP: NEGATIVE
NON-SQ EPI CELLS URNS QL MICRO: ABNORMAL /HPF
NRBC BLD AUTO-RTO: 0 /100 WBCS
OTHER STN SPEC: ABNORMAL
PH UR STRIP.AUTO: 6 [PH]
PLATELET # BLD AUTO: 231 THOUSANDS/UL (ref 149–390)
PMV BLD AUTO: 10.5 FL (ref 8.9–12.7)
POTASSIUM SERPL-SCNC: 5 MMOL/L (ref 3.5–5.3)
PROT SERPL-MCNC: 6.7 G/DL (ref 6.4–8.4)
PROT UR STRIP-MCNC: NEGATIVE MG/DL
RBC # BLD AUTO: 3.43 MILLION/UL (ref 3.81–5.12)
RBC #/AREA URNS AUTO: ABNORMAL /HPF
SODIUM SERPL-SCNC: 138 MMOL/L (ref 135–147)
SP GR UR STRIP.AUTO: 1.01 (ref 1–1.03)
UROBILINOGEN UR STRIP-ACNC: <2 MG/DL
WBC # BLD AUTO: 6.12 THOUSAND/UL (ref 4.31–10.16)
WBC #/AREA URNS AUTO: ABNORMAL /HPF

## 2023-07-11 PROCEDURE — 99223 1ST HOSP IP/OBS HIGH 75: CPT | Performed by: INTERNAL MEDICINE

## 2023-07-11 PROCEDURE — 71046 X-RAY EXAM CHEST 2 VIEWS: CPT

## 2023-07-11 PROCEDURE — 83880 ASSAY OF NATRIURETIC PEPTIDE: CPT

## 2023-07-11 PROCEDURE — 85025 COMPLETE CBC W/AUTO DIFF WBC: CPT

## 2023-07-11 PROCEDURE — 99285 EMERGENCY DEPT VISIT HI MDM: CPT | Performed by: EMERGENCY MEDICINE

## 2023-07-11 PROCEDURE — 81001 URINALYSIS AUTO W/SCOPE: CPT

## 2023-07-11 PROCEDURE — 80053 COMPREHEN METABOLIC PANEL: CPT

## 2023-07-11 PROCEDURE — 36415 COLL VENOUS BLD VENIPUNCTURE: CPT

## 2023-07-11 PROCEDURE — 85379 FIBRIN DEGRADATION QUANT: CPT

## 2023-07-11 PROCEDURE — 93005 ELECTROCARDIOGRAM TRACING: CPT

## 2023-07-11 PROCEDURE — 99285 EMERGENCY DEPT VISIT HI MDM: CPT

## 2023-07-11 PROCEDURE — 93970 EXTREMITY STUDY: CPT

## 2023-07-11 PROCEDURE — 73564 X-RAY EXAM KNEE 4 OR MORE: CPT

## 2023-07-11 PROCEDURE — 84484 ASSAY OF TROPONIN QUANT: CPT

## 2023-07-11 PROCEDURE — 93970 EXTREMITY STUDY: CPT | Performed by: SURGERY

## 2023-07-11 RX ORDER — QUETIAPINE FUMARATE 300 MG/1
300 TABLET, FILM COATED ORAL DAILY
Status: DISCONTINUED | OUTPATIENT
Start: 2023-07-12 | End: 2023-07-14 | Stop reason: HOSPADM

## 2023-07-11 RX ORDER — AMILORIDE HYDROCHLORIDE 5 MG/1
5 TABLET ORAL 2 TIMES DAILY
Status: DISCONTINUED | OUTPATIENT
Start: 2023-07-11 | End: 2023-07-14 | Stop reason: HOSPADM

## 2023-07-11 RX ORDER — MELATONIN
5000 DAILY
Status: DISCONTINUED | OUTPATIENT
Start: 2023-07-12 | End: 2023-07-14 | Stop reason: HOSPADM

## 2023-07-11 RX ORDER — ACETAMINOPHEN 325 MG/1
650 TABLET ORAL EVERY 6 HOURS PRN
Status: DISCONTINUED | OUTPATIENT
Start: 2023-07-11 | End: 2023-07-14 | Stop reason: HOSPADM

## 2023-07-11 RX ORDER — ACETAMINOPHEN 325 MG/1
975 TABLET ORAL ONCE
Status: COMPLETED | OUTPATIENT
Start: 2023-07-11 | End: 2023-07-11

## 2023-07-11 RX ORDER — CLONAZEPAM 0.5 MG/1
0.5 TABLET ORAL 3 TIMES DAILY
Status: DISCONTINUED | OUTPATIENT
Start: 2023-07-11 | End: 2023-07-14 | Stop reason: HOSPADM

## 2023-07-11 RX ORDER — ASPIRIN 81 MG/1
81 TABLET, CHEWABLE ORAL DAILY
Status: DISCONTINUED | OUTPATIENT
Start: 2023-07-12 | End: 2023-07-14 | Stop reason: HOSPADM

## 2023-07-11 RX ORDER — BUDESONIDE AND FORMOTEROL FUMARATE DIHYDRATE 160; 4.5 UG/1; UG/1
2 AEROSOL RESPIRATORY (INHALATION)
Status: DISCONTINUED | OUTPATIENT
Start: 2023-07-12 | End: 2023-07-14 | Stop reason: HOSPADM

## 2023-07-11 RX ORDER — FLUVOXAMINE MALEATE 100 MG
200 TABLET ORAL
Status: DISCONTINUED | OUTPATIENT
Start: 2023-07-11 | End: 2023-07-14 | Stop reason: HOSPADM

## 2023-07-11 RX ORDER — ALBUTEROL SULFATE 90 UG/1
2 AEROSOL, METERED RESPIRATORY (INHALATION) EVERY 6 HOURS PRN
Status: DISCONTINUED | OUTPATIENT
Start: 2023-07-11 | End: 2023-07-14 | Stop reason: HOSPADM

## 2023-07-11 RX ORDER — PANTOPRAZOLE SODIUM 40 MG/1
40 TABLET, DELAYED RELEASE ORAL
Status: DISCONTINUED | OUTPATIENT
Start: 2023-07-12 | End: 2023-07-14 | Stop reason: HOSPADM

## 2023-07-11 RX ORDER — CARVEDILOL 12.5 MG/1
25 TABLET ORAL 2 TIMES DAILY WITH MEALS
Status: DISCONTINUED | OUTPATIENT
Start: 2023-07-12 | End: 2023-07-14 | Stop reason: HOSPADM

## 2023-07-11 RX ORDER — ATORVASTATIN CALCIUM 20 MG/1
20 TABLET, FILM COATED ORAL
Status: DISCONTINUED | OUTPATIENT
Start: 2023-07-12 | End: 2023-07-14 | Stop reason: HOSPADM

## 2023-07-11 RX ORDER — HEPARIN SODIUM 5000 [USP'U]/ML
5000 INJECTION, SOLUTION INTRAVENOUS; SUBCUTANEOUS EVERY 8 HOURS SCHEDULED
Status: DISCONTINUED | OUTPATIENT
Start: 2023-07-11 | End: 2023-07-14 | Stop reason: HOSPADM

## 2023-07-11 RX ORDER — LAMOTRIGINE 100 MG/1
200 TABLET ORAL 2 TIMES DAILY
Status: DISCONTINUED | OUTPATIENT
Start: 2023-07-11 | End: 2023-07-14 | Stop reason: HOSPADM

## 2023-07-11 RX ORDER — FLUVOXAMINE MALEATE 100 MG
100 TABLET ORAL DAILY
Status: DISCONTINUED | OUTPATIENT
Start: 2023-07-12 | End: 2023-07-14 | Stop reason: HOSPADM

## 2023-07-11 RX ORDER — AMLODIPINE BESYLATE 5 MG/1
5 TABLET ORAL DAILY
Status: DISCONTINUED | OUTPATIENT
Start: 2023-07-12 | End: 2023-07-14 | Stop reason: HOSPADM

## 2023-07-11 RX ORDER — TRAMADOL HYDROCHLORIDE 50 MG/1
50 TABLET ORAL EVERY 12 HOURS PRN
Status: DISCONTINUED | OUTPATIENT
Start: 2023-07-11 | End: 2023-07-14 | Stop reason: HOSPADM

## 2023-07-11 RX ADMIN — AMILORIDE HYDROCLORIDE 5 MG: 5 TABLET ORAL at 22:44

## 2023-07-11 RX ADMIN — HEPARIN SODIUM 5000 UNITS: 5000 INJECTION INTRAVENOUS; SUBCUTANEOUS at 22:46

## 2023-07-11 RX ADMIN — FLUVOXAMINE MALEATE 200 MG: 100 TABLET ORAL at 22:45

## 2023-07-11 RX ADMIN — CLONAZEPAM 0.5 MG: 0.5 TABLET ORAL at 22:44

## 2023-07-11 RX ADMIN — QUETIAPINE 500 MG: 200 TABLET ORAL at 22:43

## 2023-07-11 RX ADMIN — LAMOTRIGINE 200 MG: 100 TABLET ORAL at 22:44

## 2023-07-11 RX ADMIN — ACETAMINOPHEN 975 MG: 325 TABLET, FILM COATED ORAL at 18:12

## 2023-07-11 NOTE — ASSESSMENT & PLAN NOTE
· Swelling in left lower extremity. No edema noted on exam.   · Pt reports she fell onto her knees in the past week due to tripping over dog. Denies head strike or LOC. Swelling started shortly after. · Pt is to use walker but is noncompliant  · Xray of left ankle performed outpatient on 7/6. No fracture noted   · BNP WNL   · Duplex study negative   · Recommend elevating extremities and icing when able   · Order xray of bilateral knees.    · PT/OT   · Continue to monitor

## 2023-07-11 NOTE — ASSESSMENT & PLAN NOTE
Lab Results   Component Value Date    EGFR 21 07/11/2023    EGFR 19 04/22/2023    EGFR 18 02/20/2023    CREATININE 2.36 (H) 07/11/2023    CREATININE 2.59 (H) 04/22/2023    CREATININE 2.73 (H) 02/20/2023   · Creatinine 2.36 (GFR 21) on admission  · Baseline appears to be about 2.3-2.7  · Continue to monitor

## 2023-07-11 NOTE — ASSESSMENT & PLAN NOTE
· Presents from home due to dyspnea on exertion and swelling in her left lower extremity over the past 3-4 days  · Patient on RA without tachycardia or tachypnea  · BNP and troponin WNL  · D-dimer elevated at 1.24  · Unable to have CTA chest due to CKD 4. · Bilateral duplex study performed. Negative for DVTs  · ER unable to schedule VQ scan.   Admitted under observation for V/Q.   · Due to patient being hemodynamically stable hold off on starting anticoagulation at this time

## 2023-07-11 NOTE — ED PROVIDER NOTES
History  Chief Complaint   Patient presents with   • Leg Swelling     Patient c/o bilateral ankle and leg swelling that started three days ago. Patient has a history of kidney disease. This is a 65 y/o female with PMH stage 5 CKD not on dialysis, lumbar radiculopathy, osteoarthritis, bipolar disorder, who presents to the ER with bilateral leg swelling since . Patient reports she was seen here about a month ago after a fall where she fell on her knees. Then saw her PCP 5 days later. Patient states she has pain in both of her legs and noticed on  there were both swollen as well. She also admits to some shortness of breath with activity that is new. She denies any redness, warmth, red streaking, fevers, chest pain, changes in urination, numbness, tingling, weakness. Denies any recent travel, hospitalizations, surgeries, history of blood clots or history of cancer. History provided by:  Patient   used: No        Prior to Admission Medications   Prescriptions Last Dose Informant Patient Reported? Taking?    AMILoride 5 mg tablet 2023 Self No Yes   Sig: Take 1 tablet (5 mg total) by mouth 2 (two) times a day   QUEtiapine (SEROquel) 100 mg tablet 7/10/2023  No Yes   Sig: Quetiapine 100mg : 1 tablet + 400mg po at night   QUEtiapine (SEROquel) 300 mg tablet 2023  No Yes   Sig: Quetiapine 300m tablet po  in morning   QUEtiapine (SEROquel) 400 MG tablet 7/10/2023  No Yes   Sig: Quetiapine 400m tablet + 100mg tablet po at night   acetaminophen (TYLENOL) 650 mg CR tablet Past Week Self No Yes   Sig: Take 1 tablet (650 mg total) by mouth every 8 (eight) hours as needed for mild pain   Patient taking differently: Take 500 mg by mouth every 8 (eight) hours as needed for mild pain   albuterol (Ventolin HFA) 90 mcg/act inhaler Past Week  No Yes   Sig: Inhale 2 puffs every 6 (six) hours as needed for wheezing or shortness of breath   amLODIPine (NORVASC) 5 mg tablet 2023 Self No Yes   Sig: Take 1 tablet (5 mg total) by mouth daily   aspirin 81 mg chewable tablet 2023 Self No Yes   Sig: Chew 1 tablet (81 mg total) daily   budesonide-formoterol (Symbicort) 160-4.5 mcg/act inhaler Past Week  No Yes   Sig: Inhale 2 puffs 2 (two) times a day Rinse mouth after use. Patient taking differently: Inhale 2 puffs 2 (two) times a day as needed (sob) Rinse mouth after use.    carvedilol (COREG) 25 mg tablet 2023  No Yes   Sig: TAKE 1 TABLET BY MOUTH  TWICE DAILY WITH MEALS   cholecalciferol (VITAMIN D3) 1,000 units tablet More than a month Self No No   Sig: Take 5 tablets (5,000 Units total) by mouth daily   clonazePAM (KlonoPIN) 0.5 mg tablet 2023  No Yes   Sig: Take 1 tablet (0.5 mg total) by mouth 4 (four) times a day   Patient taking differently: Take 0.5 mg by mouth 3 (three) times a day   fluocinonide (LIDEX) 0.05 % ointment Not Taking  No No   Sig: Apply topically 2 (two) times a day   Patient not taking: Reported on 2023   fluvoxaMINE (LUVOX) 100 mg tablet 2023  No Yes   Sig: Fluvoxamine 100m tablet in the morning ; 2 tablets at night   lamoTRIgine (LaMICtal) 200 MG tablet 2023  No Yes   Sig: TAKE 1 TABLET BY MOUTH IN THE  MORNING AND 1 TABLET AT NIGHT   omeprazole (PriLOSEC) 40 MG capsule 2023  No Yes   Sig: TAKE 1 CAPSULE BY MOUTH  DAILY BEFORE BREAKFAST   ondansetron (ZOFRAN) 4 mg tablet 7/10/2023  No Yes   Sig: Take 1 tablet (4 mg total) by mouth every 8 (eight) hours as needed for nausea or vomiting   rosuvastatin (CRESTOR) 20 MG tablet 2023  No Yes   Sig: TAKE 1 TABLET BY MOUTH IN  THE EVENING   traMADol (Ultram) 50 mg tablet Past Week  No Yes   Sig: Take 1 tablet (50 mg total) by mouth every 12 (twelve) hours as needed for moderate pain      Facility-Administered Medications: None       Past Medical History:   Diagnosis Date   • Anxiety    • Anxiety disorder    • Arthritis    • At risk for falls    • Bipolar 2 disorder (HCC)    • Chronic back pain    • Chronic kidney disease    • Closed fracture of distal end of right fibula with routine healing 2020   • COVID-19     in 2021   • CVA (cerebral vascular accident) [facility])     noted on MRI in the past   • Depression    • GERD (gastroesophageal reflux disease)    • Hypercholesteremia    • Hypernatremia    • Hypertension    • Hypokalemia    • Idiopathic chronic pancreatitis ([address]) 2018   • Intervertebral disc disorder with radiculopathy of lumbosacral region     resolved: 2015   • Kidney disease    • Kidney disease    • Limb alert care status     LUE-fistula   • Panic attacks    • Pericardial effusion    • PONV (postoperative nausea and vomiting)    • Psychiatric problem    • Radiculitis     resolved: 2015   • Secondary renal hyperparathyroidism ([address])    • Stroke [facility])    • Vitamin D deficiency        Past Surgical History:   Procedure Laterality Date   • BUNIONECTOMY      Left foot    • CAST APPLICATION Right     Procedure: Application short-arm thumb spica splint;  Surgeon: Rosetta Wells MD;  Location:  MAIN OR;  Service: Orthopedics   • COLON SURGERY     • COLONOSCOPY  2021   • DILATION AND CURETTAGE OF UTERUS     • INDUCED       surgically induced   • DC ARTERIOVENOUS ANASTOMOSIS OPEN DIRECT Left 2019    Procedure: CREATION FISTULA ARTERIOVENOUS (AV) left wrists possible left upper;  Surgeon: Peyton Clayton MD;  Location: [facility] MAIN OR;  Service: Vascular   • DC CORRJ [address] W/SESMDC W/DIST Seabron Rily Left 2019    Procedure: Mack Handley;  Surgeon: Jorge Correa DPM;  Location: [facility];  Service: Podiatry   • DC CORRJ HALLUX VALGUS W/SESMDC W/DIST [place] Rily Right 8/3/2020    Procedure: Princess Alegria;  Surgeon: Jorge Correa DPM;  Location: [facility];  Service: Podiatry   • DC CORRJ HALLUX VALGUS W/SESMDC W/PROX PHLNX OSTEOT Right 2021    Procedure: Tayler Blackburn, right tameka osteotomy and 2nd claw toe correction;  Surgeon: Adilene Thomas MD;  Location: [facility];  Service: Orthopedics   • AL ERCP DX COLLECTION SPECIMEN BRUSHING/WASHING N/A 4/11/2018    Procedure: ENDOSCOPIC RETROGRADE CHOLANGIOPANCREATOGRAPHY (ERCP); Surgeon: Alfredo Jo MD;  Location: [facility];  Service: Gastroenterology   • AL LAPAROSCOPY PROCTOPEXY PROLAPSE N/A 7/13/2018    Procedure: ROBOTIC SIGMOID RESECTION / RECTOPEXY;  Surgeon: Alan Khalil MD;  Location: [facility];  Service: Colorectal   • AL OPEN TREATMENT RADIAL SHAFT FRACTURE Right 10/11/2021    Procedure: OPEN REDUCTION W/ INTERNAL FIXATION (ORIF) RADIUS (WRIST), RIGHT DISTAL;  Surgeon: Emilie Mesa MD;  Location: [facility];  Service: Orthopedics   • AL REMOVAL IMPLANT DEEP Right 6/23/2022    Procedure: Removal of hardware volar aspect right distal radius (distal radial plate and screws);   Surgeon: Claudia Galvan MD;  [facility]: [facility];  Service: Orthopedics   • AL SIGMOIDOSCOPY FLX DX W/COLLJ SPEC BR/WA IF PFRMD N/A 7/13/2018    Procedure: Hiedi Pichardo;  Surgeon: Alan Khalil MD;  Location: [facility];  Service: Colorectal   • AL TR TDN RESTORE INTRNSC FUNCJ ALL 4 FNGRS Right 6/23/2022    Procedure: Right ring finger flexor digitorum superficialis to flexor pollicis longus tendon transfer;  Surgeon: Claudia Galvan MD;  [facility];  Service: Orthopedics   • TUBAL LIGATION Bilateral 1997   • [address] THYROID BIOPSY  7/30/2019       Family History   Problem Relation Age of Onset   • Bipolar disorder Mother    • Mental illness Mother         depression   • Stroke Mother    • Dementia Mother    • Colon polyps Mother    • Heart disease Father    • Hypertension Father    • Diabetes Father    • Other Family         Back disorder   • Diabetes Family    • Heart disease Family    • Hypertension Family    • Stroke Family    • Thyroid disease Family    • Breast cancer Paternal Grandmother         age unknown   • Breast cancer Paternal Aunt age unknown   • Breast cancer Maternal Aunt         age unknown   • Mental illness Sister    • Colon polyps Sister    • Mental illness Sister    • Heart disease Sister    • No Known Problems Sister    • Breast cancer Sister 76   • Other Son         pituitary tumor   • Hypertension Son    • Obesity Son    • No Known Problems Son    • No Known Problems Maternal Grandmother    • No Known Problems Maternal Grandfather    • No Known Problems Paternal Grandfather    • Breast cancer Paternal Aunt         age unknown   • Substance Abuse Neg Hx         neg fam hx   • Colon cancer Neg Hx      I have reviewed and agree with the history as documented. E-Cigarette/Vaping   • E-Cigarette Use Never User      E-Cigarette/Vaping Substances   • Nicotine No    • THC No    • CBD No    • Flavoring No    • Other No    • Unknown No      Social History     Tobacco Use   • Smoking status: Never   • Smokeless tobacco: Never   Vaping Use   • Vaping Use: Never used   Substance Use Topics   • Alcohol use: Never   • Drug use: Not Currently       Review of Systems   Constitutional: Negative for chills and fever. Respiratory: Positive for shortness of breath. Negative for chest tightness. Cardiovascular: Positive for leg swelling. Negative for chest pain. Skin: Negative for color change. Neurological: Negative for dizziness, weakness, light-headedness, numbness and headaches. Psychiatric/Behavioral: Negative for behavioral problems and sleep disturbance. All other systems reviewed and are negative. Physical Exam  Physical Exam  Vitals and nursing note reviewed. Constitutional:       General: She is awake. Appearance: Normal appearance. She is well-developed. HENT:      Head: Normocephalic and atraumatic. Right Ear: External ear normal.      Left Ear: External ear normal.      Nose: Nose normal.   Eyes:      General: No scleral icterus. Extraocular Movements: Extraocular movements intact. Cardiovascular:      Rate and Rhythm: Normal rate and regular rhythm. Pulses:           Dorsalis pedis pulses are 2+ on the right side and 2+ on the left side. Posterior tibial pulses are 2+ on the right side and 2+ on the left side. Heart sounds: Normal heart sounds, S1 normal and S2 normal. No murmur heard. No gallop. Comments:  No redness or warmth noted of either leg. LUE radial fistula noted. + thrill and bruit  Pulmonary:      Effort: Pulmonary effort is normal.      Breath sounds: Normal breath sounds. No wheezing, rhonchi or rales. Musculoskeletal:         General: Normal range of motion. Cervical back: Normal range of motion. Right lower leg: Edema present. Left lower leg: Edema present. Skin:     General: Skin is warm and dry. Neurological:      General: No focal deficit present. Mental Status: She is alert. Psychiatric:         Attention and Perception: Attention and perception normal.         Mood and Affect: Mood normal.         Behavior: Behavior normal. Behavior is cooperative.          Vital Signs  ED Triage Vitals   Temperature Pulse Respirations Blood Pressure SpO2   07/11/23 1433 07/11/23 1433 07/11/23 1433 07/11/23 1433 07/11/23 1433   97.5 °F (36.4 °C) 83 20 153/81 98 %      Temp src Heart Rate Source Patient Position - Orthostatic VS BP Location FiO2 (%)   -- 07/11/23 1845 -- -- --    Monitor         Pain Score       07/11/23 1433       7           Vitals:    07/11/23 1433 07/11/23 1845   BP: 153/81 142/74   Pulse: 83 80         Visual Acuity      ED Medications  Medications   albuterol (PROVENTIL HFA,VENTOLIN HFA) inhaler 2 puff (has no administration in time range)   AMILoride tablet 5 mg (5 mg Oral Given 7/11/23 2916)   amLODIPine (NORVASC) tablet 5 mg (has no administration in time range)   aspirin chewable tablet 81 mg (has no administration in time range)   carvedilol (COREG) tablet 25 mg (has no administration in time range) cholecalciferol (VITAMIN D3) tablet 5,000 Units (has no administration in time range)   budesonide-formoterol (SYMBICORT) 160-4.5 mcg/act inhaler 2 puff (has no administration in time range)   clonazePAM (KlonoPIN) tablet 0.5 mg (0.5 mg Oral Given 7/11/23 2244)   fluvoxaMINE (LUVOX) tablet 200 mg (200 mg Oral Given 7/11/23 2245)   fluvoxaMINE (LUVOX) tablet 100 mg (has no administration in time range)   lamoTRIgine (LaMICtal) tablet 200 mg (200 mg Oral Given 7/11/23 2244)   pantoprazole (PROTONIX) EC tablet 40 mg (has no administration in time range)   QUEtiapine (SEROquel) tablet 300 mg (has no administration in time range)   QUEtiapine (SEROquel) tablet 500 mg (500 mg Oral Given 7/11/23 2243)   atorvastatin (LIPITOR) tablet 20 mg (has no administration in time range)   traMADol (ULTRAM) tablet 50 mg (has no administration in time range)   acetaminophen (TYLENOL) tablet 650 mg (has no administration in time range)   heparin (porcine) subcutaneous injection 5,000 Units (5,000 Units Subcutaneous Given 7/11/23 2246)   acetaminophen (TYLENOL) tablet 975 mg (975 mg Oral Given 7/11/23 1812)       Diagnostic Studies  Results Reviewed     Procedure Component Value Units Date/Time    Platelet count [[id number]]     Lab Status: No result Specimen: Blood     HS Troponin I 4hr [[id number]]  (Normal) Collected: 07/11/23 1941    Lab Status: Final result Specimen: Blood from Hand, Right Updated: 07/11/23 2011     hs TnI 4hr 4 ng/L      Delta 4hr hsTnI -1 ng/L     HS Troponin I 2hr [[id number]]  (Normal) Collected: 07/11/23 1815    Lab Status: Final result Specimen: Blood from Hand, Right Updated: 07/11/23 1842     hs TnI 2hr 6 ng/L      Delta 2hr hsTnI 1 ng/L     Urine Microscopic [[id number]]  (Abnormal) Collected: 07/11/23 1627    Lab Status: Final result Specimen: Urine, Clean Catch Updated: 07/11/23 1744     RBC, UA None Seen /hpf      WBC, UA 4-10 /hpf      Epithelial Cells Occasional /hpf      Bacteria, UA Occasional /hpf OTHER OBSERVATIONS WBCs Clumped    UA w Reflex to Microscopic w Reflex to Culture [[id number]]  (Abnormal) Collected: 07/11/23 1627    Lab Status: Final result Specimen: Urine, Clean Catch Updated: 07/11/23 1710     Color, UA Colorless     Clarity, UA Clear     Specific Gravity, UA 1.010     pH, UA 6.0     Leukocytes, UA Small     Nitrite, UA Negative     Protein, UA Negative mg/dl      Glucose, UA Negative mg/dl      Ketones, UA Negative mg/dl      Urobilinogen, UA <2.0 mg/dl      Bilirubin, UA Negative     Occult Blood, UA Negative    HS Troponin 0hr (reflex protocol) [[id number]]  (Normal) Collected: 07/11/23 1546    Lab Status: Final result Specimen: Blood from Hand, Right Updated: 07/11/23 1622     hs TnI 0hr 5 ng/L     Comprehensive metabolic panel [[id number]]  (Abnormal) Collected: 07/11/23 1546    Lab Status: Final result Specimen: Blood from Hand, Right Updated: 07/11/23 1618     Sodium 138 mmol/L      Potassium 5.0 mmol/L      Chloride 108 mmol/L      CO2 23 mmol/L      ANION GAP 7 mmol/L      BUN 30 mg/dL      Creatinine 2.36 mg/dL      Glucose 94 mg/dL      Calcium 9.3 mg/dL      AST 20 U/L      ALT 14 U/L      Alkaline Phosphatase 186 U/L      Total Protein 6.7 g/dL      Albumin 3.9 g/dL      Total Bilirubin 0.28 mg/dL      eGFR 21 ml/min/1.73sq m     Narrative:      [facility] (CKD):   •  Stage 1 with normal or high GFR (GFR > 90 mL/min/1.73 square meters)  •  Stage 2 Mild CKD (GFR = 60-89 mL/min/1.73 square meters)  •  Stage 3A Moderate CKD (GFR = 45-59 mL/min/1.73 square meters)  •  Stage 3B Moderate CKD (GFR = 30-44 mL/min/1.73 square meters)  •  Stage 4 Severe CKD (GFR = 15-29 mL/min/1.73 square meters)  •  Stage 5 End Stage CKD (GFR <15 mL/min/1.73 square meters)  Note: GFR calculation is accurate only with a steady state creatinine    B-Type Natriuretic Peptide(BNP) [[id number]]  (Normal) Collected: 07/11/23 1505    Lab Status: Final result Specimen: Blood from Arm, Right Updated: 07/11/23 1548     BNP 56 pg/mL     D-Dimer [[id number]]  (Abnormal) Collected: 07/11/23 1505    Lab Status: Final result Specimen: Blood from Arm, Right Updated: 07/11/23 1527     D-Dimer, Quant 1.24 ug/ml FEU     Narrative: In the evaluation for possible pulmonary embolism, in the appropriate (Well's Score of 4 or less) patient, the age adjusted d-dimer cutoff for this patient can be calculated as:    Age x 0.01 (in ug/mL) for Age-adjusted D-dimer exclusion threshold for a patient over 50 years.     CBC and differential [[id number]]  (Abnormal) Collected: 07/11/23 1505    Lab Status: Final result Specimen: Blood from Arm, Right Updated: 07/11/23 1512     WBC 6.12 Thousand/uL      RBC 3.43 Million/uL      Hemoglobin 10.7 g/dL      Hematocrit 34.8 %       fL      MCH 31.2 pg      MCHC 30.7 g/dL      RDW 12.4 %      MPV 10.5 fL      Platelets 871 Thousands/uL      nRBC 0 /100 WBCs      Neutrophils Relative 64 %      Immat GRANS % 0 %      Lymphocytes Relative 20 %      Monocytes Relative 9 %      Eosinophils Relative 6 %      Basophils Relative 1 %      Neutrophils Absolute 3.90 Thousands/µL      Immature Grans Absolute 0.02 Thousand/uL      Lymphocytes Absolute 1.22 Thousands/µL      Monocytes Absolute 0.52 Thousand/µL      Eosinophils Absolute 0.39 Thousand/µL      Basophils Absolute 0.07 Thousands/µL                  VAS lower limb venous duplex study, complete bilateral   Final Result by Lona Sandy MD (07/11 2045)      XR chest 2 views    (Results Pending)   XR knee 4+ vw left injury    (Results Pending)   XR knee 4+ vw right injury    (Results Pending)              Procedures  ECG 12 Lead Documentation Only    Date/Time: 7/11/2023 3:53 PM    Performed by: Rina Bello PA-C  Authorized by: Rina Bello PA-C    Indications / Diagnosis:  Leg swelling   Patient location:  ED  Previous ECG:     Previous ECG:  Compared to current Comparison ECG info:  Normal sinus rhythm with incomplete RBBB when compared to EKG 3/4/22, similar findings noted with no significant change   Interpretation:     Interpretation: abnormal    Rate:     ECG rate:  77    ECG rate assessment: normal    Rhythm:     Rhythm: sinus rhythm               ED Course  ED Course as of 07/11/23 2347   Tue Jul 11, 2023   1609 Angela Marcos Vascular tech reports patient negative for DVT   1847 TT SLIM             HEART Risk Score    Flowsheet Row Most Recent Value   Heart Score Risk Calculator    History 0 Filed at: 07/11/2023 2346   ECG 1 Filed at: 07/11/2023 2346   Age 1 Filed at: 07/11/2023 2346   Risk Factors 1 Filed at: 07/11/2023 2346   Troponin 0 Filed at: 07/11/2023 2346   HEART Score 3 Filed at: 07/11/2023 2346                        SBIRT 20yo+    Flowsheet Row Most Recent Value   Initial Alcohol Screen: US AUDIT-C     1. How often do you have a drink containing alcohol? 0 Filed at: 07/11/2023 1433   2. How many drinks containing alcohol do you have on a typical day you are drinking? 0 Filed at: 07/11/2023 1433   3a. Male UNDER 65: How often do you have five or more drinks on one occasion? 0 Filed at: 07/11/2023 1433   3b. FEMALE Any Age, or MALE 65+: How often do you have 4 or more drinks on one occassion? 0 Filed at: 07/11/2023 1433   Audit-C Score 0 Filed at: 07/11/2023 1433   ZAYRA: How many times in the past year have you. .. Used an illegal drug or used a prescription medication for non-medical reasons?  Never Filed at: 07/11/2023 1433                    Medical Decision Making  65 y/o female here for bilateral leg swelling, pain and SOB  Differential diagnosis including but not limited to: DVT, CHF, PE, ACS, vascular insufficiency, nephrotic syndrome, CAM/ARF  Assessment: bilateral leg pain, elevated d-dimer   Plan: discussed case with hospitalist Lorena Garcia and Jamari Carrillo PA-C, due to elevated d-dimer at 1.24 which is > double the age adjusted limit of 0.61, needs further evaluation to r/o PE. Vitals stable, no interventions at this time. Patient unable to get CTA in ED due to stage 5 CKD. Heart score of 3. Patient agreed to this plan. Amount and/or Complexity of Data Reviewed  Labs: ordered. Radiology: ordered. Risk  OTC drugs. Decision regarding hospitalization. Disposition  Final diagnoses:   Elevated d-dimer   Bilateral leg pain     Time reflects when diagnosis was documented in both MDM as applicable and the Disposition within this note     Time User Action Codes Description Comment    7/11/2023  7:22 PM Rene Penaloza [R79.89] Elevated d-dimer     7/11/2023  7:22 PM Robin Arora Add [W40.269,  M79.605] Bilateral leg pain       ED Disposition     ED Disposition   Admit    Condition   Stable    Date/Time   Tue Jul 11, 2023  7:22 PM    Comment   Case was discussed with Johna Ganser and the patient's admission status was agreed to be Admission Status: observation status to the service of Dr. Parth Mishra. Follow-up Information    None         Patient's Medications   Discharge Prescriptions    No medications on file       No discharge procedures on file.     PDMP Review       Value Time User    PDMP Reviewed  Yes 6/30/2023  6:01 PM Jacinto Desouza DO          ED Provider  Electronically Signed by           Christiana Casiano PA-C  07/11/23 8102

## 2023-07-11 NOTE — H&P
[facility]  H&P  Name: Ana Denton 66 y.o. female I MRN: [id number]  Unit/Bed#: ED 07 I Date of Admission: 7/11/2023   Date of Service: 7/11/2023 I Hospital Day: 0      Assessment/Plan   * Shortness of breath  Assessment & Plan  · Presents from home due to dyspnea on exertion and swelling in her left lower extremity over the past 3-4 days  · Patient on RA without tachycardia or tachypnea  · BNP and troponin WNL  · D-dimer elevated at 1.24  · Unable to have CTA chest due to CKD 4. · Bilateral duplex study performed. Negative for DVTs  · ER unable to schedule VQ scan. Admitted under observation for V/Q.   · Due to patient being hemodynamically stable hold off on starting anticoagulation at this time    Leg swelling  Assessment & Plan  · Swelling in left lower extremity. No edema noted on exam.   · Pt reports she fell onto her knees in the past week due to tripping over dog. Denies head strike or LOC. Swelling started shortly after. · Pt is to use walker but is noncompliant  · Xray of left ankle performed outpatient on 7/6. No fracture noted   · BNP WNL   · Duplex study negative   · Recommend elevating extremities and icing when able   · Order xray of bilateral knees. · PT/OT   · Continue to monitor     Obesity (BMI 30-39. 9)  Assessment & Plan  · BMI 35.55  · Encourage lifestyle changes    Accelerated hypertension  Assessment & Plan  · Home regimen: Amlodipine, carvedilol  · Normotensive in the ER thus far.   Continue to monitor    CKD (chronic kidney disease) stage 4, GFR 15-29 ml/min [facility])  Assessment & Plan  Lab Results   Component Value Date    EGFR 21 07/11/2023    EGFR 19 04/22/2023    EGFR 18 02/20/2023    CREATININE 2.36 (H) 07/11/2023    CREATININE 2.59 (H) 04/22/2023    CREATININE 2.73 (H) 02/20/2023   · Creatinine 2.36 (GFR 21) on admission  · Baseline appears to be about 2.3-2.7  · Continue to monitor    Bipolar 1 disorder, depressed, severe ([address])  Assessment & Plan  · Home regimen: Luvox and Seroquel       VTE Pharmacologic Prophylaxis: VTE Score: 3 Moderate Risk (Score 3-4) - Pharmacological DVT Prophylaxis Ordered: heparin. Code Status: Level 1 Full Code   Discussion with family: Patient declined call to . Anticipated Length of Stay: Patient will be admitted on an observation basis with an anticipated length of stay of less than 2 midnights secondary to SOB. Total Time Spent on Date of Encounter in care of patient: 65 minutes This time was spent on one or more of the following: performing physical exam; counseling and coordination of care; obtaining or reviewing history; documenting in the medical record; reviewing/ordering tests, medications or procedures; communicating with other healthcare professionals and discussing with patient's family/caregivers. Chief Complaint: SOB with leg swelling     History of Present Illness:  Vishal Madrigal is a 66 y.o. female with a PMH of hypertension, bipolar, CKD 4, obesity who presents from home due to swelling in her left lower extremity and increasing dyspnea on exertion. Saw her PCP on 7/6 due to having 2 falls with continued discomfort in her left ankle. X-ray was ordered and ultimately negative. PCP recommended patient use her walker which she is noncompliant with. Patient confirms this today in the ER. Since having these falls reports increasing dyspnea. However, patient reporting in the ER that it is harder to get around since having these injuries which she believes may be contributing to her shortness of breath. ED concern due to elevated D-dimer. Patient with CKD 4 unable to have contrast.  Admitted under observation for V/Q. Low suspicion for PE. Patient denies prior clots. Not on anticoagulation. Review of Systems:  Review of Systems   Constitutional: Negative for fatigue and fever. HENT: Negative for sore throat. Respiratory: Positive for shortness of breath.  Negative for cough and chest tightness. Cardiovascular: Positive for leg swelling. Negative for chest pain. Gastrointestinal: Negative for abdominal distention, abdominal pain, diarrhea, nausea and vomiting. Genitourinary: Negative for difficulty urinating. Musculoskeletal: Negative for arthralgias. Knee pain   Neurological: Negative for weakness and headaches. Psychiatric/Behavioral: Negative for agitation and behavioral problems. All other systems reviewed and are negative.       Past Medical and Surgical History:   Past Medical History:   Diagnosis Date   • Anxiety    • Anxiety disorder    • Arthritis    • At risk for falls    • Bipolar 2 disorder ([address])    • Chronic back pain    • Chronic kidney disease    • Closed fracture of distal end of right fibula with routine healing 2020   • COVID-19     in 2021   • CVA (cerebral vascular accident) [facility])     noted on MRI in the past   • Depression    • GERD (gastroesophageal reflux disease)    • Hypercholesteremia    • Hypernatremia    • Hypertension    • Hypokalemia    • Idiopathic chronic pancreatitis ([address]) 2018   • Intervertebral disc disorder with radiculopathy of lumbosacral region     resolved: 2015   • Kidney disease    • Kidney disease    • Limb alert care status     LUE-fistula   • Panic attacks    • Pericardial effusion    • PONV (postoperative nausea and vomiting)    • Psychiatric problem    • Radiculitis     resolved: 2015   • Secondary renal hyperparathyroidism ([address])    • Stroke [facility])    • Vitamin D deficiency        Past Surgical History:   Procedure Laterality Date   • BUNIONECTOMY      Left foot    • CAST APPLICATION Right     Procedure: Application short-arm thumb spica splint;  Surgeon: Kip Edgar MD;  Location:  MAIN OR;  Service: Orthopedics   • COLON SURGERY     • COLONOSCOPY  2021   • DILATION AND CURETTAGE OF UTERUS     • INDUCED       surgically induced   • ND ARTERIOVENOUS ANASTOMOSIS OPEN DIRECT Left 11/18/2019    Procedure: CREATION FISTULA ARTERIOVENOUS (AV) left wrists possible left upper;  Surgeon: Corey Huang MD;  Location: [facility] MAIN OR;  Service: Vascular   • [facility] W/SESMDC W/DIST Lau Musty Left 7/1/2019    Procedure: [place] Ramus;  Surgeon: Megan Singh DPM;  Location: [facility];  Service: Podiatry   • [address] [facility] W/SESMDC W/DIST Lau Musty Right 8/3/2020    Procedure: Terisa Viviana;  Surgeon: Megan Singh DPM;  [facility]: [facility];  Service: Podiatry   • MO CORRJ HALLUX VALGUS W/SESMDC W/PROX PHLNX OSTEOT Right 9/27/2021    Procedure: Jin Alisson, right tameka osteotomy and 2nd claw toe correction;  Surgeon: Rob Escobedo MD;  Location: [facility];  Service: Orthopedics   • MO ERCP DX COLLECTION SPECIMEN BRUSHING/WASHING N/A 4/11/2018    Procedure: ENDOSCOPIC RETROGRADE CHOLANGIOPANCREATOGRAPHY (ERCP); Surgeon: Donnie Crhistensen MD;  Location: [facility];  Service: Gastroenterology   • MO LAPAROSCOPY PROCTOPEXY PROLAPSE N/A 7/13/2018    Procedure: ROBOTIC SIGMOID RESECTION / RECTOPEXY;  Surgeon: Christiano Gonzales MD;  Location: [facility];  Service: Colorectal   • MO OPEN TREATMENT RADIAL SHAFT FRACTURE Right 10/11/2021    Procedure: OPEN REDUCTION W/ INTERNAL FIXATION (ORIF) RADIUS (WRIST), RIGHT DISTAL;  Surgeon: Sandor Brambila MD;  Location: [facility];  Service: Orthopedics   • MO REMOVAL IMPLANT DEEP Right 6/23/2022    Procedure: Removal of hardware volar aspect right distal radius (distal radial plate and screws);   Surgeon: Jaycee Mittal MD;  Location: [facility];  Service: Orthopedics   • MO SIGMOIDOSCOPY FLX DX W/COLLJ SPEC BR/WA IF PFRMD N/A 7/13/2018    Procedure: SIGMOIDOSCOPY FLEXIBLE;  Surgeon: Christiano Gonzales MD;  [facility];  Service: Colorectal   • MO TR TDN RESTORE INTRNSC FUNCJ ALL 4 FNGRS Right 6/23/2022    Procedure: Right ring finger flexor digitorum superficialis to flexor pollicis longus tendon transfer;  Surgeon: Jaycee Mittal MD;  Location:  MAIN OR;  Service: Orthopedics   • TUBAL LIGATION Bilateral    • US GUIDED THYROID BIOPSY  2019       Meds/Allergies:  Prior to Admission medications    Medication Sig Start Date End Date Taking?  Authorizing Provider   acetaminophen (TYLENOL) 650 mg CR tablet Take 1 tablet (650 mg total) by mouth every 8 (eight) hours as needed for mild pain 22   Jv Lomeli PA-C   albuterol (Ventolin HFA) 90 mcg/act inhaler Inhale 2 puffs every 6 (six) hours as needed for wheezing or shortness of breath 23   Samantha Ramirez, DO   AMILoride 5 mg tablet Take 1 tablet (5 mg total) by mouth 2 (two) times a day 23   Samantha Ramirez, DO   amLODIPine (NORVASC) 5 mg tablet Take 1 tablet (5 mg total) by mouth daily 23   Yannick Simper, DO   aspirin 81 mg chewable tablet Chew 1 tablet (81 mg total) daily 21   Yannick Simhaseeb, DO   budesonide-formoterol (Symbicort) 160-4.5 mcg/act inhaler Inhale 2 puffs 2 (two) times a day Rinse mouth after use. 23   Bette Harp, DO   carvedilol (COREG) 25 mg tablet TAKE 1 TABLET BY MOUTH  TWICE DAILY WITH MEALS 4/3/23   Tammy Mcguire MD   cholecalciferol (VITAMIN D3) 1,000 units tablet Take 5 tablets (5,000 Units total) by mouth daily 22   Samantha Ramirez DO   clonazePAM (KlonoPIN) 0.5 mg tablet Take 1 tablet (0.5 mg total) by mouth 4 (four) times a day  Patient taking differently: Take 0.5 mg by mouth 3 (three) times a day 6/15/23   EMERITA White   fluocinonide (LIDEX) 0.05 % ointment Apply topically 2 (two) times a day 23   Samantha Ramirez DO   fluvoxaMINE (LUVOX) 100 mg tablet Fluvoxamine 100m tablet in the morning ; 2 tablets at night 6/15/23   EMERITA White   lamoTRIgine (LaMICtal) 200 MG tablet TAKE 1 TABLET BY MOUTH IN THE  MORNING AND 1 TABLET AT NIGHT 23   Satish Blancas MD   omeprazole (PriLOSEC) 40 MG capsule TAKE 1 CAPSULE BY MOUTH  DAILY BEFORE BREAKFAST 23 Bette Harp DO   ondansetron (ZOFRAN) 4 mg tablet Take 1 tablet (4 mg total) by mouth every 8 (eight) hours as needed for nausea or vomiting 5/15/23   Hipolito Coelho DO   QUEtiapine (SEROquel) 100 mg tablet Quetiapine 100mg : 1 tablet + 400mg po at night 23   EMERITA Ibarra   QUEtiapine (SEROquel) 300 mg tablet Quetiapine 300m tablet po  in morning 23   EMERITA Ibarra   QUEtiapine (SEROquel) 400 MG tablet Quetiapine 400m tablet + 100mg tablet po at night 23   EMERITA Ibarra   rosuvastatin (CRESTOR) 20 MG tablet TAKE 1 TABLET BY MOUTH IN  THE EVENING 4/3/23   Marlo Short MD   traMADol (Ultram) 50 mg tablet Take 1 tablet (50 mg total) by mouth every 12 (twelve) hours as needed for moderate pain 23   Hipolito Coelho DO     I have reviewed home medications with patient personally. Allergies:    Allergies   Allergen Reactions   • Molds & Smuts Nasal Congestion   • Bee Pollen Nasal Congestion     Other reaction(s): Nasal Congestion   • Pollen Extract Nasal Congestion       Social History:  Marital Status:    Occupation: unknown  Patient Pre-hospital Living Situation: Home  Patient Pre-hospital Level of Mobility: walks  Patient Pre-hospital Diet Restrictions: regular   Substance Use History:   Social History     Substance and Sexual Activity   Alcohol Use Never     Social History     Tobacco Use   Smoking Status Never   Smokeless Tobacco Never     Social History     Substance and Sexual Activity   Drug Use Not Currently       Family History:  Family History   Problem Relation Age of Onset   • Bipolar disorder Mother    • Mental illness Mother         depression   • Stroke Mother    • Dementia Mother    • Colon polyps Mother    • Heart disease Father    • Hypertension Father    • Diabetes Father    • Other Family         Back disorder   • Diabetes Family    • Heart disease Family    • Hypertension Family    • Stroke Family    • Thyroid disease Family    • Breast cancer Paternal Grandmother         age unknown   • Breast cancer Paternal Aunt         age unknown   • Breast cancer Maternal Aunt         age unknown   • Mental illness Sister    • Colon polyps Sister    • Mental illness Sister    • Heart disease Sister    • No Known Problems Sister    • Breast cancer Sister 76   • Other Son         pituitary tumor   • Hypertension Son    • Obesity Son    • No Known Problems Son    • No Known Problems Maternal Grandmother    • No Known Problems Maternal Grandfather    • No Known Problems Paternal Grandfather    • Breast cancer Paternal Aunt         age unknown   • Substance Abuse Neg Hx         neg fam hx   • Colon cancer Neg Hx        Physical Exam:     Vitals:   Blood Pressure: 142/74 (07/11/23 1845)  Pulse: 80 (07/11/23 1845)  Temperature: 97.5 °F (36.4 °C) (07/11/23 1433)  Respirations: 18 (07/11/23 1845)  SpO2: 95 % (07/11/23 1845)    Physical Exam  Vitals and nursing note reviewed. Constitutional:       Appearance: Normal appearance. She is obese. HENT:      Head: Normocephalic. Eyes:      Extraocular Movements: Extraocular movements intact. Pupils: Pupils are equal, round, and reactive to light. Cardiovascular:      Rate and Rhythm: Normal rate and regular rhythm. Heart sounds: No murmur heard. Pulmonary:      Effort: Pulmonary effort is normal. No respiratory distress. Breath sounds: Normal breath sounds. No wheezing. Abdominal:      General: Bowel sounds are normal. There is no distension. Tenderness: There is no abdominal tenderness. There is no guarding. Musculoskeletal:         General: Normal range of motion. Cervical back: Normal range of motion. Right lower leg: No edema. Left lower leg: No edema. Comments: LLE with mild swelling, no edema   Skin:     General: Skin is warm. Comments: abrasions noted to bilateral knees   Neurological:      General: No focal deficit present.       Mental Status: She is alert and oriented to person, place, and time. Psychiatric:         Mood and Affect: Mood normal.         Behavior: Behavior normal.         Thought Content: Thought content normal.          Additional Data:     Lab Results:  Results from last 7 days   Lab Units 07/11/23  1505   WBC Thousand/uL 6.12   HEMOGLOBIN g/dL 10.7*   HEMATOCRIT % 34.8   PLATELETS Thousands/uL 231   NEUTROS PCT % 64   LYMPHS PCT % 20   MONOS PCT % 9   EOS PCT % 6     Results from last 7 days   Lab Units 07/11/23  1546   SODIUM mmol/L 138   POTASSIUM mmol/L 5.0   CHLORIDE mmol/L 108   CO2 mmol/L 23   BUN mg/dL 30*   CREATININE mg/dL 2.36*   ANION GAP mmol/L 7   CALCIUM mg/dL 9.3   ALBUMIN g/dL 3.9   TOTAL BILIRUBIN mg/dL 0.28   ALK PHOS U/L 186*   ALT U/L 14   AST U/L 20   GLUCOSE RANDOM mg/dL 94             Results from last 7 days   Lab Units 07/05/23  1209   HGBA1C % 5.6           Lines/Drains:  Invasive Devices     Peripheral Intravenous Line  Duration           Peripheral IV 07/11/23 Right;Ventral (anterior) Hand <1 day          Line  Duration           Hemodialysis AV Fistula 11/18/19 Left  1331 days                    Imaging: Reviewed radiology reports from this admission including: chest xray and ultrasound(s)  VAS lower limb venous duplex study, complete bilateral   Final Result by Ja Arana MD (07/11 2045)      XR chest 2 views    (Results Pending)   XR knee 4+ vw left injury    (Results Pending)   XR knee 4+ vw right injury    (Results Pending)       EKG and Other Studies Reviewed on Admission:   · EKG: NSR. HR 77.    ** Please Note: This note has been constructed using a voice recognition system.  **

## 2023-07-12 ENCOUNTER — APPOINTMENT (OUTPATIENT)
Dept: NUCLEAR MEDICINE | Facility: HOSPITAL | Age: 63
DRG: 189 | End: 2023-07-12
Payer: MEDICARE

## 2023-07-12 ENCOUNTER — APPOINTMENT (OUTPATIENT)
Dept: RADIOLOGY | Facility: HOSPITAL | Age: 63
DRG: 189 | End: 2023-07-12
Payer: MEDICARE

## 2023-07-12 LAB
ANION GAP SERPL CALCULATED.3IONS-SCNC: 7 MMOL/L
ATRIAL RATE: 77 BPM
BASOPHILS # BLD AUTO: 0.06 THOUSANDS/ÂΜL (ref 0–0.1)
BASOPHILS NFR BLD AUTO: 1 % (ref 0–1)
BUN SERPL-MCNC: 26 MG/DL (ref 5–25)
CALCIUM SERPL-MCNC: 9.5 MG/DL (ref 8.4–10.2)
CHLORIDE SERPL-SCNC: 110 MMOL/L (ref 96–108)
CO2 SERPL-SCNC: 26 MMOL/L (ref 21–32)
CREAT SERPL-MCNC: 2.48 MG/DL (ref 0.6–1.3)
EOSINOPHIL # BLD AUTO: 0.41 THOUSAND/ÂΜL (ref 0–0.61)
EOSINOPHIL NFR BLD AUTO: 8 % (ref 0–6)
ERYTHROCYTE [DISTWIDTH] IN BLOOD BY AUTOMATED COUNT: 12.6 % (ref 11.6–15.1)
GFR SERPL CREATININE-BSD FRML MDRD: 20 ML/MIN/1.73SQ M
GLUCOSE SERPL-MCNC: 106 MG/DL (ref 65–140)
HCT VFR BLD AUTO: 33.8 % (ref 34.8–46.1)
HGB BLD-MCNC: 10.4 G/DL (ref 11.5–15.4)
IMM GRANULOCYTES # BLD AUTO: 0.02 THOUSAND/UL (ref 0–0.2)
IMM GRANULOCYTES NFR BLD AUTO: 0 % (ref 0–2)
LYMPHOCYTES # BLD AUTO: 1.35 THOUSANDS/ÂΜL (ref 0.6–4.47)
LYMPHOCYTES NFR BLD AUTO: 26 % (ref 14–44)
MCH RBC QN AUTO: 31.6 PG (ref 26.8–34.3)
MCHC RBC AUTO-ENTMCNC: 30.8 G/DL (ref 31.4–37.4)
MCV RBC AUTO: 103 FL (ref 82–98)
MONOCYTES # BLD AUTO: 0.62 THOUSAND/ÂΜL (ref 0.17–1.22)
MONOCYTES NFR BLD AUTO: 12 % (ref 4–12)
NEUTROPHILS # BLD AUTO: 2.84 THOUSANDS/ÂΜL (ref 1.85–7.62)
NEUTS SEG NFR BLD AUTO: 53 % (ref 43–75)
NRBC BLD AUTO-RTO: 0 /100 WBCS
P AXIS: 65 DEGREES
PLATELET # BLD AUTO: 191 THOUSANDS/UL (ref 149–390)
PMV BLD AUTO: 9.9 FL (ref 8.9–12.7)
POTASSIUM SERPL-SCNC: 4.4 MMOL/L (ref 3.5–5.3)
PR INTERVAL: 144 MS
QRS AXIS: 63 DEGREES
QRSD INTERVAL: 112 MS
QT INTERVAL: 436 MS
QTC INTERVAL: 493 MS
RBC # BLD AUTO: 3.29 MILLION/UL (ref 3.81–5.12)
SODIUM SERPL-SCNC: 143 MMOL/L (ref 135–147)
T WAVE AXIS: 50 DEGREES
VENTRICULAR RATE: 77 BPM
WBC # BLD AUTO: 5.3 THOUSAND/UL (ref 4.31–10.16)

## 2023-07-12 PROCEDURE — 93010 ELECTROCARDIOGRAM REPORT: CPT | Performed by: INTERNAL MEDICINE

## 2023-07-12 PROCEDURE — 99223 1ST HOSP IP/OBS HIGH 75: CPT | Performed by: INTERNAL MEDICINE

## 2023-07-12 PROCEDURE — A9558 XE133 XENON 10MCI: HCPCS

## 2023-07-12 PROCEDURE — 99232 SBSQ HOSP IP/OBS MODERATE 35: CPT | Performed by: HOSPITALIST

## 2023-07-12 PROCEDURE — 78582 LUNG VENTILAT&PERFUS IMAGING: CPT

## 2023-07-12 PROCEDURE — A9540 TC99M MAA: HCPCS

## 2023-07-12 PROCEDURE — 36415 COLL VENOUS BLD VENIPUNCTURE: CPT | Performed by: INTERNAL MEDICINE

## 2023-07-12 PROCEDURE — 85025 COMPLETE CBC W/AUTO DIFF WBC: CPT | Performed by: INTERNAL MEDICINE

## 2023-07-12 PROCEDURE — 73630 X-RAY EXAM OF FOOT: CPT

## 2023-07-12 PROCEDURE — 80048 BASIC METABOLIC PNL TOTAL CA: CPT | Performed by: INTERNAL MEDICINE

## 2023-07-12 PROCEDURE — G1004 CDSM NDSC: HCPCS

## 2023-07-12 RX ADMIN — HEPARIN SODIUM 5000 UNITS: 5000 INJECTION INTRAVENOUS; SUBCUTANEOUS at 21:28

## 2023-07-12 RX ADMIN — QUETIAPINE FUMARATE 300 MG: 300 TABLET ORAL at 09:07

## 2023-07-12 RX ADMIN — CARVEDILOL 25 MG: 12.5 TABLET, FILM COATED ORAL at 17:03

## 2023-07-12 RX ADMIN — CLONAZEPAM 0.5 MG: 0.5 TABLET ORAL at 09:08

## 2023-07-12 RX ADMIN — ATORVASTATIN CALCIUM 20 MG: 20 TABLET, FILM COATED ORAL at 17:03

## 2023-07-12 RX ADMIN — FLUVOXAMINE MALEATE 200 MG: 100 TABLET ORAL at 21:27

## 2023-07-12 RX ADMIN — AMILORIDE HYDROCLORIDE 5 MG: 5 TABLET ORAL at 09:16

## 2023-07-12 RX ADMIN — CLONAZEPAM 0.5 MG: 0.5 TABLET ORAL at 21:27

## 2023-07-12 RX ADMIN — LAMOTRIGINE 200 MG: 100 TABLET ORAL at 21:28

## 2023-07-12 RX ADMIN — CLONAZEPAM 0.5 MG: 0.5 TABLET ORAL at 17:03

## 2023-07-12 RX ADMIN — HEPARIN SODIUM 5000 UNITS: 5000 INJECTION INTRAVENOUS; SUBCUTANEOUS at 13:58

## 2023-07-12 RX ADMIN — Medication 5000 UNITS: at 09:16

## 2023-07-12 RX ADMIN — AMILORIDE HYDROCLORIDE 5 MG: 5 TABLET ORAL at 17:03

## 2023-07-12 RX ADMIN — ACETAMINOPHEN 650 MG: 325 TABLET, FILM COATED ORAL at 14:52

## 2023-07-12 RX ADMIN — BUDESONIDE AND FORMOTEROL FUMARATE DIHYDRATE 2 PUFF: 160; 4.5 AEROSOL RESPIRATORY (INHALATION) at 21:49

## 2023-07-12 RX ADMIN — FLUVOXAMINE MALEATE 100 MG: 100 TABLET ORAL at 09:16

## 2023-07-12 RX ADMIN — HEPARIN SODIUM 5000 UNITS: 5000 INJECTION INTRAVENOUS; SUBCUTANEOUS at 05:52

## 2023-07-12 RX ADMIN — TRAMADOL HYDROCHLORIDE 50 MG: 50 TABLET, COATED ORAL at 09:06

## 2023-07-12 RX ADMIN — AMLODIPINE BESYLATE 5 MG: 5 TABLET ORAL at 09:06

## 2023-07-12 RX ADMIN — CARVEDILOL 25 MG: 12.5 TABLET, FILM COATED ORAL at 09:08

## 2023-07-12 RX ADMIN — LAMOTRIGINE 200 MG: 100 TABLET ORAL at 09:13

## 2023-07-12 RX ADMIN — ASPIRIN 81 MG CHEWABLE TABLET 81 MG: 81 TABLET CHEWABLE at 09:09

## 2023-07-12 RX ADMIN — QUETIAPINE 500 MG: 200 TABLET ORAL at 21:28

## 2023-07-12 RX ADMIN — PANTOPRAZOLE SODIUM 40 MG: 40 TABLET, DELAYED RELEASE ORAL at 05:52

## 2023-07-12 NOTE — CONSULTS
Consultation - Pulmonary Medicine  Faith Burgos 66 y.o. female MRN: [id number]  Unit/Bed#: -01 Encounter: [id number]  Code Status: Level 1 - Full Code    Assessment/Plan:    · Acute hypoxic respiratory failure  · Volume overload/pulmonary edema  · History of groundglass opacity    VQ scan low probability for PE  Agree with holding on CAT scan angio in view of CKD stage IV  Recommend consulting nephrology  Groundglass noted on previous CAT scans most likely represents pulmonary edema, resolved and current imaging  Defer diuresis to primary team and nephrology        D/C and Follow up Needs:  Discussed with the primary team  Thank you for this consultation; we will be happy to follow with you.    ______________________________________________________________________      History of Present Illness   Physician Requesting Consult: Maddie Arizmendi MD  Reason for Consult / Principal Problem: SOB  HX and PE limited by:     HPI:  Faith Burgos is a 66 y.o. female  has a past medical history of Anxiety, Anxiety disorder, Arthritis, At risk for falls, Bipolar 2 disorder ([address]), Chronic back pain, Chronic kidney disease, Closed fracture of distal end of right fibula with routine healing (11/04/2020), COVID-19, CVA (cerebral vascular accident) ([address]), Depression, GERD (gastroesophageal reflux disease), Hypercholesteremia, Hypernatremia, Hypertension, Hypokalemia, Idiopathic chronic pancreatitis ([address]) (03/20/2018), Intervertebral disc disorder with radiculopathy of lumbosacral region, Kidney disease, Kidney disease, Limb alert care status, Panic attacks, Pericardial effusion, PONV (postoperative nausea and vomiting), Psychiatric problem, Radiculitis, Secondary renal hyperparathyroidism ([address]), Stroke ([address]), and Vitamin D deficiency. who presents with Chief Complaint of : Shortness of breath. The patient was admitted on 7/11/2023 with worsening shortness of breath bilateral lower extremity edema.   She has seen her primary care physician few days earlier due to discomfort in her ankle    The patient has history of CKD stage IV low suspicious for PE however needed to be ruled out, pending V/Q        Review of Systems   All other systems reviewed and are negative.       Past Medical/Surgical History  Past Medical History:   Diagnosis Date   • Anxiety    • Anxiety disorder    • Arthritis    • At risk for falls    • Bipolar 2 disorder (HCC)    • Chronic back pain    • Chronic kidney disease    • Closed fracture of distal end of right fibula with routine healing 2020   • COVID-19     in 2021   • CVA (cerebral vascular accident) [facility])     noted on MRI in the past   • Depression    • GERD (gastroesophageal reflux disease)    • Hypercholesteremia    • Hypernatremia    • Hypertension    • Hypokalemia    • Idiopathic chronic pancreatitis ([address]) 2018   • Intervertebral disc disorder with radiculopathy of lumbosacral region     resolved: 2015   • Kidney disease    • Kidney disease    • Limb alert care status     LUE-fistula   • Panic attacks    • Pericardial effusion    • PONV (postoperative nausea and vomiting)    • Psychiatric problem    • Radiculitis     resolved: 2015   • Secondary renal hyperparathyroidism ([address])    • Stroke [facility])    • Vitamin D deficiency      Past Surgical History:   Procedure Laterality Date   • BUNIONECTOMY      Left foot    • CAST APPLICATION Right     Procedure: Application short-arm thumb spica splint;  Surgeon: Abby Chi MD;  Location:  MAIN OR;  Service: Orthopedics   • COLON SURGERY     • COLONOSCOPY  2021   • DILATION AND CURETTAGE OF UTERUS     • INDUCED       surgically induced   • TX ARTERIOVENOUS ANASTOMOSIS OPEN DIRECT Left 2019    Procedure: CREATION FISTULA ARTERIOVENOUS (AV) left wrists possible left upper;  Surgeon: Felicia Mc MD;  Location:  MAIN OR;  Service: Vascular   • TX CORRJ HALLUX VALGUS W/SESMDC W/DIST Judeth Screws Left 7/1/2019    Procedure: Rodriguez Epley;  Surgeon: Avanell Gaucher, DPM;  Location: [facility] MAIN OR;  Service: Podiatry   • [address] W/SESMDC W/DIST Maryuri Mosquero Right 8/3/2020    Procedure: Carlene Signs;  Surgeon: Avanell Gaucher, DPM;  Location: [facility];  Service: Podiatry   • FL CORRJ HALLUX VALGUS W/SESMDC W/PROX PHLNX OSTEOT Right 9/27/2021    Procedure: Jessica Oreillyalee, right tameka osteotomy and 2nd claw toe correction;  Surgeon: Natacha Womack MD;  Location: [facility];  Service: Orthopedics   • FL ERCP DX COLLECTION SPECIMEN BRUSHING/WASHING N/A 4/11/2018    Procedure: ENDOSCOPIC RETROGRADE CHOLANGIOPANCREATOGRAPHY (ERCP); Surgeon: Jeffry Baltazar MD;  Location: [facility];  Service: Gastroenterology   • FL LAPAROSCOPY PROCTOPEXY PROLAPSE N/A 7/13/2018    Procedure: ROBOTIC SIGMOID RESECTION / RECTOPEXY;  Surgeon: Dirk Hennessy MD;  Location: [facility];  Service: Colorectal   • FL OPEN TREATMENT RADIAL SHAFT FRACTURE Right 10/11/2021    Procedure: OPEN REDUCTION W/ INTERNAL FIXATION (ORIF) RADIUS (WRIST), RIGHT DISTAL;  Surgeon: Trevor Wagner MD;  Location: [facility];  Service: Orthopedics   • FL REMOVAL IMPLANT DEEP Right 6/23/2022    Procedure: Removal of hardware volar aspect right distal radius (distal radial plate and screws);   Surgeon: Ana Bailon MD;  Location: [facility];  Service: Orthopedics   • FL SIGMOIDOSCOPY FLX DX W/COLLJ SPEC BR/WA IF PFRMD N/A 7/13/2018    Procedure: SIGMOIDOSCOPY FLEXIBLE;  Surgeon: Dirk Hennessy MD;  Location: [facility];  Service: Colorectal   • FL TR TDN RESTORE INTRNSC FUNCJ ALL 4 FNGRS Right 6/23/2022    Procedure: Right ring finger flexor digitorum superficialis to flexor pollicis longus tendon transfer;  Surgeon: Ana Bailon MD;  Location: [facility];  Service: Orthopedics   • TUBAL LIGATION Bilateral 1997   • [address] THYROID BIOPSY  7/30/2019       Social History  Social History     Substance and Sexual Activity   Alcohol Use Never       Social History     Substance and Sexual Activity   Drug Use Not Currently       Social History     Tobacco Use   Smoking Status Never   Smokeless Tobacco Never       Family History  Family History   Problem Relation Age of Onset   • Bipolar disorder Mother    • Mental illness Mother         depression   • Stroke Mother    • Dementia Mother    • Colon polyps Mother    • Heart disease Father    • Hypertension Father    • Diabetes Father    • Other Family         Back disorder   • Diabetes Family    • Heart disease Family    • Hypertension Family    • Stroke Family    • Thyroid disease Family    • Breast cancer Paternal Grandmother         age unknown   • Breast cancer Paternal Aunt         age unknown   • Breast cancer Maternal Aunt         age unknown   • Mental illness Sister    • Colon polyps Sister    • Mental illness Sister    • Heart disease Sister    • No Known Problems Sister    • Breast cancer Sister 76   • Other Son         pituitary tumor   • Hypertension Son    • Obesity Son    • No Known Problems Son    • No Known Problems Maternal Grandmother    • No Known Problems Maternal Grandfather    • No Known Problems Paternal Grandfather    • Breast cancer Paternal Aunt         age unknown   • Substance Abuse Neg Hx         neg fam hx   • Colon cancer Neg Hx        Allergies  Allergies   Allergen Reactions   • Molds & Smuts Nasal Congestion   • Bee Pollen Nasal Congestion     Other reaction(s): Nasal Congestion   • Pollen Extract Nasal Congestion       Home Meds:   Medications Prior to Admission   Medication Sig Dispense Refill Last Dose   • acetaminophen (TYLENOL) 650 mg CR tablet Take 1 tablet (650 mg total) by mouth every 8 (eight) hours as needed for mild pain (Patient taking differently: Take 500 mg by mouth every 8 (eight) hours as needed for mild pain) 30 tablet 0 Past Week   • albuterol (Ventolin HFA) 90 mcg/act inhaler Inhale 2 puffs every 6 (six) hours as needed for wheezing or shortness of breath 8.5 g 3 Past Week   • AMILoride 5 mg tablet Take 1 tablet (5 mg total) by mouth 2 (two) times a day 180 tablet 3 2023   • amLODIPine (NORVASC) 5 mg tablet Take 1 tablet (5 mg total) by mouth daily 90 tablet 3 2023   • aspirin 81 mg chewable tablet Chew 1 tablet (81 mg total) daily   2023   • budesonide-formoterol (Symbicort) 160-4.5 mcg/act inhaler Inhale 2 puffs 2 (two) times a day Rinse mouth after use.  (Patient taking differently: Inhale 2 puffs 2 (two) times a day as needed (sob) Rinse mouth after use.) 10.2 g 3 Past Week   • carvedilol (COREG) 25 mg tablet TAKE 1 TABLET BY MOUTH  TWICE DAILY WITH MEALS 180 tablet 3 2023   • clonazePAM (KlonoPIN) 0.5 mg tablet Take 1 tablet (0.5 mg total) by mouth 4 (four) times a day (Patient taking differently: Take 0.5 mg by mouth 3 (three) times a day) 120 tablet 3 2023   • fluvoxaMINE (LUVOX) 100 mg tablet Fluvoxamine 100m tablet in the morning ; 2 tablets at night 270 tablet 1 2023   • lamoTRIgine (LaMICtal) 200 MG tablet TAKE 1 TABLET BY MOUTH IN THE  MORNING AND 1 TABLET AT NIGHT 180 tablet 3 2023   • omeprazole (PriLOSEC) 40 MG capsule TAKE 1 CAPSULE BY MOUTH  DAILY BEFORE BREAKFAST 90 capsule 3 2023   • ondansetron (ZOFRAN) 4 mg tablet Take 1 tablet (4 mg total) by mouth every 8 (eight) hours as needed for nausea or vomiting 60 tablet 1 7/10/2023   • QUEtiapine (SEROquel) 100 mg tablet Quetiapine 100mg : 1 tablet + 400mg po at night 90 tablet 0 7/10/2023   • QUEtiapine (SEROquel) 300 mg tablet Quetiapine 300m tablet po  in morning 90 tablet 0 2023   • QUEtiapine (SEROquel) 400 MG tablet Quetiapine 400m tablet + 100mg tablet po at night 90 tablet 0 7/10/2023   • rosuvastatin (CRESTOR) 20 MG tablet TAKE 1 TABLET BY MOUTH IN  THE EVENING 90 tablet 3 2023   • traMADol (Ultram) 50 mg tablet Take 1 tablet (50 mg total) by mouth every 12 (twelve) hours as needed for moderate pain 60 tablet 0 Past Week   • cholecalciferol (VITAMIN D3) 1,000 units tablet Take 5 tablets (5,000 Units total) by mouth daily 90 tablet 5 More than a month   • fluocinonide (LIDEX) 0.05 % ointment Apply topically 2 (two) times a day (Patient not taking: Reported on 7/11/2023) 60 g 1 Not Taking     Current Meds:   Scheduled Meds:  Current Facility-Administered Medications   Medication Dose Route Frequency Provider Last Rate   • acetaminophen  650 mg Oral Q6H PRN Rebecca Sawant PA-C     • albuterol  2 puff Inhalation Q6H PRN Rebecca Sawant PA-C     • AMILoride  5 mg Oral BID Rebecca Sawant PA-C     • amLODIPine  5 mg Oral Daily Rebecca Sawant PA-C     • aspirin  81 mg Oral Daily Rebecca Sawant PA-C     • atorvastatin  20 mg Oral Daily With Dinner Rebecca Sawant PA-C     • budesonide-formoterol  2 puff Inhalation BID Rebecca Sawant PA-C     • carvedilol  25 mg Oral BID With Meals Rebecca Sawant PA-C     • cholecalciferol  5,000 Units Oral Daily Blanca Mcintyre PA-C     • clonazePAM  0.5 mg Oral TID Rebecca Sawant PA-C     • fluvoxaMINE  100 mg Oral Daily Rebecca Sawant PA-C     • fluvoxaMINE  200 mg Oral HS Blanca Mcintyre PA-C     • heparin (porcine)  5,000 Units Subcutaneous Q8H [address] Blanca Mcintyre PA-C     • lamoTRIgine  200 mg Oral BID Rebecca Sawant PA-C     • pantoprazole  40 mg Oral Early Morning Blanca Mcintyre PA-C     • QUEtiapine  300 mg Oral Daily Blanca Mcintyre PA-C     • QUEtiapine  500 mg Oral HS Blanca Mcintyre PA-C     • traMADol  50 mg Oral Q12H PRN Blanca Mcintyre PA-C       PRN Meds:acetaminophen, 650 mg, Q6H PRN  albuterol, 2 puff, Q6H PRN  traMADol, 50 mg, Q12H PRN        ____________________________________________________________________    Objective   Vitals:   Temp:  [97.8 °F (36.6 °C)-98 °F (36.7 °C)] 98 °F (36.7 °C)  HR:  [75-92] 87  Resp:  [13-22] 16  BP: (113-150)/(54-89) 146/79  Weight (last 2 days)     Date/Time Weight    07/12/23 1538 103 (227.07)        Vitals:    07/12/23 0945 07/12/23 1401 07/12/23 1519 07/12/23 1538   BP:  146/80 146/79    BP Location:   Right arm    Pulse: 92 86 87    Resp: 17 16     Temp:  97.8 °F (36.6 °C) 98 °F (36.7 °C)    TempSrc:   Oral    SpO2: 94% 98% 94%    Weight:    103 kg (227 lb 1.2 oz)   Height:    5' 7" (1.702 m)     Temp (24hrs), Av.9 °F (36.6 °C), Min:97.8 °F (36.6 °C), Max:98 °F (36.7 °C)  Current: Temperature: 98 °F (36.7 °C)        SpO2: SpO2: 94 %, SpO2 Activity: SpO2 Activity: At Rest, SpO2 Device: O2 Device: None (Room air), Capnography:         IV Infusions:        Nutrition:        Diet Orders   (From admission, onward)             Start     Ordered    23  Diet Regular; Regular House  (ED Bridging Orders Panel)  Diet effective now        References:    Adult Nutrition Support Algorithm    RD Therapeutic Diet Order Protocol   Question Answer Comment   Diet Type Regular    Regular Regular House    RD to adjust diet per protocol? Yes        23                  Ins/Outs:   I/O     None            Lines/Drains:  Invasive Devices     Peripheral Intravenous Line  Duration           Peripheral IV 23 Right Antecubital <1 day          Line  Duration           Hemodialysis AV Fistula 19 Left  1332 days                ____________________________________________________________________      Physical Exam  Constitutional:       Appearance: Normal appearance. HENT:      Head: Normocephalic and atraumatic. Nose: No congestion or rhinorrhea. Mouth/Throat:      Mouth: Mucous membranes are moist.      Pharynx: Oropharynx is clear. No oropharyngeal exudate. Eyes:      General: No scleral icterus. Right eye: No discharge. Left eye: No discharge. Conjunctiva/sclera: Conjunctivae normal.   Cardiovascular:      Rate and Rhythm: Normal rate and regular rhythm. Pulses: Normal pulses. Heart sounds: Normal heart sounds. No gallop. Pulmonary:      Effort: Pulmonary effort is normal.      Breath sounds: No stridor. No wheezing, rhonchi or rales. Comments: Decreased breath sound bilaterally limited air entry  Abdominal:      General: Abdomen is flat. Bowel sounds are normal.      Palpations: Abdomen is soft. Musculoskeletal:         General: No swelling or tenderness. Cervical back: Normal range of motion and neck supple. Skin:     General: Skin is warm. Coloration: Skin is not pale. Findings: No erythema. Neurological:      General: No focal deficit present. Mental Status: She is alert and oriented to person, place, and time. Mental status is at baseline. Psychiatric:         Mood and Affect: Mood normal.         Behavior: Behavior normal.         ____________________________________________________________________    Invasive/non-invasive ventilation settings   Respiratory    Lab Data (Last 4 hours)    None         O2/Vent Data (Last 4 hours)    None                Laboratory and Diagnostics:  Results from last 7 days   Lab Units 07/12/23  0555 07/11/23  1505   WBC Thousand/uL 5.30 6.12   HEMOGLOBIN g/dL 10.4* 10.7*   HEMATOCRIT % 33.8* 34.8   PLATELETS Thousands/uL 191 231   NEUTROS PCT % 53 64   MONOS PCT % 12 9   EOS PCT % 8* 6     Results from last 7 days   Lab Units 07/12/23  0555 07/11/23  1546   SODIUM mmol/L 143 138   POTASSIUM mmol/L 4.4 5.0   CHLORIDE mmol/L 110* 108   CO2 mmol/L 26 23   ANION GAP mmol/L 7 7   BUN mg/dL 26* 30*   CREATININE mg/dL 2.48* 2.36*   CALCIUM mg/dL 9.5 9.3   GLUCOSE RANDOM mg/dL 106 94   ALT U/L  --  14   AST U/L  --  20   ALK PHOS U/L  --  186*   ALBUMIN g/dL  --  3.9   TOTAL BILIRUBIN mg/dL  --  0.28                       ABG:    VBG:          Micro          Reviewed images on PACS myself  Imaging:   NM lung ventilation / perfusion   Final Result by Wendi Fall MD (07/12 3633)      The probability for pulmonary embolus is low. Workstation performed: [id number]         XR knee 4+ vw right injury   Final Result by Beena Hand MD (07/12 2150)      No acute osseous abnormality. Workstation performed: [id number]         VAS lower limb venous duplex study, complete bilateral   Final Result by Ja Arana MD (07/11 2045)      XR chest 2 views   Final Result by Morna Meigs, MD (07/12 0745)      No acute cardiopulmonary disease.       Resolution of previous CT evident groundglass opacities            Workstation performed: [id number]         XR knee 4+ vw left injury    (Results Pending)   XR foot 3+ vw left    (Results Pending)           Micro:   Lab Results   Component Value Date    URINECX >100,000 cfu/ml Escherichia coli (A) 03/17/2022    URINECX No Growth <1000 cfu/mL 08/26/2021    URINECX 50,000-59,000 cfu/ml 02/06/2020        ____________________________________________________________________

## 2023-07-12 NOTE — PLAN OF CARE
Problem: PAIN - ADULT  Goal: Verbalizes/displays adequate comfort level or baseline comfort level  Description: Interventions:  - Encourage patient to monitor pain and request assistance  - Assess pain using appropriate pain scale  - Administer analgesics based on type and severity of pain and evaluate response  - Implement non-pharmacological measures as appropriate and evaluate response  - Consider cultural and social influences on pain and pain management  - Notify physician/advanced practitioner if interventions unsuccessful or patient reports new pain  Outcome: Progressing     Problem: INFECTION - ADULT  Goal: Absence or prevention of progression during hospitalization  Description: INTERVENTIONS:  - Assess and monitor for signs and symptoms of infection  - Monitor lab/diagnostic results  - Monitor all insertion sites, i.e. indwelling lines, tubes, and drains  - Fenton appropriate cooling/warming therapies per order  - Administer medications as ordered  - Instruct and encourage patient and family to use good hand hygiene technique  - Identify and instruct in appropriate isolation precautions for identified infection/condition  Outcome: Progressing     Problem: DISCHARGE PLANNING  Goal: Discharge to home or other facility with appropriate resources  Description: INTERVENTIONS:  - Identify barriers to discharge w/patient and caregiver  - Arrange for needed discharge resources and transportation as appropriate  - Identify discharge learning needs (meds, wound care, etc.)  - Arrange for interpretive services to assist at discharge as needed  - Refer to Case Management Department for coordinating discharge planning if the patient needs post-hospital services based on physician/advanced practitioner order or complex needs related to functional status, cognitive ability, or social support system  Outcome: Progressing

## 2023-07-12 NOTE — ASSESSMENT & PLAN NOTE
· Swelling in left lower extremity. No edema noted on exam.   · Pt reports she fell onto her knees in the past week due to tripping over dog. Denies head strike or LOC. Swelling started shortly after. · Pt is to use walker but is noncompliant  · Xray of left ankle performed outpatient on 7/6. No fracture noted   · BNP WNL   · Duplex study negative   · Recommend elevating extremities and icing when able   · Order xray of bilateral knees. Check L foot xr.   · PT/OT   · Continue to monitor

## 2023-07-12 NOTE — ASSESSMENT & PLAN NOTE
· Presents from home due to dyspnea on exertion and swelling in her left lower extremity over the past 3-4 days  · Patient on RA without tachycardia or tachypnea  · BNP and troponin WNL  · D-dimer elevated at 1.24  · Unable to have CTA chest due to CKD 4. · Bilateral duplex study performed. Negative for DVTs  ER unable to schedule VQ scan. Admitted under observation for V/Q. Recent CT chest with concerns for possible ILD. Will ask pulm to evaluate. Recent stress test reviewed and negative. No chest pain or anginal symptoms.   · Due to patient being hemodynamically stable hold off on starting anticoagulation at this time

## 2023-07-12 NOTE — ASSESSMENT & PLAN NOTE
Lab Results   Component Value Date    EGFR 20 07/12/2023    EGFR 21 07/11/2023    EGFR 19 04/22/2023    CREATININE 2.48 (H) 07/12/2023    CREATININE 2.36 (H) 07/11/2023    CREATININE 2.59 (H) 04/22/2023   · Creatinine 2.36 (GFR 21) on admission  · Baseline appears to be about 2.3-2.7  · Continue to monitor

## 2023-07-12 NOTE — PHYSICAL THERAPY NOTE
PHYSICAL THERAPY SCREEN NOTE      Patient Name: Zac PATTERSON Date: 7/12/2023 07/12/23 3536   Note Type   Note type Screen   Additional Comments PT orders received, chart review performed. Per ED RN, pt has been independently ambulating around ED. There is a note that pt w/ mechanical falls (tripping over dog), but has been noncompliant w/ using RW at home. Recommend pt goes to OPPT for balance/strengthening to decrease risk of falls. Given pt is ambulatory, no acute PT needs, will DC PT.        Mathews Severs, PT, DPT

## 2023-07-12 NOTE — CASE MANAGEMENT
Case Management Assessment & Discharge Planning Note    Patient name Ramana Rad  Location ED 07/ED 07 MRN [id number]  : 1960 Date 2023       Current Admission Date: 2023  Current Admission Diagnosis:Shortness of breath   Patient Active Problem List    Diagnosis Date Noted   • Shortness of breath 2023   • Leg swelling 2023   • Obesity (BMI 30-39.9)    • Umbilical hernia    • Pes anserinus bursitis of both knees 2023   • Obesity, morbid ([address]) 10/10/2022   • Chronic back pain    • Severe depressed bipolar I disorder without psychotic features ([address]) 2022   • Mixed obsessional thoughts and acts 2022   • Eating disorder, unspecified 2022   • Pain of right upper extremity 2022   • Continuous opioid dependence ([address]) 2022   • Injury of right thumb 2022   • End stage renal disease ([address]) 2022   • GERD (gastroesophageal reflux disease)    • Acute shoulder pain 2021   • Accelerated hypertension 2021   • Nausea 2021   • Aftercare following surgery of the musculoskeletal system 10/27/2021   • Closed Colles' fracture of right radius 10/05/2021   • Claw toe, acquired, right 2021   • Injury of plantar plate, right, initial encounter 2021   • Acquired hallux interphalangeus, right 2021   • Anxiety    • Family history of cardiac disorder in father 2021   • Left knee tendonitis 2021   • Effusion of right knee 2021   • Hallux valgus, right 2020   • Acquired hallux interphalangeus of right foot 2020   • Bilateral sacroiliitis ([address]) 2020   • CKD (chronic kidney disease) stage 4, GFR 15-29 ml/min ([facility]) 2020   • Right patellofemoral syndrome 2020   • AVF (arteriovenous fistula) ([address]) 2020   • Steal syndrome dialysis vascular access ([address]) 2019   • Primary osteoarthritis of left knee 10/15/2019   • Moderate persistent asthma without complication 22/35/8304   • Thyroid nodule 08/14/2019   • Abnormal thyroid exam 06/17/2019   • Hyperphosphatemia 06/13/2019   • Abnormal chest CT 06/05/2019   • Infarction of left basal ganglia ([address]) 05/01/2019   • Benign neoplasm of colon 04/02/2019   • Drug-induced constipation 04/02/2019   • Hyperparathyroidism ([address]) 03/19/2019   • Bipolar 1 disorder, depressed, severe ([address]) 01/23/2019   • Obsessive compulsive disorder 01/23/2019   • Panic disorder with agoraphobia 01/23/2019   • Eating disorder 01/23/2019   • Primary hypertension 01/02/2019   • Hyperprolactinemia ([address]) 01/02/2019   • Elevated LFTs 11/13/2018   • Rectal prolapse 07/13/2018   • Idiopathic chronic pancreatitis ([address]) 03/20/2018   • Primary osteoarthritis of right knee 03/20/2018   • Age-related osteoporosis without current pathological fracture 03/20/2018   • Cardiac murmur 03/20/2018   • Renal cyst 02/20/2018   • Vitamin D deficiency 12/20/2017   • Secondary renal hyperparathyroidism ([address]) 12/20/2017   • Spondylolisthesis at L5-S1 level 01/20/2017   • Hypercholesteremia 01/17/2017   • Herniated nucleus pulposus, L5-S1 11/23/2015      LOS (days): 0  Geometric Mean LOS (GMLOS) (days):   Days to GMLOS:     OBJECTIVE:              Current admission status: Observation  Referral Reason: Other (post acute needs)    Preferred Pharmacy:   [address] [place] [address] - [address]   [address] [place] [address]  Phone: [phone] Fax: [facility], [address] [facility] [id number], [address] [facility], [address] [facility], [address], [address] [address]  Phone: [phone] Fax: [phone]    [address]  [address] [facility]  [place] [address]  Phone: [phone] Fax: [phone]    [facility] ([address] [place] [address]  [address]  [address] [facility] [address]  Phone: [phone] Fax: [address], [place] [address] [address]  Phone: [phone] Fax: [phone]    [facility] ([facility] ) - [place], [address]  [address] [facility] [place]  Phone: [phone] Fax: [phone]    Primary Care Provider: Levon Reynolds DO    Primary Insurance: MEDICARE  Secondary Insurance:     ASSESSMENT:  Lenny Proxies    There are no active Health Care Proxies on file. Advance Directives  Does patient have a [address]?: No  Was patient offered paperwork?: Yes (declined)  Does patient currently have a Health Care decision maker?: Yes, please see Health Care Proxy section  Does patient have Advance Directives?: No  Was patient offered paperwork?: Yes (declined)  Primary Contact: Fifi Driver    Obs Notice Signed: 07/12/23    Readmission Root Cause  30 Day Readmission: No    Patient Information  Admitted from[de-identified] Home  Mental Status: Alert  During Assessment patient was accompanied by: Not accompanied during assessment  Assessment information provided by[de-identified] Patient  Primary Caregiver: Self  Support Systems: Family members  [place] of Res[facility]: [address] [address] do you live in?: [address] [facility] entry access options.  Select all that apply.: No steps to enter home  Type of Current Residence: 3 story home  Upon entering residence, is there a bedroom on the main floor (no further steps)?: No  A bedroom is located on the following floor levels of residence (select all that apply):: 3rd Floor  Upon entering residence, is there a bathroom on the main floor (no further steps)?: Yes  Number of steps to 3rd floor from main floor: One Flight  In the last 12 months, was there a time when you were not able to pay the mortgage or rent on time?: No  In the last 12 months, how many places have you lived?: 1  In the last 12 months, was there a time when you did not have a steady place to sleep or slept in a shelter (including now)?: No  Homeless/housing insecurity resource given?: N/A  Living Arrangements: Other (Comment) (lives with sister)  Is patient a ?: No    Activities of Daily Living Prior to Admission  Functional Status: Independent  Completes ADLs independently?: Yes  Ambulates independently?: Yes  Does patient use assisted devices?: No  Does patient currently own DME?: Yes  What DME does the patient currently own?: [employer]  Does patient have a history of Outpatient Therapy (PT/OT)?: Yes  Does the patient have a history of Short-Term Rehab?: No  Does patient have a history of HHC?: No  Does patient currently have [facility]?: No         Patient Information Continued  Income Source: SSI/SSD  Does patient have prescription coverage?: Yes  Within the past 12 months, you worried that your food would run out before you got the money to buy more.: Never true  Within the past 12 months, the food you bought just didn't last and you didn't have money to get more.: Never true  Food insecurity resource given?: N/A  Does patient receive dialysis treatments?: No  Does patient have a history of substance abuse?: No  Does patient have a history of Mental Health Diagnosis?: Yes  Is patient receiving treatment for mental health?: Yes  Has patient received inpatient treatment related to mental health in the last 2 years?: No         Means of Transportation  Means of Transport to Appts[de-identified] Drives Self  In the past 12 months, has lack of transportation kept you from medical appointments or from getting medications?: No  In the past 12 months, has lack of transportation kept you from meetings, work, or from getting things needed for daily living?: No  Was application for public transport provided?: N/A        DISCHARGE DETAILS:    Discharge planning discussed with[de-identified] patient  Freedom of Choice: Yes  Comments - Freedom of Choice: Disucssed dc planning and role of CM  CM contacted family/caregiver?: No- see comments (pt alert and indpt in room)  Were Treatment Team discharge recommendations reviewed with patient/caregiver?: Yes  Did patient/caregiver verbalize understanding of patient care needs?: Yes       Contacts  Reason/Outcome: Discharge [address]         Is the patient interested in [facility] at discharge?: Yes  [facility] requested[de-identified] Occupational Therapy, Physical Therapy  HHA External Referral Reason (only applicable if external HHA name selected): Patient has established relationship with provider  [address] Provider[de-identified] PCP  Home Health Services Needed[de-identified] Evaluate Functional Status and Safety, Gait/ADL Training, Strengthening/Theraputic Exercises to Improve Function  Homebound Criteria Met[de-identified] Uses an Assist Device (i.e. cane, walker, etc)  Supporting Clincal Findings[de-identified] Fatigues Easliy in [employer], Limited Endurance    DME Referral Provided  Referral made for DME?: No    Other Referral/Resources/Interventions Provided:  Interventions: HHC, SNF  Referral Comments: Pt/OT screened pt as it was reported she is walking indpt. Pt reports she is struggling to walk and that she is not able to get up her stairs at home to go to bed. She will at least need [facility]            Discharge Destination Plan[de-identified] Home with [address] [facility], Short Term Rehab  Transport at Discharge : Other (Comment) (TBD)                                      Additional Comments: Cm met with pt in the ED. Pt reports that she lives with her sister in a th with no yoselin. Her bedroom is on the third floor and pt is not able to go up the stairs to sleep in her bed. PT has been staying on the couch on the first floor. Pt reports that she has a walker at home but does not require it. Pt has an inhaler that she does use. Pt has ambulated in ED but will likely need new PT/OT assessment once planning for dc. Pt has no hx of HHC/STR/DA. Pt has participated with Op Pt/Ot before. She reports a hx of MH tx. Pt with bipolar d/o and working with [facility]. Pt reports she has been IP for MH before. She has not been IP for over 5 years and reports she did have ECT tx. Pt sees [facility] PCP and uses [facility] DR KARTHIKEYAN HIGGINBOTHAM. Pt also follows with  Nephrology OP. Pt reports that she can drive but also indicates a lot of difficulty with ambualtion and pain.

## 2023-07-12 NOTE — PROGRESS NOTES
[address]  Progress Note  Name: Yen James  MRN: [id number]  Unit/Bed#: ED 07 I Date of Admission: 7/11/2023   Date of Service: 7/12/2023 I Hospital Day: 0    Assessment/Plan   Obesity (BMI 30-39. 9)  Assessment & Plan  · BMI 35.55  · Encourage lifestyle changes    Leg swelling  Assessment & Plan  · Swelling in left lower extremity. No edema noted on exam.   · Pt reports she fell onto her knees in the past week due to tripping over dog. Denies head strike or LOC. Swelling started shortly after. · Pt is to use walker but is noncompliant  · Xray of left ankle performed outpatient on 7/6. No fracture noted   · BNP WNL   · Duplex study negative   · Recommend elevating extremities and icing when able   · Order xray of bilateral knees. Check L foot xr. · PT/OT   · Continue to monitor     Accelerated hypertension  Assessment & Plan  · Home regimen: Amlodipine, carvedilol  · Normotensive in the ER thus far. Continue to monitor    CKD (chronic kidney disease) stage 4, GFR 15-29 ml/min [facility])  Assessment & Plan  Lab Results   Component Value Date    EGFR 20 07/12/2023    EGFR 21 07/11/2023    EGFR 19 04/22/2023    CREATININE 2.48 (H) 07/12/2023    CREATININE 2.36 (H) 07/11/2023    CREATININE 2.59 (H) 04/22/2023   · Creatinine 2.36 (GFR 21) on admission  · Baseline appears to be about 2.3-2.7  · Continue to monitor    Bipolar 1 disorder, depressed, severe (HCC)  Assessment & Plan  · Home regimen: Luvox and Seroquel    * Shortness of breath  Assessment & Plan  · Presents from home due to dyspnea on exertion and swelling in her left lower extremity over the past 3-4 days  · Patient on RA without tachycardia or tachypnea  · BNP and troponin WNL  · D-dimer elevated at 1.24  · Unable to have CTA chest due to CKD 4. · Bilateral duplex study performed. Negative for DVTs  ER unable to schedule VQ scan. Admitted under observation for V/Q. Recent CT chest with concerns for possible ILD. Will ask pulm to evaluate. Recent stress test reviewed and negative. No chest pain or anginal symptoms. · Due to patient being hemodynamically stable hold off on starting anticoagulation at this time            VTE  Prophylaxis:   Pharmacologic: in place  Mechanical VTE Prophylaxis in Place: Yes    Patient Centered Rounds: I have performed bedside rounds with nursing staff today. Discussions with Specialists or Other Care Team Provider: case management    Education and Discussions with Family / Patient: pt      Current Length of Stay: 0 day(s)    Current Patient Status: Observation        Code Status: Level 1 - Full Code    Discharge Plan: Pt will require continued inpatient hospitalization. Subjective:   Pt is not hypoxic but does not feel that her symptoms are better still with dyspnea. No cough. Patient is seen and examined at bedside. All other ROS are negative. Objective:     Vitals:   Temp (24hrs), Av.5 °F (36.4 °C), Min:97.5 °F (36.4 °C), Max:97.5 °F (36.4 °C)    Temp:  [97.5 °F (36.4 °C)] 97.5 °F (36.4 °C)  HR:  [75-92] 92  Resp:  [13-22] 17  BP: (113-153)/(54-89) 128/60  SpO2:  [88 %-98 %] 94 %  There is no height or weight on file to calculate BMI.      Input and Output Summary (last 24 hours):     No intake or output data in the 24 hours ending 23 1205    Physical Exam:       GEN: No acute distress, comfortable, obese  HEEENT: No JVD, PERRLA, no scleral icterus  RESP: Lungs clear to auscultation bilaterally  CV: RRR, +s1/s2   ABD: SOFT NON TENDER, POSITIVE BOWEL SOUNDS, NO DISTENTION  PSYCH: CALM  NEURO: Mentation baseline, NO FOCAL DEFICITS  SKIN: NO RASH  EXTREM: NO EDEMA    Additional Data:     Labs:    Results from last 7 days   Lab Units 23  0555   WBC Thousand/uL 5.30   HEMOGLOBIN g/dL 10.4*   HEMATOCRIT % 33.8*   PLATELETS Thousands/uL 191   NEUTROS PCT % 53   LYMPHS PCT % 26   MONOS PCT % 12   EOS PCT % 8*     Results from last 7 days   Lab Units 23  0555 07/11/23  1546   SODIUM mmol/L 143 138   POTASSIUM mmol/L 4.4 5.0   CHLORIDE mmol/L 110* 108   CO2 mmol/L 26 23   BUN mg/dL 26* 30*   CREATININE mg/dL 2.48* 2.36*   ANION GAP mmol/L 7 7   CALCIUM mg/dL 9.5 9.3   ALBUMIN g/dL  --  3.9   TOTAL BILIRUBIN mg/dL  --  0.28   ALK PHOS U/L  --  186*   ALT U/L  --  14   AST U/L  --  20   GLUCOSE RANDOM mg/dL 106 94             Results from last 7 days   Lab Units 07/05/23  1209   HGBA1C % 5.6           Lines/Drains:  Invasive Devices     Line  Duration           Hemodialysis AV Fistula 11/18/19 Left  1332 days                Telemetry:   Telemetry Orders (From admission, onward)             24 Hour Telemetry Monitoring  (ED Bridging Orders Panel)  Continuous x 24 Hours (Telem)        Question:  Reason for 24 Hour Telemetry  Answer:  Pulmonary Embolism                    * I Have Reviewed All Lab Data Listed Above. Imaging:     Results for orders placed during the hospital encounter of 04/22/23    XR Trauma chest portable    Narrative  CHEST    INDICATION:   TRAUMA. COMPARISON:  Chest x-ray March 16, 2023    EXAM PERFORMED/VIEWS:  XR CHEST PORTABLE      FINDINGS:    Cardiomediastinal silhouette appears unremarkable. Bilateral upper lobe interstitial opacities and low lung volumes. No pneumothorax or pleural effusion. Osseous structures appear within normal limits for patient age. Impression  Interstitial opacities and low lung volumes. No pneumothorax. Workstation performed: [id number]    Results for orders placed during the hospital encounter of 07/11/23    XR chest 2 views    Narrative  CHEST    INDICATION:   SOB.    COMPARISON: 4/22/2023    EXAM PERFORMED/VIEWS:  XR CHEST PA & LATERAL  Images: 4    FINDINGS:    Cardiomediastinal silhouette appears unremarkable. The lungs appear clear. Previous groundglass opacities appear to have resolved. No pneumothorax or pleural effusion.     Osseous structures appear within normal limits for patient age. Impression  No acute cardiopulmonary disease. Resolution of previous CT evident groundglass opacities        Workstation performed: [id number]      *I have reviewed all imaging reports listed above      Recent Cultures (last 7 days):           Last 24 Hours Medication List:   Current Facility-Administered Medications   Medication Dose Route Frequency Provider Last Rate   • acetaminophen  650 mg Oral Q6H PRN Jorge Alberto Felder PA-C     • albuterol  2 puff Inhalation Q6H PRN Jorge Alberto Felder PA-C     • AMILoride  5 mg Oral BID Jorge Alberto Felder PA-C     • amLODIPine  5 mg Oral Daily Jorge Alberto Felder PA-C     • aspirin  81 mg Oral Daily Jorge Alberto Felder PA-C     • atorvastatin  20 mg Oral Daily With Dinner Jorge Alberto Felder PA-C     • budesonide-formoterol  2 puff Inhalation BID Jorge Alberto Felder PA-C     • carvedilol  25 mg Oral BID With Meals Jorge Alberto Felder PA-C     • cholecalciferol  5,000 Units Oral Daily Blanca Mcintyre PA-C     • clonazePAM  0.5 mg Oral TID Jorge Alberto Felder PA-C     • fluvoxaMINE  100 mg Oral Daily Blanca Mcintyre PA-C     • fluvoxaMINE  200 mg Oral HS Blanca Mcintyre PA-C     • heparin (porcine)  5,000 Units Subcutaneous Q8H [address] Blanca Mcintyre PA-C     • lamoTRIgine  200 mg Oral BID Jorge Alberto Felder PA-C     • pantoprazole  40 mg Oral Early Morning Blanca Mcintyre PA-C     • QUEtiapine  300 mg Oral Daily Blanca Mcintyre PA-C     • QUEtiapine  500 mg Oral HS Blanca Mcintyre PA-C     • traMADol  50 mg Oral Q12H PRN Jorge Alberto Felder PA-C          Today, Patient Was Seen By: Maddie Arizmendi MD    ** Please Note: Dictation voice to text software may have been used in the creation of this document.  **

## 2023-07-12 NOTE — OCCUPATIONAL THERAPY NOTE
Occupational Therapy Screen    Patient Name: Vishal WALDRONA Date: 7/12/2023 07/12/23 0803   OT Last Visit   OT Visit Date 07/12/23   Note Type   Note type Screen   Additional Comments OT orders received and chart reviewed. Spoke to Tristian Graham via TT who states pt is currently independent. Pt also documented as ambulatory to bathroom in chart. Recommend pt continue to be OOB for meals, ambulation to/from BR, perform self care tasks, and mobility in hallway with nursing. At this time, OT recommendations at time of discharge are return home at prior level of function.  No acute OT needs identified at this time; please re-consult if OT needs arise during remainder of hospital stay     [facility], [place], OTR/L

## 2023-07-13 ENCOUNTER — APPOINTMENT (INPATIENT)
Dept: MRI IMAGING | Facility: HOSPITAL | Age: 63
DRG: 189 | End: 2023-07-13
Payer: MEDICARE

## 2023-07-13 PROBLEM — R29.6 FALLS: Status: ACTIVE | Noted: 2023-07-13
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
## 2023-07-13 LAB
ALBUMIN SERPL BCP-MCNC: 3.9 G/DL (ref 3.5–5)
ALP SERPL-CCNC: 173 U/L (ref 34–104)
ALT SERPL W P-5'-P-CCNC: 14 U/L (ref 7–52)
ANION GAP SERPL CALCULATED.3IONS-SCNC: 6 MMOL/L
AST SERPL W P-5'-P-CCNC: 17 U/L (ref 13–39)
BASOPHILS # BLD AUTO: 0.05 THOUSANDS/ÂΜL (ref 0–0.1)
BASOPHILS NFR BLD AUTO: 1 % (ref 0–1)
BILIRUB SERPL-MCNC: 0.29 MG/DL (ref 0.2–1)
BUN SERPL-MCNC: 26 MG/DL (ref 5–25)
CALCIUM SERPL-MCNC: 9.7 MG/DL (ref 8.4–10.2)
CHLORIDE SERPL-SCNC: 108 MMOL/L (ref 96–108)
CO2 SERPL-SCNC: 27 MMOL/L (ref 21–32)
CREAT SERPL-MCNC: 2.62 MG/DL (ref 0.6–1.3)
EOSINOPHIL # BLD AUTO: 0.32 THOUSAND/ÂΜL (ref 0–0.61)
EOSINOPHIL NFR BLD AUTO: 6 % (ref 0–6)
ERYTHROCYTE [DISTWIDTH] IN BLOOD BY AUTOMATED COUNT: 12.8 % (ref 11.6–15.1)
GFR SERPL CREATININE-BSD FRML MDRD: 19 ML/MIN/1.73SQ M
GLUCOSE SERPL-MCNC: 104 MG/DL (ref 65–140)
HCT VFR BLD AUTO: 35.6 % (ref 34.8–46.1)
HGB BLD-MCNC: 10.8 G/DL (ref 11.5–15.4)
IMM GRANULOCYTES # BLD AUTO: 0.02 THOUSAND/UL (ref 0–0.2)
IMM GRANULOCYTES NFR BLD AUTO: 0 % (ref 0–2)
LYMPHOCYTES # BLD AUTO: 1.41 THOUSANDS/ÂΜL (ref 0.6–4.47)
LYMPHOCYTES NFR BLD AUTO: 26 % (ref 14–44)
MCH RBC QN AUTO: 30.9 PG (ref 26.8–34.3)
MCHC RBC AUTO-ENTMCNC: 30.3 G/DL (ref 31.4–37.4)
MCV RBC AUTO: 102 FL (ref 82–98)
MONOCYTES # BLD AUTO: 0.52 THOUSAND/ÂΜL (ref 0.17–1.22)
MONOCYTES NFR BLD AUTO: 10 % (ref 4–12)
NEUTROPHILS # BLD AUTO: 3.13 THOUSANDS/ÂΜL (ref 1.85–7.62)
NEUTS SEG NFR BLD AUTO: 57 % (ref 43–75)
NRBC BLD AUTO-RTO: 0 /100 WBCS
PLATELET # BLD AUTO: 199 THOUSANDS/UL (ref 149–390)
PMV BLD AUTO: 9.9 FL (ref 8.9–12.7)
POTASSIUM SERPL-SCNC: 4.3 MMOL/L (ref 3.5–5.3)
PROT SERPL-MCNC: 6.5 G/DL (ref 6.4–8.4)
RBC # BLD AUTO: 3.49 MILLION/UL (ref 3.81–5.12)
SODIUM SERPL-SCNC: 141 MMOL/L (ref 135–147)
WBC # BLD AUTO: 5.45 THOUSAND/UL (ref 4.31–10.16)

## 2023-07-13 PROCEDURE — 99222 1ST HOSP IP/OBS MODERATE 55: CPT | Performed by: INTERNAL MEDICINE

## 2023-07-13 PROCEDURE — 99232 SBSQ HOSP IP/OBS MODERATE 35: CPT | Performed by: HOSPITALIST

## 2023-07-13 PROCEDURE — 80053 COMPREHEN METABOLIC PANEL: CPT | Performed by: HOSPITALIST

## 2023-07-13 PROCEDURE — 70551 MRI BRAIN STEM W/O DYE: CPT

## 2023-07-13 PROCEDURE — 85025 COMPLETE CBC W/AUTO DIFF WBC: CPT | Performed by: HOSPITALIST

## 2023-07-13 RX ADMIN — HEPARIN SODIUM 5000 UNITS: 5000 INJECTION INTRAVENOUS; SUBCUTANEOUS at 13:40

## 2023-07-13 RX ADMIN — CARVEDILOL 25 MG: 12.5 TABLET, FILM COATED ORAL at 08:43

## 2023-07-13 RX ADMIN — LAMOTRIGINE 200 MG: 100 TABLET ORAL at 08:38

## 2023-07-13 RX ADMIN — HEPARIN SODIUM 5000 UNITS: 5000 INJECTION INTRAVENOUS; SUBCUTANEOUS at 06:49

## 2023-07-13 RX ADMIN — HEPARIN SODIUM 5000 UNITS: 5000 INJECTION INTRAVENOUS; SUBCUTANEOUS at 22:19

## 2023-07-13 RX ADMIN — ASPIRIN 81 MG CHEWABLE TABLET 81 MG: 81 TABLET CHEWABLE at 08:37

## 2023-07-13 RX ADMIN — AMILORIDE HYDROCLORIDE 5 MG: 5 TABLET ORAL at 08:46

## 2023-07-13 RX ADMIN — CARVEDILOL 25 MG: 12.5 TABLET, FILM COATED ORAL at 17:17

## 2023-07-13 RX ADMIN — AMILORIDE HYDROCLORIDE 5 MG: 5 TABLET ORAL at 17:17

## 2023-07-13 RX ADMIN — TRAMADOL HYDROCHLORIDE 50 MG: 50 TABLET, COATED ORAL at 08:55

## 2023-07-13 RX ADMIN — BUDESONIDE AND FORMOTEROL FUMARATE DIHYDRATE 2 PUFF: 160; 4.5 AEROSOL RESPIRATORY (INHALATION) at 22:19

## 2023-07-13 RX ADMIN — PANTOPRAZOLE SODIUM 40 MG: 40 TABLET, DELAYED RELEASE ORAL at 06:49

## 2023-07-13 RX ADMIN — FLUVOXAMINE MALEATE 100 MG: 100 TABLET ORAL at 08:43

## 2023-07-13 RX ADMIN — AMLODIPINE BESYLATE 5 MG: 5 TABLET ORAL at 08:38

## 2023-07-13 RX ADMIN — QUETIAPINE FUMARATE 300 MG: 300 TABLET ORAL at 08:38

## 2023-07-13 RX ADMIN — ATORVASTATIN CALCIUM 20 MG: 20 TABLET, FILM COATED ORAL at 17:17

## 2023-07-13 RX ADMIN — CLONAZEPAM 0.5 MG: 0.5 TABLET ORAL at 22:20

## 2023-07-13 RX ADMIN — LAMOTRIGINE 200 MG: 100 TABLET ORAL at 22:18

## 2023-07-13 RX ADMIN — CLONAZEPAM 0.5 MG: 0.5 TABLET ORAL at 08:38

## 2023-07-13 RX ADMIN — CLONAZEPAM 0.5 MG: 0.5 TABLET ORAL at 17:17

## 2023-07-13 RX ADMIN — QUETIAPINE 500 MG: 200 TABLET ORAL at 22:18

## 2023-07-13 RX ADMIN — Medication 5000 UNITS: at 08:38

## 2023-07-13 RX ADMIN — FLUVOXAMINE MALEATE 200 MG: 100 TABLET ORAL at 22:18

## 2023-07-13 NOTE — PROGRESS NOTES
[address] [facility]  Progress Note  Name: Zuly Pereyra  MRN: [id number]  Unit/Bed#: -14 I Date of Admission: 7/11/2023   Date of Service: 7/13/2023 I Hospital Day: 1    Assessment/Plan   Falls  Assessment & Plan  Pt cleared by PT  Notes ongoing unsteadiness, dizziness today. Will exclude central event. Await MRI. Obesity (BMI 30-39. 9)  Assessment & Plan  · BMI 35.55  · Encourage lifestyle changes    Leg swelling  Assessment & Plan  · Swelling in left lower extremity. No edema noted on exam.   · Pt reports she fell onto her knees in the past week due to tripping over dog. Denies head strike or LOC. Swelling started shortly after. · Pt is to use walker but is noncompliant  · Xray of left ankle performed outpatient on 7/6. No fracture noted   · BNP WNL   · Duplex study negative   · Recommend elevating extremities and icing when able   · Order xray of bilateral knees. Check L foot xr. Pending. · PT/OT - screened out  · Continue to monitor     Accelerated hypertension  Assessment & Plan  · Home regimen: Amlodipine, carvedilol  · Normotensive in the ER thus far. Continue to monitor    CKD (chronic kidney disease) stage 4, GFR 15-29 ml/min [facility])  Assessment & Plan  Lab Results   Component Value Date    EGFR 19 07/13/2023    EGFR 20 07/12/2023    EGFR 21 07/11/2023    CREATININE 2.62 (H) 07/13/2023    CREATININE 2.48 (H) 07/12/2023    CREATININE 2.36 (H) 07/11/2023   · Creatinine 2.36 (GFR 21) on admission  · Baseline appears to be about 2.3-2.7  · Continue to monitor. Nephrology following.      Bipolar 1 disorder, depressed, severe ([address])  Assessment & Plan  · Home regimen: Luvox and Seroquel    * Shortness of breath  Assessment & Plan  · Presents from home due to dyspnea on exertion and swelling in her left lower extremity over the past 3-4 days  · Patient on RA without tachycardia or tachypnea  · BNP and troponin WNL  · D-dimer elevated at 1.24  · Unable to have CTA chest due to CKD 4.   · Bilateral duplex study performed. Negative for DVTs  VQ negative. Pulmonary following. GGO felt to be resolved from their standpoint. Recent stress test reviewed and negative. No chest pain or anginal symptoms. · Due to patient being hemodynamically stable hold off on starting anticoagulation at this time  · May have component of SPENCER. VTE  Prophylaxis:   Pharmacologic: in place  Mechanical VTE Prophylaxis in Place: Yes    Patient Centered Rounds: I have performed bedside rounds with nursing staff today. Discussions with Specialists or Other Care Team Provider: case management    Education and Discussions with Family / Patient: pt    Current Length of Stay: 1 day(s)    Current Patient Status: Inpatient        Code Status: Level 1 - Full Code    Discharge Plan: Pt will require continued inpatient hospitalization. Subjective:   Pt states breathing about the same no severe dyspnea but is mild. Reports concerns with falls, dizziness. Patient is seen and examined at bedside. All other ROS are negative. Objective:     Vitals:   Temp (24hrs), Av.1 °F (36.7 °C), Min:97.8 °F (36.6 °C), Max:98.5 °F (36.9 °C)    Temp:  [97.8 °F (36.6 °C)-98.5 °F (36.9 °C)] 98.1 °F (36.7 °C)  HR:  [74-87] 74  Resp:  [16-19] 19  BP: (113-146)/(60-80) 121/62  SpO2:  [87 %-98 %] 98 %  Body mass index is 35.56 kg/m². Input and Output Summary (last 24 hours):        Intake/Output Summary (Last 24 hours) at 2023 1212  Last data filed at 2023 1100  Gross per 24 hour   Intake 180 ml   Output --   Net 180 ml       Physical Exam:       GEN: No acute distress, comfortable  HEEENT: No JVD, PERRLA, no scleral icterus  RESP: Lungs clear to auscultation bilaterally  CV: RRR, +s1/s2   ABD: SOFT NON TENDER, POSITIVE BOWEL SOUNDS, NO DISTENTION  PSYCH: CALM  NEURO: Mentation baseline, NO FOCAL DEFICITS  SKIN: NO RASH  EXTREM: NO EDEMA    Additional Data:     Labs:    Results from last 7 days   Lab Units 07/13/23  0525   WBC Thousand/uL 5.45   HEMOGLOBIN g/dL 10.8*   HEMATOCRIT % 35.6   PLATELETS Thousands/uL 199   NEUTROS PCT % 57   LYMPHS PCT % 26   MONOS PCT % 10   EOS PCT % 6     Results from last 7 days   Lab Units 07/13/23  0525   SODIUM mmol/L 141   POTASSIUM mmol/L 4.3   CHLORIDE mmol/L 108   CO2 mmol/L 27   BUN mg/dL 26*   CREATININE mg/dL 2.62*   ANION GAP mmol/L 6   CALCIUM mg/dL 9.7   ALBUMIN g/dL 3.9   TOTAL BILIRUBIN mg/dL 0.29   ALK PHOS U/L 173*   ALT U/L 14   AST U/L 17   GLUCOSE RANDOM mg/dL 104                       Lines/Drains:  Invasive Devices     Peripheral Intravenous Line  Duration           Peripheral IV 07/12/23 Right Antecubital <1 day          Line  Duration           Hemodialysis AV Fistula 11/18/19 Left  1333 days                Telemetry:        * I Have Reviewed All Lab Data Listed Above. Imaging:     Results for orders placed during the hospital encounter of 04/22/23    XR Trauma chest portable    Narrative  CHEST    INDICATION:   TRAUMA. COMPARISON:  Chest x-ray March 16, 2023    EXAM PERFORMED/VIEWS:  XR CHEST PORTABLE      FINDINGS:    Cardiomediastinal silhouette appears unremarkable. Bilateral upper lobe interstitial opacities and low lung volumes. No pneumothorax or pleural effusion. Osseous structures appear within normal limits for patient age. Impression  Interstitial opacities and low lung volumes. No pneumothorax. Workstation performed: [id number]    Results for orders placed during the hospital encounter of 07/11/23    XR chest 2 views    Narrative  CHEST    INDICATION:   SOB.    COMPARISON: 4/22/2023    EXAM PERFORMED/VIEWS:  XR CHEST PA & LATERAL  Images: 4    FINDINGS:    Cardiomediastinal silhouette appears unremarkable. The lungs appear clear. Previous groundglass opacities appear to have resolved. No pneumothorax or pleural effusion. Osseous structures appear within normal limits for patient age.     Impression  No acute cardiopulmonary disease. Resolution of previous CT evident groundglass opacities        Workstation performed: [id number]      *I have reviewed all imaging reports listed above      Recent Cultures (last 7 days):           Last 24 Hours Medication List:   Current Facility-Administered Medications   Medication Dose Route Frequency Provider Last Rate   • acetaminophen  650 mg Oral Q6H PRN Davon Kearney PA-C     • albuterol  2 puff Inhalation Q6H PRN Davon Kearney PA-C     • AMILoride  5 mg Oral BID Davon Kearney PA-C     • amLODIPine  5 mg Oral Daily Davon Kearney PA-C     • aspirin  81 mg Oral Daily Davon Kearney PA-C     • atorvastatin  20 mg Oral Daily With Dinner Davon Kearney PA-C     • budesonide-formoterol  2 puff Inhalation BID Davon Kearney PA-C     • carvedilol  25 mg Oral BID With Meals Davon Kearney PA-C     • cholecalciferol  5,000 Units Oral Daily Blanca Mcintyre PA-C     • clonazePAM  0.5 mg Oral TID Davon Kearney PA-C     • fluvoxaMINE  100 mg Oral Daily Blanca Mcintyre PA-C     • fluvoxaMINE  200 mg Oral HS Blanca Mcintyre PA-C     • heparin (porcine)  5,000 Units Subcutaneous Q8H [address] Blanca Mcintyre PA-C     • lamoTRIgine  200 mg Oral BID Davon Kearney PA-C     • pantoprazole  40 mg Oral Early Morning Blanca Mcintyre PA-C     • QUEtiapine  300 mg Oral Daily Blanca Mcintyre PA-C     • QUEtiapine  500 mg Oral HS Blanca Mcintyre PA-C     • traMADol  50 mg Oral Q12H PRN Davon Kearney PA-C          Today, Patient Was Seen By: Susan Soto MD    ** Please Note: Dictation voice to text software may have been used in the creation of this document.  **

## 2023-07-13 NOTE — CONSULTS
Consultation - Nephrology   Faith Loretta 66 y.o. female MRN: [id number]  Unit/Bed#: -01 Encounter: [id number]    ASSESSMENT and PLAN:  Chronic kidney disease IV:    Etiology: Suspect secondary to hypertension previous volume depletion as well as previous lithium use  • Baseline creatinine 2.5-2.8 EGFR 18  • Admission creatinine 2.48  • Current creatinine 2.62 EGFR 19  • Patient follows with Dr. Carmela Hightower  • Function is at baseline and examining euvolemic  • Not on ACE inhibitor/ARB/diuretics at home  • Patient being evaluated for transplant at \[facility]\""  • No changes in treatment recommended  • No urgent indication for RRT therapy at this time  • Would avoid IV contrast if possible however if needed would recommend IV fluids pre and post  • Follow-up with Dr. Carmela Hightower on 7/25/2023 schedule    Access: Left wrist AV fistula-positive bruit and thrill  · No blood pressures or IV sticks in left arm    Blood pressure/hypertension:  • Current BP acceptable  • Home medications include: Amiloride 5 mg 2 times daily, amlodipine 5 mg daily, carvedilol 25 mg times daily  • Current medications include: Amiloride 5 mg 2 times daily, amlodipine 5 mg daily, carvedilol 25 mg 2 times daily,  • Maximize hemodynamics to maintain MAP >65  • Avoid hypotension or fluctuations in blood pressure  • Will continue to trend    H&H/anemia: Likely of CKD  • Current hemoglobin 10.8-stable  • Management per primary team  • Transfuse if hemoglobin less than 7.0    Volume status/shortness of breath  · Patient examining euvolemic  · Lungs are clear, no lower extremity edema noted, No JVD exam  · Chest x-ray reviewed personally no vascular congestion noted  • No need for diuretics at this time  • V/Q scan done low probability for PE    Bipolar disorder:  • Previously on lithium in her 20s currently off  • Likely component of chronic interstitial nephritis associated     Cyst of right kidneys-continue to monitor as outpatient  · Previous CT scan from March 2022 was unremarkable of the kidneys without hydronephrosis    Disposition: Patient stable from nephrology standpoint patient has CKD 4 and renal function remained stable. Patient examining euvolemic. Okay for discharge from nephrology standpoint when medically ready per primary team.  Has follow-up scheduled with Dr. Luis M Leone on 7/25/2023. 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 A renal consultation is requested today for CKD stage IV. On discussion the patient states normally does not have swelling but feels this has been recent over the last couple of weeks. Denies any urinary issues however since admitted not going to the bathroom as much.      PAST MEDICAL HISTORY:  Past Medical History:   Diagnosis Date   • Anxiety    • Anxiety disorder    • Arthritis    • At risk for falls    • Bipolar 2 disorder ([address])    • Chronic back pain    • Chronic kidney disease    • Closed fracture of distal end of right fibula with routine healing 11/04/2020   • COVID-19     in Jan 2021   • CVA (cerebral vascular accident) [facility])     noted on MRI in the past   • Depression    • GERD (gastroesophageal reflux disease)    • Hypercholesteremia    • Hypernatremia    • Hypertension    • Hypokalemia    • Idiopathic chronic pancreatitis ([address]) 03/20/2018   • Intervertebral disc disorder with radiculopathy of lumbosacral region     resolved: 12/28/2015   • Kidney disease    • Kidney disease    • Limb alert care status     LUE-fistula   • Panic attacks    • Pericardial effusion    • PONV (postoperative nausea and vomiting)    • Psychiatric problem    • Radiculitis     resolved: 2015   • Secondary renal hyperparathyroidism ([address])    • Stroke [facility])    • Vitamin D deficiency        PAST SURGICAL HISTORY:  Past Surgical History:   Procedure Laterality Date   • BUNIONECTOMY      Left foot    • CAST APPLICATION Right     Procedure: Application short-arm thumb spica splint;  Surgeon: Rosetta Cabello MD;  Location: [facility];  Service: Orthopedics   • COLON SURGERY     • COLONOSCOPY  2021   • DILATION AND CURETTAGE OF UTERUS     • INDUCED       surgically induced   • MA ARTERIOVENOUS ANASTOMOSIS OPEN DIRECT Left 2019    Procedure: CREATION FISTULA ARTERIOVENOUS (AV) left wrists possible left upper;  Surgeon: Mami Orosco MD;  Location: [facility];  Service: Vascular   • MA CORRJ [address] W/SESMDC W/DIST Jonna Kavon Left 2019    Procedure: [place] [employer];  Surgeon: Miller Berkowitz DPM;  Location: [facility];  Service: Podiatry   • MA CORRJ [address] W/SESMDC W/DIST Jonna Kavon Right 8/3/2020    Procedure: Fe Donate;  Surgeon: Miller Berkowitz DPM;  Location: [facility];  Service: Podiatry   • MA CORRJ HALLUX VALGUS W/SESMDC W/PROX PHLNX OSTEOT Right 2021    Procedure: Tony Sabal, right tameka osteotomy and 2nd claw toe correction;  Surgeon: Cosme Jones MD;  Location: [facility];  Service: Orthopedics   • MA ERCP DX COLLECTION SPECIMEN BRUSHING/WASHING N/A 2018    Procedure: ENDOSCOPIC RETROGRADE CHOLANGIOPANCREATOGRAPHY (ERCP);   Surgeon: Mars Casarez MD;  Location: [facility];  Service: Gastroenterology   • MA LAPAROSCOPY PROCTOPEXY PROLAPSE N/A 2018    Procedure: ROBOTIC SIGMOID RESECTION / RECTOPEXY;  Surgeon: Emeterio Malloy MD;  Location: [facility];  Service: Colorectal   • MA OPEN TREATMENT RADIAL SHAFT FRACTURE Right 10/11/2021    Procedure: OPEN REDUCTION W/ INTERNAL FIXATION (ORIF) RADIUS (WRIST), RIGHT DISTAL;  Surgeon: River Marshall MD;  Location: [facility];  Service: Orthopedics   • WA REMOVAL IMPLANT DEEP Right 6/23/2022    Procedure: Removal of hardware volar aspect right distal radius (distal radial plate and screws);   Surgeon: Mckenna Cordoba MD;  Location: [facility];  Service: Orthopedics   • WA SIGMOIDOSCOPY FLX DX W/COLLJ SPEC BR/WA IF PFRMD N/A 7/13/2018    Procedure: SIGMOIDOSCOPY FLEXIBLE;  Surgeon: Orien Buerger, MD;  [facility];  Service: Colorectal   • WA TR TDN RESTORE INTRNSC FUNCJ ALL 4 FNGRS Right 6/23/2022    Procedure: Right ring finger flexor digitorum superficialis to flexor pollicis longus tendon transfer;  Surgeon: Mckenna Cordoba MD;  Location: [facility];  Service: Orthopedics   • TUBAL LIGATION Bilateral 1997   • US GUIDED THYROID BIOPSY  7/30/2019       ALLERGIES:  Allergies   Allergen Reactions   • Molds & Smuts Nasal Congestion   • Bee Pollen Nasal Congestion     Other reaction(s): Nasal Congestion   • Pollen Extract Nasal Congestion       SOCIAL HISTORY:  Social History     Substance and Sexual Activity   Alcohol Use Never     Social History     Substance and Sexual Activity   Drug Use Not Currently     Social History     Tobacco Use   Smoking Status Never   Smokeless Tobacco Never       FAMILY HISTORY:  Family History   Problem Relation Age of Onset   • Bipolar disorder Mother    • Mental illness Mother         depression   • Stroke Mother    • Dementia Mother    • Colon polyps Mother    • Heart disease Father    • Hypertension Father    • Diabetes Father    • Other Family         Back disorder   • Diabetes Family    • Heart disease Family    • Hypertension Family    • Stroke Family    • Thyroid disease Family    • Breast cancer Paternal Grandmother         age unknown   • Breast cancer Paternal Aunt         age unknown   • Breast cancer Maternal Aunt         age unknown   • Mental illness Sister    • Colon polyps Sister    • Mental illness Sister    • Heart disease Sister    • No Known Problems Sister    • Breast cancer Sister 76   • Other Son         pituitary tumor   • Hypertension Son    • Obesity Son    • No Known Problems Son    • No Known Problems Maternal Grandmother    • No Known Problems Maternal Grandfather    • No Known Problems Paternal Grandfather    • Breast cancer Paternal Aunt         age unknown   • Substance Abuse Neg Hx         neg fam hx   • Colon cancer Neg Hx        MEDICATIONS:    Current Facility-Administered Medications:   •  acetaminophen (TYLENOL) tablet 650 mg, 650 mg, Oral, Q6H PRN, Blanca Mcintyre PA-C, 650 mg at 07/12/23 1452  •  albuterol (PROVENTIL HFA,VENTOLIN HFA) inhaler 2 puff, 2 puff, Inhalation, Q6H PRN, Adilene DoneSHASHANK  •  AMILoride tablet 5 mg, 5 mg, Oral, BID, Blanca Mcintyre PA-C, 5 mg at 07/13/23 0846  •  amLODIPine (NORVASC) tablet 5 mg, 5 mg, Oral, Daily, Blanca Mcintyre PA-C, 5 mg at 07/13/23 7262  •  aspirin chewable tablet 81 mg, 81 mg, Oral, Daily, Blanca Mcintyre PA-C, 81 mg at 07/13/23 3449  •  atorvastatin (LIPITOR) tablet 20 mg, 20 mg, Oral, Daily With Dinner, Adilene Done, PA-PAULINA, 20 mg at 07/12/23 1703  •  budesonide-formoterol (SYMBICORT) 160-4.5 mcg/act inhaler 2 puff, 2 puff, Inhalation, BID, Blanca Mcintyre PA-C, 2 puff at 07/12/23 2149  •  carvedilol (COREG) tablet 25 mg, 25 mg, Oral, BID With Meals, Adilene Done, PA-PAULINA, 25 mg at 07/13/23 0843  •  cholecalciferol (VITAMIN D3) tablet 5,000 Units, 5,000 Units, Oral, Daily, Blanca Mcintyre PA-C, 5,000 Units at 07/13/23 0838  •  clonazePAM (KlonoPIN) tablet 0.5 mg, 0.5 mg, Oral, TID, Blanca Mcintyre PA-C, 0.5 mg at 07/13/23 8129  •  fluvoxaMINE (LUVOX) tablet 100 mg, 100 mg, Oral, Daily, Blanca Mcintyre PA-C, 100 mg at 07/13/23 0843  •  fluvoxaMINE (LUVOX) tablet 200 mg, 200 mg, Oral, HS, Blanca Mcintyre PA-C, 200 mg at 07/12/23 2127  •  heparin (porcine) subcutaneous injection 5,000 Units, 5,000 Units, Subcutaneous, Q8H [facility], 5,000 Units at 07/13/23 0649 **AND** Platelet count, , , Once, Maribel Mcneil PA-C  •  lamoTRIgine (LaMICtal) tablet 200 mg, 200 mg, Oral, BID, Blanca Mcintyre PA-C, 200 mg at 07/13/23 8836  •  pantoprazole (PROTONIX) EC tablet 40 mg, 40 mg, Oral, Early Morning, Blanca Mcintyre PA-C, 40 mg at 07/13/23 9810  •  QUEtiapine (SEROquel) tablet 300 mg, 300 mg, Oral, Daily, Blanca Mcintyre PA-C, 300 mg at 07/13/23 4108  •  QUEtiapine (SEROquel) tablet 500 mg, 500 mg, Oral, HS, Blanca Mcintyre PA-C, 500 mg at 07/12/23 2128  •  traMADol (ULTRAM) tablet 50 mg, 50 mg, Oral, Q12H PRN, Maribel Mcneil PA-C, 50 mg at 07/13/23 0855      REVIEW OF SYSTEMS:  Patient seen and examined at bedside  Review of Systems   Constitutional: Negative. HENT: Negative. Eyes: Negative. Respiratory: Negative. Cardiovascular: Positive for leg swelling. Trace leg swelling since fall   Gastrointestinal: Negative. Endocrine: Negative. Genitourinary: Negative. Musculoskeletal:        Knee pain and left chest/shoulder pain   Skin: Negative. Neurological: Negative. Hematological: Negative. Psychiatric/Behavioral: Negative. PHYSICAL EXAM:  Current weight: Weight - Scale: 103 kg (227 lb 1.2 oz)  Vitals:    07/12/23 2105 07/12/23 2120 07/12/23 2210 07/13/23 0723   BP: 113/60   121/62   BP Location: Right arm      Pulse: 83 84 81 74   Resp: 18   19   Temp: 98.5 °F (36.9 °C)   98.1 °F (36.7 °C)   TempSrc: Oral      SpO2: 90% (!) 87% 93% 98%   Weight:       Height:           Physical Exam  Vitals and nursing note reviewed. Constitutional:       Appearance: Normal appearance. HENT:      Head: Normocephalic and atraumatic. Nose: Nose normal.      Mouth/Throat:      Mouth: Mucous membranes are moist.      Pharynx: Oropharynx is clear. Eyes:      Extraocular Movements: Extraocular movements intact.       Conjunctiva/sclera: Conjunctivae normal.   Cardiovascular:      Rate and Rhythm: Normal rate and regular rhythm. Pulses: Normal pulses. Heart sounds: Normal heart sounds. Pulmonary:      Effort: Pulmonary effort is normal.   Abdominal:      General: Bowel sounds are normal.      Palpations: Abdomen is soft. Musculoskeletal:         General: Normal range of motion. Cervical back: Normal range of motion and neck supple. Comments: No significant lower extremity edema noted   Skin:     General: Skin is warm and dry. Neurological:      General: No focal deficit present. Mental Status: She is alert and oriented to person, place, and time. Psychiatric:         Mood and Affect: Mood normal.         Behavior: Behavior normal.           Invasive Devices:        Lab Results:   Results from last 7 days   Lab Units 07/13/23  0525 07/12/23  0555 07/11/23  1546 07/11/23  1505   WBC Thousand/uL 5.45 5.30  --  6.12   HEMOGLOBIN g/dL 10.8* 10.4*  --  10.7*   HEMATOCRIT % 35.6 33.8*  --  34.8   PLATELETS Thousands/uL 199 191  --  231   SODIUM mmol/L 141 143 138  --    POTASSIUM mmol/L 4.3 4.4 5.0  --    CHLORIDE mmol/L 108 110* 108  --    CO2 mmol/L 27 26 23  --    BUN mg/dL 26* 26* 30*  --    CREATININE mg/dL 2.62* 2.48* 2.36*  --    CALCIUM mg/dL 9.7 9.5 9.3  --    ALK PHOS U/L 173*  --  186*  --    ALT U/L 14  --  14  --    AST U/L 17  --  20  --          Portions of the record may have been created with voice recognition software. Occasional wrong word or "sound a like" substitutions may have occurred due to the inherent limitations of voice recognition software.  Read the chart carefully and recognize,

## 2023-07-13 NOTE — ASSESSMENT & PLAN NOTE
Lab Results   Component Value Date    EGFR 19 07/13/2023    EGFR 20 07/12/2023    EGFR 21 07/11/2023    CREATININE 2.62 (H) 07/13/2023    CREATININE 2.48 (H) 07/12/2023    CREATININE 2.36 (H) 07/11/2023   · Creatinine 2.36 (GFR 21) on admission  · Baseline appears to be about 2.3-2.7  · Continue to monitor. Nephrology following.

## 2023-07-13 NOTE — ASSESSMENT & PLAN NOTE
· Presents from home due to dyspnea on exertion and swelling in her left lower extremity over the past 3-4 days  · Patient on RA without tachycardia or tachypnea  · BNP and troponin WNL  · D-dimer elevated at 1.24  · Unable to have CTA chest due to CKD 4. · Bilateral duplex study performed. Negative for DVTs  VQ negative. Pulmonary following. GGO felt to be resolved from their standpoint. Recent stress test reviewed and negative. No chest pain or anginal symptoms. · Due to patient being hemodynamically stable hold off on starting anticoagulation at this time  · May have component of SPENCER.

## 2023-07-13 NOTE — ASSESSMENT & PLAN NOTE
· Swelling in left lower extremity. No edema noted on exam.   · Pt reports she fell onto her knees in the past week due to tripping over dog. Denies head strike or LOC. Swelling started shortly after. · Pt is to use walker but is noncompliant  · Xray of left ankle performed outpatient on 7/6. No fracture noted   · BNP WNL   · Duplex study negative   · Recommend elevating extremities and icing when able   · Order xray of bilateral knees. Check L foot xr. Pending.   · PT/OT - screened out  · Continue to monitor

## 2023-07-13 NOTE — ASSESSMENT & PLAN NOTE
Pt cleared by PT  Notes ongoing unsteadiness, dizziness today. Will exclude central event. Await MRI.

## 2023-07-13 NOTE — PLAN OF CARE
Problem: PAIN - ADULT  Goal: Verbalizes/displays adequate comfort level or baseline comfort level  Description: Interventions:  - Encourage patient to monitor pain and request assistance  - Assess pain using appropriate pain scale  - Administer analgesics based on type and severity of pain and evaluate response  - Implement non-pharmacological measures as appropriate and evaluate response  - Consider cultural and social influences on pain and pain management  - Notify physician/advanced practitioner if interventions unsuccessful or patient reports new pain  Outcome: Progressing     Problem: INFECTION - ADULT  Goal: Absence or prevention of progression during hospitalization  Description: INTERVENTIONS:  - Assess and monitor for signs and symptoms of infection  - Monitor lab/diagnostic results  - Monitor all insertion sites, i.e. indwelling lines, tubes, and drains  - [place] appropriate cooling/warming therapies per order  - Administer medications as ordered  - Instruct and encourage patient and family to use good hand hygiene technique  - Identify and instruct in appropriate isolation precautions for identified infection/condition  Outcome: Progressing     Problem: SAFETY ADULT  Goal: Patient will remain free of falls  Description: INTERVENTIONS:  - Educate patient/family on patient safety including physical limitations  - Instruct patient to call for assistance with activity   - Consult OT/PT to assist with strengthening/mobility   - Keep Call bell within reach  - Keep bed low and locked with side rails adjusted as appropriate  - Keep care items and personal belongings within reach  - Initiate and maintain comfort rounds  - Make Fall Risk Sign visible to staff  - Offer Toileting every 2 Hours, in advance of need  - Initiate/Maintain bed alarm  - Obtain necessary fall risk management equipment: alarm, socks  - Apply yellow socks and bracelet for high fall risk patients  - Consider moving patient to room near nurses station  Outcome: Progressing  Goal: Maintain or return to baseline ADL function  Description: INTERVENTIONS:  -  Assess patient's ability to carry out ADLs; assess patient's baseline for ADL function and identify physical deficits which impact ability to perform ADLs (bathing, care of mouth/teeth, toileting, grooming, dressing, etc.)  - Assess/evaluate cause of self-care deficits   - Assess range of motion  - Assess patient's mobility; develop plan if impaired  - Assess patient's need for assistive devices and provide as appropriate  - Encourage maximum independence but intervene and supervise when necessary  - Involve family in performance of ADLs  - Assess for home care needs following discharge   - Consider OT consult to assist with ADL evaluation and planning for discharge  - Provide patient education as appropriate  Outcome: Progressing  Goal: Maintains/Returns to pre admission functional level  Description: INTERVENTIONS:  - Perform BMAT or MOVE assessment daily.   - Set and communicate daily mobility goal to care team and patient/family/caregiver. - Collaborate with rehabilitation services on mobility goals if consulted  - Perform Range of Motion 3 times a day. - Reposition patient every 2 hours.   - Dangle patient 3 times a day  - Stand patient 3 times a day  - Ambulate patient 3 times a day  - Out of bed to chair 3 times a day   - Out of bed for meals 3 times a day  - Out of bed for toileting  - Record patient progress and toleration of activity level   Outcome: Progressing     Problem: DISCHARGE PLANNING  Goal: Discharge to home or other facility with appropriate resources  Description: INTERVENTIONS:  - Identify barriers to discharge w/patient and caregiver  - Arrange for needed discharge resources and transportation as appropriate  - Identify discharge learning needs (meds, wound care, etc.)  - Arrange for interpretive services to assist at discharge as needed  - Refer to Case Management Department for coordinating discharge planning if the patient needs post-hospital services based on physician/advanced practitioner order or complex needs related to functional status, cognitive ability, or social support system  Outcome: Progressing     Problem: Knowledge Deficit  Goal: Patient/family/caregiver demonstrates understanding of disease process, treatment plan, medications, and discharge instructions  Description: Complete learning assessment and assess knowledge base.   Interventions:  - Provide teaching at level of understanding  - Provide teaching via preferred learning methods  Outcome: Progressing

## 2023-07-14 VITALS
HEIGHT: 67 IN | BODY MASS INDEX: 35.64 KG/M2 | TEMPERATURE: 97.7 F | DIASTOLIC BLOOD PRESSURE: 66 MMHG | OXYGEN SATURATION: 94 % | HEART RATE: 85 BPM | RESPIRATION RATE: 19 BRPM | WEIGHT: 227.07 LBS | SYSTOLIC BLOOD PRESSURE: 128 MMHG

## 2023-07-14 PROBLEM — J96.01 ACUTE RESPIRATORY FAILURE WITH HYPOXIA (HCC): Status: ACTIVE | Noted: 2023-07-14

## 2023-07-14 PROCEDURE — 99239 HOSP IP/OBS DSCHRG MGMT >30: CPT | Performed by: PHYSICIAN ASSISTANT

## 2023-07-14 RX ADMIN — Medication 5000 UNITS: at 09:22

## 2023-07-14 RX ADMIN — PANTOPRAZOLE SODIUM 40 MG: 40 TABLET, DELAYED RELEASE ORAL at 05:47

## 2023-07-14 RX ADMIN — HEPARIN SODIUM 5000 UNITS: 5000 INJECTION INTRAVENOUS; SUBCUTANEOUS at 05:47

## 2023-07-14 RX ADMIN — CARVEDILOL 25 MG: 12.5 TABLET, FILM COATED ORAL at 09:29

## 2023-07-14 RX ADMIN — FLUVOXAMINE MALEATE 100 MG: 100 TABLET ORAL at 09:22

## 2023-07-14 RX ADMIN — CLONAZEPAM 0.5 MG: 0.5 TABLET ORAL at 09:22

## 2023-07-14 RX ADMIN — QUETIAPINE FUMARATE 300 MG: 300 TABLET ORAL at 09:21

## 2023-07-14 RX ADMIN — TRAMADOL HYDROCHLORIDE 50 MG: 50 TABLET, COATED ORAL at 01:02

## 2023-07-14 RX ADMIN — ASPIRIN 81 MG CHEWABLE TABLET 81 MG: 81 TABLET CHEWABLE at 09:22

## 2023-07-14 RX ADMIN — AMILORIDE HYDROCLORIDE 5 MG: 5 TABLET ORAL at 09:22

## 2023-07-14 RX ADMIN — LAMOTRIGINE 200 MG: 100 TABLET ORAL at 09:22

## 2023-07-14 RX ADMIN — AMLODIPINE BESYLATE 5 MG: 5 TABLET ORAL at 09:22

## 2023-07-14 NOTE — ASSESSMENT & PLAN NOTE
· Swelling in left lower extremity. No edema noted on exam.   · Pt reports she fell onto her knees in the past week due to tripping over dog. Denies head strike or LOC. Swelling started shortly after. XRs without fx.   · BNP WNL   · Duplex study negative   · Recommend elevating extremities and icing when able

## 2023-07-14 NOTE — PLAN OF CARE
Problem: PAIN - ADULT  Goal: Verbalizes/displays adequate comfort level or baseline comfort level  Description: Interventions:  - Encourage patient to monitor pain and request assistance  - Assess pain using appropriate pain scale  - Administer analgesics based on type and severity of pain and evaluate response  - Implement non-pharmacological measures as appropriate and evaluate response  - Consider cultural and social influences on pain and pain management  - Notify physician/advanced practitioner if interventions unsuccessful or patient reports new pain  Outcome: Progressing     Problem: INFECTION - ADULT  Goal: Absence or prevention of progression during hospitalization  Description: INTERVENTIONS:  - Assess and monitor for signs and symptoms of infection  - Monitor lab/diagnostic results  - Monitor all insertion sites, i.e. indwelling lines, tubes, and drains  - [place] appropriate cooling/warming therapies per order  - Administer medications as ordered  - Instruct and encourage patient and family to use good hand hygiene technique  - Identify and instruct in appropriate isolation precautions for identified infection/condition  Outcome: Progressing     Problem: Knowledge Deficit  Goal: Patient/family/caregiver demonstrates understanding of disease process, treatment plan, medications, and discharge instructions  Description: Complete learning assessment and assess knowledge base.   Interventions:  - Provide teaching at level of understanding  - Provide teaching via preferred learning methods  Outcome: Progressing     Problem: DISCHARGE PLANNING  Goal: Discharge to home or other facility with appropriate resources  Description: INTERVENTIONS:  - Identify barriers to discharge w/patient and caregiver  - Arrange for needed discharge resources and transportation as appropriate  - Identify discharge learning needs (meds, wound care, etc.)  - Arrange for interpretive services to assist at discharge as needed  - Refer to Case Management Department for coordinating discharge planning if the patient needs post-hospital services based on physician/advanced practitioner order or complex needs related to functional status, cognitive ability, or social support system  Outcome: Progressing

## 2023-07-14 NOTE — ASSESSMENT & PLAN NOTE
· Patient is cleared by PT  · Notes ongoing unsteadiness, dizziness. MRI brain no acute stroke. Old known stroke.

## 2023-07-14 NOTE — ASSESSMENT & PLAN NOTE
Lab Results   Component Value Date    EGFR 19 07/13/2023    EGFR 20 07/12/2023    EGFR 21 07/11/2023    CREATININE 2.62 (H) 07/13/2023    CREATININE 2.48 (H) 07/12/2023    CREATININE 2.36 (H) 07/11/2023     · Creatinine 2.36 (GFR 21) on admission  · Baseline appears to be about 2.3-2.7  · Continue to monitor. Nephrology signed off.

## 2023-07-14 NOTE — DISCHARGE INSTR - AVS FIRST PAGE
Dear Andrei Murphy,     It was our pleasure to care for you here at [facility], [address] At this time we provide for you here, the most important instructions / recommendations at discharge:     Notable Medication Adjustments -   None  Testing Required after Discharge -   None  Important follow up information -   If you call the primary care office to make a hospital follow up appointment within 10 days of the hospital discharge, the office visit payment should be rolled into the hospital bill expense. Please try your best to arrange the follow up appointment with your primary care. Other Instructions -   None  Please review this entire after visit summary as additional general instructions including medication list, appointments, activity, diet, any pertinent wound care, and other additional recommendations from your care team that may be provided for you.       Sincerely,     Lolis Powers PA-C

## 2023-07-14 NOTE — DISCHARGE SUMMARY
[address] [facility]  Discharge- Kathrin Aranda 1960, 66 y.o. female MRN: [id number]  Unit/Bed#: -Bruno Encounter: [id number]  Primary Care Provider: Hipolito Coelho DO   Date and time admitted to hospital: 7/11/2023  2:34 PM    * Shortness of breath  Assessment & Plan  · Pulmonary following; [place] noted on previous CAT scans most likely represents pulmonary edema, resolved on current imaging. · Recent stress test reviewed and negative. No chest pain or anginal symptoms. · D-dimer was elevated. VAS duplex bl le and VQ scan were negative. No AC. · May have component of SPENCER. · Nephrology was consulted to discuss the need for diuretic given her end stage CKD. No need for diuretic right now, they signed off. Falls  Assessment & Plan  · Patient is cleared by PT  · Notes ongoing unsteadiness, dizziness. MRI brain no acute stroke. Old known stroke. Obesity (BMI 30-39. 9)  Assessment & Plan  · BMI 35.55  · Encourage lifestyle changes    Leg swelling  Assessment & Plan  · Swelling in left lower extremity. No edema noted on exam.   · Pt reports she fell onto her knees in the past week due to tripping over dog. Denies head strike or LOC. Swelling started shortly after. XRs without fx. · BNP WNL   · Duplex study negative   · Recommend elevating extremities and icing when able    Essential hypertension  Assessment & Plan  · Home regimen: Amlodipine, carvedilol    CKD (chronic kidney disease) stage 4, GFR 15-29 ml/min [facility])  Assessment & Plan  Lab Results   Component Value Date    EGFR 19 07/13/2023    EGFR 20 07/12/2023    EGFR 21 07/11/2023    CREATININE 2.62 (H) 07/13/2023    CREATININE 2.48 (H) 07/12/2023    CREATININE 2.36 (H) 07/11/2023     · Creatinine 2.36 (GFR 21) on admission  · Baseline appears to be about 2.3-2.7  · Continue to monitor. Nephrology signed off.      Bipolar 1 disorder, depressed, severe ([address])  Assessment & Plan  · Home regimen: Luvox and Seroquel        Medical Problems     Resolved Problems  Date Reviewed: 7/13/2023   None       Discharging Physician / Practitioner: Regi Warren PA-C  PCP: Ermelinda Camarena DO  Admission Date:   Admission Orders (From admission, onward)     Ordered        07/12/23 1418  Inpatient Admission  Once            07/11/23 1923  Place in Observation  Once                      Discharge Date: 07/14/23    Consultations During Hospital Stay:  · ***    Procedures Performed:   · ***    Significant Findings / Test Results:   · ***    Incidental Findings:   · ***   · {SLIM Discharge Incidential Findings:78227}    Test Results Pending at Discharge (will require follow up):   · ***     Outpatient Tests Requested:  · ***    Complications:  ***    Reason for Admission: ***    Hospital Course:   Xochilt Echevarria is a 66 y.o. female patient who originally presented to the hospital on 7/11/2023 due to ***    {Complete this smartphrase if the patient is an inpatient and being discharged earlier than 2 midnights. If this does not apply, please delete this line:21127}    Please see above list of diagnoses and related plan for additional information. Condition at Discharge: {Condition:05530}    Discharge Day Visit / Exam:   Subjective:  ***  Vitals: Blood Pressure: 128/66 (07/14/23 0719)  Pulse: 85 (07/14/23 0719)  Temperature: 97.7 °F (36.5 °C) (07/14/23 0719)  Temp Source: Oral (07/13/23 2153)  Respirations: 19 (07/14/23 0719)  Height: 5' 7" (170.2 cm) (07/12/23 1538)  Weight - Scale: 103 kg (227 lb 1.2 oz) (07/12/23 1538)  SpO2: 94 % (07/14/23 0719)  Exam:   Physical Exam ***    Discussion with Family: {Family Communication:26703}    Discharge instructions/Information to patient and family:   See after visit summary for information provided to patient and family. Provisions for Follow-Up Care:  See after visit summary for information related to follow-up care and any pertinent home health orders.        Disposition:   {Disposition on Discharge:41794}    Planned Readmission: ***     Discharge Statement:  I spent *** minutes discharging the patient. This time was spent on the day of discharge. I had direct contact with the patient on the day of discharge. Greater than 50% of the total time was spent examining patient, answering all patient questions, arranging and discussing plan of care with patient as well as directly providing post-discharge instructions. Additional time then spent on discharge activities. Discharge Medications:  See after visit summary for reconciled discharge medications provided to patient and/or family.       **Please Note: This note may have been constructed using a voice recognition system** Thousand/uL      Lymphocytes Absolute 1.41 Thousands/µL      Monocytes Absolute 0.52 Thousand/µL      Eosinophils Absolute 0.32 Thousand/µL      Basophils Absolute 0.05 Thousands/µL     Basic metabolic panel [[id number]]  (Abnormal) Collected: 07/12/23 0555    Lab Status: Final result Specimen: Blood from Arm, Right Updated: 07/12/23 0617     Sodium 143 mmol/L      Potassium 4.4 mmol/L      Chloride 110 mmol/L      CO2 26 mmol/L      ANION GAP 7 mmol/L      BUN 26 mg/dL      Creatinine 2.48 mg/dL      Glucose 106 mg/dL      Calcium 9.5 mg/dL      eGFR 20 ml/min/1.73sq m     Narrative:      National Kidney Disease Foundation guidelines for Chronic Kidney Disease (CKD):   •  Stage 1 with normal or high GFR (GFR > 90 mL/min/1.73 square meters)  •  Stage 2 Mild CKD (GFR = 60-89 mL/min/1.73 square meters)  •  Stage 3A Moderate CKD (GFR = 45-59 mL/min/1.73 square meters)  •  Stage 3B Moderate CKD (GFR = 30-44 mL/min/1.73 square meters)  •  Stage 4 Severe CKD (GFR = 15-29 mL/min/1.73 square meters)  •  Stage 5 End Stage CKD (GFR <15 mL/min/1.73 square meters)  Note: GFR calculation is accurate only with a steady state creatinine    CBC and differential [[id number]]  (Abnormal) Collected: 07/12/23 0555    Lab Status: Final result Specimen: Blood from Arm, Right Updated: 07/12/23 0600     WBC 5.30 Thousand/uL      RBC 3.29 Million/uL      Hemoglobin 10.4 g/dL      Hematocrit 33.8 %       fL      MCH 31.6 pg      MCHC 30.8 g/dL      RDW 12.6 %      MPV 9.9 fL      Platelets 081 Thousands/uL      nRBC 0 /100 WBCs      Neutrophils Relative 53 %      Immat GRANS % 0 %      Lymphocytes Relative 26 %      Monocytes Relative 12 %      Eosinophils Relative 8 %      Basophils Relative 1 %      Neutrophils Absolute 2.84 Thousands/µL      Immature Grans Absolute 0.02 Thousand/uL      Lymphocytes Absolute 1.35 Thousands/µL      Monocytes Absolute 0.62 Thousand/µL      Eosinophils Absolute 0.41 Thousand/µL      Basophils Absolute 0.06 Thousands/µL     HS Troponin I 4hr [[id number]]  (Normal) Collected: 07/11/23 1941    Lab Status: Final result Specimen: Blood from Hand, Right Updated: 07/11/23 2011     hs TnI 4hr 4 ng/L      Delta 4hr hsTnI -1 ng/L     HS Troponin I 2hr [[id number]]  (Normal) Collected: 07/11/23 1815    Lab Status: Final result Specimen: Blood from Hand, Right Updated: 07/11/23 1842     hs TnI 2hr 6 ng/L      Delta 2hr hsTnI 1 ng/L     Urine Microscopic [[id number]]  (Abnormal) Collected: 07/11/23 1627    Lab Status: Final result Specimen: Urine, Clean Catch Updated: 07/11/23 1744     RBC, UA None Seen /hpf      WBC, UA 4-10 /hpf      Epithelial Cells Occasional /hpf      Bacteria, UA Occasional /hpf      OTHER OBSERVATIONS WBCs Clumped    UA w Reflex to Microscopic w Reflex to Culture [[id number]]  (Abnormal) Collected: 07/11/23 1627    Lab Status: Final result Specimen: Urine, Clean Catch Updated: 07/11/23 1710     Color, UA Colorless     Clarity, UA Clear     Specific Gravity, UA 1.010     pH, UA 6.0     Leukocytes, UA Small     Nitrite, UA Negative     Protein, UA Negative mg/dl      Glucose, UA Negative mg/dl      Ketones, UA Negative mg/dl      Urobilinogen, UA <2.0 mg/dl      Bilirubin, UA Negative     Occult Blood, UA Negative    HS Troponin 0hr (reflex protocol) [[id number]]  (Normal) Collected: 07/11/23 1546    Lab Status: Final result Specimen: Blood from Hand, Right Updated: 07/11/23 1622     hs TnI 0hr 5 ng/L     Comprehensive metabolic panel [[id number]]  (Abnormal) Collected: 07/11/23 1546    Lab Status: Final result Specimen: Blood from Hand, Right Updated: 07/11/23 1618     Sodium 138 mmol/L      Potassium 5.0 mmol/L      Chloride 108 mmol/L      CO2 23 mmol/L      ANION GAP 7 mmol/L      BUN 30 mg/dL      Creatinine 2.36 mg/dL      Glucose 94 mg/dL      Calcium 9.3 mg/dL      AST 20 U/L      ALT 14 U/L      Alkaline Phosphatase 186 U/L      Total Protein 6.7 g/dL      Albumin 3.9 g/dL      Total Bilirubin 0.28 mg/dL      eGFR 21 ml/min/1.73sq m     Narrative:      [facility] guidelines for Chronic Kidney Disease (CKD):   •  Stage 1 with normal or high GFR (GFR > 90 mL/min/1.73 square meters)  •  Stage 2 Mild CKD (GFR = 60-89 mL/min/1.73 square meters)  •  Stage 3A Moderate CKD (GFR = 45-59 mL/min/1.73 square meters)  •  Stage 3B Moderate CKD (GFR = 30-44 mL/min/1.73 square meters)  •  Stage 4 Severe CKD (GFR = 15-29 mL/min/1.73 square meters)  •  Stage 5 End Stage CKD (GFR <15 mL/min/1.73 square meters)  Note: GFR calculation is accurate only with a steady state creatinine    B-Type Natriuretic Peptide(BNP) [[id number]]  (Normal) Collected: 07/11/23 1505    Lab Status: Final result Specimen: Blood from Arm, Right Updated: 07/11/23 1548     BNP 56 pg/mL     D-Dimer [[id number]]  (Abnormal) Collected: 07/11/23 1505    Lab Status: Final result Specimen: Blood from Arm, Right Updated: 07/11/23 1527     D-Dimer, Quant 1.24 ug/ml FEU     Narrative: In the evaluation for possible pulmonary embolism, in the appropriate (Well's Score of 4 or less) patient, the age adjusted d-dimer cutoff for this patient can be calculated as:    Age x 0.01 (in ug/mL) for Age-adjusted D-dimer exclusion threshold for a patient over 50 years.     CBC and differential [[id number]]  (Abnormal) Collected: 07/11/23 1505    Lab Status: Final result Specimen: Blood from Arm, Right Updated: 07/11/23 1512     WBC 6.12 Thousand/uL      RBC 3.43 Million/uL      Hemoglobin 10.7 g/dL      Hematocrit 34.8 %       fL      MCH 31.2 pg      MCHC 30.7 g/dL      RDW 12.4 %      MPV 10.5 fL      Platelets 347 Thousands/uL      nRBC 0 /100 WBCs      Neutrophils Relative 64 %      Immat GRANS % 0 %      Lymphocytes Relative 20 %      Monocytes Relative 9 %      Eosinophils Relative 6 %      Basophils Relative 1 %      Neutrophils Absolute 3.90 Thousands/µL      Immature Grans Absolute 0.02 Thousand/uL      Lymphocytes Absolute 1.22 Thousands/µL      Monocytes Absolute 0.52 Thousand/µL      Eosinophils Absolute 0.39 Thousand/µL      Basophils Absolute 0.07 Thousands/µL         Incidental Findings:   · None    Test Results Pending at Discharge (will require follow up): · None     Outpatient Tests Requested:  · None    Complications:  None    Reason for Admission: SOB    History of Present Illness:  Roslyn Gong is a 66 y.o. female with a PMH of hypertension, bipolar, CKD 4, obesity who presents from home due to swelling in her left lower extremity and increasing dyspnea on exertion. Saw her PCP on 7/6 due to having 2 falls with continued discomfort in her left ankle. X-ray was ordered and ultimately negative. PCP recommended patient use her walker which she is noncompliant with. Patient confirms this today in the ER. Since having these falls reports increasing dyspnea. However, patient reporting in the ER that it is harder to get around since having these injuries which she believes may be contributing to her shortness of breath.      ED concern due to elevated D-dimer. Patient with CKD 4 unable to have contrast.  Admitted under observation for V/Q. Low suspicion for PE. Patient denies prior clots. Not on anticoagulation. Hospital Course:   Roslyn Gong is a 66 y.o. female patient who originally presented to the hospital on 7/11/2023 due to SOB. This is likely from transient fluid overload from CKD stage 4 versus OHS versus COPD (not exacerbated). Improved with maintenance inhalers. Did not get diuretic while IP. She had negative VQ scan and VAS duplex bl le to r/o VTE etiology for SOB. Pulm agrees - no need for [facility]. Pulm also reviewed the CT chest and agrees there is no infectious etiology for her SOB. Patient had acceptable pulse ox with ambulation during her hospital stay. No need for O2 at dc.  She had dizziness with ambulation on the day prior to dc and this was investigated with MRI brain which showed old strokes, no new stroke. Patient will be dc to home with OP Fu with the PCP and nephrology. Patient had all of her questions answered and is amenable to dc to home. Please see above list of diagnoses and related plan for additional information. Condition at Discharge: good    Discharge Day Visit / Exam:   Subjective:  Denies dizziness, SOB or cp. Vitals: Blood Pressure: 128/66 (07/14/23 0719)  Pulse: 85 (07/14/23 0719)  Temperature: 97.7 °F (36.5 °C) (07/14/23 0719)  Temp Source: Oral (07/13/23 2153)  Respirations: 19 (07/14/23 0719)  Height: 5' 7" (170.2 cm) (07/12/23 1538)  Weight - Scale: 103 kg (227 lb 1.2 oz) (07/12/23 1538)  SpO2: 94 % (07/14/23 0719)  Exam:   Physical Exam  Vitals and nursing note reviewed. HENT:      Head: Normocephalic and atraumatic. Nose: Nose normal.      Mouth/Throat:      Mouth: Mucous membranes are moist.   Eyes:      Conjunctiva/sclera: Conjunctivae normal.   Neck:      Comments: Thick neck  Cardiovascular:      Rate and Rhythm: Normal rate and regular rhythm. Heart sounds: Normal heart sounds. Pulmonary:      Effort: Pulmonary effort is normal. No respiratory distress. Breath sounds: Normal breath sounds. No stridor. No wheezing, rhonchi or rales. Chest:      Chest wall: No tenderness. Abdominal:      General: Bowel sounds are normal.      Palpations: Abdomen is soft. Musculoskeletal:         General: No swelling. Skin:     General: Skin is warm and dry. Neurological:      General: No focal deficit present. Mental Status: She is alert. Psychiatric:         Behavior: Behavior normal.          Discussion with Family: Patient declined call to . She called her family member. Discharge instructions/Information to patient and family:   See after visit summary for information provided to patient and family.       Provisions for Follow-Up Care:  See after visit summary for information related to follow-up care and any pertinent home health orders. Disposition:   Home    Planned Readmission: none     Discharge Statement:  I spent 65 minutes discharging the patient. This time was spent on the day of discharge. I had direct contact with the patient on the day of discharge. Greater than 50% of the total time was spent examining patient, answering all patient questions, arranging and discussing plan of care with patient as well as directly providing post-discharge instructions. Additional time then spent on discharge activities. Discharge Medications:  See after visit summary for reconciled discharge medications provided to patient and/or family.       **Please Note: This note may have been constructed using a voice recognition system**

## 2023-07-14 NOTE — ASSESSMENT & PLAN NOTE
· Pulmonary following; Groundglass noted on previous CAT scans most likely represents pulmonary edema, resolved on current imaging. · Recent stress test reviewed and negative. No chest pain or anginal symptoms. · D-dimer was elevated. VAS duplex bl le and VQ scan were negative. No AC. · May have component of SPENCER. · Nephrology was consulted to discuss the need for diuretic given her end stage CKD. No need for diuretic right now, they signed off.

## 2023-07-17 ENCOUNTER — TRANSITIONAL CARE MANAGEMENT (OUTPATIENT)
Dept: FAMILY MEDICINE CLINIC | Facility: HOSPITAL | Age: 63
End: 2023-07-17

## 2023-07-17 DIAGNOSIS — Z71.89 COMPLEX CARE COORDINATION: Primary | ICD-10-CM

## 2023-07-19 ENCOUNTER — PATIENT OUTREACH (OUTPATIENT)
Dept: FAMILY MEDICINE CLINIC | Facility: HOSPITAL | Age: 63
End: 2023-07-19

## 2023-07-19 DIAGNOSIS — M79.605 BILATERAL LEG PAIN: ICD-10-CM

## 2023-07-19 DIAGNOSIS — K21.9 GASTROESOPHAGEAL REFLUX DISEASE, UNSPECIFIED WHETHER ESOPHAGITIS PRESENT: ICD-10-CM

## 2023-07-19 DIAGNOSIS — K59.03 DRUG-INDUCED CONSTIPATION: ICD-10-CM

## 2023-07-19 DIAGNOSIS — M79.604 BILATERAL LEG PAIN: ICD-10-CM

## 2023-07-19 DIAGNOSIS — M20.5X1 CLAW TOE, ACQUIRED, RIGHT: ICD-10-CM

## 2023-07-19 DIAGNOSIS — M20.11 ACQUIRED HALLUX INTERPHALANGEUS, RIGHT: ICD-10-CM

## 2023-07-19 DIAGNOSIS — M54.50 LUMBAR PAIN: ICD-10-CM

## 2023-07-19 DIAGNOSIS — S99.921A INJURY OF PLANTAR PLATE, RIGHT, INITIAL ENCOUNTER: ICD-10-CM

## 2023-07-19 RX ORDER — OMEPRAZOLE 40 MG/1
40 CAPSULE, DELAYED RELEASE ORAL
Qty: 90 CAPSULE | Refills: 3 | Status: SHIPPED | OUTPATIENT
Start: 2023-07-19

## 2023-07-19 RX ORDER — ONDANSETRON 4 MG/1
4 TABLET, FILM COATED ORAL EVERY 8 HOURS PRN
Qty: 60 TABLET | Refills: 1 | Status: SHIPPED | OUTPATIENT
Start: 2023-07-19

## 2023-07-19 RX ORDER — TRAMADOL HYDROCHLORIDE 50 MG/1
50 TABLET ORAL EVERY 12 HOURS PRN
Qty: 60 TABLET | Refills: 0 | Status: SHIPPED | OUTPATIENT
Start: 2023-07-19

## 2023-07-19 NOTE — PROGRESS NOTES
Outpatient Care Management Note:    New HRR referral received. Patient was hospitalized from 7/11-7/14 for shortness of breath. Care manager called Renee Monzon, introduced myself and the care management program.  Renee Monzon states that she is doing well. Her legs are still a little swollen. She will try elevating her legs and watching her salt intake. She denies current shortness of breath but does have occasional wheezing. She is using her inhaler as ordered. Med review completed. Renee Monzon states that she has all medications and denies any questions. CM reviewed that Renee Monzon needs to call nephrology and schedule a hospital follow up appt. She will call to schedule. Renee Monzon denies any needs. CM will close the referral. Renee Monzon did take my contact information. She knows to call her PCP office directly for any immediate questions or concerns.

## 2023-07-21 ENCOUNTER — TELEPHONE (OUTPATIENT)
Dept: NEPHROLOGY | Facility: CLINIC | Age: 63
End: 2023-07-21

## 2023-07-26 ENCOUNTER — OFFICE VISIT (OUTPATIENT)
Dept: OBGYN CLINIC | Facility: CLINIC | Age: 63
End: 2023-07-26
Payer: MEDICARE

## 2023-07-26 VITALS
DIASTOLIC BLOOD PRESSURE: 82 MMHG | BODY MASS INDEX: 34.84 KG/M2 | HEIGHT: 67 IN | WEIGHT: 222 LBS | SYSTOLIC BLOOD PRESSURE: 140 MMHG

## 2023-07-26 DIAGNOSIS — M70.51 PES ANSERINUS BURSITIS OF BOTH KNEES: ICD-10-CM

## 2023-07-26 DIAGNOSIS — M70.52 PES ANSERINUS BURSITIS OF BOTH KNEES: ICD-10-CM

## 2023-07-26 DIAGNOSIS — M17.12 PRIMARY OSTEOARTHRITIS OF LEFT KNEE: Primary | ICD-10-CM

## 2023-07-26 DIAGNOSIS — M17.11 PRIMARY OSTEOARTHRITIS OF RIGHT KNEE: ICD-10-CM

## 2023-07-26 PROCEDURE — 99214 OFFICE O/P EST MOD 30 MIN: CPT | Performed by: ORTHOPAEDIC SURGERY

## 2023-07-26 PROCEDURE — 20610 DRAIN/INJ JOINT/BURSA W/O US: CPT | Performed by: ORTHOPAEDIC SURGERY

## 2023-07-26 RX ORDER — BETAMETHASONE SODIUM PHOSPHATE AND BETAMETHASONE ACETATE 3; 3 MG/ML; MG/ML
6 INJECTION, SUSPENSION INTRA-ARTICULAR; INTRALESIONAL; INTRAMUSCULAR; SOFT TISSUE
Status: COMPLETED | OUTPATIENT
Start: 2023-07-26 | End: 2023-07-26

## 2023-07-26 RX ORDER — LIDOCAINE HYDROCHLORIDE 10 MG/ML
7 INJECTION, SOLUTION INFILTRATION; PERINEURAL
Status: COMPLETED | OUTPATIENT
Start: 2023-07-26 | End: 2023-07-26

## 2023-07-26 RX ADMIN — BETAMETHASONE SODIUM PHOSPHATE AND BETAMETHASONE ACETATE 6 MG: 3; 3 INJECTION, SUSPENSION INTRA-ARTICULAR; INTRALESIONAL; INTRAMUSCULAR; SOFT TISSUE at 10:15

## 2023-07-26 RX ADMIN — LIDOCAINE HYDROCHLORIDE 7 ML: 10 INJECTION, SOLUTION INFILTRATION; PERINEURAL at 10:15

## 2023-07-26 NOTE — PROGRESS NOTES
Assessment:     1. Primary osteoarthritis of left knee    2. Primary osteoarthritis of right knee    3. Pes anserinus bursitis of both knees        Plan:     Problem List Items Addressed This Visit        Musculoskeletal and Integument    Primary osteoarthritis of right knee    Relevant Medications    lidocaine (XYLOCAINE) 1 % injection 7 mL (Completed)    betamethasone acetate-betamethasone sodium phosphate (CELESTONE) injection 6 mg (Completed)    lidocaine (XYLOCAINE) 1 % injection 7 mL (Completed)    betamethasone acetate-betamethasone sodium phosphate (CELESTONE) injection 6 mg (Completed)    Other Relevant Orders    Large joint arthrocentesis: bilateral knee (Completed)    Primary osteoarthritis of left knee - Primary    Relevant Medications    lidocaine (XYLOCAINE) 1 % injection 7 mL (Completed)    betamethasone acetate-betamethasone sodium phosphate (CELESTONE) injection 6 mg (Completed)    lidocaine (XYLOCAINE) 1 % injection 7 mL (Completed)    betamethasone acetate-betamethasone sodium phosphate (CELESTONE) injection 6 mg (Completed)    Other Relevant Orders    Large joint arthrocentesis: bilateral knee (Completed)    Pes anserinus bursitis of both knees       Findings today are consistent with mild-moderate bilateral knee osteoarthritis with bilateral pes anserine bursitis. Imaging and prognosis was reviewed with the patient today. Discussed the importance of exercise to minimize pain of arthritis. Cortisone steroid injection was administered today to bilateral knees. Patient should avoid strenuous activities for the next 1-2 days. Patient should avoid vaccines for the next 2 weeks if possible. Can apply cold compress for soreness. If patient feels relief with the cortisone injections, procedure can be repeated every 3-4 months as needed. Patient would benefit from low impact exercises such as stationary biking, swimming and flat surface walking.  She should return to Physical Therapy and cont to follow up with Dr Worthington Ask for her Lumbar radiculopathy. Follow up as needed. All patient's questions were answered to her satisfaction. This note is created using dictation transcription. It may contain typographical errors, grammatical errors, improperly dictated words, background noise and other errors. Subjective:     Patient ID: Jose Manuel Lucia is a 66 y.o. female. Chief Complaint:  63year old female patient presents today for a follow up of bilateral knee pain- osteoarthritis and pes anserine bursitis. Last seen 1/18/2023 for CSI. Pt states that since last visit she has fallen 3x. She presents with bruising on anterior knees as well as her chin. She was seen by Dr Worthington Ask for Lumbar Radiculopathy and refereed to Physical Therapy. She states her legs feel weak when she falls and she does not reports pain as her source of falling. Today she has posterior knee pain. She feels the injections last visit did not help her.      Allergy:  Allergies   Allergen Reactions   • Molds & Smuts Nasal Congestion   • Bee Pollen Nasal Congestion     Other reaction(s): Nasal Congestion   • Pollen Extract Nasal Congestion     Medications:  all current active meds have been reviewed  Past Medical History:  Past Medical History:   Diagnosis Date   • Anxiety    • Anxiety disorder    • Arthritis    • At risk for falls    • Bipolar 2 disorder (HCC)    • Chronic back pain    • Chronic kidney disease    • Closed fracture of distal end of right fibula with routine healing 11/04/2020   • COVID-19     in Jan 2021   • CVA (cerebral vascular accident) [facility])     noted on MRI in the past   • Depression    • GERD (gastroesophageal reflux disease)    • Hypercholesteremia    • Hypernatremia    • Hypertension    • Hypokalemia    • Idiopathic chronic pancreatitis ([address]) 03/20/2018   • Intervertebral disc disorder with radiculopathy of lumbosacral region     resolved: 12/28/2015   • Kidney disease    • Kidney disease    • Limb alert care status LUE-fistula   • Panic attacks    • Pericardial effusion    • PONV (postoperative nausea and vomiting)    • Psychiatric problem    • Radiculitis     resolved: 2015   • Secondary renal hyperparathyroidism ([address])    • Stroke [facility])    • Vitamin D deficiency      Past Surgical History:  Past Surgical History:   Procedure Laterality Date   • BUNIONECTOMY      Left foot    • CAST APPLICATION Right 3/91/2382    Procedure: Application short-arm thumb spica splint;  Surgeon: Rosetta Cabello MD;  Location: [facility];  Service: Orthopedics   • COLON SURGERY     • COLONOSCOPY  2021   • DILATION AND CURETTAGE OF UTERUS     • INDUCED       surgically induced   • TN ARTERIOVENOUS ANASTOMOSIS OPEN DIRECT Left 2019    Procedure: CREATION FISTULA ARTERIOVENOUS (AV) left wrists possible left upper;  Surgeon: Mami Orosco MD;  Location: [facility];  Service: Vascular   • TN CORRJ [address] W/SESMDC W/DIST Jonna Kavon Left 2019    Procedure: Ingomar Coins;  Surgeon: Miller Berkowitz DPM;  Location: [facility];  Service: Podiatry   • [address] W/SESMDC W/DIST Jonna Kavon Right 8/3/2020    Procedure: Junella Donate;  Surgeon: Miller Berkowitz DPM;  Location: [facility];  Service: Podiatry   • TN CORRJ HALLUX VALGUS W/SESMDC W/PROX PHLNX OSTEOT Right 2021    Procedure: Tony Sabal, right tameka osteotomy and 2nd claw toe correction;  Surgeon: Cosme Jones MD;  Location: [facility];  Service: Orthopedics   • TN ERCP DX COLLECTION SPECIMEN BRUSHING/WASHING N/A 2018    Procedure: ENDOSCOPIC RETROGRADE CHOLANGIOPANCREATOGRAPHY (ERCP);   Surgeon: Mars Casarez MD;  Location[facility];  Service: Gastroenterology   • TN LAPAROSCOPY PROCTOPEXY PROLAPSE N/A 2018    Procedure: ROBOTIC SIGMOID RESECTION / RECTOPEXY;  Surgeon: Emeterio Malloy MD;  Location[facility];  Service: Colorectal   • TN OPEN TREATMENT RADIAL SHAFT FRACTURE Right 10/11/2021    Procedure: OPEN REDUCTION W/ INTERNAL FIXATION (ORIF) RADIUS (WRIST), RIGHT DISTAL;  Surgeon: Kezia Altamirano MD;  Location: [facility];  Service: Orthopedics   • MN REMOVAL IMPLANT DEEP Right 6/23/2022    Procedure: Removal of hardware volar aspect right distal radius (distal radial plate and screws);   Surgeon: Carlitos Moraes MD;  Location: [facility];  Service: Orthopedics   • MN SIGMOIDOSCOPY FLX DX W/COLLJ SPEC BR/WA IF PFRMD N/A 7/13/2018    Procedure: Franklin Ruiz;  Surgeon: Amarjit Perera MD;  [facility];  Service: Colorectal   • MN TR TDN RESTORE INTRNSC FUNCJ ALL 4 FNGRS Right 6/23/2022    Procedure: Right ring finger flexor digitorum superficialis to flexor pollicis longus tendon transfer;  Surgeon: Carlitos Moraes MD;  Location: [facility];  Service: Orthopedics   • TUBAL LIGATION Bilateral 1997   • [address] THYROID BIOPSY  7/30/2019     Family History:  Family History   Problem Relation Age of Onset   • Bipolar disorder Mother    • Mental illness Mother         depression   • Stroke Mother    • Dementia Mother    • Colon polyps Mother    • Heart disease Father    • Hypertension Father    • Diabetes Father    • Other Family         Back disorder   • Diabetes Family    • Heart disease Family    • Hypertension Family    • Stroke Family    • Thyroid disease Family    • Breast cancer Paternal Grandmother         age unknown   • Breast cancer Paternal Aunt         age unknown   • Breast cancer Maternal Aunt         age unknown   • Mental illness Sister    • Colon polyps Sister    • Mental illness Sister    • Heart disease Sister    • No Known Problems Sister    • Breast cancer Sister 76   • Other Son         pituitary tumor   • Hypertension Son    • Obesity Son    • No Known Problems Son    • No Known Problems Maternal Grandmother    • No Known Problems Maternal Grandfather    • No Known Problems Paternal Grandfather    • Breast cancer Paternal Aunt         age unknown   • Substance Abuse Neg Hx         neg fam hx   • Colon cancer Neg Hx      Social History:  Social History     Substance and Sexual Activity   Alcohol Use Never     Social History     Substance and Sexual Activity   Drug Use Not Currently     Social History     Tobacco Use   Smoking Status Never   Smokeless Tobacco Never     Review of Systems   Constitutional: Negative. HENT: Negative. Eyes: Negative. Respiratory: Negative. Cardiovascular: Negative. Gastrointestinal: Negative. Endocrine: Negative. Genitourinary: Negative. Musculoskeletal: Positive for arthralgias (Bilateral knee), back pain, gait problem (Antalgic) and joint swelling (Bilateral knee). Skin: Negative. Allergic/Immunologic: Negative. Hematological: Negative. Psychiatric/Behavioral: Negative. Objective:  BP Readings from Last 1 Encounters:   07/26/23 140/82      Wt Readings from Last 1 Encounters:   07/26/23 101 kg (222 lb)      BMI:   Estimated body mass index is 34.77 kg/m² as calculated from the following:    Height as of this encounter: 5' 7" (1.702 m). Weight as of this encounter: 101 kg (222 lb). BSA:   Estimated body surface area is 2.12 meters squared as calculated from the following:    Height as of this encounter: 5' 7" (1.702 m). Weight as of this encounter: 101 kg (222 lb). Physical Exam  Vitals and nursing note reviewed. Constitutional:       Appearance: Normal appearance. She is well-developed. HENT:      Head: Normocephalic and atraumatic. Right Ear: External ear normal.      Left Ear: External ear normal.   Eyes:      Extraocular Movements: Extraocular movements intact. Conjunctiva/sclera: Conjunctivae normal.   Pulmonary:      Effort: Pulmonary effort is normal.   Musculoskeletal:      Cervical back: Neck supple. Right knee: No effusion. Instability Tests: Medial Kateryna test negative and lateral Kateryna test negative. Left knee: No effusion.       Instability Tests: Medial Kateryna test negative and lateral Kateryna test negative. Skin:     General: Skin is warm and dry. Neurological:      Mental Status: She is alert and oriented to person, place, and time. Deep Tendon Reflexes: Reflexes are normal and symmetric. Psychiatric:         Mood and Affect: Mood normal.         Behavior: Behavior normal.       Right Knee Exam     Tenderness   The patient is experiencing tenderness in the medial joint line and pes anserinus. Range of Motion   Extension: 0   Flexion: 120     Tests   Kateryna:  Medial - negative Lateral - negative  Varus: negative Valgus: negative  Patellar apprehension: negative    Other   Erythema: absent  Scars: absent  Sensation: normal  Pulse: present  Swelling: none  Effusion: no effusion present      Left Knee Exam     Tenderness   The patient is experiencing tenderness in the medial joint line and pes anserinus. Range of Motion   Extension: 0   Flexion: 120     Tests   Kateryna:  Medial - negative Lateral - negative  Varus: negative Valgus: negative  Patellar apprehension: negative    Other   Erythema: absent  Scars: absent  Sensation: normal  Pulse: present  Swelling: none  Effusion: no effusion present          Large joint arthrocentesis: bilateral knee  Universal Protocol:  Consent: Verbal consent obtained.   Risks and benefits: risks, benefits and alternatives were discussed  Consent given by: patient  Patient understanding: patient states understanding of the procedure being performed  Site marked: the operative site was marked  Patient identity confirmed: verbally with patient    Supporting Documentation  Indications: pain   Procedure Details  Location: knee - bilateral knee  Preparation: Patient was prepped and draped in the usual sterile fashion  Needle size: 22 G  Ultrasound guidance: no  Approach: anterolateral    Medications (Right): 7 mL lidocaine 1 %; 6 mg betamethasone acetate-betamethasone sodium phosphate 6 (3-3) mg/mLMedications (Left): 7 mL lidocaine 1 %; 6 mg betamethasone acetate-betamethasone sodium phosphate 6 (3-3) mg/mL   Patient tolerance: patient tolerated the procedure well with no immediate complications  Dressing:  Sterile dressing applied        I have personally reviewed pertinent films in PACS and my interpretation is Mild degenerative changes noted in medal and lateral compartments. Moderate degenerative changes noted patellofemoral compartment.      Scribe Attestation    I,:  Quincy Fritz am acting as a scribe while in the presence of the attending physician.:       I,:  Lynette Worthy MD personally performed the services described in this documentation    as scribed in my presence.:

## 2023-07-27 ENCOUNTER — OFFICE VISIT (OUTPATIENT)
Dept: FAMILY MEDICINE CLINIC | Facility: HOSPITAL | Age: 63
End: 2023-07-27
Payer: MEDICARE

## 2023-07-27 VITALS
BODY MASS INDEX: 34.69 KG/M2 | HEIGHT: 67 IN | DIASTOLIC BLOOD PRESSURE: 80 MMHG | OXYGEN SATURATION: 94 % | HEART RATE: 85 BPM | SYSTOLIC BLOOD PRESSURE: 152 MMHG | WEIGHT: 221 LBS

## 2023-07-27 DIAGNOSIS — I35.0 AORTIC STENOSIS, MILD: ICD-10-CM

## 2023-07-27 DIAGNOSIS — J84.9 ILD (INTERSTITIAL LUNG DISEASE) (HCC): ICD-10-CM

## 2023-07-27 DIAGNOSIS — R06.83 SNORING: Primary | ICD-10-CM

## 2023-07-27 DIAGNOSIS — Z78.9 STATIN INTOLERANCE: ICD-10-CM

## 2023-07-27 DIAGNOSIS — I26.99 PULMONARY EMBOLISM WITH INFARCTION (HCC): ICD-10-CM

## 2023-07-27 PROCEDURE — 99495 TRANSJ CARE MGMT MOD F2F 14D: CPT | Performed by: INTERNAL MEDICINE

## 2023-07-27 NOTE — PROGRESS NOTES
Assessment & Plan     1. Snoring  -     Ambulatory Referral to Sleep Medicine; Future    2. Pulmonary embolism with infarction [facility])  Assessment & Plan:  Remote-    Now on aspirin 81 mg daily      3. ILD (interstitial lung disease) ([address] [facility])  Assessment & Plan:  Follows with dr. Nancy Proctor      4. Statin intolerance  Assessment & Plan:  Stopped last month- will repeat lipid in 3 months         Subjective     Transitional Care Management Review:   Cami Beard is a 66 y.o. female here for TCM follow up. During the TCM phone call patient stated:  TCM Call     Date and time call was made  7/17/2023 11:52 AM    Hospital care reviewed  Records reviewed    Patient was hospitialized at  [facility]    Date of Admission  07/11/23    Date of discharge  07/14/23    Diagnosis  ADMITTING DX--SOB,  D/C DX ACTIVE PROBLEMS. Disposition  Home    Were the patients medications reviewed and updated  Yes    Current Symptoms  Vomitting    Vomitting severity  Mild      TCM Call     Post hospital issues  None    Should patient be enrolled in anticoag monitoring? No    Scheduled for follow up? Yes    Patients specialists  Endocrinologist; Nephrologist    Nephrologist name  Deb Gallego, DO     Other specialists names  Bette Walters MD     Did you obtain your prescribed medications  Yes    Do you need help managing your prescriptions or medications  No    Is transportation to your appointment needed  No    I have advised the patient to call PCP with any new or worsening symptoms  Natacha Bangura MA [facility] [address] [facility] [address] [facility]; Family    Are you recieving any outpatient services  No    Are you recieving home care services  No    Comments  SPOKE TO PT.  DOING BETTER. MEDS REVIEWED, CHART CURRENT. SENT TO FRONT TO SCHED TCM. SHUKRI        Here for tcm-  1.  Sob  was admitted from 7/11/- to 7/14 with sob -no medication changes- was on oxygen initially- has an inhaler   Possible tracheobronchitis- she has forgotten to take the inhaler since home many days-   had echo  Feb 2022- will repeat echo  2. Safety issues  Falls x 3  Before admit to hosptial  has had some concerns about sleep apnea-- is snoring at times-w ill have her see sleep team for testing- di oes have an upcoming appt with Dr Amari Red   living with sister- is not driving or taking a shower as sister is concerned about safety-   3. Depression and anxiety Adelaida Triana is tearful today- has  [facility] therapist and poscyhiatrist is out on leave    she feels she is having some memory issues- she is on klonipin for years-- is on 3 per day- had been on 4x day previously- will ask her to discuss with her psychiatry team  4 ckd stage 4- to see Dr. Chito Jurado in [facility] repeat labs to be done tomorrow  5. Flare of arhtiritis- severe knee pain- had injections w this week with ortho and slightly better today- will resume PT next week- discussed using ice packs   now is off statins        Review of Systems   Constitutional: Positive for fatigue. Respiratory: Positive for shortness of breath. Negative for choking. Clearing throat today   Cardiovascular: Positive for chest pain. Feels some pain in chest with breathing  deeply   Musculoskeletal: Positive for gait problem. All other systems reviewed and are negative. Objective     /80   Pulse 85   Ht 5' 7" (1.702 m)   Wt 100 kg (221 lb)   LMP  (LMP Unknown)   SpO2 94%   BMI 34.61 kg/m²      Physical Exam  Vitals and nursing note reviewed. Constitutional:       General: She is not in acute distress. Appearance: She is ill-appearing. Comments: Appears fatigued   HENT:      Head: Normocephalic. Right Ear: There is no impacted cerumen. Left Ear: There is no impacted cerumen. Nose: Congestion present. Mouth/Throat:      Pharynx: No oropharyngeal exudate.    Eyes:      General:         Right eye: No discharge. Left eye: No discharge. Cardiovascular:      Rate and Rhythm: Normal rate and regular rhythm. Heart sounds: Murmur heard. No gallop. Comments: Grade 2 /6 systolic murmur  Pulmonary:      Breath sounds: No wheezing or rhonchi. Abdominal:      Palpations: Abdomen is soft. Tenderness: There is no abdominal tenderness. Musculoskeletal:         General: No swelling or tenderness. Cervical back: No rigidity or tenderness. Skin:     Findings: No erythema.    Psychiatric:      Comments: Depressed affect       Medications have been reviewed by provider in current encounter    Verónica rFancisco DO

## 2023-07-28 ENCOUNTER — APPOINTMENT (OUTPATIENT)
Dept: LAB | Facility: HOSPITAL | Age: 63
End: 2023-07-28
Payer: MEDICARE

## 2023-07-28 DIAGNOSIS — E55.9 VITAMIN D DEFICIENCY: ICD-10-CM

## 2023-07-28 DIAGNOSIS — N18.4 CKD (CHRONIC KIDNEY DISEASE) STAGE 4, GFR 15-29 ML/MIN (HCC): ICD-10-CM

## 2023-07-28 DIAGNOSIS — N28.1 RENAL CYST: ICD-10-CM

## 2023-07-28 DIAGNOSIS — E21.3 HYPERPARATHYROIDISM (HCC): ICD-10-CM

## 2023-07-28 LAB
ALBUMIN SERPL BCP-MCNC: 3.8 G/DL (ref 3.5–5)
ALP SERPL-CCNC: 204 U/L (ref 46–116)
ALT SERPL W P-5'-P-CCNC: 25 U/L (ref 12–78)
ANION GAP SERPL CALCULATED.3IONS-SCNC: 6 MMOL/L
AST SERPL W P-5'-P-CCNC: 29 U/L (ref 5–45)
BACTERIA UR QL AUTO: NORMAL /HPF
BILIRUB SERPL-MCNC: 0.28 MG/DL (ref 0.2–1)
BILIRUB UR QL STRIP: NEGATIVE
BUN SERPL-MCNC: 31 MG/DL (ref 5–25)
CALCIUM SERPL-MCNC: 9.9 MG/DL (ref 8.3–10.1)
CHLORIDE SERPL-SCNC: 111 MMOL/L (ref 96–108)
CLARITY UR: CLEAR
CO2 SERPL-SCNC: 27 MMOL/L (ref 21–32)
COLOR UR: COLORLESS
CREAT SERPL-MCNC: 2.6 MG/DL (ref 0.6–1.3)
CREAT UR-MCNC: 40.7 MG/DL
ERYTHROCYTE [DISTWIDTH] IN BLOOD BY AUTOMATED COUNT: 13.1 % (ref 11.6–15.1)
GFR SERPL CREATININE-BSD FRML MDRD: 19 ML/MIN/1.73SQ M
GLUCOSE P FAST SERPL-MCNC: 115 MG/DL (ref 65–99)
GLUCOSE UR STRIP-MCNC: NEGATIVE MG/DL
HCT VFR BLD AUTO: 39 % (ref 34.8–46.1)
HGB BLD-MCNC: 11.9 G/DL (ref 11.5–15.4)
HGB UR QL STRIP.AUTO: NEGATIVE
KETONES UR STRIP-MCNC: NEGATIVE MG/DL
LEUKOCYTE ESTERASE UR QL STRIP: NEGATIVE
MAGNESIUM SERPL-MCNC: 2.4 MG/DL (ref 1.6–2.6)
MCH RBC QN AUTO: 31.1 PG (ref 26.8–34.3)
MCHC RBC AUTO-ENTMCNC: 30.5 G/DL (ref 31.4–37.4)
MCV RBC AUTO: 102 FL (ref 82–98)
NITRITE UR QL STRIP: NEGATIVE
NON-SQ EPI CELLS URNS QL MICRO: NORMAL /HPF
PH UR STRIP.AUTO: 6 [PH]
PHOSPHATE SERPL-MCNC: 3.7 MG/DL (ref 2.3–4.1)
PLATELET # BLD AUTO: 257 THOUSANDS/UL (ref 149–390)
PMV BLD AUTO: 11.1 FL (ref 8.9–12.7)
POTASSIUM SERPL-SCNC: 4.1 MMOL/L (ref 3.5–5.3)
PROT SERPL-MCNC: 7.5 G/DL (ref 6.4–8.4)
PROT UR STRIP-MCNC: NEGATIVE MG/DL
PROT UR-MCNC: 16 MG/DL
PROT/CREAT UR: 0.39 MG/G{CREAT} (ref 0–0.1)
PTH-INTACT SERPL-MCNC: 112.2 PG/ML (ref 12–88)
RBC # BLD AUTO: 3.83 MILLION/UL (ref 3.81–5.12)
RBC #/AREA URNS AUTO: NORMAL /HPF
SODIUM SERPL-SCNC: 144 MMOL/L (ref 135–147)
SP GR UR STRIP.AUTO: 1.01 (ref 1–1.03)
URATE SERPL-MCNC: 5 MG/DL (ref 2–7.5)
UROBILINOGEN UR STRIP-ACNC: <2 MG/DL
WBC # BLD AUTO: 8.48 THOUSAND/UL (ref 4.31–10.16)
WBC #/AREA URNS AUTO: NORMAL /HPF

## 2023-07-28 PROCEDURE — 83970 ASSAY OF PARATHORMONE: CPT

## 2023-07-28 PROCEDURE — 80053 COMPREHEN METABOLIC PANEL: CPT

## 2023-07-28 PROCEDURE — 81001 URINALYSIS AUTO W/SCOPE: CPT

## 2023-07-28 PROCEDURE — 84156 ASSAY OF PROTEIN URINE: CPT

## 2023-07-28 PROCEDURE — 36415 COLL VENOUS BLD VENIPUNCTURE: CPT

## 2023-07-28 PROCEDURE — 84100 ASSAY OF PHOSPHORUS: CPT

## 2023-07-28 PROCEDURE — 82570 ASSAY OF URINE CREATININE: CPT

## 2023-07-28 PROCEDURE — 85027 COMPLETE CBC AUTOMATED: CPT

## 2023-07-28 PROCEDURE — 83735 ASSAY OF MAGNESIUM: CPT

## 2023-07-28 PROCEDURE — 84550 ASSAY OF BLOOD/URIC ACID: CPT

## 2023-08-01 ENCOUNTER — APPOINTMENT (OUTPATIENT)
Dept: PHYSICAL THERAPY | Facility: CLINIC | Age: 63
End: 2023-08-01
Payer: MEDICARE

## 2023-08-02 ENCOUNTER — TELEPHONE (OUTPATIENT)
Age: 63
End: 2023-08-02

## 2023-08-02 ENCOUNTER — SOCIAL WORK (OUTPATIENT)
Dept: BEHAVIORAL/MENTAL HEALTH CLINIC | Facility: CLINIC | Age: 63
End: 2023-08-02
Payer: MEDICARE

## 2023-08-02 DIAGNOSIS — M17.12 PRIMARY OSTEOARTHRITIS OF LEFT KNEE: Primary | ICD-10-CM

## 2023-08-02 DIAGNOSIS — M17.11 PRIMARY OSTEOARTHRITIS OF RIGHT KNEE: ICD-10-CM

## 2023-08-02 DIAGNOSIS — F31.4 BIPOLAR 1 DISORDER, DEPRESSED, SEVERE (HCC): Primary | ICD-10-CM

## 2023-08-02 PROCEDURE — 90834 PSYTX W PT 45 MINUTES: CPT | Performed by: COUNSELOR

## 2023-08-02 NOTE — PSYCH
Behavioral Health Psychotherapy Progress Note    Psychotherapy Provided: Individual Psychotherapy     1. Bipolar 1 disorder, depressed, severe ([address])            Goals addressed in session: Goal 1     DATA: Ancelmo Rather said she is really struggling with living with her sister and her niece. She has been living with them for many years and has talked about this issues for a very long time in our sessions together. Ancelmo Rather feels they are against her and "they don't want me living there with them."  Her 2 adult sons don't talk to her and she feels "alone."  Years ago, this clinician recommended peer support but Ancelmo Rather refused. I bought this up today and she said "I'll think about it."  We talked about ways for her to try to cope living where she is and she wants to try some of them to see if they help. During this session, this clinician used the following therapeutic modalities: Solution-Focused Therapy    Substance Abuse was not addressed during this session. If the client is diagnosed with a co-occurring substance use disorder, please indicate any changes in the frequency or amount of use: N/A. Stage of change for addressing substance use diagnoses: No substance use/Not applicable    ASSESSMENT:  Andrei Murphy presents with a depressed and anxious mood. her affect is Normal range and intensity and Tearful, which is congruent, with her mood and the content of the session. The client has not made progress on their goals. Andrei Murphy presents with a none risk of suicide, none risk of self-harm, and none risk of harm to others. For any risk assessment that surpasses a "low" rating, a safety plan must be developed. A safety plan was indicated: no  If yes, describe in detail N/A    PLAN: Between sessions, Andrei Murphy will try some coping skills we talked about in our session today.  At the next session, the therapist will use Solution-Focused Therapy to address goals/needs/concerns. Behavioral Health Treatment Plan and Discharge Planning: Detla Ramirez is aware of and agrees to continue to work on their treatment plan. They have identified and are working toward their discharge goals.  yes    Visit start and stop times:    08/02/23  Start Time: 1000  Stop Time: 1045  Total Visit Time: 45 minutes

## 2023-08-02 NOTE — TELEPHONE ENCOUNTER
Caller: Patient     Doctor: Theodore Le     Reason for call: Patient asked for a script for PT for both knees     Call back#: [phone]

## 2023-08-03 ENCOUNTER — APPOINTMENT (OUTPATIENT)
Dept: PHYSICAL THERAPY | Facility: CLINIC | Age: 63
End: 2023-08-03
Payer: MEDICARE

## 2023-08-03 DIAGNOSIS — F31.81 BIPOLAR 2 DISORDER (HCC): Chronic | ICD-10-CM

## 2023-08-03 RX ORDER — CLONAZEPAM 0.5 MG/1
0.5 TABLET ORAL 3 TIMES DAILY
Qty: 90 TABLET | Refills: 1 | Status: SHIPPED | OUTPATIENT
Start: 2023-08-03 | End: 2023-08-11

## 2023-08-08 ENCOUNTER — APPOINTMENT (OUTPATIENT)
Dept: PHYSICAL THERAPY | Facility: CLINIC | Age: 63
End: 2023-08-08
Payer: MEDICARE

## 2023-08-10 ENCOUNTER — OFFICE VISIT (OUTPATIENT)
Age: 63
End: 2023-08-10
Payer: MEDICARE

## 2023-08-10 ENCOUNTER — APPOINTMENT (OUTPATIENT)
Dept: PHYSICAL THERAPY | Facility: CLINIC | Age: 63
End: 2023-08-10
Payer: MEDICARE

## 2023-08-10 VITALS
TEMPERATURE: 98.9 F | DIASTOLIC BLOOD PRESSURE: 80 MMHG | HEIGHT: 67 IN | SYSTOLIC BLOOD PRESSURE: 138 MMHG | WEIGHT: 224 LBS | OXYGEN SATURATION: 94 % | BODY MASS INDEX: 35.16 KG/M2 | HEART RATE: 83 BPM

## 2023-08-10 DIAGNOSIS — J45.909 UNCOMPLICATED ASTHMA, UNSPECIFIED ASTHMA SEVERITY, UNSPECIFIED WHETHER PERSISTENT: ICD-10-CM

## 2023-08-10 DIAGNOSIS — J45.40 MODERATE PERSISTENT ASTHMA WITHOUT COMPLICATION: Primary | ICD-10-CM

## 2023-08-10 PROCEDURE — 99213 OFFICE O/P EST LOW 20 MIN: CPT | Performed by: INTERNAL MEDICINE

## 2023-08-10 RX ORDER — BUDESONIDE AND FORMOTEROL FUMARATE DIHYDRATE 160; 4.5 UG/1; UG/1
2 AEROSOL RESPIRATORY (INHALATION) 2 TIMES DAILY
Qty: 10.2 G | Refills: 11 | Status: SHIPPED | OUTPATIENT
Start: 2023-08-10

## 2023-08-10 NOTE — PROGRESS NOTES
Pulmonary Follow Up Note   Ana Denton 66 y.o. female MRN: [id number]  8/11/2023      Assessment/Plan: Moderate persistent asthma without complication  She is overall stable with Symbicort twice daily. She can follow-up with me in 1 year or sooner if needed. Visit orders:    Diagnoses and all orders for this visit:    Moderate persistent asthma without complication    Uncomplicated asthma, unspecified asthma severity, unspecified whether persistent  -     budesonide-formoterol (Symbicort) 160-4.5 mcg/act inhaler; Inhale 2 puffs 2 (two) times a day Rinse mouth after use. No follow-ups on file. History of Present Illness   HPI:  Ana Denton is a 66 y.o. female who is here today for follow-up regarding asthma. She is overall stable from pulmonary perspective. She is maintained on Symbicort twice daily. She has occasional cough and sputum production. Denies significant wheezing. She has shortness of breath with exertion, overall stable. She is sleeping well, without any issues of awakening with choking or gasping. Review of Systems   Constitutional: Negative for chills, fever and unexpected weight change. HENT: Negative for postnasal drip and sore throat. Eyes: Negative for visual disturbance. Respiratory:        As noted in HPI   Cardiovascular: Negative for chest pain. Gastrointestinal: Negative for abdominal pain, diarrhea and vomiting. Musculoskeletal: Negative for arthralgias. Skin: Negative for rash. Neurological: Negative for headaches. Hematological: Negative for adenopathy. Psychiatric/Behavioral: Negative. All other systems reviewed and are negative.       Medical, Family and Social history reviewed and updated as appropriate    Historical Information   Past Medical History:   Diagnosis Date   • Anxiety    • Anxiety disorder    • Arthritis    • At risk for falls    • Bipolar 2 disorder ([address])    • Chronic back pain    • Chronic kidney disease    • Closed fracture of distal end of right fibula with routine healing 2020   • COVID-19     in 2021   • CVA (cerebral vascular accident) [facility])     noted on MRI in the past   • Depression    • GERD (gastroesophageal reflux disease)    • Hypercholesteremia    • Hypernatremia    • Hypertension    • Hypokalemia    • Idiopathic chronic pancreatitis ([address]) 2018   • Intervertebral disc disorder with radiculopathy of lumbosacral region     resolved: 2015   • Kidney disease    • Kidney disease    • Limb alert care status     LUE-fistula   • Panic attacks    • Pericardial effusion    • PONV (postoperative nausea and vomiting)    • Psychiatric problem    • Radiculitis     resolved: 2015   • Secondary renal hyperparathyroidism ([address])    • Stroke [facility])    • Vitamin D deficiency      Past Surgical History:   Procedure Laterality Date   • BUNIONECTOMY      Left foot    • CAST APPLICATION Right     Procedure: Application short-arm thumb spica splint;  Surgeon: Claudia Galvan MD;  Location: [facility];  Service: Orthopedics   • COLON SURGERY     • COLONOSCOPY  2021   • DILATION AND CURETTAGE OF UTERUS     • INDUCED       surgically induced   • CO ARTERIOVENOUS ANASTOMOSIS OPEN DIRECT Left 2019    Procedure: CREATION FISTULA ARTERIOVENOUS (AV) left wrists possible left upper;  Surgeon: May Long MD;  Location: [facility];  Service: Vascular   • CO CORRJ HALLUX VALGUS W/SESMDC W/DIST Joseph Flatness Left 2019    Procedure: Surinder Jewish;  Surgeon: Hi Mane DPM;  [facility];  Service: Podiatry   • CO CORRJ HALLUX VALGUS W/SESMDC W/DIST [place] Right 8/3/2020    Procedure: Shannon Fernandez;  Surgeon: Hi Mane DPM;  Location: [facility];  Service: Podiatry   • CO CORRJ HALLUX VALGUS W/SESMDC W/PROX PHLNX OSTEOT Right 2021    Procedure: Iris Birkenhead, right tameka osteotomy and 2nd claw toe correction;  Surgeon: Adilene Thomas MD; Location: [facility];  Service: Orthopedics   • WA ERCP DX COLLECTION SPECIMEN BRUSHING/WASHING N/A 4/11/2018    Procedure: ENDOSCOPIC RETROGRADE CHOLANGIOPANCREATOGRAPHY (ERCP); Surgeon: Beena Liu MD;  Location: [facility];  Service: Gastroenterology   • WA LAPAROSCOPY PROCTOPEXY PROLAPSE N/A 7/13/2018    Procedure: ROBOTIC SIGMOID RESECTION / RECTOPEXY;  Surgeon: Selam Prieto MD;  [facility];  Service: Colorectal   • WA OPEN TREATMENT RADIAL SHAFT FRACTURE Right 10/11/2021    Procedure: OPEN REDUCTION W/ INTERNAL FIXATION (ORIF) RADIUS (WRIST), RIGHT DISTAL;  Surgeon: Lynette Worthy MD;  Location: [facility];  Service: Orthopedics   • WA REMOVAL IMPLANT DEEP Right 6/23/2022    Procedure: Removal of hardware volar aspect right distal radius (distal radial plate and screws);   Surgeon: Venice Mora MD;  Location: [facility];  Service: Orthopedics   • WA SIGMOIDOSCOPY FLX DX W/COLLJ SPEC BR/WA IF PFRMD N/A 7/13/2018    Procedure: Jono Guerra;  Surgeon: Selam Prieto MD;  [facility];  Service: Colorectal   • WA TR TDN RESTORE INTRNSC FUNCJ ALL 4 FNGRS Right 6/23/2022    Procedure: Right ring finger flexor digitorum superficialis to flexor pollicis longus tendon transfer;  Surgeon: Venice Mora MD;  Location: [facility];  Service: Orthopedics   • TUBAL LIGATION Bilateral 1997   • [address] THYROID BIOPSY  7/30/2019     Family History   Problem Relation Age of Onset   • Bipolar disorder Mother    • Mental illness Mother         depression   • Stroke Mother    • Dementia Mother    • Colon polyps Mother    • Heart disease Father    • Hypertension Father    • Diabetes Father    • Other Family         Back disorder   • Diabetes Family    • Heart disease Family    • Hypertension Family    • Stroke Family    • Thyroid disease Family    • Breast cancer Paternal Grandmother         age unknown   • Breast cancer Paternal Aunt         age unknown   • Breast cancer Maternal Aunt         age unknown   • Mental illness Sister    • Colon polyps Sister    • Mental illness Sister    • Heart disease Sister    • No Known Problems Sister    • Breast cancer Sister 76   • Other Son         pituitary tumor   • Hypertension Son    • Obesity Son    • No Known Problems Son    • No Known Problems Maternal Grandmother    • No Known Problems Maternal Grandfather    • No Known Problems Paternal Grandfather    • Breast cancer Paternal Aunt         age unknown   • Substance Abuse Neg Hx         neg fam hx   • Colon cancer Neg Hx        Social History     Tobacco Use   Smoking Status Never   Smokeless Tobacco Never         Meds/Allergies     Current Outpatient Medications:   •  acetaminophen (TYLENOL) 650 mg CR tablet, Take 1 tablet (650 mg total) by mouth every 8 (eight) hours as needed for mild pain (Patient taking differently: Take 500 mg by mouth every 8 (eight) hours as needed for mild pain), Disp: 30 tablet, Rfl: 0  •  albuterol (Ventolin HFA) 90 mcg/act inhaler, Inhale 2 puffs every 6 (six) hours as needed for wheezing or shortness of breath, Disp: 8.5 g, Rfl: 3  •  AMILoride 5 mg tablet, Take 1 tablet (5 mg total) by mouth 2 (two) times a day, Disp: 180 tablet, Rfl: 3  •  amLODIPine (NORVASC) 5 mg tablet, Take 1 tablet (5 mg total) by mouth daily, Disp: 90 tablet, Rfl: 3  •  aspirin 81 mg chewable tablet, Chew 1 tablet (81 mg total) daily, Disp: , Rfl:   •  budesonide-formoterol (Symbicort) 160-4.5 mcg/act inhaler, Inhale 2 puffs 2 (two) times a day Rinse mouth after use., Disp: 10.2 g, Rfl: 11  •  carvedilol (COREG) 25 mg tablet, TAKE 1 TABLET BY MOUTH  TWICE DAILY WITH MEALS, Disp: 180 tablet, Rfl: 3  •  cholecalciferol (VITAMIN D3) 1,000 units tablet, Take 5 tablets (5,000 Units total) by mouth daily, Disp: 90 tablet, Rfl: 5  •  clonazePAM (KlonoPIN) 0.5 mg tablet, Take 1 tablet (0.5 mg total) by mouth 3 (three) times a day, Disp: 90 tablet, Rfl: 1  •  fluvoxaMINE (LUVOX) 100 mg tablet, Fluvoxamine 100m tablet in the morning ; 2 tablets at night, Disp: 270 tablet, Rfl: 1  •  lamoTRIgine (LaMICtal) 200 MG tablet, TAKE 1 TABLET BY MOUTH IN THE  MORNING AND 1 TABLET AT NIGHT, Disp: 180 tablet, Rfl: 3  •  omeprazole (PriLOSEC) 40 MG capsule, Take 1 capsule (40 mg total) by mouth daily before breakfast, Disp: 90 capsule, Rfl: 3  •  ondansetron (ZOFRAN) 4 mg tablet, Take 1 tablet (4 mg total) by mouth every 8 (eight) hours as needed for nausea or vomiting, Disp: 60 tablet, Rfl: 1  •  QUEtiapine (SEROquel) 100 mg tablet, Quetiapine 100mg : 1 tablet + 400mg po at night, Disp: 90 tablet, Rfl: 0  •  QUEtiapine (SEROquel) 300 mg tablet, Quetiapine 300m tablet po  in morning, Disp: 90 tablet, Rfl: 0  •  QUEtiapine (SEROquel) 400 MG tablet, Quetiapine 400m tablet + 100mg tablet po at night, Disp: 90 tablet, Rfl: 0  •  rosuvastatin (CRESTOR) 20 MG tablet, TAKE 1 TABLET BY MOUTH IN  THE EVENING, Disp: 90 tablet, Rfl: 3  •  traMADol (Ultram) 50 mg tablet, Take 1 tablet (50 mg total) by mouth every 12 (twelve) hours as needed for moderate pain, Disp: 60 tablet, Rfl: 0  Allergies   Allergen Reactions   • Molds & Smuts Nasal Congestion   • Bee Pollen Nasal Congestion     Other reaction(s): Nasal Congestion   • Pollen Extract Nasal Congestion       Vitals: Blood pressure 138/80, pulse 83, temperature 98.9 °F (37.2 °C), temperature source Tympanic, height 5' 7" (1.702 m), weight 102 kg (224 lb), SpO2 94 %, not currently breastfeeding. Body mass index is 35.08 kg/m². Oxygen Therapy  SpO2: 94 %  Oxygen Therapy: None (Room air)    Physical Exam   Physical Exam  Constitutional:       General: She is not in acute distress. HENT:      Head: Normocephalic. Eyes:      General: No scleral icterus. Neck:      Vascular: No JVD. Cardiovascular:      Rate and Rhythm: Normal rate and regular rhythm. Pulmonary:      Breath sounds: No wheezing, rhonchi or rales. Abdominal:      Palpations: Abdomen is soft. Tenderness: There is no abdominal tenderness. Musculoskeletal:      Cervical back: Neck supple. Lymphadenopathy:      Cervical: No cervical adenopathy. Skin:     General: Skin is warm and dry. Neurological:      Mental Status: She is alert and oriented to person, place, and time. Psychiatric:         Mood and Affect: Mood normal.       Labs: I have personally reviewed pertinent lab results. Lab Results   Component Value Date    WBC 8.48 07/28/2023    HGB 11.9 07/28/2023    HCT 39.0 07/28/2023     (H) 07/28/2023     07/28/2023     Lab Results   Component Value Date    CALCIUM 9.9 07/28/2023     02/27/2017    K 4.1 07/28/2023    CO2 27 07/28/2023     (H) 07/28/2023    BUN 31 (H) 07/28/2023    CREATININE 2.60 (H) 07/28/2023     Lab Results   Component Value Date    IGE 23.5 09/06/2019     Lab Results   Component Value Date    ALT 25 07/28/2023    AST 29 07/28/2023    ALKPHOS 204 (H) 07/28/2023       Pulmonary function testing:  Performed 8/12/19 and personally interpreted  FEV1/FVC ratio 85%   FEV1 115% predicted  % predicted. No response to bronchodilators   % predicted   % predicted  DLCO corrected for hemoglobin 74 % predicted.

## 2023-08-11 ENCOUNTER — OFFICE VISIT (OUTPATIENT)
Dept: FAMILY MEDICINE CLINIC | Facility: HOSPITAL | Age: 63
End: 2023-08-11
Payer: MEDICARE

## 2023-08-11 ENCOUNTER — HOSPITAL ENCOUNTER (OUTPATIENT)
Dept: RADIOLOGY | Facility: HOSPITAL | Age: 63
End: 2023-08-11
Payer: MEDICARE

## 2023-08-11 VITALS
HEIGHT: 67 IN | BODY MASS INDEX: 35.31 KG/M2 | OXYGEN SATURATION: 95 % | WEIGHT: 225 LBS | SYSTOLIC BLOOD PRESSURE: 138 MMHG | DIASTOLIC BLOOD PRESSURE: 78 MMHG | HEART RATE: 93 BPM

## 2023-08-11 DIAGNOSIS — S69.91XA INJURY OF RIGHT RING FINGER, INITIAL ENCOUNTER: ICD-10-CM

## 2023-08-11 DIAGNOSIS — F31.81 BIPOLAR 2 DISORDER (HCC): Chronic | ICD-10-CM

## 2023-08-11 DIAGNOSIS — H61.23 BILATERAL HEARING LOSS DUE TO CERUMEN IMPACTION: Primary | ICD-10-CM

## 2023-08-11 PROCEDURE — 69210 REMOVE IMPACTED EAR WAX UNI: CPT | Performed by: INTERNAL MEDICINE

## 2023-08-11 PROCEDURE — 73140 X-RAY EXAM OF FINGER(S): CPT

## 2023-08-11 PROCEDURE — 99214 OFFICE O/P EST MOD 30 MIN: CPT | Performed by: INTERNAL MEDICINE

## 2023-08-11 RX ORDER — CLONAZEPAM 0.5 MG/1
0.5 TABLET ORAL 3 TIMES DAILY
Qty: 90 TABLET | Refills: 0 | Status: SHIPPED | OUTPATIENT
Start: 2023-08-11 | End: 2023-08-14 | Stop reason: SDUPTHER

## 2023-08-11 NOTE — PROGRESS NOTES
Assessment/Plan:     Diagnosis ICD-10-CM Associated Orders   1. Bilateral hearing loss due to cerumen impaction  H61.23 Ear cerumen removal      2. Injury of right ring finger, initial encounter  S69.91XA XR finger right fourth digit-ring          Problem List Items Addressed This Visit    None  Visit Diagnoses     Bilateral hearing loss due to cerumen impaction    -  Primary    Relevant Orders    Ear cerumen removal (Completed)    Injury of right ring finger, initial encounter        Relevant Orders    XR finger right fourth digit-ring            Return in about 2 weeks (around 8/25/2023) for cerumen removal.      Subjective:    Patient ID: Custer Spatz is a 66 y.o. female    bilateral hearing loss- irrigated - able to clear right - left still with some  Cerumen in  [place] after multiple irrigations'   right  Index  4th didgit swelling - ? Injured  Opening door for her dog   dog Mariela was  Put down  This week      The following portions of the patient's history were reviewed and updated as appropriate: allergies, current medications and problem list.     Review of Systems   Constitutional: Negative for fever. HENT: Positive for hearing loss.           Objective:      Current Outpatient Medications:   •  acetaminophen (TYLENOL) 650 mg CR tablet, Take 1 tablet (650 mg total) by mouth every 8 (eight) hours as needed for mild pain (Patient taking differently: Take 500 mg by mouth every 8 (eight) hours as needed for mild pain), Disp: 30 tablet, Rfl: 0  •  albuterol (Ventolin HFA) 90 mcg/act inhaler, Inhale 2 puffs every 6 (six) hours as needed for wheezing or shortness of breath, Disp: 8.5 g, Rfl: 3  •  AMILoride 5 mg tablet, Take 1 tablet (5 mg total) by mouth 2 (two) times a day, Disp: 180 tablet, Rfl: 3  •  amLODIPine (NORVASC) 5 mg tablet, Take 1 tablet (5 mg total) by mouth daily, Disp: 90 tablet, Rfl: 3  •  aspirin 81 mg chewable tablet, Chew 1 tablet (81 mg total) daily, Disp: , Rfl:   • budesonide-formoterol (Symbicort) 160-4.5 mcg/act inhaler, Inhale 2 puffs 2 (two) times a day Rinse mouth after use., Disp: 10.2 g, Rfl: 11  •  carvedilol (COREG) 25 mg tablet, TAKE 1 TABLET BY MOUTH  TWICE DAILY WITH MEALS, Disp: 180 tablet, Rfl: 3  •  cholecalciferol (VITAMIN D3) 1,000 units tablet, Take 5 tablets (5,000 Units total) by mouth daily, Disp: 90 tablet, Rfl: 5  •  clonazePAM (KlonoPIN) 0.5 mg tablet, Take 1 tablet (0.5 mg total) by mouth 3 (three) times a day, Disp: 90 tablet, Rfl: 1  •  fluvoxaMINE (LUVOX) 100 mg tablet, Fluvoxamine 100m tablet in the morning ; 2 tablets at night, Disp: 270 tablet, Rfl: 1  •  lamoTRIgine (LaMICtal) 200 MG tablet, TAKE 1 TABLET BY MOUTH IN THE  MORNING AND 1 TABLET AT NIGHT, Disp: 180 tablet, Rfl: 3  •  omeprazole (PriLOSEC) 40 MG capsule, Take 1 capsule (40 mg total) by mouth daily before breakfast, Disp: 90 capsule, Rfl: 3  •  ondansetron (ZOFRAN) 4 mg tablet, Take 1 tablet (4 mg total) by mouth every 8 (eight) hours as needed for nausea or vomiting, Disp: 60 tablet, Rfl: 1  •  QUEtiapine (SEROquel) 100 mg tablet, Quetiapine 100mg : 1 tablet + 400mg po at night, Disp: 90 tablet, Rfl: 0  •  QUEtiapine (SEROquel) 300 mg tablet, Quetiapine 300m tablet po  in morning, Disp: 90 tablet, Rfl: 0  •  QUEtiapine (SEROquel) 400 MG tablet, Quetiapine 400m tablet + 100mg tablet po at night, Disp: 90 tablet, Rfl: 0  •  rosuvastatin (CRESTOR) 20 MG tablet, TAKE 1 TABLET BY MOUTH IN  THE EVENING, Disp: 90 tablet, Rfl: 3  •  traMADol (Ultram) 50 mg tablet, Take 1 tablet (50 mg total) by mouth every 12 (twelve) hours as needed for moderate pain, Disp: 60 tablet, Rfl: 0    Blood pressure 138/78, pulse 93, height 5' 7" (1.702 m), weight 102 kg (225 lb), SpO2 95 %, not currently breastfeeding. Physical Exam  Vitals and nursing note reviewed. Constitutional:       General: She is not in acute distress. Appearance: She is not toxic-appearing.    HENT:      Right Ear: There is impacted cerumen. Left Ear: There is impacted cerumen. Musculoskeletal:      Comments: Swelling on right 4th digit      Ear cerumen removal    Date/Time: 8/11/2023 11:40 AM    Performed by: Marcio Wu DO  Authorized by: Marcio Wu DO  Universal Protocol:  Consent: Verbal consent obtained. Patient location:  Clinic  Procedure details:     Location:  L ear and R ear    Procedure type: irrigation with instrumentation      Instrumentation: forceps      Approach:  External  Post-procedure details:     Complication:  None    Post-procedure hearing quality: improved on right left still an issues. Patient tolerance of procedure:   Tolerated with difficulty

## 2023-08-11 NOTE — ASSESSMENT & PLAN NOTE
She is overall stable with Symbicort twice daily. She can follow-up with me in 1 year or sooner if needed.

## 2023-08-14 DIAGNOSIS — F31.81 BIPOLAR 2 DISORDER (HCC): Chronic | ICD-10-CM

## 2023-08-14 RX ORDER — CLONAZEPAM 0.5 MG/1
0.5 TABLET ORAL 3 TIMES DAILY
Qty: 90 TABLET | Refills: 0 | Status: SHIPPED | OUTPATIENT
Start: 2023-08-14 | End: 2023-08-17

## 2023-08-15 ENCOUNTER — TELEPHONE (OUTPATIENT)
Dept: FAMILY MEDICINE CLINIC | Facility: HOSPITAL | Age: 63
End: 2023-08-15

## 2023-08-15 DIAGNOSIS — F31.81 BIPOLAR 2 DISORDER (HCC): Chronic | ICD-10-CM

## 2023-08-15 RX ORDER — CLONAZEPAM 0.5 MG/1
0.5 TABLET ORAL 3 TIMES DAILY
Qty: 270 TABLET | Refills: 0 | Status: CANCELLED | OUTPATIENT
Start: 2023-08-15

## 2023-08-15 NOTE — PATIENT INSTRUCTIONS
Governor Nathanael [id number]   Thanks for presenting to today's Appointment at [address] [facility]  Venlafaxine was started  Aripiprazole was discontinued  Your medications otherwise were not changed

## 2023-08-15 NOTE — PSYCH
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 delusions in the mist of sadness, while recently discontining Aripiprazole - s/e "spacy". Case complicated as there noticeable cognitive deficits with responses to question were opposite of questions asked, h/o recent head trauma.            DSM5  Major Depressive Disorder, recurrent w/ incongruent psychotic features  Generalized anxiety disorder  Obsessive-compulsive disorder, compulsive       PLAN:    · Discussed diagnostic impression linked to history, external records,  clinical findings regarding depression  · Labs reviewed, recent abnormal CBC  · Started on Venlafaxine 37.5mg x 2 wks, then 75mg  · Discontinued Abilify 10 mg        Medications Prescribed During Encounter at [facility]:  •  venlafaxine 37.5 mg 24 hr tablet, Venlafaxine ER 37.5mg : 1 tablet po daily in the morning x 2 wks, then  2 tablets po daily in the morning, Disp: 30 tablet, Rfl: 2                                •  fluvoxaMINE (LUVOX) 100 mg tablet, Fluvoxamine 100m tablet in the morning ; 2 tablets at night, Disp: 270 tablet, Rfl: 1    Medication List Otherwise:  •  lamoTRIgine (LaMICtal) 200 MG tablet,  1 tablet in the morning, 1 tablet at night, Disp: 180 tablet, Rfl: 3  •  QUEtiapine (SEROquel) 100 mg tablet, Quetiapine 100mg : 1 tablet + 400mg po at night, Disp: 90 tablet, Rfl: 0  •  QUEtiapine (SEROquel) 300 mg tablet, Quetiapine 300m tablet po  in morning, Disp: 90 tablet, Rfl: 0  •  QUEtiapine (SEROquel) 400 MG tablet, Quetiapine 400m tablet + 100mg tablet po at night, Disp: 90 tablet, Rfl: 0                                Treatement: Risks, benefits, and possible side effects of medications explained to patient and patient verbalizes understanding. Next Appointment at [facility]: 6wks    Today's Appointment@ [facility]  Face To Face:  9:02 AM- 9:46AM ;Documentation Time:  9:55AM- 10:07AM    Encounter Duration: Time Spent 44 minutes with Patient. Greater than 50% of total time was spent with the patient           MDM  Number of Diagnoses or Management Options  Generalized anxiety disorder: established, worsening  Major depressive disorder, recurrent, severe with psychotic features ([address]): new, no workup  Obsessive-compulsive disorder, unspecified type: established, improving     Amount and/or Complexity of Data Reviewed  Clinical lab tests: reviewed  Review and summarize past medical records: yes    Risk of Complications, Morbidity, and/or Mortality  Presenting problems: moderate  Diagnostic procedures: moderate  Management options: moderate

## 2023-08-16 ENCOUNTER — TELEPHONE (OUTPATIENT)
Dept: BEHAVIORAL/MENTAL HEALTH CLINIC | Facility: CLINIC | Age: 63
End: 2023-08-16

## 2023-08-16 NOTE — TELEPHONE ENCOUNTER
Pt called to infirm that the script that was sent to [facility] was suppose to have a 90 day supply but on the script it's only dispensing a 30 day supply.  Please assist

## 2023-08-16 NOTE — TELEPHONE ENCOUNTER
Client called this clinician and we spoke directly.   She said "I had to put my dog down and I miss her so much."  She said she appreciated talked for a few minutes and said "I'll see you at our next appointment."

## 2023-08-17 ENCOUNTER — OFFICE VISIT (OUTPATIENT)
Dept: PSYCHIATRY | Facility: CLINIC | Age: 63
End: 2023-08-17
Payer: MEDICARE

## 2023-08-17 DIAGNOSIS — F31.81 BIPOLAR 2 DISORDER (HCC): Chronic | ICD-10-CM

## 2023-08-17 DIAGNOSIS — F42.9 OBSESSIVE-COMPULSIVE DISORDER, UNSPECIFIED TYPE: ICD-10-CM

## 2023-08-17 DIAGNOSIS — F41.1 GENERALIZED ANXIETY DISORDER: ICD-10-CM

## 2023-08-17 DIAGNOSIS — F33.3 MAJOR DEPRESSIVE DISORDER, RECURRENT, SEVERE WITH PSYCHOTIC FEATURES (HCC): Primary | ICD-10-CM

## 2023-08-17 PROCEDURE — 99214 OFFICE O/P EST MOD 30 MIN: CPT | Performed by: PSYCHIATRY & NEUROLOGY

## 2023-08-17 RX ORDER — CLONAZEPAM 0.5 MG/1
0.5 TABLET ORAL 3 TIMES DAILY
Qty: 270 TABLET | Refills: 0 | Status: SHIPPED | OUTPATIENT
Start: 2023-08-17 | End: 2023-11-15

## 2023-08-17 RX ORDER — VENLAFAXINE HYDROCHLORIDE 37.5 MG/1
TABLET, EXTENDED RELEASE ORAL
Qty: 30 TABLET | Refills: 2 | Status: SHIPPED | OUTPATIENT
Start: 2023-08-17 | End: 2023-08-21

## 2023-08-17 RX ORDER — FLUVOXAMINE MALEATE 100 MG
TABLET ORAL
Qty: 270 TABLET | Refills: 1 | Status: SHIPPED | OUTPATIENT
Start: 2023-08-17

## 2023-08-18 ENCOUNTER — EVALUATION (OUTPATIENT)
Dept: PHYSICAL THERAPY | Facility: CLINIC | Age: 63
End: 2023-08-18
Payer: MEDICARE

## 2023-08-18 DIAGNOSIS — M17.11 PRIMARY OSTEOARTHRITIS OF RIGHT KNEE: ICD-10-CM

## 2023-08-18 DIAGNOSIS — M17.12 PRIMARY OSTEOARTHRITIS OF LEFT KNEE: ICD-10-CM

## 2023-08-18 PROCEDURE — 97110 THERAPEUTIC EXERCISES: CPT | Performed by: PHYSICAL THERAPIST

## 2023-08-18 PROCEDURE — 97161 PT EVAL LOW COMPLEX 20 MIN: CPT | Performed by: PHYSICAL THERAPIST

## 2023-08-18 PROCEDURE — 97112 NEUROMUSCULAR REEDUCATION: CPT | Performed by: PHYSICAL THERAPIST

## 2023-08-18 NOTE — PROGRESS NOTES
PT Evaluation     Today's date: 2023  Patient name: Daniela Egan  : 1960  MRN: [id number]  Referring provider: Geri Hamm PA-C  Dx:   Encounter Diagnosis     ICD-10-CM    1. Primary osteoarthritis of left knee  M17.12 Ambulatory Referral to Physical Therapy      2. Primary osteoarthritis of right knee  M17.11 Ambulatory Referral to Physical Therapy                     Assessment  Assessment details: Daniela Egan is a 63 y.o. female presenting to outpatient physical therapy at [facility] with complaints of L knee pain, stiffness and weakness.  She presents with decreased L knee range of motion, decreased strength, limited flexibility, poor postural awareness, poor body mechanics, altered gait pattern, poor balance, decreased tolerance to activity and decreased functional mobility due to Primary osteoarthritis of left knee  Lumbar radiculopathy.  She would benefit from skilled PT services in order to address these deficits and reach maximum level of function.  Thank you for the referral!    Impairments: abnormal or restricted ROM, activity intolerance, impaired balance, impaired physical strength, lacks appropriate home exercise program and pain with function    Symptom irritability: moderateUnderstanding of Dx/Px/POC: good   Prognosis: good    Goals  STG  1. Independent with HEP in 4 weeks  2. Decrease pain at worst by 50% in 4 weeks    LTG  1. Increase LLE strength in all planes to 5/5 in 8 weeks  2.  Return to full, unrestricted stair & step negotiation in 8 weeks    Plan  Patient would benefit from: skilled physical therapy  Planned modality interventions: thermotherapy: hydrocollator packs  Planned therapy interventions: manual therapy, neuromuscular re-education, therapeutic activities, therapeutic exercise and home exercise program  Frequency: 2x week  Duration in weeks: 8  Treatment plan discussed with: patient        Subjective Evaluation    History of Present Illness  Date of onset: 2023  Mechanism of injury: Pt reports chronic history of L knee pain and weakness, worse with steps. Pt states that prednisone helps but that she is on her last dose today. Quality of life: good    Patient Goals  Patient goals for therapy: decreased pain and increased strength  Patient goal: painfree stair & step negotiation   Pain  Current pain ratin  At best pain ratin  At worst pain rating: 10  Location: posteriormedial L knee  Quality: sharp      Diagnostic Tests  X-ray: abnormal (Moderate osteoarthritis with narrowing of the medial tibiofemoral joint and small osteophytes seen.)  Treatments  Previous treatment: injection treatment and medication        Objective     Active Range of Motion   Left Knee   Flexion: 115 degrees with pain  Extension: 5 degrees with pain    Right Knee   Normal active range of motion    Strength/Myotome Testing     Left Knee   Flexion: 4-  Extension: 4-  Quadriceps contraction: fair    Right Knee   Normal strength    Tests     Left Knee   Negative anterior drawer, lateral Kateryna, medial Kateryna, posterior drawer, posterior Lachman, Thessaly's test at 5 degrees and Thessaly's test at 20 degrees.         EPOC: 10/18/23      Manuals             L Knee PROM             Gentle L tibiofemoral mobs                                       Neuro Re-Ed             L quad set             HS curl w/swiss ball x25            SLR iso x25 3" ea            SAQ iso             LAQ iso x25 2lb 5" ea            Reverse squat slant x25 5"                         Ther Ex             HR x25            TR x25            Slant board 10x10"            Clam shell supine x25 Peach TB                                                    L Knee TERT with heat Into EXT             Ther Activity             LE Bike for improved L knee ROM 6' L2            Pt education: pathoanatomy, nature of sxs, POC, HEP 2' NS            Gait Training                                       Modalities

## 2023-08-21 ENCOUNTER — TELEPHONE (OUTPATIENT)
Dept: PSYCHIATRY | Facility: CLINIC | Age: 63
End: 2023-08-21

## 2023-08-21 DIAGNOSIS — F41.1 GENERALIZED ANXIETY DISORDER: Primary | ICD-10-CM

## 2023-08-21 RX ORDER — VENLAFAXINE HYDROCHLORIDE 37.5 MG/1
CAPSULE, EXTENDED RELEASE ORAL
Qty: 60 CAPSULE | Refills: 1 | Status: SHIPPED | OUTPATIENT
Start: 2023-08-21

## 2023-08-21 NOTE — TELEPHONE ENCOUNTER
[place] 1960 , [facility] chart was reviewed.   Venlafaxine 37.5mg capsules were sent to [address] [place] [address]  tablet formulation    No This note was not shared with the patient due to reasonable likelihood of causing patient harm

## 2023-08-21 NOTE — TELEPHONE ENCOUNTER
Pt called, said that her insurance will only cover the capsules, asked if you could resend the prescription for the venlafaxine so that it's capsules instead of tablets

## 2023-08-23 ENCOUNTER — OFFICE VISIT (OUTPATIENT)
Dept: PHYSICAL THERAPY | Facility: CLINIC | Age: 63
End: 2023-08-23
Payer: MEDICARE

## 2023-08-23 ENCOUNTER — RA CDI HCC (OUTPATIENT)
Dept: OTHER | Facility: HOSPITAL | Age: 63
End: 2023-08-23

## 2023-08-23 DIAGNOSIS — M17.12 PRIMARY OSTEOARTHRITIS OF LEFT KNEE: Primary | ICD-10-CM

## 2023-08-23 DIAGNOSIS — M17.11 PRIMARY OSTEOARTHRITIS OF RIGHT KNEE: ICD-10-CM

## 2023-08-23 PROCEDURE — 97110 THERAPEUTIC EXERCISES: CPT | Performed by: PHYSICAL THERAPIST

## 2023-08-23 PROCEDURE — 97112 NEUROMUSCULAR REEDUCATION: CPT | Performed by: PHYSICAL THERAPIST

## 2023-08-23 NOTE — PROGRESS NOTES
E22.1   [address] [facility] coding opportunities          Chart Reviewed number of suggestions sent to Provider: 1     Patients Insurance     Medicare Insurance: [employer]

## 2023-08-23 NOTE — PROGRESS NOTES
Daily Note     Today's date: 2023  Patient name: Belen Encarnacion  : 1960  MRN: [id number]  Referring provider: Kami Tolbert PA-C  Dx:   Encounter Diagnosis     ICD-10-CM    1. Primary osteoarthritis of left knee  M17.12       2. Primary osteoarthritis of right knee  M17.11           Subjective: Compliant with HEP, no questions regarding POC, motivated to continue PT    Objective: See treatment diary below      Assessment: Tolerated treatment well with initiation of full treatment program as noted below requiring verbal and tactile cues from PT for safe execution of therapeutic exercise. Patient demonstrated fatigue post treatment, exhibited good technique with therapeutic exercises and would benefit from continued PT      Plan: Progress treatment as tolerated.        EPOC: 10/18/23    Manuals             L Knee PROM             Gentle L tibiofemoral mobs                                       Neuro Re-Ed             L quad set             HS curl w/swiss ball x25            SLR iso x25 3" ea            SAQ iso             LAQ iso x25 2lb 5" ea            Reverse squat slant x25 5"                         Ther Ex             HR x25            TR x25            Slant board 10x10"            Clam shell supine x25 Peach TB                                                    L Knee TERT with heat Into EXT             Ther Activity             LE Bike for improved L knee ROM 6' L2            Pt education: pathoanatomy, nature of sxs, POC, HEP 2' NS            Gait Training                                       Modalities

## 2023-08-27 ENCOUNTER — HOSPITAL ENCOUNTER (EMERGENCY)
Facility: HOSPITAL | Age: 63
Discharge: HOME | End: 2023-08-27
Attending: EMERGENCY MEDICINE
Payer: MEDICARE

## 2023-08-27 ENCOUNTER — APPOINTMENT (EMERGENCY)
Dept: CT IMAGING | Facility: HOSPITAL | Age: 63
End: 2023-08-27
Payer: MEDICARE

## 2023-08-27 VITALS
HEART RATE: 84 BPM | RESPIRATION RATE: 18 BRPM | DIASTOLIC BLOOD PRESSURE: 63 MMHG | SYSTOLIC BLOOD PRESSURE: 146 MMHG | OXYGEN SATURATION: 98 % | TEMPERATURE: 97.8 F

## 2023-08-27 DIAGNOSIS — M43.17 ANTEROLISTHESIS OF LUMBOSACRAL SPINE: ICD-10-CM

## 2023-08-27 DIAGNOSIS — R10.9 FLANK PAIN: Primary | ICD-10-CM

## 2023-08-27 LAB
ALBUMIN SERPL BCP-MCNC: 4.5 G/DL (ref 3.5–5)
ALP SERPL-CCNC: 224 U/L (ref 34–104)
ALT SERPL W P-5'-P-CCNC: 14 U/L (ref 7–52)
ANION GAP SERPL CALCULATED.3IONS-SCNC: 9 MMOL/L
AST SERPL W P-5'-P-CCNC: 17 U/L (ref 13–39)
BACTERIA UR QL AUTO: NORMAL /HPF
BASOPHILS # BLD AUTO: 0.08 THOUSANDS/ÂΜL (ref 0–0.1)
BASOPHILS NFR BLD AUTO: 1 % (ref 0–1)
BILIRUB SERPL-MCNC: 0.27 MG/DL (ref 0.2–1)
BILIRUB UR QL STRIP: NEGATIVE
BUN SERPL-MCNC: 44 MG/DL (ref 5–25)
CALCIUM SERPL-MCNC: 9.7 MG/DL (ref 8.4–10.2)
CHLORIDE SERPL-SCNC: 106 MMOL/L (ref 96–108)
CLARITY UR: CLEAR
CO2 SERPL-SCNC: 21 MMOL/L (ref 21–32)
COLOR UR: ABNORMAL
CREAT SERPL-MCNC: 2.51 MG/DL (ref 0.6–1.3)
EOSINOPHIL # BLD AUTO: 0.2 THOUSAND/ÂΜL (ref 0–0.61)
EOSINOPHIL NFR BLD AUTO: 3 % (ref 0–6)
ERYTHROCYTE [DISTWIDTH] IN BLOOD BY AUTOMATED COUNT: 13 % (ref 11.6–15.1)
GFR SERPL CREATININE-BSD FRML MDRD: 20 ML/MIN/1.73SQ M
GLUCOSE SERPL-MCNC: 95 MG/DL (ref 65–140)
GLUCOSE UR STRIP-MCNC: NEGATIVE MG/DL
HCT VFR BLD AUTO: 37.4 % (ref 34.8–46.1)
HGB BLD-MCNC: 11.5 G/DL (ref 11.5–15.4)
HGB UR QL STRIP.AUTO: NEGATIVE
IMM GRANULOCYTES # BLD AUTO: 0.03 THOUSAND/UL (ref 0–0.2)
IMM GRANULOCYTES NFR BLD AUTO: 0 % (ref 0–2)
KETONES UR STRIP-MCNC: NEGATIVE MG/DL
LEUKOCYTE ESTERASE UR QL STRIP: ABNORMAL
LIPASE SERPL-CCNC: 86 U/L (ref 11–82)
LYMPHOCYTES # BLD AUTO: 1.46 THOUSANDS/ÂΜL (ref 0.6–4.47)
LYMPHOCYTES NFR BLD AUTO: 19 % (ref 14–44)
MCH RBC QN AUTO: 31 PG (ref 26.8–34.3)
MCHC RBC AUTO-ENTMCNC: 30.7 G/DL (ref 31.4–37.4)
MCV RBC AUTO: 101 FL (ref 82–98)
MONOCYTES # BLD AUTO: 0.59 THOUSAND/ÂΜL (ref 0.17–1.22)
MONOCYTES NFR BLD AUTO: 8 % (ref 4–12)
NEUTROPHILS # BLD AUTO: 5.2 THOUSANDS/ÂΜL (ref 1.85–7.62)
NEUTS SEG NFR BLD AUTO: 69 % (ref 43–75)
NITRITE UR QL STRIP: NEGATIVE
NON-SQ EPI CELLS URNS QL MICRO: NORMAL /HPF
NRBC BLD AUTO-RTO: 0 /100 WBCS
PH UR STRIP.AUTO: 6 [PH]
PLATELET # BLD AUTO: 283 THOUSANDS/UL (ref 149–390)
PMV BLD AUTO: 10 FL (ref 8.9–12.7)
POTASSIUM SERPL-SCNC: 4.8 MMOL/L (ref 3.5–5.3)
PROT SERPL-MCNC: 7.1 G/DL (ref 6.4–8.4)
PROT UR STRIP-MCNC: NEGATIVE MG/DL
RBC # BLD AUTO: 3.71 MILLION/UL (ref 3.81–5.12)
RBC #/AREA URNS AUTO: NORMAL /HPF
SODIUM SERPL-SCNC: 136 MMOL/L (ref 135–147)
SP GR UR STRIP.AUTO: 1.01 (ref 1–1.03)
UROBILINOGEN UR STRIP-ACNC: <2 MG/DL
WBC # BLD AUTO: 7.56 THOUSAND/UL (ref 4.31–10.16)
WBC #/AREA URNS AUTO: NORMAL /HPF

## 2023-08-27 PROCEDURE — 85025 COMPLETE CBC W/AUTO DIFF WBC: CPT | Performed by: EMERGENCY MEDICINE

## 2023-08-27 PROCEDURE — 80053 COMPREHEN METABOLIC PANEL: CPT | Performed by: EMERGENCY MEDICINE

## 2023-08-27 PROCEDURE — 96374 THER/PROPH/DIAG INJ IV PUSH: CPT

## 2023-08-27 PROCEDURE — 83690 ASSAY OF LIPASE: CPT | Performed by: EMERGENCY MEDICINE

## 2023-08-27 PROCEDURE — 99285 EMERGENCY DEPT VISIT HI MDM: CPT

## 2023-08-27 PROCEDURE — G1004 CDSM NDSC: HCPCS

## 2023-08-27 PROCEDURE — 96375 TX/PRO/DX INJ NEW DRUG ADDON: CPT

## 2023-08-27 PROCEDURE — 74176 CT ABD & PELVIS W/O CONTRAST: CPT

## 2023-08-27 PROCEDURE — 81001 URINALYSIS AUTO W/SCOPE: CPT | Performed by: EMERGENCY MEDICINE

## 2023-08-27 PROCEDURE — 99284 EMERGENCY DEPT VISIT MOD MDM: CPT

## 2023-08-27 PROCEDURE — 36415 COLL VENOUS BLD VENIPUNCTURE: CPT

## 2023-08-27 RX ORDER — ONDANSETRON 2 MG/ML
4 INJECTION INTRAMUSCULAR; INTRAVENOUS ONCE
Status: COMPLETED | OUTPATIENT
Start: 2023-08-27 | End: 2023-08-27

## 2023-08-27 RX ORDER — HYDROMORPHONE HCL/PF 1 MG/ML
0.5 SYRINGE (ML) INJECTION ONCE
Status: COMPLETED | OUTPATIENT
Start: 2023-08-27 | End: 2023-08-27

## 2023-08-27 RX ADMIN — ONDANSETRON 4 MG: 2 INJECTION INTRAMUSCULAR; INTRAVENOUS at 17:00

## 2023-08-27 RX ADMIN — HYDROMORPHONE HYDROCHLORIDE 0.5 MG: 1 INJECTION, SOLUTION INTRAMUSCULAR; INTRAVENOUS; SUBCUTANEOUS at 17:00

## 2023-08-27 NOTE — DISCHARGE INSTRUCTIONS
Take tylenol every 4-6 hours for pain   Rest, use heating pads, ice on your back.  Avoid heavy lifting for atleast a week  Lidocaine patches for 12 hours on and 12 hours off   Apply voltaren gel to areas  Follow up with your PCP if symptoms persist despite these treatments  Return to the ER if you develop intense back or abdominal pain that is worse or different than before, cant feel or move your legs, numbness, weakness, develop pelvic numbness, bowel or bladder incontinence, fevers, chest pain, shortness of breath, cant stop vomiting

## 2023-08-27 NOTE — ED PROVIDER NOTES
History  Chief Complaint   Patient presents with   • Flank Pain     Pt has increased right sided flank pain for a week. Pt has chronic back pain from stage 5 kidney disease. Pt denies urinary symptoms and fevers. This is a 65 y/o female with PMH stage 5 CKD, HTN, HLD who presents to the ER today with RUQ/RLQ, right flank pain that started 3 days ago. Patient says it started in her flank and radiated around to her RUQ and down to her RLQ as well. Says the pain has been progressively getting worse. She admits to some diarrhea as well as some nausea. She denies any changes in urination, blood in stools, fevers, chills, vomiting, chest pain, shortness of breath. Admits to trying tylenol for the pain but it is not helping. Only abdominal surgery in past was for a rectal prolapse. History provided by:  Patient   used: No        Prior to Admission Medications   Prescriptions Last Dose Informant Patient Reported? Taking?    AMILoride 5 mg tablet  Self No No   Sig: Take 1 tablet (5 mg total) by mouth 2 (two) times a day   QUEtiapine (SEROquel) 100 mg tablet  Self No No   Sig: Quetiapine 100mg : 1 tablet + 400mg po at night   QUEtiapine (SEROquel) 300 mg tablet  Self No No   Sig: Quetiapine 300m tablet po  in morning   QUEtiapine (SEROquel) 400 MG tablet  Self No No   Sig: Quetiapine 400m tablet + 100mg tablet po at night   acetaminophen (TYLENOL) 650 mg CR tablet  Self No No   Sig: Take 1 tablet (650 mg total) by mouth every 8 (eight) hours as needed for mild pain   Patient taking differently: Take 500 mg by mouth every 8 (eight) hours as needed for mild pain   albuterol (Ventolin HFA) 90 mcg/act inhaler  Self No No   Sig: Inhale 2 puffs every 6 (six) hours as needed for wheezing or shortness of breath   amLODIPine (NORVASC) 5 mg tablet  Self No No   Sig: Take 1 tablet (5 mg total) by mouth daily   aspirin 81 mg chewable tablet  Self No No   Sig: Chew 1 tablet (81 mg total) daily budesonide-formoterol (Symbicort) 160-4.5 mcg/act inhaler   No No   Sig: Inhale 2 puffs 2 (two) times a day Rinse mouth after use.    carvedilol (COREG) 25 mg tablet  Self No No   Sig: TAKE 1 TABLET BY MOUTH  TWICE DAILY WITH MEALS   cholecalciferol (VITAMIN D3) 1,000 units tablet  Self No No   Sig: Take 5 tablets (5,000 Units total) by mouth daily   clonazePAM (KlonoPIN) 0.5 mg tablet   No No   Sig: Take 1 tablet (0.5 mg total) by mouth 3 (three) times a day   fluvoxaMINE (LUVOX) 100 mg tablet   No No   Sig: Fluvoxamine 100m tablet in the morning ; 2 tablets at night   lamoTRIgine (LaMICtal) 200 MG tablet  Self No No   Sig: TAKE 1 TABLET BY MOUTH IN THE  MORNING AND 1 TABLET AT NIGHT   omeprazole (PriLOSEC) 40 MG capsule  Self No No   Sig: Take 1 capsule (40 mg total) by mouth daily before breakfast   ondansetron (ZOFRAN) 4 mg tablet  Self No No   Sig: Take 1 tablet (4 mg total) by mouth every 8 (eight) hours as needed for nausea or vomiting   rosuvastatin (CRESTOR) 20 MG tablet  Self No No   Sig: TAKE 1 TABLET BY MOUTH IN  THE EVENING   traMADol (Ultram) 50 mg tablet  Self No No   Sig: Take 1 tablet (50 mg total) by mouth every 12 (twelve) hours as needed for moderate pain   venlafaxine (EFFEXOR-XR) 37.5 mg 24 hr capsule   No No   Sig: Venlafaxine ER 37.5mg : 1 capsule daily in the morning x 2wk, then 2 capsules per morning      Facility-Administered Medications: None       Past Medical History:   Diagnosis Date   • Anxiety    • Anxiety disorder    • Arthritis    • At risk for falls    • Bipolar 2 disorder (HCC)    • Chronic back pain    • Chronic kidney disease    • Closed fracture of distal end of right fibula with routine healing 2020   • COVID-19     in 2021   • CVA (cerebral vascular accident) ([address])     noted on MRI in the past   • Depression    • GERD (gastroesophageal reflux disease)    • Hypercholesteremia    • Hypernatremia    • Hypertension    • Hypokalemia    • Idiopathic chronic pancreatitis ([address]) 2018   • Intervertebral disc disorder with radiculopathy of lumbosacral region     resolved: 2015   • Kidney disease    • Kidney disease    • Limb alert care status     LUE-fistula   • Panic attacks    • Pericardial effusion    • PONV (postoperative nausea and vomiting)    • Psychiatric problem    • Radiculitis     resolved: 2015   • Secondary renal hyperparathyroidism ([address])    • Stroke [facility])    • Vitamin D deficiency        Past Surgical History:   Procedure Laterality Date   • BUNIONECTOMY      Left foot    • CAST APPLICATION Right     Procedure: Application short-arm thumb spica splint;  Surgeon: Sadiq Gross MD;  Location: [facility];  Service: Orthopedics   • COLON SURGERY     • COLONOSCOPY  2021   • DILATION AND CURETTAGE OF UTERUS     • INDUCED       surgically induced   • OH ARTERIOVENOUS ANASTOMOSIS OPEN DIRECT Left 2019    Procedure: CREATION FISTULA ARTERIOVENOUS (AV) left wrists possible left upper;  Surgeon: Joseph Perla MD;  Location[facility];  Service: Vascular   • OH [place] W/SESMDC W/DIST Tressia Vish Left 2019    Procedure: Efrain Nordmann;  Surgeon: Eva Marsh DPM;  Location[facility];  Service: Podiatry   • OH CORRJ [address] W/SESMDC W/DIST [place] Right 8/3/2020    Procedure: Army Stade;  Surgeon: Eva Marsh DPM;  Location: [facility];  Service: Podiatry   • OH CORRJ HALLUX VALGUS W/SESMDC W/PROX PHLNX OSTEOT Right 2021    Procedure: Consuello Goodman, right tameka osteotomy and 2nd claw toe correction;  Surgeon: oDni Mckeon MD;  Location: [facility];  Service: Orthopedics   • OH ERCP DX COLLECTION SPECIMEN BRUSHING/WASHING N/A 2018    Procedure: ENDOSCOPIC RETROGRADE CHOLANGIOPANCREATOGRAPHY (ERCP);   Surgeon: Dustin Pond MD;  [facility];  Service: Gastroenterology   • OH LAPAROSCOPY PROCTOPEXY PROLAPSE N/A 2018    Procedure: ROBOTIC SIGMOID RESECTION / RECTOPEXY;  Surgeon: Naseem Hawk MD;  Location[facility];  Service: Colorectal   • ME OPEN TREATMENT RADIAL SHAFT FRACTURE Right 10/11/2021    Procedure: OPEN REDUCTION W/ INTERNAL FIXATION (ORIF) RADIUS (WRIST), RIGHT DISTAL;  Surgeon: Melissa Pineda MD;  Location[facility];  Service: Orthopedics   • ME REMOVAL IMPLANT DEEP Right 6/23/2022    Procedure: Removal of hardware volar aspect right distal radius (distal radial plate and screws);   Surgeon: Sadiq rGoss MD;  Location[facility];  Service: Orthopedics   • ME SIGMOIDOSCOPY FLX DX W/COLLJ SPEC BR/WA IF PFRMD N/A 7/13/2018    Procedure: Micheline Velasco;  Surgeon: Naseem Hawk MD;  [facility];  Service: Colorectal   • ME TR TDN RESTORE INTRNSC FUNCJ ALL 4 FNGRS Right 6/23/2022    Procedure: Right ring finger flexor digitorum superficialis to flexor pollicis longus tendon transfer;  Surgeon: Sadiq Gross MD;  Location[facility];  Service: Orthopedics   • TUBAL LIGATION Bilateral 1997   • [address] THYROID BIOPSY  7/30/2019       Family History   Problem Relation Age of Onset   • Bipolar disorder Mother    • Mental illness Mother         depression   • Stroke Mother    • Dementia Mother    • Colon polyps Mother    • Heart disease Father    • Hypertension Father    • Diabetes Father    • Other Family         Back disorder   • Diabetes Family    • Heart disease Family    • Hypertension Family    • Stroke Family    • Thyroid disease Family    • Breast cancer Paternal Grandmother         age unknown   • Breast cancer Paternal Aunt         age unknown   • Breast cancer Maternal Aunt         age unknown   • Mental illness Sister    • Colon polyps Sister    • Mental illness Sister    • Heart disease Sister    • No Known Problems Sister    • Breast cancer Sister 76   • Other Son         pituitary tumor   • Hypertension Son    • Obesity Son    • No Known Problems Son    • No Known Problems Maternal Grandmother    • No Known Problems Maternal Grandfather    • No Known Problems Paternal Grandfather    • Breast cancer Paternal Aunt         age unknown   • Substance Abuse Neg Hx         neg fam hx   • Colon cancer Neg Hx      I have reviewed and agree with the history as documented. E-Cigarette/Vaping   • E-Cigarette Use Never User      E-Cigarette/Vaping Substances   • Nicotine No    • THC No    • CBD No    • Flavoring No    • Other No    • Unknown No      Social History     Tobacco Use   • Smoking status: Never   • Smokeless tobacco: Never   Vaping Use   • Vaping Use: Never used   Substance Use Topics   • Alcohol use: Never   • Drug use: Not Currently       Review of Systems   Constitutional: Negative for chills and fever. Respiratory: Negative for chest tightness and shortness of breath. Cardiovascular: Negative for chest pain. Gastrointestinal: Positive for abdominal pain, diarrhea and nausea. Negative for blood in stool, constipation and vomiting. Genitourinary: Negative for difficulty urinating, dysuria, frequency and urgency. Skin: Negative for color change. Neurological: Negative for headaches. Psychiatric/Behavioral: Negative for behavioral problems and sleep disturbance. All other systems reviewed and are negative. Physical Exam  Physical Exam  Vitals and nursing note reviewed. Constitutional:       General: She is awake. Appearance: Normal appearance. She is well-developed. She is obese. HENT:      Head: Normocephalic and atraumatic. Right Ear: External ear normal.      Left Ear: External ear normal.      Nose: Nose normal.   Eyes:      General: No scleral icterus. Extraocular Movements: Extraocular movements intact. Cardiovascular:      Rate and Rhythm: Normal rate and regular rhythm. Heart sounds: Normal heart sounds, S1 normal and S2 normal. No murmur heard. No gallop.    Pulmonary:      Effort: Pulmonary effort is normal.      Breath sounds: Normal breath sounds. No wheezing, rhonchi or rales. Abdominal:      General: Abdomen is protuberant. Bowel sounds are normal.      Palpations: Abdomen is soft. Tenderness: There is abdominal tenderness in the right upper quadrant and right lower quadrant. There is right CVA tenderness. There is no guarding or rebound. Musculoskeletal:         General: Normal range of motion. Cervical back: Normal range of motion. Skin:     General: Skin is warm and dry. Neurological:      General: No focal deficit present. Mental Status: She is alert. Psychiatric:         Attention and Perception: Attention and perception normal.         Mood and Affect: Mood normal.         Behavior: Behavior normal. Behavior is cooperative.          Vital Signs  ED Triage Vitals   Temperature Pulse Respirations Blood Pressure SpO2   08/27/23 1605 08/27/23 1603 08/27/23 1603 08/27/23 1603 08/27/23 1603   97.8 °F (36.6 °C) 91 18 162/86 97 %      Temp Source Heart Rate Source Patient Position - Orthostatic VS BP Location FiO2 (%)   08/27/23 1605 08/27/23 1701 08/27/23 1701 08/27/23 1701 --   Temporal Monitor Sitting Right arm       Pain Score       08/27/23 1603       7           Vitals:    08/27/23 1603 08/27/23 1701   BP: 162/86 146/63   Pulse: 91 84   Patient Position - Orthostatic VS:  Sitting         Visual Acuity      ED Medications  Medications   HYDROmorphone (DILAUDID) injection 0.5 mg (0.5 mg Intravenous Given 8/27/23 1700)   ondansetron (ZOFRAN) injection 4 mg (4 mg Intravenous Given 8/27/23 1700)       Diagnostic Studies  Results Reviewed     Procedure Component Value Units Date/Time    Urine Microscopic [[id number]]  (Normal) Collected: 08/27/23 1659    Lab Status: Final result Specimen: Urine, Clean Catch Updated: 08/27/23 1806     RBC, UA None Seen /hpf      WBC, UA 1-2 /hpf      Epithelial Cells Occasional /hpf      Bacteria, UA Occasional /hpf     UA w Reflex to Microscopic w Reflex to Culture [[id number]] (Abnormal) Collected: 08/27/23 1659    Lab Status: Final result Specimen: Urine, Clean Catch Updated: 08/27/23 1723     Color, UA Light Yellow     Clarity, UA Clear     Specific Gravity, UA 1.010     pH, UA 6.0     Leukocytes, UA Small     Nitrite, UA Negative     Protein, UA Negative mg/dl      Glucose, UA Negative mg/dl      Ketones, UA Negative mg/dl      Urobilinogen, UA <2.0 mg/dl      Bilirubin, UA Negative     Occult Blood, UA Negative    Comprehensive metabolic panel [[id number]]  (Abnormal) Collected: 08/27/23 1607    Lab Status: Final result Specimen: Blood from Arm, Right Updated: 08/27/23 1637     Sodium 136 mmol/L      Potassium 4.8 mmol/L      Chloride 106 mmol/L      CO2 21 mmol/L      ANION GAP 9 mmol/L      BUN 44 mg/dL      Creatinine 2.51 mg/dL      Glucose 95 mg/dL      Calcium 9.7 mg/dL      AST 17 U/L      ALT 14 U/L      Alkaline Phosphatase 224 U/L      Total Protein 7.1 g/dL      Albumin 4.5 g/dL      Total Bilirubin 0.27 mg/dL      eGFR 20 ml/min/1.73sq m     Narrative:      [place] guidelines for Chronic Kidney Disease (CKD):   •  Stage 1 with normal or high GFR (GFR > 90 mL/min/1.73 square meters)  •  Stage 2 Mild CKD (GFR = 60-89 mL/min/1.73 square meters)  •  Stage 3A Moderate CKD (GFR = 45-59 mL/min/1.73 square meters)  •  Stage 3B Moderate CKD (GFR = 30-44 mL/min/1.73 square meters)  •  Stage 4 Severe CKD (GFR = 15-29 mL/min/1.73 square meters)  •  Stage 5 End Stage CKD (GFR <15 mL/min/1.73 square meters)  Note: GFR calculation is accurate only with a steady state creatinine    Lipase [[id number]]  (Abnormal) Collected: 08/27/23 1607    Lab Status: Final result Specimen: Blood from Arm, Right Updated: 08/27/23 1637     Lipase 86 u/L     CBC and differential [[id number]]  (Abnormal) Collected: 08/27/23 1607    Lab Status: Final result Specimen: Blood from Arm, Right Updated: 08/27/23 1618     WBC 7.56 Thousand/uL      RBC 3.71 Million/uL      Hemoglobin 11.5 g/dL      Hematocrit 37.4 %       fL      MCH 31.0 pg      MCHC 30.7 g/dL      RDW 13.0 %      MPV 10.0 fL      Platelets 800 Thousands/uL      nRBC 0 /100 WBCs      Neutrophils Relative 69 %      Immat GRANS % 0 %      Lymphocytes Relative 19 %      Monocytes Relative 8 %      Eosinophils Relative 3 %      Basophils Relative 1 %      Neutrophils Absolute 5.20 Thousands/µL      Immature Grans Absolute 0.03 Thousand/uL      Lymphocytes Absolute 1.46 Thousands/µL      Monocytes Absolute 0.59 Thousand/µL      Eosinophils Absolute 0.20 Thousand/µL      Basophils Absolute 0.08 Thousands/µL                  CT abdomen pelvis wo contrast   Final Result by Berkley Frazier MD (08/27 1806)      No acute intra-abdominal abnormality. Normal appendix. Persistent hyperdense foci noted within the cecum. Workstation performed: [id number]                    Procedures  Procedures         ED Course  ED Course as of 08/27/23 1911   Sun Aug 27, 2023   1646 Creatinine(!): 2.51  Similar to baseline, stage 5 CKD           Medical Decision Making  65 y/o female here for right abdominal/flank pain  Differential diagnosis including but not limited to: cholecystitis, appendicitis, ureterolithiasis, UTI, pyelonephritis, diverticulitis, colitis  Assessment: flank pain, anterolisthesis of lumbosacral spine  Plan: labs unremarkable other than chronic findings of elevated alk phos and CKD. CT doesn't show any acute findings, suspect radiating pain from back. Advised tylenol, lidocaine patches, voltaren gel, and follow up with PCP. She  was given strict return to ER precautions both verbally and in discharge papers. Patient verbalized understanding and agrees with plan. Amount and/or Complexity of Data Reviewed  Labs: ordered. Decision-making details documented in ED Course. Radiology: ordered. Risk  Prescription drug management.           Disposition  Final diagnoses:   Flank pain   Anterolisthesis of lumbosacral spine     Time reflects when diagnosis was documented in both MDM as applicable and the Disposition within this note     Time User Action Codes Description Comment    8/27/2023  6:21 PM Mena Velascojenny Add [R10.9] Flank pain     8/27/2023  6:23 PM Mena Velascojenny Add [M43.17] Anterolisthesis of lumbosacral spine       ED Disposition     ED Disposition   Discharge    Condition   Stable    Date/Time   Sun Aug 27, 2023  6:21 PM    [facility] discharge to home/self care.                Follow-up Information     Follow up With Specialties Details Why Contact Info Additional [facility], DO Internal Medicine Schedule an appointment as soon as possible for a visit   [address] [facility]  [address]  [address] [facility] [address]  [facility] Emergency Department Emergency Medicine Go to  if you develop intense back or abdominal pain that is worse or different than before, cant feel or move your legs, numbness, weakness, develop pelvic numbness, bowel or bladder incontinence, fevers, chest pain, shortness of breath, cant stop vomiting [address] [facility] Emergency Department, [address] [facility] [address]          Discharge Medication List as of 8/27/2023  6:26 PM      CONTINUE these medications which have NOT CHANGED    Details   acetaminophen (TYLENOL) 650 mg CR tablet Take 1 tablet (650 mg total) by mouth every 8 (eight) hours as needed for mild pain, Starting Thu 6/23/2022, Normal      albuterol (Ventolin HFA) 90 mcg/act inhaler Inhale 2 puffs every 6 (six) hours as needed for wheezing or shortness of breath, Starting Thu 4/27/2023, Normal      AMILoride 5 mg tablet Take 1 tablet (5 mg total) by mouth 2 (two) times a day, Starting Wed 2/1/2023, Normal      amLODIPine (NORVASC) 5 mg tablet Take 1 tablet (5 mg total) by mouth daily, Starting Mon 2/13/2023, Normal      aspirin 81 mg chewable tablet Chew 1 tablet (81 mg total) daily, Starting 2021, No Print      budesonide-formoterol (Symbicort) 160-4.5 mcg/act inhaler Inhale 2 puffs 2 (two) times a day Rinse mouth after use., Starting Thu 8/10/2023, Normal      carvedilol (COREG) 25 mg tablet TAKE 1 TABLET BY MOUTH  TWICE DAILY WITH MEALS, Normal      cholecalciferol (VITAMIN D3) 1,000 units tablet Take 5 tablets (5,000 Units total) by mouth daily, Starting 2022, Normal      clonazePAM (KlonoPIN) 0.5 mg tablet Take 1 tablet (0.5 mg total) by mouth 3 (three) times a day, Starting 2023, Until Wed 11/15/2023, Normal      fluvoxaMINE (LUVOX) 100 mg tablet Fluvoxamine 100m tablet in the morning ; 2 tablets at night, Normal      lamoTRIgine (LaMICtal) 200 MG tablet TAKE 1 TABLET BY MOUTH IN THE  MORNING AND 1 TABLET AT NIGHT, Normal      omeprazole (PriLOSEC) 40 MG capsule Take 1 capsule (40 mg total) by mouth daily before breakfast, Starting 2023, Normal      ondansetron (ZOFRAN) 4 mg tablet Take 1 tablet (4 mg total) by mouth every 8 (eight) hours as needed for nausea or vomiting, Starting 2023, Normal      !! QUEtiapine (SEROquel) 100 mg tablet Quetiapine 100mg : 1 tablet + 400mg po at night, Normal      !! QUEtiapine (SEROquel) 300 mg tablet Quetiapine 300m tablet po  in morning, Normal      !! QUEtiapine (SEROquel) 400 MG tablet Quetiapine 400m tablet + 100mg tablet po at night, Normal      rosuvastatin (CRESTOR) 20 MG tablet TAKE 1 TABLET BY MOUTH IN  THE EVENING, Normal      traMADol (Ultram) 50 mg tablet Take 1 tablet (50 mg total) by mouth every 12 (twelve) hours as needed for moderate pain, Starting 2023, Normal      venlafaxine (EFFEXOR-XR) 37.5 mg 24 hr capsule Venlafaxine ER 37.5mg : 1 capsule daily in the morning x 2wk, then 2 capsules per morning, Normal       !! - Potential duplicate medications found. Please discuss with provider.           No discharge procedures on file.     PDMP Review       Value Time User    PDMP Reviewed  Yes 8/17/2023 11:18 AM Oziel Wilkins, [address] [place] [address]          ED Provider  Electronically Signed by           Nicole Medina PA-C  08/27/23 1912

## 2023-08-28 ENCOUNTER — APPOINTMENT (OUTPATIENT)
Dept: PHYSICAL THERAPY | Facility: CLINIC | Age: 63
End: 2023-08-28
Payer: MEDICARE

## 2023-08-29 ENCOUNTER — OFFICE VISIT (OUTPATIENT)
Dept: FAMILY MEDICINE CLINIC | Facility: HOSPITAL | Age: 63
End: 2023-08-29
Payer: MEDICARE

## 2023-08-29 VITALS
DIASTOLIC BLOOD PRESSURE: 76 MMHG | HEART RATE: 83 BPM | OXYGEN SATURATION: 97 % | BODY MASS INDEX: 34.14 KG/M2 | SYSTOLIC BLOOD PRESSURE: 134 MMHG | WEIGHT: 218 LBS

## 2023-08-29 DIAGNOSIS — M54.50 LUMBAR PAIN: ICD-10-CM

## 2023-08-29 DIAGNOSIS — M79.605 BILATERAL LEG PAIN: ICD-10-CM

## 2023-08-29 DIAGNOSIS — M79.604 BILATERAL LEG PAIN: ICD-10-CM

## 2023-08-29 DIAGNOSIS — H61.22 HEARING LOSS OF LEFT EAR DUE TO CERUMEN IMPACTION: Primary | ICD-10-CM

## 2023-08-29 PROCEDURE — 69209 REMOVE IMPACTED EAR WAX UNI: CPT | Performed by: INTERNAL MEDICINE

## 2023-08-29 PROCEDURE — 99213 OFFICE O/P EST LOW 20 MIN: CPT | Performed by: INTERNAL MEDICINE

## 2023-08-29 RX ORDER — TRAMADOL HYDROCHLORIDE 50 MG/1
50 TABLET ORAL EVERY 12 HOURS PRN
Qty: 60 TABLET | Refills: 0 | Status: SHIPPED | OUTPATIENT
Start: 2023-08-29

## 2023-08-29 NOTE — PROGRESS NOTES
Assessment/Plan:     Diagnosis ICD-10-CM Associated Orders   1. Hearing loss of left ear due to cerumen impaction  H61.22           Problem List Items Addressed This Visit    None  Visit Diagnoses     Hearing loss of left ear due to cerumen impaction    -  Primary            No follow-ups on file. Subjective:    Patient ID: Kirit Mueller is a 66 y.o. female    Here for left cerume removal due to loss of hearing - had been using debrox      The following portions of the patient's history were reviewed and updated as appropriate: allergies, current medications and problem list.     Review of Systems   HENT: Positive for hearing loss. Negative for congestion. All other systems reviewed and are negative.         Objective:      Current Outpatient Medications:   •  acetaminophen (TYLENOL) 650 mg CR tablet, Take 1 tablet (650 mg total) by mouth every 8 (eight) hours as needed for mild pain (Patient taking differently: Take 500 mg by mouth every 8 (eight) hours as needed for mild pain), Disp: 30 tablet, Rfl: 0  •  albuterol (Ventolin HFA) 90 mcg/act inhaler, Inhale 2 puffs every 6 (six) hours as needed for wheezing or shortness of breath, Disp: 8.5 g, Rfl: 3  •  AMILoride 5 mg tablet, Take 1 tablet (5 mg total) by mouth 2 (two) times a day, Disp: 180 tablet, Rfl: 3  •  amLODIPine (NORVASC) 5 mg tablet, Take 1 tablet (5 mg total) by mouth daily, Disp: 90 tablet, Rfl: 3  •  aspirin 81 mg chewable tablet, Chew 1 tablet (81 mg total) daily, Disp: , Rfl:   •  budesonide-formoterol (Symbicort) 160-4.5 mcg/act inhaler, Inhale 2 puffs 2 (two) times a day Rinse mouth after use., Disp: 10.2 g, Rfl: 11  •  carvedilol (COREG) 25 mg tablet, TAKE 1 TABLET BY MOUTH  TWICE DAILY WITH MEALS, Disp: 180 tablet, Rfl: 3  •  cholecalciferol (VITAMIN D3) 1,000 units tablet, Take 5 tablets (5,000 Units total) by mouth daily, Disp: 90 tablet, Rfl: 5  •  clonazePAM (KlonoPIN) 0.5 mg tablet, Take 1 tablet (0.5 mg total) by mouth 3 (three) times a day, Disp: 270 tablet, Rfl: 0  •  fluvoxaMINE (LUVOX) 100 mg tablet, Fluvoxamine 100m tablet in the morning ; 2 tablets at night, Disp: 270 tablet, Rfl: 1  •  lamoTRIgine (LaMICtal) 200 MG tablet, TAKE 1 TABLET BY MOUTH IN THE  MORNING AND 1 TABLET AT NIGHT, Disp: 180 tablet, Rfl: 3  •  omeprazole (PriLOSEC) 40 MG capsule, Take 1 capsule (40 mg total) by mouth daily before breakfast, Disp: 90 capsule, Rfl: 3  •  ondansetron (ZOFRAN) 4 mg tablet, Take 1 tablet (4 mg total) by mouth every 8 (eight) hours as needed for nausea or vomiting, Disp: 60 tablet, Rfl: 1  •  QUEtiapine (SEROquel) 100 mg tablet, Quetiapine 100mg : 1 tablet + 400mg po at night, Disp: 90 tablet, Rfl: 0  •  QUEtiapine (SEROquel) 300 mg tablet, Quetiapine 300m tablet po  in morning, Disp: 90 tablet, Rfl: 0  •  QUEtiapine (SEROquel) 400 MG tablet, Quetiapine 400m tablet + 100mg tablet po at night, Disp: 90 tablet, Rfl: 0  •  rosuvastatin (CRESTOR) 20 MG tablet, TAKE 1 TABLET BY MOUTH IN  THE EVENING, Disp: 90 tablet, Rfl: 3  •  traMADol (Ultram) 50 mg tablet, Take 1 tablet (50 mg total) by mouth every 12 (twelve) hours as needed for moderate pain, Disp: 60 tablet, Rfl: 0  •  venlafaxine (EFFEXOR-XR) 37.5 mg 24 hr capsule, Venlafaxine ER 37.5mg : 1 capsule daily in the morning x 2wk, then 2 capsules per morning, Disp: 60 capsule, Rfl: 1    Blood pressure 134/76, pulse 83, weight 98.9 kg (218 lb), SpO2 97 %, not currently breastfeeding. Physical Exam  Vitals and nursing note reviewed. Constitutional:       General: She is not in acute distress. HENT:      Left Ear: There is impacted cerumen. Neurological:      Mental Status: She is alert. Ear cerumen removal    Date/Time: 2023 2:20 PM    Performed by: Cristal Balderas DO  Authorized by: Cristal Balderas DO  Universal Protocol:  Consent: Verbal consent obtained.   Patient understanding: patient states understanding of the procedure being performed  Patient identity confirmed: verbally with patient      Procedure details:     Local anesthetic:  None    Location:  L ear    Procedure type: irrigation only      Approach:  External  Post-procedure details:     Complication:  None

## 2023-08-30 ENCOUNTER — OFFICE VISIT (OUTPATIENT)
Dept: PHYSICAL THERAPY | Facility: CLINIC | Age: 63
End: 2023-08-30
Payer: MEDICARE

## 2023-08-30 DIAGNOSIS — M17.11 PRIMARY OSTEOARTHRITIS OF RIGHT KNEE: ICD-10-CM

## 2023-08-30 DIAGNOSIS — M17.12 PRIMARY OSTEOARTHRITIS OF LEFT KNEE: Primary | ICD-10-CM

## 2023-08-30 PROCEDURE — 97110 THERAPEUTIC EXERCISES: CPT

## 2023-08-30 PROCEDURE — 97530 THERAPEUTIC ACTIVITIES: CPT

## 2023-08-30 PROCEDURE — 97112 NEUROMUSCULAR REEDUCATION: CPT

## 2023-08-30 NOTE — PROGRESS NOTES
Daily Note     Today's date: 2023  Patient name: Jordan Worthy  : 1960  MRN: [id number]  Referring provider: José Oliver PA-C  Dx:   Encounter Diagnosis     ICD-10-CM    1. Primary osteoarthritis of left knee  M17.12       2. Primary osteoarthritis of right knee  M17.11           Start Time: 1130  Stop Time: 1212  Total time in clinic (min): 42 minutes    Subjective: Pt reports her knees are feeling sore today and had a hard time getting up this past Friday. Objective: See treatment diary below      Assessment: Tolerated treatment well. Required VC's for proper form/pace for ex's as listed below. Educated pt on strategies for improved ease with sit to stand transfers, as transfers are challenging for her. Fatigues easily with supine SLR's. Required some min A with ham curls with theraball. Patient demonstrated fatigue post treatment, exhibited good technique with therapeutic exercises and would benefit from continued PT. Plan: Continue per plan of care.       EPOC: 10/18/23    Manuals            L Knee PROM             Gentle L tibiofemoral mobs                                       Neuro Re-Ed             L quad set             HS curl w/swiss ball x25 x25           SLR iso x25 3" ea x25 3" ea            SAQ iso             LAQ iso x25 2lb 5" ea x25 2lbs  5" ea           Reverse squat slant x25 5" x25 5"                         Ther Ex             HR x25 x25           TR x25 x25           Slant board 10x10" 10x10"           Clam shell supine x25 Nash TB  x25 Nash TB                                                  L Knee TERT with heat Into EXT             Ther Activity             LE Bike for improved L knee ROM 6' L2 6' L2           Pt education: pathoanatomy, nature of sxs, POC, HEP 2' NS 2' NA           Gait Training                                       Modalities

## 2023-09-05 ENCOUNTER — OFFICE VISIT (OUTPATIENT)
Dept: PHYSICAL THERAPY | Facility: CLINIC | Age: 63
End: 2023-09-05
Payer: MEDICARE

## 2023-09-05 DIAGNOSIS — M17.11 PRIMARY OSTEOARTHRITIS OF RIGHT KNEE: ICD-10-CM

## 2023-09-05 DIAGNOSIS — K59.03 DRUG-INDUCED CONSTIPATION: ICD-10-CM

## 2023-09-05 DIAGNOSIS — M17.12 PRIMARY OSTEOARTHRITIS OF LEFT KNEE: Primary | ICD-10-CM

## 2023-09-05 PROCEDURE — 97530 THERAPEUTIC ACTIVITIES: CPT | Performed by: PHYSICAL THERAPIST

## 2023-09-05 PROCEDURE — 97110 THERAPEUTIC EXERCISES: CPT | Performed by: PHYSICAL THERAPIST

## 2023-09-05 PROCEDURE — 97112 NEUROMUSCULAR REEDUCATION: CPT | Performed by: PHYSICAL THERAPIST

## 2023-09-05 RX ORDER — LAMOTRIGINE 200 MG/1
TABLET ORAL
Qty: 60 TABLET | Refills: 1 | Status: SHIPPED | OUTPATIENT
Start: 2023-09-05 | End: 2023-09-14 | Stop reason: SDUPTHER

## 2023-09-05 RX ORDER — LAMOTRIGINE 200 MG/1
TABLET ORAL
Qty: 180 TABLET | Refills: 0 | OUTPATIENT
Start: 2023-09-05

## 2023-09-05 NOTE — TELEPHONE ENCOUNTER
Pt requested refill of the lamotrigine 200mg    She changed pharmacy due to insurance change and can't use the refill on file at [facility], needs to go to [facility] local [facility] instead

## 2023-09-05 NOTE — PROGRESS NOTES
Daily Note     Today's date: 2023  Patient name: Daniela Red  : 1960  MRN: [id number]  Referring provider: Brenden Hurley PA-C  Dx:   Encounter Diagnosis     ICD-10-CM    1. Primary osteoarthritis of left knee  M17.12       2. Primary osteoarthritis of right knee  M17.11           Subjective: Compliant with HEP, no questions regarding POC, motivated to continue PT    Objective: See treatment diary below      Assessment: Tolerated treatment well with initiation of full treatment program as noted below requiring verbal and tactile cues from PT for safe execution of therapeutic exercise. Patient demonstrated fatigue post treatment, exhibited good technique with therapeutic exercises and would benefit from continued PT      Plan: Progress treatment as tolerated.        EPOC: 10/18/23    Manuals 9/5            L Knee PROM             Gentle L tibiofemoral mobs                                       Neuro Re-Ed             L quad set             HS curl w/swiss ball x25            SLR iso x25 3" ea            SAQ iso 3lb x30            LAQ iso 3lb x30            Reverse squat slant x25 5"                         Ther Ex             HR x25            TR x25            Slant board 10x10"            Clam shell supine x25 Peach TB             Leg Press B/l 85lbs x20  SL 55lb x20 ea                                      L Knee TERT with heat Into EXT             Ther Activity             LE Bike for improved L knee ROM 6' L3            Pt education: pathoanatomy, nature of sxs, POC, HEP 2' NS            Gait Training                                       Modalities

## 2023-09-07 ENCOUNTER — TELEPHONE (OUTPATIENT)
Dept: FAMILY MEDICINE CLINIC | Facility: HOSPITAL | Age: 63
End: 2023-09-07

## 2023-09-07 ENCOUNTER — OFFICE VISIT (OUTPATIENT)
Dept: PHYSICAL THERAPY | Facility: CLINIC | Age: 63
End: 2023-09-07
Payer: MEDICARE

## 2023-09-07 DIAGNOSIS — M17.12 PRIMARY OSTEOARTHRITIS OF LEFT KNEE: Primary | ICD-10-CM

## 2023-09-07 DIAGNOSIS — M17.11 PRIMARY OSTEOARTHRITIS OF RIGHT KNEE: ICD-10-CM

## 2023-09-07 PROCEDURE — 97112 NEUROMUSCULAR REEDUCATION: CPT | Performed by: PHYSICAL THERAPIST

## 2023-09-07 PROCEDURE — 97110 THERAPEUTIC EXERCISES: CPT | Performed by: PHYSICAL THERAPIST

## 2023-09-07 NOTE — PROGRESS NOTES
Daily Note     Today's date: 2023  Patient name: Gael Song  : 1960  MRN: [id number]  Referring provider: Alma Saucedo PA-C  Dx:   Encounter Diagnosis     ICD-10-CM    1. Primary osteoarthritis of left knee  M17.12       2. Primary osteoarthritis of right knee  M17.11         Subjective: Compliant with HEP, no questions regarding POC, motivated to continue PT     Objective: See treatment diary below    Assessment: Tolerated treatment well with initiation of full treatment program as noted below requiring verbal and tactile cues from PT for safe execution of therapeutic exercise. Patient demonstrated fatigue post treatment, exhibited good technique with therapeutic exercises and would benefit from continued PT    Plan: Progress treatment as tolerated.        EPOC: 10/18/23    Manuals /            L Knee PROM             Gentle L tibiofemoral mobs                                       Neuro Re-Ed             L quad set             HS curl w/swiss ball x25            SLR iso x25 3" ea            SAQ iso 3lb x30            LAQ iso 3lb x30            Reverse squat slant x25 5"                         Ther Ex             HR x25            TR x25            Slant board 10x10"            Clam shell supine x25 Peach TB             Leg Press B/l 85lbs x20  SL 55lb x20 ea                                      L Knee TERT with heat Into EXT             Ther Activity             LE Bike for improved L knee ROM 6' L3            Pt education: pathoanatomy, nature of sxs, POC, HEP 2' NS            Gait Training                                       Modalities

## 2023-09-08 ENCOUNTER — HOSPITAL ENCOUNTER (OUTPATIENT)
Dept: NON INVASIVE DIAGNOSTICS | Age: 63
Discharge: HOME | End: 2023-09-08
Attending: INTERNAL MEDICINE
Payer: COMMERCIAL

## 2023-09-08 ENCOUNTER — TELEPHONE (OUTPATIENT)
Dept: NEPHROLOGY | Facility: CLINIC | Age: 63
End: 2023-09-08

## 2023-09-08 VITALS
WEIGHT: 218 LBS | SYSTOLIC BLOOD PRESSURE: 134 MMHG | HEART RATE: 81 BPM | HEIGHT: 67 IN | BODY MASS INDEX: 34.21 KG/M2 | DIASTOLIC BLOOD PRESSURE: 76 MMHG

## 2023-09-08 DIAGNOSIS — I35.0 AORTIC STENOSIS, MILD: ICD-10-CM

## 2023-09-08 DIAGNOSIS — N18.4 CKD (CHRONIC KIDNEY DISEASE) STAGE 4, GFR 15-29 ML/MIN (HCC): Primary | ICD-10-CM

## 2023-09-08 LAB
AORTIC ROOT: 3.4 CM
AORTIC VALVE MEAN VELOCITY: 16.5 M/S
APICAL FOUR CHAMBER EJECTION FRACTION: 70 %
AV AREA BY CONTINUOUS VTI: 1.3 CM2
AV AREA PEAK VELOCITY: 1.2 CM2
AV LVOT MEAN GRADIENT: 3 MMHG
AV LVOT PEAK GRADIENT: 4 MMHG
AV MEAN GRADIENT: 14 MMHG
AV PEAK GRADIENT: 29 MMHG
AV VALVE AREA: 1.24 CM2
AV VELOCITY RATIO: 0.38
DOP CALC AO PEAK VEL: 2.71 M/S
DOP CALC AO VTI: 60 CM
DOP CALC LVOT AREA: 3.14 CM2
DOP CALC LVOT CARDIAC INDEX: 2.37 L/MIN/M2
DOP CALC LVOT CARDIAC OUTPUT: 4.99 L/MIN
DOP CALC LVOT DIAMETER: 2 CM
DOP CALC LVOT PEAK VEL VTI: 23.7 CM
DOP CALC LVOT PEAK VEL: 1.04 M/S
DOP CALC LVOT STROKE INDEX: 34.8 ML/M2
DOP CALC LVOT STROKE VOLUME: 74.42 CM3
DOP CALC MV VTI: 25.38 CM
E WAVE DECELERATION TIME: 138 MS
FRACTIONAL SHORTENING: 29 % (ref 28–44)
INTERVENTRICULAR SEPTUM IN DIASTOLE (PARASTERNAL SHORT AXIS VIEW): 1.2 CM
INTERVENTRICULAR SEPTUM: 1.2 CM (ref 0.6–1.1)
LAAS-AP2: 21.1 CM2
LAAS-AP4: 19.5 CM2
LEFT ATRIUM SIZE: 4.3 CM
LEFT ATRIUM VOLUME (MOD BIPLANE): 67 ML
LEFT INTERNAL DIMENSION IN SYSTOLE: 2.7 CM (ref 2.1–4)
LEFT VENTRICLE DIASTOLIC VOLUME (MOD BIPLANE): 141 ML
LEFT VENTRICLE SYSTOLIC VOLUME (MOD BIPLANE): 44 ML
LEFT VENTRICULAR INTERNAL DIMENSION IN DIASTOLE: 3.8 CM (ref 3.5–6)
LEFT VENTRICULAR POSTERIOR WALL IN END DIASTOLE: 1 CM
LEFT VENTRICULAR STROKE VOLUME: 33 ML
LV EF: 69 %
LVSV (TEICH): 33 ML
MV E'TISSUE VEL-LAT: 12 CM/S
MV E'TISSUE VEL-SEP: 8 CM/S
MV MEAN GRADIENT: 2 MMHG
MV PEAK A VEL: 0.67 M/S
MV PEAK E VEL: 113 CM/S
MV PEAK GRADIENT: 4 MMHG
MV STENOSIS PRESSURE HALF TIME: 40 MS
MV VALVE AREA BY CONTINUITY EQUATION: 2.93 CM2
MV VALVE AREA P 1/2 METHOD: 5.5 CM2
RIGHT ATRIAL 2D VOLUME: 34 ML
RIGHT ATRIUM AREA SYSTOLE A4C: 13.9 CM2
RIGHT VENTRICLE ID DIMENSION: 3.8 CM
SL CV LEFT ATRIUM LENGTH A2C: 5.4 CM
SL CV LV EF: 65
SL CV PED ECHO LEFT VENTRICLE DIASTOLIC VOLUME (MOD BIPLANE) 2D: 60 ML
SL CV PED ECHO LEFT VENTRICLE SYSTOLIC VOLUME (MOD BIPLANE) 2D: 27 ML
TRICUSPID ANNULAR PLANE SYSTOLIC EXCURSION: 2.6 CM

## 2023-09-08 PROCEDURE — 93306 TTE W/DOPPLER COMPLETE: CPT

## 2023-09-08 PROCEDURE — 93306 TTE W/DOPPLER COMPLETE: CPT | Performed by: INTERNAL MEDICINE

## 2023-09-08 NOTE — PATIENT INSTRUCTIONS
Karely Zuniga [id number]   Thanks for presenting to today's Appointment at [facility]  Venlafaxine was increased to 150mg  capsule in the morning   Stop Venlafaxine 37.5mg x 2   Your  other medications were not changed

## 2023-09-08 NOTE — PSYCH
[facility]: [address] [facility] [place], [address] [facility]    Psychiatric Progress Note  MRN#: [id number]  Mary Glover 66 y.o. female    This note was not shared with the patient due to reasonable likelihood of causing patient harm       _________________________________________________________________________________________________________________________________  OFFICE APPOINTMENT   Seen today at [address] [facility] location                                                Patient Mary Glover ,1960   Prescriber/Physician: Tono Marin DO Physician Location:   [address]     • This service was provided in the office. Patient is currently located in the [place], where I am  licensed. • Patient gave consent to proceed with encounter; acknowledge understanding of security and privacy of encounter   • Patient identity was verified as well as the [facility] chart  • Patient verbalized understanding evaluation only involves Psychiatric diagnosing, prescribing, result monitoring   • Patient was informed this is a billable service and legal   ___________________________________________________________________________________________________________________________________     Reason for Visit:  Chief Complaint   Patient presents with   • Anxiety       Subjective:  Shivam Ellis started Venlafaxine , now on two 37.5mg x 2 wks, at first was wheezy otherwise fine. Stated she does not get paranoid as much anymore. Although , Shivam Ellis has anxiety, low energy, frustration then discussed difficulties since dog  and hard to replace. Anxiety at night- cause sister dogs barking a lot and jumping on table, Shivam Ellis want place of her own due to enivornment . there finanical stressors as the barriers.        ROS:  Depression: not as depressed as she was Anxiety: defer to above  Irritability: anger, aggrevations , not sure if for the right reason or if cause of anxiety  Sleep: stable   Energy: defer to above  OCD- not as much as it was, Renato Hogan reported on improvement , less driving around streets or rechecking things   SI/HI  :  Denies  Psychoisis- +VH  And hearing prior dog barking  Milvia- not recently , h/o  x3 - " cause of impulsiveness , poor judgement"     Medication: Lauren Estrella is  Compliant to Venlafaxine Seroquel, Lamotrigine and Luvox   Medication s/e- denies    Substance Hx:  Tobacco- former         Medical ROS:   Denies headache, dizziness, muslce tension  Difficults walking up stair- cause of arthritis                     Pertinent items are noted in above, all other symptoms are negative       Mental Status Evaluation:  General Appearance:  Lauren Estrella is a 66 y.o.  female casually dressed, adequate hygiene and grooming, looks stated age, Wearing glasses    Behavior:  pleasant, cooperative, intermittent eye contact, restless and fidgety   Speech:  WNL, rhythm, volume, latency, amount. Talkative    Mood:  Anxious> depression   Affect:  normal   Thought Process:  normal and logical   Thought Content:  no overt delusions, ruminations, reports of hallucinations   Perceptual Disturbances: Does not appear responding or preoccupied   Delusions  w/o   Risk Potential: Suicidal Ideations w/o  Homicidal Ideations w/o  Potential for Aggression No     Sensorium:  Oriented to person, place, time/date and situation [employer], month August, year 2023)    Memory:  recent and remote memory grossly intact   Consciousness:  alert and awake   Attention: attention span and concentration are age appropriate   Insight:  fair   Judgment: Appropriate    Gait/Station: normal   Motor Activity: no abnormal movements     There were no vitals filed for this visit.      Medications:   Current Outpatient Medications on File Prior to Visit Medication Sig Dispense Refill   • acetaminophen (TYLENOL) 650 mg CR tablet Take 1 tablet (650 mg total) by mouth every 8 (eight) hours as needed for mild pain (Patient taking differently: Take 500 mg by mouth every 8 (eight) hours as needed for mild pain) 30 tablet 0   • albuterol (Ventolin HFA) 90 mcg/act inhaler Inhale 2 puffs every 6 (six) hours as needed for wheezing or shortness of breath 8.5 g 3   • AMILoride 5 mg tablet Take 1 tablet (5 mg total) by mouth 2 (two) times a day 180 tablet 3   • amLODIPine (NORVASC) 5 mg tablet Take 1 tablet (5 mg total) by mouth daily 90 tablet 3   • aspirin 81 mg chewable tablet Chew 1 tablet (81 mg total) daily     • budesonide-formoterol (Symbicort) 160-4.5 mcg/act inhaler Inhale 2 puffs 2 (two) times a day Rinse mouth after use.  10.2 g 11   • carvedilol (COREG) 25 mg tablet TAKE 1 TABLET BY MOUTH  TWICE DAILY WITH MEALS 180 tablet 3   • cholecalciferol (VITAMIN D3) 1,000 units tablet Take 5 tablets (5,000 Units total) by mouth daily 90 tablet 5   • clonazePAM (KlonoPIN) 0.5 mg tablet Take 1 tablet (0.5 mg total) by mouth 3 (three) times a day 270 tablet 0   • fluvoxaMINE (LUVOX) 100 mg tablet Fluvoxamine 100m tablet in the morning ; 2 tablets at night 270 tablet 1   • lamoTRIgine (LaMICtal) 200 MG tablet Lamotrigine 200m tablet in the morning and 1 tablet at night 60 tablet 1   • omeprazole (PriLOSEC) 40 MG capsule Take 1 capsule (40 mg total) by mouth daily before breakfast 90 capsule 3   • ondansetron (ZOFRAN) 4 mg tablet Take 1 tablet (4 mg total) by mouth every 8 (eight) hours as needed for nausea or vomiting 60 tablet 1   • QUEtiapine (SEROquel) 100 mg tablet Quetiapine 100mg : 1 tablet + 400mg po at night 90 tablet 0   • QUEtiapine (SEROquel) 300 mg tablet Quetiapine 300m tablet po  in morning 90 tablet 0   • QUEtiapine (SEROquel) 400 MG tablet Quetiapine 400m tablet + 100mg tablet po at night 90 tablet 0   • rosuvastatin (CRESTOR) 20 MG tablet TAKE 1 TABLET BY MOUTH IN  THE EVENING 90 tablet 3   • traMADol (Ultram) 50 mg tablet Take 1 tablet (50 mg total) by mouth every 12 (twelve) hours as needed for moderate pain 60 tablet 0   • venlafaxine (EFFEXOR-XR) 37.5 mg 24 hr capsule Venlafaxine ER 37.5mg : 1 capsule daily in the morning x 2wk, then 2 capsules per morning 60 capsule 1     No current facility-administered medications on file prior to visit. Labs: I have personally reviewed all pertinent laboratory/tests results.    Most Recent Labs:     Hospital Outpatient Visit on 09/08/2023   Component Date Value Ref Range Status   • AV area peak jhonny 09/08/2023 1.2  cm2 Final   • LA size 09/08/2023 4.3  cm Final   • Aortic valve mean velocity 09/08/2023 16.50  m/s Final   • LVPWd 09/08/2023 1.00  cm Final   • Left Atrium Area-systolic Apical T* 11/61/2212 21.1  cm2 Final   • Left Atrium Area-systolic Four Angelia* 03/92/0029 19.5  cm2 Final   • MV E' Tissue Velocity Septal 09/08/2023 8  cm/s Final   • MV E' Tissue Velocity Lateral 09/08/2023 12  cm/s Final   • RA 2D Volume 09/08/2023 34.0  mL Final   • Tricuspid annular plane systolic e* 51/66/6316 2.92  cm Final   • IVSd 09/08/2023 1.20  cm Final   • LV DIASTOLIC VOLUME (MOD BIPLANE) * 09/08/2023 60  mL Final   • LEFT VENTRICLE SYSTOLIC VOLUME (MO* 48/74/3719 27  mL Final   • Left ventricular stroke volume (2D) 09/08/2023 33.00  mL Final   • EF 09/08/2023 69  % Final   • A4C EF 09/08/2023 70  % Final   • LA length (A2C) 09/08/2023 5.40  cm Final   • LVIDd 09/08/2023 3.80  cm Final   • IVS 09/08/2023 1.2  cm Final   • LVIDS 09/08/2023 2.70  cm Final   • FS 09/08/2023 29  28 - 44 % Final   • LA volume (BP) 09/08/2023 67  mL Final   • Ao root 09/08/2023 3.40  cm Final   • RVID d 09/08/2023 3.8  cm Final   • LVOT mn grad 09/08/2023 3.0  mmHg Final   • AV area by cont VTI 09/08/2023 1.3  cm2 Final   • AV mean gradient 09/08/2023 14.0  mmHg Final   • AV LVOT peak gradient 09/08/2023 4  mmHg Final   • MV mean gradient antegrade 09/08/2023 2  mmHg Final   • MV valve area p 1/2 method 09/08/2023 5.50  cm2 Final   • E wave deceleration time 09/08/2023 138  ms Final   • LVOT diameter 09/08/2023 2.0  cm Final   • LVOT peak bruno 09/08/2023 1.04  m/s Final   • LVOT peak VTI 09/08/2023 23.7  cm Final   • Aortic valve peak velocity 09/08/2023 2.71  m/s Final   • Ao VTI 09/08/2023 60.0  cm Final   • LVOT stroke volume 09/08/2023 74.42  cm3 Final   • AV peak gradient 09/08/2023 29  mmHg Final   • MV peak gradient antegrade 09/08/2023 4  mmHg Final   • MV Peak E Bruno 09/08/2023 113  cm/s Final   • MV VTI 09/08/2023 25.38  cm Final   • MV Peak A Bruno 09/08/2023 0.67  m/s Final   • LV Systolic Volume (BP) 93/92/5823 44  mL Final   • LV Diastolic Volume (BP) 95/22/3374 141  mL Final   • RAA A4C 09/08/2023 13.9  cm2 Final   • MV stenosis pressure 1/2 time 09/08/2023 40  ms Final   • LVOT Cardiac Output 09/08/2023 4.99  l/min Final   • LVOT stroke volume index 09/08/2023 34.80  ml/m2 Final   • LVOT Cardiac Index 09/08/2023 2.37  l/min/m2 Final   • LVSV, 2D 09/08/2023 33  mL Final   • LVOT area 09/08/2023 3.14  cm2 Final   • DVI 09/08/2023 0.38   Final   • AV valve area 09/08/2023 1.24  cm2 Final   • MV valve area by continuity eq 09/08/2023 2.93  cm2 Final   • LV EF 09/08/2023 65   Final   Admission on 08/27/2023, Discharged on 08/27/2023   Component Date Value Ref Range Status   • WBC 08/27/2023 7.56  4.31 - 10.16 Thousand/uL Final   • RBC 08/27/2023 3.71 (L)  3.81 - 5.12 Million/uL Final   • Hemoglobin 08/27/2023 11.5  11.5 - 15.4 g/dL Final   • Hematocrit 08/27/2023 37.4  34.8 - 46.1 % Final   • MCV 08/27/2023 101 (H)  82 - 98 fL Final   • MCH 08/27/2023 31.0  26.8 - 34.3 pg Final   • MCHC 08/27/2023 30.7 (L)  31.4 - 37.4 g/dL Final   • RDW 08/27/2023 13.0  11.6 - 15.1 % Final   • MPV 08/27/2023 10.0  8.9 - 12.7 fL Final   • Platelets 67/21/4341 283  149 - 390 Thousands/uL Final   • nRBC 08/27/2023 0  /100 WBCs Final   • Neutrophils Relative 08/27/2023 69  43 - 75 % Final   • Immat GRANS % 08/27/2023 0  0 - 2 % Final   • Lymphocytes Relative 08/27/2023 19  14 - 44 % Final   • Monocytes Relative 08/27/2023 8  4 - 12 % Final   • Eosinophils Relative 08/27/2023 3  0 - 6 % Final   • Basophils Relative 08/27/2023 1  0 - 1 % Final   • Neutrophils Absolute 08/27/2023 5.20  1.85 - 7.62 Thousands/µL Final   • Immature Grans Absolute 08/27/2023 0.03  0.00 - 0.20 Thousand/uL Final   • Lymphocytes Absolute 08/27/2023 1.46  0.60 - 4.47 Thousands/µL Final   • Monocytes Absolute 08/27/2023 0.59  0.17 - 1.22 Thousand/µL Final   • Eosinophils Absolute 08/27/2023 0.20  0.00 - 0.61 Thousand/µL Final   • Basophils Absolute 08/27/2023 0.08  0.00 - 0.10 Thousands/µL Final   • Sodium 08/27/2023 136  135 - 147 mmol/L Final   • Potassium 08/27/2023 4.8  3.5 - 5.3 mmol/L Final   • Chloride 08/27/2023 106  96 - 108 mmol/L Final   • CO2 08/27/2023 21  21 - 32 mmol/L Final   • ANION GAP 08/27/2023 9  mmol/L Final   • BUN 08/27/2023 44 (H)  5 - 25 mg/dL Final   • Creatinine 08/27/2023 2.51 (H)  0.60 - 1.30 mg/dL Final    Standardized to IDMS reference method   • Glucose 08/27/2023 95  65 - 140 mg/dL Final    If the patient is fasting, the ADA then defines impaired fasting glucose as > 100 mg/dL and diabetes as > or equal to 123 mg/dL. • Calcium 08/27/2023 9.7  8.4 - 10.2 mg/dL Final   • AST 08/27/2023 17  13 - 39 U/L Final   • ALT 08/27/2023 14  7 - 52 U/L Final    Specimen collection should occur prior to Sulfasalazine administration due to the potential for falsely depressed results. • Alkaline Phosphatase 08/27/2023 224 (H)  34 - 104 U/L Final   • Total Protein 08/27/2023 7.1  6.4 - 8.4 g/dL Final   • Albumin 08/27/2023 4.5  3.5 - 5.0 g/dL Final   • Total Bilirubin 08/27/2023 0.27  0.20 - 1.00 mg/dL Final    Use of this assay is not recommended for patients undergoing treatment with eltrombopag due to the potential for falsely elevated results.   N-acetyl-p-benzoquinone imine (metabolite of Acetaminophen) will generate erroneously low results in samples for patients that have taken an overdose of Acetaminophen. • eGFR 08/27/2023 20  ml/min/1.73sq m Final   • Lipase 08/27/2023 86 (H)  11 - 82 u/L Final   • Color, UA 08/27/2023 Light Yellow   Final   • Clarity, UA 08/27/2023 Clear   Final   • Specific Gravity, UA 08/27/2023 1.010  1.005 - 1.030 Final   • pH, UA 08/27/2023 6.0  4.5, 5.0, 5.5, 6.0, 6.5, 7.0, 7.5, 8.0 Final   • Leukocytes, UA 08/27/2023 Small (A)  Negative Final   • Nitrite, UA 08/27/2023 Negative  Negative Final   • Protein, UA 08/27/2023 Negative  Negative mg/dl Final   • Glucose, UA 08/27/2023 Negative  Negative mg/dl Final   • Ketones, UA 08/27/2023 Negative  Negative mg/dl Final   • Urobilinogen, UA 08/27/2023 <2.0  <2.0 mg/dl mg/dl Final   • Bilirubin, UA 08/27/2023 Negative  Negative Final   • Occult Blood, UA 08/27/2023 Negative  Negative Final   • RBC, UA 08/27/2023 None Seen  None Seen, 0-1, 1-2, 2-4, 0-5 /hpf Final   • WBC, UA 08/27/2023 1-2  None Seen, 0-1, 1-2, 0-5, 2-4 /hpf Final   • Epithelial Cells 08/27/2023 Occasional  None Seen, Occasional /hpf Final   • Bacteria, UA 08/27/2023 Occasional  None Seen, Occasional /hpf Final     Lipids WNL 08/2022  ________________________________________________________________________    A/P  Beauford Breeding y.o.  female, history of  Polysubstance abuse ( cocaine, marijuana), multiple hospitalizations, several suicide attempts, anorexia, OCD, bipolar disorder, presented w/ generalized anxiety and several neurovegetative symptoms. Interim events of percustory delusions and depression. D/C Aripiprazole - s/e "spacy". Case complicated as therecognitive deficits with , h/o recent head trauma. Currently with GARRET symptoms otherwise more stable since starting low dose Venlafaxine 75mg , w/o side effects.        DSM5  Generalized anxiety disorder  Major Depressive Disorder, recurrent w/ incongruent psychotic features  Unspecified Bipolar Disorder  Obsessive-compulsive disorder, compulsive         PLAN:    · History and external records reviewed. Discussed clinical findings and  diagnostic impression regarding anxiety  · Labs reviewed, 23 repeat CBC WNL, also CMP; except highBUN/CR;                                    23 ECHO ( EF 65%). · Increased Venlafaxine to 150mg   · Did not change other medications      Medications Prescribed During Encounter at [facility]:  -     venlafaxine (EFFEXOR-XR) 150 mg 24 hr capsule; Venlafaxine HCL ER 150m capsule po daily in the morning  -     lamoTRIgine (LaMICtal) 200 MG tablet; Lamotrigine 200m tablet in the morning and 1 tablet at night  -     QUEtiapine (SEROquel) 400 MG tablet; Quetiapine 400m tablet + 100mg tablet po at night  -     QUEtiapine (SEROquel) 300 mg tablet; Quetiapine 300m tablet po  in morning  -     QUEtiapine (SEROquel) 100 mg tablet; Quetiapine 100mg : 1 tablet + 400mg po at night        Treatement: Risks, benefits, and possible side effects of medications explained to patient and patient verbalizes understanding. Next Appointment at [facility]: 6wks    Today's Appointment@ [facility]  Face To Face:  9:07AM- 9:32AM ;Documentation Time:  11:30AM - 11:45AM    Encounter Duration: Time Spent 25 mins with Patient. Greater than 50% of total time was spent with the patient           MDM  Number of Diagnoses or Management Options  Bipolar affective disorder, current episode depressed, current episode severity unspecified ([address]): established, improving  Generalized anxiety disorder: established, worsening  Major depressive disorder, recurrent, severe with psychotic features ([address]): established, improving  Obsessive-compulsive disorder, unspecified type: established, improving     Amount and/or Complexity of Data Reviewed  Clinical lab tests: reviewed  Review and summarize past medical records: yes    Risk of Complications, Morbidity, and/or Mortality  Presenting problems: moderate  Diagnostic procedures: moderate  Management options: moderate        This note may have been written with the assistance of dictation software.  Please excuse any grammatical  errors, misspellings,  and abnormal spacing of letters , sentences or paragraphs

## 2023-09-08 NOTE — TELEPHONE ENCOUNTER
Lm for Ronaldo Priest that labs are stable at this time no changes are to be made. Sam Hinojosa recommends she have labs done again 1-2 weeks prior to appointment, which I mailed out .

## 2023-09-08 NOTE — TELEPHONE ENCOUNTER
Pt would like to be advised on her labwork. She stated that she was in the hospital and had labs done on 8/27 and would like to know if she still needs to get labs that were ordered by Dr Alyson Rendon or if the labs she competed on 8/27 are sufficient. Please review and advise pt.

## 2023-09-11 ENCOUNTER — SOCIAL WORK (OUTPATIENT)
Dept: BEHAVIORAL/MENTAL HEALTH CLINIC | Facility: CLINIC | Age: 63
End: 2023-09-11
Payer: MEDICARE

## 2023-09-11 DIAGNOSIS — F31.4 SEVERE DEPRESSED BIPOLAR I DISORDER WITHOUT PSYCHOTIC FEATURES (HCC): ICD-10-CM

## 2023-09-11 DIAGNOSIS — F42.2 MIXED OBSESSIONAL THOUGHTS AND ACTS: Primary | ICD-10-CM

## 2023-09-11 PROCEDURE — 90834 PSYTX W PT 45 MINUTES: CPT | Performed by: COUNSELOR

## 2023-09-11 NOTE — BH TREATMENT PLAN
Outpatient [facility]  1960     Date of Initial Psychotherapy Assessment: Many years ago   Date of Current Treatment Plan: 09/11/23  Treatment Plan Target Date: 3-11-24  Treatment Plan Expiration Date: 3-11-24    Diagnosis:   No diagnosis found. Area(s) of Need: Bipolar, depression, OCD and anxiety    Long Term Goal 1 (in the client's own words): Continue to work on OCD     Stage of Change: Action    Target Date for completion: TBD     Anticipated therapeutic modalities: CBT, Mindfulness     People identified to complete this goal: Client      Objective 1: (identify the means of measuring success in meeting the objective): I will not be spending obsessively      Objective 2: (identify the means of measuring success in meeting the objective): N/A      Long Term Goal 2 (in the client's own words): Better control over my anxiety    Stage of Change: Action    Target Date for completion: TBD     Anticipated therapeutic modalities: Mindfulness, CBT     People identified to complete this goal: Client       Objective 1: (identify the means of measuring success in meeting the objective): I will not feel so paranoid if I am less anxious      Objective 2: (identify the means of measuring success in meeting the objective): N/A     Long Term Goal 3 (in the client's own words): I want my privacy back to help with my mood    Stage of Change: Preparation    Target Date for completion: TBD     Anticipated therapeutic modalities: Problem solving     People identified to complete this goal: Client      Objective 1: (identify the means of measuring success in meeting the objective): I will be able to go back upstairs to my own bed and my own room.       Objective 2: (identify the means of measuring success in meeting the objective): N/A     I am currently under the care of a . [place]'s psychiatric provider: yes    My [place] psychiatric provider is: Dr. Jessica Jules    I am currently taking psychiatric medications: Yes, as prescribed    I feel that I will be ready for discharge from mental health care when I reach the following (measurable goal/objective): When I reach my goals    For children and adults who have a legal guardian:   Has there been any change to custody orders and/or guardianship status? NA. If yes, attach updated documentation. I have created my Crisis Plan and have been offered a copy of this plan    [facility]: Diagnosis and Treatment Plan explained to [facility] acknowledges an understanding of their diagnosis. Jorge L Urbina agrees to this treatment plan. I have been offered a copy of this Treatment Plan.  no

## 2023-09-11 NOTE — PSYCH
Behavioral Health Psychotherapy Progress Note    Psychotherapy Provided: Individual Psychotherapy     1. Mixed obsessional thoughts and acts        2. Severe depressed bipolar I disorder without psychotic features ([address])            Goals addressed in session: Goal 1     DATA: Updated goals today with client. She said she would like to continue to work on the anxiety she feels and be able to control OCD. She talked today about living with her sister and niece and how difficult this can be for her. She also said she misses her dog who she had to put done in August.  She also talked about her sons and how she wishes they would talk to her and she would be a part of their lives. During this session, this clinician used the following therapeutic modalities: Client-centered Therapy    Substance Abuse was not addressed during this session. If the client is diagnosed with a co-occurring substance use disorder, please indicate any changes in the frequency or amount of use: N/A. Stage of change for addressing substance use diagnoses: No substance use/Not applicable    ASSESSMENT:  Maddie Saldana presents with a Euthymic/ normal and Depressed mood. her affect is Normal range and intensity, which is congruent, with her mood and the content of the session. The client has not made progress on their goals. Maddie Saldana presents with a none risk of suicide, none risk of self-harm, and none risk of harm to others. For any risk assessment that surpasses a "low" rating, a safety plan must be developed. A safety plan was indicated: no  If yes, describe in detail N/A    PLAN: Between sessions, Maddie Saldana will practice good self care. At the next session, the therapist will use Client-centered Therapy to address goals/needs/concerns. Behavioral Health Treatment Plan and Discharge Planning: Maddie Saldana is aware of and agrees to continue to work on their treatment plan.  They have identified and are working toward their discharge goals.  yes    Visit start and stop times:    09/11/23  Start Time: 1000  Stop Time: 1050  Total Visit Time: 50 minutes

## 2023-09-12 ENCOUNTER — APPOINTMENT (OUTPATIENT)
Dept: PHYSICAL THERAPY | Facility: CLINIC | Age: 63
End: 2023-09-12
Payer: COMMERCIAL

## 2023-09-12 DIAGNOSIS — I35.0 MODERATE AORTIC STENOSIS: Primary | ICD-10-CM

## 2023-09-14 ENCOUNTER — OFFICE VISIT (OUTPATIENT)
Dept: PSYCHIATRY | Facility: CLINIC | Age: 63
End: 2023-09-14
Payer: MEDICARE

## 2023-09-14 DIAGNOSIS — F31.30 BIPOLAR AFFECTIVE DISORDER, CURRENT EPISODE DEPRESSED, CURRENT EPISODE SEVERITY UNSPECIFIED (HCC): ICD-10-CM

## 2023-09-14 DIAGNOSIS — F33.3 MAJOR DEPRESSIVE DISORDER, RECURRENT, SEVERE WITH PSYCHOTIC FEATURES (HCC): ICD-10-CM

## 2023-09-14 DIAGNOSIS — F42.9 OBSESSIVE-COMPULSIVE DISORDER, UNSPECIFIED TYPE: ICD-10-CM

## 2023-09-14 DIAGNOSIS — F41.1 GENERALIZED ANXIETY DISORDER: Primary | ICD-10-CM

## 2023-09-14 PROCEDURE — 99213 OFFICE O/P EST LOW 20 MIN: CPT | Performed by: PSYCHIATRY & NEUROLOGY

## 2023-09-14 RX ORDER — QUETIAPINE FUMARATE 300 MG/1
TABLET, FILM COATED ORAL
Qty: 30 TABLET | Refills: 1 | Status: SHIPPED | OUTPATIENT
Start: 2023-09-14

## 2023-09-14 RX ORDER — QUETIAPINE FUMARATE 100 MG/1
TABLET, FILM COATED ORAL
Qty: 30 TABLET | Refills: 1 | Status: SHIPPED | OUTPATIENT
Start: 2023-09-14

## 2023-09-14 RX ORDER — QUETIAPINE FUMARATE 400 MG/1
TABLET, FILM COATED ORAL
Qty: 30 TABLET | Refills: 1 | Status: SHIPPED | OUTPATIENT
Start: 2023-09-14

## 2023-09-14 RX ORDER — LAMOTRIGINE 200 MG/1
TABLET ORAL
Qty: 60 TABLET | Refills: 1 | Status: SHIPPED | OUTPATIENT
Start: 2023-09-14

## 2023-09-14 RX ORDER — VENLAFAXINE HYDROCHLORIDE 150 MG/1
CAPSULE, EXTENDED RELEASE ORAL
Qty: 30 CAPSULE | Refills: 1 | Status: SHIPPED | OUTPATIENT
Start: 2023-09-14

## 2023-09-18 ENCOUNTER — TELEPHONE (OUTPATIENT)
Dept: FAMILY MEDICINE CLINIC | Facility: HOSPITAL | Age: 63
End: 2023-09-18

## 2023-09-18 NOTE — TELEPHONE ENCOUNTER
I returned pt call, I do not see that any one tried to reach her. Pt states she is not waiting for any test results.  CR

## 2023-09-19 ENCOUNTER — OFFICE VISIT (OUTPATIENT)
Dept: PHYSICAL THERAPY | Facility: CLINIC | Age: 63
End: 2023-09-19
Payer: COMMERCIAL

## 2023-09-19 DIAGNOSIS — M17.11 PRIMARY OSTEOARTHRITIS OF RIGHT KNEE: ICD-10-CM

## 2023-09-19 DIAGNOSIS — M17.12 PRIMARY OSTEOARTHRITIS OF LEFT KNEE: Primary | ICD-10-CM

## 2023-09-19 PROCEDURE — 97112 NEUROMUSCULAR REEDUCATION: CPT | Performed by: PHYSICAL THERAPIST

## 2023-09-19 PROCEDURE — 97110 THERAPEUTIC EXERCISES: CPT | Performed by: PHYSICAL THERAPIST

## 2023-09-19 PROCEDURE — 97530 THERAPEUTIC ACTIVITIES: CPT | Performed by: PHYSICAL THERAPIST

## 2023-09-19 NOTE — PROGRESS NOTES
Daily Note     Today's date: 2023  Patient name: Jazmin Wells  : 1960  MRN: [id number]  Referring provider: Rony Rodriguez PA-C  Dx:   Encounter Diagnosis     ICD-10-CM    1. Primary osteoarthritis of left knee  M17.12       2. Primary osteoarthritis of right knee  M17.11         Subjective: Had some housing issues and was unable to attend PT for 2 visits, knees in pain today      Objective: See treatment diary below    Assessment: Tolerated treatment well with progression of treatment program as noted below requiring verbal and tactile cues from PT for safe execution of therapeutic exercise. Patient demonstrated fatigue post treatment, exhibited good technique with therapeutic exercises and would benefit from continued PT    Plan: Progress treatment as tolerated.        EPOC: 10/18/23    Manuals             L Knee PROM             Gentle L tibiofemoral mobs                                       Neuro Re-Ed             L quad set             HS curl w/swiss ball x25            SLR iso x25 3" ea            SAQ iso 3lb x30            LAQ iso 3lb x30            Reverse squat slant x25 5"                         Ther Ex             HR x25 airex            TR x25 airex             Slant board 10x10"            Clam shell supine x25 Peach TB             Leg Press B/l 85lbs x25  SL 55lb x25 ea                                      L Knee TERT with heat Into EXT 6'            Ther Activity             LE Bike for improved L knee ROM 6' L3            Pt education: pathoanatomy, nature of sxs, POC, HEP 2' NS            Gait Training                                       Modalities

## 2023-09-21 ENCOUNTER — APPOINTMENT (OUTPATIENT)
Dept: PHYSICAL THERAPY | Facility: CLINIC | Age: 63
End: 2023-09-21
Payer: COMMERCIAL

## 2023-09-26 ENCOUNTER — APPOINTMENT (OUTPATIENT)
Dept: PHYSICAL THERAPY | Facility: CLINIC | Age: 63
End: 2023-09-26
Payer: COMMERCIAL

## 2023-09-28 ENCOUNTER — APPOINTMENT (OUTPATIENT)
Dept: PHYSICAL THERAPY | Facility: CLINIC | Age: 63
End: 2023-09-28
Payer: COMMERCIAL

## 2023-09-28 DIAGNOSIS — E23.2 DIABETES INSIPIDUS (HCC): ICD-10-CM

## 2023-09-28 DIAGNOSIS — N18.30 CHRONIC KIDNEY DISEASE, STAGE 3 (HCC): ICD-10-CM

## 2023-09-28 DIAGNOSIS — M79.604 BILATERAL LEG PAIN: ICD-10-CM

## 2023-09-28 DIAGNOSIS — M79.605 BILATERAL LEG PAIN: ICD-10-CM

## 2023-09-28 DIAGNOSIS — N28.1 RENAL CYST: ICD-10-CM

## 2023-09-28 DIAGNOSIS — M54.50 LUMBAR PAIN: ICD-10-CM

## 2023-09-28 DIAGNOSIS — N25.81 SECONDARY RENAL HYPERPARATHYROIDISM (HCC): ICD-10-CM

## 2023-09-28 DIAGNOSIS — I10 ESSENTIAL HYPERTENSION: ICD-10-CM

## 2023-09-29 RX ORDER — TRAMADOL HYDROCHLORIDE 50 MG/1
50 TABLET ORAL EVERY 12 HOURS PRN
Qty: 60 TABLET | Refills: 0 | Status: SHIPPED | OUTPATIENT
Start: 2023-09-29

## 2023-09-29 RX ORDER — CARVEDILOL 25 MG/1
25 TABLET ORAL 2 TIMES DAILY WITH MEALS
Qty: 180 TABLET | Refills: 3 | Status: SHIPPED | OUTPATIENT
Start: 2023-09-29

## 2023-10-06 DIAGNOSIS — N18.30 CHRONIC KIDNEY DISEASE, STAGE 3 (HCC): ICD-10-CM

## 2023-10-06 DIAGNOSIS — N25.81 SECONDARY RENAL HYPERPARATHYROIDISM (HCC): ICD-10-CM

## 2023-10-06 DIAGNOSIS — I10 ESSENTIAL HYPERTENSION: ICD-10-CM

## 2023-10-06 DIAGNOSIS — E23.2 DIABETES INSIPIDUS (HCC): ICD-10-CM

## 2023-10-06 DIAGNOSIS — N28.1 RENAL CYST: ICD-10-CM

## 2023-10-06 RX ORDER — CARVEDILOL 25 MG/1
25 TABLET ORAL 2 TIMES DAILY WITH MEALS
Qty: 180 TABLET | Refills: 3 | OUTPATIENT
Start: 2023-10-06

## 2023-10-11 DIAGNOSIS — N28.1 RENAL CYST: ICD-10-CM

## 2023-10-11 DIAGNOSIS — N18.30 CHRONIC KIDNEY DISEASE, STAGE 3 (HCC): ICD-10-CM

## 2023-10-11 DIAGNOSIS — E23.2 DIABETES INSIPIDUS (HCC): ICD-10-CM

## 2023-10-11 DIAGNOSIS — I10 ESSENTIAL HYPERTENSION: ICD-10-CM

## 2023-10-11 DIAGNOSIS — N25.81 SECONDARY RENAL HYPERPARATHYROIDISM (HCC): ICD-10-CM

## 2023-10-11 RX ORDER — CARVEDILOL 25 MG/1
25 TABLET ORAL 2 TIMES DAILY WITH MEALS
Qty: 180 TABLET | Refills: 3 | OUTPATIENT
Start: 2023-10-11

## 2023-10-13 ENCOUNTER — OFFICE VISIT (OUTPATIENT)
Dept: CARDIOLOGY CLINIC | Facility: CLINIC | Age: 63
End: 2023-10-13
Payer: MEDICARE

## 2023-10-13 VITALS
HEART RATE: 84 BPM | WEIGHT: 220 LBS | BODY MASS INDEX: 34.53 KG/M2 | DIASTOLIC BLOOD PRESSURE: 68 MMHG | SYSTOLIC BLOOD PRESSURE: 134 MMHG | HEIGHT: 67 IN

## 2023-10-13 DIAGNOSIS — I10 ESSENTIAL HYPERTENSION: Primary | ICD-10-CM

## 2023-10-13 DIAGNOSIS — I35.0 NONRHEUMATIC AORTIC VALVE STENOSIS: ICD-10-CM

## 2023-10-13 PROCEDURE — 99214 OFFICE O/P EST MOD 30 MIN: CPT | Performed by: INTERNAL MEDICINE

## 2023-10-13 NOTE — PROGRESS NOTES
Cardiology Follow Up    Azael Given  1960  [id number]  [facility] Piedad Silverio  [address] [place] [address] [address] [address] [phone]    1. Essential hypertension        2. Nonrheumatic aortic valve stenosis            Interval History: Followup HTN, aortic stenosis. No chest pain. Stable dyspnea. No palpitations. Medical Problems       Problem List       Hypercholesteremia (Chronic)    Overview Signed 3/20/2018  9:51 AM by Merritt Moody DO     Dx age 50         Spondylolisthesis at L5-S1 level    Renal cyst    Vitamin D deficiency    Secondary renal hyperparathyroidism ([address])    Herniated nucleus pulposus, L5-S1    Idiopathic chronic pancreatitis ([address])    Primary osteoarthritis of right knee    Overview Signed 3/20/2018  9:45 AM by Merritt Moody DO     Work injury 2013 approx- seen [facility]         Age-related osteoporosis without current pathological fracture    Overview Addendum 1/2/2019 10:40 AM by Merritt Moody DO     Had left ankle fracture age 50   clavicle fracture as child- skateboard   had dexascan dec 2017         Cardiac murmur    Overview Signed 1/2/2019 10:39 AM by Merritt Moody DO     Echo done 6/2018- mild pulmonary valve regurg and normal lv ej fraction- grade 1 diastolic dysfunction noted         Rectal prolapse    Overview Signed 7/30/2018 10:31 AM by Merritt Moody DO     Surgery done July 2018- Dr. Zuly Sanchez         Elevated LFTs    Primary hypertension    Hyperprolactinemia [facility])    Overview Signed 1/2/2019 10:36 AM by Merritt Moody DO     Was dx in approx in her 31's with amenorrhea- had pitiutary ?  Cyst- last mri over 10 years ago in [address] 1 disorder, depressed, severe ([address])    Overview Signed 1/23/2019  9:13 AM by Merritt Moody DO     Follows with [facility]         Obsessive compulsive disorder    Overview Signed 1/23/2019  9:13 AM by Merritt Moody DO     From [facility] notes 11/2018 Panic disorder with agoraphobia    Overview Signed 1/23/2019  9:14 AM by Yissel Dumont DO     From [facility] notes11/2018         Eating disorder    Overview Signed 1/23/2019  9:14 AM by Yissel Dumont DO     From [facility] notes 11/2018         Hyperparathyroidism [facility])    Benign neoplasm of colon    Drug-induced constipation    Infarction of left basal ganglia (HCC)    Overview Signed 5/1/2019  9:54 AM by Yissel Dumont DO     found on  Mri 4/2019         Abnormal chest CT    Hyperphosphatemia    Abnormal thyroid exam    Thyroid nodule    Moderate persistent asthma without complication    Primary osteoarthritis of left knee    Steal syndrome dialysis vascular access [facility])    AVF (arteriovenous fistula) ([facility])    Right patellofemoral syndrome    CKD (chronic kidney disease) stage 4, GFR 15-29 ml/min ([facility])    Lab Results   Component Value Date    EGFR 20 08/27/2023    EGFR 19 07/28/2023    EGFR 19 07/13/2023    CREATININE 2.51 (H) 08/27/2023    CREATININE 2.60 (H) 07/28/2023    CREATININE 2.62 (H) 07/13/2023         Bilateral sacroiliitis (HCC)    Hallux valgus, right    Acquired hallux interphalangeus of right foot    Effusion of right knee    Left knee tendonitis    Family history of cardiac disorder in father    Anxiety    Claw toe, acquired, right    Injury of plantar plate, right, initial encounter    Acquired hallux interphalangeus, right    Closed Colles' fracture of right radius    Aftercare following surgery of the musculoskeletal system    Acute shoulder pain    Essential hypertension    Nausea    GERD (gastroesophageal reflux disease)    End stage renal disease (HCC)    Lab Results   Component Value Date    EGFR 20 08/27/2023    EGFR 19 07/28/2023    EGFR 19 07/13/2023    CREATININE 2.51 (H) 08/27/2023    CREATININE 2.60 (H) 07/28/2023    CREATININE 2.62 (H) 07/13/2023         Injury of right thumb    Pain of right upper extremity    Continuous opioid dependence (HCC)    Severe depressed bipolar I disorder without psychotic features ([address])    Mixed obsessional thoughts and acts    Eating disorder, unspecified    Chronic back pain (Chronic)    Obesity, morbid (HCC)    Pes anserinus bursitis of both knees    Umbilical hernia    Shortness of breath    Leg swelling    Obesity (BMI 30-39. 9)    Falls    Pulmonary embolism with infarction ([address])    ILD (interstitial lung disease) ([address])    Statin intolerance    Moderate aortic stenosis        Past Medical History:   Diagnosis Date    Anxiety     Anxiety disorder     Arthritis     At risk for falls     Bipolar 2 disorder (HCC)     Chronic back pain     Chronic kidney disease     Closed fracture of distal end of right fibula with routine healing 11/04/2020    COVID-19     in Jan 2021    CVA (cerebral vascular accident) ([address])     noted on MRI in the past    Depression     GERD (gastroesophageal reflux disease)     Hypercholesteremia     Hypernatremia     Hypertension     Hypokalemia     Idiopathic chronic pancreatitis ([address]) 03/20/2018    Intervertebral disc disorder with radiculopathy of lumbosacral region     resolved: 12/28/2015    Kidney disease     Kidney disease     Limb alert care status     LUE-fistula    Panic attacks     Pericardial effusion     PONV (postoperative nausea and vomiting)     Psychiatric problem     Radiculitis     resolved: 12/28/2015    Secondary renal hyperparathyroidism ([address])     Stroke ([address])     Vitamin D deficiency      Social History     Socioeconomic History    Marital status:      Spouse name: Not on file    Number of children: Not on file    Years of education: Not on file    Highest education level: Not on file   Occupational History    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   Tobacco Use    Smoking status: Never    Smokeless tobacco: Never   Vaping Use    Vaping Use: Never used   Substance and Sexual Activity    Alcohol use: Never    Drug use: Not Currently    Sexual activity: Not Currently   Other Topics Concern    Not on file   Social History Narrative    Daily caffeine consumption 2-3 servings a day    Lives with family. No living will. Has dentures---no dental care. Primary language--English. Feels safe at home. Social Determinants of Health     Financial Resource Strain: Medium Risk (12/7/2022)    Overall Financial Resource Strain (CARDIA)     Difficulty of Paying Living Expenses: Somewhat hard   Food Insecurity: No Food Insecurity (7/12/2023)    Hunger Vital Sign     Worried About Running Out of Food in the Last Year: Never true     Ran Out of Food in the Last Year: Never true   Transportation Needs: No Transportation Needs (7/12/2023)    PRAPARE - Transportation     Lack of Transportation (Medical): No     Lack of Transportation (Non-Medical): No   Physical Activity: Inactive (4/19/2021)    Exercise Vital Sign     Days of Exercise per Week: 0 days     Minutes of Exercise per Session: 0 min   Stress: Stress Concern Present (4/19/2021)    [facility]     Feeling of Stress :  To some extent   Social Connections: Not on file   Intimate Partner Violence: Not At Risk (4/19/2021)    Humiliation, Afraid, Rape, and Kick questionnaire     Fear of Current or Ex-Partner: No     Emotionally Abused: No     Physically Abused: No     Sexually Abused: No   Housing Stability: Low Risk  (7/12/2023)    Housing Stability Vital Sign     Unable to Pay for Housing in the Last Year: No     Number of Places Lived in the Last Year: 1     Unstable Housing in the Last Year: No      Family History   Problem Relation Age of Onset    Bipolar disorder Mother     Mental illness Mother         depression    Stroke Mother     Dementia Mother     Colon polyps Mother     Heart disease Father     Hypertension Father     Diabetes Father     Other Family         Back disorder    Diabetes Family     Heart disease Family     Hypertension Family     Stroke Family     Thyroid disease Family     Breast cancer Paternal Grandmother         age unknown    Breast cancer Paternal Aunt         age unknown    Breast cancer Maternal Aunt         age unknown    Mental illness Sister     Colon polyps Sister     Mental illness Sister     Heart disease Sister     No Known Problems Sister     Breast cancer Sister 76    Other Son         pituitary tumor    Hypertension Son     Obesity Son     No Known Problems Son     No Known Problems Maternal Grandmother     No Known Problems Maternal Grandfather     No Known Problems Paternal Grandfather     Breast cancer Paternal Aunt         age unknown    Substance Abuse Neg Hx         neg fam hx    Colon cancer Neg Hx      Past Surgical History:   Procedure Laterality Date    BUNIONECTOMY      Left foot     CAST APPLICATION Right     Procedure: Application short-arm thumb spica splint;  Surgeon: Joelle Rosas MD;  Location: [facility];  Service: Orthopedics    COLON SURGERY      COLONOSCOPY  2021    DILATION AND CURETTAGE OF UTERUS      INDUCED       surgically induced    NY ARTERIOVENOUS ANASTOMOSIS OPEN DIRECT Left 2019    Procedure: CREATION FISTULA ARTERIOVENOUS (AV) left wrists possible left upper;  Surgeon: Jad Garcia MD;  Location: [facility];  Service: Vascular    NY Hunterrt W/SESMDC W/DIST Tye Buoy Left 2019    Procedure: Praful Harris;  Surgeon: Gokul Dixon DPM;  Location: [facility];  Service: Podiatry    NY [place] W/SESMDC W/DIST Tye Buoy Right 8/3/2020    Procedure: Marylene Jabs;  Surgeon: Gokul Dixon DPM;  Location: [facility];  Service: Podiatry    NY CORRJ HALLUX VALGUS W/SESMDC [place]arr Ricardo PHLNX OSTEOT Right 2021    Procedure: Netta Walter, right tameka osteotomy and 2nd claw toe correction;  Surgeon: Josselin Castillo MD;  Location: [facility];  Service: Orthopedics    NY ERCP DX COLLECTION SPECIMEN BRUSHING/WASHING N/A 2018    Procedure: ENDOSCOPIC RETROGRADE CHOLANGIOPANCREATOGRAPHY (ERCP); Surgeon: Chantal Melgar MD;  Location: [facility];  Service: Gastroenterology    ID LAPAROSCOPY PROCTOPEXY PROLAPSE N/A 7/13/2018    Procedure: ROBOTIC SIGMOID RESECTION / RECTOPEXY;  Surgeon: Isiah Hatchet, MD;  Location: [facility];  Service: Colorectal    ID OPEN TREATMENT RADIAL SHAFT FRACTURE Right 10/11/2021    Procedure: OPEN REDUCTION W/ INTERNAL FIXATION (ORIF) RADIUS (WRIST), RIGHT DISTAL;  Surgeon: Wesley Aviles MD;  Location: [facility];  Service: Orthopedics    ID REMOVAL IMPLANT DEEP Right 6/23/2022    Procedure: Removal of hardware volar aspect right distal radius (distal radial plate and screws);   Surgeon: Amanda Page MD;  Location: [facility];  Service: Orthopedics    ID SIGMOIDOSCOPY FLX DX W/COLLJ SPEC BR/WA IF PFRMD N/A 7/13/2018    Procedure: SIGMOIDOSCOPY FLEXIBLE;  Surgeon: Isiah Hatchet, MD;  [facility];  Service: Colorectal    ID TR TDN RESTORE INTRNSC FUNCJ ALL 4 FNGRS Right 6/23/2022    Procedure: Right ring finger flexor digitorum superficialis to flexor pollicis longus tendon transfer;  Surgeon: Amanda Page MD;  Location: [facility];  Service: Orthopedics    TUBAL LIGATION Bilateral 1997    US GUIDED THYROID BIOPSY  7/30/2019       Current Outpatient Medications:     acetaminophen (TYLENOL) 650 mg CR tablet, Take 1 tablet (650 mg total) by mouth every 8 (eight) hours as needed for mild pain (Patient taking differently: Take 500 mg by mouth every 8 (eight) hours as needed for mild pain), Disp: 30 tablet, Rfl: 0    albuterol (Ventolin HFA) 90 mcg/act inhaler, Inhale 2 puffs every 6 (six) hours as needed for wheezing or shortness of breath, Disp: 8.5 g, Rfl: 3    AMILoride 5 mg tablet, Take 1 tablet (5 mg total) by mouth 2 (two) times a day, Disp: 180 tablet, Rfl: 3    amLODIPine (NORVASC) 5 mg tablet, Take 1 tablet (5 mg total) by mouth daily, Disp: 90 tablet, Rfl: 3    aspirin 81 mg chewable tablet, Chew 1 tablet (81 mg total) daily, Disp: , Rfl:     budesonide-formoterol (Symbicort) 160-4.5 mcg/act inhaler, Inhale 2 puffs 2 (two) times a day Rinse mouth after use., Disp: 10.2 g, Rfl: 11    carvedilol (COREG) 25 mg tablet, Take 1 tablet (25 mg total) by mouth 2 (two) times a day with meals, Disp: 180 tablet, Rfl: 3    cholecalciferol (VITAMIN D3) 1,000 units tablet, Take 5 tablets (5,000 Units total) by mouth daily, Disp: 90 tablet, Rfl: 5    clonazePAM (KlonoPIN) 0.5 mg tablet, Take 1 tablet (0.5 mg total) by mouth 3 (three) times a day, Disp: 270 tablet, Rfl: 0    fluvoxaMINE (LUVOX) 100 mg tablet, Fluvoxamine 100m tablet in the morning ; 2 tablets at night, Disp: 270 tablet, Rfl: 1    lamoTRIgine (LaMICtal) 200 MG tablet, Lamotrigine 200m tablet in the morning and 1 tablet at night, Disp: 60 tablet, Rfl: 1    omeprazole (PriLOSEC) 40 MG capsule, Take 1 capsule (40 mg total) by mouth daily before breakfast, Disp: 90 capsule, Rfl: 3    ondansetron (ZOFRAN) 4 mg tablet, Take 1 tablet (4 mg total) by mouth every 8 (eight) hours as needed for nausea or vomiting, Disp: 60 tablet, Rfl: 1    QUEtiapine (SEROquel) 100 mg tablet, Quetiapine 100mg : 1 tablet + 400mg po at night, Disp: 30 tablet, Rfl: 1    QUEtiapine (SEROquel) 300 mg tablet, Quetiapine 300m tablet po  in morning, Disp: 30 tablet, Rfl: 1    QUEtiapine (SEROquel) 400 MG tablet, Quetiapine 400m tablet + 100mg tablet po at night, Disp: 30 tablet, Rfl: 1    rosuvastatin (CRESTOR) 20 MG tablet, TAKE 1 TABLET BY MOUTH IN  THE EVENING, Disp: 90 tablet, Rfl: 3    traMADol (Ultram) 50 mg tablet, Take 1 tablet (50 mg total) by mouth every 12 (twelve) hours as needed for moderate pain, Disp: 60 tablet, Rfl: 0    venlafaxine (EFFEXOR-XR) 150 mg 24 hr capsule, Venlafaxine HCL ER 150m capsule po daily in the morning, Disp: 30 capsule, Rfl: 1  Allergies   Allergen Reactions    Molds & Smuts Nasal Congestion    Bee Pollen Nasal Congestion     Other reaction(s): Nasal Congestion    Pollen Extract Nasal Congestion       Labs:     Chemistry        Component Value Date/Time     02/27/2017 0758    K 4.8 08/27/2023 1607    K 4.7 02/27/2017 0758     08/27/2023 1607     02/27/2017 0758    CO2 21 08/27/2023 1607    CO2 28 02/27/2017 0758    BUN 44 (H) 08/27/2023 1607    BUN 36 (H) 02/27/2017 0758    CREATININE 2.51 (H) 08/27/2023 1607    CREATININE 1.86 (H) 02/27/2017 0758        Component Value Date/Time    CALCIUM 9.7 08/27/2023 1607    CALCIUM 9.6 02/27/2017 0758    CALCIUM 9.6 02/27/2017 0758    ALKPHOS 224 (H) 08/27/2023 1607    AST 17 08/27/2023 1607    ALT 14 08/27/2023 1607            No results found for: "CHOL"  Lab Results   Component Value Date    HDL 48 (L) 08/31/2022    HDL 66 07/30/2022    HDL 66 12/16/2021     Lab Results   Component Value Date    LDLCALC 143 (H) 08/31/2022    LDLCALC 145 (H) 07/30/2022    LDLCALC 132 (H) 12/16/2021     Lab Results   Component Value Date    TRIG 358 (H) 08/31/2022    TRIG 149 07/30/2022    TRIG 143 12/16/2021     No results found for: "CHOLHDL"    Imaging: No results found. EKG: .    Review of Systems   Constitutional: Negative. HENT: Negative. Eyes: Negative. Cardiovascular: Negative. Respiratory: Negative. Endocrine: Negative. Hematologic/Lymphatic: Negative. Skin: Negative. Musculoskeletal: Negative. Gastrointestinal: Negative. Genitourinary: Negative. Neurological: Negative. Psychiatric/Behavioral: Negative. Allergic/Immunologic: Negative. Vitals:    10/13/23 1043   BP: 134/68   Pulse: 84           Physical Exam  Vitals and nursing note reviewed. Constitutional:       Appearance: Normal appearance. HENT:      Head: Normocephalic. Nose: Nose normal.      Mouth/Throat:      Mouth: Mucous membranes are moist.   Eyes:      General: No scleral icterus.      Conjunctiva/sclera: Conjunctivae normal.   Cardiovascular:      Rate and Rhythm: Normal rate and regular rhythm. Heart sounds: Murmur heard. No gallop. Pulmonary:      Effort: Pulmonary effort is normal. No respiratory distress. Breath sounds: Normal breath sounds. No wheezing or rales. Abdominal:      General: Abdomen is flat. Bowel sounds are normal. There is no distension. Palpations: Abdomen is soft. Tenderness: There is no abdominal tenderness. There is no guarding. Musculoskeletal:      Cervical back: Normal range of motion and neck supple. Right lower leg: No edema. Left lower leg: No edema. Skin:     General: Skin is warm and dry. Neurological:      General: No focal deficit present. Mental Status: She is alert and oriented to person, place, and time. Psychiatric:         Mood and Affect: Mood normal.         Behavior: Behavior normal.         Discussion/Summary:    Hypertension: Bp is controlled. No changes today    Nuclear stress normal last year. Mild to moderate aortic stenosis: continue to monitor. Check next year. The patient was counseled regarding diagnostic results, instructions for management, risk factor reductions, impressions. total time of encounter was 25 minutes and 15 minutes was spent counseling.

## 2023-10-16 ENCOUNTER — SOCIAL WORK (OUTPATIENT)
Dept: BEHAVIORAL/MENTAL HEALTH CLINIC | Facility: CLINIC | Age: 63
End: 2023-10-16
Payer: MEDICARE

## 2023-10-16 DIAGNOSIS — F42.2 MIXED OBSESSIONAL THOUGHTS AND ACTS: ICD-10-CM

## 2023-10-16 DIAGNOSIS — F31.4 SEVERE DEPRESSED BIPOLAR I DISORDER WITHOUT PSYCHOTIC FEATURES (HCC): Primary | ICD-10-CM

## 2023-10-16 PROCEDURE — 90834 PSYTX W PT 45 MINUTES: CPT | Performed by: COUNSELOR

## 2023-10-16 NOTE — PSYCH
Behavioral Health Psychotherapy Progress Note    Psychotherapy Provided: Individual Psychotherapy     1. Severe depressed bipolar I disorder without psychotic features ([address])        2. Mixed obsessional thoughts and acts            Goals addressed in session: Goal 1     DATA: Gabi Suarez continues to talk about the upset and anguish she feels that her one son hasn't communicated with her for 3 years and her other son doesn't talk much to her. She said she misses her grandkids and having a relationship with her sons. Gabi Suarez became tearful talking about this and also tearful when talking about the loss of her beloved dog. This clinician tried to get Gabi Suarez to think of some things she can do for herself that would give her some meaning in her life and help her to feel purposeful. She lives with her sister and niece and doesn't feel very comfortable in the household because "of how they treat me."  During this session, this clinician used the following therapeutic modalities: Motivational Interviewing    Substance Abuse was not addressed during this session. If the client is diagnosed with a co-occurring substance use disorder, please indicate any changes in the frequency or amount of use: N/A. Stage of change for addressing substance use diagnoses: No substance use/Not applicable    ASSESSMENT:  Mat Valle presents with a depressed mood but full affect at times too.     her affect is Normal range and intensity, which is congruent, with her mood and the content of the session. The client has not made progress on their goals. Mat Valle presents with a none risk of suicide, none risk of self-harm, and none risk of harm to others. For any risk assessment that surpasses a "low" rating, a safety plan must be developed.     A safety plan was indicated: no  If yes, describe in detail N/A    PLAN: Between sessions, Mat Valle will try to think of some things she can do such as volunteer work to find a positive focus in her life. At the next session, the therapist will use Motivational Interviewing to address goals/needs/concerns. Behavioral Health Treatment Plan and Discharge Planning: Jazminjohnathan Wells is aware of and agrees to continue to work on their treatment plan. They have identified and are working toward their discharge goals.  yes    Visit start and stop times:    10/16/23  Start Time: 1000  Stop Time: 1052  Total Visit Time: 52 minutes done

## 2023-10-17 ENCOUNTER — TELEPHONE (OUTPATIENT)
Age: 63
End: 2023-10-17

## 2023-10-17 NOTE — TELEPHONE ENCOUNTER
Caller: Rudolph Miller    Doctor: Dr. Jung Graft    Reason for call: appt/pt not sure when seen last so checked chart/2022-scheduled    Call back#: NA

## 2023-10-18 ENCOUNTER — APPOINTMENT (OUTPATIENT)
Dept: LAB | Facility: HOSPITAL | Age: 63
End: 2023-10-18
Payer: MEDICARE

## 2023-10-18 DIAGNOSIS — N18.4 CKD (CHRONIC KIDNEY DISEASE) STAGE 4, GFR 15-29 ML/MIN (HCC): ICD-10-CM

## 2023-10-18 LAB
ANION GAP SERPL CALCULATED.3IONS-SCNC: 7 MMOL/L
BUN SERPL-MCNC: 27 MG/DL (ref 5–25)
CALCIUM SERPL-MCNC: 10.2 MG/DL (ref 8.4–10.2)
CHLORIDE SERPL-SCNC: 109 MMOL/L (ref 96–108)
CO2 SERPL-SCNC: 27 MMOL/L (ref 21–32)
CREAT SERPL-MCNC: 2.25 MG/DL (ref 0.6–1.3)
GFR SERPL CREATININE-BSD FRML MDRD: 22 ML/MIN/1.73SQ M
GLUCOSE P FAST SERPL-MCNC: 109 MG/DL (ref 65–99)
POTASSIUM SERPL-SCNC: 4.9 MMOL/L (ref 3.5–5.3)
SODIUM SERPL-SCNC: 143 MMOL/L (ref 135–147)

## 2023-10-18 PROCEDURE — 36415 COLL VENOUS BLD VENIPUNCTURE: CPT

## 2023-10-18 PROCEDURE — 80048 BASIC METABOLIC PNL TOTAL CA: CPT

## 2023-10-26 DIAGNOSIS — F31.81 BIPOLAR 2 DISORDER (HCC): Chronic | ICD-10-CM

## 2023-10-26 RX ORDER — CLONAZEPAM 0.5 MG/1
0.5 TABLET ORAL 3 TIMES DAILY
Qty: 270 TABLET | Refills: 0 | Status: SHIPPED | OUTPATIENT
Start: 2023-10-26 | End: 2024-01-24

## 2023-10-27 ENCOUNTER — OFFICE VISIT (OUTPATIENT)
Dept: NEPHROLOGY | Facility: HOSPITAL | Age: 63
End: 2023-10-27
Payer: MEDICARE

## 2023-10-27 VITALS
BODY MASS INDEX: 34.06 KG/M2 | DIASTOLIC BLOOD PRESSURE: 70 MMHG | SYSTOLIC BLOOD PRESSURE: 140 MMHG | HEIGHT: 67 IN | WEIGHT: 217 LBS | HEART RATE: 88 BPM

## 2023-10-27 DIAGNOSIS — N18.4 CKD (CHRONIC KIDNEY DISEASE) STAGE 4, GFR 15-29 ML/MIN (HCC): Primary | ICD-10-CM

## 2023-10-27 DIAGNOSIS — I10 ESSENTIAL HYPERTENSION: ICD-10-CM

## 2023-10-27 DIAGNOSIS — N25.81 SECONDARY RENAL HYPERPARATHYROIDISM (HCC): ICD-10-CM

## 2023-10-27 PROCEDURE — 99214 OFFICE O/P EST MOD 30 MIN: CPT | Performed by: INTERNAL MEDICINE

## 2023-10-27 NOTE — PATIENT INSTRUCTIONS
Chronic Kidney Disease   WHAT YOU NEED TO KNOW:   Chronic kidney disease (CKD) is the gradual and permanent loss of kidney function. It is also called chronic kidney failure, or chronic renal insufficiency. Normally, the kidneys remove fluid, chemicals, and waste from your blood. These wastes are turned into urine by your kidneys. CKD may worsen over time and lead to kidney failure. Your CKD team will help you and your family plan for your care at home. The team will help you create goals and find ways to meet your goals. Your care plan may change over time as your needs change. DISCHARGE INSTRUCTIONS:   Call your local emergency number (911 in the [address]) if:   You have a seizure. You have shortness of breath. Return to the emergency department if:   You are confused and very drowsy. Call your doctor or nephrologist if:   You suddenly gain or lose more weight than your healthcare provider has told you is okay. You have itchy skin or a rash. You urinate more or less than you normally do. You have blood in your urine. You have nausea and are vomiting. You have fatigue or muscle weakness. You have hiccups that will not stop. You have questions or concerns about your condition or care. Medicines:   Medicines  may be given to decrease blood pressure and get rid of extra fluid. You may also receive medicine to manage health conditions that may occur with CKD, such as anemia, diabetes, and heart disease. Take your medicine as directed. Contact your healthcare provider if you think your medicine is not helping or if you have side effects. Tell your provider if you are allergic to any medicine. Keep a list of the medicines, vitamins, and herbs you take. Include the amounts, and when and why you take them. Bring the list or the pill bottles to follow-up visits. Carry your medicine list with you in case of an emergency. What you can do to manage CKD:   Management may include making some lifestyle changes. Tell your healthcare provider if you have any concerns about being able to make changes. He or she can help you find solutions, including working with specialists. Ask for help creating a plan to break large goals into smaller steps. Your plan may include any of the following:  Manage other health conditions. Your healthcare provider will work with you to make a care plan that meets your needs. You will be checked regularly for heart disease or other conditions that can make CKD worse, such as diabetes. Your blood pressure will be closely monitored. You will also get a target blood pressure and help making a plan to reach your target. This may include taking your blood pressure at home. Maintain a healthy weight. Your weight and body mass index (BMI) will be checked regularly. BMI helps find if your weight is healthy for your height. Your healthcare provider will use other tests to check your muscle and protein levels. Extra weight can strain your kidneys. A low weight or low muscle mass can make you feel more tired. You may have trouble doing your daily activities. Ask your provider what a healthy weight is for you. He or she can help you create a plan to lose or gain weight safely, if needed. The plan may include keeping a food diary. This is a list of foods and liquids you have each day. Your provider will use the diary to help you make changes, if needed. Changes are based on your health and any other conditions you have, such as diabetes. Create an exercise plan. Regular exercise can help you manage CKD, high blood pressure, and diabetes. Exercise also helps control weight. Your provider can help you create exercise goals and a plan to reach those goals. For example, your goal may be to exercise for 30 minutes in a day. Your plan can include breaking exercise into 10 minute sessions, 3 times during the day. Create a healthy eating plan.   Your provider may tell you to eat food low in potassium, phosphorus, or protein. Your provider may also recommend vitamin or mineral supplements. Do not take any supplements without talking to your provider. A dietitian can help you plan meals if needed. Ask how much liquid to drink each day and which liquids are best for you. Limit sodium (salt) as directed. You may need to limit sodium to less than 2,300 milligrams (mg) each day. Ask your dietitian or healthcare provider how much sodium you can have each day. The amount depends on your stage of kidney disease. Table salt, canned foods, soups, salted snacks, and processed meats, like deli meats and sausage, are high in sodium. Your provider or a dietitian can show you how to read food labels for sodium. Limit alcohol as directed. Alcohol can cause fluid retention and can affect your kidneys. Ask how much alcohol is safe for you. A drink of alcohol is 12 ounces of beer, 5 ounces of wine, or 1½ ounces of liquor. Do not smoke. Nicotine and other chemicals in cigarettes and cigars can cause kidney damage. Ask your provider for information if you currently smoke and need help to quit. E-cigarettes or smokeless tobacco still contain nicotine. Talk to your provider before you use these products. Ask about over-the-counter medicines. Medicines such as NSAIDs and laxatives may harm your kidneys. Some cough and cold medicines can raise your blood pressure. Always ask if a medicine is safe before you take it. Ask about vaccines you may need. CKD can increase your risk for infections such as pneumonia, influenza, and hepatitis. Vaccines lower your risk for infection. Your healthcare provider will tell you which vaccines you need and when to get them. Follow up with your doctor or nephrologist as directed: You will need to return for tests to monitor your kidney and nerve function, and your parathyroid hormone level.  Your medicines may be changed, based on certain test results. Write down your questions so you remember to ask them during your visits. © Copyright Enrike Yun 2023 Information is for End User's use only and may not be sold, redistributed or otherwise used for commercial purposes. The above information is an  only. It is not intended as medical advice for individual conditions or treatments. Talk to your doctor, nurse or pharmacist before following any medical regimen to see if it is safe and effective for you.

## 2023-10-30 DIAGNOSIS — M79.605 BILATERAL LEG PAIN: ICD-10-CM

## 2023-10-30 DIAGNOSIS — M54.50 LUMBAR PAIN: ICD-10-CM

## 2023-10-30 DIAGNOSIS — M79.604 BILATERAL LEG PAIN: ICD-10-CM

## 2023-10-30 DIAGNOSIS — S99.921A INJURY OF PLANTAR PLATE, RIGHT, INITIAL ENCOUNTER: ICD-10-CM

## 2023-10-30 DIAGNOSIS — M20.11 ACQUIRED HALLUX INTERPHALANGEUS, RIGHT: ICD-10-CM

## 2023-10-30 DIAGNOSIS — M20.5X1 CLAW TOE, ACQUIRED, RIGHT: ICD-10-CM

## 2023-10-31 RX ORDER — ONDANSETRON 4 MG/1
4 TABLET, FILM COATED ORAL EVERY 8 HOURS PRN
Qty: 60 TABLET | Refills: 1 | Status: SHIPPED | OUTPATIENT
Start: 2023-10-31

## 2023-10-31 RX ORDER — TRAMADOL HYDROCHLORIDE 50 MG/1
50 TABLET ORAL EVERY 12 HOURS PRN
Qty: 60 TABLET | Refills: 0 | Status: SHIPPED | OUTPATIENT
Start: 2023-10-31

## 2023-11-01 NOTE — PSYCH
[facility]: [address] [facility], [address]    Psychiatric Progress Note  MRN#: [id number]  Donovan Olea 66 y.o. female    This note was not shared with the patient due to reasonable likelihood of causing patient harm       _________________________________________________________________________________________________________________________________  OFFICE APPOINTMENT   Seen today at [address] [facility] location                                                Patient Donovan Olea ,1960   Prescriber/Physician: Della Rojas DO Physician Location:   [address] [facility] [address] [place] [address]     This service was provided in the office. Patient is currently located in the [place], where I am  licensed. Patient gave consent to proceed with encounter; acknowledge understanding of security and privacy of encounter   Patient identity was verified as well as the [facility]  Patient verbalized understanding evaluation only involves Psychiatric diagnosing, prescribing, result monitoring   Patient was informed this is a billable service and legal   ___________________________________________________________________________________________________________________________________     Reason for Visit:                         Chief Complaint   Patient presents with   • Manic Behavior       Subjective:  [place] Areas  increased Venlafaxine, thinks its working- less anxious , more clam. Although has paranoia that others are talking about her , like her family- the sister Darrell Montiel). Tyrone Dixon lives. Stated problems about time Tyrone Dixon eats meals and when dinner is made in the home. Tyrone Dixon reported on despair regarding death of her dog, and frustration about the sister's dogs - " barking a lot " and her lacking quiet time.  Also sometimes thinks someone is following her ; paranoia while driving,and fears, thoughts that she ran someone over. Leads to [facility] circling the area to make sure nothing happened, also happens while at stores. She tires to avoid walking around areas with knives , if she can not then there's paranoia that about stabbing someone and again [facility] has to [place] the area to ensure that did not happen. She joel with avoidance although [facility] acknowledged that's time consuming                          There's also complaints of Bipolar symptoms: easily pissed out,  intermittent energy, sometime wanting to spend money - "makes her happy" if not then sad". [facility] was told via Darrell Montiel  that she talks a lot. [facility] later acknowledged that and hanh -had symptoms > 1 wk; was tangential about anger towards sister and living situation. Also think anger is cause of living with sister.       ROS:  OCD- defer to above  Hanh- defer to above  HI/SI- denies, last occurred 1 month ago w/ plan   Anxiety- improved  Sleep- wake up couple times at night and [facility]  has worries about recent dog's death , associated guilt   Guilt/Hopeless: defer to above  Appetite: intermittent and complaints of weight problem Donovan Olea unsure as to why   Tobacco: former  Alcohol: denies  Illicit: denies    Medication: Donovan Olea is  Compliant to Venlafaxine 150mg, Seroquel 800mg, Lamotrigine 400mg,  and Luvox  300mg  Med s/e- denies      Medical ROS:   Neuro: Positive Headaches, Negative head trauma   MSK: Positive back pain , knee pain , problems while walking  Pertinent items are noted in above, all other symptoms are negative     Medications Trials:  History of ECT x 30 yrs ago- did not work , Effexor, Imprimine, Lithium- can't take due to kidney dz( external records reviewed- CKD stage 4) , Risperidone - not sure as to why , Tofinail, Xanax,  Zoloft- did not work  Buspar - did not work ,  [place]- not sure why first attempt was stopped, Prozac - was stopped cause of pregnancy, Trazodone- not sure, Aripiprazole 10mg- tiredness      Mental Status Evaluation:  General Appearance:  Zeke Sandra is a 66 y.o.  female casually dressed, adequate hygiene and grooming, looks stated age, Wearing glasses    Behavior:  + agitation, appropriate  eye contact, slight  restless   Speech:  Talkative to pressured, WNL, rhythm, volume, latency, amount. Mood:  Anger   Affect:  Slight frustration    Thought Process:  normal and logical   Thought Content:  no overt delusions, normal, ruminations   Perceptual Disturbances: Does not appear responding or preoccupied   Delusions  w/o   Risk Potential: Suicidal Ideations w/o  Homicidal Ideations w/o  Potential for Aggression w/o   Sensorium:  Oriented to person, place [facility]), time/date ( November 2023)and situation    Memory:  recent and remote memory grossly intact   Consciousness:  alert and awake   Attention: decreased concentration   Insight:  Fair to appropriate   Judgment: Appropriate    Gait/Station: normal   Motor Activity: no abnormal movements     There were no vitals filed for this visit.      Medications:   Current Outpatient Medications on File Prior to Visit   Medication Sig Dispense Refill   • acetaminophen (TYLENOL) 650 mg CR tablet Take 1 tablet (650 mg total) by mouth every 8 (eight) hours as needed for mild pain (Patient taking differently: Take 500 mg by mouth every 8 (eight) hours as needed for mild pain) 30 tablet 0   • albuterol (Ventolin HFA) 90 mcg/act inhaler Inhale 2 puffs every 6 (six) hours as needed for wheezing or shortness of breath 8.5 g 3   • AMILoride 5 mg tablet Take 1 tablet (5 mg total) by mouth 2 (two) times a day 180 tablet 3   • amLODIPine (NORVASC) 5 mg tablet Take 1 tablet (5 mg total) by mouth daily 90 tablet 3   • aspirin 81 mg chewable tablet Chew 1 tablet (81 mg total) daily     • budesonide-formoterol (Symbicort) 160-4.5 mcg/act inhaler Inhale 2 puffs 2 (two) times a day Rinse mouth after use. 10.2 g 11   • carvedilol (COREG) 25 mg tablet Take 1 tablet (25 mg total) by mouth 2 (two) times a day with meals 180 tablet 3   • cholecalciferol (VITAMIN D3) 1,000 units tablet Take 5 tablets (5,000 Units total) by mouth daily 90 tablet 5   • clonazePAM (KlonoPIN) 0.5 mg tablet Take 1 tablet (0.5 mg total) by mouth 3 (three) times a day 270 tablet 0   • Cyanocobalamin (VITAMIN B 12 PO) Take by mouth     • fluvoxaMINE (LUVOX) 100 mg tablet Fluvoxamine 100m tablet in the morning ; 2 tablets at night 270 tablet 1   • lamoTRIgine (LaMICtal) 200 MG tablet Lamotrigine 200m tablet in the morning and 1 tablet at night 60 tablet 1   • omeprazole (PriLOSEC) 40 MG capsule Take 1 capsule (40 mg total) by mouth daily before breakfast 90 capsule 3   • ondansetron (ZOFRAN) 4 mg tablet Take 1 tablet (4 mg total) by mouth every 8 (eight) hours as needed for nausea or vomiting 60 tablet 1   • QUEtiapine (SEROquel) 100 mg tablet Quetiapine 100mg : 1 tablet + 400mg po at night 30 tablet 1   • QUEtiapine (SEROquel) 300 mg tablet Quetiapine 300m tablet po  in morning 30 tablet 1   • QUEtiapine (SEROquel) 400 MG tablet Quetiapine 400m tablet + 100mg tablet po at night 30 tablet 1   • rosuvastatin (CRESTOR) 20 MG tablet TAKE 1 TABLET BY MOUTH IN  THE EVENING 90 tablet 3   • traMADol (Ultram) 50 mg tablet Take 1 tablet (50 mg total) by mouth every 12 (twelve) hours as needed for moderate pain 60 tablet 0   • venlafaxine (EFFEXOR-XR) 150 mg 24 hr capsule Venlafaxine HCL ER 150m capsule po daily in the morning 30 capsule 1     No current facility-administered medications on file prior to visit. Labs: I have personally reviewed all pertinent laboratory/tests results.    Most Recent Labs:     Appointment on 10/18/2023   Component Date Value Ref Range Status   • Sodium 10/18/2023 143  135 - 147 mmol/L Final   • Potassium 10/18/2023 4.9  3.5 - 5.3 mmol/L Final   • Chloride 10/18/2023 109 (H)  96 - 108 mmol/L Final • CO2 10/18/2023 27  21 - 32 mmol/L Final   • ANION GAP 10/18/2023 7  mmol/L Final   • BUN 10/18/2023 27 (H)  5 - 25 mg/dL Final   • Creatinine 10/18/2023 2.25 (H)  0.60 - 1.30 mg/dL Final    Standardized to IDMS reference method   • Glucose, Fasting 10/18/2023 109 (H)  65 - 99 mg/dL Final   • Calcium 10/18/2023 10.2  8.4 - 10.2 mg/dL Final   • eGFR 10/18/2023 22  ml/min/1.73sq m Final     Lipids WNL 23 repeat CBC , CMP WNL; except highBUN/CR;     23 ECHO ( EF 65%). ________________________________________________________________________    A/P  Sourav Owen y.o.  female, history of  Polysubstance abuse ( cocaine, marijuana), multiple hospitalizations, several suicide attempts, anorexia, OCD, bipolar disorder, presented w/ generalized anxiety and several neurovegetative symptoms. Interim events of percustory delusions and depression. D/C Aripiprazole - s/e "spacy". Case complicated; neurocognitive deficits w/ history of head trauma. Interim reports of anxiety, currently with associated OCD symptoms and hanh ( irritability with pressure and paranoid dialogue) , likely precipitated with recent increase of Venlafaxine      DSM5  Generalized anxiety disorder  Obsessive-compulsive disorder, unspecified type  Bipolar I disorder, current or most recent episode manic, severe with mixed features        PLAN:    History and external records reviewed. Discussed clinical findings and  diagnostic impression regarding anxiety  Haldol was started  2 mg to 4 mg    Did not change other medications      Medications Prescribed During Encounter at [facility]:    -     venlafaxine (EFFEXOR-XR) 150 mg 24 hr capsule; Venlafaxine HCL ER 150m capsule po daily in the morning  -     fluvoxaMINE (LUVOX) 100 mg tablet; Fluvoxamine 100m tablet in the morning ; 2 tablets at night  -     haloperidol (HALDOL) 2 mg tablet; Haloperidol 2m tablet po daily x 1wk.  Then 1 tablet in morning and 1 tablet at night  -     QUEtiapine (SEROquel) 400 MG tablet; Quetiapine 400m tablet + 100mg tablet po at night  -     QUEtiapine (SEROquel) 300 mg tablet; Quetiapine 300m tablet po  in morning  -     QUEtiapine (SEROquel) 100 mg tablet; Quetiapine 100mg : 1 tablet + 400mg po at night  -     lamoTRIgine (LaMICtal) 200 MG tablet; Lamotrigine 200m tablet in the morning and 1 tablet at night         [employer] - " Lamotrigine , Quetiapine- [facility] ; Haldol , Fluvoxamine , Venlafaxine -[facility]      Treatement: Risks, benefits, and possible side effects of medications explained to patient and patient verbalizes understanding. [facility]    Today's Appointment@ [facility]  Face To Face:  9:32- 10:13 ;Documentation Time:   12:18PM- 12:50PM    Encounter Duration: Time Spent 41 mins with Patient. Greater than 50% of total time was spent with the patient           MDM  Number of Diagnoses or Management Options  Diagnosis management comments: 3       Amount and/or Complexity of Data Reviewed  Review and summarize past medical records: yes    Risk of Complications, Morbidity, and/or Mortality  Presenting problems: moderate  Diagnostic procedures: moderate  Management options: moderate        This note may have been written with the assistance of dictation software.  Please excuse any grammatical  errors, misspellings,  and abnormal spacing of letters , sentences or paragraphs

## 2023-11-01 NOTE — PATIENT INSTRUCTIONS
Adrián Terrazas [id number]   Thanks for presenting to today's Appointment at [address] was started 1 tablet daily x 1 wk ,then  2 tablets  Your  other medications were not changed

## 2023-11-03 ENCOUNTER — OFFICE VISIT (OUTPATIENT)
Dept: PSYCHIATRY | Facility: CLINIC | Age: 63
End: 2023-11-03
Payer: MEDICARE

## 2023-11-03 ENCOUNTER — TELEPHONE (OUTPATIENT)
Dept: PSYCHIATRY | Facility: CLINIC | Age: 63
End: 2023-11-03

## 2023-11-03 DIAGNOSIS — F42.9 OBSESSIVE-COMPULSIVE DISORDER, UNSPECIFIED TYPE: ICD-10-CM

## 2023-11-03 DIAGNOSIS — F31.13 BIPOLAR I DISORDER, CURRENT OR MOST RECENT EPISODE MANIC, SEVERE WITH MIXED FEATURES (HCC): ICD-10-CM

## 2023-11-03 DIAGNOSIS — F41.1 GENERALIZED ANXIETY DISORDER: Primary | ICD-10-CM

## 2023-11-03 PROCEDURE — 99214 OFFICE O/P EST MOD 30 MIN: CPT | Performed by: PSYCHIATRY & NEUROLOGY

## 2023-11-03 RX ORDER — FLUVOXAMINE MALEATE 100 MG
TABLET ORAL
Qty: 270 TABLET | Refills: 1 | Status: SHIPPED | OUTPATIENT
Start: 2023-11-03

## 2023-11-03 RX ORDER — QUETIAPINE FUMARATE 400 MG/1
TABLET, FILM COATED ORAL
Qty: 90 TABLET | Refills: 0 | Status: SHIPPED | OUTPATIENT
Start: 2023-11-03

## 2023-11-03 RX ORDER — HALOPERIDOL 2 MG/1
TABLET ORAL
Qty: 60 TABLET | Refills: 1 | Status: SHIPPED | OUTPATIENT
Start: 2023-11-03

## 2023-11-03 RX ORDER — QUETIAPINE FUMARATE 300 MG/1
TABLET, FILM COATED ORAL
Qty: 90 TABLET | Refills: 0 | Status: SHIPPED | OUTPATIENT
Start: 2023-11-03

## 2023-11-03 RX ORDER — QUETIAPINE FUMARATE 100 MG/1
TABLET, FILM COATED ORAL
Qty: 90 TABLET | Refills: 0 | Status: SHIPPED | OUTPATIENT
Start: 2023-11-03

## 2023-11-03 RX ORDER — QUETIAPINE FUMARATE 300 MG/1
TABLET, FILM COATED ORAL
Qty: 90 TABLET | Refills: 0 | Status: SHIPPED | OUTPATIENT
Start: 2023-11-03 | End: 2023-11-03 | Stop reason: SDUPTHER

## 2023-11-03 RX ORDER — VENLAFAXINE HYDROCHLORIDE 150 MG/1
CAPSULE, EXTENDED RELEASE ORAL
Qty: 30 CAPSULE | Refills: 1 | Status: SHIPPED | OUTPATIENT
Start: 2023-11-03

## 2023-11-03 RX ORDER — LAMOTRIGINE 200 MG/1
TABLET ORAL
Qty: 180 TABLET | Refills: 0 | Status: SHIPPED | OUTPATIENT
Start: 2023-11-03

## 2023-11-03 NOTE — TELEPHONE ENCOUNTER
Pt Edmundo Smith , 1960 , [facility] chart was reviewed.  Quetiapine 300mg was sent to [facility]    This note was not shared with the patient due to reasonable likelihood of causing patient harm

## 2023-11-03 NOTE — TELEPHONE ENCOUNTER
Pt called, said that the fluvoxamine needs to be sent to [facility] instead of [facility], she's asking if you can resend it

## 2023-11-03 NOTE — TELEPHONE ENCOUNTER
Pt called, said that the quetiapine 300mg was sent to the wrong pharmacy, asked that you resend it but to [facility] instead of [facility]

## 2023-11-06 RX ORDER — FLUVOXAMINE MALEATE 100 MG
TABLET ORAL
Qty: 270 TABLET | Refills: 1 | Status: SHIPPED | OUTPATIENT
Start: 2023-11-06

## 2023-11-06 NOTE — TELEPHONE ENCOUNTER
Pt Marielena Eye, 1960 , [facility] chart was reviewed. Void script Luvox sent to [facility] via fax.   Recent Luvox 100mg qty 270 to [address]      This note was not shared with the patient due to reasonable likelihood of causing patient harm

## 2023-11-10 ENCOUNTER — APPOINTMENT (OUTPATIENT)
Dept: RADIOLOGY | Facility: HOSPITAL | Age: 63
DRG: 089 | End: 2023-11-10
Payer: MEDICARE

## 2023-11-10 ENCOUNTER — APPOINTMENT (EMERGENCY)
Dept: CT IMAGING | Facility: HOSPITAL | Age: 63
DRG: 089 | End: 2023-11-10
Payer: MEDICARE

## 2023-11-10 ENCOUNTER — HOSPITAL ENCOUNTER (INPATIENT)
Facility: HOSPITAL | Age: 63
LOS: 3 days | Discharge: NURSING, REHAB OR LONG-TERM CARE | DRG: 089 | End: 2023-11-14
Attending: EMERGENCY MEDICINE | Admitting: INTERNAL MEDICINE
Payer: MEDICARE

## 2023-11-10 ENCOUNTER — APPOINTMENT (EMERGENCY)
Dept: RADIOLOGY | Facility: HOSPITAL | Age: 63
DRG: 089 | End: 2023-11-10
Payer: MEDICARE
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
## 2023-11-10 DIAGNOSIS — T14.8XXA TRAUMATIC ECCHYMOSIS OF MULTIPLE SITES: ICD-10-CM

## 2023-11-10 DIAGNOSIS — R09.02 HYPOXIA: Primary | ICD-10-CM

## 2023-11-10 DIAGNOSIS — G44.309 POST-TRAUMATIC HEADACHE: ICD-10-CM

## 2023-11-10 DIAGNOSIS — S01.111A EYEBROW LACERATION, RIGHT, INITIAL ENCOUNTER: ICD-10-CM

## 2023-11-10 LAB
ABO GROUP BLD: NORMAL
ALBUMIN SERPL BCP-MCNC: 4.4 G/DL (ref 3.5–5)
ALP SERPL-CCNC: 191 U/L (ref 34–104)
ALT SERPL W P-5'-P-CCNC: 21 U/L (ref 7–52)
ANION GAP SERPL CALCULATED.3IONS-SCNC: 8 MMOL/L
AST SERPL W P-5'-P-CCNC: 24 U/L (ref 13–39)
ATRIAL RATE: 77 BPM
BASOPHILS # BLD AUTO: 0.03 THOUSANDS/ÂΜL (ref 0–0.1)
BASOPHILS NFR BLD AUTO: 0 % (ref 0–1)
BILIRUB SERPL-MCNC: 0.37 MG/DL (ref 0.2–1)
BLD GP AB SCN SERPL QL: NEGATIVE
BUN SERPL-MCNC: 22 MG/DL (ref 5–25)
CALCIUM SERPL-MCNC: 9.9 MG/DL (ref 8.4–10.2)
CHLORIDE SERPL-SCNC: 107 MMOL/L (ref 96–108)
CO2 SERPL-SCNC: 25 MMOL/L (ref 21–32)
CREAT SERPL-MCNC: 2.6 MG/DL (ref 0.6–1.3)
EOSINOPHIL # BLD AUTO: 0 THOUSAND/ÂΜL (ref 0–0.61)
EOSINOPHIL NFR BLD AUTO: 0 % (ref 0–6)
ERYTHROCYTE [DISTWIDTH] IN BLOOD BY AUTOMATED COUNT: 13.3 % (ref 11.6–15.1)
GFR SERPL CREATININE-BSD FRML MDRD: 19 ML/MIN/1.73SQ M
GLUCOSE SERPL-MCNC: 108 MG/DL (ref 65–140)
HCT VFR BLD AUTO: 38.5 % (ref 34.8–46.1)
HGB BLD-MCNC: 12 G/DL (ref 11.5–15.4)
IMM GRANULOCYTES # BLD AUTO: 0.04 THOUSAND/UL (ref 0–0.2)
IMM GRANULOCYTES NFR BLD AUTO: 1 % (ref 0–2)
LYMPHOCYTES # BLD AUTO: 1.22 THOUSANDS/ÂΜL (ref 0.6–4.47)
LYMPHOCYTES NFR BLD AUTO: 15 % (ref 14–44)
MCH RBC QN AUTO: 31.1 PG (ref 26.8–34.3)
MCHC RBC AUTO-ENTMCNC: 31.2 G/DL (ref 31.4–37.4)
MCV RBC AUTO: 100 FL (ref 82–98)
MONOCYTES # BLD AUTO: 0.55 THOUSAND/ÂΜL (ref 0.17–1.22)
MONOCYTES NFR BLD AUTO: 7 % (ref 4–12)
NEUTROPHILS # BLD AUTO: 6.06 THOUSANDS/ÂΜL (ref 1.85–7.62)
NEUTS SEG NFR BLD AUTO: 77 % (ref 43–75)
NRBC BLD AUTO-RTO: 0 /100 WBCS
P AXIS: 62 DEGREES
PLATELET # BLD AUTO: 219 THOUSANDS/UL (ref 149–390)
PMV BLD AUTO: 10.3 FL (ref 8.9–12.7)
POTASSIUM SERPL-SCNC: 4.8 MMOL/L (ref 3.5–5.3)
PR INTERVAL: 142 MS
PROT SERPL-MCNC: 7.4 G/DL (ref 6.4–8.4)
QRS AXIS: 112 DEGREES
QRSD INTERVAL: 136 MS
QT INTERVAL: 426 MS
QTC INTERVAL: 482 MS
RBC # BLD AUTO: 3.86 MILLION/UL (ref 3.81–5.12)
RH BLD: POSITIVE
SODIUM SERPL-SCNC: 140 MMOL/L (ref 135–147)
SPECIMEN EXPIRATION DATE: NORMAL
T WAVE AXIS: 41 DEGREES
VENTRICULAR RATE: 77 BPM
WBC # BLD AUTO: 7.9 THOUSAND/UL (ref 4.31–10.16)

## 2023-11-10 PROCEDURE — 99223 1ST HOSP IP/OBS HIGH 75: CPT | Performed by: INTERNAL MEDICINE

## 2023-11-10 PROCEDURE — 36415 COLL VENOUS BLD VENIPUNCTURE: CPT | Performed by: EMERGENCY MEDICINE

## 2023-11-10 PROCEDURE — 70450 CT HEAD/BRAIN W/O DYE: CPT

## 2023-11-10 PROCEDURE — 86850 RBC ANTIBODY SCREEN: CPT | Performed by: EMERGENCY MEDICINE

## 2023-11-10 PROCEDURE — 86900 BLOOD TYPING SEROLOGIC ABO: CPT | Performed by: EMERGENCY MEDICINE

## 2023-11-10 PROCEDURE — 71045 X-RAY EXAM CHEST 1 VIEW: CPT

## 2023-11-10 PROCEDURE — 85025 COMPLETE CBC W/AUTO DIFF WBC: CPT | Performed by: EMERGENCY MEDICINE

## 2023-11-10 PROCEDURE — 93005 ELECTROCARDIOGRAM TRACING: CPT

## 2023-11-10 PROCEDURE — 99285 EMERGENCY DEPT VISIT HI MDM: CPT

## 2023-11-10 PROCEDURE — 80053 COMPREHEN METABOLIC PANEL: CPT | Performed by: EMERGENCY MEDICINE

## 2023-11-10 PROCEDURE — 73564 X-RAY EXAM KNEE 4 OR MORE: CPT

## 2023-11-10 PROCEDURE — 99285 EMERGENCY DEPT VISIT HI MDM: CPT | Performed by: EMERGENCY MEDICINE

## 2023-11-10 PROCEDURE — 74177 CT ABD & PELVIS W/CONTRAST: CPT

## 2023-11-10 PROCEDURE — 72125 CT NECK SPINE W/O DYE: CPT

## 2023-11-10 PROCEDURE — 86901 BLOOD TYPING SEROLOGIC RH(D): CPT | Performed by: EMERGENCY MEDICINE

## 2023-11-10 PROCEDURE — 71260 CT THORAX DX C+: CPT

## 2023-11-10 RX ORDER — ACETAMINOPHEN 325 MG/1
650 TABLET ORAL EVERY 6 HOURS PRN
Status: DISCONTINUED | OUTPATIENT
Start: 2023-11-10 | End: 2023-11-14 | Stop reason: HOSPADM

## 2023-11-10 RX ORDER — FLUVOXAMINE MALEATE 100 MG
200 TABLET ORAL
Status: DISCONTINUED | OUTPATIENT
Start: 2023-11-10 | End: 2023-11-14 | Stop reason: HOSPADM

## 2023-11-10 RX ORDER — HEPARIN SODIUM 5000 [USP'U]/ML
5000 INJECTION, SOLUTION INTRAVENOUS; SUBCUTANEOUS EVERY 12 HOURS SCHEDULED
Status: DISCONTINUED | OUTPATIENT
Start: 2023-11-10 | End: 2023-11-14 | Stop reason: HOSPADM

## 2023-11-10 RX ORDER — HALOPERIDOL 2 MG/1
2 TABLET ORAL 2 TIMES DAILY
Status: DISCONTINUED | OUTPATIENT
Start: 2023-11-10 | End: 2023-11-14 | Stop reason: HOSPADM

## 2023-11-10 RX ORDER — TRAMADOL HYDROCHLORIDE 50 MG/1
50 TABLET ORAL 2 TIMES DAILY PRN
Status: DISCONTINUED | OUTPATIENT
Start: 2023-11-10 | End: 2023-11-14 | Stop reason: HOSPADM

## 2023-11-10 RX ORDER — ACETAMINOPHEN 325 MG/1
975 TABLET ORAL ONCE
Status: COMPLETED | OUTPATIENT
Start: 2023-11-10 | End: 2023-11-10

## 2023-11-10 RX ORDER — LAMOTRIGINE 100 MG/1
200 TABLET ORAL 2 TIMES DAILY
Status: DISCONTINUED | OUTPATIENT
Start: 2023-11-10 | End: 2023-11-14 | Stop reason: HOSPADM

## 2023-11-10 RX ORDER — ONDANSETRON 2 MG/ML
4 INJECTION INTRAMUSCULAR; INTRAVENOUS EVERY 6 HOURS PRN
Status: DISCONTINUED | OUTPATIENT
Start: 2023-11-10 | End: 2023-11-10

## 2023-11-10 RX ORDER — QUETIAPINE FUMARATE 200 MG/1
400 TABLET, FILM COATED ORAL
Status: DISCONTINUED | OUTPATIENT
Start: 2023-11-10 | End: 2023-11-10

## 2023-11-10 RX ORDER — VENLAFAXINE HYDROCHLORIDE 37.5 MG/1
150 CAPSULE, EXTENDED RELEASE ORAL EVERY MORNING
Status: DISCONTINUED | OUTPATIENT
Start: 2023-11-11 | End: 2023-11-14 | Stop reason: HOSPADM

## 2023-11-10 RX ORDER — LIDOCAINE HYDROCHLORIDE AND EPINEPHRINE 10; 10 MG/ML; UG/ML
10 INJECTION, SOLUTION INFILTRATION; PERINEURAL ONCE
Status: COMPLETED | OUTPATIENT
Start: 2023-11-10 | End: 2023-11-10

## 2023-11-10 RX ORDER — AMILORIDE HYDROCHLORIDE 5 MG/1
5 TABLET ORAL 2 TIMES DAILY
Status: DISCONTINUED | OUTPATIENT
Start: 2023-11-10 | End: 2023-11-14 | Stop reason: HOSPADM

## 2023-11-10 RX ORDER — PRAVASTATIN SODIUM 40 MG
40 TABLET ORAL
Status: DISCONTINUED | OUTPATIENT
Start: 2023-11-10 | End: 2023-11-14 | Stop reason: HOSPADM

## 2023-11-10 RX ORDER — ALBUTEROL SULFATE 90 UG/1
2 AEROSOL, METERED RESPIRATORY (INHALATION) EVERY 6 HOURS PRN
Status: DISCONTINUED | OUTPATIENT
Start: 2023-11-10 | End: 2023-11-14 | Stop reason: HOSPADM

## 2023-11-10 RX ORDER — MELATONIN
5000 DAILY
Status: DISCONTINUED | OUTPATIENT
Start: 2023-11-11 | End: 2023-11-14 | Stop reason: HOSPADM

## 2023-11-10 RX ORDER — LIDOCAINE 40 MG/G
CREAM TOPICAL ONCE
Status: COMPLETED | OUTPATIENT
Start: 2023-11-10 | End: 2023-11-10

## 2023-11-10 RX ORDER — CLONAZEPAM 0.5 MG/1
0.5 TABLET ORAL 3 TIMES DAILY
Status: DISCONTINUED | OUTPATIENT
Start: 2023-11-10 | End: 2023-11-14 | Stop reason: HOSPADM

## 2023-11-10 RX ORDER — BUDESONIDE AND FORMOTEROL FUMARATE DIHYDRATE 160; 4.5 UG/1; UG/1
2 AEROSOL RESPIRATORY (INHALATION) 2 TIMES DAILY
Status: DISCONTINUED | OUTPATIENT
Start: 2023-11-10 | End: 2023-11-14 | Stop reason: HOSPADM

## 2023-11-10 RX ORDER — AMLODIPINE BESYLATE 5 MG/1
5 TABLET ORAL DAILY
Status: DISCONTINUED | OUTPATIENT
Start: 2023-11-11 | End: 2023-11-14 | Stop reason: HOSPADM

## 2023-11-10 RX ORDER — ASPIRIN 81 MG/1
81 TABLET, CHEWABLE ORAL DAILY
Status: DISCONTINUED | OUTPATIENT
Start: 2023-11-11 | End: 2023-11-14 | Stop reason: HOSPADM

## 2023-11-10 RX ORDER — PANTOPRAZOLE SODIUM 40 MG/1
40 TABLET, DELAYED RELEASE ORAL
Status: DISCONTINUED | OUTPATIENT
Start: 2023-11-11 | End: 2023-11-14 | Stop reason: HOSPADM

## 2023-11-10 RX ORDER — FLUVOXAMINE MALEATE 100 MG
100 TABLET ORAL EVERY MORNING
Status: DISCONTINUED | OUTPATIENT
Start: 2023-11-11 | End: 2023-11-14 | Stop reason: HOSPADM

## 2023-11-10 RX ORDER — CARVEDILOL 12.5 MG/1
25 TABLET ORAL 2 TIMES DAILY WITH MEALS
Status: DISCONTINUED | OUTPATIENT
Start: 2023-11-10 | End: 2023-11-14 | Stop reason: HOSPADM

## 2023-11-10 RX ORDER — QUETIAPINE FUMARATE 300 MG/1
300 TABLET, FILM COATED ORAL EVERY MORNING
Status: DISCONTINUED | OUTPATIENT
Start: 2023-11-11 | End: 2023-11-14 | Stop reason: HOSPADM

## 2023-11-10 RX ADMIN — HALOPERIDOL 2 MG: 2 TABLET ORAL at 21:36

## 2023-11-10 RX ADMIN — CLONAZEPAM 0.5 MG: 0.5 TABLET ORAL at 21:36

## 2023-11-10 RX ADMIN — PRAVASTATIN SODIUM 40 MG: 40 TABLET ORAL at 17:37

## 2023-11-10 RX ADMIN — SODIUM CHLORIDE 500 ML: 0.9 INJECTION, SOLUTION INTRAVENOUS at 11:49

## 2023-11-10 RX ADMIN — CARVEDILOL 25 MG: 12.5 TABLET, FILM COATED ORAL at 17:37

## 2023-11-10 RX ADMIN — TRAMADOL HYDROCHLORIDE 50 MG: 50 TABLET, COATED ORAL at 19:58

## 2023-11-10 RX ADMIN — BUDESONIDE AND FORMOTEROL FUMARATE DIHYDRATE 2 PUFF: 160; 4.5 AEROSOL RESPIRATORY (INHALATION) at 18:19

## 2023-11-10 RX ADMIN — LAMOTRIGINE 200 MG: 100 TABLET ORAL at 17:37

## 2023-11-10 RX ADMIN — FLUVOXAMINE MALEATE 200 MG: 100 TABLET ORAL at 22:24

## 2023-11-10 RX ADMIN — QUETIAPINE 500 MG: 200 TABLET ORAL at 22:24

## 2023-11-10 RX ADMIN — HEPARIN SODIUM 5000 UNITS: 5000 INJECTION INTRAVENOUS; SUBCUTANEOUS at 19:58

## 2023-11-10 RX ADMIN — LIDOCAINE HYDROCHLORIDE,EPINEPHRINE BITARTRATE 10 ML: 10; .01 INJECTION, SOLUTION INFILTRATION; PERINEURAL at 11:49

## 2023-11-10 RX ADMIN — CLONAZEPAM 0.5 MG: 0.5 TABLET ORAL at 17:37

## 2023-11-10 RX ADMIN — AMILORIDE HYDROCLORIDE 5 MG: 5 TABLET ORAL at 19:58

## 2023-11-10 RX ADMIN — ACETAMINOPHEN 650 MG: 325 TABLET, FILM COATED ORAL at 21:40

## 2023-11-10 RX ADMIN — ACETAMINOPHEN 975 MG: 325 TABLET, FILM COATED ORAL at 11:31

## 2023-11-10 RX ADMIN — IOHEXOL 80 ML: 350 INJECTION, SOLUTION INTRAVENOUS at 10:30

## 2023-11-10 RX ADMIN — LIDOCAINE 4%: 4 CREAM TOPICAL at 18:45

## 2023-11-10 NOTE — ED PROVIDER NOTES
Emergency Department Trauma Note  Kirsten Colon 66 y.o. female MRN: [id number]  Unit/Bed#: ED 05/ED 05 Encounter: [id number]      Trauma Alert: Trauma Acuity: Trauma Evaluation  Model of Arrival: Mode of Arrival: Direct from scene via    Trauma Team: Current Providers  Attending Provider: Gianna Greer DO  Attending Provider: Madhu Walker MD  Attending Provider: Ray Babinski, MD  Registered Nurse: Rusty Cotton RN  Registered Nurse: Renetta Mendoza RN  Registered Nurse: Rosie Burnett RN  Consultants:     None      History of Present Illness     Chief Complaint:   Chief Complaint   Patient presents with    Fall     Pt fell down 13 wooden steps this AM at 0700 and landed on carpet landing. Pt is on ASA, denies LOC. Pt has laceration to R eyebrow. HPI:  Kirsten Colon is a 66 y.o. female who takes 81 mg aspirin daily who presents with headache after falling down the stairs. Patient reports that she lives on the attic level and was climbing down the 26 steps it takes for her to get downstairs when she tripped and fell tumbling down 13 wooden steps onto a carpet. She states that she hit her head, but did not lose consciousness. Complains of a headache since the trauma, constant, over the entire head, nonradiating, moderate in intensity, throbbing in nature without any other associated symptoms. She also complains of aches and pains all over her body since the fall. She states that she has arthritis and she feels like everything feels worse since falling. Denies any neck pain, back pain, loss of bowel or bladder control, numbness, tingling, weakness, changes in vision, nausea, abdominal pain. Mechanism:Details of Incident: pt reports tripping on her pants this am at the top of the steps causing her to fall down 13 steps. takes 81mg of aspirin, no loc. Injury Date: 11/10/23 Injury Time: 0700      HPI  Review of Systems   Eyes:  Negative for visual disturbance. Respiratory:  Negative for shortness of breath. Cardiovascular:  Negative for chest pain, palpitations and leg swelling. Gastrointestinal:  Negative for abdominal pain, diarrhea, nausea and vomiting. Musculoskeletal:  Negative for back pain and neck pain. Skin:  Positive for wound. Neurological:  Positive for headaches. Negative for dizziness, tremors, seizures, syncope, speech difficulty, weakness, light-headedness and numbness. Psychiatric/Behavioral:  Negative for confusion.         Historical Information     Immunizations:   Immunization History   Administered Date(s) Administered    COVID-19 PFIZER VACCINE 0.3 ML IM 05/05/2021, 05/29/2021, 01/21/2022    Influenza, recombinant, quadrivalent,injectable, preservative free 01/02/2019, 10/02/2019, 01/03/2022, 10/10/2022    Pneumococcal Conjugate 13-Valent 08/14/2019    Pneumococcal Polysaccharide PPV23 01/31/2022    Tdap 08/14/2019       Past Medical History:   Diagnosis Date    Anxiety     Anxiety disorder     Arthritis     At risk for falls     Bipolar 2 disorder ([address])     Chronic back pain     Chronic kidney disease     Closed fracture of distal end of right fibula with routine healing 11/04/2020    COVID-19     in Jan 2021    CVA (cerebral vascular accident) ([address])     noted on MRI in the past    Depression     GERD (gastroesophageal reflux disease)     Hypercholesteremia     Hypernatremia     Hypertension     Hypokalemia     Idiopathic chronic pancreatitis ([address]) 03/20/2018    Intervertebral disc disorder with radiculopathy of lumbosacral region     resolved: 12/28/2015    Kidney disease     Kidney disease     Limb alert care status     LUE-fistula    Panic attacks     Pericardial effusion     PONV (postoperative nausea and vomiting)     Psychiatric problem     Radiculitis     resolved: 12/28/2015    Secondary renal hyperparathyroidism ([address])     Stroke ([address])     Vitamin D deficiency        Family History   Problem Relation Age of Onset    Bipolar disorder Mother     Mental illness Mother         depression    Stroke Mother     Dementia Mother     Colon polyps Mother     Heart disease Father     Hypertension Father     Diabetes Father     Other Family         Back disorder    Diabetes Family     Heart disease Family     Hypertension Family     Stroke Family     Thyroid disease Family     Breast cancer Paternal Grandmother         age unknown    Breast cancer Paternal Aunt         age unknown    Breast cancer Maternal Aunt         age unknown    Mental illness Sister     Colon polyps Sister     Mental illness Sister     Heart disease Sister     No Known Problems Sister     Breast cancer Sister 76    Other Son         pituitary tumor    Hypertension Son     Obesity Son     No Known Problems Son     No Known Problems Maternal Grandmother     No Known Problems Maternal Grandfather     No Known Problems Paternal Grandfather     Breast cancer Paternal Aunt         age unknown    Substance Abuse Neg Hx         neg fam hx    Colon cancer Neg Hx      Past Surgical History:   Procedure Laterality Date    BUNIONECTOMY      Left foot     CAST APPLICATION Right     Procedure: Application short-arm thumb spica splint;  Surgeon: Daljit Michaels MD;  Location: [facility];  Service: Orthopedics    COLON SURGERY      COLONOSCOPY  2021    DILATION AND CURETTAGE OF UTERUS      INDUCED       surgically induced    MS ARTERIOVENOUS ANASTOMOSIS OPEN DIRECT Left 2019    Procedure: CREATION FISTULA ARTERIOVENOUS (AV) left wrists possible left upper;  Surgeon: Al Lu MD;  Location: [facility];  Service: Vascular    MS [place] W/SESMDC W/DIST Ole Jess Left 2019    Procedure: Rebeca Clap;  Surgeon: Starr Wilks DPM;  [facility];  Service: Podiatry    MS CORRJ HALLUX VALGUS W/SESMDC W/DIST Ole Jess Right 8/3/2020    Procedure: Laymon Lights;  Surgeon: Starr Wilks DPM;  Location: [facility]; Service: Podiatry    MT CORRJ HALLUX VALGUS W/SESMDC W/PROX PHLNX OSTEOT Right 9/27/2021    Procedure: Vitaly Vidal, right tameka osteotomy and 2nd claw toe correction;  Surgeon: Mikey Duque MD;  Location: [facility];  Service: Orthopedics    MT ERCP DX COLLECTION SPECIMEN BRUSHING/WASHING N/A 4/11/2018    Procedure: ENDOSCOPIC RETROGRADE CHOLANGIOPANCREATOGRAPHY (ERCP); Surgeon: Mendel Cullens, MD;  Location[facility];  Service: Gastroenterology    MT LAPAROSCOPY PROCTOPEXY PROLAPSE N/A 7/13/2018    Procedure: ROBOTIC SIGMOID RESECTION / RECTOPEXY;  Surgeon: May Alfredo MD;  [facility];  Service: Colorectal    MT OPEN TREATMENT RADIAL SHAFT FRACTURE Right 10/11/2021    Procedure: OPEN REDUCTION W/ INTERNAL FIXATION (ORIF) RADIUS (WRIST), RIGHT DISTAL;  Surgeon: Ellny Hobbs MD;  Location: [facility];  Service: Orthopedics    MT REMOVAL IMPLANT DEEP Right 6/23/2022    Procedure: Removal of hardware volar aspect right distal radius (distal radial plate and screws);   Surgeon: Arnoldo Holly MD;  Location: [facility];  Service: Orthopedics    MT SIGMOIDOSCOPY FLX DX W/COLLJ SPEC BR/WA IF PFRMD N/A 7/13/2018    Procedure: Joby Warren;  Surgeon: May Alfredo MD;  [facility];  Service: Colorectal    MT TR TDN RESTORE INTRNSC FUNCJ ALL 4 FNGRS Right 6/23/2022    Procedure: Right ring finger flexor digitorum superficialis to flexor pollicis longus tendon transfer;  Surgeon: Arnoldo Holly MD;  Location: [facility];  Service: Orthopedics    TUBAL LIGATION Bilateral [address] [place] [address] THYROID BIOPSY  7/30/2019     Social History     Tobacco Use    Smoking status: Never    Smokeless tobacco: Never   Vaping Use    Vaping Use: Never used   Substance Use Topics    Alcohol use: Never    Drug use: Not Currently     E-Cigarette/Vaping    E-Cigarette Use Never User      E-Cigarette/Vaping Substances    Nicotine No     THC No     CBD No     Flavoring No     Other No Unknown No        Family History: non-contributory    Meds/Allergies   Prior to Admission Medications   Prescriptions Last Dose Informant Patient Reported? Taking? AMILoride 5 mg tablet 11/10/2023 Self No Yes   Sig: Take 1 tablet (5 mg total) by mouth 2 (two) times a day   Cyanocobalamin (VITAMIN B 12 PO) 11/10/2023  Yes Yes   Sig: Take by mouth   QUEtiapine (SEROquel) 100 mg tablet 2023  No Yes   Sig: Quetiapine 100mg : 1 tablet + 400mg po at night   QUEtiapine (SEROquel) 300 mg tablet 11/10/2023  No Yes   Sig: Quetiapine 300m tablet po  in morning   QUEtiapine (SEROquel) 400 MG tablet Not Taking  No No   Sig: Quetiapine 400m tablet + 100mg tablet po at night   Patient not taking: Reported on 11/10/2023   acetaminophen (TYLENOL) 650 mg CR tablet  Self No No   Sig: Take 1 tablet (650 mg total) by mouth every 8 (eight) hours as needed for mild pain   Patient taking differently: Take 500 mg by mouth every 8 (eight) hours as needed for mild pain   albuterol (Ventolin HFA) 90 mcg/act inhaler  Self No No   Sig: Inhale 2 puffs every 6 (six) hours as needed for wheezing or shortness of breath   amLODIPine (NORVASC) 5 mg tablet 11/10/2023 Self No Yes   Sig: Take 1 tablet (5 mg total) by mouth daily   aspirin 81 mg chewable tablet 11/10/2023 Self No Yes   Sig: Chew 1 tablet (81 mg total) daily   budesonide-formoterol (Symbicort) 160-4.5 mcg/act inhaler 11/10/2023 Self No Yes   Sig: Inhale 2 puffs 2 (two) times a day Rinse mouth after use.    carvedilol (COREG) 25 mg tablet 11/10/2023 Self No Yes   Sig: Take 1 tablet (25 mg total) by mouth 2 (two) times a day with meals   cholecalciferol (VITAMIN D3) 1,000 units tablet 11/10/2023 Self No Yes   Sig: Take 5 tablets (5,000 Units total) by mouth daily   clonazePAM (KlonoPIN) 0.5 mg tablet Past Week  No Yes   Sig: Take 1 tablet (0.5 mg total) by mouth 3 (three) times a day   fluvoxaMINE (LUVOX) 100 mg tablet 11/10/2023  No Yes   Sig: Fluvoxamine 100m tablet in the morning ; 2 tablets at night   haloperidol (HALDOL) 2 mg tablet 11/10/2023  No Yes   Sig: Haloperidol 2m tablet po daily x 1wk. Then 1 tablet in morning and 1 tablet at night   lamoTRIgine (LaMICtal) 200 MG tablet 11/10/2023  No Yes   Sig: Lamotrigine 200m tablet in the morning and 1 tablet at night   omeprazole (PriLOSEC) 40 MG capsule 11/10/2023 Self No Yes   Sig: Take 1 capsule (40 mg total) by mouth daily before breakfast   ondansetron (ZOFRAN) 4 mg tablet 11/10/2023  No Yes   Sig: Take 1 tablet (4 mg total) by mouth every 8 (eight) hours as needed for nausea or vomiting   rosuvastatin (CRESTOR) 20 MG tablet 2023 Self No Yes   Sig: TAKE 1 TABLET BY MOUTH IN  THE EVENING   traMADol (Ultram) 50 mg tablet 11/10/2023  No Yes   Sig: Take 1 tablet (50 mg total) by mouth every 12 (twelve) hours as needed for moderate pain   venlafaxine (EFFEXOR-XR) 150 mg 24 hr capsule 11/10/2023  No Yes   Sig: Venlafaxine HCL ER 150m capsule po daily in the morning      Facility-Administered Medications: None       Allergies   Allergen Reactions    Molds & Smuts Nasal Congestion    Bee Pollen Nasal Congestion     Other reaction(s): Nasal Congestion    Pollen Extract Nasal Congestion       PHYSICAL EXAM    PE limited by: N/A    Objective   Vitals:   First set: Temperature: 98.1 °F (36.7 °C) (11/10/23 1001)  Pulse: 76 (11/10/23 1001)  Respirations: 18 (11/10/23 1001)  Blood Pressure: 133/66 (11/10/23 1001)  SpO2: 97 % (11/10/23 1001)    Primary Survey:   (A) Airway: intact  (B) Breathing: CTABL  (C) Circulation: Pulses:   normal  (D) Disabliity:  GCS Total:  15  (E) Expose:  Completed    Secondary Survey: (Click on Physical Exam tab above)  Physical Exam  Vitals and nursing note reviewed. Constitutional:       General: She is not in acute distress. Appearance: Normal appearance. She is not ill-appearing, toxic-appearing or diaphoretic. HENT:      Head: Normocephalic. Right Ear: No hemotympanum. Left Ear: No hemotympanum. Nose:      Right Nostril: No epistaxis or septal hematoma. Left Nostril: No epistaxis or septal hematoma. Mouth/Throat:      Mouth: Mucous membranes are moist.   Eyes:      Conjunctiva/sclera: Conjunctivae normal.      Pupils: Pupils are equal, round, and reactive to light. Cardiovascular:      Rate and Rhythm: Normal rate and regular rhythm. Pulses: Normal pulses. Heart sounds: Normal heart sounds. No murmur heard. Pulmonary:      Effort: Pulmonary effort is normal. No respiratory distress. Breath sounds: Normal breath sounds. No stridor. No wheezing, rhonchi or rales. Chest:      Chest wall: No tenderness. Abdominal:      General: Bowel sounds are normal. There is no distension. Palpations: Abdomen is soft. Tenderness: There is no abdominal tenderness. There is no guarding or rebound. Musculoskeletal:      Cervical back: Neck supple. Comments: No midline c-t-l-spine tenderness, step offs, deformities  Pelvis stable   Ecchymoses over bilateral knees, left mid back   Skin:     General: Skin is warm and dry. Neurological:      General: No focal deficit present. Mental Status: She is alert and oriented to person, place, and time. Mental status is at baseline. Psychiatric:         Mood and Affect: Mood normal.         Behavior: Behavior normal.         Cervical spine cleared by clinical criteria?  No (imaging required)      Invasive Devices       Peripheral Intravenous Line  Duration             Peripheral IV 11/10/23 Right Antecubital <1 day              Line  Duration             Hemodialysis AV Fistula 11/18/19 Left  1453 days                    Lab Results:   Results Reviewed       Procedure Component Value Units Date/Time    Comprehensive metabolic panel [[id number]]  (Abnormal) Collected: 11/10/23 1014    Lab Status: Final result Specimen: Blood from Arm, Right Updated: 11/10/23 1038     Sodium 140 mmol/L      Potassium 4.8 mmol/L      Chloride 107 mmol/L      CO2 25 mmol/L      ANION GAP 8 mmol/L      BUN 22 mg/dL      Creatinine 2.60 mg/dL      Glucose 108 mg/dL      Calcium 9.9 mg/dL      AST 24 U/L      ALT 21 U/L      Alkaline Phosphatase 191 U/L      Total Protein 7.4 g/dL      Albumin 4.4 g/dL      Total Bilirubin 0.37 mg/dL      eGFR 19 ml/min/1.73sq m     Narrative:      National Kid[employer] guidelines for Chronic Kidney Disease (CKD):     Stage 1 with normal or high GFR (GFR > 90 mL/min/1.73 square meters)    Stage 2 Mild CKD (GFR = 60-89 mL/min/1.73 square meters)    Stage 3A Moderate CKD (GFR = 45-59 mL/min/1.73 square meters)    Stage 3B Moderate CKD (GFR = 30-44 mL/min/1.73 square meters)    Stage 4 Severe CKD (GFR = 15-29 mL/min/1.73 square meters)    Stage 5 End Stage CKD (GFR <15 mL/min/1.73 square meters)  Note: GFR calculation is accurate only with a steady state creatinine    CBC and differential [[id number]]  (Abnormal) Collected: 11/10/23 1014    Lab Status: Final result Specimen: Blood from Arm, Right Updated: 11/10/23 1021     WBC 7.90 Thousand/uL      RBC 3.86 Million/uL      Hemoglobin 12.0 g/dL      Hematocrit 38.5 %       fL      MCH 31.1 pg      MCHC 31.2 g/dL      RDW 13.3 %      MPV 10.3 fL      Platelets 372 Thousands/uL      nRBC 0 /100 WBCs      Neutrophils Relative 77 %      Immat GRANS % 1 %      Lymphocytes Relative 15 %      Monocytes Relative 7 %      Eosinophils Relative 0 %      Basophils Relative 0 %      Neutrophils Absolute 6.06 Thousands/µL      Immature Grans Absolute 0.04 Thousand/uL      Lymphocytes Absolute 1.22 Thousands/µL      Monocytes Absolute 0.55 Thousand/µL      Eosinophils Absolute 0.00 Thousand/µL      Basophils Absolute 0.03 Thousands/µL                    Imaging Studies:   Direct to CT: Yes  TRAUMA - CT head wo contrast   Final Result by Alexey Aviles MD (11/10 1051)      No acute intracranial abnormality. Chronic microangiopathic changes.  Stable chronic left basal ganglia lacunar infarction. Workstation performed: [id number]         TRAUMA - CT spine cervical wo contrast   Final Result by Maritza Trujillo MD (11/10 1053)      No cervical spine fracture or traumatic malalignment. Workstation performed: [id number]         TRAUMA - CT chest abdomen pelvis w contrast   Final Result by Maritza Trujillo MD (11/10 1101)   1. No evidence of acute posttraumatic injury throughout the chest, abdomen or pelvis. 2. Focal right upper lobe groundglass opacity noted on prior study has resolved during the interim. 3. High density dependent probable tablets within the cecum as also noted on prior examinations. Workstation performed: [id number]         XR Trauma chest portable   Final Result by Emily Harp MD (11/10 1028)      No acute cardiopulmonary disease. Workstation performed: [id number]         XR knee 4+ vw left injury    (Results Pending)   XR knee 4+ vw right injury    (Results Pending)         Procedures  Universal Protocol:  Consent: Verbal consent obtained. Written consent not obtained. Risks and benefits: risks, benefits and alternatives were discussed  Consent given by: patient  Patient identity confirmed: verbally with patient  Laceration repair    Date/Time: 11/10/2023 11:45 AM    Performed by: Juanita Martin DO  Authorized by: Juanita Martin DO  Body area: head/neck  Location details: right eyebrow  Laceration length: 2 cm  Foreign bodies: no foreign bodies  Anesthesia: local infiltration    Anesthesia:  Local Anesthetic: lidocaine 1% with epinephrine  Anesthetic total: 4 mL      Procedure Details:  Preparation: Patient was prepped and draped in the usual sterile fashion.   Irrigation solution: saline  Irrigation method: syringe  Amount of cleaning: standard  Skin closure: 5-0 nylon  Number of sutures: 6  Technique: simple  Approximation: close  Approximation difficulty: complex  Patient tolerance: patient tolerated the procedure well with no immediate complications               ED Course  ED Course as of 11/10/23 1514   Fri Nov 10, 2023   1136 Stable chronic left basal ganglia lacunar infarction. 1137 Will give 500ml of NS as patient's gfr is 19 and she got IV contrast.    1229 Patient's O2 dropped to 88% while sleeping, but then only rebounded to 90-91%. Concern for SPENCER as well as potential PE. Unfortuantely, due to patient's low GFR and having gotten IV contrast load for trauma scans, cannot do a CTA PE study at this time, so patient will require admission for further eval for vq scan. 1232 Creatinine(!): 2.60  Within baseline range. Medical Decision Making  Assessment and Plan:   Get ct head, cervical spine, chest/abdomen/pelvis to assess for ICH, blunt trauma. Check labs to assess for leukocytosis, anemia, electrolyte abnormalities, kidney and liver function; CXR to assess for PTX. Tetanus is UTD. Will require eyebrow laceration repair. Amount and/or Complexity of Data Reviewed  Labs: ordered. Decision-making details documented in ED Course. Radiology: ordered. Risk  OTC drugs. Prescription drug management. Decision regarding hospitalization.                 Disposition  [facility] Transfer: No  Final diagnoses:   Fall down stairs, initial encounter   Post-traumatic headache   Hypoxia   Eyebrow laceration, right, initial encounter   Traumatic ecchymosis of multiple sites     Time reflects when diagnosis was documented in both MDM as applicable and the Disposition within this note       Time User Action Codes Description Comment    11/10/2023 11:24 AM [place] [facility] Fall down stairs, initial encounter     11/10/2023 11:24 AM Renu Charles [D42.116] Post-traumatic headache     11/10/2023 12:23 PM Renu Charles [R09.02] Hypoxia     11/10/2023 12:23 PM [place], [address] Fall down stairs, initial encounter     11/10/2023 12:23 PM Kev Rudd Modify [R09.02] Hypoxia     11/10/2023  3:12 PM Kev Rudd Add [K92.485L] Eyebrow laceration, right, initial encounter     11/10/2023  3:12 PM Kev Rudd Add [T14. 8XXA] Traumatic ecchymosis of multiple sites           ED Disposition       ED Disposition   Admit    Condition   Stable    Date/Time   Fri Nov 10, 2023 12:23 PM    Comment   Case was discussed with hospitalist and the patient's admission status was agreed to be Admission Status: inpatient status to the service of Dr. Jeovany Ren . Follow-up Information    None       Patient's Medications   Discharge Prescriptions    No medications on file     No discharge procedures on file.     PDMP Review         Value Time User    PDMP Reviewed  Yes 10/31/2023 10:11 AM Gagandeep Rodas DO            ED Provider  Electronically Signed by           Kev Rudd DO  11/10/23 6333

## 2023-11-10 NOTE — ASSESSMENT & PLAN NOTE
History of bipolar disorder, eating disorder. Follows with psychiatry as outpatient  Continue Luvox 100 mg every morning, 200 mg at bedtime; Haldol 2 mg twice daily, Seroquel 300 mg a.m., Seroquel 500 mg at bedtime and Effexor 150 mg in the morning.

## 2023-11-10 NOTE — ASSESSMENT & PLAN NOTE
Currently stable. Continue home inhalers  Patient was noted to have oxygen saturation in the 88 while she was sleeping In the emergency department. She denied worsening shortness of breath, reported she snores. Most likely has SPENCER. Suspicion for PE or any other lung pathology. CT chest showed improvement in her prior lung opacities.

## 2023-11-10 NOTE — ASSESSMENT & PLAN NOTE
Blood pressure reviewed and acceptable.   Continue amiloride 5 mg twice daily, amlodipine 5 mg, carvedilol 25 twice daily

## 2023-11-10 NOTE — ASSESSMENT & PLAN NOTE
Patient reported she was walking on the stairs this morning with 2 cups of water and because she had no hands available to support herself she felt unsteady and fell forward she fell over 13 stairs. Mechanical fall. Denied lightheadedness, dizziness or palpitations. Patient had multiple bruises on presentation, face, hands, knees. CT head was negative for fractures, CT spine negative for fracture. X-ray negative for acute fracture so far. Patient reported bilateral knee pain that is worse than her usual.  We will check knees x-rays, knees slightly swollen. Patient denied severe pain currently. Continue patient's home tramadol. Tylenol as needed. PT/OT.

## 2023-11-10 NOTE — ASSESSMENT & PLAN NOTE
Lab Results   Component Value Date    EGFR 19 11/10/2023    EGFR 22 10/18/2023    EGFR 20 08/27/2023    CREATININE 2.60 (H) 11/10/2023    CREATININE 2.25 (H) 10/18/2023    CREATININE 2.51 (H) 08/27/2023     Follows with nephrology as outpatient  Currently creatinine at baseline. Monitor.

## 2023-11-10 NOTE — H&P
[address] [facility]  H&P  Name: Gael Song 66 y.o. female I MRN: [id number]  Unit/Bed#: -01 I Date of Admission: 11/10/2023   Date of Service: 11/10/2023 I Hospital Day: 0      Assessment/Plan   * Fall  Assessment & Plan  Patient reported she was walking on the stairs this morning with 2 cups of water and because she had no hands available to support herself she felt unsteady and fell forward she fell over 13 stairs. Mechanical fall. Denied lightheadedness, dizziness or palpitations. Patient had multiple bruises on presentation, face, hands, knees. CT head was negative for fractures, CT spine negative for fracture. X-ray negative for acute fracture so far. Patient reported bilateral knee pain that is worse than her usual.  We will check knees x-rays, knees slightly swollen. Patient denied severe pain currently. Continue patient's home tramadol. Tylenol as needed. PT/OT. Continuous opioid dependence ([address])  Assessment & Plan  Patient on tramadol 50 mg every 12 hours as needed for pain. PDMP reviewed    GERD (gastroesophageal reflux disease)  Assessment & Plan  Continue Protonix    CKD (chronic kidney disease) stage 4, GFR 15-29 ml/min [facility])  Assessment & Plan  Lab Results   Component Value Date    EGFR 19 11/10/2023    EGFR 22 10/18/2023    EGFR 20 08/27/2023    CREATININE 2.60 (H) 11/10/2023    CREATININE 2.25 (H) 10/18/2023    CREATININE 2.51 (H) 08/27/2023     Follows with nephrology as outpatient  Currently creatinine at baseline. Monitor. Moderate persistent asthma without complication  Assessment & Plan  Currently stable. Continue home inhalers  Patient was noted to have oxygen saturation in the 88 while she was sleeping In the emergency department. She denied worsening shortness of breath, reported she snores. Most likely has SPENCER. Suspicion for PE or any other lung pathology. CT chest showed improvement in her prior lung opacities.     Bipolar 1 disorder, depressed, severe ([address])  Assessment & Plan  History of bipolar disorder, eating disorder. Follows with psychiatry as outpatient  Continue Luvox 100 mg every morning, 200 mg at bedtime; Haldol 2 mg twice daily, Seroquel 300 mg a.m., Seroquel 500 mg at bedtime and Effexor 150 mg in the morning. Primary hypertension  Assessment & Plan  Blood pressure reviewed and acceptable. Continue amiloride 5 mg twice daily, amlodipine 5 mg, carvedilol 25 twice daily    Hypercholesteremia  Assessment & Plan  Continue statin       VTE Pharmacologic Prophylaxis: VTE Score: 7 High Risk (Score >/= 5) - Pharmacological DVT Prophylaxis Ordered: heparin. Sequential Compression Devices Ordered. Code Status: Level 1 - Full Code   Discussion with family: Patient declined call to . Anticipated Length of Stay: Patient will be admitted on an observation basis with an anticipated length of stay of less than 2 midnights secondary to fall. Total Time Spent on Date of Encounter in care of patient: 60 mins. This time was spent on one or more of the following: performing physical exam; counseling and coordination of care; obtaining or reviewing history; documenting in the medical record; reviewing/ordering tests, medications or procedures; communicating with other healthcare professionals and discussing with patient's family/caregivers. Chief Complaint: fall    History of Present Illness:  Mary Glover is a 66 y.o. female with a PMH of bipolar disorder, eating disorder, chronic kidney disease stage IV, chronic pain and continuous opioid dependence, GERD, who presents after she fell. She was walking on the stairs with 2 cups of water in her hands felt unsteady and fell over 13 stairs. Patient denied any symptoms prior to fall. Lives at home with family, she lives in the [facility]. Said she feels safe at home.   Patient was further admitted as observation    Review of Systems:  Review of Systems   Constitutional: Negative for chills and fever. Eyes:  Negative for visual disturbance. Respiratory:  Negative for choking, chest tightness and shortness of breath. Cardiovascular:  Negative for chest pain. Gastrointestinal:  Negative for constipation and diarrhea. Musculoskeletal:  Positive for arthralgias and joint swelling. Neurological:  Negative for dizziness, light-headedness and headaches. All other systems reviewed and are negative.       Past Medical and Surgical History:   Past Medical History:   Diagnosis Date    Anxiety     Anxiety disorder     Arthritis     At risk for falls     Bipolar 2 disorder ([address])     Chronic back pain     Chronic kidney disease     Closed fracture of distal end of right fibula with routine healing 2020    COVID-19     in 2021    CVA (cerebral vascular accident) [facility])     noted on MRI in the past    Depression     GERD (gastroesophageal reflux disease)     Hypercholesteremia     Hypernatremia     Hypertension     Hypokalemia     Idiopathic chronic pancreatitis ([address]) 2018    Intervertebral disc disorder with radiculopathy of lumbosacral region     resolved: 2015    Kidney disease     Kidney disease     Limb alert care status     LUE-fistula    Panic attacks     Pericardial effusion     PONV (postoperative nausea and vomiting)     Psychiatric problem     Radiculitis     resolved: 2015    Secondary renal hyperparathyroidism ([address])     Stroke ([address])     Vitamin D deficiency        Past Surgical History:   Procedure Laterality Date    BUNIONECTOMY      Left foot     CAST APPLICATION Right     Procedure: Application short-arm thumb spica splint;  Surgeon: Arnoldo Holly MD;  Location:  MAIN OR;  Service: Orthopedics    COLON SURGERY      COLONOSCOPY  2021    DILATION AND CURETTAGE OF UTERUS      INDUCED       surgically induced    MI ARTERIOVENOUS ANASTOMOSIS OPEN DIRECT Left 2019    Procedure: CREATION FISTULA ARTERIOVENOUS (AV) left wrists possible left upper;  Surgeon: Anu Mosqueda MD;  Location: [facility] MAIN OR;  Service: Vascular    SC [facility] W/SESMDC W/DIST Veldon Latus Left 7/1/2019    Procedure: Georgia Markie;  Surgeon: Ronnie Garcia DPM;  Location: [facility] MAIN OR;  Service: [address] W/SESMDC W/DIST Veldon Latus Right 8/3/2020    Procedure: Issa Adolfo;  Surgeon: Ronnie Garcia DPM;  Location: [facility] MAIN OR;  Service: Podiatry    SC [facility] W/SESMDC Bari Genta PHLNX OSTEOT Right 9/27/2021    Procedure: Carver Kee, right tameka osteotomy and 2nd claw toe correction;  Surgeon: Darlene Rivas MD;  Location: [facility] MAIN OR;  Service: Orthopedics    SC ERCP DX COLLECTION SPECIMEN BRUSHING/WASHING N/A 4/11/2018    Procedure: ENDOSCOPIC RETROGRADE CHOLANGIOPANCREATOGRAPHY (ERCP); Surgeon: Lori Ogden MD;  Location: [facility] MAIN OR;  Service: Gastroenterology    SC LAPAROSCOPY PROCTOPEXY PROLAPSE N/A 7/13/2018    Procedure: ROBOTIC SIGMOID RESECTION / RECTOPEXY;  Surgeon: Lenwood Mcardle, MD;  Location: [facility] MAIN OR;  Service: Colorectal    SC OPEN TREATMENT RADIAL SHAFT FRACTURE Right 10/11/2021    Procedure: OPEN REDUCTION W/ INTERNAL FIXATION (ORIF) RADIUS (WRIST), RIGHT DISTAL;  Surgeon: Daryle Drum, MD;  Location: [facility] MAIN OR;  Service: Orthopedics    SC REMOVAL IMPLANT DEEP Right 6/23/2022    Procedure: Removal of hardware volar aspect right distal radius (distal radial plate and screws);   Surgeon: Aida Haile MD;  Location: [facility] MAIN OR;  Service: Orthopedics    SC SIGMOIDOSCOPY FLX DX W/COLLJ SPEC BR/WA IF PFRMD N/A 7/13/2018    Procedure: Jennifereda Grout;  Surgeon: Lenwood Mcardle, MD;  Location: [facility] MAIN OR;  Service: Colorectal    SC TR TDN RESTORE INTRNSC FUNCJ ALL 4 FNGRS Right 6/23/2022    Procedure: Right ring finger flexor digitorum superficialis to flexor pollicis longus tendon transfer;  Surgeon: Aida Haile MD;  Location: [facility];  Service: Orthopedics    TUBAL LIGATION Bilateral     US GUIDED THYROID BIOPSY  2019       Meds/Allergies:  Prior to Admission medications    Medication Sig Start Date End Date Taking? Authorizing Provider   albuterol (Ventolin HFA) 90 mcg/act inhaler Inhale 2 puffs every 6 (six) hours as needed for wheezing or shortness of breath 23  Yes Bette Harp DO   AMILoride 5 mg tablet Take 1 tablet (5 mg total) by mouth 2 (two) times a day 23  Yes Bette Harp DO   amLODIPine (NORVASC) 5 mg tablet Take 1 tablet (5 mg total) by mouth daily 23  Yes Jonna Thornton, DO   aspirin 81 mg chewable tablet Chew 1 tablet (81 mg total) daily 21  Yes Arturo Mills, DO   budesonide-formoterol (Symbicort) 160-4.5 mcg/act inhaler Inhale 2 puffs 2 (two) times a day Rinse mouth after use. 8/10/23  Yes Katelyn Lepe, DO   carvedilol (COREG) 25 mg tablet Take 1 tablet (25 mg total) by mouth 2 (two) times a day with meals 23  Yes Bette Harp DO   cholecalciferol (VITAMIN D3) 1,000 units tablet Take 5 tablets (5,000 Units total) by mouth daily 22  Yes Bette Harp DO   clonazePAM (KlonoPIN) 0.5 mg tablet Take 1 tablet (0.5 mg total) by mouth 3 (three) times a day 10/26/23 1/24/24 Yes Bette Harp DO   Cyanocobalamin (VITAMIN B 12 PO) Take by mouth   Yes Historical Provider, MD   fluvoxaMINE (LUVOX) 100 mg tablet Fluvoxamine 100m tablet in the morning ; 2 tablets at night 23  Yes Esme Medina DO   haloperidol (HALDOL) 2 mg tablet Haloperidol 2m tablet po daily x 1wk.  Then 1 tablet in morning and 1 tablet at night 11/3/23  Yes Esme Medina DO   lamoTRIgine (LaMICtal) 200 MG tablet Lamotrigine 200m tablet in the morning and 1 tablet at night 11/3/23  Yes Esme Medina, DO   omeprazole (PriLOSEC) 40 MG capsule Take 1 capsule (40 mg total) by mouth daily before breakfast 23  Yes Bette Fly, DO   ondansetron (ZOFRAN) 4 mg tablet Take 1 tablet (4 mg total) by mouth every 8 (eight) hours as needed for nausea or vomiting 10/31/23  Yes Bette Harp DO   QUEtiapine (SEROquel) 100 mg tablet Quetiapine 100mg : 1 tablet + 400mg po at night 11/3/23  Yes Esme Mdeina DO   QUEtiapine (SEROquel) 300 mg tablet Quetiapine 300m tablet po  in morning 11/3/23  Yes Esme Medina DO   rosuvastatin (CRESTOR) 20 MG tablet TAKE 1 TABLET BY MOUTH IN  THE EVENING 4/3/23  Yes Rodrigo Villasenor MD   traMADol (Ultram) 50 mg tablet Take 1 tablet (50 mg total) by mouth every 12 (twelve) hours as needed for moderate pain 10/31/23  Yes Bette Harp DO   venlafaxine (EFFEXOR-XR) 150 mg 24 hr capsule Venlafaxine HCL ER 150m capsule po daily in the morning 11/3/23  Yes Esme Medina DO   acetaminophen (TYLENOL) 650 mg CR tablet Take 1 tablet (650 mg total) by mouth every 8 (eight) hours as needed for mild pain  Patient taking differently: Take 500 mg by mouth every 8 (eight) hours as needed for mild pain 22   Dago Hui PA-C   QUEtiapine (SEROquel) 400 MG tablet Quetiapine 400m tablet + 100mg tablet po at night  Patient not taking: Reported on 11/10/2023 11/3/23   Della Rojas DO     I have reviewed home medications with patient personally. Allergies:    Allergies   Allergen Reactions    Molds & Smuts Nasal Congestion    Bee Pollen Nasal Congestion     Other reaction(s): Nasal Congestion    Pollen Extract Nasal Congestion       Social History:  Marital Status:    Occupation:   Patient Pre-hospital Living Situation: Home  Patient Pre-hospital Level of Mobility: walks  Patient Pre-hospital Diet Restrictions: none  Substance Use History:   Social History     Substance and Sexual Activity   Alcohol Use Never     Social History     Tobacco Use   Smoking Status Never   Smokeless Tobacco Never     Social History     Substance and Sexual Activity   Drug Use Not Currently       Family History:  Family History   Problem Relation Age of Onset    Bipolar disorder Mother     Mental illness Mother         depression    Stroke Mother     Dementia Mother     Colon polyps Mother     Heart disease Father     Hypertension Father     Diabetes Father     Other Family         Back disorder    Diabetes Family     Heart disease Family     Hypertension Family     Stroke Family     Thyroid disease Family     Breast cancer Paternal Grandmother         age unknown    Breast cancer Paternal Aunt         age unknown    Breast cancer Maternal Aunt         age unknown    Mental illness Sister     Colon polyps Sister     Mental illness Sister     Heart disease Sister     No Known Problems Sister     Breast cancer Sister 76    Other Son         pituitary tumor    Hypertension Son     Obesity Son     No Known Problems Son     No Known Problems Maternal Grandmother     No Known Problems Maternal Grandfather     No Known Problems Paternal Grandfather     Breast cancer Paternal Aunt         age unknown    Substance Abuse Neg Hx         neg fam hx    Colon cancer Neg Hx        Physical Exam:     Vitals:   Blood Pressure: 148/72 (11/10/23 1527)  Pulse: 80 (11/10/23 1400)  Temperature: 97.7 °F (36.5 °C) (11/10/23 1527)  Temp Source: Temporal (11/10/23 1004)  Respirations: 16 (11/10/23 1527)  SpO2: 100 % (11/10/23 1400)    Physical Exam  Vitals and nursing note reviewed. Constitutional:       General: She is not in acute distress. Appearance: She is not diaphoretic. HENT:      Head: Normocephalic. Comments: Bruises on the forehead, open wound on the right eyebrow   Eyes:      General:         Right eye: No discharge. Left eye: No discharge. Cardiovascular:      Rate and Rhythm: Normal rate and regular rhythm. Heart sounds: Murmur heard. Pulmonary:      Effort: Pulmonary effort is normal. No respiratory distress. Breath sounds: Normal breath sounds. No wheezing, rhonchi or rales. Abdominal:      General: There is no distension. Palpations: Abdomen is soft. Tenderness: There is no abdominal tenderness.  There is no guarding or rebound. Musculoskeletal:      Cervical back: Normal range of motion. Right lower leg: No edema. Left lower leg: No edema. Skin:     General: Skin is warm. Findings: Bruising (forearms, knees) present. Neurological:      Mental Status: She is alert and oriented to person, place, and time. Psychiatric:         Mood and Affect: Mood normal.         Behavior: Behavior normal.         Thought Content: Thought content normal.         Judgment: Judgment normal.          Additional Data:     Lab Results:  Results from last 7 days   Lab Units 11/10/23  1014   WBC Thousand/uL 7.90   HEMOGLOBIN g/dL 12.0   HEMATOCRIT % 38.5   PLATELETS Thousands/uL 219   NEUTROS PCT % 77*   LYMPHS PCT % 15   MONOS PCT % 7   EOS PCT % 0     Results from last 7 days   Lab Units 11/10/23  1014   SODIUM mmol/L 140   POTASSIUM mmol/L 4.8   CHLORIDE mmol/L 107   CO2 mmol/L 25   BUN mg/dL 22   CREATININE mg/dL 2.60*   ANION GAP mmol/L 8   CALCIUM mg/dL 9.9   ALBUMIN g/dL 4.4   TOTAL BILIRUBIN mg/dL 0.37   ALK PHOS U/L 191*   ALT U/L 21   AST U/L 24   GLUCOSE RANDOM mg/dL 108                       Lines/Drains:  Invasive Devices       Peripheral Intravenous Line  Duration             Peripheral IV 11/10/23 Right Antecubital <1 day              Line  Duration             Hemodialysis AV Fistula 11/18/19 Left  1453 days                        Imaging: Reviewed radiology reports from this admission including: chest xray, chest CT scan, abdominal/pelvic CT, CT head, and xray(s)  TRAUMA - CT head wo contrast   Final Result by Nahum Banuelos MD (11/10 1051)      No acute intracranial abnormality. Chronic microangiopathic changes. Stable chronic left basal ganglia lacunar infarction. Workstation performed: [id number]         TRAUMA - CT spine cervical wo contrast   Final Result by Nahum Banuelos MD (11/10 1053)      No cervical spine fracture or traumatic malalignment.                   Workstation performed: [id number]         TRAUMA - CT chest abdomen pelvis w contrast   Final Result by Del Peguero MD (11/10 1101)   1. No evidence of acute posttraumatic injury throughout the chest, abdomen or pelvis. 2. Focal right upper lobe groundglass opacity noted on prior study has resolved during the interim. 3. High density dependent probable tablets within the cecum as also noted on prior examinations. Workstation performed: [id number]         XR Trauma chest portable   Final Result by Al Lennox, MD (11/10 1028)      No acute cardiopulmonary disease. Workstation performed: [id number]         XR knee 4+ vw left injury    (Results Pending)   XR knee 4+ vw right injury    (Results Pending)       EKG and Other Studies Reviewed on Admission:       ** Please Note: This note has been constructed using a voice recognition system.  **

## 2023-11-11 LAB
ANION GAP SERPL CALCULATED.3IONS-SCNC: 8 MMOL/L
BASOPHILS # BLD AUTO: 0.03 THOUSANDS/ÂΜL (ref 0–0.1)
BASOPHILS NFR BLD AUTO: 1 % (ref 0–1)
BUN SERPL-MCNC: 19 MG/DL (ref 5–25)
CALCIUM SERPL-MCNC: 9.3 MG/DL (ref 8.4–10.2)
CHLORIDE SERPL-SCNC: 109 MMOL/L (ref 96–108)
CO2 SERPL-SCNC: 22 MMOL/L (ref 21–32)
CREAT SERPL-MCNC: 2.64 MG/DL (ref 0.6–1.3)
EOSINOPHIL # BLD AUTO: 0.01 THOUSAND/ÂΜL (ref 0–0.61)
EOSINOPHIL NFR BLD AUTO: 0 % (ref 0–6)
ERYTHROCYTE [DISTWIDTH] IN BLOOD BY AUTOMATED COUNT: 13.3 % (ref 11.6–15.1)
GFR SERPL CREATININE-BSD FRML MDRD: 18 ML/MIN/1.73SQ M
GLUCOSE SERPL-MCNC: 96 MG/DL (ref 65–140)
HCT VFR BLD AUTO: 36.8 % (ref 34.8–46.1)
HGB BLD-MCNC: 11.4 G/DL (ref 11.5–15.4)
IMM GRANULOCYTES # BLD AUTO: 0.02 THOUSAND/UL (ref 0–0.2)
IMM GRANULOCYTES NFR BLD AUTO: 0 % (ref 0–2)
LYMPHOCYTES # BLD AUTO: 1.63 THOUSANDS/ÂΜL (ref 0.6–4.47)
LYMPHOCYTES NFR BLD AUTO: 29 % (ref 14–44)
MCH RBC QN AUTO: 31.1 PG (ref 26.8–34.3)
MCHC RBC AUTO-ENTMCNC: 31 G/DL (ref 31.4–37.4)
MCV RBC AUTO: 101 FL (ref 82–98)
MONOCYTES # BLD AUTO: 0.73 THOUSAND/ÂΜL (ref 0.17–1.22)
MONOCYTES NFR BLD AUTO: 13 % (ref 4–12)
NEUTROPHILS # BLD AUTO: 3.3 THOUSANDS/ÂΜL (ref 1.85–7.62)
NEUTS SEG NFR BLD AUTO: 57 % (ref 43–75)
NRBC BLD AUTO-RTO: 0 /100 WBCS
PLATELET # BLD AUTO: 183 THOUSANDS/UL (ref 149–390)
PMV BLD AUTO: 10.1 FL (ref 8.9–12.7)
POTASSIUM SERPL-SCNC: 4.5 MMOL/L (ref 3.5–5.3)
RBC # BLD AUTO: 3.66 MILLION/UL (ref 3.81–5.12)
SODIUM SERPL-SCNC: 139 MMOL/L (ref 135–147)
WBC # BLD AUTO: 5.72 THOUSAND/UL (ref 4.31–10.16)

## 2023-11-11 PROCEDURE — 99232 SBSQ HOSP IP/OBS MODERATE 35: CPT | Performed by: PHYSICIAN ASSISTANT

## 2023-11-11 PROCEDURE — 80048 BASIC METABOLIC PNL TOTAL CA: CPT | Performed by: INTERNAL MEDICINE

## 2023-11-11 PROCEDURE — 85025 COMPLETE CBC W/AUTO DIFF WBC: CPT | Performed by: INTERNAL MEDICINE

## 2023-11-11 RX ORDER — TRAMADOL HYDROCHLORIDE 50 MG/1
50 TABLET ORAL ONCE
Status: COMPLETED | OUTPATIENT
Start: 2023-11-11 | End: 2023-11-11

## 2023-11-11 RX ORDER — OXYCODONE HYDROCHLORIDE 5 MG/1
5 TABLET ORAL EVERY 8 HOURS PRN
Status: DISCONTINUED | OUTPATIENT
Start: 2023-11-11 | End: 2023-11-14 | Stop reason: HOSPADM

## 2023-11-11 RX ADMIN — TRAMADOL HYDROCHLORIDE 50 MG: 50 TABLET, COATED ORAL at 03:14

## 2023-11-11 RX ADMIN — BUDESONIDE AND FORMOTEROL FUMARATE DIHYDRATE 2 PUFF: 160; 4.5 AEROSOL RESPIRATORY (INHALATION) at 17:21

## 2023-11-11 RX ADMIN — CARVEDILOL 25 MG: 12.5 TABLET, FILM COATED ORAL at 16:42

## 2023-11-11 RX ADMIN — CLONAZEPAM 0.5 MG: 0.5 TABLET ORAL at 21:24

## 2023-11-11 RX ADMIN — HALOPERIDOL 2 MG: 2 TABLET ORAL at 21:24

## 2023-11-11 RX ADMIN — FLUVOXAMINE MALEATE 200 MG: 100 TABLET ORAL at 21:53

## 2023-11-11 RX ADMIN — AMILORIDE HYDROCLORIDE 5 MG: 5 TABLET ORAL at 08:31

## 2023-11-11 RX ADMIN — CLONAZEPAM 0.5 MG: 0.5 TABLET ORAL at 16:42

## 2023-11-11 RX ADMIN — OXYCODONE HYDROCHLORIDE 5 MG: 5 TABLET ORAL at 11:38

## 2023-11-11 RX ADMIN — CARVEDILOL 25 MG: 12.5 TABLET, FILM COATED ORAL at 08:32

## 2023-11-11 RX ADMIN — OXYCODONE HYDROCHLORIDE 5 MG: 5 TABLET ORAL at 20:23

## 2023-11-11 RX ADMIN — HEPARIN SODIUM 5000 UNITS: 5000 INJECTION INTRAVENOUS; SUBCUTANEOUS at 21:24

## 2023-11-11 RX ADMIN — ASPIRIN 81 MG CHEWABLE TABLET 81 MG: 81 TABLET CHEWABLE at 08:31

## 2023-11-11 RX ADMIN — PRAVASTATIN SODIUM 40 MG: 40 TABLET ORAL at 16:42

## 2023-11-11 RX ADMIN — LAMOTRIGINE 200 MG: 100 TABLET ORAL at 17:21

## 2023-11-11 RX ADMIN — HEPARIN SODIUM 5000 UNITS: 5000 INJECTION INTRAVENOUS; SUBCUTANEOUS at 08:34

## 2023-11-11 RX ADMIN — FLUVOXAMINE MALEATE 100 MG: 100 TABLET ORAL at 08:40

## 2023-11-11 RX ADMIN — QUETIAPINE FUMARATE 300 MG: 300 TABLET ORAL at 08:40

## 2023-11-11 RX ADMIN — Medication 5000 UNITS: at 08:33

## 2023-11-11 RX ADMIN — AMILORIDE HYDROCLORIDE 5 MG: 5 TABLET ORAL at 21:24

## 2023-11-11 RX ADMIN — HALOPERIDOL 2 MG: 2 TABLET ORAL at 08:34

## 2023-11-11 RX ADMIN — VENLAFAXINE HYDROCHLORIDE 150 MG: 37.5 CAPSULE, EXTENDED RELEASE ORAL at 08:35

## 2023-11-11 RX ADMIN — BUDESONIDE AND FORMOTEROL FUMARATE DIHYDRATE 2 PUFF: 160; 4.5 AEROSOL RESPIRATORY (INHALATION) at 08:32

## 2023-11-11 RX ADMIN — QUETIAPINE 500 MG: 200 TABLET ORAL at 21:53

## 2023-11-11 RX ADMIN — CLONAZEPAM 0.5 MG: 0.5 TABLET ORAL at 08:33

## 2023-11-11 RX ADMIN — AMLODIPINE BESYLATE 5 MG: 5 TABLET ORAL at 08:32

## 2023-11-11 RX ADMIN — LAMOTRIGINE 200 MG: 100 TABLET ORAL at 08:33

## 2023-11-11 NOTE — ASSESSMENT & PLAN NOTE
Lab Results   Component Value Date    EGFR 18 11/11/2023    EGFR 19 11/10/2023    EGFR 22 10/18/2023    CREATININE 2.64 (H) 11/11/2023    CREATININE 2.60 (H) 11/10/2023    CREATININE 2.25 (H) 10/18/2023     Follows with nephrology as outpatient  Currently creatinine at baseline.   Trend BMP

## 2023-11-11 NOTE — ASSESSMENT & PLAN NOTE
Currently stable. Continue home inhalers  Does not appear to be in acute exacerbation  Patient was noted to have oxygen saturation in the 88 while she was sleeping In the emergency department. She denied worsening shortness of breath, reported she snores. Most likely has SPENCER.   Weaned to RA

## 2023-11-11 NOTE — PLAN OF CARE
Problem: MOBILITY - ADULT  Goal: Maintain or return to baseline ADL function  Description: INTERVENTIONS:  -  Assess patient's ability to carry out ADLs; assess patient's baseline for ADL function and identify physical deficits which impact ability to perform ADLs (bathing, care of mouth/teeth, toileting, grooming, dressing, etc.)  - Assess/evaluate cause of self-care deficits   - Assess range of motion  - Assess patient's mobility; develop plan if impaired  - Assess patient's need for assistive devices and provide as appropriate  - Encourage maximum independence but intervene and supervise when necessary  - Involve family in performance of ADLs  - Assess for home care needs following discharge   - Consider OT consult to assist with ADL evaluation and planning for discharge  - Provide patient education as appropriate  Outcome: Progressing  Goal: Maintains/Returns to pre admission functional level  Description: INTERVENTIONS:  - Perform BMAT or MOVE assessment daily.   - Set and communicate daily mobility goal to care team and patient/family/caregiver. - Collaborate with rehabilitation services on mobility goals if consulted  - Perform Range of Motion 3 times a day. - Reposition patient every 3 hours.   - Dangle patient 3 times a day  - Stand patient 3 times a day  - Ambulate patient 3 times a day  - Out of bed to chair 3 times a day   - Out of bed for meals 3 times a day  - Out of bed for toileting  - Record patient progress and toleration of activity level   Outcome: Progressing     Problem: PAIN - ADULT  Goal: Verbalizes/displays adequate comfort level or baseline comfort level  Description: Interventions:  - Encourage patient to monitor pain and request assistance  - Assess pain using appropriate pain scale  - Administer analgesics based on type and severity of pain and evaluate response  - Implement non-pharmacological measures as appropriate and evaluate response  - Consider cultural and social influences on pain and pain management  - Notify physician/advanced practitioner if interventions unsuccessful or patient reports new pain  Outcome: Progressing     Problem: INFECTION - ADULT  Goal: Absence or prevention of progression during hospitalization  Description: INTERVENTIONS:  - Assess and monitor for signs and symptoms of infection  - Monitor lab/diagnostic results  - Monitor all insertion sites, i.e. indwelling lines, tubes, and drains  - Monitor endotracheal if appropriate and nasal secretions for changes in amount and color  - [place] appropriate cooling/warming therapies per order  - Administer medications as ordered  - Instruct and encourage patient and family to use good hand hygiene technique  - Identify and instruct in appropriate isolation precautions for identified infection/condition  Outcome: Progressing  Goal: Absence of fever/infection during neutropenic period  Description: INTERVENTIONS:  - Monitor WBC    Outcome: Progressing     Problem: SAFETY ADULT  Goal: Maintain or return to baseline ADL function  Description: INTERVENTIONS:  -  Assess patient's ability to carry out ADLs; assess patient's baseline for ADL function and identify physical deficits which impact ability to perform ADLs (bathing, care of mouth/teeth, toileting, grooming, dressing, etc.)  - Assess/evaluate cause of self-care deficits   - Assess range of motion  - Assess patient's mobility; develop plan if impaired  - Assess patient's need for assistive devices and provide as appropriate  - Encourage maximum independence but intervene and supervise when necessary  - Involve family in performance of ADLs  - Assess for home care needs following discharge   - Consider OT consult to assist with ADL evaluation and planning for discharge  - Provide patient education as appropriate  Outcome: Progressing  Goal: Maintains/Returns to pre admission functional level  Description: INTERVENTIONS:  - Perform BMAT or MOVE assessment daily.   - Set and communicate daily mobility goal to care team and patient/family/caregiver. - Collaborate with rehabilitation services on mobility goals if consulted  - Perform Range of Motion 3 times a day. - Reposition patient every 3 hours.   - Dangle patient 3 times a day  - Stand patient 3 times a day  - Ambulate patient 3 times a day  - Out of bed to chair 3 times a day   - Out of bed for meals 3 times a day  - Out of bed for toileting  - Record patient progress and toleration of activity level   Outcome: Progressing  Goal: Patient will remain free of falls  Description: INTERVENTIONS:  - Educate patient/family on patient safety including physical limitations  - Instruct patient to call for assistance with activity   - Consult OT/PT to assist with strengthening/mobility   - Keep Call bell within reach  - Keep bed low and locked with side rails adjusted as appropriate  - Keep care items and personal belongings within reach  - Initiate and maintain comfort rounds  - Make Fall Risk Sign visible to staff  - Offer Toileting every 2 Hours, in advance of need  - Initiate/Maintain bed alarm  - Obtain necessary fall risk management equipment: socks  - Apply yellow socks and bracelet for high fall risk patients  - Consider moving patient to room near nurses station  Outcome: Progressing     Problem: DISCHARGE PLANNING  Goal: Discharge to home or other facility with appropriate resources  Description: INTERVENTIONS:  - Identify barriers to discharge w/patient and caregiver  - Arrange for needed discharge resources and transportation as appropriate  - Identify discharge learning needs (meds, wound care, etc.)  - Arrange for interpretive services to assist at discharge as needed  - Refer to Case Management Department for coordinating discharge planning if the patient needs post-hospital services based on physician/advanced practitioner order or complex needs related to functional status, cognitive ability, or social support system  Outcome: Progressing     Problem: Knowledge Deficit  Goal: Patient/family/caregiver demonstrates understanding of disease process, treatment plan, medications, and discharge instructions  Description: Complete learning assessment and assess knowledge base.   Interventions:  - Provide teaching at level of understanding  - Provide teaching via preferred learning methods  Outcome: Progressing

## 2023-11-11 NOTE — OCCUPATIONAL THERAPY NOTE
Occupational Therapy Cx Note     Patient Name: Jazmin Wells  [id number] Date: 11/11/2023  Problem List  Principal Problem:    Fall  Active Problems:    Hypercholesteremia    Primary hypertension    Bipolar 1 disorder, depressed, severe (HCC)    Moderate persistent asthma without complication    CKD (chronic kidney disease) stage 4, GFR 15-29 ml/min ([facility])    GERD (gastroesophageal reflux disease)    Continuous opioid dependence ([address])            11/11/23 0945   OT Last Visit   OT Visit Date 11/11/23   Note Type   Note type Cancelled Session   Additional Comments OT orders received. Chart reviewed. Pt presents to [facility] s/p fall down flight of steps. Pending reading of b/l knee xrays. Will hold and follow as able and appropriate.               Jaden Cedillo, OTR/L

## 2023-11-11 NOTE — ASSESSMENT & PLAN NOTE
Patient reported she was walking on the stairs this morning with 2 cups of water and because she had no hands available to support herself she felt unsteady and fell forward she fell over 13 stairs. Mechanical fall. Denied lightheadedness, dizziness or palpitations. Patient had multiple bruises on presentation, face, hands, knees. CT head was negative for fractures, CT spine negative for fracture. Patient reported bilateral knee pain that is worse than her usual - bilateral knee x-rays pending  PT/OT evaluation for disposition planning. Pt reports multiple falls.

## 2023-11-11 NOTE — PLAN OF CARE
Problem: MOBILITY - ADULT  Goal: Maintain or return to baseline ADL function  Description: INTERVENTIONS:  -  Assess patient's ability to carry out ADLs; assess patient's baseline for ADL function and identify physical deficits which impact ability to perform ADLs (bathing, care of mouth/teeth, toileting, grooming, dressing, etc.)  - Assess/evaluate cause of self-care deficits   - Assess range of motion  - Assess patient's mobility; develop plan if impaired  - Assess patient's need for assistive devices and provide as appropriate  - Encourage maximum independence but intervene and supervise when necessary  - Involve family in performance of ADLs  - Assess for home care needs following discharge   - Consider OT consult to assist with ADL evaluation and planning for discharge  - Provide patient education as appropriate  11/11/2023 0024 by Kenton Silver RN  Outcome: Progressing  11/11/2023 0023 by Kenton Silver RN  Outcome: Progressing  Goal: Maintains/Returns to pre admission functional level  Description: INTERVENTIONS:  - Perform BMAT or MOVE assessment daily.   - Set and communicate daily mobility goal to care team and patient/family/caregiver. - Collaborate with rehabilitation services on mobility goals if consulted  - Perform Range of Motion x times a day. - Reposition patient every x hours.   - Dangle patient x times a day  - Stand patient x times a day  - Ambulate patient x times a day  - Out of bed to chair x times a day   - Out of bed for meals x times a day  - Out of bed for toileting  - Record patient progress and toleration of activity level   11/11/2023 0024 by Kenton Silver RN  Outcome: Progressing  11/11/2023 0023 by Kenton Silver RN  Outcome: Progressing     Problem: PAIN - ADULT  Goal: Verbalizes/displays adequate comfort level or baseline comfort level  Description: Interventions:  - Encourage patient to monitor pain and request assistance  - Assess pain using appropriate pain scale  - Administer analgesics based on type and severity of pain and evaluate response  - Implement non-pharmacological measures as appropriate and evaluate response  - Consider cultural and social influences on pain and pain management  - Notify physician/advanced practitioner if interventions unsuccessful or patient reports new pain  Outcome: Progressing     Problem: INFECTION - ADULT  Goal: Absence or prevention of progression during hospitalization  Description: INTERVENTIONS:  - Assess and monitor for signs and symptoms of infection  - Monitor lab/diagnostic results  - Monitor all insertion sites, i.e. indwelling lines, tubes, and drains  - Monitor endotracheal if appropriate and nasal secretions for changes in amount and color  - [place] appropriate cooling/warming therapies per order  - Administer medications as ordered  - Instruct and encourage patient and family to use good hand hygiene technique  - Identify and instruct in appropriate isolation precautions for identified infection/condition  Outcome: Progressing  Goal: Absence of fever/infection during neutropenic period  Description: INTERVENTIONS:  - Monitor WBC    Outcome: Progressing     Problem: SAFETY ADULT  Goal: Maintain or return to baseline ADL function  Description: INTERVENTIONS:  -  Assess patient's ability to carry out ADLs; assess patient's baseline for ADL function and identify physical deficits which impact ability to perform ADLs (bathing, care of mouth/teeth, toileting, grooming, dressing, etc.)  - Assess/evaluate cause of self-care deficits   - Assess range of motion  - Assess patient's mobility; develop plan if impaired  - Assess patient's need for assistive devices and provide as appropriate  - Encourage maximum independence but intervene and supervise when necessary  - Involve family in performance of ADLs  - Assess for home care needs following discharge   - Consider OT consult to assist with ADL evaluation and planning for discharge  - Provide patient education as appropriate  11/11/2023 0024 by Gayathri Michaels RN  Outcome: Progressing  11/11/2023 0023 by Gayathri Michaels RN  Outcome: Progressing  Goal: Maintains/Returns to pre admission functional level  Description: INTERVENTIONS:  - Perform BMAT or MOVE assessment daily.   - Set and communicate daily mobility goal to care team and patient/family/caregiver. - Collaborate with rehabilitation services on mobility goals if consulted  - Perform Range of Motion x times a day. - Reposition patient every x hours.   - Dangle patient x times a day  - Stand patient x times a day  - Ambulate patient x times a day  - Out of bed to chair x times a day   - Out of bed for meals x times a day  - Out of bed for toileting  - Record patient progress and toleration of activity level   11/11/2023 0024 by Gayathri Michaels RN  Outcome: Progressing  11/11/2023 0023 by Gayathri Michaels RN  Outcome: Progressing  Goal: Patient will remain free of falls  Description: INTERVENTIONS:  - Educate patient/family on patient safety including physical limitations  - Instruct patient to call for assistance with activity   - Consult OT/PT to assist with strengthening/mobility   - Keep Call bell within reach  - Keep bed low and locked with side rails adjusted as appropriate  - Keep care items and personal belongings within reach  - Initiate and maintain comfort rounds  - Make Fall Risk Sign visible to staff  - Offer Toileting every x Hours, in advance of need  - Initiate/Maintain alarm  - Obtain necessary fall risk management equipment: x  - Apply yellow socks and bracelet for high fall risk patients  - Consider moving patient to room near nurses station  Outcome: Progressing     Problem: DISCHARGE PLANNING  Goal: Discharge to home or other facility with appropriate resources  Description: INTERVENTIONS:  - Identify barriers to discharge w/patient and caregiver  - Arrange for needed discharge resources and transportation as appropriate  - Identify discharge learning needs (meds, wound care, etc.)  - Arrange for interpretive services to assist at discharge as needed  - Refer to Case Management Department for coordinating discharge planning if the patient needs post-hospital services based on physician/advanced practitioner order or complex needs related to functional status, cognitive ability, or social support system  Outcome: Progressing     Problem: Knowledge Deficit  Goal: Patient/family/caregiver demonstrates understanding of disease process, treatment plan, medications, and discharge instructions  Description: Complete learning assessment and assess knowledge base.   Interventions:  - Provide teaching at level of understanding  - Provide teaching via preferred learning methods  Outcome: Progressing

## 2023-11-11 NOTE — PROGRESS NOTES
[id number]  Progress Note  Name: Jb Mcdaniel  MRN: [id number]  Unit/Bed#: -59 I Date of Admission: 11/10/2023   Date of Service: 11/11/2023 I Hospital Day: 0    Assessment/Plan   * Fall  Assessment & Plan  Patient reported she was walking on the stairs this morning with 2 cups of water and because she had no hands available to support herself she felt unsteady and fell forward she fell over 13 stairs. Mechanical fall. Denied lightheadedness, dizziness or palpitations. Patient had multiple bruises on presentation, face, hands, knees. CT head was negative for fractures, CT spine negative for fracture. Patient reported bilateral knee pain that is worse than her usual - bilateral knee x-rays pending  PT/OT evaluation for disposition planning. Pt reports multiple falls. Continuous opioid dependence ([address])  Assessment & Plan  Patient on tramadol 50 mg every 12 hours as needed for pain. PDMP reviewed on admit    GERD (gastroesophageal reflux disease)  Assessment & Plan  Continue Protonix    CKD (chronic kidney disease) stage 4, GFR 15-29 ml/min [facility])  Assessment & Plan  Lab Results   Component Value Date    EGFR 18 11/11/2023    EGFR 19 11/10/2023    EGFR 22 10/18/2023    CREATININE 2.64 (H) 11/11/2023    CREATININE 2.60 (H) 11/10/2023    CREATININE 2.25 (H) 10/18/2023     Follows with nephrology as outpatient  Currently creatinine at baseline. Trend BMP    Moderate persistent asthma without complication  Assessment & Plan  Currently stable. Continue home inhalers  Does not appear to be in acute exacerbation  Patient was noted to have oxygen saturation in the 88 while she was sleeping In the emergency department. She denied worsening shortness of breath, reported she snores. Most likely has SPENCER. Weaned to RA    Bipolar 1 disorder, depressed, severe ([address])  Assessment & Plan  History of bipolar disorder, eating disorder.   Follows with psychiatry as outpatient  Continue Luvox 100 mg every morning, 200 mg at bedtime; Haldol 2 mg twice daily, Seroquel 300 mg a.m., Seroquel 500 mg at bedtime and Effexor 150 mg in the morning. Hypercholesteremia  Assessment & Plan  Continue statin         VTE Pharmacologic Prophylaxis: VTE Score: 7 High Risk (Score >/= 5) - Pharmacological DVT Prophylaxis Ordered: heparin. Sequential Compression Devices Ordered. Patient Centered Rounds: I performed bedside rounds with nursing staff today. Discussions with Specialists or Other Care Team Provider: RAMSES    Education and Discussions with Family / Patient: Patient declined call to . Total Time Spent on Date of Encounter in care of patient: 40 mins. This time was spent on one or more of the following: performing physical exam; counseling and coordination of care; obtaining or reviewing history; documenting in the medical record; reviewing/ordering tests, medications or procedures; communicating with other healthcare professionals and discussing with patient's family/caregivers. Current Length of Stay: 0 day(s)  Current Patient Status: Observation   Certification Statement: The patient will continue to require additional inpatient hospital stay due to PT/OT eval  Discharge Plan: Anticipate discharge in 24-48 hrs to discharge location to be determined pending rehab evaluations. Code Status: Level 1 - Full Code    Subjective:   Patient overall feels generally sore this morning. Still with bilateral knee pain. Denies chest pain/palpitations, shortness of breath, nausea/vomiting or abdominal pain. Objective:     Vitals:   Temp (24hrs), Av.9 °F (36.6 °C), Min:97.7 °F (36.5 °C), Max:98.1 °F (36.7 °C)    Temp:  [97.7 °F (36.5 °C)-98.1 °F (36.7 °C)] 98.1 °F (36.7 °C)  HR:  [75-81] 81  Resp:  [12-18] 18  BP: (138-173)/(67-92) 169/78  SpO2:  [91 %-100 %] 94 %  There is no height or weight on file to calculate BMI.      Input and Output Summary (last 24 hours): Intake/Output Summary (Last 24 hours) at 11/11/2023 1155  Last data filed at 11/11/2023 0827  Gross per 24 hour   Intake 660 ml   Output --   Net 660 ml       Physical Exam:   Physical Exam  Vitals and nursing note reviewed. Constitutional:       Appearance: Normal appearance. Comments: No acute distress   HENT:      Head: Normocephalic. Eyes:      General: No scleral icterus. Extraocular Movements: Extraocular movements intact. Conjunctiva/sclera: Conjunctivae normal.   Cardiovascular:      Rate and Rhythm: Normal rate and regular rhythm. Heart sounds: S1 normal and S2 normal.   Pulmonary:      Effort: Pulmonary effort is normal.      Breath sounds: Normal breath sounds. No wheezing, rhonchi or rales. Abdominal:      General: Bowel sounds are normal.      Palpations: Abdomen is soft. Tenderness: There is no abdominal tenderness. There is no guarding or rebound. Musculoskeletal:         General: No swelling, tenderness or deformity. Cervical back: Normal range of motion. Comments: Able to move upper/lower ext bilaterally. Bilateral knee superficial abrasions. Minimal swelling and ecchymosis, ROM WNL   Skin:     General: Skin is warm and dry. Neurological:      Mental Status: She is alert and oriented to person, place, and time.    Psychiatric:         Mood and Affect: Mood normal.         Speech: Speech normal.         Behavior: Behavior normal.          Additional Data:     Labs:  Results from last 7 days   Lab Units 11/11/23  0444   WBC Thousand/uL 5.72   HEMOGLOBIN g/dL 11.4*   HEMATOCRIT % 36.8   PLATELETS Thousands/uL 183   NEUTROS PCT % 57   LYMPHS PCT % 29   MONOS PCT % 13*   EOS PCT % 0     Results from last 7 days   Lab Units 11/11/23  0444 11/10/23  1014   SODIUM mmol/L 139 140   POTASSIUM mmol/L 4.5 4.8   CHLORIDE mmol/L 109* 107   CO2 mmol/L 22 25   BUN mg/dL 19 22   CREATININE mg/dL 2.64* 2.60*   ANION GAP mmol/L 8 8   CALCIUM mg/dL 9.3 9.9   ALBUMIN g/dL  --  4.4   TOTAL BILIRUBIN mg/dL  --  0.37   ALK PHOS U/L  --  191*   ALT U/L  --  21   AST U/L  --  24   GLUCOSE RANDOM mg/dL 96 108                       Lines/Drains:  Invasive Devices       Peripheral Intravenous Line  Duration             Peripheral IV 11/10/23 Right Antecubital 1 day              Line  Duration             Hemodialysis AV Fistula 11/18/19 Left  1454 days    Hemodialysis AV Fistula 11/10/23 Left Forearm <1 day                          Imaging: Reviewed radiology reports from this admission including: xray(s)    Recent Cultures (last 7 days):         Last 24 Hours Medication List:   Current Facility-Administered Medications   Medication Dose Route Frequency Provider Last Rate    acetaminophen  650 mg Oral Q6H PRN Leanna Sawyer MD      albuterol  2 puff Inhalation Q6H PRN Leanna Sawyer MD      AMILoride  5 mg Oral BID Leanna Sawyer MD      amLODIPine  5 mg Oral Daily Leanna Sawyer MD      aspirin  81 mg Oral Daily Leanna Sawyer MD      budesonide-formoterol  2 puff Inhalation BID Leanna Sawyer MD      carvedilol  25 mg Oral BID With Meals Leanna Sawyer MD      cholecalciferol  5,000 Units Oral Daily Leanna Sawyer MD      clonazePAM  0.5 mg Oral TID Leanna Sawyer MD      fluvoxaMINE  100 mg Oral QAM Leanna Sawyer MD      fluvoxaMINE  200 mg Oral HS Leanna Sawyer MD      haloperidol  2 mg Oral BID Leanna Sawyer MD      heparin (porcine)  5,000 Units Subcutaneous Q12H [address] Leanna Sawyer MD      lamoTRIgine  200 mg Oral BID Leanna Sawyer MD      oxyCODONE  5 mg Oral Q8H PRN Kelsea Perez PA-C      pantoprazole  40 mg Oral Early Morning Leanna Sawyer MD      pravastatin  40 mg Oral Daily With Nikko Mcdonald MD      QUEtiapine  300 mg Oral QAM Leanna Sawyer MD      QUEtiapine  500 mg Oral HS Leanna Sawyer MD      traMADol  50 mg Oral BID PRN Leanna Sawyer MD      venlafaxine  150 mg Oral JOSELITO Silver MD          Today, Patient Was Seen By: Queen Nehemiah PA-C    **Please Note: This note may have been constructed using a voice recognition system. **

## 2023-11-12 PROBLEM — F39 MOOD DISORDER (HCC): Status: ACTIVE | Noted: 2019-01-23
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
## 2023-11-12 LAB
ANION GAP SERPL CALCULATED.3IONS-SCNC: 6 MMOL/L
BASOPHILS # BLD AUTO: 0.03 THOUSANDS/ÂΜL (ref 0–0.1)
BASOPHILS NFR BLD AUTO: 1 % (ref 0–1)
BUN SERPL-MCNC: 26 MG/DL (ref 5–25)
CALCIUM SERPL-MCNC: 9.1 MG/DL (ref 8.4–10.2)
CHLORIDE SERPL-SCNC: 110 MMOL/L (ref 96–108)
CO2 SERPL-SCNC: 22 MMOL/L (ref 21–32)
CREAT SERPL-MCNC: 2.82 MG/DL (ref 0.6–1.3)
EOSINOPHIL # BLD AUTO: 0 THOUSAND/ÂΜL (ref 0–0.61)
EOSINOPHIL NFR BLD AUTO: 0 % (ref 0–6)
ERYTHROCYTE [DISTWIDTH] IN BLOOD BY AUTOMATED COUNT: 13.3 % (ref 11.6–15.1)
GFR SERPL CREATININE-BSD FRML MDRD: 17 ML/MIN/1.73SQ M
GLUCOSE SERPL-MCNC: 92 MG/DL (ref 65–140)
HCT VFR BLD AUTO: 34.2 % (ref 34.8–46.1)
HGB BLD-MCNC: 10.4 G/DL (ref 11.5–15.4)
IMM GRANULOCYTES # BLD AUTO: 0.02 THOUSAND/UL (ref 0–0.2)
IMM GRANULOCYTES NFR BLD AUTO: 0 % (ref 0–2)
LYMPHOCYTES # BLD AUTO: 1.53 THOUSANDS/ÂΜL (ref 0.6–4.47)
LYMPHOCYTES NFR BLD AUTO: 29 % (ref 14–44)
MCH RBC QN AUTO: 30.9 PG (ref 26.8–34.3)
MCHC RBC AUTO-ENTMCNC: 30.4 G/DL (ref 31.4–37.4)
MCV RBC AUTO: 102 FL (ref 82–98)
MONOCYTES # BLD AUTO: 0.55 THOUSAND/ÂΜL (ref 0.17–1.22)
MONOCYTES NFR BLD AUTO: 10 % (ref 4–12)
NEUTROPHILS # BLD AUTO: 3.21 THOUSANDS/ÂΜL (ref 1.85–7.62)
NEUTS SEG NFR BLD AUTO: 60 % (ref 43–75)
NRBC BLD AUTO-RTO: 0 /100 WBCS
PLATELET # BLD AUTO: 192 THOUSANDS/UL (ref 149–390)
PMV BLD AUTO: 10.4 FL (ref 8.9–12.7)
POTASSIUM SERPL-SCNC: 4.7 MMOL/L (ref 3.5–5.3)
RBC # BLD AUTO: 3.37 MILLION/UL (ref 3.81–5.12)
SODIUM SERPL-SCNC: 138 MMOL/L (ref 135–147)
WBC # BLD AUTO: 5.34 THOUSAND/UL (ref 4.31–10.16)

## 2023-11-12 PROCEDURE — 99232 SBSQ HOSP IP/OBS MODERATE 35: CPT | Performed by: INTERNAL MEDICINE

## 2023-11-12 PROCEDURE — 85025 COMPLETE CBC W/AUTO DIFF WBC: CPT | Performed by: INTERNAL MEDICINE

## 2023-11-12 PROCEDURE — 80048 BASIC METABOLIC PNL TOTAL CA: CPT | Performed by: INTERNAL MEDICINE

## 2023-11-12 RX ADMIN — QUETIAPINE 500 MG: 200 TABLET ORAL at 22:11

## 2023-11-12 RX ADMIN — Medication 5000 UNITS: at 09:13

## 2023-11-12 RX ADMIN — LAMOTRIGINE 200 MG: 100 TABLET ORAL at 09:15

## 2023-11-12 RX ADMIN — BUDESONIDE AND FORMOTEROL FUMARATE DIHYDRATE 2 PUFF: 160; 4.5 AEROSOL RESPIRATORY (INHALATION) at 17:19

## 2023-11-12 RX ADMIN — CLONAZEPAM 0.5 MG: 0.5 TABLET ORAL at 21:30

## 2023-11-12 RX ADMIN — OXYCODONE HYDROCHLORIDE 5 MG: 5 TABLET ORAL at 12:34

## 2023-11-12 RX ADMIN — BUDESONIDE AND FORMOTEROL FUMARATE DIHYDRATE 2 PUFF: 160; 4.5 AEROSOL RESPIRATORY (INHALATION) at 09:14

## 2023-11-12 RX ADMIN — FLUVOXAMINE MALEATE 200 MG: 100 TABLET ORAL at 22:11

## 2023-11-12 RX ADMIN — TRAMADOL HYDROCHLORIDE 50 MG: 50 TABLET, COATED ORAL at 22:12

## 2023-11-12 RX ADMIN — AMILORIDE HYDROCLORIDE 5 MG: 5 TABLET ORAL at 21:30

## 2023-11-12 RX ADMIN — AMLODIPINE BESYLATE 5 MG: 5 TABLET ORAL at 09:13

## 2023-11-12 RX ADMIN — CLONAZEPAM 0.5 MG: 0.5 TABLET ORAL at 09:14

## 2023-11-12 RX ADMIN — HALOPERIDOL 2 MG: 2 TABLET ORAL at 09:14

## 2023-11-12 RX ADMIN — HALOPERIDOL 2 MG: 2 TABLET ORAL at 21:30

## 2023-11-12 RX ADMIN — CARVEDILOL 25 MG: 12.5 TABLET, FILM COATED ORAL at 09:12

## 2023-11-12 RX ADMIN — PANTOPRAZOLE SODIUM 40 MG: 40 TABLET, DELAYED RELEASE ORAL at 05:16

## 2023-11-12 RX ADMIN — CLONAZEPAM 0.5 MG: 0.5 TABLET ORAL at 17:19

## 2023-11-12 RX ADMIN — QUETIAPINE FUMARATE 300 MG: 300 TABLET ORAL at 09:14

## 2023-11-12 RX ADMIN — LAMOTRIGINE 200 MG: 100 TABLET ORAL at 17:19

## 2023-11-12 RX ADMIN — CARVEDILOL 25 MG: 12.5 TABLET, FILM COATED ORAL at 17:18

## 2023-11-12 RX ADMIN — AMILORIDE HYDROCLORIDE 5 MG: 5 TABLET ORAL at 09:12

## 2023-11-12 RX ADMIN — VENLAFAXINE HYDROCHLORIDE 150 MG: 37.5 CAPSULE, EXTENDED RELEASE ORAL at 09:15

## 2023-11-12 RX ADMIN — FLUVOXAMINE MALEATE 100 MG: 100 TABLET ORAL at 09:16

## 2023-11-12 RX ADMIN — ASPIRIN 81 MG CHEWABLE TABLET 81 MG: 81 TABLET CHEWABLE at 09:12

## 2023-11-12 RX ADMIN — PRAVASTATIN SODIUM 40 MG: 40 TABLET ORAL at 17:20

## 2023-11-12 RX ADMIN — HEPARIN SODIUM 5000 UNITS: 5000 INJECTION INTRAVENOUS; SUBCUTANEOUS at 21:30

## 2023-11-12 RX ADMIN — HEPARIN SODIUM 5000 UNITS: 5000 INJECTION INTRAVENOUS; SUBCUTANEOUS at 09:16

## 2023-11-12 NOTE — ASSESSMENT & PLAN NOTE
Bipolar depression mood stable  Continue fluvoxamine haloperidol quetiapine venlafaxine clonazepam and lamictal

## 2023-11-12 NOTE — ASSESSMENT & PLAN NOTE
History of mood disorder and hypertension was holding water going up steps and fell down 13 wooden steps  Patient denies any precipitating factors. Has been falling a lot. Plain films without fracture. At baseline lives with sister. PT/OT to evaluate for discharge needs.

## 2023-11-12 NOTE — PLAN OF CARE
Problem: MOBILITY - ADULT  Goal: Maintain or return to baseline ADL function  Description: INTERVENTIONS:  -  Assess patient's ability to carry out ADLs; assess patient's baseline for ADL function and identify physical deficits which impact ability to perform ADLs (bathing, care of mouth/teeth, toileting, grooming, dressing, etc.)  - Assess/evaluate cause of self-care deficits   - Assess range of motion  - Assess patient's mobility; develop plan if impaired  - Assess patient's need for assistive devices and provide as appropriate  - Encourage maximum independence but intervene and supervise when necessary  - Involve family in performance of ADLs  - Assess for home care needs following discharge   - Consider OT consult to assist with ADL evaluation and planning for discharge  - Provide patient education as appropriate  Outcome: Progressing  Goal: Maintains/Returns to pre admission functional level  Description: INTERVENTIONS:  - Perform BMAT or MOVE assessment daily.   - Set and communicate daily mobility goal to care team and patient/family/caregiver. - Collaborate with rehabilitation services on mobility goals if consulted  - Perform Range of Motion 3 times a day. - Reposition patient every 3 hours.   - Dangle patient 3 times a day  - Stand patient 3 times a day  - Ambulate patient 3 times a day  - Out of bed to chair 3 times a day   - Out of bed for meals 3 times a day  - Out of bed for toileting  - Record patient progress and toleration of activity level   Outcome: Progressing     Problem: PAIN - ADULT  Goal: Verbalizes/displays adequate comfort level or baseline comfort level  Description: Interventions:  - Encourage patient to monitor pain and request assistance  - Assess pain using appropriate pain scale  - Administer analgesics based on type and severity of pain and evaluate response  - Implement non-pharmacological measures as appropriate and evaluate response  - Consider cultural and social influences on pain and pain management  - Notify physician/advanced practitioner if interventions unsuccessful or patient reports new pain  Outcome: Progressing     Problem: INFECTION - ADULT  Goal: Absence or prevention of progression during hospitalization  Description: INTERVENTIONS:  - Assess and monitor for signs and symptoms of infection  - Monitor lab/diagnostic results  - Monitor all insertion sites, i.e. indwelling lines, tubes, and drains  - Monitor endotracheal if appropriate and nasal secretions for changes in amount and color  - [place] appropriate cooling/warming therapies per order  - Administer medications as ordered  - Instruct and encourage patient and family to use good hand hygiene technique  - Identify and instruct in appropriate isolation precautions for identified infection/condition  Outcome: Progressing  Goal: Absence of fever/infection during neutropenic period  Description: INTERVENTIONS:  - Monitor WBC    Outcome: Progressing     Problem: SAFETY ADULT  Goal: Maintain or return to baseline ADL function  Description: INTERVENTIONS:  -  Assess patient's ability to carry out ADLs; assess patient's baseline for ADL function and identify physical deficits which impact ability to perform ADLs (bathing, care of mouth/teeth, toileting, grooming, dressing, etc.)  - Assess/evaluate cause of self-care deficits   - Assess range of motion  - Assess patient's mobility; develop plan if impaired  - Assess patient's need for assistive devices and provide as appropriate  - Encourage maximum independence but intervene and supervise when necessary  - Involve family in performance of ADLs  - Assess for home care needs following discharge   - Consider OT consult to assist with ADL evaluation and planning for discharge  - Provide patient education as appropriate  Outcome: Progressing  Goal: Maintains/Returns to pre admission functional level  Description: INTERVENTIONS:  - Perform BMAT or MOVE assessment daily.   - Set and communicate daily mobility goal to care team and patient/family/caregiver. - Collaborate with rehabilitation services on mobility goals if consulted  - Perform Range of Motion 3 times a day. - Reposition patient every 3 hours.   - Dangle patient 3 times a day  - Stand patient 3 times a day  - Ambulate patient 3 times a day  - Out of bed to chair 3 times a day   - Out of bed for meals 3 times a day  - Out of bed for toileting  - Record patient progress and toleration of activity level   Outcome: Progressing  Goal: Patient will remain free of falls  Description: INTERVENTIONS:  - Educate patient/family on patient safety including physical limitations  - Instruct patient to call for assistance with activity   - Consult OT/PT to assist with strengthening/mobility   - Keep Call bell within reach  - Keep bed low and locked with side rails adjusted as appropriate  - Keep care items and personal belongings within reach  - Initiate and maintain comfort rounds  - Make Fall Risk Sign visible to staff  - Offer Toileting every 2 Hours, in advance of need  - Initiate/Maintain bed alarm  - Obtain necessary fall risk management equipment: socks  - Apply yellow socks and bracelet for high fall risk patients  - Consider moving patient to room near nurses station  Outcome: Progressing     Problem: DISCHARGE PLANNING  Goal: Discharge to home or other facility with appropriate resources  Description: INTERVENTIONS:  - Identify barriers to discharge w/patient and caregiver  - Arrange for needed discharge resources and transportation as appropriate  - Identify discharge learning needs (meds, wound care, etc.)  - Arrange for interpretive services to assist at discharge as needed  - Refer to Case Management Department for coordinating discharge planning if the patient needs post-hospital services based on physician/advanced practitioner order or complex needs related to functional status, cognitive ability, or social support system  Outcome: Progressing     Problem: Knowledge Deficit  Goal: Patient/family/caregiver demonstrates understanding of disease process, treatment plan, medications, and discharge instructions  Description: Complete learning assessment and assess knowledge base.   Interventions:  - Provide teaching at level of understanding  - Provide teaching via preferred learning methods  Outcome: Progressing

## 2023-11-12 NOTE — PLAN OF CARE
Problem: MOBILITY - ADULT  Goal: Maintain or return to baseline ADL function  Description: INTERVENTIONS:  -  Assess patient's ability to carry out ADLs; assess patient's baseline for ADL function and identify physical deficits which impact ability to perform ADLs (bathing, care of mouth/teeth, toileting, grooming, dressing, etc.)  - Assess/evaluate cause of self-care deficits   - Assess range of motion  - Assess patient's mobility; develop plan if impaired  - Assess patient's need for assistive devices and provide as appropriate  - Encourage maximum independence but intervene and supervise when necessary  - Involve family in performance of ADLs  - Assess for home care needs following discharge   - Consider OT consult to assist with ADL evaluation and planning for discharge  - Provide patient education as appropriate  Outcome: Progressing  Goal: Maintains/Returns to pre admission functional level  Description: INTERVENTIONS:  - Perform BMAT or MOVE assessment daily.   - Set and communicate daily mobility goal to care team and patient/family/caregiver. - Collaborate with rehabilitation services on mobility goals if consulted  - Perform Range of Motion x times a day. - Reposition patient every x hours.   - Dangle patient x times a day  - Stand patient x times a day  - Ambulate patient x times a day  - Out of bed to chair x times a day   - Out of bed for meals x times a day  - Out of bed for toileting  - Record patient progress and toleration of activity level   Outcome: Progressing     Problem: PAIN - ADULT  Goal: Verbalizes/displays adequate comfort level or baseline comfort level  Description: Interventions:  - Encourage patient to monitor pain and request assistance  - Assess pain using appropriate pain scale  - Administer analgesics based on type and severity of pain and evaluate response  - Implement non-pharmacological measures as appropriate and evaluate response  - Consider cultural and social influences on pain and pain management  - Notify physician/advanced practitioner if interventions unsuccessful or patient reports new pain  Outcome: Progressing     Problem: INFECTION - ADULT  Goal: Absence or prevention of progression during hospitalization  Description: INTERVENTIONS:  - Assess and monitor for signs and symptoms of infection  - Monitor lab/diagnostic results  - Monitor all insertion sites, i.e. indwelling lines, tubes, and drains  - Monitor endotracheal if appropriate and nasal secretions for changes in amount and color  - [place] appropriate cooling/warming therapies per order  - Administer medications as ordered  - Instruct and encourage patient and family to use good hand hygiene technique  - Identify and instruct in appropriate isolation precautions for identified infection/condition  Outcome: Progressing  Goal: Absence of fever/infection during neutropenic period  Description: INTERVENTIONS:  - Monitor WBC    Outcome: Progressing     Problem: SAFETY ADULT  Goal: Maintain or return to baseline ADL function  Description: INTERVENTIONS:  -  Assess patient's ability to carry out ADLs; assess patient's baseline for ADL function and identify physical deficits which impact ability to perform ADLs (bathing, care of mouth/teeth, toileting, grooming, dressing, etc.)  - Assess/evaluate cause of self-care deficits   - Assess range of motion  - Assess patient's mobility; develop plan if impaired  - Assess patient's need for assistive devices and provide as appropriate  - Encourage maximum independence but intervene and supervise when necessary  - Involve family in performance of ADLs  - Assess for home care needs following discharge   - Consider OT consult to assist with ADL evaluation and planning for discharge  - Provide patient education as appropriate  Outcome: Progressing  Goal: Maintains/Returns to pre admission functional level  Description: INTERVENTIONS:  - Perform BMAT or MOVE assessment daily.   - Set and communicate daily mobility goal to care team and patient/family/caregiver. - Collaborate with rehabilitation services on mobility goals if consulted  - Perform Range of Motion x times a day. - Reposition patient every x hours.   - Dangle patient x times a day  - Stand patient x times a day  - Ambulate patient x times a day  - Out of bed to chair x times a day   - Out of bed for meals x times a day  - Out of bed for toileting  - Record patient progress and toleration of activity level   Outcome: Progressing  Goal: Patient will remain free of falls  Description: INTERVENTIONS:  - Educate patient/family on patient safety including physical limitations  - Instruct patient to call for assistance with activity   - Consult OT/PT to assist with strengthening/mobility   - Keep Call bell within reach  - Keep bed low and locked with side rails adjusted as appropriate  - Keep care items and personal belongings within reach  - Initiate and maintain comfort rounds  - Make Fall Risk Sign visible to staff  - Offer Toileting every x Hours, in advance of need  - Initiate/Maintain alarm  - Obtain necessary fall risk management equipment:   - Apply yellow socks and bracelet for high fall risk patients  - Consider moving patient to room near nurses station  Outcome: Progressing     Problem: DISCHARGE PLANNING  Goal: Discharge to home or other facility with appropriate resources  Description: INTERVENTIONS:  - Identify barriers to discharge w/patient and caregiver  - Arrange for needed discharge resources and transportation as appropriate  - Identify discharge learning needs (meds, wound care, etc.)  - Arrange for interpretive services to assist at discharge as needed  - Refer to Case Management Department for coordinating discharge planning if the patient needs post-hospital services based on physician/advanced practitioner order or complex needs related to functional status, cognitive ability, or social support system  Outcome: Progressing     Problem: Knowledge Deficit  Goal: Patient/family/caregiver demonstrates understanding of disease process, treatment plan, medications, and discharge instructions  Description: Complete learning assessment and assess knowledge base.   Interventions:  - Provide teaching at level of understanding  - Provide teaching via preferred learning methods  Outcome: Progressing

## 2023-11-12 NOTE — PROGRESS NOTES
[facility]  Progress Note  Name: April Mims  MRN: [id number]  Unit/Bed#: -27 I Date of Admission: 11/10/2023   Date of Service: 11/12/2023 I Hospital Day: 1    Assessment/Plan   * Accidental fall on or from stairs or steps  Assessment & Plan  History of mood disorder and hypertension was holding water going up steps and fell down 13 wooden steps  Patient denies any precipitating factors. Has been falling a lot. Plain films without fracture. At baseline lives with sister. PT/OT to evaluate for discharge needs. CKD (chronic kidney disease) stage 4, GFR 15-29 ml/min ([facility])  Assessment & Plan  Renal function stable at baseline    Results from last 7 days   Lab Units 11/12/23  0347 11/11/23  0444 11/10/23  1014   BUN mg/dL 26* 19 22   CREATININE mg/dL 2.82* 2.64* 2.60*   EGFR ml/min/1.73sq m 17 18 19       Moderate persistent asthma without complication  Assessment & Plan  No exacerbation continue Symbicort    Mood disorder (HCC)  Assessment & Plan  Bipolar depression mood stable  Continue fluvoxamine haloperidol quetiapine venlafaxine clonazepam and lamictal    Primary hypertension  Assessment & Plan  Stable  Continue carvedilol amlodipine and amiloride    Hypercholesteremia  Assessment & Plan  Continue pravastatin    VTE Pharmacologic Prophylaxis: VTE Score: 7 High Risk (Score >/= 5) - Pharmacological DVT Prophylaxis Ordered: heparin. Sequential Compression Devices Ordered. Patient Centered Rounds: I have performed bedside rounds with nursing staff today. Discussions with Specialists or Other Care Team Provider:     Education and Discussions with Family / Patient: Attempted to update  (sister) via phone. Unable to contact. Phone number not in service. Time Spent for Care:    This time was spent on one or more of the following: performing physical exam; counseling and coordination of care; obtaining or reviewing history; documenting in the medical record; reviewing/ordering tests, medications or procedures; communicating with other healthcare professionals and discussing with patient's family/caregivers. Current Length of Stay: 1 day(s)  Current Patient Status: Inpatient   Certification Statement: The patient will continue to require additional inpatient hospital stay due to PT/OT evaluations  Discharge Plan: Anticipate discharge in 24-48 hrs to discharge location to be determined pending rehab evaluations. Code Status: Level 1 - Full Code      Subjective:   Patient seen and examined. Feeling okay. Still having right-sided body pains from fall. Objective:   Vitals: Blood pressure 158/80, pulse 81, temperature 97.5 °F (36.4 °C), temperature source Oral, resp. rate 18, SpO2 93 %, not currently breastfeeding. Intake/Output Summary (Last 24 hours) at 11/12/2023 1335  Last data filed at 11/12/2023 0838  Gross per 24 hour   Intake 270 ml   Output --   Net 270 ml       Physical Exam  Vitals reviewed. Constitutional:       General: She is not in acute distress. Appearance: Normal appearance. HENT:      Head:      Comments: Sutures over right eyebrow  Eyes:      General: No scleral icterus. Cardiovascular:      Rate and Rhythm: Regular rhythm. Pulmonary:      Breath sounds: Decreased breath sounds present. No wheezing. Abdominal:      General: Bowel sounds are normal.      Tenderness: There is no guarding or rebound. Musculoskeletal:         General: No swelling. Skin:     General: Skin is warm. Findings: Bruising present. Neurological:      Mental Status: She is alert.    Psychiatric:         Mood and Affect: Mood normal.       Additional Data:   Labs:  Results from last 7 days   Lab Units 11/12/23 0347 11/11/23 0444 11/10/23  1014   WBC Thousand/uL 5.34 5.72 7.90   HEMOGLOBIN g/dL 10.4* 11.4* 12.0   PLATELETS Thousands/uL 192 183 219   MCV fL 102* 101* 100*     Results from last 7 days   Lab Units 11/12/23 0347 11/11/23 0444 11/10/23  1014   SODIUM mmol/L 138 139 140   POTASSIUM mmol/L 4.7 4.5 4.8   CHLORIDE mmol/L 110* 109* 107   CO2 mmol/L 22 22 25   ANION GAP mmol/L 6 8 8   BUN mg/dL 26* 19 22   CREATININE mg/dL 2.82* 2.64* 2.60*   CALCIUM mg/dL 9.1 9.3 9.9   ALBUMIN g/dL  --   --  4.4   TOTAL BILIRUBIN mg/dL  --   --  0.37   ALK PHOS U/L  --   --  191*   ALT U/L  --   --  21   AST U/L  --   --  24   EGFR ml/min/1.73sq m 17 18 19   GLUCOSE RANDOM mg/dL 92 96 108             * I Have Reviewed All Lab Data Listed Above. Cultures:                   Lines/Drains:  Invasive Devices       Peripheral Intravenous Line  Duration             Peripheral IV 11/10/23 Right Antecubital 2 days              Line  Duration             Hemodialysis AV Fistula 11/18/19 Left  1455 days    Hemodialysis AV Fistula 11/10/23 Left Forearm 1 day                  Telemetry:      Imaging:  Imaging Reports Reviewed Today Include:   XR knee 4+ vw right injury    Result Date: 11/11/2023  Impression: Small joint effusion without evidence of fracture or dislocation. Resident: Len Frankel, the attending radiologist, have reviewed the images and agree with the final report above. Workstation performed: [id number]     XR knee 4+ vw left injury    Result Date: 11/11/2023  Impression: Moderate joint effusion. No acute osseous abnormality. Resident: Len Frankel, the attending radiologist, have reviewed the images and agree with the final report above. Workstation performed: [id number]     TRAUMA - CT chest abdomen pelvis w contrast    Result Date: 11/10/2023  Impression: 1. No evidence of acute posttraumatic injury throughout the chest, abdomen or pelvis. 2. Focal right upper lobe groundglass opacity noted on prior study has resolved during the interim. 3. High density dependent probable tablets within the cecum as also noted on prior examinations.  Workstation performed: [id number]     TRAUMA - CT spine cervical wo contrast    Result Date: 11/10/2023  Impression: No cervical spine fracture or traumatic malalignment. Workstation performed: [id number]     TRAUMA - CT head wo contrast    Result Date: 11/10/2023  Impression: No acute intracranial abnormality. Chronic microangiopathic changes. Stable chronic left basal ganglia lacunar infarction. Workstation performed: [id number]     XR Trauma chest portable    Result Date: 11/10/2023  Impression: No acute cardiopulmonary disease.  Workstation performed: [id number]       Scheduled Meds:  Current Facility-Administered Medications   Medication Dose Route Frequency Provider Last Rate    acetaminophen  650 mg Oral Q6H PRN Sissy Vann MD      albuterol  2 puff Inhalation Q6H PRN Sissy Vann, MD      AMILoride  5 mg Oral BID Sissy Vann, MD      amLODIPine  5 mg Oral Daily Sissy Vann MD      aspirin  81 mg Oral Daily Sissy Vann, MD      budesonide-formoterol  2 puff Inhalation BID Sissy Vann MD      carvedilol  25 mg Oral BID With Meals Sissy Vann MD      cholecalciferol  5,000 Units Oral Daily Sissy Vann MD      clonazePAM  0.5 mg Oral TID Sissy Vann MD      fluvoxaMINE  100 mg Oral QAM Sissy Vann MD      fluvoxaMINE  200 mg Oral HS Sissy Vann, MD      haloperidol  2 mg Oral BID Sissy Vann, MD      heparin (porcine)  5,000 Units Subcutaneous Q12H Allie Rangel MD      lamoTRIgine  200 mg Oral BID Sissy Vann MD      oxyCODONE  5 mg Oral Q8H PRN Da Chaudhary PA-C      pantoprazole  40 mg Oral Early Morning Sissy Vann MD      pravastatin  40 mg Oral Daily With Leslie Garcia MD      QUEtiapine  300 mg Oral QAM Sissy Vann, MD      QUEtiapine  500 mg Oral HS Sissy Vann MD      traMADol  50 mg Oral BID PRN Sissy Vann MD      venlafaxine  150 mg Oral QAM Sissy Vann MD         Today, Patient Was Seen By: Marion Vila, DO    ** Please Note: Dictation voice to text software may have been used in the creation of this document.  **

## 2023-11-12 NOTE — ASSESSMENT & PLAN NOTE
Renal function stable at baseline    Results from last 7 days   Lab Units 11/12/23  0347 11/11/23  0444 11/10/23  1014   BUN mg/dL 26* 19 22   CREATININE mg/dL 2.82* 2.64* 2.60*   EGFR ml/min/1.73sq m 17 18 19

## 2023-11-13 PROBLEM — I95.1 ORTHOSTATIC HYPOTENSION: Status: ACTIVE | Noted: 2023-11-13

## 2023-11-13 PROBLEM — S06.0X0A CONCUSSION WITHOUT LOSS OF CONSCIOUSNESS: Status: ACTIVE | Noted: 2023-07-13

## 2023-11-13 LAB
ANION GAP SERPL CALCULATED.3IONS-SCNC: 6 MMOL/L
BUN SERPL-MCNC: 27 MG/DL (ref 5–25)
CALCIUM SERPL-MCNC: 9.3 MG/DL (ref 8.4–10.2)
CHLORIDE SERPL-SCNC: 111 MMOL/L (ref 96–108)
CO2 SERPL-SCNC: 23 MMOL/L (ref 21–32)
CREAT SERPL-MCNC: 2.76 MG/DL (ref 0.6–1.3)
ERYTHROCYTE [DISTWIDTH] IN BLOOD BY AUTOMATED COUNT: 13.5 % (ref 11.6–15.1)
GFR SERPL CREATININE-BSD FRML MDRD: 17 ML/MIN/1.73SQ M
GLUCOSE SERPL-MCNC: 98 MG/DL (ref 65–140)
HCT VFR BLD AUTO: 34.9 % (ref 34.8–46.1)
HGB BLD-MCNC: 10.6 G/DL (ref 11.5–15.4)
MCH RBC QN AUTO: 31 PG (ref 26.8–34.3)
MCHC RBC AUTO-ENTMCNC: 30.4 G/DL (ref 31.4–37.4)
MCV RBC AUTO: 102 FL (ref 82–98)
PLATELET # BLD AUTO: 193 THOUSANDS/UL (ref 149–390)
PMV BLD AUTO: 10.4 FL (ref 8.9–12.7)
POTASSIUM SERPL-SCNC: 4.5 MMOL/L (ref 3.5–5.3)
RBC # BLD AUTO: 3.42 MILLION/UL (ref 3.81–5.12)
SODIUM SERPL-SCNC: 140 MMOL/L (ref 135–147)
WBC # BLD AUTO: 5.85 THOUSAND/UL (ref 4.31–10.16)

## 2023-11-13 PROCEDURE — 80048 BASIC METABOLIC PNL TOTAL CA: CPT | Performed by: INTERNAL MEDICINE

## 2023-11-13 PROCEDURE — 97116 GAIT TRAINING THERAPY: CPT

## 2023-11-13 PROCEDURE — 97530 THERAPEUTIC ACTIVITIES: CPT

## 2023-11-13 PROCEDURE — 97163 PT EVAL HIGH COMPLEX 45 MIN: CPT

## 2023-11-13 PROCEDURE — 99232 SBSQ HOSP IP/OBS MODERATE 35: CPT | Performed by: INTERNAL MEDICINE

## 2023-11-13 PROCEDURE — 85027 COMPLETE CBC AUTOMATED: CPT | Performed by: INTERNAL MEDICINE

## 2023-11-13 PROCEDURE — 97167 OT EVAL HIGH COMPLEX 60 MIN: CPT

## 2023-11-13 RX ORDER — SODIUM CHLORIDE, SODIUM GLUCONATE, SODIUM ACETATE, POTASSIUM CHLORIDE, MAGNESIUM CHLORIDE, SODIUM PHOSPHATE, DIBASIC, AND POTASSIUM PHOSPHATE .53; .5; .37; .037; .03; .012; .00082 G/100ML; G/100ML; G/100ML; G/100ML; G/100ML; G/100ML; G/100ML
100 INJECTION, SOLUTION INTRAVENOUS CONTINUOUS
Status: DISPENSED | OUTPATIENT
Start: 2023-11-13 | End: 2023-11-14

## 2023-11-13 RX ADMIN — HALOPERIDOL 2 MG: 2 TABLET ORAL at 08:15

## 2023-11-13 RX ADMIN — CLONAZEPAM 0.5 MG: 0.5 TABLET ORAL at 08:15

## 2023-11-13 RX ADMIN — HEPARIN SODIUM 5000 UNITS: 5000 INJECTION INTRAVENOUS; SUBCUTANEOUS at 08:16

## 2023-11-13 RX ADMIN — CARVEDILOL 25 MG: 12.5 TABLET, FILM COATED ORAL at 08:16

## 2023-11-13 RX ADMIN — FLUVOXAMINE MALEATE 200 MG: 100 TABLET ORAL at 20:33

## 2023-11-13 RX ADMIN — Medication 5000 UNITS: at 08:14

## 2023-11-13 RX ADMIN — ACETAMINOPHEN 650 MG: 325 TABLET, FILM COATED ORAL at 20:32

## 2023-11-13 RX ADMIN — HALOPERIDOL 2 MG: 2 TABLET ORAL at 20:29

## 2023-11-13 RX ADMIN — PANTOPRAZOLE SODIUM 40 MG: 40 TABLET, DELAYED RELEASE ORAL at 05:39

## 2023-11-13 RX ADMIN — QUETIAPINE 500 MG: 200 TABLET ORAL at 20:33

## 2023-11-13 RX ADMIN — HEPARIN SODIUM 5000 UNITS: 5000 INJECTION INTRAVENOUS; SUBCUTANEOUS at 20:29

## 2023-11-13 RX ADMIN — CARVEDILOL 25 MG: 12.5 TABLET, FILM COATED ORAL at 17:55

## 2023-11-13 RX ADMIN — AMILORIDE HYDROCLORIDE 5 MG: 5 TABLET ORAL at 20:29

## 2023-11-13 RX ADMIN — SODIUM CHLORIDE, SODIUM GLUCONATE, SODIUM ACETATE, POTASSIUM CHLORIDE, MAGNESIUM CHLORIDE, SODIUM PHOSPHATE, DIBASIC, AND POTASSIUM PHOSPHATE 100 ML/HR: .53; .5; .37; .037; .03; .012; .00082 INJECTION, SOLUTION INTRAVENOUS at 12:23

## 2023-11-13 RX ADMIN — AMILORIDE HYDROCLORIDE 5 MG: 5 TABLET ORAL at 08:16

## 2023-11-13 RX ADMIN — FLUVOXAMINE MALEATE 100 MG: 100 TABLET ORAL at 08:15

## 2023-11-13 RX ADMIN — PRAVASTATIN SODIUM 40 MG: 40 TABLET ORAL at 17:55

## 2023-11-13 RX ADMIN — LAMOTRIGINE 200 MG: 100 TABLET ORAL at 17:56

## 2023-11-13 RX ADMIN — BUDESONIDE AND FORMOTEROL FUMARATE DIHYDRATE 2 PUFF: 160; 4.5 AEROSOL RESPIRATORY (INHALATION) at 08:26

## 2023-11-13 RX ADMIN — AMLODIPINE BESYLATE 5 MG: 5 TABLET ORAL at 08:15

## 2023-11-13 RX ADMIN — BUDESONIDE AND FORMOTEROL FUMARATE DIHYDRATE 2 PUFF: 160; 4.5 AEROSOL RESPIRATORY (INHALATION) at 17:56

## 2023-11-13 RX ADMIN — CLONAZEPAM 0.5 MG: 0.5 TABLET ORAL at 17:55

## 2023-11-13 RX ADMIN — VENLAFAXINE HYDROCHLORIDE 150 MG: 37.5 CAPSULE, EXTENDED RELEASE ORAL at 08:15

## 2023-11-13 RX ADMIN — OXYCODONE HYDROCHLORIDE 5 MG: 5 TABLET ORAL at 08:15

## 2023-11-13 RX ADMIN — CLONAZEPAM 0.5 MG: 0.5 TABLET ORAL at 20:29

## 2023-11-13 RX ADMIN — LAMOTRIGINE 200 MG: 100 TABLET ORAL at 08:14

## 2023-11-13 RX ADMIN — QUETIAPINE FUMARATE 300 MG: 300 TABLET ORAL at 08:15

## 2023-11-13 RX ADMIN — ASPIRIN 81 MG CHEWABLE TABLET 81 MG: 81 TABLET CHEWABLE at 08:15

## 2023-11-13 NOTE — ASSESSMENT & PLAN NOTE
Renal function stable at baseline  Continue to monitor BUNs/creatinine    Results from last 7 days   Lab Units 11/13/23  0330 11/12/23  0347 11/11/23  0444 11/10/23  1014   BUN mg/dL 27* 26* 19 22   CREATININE mg/dL 2.76* 2.82* 2.64* 2.60*   EGFR ml/min/1.73sq m 17 17 18 19

## 2023-11-13 NOTE — PROGRESS NOTES
[facility]  Progress Note  Name: Talya Aldana  MRN: [id number]  Unit/Bed#: -25 I Date of Admission: 11/10/2023   Date of Service: 11/13/2023 I Hospital Day: 2    Assessment/Plan   Orthostatic hypotension  Assessment & Plan  Currently on IVF  Continue with fall/safety precautions  For details see under primary hypertension    Continuous opioid dependence [facility])  Assessment & Plan  Patient on tramadol 50 mg every 12 hours as needed for pain. PDMP reviewed on admit    GERD (gastroesophageal reflux disease)  Assessment & Plan  Continue Protonix    CKD (chronic kidney disease) stage 4, GFR 15-29 ml/min ([facility])  Assessment & Plan  Renal function stable at baseline  Continue to monitor BUNs/creatinine    Results from last 7 days   Lab Units 11/13/23  0330 11/12/23  0347 11/11/23  0444 11/10/23  1014   BUN mg/dL 27* 26* 19 22   CREATININE mg/dL 2.76* 2.82* 2.64* 2.60*   EGFR ml/min/1.73sq m 17 17 18 19       Moderate persistent asthma without complication  Assessment & Plan  No exacerbation   Comfortable on room air   continue Symbicort    Mood disorder (HCC)  Assessment & Plan  Bipolar depression mood stable  Continue fluvoxamine haloperidol quetiapine venlafaxine clonazepam and lamictal    Primary hypertension  Assessment & Plan  Episode of presyncopal attack on 11/13. Readings positive for orthostatic hypotension  Will give IVF for now  Resume carvedilol, amlodipine and amiloride once stable    Hypercholesteremia  Assessment & Plan  Continue pravastatin    * Concussion without loss of consciousness  Assessment & Plan  History of mood disorder and hypertension was holding water going up steps and fell down 13 wooden steps  Patient denies any precipitating factors. Has been falling a lot. Plain films without fracture. At baseline lives with sister.   PT/OT evaluated on 11/13-during evaluation patient had 3 presyncopal attack secondary to orthostasis  Continue with supportive care  Continue with fall/safety precautions               VTE Pharmacologic Prophylaxis: VTE Score: 7 Moderate Risk (Score 3-4) - Pharmacological DVT Prophylaxis Ordered: heparin. Patient Centered Rounds: I performed bedside rounds with nursing staff today. Discussions with Specialists or Other Care Team Jaimieer: yes    Education and Discussions with Family / Patient:  yes. Total Time Spent on Date of Encounter in care of patient: 35 mins. This time was spent on one or more of the following: performing physical exam; counseling and coordination of care; obtaining or reviewing history; documenting in the medical record; reviewing/ordering tests, medications or procedures; communicating with other healthcare professionals and discussing with patient's family/caregivers. Current Length of Stay: 2 day(s)  Current Patient Status: Inpatient   Certification Statement: The patient will continue to require additional inpatient hospital stay due to orthostatic hypotension   Discharge Plan: Anticipate discharge in 24-48 hrs to D    Code Status: Level 1 - Full Code    Subjective:   Seen and examined at bedside. Complaining of generalized weakness. Not completely at baseline. No overnight events reported    Objective:     Vitals:   Temp (24hrs), Av.8 °F (36.6 °C), Min:97.7 °F (36.5 °C), Max:98.2 °F (36.8 °C)    Temp:  [97.7 °F (36.5 °C)-98.2 °F (36.8 °C)] 98.2 °F (36.8 °C)  HR:  [79-85] 85  Resp:  [14-19] 14  BP: ()/(57-82) 83/57  SpO2:  [92 %-94 %] 93 %  There is no height or weight on file to calculate BMI. Input and Output Summary (last 24 hours): Intake/Output Summary (Last 24 hours) at 2023 1342  Last data filed at 2023 0918  Gross per 24 hour   Intake 460 ml   Output --   Net 460 ml       Physical Exam:   Physical Exam  Vitals reviewed. Constitutional:       Appearance: She is obese. HENT:      Head: Atraumatic. Mouth/Throat:      Mouth: Mucous membranes are dry. Eyes:      General: No scleral icterus. Cardiovascular:      Rate and Rhythm: Normal rate and regular rhythm. Heart sounds: Normal heart sounds. Pulmonary:      Breath sounds: Normal breath sounds. Abdominal:      General: Bowel sounds are normal.      Palpations: Abdomen is soft. Musculoskeletal:      Cervical back: Neck supple. Right lower leg: No edema. Left lower leg: No edema. Skin:     General: Skin is warm and dry. Capillary Refill: Capillary refill takes less than 2 seconds. Neurological:      Mental Status: She is alert and oriented to person, place, and time. Motor: Weakness present. Psychiatric:         Mood and Affect: Mood normal.          Additional Data:     Labs:  Results from last 7 days   Lab Units 11/13/23  0330 11/12/23  0347   WBC Thousand/uL 5.85 5.34   HEMOGLOBIN g/dL 10.6* 10.4*   HEMATOCRIT % 34.9 34.2*   PLATELETS Thousands/uL 193 192   NEUTROS PCT %  --  60   LYMPHS PCT %  --  29   MONOS PCT %  --  10   EOS PCT %  --  0     Results from last 7 days   Lab Units 11/13/23  0330 11/11/23  0444 11/10/23  1014   SODIUM mmol/L 140   < > 140   POTASSIUM mmol/L 4.5   < > 4.8   CHLORIDE mmol/L 111*   < > 107   CO2 mmol/L 23   < > 25   BUN mg/dL 27*   < > 22   CREATININE mg/dL 2.76*   < > 2.60*   ANION GAP mmol/L 6   < > 8   CALCIUM mg/dL 9.3   < > 9.9   ALBUMIN g/dL  --   --  4.4   TOTAL BILIRUBIN mg/dL  --   --  0.37   ALK PHOS U/L  --   --  191*   ALT U/L  --   --  21   AST U/L  --   --  24   GLUCOSE RANDOM mg/dL 98   < > 108    < > = values in this interval not displayed. Lines/Drains:  Invasive Devices       Peripheral Intravenous Line  Duration             Peripheral IV 11/13/23 Dorsal (posterior); Right Hand <1 day              Line  Duration             Hemodialysis AV Fistula 11/18/19 Left  1456 days    Hemodialysis AV Fistula 11/10/23 Left Forearm 2 days                          Imaging: No pertinent imaging reviewed.     Recent Cultures (last 7 days):         Last 24 Hours Medication List:   Current Facility-Administered Medications   Medication Dose Route Frequency Provider Last Rate    acetaminophen  650 mg Oral Q6H PRN Vandana Quiroz MD      albuterol  2 puff Inhalation Q6H PRN Vandana Quiroz MD      AMILoride  5 mg Oral BID Vandana Quiroz MD      amLODIPine  5 mg Oral Daily Vandana Quiroz MD      aspirin  81 mg Oral Daily Vandana Quiroz MD      budesonide-formoterol  2 puff Inhalation BID Vandana Quiroz MD      carvedilol  25 mg Oral BID With Meals Vandana Quiroz MD      cholecalciferol  5,000 Units Oral Daily Vandana Quiroz MD      clonazePAM  0.5 mg Oral TID Vandana Quiroz MD      fluvoxaMINE  100 mg Oral QAM Vandana Quiroz MD      fluvoxaMINE  200 mg Oral HS Vandana Quiroz MD      haloperidol  2 mg Oral BID Vandana Quiroz MD      heparin (porcine)  5,000 Units Subcutaneous Q12H [address] Vandana Quiroz MD      lamoTRIgine  200 mg Oral BID Vandana Quiroz MD      multi-electrolyte  100 mL/hr Intravenous Continuous Angélica Peterson  mL/hr (11/13/23 1223)    oxyCODONE  5 mg Oral Q8H PRN Payam Álvarez PA-C      pantoprazole  40 mg Oral Early Morning Vandana Quiroz MD      pravastatin  40 mg Oral Daily With La Blount MD      QUEtiapine  300 mg Oral QAM Vandana Quiroz MD      QUEtiapine  500 mg Oral HS Vandana Quiroz MD      traMADol  50 mg Oral BID PRN Vandana Quiroz MD      venlafaxine  150 mg Oral QAM Vandana Quiroz MD          Today, Patient Was Seen By: Angélica Peterson MD    **Please Note: This note may have been constructed using a voice recognition system. **

## 2023-11-13 NOTE — PLAN OF CARE
Problem: MOBILITY - ADULT  Goal: Maintain or return to baseline ADL function  Description: INTERVENTIONS:  -  Assess patient's ability to carry out ADLs; assess patient's baseline for ADL function and identify physical deficits which impact ability to perform ADLs (bathing, care of mouth/teeth, toileting, grooming, dressing, etc.)  - Assess/evaluate cause of self-care deficits   - Assess range of motion  - Assess patient's mobility; develop plan if impaired  - Assess patient's need for assistive devices and provide as appropriate  - Encourage maximum independence but intervene and supervise when necessary  - Involve family in performance of ADLs  - Assess for home care needs following discharge   - Consider OT consult to assist with ADL evaluation and planning for discharge  - Provide patient education as appropriate  11/13/2023 0934 by Zuly Randle RN  Outcome: Progressing  11/13/2023 0934 by Zuly Randle RN  Outcome: Progressing  Goal: Maintains/Returns to pre admission functional level  Description: INTERVENTIONS:  - Perform BMAT or MOVE assessment daily.   - Set and communicate daily mobility goal to care team and patient/family/caregiver. - Collaborate with rehabilitation services on mobility goals if consulted  - Perform Range of Motion 2 times a day. - Reposition patient every 2 hours.   - Dangle patient 2 times a day  - Stand patient 2 times a day  - Ambulate patient 2 times a day  - Out of bed to chair 2 times a day   - Out of bed for meals 2 times a day  - Out of bed for toileting  - Record patient progress and toleration of activity level   11/13/2023 0934 by Zuly Randle RN  Outcome: Progressing  11/13/2023 0934 by Zuly Randle RN  Outcome: Progressing     Problem: PAIN - ADULT  Goal: Verbalizes/displays adequate comfort level or baseline comfort level  Description: Interventions:  - Encourage patient to monitor pain and request assistance  - Assess pain using appropriate pain scale  - Administer analgesics based on type and severity of pain and evaluate response  - Implement non-pharmacological measures as appropriate and evaluate response  - Consider cultural and social influences on pain and pain management  - Notify physician/advanced practitioner if interventions unsuccessful or patient reports new pain  11/13/2023 0934 by Ceci Plasencia RN  Outcome: Progressing  11/13/2023 0934 by Ceci Plasencia RN  Outcome: Progressing     Problem: INFECTION - ADULT  Goal: Absence or prevention of progression during hospitalization  Description: INTERVENTIONS:  - Assess and monitor for signs and symptoms of infection  - Monitor lab/diagnostic results  - Monitor all insertion sites, i.e. indwelling lines, tubes, and drains  - Monitor endotracheal if appropriate and nasal secretions for changes in amount and color  - [place]once appropriate cooling/warming therapies per order  - Administer medications as ordered  - Instruct and encourage patient and family to use good hand hygiene technique  - Identify and instruct in appropriate isolation precautions for identified infection/condition  11/13/2023 0934 by Ceci Plasencia RN  Outcome: Progressing  11/13/2023 0934 by Ceci Plasencia RN  Outcome: Progressing  Goal: Absence of fever/infection during neutropenic period  Description: INTERVENTIONS:  - Monitor WBC    11/13/2023 0934 by Ceci Plasencia RN  Outcome: Progressing  11/13/2023 0934 by Ceci Plasencia RN  Outcome: Progressing     Problem: SAFETY ADULT  Goal: Maintain or return to baseline ADL function  Description: INTERVENTIONS:  -  Assess patient's ability to carry out ADLs; assess patient's baseline for ADL function and identify physical deficits which impact ability to perform ADLs (bathing, care of mouth/teeth, toileting, grooming, dressing, etc.)  - Assess/evaluate cause of self-care deficits   - Assess range of motion  - Assess patient's mobility; develop plan if impaired  - Assess patient's need for assistive devices and provide as appropriate  - Encourage maximum independence but intervene and supervise when necessary  - Involve family in performance of ADLs  - Assess for home care needs following discharge   - Consider OT consult to assist with ADL evaluation and planning for discharge  - Provide patient education as appropriate  11/13/2023 0934 by Monico Retana RN  Outcome: Progressing  11/13/2023 0934 by Monico Retana RN  Outcome: Progressing  Goal: Maintains/Returns to pre admission functional level  Description: INTERVENTIONS:  - Perform BMAT or MOVE assessment daily.   - Set and communicate daily mobility goal to care team and patient/family/caregiver. - Collaborate with rehabilitation services on mobility goals if consulted  - Perform Range of Motion 2 times a day. - Reposition patient every 2 hours.   - Dangle patient 2 times a day  - Stand patient 2 times a day  - Ambulate patient 2 times a day  - Out of bed to chair 2 times a day   - Out of bed for meals 2 times a day  - Out of bed for toileting  - Record patient progress and toleration of activity level   11/13/2023 0934 by Monico Retana RN  Outcome: Progressing  11/13/2023 0934 by Monico Retana RN  Outcome: Progressing  Goal: Patient will remain free of falls  Description: INTERVENTIONS:  - Educate patient/family on patient safety including physical limitations  - Instruct patient to call for assistance with activity   - Consult OT/PT to assist with strengthening/mobility   - Keep Call bell within reach  - Keep bed low and locked with side rails adjusted as appropriate  - Keep care items and personal belongings within reach  - Initiate and maintain comfort rounds  - Make Fall Risk Sign visible to staff  - Offer Toileting every 2 Hours, in advance of need  - Initiate/Maintain alarm  - Obtain necessary fall risk management equipment: socks  - Apply yellow socks and bracelet for high fall risk patients  - Consider moving patient to room near nurses station  11/13/2023 0934 by Eric Hatch RN  Outcome: Progressing  11/13/2023 0934 by Eric Hatch RN  Outcome: Progressing     Problem: DISCHARGE PLANNING  Goal: Discharge to home or other facility with appropriate resources  Description: INTERVENTIONS:  - Identify barriers to discharge w/patient and caregiver  - Arrange for needed discharge resources and transportation as appropriate  - Identify discharge learning needs (meds, wound care, etc.)  - Arrange for interpretive services to assist at discharge as needed  - Refer to Case Management Department for coordinating discharge planning if the patient needs post-hospital services based on physician/advanced practitioner order or complex needs related to functional status, cognitive ability, or social support system  11/13/2023 0934 by Eric Hatch RN  Outcome: Progressing  11/13/2023 0934 by Eric Hatch RN  Outcome: Progressing     Problem: Knowledge Deficit  Goal: Patient/family/caregiver demonstrates understanding of disease process, treatment plan, medications, and discharge instructions  Description: Complete learning assessment and assess knowledge base.   Interventions:  - Provide teaching at level of understanding  - Provide teaching via preferred learning methods  11/13/2023 0934 by Eric Hatch, RN  Outcome: Progressing  11/13/2023 0934 by Eric Hatch RN  Outcome: Progressing     Problem: Potential for Falls  Goal: Patient will remain free of falls  Description: INTERVENTIONS:  - Educate patient/family on patient safety including physical limitations  - Instruct patient to call for assistance with activity   - Consult OT/PT to assist with strengthening/mobility   - Keep Call bell within reach  - Keep bed low and locked with side rails adjusted as appropriate  - Keep care items and personal belongings within reach  - Initiate and maintain comfort rounds  - Make Fall Risk Sign visible to staff  - Offer Toileting every 2 Hours, in advance of need  - Initiate/Maintain alarm  - Obtain necessary fall risk management equipment: socks  - Apply yellow socks and bracelet for high fall risk patients  - Consider moving patient to room near nurses station  11/13/2023 0934 by Molly Gutierrez, RN  Outcome: Progressing  11/13/2023 0934 by Molly Gutierrez, RN  Outcome: Progressing

## 2023-11-13 NOTE — PHYSICAL THERAPY NOTE
PHYSICAL THERAPY EVALUATION NOTE    Patient Name: Daniela CATALAN Date: 2023    AGE:   66 y.o. Mrn:   [id number]  ADMIT DX:  Injury [T14.90XA]  Hypoxia [R09.02]  Post-traumatic headache [G44.309]  Traumatic ecchymosis of multiple sites [T14. [facility] down stairs, initial encounter [W10.8XXA]  Eyebrow laceration, right, initial encounter [S01.111A]    Past Medical History:   Diagnosis Date    Anxiety     Anxiety disorder     Arthritis     At risk for falls     Bipolar 2 disorder (HCC)     Chronic back pain     Chronic kidney disease     Closed fracture of distal end of right fibula with routine healing 2020    COVID-19     in 2021    CVA (cerebral vascular accident) [facility])     noted on MRI in the past    Depression     GERD (gastroesophageal reflux disease)     Hypercholesteremia     Hypernatremia     Hypertension     Hypokalemia     Idiopathic chronic pancreatitis ([address]) 2018    Intervertebral disc disorder with radiculopathy of lumbosacral region     resolved: 2015    Kidney disease     Kidney disease     Limb alert care status     LUE-fistula    Panic attacks     Pericardial effusion     PONV (postoperative nausea and vomiting)     Psychiatric problem     Radiculitis     resolved: 2015    Secondary renal hyperparathyroidism ([address])     Stroke ([address])     Vitamin D deficiency      Length Of Stay: 2  PHYSICAL THERAPY EVALUATION :   Patient's identity confirmed via 2 patient identifiers (full name and ) at start of session       23 1035   PT Last Visit   PT Visit Date 23   Note Type   Note type Evaluation   Pain Assessment   Pain Assessment Tool 0-10   Pain Score 7   Pain Location/Orientation Orientation: Bilateral;Location: Knee   Pain Onset/Description Onset: Gradual   Restrictions/Precautions   Weight Bearing Precautions Per Order No  (B knee x-rays negative)   Other Precautions Cognitive; Chair Alarm; Bed Alarm; Fall Risk   Home Living   Type of [address] [place] [address]  (S)  Multi-level;Stairs to enter with rails;1/2 bath on main level  (2 MILO, pt resides in the attic after 2 flights of stairs)   Bathroom Shower/Tub Tub/shower unit   Home Equipment Walker  (Pt has been noncompliant w/ using RW, well documented she's supposed to use RW)   Additional Comments Pt reports her sister will be having her stay on the first floor (just 2 MILO) upon DC. Pt reports living in the attic of her sister's home, she states she values her privacy. Pt states she doesn't like to use a RW because she wants to get stronger, and cannot carry up 2 flights of stairs   Prior Function   Level of [place] Independent with ADLs; Independent with functional mobility   Lives With [facility] Help From Family  (Was getting OPPT for B knee pain up until September)   IADLs   (pt reports she drives, but per documentation over the summer w/ PCP, sister does not let her drive. Reports she drove here on Friday and "had a hard time")   Falls in the last 6 months (S)  >10   Vocational On disability   Comments Pt reports she fell this AM putting pants on in standing, FRANCI Blas aware. General   Additional Pertinent History Pt reports she fell this AM putting pants on in standing, FRANCI Blas aware. Family/Caregiver Present No   Cognition   Overall Cognitive Status Impaired  (insight to deficits)   Arousal/Participation Alert   Orientation Level Oriented X4   Memory Decreased recall of precautions;Decreased short term memory   Following Commands Follows multistep commands with increased time or repetition   Comments Pt pleasant and cooperative, but poor insight to deficits.    RLE Assessment   RLE Assessment WFL   Strength RLE   RLE Overall Strength 4-/5   LLE Assessment   LLE Assessment WFL   Strength LLE   LLE Overall Strength 4-/5   Vision-Basic Assessment   Current Vision Wears glasses all the time   Bed Mobility   Supine to Sit 5  Supervision   Additional items Assist x 1   Transfers   Sit to Stand 4  Minimal assistance   Additional items Assist x 1   Stand to Sit 4  Minimal assistance   Additional items Assist x 1   Ambulation/Elevation   Gait pattern Decreased foot clearance; Short stride; Excessively slow   Gait Assistance 4  Minimal assist   Additional items Assist x 1   Assistive Device Other (Comment)  (furniture reaching, HHA)   Distance 15 ft   Ambulation/Elevation Additional Comments No buckling or LOB noted for this ambulation trial. see tx below for ambulation w/ RW   Balance   Static Sitting Fair +   Static Standing Poor +   Ambulatory Poor +   Activity Tolerance   Activity Tolerance Patient limited by fatigue;Patient limited by pain   Medical [facility])   Nurse Made Aware RN Ro[place]   Assessment   Problem List Decreased strength;Decreased endurance; Impaired balance;Decreased mobility; Decreased cognition;Decreased safety awareness; Obesity   Assessment Kelly Soares is a 66 y.o. Female who presents to [address] [place] [address] on 11/10/2023 from home w/ c/o fall down a flight of steps and diagnosis of fall. Imaging negative for fractures. Orders for PT eval and treat received,. Pt presents w/ comorbidities of ambulatory dysfunction, B knee OA, HTN, mood disorder, bipolar I, GERD, OCD, panic disorder w/ agoraphobia, hx of L basal ganglia infarct. At baseline, pt mobilizes independently w/ no AD, and reports >10 falls in the last 6 months. Upon evaluation, pt presents w/ the following deficits: weakness, impaired balance, decreased endurance, and pain limiting functional mobility. Upon eval, pt requires supervision for bed mobility, min A x 1 for transfers, and min A x 1 for gait (no w/ AD, HHA). Based on this PT evaluation today, patient's discharge recommendation is for Level II.  During this admission, pt would benefit from continued skilled inpatient PT in the acute care setting in order to address the abovementioned deficits to maximize function and mobility before DC from acute care. Barriers to Discharge Inaccessible home environment;Decreased caregiver support  (2 MILO, currently requiring A w/ mobility)   Goals   Patient Goals to stop falling   STG Expiration Date 11/23/23   Short Term Goal #1 Patient will: Perform all bed mobility tasks modified independent to improve pt's independence w/ repositioning for decrease risk of skin breakdown, Perform all transfers modified independent consistently from various height surfaces in order to improve I w/ engagement w/ real-world environments/situations, Ambulate at least 100 ft. with roller walker w/ supervision w/o LOB to facilitate return and engagement w/ previous living environment, Navigate 2 steps w/ supervision with unilateral handrail to either improve independence w/ entering home and/or so patient can fully access living areas in home, and Increase all balance 1/2 grade to decrease risk for falls   Plan   Treatment/Interventions Functional transfer training;LE strengthening/ROM; Elevations; Therapeutic exercise; Endurance training;Cognitive reorientation;Patient/family training;Equipment eval/education; Bed mobility;Gait training   PT Frequency 3-5x/wk   Discharge Recommendation   Rehab Resource Intensity Level, PT II (Moderate Resource Intensity)   Equipment Recommended [employer] Package Recommended Wheeled walker   Change/add to 2NDNATURE?  No   Additional Comments (S)  PT SHOULD BE USING RW AT ALL TIMES FOR FUNCTIONAL MOBILITY TO DECREASE HER CHANCE FOR FALLS   AM-PAC Basic Mobility Inpatient   Turning in Flat Bed Without Bedrails 3   Lying on Back to Sitting on Edge of Flat Bed Without Bedrails 3   Moving Bed to Chair 3   Standing Up From Chair Using Arms 3   Walk in Room 3   Climb 3-5 Stairs With Railing 1   Basic Mobility Inpatient Raw Score 16   Basic Mobility Standardized Score 38.32   Highest Level Of Mobility   JH-HLM Goal 5: Stand one or more mins   JH-HLM Achieved 7: Walk 25 feet or more   Additional Treatment Session   Start Time 1050   End Time 1100   Treatment Assessment Pt seated OOB in recliner chair, agreeable to participate in PT intervention for ambulation w/ RW. She requires CGA w/ VC for ambulation. Pt initially CGA to ambulate 30 ft w/ RW. Pt then stops, suddenly BLEs buckle. Pt maintains alertness throughout. Requires max A x 2 from therapists to maintain static standing, RN student nearby able to get chair for patient to sit in. Pt continues to maintain alertness, initially declines dizziness and reports she doesn't know what happens. Recliner chair brought up behind pt, is able to perform STS w/ min A x 2, and then mod A x 2 to sit in recliner chair w/ poor control. Pt then states she felt her ankle roll, this was not observed by this therapist. Pt reports feeling better, decided to check sitting and then standing BP. Pt w/ SIGNIFICANT drop and reports (+) dizziness. Sittin/65 and then standin/57. Pt seated OOB in recliner chair w/ chair alarm on, educated again not to get up without staff. Notified Dr. Chris Ochoa via TT of BPs. Equipment Use RW   Additional Treatment Day 1   End of Consult   Patient Position at End of Consult Bedside chair;Bed/Chair alarm activated; All needs within reach     Vitals:    23 2109 23 0756 23 1051 23 1054   BP: 154/82 150/82 134/65 (S) (!) 83/57   Pulse: 79 82 83 85   Patient Position - Orthostatic VS:   Sitting - Orthostatic VS Standing - Orthostatic VS         Pt would benefit from skilled inpatient PT during this admission in order to facilitate progress towards goals to maximize functional independence    Alise Bianchi, PT, DPT

## 2023-11-13 NOTE — PLAN OF CARE
Problem: OCCUPATIONAL THERAPY ADULT  Goal: Performs self-care activities at highest level of function for planned discharge setting. See evaluation for individualized goals. Description: Treatment Interventions: ADL retraining, Functional transfer training, Compensatory technique education, Activityengagement, Patient/family training, Equipment evaluation/education          See flowsheet documentation for full assessment, interventions and recommendations. Note: Limitation: Decreased ADL status, Decreased self-care trans, Decreased high-level ADLs, Decreased Safe judgement during ADL  Prognosis: Good  Assessment: Pt is a 66 y.o. female seen for OT evaluation at [facility], admitted 11/10/2023 w/ Accidental fall on or from stairs or steps. Comorbidities affecting pt's functional performance at time of assessment include: bipolar disorder, eating disorder, chronic kidney disease stage IV, chronic pain and continuous opioid dependence, GERD. Personal factors affecting pt at time of IE include:steps to enter environment, difficulty performing ADLS, difficulty performing IADLS , limited insight into deficits, and decreased functional mobility . Prior to admission, pt was living with sister in 3 story house with steps to manage. Pt was I w/  ADLS and required some assist IADLS. Pt reports that she was driving at baseline, however per chart, states she does not. Pt was not using at AD at baseline, however has a RW. Upon evaluation: Pt requires sup for bed mobility, min A x1  for functional mobility/transfers, sup for UB ADLs and min-mod A for LB ADLS 2* the following deficits impacting occupational performance: weakness, decreased strength, decreased balance, decreased tolerance, and increased pain. Full objective findings from OT assessment regarding body systems outlined above.  These impairments, as well as risk for falls  limit pt's ability to safely engage in all baseline areas of occupation and mobility. Pt to benefit from continued skilled OT tx while in the hospital to address deficits as defined above and maximize level of functional independence w ADL's and functional mobility. Occupational Performance areas to address include: bathing/shower, toilet hygiene, dressing, and functional mobility. This evaluation required an extensive review of medical and/or therapy records and additional review of physical, cognitive and psychosocial history related to functional performance. Based upon functional performance deficits and assessments, this evaluation has been identified as a high complexity evaluation. The patient's raw score on the AM-PAC Daily Activity inpatient short form is 18, standardized score is 38.66, less than 39.4. Patients at this level are likely to benefit from DC to post-acute rehabilitation services. However please refer to therapist recommendation for discharge planning given other factors that may influence destination. At this time, OT recommendations at time of discharge are level /2/3/4 low/mod/high intensity resources.      Rehab Resource Intensity Level, OT: II (Moderate Resource Intensity)

## 2023-11-13 NOTE — PLAN OF CARE
Problem: MOBILITY - ADULT  Goal: Maintain or return to baseline ADL function  Description: INTERVENTIONS:  -  Assess patient's ability to carry out ADLs; assess patient's baseline for ADL function and identify physical deficits which impact ability to perform ADLs (bathing, care of mouth/teeth, toileting, grooming, dressing, etc.)  - Assess/evaluate cause of self-care deficits   - Assess range of motion  - Assess patient's mobility; develop plan if impaired  - Assess patient's need for assistive devices and provide as appropriate  - Encourage maximum independence but intervene and supervise when necessary  - Involve family in performance of ADLs  - Assess for home care needs following discharge   - Consider OT consult to assist with ADL evaluation and planning for discharge  - Provide patient education as appropriate  Outcome: Progressing  Goal: Maintains/Returns to pre admission functional level  Description: INTERVENTIONS:  - Perform BMAT or MOVE assessment daily.   - Set and communicate daily mobility goal to care team and patient/family/caregiver. - Collaborate with rehabilitation services on mobility goals if consulted  - Perform Range of Motion x times a day. - Reposition patient every x hours.   - Dangle patient x times a day  - Stand patient x times a day  - Ambulate patient x times a day  - Out of bed to chair x times a day   - Out of bed for meals x times a day  - Out of bed for toileting  - Record patient progress and toleration of activity level   Outcome: Progressing     Problem: PAIN - ADULT  Goal: Verbalizes/displays adequate comfort level or baseline comfort level  Description: Interventions:  - Encourage patient to monitor pain and request assistance  - Assess pain using appropriate pain scale  - Administer analgesics based on type and severity of pain and evaluate response  - Implement non-pharmacological measures as appropriate and evaluate response  - Consider cultural and social influences on pain and pain management  - Notify physician/advanced practitioner if interventions unsuccessful or patient reports new pain  Outcome: Progressing     Problem: INFECTION - ADULT  Goal: Absence or prevention of progression during hospitalization  Description: INTERVENTIONS:  - Assess and monitor for signs and symptoms of infection  - Monitor lab/diagnostic results  - Monitor all insertion sites, i.e. indwelling lines, tubes, and drains  - Monitor endotracheal if appropriate and nasal secretions for changes in amount and color  - Hines appropriate cooling/warming therapies per order  - Administer medications as ordered  - Instruct and encourage patient and family to use good hand hygiene technique  - Identify and instruct in appropriate isolation precautions for identified infection/condition  Outcome: Progressing  Goal: Absence of fever/infection during neutropenic period  Description: INTERVENTIONS:  - Monitor WBC    Outcome: Progressing     Problem: SAFETY ADULT  Goal: Maintain or return to baseline ADL function  Description: INTERVENTIONS:  -  Assess patient's ability to carry out ADLs; assess patient's baseline for ADL function and identify physical deficits which impact ability to perform ADLs (bathing, care of mouth/teeth, toileting, grooming, dressing, etc.)  - Assess/evaluate cause of self-care deficits   - Assess range of motion  - Assess patient's mobility; develop plan if impaired  - Assess patient's need for assistive devices and provide as appropriate  - Encourage maximum independence but intervene and supervise when necessary  - Involve family in performance of ADLs  - Assess for home care needs following discharge   - Consider OT consult to assist with ADL evaluation and planning for discharge  - Provide patient education as appropriate  Outcome: Progressing  Goal: Maintains/Returns to pre admission functional level  Description: INTERVENTIONS:  - Perform BMAT or MOVE assessment daily.   - Set and communicate daily mobility goal to care team and patient/family/caregiver. - Collaborate with rehabilitation services on mobility goals if consulted  - Perform Range of Motion x times a day. - Reposition patient every x hours.   - Dangle patient x times a day  - Stand patient x times a day  - Ambulate patient x times a day  - Out of bed to chair x times a day   - Out of bed for meals x times a day  - Out of bed for toileting  - Record patient progress and toleration of activity level   Outcome: Progressing  Goal: Patient will remain free of falls  Description: INTERVENTIONS:  - Educate patient/family on patient safety including physical limitations  - Instruct patient to call for assistance with activity   - Consult OT/PT to assist with strengthening/mobility   - Keep Call bell within reach  - Keep bed low and locked with side rails adjusted as appropriate  - Keep care items and personal belongings within reach  - Initiate and maintain comfort rounds  - Make Fall Risk Sign visible to staff  - Offer Toileting every  Hours, in advance of need  - Initiate/Maintain bedalarm  - Obtain necessary fall risk management equipment:   - Apply yellow socks and bracelet for high fall risk patients  - Consider moving patient to room near nurses station  Outcome: Progressing     Problem: DISCHARGE PLANNING  Goal: Discharge to home or other facility with appropriate resources  Description: INTERVENTIONS:  - Identify barriers to discharge w/patient and caregiver  - Arrange for needed discharge resources and transportation as appropriate  - Identify discharge learning needs (meds, wound care, etc.)  - Arrange for interpretive services to assist at discharge as needed  - Refer to Case Management Department for coordinating discharge planning if the patient needs post-hospital services based on physician/advanced practitioner order or complex needs related to functional status, cognitive ability, or social support system  Outcome: Progressing     Problem: Knowledge Deficit  Goal: Patient/family/caregiver demonstrates understanding of disease process, treatment plan, medications, and discharge instructions  Description: Complete learning assessment and assess knowledge base.   Interventions:  - Provide teaching at level of understanding  - Provide teaching via preferred learning methods  Outcome: Progressing

## 2023-11-13 NOTE — CASE MANAGEMENT
Case Management Progress Note    Patient name Cristiano Moctezuma  Location /-09 MRN [id number]  : 1960 Date 2023       LOS (days): 2  Geometric Mean LOS (GMLOS) (days): 2.30  Days to GMLOS:0.2        OBJECTIVE:        Current admission status: Inpatient  Preferred Pharmacy:   [facility] [address] KARTHIKEYAN HIGGINBOTHAM  [place], [place] - [address] [facility] [address]  Phone: [phone] Fax: [facility], [address] [facility] [id number], [address] [facility], [address] [facility]  [id number], [address] [facility], 700 [facility] [address]  Phone: [phone] Fax: [phone]    [address]  [address] [facility], [address] [facility] [address]  Phone: [phone] Fax: [phone]    [facility] ([address] [facility]  [address] [place] [address] [address]  [address] [place] [address]  Phone: [phone] Fax: [address] [facility], [facility] Jesus Gamez - [facility]. [address] [facility] [address]  Phone: [phone] Fax: [address] [facility], [address] [facility]  [facility]  Phone: [phone] Fax: [phone]    Primary Care Provider: Yee Gong DO    Primary Insurance: MEDICARE  Secondary Insurance:     PROGRESS NOTE:    Spoke with Pt's sister(Radha), Blaise Gallagher informed CM that her updated number is [phone]. CM informed Pt's sister of SNF recommendation. Pt's sister also in agreement for blanket SNF referrals. Referrals sent via [address] CM to follow.

## 2023-11-13 NOTE — PLAN OF CARE
Problem: PHYSICAL THERAPY ADULT  Goal: Performs mobility at highest level of function for planned discharge setting. See evaluation for individualized goals. Description: Treatment/Interventions: Functional transfer training, LE strengthening/ROM, Elevations, Therapeutic exercise, Endurance training, Cognitive reorientation, Patient/family training, Equipment eval/education, Bed mobility, Gait training  Equipment Recommended: Anish Tanner       See flowsheet documentation for full assessment, interventions and recommendations. 11/13/2023 1334 by Emelyn Nuñez, PT  Note:    Problem List: Decreased strength, Decreased endurance, Impaired balance, Decreased mobility, Decreased cognition, Decreased safety awareness, Obesity  Assessment: Jazmin Wells is a 66 y.o. Female who presents to [address] [place] [address] on 11/10/2023 from home w/ c/o fall down a flight of steps and diagnosis of fall. Imaging negative for fractures. Orders for PT eval and treat received,. Pt presents w/ comorbidities of ambulatory dysfunction, B knee OA, HTN, mood disorder, bipolar I, GERD, OCD, panic disorder w/ agoraphobia, hx of L basal ganglia infarct. At baseline, pt mobilizes independently w/ no AD, and reports >10 falls in the last 6 months. Upon evaluation, pt presents w/ the following deficits: weakness, impaired balance, decreased endurance, and pain limiting functional mobility. Upon eval, pt requires supervision for bed mobility, min A x 1 for transfers, and min A x 1 for gait (no w/ AD, HHA). Based on this PT evaluation today, patient's discharge recommendation is for Level II. During this admission, pt would benefit from continued skilled inpatient PT in the acute care setting in order to address the abovementioned deficits to maximize function and mobility before DC from acute care.   Barriers to Discharge: Inaccessible home environment, Decreased caregiver support (2 MILO, currently requiring A w/ mobility)     Rehab Resource Intensity Level, PT: II (Moderate Resource Intensity)    See flowsheet documentation for full assessment.

## 2023-11-13 NOTE — ASSESSMENT & PLAN NOTE
History of mood disorder and hypertension was holding water going up steps and fell down 13 wooden steps  Patient denies any precipitating factors. Has been falling a lot. Plain films without fracture. At baseline lives with sister.   PT/OT evaluated on 11/13-during evaluation patient had 3 presyncopal attack secondary to orthostasis  Continue with supportive care  Continue with fall/safety precautions

## 2023-11-13 NOTE — OCCUPATIONAL THERAPY NOTE
Occupational Therapy Evaluation & Treat     Patient Name: Nishant HOBSON Date: 2023  Problem List  Principal Problem:    Concussion without loss of consciousness  Active Problems:    Hypercholesteremia    Primary hypertension    Mood disorder (HCC)    Moderate persistent asthma without complication    CKD (chronic kidney disease) stage 4, GFR 15-29 ml/min (HCC)    GERD (gastroesophageal reflux disease)    Continuous opioid dependence ([address])    Orthostatic hypotension    Past Medical History  Past Medical History:   Diagnosis Date    Anxiety     Anxiety disorder     Arthritis     At risk for falls     Bipolar 2 disorder ([facility])     Chronic back pain     Chronic kidney disease     Closed fracture of distal end of right fibula with routine healing 2020    COVID-19     in 2021    CVA (cerebral vascular accident) ([address])     noted on MRI in the past    Depression     GERD (gastroesophageal reflux disease)     Hypercholesteremia     Hypernatremia     Hypertension     Hypokalemia     Idiopathic chronic pancreatitis ([address]) 2018    Intervertebral disc disorder with radiculopathy of lumbosacral region     resolved: 2015    Kidney disease     Kidney disease     Limb alert care status     LUE-fistula    Panic attacks     Pericardial effusion     PONV (postoperative nausea and vomiting)     Psychiatric problem     Radiculitis     resolved: 2015    Secondary renal hyperparathyroidism ([address])     Stroke ([address])     Vitamin D deficiency      Past Surgical History  Past Surgical History:   Procedure Laterality Date    BUNIONECTOMY      Left foot     CAST APPLICATION Right 3/94/2389    Procedure: Application short-arm thumb spica splint;  Surgeon: Tyrese Delgado MD;  Location:  MAIN OR;  Service: Orthopedics    COLON SURGERY      COLONOSCOPY  2021    DILATION AND CURETTAGE OF UTERUS      INDUCED       surgically induced    MN ARTERIOVENOUS ANASTOMOSIS OPEN DIRECT Left 11/18/2019    Procedure: CREATION FISTULA ARTERIOVENOUS (AV) left wrists possible left upper;  Surgeon: Ori Cheung MD;  Location: [facility] MAIN OR;  Service: Vascular    AK [facility] W/[facility] W/DIST Lenaa Pare Left 7/1/2019    Procedure: Chris Thomas;  Surgeon: Cyn Yañez DPM;  Location: [facility] MAIN OR;  Service: [address] [place] [address] W/[facility] W/DIST Lenaa Paralee Right 8/3/2020    Procedure: [place];  Surgeon: Cyn Yañez DPM;  Location: [facility] MAIN OR;  Service: Podiatry    AK [facility] W/[facility] Henrene Popper PHLNX OSTEOT Right 9/27/2021    Procedure: Creig Sails, right tameka osteotomy and 2nd claw toe correction;  Surgeon: Marga Oneill MD;  Location: [facility] MAIN OR;  Service: Orthopedics    AK ERCP DX COLLECTION SPECIMEN BRUSHING/WASHING N/A 4/11/2018    Procedure: ENDOSCOPIC RETROGRADE CHOLANGIOPANCREATOGRAPHY (ERCP); Surgeon: Lisa William MD;  Location: [facility] MAIN OR;  Service: Gastroenterology    AK LAPAROSCOPY PROCTOPEXY PROLAPSE N/A 7/13/2018    Procedure: ROBOTIC SIGMOID RESECTION / RECTOPEXY;  Surgeon: Maritza Rodriguez MD;  Location: [facility] MAIN OR;  Service: Colorectal    AK OPEN TREATMENT RADIAL SHAFT FRACTURE Right 10/11/2021    Procedure: OPEN REDUCTION W/ INTERNAL FIXATION (ORIF) RADIUS (WRIST), RIGHT DISTAL;  Surgeon: Vilma Goldberg, MD;  Location: [facility] MAIN OR;  Service: Orthopedics    AK REMOVAL IMPLANT DEEP Right 6/23/2022    Procedure: Removal of hardware volar aspect right distal radius (distal radial plate and screws);   Surgeon: Desire Padilla MD;  Location: [facility] MAIN OR;  Service: Orthopedics    AK SIGMOIDOSCOPY FLX DX W/COLLJ SPEC BR/WA IF PFRMD N/A 7/13/2018    Procedure: Magaly Villasenor;  Surgeon: Maritza Rodriguez MD;  Location: [facility] MAIN OR;  Service: Colorectal    AK TR TDN RESTORE INTRNSC FUNCJ ALL 4 FNGRS Right 6/23/2022    Procedure: Right ring finger flexor digitorum superficialis to flexor pollicis longus tendon transfer; Surgeon: Yunior Garcia MD;  Location:  MAIN OR;  Service: Orthopedics    TUBAL LIGATION Bilateral 1997    US GUIDED THYROID BIOPSY  7/30/2019 11/13/23 1054   OT Last Visit   OT Visit Date 11/13/23   Note Type   Note type Evaluation   Pain Assessment   Pain Assessment Tool 0-10   Pain Score 7   Pain Location/Orientation Orientation: Bilateral;Location: Knee   Restrictions/Precautions   Weight Bearing Precautions Per Order No (x-rays - for acute fx)   Other Precautions Cognitive; Chair Alarm; Bed Alarm; Fall Risk;Pain   Home Living   Type of [facility]  Multi-level;Stairs to enter with rails   Bathroom Shower/Tub Tub/shower unit  (Pt reports "very high tub")   [address]  (Reports that she is supposed to use RW, however not using it.)   Prior Function   Level of [place] Independent with ADLs; Independent with functional mobility   Lives With [facility] Help From   (Sister)   IADLs Independent with driving; Independent with medication management  (Pt questionable historian. Per chart review, sister does not permit pt to drive.)   Falls in the last 6 months (S)  >10     Vocational On disability   ADL   Eating Assistance 7  Independent   Grooming Assistance 7  Independent   UB Bathing Assistance 5  Supervision/Setup   LB Bathing Assistance 4  Minimal Assistance   UB Dressing Assistance 5  Supervision/Setup   LB Dressing Assistance 4  [address]  4  Minimal Assistance   Bed Mobility   Supine to Sit 5  Supervision   Additional items Verbal cues   Additional Comments Pt remained seated in recliner by end of session. Transfers   Sit to Stand 4  Minimal assistance   Additional items Assist x 1;Verbal cues   Stand to Sit 4  Minimal assistance   Additional items Assist x 1;Verbal cues   Functional Mobility   Functional Mobility 4  Minimal assistance   Additional Comments x 1, + hand held assistance.  See treatment note for additional mobility. Balance   Static Sitting Good   Dynamic Sitting Fair +   Static Standing Fair -   Dynamic Standing Poor +   Activity Tolerance   Activity Tolerance Patient limited by fatigue   Medical Staff Made Aware PT Mandy   Nurse Made Aware FRANCI NUNEZ Assessment   RUE Assessment WFL   LUE Assessment   LUE Assessment WFL   Cognition   Overall Cognitive Status Impaired   Arousal/Participation Alert   Attention Attends with cues to redirect   Orientation Level Oriented X4   Memory Decreased recall of precautions;Decreased recall of recent events  (Decreased insight)   Following Commands Follows multistep commands with increased time or repetition   Assessment   Limitation Decreased ADL status; Decreased self-care trans;Decreased high-level ADLs; Decreased Safe judgement during ADL   Prognosis Good   Assessment Pt is a 66 y.o. female seen for OT evaluation at [address] [place] [address], admitted 11/10/2023 w/ Accidental fall on or from stairs or steps. Comorbidities affecting pt's functional performance at time of assessment include: bipolar disorder, eating disorder, chronic kidney disease stage IV, chronic pain and continuous opioid dependence, GERD. Personal factors affecting pt at time of IE include:steps to enter environment, difficulty performing ADLS, difficulty performing IADLS , limited insight into deficits, and decreased functional mobility . Prior to admission, pt was living with sister in 3 story house with steps to manage. Pt was I w/  ADLS and required some assist IADLS. Pt reports that she was driving at baseline, however per chart, states she does not. Pt was not using at AD at baseline, however has a RW. Upon evaluation: Pt requires sup for bed mobility, min A x1  for functional mobility/transfers, sup for UB ADLs and min-mod A for LB ADLS 2* the following deficits impacting occupational performance: weakness, decreased strength, decreased balance, decreased tolerance, and increased pain. Full objective findings from OT assessment regarding body systems outlined above. These impairments, as well as risk for falls  limit pt's ability to safely engage in all baseline areas of occupation and mobility. Pt to benefit from continued skilled OT tx while in the hospital to address deficits as defined above and maximize level of functional independence w ADL's and functional mobility. Occupational Performance areas to address include: bathing/shower, toilet hygiene, dressing, and functional mobility. This evaluation required an extensive review of medical and/or therapy records and additional review of physical, cognitive and psychosocial history related to functional performance. Based upon functional performance deficits and assessments, this evaluation has been identified as a high complexity evaluation. The patient's raw score on the AM-PAC Daily Activity inpatient short form is 18, standardized score is 38.66, less than 39.4. Patients at this level are likely to benefit from DC to post-acute rehabilitation services. However please refer to therapist recommendation for discharge planning given other factors that may influence destination. At this time, OT recommendations at time of discharge are level /2/3/4 low/mod/high intensity resources. Goals   Patient Goals Pt wishes to be able to get to her bedroom in the attic   Plan   Treatment Interventions ADL retraining;Functional transfer training; Compensatory technique education; Activityengagement;Patient/family training;Equipment evaluation/education   Goal Expiration Date 11/23/23   OT Treatment Day 0   OT Frequency 3-5x/wk   Discharge Recommendation   Rehab Resource Intensity Level, OT II (Moderate Resource Intensity)   AM-PAC Daily Activity Inpatient   Lower Body Dressing 2   Bathing 2   Toileting 2   Upper Body Dressing 4   Grooming 4   Eating 4   Daily Activity Raw Score 18   Daily Activity Standardized Score (Calc for Raw Score >=11) 38.66   AM-PAC Applied Cognition Inpatient   Following a Speech/Presentation 3   Understanding Ordinary Conversation 4   Taking Medications 2   Remembering Where Things Are Placed or Put Away 4   Remembering List of 4-5 Errands 3   Taking Care of Complicated Tasks 3   Applied Cognition Raw Score 19   Applied Cognition Standardized Score 39.77   Additional Treatment Session   Start Time 1044   End Time 1054   Treatment Assessment Pt performed functional mobility in hallway with close supervision, however at ~30 feet, pt suddenly buckled requiring mod Ax 2 to maintain upright position. Pt immediately assisted to seated position. Orthostatic BP assessed at this time. Sitting /65. Standing BP 83/57. Pt endorsing mild dizziness. End of Consult   Education Provided Yes   Patient Position at End of Consult Bedside chair;Bed/Chair alarm activated; All needs within reach   Nurse Communication Nurse aware of consult     Pt will achieve the following goals within 10 days. *Pt will complete UB bathing and dressing with mod I.    *Pt will complete LB bathing and dressing with mod I .    * Pt will complete toileting w/ mod I w/ G hygiene/thoroughness using DME PRN    *Pt will complete bed mobility with mod I, with bed flat and no side rail to prep for purposeful tasks    *Pt will perform functional transfers with on/off all surfaces with mod I using DME as needed w/ G balance/safety. *Patient will verbalize 3 safety awareness/ principles to prevent falls in the home setting. *Pt will improve functional mobility during ADL/IADL/leisure tasks to mod I using DME as needed w/ G balance/safety.      Sandor Mckeon, OTR/L

## 2023-11-13 NOTE — ASSESSMENT & PLAN NOTE
Episode of presyncopal attack on 11/13.   Readings positive for orthostatic hypotension  Will give IVF for now  Resume carvedilol, amlodipine and amiloride once stable

## 2023-11-14 VITALS
RESPIRATION RATE: 14 BRPM | HEART RATE: 84 BPM | OXYGEN SATURATION: 93 % | DIASTOLIC BLOOD PRESSURE: 73 MMHG | TEMPERATURE: 97.7 F | SYSTOLIC BLOOD PRESSURE: 136 MMHG

## 2023-11-14 PROBLEM — I95.1 ORTHOSTATIC HYPOTENSION: Status: RESOLVED | Noted: 2023-11-13 | Resolved: 2023-11-14

## 2023-11-14 LAB
ANION GAP SERPL CALCULATED.3IONS-SCNC: 6 MMOL/L
BASOPHILS # BLD AUTO: 0.02 THOUSANDS/ÂΜL (ref 0–0.1)
BASOPHILS NFR BLD AUTO: 1 % (ref 0–1)
BUN SERPL-MCNC: 27 MG/DL (ref 5–25)
CALCIUM SERPL-MCNC: 9.4 MG/DL (ref 8.4–10.2)
CHLORIDE SERPL-SCNC: 110 MMOL/L (ref 96–108)
CO2 SERPL-SCNC: 25 MMOL/L (ref 21–32)
CREAT SERPL-MCNC: 2.53 MG/DL (ref 0.6–1.3)
EOSINOPHIL # BLD AUTO: 0 THOUSAND/ÂΜL (ref 0–0.61)
EOSINOPHIL NFR BLD AUTO: 0 % (ref 0–6)
ERYTHROCYTE [DISTWIDTH] IN BLOOD BY AUTOMATED COUNT: 13.4 % (ref 11.6–15.1)
GFR SERPL CREATININE-BSD FRML MDRD: 19 ML/MIN/1.73SQ M
GLUCOSE SERPL-MCNC: 95 MG/DL (ref 65–140)
HCT VFR BLD AUTO: 33.5 % (ref 34.8–46.1)
HGB BLD-MCNC: 10.2 G/DL (ref 11.5–15.4)
IMM GRANULOCYTES # BLD AUTO: 0.01 THOUSAND/UL (ref 0–0.2)
IMM GRANULOCYTES NFR BLD AUTO: 0 % (ref 0–2)
LYMPHOCYTES # BLD AUTO: 1.37 THOUSANDS/ÂΜL (ref 0.6–4.47)
LYMPHOCYTES NFR BLD AUTO: 34 % (ref 14–44)
MCH RBC QN AUTO: 30.7 PG (ref 26.8–34.3)
MCHC RBC AUTO-ENTMCNC: 30.4 G/DL (ref 31.4–37.4)
MCV RBC AUTO: 101 FL (ref 82–98)
MONOCYTES # BLD AUTO: 0.49 THOUSAND/ÂΜL (ref 0.17–1.22)
MONOCYTES NFR BLD AUTO: 12 % (ref 4–12)
NEUTROPHILS # BLD AUTO: 2.15 THOUSANDS/ÂΜL (ref 1.85–7.62)
NEUTS SEG NFR BLD AUTO: 53 % (ref 43–75)
NRBC BLD AUTO-RTO: 0 /100 WBCS
PLATELET # BLD AUTO: 183 THOUSANDS/UL (ref 149–390)
PMV BLD AUTO: 10.2 FL (ref 8.9–12.7)
POTASSIUM SERPL-SCNC: 4.6 MMOL/L (ref 3.5–5.3)
RBC # BLD AUTO: 3.32 MILLION/UL (ref 3.81–5.12)
SODIUM SERPL-SCNC: 141 MMOL/L (ref 135–147)
WBC # BLD AUTO: 4.04 THOUSAND/UL (ref 4.31–10.16)

## 2023-11-14 PROCEDURE — 80048 BASIC METABOLIC PNL TOTAL CA: CPT | Performed by: INTERNAL MEDICINE

## 2023-11-14 PROCEDURE — 85025 COMPLETE CBC W/AUTO DIFF WBC: CPT | Performed by: INTERNAL MEDICINE

## 2023-11-14 PROCEDURE — 99239 HOSP IP/OBS DSCHRG MGMT >30: CPT | Performed by: INTERNAL MEDICINE

## 2023-11-14 RX ADMIN — OXYCODONE HYDROCHLORIDE 5 MG: 5 TABLET ORAL at 11:48

## 2023-11-14 RX ADMIN — AMILORIDE HYDROCLORIDE 5 MG: 5 TABLET ORAL at 08:07

## 2023-11-14 RX ADMIN — PANTOPRAZOLE SODIUM 40 MG: 40 TABLET, DELAYED RELEASE ORAL at 05:46

## 2023-11-14 RX ADMIN — CARVEDILOL 25 MG: 12.5 TABLET, FILM COATED ORAL at 08:08

## 2023-11-14 RX ADMIN — CLONAZEPAM 0.5 MG: 0.5 TABLET ORAL at 08:08

## 2023-11-14 RX ADMIN — HALOPERIDOL 2 MG: 2 TABLET ORAL at 08:08

## 2023-11-14 RX ADMIN — VENLAFAXINE HYDROCHLORIDE 150 MG: 37.5 CAPSULE, EXTENDED RELEASE ORAL at 08:24

## 2023-11-14 RX ADMIN — ASPIRIN 81 MG CHEWABLE TABLET 81 MG: 81 TABLET CHEWABLE at 08:08

## 2023-11-14 RX ADMIN — HEPARIN SODIUM 5000 UNITS: 5000 INJECTION INTRAVENOUS; SUBCUTANEOUS at 08:08

## 2023-11-14 RX ADMIN — AMLODIPINE BESYLATE 5 MG: 5 TABLET ORAL at 08:07

## 2023-11-14 RX ADMIN — FLUVOXAMINE MALEATE 100 MG: 100 TABLET ORAL at 08:41

## 2023-11-14 RX ADMIN — BUDESONIDE AND FORMOTEROL FUMARATE DIHYDRATE 2 PUFF: 160; 4.5 AEROSOL RESPIRATORY (INHALATION) at 08:41

## 2023-11-14 RX ADMIN — LAMOTRIGINE 200 MG: 100 TABLET ORAL at 08:08

## 2023-11-14 RX ADMIN — QUETIAPINE FUMARATE 300 MG: 300 TABLET ORAL at 08:42

## 2023-11-14 RX ADMIN — Medication 5000 UNITS: at 08:08

## 2023-11-14 NOTE — PROGRESS NOTES
Met with pt "Tangela Castro" for intended introductory visit. Tangela Castro identifies as "growing up [employer] but currently does not endorse a severiano system. She names her sisters as her primary support although she states that she is "nervous" that the sister she lives with is going to "put her in a home." Provided space to verbally process emotions, supportive conversation and identification of strengths which was met with gratitude. Available to follow upon request.     Thank you!         11/14/23 0900   Clinical Encounter Type   Visited With Patient   Routine Visit Introduction

## 2023-11-14 NOTE — CASE MANAGEMENT
Case Management Discharge Planning Note    Patient name Jorge L Methodist  Location /-60 MRN [id number]  : 1960 Date 2023       Current Admission Date: 11/10/2023  Current Admission Diagnosis:Concussion without loss of consciousness   Patient Active Problem List    Diagnosis Date Noted    Moderate aortic stenosis 2023    Pulmonary embolism with infarction ([address]) 2023    ILD (interstitial lung disease) ([address]) 2023    Statin intolerance 2023    Concussion without loss of consciousness 2023    Shortness of breath 2023    Leg swelling 2023    Obesity (BMI 30-39.9)     Umbilical hernia     Pes anserinus bursitis of both knees 2023    Obesity, morbid ([address]) 10/10/2022    Chronic back pain     Severe depressed bipolar I disorder without psychotic features ([address]) 2022    Mixed obsessional thoughts and acts 2022    Eating disorder, unspecified 2022    Pain of right upper extremity 2022    Continuous opioid dependence ([address]) 2022    Injury of right thumb 2022    End stage renal disease ([address]) 2022    GERD (gastroesophageal reflux disease)     Acute shoulder pain 2021    Essential hypertension 2021    Nausea 2021    Aftercare following surgery of the musculoskeletal system 10/27/2021    Closed Colles' fracture of right radius 10/05/2021    Claw toe, acquired, right 2021    Injury of plantar plate, right, initial encounter 2021    Acquired hallux interphalangeus, right 2021    Anxiety     Family history of cardiac disorder in father 2021    Left knee tendonitis 2021    Effusion of right knee 2021    Hallux valgus, right 2020    Acquired hallux interphalangeus of right foot 2020    Bilateral sacroiliitis ([address]) 2020    CKD (chronic kidney disease) stage 4, GFR 15-29 ml/min ([facility]) 2020    Right patellofemoral syndrome 01/30/2020    AVF (arteriovenous fistula) ([address]) 01/08/2020    Steal syndrome dialysis vascular access ([address]) 12/02/2019    Primary osteoarthritis of left knee 10/15/2019    Moderate persistent asthma without complication 98/82/1954    Thyroid nodule 08/14/2019    Abnormal thyroid exam 06/17/2019    Hyperphosphatemia 06/13/2019    Abnormal chest CT 06/05/2019    Infarction of left basal ganglia ([address]) 05/01/2019    Benign neoplasm of colon 04/02/2019    Drug-induced constipation 04/02/2019    Hyperparathyroidism ([address]) 03/19/2019    Mood disorder ([address]) 01/23/2019    Obsessive compulsive disorder 01/23/2019    Panic disorder with agoraphobia 01/23/2019    Eating disorder 01/23/2019    Primary hypertension 01/02/2019    Hyperprolactinemia ([address]) 01/02/2019    Elevated LFTs 11/13/2018    Rectal prolapse 07/13/2018    Idiopathic chronic pancreatitis ([address]) 03/20/2018    Primary osteoarthritis of right knee 03/20/2018    Age-related osteoporosis without current pathological fracture 03/20/2018    Cardiac murmur 03/20/2018    Renal cyst 02/20/2018    Vitamin D deficiency 12/20/2017    Secondary renal hyperparathyroidism ([address]) 12/20/2017    Spondylolisthesis at L5-S1 level 01/20/2017    Hypercholesteremia 01/17/2017    Herniated nucleus pulposus, L5-S1 11/23/2015      LOS (days): 3  Geometric Mean LOS (GMLOS) (days): 2.50  Days to GMLOS:-0.5     OBJECTIVE:  Risk of Unplanned Readmission Score: 34.33         Current admission status: Inpatient   Preferred Pharmacy:   [facility] DR KARTHIKEYAN HIGGINBOTHAM [address] [place], [place] - [address]   [facility] [place] [address]  Phone: [phone] Fax: [address]-[id number], [address], 2325 [facility]-944136, [address], [address] [place] [address]  Phone: [phone] Fax: [phone]    [address] [place] [address] [address]  [address] [facility], [address] [facility] [address]  Phone: [phone] Fax: [phone]    [facility] ([address] [facility]  [address] 114 [facility] [address]  Phone: [phone] Fax: [address], [address] [place] [address] [place] [address]   [address] [place] [address]  Phone: [phone] Fax: 2460 [facility], [address]  Phone: [phone] Fax: [phone]    Primary Care Provider: Micah Leyva DO    Primary Insurance: MEDICARE  Secondary Insurance:     DISCHARGE DETAILS:  IMM Given (Date):: 11/14/23  IMM Given to[de-identified] Patient

## 2023-11-14 NOTE — DISCHARGE SUMMARY
[facility]  Discharge- [facility] 1960, 66 y.o. female MRN: [id number]  Unit/Bed#: -01 Encounter: [id number]  Primary Care Provider: Yee Gong DO   Date and time admitted to hospital: 11/10/2023 10:01 AM    Continuous opioid dependence [facility])  Assessment & Plan  Patient on tramadol 50 mg every 12 hours as needed for pain. PDMP reviewed on admit    GERD (gastroesophageal reflux disease)  Assessment & Plan  Continue Protonix    CKD (chronic kidney disease) stage 4, GFR 15-29 ml/min ([facility])  Assessment & Plan  Renal function stable at baseline  Continue to monitor BUNs/creatinine    Results from last 7 days   Lab Units 11/14/23  0456 11/13/23  0330 11/12/23  0347 11/11/23  0444 11/10/23  1014   BUN mg/dL 27* 27* 26* 19 22   CREATININE mg/dL 2.53* 2.76* 2.82* 2.64* 2.60*   EGFR ml/min/1.73sq m 19 17 17 18 19       Moderate persistent asthma without complication  Assessment & Plan  No exacerbation   Comfortable on room air   continue Symbicort    Mood disorder (HCC)  Assessment & Plan  Bipolar depression mood stable  Continue fluvoxamine haloperidol quetiapine venlafaxine clonazepam and lamictal    Primary hypertension  Assessment & Plan  Episode of presyncopal attack on 11/13. Readings positive for orthostatic hypotension, status post IVF   Resume carvedilol, amlodipine and amiloride once stable    Hypercholesteremia  Assessment & Plan  Continue pravastatin    * Concussion without loss of consciousness  Assessment & Plan  History of mood disorder and hypertension was holding water going up steps and fell down 13 wooden steps  Patient denies any precipitating factors. Has been falling a lot. Plain films without fracture. At baseline lives with sister.   PT/OT evaluated on 11/13-recommended Rehab   Continue with supportive care  Continue with fall/safety precautions    Orthostatic hypotension-resolved as of 11/14/2023  Assessment & Plan  Currently on IVF  Continue with fall/safety precautions  For details see under primary hypertension              Medical Problems       Resolved Problems  Date Reviewed: 11/14/2023            Resolved    Orthostatic hypotension 11/14/2023     Resolved by  Anamika Shannon MD          Admission Date:   Admission Orders (From admission, onward)       Ordered        11/11/23 1434  Inpatient Admission  Once            11/10/23 1237  Place in Observation  Once                            Admitting Diagnosis: Injury [T14.90XA]  Hypoxia [R09.02]  Post-traumatic headache [G44.309]  Traumatic ecchymosis of multiple sites [T14. 8XXA]  Eyebrow laceration, right, initial encounter [S01.111A]    HPI: as per, Razia Riddle MD , 11/10 Aparna Ko is a 66 y.o. female with a PMH of bipolar disorder, eating disorder, chronic kidney disease stage IV, chronic pain and continuous opioid dependence, GERD, who presents after she fell. She was walking on the stairs with 2 cups of water in her hands felt unsteady and fell over 13 stairs. Patient denied any symptoms prior to fall. Lives at home with family, she lives in the attic. Said she feels safe at home. Patient was further admitted as observation    Procedures Performed:   Orders Placed This Encounter   Procedures    Laceration repair       Summary of Hospital Course:     Patient presented with mechanical fall. Trauma work-up in ED was nonrevealing for malalignment or any fracture. She was given supportive care. She was evaluated by PT/OT, who recommended short-term rehab. Discharge plan was discussed with patient and sister, who were agreeable. During PT eval, patient had a presyncopal episode due to orthostatic hypotension. She improved with intravenous fluids. She is encouraged to increase p.o. intake/hydration.     Significant Findings, Care, Treatment and Services Provided: see above     Complications: none    Condition at Discharge: fair         Discharge instructions/Information to patient and family:   See after visit summary for information provided to patient and family. Provisions for Follow-Up Care:  See after visit summary for information related to follow-up care and any pertinent home health orders. PCP: Tamiko Giles DO    Disposition: Skilled nursing facility at [facility]    Planned Readmission: No    Discharge Statement   I spent 35 minutes discharging the patient. This time was spent on the day of discharge. I had direct contact with the patient on the day of discharge. Additional documentation is required if more than 30 minutes were spent on discharge. Discharge Medications:  See after visit summary for reconciled discharge medications provided to patient and family.

## 2023-11-14 NOTE — ASSESSMENT & PLAN NOTE
History of mood disorder and hypertension was holding water going up steps and fell down 13 wooden steps  Patient denies any precipitating factors. Has been falling a lot. Plain films without fracture. At baseline lives with sister.   PT/OT evaluated on 11/13-recommended Rehab   Continue with supportive care  Continue with fall/safety precautions

## 2023-11-14 NOTE — CASE MANAGEMENT
Case Management Discharge Planning Note    Patient name Nishant Geiger  Location /-29 MRN [id number]  : 1960 Date 2023       Current Admission Date: 11/10/2023  Current Admission Diagnosis:Concussion without loss of consciousness   Patient Active Problem List    Diagnosis Date Noted    Moderate aortic stenosis 2023    Pulmonary embolism with infarction ([address]) 2023    ILD (interstitial lung disease) ([address]) 2023    Statin intolerance 2023    Concussion without loss of consciousness 2023    Shortness of breath 2023    Leg swelling 2023    Obesity (BMI 30-39.9)     Umbilical hernia     Pes anserinus bursitis of both knees 2023    Obesity, morbid ([address]) 10/10/2022    Chronic back pain     Severe depressed bipolar I disorder without psychotic features ([address]) 2022    Mixed obsessional thoughts and acts 2022    Eating disorder, unspecified 2022    Pain of right upper extremity 2022    Continuous opioid dependence ([address]) 2022    Injury of right thumb 2022    End stage renal disease ([address]) 2022    GERD (gastroesophageal reflux disease)     Acute shoulder pain 2021    Essential hypertension 2021    Nausea 2021    Aftercare following surgery of the musculoskeletal system 10/27/2021    Closed Colles' fracture of right radius 10/05/2021    Claw toe, acquired, right 2021    Injury of plantar plate, right, initial encounter 2021    Acquired hallux interphalangeus, right 2021    Anxiety     Family history of cardiac disorder in father 2021    Left knee tendonitis 2021    Effusion of right knee 2021    Hallux valgus, right 2020    Acquired hallux interphalangeus of right foot 2020    Bilateral sacroiliitis ([address]) 2020    CKD (chronic kidney disease) stage 4, GFR 15-29 ml/min ([facility]) 2020    Right patellofemoral syndrome 01/30/2020    AVF (arteriovenous fistula) ([address]) 01/08/2020    Steal syndrome dialysis vascular access ([address]) 12/02/2019    Primary osteoarthritis of left knee 10/15/2019    Moderate persistent asthma without complication 61/55/7642    Thyroid nodule 08/14/2019    Abnormal thyroid exam 06/17/2019    Hyperphosphatemia 06/13/2019    Abnormal chest CT 06/05/2019    Infarction of left basal ganglia ([address]) 05/01/2019    Benign neoplasm of colon 04/02/2019    Drug-induced constipation 04/02/2019    Hyperparathyroidism ([address]) 03/19/2019    Mood disorder ([address]) 01/23/2019    Obsessive compulsive disorder 01/23/2019    Panic disorder with agoraphobia 01/23/2019    Eating disorder 01/23/2019    Primary hypertension 01/02/2019    Hyperprolactinemia ([address]) 01/02/2019    Elevated LFTs 11/13/2018    Rectal prolapse 07/13/2018    Idiopathic chronic pancreatitis ([address]) 03/20/2018    Primary osteoarthritis of right knee 03/20/2018    Age-related osteoporosis without current pathological fracture 03/20/2018    Cardiac murmur 03/20/2018    Renal cyst 02/20/2018    Vitamin D deficiency 12/20/2017    Secondary renal hyperparathyroidism ([address]) 12/20/2017    Spondylolisthesis at L5-S1 level 01/20/2017    Hypercholesteremia 01/17/2017    Herniated nucleus pulposus, L5-S1 11/23/2015      LOS (days): 3  Geometric Mean LOS (GMLOS) (days): 2.50  Days to GMLOS:-0.6     OBJECTIVE:  Risk of Unplanned Readmission Score: 34.33         Current admission status: Inpatient   Preferred Pharmacy:   [address] [place] [address] - [address]   [address] [facility] [address]  Phone: [phone] Fax: [facility], [address], [address], 2325 [facility]  T-[id number], [address], [address] [place] [address]  Phone: [phone] Fax: [phone]    23[address] [facility] 71 [facility] [address]  [address], [address] [address]  Phone: [phone] Fax: [phone]    [facility] (1105 [facility]  1282 [facility] 1600 [facility] 100  931 [facility] [address]  Phone: [phone] Fax: [address], [address] [address] [place] [address] [place] [address]  Phone: [phone] Fax: 9145 [facility], [address] [place] [address] [facility]  Phone: [phone] Fax: [phone]    Primary Care Provider: David Aviles DO    Primary Insurance: MEDICARE  Secondary Insurance:     DISCHARGE DETAILS:    Additional Comments: Pt choosing [address] [facility] [place] can accept Pt. Pt agreeable for wheelchair [employer] transport and wheelchair [employer]. Pt reports she will inform her sister that she will be going to [address] this evening. Wheelchair van request sent to [facility] [place] wheelchair van to transport Pt at 4:15pm. Pt, Pt's nurse(Sparkle) made aware. Pt informed CM that her sister was just at the hospital and dropped off clothes for her, Pt informed CM that she let her sister know of transport time.

## 2023-11-14 NOTE — ASSESSMENT & PLAN NOTE
Renal function stable at baseline  Continue to monitor BUNs/creatinine    Results from last 7 days   Lab Units 11/14/23  0456 11/13/23  0330 11/12/23  0347 11/11/23  0444 11/10/23  1014   BUN mg/dL 27* 27* 26* 19 22   CREATININE mg/dL 2.53* 2.76* 2.82* 2.64* 2.60*   EGFR ml/min/1.73sq m 19 17 17 18 19

## 2023-11-14 NOTE — PLAN OF CARE
Problem: MOBILITY - ADULT  Goal: Maintain or return to baseline ADL function  Description: INTERVENTIONS:  -  Assess patient's ability to carry out ADLs; assess patient's baseline for ADL function and identify physical deficits which impact ability to perform ADLs (bathing, care of mouth/teeth, toileting, grooming, dressing, etc.)  - Assess/evaluate cause of self-care deficits   - Assess range of motion  - Assess patient's mobility; develop plan if impaired  - Assess patient's need for assistive devices and provide as appropriate  - Encourage maximum independence but intervene and supervise when necessary  - Involve family in performance of ADLs  - Assess for home care needs following discharge   - Consider OT consult to assist with ADL evaluation and planning for discharge  - Provide patient education as appropriate  Outcome: Progressing  Goal: Maintains/Returns to pre admission functional level  Description: INTERVENTIONS:  - Perform BMAT or MOVE assessment daily.   - Set and communicate daily mobility goal to care team and patient/family/caregiver. - Collaborate with rehabilitation services on mobility goals if consulted  - Perform Range of Motion  times a day. - Reposition patient every  hours.   - Dangle patient  times a day  - Stand patient  times a day  - Ambulate patient  times a day  - Out of bed to chair  times a day   - Out of bed for meals times a day  - Out of bed for toileting  - Record patient progress and toleration of activity level   Outcome: Progressing     Problem: PAIN - ADULT  Goal: Verbalizes/displays adequate comfort level or baseline comfort level  Description: Interventions:  - Encourage patient to monitor pain and request assistance  - Assess pain using appropriate pain scale  - Administer analgesics based on type and severity of pain and evaluate response  - Implement non-pharmacological measures as appropriate and evaluate response  - Consider cultural and social influences on pain and pain management  - Notify physician/advanced practitioner if interventions unsuccessful or patient reports new pain  Outcome: Progressing     Problem: INFECTION - ADULT  Goal: Absence or prevention of progression during hospitalization  Description: INTERVENTIONS:  - Assess and monitor for signs and symptoms of infection  - Monitor lab/diagnostic results  - Monitor all insertion sites, i.e. indwelling lines, tubes, and drains  - Monitor endotracheal if appropriate and nasal secretions for changes in amount and color  - [place] appropriate cooling/warming therapies per order  - Administer medications as ordered  - Instruct and encourage patient and family to use good hand hygiene technique  - Identify and instruct in appropriate isolation precautions for identified infection/condition  Outcome: Progressing  Goal: Absence of fever/infection during neutropenic period  Description: INTERVENTIONS:  - Monitor WBC    Outcome: Progressing     Problem: SAFETY ADULT  Goal: Maintain or return to baseline ADL function  Description: INTERVENTIONS:  -  Assess patient's ability to carry out ADLs; assess patient's baseline for ADL function and identify physical deficits which impact ability to perform ADLs (bathing, care of mouth/teeth, toileting, grooming, dressing, etc.)  - Assess/evaluate cause of self-care deficits   - Assess range of motion  - Assess patient's mobility; develop plan if impaired  - Assess patient's need for assistive devices and provide as appropriate  - Encourage maximum independence but intervene and supervise when necessary  - Involve family in performance of ADLs  - Assess for home care needs following discharge   - Consider OT consult to assist with ADL evaluation and planning for discharge  - Provide patient education as appropriate  Outcome: Progressing  Goal: Maintains/Returns to pre admission functional level  Description: INTERVENTIONS:  - Perform BMAT or MOVE assessment daily.   - Set and communicate daily mobility goal to care team and patient/family/caregiver. - Collaborate with rehabilitation services on mobility goals if consulted  - Perform Range of Motion  times a day. - Reposition patient every  hours.   - Dangle patient  times a day  - Stand patient  times a day  - Ambulate patient  times a day  - Out of bed to chair  times a day   - Out of bed for meals  times a day  - Out of bed for toileting  - Record patient progress and toleration of activity level   Outcome: Progressing  Goal: Patient will remain free of falls  Description: INTERVENTIONS:  - Educate patient/family on patient safety including physical limitations  - Instruct patient to call for assistance with activity   - Consult OT/PT to assist with strengthening/mobility   - Keep Call bell within reach  - Keep bed low and locked with side rails adjusted as appropriate  - Keep care items and personal belongings within reach  - Initiate and maintain comfort rounds  - Make Fall Risk Sign visible to staff  - Offer Toileting every  Hours, in advance of need  - Initiate/Maintain alarm  - Obtain necessary fall risk management equipment:   - Apply yellow socks and bracelet for high fall risk patients  - Consider moving patient to room near nurses station  Outcome: Progressing

## 2023-11-14 NOTE — PLAN OF CARE
Problem: PAIN - ADULT  Goal: Verbalizes/displays adequate comfort level or baseline comfort level  Description: Interventions:  - Encourage patient to monitor pain and request assistance  - Assess pain using appropriate pain scale  - Administer analgesics based on type and severity of pain and evaluate response  - Implement non-pharmacological measures as appropriate and evaluate response  - Consider cultural and social influences on pain and pain management  - Notify physician/advanced practitioner if interventions unsuccessful or patient reports new pain  Outcome: Progressing     Problem: INFECTION - ADULT  Goal: Absence or prevention of progression during hospitalization  Description: INTERVENTIONS:  - Assess and monitor for signs and symptoms of infection  - Monitor lab/diagnostic results  - Monitor all insertion sites, i.e. indwelling lines, tubes, and drains  - Monitor endotracheal if appropriate and nasal secretions for changes in amount and color  - [place] appropriate cooling/warming therapies per order  - Administer medications as ordered  - Instruct and encourage patient and family to use good hand hygiene technique  - Identify and instruct in appropriate isolation precautions for identified infection/condition  Outcome: Progressing     Problem: SAFETY ADULT  Goal: Maintain or return to baseline ADL function  Description: INTERVENTIONS:  -  Assess patient's ability to carry out ADLs; assess patient's baseline for ADL function and identify physical deficits which impact ability to perform ADLs (bathing, care of mouth/teeth, toileting, grooming, dressing, etc.)  - Assess/evaluate cause of self-care deficits   - Assess range of motion  - Assess patient's mobility; develop plan if impaired  - Assess patient's need for assistive devices and provide as appropriate  - Encourage maximum independence but intervene and supervise when necessary  - Involve family in performance of ADLs  - Assess for home care needs following discharge   - Consider OT consult to assist with ADL evaluation and planning for discharge  - Provide patient education as appropriate  Outcome: Progressing     Problem: Knowledge Deficit  Goal: Patient/family/caregiver demonstrates understanding of disease process, treatment plan, medications, and discharge instructions  Description: Complete learning assessment and assess knowledge base.   Interventions:  - Provide teaching at level of understanding  - Provide teaching via preferred learning methods  Outcome: Progressing

## 2023-11-14 NOTE — ASSESSMENT & PLAN NOTE
Episode of presyncopal attack on 11/13.   Readings positive for orthostatic hypotension, status post IVF   Resume carvedilol, amlodipine and amiloride once stable

## 2023-11-15 ENCOUNTER — TELEPHONE (OUTPATIENT)
Dept: FAMILY MEDICINE CLINIC | Facility: HOSPITAL | Age: 63
End: 2023-11-15

## 2023-11-15 ENCOUNTER — PATIENT OUTREACH (OUTPATIENT)
Dept: CASE MANAGEMENT | Facility: OTHER | Age: 63
End: 2023-11-15

## 2023-11-15 NOTE — TELEPHONE ENCOUNTER
Per marichuy @ [phone] - pls call to advise when to have sutures removed    Hi, this is Nicole Lopez. Mateo Horta. Birthday is [birth year]. I had gotten stitches last Thursday in the hospital and I'm not sure when I shouldn't take it out. So could you ask Doctor Anmol Rojas how long delete my name? My number is [phone]. Thank you.

## 2023-11-15 NOTE — TELEPHONE ENCOUNTER
FYI- Patient is currently in [facility]. I spoke with Lucas Her and advised that she should have the nursing staff there check with the physician over seeing her. She stated that she is being discharged on Friday. I advised that if the stitches need to come out before Friday that they will most likely remove them there. If not, she should call us when she is discharged for an appointment here to follow up.

## 2023-11-15 NOTE — PROGRESS NOTES
Outpatient care management referral via HRR report 11/15/2023. Discharged 11/14/23 to [facility]. Email sent to facility to inform them I will be following the patient during their skilled stay. This Admin Coordinator will continue to monitor via chart review.

## 2023-11-17 ENCOUNTER — PATIENT OUTREACH (OUTPATIENT)
Dept: CASE MANAGEMENT | Facility: OTHER | Age: 63
End: 2023-11-17

## 2023-11-17 ENCOUNTER — TELEPHONE (OUTPATIENT)
Dept: FAMILY MEDICINE CLINIC | Facility: HOSPITAL | Age: 63
End: 2023-11-17

## 2023-11-17 ENCOUNTER — OFFICE VISIT (OUTPATIENT)
Dept: FAMILY MEDICINE CLINIC | Facility: HOSPITAL | Age: 63
End: 2023-11-17
Payer: MEDICARE

## 2023-11-17 ENCOUNTER — TRANSITIONAL CARE MANAGEMENT (OUTPATIENT)
Dept: FAMILY MEDICINE CLINIC | Facility: HOSPITAL | Age: 63
End: 2023-11-17

## 2023-11-17 VITALS
DIASTOLIC BLOOD PRESSURE: 82 MMHG | BODY MASS INDEX: 33.59 KG/M2 | HEIGHT: 67 IN | HEART RATE: 87 BPM | OXYGEN SATURATION: 94 % | SYSTOLIC BLOOD PRESSURE: 140 MMHG | WEIGHT: 214 LBS

## 2023-11-17 DIAGNOSIS — F31.81 BIPOLAR 2 DISORDER (HCC): ICD-10-CM

## 2023-11-17 DIAGNOSIS — Z76.89 ENCOUNTER FOR SUPPORT AND COORDINATION OF TRANSITION OF CARE: Primary | ICD-10-CM
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
## 2023-11-17 DIAGNOSIS — N18.4 CKD (CHRONIC KIDNEY DISEASE) STAGE 4, GFR 15-29 ML/MIN (HCC): ICD-10-CM

## 2023-11-17 DIAGNOSIS — G44.319 ACUTE POST-TRAUMATIC HEADACHE, NOT INTRACTABLE: ICD-10-CM

## 2023-11-17 DIAGNOSIS — S01.81XD LACERATION OF SKIN OF FACE, SUBSEQUENT ENCOUNTER: ICD-10-CM

## 2023-11-17 DIAGNOSIS — N18.4 CKD (CHRONIC KIDNEY DISEASE) STAGE 4, GFR 15-29 ML/MIN (HCC): Primary | ICD-10-CM

## 2023-11-17 DIAGNOSIS — I10 ESSENTIAL HYPERTENSION: ICD-10-CM

## 2023-11-17 PROCEDURE — 99496 TRANSJ CARE MGMT HIGH F2F 7D: CPT | Performed by: STUDENT IN AN ORGANIZED HEALTH CARE EDUCATION/TRAINING PROGRAM

## 2023-11-17 RX ORDER — HYDROCODONE BITARTRATE AND ACETAMINOPHEN 5; 325 MG/1; MG/1
1 TABLET ORAL 2 TIMES DAILY PRN
Qty: 20 TABLET | Refills: 0 | Status: SHIPPED | OUTPATIENT
Start: 2023-11-17

## 2023-11-17 NOTE — TELEPHONE ENCOUNTER
Dr gallardo, pls address. Looks like maybe interaction they have to report. Also thye want to know about an additional med that they need to document. I tried reading between the lines here, little confusing. Sorry!

## 2023-11-17 NOTE — PROGRESS NOTES
Chart review complete Update obtained from [facility]). The patient is currently admitted to the SNF and is receiving skilled therapies No LCD at this time. This Admin Coordinator will continue to monitor via chart review. Email alert received from [facility] the patient discharged 11/17/23 to Home. A email was sent to the facility requesting discharge instructions. When Dimitrios Cherise has received the Discharge paperwork  Admin Coordinator will attach to this encounter. I have removed myself off of the care team, added the CM to the care team who will follow the patient through the episode, sent the care manager an inbasket notifying them of the HRR Referal.  Ambulatory referral placed for complex care management. This office note has been dictated.  CONFIRMATION #:[id number]

## 2023-11-17 NOTE — PROGRESS NOTES
[address], DO  Transition of Care Management Follow Up  Assessment/Plan:      Diagnosis ICD-10-CM Associated Orders   1. Encounter for support and coordination of transition of care  Z76.89       2. Fall on stairs, initial encounter  W10. 9XXA HYDROcodone-acetaminophen (Norco) 5-325 mg per tablet      3. Laceration of skin of face, subsequent encounter  S01.81XD       4. CKD (chronic kidney disease) stage 4, GFR 15-29 ml/min (HCC)  N18.4       5. Essential hypertension  I10       6. Bipolar 2 disorder ([address] )  F31.81       7. Acute post-traumatic headache, not intractable  G44.319 HYDROcodone-acetaminophen (Norco) 5-325 mg per tablet        C/W water intake increase per d/c summary. Stitch removal done today, no complications. Please call with any questions or concerns as needed. PDMP reviewed. Patient's medications were reviewed and reconciled. Ordered/Re-ordered as above. Return if symptoms worsen or fail to improve - PCP visit nxt month.    ______________________________________________________________________  History of Present Illness   Nishant Geiger is here for follow up of their hospitalization/transition of care appointment. HPI  Admitted to [facility] for fall, was carrying items, not using rail & fell. Laceration above R eye. Well healed incision. Small scab.   6 stitches done 7d ago. Not doing dialysis, CKD is doing better. Chronic kidney disease, stage IV  - creatinine fluctuates but seems to have some progression estimated GFR now consistently below 20    BP stable today as it was at [facility] last month. No longer needing PT or OT per d/c.      Wt Readings from Last 3 Encounters:   11/17/23 97.1 kg (214 lb)   10/27/23 98.4 kg (217 lb)   10/13/23 99.8 kg (220 lb)     Temp Readings from Last 3 Encounters:   11/14/23 97.7 °F (36.5 °C)   08/27/23 97.8 °F (36.6 °C) (Temporal)   08/10/23 98.9 °F (37.2 °C) (Tympanic)     BP Readings from Last 3 Encounters:   11/17/23 140/82   11/14/23 136/73   10/27/23 140/70     Pulse Readings from Last 3 Encounters:   11/17/23 87   11/14/23 84   10/27/23 88         TCM Call       Date and time call was made  11/17/2023  1:53 PM    Hospital care reviewed  Records reviewed    Patient was hospitialized at  [address] [place] [address] Edith    Date of Admission  11/10/23    Date of discharge  11/14/23    Diagnosis  Concussion without loss of consciousness    Disposition  Rehabilitation center; Home  d/c from [facility] 11/17    Were the patients medications reviewed and updated  Yes    Current Symptoms  None    Vomitting severity  Mild          TCM Call       Post hospital issues  None    Should patient be enrolled in anticoag monitoring? No    Scheduled for follow up? Yes    Patients specialists  Endocrinologist; Nephrologist    Nephrologist name  Dangelo Denise, DO     Other specialists names  Cecilio Rayo MD     Did you obtain your prescribed medications  Yes    Do you need help managing your prescriptions or medications  No    Is transportation to your appointment needed  No    I have advised the patient to call PCP with any new or worsening symptoms  [address]; Family    Are you recieving any outpatient services  No    Are you recieving home care services  No    Comments  SPOKE TO PT.  DOING BETTER. MEDS REVIEWED, CHART CURRENT. SENT TO FRONT TO SCHED TCM. DK            The following portions of the patient's history were reviewed and updated as appropriate: allergies, current medications, past family history, past medical history, past social history, past surgical history and problem list.    Review of Systems   Review of Systems   Constitutional:  Negative for chills and fever. Respiratory:  Negative for cough and shortness of breath. Cardiovascular:  Negative for chest pain, palpitations and leg swelling.    Neurological: Positive for light-headedness (once a while) and headaches (only since her fall).        Past Medical History     Past Medical History:   Diagnosis Date    Anxiety     Anxiety disorder     Arthritis     At risk for falls     Bipolar 2 disorder ([address])     Chronic back pain     Chronic kidney disease     Closed fracture of distal end of right fibula with routine healing 2020    COVID-19     in 2021    CVA (cerebral vascular accident) [facility])     noted on MRI in the past    Depression     GERD (gastroesophageal reflux disease)     Hypercholesteremia     Hypernatremia     Hypertension     Hypokalemia     Idiopathic chronic pancreatitis ([address]) 2018    Intervertebral disc disorder with radiculopathy of lumbosacral region     resolved: 2015    Kidney disease     Kidney disease     Limb alert care status     LUE-fistula    Panic attacks     Pericardial effusion     PONV (postoperative nausea and vomiting)     Psychiatric problem     Radiculitis     resolved: 2015    Secondary renal hyperparathyroidism ([address])     Stroke ([address])     Vitamin D deficiency        Past Surgical History     Past Surgical History:   Procedure Laterality Date    BUNIONECTOMY      Left foot     CAST APPLICATION Right     Procedure: Application short-arm thumb spica splint;  Surgeon: Cindi Haji MD;  Location:  MAIN OR;  Service: Orthopedics    COLON SURGERY      COLONOSCOPY  2021    DILATION AND CURETTAGE OF UTERUS      INDUCED       surgically induced    AZ ARTERIOVENOUS ANASTOMOSIS OPEN DIRECT Left 2019    Procedure: CREATION FISTULA ARTERIOVENOUS (AV) left wrists possible left upper;  Surgeon: Celso Shanks MD;  Location:  MAIN OR;  Service: Vascular    AZ Ananda W/SESMDC W/DIST Luis Qualia Left 2019    Procedure: Mckayla Grajeda;  Surgeon: Hardeep Benito DPM;  Location: [facility] MAIN OR;  Service: Podiatry    AZ Huberrt W/SESMDC W/DIST Luis Qualia Right 8/3/2020 Procedure: Myriam Ohara;  Surgeon: Elian Hayden DPM;  Location: [facility] MAIN OR;  Service: Podiatry    VT [place] W/SESMDC W/PROX PHLNX OSTEOT Right 9/27/2021    Procedure: Vitaly Vidal, right tameka osteotomy and 2nd claw toe correction;  Surgeon: Mikey Duque MD;  Location: [facility] MAIN OR;  Service: Orthopedics    VT ERCP DX COLLECTION SPECIMEN BRUSHING/WASHING N/A 4/11/2018    Procedure: ENDOSCOPIC RETROGRADE CHOLANGIOPANCREATOGRAPHY (ERCP); Surgeon: Mendel Cullens, MD;  Location: [facility] MAIN OR;  Service: Gastroenterology    VT LAPAROSCOPY PROCTOPEXY PROLAPSE N/A 7/13/2018    Procedure: ROBOTIC SIGMOID RESECTION / RECTOPEXY;  Surgeon: May Alfredo MD;  Location: [facility] MAIN OR;  Service: Colorectal    VT OPEN TREATMENT RADIAL SHAFT FRACTURE Right 10/11/2021    Procedure: OPEN REDUCTION W/ INTERNAL FIXATION (ORIF) RADIUS (WRIST), RIGHT DISTAL;  Surgeon: Ellyn Hobbs MD;  Location: [facility] MAIN [facility];  Service: Orthopedics    VT REMOVAL IMPLANT DEEP Right 6/23/2022    Procedure: Removal of hardware volar aspect right distal radius (distal radial plate and screws);   Surgeon: Arnoldo Holly MD;  Location: [facility];  Service: Orthopedics    VT SIGMOIDOSCOPY FLX DX W/COLLJ SPEC BR/WA IF PFRMD N/A 7/13/2018    Procedure: Joby Warren;  Surgeon: May Alfredo MD;  Location: [facility] MAIN OR;  Service: Colorectal    VT TR TDN RESTORE INTRNSC FUNCJ ALL 4 FNGRS Right 6/23/2022    Procedure: Right ring finger flexor digitorum superficialis to flexor pollicis longus tendon transfer;  Surgeon: Arnoldo Holly MD;  Location: [facility]IN [facility];  Service: Orthopedics    TUBAL LIGATION Bilateral [address] [place] [address] THYROID BIOPSY  7/30/2019       Social History     Social History     Socioeconomic History    Marital status:      Spouse name: None    Number of children: None    Years of education: None    Highest education level: None   Occupational History    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   Tobacco Use    Smoking status: Never    Smokeless tobacco: Never   Vaping Use    Vaping Use: Never used   Substance and Sexual Activity    Alcohol use: Never    Drug use: Not Currently    Sexual activity: Not Currently   Other Topics Concern    None   Social History Narrative    Daily caffeine consumption 2-3 servings a day    Lives with family. No living will. Has dentures---no dental care. Primary language--English. Feels safe at home. Social Determinants of Health     Financial Resource Strain: Medium Risk (12/7/2022)    Overall Financial Resource Strain (CARDIA)     Difficulty of Paying Living Expenses: Somewhat hard   Food Insecurity: No Food Insecurity (11/13/2023)    Hunger Vital Sign     Worried About Running Out of Food in the Last Year: Never true     Ran Out of Food in the Last Year: Never true   Transportation Needs: No Transportation Needs (11/13/2023)    PRAPARE - Transportation     Lack of Transportation (Medical): No     Lack of Transportation (Non-Medical): No   Physical Activity: Inactive (4/19/2021)    Exercise Vital Sign     Days of Exercise per Week: 0 days     Minutes of Exercise per Session: 0 min   Stress: Stress Concern Present (4/19/2021)    [facility]     Feeling of Stress :  To some extent   Social Connections: Not on file   Intimate Partner Violence: Not At Risk (4/19/2021)    Humiliation, Afraid, Rape, and Kick questionnaire     Fear of Current or Ex-Partner: No     Emotionally Abused: No     Physically Abused: No     Sexually Abused: No   Housing Stability: Low Risk  (11/13/2023)    Housing Stability Vital Sign     Unable to Pay for Housing in the Last Year: No     Number of Places Lived in the Last Year: 1     Unstable Housing in the Last Year: No       Family History     Family History   Problem Relation Age of Onset    Bipolar disorder Mother     Mental illness Mother         depression    Stroke Mother Dementia Mother     Colon polyps Mother     Heart disease Father     Hypertension Father     Diabetes Father     Other Family         Back disorder    Diabetes Family     Heart disease Family     Hypertension Family     Stroke Family     Thyroid disease Family     Breast cancer Paternal Grandmother         age unknown    Breast cancer Paternal Aunt         age unknown    Breast cancer Maternal Aunt         age unknown    Mental illness Sister     Colon polyps Sister     Mental illness Sister     Heart disease Sister     No Known Problems Sister     Breast cancer Sister 76    Other Son         pituitary tumor    Hypertension Son     Obesity Son     No Known Problems Son     No Known Problems Maternal Grandmother     No Known Problems Maternal Grandfather     No Known Problems Paternal Grandfather     Breast cancer Paternal Aunt         age unknown    Substance Abuse Neg Hx         neg fam hx    Colon cancer Neg Hx        Current Medications     Current Outpatient Medications:     acetaminophen (TYLENOL) 650 mg CR tablet, Take 1 tablet (650 mg total) by mouth every 8 (eight) hours as needed for mild pain (Patient taking differently: Take 500 mg by mouth every 8 (eight) hours as needed for mild pain), Disp: 30 tablet, Rfl: 0    albuterol (Ventolin HFA) 90 mcg/act inhaler, Inhale 2 puffs every 6 (six) hours as needed for wheezing or shortness of breath, Disp: 8.5 g, Rfl: 3    AMILoride 5 mg tablet, Take 1 tablet (5 mg total) by mouth 2 (two) times a day, Disp: 180 tablet, Rfl: 3    amLODIPine (NORVASC) 5 mg tablet, Take 1 tablet (5 mg total) by mouth daily, Disp: 90 tablet, Rfl: 3    aspirin 81 mg chewable tablet, Chew 1 tablet (81 mg total) daily, Disp: , Rfl:     budesonide-formoterol (Symbicort) 160-4.5 mcg/act inhaler, Inhale 2 puffs 2 (two) times a day Rinse mouth after use., Disp: 10.2 g, Rfl: 11    carvedilol (COREG) 25 mg tablet, Take 1 tablet (25 mg total) by mouth 2 (two) times a day with meals, Disp: 180 tablet, Rfl: 3    cholecalciferol (VITAMIN D3) 1,000 units tablet, Take 5 tablets (5,000 Units total) by mouth daily, Disp: 90 tablet, Rfl: 5    clonazePAM (KlonoPIN) 0.5 mg tablet, Take 1 tablet (0.5 mg total) by mouth 3 (three) times a day, Disp: 270 tablet, Rfl: 0    Cyanocobalamin (VITAMIN B 12 PO), Take by mouth, Disp: , Rfl:     fluvoxaMINE (LUVOX) 100 mg tablet, Fluvoxamine 100m tablet in the morning ; 2 tablets at night, Disp: 270 tablet, Rfl: 1    haloperidol (HALDOL) 2 mg tablet, Haloperidol 2m tablet po daily x 1wk.  Then 1 tablet in morning and 1 tablet at night, Disp: 60 tablet, Rfl: 1    HYDROcodone-acetaminophen (Norco) 5-325 mg per tablet, Take 1 tablet by mouth 2 (two) times a day as needed for pain Max Daily Amount: 2 tablets, Disp: 20 tablet, Rfl: 0    lamoTRIgine (LaMICtal) 200 MG tablet, Lamotrigine 200m tablet in the morning and 1 tablet at night, Disp: 180 tablet, Rfl: 0    omeprazole (PriLOSEC) 40 MG capsule, Take 1 capsule (40 mg total) by mouth daily before breakfast, Disp: 90 capsule, Rfl: 3    QUEtiapine (SEROquel) 100 mg tablet, Quetiapine 100mg : 1 tablet + 400mg po at night, Disp: 90 tablet, Rfl: 0    QUEtiapine (SEROquel) 300 mg tablet, Quetiapine 300m tablet po  in morning, Disp: 90 tablet, Rfl: 0    rosuvastatin (CRESTOR) 20 MG tablet, TAKE 1 TABLET BY MOUTH IN  THE EVENING, Disp: 90 tablet, Rfl: 3    traMADol (Ultram) 50 mg tablet, Take 1 tablet (50 mg total) by mouth every 12 (twelve) hours as needed for moderate pain, Disp: 60 tablet, Rfl: 0    venlafaxine (EFFEXOR-XR) 150 mg 24 hr capsule, Venlafaxine HCL ER 150m capsule po daily in the morning, Disp: 30 capsule, Rfl: 1     Allergies     Allergies   Allergen Reactions    Molds & Smuts Nasal Congestion    Bee Pollen Nasal Congestion     Other reaction(s): Nasal Congestion    Pollen Extract Nasal Congestion       Objective   /82   Pulse 87   Ht 5' 7" (1.702 m)   Wt 97.1 kg (214 lb)   LMP  (LMP Unknown) SpO2 94%   BMI 33.52 kg/m²   Wt Readings from Last 3 Encounters:   11/17/23 97.1 kg (214 lb)   10/27/23 98.4 kg (217 lb)   10/13/23 99.8 kg (220 lb)     BP Readings from Last 3 Encounters:   11/17/23 140/82   11/14/23 136/73   10/27/23 140/70     Pulse Readings from Last 3 Encounters:   11/17/23 87   11/14/23 84   10/27/23 88     Body mass index is 33.52 kg/m². Physical Exam  Vitals and nursing note reviewed. Constitutional:       General: She is not in acute distress. Appearance: Normal appearance. She is not ill-appearing. HENT:      Head: Normocephalic and atraumatic. Left Ear: Ear canal and external ear normal. There is no impacted cerumen. Eyes:      General: No scleral icterus. Right eye: No discharge. Left eye: No discharge. Cardiovascular:      Rate and Rhythm: Normal rate and regular rhythm. Pulses: Normal pulses. Heart sounds: Normal heart sounds. No murmur heard. Pulmonary:      Effort: Pulmonary effort is normal. No respiratory distress. Breath sounds: Normal breath sounds. No stridor. No wheezing. Musculoskeletal:      Cervical back: Normal range of motion and neck supple. No rigidity. Right lower leg: No edema. Left lower leg: No edema. Neurological:      Mental Status: She is alert and oriented to person, place, and time. Gait: Gait normal.   Psychiatric:         Mood and Affect: Mood normal.         Behavior: Behavior normal.         Thought Content:  Thought content normal.         Judgment: Judgment normal.

## 2023-11-17 NOTE — TELEPHONE ENCOUNTER
Hi, my name is Nicholas Sanches. I'm calling from [address] ([employer]) in [address] I'm calling in regards to a mutual patient. eDvi Red birthday is 1960 with a drug drug interaction for pain medication and tramadol. I wasn't sure if you guys were aware. She is taking both. So if you can just give us a call back so we can update our notes. It's [phone] and just ask for the pharmacist. Thank you. You received a voice mail from [facility].

## 2023-11-17 NOTE — TELEPHONE ENCOUNTER
Hey, this is Jonathan Francisco calling from the [facility] DR KARTHIKEYAN HIGGINBOTHAM in [facility] It's [phone]. I'm trying to fill a prescription for Shantel Galindo. Kajal Abbasi's YOB: 1960. There's a prescription sent over for the your codone. I'm assuming that it's in addition to her tramadol that she takes daily, but I the insurance is requiring me to document whether it's in addition to or replacing. So if someone could call me back so I can get that ready for her, I would appreciate it. It's the [address] in [facility] [phone] [id number]. And if you have it while you guys are calling back, we just need to know the last time that you were seen in the office. All right. Thanks so much. Take care. You received a voice mail from [facility].

## 2023-11-19 LAB
ATRIAL RATE: 77 BPM
P AXIS: 62 DEGREES
PR INTERVAL: 142 MS
QRS AXIS: 112 DEGREES
QRSD INTERVAL: 136 MS
QT INTERVAL: 426 MS
QTC INTERVAL: 482 MS
T WAVE AXIS: 41 DEGREES
VENTRICULAR RATE: 77 BPM

## 2023-11-19 PROCEDURE — 93010 ELECTROCARDIOGRAM REPORT: CPT | Performed by: INTERNAL MEDICINE

## 2023-11-20 ENCOUNTER — PATIENT OUTREACH (OUTPATIENT)
Dept: FAMILY MEDICINE CLINIC | Facility: HOSPITAL | Age: 63
End: 2023-11-20

## 2023-11-20 NOTE — PROGRESS NOTES
Outpatient Care Management Note:    Care manager called Ricardo Madrigal, introduced myself and the care management program.  Ricardo Madrigal denies any needs. She states that she has all medications and declined completing a med review. She declined my contact information. She will call the office with any questions.  CM will close the referral.

## 2023-11-21 ENCOUNTER — SOCIAL WORK (OUTPATIENT)
Dept: BEHAVIORAL/MENTAL HEALTH CLINIC | Facility: CLINIC | Age: 63
End: 2023-11-21
Payer: MEDICARE

## 2023-11-21 DIAGNOSIS — F31.4 SEVERE DEPRESSED BIPOLAR I DISORDER WITHOUT PSYCHOTIC FEATURES (HCC): Primary | ICD-10-CM

## 2023-11-21 DIAGNOSIS — F42.2 MIXED OBSESSIONAL THOUGHTS AND ACTS: ICD-10-CM

## 2023-11-21 PROCEDURE — 90834 PSYTX W PT 45 MINUTES: CPT | Performed by: COUNSELOR

## 2023-11-21 NOTE — PSYCH
Behavioral Health Psychotherapy Progress Note    Psychotherapy Provided: Individual Psychotherapy     1. Severe depressed bipolar I disorder without psychotic features ([address])        2. Mixed obsessional thoughts and acts            Goals addressed in session: Goal 1     DATA: Papito Shultz said she fell again "down 13 steps" on 11-10-23. She said she needed stitches by her right eye. Papito Shultz said she had to go to rehab to work on coordination with walking but said "they told me I didn't need it."  She is now staying on the first floor again (lives with sister and niece) and is frustrated saying had just moved back upstairs to her room after some medical issues and liked the privacy. Papito Shultz talked about her two sons and her anguish that they doing include her at all in their lives. She continues to say "I have no idea what I might have done to turn them away from me."  Papito Shultz also mentioned today that her psychiatric medication is helping her feel less depressed but said "sometimes I still feel like someone is following me or that others are talking about me."  Papito Shultz said her sister told her to tell this clinician that she's "been really luzma lately."  Papito Shultz said "I don't have my dog anymore ( this past August) and I don't have 'Kathrin' to talk to anymore so I'm talking more to them (sister and niece)."  During this session, this clinician used the following therapeutic modalities: Supportive Psychotherapy    Substance Abuse was not addressed during this session. If the client is diagnosed with a co-occurring substance use disorder, please indicate any changes in the frequency or amount of use: N/A. Stage of change for addressing substance use diagnoses: No substance use/Not applicable    ASSESSMENT:  Alberto Irene presents with a Euthymic/ normal mood. her affect is Normal range and intensity, which is congruent, with her mood and the content of the session.  The client has made progress on their goals.    Lavon Trevizo presents with a none risk of suicide, none risk of self-harm, and none risk of harm to others. For any risk assessment that surpasses a "low" rating, a safety plan must be developed. A safety plan was indicated: no  If yes, describe in detail N/A    PLAN: Between sessions, Lavon Trevizo will practice good self care. She said she wants to not focus on her two sons and if she does, bring her attention and thoughts back to what she is doing presently . At the next session, the therapist will use Supportive Psychotherapy to address goals/needs/concerns. Behavioral Health Treatment Plan and Discharge Planning: Lavon Trevizo is aware of and agrees to continue to work on their treatment plan. They have identified and are working toward their discharge goals.  yes    Visit start and stop times:    11/21/23  Start Time: 1100  Stop Time: 1151  Total Visit Time: 51 minutes

## 2023-11-28 DIAGNOSIS — I10 HTN (HYPERTENSION): ICD-10-CM

## 2023-11-28 RX ORDER — AMILORIDE HYDROCHLORIDE 5 MG/1
5 TABLET ORAL 2 TIMES DAILY
Qty: 180 TABLET | Refills: 3 | Status: SHIPPED | OUTPATIENT
Start: 2023-11-28

## 2023-11-29 ENCOUNTER — OFFICE VISIT (OUTPATIENT)
Dept: PODIATRY | Facility: CLINIC | Age: 63
End: 2023-11-29

## 2023-11-29 ENCOUNTER — TELEPHONE (OUTPATIENT)
Dept: NEPHROLOGY | Facility: CLINIC | Age: 63
End: 2023-11-29

## 2023-11-29 VITALS
WEIGHT: 214 LBS | HEIGHT: 67 IN | SYSTOLIC BLOOD PRESSURE: 128 MMHG | HEART RATE: 88 BPM | DIASTOLIC BLOOD PRESSURE: 77 MMHG | BODY MASS INDEX: 33.59 KG/M2

## 2023-11-29 DIAGNOSIS — B35.1 ONYCHOMYCOSIS: Primary | ICD-10-CM

## 2023-11-29 DIAGNOSIS — M79.604 BILATERAL LEG PAIN: ICD-10-CM

## 2023-11-29 DIAGNOSIS — M54.50 LUMBAR PAIN: ICD-10-CM

## 2023-11-29 DIAGNOSIS — M79.605 BILATERAL LEG PAIN: ICD-10-CM

## 2023-11-29 PROCEDURE — NCFTCARE PR NON-COVERED FOOT CARE: Performed by: PODIATRIST

## 2023-11-29 NOTE — TELEPHONE ENCOUNTER
Pt called and would like to know if she should be repeating any lab work monthly. Please advise. Thanks!

## 2023-11-29 NOTE — PROGRESS NOTES
PATIENT:  Allison Ramos [facility]  1960    ASSESSMENT/PLAN:  1. Onychomycosis                 The patient was educated in her diagnosis. Instructed skin care and protection. Nails trimmed. She wishes to have periodic foot care and RA in 12 weeks. HPI:  Belen Encarnacion is a 66 y. o.year old female seen for chief complaint of elongated nails. She did not trim her toenails since the last visit. The patient denied any acute pedal disorder.       PAST MEDICAL HISTORY:  Past Medical History:   Diagnosis Date    Anxiety     Anxiety disorder     Arthritis     At risk for falls     Bipolar 2 disorder ([address])     Chronic back pain     Chronic kidney disease     Closed fracture of distal end of right fibula with routine healing 2020    COVID-19     in 2021    CVA (cerebral vascular accident) [facility])     noted on MRI in the past    Depression     GERD (gastroesophageal reflux disease)     Hypercholesteremia     Hypernatremia     Hypertension     Hypokalemia     Idiopathic chronic pancreatitis ([address]) 2018    Intervertebral disc disorder with radiculopathy of lumbosacral region     resolved: 2015    Kidney disease     Kidney disease     Limb alert care status     LUE-fistula    Panic attacks     Pericardial effusion     PONV (postoperative nausea and vomiting)     Psychiatric problem     Radiculitis     resolved: 2015    Secondary renal hyperparathyroidism ([address])     Stroke ([address])     Vitamin D deficiency        PAST SURGICAL HISTORY:  Past Surgical History:   Procedure Laterality Date    BUNIONECTOMY      Left foot     CAST APPLICATION Right [id number]    Procedure: Application short-arm thumb spica splint;  Surgeon: Misael Ambrocio MD;  Location:  MAIN OR;  Service: Orthopedics    COLON SURGERY      COLONOSCOPY  2021    DILATION AND CURETTAGE OF UTERUS      INDUCED       surgically induced    NM ARTERIOVENOUS ANASTOMOSIS OPEN DIRECT Left 2019 Procedure: CREATION FISTULA ARTERIOVENOUS (AV) left wrists possible left upper;  Surgeon: Grady Appiah MD;  Location: [facility] MAIN [facility];  Service: Vascular    [place] [facility] [place] Left 7/1/2019    Procedure: Daljit Nelson;  Surgeon: Jarvis Patricia DPM;  Location: [facility] MAIN OR;  Service: [address] W/[facility] W/DIST [place] Right 8/3/2020    Procedure: Kinza Mccarty;  Surgeon: Jarvis Patricia DPM;  Location: [facility] MAIN [facility];  Service: Podiatry    [place] [facility] W/[facility] [place] PHLNX OSTEOT Right 9/27/2021    Procedure: [place] Brito, right tameka osteotomy and 2nd claw toe correction;  Surgeon: Yara Stewart MD;  Location: [facility] MAIN [facility];  Service: Orthopedics    IL ERCP DX COLLECTION SPECIMEN BRUSHING/WASHING N/A 4/11/2018    Procedure: ENDOSCOPIC RETROGRADE CHOLANGIOPANCREATOGRAPHY (ERCP); Surgeon: Cammie Altamirano MD;  Location: [facility];  Service: Gastroenterology    IL LAPAROSCOPY PROCTOPEXY PROLAPSE N/A 7/13/2018    Procedure: ROBOTIC SIGMOID RESECTION / RECTOPEXY;  Surgeon: Ayleen Hernandez MD;  Location: [facility];  Service: Colorectal    IL OPEN TREATMENT RADIAL SHAFT FRACTURE Right 10/11/2021    Procedure: OPEN REDUCTION W/ INTERNAL FIXATION (ORIF) RADIUS (WRIST), RIGHT DISTAL;  Surgeon: Jefe Fabian MD;  Location: [facility];  Service: Orthopedics    IL REMOVAL IMPLANT DEEP Right 6/23/2022    Procedure: Removal of hardware volar aspect right distal radius (distal radial plate and screws);   Surgeon: Abril Taylor MD;  Location: [facility];  Service: Orthopedics    [place] SIGMOIDOSCOPY FLX DX W/COLLJ SPEC BR/WA IF PFRMD N/A 7/13/2018    Procedure: Jose Relic;  Surgeon: Ayleen Hernandez MD;  Location: [facility];  Service: Colorectal    IL TR TDN RESTORE INTRNSC FUNCJ ALL 4 FNGRS Right 6/23/2022    Procedure: Right ring finger flexor digitorum superficialis to flexor pollicis longus tendon transfer;  Surgeon: Thelma Wong Aleisha Dave MD;  Location:  MAIN OR;  Service: Orthopedics    TUBAL LIGATION Bilateral [facility] THYROID BIOPSY  2019        ALLERGIES:  Molds & smuts, Bee pollen, and Pollen extract    MEDICATIONS:  Current Outpatient Medications   Medication Sig Dispense Refill    acetaminophen (TYLENOL) 650 mg CR tablet Take 1 tablet (650 mg total) by mouth every 8 (eight) hours as needed for mild pain (Patient taking differently: Take 500 mg by mouth every 8 (eight) hours as needed for mild pain) 30 tablet 0    albuterol (Ventolin HFA) 90 mcg/act inhaler Inhale 2 puffs every 6 (six) hours as needed for wheezing or shortness of breath 8.5 g 3    AMILoride 5 mg tablet Take 1 tablet (5 mg total) by mouth 2 (two) times a day 180 tablet 3    amLODIPine (NORVASC) 5 mg tablet Take 1 tablet (5 mg total) by mouth daily 90 tablet 3    aspirin 81 mg chewable tablet Chew 1 tablet (81 mg total) daily      budesonide-formoterol (Symbicort) 160-4.5 mcg/act inhaler Inhale 2 puffs 2 (two) times a day Rinse mouth after use. 10.2 g 11    carvedilol (COREG) 25 mg tablet Take 1 tablet (25 mg total) by mouth 2 (two) times a day with meals 180 tablet 3    cholecalciferol (VITAMIN D3) 1,000 units tablet Take 5 tablets (5,000 Units total) by mouth daily 90 tablet 5    clonazePAM (KlonoPIN) 0.5 mg tablet Take 1 tablet (0.5 mg total) by mouth 3 (three) times a day 270 tablet 0    Cyanocobalamin (VITAMIN B 12 PO) Take by mouth      fluvoxaMINE (LUVOX) 100 mg tablet Fluvoxamine 100m tablet in the morning ; 2 tablets at night 270 tablet 1    haloperidol (HALDOL) 2 mg tablet Haloperidol 2m tablet po daily x 1wk.  Then 1 tablet in morning and 1 tablet at night 60 tablet 1    HYDROcodone-acetaminophen (Norco) 5-325 mg per tablet Take 1 tablet by mouth 2 (two) times a day as needed for pain Max Daily Amount: 2 tablets 20 tablet 0    lamoTRIgine (LaMICtal) 200 MG tablet Lamotrigine 200m tablet in the morning and 1 tablet at night 180 tablet 0    omeprazole (PriLOSEC) 40 MG capsule Take 1 capsule (40 mg total) by mouth daily before breakfast 90 capsule 3    QUEtiapine (SEROquel) 100 mg tablet Quetiapine 100mg : 1 tablet + 400mg po at night 90 tablet 0    QUEtiapine (SEROquel) 300 mg tablet Quetiapine 300m tablet po  in morning 90 tablet 0    rosuvastatin (CRESTOR) 20 MG tablet TAKE 1 TABLET BY MOUTH IN  THE EVENING 90 tablet 3    traMADol (Ultram) 50 mg tablet Take 1 tablet (50 mg total) by mouth every 12 (twelve) hours as needed for moderate pain 60 tablet 0    venlafaxine (EFFEXOR-XR) 150 mg 24 hr capsule Venlafaxine HCL ER 150m capsule po daily in the morning 30 capsule 1     No current facility-administered medications for this visit. SOCIAL HISTORY:  Social History     Socioeconomic History    Marital status:      Spouse name: None    Number of children: None    Years of education: None    Highest education level: None   Occupational History    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   Tobacco Use    Smoking status: Never    Smokeless tobacco: Never   Vaping Use    Vaping Use: Never used   Substance and Sexual Activity    Alcohol use: Never    Drug use: Not Currently    Sexual activity: Not Currently   Other Topics Concern    None   Social History Narrative    Daily caffeine consumption 2-3 servings a day    Lives with family. No living will. Has dentures---no dental care. Primary language--English. Feels safe at home. Social Determinants of Health     Financial Resource Strain: Medium Risk (2022)    Overall Financial Resource Strain (CARDIA)     Difficulty of Paying Living Expenses: Somewhat hard   Food Insecurity: No Food Insecurity (2023)    Hunger Vital Sign     Worried About Running Out of Food in the Last Year: Never true     Ran Out of Food in the Last Year: Never true   Transportation Needs: No Transportation Needs (2023)    PRAPARE - Transportation     Lack of Transportation (Medical):  No Lack of Transportation (Non-Medical): No   Physical Activity: Inactive (4/19/2021)    Exercise Vital Sign     Days of Exercise per Week: 0 days     Minutes of Exercise per Session: 0 min   Stress: Stress Concern Present (4/19/2021)    [address] [facility]     Feeling of Stress : To some extent   Social Connections: Not on file   Intimate Partner Violence: Not At Risk (4/19/2021)    Humiliation, Afraid, Rape, and Kick questionnaire     Fear of Current or Ex-Partner: No     Emotionally Abused: No     Physically Abused: No     Sexually Abused: No   Housing Stability: Low Risk  (11/13/2023)    Housing Stability Vital Sign     Unable to Pay for Housing in the Last Year: No     Number of [address] in the Last Year: 1     Unstable Housing in the Last Year: No        REVIEW OF SYSTEMS:  GENERAL: No fever or chills  HEART: No chest pain, or palpitation  RESPIRATORY:  No acute SOB or cough  GI: No Nausea, vomit or diarrhea  NEUROLOGIC: No syncope or acute weakness    PHYSICAL EXAM:  VASCULAR EXAM  Pedal pulses are intact. CRT is WNL. NEUROLOGIC EXAM  Sensation is intact to light touch. No focal neurologic deficit. DERMATOLOGIC EXAM:   No ulcer or cellulitis noted. The patient has thick, elongated toenails. MUSCULOSKELETAL EXAM:   No acute joint pain, edema, or redness. No acute musculoskeletal problem.

## 2023-11-30 RX ORDER — TRAMADOL HYDROCHLORIDE 50 MG/1
50 TABLET ORAL EVERY 12 HOURS PRN
Qty: 60 TABLET | Refills: 0 | Status: SHIPPED | OUTPATIENT
Start: 2023-11-30

## 2023-11-30 NOTE — TELEPHONE ENCOUNTER
She has been stable from a renal standpoint. From my standpoint we can get labs before her next appointment with me.  Thanks

## 2023-12-01 ENCOUNTER — OFFICE VISIT (OUTPATIENT)
Dept: PSYCHIATRY | Facility: CLINIC | Age: 63
End: 2023-12-01
Payer: MEDICARE

## 2023-12-01 DIAGNOSIS — F41.1 GENERALIZED ANXIETY DISORDER: ICD-10-CM

## 2023-12-01 DIAGNOSIS — F31.2 BIPOLAR I DISORDER, CURRENT OR MOST RECENT EPISODE MANIC, SEVERE WITH MOOD-INCONGRUENT PSYCHOTIC FEATURES (HCC): Primary | ICD-10-CM

## 2023-12-01 DIAGNOSIS — F42.9 OBSESSIVE-COMPULSIVE DISORDER, UNSPECIFIED TYPE: ICD-10-CM

## 2023-12-01 PROCEDURE — 99214 OFFICE O/P EST MOD 30 MIN: CPT | Performed by: PSYCHIATRY & NEUROLOGY

## 2023-12-01 RX ORDER — HALOPERIDOL 5 MG/1
TABLET ORAL
Qty: 60 TABLET | Refills: 2 | Status: SHIPPED | OUTPATIENT
Start: 2023-12-01

## 2023-12-01 NOTE — PATIENT INSTRUCTIONS
Kal Ernandez [id number]   Thanks for presenting to today's Appointment at [address] was increased: 5mg+ 2mg x 1 wk.  Then 5mg tablet x 2 at night   Your other medications were not changed

## 2023-12-01 NOTE — PSYCH
[facility]/Hospital: [facility], [facility]    Psychiatric Progress Note  MRN#: [id number]  Monique Malling 66 y.o. female    This note was not shared with the patient due to reasonable likelihood of causing patient harm       _________________________________________________________________________________________________________________________________  OFFICE APPOINTMENT   Seen today at [address] [facility] location                                                Patient Monique Livingston ,1960   Prescriber/Physician: Sourav Morton DO Physician Location:   [address]     This service was provided in the office. Patient is currently located in the [facility], where I am  licensed. Patient gave consent to proceed with encounter; acknowledge understanding of security and privacy of encounter   [facility]  Patient verbalized understanding evaluation only involves Psychiatric diagnosing, prescribing, result monitoring   Patient was informed this is a billable service and legal   ___________________________________________________________________________________________________________________________________     Reason for Visit:                         Chief Complaint   Patient presents with   • Anxiety   • Manic Behavior       Subjective: Monique Malling increased Haldol 2mg to 4mg -, verbalized about delusions -precursory toward the sister and niece ( 60 yo) , thinks they are talking about her . Otherwise reports recent fall going up the stairs and hitting her head. Later went to [address] [place] [address]. Jenny Haley denies medication s/e as the reason, stated she was attempt to walk up with two cups of water and had unsteady gait.   External  records were reviewed ; 11/10/23 , ruled as hypoxia, has laceration   repair. ROS:  Milvia- talking a lot   Depression- less depressed at the time dog  , now  "more relaxed, denies suicidial thoughts,   Irritaibilty - anger -pissed out , that sister and niece snope in her stuff, and accusory of sister going in her stuff while at the hospital;   Impulsivity- denies  Concentration- distracted- later reported on familal drama about kids not talking to her cause don't give kids 300 dollar on [place] , and not getting  a card from kids. Energy - hyper  Sleeping- waking up at night , difficulties with returning to sleep   Anxiety- defer to GARRET 7   OCD-  5 out 10 severity       Medication: Hemanth Quinones is  Compliant to  Haldol 4 mg, Venlafaxine 150mg, Seroquel 800mg, Lamotrigine 400mg,  and Luvox  300mg  Med s/e- denies      Medical ROS:   Cardiac: Negative chest pain, palpations   Pertinent items are noted in above, all other symptoms are negative     Medications Trials:  History of ECT x 30 yrs ago- did not work , Effexor, Imprimine, Lithium- can't take due to kidney dz( external records reviewed- CKD stage 4) , Risperidone - not sure as to why , Tofinail, Xanax,  Zoloft- did not work  Buspar - did not work ,  Luvox- not sure why first attempt was stopped, Prozac - was stopped cause of pregnancy, Trazodone- not sure, Aripiprazole 10mg- tiredness      Mental Status Evaluation:  General Appearance:  Hemanth Quinones is a 66 y.o.  female casually dressed, dressed appropriately, looks stated age, Wearing glasses    Behavior:  +  mild agitation, appropriate  eye contact,    Speech:  Talkative > pressured, WNL, rhythm, volume, latency, amount.     Mood:  frustration   Affect:  Slight frustration    Thought Process:  circumstantial, disorganized, illogical, logical, and tangential   Thought Content:  persecutory delusions, negative thinking, ruminations   Perceptual Disturbances: Does not appear responding or preoccupied   Delusions  w/o   Risk Potential: Suicidal Ideations w/o  Homicidal Ideations w/o  Potential for Aggression w/o   Sensorium:  Oriented to person, place [facility]), time/date ( 2023)and situation    Memory:  recent and remote memory grossly intact   Consciousness:  alert and awake   Attention: decreased concentration   Insight:  Fair to appropriate   Judgment: Appropriate    Gait/Station: normal   Motor Activity: no abnormal movements     There were no vitals filed for this visit. Medications:   Current Outpatient Medications on File Prior to Visit   Medication Sig Dispense Refill   • acetaminophen (TYLENOL) 650 mg CR tablet Take 1 tablet (650 mg total) by mouth every 8 (eight) hours as needed for mild pain (Patient taking differently: Take 500 mg by mouth every 8 (eight) hours as needed for mild pain) 30 tablet 0   • albuterol (Ventolin HFA) 90 mcg/act inhaler Inhale 2 puffs every 6 (six) hours as needed for wheezing or shortness of breath 8.5 g 3   • AMILoride 5 mg tablet Take 1 tablet (5 mg total) by mouth 2 (two) times a day 180 tablet 3   • amLODIPine (NORVASC) 5 mg tablet Take 1 tablet (5 mg total) by mouth daily 90 tablet 3   • aspirin 81 mg chewable tablet Chew 1 tablet (81 mg total) daily     • budesonide-formoterol (Symbicort) 160-4.5 mcg/act inhaler Inhale 2 puffs 2 (two) times a day Rinse mouth after use. 10.2 g 11   • carvedilol (COREG) 25 mg tablet Take 1 tablet (25 mg total) by mouth 2 (two) times a day with meals 180 tablet 3   • cholecalciferol (VITAMIN D3) 1,000 units tablet Take 5 tablets (5,000 Units total) by mouth daily 90 tablet 5   • clonazePAM (KlonoPIN) 0.5 mg tablet Take 1 tablet (0.5 mg total) by mouth 3 (three) times a day 270 tablet 0   • Cyanocobalamin (VITAMIN B 12 PO) Take by mouth     • fluvoxaMINE (LUVOX) 100 mg tablet Fluvoxamine 100m tablet in the morning ; 2 tablets at night 270 tablet 1   • haloperidol (HALDOL) 2 mg tablet Haloperidol 2m tablet po daily x 1wk.  Then 1 tablet in morning and 1 tablet at night 60 tablet 1   • HYDROcodone-acetaminophen (Norco) 5-325 mg per tablet Take 1 tablet by mouth 2 (two) times a day as needed for pain Max Daily Amount: 2 tablets 20 tablet 0   • lamoTRIgine (LaMICtal) 200 MG tablet Lamotrigine 200m tablet in the morning and 1 tablet at night 180 tablet 0   • omeprazole (PriLOSEC) 40 MG capsule Take 1 capsule (40 mg total) by mouth daily before breakfast 90 capsule 3   • QUEtiapine (SEROquel) 100 mg tablet Quetiapine 100mg : 1 tablet + 400mg po at night 90 tablet 0   • QUEtiapine (SEROquel) 300 mg tablet Quetiapine 300m tablet po  in morning 90 tablet 0   • rosuvastatin (CRESTOR) 20 MG tablet TAKE 1 TABLET BY MOUTH IN  THE EVENING 90 tablet 3   • traMADol (Ultram) 50 mg tablet Take 1 tablet (50 mg total) by mouth every 12 (twelve) hours as needed for moderate pain 60 tablet 0   • venlafaxine (EFFEXOR-XR) 150 mg 24 hr capsule Venlafaxine HCL ER 150m capsule po daily in the morning 30 capsule 1     No current facility-administered medications on file prior to visit. Labs: I have personally reviewed all pertinent laboratory/tests results.    Most Recent Labs:     Admission on 11/10/2023, Discharged on 2023   Component Date Value Ref Range Status   • ABO Grouping 11/10/2023 B   Final   • Rh Factor 11/10/2023 Positive   Final   • Antibody Screen 11/10/2023 Negative   Final   • Specimen Expiration Date 11/10/2023 [id number]   Final   • WBC 11/10/2023 7.90  4.31 - 10.16 Thousand/uL Final   • RBC 11/10/2023 3.86  3.81 - 5.12 Million/uL Final   • Hemoglobin 11/10/2023 12.0  11.5 - 15.4 g/dL Final   • Hematocrit 11/10/2023 38.5  34.8 - 46.1 % Final   • MCV 11/10/2023 100 (H)  82 - 98 fL Final   • MCH 11/10/2023 31.1  26.8 - 34.3 pg Final   • MCHC 11/10/2023 31.2 (L)  31.4 - 37.4 g/dL Final   • RDW 11/10/2023 13.3  11.6 - 15.1 % Final   • MPV 11/10/2023 10.3  8.9 - 12.7 fL Final   • Platelets  219  149 - 390 Thousands/uL Final   • nRBC 11/10/2023 0  /100 WBCs Final   • Neutrophils Relative 11/10/2023 77 (H)  43 - 75 % Final   • Immat GRANS % 11/10/2023 1  0 - 2 % Final   • Lymphocytes Relative 11/10/2023 15  14 - 44 % Final   • Monocytes Relative 11/10/2023 7  4 - 12 % Final   • Eosinophils Relative 11/10/2023 0  0 - 6 % Final   • Basophils Relative 11/10/2023 0  0 - 1 % Final   • Neutrophils Absolute 11/10/2023 6.06  1.85 - 7.62 Thousands/µL Final   • Immature Grans Absolute 11/10/2023 0.04  0.00 - 0.20 Thousand/uL Final   • Lymphocytes Absolute 11/10/2023 1.22  0.60 - 4.47 Thousands/µL Final   • Monocytes Absolute 11/10/2023 0.55  0.17 - 1.22 Thousand/µL Final   • Eosinophils Absolute 11/10/2023 0.00  0.00 - 0.61 Thousand/µL Final   • Basophils Absolute 11/10/2023 0.03  0.00 - 0.10 Thousands/µL Final   • Sodium 11/10/2023 140  135 - 147 mmol/L Final   • Potassium 11/10/2023 4.8  3.5 - 5.3 mmol/L Final   • Chloride 11/10/2023 107  96 - 108 mmol/L Final   • CO2 11/10/2023 25  21 - 32 mmol/L Final   • ANION GAP 11/10/2023 8  mmol/L Final   • BUN 11/10/2023 22  5 - 25 mg/dL Final   • Creatinine 11/10/2023 2.60 (H)  0.60 - 1.30 mg/dL Final    Standardized to IDMS reference method   • Glucose 11/10/2023 108  65 - 140 mg/dL Final    If the patient is fasting, the ADA then defines impaired fasting glucose as > 100 mg/dL and diabetes as > or equal to 123 mg/dL. • Calcium 11/10/2023 9.9  8.4 - 10.2 mg/dL Final   • AST 11/10/2023 24  13 - 39 U/L Final   • ALT 11/10/2023 21  7 - 52 U/L Final    Specimen collection should occur prior to Sulfasalazine administration due to the potential for falsely depressed results.     • Alkaline Phosphatase 11/10/2023 191 (H)  34 - 104 U/L Final   • Total Protein 11/10/2023 7.4  6.4 - 8.4 g/dL Final   • Albumin 11/10/2023 4.4  3.5 - 5.0 g/dL Final   • Total Bilirubin 11/10/2023 0.37  0.20 - 1.00 mg/dL Final    Use of this assay is not recommended for patients undergoing treatment with eltrombopag due to the potential for falsely elevated results. N-acetyl-p-benzoquinone imine (metabolite of Acetaminophen) will generate erroneously low results in samples for patients that have taken an overdose of Acetaminophen. • eGFR 11/10/2023 19  ml/min/1.73sq m Final   • Ventricular Rate 11/10/2023 77  BPM Final   • Atrial Rate 11/10/2023 77  BPM Final   • ID Interval 11/10/2023 142  ms Final   • QRSD Interval 11/10/2023 136  ms Final   • QT Interval 11/10/2023 426  ms Final   • QTC Interval 11/10/2023 482  ms Final   • P Axis 11/10/2023 62  degrees Final   • QRS Axis 11/10/2023 112  degrees Final   • T Wave Axis 11/10/2023 41  degrees Final   • Sodium 11/11/2023 139  135 - 147 mmol/L Final   • Potassium 11/11/2023 4.5  3.5 - 5.3 mmol/L Final   • Chloride 11/11/2023 109 (H)  96 - 108 mmol/L Final   • CO2 11/11/2023 22  21 - 32 mmol/L Final   • ANION GAP 11/11/2023 8  mmol/L Final   • BUN 11/11/2023 19  5 - 25 mg/dL Final   • Creatinine 11/11/2023 2.64 (H)  0.60 - 1.30 mg/dL Final    Standardized to IDMS reference method   • Glucose 11/11/2023 96  65 - 140 mg/dL Final    If the patient is fasting, the ADA then defines impaired fasting glucose as > 100 mg/dL and diabetes as > or equal to 123 mg/dL.    • Calcium 11/11/2023 9.3  8.4 - 10.2 mg/dL Final   • eGFR 11/11/2023 18  ml/min/1.73sq m Final   • WBC 11/11/2023 5.72  4.31 - 10.16 Thousand/uL Final   • RBC 11/11/2023 3.66 (L)  3.81 - 5.12 Million/uL Final   • Hemoglobin 11/11/2023 11.4 (L)  11.5 - 15.4 g/dL Final   • Hematocrit 11/11/2023 36.8  34.8 - 46.1 % Final   • MCV 11/11/2023 101 (H)  82 - 98 fL Final   • MCH 11/11/2023 31.1  26.8 - 34.3 pg Final   • MCHC 11/11/2023 31.0 (L)  31.4 - 37.4 g/dL Final   • RDW 11/11/2023 13.3  11.6 - 15.1 % Final   • MPV 11/11/2023 10.1  8.9 - 12.7 fL Final   • Platelets 96/33/6909 183  149 - 390 Thousands/uL Final   • nRBC 11/11/2023 0  /100 WBCs Final   • Neutrophils Relative 11/11/2023 57  43 - 75 % Final   • Immat GRANS % 11/11/2023 0  0 - 2 % Final   • Lymphocytes Relative 11/11/2023 29  14 - 44 % Final   • Monocytes Relative 11/11/2023 13 (H)  4 - 12 % Final   • Eosinophils Relative 11/11/2023 0  0 - 6 % Final   • Basophils Relative 11/11/2023 1  0 - 1 % Final   • Neutrophils Absolute 11/11/2023 3.30  1.85 - 7.62 Thousands/µL Final   • Immature Grans Absolute 11/11/2023 0.02  0.00 - 0.20 Thousand/uL Final   • Lymphocytes Absolute 11/11/2023 1.63  0.60 - 4.47 Thousands/µL Final   • Monocytes Absolute 11/11/2023 0.73  0.17 - 1.22 Thousand/µL Final   • Eosinophils Absolute 11/11/2023 0.01  0.00 - 0.61 Thousand/µL Final   • Basophils Absolute 11/11/2023 0.03  0.00 - 0.10 Thousands/µL Final   • Sodium 11/12/2023 138  135 - 147 mmol/L Final   • Potassium 11/12/2023 4.7  3.5 - 5.3 mmol/L Final   • Chloride 11/12/2023 110 (H)  96 - 108 mmol/L Final   • CO2 11/12/2023 22  21 - 32 mmol/L Final   • ANION GAP 11/12/2023 6  mmol/L Final   • BUN 11/12/2023 26 (H)  5 - 25 mg/dL Final   • Creatinine 11/12/2023 2.82 (H)  0.60 - 1.30 mg/dL Final    Standardized to IDMS reference method   • Glucose 11/12/2023 92  65 - 140 mg/dL Final    If the patient is fasting, the ADA then defines impaired fasting glucose as > 100 mg/dL and diabetes as > or equal to 123 mg/dL.    • Calcium 11/12/2023 9.1  8.4 - 10.2 mg/dL Final   • eGFR 11/12/2023 17  ml/min/1.73sq m Final   • WBC 11/12/2023 5.34  4.31 - 10.16 Thousand/uL Final   • RBC 11/12/2023 3.37 (L)  3.81 - 5.12 Million/uL Final   • Hemoglobin 11/12/2023 10.4 (L)  11.5 - 15.4 g/dL Final   • Hematocrit 11/12/2023 34.2 (L)  34.8 - 46.1 % Final   • MCV 11/12/2023 102 (H)  82 - 98 fL Final   • MCH 11/12/2023 30.9  26.8 - 34.3 pg Final   • MCHC 11/12/2023 30.4 (L)  31.4 - 37.4 g/dL Final   • RDW 11/12/2023 13.3  11.6 - 15.1 % Final   • MPV 11/12/2023 10.4  8.9 - 12.7 fL Final   • Platelets 37/13/7780 192  149 - 390 Thousands/uL Final   • nRBC 11/12/2023 0  /100 WBCs Final   • Neutrophils Relative 11/12/2023 60  43 - 75 % Final   • Immat GRANS % 11/12/2023 0  0 - 2 % Final   • Lymphocytes Relative 11/12/2023 29  14 - 44 % Final   • Monocytes Relative 11/12/2023 10  4 - 12 % Final   • Eosinophils Relative 11/12/2023 0  0 - 6 % Final   • Basophils Relative 11/12/2023 1  0 - 1 % Final   • Neutrophils Absolute 11/12/2023 3.21  1.85 - 7.62 Thousands/µL Final   • Immature Grans Absolute 11/12/2023 0.02  0.00 - 0.20 Thousand/uL Final   • Lymphocytes Absolute 11/12/2023 1.53  0.60 - 4.47 Thousands/µL Final   • Monocytes Absolute 11/12/2023 0.55  0.17 - 1.22 Thousand/µL Final   • Eosinophils Absolute 11/12/2023 0.00  0.00 - 0.61 Thousand/µL Final   • Basophils Absolute 11/12/2023 0.03  0.00 - 0.10 Thousands/µL Final   • Sodium 11/13/2023 140  135 - 147 mmol/L Final   • Potassium 11/13/2023 4.5  3.5 - 5.3 mmol/L Final   • Chloride 11/13/2023 111 (H)  96 - 108 mmol/L Final   • CO2 11/13/2023 23  21 - 32 mmol/L Final   • ANION GAP 11/13/2023 6  mmol/L Final   • BUN 11/13/2023 27 (H)  5 - 25 mg/dL Final   • Creatinine 11/13/2023 2.76 (H)  0.60 - 1.30 mg/dL Final    Standardized to IDMS reference method   • Glucose 11/13/2023 98  65 - 140 mg/dL Final    If the patient is fasting, the ADA then defines impaired fasting glucose as > 100 mg/dL and diabetes as > or equal to 123 mg/dL.    • Calcium 11/13/2023 9.3  8.4 - 10.2 mg/dL Final   • eGFR 11/13/2023 17  ml/min/1.73sq m Final   • WBC 11/13/2023 5.85  4.31 - 10.16 Thousand/uL Final   • RBC 11/13/2023 3.42 (L)  3.81 - 5.12 Million/uL Final   • Hemoglobin 11/13/2023 10.6 (L)  11.5 - 15.4 g/dL Final   • Hematocrit 11/13/2023 34.9  34.8 - 46.1 % Final   • MCV 11/13/2023 102 (H)  82 - 98 fL Final   • MCH 11/13/2023 31.0  26.8 - 34.3 pg Final   • MCHC 11/13/2023 30.4 (L)  31.4 - 37.4 g/dL Final   • RDW 11/13/2023 13.5  11.6 - 15.1 % Final   • Platelets 42/84/0716 193  149 - 390 Thousands/uL Final   • MPV 11/13/2023 10.4  8.9 - 12.7 fL Final   • Sodium 11/14/2023 141  135 - 147 mmol/L Final   • Potassium 11/14/2023 4.6  3.5 - 5.3 mmol/L Final   • Chloride 11/14/2023 110 (H)  96 - 108 mmol/L Final   • CO2 11/14/2023 25  21 - 32 mmol/L Final   • ANION GAP 11/14/2023 6  mmol/L Final   • BUN 11/14/2023 27 (H)  5 - 25 mg/dL Final   • Creatinine 11/14/2023 2.53 (H)  0.60 - 1.30 mg/dL Final    Standardized to IDMS reference method   • Glucose 11/14/2023 95  65 - 140 mg/dL Final    If the patient is fasting, the ADA then defines impaired fasting glucose as > 100 mg/dL and diabetes as > or equal to 123 mg/dL.    • Calcium 11/14/2023 9.4  8.4 - 10.2 mg/dL Final   • eGFR 11/14/2023 19  ml/min/1.73sq m Final   • WBC 11/14/2023 4.04 (L)  4.31 - 10.16 Thousand/uL Final   • RBC 11/14/2023 3.32 (L)  3.81 - 5.12 Million/uL Final   • Hemoglobin 11/14/2023 10.2 (L)  11.5 - 15.4 g/dL Final   • Hematocrit 11/14/2023 33.5 (L)  34.8 - 46.1 % Final   • MCV 11/14/2023 101 (H)  82 - 98 fL Final   • MCH 11/14/2023 30.7  26.8 - 34.3 pg Final   • MCHC 11/14/2023 30.4 (L)  31.4 - 37.4 g/dL Final   • RDW 11/14/2023 13.4  11.6 - 15.1 % Final   • MPV 11/14/2023 10.2  8.9 - 12.7 fL Final   • Platelets 03/35/6554 183  149 - 390 Thousands/uL Final   • nRBC 11/14/2023 0  /100 WBCs Final   • Neutrophils Relative 11/14/2023 53  43 - 75 % Final   • Immat GRANS % 11/14/2023 0  0 - 2 % Final   • Lymphocytes Relative 11/14/2023 34  14 - 44 % Final   • Monocytes Relative 11/14/2023 12  4 - 12 % Final   • Eosinophils Relative 11/14/2023 0  0 - 6 % Final   • Basophils Relative 11/14/2023 1  0 - 1 % Final   • Neutrophils Absolute 11/14/2023 2.15  1.85 - 7.62 Thousands/µL Final   • Immature Grans Absolute 11/14/2023 0.01  0.00 - 0.20 Thousand/uL Final   • Lymphocytes Absolute 11/14/2023 1.37  0.60 - 4.47 Thousands/µL Final   • Monocytes Absolute 11/14/2023 0.49  0.17 - 1.22 Thousand/µL Final   • Eosinophils Absolute 11/14/2023 0.00  0.00 - 0.61 Thousand/µL Final   • Basophils Absolute 11/14/2023 0.02  0.00 - 0.10 Thousands/µL Final     Lipids abnormal high 08/2022 09/08/23 ECHO ( EF 65%). GARRET-7 Flowsheet Screening      Flowsheet Row Most Recent Value   Over the last 2 weeks, how often have you been bothered by any of the following problems? Feeling nervous, anxious, or on edge 2   Not being able to stop or control worrying 2   Worrying too much about different things 1   Trouble relaxing 2   Being so restless that it is hard to sit still 1   Becoming easily annoyed or irritable 2   Feeling afraid as if something awful might happen 0   GARRET-7 Total Score 10           ________________________________________________________________________    A/P  Remus Balding y.o.  female, history of  Polysubstance abuse ( cocaine, marijuana), multiple hospitalizations, several suicide attempts, anorexia, OCD, bipolar disorder, presented w/ generalized anxiety , several neurovegetative symptoms. Interim events of percustory delusions, D/C Aripiprazole - s/e "spacy". There's history of head trauma and cognitive complaints, findings later of OCD . Was started on Venlafaxine. Recent findings of hanh , likely antidepressant precipitated. Limited changed since starting low dose Haldol ( with persistent delusions, pressured dialogue, and anxiety) although devoid of s/e. Present with limited distress regarding delusions, devoid of harmful thought, hence patient was not hospitalized        DSM5  1. Bipolar I disorder, current or most recent episode manic, severe with mood-incongruent psychotic features ([address])    2. Generalized anxiety disorder    3. Obsessive-compulsive disorder, unspecified type        PLAN:    History and external records reviewed.   Discussed clinical findings and  diagnostic impression regarding psychosis and lingering hanh   LABS 11/13/23 CMP WNL except high BUN/CR - h/o kidney dz             11/14/23 CBC diff WNL , milding low WBC, RBC, H/H  EKG 11/10/23, NSR, RBBB ,   Haldol Increased to 7mg x 1 wk then 10mg   Did not change other medications      Medications Prescribed During Encounter at [facility]:  -     haloperidol (HALDOL) 5 mg tablet; Haloperidol 5mg : 1 tablet + 2 mg tablet po at night x 1 wk. Then increase to  2 tablets of 5mg at night    Medication List Otherwise:  -     venlafaxine (EFFEXOR-XR) 150 mg 24 hr capsule; Venlafaxine HCL ER 150m capsule po daily in the morning  -     fluvoxaMINE (LUVOX) 100 mg tablet; Fluvoxamine 100m tablet in the morning ; 2 tablets at night  -     QUEtiapine (SEROquel) 400 MG tablet; Quetiapine 400m tablet + 100mg tablet po at night  -     QUEtiapine (SEROquel) 300 mg tablet; Quetiapine 300m tablet po  in morning  -     QUEtiapine (SEROquel) 100 mg tablet; Quetiapine 100mg : 1 tablet + 400mg po at night  -     lamoTRIgine (LaMICtal) 200 MG tablet; Lamotrigine 200m tablet in the morning and 1 tablet at night         [address] [place] [address]: [employer] ;   Lamotrigine , Quetiapine-                                                              [employer] Haldol , Fluvoxamine , Venlafaxine -      Treatement: Risks, benefits, and possible side effects of medications explained to patient and patient verbalizes understanding. Next Appointment at [facility]: 6wks    Today's Appointment@ [facility]  Face To Face:  10:41 -11:11  ;Documentation Time:   3:03PM- 3:27PM     Encounter Duration: Time Spent   mins with Patient. Greater than 50% of total time was spent with the patient           MDM  Number of Diagnoses or Management Options  Diagnosis management comments: 3       Amount and/or Complexity of Data Reviewed  Clinical lab tests: reviewed  Tests in the radiology section of CPT®: reviewed  Review and summarize past medical records: yes    Risk of Complications, Morbidity, and/or Mortality  Presenting problems: moderate  Diagnostic procedures: moderate  Management options: moderate        This note may have been written with the assistance of dictation software.  Please excuse any grammatical  errors, misspellings,  and abnormal spacing of letters , sentences or paragraphs

## 2023-12-06 DIAGNOSIS — I10 ESSENTIAL HYPERTENSION: ICD-10-CM

## 2023-12-06 DIAGNOSIS — N18.30 CHRONIC KIDNEY DISEASE, STAGE 3 (HCC): ICD-10-CM

## 2023-12-06 DIAGNOSIS — E23.2 DIABETES INSIPIDUS (HCC): ICD-10-CM

## 2023-12-06 DIAGNOSIS — I10 PRIMARY HYPERTENSION: ICD-10-CM

## 2023-12-06 DIAGNOSIS — N28.1 RENAL CYST: ICD-10-CM

## 2023-12-06 DIAGNOSIS — N25.81 SECONDARY RENAL HYPERPARATHYROIDISM (HCC): ICD-10-CM

## 2023-12-06 RX ORDER — CARVEDILOL 25 MG/1
25 TABLET ORAL 2 TIMES DAILY WITH MEALS
Qty: 180 TABLET | Refills: 3 | Status: SHIPPED | OUTPATIENT
Start: 2023-12-06

## 2023-12-06 RX ORDER — AMLODIPINE BESYLATE 5 MG/1
5 TABLET ORAL DAILY
Qty: 90 TABLET | Refills: 3 | Status: SHIPPED | OUTPATIENT
Start: 2023-12-06

## 2023-12-13 ENCOUNTER — TELEPHONE (OUTPATIENT)
Dept: FAMILY MEDICINE CLINIC | Facility: HOSPITAL | Age: 63
End: 2023-12-13

## 2023-12-15 ENCOUNTER — OFFICE VISIT (OUTPATIENT)
Dept: PSYCHIATRY | Facility: CLINIC | Age: 63
End: 2023-12-15
Payer: MEDICARE

## 2023-12-15 DIAGNOSIS — F42.9 OBSESSIVE-COMPULSIVE DISORDER, UNSPECIFIED TYPE: ICD-10-CM

## 2023-12-15 DIAGNOSIS — F41.1 GENERALIZED ANXIETY DISORDER: ICD-10-CM

## 2023-12-15 DIAGNOSIS — F31.2 BIPOLAR I DISORDER, CURRENT OR MOST RECENT EPISODE MANIC, SEVERE WITH MOOD-INCONGRUENT PSYCHOTIC FEATURES (HCC): Primary | ICD-10-CM

## 2023-12-15 PROCEDURE — 99214 OFFICE O/P EST MOD 30 MIN: CPT | Performed by: PSYCHIATRY & NEUROLOGY

## 2023-12-15 RX ORDER — VENLAFAXINE HYDROCHLORIDE 75 MG/1
TABLET, EXTENDED RELEASE ORAL
Qty: 1 TABLET | Refills: 1 | Status: SHIPPED | OUTPATIENT
Start: 2023-12-15 | End: 2023-12-15 | Stop reason: SDUPTHER

## 2023-12-15 RX ORDER — HALOPERIDOL 5 MG/1
TABLET ORAL
Qty: 60 TABLET | Refills: 1 | Status: SHIPPED | OUTPATIENT
Start: 2023-12-15

## 2023-12-15 RX ORDER — VENLAFAXINE HYDROCHLORIDE 75 MG/1
TABLET, EXTENDED RELEASE ORAL
Qty: 1 TABLET | Refills: 1 | Status: SHIPPED | OUTPATIENT
Start: 2023-12-15 | End: 2023-12-18 | Stop reason: SDUPTHER

## 2023-12-15 NOTE — PSYCH
[facility]: [address] [place] [address], [address] [facility]    Psychiatric Progress Note  MRN#: [id number]  Meliza Garcia 66 y.o. female    This note was not shared with the patient due to reasonable likelihood of causing patient harm       _________________________________________________________________________________________________________________________________  OFFICE APPOINTMENT   Seen today at [address] [facility] location                                                Patient Meliza Garcia ,1960   Prescriber/Physician: Frances Hernandez DO Physician Location:   [address] [facility] [address] [place] [address]     This service was provided in the office. Patient is currently located in the [place], where I am  licensed. Patient gave consent to proceed with encounter; acknowledge understanding of security and privacy of encounter   [facility]  Patient verbalized understanding evaluation only involves Psychiatric diagnosing, prescribing, result monitoring   Patient was informed this is a billable service and legal   ___________________________________________________________________________________________________________________________________     Reason for Visit:                         No chief complaint on file. Subjective: Marizol No Weischedel increased Haldol  to 2mg in the morning + 2 mg at night + 10mg at night = 14mg; now complained of tiredness , less agitation , less anxious, and less driving around the street multiple times and less thoughts she hit someone. Otherwise paranoid towards sister  ( 76 yo) and nieces ( 62 yo) talking about Selinda Holstein; unsure what there saying, described as walking into the room then others both shutting up.   Was difficult to redirect , was tangential , jumping topics,  with pressured dialogue at times flirty smiles and slight expression of anger while talking about the sister and niece. Later reported coping via closing her door and then the paranoia stops. . Described mood as irritable. Belen Encarnacion accused the sister and niece also of not included her in events and recent behavioral tactic towards the niece. Also impulsivity -  Belen Encarnacion reported she likes to shop and sister don't like that . .. Tells her she has to save money. Junious Fragmin was talkative, also smiling a lot. ROS:  Milvia:  defer to above  Psychoses- defer to above   Depression: slight depression about living situations, then Junious Fragmin verbal want to take to [place] for date and can't invite them in cause of dogs   Hopelessness: denies  Energy : " very high. .. yeah yeah  Sleep: waking up a lot; Belen Encarnacion unsure as to why, h/o bad sleeping habits ; improved sleep when not hearing siser and niece talking about her   Anxiety- defer to above  OCD-  less compulsions      Medication: Belen Encarnacion is  Compliant to  Haldol 4 mg, Venlafaxine 150mg, Seroquel 800mg, Lamotrigine 400mg,  and Luvox  300mg  Med s/e- denies      Medical ROS:   Pertinent items are noted in HPI, all other symptoms are negative       Medications Trials:  History of ECT x 30 yrs ago- did not work , Effexor, Imprimine, Lithium- can't take due to kidney dz( external records reviewed- CKD stage 4) , Risperidone - not sure as to why , Tofinail, Xanax,  Zoloft- did not work  Buspar - did not work ,  Mindy Re- not sure why first attempt was stopped, Prozac - was stopped cause of pregnancy, Trazodone- not sure, Aripiprazole 10mg- tiredness      Mental Status Evaluation:  General Appearance:  Belen Encarnacion is a 66 y.o.  female casually dressed, dressed appropriately, looks stated age, Wearing glasses .     Behavior:  Calm to slight energetic,  slightly flirtations appropriate  eye contact,    Speech:  Talkative > Pressured WNL, rhythm, volume, latency, amount. Mood:  Less agitated    Affect:  Happy  ;elated mood , smiling   Thought Process:  circumstantial, disorganized, logical to illogical tangential, and slightly concrete   Thought Content:  persecutory delusions, negative thinking, ruminations   Perceptual Disturbances: Does not appear responding or preoccupied   Delusions  YES   Risk Potential: Suicidal Ideations w/o  Homicidal Ideations w/o  Potential for Aggression w/o   Sensorium:  Oriented to person, place [facility]), time/date ( November 2023)and situation    Memory:  recent and remote memory grossly intact   Consciousness:  alert and awake   Attention: Impaired concentration   Insight:  Fair to appropriate   Judgment: Appropriate    Gait/Station: normal   Motor Activity: no abnormal movements     There were no vitals filed for this visit. Medications:   Current Outpatient Medications on File Prior to Visit   Medication Sig Dispense Refill    acetaminophen (TYLENOL) 650 mg CR tablet Take 1 tablet (650 mg total) by mouth every 8 (eight) hours as needed for mild pain (Patient taking differently: Take 500 mg by mouth every 8 (eight) hours as needed for mild pain) 30 tablet 0    albuterol (Ventolin HFA) 90 mcg/act inhaler Inhale 2 puffs every 6 (six) hours as needed for wheezing or shortness of breath 8.5 g 3    AMILoride 5 mg tablet Take 1 tablet (5 mg total) by mouth 2 (two) times a day 180 tablet 3    amLODIPine (NORVASC) 5 mg tablet Take 1 tablet (5 mg total) by mouth daily 90 tablet 3    aspirin 81 mg chewable tablet Chew 1 tablet (81 mg total) daily      budesonide-formoterol (Symbicort) 160-4.5 mcg/act inhaler Inhale 2 puffs 2 (two) times a day Rinse mouth after use.  10.2 g 11    carvedilol (COREG) 25 mg tablet Take 1 tablet (25 mg total) by mouth 2 (two) times a day with meals 180 tablet 3    cholecalciferol (VITAMIN D3) 1,000 units tablet Take 5 tablets (5,000 Units total) by mouth daily 90 tablet 5 clonazePAM (KlonoPIN) 0.5 mg tablet Take 1 tablet (0.5 mg total) by mouth 3 (three) times a day 270 tablet 0    Cyanocobalamin (VITAMIN B 12 PO) Take by mouth      fluvoxaMINE (LUVOX) 100 mg tablet Fluvoxamine 100m tablet in the morning ; 2 tablets at night 270 tablet 1    haloperidol (HALDOL) 2 mg tablet Haloperidol 2m tablet po daily x 1wk. Then 1 tablet in morning and 1 tablet at night 60 tablet 1    haloperidol (HALDOL) 5 mg tablet Haloperidol 5mg : 1 tablet + 2 mg tablet po at night x 1 wk. Then increase to  2 tablets of 5mg at night 60 tablet 2    HYDROcodone-acetaminophen (Norco) 5-325 mg per tablet Take 1 tablet by mouth 2 (two) times a day as needed for pain Max Daily Amount: 2 tablets 20 tablet 0    lamoTRIgine (LaMICtal) 200 MG tablet Lamotrigine 200m tablet in the morning and 1 tablet at night 180 tablet 0    omeprazole (PriLOSEC) 40 MG capsule Take 1 capsule (40 mg total) by mouth daily before breakfast 90 capsule 3    QUEtiapine (SEROquel) 100 mg tablet Quetiapine 100mg : 1 tablet + 400mg po at night 90 tablet 0    QUEtiapine (SEROquel) 300 mg tablet Quetiapine 300m tablet po  in morning 90 tablet 0    rosuvastatin (CRESTOR) 20 MG tablet TAKE 1 TABLET BY MOUTH IN  THE EVENING 90 tablet 3    traMADol (Ultram) 50 mg tablet Take 1 tablet (50 mg total) by mouth every 12 (twelve) hours as needed for moderate pain 60 tablet 0    venlafaxine (EFFEXOR-XR) 150 mg 24 hr capsule Venlafaxine HCL ER 150m capsule po daily in the morning 30 capsule 1     No current facility-administered medications on file prior to visit. Labs: I have personally reviewed all pertinent laboratory/tests results. Most Recent Labs:     No visits with results within 1 Month(s) from this visit.    Latest known visit with results is:   Admission on 11/10/2023, Discharged on 2023   Component Date Value Ref Range Status    ABO Grouping 11/10/2023 B   Final    Rh Factor 11/10/2023 Positive   Final    Antibody Screen 11/10/2023 Negative   Final    Specimen Expiration Date 11/10/2023 [id number]   Final    WBC 11/10/2023 7.90  4.31 - 10.16 Thousand/uL Final    RBC 11/10/2023 3.86  3.81 - 5.12 Million/uL Final    Hemoglobin 11/10/2023 12.0  11.5 - 15.4 g/dL Final    Hematocrit 11/10/2023 38.5  34.8 - 46.1 % Final    MCV 11/10/2023 100 (H)  82 - 98 fL Final    MCH 11/10/2023 31.1  26.8 - 34.3 pg Final    MCHC 11/10/2023 31.2 (L)  31.4 - 37.4 g/dL Final    RDW 11/10/2023 13.3  11.6 - 15.1 % Final    MPV 11/10/2023 10.3  8.9 - 12.7 fL Final    Platelets 71/03/1478 219  149 - 390 Thousands/uL Final    nRBC 11/10/2023 0  /100 WBCs Final    Neutrophils Relative 11/10/2023 77 (H)  43 - 75 % Final    Immat GRANS % 11/10/2023 1  0 - 2 % Final    Lymphocytes Relative 11/10/2023 15  14 - 44 % Final    Monocytes Relative 11/10/2023 7  4 - 12 % Final    Eosinophils Relative 11/10/2023 0  0 - 6 % Final    Basophils Relative 11/10/2023 0  0 - 1 % Final    Neutrophils Absolute 11/10/2023 6.06  1.85 - 7.62 Thousands/µL Final    Immature Grans Absolute 11/10/2023 0.04  0.00 - 0.20 Thousand/uL Final    Lymphocytes Absolute 11/10/2023 1.22  0.60 - 4.47 Thousands/µL Final    Monocytes Absolute 11/10/2023 0.55  0.17 - 1.22 Thousand/µL Final    Eosinophils Absolute 11/10/2023 0.00  0.00 - 0.61 Thousand/µL Final    Basophils Absolute 11/10/2023 0.03  0.00 - 0.10 Thousands/µL Final    Sodium 11/10/2023 140  135 - 147 mmol/L Final    Potassium 11/10/2023 4.8  3.5 - 5.3 mmol/L Final    Chloride 11/10/2023 107  96 - 108 mmol/L Final    CO2 11/10/2023 25  21 - 32 mmol/L Final    ANION GAP 11/10/2023 8  mmol/L Final    BUN 11/10/2023 22  5 - 25 mg/dL Final    Creatinine 11/10/2023 2.60 (H)  0.60 - 1.30 mg/dL Final    Standardized to IDMS reference method    Glucose 11/10/2023 108  65 - 140 mg/dL Final    If the patient is fasting, the ADA then defines impaired fasting glucose as > 100 mg/dL and diabetes as > or equal to 123 mg/dL.     Calcium 11/10/2023 9.9 8.4 - 10.2 mg/dL Final    AST 11/10/2023 24  13 - 39 U/L Final    ALT 11/10/2023 21  7 - 52 U/L Final    Specimen collection should occur prior to Sulfasalazine administration due to the potential for falsely depressed results. Alkaline Phosphatase 11/10/2023 191 (H)  34 - 104 U/L Final    Total Protein 11/10/2023 7.4  6.4 - 8.4 g/dL Final    Albumin 11/10/2023 4.4  3.5 - 5.0 g/dL Final    Total Bilirubin 11/10/2023 0.37  0.20 - 1.00 mg/dL Final    Use of this assay is not recommended for patients undergoing treatment with eltrombopag due to the potential for falsely elevated results. N-acetyl-p-benzoquinone imine (metabolite of Acetaminophen) will generate erroneously low results in samples for patients that have taken an overdose of Acetaminophen. eGFR 11/10/2023 19  ml/min/1.73sq m Final    Ventricular Rate 11/10/2023 77  BPM Final    Atrial Rate 11/10/2023 77  BPM Final    WV Interval 11/10/2023 142  ms Final    QRSD Interval 11/10/2023 136  ms Final    QT Interval 11/10/2023 426  ms Final    QTC Interval 11/10/2023 482  ms Final    P Axis 11/10/2023 62  degrees Final    QRS [place] 11/10/2023 112  degrees Final    T Wave Axis 11/10/2023 41  degrees Final    Sodium 11/11/2023 139  135 - 147 mmol/L Final    Potassium 11/11/2023 4.5  3.5 - 5.3 mmol/L Final    Chloride 11/11/2023 109 (H)  96 - 108 mmol/L Final    CO2 11/11/2023 22  21 - 32 mmol/L Final    ANION GAP 11/11/2023 8  mmol/L Final    BUN 11/11/2023 19  5 - 25 mg/dL Final    Creatinine 11/11/2023 2.64 (H)  0.60 - 1.30 mg/dL Final    Standardized to IDMS reference method    Glucose 11/11/2023 96  65 - 140 mg/dL Final    If the patient is fasting, the ADA then defines impaired fasting glucose as > 100 mg/dL and diabetes as > or equal to 123 mg/dL.     Calcium 11/11/2023 9.3  8.4 - 10.2 mg/dL Final    eGFR 11/11/2023 18  ml/min/1.73sq m Final    WBC 11/11/2023 5.72  4.31 - 10.16 Thousand/uL Final    RBC 11/11/2023 3.66 (L)  3.81 - 5.12 Million/uL Final Hemoglobin 11/11/2023 11.4 (L)  11.5 - 15.4 g/dL Final    Hematocrit 11/11/2023 36.8  34.8 - 46.1 % Final    MCV 11/11/2023 101 (H)  82 - 98 fL Final    MCH 11/11/2023 31.1  26.8 - 34.3 pg Final    MCHC 11/11/2023 31.0 (L)  31.4 - 37.4 g/dL Final    RDW 11/11/2023 13.3  11.6 - 15.1 % Final    MPV 11/11/2023 10.1  8.9 - 12.7 fL Final    Platelets 15/38/5389 183  149 - 390 Thousands/uL Final    nRBC 11/11/2023 0  /100 WBCs Final    Neutrophils Relative 11/11/2023 57  43 - 75 % Final    Immat GRANS % 11/11/2023 0  0 - 2 % Final    Lymphocytes Relative 11/11/2023 29  14 - 44 % Final    Monocytes Relative 11/11/2023 13 (H)  4 - 12 % Final    Eosinophils Relative 11/11/2023 0  0 - 6 % Final    Basophils Relative 11/11/2023 1  0 - 1 % Final    Neutrophils Absolute 11/11/2023 3.30  1.85 - 7.62 Thousands/µL Final    Immature Grans Absolute 11/11/2023 0.02  0.00 - 0.20 Thousand/uL Final    Lymphocytes Absolute 11/11/2023 1.63  0.60 - 4.47 Thousands/µL Final    Monocytes Absolute 11/11/2023 0.73  0.17 - 1.22 Thousand/µL Final    Eosinophils Absolute 11/11/2023 0.01  0.00 - 0.61 Thousand/µL Final    Basophils Absolute 11/11/2023 0.03  0.00 - 0.10 Thousands/µL Final    Sodium 11/12/2023 138  135 - 147 mmol/L Final    Potassium 11/12/2023 4.7  3.5 - 5.3 mmol/L Final    Chloride 11/12/2023 110 (H)  96 - 108 mmol/L Final    CO2 11/12/2023 22  21 - 32 mmol/L Final    ANION GAP 11/12/2023 6  mmol/L Final    BUN 11/12/2023 26 (H)  5 - 25 mg/dL Final    Creatinine 11/12/2023 2.82 (H)  0.60 - 1.30 mg/dL Final    Standardized to IDMS reference method    Glucose 11/12/2023 92  65 - 140 mg/dL Final    If the patient is fasting, the ADA then defines impaired fasting glucose as > 100 mg/dL and diabetes as > or equal to 123 mg/dL.     Calcium 11/12/2023 9.1  8.4 - 10.2 mg/dL Final    eGFR 11/12/2023 17  ml/min/1.73sq m Final    WBC 11/12/2023 5.34  4.31 - 10.16 Thousand/uL Final    RBC 11/12/2023 3.37 (L)  3.81 - 5.12 Million/uL Final Hemoglobin 11/12/2023 10.4 (L)  11.5 - 15.4 g/dL Final    Hematocrit 11/12/2023 34.2 (L)  34.8 - 46.1 % Final    MCV 11/12/2023 102 (H)  82 - 98 fL Final    MCH 11/12/2023 30.9  26.8 - 34.3 pg Final    MCHC 11/12/2023 30.4 (L)  31.4 - 37.4 g/dL Final    RDW 11/12/2023 13.3  11.6 - 15.1 % Final    MPV 11/12/2023 10.4  8.9 - 12.7 fL Final    Platelets 16/20/1013 192  149 - 390 Thousands/uL Final    nRBC 11/12/2023 0  /100 WBCs Final    Neutrophils Relative 11/12/2023 60  43 - 75 % Final    Immat GRANS % 11/12/2023 0  0 - 2 % Final    Lymphocytes Relative 11/12/2023 29  14 - 44 % Final    Monocytes Relative 11/12/2023 10  4 - 12 % Final    Eosinophils Relative 11/12/2023 0  0 - 6 % Final    Basophils Relative 11/12/2023 1  0 - 1 % Final    Neutrophils Absolute 11/12/2023 3.21  1.85 - 7.62 Thousands/µL Final    Immature Grans Absolute 11/12/2023 0.02  0.00 - 0.20 Thousand/uL Final    Lymphocytes Absolute 11/12/2023 1.53  0.60 - 4.47 Thousands/µL Final    Monocytes Absolute 11/12/2023 0.55  0.17 - 1.22 Thousand/µL Final    Eosinophils Absolute 11/12/2023 0.00  0.00 - 0.61 Thousand/µL Final    Basophils Absolute 11/12/2023 0.03  0.00 - 0.10 Thousands/µL Final    Sodium 11/13/2023 140  135 - 147 mmol/L Final    Potassium 11/13/2023 4.5  3.5 - 5.3 mmol/L Final    Chloride 11/13/2023 111 (H)  96 - 108 mmol/L Final    CO2 11/13/2023 23  21 - 32 mmol/L Final    ANION GAP 11/13/2023 6  mmol/L Final    BUN 11/13/2023 27 (H)  5 - 25 mg/dL Final    Creatinine 11/13/2023 2.76 (H)  0.60 - 1.30 mg/dL Final    Standardized to IDMS reference method    Glucose 11/13/2023 98  65 - 140 mg/dL Final    If the patient is fasting, the ADA then defines impaired fasting glucose as > 100 mg/dL and diabetes as > or equal to 123 mg/dL.     Calcium 11/13/2023 9.3  8.4 - 10.2 mg/dL Final    eGFR 11/13/2023 17  ml/min/1.73sq m Final    WBC 11/13/2023 5.85  4.31 - 10.16 Thousand/uL Final    RBC 11/13/2023 3.42 (L)  3.81 - 5.12 Million/uL Final Hemoglobin 11/13/2023 10.6 (L)  11.5 - 15.4 g/dL Final    Hematocrit 11/13/2023 34.9  34.8 - 46.1 % Final    MCV 11/13/2023 102 (H)  82 - 98 fL Final    MCH 11/13/2023 31.0  26.8 - 34.3 pg Final    MCHC 11/13/2023 30.4 (L)  31.4 - 37.4 g/dL Final    RDW 11/13/2023 13.5  11.6 - 15.1 % Final    Platelets 56/72/0913 193  149 - 390 Thousands/uL Final    MPV 11/13/2023 10.4  8.9 - 12.7 fL Final    Sodium 11/14/2023 141  135 - 147 mmol/L Final    Potassium 11/14/2023 4.6  3.5 - 5.3 mmol/L Final    Chloride 11/14/2023 110 (H)  96 - 108 mmol/L Final    CO2 11/14/2023 25  21 - 32 mmol/L Final    ANION GAP 11/14/2023 6  mmol/L Final    BUN 11/14/2023 27 (H)  5 - 25 mg/dL Final    Creatinine 11/14/2023 2.53 (H)  0.60 - 1.30 mg/dL Final    Standardized to IDMS reference method    Glucose 11/14/2023 95  65 - 140 mg/dL Final    If the patient is fasting, the ADA then defines impaired fasting glucose as > 100 mg/dL and diabetes as > or equal to 123 mg/dL.     Calcium 11/14/2023 9.4  8.4 - 10.2 mg/dL Final    eGFR 11/14/2023 19  ml/min/1.73sq m Final    WBC 11/14/2023 4.04 (L)  4.31 - 10.16 Thousand/uL Final    RBC 11/14/2023 3.32 (L)  3.81 - 5.12 Million/uL Final    Hemoglobin 11/14/2023 10.2 (L)  11.5 - 15.4 g/dL Final    Hematocrit 11/14/2023 33.5 (L)  34.8 - 46.1 % Final    MCV 11/14/2023 101 (H)  82 - 98 fL Final    MCH 11/14/2023 30.7  26.8 - 34.3 pg Final    MCHC 11/14/2023 30.4 (L)  31.4 - 37.4 g/dL Final    RDW 11/14/2023 13.4  11.6 - 15.1 % Final    MPV 11/14/2023 10.2  8.9 - 12.7 fL Final    Platelets 76/01/3315 183  149 - 390 Thousands/uL Final    nRBC 11/14/2023 0  /100 WBCs Final    Neutrophils Relative 11/14/2023 53  43 - 75 % Final    Immat GRANS % 11/14/2023 0  0 - 2 % Final    Lymphocytes Relative 11/14/2023 34  14 - 44 % Final    Monocytes Relative 11/14/2023 12  4 - 12 % Final    Eosinophils Relative 11/14/2023 0  0 - 6 % Final    Basophils Relative 11/14/2023 1  0 - 1 % Final    Neutrophils Absolute 11/14/2023 2. 15  1.85 - 7.62 Thousands/µL Final    Immature Grans Absolute 11/14/2023 0.01  0.00 - 0.20 Thousand/uL Final    Lymphocytes Absolute 11/14/2023 1.37  0.60 - 4.47 Thousands/µL Final    Monocytes Absolute 11/14/2023 0.49  0.17 - 1.22 Thousand/µL Final    Eosinophils Absolute 11/14/2023 0.00  0.00 - 0.61 Thousand/µL Final    Basophils Absolute 11/14/2023 0.02  0.00 - 0.10 Thousands/µL Final     Lipids abnormal high   08/2022 09/08/23 ECHO ( EF 65%). LABS 11/13/23 CMP WNL except high BUN/CR - h/o kidney dz             11/14/23 CBC diff WNL , milding low WBC, RBC, H/H  EKG 11/10/23, NSR, RBBB ,   ________________________________________________________________________    A/P  Sameul S[age] y.o.  female, history of  Polysubstance abuse ( cocaine, marijuana), multiple hospitalizations, several suicide attempts, anorexia, OCD, bipolar disorder, presented w/ generalized anxiety , several neurovegetative symptoms. Interim events of percustory delusions, D/C Aripiprazole - s/e "spacy". There's history of head trauma and cognitive complaints, findings later of OCD . Was started on Venlafaxine. Recent findings of hanh , likely antidepressant precipitated. Limited changed since starting  higher dose of Haldol ( slight less OCD compulsion , although happy mood , flirtatious,  pressured and nonlogical perseverance about family members, delusions). Devoid of SI, Hi, Plan, has stable housing and appropriate appearance , non aggression, hence patient was not hospitalized      DSM5  1. Bipolar I disorder, current or most recent episode manic, severe with mood-incongruent psychotic features ([address])    2. Generalized anxiety disorder    3. Obsessive-compulsive disorder, unspecified type       PLAN:    History and external records reviewed.   Discussed clinical findings and  diagnostic impression regarding acute hanh w/ psychosis  Discontinued Venlafaxine   Haldol: Increased to 5mg x 3 daily  Did not change other medications      Medications Prescribed During Encounter at [facility]:  -     haloperidol (HALDOL) 5 mg tablet; Haloperidol 5mg :  1 tablet po x 3 daily ( morning, afternoon , night) . Stop Haldol  2mg tablet of Haloperidol  -     venlafaxine 75 mg 24 hr tablet; Venlafaxine ER 75mg : 1 tablet po daily in the morning x 1wk. Then 1/2 tablet po daily in the morning, then stop    Medication List Also:  -     fluvoxaMINE (LUVOX) 100 mg tablet; Fluvoxamine 100m tablet in the morning ; 2 tablets at night  -     QUEtiapine (SEROquel) 400 MG tablet; Quetiapine 400m tablet + 100mg tablet po at night  -     QUEtiapine (SEROquel) 300 mg tablet; Quetiapine 300m tablet po  in morning  -     QUEtiapine (SEROquel) 100 mg tablet; Quetiapine 100mg : 1 tablet + 400mg po at night  -     lamoTRIgine (LaMICtal) 200 MG tablet; Lamotrigine 200m tablet in the morning and 1 tablet at night      Medications Discontinued During This Encounter   Medication Reason    haloperidol (HALDOL) 2 mg tablet     venlafaxine (EFFEXOR-XR) 150 mg 24 hr capsule           [facility] ;   Lamotrigine , Quetiapine-                                                              [facility] Haldol , Fluvoxamine , Venlafaxine -      Treatement: Risks, benefits, and possible side effects of medications explained to patient and patient verbalizes understanding. Next Appointment at [facility]: 6wks    Today's Appointment@ [facility]  Patient Encounter   10:45 - 11:38     Encounter Duration: Time Spent 43  mins with Patient. Greater than 50% of total time was spent with the patient       MDM  Number of Diagnoses or Management Options  Diagnosis management comments: 3       Amount and/or Complexity of Data Reviewed  Review and summarize past medical records: yes    Risk of Complications, Morbidity, and/or Mortality  Presenting problems: moderate  Diagnostic procedures: moderate  Management options: moderate        This note may have been written with the assistance of dictation software. Please excuse any grammatical  errors, misspellings,  and abnormal spacing of letters , sentences or paragraphs .  For accurate interpretation read note horizontally

## 2023-12-15 NOTE — PATIENT INSTRUCTIONS
Meliza Garcia [id number]   Thanks for presenting to today's Appointment at [facility]  Haldol 5mg : Increased to 15mg daily  Haldol 2mg tablets were stopped  Venlafaxine was stopped  Your other medications were not changed

## 2023-12-18 ENCOUNTER — TELEPHONE (OUTPATIENT)
Dept: PSYCHIATRY | Facility: CLINIC | Age: 63
End: 2023-12-18

## 2023-12-18 DIAGNOSIS — F41.1 GENERALIZED ANXIETY DISORDER: ICD-10-CM

## 2023-12-18 DIAGNOSIS — F42.9 OBSESSIVE-COMPULSIVE DISORDER, UNSPECIFIED TYPE: ICD-10-CM

## 2023-12-18 RX ORDER — VENLAFAXINE HYDROCHLORIDE 75 MG/1
TABLET, EXTENDED RELEASE ORAL
Qty: 7 TABLET | Refills: 1 | Status: SHIPPED | OUTPATIENT
Start: 2023-12-18

## 2023-12-18 NOTE — TELEPHONE ENCOUNTER
Jacob the pharmacist called saying there are several issues with the script that you sent on the 15th for the venlafaxine 75mg    The quantity is only 1, it doesn't say how long she's doing a half tablet for, and he said that really he should have a script for each dose as the pills are not meant to be cut    He's asking if you can send over 2 new scripts with the correct directions and quantity for each

## 2023-12-18 NOTE — TELEPHONE ENCOUNTER
Pt Ayleen Moralez, 1960 [facility] chart was reviewed.  Venlafaxine 75mg was sent to [facility]     This note was not shared with the patient due to reasonable likelihood of causing patient harm

## 2023-12-19 ENCOUNTER — OFFICE VISIT (OUTPATIENT)
Dept: OBGYN CLINIC | Facility: CLINIC | Age: 63
End: 2023-12-19
Payer: MEDICARE

## 2023-12-19 VITALS
DIASTOLIC BLOOD PRESSURE: 90 MMHG | WEIGHT: 214 LBS | HEIGHT: 67 IN | BODY MASS INDEX: 33.59 KG/M2 | SYSTOLIC BLOOD PRESSURE: 145 MMHG

## 2023-12-19 DIAGNOSIS — M25.562 ACUTE PAIN OF LEFT KNEE: ICD-10-CM

## 2023-12-19 DIAGNOSIS — M17.0 PRIMARY LOCALIZED OSTEOARTHRITIS OF KNEES, BILATERAL: Primary | ICD-10-CM

## 2023-12-19 PROCEDURE — 99214 OFFICE O/P EST MOD 30 MIN: CPT | Performed by: ORTHOPAEDIC SURGERY

## 2023-12-19 NOTE — PROGRESS NOTES
Assessment:     1. Primary localized osteoarthritis of knees, bilateral    2. Acute pain of left knee          Plan:     Problem List Items Addressed This Visit          Musculoskeletal and Integument    Primary localized osteoarthritis of knees, bilateral - Primary       Other    Acute pain of left knee    Relevant Orders    MRI knee left  wo contrast       Findings today are consistent with mild-moderate bilateral knee osteoarthritis with concern for left knee lateral meniscus tear.  Findings and treatment options were discussed with the patient.  X-rays were reviewed with her.  Patient continues to have locking of the left knee.  Recommend MRI of the left knee to further evaluate the joint.  Discussed that the aggravation of her knee osteoarthritis from the fall will take more time to heal.  She most likely has some soft tissue injury as well.  Continue icing and NSAIDs as needed.  Follow-up after MRI and I will go over further treatment recommendations at that time.  All patient's questions were answered to her satisfaction.  This note is created using dictation transcription.  It may contain typographical errors, grammatical errors, improperly dictated words, background noise and other errors.    Subjective:     Patient ID: Ayleen Moralez is a 63 y.o. female.  Chief Complaint:  63 year old female patient presents today for a follow up of bilateral knee osteoarthritis, left more symptomatic than right.  Last seen 7/26/2023 for CSI.  She states she only had 1 week of relief with the last cortisone injections.  She did go to physical therapy for 2 months that did not help as well.  She has tried joint supplement injections in 2021 and states they did not help either.  Pain is aching and mostly localized over the anterior aspect of her knees.  Pain is worse when getting up from a seated position, prolonged walking and standing.  She did have a fall on November 10, 2023.  She fell in her home directly on her  bilateral knees.  She states her left knee locked up which caused her to fall.  She has had locking of the left knee since.  She was seen in the emergency room that day and x-rays were taken that were negative for fracture.    Allergy:  Allergies   Allergen Reactions    Molds & Smuts Nasal Congestion    Bee Pollen Nasal Congestion     Other reaction(s): Nasal Congestion    Pollen Extract Nasal Congestion     Medications:  all current active meds have been reviewed  Past Medical History:  Past Medical History:   Diagnosis Date    Anxiety     Anxiety disorder     Arthritis     At risk for falls     Bipolar 2 disorder ([facility])     Chronic back pain     Chronic kidney disease     Closed fracture of distal end of right fibula with routine healing 2020    COVID-19     in 2021    CVA (cerebral vascular accident) ([facility])     noted on MRI in the past    Depression     GERD (gastroesophageal reflux disease)     Hypercholesteremia     Hypernatremia     Hypertension     Hypokalemia     Idiopathic chronic pancreatitis ([facility]) 2018    Intervertebral disc disorder with radiculopathy of lumbosacral region     resolved: 2015    Kidney disease     Kidney disease     Limb alert care status     LUE-fistula    Panic attacks     Pericardial effusion     PONV (postoperative nausea and vomiting)     Psychiatric problem     Radiculitis     resolved: 2015    Secondary renal hyperparathyroidism (HCC)     Stroke ([facility])     Vitamin D deficiency      Past Surgical History:  Past Surgical History:   Procedure Laterality Date    BUNIONECTOMY      Left foot     CAST APPLICATION Right 2022    Procedure: Application short-arm thumb spica splint;  Surgeon: Lyndon Victoria MD;  Location:  MAIN OR;  Service: Orthopedics    COLON SURGERY      COLONOSCOPY  2021    DILATION AND CURETTAGE OF UTERUS      INDUCED       surgically induced    KS ARTERIOVENOUS ANASTOMOSIS OPEN DIRECT Left 2019    Procedure: CREATION  FISTULA ARTERIOVENOUS (AV) left wrists possible left upper;  Surgeon: Andrey Quintero MD;  Location: [facility];  Service: Vascular    NH CORRJ HALLUX VALGUS W/SESMDC W/DIST METAR OSTEOT Left 7/1/2019    Procedure: Serge bunionectomy;  Surgeon: Munir Larkin DPM;  Location: [facility];  Service: Podiatry    NH CORRJ HALLUX VALGUS W/SESMDC W/DIST METAR OSTEOT Right 8/3/2020    Procedure: BUNIONECTOMY RAFAEL;  Surgeon: Munir Larkin DPM;  Location: [facility];  Service: Podiatry    NH CORRJ HALLUX VALGUS W/SESMDC W/PROX PHLNX OSTEOT Right 9/27/2021    Procedure: BUNIONECTOMY TAMEKA, right tameka osteotomy and 2nd claw toe correction;  Surgeon: James R Lachman, MD;  Location: [facility];  Service: Orthopedics    NH ERCP DX COLLECTION SPECIMEN BRUSHING/WASHING N/A 4/11/2018    Procedure: ENDOSCOPIC RETROGRADE CHOLANGIOPANCREATOGRAPHY (ERCP);  Surgeon: Alfredo Messina MD;  Location: [facility];  Service: Gastroenterology    NH LAPAROSCOPY PROCTOPEXY PROLAPSE N/A 7/13/2018    Procedure: ROBOTIC SIGMOID RESECTION / RECTOPEXY;  Surgeon: ELPIDIO Mcnulty MD;  Location: [facility];  Service: Colorectal    NH OPEN TREATMENT RADIAL SHAFT FRACTURE Right 10/11/2021    Procedure: OPEN REDUCTION W/ INTERNAL FIXATION (ORIF) RADIUS (WRIST), RIGHT DISTAL;  Surgeon: Riki Ma MD;  [facility]: [facility];  Service: Orthopedics    NH REMOVAL IMPLANT DEEP Right 6/23/2022    Procedure: Removal of hardware volar aspect right distal radius (distal radial plate and screws);  Surgeon: Lyndon Victoria MD;  Location: [facility];  Service: Orthopedics    NH SIGMOIDOSCOPY FLX DX W/COLLJ SPEC BR/WA IF PFRMD N/A 7/13/2018    Procedure: SIGMOIDOSCOPY FLEXIBLE;  Surgeon: ELPIDIO Mcnulty MD;  [facility]: [facility];  Service: Colorectal    NH TR TDN RESTORE INTRNSC FUNCJ ALL 4 FNGRS Right 6/23/2022    Procedure: Right ring finger flexor digitorum superficialis to flexor pollicis longus tendon transfer;  Surgeon: Lyndon Victoria MD;   Location:  MAIN OR;  Service: Orthopedics    TUBAL LIGATION Bilateral 1997    US GUIDED THYROID BIOPSY  7/30/2019     Family History:  Family History   Problem Relation Age of Onset    Bipolar disorder Mother     Mental illness Mother         depression    Stroke Mother     Dementia Mother     Colon polyps Mother     Heart disease Father     Hypertension Father     Diabetes Father     Other Family         Back disorder    Diabetes Family     Heart disease Family     Hypertension Family     Stroke Family     Thyroid disease Family     Breast cancer Paternal Grandmother         age unknown    Breast cancer Paternal Aunt         age unknown    Breast cancer Maternal Aunt         age unknown    Mental illness Sister     Colon polyps Sister     Mental illness Sister     Heart disease Sister     No Known Problems Sister     Breast cancer Sister 68    Other Son         pituitary tumor    Hypertension Son     Obesity Son     No Known Problems Son     No Known Problems Maternal Grandmother     No Known Problems Maternal Grandfather     No Known Problems Paternal Grandfather     Breast cancer Paternal Aunt         age unknown    Substance Abuse Neg Hx         neg fam hx    Colon cancer Neg Hx      Social History:  Social History     Substance and Sexual Activity   Alcohol Use Never     Social History     Substance and Sexual Activity   Drug Use Not Currently     Social History     Tobacco Use   Smoking Status Never   Smokeless Tobacco Never     Review of Systems   Constitutional: Negative.    HENT: Negative.     Eyes: Negative.    Respiratory: Negative.     Cardiovascular: Negative.    Gastrointestinal: Negative.    Endocrine: Negative.    Genitourinary: Negative.    Musculoskeletal:  Positive for arthralgias (Bilateral knee), gait problem (Antalgic) and joint swelling (Bilateral knee).   Skin: Negative.    Allergic/Immunologic: Negative.    Hematological: Negative.    Psychiatric/Behavioral: Negative.          "  Objective:  BP Readings from Last 1 Encounters:   12/19/23 145/90      Wt Readings from Last 1 Encounters:   12/19/23 97.1 kg (214 lb)      BMI:   Estimated body mass index is 33.52 kg/m² as calculated from the following:    Height as of this encounter: 5' 7\" (1.702 m).    Weight as of this encounter: 97.1 kg (214 lb).  BSA:   Estimated body surface area is 2.08 meters squared as calculated from the following:    Height as of this encounter: 5' 7\" (1.702 m).    Weight as of this encounter: 97.1 kg (214 lb).   Physical Exam  Vitals and nursing note reviewed.   Constitutional:       Appearance: Normal appearance. She is well-developed.   HENT:      Head: Normocephalic and atraumatic.      Right Ear: External ear normal.      Left Ear: External ear normal.   Eyes:      Extraocular Movements: Extraocular movements intact.      Conjunctiva/sclera: Conjunctivae normal.   Pulmonary:      Effort: Pulmonary effort is normal.   Musculoskeletal:      Cervical back: Neck supple.      Right knee: No effusion.      Instability Tests: Medial Kateryna test negative and lateral Kateryna test negative.      Left knee: No effusion.      Instability Tests: Lateral Kateryna test positive. Medial Kateryna test negative.   Skin:     General: Skin is warm and dry.   Neurological:      Mental Status: She is alert and oriented to person, place, and time.      Deep Tendon Reflexes: Reflexes are normal and symmetric.   Psychiatric:         Mood and Affect: Mood normal.         Behavior: Behavior normal.       Right Knee Exam     Tenderness   The patient is experiencing tenderness in the patella.    Range of Motion   Extension:  0   Flexion:  120     Tests   Kateryna:  Medial - negative Lateral - negative  Varus: negative Valgus: negative  Patellar apprehension: negative    Other   Erythema: absent  Scars: absent  Sensation: normal  Pulse: present  Swelling: mild  Effusion: no effusion present      Left Knee Exam     Tenderness   The patient " is experiencing tenderness in the lateral joint line and patella.    Range of Motion   Extension:  0   Flexion:  120     Tests   Kateryna:  Medial - negative Lateral - positive  Varus: negative Valgus: negative  Patellar apprehension: negative    Other   Erythema: absent  Scars: absent  Sensation: normal  Pulse: present  Swelling: mild  Effusion: no effusion present            I have personally reviewed pertinent films in PACS and my interpretation is Mild degenerative changes noted in medal and lateral compartments. Moderate degenerative changes noted patellofemoral compartment.     Scribe Attestation      I,:  Yari Wallace PA-C am acting as a scribe while in the presence of the attending physician.:       I,:  Riki Ma MD personally performed the services described in this documentation    as scribed in my presence.:

## 2023-12-20 ENCOUNTER — TELEPHONE (OUTPATIENT)
Dept: FAMILY MEDICINE CLINIC | Facility: HOSPITAL | Age: 63
End: 2023-12-20

## 2023-12-20 DIAGNOSIS — G44.319 ACUTE POST-TRAUMATIC HEADACHE, NOT INTRACTABLE: ICD-10-CM
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
## 2023-12-20 RX ORDER — HYDROCODONE BITARTRATE AND ACETAMINOPHEN 5; 325 MG/1; MG/1
1 TABLET ORAL 2 TIMES DAILY PRN
Qty: 20 TABLET | Refills: 0 | Status: SHIPPED | OUTPATIENT
Start: 2023-12-20 | End: 2023-12-29 | Stop reason: ALTCHOICE

## 2023-12-20 NOTE — TELEPHONE ENCOUNTER
[facility]--received rx for Oxycodone---pt usually takes Tramadol. Questioning if this is correct. Please advise

## 2023-12-21 NOTE — TELEPHONE ENCOUNTER
I spoke with the patient she said she has no idea why she would be getting tramadol from anyone else. I did let her know we cannot fill her tramadol if she is getting it from someone else. Patient said that was ok. I did speak with the pharmacist they will let us know if she is getting tramadol from someone else. They will cancel the norco.

## 2023-12-22 ENCOUNTER — TELEPHONE (OUTPATIENT)
Dept: FAMILY MEDICINE CLINIC | Facility: HOSPITAL | Age: 63
End: 2023-12-22

## 2023-12-22 NOTE — TELEPHONE ENCOUNTER
Don-[facility]---- I have a script for Tramadol for her that she's trying to get filled from November 30th. Should we be filling that or should we also be cancelling that? I didn't know where we had landed with the patient as far as like if you know what you guys are comfortable doing. So didn't know if we should be filling the Tramadol as well, or if we should also discontinue that like we did with the Hydrocodone. PCB

## 2023-12-26 DIAGNOSIS — F42.9 OBSESSIVE-COMPULSIVE DISORDER, UNSPECIFIED TYPE: ICD-10-CM

## 2023-12-26 DIAGNOSIS — F41.1 GENERALIZED ANXIETY DISORDER: ICD-10-CM

## 2023-12-26 DIAGNOSIS — I10 PRIMARY HYPERTENSION: ICD-10-CM

## 2023-12-26 DIAGNOSIS — E78.00 HYPERCHOLESTEREMIA: ICD-10-CM

## 2023-12-26 DIAGNOSIS — F31.2 BIPOLAR I DISORDER, CURRENT OR MOST RECENT EPISODE MANIC, SEVERE WITH MOOD-INCONGRUENT PSYCHOTIC FEATURES (HCC): ICD-10-CM

## 2023-12-26 DIAGNOSIS — K21.9 GASTROESOPHAGEAL REFLUX DISEASE, UNSPECIFIED WHETHER ESOPHAGITIS PRESENT: ICD-10-CM

## 2023-12-26 DIAGNOSIS — G44.319 ACUTE POST-TRAUMATIC HEADACHE, NOT INTRACTABLE: ICD-10-CM

## 2023-12-26 DIAGNOSIS — M54.50 LUMBAR PAIN: ICD-10-CM

## 2023-12-26 DIAGNOSIS — M79.604 BILATERAL LEG PAIN: ICD-10-CM
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
## 2023-12-26 DIAGNOSIS — M79.605 BILATERAL LEG PAIN: ICD-10-CM

## 2023-12-26 DIAGNOSIS — F31.13 BIPOLAR I DISORDER, CURRENT OR MOST RECENT EPISODE MANIC, SEVERE WITH MIXED FEATURES (HCC): ICD-10-CM

## 2023-12-26 RX ORDER — HYDROCODONE BITARTRATE AND ACETAMINOPHEN 5; 325 MG/1; MG/1
1 TABLET ORAL 2 TIMES DAILY PRN
Qty: 20 TABLET | Refills: 0 | OUTPATIENT
Start: 2023-12-26

## 2023-12-26 RX ORDER — HALOPERIDOL 5 MG/1
TABLET ORAL
Qty: 60 TABLET | Refills: 1 | OUTPATIENT
Start: 2023-12-26

## 2023-12-26 NOTE — TELEPHONE ENCOUNTER
Spoke to pt, reports she was in the hospital, and than went to [facility], and when she was D/C'd they gave her only a month's worth of medications, looking for refills of the following that were sent. Thank you

## 2023-12-27 ENCOUNTER — HOSPITAL ENCOUNTER (OUTPATIENT)
Dept: BONE DENSITY | Facility: IMAGING CENTER | Age: 63
Discharge: HOME | End: 2023-12-27
Payer: MEDICARE

## 2023-12-27 ENCOUNTER — TELEPHONE (OUTPATIENT)
Age: 63
End: 2023-12-27

## 2023-12-27 VITALS — WEIGHT: 217.2 LBS | BODY MASS INDEX: 37.08 KG/M2 | HEIGHT: 64 IN

## 2023-12-27 DIAGNOSIS — Z78.0 POSTMENOPAUSAL: ICD-10-CM

## 2023-12-27 PROCEDURE — 77080 DXA BONE DENSITY AXIAL: CPT

## 2023-12-27 NOTE — TELEPHONE ENCOUNTER
Patient called again:    Hi this is Ayleen. My saddle [birth year]. I had talked to someone yesterday and there was supposed to send a prescription over and this has got sent over yet and I was just curious why it would it would be sent to [facility]. My number is their number is to [phone] and my number is [phone]. Thank you.  You received a voice mail from [employer].

## 2023-12-27 NOTE — TELEPHONE ENCOUNTER
Caller: Patient    Doctor: Francesca    Reason for call: Patient is set to have MRI on 1/12/2024 for her left knee and would like to know if you could include the right knee as well, as she is experiencing great pain .  Please advise    Call back#: [phone]

## 2023-12-28 DIAGNOSIS — M81.0 AGE-RELATED OSTEOPOROSIS WITHOUT CURRENT PATHOLOGICAL FRACTURE: Primary | ICD-10-CM

## 2023-12-28 RX ORDER — AMLODIPINE BESYLATE 5 MG/1
5 TABLET ORAL DAILY
Qty: 90 TABLET | Refills: 3 | Status: SHIPPED | OUTPATIENT
Start: 2023-12-28

## 2023-12-28 RX ORDER — VENLAFAXINE HYDROCHLORIDE 75 MG/1
TABLET, EXTENDED RELEASE ORAL
Qty: 7 TABLET | Refills: 1 | OUTPATIENT
Start: 2023-12-28

## 2023-12-28 RX ORDER — OMEPRAZOLE 40 MG/1
40 CAPSULE, DELAYED RELEASE ORAL
Qty: 90 CAPSULE | Refills: 3 | Status: SHIPPED | OUTPATIENT
Start: 2023-12-28 | End: 2024-01-03 | Stop reason: SDUPTHER

## 2023-12-28 RX ORDER — TRAMADOL HYDROCHLORIDE 50 MG/1
50 TABLET ORAL EVERY 12 HOURS PRN
Qty: 60 TABLET | Refills: 0 | Status: SHIPPED | OUTPATIENT
Start: 2023-12-28

## 2023-12-28 RX ORDER — ROSUVASTATIN CALCIUM 20 MG/1
20 TABLET, COATED ORAL EVERY EVENING
Qty: 90 TABLET | Refills: 3 | Status: SHIPPED | OUTPATIENT
Start: 2023-12-28

## 2023-12-28 RX ORDER — FLUVOXAMINE MALEATE 100 MG
TABLET ORAL
Qty: 270 TABLET | Refills: 1 | Status: SHIPPED | OUTPATIENT
Start: 2023-12-28

## 2023-12-28 RX ORDER — QUETIAPINE FUMARATE 300 MG/1
TABLET, FILM COATED ORAL
Qty: 90 TABLET | Refills: 0 | Status: SHIPPED | OUTPATIENT
Start: 2023-12-28 | End: 2024-01-02 | Stop reason: SDUPTHER

## 2023-12-28 RX ORDER — QUETIAPINE FUMARATE 100 MG/1
TABLET, FILM COATED ORAL
Qty: 90 TABLET | Refills: 0 | Status: SHIPPED | OUTPATIENT
Start: 2023-12-28 | End: 2024-01-04

## 2023-12-28 RX ORDER — ALENDRONATE SODIUM 70 MG/1
70 TABLET ORAL
Qty: 4 TABLET | Refills: 5 | Status: SHIPPED | OUTPATIENT
Start: 2023-12-28

## 2023-12-29 ENCOUNTER — OFFICE VISIT (OUTPATIENT)
Dept: FAMILY MEDICINE CLINIC | Facility: HOSPITAL | Age: 63
End: 2023-12-29
Payer: MEDICARE

## 2023-12-29 VITALS
OXYGEN SATURATION: 92 % | DIASTOLIC BLOOD PRESSURE: 78 MMHG | WEIGHT: 217 LBS | HEIGHT: 64 IN | SYSTOLIC BLOOD PRESSURE: 156 MMHG | BODY MASS INDEX: 37.05 KG/M2 | HEART RATE: 90 BPM

## 2023-12-29 DIAGNOSIS — J84.9 ILD (INTERSTITIAL LUNG DISEASE) (HCC): ICD-10-CM

## 2023-12-29 DIAGNOSIS — M70.51 PES ANSERINUS BURSITIS OF BOTH KNEES: ICD-10-CM

## 2023-12-29 DIAGNOSIS — I35.0 MODERATE AORTIC STENOSIS: ICD-10-CM

## 2023-12-29 DIAGNOSIS — J45.40 MODERATE PERSISTENT ASTHMA WITHOUT COMPLICATION: Primary | ICD-10-CM

## 2023-12-29 DIAGNOSIS — M76.892 LEFT KNEE TENDONITIS: ICD-10-CM

## 2023-12-29 DIAGNOSIS — M70.52 PES ANSERINUS BURSITIS OF BOTH KNEES: ICD-10-CM

## 2023-12-29 DIAGNOSIS — M81.0 AGE-RELATED OSTEOPOROSIS WITHOUT CURRENT PATHOLOGICAL FRACTURE: ICD-10-CM

## 2023-12-29 PROCEDURE — 99215 OFFICE O/P EST HI 40 MIN: CPT | Performed by: INTERNAL MEDICINE

## 2023-12-29 NOTE — PATIENT INSTRUCTIONS
Call to get echo scheduled in feb and then see Dr. Garcia in follow up   Use symbicort 2 puffs 2x day   Use your walker daily- avoid falling

## 2023-12-29 NOTE — PROGRESS NOTES
Assessment/Plan:     Diagnosis ICD-10-CM Associated Orders   1. Moderate persistent asthma without complication  J45.40       2. ILD (interstitial lung disease) ([facility])  J84.9       3. Moderate aortic stenosis  I35.0       4. Age-related osteoporosis without current pathological fracture  M81.0       5. Left knee tendonitis  M76.892       6. Pes anserinus bursitis of both knees  M70.51     M70.52           Problem List Items Addressed This Visit          Respiratory    Moderate persistent asthma without complication - Primary     Has lower oxyugen level today at 92%- had not been usign her symbicort   Will have her restart those 2x day         ILD (interstitial lung disease) ([facility])       Cardiovascular and Mediastinum    Moderate aortic stenosis       Musculoskeletal and Integument    Age-related osteoporosis without current pathological fracture    Left knee tendonitis    Pes anserinus bursitis of both knees         Return in about 3 months (around 3/29/2024) for Annual Medicare Wellness.      Subjective:    Patient ID: Ayleen Moralez is a 63 y.o. female    Had fall down stairs backwards- had stiches in top of head x8-in November- was at  [facility] for several days with low oxygen- then was at [place]  was on narcotic-   Returned home November after week- was using a wlker- did nto  use it tiday.  Now complains of pain in her left knee- now in front of left knee  see Dr. Celaya- has an mri of knee ordered for next week. - had to crawl to br due to pain- discussed using her walker  now - not going up stairs at sisters home in the family room. Sleeping on a couch.        The following portions of the patient's history were reviewed and updated as appropriate: allergies, current medications and problem list.     Review of Systems   Constitutional:  Positive for fatigue.   HENT:  Negative for congestion.    Respiratory:  Positive for shortness of breath. Negative for cough.         Occasional sob if  active   Cardiovascular:  Negative for chest pain.   Gastrointestinal:  Negative for constipation and diarrhea.   All other systems reviewed and are negative.        Objective:      Current Outpatient Medications:     acetaminophen (TYLENOL) 650 mg CR tablet, Take 1 tablet (650 mg total) by mouth every 8 (eight) hours as needed for mild pain (Patient taking differently: Take 500 mg by mouth every 8 (eight) hours as needed for mild pain), Disp: 30 tablet, Rfl: 0    albuterol (Ventolin HFA) 90 mcg/act inhaler, Inhale 2 puffs every 6 (six) hours as needed for wheezing or shortness of breath, Disp: 8.5 g, Rfl: 3    AMILoride 5 mg tablet, Take 1 tablet (5 mg total) by mouth 2 (two) times a day, Disp: 180 tablet, Rfl: 3    amLODIPine (NORVASC) 5 mg tablet, Take 1 tablet (5 mg total) by mouth daily, Disp: 90 tablet, Rfl: 3    aspirin 81 mg chewable tablet, Chew 1 tablet (81 mg total) daily, Disp: , Rfl:     budesonide-formoterol (Symbicort) 160-4.5 mcg/act inhaler, Inhale 2 puffs 2 (two) times a day Rinse mouth after use., Disp: 10.2 g, Rfl: 11    carvedilol (COREG) 25 mg tablet, Take 1 tablet (25 mg total) by mouth 2 (two) times a day with meals, Disp: 180 tablet, Rfl: 3    clonazePAM (KlonoPIN) 0.5 mg tablet, Take 1 tablet (0.5 mg total) by mouth 3 (three) times a day, Disp: 270 tablet, Rfl: 0    fluvoxaMINE (LUVOX) 100 mg tablet, Fluvoxamine 100m tablet in the morning ; 2 tablets at night, Disp: 270 tablet, Rfl: 1    haloperidol (HALDOL) 5 mg tablet, Haloperidol 5mg :  1 tablet po x 3 daily ( morning, afternoon , night) . Stop Haldol  2mg tablet of Haloperidol, Disp: 60 tablet, Rfl: 1    lamoTRIgine (LaMICtal) 200 MG tablet, Lamotrigine 200m tablet in the morning and 1 tablet at night, Disp: 180 tablet, Rfl: 0    omeprazole (PriLOSEC) 40 MG capsule, Take 1 capsule (40 mg total) by mouth daily before breakfast, Disp: 90 capsule, Rfl: 3    QUEtiapine (SEROquel) 100 mg tablet, Quetiapine 100mg : 1 tablet + 400mg po  "at night (Patient taking differently: Quetiapine 100mg : 1 tablet + 400mg po at night  500 AT NIGHT), Disp: 90 tablet, Rfl: 0    QUEtiapine (SEROquel) 300 mg tablet, Quetiapine 300m tablet po  in morning (Patient taking differently: Quetiapine 300m tablet po  in morning  300 IN THE AM), Disp: 90 tablet, Rfl: 0    rosuvastatin (CRESTOR) 20 MG tablet, Take 1 tablet (20 mg total) by mouth every evening, Disp: 90 tablet, Rfl: 3    traMADol (Ultram) 50 mg tablet, Take 1 tablet (50 mg total) by mouth every 12 (twelve) hours as needed for moderate pain, Disp: 60 tablet, Rfl: 0    venlafaxine 75 mg 24 hr tablet, Venlafaxine ER 75mg : 1 tablet po daily in the morning x 1wk. Then 1/2 tablet po daily in the morning, then stop, Disp: 7 tablet, Rfl: 1    alendronate (Fosamax) 70 mg tablet, Take 1 tablet (70 mg total) by mouth every 7 days (Patient not taking: Reported on 2023), Disp: 4 tablet, Rfl: 5    cholecalciferol (VITAMIN D3) 1,000 units tablet, Take 5 tablets (5,000 Units total) by mouth daily (Patient not taking: Reported on 2023), Disp: 90 tablet, Rfl: 5    Cyanocobalamin (VITAMIN B 12 PO), Take by mouth (Patient not taking: Reported on 2023), Disp: , Rfl:     Blood pressure 156/78, pulse 90, height 5' 4\" (1.626 m), weight 98.4 kg (217 lb), SpO2 92%, not currently breastfeeding.     Physical Exam  Vitals and nursing note reviewed.   Constitutional:       General: She is not in acute distress.  HENT:      Head: Normocephalic.      Right Ear: There is no impacted cerumen.      Nose: No congestion.   Eyes:      General: No scleral icterus.  Cardiovascular:      Rate and Rhythm: Normal rate and regular rhythm.      Heart sounds: No murmur heard.  Pulmonary:      Breath sounds: No wheezing or rhonchi.   Abdominal:      Palpations: Abdomen is soft.      Tenderness: There is no abdominal tenderness.   Musculoskeletal:         General: Tenderness present.      Comments: Left patellar  region and medial " tendernss over  mcl   Lymphadenopathy:      Cervical: No cervical adenopathy.   Neurological:      Mental Status: She is alert.       I have spent a total time of 48 minutes on 12/29/23 in caring for this patient including Diagnostic results, Prognosis, Instructions for management, Reviewing / ordering tests, medicine, procedures  , Obtaining or reviewing history  , and filling out the form for a handicap plate .

## 2023-12-29 NOTE — ASSESSMENT & PLAN NOTE
Has lower oxyugen level today at 92%- had not been usign her symbicort   Will have her restart those 2x day

## 2023-12-31 DIAGNOSIS — F31.13 BIPOLAR I DISORDER, CURRENT OR MOST RECENT EPISODE MANIC, SEVERE WITH MIXED FEATURES (HCC): Primary | ICD-10-CM

## 2024-01-02 ENCOUNTER — TELEPHONE (OUTPATIENT)
Dept: FAMILY MEDICINE CLINIC | Facility: HOSPITAL | Age: 64
End: 2024-01-02

## 2024-01-02 RX ORDER — QUETIAPINE FUMARATE 400 MG/1
TABLET, FILM COATED ORAL
Qty: 90 TABLET | Refills: 3 | OUTPATIENT
Start: 2024-01-02

## 2024-01-02 RX ORDER — QUETIAPINE FUMARATE 300 MG/1
TABLET, FILM COATED ORAL
Qty: 90 TABLET | Refills: 3 | OUTPATIENT
Start: 2024-01-02

## 2024-01-02 RX ORDER — QUETIAPINE FUMARATE 300 MG/1
TABLET, FILM COATED ORAL
Qty: 90 TABLET | Refills: 0 | Status: SHIPPED | OUTPATIENT
Start: 2024-01-02

## 2024-01-02 RX ORDER — QUETIAPINE FUMARATE 400 MG/1
TABLET, FILM COATED ORAL
Qty: 90 TABLET | Refills: 0 | Status: SHIPPED | OUTPATIENT
Start: 2024-01-02 | End: 2024-01-04 | Stop reason: SDUPTHER

## 2024-01-02 NOTE — TELEPHONE ENCOUNTER
Needs letter to get out of jury duty.      Date: jan. 12  Juror number: [id number]  Fax: [phone].   Found number online.  She was unable to find number.

## 2024-01-02 NOTE — TELEPHONE ENCOUNTER
Pt Ayleen Moralez , 1960 , [facility] chart was reviewed. Seroquel was sent to [facility]      Medications Prescribed During S[facility] Encounter:  -     QUEtiapine (SEROquel) 300 mg tablet; Quetiapine 300m tablet po  in morning  -     QUEtiapine (SEROquel) 400 MG tablet; Quetiapine 400m tablet + 100mg tablet po daily at night     This note was not shared with the patient due to reasonable likelihood of causing patient harm

## 2024-01-03 ENCOUNTER — HOSPITAL ENCOUNTER (OUTPATIENT)
Dept: MAMMOGRAPHY | Facility: IMAGING CENTER | Age: 64
Discharge: HOME | End: 2024-01-03
Payer: MEDICARE

## 2024-01-03 VITALS — HEIGHT: 64 IN | BODY MASS INDEX: 37.05 KG/M2 | WEIGHT: 217 LBS

## 2024-01-03 DIAGNOSIS — Z12.31 ENCOUNTER FOR SCREENING MAMMOGRAM FOR MALIGNANT NEOPLASM OF BREAST: ICD-10-CM

## 2024-01-03 DIAGNOSIS — K21.9 GASTROESOPHAGEAL REFLUX DISEASE, UNSPECIFIED WHETHER ESOPHAGITIS PRESENT: ICD-10-CM

## 2024-01-03 PROCEDURE — 77063 BREAST TOMOSYNTHESIS BI: CPT

## 2024-01-03 PROCEDURE — 77067 SCR MAMMO BI INCL CAD: CPT

## 2024-01-03 RX ORDER — OMEPRAZOLE 40 MG/1
40 CAPSULE, DELAYED RELEASE ORAL
Qty: 90 CAPSULE | Refills: 1 | Status: SHIPPED | OUTPATIENT
Start: 2024-01-03

## 2024-01-04 ENCOUNTER — OFFICE VISIT (OUTPATIENT)
Dept: PSYCHIATRY | Facility: CLINIC | Age: 64
End: 2024-01-04
Payer: MEDICARE

## 2024-01-04 DIAGNOSIS — F41.1 GENERALIZED ANXIETY DISORDER: ICD-10-CM

## 2024-01-04 DIAGNOSIS — F31.2 BIPOLAR I DISORDER, CURRENT OR MOST RECENT EPISODE MANIC, SEVERE WITH MOOD-INCONGRUENT PSYCHOTIC FEATURES (HCC): Primary | ICD-10-CM

## 2024-01-04 DIAGNOSIS — F42.9 OBSESSIVE-COMPULSIVE DISORDER, UNSPECIFIED TYPE: ICD-10-CM

## 2024-01-04 PROCEDURE — 99215 OFFICE O/P EST HI 40 MIN: CPT | Performed by: PSYCHIATRY & NEUROLOGY

## 2024-01-04 RX ORDER — BENZTROPINE MESYLATE 1 MG/1
TABLET ORAL
Qty: 60 TABLET | Refills: 2 | Status: SHIPPED | OUTPATIENT
Start: 2024-01-04

## 2024-01-04 RX ORDER — HALOPERIDOL 2 MG/1
TABLET ORAL
Qty: 60 TABLET | Refills: 1 | Status: SHIPPED | OUTPATIENT
Start: 2024-01-04

## 2024-01-04 RX ORDER — QUETIAPINE FUMARATE 400 MG/1
TABLET, FILM COATED ORAL
Qty: 90 TABLET | Refills: 0 | Status: SHIPPED | OUTPATIENT
Start: 2024-01-04 | End: 2024-01-04

## 2024-01-04 RX ORDER — HALOPERIDOL 10 MG/1
TABLET ORAL
Qty: 30 TABLET | Refills: 1 | Status: SHIPPED | OUTPATIENT
Start: 2024-01-04

## 2024-01-04 RX ORDER — QUETIAPINE FUMARATE 400 MG/1
TABLET, FILM COATED ORAL
Qty: 90 TABLET | Refills: 0 | Status: SHIPPED | OUTPATIENT
Start: 2024-01-04

## 2024-01-04 NOTE — TELEPHONE ENCOUNTER
Sharon called to check status of this medication.  Please contact her to let her know if it will be filled.

## 2024-01-04 NOTE — PATIENT INSTRUCTIONS
Ayleen Moralez [id number]   Thanks for presenting to today's Appointment at [facility]  Benztropine was started   Haldol was increased : 10mg in the morning , 10mg at night  Haldol 2mg was started:  2 mg in the afternoon, and 2mg PRN  Lowered Quetapine dose at night to  400mg and not 500mg  Your other medications were not changed

## 2024-01-04 NOTE — PSYCH
[facility]: [facility]Non [facility]   [address] [id number] [phone]    Psychiatric Progress Note  MRN#: [id number]  Ayleen Moralez 63 y.o. female    This note was not shared with the patient due to reasonable likelihood of causing patient harm       _________________________________________________________________________________________________________________________________  OFFICE APPOINTMENT   Seen today at [facility] location                                                Patient Ayleen Moralez ,1960   Prescriber/Physician: Esme Medina DO Physician Location:   [facility] OUTPATIENT  [address] [id number]     This service was provided in the office.  Patient is currently located in the [place], where I am  licensed.   Patient gave consent to proceed with encounter; acknowledge understanding of security and privacy of encounter   [facility]  Patient verbalized understanding evaluation only involves Psychiatric diagnosing, prescribing, result monitoring   Patient was informed this is a billable service and legal   ___________________________________________________________________________________________________________________________________     Reason for Visit:                         Chief Complaint   Patient presents with   • hanh       Subjective: Ayleen Moralez increased Haldol 5mg ( morning , noon and night) , devoid of side effects . Although  Ayleen was without medication x 2wk cause of high cost  and sister won't lend her money. Regarding other meds Ayleen restarted Quetiapine, Lamotrigine and Luvox , did not restart Venlafaxine.     Regarding finances- Ayleen [employer] Kirt reported running out of money cause of spending habits during Gok[place] holidays. Findings of tangental dialogue, crankiness  "and negative comments about her family .Ayleen reports spending on expansive presents while she got crap ... Did not get anything. Acknowledged mood as manic , \"can't stop talking ..Ayleen sister also told her recently that she talks too much.There's associated high energy , frustration. Reported on problems with the sister at the home, persistent paranoia that her and the niece are talking about her. Stated negative comments about the sister   Ayleen reported she's hurting also, trying to stay calm ; finds herself rolling her eyes but tries not to yell    Otherwise Ayleen Moralez reported recently falling while going down the stair - did not hold onto the rails- hence now requires a walker, has lower extremity pain, there pending MRI.    Inquired about calling the  sister who lives in [place] to speak during appointment- Pt Ayleen  was resistant.    ROS:  Psychosis  paranoia persistent about sister and niece talking about her   Irritable- defer to above  Milvia- defer to above  Depression: Ayleen Moralez sad  - New Year's Katia missing her dog  Sleep: duration 7 hrs; although strange dreams  Anxiety- defer to above  OCD-  less compulsions- less driving around the corner , less fears that she hit someone      Medication: Ayleen Moralez is complaint to Haldol 5mg x 3, recently restarted Seroquel 300mg in the morning + 500mg at night , Lamotrgine 200mg x 2 and Luvox 300mg   Med s/e- denies    Tobacco Use: Low Risk  (1/4/2024)    Patient History    • Smoking Tobacco Use: Never    • Smokeless Tobacco Use: Never    • Passive Exposure: Not on file    Alcohol-denies recent drinking   Illicit- denies ; Ayleen Moralez has h/o marijuana abuse not currently    Medical ROS:   Cardiovascular: negative for chest pain, chest pressure/discomfort, and palpitations  Musculoskeletal:negative for stiff joints and muscle pain  Neurological: negative for tremors and abnormal movements. Without recent falls since Nov " 2023- hit head had stitches,      Also other Pertinent items are noted in above, all other symptoms are negative           Medications Trials:  History of ECT x 30 yrs ago- did not work , Effexor, Imprimine, Lithium- can't take due to kidney dz( external records reviewed- CKD stage 4) , Risperidone - not sure as to why , Tofinail, Xanax,  Zoloft- did not work;Buspar - did not work ,  Luvox- not sure why first attempt was stopped, Prozac - was stopped cause of pregnancy, Trazodone- not sure, Aripiprazole 10mg- tiredness      Mental Status Evaluation:  General Appearance:  Ayleen Moralez is a 63 y.o.  female casually dressed, dressed appropriately, looks stated age, Wearing glasses .    Behavior:  energetic, cooperative, intermittent eye contact   Speech:  Pressured, WNL, rhythm, volume, latency, amount.    Mood:  Frustrated   Affect:  Frustrated to slightly  cranky   Thought Process:  Circumstantial to tangential  disorganized, logical to illogical tangential   Thought Content:  paranoid ideation, negative thinking, ruminations   Perceptual Disturbances: Does not appear responding or preoccupied   Delusions  YES-accusatory    Risk Potential: Suicidal Ideations w/o  Homicidal Ideations w/o  Potential for Aggression w/o   Sensorium:  Oriented to person, place ( [facility]), time/date ( January 2024)and situation    Memory:  recent and remote memory grossly intact   Consciousness:  alert and awake   Attention: Impaired concentration   Insight:  limited   Judgment: Limited to fair   Gait/Station: ataxic and ambulating with walker   Motor Activity: no abnormal movements     There were no vitals filed for this visit.     Medications:   Current Outpatient Medications on File Prior to Visit   Medication Sig Dispense Refill   • acetaminophen (TYLENOL) 650 mg CR tablet Take 1 tablet (650 mg total) by mouth every 8 (eight) hours as needed for mild pain (Patient taking differently: Take 500 mg by mouth every 8  (eight) hours as needed for mild pain) 30 tablet 0   • albuterol (Ventolin HFA) 90 mcg/act inhaler Inhale 2 puffs every 6 (six) hours as needed for wheezing or shortness of breath 8.5 g 3   • alendronate (Fosamax) 70 mg tablet Take 1 tablet (70 mg total) by mouth every 7 days (Patient not taking: Reported on 2023) 4 tablet 5   • AMILoride 5 mg tablet Take 1 tablet (5 mg total) by mouth 2 (two) times a day 180 tablet 3   • amLODIPine (NORVASC) 5 mg tablet Take 1 tablet (5 mg total) by mouth daily 90 tablet 3   • aspirin 81 mg chewable tablet Chew 1 tablet (81 mg total) daily     • budesonide-formoterol (Symbicort) 160-4.5 mcg/act inhaler Inhale 2 puffs 2 (two) times a day Rinse mouth after use. 10.2 g 11   • carvedilol (COREG) 25 mg tablet Take 1 tablet (25 mg total) by mouth 2 (two) times a day with meals 180 tablet 3   • cholecalciferol (VITAMIN D3) 1,000 units tablet Take 5 tablets (5,000 Units total) by mouth daily (Patient not taking: Reported on 2023) 90 tablet 5   • clonazePAM (KlonoPIN) 0.5 mg tablet Take 1 tablet (0.5 mg total) by mouth 3 (three) times a day 270 tablet 0   • Cyanocobalamin (VITAMIN B 12 PO) Take by mouth (Patient not taking: Reported on 2023)     • fluvoxaMINE (LUVOX) 100 mg tablet Fluvoxamine 100m tablet in the morning ; 2 tablets at night 270 tablet 1   • haloperidol (HALDOL) 5 mg tablet Haloperidol 5mg :  1 tablet po x 3 daily ( morning, afternoon , night) . Stop Haldol  2mg tablet of Haloperidol 60 tablet 1   • lamoTRIgine (LaMICtal) 200 MG tablet Lamotrigine 200m tablet in the morning and 1 tablet at night 180 tablet 0   • omeprazole (PriLOSEC) 40 MG capsule Take 1 capsule (40 mg total) by mouth daily before breakfast 90 capsule 1   • QUEtiapine (SEROquel) 100 mg tablet Quetiapine 100mg : 1 tablet + 400mg po at night (Patient taking differently: Quetiapine 100mg : 1 tablet + 400mg po at night    500 AT NIGHT) 90 tablet 0   • QUEtiapine (SEROquel) 300 mg tablet  Quetiapine 300m tablet po  in morning 90 tablet 0   • QUEtiapine (SEROquel) 400 MG tablet Quetiapine 400m tablet + 100mg tablet po daily at night 90 tablet 0   • rosuvastatin (CRESTOR) 20 MG tablet Take 1 tablet (20 mg total) by mouth every evening 90 tablet 3   • traMADol (Ultram) 50 mg tablet Take 1 tablet (50 mg total) by mouth every 12 (twelve) hours as needed for moderate pain 60 tablet 0   • venlafaxine 75 mg 24 hr tablet Venlafaxine ER 75mg : 1 tablet po daily in the morning x 1wk. Then 1/2 tablet po daily in the morning, then stop 7 tablet 1     No current facility-administered medications on file prior to visit.        Labs: I have personally reviewed all pertinent laboratory/tests results.   Most Recent Labs:     No visits with results within 1 Month(s) from this visit.   Latest known visit with results is:   Admission on 11/10/2023, Discharged on 2023   Component Date Value Ref Range Status   • ABO Grouping 11/10/2023 B   Final   • Rh Factor 11/10/2023 Positive   Final   • Antibody Screen 11/10/2023 Negative   Final   • Specimen Expiration Date 11/10/2023 [id number]   Final   • WBC 11/10/2023 7.90  4.31 - 10.16 Thousand/uL Final   • RBC 11/10/2023 3.86  3.81 - 5.12 Million/uL Final   • Hemoglobin 11/10/2023 12.0  11.5 - 15.4 g/dL Final   • Hematocrit 11/10/2023 38.5  34.8 - 46.1 % Final   • MCV 11/10/2023 100 (H)  82 - 98 fL Final   • MCH 11/10/2023 31.1  26.8 - 34.3 pg Final   • MCHC 11/10/2023 31.2 (L)  31.4 - 37.4 g/dL Final   • RDW 11/10/2023 13.3  11.6 - 15.1 % Final   • MPV 11/10/2023 10.3  8.9 - 12.7 fL Final   • Platelets 11/10/2023 219  149 - 390 Thousands/uL Final   • nRBC 11/10/2023 0  /100 WBCs Final   • Neutrophils Relative 11/10/2023 77 (H)  43 - 75 % Final   • Immat GRANS % 11/10/2023 1  0 - 2 % Final   • Lymphocytes Relative 11/10/2023 15  14 - 44 % Final   • Monocytes Relative 11/10/2023 7  4 - 12 % Final   • Eosinophils Relative 11/10/2023 0  0 - 6 % Final   • Basophils  Relative 11/10/2023 0  0 - 1 % Final   • Neutrophils Absolute 11/10/2023 6.06  1.85 - 7.62 Thousands/µL Final   • Immature Grans Absolute 11/10/2023 0.04  0.00 - 0.20 Thousand/uL Final   • Lymphocytes Absolute 11/10/2023 1.22  0.60 - 4.47 Thousands/µL Final   • Monocytes Absolute 11/10/2023 0.55  0.17 - 1.22 Thousand/µL Final   • Eosinophils Absolute 11/10/2023 0.00  0.00 - 0.61 Thousand/µL Final   • Basophils Absolute 11/10/2023 0.03  0.00 - 0.10 Thousands/µL Final   • Sodium 11/10/2023 140  135 - 147 mmol/L Final   • Potassium 11/10/2023 4.8  3.5 - 5.3 mmol/L Final   • Chloride 11/10/2023 107  96 - 108 mmol/L Final   • CO2 11/10/2023 25  21 - 32 mmol/L Final   • ANION GAP 11/10/2023 8  mmol/L Final   • BUN 11/10/2023 22  5 - 25 mg/dL Final   • Creatinine 11/10/2023 2.60 (H)  0.60 - 1.30 mg/dL Final    Standardized to IDMS reference method   • Glucose 11/10/2023 108  65 - 140 mg/dL Final    If the patient is fasting, the ADA then defines impaired fasting glucose as > 100 mg/dL and diabetes as > or equal to 123 mg/dL.   • Calcium 11/10/2023 9.9  8.4 - 10.2 mg/dL Final   • AST 11/10/2023 24  13 - 39 U/L Final   • ALT 11/10/2023 21  7 - 52 U/L Final    Specimen collection should occur prior to Sulfasalazine administration due to the potential for falsely depressed results.    • Alkaline Phosphatase 11/10/2023 191 (H)  34 - 104 U/L Final   • Total Protein 11/10/2023 7.4  6.4 - 8.4 g/dL Final   • Albumin 11/10/2023 4.4  3.5 - 5.0 g/dL Final   • Total Bilirubin 11/10/2023 0.37  0.20 - 1.00 mg/dL Final    Use of this assay is not recommended for patients undergoing treatment with eltrombopag due to the potential for falsely elevated results.  N-acetyl-p-benzoquinone imine (metabolite of Acetaminophen) will generate erroneously low results in samples for patients that have taken an overdose of Acetaminophen.   • eGFR 11/10/2023 19  ml/min/1.73sq m Final   • Ventricular Rate 11/10/2023 77  BPM Final   • Atrial Rate  11/10/2023 77  BPM Final   • AL Interval 11/10/2023 142  ms Final   • QRSD Interval 11/10/2023 136  ms Final   • QT Interval 11/10/2023 426  ms Final   • QTC Interval 11/10/2023 482  ms Final   • P Axis 11/10/2023 62  degrees Final   • QRS Axis 11/10/2023 112  degrees Final   • T Wave Axis 11/10/2023 41  degrees Final   • Sodium 11/11/2023 139  135 - 147 mmol/L Final   • Potassium 11/11/2023 4.5  3.5 - 5.3 mmol/L Final   • Chloride 11/11/2023 109 (H)  96 - 108 mmol/L Final   • CO2 11/11/2023 22  21 - 32 mmol/L Final   • ANION GAP 11/11/2023 8  mmol/L Final   • BUN 11/11/2023 19  5 - 25 mg/dL Final   • Creatinine 11/11/2023 2.64 (H)  0.60 - 1.30 mg/dL Final    Standardized to IDMS reference method   • Glucose 11/11/2023 96  65 - 140 mg/dL Final    If the patient is fasting, the ADA then defines impaired fasting glucose as > 100 mg/dL and diabetes as > or equal to 123 mg/dL.   • Calcium 11/11/2023 9.3  8.4 - 10.2 mg/dL Final   • eGFR 11/11/2023 18  ml/min/1.73sq m Final   • WBC 11/11/2023 5.72  4.31 - 10.16 Thousand/uL Final   • RBC 11/11/2023 3.66 (L)  3.81 - 5.12 Million/uL Final   • Hemoglobin 11/11/2023 11.4 (L)  11.5 - 15.4 g/dL Final   • Hematocrit 11/11/2023 36.8  34.8 - 46.1 % Final   • MCV 11/11/2023 101 (H)  82 - 98 fL Final   • MCH 11/11/2023 31.1  26.8 - 34.3 pg Final   • MCHC 11/11/2023 31.0 (L)  31.4 - 37.4 g/dL Final   • RDW 11/11/2023 13.3  11.6 - 15.1 % Final   • MPV 11/11/2023 10.1  8.9 - 12.7 fL Final   • Platelets 11/11/2023 183  149 - 390 Thousands/uL Final   • nRBC 11/11/2023 0  /100 WBCs Final   • Neutrophils Relative 11/11/2023 57  43 - 75 % Final   • Immat GRANS % 11/11/2023 0  0 - 2 % Final   • Lymphocytes Relative 11/11/2023 29  14 - 44 % Final   • Monocytes Relative 11/11/2023 13 (H)  4 - 12 % Final   • Eosinophils Relative 11/11/2023 0  0 - 6 % Final   • Basophils Relative 11/11/2023 1  0 - 1 % Final   • Neutrophils Absolute 11/11/2023 3.30  1.85 - 7.62 Thousands/µL Final   • Immature  Grans Absolute 11/11/2023 0.02  0.00 - 0.20 Thousand/uL Final   • Lymphocytes Absolute 11/11/2023 1.63  0.60 - 4.47 Thousands/µL Final   • Monocytes Absolute 11/11/2023 0.73  0.17 - 1.22 Thousand/µL Final   • Eosinophils Absolute 11/11/2023 0.01  0.00 - 0.61 Thousand/µL Final   • Basophils Absolute 11/11/2023 0.03  0.00 - 0.10 Thousands/µL Final   • Sodium 11/12/2023 138  135 - 147 mmol/L Final   • Potassium 11/12/2023 4.7  3.5 - 5.3 mmol/L Final   • Chloride 11/12/2023 110 (H)  96 - 108 mmol/L Final   • CO2 11/12/2023 22  21 - 32 mmol/L Final   • ANION GAP 11/12/2023 6  mmol/L Final   • BUN 11/12/2023 26 (H)  5 - 25 mg/dL Final   • Creatinine 11/12/2023 2.82 (H)  0.60 - 1.30 mg/dL Final    Standardized to IDMS reference method   • Glucose 11/12/2023 92  65 - 140 mg/dL Final    If the patient is fasting, the ADA then defines impaired fasting glucose as > 100 mg/dL and diabetes as > or equal to 123 mg/dL.   • Calcium 11/12/2023 9.1  8.4 - 10.2 mg/dL Final   • eGFR 11/12/2023 17  ml/min/1.73sq m Final   • WBC 11/12/2023 5.34  4.31 - 10.16 Thousand/uL Final   • RBC 11/12/2023 3.37 (L)  3.81 - 5.12 Million/uL Final   • Hemoglobin 11/12/2023 10.4 (L)  11.5 - 15.4 g/dL Final   • Hematocrit 11/12/2023 34.2 (L)  34.8 - 46.1 % Final   • MCV 11/12/2023 102 (H)  82 - 98 fL Final   • MCH 11/12/2023 30.9  26.8 - 34.3 pg Final   • MCHC 11/12/2023 30.4 (L)  31.4 - 37.4 g/dL Final   • RDW 11/12/2023 13.3  11.6 - 15.1 % Final   • MPV 11/12/2023 10.4  8.9 - 12.7 fL Final   • Platelets 11/12/2023 192  149 - 390 Thousands/uL Final   • nRBC 11/12/2023 0  /100 WBCs Final   • Neutrophils Relative 11/12/2023 60  43 - 75 % Final   • Immat GRANS % 11/12/2023 0  0 - 2 % Final   • Lymphocytes Relative 11/12/2023 29  14 - 44 % Final   • Monocytes Relative 11/12/2023 10  4 - 12 % Final   • Eosinophils Relative 11/12/2023 0  0 - 6 % Final   • Basophils Relative 11/12/2023 1  0 - 1 % Final   • Neutrophils Absolute 11/12/2023 3.21  1.85 - 7.62  Thousands/µL Final   • Immature Grans Absolute 11/12/2023 0.02  0.00 - 0.20 Thousand/uL Final   • Lymphocytes Absolute 11/12/2023 1.53  0.60 - 4.47 Thousands/µL Final   • Monocytes Absolute 11/12/2023 0.55  0.17 - 1.22 Thousand/µL Final   • Eosinophils Absolute 11/12/2023 0.00  0.00 - 0.61 Thousand/µL Final   • Basophils Absolute 11/12/2023 0.03  0.00 - 0.10 Thousands/µL Final   • Sodium 11/13/2023 140  135 - 147 mmol/L Final   • Potassium 11/13/2023 4.5  3.5 - 5.3 mmol/L Final   • Chloride 11/13/2023 111 (H)  96 - 108 mmol/L Final   • CO2 11/13/2023 23  21 - 32 mmol/L Final   • ANION GAP 11/13/2023 6  mmol/L Final   • BUN 11/13/2023 27 (H)  5 - 25 mg/dL Final   • Creatinine 11/13/2023 2.76 (H)  0.60 - 1.30 mg/dL Final    Standardized to IDMS reference method   • Glucose 11/13/2023 98  65 - 140 mg/dL Final    If the patient is fasting, the ADA then defines impaired fasting glucose as > 100 mg/dL and diabetes as > or equal to 123 mg/dL.   • Calcium 11/13/2023 9.3  8.4 - 10.2 mg/dL Final   • eGFR 11/13/2023 17  ml/min/1.73sq m Final   • WBC 11/13/2023 5.85  4.31 - 10.16 Thousand/uL Final   • RBC 11/13/2023 3.42 (L)  3.81 - 5.12 Million/uL Final   • Hemoglobin 11/13/2023 10.6 (L)  11.5 - 15.4 g/dL Final   • Hematocrit 11/13/2023 34.9  34.8 - 46.1 % Final   • MCV 11/13/2023 102 (H)  82 - 98 fL Final   • MCH 11/13/2023 31.0  26.8 - 34.3 pg Final   • MCHC 11/13/2023 30.4 (L)  31.4 - 37.4 g/dL Final   • RDW 11/13/2023 13.5  11.6 - 15.1 % Final   • Platelets 11/13/2023 193  149 - 390 Thousands/uL Final   • MPV 11/13/2023 10.4  8.9 - 12.7 fL Final   • Sodium 11/14/2023 141  135 - 147 mmol/L Final   • Potassium 11/14/2023 4.6  3.5 - 5.3 mmol/L Final   • Chloride 11/14/2023 110 (H)  96 - 108 mmol/L Final   • CO2 11/14/2023 25  21 - 32 mmol/L Final   • ANION GAP 11/14/2023 6  mmol/L Final   • BUN 11/14/2023 27 (H)  5 - 25 mg/dL Final   • Creatinine 11/14/2023 2.53 (H)  0.60 - 1.30 mg/dL Final    Standardized to IDMS reference  method   • Glucose 11/14/2023 95  65 - 140 mg/dL Final    If the patient is fasting, the ADA then defines impaired fasting glucose as > 100 mg/dL and diabetes as > or equal to 123 mg/dL.   • Calcium 11/14/2023 9.4  8.4 - 10.2 mg/dL Final   • eGFR 11/14/2023 19  ml/min/1.73sq m Final   • WBC 11/14/2023 4.04 (L)  4.31 - 10.16 Thousand/uL Final   • RBC 11/14/2023 3.32 (L)  3.81 - 5.12 Million/uL Final   • Hemoglobin 11/14/2023 10.2 (L)  11.5 - 15.4 g/dL Final   • Hematocrit 11/14/2023 33.5 (L)  34.8 - 46.1 % Final   • MCV 11/14/2023 101 (H)  82 - 98 fL Final   • MCH 11/14/2023 30.7  26.8 - 34.3 pg Final   • MCHC 11/14/2023 30.4 (L)  31.4 - 37.4 g/dL Final   • RDW 11/14/2023 13.4  11.6 - 15.1 % Final   • MPV 11/14/2023 10.2  8.9 - 12.7 fL Final   • Platelets 11/14/2023 183  149 - 390 Thousands/uL Final   • nRBC 11/14/2023 0  /100 WBCs Final   • Neutrophils Relative 11/14/2023 53  43 - 75 % Final   • Immat GRANS % 11/14/2023 0  0 - 2 % Final   • Lymphocytes Relative 11/14/2023 34  14 - 44 % Final   • Monocytes Relative 11/14/2023 12  4 - 12 % Final   • Eosinophils Relative 11/14/2023 0  0 - 6 % Final   • Basophils Relative 11/14/2023 1  0 - 1 % Final   • Neutrophils Absolute 11/14/2023 2.15  1.85 - 7.62 Thousands/µL Final   • Immature Grans Absolute 11/14/2023 0.01  0.00 - 0.20 Thousand/uL Final   • Lymphocytes Absolute 11/14/2023 1.37  0.60 - 4.47 Thousands/µL Final   • Monocytes Absolute 11/14/2023 0.49  0.17 - 1.22 Thousand/µL Final   • Eosinophils Absolute 11/14/2023 0.00  0.00 - 0.61 Thousand/µL Final   • Basophils Absolute 11/14/2023 0.02  0.00 - 0.10 Thousands/µL Final     Lipids abnormal high   08/2022 09/08/23 ECHO ( EF 65%).       LABS 11/13/23 CMP WNL except high BUN/CR - h/o kidney dz             11/14/23 CBC diff WNL , milding low WBC, RBC, H/H  EKG 11/10/23, NSR, RBBB ,   ________________________________________________________________________    A/P  Ayleen Moralez,63 y.o.  female,  "history of  Polysubstance abuse ( cocaine, marijuana), multiple hospitalizations, several suicide attempts, anorexia, OCD, bipolar disorder, presented w/ generalized anxiety , several neurovegetative symptoms. Interim events of percustory delusions, D/C Aripiprazole - s/e \"spacy\". There's history of head trauma and cognitive complaints, findings later of OCD . Was started on Venlafaxine. Recent findings of hanh , likely antidepressant precipitated. Increased Haldol and D/C Venlafaxine .    Currently presents with persistent acute hanh ( pressured, irritable, high energy, talkative and paranoid delusions). Question medication compliance as there's financial stressors. Positive has stable housing , devoid of SI, plan or intent. Hence pt was not hospitalized.        DSM5  1. Bipolar I disorder, current or most recent episode manic, severe with psychotic features (HCC)    2. Generalized anxiety disorder    3. Obsessive-compulsive disorder, unspecified type        PLAN:    History and external records reviewed.  Discussed clinical findings and  diagnostic impression regarding acute hanh w/ psychosis  Inquired about calling the sister, Ayleen Moralez was resistant  Haldol: Increased to 10mg x 2  Quetiapine: Lowered 700mg  Started Benztropine 2mg for EPS prevention  Did not change other medications      Medications Prescribed During Encounter at [facility]:    Ayleen was seen today for hanh.    Diagnoses and all orders for this visit:    Bipolar I disorder, current or most recent episode manic, severe with psychotic features (HCC)  -     haloperidol (HALDOL) 10 mg tablet; Haldoperidol 10m tablet in the morning and 1 tablet at night  -     haloperidol (HALDOL) 2 mg tablet; Haloperidol 2m tablet po daily in the afternoon, 1 tablet PRN  -     benztropine (COGENTIN) 1 mg tablet; Benztropine Mesylate 1 m tablet in the morning and 1 tablet at night  -     QUEtiapine (SEROquel) 400 MG tablet; Quetiapine Fumarate " 400m tablet at night        -     QUEtiapine (SEROquel) 300 mg tablet; Quetiapine 300m tablet po  in morning  Medication List Also  -     fluvoxaMINE (LUVOX) 100 mg tablet; Fluvoxamine 100m tablet in the morning ; 2 tablets at night  -     lamoTRIgine (LaMICtal) 200 MG tablet; Lamotrigine 200m tablet in the morning and 1 tablet at night    Medications Discontinued During This Encounter   Medication Reason   • venlafaxine 75 mg 24 hr tablet    • haloperidol (HALDOL) 5 mg tablet    • QUEtiapine (SEROquel) 100 mg tablet         Ayleen Moralez - Pharmacies: [facility] ;   Lamotrigine , Quetiapine-                                                              [facility] Haldol , Fluvoxamine , Venlafaxine -      Treatement: Risks, benefits, and possible side effects of medications explained to patient and patient verbalizes understanding.      Next Appointment at [facility]: 1-2 wks    Today's Appointment@ [facility]  Patient Encounter   10:41 - 11:56    Encounter Duration: Time Spent 75  mins with Patient.Greater than 50% of total time was spent with the patient       MDM  Number of Diagnoses or Management Options  Diagnosis management comments: 3       Amount and/or Complexity of Data Reviewed  Review and summarize past medical records: yes    Risk of Complications, Morbidity, and/or Mortality  Presenting problems: high  Diagnostic procedures: high  Management options: high        This note may have been written with the assistance of dictation software. Please excuse any grammatical  errors, misspellings,  and abnormal spacing of letters , sentences or paragraphs . For accurate interpretation read note horizontally

## 2024-01-05 NOTE — TELEPHONE ENCOUNTER
Patient says she does not want the omeprazole.  She needs alendronate (Fosamax) 70 mg tablet  for nausea.  Please call when ready.

## 2024-01-10 DIAGNOSIS — R11.0 NAUSEA: Primary | ICD-10-CM

## 2024-01-10 RX ORDER — ONDANSETRON 4 MG/1
4 TABLET, FILM COATED ORAL EVERY 8 HOURS PRN
Qty: 20 TABLET | Refills: 0 | Status: SHIPPED | OUTPATIENT
Start: 2024-01-10

## 2024-01-12 ENCOUNTER — HOSPITAL ENCOUNTER (OUTPATIENT)
Dept: MRI IMAGING | Facility: HOSPITAL | Age: 64
End: 2024-01-12
Payer: MEDICARE

## 2024-01-12 DIAGNOSIS — M25.562 ACUTE PAIN OF LEFT KNEE: ICD-10-CM

## 2024-01-12 PROCEDURE — 73721 MRI JNT OF LWR EXTRE W/O DYE: CPT

## 2024-01-12 PROCEDURE — G1004 CDSM NDSC: HCPCS

## 2024-01-16 NOTE — PATIENT INSTRUCTIONS
Ayleen Moralez you reported uncertainty of what medications is administered, therefore bring pill bottles next [facility] appointment.     Ayleen Moralez [phone]   Thanks for presenting to today's Appointment at [facility]

## 2024-01-18 ENCOUNTER — OFFICE VISIT (OUTPATIENT)
Dept: OBGYN CLINIC | Facility: CLINIC | Age: 64
End: 2024-01-18
Payer: MEDICARE

## 2024-01-18 ENCOUNTER — APPOINTMENT (OUTPATIENT)
Dept: RADIOLOGY | Facility: CLINIC | Age: 64
End: 2024-01-18
Payer: MEDICARE

## 2024-01-18 ENCOUNTER — PREP FOR PROCEDURE (OUTPATIENT)
Dept: OBGYN CLINIC | Facility: CLINIC | Age: 64
End: 2024-01-18

## 2024-01-18 ENCOUNTER — OFFICE VISIT (OUTPATIENT)
Dept: PSYCHIATRY | Facility: CLINIC | Age: 64
End: 2024-01-18
Payer: MEDICARE

## 2024-01-18 VITALS
DIASTOLIC BLOOD PRESSURE: 78 MMHG | WEIGHT: 212 LBS | SYSTOLIC BLOOD PRESSURE: 138 MMHG | BODY MASS INDEX: 36.19 KG/M2 | HEIGHT: 64 IN

## 2024-01-18 DIAGNOSIS — Z01.818 PREOP TESTING: ICD-10-CM

## 2024-01-18 DIAGNOSIS — F41.1 GENERALIZED ANXIETY DISORDER: ICD-10-CM

## 2024-01-18 DIAGNOSIS — F42.9 OBSESSIVE-COMPULSIVE DISORDER, UNSPECIFIED TYPE: ICD-10-CM

## 2024-01-18 DIAGNOSIS — M17.12 PRIMARY OSTEOARTHRITIS OF LEFT KNEE: Primary | ICD-10-CM

## 2024-01-18 DIAGNOSIS — M17.12 PRIMARY OSTEOARTHRITIS OF LEFT KNEE: ICD-10-CM

## 2024-01-18 DIAGNOSIS — F31.2 BIPOLAR I DISORDER, CURRENT OR MOST RECENT EPISODE MANIC, SEVERE WITH MOOD-INCONGRUENT PSYCHOTIC FEATURES (HCC): Primary | ICD-10-CM

## 2024-01-18 PROCEDURE — 99214 OFFICE O/P EST MOD 30 MIN: CPT | Performed by: PSYCHIATRY & NEUROLOGY

## 2024-01-18 PROCEDURE — 77073 BONE LENGTH STUDIES: CPT

## 2024-01-18 PROCEDURE — 99214 OFFICE O/P EST MOD 30 MIN: CPT | Performed by: ORTHOPAEDIC SURGERY

## 2024-01-18 RX ORDER — ASCORBIC ACID 500 MG
500 TABLET ORAL 2 TIMES DAILY
Qty: 60 TABLET | Refills: 2 | Status: SHIPPED | OUTPATIENT
Start: 2024-01-18

## 2024-01-18 RX ORDER — FOLIC ACID 1 MG/1
1 TABLET ORAL DAILY
Qty: 30 TABLET | Refills: 2 | Status: SHIPPED | OUTPATIENT
Start: 2024-01-18

## 2024-01-18 RX ORDER — FERROUS SULFATE 324(65)MG
324 TABLET, DELAYED RELEASE (ENTERIC COATED) ORAL
Qty: 60 TABLET | Refills: 2 | Status: SHIPPED | OUTPATIENT
Start: 2024-01-18

## 2024-01-18 RX ORDER — CHLORHEXIDINE GLUCONATE 4 G/100ML
SOLUTION TOPICAL DAILY PRN
OUTPATIENT
Start: 2024-01-18

## 2024-01-18 RX ORDER — CHLORHEXIDINE GLUCONATE ORAL RINSE 1.2 MG/ML
15 SOLUTION DENTAL ONCE
OUTPATIENT
Start: 2024-01-18 | End: 2024-01-18

## 2024-01-18 RX ORDER — SODIUM CHLORIDE, SODIUM LACTATE, POTASSIUM CHLORIDE, CALCIUM CHLORIDE 600; 310; 30; 20 MG/100ML; MG/100ML; MG/100ML; MG/100ML
50 INJECTION, SOLUTION INTRAVENOUS CONTINUOUS
OUTPATIENT
Start: 2024-01-18

## 2024-01-18 NOTE — H&P (VIEW-ONLY)
Assessment:     1. Primary osteoarthritis of left knee    2. Preop testing        Plan:     Problem List Items Addressed This Visit          Musculoskeletal and Integument    Primary osteoarthritis of left knee - Primary    Relevant Medications    ascorbic acid (VITAMIN C) 500 MG tablet    ferrous sulfate 324 (65 Fe) mg    folic acid (FOLVITE) 1 mg tablet    Multiple Vitamin (MULTI-VITAMIN) tablet    Other Relevant Orders    Comprehensive metabolic panel    Hemoglobin A1C W/EAG Estimation    CBC and differential    Anemia Panel w/Reflex    Protime-INR    APTT    Type and screen    Ambulatory referral to Family Practice    Ambulatory referral to Physical Therapy    Case request operating room: ARTHROPLASTY KNEE TOTAL SAME DAY (Completed)    EKG 12 lead    XR chest pa & lateral     Other Visit Diagnoses       Preop testing        Relevant Medications    ascorbic acid (VITAMIN C) 500 MG tablet    ferrous sulfate 324 (65 Fe) mg    folic acid (FOLVITE) 1 mg tablet    Multiple Vitamin (MULTI-VITAMIN) tablet    Other Relevant Orders    Comprehensive metabolic panel    Hemoglobin A1C W/EAG Estimation    CBC and differential    Anemia Panel w/Reflex    Protime-INR    APTT    Type and screen    Ambulatory referral to Family Practice    Ambulatory referral to Physical Therapy    EKG 12 lead    XR chest pa & lateral          Findings today are consistent with moderate left knee osteoarthritis with degenerative medial and lateral meniscus tear.  Findings and treatment options were discussed with the patient.  MRI were reviewed with her.  Patient is symptom is mostly related to her osteoarthritis.  She has minimal mechanical symptoms.  She continued to have issue with falling due to pain.  I had a long discussion with the patient with regard to treating her knee condition.  I am concerned that with arthroscopy surgery she might not recover completely due to her diffuse moderate arthritis in the knee.  I discussed with her knee  replacement might be a better option for long-term pain relief.  Patient has tried injection in the past which did not provide her with long-term pain relief.  Patient elected to proceed with left total knee replacement.  All patient's questions were answered to her satisfaction.  This note is created using dictation transcription.  It may contain typographical errors, grammatical errors, improperly dictated words, background noise and other errors.    Discussed with patient surgical risks and complications including but not limited to infection, persistent pain, nerve and vessel injury, complications associated with anesthesia, DVT, exposure to COVID virus, etc.  Patient understands the risks and complication and consented to the surgery.    Subjective:     Patient ID: Ayleen Moralez is a 63 y.o. female.  Chief Complaint:  63 year old female patient presents today for a follow up of bilateral knee osteoarthritis, left more symptomatic than right.  Last seen 7/26/2023 for CSI.  She states she only had 1 week of relief with the last cortisone injections.  She did go to physical therapy for 2 months that did not help as well.  She has tried joint supplement injections in 2021 and states they did not help either.  Pain is aching and mostly localized over the anterior aspect of her knees.  Pain is worse when getting up from a seated position, prolonged walking and standing.  She did have a fall on November 10, 2023.  She fell in her home directly on her bilateral knees.  She states her left knee locked up which caused her to fall.  She has had locking of the left knee since.  She was seen in the emergency room that day and x-rays were taken that were negative for fracture.  Patient is here to review her left knee MRI.    Allergy:  Allergies   Allergen Reactions    Molds & Smuts Nasal Congestion    Bee Pollen Nasal Congestion     Other reaction(s): Nasal Congestion    Pollen Extract Nasal Congestion      Medications:  all current active meds have been reviewed  Past Medical History:  Past Medical History:   Diagnosis Date    Anxiety     Anxiety disorder     Arthritis     At risk for falls     Bipolar 2 disorder (HCC)     Chronic back pain     Chronic kidney disease     Closed fracture of distal end of right fibula with routine healing 2020    COVID-19     in 2021    CVA (cerebral vascular accident) (HCC)     noted on MRI in the past    Depression     GERD (gastroesophageal reflux disease)     Hypercholesteremia     Hypernatremia     Hypertension     Hypokalemia     Idiopathic chronic pancreatitis (HCC) 2018    Intervertebral disc disorder with radiculopathy of lumbosacral region     resolved: 2015    Kidney disease     Kidney disease     Limb alert care status     LUE-fistula    Panic attacks     Pericardial effusion     PONV (postoperative nausea and vomiting)     Psychiatric problem     Radiculitis     resolved: 2015    Secondary renal hyperparathyroidism (HCC)     Stroke ([facility])     Vitamin D deficiency      Past Surgical History:  Past Surgical History:   Procedure Laterality Date    BUNIONECTOMY      Left foot     CAST APPLICATION Right 2022    Procedure: Application short-arm thumb spica splint;  Surgeon: Lyndon Victoria MD;  Location:  MAIN OR;  Service: Orthopedics    COLON SURGERY      COLONOSCOPY  2021    DILATION AND CURETTAGE OF UTERUS      INDUCED       surgically induced    CA ARTERIOVENOUS ANASTOMOSIS OPEN DIRECT Left 2019    Procedure: CREATION FISTULA ARTERIOVENOUS (AV) left wrists possible left upper;  Surgeon: Andrey Quintero MD;  Location:  MAIN OR;  Service: Vascular    CA [facility] METAR OSTEOT Left 2019    Procedure: Serge resendizionectrenee;  Surgeon: Munir Larkin DPM;  Location: [facility] MAIN OR;  Service: Podiatry    [facility]    Procedure: BUNIONEFAVIO HALL;   Surgeon: Munir Larkin DPM;  Location: [facility] MAIN [facility];  Service: Podiatry    CT CORRJ HLX VLGS BNCTY SESMDC PROX PHLX OSTEOT Right 9/27/2021    Procedure: BUNIONECTOMY TAMEKA, right tameka osteotomy and 2nd claw toe correction;  Surgeon: James R Lachman, MD;  Location: [facility] MAIN OR;  Service: Orthopedics    CT ERCP DX COLLECTION SPECIMEN BRUSHING/WASHING N/A 4/11/2018    Procedure: ENDOSCOPIC RETROGRADE CHOLANGIOPANCREATOGRAPHY (ERCP);  Surgeon: Alfredo Messina MD;  Location: [facility] MAIN OR;  Service: Gastroenterology    CT LAPAROSCOPY PROCTOPEXY PROLAPSE N/A 7/13/2018    Procedure: ROBOTIC SIGMOID RESECTION / RECTOPEXY;  Surgeon: ELPIDIO Mcnulty MD;  Location: [facility] MAIN OR;  Service: Colorectal    CT OPEN TREATMENT RADIAL SHAFT FRACTURE Right 10/11/2021    Procedure: OPEN REDUCTION W/ INTERNAL FIXATION (ORIF) RADIUS (WRIST), RIGHT DISTAL;  Surgeon: Riki Ma MD;  Location: [facility];  Service: Orthopedics    CT REMOVAL IMPLANT DEEP Right 6/23/2022    Procedure: Removal of hardware volar aspect right distal radius (distal radial plate and screws);  Surgeon: Lyndon Victoria MD;  Location: [facility];  Service: Orthopedics    CT SIGMOIDOSCOPY FLX DX W/COLLJ SPEC BR/WA IF PFRMD N/A 7/13/2018    Procedure: SIGMOIDOSCOPY FLEXIBLE;  Surgeon: ELPIDIO Mcnulty MD;  Location: [facility]IN [facility];  Service: Colorectal    CT TR TDN RESTORE INTRNSC FUNCJ ALL 4 FNGRS Right 6/23/2022    Procedure: Right ring finger flexor digitorum superficialis to flexor pollicis longus tendon transfer;  Surgeon: Lyndon Victoria MD;  Location: [facility];  Service: Orthopedics    TUBAL LIGATION Bilateral 1997    US GUIDED THYROID BIOPSY  7/30/2019     Family History:  Family History   Problem Relation Age of Onset    Bipolar disorder Mother     Mental illness Mother         depression    Stroke Mother     Dementia Mother     Colon polyps Mother     Heart disease Father     Hypertension Father     Diabetes Father     Other Family         Back disorder  "   Diabetes Family     Heart disease Family     Hypertension Family     Stroke Family     Thyroid disease Family     Breast cancer Paternal Grandmother         age unknown    Breast cancer Paternal Aunt         age unknown    Breast cancer Maternal Aunt         age unknown    Mental illness Sister     Colon polyps Sister     Mental illness Sister     Heart disease Sister     No Known Problems Sister     Breast cancer Sister 68    Other Son         pituitary tumor    Hypertension Son     Obesity Son     No Known Problems Son     No Known Problems Maternal Grandmother     No Known Problems Maternal Grandfather     No Known Problems Paternal Grandfather     Breast cancer Paternal Aunt         age unknown    Substance Abuse Neg Hx         neg fam hx    Colon cancer Neg Hx      Social History:  Social History     Substance and Sexual Activity   Alcohol Use Not Currently     Social History     Substance and Sexual Activity   Drug Use Yes    Types: Hydrocodone, Marijuana    Comment: MEDICATION   Ayleen has h/o marijuana abuse- not currently     Social History     Tobacco Use   Smoking Status Never   Smokeless Tobacco Never     Review of Systems   Constitutional: Negative.    HENT: Negative.     Eyes: Negative.    Respiratory: Negative.     Cardiovascular: Negative.    Gastrointestinal: Negative.    Endocrine: Negative.    Genitourinary: Negative.    Musculoskeletal:  Positive for arthralgias (Bilateral knee), gait problem (Antalgic) and joint swelling (Bilateral knee).   Skin: Negative.    Allergic/Immunologic: Negative.    Hematological: Negative.    Psychiatric/Behavioral: Negative.           Objective:  BP Readings from Last 1 Encounters:   01/18/24 138/78      Wt Readings from Last 1 Encounters:   01/18/24 96.2 kg (212 lb)      BMI:   Estimated body mass index is 36.39 kg/m² as calculated from the following:    Height as of this encounter: 5' 4\" (1.626 m).    Weight as of this encounter: 96.2 kg (212 lb).  BSA: " "  Estimated body surface area is 2.01 meters squared as calculated from the following:    Height as of this encounter: 5' 4\" (1.626 m).    Weight as of this encounter: 96.2 kg (212 lb).   Physical Exam  Vitals and nursing note reviewed.   Constitutional:       Appearance: Normal appearance. She is well-developed.   HENT:      Head: Normocephalic and atraumatic.      Right Ear: External ear normal.      Left Ear: External ear normal.   Eyes:      Extraocular Movements: Extraocular movements intact.      Conjunctiva/sclera: Conjunctivae normal.      Pupils: Pupils are equal, round, and reactive to light.   Cardiovascular:      Rate and Rhythm: Regular rhythm.      Heart sounds: Murmur heard.      No gallop.   Pulmonary:      Effort: Pulmonary effort is normal.      Breath sounds: Normal breath sounds.   Abdominal:      Palpations: Abdomen is soft.   Musculoskeletal:      Cervical back: Normal range of motion and neck supple.      Left knee: No effusion.      Instability Tests: Lateral Kateryna test positive. Medial Kateryan test negative.   Skin:     General: Skin is warm and dry.   Neurological:      Mental Status: She is alert and oriented to person, place, and time.      Deep Tendon Reflexes: Reflexes are normal and symmetric.   Psychiatric:         Mood and Affect: Mood normal.         Behavior: Behavior normal.       Left Knee Exam     Tenderness   The patient is experiencing tenderness in the lateral joint line and patella.    Range of Motion   Extension:  0   Flexion:  120     Tests   Kateryna:  Medial - negative Lateral - positive  Varus: negative Valgus: negative  Patellar apprehension: negative    Other   Erythema: absent  Scars: absent  Sensation: normal  Pulse: present  Swelling: mild  Effusion: no effusion present            I have personally reviewed pertinent films in PACS and my interpretation is left knee MRI show moderate osteoarthritis in tricompartments.  Degenerative medial meniscus tear and " horizontal lateral meniscus tear.

## 2024-01-18 NOTE — BH CRISIS PLAN
" [facility]      Client Name: Ayleen Moralez       Client YOB: 1960     CRISIS PLAN Creation Date: 01/18/24 and CRISIS PLAN Review Date : 1 year    ____________________________________________________________________________________________________________  [facility] SAFETY PLAN         Step 1: Warning Signs:   Ayleen Moralez stated:  Depression  Thoughts people are talking about her  Racing Thoughts  Talking A lot   Forgetfulness of what to say  Quiet   Lacking Socializing  Agitated          Step 2: Internal Coping Strategies:   Breathing techniques  Quiet Environment          Step 3: People and social settings that provide distraction:               Names: Don't want to talk to people she live with                                                                                                                                                             Names                                                                                      Place:     Shopping                                                   Place:  [facility]      Step 4: People whom I can ask for help during a crisis:       Danielito Starks Juanleroylarissa( Ayleen son)                    has access to son's number in her cell phone                Tito Moralez(Ayleen son)                      \"       ''        ''     ''         ''         \"  \"      \"      \"                  Sherie Greenfield ( Ayleen sister)                                      has access to number at home in her phone                                                                       Step 5: Professionals or agencies I can contact during a crisis:   [facility]                    Number: [phone]             Local Emergency Department:                               Local Emergency Number    [facility]                                  "                               Also [facility]                                        Does not know phone number        Crisis Phone Numbers:   Suicide Prevention Lifeline: Call 9828-512-QAOR or Text  768 Crisis Text Line: Text HOME to 622-312   Please note: Some [facility] do not have a separate number for Child/Adolescent specific crisis. If your county is not listed under Child/Adolescent, please call the adult number for your county      Adult Crisis Numbers: Child/Adolescent Crisis Numbers   [facility]: [phone] [facility]: [phone]   [facility]: [phone] [facility]: [phone]   [facility]: [phone] [place]: [phone]   [facility]: [phone] [facility]: [phone]   [facility]: [phone]   [facility]: [phone]   [facility]: [phone]   [facility]: [phone] (daytime) [phone] (after hours, weekends, holidays)      Step 6: Making the environment safer (plan for lethal means safety):   Ayleen Moralez has knives in the kitchen counter- reported reduction of access is avoidance of area               Optional: What is most important to me and worth living for?  Ayleen  you stated your two Sons and Dog      Jennifer Safety Plan. Kaitlin Tabares and Alf Solomon. Used with permission of the authors.       CRISIS plan was formulated via my Physician/ Psychiatrist and I ( patient ) Ayleen Moralez

## 2024-01-18 NOTE — PROGRESS NOTES
Assessment:     1. Primary osteoarthritis of left knee    2. Preop testing        Plan:     Problem List Items Addressed This Visit          Musculoskeletal and Integument    Primary osteoarthritis of left knee - Primary    Relevant Medications    ascorbic acid (VITAMIN C) 500 MG tablet    ferrous sulfate 324 (65 Fe) mg    folic acid (FOLVITE) 1 mg tablet    Multiple Vitamin (MULTI-VITAMIN) tablet    Other Relevant Orders    Comprehensive metabolic panel    Hemoglobin A1C W/EAG Estimation    CBC and differential    Anemia Panel w/Reflex    Protime-INR    APTT    Type and screen    Ambulatory referral to Family Practice    Ambulatory referral to Physical Therapy    Case request operating room: ARTHROPLASTY KNEE TOTAL SAME DAY (Completed)    EKG 12 lead    XR chest pa & lateral     Other Visit Diagnoses       Preop testing        Relevant Medications    ascorbic acid (VITAMIN C) 500 MG tablet    ferrous sulfate 324 (65 Fe) mg    folic acid (FOLVITE) 1 mg tablet    Multiple Vitamin (MULTI-VITAMIN) tablet    Other Relevant Orders    Comprehensive metabolic panel    Hemoglobin A1C W/EAG Estimation    CBC and differential    Anemia Panel w/Reflex    Protime-INR    APTT    Type and screen    Ambulatory referral to Family Practice    Ambulatory referral to Physical Therapy    EKG 12 lead    XR chest pa & lateral          Findings today are consistent with moderate left knee osteoarthritis with degenerative medial and lateral meniscus tear.  Findings and treatment options were discussed with the patient.  MRI were reviewed with her.  Patient is symptom is mostly related to her osteoarthritis.  She has minimal mechanical symptoms.  She continued to have issue with falling due to pain.  I had a long discussion with the patient with regard to treating her knee condition.  I am concerned that with arthroscopy surgery she might not recover completely due to her diffuse moderate arthritis in the knee.  I discussed with her knee  replacement might be a better option for long-term pain relief.  Patient has tried injection in the past which did not provide her with long-term pain relief.  Patient elected to proceed with left total knee replacement.  All patient's questions were answered to her satisfaction.  This note is created using dictation transcription.  It may contain typographical errors, grammatical errors, improperly dictated words, background noise and other errors.    Discussed with patient surgical risks and complications including but not limited to infection, persistent pain, nerve and vessel injury, complications associated with anesthesia, DVT, exposure to COVID virus, etc.  Patient understands the risks and complication and consented to the surgery.    Subjective:     Patient ID: Ayleen Moralez is a 63 y.o. female.  Chief Complaint:  63 year old female patient presents today for a follow up of bilateral knee osteoarthritis, left more symptomatic than right.  Last seen 7/26/2023 for CSI.  She states she only had 1 week of relief with the last cortisone injections.  She did go to physical therapy for 2 months that did not help as well.  She has tried joint supplement injections in 2021 and states they did not help either.  Pain is aching and mostly localized over the anterior aspect of her knees.  Pain is worse when getting up from a seated position, prolonged walking and standing.  She did have a fall on November 10, 2023.  She fell in her home directly on her bilateral knees.  She states her left knee locked up which caused her to fall.  She has had locking of the left knee since.  She was seen in the emergency room that day and x-rays were taken that were negative for fracture.  Patient is here to review her left knee MRI.    Allergy:  Allergies   Allergen Reactions    Molds & Smuts Nasal Congestion    Bee Pollen Nasal Congestion     Other reaction(s): Nasal Congestion    Pollen Extract Nasal Congestion      Medications:  all current active meds have been reviewed  Past Medical History:  Past Medical History:   Diagnosis Date    Anxiety     Anxiety disorder     Arthritis     At risk for falls     Bipolar 2 disorder (HCC)     Chronic back pain     Chronic kidney disease     Closed fracture of distal end of right fibula with routine healing 2020    COVID-19     in 2021    CVA (cerebral vascular accident) (HCC)     noted on MRI in the past    Depression     GERD (gastroesophageal reflux disease)     Hypercholesteremia     Hypernatremia     Hypertension     Hypokalemia     Idiopathic chronic pancreatitis (HCC) 2018    Intervertebral disc disorder with radiculopathy of lumbosacral region     resolved: 2015    Kidney disease     Kidney disease     Limb alert care status     LUE-fistula    Panic attacks     Pericardial effusion     PONV (postoperative nausea and vomiting)     Psychiatric problem     Radiculitis     resolved: 2015    Secondary renal hyperparathyroidism (HCC)     Stroke ([facility])     Vitamin D deficiency      Past Surgical History:  Past Surgical History:   Procedure Laterality Date    BUNIONECTOMY      Left foot     CAST APPLICATION Right 2022    Procedure: Application short-arm thumb spica splint;  Surgeon: Lyndon Victoria MD;  Location:  MAIN OR;  Service: Orthopedics    COLON SURGERY      COLONOSCOPY  2021    DILATION AND CURETTAGE OF UTERUS      INDUCED       surgically induced    LA ARTERIOVENOUS ANASTOMOSIS OPEN DIRECT Left 2019    Procedure: CREATION FISTULA ARTERIOVENOUS (AV) left wrists possible left upper;  Surgeon: Andrey Quintero MD;  Location:  MAIN OR;  Service: Vascular    LA [facility] METAR OSTEOT Left 2019    Procedure: Serge resendizionectrenee;  Surgeon: Munir Larkin DPM;  Location: [facility] MAIN OR;  Service: Podiatry    [facility] 8/3/2020    Procedure: BUNIONEFAVIO HALL;   Surgeon: Munir Larkin DPM;  Location: [facility];  Service: Podiatry    MO CORRJ HLX VLGS BNCTY SESMDC PROX PHLX OSTEOT Right 9/27/2021    Procedure: BUNIONECTOMY TAMEKA, right tameka osteotomy and 2nd claw toe correction;  Surgeon: James R Lachman, MD;  Location: [facility];  Service: Orthopedics    MO ERCP DX COLLECTION SPECIMEN BRUSHING/WASHING N/A 4/11/2018    Procedure: ENDOSCOPIC RETROGRADE CHOLANGIOPANCREATOGRAPHY (ERCP);  Surgeon: Alfredo Messina MD;  Location: [facility];  Service: Gastroenterology    MO LAPAROSCOPY PROCTOPEXY PROLAPSE N/A 7/13/2018    Procedure: ROBOTIC SIGMOID RESECTION / RECTOPEXY;  Surgeon: ELPIDIO Mcnulty MD;  Location: [facility] MAIN OR;  Service: Colorectal    MO OPEN TREATMENT RADIAL SHAFT FRACTURE Right 10/11/2021    Procedure: OPEN REDUCTION W/ INTERNAL FIXATION (ORIF) RADIUS (WRIST), RIGHT DISTAL;  Surgeon: Riki Ma MD;  Location: [facility];  Service: Orthopedics    MO REMOVAL IMPLANT DEEP Right 6/23/2022    Procedure: Removal of hardware volar aspect right distal radius (distal radial plate and screws);  Surgeon: Lyndon Victoria MD;  Location: [facility];  Service: Orthopedics    MO SIGMOIDOSCOPY FLX DX W/COLLJ SPEC BR/WA IF PFRMD N/A 7/13/2018    Procedure: SIGMOIDOSCOPY FLEXIBLE;  Surgeon: ELPIDIO Mcnulty MD;  Location: [facility]IN OR;  Service: Colorectal    MO TR TDN RESTORE INTRNSC FUNCJ ALL 4 FNGRS Right 6/23/2022    Procedure: Right ring finger flexor digitorum superficialis to flexor pollicis longus tendon transfer;  Surgeon: Lyndon Victoria MD;  Location: [facility];  Service: Orthopedics    TUBAL LIGATION Bilateral 1997    US GUIDED THYROID BIOPSY  7/30/2019     Family History:  Family History   Problem Relation Age of Onset    Bipolar disorder Mother     Mental illness Mother         depression    Stroke Mother     Dementia Mother     Colon polyps Mother     Heart disease Father     Hypertension Father     Diabetes Father     Other Family         Back disorder  "   Diabetes Family     Heart disease Family     Hypertension Family     Stroke Family     Thyroid disease Family     Breast cancer Paternal Grandmother         age unknown    Breast cancer Paternal Aunt         age unknown    Breast cancer Maternal Aunt         age unknown    Mental illness Sister     Colon polyps Sister     Mental illness Sister     Heart disease Sister     No Known Problems Sister     Breast cancer Sister 68    Other Son         pituitary tumor    Hypertension Son     Obesity Son     No Known Problems Son     No Known Problems Maternal Grandmother     No Known Problems Maternal Grandfather     No Known Problems Paternal Grandfather     Breast cancer Paternal Aunt         age unknown    Substance Abuse Neg Hx         neg fam hx    Colon cancer Neg Hx      Social History:  Social History     Substance and Sexual Activity   Alcohol Use Not Currently     Social History     Substance and Sexual Activity   Drug Use Yes    Types: Hydrocodone, Marijuana    Comment: MEDICATION   Ayleen has h/o marijuana abuse- not currently     Social History     Tobacco Use   Smoking Status Never   Smokeless Tobacco Never     Review of Systems   Constitutional: Negative.    HENT: Negative.     Eyes: Negative.    Respiratory: Negative.     Cardiovascular: Negative.    Gastrointestinal: Negative.    Endocrine: Negative.    Genitourinary: Negative.    Musculoskeletal:  Positive for arthralgias (Bilateral knee), gait problem (Antalgic) and joint swelling (Bilateral knee).   Skin: Negative.    Allergic/Immunologic: Negative.    Hematological: Negative.    Psychiatric/Behavioral: Negative.           Objective:  BP Readings from Last 1 Encounters:   01/18/24 138/78      Wt Readings from Last 1 Encounters:   01/18/24 96.2 kg (212 lb)      BMI:   Estimated body mass index is 36.39 kg/m² as calculated from the following:    Height as of this encounter: 5' 4\" (1.626 m).    Weight as of this encounter: 96.2 kg (212 lb).  BSA: " "  Estimated body surface area is 2.01 meters squared as calculated from the following:    Height as of this encounter: 5' 4\" (1.626 m).    Weight as of this encounter: 96.2 kg (212 lb).   Physical Exam  Vitals and nursing note reviewed.   Constitutional:       Appearance: Normal appearance. She is well-developed.   HENT:      Head: Normocephalic and atraumatic.      Right Ear: External ear normal.      Left Ear: External ear normal.   Eyes:      Extraocular Movements: Extraocular movements intact.      Conjunctiva/sclera: Conjunctivae normal.      Pupils: Pupils are equal, round, and reactive to light.   Cardiovascular:      Rate and Rhythm: Regular rhythm.      Heart sounds: Murmur heard.      No gallop.   Pulmonary:      Effort: Pulmonary effort is normal.      Breath sounds: Normal breath sounds.   Abdominal:      Palpations: Abdomen is soft.   Musculoskeletal:      Cervical back: Normal range of motion and neck supple.      Left knee: No effusion.      Instability Tests: Lateral Kateryna test positive. Medial Kateryna test negative.   Skin:     General: Skin is warm and dry.   Neurological:      Mental Status: She is alert and oriented to person, place, and time.      Deep Tendon Reflexes: Reflexes are normal and symmetric.   Psychiatric:         Mood and Affect: Mood normal.         Behavior: Behavior normal.       Left Knee Exam     Tenderness   The patient is experiencing tenderness in the lateral joint line and patella.    Range of Motion   Extension:  0   Flexion:  120     Tests   Kateryna:  Medial - negative Lateral - positive  Varus: negative Valgus: negative  Patellar apprehension: negative    Other   Erythema: absent  Scars: absent  Sensation: normal  Pulse: present  Swelling: mild  Effusion: no effusion present            I have personally reviewed pertinent films in PACS and my interpretation is left knee MRI show moderate osteoarthritis in tricompartments.  Degenerative medial meniscus tear and " horizontal lateral meniscus tear.

## 2024-01-19 ENCOUNTER — TELEPHONE (OUTPATIENT)
Age: 64
End: 2024-01-19

## 2024-01-19 NOTE — TELEPHONE ENCOUNTER
Preoperative Elective Admission Assessment- spoke to patient     Living Situation:    Who does pt live with: relative, Sister   What kind of home: multi-level  How do they enter the home: front  How many levels in home: 2 levels    # of steps to enter home: 1 to enter   # of steps to second floor: 13 to 2nd floor   Are there handrails: Yes  Are there landings: No  Sleeping arrangement: first/entry floor  Where is Bathroom: First floor bathroom with 1/2 bath   Where is the tub or shower: Second floor bath with step in tub with bars and seat      First Floor Setup:   Is there a bathroom: Yes  Where would pt sleep: couch     DME: rolling walker    We discussed clearing pathways in the home and making sure there is accessibly to use the walker, for example, removing throw rugs.      Patient's Current Level of Function: Ambulates with walker and ADLs: Independent    Post-op Caregiver: relative, Sister   Currently receive any HHC/aides/community supports: No     Post-op Transport: relative  To/from hospital: relative  To/from PT 2-3x/week: relative  Uses community transport now: No     Outpatient Physical Therapy Site:  Site: [facility]   pre and post-op appts scheduled? No     Medication Management: self  Preferred Pharmacy for Post-op Medications: [id number] - [place] - [address] [[id number]]   Blood Management Vitamin Regimen:  Patient getting this weekend   Post-op anticoagulant: to be determined by surgical team postoperatively     DC Plan: Pt plans to be discharged home    Barriers to DC identified preoperatively: none identified    BMI: 36.39      Patient Education:  Pt educated on post-op pain, early mobilization (POD0), LOS goals, OP PT goals, and preoperative bathing. Patient educated that our goal is to appropriately discharge patient based off their post-op function while striving to maintain maximal independence. The goal is to discharge patient to home and for them to attend  outpatient physical therapy.    Assigned to care team? Yes

## 2024-01-19 NOTE — TELEPHONE ENCOUNTER
Caller: Patient     Doctor: Francesca     Reason for call: Asked about vitamins, call was warm transferred to nurse     Call back#: [phone]

## 2024-01-19 NOTE — TELEPHONE ENCOUNTER
Caller: Ayleen    Doctor: Francesca    Reason for call: Patient calling regarding the vitamins she should

## 2024-01-19 NOTE — TELEPHONE ENCOUNTER
Caller: Patient    Doctor: Francesca    Reason for call: Patient called regarding surgery that has been scheduled.  Patient would like to know if it would be possible to, instead of replacing the whole knee, to 'clean out' what's there.    Please call patient back to advise.    Call back#: [phone]

## 2024-01-19 NOTE — TELEPHONE ENCOUNTER
Caller: Patient    Doctor: Francesca    Reason for call: Patient stated she had been talking to her brother who mentioned perhaps she shouldn't have replacement surgery. Patient called to state she shouldn't have listened to her brother and wants to proceed replacement surgery with Dr Ma. Patient stated she doesn't need a call back to discuss any other surgery options    Call back#: [phone]

## 2024-01-22 ENCOUNTER — TELEPHONE (OUTPATIENT)
Dept: OBGYN CLINIC | Facility: HOSPITAL | Age: 64
End: 2024-01-22

## 2024-01-23 ENCOUNTER — APPOINTMENT (OUTPATIENT)
Dept: LAB | Facility: HOSPITAL | Age: 64
DRG: 469 | End: 2024-01-23
Payer: MEDICARE

## 2024-01-23 ENCOUNTER — HOSPITAL ENCOUNTER (OUTPATIENT)
Dept: RADIOLOGY | Facility: HOSPITAL | Age: 64
Discharge: HOME | End: 2024-01-23
Payer: MEDICARE

## 2024-01-23 ENCOUNTER — APPOINTMENT (OUTPATIENT)
Dept: LAB | Facility: HOSPITAL | Age: 64
End: 2024-01-23
Payer: MEDICARE

## 2024-01-23 DIAGNOSIS — Z01.818 PREOP TESTING: ICD-10-CM

## 2024-01-23 DIAGNOSIS — M17.12 PRIMARY OSTEOARTHRITIS OF LEFT KNEE: ICD-10-CM

## 2024-01-23 DIAGNOSIS — Z01.818 OTHER SPECIFIED PRE-OPERATIVE EXAMINATION: ICD-10-CM

## 2024-01-23 LAB
ABO GROUP BLD: NORMAL
ALBUMIN SERPL BCP-MCNC: 4.3 G/DL (ref 3.5–5)
ALP SERPL-CCNC: 242 U/L (ref 34–104)
ALT SERPL W P-5'-P-CCNC: 13 U/L (ref 7–52)
ANION GAP SERPL CALCULATED.3IONS-SCNC: 9 MMOL/L
APTT PPP: 30 SECONDS (ref 23–37)
AST SERPL W P-5'-P-CCNC: 16 U/L (ref 13–39)
ATRIAL RATE: 91 BPM
BASOPHILS # BLD AUTO: 0.03 THOUSANDS/ÂΜL (ref 0–0.1)
BASOPHILS NFR BLD AUTO: 1 % (ref 0–1)
BILIRUB SERPL-MCNC: 0.27 MG/DL (ref 0.2–1)
BLD GP AB SCN SERPL QL: NEGATIVE
BUN SERPL-MCNC: 39 MG/DL (ref 5–25)
CALCIUM SERPL-MCNC: 10 MG/DL (ref 8.4–10.2)
CHLORIDE SERPL-SCNC: 110 MMOL/L (ref 96–108)
CO2 SERPL-SCNC: 24 MMOL/L (ref 21–32)
CREAT SERPL-MCNC: 2.62 MG/DL (ref 0.6–1.3)
EOSINOPHIL # BLD AUTO: 0.01 THOUSAND/ÂΜL (ref 0–0.61)
EOSINOPHIL NFR BLD AUTO: 0 % (ref 0–6)
ERYTHROCYTE [DISTWIDTH] IN BLOOD BY AUTOMATED COUNT: 12.8 % (ref 11.6–15.1)
EST. AVERAGE GLUCOSE BLD GHB EST-MCNC: 143 MG/DL
FERRITIN SERPL-MCNC: 72 NG/ML (ref 11–307)
GFR SERPL CREATININE-BSD FRML MDRD: 19 ML/MIN/1.73SQ M
GLUCOSE P FAST SERPL-MCNC: 129 MG/DL (ref 65–99)
HBA1C MFR BLD: 6.6 %
HCT VFR BLD AUTO: 38.5 % (ref 34.8–46.1)
HGB BLD-MCNC: 11.7 G/DL (ref 11.5–15.4)
IMM GRANULOCYTES # BLD AUTO: 0.03 THOUSAND/UL (ref 0–0.2)
IMM GRANULOCYTES NFR BLD AUTO: 1 % (ref 0–2)
INR PPP: 0.95 (ref 0.84–1.19)
IRON SATN MFR SERPL: 17 % (ref 15–50)
IRON SERPL-MCNC: 50 UG/DL (ref 50–212)
LYMPHOCYTES # BLD AUTO: 1.18 THOUSANDS/ÂΜL (ref 0.6–4.47)
LYMPHOCYTES NFR BLD AUTO: 19 % (ref 14–44)
MCH RBC QN AUTO: 31.3 PG (ref 26.8–34.3)
MCHC RBC AUTO-ENTMCNC: 30.4 G/DL (ref 31.4–37.4)
MCV RBC AUTO: 103 FL (ref 82–98)
MONOCYTES # BLD AUTO: 0.51 THOUSAND/ÂΜL (ref 0.17–1.22)
MONOCYTES NFR BLD AUTO: 8 % (ref 4–12)
NEUTROPHILS # BLD AUTO: 4.41 THOUSANDS/ÂΜL (ref 1.85–7.62)
NEUTS SEG NFR BLD AUTO: 71 % (ref 43–75)
NRBC BLD AUTO-RTO: 0 /100 WBCS
P AXIS: 22 DEGREES
PLATELET # BLD AUTO: 234 THOUSANDS/UL (ref 149–390)
PMV BLD AUTO: 10.8 FL (ref 8.9–12.7)
POTASSIUM SERPL-SCNC: 4.6 MMOL/L (ref 3.5–5.3)
PR INTERVAL: 144 MS
PROT SERPL-MCNC: 7.3 G/DL (ref 6.4–8.4)
PROTHROMBIN TIME: 13.1 SECONDS (ref 11.6–14.5)
QRS AXIS: 221 DEGREES
QRSD INTERVAL: 126 MS
QT INTERVAL: 424 MS
QTC INTERVAL: 521 MS
RBC # BLD AUTO: 3.74 MILLION/UL (ref 3.81–5.12)
RETICS # AUTO: NORMAL 10*3/UL (ref 14097–95744)
RETICS # CALC: 1.61 % (ref 0.37–1.87)
RH BLD: POSITIVE
SODIUM SERPL-SCNC: 143 MMOL/L (ref 135–147)
SPECIMEN EXPIRATION DATE: NORMAL
T WAVE AXIS: 10 DEGREES
TIBC SERPL-MCNC: 297 UG/DL (ref 250–450)
UIBC SERPL-MCNC: 247 UG/DL (ref 155–355)
VENTRICULAR RATE: 91 BPM
WBC # BLD AUTO: 6.17 THOUSAND/UL (ref 4.31–10.16)

## 2024-01-23 PROCEDURE — 86850 RBC ANTIBODY SCREEN: CPT

## 2024-01-23 PROCEDURE — 85045 AUTOMATED RETICULOCYTE COUNT: CPT

## 2024-01-23 PROCEDURE — 85025 COMPLETE CBC W/AUTO DIFF WBC: CPT

## 2024-01-23 PROCEDURE — 85730 THROMBOPLASTIN TIME PARTIAL: CPT

## 2024-01-23 PROCEDURE — 36415 COLL VENOUS BLD VENIPUNCTURE: CPT

## 2024-01-23 PROCEDURE — 80053 COMPREHEN METABOLIC PANEL: CPT

## 2024-01-23 PROCEDURE — 85610 PROTHROMBIN TIME: CPT

## 2024-01-23 PROCEDURE — 86901 BLOOD TYPING SEROLOGIC RH(D): CPT

## 2024-01-23 PROCEDURE — 86900 BLOOD TYPING SEROLOGIC ABO: CPT

## 2024-01-23 PROCEDURE — 83550 IRON BINDING TEST: CPT

## 2024-01-23 PROCEDURE — 71046 X-RAY EXAM CHEST 2 VIEWS: CPT

## 2024-01-23 PROCEDURE — 83540 ASSAY OF IRON: CPT

## 2024-01-23 PROCEDURE — 82728 ASSAY OF FERRITIN: CPT

## 2024-01-23 PROCEDURE — 83036 HEMOGLOBIN GLYCOSYLATED A1C: CPT

## 2024-01-24 ENCOUNTER — TELEPHONE (OUTPATIENT)
Age: 64
End: 2024-01-24

## 2024-01-24 ENCOUNTER — SOCIAL WORK (OUTPATIENT)
Dept: BEHAVIORAL/MENTAL HEALTH CLINIC | Facility: CLINIC | Age: 64
End: 2024-01-24
Payer: MEDICARE

## 2024-01-24 DIAGNOSIS — F40.01 PANIC DISORDER WITH AGORAPHOBIA: ICD-10-CM

## 2024-01-24 DIAGNOSIS — F39 MOOD DISORDER (HCC): Primary | ICD-10-CM

## 2024-01-24 DIAGNOSIS — F42.2 MIXED OBSESSIONAL THOUGHTS AND ACTS: ICD-10-CM

## 2024-01-24 LAB
DME PARACHUTE DELIVERY DATE REQUESTED: NORMAL
DME PARACHUTE ITEM DESCRIPTION: NORMAL
DME PARACHUTE ORDER STATUS: NORMAL
DME PARACHUTE SUPPLIER NAME: NORMAL
DME PARACHUTE SUPPLIER PHONE: NORMAL

## 2024-01-24 PROCEDURE — 90834 PSYTX W PT 45 MINUTES: CPT | Performed by: COUNSELOR

## 2024-01-24 NOTE — PRE-PROCEDURE INSTRUCTIONS
Pre-Surgery Instructions:   Medication Instructions    acetaminophen (TYLENOL) 650 mg CR tablet Uses PRN- OK to take day of surgery    albuterol (Ventolin HFA) 90 mcg/act inhaler Uses PRN- OK to take day of surgery. Bring the day of surgery    alendronate (Fosamax) 70 mg tablet Pt takes weekly    AMILoride 5 mg tablet Hold day of surgery.    amLODIPine (NORVASC) 5 mg tablet Take night before surgery    ascorbic acid (VITAMIN C) 500 MG tablet Hold day of surgery.    budesonide-formoterol (Symbicort) 160-4.5 mcg/act inhaler Uses PRN- OK to take day of surgery    carvedilol (COREG) 25 mg tablet Take day of surgery.    clonazePAM (KlonoPIN) 0.5 mg tablet Take day of surgery.    Cyanocobalamin (VITAMIN B 12 PO) Hold day of surgery.    ferrous sulfate 324 (65 Fe) mg Hold day of surgery.    fluvoxaMINE (LUVOX) 100 mg tablet Take day of surgery.    folic acid (FOLVITE) 1 mg tablet Hold day of surgery.    lamoTRIgine (LaMICtal) 200 MG tablet Take day of surgery.    Multiple Vitamin (MULTI-VITAMIN) tablet Hold day of surgery.    omeprazole (PriLOSEC) 40 MG capsule Take day of surgery.    ondansetron (ZOFRAN) 4 mg tablet Uses PRN- OK to take day of surgery    QUEtiapine (SEROquel) 300 mg tablet Take day of surgery.    QUEtiapine (SEROquel) 400 MG tablet Take night before surgery    rosuvastatin (CRESTOR) 20 MG tablet Take day of surgery.    traMADol (Ultram) 50 mg tablet Uses PRN- OK to take day of surgery   Medication instructions for day surgery reviewed. Please use only a sip of water to take your instructed medications. Avoid all over the counter vitamins, supplements and NSAIDS for one week prior to surgery per anesthesia guidelines. Tylenol is ok to take as needed.     You will receive a call one business day prior to surgery with an arrival time and hospital directions. If your surgery is scheduled on a Monday, the hospital will be calling you on the Friday prior to your surgery. If you have not heard from anyone by  8pm, please call the hospital supervisor through the hospital  at [phone]. (Emil [phone]).    Do not eat or drink anything after midnight the night before your surgery, including candy, mints, lifesavers, or chewing gum. Do not drink alcohol 24hrs before your surgery. Try not to smoke at least 24hrs before your surgery.       Follow the pre surgery showering instructions as listed in the “My Surgical Experience Booklet” or otherwise provided by your surgeon's office. Do not use a blade to shave the surgical area 1 week before surgery. It is okay to use a clean electric clippers up to 24 hours before surgery. Do not apply any lotions, creams, including makeup, cologne, deodorant, or perfumes after showering on the day of your surgery. Do not use dry shampoo, hair spray, hair gel, or any type of hair products.     No contact lenses, eye make-up, or artificial eyelashes. Remove nail polish, including gel polish, and any artificial, gel, or acrylic nails if possible. Remove all jewelry including rings and body piercing jewelry.     Wear causal clothing that is easy to take on and off. Consider your type of surgery.    Keep any valuables, jewelry, piercings at home. Please bring any specially ordered equipment (sling, braces) if indicated.    Arrange for a responsible person to drive you to and from the hospital on the day of your surgery. Visitor Guidelines discussed.     Call the surgeon's office with any new illnesses, exposures, or additional questions prior to surgery.    Please reference your “My Surgical Experience Booklet” for additional information to prepare for your upcoming surgery.

## 2024-01-24 NOTE — PSYCH
"Behavioral Health Psychotherapy Progress Note    Psychotherapy Provided: Individual Psychotherapy     1. Mood disorder (HCC)        2. Mixed obsessional thoughts and acts        3. Panic disorder with agoraphobia            Goals addressed in session: Goal 1     DATA:  Ayleen  talked about the holidays and how she misses her 2 grown sons.  One son has not talked to her in years and she said \"the other one will only communicate with me if he needs something.\"  She has 2 grandkids from this son and said \"they never include me in anything.\"  She was tearful talking about this.  Ayleen also shared that she misses her dog who  last year.  She lives with her sister and her niece and said they have 2 dogs and she can't get one due to this and finances.  She said \"I just wants something or someone to love me.\"  Ayleen shared that she will be getting a left knee replacement on 24 and how she hopes her sister and niece are supportive of her as she typically feels that they are both against her in some way.  Ayleen said it was helpful for her to share all these things today in session. When she was tearful, she said \"I can't do this at home; they tell me just to get over what I'm feeling.\"  During this session, this clinician used the following therapeutic modalities: Supportive Psychotherapy    Substance Abuse was not addressed during this session. If the client is diagnosed with a co-occurring substance use disorder, please indicate any changes in the frequency or amount of use: N/A. Stage of change for addressing substance use diagnoses: No substance use/Not applicable    ASSESSMENT:  Ayleen Moralez presents with a dysthymic mood and tearful at times.     her affect is Normal range and intensity, which is congruent, with her mood and the content of the session. The client has not made progress on their goals.    Ayleen Moralez presents with a none risk of suicide, none risk of self-harm, and none risk of " "harm to others.    For any risk assessment that surpasses a \"low\" rating, a safety plan must be developed.    A safety plan was indicated: no  If yes, describe in detail N/A    PLAN: Between sessions, Ayleen Moralez will practice good self care and just focus on getting through her upcoming surgery. At the next session, the therapist will use Supportive Psychotherapy to address goals/needs/concerns.    Behavioral Health Treatment Plan and Discharge Planning: Ayleen Moralez is aware of and agrees to continue to work on their treatment plan. They have identified and are working toward their discharge goals. yes    Visit start and stop times:    01/24/24  Start Time: 1100  Stop Time: 1150  Total Visit Time: 50 minutes  "

## 2024-01-24 NOTE — TELEPHONE ENCOUNTER
Caller: Self    Doctor: Francesca    Reason for call: Patient wants to know if there was a way to get a hospital bed to use at home while she recovered that would be covered by insurance. Is there someone can provide her with information or help coordinate this?    Call back#: [phone]

## 2024-01-25 ENCOUNTER — ANESTHESIA EVENT (OUTPATIENT)
Dept: PERIOP | Facility: HOSPITAL | Age: 64
DRG: 469 | End: 2024-01-25
Payer: MEDICARE

## 2024-01-25 ENCOUNTER — TELEPHONE (OUTPATIENT)
Dept: NEPHROLOGY | Facility: CLINIC | Age: 64
End: 2024-01-25

## 2024-01-25 NOTE — TELEPHONE ENCOUNTER
Received a call from Yissel at  ortho [phone] requesting a clearance for the pt to have a total knee replacement. Pt is scheduled for surgery on 2/5/24. Does this pt need an in-person follow up to be cleared, or can it be done through labs? If she is able to be cleared, ortho is requesting a letter of clearance to be added to the pt's chart. We currently have no openings with Dr Mills or CEDRIC's prior to surgery. Please advise.

## 2024-01-26 DIAGNOSIS — M79.605 BILATERAL LEG PAIN: ICD-10-CM

## 2024-01-26 DIAGNOSIS — M79.604 BILATERAL LEG PAIN: ICD-10-CM

## 2024-01-26 DIAGNOSIS — M54.50 LUMBAR PAIN: ICD-10-CM

## 2024-01-26 RX ORDER — TRAMADOL HYDROCHLORIDE 50 MG/1
50 TABLET ORAL EVERY 12 HOURS PRN
Qty: 60 TABLET | Refills: 0 | Status: SHIPPED | OUTPATIENT
Start: 2024-01-26

## 2024-01-29 ENCOUNTER — EVALUATION (OUTPATIENT)
Dept: PHYSICAL THERAPY | Facility: CLINIC | Age: 64
End: 2024-01-29
Payer: MEDICARE

## 2024-01-29 DIAGNOSIS — M17.12 PRIMARY OSTEOARTHRITIS OF LEFT KNEE: Primary | ICD-10-CM

## 2024-01-29 DIAGNOSIS — Z01.818 PREOP TESTING: ICD-10-CM

## 2024-01-29 PROCEDURE — 97530 THERAPEUTIC ACTIVITIES: CPT | Performed by: PHYSICAL THERAPIST

## 2024-01-29 PROCEDURE — 97161 PT EVAL LOW COMPLEX 20 MIN: CPT | Performed by: PHYSICAL THERAPIST

## 2024-01-29 PROCEDURE — 97112 NEUROMUSCULAR REEDUCATION: CPT | Performed by: PHYSICAL THERAPIST

## 2024-01-29 NOTE — PROGRESS NOTES
PT Evaluation     Today's date: 24  Patient name: Ayleen Moralez  : 1960  MRN: [id number]  Referring provider: Yari Wallace PA-C  Dx:   Encounter Diagnosis     ICD-10-CM    1. Primary osteoarthritis of left knee  M17.12 Ambulatory Referral to Physical Therapy      2. Primary osteoarthritis of right knee  M17.11 Ambulatory Referral to Physical Therapy          Assessment  Assessment details: Ayleen Moralez is a 62 y.o. female presenting to outpatient physical therapy at [facility] with complaints of L knee pain, stiffness and weakness.  She presents with decreased L knee range of motion, decreased strength, limited flexibility, poor postural awareness, poor body mechanics, altered gait pattern, poor balance, decreased tolerance to activity and decreased functional mobility due to Primary osteoarthritis of left knee  Lumbar radiculopathy.  She would benefit from skilled PT services in order to address these deficits and reach maximum level of function.  Thank you for the referral!    Impairments: abnormal or restricted ROM, activity intolerance, impaired balance, impaired physical strength, lacks appropriate home exercise program and pain with function    Symptom irritability: moderateUnderstanding of Dx/Px/POC: good   Prognosis: good    Goals  STG  1. Independent with HEP in 4 weeks  2. Decrease pain at worst by 50% in 4 weeks    LTG  1. Increase LLE strength in all planes to 5/5 in 8 weeks  2. Return to full, unrestricted stair & step negotiation in 8 weeks    Plan  Patient would benefit from: skilled physical therapy  Planned modality interventions: thermotherapy: hydrocollator packs  Planned therapy interventions: manual therapy, neuromuscular re-education, therapeutic activities, therapeutic exercise and home exercise program  Frequency: 2x week  Duration in weeks: 8  Treatment plan discussed with: patient        Subjective Evaluation    History of Present Illness  Date of onset:  "2024  Mechanism of injury: Pt reports chronic history of L knee pain and weakness, worse with steps. Pt states that prednisone helps but that she is on her last dose today.   Quality of life: good    Patient Goals  Patient goals for therapy: decreased pain and increased strength  Patient goal: painfree stair & step negotiation   Pain  Current pain ratin  At best pain ratin  At worst pain rating: 10  Location: posteriormedial L knee  Quality: sharp      Diagnostic Tests  X-ray: abnormal (Moderate osteoarthritis with narrowing of the medial tibiofemoral joint and small osteophytes seen.)  Treatments  Previous treatment: injection treatment and medication        Objective     Active Range of Motion   Left Knee   Flexion: 115 degrees with pain  Extension: 5 degrees with pain    Right Knee   Normal active range of motion    Strength/Myotome Testing     Left Knee   Flexion: 4-  Extension: 4-  Quadriceps contraction: fair    Right Knee   Normal strength    Tests     Left Knee   Negative anterior drawer, lateral Kateryna, medial Kateryna, posterior drawer, posterior Lachman, Thessaly's test at 5 degrees and Thessaly's test at 20 degrees.     L TKA Scheduled for 2024  EPOC: 3/29/24      Manuals             L Knee PROM             Gentle L tibiofemoral mobs                                       Neuro Re-Ed             L quad set             HS curl w/swiss ball x25            SLR iso x25 3\" ea            SAQ iso             LAQ iso x25 2lb 5\" ea            Reverse squat slant x25 5\"                         Ther Ex             HR x25            TR x25            Slant board 10x10\"            Clam shell supine x25 [place]ach TB                                                    L Knee TERT with heat Into EXT             Ther Activity             LE Bike for improved L knee ROM 6' L2            Pt education: pathoanatomy, nature of sxs, POC, HEP 2' NS            Gait Training                                     "   Modalities

## 2024-01-30 ENCOUNTER — OFFICE VISIT (OUTPATIENT)
Dept: FAMILY MEDICINE CLINIC | Facility: HOSPITAL | Age: 64
End: 2024-01-30
Payer: MEDICARE

## 2024-01-30 ENCOUNTER — TELEPHONE (OUTPATIENT)
Dept: OBGYN CLINIC | Facility: HOSPITAL | Age: 64
End: 2024-01-30

## 2024-01-30 VITALS
BODY MASS INDEX: 37.39 KG/M2 | OXYGEN SATURATION: 93 % | HEIGHT: 64 IN | DIASTOLIC BLOOD PRESSURE: 84 MMHG | SYSTOLIC BLOOD PRESSURE: 150 MMHG | WEIGHT: 219 LBS | HEART RATE: 90 BPM

## 2024-01-30 DIAGNOSIS — K21.9 GASTROESOPHAGEAL REFLUX DISEASE, UNSPECIFIED WHETHER ESOPHAGITIS PRESENT: ICD-10-CM

## 2024-01-30 DIAGNOSIS — Z01.818 PRE-OP EXAM: Primary | ICD-10-CM

## 2024-01-30 DIAGNOSIS — R11.0 NAUSEA: ICD-10-CM

## 2024-01-30 DIAGNOSIS — M17.12 PRIMARY OSTEOARTHRITIS OF LEFT KNEE: ICD-10-CM

## 2024-01-30 DIAGNOSIS — I10 PRIMARY HYPERTENSION: ICD-10-CM

## 2024-01-30 DIAGNOSIS — F31.81 BIPOLAR 2 DISORDER (HCC): Chronic | ICD-10-CM

## 2024-01-30 DIAGNOSIS — E78.00 HYPERCHOLESTEREMIA: ICD-10-CM

## 2024-01-30 DIAGNOSIS — F11.20 CONTINUOUS OPIOID DEPENDENCE (HCC): ICD-10-CM

## 2024-01-30 DIAGNOSIS — E66.01 OBESITY, MORBID (HCC): ICD-10-CM

## 2024-01-30 DIAGNOSIS — M46.1 BILATERAL SACROILIITIS (HCC): ICD-10-CM

## 2024-01-30 DIAGNOSIS — M25.562 ACUTE PAIN OF LEFT KNEE: ICD-10-CM

## 2024-01-30 PROCEDURE — 99214 OFFICE O/P EST MOD 30 MIN: CPT | Performed by: STUDENT IN AN ORGANIZED HEALTH CARE EDUCATION/TRAINING PROGRAM

## 2024-01-30 RX ORDER — ROSUVASTATIN CALCIUM 20 MG/1
20 TABLET, COATED ORAL EVERY EVENING
Qty: 90 TABLET | Refills: 3 | Status: SHIPPED | OUTPATIENT
Start: 2024-01-30

## 2024-01-30 RX ORDER — OMEPRAZOLE 40 MG/1
40 CAPSULE, DELAYED RELEASE ORAL
Qty: 90 CAPSULE | Refills: 1 | Status: SHIPPED | OUTPATIENT
Start: 2024-01-30

## 2024-01-30 RX ORDER — CLONAZEPAM 0.5 MG/1
0.5 TABLET ORAL 3 TIMES DAILY
Qty: 270 TABLET | Refills: 0 | Status: SHIPPED | OUTPATIENT
Start: 2024-01-30 | End: 2024-04-29

## 2024-01-30 RX ORDER — ONDANSETRON 4 MG/1
4 TABLET, FILM COATED ORAL EVERY 8 HOURS PRN
Qty: 30 TABLET | Refills: 0 | Status: SHIPPED | OUTPATIENT
Start: 2024-01-30

## 2024-01-30 NOTE — PATIENT INSTRUCTIONS
Ayleen Moralez [id number]   Thanks for presenting to today's Appointment at [facility]  Complete labs prior to next to [facility]  Your medications were not changed

## 2024-01-30 NOTE — PSYCH
"  [facility]: [facility]   [address] [id number] [phone]    Psychiatric Progress Note  MRN#: [id number]  Ayleen Moralez 63 y.o. female    This note was not shared with the patient due to reasonable likelihood of causing patient harm       _________________________________________________________________________________________________________________________________  OFFICE APPOINTMENT   Seen today at [facility] location                                                Patient Ayleen Moralez ,1960   Prescriber/Physician: Esme Medina DO Physician Location:   [facility]  [address] [address]     This service was provided in the office.  Patient is currently located in the [place], where I am  licensed.   Patient gave consent to proceed with encounter; acknowledge understanding of security and privacy of encounter   [facility]  Patient verbalized understanding evaluation only involves Psychiatric diagnosing, prescribing, result monitoring   Patient was informed this is a billable service and legal   ___________________________________________________________________________________________________________________________________     Reason for Visit:                         Chief Complaint   Patient presents with    Psychosis       Subjective: Ayleen Moralez slight agitation , frustration  , pissed , slept thru the night but woke up at night had\" chills , improved later .  Ayleen complained of anxiety while driving and going up stairs and thinks OCD there routine monitor to ensure water was cut off.    ROS:  Psychosis : paranoia about sister talking about her ( sister and necies, and stated knowing there snooping in her stuff, scu has seeing tings not  put back like Ayleen had them   Si/Hi-  " denies   OCD- defer      Medication: Ayleen Moralez is did not increase Haldol to 10mg x 2, remained on Haldol 2mg PRN,  Seroquel 700mg, , Lamotrgine 400mg and Luvox 300mg   Med s/e- denies      Medical ROS:   Cardiovascular: negative for chest pain, chest pressure/discomfort, and palpitations  Musculoskeletal:  Positive arthritic extremities pain   Neurological: negative falls or injuries, headiness , dizziness   Also other Pertinent items are noted in above, all other symptoms are negative           Medications Trials:  History of ECT x 30 yrs ago- did not work , Effexor, Imprimine, Lithium- can't take due to kidney dz( external records reviewed- CKD stage 4) , Risperidone - not sure as to why , Tofinail, Xanax,  Zoloft- did not work;Buspar - did not work ,  Luvox- not sure why first attempt was stopped, Prozac - was stopped cause of pregnancy, Trazodone- not sure, Aripiprazole 10mg- tiredness      Mental Status Evaluation:  General Appearance:  Ayleen Moralez is a 63 y.o.  female casually dressed, looks stated age, Wearing glasses .    Behavior:  energetic, cooperative, intermittent eye contact   Speech:  Less talkative  difficult to redirect, WNL, rhythm, volume, latency, amount.    Mood:  Normal to slight frustration   Affect:  Normal >slight frustration    Thought Process:  Circumstantial to tangential  disorganized, logical to illogical    Thought Content:  persecutory delusions, ruminations   Perceptual Disturbances: auditory hallucinations , Does not appear responding or preoccupied   Delusions  YES-accusatory    Risk Potential: Suicidal Ideations w/o  Homicidal Ideations w/o  Potential for Aggression w/o   Sensorium:  Oriented to person, place ( [facility]), time/date ( February 2024)and situation    Memory:  recent and remote memory grossly intact   Consciousness:  alert and awake   Attention: Attention and concentration are impaired   Insight:  Limited to fair   Judgment: Limited  to fair   Gait/Station: ataxic   Motor Activity: no abnormal movements     There were no vitals filed for this visit.     Medications:   Current Outpatient Medications on File Prior to Visit   Medication Sig Dispense Refill    acetaminophen (TYLENOL) 650 mg suppository Insert 650 mg into the rectum every 4 (four) hours as needed for mild pain      AMILoride 5 mg tablet Take 1 tablet (5 mg total) by mouth 2 (two) times a day (Patient taking differently: Take 5 mg by mouth 2 (two) times a day) 180 tablet 3    amLODIPine (NORVASC) 5 mg tablet Take 1 tablet (5 mg total) by mouth daily 90 tablet 3    carvedilol (COREG) 25 mg tablet Take 1 tablet (25 mg total) by mouth 2 (two) times a day with meals 180 tablet 3    clonazePAM (KlonoPIN) 0.5 mg tablet Take 1 tablet (0.5 mg total) by mouth 3 (three) times a day 270 tablet 0    fluvoxaMINE (LUVOX) 100 mg tablet Fluvoxamine 100m tablet in the morning ; 2 tablets at night (Patient taking differently: Take 100 mg by mouth 2 (two) times a day Fluvoxamine 100m tablet in the morning ; 2 tablets at night) 270 tablet 1    folic acid (FOLVITE) 1 mg tablet Take 1 tablet (1 mg total) by mouth daily 30 tablet 2    haloperidol (HALDOL) 10 mg tablet Haldoperidol 10m tablet in the morning and 1 tablet at night (Patient taking differently: Take 10 mg by mouth 2 (two) times a day Haldoperidol 10m tablet in the morning and 1 tablet at night) 30 tablet 1    haloperidol (HALDOL) 2 mg tablet Haloperidol 2m tablet po daily in the afternoon, 1 tablet PRN 60 tablet 1    lamoTRIgine (LaMICtal) 200 MG tablet Lamotrigine 200m tablet in the morning and 1 tablet at night (Patient taking differently: Take 200 mg by mouth 2 (two) times a day Lamotrigine 200m tablet in the morning and 1 tablet at night) 180 tablet 0    omeprazole (PriLOSEC) 40 MG capsule Take 1 capsule (40 mg total) by mouth daily before breakfast 90 capsule 1    ondansetron (ZOFRAN) 4 mg tablet Take 1 tablet (4  mg total) by mouth every 8 (eight) hours as needed for nausea or vomiting 30 tablet 0    QUEtiapine (SEROquel) 300 mg tablet Quetiapine 300m tablet po  in morning (Patient taking differently: Take 300 mg by mouth in the morning Quetiapine 300m tablet po  in morning) 90 tablet 0    QUEtiapine (SEROquel) 400 MG tablet Quetiapine Fumarate 400m tablet at night (Patient taking differently: Take 400 mg by mouth daily at bedtime Quetiapine Fumarate 400m tablet at night) 90 tablet 0    rosuvastatin (CRESTOR) 20 MG tablet Take 1 tablet (20 mg total) by mouth every evening 90 tablet 3    albuterol (Ventolin HFA) 90 mcg/act inhaler Inhale 2 puffs every 6 (six) hours as needed for wheezing or shortness of breath 8.5 g 3    alendronate (Fosamax) 70 mg tablet Take 1 tablet (70 mg total) by mouth every 7 days (Patient taking differently: Take 70 mg by mouth every 7 days Pt takes on a Saturday) 4 tablet 5    ascorbic acid (VITAMIN C) 500 MG tablet Take 1 tablet (500 mg total) by mouth 2 (two) times a day 60 tablet 2    budesonide-formoterol (Symbicort) 160-4.5 mcg/act inhaler Inhale 2 puffs 2 (two) times a day Rinse mouth after use. (Patient taking differently: Inhale 2 puffs if needed Rinse mouth after use.) 10.2 g 11    cholecalciferol (VITAMIN D3) 1,000 units tablet Take 5 tablets (5,000 Units total) by mouth daily 90 tablet 5    Cyanocobalamin (VITAMIN B 12 PO) Take 1,000 mcg by mouth in the morning      ferrous sulfate 324 (65 Fe) mg Take 1 tablet (324 mg total) by mouth 2 (two) times a day before meals 60 tablet 2    Multiple Vitamin (MULTI-VITAMIN) tablet Take 1 tablet by mouth daily 30 tablet 2    traMADol (Ultram) 50 mg tablet Take 1 tablet (50 mg total) by mouth every 12 (twelve) hours as needed for moderate pain 60 tablet 0    [DISCONTINUED] acetaminophen (TYLENOL) 650 mg CR tablet Take 1 tablet (650 mg total) by mouth every 8 (eight) hours as needed for mild pain (Patient taking differently: Take 1,300  mg by mouth every 8 (eight) hours as needed for mild pain) 30 tablet 0     No current facility-administered medications on file prior to visit.        Labs: I have personally reviewed all pertinent laboratory/tests results.   Most Recent Labs:     Appointment on 01/23/2024   Component Date Value Ref Range Status    Ventricular Rate 01/23/2024 91  BPM Final    Atrial Rate 01/23/2024 91  BPM Final    VT Interval 01/23/2024 144  ms Final    QRSD Interval 01/23/2024 126  ms Final    QT Interval 01/23/2024 424  ms Final    QTC Interval 01/23/2024 521  ms Final    P Axis 01/23/2024 22  degrees Final    QRS [place] 01/23/2024 221  degrees Final    T Wave Axis 01/23/2024 10  degrees Final   Appointment on 01/23/2024   Component Date Value Ref Range Status    Sodium 01/23/2024 143  135 - 147 mmol/L Final    Potassium 01/23/2024 4.6  3.5 - 5.3 mmol/L Final    Chloride 01/23/2024 110 (H)  96 - 108 mmol/L Final    CO2 01/23/2024 24  21 - 32 mmol/L Final    ANION GAP 01/23/2024 9  mmol/L Final    BUN 01/23/2024 39 (H)  5 - 25 mg/dL Final    Creatinine 01/23/2024 2.62 (H)  0.60 - 1.30 mg/dL Final    Standardized to IDMS reference method    Glucose, Fasting 01/23/2024 129 (H)  65 - 99 mg/dL Final    Calcium 01/23/2024 10.0  8.4 - 10.2 mg/dL Final    AST 01/23/2024 16  13 - 39 U/L Final    ALT 01/23/2024 13  7 - 52 U/L Final    Specimen collection should occur prior to Sulfasalazine administration due to the potential for falsely depressed results.     Alkaline Phosphatase 01/23/2024 242 (H)  34 - 104 U/L Final    Total Protein 01/23/2024 7.3  6.4 - 8.4 g/dL Final    Albumin 01/23/2024 4.3  3.5 - 5.0 g/dL Final    Total Bilirubin 01/23/2024 0.27  0.20 - 1.00 mg/dL Final    Use of this assay is not recommended for patients undergoing treatment with eltrombopag due to the potential for falsely elevated results.  N-acetyl-p-benzoquinone imine (metabolite of Acetaminophen) will generate erroneously low results in samples for patients that  have taken an overdose of Acetaminophen.    eGFR 01/23/2024 19  ml/min/1.73sq m Final    WBC 01/23/2024 6.17  4.31 - 10.16 Thousand/uL Final    RBC 01/23/2024 3.74 (L)  3.81 - 5.12 Million/uL Final    Hemoglobin 01/23/2024 11.7  11.5 - 15.4 g/dL Final    Hematocrit 01/23/2024 38.5  34.8 - 46.1 % Final    MCV 01/23/2024 103 (H)  82 - 98 fL Final    MCH 01/23/2024 31.3  26.8 - 34.3 pg Final    MCHC 01/23/2024 30.4 (L)  31.4 - 37.4 g/dL Final    RDW 01/23/2024 12.8  11.6 - 15.1 % Final    MPV 01/23/2024 10.8  8.9 - 12.7 fL Final    Platelets 01/23/2024 234  149 - 390 Thousands/uL Final    nRBC 01/23/2024 0  /100 WBCs Final    Neutrophils Relative 01/23/2024 71  43 - 75 % Final    Immat GRANS % 01/23/2024 1  0 - 2 % Final    Lymphocytes Relative 01/23/2024 19  14 - 44 % Final    Monocytes Relative 01/23/2024 8  4 - 12 % Final    Eosinophils Relative 01/23/2024 0  0 - 6 % Final    Basophils Relative 01/23/2024 1  0 - 1 % Final    Neutrophils Absolute 01/23/2024 4.41  1.85 - 7.62 Thousands/µL Final    Immature Grans Absolute 01/23/2024 0.03  0.00 - 0.20 Thousand/uL Final    Lymphocytes Absolute 01/23/2024 1.18  0.60 - 4.47 Thousands/µL Final    Monocytes Absolute 01/23/2024 0.51  0.17 - 1.22 Thousand/µL Final    Eosinophils Absolute 01/23/2024 0.01  0.00 - 0.61 Thousand/µL Final    Basophils Absolute 01/23/2024 0.03  0.00 - 0.10 Thousands/µL Final    Protime 01/23/2024 13.1  11.6 - 14.5 seconds Final    INR 01/23/2024 0.95  0.84 - 1.19 Final    PTT 01/23/2024 30  23 - 37 seconds Final    Therapeutic Heparin Range =  60-90 seconds    Hemoglobin A1C 01/23/2024 6.6 (H)  Normal 4.0-5.6%; PreDiabetic 5.7-6.4%; Diabetic >=6.5%; Glycemic control for adults with diabetes <7.0% % Final    EAG 01/23/2024 143  mg/dl Final    Iron Saturation 01/23/2024 17  15 - 50 % Final    TIBC 01/23/2024 297  250 - 450 ug/dL Final    Iron 01/23/2024 50  50 - 212 ug/dL Final    Patients treated with metal-binding drugs (ie. Deferoxamine) may have  "depressed iron values.    UIBC 01/23/2024 247  155 - 355 ug/dL Final    Ferritin 01/23/2024 72  11 - 307 ng/mL Final    Retic Ct Abs 01/23/2024 61,000  14,097 - 95,744 Final    Retic Ct Pct 01/23/2024 1.61  0.37 - 1.87 % Final   Appointment on 01/18/2024   Component Date Value Ref Range Status    ABO Grouping 01/23/2024 B   Final    Rh Factor 01/23/2024 Positive   Final    Antibody Screen 01/23/2024 Negative   Final    Specimen Expiration Date 01/23/2024 20240220   Final     Lipids abnormal high   08/2022 09/08/23 ECHO ( EF 65%).       LABS 11/13/23 CMP WNL except high BUN/CR - h/o kidney dz             11/14/23 CBC diff WNL , milding low WBC, RBC, H/H  EKG 11/10/23, NSR, RBBB ,   ________________________________________________________________________    A/P  Ayleen Moralez,63 y.o.  female, history of  Polysubstance abuse ( cocaine, marijuana), multiple hospitalizations, several suicide attempts, anorexia, OCD, bipolar disorder, presented w/ generalized anxiety , several neurovegetative symptoms. Interim events of percustory delusions, D/C Aripiprazole - s/e \"spacy\". There's history of head trauma and cognitive complaints, findings later of OCD . Started  Venlafaxine cause of antidepressant precipitated hanh.. Increased Haldol and D/C Venlafaxine . Interim events of hanh , Now without  less hanh since adherence to higher dose Haldol, although persistent psychosis    DSM5  1. Encounter for long-term (current) use of other medications    2. Bipolar I disorder, current or most recent episode manic, severe with psychotic features (HCC)    3 Obsessive-compulsive disorder, unspecified type                PLAN:    History and external records reviewed.    Labs 01/2024 WNL CBC , CMP, except high BUN/CR  Discussed clinical findings and  diagnostic impression: improving hanh  Did not change other medications      Medications Prescribed During [facility] Encounter:    -     lamoTRIgine (LaMICtal) 200 MG " tablet; Lamotrigine 200m tablet in the morning and 1 tablet at night    -     fluvoxaMINE (LUVOX) 100 mg tablet; Fluvoxamine 100m tablet in the morning ; 2 tablets at night    -     QUEtiapine (SEROquel) 300 mg tablet; Quetiapine 300m tablet po  in morning      Medication List Also  -     haloperidol (HALDOL) 10 mg tablet; Haldoperidol 10m tablet in the morning and 1 tablet at night  -     haloperidol (HALDOL) 2 mg tablet; Haloperidol 2m tablet po daily in the afternoon, 1 tablet PRN  -     benztropine (COGENTIN) 1 mg tablet; Benztropine Mesylate 1 m tablet in the morning and 1 tablet at night  -     QUEtiapine (SEROquel) 400 MG tablet; Quetiapine Fumarate 400m tablet at night                Ayleen Moralez - Pharmacies: [facility] ;   Lamotrigine , Quetiapine-                                                              [facility] Haldol , Fluvoxamine       Treatement: Risks, benefits, and possible side effects of medications explained to patient and patient verbalizes understanding.      Next [facility]: Appointment    ____________________________________________________________________________________________    Patient Encounter: 11:25- 12pm  Documentation Time:    Encounter Duration: Time Spent 41 mins with Patient.Greater than 50% of total time was spent with the patient       MDM  Number of Diagnoses or Management Options  Diagnosis management comments: 2       Amount and/or Complexity of Data Reviewed  Clinical lab tests: ordered and reviewed  Review and summarize past medical records: yes    Risk of Complications, Morbidity, and/or Mortality  Presenting problems: moderate  Diagnostic procedures: moderate  Management options: moderate      This note may have been written with the assistance of dictation software. Please excuse any grammatical  errors, misspellings,  and abnormal spacing of letters , sentences or paragraphs . For accurate interpretation read note horizontally

## 2024-01-30 NOTE — PROGRESS NOTES
[facility] PRE-OPERATIVE EVALUATION  [facility] PHYSICIAN GROUP - [facility] [address]  NAME: Ayleen Moralez  AGE: 63 y.o. SEX: female  : 1960   DATE: 2024     History of Present Illness:     Ayleen Moralez is a 63 y.o. female who presents to the office today for a preoperative consultation at the request of surgeon, Dr. Ma, who plans on performing L knee replacement on 24. Planned anesthesia is regional and general. Patient has a bleeding risk of: no recent abnormal bleeding. Patient does not have objections to receiving blood products if needed. Current anti-platelet/anti-coagulation medications that the patient is prescribed includes:  None .      Assessment of Chronic Conditions:   - Asthma: on symbicort prn & albuterol prn  - Hypertension: on coreg & amiloride & amlodipine   - Bipolar - on psych meds on list      Assessment of Cardiac Risk:  Denies unstable or severe angina or MI in the last 6 weeks or history of stent placement in the last year   Denies decompensated heart failure (e.g. New onset heart failure, NYHA functional class IV heart failure, or worsening existing heart failure)  Denies significant arrhythmias such as high grade AV block, symptomatic ventricular arrhythmia, newly recognized ventricular tachycardia, supraventricular tachycardia with resting heart rate >100, or symptomatic bradycardia  Denies severe heart valve disease including aortic stenosis or symptomatic mitral stenosis  Hx -   Echo done 2018- mild pulmonary valve regurg and normal lv ej fraction- grade 1 diastolic dysfunction noted     Exercise Capacity:  Able to walk 4 blocks without symptoms?: Yes  Able to walk 2 flights without symptoms?: Yes    Prior Anesthesia Reactions: No  Personal history of venous thromboembolic disease? No  History of steroid use for >2 weeks within last year? No     Review of Systems:     Review of Systems   Constitutional:  Negative for chills and  fever.   Respiratory:  Negative for cough and shortness of breath.    Cardiovascular:  Negative for chest pain and palpitations.   Musculoskeletal:  Positive for arthralgias and gait problem.   Neurological:  Positive for light-headedness (only with fast get ups). Negative for dizziness and headaches.     Current Problem List:     Patient Active Problem List   Diagnosis    Hypercholesteremia    Spondylolisthesis at L5-S1 level    Renal cyst    Vitamin D deficiency    Secondary renal hyperparathyroidism ([facility])    Herniated nucleus pulposus, L5-S1    Idiopathic chronic pancreatitis ([facility])    Primary osteoarthritis of right knee    Age-related osteoporosis without current pathological fracture    Cardiac murmur    Rectal prolapse    Elevated LFTs    Primary hypertension    Hyperprolactinemia ([facility])    Mood disorder ([facility])    Obsessive compulsive disorder    Panic disorder with agoraphobia    Eating disorder    Hyperparathyroidism ([facility])    Benign neoplasm of colon    Drug-induced constipation    Infarction of left basal ganglia ([facility])    Abnormal chest CT    Hyperphosphatemia    Abnormal thyroid exam    Thyroid nodule    Moderate persistent asthma without complication    Primary osteoarthritis of left knee    Steal syndrome dialysis vascular access ([facility])    AVF (arteriovenous fistula) ([facility])    Right patellofemoral syndrome    CKD (chronic kidney disease) stage 4, GFR 15-29 ml/min ([facility])    Bilateral sacroiliitis ([facility])    Hallux valgus, right    Acquired hallux interphalangeus of right foot    Effusion of right knee    Left knee tendonitis    Family history of cardiac disorder in father    Anxiety    Claw toe, acquired, right    Injury of plantar plate, right, initial encounter    Acquired hallux interphalangeus, right    Closed Colles' fracture of right radius    Aftercare following surgery of the musculoskeletal system    Acute shoulder pain    Essential hypertension    Nausea    GERD (gastroesophageal reflux disease)    End  stage renal disease (HCC)    Injury of right thumb    Pain of right upper extremity    Continuous opioid dependence (HCC)    Severe depressed bipolar I disorder without psychotic features (HCC)    Mixed obsessional thoughts and acts    Eating disorder, unspecified    Chronic back pain    Obesity, morbid (HCC)    Pes anserinus bursitis of both knees    Umbilical hernia    Shortness of breath    Leg swelling    Obesity (BMI 30-39.9)    Concussion without loss of consciousness    Pulmonary embolism with infarction (HCC)    ILD (interstitial lung disease) ([facility])    Statin intolerance    Moderate aortic stenosis    Bipolar I disorder, current or most recent episode manic, severe with mood-incongruent psychotic features (HCC)    Generalized anxiety disorder    Primary localized osteoarthritis of knees, bilateral    Acute pain of left knee       Allergies:     Allergies   Allergen Reactions    Molds & Smuts Nasal Congestion    Bee Pollen Nasal Congestion     Other reaction(s): Nasal Congestion    Pollen Extract Nasal Congestion       Current Medications:       Current Outpatient Medications:     acetaminophen (TYLENOL) 650 mg CR tablet, Take 1 tablet (650 mg total) by mouth every 8 (eight) hours as needed for mild pain (Patient taking differently: Take 1,300 mg by mouth every 8 (eight) hours as needed for mild pain), Disp: 30 tablet, Rfl: 0    albuterol (Ventolin HFA) 90 mcg/act inhaler, Inhale 2 puffs every 6 (six) hours as needed for wheezing or shortness of breath, Disp: 8.5 g, Rfl: 3    alendronate (Fosamax) 70 mg tablet, Take 1 tablet (70 mg total) by mouth every 7 days (Patient taking differently: Take 70 mg by mouth every 7 days Pt takes on a Saturday), Disp: 4 tablet, Rfl: 5    AMILoride 5 mg tablet, Take 1 tablet (5 mg total) by mouth 2 (two) times a day (Patient taking differently: Take 5 mg by mouth 2 (two) times a day), Disp: 180 tablet, Rfl: 3    amLODIPine (NORVASC) 5 mg tablet, Take 1 tablet (5 mg total) by  mouth daily, Disp: 90 tablet, Rfl: 3    ascorbic acid (VITAMIN C) 500 MG tablet, Take 1 tablet (500 mg total) by mouth 2 (two) times a day, Disp: 60 tablet, Rfl: 2    budesonide-formoterol (Symbicort) 160-4.5 mcg/act inhaler, Inhale 2 puffs 2 (two) times a day Rinse mouth after use. (Patient taking differently: Inhale 2 puffs if needed Rinse mouth after use.), Disp: 10.2 g, Rfl: 11    carvedilol (COREG) 25 mg tablet, Take 1 tablet (25 mg total) by mouth 2 (two) times a day with meals, Disp: 180 tablet, Rfl: 3    cholecalciferol (VITAMIN D3) 1,000 units tablet, Take 5 tablets (5,000 Units total) by mouth daily, Disp: 90 tablet, Rfl: 5    clonazePAM (KlonoPIN) 0.5 mg tablet, Take 1 tablet (0.5 mg total) by mouth 3 (three) times a day, Disp: 270 tablet, Rfl: 0    Cyanocobalamin (VITAMIN B 12 PO), Take 1,000 mcg by mouth in the morning, Disp: , Rfl:     ferrous sulfate 324 (65 Fe) mg, Take 1 tablet (324 mg total) by mouth 2 (two) times a day before meals, Disp: 60 tablet, Rfl: 2    fluvoxaMINE (LUVOX) 100 mg tablet, Fluvoxamine 100m tablet in the morning ; 2 tablets at night (Patient taking differently: Take 100 mg by mouth 2 (two) times a day Fluvoxamine 100m tablet in the morning ; 2 tablets at night), Disp: 270 tablet, Rfl: 1    folic acid (FOLVITE) 1 mg tablet, Take 1 tablet (1 mg total) by mouth daily, Disp: 30 tablet, Rfl: 2    haloperidol (HALDOL) 10 mg tablet, Haldoperidol 10m tablet in the morning and 1 tablet at night (Patient taking differently: Take 10 mg by mouth 2 (two) times a day Haldoperidol 10m tablet in the morning and 1 tablet at night), Disp: 30 tablet, Rfl: 1    haloperidol (HALDOL) 2 mg tablet, Haloperidol 2m tablet po daily in the afternoon, 1 tablet PRN, Disp: 60 tablet, Rfl: 1    lamoTRIgine (LaMICtal) 200 MG tablet, Lamotrigine 200m tablet in the morning and 1 tablet at night (Patient taking differently: Take 200 mg by mouth 2 (two) times a day Lamotrigine 200m  tablet in the morning and 1 tablet at night), Disp: 180 tablet, Rfl: 0    Multiple Vitamin (MULTI-VITAMIN) tablet, Take 1 tablet by mouth daily, Disp: 30 tablet, Rfl: 2    omeprazole (PriLOSEC) 40 MG capsule, Take 1 capsule (40 mg total) by mouth daily before breakfast, Disp: 90 capsule, Rfl: 1    ondansetron (ZOFRAN) 4 mg tablet, Take 1 tablet (4 mg total) by mouth every 8 (eight) hours as needed for nausea or vomiting, Disp: 30 tablet, Rfl: 0    QUEtiapine (SEROquel) 300 mg tablet, Quetiapine 300m tablet po  in morning (Patient taking differently: Take 300 mg by mouth in the morning Quetiapine 300m tablet po  in morning), Disp: 90 tablet, Rfl: 0    QUEtiapine (SEROquel) 400 MG tablet, Quetiapine Fumarate 400m tablet at night (Patient taking differently: Take 400 mg by mouth daily at bedtime Quetiapine Fumarate 400m tablet at night), Disp: 90 tablet, Rfl: 0    rosuvastatin (CRESTOR) 20 MG tablet, Take 1 tablet (20 mg total) by mouth every evening, Disp: 90 tablet, Rfl: 3    traMADol (Ultram) 50 mg tablet, Take 1 tablet (50 mg total) by mouth every 12 (twelve) hours as needed for moderate pain, Disp: 60 tablet, Rfl: 0    Past Medical History:       Past Medical History:   Diagnosis Date    Anxiety     Anxiety disorder     Arthritis     At risk for falls     Bipolar 2 disorder (HCC)     Chronic back pain     Chronic kidney disease     Closed fracture of distal end of right fibula with routine healing 2020    COVID-19     in 2021    CVA (cerebral vascular accident) (HCC)     noted on MRI in the past    Depression     GERD (gastroesophageal reflux disease)     Hypercholesteremia     Hypernatremia     Hypertension     Hypokalemia     Idiopathic chronic pancreatitis (HCC) 2018    Intervertebral disc disorder with radiculopathy of lumbosacral region     resolved: 2015    Kidney disease     Kidney disease     Limb alert care status     LUE-fistula    Panic attacks     Pericardial  effusion     PONV (postoperative nausea and vomiting)     Psychiatric problem     Radiculitis     resolved: 2015    Secondary renal hyperparathyroidism (HCC)     Stroke (HCC)     Vitamin D deficiency         Past Surgical History:   Procedure Laterality Date    BUNIONECTOMY      Left foot     CAST APPLICATION Right 2022    Procedure: Application short-arm thumb spica splint;  Surgeon: Lyndon Victoria MD;  Location: [facility];  Service: Orthopedics    COLON SURGERY      COLONOSCOPY  2021    DILATION AND CURETTAGE OF UTERUS      INDUCED       surgically induced    DE ARTERIOVENOUS ANASTOMOSIS OPEN DIRECT Left 2019    Procedure: CREATION FISTULA ARTERIOVENOUS (AV) left wrists possible left upper;  Surgeon: Andrey Quintero MD;  Location: [facility];  Service: Vascular    DE CORRJ HLX VLGS BNCTY SESMDC DSTL METAR OSTEOT Left 2019    Procedure: Serge bunionectomy;  Surgeon: Munir Larkin DPM;  Location: [facility];  Service: Podiatry    DE CORRJ HLX VLGS BNCTY SESMDC DSTL METAR OSTEOT Right 8/3/2020    Procedure: BUNIONECTOMY RAFAEL;  Surgeon: Munir Larkin DPM;  Location: [facility];  Service: Podiatry    DE CORRJ HLX VLGS BNCTY SESMDC PROX PHLX OSTEOT Right 2021    Procedure: BUNIONECTOMY TAMEKA, right tameka osteotomy and 2nd claw toe correction;  Surgeon: James R Lachman, MD;  Location: [facility];  Service: Orthopedics    DE ERCP DX COLLECTION SPECIMEN BRUSHING/WASHING N/A 2018    Procedure: ENDOSCOPIC RETROGRADE CHOLANGIOPANCREATOGRAPHY (ERCP);  Surgeon: Alfredo Messina MD;  Location: [facility];  Service: Gastroenterology    DE LAPAROSCOPY PROCTOPEXY PROLAPSE N/A 2018    Procedure: ROBOTIC SIGMOID RESECTION / RECTOPEXY;  Surgeon: ELPIDIO Mcnulty MD;  Location: [facility];  Service: Colorectal    DE OPEN TREATMENT RADIAL SHAFT FRACTURE Right 10/11/2021    Procedure: OPEN REDUCTION W/ INTERNAL FIXATION (ORIF) RADIUS (WRIST), RIGHT DISTAL;  Surgeon: Riki Ma,  MD;  Location: [facility];  Service: Orthopedics    VT REMOVAL IMPLANT DEEP Right 6/23/2022    Procedure: Removal of hardware volar aspect right distal radius (distal radial plate and screws);  Surgeon: Lyndon Victoria MD;  Location: [facility];  Service: Orthopedics    VT SIGMOIDOSCOPY FLX DX W/COLLJ SPEC BR/WA IF PFRMD N/A 7/13/2018    Procedure: SIGMOIDOSCOPY FLEXIBLE;  Surgeon: ELPIDIO Mcnulty MD;  Location: [facility];  Service: Colorectal    VT TR TDN RESTORE INTRNSC FUNCJ ALL 4 FNGRS Right 6/23/2022    Procedure: Right ring finger flexor digitorum superficialis to flexor pollicis longus tendon transfer;  Surgeon: Lyndon Victoria MD;  Location: [facility];  Service: Orthopedics    TUBAL LIGATION Bilateral 1997    US GUIDED THYROID BIOPSY  7/30/2019        Family History   Problem Relation Age of Onset    Bipolar disorder Mother     Mental illness Mother         depression    Stroke Mother     Dementia Mother     Colon polyps Mother     Heart disease Father     Hypertension Father     Diabetes Father     Other Family         Back disorder    Diabetes Family     Heart disease Family     Hypertension Family     Stroke Family     Thyroid disease Family     Breast cancer Paternal Grandmother         age unknown    Breast cancer Paternal Aunt         age unknown    Breast cancer Maternal Aunt         age unknown    Mental illness Sister     Colon polyps Sister     Mental illness Sister     Heart disease Sister     No Known Problems Sister     Breast cancer Sister 68    Other Son         pituitary tumor    Hypertension Son     Obesity Son     No Known Problems Son     No Known Problems Maternal Grandmother     No Known Problems Maternal Grandfather     No Known Problems Paternal Grandfather     Breast cancer Paternal Aunt         age unknown    Substance Abuse Neg Hx         neg fam hx    Colon cancer Neg Hx         Social History     Socioeconomic History    Marital status:      Spouse name: Not on  file    Number of children: Not on file    Years of education: Not on file    Highest education level: Not on file   Occupational History    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   Tobacco Use    Smoking status: Never    Smokeless tobacco: Never   Vaping Use    Vaping status: Never Used   Substance and Sexual Activity    Alcohol use: Not Currently    Drug use: Not Currently     Types: Hydrocodone, Marijuana     Comment: MEDICATION   Ayleen has h/o marijuana abuse- not currently    Sexual activity: Not Currently   Other Topics Concern    Not on file   Social History Narrative    Daily caffeine consumption 2-3 servings a day    Lives with family.    No living will.     Has dentures---no dental care.     Primary language--English.     Feels safe at home.     Social Determinants of Health     Financial Resource Strain: Medium Risk (12/7/2022)    Overall Financial Resource Strain (CARDIA)     Difficulty of Paying Living Expenses: Somewhat hard   Food Insecurity: No Food Insecurity (11/13/2023)    Hunger Vital Sign     Worried About Running Out of Food in the Last Year: Never true     Ran Out of Food in the Last Year: Never true   Transportation Needs: No Transportation Needs (11/13/2023)    PRAPARE - Transportation     Lack of Transportation (Medical): No     Lack of Transportation (Non-Medical): No   Physical Activity: Inactive (4/19/2021)    Exercise Vital Sign     Days of Exercise per Week: 0 days     Minutes of Exercise per Session: 0 min   Stress: Stress Concern Present (4/19/2021)    [employer] [place] - Occupational Stress Questionnaire     Feeling of Stress : To some extent   Social Connections: Not on file   Intimate Partner Violence: Not At Risk (4/19/2021)    Humiliation, Afraid, Rape, and Kick questionnaire     Fear of Current or Ex-Partner: No     Emotionally Abused: No     Physically Abused: No     Sexually Abused: No   Housing Stability: Low Risk  (11/13/2023)    Housing Stability Vital Sign     " Unable to Pay for Housing in the Last Year: No     Number of Places Lived in the Last Year: 1     Unstable Housing in the Last Year: No        Physical Exam:     /84   Pulse 90   Ht 5' 4\" (1.626 m)   Wt 99.3 kg (219 lb)   LMP  (LMP Unknown)   SpO2 93%   BMI 37.59 kg/m²     Physical Exam  Vitals and nursing note reviewed.   Constitutional:       General: She is not in acute distress.     Appearance: Normal appearance. She is obese. She is not ill-appearing.   HENT:      Head: Normocephalic and atraumatic.   Eyes:      General: No scleral icterus.        Right eye: No discharge.         Left eye: No discharge.   Cardiovascular:      Rate and Rhythm: Normal rate and regular rhythm.      Pulses: Normal pulses.      Heart sounds: Murmur heard.   Pulmonary:      Effort: Pulmonary effort is normal. No respiratory distress.      Breath sounds: Normal breath sounds. No stridor. No wheezing.   Musculoskeletal:         General: Deformity (R ring finger, contracture, mild) present.      Cervical back: Normal range of motion and neck supple. No rigidity.      Right lower leg: No edema.      Left lower leg: No edema.   Neurological:      Mental Status: She is alert and oriented to person, place, and time.      Gait: Gait abnormal (antalgic).   Psychiatric:         Mood and Affect: Mood normal.         Behavior: Behavior normal.         Thought Content: Thought content normal.         Judgment: Judgment normal.          Data:     Pre-operative work-up    Laboratory Results: I have personally reviewed the pertinent laboratory results/reports   Hb 11.7, kidney function stable BUN 39, Cr 2.62, eGFR 19     EKG: I have personally reviewed pertinent films in PACS  1/23/24 - 91 bpm, NSR with RBBB    Chest x-ray: I have personally reviewed pertinent reports.    1/23/24 - [facility]    Previous cardiopulmonary studies within the past year:  Echocardiogram: 9/8/23 -   Left Ventricle: Left ventricular cavity size is normal. Wall " thickness is mildly increased. The left ventricular ejection fraction is 65%. Systolic function is normal. Wall motion is normal. Diastolic function is moderately abnormal, consistent with grade II (pseudonormal) relaxation.   Left Atrium: The atrium is mildly dilated.  Aortic Valve: The aortic valve is trileaflet. The leaflets are moderately calcified. There is moderately reduced mobility. There is trace regurgitation. There is moderate stenosis. The aortic valve mean gradient is 14.0 mmHg. The aortic valve area is 1.24 cm2.  Mitral Valve: There is mild annular calcification. There is trace regurgitation.  Tricuspid Valve: There is mild regurgitation.      Assessment & Recommendations:     1. Pre-op exam        2. Primary osteoarthritis of left knee  Ambulatory referral to Family Practice      3. Acute pain of left knee        4. Nausea  ondansetron (ZOFRAN) 4 mg tablet      5. Continuous opioid dependence (HCC)        6. Bilateral sacroiliitis (HCC)        7. Obesity, morbid (HCC)        8. Gastroesophageal reflux disease, unspecified whether esophagitis present  omeprazole (PriLOSEC) 40 MG capsule      9. Hypercholesteremia  rosuvastatin (CRESTOR) 20 MG tablet      10. Primary hypertension        11. Bipolar 2 disorder (HCC)  clonazePAM (KlonoPIN) 0.5 mg tablet        PDMP:  Filled  Written  ID  Drug  QTY  Days  Prescriber  RX #  Dispenser  Refill  Daily Dose*  Pymt Type      12/28/2023 12/28/2023 1 Tramadol Hcl 50 Mg Tablet 60.00 30 Er Fly [id number] [facility] ([id number]) 0 20.00 MME Medicare [place]   11/29/2023 10/26/2023 1 Clonazepam 0.5 Mg Tablet 270.00 90 Er Fly [id number] Opt (9375) 0 3.00 LME Medicare [place]   11/17/2023 11/17/2023 1 Hydrocodone-Acetamin 5-325 Mg 14.00 7 Sa Bog [id number] Wal (5259) 0 10.00 MME Medicare [place]   11/14/2023 11/14/2023 2 Tramadol Hcl 50 Mg Tablet 60.00 30 Er Timo [id number] Omn (4693) 0 20.00 MME Private Pay [place]   11/14/2023 11/14/2023 2 Clonazepam 0.125 Mg Odt 90.00 30 Er Timo [id number] Omn (0452) 0 0.75  E Private Pay [place]   10/31/2023 10/31/2023 1 Tramadol Hcl 50 Mg Tablet 60.00 30 Er Fly [id number] Ortega ([id number])         Pre-Op Evaluation Assessment  63 y.o. female with planned surgery:  L TKR.    Known risk factors for perioperative complications: None.    Current medications which may produce withdrawal symptoms if withheld perioperatively: Tramadol.    Pre-Op Evaluation Plan  1. Further preoperative workup as follows:   - None; no further preoperative work-up is required    2. Medication Management/Recommendations:   - Patient has been instructed to avoid herbs or non-directed vitamins the week prior to surgery to ensure no drug interactions with perioperative surgical and anesthetic medications.  - Patient should continue antihypertensive medications up through and including the day of surgery.   - Patient should continue his statin medication up through and including the day of surgery.  - Patient has been instructed to avoid aspirin containing medications or non-steroidal anti-inflammatory drugs for the week preceding surgery.    3. Prophylaxis for cardiac events with perioperative beta-blockers: not indicated.    4. Patient requires further consultation with: None    Clearance  Patient is CLEARED for surgery without any additional cardiac testing.     Yanna Barba DO  [facility]ARE [address]  [facility] [address] [address]  Phone#  [phone]  Fax#  [phone]

## 2024-02-02 ENCOUNTER — OFFICE VISIT (OUTPATIENT)
Dept: PSYCHIATRY | Facility: CLINIC | Age: 64
End: 2024-02-02
Payer: MEDICARE

## 2024-02-02 DIAGNOSIS — F42.9 OBSESSIVE-COMPULSIVE DISORDER, UNSPECIFIED TYPE: ICD-10-CM

## 2024-02-02 DIAGNOSIS — F31.2 BIPOLAR I DISORDER, CURRENT OR MOST RECENT EPISODE MANIC, SEVERE WITH MOOD-INCONGRUENT PSYCHOTIC FEATURES (HCC): ICD-10-CM

## 2024-02-02 DIAGNOSIS — F31.13 BIPOLAR I DISORDER, CURRENT OR MOST RECENT EPISODE MANIC, SEVERE WITH MIXED FEATURES (HCC): ICD-10-CM

## 2024-02-02 DIAGNOSIS — Z79.899 ENCOUNTER FOR LONG-TERM (CURRENT) USE OF OTHER MEDICATIONS: Primary | ICD-10-CM

## 2024-02-02 PROCEDURE — 99214 OFFICE O/P EST MOD 30 MIN: CPT | Performed by: PSYCHIATRY & NEUROLOGY

## 2024-02-02 RX ORDER — ACETAMINOPHEN 650 MG/1
650 SUPPOSITORY RECTAL EVERY 4 HOURS PRN
COMMUNITY
End: 2024-02-08

## 2024-02-02 RX ORDER — LAMOTRIGINE 200 MG/1
TABLET ORAL
Qty: 180 TABLET | Refills: 0 | Status: SHIPPED | OUTPATIENT
Start: 2024-02-02

## 2024-02-02 RX ORDER — QUETIAPINE FUMARATE 400 MG/1
TABLET, FILM COATED ORAL
Qty: 90 TABLET | Refills: 0 | Status: SHIPPED | OUTPATIENT
Start: 2024-02-02

## 2024-02-02 RX ORDER — FLUVOXAMINE MALEATE 100 MG
TABLET ORAL
Qty: 270 TABLET | Refills: 1 | Status: SHIPPED | OUTPATIENT
Start: 2024-02-02

## 2024-02-04 ENCOUNTER — NURSE TRIAGE (OUTPATIENT)
Dept: OTHER | Facility: OTHER | Age: 64
End: 2024-02-04

## 2024-02-05 ENCOUNTER — TELEPHONE (OUTPATIENT)
Dept: OBGYN CLINIC | Facility: HOSPITAL | Age: 64
End: 2024-02-05

## 2024-02-05 ENCOUNTER — ANESTHESIA (OUTPATIENT)
Dept: PERIOP | Facility: HOSPITAL | Age: 64
DRG: 469 | End: 2024-02-05
Payer: MEDICARE

## 2024-02-05 ENCOUNTER — HOSPITAL ENCOUNTER (INPATIENT)
Facility: HOSPITAL | Age: 64
LOS: 3 days | DRG: 469 | End: 2024-02-08
Attending: ORTHOPAEDIC SURGERY | Admitting: ORTHOPAEDIC SURGERY
Payer: MEDICARE

## 2024-02-05 DIAGNOSIS — M17.12 PRIMARY OSTEOARTHRITIS OF LEFT KNEE: Primary | ICD-10-CM

## 2024-02-05 LAB
ANION GAP SERPL CALCULATED.3IONS-SCNC: 12 MMOL/L
BUN SERPL-MCNC: 30 MG/DL (ref 5–25)
CALCIUM SERPL-MCNC: 9.8 MG/DL (ref 8.4–10.2)
CHLORIDE SERPL-SCNC: 111 MMOL/L (ref 96–108)
CO2 SERPL-SCNC: 19 MMOL/L (ref 21–32)
CREAT SERPL-MCNC: 2.55 MG/DL (ref 0.6–1.3)
GFR SERPL CREATININE-BSD FRML MDRD: 19 ML/MIN/1.73SQ M
GLUCOSE SERPL-MCNC: 107 MG/DL (ref 65–140)
POTASSIUM SERPL-SCNC: 4.7 MMOL/L (ref 3.5–5.3)
POTASSIUM SERPL-SCNC: 4.7 MMOL/L (ref 3.5–5.3)
SODIUM SERPL-SCNC: 142 MMOL/L (ref 135–147)

## 2024-02-05 PROCEDURE — C1776 JOINT DEVICE (IMPLANTABLE): HCPCS | Performed by: ORTHOPAEDIC SURGERY

## 2024-02-05 PROCEDURE — 80048 BASIC METABOLIC PNL TOTAL CA: CPT | Performed by: ORTHOPAEDIC SURGERY

## 2024-02-05 PROCEDURE — 97167 OT EVAL HIGH COMPLEX 60 MIN: CPT

## 2024-02-05 PROCEDURE — 97163 PT EVAL HIGH COMPLEX 45 MIN: CPT

## 2024-02-05 PROCEDURE — C1713 ANCHOR/SCREW BN/BN,TIS/BN: HCPCS | Performed by: ORTHOPAEDIC SURGERY

## 2024-02-05 PROCEDURE — C9290 INJ, BUPIVACAINE LIPOSOME: HCPCS | Performed by: ANESTHESIOLOGY

## 2024-02-05 PROCEDURE — 27447 TOTAL KNEE ARTHROPLASTY: CPT | Performed by: ORTHOPAEDIC SURGERY

## 2024-02-05 PROCEDURE — 27447 TOTAL KNEE ARTHROPLASTY: CPT | Performed by: PHYSICIAN ASSISTANT

## 2024-02-05 PROCEDURE — 0SRD0J9 REPLACEMENT OF LEFT KNEE JOINT WITH SYNTHETIC SUBSTITUTE, CEMENTED, OPEN APPROACH: ICD-10-PCS | Performed by: ORTHOPAEDIC SURGERY

## 2024-02-05 PROCEDURE — 84132 ASSAY OF SERUM POTASSIUM: CPT | Performed by: ANESTHESIOLOGY

## 2024-02-05 DEVICE — ATTUNE KNEE SYSTEM FEMORAL POSTERIOR STABILIZED NARROW SIZE 6N LEFT CEMENTED
Type: IMPLANTABLE DEVICE | Site: KNEE | Status: FUNCTIONAL
Brand: ATTUNE

## 2024-02-05 DEVICE — ATTUNE PATELLA MEDIALIZED DOME 38MM CEMENTED AOX
Type: IMPLANTABLE DEVICE | Site: KNEE | Status: FUNCTIONAL
Brand: ATTUNE

## 2024-02-05 DEVICE — ATTUNE KNEE SYSTEM TIBIAL BASE ROTATING PLATFORM SIZE 5 CEMENTED
Type: IMPLANTABLE DEVICE | Site: KNEE | Status: FUNCTIONAL
Brand: ATTUNE

## 2024-02-05 DEVICE — ATTUNE KNEE SYSTEM TIBIAL INSERT ROTATING PLATFORM POSTERIOR STABILIZED 6 6MM AOX
Type: IMPLANTABLE DEVICE | Site: KNEE | Status: FUNCTIONAL
Brand: ATTUNE

## 2024-02-05 DEVICE — SMARTSET HIGH PERFORMANCE MV MEDIUM VISCOSITY BONE CEMENT 40G
Type: IMPLANTABLE DEVICE | Site: KNEE | Status: FUNCTIONAL
Brand: SMARTSET

## 2024-02-05 RX ORDER — SODIUM CHLORIDE, SODIUM LACTATE, POTASSIUM CHLORIDE, CALCIUM CHLORIDE 600; 310; 30; 20 MG/100ML; MG/100ML; MG/100ML; MG/100ML
50 INJECTION, SOLUTION INTRAVENOUS CONTINUOUS
Status: DISCONTINUED | OUTPATIENT
Start: 2024-02-05 | End: 2024-02-05

## 2024-02-05 RX ORDER — FLUVOXAMINE MALEATE 50 MG/1
100 TABLET, COATED ORAL DAILY
Status: DISCONTINUED | OUTPATIENT
Start: 2024-02-06 | End: 2024-02-08 | Stop reason: HOSPADM

## 2024-02-05 RX ORDER — CLONAZEPAM 0.5 MG/1
0.5 TABLET ORAL 3 TIMES DAILY
Status: DISCONTINUED | OUTPATIENT
Start: 2024-02-05 | End: 2024-02-08 | Stop reason: HOSPADM

## 2024-02-05 RX ORDER — QUETIAPINE FUMARATE 300 MG/1
300 TABLET, FILM COATED ORAL DAILY
Status: DISCONTINUED | OUTPATIENT
Start: 2024-02-06 | End: 2024-02-08 | Stop reason: HOSPADM

## 2024-02-05 RX ORDER — ALBUTEROL SULFATE 90 UG/1
2 AEROSOL, METERED RESPIRATORY (INHALATION) EVERY 6 HOURS PRN
Status: DISCONTINUED | OUTPATIENT
Start: 2024-02-05 | End: 2024-02-08 | Stop reason: HOSPADM

## 2024-02-05 RX ORDER — ATORVASTATIN CALCIUM 40 MG/1
40 TABLET, FILM COATED ORAL
Status: DISCONTINUED | OUTPATIENT
Start: 2024-02-05 | End: 2024-02-08 | Stop reason: HOSPADM

## 2024-02-05 RX ORDER — TRANEXAMIC ACID 10 MG/ML
INJECTION, SOLUTION INTRAVENOUS AS NEEDED
Status: DISCONTINUED | OUTPATIENT
Start: 2024-02-05 | End: 2024-02-05

## 2024-02-05 RX ORDER — PROPOFOL 10 MG/ML
INJECTION, EMULSION INTRAVENOUS AS NEEDED
Status: DISCONTINUED | OUTPATIENT
Start: 2024-02-05 | End: 2024-02-05

## 2024-02-05 RX ORDER — BUDESONIDE AND FORMOTEROL FUMARATE DIHYDRATE 160; 4.5 UG/1; UG/1
2 AEROSOL RESPIRATORY (INHALATION) 2 TIMES DAILY
Status: DISCONTINUED | OUTPATIENT
Start: 2024-02-05 | End: 2024-02-08 | Stop reason: HOSPADM

## 2024-02-05 RX ORDER — BUPIVACAINE HYDROCHLORIDE 5 MG/ML
INJECTION, SOLUTION EPIDURAL; INTRACAUDAL
Status: COMPLETED | OUTPATIENT
Start: 2024-02-05 | End: 2024-02-05

## 2024-02-05 RX ORDER — ASCORBIC ACID 500 MG
500 TABLET ORAL 2 TIMES DAILY
Status: DISCONTINUED | OUTPATIENT
Start: 2024-02-05 | End: 2024-02-08 | Stop reason: HOSPADM

## 2024-02-05 RX ORDER — SODIUM CHLORIDE, SODIUM LACTATE, POTASSIUM CHLORIDE, CALCIUM CHLORIDE 600; 310; 30; 20 MG/100ML; MG/100ML; MG/100ML; MG/100ML
75 INJECTION, SOLUTION INTRAVENOUS CONTINUOUS
Status: DISCONTINUED | OUTPATIENT
Start: 2024-02-05 | End: 2024-02-08 | Stop reason: HOSPADM

## 2024-02-05 RX ORDER — ONDANSETRON 2 MG/ML
4 INJECTION INTRAMUSCULAR; INTRAVENOUS ONCE AS NEEDED
Status: DISCONTINUED | OUTPATIENT
Start: 2024-02-05 | End: 2024-02-05 | Stop reason: HOSPADM

## 2024-02-05 RX ORDER — PROPOFOL 10 MG/ML
INJECTION, EMULSION INTRAVENOUS CONTINUOUS PRN
Status: DISCONTINUED | OUTPATIENT
Start: 2024-02-05 | End: 2024-02-05

## 2024-02-05 RX ORDER — TRAMADOL HYDROCHLORIDE 50 MG/1
50 TABLET ORAL EVERY 12 HOURS PRN
Status: DISCONTINUED | OUTPATIENT
Start: 2024-02-05 | End: 2024-02-06

## 2024-02-05 RX ORDER — CEFAZOLIN SODIUM 2 G/50ML
SOLUTION INTRAVENOUS AS NEEDED
Status: DISCONTINUED | OUTPATIENT
Start: 2024-02-05 | End: 2024-02-05

## 2024-02-05 RX ORDER — ASPIRIN 81 MG/1
81 TABLET, CHEWABLE ORAL 2 TIMES DAILY
Qty: 60 TABLET | Refills: 0 | Status: SHIPPED | OUTPATIENT
Start: 2024-02-05

## 2024-02-05 RX ORDER — TRANEXAMIC ACID 10 MG/ML
1000 INJECTION, SOLUTION INTRAVENOUS ONCE
Status: COMPLETED | OUTPATIENT
Start: 2024-02-05 | End: 2024-02-05

## 2024-02-05 RX ORDER — FENTANYL CITRATE 50 UG/ML
INJECTION, SOLUTION INTRAMUSCULAR; INTRAVENOUS
Status: COMPLETED | OUTPATIENT
Start: 2024-02-05 | End: 2024-02-05

## 2024-02-05 RX ORDER — CHLORHEXIDINE GLUCONATE 4 G/100ML
SOLUTION TOPICAL DAILY PRN
Status: DISCONTINUED | OUTPATIENT
Start: 2024-02-05 | End: 2024-02-05

## 2024-02-05 RX ORDER — HALOPERIDOL 5 MG/1
10 TABLET ORAL 2 TIMES DAILY
Status: DISCONTINUED | OUTPATIENT
Start: 2024-02-05 | End: 2024-02-08 | Stop reason: HOSPADM

## 2024-02-05 RX ORDER — BUPIVACAINE HYDROCHLORIDE 7.5 MG/ML
INJECTION, SOLUTION INTRASPINAL AS NEEDED
Status: DISCONTINUED | OUTPATIENT
Start: 2024-02-05 | End: 2024-02-05

## 2024-02-05 RX ORDER — CHLORHEXIDINE GLUCONATE ORAL RINSE 1.2 MG/ML
15 SOLUTION DENTAL ONCE
Status: COMPLETED | OUTPATIENT
Start: 2024-02-05 | End: 2024-02-05

## 2024-02-05 RX ORDER — QUETIAPINE FUMARATE 200 MG/1
400 TABLET, FILM COATED ORAL
Status: DISCONTINUED | OUTPATIENT
Start: 2024-02-05 | End: 2024-02-08 | Stop reason: HOSPADM

## 2024-02-05 RX ORDER — CEFAZOLIN SODIUM 2 G/50ML
2000 SOLUTION INTRAVENOUS ONCE
Status: COMPLETED | OUTPATIENT
Start: 2024-02-05 | End: 2024-02-05

## 2024-02-05 RX ORDER — CARVEDILOL 25 MG/1
25 TABLET ORAL 2 TIMES DAILY WITH MEALS
Status: DISCONTINUED | OUTPATIENT
Start: 2024-02-05 | End: 2024-02-08 | Stop reason: HOSPADM

## 2024-02-05 RX ORDER — ONDANSETRON 2 MG/ML
4 INJECTION INTRAMUSCULAR; INTRAVENOUS EVERY 6 HOURS PRN
Status: DISCONTINUED | OUTPATIENT
Start: 2024-02-05 | End: 2024-02-08 | Stop reason: HOSPADM

## 2024-02-05 RX ORDER — CEFAZOLIN SODIUM 2 G/50ML
2000 SOLUTION INTRAVENOUS EVERY 8 HOURS
Status: COMPLETED | OUTPATIENT
Start: 2024-02-05 | End: 2024-02-05

## 2024-02-05 RX ORDER — ACETAMINOPHEN 500 MG
1000 TABLET ORAL EVERY 8 HOURS
Qty: 60 TABLET | Refills: 0 | Status: SHIPPED | OUTPATIENT
Start: 2024-02-05

## 2024-02-05 RX ORDER — MELATONIN
5000 DAILY
Status: DISCONTINUED | OUTPATIENT
Start: 2024-02-05 | End: 2024-02-08 | Stop reason: HOSPADM

## 2024-02-05 RX ORDER — OXYCODONE HYDROCHLORIDE 5 MG/1
5 TABLET ORAL EVERY 4 HOURS PRN
Qty: 30 TABLET | Refills: 0 | Status: SHIPPED | OUTPATIENT
Start: 2024-02-05 | End: 2024-02-06

## 2024-02-05 RX ORDER — LIDOCAINE HYDROCHLORIDE 10 MG/ML
INJECTION, SOLUTION EPIDURAL; INFILTRATION; INTRACAUDAL; PERINEURAL AS NEEDED
Status: DISCONTINUED | OUTPATIENT
Start: 2024-02-05 | End: 2024-02-05

## 2024-02-05 RX ORDER — LIDOCAINE HYDROCHLORIDE 10 MG/ML
0.5 INJECTION, SOLUTION EPIDURAL; INFILTRATION; INTRACAUDAL; PERINEURAL ONCE AS NEEDED
Status: DISCONTINUED | OUTPATIENT
Start: 2024-02-05 | End: 2024-02-05

## 2024-02-05 RX ORDER — FLUVOXAMINE MALEATE 50 MG/1
200 TABLET, COATED ORAL
Status: DISCONTINUED | OUTPATIENT
Start: 2024-02-05 | End: 2024-02-08 | Stop reason: HOSPADM

## 2024-02-05 RX ORDER — OXYCODONE HYDROCHLORIDE 5 MG/1
5 TABLET ORAL EVERY 4 HOURS PRN
Status: DISCONTINUED | OUTPATIENT
Start: 2024-02-05 | End: 2024-02-06 | Stop reason: SDUPTHER

## 2024-02-05 RX ORDER — ALBUTEROL SULFATE 2.5 MG/3ML
2.5 SOLUTION RESPIRATORY (INHALATION) ONCE AS NEEDED
Status: DISCONTINUED | OUTPATIENT
Start: 2024-02-05 | End: 2024-02-05 | Stop reason: HOSPADM

## 2024-02-05 RX ORDER — HYDROMORPHONE HCL IN WATER/PF 6 MG/30 ML
0.2 PATIENT CONTROLLED ANALGESIA SYRINGE INTRAVENOUS
Status: DISCONTINUED | OUTPATIENT
Start: 2024-02-05 | End: 2024-02-05 | Stop reason: HOSPADM

## 2024-02-05 RX ORDER — OXYCODONE HYDROCHLORIDE 10 MG/1
10 TABLET ORAL EVERY 4 HOURS PRN
Status: DISCONTINUED | OUTPATIENT
Start: 2024-02-05 | End: 2024-02-08 | Stop reason: HOSPADM

## 2024-02-05 RX ORDER — FOLIC ACID 1 MG/1
1 TABLET ORAL DAILY
Status: DISCONTINUED | OUTPATIENT
Start: 2024-02-05 | End: 2024-02-08 | Stop reason: HOSPADM

## 2024-02-05 RX ORDER — FERROUS SULFATE 325(65) MG
325 TABLET ORAL 2 TIMES DAILY WITH MEALS
Status: DISCONTINUED | OUTPATIENT
Start: 2024-02-05 | End: 2024-02-08 | Stop reason: HOSPADM

## 2024-02-05 RX ORDER — MIDAZOLAM HYDROCHLORIDE 2 MG/2ML
INJECTION, SOLUTION INTRAMUSCULAR; INTRAVENOUS
Status: COMPLETED | OUTPATIENT
Start: 2024-02-05 | End: 2024-02-05

## 2024-02-05 RX ORDER — SODIUM CHLORIDE 9 MG/ML
125 INJECTION, SOLUTION INTRAVENOUS CONTINUOUS
Status: DISCONTINUED | OUTPATIENT
Start: 2024-02-05 | End: 2024-02-05

## 2024-02-05 RX ORDER — DEXAMETHASONE SODIUM PHOSPHATE 10 MG/ML
INJECTION, SOLUTION INTRAMUSCULAR; INTRAVENOUS AS NEEDED
Status: DISCONTINUED | OUTPATIENT
Start: 2024-02-05 | End: 2024-02-05

## 2024-02-05 RX ORDER — ENOXAPARIN SODIUM 100 MG/ML
30 INJECTION SUBCUTANEOUS DAILY
Status: DISCONTINUED | OUTPATIENT
Start: 2024-02-05 | End: 2024-02-08 | Stop reason: HOSPADM

## 2024-02-05 RX ORDER — FENTANYL CITRATE/PF 50 MCG/ML
25 SYRINGE (ML) INJECTION
Status: DISCONTINUED | OUTPATIENT
Start: 2024-02-05 | End: 2024-02-05 | Stop reason: HOSPADM

## 2024-02-05 RX ORDER — PANTOPRAZOLE SODIUM 40 MG/1
40 TABLET, DELAYED RELEASE ORAL
Status: DISCONTINUED | OUTPATIENT
Start: 2024-02-06 | End: 2024-02-08 | Stop reason: HOSPADM

## 2024-02-05 RX ORDER — LAMOTRIGINE 100 MG/1
200 TABLET ORAL 2 TIMES DAILY
Status: DISCONTINUED | OUTPATIENT
Start: 2024-02-05 | End: 2024-02-08 | Stop reason: HOSPADM

## 2024-02-05 RX ORDER — ONDANSETRON 2 MG/ML
INJECTION INTRAMUSCULAR; INTRAVENOUS AS NEEDED
Status: DISCONTINUED | OUTPATIENT
Start: 2024-02-05 | End: 2024-02-05

## 2024-02-05 RX ADMIN — LAMOTRIGINE 200 MG: 100 TABLET ORAL at 17:38

## 2024-02-05 RX ADMIN — CHLORHEXIDINE GLUCONATE 15 ML: 1.2 SOLUTION ORAL at 06:43

## 2024-02-05 RX ADMIN — CEFAZOLIN SODIUM 2000 MG: 2 SOLUTION INTRAVENOUS at 07:37

## 2024-02-05 RX ADMIN — SODIUM CHLORIDE, SODIUM LACTATE, POTASSIUM CHLORIDE, AND CALCIUM CHLORIDE 75 ML/HR: .6; .31; .03; .02 INJECTION, SOLUTION INTRAVENOUS at 21:23

## 2024-02-05 RX ADMIN — Medication 5000 UNITS: at 15:33

## 2024-02-05 RX ADMIN — MIDAZOLAM 2 MG: 1 INJECTION INTRAMUSCULAR; INTRAVENOUS at 07:11

## 2024-02-05 RX ADMIN — PROPOFOL 50 MG: 10 INJECTION, EMULSION INTRAVENOUS at 07:35

## 2024-02-05 RX ADMIN — PROPOFOL 70 MCG/KG/MIN: 10 INJECTION, EMULSION INTRAVENOUS at 07:35

## 2024-02-05 RX ADMIN — FERROUS SULFATE TAB 325 MG (65 MG ELEMENTAL FE) 325 MG: 325 (65 FE) TAB at 15:33

## 2024-02-05 RX ADMIN — ONDANSETRON 4 MG: 2 INJECTION INTRAMUSCULAR; INTRAVENOUS at 07:35

## 2024-02-05 RX ADMIN — ONDANSETRON 4 MG: 2 INJECTION INTRAMUSCULAR; INTRAVENOUS at 22:59

## 2024-02-05 RX ADMIN — FENTANYL CITRATE 50 MCG: 50 INJECTION, SOLUTION INTRAMUSCULAR; INTRAVENOUS at 07:12

## 2024-02-05 RX ADMIN — OXYCODONE HYDROCHLORIDE AND ACETAMINOPHEN 500 MG: 500 TABLET ORAL at 17:38

## 2024-02-05 RX ADMIN — QUETIAPINE 400 MG: 200 TABLET ORAL at 21:17

## 2024-02-05 RX ADMIN — LIDOCAINE HYDROCHLORIDE 50 MG: 10 INJECTION, SOLUTION EPIDURAL; INFILTRATION; INTRACAUDAL; PERINEURAL at 07:35

## 2024-02-05 RX ADMIN — TRANEXAMIC ACID 1000 MG: 10 INJECTION, SOLUTION INTRAVENOUS at 06:43

## 2024-02-05 RX ADMIN — FOLIC ACID 1 MG: 1 TABLET ORAL at 15:33

## 2024-02-05 RX ADMIN — CLONAZEPAM 0.5 MG: 0.5 TABLET ORAL at 15:36

## 2024-02-05 RX ADMIN — SODIUM CHLORIDE, SODIUM LACTATE, POTASSIUM CHLORIDE, AND CALCIUM CHLORIDE 50 ML/HR: .6; .31; .03; .02 INJECTION, SOLUTION INTRAVENOUS at 06:42

## 2024-02-05 RX ADMIN — OXYCODONE HYDROCHLORIDE 10 MG: 10 TABLET ORAL at 15:42

## 2024-02-05 RX ADMIN — CLONAZEPAM 0.5 MG: 0.5 TABLET ORAL at 21:17

## 2024-02-05 RX ADMIN — TRANEXAMIC ACID 1000 MG: 10 INJECTION, SOLUTION INTRAVENOUS at 07:25

## 2024-02-05 RX ADMIN — ATORVASTATIN CALCIUM 40 MG: 40 TABLET, FILM COATED ORAL at 15:33

## 2024-02-05 RX ADMIN — CEFAZOLIN SODIUM 2000 MG: 2 SOLUTION INTRAVENOUS at 11:24

## 2024-02-05 RX ADMIN — BUPIVACAINE HYDROCHLORIDE IN DEXTROSE 1.6 ML: 7.5 INJECTION, SOLUTION SUBARACHNOID at 07:33

## 2024-02-05 RX ADMIN — BUPIVACAINE 20 ML: 13.3 INJECTION, SUSPENSION, LIPOSOMAL INFILTRATION at 07:12

## 2024-02-05 RX ADMIN — CEFAZOLIN SODIUM 2000 MG: 2 SOLUTION INTRAVENOUS at 06:43

## 2024-02-05 RX ADMIN — BUPIVACAINE HYDROCHLORIDE 7 ML: 5 INJECTION, SOLUTION EPIDURAL; INTRACAUDAL; PERINEURAL at 07:11

## 2024-02-05 RX ADMIN — CARVEDILOL 25 MG: 25 TABLET, FILM COATED ORAL at 15:45

## 2024-02-05 RX ADMIN — SODIUM CHLORIDE, SODIUM LACTATE, POTASSIUM CHLORIDE, AND CALCIUM CHLORIDE 75 ML/HR: .6; .31; .03; .02 INJECTION, SOLUTION INTRAVENOUS at 11:20

## 2024-02-05 RX ADMIN — OXYCODONE HYDROCHLORIDE 10 MG: 10 TABLET ORAL at 21:17

## 2024-02-05 RX ADMIN — DEXAMETHASONE SODIUM PHOSPHATE 10 MG: 10 INJECTION, SOLUTION INTRAMUSCULAR; INTRAVENOUS at 07:35

## 2024-02-05 RX ADMIN — OXYCODONE HYDROCHLORIDE 10 MG: 10 TABLET ORAL at 11:07

## 2024-02-05 RX ADMIN — ENOXAPARIN SODIUM 30 MG: 30 INJECTION SUBCUTANEOUS at 22:04

## 2024-02-05 RX ADMIN — HALOPERIDOL 10 MG: 5 TABLET ORAL at 17:38

## 2024-02-05 RX ADMIN — FLUVOXAMINE MALEATE 200 MG: 50 TABLET, FILM COATED ORAL at 21:17

## 2024-02-05 NOTE — ANESTHESIA PREPROCEDURE EVALUATION
Procedure:  ARTHROPLASTY KNEE TOTAL SAME DAY (Left: Knee)    Relevant Problems   CARDIO   (+) Cardiac murmur   (+) Essential hypertension   (+) Hypercholesteremia   (+) Moderate aortic stenosis   (+) Primary hypertension      ENDO   (+) Hyperparathyroidism (HCC)   (+) Secondary renal hyperparathyroidism (HCC)      GI/HEPATIC   (+) GERD (gastroesophageal reflux disease)   (+) Idiopathic chronic pancreatitis (HCC)      /RENAL   (+) CKD (chronic kidney disease) stage 4, GFR 15-29 ml/min (HCC)   (+) End stage renal disease (HCC)   (+) Renal cyst      MUSCULOSKELETAL   (+) Chronic back pain   (+) Primary localized osteoarthritis of knees, bilateral   (+) Primary osteoarthritis of left knee   (+) Primary osteoarthritis of right knee      NEURO/PSYCH   (+) Anxiety   (+) Chronic back pain   (+) Continuous opioid dependence (HCC)   (+) Generalized anxiety disorder   (+) Obsessive compulsive disorder   (+) Panic disorder with agoraphobia      PULMONARY   (+) Moderate persistent asthma without complication   (+) Shortness of breath           Left Ventricle: Left ventricular cavity size is normal. Wall thickness is mildly increased. The left ventricular ejection fraction is 65%. Systolic function is normal. Wall motion is normal. Diastolic function is moderately abnormal, consistent with grade II (pseudonormal) relaxation.    Left Atrium: The atrium is mildly dilated.    Aortic Valve: The aortic valve is trileaflet. The leaflets are moderately calcified. There is moderately reduced mobility. There is trace regurgitation. There is moderate stenosis. The aortic valve mean gradient is 14.0 mmHg. The aortic valve area is 1.24 cm2.    Mitral Valve: There is mild annular calcification. There is trace regurgitation.    Tricuspid Valve: There is mild regurgitation.     Physical Exam    Airway    Mallampati score: II  TM Distance: >3 FB  Neck ROM: full     Dental    upper dentures and lower  dentures    Cardiovascular      Pulmonary      Other Findings  post-pubertal.      Anesthesia Plan  ASA Score- 3     Anesthesia Type- spinal with ASA Monitors.         Additional Monitors:     Airway Plan:     Comment: Adductor block with exparel.       Plan Factors-    Chart reviewed. EKG reviewed. Imaging results reviewed. Existing labs reviewed. Patient summary reviewed.    Patient is not a current smoker.              Induction- intravenous.    Postoperative Plan- Plan for postoperative opioid use.     Informed Consent- Anesthetic plan and risks discussed with patient.  I personally reviewed this patient with the CRNA. Discussed and agreed on the Anesthesia Plan with the CRNA..

## 2024-02-05 NOTE — PROGRESS NOTES
Pastoral Care Progress Note    2024  Patient: Ayleen Moralez : 1960  Admission Date & Time: 2024 0607  MRN: [id number] CSN: [id number]          Pt was recently admitted post-op, in good spirits.  Made brief introductory visit and prayed for peace and healing in the days ahead.

## 2024-02-05 NOTE — ANESTHESIA POSTPROCEDURE EVALUATION
Post-Op Assessment Note    CV Status:  Stable  Pain Score: 0    Pain management: adequate       Mental Status:  Alert and awake   Hydration Status:  Euvolemic   PONV Controlled:  Controlled   Airway Patency:  Patent     Post Op Vitals Reviewed: Yes    No anethesia notable event occurred.    Staff: Anesthesiologist, CRNA               BP   169/78   Temp   97.9   Pulse  92   Resp   18   SpO2   96

## 2024-02-05 NOTE — INTERVAL H&P NOTE
H&P reviewed. After examining the patient I find no changes in the patients condition since the H&P had been written.    Vitals:    02/05/24 0644   BP: 168/80   Pulse: 90   Resp: 14   Temp: 98 °F (36.7 °C)   SpO2: 92%

## 2024-02-05 NOTE — ANESTHESIA PROCEDURE NOTES
Peripheral Block    Start time: 2/5/2024 7:11 AM  Reason for block: at surgeon's request and post-op pain management  Staffing  Performed by: Timbo Delgaidllo DO  Authorized by: Timbo Delgadillo DO    Preanesthetic Checklist  Completed: patient identified, IV checked, site marked, risks and benefits discussed, surgical consent, monitors and equipment checked, pre-op evaluation and timeout performed  Peripheral Block  Patient position: supine  Prep: ChloraPrep  Patient monitoring: continuous pulse oximetry, frequent blood pressure checks and heart rate  Block type: Adductor Canal  Laterality: left  Injection technique: single-shot  Procedures: ultrasound guided, Ultrasound guidance required for the procedure to increase accuracy and safety of medication placement and decrease risk of complications.  Ultrasound permanent image savedbupivacaine (PF) (MARCAINE) 0.5 % injection 20 mL - Perineural   7 mL - 2/5/2024 7:11:00 AM  midazolam (VERSED) injection 0.5 mg - Intravenous   2 mg - 2/5/2024 7:11:00 AM  fentanyl citrate (PF) 100 MCG/2ML 50 mcg - Intravenous   50 mcg - 2/5/2024 7:12:00 AM  bupivacaine liposomal (EXPAREL) 1.3 % injection 20 mL - Perineural   20 mL - 2/5/2024 7:12:00 AM  Needle  Needle type: Stimuplex   Needle gauge: 20 G  Needle length: 4 in  Needle localization: ultrasound guidance  Catheter type: open end  Assessment  Injection assessment: frequent aspiration, negative aspiration, injected with ease, no paresthesia on injection, no symptoms of intraneural/intravenous injection, negative for heart rate change, needle tip visualized at all times and incremental injection  Paresthesia pain: none  Post-procedure:  site cleaned  patient tolerated the procedure well with no immediate complications

## 2024-02-05 NOTE — TELEPHONE ENCOUNTER
Regarding: surgery time  ----- Message from Cindy Maher sent at 2/4/2024  7:57 PM EST -----  Patient is looking for more information about her surgery coming up tomorrow morning

## 2024-02-05 NOTE — OCCUPATIONAL THERAPY NOTE
Occupational Therapy Evaluation      Ayleen SCHOFIELD Kirt    2024    Active Problems:  There are no active Hospital Problems.      Past Medical History:   Diagnosis Date    Anxiety     Anxiety disorder     Arthritis     At risk for falls     Bipolar 2 disorder (HCC)     Chronic back pain     Chronic kidney disease     Closed fracture of distal end of right fibula with routine healing 2020    COVID-19     in 2021    CVA (cerebral vascular accident) (HCC)     noted on MRI in the past    Depression     GERD (gastroesophageal reflux disease)     Hypercholesteremia     Hypernatremia     Hypertension     Hypokalemia     Idiopathic chronic pancreatitis ([facility]) 2018    Intervertebral disc disorder with radiculopathy of lumbosacral region     resolved: 2015    Kidney disease     Kidney disease     Limb alert care status     LUE-fistula    Panic attacks     Pericardial effusion     PONV (postoperative nausea and vomiting)     Psychiatric problem     Radiculitis     resolved: 2015    Secondary renal hyperparathyroidism (HCC)     Stroke ([facility])     Vitamin D deficiency        Past Surgical History:   Procedure Laterality Date    BUNIONECTOMY      Left foot     CAST APPLICATION Right 2022    Procedure: Application short-arm thumb spica splint;  Surgeon: Lyndon Victoria MD;  Location:  MAIN OR;  Service: Orthopedics    COLON SURGERY      COLONOSCOPY  2021    DILATION AND CURETTAGE OF UTERUS      INDUCED       surgically induced    ID ARTERIOVENOUS ANASTOMOSIS OPEN DIRECT Left 2019    Procedure: CREATION FISTULA ARTERIOVENOUS (AV) left wrists possible left upper;  Surgeon: Andrey Quintero MD;  Location:  MAIN OR;  Service: Vascular    [facility]    Procedure: Serge bunionectomy;  Surgeon: Munir Larkin DPM;  Location:  MAIN OR;  Service: Podiatry    [facility] Right 8/3/2020     Procedure: BUNIONECTOMY RAFAEL;  Surgeon: Munir Larkin DPM;  Location: [facility] MAIN OR;  Service: Podiatry    AZ CORRJ HLX VLGS BNCTY SESMDC PROX PHLX OSTEOT Right 9/27/2021    Procedure: BUNIONECTOMY TAMEKA, right tameka osteotomy and 2nd claw toe correction;  Surgeon: James R Lachman, MD;  Location: [facility] MAIN [facility];  Service: Orthopedics    AZ ERCP DX COLLECTION SPECIMEN BRUSHING/WASHING N/A 4/11/2018    Procedure: ENDOSCOPIC RETROGRADE CHOLANGIOPANCREATOGRAPHY (ERCP);  Surgeon: Alfredo Messina MD;  Location: [facility] MAIN OR;  Service: Gastroenterology    AZ LAPAROSCOPY PROCTOPEXY PROLAPSE N/A 7/13/2018    Procedure: ROBOTIC SIGMOID RESECTION / RECTOPEXY;  Surgeon: ELPIDIO Mcnulty MD;  Location: [facility] MAIN OR;  Service: Colorectal    AZ OPEN TREATMENT RADIAL SHAFT FRACTURE Right 10/11/2021    Procedure: OPEN REDUCTION W/ INTERNAL FIXATION (ORIF) RADIUS (WRIST), RIGHT DISTAL;  Surgeon: Riki Ma MD;  Location: [facility];  Service: Orthopedics    AZ REMOVAL IMPLANT DEEP Right 6/23/2022    Procedure: Removal of hardware volar aspect right distal radius (distal radial plate and screws);  Surgeon: Lyndon Victoria MD;  Location: [facility];  Service: Orthopedics    AZ SIGMOIDOSCOPY FLX DX W/COLLJ SPEC BR/WA IF PFRMD N/A 7/13/2018    Procedure: SIGMOIDOSCOPY FLEXIBLE;  Surgeon: ELPIDIO Mcnulty MD;  Location: [facility];  Service: Colorectal    AZ TR TDN RESTORE INTRNSC FUNCJ ALL 4 FNGRS Right 6/23/2022    Procedure: Right ring finger flexor digitorum superficialis to flexor pollicis longus tendon transfer;  Surgeon: Lyndon Victoria MD;  Location: [facility];  Service: Orthopedics    TUBAL LIGATION Bilateral 1997    US GUIDED THYROID BIOPSY  7/30/2019 02/05/24 1431   OT Last Visit   OT Visit Date 02/05/24   Note Type   Note type Evaluation   Pain Assessment   Pain Assessment Tool 0-10   Pain Score 6   Pain Location/Orientation Orientation: Left;Location: [facility]ending [facility] to [facility]   Hospital Pain Intervention(s)  Repositioned;Ambulation/increased activity   Restrictions/Precautions   Weight Bearing Precautions Per Order Yes   LLE Weight Bearing Per Order WBAT  (s/p L TKA 2/5/24)   Other Precautions Impulsive;Cognitive;Chair Alarm;Bed Alarm;WBS;Limb alert;Fall Risk;Pain   Home Living   Type of Home House   Home Layout Multi-level;Stairs to enter without rails;Bed/bath upstairs;1/2 bath on main level  (1+1 MILO. Shower on 2nd floor. Bedroom on 3rd floor.)   Bathroom Shower/Tub Tub/shower unit   Bathroom Toilet Standard   Bathroom Equipment Shower chair   Bathroom Accessibility Accessible   Home Equipment Walker   Additional Comments Pt plans to sleep on 1st floor in recliner chair with nearby 1/2 bath upon return home.   Prior Function   Level of [place] Independent with ADLs;Independent with functional mobility;Needs assistance with IADLS   Lives With Family  (sister)   Receives Help From Family  (reports her niece has been involved lately because her sister recently recovered from an arm fx.)   IADLs Independent with driving;Independent with meal prep;Independent with medication management  (states her family will help with these upon d/c)   Falls in the last 6 months (S)  5 to 10   Comments (S)  Pt's family is not able to physically assist her and they are not always home during the daytime.   Subjective   Subjective Why does it hurt?   ADL   Eating Assistance 5  Supervision/Setup   Grooming Assistance 5  Supervision/Setup   UB Bathing Assistance 5  Supervision/Setup   LB Bathing Assistance 2  Maximal Assistance   UB Dressing Assistance 4  Minimal Assistance   LB Dressing Assistance 2  Maximal Assistance   Toileting Assistance  3  Moderate Assistance   Bed Mobility   Supine to Sit 5  Supervision   Transfers   Sit to Stand 4  Minimal assistance   Additional items Assist x 1;Verbal cues;Impulsive   Stand to Sit 4  Minimal assistance   Additional items Assist x 1;Verbal cues;Impulsive   Stand pivot 4  Minimal assistance  "  Additional items Assist x 1;Verbal cues;Impulsive  (RW)   Toilet transfer 4  Minimal assistance   Additional items Assist x 1;Commode;Impulsive;Verbal cues   Additional Comments (S)  EXTENSIVE 1-step verbal cues required throughout for sequencing and safety   Functional Mobility   Functional Mobility 4  Minimal assistance   Additional Comments (S)  x1; 1 LOB requiring Mod A (pt states \"I was just doing the tango\"). Pt required step-by-step verbal cues for sequencing, often forgetting to move the RW and attempting to push it with her torso while ambulating.   Additional items Rolling walker   Activity Tolerance   Activity Tolerance Patient limited by pain   Medical Staff Made Aware PT RAMSES Basilio/Barbara   Nurse Made Aware RN Jennie NUNEZ Assessment   RUE Assessment WFL   LUE Assessment   LUE Assessment WFL   Cognition   Overall Cognitive Status Impaired   Arousal/Participation Alert   Attention Difficulty attending to directions   Orientation Level Oriented X4   Memory Decreased recall of precautions;Decreased recall of recent events;Decreased short term memory   Following Commands Follows one step commands with increased time or repetition   Comments (S)  Pt with poor safety awareness and impulsivity. Difficulty following commands and participating appropriately in conversation. Poor insight into deficits as pt doesn't understand why she has pain. Pt states she has had 6 recent falls and states she is fearful of falling at home, and that she can't manage at home right now, but also not wanting to go to a rehab and wants to go home instead. Pt with underlying psych history, but pt may require cognitive assessment for home safety.   Assessment   Limitation Decreased ADL status;Decreased UE ROM;Decreased UE strength;Decreased Safe judgement during ADL;Decreased cognition;Decreased endurance;Decreased self-care trans;Decreased high-level ADLs   Prognosis Fair   Assessment Pt is a 63 y.o. female seen for OT " evaluation at [facility], admitted 2/5/2024 w/ s/p L TKR.  OT completed extensive review of pt's medical and social history. Comorbidities affecting pt's functional performance at time of assessment include: osteoporosis, mood disorder, OCD, panic disorder, anxiety, HTN, bipolar 1 disorder, chronic back pain, obesity, osteoarthritis of bilateral knees. Personal factors affecting pt at time of IE include:steps to enter environment, limited home support, behavioral pattern, difficulty performing ADLS, difficulty performing IADLS , limited insight into deficits, compliance, and health management . Prior to admission, pt was living in 3SH, 1+1STE, with family, and was independent with ADL/IADL. Upon evaluation, pt presents to OT below baseline due to the following performance deficits: weakness, decreased strength, decreased balance, decreased tolerance, impaired attention, impaired memory, impaired sequencing, impaired problem solving, impulsivity, decreased safety awareness, increased pain, and orthopedic restrictions. Pt to benefit from continued skilled OT tx while in the hospital to address deficits as defined above and maximize level of functional independence w ADL's and functional mobility. Occupational Performance areas to address include: grooming, bathing/shower, toilet hygiene, dressing, functional mobility, and functional transfers, bed mobility . The patient's raw score on the AM-PAC Daily Activity inpatient short form is 18, standardized score is 38.66, less than 39.4. Patients at this level are likely to benefit from DC to post-acute rehabilitation services. Based on findings, pt is of high complexity, due to medical comorbidities. Pt seen as a co-eval with PT due to the patient's co-morbidities, clinically unstable presentation, and present impairments which are a regression from the patient's baseline. At this time, OT recommendations at time of discharge are level 2.   Goals   Patient Goals  less pain   Plan   Treatment Interventions ADL retraining;Functional transfer training;UE strengthening/ROM;Endurance training;Patient/family training;Cognitive reorientation;Equipment evaluation/education;Compensatory technique education;Continued evaluation;Energy conservation   Goal Expiration Date 02/15/24   OT Frequency 3-5x/wk   Discharge Recommendation   Rehab Resource Intensity Level, OT II (Moderate Resource Intensity)   AM-PAC Daily Activity Inpatient   Lower Body Dressing 2   Bathing 2   Toileting 3   Upper Body Dressing 3   Grooming 4   Eating 4   Daily Activity Raw Score 18   Daily Activity Standardized Score (Calc for Raw Score >=11) 38.66     Pt will achieve the following goals within 10 days.    *Pt will complete grooming with independence.    *Pt will complete UB bathing and dressing with independence.    *Pt will complete LB bathing and dressing with modified independence .    *Pt will complete toileting (hygiene and clothing management) with modified independence    *Pt will complete bed mobility with independence, with bed flat and no side rail to prep for purposeful tasks    *Pt will perform functional transfers with modified independence in order to complete ADL routine.    *Pt will increase standing tolerance to 3-5+ minutes in order to complete ADL routine.    *Pt will complete item retrieval and light home management with supervision while demonstrating good safety.    *Pt will demonstrate increased activity tolerance in order to complete ADL routine.    *Pt will participate in cognitive assessment to determine level of safety for returning home    *Pt will participate in UE therapeutic exercise in order to maximize strength for ADL transfers.    *Pt will sit on EOB for 10+ minutes for increased safety with seated activity tolerance during ADL tasks.    *Pt will identify 3-5 fall risks to ensure safety upon discharge.    Zaira Palmer MS, OTR/L

## 2024-02-05 NOTE — DISCHARGE INSTR - AVS FIRST PAGE
Dr. Riki Ma MD  Department of Orthopaedic Surgery      [address], [address]  [place] [id number] [phone]                                        PATIENT INSTRUCTIONS AFTER TOTAL KNEE REPLACEMENT/REVISION    Diet: You may resume your normal diet.  It is important to maintain a healthy, balanced diet.  Include plenty of fluids, as pain medicines tend to cause constipation.    Medications  Pain Medications: A prescription for pain medication has been provided to upon your hospital discharge.  Your goal is reduce your pain medication gradually over the next 4-6 weeks. Take your pain medicine with food to avoid stomach discomfort.  Please allow at least 24-48 hours (Monday through Friday) from the time of your request to the time a will need the prescription.  Constipation:   To avoid constipation, take an over-the-counter stool softener (i.e. docusate sodium) twice daily such as docusate sodium or Senna S twice daily and Miralax laxative once daily while on pain medication. If the combination does not alleviate your symptoms after 3 days, use a rectal suppository such as dulcolax.      Blood Clot Prevention as below:   Aspirin 81 mg TWICE daily x 6 weeks                   Activity  Rest Periods: Gradually increase your activity on a daily basis.  The amount of time you spend out of bed and the number and distance of your walks should gradually increase each day.  Between activities such as walking, meals, exercises, etc., take a rest period for approximately 1 hour in the morning, afternoon and evening. Limit sitting to 1 hour intervals for the next 4 - 6 weeks.  Weight-bearing:  You may put as much weight as is comfortable on your operative leg with activity.  Use a walker or crutches while walking for at least 3 weeks.  At that time, it is advised to use a cane until you are able to apply full weight to the operated leg.  Impact Loading: Low-impact activities  such as golf, stationary bicycling and slow dancing may begin in 6 weeks, while swimming is allowed at 4 weeks after surgery.  All activities involving quick starts and stops, or impact loading, such as tennis, should be avoided to lower your risk of early loosening of the prosthesis.  Bathing: Your sticky dressing is waterproof and can get wet in the shower.  Driving:  You should not drive until approximately 4 weeks after surgery.  While you are traveling as a passenger for the first 4 weeks following surgery, it is advised that you get out of the car at least hourly and take a short walk.  Returning to work: The decision to return to work will be based on the type of work you do, your physical stamina, and whether you have other medical conditions.  We recommend that you avoid making any major changes in your work or long-term plans until your recovery is complete.    Wound Care  You can remove the ace wrap and soft padding tomorrow. Keep sticky dressing on until seen in office. It can get wet in the shower.   No creams or ointments on the incision for 4 weeks.   Your incision may be warm, itchy, and slightly red for several weeks after surgery.  Excessive redness, soreness, or drainage from the incision area should be reported to our office.     Common Problems  Leg & ankle swelling: You may have some swelling in your operated leg that should gradually decrease.  If swelling occurs, lie down, elevate your legs, and rest. Use compression stocking during the day and remove at nighttime. Ice and elevate the knee multiple times a day.  Pain:  Pain may be a result of over-activity.  When you are in pain, sit or lie down, elevated your legs, and rest.  If the pain does not subside, take the pain medication prescribed for you.  Pain is a protective mechanism that helps to prevent over-usage and should not be ignored.    Return Appointments:    You should have a scheduled appointment for followup.  If you do not  already have a followup appointment scheduled, please call the office at [phone] to schedule an appointment.    Call our office if you have:  Temperature of 101o or higher  Drainage from your incision  Increasing redness around your incision  Increasing  pain around the incision, unrelieved by pain medication  Excessive calf or thigh pain & swelling that does not go away with elevation and rest.     ANTIBIOTIC PROPHYLAXIS AFTER TOTAL JOINT REPLACEMENT  For protection against the remote possibility of blood-borne bacteria, carried from the mouth during a dental procedure, creating an infection in a total joint replacement, a combined task force of American Academy of Orthopaedic Surgeons and the American Dental Association has made the following guideline recommendations:    Following total joint replacement, all patients are advised to take an antibiotic regimen for the following dental procedures AS LIFETIME THERAPY:  Prophylactic cleaning of teeth or implants  Intraligamentary local anesthetic injections  Periodontal procedures  Root canal procedures  Dental extractions  Dental implant procedures  Implantation of avulsed teeth  Initial placement of orthodontic bands    The recommended antibiotic regimen (if not allergic to penicillin) is:  Amoxicillin, Cephalexin (e.g. Keflex), or Cephradine two (2.0) grams orally one (1) hour prior to the dental procedure.    For patients with a penicillin allergy, the recommended antibiotic is:  Clindamycin (e.g. Cleocin) 600 mg orally one hour prior to the dental procedure.    Antibiotic prophylaxis is not warranted for dental procedures for patients with previously placed orthopedic pins, plates or screws.    The above recommendations are considered minimum guidelines.  Your doctor and/or dentist are ultimately responsible for making individual treatment recommendations to you based on their clinical judgment.

## 2024-02-05 NOTE — PLAN OF CARE
Problem: OCCUPATIONAL THERAPY ADULT  Goal: Performs self-care activities at highest level of function for planned discharge setting.  See evaluation for individualized goals.  Description:   Outcome: Progressing  Note: Limitation: Decreased ADL status, Decreased UE ROM, Decreased UE strength, Decreased Safe judgement during ADL, Decreased cognition, Decreased endurance, Decreased self-care trans, Decreased high-level ADLs  Prognosis: Fair  Assessment: Pt is a 63 y.o. female seen for OT evaluation at [facility], admitted 2/5/2024 w/ s/p L TKR.  OT completed extensive review of pt's medical and social history. Comorbidities affecting pt's functional performance at time of assessment include: osteoporosis, mood disorder, OCD, panic disorder, anxiety, HTN, bipolar 1 disorder, chronic back pain, obesity, osteoarthritis of bilateral knees. Personal factors affecting pt at time of IE include:steps to enter environment, limited home support, behavioral pattern, difficulty performing ADLS, difficulty performing IADLS , limited insight into deficits, compliance, and health management . Prior to admission, pt was living in 3S, 1+1STE, with family, and was independent with ADL/IADL. Upon evaluation, pt presents to OT below baseline due to the following performance deficits: weakness, decreased strength, decreased balance, decreased tolerance, impaired attention, impaired memory, impaired sequencing, impaired problem solving, impulsivity, decreased safety awareness, increased pain, and orthopedic restrictions. Pt to benefit from continued skilled OT tx while in the hospital to address deficits as defined above and maximize level of functional independence w ADL's and functional mobility. Occupational Performance areas to address include: grooming, bathing/shower, toilet hygiene, dressing, functional mobility, and functional transfers, bed mobility . The patient's raw score on the AM-PAC Daily Activity inpatient short  form is 18, standardized score is 38.66, less than 39.4. Patients at this level are likely to benefit from DC to post-acute rehabilitation services. Based on findings, pt is of high complexity, due to medical comorbidities. Pt seen as a co-eval with PT due to the patient's co-morbidities, clinically unstable presentation, and present impairments which are a regression from the patient's baseline. At this time, OT recommendations at time of discharge are level 2.     Rehab Resource Intensity Level, OT: II (Moderate Resource Intensity)

## 2024-02-05 NOTE — PLAN OF CARE
Problem: PHYSICAL THERAPY ADULT  Goal: Performs mobility at highest level of function for planned discharge setting.  See evaluation for individualized goals.  Description: Treatment/Interventions: Functional transfer training, LE strengthening/ROM, Elevations, Therapeutic exercise, Endurance training, Cognitive reorientation, Patient/family training, Equipment eval/education, Bed mobility, Gait training, Compensatory technique education, Spoke to nursing, Spoke to case management     See flowsheet documentation for full assessment, interventions and recommendations.  Note: Prognosis: Good  Problem List: Decreased strength, Decreased range of motion, Decreased endurance, Impaired balance, Decreased mobility, Decreased cognition, Impaired judgement, Decreased safety awareness, Obesity, Decreased skin integrity, Orthopedic restrictions, Pain  Assessment: Pt seen for PT evaluation POD0 s/p elective L TKA by Dr. Ma on 2/5/2024, WBAT. Comorbidities affecting pt's fnxl performance include: arthritis, L5-S1 spondylolisthesis, HTN, mood disorder, OCD, panic disorder, L basal ganglia infarct, CKD, anxiety, depression, bipolar disorder. Evaluated deficits include: restricted ROM, decreased strength, pain w/  function, activity intolerance, WBing intolerance, gait deviations/abnormal gait, lack of HEP, and limited social supports. Pt was educated on importance of frequent mobility & OPPT f/u. Pt verbalizes fair understanding. The AM-PAC and Barthel Index Outcome Assessments were utilized in best determining safe d/c recommendations. Pt is at risk of falls d/t multiple comorbidities, impulsivity, h/o falls, impaired balance, impaired cognition, impaired insight/safety awareness, use of ambulatory aid, varying levels of pain , acuity of medical illness, ongoing medical treatment of primary dx, polypharmacy, and WB restriction. Pt will benefit from continued PT treatment in order to address impairments, decrease risk of falls,  maximize independence w/ fnxl mobility, & ensure safety w/ mobility for ultimate goal of return to home. At this time, therapist recommending Level II Rehab Resources upon d/c from the acute care setting.    Barriers to Discharge: Decreased caregiver support, Inaccessible home environment     Rehab Resource Intensity Level, PT: II (Moderate Resource Intensity)    See flowsheet documentation for full assessment.

## 2024-02-05 NOTE — TELEPHONE ENCOUNTER
"Reason for Disposition  • Question about upcoming scheduled test, no triage required and triager able to answer question    Answer Assessment - Initial Assessment Questions  1. REASON FOR CALL or QUESTION: \"What is your reason for calling today?\" or \"How can I best help you?\" or \"What question do you have that I can help answer?\"     \"I never received a call for my surgery time for tomorrow\"    Advised patient to arrive at [facility] at 0600 for a surgery time of 0730. Patient verbalized understanding.    Protocols used: Information Only Call - No Triage-ADULT-    "

## 2024-02-05 NOTE — OP NOTE
OPERATIVE REPORT  PATIENT NAME: Ayleen Moralez  : 1960  MRN: [id number]  Pt Location:   OR ROOM 01    Surgery Date: 2024    Surgeons and Role:     * Riki Ma MD - Primary     * Yari Wallace PA-C - Assisting     Preop Diagnosis:  Primary osteoarthritis of left knee [M17.12]    Post-Op Diagnosis Codes:     * Primary osteoarthritis of left knee [M17.12]    Procedure(s):  Left - ARTHROPLASTY KNEE TOTAL SAME DAY    Specimens:  * No specimens in log *    Estimated Blood Loss:   Minimal    Drains:  * No LDAs found *    Anesthesia Type:   Spinal, Adductor canal block, sedation     Operative Indications:  Primary osteoarthritis of left knee [M17.12]    Tourniquet time:  62 mins    Prosthesis:  Implant Name Type Inv. Item Serial No.  Lot No. LRB No. Used Action   CEMENT BONE SMART SET GRAY MED VISC - [id number]  CEMENT BONE SMART SET GRAY MED VISC  DEPUY [id number] Left 1 Implanted   BASEPLATE TIBIAL SZ 5 CMNT ROTPLT ATTUNE KNEE SYSTM - [id number]  BASEPLATE TIBIAL SZ 5 CMNT ROTPLT ATTUNE KNEE SYSTM  DEPUY [id number] Left 1 Implanted   COMPONENT PATELLA 38MM MEDIAL DOME ATTUNE - [id number]  COMPONENT PATELLA 38MM MEDIAL DOME ATTUNE  DEPUY [id number] Left 1 Implanted   COMPONENT FEM SZ 6 LT  CMNT PS ATTUNE - [id number]  COMPONENT FEM SZ 6 LT  CMNT PS ATTUNE  DEPUY [id number] Left 1 Implanted   INSERT TIBIAL 6MM SZ 6 ROTPLT PS ATTUNE - [id number]  INSERT TIBIAL 6MM SZ 6 ROTPLT PS ATTUNE  DEPUY [id number] Left 1 Implanted       Indications:  Ayleen Moralez is a 63 y.o. years old female diagnosed with left knee osteoarthritis. Patient failed conservative treatments and elected to proceed with surgical intervention. The risks and complications are discussed with the patient.The patient consented to the procedure.    Knee Technique: Suture (direct) Repair  Knee Approach: Medial Parapatellar    Procedure:  Patient was brought into the OR and placed in supine position. Patient was anethetized with spinal.  Patient also had Adductor canal block done in the holding area. Patient's left knee was prepped and draped in sterile fashion with tourniquet in the upper thigh. A time out was call and identified the left knee was the operating site.  Patient's preop left knee range of motion 0 - 130. The leg was then exsanguinated with Esmarch. The tourniquet is inflated to 250 mmHg.    A longitudinal incision was made center over the left knee. Dissection was then carried down to the extensor mechanism. A medial parapatellar arthrotomy was done to gain access into the knee joint. The patient appeared to have grade 4 changes in medial compartment and grade 3-4 changes in the PF compartment. The meniscus were removed. The anterior fat was also excised. The ACL and PCL were detached and excised. The tibia was then subluxed anteriorly with a retractor. An external tibia cutting guide is used to carried out the proximal tibia osteotomy. Care was make sure the external cutting guide was lined up with tibia anteriorly and also to the 2nd metatarsal with 3 degree of posterior sloping. Once the tibia cut was complete, the attention was then turn to the distal femur. An intramedullary cutting guide was used to perform the distal femoral cut, 5* and 9 mm. An extension block was use to assess the soft tissue balance with knee in full extension, which appears to be satisfactory with a 6 mm spacer. The distal femur was then sized and 2 pin were inserted to set the femoral component rotation. The flexion gap was assessed which appeared to have good balance. The #6 4-in-1 and notch cutting guide were used to complete the distal cut. The size 6 femoral trial was then placed onto the distal femur, which appears to have good fit with slight lateral but not medial overriding.    Attention was then turned to the tibia, which was prepared with the #5 tibial tray in slight external rotation. With a 6 mm trial spacer placed, the knee had good stability  in mid-flexion and full extension. The patella was prepared to accommodate a size 38 button. A trial button was then used. Range motion of the knee was then done. The patella appeared to be gliding nicely without tilting or subluxing. The trials were removed and the bony surface was irrigated with saline to remove bone debris.  30 cc of Duramorph solution was injected into the posterior, medial and lateral soft tissue. Once the bone surface is dried up and all the components were open. The cement was mixed and final prosthesis was then placed onto the distal femur, proximal tibia, and patella with cement. Excess cement was removed.  1 L of Irrisept was used to irrigate the joint for 1 minute.  Saline solution was further used to irrigate the joint once the Irrisept was removed.  Once the cement was harden, the tourniquet was let down. There were no active arterial bleeding. The venous bleeding was control with electro-Bovie and Aquamantys. The extensor mechanism was then repaired with #1 Vicryl in an interrupted fashion. Layered closure was carried out with 0 Vicryl and 2-0 Vicryl, and Stratafix was used to close the skin. Patient's post-op left knee range of motion 0 - 130 with good varus and valgus stability at full extension and mid-flex.  Steri-strips were then applied with a sterile bulky dressing.    The patient tolerated the procedure well without any complications. Patient was transferred to recovery room for post-op care. The family was contacted.    There was no qualified resident available to assist.    Mrs. Wallace was required in the OR. An assistant was necessary for the surgery given the complexity of the operation, and the need for a skilled surgical assistant for proper retraction of critical soft tissues including ligaments, tendons, and neurovascular structures, as well as adjacent bony structures.  Precise retractor placement and maintenance of precise retraction is critical, as is skilled  positioning of the limb during the different parts of the procedure.    Complications:   None    Patient Disposition:  PACU     SIGNATURE: Riki Ma MD  DATE: February 5, 2024  TIME: 9:23 AM

## 2024-02-05 NOTE — TELEPHONE ENCOUNTER
Caller: Ayleen    Doctor: Francesca Ramirez     Patient had TKA L  today, 2/4     Reason for call: April / [employer] will need information on [employer] order to approve.       Please call with information needed.   Please call [phone]    Call back#: [phone]

## 2024-02-05 NOTE — QUICK NOTE
Patient will need more than 2 midnight stay due to pain control, gait dysfunction, need for PT/OT and probable rehab placement.

## 2024-02-05 NOTE — PHYSICAL THERAPY NOTE
PHYSICAL THERAPY EVALUATION  DATE: 24  TIME: [id number]    NAME:  Ayleen Moralez  AGE:   63 y.o.  Mrn:   [id number]  Length Of Stay: 0    ADMIT DX:  Primary osteoarthritis of left knee [M17.12]    Past Medical History:   Diagnosis Date    Anxiety     Anxiety disorder     Arthritis     At risk for falls     Bipolar 2 disorder (HCC)     Chronic back pain     Chronic kidney disease     Closed fracture of distal end of right fibula with routine healing 2020    COVID-19     in 2021    CVA (cerebral vascular accident) (HCC)     noted on MRI in the past    Depression     GERD (gastroesophageal reflux disease)     Hypercholesteremia     Hypernatremia     Hypertension     Hypokalemia     Idiopathic chronic pancreatitis (HCC) 2018    Intervertebral disc disorder with radiculopathy of lumbosacral region     resolved: 2015    Kidney disease     Kidney disease     Limb alert care status     LUE-fistula    Panic attacks     Pericardial effusion     PONV (postoperative nausea and vomiting)     Psychiatric problem     Radiculitis     resolved: 2015    Secondary renal hyperparathyroidism (HCC)     Stroke (HCC)     Vitamin D deficiency      Past Surgical History:   Procedure Laterality Date    BUNIONECTOMY      Left foot     CAST APPLICATION Right 2022    Procedure: Application short-arm thumb spica splint;  Surgeon: Lyndon Victoria MD;  Location: [facility];  Service: Orthopedics    COLON SURGERY      COLONOSCOPY  2021    DILATION AND CURETTAGE OF UTERUS      INDUCED       surgically induced    DC ARTERIOVENOUS ANASTOMOSIS OPEN DIRECT Left 2019    Procedure: CREATION FISTULA ARTERIOVENOUS (AV) left wrists possible left upper;  Surgeon: Andrey Quintero MD;  Location: [facility];  Service: Vascular    DC CORRJ HLX VLGS BNCTY SESMDC DSTL METAR OSTEOT Left 2019    Procedure: Serge bunionectomy;  Surgeon: Munir Larkin DPM;  Location: [facility];  Service: Podiatry     AK CORRJ HLX VLGS BNCTY [facility] DSTL METAR OSTEOT Right 8/3/2020    Procedure: BUNIONECTOMY RAFAEL;  Surgeon: Munir Larkin DPM;  Location: [facility];  Service: Podiatry    AK CORRJ HLX VLGS BNCTY SESMDC PROX PHLX OSTEOT Right 9/27/2021    Procedure: BUNIONECTOMY TAMEKA, right tameka osteotomy and 2nd claw toe correction;  Surgeon: James R Lachman, MD;  Location: [facility];  Service: Orthopedics    AK ERCP DX COLLECTION SPECIMEN BRUSHING/WASHING N/A 4/11/2018    Procedure: ENDOSCOPIC RETROGRADE CHOLANGIOPANCREATOGRAPHY (ERCP);  Surgeon: Alfredo Messina MD;  Location: [facility];  Service: Gastroenterology    AK LAPAROSCOPY PROCTOPEXY PROLAPSE N/A 7/13/2018    Procedure: ROBOTIC SIGMOID RESECTION / RECTOPEXY;  Surgeon: ELPIDIO Mcnulty MD;  Location: [facility];  Service: Colorectal    AK OPEN TREATMENT RADIAL SHAFT FRACTURE Right 10/11/2021    Procedure: OPEN REDUCTION W/ INTERNAL FIXATION (ORIF) RADIUS (WRIST), RIGHT DISTAL;  Surgeon: Riki Ma MD;  Location: [facility];  Service: Orthopedics    AK REMOVAL IMPLANT DEEP Right 6/23/2022    Procedure: Removal of hardware volar aspect right distal radius (distal radial plate and screws);  Surgeon: Lyndon Victoria MD;  Location: [facility];  Service: Orthopedics    AK SIGMOIDOSCOPY FLX DX W/COLLJ SPEC BR/WA IF PFRMD N/A 7/13/2018    Procedure: SIGMOIDOSCOPY FLEXIBLE;  Surgeon: ELPIDIO Mcnulty MD;  [facility];  Service: Colorectal    AK TR TDN RESTORE INTRNSC FUNCJ ALL 4 FNGRS Right 6/23/2022    Procedure: Right ring finger flexor digitorum superficialis to flexor pollicis longus tendon transfer;  Surgeon: Lyndon Victoria MD;  Location: [facility];  Service: Orthopedics    TUBAL LIGATION Bilateral 1997    US GUIDED THYROID BIOPSY  7/30/2019       Performed at least 2 patient identifiers during session: Name, Birthday, ID bracelet, and Epic photo     02/05/24 1410   PT Last Visit   PT Visit Date 02/05/24   Note Type   Note type Evaluation   Pain  "Assessment   Pain Assessment Tool 0-10   Pain Score 6   Pain Location/Orientation Orientation: Left;Location: Knee   Pain Onset/Description Onset: Ongoing;Descriptor: Aching;Descriptor: Sore   Effect of Pain on Daily Activities limits tolerance of mobility, WBing to L LE, gait mechanics   Patient's Stated Pain Goal No pain   Hospital Pain Intervention(s) Repositioned;Ambulation/increased activity;Elevated;Emotional support   Multiple Pain Sites No   Restrictions/Precautions   Weight Bearing Precautions Per Order Yes   LLE Weight Bearing Per Order WBAT  (s/p L TKA 2/5/24 by Dr. Ma)   Braces or Orthoses Other (Comment)  (L knee ACE wrap)   Other Precautions Impulsive;Cognitive;Chair Alarm;Bed Alarm;WBS;Limb alert;Fall Risk;Pain   Home Living   Type of Home House   Home Layout Multi-level;Stairs to enter without rails;Bed/bath upstairs;1/2 bath on main level  (1+1 MILO with no HR, 1/2 bathroom first floor, full flight steps to access 2nd floor full bathroom, then another flight of steps to access 3rd floor bedroom)   Bathroom Shower/Tub Tub/shower unit   Bathroom Toilet Standard   Bathroom Equipment Shower chair   Bathroom Accessibility Accessible   Home Equipment Walker   Additional Comments Per pt report, she plans to remain on first floor in \"rec room\" upon return to home, with 1/2 bathroom on that level and plans to sleep in recliner chair.   Prior Function   Level of [place] Independent with ADLs;Independent with functional mobility;Needs assistance with IADLS   Lives With Family  (SISTER)   Receives Help From Family;Outpatient therapy  (OPPT)   IADLs Independent with medication management;Family/Friend/Other provides transportation;Family/Friend/Other provides meals   Falls in the last 6 months (S)  5 to 10  (pt reports 6 falls)   Comments Pt reports that at baseline she is independent with all aspects of self care and functional mobility with RW. Pt reports having family assistance for IADLs, however they " "are not able to physically assist her and they are not home t/o the daytime.   General   Additional Pertinent History Pt is a 63 yr old female presenting POD0 s/p L TKA by CJ Rudolph.   Family/Caregiver Present No   Cognition   Overall Cognitive Status Impaired   Arousal/Participation Cooperative   Orientation Level Oriented X4   Memory Decreased short term memory;Decreased recall of precautions   Following Commands Follows one step commands without difficulty   Subjective   Subjective \"How am I going to walk with the walker? I can't just use one leg...\"   RUE Assessment   RUE Assessment WFL   LUE Assessment   LUE Assessment WFL   RLE Assessment   RLE Assessment WFL   LLE Assessment   LLE Assessment X  (grossly 3-/5 MMT throughout, POD0 s/p L TKA)   Vision-Basic Assessment   Current Vision Wears glasses all the time   Coordination   Movements are Fluid and Coordinated 1   Sensation WFL   Light Touch   RLE Light Touch Grossly intact   LLE Light Touch Grossly intact   Proprioception   RLE Proprioception Grossly intact   LLE Proprioception Grossly Intact   Bed Mobility   Supine to Sit 5  Supervision   Additional items Assist x 1;HOB elevated;Bedrails;Increased time required;Verbal cues   Sit to Supine   (NT as pt was left seated OOB in recliner chair at end of session.)   Additional Comments Pt with overall good balance seated at EOB prior to mobility. Pt denies lightheadedness or dizziness with change in positioning.   Transfers   Sit to Stand 4  Minimal assistance   Additional items Assist x 1;Bedrails;Impulsive;Verbal cues   Stand to Sit 4  Minimal assistance   Additional items Assist x 1;Armrests;Impulsive;Verbal cues   Stand pivot 4  Minimal assistance   Additional items Assist x 1;Verbal cues;Impulsive  (RW)   Additional Comments ODALIS + extensive VCs for hand/LLE placement for safe transfers and optimal mechanics. Pt impulsive to complete all transfers however increased time needed to complete. "   Ambulation/Elevation   Gait pattern Antalgic;Decreased L stance  (uneven cadance, at times shuffling B foot, poor RW management/proximity)   Gait Assistance 4  Minimal assist  (primarily ODALIS, x1 episode of MODA needed during LOB)   Additional items Assist x 1;Verbal cues;Tactile cues   Assistive Device Rolling walker   Distance 3ft + 18ft  (toileting tasks between trials)   Stair Management Assistance Not tested  (pt has 1+1 MILO her home, then full flight of steps x2 to bedroom)   Balance   Static Sitting Good   Dynamic Sitting Fair   Static Standing Fair  (w/ RW)   Dynamic Standing Poor +  (w/ RW)   Ambulatory Poor +  (w/ RW)   Endurance Deficit   Endurance Deficit Yes   Activity Tolerance   Activity Tolerance Patient limited by pain;Other (Comment)  (impaired insight to functional deficits, impaired safety)   Medical Staff Made Aware Spoke with RAMSES Alex, coordinated care with OT Kelley   Nurse Made Aware Spoke with FRANCI Schneider   Assessment   Prognosis Good   Problem List Decreased strength;Decreased range of motion;Decreased endurance;Impaired balance;Decreased mobility;Decreased cognition;Impaired judgement;Decreased safety awareness;Obesity;Decreased skin integrity;Orthopedic restrictions;Pain   Assessment Pt seen for PT evaluation POD0 s/p elective L TKA by Dr. Ma on 2/5/2024, WBAT. Comorbidities affecting pt's fnxl performance include: arthritis, L5-S1 spondylolisthesis, HTN, mood disorder, OCD, panic disorder, L basal ganglia infarct, CKD, anxiety, depression, bipolar disorder. Evaluated deficits include: restricted ROM, decreased strength, pain w/  function, activity intolerance, WBing intolerance, gait deviations/abnormal gait, lack of HEP, and limited social supports. Pt was educated on importance of frequent mobility & OPPT f/u. Pt verbalizes fair understanding. The AM-PAC and Barthel Index Outcome Assessments were utilized in best determining safe d/c recommendations. Pt is at risk of falls d/t multiple  "comorbidities, impulsivity, h/o falls, impaired balance, impaired cognition, impaired insight/safety awareness, use of ambulatory aid, varying levels of pain , acuity of medical illness, ongoing medical treatment of primary dx, polypharmacy, and WB restriction. Pt will benefit from continued PT treatment in order to address impairments, decrease risk of falls, maximize independence w/ fnxl mobility, & ensure safety w/ mobility for ultimate goal of return to home. At this time, therapist recommending Level II Rehab Resources upon d/c from the acute care setting.   Barriers to Discharge Decreased caregiver support;Inaccessible home environment   Goals   Patient Goals \"to be able to walk normally\"   [facility] Expiration Date 02/19/24   Short Term Goal #1 Patient PT goals established in order to maximize mobility & safety, and progress to next level of PT care. Pt will: complete all bed mobility independently in order to promote independence & simulate a home environment; complete all transfers w/ RW at NICHO level in order to increase safety &  independence; ambulate >150ft with RW at NICHO level in order to increase safety with household and short community functional mobility; negotiate 8-10 stairs with HR assist and S in order to facilitate safe access to all areas of her home; improve L LE strength to >/= 4/5 MMT t/o in order to increase safety & decrease risk of falls; demonstrate independence w/ 3-5 LE strengthening exercises to facilitate independence w/ HEP; improve AM-PAC score to >/= 20/24 in order to increase independence with mobility and decrease burden of care; improve Barthel Index score to >/= 65/100 in order to increase independence and decrease risk of falls.   PT Treatment Day 0   Plan   Treatment/Interventions Functional transfer training;LE strengthening/ROM;Elevations;Therapeutic exercise;Endurance training;Cognitive reorientation;Patient/family training;Equipment eval/education;Bed mobility;Gait " training;Compensatory technique education;Spoke to nursing;Spoke to case management   PT Frequency Twice a day   Discharge Recommendation   Rehab Resource Intensity Level, PT II (Moderate Resource Intensity)   AM-PAC Basic Mobility Inpatient   Turning in Flat Bed Without Bedrails 3   Lying on Back to Sitting on Edge of Flat Bed Without Bedrails 3   Moving Bed to Chair 3   Standing Up From Chair Using Arms 3   Walk in Room 2   Climb 3-5 Stairs With Railing 1   Basic Mobility Inpatient Raw Score 15   Basic Mobility Standardized Score 36.97   Highest Level Of Mobility   -HLM Goal 4: Move to chair/commode   JH-HLM Achieved 6: Walk 10 steps or more   Modified Pacific Scale   Modified Pacific Scale 4   Barthel Index   Feeding 10   Bathing 0   Grooming Score 0   Dressing Score 5   Bladder Score 10   Bowels Score 10   Toilet Use Score 5   Transfers (Bed/Chair) Score 10   Mobility (Level Surface) Score 0   Stairs Score 5   Barthel Index Score 55   End of Consult   Patient Position at End of Consult Bedside chair;Bed/Chair alarm activated;All needs within reach     Based on patient's [facility] Highest Level of Mobility scores today, patient currently has a goal of -[facility] Levels: 6: WALK 10 STEPS OR MORE, to be completed with RN staffing each shift, in order to improve overall activity tolerance and mobility, combat hospital related deconditioning, and maximize outcomes for d/c from the acute care setting.     The patient's AM-PAC Basic Mobility Inpatient Short Form Raw Score is 15. A Raw score of less than or equal to 16 suggests the patient may benefit from discharge to post-acute rehabilitation services. Please also refer to the recommendation of the Physical Therapist for safe discharge planning.      Romelia Gonzalez, PT, DPT   Available via [employer]  NPI # [id number]  [place] License - [id number]  2/5/2024

## 2024-02-06 LAB
ANION GAP SERPL CALCULATED.3IONS-SCNC: 10 MMOL/L
BUN SERPL-MCNC: 26 MG/DL (ref 5–25)
CALCIUM SERPL-MCNC: 9.4 MG/DL (ref 8.4–10.2)
CHLORIDE SERPL-SCNC: 107 MMOL/L (ref 96–108)
CO2 SERPL-SCNC: 20 MMOL/L (ref 21–32)
CREAT SERPL-MCNC: 2.33 MG/DL (ref 0.6–1.3)
GFR SERPL CREATININE-BSD FRML MDRD: 21 ML/MIN/1.73SQ M
GLUCOSE SERPL-MCNC: 150 MG/DL (ref 65–140)
HCT VFR BLD AUTO: 29.3 % (ref 34.8–46.1)
HGB BLD-MCNC: 9.1 G/DL (ref 11.5–15.4)
POTASSIUM SERPL-SCNC: 4.4 MMOL/L (ref 3.5–5.3)
SODIUM SERPL-SCNC: 137 MMOL/L (ref 135–147)

## 2024-02-06 PROCEDURE — 97116 GAIT TRAINING THERAPY: CPT

## 2024-02-06 PROCEDURE — 85018 HEMOGLOBIN: CPT | Performed by: ORTHOPAEDIC SURGERY

## 2024-02-06 PROCEDURE — 97530 THERAPEUTIC ACTIVITIES: CPT

## 2024-02-06 PROCEDURE — 80048 BASIC METABOLIC PNL TOTAL CA: CPT | Performed by: ORTHOPAEDIC SURGERY

## 2024-02-06 PROCEDURE — 99024 POSTOP FOLLOW-UP VISIT: CPT | Performed by: ORTHOPAEDIC SURGERY

## 2024-02-06 PROCEDURE — 85014 HEMATOCRIT: CPT | Performed by: ORTHOPAEDIC SURGERY

## 2024-02-06 RX ORDER — OXYCODONE HYDROCHLORIDE 5 MG/1
5 TABLET ORAL EVERY 4 HOURS PRN
Status: DISCONTINUED | OUTPATIENT
Start: 2024-02-06 | End: 2024-02-08 | Stop reason: HOSPADM

## 2024-02-06 RX ORDER — OXYCODONE HYDROCHLORIDE 5 MG/1
5 TABLET ORAL EVERY 4 HOURS PRN
Qty: 30 TABLET | Refills: 0 | Status: SHIPPED | OUTPATIENT
Start: 2024-02-06 | End: 2024-02-06

## 2024-02-06 RX ORDER — OXYCODONE HYDROCHLORIDE 5 MG/1
5 TABLET ORAL EVERY 4 HOURS PRN
Qty: 30 TABLET | Refills: 0 | Status: SHIPPED | OUTPATIENT
Start: 2024-02-06 | End: 2024-02-16

## 2024-02-06 RX ORDER — TRAMADOL HYDROCHLORIDE 50 MG/1
50 TABLET ORAL EVERY 12 HOURS SCHEDULED
Status: DISCONTINUED | OUTPATIENT
Start: 2024-02-06 | End: 2024-02-08 | Stop reason: HOSPADM

## 2024-02-06 RX ORDER — ACETAMINOPHEN 325 MG/1
975 TABLET ORAL EVERY 8 HOURS SCHEDULED
Status: DISCONTINUED | OUTPATIENT
Start: 2024-02-06 | End: 2024-02-08 | Stop reason: HOSPADM

## 2024-02-06 RX ADMIN — BUDESONIDE AND FORMOTEROL FUMARATE DIHYDRATE 2 PUFF: 160; 4.5 AEROSOL RESPIRATORY (INHALATION) at 17:03

## 2024-02-06 RX ADMIN — QUETIAPINE FUMARATE 300 MG: 300 TABLET ORAL at 07:59

## 2024-02-06 RX ADMIN — PANTOPRAZOLE SODIUM 40 MG: 40 TABLET, DELAYED RELEASE ORAL at 05:35

## 2024-02-06 RX ADMIN — CLONAZEPAM 0.5 MG: 0.5 TABLET ORAL at 07:59

## 2024-02-06 RX ADMIN — CLONAZEPAM 0.5 MG: 0.5 TABLET ORAL at 17:00

## 2024-02-06 RX ADMIN — FERROUS SULFATE TAB 325 MG (65 MG ELEMENTAL FE) 325 MG: 325 (65 FE) TAB at 08:00

## 2024-02-06 RX ADMIN — LAMOTRIGINE 200 MG: 100 TABLET ORAL at 08:00

## 2024-02-06 RX ADMIN — LAMOTRIGINE 200 MG: 100 TABLET ORAL at 17:00

## 2024-02-06 RX ADMIN — HALOPERIDOL 10 MG: 5 TABLET ORAL at 17:00

## 2024-02-06 RX ADMIN — ATORVASTATIN CALCIUM 40 MG: 40 TABLET, FILM COATED ORAL at 17:00

## 2024-02-06 RX ADMIN — HALOPERIDOL 10 MG: 5 TABLET ORAL at 07:59

## 2024-02-06 RX ADMIN — TRAMADOL HYDROCHLORIDE 50 MG: 50 TABLET ORAL at 22:27

## 2024-02-06 RX ADMIN — FLUVOXAMINE MALEATE 100 MG: 50 TABLET, FILM COATED ORAL at 07:58

## 2024-02-06 RX ADMIN — FLUVOXAMINE MALEATE 200 MG: 50 TABLET, FILM COATED ORAL at 22:27

## 2024-02-06 RX ADMIN — FOLIC ACID 1 MG: 1 TABLET ORAL at 08:00

## 2024-02-06 RX ADMIN — OXYCODONE HYDROCHLORIDE 10 MG: 10 TABLET ORAL at 17:00

## 2024-02-06 RX ADMIN — ACETAMINOPHEN 975 MG: 325 TABLET, FILM COATED ORAL at 22:27

## 2024-02-06 RX ADMIN — OXYCODONE HYDROCHLORIDE 10 MG: 10 TABLET ORAL at 23:56

## 2024-02-06 RX ADMIN — CLONAZEPAM 0.5 MG: 0.5 TABLET ORAL at 22:27

## 2024-02-06 RX ADMIN — SODIUM CHLORIDE, SODIUM LACTATE, POTASSIUM CHLORIDE, AND CALCIUM CHLORIDE 75 ML/HR: .6; .31; .03; .02 INJECTION, SOLUTION INTRAVENOUS at 05:38

## 2024-02-06 RX ADMIN — CARVEDILOL 25 MG: 25 TABLET, FILM COATED ORAL at 08:00

## 2024-02-06 RX ADMIN — BUDESONIDE AND FORMOTEROL FUMARATE DIHYDRATE 2 PUFF: 160; 4.5 AEROSOL RESPIRATORY (INHALATION) at 08:06

## 2024-02-06 RX ADMIN — FERROUS SULFATE TAB 325 MG (65 MG ELEMENTAL FE) 325 MG: 325 (65 FE) TAB at 17:00

## 2024-02-06 RX ADMIN — OXYCODONE HYDROCHLORIDE 10 MG: 10 TABLET ORAL at 02:32

## 2024-02-06 RX ADMIN — OXYCODONE HYDROCHLORIDE 10 MG: 10 TABLET ORAL at 08:01

## 2024-02-06 RX ADMIN — QUETIAPINE 400 MG: 200 TABLET ORAL at 22:27

## 2024-02-06 RX ADMIN — Medication 5000 UNITS: at 08:08

## 2024-02-06 RX ADMIN — OXYCODONE HYDROCHLORIDE AND ACETAMINOPHEN 500 MG: 500 TABLET ORAL at 08:00

## 2024-02-06 RX ADMIN — CARVEDILOL 25 MG: 25 TABLET, FILM COATED ORAL at 17:00

## 2024-02-06 RX ADMIN — ACETAMINOPHEN 975 MG: 325 TABLET, FILM COATED ORAL at 11:14

## 2024-02-06 RX ADMIN — OXYCODONE HYDROCHLORIDE AND ACETAMINOPHEN 500 MG: 500 TABLET ORAL at 17:00

## 2024-02-06 RX ADMIN — ENOXAPARIN SODIUM 30 MG: 30 INJECTION SUBCUTANEOUS at 22:28

## 2024-02-06 RX ADMIN — ONDANSETRON 4 MG: 2 INJECTION INTRAMUSCULAR; INTRAVENOUS at 12:51

## 2024-02-06 NOTE — PROGRESS NOTES
"Ayleen Moralez  63 y.o.  female  MR#: [id number]  2/6/2024    Post-op days: 1  Extremity: left knee    Subjective: Patient seen and examined. Lying comfortably in bed. No issues overnight. Pain is controlled. PT recommending rehab.    Vitals:   Vitals:    02/05/24 2117   BP:    Pulse:    Resp:    Temp: 97.5 °F (36.4 °C)   SpO2:        Exam:   A&O x 3 NAD  Left knee:  Mepilex dressing c/d/I  Thigh and calf soft, compressible  Actively moving ankle and toes  DP 2+   Sensation intact to light touch      Labs:   WBC No results for input(s): \"WBC\" in the last 72 hours.  H/H   Recent Labs     02/06/24 0225   HGB 9.1*   /  Recent Labs     02/06/24 0225   HCT 29.3*     Sed Rate No results for input(s): \"SEDRATE\" in the last 72 hours.  CRP No results for input(s): \"CRP\" in the last 72 hours.    Assessment:   S/p left TKA    Plan:   WBAT to LLE with assistance  PT/OT  DVT ppx: lovenox 30 mg BID. Discharge with ASA 81 mg BID x 6 weeks  ABLA: Hbg 9.1 this AM. Continue to monitor vitals and H/H  Pain control  Ice and elevation  DC planning to rehab  Follow up as outpatient with Dr. Ma in 10-14 days   "

## 2024-02-06 NOTE — OCCUPATIONAL THERAPY NOTE
Occupational Therapy Treatment Note    Patient Name: Ayleen Moralez  Today's Date: 2/6/2024 02/06/24 1118   OT Last Visit   OT Visit Date 02/06/24   Note Type   Note Type Treatment   Pain Assessment   Pain Assessment Tool Hurtado-Baker FACES   Hurtado-Baker FACES Pain Rating 6   Pain Location/Orientation Orientation: Left;Location: Knee   Restrictions/Precautions   Weight Bearing Precautions Per Order Yes   LLE Weight Bearing Per Order WBAT   Other Precautions Impulsive;Cognitive;Chair Alarm;Bed Alarm;WBS;Fall Risk;Pain   ADL   Eating Assistance 5  Supervision/Setup   Grooming Assistance 5  Supervision/Setup   UB Bathing Assistance 4  Minimal Assistance   LB Bathing Assistance 2  Maximal Assistance   UB Dressing Assistance 4  Minimal Assistance   LB Dressing Assistance 2  Maximal Assistance   Toileting Assistance  3  Moderate Assistance   Bed Mobility   Supine to Sit 5  Supervision   Additional items HOB elevated;Bedrails;Increased time required;Verbal cues   Transfers   Sit to Stand 4  Minimal assistance   Additional items Assist x 1;Verbal cues;Impulsive   Stand to Sit 4  Minimal assistance   Additional items Assist x 1;Verbal cues;Impulsive   Stand pivot 4  Minimal assistance   Additional items Assist x 1;Verbal cues;Impulsive  (RW)   Functional Mobility   Functional Mobility 4  Minimal assistance   Additional Comments (S)  x1; continues to require step-by-step verbal cues for sequencing, with no carryover of RW use/safety   Additional items Rolling walker   Subjective   Subjective Can I just go home?  (educated pt on why she is unsafe to return home at this time; pt in agreement after discussion)   Cognition   Overall Cognitive Status Impaired   Arousal/Participation Alert   Attention Difficulty attending to directions   Orientation Level Oriented to person;Oriented to place   Memory Decreased recall of precautions;Decreased recall of recent events;Decreased short term memory   Following Commands Follows  one step commands with increased time or repetition   Comments (S)  Pt continues to require extensive verbal cues for safety, with significant impulsivity and poor insight. High fall risk.   Assessment   Assessment Patient participated in Skilled OT session this date with interventions consisting of one handed dressing technique and  therapeutic activities to: increase activity tolerance . Patient agreeable to OT treatment session, upon arrival patient was found supine in bed.  Treatment session as follows: Pt required supervision for bed mobility and Min A x1 for transfers/functional mobility. Did not observe ADL routine on this date. With use of clinical reasoning, pt's performance throughout evaluation indicates that pt may be able to perform these tasks at the levels listed above.  Pt continues with very poor insight into deficit and limited ability to follow safety precautions. Patient continues to be functioning below baseline level, occupational performance remains limited secondary to factors listed above and increased risk for falls and injury.  The patient's raw score on the -PAC Daily Activity inpatient short form is 18, standardized score is 38.66, less than 39.4. Patients at this level are likely to benefit from DC to post-acute rehabilitation services. From OT standpoint, recommendation at time of d/c would be level 2.   Patient to benefit from continued Occupational Therapy treatment while in the hospital to address deficits as defined above and maximize level of functional independence with ADLs and functional mobility. Pt seen as a co-tx with PT due to the patient's co-morbidities, clinically unstable presentation, and present impairments which are a regression from the patient's baseline. Pt left with  call bell in reach, tray table in reach, needs met, chair alarm activated, RN in room.   Plan   OT Treatment Day 1   Discharge Recommendation   Rehab Resource Intensity Level, OT II (Moderate  Resource Intensity)   AM-PAC Daily Activity Inpatient   Lower Body Dressing 2   Bathing 2   Toileting 3   Upper Body Dressing 3   Grooming 4   Eating 4   Daily Activity Raw Score 18   Daily Activity Standardized Score (Calc for Raw Score >=11) 38.66     Zaira Palmer MS, OTR/L

## 2024-02-06 NOTE — PLAN OF CARE
Problem: OCCUPATIONAL THERAPY ADULT  Goal: Performs self-care activities at highest level of function for planned discharge setting.  See evaluation for individualized goals.  Description:   Outcome: Progressing  Note: Limitation: Decreased ADL status, Decreased UE ROM, Decreased UE strength, Decreased Safe judgement during ADL, Decreased cognition, Decreased endurance, Decreased self-care trans, Decreased high-level ADLs  Prognosis: Fair  Assessment: Patient participated in Skilled OT session this date with interventions consisting of one handed dressing technique and  therapeutic activities to: increase activity tolerance . Patient agreeable to OT treatment session, upon arrival patient was found supine in bed.  Treatment session as follows: Pt required supervision for bed mobility and Min A x1 for transfers/functional mobility. Did not observe ADL routine on this date. With use of clinical reasoning, pt's performance throughout evaluation indicates that pt may be able to perform these tasks at the levels listed above.  Pt continues with very poor insight into deficit and limited ability to follow safety precautions. Patient continues to be functioning below baseline level, occupational performance remains limited secondary to factors listed above and increased risk for falls and injury.  The patient's raw score on the -PAC Daily Activity inpatient short form is 18, standardized score is 38.66, less than 39.4. Patients at this level are likely to benefit from DC to post-acute rehabilitation services. From OT standpoint, recommendation at time of d/c would be level 2.   Patient to benefit from continued Occupational Therapy treatment while in the hospital to address deficits as defined above and maximize level of functional independence with ADLs and functional mobility. Pt seen as a co-tx with PT due to the patient's co-morbidities, clinically unstable presentation, and present impairments which are a  regression from the patient's baseline. Pt left with  call bell in reach, tray table in reach, needs met, chair alarm activated, RN in room.     Rehab Resource Intensity Level, OT: II (Moderate Resource Intensity)

## 2024-02-06 NOTE — PLAN OF CARE
Problem: Prexisting or High Potential for Compromised Skin Integrity  Goal: Skin integrity is maintained or improved  Description: INTERVENTIONS:  - Identify patients at risk for skin breakdown  - Assess and monitor skin integrity  - Assess and monitor nutrition and hydration status  - Monitor labs   - Assess for incontinence   - Turn and reposition patient  - Assist with mobility/ambulation  - Relieve pressure over bony prominences  - Avoid friction and shearing  - Provide appropriate hygiene as needed including keeping skin clean and dry  - Evaluate need for skin moisturizer/barrier cream  - Collaborate with interdisciplinary team   - Patient/family teaching  - Consider wound care consult   Outcome: Progressing     Problem: PAIN - ADULT  Goal: Verbalizes/displays adequate comfort level or baseline comfort level  Description: Interventions:  - Encourage patient to monitor pain and request assistance  - Assess pain using appropriate pain scale  - Administer analgesics based on type and severity of pain and evaluate response  - Implement non-pharmacological measures as appropriate and evaluate response  - Consider cultural and social influences on pain and pain management  - Notify physician/advanced practitioner if interventions unsuccessful or patient reports new pain  Outcome: Progressing     Problem: INFECTION - ADULT  Goal: Absence or prevention of progression during hospitalization  Description: INTERVENTIONS:  - Assess and monitor for signs and symptoms of infection  - Monitor lab/diagnostic results  - Monitor all insertion sites, i.e. indwelling lines, tubes, and drains  - Monitor endotracheal if appropriate and nasal secretions for changes in amount and color  - [place] appropriate cooling/warming therapies per order  - Administer medications as ordered  - Instruct and encourage patient and family to use good hand hygiene technique  - Identify and instruct in appropriate isolation precautions for  identified infection/condition  Outcome: Progressing  Goal: Absence of fever/infection during neutropenic period  Description: INTERVENTIONS:  - Monitor WBC    Outcome: Progressing     Problem: SAFETY ADULT  Goal: Patient will remain free of falls  Description: INTERVENTIONS:  - Educate patient/family on patient safety including physical limitations  - Instruct patient to call for assistance with activity   - Consult OT/PT to assist with strengthening/mobility   - Keep Call bell within reach  - Keep bed low and locked with side rails adjusted as appropriate  - Keep care items and personal belongings within reach  - Initiate and maintain comfort rounds  - Make Fall Risk Sign visible to staff  - Offer Toileting every 2 Hours, in advance of need  - Initiate/Maintain alarm  - Obtain necessary fall risk management equipment:   - Apply yellow socks and bracelet for high fall risk patients  - Consider moving patient to room near nurses station  Outcome: Progressing  Goal: Maintain or return to baseline ADL function  Description: INTERVENTIONS:  -  Assess patient's ability to carry out ADLs; assess patient's baseline for ADL function and identify physical deficits which impact ability to perform ADLs (bathing, care of mouth/teeth, toileting, grooming, dressing, etc.)  - Assess/evaluate cause of self-care deficits   - Assess range of motion  - Assess patient's mobility; develop plan if impaired  - Assess patient's need for assistive devices and provide as appropriate  - Encourage maximum independence but intervene and supervise when necessary  - Involve family in performance of ADLs  - Assess for home care needs following discharge   - Consider OT consult to assist with ADL evaluation and planning for discharge  - Provide patient education as appropriate  Outcome: Progressing  Goal: Maintains/Returns to pre admission functional level  Description: INTERVENTIONS:  - Perform AM-PAC 6 Click Basic Mobility/ Daily Activity  assessment daily.  - Set and communicate daily mobility goal to care team and patient/family/caregiver.   - Collaborate with rehabilitation services on mobility goals if consulted  - Perform Range of Motion 2 times a day.  - Reposition patient every 2 hours.  - Dangle patient 2 times a day  - Stand patient 2 times a day  - Ambulate patient 2 times a day  - Out of bed to chair 2 times a day   - Out of bed for meals 2 times a day  - Out of bed for toileting  - Record patient progress and toleration of activity level   Outcome: Progressing     Problem: SAFETY ADULT  Goal: Maintain or return to baseline ADL function  Description: INTERVENTIONS:  -  Assess patient's ability to carry out ADLs; assess patient's baseline for ADL function and identify physical deficits which impact ability to perform ADLs (bathing, care of mouth/teeth, toileting, grooming, dressing, etc.)  - Assess/evaluate cause of self-care deficits   - Assess range of motion  - Assess patient's mobility; develop plan if impaired  - Assess patient's need for assistive devices and provide as appropriate  - Encourage maximum independence but intervene and supervise when necessary  - Involve family in performance of ADLs  - Assess for home care needs following discharge   - Consider OT consult to assist with ADL evaluation and planning for discharge  - Provide patient education as appropriate  Outcome: Progressing

## 2024-02-06 NOTE — PLAN OF CARE
Problem: Prexisting or High Potential for Compromised Skin Integrity  Goal: Skin integrity is maintained or improved  Description: INTERVENTIONS:  - Identify patients at risk for skin breakdown  - Assess and monitor skin integrity  - Assess and monitor nutrition and hydration status  - Monitor labs   - Assess for incontinence   - Turn and reposition patient  - Assist with mobility/ambulation  - Relieve pressure over bony prominences  - Avoid friction and shearing  - Provide appropriate hygiene as needed including keeping skin clean and dry  - Evaluate need for skin moisturizer/barrier cream  - Collaborate with interdisciplinary team   - Patient/family teaching  - Consider wound care consult   Outcome: Progressing     Problem: PAIN - ADULT  Goal: Verbalizes/displays adequate comfort level or baseline comfort level  Description: Interventions:  - Encourage patient to monitor pain and request assistance  - Assess pain using appropriate pain scale  - Administer analgesics based on type and severity of pain and evaluate response  - Implement non-pharmacological measures as appropriate and evaluate response  - Consider cultural and social influences on pain and pain management  - Notify physician/advanced practitioner if interventions unsuccessful or patient reports new pain  Outcome: Progressing     Problem: INFECTION - ADULT  Goal: Absence or prevention of progression during hospitalization  Description: INTERVENTIONS:  - Assess and monitor for signs and symptoms of infection  - Monitor lab/diagnostic results  - Monitor all insertion sites, i.e. indwelling lines, tubes, and drains  - Monitor endotracheal if appropriate and nasal secretions for changes in amount and color  - [place] appropriate cooling/warming therapies per order  - Administer medications as ordered  - Instruct and encourage patient and family to use good hand hygiene technique  - Identify and instruct in appropriate isolation precautions for  identified infection/condition  Outcome: Progressing  Goal: Absence of fever/infection during neutropenic period  Description: INTERVENTIONS:  - Monitor WBC    Outcome: Progressing     Problem: SAFETY ADULT  Goal: Patient will remain free of falls  Description: INTERVENTIONS:  - Educate patient/family on patient safety including physical limitations  - Instruct patient to call for assistance with activity   - Consult OT/PT to assist with strengthening/mobility   - Keep Call bell within reach  - Keep bed low and locked with side rails adjusted as appropriate  - Keep care items and personal belongings within reach  - Initiate and maintain comfort rounds  - Make Fall Risk Sign visible to staff  - Offer Toileting every 2 Hours, in advance of need  - Apply yellow socks and bracelet for high fall risk patients  - Consider moving patient to room near nurses station  Outcome: Progressing  Goal: Maintain or return to baseline ADL function  Description: INTERVENTIONS:  -  Assess patient's ability to carry out ADLs; assess patient's baseline for ADL function and identify physical deficits which impact ability to perform ADLs (bathing, care of mouth/teeth, toileting, grooming, dressing, etc.)  - Assess/evaluate cause of self-care deficits   - Assess range of motion  - Assess patient's mobility; develop plan if impaired  - Assess patient's need for assistive devices and provide as appropriate  - Encourage maximum independence but intervene and supervise when necessary  - Involve family in performance of ADLs  - Assess for home care needs following discharge   - Consider OT consult to assist with ADL evaluation and planning for discharge  - Provide patient education as appropriate  Outcome: Progressing  Goal: Maintains/Returns to pre admission functional level  Description: INTERVENTIONS:  - Perform AM-PAC 6 Click Basic Mobility/ Daily Activity assessment daily.  - Set and communicate daily mobility goal to care team and  patient/family/caregiver.   - Collaborate with rehabilitation services on mobility goals if consulted  - Perform Range of Motion 3 times a day.  - Reposition patient every 2 hours.  - Dangle patient 3 times a day  - Stand patient 3 times a day  - Ambulate patient 3 times a day  - Out of bed to chair 3 times a day   - Out of bed for meals 3 times a day  - Out of bed for toileting  - Record patient progress and toleration of activity level   Outcome: Progressing     Problem: DISCHARGE PLANNING  Goal: Discharge to home or other facility with appropriate resources  Description: INTERVENTIONS:  - Identify barriers to discharge w/patient and caregiver  - Arrange for needed discharge resources and transportation as appropriate  - Identify discharge learning needs (meds, wound care, etc.)  - Arrange for interpretive services to assist at discharge as needed  - Refer to Case Management Department for coordinating discharge planning if the patient needs post-hospital services based on physician/advanced practitioner order or complex needs related to functional status, cognitive ability, or social support system  Outcome: Progressing     Problem: Knowledge Deficit  Goal: Patient/family/caregiver demonstrates understanding of disease process, treatment plan, medications, and discharge instructions  Description: Complete learning assessment and assess knowledge base.  Interventions:  - Provide teaching at level of understanding  - Provide teaching via preferred learning methods  Outcome: Progressing

## 2024-02-06 NOTE — PLAN OF CARE
Problem: PHYSICAL THERAPY ADULT  Goal: Performs mobility at highest level of function for planned discharge setting.  See evaluation for individualized goals.  Description: Treatment/Interventions: Functional transfer training, LE strengthening/ROM, Elevations, Therapeutic exercise, Endurance training, Cognitive reorientation, Patient/family training, Equipment eval/education, Bed mobility, Gait training, Compensatory technique education, Spoke to nursing, Spoke to case management     See flowsheet documentation for full assessment, interventions and recommendations.  Outcome: Progressing  Note: Prognosis: Good  Problem List: Decreased strength, Decreased range of motion, Decreased endurance, Impaired balance, Decreased mobility, Decreased cognition, Impaired judgement, Decreased safety awareness, Obesity, Decreased skin integrity, Orthopedic restrictions, Pain  Assessment: Pt agrees to PT treatment and is pleasant and cooperative throughout session.  Pt is confused with decreased memory throughout session. Poor insight into deficits.  Continuing to require assist for transfers and ambulation at this time.  Requires assist for RW management during ambulation.  Will continue to benefit from ongoing skilled PT to maximize her functional mobility and increase her level of independence.  Barriers to Discharge: Decreased caregiver support, Inaccessible home environment     Rehab Resource Intensity Level, PT: II (Moderate Resource Intensity)    See flowsheet documentation for full assessment.

## 2024-02-06 NOTE — CASE MANAGEMENT
Case Management Assessment & Discharge Planning Note    Patient name Ayleen Moralez  Location /-01 MRN [id number]  : 1960 Date 2024       Current Admission Date: 2024  Current Admission Diagnosis:Primary osteoarthritis of left knee   Patient Active Problem List    Diagnosis Date Noted    Primary localized osteoarthritis of knees, bilateral 2023    Acute pain of left knee 2023    Bipolar I disorder, current or most recent episode manic, severe with mood-incongruent psychotic features ([facility]) 2023    Generalized anxiety disorder 2023    Moderate aortic stenosis 2023    Pulmonary embolism with infarction ([facility]) 2023    ILD (interstitial lung disease) ([facility]) 2023    Statin intolerance 2023    Concussion without loss of consciousness 2023    Shortness of breath 2023    Leg swelling 2023    Obesity (BMI 30-39.9) 2023    Umbilical hernia 2023    Pes anserinus bursitis of both knees 2023    Obesity, morbid ([facility]) 10/10/2022    Chronic back pain     Severe depressed bipolar I disorder without psychotic features ([facility]) 2022    Mixed obsessional thoughts and acts 2022    Eating disorder, unspecified 2022    Pain of right upper extremity 2022    Continuous opioid dependence ([facility]) 2022    Injury of right thumb 2022    End stage renal disease ([facility]) 2022    GERD (gastroesophageal reflux disease)     Acute shoulder pain 2021    Essential hypertension 2021    Nausea 2021    Aftercare following surgery of the musculoskeletal system 10/27/2021    Closed Colles' fracture of right radius 10/05/2021    Claw toe, acquired, right 2021    Injury of plantar plate, right, initial encounter 2021    Acquired hallux interphalangeus, right 2021    Anxiety     Family history of cardiac disorder in father 2021    Left knee tendonitis 2021     Effusion of right knee 02/12/2021    Hallux valgus, right 06/11/2020    Acquired hallux interphalangeus of right foot 06/11/2020    Bilateral sacroiliitis (HCC) 02/19/2020    CKD (chronic kidney disease) stage 4, GFR 15-29 ml/min ([facility]) 02/06/2020    Right patellofemoral syndrome 01/30/2020    AVF (arteriovenous fistula) ([facility]) 01/08/2020    Steal syndrome dialysis vascular access ([facility]) 12/02/2019    Primary osteoarthritis of left knee 10/15/2019    Moderate persistent asthma without complication 08/27/2019    Thyroid nodule 08/14/2019    Abnormal thyroid exam 06/17/2019    Hyperphosphatemia 06/13/2019    Abnormal chest CT 06/05/2019    Infarction of left basal ganglia ([facility]) 05/01/2019    Benign neoplasm of colon 04/02/2019    Drug-induced constipation 04/02/2019    Hyperparathyroidism ([facility]) 03/19/2019    Mood disorder ([facility]) 01/23/2019    Obsessive compulsive disorder 01/23/2019    Panic disorder with agoraphobia 01/23/2019    Eating disorder 01/23/2019    Primary hypertension 01/02/2019    Hyperprolactinemia ([facility]) 01/02/2019    Elevated LFTs 11/13/2018    Rectal prolapse 07/13/2018    Idiopathic chronic pancreatitis ([facility]) 03/20/2018    Primary osteoarthritis of right knee 03/20/2018    Age-related osteoporosis without current pathological fracture 03/20/2018    Cardiac murmur 03/20/2018    Renal cyst 02/20/2018    Vitamin D deficiency 12/20/2017    Secondary renal hyperparathyroidism (HCC) 12/20/2017    Spondylolisthesis at L5-S1 level 01/20/2017    Hypercholesteremia 01/17/2017    Herniated nucleus pulposus, L5-S1 11/23/2015      LOS (days): 1  Geometric Mean LOS (GMLOS) (days):   Days to GMLOS:     OBJECTIVE:    Risk of Unplanned Readmission Score: 31.26         Current admission status: Inpatient       Preferred Pharmacy:   [facility] - [place] - [address] [place] [address]  Phone: [phone] Fax: [phone]    [facility],  [address]  [id number], [address]  [facility] [address]  Phone: [phone] Fax: [phone]    [facility] - [place], [place] - [address]  [address]  [facility] [address]  Phone: [phone] Fax: [phone]    [facility] - [place], [place] - [address]  [address] [facility]  [address]  [facility] [address]  Phone: [phone] Fax: [phone]    [facility] - [place], [place] - [address]  [address]  [place] [address]  Phone: [phone] Fax: [phone]    [facility] - [facility] [address]  [address]  [facility] [address]  [facility] [address]  Phone: [phone] Fax: [phone]    Primary Care Provider: Bette Harp, DO    Primary Insurance: MEDICARE  Secondary Insurance:     ASSESSMENT:  Active Health Care Proxies       Radha Driver Health Care Representative - Sister   Primary Phone: [phone] (Mobile)                 Advance Directives  Does patient have a Health Care POA?: No  Was patient offered paperwork?: Yes (declined)  Does patient currently have a Health Care decision maker?: Yes, please see Health Care Proxy section  Does patient have Advance Directives?: No  Was patient offered paperwork?: Yes (declined)  Primary Contact: Radha Brown         Readmission Root Cause  30 Day Readmission: No    Patient Information  Admitted from:: Home  Mental Status: Alert  During Assessment patient was accompanied by: Not accompanied during assessment  Assessment information provided by:: Patient  Primary Caregiver: Self  Support Systems: Self, Family members  County of Residence: [place]  What [facility] do you live in?: [place]  Type of Current Residence: [address] home  Upon entering residence, is there a bedroom on the main floor (no further steps)?: No  A bedroom is located on the following floor levels of residence (select all that  apply):: 2nd Floor, 3rd Floor  Upon entering residence, is there a bathroom on the main floor (no further steps)?: Yes  Number of steps to 2nd floor from main floor: One Flight  Number of steps to 3rd floor from main floor: Two Flights  Living Arrangements: Other (Comment) (Lives w/ sister)    Activities of Daily Living Prior to Admission  Functional Status: Independent  Completes ADLs independently?: Yes  Ambulates independently?: Yes  Does patient use assisted devices?: Yes  Assisted Devices (DME) used: Shower Chair, Walker  Does patient currently own DME?: Yes  What DME does the patient currently own?: Shower Chair, Walker  Does patient have a history of Outpatient Therapy (PT/OT)?: No  Does the patient have a history of Short-Term Rehab?: Yes ([facility])  Does patient have a history of HHC?: No  Does patient currently have HHC?: No         Patient Information Continued  Income Source: SSI/SSD  Does patient have prescription coverage?: Yes  Does patient receive dialysis treatments?: No  Does patient have a history of substance abuse?: No  Does patient have a history of Mental Health Diagnosis?: No         Means of Transportation  Means of Transport to Appts:: Family transport      Housing Stability: Low Risk  (2/6/2024)    Housing Stability Vital Sign     Unable to Pay for Housing in the Last Year: No     Number of Places Lived in the Last Year: 1     Unstable Housing in the Last Year: No   Food Insecurity: No Food Insecurity (2/6/2024)    Hunger Vital Sign     Worried About Running Out of Food in the Last Year: Never true     Ran Out of Food in the Last Year: Never true   Transportation Needs: No Transportation Needs (2/6/2024)    PRAPARE - Transportation     Lack of Transportation (Medical): No     Lack of Transportation (Non-Medical): No   Utilities: Not At Risk (2/6/2024)    [facility] Utilities     Threatened with loss of utilities: No       DISCHARGE DETAILS:    Discharge planning discussed with::  Patient  Freedom of Choice: Yes  Comments - Freedom of Choice: Pt declined str and prefers OP PT which is already set up  CM contacted family/caregiver?: No- see comments (Pt is alert and oriented)  Were Treatment Team discharge recommendations reviewed with patient/caregiver?: Yes  Did patient/caregiver verbalize understanding of patient care needs?: Yes  Were patient/caregiver advised of the risks associated with not following Treatment Team discharge recommendations?: Yes         Requested Home Health Care         Is the patient interested in HHC at discharge?: No    DME Referral Provided  Referral made for DME?: No    Other Referral/Resources/Interventions Provided:  Interventions: Outpatient PT         Treatment Team Recommendation: Home  Discharge Destination Plan:: Home  Transport at Discharge : Family                             IMM Given (Date):: 02/06/24 (905 am)  IMM Given to:: Patient     Additional Comments: CM met with pt to discuss role of CM and any needs prior to discharge. Pt lives w/ sister in [place] w/ 2nd and 3rd flr set up. Indp PTA. Owns/uses RW, shower chair. Hx STR through [facility]. STR recommended by therapy and pt is declining. Pt prefers OP PT which is arranged through [facility]. Hx bipolar, GARRET, depression which is treated with medication. No hx OP PT/HH/DA. Pt prefers to [facility] in [place]. Family will transport at discharge.

## 2024-02-06 NOTE — PHYSICAL THERAPY NOTE
"                 PHYSICAL THERAPY NOTE    Patient Name: Ayleen Moralez  Today's Date: 24 1117   PT Last Visit   PT Visit Date 24   Note Type   Note Type Treatment   Pain Assessment   Pain Assessment Tool Hurtado-Baker FACES   Hurtdao-Baker FACES Pain Rating 6   Pain Location/Orientation Orientation: Left;Location: Knee   Hospital Pain Intervention(s) Repositioned;Ambulation/increased activity   Restrictions/Precautions   Weight Bearing Precautions Per Order Yes   LLE Weight Bearing Per Order WBAT   Other Precautions Impulsive;Cognitive;Chair Alarm;Bed Alarm;Fall Risk;Pain   General   Chart Reviewed Yes   Response to Previous Treatment Patient with no complaints from previous session.   Family/Caregiver Present No   Cognition   Overall Cognitive Status Impaired   Arousal/Participation Alert   Attention Difficulty attending to directions   Comments Pt identified by name and .   Subjective   Subjective Agrees to PT treatment and is cooperative throughout session.  \"Do you think I can go home?\"   Bed Mobility   Supine to Sit 5  Supervision   Additional items HOB elevated;Bedrails;Increased time required;Verbal cues   Sit to Supine Unable to assess  (left OOB in chair post session with alarm intact)   Transfers   Sit to Stand 4  Minimal assistance   Additional items Assist x 1;Increased time required;Verbal cues   Stand to Sit 4  Minimal assistance   Additional items Assist x 1;Increased time required;Verbal cues   Ambulation/Elevation   Gait pattern Improper Weight shift;Forward Flexion;Decreased foot clearance;Antalgic;Short stride;Step to;Excessively slow   Gait Assistance 4  Minimal assist   Additional items Assist x 1;Verbal cues   Assistive Device Rolling walker   Distance ~15` x1   Balance   Static Sitting Good   Dynamic Sitting Fair   Static Standing Fair -   Dynamic Standing Poor +   Ambulatory Poor +   Endurance Deficit   Endurance Deficit Yes   Endurance Deficit Description limited " ambulation distance, fatigue, pain   Activity Tolerance   Activity Tolerance Patient limited by pain;Patient limited by fatigue   Medical Staff Made Aware OT Kelley   Nurse Made Aware Per RN, pt appropriate to treat   Assessment   Problem List Decreased strength;Decreased range of motion;Decreased endurance;Impaired balance;Decreased mobility;Decreased cognition;Impaired judgement;Decreased safety awareness;Obesity;Decreased skin integrity;Orthopedic restrictions;Pain   Assessment Pt agrees to PT treatment and is pleasant and cooperative throughout session.  Pt is confused with decreased memory throughout session. Poor insight into deficits.  Continuing to require assist for transfers and ambulation at this time.  Requires assist for RW management during ambulation.  Will continue to benefit from ongoing skilled PT to maximize her functional mobility and increase her level of independence.   Goals   Patient Goals to go home   STG Expiration Date 02/16/24   PT Treatment Day 1   Plan   Treatment/Interventions Functional transfer training;LE strengthening/ROM;Therapeutic exercise;Endurance training;Elevations;Patient/family training;Equipment eval/education;Bed mobility;Gait training   Progress Slow progress, cognitive deficits   PT Frequency Twice a day   Discharge Recommendation   Rehab Resource Intensity Level, PT II (Moderate Resource Intensity)   Equipment Recommended Walker   Walker Package Recommended Wheeled walker   AM-PAC Basic Mobility Inpatient   Turning in Flat Bed Without Bedrails 3   Lying on Back to Sitting on Edge of Flat Bed Without Bedrails 3   Moving Bed to Chair 3   Standing Up From Chair Using Arms 3   Walk in Room 2   Climb 3-5 Stairs With Railing 1   Basic Mobility Inpatient Raw Score 15   Basic Mobility Standardized Score 36.97   Highest Level Of Mobility   JH-HLM Goal 4: Move to chair/commode   JH-HLM Achieved 6: Walk 10 steps or more   Education   Education Provided Mobility training;Assistive  device   Patient Reinforcement needed   End of Consult   Patient Position at End of Consult Bedside chair;Bed/Chair alarm activated;All needs within reach   Patient requires PT/OT co-treat due to signficant assistance with mobility, cognitive-behavioral impairments, and to challenge activity tolerance. Both PT and OT goals were addressed separately during this session.    The patient's -[facility] Basic Mobility Inpatient Short Form Raw Score is 15. A Raw score of less than or equal to 16 suggests the patient may benefit from discharge to post-acute rehabilitation services.     However please refer to therapist recommendation for discharge planning given other factors that may influence destination.     Adapted from Emil AMBRIZ, Chel J, Isabella J, Ramesh SCHOFIELD. [facility] “6-Clicks” Basic Mobility and Daily Activity Scores With Discharge Destination. Physical Therapy, 2021;101:1-9. DOI: 10.1093/[email or web address]    Angélica Burleson DPT

## 2024-02-07 LAB
ANION GAP SERPL CALCULATED.3IONS-SCNC: 8 MMOL/L
BUN SERPL-MCNC: 30 MG/DL (ref 5–25)
CALCIUM SERPL-MCNC: 9.2 MG/DL (ref 8.4–10.2)
CHLORIDE SERPL-SCNC: 110 MMOL/L (ref 96–108)
CO2 SERPL-SCNC: 22 MMOL/L (ref 21–32)
CREAT SERPL-MCNC: 2.44 MG/DL (ref 0.6–1.3)
DME PARACHUTE DELIVERY DATE ACTUAL: NORMAL
DME PARACHUTE DELIVERY DATE REQUESTED: NORMAL
DME PARACHUTE ITEM DESCRIPTION: NORMAL
DME PARACHUTE ORDER STATUS: NORMAL
DME PARACHUTE SUPPLIER NAME: NORMAL
DME PARACHUTE SUPPLIER PHONE: NORMAL
GFR SERPL CREATININE-BSD FRML MDRD: 20 ML/MIN/1.73SQ M
GLUCOSE SERPL-MCNC: 114 MG/DL (ref 65–140)
HCT VFR BLD AUTO: 29.5 % (ref 34.8–46.1)
HGB BLD-MCNC: 9 G/DL (ref 11.5–15.4)
POTASSIUM SERPL-SCNC: 4.4 MMOL/L (ref 3.5–5.3)
SODIUM SERPL-SCNC: 140 MMOL/L (ref 135–147)

## 2024-02-07 PROCEDURE — 85014 HEMATOCRIT: CPT | Performed by: ORTHOPAEDIC SURGERY

## 2024-02-07 PROCEDURE — 99024 POSTOP FOLLOW-UP VISIT: CPT | Performed by: PHYSICIAN ASSISTANT

## 2024-02-07 PROCEDURE — 80048 BASIC METABOLIC PNL TOTAL CA: CPT | Performed by: ORTHOPAEDIC SURGERY

## 2024-02-07 PROCEDURE — 85018 HEMOGLOBIN: CPT | Performed by: ORTHOPAEDIC SURGERY

## 2024-02-07 RX ADMIN — Medication 5000 UNITS: at 08:30

## 2024-02-07 RX ADMIN — FLUVOXAMINE MALEATE 100 MG: 50 TABLET, FILM COATED ORAL at 08:30

## 2024-02-07 RX ADMIN — FERROUS SULFATE TAB 325 MG (65 MG ELEMENTAL FE) 325 MG: 325 (65 FE) TAB at 17:19

## 2024-02-07 RX ADMIN — OXYCODONE HYDROCHLORIDE AND ACETAMINOPHEN 500 MG: 500 TABLET ORAL at 08:32

## 2024-02-07 RX ADMIN — OXYCODONE HYDROCHLORIDE AND ACETAMINOPHEN 500 MG: 500 TABLET ORAL at 17:19

## 2024-02-07 RX ADMIN — LAMOTRIGINE 200 MG: 100 TABLET ORAL at 08:30

## 2024-02-07 RX ADMIN — BUDESONIDE AND FORMOTEROL FUMARATE DIHYDRATE 2 PUFF: 160; 4.5 AEROSOL RESPIRATORY (INHALATION) at 08:29

## 2024-02-07 RX ADMIN — HALOPERIDOL 10 MG: 5 TABLET ORAL at 17:19

## 2024-02-07 RX ADMIN — ATORVASTATIN CALCIUM 40 MG: 40 TABLET, FILM COATED ORAL at 17:19

## 2024-02-07 RX ADMIN — CLONAZEPAM 0.5 MG: 0.5 TABLET ORAL at 17:22

## 2024-02-07 RX ADMIN — QUETIAPINE FUMARATE 300 MG: 300 TABLET ORAL at 08:30

## 2024-02-07 RX ADMIN — FOLIC ACID 1 MG: 1 TABLET ORAL at 08:30

## 2024-02-07 RX ADMIN — CLONAZEPAM 0.5 MG: 0.5 TABLET ORAL at 21:37

## 2024-02-07 RX ADMIN — OXYCODONE HYDROCHLORIDE 5 MG: 5 TABLET ORAL at 12:19

## 2024-02-07 RX ADMIN — CARVEDILOL 25 MG: 25 TABLET, FILM COATED ORAL at 17:23

## 2024-02-07 RX ADMIN — ACETAMINOPHEN 975 MG: 325 TABLET, FILM COATED ORAL at 21:37

## 2024-02-07 RX ADMIN — FERROUS SULFATE TAB 325 MG (65 MG ELEMENTAL FE) 325 MG: 325 (65 FE) TAB at 08:30

## 2024-02-07 RX ADMIN — SODIUM CHLORIDE, SODIUM LACTATE, POTASSIUM CHLORIDE, AND CALCIUM CHLORIDE 75 ML/HR: .6; .31; .03; .02 INJECTION, SOLUTION INTRAVENOUS at 08:29

## 2024-02-07 RX ADMIN — TRAMADOL HYDROCHLORIDE 50 MG: 50 TABLET ORAL at 21:37

## 2024-02-07 RX ADMIN — HALOPERIDOL 10 MG: 5 TABLET ORAL at 08:30

## 2024-02-07 RX ADMIN — ACETAMINOPHEN 975 MG: 325 TABLET, FILM COATED ORAL at 05:54

## 2024-02-07 RX ADMIN — ENOXAPARIN SODIUM 30 MG: 30 INJECTION SUBCUTANEOUS at 21:37

## 2024-02-07 RX ADMIN — CLONAZEPAM 0.5 MG: 0.5 TABLET ORAL at 08:30

## 2024-02-07 RX ADMIN — LAMOTRIGINE 200 MG: 100 TABLET ORAL at 17:19

## 2024-02-07 RX ADMIN — QUETIAPINE 400 MG: 200 TABLET ORAL at 21:44

## 2024-02-07 RX ADMIN — ACETAMINOPHEN 975 MG: 325 TABLET, FILM COATED ORAL at 13:15

## 2024-02-07 RX ADMIN — BUDESONIDE AND FORMOTEROL FUMARATE DIHYDRATE 2 PUFF: 160; 4.5 AEROSOL RESPIRATORY (INHALATION) at 17:19

## 2024-02-07 RX ADMIN — TRAMADOL HYDROCHLORIDE 50 MG: 50 TABLET ORAL at 08:30

## 2024-02-07 RX ADMIN — CARVEDILOL 25 MG: 25 TABLET, FILM COATED ORAL at 08:30

## 2024-02-07 RX ADMIN — FLUVOXAMINE MALEATE 200 MG: 50 TABLET, FILM COATED ORAL at 21:44

## 2024-02-07 RX ADMIN — OXYCODONE HYDROCHLORIDE 5 MG: 5 TABLET ORAL at 19:34

## 2024-02-07 NOTE — CASE MANAGEMENT
Case Management Discharge Planning Note    Patient name Ayleen Moralez  Location /-01 MRN [id number]  : 1960 Date 2024       Current Admission Date: 2024  Current Admission Diagnosis:Primary osteoarthritis of left knee   Patient Active Problem List    Diagnosis Date Noted    Primary localized osteoarthritis of knees, bilateral 2023    Acute pain of left knee 2023    Bipolar I disorder, current or most recent episode manic, severe with mood-incongruent psychotic features ([facility]) 2023    Generalized anxiety disorder 2023    Moderate aortic stenosis 2023    Pulmonary embolism with infarction ([facility]) 2023    ILD (interstitial lung disease) ([facility]) 2023    Statin intolerance 2023    Concussion without loss of consciousness 2023    Shortness of breath 2023    Leg swelling 2023    Obesity (BMI 30-39.9) 2023    Umbilical hernia 2023    Pes anserinus bursitis of both knees 2023    Obesity, morbid ([facility]) 10/10/2022    Chronic back pain     Severe depressed bipolar I disorder without psychotic features ([facility]) 2022    Mixed obsessional thoughts and acts 2022    Eating disorder, unspecified 2022    Pain of right upper extremity 2022    Continuous opioid dependence ([facility]) 2022    Injury of right thumb 2022    End stage renal disease ([facility]) 2022    GERD (gastroesophageal reflux disease)     Acute shoulder pain 2021    Essential hypertension 2021    Nausea 2021    Aftercare following surgery of the musculoskeletal system 10/27/2021    Closed Colles' fracture of right radius 10/05/2021    Claw toe, acquired, right 2021    Injury of plantar plate, right, initial encounter 2021    Acquired hallux interphalangeus, right 2021    Anxiety     Family history of cardiac disorder in father 2021    Left knee tendonitis 2021    Effusion of right  knee 02/12/2021    Hallux valgus, right 06/11/2020    Acquired hallux interphalangeus of right foot 06/11/2020    Bilateral sacroiliitis (HCC) 02/19/2020    CKD (chronic kidney disease) stage 4, GFR 15-29 ml/min (HCC) 02/06/2020    Right patellofemoral syndrome 01/30/2020    AVF (arteriovenous fistula) ([facility]) 01/08/2020    Steal syndrome dialysis vascular access ([facility]) 12/02/2019    Primary osteoarthritis of left knee 10/15/2019    Moderate persistent asthma without complication 08/27/2019    Thyroid nodule 08/14/2019    Abnormal thyroid exam 06/17/2019    Hyperphosphatemia 06/13/2019    Abnormal chest CT 06/05/2019    Infarction of left basal ganglia ([facility]) 05/01/2019    Benign neoplasm of colon 04/02/2019    Drug-induced constipation 04/02/2019    Hyperparathyroidism ([facility]) 03/19/2019    Mood disorder ([facility]) 01/23/2019    Obsessive compulsive disorder 01/23/2019    Panic disorder with agoraphobia 01/23/2019    Eating disorder 01/23/2019    Primary hypertension 01/02/2019    Hyperprolactinemia (HCC) 01/02/2019    Elevated LFTs 11/13/2018    Rectal prolapse 07/13/2018    Idiopathic chronic pancreatitis ([facility]) 03/20/2018    Primary osteoarthritis of right knee 03/20/2018    Age-related osteoporosis without current pathological fracture 03/20/2018    Cardiac murmur 03/20/2018    Renal cyst 02/20/2018    Vitamin D deficiency 12/20/2017    Secondary renal hyperparathyroidism (HCC) 12/20/2017    Spondylolisthesis at L5-S1 level 01/20/2017    Hypercholesteremia 01/17/2017    Herniated nucleus pulposus, L5-S1 11/23/2015      LOS (days): 2  Geometric Mean LOS (GMLOS) (days):   Days to GMLOS:     OBJECTIVE:  Risk of Unplanned Readmission Score: 30.89         Current admission status: Inpatient   Preferred Pharmacy:   [facility] - [place] - [address] [place] [address]  Phone: [phone] Fax: [phone]    [facility], [address]  [id number], [address]  [facility] [address]  Phone: [phone] Fax: [phone]    [facility] - [place], [place] - [address]  [address] [facility] [address]  [facility] [address]  Phone: [phone] Fax: [phone]    [facility] - [place], [place] - [address] [facility] [address] [facility]  [address]  [address]  [facility] [address]  Phone: [phone] Fax: [phone]    [facility] - [place], [place] - [address]  [address] [facility] [address]  Phone: [phone] Fax: [phone]    [facility] - [facility] [address]  [address]  [facility] [address]  [facility] [address]  Phone: [phone] Fax: [phone]    Primary Care Provider: Bette Harp DO    Primary Insurance: MEDICARE  Secondary Insurance:     DISCHARGE DETAILS:     Pt will be ready for discharge to [facility] tomorrow per provider. CM notified [facility] via [facility]. CM requested BLS transport via [facility]. Awaiting a confirmed  time.                                                                                                  Accepting Facility Name, City & State : [facility]  Receiving Facility/Agency Phone Number: [phone]  Facility/Agency Fax Number: [phone]

## 2024-02-07 NOTE — PROGRESS NOTES
"Ayleen Moralez  63 y.o.  female  MR#: [id number]  2/7/2024    Post-op days: 2  Extremity: left knee    Subjective: Patient seen and examined. Lying comfortably in bed. No issues overnight. Pain is controlled, but she does notice discomfort. PT recommending rehab.    Vitals:   Vitals:    02/06/24 2239   BP: 148/87   Pulse: 100   Resp:    Temp: 97.5 °F (36.4 °C)   SpO2: 90%       Exam:   A&O x 3 NAD  Left knee:  Mepilex dressing c/d/I  Thigh and calf soft, compressible  Actively moving ankle and toes  DP 2+   Sensation intact to light touch      Labs:   WBC No results for input(s): \"WBC\" in the last 72 hours.  H/H   Recent Labs     02/06/24 0225 02/07/24  0419   HGB 9.1* 9.0*   /  Recent Labs     02/06/24 0225 02/07/24 0419   HCT 29.3* 29.5*       Assessment:   S/p left TKA    Plan:   WBAT to LLE with assistance  PT/OT  DVT ppx: lovenox 30 mg BID. Discharge with ASA 81 mg BID x 6 weeks  ABLA: Hbg 9.0 this AM. Continue to monitor vitals and H/H.  No bleeding at operative site noted at this time.  Pain control  Ice and elevation  DC planning to rehab  Follow up as outpatient with Dr. Ma in 10-14 days         Abi Bradley PA-C    "

## 2024-02-07 NOTE — PROGRESS NOTES
Patient:  BLANCHE ELIAS    MRN:  [id number]    Aidin Request ID:  [id number]    Level of care reserved:  Skilled Nursing Facility    Partner Reserved:  [facility], [place] [address] [phone]    Clinical needs requested:    Geography searched:  10 miles around [id number]    Start of Service:    Request sent:  11:57am EST on 2/6/2024 by Abi Melo    Partner reserved:  9:35am EST on 2/7/2024 by Abi Melo    Choice list shared:  9:35am EST on 2/7/2024 by Abi Melo

## 2024-02-07 NOTE — PLAN OF CARE
Problem: Prexisting or High Potential for Compromised Skin Integrity  Goal: Skin integrity is maintained or improved  Description: INTERVENTIONS:  - Identify patients at risk for skin breakdown  - Assess and monitor skin integrity  - Assess and monitor nutrition and hydration status  - Monitor labs   - Assess for incontinence   - Turn and reposition patient  - Assist with mobility/ambulation  - Relieve pressure over bony prominences  - Avoid friction and shearing  - Provide appropriate hygiene as needed including keeping skin clean and dry  - Evaluate need for skin moisturizer/barrier cream  - Collaborate with interdisciplinary team   - Patient/family teaching  - Consider wound care consult   Outcome: Progressing     Problem: PAIN - ADULT  Goal: Verbalizes/displays adequate comfort level or baseline comfort level  Description: Interventions:  - Encourage patient to monitor pain and request assistance  - Assess pain using appropriate pain scale  - Administer analgesics based on type and severity of pain and evaluate response  - Implement non-pharmacological measures as appropriate and evaluate response  - Consider cultural and social influences on pain and pain management  - Notify physician/advanced practitioner if interventions unsuccessful or patient reports new pain  Outcome: Progressing     Problem: INFECTION - ADULT  Goal: Absence or prevention of progression during hospitalization  Description: INTERVENTIONS:  - Assess and monitor for signs and symptoms of infection  - Monitor lab/diagnostic results  - Monitor all insertion sites, i.e. indwelling lines, tubes, and drains  - Monitor endotracheal if appropriate and nasal secretions for changes in amount and color  - [place] appropriate cooling/warming therapies per order  - Administer medications as ordered  - Instruct and encourage patient and family to use good hand hygiene technique  - Identify and instruct in appropriate isolation precautions for  identified infection/condition  Outcome: Progressing  Goal: Absence of fever/infection during neutropenic period  Description: INTERVENTIONS:  - Monitor WBC    Outcome: Progressing     Problem: SAFETY ADULT  Goal: Patient will remain free of falls  Description: INTERVENTIONS:  - Educate patient/family on patient safety including physical limitations  - Instruct patient to call for assistance with activity   - Consult OT/PT to assist with strengthening/mobility   - Keep Call bell within reach  - Keep bed low and locked with side rails adjusted as appropriate  - Keep care items and personal belongings within reach  - Initiate and maintain comfort rounds  - Make Fall Risk Sign visible to staff  - Offer Toileting every x Hours, in advance of need  - Initiate/Maintain xalarm  - Obtain necessary fall risk management equipment: x  - Apply yellow socks and bracelet for high fall risk patients  - Consider moving patient to room near nurses station  Outcome: Progressing  Goal: Maintain or return to baseline ADL function  Description: INTERVENTIONS:  -  Assess patient's ability to carry out ADLs; assess patient's baseline for ADL function and identify physical deficits which impact ability to perform ADLs (bathing, care of mouth/teeth, toileting, grooming, dressing, etc.)  - Assess/evaluate cause of self-care deficits   - Assess range of motion  - Assess patient's mobility; develop plan if impaired  - Assess patient's need for assistive devices and provide as appropriate  - Encourage maximum independence but intervene and supervise when necessary  - Involve family in performance of ADLs  - Assess for home care needs following discharge   - Consider OT consult to assist with ADL evaluation and planning for discharge  - Provide patient education as appropriate  Outcome: Progressing  Goal: Maintains/Returns to pre admission functional level  Description: INTERVENTIONS:  - Perform AM-PAC 6 Click Basic Mobility/ Daily Activity  assessment daily.  - Set and communicate daily mobility goal to care team and patient/family/caregiver.   - Collaborate with rehabilitation services on mobility goals if consulted  - Perform Range of Motion x times a day.  - Reposition patient every x hours.  - Dangle patient x times a day  - Stand patient xx times a day  - Ambulate patient x times a day  - Out of bed to chair x times a day   - Out of bed for meals xxxxx times a day  - Out of bed for toileting  - Record patient progress and toleration of activity level   Outcome: Progressing     Problem: DISCHARGE PLANNING  Goal: Discharge to home or other facility with appropriate resources  Description: INTERVENTIONS:  - Identify barriers to discharge w/patient and caregiver  - Arrange for needed discharge resources and transportation as appropriate  - Identify discharge learning needs (meds, wound care, etc.)  - Arrange for interpretive services to assist at discharge as needed  - Refer to Case Management Department for coordinating discharge planning if the patient needs post-hospital services based on physician/advanced practitioner order or complex needs related to functional status, cognitive ability, or social support system  Outcome: Progressing     Problem: Knowledge Deficit  Goal: Patient/family/caregiver demonstrates understanding of disease process, treatment plan, medications, and discharge instructions  Description: Complete learning assessment and assess knowledge base.  Interventions:  - Provide teaching at level of understanding  - Provide teaching via preferred learning methods  Outcome: Progressing     Problem: Potential for Falls  Goal: Patient will remain free of falls  Description: INTERVENTIONS:  - Educate patient/family on patient safety including physical limitations  - Instruct patient to call for assistance with activity   - Consult OT/PT to assist with strengthening/mobility   - Keep Call bell within reach  - Keep bed low and locked with  side rails adjusted as appropriate  - Keep care items and personal belongings within reach  - Initiate and maintain comfort rounds  - Make Fall Risk Sign visible to staff  - Offer Toileting every x Hours, in advance of need  - Initiate/Maintain xalarm  - Obtain necessary fall risk management equipment: xx  - Apply yellow socks and bracelet for high fall risk patients  - Consider moving patient to room near nurses station  Outcome: Progressing

## 2024-02-07 NOTE — PLAN OF CARE
Problem: Prexisting or High Potential for Compromised Skin Integrity  Goal: Skin integrity is maintained or improved  Description: INTERVENTIONS:  - Identify patients at risk for skin breakdown  - Assess and monitor skin integrity  - Assess and monitor nutrition and hydration status  - Monitor labs   - Assess for incontinence   - Turn and reposition patient  - Assist with mobility/ambulation  - Relieve pressure over bony prominences  - Avoid friction and shearing  - Provide appropriate hygiene as needed including keeping skin clean and dry  - Evaluate need for skin moisturizer/barrier cream  - Collaborate with interdisciplinary team   - Patient/family teaching  - Consider wound care consult   Outcome: Progressing     Problem: PAIN - ADULT  Goal: Verbalizes/displays adequate comfort level or baseline comfort level  Description: Interventions:  - Encourage patient to monitor pain and request assistance  - Assess pain using appropriate pain scale  - Administer analgesics based on type and severity of pain and evaluate response  - Implement non-pharmacological measures as appropriate and evaluate response  - Consider cultural and social influences on pain and pain management  - Notify physician/advanced practitioner if interventions unsuccessful or patient reports new pain  Outcome: Progressing     Problem: INFECTION - ADULT  Goal: Absence or prevention of progression during hospitalization  Description: INTERVENTIONS:  - Assess and monitor for signs and symptoms of infection  - Monitor lab/diagnostic results  - Monitor all insertion sites, i.e. indwelling lines, tubes, and drains  - Monitor endotracheal if appropriate and nasal secretions for changes in amount and color  - [place] appropriate cooling/warming therapies per order  - Administer medications as ordered  - Instruct and encourage patient and family to use good hand hygiene technique  - Identify and instruct in appropriate isolation precautions for  identified infection/condition  Outcome: Progressing  Goal: Absence of fever/infection during neutropenic period  Description: INTERVENTIONS:  - Monitor WBC    Outcome: Progressing     Problem: SAFETY ADULT  Goal: Patient will remain free of falls  Description: INTERVENTIONS:  - Educate patient/family on patient safety including physical limitations  - Instruct patient to call for assistance with activity   - Consult OT/PT to assist with strengthening/mobility   - Keep Call bell within reach  - Keep bed low and locked with side rails adjusted as appropriate  - Keep care items and personal belongings within reach  - Initiate and maintain comfort rounds  - Make Fall Risk Sign visible to staff  - Apply yellow socks and bracelet for high fall risk patients  - Consider moving patient to room near nurses station  Outcome: Progressing  Goal: Maintain or return to baseline ADL function  Description: INTERVENTIONS:  -  Assess patient's ability to carry out ADLs; assess patient's baseline for ADL function and identify physical deficits which impact ability to perform ADLs (bathing, care of mouth/teeth, toileting, grooming, dressing, etc.)  - Assess/evaluate cause of self-care deficits   - Assess range of motion  - Assess patient's mobility; develop plan if impaired  - Assess patient's need for assistive devices and provide as appropriate  - Encourage maximum independence but intervene and supervise when necessary  - Involve family in performance of ADLs  - Assess for home care needs following discharge   - Consider OT consult to assist with ADL evaluation and planning for discharge  - Provide patient education as appropriate  Outcome: Progressing  Goal: Maintains/Returns to pre admission functional level  Description: INTERVENTIONS:  - Perform AM-PAC 6 Click Basic Mobility/ Daily Activity assessment daily.  - Set and communicate daily mobility goal to care team and patient/family/caregiver.   - Collaborate with rehabilitation  services on mobility goals if consulted  - Out of bed for toileting  - Record patient progress and toleration of activity level   Outcome: Progressing     Problem: DISCHARGE PLANNING  Goal: Discharge to home or other facility with appropriate resources  Description: INTERVENTIONS:  - Identify barriers to discharge w/patient and caregiver  - Arrange for needed discharge resources and transportation as appropriate  - Identify discharge learning needs (meds, wound care, etc.)  - Arrange for interpretive services to assist at discharge as needed  - Refer to Case Management Department for coordinating discharge planning if the patient needs post-hospital services based on physician/advanced practitioner order or complex needs related to functional status, cognitive ability, or social support system  Outcome: Progressing     Problem: Knowledge Deficit  Goal: Patient/family/caregiver demonstrates understanding of disease process, treatment plan, medications, and discharge instructions  Description: Complete learning assessment and assess knowledge base.  Interventions:  - Provide teaching at level of understanding  - Provide teaching via preferred learning methods  Outcome: Progressing     Problem: Potential for Falls  Goal: Patient will remain free of falls  Description: INTERVENTIONS:  - Educate patient/family on patient safety including physical limitations  - Instruct patient to call for assistance with activity   - Consult OT/PT to assist with strengthening/mobility   - Keep Call bell within reach  - Keep bed low and locked with side rails adjusted as appropriate  - Keep care items and personal belongings within reach  - Initiate and maintain comfort rounds  - Make Fall Risk Sign visible to staff  - Apply yellow socks and bracelet for high fall risk patients  - Consider moving patient to room near nurses station  Outcome: Progressing

## 2024-02-07 NOTE — CASE MANAGEMENT
Case Management Discharge Planning Note    Patient name Ayleen Moralez  Location /-01 MRN [id number]  : 1960 Date 2024       Current Admission Date: 2024  Current Admission Diagnosis:Primary osteoarthritis of left knee   Patient Active Problem List    Diagnosis Date Noted    Primary localized osteoarthritis of knees, bilateral 2023    Acute pain of left knee 2023    Bipolar I disorder, current or most recent episode manic, severe with mood-incongruent psychotic features ([facility]) 2023    Generalized anxiety disorder 2023    Moderate aortic stenosis 2023    Pulmonary embolism with infarction ([facility]) 2023    ILD (interstitial lung disease) ([facility]) 2023    Statin intolerance 2023    Concussion without loss of consciousness 2023    Shortness of breath 2023    Leg swelling 2023    Obesity (BMI 30-39.9) 2023    Umbilical hernia 2023    Pes anserinus bursitis of both knees 2023    Obesity, morbid ([facility]) 10/10/2022    Chronic back pain     Severe depressed bipolar I disorder without psychotic features ([facility]) 2022    Mixed obsessional thoughts and acts 2022    Eating disorder, unspecified 2022    Pain of right upper extremity 2022    Continuous opioid dependence ([facility]) 2022    Injury of right thumb 2022    End stage renal disease ([facility]) 2022    GERD (gastroesophageal reflux disease)     Acute shoulder pain 2021    Essential hypertension 2021    Nausea 2021    Aftercare following surgery of the musculoskeletal system 10/27/2021    Closed Colles' fracture of right radius 10/05/2021    Claw toe, acquired, right 2021    Injury of plantar plate, right, initial encounter 2021    Acquired hallux interphalangeus, right 2021    Anxiety     Family history of cardiac disorder in father 2021    Left knee tendonitis 2021    Effusion of right  knee 02/12/2021    Hallux valgus, right 06/11/2020    Acquired hallux interphalangeus of right foot 06/11/2020    Bilateral sacroiliitis ([facility]CC) 02/19/2020    CKD (chronic kidney disease) stage 4, GFR 15-29 ml/min ([facility]CC) 02/06/2020    Right patellofemoral syndrome 01/30/2020    AVF (arteriovenous fistula) ([facility]) 01/08/2020    Steal syndrome dialysis vascular access ([facility]) 12/02/2019    Primary osteoarthritis of left knee 10/15/2019    Moderate persistent asthma without complication 08/27/2019    Thyroid nodule 08/14/2019    Abnormal thyroid exam 06/17/2019    Hyperphosphatemia 06/13/2019    Abnormal chest CT 06/05/2019    Infarction of left basal ganglia ([facility]) 05/01/2019    Benign neoplasm of colon 04/02/2019    Drug-induced constipation 04/02/2019    Hyperparathyroidism ([facility]) 03/19/2019    Mood disorder ([facility]) 01/23/2019    Obsessive compulsive disorder 01/23/2019    Panic disorder with agoraphobia 01/23/2019    Eating disorder 01/23/2019    Primary hypertension 01/02/2019    Hyperprolactinemia ([facility]CC) 01/02/2019    Elevated LFTs 11/13/2018    Rectal prolapse 07/13/2018    Idiopathic chronic pancreatitis ([facility]) 03/20/2018    Primary osteoarthritis of right knee 03/20/2018    Age-related osteoporosis without current pathological fracture 03/20/2018    Cardiac murmur 03/20/2018    Renal cyst 02/20/2018    Vitamin D deficiency 12/20/2017    Secondary renal hyperparathyroidism (HCC) 12/20/2017    Spondylolisthesis at L5-S1 level 01/20/2017    Hypercholesteremia 01/17/2017    Herniated nucleus pulposus, L5-S1 11/23/2015      LOS (days): 2  Geometric Mean LOS (GMLOS) (days):   Days to GMLOS:     OBJECTIVE:  Risk of Unplanned Readmission Score: 31.91         Current admission status: Inpatient   Preferred Pharmacy:   [id number] - [place] - [address] [address]  Phone: [phone] Fax: [phone]    [id number]  [id number], [address]  [facility] [address]  Phone: [phone] Fax: [phone]    [facility] - [place], [place] - [address]  [address]  [facility] [address]  Phone: [phone] Fax: [phone]    [facility]) - [place] [address] [facility]  [address]  [facility] [address]  Phone: [phone] Fax: [phone]    [facility] - [place], [place] - [address]  [address]  [place] [address]  Phone: [phone] Fax: [phone]    [facility] - [facility] [address]  [address] [place] [address] [address]  [facility] [address]  Phone: [phone] Fax: [phone]    Primary Care Provider: Bette Harp DO    Primary Insurance: MEDICARE  Secondary Insurance:     DISCHARGE DETAILS:              CM spoke with pt and reviewed STR choices and pt selected [facility]. [facility] reserved in [place].

## 2024-02-08 ENCOUNTER — APPOINTMENT (OUTPATIENT)
Dept: PHYSICAL THERAPY | Facility: CLINIC | Age: 64
End: 2024-02-08
Payer: MEDICARE

## 2024-02-08 VITALS
OXYGEN SATURATION: 91 % | RESPIRATION RATE: 16 BRPM | SYSTOLIC BLOOD PRESSURE: 144 MMHG | TEMPERATURE: 97.7 F | DIASTOLIC BLOOD PRESSURE: 71 MMHG | BODY MASS INDEX: 36.7 KG/M2 | HEIGHT: 64 IN | HEART RATE: 96 BPM | WEIGHT: 214.95 LBS

## 2024-02-08 PROCEDURE — 99024 POSTOP FOLLOW-UP VISIT: CPT

## 2024-02-08 RX ADMIN — OXYCODONE HYDROCHLORIDE 5 MG: 5 TABLET ORAL at 06:09

## 2024-02-08 RX ADMIN — OXYCODONE HYDROCHLORIDE AND ACETAMINOPHEN 500 MG: 500 TABLET ORAL at 08:11

## 2024-02-08 RX ADMIN — CLONAZEPAM 0.5 MG: 0.5 TABLET ORAL at 08:11

## 2024-02-08 RX ADMIN — HALOPERIDOL 10 MG: 5 TABLET ORAL at 08:11

## 2024-02-08 RX ADMIN — FLUVOXAMINE MALEATE 100 MG: 50 TABLET, FILM COATED ORAL at 08:15

## 2024-02-08 RX ADMIN — FOLIC ACID 1 MG: 1 TABLET ORAL at 08:11

## 2024-02-08 RX ADMIN — BUDESONIDE AND FORMOTEROL FUMARATE DIHYDRATE 2 PUFF: 160; 4.5 AEROSOL RESPIRATORY (INHALATION) at 08:12

## 2024-02-08 RX ADMIN — PANTOPRAZOLE SODIUM 40 MG: 40 TABLET, DELAYED RELEASE ORAL at 06:05

## 2024-02-08 RX ADMIN — Medication 5000 UNITS: at 08:11

## 2024-02-08 RX ADMIN — LAMOTRIGINE 200 MG: 100 TABLET ORAL at 08:11

## 2024-02-08 RX ADMIN — CARVEDILOL 25 MG: 25 TABLET, FILM COATED ORAL at 08:15

## 2024-02-08 RX ADMIN — ACETAMINOPHEN 975 MG: 325 TABLET, FILM COATED ORAL at 06:05

## 2024-02-08 RX ADMIN — FERROUS SULFATE TAB 325 MG (65 MG ELEMENTAL FE) 325 MG: 325 (65 FE) TAB at 08:12

## 2024-02-08 RX ADMIN — QUETIAPINE FUMARATE 300 MG: 300 TABLET ORAL at 08:15

## 2024-02-08 RX ADMIN — TRAMADOL HYDROCHLORIDE 50 MG: 50 TABLET ORAL at 08:10

## 2024-02-08 NOTE — PROGRESS NOTES
"Ayleen Moralez  63 y.o.  female  MR#: [id number]  2/8/2024    Post-op days: 3  Extremity: left knee    Subjective: Patient seen and examined. Patient asleep this morning, continues to lay comfortably in bed. No issues overnight. Pain is controlled, but she does notice discomfort. PT recommending rehab.    Vitals:   Vitals:    02/08/24 0642   BP: 144/71   Pulse: 96   Resp: 16   Temp: 97.7 °F (36.5 °C)   SpO2: 91%       Exam:   A&O x 3 NAD  Left knee:  Mepilex dressing c/d/I  Thigh and calf soft, compressible  Actively moving ankle and toes  DP 2+   Sensation intact to light touch      Labs:   WBC No results for input(s): \"WBC\" in the last 72 hours.  H/H   Recent Labs     02/06/24 0225 02/07/24  0419   HGB 9.1* 9.0*   /  Recent Labs     02/06/24 0225 02/07/24  0419   HCT 29.3* 29.5*       Assessment:   S/p left TKA    Plan:   WBAT to LLE with assistance  PT/OT  DVT ppx: Discharge with ASA 81 mg BID x 6 weeks  Pain control  Ice and elevation  DC  to rehab at 10am  Follow up as outpatient with Dr. Ma in 10-14 days         Ladi Zaragoza PA-C    "

## 2024-02-08 NOTE — PLAN OF CARE
Problem: Prexisting or High Potential for Compromised Skin Integrity  Goal: Skin integrity is maintained or improved  Description: INTERVENTIONS:  - Identify patients at risk for skin breakdown  - Assess and monitor skin integrity  - Assess and monitor nutrition and hydration status  - Monitor labs   - Assess for incontinence   - Turn and reposition patient  - Assist with mobility/ambulation  - Relieve pressure over bony prominences  - Avoid friction and shearing  - Provide appropriate hygiene as needed including keeping skin clean and dry  - Evaluate need for skin moisturizer/barrier cream  - Collaborate with interdisciplinary team   - Patient/family teaching  - Consider wound care consult   Outcome: Progressing     Problem: PAIN - ADULT  Goal: Verbalizes/displays adequate comfort level or baseline comfort level  Description: Interventions:  - Encourage patient to monitor pain and request assistance  - Assess pain using appropriate pain scale  - Administer analgesics based on type and severity of pain and evaluate response  - Implement non-pharmacological measures as appropriate and evaluate response  - Consider cultural and social influences on pain and pain management  - Notify physician/advanced practitioner if interventions unsuccessful or patient reports new pain  Outcome: Progressing     Problem: INFECTION - ADULT  Goal: Absence or prevention of progression during hospitalization  Description: INTERVENTIONS:  - Assess and monitor for signs and symptoms of infection  - Monitor lab/diagnostic results  - Monitor all insertion sites, i.e. indwelling lines, tubes, and drains  - Monitor endotracheal if appropriate and nasal secretions for changes in amount and color  - [place] appropriate cooling/warming therapies per order  - Administer medications as ordered  - Instruct and encourage patient and family to use good hand hygiene technique  - Identify and instruct in appropriate isolation precautions for  identified infection/condition  Outcome: Progressing  Goal: Absence of fever/infection during neutropenic period  Description: INTERVENTIONS:  - Monitor WBC    Outcome: Progressing     Problem: SAFETY ADULT  Goal: Patient will remain free of falls  Description: INTERVENTIONS:  - Educate patient/family on patient safety including physical limitations  - Instruct patient to call for assistance with activity   - Consult OT/PT to assist with strengthening/mobility   - Keep Call bell within reach  - Keep bed low and locked with side rails adjusted as appropriate  - Keep care items and personal belongings within reach  - Initiate and maintain comfort rounds  - Make Fall Risk Sign visible to staff  - Offer Toileting every xx Hours, in advance of need  - Initiate/Maintain xalarm  - Obtain necessary fall risk management equipment: x  - Apply yellow socks and bracelet for high fall risk patients  - Consider moving patient to room near nurses station  Outcome: Progressing  Goal: Maintain or return to baseline ADL function  Description: INTERVENTIONS:  -  Assess patient's ability to carry out ADLs; assess patient's baseline for ADL function and identify physical deficits which impact ability to perform ADLs (bathing, care of mouth/teeth, toileting, grooming, dressing, etc.)  - Assess/evaluate cause of self-care deficits   - Assess range of motion  - Assess patient's mobility; develop plan if impaired  - Assess patient's need for assistive devices and provide as appropriate  - Encourage maximum independence but intervene and supervise when necessary  - Involve family in performance of ADLs  - Assess for home care needs following discharge   - Consider OT consult to assist with ADL evaluation and planning for discharge  - Provide patient education as appropriate  Outcome: Progressing  Goal: Maintains/Returns to pre admission functional level  Description: INTERVENTIONS:  - Perform AM-PAC 6 Click Basic Mobility/ Daily Activity  assessment daily.  - Set and communicate daily mobility goal to care team and patient/family/caregiver.   - Collaborate with rehabilitation services on mobility goals if consulted  - Perform Range of Motion x times a day.  - Reposition patient every xxxxx hours.  - Dangle patient xx times a day  - Stand patient x times a day  - Ambulate patient x times a day  - Out of bed to chair x times a day   - Out of bed for meals x times a day  - Out of bed for toileting  - Record patient progress and toleration of activity level   Outcome: Progressing     Problem: DISCHARGE PLANNING  Goal: Discharge to home or other facility with appropriate resources  Description: INTERVENTIONS:  - Identify barriers to discharge w/patient and caregiver  - Arrange for needed discharge resources and transportation as appropriate  - Identify discharge learning needs (meds, wound care, etc.)  - Arrange for interpretive services to assist at discharge as needed  - Refer to Case Management Department for coordinating discharge planning if the patient needs post-hospital services based on physician/advanced practitioner order or complex needs related to functional status, cognitive ability, or social support system  Outcome: Progressing     Problem: Knowledge Deficit  Goal: Patient/family/caregiver demonstrates understanding of disease process, treatment plan, medications, and discharge instructions  Description: Complete learning assessment and assess knowledge base.  Interventions:  - Provide teaching at level of understanding  - Provide teaching via preferred learning methods  Outcome: Progressing     Problem: Potential for Falls  Goal: Patient will remain free of falls  Description: INTERVENTIONS:  - Educate patient/family on patient safety including physical limitations  - Instruct patient to call for assistance with activity   - Consult OT/PT to assist with strengthening/mobility   - Keep Call bell within reach  - Keep bed low and locked with  side rails adjusted as appropriate  - Keep care items and personal belongings within reach  - Initiate and maintain comfort rounds  - Make Fall Risk Sign visible to staff  - Offer Toileting every x Hours, in advance of need  - Initiate/Maintain xalarm  - Obtain necessary fall risk management equipment: x  - Apply yellow socks and bracelet for high fall risk patients  - Consider moving patient to room near nurses station  Outcome: Progressing

## 2024-02-08 NOTE — PROGRESS NOTES
Report called to [facility] SHAHANA Patiño - All paperwork with printed script sent with transport team

## 2024-02-12 ENCOUNTER — TELEPHONE (OUTPATIENT)
Dept: NEPHROLOGY | Facility: CLINIC | Age: 64
End: 2024-02-12

## 2024-02-12 NOTE — TELEPHONE ENCOUNTER
Placed call to pt to return vm that was left to schedule follow up with Dr. Mills for May, I offered to schedule with Naif pt would like to see Dr. Mills, pt will be added to list for Moriah

## 2024-02-13 ENCOUNTER — APPOINTMENT (OUTPATIENT)
Dept: PHYSICAL THERAPY | Facility: CLINIC | Age: 64
End: 2024-02-13
Payer: MEDICARE

## 2024-02-15 ENCOUNTER — APPOINTMENT (OUTPATIENT)
Dept: RADIOLOGY | Facility: CLINIC | Age: 64
End: 2024-02-15
Payer: MEDICARE

## 2024-02-15 ENCOUNTER — TELEPHONE (OUTPATIENT)
Dept: OBGYN CLINIC | Facility: HOSPITAL | Age: 64
End: 2024-02-15

## 2024-02-15 ENCOUNTER — APPOINTMENT (OUTPATIENT)
Dept: PHYSICAL THERAPY | Facility: CLINIC | Age: 64
End: 2024-02-15
Payer: MEDICARE

## 2024-02-15 ENCOUNTER — TELEPHONE (OUTPATIENT)
Age: 64
End: 2024-02-15

## 2024-02-15 ENCOUNTER — OFFICE VISIT (OUTPATIENT)
Dept: OBGYN CLINIC | Facility: CLINIC | Age: 64
End: 2024-02-15

## 2024-02-15 VITALS
HEIGHT: 64 IN | SYSTOLIC BLOOD PRESSURE: 122 MMHG | BODY MASS INDEX: 36.88 KG/M2 | WEIGHT: 216 LBS | DIASTOLIC BLOOD PRESSURE: 80 MMHG

## 2024-02-15 DIAGNOSIS — Z96.652 AFTERCARE FOLLOWING LEFT KNEE JOINT REPLACEMENT SURGERY: ICD-10-CM

## 2024-02-15 DIAGNOSIS — Z96.652 AFTERCARE FOLLOWING LEFT KNEE JOINT REPLACEMENT SURGERY: Primary | ICD-10-CM

## 2024-02-15 DIAGNOSIS — Z47.1 AFTERCARE FOLLOWING LEFT KNEE JOINT REPLACEMENT SURGERY: Primary | ICD-10-CM

## 2024-02-15 DIAGNOSIS — Z47.1 AFTERCARE FOLLOWING LEFT KNEE JOINT REPLACEMENT SURGERY: ICD-10-CM

## 2024-02-15 PROCEDURE — 73562 X-RAY EXAM OF KNEE 3: CPT

## 2024-02-15 PROCEDURE — 99024 POSTOP FOLLOW-UP VISIT: CPT | Performed by: ORTHOPAEDIC SURGERY

## 2024-02-15 RX ORDER — OXYCODONE HYDROCHLORIDE 5 MG/1
5 TABLET ORAL EVERY 4 HOURS PRN
Qty: 20 TABLET | Refills: 0 | Status: SHIPPED | OUTPATIENT
Start: 2024-02-15

## 2024-02-15 NOTE — ASSESSMENT & PLAN NOTE
Ayleen is doing well status post left total knee arthroplasty on February 5, 2024.  X-rays were reviewed with her that reveal a well aligned prosthesis with no evidence of loosening.  She may get incision wet in the shower.  No soaking for the next 1 to 2 weeks.  She is to start outpatient physical therapy once discharged from the rehab facility.  Continue ice and Tylenol as needed for pain.  She was given 1 more prescription of oxycodone to take for severe pain.  Advised patient she is to wean off the medication over the next few days.  Follow-up in 3 months with repeat x-rays of the left knee.  All questions were answered to patient's satisfaction.

## 2024-02-15 NOTE — TELEPHONE ENCOUNTER
Caller: [facility]    Doctor: Francesca    Reason for call: Calling regarding prescription for oxycodone for patient; pharmacy indicates they require further information to fill order for patient.    Call back#: [phone]

## 2024-02-15 NOTE — LETTER
February 15, 2024     Patient: Ayleen Moralez  YOB: 1960  Date of Visit: 2/15/2024      To Whom it May Concern:    Ayleen Moralez is under my professional care. Ayleen was seen in my office on 2/15/2024. Ayleen can be discharged home once cleared by PT at your facility. WBAT to LLE with assistance. She can get incision wet in the shower. Does not need to be covered. Continue icing with pain medication as needed. Follow up in 3 months.    If you have any questions or concerns, please don't hesitate to call.         Sincerely,          Riki Ma MD        CC: No Recipients

## 2024-02-15 NOTE — PROGRESS NOTES
Assessment:     1. Aftercare following left knee joint replacement surgery        Plan:     Problem List Items Addressed This Visit          Other    Aftercare following left knee joint replacement surgery - Primary     Ayleen is doing well status post left total knee arthroplasty on February 5, 2024.  X-rays were reviewed with her that reveal a well aligned prosthesis with no evidence of loosening.  She may get incision wet in the shower.  No soaking for the next 1 to 2 weeks.  She is to start outpatient physical therapy once discharged from the rehab facility.  Continue ice and Tylenol as needed for pain.  She was given 1 more prescription of oxycodone to take for severe pain.  Advised patient she is to wean off the medication over the next few days.  Follow-up in 3 months with repeat x-rays of the left knee.  All questions were answered to patient's satisfaction.         Relevant Medications    oxyCODONE (Roxicodone) 5 immediate release tablet    Other Relevant Orders    XR knee 3 vw left non injury    Ambulatory Referral to Physical Therapy      Subjective:     Patient ID: Ayleen Moralez is a 63 y.o. female.  Chief Complaint:  This is a 63-year-old white female who is status post left total knee arthroplasty on February 5, 2024.  She is currently ambulating with a walker.  She is residing at the rehab facility, but states she is going home today.  Pain is controlled well overall with the oxycodone as needed.  She is unable to take NSAIDs.  She also takes Tylenol.  She has been doing physical therapy at the rehab facility and feels as if her range of motion is improving.  She denies any fevers or chills.      Allergy:  Allergies   Allergen Reactions    Molds & Smuts Nasal Congestion    Bee Pollen Nasal Congestion     Other reaction(s): Nasal Congestion    Pollen Extract Nasal Congestion     Medications:  all current active meds have been reviewed  Past Medical History:  Past Medical History:   Diagnosis Date     Anxiety     Anxiety disorder     Arthritis     At risk for falls     Bipolar 2 disorder (HCC)     Chronic back pain     Chronic kidney disease     Closed fracture of distal end of right fibula with routine healing 2020    COVID-19     in 2021    CVA (cerebral vascular accident) (HCC)     noted on MRI in the past    Depression     GERD (gastroesophageal reflux disease)     Hypercholesteremia     Hypernatremia     Hypertension     Hypokalemia     Idiopathic chronic pancreatitis (HCC) 2018    Intervertebral disc disorder with radiculopathy of lumbosacral region     resolved: 2015    Kidney disease     Kidney disease     Limb alert care status     LUE-fistula    Panic attacks     Pericardial effusion     PONV (postoperative nausea and vomiting)     Psychiatric problem     Radiculitis     resolved: 2015    Secondary renal hyperparathyroidism (HCC)     Stroke (HCC)     Vitamin D deficiency      Past Surgical History:  Past Surgical History:   Procedure Laterality Date    BUNIONECTOMY      Left foot     CAST APPLICATION Right 2022    Procedure: Application short-arm thumb spica splint;  Surgeon: Lyndon Victoria MD;  Location: [facility];  Service: Orthopedics    COLON SURGERY      COLONOSCOPY  2021    DILATION AND CURETTAGE OF UTERUS      INDUCED       surgically induced    HI ARTERIOVENOUS ANASTOMOSIS OPEN DIRECT Left 2019    Procedure: CREATION FISTULA ARTERIOVENOUS (AV) left wrists possible left upper;  Surgeon: Andrey Quintero MD;  Location: [facility];  Service: Vascular    HI ARTHRP KNE CONDYLE&PLATU MEDIAL&LAT COMPARTMENTS Left 2024    Procedure: ARTHROPLASTY KNEE TOTAL SAME DAY;  Surgeon: Riki Ma MD;  Location: [facility];  Service: Orthopedics    [facility] 2019    Procedure: Serge bunionectomy;  Surgeon: Munir Larkin DPM;  Location: [facility];  Service: Podiatry    HI CORR HLX GS BNCTY SESMD DST  METAR OSTEOT Right 8/3/2020    Procedure: BUNIONECTOMY RAFAEL;  Surgeon: Munir Larkin DPM;  Location: [facility];  Service: Podiatry    OK CORRJ HLX VLGS BNCTY SESMDC PROX PHLX OSTEOT Right 9/27/2021    Procedure: BUNIONECTOMY TAMEKA, right tameka osteotomy and 2nd claw toe correction;  Surgeon: James R Lachman, MD;  Location: [facility];  Service: Orthopedics    OK ERCP DX COLLECTION SPECIMEN BRUSHING/WASHING N/A 4/11/2018    Procedure: ENDOSCOPIC RETROGRADE CHOLANGIOPANCREATOGRAPHY (ERCP);  Surgeon: Alfredo Messina MD;  Location: [facility];  Service: Gastroenterology    OK LAPAROSCOPY PROCTOPEXY PROLAPSE N/A 7/13/2018    Procedure: ROBOTIC SIGMOID RESECTION / RECTOPEXY;  Surgeon: ELPIDIO Mcnulty MD;  [facility]: [facility];  Service: Colorectal    OK OPEN TREATMENT RADIAL SHAFT FRACTURE Right 10/11/2021    Procedure: OPEN REDUCTION W/ INTERNAL FIXATION (ORIF) RADIUS (WRIST), RIGHT DISTAL;  Surgeon: Riki Ma MD;  Location: [facility];  Service: Orthopedics    OK REMOVAL IMPLANT DEEP Right 6/23/2022    Procedure: Removal of hardware volar aspect right distal radius (distal radial plate and screws);  Surgeon: Lyndon Victoria MD;  Location: [facility];  Service: Orthopedics    OK SIGMOIDOSCOPY FLX DX W/COLLJ SPEC BR/WA IF PFRMD N/A 7/13/2018    Procedure: SIGMOIDOSCOPY FLEXIBLE;  Surgeon: ELPIDIO Mcnulty MD;  [facility];  Service: Colorectal    OK TR TDN RESTORE INTRNSC FUNCJ ALL 4 FNGRS Right 6/23/2022    Procedure: Right ring finger flexor digitorum superficialis to flexor pollicis longus tendon transfer;  Surgeon: Lyndon Victoria MD;  Location: [facility];  Service: Orthopedics    TUBAL LIGATION Bilateral 1997    US GUIDED THYROID BIOPSY  7/30/2019     Family History:  Family History   Problem Relation Age of Onset    Bipolar disorder Mother     Mental illness Mother         depression    Stroke Mother     Dementia Mother     Colon polyps Mother     Heart disease Father     Hypertension  "Father     Diabetes Father     Other Family         Back disorder    Diabetes Family     Heart disease Family     Hypertension Family     Stroke Family     Thyroid disease Family     Breast cancer Paternal Grandmother         age unknown    Breast cancer Paternal Aunt         age unknown    Breast cancer Maternal Aunt         age unknown    Mental illness Sister     Colon polyps Sister     Mental illness Sister     Heart disease Sister     No Known Problems Sister     Breast cancer Sister 68    Other Son         pituitary tumor    Hypertension Son     Obesity Son     No Known Problems Son     No Known Problems Maternal Grandmother     No Known Problems Maternal Grandfather     No Known Problems Paternal Grandfather     Breast cancer Paternal Aunt         age unknown    Substance Abuse Neg Hx         neg fam hx    Colon cancer Neg Hx      Social History:  Social History     Substance and Sexual Activity   Alcohol Use Not Currently     Social History     Substance and Sexual Activity   Drug Use Not Currently    Types: Hydrocodone, Marijuana    Comment: MEDICATION   Ayleen has h/o marijuana abuse- not currently     Social History     Tobacco Use   Smoking Status Never   Smokeless Tobacco Never     Review of Systems   Constitutional: Negative.    HENT: Negative.     Eyes: Negative.    Respiratory: Negative.     Cardiovascular: Negative.    Gastrointestinal: Negative.    Endocrine: Negative.    Genitourinary: Negative.    Musculoskeletal:  Positive for arthralgias (left knee), gait problem (using a walker) and joint swelling (left knee).   Skin: Negative.    Allergic/Immunologic: Negative.    Hematological: Negative.    Psychiatric/Behavioral: Negative.           Objective:  BP Readings from Last 1 Encounters:   02/15/24 122/80      Wt Readings from Last 1 Encounters:   02/15/24 98 kg (216 lb)      BMI:   Estimated body mass index is 37.08 kg/m² as calculated from the following:    Height as of this encounter: 5' 4\" " "(1.626 m).    Weight as of this encounter: 98 kg (216 lb).  BSA:   Estimated body surface area is 2.02 meters squared as calculated from the following:    Height as of this encounter: 5' 4\" (1.626 m).    Weight as of this encounter: 98 kg (216 lb).   Physical Exam  Constitutional:       General: She is not in acute distress.     Appearance: She is well-developed.   HENT:      Head: Normocephalic.   Eyes:      Conjunctiva/sclera: Conjunctivae normal.      Pupils: Pupils are equal, round, and reactive to light.   Pulmonary:      Effort: Pulmonary effort is normal. No respiratory distress.   Skin:     General: Skin is warm and dry.   Neurological:      Mental Status: She is alert and oriented to person, place, and time.   Psychiatric:         Behavior: Behavior normal.       Left Knee Exam     Range of Motion   Extension:  -5   Flexion:  110     Tests   Varus: negative Valgus: negative    Other   Erythema: absent  Scars: present (Well-healing incision with no evidence of infection.  No active drainage.)  Sensation: normal  Pulse: present  Swelling: mild    Comments:  Calf soft, nontender            I have personally reviewed pertinent films in PACS and my interpretation is x-rays of left knee reveal a well aligned prosthesis with no evidence of loosening.  "

## 2024-02-15 NOTE — TELEPHONE ENCOUNTER
Caller: patient     Doctor: Francesca    Reason for call: patient asking for refill of med that she lost,  can't recall the name of it.  I read off the list prescribed 2/5, she said its not any of those medications.  Is aware the Oxycodone was re-ordered today.     Call back#:

## 2024-02-15 NOTE — TELEPHONE ENCOUNTER
I spoke with pharmacist [facility].  There was another script for the same medication filled by [facility] yesterday..5 day supply.   Previously on 2/8 there was a 10 day supply filled.  Per Yari I told Fela to cancel our script for oxycodone.

## 2024-02-15 NOTE — TELEPHONE ENCOUNTER
Caller: Patient    Doctor: Francesca    Reason for call: Ayleen would like to know if you could put in an scraipt for grab bars for her shower through [employer]    Call back#: [phone]

## 2024-02-16 NOTE — TELEPHONE ENCOUNTER
Caller: Ayleen    Doctor: Francesca     Reason for call: Calling about rx that was canceled at the pharmacy yesterday     Call back#: [phone]

## 2024-02-19 NOTE — TELEPHONE ENCOUNTER
I spoke to Ayleen and let her know that the pharmacy alerted our office that there were multiple scripts for Oxycodone sent to be filled.  Our script was cx by Yari (SHASHANK) due to this issue.  Ayleen said she is using Tramadol with now relief however she is starting PT and being released from rehab today.

## 2024-02-19 NOTE — TELEPHONE ENCOUNTER
At this point we want her off the oxycodone. She can continue the tramadol for severe pain. Continue tylenol during the day as well.

## 2024-02-20 ENCOUNTER — TELEPHONE (OUTPATIENT)
Dept: PSYCHIATRY | Facility: CLINIC | Age: 64
End: 2024-02-20

## 2024-02-20 ENCOUNTER — APPOINTMENT (OUTPATIENT)
Dept: PHYSICAL THERAPY | Facility: CLINIC | Age: 64
End: 2024-02-20
Payer: MEDICARE

## 2024-02-20 DIAGNOSIS — F31.2 BIPOLAR I DISORDER, CURRENT OR MOST RECENT EPISODE MANIC, SEVERE WITH MOOD-INCONGRUENT PSYCHOTIC FEATURES (HCC): Primary | ICD-10-CM

## 2024-02-20 DIAGNOSIS — F42.9 OBSESSIVE-COMPULSIVE DISORDER, UNSPECIFIED TYPE: ICD-10-CM

## 2024-02-20 DIAGNOSIS — F31.2 BIPOLAR I DISORDER, CURRENT OR MOST RECENT EPISODE MANIC, SEVERE WITH MOOD-INCONGRUENT PSYCHOTIC FEATURES (HCC): ICD-10-CM

## 2024-02-20 NOTE — TELEPHONE ENCOUNTER
Patient calling because she recently had knee surgery and then was in rehab following the surgery. While in rehab, patient states they changed her medication. Patient states she is on edge now and would like to discuss meds with you. Unable to drive so cannot come in for appt but also cannot accommodate a video appointment either. Would like if you could call her to discuss since she cannot come in

## 2024-02-21 ENCOUNTER — TRANSITIONAL CARE MANAGEMENT (OUTPATIENT)
Dept: FAMILY MEDICINE CLINIC | Facility: HOSPITAL | Age: 64
End: 2024-02-21

## 2024-02-21 RX ORDER — HALOPERIDOL 2 MG/1
TABLET ORAL
Qty: 60 TABLET | Refills: 2 | Status: SHIPPED | OUTPATIENT
Start: 2024-02-21

## 2024-02-21 RX ORDER — HALOPERIDOL 10 MG/1
TABLET ORAL
Qty: 60 TABLET | Refills: 2 | Status: SHIPPED | OUTPATIENT
Start: 2024-02-21

## 2024-02-21 NOTE — TELEPHONE ENCOUNTER
Pt Ayleen Moralez , 1960 , [facility] chart was reviewed. S/p arthroplasty pod #16. Spoke to Ayleen , reported Haldol and Seroquel was stop cause of recent procedure, otherwise she remained on lamotrigine 400mg, Luvox 300mg. There was complaints of agitation, irritability with changes . Ayleen Moralez  was instructed to restart haldol 10mg (morning + night); 2mg PRN and Seroquel 400mg at night . Discontinue Seroquel 300mg in the morning.    Medications Prescribed During [facility] Encounter:   -     haloperidol (HALDOL) 10 mg tablet; Haldoperidol 10m tablet in the morning and 1 tablet at night  -     haloperidol (HALDOL) 2 mg tablet; Haloperidol 2m tablet po daily in the afternoon, 1 tablet PRN    DSM 5:   1. Bipolar I disorder, current or most recent episode manic, severe with mood-incongruent psychotic features (HCC)    2. Obsessive-compulsive disorder, unspecified type         Medication List:  Current Outpatient Medications on File Prior to Visit   Medication Sig Dispense Refill    acetaminophen (TYLENOL) 500 mg tablet Take 2 tablets (1,000 mg total) by mouth every 8 (eight) hours 60 tablet 0    albuterol (Ventolin HFA) 90 mcg/act inhaler Inhale 2 puffs every 6 (six) hours as needed for wheezing or shortness of breath 8.5 g 3    alendronate (Fosamax) 70 mg tablet Take 1 tablet (70 mg total) by mouth every 7 days 4 tablet 5    AMILoride 5 mg tablet Take 1 tablet (5 mg total) by mouth 2 (two) times a day (Patient taking differently: Take 5 mg by mouth 2 (two) times a day) 180 tablet 3    amLODIPine (NORVASC) 5 mg tablet Take 1 tablet (5 mg total) by mouth daily 90 tablet 3    ascorbic acid (VITAMIN C) 500 MG tablet Take 1 tablet (500 mg total) by mouth 2 (two) times a day 60 tablet 2    aspirin 81 mg chewable tablet Chew 1 tablet (81 mg total) 2 (two) times a day 60 tablet 0    budesonide-formoterol (Symbicort) 160-4.5 mcg/act inhaler Inhale 2 puffs 2 (two) times a day Rinse mouth after use. 10.2 g 11     carvedilol (COREG) 25 mg tablet Take 1 tablet (25 mg total) by mouth 2 (two) times a day with meals 180 tablet 3    cholecalciferol (VITAMIN D3) 1,000 units tablet Take 5 tablets (5,000 Units total) by mouth daily 90 tablet 5    clonazePAM (KlonoPIN) 0.5 mg tablet Take 1 tablet (0.5 mg total) by mouth 3 (three) times a day 270 tablet 0    Cyanocobalamin (VITAMIN B 12 PO) Take 1,000 mcg by mouth in the morning      ferrous sulfate 324 (65 Fe) mg Take 1 tablet (324 mg total) by mouth 2 (two) times a day before meals 60 tablet 2    fluvoxaMINE (LUVOX) 100 mg tablet Fluvoxamine 100m tablet in the morning ; 2 tablets at night 270 tablet 1    folic acid (FOLVITE) 1 mg tablet Take 1 tablet (1 mg total) by mouth daily 30 tablet 2    haloperidol (HALDOL) 10 mg tablet Haldoperidol 10m tablet in the morning and 1 tablet at night (Patient taking differently: Take 10 mg by mouth 2 (two) times a day Haldoperidol 10m tablet in the morning and 1 tablet at night) 30 tablet 1    haloperidol (HALDOL) 2 mg tablet Haloperidol 2m tablet po daily in the afternoon, 1 tablet PRN 60 tablet 1    lamoTRIgine (LaMICtal) 200 MG tablet Lamotrigine 200m tablet in the morning and 1 tablet at night 180 tablet 0    Multiple Vitamin (MULTI-VITAMIN) tablet Take 1 tablet by mouth daily 30 tablet 2    omeprazole (PriLOSEC) 40 MG capsule Take 1 capsule (40 mg total) by mouth daily before breakfast 90 capsule 1    ondansetron (ZOFRAN) 4 mg tablet Take 1 tablet (4 mg total) by mouth every 8 (eight) hours as needed for nausea or vomiting 30 tablet 0    oxyCODONE (Roxicodone) 5 immediate release tablet Take 1 tablet (5 mg total) by mouth every 4 (four) hours as needed for severe pain Continue current therapy. Max Daily Amount: 30 mg 20 tablet 0    QUEtiapine (SEROquel) 400 MG tablet Quetiapine Fumarate 400m tablet at night 90 tablet 0    rosuvastatin (CRESTOR) 20 MG tablet Take 1 tablet (20 mg total) by mouth every evening 90 tablet 3     traMADol (Ultram) 50 mg tablet Take 1 tablet (50 mg total) by mouth every 12 (twelve) hours as needed for moderate pain 60 tablet 0    [DISCONTINUED] QUEtiapine (SEROquel) 300 mg tablet Quetiapine 300m tablet po  in morning (Patient taking differently: Take 300 mg by mouth in the morning Quetiapine 300m tablet po  in morning) 90 tablet 0     No current facility-administered medications on file prior to visit.        This note was not shared with the patient due to reasonable likelihood of causing patient harm     This note may have been written with the assistance of dictation software. Please excuse any grammatical  errors, misspellings,  and abnormal spacing of letters , sentences or paragraphs . For accurate interpretation should read note horizontally

## 2024-02-22 ENCOUNTER — APPOINTMENT (OUTPATIENT)
Dept: PHYSICAL THERAPY | Facility: CLINIC | Age: 64
End: 2024-02-22
Payer: MEDICARE

## 2024-02-23 ENCOUNTER — TELEPHONE (OUTPATIENT)
Dept: NEPHROLOGY | Facility: CLINIC | Age: 64
End: 2024-02-23

## 2024-02-26 ENCOUNTER — TELEPHONE (OUTPATIENT)
Age: 64
End: 2024-02-26

## 2024-02-26 ENCOUNTER — RA CDI HCC (OUTPATIENT)
Dept: OTHER | Facility: HOSPITAL | Age: 64
End: 2024-02-26

## 2024-02-26 NOTE — PROGRESS NOTES
E22.1  HCC coding opportunities          Chart Reviewed number of suggestions sent to Provider: 1     Patients Insurance     Medicare Insurance: Medicare

## 2024-02-26 NOTE — TELEPHONE ENCOUNTER
Caller: Ayleen    Doctor: Francesca    Reason for call: patient would like to know if she may take shower and walk up the steps  Please advise    Call back#: [phone]

## 2024-02-27 ENCOUNTER — APPOINTMENT (OUTPATIENT)
Dept: PHYSICAL THERAPY | Facility: CLINIC | Age: 64
End: 2024-02-27
Payer: MEDICARE

## 2024-02-27 ENCOUNTER — OFFICE VISIT (OUTPATIENT)
Dept: PHYSICAL THERAPY | Facility: CLINIC | Age: 64
End: 2024-02-27
Payer: MEDICARE

## 2024-02-27 DIAGNOSIS — M17.12 PRIMARY OSTEOARTHRITIS OF LEFT KNEE: Primary | ICD-10-CM

## 2024-02-27 PROCEDURE — 97110 THERAPEUTIC EXERCISES: CPT | Performed by: PHYSICAL THERAPIST

## 2024-02-27 PROCEDURE — 97112 NEUROMUSCULAR REEDUCATION: CPT | Performed by: PHYSICAL THERAPIST

## 2024-02-27 PROCEDURE — 97140 MANUAL THERAPY 1/> REGIONS: CPT | Performed by: PHYSICAL THERAPIST

## 2024-02-27 NOTE — PROGRESS NOTES
"Daily Note     Today's date: 2024  Patient name: Ayleen Moralez  : 1960  MRN: [id number]  Referring provider: Yari Wallace PA-C  Dx:   Encounter Diagnosis     ICD-10-CM    1. Primary osteoarthritis of left knee  M17.12         Subjective: in lots of pain since leaving rehab hospital, presents with FWW and antalgic gait pattern       Objective: See treatment diary below      Assessment: Tolerated treatment well with initiation of full treatment program as noted below requiring verbal and tactile cues from PT for safe execution of therapeutic exercise. Patient demonstrated fatigue post treatment, exhibited good technique with therapeutic exercises, and would benefit from continued PT      Plan: Continue per plan of care.  Progress treatment as tolerated.       L TKA 2024    EPOC: 3/29/24      Manuals             L Knee PROM NS            Gentle L tibiofemoral mobs                                       Neuro Re-Ed             L quad set X25 5\"            Heel slide (knee flexion f/b hip abd/add) X25 ea            SLR iso x25             SAQ iso x25            LAQ iso x25            Reverse squat slant                          Ther Ex             HR x25            TR x25            Slant board 10x10\"            Clam shell supine                                                    L Knee TERT with heat Into EXT             Ther Activity             LE Bike for improved L knee ROM             Pt education: pathoanatomy, nature of sxs, POC, HEP 8' NS            Gait Training                                       Modalities             ICE Prn             Heat Prn                  "

## 2024-02-28 ENCOUNTER — OFFICE VISIT (OUTPATIENT)
Dept: FAMILY MEDICINE CLINIC | Facility: HOSPITAL | Age: 64
End: 2024-02-28
Payer: MEDICARE

## 2024-02-28 VITALS
BODY MASS INDEX: 35.34 KG/M2 | SYSTOLIC BLOOD PRESSURE: 132 MMHG | DIASTOLIC BLOOD PRESSURE: 68 MMHG | WEIGHT: 207 LBS | HEART RATE: 92 BPM | OXYGEN SATURATION: 96 % | HEIGHT: 64 IN

## 2024-02-28 DIAGNOSIS — F31.2 BIPOLAR I DISORDER, CURRENT OR MOST RECENT EPISODE MANIC, SEVERE WITH MOOD-INCONGRUENT PSYCHOTIC FEATURES (HCC): ICD-10-CM

## 2024-02-28 DIAGNOSIS — N18.6 END STAGE RENAL DISEASE (HCC): ICD-10-CM

## 2024-02-28 DIAGNOSIS — I26.99 PULMONARY EMBOLISM WITH INFARCTION (HCC): Primary | ICD-10-CM

## 2024-02-28 DIAGNOSIS — J45.40 MODERATE PERSISTENT ASTHMA WITHOUT COMPLICATION: ICD-10-CM

## 2024-02-28 DIAGNOSIS — N18.4 CKD (CHRONIC KIDNEY DISEASE) STAGE 4, GFR 15-29 ML/MIN (HCC): ICD-10-CM

## 2024-02-28 DIAGNOSIS — N25.81 SECONDARY RENAL HYPERPARATHYROIDISM (HCC): ICD-10-CM

## 2024-02-28 DIAGNOSIS — K86.1 IDIOPATHIC CHRONIC PANCREATITIS (HCC): ICD-10-CM

## 2024-02-28 DIAGNOSIS — F31.4 SEVERE DEPRESSED BIPOLAR I DISORDER WITHOUT PSYCHOTIC FEATURES (HCC): ICD-10-CM

## 2024-02-28 DIAGNOSIS — M54.50 LUMBAR PAIN: ICD-10-CM

## 2024-02-28 DIAGNOSIS — K21.9 GASTROESOPHAGEAL REFLUX DISEASE WITHOUT ESOPHAGITIS: ICD-10-CM

## 2024-02-28 DIAGNOSIS — K59.03 DRUG-INDUCED CONSTIPATION: ICD-10-CM

## 2024-02-28 DIAGNOSIS — J84.9 ILD (INTERSTITIAL LUNG DISEASE) (HCC): ICD-10-CM

## 2024-02-28 DIAGNOSIS — M79.604 BILATERAL LEG PAIN: ICD-10-CM

## 2024-02-28 DIAGNOSIS — E22.1 HYPERPROLACTINEMIA (HCC): ICD-10-CM

## 2024-02-28 DIAGNOSIS — M46.1 BILATERAL SACROILIITIS (HCC): ICD-10-CM

## 2024-02-28 DIAGNOSIS — M79.605 BILATERAL LEG PAIN: ICD-10-CM

## 2024-02-28 DIAGNOSIS — I10 ESSENTIAL HYPERTENSION: ICD-10-CM

## 2024-02-28 DIAGNOSIS — I35.0 MODERATE AORTIC STENOSIS: ICD-10-CM

## 2024-02-28 PROCEDURE — 99495 TRANSJ CARE MGMT MOD F2F 14D: CPT | Performed by: INTERNAL MEDICINE

## 2024-02-28 RX ORDER — TRAMADOL HYDROCHLORIDE 50 MG/1
50 TABLET ORAL EVERY 12 HOURS PRN
Qty: 60 TABLET | Refills: 0 | Status: SHIPPED | OUTPATIENT
Start: 2024-02-28

## 2024-02-28 NOTE — PROGRESS NOTES
Assessment & Plan     1. Pulmonary embolism with infarction ([facility])  Assessment & Plan:  Now on aspirin 81 mg daily    Orders:  -     Ambulatory Referral to Pulmonology; Future    2. Moderate aortic stenosis    3. CKD (chronic kidney disease) stage 4, GFR 15-29 ml/min ([facility])    4. Essential hypertension  Assessment & Plan:  Well controlled during hospital stay      5. Idiopathic chronic pancreatitis ([facility])  Assessment & Plan:  Was nauseous on Monday- eating ok now      6. Gastroesophageal reflux disease without esophagitis  Assessment & Plan:  On omeparzole and zofran      7. Hyperprolactinemia ([facility])  Assessment & Plan:  Seeing Dr. Shah in [place] in past-  for rheumtology      8. Moderate persistent asthma without complication    9. ILD (interstitial lung disease) ([facility])  Assessment & Plan:  Sees pulmonary in past   Using symbicort and albuterol    Orders:  -     Ambulatory Referral to Pulmonology; Future    10. End stage renal disease ([facility])  Assessment & Plan:  Lab Results   Component Value Date    EGFR 20 02/07/2024    EGFR 21 02/06/2024    EGFR 19 02/05/2024    CREATININE 2.44 (H) 02/07/2024    CREATININE 2.33 (H) 02/06/2024    CREATININE 2.55 (H) 02/05/2024   Has shunt- has not needed dialysis           Subjective     Transitional Care Management Review:   Ayleen Moralez is a 63 y.o. female here for TCM follow up.     During the TCM phone call patient stated:  TCM Call     Date and time call was made  11/17/2023  1:53 PM    Hospital care reviewed  Records not available    Patient was hospitialized at  [facility]    Date of Admission  02/05/24    Date of discharge  02/08/24  Transfered to [facility]    Diagnosis  Left total knee replacement    Disposition  Home  discharged to home 2/20/24    Were the patients medications reviewed and updated  Yes    Current Symptoms  None    Vomitting severity  Mild      TCM Call     Post hospital issues  None    Should patient be enrolled in anticoag monitoring?   "No    Scheduled for follow up?  Yes    Patients specialists  Endocrinologist; Nephrologist    Nephrologist name  Arturo Mills, DO     Other specialists names  Alfredo Messina MD     Did you obtain your prescribed medications  Yes    Do you need help managing your prescriptions or medications  No    Is transportation to your appointment needed  No    I have advised the patient to call PCP with any new or worsening symptoms  Nati Muhammad MA - SLQ    Living Arrangements  Alone    Support System  Friends; Family    Are you recieving any outpatient services  No    Are you recieving home care services  No    Comments  SPOKE TO PT.  DOING BETTER.  MEDS REVIEWED, CHART CURRENT.  SENT TO FRONT TO SCHED TCM. DK         Here for tcm- had left  [facility]-2/5/24- then to [facility] for rehab x2 weeks-  She reports she did not get much PT time-has ongoing pain and numbness in top of knee=    [facility]  Using a rolling walker- staying on ground floor - has a powder room but not a shower on that floor    she did see Dr. Ma       Review of Systems   All other systems reviewed and are negative.      Objective     /68   Pulse 92   Ht 5' 4\" (1.626 m)   Wt 93.9 kg (207 lb)   LMP  (LMP Unknown)   SpO2 96%   BMI 35.53 kg/m²      Physical Exam  Vitals and nursing note reviewed.   Constitutional:       General: She is not in acute distress.  HENT:      Head: Normocephalic and atraumatic.      Right Ear: Tympanic membrane normal.      Left Ear: Tympanic membrane normal.      Nose: No congestion.   Eyes:      General:         Right eye: No discharge.         Left eye: No discharge.   Cardiovascular:      Rate and Rhythm: Normal rate and regular rhythm.      Heart sounds: No murmur heard.  Pulmonary:      Breath sounds: No wheezing or rhonchi.   Abdominal:      Tenderness: There is no guarding.      Hernia: No hernia is present.   Skin:     Findings: No erythema.   Neurological:      " General: No focal deficit present.      Mental Status: She is alert.       Medications have been reviewed by provider in current encounter    Bette Harp DO

## 2024-02-28 NOTE — ASSESSMENT & PLAN NOTE
Lab Results   Component Value Date    EGFR 20 02/07/2024    EGFR 21 02/06/2024    EGFR 19 02/05/2024    CREATININE 2.44 (H) 02/07/2024    CREATININE 2.33 (H) 02/06/2024    CREATININE 2.55 (H) 02/05/2024   Has shunt- has not needed dialysis

## 2024-02-29 ENCOUNTER — APPOINTMENT (OUTPATIENT)
Dept: PHYSICAL THERAPY | Facility: CLINIC | Age: 64
End: 2024-02-29
Payer: MEDICARE

## 2024-02-29 ENCOUNTER — OFFICE VISIT (OUTPATIENT)
Dept: PHYSICAL THERAPY | Facility: CLINIC | Age: 64
End: 2024-02-29
Payer: MEDICARE

## 2024-02-29 DIAGNOSIS — M17.12 PRIMARY OSTEOARTHRITIS OF LEFT KNEE: Primary | ICD-10-CM

## 2024-02-29 PROCEDURE — 97140 MANUAL THERAPY 1/> REGIONS: CPT

## 2024-02-29 PROCEDURE — 97110 THERAPEUTIC EXERCISES: CPT

## 2024-02-29 PROCEDURE — 97112 NEUROMUSCULAR REEDUCATION: CPT

## 2024-02-29 NOTE — PROGRESS NOTES
"Daily Note     Today's date: 2024  Patient name: Ayleen Moralze  : 1960  MRN: [id number]  Referring provider: Yari Wallace PA-C  Dx:   Encounter Diagnosis     ICD-10-CM    1. Primary osteoarthritis of left knee  M17.12                      Subjective: Pt reports she is not sleeping well and has pain during the night.      Objective: See treatment diary below      Assessment: Tolerated treatment well. Patient demonstrated fatigue post treatment and would benefit from continued PT to cont' work on LE strengthening and knee ROM.  Pt is making good progress w/ ROM w/ no guarding during PROM.      Plan: Continue per plan of care.  Progress treatment as tolerated.       L TKA 2024    EPOC: 3/29/24      Manuals            L Knee PROM NS JK           Gentle L tibiofemoral mobs                                       Neuro Re-Ed             L quad set X25 5\" X25 5\"           Heel slide (knee flexion f/b hip abd/add) X25 ea X25 ea           SLR iso x25  x25           SAQ iso x25 X25 5\"           LAQ iso x25 X25 5\"           Reverse squat slant                          Ther Ex             HR x25 25           TR x25 25           Slant board 10x10\" 1'           Clam shell supine                                                    L Knee TERT with heat Into EXT             Ther Activity             LE Bike for improved L knee ROM             Pt education: pathoanatomy, nature of sxs, POC, HEP 8' NS            Gait Training  24 ft x2           SPC                           Modalities             ICE Prn             Heat Prn                    "

## 2024-03-01 ENCOUNTER — TELEPHONE (OUTPATIENT)
Dept: FAMILY MEDICINE CLINIC | Facility: HOSPITAL | Age: 64
End: 2024-03-01

## 2024-03-05 ENCOUNTER — OFFICE VISIT (OUTPATIENT)
Dept: PHYSICAL THERAPY | Facility: CLINIC | Age: 64
End: 2024-03-05
Payer: MEDICARE

## 2024-03-05 ENCOUNTER — APPOINTMENT (OUTPATIENT)
Dept: PHYSICAL THERAPY | Facility: CLINIC | Age: 64
End: 2024-03-05
Payer: MEDICARE

## 2024-03-05 DIAGNOSIS — M17.12 PRIMARY OSTEOARTHRITIS OF LEFT KNEE: Primary | ICD-10-CM

## 2024-03-05 PROCEDURE — 97140 MANUAL THERAPY 1/> REGIONS: CPT

## 2024-03-05 PROCEDURE — 97110 THERAPEUTIC EXERCISES: CPT

## 2024-03-05 NOTE — PROGRESS NOTES
"Daily Note     Today's date: 3/5/2024  Patient name: Ayleen Moralez  : 1960  MRN: [id number]  Referring provider: Yari Wallace PA-C  Dx:   Encounter Diagnosis     ICD-10-CM    1. Primary osteoarthritis of left knee  M17.12                      Subjective:  Patient states that she went up the stairs for the first time today and is having some increase in pain.        Objective: See treatment diary below      Assessment: Tolerated treatment fair. Continued with POC. She was limited in today's session secondary to subjective. Good tolerance to PROM. Concluded with heat today per patient request.  Patient demonstrated fatigue post treatment and would benefit from continued PT      Plan: Continue per plan of care.      L TKA 2024    EPOC: 3/29/24      Manuals 2/27 2/29 3/5          L Knee PROM NS JK RA          Gentle L tibiofemoral mobs                                       Neuro Re-Ed             L quad set X25 5\" X25 5\" 5\" x 25          Heel slide (knee flexion f/b hip abd/add) X25 ea X25 ea x25          SLR iso x25  x25 2x10           SAQ iso x25 X25 5\" X25 5\"          LAQ iso x25 X25 5\"           Reverse squat slant                          Ther Ex             HR x25 25 25          TR x25 25 25          Slant board 10x10\" 1' 10\"x10 was having some pain          Clam shell supine                                                    L Knee TERT with heat Into EXT             Ther Activity             LE Bike for improved L knee ROM             Pt education: pathoanatomy, nature of sxs, POC, HEP 8' NS            Gait Training  24 ft x2           SPC                           Modalities             ICE Prn             Heat Prn                      "

## 2024-03-06 DIAGNOSIS — F31.81 BIPOLAR 2 DISORDER (HCC): Chronic | ICD-10-CM

## 2024-03-06 RX ORDER — CLONAZEPAM 0.5 MG/1
0.5 TABLET ORAL 3 TIMES DAILY
Qty: 270 TABLET | Refills: 0 | Status: SHIPPED | OUTPATIENT
Start: 2024-03-06 | End: 2024-03-07 | Stop reason: SDUPTHER

## 2024-03-07 ENCOUNTER — APPOINTMENT (OUTPATIENT)
Dept: PHYSICAL THERAPY | Facility: CLINIC | Age: 64
End: 2024-03-07
Payer: MEDICARE

## 2024-03-07 DIAGNOSIS — F31.81 BIPOLAR 2 DISORDER (HCC): Chronic | ICD-10-CM

## 2024-03-07 RX ORDER — CLONAZEPAM 0.5 MG/1
0.5 TABLET ORAL 3 TIMES DAILY
Qty: 270 TABLET | Refills: 0 | Status: SHIPPED | OUTPATIENT
Start: 2024-03-07 | End: 2024-06-05

## 2024-03-08 ENCOUNTER — APPOINTMENT (OUTPATIENT)
Dept: PHYSICAL THERAPY | Facility: CLINIC | Age: 64
End: 2024-03-08
Payer: MEDICARE

## 2024-03-12 ENCOUNTER — OFFICE VISIT (OUTPATIENT)
Dept: PHYSICAL THERAPY | Facility: CLINIC | Age: 64
End: 2024-03-12
Payer: MEDICARE

## 2024-03-12 DIAGNOSIS — M17.12 PRIMARY OSTEOARTHRITIS OF LEFT KNEE: Primary | ICD-10-CM

## 2024-03-12 PROCEDURE — 97140 MANUAL THERAPY 1/> REGIONS: CPT | Performed by: PHYSICAL THERAPIST

## 2024-03-12 PROCEDURE — 97112 NEUROMUSCULAR REEDUCATION: CPT | Performed by: PHYSICAL THERAPIST

## 2024-03-12 PROCEDURE — 97110 THERAPEUTIC EXERCISES: CPT | Performed by: PHYSICAL THERAPIST

## 2024-03-12 NOTE — PROGRESS NOTES
"Progress Note    Today's date: 3/12/2024  Patient name: Ayleen Moralez  : 1960  MRN: [id number]  Referring provider: Yari Wallace PA-C  Dx:   Encounter Diagnosis     ICD-10-CM    1. Primary osteoarthritis of left knee  M17.12         Subjective: in lots of pain since leaving rehab hospital, trouble sleeping at night persists at this time.      Objective: See treatment diary below      Assessment: Tolerated treatment well with progression of treatment program as noted below requiring verbal and tactile cues from PT for safe execution of therapeutic exercise. Patient demonstrated fatigue post treatment, exhibited good technique with therapeutic exercises, however pt's full, pain-free L knee mobility & stability remain limited at this time, secondary to TKA, contributing to functional deficits, and would benefit from continued PT      Plan: Continue per plan of care.  Progress treatment as tolerated.  Continue skilled PT 2x/week for 8 weeks      L TKA 2024    EPOC: 2024      Manuals 3/12          L Knee PROM NS          Gentle L tibiofemoral mobs                                 Neuro Re-Ed           L quad set 5\" x 25          Heel slide (knee flexion f/b hip abd/add) x25          SLR iso 2x10           SAQ iso X30 5\"          LAQ iso X25 5\"          Reverse squat slant                      Ther Ex           HR 25          TR 25          Slant board 10\"x10 was having some pain          Clam shell supine                                            L Knee TERT with heat Into EXT 8'          Ther Activity           LE Bike for improved L knee ROM           Pt education: pathoanatomy, nature of sxs, POC, HEP           Gait Training           SPC                       Modalities           ICE Prn           Heat Prn             "

## 2024-03-13 ENCOUNTER — NURSE TRIAGE (OUTPATIENT)
Age: 64
End: 2024-03-13

## 2024-03-13 NOTE — TELEPHONE ENCOUNTER
"Pt called with complaints of right foot swelling. Said there is pain 4/10. She said she had the same issue 2 years ago, but doesn't remember what she had done for it. I recommended home care. Told her to elevate her legs, and try laying down 2x a day for 20 minutes. Avoid elastic socks. I told her if the swelling becomes worse, to call back. Patient did state she had Surgery on left leg, knee replacement 2/5  but is experiencing right foot and ankle are swollen. She goes to physical therapy 2x a week. She had PT yesterday.         Reason for Disposition   Small area of localized swelling and itchy    Answer Assessment - Initial Assessment Questions  1. ONSET: \"When did the swelling start?\" (e.g., minutes, hours, days)      Today swelling on the right foot  2. LOCATION: \"What part of the leg is swollen?\"  \"Are both legs swollen or just one leg?\"      Right foot, up to ankle  3. SEVERITY: \"How bad is the swelling?\" (e.g., localized; mild, moderate, severe)   - Localized - small area of swelling localized to one leg   - MILD pedal edema - swelling limited to foot and ankle, pitting edema < 1/4 inch (6 mm) deep, rest and elevation eliminate most or all swelling   - MODERATE edema - swelling of lower leg to knee, pitting edema > 1/4 inch (6 mm) deep, rest and elevation only partially reduce swelling   - SEVERE edema - swelling extends above knee, facial or hand swelling present       mild  4. REDNESS: \"Does the swelling look red or infected?\"      Redness near toes  5. PAIN: \"Is the swelling painful to touch?\" If Yes, ask: \"How painful is it?\"   (Scale 1-10; mild, moderate or severe)      Yes, 4/10  6. FEVER: \"Do you have a fever?\" If Yes, ask: \"What is it, how was it measured, and when did it start?\"       denies  7. CAUSE: \"What do you think is causing the leg swelling?\"      denies  8. MEDICAL HISTORY: \"Do you have a history of heart failure, kidney disease, liver failure, or cancer?\"      Kidney disease stage 4  9. " "RECURRENT SYMPTOM: \"Have you had leg swelling before?\" If Yes, ask: \"When was the last time?\" \"What happened that time?\"      Yes, 2 years ago, denies  10. OTHER SYMPTOMS: \"Do you have any other symptoms?\" (e.g., chest pain, difficulty breathing)        denies    Protocols used: Leg Swelling and Edema-ADULT-[place]    "

## 2024-03-14 ENCOUNTER — OFFICE VISIT (OUTPATIENT)
Dept: PHYSICAL THERAPY | Facility: CLINIC | Age: 64
End: 2024-03-14
Payer: MEDICARE

## 2024-03-14 DIAGNOSIS — M17.12 PRIMARY OSTEOARTHRITIS OF LEFT KNEE: Primary | ICD-10-CM

## 2024-03-14 PROCEDURE — 97110 THERAPEUTIC EXERCISES: CPT | Performed by: PHYSICAL THERAPIST

## 2024-03-14 PROCEDURE — 97112 NEUROMUSCULAR REEDUCATION: CPT | Performed by: PHYSICAL THERAPIST

## 2024-03-14 PROCEDURE — 97140 MANUAL THERAPY 1/> REGIONS: CPT | Performed by: PHYSICAL THERAPIST

## 2024-03-14 NOTE — PROGRESS NOTES
"Progress Note    Today's date: 3/14/2024  Patient name: Ayleen Moralez  : 1960  MRN: [id number]  Referring provider: Yari Wallace PA-C  Dx:   Encounter Diagnosis     ICD-10-CM    1. Primary osteoarthritis of left knee  M17.12         Subjective: Compliant with HEP, no questions regarding POC, motivated to continue PT      Objective: See treatment diary below    Assessment: Tolerated treatment well with progression of treatment program as noted below requiring verbal and tactile cues from PT for safe execution of therapeutic exercise. Patient demonstrated fatigue post treatment, exhibited good technique with therapeutic exercises, however pt's full, pain-free L knee mobility & stability remain limited at this time, secondary to TKA, contributing to functional deficits, and would benefit from continued PT      Plan: Continue per plan of care.  Progress treatment as tolerated.       L TKA 2024    EPOC: 2024      Manuals 3/14          L Knee PROM NS          Gentle L tibiofemoral mobs                                 Neuro Re-Ed           L quad set 5\" x 25          Heel slide (knee flexion f/b hip abd/add) x25          SLR iso 2x10           SAQ iso X30 5\"          LAQ iso X25 5\"          Reverse squat slant                      Ther Ex           HR 25          TR 25          Slant board 10\"x10 was having some pain          Clam shell supine                                            L Knee TERT with heat Into EXT 8'          Ther Activity           LE Bike for improved L knee ROM           Pt education: pathoanatomy, nature of sxs, POC, HEP           Gait Training           SPC                       Modalities           ICE Prn           Heat Prn             "

## 2024-03-15 ENCOUNTER — APPOINTMENT (EMERGENCY)
Dept: RADIOLOGY | Facility: HOSPITAL | Age: 64
End: 2024-03-15
Payer: MEDICARE

## 2024-03-15 ENCOUNTER — HOSPITAL ENCOUNTER (EMERGENCY)
Facility: HOSPITAL | Age: 64
Discharge: HOME | End: 2024-03-15
Attending: EMERGENCY MEDICINE
Payer: MEDICARE

## 2024-03-15 VITALS
SYSTOLIC BLOOD PRESSURE: 162 MMHG | HEART RATE: 85 BPM | OXYGEN SATURATION: 97 % | RESPIRATION RATE: 18 BRPM | DIASTOLIC BLOOD PRESSURE: 74 MMHG | TEMPERATURE: 98.1 F

## 2024-03-15 DIAGNOSIS — M25.562 LEFT KNEE PAIN: Primary | ICD-10-CM

## 2024-03-15 PROCEDURE — 73564 X-RAY EXAM KNEE 4 OR MORE: CPT

## 2024-03-15 PROCEDURE — 99284 EMERGENCY DEPT VISIT MOD MDM: CPT | Performed by: EMERGENCY MEDICINE

## 2024-03-15 PROCEDURE — 73610 X-RAY EXAM OF ANKLE: CPT

## 2024-03-15 PROCEDURE — 99283 EMERGENCY DEPT VISIT LOW MDM: CPT

## 2024-03-15 RX ORDER — ACETAMINOPHEN 325 MG/1
975 TABLET ORAL ONCE
Status: COMPLETED | OUTPATIENT
Start: 2024-03-15 | End: 2024-03-15

## 2024-03-15 RX ADMIN — ACETAMINOPHEN 975 MG: 325 TABLET, FILM COATED ORAL at 07:57

## 2024-03-15 NOTE — ED PROVIDER NOTES
History  Chief Complaint   Patient presents with    Leg Injury     Pt to er with reports of tripping and falling last night and injuring her left leg. Reports that she has a knee replacement. Isolated injury. No head strike. On aspirin.      63-year-old female presents for evaluation of left-sided knee and ankle pain after tripping and falling yesterday.  Patient has been able to ambulate and bear weight but was concerned because she recently had knee surgery.  Normal active and passive range of motion.         Prior to Admission Medications   Prescriptions Last Dose Informant Patient Reported? Taking?   AMILoride 5 mg tablet   No No   Sig: Take 1 tablet (5 mg total) by mouth 2 (two) times a day   Patient taking differently: Take 5 mg by mouth 2 (two) times a day   Cyanocobalamin (VITAMIN B 12 PO)   Yes No   Sig: Take 1,000 mcg by mouth in the morning   Multiple Vitamin (MULTI-VITAMIN) tablet   No No   Sig: Take 1 tablet by mouth daily   QUEtiapine (SEROquel) 400 MG tablet   No No   Sig: Quetiapine Fumarate 400m tablet at night   acetaminophen (TYLENOL) 500 mg tablet   No No   Sig: Take 2 tablets (1,000 mg total) by mouth every 8 (eight) hours   albuterol (Ventolin HFA) 90 mcg/act inhaler  Self No No   Sig: Inhale 2 puffs every 6 (six) hours as needed for wheezing or shortness of breath   alendronate (Fosamax) 70 mg tablet   No No   Sig: Take 1 tablet (70 mg total) by mouth every 7 days   amLODIPine (NORVASC) 5 mg tablet   No No   Sig: Take 1 tablet (5 mg total) by mouth daily   ascorbic acid (VITAMIN C) 500 MG tablet   No No   Sig: Take 1 tablet (500 mg total) by mouth 2 (two) times a day   aspirin 81 mg chewable tablet   No No   Sig: Chew 1 tablet (81 mg total) 2 (two) times a day   budesonide-formoterol (Symbicort) 160-4.5 mcg/act inhaler  Self No No   Sig: Inhale 2 puffs 2 (two) times a day Rinse mouth after use.   carvedilol (COREG) 25 mg tablet   No No   Sig: Take 1 tablet (25 mg total) by mouth 2 (two)  times a day with meals   cholecalciferol (VITAMIN D3) 1,000 units tablet  Self No No   Sig: Take 5 tablets (5,000 Units total) by mouth daily   clonazePAM (KlonoPIN) 0.5 mg tablet   No No   Sig: Take 1 tablet (0.5 mg total) by mouth 3 (three) times a day   ferrous sulfate 324 (65 Fe) mg   No No   Sig: Take 1 tablet (324 mg total) by mouth 2 (two) times a day before meals   fluvoxaMINE (LUVOX) 100 mg tablet   No No   Sig: Fluvoxamine 100m tablet in the morning ; 2 tablets at night   Patient taking differently: Fluvoxamine 300 mg  in the morning ;   folic acid (FOLVITE) 1 mg tablet   No No   Sig: Take 1 tablet (1 mg total) by mouth daily   haloperidol (HALDOL) 10 mg tablet   No No   Sig: Haldoperidol 10m tablet in the morning and 1 tablet at night   haloperidol (HALDOL) 2 mg tablet   No No   Sig: Haloperidol 2m tablet po daily in the afternoon, 1 tablet PRN   lamoTRIgine (LaMICtal) 200 MG tablet   No No   Sig: Lamotrigine 200m tablet in the morning and 1 tablet at night   omeprazole (PriLOSEC) 40 MG capsule   No No   Sig: Take 1 capsule (40 mg total) by mouth daily before breakfast   ondansetron (ZOFRAN) 4 mg tablet   No No   Sig: Take 1 tablet (4 mg total) by mouth every 8 (eight) hours as needed for nausea or vomiting   oxyCODONE (Roxicodone) 5 immediate release tablet   No No   Sig: Take 1 tablet (5 mg total) by mouth every 4 (four) hours as needed for severe pain Continue current therapy. Max Daily Amount: 30 mg   rosuvastatin (CRESTOR) 20 MG tablet   No No   Sig: Take 1 tablet (20 mg total) by mouth every evening   traMADol (Ultram) 50 mg tablet   No No   Sig: Take 1 tablet (50 mg total) by mouth every 12 (twelve) hours as needed for moderate pain      Facility-Administered Medications: None       Past Medical History:   Diagnosis Date    Anxiety     Anxiety disorder     Arthritis     At risk for falls     Bipolar 2 disorder (HCC)     Chronic back pain     Chronic kidney disease     Closed  fracture of distal end of right fibula with routine healing 2020    COVID-19     in 2021    CVA (cerebral vascular accident) (HCC)     noted on MRI in the past    Depression     GERD (gastroesophageal reflux disease)     Hypercholesteremia     Hypernatremia     Hypertension     Hypokalemia     Idiopathic chronic pancreatitis (HCC) 2018    Intervertebral disc disorder with radiculopathy of lumbosacral region     resolved: 2015    Kidney disease     Kidney disease     Limb alert care status     LUE-fistula    Panic attacks     Pericardial effusion     PONV (postoperative nausea and vomiting)     Psychiatric problem     Radiculitis     resolved: 2015    Secondary renal hyperparathyroidism (HCC)     Stroke (HCC)     Vitamin D deficiency        Past Surgical History:   Procedure Laterality Date    BUNIONECTOMY      Left foot     CAST APPLICATION Right 2022    Procedure: Application short-arm thumb spica splint;  Surgeon: Lyndon Victoria MD;  [facility]: [facility];  Service: Orthopedics    COLON SURGERY      COLONOSCOPY  2021    DILATION AND CURETTAGE OF UTERUS      INDUCED       surgically induced    TX ARTERIOVENOUS ANASTOMOSIS OPEN DIRECT Left 2019    Procedure: CREATION FISTULA ARTERIOVENOUS (AV) left wrists possible left upper;  Surgeon: Andrey Quintero MD;  [facility];  Service: Vascular    TX ARTHRP KNE CONDYLE&PLATU MEDIAL&LAT COMPARTMENTS Left 2024    Procedure: ARTHROPLASTY KNEE TOTAL SAME DAY;  Surgeon: Riki Ma MD;  [facility];  Service: Orthopedics    [facility]    Procedure: Serge bunionectomy;  Surgeon: Munir Larkin DPM;  [facility];  Service: Podiatry    [facility]    Procedure: BUNIONECTOMY RAFAEL;  Surgeon: Munir Larkin DPM;  [facility]: [facility];  Service: Podiatry    TX [facility] PROX  PHLX OSTEOT Right 9/27/2021    Procedure: BUNIONECTOMY TAMEKA, right tameka osteotomy and 2nd claw toe correction;  Surgeon: James R Lachman, MD;  Location: [facility];  Service: Orthopedics    SC ERCP DX COLLECTION SPECIMEN BRUSHING/WASHING N/A 4/11/2018    Procedure: ENDOSCOPIC RETROGRADE CHOLANGIOPANCREATOGRAPHY (ERCP);  Surgeon: Alfredo Messina MD;  [facility];  Service: Gastroenterology    SC LAPAROSCOPY PROCTOPEXY PROLAPSE N/A 7/13/2018    Procedure: ROBOTIC SIGMOID RESECTION / RECTOPEXY;  Surgeon: ELPIDIO Mcnulty MD;  [facility];  Service: Colorectal    SC OPEN TREATMENT RADIAL SHAFT FRACTURE Right 10/11/2021    Procedure: OPEN REDUCTION W/ INTERNAL FIXATION (ORIF) RADIUS (WRIST), RIGHT DISTAL;  Surgeon: Riki Ma MD;  [facility];  Service: Orthopedics    SC REMOVAL IMPLANT DEEP Right 6/23/2022    Procedure: Removal of hardware volar aspect right distal radius (distal radial plate and screws);  Surgeon: Lyndon Victoria MD;  [facility];  Service: Orthopedics    SC SIGMOIDOSCOPY FLX DX W/COLLJ SPEC BR/WA IF PFRMD N/A 7/13/2018    Procedure: SIGMOIDOSCOPY FLEXIBLE;  Surgeon: ELPIDIO Mcnulty MD;  [facility];  Service: Colorectal    SC TR TDN RESTORE INTRNSC FUNCJ ALL 4 FNGRS Right 6/23/2022    Procedure: Right ring finger flexor digitorum superficialis to flexor pollicis longus tendon transfer;  Surgeon: Lyndon Victoria MD;  [facility];  Service: Orthopedics    TUBAL LIGATION Bilateral 1997    US GUIDED THYROID BIOPSY  7/30/2019       Family History   Problem Relation Age of Onset    Bipolar disorder Mother     Mental illness Mother         depression    Stroke Mother     Dementia Mother     Colon polyps Mother     Heart disease Father     Hypertension Father     Diabetes Father     Other Family         Back disorder    Diabetes Family     Heart disease Family     Hypertension Family     Stroke Family     Thyroid disease Family     Breast  cancer Paternal Grandmother         age unknown    Breast cancer Paternal Aunt         age unknown    Breast cancer Maternal Aunt         age unknown    Mental illness Sister     Colon polyps Sister     Mental illness Sister     Heart disease Sister     No Known Problems Sister     Breast cancer Sister 68    Other Son         pituitary tumor    Hypertension Son     Obesity Son     No Known Problems Son     No Known Problems Maternal Grandmother     No Known Problems Maternal Grandfather     No Known Problems Paternal Grandfather     Breast cancer Paternal Aunt         age unknown    Substance Abuse Neg Hx         neg fam hx    Colon cancer Neg Hx      I have reviewed and agree with the history as documented.    E-Cigarette/Vaping    E-Cigarette Use Never User      E-Cigarette/Vaping Substances    Nicotine No     THC No     CBD No     Flavoring No     Other No     Unknown No      Social History     Tobacco Use    Smoking status: Never    Smokeless tobacco: Never   Vaping Use    Vaping status: Never Used   Substance Use Topics    Alcohol use: Not Currently    Drug use: Not Currently     Types: Hydrocodone, Marijuana     Comment: MEDICATION   Ayleen has h/o marijuana abuse- not currently       Review of Systems   Constitutional:  Negative for fever.   Musculoskeletal:  Positive for arthralgias.       Physical Exam  Physical Exam  Vitals and nursing note reviewed.   Constitutional:       Appearance: She is well-developed.   HENT:      Head: Normocephalic and atraumatic.      Right Ear: External ear normal.      Left Ear: External ear normal.      Nose: Nose normal.   Eyes:      General: No scleral icterus.  Cardiovascular:      Rate and Rhythm: Normal rate.   Pulmonary:      Effort: Pulmonary effort is normal. No respiratory distress.   Abdominal:      General: There is no distension.   Musculoskeletal:         General: Tenderness present. No deformity. Normal range of motion.      Cervical back: Normal range of motion.       Comments: Mildly tender diffusely around left knee.  No evidence of effusion.  Compartments soft.  Intact pulses.  Tender to palpation of bilateral malleoli.  No evidence of edema.  Achilles intact.  Normal passive and active range of motion.   Skin:     Findings: No rash.   Neurological:      General: No focal deficit present.      Mental Status: She is alert and oriented to person, place, and time.   Psychiatric:         Mood and Affect: Mood normal.         Vital Signs  ED Triage Vitals   Temperature Pulse Respirations Blood Pressure SpO2   03/15/24 0711 03/15/24 0715 03/15/24 0716 03/15/24 0715 03/15/24 0715   98.1 °F (36.7 °C) 85 18 162/74 97 %      Temp Source Heart Rate Source Patient Position - Orthostatic VS BP Location FiO2 (%)   03/15/24 0711 03/15/24 0715 03/15/24 0715 03/15/24 0715 --   Temporal Monitor Lying Right arm       Pain Score       --                  Vitals:    03/15/24 0715   BP: 162/74   Pulse: 85   Patient Position - Orthostatic VS: Lying         Visual Acuity      ED Medications  Medications - No data to display    Diagnostic Studies  Results Reviewed       None                   XR knee 4+ vw left injury   ED Interpretation by Pablo Grissom DO (03/15 0749)   No acute osseous abnormality. hardware appears intact.      XR ankle 3+ views LEFT   ED Interpretation by Pablo Grissom DO (03/15 0749)   No acute osseous abnormality                 Procedures  Procedures         ED Course                               SBIRT 22yo+      Flowsheet Row Most Recent Value   Initial Alcohol Screen: US AUDIT-C     1. How often do you have a drink containing alcohol? 0 Filed at: 03/15/2024 0711   2. How many drinks containing alcohol do you have on a typical day you are drinking?  0 Filed at: 03/15/2024 0711   3a. Male UNDER 65: How often do you have five or more drinks on one occasion? 0 Filed at: 03/15/2024 0711   3b. FEMALE Any Age, or MALE 65+: How often do you have 4 or more drinks on one  occassion? 0 Filed at: 03/15/2024 0711   Audit-C Score 0 Filed at: 03/15/2024 0711   ZAYRA: How many times in the past year have you...    Used an illegal drug or used a prescription medication for non-medical reasons? Never Filed at: 03/15/2024 0711                      Medical Decision Making  63-year-old female presenting with mechanical fall that occurred yesterday.  Obtain knee and ankle x-ray.    No acute osseous abnormality.  Follow-up with orthopedics.    Amount and/or Complexity of Data Reviewed  Radiology: ordered and independent interpretation performed.             Disposition  Final diagnoses:   Left knee pain     Time reflects when diagnosis was documented in both MDM as applicable and the Disposition within this note       Time User Action Codes Description Comment    3/15/2024  7:41 AM Pablo Grissom [M25.562] Left knee pain           ED Disposition       ED Disposition   Discharge    Condition   Stable    Date/Time   Fri Mar 15, 2024 0741    Comment   Ayleen Moralez discharge to home/self care.                   Follow-up Information       Follow up With Specialties Details Why Contact Info Additional Information    [facility] [place] [facility]   [address] [id number] [phone] [facility] [address], [facility] [address] [place], [place], [address]  [phone]     [facility] Emergency Department Emergency Medicine  If symptoms worsen [address] [facility] [address]  [phone] [facility] Emergency Department, [address] [place], [place] [address]            Patient's Medications   Discharge Prescriptions    No medications on file       No discharge procedures on file.    PDMP Review         Value Time User    PDMP Reviewed  Yes 3/7/2024  5:01 PM Bette Harp DO            ED  Provider  Electronically Signed by             Pablo Grissom DO  03/15/24 0754

## 2024-03-18 ENCOUNTER — TELEPHONE (OUTPATIENT)
Age: 64
End: 2024-03-18

## 2024-03-18 ENCOUNTER — VBI (OUTPATIENT)
Dept: FAMILY MEDICINE CLINIC | Facility: HOSPITAL | Age: 64
End: 2024-03-18

## 2024-03-18 NOTE — TELEPHONE ENCOUNTER
Caller: Patient    Doctor/Office: Francesca    Call regarding :  Patient called to cancel PT due to a fall.     Call was transferred to: Cindy transferred to [place] JANINA

## 2024-03-18 NOTE — TELEPHONE ENCOUNTER
Caller: Patient    Doctor: Francesca    Reason for call: Patient is calling stating she fell 4 times on Friday on her knee (surgery 2/5) and went to the ER. She stated the xrays didn't show anything but would like to know if she should be seen or what is recommended.    Call back#: [phone]

## 2024-03-18 NOTE — TELEPHONE ENCOUNTER
03/18/24 9:13 AM    Patient contacted post ED visit, first outreach attempt made. Message was left for patient to return a call to the VBI Department at [facility]: Phone [phone].    Thank you.  ADRIANA DIEHL  [facility]

## 2024-03-19 ENCOUNTER — APPOINTMENT (OUTPATIENT)
Dept: PHYSICAL THERAPY | Facility: CLINIC | Age: 64
End: 2024-03-19
Payer: MEDICARE

## 2024-03-19 ENCOUNTER — OFFICE VISIT (OUTPATIENT)
Dept: OBGYN CLINIC | Facility: CLINIC | Age: 64
End: 2024-03-19
Payer: MEDICARE

## 2024-03-19 ENCOUNTER — TELEPHONE (OUTPATIENT)
Dept: OBGYN CLINIC | Facility: HOSPITAL | Age: 64
End: 2024-03-19

## 2024-03-19 ENCOUNTER — APPOINTMENT (OUTPATIENT)
Dept: RADIOLOGY | Facility: CLINIC | Age: 64
End: 2024-03-19
Payer: MEDICARE

## 2024-03-19 VITALS
WEIGHT: 207 LBS | BODY MASS INDEX: 35.34 KG/M2 | DIASTOLIC BLOOD PRESSURE: 100 MMHG | HEIGHT: 64 IN | SYSTOLIC BLOOD PRESSURE: 160 MMHG

## 2024-03-19 DIAGNOSIS — Z96.652 S/P TKR (TOTAL KNEE REPLACEMENT), LEFT: ICD-10-CM

## 2024-03-19 DIAGNOSIS — R29.6 MULTIPLE FALLS: ICD-10-CM

## 2024-03-19 DIAGNOSIS — R10.32 LEFT GROIN PAIN: ICD-10-CM

## 2024-03-19 DIAGNOSIS — Z96.652 S/P TKR (TOTAL KNEE REPLACEMENT), LEFT: Primary | ICD-10-CM

## 2024-03-19 PROCEDURE — 99213 OFFICE O/P EST LOW 20 MIN: CPT | Performed by: PHYSICIAN ASSISTANT

## 2024-03-19 PROCEDURE — 73502 X-RAY EXAM HIP UNI 2-3 VIEWS: CPT

## 2024-03-19 PROCEDURE — 73562 X-RAY EXAM OF KNEE 3: CPT

## 2024-03-19 NOTE — TELEPHONE ENCOUNTER
03/19/24 8:52 AM    Patient contacted post ED visit, VBI department spoke with patient/caregiver and outreach was successful.    Thank you.  ADRIANA DIEHL  [facility]

## 2024-03-19 NOTE — TELEPHONE ENCOUNTER
"Patient fell x3  at home onto concrete, was evaluated in the ER \"either Friday or Saturday\" and they said \"everything was fine\" Patient said since, had fell again onto carpet onto bottom, but used knees to get back up. I asked patient for reasoning of falls, she said \"I'm just having a hard time getting up\". Patient states to feel safe at home with kids and niece. Pt reports pain from left foot to knee and intermittently in the left groin. I will route to surgeon on call.   "

## 2024-03-19 NOTE — TELEPHONE ENCOUNTER
Caller: Patient    Doctor: Dr. Juan Ma    Reason for call: Patient had left knee replacement on 02/05/2024. She fell 03/14-03/15 fell four times. Dr. Ma is on vacation, patient said she is experiencing pain, going up her calf to her groin area.     Call back#: [phone]

## 2024-03-19 NOTE — PROGRESS NOTES
Orthopaedic Surgery - Office Note  Ayleen Moralez (63 y.o. female)   : 1960   MRN: [id number]  Encounter Date: 3/19/2024    Chief Complaint   Patient presents with    Left Hip - Injury    Left Knee - Injury         Assessment/Plan  Diagnoses and all orders for this visit:    S/P TKR (total knee replacement), left  -     XR hip/pelv 2-3 vws left if performed; Future  -     Cancel: XR knee 4+ vw left injury; Future    Multiple falls  -     XR hip/pelv 2-3 vws left if performed; Future  -     Cancel: XR knee 4+ vw left injury; Future    Left groin pain  -     XR hip/pelv 2-3 vws left if performed; Future  -     Cancel: XR knee 4+ vw left injury; Future    The diagnosis as well as treatment options were reviewed with the patient in the office today.  We had a long discussion regarding fall prevention.  We discussed moving rugs taking her time with ambulation using the walker and asking for assistance when needed.  She describes a good social situation with lots of help.  She will restart physical therapy immediately and diligently perform her home exercise program.  Appropriate return to emergency department discussions were reviewed.  She will ice the knee for comfort 20 minutes on 1 hour off 3 times a day.  I did advise against continued use of oral narcotics as it may be affecting her ambulation.  She was switched to oral Tylenol and limit narcotics is much as possible.  All questions and concerns were answered in the office today.  She will call the office with any further questions.     Return for Recheck with Dr. Ma as scheduled-earlier if needed.  restart PT.        History of Present Illness  This is a patient of Dr. Ma who had a left total knee arthroplasty on 2024.  Patient reports having multiple falls at home and went to the emergency department on 3/15/2024 having x-rays of the left knee and ankle which were negative for fracture.  She began calling the office yesterday regarding her for  "falls.  She canceled PT yesterday.  Patient began calling again today reporting she had a new fall where she landed on her buttocks but used her knees to get back up.  She is having pain in her groin and knee area.  She is described difficulty getting up from a seated position but does feel safe at home with her kids and niece.  Patient has been using her rolling walker.  Patient reports the pain in the anterior aspect of the knee is mild.  She reports the left groin pain is mild as well it does not change with weightbearing.    Review of Systems  Pertinent items are noted in HPI.  All other systems were reviewed and are negative.    Physical Exam  /100 (BP Location: Right arm, Patient Position: Sitting, Cuff Size: Adult)   Ht 5' 4\" (1.626 m)   Wt 93.9 kg (207 lb)   LMP  (LMP Unknown)   BMI 35.53 kg/m²   Cons: Appears well.  No apparent distress.  Psych: Alert. Oriented x3.  Mood and affect normal.  Patient's left hip has a painless range of motion both actively and passively with no pain to forced internal rotation.  She has a negative jar sign for fracture.  Her left knee incision is healing well there is a tiny abrasion from her recent fall which was wiped with alcohol and cleaned.  There is no dehiscence of the wound.  She is ligamentously stable to valgus and varus stressing.  She has flexion to about 70 degrees extension to -10 degrees.  There is no calf tenderness and a negative Homans.  There is no evidence of DVT or infection.  She is able to straight leg raise today in the office without any evidence of disruption of the extensor mechanism.  She is neurovascular intact in the left lower extremity with a negative straight leg raise.  She ambulates safely in the office heel-to-toe with the assistance of a rolling walker.    Independent review of x-rays performed in the office today:  Left knee shows no acute fractures or dislocations.  Postsurgical changes status post total knee arthroplasty are " noted with no changes compared to films at the emergency department.    3 views of the left hip show no acute fractures or dislocations.  Mild degenerative changes are seen.  X-rays were reviewed from orthopedic standpoint will await official radiologist interpretation.        Studies Reviewed  R KNEE 4+ VW LEFT INJURY     INDICATION: fall.     COMPARISON: 2/15/2024     FINDINGS:     Total arthroplasty stable hardware and bony alignment without hardware complication.     No acute fracture or dislocation.     No joint effusion.     No significant degenerative changes.     No lytic or blastic osseous lesion.     Unremarkable soft tissues.     IMPRESSION:     No acute osseous abnormality.           Workstation performed: [id number]    Narrative & Impression  XR ANKLE 3+ VW LEFT     INDICATION: fall.     COMPARISON: None     FINDINGS:     No acute fracture or dislocation.     Calcaneal small spur. First metatarsal prior osteotomy with screws.     No lytic or blastic osseous lesion.     Unremarkable soft tissues.     IMPRESSION:     No acute osseous abnormality.           Workstation performed: [id number]  Independent review of x-rays performed on 3/15/2024 by myself today in the office is in agreement with radiologist interpretation.  Emergency department notes were reviewed by myself in the office today.  Operative report was reviewed by myself in the office today.  Extensive phone messages were reviewed by myself in the office today.    Procedures  No procedures today.    Medical, Surgical, Family, and Social History  The patient's medical history, family history, and social history, were reviewed and updated as appropriate.    Past Medical History:   Diagnosis Date    Anxiety     Anxiety disorder     Arthritis     At risk for falls     Bipolar 2 disorder (HCC)     Chronic back pain     Chronic kidney disease     Closed fracture of distal end of right fibula with routine healing 11/04/2020    COVID-19     in [place]      CVA (cerebral vascular accident) (HCC)     noted on MRI in the past    Depression     GERD (gastroesophageal reflux disease)     Hypercholesteremia     Hypernatremia     Hypertension     Hypokalemia     Idiopathic chronic pancreatitis (HCC) 2018    Intervertebral disc disorder with radiculopathy of lumbosacral region     resolved: 2015    Kidney disease     Kidney disease     Limb alert care status     LUE-fistula    Panic attacks     Pericardial effusion     PONV (postoperative nausea and vomiting)     Psychiatric problem     Radiculitis     resolved: 2015    Secondary renal hyperparathyroidism (HCC)     Stroke (HCC)     Vitamin D deficiency        Past Surgical History:   Procedure Laterality Date    BUNIONECTOMY      Left foot     CAST APPLICATION Right 2022    Procedure: Application short-arm thumb spica splint;  Surgeon: Lyndon Victoria MD;  Location: [facility];  Service: Orthopedics    COLON SURGERY      COLONOSCOPY  2021    DILATION AND CURETTAGE OF UTERUS      INDUCED       surgically induced    CA ARTERIOVENOUS ANASTOMOSIS OPEN DIRECT Left 2019    Procedure: CREATION FISTULA ARTERIOVENOUS (AV) left wrists possible left upper;  Surgeon: Andrey Quintero MD;  Location: [facility];  Service: Vascular    CA ARTHRP KNE CONDYLE&PLATU MEDIAL&LAT COMPARTMENTS Left 2024    Procedure: ARTHROPLASTY KNEE TOTAL SAME DAY;  Surgeon: Riki Ma MD;  Location: [facility];  Service: Orthopedics    CA CORRJ HLX VLGS BNCTY SESMDC DSTL METAR OSTEOT Left 2019    Procedure: Serge bunionectomy;  Surgeon: Munir Larkin DPM;  [facility];  Service: Podiatry    CA CORRJ HLX VLGS BNCTY SESMDC DSTL METAR OSTEOT Right 8/3/2020    Procedure: BUNIONECTOMY RAFAEL;  Surgeon: Munir Larkin DPM;  Location: [facility];  Service: Podiatry    CA CORRJ HLX VLGS BNCTY SESMDC PROX PHLX OSTEOT Right 2021    Procedure: BUNIONECTOMY TAMEKA, right tameka osteotomy and 2nd claw  toe correction;  Surgeon: James R Lachman, MD;  Location: [facility];  Service: Orthopedics    DE ERCP DX COLLECTION SPECIMEN BRUSHING/WASHING N/A 4/11/2018    Procedure: ENDOSCOPIC RETROGRADE CHOLANGIOPANCREATOGRAPHY (ERCP);  Surgeon: Alfredo Messina MD;  [facility];  Service: Gastroenterology    DE LAPAROSCOPY PROCTOPEXY PROLAPSE N/A 7/13/2018    Procedure: ROBOTIC SIGMOID RESECTION / RECTOPEXY;  Surgeon: ELPIDIO Mcnulty MD;  [facility];  Service: Colorectal    DE OPEN TREATMENT RADIAL SHAFT FRACTURE Right 10/11/2021    Procedure: OPEN REDUCTION W/ INTERNAL FIXATION (ORIF) RADIUS (WRIST), RIGHT DISTAL;  Surgeon: Riki Ma MD;  Location: [facility];  Service: Orthopedics    DE REMOVAL IMPLANT DEEP Right 6/23/2022    Procedure: Removal of hardware volar aspect right distal radius (distal radial plate and screws);  Surgeon: Lyndon Victoria MD;  Location: [facility];  Service: Orthopedics    DE SIGMOIDOSCOPY FLX DX W/COLLJ SPEC BR/WA IF PFRMD N/A 7/13/2018    Procedure: SIGMOIDOSCOPY FLEXIBLE;  Surgeon: ELPIDIO Mcnulty MD;  [facility];  Service: Colorectal    DE TR TDN RESTORE INTRNSC FUNCJ ALL 4 FNGRS Right 6/23/2022    Procedure: Right ring finger flexor digitorum superficialis to flexor pollicis longus tendon transfer;  Surgeon: Lyndon Victoria MD;  Location: [facility];  Service: Orthopedics    TUBAL LIGATION Bilateral 1997    US GUIDED THYROID BIOPSY  7/30/2019       Family History   Problem Relation Age of Onset    Bipolar disorder Mother     Mental illness Mother         depression    Stroke Mother     Dementia Mother     Colon polyps Mother     Heart disease Father     Hypertension Father     Diabetes Father     Other Family         Back disorder    Diabetes Family     Heart disease Family     Hypertension Family     Stroke Family     Thyroid disease Family     Breast cancer Paternal Grandmother         age unknown    Breast cancer Paternal Aunt         age unknown     Breast cancer Maternal Aunt         age unknown    Mental illness Sister     Colon polyps Sister     Mental illness Sister     Heart disease Sister     No Known Problems Sister     Breast cancer Sister 68    Other Son         pituitary tumor    Hypertension Son     Obesity Son     No Known Problems Son     No Known Problems Maternal Grandmother     No Known Problems Maternal Grandfather     No Known Problems Paternal Grandfather     Breast cancer Paternal Aunt         age unknown    Substance Abuse Neg Hx         neg fam hx    Colon cancer Neg Hx        Social History     Occupational History    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   Tobacco Use    Smoking status: Never    Smokeless tobacco: Never   Vaping Use    Vaping status: Never Used   Substance and Sexual Activity    Alcohol use: Not Currently    Drug use: Not Currently     Types: Hydrocodone, Marijuana     Comment: MEDICATION   Ayleen has h/o marijuana abuse- not currently    Sexual activity: Not Currently       Allergies   Allergen Reactions    Molds & Smuts Nasal Congestion    Bee Pollen Nasal Congestion     Other reaction(s): Nasal Congestion    Pollen Extract Nasal Congestion         Current Outpatient Medications:     acetaminophen (TYLENOL) 500 mg tablet, Take 2 tablets (1,000 mg total) by mouth every 8 (eight) hours, Disp: 60 tablet, Rfl: 0    albuterol (Ventolin HFA) 90 mcg/act inhaler, Inhale 2 puffs every 6 (six) hours as needed for wheezing or shortness of breath, Disp: 8.5 g, Rfl: 3    alendronate (Fosamax) 70 mg tablet, Take 1 tablet (70 mg total) by mouth every 7 days, Disp: 4 tablet, Rfl: 5    AMILoride 5 mg tablet, Take 1 tablet (5 mg total) by mouth 2 (two) times a day (Patient taking differently: Take 5 mg by mouth 2 (two) times a day), Disp: 180 tablet, Rfl: 3    amLODIPine (NORVASC) 5 mg tablet, Take 1 tablet (5 mg total) by mouth daily, Disp: 90 tablet, Rfl: 3    ascorbic acid (VITAMIN C) 500 MG tablet, Take 1 tablet (500 mg total) by mouth 2  (two) times a day, Disp: 60 tablet, Rfl: 2    aspirin 81 mg chewable tablet, Chew 1 tablet (81 mg total) 2 (two) times a day, Disp: 60 tablet, Rfl: 0    budesonide-formoterol (Symbicort) 160-4.5 mcg/act inhaler, Inhale 2 puffs 2 (two) times a day Rinse mouth after use., Disp: 10.2 g, Rfl: 11    carvedilol (COREG) 25 mg tablet, Take 1 tablet (25 mg total) by mouth 2 (two) times a day with meals, Disp: 180 tablet, Rfl: 3    cholecalciferol (VITAMIN D3) 1,000 units tablet, Take 5 tablets (5,000 Units total) by mouth daily, Disp: 90 tablet, Rfl: 5    clonazePAM (KlonoPIN) 0.5 mg tablet, Take 1 tablet (0.5 mg total) by mouth 3 (three) times a day, Disp: 270 tablet, Rfl: 0    Cyanocobalamin (VITAMIN B 12 PO), Take 1,000 mcg by mouth in the morning, Disp: , Rfl:     ferrous sulfate 324 (65 Fe) mg, Take 1 tablet (324 mg total) by mouth 2 (two) times a day before meals, Disp: 60 tablet, Rfl: 2    fluvoxaMINE (LUVOX) 100 mg tablet, Fluvoxamine 100m tablet in the morning ; 2 tablets at night (Patient taking differently: Fluvoxamine 300 mg  in the morning ;), Disp: 270 tablet, Rfl: 1    folic acid (FOLVITE) 1 mg tablet, Take 1 tablet (1 mg total) by mouth daily, Disp: 30 tablet, Rfl: 2    haloperidol (HALDOL) 10 mg tablet, Haldoperidol 10m tablet in the morning and 1 tablet at night, Disp: 60 tablet, Rfl: 2    haloperidol (HALDOL) 2 mg tablet, Haloperidol 2m tablet po daily in the afternoon, 1 tablet PRN, Disp: 60 tablet, Rfl: 2    lamoTRIgine (LaMICtal) 200 MG tablet, Lamotrigine 200m tablet in the morning and 1 tablet at night, Disp: 180 tablet, Rfl: 0    Multiple Vitamin (MULTI-VITAMIN) tablet, Take 1 tablet by mouth daily, Disp: 30 tablet, Rfl: 2    omeprazole (PriLOSEC) 40 MG capsule, Take 1 capsule (40 mg total) by mouth daily before breakfast, Disp: 90 capsule, Rfl: 1    ondansetron (ZOFRAN) 4 mg tablet, Take 1 tablet (4 mg total) by mouth every 8 (eight) hours as needed for nausea or vomiting, Disp: 30  tablet, Rfl: 0    oxyCODONE (Roxicodone) 5 immediate release tablet, Take 1 tablet (5 mg total) by mouth every 4 (four) hours as needed for severe pain Continue current therapy. Max Daily Amount: 30 mg, Disp: 20 tablet, Rfl: 0    QUEtiapine (SEROquel) 400 MG tablet, Quetiapine Fumarate 400m tablet at night, Disp: 90 tablet, Rfl: 0    rosuvastatin (CRESTOR) 20 MG tablet, Take 1 tablet (20 mg total) by mouth every evening, Disp: 90 tablet, Rfl: 3    traMADol (Ultram) 50 mg tablet, Take 1 tablet (50 mg total) by mouth every 12 (twelve) hours as needed for moderate pain, Disp: 60 tablet, Rfl: 0      Otto Marquez PA-C

## 2024-03-19 NOTE — PATIENT INSTRUCTIONS
Fall Prevention for Older Adults   WHAT YOU NEED TO KNOW:   As you age, your muscles weaken and your risk for falls increases. Your risk also increases if you take medicines that make you sleepy or dizzy. You may also be at risk if you have vision or joint problems, have low blood pressure, or are not active.  DISCHARGE INSTRUCTIONS:   Arrange to have someone call your local emergency number (911 in the [place]) if:   You have fallen and are found unconscious.    You have fallen and cannot move part of your body.    Call your doctor if:   You have fallen and have pain or a headache.    You have questions or concerns about your condition or care.    Fall prevention tips:   Stay active.  Exercise can help strengthen your muscles and improve your balance. Your healthcare provider may recommend water aerobics, walking, or Romeo Chi. He or she may also recommend physical therapy to improve your coordination. Never start an exercise program without asking your healthcare provider first.            Wear shoes that fit well and have soles that .  Wear shoes both inside and outside. Use slippers with good . Avoid shoes with high heels.    Use assistive devices as directed.  Your healthcare provider may suggest that you use a cane or walker to help you keep your balance. You may need to have grab bars put in your bathroom near the toilet or in the shower.    Stand or sit up slowly.  This may help you keep your balance and prevent falls.    Manage your medical conditions.  Keep all appointments with your healthcare providers. Visit your eye doctor as directed.    Home safety tips:       Add items to prevent falls in the bathroom.  Put nonslip strips on your bath or shower floor to prevent you from slipping. Use a bath mat if you do not have carpet in the bathroom. This will prevent you from falling when you step out of the bath or shower. Use a shower seat so you do not need to stand while you shower. Sit on the toilet or a  chair in your bathroom to dry yourself and put on clothing. This will prevent you from losing your balance from drying or dressing yourself while you are standing.    Keep paths clear.  Remove books, shoes, and other objects from walkways and stairs. Place cords for telephones and lamps out of the way so that you do not need to walk over them. Tape them down if you cannot move them. Remove small rugs. If you cannot remove a rug, secure it with double-sided tape. This will prevent you from tripping.    Install bright lights in your home.  Use night lights to help light paths to the bathroom or kitchen. Always turn on the light before you start walking.    Keep items you use often on shelves within reach.  Do not use a step stool to help you reach an item.    Paint or place reflective tape on the edges of your stairs.  This will help you see the stairs better.  Plan ahead in case you do fall:  Talk with family members, friends, and neighbors to create a fall plan. Someone will need to call for emergency help if you are injured or found unconscious. If possible, keep a mobile phone with you at all times, or wear an emergency alert device. You can contact emergency services by pressing a button on the device. Ask your healthcare provider for more information.  Follow up with your doctor as directed:  Write down your questions so you remember to ask them during your visits.  © Copyright [employer] 2023 Information is for End User's use only and may not be sold, redistributed or otherwise used for commercial purposes.  The above information is an  only. It is not intended as medical advice for individual conditions or treatments. Talk to your doctor, nurse or pharmacist before following any medical regimen to see if it is safe and effective for you.

## 2024-03-20 ENCOUNTER — TELEPHONE (OUTPATIENT)
Age: 64
End: 2024-03-20

## 2024-03-20 NOTE — TELEPHONE ENCOUNTER
Pt canceled appt/ only wants appt in [place]/ did not find any appts that worked for her/ her referral expires July 2024/ she will call her PCP to get another referral to set up future appt and call us back. I asked if she would be willing to travel to another location / and she wants only [place] office.

## 2024-03-21 ENCOUNTER — APPOINTMENT (OUTPATIENT)
Dept: PHYSICAL THERAPY | Facility: CLINIC | Age: 64
End: 2024-03-21
Payer: MEDICARE

## 2024-03-22 ENCOUNTER — TELEPHONE (OUTPATIENT)
Dept: FAMILY MEDICINE CLINIC | Facility: HOSPITAL | Age: 64
End: 2024-03-22

## 2024-03-22 NOTE — TELEPHONE ENCOUNTER
LDMOM FOR PT TO CALL BACK AND JUST SPECIFY --IS IT A RHEUM REF??   THERE IS SOMETHING IN THE MESSAGE ABOUT [facility] AND [facility]--WHOEVER THAT IS.  WILL AWAIT A CALL BACK.

## 2024-03-22 NOTE — TELEPHONE ENCOUNTER
Patient looking for referral to go to [facility].          Hi, this is Ayleen. My birth date is [birth year]. I'm calling because I need like the referral. That doctor flag is just to go to another doctor. [facility] with [facility]. My phone number is [phone]. Thank you.  You received a voice mail from [employer].

## 2024-03-26 ENCOUNTER — OFFICE VISIT (OUTPATIENT)
Dept: PHYSICAL THERAPY | Facility: CLINIC | Age: 64
End: 2024-03-26
Payer: MEDICARE

## 2024-03-26 DIAGNOSIS — M17.12 PRIMARY OSTEOARTHRITIS OF LEFT KNEE: Primary | ICD-10-CM

## 2024-03-26 PROCEDURE — 97110 THERAPEUTIC EXERCISES: CPT | Performed by: PHYSICAL THERAPIST

## 2024-03-26 PROCEDURE — 97112 NEUROMUSCULAR REEDUCATION: CPT | Performed by: PHYSICAL THERAPIST

## 2024-03-26 NOTE — PROGRESS NOTES
"Progress Note    Today's date: 3/26/2024  Patient name: Ayleen Moralez  : 1960  MRN: [id number]  Referring provider: Yari Wallace PA-C  Dx:   Encounter Diagnosis     ICD-10-CM    1. Primary osteoarthritis of left knee  M17.12         Subjective: Compliant with HEP, no questions regarding POC, motivated to continue PT      Objective: See treatment diary below    Assessment: Tolerated treatment well with progression of treatment program as noted below requiring verbal and tactile cues from PT for safe execution of therapeutic exercise. Patient demonstrated fatigue post treatment, exhibited good technique with therapeutic exercises, however pt's full, pain-free L knee mobility & stability remain limited at this time, secondary to TKA, contributing to functional deficits, and would benefit from continued PT      Plan: Continue per plan of care.  Progress treatment as tolerated.       L TKA 2024    EPOC: 2024      Manuals 3/26          L Knee PROM NS          Gentle L tibiofemoral mobs                                 Neuro Re-Ed           L quad set 5\" x 25          Heel slide (knee flexion f/b hip abd/add) x25          SLR iso 2x10           SAQ iso X30 5\"          LAQ iso X25 5\"          Reverse squat slant                      Ther Ex           HR 25          TR 25          Slant board 10\"x10 was having some pain          Clam shell supine                                            L Knee TERT with heat Into EXT 8'          Ther Activity           LE Bike for improved L knee ROM           Pt education: pathoanatomy, nature of sxs, POC, HEP           Gait Training           SPC                       Modalities           ICE Prn           Heat Prn             "

## 2024-03-28 ENCOUNTER — OFFICE VISIT (OUTPATIENT)
Dept: PHYSICAL THERAPY | Facility: CLINIC | Age: 64
End: 2024-03-28
Payer: MEDICARE

## 2024-03-28 DIAGNOSIS — M17.12 PRIMARY OSTEOARTHRITIS OF LEFT KNEE: Primary | ICD-10-CM

## 2024-03-28 DIAGNOSIS — M81.0 AGE-RELATED OSTEOPOROSIS WITHOUT CURRENT PATHOLOGICAL FRACTURE: ICD-10-CM

## 2024-03-28 DIAGNOSIS — Z78.0 POSTMENOPAUSAL: Primary | ICD-10-CM

## 2024-03-28 PROCEDURE — 97112 NEUROMUSCULAR REEDUCATION: CPT

## 2024-03-28 PROCEDURE — 97140 MANUAL THERAPY 1/> REGIONS: CPT

## 2024-03-28 PROCEDURE — 97110 THERAPEUTIC EXERCISES: CPT

## 2024-03-28 NOTE — TELEPHONE ENCOUNTER
Spoke with patient. Confirmed that she wanted a referral to rheumatologist Dr Abi Bravo in [place]. Referral faxed to their office. Patient aware.

## 2024-03-28 NOTE — PROGRESS NOTES
"Daily Note     Today's date: 3/28/2024  Patient name: Ayleen Moralez  : 1960  MRN: [id number]  Referring provider: Yari Wallace PA-C  Dx:   Encounter Diagnosis     ICD-10-CM    1. Primary osteoarthritis of left knee  M17.12                      Subjective: Pt reports falling again just yesterday while using steps.  Reports landing on her L knee.  Pt states she used ice post fall.  C/o pain inf patella.      Objective: See treatment diary below      Assessment: Tolerated treatment fair. Pt's pain was monitored t/o session.  Pt was able to complete full revolution on the bike today.  Patient demonstrated fatigue post treatment and would benefit from continued PT for knee ROM and LE strengthening.  Pt was encouraged to be more aware of her surroundings and take more care using the walker.      Plan: Continue per plan of care.  Progress treatment as tolerated.       L TKA 2024    EPOC: 2024      Manuals 3/26 3/28         L Knee PROM NS JK 10'         Gentle L tibiofemoral mobs                                 Neuro Re-Ed           L quad set 5\" x 25 5\" x 25         Heel slide (knee flexion f/b hip abd/add) x25 x 25         SLR iso 2x10  2x10         SLR hip abd SL  2x10         SAQ iso X30 5\" nv         LAQ iso X25 5\" X25 5\"         Reverse squat slant           Mini squat  15x5\"         Side stepping  3 laps                               Ther Ex           HR 25 25         TR 25 25         Slant board 10\"x10 was having some pain 1'         Clam shell supine  nv                                          L Knee TERT with heat Into EXT 8'          Ther Activity           LE Bike for improved L knee ROM  5' full revol.         Pt education: pathoanatomy, nature of sxs, POC, HEP           Gait Training           SPC                       Modalities           ICE Prn           Heat Prn                  "

## 2024-03-29 DIAGNOSIS — M79.605 BILATERAL LEG PAIN: ICD-10-CM

## 2024-03-29 DIAGNOSIS — M79.604 BILATERAL LEG PAIN: ICD-10-CM

## 2024-03-29 DIAGNOSIS — M54.50 LUMBAR PAIN: ICD-10-CM

## 2024-03-29 RX ORDER — TRAMADOL HYDROCHLORIDE 50 MG/1
50 TABLET ORAL EVERY 12 HOURS PRN
Qty: 60 TABLET | Refills: 0 | Status: SHIPPED | OUTPATIENT
Start: 2024-03-29

## 2024-03-31 DIAGNOSIS — F31.2 BIPOLAR I DISORDER, CURRENT OR MOST RECENT EPISODE MANIC, SEVERE WITH MOOD-INCONGRUENT PSYCHOTIC FEATURES (HCC): ICD-10-CM

## 2024-03-31 DIAGNOSIS — F31.13 BIPOLAR I DISORDER, CURRENT OR MOST RECENT EPISODE MANIC, SEVERE WITH MIXED FEATURES (HCC): ICD-10-CM

## 2024-04-01 ENCOUNTER — OFFICE VISIT (OUTPATIENT)
Dept: PHYSICAL THERAPY | Facility: CLINIC | Age: 64
End: 2024-04-01
Payer: MEDICARE

## 2024-04-01 DIAGNOSIS — M17.12 PRIMARY OSTEOARTHRITIS OF LEFT KNEE: Primary | ICD-10-CM

## 2024-04-01 PROCEDURE — 97112 NEUROMUSCULAR REEDUCATION: CPT

## 2024-04-01 PROCEDURE — 97110 THERAPEUTIC EXERCISES: CPT

## 2024-04-01 PROCEDURE — 97140 MANUAL THERAPY 1/> REGIONS: CPT

## 2024-04-01 RX ORDER — LAMOTRIGINE 200 MG/1
TABLET ORAL
Qty: 180 TABLET | Refills: 3 | OUTPATIENT
Start: 2024-04-01

## 2024-04-01 RX ORDER — QUETIAPINE FUMARATE 400 MG/1
TABLET, FILM COATED ORAL
Qty: 30 TABLET | Refills: 1 | Status: SHIPPED | OUTPATIENT
Start: 2024-04-01

## 2024-04-01 RX ORDER — LAMOTRIGINE 200 MG/1
TABLET ORAL
Qty: 60 TABLET | Refills: 1 | Status: SHIPPED | OUTPATIENT
Start: 2024-04-01

## 2024-04-01 RX ORDER — QUETIAPINE FUMARATE 400 MG/1
TABLET, FILM COATED ORAL
Qty: 90 TABLET | Refills: 3 | OUTPATIENT
Start: 2024-04-01

## 2024-04-01 NOTE — PROGRESS NOTES
"Daily Note     Today's date: 2024  Patient name: Ayleen Moralez  : 1960  MRN: [id number]  Referring provider: Yari Wallace PA-C  Dx:   Encounter Diagnosis     ICD-10-CM    1. Primary osteoarthritis of left knee  M17.12                      Subjective: Pt presents w/o an A device stating she forgot it at home.  Pt reports she feels she can move to a SPC.  Pt reports feeling good post LV.      Objective: See treatment diary below      Assessment: Tolerated treatment well. Patient demonstrated fatigue post treatment and would benefit from continued PT for cont'd LE strengthening.  Pt ambulates w/ BL flexed knees often times shuffling her feet w/ gait.  Pt encouraged to con't use of walker for safety.  Pt making good progress w/ knee ROM.      Plan: Continue per plan of care.  Progress treatment as tolerated.       L TKA 2024    EPOC: 2024      Manuals 3/26 3/28 4/1        L Knee PROM NS JK 10' Jk 10'        Gentle L tibiofemoral mobs                                 Neuro Re-Ed           L quad set 5\" x 25 5\" x 25 5\" x 25        Heel slide (knee flexion f/b hip abd/add) x25 x 25 x25        SLR iso 2x10  2x10 2x10        SLR hip abd SL  2x10         SAQ iso X30 5\" nv nv        LAQ iso X25 5\" X25 5\" X25 5\"        Reverse squat slant           Mini squat  15x5\" 15x5\"        Side stepping  3 laps 3 laps                              Ther Ex           HR 25 25 Slt bd 20        TR 25 25 Slt bd 20        Slant board 10\"x10 was having some pain 1' 1'        Clam shell supine  nv nv                                         L Knee TERT with heat Into EXT 8'          Ther Activity           LE Bike for improved L knee ROM  5' full revol. 6' full revol.        Pt education: pathoanatomy, nature of sxs, POC, HEP           Gait Training           SPC    60' x2                   Modalities           ICE Prn           Heat Prn                    "

## 2024-04-01 NOTE — TELEPHONE ENCOUNTER
Pt Ayleen Moralez , 1960 , SLPF chart was reviewed.  Seroquel 400mg  qty 30 1 rfand Lamotrigine 200mg qty 60 1 rf was prescribed and sent to [facility], Ayleen has pending SLPF appointment on May 1, 2024.      Medications Prescribed During SLPF Encounter:     -     lamoTRIgine (LaMICtal) 200 MG tablet; Lamotrigine 200m tablet in the morning and 1 tablet at night  -     QUEtiapine (SEROquel) 400 MG tablet; Quetiapine Fumarate 400m tablet at night          This note was not shared with the patient due to reasonable likelihood of causing patient harm     This note may have been written with the assistance of dictation software. Please excuse any grammatical  errors, misspellings,  and abnormal spacing of letters , sentences or paragraphs . For accurate interpretation should read note horizontally

## 2024-04-02 ENCOUNTER — TELEPHONE (OUTPATIENT)
Dept: GASTROENTEROLOGY | Facility: CLINIC | Age: 64
End: 2024-04-02

## 2024-04-02 ENCOUNTER — SOCIAL WORK (OUTPATIENT)
Dept: BEHAVIORAL/MENTAL HEALTH CLINIC | Facility: CLINIC | Age: 64
End: 2024-04-02
Payer: MEDICARE

## 2024-04-02 ENCOUNTER — OFFICE VISIT (OUTPATIENT)
Dept: GASTROENTEROLOGY | Facility: CLINIC | Age: 64
End: 2024-04-02
Payer: MEDICARE

## 2024-04-02 VITALS
DIASTOLIC BLOOD PRESSURE: 80 MMHG | BODY MASS INDEX: 35.68 KG/M2 | WEIGHT: 209 LBS | HEIGHT: 64 IN | SYSTOLIC BLOOD PRESSURE: 128 MMHG

## 2024-04-02 DIAGNOSIS — Z86.010 HISTORY OF COLON POLYPS: ICD-10-CM

## 2024-04-02 DIAGNOSIS — F41.1 GENERALIZED ANXIETY DISORDER: ICD-10-CM

## 2024-04-02 DIAGNOSIS — K59.00 CONSTIPATION, UNSPECIFIED CONSTIPATION TYPE: ICD-10-CM

## 2024-04-02 DIAGNOSIS — K21.9 GASTROESOPHAGEAL REFLUX DISEASE WITHOUT ESOPHAGITIS: Primary | ICD-10-CM

## 2024-04-02 DIAGNOSIS — F31.2 BIPOLAR I DISORDER, CURRENT OR MOST RECENT EPISODE MANIC, SEVERE WITH MOOD-INCONGRUENT PSYCHOTIC FEATURES (HCC): Primary | ICD-10-CM

## 2024-04-02 DIAGNOSIS — R13.19 ESOPHAGEAL DYSPHAGIA: ICD-10-CM

## 2024-04-02 PROCEDURE — 99214 OFFICE O/P EST MOD 30 MIN: CPT | Performed by: INTERNAL MEDICINE

## 2024-04-02 PROCEDURE — 90834 PSYTX W PT 45 MINUTES: CPT | Performed by: COUNSELOR

## 2024-04-02 NOTE — PSYCH
"Behavioral Health Psychotherapy Progress Note    Psychotherapy Provided: Individual Psychotherapy     1. Bipolar I disorder, current or most recent episode manic, severe with mood-incongruent psychotic features (HCC)        2. Generalized anxiety disorder            Goals addressed in session: Goal 1     DATA: Updated goals today with client.  Ayleen said she had a left knee replacement on February 5th.  She said it's still painful and said she has fallen \"5 times since then.\"  She said she has talked to her doctor about this.  Ayleen talked about her living situation - living with sister and niece.  She said her sister raised her rent and she said \"I don't have much at all left over to pay bills.\"  She talked about regrets she has that she didn't move when she had a voucher some time ago.  She said \"now I have to wait 2 years before getting on lists again.\"  We dicussed good self-care and talking to her sister about her financial concerns.   During this session, this clinician used the following therapeutic modalities: Solution-Focused Therapy    Substance Abuse was not addressed during this session. If the client is diagnosed with a co-occurring substance use disorder, please indicate any changes in the frequency or amount of use: N/A. Stage of change for addressing substance use diagnoses: No substance use/Not applicable    ASSESSMENT:  Ayleen Moralez presents with a Depressed mood.     her affect is Normal range and intensity, which is congruent, with her mood and the content of the session. The client has made progress on their goals.    Ayleen Moralez presents with a none risk of suicide, none risk of self-harm, and none risk of harm to others.    For any risk assessment that surpasses a \"low\" rating, a safety plan must be developed.    A safety plan was indicated: no  If yes, describe in detail N/A    PLAN: Between sessions, Ayleen Moralez will talk to her sister about her finances and practice " good self care. At the next session, the therapist will use Solution-Focused Therapy to address goals/needs/concerns.    Behavioral Health Treatment Plan and Discharge Planning: Ayleen Moralez is aware of and agrees to continue to work on their treatment plan. They have identified and are working toward their discharge goals. yes    Visit start and stop times:    04/02/24  Start Time: 1000  Stop Time: 1049  Total Visit Time: 49 minutes

## 2024-04-02 NOTE — BH TREATMENT PLAN
"Outpatient Behavioral Health Psychotherapy Treatment Plan    Ayleen Moralez  1960     Date of Initial Psychotherapy Assessment: Many years ago   Date of Current Treatment Plan: 04/02/24  Treatment Plan Target Date: TBD  Treatment Plan Expiration Date: 10-2-24    Diagnosis:   1. Bipolar I disorder, current or most recent episode manic, severe with mood-incongruent psychotic features (HCC)        2. Generalized anxiety disorder            Area(s) of Need: Bipolar, Depression, OCD and Anxiety    Long Term Goal 1 (in the client's own words): Not feel so depressed anymore    Stage of Change: Action    Target Date for completion: TBD     Anticipated therapeutic modalities: Mindfulness, Motivational Interviewing     People identified to complete this goal: Client       Objective 1: (identify the means of measuring success in meeting the objective): I will feel happier.      Objective 2: (identify the means of measuring success in meeting the objective): I will be more motivated to do things.      Long Term Goal 2 (in the client's own words): Better control over my anxiety.  Client said she is making a little progress in this area.    Stage of Change: Action    Target Date for completion: TBD     Anticipated therapeutic modalities: Mindful, CBT     People identified to complete this goal: Client      Objective 1: (identify the means of measuring success in meeting the objective): I will be more motivated to do things as opposed to being \"in a shell\" (more withdrawn)      Objective 2: (identify the means of measuring success in meeting the objective): N/A     Long Term Goal 3 (in the client's own words): I want my privacy back to help with my mood - client has a desire to move out of her sister's house and have her own place.     Stage of Change: Preparation     Target Date for completion: TBD             Anticipated therapeutic modalities: Problem solving             People identified to complete this goal: " Client                    Objective 1: (identify the means of measuring success in meeting the objective): I will be able to go back upstairs to my own bed and my own room.                    Objective 2: (identify the means of measuring success in meeting the objective): N/A    I am currently under the care of a [facility] psychiatric provider: yes    My [facility] psychiatric provider is: Dr. Medina    I am currently taking psychiatric medications: Yes, as prescribed    I feel that I will be ready for discharge from mental health care when I reach the following (measurable goal/objective): When goals are reached.    For children and adults who have a legal guardian:   Has there been any change to custody orders and/or guardianship status? NA. If yes, attach updated documentation.    I have created my Crisis Plan and have been offered a copy of this plan    Behavioral Health Treatment Plan [facility]: Diagnosis and Treatment Plan explained to Ayleen Moralez acknowledges an understanding of their diagnosis. Ayleen Moralez agrees to this treatment plan.    I have been offered a copy of this Treatment Plan. yes

## 2024-04-02 NOTE — PROGRESS NOTES
[facility] - Outpatient Follow-up Note  Ayleen Moralez 63 y.o. female MRN: [id number]  Encounter: [id number]    ASSESSMENT AND PLAN:      1. Gastroesophageal reflux disease without esophagitis  Chronic history of this controlled with omeprazole 40 mg daily    2. History of colon polyps  Last colonoscopy was March 2021.  She did have adenomatous polyps will probably need a recall in the next 1 to 2 years.    3. Constipation, unspecified constipation type  Doing well.    4. Esophageal dysphagia  Worsening.  Sounds like a stricture or ring I have scheduled her for EGD with probable dilatation  - EGD; Future      Followup Appointment: 6 months  ______________________________________________________________________    Chief Complaint   Patient presents with    Yearly follow up     HPI: Patient is a very pleasant 63-year-old female we follow with a history of acid reflux and constipation.  Also colon polyps doing reasonably well from a lower GI standpoint bowels are moving regularly no significant bleeding no melena no abdominal pain.  She is trying to lose weight and has lost about 12 pounds over the last 3 months.  She does have occasional breakthrough heartburn and also experiences dysphagia for solid foods she says once or twice a week she has something that stuck she has to either wash it down her weight for it to pass.  Has not had to seek emergency treatment.    Historical Information   Past Medical History:   Diagnosis Date    Anxiety     Anxiety disorder     Arthritis     At risk for falls     Bipolar 2 disorder (HCC)     Chronic back pain     Chronic kidney disease     Closed fracture of distal end of right fibula with routine healing 11/04/2020    COVID-19     in Jan 2021    CVA (cerebral vascular accident) (HCC)     noted on MRI in the past    Depression     GERD (gastroesophageal reflux disease)     Hypercholesteremia     Hypernatremia     Hypertension     Hypokalemia      Idiopathic chronic pancreatitis (HCC) 2018    Intervertebral disc disorder with radiculopathy of lumbosacral region     resolved: 2015    Kidney disease     Kidney disease     Limb alert care status     LUE-fistula    Panic attacks     Pericardial effusion     PONV (postoperative nausea and vomiting)     Psychiatric problem     Radiculitis     resolved: 2015    Secondary renal hyperparathyroidism (HCC)     Stroke (HCC)     Vitamin D deficiency      Past Surgical History:   Procedure Laterality Date    BUNIONECTOMY      Left foot     CAST APPLICATION Right 2022    Procedure: Application short-arm thumb spica splint;  Surgeon: Lyndon Victoria MD;  Location: [facility];  Service: Orthopedics    COLON SURGERY      COLONOSCOPY  2021    DILATION AND CURETTAGE OF UTERUS      INDUCED       surgically induced    RI ARTERIOVENOUS ANASTOMOSIS OPEN DIRECT Left 2019    Procedure: CREATION FISTULA ARTERIOVENOUS (AV) left wrists possible left upper;  Surgeon: Andrey Quintero MD;  Location: [facility];  Service: Vascular    RI ARTHRP KNE CONDYLE&PLATU MEDIAL&LAT COMPARTMENTS Left 2024    Procedure: ARTHROPLASTY KNEE TOTAL SAME DAY;  Surgeon: Riki Ma MD;  [facility];  Service: Orthopedics    [facility] Left 2019    Procedure: Serge bunionectomy;  Surgeon: Munir Larkin DPM;  [facility];  Service: Podiatry    [facility] Right 8/3/2020    Procedure: BUNIONECTOMY RAFAEL;  Surgeon: Munir Larkin DPM;  [facility];  Service: Podiatry    [facility] PROX PHLX OSTEOT Right 2021    Procedure: BUNIONECTOMY TAMEKA, right tameka osteotomy and 2nd claw toe correction;  Surgeon: James R Lachman, MD;  Location: [facility];  Service: Orthopedics    RI ERCP DX COLLECTION SPECIMEN BRUSHING/WASHING N/A 2018    Procedure: ENDOSCOPIC RETROGRADE  CHOLANGIOPANCREATOGRAPHY (ERCP);  Surgeon: Alfredo Messina MD;  Location: [facility];  Service: Gastroenterology    PA LAPAROSCOPY PROCTOPEXY PROLAPSE N/A 7/13/2018    Procedure: ROBOTIC SIGMOID RESECTION / RECTOPEXY;  Surgeon: ELPIDIO Mcnulty MD;  Location: [facility];  Service: Colorectal    PA OPEN TREATMENT RADIAL SHAFT FRACTURE Right 10/11/2021    Procedure: OPEN REDUCTION W/ INTERNAL FIXATION (ORIF) RADIUS (WRIST), RIGHT DISTAL;  Surgeon: Riki Ma MD;  Location: [facility];  Service: Orthopedics    PA REMOVAL IMPLANT DEEP Right 6/23/2022    Procedure: Removal of hardware volar aspect right distal radius (distal radial plate and screws);  Surgeon: Lyndon Victoria MD;  Location: [facility];  Service: Orthopedics    PA SIGMOIDOSCOPY FLX DX W/COLLJ SPEC BR/WA IF PFRMD N/A 7/13/2018    Procedure: SIGMOIDOSCOPY FLEXIBLE;  Surgeon: ELPIDIO Mcnulty MD;  [facility];  Service: Colorectal    PA TR TDN RESTORE INTRNSC FUNCJ ALL 4 FNGRS Right 6/23/2022    Procedure: Right ring finger flexor digitorum superficialis to flexor pollicis longus tendon transfer;  Surgeon: Lyndon Victoria MD;  Location: [facility];  Service: Orthopedics    TUBAL LIGATION Bilateral 1997    US GUIDED THYROID BIOPSY  7/30/2019     Social History     Substance and Sexual Activity   Alcohol Use Not Currently     Social History     Substance and Sexual Activity   Drug Use Not Currently    Types: Hydrocodone, Marijuana    Comment: JESSE Abbasi has h/o marijuana abuse- not currently     Social History     Tobacco Use   Smoking Status Never   Smokeless Tobacco Never     Family History   Problem Relation Age of Onset    Bipolar disorder Mother     Mental illness Mother         depression    Stroke Mother     Dementia Mother     Colon polyps Mother     Heart disease Father     Hypertension Father     Diabetes Father     Other Family         Back disorder    Diabetes Family     Heart disease Family     Hypertension Family      Stroke Family     Thyroid disease Family     Breast cancer Paternal Grandmother         age unknown    Breast cancer Paternal Aunt         age unknown    Breast cancer Maternal Aunt         age unknown    Mental illness Sister     Colon polyps Sister     Mental illness Sister     Heart disease Sister     No Known Problems Sister     Breast cancer Sister 68    Other Son         pituitary tumor    Hypertension Son     Obesity Son     No Known Problems Son     No Known Problems Maternal Grandmother     No Known Problems Maternal Grandfather     No Known Problems Paternal Grandfather     Breast cancer Paternal Aunt         age unknown    Substance Abuse Neg Hx         neg fam hx    Colon cancer Neg Hx          Current Outpatient Medications:     acetaminophen (TYLENOL) 500 mg tablet    albuterol (Ventolin HFA) 90 mcg/act inhaler    alendronate (Fosamax) 70 mg tablet    AMILoride 5 mg tablet    amLODIPine (NORVASC) 5 mg tablet    ascorbic acid (VITAMIN C) 500 MG tablet    aspirin 81 mg chewable tablet    budesonide-formoterol (Symbicort) 160-4.5 mcg/act inhaler    carvedilol (COREG) 25 mg tablet    cholecalciferol (VITAMIN D3) 1,000 units tablet    clonazePAM (KlonoPIN) 0.5 mg tablet    Cyanocobalamin (VITAMIN B 12 PO)    ferrous sulfate 324 (65 Fe) mg    fluvoxaMINE (LUVOX) 100 mg tablet    folic acid (FOLVITE) 1 mg tablet    haloperidol (HALDOL) 10 mg tablet    haloperidol (HALDOL) 2 mg tablet    lamoTRIgine (LaMICtal) 200 MG tablet    Multiple Vitamin (MULTI-VITAMIN) tablet    omeprazole (PriLOSEC) 40 MG capsule    ondansetron (ZOFRAN) 4 mg tablet    oxyCODONE (Roxicodone) 5 immediate release tablet    QUEtiapine (SEROquel) 400 MG tablet    rosuvastatin (CRESTOR) 20 MG tablet    traMADol (Ultram) 50 mg tablet  Allergies   Allergen Reactions    Molds & Smuts Nasal Congestion    Bee Pollen Nasal Congestion     Other reaction(s): Nasal Congestion    Pollen Extract Nasal Congestion     Reviewed medications and allergies  "and updated as indicated    PHYSICAL EXAM:    Blood pressure 128/80, height 5' 4\" (1.626 m), weight 94.8 kg (209 lb), not currently breastfeeding. Body mass index is 35.87 kg/m².  General Appearance: NAD, cooperative, alert  Eyes: Anicteric, conjunctiva pink  ENT:  Normocephalic, atraumatic, normal mucosa.    Neck:  Supple, symmetrical, trachea midline  Resp:  Clear to auscultation bilaterally; no rales, rhonchi or wheezing; respirations unlabored   CV:  S1 S2, Regular rate and rhythm; no murmur, rub, or gallop.  GI:  Soft, non-tender, non-distended; normal bowel sounds; no masses, no organomegaly   Rectal: Deferred  Musculoskeletal: No cyanosis, clubbing or edema. Normal ROM.  Skin:  No jaundice, rashes, or lesions   Heme/Lymph: No palpable cervical lymphadenopathy  Psych: Normal affect, good eye contact  Neuro: No gross deficits, AAOx3    Lab Results:   Lab Results   Component Value Date    WBC 6.17 01/23/2024    HGB 9.0 (L) 02/07/2024    HCT 29.5 (L) 02/07/2024     (H) 01/23/2024     01/23/2024     Lab Results   Component Value Date     02/27/2017    K 4.4 02/07/2024     (H) 02/07/2024    CO2 22 02/07/2024    BUN 30 (H) 02/07/2024    CREATININE 2.44 (H) 02/07/2024    GLUF 129 (H) 01/23/2024    CALCIUM 9.2 02/07/2024    CORRECTEDCA 10.2 (H) 10/05/2022    AST 16 01/23/2024    ALT 13 01/23/2024    ALKPHOS 242 (H) 01/23/2024    EGFR 20 02/07/2024       Radiology Results:   XR knee 3 vw left non injury    Result Date: 3/19/2024  Narrative: LEFT KNEE INDICATION:   Presence of left artificial knee joint. Repeated falls. Left lower quadrant pain. COMPARISON: 3/15/2024 VIEWS:  XR KNEE 3 VW LEFT NON INJURY Images: 3 FINDINGS: There is no acute fracture or dislocation. There is no joint effusion. Unremarkable appearance of total knee arthroplasty. No evidence of hardware complication. No lytic or blastic osseous lesion. Soft tissues are unremarkable.     Impression: Unremarkable appearance of total " knee arthroplasty. Electronically signed: 03/19/2024 07:40 PM Tito Cai MPH,MD    XR hip/pelv 2-3 vws left if performed    Result Date: 3/19/2024  Narrative: LEFT HIP INDICATION:   Presence of left artificial knee joint. Repeated falls. Left lower quadrant pain. COMPARISON:  None. VIEWS:  XR HIP/PELV 2-3 VWS LEFT  W PELVIS IF PERFORMED Images: 3 FINDINGS: There is no acute fracture or dislocation. No significant hip degenerative changes. No lytic or blastic osseous lesion. Soft tissues are unremarkable. The visualized lumbar spine is unremarkable.     Impression: No acute osseous abnormality. Electronically signed: 03/19/2024 06:47 PM Tito Cai MPH, MD    XR knee 4+ vw left injury    Result Date: 3/15/2024  Narrative: XR KNEE 4+ VW LEFT INJURY INDICATION: fall. COMPARISON: 2/15/2024 FINDINGS: Total arthroplasty stable hardware and bony alignment without hardware complication. No acute fracture or dislocation. No joint effusion. No significant degenerative changes. No lytic or blastic osseous lesion. Unremarkable soft tissues.     Impression: No acute osseous abnormality. Workstation performed: [id number]     XR ankle 3+ views LEFT    Result Date: 3/15/2024  Narrative: XR ANKLE 3+ VW LEFT INDICATION: fall. COMPARISON: None FINDINGS: No acute fracture or dislocation. Calcaneal small spur. First metatarsal prior osteotomy with screws. No lytic or blastic osseous lesion. Unremarkable soft tissues.     Impression: No acute osseous abnormality. Workstation performed: [id number]     1

## 2024-04-04 ENCOUNTER — EVALUATION (OUTPATIENT)
Dept: PHYSICAL THERAPY | Facility: CLINIC | Age: 64
End: 2024-04-04
Payer: MEDICARE

## 2024-04-04 DIAGNOSIS — M17.12 PRIMARY OSTEOARTHRITIS OF LEFT KNEE: Primary | ICD-10-CM

## 2024-04-04 PROCEDURE — 97112 NEUROMUSCULAR REEDUCATION: CPT | Performed by: PHYSICAL THERAPIST

## 2024-04-04 PROCEDURE — 97140 MANUAL THERAPY 1/> REGIONS: CPT | Performed by: PHYSICAL THERAPIST

## 2024-04-04 PROCEDURE — 97110 THERAPEUTIC EXERCISES: CPT | Performed by: PHYSICAL THERAPIST

## 2024-04-04 NOTE — PROGRESS NOTES
PT Re-Evaluation     Today's date: 2024  Patient name: Ayleen Moralez  : 1960  MRN: [id number]  Referring provider: Yari Wallace PA-C  Dx:   Encounter Diagnosis     ICD-10-CM    1. Primary osteoarthritis of left knee  M17.12 Ambulatory Referral to Physical Therapy      2. Primary osteoarthritis of right knee  M17.11 Ambulatory Referral to Physical Therapy          Assessment  Assessment details: Ayleen Moralez is a 62 y.o. female seen in outpatient physical therapy at [facility] with complaints of L knee pain, stiffness and weakness.  She has been treated for decreased L knee range of motion, decreased strength, limited flexibility, poor postural awareness, poor body mechanics, altered gait pattern, poor balance, decreased tolerance to activity and decreased functional mobility due to Primary osteoarthritis of left knee  Lumbar radiculopathy.  Re-evaluation was performed today to assess if she would benefit from skilled PT services in order to address these deficits and reach maximum level of function.  Thank you for the referral!    Impairments: abnormal or restricted ROM, activity intolerance, impaired balance, impaired physical strength, lacks appropriate home exercise program and pain with function    Symptom irritability: moderateUnderstanding of Dx/Px/POC: good   Prognosis: good    Goals  STG  1. Independent with HEP in 4 weeks      Goal met  2. Decrease pain at worst by 50% in 4 weeks     Good progress    LTG  1. Increase LLE strength in all planes to 5/5 in 8 weeks   Slow progress  2. Return to full, unrestricted stair & step negotiation in 8 weeks  Slow progress    Plan  Patient would benefit from: continued skilled physical therapy  Planned modality interventions: thermotherapy: hydrocollator packs  Planned therapy interventions: manual therapy, neuromuscular re-education, therapeutic activities, therapeutic exercise and home exercise program  Frequency: 2x week  Duration in weeks:  "9  Treatment plan discussed with: patient  SOC: 3/29/2024  EPOC: 2024            Subjective Evaluation    History of Present Illness  Date of onset: 2024  Mechanism of injury: Pt reports chronic history of L knee pain and weakness, worse with steps. Pt states that prednisone helps but that she is on her last dose today.   Quality of life: good    Patient Goals  Patient goals for therapy: decreased pain and increased strength  Patient goal: painfree stair & step negotiation   Pain  Current pain ratin  At best pain ratin  At worst pain rating: 10      7/10  Location: posteriormedial L knee  Quality: sharp      Diagnostic Tests  X-ray: abnormal (Moderate osteoarthritis with narrowing of the medial tibiofemoral joint and small osteophytes seen.)  Treatments  Previous treatment: injection treatment and medication        Objective     Active Range of Motion   Left Knee   Flexion: 115 degrees with pain    117 degrees with slight pain  Extension: 5 degrees with pain    3 degrees with slight pain    Right Knee   Normal active range of motion    Strength/Myotome Testing     Left Knee   Flexion: 4-    4  Extension: 4-    4  Quadriceps contraction: fair  Fair +    Right Knee   Normal strength    Tests     L TKA: 2024    EPOC: 2024      Manuals 4/4        L Knee PROM NS 8'        Gentle L tibiofemoral mobs                           Neuro Re-Ed         L quad set 5\" x 25        Heel slide (knee flexion f/b hip abd/add) x25        SLR iso 2x10        SLR hip abd SL         SAQ iso nv        LAQ iso X25 5\"        Reverse squat slant         Mini squat 15x5\"        Side stepping 3 laps                          Ther Ex         HR Slt bd 20        TR Slt bd 20        Slant board 1'        Clam shell supine nv                                   L Knee TERT with heat Into EXT         Ther Activity         LE Bike for improved L knee ROM 6' full revol.        Pt education: pathoanatomy, nature of sxs, POC, HEP      "    Gait Training         SPC  60' x2                 Modalities         ICE Prn         Heat Prn

## 2024-04-05 ENCOUNTER — TELEPHONE (OUTPATIENT)
Age: 64
End: 2024-04-05

## 2024-04-05 NOTE — TELEPHONE ENCOUNTER
Caller: Patient    Doctor: Dr. Ma    Reason for call: Patient fell about an hour ago and the incision is bleeding slightly mid incision. She states that she is walking fine and pain rated about 6/10.  Patient is wondering if she should come in to see Dr. Ma or just monitor the incision at this time.  Patient asking to speak to clinical team.    Call back#: [phone]

## 2024-04-05 NOTE — TELEPHONE ENCOUNTER
"TKA 2/5.     Spoke to patient, she reports bleeding stopped, but area \"small area\" where she hit that opened. She states \"very small\" and no longer actively bleeding.     We discussed keeping the area clean, dry and covered with dry dressing. She is aware I would notify the surgeon and be in touch.     We discussed she could ICE for support as well.   "

## 2024-04-07 ENCOUNTER — APPOINTMENT (EMERGENCY)
Dept: RADIOLOGY | Facility: HOSPITAL | Age: 64
End: 2024-04-07
Payer: MEDICARE

## 2024-04-07 ENCOUNTER — HOSPITAL ENCOUNTER (EMERGENCY)
Facility: HOSPITAL | Age: 64
Discharge: HOME | End: 2024-04-07
Attending: EMERGENCY MEDICINE | Admitting: EMERGENCY MEDICINE
Payer: MEDICARE

## 2024-04-07 VITALS
WEIGHT: 209 LBS | DIASTOLIC BLOOD PRESSURE: 70 MMHG | BODY MASS INDEX: 35.87 KG/M2 | TEMPERATURE: 98.2 F | RESPIRATION RATE: 16 BRPM | OXYGEN SATURATION: 94 % | HEART RATE: 82 BPM | SYSTOLIC BLOOD PRESSURE: 155 MMHG
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
## 2024-04-07 DIAGNOSIS — M25.562 LEFT KNEE PAIN: Primary | ICD-10-CM

## 2024-04-07 PROCEDURE — 99284 EMERGENCY DEPT VISIT MOD MDM: CPT | Performed by: PHYSICIAN ASSISTANT

## 2024-04-07 PROCEDURE — 73560 X-RAY EXAM OF KNEE 1 OR 2: CPT

## 2024-04-07 NOTE — ED PROVIDER NOTES
"History  Chief Complaint   Patient presents with    Knee Injury     To ED for Right knee pain after tripping on Friday landing on the knee.States that pain is \"too much\"     Patient is a 63-year-old white female s/p left total knee replacement 2024 with history of anxiety, hypertension, hypercholesterolemia who reports mechanical fall 2 days ago.  States she was walking into [employer] when she tripped over the curb falling onto the concrete sidewalk landing on her left knee.  She states she had small area of bleeding.  Presents today because of left knee pain.  She denies wound dehiscence, redness, warmth, fever or chills.  States she usually uses a walker to ambulate.  She was not using  the walker at the time of the fall. No head trauma or loc. No neck pain or back pain. No other complaints.  Patient reports she has been applying Betadine twice daily to the left knee        Prior to Admission Medications   Prescriptions Last Dose Informant Patient Reported? Taking?   AMILoride 5 mg tablet   No No   Sig: Take 1 tablet (5 mg total) by mouth 2 (two) times a day   Patient taking differently: Take 5 mg by mouth 2 (two) times a day   Cyanocobalamin (VITAMIN B 12 PO)   Yes No   Sig: Take 1,000 mcg by mouth in the morning   Multiple Vitamin (MULTI-VITAMIN) tablet   No No   Sig: Take 1 tablet by mouth daily   QUEtiapine (SEROquel) 400 MG tablet   No No   Sig: Quetiapine Fumarate 400m tablet at night   acetaminophen (TYLENOL) 500 mg tablet   No No   Sig: Take 2 tablets (1,000 mg total) by mouth every 8 (eight) hours   albuterol (Ventolin HFA) 90 mcg/act inhaler  Self No No   Sig: Inhale 2 puffs every 6 (six) hours as needed for wheezing or shortness of breath   alendronate (Fosamax) 70 mg tablet   No No   Sig: Take 1 tablet (70 mg total) by mouth every 7 days   amLODIPine (NORVASC) 5 mg tablet   No No   Sig: Take 1 tablet (5 mg total) by mouth daily   ascorbic acid (VITAMIN C) 500 MG tablet   No No   Sig: Take 1 " tablet (500 mg total) by mouth 2 (two) times a day   aspirin 81 mg chewable tablet   No No   Sig: Chew 1 tablet (81 mg total) 2 (two) times a day   budesonide-formoterol (Symbicort) 160-4.5 mcg/act inhaler  Self No No   Sig: Inhale 2 puffs 2 (two) times a day Rinse mouth after use.   carvedilol (COREG) 25 mg tablet   No No   Sig: Take 1 tablet (25 mg total) by mouth 2 (two) times a day with meals   cholecalciferol (VITAMIN D3) 1,000 units tablet  Self No No   Sig: Take 5 tablets (5,000 Units total) by mouth daily   clonazePAM (KlonoPIN) 0.5 mg tablet   No No   Sig: Take 1 tablet (0.5 mg total) by mouth 3 (three) times a day   ferrous sulfate 324 (65 Fe) mg   No No   Sig: Take 1 tablet (324 mg total) by mouth 2 (two) times a day before meals   fluvoxaMINE (LUVOX) 100 mg tablet   No No   Sig: Fluvoxamine 100m tablet in the morning ; 2 tablets at night   Patient taking differently: Fluvoxamine 300 mg  in the morning ;   folic acid (FOLVITE) 1 mg tablet   No No   Sig: Take 1 tablet (1 mg total) by mouth daily   haloperidol (HALDOL) 10 mg tablet   No No   Sig: Haldoperidol 10m tablet in the morning and 1 tablet at night   haloperidol (HALDOL) 2 mg tablet   No No   Sig: Haloperidol 2m tablet po daily in the afternoon, 1 tablet PRN   lamoTRIgine (LaMICtal) 200 MG tablet   No No   Sig: Lamotrigine 200m tablet in the morning and 1 tablet at night   omeprazole (PriLOSEC) 40 MG capsule   No No   Sig: Take 1 capsule (40 mg total) by mouth daily before breakfast   ondansetron (ZOFRAN) 4 mg tablet   No No   Sig: Take 1 tablet (4 mg total) by mouth every 8 (eight) hours as needed for nausea or vomiting   oxyCODONE (Roxicodone) 5 immediate release tablet   No No   Sig: Take 1 tablet (5 mg total) by mouth every 4 (four) hours as needed for severe pain Continue current therapy. Max Daily Amount: 30 mg   rosuvastatin (CRESTOR) 20 MG tablet   No No   Sig: Take 1 tablet (20 mg total) by mouth every evening   traMADol  (Ultram) 50 mg tablet   No No   Sig: Take 1 tablet (50 mg total) by mouth every 12 (twelve) hours as needed for moderate pain      Facility-Administered Medications: None       Past Medical History:   Diagnosis Date    Anxiety     Anxiety disorder     Arthritis     At risk for falls     Bipolar 2 disorder (HCC)     Chronic back pain     Chronic kidney disease     Closed fracture of distal end of right fibula with routine healing 2020    COVID-19     in 2021    CVA (cerebral vascular accident) (HCC)     noted on MRI in the past    Depression     GERD (gastroesophageal reflux disease)     Hypercholesteremia     Hypernatremia     Hypertension     Hypokalemia     Idiopathic chronic pancreatitis (HCC) 2018    Intervertebral disc disorder with radiculopathy of lumbosacral region     resolved: 2015    Kidney disease     Kidney disease     Limb alert care status     LUE-fistula    Panic attacks     Pericardial effusion     PONV (postoperative nausea and vomiting)     Psychiatric problem     Radiculitis     resolved: 2015    Secondary renal hyperparathyroidism (HCC)     Stroke ([facility])     Vitamin D deficiency        Past Surgical History:   Procedure Laterality Date    BUNIONECTOMY      Left foot     CAST APPLICATION Right 2022    Procedure: Application short-arm thumb spica splint;  Surgeon: Lyndon Victoria MD;  Location: [facility];  Service: Orthopedics    COLON SURGERY      COLONOSCOPY  2021    DILATION AND CURETTAGE OF UTERUS      INDUCED       surgically induced    MN ARTERIOVENOUS ANASTOMOSIS OPEN DIRECT Left 2019    Procedure: CREATION FISTULA ARTERIOVENOUS (AV) left wrists possible left upper;  Surgeon: Andrey Quintero MD;  Location: [facility];  Service: Vascular    MN ARTHRP KNE CONDYLE&PLATU MEDIAL&LAT COMPARTMENTS Left 2024    Procedure: ARTHROPLASTY KNEE TOTAL SAME DAY;  Surgeon: Riki Ma MD;  Location: [facility];  Service: Orthopedics    MN GULSHAN KING  VLGS BNCTY SESMDC DSTL METAR OSTEOT Left 7/1/2019    Procedure: Serge bunionectomy;  Surgeon: Munir Larkin DPM;  Location: [facility];  Service: Podiatry    CT CORRJ HLX VLGS BNCTY SESMDC DSTL METAR OSTEOT Right 8/3/2020    Procedure: BUNIONECTOMY RAFAEL;  Surgeon: Munir Larkin DPM;  Location: [facility];  Service: Podiatry    CT CORRJ HLX VLGS BNCTY SESMDC PROX PHLX OSTEOT Right 9/27/2021    Procedure: BUNIONECTOMY TAMEKA, right tameka osteotomy and 2nd claw toe correction;  Surgeon: James R Lachman, MD;  Location: [facility];  Service: Orthopedics    CT ERCP DX COLLECTION SPECIMEN BRUSHING/WASHING N/A 4/11/2018    Procedure: ENDOSCOPIC RETROGRADE CHOLANGIOPANCREATOGRAPHY (ERCP);  Surgeon: Alfredo Messina MD;  Location: [facility];  Service: Gastroenterology    CT LAPAROSCOPY PROCTOPEXY PROLAPSE N/A 7/13/2018    Procedure: ROBOTIC SIGMOID RESECTION / RECTOPEXY;  Surgeon: ELPIDIO Mcnulty MD;  Location: [facility];  Service: Colorectal    CT OPEN TREATMENT RADIAL SHAFT FRACTURE Right 10/11/2021    Procedure: OPEN REDUCTION W/ INTERNAL FIXATION (ORIF) RADIUS (WRIST), RIGHT DISTAL;  Surgeon: Riki Ma MD;  Location: [facility];  Service: Orthopedics    CT REMOVAL IMPLANT DEEP Right 6/23/2022    Procedure: Removal of hardware volar aspect right distal radius (distal radial plate and screws);  Surgeon: Lyndon Victoria MD;  Location: [facility];  Service: Orthopedics    CT SIGMOIDOSCOPY FLX DX W/COLLJ SPEC BR/WA IF PFRMD N/A 7/13/2018    Procedure: SIGMOIDOSCOPY FLEXIBLE;  Surgeon: ELPIDIO Mcnulty MD;  [facility];  Service: Colorectal    CT TR TDN RESTORE INTRNSC FUNCJ ALL 4 FNGRS Right 6/23/2022    Procedure: Right ring finger flexor digitorum superficialis to flexor pollicis longus tendon transfer;  Surgeon: Lyndon Victoria MD;  Location: [facility];  Service: Orthopedics    TUBAL LIGATION Bilateral 1997    US GUIDED THYROID BIOPSY  7/30/2019       Family History   Problem Relation Age of  Onset    Bipolar disorder Mother     Mental illness Mother         depression    Stroke Mother     Dementia Mother     Colon polyps Mother     Heart disease Father     Hypertension Father     Diabetes Father     Other Family         Back disorder    Diabetes Family     Heart disease Family     Hypertension Family     Stroke Family     Thyroid disease Family     Breast cancer Paternal Grandmother         age unknown    Breast cancer Paternal Aunt         age unknown    Breast cancer Maternal Aunt         age unknown    Mental illness Sister     Colon polyps Sister     Mental illness Sister     Heart disease Sister     No Known Problems Sister     Breast cancer Sister 68    Other Son         pituitary tumor    Hypertension Son     Obesity Son     No Known Problems Son     No Known Problems Maternal Grandmother     No Known Problems Maternal Grandfather     No Known Problems Paternal Grandfather     Breast cancer Paternal Aunt         age unknown    Substance Abuse Neg Hx         neg fam hx    Colon cancer Neg Hx      I have reviewed and agree with the history as documented.    E-Cigarette/Vaping    E-Cigarette Use Never User      E-Cigarette/Vaping Substances    Nicotine No     THC No     CBD No     Flavoring No     Other No     Unknown No      Social History     Tobacco Use    Smoking status: Never    Smokeless tobacco: Never   Vaping Use    Vaping status: Never Used   Substance Use Topics    Alcohol use: Not Currently    Drug use: Not Currently     Types: Hydrocodone, Marijuana     Comment: JESSE Abbasi has h/o marijuana abuse- not currently       Review of Systems   Constitutional:  Negative for chills and fever.   Respiratory:  Negative for shortness of breath.    Cardiovascular:  Negative for chest pain.   Musculoskeletal:  Positive for arthralgias. Negative for back pain and neck pain.   Skin:  Negative for wound.   Neurological:  Negative for numbness and headaches.       Physical Exam  Physical  Exam  Vitals and nursing note reviewed.   Constitutional:       General: She is not in acute distress.     Appearance: Normal appearance. She is not ill-appearing, toxic-appearing or diaphoretic.   HENT:      Head: Normocephalic and atraumatic.   Cardiovascular:      Rate and Rhythm: Normal rate and regular rhythm.      Pulses: Normal pulses.      Heart sounds: Normal heart sounds.   Pulmonary:      Breath sounds: Normal breath sounds.   Musculoskeletal:      Comments: Patient able to straight leg raise left leg.  Incision is clean dry and intact with Betadine stain.  No erythema or wound drainage.  No dehiscence.  There is mild diffuse tenderness to palpation to the entire anterior knee.  There is no ecchymosis.  Distal left leg sensation, motor strength, pulses intact   Skin:     General: Skin is warm and dry.      Capillary Refill: Capillary refill takes less than 2 seconds.   Neurological:      General: No focal deficit present.      Mental Status: She is alert and oriented to person, place, and time. Mental status is at baseline.         Vital Signs  ED Triage Vitals   Temperature Pulse Respirations Blood Pressure SpO2   04/07/24 0822 04/07/24 0821 04/07/24 0821 04/07/24 0821 04/07/24 0821   98.2 °F (36.8 °C) 82 16 155/70 94 %      Temp Source Heart Rate Source Patient Position - Orthostatic VS BP Location FiO2 (%)   04/07/24 0822 04/07/24 0821 -- 04/07/24 0821 --   Tympanic Monitor  Right arm       Pain Score       04/07/24 0822       10 - Worst Possible Pain           Vitals:    04/07/24 0821   BP: 155/70   Pulse: 82         Visual Acuity      ED Medications  Medications - No data to display    Diagnostic Studies  Results Reviewed       None                   XR knee 1 or 2 vw left    (Results Pending)              Procedures  Procedures         ED Course                               SBIRT 20yo+      Flowsheet Row Most Recent Value   Initial Alcohol Screen: US AUDIT-C     1. How often do you have a drink  containing alcohol? 0 Filed at: 04/07/2024 0824   2. How many drinks containing alcohol do you have on a typical day you are drinking?  0 Filed at: 04/07/2024 0824   3a. Male UNDER 65: How often do you have five or more drinks on one occasion? 0 Filed at: 04/07/2024 0824   3b. FEMALE Any Age, or MALE 65+: How often do you have 4 or more drinks on one occassion? 0 Filed at: 04/07/2024 0824   Audit-C Score 0 Filed at: 04/07/2024 0824   ZAYRA: How many times in the past year have you...    Used an illegal drug or used a prescription medication for non-medical reasons? Never Filed at: 04/07/2024 0824                      Medical Decision Making  63-year-old white female with mechanical fall 2 days ago on the left knee.  Status post left knee replacement 2 months ago.  Differential diagnosis includes left knee contusion, periprosthetic fracture.  I ordered a left knee x-ray.  I independently interpreted as no acute osseous abnormality or periprosthetic fracture.  Patient took Tylenol 2 hours prior to arrival and declined anything further for pain.  She is taking tramadol at home.  An Ace wrap placed to the left knee.  She was advised to use walker at all times.  She was advised to follow-up with orthopedist Dr. Ma this week for further evaluation.  Return precautions reviewed including increased pain, signs of infection. I tiger texted orthopedist Dr Ma     Amount and/or Complexity of Data Reviewed  Radiology: ordered.             Disposition  Final diagnoses:   Left knee pain   Fall, initial encounter     Time reflects when diagnosis was documented in both MDM as applicable and the Disposition within this note       Time User Action Codes Description Comment    4/7/2024  9:11 AM Fernando Pineda Add [M25.562] Left knee pain     4/7/2024  9:11 AM Fernando Pineda Add [W19.XXXA] Fall, initial encounter           ED Disposition       ED Disposition   Discharge    Condition   Stable    Date/Time   Sun Apr 7, 2024 0911     Comment   Ayleen Moralez discharge to home/self care.                   Follow-up Information       Follow up With Specialties Details Why Contact Info    Riki Ma MD Orthopedic Surgery   [address] [place] [id number] [phone]              Patient's Medications   Discharge Prescriptions    No medications on file       No discharge procedures on file.    PDMP Review         Value Time User    PDMP Reviewed  Yes 3/29/2024  5:36 PM Bette Harp DO            ED Provider  Electronically Signed by             Fernando Pineda PA-C  04/07/24 0914       Fernando Pineda PA-C  04/07/24 0914       Fernando Pineda PA-C  04/07/24 0922

## 2024-04-07 NOTE — DISCHARGE INSTRUCTIONS
Elevate your leg    May take tylenol every 6 hours as needed for pain     Use your walker     Follow up with your orthopedist - Dr Ma- this week for recheck    Return to ED for increased pain, worsening symptoms

## 2024-04-08 ENCOUNTER — PATIENT OUTREACH (OUTPATIENT)
Dept: CASE MANAGEMENT | Facility: HOSPITAL | Age: 64
End: 2024-04-08

## 2024-04-08 ENCOUNTER — VBI (OUTPATIENT)
Dept: FAMILY MEDICINE CLINIC | Facility: HOSPITAL | Age: 64
End: 2024-04-08

## 2024-04-08 DIAGNOSIS — Z71.89 COMPLEX CARE COORDINATION: Primary | ICD-10-CM

## 2024-04-08 NOTE — PROGRESS NOTES
Post ED referral received and chart review completed. Pt reported to [facility] ED after experiencing a fall and landing on knee. Past history of Left TKA 2/5/24. XR negative and was discharged.    Call placed to Ayleen. Introduced self as RN CM and reason for call. She states that she is doing ok. Still experiences pain in left knee but she understands it will take time to heal. She did have some bleeding to the area. She states that has resolved and no longer has open area. She did call and report fall to ortho and they provided her with instructions. She states that she has an appointment with them tomorrow for follow up as well.    Aylene states that she has all medications. Offered med rec/review however she declines. She states she has no questions and has been managing her own health just fine.   She denies any transportation difficulties and she states she takes herself to her appointments.     Explained and offered care management. She declines need at this time and states she is able to get in touch with her providers if needed. Informed her RN CM is available should needs change. She denies any other questions.     Referral closed at this time and note routed to Sherie Wallace RN CM.

## 2024-04-08 NOTE — TELEPHONE ENCOUNTER
04/08/24 9:17 AM    Patient contacted post ED visit, [facility] department spoke with patient/caregiver and outreach was successful.    Thank you.  Juve Sierra MA  [facility]

## 2024-04-09 ENCOUNTER — OFFICE VISIT (OUTPATIENT)
Dept: PHYSICAL THERAPY | Facility: CLINIC | Age: 64
End: 2024-04-09
Payer: MEDICARE

## 2024-04-09 ENCOUNTER — APPOINTMENT (OUTPATIENT)
Dept: RADIOLOGY | Facility: CLINIC | Age: 64
End: 2024-04-09
Payer: MEDICARE

## 2024-04-09 ENCOUNTER — OFFICE VISIT (OUTPATIENT)
Dept: OBGYN CLINIC | Facility: CLINIC | Age: 64
End: 2024-04-09

## 2024-04-09 VITALS
DIASTOLIC BLOOD PRESSURE: 80 MMHG | BODY MASS INDEX: 35.68 KG/M2 | SYSTOLIC BLOOD PRESSURE: 140 MMHG | WEIGHT: 209 LBS | HEIGHT: 64 IN

## 2024-04-09 DIAGNOSIS — Z96.652 AFTERCARE FOLLOWING LEFT KNEE JOINT REPLACEMENT SURGERY: ICD-10-CM

## 2024-04-09 DIAGNOSIS — Z96.652 AFTERCARE FOLLOWING LEFT KNEE JOINT REPLACEMENT SURGERY: Primary | ICD-10-CM

## 2024-04-09 DIAGNOSIS — Z47.1 AFTERCARE FOLLOWING LEFT KNEE JOINT REPLACEMENT SURGERY: ICD-10-CM

## 2024-04-09 DIAGNOSIS — M17.12 PRIMARY OSTEOARTHRITIS OF LEFT KNEE: Primary | ICD-10-CM

## 2024-04-09 DIAGNOSIS — Z47.1 AFTERCARE FOLLOWING LEFT KNEE JOINT REPLACEMENT SURGERY: Primary | ICD-10-CM

## 2024-04-09 PROCEDURE — 97530 THERAPEUTIC ACTIVITIES: CPT | Performed by: PHYSICAL THERAPIST

## 2024-04-09 PROCEDURE — 97110 THERAPEUTIC EXERCISES: CPT | Performed by: PHYSICAL THERAPIST

## 2024-04-09 PROCEDURE — 73562 X-RAY EXAM OF KNEE 3: CPT

## 2024-04-09 PROCEDURE — 97112 NEUROMUSCULAR REEDUCATION: CPT | Performed by: PHYSICAL THERAPIST

## 2024-04-09 PROCEDURE — 99024 POSTOP FOLLOW-UP VISIT: CPT | Performed by: ORTHOPAEDIC SURGERY

## 2024-04-09 NOTE — PROGRESS NOTES
Assessment:     1. Aftercare following left knee joint replacement surgery        Plan:     Problem List Items Addressed This Visit          Surgery/Wound/Pain    Aftercare following left knee joint replacement surgery - Primary     Ayleen is status post left total knee arthroplasty on February 5, 2024 with multiple falls.  X-rays were reviewed with her that reveals a well aligned prosthesis with no acute fracture.  No loosening of prosthesis.  Educated patient on how she needs to slow down and take her time when getting around.  She is to continue to use the walker for assistance until she gains more strength to be able to transition to a cane.  She will continue formal physical therapy.  Follow-up as scheduled in May 2024.  All patient's questions were answered to her satisfaction.  This note is created using dictation transcription.  It may contain typographical errors, grammatical errors, improperly dictated words, background noise and other errors.         Relevant Orders    XR knee 3 vw left non injury      Subjective:     Patient ID: Ayleen Moralez is a 63 y.o. female.  Chief Complaint:  This is a 63-year-old white female who is status post left total knee arthroplasty on February 5, 2024.  She is currently ambulating with a walker.  She is attending outpatient physical therapy.  She has experienced a few falls since surgery.  Most recent fall was on April 5, 2024.  She was walking into a store and decided to not take her walker with her.  She tripped stepping up onto the curb landing directly on the left knee.  She was able to ambulate afterwards.      Allergy:  Allergies   Allergen Reactions    Molds & Smuts Nasal Congestion    Bee Pollen Nasal Congestion     Other reaction(s): Nasal Congestion    Pollen Extract Nasal Congestion     Medications:  all current active meds have been reviewed  Past Medical History:  Past Medical History:   Diagnosis Date    Anxiety     Anxiety disorder     Arthritis     At  risk for falls     Bipolar 2 disorder (HCC)     Chronic back pain     Chronic kidney disease     Closed fracture of distal end of right fibula with routine healing 2020    COVID-19     in 2021    CVA (cerebral vascular accident) (HCC)     noted on MRI in the past    Depression     GERD (gastroesophageal reflux disease)     Hypercholesteremia     Hypernatremia     Hypertension     Hypokalemia     Idiopathic chronic pancreatitis (HCC) 2018    Intervertebral disc disorder with radiculopathy of lumbosacral region     resolved: 2015    Kidney disease     Kidney disease     Limb alert care status     LUE-fistula    Panic attacks     Pericardial effusion     PONV (postoperative nausea and vomiting)     Psychiatric problem     Radiculitis     resolved: 2015    Secondary renal hyperparathyroidism (HCC)     Stroke (HCC)     Vitamin D deficiency      Past Surgical History:  Past Surgical History:   Procedure Laterality Date    BUNIONECTOMY      Left foot     CAST APPLICATION Right 2022    Procedure: Application short-arm thumb spica splint;  Surgeon: Lyndon Victoria MD;  Location: [facility];  Service: Orthopedics    COLON SURGERY      COLONOSCOPY  2021    DILATION AND CURETTAGE OF UTERUS      INDUCED       surgically induced    RI ARTERIOVENOUS ANASTOMOSIS OPEN DIRECT Left 2019    Procedure: CREATION FISTULA ARTERIOVENOUS (AV) left wrists possible left upper;  Surgeon: Andrey Quintero MD;  Location:  MAIN OR;  Service: Vascular    RI ARTHRP KNE CONDYLE&PLATU MEDIAL&LAT COMPARTMENTS Left 2024    Procedure: ARTHROPLASTY KNEE TOTAL SAME DAY;  Surgeon: Riki Ma MD;  Location:  MAIN OR;  Service: Orthopedics    RI [facility] DSTL METAR OSTEOT Left 2019    Procedure: Serge bunionectomy;  Surgeon: Munir Larkin DPM;  Location:  MAIN OR;  Service: Podiatry    RI CORR HLX GS BNCTY SESMDC DSTL METAR OSTEOT Right 8/3/2020    Procedure:  BUNIONECTOMY RAFAEL;  Surgeon: Munir Larkin DPM;  Location: [facility];  Service: Podiatry    IL CORRJ HLX VLGS BNCTY SESMDC PROX PHLX OSTEOT Right 9/27/2021    Procedure: BUNIONECTOMY TAMEKA, right tameka osteotomy and 2nd claw toe correction;  Surgeon: James R Lachman, MD;  Location: [facility];  Service: Orthopedics    IL ERCP DX COLLECTION SPECIMEN BRUSHING/WASHING N/A 4/11/2018    Procedure: ENDOSCOPIC RETROGRADE CHOLANGIOPANCREATOGRAPHY (ERCP);  Surgeon: Alfredo Messina MD;  Location: [facility];  Service: Gastroenterology    IL LAPAROSCOPY PROCTOPEXY PROLAPSE N/A 7/13/2018    Procedure: ROBOTIC SIGMOID RESECTION / RECTOPEXY;  Surgeon: ELPIDIO Mcnulty MD;  Location: [facility];  Service: Colorectal    IL OPEN TREATMENT RADIAL SHAFT FRACTURE Right 10/11/2021    Procedure: OPEN REDUCTION W/ INTERNAL FIXATION (ORIF) RADIUS (WRIST), RIGHT DISTAL;  Surgeon: Riki Ma MD;  Location: [facility];  Service: Orthopedics    IL REMOVAL IMPLANT DEEP Right 6/23/2022    Procedure: Removal of hardware volar aspect right distal radius (distal radial plate and screws);  Surgeon: Lyndon Victoria MD;  Location: [facility];  Service: Orthopedics    IL SIGMOIDOSCOPY FLX DX W/COLLJ SPEC BR/WA IF PFRMD N/A 7/13/2018    Procedure: SIGMOIDOSCOPY FLEXIBLE;  Surgeon: ELPIDIO Mcnulty MD;  [facility];  Service: Colorectal    IL TR TDN RESTORE INTRNSC FUNCJ ALL 4 FNGRS Right 6/23/2022    Procedure: Right ring finger flexor digitorum superficialis to flexor pollicis longus tendon transfer;  Surgeon: Lyndon Victoria MD;  Location: [facility];  Service: Orthopedics    TUBAL LIGATION Bilateral 1997    US GUIDED THYROID BIOPSY  7/30/2019     Family History:  Family History   Problem Relation Age of Onset    Bipolar disorder Mother     Mental illness Mother         depression    Stroke Mother     Dementia Mother     Colon polyps Mother     Heart disease Father     Hypertension Father     Diabetes Father     Other Family  "        Back disorder    Diabetes Family     Heart disease Family     Hypertension Family     Stroke Family     Thyroid disease Family     Breast cancer Paternal Grandmother         age unknown    Breast cancer Paternal Aunt         age unknown    Breast cancer Maternal Aunt         age unknown    Mental illness Sister     Colon polyps Sister     Mental illness Sister     Heart disease Sister     No Known Problems Sister     Breast cancer Sister 68    Other Son         pituitary tumor    Hypertension Son     Obesity Son     No Known Problems Son     No Known Problems Maternal Grandmother     No Known Problems Maternal Grandfather     No Known Problems Paternal Grandfather     Breast cancer Paternal Aunt         age unknown    Substance Abuse Neg Hx         neg fam hx    Colon cancer Neg Hx      Social History:  Social History     Substance and Sexual Activity   Alcohol Use Not Currently     Social History     Substance and Sexual Activity   Drug Use Not Currently    Types: Hydrocodone, Marijuana    Comment: MEDICATION   Ayleen has h/o marijuana abuse- not currently     Social History     Tobacco Use   Smoking Status Never   Smokeless Tobacco Never     Review of Systems   Constitutional: Negative.    HENT: Negative.     Eyes: Negative.    Respiratory: Negative.     Cardiovascular: Negative.    Gastrointestinal: Negative.    Endocrine: Negative.    Genitourinary: Negative.    Musculoskeletal:  Positive for arthralgias (left knee), gait problem (using a walker) and joint swelling (left knee).   Skin: Negative.    Allergic/Immunologic: Negative.    Hematological: Negative.    Psychiatric/Behavioral: Negative.           Objective:  BP Readings from Last 1 Encounters:   04/09/24 140/80      Wt Readings from Last 1 Encounters:   04/09/24 94.8 kg (209 lb)      BMI:   Estimated body mass index is 35.87 kg/m² as calculated from the following:    Height as of this encounter: 5' 4\" (1.626 m).    Weight as of this encounter: " "94.8 kg (209 lb).  BSA:   Estimated body surface area is 1.99 meters squared as calculated from the following:    Height as of this encounter: 5' 4\" (1.626 m).    Weight as of this encounter: 94.8 kg (209 lb).   Physical Exam  Constitutional:       General: She is not in acute distress.     Appearance: She is well-developed.   HENT:      Head: Normocephalic.   Eyes:      Conjunctiva/sclera: Conjunctivae normal.      Pupils: Pupils are equal, round, and reactive to light.   Pulmonary:      Effort: Pulmonary effort is normal. No respiratory distress.   Skin:     General: Skin is warm and dry.   Neurological:      Mental Status: She is alert and oriented to person, place, and time.   Psychiatric:         Behavior: Behavior normal.       Left Knee Exam     Tenderness   The patient is experiencing no tenderness.     Range of Motion   Extension:  0   Flexion:  120     Tests   Varus: negative Valgus: negative    Other   Erythema: absent  Scars: present (Well-healed incision with no evidence of infection.)  Sensation: normal  Pulse: present  Swelling: mild    Comments:  Calf soft, nontender            I have personally reviewed pertinent films in PACS and my interpretation is x-rays of left knee reveal a well aligned prosthesis with no evidence of loosening or acute fracture.    Scribe Attestation      I,:  Yari Wallace PA-C am acting as a scribe while in the presence of the attending physician.:       I,:  Riki Ma MD personally performed the services described in this documentation    as scribed in my presence.:            "

## 2024-04-09 NOTE — ASSESSMENT & PLAN NOTE
Ayleen is status post left total knee arthroplasty on February 5, 2024 with multiple falls.  X-rays were reviewed with her that reveals a well aligned prosthesis with no acute fracture.  No loosening of prosthesis.  Educated patient on how she needs to slow down and take her time when getting around.  She is to continue to use the walker for assistance until she gains more strength to be able to transition to a cane.  She will continue formal physical therapy.  Follow-up as scheduled in May 2024.  All patient's questions were answered to her satisfaction.  This note is created using dictation transcription.  It may contain typographical errors, grammatical errors, improperly dictated words, background noise and other errors.

## 2024-04-09 NOTE — PROGRESS NOTES
"Progress Note    Today's date: 2024  Patient name: Ayleen Moralez  : 1960  MRN: [id number]  Referring provider: Yari Wallace PA-C  Dx:   Encounter Diagnosis     ICD-10-CM    1. Primary osteoarthritis of left knee  M17.12         Subjective: Compliant with HEP, no questions regarding POC, motivated to continue PT      Objective: See treatment diary below    Assessment: Tolerated treatment well with progression of treatment program as noted below requiring verbal and tactile cues from PT for safe execution of therapeutic exercise. Patient demonstrated fatigue post treatment, exhibited good technique with therapeutic exercises, however pt's full, pain-free L knee mobility & stability remain limited at this time, secondary to TKA, contributing to functional deficits, and would benefit from continued PT      Plan: Continue per plan of care.  Progress treatment as tolerated.       L TKA 2024    EPOC: 2024      Manuals 4/9          L Knee PROM           Gentle L tibiofemoral mobs                                 Neuro Re-Ed           L quad set 5\" x 25          Heel slide (knee flexion f/b hip abd/add) x25          SLR iso 2x10           SAQ iso X30 5\"          LAQ iso X30 2lb          Reverse squat slant                      Ther Ex           HR 30          TR 30          Slant board 10\"x10 was having some pain          Clam shell supine           Standing hip ext & abd X30 2lb                                L Knee TERT with heat Into EXT 8'          Ther Activity           LE Bike for improved L knee ROM 6'          Pt education: pathoanatomy, nature of sxs, POC, HEP 2'          Gait Training           SPC                       Modalities           ICE Prn           Heat Prn             "

## 2024-04-11 ENCOUNTER — OFFICE VISIT (OUTPATIENT)
Dept: PHYSICAL THERAPY | Facility: CLINIC | Age: 64
End: 2024-04-11
Payer: MEDICARE

## 2024-04-11 DIAGNOSIS — M17.12 PRIMARY OSTEOARTHRITIS OF LEFT KNEE: Primary | ICD-10-CM

## 2024-04-11 PROCEDURE — 97110 THERAPEUTIC EXERCISES: CPT

## 2024-04-11 PROCEDURE — 97112 NEUROMUSCULAR REEDUCATION: CPT

## 2024-04-11 PROCEDURE — 97140 MANUAL THERAPY 1/> REGIONS: CPT

## 2024-04-11 NOTE — PROGRESS NOTES
"Daily Note     Today's date: 2024  Patient name: Ayleen Moralez  : 1960  MRN: [id number]  Referring provider: Yari Wallace PA-C  Dx:   Encounter Diagnosis     ICD-10-CM    1. Primary osteoarthritis of left knee  M17.12                      Subjective: Pt offers no new c/o's.  Pt reports falling last Fri and saw the surgeon who stated everything was OK in the jt.  3    Objective: See treatment diary below      Assessment: Tolerated treatment fair. Pt givne trial of SPC, seems much safer w/ walker and instructed to practice cane in PT and outside of PT use the walker.   Patient demonstrated fatigue post treatment and would benefit from continued PT.      Plan: Continue per plan of care.  Progress treatment as tolerated.       L TKA 2024    EPOC: 2024      Manuals          L Knee PROM  JK         Gentle L tibiofemoral mobs           Patella PROM  JK                    Neuro Re-Ed           L quad set 5\" x 25 \" x 25         Heel slide (knee flexion f/b hip abd/add) x25 30         SLR iso 2x10  20         SAQ iso X30 5\" X30 5\"         LAQ iso X30 2lb X30 2lb         Reverse squat slant  Mini 20 x5\"         bridge  20x5\"         Ther Ex           HR 30 30         TR 30 30         Slant board 10\"x10 was having some pain np         Clam shell supine           Standing hip ext & abd X30 2lb GTB 10xea                               L Knee TERT with heat Into EXT 8'          Ther Activity                      LE Bike for improved L knee ROM 6' 6'         Pt education: pathoanatomy, nature of sxs, POC, HEP 2'          Gait Training           SPC                       Modalities           ICE Prn           Heat Prn                  "

## 2024-04-15 ENCOUNTER — TELEPHONE (OUTPATIENT)
Dept: PULMONOLOGY | Facility: CLINIC | Age: 64
End: 2024-04-15

## 2024-04-15 ENCOUNTER — OFFICE VISIT (OUTPATIENT)
Dept: CARDIOLOGY CLINIC | Facility: CLINIC | Age: 64
End: 2024-04-15
Payer: MEDICARE

## 2024-04-15 VITALS
WEIGHT: 211 LBS | HEIGHT: 64 IN | HEART RATE: 88 BPM | DIASTOLIC BLOOD PRESSURE: 74 MMHG | BODY MASS INDEX: 36.02 KG/M2 | SYSTOLIC BLOOD PRESSURE: 144 MMHG | OXYGEN SATURATION: 96 %

## 2024-04-15 DIAGNOSIS — I10 ESSENTIAL HYPERTENSION: ICD-10-CM

## 2024-04-15 DIAGNOSIS — I35.0 NONRHEUMATIC AORTIC VALVE STENOSIS: Primary | ICD-10-CM

## 2024-04-15 PROCEDURE — 99214 OFFICE O/P EST MOD 30 MIN: CPT | Performed by: INTERNAL MEDICINE

## 2024-04-15 NOTE — PROGRESS NOTES
Cardiology Follow Up    Ayleen Martinezanalilia  1960  [id number]  [facility] [address]  [facility] [place] [id number] [phone]    1. Nonrheumatic aortic valve stenosis  Echo complete w/ contrast if indicated      2. Essential hypertension            Interval History: Followup for Hypertension, aortic stenosis.    Patient has no chest pain, dyspnea or palpitations.     Medical Problems       Problem List       Hypercholesteremia (Chronic)    Overview Signed 3/20/2018  9:51 AM by Bette Harp,      Dx age 52         Spondylolisthesis at L5-S1 level    Renal cyst    Vitamin D deficiency    Secondary renal hyperparathyroidism (HCC)    Herniated nucleus pulposus, L5-S1    Idiopathic chronic pancreatitis (HCC)    Primary osteoarthritis of right knee    Overview Signed 3/20/2018  9:45 AM by Bette Harp,      Work injury 2013 approx- seen at [place]         Age-related osteoporosis without current pathological fracture    Overview Addendum 1/2/2019 10:40 AM by Bette Harp,      Had left ankle fracture age 55   clavicle fracture as child- skateboard   had dexascan dec 2017         Cardiac murmur    Overview Signed 1/2/2019 10:39 AM by Bette Harp,      Echo done 6/2018- mild pulmonary valve regurg and normal lv ej fraction- grade 1 diastolic dysfunction noted         Rectal prolapse    Overview Signed 7/30/2018 10:31 AM by Bette Harp DO     Surgery done July 2018- Dr. Simon         Elevated LFTs    Primary hypertension    Hyperprolactinemia (HCC)    Overview Signed 1/2/2019 10:36 AM by Bette Harp, DO     Was dx in approx in her 30's with amenorrhea- had pitiutary ? Cyst- last mri over 10 years ago in [facility]         Obsessive compulsive disorder    Overview Signed 1/23/2019  9:13 AM by Bette Harp, DO     [facility] 11/2018         Panic disorder with agoraphobia    Overview Signed 1/23/2019  9:14 AM by Bette  DO Loyd     From [facility] notes11/2018         Eating disorder    Overview Signed 1/23/2019  9:14 AM by Bette Harp DO     From [facility] 11/2018         Hyperparathyroidism (HCC)    Benign neoplasm of colon    Drug-induced constipation    Infarction of left basal ganglia ([facility])    Overview Signed 5/1/2019  9:54 AM by Bette Harp DO     found on  Mri 4/2019         Abnormal chest CT    Hyperphosphatemia    Abnormal thyroid exam    Thyroid nodule    Moderate persistent asthma without complication    Primary osteoarthritis of left knee    Steal syndrome dialysis vascular access ([facility]ly [facility])    AVF (arteriovenous fistula) (Formerly [facility])    Right patellofemoral syndrome    CKD (chronic kidney disease) stage 4, GFR 15-29 ml/min (Formerly [facility])    Lab Results   Component Value Date    EGFR 20 02/07/2024    EGFR 21 02/06/2024    EGFR 19 02/05/2024    CREATININE 2.44 (H) 02/07/2024    CREATININE 2.33 (H) 02/06/2024    CREATININE 2.55 (H) 02/05/2024         Bilateral sacroiliitis ([facility])    Hallux valgus, right    Acquired hallux interphalangeus of right foot    Effusion of right knee    Left knee tendonitis    Family history of cardiac disorder in father    Anxiety    Claw toe, acquired, right    Injury of plantar plate, right, initial encounter    Acquired hallux interphalangeus, right    Closed Colles' fracture of right radius    Aftercare following surgery of the musculoskeletal system    Acute shoulder pain    Essential hypertension    Nausea    GERD (gastroesophageal reflux disease)    End stage renal disease ([facility])    Lab Results   Component Value Date    EGFR 20 02/07/2024    EGFR 21 02/06/2024    EGFR 19 02/05/2024    CREATININE 2.44 (H) 02/07/2024    CREATININE 2.33 (H) 02/06/2024    CREATININE 2.55 (H) 02/05/2024         Injury of right thumb    Pain of right upper extremity    Continuous opioid dependence (HCC)    Severe depressed bipolar I disorder without psychotic features ([facility])    Mixed obsessional thoughts and acts     Eating disorder, unspecified    Chronic back pain (Chronic)    Obesity, morbid (HCC)    Pes anserinus bursitis of both knees    Umbilical hernia    Shortness of breath    Leg swelling    Obesity (BMI 30-39.9)    Concussion without loss of consciousness    Pulmonary embolism with infarction (HCC)    ILD (interstitial lung disease) (HCC)    Statin intolerance    Moderate aortic stenosis    Bipolar I disorder, current or most recent episode manic, severe with mood-incongruent psychotic features (HCC)    Generalized anxiety disorder    Primary localized osteoarthritis of knees, bilateral    Acute pain of left knee    Aftercare following left knee joint replacement surgery        Past Medical History:   Diagnosis Date    Anxiety     Anxiety disorder     Arthritis     At risk for falls     Bipolar 2 disorder (HCC)     Chronic back pain     Chronic kidney disease     Closed fracture of distal end of right fibula with routine healing 11/04/2020    COVID-19     in Jan 2021    CVA (cerebral vascular accident) (HCC)     noted on MRI in the past    Depression     GERD (gastroesophageal reflux disease)     Hypercholesteremia     Hypernatremia     Hypertension     Hypokalemia     Idiopathic chronic pancreatitis ([facility]) 03/20/2018    Intervertebral disc disorder with radiculopathy of lumbosacral region     resolved: 12/28/2015    Kidney disease     Kidney disease     Limb alert care status     LUE-fistula    Panic attacks     Pericardial effusion     PONV (postoperative nausea and vomiting)     Psychiatric problem     Radiculitis     resolved: 12/28/2015    Secondary renal hyperparathyroidism (HCC)     Stroke ([facility])     Vitamin D deficiency      Social History     Socioeconomic History    Marital status:      Spouse name: Not on file    Number of children: Not on file    Years of education: Not on file    Highest education level: Not on file   Occupational History    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    No living will.     Has dentures---no dental care.     Primary language--English.     Feels safe at home.     Social Determinants of Health     Financial Resource Strain: Medium Risk (12/7/2022)    Overall Financial Resource Strain (CARDIA)     Difficulty of Paying Living Expenses: Somewhat hard   Food Insecurity: No Food Insecurity (2/6/2024)    Hunger Vital Sign     Worried About Running Out of Food in the Last Year: Never true     Ran Out of Food in the Last Year: Never true   Transportation Needs: No Transportation Needs (2/6/2024)    PRAPARE - Transportation     Lack of Transportation (Medical): No     Lack of Transportation (Non-Medical): No   Physical Activity: Inactive (4/19/2021)    Exercise Vital Sign     Days of Exercise per Week: 0 days     Minutes of Exercise per Session: 0 min   Stress: Stress Concern Present (4/19/2021)    [place] citizen [place] [employer] - Occupational Stress Questionnaire     Feeling of Stress : To some extent   Social Connections: Not on file   Intimate Partner Violence: Not At Risk (4/19/2021)    Humiliation, Afraid, Rape, and Kick questionnaire     Fear of Current or Ex-Partner: No     Emotionally Abused: No     Physically Abused: No     Sexually Abused: No   Housing Stability: Low Risk  (2/6/2024)    Housing Stability Vital Sign     Unable to Pay for Housing in the Last Year: No     Number of Places Lived in the Last Year: 1     Unstable Housing in the Last Year: No      Family History   Problem Relation Age of Onset    Bipolar  disorder Mother     Mental illness Mother         depression    Stroke Mother     Dementia Mother     Colon polyps Mother     Heart disease Father     Hypertension Father     Diabetes Father     Other Family         Back disorder    Diabetes Family     Heart disease Family     Hypertension Family     Stroke Family     Thyroid disease Family     Breast cancer Paternal Grandmother         age unknown    Breast cancer Paternal Aunt         age unknown    Breast cancer Maternal Aunt         age unknown    Mental illness Sister     Colon polyps Sister     Mental illness Sister     Heart disease Sister     No Known Problems Sister     Breast cancer Sister 68    Other Son         pituitary tumor    Hypertension Son     Obesity Son     No Known Problems Son     No Known Problems Maternal Grandmother     No Known Problems Maternal Grandfather     No Known Problems Paternal Grandfather     Breast cancer Paternal Aunt         age unknown    Substance Abuse Neg Hx         neg fam hx    Colon cancer Neg Hx      Past Surgical History:   Procedure Laterality Date    BUNIONECTOMY      Left foot     CAST APPLICATION Right 2022    Procedure: Application short-arm thumb spica splint;  Surgeon: Lyndon Victoria MD;  Location: [facility];  Service: Orthopedics    COLON SURGERY      COLONOSCOPY  2021    DILATION AND CURETTAGE OF UTERUS      INDUCED       surgically induced    NY ARTERIOVENOUS ANASTOMOSIS OPEN DIRECT Left 2019    Procedure: CREATION FISTULA ARTERIOVENOUS (AV) left wrists possible left upper;  Surgeon: Andrey Quintero MD;  Location: [facility];  Service: Vascular    NY ARTHRP KNE CONDYLE&PLATU MEDIAL&LAT COMPARTMENTS Left 2024    Procedure: ARTHROPLASTY KNEE TOTAL SAME DAY;  Surgeon: Riki Ma MD;  Location: [facility];  Service: Orthopedics    NY CORRJ HLX VLGS BNCTY SESMDC DSTL METAR OSTEOT Left 2019    Procedure: Serge bunionectomy;  Surgeon: Munir Larkin DPM;  [facility];   Service: Podiatry    OK CORRJ HLX VLGS BNCTY [facility] DSTL METAR OSTEOT Right 8/3/2020    Procedure: BUNIONECTOMY RAFAEL;  Surgeon: Munir Larkin DPM;  Location: [facility];  Service: Podiatry    OK CORRJ HLX VLGS BNCTY SESMDC PROX PHLX OSTEOT Right 9/27/2021    Procedure: BUNIONECTOMY TAMEKA, right tameka osteotomy and 2nd claw toe correction;  Surgeon: James R Lachman, MD;  Location: [facility];  Service: Orthopedics    OK ERCP DX COLLECTION SPECIMEN BRUSHING/WASHING N/A 4/11/2018    Procedure: ENDOSCOPIC RETROGRADE CHOLANGIOPANCREATOGRAPHY (ERCP);  Surgeon: Alfredo Messina MD;  [facility];  Service: Gastroenterology    OK LAPAROSCOPY PROCTOPEXY PROLAPSE N/A 7/13/2018    Procedure: ROBOTIC SIGMOID RESECTION / RECTOPEXY;  Surgeon: ELPIDIO Mcnulty MD;  [facility];  Service: Colorectal    OK OPEN TREATMENT RADIAL SHAFT FRACTURE Right 10/11/2021    Procedure: OPEN REDUCTION W/ INTERNAL FIXATION (ORIF) RADIUS (WRIST), RIGHT DISTAL;  Surgeon: Riki Ma MD;  [facility]: [facility];  Service: Orthopedics    OK REMOVAL IMPLANT DEEP Right 6/23/2022    Procedure: Removal of hardware volar aspect right distal radius (distal radial plate and screws);  Surgeon: Lyndon Victoria MD;  Location: [facility];  Service: Orthopedics    OK SIGMOIDOSCOPY FLX DX W/COLLJ SPEC BR/WA IF PFRMD N/A 7/13/2018    Procedure: SIGMOIDOSCOPY FLEXIBLE;  Surgeon: ELPIDIO Mcnulty MD;  [facility];  Service: Colorectal    OK TR TDN RESTORE INTRNSC FUNCJ ALL 4 FNGRS Right 6/23/2022    Procedure: Right ring finger flexor digitorum superficialis to flexor pollicis longus tendon transfer;  Surgeon: Lyndon Victoria MD;  Location: [facility];  Service: Orthopedics    TUBAL LIGATION Bilateral 1997    US GUIDED THYROID BIOPSY  7/30/2019       Current Outpatient Medications:     acetaminophen (TYLENOL) 500 mg tablet, Take 2 tablets (1,000 mg total) by mouth every 8 (eight) hours, Disp: 60 tablet, Rfl: 0    albuterol  (Ventolin HFA) 90 mcg/act inhaler, Inhale 2 puffs every 6 (six) hours as needed for wheezing or shortness of breath, Disp: 8.5 g, Rfl: 3    alendronate (Fosamax) 70 mg tablet, Take 1 tablet (70 mg total) by mouth every 7 days, Disp: 4 tablet, Rfl: 5    AMILoride 5 mg tablet, Take 1 tablet (5 mg total) by mouth 2 (two) times a day (Patient taking differently: Take 5 mg by mouth 2 (two) times a day), Disp: 180 tablet, Rfl: 3    amLODIPine (NORVASC) 5 mg tablet, Take 1 tablet (5 mg total) by mouth daily, Disp: 90 tablet, Rfl: 3    ascorbic acid (VITAMIN C) 500 MG tablet, Take 1 tablet (500 mg total) by mouth 2 (two) times a day, Disp: 60 tablet, Rfl: 2    aspirin 81 mg chewable tablet, Chew 1 tablet (81 mg total) 2 (two) times a day, Disp: 60 tablet, Rfl: 0    budesonide-formoterol (Symbicort) 160-4.5 mcg/act inhaler, Inhale 2 puffs 2 (two) times a day Rinse mouth after use., Disp: 10.2 g, Rfl: 11    carvedilol (COREG) 25 mg tablet, Take 1 tablet (25 mg total) by mouth 2 (two) times a day with meals, Disp: 180 tablet, Rfl: 3    cholecalciferol (VITAMIN D3) 1,000 units tablet, Take 5 tablets (5,000 Units total) by mouth daily, Disp: 90 tablet, Rfl: 5    clonazePAM (KlonoPIN) 0.5 mg tablet, Take 1 tablet (0.5 mg total) by mouth 3 (three) times a day, Disp: 270 tablet, Rfl: 0    Cyanocobalamin (VITAMIN B 12 PO), Take 1,000 mcg by mouth in the morning, Disp: , Rfl:     ferrous sulfate 324 (65 Fe) mg, Take 1 tablet (324 mg total) by mouth 2 (two) times a day before meals, Disp: 60 tablet, Rfl: 2    fluvoxaMINE (LUVOX) 100 mg tablet, Fluvoxamine 100m tablet in the morning ; 2 tablets at night (Patient taking differently: Fluvoxamine 300 mg  in the morning ;), Disp: 270 tablet, Rfl: 1    folic acid (FOLVITE) 1 mg tablet, Take 1 tablet (1 mg total) by mouth daily, Disp: 30 tablet, Rfl: 2    haloperidol (HALDOL) 10 mg tablet, Haldoperidol 10m tablet in the morning and 1 tablet at night, Disp: 60 tablet, Rfl: 2     haloperidol (HALDOL) 2 mg tablet, Haloperidol 2m tablet po daily in the afternoon, 1 tablet PRN, Disp: 60 tablet, Rfl: 2    lamoTRIgine (LaMICtal) 200 MG tablet, Lamotrigine 200m tablet in the morning and 1 tablet at night, Disp: 60 tablet, Rfl: 1    Multiple Vitamin (MULTI-VITAMIN) tablet, Take 1 tablet by mouth daily, Disp: 30 tablet, Rfl: 2    omeprazole (PriLOSEC) 40 MG capsule, Take 1 capsule (40 mg total) by mouth daily before breakfast, Disp: 90 capsule, Rfl: 1    ondansetron (ZOFRAN) 4 mg tablet, Take 1 tablet (4 mg total) by mouth every 8 (eight) hours as needed for nausea or vomiting, Disp: 30 tablet, Rfl: 0    oxyCODONE (Roxicodone) 5 immediate release tablet, Take 1 tablet (5 mg total) by mouth every 4 (four) hours as needed for severe pain Continue current therapy. Max Daily Amount: 30 mg, Disp: 20 tablet, Rfl: 0    QUEtiapine (SEROquel) 400 MG tablet, Quetiapine Fumarate 400m tablet at night, Disp: 30 tablet, Rfl: 1    rosuvastatin (CRESTOR) 20 MG tablet, Take 1 tablet (20 mg total) by mouth every evening, Disp: 90 tablet, Rfl: 3    traMADol (Ultram) 50 mg tablet, Take 1 tablet (50 mg total) by mouth every 12 (twelve) hours as needed for moderate pain, Disp: 60 tablet, Rfl: 0  Allergies   Allergen Reactions    Molds & Smuts Nasal Congestion    Bee Pollen Nasal Congestion     Other reaction(s): Nasal Congestion    Pollen Extract Nasal Congestion       Labs:     Chemistry        Component Value Date/Time     2017 0758    K 4.4 2024 0419    K 4.7 2017 0758     (H) 2024 0419     2017 075    CO2 22 2024 041    CO2 28 2017 0758    BUN 30 (H) 2024 041    BUN 36 (H) 2017 075    CREATININE 2.44 (H) 2024    CREATININE 1.86 (H) 2017        Component Value Date/Time    CALCIUM 9.2 2024 0419    CALCIUM 9.6 2017 0758    CALCIUM 9.6 2017 0758    ALKPHOS 242 (H) 2024 0801    AST 16  "01/23/2024 0801    ALT 13 01/23/2024 0801            No results found for: \"CHOL\"  Lab Results   Component Value Date    HDL 48 (L) 08/31/2022    HDL 66 07/30/2022    HDL 66 12/16/2021     Lab Results   Component Value Date    LDLCALC 143 (H) 08/31/2022    LDLCALC 145 (H) 07/30/2022    LDLCALC 132 (H) 12/16/2021     Lab Results   Component Value Date    TRIG 358 (H) 08/31/2022    TRIG 149 07/30/2022    TRIG 143 12/16/2021     No results found for: \"CHOLHDL\"    Imaging: XR knee 3 vw left non injury    Result Date: 4/9/2024  Narrative: LEFT KNEE INDICATION:   Aftercare following joint replacement surgery. Presence of left artificial knee joint. COMPARISON:  None. VIEWS:  XR KNEE 3 VW LEFT NON INJURY Images: 3 FINDINGS: Left total knee arthroplasty in place. There is no periprosthetic fracture or lucency. There is no dislocation. There is a small effusion. No soft tissue abnormality seen.     Impression: No hardware failure about the left total knee arthroplasty Electronically signed: 04/09/2024 07:02 PM Kun Richardson MD    XR knee 1 or 2 vw left    Result Date: 4/8/2024  Narrative: XR KNEE 1 OR 2 VW LEFT INDICATION: L TKR 2/5/24; fall 2 days ago, L knee pain. COMPARISON: 03/19/2024 FINDINGS: No acute fracture or dislocation. Unremarkable total left knee arthroplasty. No joint effusion. No significant degenerative changes. No lytic or blastic osseous lesion. Unremarkable soft tissues.     Impression: Total knee arthroplasty without evidence of a hardware complication or failure. Workstation performed: [id number]     XR knee 3 vw left non injury    Result Date: 3/19/2024  Narrative: LEFT KNEE INDICATION:   Presence of left artificial knee joint. Repeated falls. Left lower quadrant pain. COMPARISON: 3/15/2024 VIEWS:  XR KNEE 3 VW LEFT NON INJURY Images: 3 FINDINGS: There is no acute fracture or dislocation. There is no joint effusion. Unremarkable appearance of total knee arthroplasty. No evidence of hardware " complication. No lytic or blastic osseous lesion. Soft tissues are unremarkable.     Impression: Unremarkable appearance of total knee arthroplasty. Electronically signed: 03/19/2024 07:40 PM Tito Cai MPH,MD    XR hip/pelv 2-3 vws left if performed    Result Date: 3/19/2024  Narrative: LEFT HIP INDICATION:   Presence of left artificial knee joint. Repeated falls. Left lower quadrant pain. COMPARISON:  None. VIEWS:  XR HIP/PELV 2-3 VWS LEFT  W PELVIS IF PERFORMED Images: 3 FINDINGS: There is no acute fracture or dislocation. No significant hip degenerative changes. No lytic or blastic osseous lesion. Soft tissues are unremarkable. The visualized lumbar spine is unremarkable.     Impression: No acute osseous abnormality. Electronically signed: 03/19/2024 06:47 PM Tito Cai MPH,MD      EKG: .    Review of Systems   Constitutional: Negative.   HENT: Negative.     Eyes: Negative.    Cardiovascular: Negative.    Respiratory: Negative.     Endocrine: Negative.    Hematologic/Lymphatic: Negative.    Skin: Negative.    Musculoskeletal: Negative.    Gastrointestinal: Negative.    Genitourinary: Negative.    Neurological: Negative.    Psychiatric/Behavioral: Negative.     Allergic/Immunologic: Negative.        Vitals:    04/15/24 1526   BP: 144/74   Pulse: 88   SpO2: 96%           Physical Exam  Vitals and nursing note reviewed.   Constitutional:       Appearance: Normal appearance.   HENT:      Head: Normocephalic.      Nose: Nose normal.      Mouth/Throat:      Mouth: Mucous membranes are moist.   Eyes:      General: No scleral icterus.     Conjunctiva/sclera: Conjunctivae normal.   Cardiovascular:      Rate and Rhythm: Normal rate and regular rhythm.      Heart sounds: Murmur heard.      No gallop.   Pulmonary:      Effort: Pulmonary effort is normal. No respiratory distress.      Breath sounds: Normal breath sounds. No wheezing or rales.   Abdominal:      General: Abdomen is flat. Bowel sounds are normal. There  is no distension.      Palpations: Abdomen is soft.      Tenderness: There is no abdominal tenderness. There is no guarding.   Musculoskeletal:      Cervical back: Normal range of motion and neck supple.      Right lower leg: No edema.      Left lower leg: No edema.   Skin:     General: Skin is warm and dry.   Neurological:      General: No focal deficit present.      Mental Status: She is alert and oriented to person, place, and time.   Psychiatric:         Mood and Affect: Mood normal.         Behavior: Behavior normal.         Discussion/Summary:      Hypertension: Bp is borderline up today. She will check her BP at home and reach out if still high.       Aortic Stenosis: Recheck echocardiogram in September. Moderate on last echocardiogram.    Continue with rosuvastatin. She has a lipid panel ordered.     The patient was counseled regarding diagnostic results, instructions for management, risk factor reductions, impressions. total time of encounter was 25 minutes and 15 minutes was spent counseling.

## 2024-04-15 NOTE — TELEPHONE ENCOUNTER
Left VM for patient that 6/6 appointment for Dr. Hare needs to be rescheduled. Instructed patient to call back at [phone]

## 2024-04-16 ENCOUNTER — OFFICE VISIT (OUTPATIENT)
Dept: PHYSICAL THERAPY | Facility: CLINIC | Age: 64
End: 2024-04-16
Payer: MEDICARE

## 2024-04-16 DIAGNOSIS — M17.12 PRIMARY OSTEOARTHRITIS OF LEFT KNEE: Primary | ICD-10-CM

## 2024-04-16 PROCEDURE — 97110 THERAPEUTIC EXERCISES: CPT

## 2024-04-16 PROCEDURE — 97112 NEUROMUSCULAR REEDUCATION: CPT

## 2024-04-16 PROCEDURE — 97140 MANUAL THERAPY 1/> REGIONS: CPT

## 2024-04-16 NOTE — PROGRESS NOTES
"Daily Note     Today's date: 2024  Patient name: Ayleen Moralez  : 1960  MRN: [id number]  Referring provider: Yari Wallace PA-C  Dx:   Encounter Diagnosis     ICD-10-CM    1. Primary osteoarthritis of left knee  M17.12                      Subjective: Pt reports both her knees are a little achey.  States she expects she  should be sore if she is more active.  Reports no falls since LV.      Objective: See treatment diary below      Assessment: Tolerated treatment well w/ progression of ex's and reps. Patient demonstrated fatigue post treatment and would benefit from continued PT for cont'd LE strengthening.  Pt c/   LE mm fatigue post.    Plan: Continue per plan of care.  Progress treatment as tolerated.       L TKA 2024    EPOC: 2024      Manuals         L Knee PROM  JK JK        Gentle L tibiofemoral mobs           Patella PROM  JK JK                   Neuro Re-Ed           L quad set 5\" x 25 \" x 25 20x5\"        Heel slide (knee flexion f/b hip abd/add) x25 30 30        Side stepping   OTB 3 laps                   SLR iso 2x10  20 3x10        SLR hip abd   10x2 3\"        SAQ iso X30 5\" X30 5\" np        LAQ iso X30 2lb X30 2lb X30 2lb        Reverse squat slant  Mini 20 x5\" 20x5\"        bridge  20x5\" 20x5\"        Ther Ex           HR 30 30 Slt bd 20        TR 30 30 Slt bd 20        Slant board gastroc stretch 10\"x10 was having some pain np 1'        Clam shell supine   10x3\"        Standing hip ext & abd X30 2lb GTB 10xea OTB 15x e a        Step ups fwd   6\" 15        Steps up lat   6\" 15        L Knee TERT with heat Into EXT 8'          Ther Activity                      LE Bike for improved L knee ROM 6' 6' 7'        Pt education: pathoanatomy, nature of sxs, POC, HEP 2'          Gait Training           SPC                       Modalities           ICE Prn           Heat Prn                    "

## 2024-04-18 ENCOUNTER — OFFICE VISIT (OUTPATIENT)
Dept: PHYSICAL THERAPY | Facility: CLINIC | Age: 64
End: 2024-04-18
Payer: MEDICARE

## 2024-04-18 ENCOUNTER — OFFICE VISIT (OUTPATIENT)
Dept: FAMILY MEDICINE CLINIC | Facility: HOSPITAL | Age: 64
End: 2024-04-18
Payer: MEDICARE

## 2024-04-18 VITALS
WEIGHT: 206 LBS | DIASTOLIC BLOOD PRESSURE: 78 MMHG | SYSTOLIC BLOOD PRESSURE: 138 MMHG | OXYGEN SATURATION: 98 % | HEIGHT: 64 IN | BODY MASS INDEX: 35.17 KG/M2 | HEART RATE: 90 BPM

## 2024-04-18 DIAGNOSIS — F11.20 CONTINUOUS OPIOID DEPENDENCE (HCC): ICD-10-CM

## 2024-04-18 DIAGNOSIS — N18.4 CKD (CHRONIC KIDNEY DISEASE) STAGE 4, GFR 15-29 ML/MIN (HCC): ICD-10-CM

## 2024-04-18 DIAGNOSIS — Z00.00 MEDICARE ANNUAL WELLNESS VISIT, SUBSEQUENT: Primary | ICD-10-CM

## 2024-04-18 DIAGNOSIS — J84.9 ILD (INTERSTITIAL LUNG DISEASE) (HCC): ICD-10-CM

## 2024-04-18 DIAGNOSIS — F31.4 SEVERE DEPRESSED BIPOLAR I DISORDER WITHOUT PSYCHOTIC FEATURES (HCC): ICD-10-CM

## 2024-04-18 DIAGNOSIS — I77.0 AVF (ARTERIOVENOUS FISTULA) (HCC): ICD-10-CM

## 2024-04-18 DIAGNOSIS — J45.40 MODERATE PERSISTENT ASTHMA WITHOUT COMPLICATION: ICD-10-CM

## 2024-04-18 DIAGNOSIS — F31.2 BIPOLAR I DISORDER, CURRENT OR MOST RECENT EPISODE MANIC, SEVERE WITH MOOD-INCONGRUENT PSYCHOTIC FEATURES (HCC): ICD-10-CM

## 2024-04-18 DIAGNOSIS — K86.1 IDIOPATHIC CHRONIC PANCREATITIS (HCC): ICD-10-CM

## 2024-04-18 DIAGNOSIS — M17.12 PRIMARY OSTEOARTHRITIS OF LEFT KNEE: Primary | ICD-10-CM

## 2024-04-18 DIAGNOSIS — I26.99 PULMONARY EMBOLISM WITH INFARCTION (HCC): ICD-10-CM

## 2024-04-18 DIAGNOSIS — I63.9 INFARCTION OF LEFT BASAL GANGLIA (HCC): ICD-10-CM

## 2024-04-18 DIAGNOSIS — E22.1 HYPERPROLACTINEMIA (HCC): ICD-10-CM

## 2024-04-18 DIAGNOSIS — N25.81 SECONDARY RENAL HYPERPARATHYROIDISM (HCC): ICD-10-CM

## 2024-04-18 PROCEDURE — 97112 NEUROMUSCULAR REEDUCATION: CPT | Performed by: PHYSICAL THERAPIST

## 2024-04-18 PROCEDURE — 97110 THERAPEUTIC EXERCISES: CPT | Performed by: PHYSICAL THERAPIST

## 2024-04-18 PROCEDURE — G0439 PPPS, SUBSEQ VISIT: HCPCS | Performed by: INTERNAL MEDICINE

## 2024-04-18 NOTE — ASSESSMENT & PLAN NOTE
On symbicort and ventolin inhalers with Sr. Ananya  Had been on different meds in rehab and now feels she is doing better.   Now with spring allergies does get tight at times

## 2024-04-18 NOTE — PROGRESS NOTES
"Daily Note     Today's date: 2024  Patient name: Ayleen Moralez  : 1960  MRN: [id number]  Referring provider: Yari Wallace PA-C  Dx:   Encounter Diagnosis     ICD-10-CM    1. Primary osteoarthritis of left knee  M17.12                      Subjective: Compliant with HEP, no questions regarding POC, motivated to continue PT      Objective: See treatment diary below      Assessment: Tolerated treatment well with treatment program as noted below requiring verbal and tactile cues from PT for safe execution of therapeutic exercise, however pt's full, pain-free L knee mobility & stability remain limited at this time, secondary to L TKA, contributing to functional deficits.     Plan: Continue per plan of care.  Progress treatment as tolerated.       L TKA 2024    EPOC: 2024      Manuals         L Knee PROM  JK JK        Gentle L tibiofemoral mobs           Patella PROM  JK JK                   Neuro Re-Ed           L quad set 5\" x 25 \" x 25 20x5\"        Heel slide (knee flexion f/b hip abd/add) x25 30 30        Side stepping   OTB 3 laps                   SLR iso 2x10  20 3x10        SLR hip abd   10x2 3\"        SAQ iso X30 5\" X30 5\" np        LAQ iso X30 2lb X30 2lb X30 2lb        Reverse squat slant  Mini 20 x5\" 20x5\"        bridge  20x5\" 20x5\"        Ther Ex           HR 30 30 Slt bd 20        TR 30 30 Slt bd 20        Slant board gastroc stretch 10\"x10 was having some pain np 1'        Clam shell supine   10x3\"        Standing hip ext & abd X30 2lb GTB 10xea OTB 15x e a        Step ups fwd   6\" 15        Steps up lat   6\" 15        L Knee TERT with heat Into EXT 8'          Ther Activity                      LE Bike for improved L knee ROM 6' 6' 7'        Pt education: pathoanatomy, nature of sxs, POC, HEP 2'          Gait Training           SPC                       Modalities           ICE Prn           Heat Prn                    "

## 2024-04-18 NOTE — PROGRESS NOTES
Assessment and Plan:     Problem List Items Addressed This Visit        Cardiovascular and Mediastinum    AVF (arteriovenous fistula) (HCC)    Pulmonary embolism with infarction (HCC)       Respiratory    Moderate persistent asthma without complication     On symbicort and ventolin inhalers with Sr. Ananya  Had been on different meds in rehab and now feels she is doing better.   Now with spring allergies does get tight at times         ILD (interstitial lung disease) (HCC)     Has appt with Pulmonary in July            Digestive    Idiopathic chronic pancreatitis (HCC)     No recent abdominal pain  Seen by Dr. Olsen            Endocrine    Hyperprolactinemia ([facility])     Seeing  renal          Secondary renal hyperparathyroidism (HCC)     Has shunt- seeing Dr. Mills   Has been stable  readings            Nervous and Auditory    Infarction of left basal ganglia (HCC)     No right sided weakness   Using walker due to recent knee surgery            Genitourinary    CKD (chronic kidney disease) stage 4, GFR 15-29 ml/min ([facility])     Lab Results   Component Value Date    EGFR 20 02/07/2024    EGFR 21 02/06/2024    EGFR 19 02/05/2024    CREATININE 2.44 (H) 02/07/2024    CREATININE 2.33 (H) 02/06/2024    CREATININE 2.55 (H) 02/05/2024   Stable creatinine            Behavioral Health    Continuous opioid dependence (HCC)     On tramadol regularly and prn oxycodone         Severe depressed bipolar I disorder without psychotic features (HCC)     Stable on current meds- Dr. Reid - [facility] and has tyree as counselor         Bipolar I disorder, current or most recent episode manic, severe with mood-incongruent psychotic features (HCC)     Continue same meds        Other Visit Diagnoses     Medicare annual wellness visit, subsequent    -  Primary           Preventive health issues were discussed with patient, and age appropriate screening tests were ordered as noted in patient's After Visit Summary.  Personalized  health advice and appropriate referrals for health education or preventive services given if needed, as noted in patient's After Visit Summary.     History of Present Illness:     Patient presents for a Medicare Wellness Visit    HPI   Patient Care Team:  Bette Harp DO as PCP - General (Internal Medicine)  Zahra Sandra MD as PCP - Endocrinology (Endocrinology)  DO Arturo Jay DO Wei-Shen Lin, MD W Terence Reilly, MD as Endoscopist  Selene Ma MD (Vascular Surgery)  Arturo Mills DO (Nephrology)  Denise Clayton MD (Nephrology)  Rina Bell, RN as Nurse Navigator (Orthopedics)     Review of Systems:     Review of Systems     Problem List:     Patient Active Problem List   Diagnosis   • Hypercholesteremia   • Spondylolisthesis at L5-S1 level   • Renal cyst   • Vitamin D deficiency   • Secondary renal hyperparathyroidism (HCC)   • Herniated nucleus pulposus, L5-S1   • Idiopathic chronic pancreatitis ([facility])   • Primary osteoarthritis of right knee   • Age-related osteoporosis without current pathological fracture   • Cardiac murmur   • Rectal prolapse   • Elevated LFTs   • Primary hypertension   • Hyperprolactinemia ([facility])   • Obsessive compulsive disorder   • Panic disorder with agoraphobia   • Eating disorder   • Hyperparathyroidism ([facility])   • Benign neoplasm of colon   • Drug-induced constipation   • Infarction of left basal ganglia ([facility])   • Abnormal chest CT   • Hyperphosphatemia   • Abnormal thyroid exam   • Thyroid nodule   • Moderate persistent asthma without complication   • Primary osteoarthritis of left knee   • Steal syndrome dialysis vascular access ([facility])   • AVF (arteriovenous fistula) ([facility])   • Right patellofemoral syndrome   • CKD (chronic kidney disease) stage 4, GFR 15-29 ml/min ([facility])   • Bilateral sacroiliitis ([facility])   • Hallux valgus, right   • Acquired hallux interphalangeus of right foot   • Effusion of right knee   • Left knee tendonitis   • Family history of cardiac disorder  in father   • Anxiety   • Claw toe, acquired, right   • Injury of plantar plate, right, initial encounter   • Acquired hallux interphalangeus, right   • Closed Colles' fracture of right radius   • Aftercare following surgery of the musculoskeletal system   • Acute shoulder pain   • Essential hypertension   • Nausea   • GERD (gastroesophageal reflux disease)   • End stage renal disease ([facility])   • Injury of right thumb   • Pain of right upper extremity   • Continuous opioid dependence ([facility])   • Severe depressed bipolar I disorder without psychotic features ([facility])   • Mixed obsessional thoughts and acts   • Eating disorder, unspecified   • Chronic back pain   • Pes anserinus bursitis of both knees   • Umbilical hernia   • Shortness of breath   • Leg swelling   • Obesity (BMI 30-39.9)   • Concussion without loss of consciousness   • Pulmonary embolism with infarction ([facility])   • ILD (interstitial lung disease) ([facility])   • Statin intolerance   • Moderate aortic stenosis   • Bipolar I disorder, current or most recent episode manic, severe with mood-incongruent psychotic features ([facility])   • Generalized anxiety disorder   • Primary localized osteoarthritis of knees, bilateral   • Acute pain of left knee   • Aftercare following left knee joint replacement surgery      Past Medical and Surgical History:     Past Medical History:   Diagnosis Date   • Anxiety    • Anxiety disorder    • Arthritis    • At risk for falls    • Bipolar 2 disorder ([facility])    • Chronic back pain    • Chronic kidney disease    • Closed fracture of distal end of right fibula with routine healing 11/04/2020   • COVID-19     in Jan 2021   • CVA (cerebral vascular accident) ([facility])     noted on MRI in the past   • Depression    • GERD (gastroesophageal reflux disease)    • Hypercholesteremia    • Hypernatremia    • Hypertension    • Hypokalemia    • Idiopathic chronic pancreatitis ([facility]) 03/20/2018   • Intervertebral disc disorder with radiculopathy of lumbosacral  region     resolved: 2015   • Kidney disease    • Kidney disease    • Limb alert care status     LUE-fistula   • Panic attacks    • Pericardial effusion    • PONV (postoperative nausea and vomiting)    • Psychiatric problem    • Radiculitis     resolved: 2015   • Secondary renal hyperparathyroidism (HCC)    • Stroke (HCC)    • Vitamin D deficiency      Past Surgical History:   Procedure Laterality Date   • BUNIONECTOMY      Left foot    • CAST APPLICATION Right 2022    Procedure: Application short-arm thumb spica splint;  Surgeon: Lyndon Victoria MD;  Location: [facility];  Service: Orthopedics   • COLON SURGERY     • COLONOSCOPY  2021   • DILATION AND CURETTAGE OF UTERUS     • INDUCED       surgically induced   • KS ARTERIOVENOUS ANASTOMOSIS OPEN DIRECT Left 2019    Procedure: CREATION FISTULA ARTERIOVENOUS (AV) left wrists possible left upper;  Surgeon: Andrey Quintero MD;  [facility]: [facility];  Service: Vascular   • KS ARTHRP KNE CONDYLE&PLATU MEDIAL&LAT COMPARTMENTS Left 2024    Procedure: ARTHROPLASTY KNEE TOTAL SAME DAY;  Surgeon: Riki Ma MD;  Location: [facility];  Service: Orthopedics   • KS CORRJ HLX VLGS BNCTY SESMDC DSTL METAR OSTEOT Left 2019    Procedure: Serge bunionectomy;  Surgeon: Munir Larkin DPM;  [facility];  Service: Podiatry   • KS CORRJ HLX VLGS BNCTY SESMDC DSTL METAR OSTEOT Right 8/3/2020    Procedure: BUNIONECTOMY RAFAEL;  Surgeon: Munir Larkin DPM;  Location: [facility];  Service: Podiatry   • KS CORRJ HLX VLGS BNCTY SESMDC PROX PHLX OSTEOT Right 2021    Procedure: BUNIONECTOMY TAMEKA, right tameka osteotomy and 2nd claw toe correction;  Surgeon: James R Lachman, MD;  Location: [facility];  Service: Orthopedics   • KS ERCP DX COLLECTION SPECIMEN BRUSHING/WASHING N/A 2018    Procedure: ENDOSCOPIC RETROGRADE CHOLANGIOPANCREATOGRAPHY (ERCP);  Surgeon: Alfredo Messina MD;  [facility];  Service:  Gastroenterology   • IA LAPAROSCOPY PROCTOPEXY PROLAPSE N/A 7/13/2018    Procedure: ROBOTIC SIGMOID RESECTION / RECTOPEXY;  Surgeon: ELPIDIO Mcnulty MD;  [facility];  Service: Colorectal   • IA OPEN TREATMENT RADIAL SHAFT FRACTURE Right 10/11/2021    Procedure: OPEN REDUCTION W/ INTERNAL FIXATION (ORIF) RADIUS (WRIST), RIGHT DISTAL;  Surgeon: Riki Ma MD;  Location: [facility];  Service: Orthopedics   • IA REMOVAL IMPLANT DEEP Right 6/23/2022    Procedure: Removal of hardware volar aspect right distal radius (distal radial plate and screws);  Surgeon: Lyndon Victoria MD;  [facility]: [facility];  Service: Orthopedics   • IA SIGMOIDOSCOPY FLX DX W/COLLJ SPEC BR/WA IF PFRMD N/A 7/13/2018    Procedure: SIGMOIDOSCOPY FLEXIBLE;  Surgeon: ELPIDIO Mcnulty MD;  [facility];  Service: Colorectal   • IA TR TDN RESTORE INTRNSC FUNCJ ALL 4 FNGRS Right 6/23/2022    Procedure: Right ring finger flexor digitorum superficialis to flexor pollicis longus tendon transfer;  Surgeon: Lyndon Victoria MD;  Location: [facility];  Service: Orthopedics   • TUBAL LIGATION Bilateral 1997   • US GUIDED THYROID BIOPSY  7/30/2019      Family History:     Family History   Problem Relation Age of Onset   • Bipolar disorder Mother    • Mental illness Mother         depression   • Stroke Mother    • Dementia Mother    • Colon polyps Mother    • Heart disease Father    • Hypertension Father    • Diabetes Father    • Other Family         Back disorder   • Diabetes Family    • Heart disease Family    • Hypertension Family    • Stroke Family    • Thyroid disease Family    • Breast cancer Paternal Grandmother         age unknown   • Breast cancer Paternal Aunt         age unknown   • Breast cancer Maternal Aunt         age unknown   • Mental illness Sister    • Colon polyps Sister    • Mental illness Sister    • Heart disease Sister    • No Known Problems Sister    • Breast cancer Sister 68   • Other Son         pituitary  tumor   • Hypertension Son    • Obesity Son    • No Known Problems Son    • No Known Problems Maternal Grandmother    • No Known Problems Maternal Grandfather    • No Known Problems Paternal Grandfather    • Breast cancer Paternal Aunt         age unknown   • Substance Abuse Neg Hx         neg fam hx   • Colon cancer Neg Hx       Social History:     Social History     Socioeconomic History   • Marital status:      Spouse name: None   • Number of children: None   • Years of education: None   • Highest education level: None   Occupational History   • 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   Tobacco Use   • Smoking status: Never   • Smokeless tobacco: Never   Vaping Use   • Vaping status: Never Used   Substance and Sexual Activity   • Alcohol use: Not Currently   • Drug use: Not Currently     Types: Hydrocodone, Marijuana     Comment: MEDICATION   Ayleen has h/o marijuana abuse- not currently   • Sexual activity: Not Currently   Other Topics Concern   • None   Social History Narrative    Daily caffeine consumption 2-3 servings a day    Lives with family.    No living will.     Has dentures---no dental care.     Primary language--English.     Feels safe at home.     Social Determinants of Health     Financial Resource Strain: Medium Risk (12/7/2022)    Overall Financial Resource Strain (CARDIA)    • Difficulty of Paying Living Expenses: Somewhat hard   Food Insecurity: No Food Insecurity (4/18/2024)    Hunger Vital Sign    • Worried About Running Out of Food in the Last Year: Never true    • Ran Out of Food in the Last Year: Never true   Transportation Needs: No Transportation Needs (4/18/2024)    PRAPARE - Transportation    • Lack of Transportation (Medical): No    • Lack of Transportation (Non-Medical): No   Physical Activity: Inactive (4/19/2021)    Exercise Vital Sign    • Days of Exercise per Week: 0 days    • Minutes of Exercise per Session: 0 min   Stress: Stress Concern Present (4/19/2021)    [employer]  [employer] - Occupational Stress Questionnaire    • Feeling of Stress : To some extent   Social Connections: Not on file   Intimate Partner Violence: Not At Risk (4/19/2021)    Humiliation, Afraid, Rape, and Kick questionnaire    • Fear of Current or Ex-Partner: No    • Emotionally Abused: No    • Physically Abused: No    • Sexually Abused: No   Housing Stability: High Risk (4/18/2024)    Housing Stability Vital Sign    • Unable to Pay for Housing in the Last Year: No    • Number of Places Lived in the Last Year: 1    • Unstable Housing in the Last Year: Yes      Medications and Allergies:     Current Outpatient Medications   Medication Sig Dispense Refill   • acetaminophen (TYLENOL) 500 mg tablet Take 2 tablets (1,000 mg total) by mouth every 8 (eight) hours 60 tablet 0   • albuterol (Ventolin HFA) 90 mcg/act inhaler Inhale 2 puffs every 6 (six) hours as needed for wheezing or shortness of breath 8.5 g 3   • alendronate (Fosamax) 70 mg tablet Take 1 tablet (70 mg total) by mouth every 7 days 4 tablet 5   • AMILoride 5 mg tablet Take 1 tablet (5 mg total) by mouth 2 (two) times a day (Patient taking differently: Take 5 mg by mouth 2 (two) times a day) 180 tablet 3   • amLODIPine (NORVASC) 5 mg tablet Take 1 tablet (5 mg total) by mouth daily 90 tablet 3   • ascorbic acid (VITAMIN C) 500 MG tablet Take 1 tablet (500 mg total) by mouth 2 (two) times a day 60 tablet 2   • aspirin 81 mg chewable tablet Chew 1 tablet (81 mg total) 2 (two) times a day 60 tablet 0   • budesonide-formoterol (Symbicort) 160-4.5 mcg/act inhaler Inhale 2 puffs 2 (two) times a day Rinse mouth after use. 10.2 g 11   • carvedilol (COREG) 25 mg tablet Take 1 tablet (25 mg total) by mouth 2 (two) times a day with meals 180 tablet 3   • cholecalciferol (VITAMIN D3) 1,000 units tablet Take 5 tablets (5,000 Units total) by mouth daily 90 tablet 5   • clonazePAM (KlonoPIN) 0.5 mg tablet Take 1 tablet (0.5 mg total) by mouth 3 (three) times a  day 270 tablet 0   • Cyanocobalamin (VITAMIN B 12 PO) Take 1,000 mcg by mouth in the morning     • ferrous sulfate 324 (65 Fe) mg Take 1 tablet (324 mg total) by mouth 2 (two) times a day before meals 60 tablet 2   • fluvoxaMINE (LUVOX) 100 mg tablet Fluvoxamine 100m tablet in the morning ; 2 tablets at night (Patient taking differently: Fluvoxamine 300 mg  in the morning ;) 270 tablet 1   • haloperidol (HALDOL) 10 mg tablet Haldoperidol 10m tablet in the morning and 1 tablet at night 60 tablet 2   • haloperidol (HALDOL) 2 mg tablet Haloperidol 2m tablet po daily in the afternoon, 1 tablet PRN 60 tablet 2   • lamoTRIgine (LaMICtal) 200 MG tablet Lamotrigine 200m tablet in the morning and 1 tablet at night 60 tablet 1   • Multiple Vitamin (MULTI-VITAMIN) tablet Take 1 tablet by mouth daily 30 tablet 2   • omeprazole (PriLOSEC) 40 MG capsule Take 1 capsule (40 mg total) by mouth daily before breakfast 90 capsule 1   • ondansetron (ZOFRAN) 4 mg tablet Take 1 tablet (4 mg total) by mouth every 8 (eight) hours as needed for nausea or vomiting 30 tablet 0   • oxyCODONE (Roxicodone) 5 immediate release tablet Take 1 tablet (5 mg total) by mouth every 4 (four) hours as needed for severe pain Continue current therapy. Max Daily Amount: 30 mg 20 tablet 0   • QUEtiapine (SEROquel) 400 MG tablet Quetiapine Fumarate 400m tablet at night 30 tablet 1   • rosuvastatin (CRESTOR) 20 MG tablet Take 1 tablet (20 mg total) by mouth every evening 90 tablet 3   • traMADol (Ultram) 50 mg tablet Take 1 tablet (50 mg total) by mouth every 12 (twelve) hours as needed for moderate pain 60 tablet 0   • folic acid (FOLVITE) 1 mg tablet Take 1 tablet (1 mg total) by mouth daily (Patient not taking: Reported on 2024) 30 tablet 2     No current facility-administered medications for this visit.     Allergies   Allergen Reactions   • Molds & Smuts Nasal Congestion   • Bee Pollen Nasal Congestion     Other reaction(s): Nasal  Congestion   • Pollen Extract Nasal Congestion      Immunizations:     Immunization History   Administered Date(s) Administered   • COVID-19 PFIZER VACCINE 0.3 ML IM 05/05/2021, 05/29/2021, 01/21/2022   • Influenza, recombinant, quadrivalent,injectable, preservative free 01/02/2019, 10/02/2019, 01/03/2022, 10/10/2022   • Pneumococcal Conjugate 13-Valent 08/14/2019   • Pneumococcal Polysaccharide PPV23 01/31/2022   • Tdap 08/14/2019      Health Maintenance:         Topic Date Due   • Breast Cancer Screening: Mammogram  01/03/2025   • Cervical Cancer Screening  07/21/2026   • Colorectal Cancer Screening  03/22/2031   • HIV Screening  Completed   • Hepatitis C Screening  Completed         Topic Date Due   • Influenza Vaccine (1) 09/01/2023   • COVID-19 Vaccine (4 - 2023-24 season) 09/01/2023      Medicare Screening Tests and Risk Assessments:     Ayleen is here for her Subsequent Wellness visit.     Health Risk Assessment:   Patient rates overall health as good. Patient feels that their physical health rating is same. Patient is satisfied with their life. Eyesight was rated as much worse. Hearing was rated as same. Patient feels that their emotional and mental health rating is same. Patients states they are sometimes angry. Patient states they are sometimes unusually tired/fatigued. Pain experienced in the last 7 days has been a lot. Patient's pain rating has been 7/10. Patient states that she has experienced no weight loss or gain in last 6 months.     Depression Screening:   PHQ-2 Score: 2      Fall Risk Screening:   In the past year, patient has experienced: history of falling in past year    Number of falls: 2 or more  Injured during fall?: No    Feels unsteady when standing or walking?: Yes    Worried about falling?: Yes      Urinary Incontinence Screening:   Patient has not leaked urine accidently in the last six months.     Home Safety:  Patient has trouble with stairs inside or outside of their home. Patient  has working smoke alarms and has working carbon monoxide detector. Home safety hazards include: none.     Nutrition:   Current diet is Regular.     Medications:   Patient is currently taking over-the-counter supplements. OTC medications include: see medication list. Patient is able to manage medications.     Activities of Daily Living (ADLs)/Instrumental Activities of Daily Living (IADLs):   Walk and transfer into and out of bed and chair?: Yes  Dress and groom yourself?: Yes    Bathe or shower yourself?: Yes    Feed yourself? Yes  Do your laundry/housekeeping?: Yes  Manage your money, pay your bills and track your expenses?: Yes  Make your own meals?: Yes      Previous Hospitalizations:   Any hospitalizations or ED visits within the last 12 months?: Yes    How many hospitalizations have you had in the last year?: more than 4    PREVENTIVE SCREENINGS      Cardiovascular Screening:    General: Screening Not Indicated and History Lipid Disorder      Diabetes Screening:     General: Screening Current      Colorectal Cancer Screening:     General: Screening Current      Breast Cancer Screening:     General: Screening Current      Cervical Cancer Screening:    General: Screening Current      Osteoporosis Screening:    General: Screening Not Indicated and History Osteoporosis      Lung Cancer Screening:     General: Screening Not Indicated      Hepatitis C Screening:    General: Screening Current    Screening, Brief Intervention, and Referral to Treatment (SBIRT)    Screening      AUDIT-C Screenin) How often did you have a drink containing alcohol in the past year? never  2) How many drinks did you have on a typical day when you were drinking in the past year? 0  3) How often did you have 6 or more drinks on one occasion in the past year? never    AUDIT-C Score: 0  Interpretation: Score 0-2 (female): Negative screen for alcohol misuse    Single Item Drug Screening:  How often have you used an illegal drug (including  "marijuana) or a prescription medication for non-medical reasons in the past year? never    Single Item Drug Screen Score: 0  Interpretation: Negative screen for possible drug use disorder    Review of Current Opioid Use    Opioid Risk Tool (ORT) Interpretation: Complete Opioid Risk Tool (ORT)    No results found.     Physical Exam:     /78   Pulse 90   Ht 5' 4\" (1.626 m)   Wt 93.4 kg (206 lb)   LMP  (LMP Unknown)   SpO2 98%   BMI 35.36 kg/m²     Physical Exam  Vitals and nursing note reviewed.   Constitutional:       General: She is not in acute distress.  HENT:      Nose: No congestion.      Mouth/Throat:      Pharynx: No posterior oropharyngeal erythema.   Pulmonary:      Breath sounds: Wheezing present.   Abdominal:      Tenderness: There is no abdominal tenderness.      Hernia: No hernia is present.   Musculoskeletal:         General: Swelling and tenderness present.      Comments: Left knee incision healing well- residual swelling          Bette Fly, DO  "

## 2024-04-18 NOTE — PATIENT INSTRUCTIONS
Give acp form  Medicare Preventive Visit Patient Instructions  Thank you for completing your Welcome to Medicare Visit or Medicare Annual Wellness Visit today. Your next wellness visit will be due in one year (4/19/2025).  The screening/preventive services that you may require over the next 5-10 years are detailed below. Some tests may not apply to you based off risk factors and/or age. Screening tests ordered at today's visit but not completed yet may show as past due. Also, please note that scanned in results may not display below.  Preventive Screenings:  Service Recommendations Previous Testing/Comments   Colorectal Cancer Screening  * Colonoscopy    * Fecal Occult Blood Test (FOBT)/Fecal Immunochemical Test (FIT)  * Fecal DNA/Cologuard Test  * Flexible Sigmoidoscopy Age: 45-75 years old   Colonoscopy: every 10 years (may be performed more frequently if at higher risk)  OR  FOBT/FIT: every 1 year  OR  Cologuard: every 3 years  OR  Sigmoidoscopy: every 5 years  Screening may be recommended earlier than age 45 if at higher risk for colorectal cancer. Also, an individualized decision between you and your healthcare provider will decide whether screening between the ages of 76-85 would be appropriate. Colonoscopy: 03/22/2021  FOBT/FIT: Not on file  Cologuard: Not on file  Sigmoidoscopy: 07/13/2018    Screening Current     Breast Cancer Screening Age: 40+ years old  Frequency: every 1-2 years  Not required if history of left and right mastectomy Mammogram: 01/03/2024    Screening Current   Cervical Cancer Screening Between the ages of 21-29, pap smear recommended once every 3 years.   Between the ages of 30-65, can perform pap smear with HPV co-testing every 5 years.   Recommendations may differ for women with a history of total hysterectomy, cervical cancer, or abnormal pap smears in past. Pap Smear: 07/21/2021    Screening Current   Hepatitis C Screening Once for adults born between 1945 and 1965  More frequently  in patients at high risk for Hepatitis C Hep C Antibody: 03/29/2019    Screening Current   Diabetes Screening 1-2 times per year if you're at risk for diabetes or have pre-diabetes Fasting glucose: 129 mg/dL (1/23/2024)  A1C: 6.6 % (1/23/2024)  Screening Current   Cholesterol Screening Once every 5 years if you don't have a lipid disorder. May order more often based on risk factors. Lipid panel: 07/05/2023    Screening Not Indicated  History Lipid Disorder     Other Preventive Screenings Covered by Medicare:  Abdominal Aortic Aneurysm (AAA) Screening: covered once if your at risk. You're considered to be at risk if you have a family history of AAA.  Lung Cancer Screening: covers low dose CT scan once per year if you meet all of the following conditions: (1) Age 55-77; (2) No signs or symptoms of lung cancer; (3) Current smoker or have quit smoking within the last 15 years; (4) You have a tobacco smoking history of at least 20 pack years (packs per day multiplied by number of years you smoked); (5) You get a written order from a healthcare provider.  Glaucoma Screening: covered annually if you're considered high risk: (1) You have diabetes OR (2) Family history of glaucoma OR (3)  aged 50 and older OR (4)  American aged 65 and older  Osteoporosis Screening: covered every 2 years if you meet one of the following conditions: (1) You're estrogen deficient and at risk for osteoporosis based off medical history and other findings; (2) Have a vertebral abnormality; (3) On glucocorticoid therapy for more than 3 months; (4) Have primary hyperparathyroidism; (5) On osteoporosis medications and need to assess response to drug therapy.   Last bone density test (DXA Scan): 12/27/2023.  HIV Screening: covered annually if you're between the age of 15-65. Also covered annually if you are younger than 15 and older than 65 with risk factors for HIV infection. For pregnant patients, it is covered up to 3 times  per pregnancy.    Immunizations:  Immunization Recommendations   Influenza Vaccine Annual influenza vaccination during flu season is recommended for all persons aged >= 6 months who do not have contraindications   Pneumococcal Vaccine   * Pneumococcal conjugate vaccine = PCV13 (Prevnar 13), PCV15 (Vaxneuvance), PCV20 (Prevnar 20)  * Pneumococcal polysaccharide vaccine = PPSV23 (Pneumovax) Adults 19-65 yo with certain risk factors or if 65+ yo  If never received any pneumonia vaccine: recommend Prevnar 20 (PCV20)  Give PCV20 if previously received 1 dose of PCV13 or PPSV23   Hepatitis B Vaccine 3 dose series if at intermediate or high risk (ex: diabetes, end stage renal disease, liver disease)   Respiratory syncytial virus (RSV) Vaccine - COVERED BY MEDICARE PART D  * RSVPreF3 (Arexvy) CDC recommends that adults 60 years of age and older may receive a single dose of RSV vaccine using shared clinical decision-making (SCDM)   Tetanus (Td) Vaccine - COST NOT COVERED BY MEDICARE PART B Following completion of primary series, a booster dose should be given every 10 years to maintain immunity against tetanus. Td may also be given as tetanus wound prophylaxis.   Tdap Vaccine - COST NOT COVERED BY MEDICARE PART B Recommended at least once for all adults. For pregnant patients, recommended with each pregnancy.   Shingles Vaccine (Shingrix) - COST NOT COVERED BY MEDICARE PART B  2 shot series recommended in those 19 years and older who have or will have weakened immune systems or those 50 years and older     Health Maintenance Due:      Topic Date Due    Breast Cancer Screening: Mammogram  01/03/2025    Cervical Cancer Screening  07/21/2026    Colorectal Cancer Screening  03/22/2031    HIV Screening  Completed    Hepatitis C Screening  Completed     Immunizations Due:      Topic Date Due    Influenza Vaccine (1) 09/01/2023    COVID-19 Vaccine (4 - 2023-24 season) 09/01/2023     Advance Directives   What are advance directives?   Advance directives are legal documents that state your wishes and plans for medical care. These plans are made ahead of time in case you lose your ability to make decisions for yourself. Advance directives can apply to any medical decision, such as the treatments you want, and if you want to donate organs.   What are the types of advance directives?  There are many types of advance directives, and each state has rules about how to use them. You may choose a combination of any of the following:  Living will:  This is a written record of the treatment you want. You can also choose which treatments you do not want, which to limit, and which to stop at a certain time. This includes surgery, medicine, IV fluid, and tube feedings.   Durable power of  for healthcare (DPAHC):  This is a written record that states who you want to make healthcare choices for you when you are unable to make them for yourself. This person, called a proxy, is usually a family member or a friend. You may choose more than 1 proxy.  Do not resuscitate (DNR) order:  A DNR order is used in case your heart stops beating or you stop breathing. It is a request not to have certain forms of treatment, such as CPR. A DNR order may be included in other types of advance directives.  Medical directive:  This covers the care that you want if you are in a coma, near death, or unable to make decisions for yourself. You can list the treatments you want for each condition. Treatment may include pain medicine, surgery, blood transfusions, dialysis, IV or tube feedings, and a ventilator (breathing machine).  Values history:  This document has questions about your views, beliefs, and how you feel and think about life. This information can help others choose the care that you would choose.  Why are advance directives important?  An advance directive helps you control your care. Although spoken wishes may be used, it is better to have your wishes written down.  Spoken wishes can be misunderstood, or not followed. Treatments may be given even if you do not want them. An advance directive may make it easier for your family to make difficult choices about your care.   Weight Management   Why it is important to manage your weight:  Being overweight increases your risk of health conditions such as heart disease, high blood pressure, type 2 diabetes, and certain types of cancer. It can also increase your risk for osteoarthritis, sleep apnea, and other respiratory problems. Aim for a slow, steady weight loss. Even a small amount of weight loss can lower your risk of health problems.  How to lose weight safely:  A safe and healthy way to lose weight is to eat fewer calories and get regular exercise. You can lose up about 1 pound a week by decreasing the number of calories you eat by 500 calories each day.   Healthy meal plan for weight management:  A healthy meal plan includes a variety of foods, contains fewer calories, and helps you stay healthy. A healthy meal plan includes the following:  Eat whole-grain foods more often.  A healthy meal plan should contain fiber. Fiber is the part of grains, fruits, and vegetables that is not broken down by your body. Whole-grain foods are healthy and provide extra fiber in your diet. Some examples of whole-grain foods are whole-wheat breads and pastas, oatmeal, brown rice, and bulgur.  Eat a variety of vegetables every day.  Include dark, leafy greens such as spinach, kale, lea greens, and mustard greens. Eat yellow and orange vegetables such as carrots, sweet potatoes, and winter squash.   Eat a variety of fruits every day.  Choose fresh or canned fruit (canned in its own juice or light syrup) instead of juice. Fruit juice has very little or no fiber.  Eat low-fat dairy foods.  Drink fat-free (skim) milk or 1% milk. Eat fat-free yogurt and low-fat cottage cheese. Try low-fat cheeses such as mozzarella and other reduced-fat  cheeses.  Choose meat and other protein foods that are low in fat.  Choose beans or other legumes such as split peas or lentils. Choose fish, skinless poultry (chicken or turkey), or lean cuts of red meat (beef or pork). Before you cook meat or poultry, cut off any visible fat.   Use less fat and oil.  Try baking foods instead of frying them. Add less fat, such as margarine, sour cream, regular salad dressing and mayonnaise to foods. Eat fewer high-fat foods. Some examples of high-fat foods include french fries, doughnuts, ice cream, and cakes.  Eat fewer sweets.  Limit foods and drinks that are high in sugar. This includes candy, cookies, regular soda, and sweetened drinks.  Exercise:  Exercise at least 30 minutes per day on most days of the week. Some examples of exercise include walking, biking, dancing, and swimming. You can also fit in more physical activity by taking the stairs instead of the elevator or parking farther away from stores. Ask your healthcare provider about the best exercise plan for you.   Narcotic (Opioid) Safety    Use narcotics safely:  Take prescribed narcotics exactly as directed  Do not give narcotics to others or take narcotics that belong to someone else  Do not mix narcotics without medicines or alcohol  Do not drive or operate heavy machinery after you take the narcotic  Monitor for side effects and notify your healthcare provider if you experienced side effects such as nausea, sleepiness, itching, or trouble thinking clearly.    Manage constipation:    Constipation is the most common side effect of narcotic medicine. Constipation is when you have hard, dry bowel movements, or you go longer than usual between bowel movements. Tell your healthcare provider about all changes in your bowel movements while you are taking narcotics. He or she may recommend laxative medicine to help you have a bowel movement. He or she may also change the kind of narcotic you are taking, or change when you  take it. The following are more ways you can prevent or relieve constipation:    Drink liquids as directed.  You may need to drink extra liquids to help soften and move your bowels. Ask how much liquid to drink each day and which liquids are best for you.  Eat high-fiber foods.  This may help decrease constipation by adding bulk to your bowel movements. High-fiber foods include fruits, vegetables, whole-grain breads and cereals, and beans. Your healthcare provider or dietitian can help you create a high-fiber meal plan. Your provider may also recommend a fiber supplement if you cannot get enough fiber from food.  Exercise regularly.  Regular physical activity can help stimulate your intestines. Walking is a good exercise to prevent or relieve constipation. Ask which exercises are best for you.  Schedule a time each day to have a bowel movement.  This may help train your body to have regular bowel movements. Bend forward while you are on the toilet to help move the bowel movement out. Sit on the toilet for at least 10 minutes, even if you do not have a bowel movement.    Store narcotics safely:   Store narcotics where others cannot easily get them.  Keep them in a locked cabinet or secure area. Do not  keep them in a purse or other bag you carry with you. A person may be looking for something else and find the narcotics.  Make sure narcotics are stored out of the reach of children.  A child can easily overdose on narcotics. Narcotics may look like candy to a small child.    The best way to dispose of narcotics:      The laws vary by country and area. In the [place], the best way is to return the narcotics through a take-back program. This program is offered by the [place] Drug Enforcement Agency (ALICIA). The following are options for using the program:  Take the narcotics to a ALICIA collection site.  The site is often a law enforcement center. Call your local law enforcement center for scheduled take-back days in your  area. You will be given information on where to go if the collection site is in a different location.  Take the narcotics to an approved pharmacy or hospital.  A pharmacy or hospital may be set up as a collection site. You will need to ask if it is a ALICIA collection site if you were not directed there. A pharmacy or doctor's office may not be able to take back narcotics unless it is a ALICIA site.  Use a mail-back system.  This means you are given containers to put the narcotics into. You will then mail them in the containers.  Use a take-back drop box.  This is a place to leave the narcotics at any time. People and animals will not be able to get into the box. Your local law enforcement agency can tell you where to find a drop box in your area.    Other ways to manage pain:   Ask your healthcare provider about non-narcotic medicines to control pain.  Nonprescription medicines include NSAIDs (such as ibuprofen) and acetaminophen. Prescription medicines include muscle relaxers, antidepressants, and steroids.  Pain may be managed without any medicines.  Some ways to relieve pain include massage, aromatherapy, or meditation. Physical or occupational therapy may also help.    For more information:   Drug Enforcement Administration  [address] [place] [address]  Phone: 4- 520 - 601-4404  Web Address: https://www.deadiversion.[facility].gov/drug_disposal/    US Food and Drug Administration  [address] [place] [address]  Phone: 0- 173 - 600-3731  Web Address: http://www.fda.gov     © Copyright [employer] 2018 Information is for End User's use only and may not be sold, redistributed or otherwise used for commercial purposes. All illustrations and images included in CareNotes® are the copyrighted property of [employer] or [employer]

## 2024-04-18 NOTE — ASSESSMENT & PLAN NOTE
Lab Results   Component Value Date    EGFR 20 02/07/2024    EGFR 21 02/06/2024    EGFR 19 02/05/2024    CREATININE 2.44 (H) 02/07/2024    CREATININE 2.33 (H) 02/06/2024    CREATININE 2.55 (H) 02/05/2024   Stable creatinine

## 2024-04-22 DIAGNOSIS — I10 PRIMARY HYPERTENSION: ICD-10-CM

## 2024-04-22 RX ORDER — AMLODIPINE BESYLATE 5 MG/1
5 TABLET ORAL DAILY
Qty: 90 TABLET | Refills: 3 | Status: SHIPPED | OUTPATIENT
Start: 2024-04-22 | End: 2024-05-07 | Stop reason: SDUPTHER

## 2024-04-23 ENCOUNTER — OFFICE VISIT (OUTPATIENT)
Dept: PHYSICAL THERAPY | Facility: CLINIC | Age: 64
End: 2024-04-23
Payer: MEDICARE

## 2024-04-23 DIAGNOSIS — M17.12 PRIMARY OSTEOARTHRITIS OF LEFT KNEE: Primary | ICD-10-CM

## 2024-04-23 PROCEDURE — 97530 THERAPEUTIC ACTIVITIES: CPT | Performed by: PHYSICAL THERAPIST

## 2024-04-23 PROCEDURE — 97112 NEUROMUSCULAR REEDUCATION: CPT | Performed by: PHYSICAL THERAPIST

## 2024-04-23 PROCEDURE — 97110 THERAPEUTIC EXERCISES: CPT | Performed by: PHYSICAL THERAPIST

## 2024-04-23 NOTE — PROGRESS NOTES
"Daily Note     Today's date: 2024  Patient name: Ayleen Moralez  : 1960  MRN: [id number]  Referring provider: Yari Wallace PA-C  Dx:   Encounter Diagnosis     ICD-10-CM    1. Primary osteoarthritis of left knee  M17.12                      Subjective: Compliant with HEP, no questions regarding POC, motivated to continue PT      Objective: See treatment diary below      Assessment: Tolerated treatment well with treatment program as noted below requiring verbal and tactile cues from PT for safe execution of therapeutic exercise, however pt's full, pain-free L knee mobility & stability remain limited at this time, secondary to L TKA, contributing to functional deficits.     Plan: Continue per plan of care.  Progress treatment as tolerated.       L TKA 2024    EPOC: 2024      Manuals         L Knee PROM  JK JK        Gentle L tibiofemoral mobs           Patella PROM  JK JK                   Neuro Re-Ed           L quad set 5\" x 25 \" x 25 20x5\"        Heel slide (knee flexion f/b hip abd/add) x25 30 30        Side stepping   OTB 3 laps                   SLR iso 2x10  20 3x10        SLR hip abd   10x2 3\"        SAQ iso X30 5\" 2lb X30 5\" np        LAQ iso X30 2lb X30 2lb X30 2lb        Reverse squat slant  Mini 20 x5\" 20x5\"        bridge  20x5\" 20x5\"        Ther Ex           HR 30 30 Slt bd 20        TR 30 30 Slt bd 20        Slant board gastroc stretch 10\"x10 was having some pain np 1'        Clam shell supine x30  10x3\"        Standing hip ext & abd X30 2lb GTB 10xea OTB 15x e a        Step ups fwd X20 6\"  6\" 15        Steps up lat   6\" 15        L Knee TERT with heat Into EXT 8'          Ther Activity                      LE Bike for improved L knee ROM 6' L1 6' 7'        Pt education: pathoanatomy, nature of sxs, POC, HEP 2'          Gait Training           SPC                       Modalities           ICE Prn           Heat Prn                    "

## 2024-04-25 ENCOUNTER — APPOINTMENT (OUTPATIENT)
Dept: PHYSICAL THERAPY | Facility: CLINIC | Age: 64
End: 2024-04-25
Payer: MEDICARE

## 2024-04-26 ENCOUNTER — OFFICE VISIT (OUTPATIENT)
Dept: PHYSICAL THERAPY | Facility: CLINIC | Age: 64
End: 2024-04-26
Payer: MEDICARE

## 2024-04-26 DIAGNOSIS — R11.0 NAUSEA: ICD-10-CM

## 2024-04-26 DIAGNOSIS — M17.12 PRIMARY OSTEOARTHRITIS OF LEFT KNEE: Primary | ICD-10-CM

## 2024-04-26 DIAGNOSIS — M81.0 AGE-RELATED OSTEOPOROSIS WITHOUT CURRENT PATHOLOGICAL FRACTURE: ICD-10-CM

## 2024-04-26 PROCEDURE — 97110 THERAPEUTIC EXERCISES: CPT

## 2024-04-26 PROCEDURE — 97112 NEUROMUSCULAR REEDUCATION: CPT

## 2024-04-26 PROCEDURE — 97140 MANUAL THERAPY 1/> REGIONS: CPT

## 2024-04-27 RX ORDER — ONDANSETRON 4 MG/1
4 TABLET, FILM COATED ORAL EVERY 8 HOURS PRN
Qty: 30 TABLET | Refills: 1 | Status: SHIPPED | OUTPATIENT
Start: 2024-04-27

## 2024-04-28 ENCOUNTER — TELEPHONE (OUTPATIENT)
Age: 64
End: 2024-04-28

## 2024-04-28 NOTE — TELEPHONE ENCOUNTER
PA for ondansetron    Submitted via    []CMM-KEY    [x]Surescripts-Case ID # [id number]   []Faxed to plan   []Other website    []Phone call Case ID #      Office notes sent, clinical questions answered. Awaiting determination    Turnaround time for your insurance to make a decision on your Prior Authorization can take 7-21 business days.

## 2024-04-29 RX ORDER — ALENDRONATE SODIUM 70 MG/1
70 TABLET ORAL
Qty: 4 TABLET | Refills: 5 | Status: SHIPPED | OUTPATIENT
Start: 2024-04-29

## 2024-04-30 ENCOUNTER — APPOINTMENT (OUTPATIENT)
Dept: PHYSICAL THERAPY | Facility: CLINIC | Age: 64
End: 2024-04-30
Payer: MEDICARE

## 2024-04-30 DIAGNOSIS — M79.605 BILATERAL LEG PAIN: ICD-10-CM

## 2024-04-30 DIAGNOSIS — M79.604 BILATERAL LEG PAIN: ICD-10-CM

## 2024-04-30 DIAGNOSIS — M54.50 LUMBAR PAIN: ICD-10-CM

## 2024-04-30 NOTE — TELEPHONE ENCOUNTER
Patient would like to appeal the denial she states that she has been taking this medication and its for nausea so she is not understanding why it was denied. Please advise

## 2024-04-30 NOTE — TELEPHONE ENCOUNTER
PA for ondansetron Denied    Reason:(Screenshot if applicable)        Message sent to office clinical pool Yes    Denial letter scanned into Media Yes    Appeal started No ( Provider will need to decide if appeal is warranted and send clinical documentation to PA team for initiation.)

## 2024-05-02 ENCOUNTER — TELEPHONE (OUTPATIENT)
Age: 64
End: 2024-05-02

## 2024-05-02 NOTE — TELEPHONE ENCOUNTER
Pt called stating she has a f/u appt scheduled for August with PCP.  Pt would like lab orders placed so she can have them drawn prior to her August appt.  Pt also states she wants an A1c lab order in as her results were elevated last time.  Please review and advise how pt should proceed.

## 2024-05-03 DIAGNOSIS — E66.01 OBESITY, MORBID (HCC): ICD-10-CM

## 2024-05-03 DIAGNOSIS — N18.4 CKD (CHRONIC KIDNEY DISEASE) STAGE 4, GFR 15-29 ML/MIN (HCC): ICD-10-CM

## 2024-05-03 DIAGNOSIS — E55.9 VITAMIN D DEFICIENCY: ICD-10-CM

## 2024-05-03 DIAGNOSIS — I10 PRIMARY HYPERTENSION: Primary | ICD-10-CM

## 2024-05-03 DIAGNOSIS — E21.3 HYPERPARATHYROIDISM (HCC): ICD-10-CM

## 2024-05-03 DIAGNOSIS — E66.9 OBESITY (BMI 30-39.9): ICD-10-CM

## 2024-05-03 DIAGNOSIS — I10 ESSENTIAL HYPERTENSION: ICD-10-CM

## 2024-05-03 DIAGNOSIS — R73.01 IMPAIRED FASTING GLUCOSE: ICD-10-CM

## 2024-05-03 RX ORDER — TRAMADOL HYDROCHLORIDE 50 MG/1
50 TABLET ORAL EVERY 12 HOURS PRN
Qty: 60 TABLET | Refills: 0 | Status: SHIPPED | OUTPATIENT
Start: 2024-05-03

## 2024-05-03 NOTE — TELEPHONE ENCOUNTER
Pt called back asking what the previous call was regarding. Told pt it was regarding upcoming labs. Told pt that the lab orders are in her chart and she can go whenever and they will require fasting for at least 10-12 hours.     Pt agreed and had no further questions.

## 2024-05-03 NOTE — TELEPHONE ENCOUNTER
Left message on Pt's am - labs are in [facility] and they are fasting.    Also her appointment with Dr. Harp is in October not August

## 2024-05-06 ENCOUNTER — APPOINTMENT (OUTPATIENT)
Dept: LAB | Facility: HOSPITAL | Age: 64
End: 2024-05-06
Payer: MEDICARE

## 2024-05-06 DIAGNOSIS — R73.01 IMPAIRED FASTING GLUCOSE: ICD-10-CM

## 2024-05-06 DIAGNOSIS — I10 ESSENTIAL HYPERTENSION: ICD-10-CM

## 2024-05-06 DIAGNOSIS — E66.9 OBESITY (BMI 30-39.9): ICD-10-CM

## 2024-05-06 DIAGNOSIS — E55.9 VITAMIN D DEFICIENCY: ICD-10-CM

## 2024-05-06 LAB
25(OH)D3 SERPL-MCNC: 87 NG/ML (ref 30–100)
ALBUMIN SERPL BCP-MCNC: 4.2 G/DL (ref 3.5–5)
ALP SERPL-CCNC: 176 U/L (ref 34–104)
ALT SERPL W P-5'-P-CCNC: 14 U/L (ref 7–52)
ANION GAP SERPL CALCULATED.3IONS-SCNC: 8 MMOL/L (ref 4–13)
AST SERPL W P-5'-P-CCNC: 16 U/L (ref 13–39)
BASOPHILS # BLD AUTO: 0.08 THOUSANDS/ÂΜL (ref 0–0.1)
BASOPHILS NFR BLD AUTO: 1 % (ref 0–1)
BILIRUB SERPL-MCNC: 0.24 MG/DL (ref 0.2–1)
BUN SERPL-MCNC: 35 MG/DL (ref 5–25)
CALCIUM SERPL-MCNC: 10 MG/DL (ref 8.4–10.2)
CHLORIDE SERPL-SCNC: 112 MMOL/L (ref 96–108)
CHOLEST SERPL-MCNC: 195 MG/DL
CO2 SERPL-SCNC: 25 MMOL/L (ref 21–32)
CREAT SERPL-MCNC: 2.39 MG/DL (ref 0.6–1.3)
EOSINOPHIL # BLD AUTO: 0.21 THOUSAND/ÂΜL (ref 0–0.61)
EOSINOPHIL NFR BLD AUTO: 3 % (ref 0–6)
ERYTHROCYTE [DISTWIDTH] IN BLOOD BY AUTOMATED COUNT: 13.2 % (ref 11.6–15.1)
EST. AVERAGE GLUCOSE BLD GHB EST-MCNC: 137 MG/DL
GFR SERPL CREATININE-BSD FRML MDRD: 21 ML/MIN/1.73SQ M
GLUCOSE SERPL-MCNC: 103 MG/DL (ref 65–140)
HBA1C MFR BLD: 6.4 %
HCT VFR BLD AUTO: 38.1 % (ref 34.8–46.1)
HDLC SERPL-MCNC: 49 MG/DL
HGB BLD-MCNC: 11.6 G/DL (ref 11.5–15.4)
IMM GRANULOCYTES # BLD AUTO: 0.04 THOUSAND/UL (ref 0–0.2)
IMM GRANULOCYTES NFR BLD AUTO: 1 % (ref 0–2)
LDLC SERPL CALC-MCNC: 104 MG/DL (ref 0–100)
LYMPHOCYTES # BLD AUTO: 1.44 THOUSANDS/ÂΜL (ref 0.6–4.47)
LYMPHOCYTES NFR BLD AUTO: 20 % (ref 14–44)
MCH RBC QN AUTO: 31.6 PG (ref 26.8–34.3)
MCHC RBC AUTO-ENTMCNC: 30.4 G/DL (ref 31.4–37.4)
MCV RBC AUTO: 104 FL (ref 82–98)
MONOCYTES # BLD AUTO: 0.6 THOUSAND/ÂΜL (ref 0.17–1.22)
MONOCYTES NFR BLD AUTO: 9 % (ref 4–12)
NEUTROPHILS # BLD AUTO: 4.71 THOUSANDS/ÂΜL (ref 1.85–7.62)
NEUTS SEG NFR BLD AUTO: 66 % (ref 43–75)
NRBC BLD AUTO-RTO: 0 /100 WBCS
PLATELET # BLD AUTO: 225 THOUSANDS/UL (ref 149–390)
PMV BLD AUTO: 10.8 FL (ref 8.9–12.7)
POTASSIUM SERPL-SCNC: 4.9 MMOL/L (ref 3.5–5.3)
PROT SERPL-MCNC: 7.1 G/DL (ref 6.4–8.4)
RBC # BLD AUTO: 3.67 MILLION/UL (ref 3.81–5.12)
SODIUM SERPL-SCNC: 145 MMOL/L (ref 135–147)
TRIGL SERPL-MCNC: 211 MG/DL
WBC # BLD AUTO: 7.08 THOUSAND/UL (ref 4.31–10.16)

## 2024-05-06 PROCEDURE — 80061 LIPID PANEL: CPT

## 2024-05-06 PROCEDURE — 82306 VITAMIN D 25 HYDROXY: CPT

## 2024-05-06 PROCEDURE — 80053 COMPREHEN METABOLIC PANEL: CPT

## 2024-05-06 PROCEDURE — 85025 COMPLETE CBC W/AUTO DIFF WBC: CPT

## 2024-05-06 PROCEDURE — 36415 COLL VENOUS BLD VENIPUNCTURE: CPT

## 2024-05-06 PROCEDURE — 83036 HEMOGLOBIN GLYCOSYLATED A1C: CPT

## 2024-05-07 ENCOUNTER — SOCIAL WORK (OUTPATIENT)
Dept: BEHAVIORAL/MENTAL HEALTH CLINIC | Facility: CLINIC | Age: 64
End: 2024-05-07
Payer: MEDICARE

## 2024-05-07 DIAGNOSIS — F40.01 PANIC DISORDER WITH AGORAPHOBIA: Primary | ICD-10-CM

## 2024-05-07 DIAGNOSIS — I10 PRIMARY HYPERTENSION: ICD-10-CM

## 2024-05-07 DIAGNOSIS — F31.4 SEVERE DEPRESSED BIPOLAR I DISORDER WITHOUT PSYCHOTIC FEATURES (HCC): ICD-10-CM

## 2024-05-07 DIAGNOSIS — F41.1 GENERALIZED ANXIETY DISORDER: ICD-10-CM

## 2024-05-07 PROCEDURE — 90834 PSYTX W PT 45 MINUTES: CPT | Performed by: COUNSELOR

## 2024-05-07 RX ORDER — AMLODIPINE BESYLATE 5 MG/1
5 TABLET ORAL DAILY
Qty: 90 TABLET | Refills: 3 | Status: SHIPPED | OUTPATIENT
Start: 2024-05-07

## 2024-05-07 NOTE — BH CRISIS PLAN
Client Name: Ayleen Moralez       Client YOB: 1960    Jennifer Safety Plan      Creation Date: 5/7/24 Update Date: 5/7/25   Created By: Nancy Mallory LCSW Last Updated By: Nancy Mallory LCSW      Step 1: Warning Signs:   Warning Signs   More anxious   Become more obsessive compulsive   Withdrawn            Step 2: Internal Coping Strategies:   Internal Coping Strategies   Listen to music            Step 3: People and social settings that provide distraction:   Name Contact Information   Sister Sherie GRAY            Step 4: People whom I can ask for help during a crisis:      Name Contact Information    See above       Step 5: Professionals or agencies I can contact during a crisis:      Clinican/Agency Name Phone Emergency Contact    [facility] [phone] Crisis      Local Emergency Department Emergency Department Phone Emergency Department Address    [place] [address] [address]        Crisis Phone Numbers:   Suicide Prevention Lifeline: Call or Text  984 Crisis Text Line: Text HOME to 259-087   Please note: Some [facility] do not have a separate number for Child/Adolescent specific crisis. If your county is not listed under Child/Adolescent, please call the adult number for your county      Adult Crisis Numbers: Child/Adolescent Crisis Numbers   [facility]: [phone] [facility]: [phone]   [facility]: [phone] [facility]: [phone]   [facility]: [phone] [place]: [phone]   [facility]: [phone] [place]: [phone]   [place]: [phone]   [facility]: [phone]   [facility]: [phone]   [facility]: [phone] (daytime) [phone] (after hours, weekends, holidays)      Step 6: Making the environment safer (plan for lethal means safety):   Patient did not identify any lethal methods: Yes     Optional: What is most important to me and worth  living for?   N/A     Jennifer Safety Plan. Kaitlin Tabares and Alf Solomon. Used with permission of the authors.
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
## 2024-05-07 NOTE — TELEPHONE ENCOUNTER
Reason for call:   [x] Refill   [] Prior Auth  [x] Other: NOT A DUP! PATIENT FILLS THIS DRUG THROUGH MAIL ORDER    Office:   [x] PCP/Provider - [facility]  [] Specialty/Provider -     Medication:    Does the patient have enough for 3 days?   [] Yes   [x] No - Send as HP to POD

## 2024-05-09 ENCOUNTER — TELEPHONE (OUTPATIENT)
Age: 64
End: 2024-05-09

## 2024-05-09 DIAGNOSIS — E66.01 OBESITY, MORBID (HCC): ICD-10-CM

## 2024-05-09 DIAGNOSIS — I10 PRIMARY HYPERTENSION: ICD-10-CM

## 2024-05-09 DIAGNOSIS — R73.01 IMPAIRED FASTING GLUCOSE: Primary | ICD-10-CM

## 2024-05-09 NOTE — TELEPHONE ENCOUNTER
Spoke to patient relayed message from labs 5/6/24 from Dr. Harp.  No further questions.  Placed orders for CMP and hba1c for 4 months.

## 2024-05-13 ENCOUNTER — OFFICE VISIT (OUTPATIENT)
Dept: PSYCHIATRY | Facility: CLINIC | Age: 64
End: 2024-05-13
Payer: MEDICARE

## 2024-05-13 DIAGNOSIS — E66.9 OBESITY (BMI 30-39.9): ICD-10-CM

## 2024-05-13 DIAGNOSIS — E22.1 HYPERPROLACTINEMIA (HCC): ICD-10-CM

## 2024-05-13 DIAGNOSIS — R73.01 IMPAIRED FASTING GLUCOSE: ICD-10-CM

## 2024-05-13 DIAGNOSIS — F31.10 BIPOLAR I DISORDER, MOST RECENT EPISODE MANIC (HCC): ICD-10-CM

## 2024-05-13 DIAGNOSIS — I10 ESSENTIAL HYPERTENSION: Primary | ICD-10-CM

## 2024-05-13 DIAGNOSIS — F20.9 SCHIZOPHRENIA, UNSPECIFIED TYPE (HCC): Primary | ICD-10-CM

## 2024-05-13 PROCEDURE — 99214 OFFICE O/P EST MOD 30 MIN: CPT | Performed by: PSYCHIATRY & NEUROLOGY

## 2024-05-13 RX ORDER — QUETIAPINE FUMARATE 300 MG/1
300 TABLET, FILM COATED ORAL
COMMUNITY
End: 2024-05-13

## 2024-05-13 RX ORDER — QUETIAPINE FUMARATE 400 MG/1
TABLET, FILM COATED ORAL
Qty: 60 TABLET | Refills: 1 | Status: SHIPPED | OUTPATIENT
Start: 2024-05-13

## 2024-05-13 RX ORDER — LAMOTRIGINE 200 MG/1
TABLET ORAL
Qty: 60 TABLET | Refills: 2 | Status: SHIPPED | OUTPATIENT
Start: 2024-05-13 | End: 2024-05-20
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
## 2024-05-13 NOTE — PATIENT INSTRUCTIONS
Ayleen Moralez [id number]   Thanks for presenting to your [facility] appointment   Seroquel was increased  800mg at night  Discontinued Haldol 10mg x 2 ,and Haldol 2 mg once + PRN  Your other medications were not changed

## 2024-05-15 ENCOUNTER — APPOINTMENT (OUTPATIENT)
Dept: RADIOLOGY | Facility: CLINIC | Age: 64
End: 2024-05-15
Payer: MEDICARE

## 2024-05-15 ENCOUNTER — OFFICE VISIT (OUTPATIENT)
Dept: OBGYN CLINIC | Facility: CLINIC | Age: 64
End: 2024-05-15
Payer: MEDICARE

## 2024-05-15 VITALS
DIASTOLIC BLOOD PRESSURE: 70 MMHG | SYSTOLIC BLOOD PRESSURE: 150 MMHG | WEIGHT: 206 LBS | BODY MASS INDEX: 35.17 KG/M2 | HEIGHT: 64 IN

## 2024-05-15 DIAGNOSIS — Z96.652 AFTERCARE FOLLOWING LEFT KNEE JOINT REPLACEMENT SURGERY: ICD-10-CM

## 2024-05-15 DIAGNOSIS — Z47.1 AFTERCARE FOLLOWING LEFT KNEE JOINT REPLACEMENT SURGERY: ICD-10-CM

## 2024-05-15 DIAGNOSIS — Z96.652 AFTERCARE FOLLOWING LEFT KNEE JOINT REPLACEMENT SURGERY: Primary | ICD-10-CM

## 2024-05-15 DIAGNOSIS — Z47.1 AFTERCARE FOLLOWING LEFT KNEE JOINT REPLACEMENT SURGERY: Primary | ICD-10-CM

## 2024-05-15 PROCEDURE — 73562 X-RAY EXAM OF KNEE 3: CPT

## 2024-05-15 PROCEDURE — 99213 OFFICE O/P EST LOW 20 MIN: CPT | Performed by: ORTHOPAEDIC SURGERY

## 2024-05-15 NOTE — PROGRESS NOTES
Assessment:     1. Aftercare following left knee joint replacement surgery        Plan:     Problem List Items Addressed This Visit          Surgery/Wound/Pain    Aftercare following left knee joint replacement surgery - Primary     Ayleen is doing well status post left total knee arthroplasty on February 5, 2024.  X-rays were reviewed with her that reveals a well aligned prosthesis with no acute fracture.  Discussed importance of her continuing the home exercises taught to her by physical therapy.  She is to continue to be very careful getting around so that she does not fall.  Advised patient she is to take antibiotics prior to any dental work for the first year after surgery.  Follow-up in 9 months with repeat x-rays of the left knee.  All patient's questions were answered to her satisfaction.  This note is created using dictation transcription.  It may contain typographical errors, grammatical errors, improperly dictated words, background noise and other errors.         Relevant Orders    XR knee 3 vw left non injury      Subjective:     Patient ID: Ayleen Moralez is a 63 y.o. female.  Chief Complaint:  This is a 63-year-old white female who is status post left total knee arthroplasty on February 5, 2024.  She arrives ambulating with no assistance.  She has completed outpatient physical therapy and is doing the exercises at home.  She states she has not had any other falls since last visit.  She is being very careful.  She denies any pain in the left knee.      Allergy:  Allergies   Allergen Reactions    Molds & Smuts Nasal Congestion    Bee Pollen Nasal Congestion     Other reaction(s): Nasal Congestion    Pollen Extract Nasal Congestion     Medications:  all current active meds have been reviewed  Past Medical History:  Past Medical History:   Diagnosis Date    Anxiety     Anxiety disorder     Arthritis     At risk for falls     Bipolar 2 disorder (HCC)     Chronic back pain     Chronic kidney disease      Closed fracture of distal end of right fibula with routine healing 2020    COVID-19     in 2021    CVA (cerebral vascular accident) (HCC)     noted on MRI in the past    Depression     GERD (gastroesophageal reflux disease)     Hypercholesteremia     Hypernatremia     Hypertension     Hypokalemia     Idiopathic chronic pancreatitis (HCC) 2018    Intervertebral disc disorder with radiculopathy of lumbosacral region     resolved: 2015    Kidney disease     Kidney disease     Limb alert care status     LUE-fistula    Panic attacks     Pericardial effusion     PONV (postoperative nausea and vomiting)     Psychiatric problem     Radiculitis     resolved: 2015    Secondary renal hyperparathyroidism (HCC)     Stroke (HCC)     Vitamin D deficiency      Past Surgical History:  Past Surgical History:   Procedure Laterality Date    BUNIONECTOMY      Left foot     CAST APPLICATION Right 2022    Procedure: Application short-arm thumb spica splint;  Surgeon: Lyndon Victoria MD;  [facility];  Service: Orthopedics    COLON SURGERY      COLONOSCOPY  2021    DILATION AND CURETTAGE OF UTERUS      INDUCED       surgically induced    PA ARTERIOVENOUS ANASTOMOSIS OPEN DIRECT Left 2019    Procedure: CREATION FISTULA ARTERIOVENOUS (AV) left wrists possible left upper;  Surgeon: Andrey Quintero MD;  Location:  MAIN OR;  Service: Vascular    PA ARTHRP KNE CONDYLE&PLATU MEDIAL&LAT COMPARTMENTS Left 2024    Procedure: ARTHROPLASTY KNEE TOTAL SAME DAY;  Surgeon: Riki Ma MD;  [facility];  Service: Orthopedics    [facility]    Procedure: Serge bunionectomy;  Surgeon: Munir Larkin DPM;  [facility];  Service: Podiatry    [facility]    Procedure: BUNIONECTOMY RAFAEL;  Surgeon: Munir Larkin DPM;  [facility];  Service: Podiatry    [facility]27/2021    Procedure: BUNIONECTOMY TAMEKA, right tameka osteotomy and 2nd claw toe correction;  Surgeon: James R Lachman, MD;  [facility];  Service: Orthopedics    IN ERCP DX COLLECTION SPECIMEN BRUSHING/WASHING N/A 4/11/2018    Procedure: ENDOSCOPIC RETROGRADE CHOLANGIOPANCREATOGRAPHY (ERCP);  Surgeon: Alfredo Messina MD;  [facility];  Service: Gastroenterology    IN LAPAROSCOPY PROCTOPEXY PROLAPSE N/A 7/13/2018    Procedure: ROBOTIC SIGMOID RESECTION / RECTOPEXY;  Surgeon: ELPIDIO Mcnulty MD;  [facility];  Service: Colorectal    IN OPEN TREATMENT RADIAL SHAFT FRACTURE Right 10/11/2021    Procedure: OPEN REDUCTION W/ INTERNAL FIXATION (ORIF) RADIUS (WRIST), RIGHT DISTAL;  Surgeon: Riki Ma MD;  [facility];  Service: Orthopedics    IN REMOVAL IMPLANT DEEP Right 6/23/2022    Procedure: Removal of hardware volar aspect right distal radius (distal radial plate and screws);  Surgeon: Lyndon Victoria MD;  [facility];  Service: Orthopedics    IN SIGMOIDOSCOPY FLX DX W/COLLJ SPEC BR/WA IF PFRMD N/A 7/13/2018    Procedure: SIGMOIDOSCOPY FLEXIBLE;  Surgeon: ELPIDIO Mcnulty MD;  [facility];  Service: Colorectal    IN TR TDN RESTORE INTRNSC FUNCJ ALL 4 FNGRS Right 6/23/2022    Procedure: Right ring finger flexor digitorum superficialis to flexor pollicis longus tendon transfer;  Surgeon: Lyndon Victoria MD;  [facility];  Service: Orthopedics    TUBAL LIGATION Bilateral 1997    US GUIDED THYROID BIOPSY  7/30/2019     Family History:  Family History   Problem Relation Age of Onset    Bipolar disorder Mother     Mental illness Mother         depression    Stroke Mother     Dementia Mother     Colon polyps Mother     Heart disease Father     Hypertension Father     Diabetes Father     Other Family         Back disorder    Diabetes Family     Heart disease Family     Hypertension Family     Stroke  "Family     Thyroid disease Family     Breast cancer Paternal Grandmother         age unknown    Breast cancer Paternal Aunt         age unknown    Breast cancer Maternal Aunt         age unknown    Mental illness Sister     Colon polyps Sister     Mental illness Sister     Heart disease Sister     No Known Problems Sister     Breast cancer Sister 68    Other Son         pituitary tumor    Hypertension Son     Obesity Son     No Known Problems Son     No Known Problems Maternal Grandmother     No Known Problems Maternal Grandfather     No Known Problems Paternal Grandfather     Breast cancer Paternal Aunt         age unknown    Substance Abuse Neg Hx         neg fam hx    Colon cancer Neg Hx      Social History:  Social History     Substance and Sexual Activity   Alcohol Use Not Currently     Social History     Substance and Sexual Activity   Drug Use Not Currently    Types: Hydrocodone, Marijuana    Comment: MEDICATION   Ayleen has h/o marijuana abuse- not currently     Social History     Tobacco Use   Smoking Status Never   Smokeless Tobacco Never     Review of Systems   Constitutional: Negative.    HENT: Negative.     Eyes: Negative.    Respiratory: Negative.     Cardiovascular: Negative.    Gastrointestinal: Negative.    Endocrine: Negative.    Genitourinary: Negative.    Musculoskeletal:  Positive for joint swelling (left knee). Negative for arthralgias (left knee) and gait problem.   Skin: Negative.    Allergic/Immunologic: Negative.    Hematological: Negative.    Psychiatric/Behavioral: Negative.           Objective:  BP Readings from Last 1 Encounters:   05/15/24 150/70      Wt Readings from Last 1 Encounters:   05/15/24 93.4 kg (206 lb)      BMI:   Estimated body mass index is 35.36 kg/m² as calculated from the following:    Height as of this encounter: 5' 4\" (1.626 m).    Weight as of this encounter: 93.4 kg (206 lb).  BSA:   Estimated body surface area is 1.98 meters squared as calculated from the " "following:    Height as of this encounter: 5' 4\" (1.626 m).    Weight as of this encounter: 93.4 kg (206 lb).   Physical Exam  Constitutional:       General: She is not in acute distress.     Appearance: She is well-developed.   HENT:      Head: Normocephalic.   Eyes:      Conjunctiva/sclera: Conjunctivae normal.      Pupils: Pupils are equal, round, and reactive to light.   Pulmonary:      Effort: Pulmonary effort is normal. No respiratory distress.   Musculoskeletal:      Left knee: No effusion.   Skin:     General: Skin is warm and dry.   Neurological:      Mental Status: She is alert and oriented to person, place, and time.   Psychiatric:         Behavior: Behavior normal.       Left Knee Exam     Tenderness   The patient is experiencing no tenderness.     Range of Motion   Left knee extension: -5.   Flexion:  120     Tests   Varus: negative Valgus: negative    Other   Erythema: absent  Scars: present (Well-healed incision with no evidence of infection.)  Sensation: normal  Pulse: present  Swelling: mild  Effusion: no effusion present    Comments:  Calf soft, nontender            I have personally reviewed pertinent films in PACS and my interpretation is x-rays of left knee reveal a well aligned prosthesis with no evidence of loosening or acute fracture.    Scribe Attestation      I,:  Yari Wallace PA-C am acting as a scribe while in the presence of the attending physician.:       I,:  Riki Ma MD personally performed the services described in this documentation    as scribed in my presence.:            "

## 2024-05-15 NOTE — ASSESSMENT & PLAN NOTE
Ayleen is doing well status post left total knee arthroplasty on February 5, 2024.  X-rays were reviewed with her that reveals a well aligned prosthesis with no acute fracture.  Discussed importance of her continuing the home exercises taught to her by physical therapy.  She is to continue to be very careful getting around so that she does not fall.  Advised patient she is to take antibiotics prior to any dental work for the first year after surgery.  Follow-up in 9 months with repeat x-rays of the left knee.  All patient's questions were answered to her satisfaction.  This note is created using dictation transcription.  It may contain typographical errors, grammatical errors, improperly dictated words, background noise and other errors.

## 2024-05-20 ENCOUNTER — TELEPHONE (OUTPATIENT)
Dept: PSYCHIATRY | Facility: CLINIC | Age: 64
End: 2024-05-20

## 2024-05-20 DIAGNOSIS — F31.10 BIPOLAR I DISORDER, MOST RECENT EPISODE MANIC (HCC): Primary | ICD-10-CM

## 2024-05-20 RX ORDER — LAMOTRIGINE 200 MG/1
TABLET ORAL
Qty: 180 TABLET | Refills: 0 | Status: SHIPPED | OUTPATIENT
Start: 2024-05-20

## 2024-05-20 NOTE — TELEPHONE ENCOUNTER
Pt called said that the script for the lamotrigine 200mg needs to be sent to the [facility] instead of the mail order

## 2024-05-20 NOTE — TELEPHONE ENCOUNTER
Pt Ayleen Moralez , 1960 , SLPF chart was reviewed.   Lamotrigine 200mg qty 180 was sent to [facility]  Pharmacy    Medications Prescribed During SLPF Encounter:   -     lamoTRIgine (LaMICtal) 200 MG tablet; Lamotrigine 200m tablet in the morning , 1 tablet at night       This note was not shared with the patient due to reasonable likelihood of causing patient harm     This note may have been written with the assistance of dictation software. Please excuse any grammatical  errors, misspellings,  and abnormal spacing of letters , sentences or paragraphs . For accurate interpretation should read note horizontally

## 2024-05-28 ENCOUNTER — APPOINTMENT (OUTPATIENT)
Dept: LAB | Facility: HOSPITAL | Age: 64
End: 2024-05-28
Payer: MEDICARE

## 2024-05-28 ENCOUNTER — TELEPHONE (OUTPATIENT)
Dept: NEPHROLOGY | Facility: CLINIC | Age: 64
End: 2024-05-28

## 2024-05-28 DIAGNOSIS — N18.4 CKD (CHRONIC KIDNEY DISEASE) STAGE 4, GFR 15-29 ML/MIN (HCC): ICD-10-CM

## 2024-05-28 LAB
ALBUMIN SERPL BCP-MCNC: 4.3 G/DL (ref 3.5–5)
ALP SERPL-CCNC: 143 U/L (ref 34–104)
ALT SERPL W P-5'-P-CCNC: 15 U/L (ref 7–52)
ANION GAP SERPL CALCULATED.3IONS-SCNC: 8 MMOL/L (ref 4–13)
AST SERPL W P-5'-P-CCNC: 17 U/L (ref 13–39)
BACTERIA UR QL AUTO: ABNORMAL /HPF
BILIRUB SERPL-MCNC: 0.28 MG/DL (ref 0.2–1)
BILIRUB UR QL STRIP: NEGATIVE
BUN SERPL-MCNC: 27 MG/DL (ref 5–25)
CALCIUM SERPL-MCNC: 10 MG/DL (ref 8.4–10.2)
CHLORIDE SERPL-SCNC: 112 MMOL/L (ref 96–108)
CLARITY UR: CLEAR
CO2 SERPL-SCNC: 23 MMOL/L (ref 21–32)
COLOR UR: COLORLESS
CREAT SERPL-MCNC: 2.18 MG/DL (ref 0.6–1.3)
CREAT UR-MCNC: 47.6 MG/DL
ERYTHROCYTE [DISTWIDTH] IN BLOOD BY AUTOMATED COUNT: 13.2 % (ref 11.6–15.1)
GFR SERPL CREATININE-BSD FRML MDRD: 23 ML/MIN/1.73SQ M
GLUCOSE SERPL-MCNC: 104 MG/DL (ref 65–140)
GLUCOSE UR STRIP-MCNC: NEGATIVE MG/DL
HCT VFR BLD AUTO: 38.4 % (ref 34.8–46.1)
HGB BLD-MCNC: 11.5 G/DL (ref 11.5–15.4)
HGB UR QL STRIP.AUTO: NEGATIVE
KETONES UR STRIP-MCNC: NEGATIVE MG/DL
LEUKOCYTE ESTERASE UR QL STRIP: NEGATIVE
MAGNESIUM SERPL-MCNC: 2 MG/DL (ref 1.9–2.7)
MCH RBC QN AUTO: 30.8 PG (ref 26.8–34.3)
MCHC RBC AUTO-ENTMCNC: 29.9 G/DL (ref 31.4–37.4)
MCV RBC AUTO: 103 FL (ref 82–98)
MICROALBUMIN UR-MCNC: 33.4 MG/L
MICROALBUMIN/CREAT 24H UR: 70 MG/G CREATININE (ref 0–30)
NITRITE UR QL STRIP: NEGATIVE
NON-SQ EPI CELLS URNS QL MICRO: ABNORMAL /HPF
PH UR STRIP.AUTO: 6 [PH]
PHOSPHATE SERPL-MCNC: 3.3 MG/DL (ref 2.3–4.1)
PLATELET # BLD AUTO: 233 THOUSANDS/UL (ref 149–390)
PMV BLD AUTO: 10.9 FL (ref 8.9–12.7)
POTASSIUM SERPL-SCNC: 4.6 MMOL/L (ref 3.5–5.3)
PROT SERPL-MCNC: 7 G/DL (ref 6.4–8.4)
PROT UR STRIP-MCNC: NEGATIVE MG/DL
PTH-INTACT SERPL-MCNC: 77.1 PG/ML (ref 12–88)
RBC # BLD AUTO: 3.73 MILLION/UL (ref 3.81–5.12)
RBC #/AREA URNS AUTO: ABNORMAL /HPF
SODIUM SERPL-SCNC: 143 MMOL/L (ref 135–147)
SP GR UR STRIP.AUTO: 1.01 (ref 1–1.03)
URATE SERPL-MCNC: 5.9 MG/DL (ref 2–7.5)
UROBILINOGEN UR STRIP-ACNC: <2 MG/DL
WBC # BLD AUTO: 5.76 THOUSAND/UL (ref 4.31–10.16)
WBC #/AREA URNS AUTO: ABNORMAL /HPF

## 2024-05-28 PROCEDURE — 83970 ASSAY OF PARATHORMONE: CPT

## 2024-05-28 PROCEDURE — 82570 ASSAY OF URINE CREATININE: CPT

## 2024-05-28 PROCEDURE — 81001 URINALYSIS AUTO W/SCOPE: CPT

## 2024-05-28 PROCEDURE — 84100 ASSAY OF PHOSPHORUS: CPT

## 2024-05-28 PROCEDURE — 82043 UR ALBUMIN QUANTITATIVE: CPT

## 2024-05-28 PROCEDURE — 83735 ASSAY OF MAGNESIUM: CPT

## 2024-05-28 PROCEDURE — 85027 COMPLETE CBC AUTOMATED: CPT

## 2024-05-28 PROCEDURE — 84550 ASSAY OF BLOOD/URIC ACID: CPT

## 2024-05-28 PROCEDURE — 80053 COMPREHEN METABOLIC PANEL: CPT

## 2024-05-28 PROCEDURE — 36415 COLL VENOUS BLD VENIPUNCTURE: CPT

## 2024-05-28 NOTE — TELEPHONE ENCOUNTER
----- Message from Arturo Mills DO sent at 5/28/2024  3:11 PM EDT -----  Labs are stable.  No changes at this time

## 2024-05-28 NOTE — TELEPHONE ENCOUNTER
Called patient and went over the following information:    Labs are stable. No changes at this time.    Patient verbally understood and had no further questions for me at this time.

## 2024-05-29 ENCOUNTER — TELEPHONE (OUTPATIENT)
Age: 64
End: 2024-05-29

## 2024-05-29 ENCOUNTER — OFFICE VISIT (OUTPATIENT)
Dept: PODIATRY | Facility: CLINIC | Age: 64
End: 2024-05-29

## 2024-05-29 VITALS
WEIGHT: 202 LBS | BODY MASS INDEX: 34.49 KG/M2 | SYSTOLIC BLOOD PRESSURE: 149 MMHG | DIASTOLIC BLOOD PRESSURE: 79 MMHG | HEIGHT: 64 IN | HEART RATE: 68 BPM

## 2024-05-29 DIAGNOSIS — B35.1 ONYCHOMYCOSIS: Primary | ICD-10-CM

## 2024-05-29 PROCEDURE — NCFTCARE PR NON-COVERED FOOT CARE: Performed by: PODIATRIST

## 2024-05-29 NOTE — PROGRESS NOTES
PATIENT:  Ayleen Moralez  1960    ASSESSMENT/PLAN:  1. Onychomycosis                   The patient was educated in her diagnosis.  Instructed skin care and protection.  Nails trimmed.  She wishes to have periodic foot care and RA in 12 weeks.             HPI:  Ayleen Moralez is a 63 y.o.year old female seen for chief complaint of elongated nails. The patient denied any acute pedal disorder.      PAST MEDICAL HISTORY:  Past Medical History:   Diagnosis Date    Anxiety     Anxiety disorder     Arthritis     At risk for falls     Bipolar 2 disorder (HCC)     Chronic back pain     Chronic kidney disease     Closed fracture of distal end of right fibula with routine healing 2020    COVID-19     in 2021    CVA (cerebral vascular accident) (HCC)     noted on MRI in the past    Depression     GERD (gastroesophageal reflux disease)     Hypercholesteremia     Hypernatremia     Hypertension     Hypokalemia     Idiopathic chronic pancreatitis ([facility]) 2018    Intervertebral disc disorder with radiculopathy of lumbosacral region     resolved: 2015    Kidney disease     Kidney disease     Limb alert care status     LUE-fistula    Panic attacks     Pericardial effusion     PONV (postoperative nausea and vomiting)     Psychiatric problem     Radiculitis     resolved: 2015    Secondary renal hyperparathyroidism (HCC)     Stroke ([facility])     Vitamin D deficiency        PAST SURGICAL HISTORY:  Past Surgical History:   Procedure Laterality Date    BUNIONECTOMY      Left foot     CAST APPLICATION Right 2022    Procedure: Application short-arm thumb spica splint;  Surgeon: Lyndon Victoria MD;  Location:  MAIN OR;  Service: Orthopedics    COLON SURGERY      COLONOSCOPY  2021    DILATION AND CURETTAGE OF UTERUS      INDUCED       surgically induced    [place] ARTERIOVENOUS ANASTOMOSIS OPEN DIRECT Left 2019    Procedure: CREATION FISTULA ARTERIOVENOUS (AV) left  wrists possible left upper;  Surgeon: Andrey Quintero MD;  [facility];  Service: Vascular    AR ARTHRP KNE CONDYLE&PLATU MEDIAL&LAT COMPARTMENTS Left 2/5/2024    Procedure: ARTHROPLASTY KNEE TOTAL SAME DAY;  Surgeon: Riki Ma MD;  [facility];  Service: Orthopedics    AR CORRJ HLX VLGS [facility]CTY [facility] METAR OSTEOT Left 7/1/2019    Procedure: Serge bunionectomy;  Surgeon: Munir Larkin DPM;  [facility];  Service: Podiatry    AR CORRJ HLX VLGS BNCTY [facility] METAR OSTEOT Right 8/3/2020    Procedure: BUNIONECTOMY RAFAEL;  Surgeon: Munir Larkin DPM;  [facility];  Service: Podiatry    AR CORRJ HLX VLGS BNCTY [facility] PROX PHLX OSTEOT Right 9/27/2021    Procedure: BUNIONECTOMY TAMEKA, right tameka osteotomy and 2nd claw toe correction;  Surgeon: James R Lachman, MD;  [facility];  Service: Orthopedics    AR ERCP DX COLLECTION SPECIMEN BRUSHING/WASHING N/A 4/11/2018    Procedure: ENDOSCOPIC RETROGRADE CHOLANGIOPANCREATOGRAPHY (ERCP);  Surgeon: Alfredo Messina MD;  [facility];  Service: Gastroenterology    AR LAPAROSCOPY PROCTOPEXY PROLAPSE N/A 7/13/2018    Procedure: ROBOTIC SIGMOID RESECTION / RECTOPEXY;  Surgeon: ELPIDIO Mcnulty MD;  [facility];  Service: Colorectal    AR OPEN TREATMENT RADIAL SHAFT FRACTURE Right 10/11/2021    Procedure: OPEN REDUCTION W/ INTERNAL FIXATION (ORIF) RADIUS (WRIST), RIGHT DISTAL;  Surgeon: Riki Ma MD;  [facility];  Service: Orthopedics    AR REMOVAL IMPLANT DEEP Right 6/23/2022    Procedure: Removal of hardware volar aspect right distal radius (distal radial plate and screws);  Surgeon: Lyndon Victoria MD;  [facility];  Service: Orthopedics    AR SIGMOIDOSCOPY FLX DX W/COLLJ SPEC BR/WA IF PFRMD N/A 7/13/2018    Procedure: SIGMOIDOSCOPY FLEXIBLE;  Surgeon: ELPIDIO Mcnulty MD;  [facility];  Service: Colorectal    AR TR TDN RESTORE INTRNSC FUNCJ ALL 4 FNGRS Right 6/23/2022     Procedure: Right ring finger flexor digitorum superficialis to flexor pollicis longus tendon transfer;  Surgeon: Lyndon Victoria MD;  Location:  MAIN OR;  Service: Orthopedics    TUBAL LIGATION Bilateral 1997    US GUIDED THYROID BIOPSY  2019        ALLERGIES:  Molds & smuts, Bee pollen, and Pollen extract    MEDICATIONS:  Current Outpatient Medications   Medication Sig Dispense Refill    acetaminophen (TYLENOL) 500 mg tablet Take 2 tablets (1,000 mg total) by mouth every 8 (eight) hours 60 tablet 0    albuterol (Ventolin HFA) 90 mcg/act inhaler Inhale 2 puffs every 6 (six) hours as needed for wheezing or shortness of breath 8.5 g 3    alendronate (Fosamax) 70 mg tablet Take 1 tablet (70 mg total) by mouth every 7 days 4 tablet 5    AMILoride 5 mg tablet Take 1 tablet (5 mg total) by mouth 2 (two) times a day (Patient taking differently: Take 5 mg by mouth 2 (two) times a day) 180 tablet 3    amLODIPine (NORVASC) 5 mg tablet Take 1 tablet (5 mg total) by mouth daily 90 tablet 3    ascorbic acid (VITAMIN C) 500 MG tablet Take 1 tablet (500 mg total) by mouth 2 (two) times a day 60 tablet 2    aspirin 81 mg chewable tablet Chew 1 tablet (81 mg total) 2 (two) times a day 60 tablet 0    budesonide-formoterol (Symbicort) 160-4.5 mcg/act inhaler Inhale 2 puffs 2 (two) times a day Rinse mouth after use. 10.2 g 11    carvedilol (COREG) 25 mg tablet Take 1 tablet (25 mg total) by mouth 2 (two) times a day with meals 180 tablet 3    cholecalciferol (VITAMIN D3) 1,000 units tablet Take 5 tablets (5,000 Units total) by mouth daily 90 tablet 5    clonazePAM (KlonoPIN) 0.5 mg tablet Take 1 tablet (0.5 mg total) by mouth 3 (three) times a day 270 tablet 0    Cyanocobalamin (VITAMIN B 12 PO) Take 1,000 mcg by mouth in the morning      ferrous sulfate 324 (65 Fe) mg Take 1 tablet (324 mg total) by mouth 2 (two) times a day before meals 60 tablet 2    fluvoxaMINE (LUVOX) 100 mg tablet Fluvoxamine 100m tablet in the  morning ; 2 tablets at night (Patient taking differently: Fluvoxamine 300 mg  in the morning ;) 270 tablet 1    lamoTRIgine (LaMICtal) 200 MG tablet Lamotrigine 200m tablet in the morning , 1 tablet at night 180 tablet 0    Multiple Vitamin (MULTI-VITAMIN) tablet Take 1 tablet by mouth daily 30 tablet 2    omeprazole (PriLOSEC) 40 MG capsule Take 1 capsule (40 mg total) by mouth daily before breakfast 90 capsule 1    ondansetron (ZOFRAN) 4 mg tablet Take 1 tablet (4 mg total) by mouth every 8 (eight) hours as needed for nausea or vomiting 30 tablet 1    oxyCODONE (Roxicodone) 5 immediate release tablet Take 1 tablet (5 mg total) by mouth every 4 (four) hours as needed for severe pain Continue current therapy. Max Daily Amount: 30 mg 20 tablet 0    QUEtiapine (SEROquel) 400 MG tablet Quetiapine Fumarate 400m tablets at night 60 tablet 1    rosuvastatin (CRESTOR) 20 MG tablet Take 1 tablet (20 mg total) by mouth every evening 90 tablet 3    traMADol (Ultram) 50 mg tablet Take 1 tablet (50 mg total) by mouth every 12 (twelve) hours as needed for moderate pain 60 tablet 0    folic acid (FOLVITE) 1 mg tablet Take 1 tablet (1 mg total) by mouth daily (Patient not taking: Reported on 2024) 30 tablet 2     No current facility-administered medications for this visit.       SOCIAL HISTORY:  Social History     Socioeconomic History    Marital status:      Spouse name: None    Number of children: None    Years of education: None    Highest education level: None   Occupational History    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   Tobacco Use    Smoking status: Never    Smokeless tobacco: Never   Vaping Use    Vaping status: Never Used   Substance and Sexual Activity    Alcohol use: Not Currently    Drug use: Not Currently     Types: Hydrocodone, Marijuana     Comment: JESSE Abbasi has h/o marijuana abuse- not currently    Sexual activity: Not Currently   Other Topics Concern    None   Social History Narrative     Daily caffeine consumption 2-3 servings a day    Lives with family.    No living will.     Has dentures---no dental care.     Primary language--English.     Feels safe at home.     Social Determinants of Health     Financial Resource Strain: Medium Risk (12/7/2022)    Overall Financial Resource Strain (CARDIA)     Difficulty of Paying Living Expenses: Somewhat hard   Food Insecurity: No Food Insecurity (4/18/2024)    Hunger Vital Sign     Worried About Running Out of Food in the Last Year: Never true     Ran Out of Food in the Last Year: Never true   Transportation Needs: No Transportation Needs (4/18/2024)    PRAPARE - Transportation     Lack of Transportation (Medical): No     Lack of Transportation (Non-Medical): No   Physical Activity: Inactive (4/19/2021)    Exercise Vital Sign     Days of Exercise per Week: 0 days     Minutes of Exercise per Session: 0 min   Stress: Stress Concern Present (4/19/2021)    [employer] - Occupational Stress Questionnaire     Feeling of Stress : To some extent   Social Connections: Not on file   Intimate Partner Violence: Not At Risk (4/19/2021)    Humiliation, Afraid, Rape, and Kick questionnaire     Fear of Current or Ex-Partner: No     Emotionally Abused: No     Physically Abused: No     Sexually Abused: No   Housing Stability: High Risk (4/18/2024)    Housing Stability Vital Sign     Unable to Pay for Housing in the Last Year: No     Number of Places Lived in the Last Year: 1     Unstable Housing in the Last Year: Yes        REVIEW OF SYSTEMS:  GENERAL: No fever or chills  HEART: No chest pain, or palpitation  RESPIRATORY:  No acute SOB or cough  GI: No Nausea, vomit or diarrhea  NEUROLOGIC: No syncope or acute weakness    PHYSICAL EXAM:  VASCULAR EXAM  Pedal pulses are intact.  CRT is WNL.     NEUROLOGIC EXAM  Sensation is intact to light touch.  No focal neurologic deficit.          DERMATOLOGIC EXAM:   No ulcer or cellulitis noted.  The patient  has thick, elongated toenails.      MUSCULOSKELETAL EXAM:   No acute joint pain, edema, or redness.  No acute musculoskeletal problem.

## 2024-05-29 NOTE — TELEPHONE ENCOUNTER
Caller: Ayleen Moralez    Doctor: Dr. Stahl    Reason for call: changed appt/checked to see date last seen/2023    Call back#: NA

## 2024-05-31 ENCOUNTER — TELEPHONE (OUTPATIENT)
Dept: NEPHROLOGY | Facility: CLINIC | Age: 64
End: 2024-05-31

## 2024-06-03 ENCOUNTER — OFFICE VISIT (OUTPATIENT)
Dept: NEPHROLOGY | Facility: HOSPITAL | Age: 64
End: 2024-06-03
Payer: MEDICARE

## 2024-06-03 VITALS
SYSTOLIC BLOOD PRESSURE: 136 MMHG | HEART RATE: 84 BPM | WEIGHT: 203 LBS | OXYGEN SATURATION: 95 % | BODY MASS INDEX: 34.66 KG/M2 | HEIGHT: 64 IN | DIASTOLIC BLOOD PRESSURE: 70 MMHG

## 2024-06-03 DIAGNOSIS — E83.39 HYPERPHOSPHATEMIA: ICD-10-CM

## 2024-06-03 DIAGNOSIS — M79.605 BILATERAL LEG PAIN: ICD-10-CM

## 2024-06-03 DIAGNOSIS — E21.3 HYPERPARATHYROIDISM (HCC): ICD-10-CM

## 2024-06-03 DIAGNOSIS — M54.50 LUMBAR PAIN: ICD-10-CM

## 2024-06-03 DIAGNOSIS — M79.604 BILATERAL LEG PAIN: ICD-10-CM

## 2024-06-03 DIAGNOSIS — I10 PRIMARY HYPERTENSION: ICD-10-CM

## 2024-06-03 DIAGNOSIS — I10 ESSENTIAL HYPERTENSION: ICD-10-CM

## 2024-06-03 DIAGNOSIS — F31.81 BIPOLAR 2 DISORDER (HCC): Chronic | ICD-10-CM

## 2024-06-03 DIAGNOSIS — N18.4 CKD (CHRONIC KIDNEY DISEASE) STAGE 4, GFR 15-29 ML/MIN (HCC): Primary | ICD-10-CM

## 2024-06-03 PROBLEM — N18.6 END STAGE RENAL DISEASE (HCC): Status: RESOLVED | Noted: 2022-03-11 | Resolved: 2024-06-03

## 2024-06-03 PROCEDURE — 99214 OFFICE O/P EST MOD 30 MIN: CPT | Performed by: INTERNAL MEDICINE

## 2024-06-03 NOTE — TELEPHONE ENCOUNTER
Reason for call:   [x] Refill   [] Prior Auth  [] Other:     Office:   [x] [facility] [address]   [] Specialty/Provider -     Medication: Klonopin    Dose/Frequency: 0.5 mg     Quantity: #270    Pharmacy: [facility] [address]    Does the patient have enough for 3 days?   [] Yes   [x] No - Send as HP to POD                  Reason for call:   [x] Refill   [] Prior Auth  [] Other:     Office:   [x] [facility] [address]    [] Specialty/Provider -     Medication: Tramadol    Dose/Frequency: 50 mg tablet    Quantity: #60    Pharmacy: [facility] [address]    Does the patient have enough for 3 days?   [] Yes   [x] No - Send as HP to POD

## 2024-06-03 NOTE — PATIENT INSTRUCTIONS
Chronic Kidney Disease   WHAT YOU NEED TO KNOW:   Chronic kidney disease (CKD) is the gradual and permanent loss of kidney function. It is also called chronic kidney failure, or chronic renal insufficiency. Normally, the kidneys remove fluid, chemicals, and waste from your blood. These wastes are turned into urine by your kidneys. CKD may worsen over time and lead to kidney failure. Your CKD team will help you and your family plan for your care at home. The team will help you create goals and find ways to meet your goals. Your care plan may change over time as your needs change.       DISCHARGE INSTRUCTIONS:   Call your local emergency number (911 in the ) if:   You have a seizure.    You have shortness of breath.    Return to the emergency department if:   You are confused and very drowsy.       Call your doctor or nephrologist if:   You suddenly gain or lose more weight than your healthcare provider has told you is okay.    You have itchy skin or a rash.    You urinate more or less than you normally do.    You have blood in your urine.    You have nausea and are vomiting.    You have fatigue or muscle weakness.    You have hiccups that will not stop.    You have questions or concerns about your condition or care.    Medicines:   Medicines  may be given to decrease blood pressure and get rid of extra fluid. You may also receive medicine to manage health conditions that may occur with CKD, such as anemia, diabetes, and heart disease.    Take your medicine as directed.  Contact your healthcare provider if you think your medicine is not helping or if you have side effects. Tell your provider if you are allergic to any medicine. Keep a list of the medicines, vitamins, and herbs you take. Include the amounts, and when and why you take them. Bring the list or the pill bottles to follow-up visits. Carry your medicine list with you in case of an emergency.    What you can do to manage CKD:  Management may include making  some lifestyle changes. Tell your healthcare provider if you have any concerns about being able to make changes. He or she can help you find solutions, including working with specialists. Ask for help creating a plan to break large goals into smaller steps. Your plan may include any of the following:  Manage other health conditions.  Your healthcare provider will work with you to make a care plan that meets your needs. You will be checked regularly for heart disease or other conditions that can make CKD worse, such as diabetes. Your blood pressure will be closely monitored. You will also get a target blood pressure and help making a plan to reach your target. This may include taking your blood pressure at home.         Maintain a healthy weight.  Your weight and body mass index (BMI) will be checked regularly. BMI helps find if your weight is healthy for your height. Your healthcare provider will use other tests to check your muscle and protein levels. Extra weight can strain your kidneys. A low weight or low muscle mass can make you feel more tired. You may have trouble doing your daily activities. Ask your provider what a healthy weight is for you. He or she can help you create a plan to lose or gain weight safely, if needed. The plan may include keeping a food diary. This is a list of foods and liquids you have each day. Your provider will use the diary to help you make changes, if needed. Changes are based on your health and any other conditions you have, such as diabetes.    Create an exercise plan.  Regular exercise can help you manage CKD, high blood pressure, and diabetes. Exercise also helps control weight. Your provider can help you create exercise goals and a plan to reach those goals. For example, your goal may be to exercise for 30 minutes in a day. Your plan can include breaking exercise into 10 minute sessions, 3 times during the day.         Create a healthy eating plan.  Your provider may tell you  to eat food low in potassium, phosphorus, or protein. Your provider may also recommend vitamin or mineral supplements. Do not take any supplements without talking to your provider. A dietitian can help you plan meals if needed. Ask how much liquid to drink each day and which liquids are best for you.         Limit sodium (salt) as directed.  You may need to limit sodium to less than 2,300 milligrams (mg) each day. Ask your dietitian or healthcare provider how much sodium you can have each day. The amount depends on your stage of kidney disease. Table salt, canned foods, soups, salted snacks, and processed meats, like deli meats and sausage, are high in sodium. Your provider or a dietitian can show you how to read food labels for sodium.    Limit alcohol as directed.  Alcohol can cause fluid retention and can affect your kidneys. Ask how much alcohol is safe for you. A drink of alcohol is 12 ounces of beer, 5 ounces of wine, or 1½ ounces of liquor.    Do not smoke.  Nicotine and other chemicals in cigarettes and cigars can cause kidney damage. Ask your provider for information if you currently smoke and need help to quit. E-cigarettes or smokeless tobacco still contain nicotine. Talk to your provider before you use these products.    Ask about over-the-counter medicines.  Medicines such as NSAIDs and laxatives may harm your kidneys. Some cough and cold medicines can raise your blood pressure. Always ask if a medicine is safe before you take it.    Ask about vaccines you may need.  CKD can increase your risk for infections such as pneumonia, influenza, and hepatitis. Vaccines lower your risk for infection. Your healthcare provider will tell you which vaccines you need and when to get them.    Follow up with your doctor or nephrologist as directed:  You will need to return for tests to monitor your kidney and nerve function, and your parathyroid hormone level. Your medicines may be changed, based on certain test  results. Write down your questions so you remember to ask them during your visits.  © Copyright [employer] 2023 Information is for End User's use only and may not be sold, redistributed or otherwise used for commercial purposes.  The above information is an  only. It is not intended as medical advice for individual conditions or treatments. Talk to your doctor, nurse or pharmacist before following any medical regimen to see if it is safe and effective for you.

## 2024-06-03 NOTE — PROGRESS NOTES
OFFICE FOLLOW UP - Nephrology   Ayleen Moralez 63 y.o. female MRN: [id number]    Encounter: [id number]        ASSESSMENT and PLAN:  Diagnoses and all orders for this visit:    63-year-old female with a past medical history of bipolar disorder, stage 4 chronic kidney disease who presents for follow-up    Chronic kidney disease, stage IV  - creatinine fluctuates but seems to have some progression estimated GFR now around 20   - she is currently not on any nephrotoxic agent,  Was on lithium for several years  - renal cyst is stable in size   -she is off her potassium supplementation,   -blood pressures have been well controlled  -urine protein to creatinine ratios incrased mildly but less than 1/2 gram  -she has had echogenic kidneys that have been small in size since at least 2016  -completed CKD education  - status post AV fistula creation left radiocephalic with good bruit and thrill  - [facility] when GFR less than 20  - no urgent indicatoin for RRT but monitoring will repeat surveillance now every 4 to 6 months given her stability    Bipolar 2 disorder (HCC), eating disorder  - she was on lithium in her 20s may have some chronic interstitial nephritis associated with the she also has some mild hypernatremia  - she is currently being treated with Lamictal    Cyst of right kidney  - she has had recent MRIs and CT scan that have been done serially most recently had a CT scan in February of 2020 this is grossly stable partially exophytic nodule left lower pole  -A CT scan from march of 2022 shows unremarkable kidneys and no hydronephrosis    Secondary renal hyperparathyroidism (HCC)  - she has a PTH and been high, calcium is mildly high,  Could be lithium induced vs element of primary HyperPTH.  -Can take 1 calcium supplement    Proteinuria  - she has a positive urine protein to creatinine ratio but a negative urinalysis she is  mildly anemic with CKD.  She was on lithium several years  ago she also has had acute kidney injury from chronic lactulose  Use  -  limited serologic workup was negative last urine protein to creatinine ratio was negligible    hypertension  -she is now on carvedilol 25 mg BID,  and amiloride 5 mg twice daily-(in the setting of DI associated with chronic lithium use Amiloride was initiated)  -blood pressure is stable asked her to check her blood pressures at home  -Continue amlodipine 5 mg daily  -weight is stable-history of eating disorder in the past and follows with psychiatry    Hypernatremia  -her serum sodiums are stable  -continue amiloride 5 mg twice daily for now if potassium is rising because of worsening CKD will have to down titrate her or hold    Elevated LFTs  -LFTs are stable  -continue statin    Follow-up in 4 to 6 months    HPI: Ayleen Moralez is a 63 y.o. female who is here for Follow-up and Chronic Kidney Disease    Since her last office visit she is doing ok she feels well denies any cp, sob, fevers chills, no nausea, vomiting diarrhea or constipation  Having some knee pain. Some lightheadedness when she stands up      ROS:   All the systems were reviewed and were negative except as documented on the HPI.    Allergies: Molds & smuts, Bee pollen, and Pollen extract    Medications:   Current Outpatient Medications:     acetaminophen (TYLENOL) 500 mg tablet, Take 2 tablets (1,000 mg total) by mouth every 8 (eight) hours, Disp: 60 tablet, Rfl: 0    albuterol (Ventolin HFA) 90 mcg/act inhaler, Inhale 2 puffs every 6 (six) hours as needed for wheezing or shortness of breath, Disp: 8.5 g, Rfl: 3    alendronate (Fosamax) 70 mg tablet, Take 1 tablet (70 mg total) by mouth every 7 days, Disp: 4 tablet, Rfl: 5    AMILoride 5 mg tablet, Take 1 tablet (5 mg total) by mouth 2 (two) times a day (Patient taking differently: Take 5 mg by mouth 2 (two) times a day), Disp: 180 tablet, Rfl: 3    amLODIPine (NORVASC) 5 mg tablet, Take 1 tablet (5 mg total) by mouth  daily, Disp: 90 tablet, Rfl: 3    ascorbic acid (VITAMIN C) 500 MG tablet, Take 1 tablet (500 mg total) by mouth 2 (two) times a day, Disp: 60 tablet, Rfl: 2    aspirin 81 mg chewable tablet, Chew 1 tablet (81 mg total) 2 (two) times a day, Disp: 60 tablet, Rfl: 0    budesonide-formoterol (Symbicort) 160-4.5 mcg/act inhaler, Inhale 2 puffs 2 (two) times a day Rinse mouth after use., Disp: 10.2 g, Rfl: 11    carvedilol (COREG) 25 mg tablet, Take 1 tablet (25 mg total) by mouth 2 (two) times a day with meals, Disp: 180 tablet, Rfl: 3    cholecalciferol (VITAMIN D3) 1,000 units tablet, Take 5 tablets (5,000 Units total) by mouth daily, Disp: 90 tablet, Rfl: 5    clonazePAM (KlonoPIN) 0.5 mg tablet, Take 1 tablet (0.5 mg total) by mouth 3 (three) times a day, Disp: 270 tablet, Rfl: 0    Cyanocobalamin (VITAMIN B 12 PO), Take 1,000 mcg by mouth in the morning, Disp: , Rfl:     ferrous sulfate 324 (65 Fe) mg, Take 1 tablet (324 mg total) by mouth 2 (two) times a day before meals, Disp: 60 tablet, Rfl: 2    fluvoxaMINE (LUVOX) 100 mg tablet, Fluvoxamine 100m tablet in the morning ; 2 tablets at night (Patient taking differently: Fluvoxamine 300 mg  in the morning ;), Disp: 270 tablet, Rfl: 1    lamoTRIgine (LaMICtal) 200 MG tablet, Lamotrigine 200m tablet in the morning , 1 tablet at night, Disp: 180 tablet, Rfl: 0    Multiple Vitamin (MULTI-VITAMIN) tablet, Take 1 tablet by mouth daily, Disp: 30 tablet, Rfl: 2    omeprazole (PriLOSEC) 40 MG capsule, Take 1 capsule (40 mg total) by mouth daily before breakfast, Disp: 90 capsule, Rfl: 1    ondansetron (ZOFRAN) 4 mg tablet, Take 1 tablet (4 mg total) by mouth every 8 (eight) hours as needed for nausea or vomiting, Disp: 30 tablet, Rfl: 1    oxyCODONE (Roxicodone) 5 immediate release tablet, Take 1 tablet (5 mg total) by mouth every 4 (four) hours as needed for severe pain Continue current therapy. Max Daily Amount: 30 mg, Disp: 20 tablet, Rfl: 0    QUEtiapine  (SEROquel) 400 MG tablet, Quetiapine Fumarate 400m tablets at night, Disp: 60 tablet, Rfl: 1    rosuvastatin (CRESTOR) 20 MG tablet, Take 1 tablet (20 mg total) by mouth every evening, Disp: 90 tablet, Rfl: 3    traMADol (Ultram) 50 mg tablet, Take 1 tablet (50 mg total) by mouth every 12 (twelve) hours as needed for moderate pain, Disp: 60 tablet, Rfl: 0    folic acid (FOLVITE) 1 mg tablet, Take 1 tablet (1 mg total) by mouth daily (Patient not taking: Reported on 2024), Disp: 30 tablet, Rfl: 2    Past Medical History:   Diagnosis Date    Anxiety     Anxiety disorder     Arthritis     At risk for falls     Bipolar 2 disorder (HCC)     Chronic back pain     Chronic kidney disease     Closed fracture of distal end of right fibula with routine healing 2020    COVID-19     in 2021    CVA (cerebral vascular accident) (HCC)     noted on MRI in the past    Depression     GERD (gastroesophageal reflux disease)     Hypercholesteremia     Hypernatremia     Hypertension     Hypokalemia     Idiopathic chronic pancreatitis (HCC) 2018    Intervertebral disc disorder with radiculopathy of lumbosacral region     resolved: 2015    Kidney disease     Kidney disease     Limb alert care status     LUE-fistula    Panic attacks     Pericardial effusion     PONV (postoperative nausea and vomiting)     Psychiatric problem     Radiculitis     resolved: 2015    Secondary renal hyperparathyroidism (HCC)     Stroke (HCC)     Vitamin D deficiency      Past Surgical History:   Procedure Laterality Date    BUNIONECTOMY      Left foot     CAST APPLICATION Right 2022    Procedure: Application short-arm thumb spica splint;  Surgeon: Lyndon Victoria MD;  Location:  MAIN OR;  Service: Orthopedics    COLON SURGERY      COLONOSCOPY  2021    DILATION AND CURETTAGE OF UTERUS      INDUCED       surgically induced    VT ARTERIOVENOUS ANASTOMOSIS OPEN DIRECT Left 2019    Procedure: CREATION  FISTULA ARTERIOVENOUS (AV) left wrists possible left upper;  Surgeon: Andrey Quintero MD;  [facility]: [facility];  Service: Vascular    IL ARTHRP KNE CONDYLE&PLATU MEDIAL&LAT COMPARTMENTS Left 2/5/2024    Procedure: ARTHROPLASTY KNEE TOTAL SAME DAY;  Surgeon: Riki Ma MD;  [facility];  Service: Orthopedics    IL CORRJ HLX VLGS BNCTY SESMDC DSTL METAR OSTEOT Left 7/1/2019    Procedure: Serge bunionectomy;  Surgeon: Munir Larkin DPM;  [facility];  Service: Podiatry    IL CORRJ HLX VLGS BNCTY SESMDC DSTL METAR OSTEOT Right 8/3/2020    Procedure: BUNIONECTOMY RAFAEL;  Surgeon: Munir Larkin DPM;  [facility];  Service: Podiatry    IL CORRJ HLX VLGS BNCTY SESMDC PROX PHLX OSTEOT Right 9/27/2021    Procedure: BUNIONECTOMY TAMEKA, right tameka osteotomy and 2nd claw toe correction;  Surgeon: James R Lachman, MD;  [facility];  Service: Orthopedics    IL ERCP DX COLLECTION SPECIMEN BRUSHING/WASHING N/A 4/11/2018    Procedure: ENDOSCOPIC RETROGRADE CHOLANGIOPANCREATOGRAPHY (ERCP);  Surgeon: Alfredo Messina MD;  [facility];  Service: Gastroenterology    IL LAPAROSCOPY PROCTOPEXY PROLAPSE N/A 7/13/2018    Procedure: ROBOTIC SIGMOID RESECTION / RECTOPEXY;  Surgeon: ELPIDIO Mcnulty MD;  [facility];  Service: Colorectal    IL OPEN TREATMENT RADIAL SHAFT FRACTURE Right 10/11/2021    Procedure: OPEN REDUCTION W/ INTERNAL FIXATION (ORIF) RADIUS (WRIST), RIGHT DISTAL;  Surgeon: Riki Ma MD;  [facility];  Service: Orthopedics    IL REMOVAL IMPLANT DEEP Right 6/23/2022    Procedure: Removal of hardware volar aspect right distal radius (distal radial plate and screws);  Surgeon: Lyndon Victoria MD;  [facility];  Service: Orthopedics    IL SIGMOIDOSCOPY FLX DX W/COLLJ SPEC BR/WA IF PFRMD N/A 7/13/2018    Procedure: SIGMOIDOSCOPY FLEXIBLE;  Surgeon: ELPIDIO Mcnulty MD;  [facility];  Service: Colorectal    IL TR TDN RESTORE INTRNSC  "FUNCJ ALL 4 FNGRS Right 6/23/2022    Procedure: Right ring finger flexor digitorum superficialis to flexor pollicis longus tendon transfer;  Surgeon: Lyndon Victoria MD;  Location:  MAIN OR;  Service: Orthopedics    TUBAL LIGATION Bilateral 1997    US GUIDED THYROID BIOPSY  7/30/2019     Family History   Problem Relation Age of Onset    Bipolar disorder Mother     Mental illness Mother         depression    Stroke Mother     Dementia Mother     Colon polyps Mother     Heart disease Father     Hypertension Father     Diabetes Father     Other Family         Back disorder    Diabetes Family     Heart disease Family     Hypertension Family     Stroke Family     Thyroid disease Family     Breast cancer Paternal Grandmother         age unknown    Breast cancer Paternal Aunt         age unknown    Breast cancer Maternal Aunt         age unknown    Mental illness Sister     Colon polyps Sister     Mental illness Sister     Heart disease Sister     No Known Problems Sister     Breast cancer Sister 68    Other Son         pituitary tumor    Hypertension Son     Obesity Son     No Known Problems Son     No Known Problems Maternal Grandmother     No Known Problems Maternal Grandfather     No Known Problems Paternal Grandfather     Breast cancer Paternal Aunt         age unknown    Substance Abuse Neg Hx         neg fam hx    Colon cancer Neg Hx       reports that she has never smoked. She has never used smokeless tobacco. She reports that she does not currently use alcohol. She reports that she does not currently use drugs after having used the following drugs: Hydrocodone and Marijuana.      Physical Exam:   Vitals:    06/03/24 1152   BP: 136/70   BP Location: Right arm   Patient Position: Sitting   Cuff Size: Adult   Pulse: 84   SpO2: 95%   Weight: 92.1 kg (203 lb)   Height: 5' 4\" (1.626 m)     Body mass index is 34.84 kg/m².    General: conscious, cooperative, in no acute distress  Eyes: conjunctivae pink, anicteric " sclerae  ENT: lips and mucous membranes moist  Neck: supple, no JVD  Chest: clear breath sounds bilateral, no crackles, ronchus or wheezings  CVS: normal rate, regular rhythm  Abdomen: soft, non-tender, non-distended, normoactive bowel sounds  Extremities:  Trace edema in the lower extremity  Skin: no rash  Neuro: awake, alert, oriented  Psych:  Pleasant affect    Lab Results:    Results for orders placed or performed in visit on 05/28/24   Comprehensive metabolic panel   Result Value Ref Range    Sodium 143 135 - 147 mmol/L    Potassium 4.6 3.5 - 5.3 mmol/L    Chloride 112 (H) 96 - 108 mmol/L    CO2 23 21 - 32 mmol/L    ANION GAP 8 4 - 13 mmol/L    BUN 27 (H) 5 - 25 mg/dL    Creatinine 2.18 (H) 0.60 - 1.30 mg/dL    Glucose 104 65 - 140 mg/dL    Calcium 10.0 8.4 - 10.2 mg/dL    AST 17 13 - 39 U/L    ALT 15 7 - 52 U/L    Alkaline Phosphatase 143 (H) 34 - 104 U/L    Total Protein 7.0 6.4 - 8.4 g/dL    Albumin 4.3 3.5 - 5.0 g/dL    Total Bilirubin 0.28 0.20 - 1.00 mg/dL    eGFR 23 ml/min/1.73sq m   CBC and Platelet   Result Value Ref Range    WBC 5.76 4.31 - 10.16 Thousand/uL    RBC 3.73 (L) 3.81 - 5.12 Million/uL    Hemoglobin 11.5 11.5 - 15.4 g/dL    Hematocrit 38.4 34.8 - 46.1 %     (H) 82 - 98 fL    MCH 30.8 26.8 - 34.3 pg    MCHC 29.9 (L) 31.4 - 37.4 g/dL    RDW 13.2 11.6 - 15.1 %    Platelets 233 149 - 390 Thousands/uL    MPV 10.9 8.9 - 12.7 fL   Albumin / creatinine urine ratio   Result Value Ref Range    Creatinine, Ur 47.6 Reference range not established. mg/dL    Albumin,U,Random 33.4 (H) <20.0 mg/L    Albumin Creat Ratio 70 (H) 0 - 30 mg/g creatinine   Urinalysis with microscopic   Result Value Ref Range    Color, UA Colorless     Clarity, UA Clear     Specific Gravity, UA 1.010 1.005 - 1.030    pH, UA 6.0 4.5, 5.0, 5.5, 6.0, 6.5, 7.0, 7.5, 8.0    Leukocytes, UA Negative Negative    Nitrite, UA Negative Negative    Protein, UA Negative Negative mg/dl    Glucose, UA Negative Negative mg/dl    Ketones,  "UA Negative Negative mg/dl    Urobilinogen, UA <2.0 <2.0 mg/dl mg/dl    Bilirubin, UA Negative Negative    Occult Blood, UA Negative Negative    RBC, UA None Seen None Seen, 2-4 /hpf    WBC, UA 0-1 (A) None Seen, 2-4, 5-60 /hpf    Epithelial Cells Occasional None Seen, Occasional /hpf    Bacteria, UA None Seen None Seen, Occasional /hpf   PTH, intact   Result Value Ref Range    PTH 77.1 12.0 - 88.0 pg/mL   Phosphorus   Result Value Ref Range    Phosphorus 3.3 2.3 - 4.1 mg/dL   Uric acid   Result Value Ref Range    Uric Acid 5.9 2.0 - 7.5 mg/dL   Magnesium   Result Value Ref Range    Magnesium 2.0 1.9 - 2.7 mg/dL     *Note: Due to a large number of results and/or encounters for the requested time period, some results have not been displayed. A complete set of results can be found in Results Review.       Portions of the record may have been created with voice recognition software. Occasional wrong word or \"sound a like\" substitutions may have occurred due to the inherent limitations of voice recognition software. Read the chart carefully and recognize, using context, where substitutions have occurred.If you have any questions, please contact the dictating provider.  "

## 2024-06-04 RX ORDER — TRAMADOL HYDROCHLORIDE 50 MG/1
50 TABLET ORAL EVERY 12 HOURS PRN
Qty: 60 TABLET | Refills: 0 | Status: SHIPPED | OUTPATIENT
Start: 2024-06-04

## 2024-06-04 RX ORDER — CLONAZEPAM 0.5 MG/1
0.5 TABLET ORAL 3 TIMES DAILY
Qty: 270 TABLET | Refills: 0 | Status: SHIPPED | OUTPATIENT
Start: 2024-06-04 | End: 2024-09-02

## 2024-06-10 ENCOUNTER — SOCIAL WORK (OUTPATIENT)
Dept: BEHAVIORAL/MENTAL HEALTH CLINIC | Facility: CLINIC | Age: 64
End: 2024-06-10
Payer: MEDICARE

## 2024-06-10 DIAGNOSIS — F41.1 GENERALIZED ANXIETY DISORDER: ICD-10-CM

## 2024-06-10 DIAGNOSIS — F31.4 SEVERE DEPRESSED BIPOLAR I DISORDER WITHOUT PSYCHOTIC FEATURES (HCC): Primary | ICD-10-CM

## 2024-06-10 DIAGNOSIS — F42.2 MIXED OBSESSIONAL THOUGHTS AND ACTS: ICD-10-CM

## 2024-06-10 PROCEDURE — 90834 PSYTX W PT 45 MINUTES: CPT | Performed by: COUNSELOR

## 2024-06-10 NOTE — PSYCH
"Behavioral Health Psychotherapy Progress Note    Psychotherapy Provided: Individual Psychotherapy     1. Severe depressed bipolar I disorder without psychotic features (HCC)        2. Mixed obsessional thoughts and acts        3. Generalized anxiety disorder            Goals addressed in session: Goal 1     DATA: Ayleen reports that she continues to miss her dog and her two sons who don't talk to her.  She said she contacted her one son, who is more available, and asked about his brother but he tells her to \"move on.\"  Ayleen said she continues to feel unloved in her current living situation with her sister and her niece.  She said \"when I walk in the room they are in, they stop talking and I'm sure they're talking about me.\"  This clinician asked Ayleen that if she's not 100% they are talking about her, that maybe they aren't and perhaps there are things she can do to include herself in with others.  Ayleen feels that when she can do steps in the house easier, it will help her too if she can return to her bedroom versus sleeping in the common area in the house.  She said she misses her privacy and thinks this could help her a great deal.  During this session, this clinician used the following therapeutic modalities: Supportive Psychotherapy    Substance Abuse was not addressed during this session. If the client is diagnosed with a co-occurring substance use disorder, please indicate any changes in the frequency or amount of use: N/A. Stage of change for addressing substance use diagnoses: No substance use/Not applicable    ASSESSMENT:  Ayleen Moralez presents with a Euthymic/ normal mood.     her affect is Normal range and intensity, which is congruent, with her mood and the content of the session. The client has not made progress on their goals.    Ayleen Moralez presents with a none risk of suicide, none risk of self-harm, and none risk of harm to others.    For any risk assessment that surpasses a " "\"low\" rating, a safety plan must be developed.    A safety plan was indicated: no  If yes, describe in detail N/A    PLAN: Between sessions, Ayleen Moralez will try to make an effort to see if she can connect more with her sister and her niece. At the next session, the therapist will use Supportive Psychotherapy to address goals/needs/concerns.    Behavioral Health Treatment Plan and Discharge Planning: Ayleen Moralez is aware of and agrees to continue to work on their treatment plan. They have identified and are working toward their discharge goals. yes    Visit start and stop times:    06/10/24  Start Time: 1400  Stop Time: 1449  Total Visit Time: 49 minutes  "

## 2024-06-11 DIAGNOSIS — K21.9 GASTROESOPHAGEAL REFLUX DISEASE, UNSPECIFIED WHETHER ESOPHAGITIS PRESENT: ICD-10-CM

## 2024-06-12 RX ORDER — OMEPRAZOLE 40 MG/1
40 CAPSULE, DELAYED RELEASE ORAL
Qty: 90 CAPSULE | Refills: 1 | Status: SHIPPED | OUTPATIENT
Start: 2024-06-12

## 2024-06-14 NOTE — PATIENT INSTRUCTIONS
Ayleen Moralez [id number]   Thanks for presenting to your [facility] appointment   Encouraged to restart Benztropine : 1 tablet twice daily   Your other medications were not changed

## 2024-06-14 NOTE — PSYCH
[facility]: [facility]   Mental Health Outpatient Clinic/Non Addiction   [address] [id number] [phone]    Psychiatric Progress Note  MRN#: [id number]  Ayleen Moralez 63 y.o. female    This note was not shared with the patient due to reasonable likelihood of causing patient harm       _________________________________________________________________________________________________________________________________  OFFICE APPOINTMENT   Seen today at [facility] location                                                Patient Ayleen Moralez ,1960   Prescriber/Physician: Esme Medina DO Physician Location:   [facility] HEALTH OUTPATIENT  [address] [facility] [address]     This service was provided in the office.  Patient is currently located in the [place], where I am  licensed.   Patient gave consent to proceed with encounter; acknowledge understanding of security and privacy of encounter   [facility]  Patient verbalized understanding evaluation only involves Psychiatric diagnosing, prescribing, result monitoring   Patient was informed this is a billable service and legal   ___________________________________________________________________________________________________________________________________     Reason for Visit:                         Chief Complaint   Patient presents with    Depression       Subjective:  Ayleen Moralez stated depressed increased , presents as quiros , isolative ,otherwise thinking medications is working, able to sleep thru the night  since higher dose Seroquel.  Ayleen Moralez  stopped haldol ,without side effects, denies hearing voices but jealous of sister and her dog; otherwise persistance delusions thoughts sister and niece talking about her , thinking people are watching her ; associated anger .     Discussed gathering  collateral, Ayleen Moralez reported closeness to her sister Sherie and not the one she resides without. Although  requested both sisters are not called.     ROS:  OCD-  Ayleen Moralez reported driving around corner and stores making sure she did not hit anyone; typically person is not there, but if they were gestured at fearfulness. Onset of OCD x years, has improved since , as routines happens about 7 x wkly and Ayleen Moralez reported typically takes 10-15mins to complete, and typically she avoids driving  Energy:  stable   Appetite problem; normal   Si/Hi- w/o    Medication: Ayleen Moralez increased  Seroquel 800mg,  Lamotrigine 200mg x 2, Luvox 300mg ; not on Benztropine   Med s/e- denies    Social History     Tobacco Use    Smoking status: Never    Smokeless tobacco: Never   Substance Use Topics    Alcohol use: Not Currently             Medical ROS:   Review of Systems   Cardiovascular:  Negative for chest pain and palpitations.   Musculoskeletal:         Denies spasm and stiffness   Neurological:  Negative for tremors.        Ayleen Moralez s/p lower Left extremities, with abnormal gait and pain, plans to restart PT    Also other Pertinent items are noted in above, all other symptoms are negative           Medications Trials:  History of :ECT x 30 yrs ago- did not work , Effexor, Imprimine, Lithium- can't take due to kidney dz( external records reviewed- CKD stage 4) , Risperidone - not sure as to why , Tofinail, Xanax,  Zoloft- did not work;Buspar - did not work ,  Luvox- not sure why first attempt was stopped, Prozac - was stopped cause of pregnancy, Trazodone- not sure,    During course of treatment: Aripiprazole 10mg- tiredness, Venlafaxine (2nd trial- precipitated hanh), Haldol 20mg-24mg (ineffective)      Mental Status Evaluation:  General Appearance:  Ayleen Moralez is a 63 y.o.  female casually dressed, looks stated age, Wearing glasses .    Behavior:  Calm,  appropriate eye gaze    Speech:  Soft , non-pressured, low tone, not mumbling , more coherent    Mood:  Depressed    Affect:  Normal to slight frustration based on content of discussion    Thought Process:  Disorganization , logical to illogical    Thought Content:  persecutory delusions, paranoid ideation, ruminations , negative thinking,    Perceptual Disturbances: denies auditory hallucinations when asked, denies visual hallucinations when asked, Does not appear responding or preoccupied   Delusions  YES   Risk Potential: Suicidal Ideations w/o  Homicidal Ideations w/o  Potential for Aggression w/o   Sensorium:  Oriented to person, place ( [facility]), time/date ( June 2024)and situation    Memory:  recent and remote memory grossly intact   Consciousness:  alert and awake   Attention: Attention and concentration are appropriate   Insight:  Limited to fair   Judgment: Fair to appropriate    Gait/Station: normal   Motor Activity: no abnormal movements     There were no vitals filed for this visit.     Current Outpatient Medications on File Prior to Visit   Medication Sig Dispense Refill    acetaminophen (TYLENOL) 500 mg tablet Take 2 tablets (1,000 mg total) by mouth every 8 (eight) hours 60 tablet 0    albuterol (Ventolin HFA) 90 mcg/act inhaler Inhale 2 puffs every 6 (six) hours as needed for wheezing or shortness of breath 8.5 g 3    alendronate (Fosamax) 70 mg tablet Take 1 tablet (70 mg total) by mouth every 7 days 4 tablet 5    AMILoride 5 mg tablet Take 1 tablet (5 mg total) by mouth 2 (two) times a day (Patient taking differently: Take 5 mg by mouth 2 (two) times a day) 180 tablet 3    amLODIPine (NORVASC) 5 mg tablet Take 1 tablet (5 mg total) by mouth daily 90 tablet 3    ascorbic acid (VITAMIN C) 500 MG tablet Take 1 tablet (500 mg total) by mouth 2 (two) times a day 60 tablet 2    aspirin 81 mg chewable tablet Chew 1 tablet (81 mg total) 2 (two) times a day 60 tablet 0    budesonide-formoterol  (Symbicort) 160-4.5 mcg/act inhaler Inhale 2 puffs 2 (two) times a day Rinse mouth after use. 10.2 g 11    carvedilol (COREG) 25 mg tablet Take 1 tablet (25 mg total) by mouth 2 (two) times a day with meals 180 tablet 3    cholecalciferol (VITAMIN D3) 1,000 units tablet Take 5 tablets (5,000 Units total) by mouth daily 90 tablet 5    clonazePAM (KlonoPIN) 0.5 mg tablet Take 1 tablet (0.5 mg total) by mouth 3 (three) times a day 270 tablet 0    Cyanocobalamin (VITAMIN B 12 PO) Take 1,000 mcg by mouth in the morning      ferrous sulfate 324 (65 Fe) mg Take 1 tablet (324 mg total) by mouth 2 (two) times a day before meals 60 tablet 2    fluvoxaMINE (LUVOX) 100 mg tablet Fluvoxamine 100m tablet in the morning ; 2 tablets at night (Patient taking differently: Fluvoxamine 300 mg  in the morning ;) 270 tablet 1    folic acid (FOLVITE) 1 mg tablet Take 1 tablet (1 mg total) by mouth daily (Patient not taking: Reported on 2024) 30 tablet 2    lamoTRIgine (LaMICtal) 200 MG tablet Lamotrigine 200m tablet in the morning , 1 tablet at night 180 tablet 0    Multiple Vitamin (MULTI-VITAMIN) tablet Take 1 tablet by mouth daily 30 tablet 2    omeprazole (PriLOSEC) 40 MG capsule TAKE 1 CAPSULE BY MOUTH DAILY  BEFORE BREAKFAST 90 capsule 1    ondansetron (ZOFRAN) 4 mg tablet Take 1 tablet (4 mg total) by mouth every 8 (eight) hours as needed for nausea or vomiting 30 tablet 1    oxyCODONE (Roxicodone) 5 immediate release tablet Take 1 tablet (5 mg total) by mouth every 4 (four) hours as needed for severe pain Continue current therapy. Max Daily Amount: 30 mg 20 tablet 0    QUEtiapine (SEROquel) 400 MG tablet Quetiapine Fumarate 400m tablets at night 60 tablet 1    rosuvastatin (CRESTOR) 20 MG tablet Take 1 tablet (20 mg total) by mouth every evening 90 tablet 3    traMADol (Ultram) 50 mg tablet Take 1 tablet (50 mg total) by mouth every 12 (twelve) hours as needed for moderate pain 60 tablet 0     No current  facility-administered medications on file prior to visit.      Labs: I have personally reviewed all pertinent laboratory/tests results.   Most Recent Labs:     Appointment on 05/28/2024   Component Date Value Ref Range Status    Sodium 05/28/2024 143  135 - 147 mmol/L Final    Potassium 05/28/2024 4.6  3.5 - 5.3 mmol/L Final    Chloride 05/28/2024 112 (H)  96 - 108 mmol/L Final    CO2 05/28/2024 23  21 - 32 mmol/L Final    ANION GAP 05/28/2024 8  4 - 13 mmol/L Final    BUN 05/28/2024 27 (H)  5 - 25 mg/dL Final    Creatinine 05/28/2024 2.18 (H)  0.60 - 1.30 mg/dL Final    Standardized to IDMS reference method    Glucose 05/28/2024 104  65 - 140 mg/dL Final    If the patient is fasting, the ADA then defines impaired fasting glucose as > 100 mg/dL and diabetes as > or equal to 123 mg/dL.    Calcium 05/28/2024 10.0  8.4 - 10.2 mg/dL Final    AST 05/28/2024 17  13 - 39 U/L Final    ALT 05/28/2024 15  7 - 52 U/L Final    Specimen collection should occur prior to Sulfasalazine administration due to the potential for falsely depressed results.     Alkaline Phosphatase 05/28/2024 143 (H)  34 - 104 U/L Final    Total Protein 05/28/2024 7.0  6.4 - 8.4 g/dL Final    Albumin 05/28/2024 4.3  3.5 - 5.0 g/dL Final    Total Bilirubin 05/28/2024 0.28  0.20 - 1.00 mg/dL Final    Use of this assay is not recommended for patients undergoing treatment with eltrombopag due to the potential for falsely elevated results.  N-acetyl-p-benzoquinone imine (metabolite of Acetaminophen) will generate erroneously low results in samples for patients that have taken an overdose of Acetaminophen.    eGFR 05/28/2024 23  ml/min/1.73sq m Final    WBC 05/28/2024 5.76  4.31 - 10.16 Thousand/uL Final    RBC 05/28/2024 3.73 (L)  3.81 - 5.12 Million/uL Final    Hemoglobin 05/28/2024 11.5  11.5 - 15.4 g/dL Final    Hematocrit 05/28/2024 38.4  34.8 - 46.1 % Final    MCV 05/28/2024 103 (H)  82 - 98 fL Final    MCH 05/28/2024 30.8  26.8 - 34.3 pg Final    MCHC  05/28/2024 29.9 (L)  31.4 - 37.4 g/dL Final    RDW 05/28/2024 13.2  11.6 - 15.1 % Final    Platelets 05/28/2024 233  149 - 390 Thousands/uL Final    MPV 05/28/2024 10.9  8.9 - 12.7 fL Final    Creatinine, Ur 05/28/2024 47.6  Reference range not established. mg/dL Final    Albumin,U,Random 05/28/2024 33.4 (H)  <20.0 mg/L Final    Albumin Creat Ratio 05/28/2024 70 (H)  0 - 30 mg/g creatinine Final    Color, UA 05/28/2024 Colorless   Final    Clarity, UA 05/28/2024 Clear   Final    Specific Gravity, UA 05/28/2024 1.010  1.005 - 1.030 Final    pH, UA 05/28/2024 6.0  4.5, 5.0, 5.5, 6.0, 6.5, 7.0, 7.5, 8.0 Final    Leukocytes, UA 05/28/2024 Negative  Negative Final    Nitrite, UA 05/28/2024 Negative  Negative Final    Protein, UA 05/28/2024 Negative  Negative mg/dl Final    Glucose, UA 05/28/2024 Negative  Negative mg/dl Final    Ketones, UA 05/28/2024 Negative  Negative mg/dl Final    Urobilinogen, UA 05/28/2024 <2.0  <2.0 mg/dl mg/dl Final    Bilirubin, UA 05/28/2024 Negative  Negative Final    Occult Blood, UA 05/28/2024 Negative  Negative Final    RBC, UA 05/28/2024 None Seen  None Seen, 2-4 /hpf Final    WBC, UA 05/28/2024 0-1 (A)  None Seen, 2-4, 5-60 /hpf Final    Epithelial Cells 05/28/2024 Occasional  None Seen, Occasional /hpf Final    Bacteria, UA 05/28/2024 None Seen  None Seen, Occasional /hpf Final    PTH 05/28/2024 77.1  12.0 - 88.0 pg/mL Final    Phosphorus 05/28/2024 3.3  2.3 - 4.1 mg/dL Final    Uric Acid 05/28/2024 5.9  2.0 - 7.5 mg/dL Final    Specimen collection should occur prior to Metamizole administration due to the potential for falsely depressed results.    Magnesium 05/28/2024 2.0  1.9 - 2.7 mg/dL Final     Lipids abnormal high   08/2022 09/08/23 ECHO ( EF 65%).    05/06/2024 WNL CBC diff, HgA1C , lipid panel ; CMP except BUN/CR 30:1 ratio,   ________________________________________________________________________    A/P  Ayleen Moralez,63 y.o.  female, history of  Polysubstance  abuse ( cocaine, marijuana), multiple hospitalizations, several suicide attempts, anorexia, OCD, bipolar disorder.  Interim events of delusions, and hanh findings , D/C Venlafaxine , also recently Haldol 20mg-25mg , ineffectiveness regarding psychosis(hallucination, delusions) and had hanh like findings. Recently increase Seroquel to 800mg , Ayleen Moralez  has improved sleep, more calm affect, non-pressured, less aggravated, devoid of hallucination, al thought persistent precursory delusions. Was not hospitalized devoid of any noticeable threat to herself or others.       DSM5  1. Schizophrenia, unspecified type (HCC)    2. Bipolar I disorder, most recent episode manic (HCC)          PLAN:    History and external records reviewed.  Discussed clinical findings and  diagnostic impression; persistent delusion, otherwise not hanh or depressed  Was encouraged to sign CAROLIN for collateral, pt was resistant  Completed AIMS, Ayleen Moralez has isolated lip puckering, hence was encourage to restart Benztropine  Started Benztropine 1mg tablet twice daily    Did not change other medications       Medications Prescribed During SLPF Encounter:    -     benztropine (COGENTIN) 1 mg tablet; Benztropine Mesylate 1 m tablet in the morning and 1 tablet at night  -     QUEtiapine (SEROquel) 400 MG tablet; Quetiapine Fumarate 400m tablets at night       Medications List Also:       -     lamoTRIgine (LaMICtal) 200 MG tablet; Lamotrigine 200m tablet in the morning and 1 tablet at night       -     fluvoxaMINE (LUVOX) 100 mg tablet; Fluvoxamine 100m tablet in the morning ; 2 tablets at night       Ayleen Moralez - Pharmacies: [facility] ;   Lamotrigine , Quetiapine                                                              [facility]  Fluvoxamine , Benztropine       Treatement: Risks, benefits, and possible side effects of medications explained to patient and patient verbalizes understanding.        Next SLPF  Appointment:  1.5 month    ___________________________________________________________________________________________________________________    Patient Encounter Duration:    Start Time: 11:29 AM  Stop Time: 12:00 PM  Documentin:01 PM-12:21 PM  Total Encounter Time :  31 minutes  was  spent with the Patient. Greater than 50% of total time was spent with the patient       MDM  Number of Diagnoses or Management Options  Diagnosis management comments: 2       Amount and/or Complexity of Data Reviewed  Review and summarize past medical records: yes    Risk of Complications, Morbidity, and/or Mortality  Presenting problems: moderate  Diagnostic procedures: moderate  Management options: moderate         This note may have been written with the assistance of dictation software. Please excuse any grammatical  errors, misspellings,  and abnormal spacing of letters , sentences or   paragraphs . For accurate interpretation should read note horizontally

## 2024-06-14 NOTE — ASSESSMENT & PLAN NOTE
CKD stage 4 not on HD yet  S/p Left AVF creation, will obtain hemodialysis scan at 1 month from surgery to assess flow volumes/adequacy for dialysis 
S/p Left Cralitos (radiocephalic) arteriovenous fistula creation 11/18/19  Doing well but reporting left hand paresthesias/cramping, worst in the thumb  Left hand  4/5  Palpable brachial, excellent thrill over AVF, palpable ulnar artery, multiphasic palmar arch and digital arterial doppler signals, slightly dampened in 1st and 2nd digits  Incision healed  Has not started dialysis yet, discussion to start in 8 weeks or so per her nephrologist     -Will obtain hemodialysis access scan in 2 weeks to assess flow volumes    -Discussed steal syndrome and associated signs/symptoms - she has mild steal symptoms, not disabling   Will obtain arterial duplex with digital pressures with AVF open and compressed  -Encouraged left hand exercises and continued activity  -Will start low dose gabapentin for pain  -Follow-up in 1 month
Yes

## 2024-06-17 ENCOUNTER — OFFICE VISIT (OUTPATIENT)
Dept: PSYCHIATRY | Facility: CLINIC | Age: 64
End: 2024-06-17
Payer: MEDICARE

## 2024-06-17 DIAGNOSIS — F31.10 BIPOLAR I DISORDER, MOST RECENT EPISODE MANIC (HCC): ICD-10-CM

## 2024-06-17 DIAGNOSIS — F20.9 SCHIZOPHRENIA, UNSPECIFIED TYPE (HCC): Primary | ICD-10-CM

## 2024-06-17 PROCEDURE — 99214 OFFICE O/P EST MOD 30 MIN: CPT | Performed by: PSYCHIATRY & NEUROLOGY

## 2024-06-17 RX ORDER — BENZTROPINE MESYLATE 1 MG/1
TABLET ORAL
Qty: 60 TABLET | Refills: 1 | Status: SHIPPED | OUTPATIENT
Start: 2024-06-17

## 2024-06-17 RX ORDER — QUETIAPINE FUMARATE 400 MG/1
TABLET, FILM COATED ORAL
Qty: 180 TABLET | Refills: 0 | Status: SHIPPED | OUTPATIENT
Start: 2024-06-17

## 2024-06-18 ENCOUNTER — TELEPHONE (OUTPATIENT)
Age: 64
End: 2024-06-18

## 2024-06-18 ENCOUNTER — TELEPHONE (OUTPATIENT)
Dept: OBGYN CLINIC | Facility: HOSPITAL | Age: 64
End: 2024-06-18

## 2024-06-18 DIAGNOSIS — Z96.652 AFTERCARE FOLLOWING LEFT KNEE JOINT REPLACEMENT SURGERY: Primary | ICD-10-CM

## 2024-06-18 DIAGNOSIS — Z47.1 AFTERCARE FOLLOWING LEFT KNEE JOINT REPLACEMENT SURGERY: Primary | ICD-10-CM

## 2024-06-18 NOTE — TELEPHONE ENCOUNTER
Caller: Patient    Doctor: Francesca    Reason for call: Patient asking for a new order for PT.    Call back#: [phone]

## 2024-06-24 ENCOUNTER — TELEPHONE (OUTPATIENT)
Dept: PSYCHIATRY | Facility: CLINIC | Age: 64
End: 2024-06-24

## 2024-06-24 NOTE — PROGRESS NOTES
Patient is requesting to speak with RN regarding [employer] for Cpap supplies.  He wants to know if there is another company he can use for Cpap and supplies.  He states that he is unhappy with their customer service and is tired of them requesting for him to Auto Pay and Auto refill.     This patient was seen on 8/24/20  Assessment:    Problem List Items Addressed This Visit        Musculoskeletal and Integument    Hallux valgus, right - Primary    Relevant Orders    X-ray foot right 3+ views       Other    Postop check          Plan:  Sutures removed  Continue in surgical shoe  May shower  Xrays ordered in light of recent contusion  Diagnoses and all orders for this visit:    Hallux valgus, right  -     X-ray foot right 3+ views; Future    Postop check          Subjective:      Patient ID: Tresa Lay is a 64 y o  female  Soheila Handler is here for a post-op appointment  She has some tenderness at her surgical site as she states she "hit the toe against the refridgerator door" a couple of days ago  The following portions of the patient's history were reviewed and updated as appropriate: allergies, current medications, past family history, past medical history, past social history, past surgical history and problem list     Review of Systems   Constitutional: Negative  Respiratory: Negative  Objective:      Temp 98 7 °F (37 1 °C)   Ht 5' 7" (1 702 m)   Wt 87 1 kg (192 lb)   BMI 30 07 kg/m²          Physical Exam  Vitals signs and nursing note reviewed  Constitutional:       General: She is not in acute distress  Appearance: Normal appearance  Pulmonary:      Effort: Pulmonary effort is normal       Breath sounds: Normal breath sounds  Neurological:      Mental Status: She is alert  The incision is well-coapted without necrosis, signs of infection, dehiscence, drainage  All sutures are intact  The calf is soft and non-tender  Vascular status is intact to all toes with brisk capillary fill and intact pedal pulses and normal sensation  Normal post-op edema and bruising is noted

## 2024-06-24 NOTE — TELEPHONE ENCOUNTER
Client called inquiring as to balance on her account. Writer asked if she specifically wanted the behavioral health balance, client confirmed. Writer looked up client in payment collection where it had a listed balance of $111.33.

## 2024-06-24 NOTE — PROGRESS NOTES
Assessment/Plan:             Problem List Items Addressed This Visit        Respiratory    Moderate persistent asthma without complication       Cardiovascular and Mediastinum    Accelerated hypertension - Primary       Other    Acute shoulder pain      Other Visit Diagnoses     Acute nonintractable headache, unspecified headache type        Earache on right                Subjective:      Patient ID: Gabriela Sena is a 63 y o  female    1  htn- accelerated- on coreg 6  25- will increase to 12 5 mg bid and continue on amiloride 10 mgbid with dr Mamie Barclay      The following portions of the patient's history were reviewed and updated as appropriate: allergies, current medications and problem list      Review of Systems   Constitutional: Negative for chills, fever and unexpected weight change  HENT: Negative for congestion  Respiratory: Negative for cough  Cardiovascular: Positive for palpitations  Negative for leg swelling  Musculoskeletal: Positive for neck pain  Negative for back pain  Neurological: Positive for dizziness and headaches  All other systems reviewed and are negative  BMI Counseling: Body mass index is 34 21 kg/m²  The BMI is above normal  Exercise recommendations include exercising 3-5 times per week  Objective:      Current Outpatient Medications:     acetaminophen (TYLENOL) 325 mg tablet, Take 650 mg by mouth every 6 (six) hours as needed for mild pain, Disp: , Rfl:     albuterol (Ventolin HFA) 90 mcg/act inhaler, Inhale 2 puffs every 6 (six) hours as needed for wheezing or shortness of breath, Disp: 8 5 g, Rfl: 3    aspirin 81 mg chewable tablet, Chew 1 tablet (81 mg total) daily, Disp: , Rfl:     budesonide-formoterol (SYMBICORT) 160-4 5 mcg/act inhaler, Inhale 2 puffs 2 (two) times a day Rinse mouth after use   (Patient taking differently: Inhale 2 puffs 2 (two) times a day as needed Rinse mouth after use ), Disp: 30 6 g, Rfl: 3    carvedilol (COREG) 6 25 mg tablet, Plan:  Sabina Bocanegra, it was nice seeing you today  Start Prozac 20 mg daily  Start clonidine 0.1 mg twice daily.  He can take the second dose in the evening or at bedtime.  Monitor closely for side effects including but not limited to lightheadedness or dizziness  You can continue hydroxyzine as needed for anxiety up to 3 times a day  Would encourage you to work in the outpatient therapy options  Monitor closely for any symptoms of julisa or hypomania as we make these medication adjustments and if present please let me know  Follow-up in 3 weeks for reach out sooner if needed      -Please take medications as prescribed  -Refrain from alcohol or drug use  -Seek emergency help via the emergency and/or calling 911 should symptoms become severe, worsen, or with other concerning symptoms.Go immediately to the emergency room and/or call 911 with any suicidal or homicidal ideations or if audio/visual hallucinations develop.   -Contact office with any questions or concerns.      Crisis phone numbers:  988-national suicide hotline, call or text  [facility], [facility] [phone].  [facility], [facility] [phone]  [facility] [phone].  [facility] [phone].  [facility] [phone].  [facility], [facility] [phone].    Take 1 tablet (6 25 mg total) by mouth 2 (two) times a day with meals, Disp: 180 tablet, Rfl: 3    clonazePAM (KlonoPIN) 0 5 mg tablet, Take 1 tablet (0 5 mg total) by mouth 3 (three) times a day, Disp: 270 tablet, Rfl: 0    fluocinonide (LIDEX) 0 05 % ointment, Apply topically 2 (two) times a day, Disp: 60 g, Rfl: 1    fluvoxaMINE (LUVOX) 100 mg tablet, 2 (two) times a day 1 tab am , 2 tabs HS, Disp: , Rfl:     HYDROcodone-acetaminophen (NORCO) 5-325 mg per tablet, Take 1 tablet by mouth every 8 (eight) hours as needed (moderate-severe headache pain) for up to 10 doses Max Daily Amount: 3 tablets, Disp: 10 tablet, Rfl: 0    lamoTRIgine (LaMICtal) 200 MG tablet, Take 200 mg by mouth 2 (two) times a day , Disp: , Rfl:     linaCLOtide (Linzess) 290 MCG CAPS, Take 1 capsule by mouth daily, Disp: 90 capsule, Rfl: 0    omeprazole (PriLOSEC) 40 MG capsule, Take 1 capsule (40 mg total) by mouth daily before breakfast, Disp: 90 capsule, Rfl: 3    ondansetron (ZOFRAN) 4 mg tablet, Take 1 tablet (4 mg total) by mouth every 8 (eight) hours as needed for nausea or vomiting, Disp: 20 tablet, Rfl: 0    ondansetron (ZOFRAN-ODT) 4 mg disintegrating tablet, DISSOLVE 1 TABLET IN MOUTH EVERY 8 HOURS AS NEEDED FOR NAUSEA AND VOMITING  Not for daily use  Use sparingly  , Disp: 60 tablet, Rfl: 0    Polyethylene Glycol 3350 (MIRALAX PO), Take by mouth as needed , Disp: , Rfl:     QUEtiapine (SEROquel) 100 mg tablet, Take 1 tablet by mouth daily after breakfast , Disp: , Rfl:     QUEtiapine (SEROquel) 300 mg tablet, 1 at bedtime along with a 400 mg tab, Disp: , Rfl:     QUEtiapine (SEROquel) 400 MG tablet, 1 at bedtime along with a 300 mg tab , Disp: , Rfl:     rosuvastatin (CRESTOR) 20 MG tablet, Take 1 tablet (20 mg total) by mouth daily, Disp: 90 tablet, Rfl: 1    AMILoride 5 mg tablet, Take 1 tablet by mouth twice daily (Patient not taking: Reported on 1/3/2022), Disp: 120 tablet, Rfl: 3    Blood pressure (!) 172/94, pulse 96, height 5' 7" (1 702 m), weight 99 1 kg (218 lb 6 4 oz), SpO2 95 %, not currently breastfeeding  Physical Exam  Vitals and nursing note reviewed  Constitutional:       General: She is not in acute distress  HENT:      Right Ear: There is impacted cerumen  Ears:      Comments: Right tm not viz- impacted cerumen     Nose: No congestion  Eyes:      General:         Right eye: No discharge  Left eye: No discharge  Cardiovascular:      Rate and Rhythm: Normal rate and regular rhythm  Heart sounds: No murmur heard  Pulmonary:      Breath sounds: No wheezing or rhonchi  Abdominal:      General: Abdomen is flat  Palpations: Abdomen is soft  Tenderness: There is no abdominal tenderness  There is no rebound  Musculoskeletal:         General: No swelling or tenderness  Cervical back: No tenderness  Lymphadenopathy:      Cervical: No cervical adenopathy  Skin:     General: Skin is warm and dry  Findings: No erythema  Neurological:      General: No focal deficit present  Mental Status: She is alert        Comments: Speech is clear   Psychiatric:      Comments: Mild distracted

## 2024-06-25 ENCOUNTER — HOSPITAL ENCOUNTER (EMERGENCY)
Facility: HOSPITAL | Age: 64
Discharge: HOME | End: 2024-06-25
Attending: EMERGENCY MEDICINE
Payer: MEDICARE

## 2024-06-25 ENCOUNTER — APPOINTMENT (EMERGENCY)
Dept: RADIOLOGY | Facility: HOSPITAL | Age: 64
End: 2024-06-25
Payer: MEDICARE

## 2024-06-25 ENCOUNTER — TELEPHONE (OUTPATIENT)
Dept: FAMILY MEDICINE CLINIC | Facility: HOSPITAL | Age: 64
End: 2024-06-25

## 2024-06-25 ENCOUNTER — APPOINTMENT (EMERGENCY)
Dept: CT IMAGING | Facility: HOSPITAL | Age: 64
End: 2024-06-25
Payer: MEDICARE

## 2024-06-25 VITALS
RESPIRATION RATE: 24 BRPM | HEART RATE: 71 BPM | DIASTOLIC BLOOD PRESSURE: 64 MMHG | TEMPERATURE: 97.6 F | SYSTOLIC BLOOD PRESSURE: 136 MMHG | OXYGEN SATURATION: 93 %

## 2024-06-25 DIAGNOSIS — S09.90XA HEAD INJURY: ICD-10-CM

## 2024-06-25 DIAGNOSIS — E04.1 THYROID NODULE: ICD-10-CM

## 2024-06-25 DIAGNOSIS — S20.229A CONTUSION, BACK: ICD-10-CM

## 2024-06-25 DIAGNOSIS — T14.8XXA HEMATOMA: ICD-10-CM
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
## 2024-06-25 DIAGNOSIS — S16.1XXA CERVICAL STRAIN, ACUTE: ICD-10-CM

## 2024-06-25 LAB
ABO GROUP BLD: NORMAL
ALBUMIN SERPL BCG-MCNC: 4 G/DL (ref 3.5–5)
ALP SERPL-CCNC: 124 U/L (ref 34–104)
ALT SERPL W P-5'-P-CCNC: 17 U/L (ref 7–52)
ANION GAP SERPL CALCULATED.3IONS-SCNC: 7 MMOL/L (ref 4–13)
APTT PPP: 29 SECONDS (ref 23–37)
AST SERPL W P-5'-P-CCNC: 20 U/L (ref 13–39)
BASOPHILS # BLD AUTO: 0.05 THOUSANDS/ÂΜL (ref 0–0.1)
BASOPHILS NFR BLD AUTO: 1 % (ref 0–1)
BILIRUB SERPL-MCNC: 0.33 MG/DL (ref 0.2–1)
BLD GP AB SCN SERPL QL: NEGATIVE
BUN SERPL-MCNC: 30 MG/DL (ref 5–25)
CALCIUM SERPL-MCNC: 9.4 MG/DL (ref 8.4–10.2)
CARDIAC TROPONIN I PNL SERPL HS: 6 NG/L
CHLORIDE SERPL-SCNC: 107 MMOL/L (ref 96–108)
CO2 SERPL-SCNC: 23 MMOL/L (ref 21–32)
CREAT SERPL-MCNC: 2.58 MG/DL (ref 0.6–1.3)
EOSINOPHIL # BLD AUTO: 0.16 THOUSAND/ÂΜL (ref 0–0.61)
EOSINOPHIL NFR BLD AUTO: 2 % (ref 0–6)
ERYTHROCYTE [DISTWIDTH] IN BLOOD BY AUTOMATED COUNT: 13.2 % (ref 11.6–15.1)
GFR SERPL CREATININE-BSD FRML MDRD: 19 ML/MIN/1.73SQ M
GLUCOSE SERPL-MCNC: 107 MG/DL (ref 65–140)
HCT VFR BLD AUTO: 33.4 % (ref 34.8–46.1)
HGB BLD-MCNC: 10.1 G/DL (ref 11.5–15.4)
IMM GRANULOCYTES # BLD AUTO: 0.04 THOUSAND/UL (ref 0–0.2)
IMM GRANULOCYTES NFR BLD AUTO: 1 % (ref 0–2)
INR PPP: 0.98 (ref 0.84–1.19)
LYMPHOCYTES # BLD AUTO: 1.43 THOUSANDS/ÂΜL (ref 0.6–4.47)
LYMPHOCYTES NFR BLD AUTO: 22 % (ref 14–44)
MCH RBC QN AUTO: 31.5 PG (ref 26.8–34.3)
MCHC RBC AUTO-ENTMCNC: 30.2 G/DL (ref 31.4–37.4)
MCV RBC AUTO: 104 FL (ref 82–98)
MONOCYTES # BLD AUTO: 0.58 THOUSAND/ÂΜL (ref 0.17–1.22)
MONOCYTES NFR BLD AUTO: 9 % (ref 4–12)
NEUTROPHILS # BLD AUTO: 4.28 THOUSANDS/ÂΜL (ref 1.85–7.62)
NEUTS SEG NFR BLD AUTO: 65 % (ref 43–75)
NRBC BLD AUTO-RTO: 0 /100 WBCS
PLATELET # BLD AUTO: 192 THOUSANDS/UL (ref 149–390)
PMV BLD AUTO: 10.2 FL (ref 8.9–12.7)
POTASSIUM SERPL-SCNC: 4.9 MMOL/L (ref 3.5–5.3)
PROT SERPL-MCNC: 6.8 G/DL (ref 6.4–8.4)
PROTHROMBIN TIME: 13.4 SECONDS (ref 11.6–14.5)
RBC # BLD AUTO: 3.21 MILLION/UL (ref 3.81–5.12)
RH BLD: POSITIVE
SODIUM SERPL-SCNC: 137 MMOL/L (ref 135–147)
SPECIMEN EXPIRATION DATE: NORMAL
WBC # BLD AUTO: 6.54 THOUSAND/UL (ref 4.31–10.16)

## 2024-06-25 PROCEDURE — 70450 CT HEAD/BRAIN W/O DYE: CPT

## 2024-06-25 PROCEDURE — 80053 COMPREHEN METABOLIC PANEL: CPT | Performed by: PHYSICIAN ASSISTANT

## 2024-06-25 PROCEDURE — 86850 RBC ANTIBODY SCREEN: CPT | Performed by: PHYSICIAN ASSISTANT

## 2024-06-25 PROCEDURE — 72170 X-RAY EXAM OF PELVIS: CPT

## 2024-06-25 PROCEDURE — 71045 X-RAY EXAM CHEST 1 VIEW: CPT

## 2024-06-25 PROCEDURE — 85610 PROTHROMBIN TIME: CPT | Performed by: PHYSICIAN ASSISTANT

## 2024-06-25 PROCEDURE — 86901 BLOOD TYPING SEROLOGIC RH(D): CPT | Performed by: PHYSICIAN ASSISTANT

## 2024-06-25 PROCEDURE — 72131 CT LUMBAR SPINE W/O DYE: CPT

## 2024-06-25 PROCEDURE — 85730 THROMBOPLASTIN TIME PARTIAL: CPT | Performed by: PHYSICIAN ASSISTANT

## 2024-06-25 PROCEDURE — 84484 ASSAY OF TROPONIN QUANT: CPT | Performed by: PHYSICIAN ASSISTANT

## 2024-06-25 PROCEDURE — 93005 ELECTROCARDIOGRAM TRACING: CPT

## 2024-06-25 PROCEDURE — 36415 COLL VENOUS BLD VENIPUNCTURE: CPT | Performed by: PHYSICIAN ASSISTANT

## 2024-06-25 PROCEDURE — 72125 CT NECK SPINE W/O DYE: CPT

## 2024-06-25 PROCEDURE — 85025 COMPLETE CBC W/AUTO DIFF WBC: CPT | Performed by: PHYSICIAN ASSISTANT

## 2024-06-25 PROCEDURE — 99285 EMERGENCY DEPT VISIT HI MDM: CPT | Performed by: PHYSICIAN ASSISTANT

## 2024-06-25 PROCEDURE — 72220 X-RAY EXAM SACRUM TAILBONE: CPT

## 2024-06-25 PROCEDURE — 86900 BLOOD TYPING SEROLOGIC ABO: CPT | Performed by: PHYSICIAN ASSISTANT

## 2024-06-25 NOTE — ED PROVIDER NOTES
Emergency Department Trauma Note  Ayleen Moralez 63 y.o. female MRN: [id number]  Unit/Bed#: ED 04/ED 04 Encounter: [id number]      Trauma Alert: Trauma Acuity: Trauma Evaluation  Model of Arrival: Mode of Arrival: Direct from scene via    Trauma Team: Current Providers  Attending Provider: Josemanuel Pradhan MD  Physician Assistant: Maritza Roman PA-C  Registered Nurse: Mary Anne Leary  Consultants:     None      History of Present Illness     Chief Complaint:   Chief Complaint   Patient presents with    Fall     Pt reports tripping and falling on Saturday around 5pm down approximately 5 steps. Reports back pain. Thinks she may have hit her head. Denies loc, takes aspirin daily.      HPI:  Ayleen Moralez is a 63 y.o. female who presents with low back pain after a fall 3 days ago.  Mechanism:Details of Incident: pt repotrs tripping and falling down 5 steps on saturday night. possible head strike, denies loc, complaints of lower back psin Injury Date: 06/23/24 Injury Time: 1700      Patient is a 64 y/o F with h/o HTN, Bipolar d/o, chronic back pain that presents to the ED with low back pain after a fall down 4-5 carpeted steps 3 days ago.  She states she was walking down the steps with towels and stepped on the towel and tumbled forward.  She did hit her head, but denies LOC.  She does take aspirin daily.  She is alert and oriented x 3.  She has multiple bruises on her.  She has pain lower back and neck.  No numbness or weakness.  No bowel/bladder dysfunction.  She is able to to walk without difficulty.       History provided by:  Patient  Fall  Associated symptoms: back pain and neck pain    Associated symptoms: no chest pain and no headaches      Review of Systems   Constitutional:  Negative for chills and fever.   Eyes:  Negative for visual disturbance.   Respiratory:  Negative for cough.    Cardiovascular:  Negative for chest pain.   Musculoskeletal:  Positive for back pain and neck pain.   Skin:   Negative for wound.   Neurological:  Negative for dizziness, weakness, numbness and headaches.   Psychiatric/Behavioral:  Negative for confusion.    All other systems reviewed and are negative.      Historical Information     Immunizations:   Immunization History   Administered Date(s) Administered    COVID-19 PFIZER VACCINE 0.3 ML IM 05/05/2021, 05/29/2021, 01/21/2022    Influenza, recombinant, quadrivalent,injectable, preservative free 01/02/2019, 10/02/2019, 01/03/2022, 10/10/2022    Pneumococcal Conjugate 13-Valent 08/14/2019    Pneumococcal Polysaccharide PPV23 01/31/2022    Tdap 08/14/2019       Past Medical History:   Diagnosis Date    Anxiety     Anxiety disorder     Arthritis     At risk for falls     Bipolar 2 disorder (HCC)     Chronic back pain     Chronic kidney disease     Closed fracture of distal end of right fibula with routine healing 11/04/2020    COVID-19     in Jan 2021    CVA (cerebral vascular accident) ([facility])     noted on MRI in the past    Depression     GERD (gastroesophageal reflux disease)     Hypercholesteremia     Hypernatremia     Hypertension     Hypokalemia     Idiopathic chronic pancreatitis (HCC) 03/20/2018    Intervertebral disc disorder with radiculopathy of lumbosacral region     resolved: 12/28/2015    Kidney disease     Kidney disease     Limb alert care status     LUE-fistula    Panic attacks     Pericardial effusion     PONV (postoperative nausea and vomiting)     Psychiatric problem     Radiculitis     resolved: 12/28/2015    Secondary renal hyperparathyroidism (HCC)     Stroke (HCC)     Vitamin D deficiency        Family History   Problem Relation Age of Onset    Bipolar disorder Mother     Mental illness Mother         depression    Stroke Mother     Dementia Mother     Colon polyps Mother     Heart disease Father     Hypertension Father     Diabetes Father     Other Family         Back disorder    Diabetes Family     Heart disease Family     Hypertension Family     Stroke  Family     Thyroid disease Family     Breast cancer Paternal Grandmother         age unknown    Breast cancer Paternal Aunt         age unknown    Breast cancer Maternal Aunt         age unknown    Mental illness Sister     Colon polyps Sister     Mental illness Sister     Heart disease Sister     No Known Problems Sister     Breast cancer Sister 68    Other Son         pituitary tumor    Hypertension Son     Obesity Son     No Known Problems Son     No Known Problems Maternal Grandmother     No Known Problems Maternal Grandfather     No Known Problems Paternal Grandfather     Breast cancer Paternal Aunt         age unknown    Substance Abuse Neg Hx         neg fam hx    Colon cancer Neg Hx      Past Surgical History:   Procedure Laterality Date    BUNIONECTOMY      Left foot     CAST APPLICATION Right 2022    Procedure: Application short-arm thumb spica splint;  Surgeon: Lyndon Victoria MD;  Location: [facility];  Service: Orthopedics    COLON SURGERY      COLONOSCOPY  2021    DILATION AND CURETTAGE OF UTERUS      INDUCED       surgically induced    NE ARTERIOVENOUS ANASTOMOSIS OPEN DIRECT Left 2019    Procedure: CREATION FISTULA ARTERIOVENOUS (AV) left wrists possible left upper;  Surgeon: Andrey Quintero MD;  Location: [facility];  Service: Vascular    NE ARTHRP KNE CONDYLE&PLATU MEDIAL&LAT COMPARTMENTS Left 2024    Procedure: ARTHROPLASTY KNEE TOTAL SAME DAY;  Surgeon: Riki Ma MD;  Location: [facility];  Service: Orthopedics    NE CORRJ HLX VLGS BNCTY SESMDC DSTL METAR OSTEOT Left 2019    Procedure: Serge bunionectomy;  Surgeon: Munir Larkin DPM;  Location[facility];  Service: Podiatry    NE CORRJ HLX VLGS BNCTY SESMDC DSTL METAR OSTEOT Right 8/3/2020    Procedure: BUNIONECTOMY RAFAEL;  Surgeon: Munir Larkin DPM;  Location: [facility];  Service: Podiatry    NE CORRJ HLX VLGS BNCTY SESMDC PROX PHLX OSTEOT Right 2021    Procedure: BUNIONECTOMY TAMEKA, right tameka  osteotomy and 2nd claw toe correction;  Surgeon: James R Lachman, MD;  Location: [facility];  Service: Orthopedics    NY ERCP DX COLLECTION SPECIMEN BRUSHING/WASHING N/A 4/11/2018    Procedure: ENDOSCOPIC RETROGRADE CHOLANGIOPANCREATOGRAPHY (ERCP);  Surgeon: Alfredo Messina MD;  Location: [facility];  Service: Gastroenterology    NY LAPAROSCOPY PROCTOPEXY PROLAPSE N/A 7/13/2018    Procedure: ROBOTIC SIGMOID RESECTION / RECTOPEXY;  Surgeon: ELPIDIO Mcnulty MD;  [facility]: [facility];  Service: Colorectal    NY OPEN TREATMENT RADIAL SHAFT FRACTURE Right 10/11/2021    Procedure: OPEN REDUCTION W/ INTERNAL FIXATION (ORIF) RADIUS (WRIST), RIGHT DISTAL;  Surgeon: Riki Ma MD;  Location: [facility];  Service: Orthopedics    NY REMOVAL IMPLANT DEEP Right 6/23/2022    Procedure: Removal of hardware volar aspect right distal radius (distal radial plate and screws);  Surgeon: Lyndon Victoria MD;  Location: [facility];  Service: Orthopedics    NY SIGMOIDOSCOPY FLX DX W/COLLJ SPEC BR/WA IF PFRMD N/A 7/13/2018    Procedure: SIGMOIDOSCOPY FLEXIBLE;  Surgeon: ELPIDIO Mcnulty MD;  [facility];  Service: Colorectal    NY TR TDN RESTORE INTRNSC FUNCJ ALL 4 FNGRS Right 6/23/2022    Procedure: Right ring finger flexor digitorum superficialis to flexor pollicis longus tendon transfer;  Surgeon: Lyndon Victoria MD;  Location: [facility];  Service: Orthopedics    TUBAL LIGATION Bilateral 1997    US GUIDED THYROID BIOPSY  7/30/2019     Social History     Tobacco Use    Smoking status: Never    Smokeless tobacco: Never   Vaping Use    Vaping status: Never Used   Substance Use Topics    Alcohol use: Not Currently    Drug use: Not Currently     Types: Hydrocodone, Marijuana     Comment: JESSE Abbasi has h/o marijuana abuse- not currently     E-Cigarette/Vaping    E-Cigarette Use Never User      E-Cigarette/Vaping Substances    Nicotine No     THC No     CBD No     Flavoring No     Other No     Unknown No         Family History: non-contributory    Meds/Allergies   Prior to Admission Medications   Prescriptions Last Dose Informant Patient Reported? Taking?   AMILoride 5 mg tablet  Self No No   Sig: Take 1 tablet (5 mg total) by mouth 2 (two) times a day   Patient taking differently: Take 5 mg by mouth 2 (two) times a day   Cyanocobalamin (VITAMIN B 12 PO)  Self Yes No   Sig: Take 1,000 mcg by mouth in the morning   Multiple Vitamin (MULTI-VITAMIN) tablet  Self No No   Sig: Take 1 tablet by mouth daily   QUEtiapine (SEROquel) 400 MG tablet   No No   Sig: Quetiapine Fumarate 400m tablets at night   acetaminophen (TYLENOL) 500 mg tablet  Self No No   Sig: Take 2 tablets (1,000 mg total) by mouth every 8 (eight) hours   albuterol (Ventolin HFA) 90 mcg/act inhaler  Self No No   Sig: Inhale 2 puffs every 6 (six) hours as needed for wheezing or shortness of breath   alendronate (Fosamax) 70 mg tablet   No No   Sig: Take 1 tablet (70 mg total) by mouth every 7 days   amLODIPine (NORVASC) 5 mg tablet   No No   Sig: Take 1 tablet (5 mg total) by mouth daily   ascorbic acid (VITAMIN C) 500 MG tablet  Self No No   Sig: Take 1 tablet (500 mg total) by mouth 2 (two) times a day   aspirin 81 mg chewable tablet  Self No No   Sig: Chew 1 tablet (81 mg total) 2 (two) times a day   benztropine (COGENTIN) 1 mg tablet   No No   Sig: Benztropine Mesylate 1 m tablet in the morning and 1 tablet at night   budesonide-formoterol (Symbicort) 160-4.5 mcg/act inhaler  Self No No   Sig: Inhale 2 puffs 2 (two) times a day Rinse mouth after use.   carvedilol (COREG) 25 mg tablet  Self No No   Sig: Take 1 tablet (25 mg total) by mouth 2 (two) times a day with meals   cholecalciferol (VITAMIN D3) 1,000 units tablet  Self No No   Sig: Take 5 tablets (5,000 Units total) by mouth daily   clonazePAM (KlonoPIN) 0.5 mg tablet   No No   Sig: Take 1 tablet (0.5 mg total) by mouth 3 (three) times a day   ferrous sulfate 324 (65 Fe) mg  Self No No   Sig:  Take 1 tablet (324 mg total) by mouth 2 (two) times a day before meals   fluvoxaMINE (LUVOX) 100 mg tablet  Self No No   Sig: Fluvoxamine 100m tablet in the morning ; 2 tablets at night   Patient taking differently: Fluvoxamine 300 mg  in the morning ;   folic acid (FOLVITE) 1 mg tablet  Self No No   Sig: Take 1 tablet (1 mg total) by mouth daily   Patient not taking: Reported on 2024   lamoTRIgine (LaMICtal) 200 MG tablet   No No   Sig: Lamotrigine 200m tablet in the morning , 1 tablet at night   omeprazole (PriLOSEC) 40 MG capsule   No No   Sig: TAKE 1 CAPSULE BY MOUTH DAILY  BEFORE BREAKFAST   ondansetron (ZOFRAN) 4 mg tablet   No No   Sig: Take 1 tablet (4 mg total) by mouth every 8 (eight) hours as needed for nausea or vomiting   oxyCODONE (Roxicodone) 5 immediate release tablet  Self No No   Sig: Take 1 tablet (5 mg total) by mouth every 4 (four) hours as needed for severe pain Continue current therapy. Max Daily Amount: 30 mg   rosuvastatin (CRESTOR) 20 MG tablet  Self No No   Sig: Take 1 tablet (20 mg total) by mouth every evening   traMADol (Ultram) 50 mg tablet   No No   Sig: Take 1 tablet (50 mg total) by mouth every 12 (twelve) hours as needed for moderate pain      Facility-Administered Medications: None       Allergies   Allergen Reactions    Molds & Smuts Nasal Congestion    Bee Pollen Nasal Congestion     Other reaction(s): Nasal Congestion    Pollen Extract Nasal Congestion       PHYSICAL EXAM    PE limited by: nothing    Objective   Vitals:   First set: Temperature: 97.6 °F (36.4 °C) (24 1025)  Pulse: 71 (24 1025)  Respirations: 18 (24 1025)  Blood Pressure: 145/82 (24 1025)  SpO2: 97 % (24 1025)    Primary Survey:   (A) Airway: patent  (B) Breathing: normal  (C) Circulation: Pulses:   normal  (D) Disabliity:  GCS Total:  15  (E) Expose:  Completed    Secondary Survey: (Click on Physical Exam tab above)  Physical Exam  Vitals and nursing note reviewed.    Constitutional:       General: She is not in acute distress.     Appearance: She is well-developed and well-groomed. She is not ill-appearing.   HENT:      Head: Normocephalic and atraumatic.      Right Ear: Hearing normal.      Left Ear: Hearing normal.      Nose: Nose normal.      Mouth/Throat:      Mouth: Mucous membranes are moist.   Eyes:      Conjunctiva/sclera: Conjunctivae normal.      Pupils: Pupils are equal.   Cardiovascular:      Rate and Rhythm: Normal rate and regular rhythm.      Heart sounds: Normal heart sounds.   Pulmonary:      Effort: Pulmonary effort is normal.      Breath sounds: Normal breath sounds. No wheezing, rhonchi or rales.   Chest:      Chest wall: No deformity, swelling or tenderness.   Abdominal:      General: Abdomen is flat. Bowel sounds are normal.      Palpations: Abdomen is soft.      Tenderness: There is no abdominal tenderness.   Musculoskeletal:      Cervical back: Normal and normal range of motion. Muscular tenderness present. No spinous process tenderness.      Thoracic back: Normal.      Lumbar back: Tenderness and bony tenderness present. No swelling. Negative right straight leg raise test and negative left straight leg raise test.      Comments: Large bruise to lower back.   B/L UE FROM, nontender to palpation.  B/L LE FROM, nontender to palpation.    Skin:     General: Skin is warm and dry.      Coloration: Skin is pale.      Findings: Bruising (right forearm and upper arm, left knee, lower back.) present.   Neurological:      Mental Status: She is alert and oriented to person, place, and time.      GCS: GCS eye subscore is 4. GCS verbal subscore is 5. GCS motor subscore is 6.      Sensory: Sensation is intact.      Motor: Motor function is intact.   Psychiatric:         Behavior: Behavior is cooperative.         Cervical spine cleared by clinical criteria? No (imaging required)      Invasive Devices       Line  Duration             Hemodialysis AV Fistula 11/18/19  Left  1681 days    Hemodialysis AV Fistula 11/10/23 Left Forearm 227 days                    Lab Results:   Results Reviewed       Procedure Component Value Units Date/Time    HS Troponin 0hr (reflex protocol) [[id number]]  (Normal) Collected: 06/25/24 1041    Lab Status: Final result Specimen: Blood from Arm, Right Updated: 06/25/24 1109     hs TnI 0hr 6 ng/L     Comprehensive metabolic panel [[id number]]  (Abnormal) Collected: 06/25/24 1041    Lab Status: Final result Specimen: Blood from Arm, Right Updated: 06/25/24 1102     Sodium 137 mmol/L      Potassium 4.9 mmol/L      Chloride 107 mmol/L      CO2 23 mmol/L      ANION GAP 7 mmol/L      BUN 30 mg/dL      Creatinine 2.58 mg/dL      Glucose 107 mg/dL      Calcium 9.4 mg/dL      AST 20 U/L      ALT 17 U/L      Alkaline Phosphatase 124 U/L      Total Protein 6.8 g/dL      Albumin 4.0 g/dL      Total Bilirubin 0.33 mg/dL      eGFR 19 ml/min/1.73sq m     Narrative:      National Kid[employer] Disease Foundation guidelines for Chronic Kidney Disease (CKD):     Stage 1 with normal or high GFR (GFR > 90 mL/min/1.73 square meters)    Stage 2 Mild CKD (GFR = 60-89 mL/min/1.73 square meters)    Stage 3A Moderate CKD (GFR = 45-59 mL/min/1.73 square meters)    Stage 3B Moderate CKD (GFR = 30-44 mL/min/1.73 square meters)    Stage 4 Severe CKD (GFR = 15-29 mL/min/1.73 square meters)    Stage 5 End Stage CKD (GFR <15 mL/min/1.73 square meters)  Note: GFR calculation is accurate only with a steady state creatinine    Protime-INR [[id number]]  (Normal) Collected: 06/25/24 1041    Lab Status: Final result Specimen: Blood from Arm, Right Updated: 06/25/24 1100     Protime 13.4 seconds      INR 0.98    APTT [[id number]]  (Normal) Collected: 06/25/24 1041    Lab Status: Final result Specimen: Blood from Arm, Right Updated: 06/25/24 1100     PTT 29 seconds     CBC and differential [[id number]]  (Abnormal) Collected: 06/25/24 1041    Lab Status: Final result Specimen: Blood from Arm, Right  Updated: 06/25/24 1046     WBC 6.54 Thousand/uL      RBC 3.21 Million/uL      Hemoglobin 10.1 g/dL      Hematocrit 33.4 %       fL      MCH 31.5 pg      MCHC 30.2 g/dL      RDW 13.2 %      MPV 10.2 fL      Platelets 192 Thousands/uL      nRBC 0 /100 WBCs      Segmented % 65 %      Immature Grans % 1 %      Lymphocytes % 22 %      Monocytes % 9 %      Eosinophils Relative 2 %      Basophils Relative 1 %      Absolute Neutrophils 4.28 Thousands/µL      Absolute Immature Grans 0.04 Thousand/uL      Absolute Lymphocytes 1.43 Thousands/µL      Absolute Monocytes 0.58 Thousand/µL      Eosinophils Absolute 0.16 Thousand/µL      Basophils Absolute 0.05 Thousands/µL                    Imaging Studies:   Direct to CT: No  TRAUMA - CT head wo contrast   Final Result by Wilfredo Martinez MD (06/25 1136)      No acute intracranial abnormality.      The study was marked in EPIC for immediate notification.            Workstation performed: [id number]         TRAUMA - CT spine cervical wo contrast   Final Result by Wilfredo Martinez MD (06/25 1141)      No cervical spine fracture or traumatic malalignment.      The study was marked in EPIC for immediate notification.            Workstation performed: [id number]         TRAUMA - CT spine lumbar wo contrast   Final Result by Wilfredo Martinez MD (06/25 1152)      No acute fracture of the lumbar spine.   Incompletely visualized is density within the subcutaneous tissues superficial to the sacrum suspicious for a hematoma. Clinical correlation is advised.      The study was marked in EPIC for immediate notification.      Workstation performed: [id number]         XR Trauma chest portable   Final Result by Bharti Lugo MD (06/25 1129)      No acute cardiopulmonary disease.            Workstation performed: [id number]         XR Trauma pelvis ap only 1 or 2 vw   Final Result by Wilfredo Martinez MD (06/25 1153)      No acute osseous abnormality.          Computerized Assisted Algorithm (CAA) may have been used to analyze all applicable images.      Workstation performed: [id number]         XR sacrum and coccyx    (Results Pending)         Procedures  ECG 12 Lead Documentation Only    Date/Time: 6/25/2024 11:10 AM    Performed by: Maritza Roman PA-C  Authorized by: Maritza Roman PA-C    Indications / Diagnosis:  Fall  ECG reviewed by me, the ED Provider: yes    Patient location:  ED  Previous ECG:     Previous ECG:  Compared to current    Similarity:  No change  Quality:     Tracing quality:  Limited by artifact  Rate:     ECG rate:  71  Rhythm:     Rhythm: sinus rhythm    Conduction:     Conduction: abnormal      Abnormal conduction: complete RBBB    ST segments:     ST segments:  Normal           ED Course           Medical Decision Making  Patient with low back pain after a fall, will order CT scan to r/o lumbar fracture.  Hematoma to left buttocks.  Patient with head injury, on aspirin, will order CT scan to r/o hemorrhage.  Cervical spine tenderness, will order xray to r/o fracture.  No acute abnormalities on imaging, except for hematoma, patient instructed to f/u with PCP for recheck and to return to ER if hematoma enlarges.  Patient able to ambulate without difficulty.      Amount and/or Complexity of Data Reviewed  External Data Reviewed: labs.  Labs: ordered.  Radiology: ordered.  ECG/medicine tests: ordered and independent interpretation performed.                Disposition  [facility] Transfer: No  Final diagnoses:   Fall   Head injury   Cervical strain, acute   Thyroid nodule   Contusion, back   Hematoma - left buttocks     Time reflects when diagnosis was documented in both MDM as applicable and the Disposition within this note       Time User Action Codes Description Comment    6/25/2024 11:21 AM Maritza Roman Add [W19.XXXA] Fall     6/25/2024 11:21 AM Maritza Roman Add [S09.90XA] Head injury     6/25/2024 11:44  AM Maritza Roman Add [S16.1XXA] Cervical strain, acute     2024 11:44 AM Maritza Roman Add [E04.1] Thyroid nodule     2024 11:59 AM Maritza Roman Add [S20.229A] Contusion, back     2024 12:07 PM Maritza Roman Add [T14.8XXA] Hematoma     2024 12:07 PM Maritza Roman Modify [T14.8XXA] Hematoma left buttocks          ED Disposition       ED Disposition   Discharge    Condition   Stable    Date/Time    12:07 PM    Comment   Ayleen Moralez discharge to home/self care.                   Follow-up Information       Follow up With Specialties Details Why Contact Merlene Harp DO Internal Medicine Schedule an appointment as soon as possible for a visit in 1 week For recheck [address] [place] [address]  [phone]            Discharge Medication List as of 2024 12:10 PM        CONTINUE these medications which have NOT CHANGED    Details   acetaminophen (TYLENOL) 500 mg tablet Take 2 tablets (1,000 mg total) by mouth every 8 (eight) hours, Starting 2024, Normal      albuterol (Ventolin HFA) 90 mcg/act inhaler Inhale 2 puffs every 6 (six) hours as needed for wheezing or shortness of breath, Starting 2023, Normal      alendronate (Fosamax) 70 mg tablet Take 1 tablet (70 mg total) by mouth every 7 days, Starting 2024, Normal      AMILoride 5 mg tablet Take 1 tablet (5 mg total) by mouth 2 (two) times a day, Starting 2023, Normal      amLODIPine (NORVASC) 5 mg tablet Take 1 tablet (5 mg total) by mouth daily, Starting 2024, Normal      ascorbic acid (VITAMIN C) 500 MG tablet Take 1 tablet (500 mg total) by mouth 2 (two) times a day, Starting 2024, Normal      aspirin 81 mg chewable tablet Chew 1 tablet (81 mg total) 2 (two) times a day, Starting 2024, Normal      benztropine (COGENTIN) 1 mg tablet Benztropine Mesylate 1 m tablet in the morning and 1 tablet at night,  Normal      budesonide-formoterol (Symbicort) 160-4.5 mcg/act inhaler Inhale 2 puffs 2 (two) times a day Rinse mouth after use., Starting Thu 8/10/2023, Normal      carvedilol (COREG) 25 mg tablet Take 1 tablet (25 mg total) by mouth 2 (two) times a day with meals, Starting 2023, Normal      cholecalciferol (VITAMIN D3) 1,000 units tablet Take 5 tablets (5,000 Units total) by mouth daily, Starting 2022, Normal      clonazePAM (KlonoPIN) 0.5 mg tablet Take 1 tablet (0.5 mg total) by mouth 3 (three) times a day, Starting 2024, Until 2024, Normal      Cyanocobalamin (VITAMIN B 12 PO) Take 1,000 mcg by mouth in the morning, Historical Med      ferrous sulfate 324 (65 Fe) mg Take 1 tablet (324 mg total) by mouth 2 (two) times a day before meals, Starting 2024, Normal      fluvoxaMINE (LUVOX) 100 mg tablet Fluvoxamine 100m tablet in the morning ; 2 tablets at night, Normal      folic acid (FOLVITE) 1 mg tablet Take 1 tablet (1 mg total) by mouth daily, Starting 2024, Normal      lamoTRIgine (LaMICtal) 200 MG tablet Lamotrigine 200m tablet in the morning , 1 tablet at night, Normal      Multiple Vitamin (MULTI-VITAMIN) tablet Take 1 tablet by mouth daily, Starting 2024, Normal      omeprazole (PriLOSEC) 40 MG capsule TAKE 1 CAPSULE BY MOUTH DAILY  BEFORE BREAKFAST, Starting 2024, Normal      ondansetron (ZOFRAN) 4 mg tablet Take 1 tablet (4 mg total) by mouth every 8 (eight) hours as needed for nausea or vomiting, Starting Sat 2024, Normal      oxyCODONE (Roxicodone) 5 immediate release tablet Take 1 tablet (5 mg total) by mouth every 4 (four) hours as needed for severe pain Continue current therapy. Max Daily Amount: 30 mg, Starting Thu 2/15/2024, Normal      QUEtiapine (SEROquel) 400 MG tablet Quetiapine Fumarate 400m tablets at night, Normal      rosuvastatin (CRESTOR) 20 MG tablet Take 1 tablet (20 mg total) by mouth every evening,  Starting Tue 1/30/2024, Normal      traMADol (Ultram) 50 mg tablet Take 1 tablet (50 mg total) by mouth every 12 (twelve) hours as needed for moderate pain, Starting Tue 6/4/2024, Normal           No discharge procedures on file.    PDMP Review         Value Time User    PDMP Reviewed  Yes 6/25/2024 11:31 AM Maritza Roman PA-C            ED Provider  Electronically Signed by           Maritza Roman PA-C  06/25/24 1249       Maritza Roman PA-C  06/25/24 [id number]

## 2024-06-25 NOTE — DISCHARGE INSTRUCTIONS
Rest, ice to back.  Tylenol for discomfort.  Follow up with PCP in 1 week for recheck.  Stool softener to prevent constipation.

## 2024-06-25 NOTE — TELEPHONE ENCOUNTER
Pod --harsh--put call through to us.  Pt fell this past Sat 6/22 at home, landed on her buttocks.  Has pain in tailbone area.  Hard to sit and walk.  No appts.  Suggested pt go to urg care or er.  Pt wants to go to ER.      Clear

## 2024-06-26 ENCOUNTER — VBI (OUTPATIENT)
Dept: FAMILY MEDICINE CLINIC | Facility: HOSPITAL | Age: 64
End: 2024-06-26

## 2024-06-26 NOTE — TELEPHONE ENCOUNTER
06/26/24 8:40 AM    Patient contacted post ED visit, [facility] department spoke with patient/caregiver and outreach was successful.    Thank you.  Cherry Lyons  [facility]

## 2024-06-27 ENCOUNTER — EVALUATION (OUTPATIENT)
Dept: PHYSICAL THERAPY | Facility: CLINIC | Age: 64
End: 2024-06-27
Payer: MEDICARE

## 2024-06-27 DIAGNOSIS — Z47.1 AFTERCARE FOLLOWING LEFT KNEE JOINT REPLACEMENT SURGERY: ICD-10-CM

## 2024-06-27 DIAGNOSIS — Z96.652 AFTERCARE FOLLOWING LEFT KNEE JOINT REPLACEMENT SURGERY: ICD-10-CM

## 2024-06-27 LAB
ATRIAL RATE: 71 BPM
P AXIS: 55 DEGREES
PR INTERVAL: 150 MS
QRS AXIS: 129 DEGREES
QRSD INTERVAL: 140 MS
QT INTERVAL: 422 MS
QTC INTERVAL: 458 MS
T WAVE AXIS: 24 DEGREES
VENTRICULAR RATE: 71 BPM

## 2024-06-27 PROCEDURE — 93010 ELECTROCARDIOGRAM REPORT: CPT | Performed by: INTERNAL MEDICINE

## 2024-06-27 PROCEDURE — 97530 THERAPEUTIC ACTIVITIES: CPT | Performed by: PHYSICAL THERAPIST

## 2024-06-27 PROCEDURE — 97161 PT EVAL LOW COMPLEX 20 MIN: CPT | Performed by: PHYSICAL THERAPIST

## 2024-06-27 PROCEDURE — 97110 THERAPEUTIC EXERCISES: CPT | Performed by: PHYSICAL THERAPIST

## 2024-06-27 NOTE — PROGRESS NOTES
PT Evaluation     Today's date: 2024  Patient name: Ayleen Moralez  : 1960  MRN: [id number]  Referring provider: Yari Wallace PA-C  Dx:   Encounter Diagnosis     ICD-10-CM     Aftercare following Left knee joint replacement surgery Z47.1  Z96.652                      Assessment  Assessment details: Ayleen Moralez is a 62 y.o. female presenting to outpatient physical therapy at [facility] with complaints of L knee pain, stiffness and weakness.  She presents with decreased L knee range of motion, decreased strength, limited flexibility, poor postural awareness, poor body mechanics, altered gait pattern, poor balance, decreased tolerance to activity and decreased functional mobility due to Primary osteoarthritis of left knee  Lumbar radiculopathy.  She would benefit from skilled PT services in order to address these deficits and reach maximum level of function.  Thank you for the referral!    Impairments: abnormal or restricted ROM, activity intolerance, impaired balance, impaired physical strength, lacks appropriate home exercise program and pain with function    Symptom irritability: moderateUnderstanding of Dx/Px/POC: good   Prognosis: good    Goals  STG  1. Independent with HEP in 4 weeks  2. Decrease pain at worst by 50% in 4 weeks    LTG  1. Increase LLE strength in all planes to 5/5 in 8 weeks  2. Return to full, unrestricted stair & step negotiation in 8 weeks    Plan  Patient would benefit from: skilled physical therapy  Planned modality interventions: thermotherapy: hydrocollator packs  Planned therapy interventions: manual therapy, neuromuscular re-education, therapeutic activities, therapeutic exercise and home exercise program  Frequency: 2x week  Duration in weeks: 8  Treatment plan discussed with: patient        Subjective Evaluation    History of Present Illness  Date of onset: 2024  Surgery date: 2024  Quality of life: good    Patient Goals  Patient goals for therapy:  "decreased pain and increased strength  Patient goal: painfree stair & step negotiation   Pain  Current pain ratin  At best pain ratin  At worst pain rating: 10  Location: posteriormedial L knee  Quality: sharp      Diagnostic Tests  X-ray: abnormal (Moderate osteoarthritis with narrowing of the medial tibiofemoral joint and small osteophytes seen.)  Treatments  Previous treatment: injection treatment and medication        Objective     Active Range of Motion   Left Knee   Flexion: 115 degrees with pain  Extension: 5 degrees with pain    Right Knee   Normal active range of motion    Strength/Myotome Testing     Left Knee   Flexion: 4-  Extension: 4-  Quadriceps contraction: fair    Right Knee   Normal strength    Tests     Left Knee   Negative anterior drawer, lateral Kateryna, medial Kateryna, posterior drawer, posterior Lachman, Thessaly's test at 5 degrees and Thessaly's test at 20 degrees.     L TKA  2024    EPOC: 2024    Manuals             L Knee PROM             Gentle L tibiofemoral mobs                                       Neuro Re-Ed             L quad set Seated x10            HS curl w/swiss ball x25            Standing hip ext & abd X25 ea            SAQ iso             LAQ iso x25 3\"            Reverse squat slant                          Ther Ex             HR x25            TR x25            Slant board 10x10\"            Clam shell supine                                                     L Knee TERT with heat Into EXT             Ther Activity             LE Bike for improved L knee ROM             Pt education: pathoanatomy, nature of sxs, POC, HEP  NS            Gait Training                                       Modalities                                          "

## 2024-07-02 ENCOUNTER — NURSE TRIAGE (OUTPATIENT)
Age: 64
End: 2024-07-02

## 2024-07-02 ENCOUNTER — OFFICE VISIT (OUTPATIENT)
Dept: FAMILY MEDICINE CLINIC | Facility: HOSPITAL | Age: 64
End: 2024-07-02
Payer: MEDICARE

## 2024-07-02 ENCOUNTER — OFFICE VISIT (OUTPATIENT)
Dept: PHYSICAL THERAPY | Facility: CLINIC | Age: 64
End: 2024-07-02
Payer: MEDICARE

## 2024-07-02 VITALS
WEIGHT: 202 LBS | TEMPERATURE: 99.6 F | OXYGEN SATURATION: 97 % | RESPIRATION RATE: 16 BRPM | SYSTOLIC BLOOD PRESSURE: 150 MMHG | DIASTOLIC BLOOD PRESSURE: 82 MMHG | HEART RATE: 75 BPM | BODY MASS INDEX: 34.49 KG/M2 | HEIGHT: 64 IN

## 2024-07-02 DIAGNOSIS — Z47.1 AFTERCARE FOLLOWING LEFT KNEE JOINT REPLACEMENT SURGERY: Primary | ICD-10-CM

## 2024-07-02 DIAGNOSIS — Z96.652 AFTERCARE FOLLOWING LEFT KNEE JOINT REPLACEMENT SURGERY: Primary | ICD-10-CM

## 2024-07-02 DIAGNOSIS — S30.0XXD CONTUSION OF BUTTOCK, SUBSEQUENT ENCOUNTER: Primary | ICD-10-CM

## 2024-07-02 DIAGNOSIS — N18.4 CKD (CHRONIC KIDNEY DISEASE) STAGE 4, GFR 15-29 ML/MIN (HCC): ICD-10-CM

## 2024-07-02 DIAGNOSIS — M79.605 BILATERAL LEG PAIN: ICD-10-CM

## 2024-07-02 DIAGNOSIS — M79.604 BILATERAL LEG PAIN: ICD-10-CM

## 2024-07-02 DIAGNOSIS — M54.50 LUMBAR PAIN: ICD-10-CM

## 2024-07-02 PROBLEM — R11.0 NAUSEA: Status: RESOLVED | Noted: 2021-12-14 | Resolved: 2024-07-02

## 2024-07-02 PROBLEM — S30.0XXA CONTUSION OF BUTTOCK: Status: ACTIVE | Noted: 2024-07-02

## 2024-07-02 PROBLEM — M79.89 LEG SWELLING: Status: RESOLVED | Noted: 2023-07-11 | Resolved: 2024-07-02

## 2024-07-02 PROBLEM — R06.02 SHORTNESS OF BREATH: Status: RESOLVED | Noted: 2023-07-11 | Resolved: 2024-07-02

## 2024-07-02 PROCEDURE — 97110 THERAPEUTIC EXERCISES: CPT | Performed by: PHYSICAL THERAPIST

## 2024-07-02 PROCEDURE — 97112 NEUROMUSCULAR REEDUCATION: CPT | Performed by: PHYSICAL THERAPIST

## 2024-07-02 PROCEDURE — 99213 OFFICE O/P EST LOW 20 MIN: CPT | Performed by: INTERNAL MEDICINE

## 2024-07-02 PROCEDURE — G2211 COMPLEX E/M VISIT ADD ON: HCPCS | Performed by: INTERNAL MEDICINE

## 2024-07-02 PROCEDURE — 97530 THERAPEUTIC ACTIVITIES: CPT | Performed by: PHYSICAL THERAPIST

## 2024-07-02 RX ORDER — TRAMADOL HYDROCHLORIDE 50 MG/1
50 TABLET ORAL EVERY 12 HOURS PRN
Qty: 60 TABLET | Refills: 0 | Status: SHIPPED | OUTPATIENT
Start: 2024-07-02

## 2024-07-02 NOTE — PATIENT INSTRUCTIONS
Apply heating pads or ice packs to the left buttocks for 20-30 minutes 5 times a day.  You can try applying Voltaren gel 4 times a day to the left buttocks.  Alternatively try IcyHot or Bengay cream 4 times a day.

## 2024-07-02 NOTE — TELEPHONE ENCOUNTER
"Patient called to report she went to Physical therapy today and now has sharp pain and swelling in lower back. She was seen in the ED 6/25/24 for a fall. She reports taking ASA daily. No further information obtained. Patient is coming directly to [facility] 7/2/24 3 pm.    Reason for Disposition   Patient wants to be seen    Answer Assessment - Initial Assessment Questions  1. ONSET: \"When did the pain begin?\"       Today after therapy - having sharp pain now  2. LOCATION: \"Where does it hurt?\" (upper, mid or lower back)      Low back  3. SEVERITY: \"How bad is the pain?\"  (e.g., Scale 1-10; mild, moderate, or severe)    - MILD (1-3): doesn't interfere with normal activities     - MODERATE (4-7): interferes with normal activities or awakens from sleep     - SEVERE (8-10): excruciating pain, unable to do any normal activities       moderate  4. PATTERN: \"Is the pain constant?\" (e.g., yes, no; constant, intermittent)       constant  5. RADIATION: \"Does the pain shoot into your legs or elsewhere?\"      no  6. CAUSE:  \"What do you think is causing the back pain?\"       Had a fall, now hurts worse after therapy  7. BACK OVERUSE:  \"Any recent lifting of heavy objects, strenuous work or exercise?\"      Physical therapy  8. MEDICATIONS: \"What have you taken so far for the pain?\" (e.g., nothing, acetaminophen, NSAIDS)      Tramadol   9. NEUROLOGIC SYMPTOMS: \"Do you have any weakness, numbness, or problems with bowel/bladder control?\"      no  10. OTHER SYMPTOMS: \"Do you have any other symptoms?\" (e.g., fever, abdominal pain, burning with urination, blood in urine)        swelling  11. PREGNANCY: \"Is there any chance you are pregnant?\" (e.g., yes, no; LMP)        Post menopausal    Protocols used: Back Pain-ADULT-[place]    "

## 2024-07-02 NOTE — PROGRESS NOTES
Assessment/Plan:    Contusion of buttock  Patient had a mechanical fall on June 22.  She went to the ER June 25 where x-ray of the sacrum showed no fractures and CAT scan of the lumbar spine revealed degenerative changes but no fractures.  She started physical therapy.    She presented today because she has been in her left buttocks that was worse today after physical therapy.  She denies any new falls or any new trauma to the buttocks or low back.  The pain radiated down the left leg initially but it does not radiate right now.  She has no lower extremity weakness. paresthesias or numbness.    She had a swelling of the left buttocks which was tender.  The overlying skin was not erythematous or warm.  She has a resolving subcutaneous hematoma around the swelling.    Her left lower extremity strength, muscle tone, muscle bulk, sensation to light touch was normal.  She had no foot drop when walking.    Suspect the swelling in the left buttocks is still a remnant of the contusion from the fall.  I doubt underlying abscess,.  X-rays and CT scans showed no fractures    Advised patient to use local measures of heat or ice whichever makes her feel better and apply either Bengay/IcyHot or Voltaren gel to the left buttock.  I found no indication to repeat imaging today      CKD (chronic kidney disease) stage 4, GFR 15-29 ml/min ([facility])  Lab Results   Component Value Date    EGFR 19 06/25/2024    EGFR 23 05/28/2024    EGFR 21 05/06/2024    CREATININE 2.58 (H) 06/25/2024    CREATININE 2.18 (H) 05/28/2024    CREATININE 2.39 (H) 05/06/2024     840 renal function was at baseline on June 25.  I advised her not to use NSAIDs         Diagnoses and all orders for this visit:    Contusion of buttock, subsequent encounter    CKD (chronic kidney disease) stage 4, GFR 15-29 ml/min ([facility])          Subjective:      Patient ID: Ayleen Moralez is a 64 y.o. female.      HPI    Patient presents for follow-up of chronic conditions as detailed  "in the assessment and plan.      The following portions of the patient's history were reviewed and updated as appropriate: current medications, past family history, past medical history, past social history, past surgical history and problem list.    Review of Systems      Objective:    /82   Pulse 75   Temp 99.6 °F (37.6 °C) (Tympanic)   Resp 16   Ht 5' 4\" (1.626 m)   Wt 91.6 kg (202 lb)   LMP  (LMP Unknown)   SpO2 97%   BMI 34.67 kg/m²      Physical Exam  Constitutional:       General: She is not in acute distress.     Appearance: She is not toxic-appearing.   Musculoskeletal:         General: Swelling and tenderness present. No deformity.   Neurological:      Mental Status: She is alert.      Sensory: No sensory deficit.      Motor: No weakness.             Willam Lange MD  "

## 2024-07-02 NOTE — ASSESSMENT & PLAN NOTE
Lab Results   Component Value Date    EGFR 19 06/25/2024    EGFR 23 05/28/2024    EGFR 21 05/06/2024    CREATININE 2.58 (H) 06/25/2024    CREATININE 2.18 (H) 05/28/2024    CREATININE 2.39 (H) 05/06/2024     840 renal function was at baseline on June 25.  I advised her not to use NSAIDs

## 2024-07-02 NOTE — ASSESSMENT & PLAN NOTE
Patient had a mechanical fall on June 22.  She went to the ER June 25 where x-ray of the sacrum showed no fractures and CAT scan of the lumbar spine revealed degenerative changes but no fractures.  She started physical therapy.    She presented today because she has been in her left buttocks that was worse today after physical therapy.  She denies any new falls or any new trauma to the buttocks or low back.  The pain radiated down the left leg initially but it does not radiate right now.  She has no lower extremity weakness. paresthesias or numbness.    She had a swelling of the left buttocks which was tender.  The overlying skin was not erythematous or warm.  She has a resolving subcutaneous hematoma around the swelling.    Her left lower extremity strength, muscle tone, muscle bulk, sensation to light touch was normal.  She had no foot drop when walking.    Suspect the swelling in the left buttocks is still a remnant of the contusion from the fall.  I doubt underlying abscess,.  X-rays and CT scans showed no fractures    Advised patient to use local measures of heat or ice whichever makes her feel better and apply either Bengay/IcyHot or Voltaren gel to the left buttock.  I found no indication to repeat imaging today

## 2024-07-02 NOTE — TELEPHONE ENCOUNTER
Reason for call:   [x] Refill   [] Prior Auth  [] Other:     Office:   [x] PCP/Provider - [facility]  [] Specialty/Provider -     Medication: traMADol (Ultram) 50 mg tablet     Dose/Frequency:  Take 1 tablet (50 mg total) by mouth every 12 (twelve) hours as needed for moderate pain     Quantity: 60    Pharmacy: [address] [place] [facility]. [phone]     Does the patient have enough for 3 days?   [x] Yes   [] No - Send as HP to POD

## 2024-07-02 NOTE — PROGRESS NOTES
"Daily Note     Today's date: 2024  Patient name: Ayleen Moralez  : 1960  MRN: [id number]  Referring provider: Yari Wallace PA-C  Dx:   Encounter Diagnosis     ICD-10-CM    1. Aftercare following left knee joint replacement surgery  Z47.1     Z96.652         Subjective: Compliant with HEP, no questions regarding POC, motivated to continue PT, bruise on low back from recent fall improving      Objective: See treatment diary below      Assessment: Tolerated treatment well with initiation of treatment program as noted below requiring verbal and tactile cues from PT for safe execution of therapeutic exercise. Patient demonstrated fatigue post treatment, exhibited good technique with therapeutic exercises, and would benefit from continued PT      Plan: Progress treatment as tolerated.       L TKA 2024      EPOC: 2024    Manuals             L Knee PROM             Gentle L tibiofemoral mobs                                       Neuro Re-Ed             L quad set Seated x10            HS curl w/swiss ball x25            Standing hip ext & abd X30 ea            SAQ iso             LAQ iso x25 3\"            Reverse squat slant                          Ther Ex             HR x30            TR x30            Slant board 10x10\"            Clam shell supine                                                     L Knee TERT with heat Into EXT             Ther Activity             LE Bike for improved L knee ROM 6' L1            Pt education: pathoanatomy, nature of sxs, POC, HEP  NS            Gait Training                                       Modalities                                                     "

## 2024-07-09 ENCOUNTER — OFFICE VISIT (OUTPATIENT)
Dept: PHYSICAL THERAPY | Facility: CLINIC | Age: 64
End: 2024-07-09
Payer: MEDICARE

## 2024-07-09 DIAGNOSIS — Z96.652 AFTERCARE FOLLOWING LEFT KNEE JOINT REPLACEMENT SURGERY: Primary | ICD-10-CM

## 2024-07-09 DIAGNOSIS — Z47.1 AFTERCARE FOLLOWING LEFT KNEE JOINT REPLACEMENT SURGERY: Primary | ICD-10-CM

## 2024-07-09 PROCEDURE — 97110 THERAPEUTIC EXERCISES: CPT

## 2024-07-09 PROCEDURE — 97112 NEUROMUSCULAR REEDUCATION: CPT

## 2024-07-09 PROCEDURE — 97140 MANUAL THERAPY 1/> REGIONS: CPT

## 2024-07-09 NOTE — PROGRESS NOTES
"Daily Note     Today's date: 2024  Patient name: Ayleen Moralez  : 1960  MRN: [id number]  Referring provider: Yari Wallace PA-C  Dx:   Encounter Diagnosis     ICD-10-CM    1. Aftercare following left knee joint replacement surgery  Z47.1     Z96.652                      Subjective: Pt reports she is better balanced w/o her SPC.      Objective: See treatment diary below      Assessment: Tolerated treatment well. Patient exhibited good technique with therapeutic exercises and would benefit from continued PT for cont'd LE strengthening.  Pt seems clearer in her head reporting she stopped taking allergy medicine.  Pt w/ good flexion in the knee, needs work on ext.      Plan: Continue per plan of care.  Progress treatment as tolerated.       L TKA 2024      EPOC: 2024    Manuals            L Knee PROM  8'           Gentle L tibiofemoral mobs                                       Neuro Re-Ed             L quad set Seated x10 Seated x10           HS curl w/swiss ball x25            Standing hip ext & abd, flex X30 ea OTB 10x2             SAQ iso             LAQ iso x25 3\" 3 lb 25x3\"           Reverse squat slant             Bridge dw/ ball sqeeze  15x5\"                        Ther Ex             HR x30 x30           TR x30 x30           Slant board 10x10\" 10x10\"           Clam shell supine   GTB 10x10\"           Butterfly stretch                          Side stepping  OTB 3 laps                                                  L Knee TERT with heat Into EXT             Ther Activity             LE Bike for improved L knee ROM 6' L1 6' L1           Pt education: pathoanatomy, nature of sxs, POC, HEP  NS            Gait Training                                       Modalities                                                       "

## 2024-07-11 ENCOUNTER — OFFICE VISIT (OUTPATIENT)
Dept: PHYSICAL THERAPY | Facility: CLINIC | Age: 64
End: 2024-07-11
Payer: MEDICARE

## 2024-07-11 DIAGNOSIS — Z96.652 AFTERCARE FOLLOWING LEFT KNEE JOINT REPLACEMENT SURGERY: Primary | ICD-10-CM

## 2024-07-11 DIAGNOSIS — Z47.1 AFTERCARE FOLLOWING LEFT KNEE JOINT REPLACEMENT SURGERY: Primary | ICD-10-CM

## 2024-07-11 PROCEDURE — 97530 THERAPEUTIC ACTIVITIES: CPT | Performed by: PHYSICAL THERAPIST

## 2024-07-11 PROCEDURE — 97112 NEUROMUSCULAR REEDUCATION: CPT | Performed by: PHYSICAL THERAPIST

## 2024-07-11 PROCEDURE — 97110 THERAPEUTIC EXERCISES: CPT | Performed by: PHYSICAL THERAPIST

## 2024-07-11 NOTE — PROGRESS NOTES
"Daily Note     Today's date: 2024  Patient name: Ayleen Moralez  : 1960  MRN: [id number]  Referring provider: Yari Wallace PA-C  Dx:   Encounter Diagnosis     ICD-10-CM    1. Aftercare following left knee joint replacement surgery  Z47.1     Z96.652                      Subjective: Compliant with HEP, no questions regarding POC, motivated to continue PT      Objective: See treatment diary below      Assessment: Tolerated treatment well with progression of treatment program as noted below requiring verbal and tactile cues from PT for safe execution of therapeutic exercise. Patient exhibited good technique with therapeutic exercises and would benefit from continued PT for cont'd LE strengthening for ease with prolonged weightbearing ADLs.      Plan: Continue per plan of care.  Progress treatment as tolerated.       L TKA 2024      EPOC: 2024    Manuals           L Knee PROM  8'           Gentle L tibiofemoral mobs                                       Neuro Re-Ed             L quad set Seated x10 Seated x10 Supine x20          HS curl w/swiss ball x25  x30          Standing hip ext & abd, flex X30 ea OTB 10x2             SAQ iso             LAQ iso x25 3\" 3 lb 25x3\"           Reverse squat slant             Bridge dw/ ball sqeeze  15x5\" x30                       Ther Ex             HR x30 x30 X30 airex          TR x30 x30 X30 airex          Slant board 10x10\" 10x10\" 10x10\"          Clam shell supine   GTB 10x10\" GTB x30          Butterfly stretch             bridge             Side stepping  OTB 3 laps           Leg ext & curl   X30 LLE 15                                    L Knee TERT with heat Into EXT             Ther Activity             LE Bike for improved L knee ROM 6' L1 6' L1 5' L3          Pt education: pathoanatomy, nature of sxs, POC, HEP  NS  NS 3'          Gait Training                                       Modalities                                        "

## 2024-07-12 ENCOUNTER — TELEPHONE (OUTPATIENT)
Dept: PSYCHIATRY | Facility: CLINIC | Age: 64
End: 2024-07-12

## 2024-07-12 NOTE — TELEPHONE ENCOUNTER
[facility] called client to inform them that Dr. Medina has left the practice and that client will have to be rescheduled with a new provider. [facility] cancelled scheduled appointment with Dr. Medina. Writer asked client to call back with any medication needs and let client know that intake will call them in regards to a transfer.

## 2024-07-16 ENCOUNTER — TELEPHONE (OUTPATIENT)
Age: 64
End: 2024-07-16

## 2024-07-16 ENCOUNTER — APPOINTMENT (OUTPATIENT)
Dept: PHYSICAL THERAPY | Facility: CLINIC | Age: 64
End: 2024-07-16
Payer: MEDICARE

## 2024-07-16 NOTE — TELEPHONE ENCOUNTER
Patient stated that she could not reach to the phone fast enough to  the call from the office. I could not find any encounter. Please call her back if needed.  Thank you!!

## 2024-07-16 NOTE — TELEPHONE ENCOUNTER
Patient inquiring about labs. Advised her the active ones have an expected date of around 12/1/24.

## 2024-07-17 ENCOUNTER — TELEPHONE (OUTPATIENT)
Dept: PSYCHIATRY | Facility: CLINIC | Age: 64
End: 2024-07-17

## 2024-07-18 ENCOUNTER — OFFICE VISIT (OUTPATIENT)
Dept: PHYSICAL THERAPY | Facility: CLINIC | Age: 64
End: 2024-07-18
Payer: MEDICARE

## 2024-07-18 DIAGNOSIS — Z47.1 AFTERCARE FOLLOWING LEFT KNEE JOINT REPLACEMENT SURGERY: Primary | ICD-10-CM

## 2024-07-18 DIAGNOSIS — Z96.652 AFTERCARE FOLLOWING LEFT KNEE JOINT REPLACEMENT SURGERY: Primary | ICD-10-CM

## 2024-07-18 PROCEDURE — 97110 THERAPEUTIC EXERCISES: CPT | Performed by: PHYSICAL THERAPIST

## 2024-07-18 PROCEDURE — 97112 NEUROMUSCULAR REEDUCATION: CPT | Performed by: PHYSICAL THERAPIST

## 2024-07-18 PROCEDURE — 97530 THERAPEUTIC ACTIVITIES: CPT | Performed by: PHYSICAL THERAPIST

## 2024-07-18 NOTE — PROGRESS NOTES
"Daily Note     Today's date: 2024  Patient name: Ayleen Moralez  : 1960  MRN: [id number]  Referring provider: Yari Wallace PA-C  Dx:   Encounter Diagnosis     ICD-10-CM    1. Aftercare following left knee joint replacement surgery  Z47.1     Z96.652                      Subjective: Compliant with HEP, no questions regarding POC, motivated to continue PT      Objective: See treatment diary below      Assessment: Tolerated treatment well with progression of treatment program as noted below requiring verbal and tactile cues from PT for safe execution of therapeutic exercise. Patient exhibited good technique with therapeutic exercises and would benefit from continued PT for cont'd LE strengthening for ease with prolonged weightbearing ADLs.      Plan: Continue per plan of care.  Progress treatment as tolerated.       L TKA 2024      EPOC: 2024    Manuals           L Knee PROM  8'           Gentle L tibiofemoral mobs                                       Neuro Re-Ed             L quad set Seated x10 Seated x10 Supine x20          HS curl w/swiss ball x25  x30          Standing hip ext & abd, flex X30 ea OTB 10x2             SAQ iso             LAQ iso x25 3\" 3 lb 25x3\"           Reverse squat slant             Bridge dw/ ball sqeeze  15x5\" x30                       Ther Ex             HR x30 x30 X30 airex          TR x30 x30 X30 airex          Slant board 10x10\" 10x10\" 10x10\"          Clam shell supine   GTB 10x10\" GTB x30          Butterfly stretch             bridge             Side stepping  OTB 3 laps           Leg ext & curl   X30 LLE 15                                    L Knee TERT with heat Into EXT             Ther Activity             LE Bike for improved L knee ROM 6' L1 6' L1 5' L3          Pt education: pathoanatomy, nature of sxs, POC, HEP  NS  NS 3'          Gait Training                                       Modalities                                        "

## 2024-07-23 ENCOUNTER — APPOINTMENT (OUTPATIENT)
Dept: PHYSICAL THERAPY | Facility: CLINIC | Age: 64
End: 2024-07-23
Payer: MEDICARE

## 2024-07-25 DIAGNOSIS — M79.605 BILATERAL LEG PAIN: ICD-10-CM

## 2024-07-25 DIAGNOSIS — M54.50 LUMBAR PAIN: ICD-10-CM

## 2024-07-25 DIAGNOSIS — M79.604 BILATERAL LEG PAIN: ICD-10-CM

## 2024-07-25 DIAGNOSIS — F31.81 BIPOLAR 2 DISORDER (HCC): Chronic | ICD-10-CM

## 2024-07-25 RX ORDER — CLONAZEPAM 0.5 MG/1
0.5 TABLET ORAL 3 TIMES DAILY
Qty: 270 TABLET | Refills: 0 | Status: SHIPPED | OUTPATIENT
Start: 2024-07-25

## 2024-07-25 RX ORDER — TRAMADOL HYDROCHLORIDE 50 MG/1
50 TABLET ORAL 2 TIMES DAILY PRN
Qty: 60 TABLET | Refills: 0 | Status: SHIPPED | OUTPATIENT
Start: 2024-07-25

## 2024-07-26 ENCOUNTER — TELEPHONE (OUTPATIENT)
Dept: GASTROENTEROLOGY | Facility: CLINIC | Age: 64
End: 2024-07-26

## 2024-07-30 ENCOUNTER — TELEPHONE (OUTPATIENT)
Age: 64
End: 2024-07-30

## 2024-07-30 ENCOUNTER — OFFICE VISIT (OUTPATIENT)
Dept: PHYSICAL THERAPY | Facility: CLINIC | Age: 64
End: 2024-07-30
Payer: MEDICARE

## 2024-07-30 DIAGNOSIS — Z96.652 AFTERCARE FOLLOWING LEFT KNEE JOINT REPLACEMENT SURGERY: Primary | ICD-10-CM

## 2024-07-30 DIAGNOSIS — Z47.1 AFTERCARE FOLLOWING LEFT KNEE JOINT REPLACEMENT SURGERY: Primary | ICD-10-CM

## 2024-07-30 PROCEDURE — 97110 THERAPEUTIC EXERCISES: CPT

## 2024-07-30 PROCEDURE — 97140 MANUAL THERAPY 1/> REGIONS: CPT

## 2024-07-30 PROCEDURE — 97112 NEUROMUSCULAR REEDUCATION: CPT

## 2024-07-30 NOTE — TELEPHONE ENCOUNTER
Scheduled date of EGD(as of today): 8/7/2024  Physician performing EGD: DR TREADWELL  Location of EGD: [facility]  Instructions reviewed with patient by: Previously reviewed with pt. EGD procedure directions sent via [employer].  Clearances:
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
## 2024-07-31 DIAGNOSIS — M79.604 BILATERAL LEG PAIN: ICD-10-CM

## 2024-07-31 DIAGNOSIS — M79.605 BILATERAL LEG PAIN: ICD-10-CM

## 2024-07-31 DIAGNOSIS — M54.50 LUMBAR PAIN: ICD-10-CM

## 2024-07-31 NOTE — TELEPHONE ENCOUNTER
Reason for call:   [x] Refill   [] Prior Auth  [] Other:     Office:   [x] PCP/Provider -   [] Specialty/Provider -               Does the patient have enough for 3 days?   [x] Yes   [] No - Send as HP to POD

## 2024-08-01 ENCOUNTER — OFFICE VISIT (OUTPATIENT)
Dept: PHYSICAL THERAPY | Facility: CLINIC | Age: 64
End: 2024-08-01
Payer: MEDICARE

## 2024-08-01 DIAGNOSIS — Z47.1 AFTERCARE FOLLOWING LEFT KNEE JOINT REPLACEMENT SURGERY: Primary | ICD-10-CM

## 2024-08-01 DIAGNOSIS — Z96.652 AFTERCARE FOLLOWING LEFT KNEE JOINT REPLACEMENT SURGERY: Primary | ICD-10-CM

## 2024-08-01 PROCEDURE — 97140 MANUAL THERAPY 1/> REGIONS: CPT | Performed by: PHYSICAL THERAPIST

## 2024-08-01 PROCEDURE — 97530 THERAPEUTIC ACTIVITIES: CPT | Performed by: PHYSICAL THERAPIST

## 2024-08-01 PROCEDURE — 97112 NEUROMUSCULAR REEDUCATION: CPT | Performed by: PHYSICAL THERAPIST

## 2024-08-01 NOTE — PROGRESS NOTES
PT Re-Evaluation     Today's date: 2024  Patient name: Ayleen Moralez  : 1960  MRN: [id number]  Referring provider: Yari Wallace PA-C  Dx:   Encounter Diagnosis     ICD-10-CM     Aftercare following Left knee joint replacement surgery Z47.1  Z96.652                      Assessment  Assessment details: Ayleen Moralez is a 62 y.o. female presenting to outpatient physical therapy at [facility] with complaints of L knee pain, stiffness and weakness.  She presents with decreased L knee range of motion, decreased strength, limited flexibility, poor postural awareness, poor body mechanics, altered gait pattern, poor balance, decreased tolerance to activity and decreased functional mobility due to Primary osteoarthritis of left knee  Lumbar radiculopathy.  She would benefit from skilled PT services in order to address these deficits and reach maximum level of function.  Thank you for the referral!    Impairments: abnormal or restricted ROM, activity intolerance, impaired balance, impaired physical strength, lacks appropriate home exercise program and pain with function    Symptom irritability: moderateUnderstanding of Dx/Px/POC: good   Prognosis: good    Goals  STG  1. Independent with HEP in 4 weeks      Goal met  2. Decrease pain at worst by 50% in 4 weeks     Goal met    LTG  1. Increase LLE strength in all planes to 5/5 in 8 weeks   Good progress  2. Return to full, unrestricted stair & step negotiation in 8 weeks  Good progress    Plan  Patient would benefit from: continued skilled physical therapy  Planned modality interventions: thermotherapy: hydrocollator packs  Planned therapy interventions: manual therapy, neuromuscular re-education, therapeutic activities, therapeutic exercise and home exercise program  Frequency: 2x week  Duration in weeks: 4  Treatment plan discussed with: patient  Start of plan of care: 2024  EPOC: 2024        Subjective Evaluation    History of Present  "Illness  Date of onset: 2024  Surgery date: 2024  Quality of life: good    Patient Goals  Patient goals for therapy: decreased pain and increased strength  Patient goal: painfree stair & step negotiation   Pain  Current pain ratin  At best pain ratin  At worst pain rating: 10      5/10  Location: posteriormedial L knee  Quality: sharp      Diagnostic Tests  X-ray: abnormal (Moderate osteoarthritis with narrowing of the medial tibiofemoral joint and small osteophytes seen.)  Treatments  Previous treatment: injection treatment and medication        Objective     Active Range of Motion   Left Knee   Flexion: 115 degrees with pain      Extension: 5 degrees with pain        Right Knee   Normal active range of motion    Strength/Myotome Testing     Left Knee   Flexion: 4-       4  Extension: 4-      4  Quadriceps contraction: fair    Good      Right Knee   Normal strength    Tests     Left Knee   Negative anterior drawer, lateral Kateryna, medial Kateryna, posterior drawer, posterior Lachman, Thessaly's test at 5 degrees and Thessaly's test at 20 degrees.     L TKA  2024    EPOC: 2024      EPOC: 2024    Manuals          L Knee PROM          Gentle L tibiofemoral mobs          RE NS 8'         FOTO nv         Neuro Re-Ed          L quad set Supine x20         HS curl w/swiss ball          Standing hip ext & abd, flex OTB hip abd 10 ea         SAQ iso          LAQ iso 3 lb 25x3\"         Reverse squat slant          Bridge dw/ ball sqeeze                    Ther Ex          HR X30 slt bd         TR          Slant board 10x10\"         Clam shell supine          Butterfly stretch          bridge 20x3\"         Side stepping          Leg ext & curl          HL abd GTB 20x5         SLR 10         L Knee TERT with heat Into EXT          Ther Activity          LE Bike for improved L knee ROM 5' L1 8' L1        Pt education: pathoanatomy, nature of sxs, POC, HEP          Gait Training                 "              Modalities

## 2024-08-02 DIAGNOSIS — F20.9 SCHIZOPHRENIA, UNSPECIFIED TYPE (HCC): ICD-10-CM

## 2024-08-02 DIAGNOSIS — F31.10 BIPOLAR I DISORDER, MOST RECENT EPISODE MANIC (HCC): ICD-10-CM

## 2024-08-02 RX ORDER — BENZTROPINE MESYLATE 1 MG/1
TABLET ORAL
Qty: 60 TABLET | Refills: 1 | Status: SHIPPED | OUTPATIENT
Start: 2024-08-02

## 2024-08-02 NOTE — TELEPHONE ENCOUNTER
Reason for call:   [x] Refill   [] Prior Auth  [] Other:     Office:   [] PCP/Provider -   [x] Specialty/Provider - PSYCHIATRY    Medication:     Does the patient have enough for 3 days?   [] Yes   [x] No - Send as HP to POD

## 2024-08-05 ENCOUNTER — OFFICE VISIT (OUTPATIENT)
Age: 64
End: 2024-08-05
Payer: MEDICARE

## 2024-08-05 VITALS
BODY MASS INDEX: 34.28 KG/M2 | HEART RATE: 68 BPM | SYSTOLIC BLOOD PRESSURE: 138 MMHG | OXYGEN SATURATION: 99 % | WEIGHT: 200.8 LBS | HEIGHT: 64 IN | DIASTOLIC BLOOD PRESSURE: 78 MMHG | TEMPERATURE: 98.5 F

## 2024-08-05 DIAGNOSIS — G47.33 OSA (OBSTRUCTIVE SLEEP APNEA): ICD-10-CM

## 2024-08-05 DIAGNOSIS — I26.99 PULMONARY EMBOLISM WITH INFARCTION (HCC): ICD-10-CM

## 2024-08-05 DIAGNOSIS — J45.40 MODERATE PERSISTENT ASTHMA WITHOUT COMPLICATION: Primary | ICD-10-CM

## 2024-08-05 DIAGNOSIS — J84.9 ILD (INTERSTITIAL LUNG DISEASE) (HCC): ICD-10-CM

## 2024-08-05 PROCEDURE — 99214 OFFICE O/P EST MOD 30 MIN: CPT | Performed by: INTERNAL MEDICINE

## 2024-08-05 NOTE — ASSESSMENT & PLAN NOTE
She has obstructive sleep apnea by history and as described by hospital personnel during recent admission.  She will undergo home sleep study for further evaluation.  If it is confirmed she has SPENCER, will need a face-to-face visit after she has received her equipment.

## 2024-08-05 NOTE — PROGRESS NOTES
Pulmonary Follow Up Note   Ayleen Moralez 64 y.o. female MRN: [id number]  8/5/2024      Assessment/Plan:     SPENCER (obstructive sleep apnea) - suspected  She has obstructive sleep apnea by history and as described by hospital personnel during recent admission.  She will undergo home sleep study for further evaluation.  If it is confirmed she has SPENCER, will need a face-to-face visit after she has received her equipment.    Moderate persistent asthma without complication  Asthma has been well-controlled, only using Symbicort on an as-needed basis.      Visit orders:    Diagnoses and all orders for this visit:    Moderate persistent asthma without complication    Pulmonary embolism with infarction ([facility])  -     Ambulatory Referral to Pulmonology    ILD (interstitial lung disease) ([facility])  -     Ambulatory Referral to Pulmonology    SPENCER (obstructive sleep apnea)  -     Ambulatory Referral to Sleep Medicine; Future          History of Present Illness   HPI:  Ayleen Moralez is a 64 y.o. female who is here today for follow-up regarding asthma.  She is doing well from an asthma perspective and only uses Symbicort on an as-needed basis.  She underwent knee replacement in February.  During that hospitalization, there was concern that she was having significant snoring with witnessed apneic events.  It is unclear whether there was associated desaturation.  Patient does report that she continues to have episodes of awakening with choking and gasping.  She has daytime sleepiness but does not take naps during the day.  She has no significant cough, wheeze or sputum production.  She has shortness of breath with moderate exertion.    Review of Systems otherwise negative    Medical, Family and Social history reviewed and updated as appropriate    Historical Information   Past Medical History:   Diagnosis Date    Anxiety     Anxiety disorder     Arthritis     At risk for falls     Bipolar 2 disorder ([facility])     Chronic back pain      Chronic kidney disease     Closed fracture of distal end of right fibula with routine healing 2020    COVID-19     in 2021    CVA (cerebral vascular accident) (HCC)     noted on MRI in the past    Depression     GERD (gastroesophageal reflux disease)     Hypercholesteremia     Hypernatremia     Hypertension     Hypokalemia     Idiopathic chronic pancreatitis (HCC) 2018    Intervertebral disc disorder with radiculopathy of lumbosacral region     resolved: 2015    Kidney disease     Kidney disease     Limb alert care status     LUE-fistula    Panic attacks     Pericardial effusion     PONV (postoperative nausea and vomiting)     Psychiatric problem     Radiculitis     resolved: 2015    Secondary renal hyperparathyroidism (HCC)     Stroke (HCC)     Vitamin D deficiency      Past Surgical History:   Procedure Laterality Date    BUNIONECTOMY      Left foot     CAST APPLICATION Right 2022    Procedure: Application short-arm thumb spica splint;  Surgeon: Lyndon Victoria MD;  Location: [facility];  Service: Orthopedics    COLON SURGERY      COLONOSCOPY  2021    DILATION AND CURETTAGE OF UTERUS      INDUCED       surgically induced    MI ARTERIOVENOUS ANASTOMOSIS OPEN DIRECT Left 2019    Procedure: CREATION FISTULA ARTERIOVENOUS (AV) left wrists possible left upper;  Surgeon: Andrey Quintero MD;  Location: [facility];  Service: Vascular    MI ARTHRP KNE CONDYLE&PLATU MEDIAL&LAT COMPARTMENTS Left 2024    Procedure: ARTHROPLASTY KNEE TOTAL SAME DAY;  Surgeon: Riki Ma MD;  Location: [facility];  Service: Orthopedics    MI [facility] METAR OSTEOT Left 2019    Procedure: Serge bunionectrenee;  Surgeon: Munir Larkin DPM;  [facility];  Service: Podiatry    [facility] 8/[facility]/2020    Procedure: BUNIONECTOMY RAFAEL;  Surgeon: Munir Larkin DPM;  Location: [facility];  Service: Podiatry    MI  CORRJ HLX VLGS BNCTY SESMDC PROX PHLX OSTEOT Right 9/27/2021    Procedure: BUNIONECTOMY TAMEKA, right tameka osteotomy and 2nd claw toe correction;  Surgeon: James R Lachman, MD;  Location: [facility];  Service: Orthopedics    TN ERCP DX COLLECTION SPECIMEN BRUSHING/WASHING N/A 4/11/2018    Procedure: ENDOSCOPIC RETROGRADE CHOLANGIOPANCREATOGRAPHY (ERCP);  Surgeon: Alfredo Messina MD;  Location: [facility];  Service: Gastroenterology    TN LAPAROSCOPY PROCTOPEXY PROLAPSE N/A 7/13/2018    Procedure: ROBOTIC SIGMOID RESECTION / RECTOPEXY;  Surgeon: ELPIDIO Mcnulty MD;  Location: [facility];  Service: Colorectal    TN OPEN TREATMENT RADIAL SHAFT FRACTURE Right 10/11/2021    Procedure: OPEN REDUCTION W/ INTERNAL FIXATION (ORIF) RADIUS (WRIST), RIGHT DISTAL;  Surgeon: Riki Ma MD;  Location: [facility];  Service: Orthopedics    TN REMOVAL IMPLANT DEEP Right 6/23/2022    Procedure: Removal of hardware volar aspect right distal radius (distal radial plate and screws);  Surgeon: Lyndon Victoria MD;  Location: [facility];  Service: Orthopedics    TN SIGMOIDOSCOPY FLX DX W/COLLJ SPEC BR/WA IF PFRMD N/A 7/13/2018    Procedure: SIGMOIDOSCOPY FLEXIBLE;  Surgeon: ELPIDIO Mcnulty MD;  [facility];  Service: Colorectal    TN TR TDN RESTORE INTRNSC FUNCJ ALL 4 FNGRS Right 6/23/2022    Procedure: Right ring finger flexor digitorum superficialis to flexor pollicis longus tendon transfer;  Surgeon: Lyndon Victoria MD;  Location: [facility];  Service: Orthopedics    TUBAL LIGATION Bilateral 1997    US GUIDED THYROID BIOPSY  7/30/2019     Family History   Problem Relation Age of Onset    Bipolar disorder Mother     Mental illness Mother         depression    Stroke Mother     Dementia Mother     Colon polyps Mother     Heart disease Father     Hypertension Father     Diabetes Father     Other Family         Back disorder    Diabetes Family     Heart disease Family     Hypertension Family     Stroke Family      Thyroid disease Family     Breast cancer Paternal Grandmother         age unknown    Breast cancer Paternal Aunt         age unknown    Breast cancer Maternal Aunt         age unknown    Mental illness Sister     Colon polyps Sister     Mental illness Sister     Heart disease Sister     No Known Problems Sister     Breast cancer Sister 68    Other Son         pituitary tumor    Hypertension Son     Obesity Son     No Known Problems Son     No Known Problems Maternal Grandmother     No Known Problems Maternal Grandfather     No Known Problems Paternal Grandfather     Breast cancer Paternal Aunt         age unknown    Substance Abuse Neg Hx         neg fam hx    Colon cancer Neg Hx        Social History     Tobacco Use   Smoking Status Never   Smokeless Tobacco Never         Meds/Allergies     Current Outpatient Medications:     acetaminophen (TYLENOL) 500 mg tablet, Take 2 tablets (1,000 mg total) by mouth every 8 (eight) hours, Disp: 60 tablet, Rfl: 0    albuterol (Ventolin HFA) 90 mcg/act inhaler, Inhale 2 puffs every 6 (six) hours as needed for wheezing or shortness of breath, Disp: 8.5 g, Rfl: 3    alendronate (Fosamax) 70 mg tablet, Take 1 tablet (70 mg total) by mouth every 7 days, Disp: 4 tablet, Rfl: 5    AMILoride 5 mg tablet, Take 1 tablet (5 mg total) by mouth 2 (two) times a day (Patient taking differently: Take 5 mg by mouth 2 (two) times a day), Disp: 180 tablet, Rfl: 3    amLODIPine (NORVASC) 5 mg tablet, Take 1 tablet (5 mg total) by mouth daily, Disp: 90 tablet, Rfl: 3    ascorbic acid (VITAMIN C) 500 MG tablet, Take 1 tablet (500 mg total) by mouth 2 (two) times a day, Disp: 60 tablet, Rfl: 2    aspirin 81 mg chewable tablet, Chew 1 tablet (81 mg total) 2 (two) times a day, Disp: 60 tablet, Rfl: 0    benztropine (COGENTIN) 1 mg tablet, Benztropine Mesylate 1 m tablet in the morning and 1 tablet at night, Disp: 60 tablet, Rfl: 1    budesonide-formoterol (Symbicort) 160-4.5 mcg/act inhaler, Inhale  2 puffs 2 (two) times a day Rinse mouth after use., Disp: 10.2 g, Rfl: 11    carvedilol (COREG) 25 mg tablet, Take 1 tablet (25 mg total) by mouth 2 (two) times a day with meals, Disp: 180 tablet, Rfl: 3    cholecalciferol (VITAMIN D3) 1,000 units tablet, Take 5 tablets (5,000 Units total) by mouth daily, Disp: 90 tablet, Rfl: 5    clonazePAM (KlonoPIN) 0.5 mg tablet, TAKE 1 TABLET BY MOUTH THREE TIMES DAILY, Disp: 270 tablet, Rfl: 0    Cyanocobalamin (VITAMIN B 12 PO), Take 1,000 mcg by mouth in the morning, Disp: , Rfl:     ferrous sulfate 324 (65 Fe) mg, Take 1 tablet (324 mg total) by mouth 2 (two) times a day before meals, Disp: 60 tablet, Rfl: 2    fluvoxaMINE (LUVOX) 100 mg tablet, Fluvoxamine 100m tablet in the morning ; 2 tablets at night (Patient taking differently: Fluvoxamine 300 mg  in the morning ;), Disp: 270 tablet, Rfl: 1    lamoTRIgine (LaMICtal) 200 MG tablet, Lamotrigine 200m tablet in the morning , 1 tablet at night, Disp: 180 tablet, Rfl: 0    Multiple Vitamin (MULTI-VITAMIN) tablet, Take 1 tablet by mouth daily, Disp: 30 tablet, Rfl: 2    omeprazole (PriLOSEC) 40 MG capsule, TAKE 1 CAPSULE BY MOUTH DAILY  BEFORE BREAKFAST, Disp: 90 capsule, Rfl: 1    ondansetron (ZOFRAN) 4 mg tablet, Take 1 tablet (4 mg total) by mouth every 8 (eight) hours as needed for nausea or vomiting, Disp: 30 tablet, Rfl: 1    QUEtiapine (SEROquel) 400 MG tablet, Quetiapine Fumarate 400m tablets at night, Disp: 180 tablet, Rfl: 0    rosuvastatin (CRESTOR) 20 MG tablet, Take 1 tablet (20 mg total) by mouth every evening, Disp: 90 tablet, Rfl: 3    traMADol (ULTRAM) 50 mg tablet, Take 1 tablet (50 mg total) by mouth 2 (two) times a day as needed for moderate pain, Disp: 60 tablet, Rfl: 0    folic acid (FOLVITE) 1 mg tablet, Take 1 tablet (1 mg total) by mouth daily (Patient not taking: Reported on 2024), Disp: 30 tablet, Rfl: 2  Allergies   Allergen Reactions    Molds & Smuts Nasal Congestion    Bee  "Pollen Nasal Congestion     Other reaction(s): Nasal Congestion    Pollen Extract Nasal Congestion       Vitals: Blood pressure 138/78, pulse 68, temperature 98.5 °F (36.9 °C), temperature source Tympanic, height 5' 4\" (1.626 m), weight 91.1 kg (200 lb 12.8 oz), SpO2 99%, not currently breastfeeding. Body mass index is 34.47 kg/m². Oxygen Therapy  SpO2: 99 %  Oxygen Therapy: None (Room air)    Physical Exam   Physical Exam  Vitals reviewed.   Constitutional:       General: She is not in acute distress.     Appearance: She is well-developed.   Eyes:      General: No scleral icterus.  Neck:      Vascular: No JVD.      Comments: Neck circumference 15 inches  Cardiovascular:      Rate and Rhythm: Normal rate and regular rhythm.      Heart sounds: Murmur heard.   Pulmonary:      Breath sounds: No wheezing, rhonchi or rales.   Skin:     General: Skin is warm and dry.   Neurological:      Mental Status: She is alert and oriented to person, place, and time.         Labs: I have personally reviewed pertinent lab results.  Lab Results   Component Value Date    WBC 6.54 06/25/2024    HGB 10.1 (L) 06/25/2024    HCT 33.4 (L) 06/25/2024     (H) 06/25/2024     06/25/2024     Lab Results   Component Value Date    CALCIUM 9.4 06/25/2024     02/27/2017    K 4.9 06/25/2024    CO2 23 06/25/2024     06/25/2024    BUN 30 (H) 06/25/2024    CREATININE 2.58 (H) 06/25/2024     Lab Results   Component Value Date    IGE 23.5 09/06/2019     Lab Results   Component Value Date    ALT 17 06/25/2024    AST 20 06/25/2024    ALKPHOS 124 (H) 06/25/2024       Imaging and other studies: I have personally reviewed pertinent reports.   and I have personally reviewed pertinent films in PACS CT of the chest from 11/10/2023 shows clear lungs  "

## 2024-08-06 RX ORDER — TRAMADOL HYDROCHLORIDE 50 MG/1
50 TABLET ORAL 2 TIMES DAILY PRN
Qty: 60 TABLET | Refills: 0 | Status: SHIPPED | OUTPATIENT
Start: 2024-08-06

## 2024-08-06 NOTE — TELEPHONE ENCOUNTER
Unable to lvm for pt-sent Rewardi[employer] message to pt that Dr Messina was not available at [facility] for the rest of the year. Pt can schedule with another provider

## 2024-08-06 NOTE — TELEPHONE ENCOUNTER
Crissi patient cancel her EGD but there were no openings with Dr Olsen at [facility], I did place patient on a wait list thank you

## 2024-08-12 DIAGNOSIS — F20.9 SCHIZOPHRENIA, UNSPECIFIED TYPE (HCC): ICD-10-CM

## 2024-08-12 DIAGNOSIS — F31.10 BIPOLAR I DISORDER, MOST RECENT EPISODE MANIC (HCC): ICD-10-CM

## 2024-08-13 ENCOUNTER — TELEPHONE (OUTPATIENT)
Dept: PSYCHIATRY | Facility: CLINIC | Age: 64
End: 2024-08-13

## 2024-08-13 NOTE — TELEPHONE ENCOUNTER
Good morning ,   Please confirm  MARLYS date with Dr. Bob so that an appropriate amount of medication can be prescribed to hold client over.     Thanks

## 2024-08-13 NOTE — TELEPHONE ENCOUNTER
Attempted to call client to transfer/give her 's number for a transfer of care appointment so that there is a timeline for medication refills to give covering doctor.    Client's number has calling restrictions.     If client calls please transfer her to  or tell her to call [phone].    Please note that  has given specific permission to give this client her extension due to the communication issue.

## 2024-08-13 NOTE — TELEPHONE ENCOUNTER
For covering provider's review.   Pool attached- please contact Ayleen to schedule a MARLYS. Thanks!

## 2024-08-13 NOTE — TELEPHONE ENCOUNTER
I will not provide a 1 year supply. Please message back once a timeline for their MARLYS is in place so I may provide an appropriate amount of refills. I do not know this patient and the idea that I will provide a 1 year supply without knowing anything about their response is unsafe.

## 2024-08-14 ENCOUNTER — TELEPHONE (OUTPATIENT)
Dept: BEHAVIORAL/MENTAL HEALTH CLINIC | Facility: CLINIC | Age: 64
End: 2024-08-14

## 2024-08-14 ENCOUNTER — TELEPHONE (OUTPATIENT)
Dept: PSYCHIATRY | Facility: CLINIC | Age: 64
End: 2024-08-14

## 2024-08-14 NOTE — TELEPHONE ENCOUNTER
Sent email regarding MARLYS apt.  Prior Dr. Carol cerna.  Phone has calling restrictions and unable to LVM regarding apt.

## 2024-08-14 NOTE — TELEPHONE ENCOUNTER
Patient is calling regarding cancelling an appointment.    Date/Time: 08/14/2024 @ 10am    Reason: Was unable to make appt    Patient was rescheduled: YES [] NO [x]  If yes, when was Patient reschedule for: n/a    Patient requesting call back to reschedule: YES [x] NO [] (Pt was transferred to [facility] to reschedule)

## 2024-08-15 ENCOUNTER — APPOINTMENT (OUTPATIENT)
Dept: PHYSICAL THERAPY | Facility: CLINIC | Age: 64
End: 2024-08-15
Payer: MEDICARE

## 2024-08-15 RX ORDER — QUETIAPINE FUMARATE 400 MG/1
TABLET, FILM COATED ORAL
Qty: 60 TABLET | Refills: 0 | Status: SHIPPED | OUTPATIENT
Start: 2024-08-15

## 2024-08-15 NOTE — TELEPHONE ENCOUNTER
Looks like she received a 90 day supply in June. I can fill it for another month to bridge her to a new provider. I am hesitant to do more because I am not following her.

## 2024-08-16 DIAGNOSIS — G47.33 OSA (OBSTRUCTIVE SLEEP APNEA): Primary | ICD-10-CM

## 2024-08-20 ENCOUNTER — APPOINTMENT (OUTPATIENT)
Dept: PHYSICAL THERAPY | Facility: CLINIC | Age: 64
End: 2024-08-20
Payer: MEDICARE

## 2024-08-22 ENCOUNTER — OFFICE VISIT (OUTPATIENT)
Dept: FAMILY MEDICINE CLINIC | Facility: HOSPITAL | Age: 64
End: 2024-08-22
Payer: MEDICARE

## 2024-08-22 ENCOUNTER — APPOINTMENT (OUTPATIENT)
Dept: PHYSICAL THERAPY | Facility: CLINIC | Age: 64
End: 2024-08-22
Payer: MEDICARE

## 2024-08-22 VITALS
SYSTOLIC BLOOD PRESSURE: 102 MMHG | WEIGHT: 198 LBS | OXYGEN SATURATION: 97 % | DIASTOLIC BLOOD PRESSURE: 66 MMHG | HEART RATE: 76 BPM | BODY MASS INDEX: 33.99 KG/M2

## 2024-08-22 DIAGNOSIS — I10 ESSENTIAL HYPERTENSION: ICD-10-CM

## 2024-08-22 DIAGNOSIS — I35.0 MODERATE AORTIC STENOSIS: ICD-10-CM

## 2024-08-22 DIAGNOSIS — L03.818 CELLULITIS OF OTHER SPECIFIED SITE: ICD-10-CM

## 2024-08-22 DIAGNOSIS — J45.40 MODERATE PERSISTENT ASTHMA WITHOUT COMPLICATION: ICD-10-CM

## 2024-08-22 DIAGNOSIS — N60.82 CYST OF SKIN OF LEFT BREAST: Primary | ICD-10-CM

## 2024-08-22 PROCEDURE — 99214 OFFICE O/P EST MOD 30 MIN: CPT | Performed by: INTERNAL MEDICINE

## 2024-08-22 PROCEDURE — G2211 COMPLEX E/M VISIT ADD ON: HCPCS | Performed by: INTERNAL MEDICINE

## 2024-08-22 RX ORDER — CEPHALEXIN 500 MG/1
500 CAPSULE ORAL EVERY 6 HOURS SCHEDULED
Qty: 28 CAPSULE | Refills: 0 | Status: SHIPPED | OUTPATIENT
Start: 2024-08-22 | End: 2024-08-29

## 2024-08-22 NOTE — PROGRESS NOTES
"Assessment/Plan:     Diagnosis ICD-10-CM Associated Orders   1. Cyst of skin of left breast  N60.82 cephalexin (KEFLEX) 500 mg capsule      2. Cellulitis of other specified site  L03.818 cephalexin (KEFLEX) 500 mg capsule      3. Moderate aortic stenosis  I35.0       4. Essential hypertension  I10       5. Moderate persistent asthma without complication  J45.40           Problem List Items Addressed This Visit        Cardiovascular and Mediastinum    Essential hypertension     Well controlled today         Moderate aortic stenosis     Is scheduled for 1 year repeat echo in  September with Dr. Garcia            Respiratory    Moderate persistent asthma without complication     Symbicort and prn albuterol        Other Visit Diagnoses     Cyst of skin of left breast    -  Primary    Relevant Medications    cephalexin (KEFLEX) 500 mg capsule    Cellulitis of other specified site        Relevant Medications    cephalexin (KEFLEX) 500 mg capsule            Return in about 5 days (around 8/27/2024) for Recheck.      Subjective:    Patient ID: Ayleen Moralez is a 64 y.o. female    Noticed increasing tenderness and redness of left breast in past 4 days- now red and inflamed with small amount of drainage inner  lower quadrant this am.  No fever or chills  Has never had similar \"boils\" in past         The following portions of the patient's history were reviewed and updated as appropriate: allergies, current medications and problem list.     Review of Systems   Constitutional:  Negative for fever.   HENT:  Negative for congestion.    Respiratory:  Negative for shortness of breath.    Cardiovascular:  Negative for chest pain and palpitations.   Gastrointestinal:  Negative for abdominal pain.   Musculoskeletal:  Negative for back pain.   All other systems reviewed and are negative.        Objective:      Current Outpatient Medications:   •  acetaminophen (TYLENOL) 500 mg tablet, Take 2 tablets (1,000 mg total) by mouth every " 8 (eight) hours, Disp: 60 tablet, Rfl: 0  •  albuterol (Ventolin HFA) 90 mcg/act inhaler, Inhale 2 puffs every 6 (six) hours as needed for wheezing or shortness of breath, Disp: 8.5 g, Rfl: 3  •  alendronate (Fosamax) 70 mg tablet, Take 1 tablet (70 mg total) by mouth every 7 days, Disp: 4 tablet, Rfl: 5  •  AMILoride 5 mg tablet, Take 1 tablet (5 mg total) by mouth 2 (two) times a day (Patient taking differently: Take 5 mg by mouth 2 (two) times a day), Disp: 180 tablet, Rfl: 3  •  amLODIPine (NORVASC) 5 mg tablet, Take 1 tablet (5 mg total) by mouth daily, Disp: 90 tablet, Rfl: 3  •  ascorbic acid (VITAMIN C) 500 MG tablet, Take 1 tablet (500 mg total) by mouth 2 (two) times a day, Disp: 60 tablet, Rfl: 2  •  aspirin 81 mg chewable tablet, Chew 1 tablet (81 mg total) 2 (two) times a day, Disp: 60 tablet, Rfl: 0  •  budesonide-formoterol (Symbicort) 160-4.5 mcg/act inhaler, Inhale 2 puffs 2 (two) times a day Rinse mouth after use., Disp: 10.2 g, Rfl: 11  •  carvedilol (COREG) 25 mg tablet, Take 1 tablet (25 mg total) by mouth 2 (two) times a day with meals, Disp: 180 tablet, Rfl: 3  •  cephalexin (KEFLEX) 500 mg capsule, Take 1 capsule (500 mg total) by mouth every 6 (six) hours for 7 days, Disp: 28 capsule, Rfl: 0  •  cholecalciferol (VITAMIN D3) 1,000 units tablet, Take 5 tablets (5,000 Units total) by mouth daily, Disp: 90 tablet, Rfl: 5  •  clonazePAM (KlonoPIN) 0.5 mg tablet, TAKE 1 TABLET BY MOUTH THREE TIMES DAILY, Disp: 270 tablet, Rfl: 0  •  Cyanocobalamin (VITAMIN B 12 PO), Take 1,000 mcg by mouth in the morning, Disp: , Rfl:   •  ferrous sulfate 324 (65 Fe) mg, Take 1 tablet (324 mg total) by mouth 2 (two) times a day before meals, Disp: 60 tablet, Rfl: 2  •  fluvoxaMINE (LUVOX) 100 mg tablet, Fluvoxamine 100m tablet in the morning ; 2 tablets at night (Patient taking differently: Fluvoxamine 300 mg  in the morning ;), Disp: 270 tablet, Rfl: 1  •  lamoTRIgine (LaMICtal) 200 MG tablet, Lamotrigine  200m tablet in the morning , 1 tablet at night, Disp: 180 tablet, Rfl: 0  •  Multiple Vitamin (MULTI-VITAMIN) tablet, Take 1 tablet by mouth daily, Disp: 30 tablet, Rfl: 2  •  omeprazole (PriLOSEC) 40 MG capsule, TAKE 1 CAPSULE BY MOUTH DAILY  BEFORE BREAKFAST, Disp: 90 capsule, Rfl: 1  •  ondansetron (ZOFRAN) 4 mg tablet, Take 1 tablet (4 mg total) by mouth every 8 (eight) hours as needed for nausea or vomiting, Disp: 30 tablet, Rfl: 1  •  QUEtiapine (SEROquel) 400 MG tablet, TAKE 2 TABLETS BY MOUTH AT NIGHT (Patient taking differently: TAKE 2 TABLETS BY MOUTH AT NIGHT IN ADDITION  MG .), Disp: 60 tablet, Rfl: 0  •  rosuvastatin (CRESTOR) 20 MG tablet, Take 1 tablet (20 mg total) by mouth every evening, Disp: 90 tablet, Rfl: 3  •  traMADol (ULTRAM) 50 mg tablet, Take 1 tablet (50 mg total) by mouth 2 (two) times a day as needed for moderate pain, Disp: 60 tablet, Rfl: 0  •  benztropine (COGENTIN) 1 mg tablet, Benztropine Mesylate 1 m tablet in the morning and 1 tablet at night (Patient not taking: Reported on 2024), Disp: 60 tablet, Rfl: 1  •  folic acid (FOLVITE) 1 mg tablet, Take 1 tablet (1 mg total) by mouth daily (Patient not taking: Reported on 2024), Disp: 30 tablet, Rfl: 2    Blood pressure 102/66, pulse 76, weight 89.8 kg (198 lb), SpO2 97%, not currently breastfeeding.     Physical Exam  Vitals and nursing note reviewed.   Constitutional:       General: She is not in acute distress.  HENT:      Head: Normocephalic.      Right Ear: Tympanic membrane normal. There is no impacted cerumen.      Left Ear: Tympanic membrane normal.      Nose: No congestion.      Mouth/Throat:      Pharynx: No posterior oropharyngeal erythema.   Cardiovascular:      Rate and Rhythm: Normal rate and regular rhythm.      Heart sounds: Murmur heard.      Comments: Stable as murmur  Pulmonary:      Breath sounds: No rhonchi or rales.   Abdominal:      Palpations: Abdomen is soft.   Skin:     Findings:  Erythema present.      Comments: 2 cm inflamed furuncle in left inner breast with minimal drainage. Significant tenderness   Neurological:      Mental Status: She is alert.

## 2024-08-27 ENCOUNTER — APPOINTMENT (OUTPATIENT)
Dept: PHYSICAL THERAPY | Facility: CLINIC | Age: 64
End: 2024-08-27
Payer: MEDICARE

## 2024-08-27 ENCOUNTER — TELEPHONE (OUTPATIENT)
Age: 64
End: 2024-08-27

## 2024-08-27 ENCOUNTER — TELEPHONE (OUTPATIENT)
Dept: PSYCHIATRY | Facility: CLINIC | Age: 64
End: 2024-08-27

## 2024-08-27 DIAGNOSIS — F31.10 BIPOLAR I DISORDER, MOST RECENT EPISODE MANIC (HCC): ICD-10-CM

## 2024-08-27 NOTE — TELEPHONE ENCOUNTER
Attempted to call back and a message said the number has calling restrictions and to try again later.     When Ayleen returns call will review with her that seroquel 300 mg was discontinued. She also needs to schedule a MARLYS appt as Dr. Medina is no longer in office.

## 2024-08-27 NOTE — TELEPHONE ENCOUNTER
Patient called the RX Refill Line. Message is being forwarded to the office.     Patient is requesting a refill on QUEtiapine (SEROquel.  She is asking for a 300 mg 1 PO QD refill.  I do not see the 300mg on her med list.  She states she takes both the 300 and the 400. If appropriate, please send to [facility]      Please contact patient at [phone]

## 2024-08-27 NOTE — TELEPHONE ENCOUNTER
Reason for call:   [x] Refill   [] Prior Auth  [] Other:     Office:   [] PCP/Provider -   [x] Specialty/Provider - psych/Esme Medina,      Medication:     lamoTRIgine (LaMICtal) 200 MG tablet       Dose/Frequency:        Lamotrigine 200m tablet in the morning , 1 tablet at night                      Quantity: 180    Pharmacy: [address] [place] [facility]. [phone]     Does the patient have enough for 3 days?   [x] Yes   [] No - Send as HP to POD

## 2024-08-28 RX ORDER — LAMOTRIGINE 200 MG/1
TABLET ORAL
Qty: 180 TABLET | Refills: 0 | Status: SHIPPED | OUTPATIENT
Start: 2024-08-28 | End: 2024-08-29 | Stop reason: SDUPTHER

## 2024-08-29 ENCOUNTER — OFFICE VISIT (OUTPATIENT)
Dept: FAMILY MEDICINE CLINIC | Facility: HOSPITAL | Age: 64
End: 2024-08-29
Payer: MEDICARE

## 2024-08-29 ENCOUNTER — APPOINTMENT (OUTPATIENT)
Dept: PHYSICAL THERAPY | Facility: CLINIC | Age: 64
End: 2024-08-29
Payer: MEDICARE

## 2024-08-29 VITALS
DIASTOLIC BLOOD PRESSURE: 70 MMHG | WEIGHT: 200 LBS | OXYGEN SATURATION: 99 % | HEART RATE: 70 BPM | HEIGHT: 64 IN | BODY MASS INDEX: 34.15 KG/M2 | SYSTOLIC BLOOD PRESSURE: 122 MMHG

## 2024-08-29 DIAGNOSIS — F31.81 BIPOLAR 2 DISORDER (HCC): Chronic | ICD-10-CM

## 2024-08-29 DIAGNOSIS — F42.9 OBSESSIVE-COMPULSIVE DISORDER, UNSPECIFIED TYPE: ICD-10-CM

## 2024-08-29 DIAGNOSIS — N61.1 ABSCESS OF RIGHT BREAST: Primary | ICD-10-CM

## 2024-08-29 DIAGNOSIS — F31.10 BIPOLAR I DISORDER, MOST RECENT EPISODE MANIC (HCC): ICD-10-CM

## 2024-08-29 PROCEDURE — G2211 COMPLEX E/M VISIT ADD ON: HCPCS | Performed by: INTERNAL MEDICINE

## 2024-08-29 PROCEDURE — 99213 OFFICE O/P EST LOW 20 MIN: CPT | Performed by: INTERNAL MEDICINE

## 2024-08-29 RX ORDER — LAMOTRIGINE 200 MG/1
TABLET ORAL
Qty: 180 TABLET | Refills: 3 | Status: SHIPPED | OUTPATIENT
Start: 2024-08-29

## 2024-08-29 RX ORDER — CEPHALEXIN 500 MG/1
500 CAPSULE ORAL EVERY 6 HOURS SCHEDULED
Qty: 20 CAPSULE | Refills: 0 | Status: SHIPPED | OUTPATIENT
Start: 2024-08-29 | End: 2024-09-03

## 2024-08-29 RX ORDER — CLONAZEPAM 0.5 MG/1
0.5 TABLET ORAL 3 TIMES DAILY
Qty: 270 TABLET | Refills: 0 | Status: SHIPPED | OUTPATIENT
Start: 2024-08-29

## 2024-08-29 RX ORDER — FLUVOXAMINE MALEATE 100 MG
TABLET ORAL
Qty: 270 TABLET | Refills: 1 | Status: SHIPPED | OUTPATIENT
Start: 2024-08-29

## 2024-08-29 NOTE — PROGRESS NOTES
Assessment/Plan:     Diagnosis ICD-10-CM Associated Orders   1. Abscess of right breast  N61.1 cephalexin (KEFLEX) 500 mg capsule      2. Bipolar I disorder, most recent episode manic (HCC)  F31.10 lamoTRIgine (LaMICtal) 200 MG tablet      3. Obsessive-compulsive disorder, unspecified type  F42.9 fluvoxaMINE (LUVOX) 100 mg tablet          Problem List Items Addressed This Visit        Behavioral Health    Bipolar I disorder, most recent episode manic (HCC)    Relevant Medications    lamoTRIgine (LaMICtal) 200 MG tablet    fluvoxaMINE (LUVOX) 100 mg tablet    Obsessive compulsive disorder    Relevant Medications    fluvoxaMINE (LUVOX) 100 mg tablet   Other Visit Diagnoses     Abscess of right breast    -  Primary    Relevant Medications    cephalexin (KEFLEX) 500 mg capsule            Return keep october appt.      Subjective:    Patient ID: Ayleen Moralez is a 64 y.o. female    Here for follow up about her breast cyst that was infected - gradually improving but still small area of firmness- will give additional 5  days of antibiotics- if not totally clearing will have her see surgery team but I am pleased with improvement.   Also has  new psych doctor to start next month with [facility]- will refill her lamotrigine and luvox until they are able to take over rx as her prior Dr. Mixon has left         The following portions of the patient's history were reviewed and updated as appropriate: allergies, current medications and problem list.     Review of Systems   Constitutional:  Negative for diaphoresis and fever.   Skin:         Still some breast tenderness on left side   All other systems reviewed and are negative.        Objective:      Current Outpatient Medications:   •  acetaminophen (TYLENOL) 500 mg tablet, Take 2 tablets (1,000 mg total) by mouth every 8 (eight) hours, Disp: 60 tablet, Rfl: 0  •  albuterol (Ventolin HFA) 90 mcg/act inhaler, Inhale 2 puffs every 6 (six) hours as needed for wheezing  or shortness of breath, Disp: 8.5 g, Rfl: 3  •  alendronate (Fosamax) 70 mg tablet, Take 1 tablet (70 mg total) by mouth every 7 days, Disp: 4 tablet, Rfl: 5  •  AMILoride 5 mg tablet, Take 1 tablet (5 mg total) by mouth 2 (two) times a day (Patient taking differently: Take 5 mg by mouth 2 (two) times a day), Disp: 180 tablet, Rfl: 3  •  amLODIPine (NORVASC) 5 mg tablet, Take 1 tablet (5 mg total) by mouth daily, Disp: 90 tablet, Rfl: 3  •  ascorbic acid (VITAMIN C) 500 MG tablet, Take 1 tablet (500 mg total) by mouth 2 (two) times a day, Disp: 60 tablet, Rfl: 2  •  aspirin 81 mg chewable tablet, Chew 1 tablet (81 mg total) 2 (two) times a day, Disp: 60 tablet, Rfl: 0  •  budesonide-formoterol (Symbicort) 160-4.5 mcg/act inhaler, Inhale 2 puffs 2 (two) times a day Rinse mouth after use., Disp: 10.2 g, Rfl: 11  •  carvedilol (COREG) 25 mg tablet, Take 1 tablet (25 mg total) by mouth 2 (two) times a day with meals, Disp: 180 tablet, Rfl: 3  •  cephalexin (KEFLEX) 500 mg capsule, Take 1 capsule (500 mg total) by mouth every 6 (six) hours for 7 days, Disp: 28 capsule, Rfl: 0  •  cephalexin (KEFLEX) 500 mg capsule, Take 1 capsule (500 mg total) by mouth every 6 (six) hours for 5 days, Disp: 20 capsule, Rfl: 0  •  cholecalciferol (VITAMIN D3) 1,000 units tablet, Take 5 tablets (5,000 Units total) by mouth daily, Disp: 90 tablet, Rfl: 5  •  clonazePAM (KlonoPIN) 0.5 mg tablet, TAKE 1 TABLET BY MOUTH THREE TIMES DAILY, Disp: 270 tablet, Rfl: 0  •  Cyanocobalamin (VITAMIN B 12 PO), Take 1,000 mcg by mouth in the morning, Disp: , Rfl:   •  ferrous sulfate 324 (65 Fe) mg, Take 1 tablet (324 mg total) by mouth 2 (two) times a day before meals, Disp: 60 tablet, Rfl: 2  •  fluvoxaMINE (LUVOX) 100 mg tablet, Fluvoxamine 100m tablet in the morning ; 2 tablets at night, Disp: 270 tablet, Rfl: 1  •  lamoTRIgine (LaMICtal) 200 MG tablet, Lamotrigine 200m tablet in the morning , 1 tablet at night, Disp: 180 tablet, Rfl: 3  •   "Multiple Vitamin (MULTI-VITAMIN) tablet, Take 1 tablet by mouth daily, Disp: 30 tablet, Rfl: 2  •  omeprazole (PriLOSEC) 40 MG capsule, TAKE 1 CAPSULE BY MOUTH DAILY  BEFORE BREAKFAST, Disp: 90 capsule, Rfl: 1  •  ondansetron (ZOFRAN) 4 mg tablet, Take 1 tablet (4 mg total) by mouth every 8 (eight) hours as needed for nausea or vomiting, Disp: 30 tablet, Rfl: 1  •  QUEtiapine (SEROquel) 400 MG tablet, TAKE 2 TABLETS BY MOUTH AT NIGHT (Patient taking differently: TAKE 2 TABLETS BY MOUTH AT NIGHT IN ADDITION  MG .), Disp: 60 tablet, Rfl: 0  •  rosuvastatin (CRESTOR) 20 MG tablet, Take 1 tablet (20 mg total) by mouth every evening, Disp: 90 tablet, Rfl: 3  •  traMADol (ULTRAM) 50 mg tablet, Take 1 tablet (50 mg total) by mouth 2 (two) times a day as needed for moderate pain, Disp: 60 tablet, Rfl: 0  •  folic acid (FOLVITE) 1 mg tablet, Take 1 tablet (1 mg total) by mouth daily (Patient not taking: Reported on 4/18/2024), Disp: 30 tablet, Rfl: 2    Blood pressure 122/70, pulse 70, height 5' 4\" (1.626 m), weight 90.7 kg (200 lb), SpO2 99%, not currently breastfeeding.     Physical Exam  Vitals and nursing note reviewed.   Constitutional:       Comments: Chronic slurred speech   HENT:      Head: Normocephalic.      Nose: No congestion.   Cardiovascular:      Rate and Rhythm: Normal rate and regular rhythm.      Heart sounds: No murmur heard.  Abdominal:      General: There is no distension.      Palpations: Abdomen is soft.      Tenderness: There is no abdominal tenderness.   Musculoskeletal:      Right lower leg: No edema.      Left lower leg: No edema.   Skin:     Findings: Erythema present.      Comments:  Less redness - no drainage   Neurological:      Mental Status: She is alert. Mental status is at baseline.      Comments: Some slow speech   Psychiatric:         Mood and Affect: Mood normal.        "

## 2024-08-29 NOTE — TELEPHONE ENCOUNTER
Reason for call:   [x] Refill   [] Prior Auth  [] Other:     Office:   [x] PCP/Provider - Bette Harp, DO   [] Specialty/Provider -     Medication:    clonazePAM (KlonoPIN) 0.5 mg tablet    Dose/Frequency:  TAKE 1 TABLET BY MOUTH THREE TIMES DAILY     Quantity: 270    Pharmacy: [facility] - [address] [phone]    Does the patient have enough for 3 days?   [x] Yes   [] No - Send as HP to POD

## 2024-08-30 ENCOUNTER — TELEPHONE (OUTPATIENT)
Dept: PSYCHIATRY | Facility: CLINIC | Age: 64
End: 2024-08-30

## 2024-08-30 DIAGNOSIS — M79.605 BILATERAL LEG PAIN: ICD-10-CM

## 2024-08-30 DIAGNOSIS — M54.50 LUMBAR PAIN: ICD-10-CM

## 2024-08-30 DIAGNOSIS — M79.604 BILATERAL LEG PAIN: ICD-10-CM

## 2024-08-30 RX ORDER — TRAMADOL HYDROCHLORIDE 50 MG/1
50 TABLET ORAL 2 TIMES DAILY PRN
Qty: 60 TABLET | Refills: 0 | Status: SHIPPED | OUTPATIENT
Start: 2024-08-30

## 2024-08-30 NOTE — TELEPHONE ENCOUNTER
Reason for call:   [x] Refill   [] Prior Auth  [] Other:     Office:   [x] PCP/Provider - Bette Fly, DO  [] Specialty/Provider -     Medication: traMADol (ULTRAM) 50 mg tablet     Dose/Frequency: 50 mg, Oral, 2 times daily PRN     Quantity: 60    Pharmacy: [facility] [place] - [address]     Does the patient have enough for 3 days?   [x] Yes   [] No - Send as HP to POD

## 2024-08-30 NOTE — TELEPHONE ENCOUNTER
Ayleen Moralez called and  requested a call back to discuss scheduling a MARLYS appt from Dr Medina.    They can be reached at P# [phone].       Thank you.

## 2024-08-30 NOTE — TELEPHONE ENCOUNTER
Sharon called the office stating she had to stop the Benztropine.  Medication was making her too tired. She is compliant with all her other medications.  She does not have a MARLYS appointment scheduled yet and is waiting for a call with date and time.    Will refer to Dr Garcia for review.

## 2024-09-03 ENCOUNTER — TELEPHONE (OUTPATIENT)
Dept: PSYCHIATRY | Facility: CLINIC | Age: 64
End: 2024-09-03

## 2024-09-03 DIAGNOSIS — F31.10 BIPOLAR I DISORDER, MOST RECENT EPISODE MANIC (HCC): ICD-10-CM

## 2024-09-03 DIAGNOSIS — F20.9 SCHIZOPHRENIA, UNSPECIFIED TYPE (HCC): ICD-10-CM

## 2024-09-03 RX ORDER — QUETIAPINE FUMARATE 400 MG/1
TABLET, FILM COATED ORAL
Qty: 60 TABLET | Refills: 0 | OUTPATIENT
Start: 2024-09-03
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
## 2024-09-03 NOTE — TELEPHONE ENCOUNTER
Writer called to confirm 9/4/24 appointment with Kimberly Mora. Writer called number on file and asked for Ayleen. A woman answering the phone said she is not Ayleen, and there is no Ayleen there.     Writer apologized and noted on the account to get a new number for client at appointment.

## 2024-09-03 NOTE — TELEPHONE ENCOUNTER
Medication Refill Request     Name of Medication Quetiapine  Dose/Frequency 400 mg/ Take 2 tablets by mouth at night  Quantity 60  Verified pharmacy   [x]  Verified ordering Provider   [x]  Does patient have enough for the next 3 days? Yes [] No [x]  Does patient have a follow-up appointment scheduled? Yes [x] No []   If so when is appointment: 9/4/2024

## 2024-09-04 ENCOUNTER — OFFICE VISIT (OUTPATIENT)
Dept: PSYCHIATRY | Facility: CLINIC | Age: 64
End: 2024-09-04
Payer: MEDICARE

## 2024-09-04 DIAGNOSIS — F41.1 GENERALIZED ANXIETY DISORDER: ICD-10-CM

## 2024-09-04 DIAGNOSIS — F31.4 SEVERE DEPRESSED BIPOLAR I DISORDER WITHOUT PSYCHOTIC FEATURES (HCC): Primary | ICD-10-CM

## 2024-09-04 PROBLEM — M70.50: Status: ACTIVE | Noted: 2023-11-14

## 2024-09-04 PROBLEM — M62.81 GENERALIZED MUSCLE WEAKNESS: Status: ACTIVE | Noted: 2023-11-14

## 2024-09-04 PROBLEM — R26.2 DIFFICULTY IN WALKING, NOT ELSEWHERE CLASSIFIED: Status: ACTIVE | Noted: 2023-11-14

## 2024-09-04 PROBLEM — F31.10 BIPOLAR I DISORDER, MOST RECENT EPISODE MANIC (HCC): Status: RESOLVED | Noted: 2024-06-17 | Resolved: 2024-09-04

## 2024-09-04 PROBLEM — Z91.81 HISTORY OF FALLING: Status: ACTIVE | Noted: 2023-11-14

## 2024-09-04 PROBLEM — F31.2: Status: RESOLVED | Noted: 2023-12-01 | Resolved: 2024-09-04

## 2024-09-04 PROBLEM — E66.01 MORBID (SEVERE) OBESITY DUE TO EXCESS CALORIES (HCC): Status: ACTIVE | Noted: 2023-11-14

## 2024-09-04 PROBLEM — G89.29 OTHER CHRONIC PAIN: Status: ACTIVE | Noted: 2023-11-14

## 2024-09-04 PROCEDURE — 99214 OFFICE O/P EST MOD 30 MIN: CPT

## 2024-09-05 DIAGNOSIS — I10 PRIMARY HYPERTENSION: ICD-10-CM

## 2024-09-06 DIAGNOSIS — R11.0 NAUSEA: ICD-10-CM

## 2024-09-06 RX ORDER — AMLODIPINE BESYLATE 5 MG/1
5 TABLET ORAL DAILY
Qty: 90 TABLET | Refills: 1 | Status: SHIPPED | OUTPATIENT
Start: 2024-09-06

## 2024-09-06 RX ORDER — ONDANSETRON 4 MG/1
4 TABLET, FILM COATED ORAL EVERY 8 HOURS PRN
Qty: 30 TABLET | Refills: 0 | Status: SHIPPED | OUTPATIENT
Start: 2024-09-06

## 2024-09-18 ENCOUNTER — SOCIAL WORK (OUTPATIENT)
Dept: BEHAVIORAL/MENTAL HEALTH CLINIC | Facility: CLINIC | Age: 64
End: 2024-09-18
Payer: MEDICARE

## 2024-09-18 DIAGNOSIS — F41.1 GENERALIZED ANXIETY DISORDER: ICD-10-CM

## 2024-09-18 DIAGNOSIS — F42.9 OBSESSIVE-COMPULSIVE DISORDER, UNSPECIFIED TYPE: ICD-10-CM

## 2024-09-18 DIAGNOSIS — I10 HTN (HYPERTENSION): ICD-10-CM

## 2024-09-18 DIAGNOSIS — F31.4 BIPOLAR DISORDER, CURRENT EPISODE DEPRESSED, SEVERE, WITHOUT PSYCHOTIC FEATURES (HCC): Primary | ICD-10-CM

## 2024-09-18 PROCEDURE — 90834 PSYTX W PT 45 MINUTES: CPT | Performed by: COUNSELOR

## 2024-09-18 RX ORDER — AMILORIDE HYDROCHLORIDE 5 MG/1
5 TABLET ORAL 2 TIMES DAILY
Qty: 180 TABLET | Refills: 1 | Status: SHIPPED | OUTPATIENT
Start: 2024-09-18

## 2024-09-18 NOTE — BH TREATMENT PLAN
"Outpatient Behavioral Health Psychotherapy Treatment Plan    Ayleen Moralez  1960     Date of Initial Psychotherapy Assessment: Many years ago   Date of Current Treatment Plan: 09/18/24  Treatment Plan Target Date: TBD  Treatment Plan Expiration Date: 3-18-25    Diagnosis:   1. Bipolar disorder, current episode depressed, severe, without psychotic features (HCC)        2. Generalized anxiety disorder        3. Obsessive-compulsive disorder, unspecified type            Area(s) of Need: Bipolar, Depression, OCD and Anxiety     Long Term Goal 1 (in the client's own words):   Work on depression management.  As of 9-18-24, Ayleen said she has more anxiety and feels more depressed due to finances, lives with her sister and sister is planning on moving in the near future and has now told her she can't move with them (sister and niece).     Stage of Change: Action     Target Date for completion: TBD             Anticipated therapeutic modalities: Mindfulness, Motivational Interviewing             People identified to complete this goal: Client                     Objective 1: (identify the means of measuring success in meeting the objective): I will feel happier.                    Objective 2: (identify the means of measuring success in meeting the objective): I will be motivated.     Long Term Goal 2 (in the client's own words): Better control over my anxiety.  Client said she is making a little progress with this goal but said the anxiety is currently heightened due to stressors mentioned in Goal 1 above.       Stage of Change: Action     Target Date for completion: TBD             Anticipated therapeutic modalities: Mindful, CBT             People identified to complete this goal: Client                    Objective 1: (identify the means of measuring success in meeting the objective): I will be more motivated to do things as opposed to being \"in a shell\" (more withdrawn)                    Objective 2: " (identify the means of measuring success in meeting the objective): N/A    Long Term Goal 3 (in the client's own words): I want my privacy back to help with my mood.     Stage of Change: Preparation     Target Date for completion: TBD             Anticipated therapeutic modalities: Problem solving             People identified to complete this goal: Client                    Objective 1: (identify the means of measuring success in meeting the objective): I will be able to go back upstairs to my own bed and my own room.                    Objective 2: (identify the means of measuring success in meeting the objective): N/A       I am currently under the care of a [facility] psychiatric provider: yes    My [facility] psychiatric provider is: EMERITA Barnes    I am currently taking psychiatric medications: Yes, as prescribed    I feel that I will be ready for discharge from mental health care when I reach the following (measurable goal/objective): When goals are reached.    For children and adults who have a legal guardian:   Has there been any change to custody orders and/or guardianship status? NA. If yes, attach updated documentation.    I have created my Crisis Plan and have been offered a copy of this plan    Behavioral Health Treatment Plan  Luke: Diagnosis and Treatment Plan explained to Ayleen Moralez acknowledges an understanding of their diagnosis. Ayleen Moralez agrees to this treatment plan.    I have been offered a copy of this Treatment Plan. yes

## 2024-09-19 ENCOUNTER — HOSPITAL ENCOUNTER (OUTPATIENT)
Dept: NON INVASIVE DIAGNOSTICS | Age: 64
Discharge: HOME | End: 2024-09-19
Payer: MEDICARE

## 2024-09-19 ENCOUNTER — TELEPHONE (OUTPATIENT)
Age: 64
End: 2024-09-19

## 2024-09-19 VITALS
WEIGHT: 200 LBS | HEART RATE: 67 BPM | BODY MASS INDEX: 34.15 KG/M2 | DIASTOLIC BLOOD PRESSURE: 70 MMHG | HEIGHT: 64 IN | SYSTOLIC BLOOD PRESSURE: 122 MMHG

## 2024-09-19 DIAGNOSIS — F42.9 OBSESSIVE-COMPULSIVE DISORDER, UNSPECIFIED TYPE: ICD-10-CM

## 2024-09-19 DIAGNOSIS — I35.0 NONRHEUMATIC AORTIC VALVE STENOSIS: ICD-10-CM

## 2024-09-19 LAB
AORTIC ROOT: 3.7 CM
AORTIC VALVE MEAN VELOCITY: 20.8 M/S
APICAL FOUR CHAMBER EJECTION FRACTION: 67 %
AV AREA BY CONTINUOUS VTI: 1.2 CM2
AV AREA PEAK VELOCITY: 1.2 CM2
AV LVOT MEAN GRADIENT: 4 MMHG
AV LVOT PEAK GRADIENT: 6 MMHG
AV MEAN GRADIENT: 20 MMHG
AV PEAK GRADIENT: 40 MMHG
AV VALVE AREA: 1.16 CM2
AV VELOCITY RATIO: 0.39
BSA FOR ECHO PROCEDURE: 1.96 M2
DOP CALC AO PEAK VEL: 3.16 M/S
DOP CALC AO VTI: 73 CM
DOP CALC LVOT AREA: 3.14 CM2
DOP CALC LVOT CARDIAC INDEX: 2.91 L/MIN/M2
DOP CALC LVOT CARDIAC OUTPUT: 5.71 L/MIN
DOP CALC LVOT DIAMETER: 2 CM
DOP CALC LVOT PEAK VEL VTI: 27 CM
DOP CALC LVOT PEAK VEL: 1.23 M/S
DOP CALC LVOT STROKE INDEX: 44.4 ML/M2
DOP CALC LVOT STROKE VOLUME: 84.78 CM3
DOP CALC MV VTI: 28.17 CM
E WAVE DECELERATION TIME: 209 MS
E/A RATIO: 1.96
FRACTIONAL SHORTENING: 37 (ref 28–44)
INTERVENTRICULAR SEPTUM IN DIASTOLE (PARASTERNAL SHORT AXIS VIEW): 1.2 CM
INTERVENTRICULAR SEPTUM: 1.2 CM (ref 0.6–1.1)
LAAS-AP2: 23.5 CM2
LAAS-AP4: 20.7 CM2
LEFT ATRIUM SIZE: 4.2 CM
LEFT ATRIUM VOLUME (MOD BIPLANE): 69 ML
LEFT ATRIUM VOLUME INDEX (MOD BIPLANE): 35.2 ML/M2
LEFT INTERNAL DIMENSION IN SYSTOLE: 2.9 CM (ref 2.1–4)
LEFT VENTRICLE DIASTOLIC VOLUME (MOD BIPLANE): 124 ML
LEFT VENTRICLE DIASTOLIC VOLUME INDEX (MOD BIPLANE): 63.3 ML/M2
LEFT VENTRICLE SYSTOLIC VOLUME (MOD BIPLANE): 42 ML
LEFT VENTRICLE SYSTOLIC VOLUME INDEX (MOD BIPLANE): 21.4 ML/M2
LEFT VENTRICULAR INTERNAL DIMENSION IN DIASTOLE: 4.6 CM (ref 3.5–6)
LEFT VENTRICULAR POSTERIOR WALL IN END DIASTOLE: 0.7 CM
LEFT VENTRICULAR STROKE VOLUME: 66 ML
LV EF: 66 %
LVSV (TEICH): 66 ML
MV E'TISSUE VEL-LAT: 12 CM/S
MV E'TISSUE VEL-SEP: 9 CM/S
MV MEAN GRADIENT: 2 MMHG
MV PEAK A VEL: 0.54 M/S
MV PEAK E VEL: 106 CM/S
MV PEAK GRADIENT: 6 MMHG
MV STENOSIS PRESSURE HALF TIME: 61 MS
MV VALVE AREA BY CONTINUITY EQUATION: 3.01 CM2
MV VALVE AREA P 1/2 METHOD: 3.61
RIGHT ATRIAL 2D VOLUME: 43 ML
RIGHT ATRIUM AREA SYSTOLE A4C: 16.4 CM2
RIGHT VENTRICLE ID DIMENSION: 3.7 CM
SL CV LEFT ATRIUM LENGTH A2C: 5.9 CM
SL CV LV EF: 65
SL CV PED ECHO LEFT VENTRICLE DIASTOLIC VOLUME (MOD BIPLANE) 2D: 97 ML
SL CV PED ECHO LEFT VENTRICLE SYSTOLIC VOLUME (MOD BIPLANE) 2D: 32 ML
TR MAX PG: 24 MMHG
TR PEAK VELOCITY: 2.5 M/S
TRICUSPID ANNULAR PLANE SYSTOLIC EXCURSION: 1.8 CM
TRICUSPID VALVE PEAK REGURGITATION VELOCITY: 2.46 M/S

## 2024-09-19 PROCEDURE — 93306 TTE W/DOPPLER COMPLETE: CPT | Performed by: INTERNAL MEDICINE

## 2024-09-19 PROCEDURE — 93306 TTE W/DOPPLER COMPLETE: CPT

## 2024-09-19 NOTE — TELEPHONE ENCOUNTER
Reason for call:   [x] Refill   [] Prior Auth  [] Other:     Office:   [x] PCP/Provider -   [] Specialty/Provider -     Medication:  fluvoxaMINE (LUVOX) 100 mg tablet    Dose/Frequency: : 1 tablet in the morning ; 2 tablets at night,     Quantity: 270    Pharmacy: [facility] - [address]      Does the patient have enough for 3 days?   [x] Yes   [] No - Send as HP to POD

## 2024-09-19 NOTE — TELEPHONE ENCOUNTER
Patient called the refill line for a refill on fluvoxaMINE (LUVOX) 100 mg tablet and fluvoxaMINE (LUVOX) 300 mg tablet. I do not see the 300 mg on the patient medication list. I did submit a refill request for the 100 mg. Please review and if refills are appropriate. Send to [facility] - [place], [place] - [address]

## 2024-09-20 RX ORDER — FLUVOXAMINE MALEATE 100 MG
TABLET ORAL
Qty: 270 TABLET | Refills: 0 | Status: SHIPPED | OUTPATIENT
Start: 2024-09-20

## 2024-09-20 NOTE — TELEPHONE ENCOUNTER
Patient returned called.   Patient states that she did not need fluvxamine.  Patient states that she needs Quetiapine 300mg and quetiappine 100mg.   Patient states that she takes 300mg in the morning and 500mg at night. Only the 400mg is listed as active med    Please review and send scripts for 100mg and 300mg to [facility] if appropriate.

## 2024-09-20 NOTE — TELEPHONE ENCOUNTER
Left message for patient to call back to clarify dosage she is taking. She does not have a 300 mg dose on her med list. Current medication list shows 100 mg tablet taking 3 per day for a daily total dose of 300 mg.

## 2024-09-23 NOTE — TELEPHONE ENCOUNTER
Attempted to call Ayleen to verify dosage of Seroquel without success. Will try again at a later time.

## 2024-09-24 DIAGNOSIS — F31.10 BIPOLAR I DISORDER, MOST RECENT EPISODE MANIC (HCC): ICD-10-CM

## 2024-09-24 DIAGNOSIS — F31.30 BIPOLAR AFFECTIVE DISORDER, CURRENT EPISODE DEPRESSED, CURRENT EPISODE SEVERITY UNSPECIFIED (HCC): Primary | ICD-10-CM

## 2024-09-24 DIAGNOSIS — F20.9 SCHIZOPHRENIA, UNSPECIFIED TYPE (HCC): ICD-10-CM

## 2024-09-24 DIAGNOSIS — J45.40 MODERATE PERSISTENT ASTHMA WITHOUT COMPLICATION: Primary | ICD-10-CM

## 2024-09-24 RX ORDER — QUETIAPINE FUMARATE 400 MG/1
400 TABLET, FILM COATED ORAL
Qty: 90 TABLET | Refills: 0 | Status: SHIPPED | OUTPATIENT
Start: 2024-09-24

## 2024-09-24 RX ORDER — QUETIAPINE FUMARATE 300 MG/1
TABLET, FILM COATED ORAL
Qty: 90 TABLET | Refills: 0 | Status: SHIPPED | OUTPATIENT
Start: 2024-09-24

## 2024-09-24 RX ORDER — MONTELUKAST SODIUM 10 MG/1
10 TABLET ORAL
Qty: 30 TABLET | Refills: 5 | Status: SHIPPED | OUTPATIENT
Start: 2024-09-24

## 2024-09-24 RX ORDER — QUETIAPINE FUMARATE 400 MG/1
TABLET, FILM COATED ORAL
Qty: 60 TABLET | Refills: 0 | OUTPATIENT
Start: 2024-09-24

## 2024-09-24 RX ORDER — QUETIAPINE FUMARATE 100 MG/1
100 TABLET, FILM COATED ORAL
Qty: 90 TABLET | Refills: 0 | Status: SHIPPED | OUTPATIENT
Start: 2024-09-24

## 2024-09-24 NOTE — TELEPHONE ENCOUNTER
BRAN Abbasi- looking to verify exact dose of Seroquel. Last appointment with Dr Medina dated 6/17/24 she was prescribed Seroquel 400 mg (take 2 at bedtime). Looking to see when this as changed. Will await a return call.

## 2024-09-24 NOTE — TELEPHONE ENCOUNTER
Spoke with Ayleen- She states she has never taken the Seroquel like Dr Medina previously prescribed. She takes Seroquel 300 mg daily in the morning and 400 mg + 100 mg Seroquel to total 500 mg at bedtime. Sent to [facility] as requested.

## 2024-10-01 DIAGNOSIS — F42.9 OBSESSIVE-COMPULSIVE DISORDER, UNSPECIFIED TYPE: ICD-10-CM

## 2024-10-01 RX ORDER — FLUVOXAMINE MALEATE 100 MG
TABLET ORAL
Qty: 270 TABLET | Refills: 0 | OUTPATIENT
Start: 2024-10-01

## 2024-10-01 RX ORDER — FLUVOXAMINE MALEATE 100 MG
TABLET ORAL
Qty: 270 TABLET | Refills: 0 | Status: SHIPPED | OUTPATIENT
Start: 2024-10-01

## 2024-10-01 NOTE — TELEPHONE ENCOUNTER
Patient states psychiatry is supposed to be prescribing this medication. Not a duplicate, pharmacy change       Reason for call:   [x] Refill   [] Prior Auth  [] Other:     Office:   [] PCP/Provider -   [x] Specialty/Provider - Psych     Medication: Fluvoxamine 100 mg, 1 tablet in the morning 2 tablets at night      Pharmacy: [facility]     Does the patient have enough for 3 days?   [x] Yes   [] No - Send as HP to POD

## 2024-10-04 DIAGNOSIS — M79.605 BILATERAL LEG PAIN: ICD-10-CM

## 2024-10-04 DIAGNOSIS — M54.50 LUMBAR PAIN: ICD-10-CM

## 2024-10-04 DIAGNOSIS — M79.604 BILATERAL LEG PAIN: ICD-10-CM

## 2024-10-04 RX ORDER — TRAMADOL HYDROCHLORIDE 50 MG/1
50 TABLET ORAL 2 TIMES DAILY PRN
Qty: 60 TABLET | Refills: 0 | Status: SHIPPED | OUTPATIENT
Start: 2024-10-04

## 2024-10-04 NOTE — TELEPHONE ENCOUNTER
Reason for call:   [x] Refill   [] Prior Auth  [] Other:     Office:   [x] PCP/Provider -   [] Specialty/Provider -     Medication: traMADol (ULTRAM) 50 mg     Dose/Frequency: Take 1 tablet (50 mg total) by mouth 2 (two) times a day as needed     Quantity: 60    Pharmacy: [facility] [place] - [address] [facility]     Does the patient have enough for 3 days?   [x] Yes   [] No - Send as HP to POD

## 2024-10-05 DIAGNOSIS — N18.30 CHRONIC KIDNEY DISEASE, STAGE 3 (HCC): ICD-10-CM

## 2024-10-05 DIAGNOSIS — N28.1 RENAL CYST: ICD-10-CM

## 2024-10-05 DIAGNOSIS — N25.81 SECONDARY RENAL HYPERPARATHYROIDISM (HCC): ICD-10-CM

## 2024-10-05 DIAGNOSIS — E23.2 DIABETES INSIPIDUS (HCC): ICD-10-CM

## 2024-10-05 DIAGNOSIS — I10 ESSENTIAL HYPERTENSION: ICD-10-CM

## 2024-10-07 ENCOUNTER — TELEPHONE (OUTPATIENT)
Age: 64
End: 2024-10-07

## 2024-10-07 ENCOUNTER — TELEPHONE (OUTPATIENT)
Dept: FAMILY MEDICINE CLINIC | Facility: HOSPITAL | Age: 64
End: 2024-10-07

## 2024-10-07 RX ORDER — CARVEDILOL 25 MG/1
25 TABLET ORAL 2 TIMES DAILY WITH MEALS
Qty: 180 TABLET | Refills: 1 | Status: SHIPPED | OUTPATIENT
Start: 2024-10-07

## 2024-10-07 NOTE — TELEPHONE ENCOUNTER
Patient called back. Patient states the pharmacy told her that they need a letter to be sent and that it is done every 6 months. Patient is asking if someone could call the pharmacy to find out what the letter needs to say.

## 2024-10-07 NOTE — TELEPHONE ENCOUNTER
Patient called about her refill for Tramadol. She stated that the pharmacy was requesting paperwork in order to refill the medication, The patient did not know what kind of paperwork and there were no notes in the patient's profile about this. Informed patient the script was sent on 10/4/24 to [facility]. Told patient if the pharmacy still had questions they could call the office.

## 2024-10-07 NOTE — TELEPHONE ENCOUNTER
Kerwin from [facility] pharmacy calling to confirm patient's last office visit in order to fill script of tramadol that was sent on 10/4. Made him aware she was last seen on 8/29.

## 2024-10-07 NOTE — TELEPHONE ENCOUNTER
Spoke to [facility]. They just needed the date of the patients last office visit. Refill has been completed and patient is aware

## 2024-10-07 NOTE — PSYCH
Psychiatric Medication Management - Behavioral Health   Ayleen Moralez 64 y.o. female MRN: [id number]    This note was not shared with the patient due to reasonable likelihood of causing patient harm     Reason for Visit: Follow-up for medication management    Assessment & Plan  Bipolar disorder, current episode depressed, severe, without psychotic features (HCC)         Anxiety disorder, unspecified type         Obsessive-compulsive disorder, unspecified type         Bipolar I disorder, most recent episode manic (HCC)    Orders:    lamoTRIgine (LaMICtal) 200 MG tablet; Lamotrigine 200m tablet in the morning , 1 tablet at night         Assessment/Plan:     Impression:  Bipolar Disorder, depressed without psychotic features  Generalized Anxiety Disorder  R/O OCD     Continue Seroquel 400 mg and 100 mg (to total 500 mg) at bedtime for mood  Continue Seroquel 300 mg daily for mood  Continue Luvox 100 mg (takes 1 in the AM and 2 at night) for depression and anxiety  Continue Lamictal 200 mg (takes 1 in the AM and 1 at night) for mood  Recommend outpatient therapy-Will continue with Arabella at [facility]   Medical follow up with PCP as needed  Aware of 24 hour and weekend coverage for urgent situations accessed by calling [facility] Outpatient main practice number  Follow up in 3 months     Treatment Plan:  Next treatment plan due 3/18/2025. Next crisis plan due 2025.     Treatment Recommendations:      Risks, Benefits And Possible Side Effects Of Medications:  Risks, benefits, and possible side effects of medications explained to patient and family, they verbalize understanding    Controlled Medication Discussion: No records found for controlled prescriptions according to [place] Prescription Drug Monitoring Program.        Subjective:  Medication compliance: Yes  Medication side effects: Denies    HPI   Ayleen is a 64 year old female being seen today for follow-up for  "medication management.  Patient has psychiatric diagnoses including schizophrenia,  bipolar 1 disorder. Patient is currently being observed on Seroquel 400 mg and 100 mg at  at bedtime, Seroquel 300 mg in the morning, Luvox 100 mg  (takes 1 in the morning and 2 at night ), Lamictal 200 mg  (takes 1 in the morning and 1 at night ). Patient is currently connected connected to outpatient therapy with Nancy Mallory at [facility]. No additional services in place at this time.     Ayleen reports medication compliance and denies any side effects at this time.  She reports she got a new 2025 [employer] vehicle and was excited about this.  She states her mood is \"a little depressed\".  She reports frequent awakenings at night as she is sleeping downstairs in the family room, however reports she is moving up to her bedroom back upstairs.  Energy levels and motivation are okay and her appetite is robust.  Ayleen adamantly denies any suicidal thoughts, plan, or intent.  She denies any HI, AH, or VH.  She does not endorse any aggression but does states she feels cranky at times due to her living situation, but in the same sentence states she would be lonely without them.  She does report mild mood swings 3 days out of the week.  Ayleen also states she often feels her niece and sister are talking about her as they do not really get along, however this is unclear if she is paranoid or it is really occurring.  Ayleen denies any frequent crying spells or any elevated moods at this time.  She rates her anxiety and depression both an 8/10 on the severity scale with 10 being the most severe, but states she feels her medications are beneficial and does not want any changes at this time.  No medication changes at this time    Review Of Systems:     Constitutional Negative   ENT Negative   Cardiovascular Negative   Respiratory Negative   Gastrointestinal Negative   Genitourinary Negative   Musculoskeletal Negative   Integumentary " Negative   Neurological Negative   Endocrine Negative     Past Medical History:   Patient Active Problem List   Diagnosis    Pure hypercholesterolemia, unspecified    Spondylolisthesis at L5-S1 level    Renal cyst    Vitamin D deficiency, unspecified    Secondary hyperparathyroidism of renal origin ([facility])    Herniated nucleus pulposus, L5-S1    Other chronic pancreatitis ([facility])    Primary osteoarthritis of right knee    Age-related osteoporosis without current pathological fracture    Rectal prolapse    Elevated LFTs    Essential (primary) hypertension    Hyperprolactinemia ([facility])    Obsessive-compulsive disorder, unspecified    Panic disorder (episodic paroxysmal anxiety)    Eating disorder    Hyperparathyroidism, unspecified ([facility])    Other pericardial effusion (noninflammatory)    Benign neoplasm of colon    Drug-induced constipation    Cerebral infarction, unspecified ([facility])    Abnormal chest CT    Hyperphosphatemia    Abnormal thyroid exam    Thyroid nodule    Moderate persistent asthma, uncomplicated    Unilateral primary osteoarthritis, left knee    Steal syndrome dialysis vascular access ([facility])    Fistula, left hand    Right patellofemoral syndrome    Chronic kidney disease, unspecified    Bilateral sacroiliitis ([facility])    Hallux valgus, right    Acquired hallux interphalangeus of right foot    Effusion of right knee    Left knee tendonitis    Family history of cardiac disorder in father    Unspecified mood (affective) disorder ([facility])    Claw toe, acquired, right    Injury of plantar plate, right, initial encounter    Acquired hallux interphalangeus, right    Closed Colles' fracture of right radius    Aftercare following surgery of the musculoskeletal system    Acute shoulder pain    Essential hypertension    Gastro-esophageal reflux disease without esophagitis    Injury of right thumb    Pain of right upper extremity    Opioid dependence, uncomplicated ([facility])    Mixed obsessional thoughts and acts    Eating disorder,  unspecified    Other chronic pain    Pes anserinus bursitis of both knees    Umbilical hernia    Obesity (BMI 30-39.9)    Concussion with no loss of consciousness    Pulmonary embolism with infarction (HCC)    Interstitial pulmonary disease, unspecified (HCC)    Statin intolerance    Nonrheumatic aortic (valve) stenosis    Anxiety disorder, unspecified    Primary localized osteoarthritis of knees, bilateral    Acute pain of left knee    Aftercare following left knee joint replacement surgery    Contusion of buttock    SPENCER (obstructive sleep apnea)    Other bursitis of knee, unspecified knee    Bipolar disorder, current episode depressed, severe, without psychotic features (HCC)    Difficulty in walking, not elsewhere classified    Generalized muscle weakness    History of falling    Morbid (severe) obesity due to excess calories (HCC)    Bipolar disorder, current episode manic severe with psychotic features ([facility])    Hyperosmolality and hypernatremia    Hypokalemia    Intervertebral disc disorders with radiculopathy, lumbar region    Low back pain, unspecified    Major depressive disorder, single episode, unspecified    Presence of left artificial knee joint   Seizure history- Denies     Allergies:   Allergies   Allergen Reactions    Molds & Smuts Nasal Congestion    Bee Pollen Nasal Congestion     Other reaction(s): Nasal Congestion    Pollen Extract Nasal Congestion       Past Surgical History:   Past Surgical History:   Procedure Laterality Date    BUNIONECTOMY      Left foot     CAST APPLICATION Right 2022    Procedure: Application short-arm thumb spica splint;  Surgeon: Lyndon Victoria MD;  Location:  MAIN OR;  Service: Orthopedics    COLON SURGERY      COLONOSCOPY  2021    DILATION AND CURETTAGE OF UTERUS      INDUCED       surgically induced    OH ARTERIOVENOUS ANASTOMOSIS OPEN DIRECT Left 2019    Procedure: CREATION FISTULA ARTERIOVENOUS (AV) left wrists possible left upper;   Surgeon: Andrey Quintero MD;  Location: [facility]IN [facility];  Service: Vascular    NH ARTHRP KNE CONDYLE&PLATU MEDIAL&LAT COMPARTMENTS Left 2/5/2024    Procedure: ARTHROPLASTY KNEE TOTAL SAME DAY;  Surgeon: Riki Ma MD;  Location: [facility];  Service: Orthopedics    NH CORRJ HLX VLGS BNCTY SESMDC DSTL METAR OSTEOT Left 7/1/2019    Procedure: Serge bunionectomy;  Surgeon: Munir Larkin DPM;  Location: [facility];  Service: Podiatry    NH CORRJ HLX VLGS BNCTY SESMDC DSTL METAR OSTEOT Right 8/3/2020    Procedure: BUNIONECTOMY RAFAEL;  Surgeon: Munir Larkin DPM;  Location: [facility];  Service: Podiatry    NH CORRJ HLX VLGS BNCTY SESMDC PROX PHLX OSTEOT Right 9/27/2021    Procedure: BUNIONECTOMY TAMEKA, right tameka osteotomy and 2nd claw toe correction;  Surgeon: James R Lachman, MD;  Location: [facility];  Service: Orthopedics    NH ERCP DX COLLECTION SPECIMEN BRUSHING/WASHING N/A 4/11/2018    Procedure: ENDOSCOPIC RETROGRADE CHOLANGIOPANCREATOGRAPHY (ERCP);  Surgeon: Alfredo Messina MD;  Location: [facility];  Service: Gastroenterology    NH LAPAROSCOPY PROCTOPEXY PROLAPSE N/A 7/13/2018    Procedure: ROBOTIC SIGMOID RESECTION / RECTOPEXY;  Surgeon: ELPIDIO Mcnulty MD;  Location: [facility];  Service: Colorectal    NH OPEN TREATMENT RADIAL SHAFT FRACTURE Right 10/11/2021    Procedure: OPEN REDUCTION W/ INTERNAL FIXATION (ORIF) RADIUS (WRIST), RIGHT DISTAL;  Surgeon: Riki Ma MD;  [facility]: [facility];  Service: Orthopedics    NH REMOVAL IMPLANT DEEP Right 6/23/2022    Procedure: Removal of hardware volar aspect right distal radius (distal radial plate and screws);  Surgeon: Lyndon Victoria MD;  [facility]: [facility];  Service: Orthopedics    NH SIGMOIDOSCOPY FLX DX W/COLLJ SPEC BR/WA IF PFRMD N/A 7/13/2018    Procedure: SIGMOIDOSCOPY FLEXIBLE;  Surgeon: ELPIDIO Mcnulty MD;  [facility]: [facility];  Service: Colorectal    NH TR TDN RESTORE INTRNSC FUNCJ ALL 4 FNGRS Right 6/23/2022    Procedure: Right ring  finger flexor digitorum superficialis to flexor pollicis longus tendon transfer;  Surgeon: Lyndon Victoria MD;  Location:  MAIN OR;  Service: Orthopedics    TUBAL LIGATION Bilateral 1997    US GUIDED THYROID BIOPSY  7/30/2019       Past Psychiatric History:   Past Inpatient Psychiatric Treatment:   [facility]- [facility]- Manic depressive- bipolar  [facility] a [facility]  [facility]- [phone] didn't belong in there- School phobia  [facility]-15 years ago- Overdose on medications  Past Outpatient Psychiatric Treatment:    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  Dr Veronica Medina  Past Suicide Attempts: yes, by overdose on medications, 3 times overdosed- Last SA was approx 12 years ago  Past Violent Behavior: no  Past Psychiatric Medication Trials: Haldol- too tired, Cogentin- drowsy, Risperidone- unsure, Lithium- Kidney Disease, Abilify- unsure, Clozapine- long time ago,     Family Psychiatric History:   Mother- Depression- ECT in past  Sister- Depression    Social History:   Lives with-Sister- Radha    Kids-2- boys- grown- no relationship   Occupation- [employer]   Hobbies- None    Denies use of alcohol, nicotine, tobacco, caffeine,  or other illicit drugs.    Marijuana- long time ago- last smoked- 20 years ago    Traumatic History:    Denies history of physical, verbal, sexual, and emotional abuse.    The following portions of the patient's history were reviewed and updated as appropriate: allergies, current medications, past family history, past medical history, past social history, past surgical history, and problem list.    Objective:  There were no vitals filed for this visit.      Weight (last 2 days)       None            Labs:   No results found. However, due to the size of the patient record, not all encounters were searched. Please check Results Review for a complete set of results.    Mental status:  Appearance sitting comfortably in chair, cooperative with interview, good eye contact,  "unkempt   Mood \"Depressed\"   Affect Appears generally euthymic, stable, mood-congruent   Speech Normal rate, rhythm, and volume   Thought Processes Linear and goal directed   Associations intact associations   Hallucinations Denies any auditory or visual hallucinations   Thought Content No passive or active suicidal or homicidal ideation, intent, or plan.   Orientation Oriented to person, place, time, and situation   Recent and Remote Memory Grossly intact   Attention Span and Concentration Concentration intact   Intellect Appears to be of Average Intelligence   Insight Insight intact   Judgement judgment was intact   Muscle Strength Muscle strength and tone were normal and Normal gait    Language Within normal limits   Fund of Knowledge Age appropriate   Pain None     PHQ-A Depression Screening                     Visit Time    Visit Start Time: 11:00 AM   Visit Stop Time: 11 :17 AM  Total Visit Duration:  17 minutes    This note may have been written with the assistance of dictation software. Please excuse any grammatical  errors, misspellings,  and abnormal spacing of letters, sentences or paragraphs .     EMERITA Lynn  10/17/24      "

## 2024-10-08 ENCOUNTER — TELEPHONE (OUTPATIENT)
Age: 64
End: 2024-10-08

## 2024-10-09 ENCOUNTER — CONSULT (OUTPATIENT)
Age: 64
End: 2024-10-09
Payer: MEDICARE

## 2024-10-09 ENCOUNTER — TELEPHONE (OUTPATIENT)
Age: 64
End: 2024-10-09

## 2024-10-09 VITALS
BODY MASS INDEX: 34.45 KG/M2 | OXYGEN SATURATION: 98 % | HEART RATE: 90 BPM | HEIGHT: 64 IN | WEIGHT: 201.8 LBS | SYSTOLIC BLOOD PRESSURE: 124 MMHG | DIASTOLIC BLOOD PRESSURE: 70 MMHG | TEMPERATURE: 97.8 F

## 2024-10-09 DIAGNOSIS — Z78.0 POSTMENOPAUSAL: ICD-10-CM

## 2024-10-09 DIAGNOSIS — M81.0 AGE-RELATED OSTEOPOROSIS WITHOUT CURRENT PATHOLOGICAL FRACTURE: Primary | ICD-10-CM

## 2024-10-09 DIAGNOSIS — E55.9 VITAMIN D DEFICIENCY: ICD-10-CM

## 2024-10-09 PROCEDURE — 99204 OFFICE O/P NEW MOD 45 MIN: CPT | Performed by: PHYSICIAN ASSISTANT

## 2024-10-09 NOTE — TELEPHONE ENCOUNTER
Dr. Bravo advised pt to see if Dr. Harp would be able/willing to administer her Prolia shots moving forward.    Please contact pt with an update.

## 2024-10-09 NOTE — ASSESSMENT & PLAN NOTE
Continue over-the-counter vitamin D3 supplement.  Will check vitamin D with labs.    Orders:    Vitamin D 25 hydroxy; Future

## 2024-10-09 NOTE — ASSESSMENT & PLAN NOTE
Osteoporosis with history of left ankle fracture and right distal radius fracture.  She is currently being treated with Fosamax.  Given her chronic kidney disease and worsening reflux I recommended discontinuing Fosamax.  We discussed alternative treatment options including Prolia and Evenity given her fracture history.  Both medications were discussed in detail including risks, benefits, administration, and indications.  She would prefer to start Prolia.  She is interested in seeing if she can get her injections at her PCPs office as it is much closer to home.  If so I would recommend initiating Prolia therapy every 6 months as soon as insurance approval is done.  She is up-to-date with her DEXA scan and will be due for an updated DEXA in December 2025.  Recommend monitoring bone density every 2 years.  I have also given her a prescription for labs as part of a metabolic workup to rule out any secondary causes of osteoporosis.  Recommend she have labs done now and we can plan to start Prolia after labs are done.    Orders:    Ambulatory Referral to Rheumatology    Alkaline phosphatase, bone specific; Future    PTH, intact; Future    Vitamin D 25 hydroxy; Future    Protein Electrophoresis with Interpretation, Serum with Reflex to JACOB, Serum; Future    IMMUNOFIXATION (JACOB) AND PROTEIN ELECTROPHORESIS, RANDOM URINE    Celiac Antibodies Profile; Future    TSH w/Reflex; Future

## 2024-10-09 NOTE — PROGRESS NOTES
Ambulatory Visit  Name: Ayleen Moralez      : 1960      MRN: [id number]  Encounter Provider: Abi Bravo PA-C  Encounter Date: 10/9/2024   Encounter department: [facility] RHEUMATOLOGY [facility]    Assessment & Plan  Age-related osteoporosis without current pathological fracture  Osteoporosis with history of left ankle fracture and right distal radius fracture.  She is currently being treated with Fosamax.  Given her chronic kidney disease and worsening reflux I recommended discontinuing Fosamax.  We discussed alternative treatment options including Prolia and Evenity given her fracture history.  Both medications were discussed in detail including risks, benefits, administration, and indications.  She would prefer to start Prolia.  She is interested in seeing if she can get her injections at her PCPs office as it is much closer to home.  If so I would recommend initiating Prolia therapy every 6 months as soon as insurance approval is done.  She is up-to-date with her DEXA scan and will be due for an updated DEXA in 2025.  Recommend monitoring bone density every 2 years.  I have also given her a prescription for labs as part of a metabolic workup to rule out any secondary causes of osteoporosis.  Recommend she have labs done now and we can plan to start Prolia after labs are done.    Orders:    Ambulatory Referral to Rheumatology    Alkaline phosphatase, bone specific; Future    PTH, intact; Future    Vitamin D 25 hydroxy; Future    Protein Electrophoresis with Interpretation, Serum with Reflex to JACOB, Serum; Future    IMMUNOFIXATION (JACOB) AND PROTEIN ELECTROPHORESIS, RANDOM URINE    Celiac Antibodies Profile; Future    TSH w/Reflex; Future    Vitamin D deficiency  Continue over-the-counter vitamin D3 supplement.  Will check vitamin D with labs.    Orders:    Vitamin D 25 hydroxy; Future    Postmenopausal    Orders:    Ambulatory Referral to Rheumatology      History  of Present Illness     Ayleen Moralez is a 64 y.o. female.  She is here for new patient evaluation for history of osteoporosis.  She had a DEXA scan done on 12/27/2023.  Lumbar spine T-score -1.9.  This has decreased 5.9% as compared to her previous scan.  Left total hip T-score -1.5, femoral neck -2.0.  Left forearm T-score -2.6.  She has a history of a right distal radius fracture.  This occurred after falling onto an outstretched hand.  This was repaired surgically in October 2021.  She also has a history of a left ankle fracture.  She has previously been treated with Fosamax.  She was initially started on Fosamax sometime in 2021 however it was discontinued for a period of time.  She does note GI upset and worsening reflux symptoms with taking it.    She follows with cardiology for history of hypertension, hyperlipidemia, and history of cardiac murmur.  She has a history of hyperprolactinemia and has been seen by endocrinology in the past.    She follows with GI for history of GERD.  She also has a history of constipation.  She follows with nephrology for history of stage IV kidney disease.  She has secondary hyperparathyroidism due to her kidney disease.  She has a history of bipolar disorder.    She has a history of generalized osteoarthritis.  She has a history of lumbar degenerative disc disease as well and has seen pain management in the past.  She had a left total knee replacement.     She follows with pulmonology for obstructive sleep apnea along with moderate asthma.          Review of Systems   Constitutional:  Negative for chills, fatigue and fever.   HENT:  Negative for hearing loss, sore throat and tinnitus.         Dry mouth - medication related   Eyes:  Negative for pain and visual disturbance.   Respiratory:  Negative for cough and shortness of breath.         Asthma-stable   Cardiovascular:  Negative for chest pain and palpitations.   Gastrointestinal:  Negative for abdominal pain, nausea  and vomiting.        GERD   Genitourinary:  Negative for difficulty urinating.   Musculoskeletal:  Positive for arthralgias and back pain. Negative for gait problem, joint swelling, myalgias, neck pain and neck stiffness.   Skin:  Negative for rash.   Neurological:  Negative for dizziness, seizures, weakness, numbness and headaches.   Psychiatric/Behavioral:  Negative for confusion, decreased concentration and sleep disturbance.      Current Outpatient Medications on File Prior to Visit   Medication Sig Dispense Refill    acetaminophen (TYLENOL) 500 mg tablet Take 2 tablets (1,000 mg total) by mouth every 8 (eight) hours 60 tablet 0    albuterol (Ventolin HFA) 90 mcg/act inhaler Inhale 2 puffs every 6 (six) hours as needed for wheezing or shortness of breath 8.5 g 3    alendronate (Fosamax) 70 mg tablet Take 1 tablet (70 mg total) by mouth every 7 days 4 tablet 5    AMILoride 5 mg tablet TAKE 1 TABLET BY MOUTH TWICE  DAILY 180 tablet 1    amLODIPine (NORVASC) 5 mg tablet Take 1 tablet (5 mg total) by mouth daily 90 tablet 1    ascorbic acid (VITAMIN C) 500 MG tablet Take 1 tablet (500 mg total) by mouth 2 (two) times a day 60 tablet 2    aspirin 81 mg chewable tablet Chew 1 tablet (81 mg total) 2 (two) times a day 60 tablet 0    budesonide-formoterol (Symbicort) 160-4.5 mcg/act inhaler Inhale 2 puffs 2 (two) times a day Rinse mouth after use. 10.2 g 11    carvedilol (COREG) 25 mg tablet TAKE 1 TABLET BY MOUTH TWICE  DAILY WITH MEALS 180 tablet 1    cholecalciferol (VITAMIN D3) 1,000 units tablet Take 5 tablets (5,000 Units total) by mouth daily 90 tablet 5    clonazePAM (KlonoPIN) 0.5 mg tablet Take 1 tablet (0.5 mg total) by mouth 3 (three) times a day 270 tablet 0    Cyanocobalamin (VITAMIN B 12 PO) Take 1,000 mcg by mouth in the morning      ferrous sulfate 324 (65 Fe) mg Take 1 tablet (324 mg total) by mouth 2 (two) times a day before meals 60 tablet 2    fluvoxaMINE (LUVOX) 100 mg tablet Fluvoxamine 100m  "tablet in the morning ; 2 tablets at night 270 tablet 0    lamoTRIgine (LaMICtal) 200 MG tablet Lamotrigine 200m tablet in the morning , 1 tablet at night 180 tablet 3    montelukast (SINGULAIR) 10 mg tablet Take 1 tablet (10 mg total) by mouth daily at bedtime 30 tablet 5    Multiple Vitamin (MULTI-VITAMIN) tablet Take 1 tablet by mouth daily 30 tablet 2    omeprazole (PriLOSEC) 40 MG capsule TAKE 1 CAPSULE BY MOUTH DAILY  BEFORE BREAKFAST 90 capsule 1    ondansetron (ZOFRAN) 4 mg tablet Take 1 tablet (4 mg total) by mouth every 8 (eight) hours as needed for nausea or vomiting 30 tablet 0    QUEtiapine (SEROquel) 100 mg tablet Take 1 tablet (100 mg total) by mouth daily at bedtime Take in addition to the Seroquel 400 mg to total 500 mg daily at bedtime 90 tablet 0    QUEtiapine (SEROquel) 300 mg tablet Take 1 tablet (300 mg total) daily 90 tablet 0    QUEtiapine (SEROquel) 400 MG tablet Take 1 tablet (400 mg total) by mouth daily at bedtime Take with the Seroquel 100 mg to total 500 mg daily at bedtime 90 tablet 0    rosuvastatin (CRESTOR) 20 MG tablet Take 1 tablet (20 mg total) by mouth every evening 90 tablet 3    traMADol (ULTRAM) 50 mg tablet Take 1 tablet (50 mg total) by mouth 2 (two) times a day as needed for moderate pain 60 tablet 0     No current facility-administered medications on file prior to visit.          Objective     /70 (BP Location: Right arm, Patient Position: Sitting, Cuff Size: Adult)   Pulse 90   Temp 97.8 °F (36.6 °C) (Temporal)   Ht 5' 4\" (1.626 m)   Wt 91.5 kg (201 lb 12.8 oz)   LMP  (LMP Unknown)   SpO2 98%   BMI 34.64 kg/m²     Physical Exam  Constitutional:       Appearance: Normal appearance.   Cardiovascular:      Rate and Rhythm: Normal rate and regular rhythm.   Pulmonary:      Breath sounds: Normal breath sounds.   Musculoskeletal:         General: No swelling or tenderness.   Skin:     General: Skin is warm and dry.   Neurological:      General: No focal " deficit present.      Mental Status: She is alert.           Dragon Dictation software was used to dictate this note. It may contain errors with dictating incorrect words/spelling. Please contact provider directly for any questions.

## 2024-10-10 NOTE — TELEPHONE ENCOUNTER
Patient followed up on her request if  Dr. Harp would be able/willing to administer her Prolia shots moving forward.     Please call and let patient know accordingly.    Thank you!!

## 2024-10-14 ENCOUNTER — TELEPHONE (OUTPATIENT)
Age: 64
End: 2024-10-14

## 2024-10-14 NOTE — TELEPHONE ENCOUNTER
Patient called in wanting to schedule for a Prolia injection. Please submit a prior auth for pt in order to begin the process. Pt was een 5 yrs ago with Endo. Pt states her Rheumatologist referred to get one.

## 2024-10-15 NOTE — TELEPHONE ENCOUNTER
Writer returned call and confirmed 8/14/24 appointment with Arabella Mallory at  and that Dr. Medina is no longer at [facility] and she will be receiving a call from intake to schedule with a new provider.    Client was asking about PT Therapy and dustinr specified that was a different department and could give the time she is scheduled for based on the list, but she would have to call them with any questions or concerns.   
4 = No assist / stand by assistance

## 2024-10-17 ENCOUNTER — OFFICE VISIT (OUTPATIENT)
Dept: PSYCHIATRY | Facility: CLINIC | Age: 64
End: 2024-10-17
Payer: MEDICARE

## 2024-10-17 DIAGNOSIS — F31.4 BIPOLAR DISORDER, CURRENT EPISODE DEPRESSED, SEVERE, WITHOUT PSYCHOTIC FEATURES (HCC): Primary | ICD-10-CM

## 2024-10-17 DIAGNOSIS — F41.9 ANXIETY DISORDER, UNSPECIFIED TYPE: ICD-10-CM

## 2024-10-17 DIAGNOSIS — F42.9 OBSESSIVE-COMPULSIVE DISORDER, UNSPECIFIED TYPE: ICD-10-CM

## 2024-10-17 DIAGNOSIS — F31.10 BIPOLAR I DISORDER, MOST RECENT EPISODE MANIC (HCC): ICD-10-CM

## 2024-10-17 PROBLEM — E87.6 HYPOKALEMIA: Status: ACTIVE | Noted: 2024-02-08

## 2024-10-17 PROBLEM — F31.2 BIPOLAR DISORDER, CURRENT EPISODE MANIC SEVERE WITH PSYCHOTIC FEATURES (HCC): Status: ACTIVE | Noted: 2024-02-08

## 2024-10-17 PROBLEM — E78.00 PURE HYPERCHOLESTEROLEMIA, UNSPECIFIED: Status: ACTIVE | Noted: 2017-01-17

## 2024-10-17 PROBLEM — F41.0 PANIC DISORDER (EPISODIC PAROXYSMAL ANXIETY): Status: ACTIVE | Noted: 2019-01-23

## 2024-10-17 PROBLEM — Z96.652 PRESENCE OF LEFT ARTIFICIAL KNEE JOINT: Status: ACTIVE | Noted: 2024-02-08

## 2024-10-17 PROBLEM — K21.9 GASTRO-ESOPHAGEAL REFLUX DISEASE WITHOUT ESOPHAGITIS: Status: ACTIVE | Noted: 2023-11-14

## 2024-10-17 PROBLEM — M51.16 INTERVERTEBRAL DISC DISORDERS WITH RADICULOPATHY, LUMBAR REGION: Status: ACTIVE | Noted: 2024-02-08

## 2024-10-17 PROBLEM — F39 UNSPECIFIED MOOD (AFFECTIVE) DISORDER (HCC): Status: ACTIVE | Noted: 2023-11-14

## 2024-10-17 PROBLEM — M25.142: Status: ACTIVE | Noted: 2020-01-08

## 2024-10-17 PROBLEM — N18.9 CHRONIC KIDNEY DISEASE, UNSPECIFIED: Status: ACTIVE | Noted: 2020-02-06

## 2024-10-17 PROBLEM — F32.9 MAJOR DEPRESSIVE DISORDER, SINGLE EPISODE, UNSPECIFIED: Status: ACTIVE | Noted: 2024-02-08

## 2024-10-17 PROBLEM — E87.0 HYPEROSMOLALITY AND HYPERNATREMIA: Status: ACTIVE | Noted: 2024-02-08

## 2024-10-17 PROBLEM — M54.50 LOW BACK PAIN, UNSPECIFIED: Status: ACTIVE | Noted: 2024-02-08

## 2024-10-17 PROCEDURE — 99212 OFFICE O/P EST SF 10 MIN: CPT

## 2024-10-17 RX ORDER — LAMOTRIGINE 200 MG/1
TABLET ORAL
Qty: 180 TABLET | Refills: 3 | Status: SHIPPED | OUTPATIENT
Start: 2024-10-17 | End: 2024-10-17 | Stop reason: SDUPTHER

## 2024-10-17 RX ORDER — LAMOTRIGINE 200 MG/1
TABLET ORAL
Qty: 180 TABLET | Refills: 3 | Status: SHIPPED | OUTPATIENT
Start: 2024-10-17

## 2024-10-22 ENCOUNTER — OFFICE VISIT (OUTPATIENT)
Dept: FAMILY MEDICINE CLINIC | Facility: HOSPITAL | Age: 64
End: 2024-10-22
Payer: MEDICARE

## 2024-10-22 ENCOUNTER — SOCIAL WORK (OUTPATIENT)
Dept: BEHAVIORAL/MENTAL HEALTH CLINIC | Facility: CLINIC | Age: 64
End: 2024-10-22
Payer: MEDICARE

## 2024-10-22 VITALS
OXYGEN SATURATION: 95 % | WEIGHT: 208 LBS | BODY MASS INDEX: 35.51 KG/M2 | HEART RATE: 75 BPM | DIASTOLIC BLOOD PRESSURE: 70 MMHG | SYSTOLIC BLOOD PRESSURE: 134 MMHG | HEIGHT: 64 IN

## 2024-10-22 DIAGNOSIS — E55.9 VITAMIN D DEFICIENCY, UNSPECIFIED: ICD-10-CM

## 2024-10-22 DIAGNOSIS — F42.2 MIXED OBSESSIONAL THOUGHTS AND ACTS: Primary | ICD-10-CM

## 2024-10-22 DIAGNOSIS — I35.0 MODERATE AORTIC STENOSIS: ICD-10-CM

## 2024-10-22 DIAGNOSIS — F31.2 BIPOLAR DISORDER, CURRENT EPISODE MANIC SEVERE WITH PSYCHOTIC FEATURES (HCC): ICD-10-CM

## 2024-10-22 DIAGNOSIS — M17.0 PRIMARY LOCALIZED OSTEOARTHRITIS OF KNEES, BILATERAL: ICD-10-CM

## 2024-10-22 DIAGNOSIS — M81.0 AGE-RELATED OSTEOPOROSIS WITHOUT CURRENT PATHOLOGICAL FRACTURE: ICD-10-CM

## 2024-10-22 DIAGNOSIS — E21.3 HYPERPARATHYROIDISM, UNSPECIFIED (HCC): ICD-10-CM

## 2024-10-22 DIAGNOSIS — E22.1 HYPERPROLACTINEMIA (HCC): ICD-10-CM

## 2024-10-22 DIAGNOSIS — N25.81 SECONDARY HYPERPARATHYROIDISM OF RENAL ORIGIN (HCC): ICD-10-CM

## 2024-10-22 DIAGNOSIS — E04.1 THYROID NODULE: ICD-10-CM

## 2024-10-22 DIAGNOSIS — F39 UNSPECIFIED MOOD (AFFECTIVE) DISORDER (HCC): ICD-10-CM

## 2024-10-22 DIAGNOSIS — I10 ESSENTIAL (PRIMARY) HYPERTENSION: ICD-10-CM

## 2024-10-22 DIAGNOSIS — Z23 ENCOUNTER FOR IMMUNIZATION: Primary | ICD-10-CM

## 2024-10-22 PROBLEM — M25.562 ACUTE PAIN OF LEFT KNEE: Status: RESOLVED | Noted: 2023-12-19 | Resolved: 2024-10-22

## 2024-10-22 PROCEDURE — 90673 RIV3 VACCINE NO PRESERV IM: CPT

## 2024-10-22 PROCEDURE — 90834 PSYTX W PT 45 MINUTES: CPT | Performed by: COUNSELOR

## 2024-10-22 PROCEDURE — G0008 ADMIN INFLUENZA VIRUS VAC: HCPCS

## 2024-10-22 PROCEDURE — 99214 OFFICE O/P EST MOD 30 MIN: CPT | Performed by: INTERNAL MEDICINE

## 2024-10-22 NOTE — PSYCH
"Behavioral Health Psychotherapy Progress Note    Psychotherapy Provided: Individual Psychotherapy     1. Mixed obsessional thoughts and acts        2. Unspecified mood (affective) disorder (HCC)            Goals addressed in session: Goal 1     DATA: Ayleen said she is now able to do the stairs where she is living with her sister and her niece.  She said she is happy to be able to go back upstairs to her room for privacy.  Ayleen said she is sleeping better in her bed and not being on the couch.  She talked about some of her issues with her  health but said she is doing okay.  Ayleen reported she got a newer car and said she hopes she has good luck with it.  She said she feels better being off her one psych medication that Dr. Medina had her on saying she feels more clear in her head.  Ayleen reported she is very happy seeing her new [facility] medication provider.    During this session, this clinician used the following therapeutic modalities: Supportive Psychotherapy    Substance Abuse was not addressed during this session. If the client is diagnosed with a co-occurring substance use disorder, please indicate any changes in the frequency or amount of use: N/A. Stage of change for addressing substance use diagnoses: No substance use/Not applicable    ASSESSMENT:  Ayleen Moralez presents with a Euthymic/ normal mood.     her affect is Normal range and intensity, which is congruent, with her mood and the content of the session. The client has made progress on their goals.    Ayleen Moralez presents with a none risk of suicide, none risk of self-harm, and none risk of harm to others.    For any risk assessment that surpasses a \"low\" rating, a safety plan must be developed.    A safety plan was indicated: no  If yes, describe in detail N/A    PLAN: Between sessions, Ayleen Moralez will practice good self care. At the next session, the therapist will use Supportive Psychotherapy to address " goals/needs/concerns.    Behavioral Health Treatment Plan and Discharge Planning: Ayleen Moralez is aware of and agrees to continue to work on their treatment plan. They have identified and are working toward their discharge goals. yes    Visit start and stop times:    10/22/24  Start Time: 1000  Stop Time: 1051  Total Visit Time: 51 minutes

## 2024-10-22 NOTE — ASSESSMENT & PLAN NOTE
Agree with use of Prolia as recommended by  rheumatology Abi Salguero- will refer to  endocrinology about setting up for injections

## 2024-10-22 NOTE — PROGRESS NOTES
Assessment/Plan:     Diagnosis ICD-10-CM Associated Orders   1. Encounter for immunization  Z23 influenza vaccine, recombinant, PF, 0.5 mL IM (Flublok)      2. Vitamin D deficiency, unspecified  E55.9       3. Age-related osteoporosis without current pathological fracture  M81.0 Ambulatory Referral to Endocrinology      4. Bipolar disorder, current episode manic severe with psychotic features (HCC)  F31.2       5. Hyperparathyroidism, unspecified (HCC)  E21.3       6. Thyroid nodule  E04.1 US thyroid      7. Secondary hyperparathyroidism of renal origin (HCC)  N25.81           Problem List Items Addressed This Visit          Endocrine    Hyperparathyroidism, unspecified (HCC)    Secondary hyperparathyroidism of renal origin (HCC)    Thyroid nodule    Relevant Orders    US thyroid       Musculoskeletal and Integument    Age-related osteoporosis without current pathological fracture     Agree with use of Prolia as recommended by  rheumatology Abi Salguero- will refer to  endocrinology about setting up for injections         Relevant Orders    Ambulatory Referral to Endocrinology       Behavioral Health    Bipolar disorder, current episode manic severe with psychotic features (HCC)       Other    Vitamin D deficiency, unspecified     Other Visit Diagnoses       Encounter for immunization    -  Primary    Relevant Orders    influenza vaccine, recombinant, PF, 0.5 mL IM (Flublok) (Completed)              No follow-ups on file.      Subjective:    Patient ID: Ayleen Moralez is a 64 y.o. female    Here for follow up-  1. Ongoing  bilateral knee and back pain-will try to wean tramadol   Osteoporosis- with ckd and issues with  osteoporosis and gerd-  harrison top fosamax        The following portions of the patient's history were reviewed and updated as appropriate: allergies, current medications and problem list.     Review of Systems   Constitutional:  Negative for fatigue.   HENT:  Negative for congestion.     Respiratory:  Negative for chest tightness and shortness of breath.    Cardiovascular:  Negative for chest pain and leg swelling.   Gastrointestinal:  Negative for abdominal pain.   All other systems reviewed and are negative.        Objective:      Current Outpatient Medications:     acetaminophen (TYLENOL) 500 mg tablet, Take 2 tablets (1,000 mg total) by mouth every 8 (eight) hours, Disp: 60 tablet, Rfl: 0    albuterol (Ventolin HFA) 90 mcg/act inhaler, Inhale 2 puffs every 6 (six) hours as needed for wheezing or shortness of breath, Disp: 8.5 g, Rfl: 3    AMILoride 5 mg tablet, TAKE 1 TABLET BY MOUTH TWICE  DAILY, Disp: 180 tablet, Rfl: 1    amLODIPine (NORVASC) 5 mg tablet, Take 1 tablet (5 mg total) by mouth daily, Disp: 90 tablet, Rfl: 1    ascorbic acid (VITAMIN C) 500 MG tablet, Take 1 tablet (500 mg total) by mouth 2 (two) times a day, Disp: 60 tablet, Rfl: 2    aspirin 81 mg chewable tablet, Chew 1 tablet (81 mg total) 2 (two) times a day, Disp: 60 tablet, Rfl: 0    budesonide-formoterol (Symbicort) 160-4.5 mcg/act inhaler, Inhale 2 puffs 2 (two) times a day Rinse mouth after use., Disp: 10.2 g, Rfl: 11    carvedilol (COREG) 25 mg tablet, TAKE 1 TABLET BY MOUTH TWICE  DAILY WITH MEALS, Disp: 180 tablet, Rfl: 1    cholecalciferol (VITAMIN D3) 1,000 units tablet, Take 5 tablets (5,000 Units total) by mouth daily, Disp: 90 tablet, Rfl: 5    clonazePAM (KlonoPIN) 0.5 mg tablet, Take 1 tablet (0.5 mg total) by mouth 3 (three) times a day, Disp: 270 tablet, Rfl: 0    Cyanocobalamin (VITAMIN B 12 PO), Take 1,000 mcg by mouth in the morning, Disp: , Rfl:     ferrous sulfate 324 (65 Fe) mg, Take 1 tablet (324 mg total) by mouth 2 (two) times a day before meals, Disp: 60 tablet, Rfl: 2    fluvoxaMINE (LUVOX) 100 mg tablet, Fluvoxamine 100m tablet in the morning ; 2 tablets at night, Disp: 270 tablet, Rfl: 0    lamoTRIgine (LaMICtal) 200 MG tablet, Lamotrigine 200m tablet in the morning , 1 tablet at  "night, Disp: 180 tablet, Rfl: 3    montelukast (SINGULAIR) 10 mg tablet, Take 1 tablet (10 mg total) by mouth daily at bedtime, Disp: 30 tablet, Rfl: 5    Multiple Vitamin (MULTI-VITAMIN) tablet, Take 1 tablet by mouth daily, Disp: 30 tablet, Rfl: 2    omeprazole (PriLOSEC) 40 MG capsule, TAKE 1 CAPSULE BY MOUTH DAILY  BEFORE BREAKFAST, Disp: 90 capsule, Rfl: 1    ondansetron (ZOFRAN) 4 mg tablet, Take 1 tablet (4 mg total) by mouth every 8 (eight) hours as needed for nausea or vomiting, Disp: 30 tablet, Rfl: 0    QUEtiapine (SEROquel) 100 mg tablet, Take 1 tablet (100 mg total) by mouth daily at bedtime Take in addition to the Seroquel 400 mg to total 500 mg daily at bedtime, Disp: 90 tablet, Rfl: 0    QUEtiapine (SEROquel) 300 mg tablet, Take 1 tablet (300 mg total) daily, Disp: 90 tablet, Rfl: 0    QUEtiapine (SEROquel) 400 MG tablet, Take 1 tablet (400 mg total) by mouth daily at bedtime Take with the Seroquel 100 mg to total 500 mg daily at bedtime, Disp: 90 tablet, Rfl: 0    rosuvastatin (CRESTOR) 20 MG tablet, Take 1 tablet (20 mg total) by mouth every evening, Disp: 90 tablet, Rfl: 3    traMADol (ULTRAM) 50 mg tablet, Take 1 tablet (50 mg total) by mouth 2 (two) times a day as needed for moderate pain, Disp: 60 tablet, Rfl: 0    Blood pressure 134/70, pulse 75, height 5' 4\" (1.626 m), weight 94.3 kg (208 lb), SpO2 95%, not currently breastfeeding.     Physical Exam  Vitals and nursing note reviewed.   Constitutional:       General: She is not in acute distress.  HENT:      Head: Normocephalic.      Right Ear: Tympanic membrane normal.      Left Ear: Tympanic membrane normal.      Nose: No congestion.      Mouth/Throat:      Pharynx: No oropharyngeal exudate.   Eyes:      General:         Right eye: No discharge.   Cardiovascular:      Rate and Rhythm: Normal rate and regular rhythm.      Heart sounds: Murmur heard.   Pulmonary:      Breath sounds: No rhonchi.   Abdominal:      Tenderness: There is no " guarding.   Musculoskeletal:         General: No tenderness.      Cervical back: No rigidity or tenderness.   Skin:     Findings: No bruising or erythema.   Neurological:      Mental Status: She is alert and oriented to person, place, and time. Mental status is at baseline.   Psychiatric:         Mood and Affect: Mood normal.         Thought Content: Thought content normal.

## 2024-10-26 ENCOUNTER — APPOINTMENT (EMERGENCY)
Dept: CT IMAGING | Facility: HOSPITAL | Age: 64
End: 2024-10-26
Payer: MEDICARE

## 2024-10-26 ENCOUNTER — APPOINTMENT (EMERGENCY)
Dept: RADIOLOGY | Facility: HOSPITAL | Age: 64
End: 2024-10-26
Payer: MEDICARE

## 2024-10-26 ENCOUNTER — HOSPITAL ENCOUNTER (EMERGENCY)
Facility: HOSPITAL | Age: 64
Discharge: HOME | End: 2024-10-26
Attending: EMERGENCY MEDICINE | Admitting: EMERGENCY MEDICINE
Payer: MEDICARE

## 2024-10-26 VITALS
OXYGEN SATURATION: 91 % | SYSTOLIC BLOOD PRESSURE: 124 MMHG | HEART RATE: 73 BPM | TEMPERATURE: 98.6 F | RESPIRATION RATE: 18 BRPM | DIASTOLIC BLOOD PRESSURE: 63 MMHG
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
## 2024-10-26 DIAGNOSIS — S80.01XA TRAUMATIC ECCHYMOSIS OF RIGHT KNEE, INITIAL ENCOUNTER: ICD-10-CM

## 2024-10-26 PROCEDURE — 99284 EMERGENCY DEPT VISIT MOD MDM: CPT | Performed by: EMERGENCY MEDICINE

## 2024-10-26 PROCEDURE — 72125 CT NECK SPINE W/O DYE: CPT

## 2024-10-26 PROCEDURE — 70450 CT HEAD/BRAIN W/O DYE: CPT

## 2024-10-26 PROCEDURE — 73564 X-RAY EXAM KNEE 4 OR MORE: CPT

## 2024-10-26 PROCEDURE — 99284 EMERGENCY DEPT VISIT MOD MDM: CPT

## 2024-10-26 NOTE — ED PROVIDER NOTES
Emergency Department Trauma Note  Ayleen Moralez 64 y.o. female MRN: [id number]  Unit/Bed#: ED 03/ED 03 Encounter: [id number]      Trauma Alert: Trauma Acuity: Trauma Evaluation  Model of Arrival: Mode of Arrival: Direct from scene via    Trauma Team: Current Providers  Attending Provider: Hawa Villeda DO  Registered Nurse: Dina Way, FRANCI  Consultants:     None      History of Present Illness     Chief Complaint:   Chief Complaint   Patient presents with    Fall     Pt to ED states she fell down stairs and having pain in R knee. +aspirin. Denies LOC. Hit head, face, knees.        HPI:  Ayleen Moralez is a 64 y.o. female who presents after a fall that occurred yesterday afternoon.  Patient reports that she was walking down carpeted steps at home and tripped resulting in falling forwards hitting her right knee and chin on the floor.  She denies loss of consciousness, but states that since falling, she has had a mild headache over the entire head that is throbbing in nature.  Also complains of right knee pain that is constant, worse with walking, somewhat improved with Tylenol.  Denies any neck pain, back pain, nausea, vomiting, changes in vision, numbness, tingling, loss of bowel or bladder control.  Mechanism:Details of Incident: pt states she fell down the stairs inside of her home, hit head,face, knees. +aspirin. no LOC Injury Date: 10/25/24 Injury Time: 1600 Injury Occurence Location - Specify County: [place]    HPI  Review of Systems   Eyes:  Negative for photophobia and visual disturbance.   Respiratory:  Negative for shortness of breath.    Cardiovascular:  Negative for chest pain.   Gastrointestinal:  Negative for abdominal pain, nausea and vomiting.   Musculoskeletal:  Negative for back pain, neck pain and neck stiffness.   Skin:  Negative for wound.   Neurological:  Positive for headaches. Negative for dizziness, weakness, light-headedness and numbness.   Psychiatric/Behavioral:  Negative for  confusion.        Historical Information     Immunizations:   Immunization History   Administered Date(s) Administered    COVID-19 PFIZER VACCINE 0.3 ML IM 05/05/2021, 05/29/2021, 01/21/2022    Influenza Recombinant Preservative Free Im 10/22/2024    Influenza, recombinant, quadrivalent,injectable, preservative free 01/02/2019, 10/02/2019, 01/03/2022, 10/10/2022    Pneumococcal Conjugate 13-Valent 08/14/2019    Pneumococcal Polysaccharide PPV23 01/31/2022    Tdap 08/14/2019       Past Medical History:   Diagnosis Date    Anxiety     Anxiety disorder     Arthritis     At risk for falls     Bipolar 2 disorder (HCC)     Chronic back pain     Chronic kidney disease     Closed fracture of distal end of right fibula with routine healing 11/04/2020    COVID-19     in Jan 2021    CVA (cerebral vascular accident) (HCC)     noted on MRI in the past    Depression     GERD (gastroesophageal reflux disease)     Hypercholesteremia     Hypernatremia     Hypertension     Hypokalemia     Idiopathic chronic pancreatitis (HCC) 03/20/2018    Intervertebral disc disorder with radiculopathy of lumbosacral region     resolved: 12/28/2015    Kidney disease     Kidney disease     Limb alert care status     LUE-fistula    Panic attacks     Pericardial effusion     PONV (postoperative nausea and vomiting)     Psychiatric problem     Radiculitis     resolved: 12/28/2015    Secondary renal hyperparathyroidism (HCC)     Stroke (HCC)     Vitamin D deficiency        Family History   Problem Relation Age of Onset    Bipolar disorder Mother     Mental illness Mother         depression    Stroke Mother     Dementia Mother     Colon polyps Mother     Heart disease Father     Hypertension Father     Diabetes Father     Other Family         Back disorder    Diabetes Family     Heart disease Family     Hypertension Family     Stroke Family     Thyroid disease Family     Breast cancer Paternal Grandmother         age unknown    Breast cancer Paternal Aunt          age unknown    Breast cancer Maternal Aunt         age unknown    Mental illness Sister     Colon polyps Sister     Mental illness Sister     Heart disease Sister     No Known Problems Sister     Breast cancer Sister 68    Other Son         pituitary tumor    Hypertension Son     Obesity Son     No Known Problems Son     No Known Problems Maternal Grandmother     No Known Problems Maternal Grandfather     No Known Problems Paternal Grandfather     Breast cancer Paternal Aunt         age unknown    Substance Abuse Neg Hx         neg fam hx    Colon cancer Neg Hx      Past Surgical History:   Procedure Laterality Date    BUNIONECTOMY      Left foot     CAST APPLICATION Right 2022    Procedure: Application short-arm thumb spica splint;  Surgeon: Lyndon Victoria MD;  [facility]: [facility];  Service: Orthopedics    COLON SURGERY      COLONOSCOPY  2021    DILATION AND CURETTAGE OF UTERUS      INDUCED       surgically induced    FL ARTERIOVENOUS ANASTOMOSIS OPEN DIRECT Left 2019    Procedure: CREATION FISTULA ARTERIOVENOUS (AV) left wrists possible left upper;  Surgeon: Andrey Quintero MD;  Location: [facility];  Service: Vascular    FL ARTHRP KNE CONDYLE&PLATU MEDIAL&LAT COMPARTMENTS Left 2024    Procedure: ARTHROPLASTY KNEE TOTAL SAME DAY;  Surgeon: Riki Ma MD;  Location: [facility];  Service: Orthopedics    FL CORR HLX [facility] DSTL METAR OSTEOT Left 2019    Procedure: Serge bunionectomy;  Surgeon: Munir Larkin DPM;  [facility];  Service: Podiatry    FL CORR HLX [facility] BNCTY [facility] DSTL METAR OSTEOT Right 8/3/2020    Procedure: BUNIONECTOMY RAFAEL;  Surgeon: Munir Larkin DPM;  [facility]: [facility];  Service: Podiatry    [facility] Right 2021    Procedure: BUNIONECTOMY TAMEKA, right tameka osteotomy and 2nd claw toe correction;  Surgeon: James R Lachman, MD;  Location: [facility];  Service: Orthopedics    FL ERCP  DX COLLECTION SPECIMEN BRUSHING/WASHING N/A 4/11/2018    Procedure: ENDOSCOPIC RETROGRADE CHOLANGIOPANCREATOGRAPHY (ERCP);  Surgeon: Alfredo Messina MD;  [facility];  Service: Gastroenterology    ND LAPAROSCOPY PROCTOPEXY PROLAPSE N/A 7/13/2018    Procedure: ROBOTIC SIGMOID RESECTION / RECTOPEXY;  Surgeon: ELPIDIO Mcnulty MD;  [facility];  Service: Colorectal    ND OPEN TREATMENT RADIAL SHAFT FRACTURE Right 10/11/2021    Procedure: OPEN REDUCTION W/ INTERNAL FIXATION (ORIF) RADIUS (WRIST), RIGHT DISTAL;  Surgeon: Riki Ma MD;  [facility]: [facility];  Service: Orthopedics    ND REMOVAL IMPLANT DEEP Right 6/23/2022    Procedure: Removal of hardware volar aspect right distal radius (distal radial plate and screws);  Surgeon: Lyndon Victoria MD;  Location: [facility];  Service: Orthopedics    ND SIGMOIDOSCOPY FLX DX W/COLLJ SPEC BR/WA IF PFRMD N/A 7/13/2018    Procedure: SIGMOIDOSCOPY FLEXIBLE;  Surgeon: ELPIDIO Mcnulty MD;  [facility];  Service: Colorectal    ND TR TDN RESTORE INTRNSC FUNCJ ALL 4 FNGRS Right 6/23/2022    Procedure: Right ring finger flexor digitorum superficialis to flexor pollicis longus tendon transfer;  Surgeon: Lyndon Victoria MD;  Location: [facility];  Service: Orthopedics    TUBAL LIGATION Bilateral 1997    US GUIDED THYROID BIOPSY  7/30/2019     Social History     Tobacco Use    Smoking status: Never    Smokeless tobacco: Never   Vaping Use    Vaping status: Never Used   Substance Use Topics    Alcohol use: Not Currently    Drug use: Not Currently     Types: Hydrocodone, Marijuana     Comment: JESSE Abbasi has h/o marijuana abuse- not currently     E-Cigarette/Vaping    E-Cigarette Use Never User      E-Cigarette/Vaping Substances    Nicotine No     THC No     CBD No     Flavoring No     Other No     Unknown No        Family History: non-contributory    Meds/Allergies   Prior to Admission Medications   Prescriptions Last Dose Informant Patient  Reported? Taking?   AMILoride 5 mg tablet   No No   Sig: TAKE 1 TABLET BY MOUTH TWICE  DAILY   Cyanocobalamin (VITAMIN B 12 PO)  Self Yes No   Sig: Take 1,000 mcg by mouth in the morning   Multiple Vitamin (MULTI-VITAMIN) tablet  Self No No   Sig: Take 1 tablet by mouth daily   QUEtiapine (SEROquel) 100 mg tablet   No No   Sig: Take 1 tablet (100 mg total) by mouth daily at bedtime Take in addition to the Seroquel 400 mg to total 500 mg daily at bedtime   QUEtiapine (SEROquel) 300 mg tablet   No No   Sig: Take 1 tablet (300 mg total) daily   QUEtiapine (SEROquel) 400 MG tablet   No No   Sig: Take 1 tablet (400 mg total) by mouth daily at bedtime Take with the Seroquel 100 mg to total 500 mg daily at bedtime   acetaminophen (TYLENOL) 500 mg tablet  Self No No   Sig: Take 2 tablets (1,000 mg total) by mouth every 8 (eight) hours   albuterol (Ventolin HFA) 90 mcg/act inhaler  Self No No   Sig: Inhale 2 puffs every 6 (six) hours as needed for wheezing or shortness of breath   amLODIPine (NORVASC) 5 mg tablet   No No   Sig: Take 1 tablet (5 mg total) by mouth daily   ascorbic acid (VITAMIN C) 500 MG tablet  Self No No   Sig: Take 1 tablet (500 mg total) by mouth 2 (two) times a day   aspirin 81 mg chewable tablet  Self No No   Sig: Chew 1 tablet (81 mg total) 2 (two) times a day   budesonide-formoterol (Symbicort) 160-4.5 mcg/act inhaler  Self No No   Sig: Inhale 2 puffs 2 (two) times a day Rinse mouth after use.   carvedilol (COREG) 25 mg tablet   No No   Sig: TAKE 1 TABLET BY MOUTH TWICE  DAILY WITH MEALS   cholecalciferol (VITAMIN D3) 1,000 units tablet  Self No No   Sig: Take 5 tablets (5,000 Units total) by mouth daily   clonazePAM (KlonoPIN) 0.5 mg tablet   No No   Sig: Take 1 tablet (0.5 mg total) by mouth 3 (three) times a day   ferrous sulfate 324 (65 Fe) mg  Self No No   Sig: Take 1 tablet (324 mg total) by mouth 2 (two) times a day before meals   fluvoxaMINE (LUVOX) 100 mg tablet   No No   Sig: Fluvoxamine  100m tablet in the morning ; 2 tablets at night   lamoTRIgine (LaMICtal) 200 MG tablet   No No   Sig: Lamotrigine 200m tablet in the morning , 1 tablet at night   montelukast (SINGULAIR) 10 mg tablet   No No   Sig: Take 1 tablet (10 mg total) by mouth daily at bedtime   omeprazole (PriLOSEC) 40 MG capsule   No No   Sig: TAKE 1 CAPSULE BY MOUTH DAILY  BEFORE BREAKFAST   ondansetron (ZOFRAN) 4 mg tablet   No No   Sig: Take 1 tablet (4 mg total) by mouth every 8 (eight) hours as needed for nausea or vomiting   rosuvastatin (CRESTOR) 20 MG tablet  Self No No   Sig: Take 1 tablet (20 mg total) by mouth every evening   traMADol (ULTRAM) 50 mg tablet   No No   Sig: Take 1 tablet (50 mg total) by mouth 2 (two) times a day as needed for moderate pain      Facility-Administered Medications: None       Allergies   Allergen Reactions    Molds & Smuts Nasal Congestion    Bee Pollen Nasal Congestion     Other reaction(s): Nasal Congestion    Pollen Extract Nasal Congestion       PHYSICAL EXAM    PE limited by: N/A    Objective   Vitals:   First set: Temperature: 98.6 °F (37 °C) (10/26/24 0746)  Pulse: 74 (10/26/24 0746)  Respirations: 18 (10/26/24 0746)  Blood Pressure: 129/64 (10/26/24 0746)  SpO2: 94 % (10/26/24 0746)    Primary Survey:   (A) Airway: intact  (B) Breathing: CTABL  (C) Circulation: Pulses:   normal  (D) Disabliity:  GCS Total:  15  (E) Expose:  Completed    Secondary Survey: (Click on Physical Exam tab above)  Physical Exam  Vitals and nursing note reviewed.   Constitutional:       General: She is not in acute distress.     Appearance: Normal appearance. She is not ill-appearing, toxic-appearing or diaphoretic.   HENT:      Head: Normocephalic and atraumatic.      Mouth/Throat:      Mouth: Mucous membranes are moist.   Eyes:      Extraocular Movements: Extraocular movements intact.      Conjunctiva/sclera: Conjunctivae normal.      Pupils: Pupils are equal, round, and reactive to light.   Cardiovascular:       Rate and Rhythm: Normal rate and regular rhythm.      Pulses: Normal pulses.      Heart sounds: Normal heart sounds. No murmur heard.  Pulmonary:      Effort: Pulmonary effort is normal. No respiratory distress.      Breath sounds: Normal breath sounds. No stridor. No wheezing, rhonchi or rales.   Chest:      Chest wall: No tenderness.   Abdominal:      General: Bowel sounds are normal. There is no distension.      Palpations: Abdomen is soft.      Tenderness: There is no abdominal tenderness. There is no guarding or rebound.   Musculoskeletal:      Cervical back: Neck supple.      Right hip: Normal.      Left hip: Normal.      Right upper leg: Normal.      Left upper leg: Normal.      Right knee: Ecchymosis present. No swelling, deformity, effusion, erythema, lacerations, bony tenderness or crepitus. Normal range of motion. Tenderness present. Normal alignment.      Left knee: Normal. No swelling, deformity, effusion, erythema, ecchymosis or lacerations. Normal range of motion. No tenderness.      Right lower leg: Normal. No edema.      Right ankle: Normal.      Left ankle: Normal.      Right foot: Normal.      Left foot: Normal.      Comments: No midline c-t-l-spine tenderness, step offs, deformities  Pelvis stable    Skin:     General: Skin is warm and dry.   Neurological:      General: No focal deficit present.      Mental Status: She is alert and oriented to person, place, and time. Mental status is at baseline.   Psychiatric:         Mood and Affect: Mood normal.         Behavior: Behavior normal.         Cervical spine cleared by clinical criteria? No (imaging required)      Invasive Devices       Line  Duration             Hemodialysis AV Fistula 11/18/19 Left  1803 days    Hemodialysis AV Fistula 11/10/23 Left Forearm 350 days                    Lab Results:   Results Reviewed       None                   Imaging Studies:   Direct to CT: Yes  TRAUMA - CT head wo contrast   Final Result by Jaime Romero,  MD (10/26 0818)      No acute intracranial abnormality.      The study was marked in EPIC for immediate notification.            Workstation performed: [id number]         TRAUMA - CT spine cervical wo contrast   Final Result by Jaime Romero MD (10/26 0821)      No cervical spine fracture or traumatic malalignment.                  Workstation performed: [id number]         XR knee 4+ vw right injury   Final Result by Jaime Romero MD (10/26 0823)      No acute osseous abnormality.      Probable joint effusion.         Computerized Assisted Algorithm (CAA) may have been used to analyze all applicable images.         Workstation performed: [id number]               Procedures  Procedures         ED Course  ED Course as of 10/26/24 0937   Sat Oct 26, 2024   0837 Reviewed findings of right knee contusion and effusion, rest, ice, elevation, follow-up with orthopedics and strict return precautions which patient verbalized understanding of.  Prescribed diclofenac gel           Medical Decision Making  Assessment and Plan:   Check ct head and cervical spine due to mechanism of injury + XR right knee to assess for fracture.     Amount and/or Complexity of Data Reviewed  Radiology: ordered.                Disposition  Priority One Transfer: No  Final diagnoses:   Fall, initial encounter   Traumatic ecchymosis of right knee, initial encounter     Time reflects when diagnosis was documented in both MDM as applicable and the Disposition within this note       Time User Action Codes Description Comment    10/26/2024  8:28 AM Hawa Villeda Add [W19.XXXA] Fall, initial encounter     10/26/2024  8:28 AM Hawa Villeda Add [S80.01XA] Traumatic ecchymosis of right knee, initial encounter           ED Disposition       ED Disposition   Discharge    Condition   Stable    Date/Time   Sat Oct 26, 2024  8:28 AM    Comment   Ayleen Moralez discharge to home/self care.                   Follow-up Information       Follow up With  Specialties Details Why Contact Info Additional Information    [facility] [place] [facility] Schedule an appointment as soon as possible for a visit in 1 week for re-evaluation [address]  [facility] [address]  [facility] [address] [phone] [facility] [place], [address] [place], [place], [address] [phone]    Bette Harp DO Internal Medicine Schedule an appointment as soon as possible for a visit in 3 days for re-evaluation [address] [facility] [address]  [facility] [address]  [phone]        [facility] Emergency Medicine Go to  As needed, If symptoms worsen, for re-evaluation [address] [facility] [address] [phone] [facility], [address] [place], [place] [address]          Discharge Medication List as of 10/26/2024  8:29 AM        START taking these medications    Details   Diclofenac Sodium (VOLTAREN) 1 % Apply 2 g topically 4 (four) times a day for 7 days, Starting Sat 10/26/2024, Until Sat 11/2/2024, Normal           CONTINUE these medications which have NOT CHANGED    Details   acetaminophen (TYLENOL) 500 mg tablet Take 2 tablets (1,000 mg total) by mouth every 8 (eight) hours, Starting Mon 2/5/2024, Normal      albuterol (Ventolin HFA) 90 mcg/act inhaler Inhale 2 puffs every 6 (six) hours as needed for wheezing or shortness of breath, Starting Thu 4/27/2023, Normal      AMILoride 5 mg tablet TAKE 1 TABLET BY MOUTH TWICE  DAILY, Starting Wed 9/18/2024, Normal      amLODIPine (NORVASC) 5 mg tablet Take 1 tablet (5 mg total) by mouth daily, Starting Fri 9/6/2024, Normal      ascorbic acid (VITAMIN C) 500 MG tablet Take 1 tablet (500 mg total) by mouth 2 (two) times a day, Starting Thu 1/18/2024, Normal      aspirin 81 mg chewable tablet Chew 1 tablet (81 mg total) 2 (two) times a day,  Starting 2024, Normal      budesonide-formoterol (Symbicort) 160-4.5 mcg/act inhaler Inhale 2 puffs 2 (two) times a day Rinse mouth after use., Starting Thu 8/10/2023, Normal      carvedilol (COREG) 25 mg tablet TAKE 1 TABLET BY MOUTH TWICE  DAILY WITH MEALS, Starting Mon 10/7/2024, Normal      cholecalciferol (VITAMIN D3) 1,000 units tablet Take 5 tablets (5,000 Units total) by mouth daily, Starting 2022, Normal      clonazePAM (KlonoPIN) 0.5 mg tablet Take 1 tablet (0.5 mg total) by mouth 3 (three) times a day, Starting 2024, Normal      Cyanocobalamin (VITAMIN B 12 PO) Take 1,000 mcg by mouth in the morning, Historical Med      ferrous sulfate 324 (65 Fe) mg Take 1 tablet (324 mg total) by mouth 2 (two) times a day before meals, Starting 2024, Normal      fluvoxaMINE (LUVOX) 100 mg tablet Fluvoxamine 100m tablet in the morning ; 2 tablets at night, Normal      lamoTRIgine (LaMICtal) 200 MG tablet Lamotrigine 200m tablet in the morning , 1 tablet at night, Normal      montelukast (SINGULAIR) 10 mg tablet Take 1 tablet (10 mg total) by mouth daily at bedtime, Starting 2024, Normal      Multiple Vitamin (MULTI-VITAMIN) tablet Take 1 tablet by mouth daily, Starting 2024, Normal      omeprazole (PriLOSEC) 40 MG capsule TAKE 1 CAPSULE BY MOUTH DAILY  BEFORE BREAKFAST, Starting 2024, Normal      ondansetron (ZOFRAN) 4 mg tablet Take 1 tablet (4 mg total) by mouth every 8 (eight) hours as needed for nausea or vomiting, Starting 2024, Normal      !! QUEtiapine (SEROquel) 100 mg tablet Take 1 tablet (100 mg total) by mouth daily at bedtime Take in addition to the Seroquel 400 mg to total 500 mg daily at bedtime, Starting 2024, Normal      !! QUEtiapine (SEROquel) 300 mg tablet Take 1 tablet (300 mg total) daily, Normal      !! QUEtiapine (SEROquel) 400 MG tablet Take 1 tablet (400 mg total) by mouth daily at bedtime Take with the  Seroquel 100 mg to total 500 mg daily at bedtime, Starting Tue 9/24/2024, Normal      rosuvastatin (CRESTOR) 20 MG tablet Take 1 tablet (20 mg total) by mouth every evening, Starting Tue 1/30/2024, Normal      traMADol (ULTRAM) 50 mg tablet Take 1 tablet (50 mg total) by mouth 2 (two) times a day as needed for moderate pain, Starting Fri 10/4/2024, Normal       !! - Potential duplicate medications found. Please discuss with provider.            PDMP Review         Value Time User    PDMP Reviewed  Yes 10/17/2024 11:07 AM EMERITA Lynn            ED Provider  Electronically Signed by           Hawa Villeda DO  10/26/24 09

## 2024-10-28 ENCOUNTER — VBI (OUTPATIENT)
Dept: FAMILY MEDICINE CLINIC | Facility: HOSPITAL | Age: 64
End: 2024-10-28

## 2024-10-28 DIAGNOSIS — Z91.89 HIGH RISK FOR READMISSION: Primary | ICD-10-CM

## 2024-10-28 NOTE — TELEPHONE ENCOUNTER
10/28/24 10:57 AM    Patient contacted post ED visit, outreach attempt made but message could not be left. Additional outreach attempt will be made.     Thank you.  Juve Sierra MA  [facility]

## 2024-10-29 ENCOUNTER — OFFICE VISIT (OUTPATIENT)
Dept: FAMILY MEDICINE CLINIC | Facility: HOSPITAL | Age: 64
End: 2024-10-29
Payer: MEDICARE

## 2024-10-29 ENCOUNTER — PATIENT OUTREACH (OUTPATIENT)
Dept: CASE MANAGEMENT | Facility: OTHER | Age: 64
End: 2024-10-29

## 2024-10-29 VITALS
BODY MASS INDEX: 34.83 KG/M2 | SYSTOLIC BLOOD PRESSURE: 128 MMHG | DIASTOLIC BLOOD PRESSURE: 70 MMHG | HEART RATE: 78 BPM | HEIGHT: 64 IN | OXYGEN SATURATION: 97 % | WEIGHT: 204 LBS

## 2024-10-29 DIAGNOSIS — S80.01XA CONTUSION OF RIGHT KNEE, INITIAL ENCOUNTER: ICD-10-CM

## 2024-10-29 DIAGNOSIS — S00.83XD CONTUSION OF FACE, SUBSEQUENT ENCOUNTER: ICD-10-CM

## 2024-10-29 DIAGNOSIS — S20.01XA CONTUSION OF RIGHT BREAST, INITIAL ENCOUNTER: ICD-10-CM

## 2024-10-29 DIAGNOSIS — F31.4 BIPOLAR DISORDER, CURRENT EPISODE DEPRESSED, SEVERE, WITHOUT PSYCHOTIC FEATURES (HCC): ICD-10-CM

## 2024-10-29 DIAGNOSIS — Z12.31 ENCOUNTER FOR SCREENING MAMMOGRAM FOR MALIGNANT NEOPLASM OF BREAST: ICD-10-CM

## 2024-10-29 DIAGNOSIS — Z09 ENCOUNTER FOR EXAMINATION FOLLOWING TREATMENT AT HOSPITAL: Primary | ICD-10-CM
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
## 2024-10-29 PROBLEM — F11.20 OPIOID DEPENDENCE, UNCOMPLICATED (HCC): Status: RESOLVED | Noted: 2022-08-17 | Resolved: 2024-10-29

## 2024-10-29 PROCEDURE — G2211 COMPLEX E/M VISIT ADD ON: HCPCS | Performed by: INTERNAL MEDICINE

## 2024-10-29 PROCEDURE — 99214 OFFICE O/P EST MOD 30 MIN: CPT | Performed by: INTERNAL MEDICINE

## 2024-10-29 NOTE — ASSESSMENT & PLAN NOTE
Has December appointment upcoming with psychiatry- no recent med changes-    Would like them to consider decreasing meds - gets 11 hours of sleep

## 2024-10-29 NOTE — PROGRESS NOTES
Outpatient Care Management Note:    New value based team referral received. Patient was in ER in 10/26/24 after a fall down the stairs the day prior. She had pain in her right knee, hit her head, face, and knees. She denied any loss of consciousness but complained of mild headache. She is to follow up with PCP (10/29) and orthopedics (10/30).  Her PCP appt is at 9:20 am. CM will attempt to outreach later in the day.

## 2024-10-29 NOTE — PROGRESS NOTES
Assessment/Plan:     Diagnosis ICD-10-CM Associated Orders   1. Encounter for examination following treatment at hospital  Z09       2. Fall (on) (from) other stairs and steps, subsequent encounter  W10.8XXD       3. Contusion of face, subsequent encounter  S00.83XD       4. Contusion of right breast, initial encounter  S20.01XA       5. Contusion of right knee, initial encounter  S80.01XA       6. Encounter for screening mammogram for malignant neoplasm of breast  Z12.31       7. Bipolar disorder, current episode depressed, severe, without psychotic features (HCC)  F31.4           Problem List Items Addressed This Visit          Behavioral Health    Bipolar disorder, current episode depressed, severe, without psychotic features (HCC)     Has December appointment upcoming with psychiatry- no recent med changes-    Would like them to consider decreasing meds - gets 11 hours of sleep            Other Visit Diagnoses       Encounter for examination following treatment at hospital    -  Primary    Fall (on) (from) other stairs and steps, subsequent encounter        Contusion of face, subsequent encounter        Contusion of right breast, initial encounter        Contusion of right knee, initial encounter        Encounter for screening mammogram for malignant neoplasm of breast                  No follow-ups on file.      Subjective:    Patient ID: Ayleen Moralez is a 64 y.o. female    Had fall and taken to ED- fell down last 2 steps in slippers . Seen 10/26/24 - with bi alteral knee pain - no loc   Hit right side of chin with bruising and both knees and right lateral chest- still sore   Has appt  with /dr. Ma tomorrow- will order xray of left knee- they had done right knee in ed-    Will refer to pt for balance etc.- has been shuffling feet at times   Now in bed on ground floor- now has bed in family room- was doing 20 steps up to prior bedroom        The following portions of the patient's history were reviewed  and updated as appropriate: allergies, current medications and problem list.     Review of Systems   Constitutional:  Negative for chills and fatigue.   HENT:  Negative for congestion.    Cardiovascular:  Negative for chest pain and palpitations.   Musculoskeletal:  Positive for back pain.        Chronic back pain - no worsening since recent fall   Neurological:  Negative for dizziness, weakness and headaches.   Psychiatric/Behavioral:  Negative for confusion and decreased concentration.    All other systems reviewed and are negative.        Objective:      Current Outpatient Medications:     acetaminophen (TYLENOL) 500 mg tablet, Take 2 tablets (1,000 mg total) by mouth every 8 (eight) hours, Disp: 60 tablet, Rfl: 0    albuterol (Ventolin HFA) 90 mcg/act inhaler, Inhale 2 puffs every 6 (six) hours as needed for wheezing or shortness of breath, Disp: 8.5 g, Rfl: 3    AMILoride 5 mg tablet, TAKE 1 TABLET BY MOUTH TWICE  DAILY, Disp: 180 tablet, Rfl: 1    amLODIPine (NORVASC) 5 mg tablet, Take 1 tablet (5 mg total) by mouth daily, Disp: 90 tablet, Rfl: 1    ascorbic acid (VITAMIN C) 500 MG tablet, Take 1 tablet (500 mg total) by mouth 2 (two) times a day, Disp: 60 tablet, Rfl: 2    aspirin 81 mg chewable tablet, Chew 1 tablet (81 mg total) 2 (two) times a day, Disp: 60 tablet, Rfl: 0    budesonide-formoterol (Symbicort) 160-4.5 mcg/act inhaler, Inhale 2 puffs 2 (two) times a day Rinse mouth after use., Disp: 10.2 g, Rfl: 11    carvedilol (COREG) 25 mg tablet, TAKE 1 TABLET BY MOUTH TWICE  DAILY WITH MEALS, Disp: 180 tablet, Rfl: 1    cholecalciferol (VITAMIN D3) 1,000 units tablet, Take 5 tablets (5,000 Units total) by mouth daily, Disp: 90 tablet, Rfl: 5    clonazePAM (KlonoPIN) 0.5 mg tablet, Take 1 tablet (0.5 mg total) by mouth 3 (three) times a day, Disp: 270 tablet, Rfl: 0    Cyanocobalamin (VITAMIN B 12 PO), Take 1,000 mcg by mouth in the morning, Disp: , Rfl:     Diclofenac Sodium (VOLTAREN) 1 %, Apply 2 g  "topically 4 (four) times a day for 7 days, Disp: 56 g, Rfl: 0    ferrous sulfate 324 (65 Fe) mg, Take 1 tablet (324 mg total) by mouth 2 (two) times a day before meals, Disp: 60 tablet, Rfl: 2    fluvoxaMINE (LUVOX) 100 mg tablet, Fluvoxamine 100m tablet in the morning ; 2 tablets at night, Disp: 270 tablet, Rfl: 0    lamoTRIgine (LaMICtal) 200 MG tablet, Lamotrigine 200m tablet in the morning , 1 tablet at night, Disp: 180 tablet, Rfl: 3    montelukast (SINGULAIR) 10 mg tablet, Take 1 tablet (10 mg total) by mouth daily at bedtime, Disp: 30 tablet, Rfl: 5    Multiple Vitamin (MULTI-VITAMIN) tablet, Take 1 tablet by mouth daily, Disp: 30 tablet, Rfl: 2    omeprazole (PriLOSEC) 40 MG capsule, TAKE 1 CAPSULE BY MOUTH DAILY  BEFORE BREAKFAST, Disp: 90 capsule, Rfl: 1    ondansetron (ZOFRAN) 4 mg tablet, Take 1 tablet (4 mg total) by mouth every 8 (eight) hours as needed for nausea or vomiting, Disp: 30 tablet, Rfl: 0    QUEtiapine (SEROquel) 100 mg tablet, Take 1 tablet (100 mg total) by mouth daily at bedtime Take in addition to the Seroquel 400 mg to total 500 mg daily at bedtime, Disp: 90 tablet, Rfl: 0    QUEtiapine (SEROquel) 300 mg tablet, Take 1 tablet (300 mg total) daily, Disp: 90 tablet, Rfl: 0    QUEtiapine (SEROquel) 400 MG tablet, Take 1 tablet (400 mg total) by mouth daily at bedtime Take with the Seroquel 100 mg to total 500 mg daily at bedtime, Disp: 90 tablet, Rfl: 0    rosuvastatin (CRESTOR) 20 MG tablet, Take 1 tablet (20 mg total) by mouth every evening, Disp: 90 tablet, Rfl: 3    traMADol (ULTRAM) 50 mg tablet, Take 1 tablet (50 mg total) by mouth 2 (two) times a day as needed for moderate pain, Disp: 60 tablet, Rfl: 0    Blood pressure 128/70, pulse 78, height 5' 4\" (1.626 m), weight 92.5 kg (204 lb), SpO2 97%, not currently breastfeeding.     Physical Exam  Vitals and nursing note reviewed.   Constitutional:       General: She is not in acute distress.     Appearance: She is not " ill-appearing.   HENT:      Head:      Jaw: No tenderness or swelling.        Comments: Bruise on right lower lip area and rigth mandibular region     Right Ear: There is no impacted cerumen.      Left Ear: There is no impacted cerumen.      Nose: No congestion.      Mouth/Throat:      Pharynx: No oropharyngeal exudate.   Eyes:      General:         Right eye: No discharge.         Left eye: No discharge.   Cardiovascular:      Rate and Rhythm: Normal rate and regular rhythm.      Heart sounds: Murmur heard.      Comments: Grade 2/6 systolic murmur  Pulmonary:      Breath sounds: No wheezing or rhonchi.   Abdominal:      Palpations: Abdomen is soft.      Tenderness: There is no abdominal tenderness. There is no guarding.   Musculoskeletal:      Right lower leg: No edema.      Left lower leg: No edema.   Skin:     Findings: No lesion or rash.   Neurological:      Mental Status: She is alert. She is disoriented.   Psychiatric:         Mood and Affect: Mood normal.         Thought Content: Thought content normal.         Judgment: Judgment normal.

## 2024-10-30 ENCOUNTER — OFFICE VISIT (OUTPATIENT)
Dept: OBGYN CLINIC | Facility: CLINIC | Age: 64
End: 2024-10-30
Payer: MEDICARE

## 2024-10-30 ENCOUNTER — PATIENT OUTREACH (OUTPATIENT)
Dept: CASE MANAGEMENT | Facility: OTHER | Age: 64
End: 2024-10-30

## 2024-10-30 ENCOUNTER — HOSPITAL ENCOUNTER (OUTPATIENT)
Dept: RADIOLOGY | Facility: HOSPITAL | Age: 64
Discharge: HOME | End: 2024-10-30
Payer: MEDICARE

## 2024-10-30 ENCOUNTER — APPOINTMENT (OUTPATIENT)
Dept: LAB | Facility: HOSPITAL | Age: 64
End: 2024-10-30
Payer: MEDICARE

## 2024-10-30 ENCOUNTER — APPOINTMENT (OUTPATIENT)
Dept: RADIOLOGY | Facility: CLINIC | Age: 64
End: 2024-10-30
Payer: MEDICARE

## 2024-10-30 VITALS
BODY MASS INDEX: 35 KG/M2 | HEIGHT: 64 IN | DIASTOLIC BLOOD PRESSURE: 72 MMHG | WEIGHT: 205 LBS | SYSTOLIC BLOOD PRESSURE: 130 MMHG

## 2024-10-30 DIAGNOSIS — Z96.652 HISTORY OF ARTHROPLASTY OF LEFT KNEE: ICD-10-CM

## 2024-10-30 DIAGNOSIS — S80.02XA CONTUSION OF LEFT KNEE, INITIAL ENCOUNTER: ICD-10-CM

## 2024-10-30 DIAGNOSIS — E55.9 VITAMIN D DEFICIENCY: ICD-10-CM
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
## 2024-10-30 DIAGNOSIS — S80.01XA CONTUSION OF RIGHT KNEE, INITIAL ENCOUNTER: Primary | ICD-10-CM

## 2024-10-30 DIAGNOSIS — M25.562 ACUTE PAIN OF LEFT KNEE: ICD-10-CM

## 2024-10-30 DIAGNOSIS — M17.11 PRIMARY OSTEOARTHRITIS OF RIGHT KNEE: ICD-10-CM

## 2024-10-30 DIAGNOSIS — S80.01XA TRAUMATIC ECCHYMOSIS OF RIGHT KNEE, INITIAL ENCOUNTER: ICD-10-CM

## 2024-10-30 DIAGNOSIS — M81.0 AGE-RELATED OSTEOPOROSIS WITHOUT CURRENT PATHOLOGICAL FRACTURE: ICD-10-CM

## 2024-10-30 PROBLEM — Z47.1 AFTERCARE FOLLOWING LEFT KNEE JOINT REPLACEMENT SURGERY: Status: RESOLVED | Noted: 2024-02-15 | Resolved: 2024-10-30

## 2024-10-30 LAB
25(OH)D3 SERPL-MCNC: 69.9 NG/ML (ref 30–100)
GLIADIN PEPTIDE IGA SER-ACNC: 0.8 U/ML
GLIADIN PEPTIDE IGA SER-ACNC: NEGATIVE
GLIADIN PEPTIDE IGG SER-ACNC: <0.4 U/ML
GLIADIN PEPTIDE IGG SER-ACNC: NEGATIVE
IGA SERPL-MCNC: 77 MG/DL (ref 66–433)
PTH-INTACT SERPL-MCNC: 111.8 PG/ML (ref 12–88)
TSH SERPL DL<=0.05 MIU/L-ACNC: 2.79 UIU/ML (ref 0.45–4.5)
TTG IGA SER-ACNC: <0.5 U/ML
TTG IGA SER-ACNC: NEGATIVE
TTG IGG SER-ACNC: <0.8 U/ML
TTG IGG SER-ACNC: NEGATIVE

## 2024-10-30 PROCEDURE — 82784 ASSAY IGA/IGD/IGG/IGM EACH: CPT

## 2024-10-30 PROCEDURE — 84443 ASSAY THYROID STIM HORMONE: CPT

## 2024-10-30 PROCEDURE — 84166 PROTEIN E-PHORESIS/URINE/CSF: CPT

## 2024-10-30 PROCEDURE — 36415 COLL VENOUS BLD VENIPUNCTURE: CPT

## 2024-10-30 PROCEDURE — 83970 ASSAY OF PARATHORMONE: CPT

## 2024-10-30 PROCEDURE — 73560 X-RAY EXAM OF KNEE 1 OR 2: CPT

## 2024-10-30 PROCEDURE — 86258 DGP ANTIBODY EACH IG CLASS: CPT

## 2024-10-30 PROCEDURE — 86335 IMMUNFIX E-PHORSIS/URINE/CSF: CPT

## 2024-10-30 PROCEDURE — 73564 X-RAY EXAM KNEE 4 OR MORE: CPT

## 2024-10-30 PROCEDURE — 84165 PROTEIN E-PHORESIS SERUM: CPT

## 2024-10-30 PROCEDURE — 86334 IMMUNOFIX E-PHORESIS SERUM: CPT

## 2024-10-30 PROCEDURE — 82306 VITAMIN D 25 HYDROXY: CPT

## 2024-10-30 PROCEDURE — 86364 TISS TRNSGLTMNASE EA IG CLAS: CPT

## 2024-10-30 PROCEDURE — 84080 ASSAY ALKALINE PHOSPHATASES: CPT

## 2024-10-30 PROCEDURE — 99213 OFFICE O/P EST LOW 20 MIN: CPT | Performed by: ORTHOPAEDIC SURGERY

## 2024-10-30 NOTE — PROGRESS NOTES
Assessment:     1. Contusion of right knee, initial encounter    2. History of arthroplasty of left knee    3. Contusion of left knee, initial encounter        Plan:     Problem List Items Addressed This Visit          Surgery/Wound/Pain    Contusion of right knee - Primary    Relevant Orders    XR knee 1 or 2 vw right    Contusion of left knee    History of arthroplasty of left knee    Relevant Orders    XR knee 1 or 2 vw left    Findings consistent with bilateral knee contusions, date of injury October 26, 2024.  Findings and treatment options were discussed with the patient.  X-rays were reviewed with her that revealed no acute fractures.  Total knee prosthesis on left is intact with no evidence of loosening.  Discussed that the swelling and bruising will take a few weeks to resolve.  She is to continue icing and take Tylenol as needed for pain.  She is to avoid any kneeling, squatting or repetitive stair climbing.  She will follow-up as scheduled for her left total knee prosthesis follow-up.  All questions were answered to patient satisfaction.    Subjective:     Patient ID: Ayleen Moralez is a 64 y.o. female.  Chief Complaint:  This is a 63-year-old white female who is here for evaluation of her bilateral knees.  Patient referred here by Dr. Villeda.  She experienced a fall down the stairs in her home on October 26, 2024.  She states both of her knees hit the ground at the bottom of the stairs.  She had immediate pain.  She was seen in the emergency room and x-rays were taking of the right knee that revealed no acute fracture.  She had x-rays of her left knee this morning that revealed no acute fractures as well.  She has a history of a left total knee arthroplasty on February 5, 2024.  She arrives ambulating with no assistance.  She does feel at this point the pain is improving.  She does not feel much pain when ambulating.  She states the pain is worse in the right knee with stair climbing.  She  states she is now living on the ground floor of her home and avoiding the stairs.    Allergy:  Allergies   Allergen Reactions    Molds & Smuts Nasal Congestion    Bee Pollen Nasal Congestion     Other reaction(s): Nasal Congestion    Pollen Extract Nasal Congestion     Medications:  all current active meds have been reviewed  Past Medical History:  Past Medical History:   Diagnosis Date    Aftercare following left knee joint replacement surgery 02/15/2024    Anxiety     Anxiety disorder     Arthritis     At risk for falls     Bipolar 2 disorder (HCC)     Chronic back pain     Chronic kidney disease     Closed fracture of distal end of right fibula with routine healing 2020    COVID-19     in 2021    CVA (cerebral vascular accident) ([facility])     noted on MRI in the past    Depression     GERD (gastroesophageal reflux disease)     Hypercholesteremia     Hypernatremia     Hypertension     Hypokalemia     Idiopathic chronic pancreatitis ([facility]) 2018    Intervertebral disc disorder with radiculopathy of lumbosacral region     resolved: 2015    Kidney disease     Kidney disease     Limb alert care status     LUE-fistula    Panic attacks     Pericardial effusion     PONV (postoperative nausea and vomiting)     Psychiatric problem     Radiculitis     resolved: 2015    Secondary renal hyperparathyroidism (HCC)     Stroke ([facility])     Vitamin D deficiency      Past Surgical History:  Past Surgical History:   Procedure Laterality Date    BUNIONECTOMY      Left foot     CAST APPLICATION Right 2022    Procedure: Application short-arm thumb spica splint;  Surgeon: Lyndon Victoria MD;  Location:  MAIN OR;  Service: Orthopedics    COLON SURGERY      COLONOSCOPY  2021    DILATION AND CURETTAGE OF UTERUS      INDUCED       surgically induced    ID ARTERIOVENOUS ANASTOMOSIS OPEN DIRECT Left 2019    Procedure: CREATION FISTULA ARTERIOVENOUS (AV) left wrists possible left upper;  Surgeon:  Andrey Quintero MD;  Location: [facility];  Service: Vascular    PA ARTHRP KNE CONDYLE&PLATU MEDIAL&LAT COMPARTMENTS Left 2/5/2024    Procedure: ARTHROPLASTY KNEE TOTAL SAME DAY;  Surgeon: Riki Ma MD;  [facility];  Service: Orthopedics    [facility] [place] 7/1/2019    Procedure: Serge bunionectomy;  Surgeon: Munir Larkin DPM;  [facility];  Service: Podiatry    [place] [facility] [facility] [facility] [facility] [place] [facility] [place] Right 8/3/2020    Procedure: BUNIONECTOMY RAFAEL;  Surgeon: Munir Larkin DPM;  [facility];  Service: Podiatry    PA CORRJ HLX VLGS BNCTY SESMDC PROX PHLX OSTEOT Right 9/27/2021    Procedure: BUNIONECTOMY TAMEKA, right tameka osteotomy and 2nd claw toe correction;  Surgeon: James R Lachman, MD;  [facility]: [facility];  Service: Orthopedics    PA ERCP DX COLLECTION SPECIMEN BRUSHING/WASHING N/A 4/11/2018    Procedure: ENDOSCOPIC RETROGRADE CHOLANGIOPANCREATOGRAPHY (ERCP);  Surgeon: Alfredo Messina MD;  Location: [facility];  Service: Gastroenterology    PA LAPAROSCOPY PROCTOPEXY PROLAPSE N/A 7/13/2018    Procedure: ROBOTIC SIGMOID RESECTION / RECTOPEXY;  Surgeon: ELPIDIO Mcnulty MD;  Location: [facility];  Service: Colorectal    PA OPEN TREATMENT RADIAL SHAFT FRACTURE Right 10/11/2021    Procedure: OPEN REDUCTION W/ INTERNAL FIXATION (ORIF) RADIUS (WRIST), RIGHT DISTAL;  Surgeon: Riki Ma MD;  [facility];  Service: Orthopedics    PA REMOVAL IMPLANT DEEP Right 6/23/2022    Procedure: Removal of hardware volar aspect right distal radius (distal radial plate and screws);  Surgeon: Lyndon Victoria MD;  [facility]: [facility];  Service: Orthopedics    PA SIGMOIDOSCOPY FLX DX W/COLLJ SPEC BR/WA IF PFRMD N/A 7/13/2018    Procedure: SIGMOIDOSCOPY FLEXIBLE;  Surgeon: ELPIDIO Mcnulty MD;  [facility]: [facility];  Service: Colorectal    PA TR TDN RESTORE INTRNSC FUNCJ ALL 4 FNGRS Right 6/23/2022    Procedure: Right ring finger flexor  digitorum superficialis to flexor pollicis longus tendon transfer;  Surgeon: Lyndon Victoria MD;  Location:  MAIN OR;  Service: Orthopedics    TUBAL LIGATION Bilateral 1997    US GUIDED THYROID BIOPSY  7/30/2019     Family History:  Family History   Problem Relation Age of Onset    Bipolar disorder Mother     Mental illness Mother         depression    Stroke Mother     Dementia Mother     Colon polyps Mother     Heart disease Father     Hypertension Father     Diabetes Father     Other Family         Back disorder    Diabetes Family     Heart disease Family     Hypertension Family     Stroke Family     Thyroid disease Family     Breast cancer Paternal Grandmother         age unknown    Breast cancer Paternal Aunt         age unknown    Breast cancer Maternal Aunt         age unknown    Mental illness Sister     Colon polyps Sister     Mental illness Sister     Heart disease Sister     No Known Problems Sister     Breast cancer Sister 68    Other Son         pituitary tumor    Hypertension Son     Obesity Son     No Known Problems Son     No Known Problems Maternal Grandmother     No Known Problems Maternal Grandfather     No Known Problems Paternal Grandfather     Breast cancer Paternal Aunt         age unknown    Substance Abuse Neg Hx         neg fam hx    Colon cancer Neg Hx      Social History:  Social History     Substance and Sexual Activity   Alcohol Use Not Currently     Social History     Substance and Sexual Activity   Drug Use Not Currently    Types: Hydrocodone, Marijuana    Comment: JESSE Abbasi has h/o marijuana abuse- not currently     Social History     Tobacco Use   Smoking Status Never   Smokeless Tobacco Never     Review of Systems   Constitutional: Negative.    HENT: Negative.     Eyes: Negative.    Respiratory: Negative.     Cardiovascular: Negative.    Gastrointestinal: Negative.    Endocrine: Negative.    Genitourinary: Negative.    Musculoskeletal:  Positive for arthralgias  "(bilateral knees), gait problem (antalgic) and joint swelling (bilateral knees).   Skin: Negative.    Allergic/Immunologic: Negative.    Hematological: Negative.    Psychiatric/Behavioral: Negative.           Objective:  BP Readings from Last 1 Encounters:   10/30/24 130/72      Wt Readings from Last 1 Encounters:   10/30/24 93 kg (205 lb)      BMI:   Estimated body mass index is 35.19 kg/m² as calculated from the following:    Height as of this encounter: 5' 4\" (1.626 m).    Weight as of this encounter: 93 kg (205 lb).  BSA:   Estimated body surface area is 1.98 meters squared as calculated from the following:    Height as of this encounter: 5' 4\" (1.626 m).    Weight as of this encounter: 93 kg (205 lb).   Physical Exam  Constitutional:       General: She is not in acute distress.     Appearance: She is well-developed.   HENT:      Head: Normocephalic.   Eyes:      Conjunctiva/sclera: Conjunctivae normal.      Pupils: Pupils are equal, round, and reactive to light.   Pulmonary:      Effort: Pulmonary effort is normal. No respiratory distress.   Musculoskeletal:      Right knee: No effusion.      Left knee: No effusion.   Skin:     General: Skin is warm and dry.   Neurological:      Mental Status: She is alert and oriented to person, place, and time.   Psychiatric:         Behavior: Behavior normal.       Right Knee Exam     Tenderness   Right knee tenderness location: diffuse anterior.    Range of Motion   The patient has normal right knee ROM.    Tests   Varus: negative Valgus: negative    Other   Erythema: absent  Scars: absent  Sensation: normal  Pulse: present  Swelling: moderate  Effusion: no effusion present    Comments:  Soft tissue swelling and ecchymosis over anterior aspect of knee  No open wounds  Extensor mechanism intact  Calf soft, nontender      Left Knee Exam     Tenderness   The patient is experiencing tenderness in the patella.    Range of Motion   Extension:  normal   Flexion:  120     Tests "   Varus: negative Valgus: negative    Other   Erythema: absent  Scars: present (Well-healed incision with no evidence of infection.)  Sensation: normal  Pulse: present  Swelling: mild  Effusion: no effusion present    Comments:  No open wounds  Extensor mechanism intact  Calf soft, nontender            I have personally reviewed pertinent films in PACS and my interpretation is x-rays of bilateral knees reveal no acute fractures.  Total knee prosthesis on left is intact with no evidence of loosening.    Scribe Attestation      I,:  Yari Wallace PA-C am acting as a scribe while in the presence of the attending physician.:       I,:  Riki Ma MD personally performed the services described in this documentation    as scribed in my presence.:

## 2024-10-30 NOTE — PROGRESS NOTES
Outpatient Care Management Note:    Care manager called Ayleen and introduced myself. She states that she just saw orthopedics and everything is fine. She was driving so CM asked if I could call back later, but she declined. She states she has no needs and declined ongoing outreach. She will call her PCP with any questions.

## 2024-10-31 DIAGNOSIS — R11.0 NAUSEA: ICD-10-CM

## 2024-10-31 LAB
ALBUMIN SERPL ELPH-MCNC: 4.02 G/DL (ref 3.2–5.1)
ALBUMIN SERPL ELPH-MCNC: 61.8 % (ref 48–70)
ALBUMIN UR ELPH-MCNC: 27.9 %
ALPHA1 GLOB MFR UR ELPH: 10.4 %
ALPHA1 GLOB SERPL ELPH-MCNC: 0.39 G/DL (ref 0.15–0.47)
ALPHA1 GLOB SERPL ELPH-MCNC: 6 % (ref 1.8–7)
ALPHA2 GLOB MFR UR ELPH: 13.8 %
ALPHA2 GLOB SERPL ELPH-MCNC: 0.81 G/DL (ref 0.42–1.04)
ALPHA2 GLOB SERPL ELPH-MCNC: 12.5 % (ref 5.9–14.9)
B-GLOBULIN MFR UR ELPH: 6.3 %
BETA GLOB ABNORMAL SERPL ELPH-MCNC: 0.36 G/DL (ref 0.31–0.57)
BETA1 GLOB SERPL ELPH-MCNC: 5.5 % (ref 4.7–7.7)
BETA2 GLOB SERPL ELPH-MCNC: 4.6 % (ref 3.1–7.9)
BETA2+GAMMA GLOB SERPL ELPH-MCNC: 0.3 G/DL (ref 0.2–0.58)
GAMMA GLOB ABNORMAL SERPL ELPH-MCNC: 0.62 G/DL (ref 0.4–1.66)
GAMMA GLOB MFR UR ELPH: 41.6 %
GAMMA GLOB SERPL ELPH-MCNC: 9.6 % (ref 6.9–22.3)
IGG/ALB SER: 1.62 {RATIO} (ref 1.1–1.8)
INTERPRETATION UR IFE-IMP: NORMAL
INTERPRETATION UR IFE-IMP: NORMAL
PROT PATTERN SERPL ELPH-IMP: NORMAL
PROT PATTERN UR ELPH-IMP: NORMAL
PROT SERPL-MCNC: 6.5 G/DL (ref 6.4–8.2)
PROT UR-MCNC: 23 MG/DL

## 2024-10-31 PROCEDURE — 86335 IMMUNFIX E-PHORSIS/URINE/CSF: CPT | Performed by: PATHOLOGY

## 2024-10-31 PROCEDURE — 84165 PROTEIN E-PHORESIS SERUM: CPT | Performed by: PATHOLOGY

## 2024-10-31 PROCEDURE — 84166 PROTEIN E-PHORESIS/URINE/CSF: CPT | Performed by: PATHOLOGY

## 2024-10-31 PROCEDURE — 86334 IMMUNOFIX E-PHORESIS SERUM: CPT | Performed by: PATHOLOGY

## 2024-10-31 RX ORDER — ONDANSETRON 4 MG/1
4 TABLET, FILM COATED ORAL EVERY 8 HOURS PRN
Qty: 30 TABLET | Refills: 0 | Status: SHIPPED | OUTPATIENT
Start: 2024-10-31

## 2024-10-31 NOTE — TELEPHONE ENCOUNTER
Reason for call:   [x] Refill   [] Prior Auth  [] Other:     Office:   [x] PCP/Provider -   [] Specialty/Provider -     Medication: ondansetron (ZOFRAN) 4 mg tablet     Dose/Frequency: Take 1 tablet (4 mg total) by mouth every 8 (eight) hours as needed     Quantity: 30 tablet    Pharmacy:   [address] [place] - [facility]. [phone]    Does the patient have enough for 3 days?   [x] Yes   [] No - Send as HP to POD

## 2024-11-01 DIAGNOSIS — M54.50 LUMBAR PAIN: ICD-10-CM

## 2024-11-01 DIAGNOSIS — M79.605 BILATERAL LEG PAIN: ICD-10-CM

## 2024-11-01 DIAGNOSIS — M79.604 BILATERAL LEG PAIN: ICD-10-CM

## 2024-11-01 RX ORDER — TRAMADOL HYDROCHLORIDE 50 MG/1
50 TABLET ORAL 2 TIMES DAILY PRN
Qty: 60 TABLET | Refills: 0 | Status: SHIPPED | OUTPATIENT
Start: 2024-11-02

## 2024-11-04 ENCOUNTER — OFFICE VISIT (OUTPATIENT)
Dept: ENDOCRINOLOGY | Facility: HOSPITAL | Age: 64
End: 2024-11-04
Attending: INTERNAL MEDICINE
Payer: MEDICARE

## 2024-11-04 VITALS
DIASTOLIC BLOOD PRESSURE: 84 MMHG | HEIGHT: 64 IN | WEIGHT: 203.6 LBS | SYSTOLIC BLOOD PRESSURE: 124 MMHG | BODY MASS INDEX: 34.76 KG/M2 | HEART RATE: 78 BPM

## 2024-11-04 DIAGNOSIS — E04.1 THYROID NODULE: ICD-10-CM

## 2024-11-04 DIAGNOSIS — M81.0 AGE-RELATED OSTEOPOROSIS WITHOUT CURRENT PATHOLOGICAL FRACTURE: Primary | ICD-10-CM

## 2024-11-04 DIAGNOSIS — N25.81 SECONDARY HYPERPARATHYROIDISM OF RENAL ORIGIN (HCC): ICD-10-CM

## 2024-11-04 LAB — ALP BONE SERPL-MCNC: 23.1 UG/L

## 2024-11-04 PROCEDURE — 99205 OFFICE O/P NEW HI 60 MIN: CPT | Performed by: INTERNAL MEDICINE

## 2024-11-04 PROCEDURE — 96372 THER/PROPH/DIAG INJ SC/IM: CPT | Performed by: INTERNAL MEDICINE

## 2024-11-04 RX ORDER — PHENOL 1.4 %
600 AEROSOL, SPRAY (ML) MUCOUS MEMBRANE 2 TIMES DAILY WITH MEALS
COMMUNITY

## 2024-11-04 NOTE — PROGRESS NOTES
"Assessment/Plan:    Ayleen Moralez came into the [facility] Endocr[facility]ology Office today 11/04/24 to receive a Prolia injection. Ok per AC     Verbal consent obtained.  Consent given by: patient    Patient states patient has been medically healthy with no underlining concerns/complications.      Ayleen Moralez presents with no symptoms today.       All insturctions were reviewed with the patient.    If the patient should have any questions/concerns, advised patient to contacted [facility].       Subjective:     History provided by: patient    Patient ID: Ayleen Moralez is a 64 y.o. female      Objective:    Vitals:    11/04/24 0957   BP: 124/84   Pulse: 78   Weight: 92.4 kg (203 lb 9.6 oz)   Height: 5' 4\" (1.626 m)       Patient tolerated the injection well without any complications.  Injection site/s right arm.  Medication was provided by office.    Patient signed consent form yes   Patient signed ABN form yes (If no patient is not a medicare patient).   Patient waited 15 minutes after injection yes (This only applies to patient's receiving first time injection).       Last Visit: Visit date not found  Next visit:Visit date not found     "

## 2024-11-04 NOTE — PROGRESS NOTES
11/5/2024    Assessment & Plan      Diagnoses and all orders for this visit:    Age-related osteoporosis without current pathological fracture  -     Ambulatory Referral to Endocrinology  -     denosumab (PROLIA) subcutaneous injection 60 mg  -     Comprehensive metabolic panel; Future  -     Comprehensive metabolic panel; Future  -     T4, free; Future  -     TSH, 3rd generation; Future    Secondary hyperparathyroidism of renal origin (HCC)  -     Comprehensive metabolic panel; Future  -     Comprehensive metabolic panel; Future  -     T4, free; Future  -     TSH, 3rd generation; Future    Thyroid nodule  -     US thyroid; Future  -     Comprehensive metabolic panel; Future  -     T4, free; Future  -     TSH, 3rd generation; Future    Other orders  -     calcium carbonate (Calcium 600) 600 MG tablet; Take 600 mg by mouth 2 (two) times a day with meals        Assessment & Plan  1. Osteoporosis.  She has had a full workup by her rheumatologist to rule out secondary causes of osteoporosis, including multiple myeloma, thyroid disease, vitamin D deficiency.  She does have minor secondary hyperparathyroidism due to her renal failure but this should not be affecting her bone mineral density to any significant amount. She did not tolerate oral bisphosphonates due to GI issues and significant side effects. She tried alendronate but stopped it about a month ago after taking it for a couple of months due to nausea. She has worsening bone mineral density and a FRAX score for a 10-year risk of overall osteoporotic fractures is 28%, putting her at high risk of fracture. She will continue her calcium 600 mg twice a day and vitamin D 5000 IU daily. Prolia injection 60 mg every 6 months will be started, and she is able to get a Prolia injection today as her first one.    2. Secondary hyperparathyroidism.  She has mild secondary hyperparathyroidism with normal calcium of renal origin, which has been followed for quite a few years  and has been stable. Although this might be causing some minor effect on her bone mineral density, it will be followed over time.    3. Thyroid nodule.  She has had multiple thyroid nodules, with the last ultrasound performed in June 2019 and a nodule biopsied at that time. She has not had a repeat thyroid ultrasound since then. A thyroid ultrasound has been ordered to be performed at her earliest convenience. If anything changes based on her thyroid ultrasound, then this plan may need to be changed.    Follow-up  Return in 6 months for follow-up with our medical assistant with preceding CMP and for the second Prolia injection. She will then follow up in a year with the provider with blood work consisting of a CMP, TSH, and free T4, and for another Prolia injection.    I have spent a total time of 60 minutes in caring for this patient on the day of the visit/encounter including Diagnostic results, Prognosis, Risks and benefits of tx options, Instructions for management, Patient and family education, Importance of tx compliance, Risk factor reductions, Impressions, Counseling / Coordination of care, Documenting in the medical record, Reviewing / ordering tests, medicine, procedures  , and Obtaining or reviewing history  .      CC: Osteoporosis, parathyroid, thyroid nodule consult      History of Present Illness    HPI: Ayleen Moralez is a 64-year-old female here for evaluation and consultation regarding osteoporosis and parathyroid disease along with thyroid nodule.    She has a history of hyperprolactinemia and secondary hyperparathyroidism, previously managed by endocrinology in 2019. She also has a small thyroid nodule, which underwent fine needle aspiration biopsy in 07/2019, demonstrating benign pathology. The nodule was located in the right midpole. Her last thyroid ultrasound was in 06/2019. She has not had an ultrasound since then.    She is under the care of a rheumatologist for her arthritis. A DEXA  scan conducted in 12/2023 showed a slight worsening of her osteoporosis. She was prescribed alendronate once a week but discontinued it a month ago due to nausea. She has a history of frequent falls, including one yesterday, which resulted in a fractured right fibula and right wrist. She underwent surgery for her wrist. She also has spinal stenosis but no hip fractures from falls. She has lost some height, now measuring 5 feet 6 inches. She experiences low back pain and knee pain.    She has been taking omeprazole 40 mg daily for several years. She was prescribed prednisone during her COVID-19 infection but does not take it regularly. She occasionally uses a Symbicort inhaler. She has never received steroid injections or taken seizure medications. Her diet includes occasional milk, cheese twice a month, and [place] sprouts twice a week. She takes calcium 600 mg twice a day and vitamin D3 5000 units daily.    She reports no constipation, diarrhea, or abdominal pain. She occasionally experiences heart palpitations and hand tremors. She reports muscle weakness in her legs, arms, and back. She has numbness and tingling in her left knee but not in her feet, hands, or around her mouth. She does not report hair loss but has dry skin and brittle nails. She occasionally wakes up sweaty and feels cold. She does not experience confusion. Her weight has remained stable. She occasionally has difficulty swallowing rice. She has never received radiation treatment to her head or neck.    She has a history of kidney disease but not kidney stones. She is under the care of a nephrologist.    FAMILY HISTORY  Her mother had osteoporosis. Her niece has thyroid nodules.        Historical Information   Past Medical History:   Diagnosis Date    Aftercare following left knee joint replacement surgery 02/15/2024    Anxiety     Anxiety disorder     Arthritis     At risk for falls     Bipolar 2 disorder (HCC)     Chronic back pain     Chronic  kidney disease     Closed fracture of distal end of right fibula with routine healing 2020    COVID-19     in 2021    CVA (cerebral vascular accident) (HCC)     noted on MRI in the past    Depression     GERD (gastroesophageal reflux disease)     Hypercholesteremia     Hypernatremia     Hypertension     Hypokalemia     Idiopathic chronic pancreatitis (HCC) 2018    Intervertebral disc disorder with radiculopathy of lumbosacral region     resolved: 2015    Limb alert care status     LUE-fistula    Panic attacks     Pericardial effusion     PONV (postoperative nausea and vomiting)     Psychiatric problem     Radiculitis     resolved: 2015    Secondary renal hyperparathyroidism (HCC)     Stroke (HCC)     Vitamin D deficiency      Past Surgical History:   Procedure Laterality Date    BUNIONECTOMY      Left foot     CAST APPLICATION Right 2022    Procedure: Application short-arm thumb spica splint;  Surgeon: Lyndon Victoria MD;  [facility]: [facility];  Service: Orthopedics    COLON SURGERY      COLONOSCOPY  2021    DILATION AND CURETTAGE OF UTERUS      INDUCED       surgically induced    HI ARTERIOVENOUS ANASTOMOSIS OPEN DIRECT Left 2019    Procedure: CREATION FISTULA ARTERIOVENOUS (AV) left wrists possible left upper;  Surgeon: Andrey Quintero MD;  Location: [facility];  Service: Vascular    HI ARTHRP KNE CONDYLE&PLATU MEDIAL&LAT COMPARTMENTS Left 2024    Procedure: ARTHROPLASTY KNEE TOTAL SAME DAY;  Surgeon: Riki Ma MD;  Location: [facility];  Service: Orthopedics    HI CORRJ HLX [facility] DSTL METAR OSTEOT Left 2019    Procedure: Serge bunionectomy;  Surgeon: Munir Larkin DPM;  [facility];  Service: Podiatry    HI CORR HLX [facility] BNCTY [facility] DSTL METAR OSTEOT Right 8/3/2020    Procedure: BUNIONECTOMY RAFAEL;  Surgeon: Munir Larkin DPM;  Location: [facility];  Service: Podiatry    HI COR HLX [facility] PROX PHLX OSTEOT  Right 9/27/2021    Procedure: BUNIONECTOMY TAMEKA, right tameka osteotomy and 2nd claw toe correction;  Surgeon: James R Lachman, MD;  Location: [facility];  Service: Orthopedics    ME ERCP DX COLLECTION SPECIMEN BRUSHING/WASHING N/A 4/11/2018    Procedure: ENDOSCOPIC RETROGRADE CHOLANGIOPANCREATOGRAPHY (ERCP);  Surgeon: Alfredo Messina MD;  [facility];  Service: Gastroenterology    ME LAPAROSCOPY PROCTOPEXY PROLAPSE N/A 7/13/2018    Procedure: ROBOTIC SIGMOID RESECTION / RECTOPEXY;  Surgeon: ELPIDIO Mcnulty MD;  [facility];  Service: Colorectal    ME OPEN TREATMENT RADIAL SHAFT FRACTURE Right 10/11/2021    Procedure: OPEN REDUCTION W/ INTERNAL FIXATION (ORIF) RADIUS (WRIST), RIGHT DISTAL;  Surgeon: Riki Ma MD;  Location: [facility];  Service: Orthopedics    ME REMOVAL IMPLANT DEEP Right 6/23/2022    Procedure: Removal of hardware volar aspect right distal radius (distal radial plate and screws);  Surgeon: Lyndon Victoria MD;  Location: [facility];  Service: Orthopedics    ME SIGMOIDOSCOPY FLX DX W/COLLJ SPEC BR/WA IF PFRMD N/A 7/13/2018    Procedure: SIGMOIDOSCOPY FLEXIBLE;  Surgeon: ELPIDIO Mcnulty MD;  [facility];  Service: Colorectal    ME TR TDN RESTORE INTRNSC FUNCJ ALL 4 FNGRS Right 6/23/2022    Procedure: Right ring finger flexor digitorum superficialis to flexor pollicis longus tendon transfer;  Surgeon: Lyndon Victoria MD;  Location: [facility];  Service: Orthopedics    TUBAL LIGATION Bilateral 1997    US GUIDED THYROID BIOPSY  7/30/2019     Social History   Social History     Substance and Sexual Activity   Alcohol Use Not Currently     Social History     Substance and Sexual Activity   Drug Use Not Currently    Types: Hydrocodone, Marijuana    Comment: JESSE Abbasi has h/o marijuana abuse- not currently     Social History     Tobacco Use   Smoking Status Never   Smokeless Tobacco Never     Family History:   Family History   Problem Relation Age of Onset     Bipolar disorder Mother     Mental illness Mother         depression    Stroke Mother     Dementia Mother     Colon polyps Mother     Heart disease Father     Hypertension Father     Diabetes Father     Other Family         Back disorder    Diabetes Family     Heart disease Family     Hypertension Family     Stroke Family     Thyroid disease Family     Breast cancer Paternal Grandmother         age unknown    Breast cancer Paternal Aunt         age unknown    Breast cancer Maternal Aunt         age unknown    Mental illness Sister     Colon polyps Sister     Mental illness Sister     Heart disease Sister     No Known Problems Sister     Breast cancer Sister 68    Other Son         pituitary tumor    Hypertension Son     Obesity Son     No Known Problems Son     No Known Problems Maternal Grandmother     No Known Problems Maternal Grandfather     No Known Problems Paternal Grandfather     Breast cancer Paternal Aunt         age unknown    Substance Abuse Neg Hx         neg fam hx    Colon cancer Neg Hx        Meds/Allergies   Current Outpatient Medications   Medication Sig Dispense Refill    acetaminophen (TYLENOL) 500 mg tablet Take 2 tablets (1,000 mg total) by mouth every 8 (eight) hours 60 tablet 0    albuterol (Ventolin HFA) 90 mcg/act inhaler Inhale 2 puffs every 6 (six) hours as needed for wheezing or shortness of breath 8.5 g 3    AMILoride 5 mg tablet TAKE 1 TABLET BY MOUTH TWICE  DAILY 180 tablet 1    amLODIPine (NORVASC) 5 mg tablet Take 1 tablet (5 mg total) by mouth daily 90 tablet 1    ascorbic acid (VITAMIN C) 500 MG tablet Take 1 tablet (500 mg total) by mouth 2 (two) times a day 60 tablet 2    aspirin 81 mg chewable tablet Chew 1 tablet (81 mg total) 2 (two) times a day 60 tablet 0    budesonide-formoterol (Symbicort) 160-4.5 mcg/act inhaler Inhale 2 puffs 2 (two) times a day Rinse mouth after use. 10.2 g 11    calcium carbonate (Calcium 600) 600 MG tablet Take 600 mg by mouth 2 (two) times a day  with meals      carvedilol (COREG) 25 mg tablet TAKE 1 TABLET BY MOUTH TWICE  DAILY WITH MEALS 180 tablet 1    cholecalciferol (VITAMIN D3) 1,000 units tablet Take 5 tablets (5,000 Units total) by mouth daily 90 tablet 5    clonazePAM (KlonoPIN) 0.5 mg tablet Take 1 tablet (0.5 mg total) by mouth 3 (three) times a day 270 tablet 0    Cyanocobalamin (VITAMIN B 12 PO) Take 1,000 mcg by mouth in the morning      Diclofenac Sodium (VOLTAREN) 1 % Apply 2 g topically 4 (four) times a day for 7 days 56 g 0    ferrous sulfate 324 (65 Fe) mg Take 1 tablet (324 mg total) by mouth 2 (two) times a day before meals 60 tablet 2    fluvoxaMINE (LUVOX) 100 mg tablet Fluvoxamine 100m tablet in the morning ; 2 tablets at night 270 tablet 0    lamoTRIgine (LaMICtal) 200 MG tablet Lamotrigine 200m tablet in the morning , 1 tablet at night 180 tablet 3    montelukast (SINGULAIR) 10 mg tablet Take 1 tablet (10 mg total) by mouth daily at bedtime 30 tablet 5    Multiple Vitamin (MULTI-VITAMIN) tablet Take 1 tablet by mouth daily 30 tablet 2    omeprazole (PriLOSEC) 40 MG capsule TAKE 1 CAPSULE BY MOUTH DAILY  BEFORE BREAKFAST 90 capsule 1    ondansetron (ZOFRAN) 4 mg tablet Take 1 tablet (4 mg total) by mouth every 8 (eight) hours as needed for nausea or vomiting 30 tablet 0    QUEtiapine (SEROquel) 100 mg tablet Take 1 tablet (100 mg total) by mouth daily at bedtime Take in addition to the Seroquel 400 mg to total 500 mg daily at bedtime 90 tablet 0    QUEtiapine (SEROquel) 300 mg tablet Take 1 tablet (300 mg total) daily 90 tablet 0    QUEtiapine (SEROquel) 400 MG tablet Take 1 tablet (400 mg total) by mouth daily at bedtime Take with the Seroquel 100 mg to total 500 mg daily at bedtime 90 tablet 0    rosuvastatin (CRESTOR) 20 MG tablet Take 1 tablet (20 mg total) by mouth every evening 90 tablet 3    traMADol (ULTRAM) 50 mg tablet Take 1 tablet (50 mg total) by mouth 2 (two) times a day as needed for moderate pain Do not start  "before November 2, 2024. 60 tablet 0     No current facility-administered medications for this visit.     Allergies   Allergen Reactions    Molds & Smuts Nasal Congestion    Bee Pollen Nasal Congestion     Other reaction(s): Nasal Congestion    Pollen Extract Nasal Congestion       Objective   Vitals: Blood pressure 124/84, pulse 78, height 5' 4\" (1.626 m), weight 92.4 kg (203 lb 9.6 oz), not currently breastfeeding.  Invasive Devices       Line  Duration             Hemodialysis AV Fistula 11/18/19 Left  1814 days    Hemodialysis AV Fistula 11/10/23 Left Forearm 361 days                    Physical Exam    HEENT: No lid lag, stare, proptosis, or periorbital edema.  No moon facies.  Negative to [place] sign.  Neck: Thyroid normal in size but irregular in feel.  No discrete nodules palpable.  No masses or lymphadenopathy of the neck.  No supraclavicular fat pad or buffalo hump.  Lungs: Clear to auscultation.  Coronary: Regular rate and rhythm.  No murmurs.  Extremities: No tremor of the outstretched hands.  No CVA tenderness.  No lower extremity edema.  Neurologic: Patellar deep tendon reflexes normal.      The history was obtained from the review of the chart and from the patient.    Lab Results:      Blood work performed on 10/30/2024 showed a serum protein electrophoresis with an abnormal distribution in the gamma region and an immunofixation with no monoclonal immunoglobulins.    Celiac disease panel was negative.    TSH is 2.785    25-hydroxy vitamin D level 69.9.    PTH levels 111.8 with an bone specific alkaline phosphatase of 23.1    Lab Results   Component Value Date    CREATININE 2.58 (H) 06/25/2024    CREATININE 2.18 (H) 05/28/2024    CREATININE 2.39 (H) 05/06/2024    BUN 30 (H) 06/25/2024     02/27/2017    K 4.9 06/25/2024     06/25/2024    CO2 23 06/25/2024     eGFR   Date Value Ref Range Status   06/25/2024 19 ml/min/1.73sq m Final         Lab Results   Component Value Date    HDL 49 (L) " 05/06/2024    TRIG 211 (H) 05/06/2024       Lab Results   Component Value Date    ALT 17 06/25/2024    AST 20 06/25/2024    ALKPHOS 124 (H) 06/25/2024       Lab Results   Component Value Date    FREET4 0.57 (L) 06/12/2019     DEXA scan:    DXA SCAN performed on 12/27/2023     CLINICAL HISTORY: 63-year-old postmenopausal female.  OTHER RISK FACTORS: Prior fracture as a result of minor injury, parental history of hip fracture status post minor injury, limited mobility, PPI therapy, SSRI, secondary hyperparathyroidism.     PHARMACOLOGIC THERAPY FOR OSTEOPOROSIS: None.     TECHNIQUE: Bone densitometry was performed using a Hologic Horizon A bone densitometer.  Regions of interest appear properly placed.        COMPARISON: 6/16/2021.     RESULTS:     LUMBAR SPINE  Level: L1-L4:  BMD: 0.843 gm/cm2  T-score: -1.9        LEFT TOTAL HIP:  BMD: 0.757 gm/cm2  T-score: -1.5     LEFT FEMORAL NECK:  BMD: 0.622 gm/cm2  T score: -2.0     LEFT FOREARM:  33% RADIUS BMD: 0.531 gm/cm2  T-score: -2.6        IMPRESSION:     1. Osteoporosis. Based on the left radius.     2.  Since a DXA study from 6/16/2021, there has been:  A  STATISTICALLY SIGNIFICANT DECREASE in bone mineral density of 0.053 g/cm2 (5.9%) in the lumbar spine.        3.  The 10 year risk of hip fracture is 2.1% with the 10 year risk of major osteoporotic fracture being 28% as calculated by the [employer] fracture risk assessment tool (FRAX, which is based on data generated by the WHO Collaborating [place]   for Metabolic Bone Diseases).     4.  The current NOF guidelines recommend treating patients with a T-score of -2.5 or less in the lumbar spine or hips, or in post-menopausal women and men over the age of 50 with low bone mass (osteopenia) and a FRAX 10 year risk score of >3% for hip   fracture and/or >20% for major osteoporotic fracture.     5.  The NOF recommends follow-up DXA in 1-2 years after initiating therapy for osteoporosis and every 2 years  thereafter. More frequent evaluation is appropriate for patients with conditions associated with rapid bone loss, such as glucocorticoid   therapy. The interval between DXA screenings may be longer for individuals without major risk factors and initial T-score in the normal or upper low bone mass range.        The FRAX algorithm has certain limitations:  -FRAX has not been validated in patients currently or previously treated with pharmacotherapy for osteoporosis.  In such patients, clinical judgment must be exercised in interpreting FRAX scores.  -Prior hip, vertebral and humeral fragility fractures appear to confer greater risk of subsequent fracture than fractures at other sites (this is especially true for individuals with severe vertebral fractures), but quantification of this incremental   risk is not possible with FRAX.  -FRAX underestimates fracture risk in patients with history of multiple fragility fractures.  -FRAX may underestimate fracture risk in patients with history of frequent falls.  -It is not appropriate to use FRAX to monitor treatment response.             Future Appointments   Date Time Provider Department Center   11/20/2024  5[facility]   11/21/2024  2:00 PM [address]   12/4/2024  9:00 AM Nancy Mallory LCSW PF [facility]   12/19/2024 10:00 AM Alfredo Messina MD [facility]   12/19/2024 11:20 AM DO ARNAV RgOWN [facility]   1/16/2025 11:00 AM EMERITA Lynn PF [facility]   1/31/2025 11:30 AM Arturo Mills DO [facility]   5/7/2025 11:15 AM ENDOCRINOLOGY NURSE [facility]IN [facility]   11/12/2025 11:00 AM Zahra Sandra MD [facility]   11/12/2025 11:00 AM ENDOCRINOLOGY NURSE [facility]RLIN [facility]

## 2024-11-04 NOTE — PATIENT INSTRUCTIONS
The blood work shows no other causes of osteoporosis.     The thyroid nodule has not been evaluated since 2019, we'll order thyroid ultrasound.     Ok to start prolia injections every 6 months.     Follow up in 6 months with medical assistant with blood work and for prolia injection.     Follow up in 1 year with blood work and  with provider and for prolia injection.

## 2024-11-06 ENCOUNTER — TELEPHONE (OUTPATIENT)
Age: 64
End: 2024-11-06

## 2024-11-06 NOTE — TELEPHONE ENCOUNTER
Patient requesting a right knee xray due to a fall she had recently.     Offered appt but she declined. Would like the xray first and then take it from there.     Please call to discuss. Thank you!
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
## 2024-11-07 DIAGNOSIS — M25.561 ACUTE PAIN OF RIGHT KNEE: ICD-10-CM

## 2024-11-08 ENCOUNTER — APPOINTMENT (OUTPATIENT)
Dept: LAB | Facility: HOSPITAL | Age: 64
End: 2024-11-08
Payer: MEDICARE

## 2024-11-08 ENCOUNTER — HOSPITAL ENCOUNTER (OUTPATIENT)
Dept: RADIOLOGY | Facility: HOSPITAL | Age: 64
End: 2024-11-08
Payer: MEDICARE

## 2024-11-08 DIAGNOSIS — E22.1 HYPERPROLACTINEMIA (HCC): ICD-10-CM

## 2024-11-08 DIAGNOSIS — M25.561 ACUTE PAIN OF RIGHT KNEE: ICD-10-CM

## 2024-11-08 DIAGNOSIS — I10 ESSENTIAL HYPERTENSION: ICD-10-CM
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
## 2024-11-08 DIAGNOSIS — R73.01 IMPAIRED FASTING GLUCOSE: ICD-10-CM

## 2024-11-08 LAB
ALBUMIN SERPL BCG-MCNC: 4.3 G/DL (ref 3.5–5)
ALP SERPL-CCNC: 146 U/L (ref 34–104)
ALT SERPL W P-5'-P-CCNC: 18 U/L (ref 7–52)
ANION GAP SERPL CALCULATED.3IONS-SCNC: 8 MMOL/L (ref 4–13)
AST SERPL W P-5'-P-CCNC: 22 U/L (ref 13–39)
BILIRUB SERPL-MCNC: 0.33 MG/DL (ref 0.2–1)
BUN SERPL-MCNC: 35 MG/DL (ref 5–25)
CALCIUM SERPL-MCNC: 9.2 MG/DL (ref 8.4–10.2)
CHLORIDE SERPL-SCNC: 106 MMOL/L (ref 96–108)
CO2 SERPL-SCNC: 25 MMOL/L (ref 21–32)
CREAT SERPL-MCNC: 2.48 MG/DL (ref 0.6–1.3)
EST. AVERAGE GLUCOSE BLD GHB EST-MCNC: 143 MG/DL
GFR SERPL CREATININE-BSD FRML MDRD: 20 ML/MIN/1.73SQ M
GLUCOSE P FAST SERPL-MCNC: 103 MG/DL (ref 65–99)
HBA1C MFR BLD: 6.6 %
POTASSIUM SERPL-SCNC: 4.9 MMOL/L (ref 3.5–5.3)
PROT SERPL-MCNC: 7 G/DL (ref 6.4–8.4)
SODIUM SERPL-SCNC: 139 MMOL/L (ref 135–147)

## 2024-11-08 PROCEDURE — 83036 HEMOGLOBIN GLYCOSYLATED A1C: CPT

## 2024-11-08 PROCEDURE — 36415 COLL VENOUS BLD VENIPUNCTURE: CPT

## 2024-11-08 PROCEDURE — 80053 COMPREHEN METABOLIC PANEL: CPT

## 2024-11-08 PROCEDURE — 73564 X-RAY EXAM KNEE 4 OR MORE: CPT

## 2024-11-11 ENCOUNTER — TELEPHONE (OUTPATIENT)
Age: 64
End: 2024-11-11

## 2024-11-12 ENCOUNTER — TELEPHONE (OUTPATIENT)
Age: 64
End: 2024-11-12

## 2024-11-20 ENCOUNTER — OFFICE VISIT (OUTPATIENT)
Dept: PODIATRY | Facility: CLINIC | Age: 64
End: 2024-11-20

## 2024-11-20 VITALS
HEIGHT: 67 IN | HEART RATE: 85 BPM | DIASTOLIC BLOOD PRESSURE: 83 MMHG | SYSTOLIC BLOOD PRESSURE: 131 MMHG | WEIGHT: 203 LBS | BODY MASS INDEX: 31.86 KG/M2

## 2024-11-20 DIAGNOSIS — F31.30 BIPOLAR AFFECTIVE DISORDER, CURRENT EPISODE DEPRESSED, CURRENT EPISODE SEVERITY UNSPECIFIED (HCC): ICD-10-CM

## 2024-11-20 DIAGNOSIS — B35.1 ONYCHOMYCOSIS: Primary | ICD-10-CM

## 2024-11-20 PROCEDURE — NCFTCARE PR NON-COVERED FOOT CARE: Performed by: PODIATRIST

## 2024-11-20 NOTE — PROGRESS NOTES
PATIENT:  Ayleen Moralez  1960    ASSESSMENT/PLAN:  1. Onychomycosis                   The patient was educated in her diagnosis.  Instructed skin care and protection.  Nails trimmed.  RA in 12 weeks.             HPI:  Ayleen Moralez is a 64 y.o.year old female seen for chief complaint of elongated nails. The patient denied any acute pedal disorder.      PAST MEDICAL HISTORY:  Past Medical History:   Diagnosis Date    Aftercare following left knee joint replacement surgery 02/15/2024    Anxiety     Anxiety disorder     Arthritis     At risk for falls     Bipolar 2 disorder (HCC)     Chronic back pain     Chronic kidney disease     Closed fracture of distal end of right fibula with routine healing 2020    COVID-19     in 2021    CVA (cerebral vascular accident) (HCC)     noted on MRI in the past    Depression     GERD (gastroesophageal reflux disease)     Hypercholesteremia     Hypernatremia     Hypertension     Hypokalemia     Idiopathic chronic pancreatitis (HCC) 2018    Intervertebral disc disorder with radiculopathy of lumbosacral region     resolved: 2015    Limb alert care status     LUE-fistula    Panic attacks     Pericardial effusion     PONV (postoperative nausea and vomiting)     Psychiatric problem     Radiculitis     resolved: 2015    Secondary renal hyperparathyroidism (HCC)     Stroke (HCC)     Vitamin D deficiency        PAST SURGICAL HISTORY:  Past Surgical History:   Procedure Laterality Date    BUNIONECTOMY      Left foot     CAST APPLICATION Right 2022    Procedure: Application short-arm thumb spica splint;  Surgeon: Lyndon Victoria MD;  Location:  MAIN OR;  Service: Orthopedics    COLON SURGERY      COLONOSCOPY  2021    DILATION AND CURETTAGE OF UTERUS      INDUCED       surgically induced    TX ARTERIOVENOUS ANASTOMOSIS OPEN DIRECT Left 2019    Procedure: CREATION FISTULA ARTERIOVENOUS (AV) left wrists  possible left upper;  Surgeon: Andrey Quintero MD;  Location: [facility];  Service: Vascular    ID ARTHRP KNE CONDYLE&PLATU MEDIAL&LAT COMPARTMENTS Left 2/5/2024    Procedure: ARTHROPLASTY KNEE TOTAL SAME DAY;  Surgeon: Riki Ma MD;  Location: [facility];  Service: Orthopedics    ID CORRJ HLX VLGS BNCTY SESMDC DSTL METAR OSTEOT Left 7/1/2019    Procedure: Serge bunionectomy;  Surgeon: Munir Larkin DPM;  Location[facility];  Service: Podiatry    ID CORRJ HLX VLGS BNCTY SESMDC DSTL METAR OSTEOT Right 8/3/2020    Procedure: BUNIONECTOMY RAFAEL;  Surgeon: Munir Larkin DPM;  [facility];  Service: Podiatry    ID CORRJ HLX VLGS BNCTY SESMDC PROX PHLX OSTEOT Right 9/27/2021    Procedure: BUNIONECTOMY TAMEKA, right tameka osteotomy and 2nd claw toe correction;  Surgeon: James R Lachman, MD;  Location: [facility];  Service: Orthopedics    ID ERCP DX COLLECTION SPECIMEN BRUSHING/WASHING N/A 4/11/2018    Procedure: ENDOSCOPIC RETROGRADE CHOLANGIOPANCREATOGRAPHY (ERCP);  Surgeon: Alfredo Messina MD;  [facility];  Service: Gastroenterology    ID LAPAROSCOPY PROCTOPEXY PROLAPSE N/A 7/13/2018    Procedure: ROBOTIC SIGMOID RESECTION / RECTOPEXY;  Surgeon: ELPIDIO Mcnulty MD;  Location: [facility];  Service: Colorectal    ID OPEN TREATMENT RADIAL SHAFT FRACTURE Right 10/11/2021    Procedure: OPEN REDUCTION W/ INTERNAL FIXATION (ORIF) RADIUS (WRIST), RIGHT DISTAL;  Surgeon: Riki Ma MD;  [facility];  Service: Orthopedics    ID REMOVAL IMPLANT DEEP Right 6/23/2022    Procedure: Removal of hardware volar aspect right distal radius (distal radial plate and screws);  Surgeon: Lyndon Victoria MD;  [facility];  Service: Orthopedics    ID SIGMOIDOSCOPY FLX DX W/COLLJ SPEC BR/WA IF PFRMD N/A 7/13/2018    Procedure: SIGMOIDOSCOPY FLEXIBLE;  Surgeon: ELPIDIO Mcnulty MD;  [facility];  Service: Colorectal    ID TR TDN RESTORE INTRNSC FUNCJ ALL 4 FNGRS Right 6/23/2022     Procedure: Right ring finger flexor digitorum superficialis to flexor pollicis longus tendon transfer;  Surgeon: Lyndon Victoria MD;  Location:  MAIN OR;  Service: Orthopedics    TUBAL LIGATION Bilateral 1997    US GUIDED THYROID BIOPSY  2019        ALLERGIES:  Molds & smuts, Bee pollen, and Pollen extract    MEDICATIONS:  Current Outpatient Medications   Medication Sig Dispense Refill    acetaminophen (TYLENOL) 500 mg tablet Take 2 tablets (1,000 mg total) by mouth every 8 (eight) hours 60 tablet 0    albuterol (Ventolin HFA) 90 mcg/act inhaler Inhale 2 puffs every 6 (six) hours as needed for wheezing or shortness of breath 8.5 g 3    AMILoride 5 mg tablet TAKE 1 TABLET BY MOUTH TWICE  DAILY 180 tablet 1    amLODIPine (NORVASC) 5 mg tablet Take 1 tablet (5 mg total) by mouth daily 90 tablet 1    ascorbic acid (VITAMIN C) 500 MG tablet Take 1 tablet (500 mg total) by mouth 2 (two) times a day 60 tablet 2    aspirin 81 mg chewable tablet Chew 1 tablet (81 mg total) 2 (two) times a day 60 tablet 0    budesonide-formoterol (Symbicort) 160-4.5 mcg/act inhaler Inhale 2 puffs 2 (two) times a day Rinse mouth after use. 10.2 g 11    calcium carbonate (Calcium 600) 600 MG tablet Take 600 mg by mouth 2 (two) times a day with meals      carvedilol (COREG) 25 mg tablet TAKE 1 TABLET BY MOUTH TWICE  DAILY WITH MEALS 180 tablet 1    cholecalciferol (VITAMIN D3) 1,000 units tablet Take 5 tablets (5,000 Units total) by mouth daily 90 tablet 5    clonazePAM (KlonoPIN) 0.5 mg tablet Take 1 tablet (0.5 mg total) by mouth 3 (three) times a day 270 tablet 0    Cyanocobalamin (VITAMIN B 12 PO) Take 1,000 mcg by mouth in the morning      ferrous sulfate 324 (65 Fe) mg Take 1 tablet (324 mg total) by mouth 2 (two) times a day before meals 60 tablet 2    fluvoxaMINE (LUVOX) 100 mg tablet Fluvoxamine 100m tablet in the morning ; 2 tablets at night 270 tablet 0    lamoTRIgine (LaMICtal) 200 MG tablet Lamotrigine 200m tablet  in the morning , 1 tablet at night 180 tablet 3    montelukast (SINGULAIR) 10 mg tablet Take 1 tablet (10 mg total) by mouth daily at bedtime 30 tablet 5    Multiple Vitamin (MULTI-VITAMIN) tablet Take 1 tablet by mouth daily 30 tablet 2    omeprazole (PriLOSEC) 40 MG capsule TAKE 1 CAPSULE BY MOUTH DAILY  BEFORE BREAKFAST 90 capsule 1    ondansetron (ZOFRAN) 4 mg tablet Take 1 tablet (4 mg total) by mouth every 8 (eight) hours as needed for nausea or vomiting 30 tablet 0    QUEtiapine (SEROquel) 100 mg tablet Take 1 tablet (100 mg total) by mouth daily at bedtime Take in addition to the Seroquel 400 mg to total 500 mg daily at bedtime 90 tablet 0    QUEtiapine (SEROquel) 300 mg tablet Take 1 tablet (300 mg total) daily 90 tablet 0    QUEtiapine (SEROquel) 400 MG tablet Take 1 tablet (400 mg total) by mouth daily at bedtime Take with the Seroquel 100 mg to total 500 mg daily at bedtime 90 tablet 0    rosuvastatin (CRESTOR) 20 MG tablet Take 1 tablet (20 mg total) by mouth every evening 90 tablet 3    traMADol (ULTRAM) 50 mg tablet Take 1 tablet (50 mg total) by mouth 2 (two) times a day as needed for moderate pain Do not start before November 2, 2024. 60 tablet 0    Diclofenac Sodium (VOLTAREN) 1 % Apply 2 g topically 4 (four) times a day for 7 days 56 g 0     No current facility-administered medications for this visit.       SOCIAL HISTORY:  Social History     Socioeconomic History    Marital status:      Spouse name: None    Number of children: None    Years of education: None    Highest education level: None   Occupational History    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   Tobacco Use    Smoking status: Never    Smokeless tobacco: Never   Vaping Use    Vaping status: Never Used   Substance and Sexual Activity    Alcohol use: Not Currently    Drug use: Not Currently     Types: Hydrocodone, Marijuana     Comment: JESSE Abbasi has h/o marijuana abuse- not currently    Sexual activity: Not Currently   Other  Topics Concern    None   Social History Narrative    Daily caffeine consumption 2-3 servings a day    Lives with family.    No living will.     Has dentures---no dental care.     Primary language--English.     Feels safe at home.     Social Drivers of Health     Financial Resource Strain: Medium Risk (12/7/2022)    Overall Financial Resource Strain (CARDIA)     Difficulty of Paying Living Expenses: Somewhat hard   Food Insecurity: No Food Insecurity (4/18/2024)    Nursing - Inadequate Food Risk Classification     Worried About Running Out of Food in the Last Year: Never true     Ran Out of Food in the Last Year: Never true     Ran Out of Food in the Last Year: Not on file   Transportation Needs: No Transportation Needs (4/18/2024)    PRAPARE - Transportation     Lack of Transportation (Medical): No     Lack of Transportation (Non-Medical): No   Physical Activity: Inactive (4/19/2021)    Exercise Vital Sign     Days of Exercise per Week: 0 days     Minutes of Exercise per Session: 0 min   Stress: Stress Concern Present (4/19/2021)    [employer] - Occupational Stress Questionnaire     Feeling of Stress : To some extent   Social Connections: Not on file   Intimate Partner Violence: Not At Risk (4/19/2021)    Humiliation, Afraid, Rape, and Kick questionnaire     Fear of Current or Ex-Partner: No     Emotionally Abused: No     Physically Abused: No     Sexually Abused: No   Housing Stability: High Risk (4/18/2024)    Housing Stability Vital Sign     Unable to Pay for Housing in the Last Year: No     Number of Times Moved in the Last Year: 1     Homeless in the Last Year: Yes        REVIEW OF SYSTEMS:  GENERAL: No fever or chills  HEART: No chest pain, or palpitation  RESPIRATORY:  No acute SOB or cough  GI: No Nausea, vomit or diarrhea  NEUROLOGIC: No syncope or acute weakness    PHYSICAL EXAM:  VASCULAR EXAM  Pedal pulses are intact.  CRT is WNL.     NEUROLOGIC EXAM  Sensation is intact to  light touch.  No focal neurologic deficit.          DERMATOLOGIC EXAM:   No cellulitis noted.  The patient has thick, elongated toenails.      MUSCULOSKELETAL EXAM:   No acute joint pain, edema, or redness.  No acute musculoskeletal problem.

## 2024-11-21 ENCOUNTER — HOSPITAL ENCOUNTER (OUTPATIENT)
Dept: ULTRASOUND IMAGING | Facility: HOSPITAL | Age: 64
End: 2024-11-21
Attending: INTERNAL MEDICINE
Payer: MEDICARE

## 2024-11-21 DIAGNOSIS — E04.1 THYROID NODULE: ICD-10-CM

## 2024-11-21 PROCEDURE — 76536 US EXAM OF HEAD AND NECK: CPT

## 2024-11-21 RX ORDER — QUETIAPINE FUMARATE 100 MG/1
TABLET, FILM COATED ORAL
Qty: 90 TABLET | Refills: 3 | Status: SHIPPED | OUTPATIENT
Start: 2024-11-21

## 2024-11-21 RX ORDER — QUETIAPINE FUMARATE 300 MG/1
300 TABLET, FILM COATED ORAL DAILY
Qty: 90 TABLET | Refills: 3 | Status: SHIPPED | OUTPATIENT
Start: 2024-11-21

## 2024-11-25 ENCOUNTER — TELEPHONE (OUTPATIENT)
Age: 64
End: 2024-11-25

## 2024-11-25 NOTE — TELEPHONE ENCOUNTER
Patient called because she would like to talk to doctor fly about weight loss medication. She asked if doctor paulina would be able to follow up with her on this please     thank you

## 2024-11-25 NOTE — TELEPHONE ENCOUNTER
Spoke to pt and advised she follow up with her PCP in regards to weight loss injections. Pt verbalized understanding.

## 2024-11-27 ENCOUNTER — RESULTS FOLLOW-UP (OUTPATIENT)
Dept: FAMILY MEDICINE CLINIC | Facility: HOSPITAL | Age: 64
End: 2024-11-27

## 2024-11-30 DIAGNOSIS — E78.00 HYPERCHOLESTEREMIA: ICD-10-CM

## 2024-11-30 DIAGNOSIS — K21.9 GASTROESOPHAGEAL REFLUX DISEASE, UNSPECIFIED WHETHER ESOPHAGITIS PRESENT: ICD-10-CM

## 2024-12-02 RX ORDER — ROSUVASTATIN CALCIUM 20 MG/1
20 TABLET, COATED ORAL EVERY EVENING
Qty: 90 TABLET | Refills: 3 | Status: SHIPPED | OUTPATIENT
Start: 2024-12-02

## 2024-12-02 RX ORDER — OMEPRAZOLE 40 MG/1
40 CAPSULE, DELAYED RELEASE ORAL
Qty: 90 CAPSULE | Refills: 3 | Status: SHIPPED | OUTPATIENT
Start: 2024-12-02

## 2024-12-02 NOTE — PROGRESS NOTES
PT Evaluation     Today's date: 12/3/2024  Patient name: Ayleen Moralez  : 1960  MRN: [id number]  Referring provider: Bette Harp DO  Dx:   Encounter Diagnosis     ICD-10-CM    1. Encounter for examination following treatment at hospital  [facility] Ambulatory referral to Physical Therapy      2. Fall (on) (from) other stairs and steps, subsequent encounter  W10.8XXD Ambulatory referral to Physical Therapy      3. Contusion of face, subsequent encounter  S00.83XD Ambulatory referral to Physical Therapy      4. Contusion of right knee, initial encounter  S80.01XA Ambulatory referral to Physical Therapy          Start Time: 1330  Stop Time: 1415  Total time in clinic (min): 45 minutes    Assessment  Impairments: abnormal coordination, abnormal gait, abnormal muscle tone, abnormal or restricted ROM, abnormal movement, activity intolerance, impaired balance, impaired physical strength, lacks appropriate home exercise program, safety issue and weight-bearing intolerance    Assessment details: Patient is a 64 y.o. year old female presenting to OPPT s/p diagnosis of hx of falls and ambulatory dysfunction.  Patient demonstrates decreased strength, endurance, balance, and functional independence.  Pt ambulates at gait speed of 1.00 m/s, classifying them as a unlimited community ambulator. FGA & MiniBest shows they are at risk for falls scoring, 23/30 & 21/28 respectively. Patient requires assistance for ADLs and IADLs, no longer able to live on second floor where her room was. Given written HEP with focus on LE strength and standing balance. Educated on reducing speed at which she does activities as her rushing can lead to loses of balance, pt in agreement. She will benefit from skilled PT interventions to maximize return to PLOF and independence as able.  Thank you for this referral and the ability to participate in the care of this patient.       Prognosis: good    Goals  In 4 weeks, patient will:  1.  Improve TUGC to  within 20% of TUG to show improved dual task cost.  2.  Demonstrate independence with simple HEP    In 4-10 weeks, patient will:  1.  Demonstrate consistent carryover with HEP and walking program.  2.  Improve gait speed by at least 0.12 m/s to meet MCID value for older adults and reduce fall risk.  3.  Improve MiniBest by at least 4 points to show improved dynamic mobility and reduce fall risk.  4.  Improve ABC to at least above 67% to show reduced risk of falling and improved balance confidence.  5.  Improve FGA by at least 4 points to show improved dynamic balance while walking.  6.  Return to second floor safely to return to PLOF        Plan  Patient would benefit from: skilled physical therapy    Planned therapy interventions: neuromuscular re-education, manual therapy, patient education, home exercise program, therapeutic exercise, therapeutic activities and gait training    Frequency: 2x week  Duration in weeks: 8  Plan of Care beginning date: 2024  Plan of Care expiration date: 2025  Treatment plan discussed with: patient      Subjective Evaluation    History of Present Illness  Mechanism of injury: Present at PT: had some falls, typically associated with stepping up. Last one was on . Reaching for something and knocked the tv on top of herself. Bruised on her butt. She feels she is dragging her feet. Says she has a drop foot diagnosed roughly 10 years ago (Right sided). Will shuffle her feet.    No longer living on second floor out of safety of doing stairs.    Goals: return to living on second floor, better balance  Patient Goals  Patient goals for therapy: increased strength and improved balance    Pain  Current pain ratin  At best pain ratin  At worst pain ratin  Location: L knee  Quality: dull ache and radiating    Social Support  Stairs in house: yes   Lives in: multiple-level home  Lives with: sister.    Employment status: not working    Diagnostic Tests  No diagnostic  tests performed  Treatments  No previous or current treatments      Objective    Balance Test 12/2   ABC: 56%   MiniBest: 21/28   FGA 23/30   Gait Speed (m/s): 1.00   5x Sit To Stand (s):    TUG/TUGC: 8.92 / 13.63         MCTSIB Number of Seconds   Feet Together, Eyes Open 30   Feet Together, Eyes Closed 30   Feet Together, Eyes Open Foam 30   Feet Together, Eyes Closed Foam 30 severe sway       Coordination Left Right   Heel To Shin Intact limited ROM Intact limited ROM   Finger To Nose intact intact   Rapid Alternating Movement     UE     LE         Sensation Left Right   Kinesthesia intact intact   Light Touch intact intact   Sharp/Dull     2 Point Discrimination         Gait Assessment: Minimal shuffling noted on todays visit. However, lacks arm swing at any speed. No issues with foot clearance.              Daily Treatment Diary     Precautions: FALLS,   CO-MORBIDITIES:  HEP ACCESS CODE:   FOTO Completed On:     POC Expires Reeval for Medicare to be completed  Unit Limit Auth Expiration Date PT/OT/STVisit Limit   1/31/2025 By visit 10 na 12/31/24 BOMN    Completed on visit 1                   Auth Status DATE 12/2        NA Visit # 1         Remaining         Testing         FGA 23/30         MiniBest 21/28         TUG/TUGC 8.92 / 13s         Gait speed 1.00m/s                           THERAPEUTIC EXERCISE HEP         Mini squat          SLRx4                                                  NEUROMUSCULAR REEDUCATION           Dynamic balance          Static balance          Uneven surfaces          Obstacle course                                                                                                              THERAPEUTIC ACTIVITY                                                  GAIT TRAINING          Stairs                                        MODALITIES

## 2024-12-03 ENCOUNTER — EVALUATION (OUTPATIENT)
Facility: CLINIC | Age: 64
End: 2024-12-03
Payer: MEDICARE

## 2024-12-03 DIAGNOSIS — M54.50 LUMBAR PAIN: ICD-10-CM

## 2024-12-03 DIAGNOSIS — S80.01XA CONTUSION OF RIGHT KNEE, INITIAL ENCOUNTER: ICD-10-CM

## 2024-12-03 DIAGNOSIS — S00.83XD CONTUSION OF FACE, SUBSEQUENT ENCOUNTER: ICD-10-CM

## 2024-12-03 DIAGNOSIS — M79.604 BILATERAL LEG PAIN: ICD-10-CM
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
## 2024-12-03 DIAGNOSIS — M79.605 BILATERAL LEG PAIN: ICD-10-CM

## 2024-12-03 DIAGNOSIS — Z09 ENCOUNTER FOR EXAMINATION FOLLOWING TREATMENT AT HOSPITAL: ICD-10-CM

## 2024-12-03 PROCEDURE — 97163 PT EVAL HIGH COMPLEX 45 MIN: CPT

## 2024-12-03 NOTE — TELEPHONE ENCOUNTER
Reason for call:   [x] Refill   [] Prior Auth  [] Other:     Office:   [x] PCP/Provider -   [] Specialty/Provider -     Medication: traMADol (ULTRAM) 50 mg tablet Take 1 tablet (50 mg total) by mouth 2 (two) times a day as needed for moderate pain       Pharmacy: [id number] - [place] - [address] [facility].      Does the patient have enough for 3 days?   [x] Yes   [] No - Send as HP to POD

## 2024-12-04 ENCOUNTER — SOCIAL WORK (OUTPATIENT)
Dept: BEHAVIORAL/MENTAL HEALTH CLINIC | Facility: CLINIC | Age: 64
End: 2024-12-04
Payer: MEDICARE

## 2024-12-04 ENCOUNTER — DOCUMENTATION (OUTPATIENT)
Dept: BEHAVIORAL/MENTAL HEALTH CLINIC | Facility: CLINIC | Age: 64
End: 2024-12-04

## 2024-12-04 DIAGNOSIS — F42.2 MIXED OBSESSIONAL THOUGHTS AND ACTS: Primary | ICD-10-CM

## 2024-12-04 DIAGNOSIS — F31.4 BIPOLAR DISORDER, CURRENT EPISODE DEPRESSED, SEVERE, WITHOUT PSYCHOTIC FEATURES (HCC): ICD-10-CM

## 2024-12-04 PROCEDURE — 90834 PSYTX W PT 45 MINUTES: CPT | Performed by: COUNSELOR

## 2024-12-04 RX ORDER — TRAMADOL HYDROCHLORIDE 50 MG/1
50 TABLET ORAL 2 TIMES DAILY PRN
Qty: 60 TABLET | Refills: 0 | Status: SHIPPED | OUTPATIENT
Start: 2024-12-04

## 2024-12-04 NOTE — PROGRESS NOTES
TRANSFER SUMMARY    [facility]    Patient Name Ayleen Moralez     Date of Birth: 64 y.o. 1960      MRN: [id number]    Admission Date: many years ago    Date of Transfer: December 4, 2024    Admission Diagnosis:     Bipolar D/O    Current Diagnosis:     No diagnosis found.    Reason for Admission: Ayleen presented for treatment due to mood instability.    Progress in Treatment: Ayleen was seen for Individual Couseling.    Episodes of Higher Level of Care: No    Transfer request Initiated by: Psychiatrist: N/A Therapist: Nancy Mallory LCSW    Reason for Transfer Request: clinician leaving practice    Does this individual need a clinician with specialized training/expertise?: No    Is this client working with any other [facility] Providers/Therapists? Psychiatrist: EMERITA Funez    Other pertinent issues: None    Are there any specific individuals who would be a “best fit” or who have already agreed to accept this transfer request?      Psychiatrist: N/A  Therapist: Nikki Jefferson  Rationale: Not Applicable    Attempts to maintain the current therapeutic relationship: No    Transfer request routed to Clinical Supervisor for input and/or approval.     Comments from other involved providers and/or clinical coordinator : None    Nancy Mallory LCSW12/04/24

## 2024-12-04 NOTE — PSYCH
"Behavioral Health Psychotherapy Progress Note    Psychotherapy Provided: Individual Psychotherapy     1. Mixed obsessional thoughts and acts        2. Bipolar disorder, current episode depressed, severe, without psychotic features (HCC)            Goals addressed in session: Goal 1     DATA: Last session with Ayleen and then she will be transferring to Nikki Jefferson for continued talk therapy support due to this clinician retiring at the end of the year.  Ayleen shared her feelings about her years of working with this clinician and we discussed some progress she's made.  Ayleen continues to feel torn regarding staying with her sister and niece or moving into her own apartment if something comes available.  She also continues to miss her two sons who are estranged from her.  Ayleen expresses her feelings in our session and said she appreciates being able to do this saying \"my sister says she doesn't want to hear it anymore.\"  Ayleen said she will be going to PT for balance issues as she has had many falls/knee surgeries and other medical issues.  Her mood appears to be stable at this time.  During this session, this clinician used the following therapeutic modalities: Supportive Psychotherapy    Substance Abuse was not addressed during this session. If the client is diagnosed with a co-occurring substance use disorder, please indicate any changes in the frequency or amount of use: N/A. Stage of change for addressing substance use diagnoses: No substance use/Not applicable    ASSESSMENT:  Ayleen Moralez presents with a Euthymic/ normal mood.     her affect is Normal range and intensity, which is congruent, with her mood and the content of the session. The client has made some progress on their goals.    Ayleen Moralez presents with a none risk of suicide, none risk of self-harm, and none risk of harm to others.    For any risk assessment that surpasses a \"low\" rating, a safety plan must be developed.    A " safety plan was indicated: no  If yes, describe in detail N/A    PLAN: Between sessions, Ayleen Moralez will continue to work toward her treatment goals. At the next session, the therapist will use Supportive Psychotherapy to address goals/needs/concerns.    Behavioral Health Treatment Plan and Discharge Planning: Ayleen Moralez is aware of and agrees to continue to work on their treatment plan. They have identified and are working toward their discharge goals. yes    Visit start and stop times:    12/04/24  Start Time: 0900  Stop Time: 0950  Total Visit Time: 50 minutes

## 2024-12-05 ENCOUNTER — TELEPHONE (OUTPATIENT)
Dept: FAMILY MEDICINE CLINIC | Facility: HOSPITAL | Age: 64
End: 2024-12-05

## 2024-12-06 NOTE — TELEPHONE ENCOUNTER
Medication was just dispensed for a 90 day supply in October. Patient now due for a fill until beginning of January.

## 2024-12-10 ENCOUNTER — OFFICE VISIT (OUTPATIENT)
Facility: CLINIC | Age: 64
End: 2024-12-10
Payer: MEDICARE

## 2024-12-10 DIAGNOSIS — Z09 ENCOUNTER FOR EXAMINATION FOLLOWING TREATMENT AT HOSPITAL: Primary | ICD-10-CM

## 2024-12-10 DIAGNOSIS — S00.83XD CONTUSION OF FACE, SUBSEQUENT ENCOUNTER: ICD-10-CM
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
## 2024-12-10 DIAGNOSIS — S80.01XA CONTUSION OF RIGHT KNEE, INITIAL ENCOUNTER: ICD-10-CM

## 2024-12-10 PROCEDURE — 97112 NEUROMUSCULAR REEDUCATION: CPT

## 2024-12-10 NOTE — PROGRESS NOTES
Daily Note     Today's date: 12/10/2024  Patient name: Ayleen Moralez  : 1960  MRN: [id number]  Referring provider: Bette Harp DO  Dx:   Encounter Diagnosis     ICD-10-CM    1. Encounter for examination following treatment at hospital  Z       2. Fall (on) (from) other stairs and steps, subsequent encounter  W10.8XXD       3. Contusion of face, subsequent encounter  S00.83XD       4. Contusion of right knee, initial encounter  S80.01XA           Start Time: 930  Stop Time: 1015  Total time in clinic (min): 45 minutes    Subjective: No falls recently. Has done the stairs a few times with okay stability.       Objective: See treatment diary below      Assessment:  No issues with foot clearance while navigating obstacles with use of vision. Minimally more instability with water carry dual task. Without vision takes more caution and more guarded with her gait, especially over uneven surfaces. Head turns with mild instability and head turns.  With tidal tank more difficulty stepping up due to weight of tank + visual obstruction. Patient would benefit from continued PT      Plan: Continue per plan of care. LE strength. Reducing visual reliance. Minimize shuffling.     Daily Treatment Diary     Precautions: FALLS,   CO-MORBIDITIES:  HEP ACCESS CODE:   FOTO Completed On:     POC Expires Reeval for Medicare to be completed  Unit Limit Auth Expiration Date PT/OT/STVisit Limit   2025 By visit 10 na 24 BOMN    Completed on visit 1                   Auth Status DATE 12/2 12/10       NA Visit # 1 2        Remaining         Testing         FGA          MiniBest          TUG/TUGC 8.92 / 13s         Gait speed 1.00m/s                           THERAPEUTIC EXERCISE HEP         Mini squat          SLRx4                                                  NEUROMUSCULAR REEDUCATION           Dynamic balance   HT gait 20ft x8 SOLO    Low hurdles fwd 10 step through, 6 bwd w water balance, 2x ea direction  w water balance SOLO    4' step up tidal tank x4, 4lb unilat x12 SOLO       Static balance          Uneven surfaces   Fwd SOLO 10 laps       Obstacle course                                                                                                              THERAPEUTIC ACTIVITY                                                  GAIT TRAINING          Stairs                                          MODALITIES

## 2024-12-11 ENCOUNTER — APPOINTMENT (OUTPATIENT)
Facility: CLINIC | Age: 64
End: 2024-12-11
Payer: MEDICARE

## 2024-12-11 ENCOUNTER — RA CDI HCC (OUTPATIENT)
Dept: OTHER | Facility: HOSPITAL | Age: 64
End: 2024-12-11

## 2024-12-12 ENCOUNTER — APPOINTMENT (OUTPATIENT)
Facility: CLINIC | Age: 64
End: 2024-12-12
Payer: MEDICARE

## 2024-12-19 ENCOUNTER — OFFICE VISIT (OUTPATIENT)
Dept: FAMILY MEDICINE CLINIC | Facility: HOSPITAL | Age: 64
End: 2024-12-19
Payer: MEDICARE

## 2024-12-19 ENCOUNTER — TELEPHONE (OUTPATIENT)
Age: 64
End: 2024-12-19

## 2024-12-19 VITALS
OXYGEN SATURATION: 97 % | SYSTOLIC BLOOD PRESSURE: 138 MMHG | WEIGHT: 205 LBS | HEIGHT: 67 IN | BODY MASS INDEX: 32.18 KG/M2 | HEART RATE: 78 BPM | DIASTOLIC BLOOD PRESSURE: 78 MMHG

## 2024-12-19 DIAGNOSIS — I10 ESSENTIAL (PRIMARY) HYPERTENSION: Primary | ICD-10-CM

## 2024-12-19 DIAGNOSIS — M21.371 RIGHT FOOT DROP: ICD-10-CM

## 2024-12-19 DIAGNOSIS — I10 PRIMARY HYPERTENSION: ICD-10-CM

## 2024-12-19 DIAGNOSIS — F41.0 PANIC DISORDER (EPISODIC PAROXYSMAL ANXIETY): ICD-10-CM

## 2024-12-19 DIAGNOSIS — E66.09 CLASS 1 OBESITY DUE TO EXCESS CALORIES WITH SERIOUS COMORBIDITY AND BODY MASS INDEX (BMI) OF 32.0 TO 32.9 IN ADULT: ICD-10-CM

## 2024-12-19 DIAGNOSIS — E66.811 CLASS 1 OBESITY DUE TO EXCESS CALORIES WITH SERIOUS COMORBIDITY AND BODY MASS INDEX (BMI) OF 32.0 TO 32.9 IN ADULT: ICD-10-CM

## 2024-12-19 DIAGNOSIS — N18.4 STAGE 4 CHRONIC KIDNEY DISEASE (HCC): ICD-10-CM

## 2024-12-19 DIAGNOSIS — M81.0 AGE-RELATED OSTEOPOROSIS WITHOUT CURRENT PATHOLOGICAL FRACTURE: ICD-10-CM

## 2024-12-19 DIAGNOSIS — R26.2 DIFFICULTY IN WALKING, NOT ELSEWHERE CLASSIFIED: ICD-10-CM

## 2024-12-19 PROBLEM — F31.4 BIPOLAR DISORDER, CURRENT EPISODE DEPRESSED, SEVERE, WITHOUT PSYCHOTIC FEATURES (HCC): Status: RESOLVED | Noted: 2023-11-14 | Resolved: 2024-12-19

## 2024-12-19 PROCEDURE — G2211 COMPLEX E/M VISIT ADD ON: HCPCS | Performed by: INTERNAL MEDICINE

## 2024-12-19 PROCEDURE — 99214 OFFICE O/P EST MOD 30 MIN: CPT | Performed by: INTERNAL MEDICINE

## 2024-12-19 NOTE — ASSESSMENT & PLAN NOTE
Lab Results   Component Value Date    EGFR 20 11/08/2024    EGFR 19 06/25/2024    EGFR 23 05/28/2024    CREATININE 2.48 (H) 11/08/2024    CREATININE 2.58 (H) 06/25/2024    CREATININE 2.18 (H) 05/28/2024   Sees Dr. Mills in January   Has left arm access min place but has not yet needed dialysis

## 2024-12-19 NOTE — TELEPHONE ENCOUNTER
Pt called to speak with her Nephrology office.  Informed pt that she has called Urology in error.  Transferred pt to Nephrology as per her request

## 2024-12-19 NOTE — PROGRESS NOTES
Assessment/Plan:     Diagnosis ICD-10-CM Associated Orders   1. Essential (primary) hypertension  I10       2. Stage 4 chronic kidney disease (HCC)  N18.4       3. Panic disorder (episodic paroxysmal anxiety)  F41.0       4. Age-related osteoporosis without current pathological fracture  M81.0       5. Difficulty in walking, not elsewhere classified  R26.2       6. Right foot drop  M21.371       7. Primary hypertension  I10       8. Class 1 obesity due to excess calories with serious comorbidity and body mass index (BMI) of 32.0 to 32.9 in adult  E66.811 Ambulatory Referral to Weight Management    E66.09     Z68.32           Problem List Items Addressed This Visit        Cardiovascular and Mediastinum    Essential (primary) hypertension - Primary       Musculoskeletal and Integument    Age-related osteoporosis without current pathological fracture       Genitourinary    Stage 4 chronic kidney disease (HCC)    Lab Results   Component Value Date    EGFR 20 11/08/2024    EGFR 19 06/25/2024    EGFR 23 05/28/2024    CREATININE 2.48 (H) 11/08/2024    CREATININE 2.58 (H) 06/25/2024    CREATININE 2.18 (H) 05/28/2024   Sees Dr. Mills in January   Has left arm access min place but has not yet needed dialysis            Behavioral Health    Panic disorder (episodic paroxysmal anxiety)    Seeing [facility]- stable   Has new therpist            Care Coordination    Difficulty in walking, not elsewhere classified       Orthopedic/Musculoskeletal    Right foot drop    Doing pt- will refer to ortho- Dr. Ma- will send message to see if he feels a brace may be helpful        Other Visit Diagnoses       Primary hypertension          Class 1 obesity due to excess calories with serious comorbidity and body mass index (BMI) of 32.0 to 32.9 in adult        Relevant Orders    Ambulatory Referral to Weight Management            Return in about 2 months (around 2/19/2025).      Subjective:    Patient ID: Ayleen Martineztitokevyn is a 64  y.o. female    Here for appointment   She is requesting wegovy rx- she called [facility] and they said it would be covered for her- she has not done an organized weight loss program  Will refer to weight management program   To continue withpt          The following portions of the patient's history were reviewed and updated as appropriate: allergies, current medications and problem list.     Review of Systems   Constitutional:  Negative for activity change.   HENT:  Negative for congestion.    Respiratory:  Negative for shortness of breath.    Cardiovascular:  Negative for chest pain and palpitations.   All other systems reviewed and are negative.        Objective:      Current Outpatient Medications:   •  acetaminophen (TYLENOL) 500 mg tablet, Take 2 tablets (1,000 mg total) by mouth every 8 (eight) hours, Disp: 60 tablet, Rfl: 0  •  albuterol (Ventolin HFA) 90 mcg/act inhaler, Inhale 2 puffs every 6 (six) hours as needed for wheezing or shortness of breath, Disp: 8.5 g, Rfl: 3  •  AMILoride 5 mg tablet, TAKE 1 TABLET BY MOUTH TWICE  DAILY, Disp: 180 tablet, Rfl: 1  •  amLODIPine (NORVASC) 5 mg tablet, Take 1 tablet (5 mg total) by mouth daily, Disp: 90 tablet, Rfl: 1  •  ascorbic acid (VITAMIN C) 500 MG tablet, Take 1 tablet (500 mg total) by mouth 2 (two) times a day, Disp: 60 tablet, Rfl: 2  •  aspirin 81 mg chewable tablet, Chew 1 tablet (81 mg total) 2 (two) times a day, Disp: 60 tablet, Rfl: 0  •  budesonide-formoterol (Symbicort) 160-4.5 mcg/act inhaler, Inhale 2 puffs 2 (two) times a day Rinse mouth after use., Disp: 10.2 g, Rfl: 11  •  calcium carbonate (Calcium 600) 600 MG tablet, Take 600 mg by mouth 2 (two) times a day with meals, Disp: , Rfl:   •  carvedilol (COREG) 25 mg tablet, TAKE 1 TABLET BY MOUTH TWICE  DAILY WITH MEALS, Disp: 180 tablet, Rfl: 1  •  cholecalciferol (VITAMIN D3) 1,000 units tablet, Take 5 tablets (5,000 Units total) by mouth daily, Disp: 90 tablet, Rfl: 5  •  Cyanocobalamin (VITAMIN  B 12 PO), Take 1,000 mcg by mouth in the morning, Disp: , Rfl:   •  ferrous sulfate 324 (65 Fe) mg, Take 1 tablet (324 mg total) by mouth 2 (two) times a day before meals, Disp: 60 tablet, Rfl: 2  •  fluvoxaMINE (LUVOX) 100 mg tablet, Fluvoxamine 100m tablet in the morning ; 2 tablets at night, Disp: 270 tablet, Rfl: 0  •  lamoTRIgine (LaMICtal) 200 MG tablet, Lamotrigine 200m tablet in the morning , 1 tablet at night, Disp: 180 tablet, Rfl: 3  •  montelukast (SINGULAIR) 10 mg tablet, Take 1 tablet (10 mg total) by mouth daily at bedtime, Disp: 30 tablet, Rfl: 5  •  Multiple Vitamin (MULTI-VITAMIN) tablet, Take 1 tablet by mouth daily, Disp: 30 tablet, Rfl: 2  •  omeprazole (PriLOSEC) 40 MG capsule, TAKE 1 CAPSULE BY MOUTH DAILY  BEFORE BREAKFAST, Disp: 90 capsule, Rfl: 3  •  QUEtiapine (SEROquel) 100 mg tablet, TAKE 1 TABLET BY MOUTH DAILY AT  BEDTIME TAKE IN ADDITION TO THE  SEROQUEL 400 MG TO TOTAL 500 MG  DAILY AT BEDTIME, Disp: 90 tablet, Rfl: 3  •  QUEtiapine (SEROquel) 300 mg tablet, TAKE 1 TABLET BY MOUTH DAILY, Disp: 90 tablet, Rfl: 3  •  QUEtiapine (SEROquel) 400 MG tablet, Take 1 tablet (400 mg total) by mouth daily at bedtime Take with the Seroquel 100 mg to total 500 mg daily at bedtime, Disp: 90 tablet, Rfl: 0  •  rosuvastatin (CRESTOR) 20 MG tablet, TAKE 1 TABLET BY MOUTH IN THE  EVENING, Disp: 90 tablet, Rfl: 3  •  traMADol (ULTRAM) 50 mg tablet, Take 1 tablet (50 mg total) by mouth 2 (two) times a day as needed for moderate pain, Disp: 60 tablet, Rfl: 0  •  [START ON 2025] clonazePAM (KlonoPIN) 0.5 mg tablet, Take 1 tablet (0.5 mg total) by mouth 3 (three) times a day Do not start before 2025., Disp: 270 tablet, Rfl: 0  •  Diclofenac Sodium (VOLTAREN) 1 %, Apply 2 g topically 4 (four) times a day for 7 days, Disp: 56 g, Rfl: 0  •  ondansetron (ZOFRAN) 4 mg tablet, Take 1 tablet (4 mg total) by mouth every 8 (eight) hours as needed for nausea or vomiting, Disp: 30 tablet, Rfl:  "1    Blood pressure 138/78, pulse 78, height 5' 7\" (1.702 m), weight 93 kg (205 lb), SpO2 97%, not currently breastfeeding.     Physical Exam  Vitals and nursing note reviewed.   Constitutional:       General: She is not in acute distress.     Appearance: She is not ill-appearing.   HENT:      Head: Normocephalic.      Right Ear: Tympanic membrane normal. There is no impacted cerumen.      Left Ear: Tympanic membrane normal. There is no impacted cerumen.      Nose: No congestion.      Mouth/Throat:      Pharynx: No posterior oropharyngeal erythema.   Eyes:      General:         Right eye: No discharge.         Left eye: No discharge.   Cardiovascular:      Rate and Rhythm: Normal rate and regular rhythm.      Heart sounds: No murmur heard.  Pulmonary:      Breath sounds: No wheezing or rhonchi.   Abdominal:      General: There is no distension.      Tenderness: There is no abdominal tenderness.   Musculoskeletal:      Comments: Right foot drop   Lymphadenopathy:      Cervical: No cervical adenopathy.   Skin:     Findings: No rash.   Neurological:      Mental Status: She is alert.   Psychiatric:         Thought Content: Thought content normal.        "

## 2024-12-19 NOTE — ASSESSMENT & PLAN NOTE
Doing pt- will refer to ortho- Dr. Ma- will send message to see if he feels a brace may be helpful

## 2024-12-23 ENCOUNTER — TELEPHONE (OUTPATIENT)
Age: 64
End: 2024-12-23

## 2024-12-23 ENCOUNTER — APPOINTMENT (OUTPATIENT)
Facility: CLINIC | Age: 64
End: 2024-12-23
Payer: MEDICARE

## 2024-12-23 NOTE — TELEPHONE ENCOUNTER
Caller: Patient    Doctor: OP PT    Reason for call:     Patient calling to check on appointment for today, but she is not scheduled for today, confirmed.    Call back#: n/a

## 2024-12-23 NOTE — TELEPHONE ENCOUNTER
Patient called in requesting to speak to office regarding MARLYS.     Writer warm transferred to OP support services for further assistance.

## 2024-12-26 DIAGNOSIS — R11.0 NAUSEA: ICD-10-CM

## 2024-12-26 DIAGNOSIS — F31.81 BIPOLAR 2 DISORDER (HCC): Chronic | ICD-10-CM

## 2024-12-26 RX ORDER — ONDANSETRON 4 MG/1
4 TABLET, FILM COATED ORAL EVERY 8 HOURS PRN
Qty: 30 TABLET | Refills: 1 | Status: SHIPPED | OUTPATIENT
Start: 2024-12-26

## 2024-12-26 NOTE — TELEPHONE ENCOUNTER
Reason for call:   [x] Refill   [] Prior Auth  [] Other:     Office:   [x] PCP/Provider - Bette Harp   [] Specialty/Provider -     Medication: clonazePAM (KlonoPIN) 0.5 mg     Dose/Frequency: Take 1 tablet (0.5 mg total) by mouth 3 (three) times a day,     Quantity: 270    Pharmacy: [facility]    Does the patient have enough for 3 days?   [] Yes   [x] No - Send as HP to POD

## 2024-12-26 NOTE — TELEPHONE ENCOUNTER
Reason for call:   [x] Refill   [] Prior Auth  [] Other:     Office:   [x] PCP/Provider - Bette Harp, DO   [] Specialty/Provider -     Medication:     ondansetron (ZOFRAN) 4 mg tablet       Dose/Frequency:     4 mg, Oral, Every 8 hours PRN       Quantity: 30    Pharmacy: [facility] [place] - [address] [facility].     Does the patient have enough for 3 days?   [] Yes   [x] No - Send as HP to POD

## 2024-12-27 RX ORDER — CLONAZEPAM 0.5 MG/1
0.5 TABLET ORAL 3 TIMES DAILY
Qty: 270 TABLET | Refills: 0 | Status: SHIPPED | OUTPATIENT
Start: 2025-01-02

## 2024-12-30 DIAGNOSIS — F42.9 OBSESSIVE-COMPULSIVE DISORDER, UNSPECIFIED TYPE: ICD-10-CM

## 2024-12-30 NOTE — TELEPHONE ENCOUNTER
Reason for call:   [x] Refill   [] Prior Auth  [] Other:     Office:   [] PCP/Provider -   [x] Specialty/Provider - Psych/Kimberly Mora     Medication: fluvoxaMINE (LUVOX) 100 mg tablet     Dose/Frequency: 1 tablet in the morning ; 2 tablets at night     Quantity: 270    Pharmacy: [facility] - [place], [place]    Does the patient have enough for 3 days?   [x] Yes   [] No - Send as HP to POD

## 2024-12-31 DIAGNOSIS — F31.81 BIPOLAR 2 DISORDER (HCC): Chronic | ICD-10-CM

## 2024-12-31 RX ORDER — FLUVOXAMINE MALEATE 100 MG
TABLET ORAL
Qty: 270 TABLET | Refills: 0 | Status: SHIPPED | OUTPATIENT
Start: 2024-12-31

## 2024-12-31 RX ORDER — CLONAZEPAM 0.5 MG/1
0.5 TABLET ORAL 3 TIMES DAILY
Qty: 270 TABLET | Refills: 0 | Status: SHIPPED | OUTPATIENT
Start: 2025-01-02

## 2024-12-31 NOTE — TELEPHONE ENCOUNTER
Patient called stating that [facility] contacted her stating that the doctor did not approve her Clonazepam. Told patient that medication was still pending, waiting for approval. Patient verbalized understanding

## 2025-01-02 ENCOUNTER — OFFICE VISIT (OUTPATIENT)
Facility: CLINIC | Age: 65
End: 2025-01-02
Payer: MEDICARE

## 2025-01-02 DIAGNOSIS — M79.604 BILATERAL LEG PAIN: ICD-10-CM
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
## 2025-01-02 DIAGNOSIS — M54.50 LUMBAR PAIN: ICD-10-CM

## 2025-01-02 DIAGNOSIS — S00.83XD CONTUSION OF FACE, SUBSEQUENT ENCOUNTER: ICD-10-CM

## 2025-01-02 DIAGNOSIS — Z09 ENCOUNTER FOR EXAMINATION FOLLOWING TREATMENT AT HOSPITAL: Primary | ICD-10-CM

## 2025-01-02 DIAGNOSIS — S80.01XA CONTUSION OF RIGHT KNEE, INITIAL ENCOUNTER: ICD-10-CM

## 2025-01-02 DIAGNOSIS — M79.605 BILATERAL LEG PAIN: ICD-10-CM

## 2025-01-02 PROCEDURE — 97112 NEUROMUSCULAR REEDUCATION: CPT

## 2025-01-02 NOTE — PROGRESS NOTES
Daily Note     Today's date: 2025  Patient name: Ayleen Moralez  : 1960  MRN: [id number]  Referring provider: Bette Harp DO  Dx:   Encounter Diagnosis     ICD-10-CM    1. Encounter for examination following treatment at hospital  Z       2. Fall (on) (from) other stairs and steps, subsequent encounter  W10.8XXD       3. Contusion of face, subsequent encounter  S00.83XD       4. Contusion of right knee, initial encounter  S80.01XA             Start Time: 1330  Stop Time: 1415  Total time in clinic (min): 45 minutes    Subjective: Had one fall the other day was at [place] and tripped over someone's desk. Was able to get up on her own. Uninjured.       Objective: See treatment diary below      Assessment:  Difficulty maintaining prolonged single limb stance, better with more support with ball rolls. Tandem gait with moderate difficulty/instability. More instability when patient rushing, when taking her time overall performs better. Even on compliant surfaces shows fair success with occasional step backward to catch herself. Patient would benefit from continued PT      Plan: Continue per plan of care. LE strength. Reducing visual reliance. Minimize shuffling. Single limb stance.     Daily Treatment Diary     Precautions: FALLS,   CO-MORBIDITIES:  HEP ACCESS CODE:   FOTO Completed On:     POC Expires Reeval for Medicare to be completed  Unit Limit Auth Expiration Date PT/OT/STVisit Limit   2025 By visit 10 na 25 BOMN    Completed on visit 1                   Auth Status DATE 12/2 12/10 1/2      NA Visit # 1 2 3       Remaining         Testing         FGA          MiniBest          TUG/TUGC 8.92 / 13s         Gait speed 1.00m/s                           THERAPEUTIC EXERCISE HEP         Mini squat          SLRx4                                                  NEUROMUSCULAR REEDUCATION           Dynamic balance   HT gait 20ft x8 SOLO    Low hurdles fwd 10 step through, 6 bwd w water  "balance, 2x ea direction w water balance SOLO    4' step up tidal tank x4, 4lb unilat x12 SOLO SOLO tandem gait 20ft x6    SOLO low hurdles step through 6x fwd, 4x water carry step through    6' step runner step up max difficulty x10    Foam beam lat step 3x ea direction    Tidal tank bwd stepping 100ft x1    Ball toss fwd SOLO 20ft x8      Static balance    Ball roll 30\" x3 ea LE SOLO SLS focus      Uneven surfaces   Fwd SOLO 10 laps       Obstacle course                                                                                                              THERAPEUTIC ACTIVITY                                                  GAIT TRAINING          Stairs                                          MODALITIES                                        "

## 2025-01-03 RX ORDER — TRAMADOL HYDROCHLORIDE 50 MG/1
50 TABLET ORAL 2 TIMES DAILY PRN
Qty: 60 TABLET | Refills: 0 | Status: SHIPPED | OUTPATIENT
Start: 2025-01-03

## 2025-01-06 ENCOUNTER — APPOINTMENT (OUTPATIENT)
Facility: CLINIC | Age: 65
End: 2025-01-06
Payer: MEDICARE

## 2025-01-06 NOTE — PSYCH
Psychiatric Medication Management - Behavioral Health   Ayleen Moralez 64 y.o. female MRN: [id number]    This note was not shared with the patient due to reasonable likelihood of causing patient harm     Reason for Visit: Follow-up for medication management    Date of Visit: 2025    Assessment & Plan  Bipolar disorder, current episode manic severe with psychotic features (HCC)         Bipolar affective disorder, current episode depressed, current episode severity unspecified (HCC)    Orders:    QUEtiapine (SEROquel) 400 MG tablet; Take 1 tablet (400 mg total) by mouth daily at bedtime Take with the Seroquel 100 mg to total 500 mg daily at bedtime    QUEtiapine (SEROquel) 100 mg tablet; Take 1 tablet (100 mg total) by mouth in the morning    QUEtiapine (SEROquel) 300 mg tablet; Take 1 tablet (300 mg total) by mouth in the morning    Bipolar I disorder, most recent episode manic (HCC)    Orders:    lamoTRIgine (LaMICtal) 200 MG tablet; Lamotrigine 200m tablet in the morning , 1 tablet at night    Obsessive-compulsive disorder, unspecified type    Orders:    fluvoxaMINE (LUVOX) 100 mg tablet; Fluvoxamine 100m tablet in the morning ; 2 tablets at night    Encounter for long-term (current) use of medications    Orders:    Lipid panel; Future         Assessment/Plan:     Impression:  Bipolar Disorder, depressed without psychotic features  Generalized Anxiety Disorder  R/O OCD     Continue Seroquel 400 mg and 100 mg (to total 500 mg) at bedtime for mood  Continue Seroquel 300 mg daily for mood  Continue Luvox 100 mg (takes 1 in the AM and 2 at night) for depression and anxiety  Continue Lamictal 200 mg (takes 1 in the AM and 1 at night) for mood  Continue Clonazepam 0.5 mg TID PRN for anxiety/sleep- (PRESCRIBED BY PCP)  LIPID PANEL ORDERED  Recommend outpatient therapy-Will continue with Arabella at [facility]   Medical follow up with PCP as needed  Aware of 24 hour and weekend coverage for urgent  "situations accessed by calling [facility] main practice number  Follow up in 3 months      Treatment Plan:  Next treatment plan due 3/18/2025. Next crisis plan due 5/7/2025.     Treatment Recommendations:      Risks, Benefits And Possible Side Effects Of Medications:  Risks, benefits, and possible side effects of medications explained to patient and family, they verbalize understanding    Controlled Medication Discussion: No records found for controlled prescriptions according to [place] Prescription Drug Monitoring Program.        Subjective:  Medication compliance: Yes  Medication side effects: Denies    HPI   Ayleen is a 64 year old female being seen today for follow-up for medication management.  Patient has psychiatric diagnoses including schizophrenia,  bipolar 1 disorder. Patient is currently being observed on Seroquel 400 mg and 100 mg at  at bedtime, Seroquel 300 mg in the morning, Luvox 100 mg  (takes 1 in the morning and 2 at night ), Lamictal 200 mg  (takes 1 in the morning and 1 at night ). Patient is currently connected connected to outpatient therapy with Nancy Mallory at [facility]. No additional services in place at this time.     Ayleen reports medication compliance and denies any side effects at this time.  She still discusses how she wants to leave her sister's house as it stressful and she feels her niece and sister \"snoop on her\" .  She states she will place things at certain areas and they are moved.  She also states her sister lies a lot.  Ayleen reports her mood is \"okay\".  Sleep and appetite remain adequate.  Energy levels and motivation are okay.  Ayleen adamantly denies any suicidal thoughts, plan, or intent.  She denies HI or AH.  She does report staying her mother and father when she thinks about them a lot, however she is aware that this is her imagination, and she does not really misses them.  She reports mild irritability due to " living at her sisters.  But denies any aggression.  Ayleen does not endorse any mood swings, frequent crying spells, or periods of hanh or elevated mood.  She states her anxiety and depression are well-controlled on this medication regimen.  We discussed obtaining a lipid panel and also discussed her A1c is elevated at 6.6, which Ayleen was aware of.  She will start to see a new therapist next week.  She is unsure of the name.  No medication changes at this time.    Review Of Systems:     Constitutional Negative   ENT Negative   Cardiovascular Negative   Respiratory Negative   Gastrointestinal Negative   Genitourinary Negative   Musculoskeletal Negative   Integumentary Negative   Neurological Negative   Endocrine Negative     Past Medical History:   Patient Active Problem List   Diagnosis    Pure hypercholesterolemia, unspecified    Spondylolisthesis at L5-S1 level    Renal cyst    Vitamin D deficiency, unspecified    Secondary hyperparathyroidism of renal origin (HCC)    Herniated nucleus pulposus, L5-S1    Other chronic pancreatitis (HCC)    Primary osteoarthritis of right knee    Age-related osteoporosis without current pathological fracture    Rectal prolapse    Elevated LFTs    Essential (primary) hypertension    Hyperprolactinemia (HCC)    Obsessive-compulsive disorder, unspecified    Panic disorder (episodic paroxysmal anxiety)    Eating disorder    Hyperparathyroidism, unspecified (HCC)    Other pericardial effusion (noninflammatory)    Benign neoplasm of colon    Drug-induced constipation    Cerebral infarction, unspecified (HCC)    Contusion of right knee    Abnormal chest CT    Hyperphosphatemia    Abnormal thyroid exam    Thyroid nodule    Moderate persistent asthma, uncomplicated    Stage 4 chronic kidney disease (HCC)    Unilateral primary osteoarthritis, left knee    Steal syndrome dialysis vascular access (HCC)    Fistula, left hand    Right patellofemoral syndrome    Bilateral sacroiliitis (HCC)     Hallux valgus, right    Acquired hallux interphalangeus of right foot    Effusion of right knee    Left knee tendonitis    Family history of cardiac disorder in father    Claw toe, acquired, right    Injury of plantar plate, right, initial encounter    Acquired hallux interphalangeus, right    Closed Colles' fracture of right radius    Aftercare following surgery of the musculoskeletal system    Acute shoulder pain    Gastro-esophageal reflux disease without esophagitis    Injury of right thumb    Pain of right upper extremity    Mixed obsessional thoughts and acts    Eating disorder, unspecified    Other chronic pain    Pes anserinus bursitis of both knees    Umbilical hernia    Obesity (BMI 30-39.9)    Concussion with no loss of consciousness    Pulmonary embolism with infarction ([facility])    Interstitial pulmonary disease, unspecified ([facility])    Statin intolerance    Moderate aortic stenosis    Anxiety disorder, unspecified    Primary localized osteoarthritis of knees, bilateral    Contusion of buttock    SPENCER (obstructive sleep apnea)    Other bursitis of knee, unspecified knee    Difficulty in walking, not elsewhere classified    Generalized muscle weakness    History of falling    Morbid (severe) obesity due to excess calories ([facility])    Bipolar disorder, current episode manic severe with psychotic features ([facility])    Hyperosmolality and hypernatremia    Hypokalemia    Intervertebral disc disorders with radiculopathy, lumbar region    Low back pain, unspecified    Major depressive disorder, single episode, unspecified    Presence of left artificial knee joint    Contusion of left knee    History of arthroplasty of left knee    Right foot drop   Seizure history- Denies     Allergies:   Allergies   Allergen Reactions    Molds & Smuts Nasal Congestion    Bee Pollen Nasal Congestion     Other reaction(s): Nasal Congestion    Pollen Extract Nasal Congestion       Past Surgical History:   Past Surgical History:   Procedure  Laterality Date    BUNIONECTOMY      Left foot     CAST APPLICATION Right 2022    Procedure: Application short-arm thumb spica splint;  Surgeon: Lyndon Victoria MD;  Location: [facility];  Service: Orthopedics    COLON SURGERY      COLONOSCOPY  2018    COLONOSCOPY  2021    DILATION AND CURETTAGE OF UTERUS      INDUCED       surgically induced    KY ARTERIOVENOUS ANASTOMOSIS OPEN DIRECT Left 2019    Procedure: CREATION FISTULA ARTERIOVENOUS (AV) left wrists possible left upper;  Surgeon: Andrey Quintero MD;  Location: [facility];  Service: Vascular    KY ARTHRP KNE CONDYLE&PLATU MEDIAL&LAT COMPARTMENTS Left 2024    Procedure: ARTHROPLASTY KNEE TOTAL SAME DAY;  Surgeon: Riki Ma MD;  Location: [facility];  Service: Orthopedics    KY CORRJ HLX VLGS BNCTY SESMDC DSTL METAR OSTEOT Left 2019    Procedure: Serge bunionectomy;  Surgeon: Munir Larkin DPM;  Location: [facility];  Service: Podiatry    KY CORRJ HLX VLGS BNCTY SESMDC DSTL METAR OSTEOT Right 2020    Procedure: BUNIONECTOMY RAFAEL;  Surgeon: Munir Larkin DPM;  Location: [facility];  Service: Podiatry    KY CORRJ HLX VLGS BNCTY SESMDC PROX PHLX OSTEOT Right 2021    Procedure: BUNIONECTOMY TAMEKA, right tameka osteotomy and 2nd claw toe correction;  Surgeon: James R Lachman, MD;  Location: [facility];  Service: Orthopedics    KY ERCP DX COLLECTION SPECIMEN BRUSHING/WASHING N/A 2018    Procedure: ENDOSCOPIC RETROGRADE CHOLANGIOPANCREATOGRAPHY (ERCP);  Surgeon: Alfredo Messina MD;  Location: [facility];  Service: Gastroenterology    KY LAPAROSCOPY PROCTOPEXY PROLAPSE N/A 2018    Procedure: ROBOTIC SIGMOID RESECTION / RECTOPEXY;  Surgeon: ELPIDIO Mcnulty MD;  Location: [facility];  Service: Colorectal    KY OPEN TREATMENT RADIAL SHAFT FRACTURE W/INT FIXJ Right 10/11/2021    Procedure: OPEN REDUCTION W/ INTERNAL FIXATION (ORIF) RADIUS (WRIST), RIGHT DISTAL;  Surgeon: Riki Ma MD;   Location: [facility];  Service: Orthopedics    NM REMOVAL IMPLANT DEEP Right 06/23/2022    Procedure: Removal of hardware volar aspect right distal radius (distal radial plate and screws);  Surgeon: Lyndon Victoria MD;  Location: [facility];  Service: Orthopedics    NM SIGMOIDOSCOPY FLX DX W/COLLJ SPEC BR/WA IF PFRMD N/A 07/13/2018    Procedure: SIGMOIDOSCOPY FLEXIBLE;  Surgeon: ELPIDIO Mcnulty MD;  [facility];  Service: Colorectal    NM TR TDN RESTORE INTRNSC FUNCJ ALL 4 FNGRS Right 06/23/2022    Procedure: Right ring finger flexor digitorum superficialis to flexor pollicis longus tendon transfer;  Surgeon: Lyndon Victoria MD;  Location: [facility];  Service: Orthopedics    TUBAL LIGATION Bilateral 1997    US GUIDED THYROID BIOPSY  07/30/2019       Past Psychiatric History:   Past Inpatient Psychiatric Treatment:   [facility]- [facility]- Manic depressive- bipolar  [facility] a [facility] more times  [id number] didn't belong in there- School phobia  [facility]-15 years ago- Overdose on medications  Past Outpatient Psychiatric Treatment:    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  Dr Veronica Medina  Past Suicide Attempts: yes, by overdose on medications, 3 times overdosed- Last SA was approx 12 years ago  Past Violent Behavior: no  Past Psychiatric Medication Trials: Haldol- too tired, Cogentin- drowsy, Risperidone- unsure, Lithium- Kidney Disease, Abilify- unsure, Clozapine- long time ago,     Family Psychiatric History:   Mother- Depression- ECT in past  Sister- Depression    Social History:   Lives with-Sister- Radha    Kids-2- boys- grown- no relationship   Occupation- [employer]   Hobbies- None    Denies use of alcohol, nicotine, tobacco, caffeine,  or other illicit drugs.    Marijuana- long time ago- last smoked- 20 years ago    Traumatic History:    Denies history of physical, verbal, sexual, and emotional abuse.    The following portions of the patient's history were reviewed and  "updated as appropriate: allergies, current medications, past family history, past medical history, past social history, past surgical history, and problem list.    Objective:  There were no vitals filed for this visit.      Weight (last 2 days)       None            Labs:   No results found. However, due to the size of the patient record, not all encounters were searched. Please check Results Review for a complete set of results.    Mental status:  Appearance sitting comfortably in chair, cooperative with interview, good eye contact, unkempt   Mood \"Ok\"    Affect Appears generally euthymic, stable, mood-congruent   Speech Normal rate, rhythm, and volume   Thought Processes Linear and goal directed   Associations intact associations   Hallucinations Denies any auditory or visual hallucinations   Thought Content No passive or active suicidal or homicidal ideation, intent, or plan.   Orientation Oriented to person, place, time, and situation   Recent and Remote Memory Grossly intact   Attention Span and Concentration Concentration intact   Intellect Appears to be of Average Intelligence   Insight Insight intact   Judgement judgment was intact   Muscle Strength Muscle strength and tone were normal and Normal gait    Language Within normal limits   Fund of Knowledge Age appropriate   Pain None     PHQ-A Depression Screening                     Visit Time    Visit Start Time: 11:00 AM   Visit Stop Time: 11:19AM  Total Visit Duration:  19 minutes    This note may have been written with the assistance of dictation software. Please excuse any grammatical  errors, misspellings,  and abnormal spacing of letters, sentences or paragraphs .     EMERITA Lynn  01/16/25      "

## 2025-01-09 ENCOUNTER — OFFICE VISIT (OUTPATIENT)
Facility: CLINIC | Age: 65
End: 2025-01-09
Payer: MEDICARE

## 2025-01-09 DIAGNOSIS — S00.83XD CONTUSION OF FACE, SUBSEQUENT ENCOUNTER: ICD-10-CM

## 2025-01-09 DIAGNOSIS — S80.01XA CONTUSION OF RIGHT KNEE, INITIAL ENCOUNTER: ICD-10-CM

## 2025-01-09 DIAGNOSIS — Z09 ENCOUNTER FOR EXAMINATION FOLLOWING TREATMENT AT HOSPITAL: Primary | ICD-10-CM
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
## 2025-01-09 PROCEDURE — 97112 NEUROMUSCULAR REEDUCATION: CPT

## 2025-01-09 NOTE — PROGRESS NOTES
Daily Note/Progress note     Today's date: 2025  Patient name: Ayleen Moralez  : 1960  MRN: [id number]  Referring provider: Bette Harp DO  Dx:   Encounter Diagnosis     ICD-10-CM    1. Encounter for examination following treatment at hospital  Z09       2. Fall (on) (from) other stairs and steps, subsequent encounter  W10.8XXD       3. Contusion of face, subsequent encounter  S00.83XD       4. Contusion of right knee, initial encounter  S80.01XA               Start Time: 1415  Stop Time: 1445  Total time in clinic (min): 30 minutes    Subjective: Had a fall while carrying a bag on asphalt. She skinned her knee. Feels like when she slows down and is not rushing her balance is better.      Objective: See treatment diary below  Goals  In 4 weeks, patient will:  1.  Improve TUGC to within 20% of TUG to show improved dual task cost. - met  2.  Demonstrate independence with simple HEP - met    Assessment:  Shows improving dual task cost. When she slows down and controls her movements she has better balance, when rushing or looking to sit or get things down more instability is present. Poor single limb stance. When dual tasking and taking her time she has fair balance, rushing it becomes uncontrolled. Patient would benefit from continued PT      Plan: Continue per plan of care. LE strength. Reducing visual reliance. Minimize shuffling. Single limb stance.     Daily Treatment Diary     Precautions: FALLS,   CO-MORBIDITIES:  HEP ACCESS CODE:   FOTO Completed On:     POC Expires Reeval for Medicare to be completed  Unit Limit Auth Expiration Date PT/OT/STVisit Limit   2025 By visit 10 na 25 BOMN    Completed on visit 1                   Auth Status DATE 12/2 12/10 1/2 1/9     NA Visit # 1 2 3 4      Remaining         Testing         FGA          MiniBest          TUG/TUGC 9.50/11.45 (50>41)         Gait speed 1.00m/s                           THERAPEUTIC EXERCISE HEP         Mini squat      "     SLRx4                                                  NEUROMUSCULAR REEDUCATION      TUGC     Dynamic balance   HT gait 20ft x8 SOLO    Low hurdles fwd 10 step through, 6 bwd w water balance, 2x ea direction w water balance SOLO    4' step up tidal tank x4, 4lb unilat x12 SOLO SOLO tandem gait 20ft x6    SOLO low hurdles step through 6x fwd, 4x water carry step through    6' step runner step up max difficulty x10    Foam beam lat step 3x ea direction    Tidal tank bwd stepping 100ft x1    Ball toss fwd SOLO 20ft x8 Runner step up 6' x10 ea LE    SOLO low hurdles with card flip over uneven surface 8xfwd, 4x ea direction lateral    SOLO tidal tank carry uneven surface unilat 8x fwd, bwd 4x    Bwd stepping 80ft x1 SOLO     Static balance    Ball roll 30\" x3 ea LE SOLO SLS focus      Uneven surfaces   Fwd SOLO 10 laps       Obstacle course                                                                                                              THERAPEUTIC ACTIVITY                                                  GAIT TRAINING          Stairs                                          MODALITIES                                        "

## 2025-01-10 ENCOUNTER — OFFICE VISIT (OUTPATIENT)
Dept: FAMILY MEDICINE CLINIC | Facility: HOSPITAL | Age: 65
End: 2025-01-10
Payer: MEDICARE

## 2025-01-10 ENCOUNTER — TELEPHONE (OUTPATIENT)
Age: 65
End: 2025-01-10

## 2025-01-10 VITALS
DIASTOLIC BLOOD PRESSURE: 72 MMHG | SYSTOLIC BLOOD PRESSURE: 158 MMHG | RESPIRATION RATE: 16 BRPM | TEMPERATURE: 98.8 F | OXYGEN SATURATION: 97 % | HEIGHT: 67 IN | WEIGHT: 201 LBS | HEART RATE: 87 BPM | BODY MASS INDEX: 31.55 KG/M2

## 2025-01-10 DIAGNOSIS — N18.4 STAGE 4 CHRONIC KIDNEY DISEASE (HCC): ICD-10-CM

## 2025-01-10 DIAGNOSIS — M26.622 ARTHRALGIA OF LEFT TEMPOROMANDIBULAR JOINT: Primary | ICD-10-CM

## 2025-01-10 PROBLEM — N18.9 CHRONIC KIDNEY DISEASE, UNSPECIFIED: Status: RESOLVED | Noted: 2020-02-06 | Resolved: 2025-01-10

## 2025-01-10 PROCEDURE — 99213 OFFICE O/P EST LOW 20 MIN: CPT | Performed by: INTERNAL MEDICINE

## 2025-01-10 NOTE — ASSESSMENT & PLAN NOTE
Lab Results   Component Value Date    EGFR 20 11/08/2024    EGFR 19 06/25/2024    EGFR 23 05/28/2024    CREATININE 2.48 (H) 11/08/2024    CREATININE 2.58 (H) 06/25/2024    CREATININE 2.18 (H) 05/28/2024     Patient has stage IV CKD.  Her renal function was at baseline in November.  She will avoid NSAIDs

## 2025-01-10 NOTE — PROGRESS NOTES
"Name: Ayleen Moralez      : 1960      MRN: [id number]  Encounter Provider: Willam Lange MD  Encounter Date: 1/10/2025   Encounter department: [facility] PRIMARY C[facility] [address]  :  Assessment & Plan  Arthralgia of left temporomandibular joint    Patient presents with a 5-year history of left jaw area discomfort that radiates down towards the angle of the jaw.  It is worse when she bites down.  She denies left ear pain or decreased hearing.  Denies nasal congestion.  She has dentures.  The pain has been getting will be better.  She came today to make sure she has no left ear infection.    Examination of the left ear, left parotid gland, the mouth and cervical lymph nodes were normal.  She had tenderness on palpation of the left TMJ joint    Suspect she has left TMJ arthralgia.  I saw no otitis media. will try Tylenol as needed.  Stage 4 chronic kidney disease (HCC)  Lab Results   Component Value Date    EGFR 20 2024    EGFR 19 2024    EGFR 23 2024    CREATININE 2.48 (H) 2024    CREATININE 2.58 (H) 2024    CREATININE 2.18 (H) 2024     Patient has stage IV CKD.  Her renal function was at baseline in November.  She will avoid NSAIDs              History of Present Illness     HPI  Review of Systems    Objective   /72   Pulse 87   Temp 98.8 °F (37.1 °C) (Tympanic)   Resp 16   Ht 5' 7\" (1.702 m)   Wt 91.2 kg (201 lb)   LMP  (LMP Unknown)   SpO2 97%   BMI 31.48 kg/m²      Physical Exam    "

## 2025-01-10 NOTE — TELEPHONE ENCOUNTER
Patient was just seen at the office today, she called to see if anyone has turned in a wallet,  wallet, contains DL and Debit card. Please check and follow up with patient.

## 2025-01-14 ENCOUNTER — OFFICE VISIT (OUTPATIENT)
Facility: CLINIC | Age: 65
End: 2025-01-14
Payer: MEDICARE

## 2025-01-14 DIAGNOSIS — Z09 ENCOUNTER FOR EXAMINATION FOLLOWING TREATMENT AT HOSPITAL: Primary | ICD-10-CM

## 2025-01-14 DIAGNOSIS — S80.01XA CONTUSION OF RIGHT KNEE, INITIAL ENCOUNTER: ICD-10-CM
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
## 2025-01-14 DIAGNOSIS — S00.83XD CONTUSION OF FACE, SUBSEQUENT ENCOUNTER: ICD-10-CM

## 2025-01-14 PROCEDURE — 97112 NEUROMUSCULAR REEDUCATION: CPT

## 2025-01-14 NOTE — PROGRESS NOTES
Daily Note     Today's date: 2025  Patient name: Ayleen Moralez  : 1960  MRN: [id number]  Referring provider: Bette Harp DO  Dx:   Encounter Diagnosis     ICD-10-CM    1. Encounter for examination following treatment at hospital  Z       2. Fall (on) (from) other stairs and steps, subsequent encounter  W10.8XXD       3. Contusion of face, subsequent encounter  S00.83XD       4. Contusion of right knee, initial encounter  S80.01XA           Start Time: 1533          Subjective: Ayleen denies falls recently. Feels comfortable being the next visit being her last.       Objective: See treatment diary below      Assessment:  Pt continues to show difficulty with single limb stance. Shows better postural control with hurdles as well as navigating up foam wedge. Minimal errors while navigating hurdles, once unsuccessful becomes slightly imbalanced but recovers fairly. Discussed continuing to take her time as most of her falls involve her rushing, pt in agreement. Patient would benefit from continued PT      Plan: Continue per plan of care.      Daily Treatment Diary     Precautions: FALLS,   CO-MORBIDITIES:  HEP ACCESS CODE:   FOTO Completed On:     POC Expires Reeval for Medicare to be completed  Unit Limit Auth Expiration Date PT/OT/STVisit Limit   2025 By visit 10 na 25 BOMN    Completed on visit 1                   Auth Status DATE 12/2 12/10 1/2 1/9 1/14    NA Visit # 1 2 3 4 5     Remaining         Testing         FGA          MiniBest 21/28         TUG/TUGC 9.50/11.45 (50>41)         Gait speed 1.00m/s                           THERAPEUTIC EXERCISE HEP         Mini squat          SLRx4                                                  NEUROMUSCULAR REEDUCATION      TUGC     Dynamic balance   HT gait 20ft x8 SOLO    Low hurdles fwd 10 step through, 6 bwd w water balance, 2x ea direction w water balance SOLO    4' step up tidal tank x4, 4lb unilat x12 SOLO SOLO tandem gait 20ft  "x6    SOLO low hurdles step through 6x fwd, 4x water carry step through    6' step runner step up max difficulty x10    Foam beam lat step 3x ea direction    Tidal tank bwd stepping 100ft x1    Ball toss fwd SOLO 20ft x8 Runner step up 6' x10 ea LE    SOLO low hurdles with card flip over uneven surface 8xfwd, 4x ea direction lateral    SOLO tidal tank carry uneven surface unilat 8x fwd, bwd 4x    Bwd stepping 80ft x1 SOLO Low hurdles w tidal tank 8x fwd SOLO    SOLO 8lb med ball march 6x    SOLO foam wedge fwd step + bwd step 2x10    SOLO 6' step runner step up x12    Low hurdles fwd 2x, 4x ea direction lat SOLO    Static balance    Ball roll 30\" x3 ea LE SOLO SLS focus      Uneven surfaces   Fwd SOLO 10 laps       Obstacle course                                                                                                              THERAPEUTIC ACTIVITY                                                  GAIT TRAINING          Stairs                                          MODALITIES                                          "

## 2025-01-16 ENCOUNTER — OFFICE VISIT (OUTPATIENT)
Dept: PSYCHIATRY | Facility: CLINIC | Age: 65
End: 2025-01-16
Payer: MEDICARE

## 2025-01-16 ENCOUNTER — OFFICE VISIT (OUTPATIENT)
Facility: CLINIC | Age: 65
End: 2025-01-16
Payer: MEDICARE

## 2025-01-16 DIAGNOSIS — S80.01XA CONTUSION OF RIGHT KNEE, INITIAL ENCOUNTER: ICD-10-CM

## 2025-01-16 DIAGNOSIS — F31.30 BIPOLAR AFFECTIVE DISORDER, CURRENT EPISODE DEPRESSED, CURRENT EPISODE SEVERITY UNSPECIFIED (HCC): ICD-10-CM
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
## 2025-01-16 DIAGNOSIS — S00.83XD CONTUSION OF FACE, SUBSEQUENT ENCOUNTER: ICD-10-CM

## 2025-01-16 DIAGNOSIS — F31.10 BIPOLAR I DISORDER, MOST RECENT EPISODE MANIC (HCC): ICD-10-CM

## 2025-01-16 DIAGNOSIS — F42.9 OBSESSIVE-COMPULSIVE DISORDER, UNSPECIFIED TYPE: ICD-10-CM

## 2025-01-16 DIAGNOSIS — F31.2 BIPOLAR DISORDER, CURRENT EPISODE MANIC SEVERE WITH PSYCHOTIC FEATURES (HCC): Primary | ICD-10-CM

## 2025-01-16 DIAGNOSIS — Z79.899 ENCOUNTER FOR LONG-TERM (CURRENT) USE OF MEDICATIONS: ICD-10-CM

## 2025-01-16 DIAGNOSIS — Z09 ENCOUNTER FOR EXAMINATION FOLLOWING TREATMENT AT HOSPITAL: Primary | ICD-10-CM

## 2025-01-16 PROBLEM — F39 UNSPECIFIED MOOD (AFFECTIVE) DISORDER (HCC): Status: RESOLVED | Noted: 2023-11-14 | Resolved: 2025-01-16

## 2025-01-16 PROCEDURE — 99214 OFFICE O/P EST MOD 30 MIN: CPT

## 2025-01-16 PROCEDURE — 97112 NEUROMUSCULAR REEDUCATION: CPT

## 2025-01-16 RX ORDER — QUETIAPINE FUMARATE 100 MG/1
100 TABLET, FILM COATED ORAL DAILY
Qty: 90 TABLET | Refills: 3 | Status: SHIPPED | OUTPATIENT
Start: 2025-01-16

## 2025-01-16 RX ORDER — FLUVOXAMINE MALEATE 100 MG
TABLET ORAL
Qty: 270 TABLET | Refills: 2 | Status: SHIPPED | OUTPATIENT
Start: 2025-01-16

## 2025-01-16 RX ORDER — QUETIAPINE FUMARATE 300 MG/1
300 TABLET, FILM COATED ORAL DAILY
Qty: 90 TABLET | Refills: 3 | Status: SHIPPED | OUTPATIENT
Start: 2025-01-16

## 2025-01-16 RX ORDER — LAMOTRIGINE 200 MG/1
TABLET ORAL
Qty: 180 TABLET | Refills: 3 | Status: SHIPPED | OUTPATIENT
Start: 2025-01-16

## 2025-01-16 RX ORDER — QUETIAPINE FUMARATE 400 MG/1
400 TABLET, FILM COATED ORAL
Qty: 90 TABLET | Refills: 3 | Status: SHIPPED | OUTPATIENT
Start: 2025-01-16

## 2025-01-16 NOTE — PROGRESS NOTES
PT Discharge    Today's date: 2025  Patient name: Ayleen Moralez  : 1960  MRN: [id number]  Referring provider: Bette Harp DO  Dx:   Encounter Diagnosis     ICD-10-CM    1. Encounter for examination following treatment at hospital  [facility]       2. Fall (on) (from) other stairs and steps, subsequent encounter  W10.8XXD       3. Contusion of face, subsequent encounter  S00.83XD       4. Contusion of right knee, initial encounter  S80.01XA             Start Time: 1416  Stop Time: 1439  Total time in clinic (min): 23 minutes    Assessment    Assessment details: Patient is a 64 y.o. year old female presenting to OPPT s/p diagnosis of hx of falls and ambulatory dysfunction. Ayleen reports less falls since beginning PT, the biggest help is that she is trying to slow herself down.  She shows mild improvement in her balance outcome measures. Her gait speed remains similar to initial evaluation. She demonstrates improved stability with head movement and negotiating obstacles, when under control. She reports performing stairs with better stability and clearance of her foot, without falls recently. She has nearly met all her PT goals. Pt and PT mutually agree to discharge from skilled PT.    Goals  In 4 weeks, patient will:  1.  Improve TUGC to within 20% of TUG to show improved dual task cost. - met  2.  Demonstrate independence with simple HEP - met    In 4-10 weeks, patient will:  1.  Demonstrate consistent carryover with HEP and walking program. - met  2.  Improve gait speed by at least 0.12 m/s to meet MCID value for older adults and reduce fall risk. - not met  3.  Improve MiniBest by at least 4 points to show improved dynamic mobility and reduce fall risk. - nearly met  4.  Improve ABC to at least above 67% to show reduced risk of falling and improved balance confidence - met  5.  Improve FGA by at least 4 points to show improved dynamic balance while walking. - not met  6.  Return to second floor safely to  return to PLOF - not met            Subjective Evaluation    History of Present Illness  Mechanism of injury: : jones feels her balance is better and would be okay being done with PT.    Present at PT: had some falls, typically associated with stepping up. Last one was on . Reaching for something and knocked the tv on top of herself. Bruised on her butt. She feels she is dragging her feet. Says she has a drop foot diagnosed roughly 10 years ago (Right sided). Will shuffle her feet.    No longer living on second floor out of safety of doing stairs.    Goals: return to living on second floor, better balance  Patient Goals  Patient goals for therapy: increased strength and improved balance    Pain  Current pain ratin  At best pain ratin  At worst pain ratin  Location: L knee  Quality: dull ache and radiating    Social Support  Stairs in house: yes   Lives in: multiple-level home  Lives with: sister.    Employment status: not working    Diagnostic Tests  No diagnostic tests performed  Treatments  No previous or current treatments        Objective    Balance Test    ABC: 56% 74%   MiniBest:    FGA    Gait Speed (m/s): 1.00 1.00   5x Sit To Stand (s):     TUG/TUGC: 8.92 / 13.63 TUG/TUGC 9.50/11.45 (50>41)         MCTSIB Number of Seconds   Feet Together, Eyes Open 30   Feet Together, Eyes Closed 30   Feet Together, Eyes Open Foam 30   Feet Together, Eyes Closed Foam 30 moderate sway       Coordination Left Right   Heel To Shin Intact limited ROM Intact limited ROM   Finger To Nose intact intact   Rapid Alternating Movement     UE     LE         Sensation Left Right   Kinesthesia intact intact   Light Touch intact intact   Sharp/Dull     2 Point Discrimination         Gait Assessment: Minimal shuffling noted on todays visit. However, lacks arm swing at any speed. No issues with foot clearance.              Daily Treatment Diary     Precautions: FALLS,  "  CO-MORBIDITIES:  HEP ACCESS CODE:   FOTO Completed On:     POC Expires Reeval for Medicare to be completed  Unit Limit Auth Expiration Date PT/OT/STVisit Limit   1/31/2025 By visit 10 na 12/31/25 BOMN    Completed on visit 1                   Auth Status DATE 12/2 12/10 1/2 1/9 1/14 1/16   NA Visit # 1 2 3 4 5 6    Remaining         Testing         FGA 24/30         MiniBest 23/28         TUG/TUGC 9.50/11.45 (50>41)         Gait speed 1.00m/s                           THERAPEUTIC EXERCISE HEP         Mini squat          SLRx4                                                  NEUROMUSCULAR REEDUCATION      TUGC  MiniBest, 10mWT, FGA   Dynamic balance   HT gait 20ft x8 SOLO    Low hurdles fwd 10 step through, 6 bwd w water balance, 2x ea direction w water balance SOLO    4' step up tidal tank x4, 4lb unilat x12 SOLO SOLO tandem gait 20ft x6    SOLO low hurdles step through 6x fwd, 4x water carry step through    6' step runner step up max difficulty x10    Foam beam lat step 3x ea direction    Tidal tank bwd stepping 100ft x1    Ball toss fwd SOLO 20ft x8 Runner step up 6' x10 ea LE    SOLO low hurdles with card flip over uneven surface 8xfwd, 4x ea direction lateral    SOLO tidal tank carry uneven surface unilat 8x fwd, bwd 4x    Bwd stepping 80ft x1 SOLO Low hurdles w tidal tank 8x fwd SOLO    SOLO 8lb med ball march 6x    SOLO foam wedge fwd step + bwd step 2x10    SOLO 6' step runner step up x12    Low hurdles fwd 2x, 4x ea direction lat SOLO    Static balance    Ball roll 30\" x3 ea LE SOLO SLS focus      Uneven surfaces   Fwd SOLO 10 laps       Obstacle course                                                                                                              THERAPEUTIC ACTIVITY                                                  GAIT TRAINING          Stairs                                          MODALITIES                                           "

## 2025-01-16 NOTE — ASSESSMENT & PLAN NOTE
Orders:    fluvoxaMINE (LUVOX) 100 mg tablet; Fluvoxamine 100m tablet in the morning ; 2 tablets at night

## 2025-01-17 ENCOUNTER — OFFICE VISIT (OUTPATIENT)
Dept: OBGYN CLINIC | Facility: CLINIC | Age: 65
End: 2025-01-17
Payer: MEDICARE

## 2025-01-17 VITALS
BODY MASS INDEX: 32.87 KG/M2 | DIASTOLIC BLOOD PRESSURE: 72 MMHG | HEIGHT: 67 IN | WEIGHT: 209.4 LBS | SYSTOLIC BLOOD PRESSURE: 128 MMHG

## 2025-01-17 DIAGNOSIS — Z80.3 FAMILY HX-BREAST MALIGNANCY: ICD-10-CM

## 2025-01-17 DIAGNOSIS — Z01.419 ROUTINE GYNECOLOGICAL EXAMINATION: Primary | ICD-10-CM

## 2025-01-17 DIAGNOSIS — F11.20 OPIOID DEPENDENCE, UNCOMPLICATED (HCC): ICD-10-CM

## 2025-01-17 DIAGNOSIS — M46.1 BILATERAL SACROILIITIS (HCC): ICD-10-CM

## 2025-01-17 DIAGNOSIS — Z12.4 CERVICAL CANCER SCREENING: ICD-10-CM

## 2025-01-17 DIAGNOSIS — Z12.31 BREAST CANCER SCREENING BY MAMMOGRAM: ICD-10-CM

## 2025-01-17 PROCEDURE — G0476 HPV COMBO ASSAY CA SCREEN: HCPCS | Performed by: STUDENT IN AN ORGANIZED HEALTH CARE EDUCATION/TRAINING PROGRAM

## 2025-01-17 PROCEDURE — G0101 CA SCREEN;PELVIC/BREAST EXAM: HCPCS | Performed by: STUDENT IN AN ORGANIZED HEALTH CARE EDUCATION/TRAINING PROGRAM

## 2025-01-17 PROCEDURE — G0145 SCR C/V CYTO,THINLAYER,RESCR: HCPCS | Performed by: STUDENT IN AN ORGANIZED HEALTH CARE EDUCATION/TRAINING PROGRAM

## 2025-01-17 NOTE — PROGRESS NOTES
Annual Wellness Visit  [facility] [place]  [address] [place] [place] [address]    ASSESSMENT/PLAN: Ayleen Moralez is a 64 y.o.  who presents for annual gynecologic exam.  Assessment & Plan  Family hx-breast malignancy    Orders:    Ambulatory Referral to Oncology Genetics; Future    Cervical cancer screening    Orders:    Liquid-based pap, screening    Breast cancer screening by mammogram    Orders:    Mammo screening bilateral w 3d and cad; Future    Routine gynecological examination  Exam benign   Pap smear collected   Mammogram orders placed   Due for colonoscopy   RTC in 1 year or PRN for any acute concerns/complaints          Next visit: Annual gynecologic visit     CC:  Annual Gynecologic Examination    HPI: Ayleen Moralez is a 64 y.o.  who presents for annual gynecologic examination. PMH knee replacement, idiopathic chronic pancreatitis, anxiety/depression, arthritis, bipolar disorder, chronic back pain, chronic kidney disease, hx COVID, hx CVA, HTN, hx pericardial effusion, vitamin D deficiency, osteoporosis, obesity.     Menstrual hx   - Menopausal since 50's   - No VB   - No menopausal complaints at today's visit     Sexually active: No   Hx STD: Denies   STD testing: Declined as  she is not sexually active     Pap smear hx   - Last in , negative cytology   - Amenable to collection today     Mammogram hx   - Last , BI-RADS 1, repeat in 1 year  -  orders placed     Colonoscopy hx   - Last , recommended to repeat in 1 year due to poor prep  - Patient reports she was told to come back in 5 years   - Scheduled to see GI doctor in 2025, will inquire about colonoscopy then, does not want new referral at today's visit     Last DEXA scan in . Osteoporosis noted on left radius. She was started on Prolia and being managed by Endocrinology.     Breast complaints: Denies     Bowel/bladder complaints:   Sometimes has SOPHIE symptoms, not very bothersome   Denies  issues with bowel movements     Family hx:  History of breast cancer in Paternal Grandmother, Paternal Aunt, Maternal Aunt, Sister, Paternal Aunt   Not sure about genetic testing in family members   Amenable to speaking with genetic oncology     Gyn History  No LMP recorded (lmp unknown). Patient is postmenopausal.     Last Pap: 2021 was normal and patient does not recall results of last pap    She  reports that she is not currently sexually active and has had partner(s) who are male. She reports using the following method of birth control/protection: Post-menopausal.     OB History      Past Medical History:  02/15/2024: Aftercare following left knee joint replacement surgery  No date: Anxiety  No date: Anxiety disorder  No date: Arthritis  No date: At risk for falls  No date: Bipolar 2 disorder (HCC)  No date: Chronic back pain  No date: Chronic kidney disease  2020: Closed fracture of distal end of right fibula with   routine healing  No date: COVID-19      Comment:  in 2021  No date: CVA (cerebral vascular accident) ([facility])      Comment:  noted on MRI in the past  No date: Depression  No date: GERD (gastroesophageal reflux disease)  No date: Hypercholesteremia  No date: Hypernatremia  No date: Hypertension  No date: Hypokalemia  2018: Idiopathic chronic pancreatitis ([facility])  No date: Intervertebral disc disorder with radiculopathy of   lumbosacral region      Comment:  resolved: 2015  No date: Limb alert care status      Comment:  LUE-fistula  No date: Panic attacks  No date: Pericardial effusion  No date: PONV (postoperative nausea and vomiting)  No date: Psychiatric problem  No date: Radiculitis      Comment:  resolved: 2015  No date: Secondary renal hyperparathyroidism (HCC)  No date: Stroke (HCC)  No date: Vitamin D deficiency     Past Surgical History:  No date: BUNIONECTOMY      Comment:  Left foot   2022: CAST APPLICATION; Right      Comment:  Procedure:  Application short-arm thumb spica splint;                 Surgeon: Lyndon Victoria MD;  Location: [facility];                 Service: Orthopedics  No date: COLON SURGERY  2018: COLONOSCOPY  2021: COLONOSCOPY  No date: DILATION AND CURETTAGE OF UTERUS  No date: INDUCED       Comment:  surgically induced  2019: NH ARTERIOVENOUS ANASTOMOSIS OPEN DIRECT; Left      Comment:  Procedure: CREATION FISTULA ARTERIOVENOUS (AV) left                wrists possible left upper;  Surgeon: Andrey Quintero MD;                 Location: [facility];  Service: Vascular  2024: NH ARTHRP KNE CONDYLE&PLATU MEDIAL&LAT COMPARTMENTS; Left      Comment:  Procedure: ARTHROPLASTY KNEE TOTAL SAME DAY;  Surgeon:                Riki Ma MD;  Location: [facility];  Service:                Orthopedics  2019: NH CORRJ HLX VLGS BNCTY SESMDC DSTL METAR OSTEOT; Left      Comment:  Procedure: Serge bunionectomy;  Surgeon: Munir Larkin DPM;  [facility];  Service: Podiatry  2020: NH CORRJ HLX VLGS BNCTY SESMDC DSTL METAR OSTEOT; Right      Comment:  Procedure: BUNIONECTOMY RAFAEL;  Surgeon: Muinr Larkin DPM;  Location: [facility];  Service: Podiatry  2021: NH CORRJ HLX VLGS BNCTY SESMDC PROX PHLX OSTEOT; Right      Comment:  Procedure: BUNIONECTOMY TAMEKA, right tameka osteotomy and               2nd claw toe correction;  Surgeon: James R Lachman, MD;                 Location: [facility];  Service: Orthopedics  2018: NH ERCP DX COLLECTION SPECIMEN BRUSHING/WASHING; N/A      Comment:  Procedure: ENDOSCOPIC RETROGRADE                CHOLANGIOPANCREATOGRAPHY (ERCP);  Surgeon: Alfredo Messina MD;  [facility];  Service:                Gastroenterology  2018: NH LAPAROSCOPY PROCTOPEXY PROLAPSE; N/A      Comment:  Procedure: ROBOTIC SIGMOID RESECTION / RECTOPEXY;                 Surgeon: ELPIDIO Mcnulty MD;  Location: [facility];                 Service: Colorectal  10/11/2021: PA OPEN TREATMENT RADIAL SHAFT FRACTURE W/INT FIXJ; Right      Comment:  Procedure: OPEN REDUCTION W/ INTERNAL FIXATION (ORIF)                RADIUS (WRIST), RIGHT DISTAL;  Surgeon: Riki Ma MD;               Location: [facility];  Service: Orthopedics  06/23/2022: PA REMOVAL IMPLANT DEEP; Right      Comment:  Procedure: Removal of hardware volar aspect right distal               radius (distal radial plate and screws);  Surgeon:                Lyndon Victoria MD;  Location: [facility];  Service:                Orthopedics  07/13/2018: PA SIGMOIDOSCOPY FLX DX W/COLLJ SPEC BR/WA IF PFRMD; N/A      Comment:  Procedure: SIGMOIDOSCOPY FLEXIBLE;  Surgeon: ELPIDIO Mcnulty MD;  [facility];  Service: Colorectal  06/23/2022: PA TR TDN RESTORE INTRNSC FUNCJ ALL 4 FNGRS; Right      Comment:  Procedure: Right ring finger flexor digitorum                superficialis to flexor pollicis longus tendon transfer;                Surgeon: Lyndon Victoria MD;  Location: [facility];                 Service: Orthopedics  1997: TUBAL LIGATION; Bilateral  07/30/2019: US GUIDED THYROID BIOPSY     Family History   Problem Relation Age of Onset    Bipolar disorder Mother     Mental illness Mother         depression    Stroke Mother     Dementia Mother     Colon polyps Mother     Heart disease Father     Hypertension Father     Diabetes Father     Other Family         Back disorder    Diabetes Family     Heart disease Family     Hypertension Family     Stroke Family     Thyroid disease Family     Breast cancer Paternal Grandmother         age unknown    Breast cancer Paternal Aunt         age unknown    Breast cancer Maternal Aunt         age unknown    Mental illness Sister     Colon polyps Sister     Mental illness Sister     Heart disease Sister     No Known Problems Sister     Breast cancer Sister 68    Other Son         pituitary tumor     Hypertension Son     Obesity Son     No Known Problems Son     No Known Problems Maternal Grandmother     No Known Problems Maternal Grandfather     No Known Problems Paternal Grandfather     Breast cancer Paternal Aunt         age unknown    Substance Abuse Neg Hx         neg fam hx    Colon cancer Neg Hx       Social History     Tobacco Use    Smoking status: Never    Smokeless tobacco: Never   Vaping Use    Vaping status: Never Used   Substance Use Topics    Alcohol use: Not Currently    Drug use: Not Currently     Types: Hydrocodone, Marijuana     Comment: MEDICATION   Ayleen has h/o marijuana abuse- not currently        Current Outpatient Medications:     acetaminophen (TYLENOL) 500 mg tablet, Take 2 tablets (1,000 mg total) by mouth every 8 (eight) hours, Disp: 60 tablet, Rfl: 0    albuterol (Ventolin HFA) 90 mcg/act inhaler, Inhale 2 puffs every 6 (six) hours as needed for wheezing or shortness of breath, Disp: 8.5 g, Rfl: 3    AMILoride 5 mg tablet, TAKE 1 TABLET BY MOUTH TWICE  DAILY, Disp: 180 tablet, Rfl: 1    amLODIPine (NORVASC) 5 mg tablet, Take 1 tablet (5 mg total) by mouth daily, Disp: 90 tablet, Rfl: 1    ascorbic acid (VITAMIN C) 500 MG tablet, Take 1 tablet (500 mg total) by mouth 2 (two) times a day, Disp: 60 tablet, Rfl: 2    aspirin 81 mg chewable tablet, Chew 1 tablet (81 mg total) 2 (two) times a day, Disp: 60 tablet, Rfl: 0    budesonide-formoterol (Symbicort) 160-4.5 mcg/act inhaler, Inhale 2 puffs 2 (two) times a day Rinse mouth after use., Disp: 10.2 g, Rfl: 11    calcium carbonate (Calcium 600) 600 MG tablet, Take 600 mg by mouth 2 (two) times a day with meals, Disp: , Rfl:     carvedilol (COREG) 25 mg tablet, TAKE 1 TABLET BY MOUTH TWICE  DAILY WITH MEALS, Disp: 180 tablet, Rfl: 1    cholecalciferol (VITAMIN D3) 1,000 units tablet, Take 5 tablets (5,000 Units total) by mouth daily, Disp: 90 tablet, Rfl: 5    clonazePAM (KlonoPIN) 0.5 mg tablet, Take 1 tablet (0.5 mg total) by mouth 3  (three) times a day Do not start before 2025., Disp: 270 tablet, Rfl: 0    Cyanocobalamin (VITAMIN B 12 PO), Take 1,000 mcg by mouth in the morning, Disp: , Rfl:     ferrous sulfate 324 (65 Fe) mg, Take 1 tablet (324 mg total) by mouth 2 (two) times a day before meals, Disp: 60 tablet, Rfl: 2    fluvoxaMINE (LUVOX) 100 mg tablet, Fluvoxamine 100m tablet in the morning ; 2 tablets at night, Disp: 270 tablet, Rfl: 2    lamoTRIgine (LaMICtal) 200 MG tablet, Lamotrigine 200m tablet in the morning , 1 tablet at night, Disp: 180 tablet, Rfl: 3    montelukast (SINGULAIR) 10 mg tablet, Take 1 tablet (10 mg total) by mouth daily at bedtime, Disp: 30 tablet, Rfl: 5    Multiple Vitamin (MULTI-VITAMIN) tablet, Take 1 tablet by mouth daily, Disp: 30 tablet, Rfl: 2    ondansetron (ZOFRAN) 4 mg tablet, Take 1 tablet (4 mg total) by mouth every 8 (eight) hours as needed for nausea or vomiting, Disp: 30 tablet, Rfl: 1    QUEtiapine (SEROquel) 100 mg tablet, Take 1 tablet (100 mg total) by mouth in the morning, Disp: 90 tablet, Rfl: 3    QUEtiapine (SEROquel) 300 mg tablet, Take 1 tablet (300 mg total) by mouth in the morning, Disp: 90 tablet, Rfl: 3    QUEtiapine (SEROquel) 400 MG tablet, Take 1 tablet (400 mg total) by mouth daily at bedtime Take with the Seroquel 100 mg to total 500 mg daily at bedtime, Disp: 90 tablet, Rfl: 3    rosuvastatin (CRESTOR) 20 MG tablet, TAKE 1 TABLET BY MOUTH IN THE  EVENING, Disp: 90 tablet, Rfl: 3    traMADol (ULTRAM) 50 mg tablet, Take 1 tablet (50 mg total) by mouth 2 (two) times a day as needed for moderate pain, Disp: 60 tablet, Rfl: 0    Diclofenac Sodium (VOLTAREN) 1 %, Apply 2 g topically 4 (four) times a day for 7 days, Disp: 56 g, Rfl: 0    omeprazole (PriLOSEC) 40 MG capsule, TAKE 1 CAPSULE BY MOUTH DAILY  BEFORE BREAKFAST, Disp: 90 capsule, Rfl: 3    She is allergic to molds & smuts, bee pollen, and pollen extract..    ROS negative except as noted in  "HPI    Objective:  /72 (BP Location: Right arm, Patient Position: Sitting, Cuff Size: Standard)   Ht 5' 7\" (1.702 m)   Wt 95 kg (209 lb 6.4 oz)   LMP  (LMP Unknown)   Breastfeeding No   BMI 32.80 kg/m²     Chaperone used for today's exam      Physical Exam  Vitals reviewed. Exam conducted with a chaperone present.   Constitutional:       Appearance: Normal appearance.   HENT:      Head: Atraumatic.   Eyes:      Extraocular Movements: Extraocular movements intact.   Cardiovascular:      Rate and Rhythm: Normal rate and regular rhythm.   Pulmonary:      Effort: Pulmonary effort is normal.      Comments: Breast Exam: No dimpling, nipple retraction or discharge. No lumps or masses. No axillary or supraclavicular nodes.  Abdominal:      General: There is no distension.      Palpations: Abdomen is soft.      Tenderness: There is no abdominal tenderness. There is no guarding or rebound.   Genitourinary:     Comments: External genitalia: Normal appearance, no abnormal pigmentation, no lesions or masses. Urinary system: Urethral meatus normal, bladder non-tender.  Vaginal: Mild atrophy noted, normal mucosa without prolapse or lesions. Normal-appearing physiologic discharge  Cervix: Normal-appearing, well-epithelialized, no gross lesions or masses. No cervical motion tenderness.  Adnexa: No adnexal masses or tenderness noted.  Uterus: Normal-sized, regular contour, midline, mobile, no uterine tenderness       Musculoskeletal:         General: Normal range of motion.      Cervical back: Normal range of motion.   Skin:     General: Skin is warm and dry.   Neurological:      General: No focal deficit present.      Mental Status: She is alert and oriented to person, place, and time.   Psychiatric:         Mood and Affect: Mood normal.         Behavior: Behavior normal.          "

## 2025-01-19 ENCOUNTER — TELEPHONE (OUTPATIENT)
Dept: BEHAVIORAL/MENTAL HEALTH CLINIC | Facility: CLINIC | Age: 65
End: 2025-01-19

## 2025-01-19 NOTE — TELEPHONE ENCOUNTER
called ct. Someone else answered, sister Radha? Advised I would sent clt an email about our meeting tomorrow. Only left my name. Emailed client - MALLORY Abbasi, due to weather,  I am working from home tomorrow.  I am changing our12 pm appt to a virtual appt.  If that is not okay.  Please let me know and we can reschedule.  I will send you a link at the time of our appointment via text and email.      I'm sorry for any inconvenience.  Also when you get this, please let me know.   Further, please clarify which email you prefer for us to use, we have 2.  Thank you.  Ksenia.

## 2025-01-20 ENCOUNTER — NURSE TRIAGE (OUTPATIENT)
Age: 65
End: 2025-01-20

## 2025-01-20 LAB
HPV HR 12 DNA CVX QL NAA+PROBE: NEGATIVE
HPV16 DNA CVX QL NAA+PROBE: NEGATIVE
HPV18 DNA CVX QL NAA+PROBE: NEGATIVE

## 2025-01-20 NOTE — TELEPHONE ENCOUNTER
"Spoke with patient who reports she started with pink vaginal spotting when wiping this morning. She reports PAP on 1/17 but did not have any bleeding until now. She reports mild cramping and lower back pain.  She is on 81mg ASA.    Will discuss with provider and call pt back.  Pt agreeable to plan.      Reason for Disposition   Postmenopausal vaginal bleeding    Answer Assessment - Initial Assessment Questions  1. BLEEDING SEVERITY: \"Describe the bleeding that you are having.\" \"How much bleeding is there?\"       Spotting when wiping  2. ONSET: \"When did the bleeding begin?\" \"Is it continuing now?\"      Today   3. MENOPAUSE: \"When was your last menstrual period?\"       Decades ago.  4. ABDOMEN PAIN: \"Do you have any pain?\" \"How bad is the pain?\"  (e.g., Scale 0-10; none, mild, moderate, or severe)      Cramping mild  5. BLOOD THINNERS: \"Do you take any blood thinners?\" (e.g., Coumadin/warfarin, Pradaxa/dabigatran, aspirin)      81mg ASA  6. HORMONE MEDICINES: \"Are you taking any hormone medicines, prescription or OTC?\" (e.g., birth control pills, estrogen)      denies  7. CAUSE: \"What do you think is causing the bleeding?\" (e.g., recent gyn surgery, recent gyn procedure; known bleeding disorder, uterine cancer)        Did have PAP on 1/17  8. HEMODYNAMIC STATUS: \"Are you weak or feeling lightheaded?\" If Yes, ask: \"Can you stand and walk normally?\"        denies  9. OTHER SYMPTOMS: \"What other symptoms are you having with the bleeding?\" (e.g., back pain, burning with urination, fever)      Lower back pain    Protocols used: Vaginal Bleeding - Postmenopausal-Adult-[place]    "

## 2025-01-20 NOTE — TELEPHONE ENCOUNTER
Return call to patient to discuss provider note. Pt verbalized understanding, no further questions or concerns at this time.

## 2025-01-21 ENCOUNTER — RESULTS FOLLOW-UP (OUTPATIENT)
Dept: LABOR AND DELIVERY | Facility: HOSPITAL | Age: 65
End: 2025-01-21

## 2025-01-21 ENCOUNTER — APPOINTMENT (OUTPATIENT)
Dept: LAB | Facility: HOSPITAL | Age: 65
End: 2025-01-21
Payer: MEDICARE

## 2025-01-21 DIAGNOSIS — N18.4 CKD (CHRONIC KIDNEY DISEASE) STAGE 4, GFR 15-29 ML/MIN (HCC): ICD-10-CM

## 2025-01-21 DIAGNOSIS — Z79.899 ENCOUNTER FOR LONG-TERM (CURRENT) USE OF MEDICATIONS: ICD-10-CM

## 2025-01-21 LAB
ALBUMIN SERPL BCG-MCNC: 4 G/DL (ref 3.5–5)
ALP SERPL-CCNC: 114 U/L (ref 34–104)
ALT SERPL W P-5'-P-CCNC: 20 U/L (ref 7–52)
ANION GAP SERPL CALCULATED.3IONS-SCNC: 7 MMOL/L (ref 4–13)
AST SERPL W P-5'-P-CCNC: 21 U/L (ref 13–39)
BILIRUB SERPL-MCNC: 0.27 MG/DL (ref 0.2–1)
BUN SERPL-MCNC: 34 MG/DL (ref 5–25)
CALCIUM SERPL-MCNC: 9.2 MG/DL (ref 8.4–10.2)
CHLORIDE SERPL-SCNC: 106 MMOL/L (ref 96–108)
CHOLEST SERPL-MCNC: 213 MG/DL (ref ?–200)
CO2 SERPL-SCNC: 27 MMOL/L (ref 21–32)
CREAT SERPL-MCNC: 2.63 MG/DL (ref 0.6–1.3)
ERYTHROCYTE [DISTWIDTH] IN BLOOD BY AUTOMATED COUNT: 12.4 % (ref 11.6–15.1)
GFR SERPL CREATININE-BSD FRML MDRD: 18 ML/MIN/1.73SQ M
GLUCOSE P FAST SERPL-MCNC: 104 MG/DL (ref 65–99)
HCT VFR BLD AUTO: 35.7 % (ref 34.8–46.1)
HDLC SERPL-MCNC: 46 MG/DL
HGB BLD-MCNC: 10.9 G/DL (ref 11.5–15.4)
LDLC SERPL CALC-MCNC: 129 MG/DL (ref 0–100)
MAGNESIUM SERPL-MCNC: 2 MG/DL (ref 1.9–2.7)
MCH RBC QN AUTO: 32.2 PG (ref 26.8–34.3)
MCHC RBC AUTO-ENTMCNC: 30.5 G/DL (ref 31.4–37.4)
MCV RBC AUTO: 105 FL (ref 82–98)
NONHDLC SERPL-MCNC: 167 MG/DL
PHOSPHATE SERPL-MCNC: 5.4 MG/DL (ref 2.3–4.1)
PLATELET # BLD AUTO: 237 THOUSANDS/UL (ref 149–390)
PMV BLD AUTO: 10 FL (ref 8.9–12.7)
POTASSIUM SERPL-SCNC: 4.8 MMOL/L (ref 3.5–5.3)
PROT SERPL-MCNC: 6.8 G/DL (ref 6.4–8.4)
PTH-INTACT SERPL-MCNC: 125.9 PG/ML (ref 12–88)
RBC # BLD AUTO: 3.39 MILLION/UL (ref 3.81–5.12)
SODIUM SERPL-SCNC: 140 MMOL/L (ref 135–147)
TRIGL SERPL-MCNC: 188 MG/DL (ref ?–150)
URATE SERPL-MCNC: 6 MG/DL (ref 2–7.5)
WBC # BLD AUTO: 7.18 THOUSAND/UL (ref 4.31–10.16)

## 2025-01-21 PROCEDURE — 84550 ASSAY OF BLOOD/URIC ACID: CPT

## 2025-01-21 PROCEDURE — 83970 ASSAY OF PARATHORMONE: CPT

## 2025-01-21 PROCEDURE — 83735 ASSAY OF MAGNESIUM: CPT

## 2025-01-21 PROCEDURE — 85027 COMPLETE CBC AUTOMATED: CPT

## 2025-01-21 PROCEDURE — 36415 COLL VENOUS BLD VENIPUNCTURE: CPT

## 2025-01-21 PROCEDURE — 84100 ASSAY OF PHOSPHORUS: CPT

## 2025-01-21 PROCEDURE — 80061 LIPID PANEL: CPT

## 2025-01-21 PROCEDURE — 80053 COMPREHEN METABOLIC PANEL: CPT

## 2025-01-23 ENCOUNTER — HOSPITAL ENCOUNTER (OUTPATIENT)
Dept: NUCLEAR MEDICINE | Facility: HOSPITAL | Age: 65
End: 2025-01-23
Attending: INTERNAL MEDICINE
Payer: MEDICARE

## 2025-01-23 ENCOUNTER — APPOINTMENT (OUTPATIENT)
Dept: LAB | Facility: HOSPITAL | Age: 65
End: 2025-01-23
Payer: MEDICARE

## 2025-01-23 ENCOUNTER — HOSPITAL ENCOUNTER (OUTPATIENT)
Dept: NON INVASIVE DIAGNOSTICS | Facility: HOSPITAL | Age: 65
End: 2025-01-23
Attending: INTERNAL MEDICINE
Payer: MEDICARE

## 2025-01-23 ENCOUNTER — HOSPITAL ENCOUNTER (OUTPATIENT)
Dept: CT IMAGING | Facility: HOSPITAL | Age: 65
Discharge: HOME | End: 2025-01-23
Attending: INTERNAL MEDICINE
Payer: MEDICARE

## 2025-01-23 DIAGNOSIS — E66.01 MORBID OBESITY (HCC): ICD-10-CM

## 2025-01-23 DIAGNOSIS — R01.1 CARDIAC MURMUR: ICD-10-CM

## 2025-01-23 DIAGNOSIS — E21.3 HYPERPARATHYROIDISM, UNSPECIFIED (HCC): ICD-10-CM

## 2025-01-23 DIAGNOSIS — I10 ESSENTIAL HYPERTENSION, MALIGNANT: ICD-10-CM

## 2025-01-23 DIAGNOSIS — N18.6 END STAGE RENAL DISEASE (HCC): ICD-10-CM

## 2025-01-23 DIAGNOSIS — N18.4 CHRONIC KIDNEY DISEASE, STAGE IV (SEVERE) (HCC): ICD-10-CM

## 2025-01-23 DIAGNOSIS — N25.81 SECONDARY HYPERPARATHYROIDISM OF RENAL ORIGIN (HCC): ICD-10-CM

## 2025-01-23 DIAGNOSIS — R79.89 ELEVATED LFTS: ICD-10-CM

## 2025-01-23 LAB
CREAT UR-MCNC: 28.4 MG/DL
MICROALBUMIN UR-MCNC: 18 MG/L
MICROALBUMIN/CREAT 24H UR: 63 MG/G CREATININE (ref 0–30)

## 2025-01-23 PROCEDURE — 74176 CT ABD & PELVIS W/O CONTRAST: CPT

## 2025-01-23 PROCEDURE — 82570 ASSAY OF URINE CREATININE: CPT

## 2025-01-23 PROCEDURE — 82043 UR ALBUMIN QUANTITATIVE: CPT

## 2025-01-24 ENCOUNTER — HOSPITAL ENCOUNTER (OUTPATIENT)
Dept: MAMMOGRAPHY | Facility: IMAGING CENTER | Age: 65
Discharge: HOME | End: 2025-01-24
Payer: MEDICARE

## 2025-01-24 VITALS — HEIGHT: 67 IN | WEIGHT: 209 LBS | BODY MASS INDEX: 32.8 KG/M2

## 2025-01-24 DIAGNOSIS — Z12.31 BREAST CANCER SCREENING BY MAMMOGRAM: ICD-10-CM

## 2025-01-24 LAB
LAB AP GYN PRIMARY INTERPRETATION: NORMAL
Lab: NORMAL

## 2025-01-24 PROCEDURE — 77063 BREAST TOMOSYNTHESIS BI: CPT

## 2025-01-24 PROCEDURE — 77067 SCR MAMMO BI INCL CAD: CPT

## 2025-01-30 ENCOUNTER — OFFICE VISIT (OUTPATIENT)
Dept: GASTROENTEROLOGY | Facility: CLINIC | Age: 65
End: 2025-01-30
Payer: MEDICARE

## 2025-01-30 ENCOUNTER — TELEPHONE (OUTPATIENT)
Dept: GASTROENTEROLOGY | Facility: CLINIC | Age: 65
End: 2025-01-30

## 2025-01-30 VITALS
BODY MASS INDEX: 32.02 KG/M2 | SYSTOLIC BLOOD PRESSURE: 130 MMHG | WEIGHT: 204 LBS | DIASTOLIC BLOOD PRESSURE: 80 MMHG | HEIGHT: 67 IN

## 2025-01-30 DIAGNOSIS — E66.01 MORBID OBESITY (HCC): ICD-10-CM

## 2025-01-30 DIAGNOSIS — K86.1 IDIOPATHIC CHRONIC PANCREATITIS (HCC): ICD-10-CM

## 2025-01-30 DIAGNOSIS — N18.6 END STAGE RENAL DISEASE (HCC): ICD-10-CM

## 2025-01-30 DIAGNOSIS — Z12.11 COLON CANCER SCREENING: ICD-10-CM

## 2025-01-30 DIAGNOSIS — N18.4 CHRONIC KIDNEY DISEASE, STAGE IV (SEVERE) (HCC): ICD-10-CM

## 2025-01-30 DIAGNOSIS — I10 ESSENTIAL HYPERTENSION, MALIGNANT: ICD-10-CM

## 2025-01-30 DIAGNOSIS — N25.81 SECONDARY HYPERPARATHYROIDISM OF RENAL ORIGIN (HCC): ICD-10-CM

## 2025-01-30 DIAGNOSIS — R74.01 ELEVATION OF LEVELS OF LIVER TRANSAMINASE LEVELS: ICD-10-CM

## 2025-01-30 DIAGNOSIS — R79.89 ELEVATED LFTS: Primary | ICD-10-CM

## 2025-01-30 PROCEDURE — 99214 OFFICE O/P EST MOD 30 MIN: CPT | Performed by: PHYSICIAN ASSISTANT

## 2025-01-30 PROCEDURE — G2211 COMPLEX E/M VISIT ADD ON: HCPCS | Performed by: PHYSICIAN ASSISTANT

## 2025-01-30 RX ORDER — POLYETHYLENE GLYCOL 3350 17 G/17G
238 POWDER, FOR SOLUTION ORAL ONCE
Qty: 238 G | Refills: 0 | Status: SHIPPED | OUTPATIENT
Start: 2025-01-30 | End: 2025-01-30

## 2025-01-30 RX ORDER — BISACODYL 5 MG/1
TABLET, DELAYED RELEASE ORAL
Qty: 4 TABLET | Refills: 0 | Status: SHIPPED | OUTPATIENT
Start: 2025-01-30

## 2025-01-30 RX ORDER — SODIUM CHLORIDE, SODIUM LACTATE, POTASSIUM CHLORIDE, CALCIUM CHLORIDE 600; 310; 30; 20 MG/100ML; MG/100ML; MG/100ML; MG/100ML
125 INJECTION, SOLUTION INTRAVENOUS CONTINUOUS
Status: CANCELLED | OUTPATIENT
Start: 2025-01-30

## 2025-01-30 NOTE — PROGRESS NOTES
Name: Ayleen Moralez      : 1960      MRN: [id number]  Encounter Provider: Kaylyn Navarro PA-C  Encounter Date: 2025   Encounter department: [facility]    :  Assessment & Plan  Elevated LFTs  Elevated alk phos, normal Tbili, AST, ALT. Favor likely renal etiology in the setting of CKD4 vs hepatic origin  Check GGT and RUQ U/S  Trend labs  Discussed plan with Dr. Rodriguez today  Orders:    Ambulatory Referral to Gastroenterology    Gamma GT; Future    US right upper quadrant; Future    Elevation of levels of liver transaminase levels  Orders:    Gamma GT; Future    End stage renal disease (HCC)  Lab Results   Component Value Date    EGFR 18 2025    EGFR 20 2024    EGFR 19 2024    CREATININE 2.63 (H) 2025    CREATININE 2.48 (H) 2024    CREATININE 2.58 (H) 2024     Plan for renal transplant evaluation, followed by nephrology  Orders:    Ambulatory Referral to Gastroenterology    Chronic kidney disease, stage IV (severe) (HCC)  Lab Results   Component Value Date    EGFR 18 2025    EGFR 20 2024    EGFR 19 2024    CREATININE 2.63 (H) 2025    CREATININE 2.48 (H) 2024    CREATININE 2.58 (H) 2024     As noted above  Orders:    Ambulatory Referral to Gastroenterology    Secondary hyperparathyroidism of renal origin (HCC)  Orders:    Ambulatory Referral to Gastroenterology    Essential hypertension, malignant  Orders:    Ambulatory Referral to Gastroenterology    Morbid obesity (HCC)  Patient counseling on weight management given.  Orders:    Ambulatory Referral to Gastroenterology    Colon cancer screening  Last colonoscopy: 3/22/2021  recall: 3 yr(s)  overdue: 3/2024  Patient agreeable to schedule screening colonoscopy  Miralax/Dulcolax prep; Golytely not covered by insurance carrier and magnesium-based preps contraindicated in the setting of CKD stage 4  Orders:    Colonoscopy; Future     "polyethylene glycol (GLYCOLAX) 17 GM/SCOOP powder; Take 238 g by mouth once for 1 dose Use as directed    bisacodyl (DULCOLAX) 5 mg EC tablet; Take 2 tablets by mouth at 4pm and take 2 tablets by mouth at 8pm the day before your scheduled colonoscopy. Do not crush or chew. Do not take within 1 hour of milk, any dairy product, or antacid.    Idiopathic chronic pancreatitis (HCC)  Stable at baseline         Return for colonoscopy.      History of Present Illness     Chief Complaint   Patient presents with    Colonoscopy     Patient on Kidney transplant list       Accompanied by self and history is obtained from self.    Patient is a 64 y.o. female with a significant PMH of GERD, drug-induced constipation, ESRD, secondary hyperparathyroidism, HTN, HLD, obesity, chronic pancreatitis, hx/o PE presenting for follow up regarding colon cancer screening.    Needs colonoscopy before kidney transplant  Currently scheduled for EGD with Dr. Messina on 2/3  No current GI complaints    Workup to date:   EGD:   Colonoscopy: 3/22/2021, 3 yr recall: \"IMPRESSION:   We removed 2 polyps\"  Recent GI imaging: non-contrast CT A/P 1/23/2025: \"IMPRESSION:  1.  No obstructive uropathy. Question a mildly complex cyst in the lower pole of the right kidney with small amount of calcification.  2.  Postsurgical changes of the bowel without obstruction.  3.  Stable left adrenal nodule.\"    Review of Systems   Constitutional:  Negative for appetite change, chills, fever and unexpected weight change.   HENT:  Negative for sore throat, trouble swallowing and voice change.    Respiratory:  Negative for cough and choking.    Cardiovascular:  Negative for chest pain and leg swelling.   Gastrointestinal:  Negative for abdominal distention, abdominal pain, anal bleeding, blood in stool, constipation, diarrhea, nausea, rectal pain and vomiting.   Skin:  Negative for pallor and rash.   Neurological:  Negative for weakness, light-headedness and headaches. " "  Psychiatric/Behavioral:  Negative for confusion and sleep disturbance. The patient is not nervous/anxious.        Objective   /80   Ht 5' 7\" (1.702 m)   Wt 92.5 kg (204 lb)   LMP  (LMP Unknown)   BMI 31.95 kg/m²     Blood pressure 130/80, height 5' 7\" (1.702 m), weight 92.5 kg (204 lb), not currently breastfeeding. Body mass index is 31.95 kg/m².    Physical Exam  Vitals and nursing note reviewed.   Constitutional:       General: She is not in acute distress.     Appearance: She is not toxic-appearing.   HENT:      Head: Normocephalic.      Mouth/Throat:      Mouth: Mucous membranes are moist.      Pharynx: Oropharynx is clear.   Eyes:      General: No scleral icterus.     Extraocular Movements: Extraocular movements intact.   Neck:      Trachea: Phonation normal.   Cardiovascular:      Comments: Appears well perfused  Pulmonary:      Effort: Pulmonary effort is normal. No respiratory distress.   Musculoskeletal:      Cervical back: Neck supple.      Comments: Moving all 4 extremities spontaneously   Skin:     General: Skin is warm and dry.      Capillary Refill: Capillary refill takes less than 2 seconds.      Coloration: Skin is not jaundiced or pale.   Neurological:      General: No focal deficit present.      Mental Status: She is alert and oriented to person, place, and time.   Psychiatric:         Behavior: Behavior normal. Behavior is cooperative.         Thought Content: Thought content normal.         I have spent a total time of 30 minutes on 01/30/25 in caring for this patient including Diagnostic results, Prognosis, Risks and benefits of tx options, Instructions for management, Patient and family education, Importance of tx compliance, Risk factor reductions, Impressions, Counseling / Coordination of care, Documenting in the medical record, Reviewing / ordering tests, medicine, procedures  , and Obtaining or reviewing history  .    Patient expressed understanding and had all questions and " concerns addressed.    Kaylyn Navarro PA-C  01/30/25  12:23 PM    This chart was completed in part utilizing [employer] speech voice recognition software. Random word insertions, pronoun errors, and incomplete sentences are an occasional consequence of this system due to software limitations, and ambient noise. Any questions or concerns about the content, text, or information contained within the body of this dictation should be directly addressed to the provider for clarification.

## 2025-01-30 NOTE — TELEPHONE ENCOUNTER
Scheduled date of combo (as of today):02/03/25  Physician performing combo:Mayuri  Location of combo:[facility]  Bowel prep reviewed with patient:Miralax  Instructions reviewed with patient by:verbal and folder given - DS  Clearances: NONE

## 2025-01-30 NOTE — ASSESSMENT & PLAN NOTE
Elevated alk phos, normal Tbili, AST, ALT. Favor likely renal etiology in the setting of CKD4 vs hepatic origin  Check GGT and RUQ U/S  Trend labs  Discussed plan with Dr. Rodriguez today  Orders:    Ambulatory Referral to Gastroenterology    Gamma GT; Future    US right upper quadrant; Future

## 2025-01-31 ENCOUNTER — TELEPHONE (OUTPATIENT)
Age: 65
End: 2025-01-31

## 2025-01-31 ENCOUNTER — OFFICE VISIT (OUTPATIENT)
Dept: NEPHROLOGY | Facility: HOSPITAL | Age: 65
End: 2025-01-31
Payer: MEDICARE

## 2025-01-31 VITALS
DIASTOLIC BLOOD PRESSURE: 60 MMHG | WEIGHT: 206 LBS | HEIGHT: 67 IN | SYSTOLIC BLOOD PRESSURE: 106 MMHG | BODY MASS INDEX: 32.33 KG/M2

## 2025-01-31 DIAGNOSIS — E21.3 HYPERPARATHYROIDISM, UNSPECIFIED (HCC): ICD-10-CM

## 2025-01-31 DIAGNOSIS — N18.4 STAGE 4 CHRONIC KIDNEY DISEASE (HCC): Primary | ICD-10-CM

## 2025-01-31 DIAGNOSIS — N25.81 SECONDARY HYPERPARATHYROIDISM OF RENAL ORIGIN (HCC): ICD-10-CM

## 2025-01-31 DIAGNOSIS — E83.39 HYPERPHOSPHATEMIA: ICD-10-CM

## 2025-01-31 DIAGNOSIS — I10 ESSENTIAL (PRIMARY) HYPERTENSION: ICD-10-CM

## 2025-01-31 DIAGNOSIS — F31.2 BIPOLAR DISORDER, CURRENT EPISODE MANIC SEVERE WITH PSYCHOTIC FEATURES (HCC): ICD-10-CM

## 2025-01-31 DIAGNOSIS — E87.6 HYPOKALEMIA: ICD-10-CM

## 2025-01-31 PROCEDURE — 99214 OFFICE O/P EST MOD 30 MIN: CPT | Performed by: INTERNAL MEDICINE

## 2025-01-31 NOTE — PROGRESS NOTES
Name: Ayleen Moralez      : 1960      MRN: [id number]  Encounter Provider: Arturo Mills DO  Encounter Date: 2025   [facility]  :  Assessment & Plan  Stage 4 chronic kidney disease (HCC)  Renal function has remained stable her creatinine has remained between 2-2.5, estimated GFR is been 18 to 20 mL/min  She has a left radiocephalic fistula in the event that she needs dialysis  Her blood pressures are at goal  Acid-base status is acceptable  Continue current medications  She is being worked up for renal transplant and preemptive listing    Orders:    Comprehensive metabolic panel; Future    CBC and Platelet; Future    Albumin / creatinine urine ratio; Future    Urinalysis with microscopic; Future    Phosphorus; Future    Uric acid; Future    Magnesium; Future    PTH, intact; Future    Bipolar disorder, current episode manic severe with psychotic features (HCC)  Overall stable, following up with psychiatry, she was asking about weight loss with Wegovy and I told her to discuss this with her psychiatrist before seeing nutritionist       Essential (primary) hypertension  Blood pressures are slightly lower than normal she does get some episodes of hypotension however on average her blood pressure is stable  Blood pressure lowering medications include  Carvedilol 25 mg daily  Amlodipine 5 mg daily  Amiloride 5 mg 2 times daily which was prescribed to her for a partial diabetes insipidus, overall her sodium levels are stable       Hyperparathyroidism, unspecified ([facility])  Will monitor PTH       Hyperphosphatemia  Continue low phosphorus diet       Hypokalemia  Potassium levels are acceptable at this point           History of Present Illness   HPI  Ayleen Moralez is a 64 y.o. female who presents for follow-up.  Overall she is doing relatively well has no acute complaints, blood pressures have been stable, tolerating her medications, urine output has been  acceptable,  History obtained from: patient    Review of Systems   Constitutional:  Negative for chills and fever.   HENT:  Negative for ear pain and sore throat.    Eyes:  Negative for pain and visual disturbance.   Respiratory:  Negative for cough and shortness of breath.    Cardiovascular:  Negative for chest pain and palpitations.   Gastrointestinal:  Negative for abdominal pain and vomiting.   Genitourinary:  Negative for dysuria and hematuria.   Musculoskeletal:  Negative for arthralgias and back pain.   Skin:  Negative for color change and rash.   Neurological:  Negative for seizures and syncope.   All other systems reviewed and are negative.    Medical History Reviewed by provider this encounter:     .  Past Medical History   Past Medical History:   Diagnosis Date    Aftercare following left knee joint replacement surgery 02/15/2024    Anxiety     Anxiety disorder     Arthritis     At risk for falls     Bipolar 2 disorder ([facility])     Chronic back pain     Chronic kidney disease     Closed fracture of distal end of right fibula with routine healing 11/04/2020    COVID-19     in Jan 2021    CVA (cerebral vascular accident) (HCC)     noted on MRI in the past    Depression     GERD (gastroesophageal reflux disease)     Hypercholesteremia     Hypernatremia     Hypertension     Hypokalemia     Idiopathic chronic pancreatitis (HCC) 03/20/2018    Intervertebral disc disorder with radiculopathy of lumbosacral region     resolved: 12/28/2015    Limb alert care status     LUE-fistula    Panic attacks     Pericardial effusion     PONV (postoperative nausea and vomiting)     Psychiatric problem     Radiculitis     resolved: 12/28/2015    Secondary renal hyperparathyroidism (HCC)     Stroke (HCC)     Vitamin D deficiency      Past Surgical History:   Procedure Laterality Date    BUNIONECTOMY      Left foot     CAST APPLICATION Right 06/23/2022    Procedure: Application short-arm thumb spica splint;  Surgeon: Lyndon  MD Esme;  Location: [facility];  Service: Orthopedics    COLON SURGERY      COLONOSCOPY  2018    COLONOSCOPY  2021    DILATION AND CURETTAGE OF UTERUS      INDUCED       surgically induced    TX ARTERIOVENOUS ANASTOMOSIS OPEN DIRECT Left 2019    Procedure: CREATION FISTULA ARTERIOVENOUS (AV) left wrists possible left upper;  Surgeon: Andrey Quintero MD;  Location: [facility];  Service: Vascular    TX ARTHRP KNE CONDYLE&PLATU MEDIAL&LAT COMPARTMENTS Left 2024    Procedure: ARTHROPLASTY KNEE TOTAL SAME DAY;  Surgeon: Riki Ma MD;  Location: [facility];  Service: Orthopedics    TX CORRJ HLX VLGS BNCTY SESMDC DSTL METAR OSTEOT Left 2019    Procedure: Serge bunionectomy;  Surgeon: Munir Larkin DPM;  [facility]: [facility];  Service: Podiatry    TX CORRJ HLX VLGS BNCTY SESMDC DSTL METAR OSTEOT Right 2020    Procedure: BUNIONECTOMY RAFAEL;  Surgeon: Munir Larkin DPM;  Location: [facility];  Service: Podiatry    TX CORRJ HLX VLGS BNCTY SESMDC PROX PHLX OSTEOT Right 2021    Procedure: BUNIONECTOMY TAMEKA, right tameka osteotomy and 2nd claw toe correction;  Surgeon: James R Lachman, MD;  Location: [facility];  Service: Orthopedics    TX ERCP DX COLLECTION SPECIMEN BRUSHING/WASHING N/A 2018    Procedure: ENDOSCOPIC RETROGRADE CHOLANGIOPANCREATOGRAPHY (ERCP);  Surgeon: Alfredo Messina MD;  Location: [facility];  Service: Gastroenterology    TX LAPAROSCOPY PROCTOPEXY PROLAPSE N/A 2018    Procedure: ROBOTIC SIGMOID RESECTION / RECTOPEXY;  Surgeon: ELPIDIO Mcnulty MD;  [facility]: [facility];  Service: Colorectal    TX OPEN TREATMENT RADIAL SHAFT FRACTURE W/INT FIXJ Right 10/11/2021    Procedure: OPEN REDUCTION W/ INTERNAL FIXATION (ORIF) RADIUS (WRIST), RIGHT DISTAL;  Surgeon: Riki Ma MD;  Location: [facility];  Service: Orthopedics    TX REMOVAL IMPLANT DEEP Right 2022    Procedure: Removal of hardware volar aspect right distal radius (distal  radial plate and screws);  Surgeon: Lyndon Victoria MD;  Location: [facility];  Service: Orthopedics    MN SIGMOIDOSCOPY FLX DX W/COLLJ SPEC BR/WA IF PFRMD N/A 07/13/2018    Procedure: SIGMOIDOSCOPY FLEXIBLE;  Surgeon: ELPIDIO Mcnulty MD;  [facility]: [facility];  Service: Colorectal    MN TR TDN RESTORE INTRNSC FUNCJ ALL 4 FNGRS Right 06/23/2022    Procedure: Right ring finger flexor digitorum superficialis to flexor pollicis longus tendon transfer;  Surgeon: Lyndon Victoria MD;  Location: [facility];  Service: Orthopedics    TUBAL LIGATION Bilateral 1997    US GUIDED THYROID BIOPSY  07/30/2019     Family History   Problem Relation Age of Onset    Bipolar disorder Mother     Mental illness Mother         depression    Stroke Mother     Dementia Mother     Colon polyps Mother     Heart disease Father     Hypertension Father     Diabetes Father     Mental illness Sister     Colon polyps Sister     Mental illness Sister     Heart disease Sister     No Known Problems Sister     Breast cancer Sister 68    No Known Problems Maternal Grandmother     No Known Problems Maternal Grandfather     Breast cancer Paternal Grandmother         age unknown    No Known Problems Paternal Grandfather     Hypertension Son     Obesity Son     No Known Problems Son     Breast cancer Maternal Aunt         age unknown    Breast cancer Paternal Aunt         age unknown    Breast cancer Paternal Aunt         age unknown    Diabetes Family     Heart disease Family     Hypertension Family     Stroke Family     Thyroid disease Family     Throat cancer Niece 48    Substance Abuse Neg Hx         neg fam hx    Colon cancer Neg Hx       reports that she has never smoked. She has never used smokeless tobacco. She reports that she does not currently use alcohol. She reports that she does not currently use drugs after having used the following drugs: Hydrocodone and Marijuana.  Current Outpatient Medications on File Prior to Visit   Medication  Sig Dispense Refill    acetaminophen (TYLENOL) 500 mg tablet Take 2 tablets (1,000 mg total) by mouth every 8 (eight) hours 60 tablet 0    albuterol (Ventolin HFA) 90 mcg/act inhaler Inhale 2 puffs every 6 (six) hours as needed for wheezing or shortness of breath 8.5 g 3    AMILoride 5 mg tablet TAKE 1 TABLET BY MOUTH TWICE  DAILY 180 tablet 1    amLODIPine (NORVASC) 5 mg tablet Take 1 tablet (5 mg total) by mouth daily 90 tablet 1    ascorbic acid (VITAMIN C) 500 MG tablet Take 1 tablet (500 mg total) by mouth 2 (two) times a day (Patient taking differently: Take 500 mg by mouth daily) 60 tablet 2    aspirin 81 mg chewable tablet Chew 1 tablet (81 mg total) 2 (two) times a day (Patient taking differently: Chew 81 mg daily) 60 tablet 0    bisacodyl (DULCOLAX) 5 mg EC tablet Take 2 tablets by mouth at 4pm and take 2 tablets by mouth at 8pm the day before your scheduled colonoscopy. Do not crush or chew. Do not take within 1 hour of milk, any dairy product, or antacid. 4 tablet 0    budesonide-formoterol (Symbicort) 160-4.5 mcg/act inhaler Inhale 2 puffs 2 (two) times a day Rinse mouth after use. 10.2 g 11    calcium carbonate (Calcium 600) 600 MG tablet Take 600 mg by mouth 2 (two) times a day with meals      carvedilol (COREG) 25 mg tablet TAKE 1 TABLET BY MOUTH TWICE  DAILY WITH MEALS 180 tablet 1    cholecalciferol (VITAMIN D3) 1,000 units tablet Take 5 tablets (5,000 Units total) by mouth daily 90 tablet 5    clonazePAM (KlonoPIN) 0.5 mg tablet Take 1 tablet (0.5 mg total) by mouth 3 (three) times a day Do not start before January 2, 2025. 270 tablet 0    Cyanocobalamin (VITAMIN B 12 PO) Take 1,000 mcg by mouth in the morning      Diclofenac Sodium (VOLTAREN) 1 % Apply 2 g topically 4 (four) times a day for 7 days 56 g 0    ferrous sulfate 324 (65 Fe) mg Take 1 tablet (324 mg total) by mouth 2 (two) times a day before meals (Patient taking differently: Take 324 mg by mouth daily before breakfast) 60 tablet 2     fluvoxaMINE (LUVOX) 100 mg tablet Fluvoxamine 100m tablet in the morning ; 2 tablets at night 270 tablet 2    lamoTRIgine (LaMICtal) 200 MG tablet Lamotrigine 200m tablet in the morning , 1 tablet at night 180 tablet 3    montelukast (SINGULAIR) 10 mg tablet Take 1 tablet (10 mg total) by mouth daily at bedtime 30 tablet 5    Multiple Vitamin (MULTI-VITAMIN) tablet Take 1 tablet by mouth daily 30 tablet 2    omeprazole (PriLOSEC) 40 MG capsule TAKE 1 CAPSULE BY MOUTH DAILY  BEFORE BREAKFAST 90 capsule 3    ondansetron (ZOFRAN) 4 mg tablet Take 1 tablet (4 mg total) by mouth every 8 (eight) hours as needed for nausea or vomiting 30 tablet 1    QUEtiapine (SEROquel) 100 mg tablet Take 1 tablet (100 mg total) by mouth in the morning 90 tablet 3    QUEtiapine (SEROquel) 300 mg tablet Take 1 tablet (300 mg total) by mouth in the morning 90 tablet 3    QUEtiapine (SEROquel) 400 MG tablet Take 1 tablet (400 mg total) by mouth daily at bedtime Take with the Seroquel 100 mg to total 500 mg daily at bedtime 90 tablet 3    rosuvastatin (CRESTOR) 20 MG tablet TAKE 1 TABLET BY MOUTH IN THE  EVENING 90 tablet 3    traMADol (ULTRAM) 50 mg tablet Take 1 tablet (50 mg total) by mouth 2 (two) times a day as needed for moderate pain 60 tablet 0    polyethylene glycol (GLYCOLAX) 17 GM/SCOOP powder Take 238 g by mouth once for 1 dose Use as directed 238 g 0     No current facility-administered medications on file prior to visit.     Allergies   Allergen Reactions    Molds & Smuts Nasal Congestion    Bee Pollen Nasal Congestion     Other reaction(s): Nasal Congestion    Pollen Extract Nasal Congestion      Current Outpatient Medications on File Prior to Visit   Medication Sig Dispense Refill    acetaminophen (TYLENOL) 500 mg tablet Take 2 tablets (1,000 mg total) by mouth every 8 (eight) hours 60 tablet 0    albuterol (Ventolin HFA) 90 mcg/act inhaler Inhale 2 puffs every 6 (six) hours as needed for wheezing or shortness of  breath 8.5 g 3    AMILoride 5 mg tablet TAKE 1 TABLET BY MOUTH TWICE  DAILY 180 tablet 1    amLODIPine (NORVASC) 5 mg tablet Take 1 tablet (5 mg total) by mouth daily 90 tablet 1    ascorbic acid (VITAMIN C) 500 MG tablet Take 1 tablet (500 mg total) by mouth 2 (two) times a day (Patient taking differently: Take 500 mg by mouth daily) 60 tablet 2    aspirin 81 mg chewable tablet Chew 1 tablet (81 mg total) 2 (two) times a day (Patient taking differently: Chew 81 mg daily) 60 tablet 0    bisacodyl (DULCOLAX) 5 mg EC tablet Take 2 tablets by mouth at 4pm and take 2 tablets by mouth at 8pm the day before your scheduled colonoscopy. Do not crush or chew. Do not take within 1 hour of milk, any dairy product, or antacid. 4 tablet 0    budesonide-formoterol (Symbicort) 160-4.5 mcg/act inhaler Inhale 2 puffs 2 (two) times a day Rinse mouth after use. 10.2 g 11    calcium carbonate (Calcium 600) 600 MG tablet Take 600 mg by mouth 2 (two) times a day with meals      carvedilol (COREG) 25 mg tablet TAKE 1 TABLET BY MOUTH TWICE  DAILY WITH MEALS 180 tablet 1    cholecalciferol (VITAMIN D3) 1,000 units tablet Take 5 tablets (5,000 Units total) by mouth daily 90 tablet 5    clonazePAM (KlonoPIN) 0.5 mg tablet Take 1 tablet (0.5 mg total) by mouth 3 (three) times a day Do not start before 2025. 270 tablet 0    Cyanocobalamin (VITAMIN B 12 PO) Take 1,000 mcg by mouth in the morning      Diclofenac Sodium (VOLTAREN) 1 % Apply 2 g topically 4 (four) times a day for 7 days 56 g 0    ferrous sulfate 324 (65 Fe) mg Take 1 tablet (324 mg total) by mouth 2 (two) times a day before meals (Patient taking differently: Take 324 mg by mouth daily before breakfast) 60 tablet 2    fluvoxaMINE (LUVOX) 100 mg tablet Fluvoxamine 100m tablet in the morning ; 2 tablets at night 270 tablet 2    lamoTRIgine (LaMICtal) 200 MG tablet Lamotrigine 200m tablet in the morning , 1 tablet at night 180 tablet 3    montelukast (SINGULAIR) 10  "mg tablet Take 1 tablet (10 mg total) by mouth daily at bedtime 30 tablet 5    Multiple Vitamin (MULTI-VITAMIN) tablet Take 1 tablet by mouth daily 30 tablet 2    omeprazole (PriLOSEC) 40 MG capsule TAKE 1 CAPSULE BY MOUTH DAILY  BEFORE BREAKFAST 90 capsule 3    ondansetron (ZOFRAN) 4 mg tablet Take 1 tablet (4 mg total) by mouth every 8 (eight) hours as needed for nausea or vomiting 30 tablet 1    QUEtiapine (SEROquel) 100 mg tablet Take 1 tablet (100 mg total) by mouth in the morning 90 tablet 3    QUEtiapine (SEROquel) 300 mg tablet Take 1 tablet (300 mg total) by mouth in the morning 90 tablet 3    QUEtiapine (SEROquel) 400 MG tablet Take 1 tablet (400 mg total) by mouth daily at bedtime Take with the Seroquel 100 mg to total 500 mg daily at bedtime 90 tablet 3    rosuvastatin (CRESTOR) 20 MG tablet TAKE 1 TABLET BY MOUTH IN THE  EVENING 90 tablet 3    traMADol (ULTRAM) 50 mg tablet Take 1 tablet (50 mg total) by mouth 2 (two) times a day as needed for moderate pain 60 tablet 0    polyethylene glycol (GLYCOLAX) 17 GM/SCOOP powder Take 238 g by mouth once for 1 dose Use as directed 238 g 0     No current facility-administered medications on file prior to visit.      Social History     Tobacco Use    Smoking status: Never    Smokeless tobacco: Never   Vaping Use    Vaping status: Never Used   Substance and Sexual Activity    Alcohol use: Not Currently    Drug use: Not Currently     Types: Hydrocodone, Marijuana     Comment: JESSE Abbasi has h/o marijuana abuse- not currently    Sexual activity: Not Currently     Partners: Male     Birth control/protection: Post-menopausal        Objective   /60 (BP Location: Right arm, Patient Position: Sitting, Cuff Size: Large)   Ht 5' 7\" (1.702 m)   Wt 93.4 kg (206 lb)   LMP  (LMP Unknown)   BMI 32.26 kg/m²      Physical Exam  Vitals and nursing note reviewed.   Constitutional:       General: She is not in acute distress.     Appearance: She is well-developed. "   HENT:      Head: Normocephalic and atraumatic.   Eyes:      Conjunctiva/sclera: Conjunctivae normal.   Cardiovascular:      Rate and Rhythm: Normal rate and regular rhythm.      Heart sounds: No murmur heard.  Pulmonary:      Effort: Pulmonary effort is normal. No respiratory distress.      Breath sounds: Normal breath sounds.   Abdominal:      Palpations: Abdomen is soft.      Tenderness: There is no abdominal tenderness.   Musculoskeletal:         General: No swelling.      Cervical back: Neck supple.   Skin:     General: Skin is warm and dry.      Capillary Refill: Capillary refill takes less than 2 seconds.   Neurological:      Mental Status: She is alert.   Psychiatric:         Mood and Affect: Mood normal.

## 2025-01-31 NOTE — ASSESSMENT & PLAN NOTE
Overall stable, following up with psychiatry, she was asking about weight loss with Wegovy and I told her to discuss this with her psychiatrist before seeing nutritionist

## 2025-01-31 NOTE — ASSESSMENT & PLAN NOTE
Blood pressures are slightly lower than normal she does get some episodes of hypotension however on average her blood pressure is stable  Blood pressure lowering medications include  Carvedilol 25 mg daily  Amlodipine 5 mg daily  Amiloride 5 mg 2 times daily which was prescribed to her for a partial diabetes insipidus, overall her sodium levels are stable

## 2025-01-31 NOTE — ASSESSMENT & PLAN NOTE
Renal function has remained stable her creatinine has remained between 2-2.5, estimated GFR is been 18 to 20 mL/min  She has a left radiocephalic fistula in the event that she needs dialysis  Her blood pressures are at goal  Acid-base status is acceptable  Continue current medications  She is being worked up for renal transplant and preemptive listing    Orders:    Comprehensive metabolic panel; Future    CBC and Platelet; Future    Albumin / creatinine urine ratio; Future    Urinalysis with microscopic; Future    Phosphorus; Future    Uric acid; Future    Magnesium; Future    PTH, intact; Future

## 2025-02-03 ENCOUNTER — HOSPITAL ENCOUNTER (OUTPATIENT)
Dept: GASTROENTEROLOGY | Facility: HOSPITAL | Age: 65
Setting detail: OUTPATIENT SURGERY
Discharge: HOME | End: 2025-02-03
Attending: INTERNAL MEDICINE
Payer: MEDICARE

## 2025-02-03 ENCOUNTER — ANESTHESIA (OUTPATIENT)
Dept: GASTROENTEROLOGY | Facility: HOSPITAL | Age: 65
End: 2025-02-03
Payer: MEDICARE

## 2025-02-03 ENCOUNTER — ANESTHESIA EVENT (OUTPATIENT)
Dept: GASTROENTEROLOGY | Facility: HOSPITAL | Age: 65
End: 2025-02-03
Payer: MEDICARE

## 2025-02-03 VITALS
HEART RATE: 80 BPM | RESPIRATION RATE: 18 BRPM | TEMPERATURE: 97.9 F | OXYGEN SATURATION: 100 % | WEIGHT: 204 LBS | HEIGHT: 67 IN | SYSTOLIC BLOOD PRESSURE: 133 MMHG | DIASTOLIC BLOOD PRESSURE: 58 MMHG | BODY MASS INDEX: 32.02 KG/M2

## 2025-02-03 DIAGNOSIS — R13.19 ESOPHAGEAL DYSPHAGIA: ICD-10-CM

## 2025-02-03 DIAGNOSIS — Z12.11 COLON CANCER SCREENING: ICD-10-CM

## 2025-02-03 PROCEDURE — 88305 TISSUE EXAM BY PATHOLOGIST: CPT | Performed by: PATHOLOGY

## 2025-02-03 PROCEDURE — 45380 COLONOSCOPY AND BIOPSY: CPT | Performed by: INTERNAL MEDICINE

## 2025-02-03 PROCEDURE — 43450 DILATE ESOPHAGUS 1/MULT PASS: CPT | Performed by: INTERNAL MEDICINE

## 2025-02-03 PROCEDURE — 43239 EGD BIOPSY SINGLE/MULTIPLE: CPT | Performed by: INTERNAL MEDICINE

## 2025-02-03 RX ORDER — PROPOFOL 10 MG/ML
INJECTION, EMULSION INTRAVENOUS AS NEEDED
Status: DISCONTINUED | OUTPATIENT
Start: 2025-02-03 | End: 2025-02-03

## 2025-02-03 RX ORDER — LIDOCAINE HYDROCHLORIDE 20 MG/ML
INJECTION, SOLUTION EPIDURAL; INFILTRATION; INTRACAUDAL; PERINEURAL AS NEEDED
Status: DISCONTINUED | OUTPATIENT
Start: 2025-02-03 | End: 2025-02-03

## 2025-02-03 RX ORDER — SODIUM CHLORIDE 9 MG/ML
INJECTION, SOLUTION INTRAVENOUS CONTINUOUS PRN
Status: DISCONTINUED | OUTPATIENT
Start: 2025-02-03 | End: 2025-02-03

## 2025-02-03 RX ADMIN — PROPOFOL 50 MG: 10 INJECTION, EMULSION INTRAVENOUS at 14:51

## 2025-02-03 RX ADMIN — PROPOFOL 30 MG: 10 INJECTION, EMULSION INTRAVENOUS at 14:23

## 2025-02-03 RX ADMIN — PROPOFOL 50 MG: 10 INJECTION, EMULSION INTRAVENOUS at 14:20

## 2025-02-03 RX ADMIN — PROPOFOL 50 MG: 10 INJECTION, EMULSION INTRAVENOUS at 14:34

## 2025-02-03 RX ADMIN — LIDOCAINE HYDROCHLORIDE 100 MG: 20 INJECTION, SOLUTION EPIDURAL; INFILTRATION; INTRACAUDAL; PERINEURAL at 14:15

## 2025-02-03 RX ADMIN — SODIUM CHLORIDE: 0.9 INJECTION, SOLUTION INTRAVENOUS at 14:05

## 2025-02-03 RX ADMIN — PROPOFOL 50 MG: 10 INJECTION, EMULSION INTRAVENOUS at 14:39

## 2025-02-03 RX ADMIN — PROPOFOL 150 MG: 10 INJECTION, EMULSION INTRAVENOUS at 14:15

## 2025-02-03 RX ADMIN — PROPOFOL 50 MG: 10 INJECTION, EMULSION INTRAVENOUS at 14:29

## 2025-02-03 RX ADMIN — PROPOFOL 50 MG: 10 INJECTION, EMULSION INTRAVENOUS at 14:45

## 2025-02-03 NOTE — H&P
History and Physical - [facility]  Ayleen Moralez 64 y.o. female MRN: [id number]    HPI: Ayleen Moralez is a 64 y.o. year old female who presents for EGD colonoscopy for dysphagia and history of polyps    REVIEW OF SYSTEMS: Per the HPI, and otherwise unremarkable.    Historical Information   Past Medical History:   Diagnosis Date    Aftercare following left knee joint replacement surgery 02/15/2024    Anxiety     Anxiety disorder     Arthritis     At risk for falls     Bipolar 2 disorder (HCC)     Chronic back pain     Chronic kidney disease     Closed fracture of distal end of right fibula with routine healing 2020    COVID-19     in 2021    CVA (cerebral vascular accident) (HCC)     noted on MRI in the past    Depression     GERD (gastroesophageal reflux disease)     Hypercholesteremia     Hypernatremia     Hypertension     Hypokalemia     Idiopathic chronic pancreatitis (HCC) 2018    Intervertebral disc disorder with radiculopathy of lumbosacral region     resolved: 2015    Limb alert care status     LUE-fistula    Panic attacks     Pericardial effusion     PONV (postoperative nausea and vomiting)     Psychiatric problem     Radiculitis     resolved: 2015    Secondary renal hyperparathyroidism (HCC)     Stroke (HCC)     Vitamin D deficiency      Past Surgical History:   Procedure Laterality Date    BUNIONECTOMY      Left foot     CAST APPLICATION Right 2022    Procedure: Application short-arm thumb spica splint;  Surgeon: Lyndon Victoria MD;  Location:  MAIN OR;  Service: Orthopedics    COLON SURGERY      COLONOSCOPY  2018    COLONOSCOPY  2021    DILATION AND CURETTAGE OF UTERUS      INDUCED       surgically induced    ME ARTERIOVENOUS ANASTOMOSIS OPEN DIRECT Left 2019    Procedure: CREATION FISTULA ARTERIOVENOUS (AV) left wrists possible left upper;  Surgeon: Andrey Quintero MD;  Location:  MAIN OR;  Service: Vascular     MO ARTHRP KNE CONDYLE&PLATU MEDIAL&LAT COMPARTMENTS Left 02/05/2024    Procedure: ARTHROPLASTY KNEE TOTAL SAME DAY;  Surgeon: Riki Ma MD;  Location: [facility];  Service: Orthopedics    MO CORRJ HLX VLGS BNCTY SESMDC DSTL METAR OSTEOT Left 07/01/2019    Procedure: Serge bunionectomy;  Surgeon: Munir Larkin DPM;  Location: [facility];  Service: Podiatry    MO CORRJ HLX VLGS BNCTY SESMDC DSTL METAR OSTEOT Right 08/03/2020    Procedure: BUNIONECTOMY RAFAEL;  Surgeon: Munir Larkin DPM;  Location: [facility];  Service: Podiatry    MO CORRJ HLX VLGS BNCTY SESMDC PROX PHLX OSTEOT Right 09/27/2021    Procedure: BUNIONECTOMY TAMEKA, right tameka osteotomy and 2nd claw toe correction;  Surgeon: James R Lachman, MD;  Location: [facility];  Service: Orthopedics    MO ERCP DX COLLECTION SPECIMEN BRUSHING/WASHING N/A 04/11/2018    Procedure: ENDOSCOPIC RETROGRADE CHOLANGIOPANCREATOGRAPHY (ERCP);  Surgeon: Alfredo Messina MD;  Location: [facility];  Service: Gastroenterology    MO LAPAROSCOPY PROCTOPEXY PROLAPSE N/A 07/13/2018    Procedure: ROBOTIC SIGMOID RESECTION / RECTOPEXY;  Surgeon: ELPIDIO Mcnulty MD;  Location: [facility];  Service: Colorectal    MO OPEN TREATMENT RADIAL SHAFT FRACTURE W/INT FIXJ Right 10/11/2021    Procedure: OPEN REDUCTION W/ INTERNAL FIXATION (ORIF) RADIUS (WRIST), RIGHT DISTAL;  Surgeon: Riki Ma MD;  Location: [facility];  Service: Orthopedics    MO REMOVAL IMPLANT DEEP Right 06/23/2022    Procedure: Removal of hardware volar aspect right distal radius (distal radial plate and screws);  Surgeon: Lyndon Victoria MD;  Location: [facility];  Service: Orthopedics    MO SIGMOIDOSCOPY FLX DX W/COLLJ SPEC BR/WA IF PFRMD N/A 07/13/2018    Procedure: SIGMOIDOSCOPY FLEXIBLE;  Surgeon: ELPIDIO Mcnulty MD;  [facility];  Service: Colorectal    MO TR TDN RESTORE INTRNSC FUNCJ ALL 4 FNGRS Right 06/23/2022    Procedure: Right ring finger flexor digitorum superficialis to flexor  pollicis longus tendon transfer;  Surgeon: Lyndon Victoria MD;  Location: [facility];  Service: Orthopedics    TUBAL LIGATION Bilateral 1997    US GUIDED THYROID BIOPSY  07/30/2019     Social History   Social History     Substance and Sexual Activity   Alcohol Use Not Currently     Social History     Substance and Sexual Activity   Drug Use Not Currently    Types: Hydrocodone, Marijuana    Comment: MEDICATION   Ayleen has h/o marijuana abuse- not currently     Social History     Tobacco Use   Smoking Status Never   Smokeless Tobacco Never     Family History   Problem Relation Age of Onset    Bipolar disorder Mother     Mental illness Mother         depression    Stroke Mother     Dementia Mother     Colon polyps Mother     Heart disease Father     Hypertension Father     Diabetes Father     Mental illness Sister     Colon polyps Sister     Mental illness Sister     Heart disease Sister     No Known Problems Sister     Breast cancer Sister 68    No Known Problems Maternal Grandmother     No Known Problems Maternal Grandfather     Breast cancer Paternal Grandmother         age unknown    No Known Problems Paternal Grandfather     Hypertension Son     Obesity Son     No Known Problems Son     Breast cancer Maternal Aunt         age unknown    Breast cancer Paternal Aunt         age unknown    Breast cancer Paternal Aunt         age unknown    Diabetes Family     Heart disease Family     Hypertension Family     Stroke Family     Thyroid disease Family     Throat cancer Niece 48    Substance Abuse Neg Hx         neg fam hx    Colon cancer Neg Hx        Meds/Allergies       Current Outpatient Medications:     acetaminophen (TYLENOL) 500 mg tablet    AMILoride 5 mg tablet    amLODIPine (NORVASC) 5 mg tablet    ascorbic acid (VITAMIN C) 500 MG tablet    aspirin 81 mg chewable tablet    bisacodyl (DULCOLAX) 5 mg EC tablet    calcium carbonate (Calcium 600) 600 MG tablet    carvedilol (COREG) 25 mg tablet     "cholecalciferol (VITAMIN D3) 1,000 units tablet    clonazePAM (KlonoPIN) 0.5 mg tablet    Cyanocobalamin (VITAMIN B 12 PO)    ferrous sulfate 324 (65 Fe) mg    fluvoxaMINE (LUVOX) 100 mg tablet    lamoTRIgine (LaMICtal) 200 MG tablet    Multiple Vitamin (MULTI-VITAMIN) tablet    omeprazole (PriLOSEC) 40 MG capsule    ondansetron (ZOFRAN) 4 mg tablet    QUEtiapine (SEROquel) 100 mg tablet    QUEtiapine (SEROquel) 300 mg tablet    QUEtiapine (SEROquel) 400 MG tablet    rosuvastatin (CRESTOR) 20 MG tablet    traMADol (ULTRAM) 50 mg tablet    albuterol (Ventolin HFA) 90 mcg/act inhaler    budesonide-formoterol (Symbicort) 160-4.5 mcg/act inhaler    Diclofenac Sodium (VOLTAREN) 1 %    montelukast (SINGULAIR) 10 mg tablet    polyethylene glycol (GLYCOLAX) 17 GM/SCOOP powder    Allergies   Allergen Reactions    Molds & Smuts Nasal Congestion    Bee Pollen Nasal Congestion     Other reaction(s): Nasal Congestion    Pollen Extract Nasal Congestion       Objective     /67   Pulse 82   Temp 97.9 °F (36.6 °C) (Temporal)   Resp 18   Ht 5' 7\" (1.702 m)   Wt 92.5 kg (204 lb)   LMP  (LMP Unknown)   SpO2 95%   BMI 31.95 kg/m²     PHYSICAL EXAM    Gen: NAD AAOx3  Head: Normocephalic, Atraumatic  CV: S1S2 RRR no m/r/g  CHEST: Clear b/l no c/r/w  ABD: soft, +BS NT/ND  EXT: no edema    ASSESSMENT/PLAN:  This is a 64 y.o. year old female here for EGD colonoscopy, and she is stable and optimized for her procedure.        "

## 2025-02-03 NOTE — ANESTHESIA POSTPROCEDURE EVALUATION
Post-Op Assessment Note    CV Status:  Stable    Pain management: adequate       Mental Status:  Alert and awake   Hydration Status:  Euvolemic   PONV Controlled:  Controlled   Airway Patency:  Patent     Post Op Vitals Reviewed: Yes    No anethesia notable event occurred.    Staff: CRNA       Last Filed PACU Vitals:  Vitals Value Taken Time   Temp     Pulse 78    /55    Resp 20    SpO2 100

## 2025-02-03 NOTE — ANESTHESIA PREPROCEDURE EVALUATION
Procedure:  EGD  COLONOSCOPY    Relevant Problems   CARDIO   (+) Essential (primary) hypertension   (+) Moderate aortic stenosis   (+) Pure hypercholesterolemia, unspecified   (+) Steal syndrome dialysis vascular access (HCC)      ENDO   (+) Hyperparathyroidism, unspecified (HCC)   (+) Secondary hyperparathyroidism of renal origin (HCC)      GI/HEPATIC   (+) Gastro-esophageal reflux disease without esophagitis   (+) Other chronic pancreatitis (HCC)      /RENAL   (+) Renal cyst   (+) Stage 4 chronic kidney disease (HCC)      MUSCULOSKELETAL   (+) Low back pain, unspecified   (+) Primary localized osteoarthritis of knees, bilateral   (+) Primary osteoarthritis of right knee   (+) Unilateral primary osteoarthritis, left knee      NEURO/PSYCH   (+) Anxiety disorder, unspecified   (+) Major depressive disorder, single episode, unspecified   (+) Other chronic pain   (+) Panic disorder (episodic paroxysmal anxiety)      PULMONARY   (+) Moderate persistent asthma, uncomplicated   (+) SPENCER (obstructive sleep apnea)        Physical Exam    Airway    Mallampati score: II         Dental   No notable dental hx     Cardiovascular      Pulmonary      Other Findings  post-pubertal.      Anesthesia Plan  ASA Score- 3     Anesthesia Type- IV sedation with anesthesia with ASA Monitors.         Additional Monitors:     Airway Plan:     Comment: I, Dr. Lambert, the attending physician, have personally seen and evaluated the patient prior to anesthetic care.  I have reviewed the pre-anesthetic record, and other medical records if appropriate to the anesthetic care.  If a CRNA is involved in the case, I have reviewed the CRNA assessment, if present, and agree.  The patient is in a suitable condition to proceed with my formulated anesthetic plan.  .       Plan Factors-    Chart reviewed.                      Induction- intravenous.    Postoperative Plan-         Informed Consent- Anesthetic plan and risks discussed with patient.  I  personally reviewed this patient with the CRNA. Discussed and agreed on the Anesthesia Plan with the CRNA..      NPO Status:  Vitals Value Taken Time   Date of last liquid 02/03/25 02/03/25 1315   Time of last liquid 0600 02/03/25 1315   Date of last solid 02/01/25 02/03/25 1315   Time of last solid 2000 02/03/25 1315

## 2025-02-04 ENCOUNTER — TELEPHONE (OUTPATIENT)
Dept: BEHAVIORAL/MENTAL HEALTH CLINIC | Facility: CLINIC | Age: 65
End: 2025-02-04

## 2025-02-04 DIAGNOSIS — M54.50 LUMBAR PAIN: ICD-10-CM

## 2025-02-04 DIAGNOSIS — M79.605 BILATERAL LEG PAIN: ICD-10-CM

## 2025-02-04 DIAGNOSIS — M79.604 BILATERAL LEG PAIN: ICD-10-CM

## 2025-02-04 RX ORDER — TRAMADOL HYDROCHLORIDE 50 MG/1
50 TABLET ORAL 2 TIMES DAILY PRN
Qty: 60 TABLET | Refills: 0 | Status: SHIPPED | OUTPATIENT
Start: 2025-02-04

## 2025-02-04 NOTE — TELEPHONE ENCOUNTER
Reason for call:   [x] Refill   [] Prior Auth  [] Other:     Office:   [x] PCP/Provider - Bette Fly, DO   [] Specialty/Provider -     Medication:     traMADol (ULTRAM) 50 mg tablet       Dose/Frequency: 50 mg, Oral, 2 times daily PRN     Quantity: 60    Pharmacy: [facility] [place] - [address] [facility].     Does the patient have enough for 3 days?   [x] Yes   [] No - Send as HP to POD

## 2025-02-04 NOTE — TELEPHONE ENCOUNTER
Prvidr called to mve appt Thsurendra due to conflict w/ ER dental appt. Offered virtual later that day or next. Ct. only wants in person. Sched'd to end of April. On wait list for cx b4 3/4. Ct. said doing okay. Offerred for her to contact me if needed. Sent client contact information via email.

## 2025-02-05 DIAGNOSIS — I10 HTN (HYPERTENSION): ICD-10-CM

## 2025-02-05 PROCEDURE — 88305 TISSUE EXAM BY PATHOLOGIST: CPT | Performed by: PATHOLOGY

## 2025-02-06 RX ORDER — AMILORIDE HYDROCHLORIDE 5 MG/1
5 TABLET ORAL 2 TIMES DAILY
Qty: 180 TABLET | Refills: 1 | Status: SHIPPED | OUTPATIENT
Start: 2025-02-06

## 2025-02-07 ENCOUNTER — TELEPHONE (OUTPATIENT)
Dept: NEPHROLOGY | Facility: CLINIC | Age: 65
End: 2025-02-07

## 2025-02-07 ENCOUNTER — TELEPHONE (OUTPATIENT)
Dept: BEHAVIORAL/MENTAL HEALTH CLINIC | Facility: CLINIC | Age: 65
End: 2025-02-07

## 2025-02-07 NOTE — TELEPHONE ENCOUNTER
Appointment canceled for Ayleen Moralez ([id number])  Visit type: VIRTUAL VISIT [facility]  1/20/2025 12:00 PM (60 minutes) with Inga BARRAZA in [facility] THERAPIST [facility]     Reason for cancellation: Weather

## 2025-02-10 ENCOUNTER — TELEPHONE (OUTPATIENT)
Age: 65
End: 2025-02-10

## 2025-02-10 ENCOUNTER — APPOINTMENT (OUTPATIENT)
Dept: LAB | Facility: HOSPITAL | Age: 65
End: 2025-02-10
Payer: MEDICARE

## 2025-02-10 ENCOUNTER — RESULTS FOLLOW-UP (OUTPATIENT)
Dept: GASTROENTEROLOGY | Facility: CLINIC | Age: 65
End: 2025-02-10

## 2025-02-10 ENCOUNTER — HOSPITAL ENCOUNTER (OUTPATIENT)
Dept: ULTRASOUND IMAGING | Facility: HOSPITAL | Age: 65
Discharge: HOME | End: 2025-02-10
Payer: MEDICARE

## 2025-02-10 DIAGNOSIS — R79.89 ELEVATED LFTS: ICD-10-CM

## 2025-02-10 DIAGNOSIS — R74.01 ELEVATED LIVER TRANSAMINASE LEVEL: ICD-10-CM

## 2025-02-10 DIAGNOSIS — N18.4 CHRONIC KIDNEY DISEASE, STAGE IV (SEVERE) (HCC): ICD-10-CM

## 2025-02-10 DIAGNOSIS — R79.89 ELEVATED LIVER FUNCTION TESTS: ICD-10-CM

## 2025-02-10 LAB
ALBUMIN SERPL BCG-MCNC: 4.2 G/DL (ref 3.5–5)
ALP SERPL-CCNC: 112 U/L (ref 34–104)
ALT SERPL W P-5'-P-CCNC: 19 U/L (ref 7–52)
ANION GAP SERPL CALCULATED.3IONS-SCNC: 11 MMOL/L (ref 4–13)
AST SERPL W P-5'-P-CCNC: 21 U/L (ref 13–39)
BACTERIA UR QL AUTO: ABNORMAL /HPF
BASOPHILS # BLD AUTO: 0.08 THOUSANDS/ΜL (ref 0–0.1)
BASOPHILS NFR BLD AUTO: 1 % (ref 0–1)
BILIRUB SERPL-MCNC: 0.31 MG/DL (ref 0.2–1)
BILIRUB UR QL STRIP: NEGATIVE
BUN SERPL-MCNC: 25 MG/DL (ref 5–25)
CALCIUM SERPL-MCNC: 9.7 MG/DL (ref 8.4–10.2)
CHLORIDE SERPL-SCNC: 106 MMOL/L (ref 96–108)
CLARITY UR: CLEAR
CO2 SERPL-SCNC: 20 MMOL/L (ref 21–32)
COLOR UR: YELLOW
CREAT SERPL-MCNC: 3.36 MG/DL (ref 0.6–1.3)
CREAT UR-MCNC: 87.2 MG/DL
EOSINOPHIL # BLD AUTO: 0.35 THOUSAND/ΜL (ref 0–0.61)
EOSINOPHIL NFR BLD AUTO: 5 % (ref 0–6)
ERYTHROCYTE [DISTWIDTH] IN BLOOD BY AUTOMATED COUNT: 11.9 % (ref 11.6–15.1)
GFR SERPL CREATININE-BSD FRML MDRD: 13 ML/MIN/1.73SQ M
GGT SERPL-CCNC: 19 U/L (ref 9–64)
GLUCOSE SERPL-MCNC: 134 MG/DL (ref 65–140)
GLUCOSE UR STRIP-MCNC: NEGATIVE MG/DL
HCT VFR BLD AUTO: 38.1 % (ref 34.8–46.1)
HGB BLD-MCNC: 12 G/DL (ref 11.5–15.4)
HGB UR QL STRIP.AUTO: NEGATIVE
IMM GRANULOCYTES # BLD AUTO: 0.03 THOUSAND/UL (ref 0–0.2)
IMM GRANULOCYTES NFR BLD AUTO: 0 % (ref 0–2)
KETONES UR STRIP-MCNC: NEGATIVE MG/DL
LEUKOCYTE ESTERASE UR QL STRIP: ABNORMAL
LYMPHOCYTES # BLD AUTO: 1.76 THOUSANDS/ΜL (ref 0.6–4.47)
LYMPHOCYTES NFR BLD AUTO: 24 % (ref 14–44)
MAGNESIUM SERPL-MCNC: 1.6 MG/DL (ref 1.9–2.7)
MCH RBC QN AUTO: 32.3 PG (ref 26.8–34.3)
MCHC RBC AUTO-ENTMCNC: 31.5 G/DL (ref 31.4–37.4)
MCV RBC AUTO: 103 FL (ref 82–98)
MICROALBUMIN UR-MCNC: 13.4 MG/L
MICROALBUMIN/CREAT 24H UR: 15 MG/G CREATININE (ref 0–30)
MONOCYTES # BLD AUTO: 0.69 THOUSAND/ΜL (ref 0.17–1.22)
MONOCYTES NFR BLD AUTO: 10 % (ref 4–12)
NEUTROPHILS # BLD AUTO: 4.31 THOUSANDS/ΜL (ref 1.85–7.62)
NEUTS SEG NFR BLD AUTO: 60 % (ref 43–75)
NITRITE UR QL STRIP: NEGATIVE
NON-SQ EPI CELLS URNS QL MICRO: ABNORMAL /HPF
NRBC BLD AUTO-RTO: 0 /100 WBCS
PH UR STRIP.AUTO: 6 [PH]
PHOSPHATE SERPL-MCNC: 5.1 MG/DL (ref 2.3–4.1)
PLATELET # BLD AUTO: 209 THOUSANDS/UL (ref 149–390)
PMV BLD AUTO: 11.1 FL (ref 8.9–12.7)
POTASSIUM SERPL-SCNC: 3.6 MMOL/L (ref 3.5–5.3)
PROT SERPL-MCNC: 6.9 G/DL (ref 6.4–8.4)
PROT UR STRIP-MCNC: NEGATIVE MG/DL
PTH-INTACT SERPL-MCNC: 59.2 PG/ML (ref 12–88)
RBC # BLD AUTO: 3.71 MILLION/UL (ref 3.81–5.12)
RBC #/AREA URNS AUTO: ABNORMAL /HPF
SODIUM SERPL-SCNC: 137 MMOL/L (ref 135–147)
SP GR UR STRIP.AUTO: 1.02 (ref 1–1.03)
URATE SERPL-MCNC: 7.5 MG/DL (ref 2–7.5)
UROBILINOGEN UR STRIP-ACNC: <2 MG/DL
WBC # BLD AUTO: 7.22 THOUSAND/UL (ref 4.31–10.16)
WBC #/AREA URNS AUTO: ABNORMAL /HPF

## 2025-02-10 PROCEDURE — 85025 COMPLETE CBC W/AUTO DIFF WBC: CPT

## 2025-02-10 PROCEDURE — 82570 ASSAY OF URINE CREATININE: CPT

## 2025-02-10 PROCEDURE — 82043 UR ALBUMIN QUANTITATIVE: CPT

## 2025-02-10 PROCEDURE — 80053 COMPREHEN METABOLIC PANEL: CPT

## 2025-02-10 PROCEDURE — 83735 ASSAY OF MAGNESIUM: CPT

## 2025-02-10 PROCEDURE — 36415 COLL VENOUS BLD VENIPUNCTURE: CPT

## 2025-02-10 PROCEDURE — 84100 ASSAY OF PHOSPHORUS: CPT

## 2025-02-10 PROCEDURE — 81001 URINALYSIS AUTO W/SCOPE: CPT

## 2025-02-10 PROCEDURE — 84550 ASSAY OF BLOOD/URIC ACID: CPT

## 2025-02-10 PROCEDURE — 83970 ASSAY OF PARATHORMONE: CPT

## 2025-02-10 PROCEDURE — 82977 ASSAY OF GGT: CPT

## 2025-02-10 PROCEDURE — 76705 ECHO EXAM OF ABDOMEN: CPT

## 2025-02-10 NOTE — RESULT ENCOUNTER NOTE
Left voicemail for the patient.  EGD biopsies negative colon polyps adenoma no EGD recall.  5-year colonoscopy recall.

## 2025-02-10 NOTE — TELEPHONE ENCOUNTER
Pt called to advise she is at the lab for bloodwork and does not have her paperwork with her. She asked if can be faxed over. She advised that she is at a [facility] lab but they are not seeing the lab order.   Faxed over to:[phone]

## 2025-02-11 ENCOUNTER — RESULTS FOLLOW-UP (OUTPATIENT)
Dept: NEPHROLOGY | Facility: CLINIC | Age: 65
End: 2025-02-11

## 2025-02-11 DIAGNOSIS — N18.4 STAGE 4 CHRONIC KIDNEY DISEASE (HCC): Primary | ICD-10-CM

## 2025-02-11 NOTE — TELEPHONE ENCOUNTER
Spoke to pt regarding the following message     Arturo Mills, DO   Creatinine has gone up to 3.36 with a bicarb of 20 from 2 weeks ago was within her baseline..  Please call her and ensure she is stable no increased diarrhea or any recent illness.  Have her repeat a BMP in 1 week if she feels well     Pt verbalized understanding. Pt stated she has been having diarrhea and back pain as well.

## 2025-02-13 ENCOUNTER — RESULTS FOLLOW-UP (OUTPATIENT)
Dept: GASTROENTEROLOGY | Facility: CLINIC | Age: 65
End: 2025-02-13

## 2025-02-14 ENCOUNTER — OFFICE VISIT (OUTPATIENT)
Dept: FAMILY MEDICINE CLINIC | Facility: HOSPITAL | Age: 65
End: 2025-02-14
Payer: MEDICARE

## 2025-02-14 VITALS
OXYGEN SATURATION: 97 % | BODY MASS INDEX: 31.67 KG/M2 | SYSTOLIC BLOOD PRESSURE: 138 MMHG | TEMPERATURE: 99.5 F | HEART RATE: 75 BPM | WEIGHT: 201.8 LBS | HEIGHT: 67 IN | DIASTOLIC BLOOD PRESSURE: 86 MMHG

## 2025-02-14 DIAGNOSIS — K52.9 GASTROENTERITIS: ICD-10-CM

## 2025-02-14 DIAGNOSIS — R52 BODY ACHES: Primary | ICD-10-CM

## 2025-02-14 DIAGNOSIS — J06.9 UPPER RESPIRATORY TRACT INFECTION, UNSPECIFIED TYPE: ICD-10-CM

## 2025-02-14 LAB
SARS-COV-2 AG UPPER RESP QL IA: NEGATIVE
SL AMB POCT RAPID FLU A: NORMAL
SL AMB POCT RAPID FLU B: NORMAL
VALID CONTROL: NORMAL

## 2025-02-14 PROCEDURE — 87804 INFLUENZA ASSAY W/OPTIC: CPT

## 2025-02-14 PROCEDURE — G2211 COMPLEX E/M VISIT ADD ON: HCPCS

## 2025-02-14 PROCEDURE — 99213 OFFICE O/P EST LOW 20 MIN: CPT

## 2025-02-14 PROCEDURE — 87811 SARS-COV-2 COVID19 W/OPTIC: CPT

## 2025-02-14 RX ORDER — FLUTICASONE PROPIONATE 50 MCG
2 SPRAY, SUSPENSION (ML) NASAL DAILY PRN
Qty: 15.8 ML | Refills: 0 | Status: SHIPPED | OUTPATIENT
Start: 2025-02-14

## 2025-02-17 ENCOUNTER — HOSPITAL ENCOUNTER (INPATIENT)
Facility: HOSPITAL | Age: 65
LOS: 4 days | Discharge: HOME | DRG: 386 | End: 2025-02-21
Attending: EMERGENCY MEDICINE | Admitting: INTERNAL MEDICINE
Payer: MEDICARE

## 2025-02-17 ENCOUNTER — APPOINTMENT (EMERGENCY)
Dept: CT IMAGING | Facility: HOSPITAL | Age: 65
DRG: 386 | End: 2025-02-17
Payer: MEDICARE

## 2025-02-17 ENCOUNTER — APPOINTMENT (EMERGENCY)
Dept: RADIOLOGY | Facility: HOSPITAL | Age: 65
DRG: 386 | End: 2025-02-17
Payer: MEDICARE

## 2025-02-17 DIAGNOSIS — N18.30 STAGE 3 CHRONIC KIDNEY DISEASE, UNSPECIFIED WHETHER STAGE 3A OR 3B CKD (HCC): ICD-10-CM

## 2025-02-17 DIAGNOSIS — R19.7 DIARRHEA, UNSPECIFIED TYPE: ICD-10-CM

## 2025-02-17 DIAGNOSIS — I10 HTN (HYPERTENSION): ICD-10-CM

## 2025-02-17 DIAGNOSIS — I10 PRIMARY HYPERTENSION: ICD-10-CM

## 2025-02-17 DIAGNOSIS — N17.9 AKI (ACUTE KIDNEY INJURY) (HCC): ICD-10-CM

## 2025-02-17 DIAGNOSIS — R53.1 GENERALIZED WEAKNESS: ICD-10-CM

## 2025-02-17 DIAGNOSIS — K52.9 COLITIS: Primary | ICD-10-CM

## 2025-02-17 PROBLEM — E87.20 METABOLIC ACIDOSIS: Status: ACTIVE | Noted: 2025-02-17

## 2025-02-17 PROBLEM — N18.4 ACUTE KIDNEY INJURY SUPERIMPOSED ON STAGE 4 CHRONIC KIDNEY DISEASE (HCC): Status: ACTIVE | Noted: 2017-01-17

## 2025-02-17 LAB
2HR DELTA HS TROPONIN: -1 NG/L
ALBUMIN SERPL BCG-MCNC: 3.9 G/DL (ref 3.5–5)
ALP SERPL-CCNC: 109 U/L (ref 34–104)
ALT SERPL W P-5'-P-CCNC: 16 U/L (ref 7–52)
ANION GAP SERPL CALCULATED.3IONS-SCNC: 8 MMOL/L (ref 4–13)
AST SERPL W P-5'-P-CCNC: 21 U/L (ref 13–39)
BACTERIA UR QL AUTO: NORMAL /HPF
BASOPHILS # BLD AUTO: 0.08 THOUSANDS/ΜL (ref 0–0.1)
BASOPHILS NFR BLD AUTO: 1 % (ref 0–1)
BILIRUB SERPL-MCNC: 0.35 MG/DL (ref 0.2–1)
BILIRUB UR QL STRIP: NEGATIVE
BUN SERPL-MCNC: 29 MG/DL (ref 5–25)
CALCIUM SERPL-MCNC: 9.5 MG/DL (ref 8.4–10.2)
CARDIAC TROPONIN I PNL SERPL HS: 7 NG/L (ref ?–50)
CARDIAC TROPONIN I PNL SERPL HS: 8 NG/L (ref ?–50)
CHLORIDE SERPL-SCNC: 111 MMOL/L (ref 96–108)
CK SERPL-CCNC: 103 U/L (ref 26–192)
CLARITY UR: CLEAR
CO2 SERPL-SCNC: 20 MMOL/L (ref 21–32)
COLOR UR: YELLOW
CREAT SERPL-MCNC: 3.44 MG/DL (ref 0.6–1.3)
EOSINOPHIL # BLD AUTO: 0.27 THOUSAND/ΜL (ref 0–0.61)
EOSINOPHIL NFR BLD AUTO: 4 % (ref 0–6)
ERYTHROCYTE [DISTWIDTH] IN BLOOD BY AUTOMATED COUNT: 12.2 % (ref 11.6–15.1)
FLUAV RNA RESP QL NAA+PROBE: NEGATIVE
FLUBV RNA RESP QL NAA+PROBE: NEGATIVE
GFR SERPL CREATININE-BSD FRML MDRD: 13 ML/MIN/1.73SQ M
GLUCOSE SERPL-MCNC: 112 MG/DL (ref 65–140)
GLUCOSE SERPL-MCNC: 117 MG/DL (ref 65–140)
GLUCOSE UR STRIP-MCNC: NEGATIVE MG/DL
HCT VFR BLD AUTO: 38.8 % (ref 34.8–46.1)
HGB BLD-MCNC: 12.3 G/DL (ref 11.5–15.4)
HGB UR QL STRIP.AUTO: NEGATIVE
IMM GRANULOCYTES # BLD AUTO: 0.03 THOUSAND/UL (ref 0–0.2)
IMM GRANULOCYTES NFR BLD AUTO: 1 % (ref 0–2)
KETONES UR STRIP-MCNC: NEGATIVE MG/DL
LACTATE SERPL-SCNC: 0.3 MMOL/L (ref 0.5–2)
LEUKOCYTE ESTERASE UR QL STRIP: ABNORMAL
LIPASE SERPL-CCNC: 33 U/L (ref 11–82)
LYMPHOCYTES # BLD AUTO: 1.43 THOUSANDS/ΜL (ref 0.6–4.47)
LYMPHOCYTES NFR BLD AUTO: 22 % (ref 14–44)
MAGNESIUM SERPL-MCNC: 2 MG/DL (ref 1.9–2.7)
MCH RBC QN AUTO: 32 PG (ref 26.8–34.3)
MCHC RBC AUTO-ENTMCNC: 31.7 G/DL (ref 31.4–37.4)
MCV RBC AUTO: 101 FL (ref 82–98)
MONOCYTES # BLD AUTO: 0.57 THOUSAND/ΜL (ref 0.17–1.22)
MONOCYTES NFR BLD AUTO: 9 % (ref 4–12)
NEUTROPHILS # BLD AUTO: 4.02 THOUSANDS/ΜL (ref 1.85–7.62)
NEUTS SEG NFR BLD AUTO: 63 % (ref 43–75)
NITRITE UR QL STRIP: NEGATIVE
NON-SQ EPI CELLS URNS QL MICRO: NORMAL /HPF
NRBC BLD AUTO-RTO: 0 /100 WBCS
PH UR STRIP.AUTO: 6.5 [PH]
PLATELET # BLD AUTO: 204 THOUSANDS/UL (ref 149–390)
PMV BLD AUTO: 10.8 FL (ref 8.9–12.7)
POTASSIUM SERPL-SCNC: 3.5 MMOL/L (ref 3.5–5.3)
PROT SERPL-MCNC: 6.5 G/DL (ref 6.4–8.4)
PROT UR STRIP-MCNC: NEGATIVE MG/DL
RBC # BLD AUTO: 3.84 MILLION/UL (ref 3.81–5.12)
RBC #/AREA URNS AUTO: NORMAL /HPF
RSV RNA RESP QL NAA+PROBE: NEGATIVE
SARS-COV-2 RNA RESP QL NAA+PROBE: NEGATIVE
SODIUM SERPL-SCNC: 139 MMOL/L (ref 135–147)
SP GR UR STRIP.AUTO: <1.005 (ref 1–1.03)
UROBILINOGEN UR STRIP-ACNC: <2 MG/DL
WBC # BLD AUTO: 6.4 THOUSAND/UL (ref 4.31–10.16)
WBC #/AREA URNS AUTO: NORMAL /HPF

## 2025-02-17 PROCEDURE — 83690 ASSAY OF LIPASE: CPT

## 2025-02-17 PROCEDURE — 81001 URINALYSIS AUTO W/SCOPE: CPT

## 2025-02-17 PROCEDURE — 82550 ASSAY OF CK (CPK): CPT

## 2025-02-17 PROCEDURE — 96361 HYDRATE IV INFUSION ADD-ON: CPT

## 2025-02-17 PROCEDURE — 99285 EMERGENCY DEPT VISIT HI MDM: CPT

## 2025-02-17 PROCEDURE — 71045 X-RAY EXAM CHEST 1 VIEW: CPT

## 2025-02-17 PROCEDURE — 83605 ASSAY OF LACTIC ACID: CPT

## 2025-02-17 PROCEDURE — 83735 ASSAY OF MAGNESIUM: CPT

## 2025-02-17 PROCEDURE — 93005 ELECTROCARDIOGRAM TRACING: CPT

## 2025-02-17 PROCEDURE — 99223 1ST HOSP IP/OBS HIGH 75: CPT | Performed by: INTERNAL MEDICINE

## 2025-02-17 PROCEDURE — 96374 THER/PROPH/DIAG INJ IV PUSH: CPT

## 2025-02-17 PROCEDURE — 74176 CT ABD & PELVIS W/O CONTRAST: CPT

## 2025-02-17 PROCEDURE — 82948 REAGENT STRIP/BLOOD GLUCOSE: CPT

## 2025-02-17 PROCEDURE — 0241U HB NFCT DS VIR RESP RNA 4 TRGT: CPT

## 2025-02-17 PROCEDURE — 84484 ASSAY OF TROPONIN QUANT: CPT

## 2025-02-17 PROCEDURE — 87505 NFCT AGENT DETECTION GI: CPT

## 2025-02-17 PROCEDURE — 36415 COLL VENOUS BLD VENIPUNCTURE: CPT

## 2025-02-17 PROCEDURE — 80053 COMPREHEN METABOLIC PANEL: CPT

## 2025-02-17 PROCEDURE — 80175 DRUG SCREEN QUAN LAMOTRIGINE: CPT

## 2025-02-17 PROCEDURE — 85025 COMPLETE CBC W/AUTO DIFF WBC: CPT

## 2025-02-17 RX ORDER — AMILORIDE HYDROCHLORIDE 5 MG/1
5 TABLET ORAL 2 TIMES DAILY
Status: DISCONTINUED | OUTPATIENT
Start: 2025-02-17 | End: 2025-02-20

## 2025-02-17 RX ORDER — HEPARIN SODIUM 5000 [USP'U]/ML
5000 INJECTION, SOLUTION INTRAVENOUS; SUBCUTANEOUS EVERY 8 HOURS SCHEDULED
Status: DISCONTINUED | OUTPATIENT
Start: 2025-02-17 | End: 2025-02-21 | Stop reason: HOSPADM

## 2025-02-17 RX ORDER — FLUVOXAMINE MALEATE 50 MG
200 TABLET ORAL
Status: DISCONTINUED | OUTPATIENT
Start: 2025-02-17 | End: 2025-02-21 | Stop reason: HOSPADM

## 2025-02-17 RX ORDER — ACETAMINOPHEN 325 MG/1
650 TABLET ORAL EVERY 6 HOURS PRN
Status: DISCONTINUED | OUTPATIENT
Start: 2025-02-17 | End: 2025-02-21 | Stop reason: HOSPADM

## 2025-02-17 RX ORDER — FERROUS SULFATE 325(65) MG
325 TABLET ORAL
Status: DISCONTINUED | OUTPATIENT
Start: 2025-02-18 | End: 2025-02-21 | Stop reason: HOSPADM

## 2025-02-17 RX ORDER — ASPIRIN 81 MG/1
81 TABLET, CHEWABLE ORAL DAILY
Status: DISCONTINUED | OUTPATIENT
Start: 2025-02-18 | End: 2025-02-21 | Stop reason: HOSPADM

## 2025-02-17 RX ORDER — ALBUTEROL SULFATE 90 UG/1
2 INHALANT RESPIRATORY (INHALATION) EVERY 6 HOURS PRN
Status: DISCONTINUED | OUTPATIENT
Start: 2025-02-17 | End: 2025-02-21 | Stop reason: HOSPADM

## 2025-02-17 RX ORDER — MONTELUKAST SODIUM 10 MG/1
10 TABLET ORAL
Status: DISCONTINUED | OUTPATIENT
Start: 2025-02-17 | End: 2025-02-21 | Stop reason: HOSPADM

## 2025-02-17 RX ORDER — SODIUM CHLORIDE, SODIUM LACTATE, POTASSIUM CHLORIDE, CALCIUM CHLORIDE 600; 310; 30; 20 MG/100ML; MG/100ML; MG/100ML; MG/100ML
100 INJECTION, SOLUTION INTRAVENOUS CONTINUOUS
Status: DISCONTINUED | OUTPATIENT
Start: 2025-02-17 | End: 2025-02-18

## 2025-02-17 RX ORDER — AMLODIPINE BESYLATE 5 MG/1
5 TABLET ORAL DAILY
Status: DISCONTINUED | OUTPATIENT
Start: 2025-02-18 | End: 2025-02-21 | Stop reason: HOSPADM

## 2025-02-17 RX ORDER — BUDESONIDE AND FORMOTEROL FUMARATE DIHYDRATE 160; 4.5 UG/1; UG/1
2 AEROSOL RESPIRATORY (INHALATION) 2 TIMES DAILY
Status: DISCONTINUED | OUTPATIENT
Start: 2025-02-17 | End: 2025-02-21 | Stop reason: HOSPADM

## 2025-02-17 RX ORDER — CARVEDILOL 25 MG/1
25 TABLET ORAL 2 TIMES DAILY WITH MEALS
Status: DISCONTINUED | OUTPATIENT
Start: 2025-02-18 | End: 2025-02-20

## 2025-02-17 RX ORDER — ONDANSETRON 2 MG/ML
4 INJECTION INTRAMUSCULAR; INTRAVENOUS ONCE
Status: COMPLETED | OUTPATIENT
Start: 2025-02-17 | End: 2025-02-17

## 2025-02-17 RX ORDER — CALCIUM CARBONATE 500 MG/1
500 TABLET, CHEWABLE ORAL DAILY PRN
Status: DISCONTINUED | OUTPATIENT
Start: 2025-02-17 | End: 2025-02-18

## 2025-02-17 RX ORDER — SODIUM BICARBONATE 650 MG/1
650 TABLET ORAL
Status: DISCONTINUED | OUTPATIENT
Start: 2025-02-17 | End: 2025-02-17

## 2025-02-17 RX ORDER — ATORVASTATIN CALCIUM 40 MG/1
40 TABLET, FILM COATED ORAL
Status: DISCONTINUED | OUTPATIENT
Start: 2025-02-17 | End: 2025-02-21 | Stop reason: HOSPADM

## 2025-02-17 RX ORDER — FLUVOXAMINE MALEATE 50 MG
100 TABLET ORAL DAILY
Status: DISCONTINUED | OUTPATIENT
Start: 2025-02-18 | End: 2025-02-21 | Stop reason: HOSPADM

## 2025-02-17 RX ORDER — ONDANSETRON 2 MG/ML
4 INJECTION INTRAMUSCULAR; INTRAVENOUS EVERY 4 HOURS PRN
Status: DISCONTINUED | OUTPATIENT
Start: 2025-02-17 | End: 2025-02-21 | Stop reason: HOSPADM

## 2025-02-17 RX ORDER — DICYCLOMINE HYDROCHLORIDE 10 MG/1
10 CAPSULE ORAL
Status: DISCONTINUED | OUTPATIENT
Start: 2025-02-17 | End: 2025-02-21 | Stop reason: HOSPADM

## 2025-02-17 RX ORDER — DICYCLOMINE HCL 20 MG
20 TABLET ORAL ONCE
Status: COMPLETED | OUTPATIENT
Start: 2025-02-17 | End: 2025-02-17

## 2025-02-17 RX ORDER — POTASSIUM CHLORIDE 1500 MG/1
40 TABLET, EXTENDED RELEASE ORAL ONCE
Status: COMPLETED | OUTPATIENT
Start: 2025-02-17 | End: 2025-02-17

## 2025-02-17 RX ORDER — SODIUM BICARBONATE 650 MG/1
1300 TABLET ORAL
Status: DISCONTINUED | OUTPATIENT
Start: 2025-02-17 | End: 2025-02-18

## 2025-02-17 RX ORDER — QUETIAPINE FUMARATE 100 MG/1
100 TABLET, FILM COATED ORAL DAILY
Status: DISCONTINUED | OUTPATIENT
Start: 2025-02-18 | End: 2025-02-21 | Stop reason: HOSPADM

## 2025-02-17 RX ORDER — PANTOPRAZOLE SODIUM 40 MG/1
40 TABLET, DELAYED RELEASE ORAL
Status: DISCONTINUED | OUTPATIENT
Start: 2025-02-18 | End: 2025-02-21 | Stop reason: HOSPADM

## 2025-02-17 RX ORDER — CLONAZEPAM 0.5 MG/1
0.5 TABLET ORAL 2 TIMES DAILY PRN
Status: DISCONTINUED | OUTPATIENT
Start: 2025-02-17 | End: 2025-02-21 | Stop reason: HOSPADM

## 2025-02-17 RX ORDER — LAMOTRIGINE 100 MG/1
200 TABLET ORAL 2 TIMES DAILY
Status: DISCONTINUED | OUTPATIENT
Start: 2025-02-17 | End: 2025-02-21 | Stop reason: HOSPADM

## 2025-02-17 RX ADMIN — DICYCLOMINE HYDROCHLORIDE 20 MG: 20 TABLET ORAL at 10:48

## 2025-02-17 RX ADMIN — SODIUM BICARBONATE 650 MG TABLET 1300 MG: at 17:43

## 2025-02-17 RX ADMIN — ONDANSETRON 4 MG: 2 INJECTION INTRAMUSCULAR; INTRAVENOUS at 10:48

## 2025-02-17 RX ADMIN — DICYCLOMINE HYDROCHLORIDE 10 MG: 10 CAPSULE ORAL at 21:50

## 2025-02-17 RX ADMIN — Medication 5000 UNITS: at 16:19

## 2025-02-17 RX ADMIN — SODIUM CHLORIDE 1000 ML: 0.9 INJECTION, SOLUTION INTRAVENOUS at 10:44

## 2025-02-17 RX ADMIN — ATORVASTATIN CALCIUM 40 MG: 40 TABLET, FILM COATED ORAL at 16:19

## 2025-02-17 RX ADMIN — HEPARIN SODIUM 5000 UNITS: 5000 INJECTION INTRAVENOUS; SUBCUTANEOUS at 21:50

## 2025-02-17 RX ADMIN — POTASSIUM CHLORIDE 40 MEQ: 1500 TABLET, EXTENDED RELEASE ORAL at 16:18

## 2025-02-17 RX ADMIN — FLUVOXAMINE MALEATE 200 MG: 50 TABLET ORAL at 21:50

## 2025-02-17 RX ADMIN — SODIUM CHLORIDE, SODIUM LACTATE, POTASSIUM CHLORIDE, AND CALCIUM CHLORIDE 75 ML/HR: .6; .31; .03; .02 INJECTION, SOLUTION INTRAVENOUS at 14:26

## 2025-02-17 RX ADMIN — DICYCLOMINE HYDROCHLORIDE 10 MG: 10 CAPSULE ORAL at 16:19

## 2025-02-17 RX ADMIN — MONTELUKAST 10 MG: 10 TABLET, FILM COATED ORAL at 21:51

## 2025-02-17 RX ADMIN — LAMOTRIGINE 200 MG: 100 TABLET ORAL at 17:43

## 2025-02-17 RX ADMIN — QUETIAPINE 500 MG: 200 TABLET ORAL at 21:50

## 2025-02-17 NOTE — H&P
H&P - Hospitalist   Name: Ayleen Moralez 64 y.o. female I MRN: [id number]  Unit/Bed#: [facility]01 I Date of Admission: 2/17/2025   Date of Service: 2/17/2025 I Hospital Day: 0     Assessment & Plan  Acute kidney injury superimposed on stage 4 chronic kidney disease (HCC)  Lab Results   Component Value Date    EGFR 13 02/17/2025    EGFR 13 02/10/2025    EGFR 18 01/21/2025    CREATININE 3.44 (H) 02/17/2025    CREATININE 3.36 (H) 02/10/2025    CREATININE 2.63 (H) 01/21/2025     Presented with GI illness x 1 week, likely viral in nature with associated poor p.o. intake  Creatinine baseline 2.5 (however notably was 3.36 on 2/10)  Creatinine on admission 3.44  UA bland  CK pending  PVR - no retention   Retention protocol + increase hold parameters for antihypertensives  Hold amiloride  Consult nephrology  Monitor I's/O  Avoid nephrotoxins and hypotension  Gentle fluid resuscitation  Essential (primary) hypertension  Home regimen: Amlodipine 5 mg daily, Coreg 25 mg twice daily, amiloride 5 mg twice daily  Hold Amoride given CAM  Continue amlodipine/Coreg with increased hold parameters given CAM  SBP currently stable  Moderate persistent asthma, uncomplicated  Not in acute exacerbation  Continue home inhalers  Continue home Singulair  Anxiety disorder, unspecified  Continue home Seroquel, Luvox, Lamictal, Klonopin  PDMP reviewed, 270 tabs 0.5 mg klonopin filled 1/7 x 90 days   Lamictal level pending  Hypokalemia    Diarrhea  Patient was seen by PCP on 2/14 for general malaise, arthralgias, nausea/vomiting, diarrhea x 1 week  Not meeting SIRS/sepsis  Suspected viral etiology  CT A/P: Mild left-sided colitis.  Stool studies pending  Supportive care and IV fluid  Advance diet as tolerated  Monitor off antibiotics  Metabolic acidosis  Bicarb 20  Sodium bicarb tablets ordered, continue LR  Lactic acid pending  Monitor BMP tomorrow      VTE Pharmacologic Prophylaxis:   Moderate Risk (Score 3-4) - Pharmacological DVT Prophylaxis  Ordered: heparin.  Code Status: Level 1 - Full Code   Discussion with family: Patient declined call to .     Anticipated Length of Stay: Patient will be admitted on an inpatient basis with an anticipated length of stay of greater than 2 midnights secondary to CAM.    History of Present Illness   Chief Complaint: Diarrhea, nausea/vomiting    Ayleen Moralez is a 64 y.o. female with a PMH of hypertension, hyperlipidemia, asthma, anxiety, vitamin D deficiency, osteoporosis, CKD who presents with nausea/vomiting, diarrhea over the past 1 week.  Patient denies known fever/has not taken her temperature at home but reports associated generalized malaise/chills/fatigue and minimal p.o. intake.  She reports she continues to have 10-12 episodes of loose stool/diarrhea daily with generalized abdominal cramping however no focal abdominal pain or blood in stool.  She denies any urinary symptoms, cough, shortness of breath, chest pain, palpitations or other complaints at this time.  Last episode of vomiting was this morning.    Review of Systems   Constitutional:  Positive for activity change, appetite change, chills and fatigue. Negative for fever.   HENT:  Negative for congestion, rhinorrhea and sore throat.    Eyes:  Negative for visual disturbance.   Respiratory:  Negative for cough, chest tightness, shortness of breath and wheezing.    Cardiovascular:  Negative for chest pain, palpitations and leg swelling.   Gastrointestinal:  Positive for abdominal pain, diarrhea, nausea and vomiting. Negative for anal bleeding, blood in stool and constipation.   Genitourinary:  Negative for difficulty urinating, dysuria, frequency and urgency.   Musculoskeletal:  Negative for arthralgias, back pain and myalgias.   Skin:  Negative for rash and wound.   Neurological:  Negative for dizziness, light-headedness and headaches.   All other systems reviewed and are negative.      Historical Information   Past Medical History:    Diagnosis Date    Aftercare following left knee joint replacement surgery 02/15/2024    Anxiety     Anxiety disorder     Arthritis     At risk for falls     Bipolar 2 disorder (HCC)     Chronic back pain     Chronic kidney disease     Closed fracture of distal end of right fibula with routine healing 2020    COVID-19     in 2021    CVA (cerebral vascular accident) (HCC)     noted on MRI in the past    Depression     GERD (gastroesophageal reflux disease)     Hypercholesteremia     Hypernatremia     Hypertension     Hypokalemia     Idiopathic chronic pancreatitis (HCC) 2018    Intervertebral disc disorder with radiculopathy of lumbosacral region     resolved: 2015    Limb alert care status     LUE-fistula    Panic attacks     Pericardial effusion     PONV (postoperative nausea and vomiting)     Psychiatric problem     Radiculitis     resolved: 2015    Secondary renal hyperparathyroidism (HCC)     Stroke (HCC)     Vitamin D deficiency      Past Surgical History:   Procedure Laterality Date    BUNIONECTOMY      Left foot     CAST APPLICATION Right 2022    Procedure: Application short-arm thumb spica splint;  Surgeon: Lyndon Victoria MD;  Location: [facility];  Service: Orthopedics    COLON SURGERY      COLONOSCOPY  2018    COLONOSCOPY  2021    DILATION AND CURETTAGE OF UTERUS      INDUCED       surgically induced    CO ARTERIOVENOUS ANASTOMOSIS OPEN DIRECT Left 2019    Procedure: CREATION FISTULA ARTERIOVENOUS (AV) left wrists possible left upper;  Surgeon: Andrey Quintero MD;  Location: [facility];  Service: Vascular    CO ARTHRP KNE CONDYLE&PLATU MEDIAL&LAT COMPARTMENTS Left 2024    Procedure: ARTHROPLASTY KNEE TOTAL SAME DAY;  Surgeon: Riki Ma MD;  Location: [facility];  Service: Orthopedics    CO CORRJ HLX VLGS BNCTY SESMDC DSTL METAR OSTEOT Left 2019    Procedure: Serge bunionectomy;  Surgeon: Munir Larkin DPM;  [facility];  Service: Podiatry    NY CORRJ HLX VLGS BNCTY [facility] DSTL METAR OSTEOT Right 08/03/2020    Procedure: BUNIONECTOMY RAFAEL;  Surgeon: Munir Larkin DPM;  Location: [facility];  Service: Podiatry    NY CORRJ HLX VLGS BNCTY SESMDC PROX PHLX OSTEOT Right 09/27/2021    Procedure: BUNIONECTOMY TAMEKA, right tameka osteotomy and 2nd claw toe correction;  Surgeon: James R Lachman, MD;  Location: [facility];  Service: Orthopedics    NY ERCP DX COLLECTION SPECIMEN BRUSHING/WASHING N/A 04/11/2018    Procedure: ENDOSCOPIC RETROGRADE CHOLANGIOPANCREATOGRAPHY (ERCP);  Surgeon: Alfredo Messina MD;  [facility];  Service: Gastroenterology    NY LAPAROSCOPY PROCTOPEXY PROLAPSE N/A 07/13/2018    Procedure: ROBOTIC SIGMOID RESECTION / RECTOPEXY;  Surgeon: ELPIDIO Mcnulty MD;  [facility]: [facility];  Service: Colorectal    NY OPEN TREATMENT RADIAL SHAFT FRACTURE W/INT FIXJ Right 10/11/2021    Procedure: OPEN REDUCTION W/ INTERNAL FIXATION (ORIF) RADIUS (WRIST), RIGHT DISTAL;  Surgeon: Riki Ma MD;  Location: [facility];  Service: Orthopedics    NY REMOVAL IMPLANT DEEP Right 06/23/2022    Procedure: Removal of hardware volar aspect right distal radius (distal radial plate and screws);  Surgeon: Lyndon Victoria MD;  Location: [facility];  Service: Orthopedics    NY SIGMOIDOSCOPY FLX DX W/COLLJ SPEC BR/WA IF PFRMD N/A 07/13/2018    Procedure: SIGMOIDOSCOPY FLEXIBLE;  Surgeon: ELPIDIO Mcnulty MD;  [facility];  Service: Colorectal    NY TR TDN RESTORE INTRNSC FUNCJ ALL 4 FNGRS Right 06/23/2022    Procedure: Right ring finger flexor digitorum superficialis to flexor pollicis longus tendon transfer;  Surgeon: Lyndon Victoria MD;  Location: [facility];  Service: Orthopedics    TUBAL LIGATION Bilateral 1997    US GUIDED THYROID BIOPSY  07/30/2019     Social History     Tobacco Use    Smoking status: Never    Smokeless tobacco: Never   Vaping Use    Vaping status: Never Used   Substance and Sexual Activity     Alcohol use: Not Currently    Drug use: Not Currently     Types: Hydrocodone, Marijuana     Comment: MEDICATION   Ayleen has h/o marijuana abuse- not currently    Sexual activity: Not Currently     Partners: Male     Birth control/protection: Post-menopausal     E-Cigarette/Vaping    E-Cigarette Use Never User      E-Cigarette/Vaping Substances    Nicotine No     THC No     CBD No     Flavoring No     Other No     Unknown No      Family history non-contributory  Social History:  Marital Status:    Occupation: not assessed  Patient Pre-hospital Living Situation: Home, With other family member: sister  Patient Pre-hospital Level of Mobility: unable to be assessed at time of evaluation  Patient Pre-hospital Diet Restrictions: none    Meds/Allergies   I have reviewed home medications with patient personally.  Prior to Admission medications    Medication Sig Start Date End Date Taking? Authorizing Provider   acetaminophen (TYLENOL) 500 mg tablet Take 2 tablets (1,000 mg total) by mouth every 8 (eight) hours 2/5/24   Yari Wallace PA-C   albuterol (Ventolin HFA) 90 mcg/act inhaler Inhale 2 puffs every 6 (six) hours as needed for wheezing or shortness of breath 4/27/23   Bette Fly, DO   AMILoride 5 mg tablet TAKE 1 TABLET BY MOUTH TWICE  DAILY 2/6/25   Bette Fly, DO   amLODIPine (NORVASC) 5 mg tablet Take 1 tablet (5 mg total) by mouth daily 9/6/24   Bette Fly, DO   ascorbic acid (VITAMIN C) 500 MG tablet Take 1 tablet (500 mg total) by mouth 2 (two) times a day 1/18/24   Yari Wallace PA-C   aspirin 81 mg chewable tablet Chew 1 tablet (81 mg total) 2 (two) times a day 2/5/24   Yari Wallace PA-C   budesonide-formoterol (Symbicort) 160-4.5 mcg/act inhaler Inhale 2 puffs 2 (two) times a day Rinse mouth after use. 8/10/23   Mylene Hare,    calcium carbonate (Calcium 600) 600 MG tablet Take 600 mg by mouth 2 (two) times a day with meals    Historical Provider, MD   carvedilol (COREG) 25 mg tablet TAKE 1 TABLET BY  MOUTH TWICE  DAILY WITH MEALS 10/7/24   Arturo Mills, DO   cholecalciferol (VITAMIN D3) 1,000 units tablet Take 5 tablets (5,000 Units total) by mouth daily 22   Bette Harp DO   clonazePAM (KlonoPIN) 0.5 mg tablet Take 1 tablet (0.5 mg total) by mouth 3 (three) times a day Do not start before 2025. 25   Bette Harp DO   Cyanocobalamin (VITAMIN B 12 PO) Take 1,000 mcg by mouth in the morning    Historical Provider, MD   denosumab (XGEVA) 120 mg/1.7 mL Inject 120 mg under the skin    Historical Provider, MD   dextromethorphan-guaifenesin (MUCINEX DM)  MG per 12 hr tablet Take 1 tablet by mouth every 12 (twelve) hours 25   Timbo Coppola MD   ferrous sulfate 324 (65 Fe) mg Take 1 tablet (324 mg total) by mouth 2 (two) times a day before meals  Patient taking differently: Take 324 mg by mouth daily before breakfast 24   Yari Wallace PA-C   fluticasone (FLONASE) 50 mcg/act nasal spray 2 sprays into each nostril daily as needed for rhinitis (congestion) 25   Timbo Coppola MD   fluvoxaMINE (LUVOX) 100 mg tablet Fluvoxamine 100m tablet in the morning ; 2 tablets at night 25   EMERITA Lynn   lamoTRIgine (LaMICtal) 200 MG tablet Lamotrigine 200m tablet in the morning , 1 tablet at night 25   EMERITA Lynn   montelukast (SINGULAIR) 10 mg tablet Take 1 tablet (10 mg total) by mouth daily at bedtime 24   Bette Harp DO   Multiple Vitamin (MULTI-VITAMIN) tablet Take 1 tablet by mouth daily 24   Yari Wallace PA-C   omeprazole (PriLOSEC) 40 MG capsule TAKE 1 CAPSULE BY MOUTH DAILY  BEFORE BREAKFAST 24   Yanna Barba DO   ondansetron (ZOFRAN) 4 mg tablet Take 1 tablet (4 mg total) by mouth every 8 (eight) hours as needed for nausea or vomiting 24   Bette Harp DO   QUEtiapine (SEROquel) 100 mg tablet Take 1 tablet (100 mg total) by mouth in the morning 25   EMERITA Lynn   QUEtiapine (SEROquel) 300 mg tablet  Take 1 tablet (300 mg total) by mouth in the morning 1/16/25   EMERITA Lynn   QUEtiapine (SEROquel) 400 MG tablet Take 1 tablet (400 mg total) by mouth daily at bedtime Take with the Seroquel 100 mg to total 500 mg daily at bedtime 1/16/25   EMERITA Lynn   rosuvastatin (CRESTOR) 20 MG tablet TAKE 1 TABLET BY MOUTH IN THE  EVENING 12/2/24   Yanna Barba DO   traMADol (ULTRAM) 50 mg tablet Take 1 tablet (50 mg total) by mouth 2 (two) times a day as needed for moderate pain 2/4/25   Bette Fly, DO     Allergies   Allergen Reactions    Molds & Smuts Nasal Congestion    Bee Pollen Nasal Congestion     Other reaction(s): Nasal Congestion    Pollen Extract Nasal Congestion       Objective :  Temp:  [98.3 °F (36.8 °C)] 98.3 °F (36.8 °C)  HR:  [68-79] 79  BP: (123-162)/(69-83) 130/69  Resp:  [18-19] 18  SpO2:  [93 %-97 %] 93 %  O2 Device: None (Room air)    Physical Exam  Vitals and nursing note reviewed.   Constitutional:       General: She is not in acute distress.     Appearance: She is well-developed. She is ill-appearing.   HENT:      Head: Normocephalic and atraumatic.   Eyes:      General:         Right eye: No discharge.         Left eye: No discharge.      Extraocular Movements: Extraocular movements intact.      Conjunctiva/sclera: Conjunctivae normal.   Cardiovascular:      Rate and Rhythm: Normal rate and regular rhythm.      Heart sounds: Murmur heard.   Pulmonary:      Effort: Pulmonary effort is normal. No respiratory distress.      Breath sounds: Normal breath sounds. No wheezing, rhonchi or rales.   Abdominal:      General: Bowel sounds are normal. There is no distension.      Palpations: Abdomen is soft.      Tenderness: There is abdominal tenderness.      Comments: Belly is soft, generalized tenderness to palpation throughout   Musculoskeletal:      Cervical back: Neck supple.      Right lower leg: No edema.      Left lower leg: No edema.   Skin:     General: Skin is warm and dry.       Capillary Refill: Capillary refill takes less than 2 seconds.   Neurological:      General: No focal deficit present.      Mental Status: She is alert and oriented to person, place, and time. Mental status is at baseline.      Cranial Nerves: No cranial nerve deficit.   Psychiatric:         Mood and Affect: Mood normal.         Behavior: Behavior normal.          Lines/Drains:            Lab Results: I have reviewed the following results:  Results from last 7 days   Lab Units 02/17/25  1044   WBC Thousand/uL 6.40   HEMOGLOBIN g/dL 12.3   HEMATOCRIT % 38.8   PLATELETS Thousands/uL 204   SEGS PCT % 63   LYMPHO PCT % 22   MONO PCT % 9   EOS PCT % 4     Results from last 7 days   Lab Units 02/17/25  1044   SODIUM mmol/L 139   POTASSIUM mmol/L 3.5   CHLORIDE mmol/L 111*   CO2 mmol/L 20*   BUN mg/dL 29*   CREATININE mg/dL 3.44*   ANION GAP mmol/L 8   CALCIUM mg/dL 9.5   ALBUMIN g/dL 3.9   TOTAL BILIRUBIN mg/dL 0.35   ALK PHOS U/L 109*   ALT U/L 16   AST U/L 21   GLUCOSE RANDOM mg/dL 117         Results from last 7 days   Lab Units 02/17/25  1117   POC GLUCOSE mg/dl 112     Lab Results   Component Value Date    HGBA1C 6.6 (H) 11/08/2024    HGBA1C 6.4 (H) 05/06/2024    HGBA1C 6.6 (H) 01/23/2024           Imaging Results Review: I reviewed radiology reports from this admission including: CT abdomen/pelvis.  Other Study Results Review: No additional pertinent studies reviewed.    Administrative Statements   I have spent a total time of 60 minutes in caring for this patient on the day of the visit/encounter including Diagnostic results, Documenting in the medical record, Reviewing/placing orders in the medical record (including tests, medications, and/or procedures), Obtaining or reviewing history  , and Communicating with other healthcare professionals .    ** Please Note: This note has been constructed using a voice recognition system. **

## 2025-02-17 NOTE — ASSESSMENT & PLAN NOTE
Patient was seen by PCP on 2/14 for general malaise, arthralgias, nausea/vomiting, diarrhea x 1 week  Not meeting SIRS/sepsis  Suspected viral etiology  CT A/P: Mild left-sided colitis.  Stool studies pending  Supportive care and IV fluid  Advance diet as tolerated  Monitor off antibiotics

## 2025-02-17 NOTE — ASSESSMENT & PLAN NOTE
Lab Results   Component Value Date    EGFR 13 02/17/2025    EGFR 13 02/10/2025    EGFR 18 01/21/2025    CREATININE 3.44 (H) 02/17/2025    CREATININE 3.36 (H) 02/10/2025    CREATININE 2.63 (H) 01/21/2025     Presented with GI illness x 1 week, likely viral in nature with associated poor p.o. intake  Creatinine baseline 2.5 (however notably was 3.36 on 2/10)  Creatinine on admission 3.44  UA bland  CK pending  PVR - no retention   Retention protocol + increase hold parameters for antihypertensives  Hold amiloride  Consult nephrology  Monitor I's/O  Avoid nephrotoxins and hypotension  Gentle fluid resuscitation

## 2025-02-17 NOTE — ED PROVIDER NOTES
Time reflects when diagnosis was documented in both MDM as applicable and the Disposition within this note       Time User Action Codes Description Comment    2/17/2025  1:46 PM Shugars, Radha Add [K52.9] Colitis     2/17/2025  1:46 PM Shugars, Radha Add [N17.9] CAM (acute kidney injury) (HCC)     2/17/2025  1:46 PM Shugars, Radha Add [R53.1] Generalized weakness           ED Disposition       ED Disposition   Admit    Condition   Stable    Date/Time   Mon Feb 17, 2025  1:46 PM    Comment   Case was discussed with Dr. Miguel and the patient's admission status was agreed to be Admission Status: inpatient status to the service of Dr. Miguel .               Assessment & Plan       Medical Decision Making  DDx including but not limited to: Viral illness, influenza, COVID-19, gastroenteritis, colitis, diverticulitis, CAM    The patient has had 1 week of flulike symptoms as well as nausea, vomiting, and diarrhea.  Will obtain EKG and troponin to further evaluate for ACS.  Will obtain x-ray to further evaluate for pneumonia.  Will obtain labs, UA, and CT imaging to further evaluate for electrolyte derangement, organ dysfunction, CAM, leukocytosis, anemia, and acute intra-abdominal pathology.  Will medicate with fluids and Bentyl.    Problems Addressed:  CAM (acute kidney injury) (HCC): acute illness or injury  Colitis: acute illness or injury  Generalized weakness: acute illness or injury    Amount and/or Complexity of Data Reviewed  Labs: ordered.  Radiology: ordered and independent interpretation performed. Decision-making details documented in ED Course.  ECG/medicine tests: ordered and independent interpretation performed.    Risk  OTC drugs.  Prescription drug management.  Decision regarding hospitalization.        ED Course as of 02/17/25 1354   Mon Feb 17, 2025   1322 CT abdomen pelvis wo contrast  IMPRESSION:     Mild left-sided colitis.     1333 Patient updated on results, resting comfortably, notes pain slightly  improved with Bentyl, still continues to have lower abdominal discomfort and aching.  Defers further pain medication at this time.  I discussed the finding of her CAM and plan for discussion with the inpatient team       Medications   sodium chloride 0.9 % bolus 1,000 mL (1,000 mL Intravenous New Bag 2/17/25 1044)   ondansetron (ZOFRAN) injection 4 mg (4 mg Intravenous Given 2/17/25 1048)   dicyclomine (BENTYL) tablet 20 mg (20 mg Oral Given 2/17/25 1048)       ED Risk Strat Scores                            SBIRT 22yo+      Flowsheet Row Most Recent Value   Initial Alcohol Screen: US AUDIT-C     1. How often do you have a drink containing alcohol? 0 Filed at: 02/17/2025 1106   2. How many drinks containing alcohol do you have on a typical day you are drinking?  0 Filed at: 02/17/2025 1106   3b. FEMALE Any Age, or MALE 65+: How often do you have 4 or more drinks on one occassion? 0 Filed at: 02/17/2025 1106   Audit-C Score 0 Filed at: 02/17/2025 1106   ZAYRA: How many times in the past year have you...    Used an illegal drug or used a prescription medication for non-medical reasons? Never Filed at: 02/17/2025 1106                            History of Present Illness       Chief Complaint   Patient presents with    Abdominal Pain     Pt to ED c/ and pain and dizziness x 1 week. Denies vomiting. + diarrhea.        Past Medical History:   Diagnosis Date    Aftercare following left knee joint replacement surgery 02/15/2024    Anxiety     Anxiety disorder     Arthritis     At risk for falls     Bipolar 2 disorder (HCC)     Chronic back pain     Chronic kidney disease     Closed fracture of distal end of right fibula with routine healing 11/04/2020    COVID-19     in Jan 2021    CVA (cerebral vascular accident) (HCC)     noted on MRI in the past    Depression     GERD (gastroesophageal reflux disease)     Hypercholesteremia     Hypernatremia     Hypertension     Hypokalemia     Idiopathic chronic pancreatitis (HCC)  2018    Intervertebral disc disorder with radiculopathy of lumbosacral region     resolved: 2015    Limb alert care status     LUE-fistula    Panic attacks     Pericardial effusion     PONV (postoperative nausea and vomiting)     Psychiatric problem     Radiculitis     resolved: 2015    Secondary renal hyperparathyroidism (HCC)     Stroke (HCC)     Vitamin D deficiency       Past Surgical History:   Procedure Laterality Date    BUNIONECTOMY      Left foot     CAST APPLICATION Right 2022    Procedure: Application short-arm thumb spica splint;  Surgeon: Lyndon Victoria MD;  Location: [facility];  Service: Orthopedics    COLON SURGERY      COLONOSCOPY  2018    COLONOSCOPY  2021    DILATION AND CURETTAGE OF UTERUS      INDUCED       surgically induced    AR ARTERIOVENOUS ANASTOMOSIS OPEN DIRECT Left 2019    Procedure: CREATION FISTULA ARTERIOVENOUS (AV) left wrists possible left upper;  Surgeon: Andrey Quintero MD;  [facility];  Service: Vascular    AR ARTHRP KNE CONDYLE&PLATU MEDIAL&LAT COMPARTMENTS Left 2024    Procedure: ARTHROPLASTY KNEE TOTAL SAME DAY;  Surgeon: Riki Ma MD;  [facility];  Service: Orthopedics    [facility] 2019    Procedure: Serge bunionectomy;  Surgeon: Munir Larkin DPM;  [facility];  Service: Podiatry    [facility] D[facility] METAR OSTEOT Right 2020    Procedure: BUNIONECTOMY RAFAEL;  Surgeon: Munir Larkin DPM;  [facility]: [facility];  Service: Podiatry    AR [facility] PROX PHLX OSTEOT Right 2021    Procedure: BUNIONECTOMY TAMEKA, right tameka osteotomy and 2nd claw toe correction;  Surgeon: James R Lachman, MD;  Location: [facility];  Service: Orthopedics    AR ERCP DX COLLECTION SPECIMEN BRUSHING/WASHING N/A 2018    Procedure: ENDOSCOPIC RETROGRADE CHOLANGIOPANCREATOGRAPHY (ERCP);  Surgeon: Alfredo  MD Mayuri;  [facility];  Service: Gastroenterology    VT LAPAROSCOPY PROCTOPEXY PROLAPSE N/A 07/13/2018    Procedure: ROBOTIC SIGMOID RESECTION / RECTOPEXY;  Surgeon: ELPIDIO Mcnulty MD;  [facility];  Service: Colorectal    VT OPEN TREATMENT RADIAL SHAFT FRACTURE W/INT FIXJ Right 10/11/2021    Procedure: OPEN REDUCTION W/ INTERNAL FIXATION (ORIF) RADIUS (WRIST), RIGHT DISTAL;  Surgeon: Riki Ma MD;  [facility]: [facility];  Service: Orthopedics    VT REMOVAL IMPLANT DEEP Right 06/23/2022    Procedure: Removal of hardware volar aspect right distal radius (distal radial plate and screws);  Surgeon: Lyndon Victoria MD;  Location: [facility];  Service: Orthopedics    VT SIGMOIDOSCOPY FLX DX W/COLLJ SPEC BR/WA IF PFRMD N/A 07/13/2018    Procedure: SIGMOIDOSCOPY FLEXIBLE;  Surgeon: ELPIDIO Mcnulty MD;  [facility];  Service: Colorectal    VT TR TDN RESTORE INTRNSC FUNCJ ALL 4 FNGRS Right 06/23/2022    Procedure: Right ring finger flexor digitorum superficialis to flexor pollicis longus tendon transfer;  Surgeon: Lyndon Victoria MD;  Location: [facility];  Service: Orthopedics    TUBAL LIGATION Bilateral 1997    US GUIDED THYROID BIOPSY  07/30/2019      Family History   Problem Relation Age of Onset    Bipolar disorder Mother     Mental illness Mother         depression    Stroke Mother     Dementia Mother     Colon polyps Mother     Heart disease Father     Hypertension Father     Diabetes Father     Mental illness Sister     Colon polyps Sister     Mental illness Sister     Heart disease Sister     No Known Problems Sister     Breast cancer Sister 68    No Known Problems Maternal Grandmother     No Known Problems Maternal Grandfather     Breast cancer Paternal Grandmother         age unknown    No Known Problems Paternal Grandfather     Hypertension Son     Obesity Son     No Known Problems Son     Breast cancer Maternal Aunt         age unknown    Breast cancer Paternal Aunt          age unknown    Breast cancer Paternal Aunt         age unknown    Diabetes Family     Heart disease Family     Hypertension Family     Stroke Family     Thyroid disease Family     Throat cancer Niece 48    Substance Abuse Neg Hx         neg fam hx    Colon cancer Neg Hx       Social History     Tobacco Use    Smoking status: Never    Smokeless tobacco: Never   Vaping Use    Vaping status: Never Used   Substance Use Topics    Alcohol use: Not Currently    Drug use: Not Currently     Types: Hydrocodone, Marijuana     Comment: MEDICATION   Ayleen has h/o marijuana abuse- not currently      E-Cigarette/Vaping    E-Cigarette Use Never User       E-Cigarette/Vaping Substances    Nicotine No     THC No     CBD No     Flavoring No     Other No     Unknown No       I have reviewed and agree with the history as documented.     The patient is a 64-year-old female with PMH of anxiety, depression, bipolar 2 disorder, HTN, HLD, CKD, and CVA presenting for evaluation of 1 week of fatigue, lower abdominal pain, and N/V/D.  The patient started 1 week ago with generalized fatigue and weakness.  She reports over the past week having lower abdominal pain described as constant, aching/cramping, with increased urge to defecate.  She notes having nausea with intermittent vomiting, approximately 2 episodes per day (NBNB).  She reports looser than normal stools with 4-5 bowel movements per day (brown, no blood or melanotic appearance).  The patient endorses subjective fevers and chills.  She denies associated chest pain, shortness of breath, palpitations, flank pain, urinary urgency or frequency, foul-smelling urine, hematuria, and dysuria.      History provided by:  Patient   used: No    Abdominal Pain  Associated symptoms: diarrhea, fatigue, fever, nausea and vomiting    Associated symptoms: no chest pain, no chills, no cough, no shortness of breath and no sore throat        Review of Systems   Constitutional:   Positive for appetite change, fatigue and fever. Negative for chills.   HENT:  Positive for congestion. Negative for sore throat and trouble swallowing.    Respiratory:  Negative for cough and shortness of breath.    Cardiovascular:  Negative for chest pain, palpitations and leg swelling.   Gastrointestinal:  Positive for abdominal pain, diarrhea, nausea and vomiting.   Genitourinary: Negative.    Musculoskeletal:  Negative for back pain and gait problem.   Skin:  Negative for rash and wound.   Neurological:  Positive for weakness and headaches. Negative for dizziness and light-headedness.   All other systems reviewed and are negative.          Objective       ED Triage Vitals [02/17/25 1028]   Temperature Pulse Blood Pressure Respirations SpO2 Patient Position - Orthostatic VS   98.3 °F (36.8 °C) 71 123/69 19 95 % Lying      Temp Source Heart Rate Source BP Location FiO2 (%) Pain Score    Temporal Monitor Right arm -- --      Vitals      Date and Time Temp Pulse SpO2 Resp BP Pain Score FACES Pain Rating User   02/17/25 1233 -- 68 97 % 18 162/83 -- -- AM   02/17/25 1028 98.3 °F (36.8 °C) -- -- -- -- -- -- CAC   02/17/25 1028 -- 71 95 % 19 123/69 -- -- GS            Physical Exam  Vitals and nursing note reviewed.   Constitutional:       General: She is not in acute distress.     Appearance: Normal appearance. She is well-developed. She is ill-appearing (Appears fatigued and mildly ill). She is not toxic-appearing or diaphoretic.   HENT:      Head: Normocephalic and atraumatic.      Jaw: There is normal jaw occlusion.      Nose: Nose normal.      Mouth/Throat:      Lips: Pink.      Mouth: Mucous membranes are dry.   Eyes:      General: Lids are normal. Vision grossly intact. Gaze aligned appropriately.      Extraocular Movements: Extraocular movements intact.      Conjunctiva/sclera: Conjunctivae normal.      Pupils: Pupils are equal, round, and reactive to light.   Cardiovascular:      Rate and Rhythm: Normal rate  and regular rhythm.      Pulses:           Radial pulses are 2+ on the right side and 2+ on the left side.        Dorsalis pedis pulses are 2+ on the right side and 2+ on the left side.      Heart sounds: Normal heart sounds, S1 normal and S2 normal.   Pulmonary:      Effort: Pulmonary effort is normal. No respiratory distress.      Breath sounds: Normal breath sounds and air entry.   Abdominal:      General: There is no distension.      Palpations: Abdomen is soft.      Tenderness: There is abdominal tenderness in the right lower quadrant, suprapubic area and left lower quadrant. There is no guarding or rebound.   Musculoskeletal:         General: Normal range of motion.      Cervical back: Neck supple.   Skin:     General: Skin is warm and dry.      Capillary Refill: Capillary refill takes less than 2 seconds.      Findings: No rash.   Neurological:      General: No focal deficit present.      Mental Status: She is alert and oriented to person, place, and time. Mental status is at baseline.         Results Reviewed       Procedure Component Value Units Date/Time    Urine Microscopic [[id number]]  (Normal) Collected: 02/17/25 1234    Lab Status: Final result Specimen: Urine, Clean Catch Updated: 02/17/25 1314     RBC, UA None Seen /hpf      WBC, UA 0-1 /hpf      Epithelial Cells Occasional /hpf      Bacteria, UA None Seen /hpf     HS Troponin I 2hr [[id number]]  (Normal) Collected: 02/17/25 1244    Lab Status: Final result Specimen: Blood from Arm, Right Updated: 02/17/25 1310     hs TnI 2hr 7 ng/L      Delta 2hr hsTnI -1 ng/L     UA w Reflex to Microscopic w Reflex to Culture [[id number]]  (Abnormal) Collected: 02/17/25 1234    Lab Status: Final result Specimen: Urine, Clean Catch Updated: 02/17/25 1304     Color, UA Yellow     Clarity, UA Clear     Specific Gravity, UA <1.005     pH, UA 6.5     Leukocytes, UA Small     Nitrite, UA Negative     Protein, UA Negative mg/dl      Glucose, UA Negative mg/dl      Ketones,  UA Negative mg/dl      Urobilinogen, UA <2.0 mg/dl      Bilirubin, UA Negative     Occult Blood, UA Negative    HS Troponin I 4hr [[id number]]     Lab Status: No result Specimen: Blood     FLU/RSV/COVID - if FLU/RSV clinically relevant (2hr TAT) [[id number]]  (Normal) Collected: 02/17/25 1044    Lab Status: Final result Specimen: Nares from Nose Updated: 02/17/25 1127     SARS-CoV-2 Negative     INFLUENZA A PCR Negative     INFLUENZA B PCR Negative     RSV PCR Negative    Narrative:      This test has been performed using the CoV-2/Flu/RSV plus assay on the [employer] GeneXpert platform. This test has been validated by the  and verified by the performing laboratory.     This test is designed to amplify and detect the following: nucleocapsid (N), envelope (E), and RNA-dependent RNA polymerase (RdRP) genes of the SARS-CoV-2 genome; matrix (M), basic polymerase (PB2), and acidic protein (PA) segments of the influenza A genome; matrix (M) and non-structural protein (NS) segments of the influenza B genome, and the nucleocapsid genes of RSV A and RSV B.     Positive results are indicative of the presence of Flu A, Flu B, RSV, and/or SARS-CoV-2 RNA. Positive results for SARS-CoV-2 or suspected novel influenza should be reported to state, local, or federal health departments according to local reporting requirements.      All results should be assessed in conjunction with clinical presentation and other laboratory markers for clinical management.     FOR PEDIATRIC PATIENTS - copy/paste COVID Guidelines URL to browser: [email or web address]       Fingerstick Glucose (POCT) [[id number]]  (Normal) Collected: 02/17/25 1117    Lab Status: Final result Specimen: Blood Updated: 02/17/25 1118     POC Glucose 112 mg/dl     HS Troponin 0hr (reflex protocol) [[id number]]  (Normal) Collected: 02/17/25 1044    Lab Status: Final result Specimen: Blood from Arm, Right Updated:  02/17/25 1114     hs TnI 0hr 8 ng/L     Comprehensive metabolic panel [[id number]]  (Abnormal) Collected: 02/17/25 1044    Lab Status: Final result Specimen: Blood from Arm, Right Updated: 02/17/25 1108     Sodium 139 mmol/L      Potassium 3.5 mmol/L      Chloride 111 mmol/L      CO2 20 mmol/L      ANION GAP 8 mmol/L      BUN 29 mg/dL      Creatinine 3.44 mg/dL      Glucose 117 mg/dL      Calcium 9.5 mg/dL      AST 21 U/L      ALT 16 U/L      Alkaline Phosphatase 109 U/L      Total Protein 6.5 g/dL      Albumin 3.9 g/dL      Total Bilirubin 0.35 mg/dL      eGFR 13 ml/min/1.73sq m     Narrative:      National Kidney Disease Foundation guidelines for Chronic Kidney Disease (CKD):     Stage 1 with normal or high GFR (GFR > 90 mL/min/1.73 square meters)    Stage 2 Mild CKD (GFR = 60-89 mL/min/1.73 square meters)    Stage 3A Moderate CKD (GFR = 45-59 mL/min/1.73 square meters)    Stage 3B Moderate CKD (GFR = 30-44 mL/min/1.73 square meters)    Stage 4 Severe CKD (GFR = 15-29 mL/min/1.73 square meters)    Stage 5 End Stage CKD (GFR <15 mL/min/1.73 square meters)  Note: GFR calculation is accurate only with a steady state creatinine    Magnesium [[id number]]  (Normal) Collected: 02/17/25 1044    Lab Status: Final result Specimen: Blood from Arm, Right Updated: 02/17/25 1108     Magnesium 2.0 mg/dL     Lipase [[id number]]  (Normal) Collected: 02/17/25 1044    Lab Status: Final result Specimen: Blood from Arm, Right Updated: 02/17/25 1108     Lipase 33 u/L     CBC and differential [[id number]]  (Abnormal) Collected: 02/17/25 1044    Lab Status: Final result Specimen: Blood from Arm, Right Updated: 02/17/25 1051     WBC 6.40 Thousand/uL      RBC 3.84 Million/uL      Hemoglobin 12.3 g/dL      Hematocrit 38.8 %       fL      MCH 32.0 pg      MCHC 31.7 g/dL      RDW 12.2 %      MPV 10.8 fL      Platelets 204 Thousands/uL      nRBC 0 /100 WBCs      Segmented % 63 %      Immature Grans % 1 %      Lymphocytes % 22 %       Monocytes % 9 %      Eosinophils Relative 4 %      Basophils Relative 1 %      Absolute Neutrophils 4.02 Thousands/µL      Absolute Immature Grans 0.03 Thousand/uL      Absolute Lymphocytes 1.43 Thousands/µL      Absolute Monocytes 0.57 Thousand/µL      Eosinophils Absolute 0.27 Thousand/µL      Basophils Absolute 0.08 Thousands/µL             CT abdomen pelvis wo contrast   Final Interpretation by Francisco Cee MD (02/17 7095)      Mild left-sided colitis.      Workstation performed: [id number]         XR chest 1 view portable   ED Interpretation by EMERITA Lomeli (02/17 4022)   No focal consolidation identified by me.          ECG 12 Lead Documentation Only    Date/Time: 2/17/2025 11:15 AM    Performed by: EMERITA Lomeli  Authorized by: EMERITA Lomeli    Indications / Diagnosis:  Fatigue, generalized weakness  ECG reviewed by me, the ED Provider: yes    Patient location:  ED  Previous ECG:     Previous ECG:  Compared to current    Comparison ECG info:  June 25, 2024    Similarity:  No change    Comparison to cardiac monitor: Yes    Interpretation:     Interpretation: non-specific    Rate:     ECG rate:  72    ECG rate assessment: normal    Rhythm:     Rhythm: sinus rhythm    Ectopy:     Ectopy: none    QRS:     QRS axis:  Right    QRS intervals:  Wide  Conduction:     Conduction: abnormal      Abnormal conduction: complete RBBB    ST segments:     ST segments:  Non-specific  T waves:     T waves: non-specific    Comments:      Normal sinus rhythm, rightward axis, complete right bundle branch block, nonspecific EKG      ED Medication and Procedure Management   Prior to Admission Medications   Prescriptions Last Dose Informant Patient Reported? Taking?   AMILoride 5 mg tablet   No No   Sig: TAKE 1 TABLET BY MOUTH TWICE  DAILY   Cyanocobalamin (VITAMIN B 12 PO)  Self Yes No   Sig: Take 1,000 mcg by mouth in the morning   Multiple Vitamin (MULTI-VITAMIN) tablet  Self No No   Sig: Take 1 tablet by mouth  daily   QUEtiapine (SEROquel) 100 mg tablet  Self No No   Sig: Take 1 tablet (100 mg total) by mouth in the morning   QUEtiapine (SEROquel) 300 mg tablet  Self No No   Sig: Take 1 tablet (300 mg total) by mouth in the morning   QUEtiapine (SEROquel) 400 MG tablet  Self No No   Sig: Take 1 tablet (400 mg total) by mouth daily at bedtime Take with the Seroquel 100 mg to total 500 mg daily at bedtime   acetaminophen (TYLENOL) 500 mg tablet  Self No No   Sig: Take 2 tablets (1,000 mg total) by mouth every 8 (eight) hours   albuterol (Ventolin HFA) 90 mcg/act inhaler  Self No No   Sig: Inhale 2 puffs every 6 (six) hours as needed for wheezing or shortness of breath   amLODIPine (NORVASC) 5 mg tablet  Self No No   Sig: Take 1 tablet (5 mg total) by mouth daily   ascorbic acid (VITAMIN C) 500 MG tablet  Self No No   Sig: Take 1 tablet (500 mg total) by mouth 2 (two) times a day   aspirin 81 mg chewable tablet  Self No No   Sig: Chew 1 tablet (81 mg total) 2 (two) times a day   budesonide-formoterol (Symbicort) 160-4.5 mcg/act inhaler  Self No No   Sig: Inhale 2 puffs 2 (two) times a day Rinse mouth after use.   calcium carbonate (Calcium 600) 600 MG tablet  Self Yes No   Sig: Take 600 mg by mouth 2 (two) times a day with meals   carvedilol (COREG) 25 mg tablet  Self No No   Sig: TAKE 1 TABLET BY MOUTH TWICE  DAILY WITH MEALS   cholecalciferol (VITAMIN D3) 1,000 units tablet  Self No No   Sig: Take 5 tablets (5,000 Units total) by mouth daily   clonazePAM (KlonoPIN) 0.5 mg tablet  Self No No   Sig: Take 1 tablet (0.5 mg total) by mouth 3 (three) times a day Do not start before January 2, 2025.   denosumab (XGEVA) 120 mg/1.7 mL   Yes No   Sig: Inject 120 mg under the skin   dextromethorphan-guaifenesin (MUCINEX DM)  MG per 12 hr tablet   No No   Sig: Take 1 tablet by mouth every 12 (twelve) hours   ferrous sulfate 324 (65 Fe) mg  Self No No   Sig: Take 1 tablet (324 mg total) by mouth 2 (two) times a day before meals    Patient taking differently: Take 324 mg by mouth daily before breakfast   fluticasone (FLONASE) 50 mcg/act nasal spray   No No   Si sprays into each nostril daily as needed for rhinitis (congestion)   fluvoxaMINE (LUVOX) 100 mg tablet  Self No No   Sig: Fluvoxamine 100m tablet in the morning ; 2 tablets at night   lamoTRIgine (LaMICtal) 200 MG tablet  Self No No   Sig: Lamotrigine 200m tablet in the morning , 1 tablet at night   montelukast (SINGULAIR) 10 mg tablet  Self No No   Sig: Take 1 tablet (10 mg total) by mouth daily at bedtime   omeprazole (PriLOSEC) 40 MG capsule  Self No No   Sig: TAKE 1 CAPSULE BY MOUTH DAILY  BEFORE BREAKFAST   ondansetron (ZOFRAN) 4 mg tablet  Self No No   Sig: Take 1 tablet (4 mg total) by mouth every 8 (eight) hours as needed for nausea or vomiting   rosuvastatin (CRESTOR) 20 MG tablet  Self No No   Sig: TAKE 1 TABLET BY MOUTH IN THE  EVENING   traMADol (ULTRAM) 50 mg tablet   No No   Sig: Take 1 tablet (50 mg total) by mouth 2 (two) times a day as needed for moderate pain      Facility-Administered Medications: None     Patient's Medications   Discharge Prescriptions    No medications on file     No discharge procedures on file.  ED SEPSIS DOCUMENTATION   Time reflects when diagnosis was documented in both MDM as applicable and the Disposition within this note       Time User Action Codes Description Comment    2025  1:46 PM Radha Rivers [K52.9] Colitis     2025  1:46 PM Radha Rivers [N17.9] CAM (acute kidney injury) (HCC)     2025  1:46 PM Radha Rivers [R53.1] Generalized weakness                  EMERITA Lomeli  [phone]

## 2025-02-17 NOTE — ASSESSMENT & PLAN NOTE
Home regimen: Amlodipine 5 mg daily, Coreg 25 mg twice daily, amiloride 5 mg twice daily  Hold Amoride given CAM  Continue amlodipine/Coreg with increased hold parameters given CAM  SBP currently stable

## 2025-02-17 NOTE — ASSESSMENT & PLAN NOTE
Left message for patient at      Telephone Information:   Mobile [phone]    to schedule Consult.  Patient to return call to Elsie ALEJO [phone]     W/Q:[id number]     Normal anion gap secondary to diarrhea will place on oral sodium bicarbonate along with lactated Ringer's and follow

## 2025-02-17 NOTE — CONSULTS
Consultation - Nephrology   Name: Ayleen Moralez 64 y.o. female I MRN: [id number]  Unit/Bed#: ED 04 I Date of Admission: 2/17/2025   Date of Service: 2/17/2025 I Hospital Day: 0   Inpatient consult to Nephrology  Consult performed by: Robinson Samuel MD  Consult ordered by: Zaira Robledo PA-C        Physician Requesting Evaluation: Dona Miguel MD   Reason for Evaluation / Principal Problem: CAM on CKD    Assessment & Plan  Acute kidney injury superimposed on stage 4 chronic kidney disease (HCC)  Etiology: Prerenal in the setting of GI losses inability to take oral fluids possible mild effect of amiloride  Baseline creatinine approximately 2.6  Creatinine 3.44 on admission 2/17/2024    Of note patient with left wrist AV fistula    Recommend:  - Hold amiloride  - Lactated Ringer's  Hypokalemia  -Replete/magnesium acceptable 2.0  Essential (primary) hypertension  Slightly elevated continue home medications including the following  - Amlodipine 5 mg daily  - Carvedilol 25 twice daily  Hold amiloride for now even CAM  Metabolic acidosis  Normal anion gap secondary to diarrhea will place on oral sodium bicarbonate along with lactated Ringer's and follow  Diarrhea  Possible viral gastroenteritis including nausea vomiting and diarrhea per primary service      I have reviewed the nephrology recommendations including treatment for acute kidney injury on top of chronic kidney disease and electrolyte disorders, with SLIM, and we are in agreement with renal plan including the information outlined above.     History of Present Illness   Ayleen Moralez is a 64 y.o. female with a PMH of CKD stage IV/bipolar disorder/CVA/GERD/dyslipidemia/hypertension who was admitted to [facility] after presenting with 1 week history of nausea vomiting diarrhea decreased oral intake. A renal consultation is requested today for assistance in the management of acute on top of chronic kidney  disease    Patient with GI symptoms for 1 week left lower quadrant abdominal pain some cramping nausea vomiting diarrhea not able to keep any food down may have lost weight uncertain  No fevers but chills  No active urinary symptoms including dysuria hematuria  No NSAID use      Patient is followed by Dr. Mills for CKD 4 baseline creatinine approximately 2.63 from 1/21/2025  Creatinine from 2/10/2025 3.36  Creatinine on admission from 2/17/2025: 3.44    Review of Systems  General: No fevers, chills decreased oral intake please see HPI, perhaps may have lost weight but uncertain  GI: See HPI  :  Please see HPI  Cardiac: Musculoskeletal chest pain with movement only no shortness of breath no leg swelling  Pulmonary:  No coughing   Neuro:  No headaches    The rest review of systems is otherwise completely reviewed with the patient are negative    Historical Information   Past Medical History:   Diagnosis Date    Aftercare following left knee joint replacement surgery 02/15/2024    Anxiety     Anxiety disorder     Arthritis     At risk for falls     Bipolar 2 disorder (HCC)     Chronic back pain     Chronic kidney disease     Closed fracture of distal end of right fibula with routine healing 11/04/2020    COVID-19     in Jan 2021    CVA (cerebral vascular accident) (HCC)     noted on MRI in the past    Depression     GERD (gastroesophageal reflux disease)     Hypercholesteremia     Hypernatremia     Hypertension     Hypokalemia     Idiopathic chronic pancreatitis (HCC) 03/20/2018    Intervertebral disc disorder with radiculopathy of lumbosacral region     resolved: 12/28/2015    Limb alert care status     LUE-fistula    Panic attacks     Pericardial effusion     PONV (postoperative nausea and vomiting)     Psychiatric problem     Radiculitis     resolved: 12/28/2015    Secondary renal hyperparathyroidism (HCC)     Stroke (HCC)     Vitamin D deficiency      Past Surgical History:   Procedure Laterality Date     BUNIONECTOMY      Left foot     CAST APPLICATION Right 2022    Procedure: Application short-arm thumb spica splint;  Surgeon: Lyndon Victoria MD;  Location: [facility];  Service: Orthopedics    COLON SURGERY      COLONOSCOPY  2018    COLONOSCOPY  2021    DILATION AND CURETTAGE OF UTERUS      INDUCED       surgically induced    TN ARTERIOVENOUS ANASTOMOSIS OPEN DIRECT Left 2019    Procedure: CREATION FISTULA ARTERIOVENOUS (AV) left wrists possible left upper;  Surgeon: Andrey Quintero MD;  Location: [facility];  Service: Vascular    TN ARTHRP KNE CONDYLE&PLATU MEDIAL&LAT COMPARTMENTS Left 2024    Procedure: ARTHROPLASTY KNEE TOTAL SAME DAY;  Surgeon: Riki Ma MD;  Location: [facility];  Service: Orthopedics    TN CORRJ HLX VLGS BNCTY SESMDC DSTL METAR OSTEOT Left 2019    Procedure: Serge bunionectomy;  Surgeon: Munir Larkin DPM;  Location: [facility];  Service: Podiatry    TN CORRJ HLX VLGS BNCTY SESMDC DSTL METAR OSTEOT Right 2020    Procedure: BUNIONECTOMY RAFAEL;  Surgeon: Munir Larkin DPM;  Location: [facility];  Service: Podiatry    TN CORRJ HLX VLGS BNCTY SESMDC PROX PHLX OSTEOT Right 2021    Procedure: BUNIONECTOMY TAMEKA, right tameka osteotomy and 2nd claw toe correction;  Surgeon: James R Lachman, MD;  Location: [facility];  Service: Orthopedics    TN ERCP DX COLLECTION SPECIMEN BRUSHING/WASHING N/A 2018    Procedure: ENDOSCOPIC RETROGRADE CHOLANGIOPANCREATOGRAPHY (ERCP);  Surgeon: Alfredo Messina MD;  Location: [facility];  Service: Gastroenterology    TN LAPAROSCOPY PROCTOPEXY PROLAPSE N/A 2018    Procedure: ROBOTIC SIGMOID RESECTION / RECTOPEXY;  Surgeon: ELPIDIO Mcnulty MD;  Location: [facility];  Service: Colorectal    TN OPEN TREATMENT RADIAL SHAFT FRACTURE W/INT FIXJ Right 10/11/2021    Procedure: OPEN REDUCTION W/ INTERNAL FIXATION (ORIF) RADIUS (WRIST), RIGHT DISTAL;  Surgeon: Riki Ma MD;  Location: [facility];   Service: Orthopedics    OK REMOVAL IMPLANT DEEP Right 06/23/2022    Procedure: Removal of hardware volar aspect right distal radius (distal radial plate and screws);  Surgeon: Lyndon Victoria MD;  Location: [facility];  Service: Orthopedics    OK SIGMOIDOSCOPY FLX DX W/COLLJ SPEC BR/WA IF PFRMD N/A 07/13/2018    Procedure: SIGMOIDOSCOPY FLEXIBLE;  Surgeon: ELPIDIO Mcnulty MD;  [facility];  Service: Colorectal    OK TR TDN RESTORE INTRNSC FUNCJ ALL 4 FNGRS Right 06/23/2022    Procedure: Right ring finger flexor digitorum superficialis to flexor pollicis longus tendon transfer;  Surgeon: Lyndon Victoria MD;  Location: [facility];  Service: Orthopedics    TUBAL LIGATION Bilateral 1997    US GUIDED THYROID BIOPSY  07/30/2019     Social History     Tobacco Use    Smoking status: Never    Smokeless tobacco: Never   Vaping Use    Vaping status: Never Used   Substance and Sexual Activity    Alcohol use: Not Currently    Drug use: Not Currently     Types: Hydrocodone, Marijuana     Comment: MEDICATION   Ayleen has h/o marijuana abuse- not currently    Sexual activity: Not Currently     Partners: Male     Birth control/protection: Post-menopausal     E-Cigarette/Vaping    E-Cigarette Use Never User      E-Cigarette/Vaping Substances    Nicotine No     THC No     CBD No     Flavoring No     Other No     Unknown No      Family history non-contributory  Social History     Tobacco Use    Smoking status: Never    Smokeless tobacco: Never   Vaping Use    Vaping status: Never Used   Substance and Sexual Activity    Alcohol use: Not Currently    Drug use: Not Currently     Types: Hydrocodone, Marijuana     Comment: MEDICATION   Ayleen has h/o marijuana abuse- not currently    Sexual activity: Not Currently     Partners: Male     Birth control/protection: Post-menopausal       Current Facility-Administered Medications:     lactated ringers infusion, Continuous, Last Rate: 75 mL/hr (02/17/25 7515)    sodium  bicarbonate tablet 650 mg, BID after meals  Prior to Admission Medications   Prescriptions Last Dose Informant Patient Reported? Taking?   AMILoride 5 mg tablet   No No   Sig: TAKE 1 TABLET BY MOUTH TWICE  DAILY   Cyanocobalamin (VITAMIN B 12 PO)  Self Yes No   Sig: Take 1,000 mcg by mouth in the morning   Multiple Vitamin (MULTI-VITAMIN) tablet  Self No No   Sig: Take 1 tablet by mouth daily   QUEtiapine (SEROquel) 100 mg tablet  Self No No   Sig: Take 1 tablet (100 mg total) by mouth in the morning   QUEtiapine (SEROquel) 300 mg tablet  Self No No   Sig: Take 1 tablet (300 mg total) by mouth in the morning   QUEtiapine (SEROquel) 400 MG tablet  Self No No   Sig: Take 1 tablet (400 mg total) by mouth daily at bedtime Take with the Seroquel 100 mg to total 500 mg daily at bedtime   acetaminophen (TYLENOL) 500 mg tablet  Self No No   Sig: Take 2 tablets (1,000 mg total) by mouth every 8 (eight) hours   albuterol (Ventolin HFA) 90 mcg/act inhaler  Self No No   Sig: Inhale 2 puffs every 6 (six) hours as needed for wheezing or shortness of breath   amLODIPine (NORVASC) 5 mg tablet  Self No No   Sig: Take 1 tablet (5 mg total) by mouth daily   ascorbic acid (VITAMIN C) 500 MG tablet  Self No No   Sig: Take 1 tablet (500 mg total) by mouth 2 (two) times a day   aspirin 81 mg chewable tablet  Self No No   Sig: Chew 1 tablet (81 mg total) 2 (two) times a day   budesonide-formoterol (Symbicort) 160-4.5 mcg/act inhaler  Self No No   Sig: Inhale 2 puffs 2 (two) times a day Rinse mouth after use.   calcium carbonate (Calcium 600) 600 MG tablet  Self Yes No   Sig: Take 600 mg by mouth 2 (two) times a day with meals   carvedilol (COREG) 25 mg tablet  Self No No   Sig: TAKE 1 TABLET BY MOUTH TWICE  DAILY WITH MEALS   cholecalciferol (VITAMIN D3) 1,000 units tablet  Self No No   Sig: Take 5 tablets (5,000 Units total) by mouth daily   clonazePAM (KlonoPIN) 0.5 mg tablet  Self No No   Sig: Take 1 tablet (0.5 mg total) by mouth 3  (three) times a day Do not start before 2025.   denosumab (XGEVA) 120 mg/1.7 mL   Yes No   Sig: Inject 120 mg under the skin   dextromethorphan-guaifenesin (MUCINEX DM)  MG per 12 hr tablet   No No   Sig: Take 1 tablet by mouth every 12 (twelve) hours   ferrous sulfate 324 (65 Fe) mg  Self No No   Sig: Take 1 tablet (324 mg total) by mouth 2 (two) times a day before meals   Patient taking differently: Take 324 mg by mouth daily before breakfast   fluticasone (FLONASE) 50 mcg/act nasal spray   No No   Si sprays into each nostril daily as needed for rhinitis (congestion)   fluvoxaMINE (LUVOX) 100 mg tablet  Self No No   Sig: Fluvoxamine 100m tablet in the morning ; 2 tablets at night   lamoTRIgine (LaMICtal) 200 MG tablet  Self No No   Sig: Lamotrigine 200m tablet in the morning , 1 tablet at night   montelukast (SINGULAIR) 10 mg tablet  Self No No   Sig: Take 1 tablet (10 mg total) by mouth daily at bedtime   omeprazole (PriLOSEC) 40 MG capsule  Self No No   Sig: TAKE 1 CAPSULE BY MOUTH DAILY  BEFORE BREAKFAST   ondansetron (ZOFRAN) 4 mg tablet  Self No No   Sig: Take 1 tablet (4 mg total) by mouth every 8 (eight) hours as needed for nausea or vomiting   rosuvastatin (CRESTOR) 20 MG tablet  Self No No   Sig: TAKE 1 TABLET BY MOUTH IN THE  EVENING   traMADol (ULTRAM) 50 mg tablet   No No   Sig: Take 1 tablet (50 mg total) by mouth 2 (two) times a day as needed for moderate pain      Facility-Administered Medications: None     Molds & smuts, Bee pollen, and Pollen extract    Objective :  Temp:  [98.3 °F (36.8 °C)] 98.3 °F (36.8 °C)  HR:  [68-71] 68  BP: (123-162)/(69-83) 162/83  Resp:  [18-19] 18  SpO2:  [95 %-97 %] 97 %  O2 Device: None (Room air)    Current Weight:    First Weight:    I/O         02/15 07 07 07 07 0701   0700    IV Piggyback   1000    Total Intake   1000    Net   +1000                 Physical Exam  General: Well-developed  well-nourished, no acute distress  Skin:  No acute rash  Eyes: No scleral icterus and noninjected  ENT: Normocephalic/atraumatic dry mucous membranes  Neck:  Supple, no jugular venous distention, trachea midline, overall appearance is normal; no  carotid bruits, 1+ carotid upstroke  Back: No CVA tenderness spine midline  Chest:  Clear to auscultation and percussion, good respiratory effort no use of accessory respiratory muscles  CVS:  Regular rate and rhythm, without a rub or gallops or murmurs, normal S3 and S4  Abdomen:  Normal bowel sounds, soft and mild left lower quadrant tenderness to deep palpation no rebound tenderness and nondistended; no overt hepatosplenomegaly or bruits appreciable  Extremities:  No clubbing, cyanosis or edema; 2+ dorsalis pedis pulse, no femoral bruits and no significant arthritic changes; left wrist AV fistula with a good bruit and thrill  Neuro:  No gross focality  Psych:  Alert and oriented and appropriate     Medications:    Current Facility-Administered Medications:     lactated ringers infusion, 75 mL/hr, Intravenous, Continuous, Zaira Robledo PA-C, Last Rate: 75 mL/hr at 02/17/25 1426, 75 mL/hr at 02/17/25 1426    sodium bicarbonate tablet 650 mg, 650 mg, Oral, BID after meals, Zaira Robledo PA-C    Current Outpatient Medications:     acetaminophen (TYLENOL) 500 mg tablet, Take 2 tablets (1,000 mg total) by mouth every 8 (eight) hours, Disp: 60 tablet, Rfl: 0    albuterol (Ventolin HFA) 90 mcg/act inhaler, Inhale 2 puffs every 6 (six) hours as needed for wheezing or shortness of breath, Disp: 8.5 g, Rfl: 3    AMILoride 5 mg tablet, TAKE 1 TABLET BY MOUTH TWICE  DAILY, Disp: 180 tablet, Rfl: 1    amLODIPine (NORVASC) 5 mg tablet, Take 1 tablet (5 mg total) by mouth daily, Disp: 90 tablet, Rfl: 1    ascorbic acid (VITAMIN C) 500 MG tablet, Take 1 tablet (500 mg total) by mouth 2 (two) times a day, Disp: 60 tablet, Rfl: 2    aspirin 81 mg chewable tablet,  Chew 1 tablet (81 mg total) 2 (two) times a day, Disp: 60 tablet, Rfl: 0    budesonide-formoterol (Symbicort) 160-4.5 mcg/act inhaler, Inhale 2 puffs 2 (two) times a day Rinse mouth after use., Disp: 10.2 g, Rfl: 11    calcium carbonate (Calcium 600) 600 MG tablet, Take 600 mg by mouth 2 (two) times a day with meals, Disp: , Rfl:     carvedilol (COREG) 25 mg tablet, TAKE 1 TABLET BY MOUTH TWICE  DAILY WITH MEALS, Disp: 180 tablet, Rfl: 1    cholecalciferol (VITAMIN D3) 1,000 units tablet, Take 5 tablets (5,000 Units total) by mouth daily, Disp: 90 tablet, Rfl: 5    clonazePAM (KlonoPIN) 0.5 mg tablet, Take 1 tablet (0.5 mg total) by mouth 3 (three) times a day Do not start before 2025., Disp: 270 tablet, Rfl: 0    Cyanocobalamin (VITAMIN B 12 PO), Take 1,000 mcg by mouth in the morning, Disp: , Rfl:     denosumab (XGEVA) 120 mg/1.7 mL, Inject 120 mg under the skin, Disp: , Rfl:     dextromethorphan-guaifenesin (MUCINEX DM)  MG per 12 hr tablet, Take 1 tablet by mouth every 12 (twelve) hours, Disp: 60 tablet, Rfl: 0    ferrous sulfate 324 (65 Fe) mg, Take 1 tablet (324 mg total) by mouth 2 (two) times a day before meals (Patient taking differently: Take 324 mg by mouth daily before breakfast), Disp: 60 tablet, Rfl: 2    fluticasone (FLONASE) 50 mcg/act nasal spray, 2 sprays into each nostril daily as needed for rhinitis (congestion), Disp: 15.8 mL, Rfl: 0    fluvoxaMINE (LUVOX) 100 mg tablet, Fluvoxamine 100m tablet in the morning ; 2 tablets at night, Disp: 270 tablet, Rfl: 2    lamoTRIgine (LaMICtal) 200 MG tablet, Lamotrigine 200m tablet in the morning , 1 tablet at night, Disp: 180 tablet, Rfl: 3    montelukast (SINGULAIR) 10 mg tablet, Take 1 tablet (10 mg total) by mouth daily at bedtime, Disp: 30 tablet, Rfl: 5    Multiple Vitamin (MULTI-VITAMIN) tablet, Take 1 tablet by mouth daily, Disp: 30 tablet, Rfl: 2    omeprazole (PriLOSEC) 40 MG capsule, TAKE 1 CAPSULE BY MOUTH DAILY  BEFORE  "BREAKFAST, Disp: 90 capsule, Rfl: 3    ondansetron (ZOFRAN) 4 mg tablet, Take 1 tablet (4 mg total) by mouth every 8 (eight) hours as needed for nausea or vomiting, Disp: 30 tablet, Rfl: 1    QUEtiapine (SEROquel) 100 mg tablet, Take 1 tablet (100 mg total) by mouth in the morning, Disp: 90 tablet, Rfl: 3    QUEtiapine (SEROquel) 300 mg tablet, Take 1 tablet (300 mg total) by mouth in the morning, Disp: 90 tablet, Rfl: 3    QUEtiapine (SEROquel) 400 MG tablet, Take 1 tablet (400 mg total) by mouth daily at bedtime Take with the Seroquel 100 mg to total 500 mg daily at bedtime, Disp: 90 tablet, Rfl: 3    rosuvastatin (CRESTOR) 20 MG tablet, TAKE 1 TABLET BY MOUTH IN THE  EVENING, Disp: 90 tablet, Rfl: 3    traMADol (ULTRAM) 50 mg tablet, Take 1 tablet (50 mg total) by mouth 2 (two) times a day as needed for moderate pain, Disp: 60 tablet, Rfl: 0      Lab Results: I have reviewed the following results:  Results from last 7 days   Lab Units 02/17/25  1044   WBC Thousand/uL 6.40   HEMOGLOBIN g/dL 12.3   HEMATOCRIT % 38.8   PLATELETS Thousands/uL 204   POTASSIUM mmol/L 3.5   CHLORIDE mmol/L 111*   CO2 mmol/L 20*   BUN mg/dL 29*   CREATININE mg/dL 3.44*   CALCIUM mg/dL 9.5   MAGNESIUM mg/dL 2.0   ALBUMIN g/dL 3.9       Administrative Statements     Portions of the record may have been created with voice recognition software. Occasional wrong word or \"sound a like\" substitutions may have occurred due to the inherent limitations of voice recognition software. Read the chart carefully and recognize, using context, where substitutions have occurred.If you have any questions, please contact the dictating provider.  "

## 2025-02-17 NOTE — ASSESSMENT & PLAN NOTE
Continue home Seroquel, Luvox, Lamictal, Klonopin  PDMP reviewed, 270 tabs 0.5 mg klonopin filled 1/7 x 90 days   Lamictal level pending

## 2025-02-17 NOTE — ASSESSMENT & PLAN NOTE
Slightly elevated continue home medications including the following  - Amlodipine 5 mg daily  - Carvedilol 25 twice daily  Hold amiloride for now even CAM

## 2025-02-17 NOTE — PLAN OF CARE

## 2025-02-17 NOTE — ASSESSMENT & PLAN NOTE
Etiology: Prerenal in the setting of GI losses inability to take oral fluids possible mild effect of amiloride  Baseline creatinine approximately 2.6  Creatinine 3.44 on admission 2/17/2024    Of note patient with left wrist AV fistula    Recommend:  - Hold amiloride  - Lactated Ringer's

## 2025-02-17 NOTE — ED NOTES
"In middle of triage pt hopped out of bed and states \"I need to go to the bathroom\" and walked out of room     Selene Ndiaye RN  02/17/25 1019    "

## 2025-02-18 LAB
ALBUMIN SERPL BCG-MCNC: 3.5 G/DL (ref 3.5–5)
ALP SERPL-CCNC: 94 U/L (ref 34–104)
ALT SERPL W P-5'-P-CCNC: 15 U/L (ref 7–52)
ANION GAP SERPL CALCULATED.3IONS-SCNC: 9 MMOL/L (ref 4–13)
AST SERPL W P-5'-P-CCNC: 19 U/L (ref 13–39)
BASOPHILS # BLD AUTO: 0.07 THOUSANDS/ΜL (ref 0–0.1)
BASOPHILS NFR BLD AUTO: 1 % (ref 0–1)
BILIRUB SERPL-MCNC: 0.35 MG/DL (ref 0.2–1)
BUN SERPL-MCNC: 24 MG/DL (ref 5–25)
C COLI+JEJUNI TUF STL QL NAA+PROBE: NEGATIVE
C DIFF TOX GENS STL QL NAA+PROBE: NEGATIVE
CALCIUM SERPL-MCNC: 9.2 MG/DL (ref 8.4–10.2)
CHLORIDE SERPL-SCNC: 114 MMOL/L (ref 96–108)
CO2 SERPL-SCNC: 19 MMOL/L (ref 21–32)
CREAT SERPL-MCNC: 3.18 MG/DL (ref 0.6–1.3)
EC STX1+STX2 GENES STL QL NAA+PROBE: NEGATIVE
EOSINOPHIL # BLD AUTO: 0.27 THOUSAND/ΜL (ref 0–0.61)
EOSINOPHIL NFR BLD AUTO: 4 % (ref 0–6)
ERYTHROCYTE [DISTWIDTH] IN BLOOD BY AUTOMATED COUNT: 12.4 % (ref 11.6–15.1)
GFR SERPL CREATININE-BSD FRML MDRD: 14 ML/MIN/1.73SQ M
GLUCOSE SERPL-MCNC: 94 MG/DL (ref 65–140)
HCT VFR BLD AUTO: 34.7 % (ref 34.8–46.1)
HGB BLD-MCNC: 11.3 G/DL (ref 11.5–15.4)
IMM GRANULOCYTES # BLD AUTO: 0.01 THOUSAND/UL (ref 0–0.2)
IMM GRANULOCYTES NFR BLD AUTO: 0 % (ref 0–2)
LYMPHOCYTES # BLD AUTO: 1.76 THOUSANDS/ΜL (ref 0.6–4.47)
LYMPHOCYTES NFR BLD AUTO: 28 % (ref 14–44)
MAGNESIUM SERPL-MCNC: 1.8 MG/DL (ref 1.9–2.7)
MCH RBC QN AUTO: 32.9 PG (ref 26.8–34.3)
MCHC RBC AUTO-ENTMCNC: 32.6 G/DL (ref 31.4–37.4)
MCV RBC AUTO: 101 FL (ref 82–98)
MONOCYTES # BLD AUTO: 0.63 THOUSAND/ΜL (ref 0.17–1.22)
MONOCYTES NFR BLD AUTO: 10 % (ref 4–12)
NEUTROPHILS # BLD AUTO: 3.55 THOUSANDS/ΜL (ref 1.85–7.62)
NEUTS SEG NFR BLD AUTO: 57 % (ref 43–75)
NRBC BLD AUTO-RTO: 0 /100 WBCS
PHOSPHATE SERPL-MCNC: 3.2 MG/DL (ref 2.3–4.1)
PLATELET # BLD AUTO: 172 THOUSANDS/UL (ref 149–390)
PMV BLD AUTO: 11.3 FL (ref 8.9–12.7)
POTASSIUM SERPL-SCNC: 3.2 MMOL/L (ref 3.5–5.3)
PROT SERPL-MCNC: 5.8 G/DL (ref 6.4–8.4)
RBC # BLD AUTO: 3.43 MILLION/UL (ref 3.81–5.12)
SALMONELLA SP SPAO STL QL NAA+PROBE: NEGATIVE
SHIGELLA SP+EIEC IPAH STL QL NAA+PROBE: NEGATIVE
SODIUM SERPL-SCNC: 142 MMOL/L (ref 135–147)
WBC # BLD AUTO: 6.29 THOUSAND/UL (ref 4.31–10.16)

## 2025-02-18 PROCEDURE — 83735 ASSAY OF MAGNESIUM: CPT

## 2025-02-18 PROCEDURE — 80053 COMPREHEN METABOLIC PANEL: CPT

## 2025-02-18 PROCEDURE — 84100 ASSAY OF PHOSPHORUS: CPT

## 2025-02-18 PROCEDURE — 99232 SBSQ HOSP IP/OBS MODERATE 35: CPT | Performed by: PHYSICIAN ASSISTANT

## 2025-02-18 PROCEDURE — 85025 COMPLETE CBC W/AUTO DIFF WBC: CPT

## 2025-02-18 PROCEDURE — 99232 SBSQ HOSP IP/OBS MODERATE 35: CPT | Performed by: INTERNAL MEDICINE

## 2025-02-18 RX ORDER — SODIUM CHLORIDE, SODIUM LACTATE, POTASSIUM CHLORIDE, CALCIUM CHLORIDE 600; 310; 30; 20 MG/100ML; MG/100ML; MG/100ML; MG/100ML
100 INJECTION, SOLUTION INTRAVENOUS CONTINUOUS
Status: DISCONTINUED | OUTPATIENT
Start: 2025-02-18 | End: 2025-02-18

## 2025-02-18 RX ORDER — POTASSIUM CHLORIDE 1500 MG/1
40 TABLET, EXTENDED RELEASE ORAL ONCE
Status: COMPLETED | OUTPATIENT
Start: 2025-02-18 | End: 2025-02-18

## 2025-02-18 RX ORDER — CALCIUM CARBONATE 500 MG/1
500 TABLET, CHEWABLE ORAL DAILY PRN
Status: DISCONTINUED | OUTPATIENT
Start: 2025-02-18 | End: 2025-02-21 | Stop reason: HOSPADM

## 2025-02-18 RX ORDER — AMLODIPINE BESYLATE 5 MG/1
5 TABLET ORAL DAILY
Qty: 90 TABLET | Refills: 3 | Status: SHIPPED | OUTPATIENT
Start: 2025-02-18

## 2025-02-18 RX ORDER — SODIUM BICARBONATE 650 MG/1
1300 TABLET ORAL
Status: DISCONTINUED | OUTPATIENT
Start: 2025-02-18 | End: 2025-02-19

## 2025-02-18 RX ORDER — MAGNESIUM SULFATE HEPTAHYDRATE 40 MG/ML
2 INJECTION, SOLUTION INTRAVENOUS ONCE
Status: COMPLETED | OUTPATIENT
Start: 2025-02-18 | End: 2025-02-18

## 2025-02-18 RX ORDER — LOPERAMIDE HYDROCHLORIDE 2 MG/1
2 CAPSULE ORAL 4 TIMES DAILY PRN
Status: DISCONTINUED | OUTPATIENT
Start: 2025-02-18 | End: 2025-02-21 | Stop reason: HOSPADM

## 2025-02-18 RX ADMIN — LOPERAMIDE HYDROCHLORIDE 2 MG: 2 CAPSULE ORAL at 23:48

## 2025-02-18 RX ADMIN — FERROUS SULFATE TAB 325 MG (65 MG ELEMENTAL FE) 325 MG: 325 (65 FE) TAB at 08:47

## 2025-02-18 RX ADMIN — POTASSIUM CHLORIDE 40 MEQ: 1500 TABLET, EXTENDED RELEASE ORAL at 13:44

## 2025-02-18 RX ADMIN — DICYCLOMINE HYDROCHLORIDE 10 MG: 10 CAPSULE ORAL at 17:31

## 2025-02-18 RX ADMIN — FLUVOXAMINE MALEATE 100 MG: 50 TABLET ORAL at 08:46

## 2025-02-18 RX ADMIN — ACETAMINOPHEN 650 MG: 325 TABLET, FILM COATED ORAL at 23:37

## 2025-02-18 RX ADMIN — FLUVOXAMINE MALEATE 200 MG: 50 TABLET ORAL at 21:39

## 2025-02-18 RX ADMIN — CALCIUM CARBONATE (ANTACID) CHEW TAB 500 MG 500 MG: 500 CHEW TAB at 23:48

## 2025-02-18 RX ADMIN — LAMOTRIGINE 200 MG: 100 TABLET ORAL at 17:31

## 2025-02-18 RX ADMIN — DICYCLOMINE HYDROCHLORIDE 10 MG: 10 CAPSULE ORAL at 06:08

## 2025-02-18 RX ADMIN — SODIUM BICARBONATE 100 ML/HR: 84 INJECTION, SOLUTION INTRAVENOUS at 13:44

## 2025-02-18 RX ADMIN — MONTELUKAST 10 MG: 10 TABLET, FILM COATED ORAL at 21:39

## 2025-02-18 RX ADMIN — SODIUM BICARBONATE 650 MG TABLET 1300 MG: at 17:31

## 2025-02-18 RX ADMIN — BUDESONIDE AND FORMOTEROL FUMARATE DIHYDRATE 2 PUFF: 160; 4.5 AEROSOL RESPIRATORY (INHALATION) at 08:47

## 2025-02-18 RX ADMIN — BUDESONIDE AND FORMOTEROL FUMARATE DIHYDRATE 2 PUFF: 160; 4.5 AEROSOL RESPIRATORY (INHALATION) at 17:32

## 2025-02-18 RX ADMIN — HEPARIN SODIUM 5000 UNITS: 5000 INJECTION INTRAVENOUS; SUBCUTANEOUS at 13:44

## 2025-02-18 RX ADMIN — SODIUM CHLORIDE, SODIUM LACTATE, POTASSIUM CHLORIDE, AND CALCIUM CHLORIDE 75 ML/HR: .6; .31; .03; .02 INJECTION, SOLUTION INTRAVENOUS at 08:47

## 2025-02-18 RX ADMIN — QUETIAPINE FUMARATE 100 MG: 100 TABLET ORAL at 08:46

## 2025-02-18 RX ADMIN — CARVEDILOL 25 MG: 25 TABLET, FILM COATED ORAL at 17:31

## 2025-02-18 RX ADMIN — SODIUM BICARBONATE 100 ML/HR: 84 INJECTION, SOLUTION INTRAVENOUS at 23:34

## 2025-02-18 RX ADMIN — ATORVASTATIN CALCIUM 40 MG: 40 TABLET, FILM COATED ORAL at 17:31

## 2025-02-18 RX ADMIN — Medication 5000 UNITS: at 08:47

## 2025-02-18 RX ADMIN — ASPIRIN 81 MG CHEWABLE TABLET 81 MG: 81 TABLET CHEWABLE at 08:45

## 2025-02-18 RX ADMIN — DICYCLOMINE HYDROCHLORIDE 10 MG: 10 CAPSULE ORAL at 21:39

## 2025-02-18 RX ADMIN — DICYCLOMINE HYDROCHLORIDE 10 MG: 10 CAPSULE ORAL at 11:28

## 2025-02-18 RX ADMIN — HEPARIN SODIUM 5000 UNITS: 5000 INJECTION INTRAVENOUS; SUBCUTANEOUS at 21:39

## 2025-02-18 RX ADMIN — BISMUTH SUBSALICYLATE 525 MG 524 MG: 525 LIQUID ORAL at 23:48

## 2025-02-18 RX ADMIN — LAMOTRIGINE 200 MG: 100 TABLET ORAL at 08:47

## 2025-02-18 RX ADMIN — SODIUM BICARBONATE 650 MG TABLET 1300 MG: at 08:46

## 2025-02-18 RX ADMIN — QUETIAPINE 500 MG: 200 TABLET ORAL at 21:39

## 2025-02-18 RX ADMIN — MAGNESIUM SULFATE HEPTAHYDRATE 2 G: 40 INJECTION, SOLUTION INTRAVENOUS at 10:21

## 2025-02-18 RX ADMIN — POTASSIUM CHLORIDE 40 MEQ: 1500 TABLET, EXTENDED RELEASE ORAL at 08:45

## 2025-02-18 RX ADMIN — PANTOPRAZOLE SODIUM 40 MG: 40 TABLET, DELAYED RELEASE ORAL at 06:08

## 2025-02-18 NOTE — PLAN OF CARE

## 2025-02-18 NOTE — ASSESSMENT & PLAN NOTE
Normal anion gap secondary to diarrhea.  -  continue on oral sodium bicarbonate along with lactated Ringer's and follow.

## 2025-02-18 NOTE — CASE MANAGEMENT
Case Management Assessment & Discharge Planning Note    Patient name Ayleen Moralez  Location /-01 MRN [id number]  : 1960 Date 2025       Current Admission Date: 2025  Current Admission Diagnosis:Acute kidney injury superimposed on stage 4 chronic kidney disease (HCC)   Patient Active Problem List    Diagnosis Date Noted Date Diagnosed    Diarrhea 2025     Metabolic acidosis 2025     Opioid dependence, uncomplicated ([facility]) 2025     Right foot drop 2024     Contusion of left knee 10/30/2024     History of arthroplasty of left knee 10/30/2024     SPENCER (obstructive sleep apnea) 2024     Contusion of buttock 2024     Bipolar disorder, current episode manic severe with psychotic features ([facility]) 2024     Hyperosmolality and hypernatremia 2024     Hypokalemia 2024     Intervertebral disc disorders with radiculopathy, lumbar region 2024     Low back pain, unspecified 2024     Major depressive disorder, single episode, unspecified 2024     Presence of left artificial knee joint 2024     Primary localized osteoarthritis of knees, bilateral 2023     Gastro-esophageal reflux disease without esophagitis 2023     Other chronic pain 2023     Anxiety disorder, unspecified 2023     Other bursitis of knee, unspecified knee 2023     Difficulty in walking, not elsewhere classified 2023     Generalized muscle weakness 2023     History of falling 2023     Moderate aortic stenosis 2023     Pulmonary embolism with infarction ([facility]) 2023     Interstitial pulmonary disease, unspecified ([facility]) 2023     Statin intolerance 2023     Concussion with no loss of consciousness 2023     Obesity (BMI 30-39.9) 2023     Umbilical hernia 2023     Pes anserinus bursitis of both knees 2023     Mixed obsessional thoughts and acts 2022     Eating  disorder, unspecified 09/28/2022     Pain of right upper extremity 08/17/2022     Injury of right thumb 03/24/2022     Acute shoulder pain 12/14/2021     Aftercare following surgery of the musculoskeletal system 10/27/2021     Closed Colles' fracture of right radius 10/05/2021     Claw toe, acquired, right 09/27/2021     Injury of plantar plate, right, initial encounter 09/27/2021     Acquired hallux interphalangeus, right 09/27/2021     Family history of cardiac disorder in father 07/21/2021     Left knee tendonitis 03/11/2021     Effusion of right knee 02/12/2021     Hallux valgus, right 06/11/2020     Acquired hallux interphalangeus of right foot 06/11/2020     Bilateral sacroiliitis ([facility]) 02/19/2020     Right patellofemoral syndrome 01/30/2020     Fistula, left hand 01/08/2020     Steal syndrome dialysis vascular access ([facility]) 12/02/2019     Unilateral primary osteoarthritis, left knee 10/15/2019     Stage 4 chronic kidney disease ([facility]) 09/09/2019     Moderate persistent asthma, uncomplicated 08/27/2019     Thyroid nodule 08/14/2019     Abnormal thyroid exam 06/17/2019     Hyperphosphatemia 06/13/2019     Abnormal chest CT 06/05/2019     Contusion of right knee 05/02/2019     Cerebral infarction, unspecified ([facility]) 05/01/2019     Benign neoplasm of colon 04/02/2019     Drug-induced constipation 04/02/2019     Hyperparathyroidism, unspecified ([facility]) 03/19/2019     Other pericardial effusion (noninflammatory) 03/19/2019     Obsessive-compulsive disorder, unspecified 01/23/2019     Panic disorder (episodic paroxysmal anxiety) 01/23/2019     Eating disorder 01/23/2019     Essential (primary) hypertension 01/02/2019     Hyperprolactinemia ([facility]) 01/02/2019     Elevated LFTs 11/13/2018     Rectal prolapse 07/13/2018     Other chronic pancreatitis ([facility]) 03/20/2018     Primary osteoarthritis of right knee 03/20/2018     Age-related osteoporosis without current pathological fracture 03/20/2018     Renal cyst 02/20/2018      Vitamin D deficiency, unspecified 12/20/2017     Secondary hyperparathyroidism of renal origin (HCC) 12/20/2017     Spondylolisthesis at L5-S1 level 01/20/2017     Acute kidney injury superimposed on stage 4 chronic kidney disease (HCC) 01/17/2017     Pure hypercholesterolemia, unspecified 01/17/2017     Herniated nucleus pulposus, L5-S1 11/23/2015       LOS (days): 1  Geometric Mean LOS (GMLOS) (days): 3.4  Days to GMLOS:2.4     OBJECTIVE:    Risk of Unplanned Readmission Score: 30.06         Current admission status: Inpatient       Preferred Pharmacy:   [id number] - [place] - [address]  [address]  [place] [id number]  Phone: [phone] Fax: [phone]    [facility] - [place], [place] - [address]  [address]  [address]  [facility] [address]  Phone: [phone] Fax: [phone]    Primary Care Provider: Bette Harp, DO    Primary Insurance: MEDICARE  Secondary Insurance:     ASSESSMENT:  Active Health Care Proxies       Radha Driver Health Care Representative - Sister   Primary Phone: [phone] (Mobile)                 Advance Directives  Does patient have a Health Care POA?: No  Was patient offered paperwork?: Yes (declined)  Does patient currently have a Health Care decision maker?: Yes, please see Health Care Proxy section  Does patient have Advance Directives?: No  Was patient offered paperwork?: Yes (declined)  Primary Contact: Radha Driver,          Readmission Root Cause  30 Day Readmission: No    Patient Information  Admitted from:: Home  Mental Status: Alert  During Assessment patient was accompanied by: Not accompanied during assessment  Assessment information provided by:: Patient  Primary Caregiver: Self  Support Systems: Self, Family members  County of Residence: [place]  What city do you live in?: [place]  Type of Current Residence: [address] [place] home  Upon entering residence, is there a bedroom on the main floor (no  further steps)?: No  A bedroom is located on the following floor levels of residence (select all that apply):: 2nd Floor, 3rd Floor  Upon entering residence, is there a bathroom on the main floor (no further steps)?: No  Indicate which floors of current residence have a bathroom (select all the apply):: 2nd Floor  Number of steps to 2nd floor from main floor: One Flight  Number of steps to 3rd floor from main floor: Two Flights  Living Arrangements: Lives w/ Family members    Activities of Daily Living Prior to Admission  Functional Status: Independent  Completes ADLs independently?: Yes  Ambulates independently?: Yes  Does patient use assisted devices?: Yes  Assisted Devices (DME) used: Shower Chair, Walker  Does patient currently own DME?: Yes  What DME does the patient currently own?: Shower Chair, Walker  Does patient have a history of Outpatient Therapy (PT/OT)?: No  Does the patient have a history of Short-Term Rehab?: Yes ([facility])  Does patient have a history of HHC?: No  Does patient currently have HHC?: No         Patient Information Continued  Income Source: SSI/SSD  Does patient have prescription coverage?: Yes  Does patient receive dialysis treatments?: No  Does patient have a history of substance abuse?: No  Does patient have a history of Mental Health Diagnosis?: Yes (Bipolar, GARRET, depression)  Is patient receiving treatment for mental health?: Yes  Has patient received inpatient treatment related to mental health in the last 2 years?: No         Means of Transportation  [facility]          DISCHARGE DETAILS:    Discharge planning discussed with:: Patient  Freedom of Choice: Yes     CM contacted family/caregiver?: No- see comments (Pt is alert and oriented)  Were Treatment Team discharge recommendations reviewed with patient/caregiver?: Yes  Did patient/caregiver verbalize understanding of patient care needs?: Yes  Were patient/caregiver advised of the risks  associated with not following Treatment Team discharge recommendations?: Yes         Requested Home Health Care         Is the patient interested in HHC at discharge?: No    DME Referral Provided  Referral made for DME?: No              Treatment Team Recommendation: Home  Discharge Destination Plan:: Home  Transport at Discharge : Family                                      Additional Comments: CM met with pt to discuss role of CM and any needs prior to discharge. Pt resides w/ sister in [place] and has 2nd & 3rd flr setup. Indp PTA. Owns/uses RW, shower chair. Hx STR through [facility] and [facility] - Bipolar, GARRET, depression and receives treatment. No IP psych stays. No hx OP PT/HH/DA. Pt prefers to use [facility] pharmacy. Family will transport at discharge.

## 2025-02-18 NOTE — PROGRESS NOTES
Progress Note - Nephrology   Name: Ayleen Moralez 64 y.o. female I MRN: [id number]  Unit/Bed#: -01 I Date of Admission: 2/17/2025   Date of Service: 2/18/2025 I Hospital Day: 1     Assessment & Plan  Acute kidney injury superimposed on stage 4 chronic kidney disease (HCC)  Etiology: Prerenal in the setting of GI losses inability to take oral fluids possible mild effect of amiloride.  Baseline creatinine approximately 2.6.  Follows with Dr. Mills.  Creatinine 3.44 on admission 2/17/2024.  Repeat creatinine improved to 3.1.  UA: bland.   CK level normal.   CT scan with simple renal cysts.   Of note patient with left wrist AV fistula.    Recommend:  - Hold amiloride.  - Lactated Ringer's.  Hypokalemia  -Replete/magnesium low at 1.8.  Essential (primary) hypertension  BP improved, continue home medication:   - Amlodipine 5 mg daily  -Carvedilol 25 twice daily  -Hold amiloride for now even CAM  Metabolic acidosis  Normal anion gap secondary to diarrhea.  -  continue on oral sodium bicarbonate along with lactated Ringer's and follow.  Diarrhea  Possible viral gastroenteritis including nausea vomiting and diarrhea per primary service.  -CT scan with mild left sided colitis.   -Stool studies pending.     PLAN:   -Acute kidney injury, Improving, Continue on LR, Holding Amiloride.  -Hypokalemia, the setting of diarrhea, mag level low, continue to monitor and replace as needed.  -Hypertension, blood pressure improved, continue amlodipine and carvedilol.  -Acidosis, in the setting of diarrhea, continue on oral sodium bicarbonate.  -Diarrhea/left-sided colitis, stool studies pending, continue symptom management and IV fluids at this time.  -BMP in am.     Subjective     Denies chest pain or shortness of breath.  Reports having 10 episodes of diarrheal stools overnight.  Denies nausea or vomiting.    Objective :  Temp:  [97.2 °F (36.2 °C)-98.3 °F (36.8 °C)] 97.2 °F (36.2 °C)  HR:  [68-87] 87  BP: (117-162)/(61-83)  117/61  Resp:  [18-20] 20  SpO2:  [93 %-97 %] 94 %  O2 Device: None (Room air)    Current Weight:    First Weight:    I/O         02/16 0701  02/17 0700 02/17 0701  02/18 0700 02/18 0701  02/19 0700    P.O.  480 480    IV Piggyback  1000     Total Intake  1480 480    Urine  950     Stool  0     Total Output  950     Net  +530 +480           Unmeasured Urine Occurrence  4 x 1 x    Unmeasured Stool Occurrence  6 x           Physical Exam  Vitals reviewed.   Constitutional:       Appearance: Normal appearance.   HENT:      Head: Normocephalic.      Nose: Nose normal.      Mouth/Throat:      Mouth: Mucous membranes are moist.   Cardiovascular:      Heart sounds: Normal heart sounds.   Pulmonary:      Breath sounds: Normal breath sounds.   Abdominal:      Palpations: Abdomen is soft.   Musculoskeletal:      Right lower leg: No edema.      Left lower leg: No edema.   Skin:     General: Skin is warm and dry.   Neurological:      General: No focal deficit present.      Mental Status: She is alert. Mental status is at baseline.   Psychiatric:         Mood and Affect: Mood normal.         Medications:    Current Facility-Administered Medications:     acetaminophen (TYLENOL) tablet 650 mg, 650 mg, Oral, Q6H PRN, Zaira Robledo PA-C    albuterol (PROVENTIL HFA,VENTOLIN HFA) inhaler 2 puff, 2 puff, Inhalation, Q6H PRN, Zaira Robledo PA-C    [Held by provider] AMILoride tablet 5 mg, 5 mg, Oral, BID, Zaira Robledo PA-C    amLODIPine (NORVASC) tablet 5 mg, 5 mg, Oral, Daily, Zaira Robledo PA-C    aspirin chewable tablet 81 mg, 81 mg, Oral, Daily, Zaira Robledo PA-C, 81 mg at 02/18/25 0845    atorvastatin (LIPITOR) tablet 40 mg, 40 mg, Oral, Daily With Dinner, Zaira Robledo PA-C, 40 mg at 02/17/25 1619    budesonide-formoterol (SYMBICORT) 160-4.5 mcg/act inhaler 2 puff, 2 puff, Inhalation, BID, Zaira Robledo PA-C, 2 puff at 02/18/25 0807     calcium carbonate (TUMS) chewable tablet 500 mg, 500 mg, Oral, Daily PRN, Zaira Robledo PA-C    carvedilol (COREG) tablet 25 mg, 25 mg, Oral, BID With Meals, Zaira Robledo PA-C    Cholecalciferol (VITAMIN D3) tablet 5,000 Units, 5,000 Units, Oral, Daily, MOON Fernandez-C, 5,000 Units at 02/18/25 0847    clonazePAM (KlonoPIN) tablet 0.5 mg, 0.5 mg, Oral, BID PRN, Zaira Robledo PA-C    dicyclomine (BENTYL) capsule 10 mg, 10 mg, Oral, 4x Daily (AC & HS), MOON Fernandez-C, 10 mg at 02/18/25 0608    ferrous sulfate tablet 325 mg, 325 mg, Oral, Daily With Breakfast, MOON Fernandez-C, 325 mg at 02/18/25 0847    fluvoxaMINE (LUVOX) tablet 100 mg, 100 mg, Oral, Daily, MOON Fernandez-C, 100 mg at 02/18/25 0846    fluvoxaMINE (LUVOX) tablet 200 mg, 200 mg, Oral, HS, MOON Fernandez-C, 200 mg at 02/17/25 2150    heparin (porcine) subcutaneous injection 5,000 Units, 5,000 Units, Subcutaneous, Q8H PETE, MONO Fernandez-C, 5,000 Units at 02/17/25 2150    lactated ringers infusion, 75 mL/hr, Intravenous, Continuous, Ruben Moffett PA-C, Last Rate: 75 mL/hr at 02/18/25 0847, 75 mL/hr at 02/18/25 0847    lamoTRIgine (LaMICtal) tablet 200 mg, 200 mg, Oral, BID, MOON Fernandez-C, 200 mg at 02/18/25 0847    montelukast (SINGULAIR) tablet 10 mg, 10 mg, Oral, HS, Zaira Robledo PA-C, 10 mg at 02/17/25 2151    ondansetron (ZOFRAN) injection 4 mg, 4 mg, Intravenous, Q4H PRN, Zaira Robledo PA-C    pantoprazole (PROTONIX) EC tablet 40 mg, 40 mg, Oral, Early Morning, Zaira Robledo PA-C, 40 mg at 02/18/25 0608    QUEtiapine (SEROquel) tablet 100 mg, 100 mg, Oral, Daily, Zaira Robledo PA-C, 100 mg at 02/18/25 0846    QUEtiapine (SEROquel) tablet 500 mg, 500 mg, Oral, HS, Zaira Robledo PA-C, 500 mg at 02/17/25 2150    sodium bicarbonate  "tablet 1,300 mg, 1,300 mg, Oral, BID after meals, Zaira Rolbedo PA-C, 1,300 mg at 02/18/25 0846      Lab Results: I have reviewed the following results:  Results from last 7 days   Lab Units 02/18/25  0434 02/17/25  1044   WBC Thousand/uL 6.29 6.40   HEMOGLOBIN g/dL 11.3* 12.3   HEMATOCRIT % 34.7* 38.8   PLATELETS Thousands/uL 172 204   POTASSIUM mmol/L 3.2* 3.5   CHLORIDE mmol/L 114* 111*   CO2 mmol/L 19* 20*   BUN mg/dL 24 29*   CREATININE mg/dL 3.18* 3.44*   CALCIUM mg/dL 9.2 9.5   MAGNESIUM mg/dL 1.8* 2.0   PHOSPHORUS mg/dL 3.2  --    ALBUMIN g/dL 3.5 3.9       Administrative Statements     Portions of the record may have been created with voice recognition software. Occasional wrong word or \"sound a like\" substitutions may have occurred due to the inherent limitations of voice recognition software. Read the chart carefully and recognize, using context, where substitutions have occurred.If you have any questions, please contact the dictating provider.  "

## 2025-02-18 NOTE — ASSESSMENT & PLAN NOTE
BP improved, continue home medication:   - Amlodipine 5 mg daily  -Carvedilol 25 twice daily  -Hold amiloride for now even CAM

## 2025-02-18 NOTE — PROGRESS NOTES
Progress Note - Hospitalist   Name: Ayleen Moralez 64 y.o. female I MRN: [id number]  Unit/Bed#: -01 I Date of Admission: 2/17/2025   Date of Service: 2/18/2025 I Hospital Day: 1    Assessment & Plan  Acute kidney injury superimposed on stage 4 chronic kidney disease (HCC)  Lab Results   Component Value Date    EGFR 14 02/18/2025    EGFR 13 02/17/2025    EGFR 13 02/10/2025    CREATININE 3.18 (H) 02/18/2025    CREATININE 3.44 (H) 02/17/2025    CREATININE 3.36 (H) 02/10/2025     Presented with GI illness x 1 week, likely viral in nature with associated poor p.o. intake  Creatinine baseline 2.5 (however notably was 3.36 on 2/10)  Creatinine on admission 3.44  UA bland  CK WNL  PVR - no retention   Retention protocol + increase hold parameters for antihypertensives  Nephrology following  Hold amiloride  Improving with IVF, continue  Essential (primary) hypertension  Home regimen: Amlodipine 5 mg daily, Coreg 25 mg twice daily, amiloride 5 mg twice daily  Hold Amoride given CAM  Continue amlodipine/Coreg with increased hold parameters given CAM  SBP currently stable  Moderate persistent asthma, uncomplicated  Not in acute exacerbation  Continue home inhalers  Continue home Singulair  Anxiety disorder, unspecified  Continue home Seroquel, Luvox, Lamictal, Klonopin  PDMP reviewed, 270 tabs 0.5 mg klonopin filled 1/7 x 90 days   Lamictal level pending  Hypokalemia  Replete as needed  Diarrhea  Patient was seen by PCP on 2/14 for general malaise, arthralgias, nausea/vomiting, diarrhea x 1 week  Not meeting SIRS/sepsis  Suspected viral etiology  CT A/P: Mild left-sided colitis.  Stool studies pending  Supportive care and IV fluid  Advance diet as tolerated  Monitor off antibiotics  Metabolic acidosis  Bicarb 20  Sodium bicarb tablets ordered, continue LR  Lactic acid WNL  Monitor BMP tomorrow    VTE Pharmacologic Prophylaxis:   Moderate Risk (Score 3-4) - Pharmacological DVT Prophylaxis Ordered: heparin.    Mobility:    Basic Mobility Inpatient Raw Score: 24  JH-HLM Goal: 8: Walk 250 feet or more  JH-HLM Achieved: 7: Walk 25 feet or more  JH-HLM Goal NOT achieved. Continue with multidisciplinary rounding and encourage appropriate mobility to improve upon JH-HLM goals.    Patient Centered Rounds: I performed bedside rounds with nursing staff today.   Discussions with Specialists or Other Care Team Provider: Nephrology    Education and Discussions with Family / Patient: Attempted to update  (sister) via phone. Unable to contact.    Current Length of Stay: 1 day(s)  Current Patient Status: Inpatient   Certification Statement: The patient will continue to require additional inpatient hospital stay due to renal recovery  Discharge Plan: Anticipate discharge in 48-72 hrs to home.    Code Status: Level 1 - Full Code    Subjective   Patient continues to have diarrhea, reported 10 episodes overnight.    Objective :  Temp:  [97.2 °F (36.2 °C)] 97.2 °F (36.2 °C)  HR:  [77-87] 87  BP: (117-140)/(61-69) 117/61  Resp:  [20] 20  SpO2:  [93 %-94 %] 94 %  O2 Device: None (Room air)    Body mass index is 30.48 kg/m².     Input and Output Summary (last 24 hours):     Intake/Output Summary (Last 24 hours) at 2/18/2025 1328  Last data filed at 2/18/2025 1200  Gross per 24 hour   Intake 3649.17 ml   Output 1550 ml   Net 2099.17 ml       Physical Exam  Vitals and nursing note reviewed.   Constitutional:       Appearance: Normal appearance.   HENT:      Head: Normocephalic and atraumatic.      Mouth/Throat:      Mouth: Mucous membranes are moist.      Pharynx: Oropharynx is clear. No oropharyngeal exudate.   Eyes:      Extraocular Movements: Extraocular movements intact.   Cardiovascular:      Rate and Rhythm: Normal rate and regular rhythm.      Pulses: Normal pulses.      Heart sounds: Normal heart sounds. No murmur heard.     No friction rub. No gallop.   Pulmonary:      Effort: Pulmonary effort is normal. No respiratory distress.       Breath sounds: Normal breath sounds. No stridor. No wheezing or rales.   Abdominal:      General: Abdomen is flat. Bowel sounds are normal. There is no distension.      Palpations: Abdomen is soft.      Tenderness: There is no abdominal tenderness.   Musculoskeletal:      Right lower leg: No edema.      Left lower leg: No edema.   Skin:     General: Skin is warm and dry.   Neurological:      General: No focal deficit present.      Mental Status: She is alert and oriented to person, place, and time.           Lines/Drains:              Lab Results: I have reviewed the following results:   Results from last 7 days   Lab Units 02/18/25  0434   WBC Thousand/uL 6.29   HEMOGLOBIN g/dL 11.3*   HEMATOCRIT % 34.7*   PLATELETS Thousands/uL 172   SEGS PCT % 57   LYMPHO PCT % 28   MONO PCT % 10   EOS PCT % 4     Results from last 7 days   Lab Units 02/18/25  0434   SODIUM mmol/L 142   POTASSIUM mmol/L 3.2*   CHLORIDE mmol/L 114*   CO2 mmol/L 19*   BUN mg/dL 24   CREATININE mg/dL 3.18*   ANION GAP mmol/L 9   CALCIUM mg/dL 9.2   ALBUMIN g/dL 3.5   TOTAL BILIRUBIN mg/dL 0.35   ALK PHOS U/L 94   ALT U/L 15   AST U/L 19   GLUCOSE RANDOM mg/dL 94         Results from last 7 days   Lab Units 02/17/25  1117   POC GLUCOSE mg/dl 112         Results from last 7 days   Lab Units 02/17/25  1632   LACTIC ACID mmol/L 0.3*       Recent Cultures (last 7 days):   Results from last 7 days   Lab Units 02/17/25  1749   C DIFF TOXIN B BY PCR  Negative       Imaging Results Review: No pertinent imaging studies reviewed.  Other Study Results Review: No additional pertinent studies reviewed.    Last 24 Hours Medication List:     Current Facility-Administered Medications:     acetaminophen (TYLENOL) tablet 650 mg, Q6H PRN    albuterol (PROVENTIL HFA,VENTOLIN HFA) inhaler 2 puff, Q6H PRN    [Held by provider] AMILoride tablet 5 mg, BID    amLODIPine (NORVASC) tablet 5 mg, Daily    aspirin chewable tablet 81 mg, Daily    atorvastatin (LIPITOR) tablet 40  mg, Daily With Dinner    budesonide-formoterol (SYMBICORT) 160-4.5 mcg/act inhaler 2 puff, BID    calcium carbonate (TUMS) chewable tablet 500 mg, Daily PRN    carvedilol (COREG) tablet 25 mg, BID With Meals    Cholecalciferol (VITAMIN D3) tablet 5,000 Units, Daily    clonazePAM (KlonoPIN) tablet 0.5 mg, BID PRN    dicyclomine (BENTYL) capsule 10 mg, 4x Daily (AC & HS)    ferrous sulfate tablet 325 mg, Daily With Breakfast    fluvoxaMINE (LUVOX) tablet 100 mg, Daily    fluvoxaMINE (LUVOX) tablet 200 mg, HS    heparin (porcine) subcutaneous injection 5,000 Units, Q8H PETE    lamoTRIgine (LaMICtal) tablet 200 mg, BID    montelukast (SINGULAIR) tablet 10 mg, HS    ondansetron (ZOFRAN) injection 4 mg, Q4H PRN    pantoprazole (PROTONIX) EC tablet 40 mg, Early Morning    potassium chloride (Klor-Con M20) CR tablet 40 mEq, Once    QUEtiapine (SEROquel) tablet 100 mg, Daily    QUEtiapine (SEROquel) tablet 500 mg, HS    sodium bicarbonate 150 mEq in dextrose 5 % 1,000 mL infusion, Continuous    sodium bicarbonate tablet 1,300 mg, TID after meals    Administrative Statements   Today, Patient Was Seen By: Ruben Moffett PA-C      **Please Note: This note may have been constructed using a voice recognition system.**

## 2025-02-18 NOTE — PLAN OF CARE
Problem: Potential for Falls  Goal: Patient will remain free of falls  Description: INTERVENTIONS:  - Educate patient/family on patient safety including physical limitations  - Instruct patient to call for assistance with activity   - Consult OT/PT to assist with strengthening/mobility   - Keep Call bell within reach  - Keep bed low and locked with side rails adjusted as appropriate  - Keep care items and personal belongings within reach  - Initiate and maintain comfort rounds  - Make Fall Risk Sign visible to staff  - Offer Toileting every 2 Hours, in advance of need  - Initiate/Maintain bed alarm  - Obtain necessary fall risk management equipment:    Problem: PAIN - ADULT  Goal: Verbalizes/displays adequate comfort level or baseline comfort level  Description: Interventions:  - Encourage patient to monitor pain and request assistance  - Assess pain using appropriate pain scale  - Administer analgesics based on type and severity of pain and evaluate response  - Implement non-pharmacological measures as appropriate and evaluate response  - Consider cultural and social influences on pain and pain management  - Notify physician/advanced practitioner if interventions unsuccessful or patient reports new pain  Outcome: Progressing     - Apply yellow socks and bracelet for high fall risk patients  - Consider moving patient to room near nurses station  Outcome: Progressing

## 2025-02-18 NOTE — ASSESSMENT & PLAN NOTE
Possible viral gastroenteritis including nausea vomiting and diarrhea per primary service.  -CT scan with mild left sided colitis.   -Stool studies pending.

## 2025-02-18 NOTE — ASSESSMENT & PLAN NOTE
Lab Results   Component Value Date    EGFR 14 02/18/2025    EGFR 13 02/17/2025    EGFR 13 02/10/2025    CREATININE 3.18 (H) 02/18/2025    CREATININE 3.44 (H) 02/17/2025    CREATININE 3.36 (H) 02/10/2025     Presented with GI illness x 1 week, likely viral in nature with associated poor p.o. intake  Creatinine baseline 2.5 (however notably was 3.36 on 2/10)  Creatinine on admission 3.44  UA bland  CK WNL  PVR - no retention   Retention protocol + increase hold parameters for antihypertensives  Nephrology following  Hold amiloride  Improving with IVF, continue

## 2025-02-18 NOTE — ASSESSMENT & PLAN NOTE
Etiology: Prerenal in the setting of GI losses inability to take oral fluids possible mild effect of amiloride.  Baseline creatinine approximately 2.6.  Follows with Dr. Mills.  Creatinine 3.44 on admission 2/17/2024.  Repeat creatinine improved to 3.1.  UA: bland.   CK level normal.   CT scan with simple renal cysts.   Of note patient with left wrist AV fistula.    Recommend:  - Hold amiloride.  - Lactated Ringer's.

## 2025-02-19 PROBLEM — N17.9 AKI (ACUTE KIDNEY INJURY) (HCC): Status: ACTIVE | Noted: 2025-02-19

## 2025-02-19 PROBLEM — N18.9 CHRONIC KIDNEY DISEASE: Status: ACTIVE | Noted: 2025-02-19

## 2025-02-19 LAB
ANION GAP SERPL CALCULATED.3IONS-SCNC: 6 MMOL/L (ref 4–13)
BUN SERPL-MCNC: 21 MG/DL (ref 5–25)
CALCIUM SERPL-MCNC: 8.7 MG/DL (ref 8.4–10.2)
CHLORIDE SERPL-SCNC: 110 MMOL/L (ref 96–108)
CO2 SERPL-SCNC: 26 MMOL/L (ref 21–32)
CREAT SERPL-MCNC: 2.89 MG/DL (ref 0.6–1.3)
ERYTHROCYTE [DISTWIDTH] IN BLOOD BY AUTOMATED COUNT: 12.2 % (ref 11.6–15.1)
GFR SERPL CREATININE-BSD FRML MDRD: 16 ML/MIN/1.73SQ M
GLUCOSE SERPL-MCNC: 109 MG/DL (ref 65–140)
HCT VFR BLD AUTO: 36.3 % (ref 34.8–46.1)
HGB BLD-MCNC: 11.7 G/DL (ref 11.5–15.4)
MAGNESIUM SERPL-MCNC: 2.3 MG/DL (ref 1.9–2.7)
MCH RBC QN AUTO: 32.1 PG (ref 26.8–34.3)
MCHC RBC AUTO-ENTMCNC: 32.2 G/DL (ref 31.4–37.4)
MCV RBC AUTO: 100 FL (ref 82–98)
PLATELET # BLD AUTO: 179 THOUSANDS/UL (ref 149–390)
PMV BLD AUTO: 10.7 FL (ref 8.9–12.7)
POTASSIUM SERPL-SCNC: 3.3 MMOL/L (ref 3.5–5.3)
RBC # BLD AUTO: 3.65 MILLION/UL (ref 3.81–5.12)
SODIUM SERPL-SCNC: 142 MMOL/L (ref 135–147)
WBC # BLD AUTO: 7.44 THOUSAND/UL (ref 4.31–10.16)

## 2025-02-19 PROCEDURE — 83735 ASSAY OF MAGNESIUM: CPT | Performed by: INTERNAL MEDICINE

## 2025-02-19 PROCEDURE — 99232 SBSQ HOSP IP/OBS MODERATE 35: CPT | Performed by: INTERNAL MEDICINE

## 2025-02-19 PROCEDURE — 85027 COMPLETE CBC AUTOMATED: CPT | Performed by: INTERNAL MEDICINE

## 2025-02-19 PROCEDURE — 99232 SBSQ HOSP IP/OBS MODERATE 35: CPT | Performed by: PHYSICIAN ASSISTANT

## 2025-02-19 PROCEDURE — 80048 BASIC METABOLIC PNL TOTAL CA: CPT | Performed by: INTERNAL MEDICINE

## 2025-02-19 RX ORDER — POTASSIUM CHLORIDE 1500 MG/1
40 TABLET, EXTENDED RELEASE ORAL ONCE
Status: COMPLETED | OUTPATIENT
Start: 2025-02-19 | End: 2025-02-19

## 2025-02-19 RX ORDER — SODIUM CHLORIDE, SODIUM LACTATE, POTASSIUM CHLORIDE, CALCIUM CHLORIDE 600; 310; 30; 20 MG/100ML; MG/100ML; MG/100ML; MG/100ML
75 INJECTION, SOLUTION INTRAVENOUS CONTINUOUS
Status: DISCONTINUED | OUTPATIENT
Start: 2025-02-19 | End: 2025-02-20

## 2025-02-19 RX ADMIN — FLUVOXAMINE MALEATE 100 MG: 50 TABLET ORAL at 08:26

## 2025-02-19 RX ADMIN — PANTOPRAZOLE SODIUM 40 MG: 40 TABLET, DELAYED RELEASE ORAL at 05:54

## 2025-02-19 RX ADMIN — BUDESONIDE AND FORMOTEROL FUMARATE DIHYDRATE 2 PUFF: 160; 4.5 AEROSOL RESPIRATORY (INHALATION) at 08:31

## 2025-02-19 RX ADMIN — POTASSIUM CHLORIDE 40 MEQ: 1500 TABLET, EXTENDED RELEASE ORAL at 09:45

## 2025-02-19 RX ADMIN — ACETAMINOPHEN 650 MG: 325 TABLET, FILM COATED ORAL at 18:27

## 2025-02-19 RX ADMIN — DICYCLOMINE HYDROCHLORIDE 10 MG: 10 CAPSULE ORAL at 11:29

## 2025-02-19 RX ADMIN — FLUVOXAMINE MALEATE 200 MG: 50 TABLET ORAL at 21:04

## 2025-02-19 RX ADMIN — Medication 5000 UNITS: at 08:26

## 2025-02-19 RX ADMIN — HEPARIN SODIUM 5000 UNITS: 5000 INJECTION INTRAVENOUS; SUBCUTANEOUS at 21:04

## 2025-02-19 RX ADMIN — HEPARIN SODIUM 5000 UNITS: 5000 INJECTION INTRAVENOUS; SUBCUTANEOUS at 05:54

## 2025-02-19 RX ADMIN — SODIUM CHLORIDE, SODIUM LACTATE, POTASSIUM CHLORIDE, AND CALCIUM CHLORIDE 75 ML/HR: .6; .31; .03; .02 INJECTION, SOLUTION INTRAVENOUS at 10:43

## 2025-02-19 RX ADMIN — CARVEDILOL 25 MG: 25 TABLET, FILM COATED ORAL at 18:08

## 2025-02-19 RX ADMIN — QUETIAPINE FUMARATE 100 MG: 100 TABLET ORAL at 08:26

## 2025-02-19 RX ADMIN — MONTELUKAST 10 MG: 10 TABLET, FILM COATED ORAL at 21:04

## 2025-02-19 RX ADMIN — LAMOTRIGINE 200 MG: 100 TABLET ORAL at 08:26

## 2025-02-19 RX ADMIN — ALBUTEROL SULFATE 2 PUFF: 90 AEROSOL, METERED RESPIRATORY (INHALATION) at 22:17

## 2025-02-19 RX ADMIN — DICYCLOMINE HYDROCHLORIDE 10 MG: 10 CAPSULE ORAL at 21:04

## 2025-02-19 RX ADMIN — ATORVASTATIN CALCIUM 40 MG: 40 TABLET, FILM COATED ORAL at 18:08

## 2025-02-19 RX ADMIN — DICYCLOMINE HYDROCHLORIDE 10 MG: 10 CAPSULE ORAL at 05:54

## 2025-02-19 RX ADMIN — LAMOTRIGINE 200 MG: 100 TABLET ORAL at 18:08

## 2025-02-19 RX ADMIN — HEPARIN SODIUM 5000 UNITS: 5000 INJECTION INTRAVENOUS; SUBCUTANEOUS at 14:24

## 2025-02-19 RX ADMIN — DICYCLOMINE HYDROCHLORIDE 10 MG: 10 CAPSULE ORAL at 18:08

## 2025-02-19 RX ADMIN — FERROUS SULFATE TAB 325 MG (65 MG ELEMENTAL FE) 325 MG: 325 (65 FE) TAB at 08:26

## 2025-02-19 RX ADMIN — QUETIAPINE 500 MG: 200 TABLET ORAL at 21:04

## 2025-02-19 RX ADMIN — SODIUM BICARBONATE 650 MG TABLET 1300 MG: at 08:26

## 2025-02-19 RX ADMIN — ASPIRIN 81 MG CHEWABLE TABLET 81 MG: 81 TABLET CHEWABLE at 08:26

## 2025-02-19 NOTE — ASSESSMENT & PLAN NOTE
Normal anion gap secondary to diarrhea.  Improved, will stop sodium bicarbonate drip and tablets with hypokalemia and monitor

## 2025-02-19 NOTE — ASSESSMENT & PLAN NOTE
BP improved, continue home medication:   - Amlodipine 5 mg daily  -Carvedilol 25 twice daily  -Hold amiloride for now given CAM

## 2025-02-19 NOTE — PROGRESS NOTES
Progress Note - Hospitalist   Name: Ayleen Moralez 64 y.o. female I MRN: [id number]  Unit/Bed#: -01 I Date of Admission: 2/17/2025   Date of Service: 2/19/2025 I Hospital Day: 2    Assessment & Plan  Acute kidney injury superimposed on stage 4 chronic kidney disease (HCC)  Lab Results   Component Value Date    EGFR 16 02/19/2025    EGFR 14 02/18/2025    EGFR 13 02/17/2025    CREATININE 2.89 (H) 02/19/2025    CREATININE 3.18 (H) 02/18/2025    CREATININE 3.44 (H) 02/17/2025     Presented with GI illness x 1 week, likely viral in nature with associated poor p.o. intake  Creatinine baseline around 2.6  Creatinine on admission 3.44  UA bland  CK WNL  PVR - no retention   Retention protocol + increase hold parameters for antihypertensives  Nephrology following  Hold amiloride  Improving with IVF, continue  Trend BMP  Diarrhea  Patient was seen by PCP on 2/14 for general malaise, arthralgias, nausea/vomiting, diarrhea x 1 week  Not meeting SIRS/sepsis  Suspected viral etiology  CT A/P: Mild left-sided colitis.  Stool studies negative  Trial imodium PRN   Supportive care and IV fluid  Improving per patient  Metabolic acidosis  Patient s/p sodium bicarb infusion  Suspect related to GI losses from diarrhea  Improving -nephrology following  Essential (primary) hypertension  Home regimen: Amlodipine 5 mg daily, Coreg 25 mg twice daily, amiloride 5 mg twice daily  Hold Amoride given CAM  Continue amlodipine/Coreg with increased hold parameters given CAM  SBP currently stable  Moderate persistent asthma, uncomplicated  Not in acute exacerbation  Continue home inhalers  Continue home Singulair  Anxiety disorder, unspecified  Continue home Seroquel, Luvox, Lamictal, Klonopin  PDMP reviewed, 270 tabs 0.5 mg klonopin filled 1/7 x 90 days   Lamictal level pending  Hypokalemia  Potassium 3.3 this morning  Replete as needed    VTE Pharmacologic Prophylaxis: VTE Score: 3 Moderate Risk (Score 3-4) - Pharmacological DVT  Prophylaxis Ordered: heparin.    Mobility:   Basic Mobility Inpatient Raw Score: 24  JH-HLM Goal: 8: Walk 250 feet or more  JH-HLM Achieved: 8: Walk 250 feet ot more  JH-HLM Goal achieved. Continue to encourage appropriate mobility.    Patient Centered Rounds: I performed bedside rounds with nursing staff today.   Discussions with Specialists or Other Care Team Provider: RAMSES    Education and Discussions with Family / Patient: Patient declined call to .     Current Length of Stay: 2 day(s)  Current Patient Status: Inpatient   Certification Statement: The patient will continue to require additional inpatient hospital stay due to CAM, IVF  Discharge Plan: Anticipate discharge in 24-48 hrs to home.    Code Status: Level 1 - Full Code    Subjective   Patient continues to feel better each day, still with several episodes of nonbloody diarrhea overnight.  Denies chest pain/palpitations, shortness of breath, nausea/vomiting or abdominal pain.  Appetite slowly improving.    Objective :  Temp:  [96.4 °F (35.8 °C)-98.2 °F (36.8 °C)] 98.2 °F (36.8 °C)  HR:  [] 72  BP: (107-163)/(51-88) 119/51  Resp:  [18-20] 20  SpO2:  [92 %-94 %] 92 %  O2 Device: None (Room air)    Body mass index is 30.42 kg/m².     Input and Output Summary (last 24 hours):     Intake/Output Summary (Last 24 hours) at 2/19/2025 1426  Last data filed at 2/19/2025 1220  Gross per 24 hour   Intake 1265 ml   Output 500 ml   Net 765 ml       Physical Exam  Vitals and nursing note reviewed.   Constitutional:       Appearance: Normal appearance.      Comments: No acute distress   HENT:      Head: Normocephalic.   Eyes:      General: No scleral icterus.     Extraocular Movements: Extraocular movements intact.      Conjunctiva/sclera: Conjunctivae normal.   Cardiovascular:      Rate and Rhythm: Normal rate and regular rhythm.   Pulmonary:      Effort: Pulmonary effort is normal.      Breath sounds: Normal breath sounds. No wheezing, rhonchi or  rales.   Abdominal:      General: Bowel sounds are normal.      Palpations: Abdomen is soft.      Tenderness: There is no abdominal tenderness. There is no guarding or rebound.   Musculoskeletal:         General: No swelling, tenderness or deformity.      Cervical back: Normal range of motion.      Comments: Able to move upper/lower extremities bilaterally, no edema   Skin:     General: Skin is warm and dry.   Neurological:      Mental Status: She is alert and oriented to person, place, and time.   Psychiatric:         Mood and Affect: Mood normal.         Speech: Speech normal.         Behavior: Behavior normal.           Lines/Drains:              Lab Results: I have reviewed the following results:   Results from last 7 days   Lab Units 02/19/25  0536 02/18/25  0434   WBC Thousand/uL 7.44 6.29   HEMOGLOBIN g/dL 11.7 11.3*   HEMATOCRIT % 36.3 34.7*   PLATELETS Thousands/uL 179 172   SEGS PCT %  --  57   LYMPHO PCT %  --  28   MONO PCT %  --  10   EOS PCT %  --  4     Results from last 7 days   Lab Units 02/19/25  0536 02/18/25  0434   SODIUM mmol/L 142 142   POTASSIUM mmol/L 3.3* 3.2*   CHLORIDE mmol/L 110* 114*   CO2 mmol/L 26 19*   BUN mg/dL 21 24   CREATININE mg/dL 2.89* 3.18*   ANION GAP mmol/L 6 9   CALCIUM mg/dL 8.7 9.2   ALBUMIN g/dL  --  3.5   TOTAL BILIRUBIN mg/dL  --  0.35   ALK PHOS U/L  --  94   ALT U/L  --  15   AST U/L  --  19   GLUCOSE RANDOM mg/dL 109 94         Results from last 7 days   Lab Units 02/17/25  1117   POC GLUCOSE mg/dl 112         Results from last 7 days   Lab Units 02/17/25  1632   LACTIC ACID mmol/L 0.3*       Recent Cultures (last 7 days):   Results from last 7 days   Lab Units 02/17/25  1749   C DIFF TOXIN B BY PCR  Negative       Imaging Results Review: I reviewed radiology reports from this admission including: CT abdomen/pelvis.  Other Study Results Review: No additional pertinent studies reviewed.    Last 24 Hours Medication List:     Current Facility-Administered Medications:      acetaminophen (TYLENOL) tablet 650 mg, Q6H PRN    albuterol (PROVENTIL HFA,VENTOLIN HFA) inhaler 2 puff, Q6H PRN    [Held by provider] AMILoride tablet 5 mg, BID    amLODIPine (NORVASC) tablet 5 mg, Daily    aspirin chewable tablet 81 mg, Daily    atorvastatin (LIPITOR) tablet 40 mg, Daily With Dinner    bismuth subsalicylate (PEPTO BISMOL) oral suspension 524 mg, Q6H PRN    budesonide-formoterol (SYMBICORT) 160-4.5 mcg/act inhaler 2 puff, BID    calcium carbonate (TUMS) chewable tablet 500 mg, Daily PRN    carvedilol (COREG) tablet 25 mg, BID With Meals    Cholecalciferol (VITAMIN D3) tablet 5,000 Units, Daily    clonazePAM (KlonoPIN) tablet 0.5 mg, BID PRN    dicyclomine (BENTYL) capsule 10 mg, 4x Daily (AC & HS)    ferrous sulfate tablet 325 mg, Daily With Breakfast    fluvoxaMINE (LUVOX) tablet 100 mg, Daily    fluvoxaMINE (LUVOX) tablet 200 mg, HS    heparin (porcine) subcutaneous injection 5,000 Units, Q8H PETE    lactated ringers infusion, Continuous, Last Rate: 75 mL/hr (02/19/25 1043)    lamoTRIgine (LaMICtal) tablet 200 mg, BID    loperamide (IMODIUM) capsule 2 mg, 4x Daily PRN    montelukast (SINGULAIR) tablet 10 mg, HS    ondansetron (ZOFRAN) injection 4 mg, Q4H PRN    pantoprazole (PROTONIX) EC tablet 40 mg, Early Morning    QUEtiapine (SEROquel) tablet 100 mg, Daily    QUEtiapine (SEROquel) tablet 500 mg, HS    Administrative Statements   Today, Patient Was Seen By: Amy Perez PA-C  I have spent a total time of 35 minutes in caring for this patient on the day of the visit/encounter including Diagnostic results, Instructions for management, Patient and family education, Importance of tx compliance, Risk factor reductions, Impressions, Counseling / Coordination of care, Documenting in the medical record, Reviewing/placing orders in the medical record (including tests, medications, and/or procedures), and Obtaining or reviewing history  .    **Please Note: This note may have been constructed  using a voice recognition system.**

## 2025-02-19 NOTE — PROGRESS NOTES
Progress Note - Nephrology   Name: Ayleen Moralez 64 y.o. female I MRN: [id number]  Unit/Bed#: [facility]-01 I Date of Admission: 2/17/2025   Date of Service: 2/19/2025 I Hospital Day: 2     Assessment & Plan  Acute kidney injury superimposed on stage 4 chronic kidney disease (HCC)  Etiology: Prerenal in the setting of GI losses inability to take oral fluids possible mild effect of amiloride.  Baseline creatinine approximately 2.6.  Follows with Dr. Mills.  Creatinine 3.44 on admission 2/17/2024.  Creatinine improving to 2.9 today  UA: bland.   CK level normal.   CT scan with simple renal cysts.   Of note patient with left wrist AV fistula.    Recommend:  - Hold amiloride.  - Change IVF to Lactated Ringer's  Hypokalemia  - mag repleted  - K remains low, replete again today with 40meq x1  Metabolic acidosis  Normal anion gap secondary to diarrhea.  Improved, will stop sodium bicarbonate drip and tablets with hypokalemia and monitor   Essential (primary) hypertension  BP improved, continue home medication:   - Amlodipine 5 mg daily  -Carvedilol 25 twice daily  -Hold amiloride for now given CAM  Diarrhea  Possible viral gastroenteritis including nausea vomiting and diarrhea per primary service.  -CT scan with mild left sided colitis.   -C diff negative      Nephrology service will follow.    Subjective   Patient had diarrhea still this morning.  Not eating much due to diarrhea/vomiting.  Denies sob.  Feels fingers are swollen.  Understands plan.     Objective :  Temp:  [96.4 °F (35.8 °C)-97 °F (36.1 °C)] 97 °F (36.1 °C)  HR:  [] 84  BP: (107-163)/(53-88) 155/70  Resp:  [18] 18  SpO2:  [92 %-94 %] 92 %  O2 Device: None (Room air)    Current Weight: Weight - Scale: 88.1 kg (194 lb 3.2 oz)  First Weight: Weight - Scale: 88.3 kg (194 lb 9.6 oz)  I/O         02/17 0701  02/18 0700 02/18 0701  02/19 0700 02/19 0701  02/20 0700    P.O. 480 1030     I.V. (mL/kg)  2054.2 (23.3)     IV Piggyback 1000 50     Total  Intake(mL/kg) 1480 3134.2 (35.6)     Urine (mL/kg/hr) 950 1100 (0.5)     Stool 0 0     Total Output 950 1100     Net +530 +2034.2            Unmeasured Urine Occurrence 4 x 2 x 1 x    Unmeasured Stool Occurrence 6 x 14 x           Physical Exam  General: NAD  Neuro: alert awake  Psych: mood and affect appropriate  Skin: no rash  Eyes: anicteric  ENMT: mm moist  Neck: no masses  Respiratory: ctab  Cardiovascular: rrr  Extremities: no edema  Gastrointestinal: soft nt nd    Medications:    Current Facility-Administered Medications:     acetaminophen (TYLENOL) tablet 650 mg, 650 mg, Oral, Q6H PRN, Zaira Robledo PA-C, 650 mg at 02/18/25 2337    albuterol (PROVENTIL HFA,VENTOLIN HFA) inhaler 2 puff, 2 puff, Inhalation, Q6H PRN, Zaira Robledo PA-C    [Held by provider] AMILoride tablet 5 mg, 5 mg, Oral, BID, Zaira Robledo PA-C    amLODIPine (NORVASC) tablet 5 mg, 5 mg, Oral, Daily, Zaira Robledo PA-C    aspirin chewable tablet 81 mg, 81 mg, Oral, Daily, Zaira Robledo PA-C, 81 mg at 02/19/25 0826    atorvastatin (LIPITOR) tablet 40 mg, 40 mg, Oral, Daily With Dinner, Zaira Robledo PA-C, 40 mg at 02/18/25 1731    bismuth subsalicylate (PEPTO BISMOL) oral suspension 524 mg, 524 mg, Oral, Q6H PRN, Jenaro Cox PA-C, 524 mg at 02/18/25 2348    budesonide-formoterol (SYMBICORT) 160-4.5 mcg/act inhaler 2 puff, 2 puff, Inhalation, BID, Zaira Robledo PA-C, 2 puff at 02/19/25 0831    calcium carbonate (TUMS) chewable tablet 500 mg, 500 mg, Oral, Daily PRN, Jenaro Cox PA-C, 500 mg at 02/18/25 2348    carvedilol (COREG) tablet 25 mg, 25 mg, Oral, BID With Meals, Zaira Robledo PA-C, 25 mg at 02/18/25 1731    Cholecalciferol (VITAMIN D3) tablet 5,000 Units, 5,000 Units, Oral, Daily, Zaira Robledo PA-C, 5,000 Units at 02/19/25 0851    clonazePAM (KlonoPIN) tablet 0.5 mg, 0.5 mg, Oral, BID PRN, Zaira Boston  SHASHANK Robledo    dicyclomine (BENTYL) capsule 10 mg, 10 mg, Oral, 4x Daily (AC & HS), Zaira Robledo PA-C, 10 mg at 02/19/25 0554    ferrous sulfate tablet 325 mg, 325 mg, Oral, Daily With Breakfast, Zaira Robledo PA-C, 325 mg at 02/19/25 0826    fluvoxaMINE (LUVOX) tablet 100 mg, 100 mg, Oral, Daily, MOON Fernandez-C, 100 mg at 02/19/25 0826    fluvoxaMINE (LUVOX) tablet 200 mg, 200 mg, Oral, HS, Zaira Robledo PA-C, 200 mg at 02/18/25 2139    heparin (porcine) subcutaneous injection 5,000 Units, 5,000 Units, Subcutaneous, Q8H PETE, Zaira Robledo PA-C, 5,000 Units at 02/19/25 0554    lamoTRIgine (LaMICtal) tablet 200 mg, 200 mg, Oral, BID, Zaira Robledo PA-C, 200 mg at 02/19/25 0826    loperamide (IMODIUM) capsule 2 mg, 2 mg, Oral, 4x Daily PRN, Jenaro Cox PA-C, 2 mg at 02/18/25 2348    montelukast (SINGULAIR) tablet 10 mg, 10 mg, Oral, HS, Zaira Robledo PA-C, 10 mg at 02/18/25 2139    ondansetron (ZOFRAN) injection 4 mg, 4 mg, Intravenous, Q4H PRN, Zaira Robledo PA-C    pantoprazole (PROTONIX) EC tablet 40 mg, 40 mg, Oral, Early Morning, Zaira Robledo PA-C, 40 mg at 02/19/25 0554    potassium chloride (Klor-Con M20) CR tablet 40 mEq, 40 mEq, Oral, Once, Cher Rodriguez PA-C    QUEtiapine (SEROquel) tablet 100 mg, 100 mg, Oral, Daily, Zaira Robledo PA-C, 100 mg at 02/19/25 0826    QUEtiapine (SEROquel) tablet 500 mg, 500 mg, Oral, HS, Zaira Robledo PA-C, 500 mg at 02/18/25 2139    sodium bicarbonate 150 mEq in dextrose 5 % 1,000 mL infusion, 100 mL/hr, Intravenous, Continuous, Robinson Samuel MD, Last Rate: 100 mL/hr at 02/18/25 2334, 100 mL/hr at 02/18/25 2334      Lab Results: I have reviewed the following results:  Results from last 7 days   Lab Units 02/19/25  0536 02/18/25  0434 02/17/25  1044   WBC Thousand/uL 7.44 6.29 6.40   HEMOGLOBIN g/dL 11.7 11.3* 12.3    HEMATOCRIT % 36.3 34.7* 38.8   PLATELETS Thousands/uL 179 172 204   POTASSIUM mmol/L 3.3* 3.2* 3.5   CHLORIDE mmol/L 110* 114* 111*   CO2 mmol/L 26 19* 20*   BUN mg/dL 21 24 29*   CREATININE mg/dL 2.89* 3.18* 3.44*   CALCIUM mg/dL 8.7 9.2 9.5   MAGNESIUM mg/dL 2.3 1.8* 2.0   PHOSPHORUS mg/dL  --  3.2  --    ALBUMIN g/dL  --  3.5 3.9

## 2025-02-19 NOTE — ASSESSMENT & PLAN NOTE
Possible viral gastroenteritis including nausea vomiting and diarrhea per primary service.  -CT scan with mild left sided colitis.   -C diff negative

## 2025-02-19 NOTE — PLAN OF CARE

## 2025-02-19 NOTE — ASSESSMENT & PLAN NOTE
Lab Results   Component Value Date    EGFR 16 02/19/2025    EGFR 14 02/18/2025    EGFR 13 02/17/2025    CREATININE 2.89 (H) 02/19/2025    CREATININE 3.18 (H) 02/18/2025    CREATININE 3.44 (H) 02/17/2025     Presented with GI illness x 1 week, likely viral in nature with associated poor p.o. intake  Creatinine baseline around 2.6  Creatinine on admission 3.44  UA bland  CK WNL  PVR - no retention   Retention protocol + increase hold parameters for antihypertensives  Nephrology following  Hold amiloride  Improving with IVF, continue  Trend BMP

## 2025-02-19 NOTE — PLAN OF CARE

## 2025-02-19 NOTE — ASSESSMENT & PLAN NOTE
Etiology: Prerenal in the setting of GI losses inability to take oral fluids possible mild effect of amiloride.  Baseline creatinine approximately 2.6.  Follows with Dr. Mills.  Creatinine 3.44 on admission 2/17/2024.  Creatinine improving to 2.9 today  UA: bland.   CK level normal.   CT scan with simple renal cysts.   Of note patient with left wrist AV fistula.    Recommend:  - Hold amiloride.  - Change IVF to Lactated Ringer's

## 2025-02-19 NOTE — ASSESSMENT & PLAN NOTE
Patient was seen by PCP on 2/14 for general malaise, arthralgias, nausea/vomiting, diarrhea x 1 week  Not meeting SIRS/sepsis  Suspected viral etiology  CT A/P: Mild left-sided colitis.  Stool studies negative  Trial imodium PRN   Supportive care and IV fluid  Improving per patient

## 2025-02-19 NOTE — ASSESSMENT & PLAN NOTE
Patient s/p sodium bicarb infusion  Suspect related to GI losses from diarrhea  Improving -nephrology following

## 2025-02-20 PROBLEM — E87.20 METABOLIC ACIDOSIS: Status: RESOLVED | Noted: 2025-02-17 | Resolved: 2025-02-20

## 2025-02-20 LAB
ANION GAP SERPL CALCULATED.3IONS-SCNC: 7 MMOL/L (ref 4–13)
BUN SERPL-MCNC: 18 MG/DL (ref 5–25)
CALCIUM SERPL-MCNC: 8.1 MG/DL (ref 8.4–10.2)
CHLORIDE SERPL-SCNC: 109 MMOL/L (ref 96–108)
CO2 SERPL-SCNC: 26 MMOL/L (ref 21–32)
CREAT SERPL-MCNC: 2.63 MG/DL (ref 0.6–1.3)
GFR SERPL CREATININE-BSD FRML MDRD: 18 ML/MIN/1.73SQ M
GLUCOSE SERPL-MCNC: 104 MG/DL (ref 65–140)
LAMOTRIGINE SERPL-MCNC: 9.4 UG/ML (ref 2–20)
POTASSIUM SERPL-SCNC: 3.7 MMOL/L (ref 3.5–5.3)
SODIUM SERPL-SCNC: 142 MMOL/L (ref 135–147)

## 2025-02-20 PROCEDURE — 99232 SBSQ HOSP IP/OBS MODERATE 35: CPT | Performed by: PHYSICIAN ASSISTANT

## 2025-02-20 PROCEDURE — 99232 SBSQ HOSP IP/OBS MODERATE 35: CPT | Performed by: INTERNAL MEDICINE

## 2025-02-20 PROCEDURE — 80048 BASIC METABOLIC PNL TOTAL CA: CPT | Performed by: INTERNAL MEDICINE

## 2025-02-20 RX ORDER — CARVEDILOL 25 MG/1
25 TABLET ORAL 2 TIMES DAILY WITH MEALS
Status: DISCONTINUED | OUTPATIENT
Start: 2025-02-20 | End: 2025-02-21 | Stop reason: HOSPADM

## 2025-02-20 RX ORDER — AMILORIDE HYDROCHLORIDE 5 MG/1
5 TABLET ORAL DAILY
Status: DISCONTINUED | OUTPATIENT
Start: 2025-02-20 | End: 2025-02-21 | Stop reason: HOSPADM

## 2025-02-20 RX ADMIN — DICYCLOMINE HYDROCHLORIDE 10 MG: 10 CAPSULE ORAL at 17:32

## 2025-02-20 RX ADMIN — DICYCLOMINE HYDROCHLORIDE 10 MG: 10 CAPSULE ORAL at 21:38

## 2025-02-20 RX ADMIN — HEPARIN SODIUM 5000 UNITS: 5000 INJECTION INTRAVENOUS; SUBCUTANEOUS at 05:48

## 2025-02-20 RX ADMIN — FLUVOXAMINE MALEATE 200 MG: 50 TABLET ORAL at 21:37

## 2025-02-20 RX ADMIN — DICYCLOMINE HYDROCHLORIDE 10 MG: 10 CAPSULE ORAL at 05:47

## 2025-02-20 RX ADMIN — ASPIRIN 81 MG CHEWABLE TABLET 81 MG: 81 TABLET CHEWABLE at 08:08

## 2025-02-20 RX ADMIN — Medication 5000 UNITS: at 08:06

## 2025-02-20 RX ADMIN — DICYCLOMINE HYDROCHLORIDE 10 MG: 10 CAPSULE ORAL at 11:55

## 2025-02-20 RX ADMIN — MONTELUKAST 10 MG: 10 TABLET, FILM COATED ORAL at 21:38

## 2025-02-20 RX ADMIN — FLUVOXAMINE MALEATE 100 MG: 50 TABLET ORAL at 08:05

## 2025-02-20 RX ADMIN — LAMOTRIGINE 200 MG: 100 TABLET ORAL at 08:06

## 2025-02-20 RX ADMIN — HEPARIN SODIUM 5000 UNITS: 5000 INJECTION INTRAVENOUS; SUBCUTANEOUS at 13:00

## 2025-02-20 RX ADMIN — CARVEDILOL 25 MG: 25 TABLET, FILM COATED ORAL at 17:32

## 2025-02-20 RX ADMIN — AMLODIPINE BESYLATE 5 MG: 5 TABLET ORAL at 08:07

## 2025-02-20 RX ADMIN — CARVEDILOL 25 MG: 25 TABLET, FILM COATED ORAL at 08:08

## 2025-02-20 RX ADMIN — PANTOPRAZOLE SODIUM 40 MG: 40 TABLET, DELAYED RELEASE ORAL at 05:47

## 2025-02-20 RX ADMIN — ATORVASTATIN CALCIUM 40 MG: 40 TABLET, FILM COATED ORAL at 17:33

## 2025-02-20 RX ADMIN — LAMOTRIGINE 200 MG: 100 TABLET ORAL at 17:33

## 2025-02-20 RX ADMIN — AMILORIDE HYDROCLORIDE 5 MG: 5 TABLET ORAL at 15:14

## 2025-02-20 RX ADMIN — QUETIAPINE 500 MG: 200 TABLET ORAL at 21:37

## 2025-02-20 RX ADMIN — HEPARIN SODIUM 5000 UNITS: 5000 INJECTION INTRAVENOUS; SUBCUTANEOUS at 21:39

## 2025-02-20 RX ADMIN — QUETIAPINE FUMARATE 100 MG: 100 TABLET ORAL at 08:08

## 2025-02-20 RX ADMIN — FERROUS SULFATE TAB 325 MG (65 MG ELEMENTAL FE) 325 MG: 325 (65 FE) TAB at 08:06

## 2025-02-20 NOTE — ASSESSMENT & PLAN NOTE
Normal anion gap secondary to diarrhea.  Resolved, status post sodium bicarbonate drip and oral sodium bicarbonate.  Will continue to monitor and trend.

## 2025-02-20 NOTE — ASSESSMENT & PLAN NOTE
Possible viral gastroenteritis including nausea vomiting and diarrhea per primary service.  CT scan with mild left sided colitis.   C diff and enteric stool negative.

## 2025-02-20 NOTE — PROGRESS NOTES
Progress Note - Hospitalist   Name: Ayleen Moralez 64 y.o. female I MRN: [id number]  Unit/Bed#: -01 I Date of Admission: 2/17/2025   Date of Service: 2/20/2025 I Hospital Day: 3    Assessment & Plan  Acute kidney injury superimposed on stage 4 chronic kidney disease (HCC)  Lab Results   Component Value Date    EGFR 18 02/20/2025    EGFR 16 02/19/2025    EGFR 14 02/18/2025    CREATININE 2.63 (H) 02/20/2025    CREATININE 2.89 (H) 02/19/2025    CREATININE 3.18 (H) 02/18/2025     Presented with GI illness x 1 week, likely viral in nature with associated poor p.o. intake  Creatinine baseline around 2.6  Creatinine on admission 3.44  UA bland  CK WNL  PVR - no retention   Retention protocol + increase hold parameters for antihypertensives  Nephrology following  Hold amiloride  Improving with IVF, continue.  Nephro messaged re: weight gain from yesterday but clinically patient did not appear volume overloaded on exam  Trend BMP  Diarrhea  Patient was seen by PCP on 2/14 for general malaise, arthralgias, nausea/vomiting, diarrhea x 1 week  Not meeting SIRS/sepsis  Suspected viral etiology  CT A/P: Mild left-sided colitis.  Stool studies negative  Trial imodium PRN   Supportive care and IV fluid  Improving per patient  Metabolic acidosis (Resolved: 2/20/2025)  Patient s/p sodium bicarb infusion  Suspect related to GI losses from diarrhea  Resolved  Essential (primary) hypertension  Home regimen: Amlodipine 5 mg daily, Coreg 25 mg twice daily, amiloride 5 mg twice daily  Hold Amoride given CAM  Continue amlodipine/Coreg with increased hold parameters  SBP currently stable  Moderate persistent asthma, uncomplicated  Not in acute exacerbation  Continue home inhalers  Continue home Singulair  Anxiety disorder, unspecified  Continue home Seroquel, Luvox, Lamictal, Klonopin  PDMP reviewed, 270 tabs 0.5 mg klonopin filled 1/7 x 90 days   Lamictal level pending  Hypokalemia  Improved  Replete as needed    VTE Pharmacologic  Prophylaxis: VTE Score: 3 Moderate Risk (Score 3-4) - Pharmacological DVT Prophylaxis Ordered: heparin.    Mobility:   Basic Mobility Inpatient Raw Score: 24  JH-HLM Goal: 8: Walk 250 feet or more  JH-HLM Achieved: 8: Walk 250 feet ot more  JH-HLM Goal achieved. Continue to encourage appropriate mobility.    Patient Centered Rounds: I performed bedside rounds with nursing staff today.   Discussions with Specialists or Other Care Team Provider: CM, nephro AP    Education and Discussions with Family / Patient: Patient declined call to .     Current Length of Stay: 3 day(s)  Current Patient Status: Inpatient   Certification Statement: The patient will continue to require additional inpatient hospital stay due to monitoring kidney function  Discharge Plan: Anticipate discharge tomorrow to home.    Code Status: Level 1 - Full Code    Subjective   Patient continues to feel improved each day.  With intermittent nonbloody diarrhea but overall improved.  Appetite slowly improving, requesting diet advancement.    Objective :  Temp:  [97.4 °F (36.3 °C)-98.2 °F (36.8 °C)] 98.2 °F (36.8 °C)  HR:  [69-72] 69  BP: ()/(49-80) 158/80  Resp:  [20] 20  SpO2:  [89 %-92 %] 92 %  O2 Device: None (Room air)    Body mass index is 31.26 kg/m².     Input and Output Summary (last 24 hours):     Intake/Output Summary (Last 24 hours) at 2/20/2025 0959  Last data filed at 2/20/2025 0801  Gross per 24 hour   Intake 1560 ml   Output --   Net 1560 ml       Physical Exam  Vitals and nursing note reviewed.   Constitutional:       Appearance: Normal appearance.      Comments: No acute distress   HENT:      Head: Normocephalic.   Eyes:      General: No scleral icterus.     Extraocular Movements: Extraocular movements intact.      Conjunctiva/sclera: Conjunctivae normal.   Cardiovascular:      Rate and Rhythm: Normal rate and regular rhythm.   Pulmonary:      Effort: Pulmonary effort is normal.      Breath sounds: Normal breath  sounds. No wheezing, rhonchi or rales.   Abdominal:      General: Bowel sounds are normal.      Palpations: Abdomen is soft.      Tenderness: There is no abdominal tenderness. There is no guarding or rebound.   Musculoskeletal:         General: No swelling, tenderness or deformity.      Cervical back: Normal range of motion.      Comments: Able to move upper/lower ext bilaterally, no edema   Skin:     General: Skin is warm and dry.   Neurological:      Mental Status: She is alert and oriented to person, place, and time.   Psychiatric:         Mood and Affect: Mood normal.         Speech: Speech normal.         Behavior: Behavior normal.           Lines/Drains:              Lab Results: I have reviewed the following results:   Results from last 7 days   Lab Units 02/19/25  0536 02/18/25  0434   WBC Thousand/uL 7.44 6.29   HEMOGLOBIN g/dL 11.7 11.3*   HEMATOCRIT % 36.3 34.7*   PLATELETS Thousands/uL 179 172   SEGS PCT %  --  57   LYMPHO PCT %  --  28   MONO PCT %  --  10   EOS PCT %  --  4     Results from last 7 days   Lab Units 02/20/25  0313 02/19/25  0536 02/18/25  0434   SODIUM mmol/L 142   < > 142   POTASSIUM mmol/L 3.7   < > 3.2*   CHLORIDE mmol/L 109*   < > 114*   CO2 mmol/L 26   < > 19*   BUN mg/dL 18   < > 24   CREATININE mg/dL 2.63*   < > 3.18*   ANION GAP mmol/L 7   < > 9   CALCIUM mg/dL 8.1*   < > 9.2   ALBUMIN g/dL  --   --  3.5   TOTAL BILIRUBIN mg/dL  --   --  0.35   ALK PHOS U/L  --   --  94   ALT U/L  --   --  15   AST U/L  --   --  19   GLUCOSE RANDOM mg/dL 104   < > 94    < > = values in this interval not displayed.         Results from last 7 days   Lab Units 02/17/25  1117   POC GLUCOSE mg/dl 112         Results from last 7 days   Lab Units 02/17/25  1632   LACTIC ACID mmol/L 0.3*       Recent Cultures (last 7 days):   Results from last 7 days   Lab Units 02/17/25  1749   C DIFF TOXIN B BY PCR  Negative       Imaging Results Review: No pertinent imaging studies reviewed.  Other Study Results  Review: No additional pertinent studies reviewed.    Last 24 Hours Medication List:     Current Facility-Administered Medications:     acetaminophen (TYLENOL) tablet 650 mg, Q6H PRN    albuterol (PROVENTIL HFA,VENTOLIN HFA) inhaler 2 puff, Q6H PRN    [Held by provider] AMILoride tablet 5 mg, BID    amLODIPine (NORVASC) tablet 5 mg, Daily    aspirin chewable tablet 81 mg, Daily    atorvastatin (LIPITOR) tablet 40 mg, Daily With Dinner    bismuth subsalicylate (PEPTO BISMOL) oral suspension 524 mg, Q6H PRN    budesonide-formoterol (SYMBICORT) 160-4.5 mcg/act inhaler 2 puff, BID    calcium carbonate (TUMS) chewable tablet 500 mg, Daily PRN    carvedilol (COREG) tablet 25 mg, BID With Meals    Cholecalciferol (VITAMIN D3) tablet 5,000 Units, Daily    clonazePAM (KlonoPIN) tablet 0.5 mg, BID PRN    dicyclomine (BENTYL) capsule 10 mg, 4x Daily (AC & HS)    ferrous sulfate tablet 325 mg, Daily With Breakfast    fluvoxaMINE (LUVOX) tablet 100 mg, Daily    fluvoxaMINE (LUVOX) tablet 200 mg, HS    heparin (porcine) subcutaneous injection 5,000 Units, Q8H PETE    lactated ringers infusion, Continuous, Last Rate: 75 mL/hr (02/19/25 1043)    lamoTRIgine (LaMICtal) tablet 200 mg, BID    loperamide (IMODIUM) capsule 2 mg, 4x Daily PRN    montelukast (SINGULAIR) tablet 10 mg, HS    ondansetron (ZOFRAN) injection 4 mg, Q4H PRN    pantoprazole (PROTONIX) EC tablet 40 mg, Early Morning    QUEtiapine (SEROquel) tablet 100 mg, Daily    QUEtiapine (SEROquel) tablet 500 mg, HS    Administrative Statements   Today, Patient Was Seen By: Amy Perez PA-C  I have spent a total time of 35 minutes in caring for this patient on the day of the visit/encounter including Diagnostic results, Instructions for management, Patient and family education, Importance of tx compliance, Risk factor reductions, Impressions, Counseling / Coordination of care, Documenting in the medical record, Reviewing/placing orders in the medical record (including  tests, medications, and/or procedures), and Communicating with other healthcare professionals .    **Please Note: This note may have been constructed using a voice recognition system.**

## 2025-02-20 NOTE — ASSESSMENT & PLAN NOTE
Home regimen: Amlodipine 5 mg daily, Coreg 25 mg twice daily, amiloride 5 mg twice daily  Hold Amoride given CAM  Continue amlodipine/Coreg with increased hold parameters  SBP currently stable

## 2025-02-20 NOTE — ASSESSMENT & PLAN NOTE
Lab Results   Component Value Date    EGFR 18 02/20/2025    EGFR 16 02/19/2025    EGFR 14 02/18/2025    CREATININE 2.63 (H) 02/20/2025    CREATININE 2.89 (H) 02/19/2025    CREATININE 3.18 (H) 02/18/2025

## 2025-02-20 NOTE — ASSESSMENT & PLAN NOTE
Etiology: Prerenal in the setting of GI losses inability to take oral fluids possible mild effect of amiloride.  Baseline creatinine approximately 2.6.  Repeat creatinine today 2.6, at baseline.  Follows with Dr. Mills.  Creatinine 3.44 on admission 2/17/2024.  UA: bland.   CK level normal.   CT scan with simple renal cysts.   Of note patient with left wrist AV fistula.  Holding amiloride.  Discontinue IVF today. LE edema present.  5 lb weight gain.   Continue to avoid nephrotoxins, hypotension, IV contrast.   BMP in AM.

## 2025-02-20 NOTE — ASSESSMENT & PLAN NOTE
BP improved, prior episodes of hypotension.  Current medication: Amlodipine 5 mg daily, carvedilol 25 mg twice daily.  Holding amiloride.  Continue to avoid hypotension or high fluctuation in blood pressure.

## 2025-02-20 NOTE — PLAN OF CARE

## 2025-02-20 NOTE — PLAN OF CARE

## 2025-02-20 NOTE — PROGRESS NOTES
Progress Note - Nephrology   Name: Ayleen Moralez 64 y.o. female I MRN: [id number]  Unit/Bed#: -01 I Date of Admission: 2/17/2025   Date of Service: 2/20/2025 I Hospital Day: 3     Assessment & Plan  Acute kidney injury superimposed on stage 4 chronic kidney disease (HCC)  Etiology: Prerenal in the setting of GI losses inability to take oral fluids possible mild effect of amiloride.  Baseline creatinine approximately 2.6.  Repeat creatinine today 2.6, at baseline.  Follows with Dr. Mills.  Creatinine 3.44 on admission 2/17/2024.  UA: bland.   CK level normal.   CT scan with simple renal cysts.   Of note patient with left wrist AV fistula.  Holding amiloride.  Discontinue IVF today. LE edema present.  5 lb weight gain.   Continue to avoid nephrotoxins, hypotension, IV contrast.   BMP in AM.  Hypokalemia   mag repleted, most recent level 2.3.  Potassium level stable, continue to monitor and replace as needed.  Metabolic acidosis  Normal anion gap secondary to diarrhea.  Resolved, status post sodium bicarbonate drip and oral sodium bicarbonate.  Will continue to monitor and trend.  Essential (primary) hypertension  BP improved, prior episodes of hypotension.  Current medication: Amlodipine 5 mg daily, carvedilol 25 mg twice daily.  Holding amiloride.  Continue to avoid hypotension or high fluctuation in blood pressure.  Diarrhea  Possible viral gastroenteritis including nausea vomiting and diarrhea per primary service.  CT scan with mild left sided colitis.   C diff and enteric stool negative.    PLAN:   -CAM on CKD 4, creatinine is at baseline, suspect due to volume depletion in the setting of nausea, vomiting, diarrhea.  Will D/C fluids today, :E [resent with weight gain.  Holding amiloride.  BMP in AM.  -Hypokalemia, status post magnesium and potassium replacement.  Repeat level within normal range.  -Metabolic acidosis, monitoring off of supplementation.  -Hypertension, prior episodes of hypotension,  blood pressure currently acceptable.  Continue amlodipine and carvedilol.  -Diarrhea, reports ongoing episodes with poor oral intake.  Continue IV fluids today.      Subjective     Denies chest pain or shortness of breath.  Reports feeling nauseous and vomiting x 1 last night.  Reports 2 diarrheal stools already today.  She reports appetite is somewhat improved but still not at baseline.    Objective :  Temp:  [97.4 °F (36.3 °C)-98.2 °F (36.8 °C)] 98.2 °F (36.8 °C)  HR:  [69-72] 69  BP: ()/(49-80) 158/80  Resp:  [20] 20  SpO2:  [89 %-92 %] 92 %  O2 Device: None (Room air)    Current Weight: Weight - Scale: 90.5 kg (199 lb 9.6 oz)  First Weight: Weight - Scale: 88.3 kg (194 lb 9.6 oz)  I/O         02/18 0701  02/19 0700 02/19 0701  02/20 0700 02/20 0701  02/21 0700    P.O. 1030 1200 480    I.V. (mL/kg) 2054.2 (23.3) 0 (0)     IV Piggyback 50      Total Intake(mL/kg) 3134.2 (35.6) 1200 (13.3) 480 (5.3)    Urine (mL/kg/hr) 1100 (0.5)      Stool 0      Total Output 1100      Net +2034.2 +1200 +480           Unmeasured Urine Occurrence 2 x 6 x     Unmeasured Stool Occurrence 14 x 1 x 1 x          Physical Exam  Vitals reviewed.   Constitutional:       Appearance: Normal appearance.   HENT:      Head: Normocephalic.      Nose: Nose normal.      Mouth/Throat:      Mouth: Mucous membranes are moist.   Cardiovascular:      Heart sounds: Normal heart sounds.   Pulmonary:      Breath sounds: Normal breath sounds.   Abdominal:      Palpations: Abdomen is soft.   Musculoskeletal:      Right lower leg: No edema.      Left lower leg: No edema.   Skin:     General: Skin is warm and dry.   Neurological:      General: No focal deficit present.      Mental Status: She is alert. Mental status is at baseline.   Psychiatric:         Mood and Affect: Mood normal.         Medications:    Current Facility-Administered Medications:     acetaminophen (TYLENOL) tablet 650 mg, 650 mg, Oral, Q6H PRN, Zaira Robledo PA-C, 650 mg  at 02/19/25 1827    albuterol (PROVENTIL HFA,VENTOLIN HFA) inhaler 2 puff, 2 puff, Inhalation, Q6H PRN, Zaira Robledo PA-C, 2 puff at 02/19/25 2217    [Held by provider] AMILoride tablet 5 mg, 5 mg, Oral, BID, Zaira Robledo PA-C    amLODIPine (NORVASC) tablet 5 mg, 5 mg, Oral, Daily, Zaira Robledo PA-C, 5 mg at 02/20/25 0807    aspirin chewable tablet 81 mg, 81 mg, Oral, Daily, Zaira Robledo PA-C, 81 mg at 02/20/25 0808    atorvastatin (LIPITOR) tablet 40 mg, 40 mg, Oral, Daily With Dinner, Zaira Robledo PA-C, 40 mg at 02/19/25 1808    bismuth subsalicylate (PEPTO BISMOL) oral suspension 524 mg, 524 mg, Oral, Q6H PRN, Jenaro Cox PA-C, 524 mg at 02/18/25 2348    budesonide-formoterol (SYMBICORT) 160-4.5 mcg/act inhaler 2 puff, 2 puff, Inhalation, BID, Zaira Robledo PA-C, 2 puff at 02/19/25 0831    calcium carbonate (TUMS) chewable tablet 500 mg, 500 mg, Oral, Daily PRN, Jenaro Cox PA-C, 500 mg at 02/18/25 2348    carvedilol (COREG) tablet 25 mg, 25 mg, Oral, BID With Meals, Zaira Robledo PA-C, 25 mg at 02/20/25 0808    Cholecalciferol (VITAMIN D3) tablet 5,000 Units, 5,000 Units, Oral, Daily, Zaira Robledo PA-C, 5,000 Units at 02/20/25 0806    clonazePAM (KlonoPIN) tablet 0.5 mg, 0.5 mg, Oral, BID PRN, Zaira Robledo PA-C    dicyclomine (BENTYL) capsule 10 mg, 10 mg, Oral, 4x Daily (AC & HS), Zaira Robledo PA-C, 10 mg at 02/20/25 0547    ferrous sulfate tablet 325 mg, 325 mg, Oral, Daily With Breakfast, Zaira Robledo PA-C, 325 mg at 02/20/25 0806    fluvoxaMINE (LUVOX) tablet 100 mg, 100 mg, Oral, Daily, MOON Fernandez-C, 100 mg at 02/20/25 0805    fluvoxaMINE (LUVOX) tablet 200 mg, 200 mg, Oral, HS, Zaira Robledo, PA-C, 200 mg at 02/19/25 2104    heparin (porcine) subcutaneous injection 5,000 Units, 5,000 Units, Subcutaneous, Q8H PETE,  "Zaira Robledo PA-C, 5,000 Units at 02/20/25 0548    lactated ringers infusion, 75 mL/hr, Intravenous, Continuous, Cher Rodriguez PA-C, Last Rate: 75 mL/hr at 02/19/25 1043, 75 mL/hr at 02/19/25 1043    lamoTRIgine (LaMICtal) tablet 200 mg, 200 mg, Oral, BID, Zaira Robledo PA-C, 200 mg at 02/20/25 0806    loperamide (IMODIUM) capsule 2 mg, 2 mg, Oral, 4x Daily PRN, Jenaro Cox PA-C, 2 mg at 02/18/25 2348    montelukast (SINGULAIR) tablet 10 mg, 10 mg, Oral, HS, Zaira Robledo PA-C, 10 mg at 02/19/25 2104    ondansetron (ZOFRAN) injection 4 mg, 4 mg, Intravenous, Q4H PRN, Zaira Robledo PA-C    pantoprazole (PROTONIX) EC tablet 40 mg, 40 mg, Oral, Early Morning, Zaira Robledo PA-C, 40 mg at 02/20/25 0547    QUEtiapine (SEROquel) tablet 100 mg, 100 mg, Oral, Daily, Zaira Robledo PA-C, 100 mg at 02/20/25 0808    QUEtiapine (SEROquel) tablet 500 mg, 500 mg, Oral, HS, Zaira Robledo PA-C, 500 mg at 02/19/25 2104      Lab Results: I have reviewed the following results:  Results from last 7 days   Lab Units 02/20/25  0313 02/19/25  0536 02/18/25  0434 02/17/25  1044   WBC Thousand/uL  --  7.44 6.29 6.40   HEMOGLOBIN g/dL  --  11.7 11.3* 12.3   HEMATOCRIT %  --  36.3 34.7* 38.8   PLATELETS Thousands/uL  --  179 172 204   POTASSIUM mmol/L 3.7 3.3* 3.2* 3.5   CHLORIDE mmol/L 109* 110* 114* 111*   CO2 mmol/L 26 26 19* 20*   BUN mg/dL 18 21 24 29*   CREATININE mg/dL 2.63* 2.89* 3.18* 3.44*   CALCIUM mg/dL 8.1* 8.7 9.2 9.5   MAGNESIUM mg/dL  --  2.3 1.8* 2.0   PHOSPHORUS mg/dL  --   --  3.2  --    ALBUMIN g/dL  --   --  3.5 3.9       Administrative Statements     Portions of the record may have been created with voice recognition software. Occasional wrong word or \"sound a like\" substitutions may have occurred due to the inherent limitations of voice recognition software. Read the chart carefully and recognize, using context, where " substitutions have occurred.If you have any questions, please contact the dictating provider.

## 2025-02-20 NOTE — ASSESSMENT & PLAN NOTE
Lab Results   Component Value Date    EGFR 18 02/20/2025    EGFR 16 02/19/2025    EGFR 14 02/18/2025    CREATININE 2.63 (H) 02/20/2025    CREATININE 2.89 (H) 02/19/2025    CREATININE 3.18 (H) 02/18/2025     Presented with GI illness x 1 week, likely viral in nature with associated poor p.o. intake  Creatinine baseline around 2.6  Creatinine on admission 3.44  UA bland  CK WNL  PVR - no retention   Retention protocol + increase hold parameters for antihypertensives  Nephrology following  Hold amiloride  Improving with IVF, continue.  Nephro messaged re: weight gain from yesterday but clinically patient did not appear volume overloaded on exam  Trend BMP

## 2025-02-20 NOTE — ASSESSMENT & PLAN NOTE
mag repleted, most recent level 2.3.  Potassium level stable, continue to monitor and replace as needed.

## 2025-02-21 VITALS
WEIGHT: 201.2 LBS | HEART RATE: 77 BPM | DIASTOLIC BLOOD PRESSURE: 59 MMHG | TEMPERATURE: 98.2 F | SYSTOLIC BLOOD PRESSURE: 125 MMHG | OXYGEN SATURATION: 91 % | BODY MASS INDEX: 31.51 KG/M2 | RESPIRATION RATE: 20 BRPM

## 2025-02-21 PROBLEM — E87.6 HYPOKALEMIA: Status: RESOLVED | Noted: 2024-02-08 | Resolved: 2025-02-21

## 2025-02-21 LAB
ANION GAP SERPL CALCULATED.3IONS-SCNC: 3 MMOL/L (ref 4–13)
ATRIAL RATE: 72 BPM
BUN SERPL-MCNC: 16 MG/DL (ref 5–25)
CALCIUM SERPL-MCNC: 7.8 MG/DL (ref 8.4–10.2)
CHLORIDE SERPL-SCNC: 112 MMOL/L (ref 96–108)
CO2 SERPL-SCNC: 27 MMOL/L (ref 21–32)
CREAT SERPL-MCNC: 2.48 MG/DL (ref 0.6–1.3)
GFR SERPL CREATININE-BSD FRML MDRD: 20 ML/MIN/1.73SQ M
GLUCOSE SERPL-MCNC: 97 MG/DL (ref 65–140)
MAGNESIUM SERPL-MCNC: 1.9 MG/DL (ref 1.9–2.7)
P AXIS: 57 DEGREES
POTASSIUM SERPL-SCNC: 3.8 MMOL/L (ref 3.5–5.3)
PR INTERVAL: 152 MS
QRS AXIS: 133 DEGREES
QRSD INTERVAL: 156 MS
QT INTERVAL: 454 MS
QTC INTERVAL: 497 MS
SODIUM SERPL-SCNC: 142 MMOL/L (ref 135–147)
T WAVE AXIS: 37 DEGREES
VENTRICULAR RATE: 72 BPM

## 2025-02-21 PROCEDURE — 83735 ASSAY OF MAGNESIUM: CPT | Performed by: INTERNAL MEDICINE

## 2025-02-21 PROCEDURE — 93010 ELECTROCARDIOGRAM REPORT: CPT | Performed by: INTERNAL MEDICINE

## 2025-02-21 PROCEDURE — 99239 HOSP IP/OBS DSCHRG MGMT >30: CPT

## 2025-02-21 PROCEDURE — 80048 BASIC METABOLIC PNL TOTAL CA: CPT | Performed by: INTERNAL MEDICINE

## 2025-02-21 PROCEDURE — 99232 SBSQ HOSP IP/OBS MODERATE 35: CPT | Performed by: INTERNAL MEDICINE

## 2025-02-21 RX ORDER — DICYCLOMINE HYDROCHLORIDE 10 MG/1
10 CAPSULE ORAL 4 TIMES DAILY PRN
Qty: 30 CAPSULE | Refills: 0 | Status: SHIPPED | OUTPATIENT
Start: 2025-02-21

## 2025-02-21 RX ORDER — LANOLIN ALCOHOL/MO/W.PET/CERES
800 CREAM (GRAM) TOPICAL 2 TIMES DAILY
Status: DISCONTINUED | OUTPATIENT
Start: 2025-02-21 | End: 2025-02-21 | Stop reason: HOSPADM

## 2025-02-21 RX ORDER — AMILORIDE HYDROCHLORIDE 5 MG/1
5 TABLET ORAL DAILY
Qty: 90 TABLET | Refills: 0 | Status: SHIPPED | OUTPATIENT
Start: 2025-02-21

## 2025-02-21 RX ADMIN — ONDANSETRON 4 MG: 2 INJECTION INTRAMUSCULAR; INTRAVENOUS at 10:22

## 2025-02-21 RX ADMIN — FERROUS SULFATE TAB 325 MG (65 MG ELEMENTAL FE) 325 MG: 325 (65 FE) TAB at 10:14

## 2025-02-21 RX ADMIN — Medication 5000 UNITS: at 10:12

## 2025-02-21 RX ADMIN — AMLODIPINE BESYLATE 5 MG: 5 TABLET ORAL at 10:15

## 2025-02-21 RX ADMIN — CARVEDILOL 25 MG: 25 TABLET, FILM COATED ORAL at 10:14

## 2025-02-21 RX ADMIN — Medication 800 MG: at 10:14

## 2025-02-21 RX ADMIN — DICYCLOMINE HYDROCHLORIDE 10 MG: 10 CAPSULE ORAL at 05:23

## 2025-02-21 RX ADMIN — LAMOTRIGINE 200 MG: 100 TABLET ORAL at 10:16

## 2025-02-21 RX ADMIN — FLUVOXAMINE MALEATE 100 MG: 50 TABLET ORAL at 10:15

## 2025-02-21 RX ADMIN — HEPARIN SODIUM 5000 UNITS: 5000 INJECTION INTRAVENOUS; SUBCUTANEOUS at 13:17

## 2025-02-21 RX ADMIN — ASPIRIN 81 MG CHEWABLE TABLET 81 MG: 81 TABLET CHEWABLE at 10:13

## 2025-02-21 RX ADMIN — HEPARIN SODIUM 5000 UNITS: 5000 INJECTION INTRAVENOUS; SUBCUTANEOUS at 05:24

## 2025-02-21 RX ADMIN — QUETIAPINE FUMARATE 100 MG: 100 TABLET ORAL at 10:15

## 2025-02-21 RX ADMIN — PANTOPRAZOLE SODIUM 40 MG: 40 TABLET, DELAYED RELEASE ORAL at 05:23

## 2025-02-21 RX ADMIN — BUDESONIDE AND FORMOTEROL FUMARATE DIHYDRATE 2 PUFF: 160; 4.5 AEROSOL RESPIRATORY (INHALATION) at 10:16

## 2025-02-21 RX ADMIN — AMILORIDE HYDROCLORIDE 5 MG: 5 TABLET ORAL at 10:14

## 2025-02-21 RX ADMIN — ACETAMINOPHEN 650 MG: 325 TABLET, FILM COATED ORAL at 05:23

## 2025-02-21 RX ADMIN — DICYCLOMINE HYDROCHLORIDE 10 MG: 10 CAPSULE ORAL at 10:30

## 2025-02-21 NOTE — PLAN OF CARE
Problem: Potential for Falls  Goal: Patient will remain free of falls  Description: INTERVENTIONS:  - Educate patient/family on patient safety including physical limitations  - Instruct patient to call for assistance with activity   - Consult OT/PT to assist with strengthening/mobility   - Keep Call bell within reach  - Keep bed low and locked with side rails adjusted as appropriate  - Keep care items and personal belongings within reach  - Initiate and maintain comfort rounds  - Make Fall Risk Sign visible to staff  - Offer Toileting every x Hours, in advance of need  - Initiate/Maintain xalarm  - Obtain necessary fall risk management equipment: x  - Apply yellow socks and bracelet for high fall risk patients  - Consider moving patient to room near nurses station  2/21/2025 1518 by Rebeca Bartlett, RN  Outcome: Adequate for Discharge  2/21/2025 0959 by Rebeca Bartlett RN  Outcome: Progressing     Problem: PAIN - ADULT  Goal: Verbalizes/displays adequate comfort level or baseline comfort level  Description: Interventions:  - Encourage patient to monitor pain and request assistance  - Assess pain using appropriate pain scale  - Administer analgesics based on type and severity of pain and evaluate response  - Implement non-pharmacological measures as appropriate and evaluate response  - Consider cultural and social influences on pain and pain management  - Notify physician/advanced practitioner if interventions unsuccessful or patient reports new pain  2/21/2025 1518 by Rebeca Bartlett, RN  Outcome: Adequate for Discharge  2/21/2025 0959 by Rebeca Bartlett, RN  Outcome: Progressing     Problem: INFECTION - ADULT  Goal: Absence or prevention of progression during hospitalization  Description: INTERVENTIONS:  - Assess and monitor for signs and symptoms of infection  - Monitor lab/diagnostic results  - Monitor all insertion sites, i.e. indwelling lines, tubes, and drains  - Monitor endotracheal if appropriate and  nasal secretions for changes in amount and color  - [place] appropriate cooling/warming therapies per order  - Administer medications as ordered  - Instruct and encourage patient and family to use good hand hygiene technique  - Identify and instruct in appropriate isolation precautions for identified infection/condition  2/21/2025 1518 by Rebeca Bartlett RN  Outcome: Adequate for Discharge  2/21/2025 0959 by Rebeca Bartlett RN  Outcome: Progressing  Goal: Absence of fever/infection during neutropenic period  Description: INTERVENTIONS:  - Monitor WBC    2/21/2025 1518 by Rebeca Bartlett RN  Outcome: Adequate for Discharge  2/21/2025 0959 by Rebeca Bartlett RN  Outcome: Progressing     Problem: SAFETY ADULT  Goal: Patient will remain free of falls  Description: INTERVENTIONS:  - Educate patient/family on patient safety including physical limitations  - Instruct patient to call for assistance with activity   - Consult OT/PT to assist with strengthening/mobility   - Keep Call bell within reach  - Keep bed low and locked with side rails adjusted as appropriate  - Keep care items and personal belongings within reach  - Initiate and maintain comfort rounds  - Make Fall Risk Sign visible to staff  - Offer Toileting every x Hours, in advance of need  - Initiate/Maintain xalarm  - Obtain necessary fall risk management equipment: x  - Apply yellow socks and bracelet for high fall risk patients  - Consider moving patient to room near nurses station  2/21/2025 1518 by Rebeca Bartlett RN  Outcome: Adequate for Discharge  2/21/2025 0959 by Rebeca Bartlett RN  Outcome: Progressing  Goal: Maintain or return to baseline ADL function  Description: INTERVENTIONS:  -  Assess patient's ability to carry out ADLs; assess patient's baseline for ADL function and identify physical deficits which impact ability to perform ADLs (bathing, care of mouth/teeth, toileting, grooming, dressing, etc.)  - Assess/evaluate cause of self-care  deficits   - Assess range of motion  - Assess patient's mobility; develop plan if impaired  - Assess patient's need for assistive devices and provide as appropriate  - Encourage maximum independence but intervene and supervise when necessary  - Involve family in performance of ADLs  - Assess for home care needs following discharge   - Consider OT consult to assist with ADL evaluation and planning for discharge  - Provide patient education as appropriate  2/21/2025 1518 by Rebeca Bartlett RN  Outcome: Adequate for Discharge  2/21/2025 0959 by Rebeca Bartlett RN  Outcome: Progressing  Goal: Maintains/Returns to pre admission functional level  Description: INTERVENTIONS:  - Perform AM-PAC 6 Click Basic Mobility/ Daily Activity assessment daily.  - Set and communicate daily mobility goal to care team and patient/family/caregiver.   - Collaborate with rehabilitation services on mobility goals if consulted  - Perform Range of Motion x times a day.  - Reposition patient every x hours.  - Dangle patient x times a day  - Stand patient x times a day  - Ambulate patient x times a day  - Out of bed to chair xxx times a day   - Out of bed for meals x times a day  - Out of bed for toileting  - Record patient progress and toleration of activity level   2/21/2025 1518 by Rebeca Bartlett RN  Outcome: Adequate for Discharge  2/21/2025 0959 by Rebeca Bartlett RN  Outcome: Progressing     Problem: DISCHARGE PLANNING  Goal: Discharge to home or other facility with appropriate resources  Description: INTERVENTIONS:  - Identify barriers to discharge w/patient and caregiver  - Arrange for needed discharge resources and transportation as appropriate  - Identify discharge learning needs (meds, wound care, etc.)  - Arrange for interpretive services to assist at discharge as needed  - Refer to Case Management Department for coordinating discharge planning if the patient needs post-hospital services based on physician/advanced practitioner  order or complex needs related to functional status, cognitive ability, or social support system  2/21/2025 1518 by Rebeca Bartlett, RN  Outcome: Adequate for Discharge  2/21/2025 0959 by Rebeca Bartlett RN  Outcome: Progressing     Problem: Knowledge Deficit  Goal: Patient/family/caregiver demonstrates understanding of disease process, treatment plan, medications, and discharge instructions  Description: Complete learning assessment and assess knowledge base.  Interventions:  - Provide teaching at level of understanding  - Provide teaching via preferred learning methods  2/21/2025 1518 by Rebeca Bartlett, RN  Outcome: Adequate for Discharge  2/21/2025 0959 by Rebeca Bartlett, RN  Outcome: Progressing

## 2025-02-21 NOTE — PLAN OF CARE
Problem: Potential for Falls  Goal: Patient will remain free of falls  Description: INTERVENTIONS:  - Educate patient/family on patient safety including physical limitations  - Instruct patient to call for assistance with activity   - Consult OT/PT to assist with strengthening/mobility   - Keep Call bell within reach  - Keep bed low and locked with side rails adjusted as appropriate  - Keep care items and personal belongings within reach  - Initiate and maintain comfort rounds  - Make Fall Risk Sign visible to staff  - Offer Toileting every 2 Hours, in advance of need  - Initiate/Maintain alarm  - Obtain necessary fall risk management equipment:  - Apply yellow socks and bracelet for high fall risk patients  - Consider moving patient to room near nurses station  Outcome: Progressing     Problem: PAIN - ADULT  Goal: Verbalizes/displays adequate comfort level or baseline comfort level  Description: Interventions:  - Encourage patient to monitor pain and request assistance  - Assess pain using appropriate pain scale  - Administer analgesics based on type and severity of pain and evaluate response  - Implement non-pharmacological measures as appropriate and evaluate response  - Consider cultural and social influences on pain and pain management  - Notify physician/advanced practitioner if interventions unsuccessful or patient reports new pain  Outcome: Progressing     Problem: SAFETY ADULT  Goal: Patient will remain free of falls  Description: INTERVENTIONS:  - Educate patient/family on patient safety including physical limitations  - Instruct patient to call for assistance with activity   - Consult OT/PT to assist with strengthening/mobility   - Keep Call bell within reach  - Keep bed low and locked with side rails adjusted as appropriate  - Keep care items and personal belongings within reach  - Initiate and maintain comfort rounds  - Make Fall Risk Sign visible to staff  - Offer Toileting every 2 Hours, in advance  of need  - Initiate/Maintain alarm  - Obtain necessary fall risk management equipment  - Apply yellow socks and bracelet for high fall risk patients  - Consider moving patient to room near nurses station  Outcome: Progressing

## 2025-02-21 NOTE — ASSESSMENT & PLAN NOTE
Etiology: Prerenal in the setting of GI losses inability to take oral fluids possible mild effect of amiloride.  Baseline creatinine approximately 2.6.  Repeat creatinine today 2.4, at baseline.  Follows with Dr. Mills.  Creatinine 3.44 on admission 2/17/2024.  UA: bland.   CK level normal.   CT scan with simple renal cysts.   Of note patient with left wrist AV fistula. No evidence of emergent need for dialysis.   Amiloride resumed 2/20.  Monitoring off of IVF.  Continue to avoid nephrotoxins, hypotension, IV contrast.   Message sent to office to arrange hospital follow-up.  Will sign off from renal at this time.

## 2025-02-21 NOTE — DISCHARGE SUMMARY
"Discharge Summary - Hospitalist   Name: Ayleen Moralez 64 y.o. female I MRN: [id number]  Unit/Bed#: -01 I Date of Admission: 2/17/2025   Date of Service: 2/21/2025 I Hospital Day: 4     Assessment & Plan  Acute kidney injury superimposed on stage 4 chronic kidney disease (HCC)  Lab Results   Component Value Date    EGFR 20 02/21/2025    EGFR 18 02/20/2025    EGFR 16 02/19/2025    CREATININE 2.48 (H) 02/21/2025    CREATININE 2.63 (H) 02/20/2025    CREATININE 2.89 (H) 02/19/2025     Patient presented with GI illness for 1 week, which is attributed to likely viral in nature.  Also, patient with noted poor PO intake.   Per chart review, creatinine baseline around 2.6.    Now, creatinine stabilized and at 2.48 today.    UA - negative,   Continue urinary retention protocol, while inpatient.  PVR checked and no retention.  Appreciate nephrology consultation.  Per nephrology can resume amiloride 5 mg, twice daily.  Patient is s/p IV fluid hydration.  Patient is cleared from nephrology standpoint with outpatient labs 1 week.  Would strongly recommend outpatient BMP in 1 week.  Diarrhea  Patient was seen by PCP on 2/14 for general malaise, arthralgias, nausea/vomiting, diarrhea for one week.   Patient was not meeting SIRS/sepsis criteria. It is likely that patient with viral etiology of diarrhea.   CT a/p (2/17): \"Mild left-sided colitis.\"  Per lab review, stool studies are negative.  Can continue PRN imodium.   Continue to encourage adequate nutrition/hydration.  Essential (primary) hypertension  Home Regimen: Amlodipine 5 mg, daily, Coreg 25 mg, twice daily, amiloride 5 mg, twice daily.   Can resume home regimen on discharge.  Moderate persistent asthma, uncomplicated  Patient does not appear to be in acute exacerbation.    Continue home Singulair and Symbicort.   Anxiety disorder, unspecified  Continue home regimen of clonazepam, sulfoximine, lamotrigine, and quetiapine.    PDMP reviewed, clonazepam 0.5 mg, " "270 tabs (filled 1/7 x 90 days)  Per chart review, lamotrigine level noted at 9.4.  Hypokalemia (Resolved: 2/21/2025)  Per chart review, potassium level stable at 3.8 mmol/L.     Medical Problems       Resolved Problems  Date Reviewed: 2/20/2025          Resolved    Hypokalemia 2/21/2025     Resolved by  EMERITA Beebe    Metabolic acidosis 2/20/2025     Resolved by  Amy Perez PA-C        Discharging Physician / Practitioner: EMERITA Beebe  PCP: Bette Harp DO  Admission Date:   Admission Orders (From admission, onward)       Ordered        02/17/25 1346  INPATIENT ADMISSION  Once                          Discharge Date: 02/21/25    Consultations During Hospital Stay:  Nephrology     Procedures Performed:   None     Significant Findings / Test Results:   XR chest 1 view portable (2/17): \"No acute cardiopulmonary disease.\"  CT abdomen pelvis wo contrast (2/17): \"Mild left-sided colitis.\"    Incidental Findings:   None      Test Results Pending at Discharge (will require follow up):   None      Outpatient Tests Requested:  I would recommend outpatient CBC/BMP in 1 week.  I would recommend outpatient follow-up with PCP in 1 week.    Complications:  None     Reason for Admission: CAM     Hospital Course:   Ayleen Moralez is a 64 y.o. female patient who originally presented to the hospital on 2/17/2025 due to symptoms of nausea/vomiting for 1 week.  Patient expressed that she had minimal oral intake and having multiple episodes per day of diarrhea.  Patient expressed that she has intermittent vomiting episodes as well.  On admission, patient was noted to have CAM with creatinine of 3.44.  UA showing small leuks and CK negative.  CT a/p was notable for mild left-sided colitis.  Stool studies were collected and negative.  Patient was given IV fluids with resolution of CAM.  Per patient, diarrhea is improving and feeling some more solid stool.  Also, patient able to tolerate more items orally, " noting able to keep liquids down and some food items. Per nephrology, cleared from their standpoint.  Patient is feeling better.  CAM resolved.  GI symptoms continue to improve.    Please see above list of diagnoses and related plan for additional information.     Condition at Discharge: stable    Discharge Day Visit / Exam:   Subjective:  The patient was seen and examined at the bedside this morning. The patient expressed that she is feeling okay today, but not fully back to her normal. The patient expresses that she still does not have the greatest appetite, but able to tolerate some food items. The patient expresses that she is able to tolerate more liquids than solids.     Vitals: Blood Pressure: 125/59 (02/21/25 1024)  Pulse: 77 (02/21/25 1024)  Temperature: 98.2 °F (36.8 °C) (02/19/25 1418)  Temp Source: Oral (02/19/25 1418)  Respirations: 20 (02/20/25 1607)  Weight - Scale: 91.3 kg (201 lb 3.2 oz) (02/21/25 1002)  SpO2: 91 % (02/21/25 1024)    Physical Exam  Vitals and nursing note reviewed.   Constitutional:       General: She is awake. She is not in acute distress.     Appearance: She is not ill-appearing.   Eyes:      General: No scleral icterus.     Conjunctiva/sclera: Conjunctivae normal.      Comments: On exam, patient wears glasses.    Cardiovascular:      Rate and Rhythm: Normal rate and regular rhythm.      Heart sounds: S1 normal and S2 normal. Murmur heard.      Systolic murmur is present with a grade of 2/6.   Pulmonary:      Effort: Pulmonary effort is normal. No respiratory distress.      Breath sounds: Normal breath sounds. No decreased air movement. No wheezing, rhonchi or rales.   Abdominal:      General: Bowel sounds are normal.      Palpations: Abdomen is soft.      Tenderness: There is no abdominal tenderness.   Musculoskeletal:      Right lower leg: Edema (trace non-pitting) present.      Left lower leg: Edema (trace non-pitting) present.   Skin:     General: Skin is warm and dry.       Comments: On exam, no evidence of wounds/lesions on exposed skin.    Neurological:      General: No focal deficit present.      Mental Status: She is alert and oriented to person, place, and time.      Motor: Motor function is intact.   Psychiatric:         Attention and Perception: Attention normal.         Mood and Affect: Mood normal.         Speech: Speech normal.         Discussion with Family: Patient declined call to .     Discharge instructions/Information to patient and family:   See after visit summary for information provided to patient and family.      Provisions for Follow-Up Care:  See after visit summary for information related to follow-up care and any pertinent home health orders.      Mobility at time of Discharge:   Basic Mobility Inpatient Raw Score: 24  JH-HLM Goal: 8: Walk 250 feet or more  JH-HLM Achieved: 8: Walk 250 feet ot more  HLM Goal achieved. Continue to encourage appropriate mobility.     Disposition:   Home    Planned Readmission: No, no planned readmission.     Discharge Medications:  See after visit summary for reconciled discharge medications provided to patient and/or family.      Administrative Statements   Discharge Statement:  I have spent a total time of 65 minutes in caring for this patient on the day of the visit/encounter. .    **Please Note: This note may have been constructed using a voice recognition system**

## 2025-02-21 NOTE — PROGRESS NOTES
NEPHROLOGY HOSPITAL PROGRESS NOTE   Ayleen Moralez 64 y.o. female MRN: [id number]  Unit/Bed#: -01 Encounter: [id number]  Reason for Consult: CAM on CKD IV    Assessment & Plan  Acute kidney injury superimposed on stage 4 chronic kidney disease (HCC)  Etiology: Prerenal in the setting of GI losses inability to take oral fluids possible mild effect of amiloride.  Baseline creatinine approximately 2.6.  Repeat creatinine today 2.4, at baseline.  Follows with Dr. Mills.  Creatinine 3.44 on admission 2/17/2024.  UA: bland.   CK level normal.   CT scan with simple renal cysts.   Of note patient with left wrist AV fistula. No evidence of emergent need for dialysis.   Amiloride resumed 2/20.  Monitoring off of IVF.  Continue to avoid nephrotoxins, hypotension, IV contrast.   Message sent to office to arrange hospital follow-up.  Will sign off from renal at this time.   Hypokalemia  Stable. Mg level also stable.   Continue to monitor and replace as needed.  Amiloride resumed.  Essential (primary) hypertension  BP at goal, prior episodes of hypotension.  Current medication: Amlodipine 5 mg daily, carvedilol 25 mg twice daily, amiloride 5 mg daily.  Continue to avoid hypotension or high fluctuation in blood pressure.  Diarrhea  Possible viral gastroenteritis including nausea vomiting and diarrhea per primary service.  CT scan with mild left sided colitis.   C diff and enteric stool negative.      Plan:  -CAM, creatinine remains at baseline, currently resolved.  Amiloride resumed.  Monitoring off of IV fluids.  Stable for discharge from nephrology standpoint.  Message sent to office to arrange hospital follow-up with repeat BMP in 1 week. Will sign off at this time.   -Hypokalemia, mag level previously low, status post replacement.  Both levels remain stable.  -Hypertension, prior episodes of hypotension, blood pressure currently at goal.  Continue antihypertensive regimen as per above.  -Diarrhea, received supportive  IV fluids previously.  Stool studies negative.  CT scan with mild left-sided colitis.  Management per primary care team.    SUBJECTIVE / 24H INTERVAL HISTORY:  Denies chest pain or shortness of breath.  Denies any further episodes of diarrhea.    OBJECTIVE:  Current Weight: Weight - Scale: 91.3 kg (201 lb 3.2 oz)  Vitals:    02/21/25 1002 02/21/25 1015 02/21/25 1017 02/21/25 1024   BP:  125/59 125/59 125/59   BP Location:       Pulse:   82 77   Resp:       Temp:       TempSrc:       SpO2:   97% 91%   Weight: 91.3 kg (201 lb 3.2 oz)          Intake/Output Summary (Last 24 hours) at 2/21/2025 1107  Last data filed at 2/21/2025 1014  Gross per 24 hour   Intake 1100 ml   Output --   Net 1100 ml     Physical Exam  Vitals reviewed.   Constitutional:       Appearance: Normal appearance.   HENT:      Head: Atraumatic.   Cardiovascular:      Heart sounds: Normal heart sounds.   Pulmonary:      Breath sounds: Normal breath sounds.   Abdominal:      Palpations: Abdomen is soft.   Musculoskeletal:      Right lower leg: Edema present.      Left lower leg: Edema present.   Skin:     General: Skin is warm and dry.   Neurological:      General: No focal deficit present.      Mental Status: She is alert. Mental status is at baseline.   Psychiatric:         Mood and Affect: Mood normal.        Medications:    Current Facility-Administered Medications:     acetaminophen (TYLENOL) tablet 650 mg, 650 mg, Oral, Q6H PRN, Zaira Robledo PA-C, 650 mg at 02/21/25 0523    albuterol (PROVENTIL HFA,VENTOLIN HFA) inhaler 2 puff, 2 puff, Inhalation, Q6H PRN, Zaira Robledo PA-C, 2 puff at 02/19/25 2217    AMILoride tablet 5 mg, 5 mg, Oral, Daily, Robinson Samuel MD, 5 mg at 02/21/25 1014    amLODIPine (NORVASC) tablet 5 mg, 5 mg, Oral, Daily, Zaira Robledo PA-C, 5 mg at 02/21/25 1015    aspirin chewable tablet 81 mg, 81 mg, Oral, Daily, Zaira Robledo PA-C, 81 mg at 02/21/25 1013    atorvastatin  (LIPITOR) tablet 40 mg, 40 mg, Oral, Daily With Dinner, Zaira Robledo PA-C, 40 mg at 02/20/25 1733    bismuth subsalicylate (PEPTO BISMOL) oral suspension 524 mg, 524 mg, Oral, Q6H PRN, Jenaro Cox PA-C, 524 mg at 02/18/25 2348    budesonide-formoterol (SYMBICORT) 160-4.5 mcg/act inhaler 2 puff, 2 puff, Inhalation, BID, Zaira Robledo PA-C, 2 puff at 02/21/25 1016    calcium carbonate (TUMS) chewable tablet 500 mg, 500 mg, Oral, Daily PRN, Jenaro Cox PA-C, 500 mg at 02/18/25 2348    carvedilol (COREG) tablet 25 mg, 25 mg, Oral, BID With Meals, Robinson Samuel MD, 25 mg at 02/21/25 1014    Cholecalciferol (VITAMIN D3) tablet 5,000 Units, 5,000 Units, Oral, Daily, Zaira Robledo PA-C, 5,000 Units at 02/21/25 1012    clonazePAM (KlonoPIN) tablet 0.5 mg, 0.5 mg, Oral, BID PRN, Zaira Robledo PA-C    dicyclomine (BENTYL) capsule 10 mg, 10 mg, Oral, 4x Daily (AC & HS), Zaira Robledo PA-C, 10 mg at 02/21/25 1030    ferrous sulfate tablet 325 mg, 325 mg, Oral, Daily With Breakfast, Zaira Robledo PA-C, 325 mg at 02/21/25 1014    fluvoxaMINE (LUVOX) tablet 100 mg, 100 mg, Oral, Daily, Zaira Robledo PA-C, 100 mg at 02/21/25 1015    fluvoxaMINE (LUVOX) tablet 200 mg, 200 mg, Oral, HS, Zaira Robledo PA-C, 200 mg at 02/20/25 2137    heparin (porcine) subcutaneous injection 5,000 Units, 5,000 Units, Subcutaneous, Q8H PETE, Zaira Robledo PA-C, 5,000 Units at 02/21/25 0524    lamoTRIgine (LaMICtal) tablet 200 mg, 200 mg, Oral, BID, Zaira Robledo PA-C, 200 mg at 02/21/25 1016    loperamide (IMODIUM) capsule 2 mg, 2 mg, Oral, 4x Daily PRN, Jenaro Cox PA-C, 2 mg at 02/18/25 2348    magnesium Oxide (MAG-OX) tablet 800 mg, 800 mg, Oral, BID, Zaira Robledo PA-C, 800 mg at 02/21/25 1014    montelukast (SINGULAIR) tablet 10 mg, 10 mg, Oral, HS, Zaira Robledo PA-C, 10 mg at  "02/20/25 2138    ondansetron (ZOFRAN) injection 4 mg, 4 mg, Intravenous, Q4H PRN, Zaira Robledo PA-C, 4 mg at 02/21/25 1022    pantoprazole (PROTONIX) EC tablet 40 mg, 40 mg, Oral, Early Morning, Zaira Robledo PA-C, 40 mg at 02/21/25 0523    QUEtiapine (SEROquel) tablet 100 mg, 100 mg, Oral, Daily, Zaira Robledo PA-C, 100 mg at 02/21/25 1015    QUEtiapine (SEROquel) tablet 500 mg, 500 mg, Oral, HS, Zaira Robledo PA-C, 500 mg at 02/20/25 2137    Laboratory Results:  Results from last 7 days   Lab Units 02/21/25  0520 02/20/25  0313 02/19/25  0536 02/18/25  0434 02/17/25  1044   WBC Thousand/uL  --   --  7.44 6.29 6.40   HEMOGLOBIN g/dL  --   --  11.7 11.3* 12.3   HEMATOCRIT %  --   --  36.3 34.7* 38.8   PLATELETS Thousands/uL  --   --  179 172 204   POTASSIUM mmol/L 3.8 3.7 3.3* 3.2* 3.5   CHLORIDE mmol/L 112* 109* 110* 114* 111*   CO2 mmol/L 27 26 26 19* 20*   BUN mg/dL 16 18 21 24 29*   CREATININE mg/dL 2.48* 2.63* 2.89* 3.18* 3.44*   CALCIUM mg/dL 7.8* 8.1* 8.7 9.2 9.5   MAGNESIUM mg/dL 1.9  --  2.3 1.8* 2.0   PHOSPHORUS mg/dL  --   --   --  3.2  --        Portions of the record may have been created with voice recognition software. Occasional wrong word or \"sound a like\" substitutions may have occurred due to the inherent limitations of voice recognition software. Read the chart carefully and recognize, using context, where substitutions have occurred.If you have any questions, please contact the dictating provider.    "

## 2025-02-21 NOTE — ASSESSMENT & PLAN NOTE
Continue home regimen of clonazepam, sulfoximine, lamotrigine, and quetiapine.    PDMP reviewed, clonazepam 0.5 mg, 270 tabs (filled 1/7 x 90 days)  Per chart review, lamotrigine level noted at 9.4.

## 2025-02-21 NOTE — PLAN OF CARE

## 2025-02-21 NOTE — ASSESSMENT & PLAN NOTE
"Patient was seen by PCP on 2/14 for general malaise, arthralgias, nausea/vomiting, diarrhea for one week.   Patient was not meeting SIRS/sepsis criteria. It is likely that patient with viral etiology of diarrhea.   CT a/p (2/17): \"Mild left-sided colitis.\"  Per lab review, stool studies are negative.  Can continue PRN imodium.   Continue to encourage adequate nutrition/hydration.  "

## 2025-02-21 NOTE — ASSESSMENT & PLAN NOTE
Home Regimen: Amlodipine 5 mg, daily, Coreg 25 mg, twice daily, amiloride 5 mg, twice daily.   Can resume home regimen on discharge.

## 2025-02-21 NOTE — ASSESSMENT & PLAN NOTE
BP at goal, prior episodes of hypotension.  Current medication: Amlodipine 5 mg daily, carvedilol 25 mg twice daily, amiloride 5 mg daily.  Continue to avoid hypotension or high fluctuation in blood pressure.

## 2025-02-21 NOTE — DISCHARGE INSTR - AVS FIRST PAGE
Follow-Up/Providers:  Please follow-up with nephrology in 1 week postdischarge.  Follow-up with PCP 1 week postdischarge.    Medication Changes:  Please change amiloride from 5 mg twice daily to 5 mg once daily until further instructed by outpatient nephrology.  Please continue all other home medications, as prescribed.     Lifestyle Modifications:   Please continue cardiac, low-sodium diet.

## 2025-02-21 NOTE — ASSESSMENT & PLAN NOTE
Lab Results   Component Value Date    EGFR 20 02/21/2025    EGFR 18 02/20/2025    EGFR 16 02/19/2025    CREATININE 2.48 (H) 02/21/2025    CREATININE 2.63 (H) 02/20/2025    CREATININE 2.89 (H) 02/19/2025     Patient presented with GI illness for 1 week, which is attributed to likely viral in nature.  Also, patient with noted poor PO intake.   Per chart review, creatinine baseline around 2.6.    Now, creatinine stabilized and at 2.48 today.    UA - negative,   Continue urinary retention protocol, while inpatient.  PVR checked and no retention.  Appreciate nephrology consultation.  Per nephrology can resume amiloride 5 mg, twice daily.  Patient is s/p IV fluid hydration.  Patient is cleared from nephrology standpoint with outpatient labs 1 week.  Would strongly recommend outpatient BMP in 1 week.

## 2025-02-24 ENCOUNTER — TELEPHONE (OUTPATIENT)
Age: 65
End: 2025-02-24

## 2025-02-24 ENCOUNTER — PATIENT OUTREACH (OUTPATIENT)
Dept: CASE MANAGEMENT | Facility: OTHER | Age: 65
End: 2025-02-24

## 2025-02-24 ENCOUNTER — TRANSITIONAL CARE MANAGEMENT (OUTPATIENT)
Dept: FAMILY MEDICINE CLINIC | Facility: HOSPITAL | Age: 65
End: 2025-02-24

## 2025-02-24 NOTE — TELEPHONE ENCOUNTER
Called to schedule a hospital follow up in [place] with Dr. Mills. Voicemail was full, unable to leave a message.

## 2025-02-24 NOTE — TELEPHONE ENCOUNTER
Pt called in to schedule an appt with Dr. Mills or an AP pt was recently in the hospital and was told to follow up. There are no appts available at this time in the QO. Please call pt and inform if there are any other availabilities.

## 2025-02-24 NOTE — PROGRESS NOTES
Outpatient Care Management Note:    New HRR referral received. Patient was hospitalized from 2/17-2/21/25 with CAM.  She presented following a GI illness X 1 week with decreased oral intake, diarrhea and vomiting. She was given IV fluids with resolution of CAM. Diarrhea was improving and she was tolerating liquids and some foods. She is to follow up with her PCP and nephrology. She will need a BMP in 1 week.     Voice mail message left for Ayleen, with my contact information, introducing myself and requesting a call back.

## 2025-02-26 ENCOUNTER — PATIENT OUTREACH (OUTPATIENT)
Dept: CASE MANAGEMENT | Facility: OTHER | Age: 65
End: 2025-02-26

## 2025-02-26 NOTE — PROGRESS NOTES
Outpatient Care Management Note:    Care manager called Ayleen and introduced myself. She states that she did have an episode of diarrhea this morning. She states she is eating and drinking. She is scheduled to see Dr Harp on 3/5 but will call her sooner if she has ongoing diarrhea.  She denies any further vomiting. She is planning on getting her BMP blood work completed tomorrow.     CM reviewed that she needs a follow up appt with nephrology. Ayleen states she did call and is waiting on a call back. CM encouraged her to call again if she does not hear from them in the next day.     CM also reviewed that she missed her orthopedics and podiatry appts while hospitalized. She will call to reschedule.     Ayleen declined completing a med review and declined ongoing outreach. She will contact Dr Harp with any questions.

## 2025-02-27 ENCOUNTER — OFFICE VISIT (OUTPATIENT)
Dept: CARDIOLOGY CLINIC | Facility: CLINIC | Age: 65
End: 2025-02-27
Payer: MEDICARE

## 2025-02-27 ENCOUNTER — HOSPITAL ENCOUNTER (OUTPATIENT)
Dept: NUCLEAR MEDICINE | Facility: HOSPITAL | Age: 65
End: 2025-02-27
Attending: INTERNAL MEDICINE
Payer: MEDICARE

## 2025-02-27 ENCOUNTER — HOSPITAL ENCOUNTER (OUTPATIENT)
Dept: NON INVASIVE DIAGNOSTICS | Facility: HOSPITAL | Age: 65
Discharge: HOME | End: 2025-02-27
Attending: INTERNAL MEDICINE
Payer: MEDICARE

## 2025-02-27 ENCOUNTER — APPOINTMENT (OUTPATIENT)
Dept: LAB | Facility: HOSPITAL | Age: 65
End: 2025-02-27
Payer: MEDICARE

## 2025-02-27 VITALS
HEIGHT: 67 IN | SYSTOLIC BLOOD PRESSURE: 124 MMHG | DIASTOLIC BLOOD PRESSURE: 68 MMHG | WEIGHT: 209 LBS | BODY MASS INDEX: 32.8 KG/M2 | HEART RATE: 75 BPM

## 2025-02-27 DIAGNOSIS — I35.0 MODERATE AORTIC STENOSIS: ICD-10-CM

## 2025-02-27 DIAGNOSIS — I10 ESSENTIAL (PRIMARY) HYPERTENSION: ICD-10-CM

## 2025-02-27 DIAGNOSIS — R79.89 ELEVATED LFTS: ICD-10-CM

## 2025-02-27 DIAGNOSIS — R74.01 ELEVATION OF LEVELS OF LIVER TRANSAMINASE LEVELS: ICD-10-CM

## 2025-02-27 DIAGNOSIS — E78.00 PURE HYPERCHOLESTEROLEMIA, UNSPECIFIED: ICD-10-CM

## 2025-02-27 DIAGNOSIS — N18.6 END STAGE RENAL DISEASE (HCC): Primary | ICD-10-CM

## 2025-02-27 DIAGNOSIS — R11.0 NAUSEA: ICD-10-CM

## 2025-02-27 DIAGNOSIS — E78.00 HYPERCHOLESTEREMIA: ICD-10-CM

## 2025-02-27 DIAGNOSIS — N18.30 STAGE 3 CHRONIC KIDNEY DISEASE, UNSPECIFIED WHETHER STAGE 3A OR 3B CKD (HCC): ICD-10-CM

## 2025-02-27 LAB
ANION GAP SERPL CALCULATED.3IONS-SCNC: 8 MMOL/L (ref 4–13)
BUN SERPL-MCNC: 23 MG/DL (ref 5–25)
CALCIUM SERPL-MCNC: 8.9 MG/DL (ref 8.4–10.2)
CHEST PAIN STATEMENT: NORMAL
CHLORIDE SERPL-SCNC: 112 MMOL/L (ref 96–108)
CO2 SERPL-SCNC: 24 MMOL/L (ref 21–32)
CREAT SERPL-MCNC: 2.59 MG/DL (ref 0.6–1.3)
GFR SERPL CREATININE-BSD FRML MDRD: 19 ML/MIN/1.73SQ M
GGT SERPL-CCNC: 17 U/L (ref 9–64)
GLUCOSE P FAST SERPL-MCNC: 101 MG/DL (ref 65–99)
MAX DIASTOLIC BP: 96 MMHG
MAX PREDICTED HEART RATE: 158 BPM
POTASSIUM SERPL-SCNC: 5.1 MMOL/L (ref 3.5–5.3)
PROTOCOL NAME: NORMAL
REASON FOR TERMINATION: NORMAL
SL CV REST NUCLEAR ISOTOPE DOSE: 10.5 MCI
SL CV STRESS NUCLEAR ISOTOPE DOSE: 32.7 MCI
SODIUM SERPL-SCNC: 144 MMOL/L (ref 135–147)
SPECT HRT GATED+EF W RNC IV: 68 %
STRESS ANGINA INDEX: 0
STRESS BASELINE HR: 75 BPM
STRESS POST EXERCISE DUR MIN: 3 MIN
STRESS POST EXERCISE DUR SEC: 0 SEC
STRESS POST PEAK BP: 163 MMHG
STRESS POST PEAK HR: 88 BPM
STRESS POST PEAK SYSTOLIC BP: 163 MMHG
STRESS/REST PERFUSION RATIO: 1.12
TARGET HR FORMULA: NORMAL
TEST INDICATION: NORMAL

## 2025-02-27 PROCEDURE — 93018 CV STRESS TEST I&R ONLY: CPT | Performed by: INTERNAL MEDICINE

## 2025-02-27 PROCEDURE — 93016 CV STRESS TEST SUPVJ ONLY: CPT | Performed by: INTERNAL MEDICINE

## 2025-02-27 PROCEDURE — 99214 OFFICE O/P EST MOD 30 MIN: CPT | Performed by: INTERNAL MEDICINE

## 2025-02-27 PROCEDURE — 78452 HT MUSCLE IMAGE SPECT MULT: CPT | Performed by: INTERNAL MEDICINE

## 2025-02-27 PROCEDURE — 80048 BASIC METABOLIC PNL TOTAL CA: CPT

## 2025-02-27 PROCEDURE — 82977 ASSAY OF GGT: CPT

## 2025-02-27 PROCEDURE — 78452 HT MUSCLE IMAGE SPECT MULT: CPT

## 2025-02-27 PROCEDURE — 36415 COLL VENOUS BLD VENIPUNCTURE: CPT

## 2025-02-27 PROCEDURE — 93000 ELECTROCARDIOGRAM COMPLETE: CPT | Performed by: INTERNAL MEDICINE

## 2025-02-27 PROCEDURE — 93017 CV STRESS TEST TRACING ONLY: CPT

## 2025-02-27 PROCEDURE — A9502 TC99M TETROFOSMIN: HCPCS

## 2025-02-27 RX ORDER — ONDANSETRON 4 MG/1
4 TABLET, FILM COATED ORAL EVERY 8 HOURS PRN
Qty: 30 TABLET | Refills: 0 | Status: SHIPPED | OUTPATIENT
Start: 2025-02-27

## 2025-02-27 RX ORDER — REGADENOSON 0.08 MG/ML
0.4 INJECTION, SOLUTION INTRAVENOUS ONCE
Status: COMPLETED | OUTPATIENT
Start: 2025-02-27 | End: 2025-02-27

## 2025-02-27 RX ORDER — ROSUVASTATIN CALCIUM 10 MG/1
10 TABLET, COATED ORAL EVERY EVENING
Qty: 90 TABLET | Refills: 3 | Status: SHIPPED | OUTPATIENT
Start: 2025-02-27

## 2025-02-27 RX ADMIN — REGADENOSON 0.4 MG: 0.08 INJECTION, SOLUTION INTRAVENOUS at 14:07

## 2025-02-27 NOTE — TELEPHONE ENCOUNTER
Reason for call:   [x] Refill   [] Prior Auth  [] Other:     Office:   [x] PCP/Provider -  Bette Harp  / [facility]   [] Specialty/Provider -     Medication: ondansetron (ZOFRAN) 4 mg tablet     Dose/Frequency: Take 1 tablet (4 mg total) by mouth every 8 (eight) hours as needed for nausea or vomiting,     Quantity: 30    Pharmacy: [facility] - [place] - [address] [facility].      Does the patient have enough for 3 days?   [] Yes   [x] No - Send as HP to POD

## 2025-02-27 NOTE — PROGRESS NOTES
Cardiology Outpatient Follow-Up Note - Ayleen Moralez 64 y.o. female MRN: [id number]      Assessment/Plan:  1. End stage renal disease (HCC) (Primary)  - Ambulatory Referral to Cardiology  - POCT ECG  - Echo follow up/limited w/ contrast if indicated; Future    2. Moderate aortic stenosis  - Echo follow up/limited w/ contrast if indicated; Future    3. Essential (primary) hypertension    4. Pure hypercholesterolemia, unspecified  - rosuvastatin (CRESTOR) 10 MG tablet; Take 1 tablet (10 mg total) by mouth every evening  Dispense: 90 tablet; Refill: 3    5. Hypercholesteremia      Patient presents today for preoperative clearance for renal transplant  At this time, NM SPECT MPI is still pending  Furthermore, needs repeat echocardiogram to reassess AS. Her calculated ALIYAH in Sep 2024 was 1.1 cm2 -- moderate but bordering severe AS. Should she progress to severe AS, this would likely need to be addressed prior to proceeding with a renal transplant surgery.     Repeat echo and await NM SPECT. Patient to follow-up for results of these tests with her regular cardiologist.         Subjective:     HPI: Ayleen Moralez is a 64 y.o. year old female with advanced CKD 4, moderate AS, HTN, asthma    She has about weekly episodes of chest pain or pressure. It is not exertional but she has a hard time exerting herself. She thinks it mostly happens after eating.       Cardiac Testing:      Echo 9/19/24    Interpretation Summary  Show Result Comparison     Left Ventricle: Left ventricular cavity size is normal. Wall thickness is mildly increased. The left ventricular ejection fraction is 65%. Systolic function is normal. Wall motion is normal. Diastolic function is normal for age.    Left Atrium: The atrium is mildly dilated.    Aortic Valve: The aortic valve is trileaflet. The leaflets are moderately calcified. There is moderately reduced mobility. There is trace regurgitation. There is moderate stenosis. The aortic valve  "mean gradient is 20.0 mmHg. The aortic valve area is 1.16 cm2.    Mitral Valve: There is mild annular calcification. There is mild to moderate regurgitation.    Tricuspid Valve: There is mild regurgitation.    Prior TTE study available for comparison. Prior study date: 9/8/2023. No significant changes noted compared to the prior study.            EKGs, personally reviewed:  2/27/25 - NSR, 75 bpm, RBBB, abnormal study    Relevant Labs & Results:  BMP 2/27/25 - K 5.1, Cr 2.59, GFR 19 mL/Min  CBC 2/19/25 - Hgb 11.7 g/dL     Lipid panel 1/21/25 - , , HDL 46,  mg/dL     ROS:  Review of Systems:  Review of Systems    Objective:     Vitals:   Vitals:    02/27/25 0938   BP: 124/68   BP Location: Right arm   Patient Position: Sitting   Cuff Size: Standard   Pulse: 75   Weight: 94.8 kg (209 lb)   Height: 5' 7\" (1.702 m)    Body surface area is 2.06 meters squared.  Wt Readings from Last 3 Encounters:   02/27/25 94.8 kg (209 lb)   02/21/25 91.3 kg (201 lb 3.2 oz)   02/14/25 91.5 kg (201 lb 12.8 oz)       Physical Exam:  General: Ayleen Moralez is a well appearing female, in no acute distress, sitting comfortably  HEENT: moist mucous membranes, EOMI  Neck:  No JVD, supple, trachea midline  Cardiovascular: unremarkable S1/S2, regular rate and rhythm,3/6 SM RUSB  Pulmonary: normal respiratory effort, CTAB  Abdomen: soft and nondistended  Extremities: No lower extremity edema. Warm and well perfused extremities.  Neuro: no focal motor deficits, AAOx3 (person, place, time)  Psych: Normal mood and affect, cooperative      Medications (at the START of this encounter):  Outpatient Medications Prior to Visit   Medication Sig Dispense Refill    acetaminophen (TYLENOL) 500 mg tablet Take 2 tablets (1,000 mg total) by mouth every 8 (eight) hours 60 tablet 0    albuterol (Ventolin HFA) 90 mcg/act inhaler Inhale 2 puffs every 6 (six) hours as needed for wheezing or shortness of breath 8.5 g 3    AMILoride 5 mg tablet " Take 1 tablet (5 mg total) by mouth daily 90 tablet 0    amLODIPine (NORVASC) 5 mg tablet TAKE 1 TABLET BY MOUTH DAILY 90 tablet 3    ascorbic acid (VITAMIN C) 500 MG tablet Take 1 tablet (500 mg total) by mouth 2 (two) times a day 60 tablet 2    aspirin 81 mg chewable tablet Chew 1 tablet (81 mg total) 2 (two) times a day 60 tablet 0    budesonide-formoterol (Symbicort) 160-4.5 mcg/act inhaler Inhale 2 puffs 2 (two) times a day Rinse mouth after use. 10.2 g 11    calcium carbonate (Calcium 600) 600 MG tablet Take 600 mg by mouth 2 (two) times a day with meals      carvedilol (COREG) 25 mg tablet TAKE 1 TABLET BY MOUTH TWICE  DAILY WITH MEALS 180 tablet 1    cholecalciferol (VITAMIN D3) 1,000 units tablet Take 5 tablets (5,000 Units total) by mouth daily 90 tablet 5    clonazePAM (KlonoPIN) 0.5 mg tablet Take 1 tablet (0.5 mg total) by mouth 3 (three) times a day Do not start before 2025. 270 tablet 0    Cyanocobalamin (VITAMIN B 12 PO) Take 1,000 mcg by mouth in the morning      denosumab (XGEVA) 120 mg/1.7 mL Inject 120 mg under the skin      dextromethorphan-guaifenesin (MUCINEX DM)  MG per 12 hr tablet Take 1 tablet by mouth every 12 (twelve) hours 60 tablet 0    dicyclomine (BENTYL) 10 mg capsule Take 1 capsule (10 mg total) by mouth 4 (four) times a day as needed (abdominal cramping) 30 capsule 0    ferrous sulfate 324 (65 Fe) mg Take 1 tablet (324 mg total) by mouth 2 (two) times a day before meals (Patient taking differently: Take 324 mg by mouth daily before breakfast) 60 tablet 2    fluticasone (FLONASE) 50 mcg/act nasal spray 2 sprays into each nostril daily as needed for rhinitis (congestion) 15.8 mL 0    fluvoxaMINE (LUVOX) 100 mg tablet Fluvoxamine 100m tablet in the morning ; 2 tablets at night 270 tablet 2    lamoTRIgine (LaMICtal) 200 MG tablet Lamotrigine 200m tablet in the morning , 1 tablet at night 180 tablet 3    montelukast (SINGULAIR) 10 mg tablet Take 1 tablet (10 mg  "total) by mouth daily at bedtime 30 tablet 5    Multiple Vitamin (MULTI-VITAMIN) tablet Take 1 tablet by mouth daily 30 tablet 2    omeprazole (PriLOSEC) 40 MG capsule TAKE 1 CAPSULE BY MOUTH DAILY  BEFORE BREAKFAST 90 capsule 3    ondansetron (ZOFRAN) 4 mg tablet Take 1 tablet (4 mg total) by mouth every 8 (eight) hours as needed for nausea or vomiting 30 tablet 0    QUEtiapine (SEROquel) 100 mg tablet Take 1 tablet (100 mg total) by mouth in the morning 90 tablet 3    QUEtiapine (SEROquel) 400 MG tablet Take 1 tablet (400 mg total) by mouth daily at bedtime Take with the Seroquel 100 mg to total 500 mg daily at bedtime (Patient taking differently: Take 500 mg by mouth daily at bedtime 500 mg daily at bedtime) 90 tablet 3    rosuvastatin (CRESTOR) 20 MG tablet TAKE 1 TABLET BY MOUTH IN THE  EVENING 90 tablet 3    traMADol (ULTRAM) 50 mg tablet Take 1 tablet (50 mg total) by mouth 2 (two) times a day as needed for moderate pain 60 tablet 0     No facility-administered medications prior to visit.                 This note was completed in part utilizing Dragon Medical One voice recognition software. Grammatical errors, random word insertion, spelling mistakes, occasional wrong word or \"sound-alike\" substitutions and incomplete sentences may be an occasional consequence of the system secondary to software limitations, ambient noise and hardware issues. At the time of dictation, efforts were made to edit, clarify and /or correct errors.  Please read the chart carefully and recognize, using context, where substitutions have occurred.  If you have any questions or concerns about the context, text or information contained within the body of this dictation, please contact myself, the provider, for further clarification.    "

## 2025-02-28 ENCOUNTER — RESULTS FOLLOW-UP (OUTPATIENT)
Dept: NEPHROLOGY | Facility: CLINIC | Age: 65
End: 2025-02-28

## 2025-02-28 NOTE — TELEPHONE ENCOUNTER
LVM for pt's sister regarding the following message     Arturo Mills, DO   Stable renal function posthospitalization, can you ensure she has a posthospital follow-up thank you     Asked pt's sister to please give us a call back to schedule a HFU.

## 2025-03-03 ENCOUNTER — TELEPHONE (OUTPATIENT)
Age: 65
End: 2025-03-03

## 2025-03-03 DIAGNOSIS — M79.605 BILATERAL LEG PAIN: ICD-10-CM

## 2025-03-03 DIAGNOSIS — M79.604 BILATERAL LEG PAIN: ICD-10-CM

## 2025-03-03 DIAGNOSIS — M54.50 LUMBAR PAIN: ICD-10-CM

## 2025-03-03 NOTE — TELEPHONE ENCOUNTER
----- Message from Arturo Mills DO sent at 2/27/2025  2:40 PM EST -----  Stable renal function posthospitalization, can you ensure she has a posthospital follow-up thank you

## 2025-03-03 NOTE — TELEPHONE ENCOUNTER
Patient called back to schedule hospital Follow up appointment. Unable to find an appointment with Dr. Mills or [facility] within appropriate amount of time.     Please give patient a call back to schedule.

## 2025-03-04 ENCOUNTER — OFFICE VISIT (OUTPATIENT)
Dept: BEHAVIORAL/MENTAL HEALTH CLINIC | Facility: CLINIC | Age: 65
End: 2025-03-04
Payer: MEDICARE

## 2025-03-04 DIAGNOSIS — F31.32 BIPOLAR I DISORDER, MOST RECENT EPISODE DEPRESSED, MODERATE (HCC): Primary | ICD-10-CM

## 2025-03-04 DIAGNOSIS — F41.9 ANXIETY DISORDER, UNSPECIFIED TYPE: ICD-10-CM

## 2025-03-04 DIAGNOSIS — F50.9 EATING DISORDER, UNSPECIFIED TYPE: ICD-10-CM

## 2025-03-04 PROCEDURE — 90837 PSYTX W PT 60 MINUTES: CPT | Performed by: COUNSELOR

## 2025-03-04 RX ORDER — TRAMADOL HYDROCHLORIDE 50 MG/1
50 TABLET ORAL 2 TIMES DAILY PRN
Qty: 60 TABLET | Refills: 0 | Status: SHIPPED | OUTPATIENT
Start: 2025-03-04

## 2025-03-04 NOTE — BH TREATMENT PLAN
"Outpatient Behavioral Health Psychotherapy Treatment Plan    Ayleen Martineztitokevyn  1960     Date of Initial Psychotherapy Assessment: Many years ago   Date of Current Treatment Plan: 03/04/25  Treatment Plan Target Date: TBD  Treatment Plan Expiration Date: 3-18-25    Diagnosis:   No diagnosis found.      Area(s) of Need: Bipolar, Depression, OCD and Anxiety     Long Term Goal 1 (in the client's own words):   Work on depression management.  As of 9-18-24, Ayleen said she has more anxiety and feels more depressed due to finances, lives with her sister and sister is planning on moving in the near future and has now told her she can't move with them (sister and niece).     Stage of Change: Action     Target Date for completion: TBD             Anticipated therapeutic modalities: Mindfulness, Motivational Interviewing             People identified to complete this goal: Client                     Objective 1: (identify the means of measuring success in meeting the objective): I will feel happier.                    Objective 2: (identify the means of measuring success in meeting the objective): I will be motivated.     Long Term Goal 2 (in the client's own words): Better control over my anxiety.  Client said she is making a little progress with this goal but said the anxiety is currently heightened due to stressors mentioned in Goal 1 above.  On waiting list for [facility].      Stage of Change: Action     Target Date for completion: TBD             Anticipated therapeutic modalities: Mindful, CBT             People identified to complete this goal: Client                    Objective 1: (identify the means of measuring success in meeting the objective): I will be more motivated to do things as opposed to being \"in a shell\" (more withdrawn)  Consider finding a hobby, and maybe new social supports.  Feel won't be accept me b/c I am fat:\".    Current weight 201 lbs.  Height 5'7\".                    Objective " 2: (identify the means of measuring success in meeting the objective): N/A      I am currently under the care of a [facility] psychiatric provider: yes    My [facility] psychiatric provider is: EMERITA Barnes    I am currently taking psychiatric medications: Yes, as prescribed    I feel that I will be ready for discharge from mental health care when I reach the following (measurable goal/objective): When goals are reached.    For children and adults who have a legal guardian:   Has there been any change to custody orders and/or guardianship status? NA. If yes, attach updated documentation.    I have created my Crisis Plan and have been offered a copy of this plan    Behavioral Health Treatment Plan [facility]: Diagnosis and Treatment Plan explained to Aylene Moralez acknowledges an understanding of their diagnosis. Ayleen Moralez agrees to this treatment plan.    I have been offered a copy of this Treatment Plan. yes

## 2025-03-04 NOTE — PSYCH
"Behavioral Health Psychotherapy Progress Note    Psychotherapy Provided: Individual Psychotherapy     1. Bipolar I disorder, most recent episode depressed, moderate (HCC)        2. Anxiety disorder, unspecified type        3. Eating disorder, unspecified type            Goals addressed in session: Goal 1     DATA:     Client shared about current events and status.  Reviewed treatment goals.  Explored any barriers to change and how to address them to move forward. Session focused on review of history, transfer from [facility].  Ct sees Kimberly FELDER for med mgmt.  Ct comfortable with this therapist and agreed to schedule forward.      \"I lost my [place] dog 2 years ago after 16 years.  I want to get another one, but my sister won't let me.  Due to limited finances, I live with my sister Radha (71) and my niece Ruthann (52).  My sister and niece each have 2 dogs.  I live in another section of the house b/c had knee replacement (left) and am unsteady on my feet.  I was in hospital 2 weeks ago for stomach issues and cardio issues (BP high.  One valve mostly blocked.  Need kidney transplant, but have to get valve fixed before can get back on the transplant list.)  Seeing cardiologist Monday, will discuss plan.  ECG was done and will be done again Monday, then review scheduled for the 25th.  Kidneys are compromised.  HBP. Pain issues in stomach, cramping.  Hx of colitis.  Rx'd dicyclomine 10 mg for cramping.\".  Ct. Has arthritis \"in my whole body\", and gets shots for same.  Gets out of breath with stairs.  Ct. Reports panic attacks - when lost driving.  Discussed how can learn to use GPS to build confidence driving.  \"On SSI.  No rep payee.  Fearful of losing benefits with [employer]. I have been  three times.  Still love second  Tito but has been 24 years.  I left him for someone 11 years younger, but he was not worth it.  I have 2 grown boys from second marriage - \"They are not talking to me.  I called on of " "their SO, Morena, a bitch about 2 years ago because she wrote that I was on medication and she said she would not let me drive the children.\".  Sons are Jamie 34, and Kal 30.  Tito is  to Morena (don't get along).  2 grand daughters by them  Never see them.  Jonna 6, and Patricia 3.  Ct reports that she drives and has her own car.  \"I was hospitalized about 6 times in the past.  [place] age 15.   Had suicide attmepts about 4 times with OD.  Last one 40 years ago. I have not been in the hospital for many years.  No current SI.  No guns in HH.  Get rid of old meds.  My best friend Mayra  in , about a week after my dad .  Mom passed in .  My sister is cold to me.  My Niece is nice, but too open with sister.  My other sister, Sherie (74) lives in [place].  Get along with her.  6 kids in family.  I am the youngest.  I have a history of an eating disorder - took laxatives and got down to 115 lbs.  I still worry people won't like me because I am too fat.\".     Celebrated growth where noted.  Processed feelings, explored insights and discussed coping skills.  Updated tx plan, see form.    During this session, this clinician used the following therapeutic modalities: Cognitive Behavioral Therapy, Dialectical Behavior Therapy, Mindfulness-based Strategies, Motivational Interviewing, and Supportive Psychotherapy    Substance Abuse was not addressed during this session. If the client is diagnosed with a co-occurring substance use disorder, please indicate any changes in the frequency or amount of use: not discussed today..     ASSESSMENT:  Ayleen Moralez presents with a  serious  mood.   her affect is Normal range and intensity, which is congruent, with her mood and the content of the session. The client has made progress on their goals.    Ayleen Moralez presents with a none risk of suicide, none risk of self-harm, and none risk of harm to others.    For any risk " "assessment that surpasses a \"low\" rating, a safety plan must be developed.    A safety plan was indicated: no  If yes, describe in detail NA    PLAN: Between sessions, Ayleen Moralez will continue to work on above issues and treatment goals.  . At the next session, the therapist will use Cognitive Behavioral Therapy, Dialectical Behavior Therapy, Mindfulness-based Strategies, Motivational Interviewing, and Supportive Psychotherapy to address same.  Rs'd monthly.    Behavioral Health Treatment Plan and Discharge Planning: Ayleen Moralez is aware of and agrees to continue to work on their treatment plan. They have identified and are working toward their discharge goals. yes    Depression Follow-up Plan Completed: Not applicable    Visit start and stop times:    03/04/25  Start Time: 1105  Stop Time: 1200  Total Visit Time: 55 minutes  "

## 2025-03-05 PROBLEM — F31.32 BIPOLAR I DISORDER, MOST RECENT EPISODE DEPRESSED, MODERATE (HCC): Status: ACTIVE | Noted: 2025-03-05

## 2025-03-05 NOTE — TELEPHONE ENCOUNTER
Pt called to cancel TCM Dr Harp appt today.  Can someone call her back to reschedule if possible since it's been almost the 14 days and nothing with Dr Harp in that time frame.

## 2025-03-06 ENCOUNTER — OFFICE VISIT (OUTPATIENT)
Dept: NEPHROLOGY | Facility: HOSPITAL | Age: 65
End: 2025-03-06
Payer: MEDICARE

## 2025-03-06 VITALS
DIASTOLIC BLOOD PRESSURE: 72 MMHG | WEIGHT: 197 LBS | SYSTOLIC BLOOD PRESSURE: 132 MMHG | BODY MASS INDEX: 30.92 KG/M2 | HEIGHT: 67 IN

## 2025-03-06 DIAGNOSIS — E23.2 DIABETES INSIPIDUS (HCC): ICD-10-CM

## 2025-03-06 DIAGNOSIS — I35.0 MODERATE AORTIC STENOSIS: ICD-10-CM

## 2025-03-06 DIAGNOSIS — N18.4 STAGE 4 CHRONIC KIDNEY DISEASE (HCC): Primary | ICD-10-CM

## 2025-03-06 DIAGNOSIS — I10 ESSENTIAL (PRIMARY) HYPERTENSION: ICD-10-CM

## 2025-03-06 PROCEDURE — 99214 OFFICE O/P EST MOD 30 MIN: CPT | Performed by: PHYSICIAN ASSISTANT

## 2025-03-06 NOTE — ASSESSMENT & PLAN NOTE
Following with cardiology on Monday  Recently saw transplant surgeon who noted she would likely need valve replacement prior to transplant

## 2025-03-06 NOTE — ASSESSMENT & PLAN NOTE
Baseline creatinine around 2-2.5  Recent prerenal acute kidney injury in the setting of viral gastroenteritis and amiloride.  Creatinine was 3.4 on admission and improved to 2.48 at discharge with IV fluids and holding amiloride.  Amiloride was resumed on discharge.  Repeat creatinine on 2/27 stable at 2.59  Following with Dr. Clayton for transplant evaluation  Access: Left radiocephalic fistula

## 2025-03-06 NOTE — PROGRESS NOTES
OFFICE FOLLOW UP - Nephrology   Ayleen Moralez 64 y.o. female MRN: [id number]     Assessment & Plan  Stage 4 chronic kidney disease (HCC)  Baseline creatinine around 2-2.5  Recent prerenal acute kidney injury in the setting of viral gastroenteritis and amiloride.  Creatinine was 3.4 on admission and improved to 2.48 at discharge with IV fluids and holding amiloride.  Amiloride was resumed on discharge.  Repeat creatinine on 2/27 stable at 2.59  Following with Dr. Clayton for transplant evaluation  Access: Left radiocephalic fistula  Essential (primary) hypertension  Blood pressure well-controlled  Continue current medications  Diabetes insipidus (HCC)  Sodium 144  On amiloride for partial DI  Encourage hydration  Moderate aortic stenosis  Following with cardiology on Monday  Recently saw transplant surgeon who noted she would likely need valve replacement prior to transplant        Plan:   Continue current medications  Follow up in June in our office as previously scheduled     HPI: Ayleen Moralez is a 64 y.o. female who is here for hospital follow-up.    Recent hospitalization 2/17 - 2/21 with 1 week of nausea and vomiting and diarrhea.  Felt to have viral gastroenteritis.  Stool studies were negative.  CT positive for mild left colitis.  Had acute kidney injury on admission which improved with IV fluids and holding amiloride.    Since discharge she has been doing okay.  She still feels weak and tired and has occasional abdominal pain.  She is eating and drinking okay and staying well-hydrated.  She no longer has diarrhea.    ROS:   A complete 10 point review of systems was done. Pertinent positives and negatives as noted in the HPI, otherwise the review of systems is negative.    Allergies: Molds & smuts, Bee pollen, and Pollen extract    Medications:   Current Outpatient Medications:     acetaminophen (TYLENOL) 500 mg tablet, Take 2 tablets (1,000 mg total) by mouth every 8 (eight) hours, Disp: 60 tablet,  Rfl: 0    albuterol (Ventolin HFA) 90 mcg/act inhaler, Inhale 2 puffs every 6 (six) hours as needed for wheezing or shortness of breath, Disp: 8.5 g, Rfl: 3    AMILoride 5 mg tablet, Take 1 tablet (5 mg total) by mouth daily, Disp: 90 tablet, Rfl: 0    amLODIPine (NORVASC) 5 mg tablet, TAKE 1 TABLET BY MOUTH DAILY, Disp: 90 tablet, Rfl: 3    ascorbic acid (VITAMIN C) 500 MG tablet, Take 1 tablet (500 mg total) by mouth 2 (two) times a day, Disp: 60 tablet, Rfl: 2    aspirin 81 mg chewable tablet, Chew 1 tablet (81 mg total) 2 (two) times a day, Disp: 60 tablet, Rfl: 0    budesonide-formoterol (Symbicort) 160-4.5 mcg/act inhaler, Inhale 2 puffs 2 (two) times a day Rinse mouth after use., Disp: 10.2 g, Rfl: 11    calcium carbonate (Calcium 600) 600 MG tablet, Take 600 mg by mouth 2 (two) times a day with meals, Disp: , Rfl:     carvedilol (COREG) 25 mg tablet, TAKE 1 TABLET BY MOUTH TWICE  DAILY WITH MEALS, Disp: 180 tablet, Rfl: 1    cholecalciferol (VITAMIN D3) 1,000 units tablet, Take 5 tablets (5,000 Units total) by mouth daily, Disp: 90 tablet, Rfl: 5    clonazePAM (KlonoPIN) 0.5 mg tablet, Take 1 tablet (0.5 mg total) by mouth 3 (three) times a day Do not start before January 2, 2025., Disp: 270 tablet, Rfl: 0    Cyanocobalamin (VITAMIN B 12 PO), Take 1,000 mcg by mouth in the morning, Disp: , Rfl:     denosumab (XGEVA) 120 mg/1.7 mL, Inject 120 mg under the skin, Disp: , Rfl:     dextromethorphan-guaifenesin (MUCINEX DM)  MG per 12 hr tablet, Take 1 tablet by mouth every 12 (twelve) hours, Disp: 60 tablet, Rfl: 0    dicyclomine (BENTYL) 10 mg capsule, Take 1 capsule (10 mg total) by mouth 4 (four) times a day as needed (abdominal cramping), Disp: 30 capsule, Rfl: 0    ferrous sulfate 324 (65 Fe) mg, Take 1 tablet (324 mg total) by mouth 2 (two) times a day before meals, Disp: 60 tablet, Rfl: 2    fluticasone (FLONASE) 50 mcg/act nasal spray, 2 sprays into each nostril daily as needed for rhinitis  (congestion), Disp: 15.8 mL, Rfl: 0    fluvoxaMINE (LUVOX) 100 mg tablet, Fluvoxamine 100m tablet in the morning ; 2 tablets at night, Disp: 270 tablet, Rfl: 2    lamoTRIgine (LaMICtal) 200 MG tablet, Lamotrigine 200m tablet in the morning , 1 tablet at night, Disp: 180 tablet, Rfl: 3    montelukast (SINGULAIR) 10 mg tablet, Take 1 tablet (10 mg total) by mouth daily at bedtime, Disp: 30 tablet, Rfl: 5    Multiple Vitamin (MULTI-VITAMIN) tablet, Take 1 tablet by mouth daily, Disp: 30 tablet, Rfl: 2    omeprazole (PriLOSEC) 40 MG capsule, TAKE 1 CAPSULE BY MOUTH DAILY  BEFORE BREAKFAST, Disp: 90 capsule, Rfl: 3    ondansetron (ZOFRAN) 4 mg tablet, Take 1 tablet (4 mg total) by mouth every 8 (eight) hours as needed for nausea or vomiting, Disp: 30 tablet, Rfl: 0    QUEtiapine (SEROquel) 100 mg tablet, Take 1 tablet (100 mg total) by mouth in the morning, Disp: 90 tablet, Rfl: 3    QUEtiapine (SEROquel) 400 MG tablet, Take 1 tablet (400 mg total) by mouth daily at bedtime Take with the Seroquel 100 mg to total 500 mg daily at bedtime, Disp: 90 tablet, Rfl: 3    rosuvastatin (CRESTOR) 10 MG tablet, Take 1 tablet (10 mg total) by mouth every evening, Disp: 90 tablet, Rfl: 3    traMADol (ULTRAM) 50 mg tablet, Take 1 tablet (50 mg total) by mouth 2 (two) times a day as needed for moderate pain, Disp: 60 tablet, Rfl: 0    Past Medical History:   Diagnosis Date    Aftercare following left knee joint replacement surgery 02/15/2024    Anxiety     Anxiety disorder     Arthritis     At risk for falls     Bipolar 2 disorder (HCC)     Chronic back pain     Chronic kidney disease     Closed fracture of distal end of right fibula with routine healing 2020    COVID-19     in 2021    CVA (cerebral vascular accident) (HCC)     noted on MRI in the past    Depression     GERD (gastroesophageal reflux disease)     Hypercholesteremia     Hypernatremia     Hypertension     Hypokalemia     Idiopathic chronic pancreatitis  (HCC) 2018    Intervertebral disc disorder with radiculopathy of lumbosacral region     resolved: 2015    Limb alert care status     LUE-fistula    Panic attacks     Pericardial effusion     PONV (postoperative nausea and vomiting)     Psychiatric problem     Radiculitis     resolved: 2015    Secondary renal hyperparathyroidism (HCC)     Stroke (HCC)     Vitamin D deficiency      Past Surgical History:   Procedure Laterality Date    BUNIONECTOMY      Left foot     CAST APPLICATION Right 2022    Procedure: Application short-arm thumb spica splint;  Surgeon: Lyndon Victoria MD;  [facility]: [facility];  Service: Orthopedics    COLON SURGERY      COLONOSCOPY  2018    COLONOSCOPY  2021    DILATION AND CURETTAGE OF UTERUS      INDUCED       surgically induced    NH ARTERIOVENOUS ANASTOMOSIS OPEN DIRECT Left 2019    Procedure: CREATION FISTULA ARTERIOVENOUS (AV) left wrists possible left upper;  Surgeon: Andrey Quintero MD;  [facility]: [facility];  Service: Vascular    NH ARTHRP KNE CONDYLE&PLATU MEDIAL&LAT COMPARTMENTS Left 2024    Procedure: ARTHROPLASTY KNEE TOTAL SAME DAY;  Surgeon: Riki Ma MD;  [facility];  Service: Orthopedics    [facility] DSTL METAR OSTEOT Left 2019    Procedure: Serge bunionectomy;  Surgeon: Munir Larkin DPM;  [facility];  Service: Podiatry    [facility] DSTL METAR OSTEOT Right 2020    Procedure: BUNIONECTOMY RAFAEL;  Surgeon: Munir Larkin DPM;  [facility]: [facility];  Service: Podiatry    NH [facility] PROX PHLX OSTEOT Right 2021    Procedure: BUNIONECTOMY TAMEKA, right tameka osteotomy and 2nd claw toe correction;  Surgeon: James R Lachman, MD;  Location: [facility];  Service: Orthopedics    NH ERCP DX COLLECTION SPECIMEN BRUSHING/WASHING N/A 2018    Procedure: ENDOSCOPIC RETROGRADE CHOLANGIOPANCREATOGRAPHY (ERCP);  Surgeon: Alfredo  MD Mayuri;  [facility];  Service: Gastroenterology    RI LAPAROSCOPY PROCTOPEXY PROLAPSE N/A 07/13/2018    Procedure: ROBOTIC SIGMOID RESECTION / RECTOPEXY;  Surgeon: ELPIDIO Mcnulty MD;  [facility];  Service: Colorectal    RI OPEN TREATMENT RADIAL SHAFT FRACTURE W/INT FIXJ Right 10/11/2021    Procedure: OPEN REDUCTION W/ INTERNAL FIXATION (ORIF) RADIUS (WRIST), RIGHT DISTAL;  Surgeon: Riki Ma MD;  [facility]: [facility];  Service: Orthopedics    RI REMOVAL IMPLANT DEEP Right 06/23/2022    Procedure: Removal of hardware volar aspect right distal radius (distal radial plate and screws);  Surgeon: Lyndon Victoria MD;  [facility];  Service: Orthopedics    RI SIGMOIDOSCOPY FLX DX W/COLLJ SPEC BR/WA IF PFRMD N/A 07/13/2018    Procedure: SIGMOIDOSCOPY FLEXIBLE;  Surgeon: ELPIDIO Mcnulty MD;  [facility];  Service: Colorectal    RI TR TDN RESTORE INTRNSC FUNCJ ALL 4 FNGRS Right 06/23/2022    Procedure: Right ring finger flexor digitorum superficialis to flexor pollicis longus tendon transfer;  Surgeon: Lyndon Victoria MD;  Location: [facility];  Service: Orthopedics    TUBAL LIGATION Bilateral 1997    US GUIDED THYROID BIOPSY  07/30/2019     Family History   Problem Relation Age of Onset    Bipolar disorder Mother     Mental illness Mother         depression    Stroke Mother     Dementia Mother     Colon polyps Mother     Heart disease Father     Hypertension Father     Diabetes Father     Mental illness Sister     Colon polyps Sister     Mental illness Sister     Heart disease Sister     No Known Problems Sister     Breast cancer Sister 68    No Known Problems Maternal Grandmother     No Known Problems Maternal Grandfather     Breast cancer Paternal Grandmother         age unknown    No Known Problems Paternal Grandfather     Hypertension Son     Obesity Son     No Known Problems Son     Breast cancer Maternal Aunt         age unknown    Breast cancer Paternal Aunt   "       age unknown    Breast cancer Paternal Aunt         age unknown    Diabetes Family     Heart disease Family     Hypertension Family     Stroke Family     Thyroid disease Family     Throat cancer Niece 48    Substance Abuse Neg Hx         neg fam hx    Colon cancer Neg Hx       reports that she has never smoked. She has never used smokeless tobacco. She reports that she does not currently use alcohol. She reports that she does not currently use drugs after having used the following drugs: Hydrocodone and Marijuana.      Physical Exam:   Vitals:    03/06/25 1317   BP: 132/72   BP Location: Right arm   Patient Position: Sitting   Cuff Size: Large   Weight: 89.4 kg (197 lb)   Height: 5' 7\" (1.702 m)     Body mass index is 30.85 kg/m².    General: no acute distress   Eyes: conjunctivae pink, anicteric sclerae  ENT: mucous membranes moist  Neck: supple, no JVD  Chest: clear to auscultation bilaterally with no wheezes, rale or rhochi  CVS: regular rate and rhythm   Abdomen: soft, non-tender, non-distended  Extremities: no lower extremity edema   Skin: no rash  Neuro: awake and alert       Lab Results:  Results for orders placed or performed during the hospital encounter of 02/27/25   Stress strip   Result Value Ref Range    Protocol Name SANTANA SIT     Exercise duration (min) 3 min    Exercise duration (sec) 0 sec    Post Peak Systolic  mmHg    Max Diastolic Bp 96 mmHg    Peak HR 88 BPM    Max Predicted Heart Rate 158 BPM    Reason for Termination End of Vasodilator Protocol     Test Indication pre-op     Target Hr Formular (220 - Age)*85%     Arrhy During Ex      ECG Interp Before Ex      ECG Interp during Ex      Ex Summary Comment      Chest Pain Statement none     Overall Hr Response To Exercise      Overall BP Response To Exercise       *Note: Due to a large number of results and/or encounters for the requested time period, some results have not been displayed. A complete set of results can be found in " "Results Review.             Invalid input(s): \"ALBUMIN\"        Portions of the record may have been created with voice recognition software. Occasional wrong word or \"sound a like\" substitutions may have occurred due to the inherent limitations of voice recognition software. Read the chart carefully and recognize, using context, where substitutions have occurred.If you have any questions, please contact the dictating provider.  "

## 2025-03-07 ENCOUNTER — TELEPHONE (OUTPATIENT)
Age: 65
End: 2025-03-07

## 2025-03-07 NOTE — TELEPHONE ENCOUNTER
The pt called asking if her message to Dr. Mills got to him - I looked into her chart and didn't see any message but asked her if this was something she asked at her appt yesterday with Amrita Leo. She said she called and spoke with the girl at the desk and that she wanted to switch to another doctor. She was made a 3 month followup appt with Gracie for 6-10-25 which she wanted to cx and a MARLYS appt was made with Dr. Perez for 9-3-25. She is asking if she can now have the 6-10-25 appt back (which was still open and she was rescheduled) and then make an appt with Dr. Mills for September. I asked her if she wanted to stay with him and cx the appt with Dr. Perez which after repeating to her that she can't see both and to decided who she would like to have she said I will stick with Dr. Mills so the appt for MARLYS was also cx. She asked if I could make her an appt with Dr Mills ( I let her know next available was November) and she said she will keep one with Gracie for June and I stated once she is at the appt she will let her know when she should come back and at time of checkout they can make that next appt for her. She verbalized understanding.

## 2025-03-10 ENCOUNTER — HOSPITAL ENCOUNTER (OUTPATIENT)
Dept: NON INVASIVE DIAGNOSTICS | Age: 65
Discharge: HOME | End: 2025-03-10
Payer: MEDICARE

## 2025-03-10 VITALS
WEIGHT: 197 LBS | HEART RATE: 88 BPM | SYSTOLIC BLOOD PRESSURE: 124 MMHG | DIASTOLIC BLOOD PRESSURE: 68 MMHG | BODY MASS INDEX: 30.92 KG/M2 | HEIGHT: 67 IN

## 2025-03-10 DIAGNOSIS — I35.0 MODERATE AORTIC STENOSIS: ICD-10-CM

## 2025-03-10 DIAGNOSIS — N18.6 END STAGE RENAL DISEASE (HCC): ICD-10-CM

## 2025-03-10 PROCEDURE — 93308 TTE F-UP OR LMTD: CPT

## 2025-03-10 PROCEDURE — 93321 DOPPLER ECHO F-UP/LMTD STD: CPT | Performed by: INTERNAL MEDICINE

## 2025-03-10 PROCEDURE — 93325 DOPPLER ECHO COLOR FLOW MAPG: CPT | Performed by: INTERNAL MEDICINE

## 2025-03-10 PROCEDURE — 93308 TTE F-UP OR LMTD: CPT | Performed by: INTERNAL MEDICINE

## 2025-03-10 PROCEDURE — 93356 MYOCRD STRAIN IMG SPCKL TRCK: CPT | Performed by: INTERNAL MEDICINE

## 2025-03-10 PROCEDURE — 93321 DOPPLER ECHO F-UP/LMTD STD: CPT

## 2025-03-10 PROCEDURE — 93325 DOPPLER ECHO COLOR FLOW MAPG: CPT

## 2025-03-11 ENCOUNTER — OFFICE VISIT (OUTPATIENT)
Dept: FAMILY MEDICINE CLINIC | Facility: HOSPITAL | Age: 65
End: 2025-03-11
Payer: MEDICARE

## 2025-03-11 VITALS
BODY MASS INDEX: 30.54 KG/M2 | SYSTOLIC BLOOD PRESSURE: 128 MMHG | WEIGHT: 195 LBS | DIASTOLIC BLOOD PRESSURE: 74 MMHG | HEART RATE: 88 BPM | OXYGEN SATURATION: 93 %

## 2025-03-11 DIAGNOSIS — R19.7 DIARRHEA, UNSPECIFIED TYPE: ICD-10-CM

## 2025-03-11 DIAGNOSIS — N18.4 STAGE 4 CHRONIC KIDNEY DISEASE (HCC): Primary | ICD-10-CM

## 2025-03-11 DIAGNOSIS — F11.20 OPIOID DEPENDENCE, UNCOMPLICATED (HCC): ICD-10-CM

## 2025-03-11 DIAGNOSIS — I10 ESSENTIAL (PRIMARY) HYPERTENSION: ICD-10-CM

## 2025-03-11 DIAGNOSIS — E78.00 PURE HYPERCHOLESTEROLEMIA, UNSPECIFIED: ICD-10-CM

## 2025-03-11 DIAGNOSIS — M17.12 PRIMARY OSTEOARTHRITIS OF LEFT KNEE: ICD-10-CM

## 2025-03-11 DIAGNOSIS — F31.2 BIPOLAR DISORDER, CURRENT EPISODE MANIC SEVERE WITH PSYCHOTIC FEATURES (HCC): ICD-10-CM

## 2025-03-11 DIAGNOSIS — E55.9 VITAMIN D DEFICIENCY, UNSPECIFIED: ICD-10-CM

## 2025-03-11 DIAGNOSIS — J84.9 ILD (INTERSTITIAL LUNG DISEASE) (HCC): ICD-10-CM

## 2025-03-11 DIAGNOSIS — I35.0 MODERATE AORTIC STENOSIS: ICD-10-CM

## 2025-03-11 PROBLEM — N17.9 AKI (ACUTE KIDNEY INJURY) (HCC): Status: RESOLVED | Noted: 2025-02-19 | Resolved: 2025-03-11

## 2025-03-11 PROBLEM — N17.9 ACUTE KIDNEY INJURY SUPERIMPOSED ON STAGE 4 CHRONIC KIDNEY DISEASE (HCC): Status: RESOLVED | Noted: 2017-01-17 | Resolved: 2025-03-11

## 2025-03-11 PROCEDURE — G2211 COMPLEX E/M VISIT ADD ON: HCPCS | Performed by: INTERNAL MEDICINE

## 2025-03-11 PROCEDURE — 99215 OFFICE O/P EST HI 40 MIN: CPT | Performed by: INTERNAL MEDICINE

## 2025-03-11 RX ORDER — ASPIRIN 81 MG/1
81 TABLET, CHEWABLE ORAL DAILY
Qty: 60 TABLET | Refills: 0 | Status: SHIPPED | OUTPATIENT
Start: 2025-03-11

## 2025-03-11 RX ORDER — DICYCLOMINE HYDROCHLORIDE 10 MG/1
10 CAPSULE ORAL 4 TIMES DAILY PRN
Qty: 30 CAPSULE | Refills: 0 | Status: SHIPPED | OUTPATIENT
Start: 2025-03-11

## 2025-03-11 NOTE — PROGRESS NOTES
Transition of Care Visit  Name: Ayleen Moralez      : 1960      MRN: [id number]  Encounter Provider: Bette Harp DO  Encounter Date: 3/11/2025   Encounter department: [facility] C[facility] [address]    Assessment & Plan  Diarrhea, unspecified type    Orders:  •  dicyclomine (BENTYL) 10 mg capsule; Take 1 capsule (10 mg total) by mouth 4 (four) times a day as needed (abdominal cramping)    Essential (primary) hypertension  Bp readings at home are good       Stage 4 chronic kidney disease (HCC)  Lab Results   Component Value Date    EGFR 19 2025    EGFR 20 2025    EGFR 18 2025    CREATININE 2.59 (H) 2025    CREATININE 2.48 (H) 2025    CREATININE 2.63 (H) 2025   Will repeat labs later this week- copy to Dr. Bell  Orders:  •  CBC and differential; Future  •  Comprehensive metabolic panel; Future  •  Magnesium; Future  •  Phosphorus; Future  •  Urinalysis with microscopic; Future    Bipolar disorder, current episode manic severe with psychotic features (HCC)  [facility]       Pure hypercholesterolemia, unspecified  Dr. Winslow had decreased her crestor form 10 mg to 5 mg daily       Vitamin D deficiency, unspecified  On 5000 units of vit D daily       Primary osteoarthritis of left knee    Orders:  •  aspirin 81 mg chewable tablet; Chew 1 tablet (81 mg total) daily    Moderate aortic stenosis  Await results form repat echo done yesterday to see if pt needs avr consideration before being placed back on transplant list       Opioid dependence, uncomplicated (HCC)         ILD (interstitial lung disease) (HCC)              History of Present Illness     Transitional Care Management Review:   Ayleen Moralez is a 64 y.o. female here for TCM follow up.     During the TCM phone call patient stated:  TCM Call (since 2025)     Hospital care reviewed  Records reviewed    Patient was hospitialized at  [facility]  [facility]    Date of Admission  02/17/25    Date of discharge  02/21/25    Diagnosis  CAM stage 4    Disposition  Home    Were the patients medications reviewed and updated  Yes    Current Symptoms  None      TCM Call (since 2/25/2025)     Should patient be enrolled in anticoag monitoring?  No    Scheduled for follow up?  Yes    Did you obtain your prescribed medications  Yes    Do you need help managing your prescriptions or medications  No    Is transportation to your appointment needed  No    I have advised the patient to call PCP with any new or worsening symptoms  Nati Muhammad MA - [facility]    Living Arrangements  Alone    Support System  Family; Friends    Are you recieving any outpatient services  No    Are you recieving home care services  No        Here for tcm visit but is beyond the 14 day tcm visit time for billing- was given appt last week- she called and cancelled and was on our  schedule for tomorrow but she showed up today instead.   [place]  2/12 to 2/21 discharged- had had  CAM due to a week of diarrhea and dehydration-   baseline crt was 2.6- and was 3.44 at arrival to [facility]- given iv fuilds and meds adjusted .She had negative stools for infection and  was given dicyclomine fro abdominal cramping. Crt returned to baseline and was discharged on 2/21/   She has has been on renal transplant list at [place]-x 2 years - Dr. Short- last  seen  on 2/28/25 and told that she would be off the list currently as there was concern that  cardiology needed to clear due to concerns over moderate aortic stenosis-   She was seen by Dr. Winslow cardiology and stress test was done with no evidence on nm study  of ischemia- had some breast artifact -   had echo done yesterday but final readings are  pending to assess the aortic stenosis    Also on chart is dx of prior pulmonary embolus with infarct --added in August 2023 but during the hospital stay her d dimer was elevated( in setting of ckd  stage 4 uncertain of significance) vq scan and venous doppler were unremarkable- due to ckd  decision was made not to do iv contrast ct- I feel this is a spurious result- she is not aware of any family hx of clotting issues but is not the best historian , I will have her ask her sister to come to next appt to see if additional accurate history can be obtained- I feel this may be  spurious  entry and have   blocked it for now    Will have her continue on aspirin 81 mg daily as she does have a prior hx of cva      Review of Systems   Constitutional:  Positive for fatigue.   Respiratory:  Negative for cough.    Neurological:  Negative for headaches.   Psychiatric/Behavioral:  Positive for decreased concentration and dysphoric mood.         She feels stable on her current medications and follows with [facility]     Objective   /74   Pulse 88   Wt 88.5 kg (195 lb)   LMP  (LMP Unknown)   SpO2 93%   BMI 30.54 kg/m²     Physical Exam  Vitals and nursing note reviewed.   Constitutional:       General: She is not in acute distress.     Appearance: She is not toxic-appearing.   HENT:      Head: Normocephalic.      Right Ear: Tympanic membrane normal. There is no impacted cerumen.      Left Ear: Tympanic membrane normal. There is no impacted cerumen.      Nose: No congestion.      Mouth/Throat:      Pharynx: No oropharyngeal exudate.   Eyes:      General:         Right eye: No discharge.         Left eye: No discharge.      Conjunctiva/sclera: Conjunctivae normal.   Cardiovascular:      Rate and Rhythm: Normal rate and regular rhythm.      Heart sounds: No murmur heard.  Pulmonary:      Breath sounds: No wheezing.      Comments: Decreased bs in bases  Abdominal:      Palpations: Abdomen is soft.      Tenderness: There is abdominal tenderness. There is no rebound.      Comments: Mild epigastric tenderness   Musculoskeletal:         General: No tenderness.      Right lower leg: No edema.      Left lower leg: No  edema.   Skin:     Findings: No erythema or rash.   Neurological:      Mental Status: Mental status is at baseline.      Comments: Some hesitancy with long term recall   Psychiatric:      Comments: At baseline- good eye contact and smiles at times- then has more depressed but appropriate response  when discussing  her frustration of  being taken off the kidney transplant list       Medications have been reviewed by provider in current encounter    Administrative Statements   I have spent a total time of 65 minutes in caring for this patient on the day of the visit/encounter including Diagnostic results, Risks and benefits of tx options, Impressions, Documenting in the medical record, Reviewing/placing orders in the medical record (including tests, medications, and/or procedures), and Obtaining or reviewing history  .

## 2025-03-11 NOTE — ASSESSMENT & PLAN NOTE
Orders:  •  dicyclomine (BENTYL) 10 mg capsule; Take 1 capsule (10 mg total) by mouth 4 (four) times a day as needed (abdominal cramping)

## 2025-03-11 NOTE — ASSESSMENT & PLAN NOTE
Await results form repat echo done yesterday to see if pt needs avr consideration before being placed back on transplant list

## 2025-03-11 NOTE — ASSESSMENT & PLAN NOTE
Lab Results   Component Value Date    EGFR 19 02/27/2025    EGFR 20 02/21/2025    EGFR 18 02/20/2025    CREATININE 2.59 (H) 02/27/2025    CREATININE 2.48 (H) 02/21/2025    CREATININE 2.63 (H) 02/20/2025   Will repeat labs later this week- copy to Dr. Bell  Orders:  •  CBC and differential; Future  •  Comprehensive metabolic panel; Future  •  Magnesium; Future  •  Phosphorus; Future  •  Urinalysis with microscopic; Future

## 2025-03-12 DIAGNOSIS — I10 ESSENTIAL HYPERTENSION: ICD-10-CM

## 2025-03-12 DIAGNOSIS — E23.2 DIABETES INSIPIDUS (HCC): ICD-10-CM

## 2025-03-12 DIAGNOSIS — N25.81 SECONDARY RENAL HYPERPARATHYROIDISM (HCC): ICD-10-CM

## 2025-03-12 DIAGNOSIS — N28.1 RENAL CYST: ICD-10-CM

## 2025-03-12 DIAGNOSIS — N18.30 CHRONIC KIDNEY DISEASE, STAGE 3 (HCC): ICD-10-CM

## 2025-03-12 LAB
AORTIC ROOT: 3.4 CM
AORTIC VALVE MEAN VELOCITY: 19.1 M/S
ASCENDING AORTA: 2.9 CM
AV AREA BY CONTINUOUS VTI: 1.8 CM2
AV AREA PEAK VELOCITY: 1.6 CM2
AV LVOT MEAN GRADIENT: 5 MMHG
AV LVOT PEAK GRADIENT: 9 MMHG
AV MEAN PRESS GRAD SYS DOP V1V2: 18 MMHG
AV ORIFICE AREA US: 1.78 CM2
AV PEAK GRADIENT: 34 MMHG
AV VELOCITY RATIO: 0.57
AV VMAX SYS DOP: 2.92 M/S
BSA FOR ECHO PROCEDURE: 2.01 M2
DOP CALC AO VTI: 49.93 CM
DOP CALC LVOT AREA: 3.14 CM2
DOP CALC LVOT CARDIAC INDEX: 3.84 L/MIN/M2
DOP CALC LVOT CARDIAC OUTPUT: 7.71 L/MIN
DOP CALC LVOT DIAMETER: 2 CM
DOP CALC LVOT PEAK VEL VTI: 28.28 CM
DOP CALC LVOT PEAK VEL: 1.49 M/S
DOP CALC LVOT STROKE INDEX: 44.3 ML/M2
DOP CALC LVOT STROKE VOLUME: 88.8
E WAVE DECELERATION TIME: 289 MS
E/A RATIO: 0.76
FRACTIONAL SHORTENING: 36 (ref 28–44)
GLOBAL LONGITUIDAL STRAIN: -18 %
INTERVENTRICULAR SEPTUM IN DIASTOLE (PARASTERNAL SHORT AXIS VIEW): 1.3 CM
INTERVENTRICULAR SEPTUM: 1.3 CM (ref 0.6–1.1)
LAAS-AP2: 22.1 CM2
LAAS-AP4: 22.2 CM2
LEFT ATRIUM SIZE: 2.7 CM
LEFT ATRIUM VOLUME (MOD BIPLANE): 63 ML
LEFT ATRIUM VOLUME INDEX (MOD BIPLANE): 31.3 ML/M2
LEFT INTERNAL DIMENSION IN SYSTOLE: 2.1 CM (ref 2.1–4)
LEFT VENTRICLE DIASTOLIC VOLUME (MOD BIPLANE): 109 ML
LEFT VENTRICLE DIASTOLIC VOLUME INDEX (MOD BIPLANE): 54.2 ML/M2
LEFT VENTRICLE SYSTOLIC VOLUME (MOD BIPLANE): 41 ML
LEFT VENTRICLE SYSTOLIC VOLUME INDEX (MOD BIPLANE): 20.4 ML/M2
LEFT VENTRICULAR INTERNAL DIMENSION IN DIASTOLE: 3.3 CM (ref 3.5–6)
LEFT VENTRICULAR POSTERIOR WALL IN END DIASTOLE: 1.3 CM
LEFT VENTRICULAR STROKE VOLUME: 30 ML
LV EF BIPLANE MOD: 63 %
LV EF US.2D.A4C+ESTIMATED: 60 %
LVSV (TEICH): 30 ML
MV E'TISSUE VEL-LAT: 9 CM/S
MV E'TISSUE VEL-SEP: 6 CM/S
MV PEAK A VEL: 0.9 M/S
MV PEAK E VEL: 68 CM/S
MV STENOSIS PRESSURE HALF TIME: 84 MS
MV VALVE AREA P 1/2 METHOD: 2.62
RIGHT ATRIUM AREA SYSTOLE A4C: 17.2 CM2
RIGHT VENTRICLE ID DIMENSION: 3.3 CM
SL CV LEFT ATRIUM LENGTH A2C: 6.2 CM
SL CV LV EF: 65
SL CV PED ECHO LEFT VENTRICLE DIASTOLIC VOLUME (MOD BIPLANE) 2D: 45 ML
SL CV PED ECHO LEFT VENTRICLE SYSTOLIC VOLUME (MOD BIPLANE) 2D: 15 ML
TR MAX PG: 20 MMHG
TR PEAK VELOCITY: 2.2 M/S
TRICUSPID ANNULAR PLANE SYSTOLIC EXCURSION: 1.8 CM
TRICUSPID VALVE PEAK REGURGITATION VELOCITY: 2.23 M/S

## 2025-03-13 ENCOUNTER — APPOINTMENT (OUTPATIENT)
Dept: LAB | Facility: HOSPITAL | Age: 65
End: 2025-03-13
Payer: MEDICARE

## 2025-03-13 DIAGNOSIS — M81.0 AGE-RELATED OSTEOPOROSIS WITHOUT CURRENT PATHOLOGICAL FRACTURE: ICD-10-CM

## 2025-03-13 DIAGNOSIS — N18.4 STAGE 4 CHRONIC KIDNEY DISEASE (HCC): ICD-10-CM

## 2025-03-13 DIAGNOSIS — N25.81 SECONDARY HYPERPARATHYROIDISM OF RENAL ORIGIN (HCC): ICD-10-CM

## 2025-03-13 DIAGNOSIS — E04.1 THYROID NODULE: ICD-10-CM

## 2025-03-13 LAB
ALBUMIN SERPL BCG-MCNC: 4 G/DL (ref 3.5–5)
ALP SERPL-CCNC: 114 U/L (ref 34–104)
ALT SERPL W P-5'-P-CCNC: 20 U/L (ref 7–52)
ANION GAP SERPL CALCULATED.3IONS-SCNC: 10 MMOL/L (ref 4–13)
AST SERPL W P-5'-P-CCNC: 24 U/L (ref 13–39)
BACTERIA UR QL AUTO: ABNORMAL /HPF
BASOPHILS # BLD AUTO: 0.08 THOUSANDS/ÂΜL (ref 0–0.1)
BASOPHILS NFR BLD AUTO: 1 % (ref 0–1)
BILIRUB SERPL-MCNC: 0.33 MG/DL (ref 0.2–1)
BILIRUB UR QL STRIP: NEGATIVE
BUN SERPL-MCNC: 26 MG/DL (ref 5–25)
CALCIUM SERPL-MCNC: 9.9 MG/DL (ref 8.4–10.2)
CHLORIDE SERPL-SCNC: 112 MMOL/L (ref 96–108)
CLARITY UR: CLEAR
CO2 SERPL-SCNC: 23 MMOL/L (ref 21–32)
COLOR UR: COLORLESS
CREAT SERPL-MCNC: 3.25 MG/DL (ref 0.6–1.3)
EOSINOPHIL # BLD AUTO: 0.55 THOUSAND/ÂΜL (ref 0–0.61)
EOSINOPHIL NFR BLD AUTO: 8 % (ref 0–6)
ERYTHROCYTE [DISTWIDTH] IN BLOOD BY AUTOMATED COUNT: 12.7 % (ref 11.6–15.1)
GFR SERPL CREATININE-BSD FRML MDRD: 14 ML/MIN/1.73SQ M
GLUCOSE P FAST SERPL-MCNC: 105 MG/DL (ref 65–99)
GLUCOSE UR STRIP-MCNC: NEGATIVE MG/DL
HCT VFR BLD AUTO: 38.2 % (ref 34.8–46.1)
HGB BLD-MCNC: 12.2 G/DL (ref 11.5–15.4)
HGB UR QL STRIP.AUTO: NEGATIVE
IMM GRANULOCYTES # BLD AUTO: 0.02 THOUSAND/UL (ref 0–0.2)
IMM GRANULOCYTES NFR BLD AUTO: 0 % (ref 0–2)
KETONES UR STRIP-MCNC: NEGATIVE MG/DL
LEUKOCYTE ESTERASE UR QL STRIP: ABNORMAL
LYMPHOCYTES # BLD AUTO: 2.26 THOUSANDS/ÂΜL (ref 0.6–4.47)
LYMPHOCYTES NFR BLD AUTO: 33 % (ref 14–44)
MAGNESIUM SERPL-MCNC: 1.9 MG/DL (ref 1.9–2.7)
MCH RBC QN AUTO: 32.8 PG (ref 26.8–34.3)
MCHC RBC AUTO-ENTMCNC: 31.9 G/DL (ref 31.4–37.4)
MCV RBC AUTO: 103 FL (ref 82–98)
MONOCYTES # BLD AUTO: 0.63 THOUSAND/ÂΜL (ref 0.17–1.22)
MONOCYTES NFR BLD AUTO: 9 % (ref 4–12)
NEUTROPHILS # BLD AUTO: 3.27 THOUSANDS/ÂΜL (ref 1.85–7.62)
NEUTS SEG NFR BLD AUTO: 49 % (ref 43–75)
NITRITE UR QL STRIP: NEGATIVE
NON-SQ EPI CELLS URNS QL MICRO: ABNORMAL /HPF
NRBC BLD AUTO-RTO: 0 /100 WBCS
PH UR STRIP.AUTO: 6.5 [PH]
PHOSPHATE SERPL-MCNC: 4.3 MG/DL (ref 2.3–4.1)
PLATELET # BLD AUTO: 250 THOUSANDS/UL (ref 149–390)
PMV BLD AUTO: 11.3 FL (ref 8.9–12.7)
POTASSIUM SERPL-SCNC: 4.1 MMOL/L (ref 3.5–5.3)
PROT SERPL-MCNC: 6.6 G/DL (ref 6.4–8.4)
PROT UR STRIP-MCNC: ABNORMAL MG/DL
RBC # BLD AUTO: 3.72 MILLION/UL (ref 3.81–5.12)
RBC #/AREA URNS AUTO: ABNORMAL /HPF
SODIUM SERPL-SCNC: 145 MMOL/L (ref 135–147)
SP GR UR STRIP.AUTO: 1.01 (ref 1–1.03)
UROBILINOGEN UR STRIP-ACNC: <2 MG/DL
WBC # BLD AUTO: 6.81 THOUSAND/UL (ref 4.31–10.16)
WBC #/AREA URNS AUTO: ABNORMAL /HPF

## 2025-03-13 PROCEDURE — 83735 ASSAY OF MAGNESIUM: CPT

## 2025-03-13 PROCEDURE — 85025 COMPLETE CBC W/AUTO DIFF WBC: CPT

## 2025-03-13 PROCEDURE — 84100 ASSAY OF PHOSPHORUS: CPT

## 2025-03-13 PROCEDURE — 36415 COLL VENOUS BLD VENIPUNCTURE: CPT

## 2025-03-13 PROCEDURE — 81001 URINALYSIS AUTO W/SCOPE: CPT

## 2025-03-13 PROCEDURE — 80053 COMPREHEN METABOLIC PANEL: CPT

## 2025-03-13 RX ORDER — CARVEDILOL 25 MG/1
25 TABLET ORAL 2 TIMES DAILY WITH MEALS
Qty: 180 TABLET | Refills: 1 | Status: SHIPPED | OUTPATIENT
Start: 2025-03-13

## 2025-03-14 ENCOUNTER — TELEPHONE (OUTPATIENT)
Age: 65
End: 2025-03-14

## 2025-03-14 ENCOUNTER — TELEPHONE (OUTPATIENT)
Dept: NEPHROLOGY | Facility: CLINIC | Age: 65
End: 2025-03-14

## 2025-03-14 DIAGNOSIS — N18.4 STAGE 4 CHRONIC KIDNEY DISEASE (HCC): ICD-10-CM

## 2025-03-14 DIAGNOSIS — I10 ESSENTIAL HYPERTENSION: Primary | ICD-10-CM

## 2025-03-14 NOTE — TELEPHONE ENCOUNTER
Patient called back asking if we can repeat message to her from provider. Message was relayed. No further questions at this time.

## 2025-03-14 NOTE — TELEPHONE ENCOUNTER
Caller: Ayleen Moralez    Doctor: Dr. Garcia    Reason for call: She is calling regarding her test results from last week.  Please call her.    Thank you    Call back#: [phone]

## 2025-03-14 NOTE — TELEPHONE ENCOUNTER
----- Message from Amrita Leo PA-C sent at 3/14/2025  7:26 AM EDT -----  Please let patient know her creatinine yesterday worsened to 3.25. She needs to increase her fluid intake and hold her amiloride for now. Please have her repeat BMP on Monday with results to Dr Mills (I am off Monday)

## 2025-03-17 ENCOUNTER — APPOINTMENT (OUTPATIENT)
Dept: LAB | Facility: HOSPITAL | Age: 65
End: 2025-03-17
Payer: MEDICARE

## 2025-03-17 DIAGNOSIS — N18.4 STAGE 4 CHRONIC KIDNEY DISEASE (HCC): Primary | ICD-10-CM

## 2025-03-17 DIAGNOSIS — N18.4 STAGE 4 CHRONIC KIDNEY DISEASE (HCC): ICD-10-CM

## 2025-03-17 DIAGNOSIS — E23.2 DIABETES INSIPIDUS (HCC): ICD-10-CM

## 2025-03-17 DIAGNOSIS — I10 ESSENTIAL HYPERTENSION: ICD-10-CM

## 2025-03-17 LAB
ANION GAP SERPL CALCULATED.3IONS-SCNC: 6 MMOL/L (ref 4–13)
BUN SERPL-MCNC: 28 MG/DL (ref 5–25)
CALCIUM SERPL-MCNC: 9.4 MG/DL (ref 8.4–10.2)
CHLORIDE SERPL-SCNC: 112 MMOL/L (ref 96–108)
CO2 SERPL-SCNC: 24 MMOL/L (ref 21–32)
CREAT SERPL-MCNC: 2.75 MG/DL (ref 0.6–1.3)
GFR SERPL CREATININE-BSD FRML MDRD: 17 ML/MIN/1.73SQ M
GLUCOSE P FAST SERPL-MCNC: 94 MG/DL (ref 65–99)
POTASSIUM SERPL-SCNC: 4.6 MMOL/L (ref 3.5–5.3)
SODIUM SERPL-SCNC: 142 MMOL/L (ref 135–147)

## 2025-03-17 PROCEDURE — 80048 BASIC METABOLIC PNL TOTAL CA: CPT

## 2025-03-17 PROCEDURE — 36415 COLL VENOUS BLD VENIPUNCTURE: CPT

## 2025-03-17 NOTE — TELEPHONE ENCOUNTER
Patient calling in regards to BMP lab that was completed this morning    Requesting results be reviewed    Please advise patient to discuss reviewed results.

## 2025-03-18 ENCOUNTER — TELEPHONE (OUTPATIENT)
Age: 65
End: 2025-03-18

## 2025-03-18 ENCOUNTER — RESULTS FOLLOW-UP (OUTPATIENT)
Dept: NEPHROLOGY | Facility: CLINIC | Age: 65
End: 2025-03-18

## 2025-03-18 DIAGNOSIS — N25.81 SECONDARY HYPERPARATHYROIDISM OF RENAL ORIGIN (HCC): ICD-10-CM

## 2025-03-18 DIAGNOSIS — I10 ESSENTIAL (PRIMARY) HYPERTENSION: ICD-10-CM

## 2025-03-18 DIAGNOSIS — E55.9 VITAMIN D DEFICIENCY, UNSPECIFIED: Primary | ICD-10-CM

## 2025-03-18 DIAGNOSIS — E87.0 HYPEROSMOLALITY AND HYPERNATREMIA: ICD-10-CM

## 2025-03-18 DIAGNOSIS — N18.4 STAGE 4 CHRONIC KIDNEY DISEASE (HCC): ICD-10-CM

## 2025-03-18 NOTE — TELEPHONE ENCOUNTER
Patient calling in regards to lab results.    States she called yesterday and was following up.    Patient requesting a call when results are reviewed for discussion.

## 2025-03-18 NOTE — TELEPHONE ENCOUNTER
Called patient and went over the following information:    Sodium level stable, potassium level stable, creatinine improved now from 3.25 down to 2.75.  If she otherwise feels well just have her repeat a BMP in 1 week and continue to hold the amiloride.     Patient verbally understood and had no further questions for me at this time.    Labs have been added to the patients chart.

## 2025-03-18 NOTE — TELEPHONE ENCOUNTER
Can you please confirm she got the message after I reviewed her most recent lab work it is attached to her most recent BMP thank you

## 2025-03-18 NOTE — TELEPHONE ENCOUNTER
----- Message from Arturo Mills DO sent at 3/17/2025  1:22 PM EDT -----  Sodium level stable, potassium level stable, creatinine improved now from 3.25 down to 2.75.  If she otherwise feels well just have her repeat a BMP in 1 week and continue to hold the amiloride

## 2025-03-18 NOTE — TELEPHONE ENCOUNTER
Patient called to confirm upcoming appts. Writer confirmed 4/14/25 at 12pm and 4/15/25 at 11am. Patient understood.

## 2025-03-20 ENCOUNTER — APPOINTMENT (OUTPATIENT)
Dept: RADIOLOGY | Facility: CLINIC | Age: 65
End: 2025-03-20
Payer: MEDICARE

## 2025-03-20 ENCOUNTER — OFFICE VISIT (OUTPATIENT)
Dept: OBGYN CLINIC | Facility: CLINIC | Age: 65
End: 2025-03-20
Payer: MEDICARE

## 2025-03-20 VITALS — WEIGHT: 198 LBS | BODY MASS INDEX: 31.08 KG/M2 | HEIGHT: 67 IN

## 2025-03-20 DIAGNOSIS — Z47.1 AFTERCARE FOLLOWING LEFT KNEE JOINT REPLACEMENT SURGERY: Primary | ICD-10-CM

## 2025-03-20 DIAGNOSIS — Z96.652 AFTERCARE FOLLOWING LEFT KNEE JOINT REPLACEMENT SURGERY: ICD-10-CM

## 2025-03-20 DIAGNOSIS — Z96.652 AFTERCARE FOLLOWING LEFT KNEE JOINT REPLACEMENT SURGERY: Primary | ICD-10-CM

## 2025-03-20 DIAGNOSIS — Z47.1 AFTERCARE FOLLOWING LEFT KNEE JOINT REPLACEMENT SURGERY: ICD-10-CM

## 2025-03-20 PROCEDURE — 99213 OFFICE O/P EST LOW 20 MIN: CPT | Performed by: ORTHOPAEDIC SURGERY

## 2025-03-20 PROCEDURE — 73562 X-RAY EXAM OF KNEE 3: CPT

## 2025-03-20 NOTE — PROGRESS NOTES
Assessment:     1. Aftercare following left knee joint replacement surgery        Plan:     Problem List Items Addressed This Visit          Surgery/Wound/Pain    Aftercare following left knee joint replacement surgery - Primary    Patient is 1 year status post left total knee arthroplasty performed on 2/5/2024.  Overall the patient is doing well at this time.  Xrays demonstrate stable alignment of left total knee arthroplasty without evidence of hardware complication. Encouraged patient to continue with her home exercises.  Discussed gym equipment access in the PT facility downstairs for a fee with given her financial status at this time.  Encouraged patient to use exercise bike for knee range of motion.  Explained home exercises will help with her weakness going up the stairs.  Plan to see patient's back in approximately 2 to 3 years for reevaluation.  All patient's questions were answered to Ayleen's satisfaction.  This note is created using dictation transcription.  It may contain typographical errors, grammatical errors, improperly dictated words, background noise and other errors.         Relevant Orders    XR knee 3 vw left non injury      Subjective:     Patient ID: Ayleen Moralez is a 64 y.o. female.  Chief Complaint:  Ayleen is a pleasant 64-year-old female who presents today for 1 year follow-up status post left total knee arthroplasty performed on 2/5/2024.  Overall the patient is doing well at this time.  Prior to her previous visit, patient sustained a fall with a contusion and abrasions to the anterior aspect of the knee with negative xrays for loosening and fracture.  Patient denies pain during today's visit.  She does report mild medial patellar tendon tenderness on exam today.  She reports she is careful with steps as she feels weak going up the steps.  She has to do 26 steps in her home.  Overall she is happy with her outcomes at this time.      Allergy:  Allergies   Allergen Reactions     Molds & Smuts Nasal Congestion    Bee Pollen Nasal Congestion     Other reaction(s): Nasal Congestion    Pollen Extract Nasal Congestion     Medications:  all current active meds have been reviewed  Past Medical History:  Past Medical History:   Diagnosis Date    Aftercare following left knee joint replacement surgery 02/15/2024    Anxiety     Anxiety disorder     Arthritis     At risk for falls     Bipolar 2 disorder (HCC)     Chronic back pain     Chronic kidney disease     Closed fracture of distal end of right fibula with routine healing 2020    COVID-19     in 2021    CVA (cerebral vascular accident) (HCC)     noted on MRI in the past    Depression     GERD (gastroesophageal reflux disease)     Hypercholesteremia     Hypernatremia     Hypertension     Hypokalemia     Idiopathic chronic pancreatitis (HCC) 2018    Intervertebral disc disorder with radiculopathy of lumbosacral region     resolved: 2015    Limb alert care status     LUE-fistula    Panic attacks     Pericardial effusion     PONV (postoperative nausea and vomiting)     Psychiatric problem     Radiculitis     resolved: 2015    Secondary renal hyperparathyroidism (HCC)     Stroke ([facility])     Vitamin D deficiency      Past Surgical History:  Past Surgical History:   Procedure Laterality Date    BUNIONECTOMY      Left foot     CAST APPLICATION Right 2022    Procedure: Application short-arm thumb spica splint;  Surgeon: Lyndon Victoria MD;  Location:  MAIN OR;  Service: Orthopedics    COLON SURGERY      COLONOSCOPY  2018    COLONOSCOPY  2021    DILATION AND CURETTAGE OF UTERUS      INDUCED       surgically induced    MD ARTERIOVENOUS ANASTOMOSIS OPEN DIRECT Left 2019    Procedure: CREATION FISTULA ARTERIOVENOUS (AV) left wrists possible left upper;  Surgeon: Andrey Quintero MD;  Location: [facility];  Service: Vascular    MD ARTHRP KNE CONDYLE&PLATU MEDIAL&LAT COMPARTMENTS Left 2024     Procedure: ARTHROPLASTY KNEE TOTAL SAME DAY;  Surgeon: Riki Ma MD;  Location: [facility];  Service: Orthopedics    WA CORRJ HLX VLGS BNCTY SESMDC DSTL METAR OSTEOT Left 07/01/2019    Procedure: Serge bunionectomy;  Surgeon: Munir Larkin DPM;  Location: [facility];  Service: Podiatry    WA CORRJ HLX VLGS BNCTY SESMDC DSTL METAR OSTEOT Right 08/03/2020    Procedure: BUNIONECTOMY RAFAEL;  Surgeon: Munir Larkin DPM;  Location: [facility];  Service: Podiatry    WA CORRJ HLX VLGS BNCTY SESMDC PROX PHLX OSTEOT Right 09/27/2021    Procedure: BUNIONECTOMY TAMEKA, right tameka osteotomy and 2nd claw toe correction;  Surgeon: James R Lachman, MD;  Location: [facility];  Service: Orthopedics    WA ERCP DX COLLECTION SPECIMEN BRUSHING/WASHING N/A 04/11/2018    Procedure: ENDOSCOPIC RETROGRADE CHOLANGIOPANCREATOGRAPHY (ERCP);  Surgeon: Alfredo Messina MD;  Location: [facility];  Service: Gastroenterology    WA LAPAROSCOPY PROCTOPEXY PROLAPSE N/A 07/13/2018    Procedure: ROBOTIC SIGMOID RESECTION / RECTOPEXY;  Surgeon: ELPIDIO Mcnulty MD;  Location: [facility];  Service: Colorectal    WA OPEN TREATMENT RADIAL SHAFT FRACTURE W/INT FIXJ Right 10/11/2021    Procedure: OPEN REDUCTION W/ INTERNAL FIXATION (ORIF) RADIUS (WRIST), RIGHT DISTAL;  Surgeon: Riki Ma MD;  [facility];  Service: Orthopedics    WA REMOVAL IMPLANT DEEP Right 06/23/2022    Procedure: Removal of hardware volar aspect right distal radius (distal radial plate and screws);  Surgeon: Lyndon Victoria MD;  [facility];  Service: Orthopedics    WA SIGMOIDOSCOPY FLX DX W/COLLJ SPEC BR/WA IF PFRMD N/A 07/13/2018    Procedure: SIGMOIDOSCOPY FLEXIBLE;  Surgeon: ELPIDIO Mcnulty MD;  [facility];  Service: Colorectal    WA TR TDN RESTORE INTRNSC FUNCJ ALL 4 FNGRS Right 06/23/2022    Procedure: Right ring finger flexor digitorum superficialis to flexor pollicis longus tendon transfer;  Surgeon: Lyndon Victoria MD;   Location:  MAIN OR;  Service: Orthopedics    TUBAL LIGATION Bilateral 1997    US GUIDED THYROID BIOPSY  07/30/2019     Family History:  Family History   Problem Relation Age of Onset    Bipolar disorder Mother     Mental illness Mother         depression    Stroke Mother     Dementia Mother     Colon polyps Mother     Heart disease Father     Hypertension Father     Diabetes Father     Mental illness Sister     Colon polyps Sister     Mental illness Sister     Heart disease Sister     No Known Problems Sister     Breast cancer Sister 68    No Known Problems Maternal Grandmother     No Known Problems Maternal Grandfather     Breast cancer Paternal Grandmother         age unknown    No Known Problems Paternal Grandfather     Hypertension Son     Obesity Son     No Known Problems Son     Breast cancer Maternal Aunt         age unknown    Breast cancer Paternal Aunt         age unknown    Breast cancer Paternal Aunt         age unknown    Diabetes Family     Heart disease Family     Hypertension Family     Stroke Family     Thyroid disease Family     Throat cancer Niece 48    Substance Abuse Neg Hx         neg fam hx    Colon cancer Neg Hx      Social History:  Social History     Substance and Sexual Activity   Alcohol Use Not Currently     Social History     Substance and Sexual Activity   Drug Use Not Currently    Types: Hydrocodone, Marijuana    Comment: JESSE Abbasi has h/o marijuana abuse- not currently     Social History     Tobacco Use   Smoking Status Never   Smokeless Tobacco Never     Review of Systems   Constitutional:  Positive for activity change. Negative for chills and fever.   HENT:  Negative for ear pain and sore throat.    Eyes:  Negative for pain and visual disturbance.   Respiratory:  Negative for cough and shortness of breath.    Cardiovascular:  Negative for chest pain and palpitations.   Gastrointestinal:  Negative for abdominal pain and vomiting.   Genitourinary:  Negative for dysuria  "and hematuria.   Musculoskeletal:  Negative for arthralgias and back pain.   Skin:  Negative for color change and rash.   Neurological:  Negative for seizures and syncope.   All other systems reviewed and are negative.        Objective:  BP Readings from Last 1 Encounters:   03/11/25 128/74      Wt Readings from Last 1 Encounters:   03/20/25 89.8 kg (198 lb)      BMI:   Estimated body mass index is 31.01 kg/m² as calculated from the following:    Height as of this encounter: 5' 7\" (1.702 m).    Weight as of this encounter: 89.8 kg (198 lb).  BSA:   Estimated body surface area is 2.01 meters squared as calculated from the following:    Height as of this encounter: 5' 7\" (1.702 m).    Weight as of this encounter: 89.8 kg (198 lb).   Physical Exam  Vitals and nursing note reviewed.   Constitutional:       Appearance: Normal appearance. She is well-developed.   HENT:      Head: Normocephalic and atraumatic.      Right Ear: External ear normal.      Left Ear: External ear normal.      Nose: Nose normal.   Eyes:      Extraocular Movements: Extraocular movements intact.      Conjunctiva/sclera: Conjunctivae normal.   Pulmonary:      Effort: Pulmonary effort is normal.   Musculoskeletal:      Cervical back: Neck supple.      Left knee: No effusion.   Skin:     General: Skin is warm and dry.   Neurological:      Mental Status: She is alert and oriented to person, place, and time.      Deep Tendon Reflexes: Reflexes are normal and symmetric.   Psychiatric:         Mood and Affect: Mood normal.         Behavior: Behavior normal.       Left Knee Exam     Muscle Strength   The patient has normal left knee strength.    Tenderness   The patient is experiencing no tenderness.     Range of Motion   Extension:  0   Flexion:  120     Tests   Varus: negative Valgus: negative  Patellar apprehension: negative    Other   Erythema: absent  Scars: present (Well-healed no signs of infection)  Sensation: normal  Pulse: present  Swelling: " none  Effusion: no effusion present            I have personally reviewed pertinent films in PACS and my interpretation is X-rays of the left knee from 3/20/2025 were reviewed which demonstrates total knee prosthesis intact without evidence of hardware loosening.  No new fracture or dislocation.  I do not have a radiology report.  Good joint alignment.    Scribe Attestation      I,:  Tegan Yan am acting as a scribe while in the presence of the attending physician.:       I,:  Riki Ma MD personally performed the services described in this documentation    as scribed in my presence.:

## 2025-03-20 NOTE — ASSESSMENT & PLAN NOTE
Patient is 1 year status post left total knee arthroplasty performed on 2/5/2024.  Overall the patient is doing well at this time.  Xrays demonstrate stable alignment of left total knee arthroplasty without evidence of hardware complication. Encouraged patient to continue with her home exercises.  Discussed gym equipment access in the PT facility downstairs for a fee with given her financial status at this time.  Encouraged patient to use exercise bike for knee range of motion.  Explained home exercises will help with her weakness going up the stairs.  Plan to see patient's back in approximately 2 to 3 years for reevaluation.  All patient's questions were answered to Ayleen's satisfaction.  This note is created using dictation transcription.  It may contain typographical errors, grammatical errors, improperly dictated words, background noise and other errors.

## 2025-03-22 ENCOUNTER — HOSPITAL ENCOUNTER (EMERGENCY)
Facility: HOSPITAL | Age: 65
Discharge: HOME | End: 2025-03-22
Attending: EMERGENCY MEDICINE
Payer: MEDICARE

## 2025-03-22 ENCOUNTER — APPOINTMENT (EMERGENCY)
Dept: CT IMAGING | Facility: HOSPITAL | Age: 65
End: 2025-03-22
Payer: MEDICARE

## 2025-03-22 ENCOUNTER — APPOINTMENT (EMERGENCY)
Dept: MRI IMAGING | Facility: HOSPITAL | Age: 65
End: 2025-03-22
Payer: MEDICARE

## 2025-03-22 ENCOUNTER — APPOINTMENT (OUTPATIENT)
Dept: RADIOLOGY | Facility: HOSPITAL | Age: 65
End: 2025-03-22
Payer: MEDICARE

## 2025-03-22 VITALS
OXYGEN SATURATION: 93 % | RESPIRATION RATE: 18 BRPM | HEART RATE: 76 BPM | TEMPERATURE: 98.2 F | SYSTOLIC BLOOD PRESSURE: 152 MMHG | DIASTOLIC BLOOD PRESSURE: 78 MMHG

## 2025-03-22 DIAGNOSIS — M26.629 TMJ SYNDROME: ICD-10-CM

## 2025-03-22 DIAGNOSIS — N20.0 KIDNEY STONE: ICD-10-CM

## 2025-03-22 DIAGNOSIS — R51.9 HEADACHE: Primary | ICD-10-CM

## 2025-03-22 DIAGNOSIS — I63.81 LACUNAR INFARCTION (HCC): ICD-10-CM

## 2025-03-22 PROBLEM — R53.1 LEFT-SIDED WEAKNESS: Status: ACTIVE | Noted: 2025-03-22

## 2025-03-22 LAB
2HR DELTA HS TROPONIN: -1 NG/L
ALBUMIN SERPL BCG-MCNC: 4.1 G/DL (ref 3.5–5)
ALP SERPL-CCNC: 108 U/L (ref 34–104)
ALT SERPL W P-5'-P-CCNC: 19 U/L (ref 7–52)
ANION GAP SERPL CALCULATED.3IONS-SCNC: 7 MMOL/L (ref 4–13)
AST SERPL W P-5'-P-CCNC: 22 U/L (ref 13–39)
BASOPHILS # BLD AUTO: 0.08 THOUSANDS/ÂΜL (ref 0–0.1)
BASOPHILS NFR BLD AUTO: 1 % (ref 0–1)
BILIRUB SERPL-MCNC: 0.33 MG/DL (ref 0.2–1)
BILIRUB UR QL STRIP: NEGATIVE
BUN SERPL-MCNC: 28 MG/DL (ref 5–25)
CALCIUM SERPL-MCNC: 8.9 MG/DL (ref 8.4–10.2)
CARDIAC TROPONIN I PNL SERPL HS: 5 NG/L (ref ?–50)
CARDIAC TROPONIN I PNL SERPL HS: 6 NG/L (ref ?–50)
CHLORIDE SERPL-SCNC: 111 MMOL/L (ref 96–108)
CLARITY UR: CLEAR
CO2 SERPL-SCNC: 23 MMOL/L (ref 21–32)
COLOR UR: ABNORMAL
CREAT SERPL-MCNC: 2.48 MG/DL (ref 0.6–1.3)
CRP SERPL QL: 1.3 MG/L
EOSINOPHIL # BLD AUTO: 0.25 THOUSAND/ÂΜL (ref 0–0.61)
EOSINOPHIL NFR BLD AUTO: 4 % (ref 0–6)
ERYTHROCYTE [DISTWIDTH] IN BLOOD BY AUTOMATED COUNT: 12.6 % (ref 11.6–15.1)
ERYTHROCYTE [SEDIMENTATION RATE] IN BLOOD: 26 MM/HOUR (ref 0–29)
FLUAV AG UPPER RESP QL IA.RAPID: NEGATIVE
FLUBV AG UPPER RESP QL IA.RAPID: NEGATIVE
GFR SERPL CREATININE-BSD FRML MDRD: 20 ML/MIN/1.73SQ M
GLUCOSE SERPL-MCNC: 95 MG/DL (ref 65–140)
GLUCOSE UR STRIP-MCNC: NEGATIVE MG/DL
HCT VFR BLD AUTO: 38.2 % (ref 34.8–46.1)
HGB BLD-MCNC: 11.7 G/DL (ref 11.5–15.4)
HGB UR QL STRIP.AUTO: NEGATIVE
IMM GRANULOCYTES # BLD AUTO: 0.02 THOUSAND/UL (ref 0–0.2)
IMM GRANULOCYTES NFR BLD AUTO: 0 % (ref 0–2)
KETONES UR STRIP-MCNC: NEGATIVE MG/DL
LEUKOCYTE ESTERASE UR QL STRIP: NEGATIVE
LYMPHOCYTES # BLD AUTO: 1.38 THOUSANDS/ÂΜL (ref 0.6–4.47)
LYMPHOCYTES NFR BLD AUTO: 23 % (ref 14–44)
MCH RBC QN AUTO: 32.3 PG (ref 26.8–34.3)
MCHC RBC AUTO-ENTMCNC: 30.6 G/DL (ref 31.4–37.4)
MCV RBC AUTO: 106 FL (ref 82–98)
MONOCYTES # BLD AUTO: 0.5 THOUSAND/ÂΜL (ref 0.17–1.22)
MONOCYTES NFR BLD AUTO: 9 % (ref 4–12)
NEUTROPHILS # BLD AUTO: 3.66 THOUSANDS/ÂΜL (ref 1.85–7.62)
NEUTS SEG NFR BLD AUTO: 63 % (ref 43–75)
NITRITE UR QL STRIP: NEGATIVE
NRBC BLD AUTO-RTO: 0 /100 WBCS
PH UR STRIP.AUTO: 6 [PH]
PLATELET # BLD AUTO: 207 THOUSANDS/UL (ref 149–390)
PMV BLD AUTO: 10.3 FL (ref 8.9–12.7)
POTASSIUM SERPL-SCNC: 4.9 MMOL/L (ref 3.5–5.3)
PROT SERPL-MCNC: 6.8 G/DL (ref 6.4–8.4)
PROT UR STRIP-MCNC: NEGATIVE MG/DL
RBC # BLD AUTO: 3.62 MILLION/UL (ref 3.81–5.12)
S PYO DNA THROAT QL NAA+PROBE: NOT DETECTED
SARS-COV+SARS-COV-2 AG RESP QL IA.RAPID: NEGATIVE
SODIUM SERPL-SCNC: 141 MMOL/L (ref 135–147)
SP GR UR STRIP.AUTO: <1.005 (ref 1–1.03)
UROBILINOGEN UR STRIP-ACNC: <2 MG/DL
WBC # BLD AUTO: 5.89 THOUSAND/UL (ref 4.31–10.16)

## 2025-03-22 PROCEDURE — 87651 STREP A DNA AMP PROBE: CPT

## 2025-03-22 PROCEDURE — 74176 CT ABD & PELVIS W/O CONTRAST: CPT

## 2025-03-22 PROCEDURE — 96375 TX/PRO/DX INJ NEW DRUG ADDON: CPT

## 2025-03-22 PROCEDURE — 81003 URINALYSIS AUTO W/O SCOPE: CPT

## 2025-03-22 PROCEDURE — 99285 EMERGENCY DEPT VISIT HI MDM: CPT

## 2025-03-22 PROCEDURE — 72125 CT NECK SPINE W/O DYE: CPT

## 2025-03-22 PROCEDURE — 71046 X-RAY EXAM CHEST 2 VIEWS: CPT

## 2025-03-22 PROCEDURE — 36415 COLL VENOUS BLD VENIPUNCTURE: CPT

## 2025-03-22 PROCEDURE — 85652 RBC SED RATE AUTOMATED: CPT

## 2025-03-22 PROCEDURE — 87804 INFLUENZA ASSAY W/OPTIC: CPT

## 2025-03-22 PROCEDURE — 99284 EMERGENCY DEPT VISIT MOD MDM: CPT

## 2025-03-22 PROCEDURE — 99285 EMERGENCY DEPT VISIT HI MDM: CPT | Performed by: PSYCHIATRY & NEUROLOGY

## 2025-03-22 PROCEDURE — 87811 SARS-COV-2 COVID19 W/OPTIC: CPT

## 2025-03-22 PROCEDURE — 85025 COMPLETE CBC W/AUTO DIFF WBC: CPT

## 2025-03-22 PROCEDURE — 96361 HYDRATE IV INFUSION ADD-ON: CPT

## 2025-03-22 PROCEDURE — 86140 C-REACTIVE PROTEIN: CPT

## 2025-03-22 PROCEDURE — 84484 ASSAY OF TROPONIN QUANT: CPT

## 2025-03-22 PROCEDURE — 70547 MR ANGIOGRAPHY NECK W/O DYE: CPT

## 2025-03-22 PROCEDURE — 71250 CT THORAX DX C-: CPT

## 2025-03-22 PROCEDURE — 93005 ELECTROCARDIOGRAM TRACING: CPT

## 2025-03-22 PROCEDURE — 96374 THER/PROPH/DIAG INJ IV PUSH: CPT

## 2025-03-22 PROCEDURE — 70450 CT HEAD/BRAIN W/O DYE: CPT

## 2025-03-22 PROCEDURE — 80053 COMPREHEN METABOLIC PANEL: CPT

## 2025-03-22 PROCEDURE — 70544 MR ANGIOGRAPHY HEAD W/O DYE: CPT

## 2025-03-22 PROCEDURE — 70551 MRI BRAIN STEM W/O DYE: CPT

## 2025-03-22 RX ORDER — ACETAMINOPHEN 10 MG/ML
1000 INJECTION, SOLUTION INTRAVENOUS ONCE
Status: COMPLETED | OUTPATIENT
Start: 2025-03-22 | End: 2025-03-22

## 2025-03-22 RX ORDER — METOCLOPRAMIDE HYDROCHLORIDE 5 MG/ML
10 INJECTION INTRAMUSCULAR; INTRAVENOUS ONCE
Status: COMPLETED | OUTPATIENT
Start: 2025-03-22 | End: 2025-03-22

## 2025-03-22 RX ORDER — CYCLOBENZAPRINE HCL 10 MG
10 TABLET ORAL
Qty: 20 TABLET | Refills: 0 | Status: SHIPPED | OUTPATIENT
Start: 2025-03-22

## 2025-03-22 RX ADMIN — METOCLOPRAMIDE 10 MG: 5 INJECTION, SOLUTION INTRAMUSCULAR; INTRAVENOUS at 13:17

## 2025-03-22 RX ADMIN — SODIUM CHLORIDE 500 ML: 0.9 INJECTION, SOLUTION INTRAVENOUS at 13:16

## 2025-03-22 RX ADMIN — ACETAMINOPHEN 1000 MG: 10 INJECTION INTRAVENOUS at 13:17

## 2025-03-22 NOTE — ED PROVIDER NOTES
"Time reflects when diagnosis was documented in both MDM as applicable and the Disposition within this note       Time User Action Codes Description Comment    3/22/2025 12:47 PM Basilio Aileen M Add [R51.9] Headache     3/22/2025  4:58 PM Maria Esthermary joAileen KATINA Add [M26.629] TMJ syndrome     3/22/2025  4:58 PM Aileen Richmond Add [N20.0] Kidney stone     3/22/2025  5:02 PM Basilio Aileen M Add [I63.81] Lacunar infarction (HCC)           ED Disposition       ED Disposition   Discharge    Condition   Stable    Date/Time   Sat Mar 22, 2025  4:58 PM    Comment   Ayleen Moralez discharge to home/self care.                   Assessment & Plan       Medical Decision Making  DDx: TMJ, electrolyte abnormality, CVA, dysrhythmia, ACS  Patient arriving with a constellation of symptoms. Due to risk factors and reporting \"syncope,\" with due to pain will check cardiac work up, and start with CT head, c/a/p. Considered giant cell arteritis given location of pain. Sed rate and CRP are negative today.  Due jaw pain and risk factors will r/o acs. Did consider CVA however outside window for thrombolytic therapy.   Blood work without acute abnormality. Kidney function at baseline. Heart score of 4. Discussed chest pain has been present for months, recommend follow up with cardiology.   Case discussed with Dr. Olivas. Recommendation for MRA head and neck to r/o dissection/aneurysm. Case discussed with Dr. Nuñez of radiology and in agreement with MRA and MRI. Patient has improvement to headache through out evaluation. Patient evaluated with Dr. Olivas following MRA/MRI. Findings were discussed by her during evaluation. Plan to have patient follow up with ENT for TMJ, and neurology for chronic lunar sight noted. Patient reports comfortable with discharge.   Reviewed reasons to return to ed.  Patient verbalized understanding of diagnosis and agreement with discharge plan of care as well as understanding of reasons to " return to ed.      Amount and/or Complexity of Data Reviewed  Labs: ordered. Decision-making details documented in ED Course.  Radiology: ordered.    Risk  Prescription drug management.        ED Course as of 03/23/25 2321   Sat Mar 22, 2025   1214 Sed Rate: 26   1657 Patient was evaluated by neurology with recommendation for outpatient follow up.        Medications   metoclopramide (REGLAN) injection 10 mg (10 mg Intravenous Given 3/22/25 1317)   acetaminophen (Ofirmev) injection 1,000 mg (0 mg Intravenous Stopped 3/22/25 1332)   sodium chloride 0.9 % bolus 500 mL (0 mL Intravenous Stopped 3/22/25 1416)       ED Risk Strat Scores   HEART Risk Score      Flowsheet Row Most Recent Value   Heart Score Risk Calculator    History 0 Filed at: 03/22/2025 1500   ECG 1 Filed at: 03/22/2025 1500   Age 1 Filed at: 03/22/2025 1500   Risk Factors 2 Filed at: 03/22/2025 1500   Troponin 0 Filed at: 03/22/2025 1500   HEART Score 4 Filed at: 03/22/2025 1500          HEART Risk Score      Flowsheet Row Most Recent Value   Heart Score Risk Calculator    History 0 Filed at: 03/22/2025 1500   ECG 1 Filed at: 03/22/2025 1500   Age 1 Filed at: 03/22/2025 1500   Risk Factors 2 Filed at: 03/22/2025 1500   Troponin 0 Filed at: 03/22/2025 1500   HEART Score 4 Filed at: 03/22/2025 1500                              SBIRT 20yo+      Flowsheet Row Most Recent Value   Initial Alcohol Screen: US AUDIT-C     1. How often do you have a drink containing alcohol? 0 Filed at: 03/22/2025 1222   2. How many drinks containing alcohol do you have on a typical day you are drinking?  0 Filed at: 03/22/2025 1222   3a. Male UNDER 65: How often do you have five or more drinks on one occasion? 0 Filed at: 03/22/2025 1222   3b. FEMALE Any Age, or MALE 65+: How often do you have 4 or more drinks on one occassion? 0 Filed at: 03/22/2025 1222   Audit-C Score 0 Filed at: 03/22/2025 1222   ZAYRA: How many times in the past year have you...    Used an illegal drug or  used a prescription medication for non-medical reasons? Never Filed at: 2025 1222                            History of Present Illness       Chief Complaint   Patient presents with    Jaw Pain     Pt reports left jaw pain since yesterday, it feels like a shocking sensation with a sore throat and hurts more when she opens and closes her mouth       Past Medical History:   Diagnosis Date    Aftercare following left knee joint replacement surgery 02/15/2024    Anxiety     Anxiety disorder     Arthritis     At risk for falls     Bipolar 2 disorder (HCC)     Chronic back pain     Chronic kidney disease     Closed fracture of distal end of right fibula with routine healing 2020    COVID-19     in 2021    CVA (cerebral vascular accident) (HCC)     noted on MRI in the past    Depression     GERD (gastroesophageal reflux disease)     Hypercholesteremia     Hypernatremia     Hypertension     Hypokalemia     Idiopathic chronic pancreatitis (HCC) 2018    Intervertebral disc disorder with radiculopathy of lumbosacral region     resolved: 2015    Limb alert care status     LUE-fistula    Panic attacks     Pericardial effusion     PONV (postoperative nausea and vomiting)     Psychiatric problem     Radiculitis     resolved: 2015    Secondary renal hyperparathyroidism (HCC)     Stroke (HCC)     Vitamin D deficiency       Past Surgical History:   Procedure Laterality Date    BUNIONECTOMY      Left foot     CAST APPLICATION Right 2022    Procedure: Application short-arm thumb spica splint;  Surgeon: Lyndon Victoria MD;  Location:  MAIN OR;  Service: Orthopedics    COLON SURGERY      COLONOSCOPY  2018    COLONOSCOPY  2021    DILATION AND CURETTAGE OF UTERUS      INDUCED       surgically induced    GA ARTERIOVENOUS ANASTOMOSIS OPEN DIRECT Left 2019    Procedure: CREATION FISTULA ARTERIOVENOUS (AV) left wrists possible left upper;  Surgeon: Andrey Quintero MD;   Location: [facility];  Service: Vascular    OR ARTHRP KNE CONDYLE&PLATU MEDIAL&LAT COMPARTMENTS Left 02/05/2024    Procedure: ARTHROPLASTY KNEE TOTAL SAME DAY;  Surgeon: Riki Ma MD;  [facility]: [facility];  Service: Orthopedics    [facility] MET[facility] O[facility] Left 07/01/2019    Procedure: Serge bunionectomy;  Surgeon: Munir Larkin DPM;  [facility];  Service: Podiatry    OR COR[facility]J HL[facility] [place] [facility] MET[facility] OSTEOT Right 08/03/2020    Procedure: BUNIONECTOMY RAFAEL;  Surgeon: Munir Larkin DPM;  [facility];  Service: Podiatry    OR CORRJ HLX VLGS BNCTY [facility] PROX PHLX OSTEOT Right 09/27/2021    Procedure: BUNIONECTOMY TAMEKA, right tameka osteotomy and 2nd claw toe correction;  Surgeon: James R Lachman, MD;  [facility];  Service: Orthopedics    OR ERCP DX COLLECTION SPECIMEN BRUSHING/WASHING N/A 04/11/2018    Procedure: ENDOSCOPIC RETROGRADE CHOLANGIOPANCREATOGRAPHY (ERCP);  Surgeon: Alfredo Messina MD;  [facility];  Service: Gastroenterology    OR LAPAROSCOPY PROCTOPEXY PROLAPSE N/A 07/13/2018    Procedure: ROBOTIC SIGMOID RESECTION / RECTOPEXY;  Surgeon: ELPIDIO Mcnulty MD;  [facility]: [facility];  Service: Colorectal    OR OPEN TREATMENT RADIAL SHAFT FRACTURE W/INT FIXJ Right 10/11/2021    Procedure: OPEN REDUCTION W/ INTERNAL FIXATION (ORIF) RADIUS (WRIST), RIGHT DISTAL;  Surgeon: Riki Ma MD;  [facility];  Service: Orthopedics    OR REMOVAL IMPLANT DEEP Right 06/23/2022    Procedure: Removal of hardware volar aspect right distal radius (distal radial plate and screws);  Surgeon: Lyndon Victoria MD;  [facility];  Service: Orthopedics    OR SIGMOIDOSCOPY FLX DX W/COLLJ SPEC BR/WA IF PFRMD N/A 07/13/2018    Procedure: SIGMOIDOSCOPY FLEXIBLE;  Surgeon: ELPIDIO Mcnulty MD;  [facility];  Service: Colorectal    OR TR TDN RESTORE INTRNSC FUNCJ ALL 4 FNGRS Right 06/23/2022    Procedure: Right ring finger  flexor digitorum superficialis to flexor pollicis longus tendon transfer;  Surgeon: Lyndon Victoria MD;  [facility];  Service: Orthopedics    TUBAL LIGATION Bilateral 1997    US GUIDED THYROID BIOPSY  07/30/2019      Family History   Problem Relation Age of Onset    Bipolar disorder Mother     Mental illness Mother         depression    Stroke Mother     Dementia Mother     Colon polyps Mother     Heart disease Father     Hypertension Father     Diabetes Father     Mental illness Sister     Colon polyps Sister     Mental illness Sister     Heart disease Sister     No Known Problems Sister     Breast cancer Sister 68    No Known Problems Maternal Grandmother     No Known Problems Maternal Grandfather     Breast cancer Paternal Grandmother         age unknown    No Known Problems Paternal Grandfather     Hypertension Son     Obesity Son     No Known Problems Son     Breast cancer Maternal Aunt         age unknown    Breast cancer Paternal Aunt         age unknown    Breast cancer Paternal Aunt         age unknown    Diabetes Family     Heart disease Family     Hypertension Family     Stroke Family     Thyroid disease Family     Throat cancer Niece 48    Substance Abuse Neg Hx         neg fam hx    Colon cancer Neg Hx       Social History     Tobacco Use    Smoking status: Never    Smokeless tobacco: Never   Vaping Use    Vaping status: Never Used   Substance Use Topics    Alcohol use: Not Currently    Drug use: Not Currently     Types: Hydrocodone, Marijuana     Comment: JESSE Abbasi has h/o marijuana abuse- not currently      E-Cigarette/Vaping    E-Cigarette Use Never User       E-Cigarette/Vaping Substances    Nicotine No     THC No     CBD No     Flavoring No     Other No     Unknown No       I have reviewed and agree with the history as documented.     Patient is a 65 yo F pmhx of htn, high cholesterol arriving for evaluation of left sided neck pain/jaw pain that radiates up to the left side  "of her head and down into the left abdomen. Patient states started yesterday at 1600. Reports some ear pain. Patient states, \"Started with a sore throat.\" Patient states neck pain on left lateral aspect. Patient denies fever.  No change to phonation, difficulty swallowing. Patient reports chest pain and shortness of breath \"for a long time.\" Patient reports headache, and blurred vision. Patient reports numbness and tingling of left leg, with associated weakness. STates only has weakness of left upper extremity since, denies numbness or tingling. Patient has no obvious deficit in strength on exam. Patient states pain radiates from neck up to side of left head and down to LLQ. Reports at 1600 she had a sudden \"shock\" of pain and then passed out, stating \"my eyes went all black.\" Patient denies recent illness.         Review of Systems   Constitutional: Negative.    HENT: Negative.     Eyes: Negative.    Respiratory: Negative.     Cardiovascular:  Positive for chest pain.   Gastrointestinal: Negative.    Endocrine: Negative.    Genitourinary: Negative.    Musculoskeletal: Negative.    Skin: Negative.    Allergic/Immunologic: Negative.    Neurological:  Positive for headaches. Negative for seizures and speech difficulty.   Hematological: Negative.    Psychiatric/Behavioral: Negative.     All other systems reviewed and are negative.          Objective       ED Triage Vitals   Temperature Pulse Blood Pressure Respirations SpO2 Patient Position - Orthostatic VS   03/22/25 1101 03/22/25 1101 03/22/25 1101 03/22/25 1101 03/22/25 1101 03/22/25 1222   98.2 °F (36.8 °C) 78 150/74 18 98 % Sitting      Temp src Heart Rate Source BP Location FiO2 (%) Pain Score    -- 03/22/25 1400 03/22/25 1222 -- --     Monitor Right arm        Vitals      Date and Time Temp Pulse SpO2 Resp BP Pain Score FACES Pain Rating User   03/22/25 1650 -- 76 93 % 18 152/78 -- -- KP   03/22/25 1400 -- 70 94 % 20 150/70 -- --    03/22/25 1222 -- 76 94 % 16 " 143/76 -- --    03/22/25 1101 98.2 °F (36.8 °C) 78 98 % 18 150/74 -- -- PEACE            Physical Exam  Vitals and nursing note reviewed.   Constitutional:       Appearance: Normal appearance. She is normal weight.   HENT:      Head: Normocephalic.        Right Ear: External ear normal.      Left Ear: External ear normal.      Nose: Nose normal. No congestion or rhinorrhea.      Mouth/Throat:      Mouth: Mucous membranes are moist.      Pharynx: Oropharynx is clear.   Eyes:      Extraocular Movements: Extraocular movements intact.      Conjunctiva/sclera: Conjunctivae normal.      Pupils: Pupils are equal, round, and reactive to light.   Cardiovascular:      Rate and Rhythm: Normal rate and regular rhythm.      Pulses: Normal pulses.      Heart sounds: Normal heart sounds.   Pulmonary:      Effort: Pulmonary effort is normal. No respiratory distress.      Breath sounds: Normal breath sounds. No wheezing or rales.   Abdominal:      General: Abdomen is flat. Bowel sounds are normal. There is no distension.      Palpations: Abdomen is soft.      Tenderness: There is no abdominal tenderness. There is no right CVA tenderness or guarding.   Musculoskeletal:         General: Normal range of motion.      Cervical back: Normal range of motion and neck supple.   Skin:     General: Skin is warm.      Capillary Refill: Capillary refill takes less than 2 seconds.   Neurological:      General: No focal deficit present.      Mental Status: She is alert and oriented to person, place, and time. Mental status is at baseline.      GCS: GCS eye subscore is 4. GCS verbal subscore is 5. GCS motor subscore is 6.      Cranial Nerves: Cranial nerves 2-12 are intact.      Comments: 5/5 upper extremity strength, no numbness or tingling   5/5 right lower extremity strength, no numbness or tingling  Reports left lower tingling 5/5 strength.    Psychiatric:         Mood and Affect: Mood normal.         Behavior: Behavior normal.         Thought  Content: Thought content normal.         Judgment: Judgment normal.         Results Reviewed       Procedure Component Value Units Date/Time    UA w Reflex to Microscopic w Reflex to Culture [[id number]]  (Abnormal) Collected: 03/22/25 1706    Lab Status: Final result Specimen: Urine, Clean Catch Updated: 03/22/25 1724     Color, UA Light Yellow     Clarity, UA Clear     Specific Gravity, UA <1.005     pH, UA 6.0     Leukocytes, UA Negative     Nitrite, UA Negative     Protein, UA Negative mg/dl      Glucose, UA Negative mg/dl      Ketones, UA Negative mg/dl      Urobilinogen, UA <2.0 mg/dl      Bilirubin, UA Negative     Occult Blood, UA Negative    Strep A PCR [[id number]]  (Normal) Collected: 03/22/25 1251    Lab Status: Final result Specimen: Throat Updated: 03/22/25 1336     STREP A PCR Not Detected    HS Troponin I 2hr [[id number]]  (Normal) Collected: 03/22/25 1256    Lab Status: Final result Specimen: Blood from Hand, Right Updated: 03/22/25 1327     hs TnI 2hr 5 ng/L      Delta 2hr hsTnI -1 ng/L     FLU/COVID Rapid Antigen (30 min. TAT) - Preferred screening test in ED [[id number]]  (Normal) Collected: 03/22/25 1251    Lab Status: Final result Specimen: Nares from Nose Updated: 03/22/25 1327     SARS COV Rapid Antigen Negative     Influenza A Rapid Antigen Negative     Influenza B Rapid Antigen Negative    Narrative:      This test has been performed using the [employer] Martha 2 FLU+SARS Antigen test under the Emergency Use Authorization (EUA). This test has been validated by the  and verified by the performing laboratory. The Martha uses lateral flow immunofluorescent sandwich assay to detect SARS-COV, Influenza A and Influenza B Antigen.     The [employer] Martha 2 SARS Antigen test does not differentiate between SARS-CoV and SARS-CoV-2.     Negative results are presumptive and may be confirmed with a molecular assay, if necessary, for patient management. Negative results do not rule out SARS-CoV-2 or  influenza infection and should not be used as the sole basis for treatment or patient management decisions. A negative test result may occur if the level of antigen in a sample is below the limit of detection of this test.     Positive results are indicative of the presence of viral antigens, but do not rule out bacterial infection or co-infection with other viruses.     All test results should be used as an adjunct to clinical observations and other information available to the provider.    FOR PEDIATRIC PATIENTS - copy/paste COVID Guidelines URL to browser: [email or web address]    C-reactive protein [[id number]]  (Normal) Collected: 03/22/25 1200    Lab Status: Final result Specimen: Blood from Line Updated: 03/22/25 1220     CRP 1.3 mg/L     Sedimentation rate, automated [[id number]]  (Normal) Collected: 03/22/25 1200    Lab Status: Final result Specimen: Blood from Line Updated: 03/22/25 1213     Sed Rate 26 mm/hour     HS Troponin 0hr (reflex protocol) [[id number]]  (Normal) Collected: 03/22/25 1110    Lab Status: Final result Specimen: Blood from Arm, Right Updated: 03/22/25 1141     hs TnI 0hr 6 ng/L     Comprehensive metabolic panel [[id number]]  (Abnormal) Collected: 03/22/25 1110    Lab Status: Final result Specimen: Blood from Arm, Right Updated: 03/22/25 1134     Sodium 141 mmol/L      Potassium 4.9 mmol/L      Chloride 111 mmol/L      CO2 23 mmol/L      ANION GAP 7 mmol/L      BUN 28 mg/dL      Creatinine 2.48 mg/dL      Glucose 95 mg/dL      Calcium 8.9 mg/dL      AST 22 U/L      ALT 19 U/L      Alkaline Phosphatase 108 U/L      Total Protein 6.8 g/dL      Albumin 4.1 g/dL      Total Bilirubin 0.33 mg/dL      eGFR 20 ml/min/1.73sq m     Narrative:      National Kidney Disease Foundation guidelines for Chronic Kidney Disease (CKD):     Stage 1 with normal or high GFR (GFR > 90 mL/min/1.73 square meters)    Stage 2 Mild CKD (GFR = 60-89 mL/min/1.73 square  meters)    Stage 3A Moderate CKD (GFR = 45-59 mL/min/1.73 square meters)    Stage 3B Moderate CKD (GFR = 30-44 mL/min/1.73 square meters)    Stage 4 Severe CKD (GFR = 15-29 mL/min/1.73 square meters)    Stage 5 End Stage CKD (GFR <15 mL/min/1.73 square meters)  Note: GFR calculation is accurate only with a steady state creatinine    CBC and differential [[id number]]  (Abnormal) Collected: 03/22/25 1110    Lab Status: Final result Specimen: Blood from Arm, Right Updated: 03/22/25 1118     WBC 5.89 Thousand/uL      RBC 3.62 Million/uL      Hemoglobin 11.7 g/dL      Hematocrit 38.2 %       fL      MCH 32.3 pg      MCHC 30.6 g/dL      RDW 12.6 %      MPV 10.3 fL      Platelets 207 Thousands/uL      nRBC 0 /100 WBCs      Segmented % 63 %      Immature Grans % 0 %      Lymphocytes % 23 %      Monocytes % 9 %      Eosinophils Relative 4 %      Basophils Relative 1 %      Absolute Neutrophils 3.66 Thousands/µL      Absolute Immature Grans 0.02 Thousand/uL      Absolute Lymphocytes 1.38 Thousands/µL      Absolute Monocytes 0.50 Thousand/µL      Eosinophils Absolute 0.25 Thousand/µL      Basophils Absolute 0.08 Thousands/µL             MRA head wo contrast   Final Interpretation by E. Alec Schoenberger, MD (03/22 1553)      Somewhat limited by motion. No large vessel occlusion. No definite high-grade intracranial stenosis.   No aneurysm seen but evaluation for small aneurysms is limited.               Workstation performed: [id number]         MRI brain wo contrast   Final Interpretation by E. Alec Schoenberger, MD (03/22 1554)      No acute intracranial pathology.   Stable chronic microangiopathy and chronic left basal ganglia lacunar infarct.      Workstation performed: [id number]         MRA carotids wo contrast   Final Interpretation by E. Alec Schoenberger, MD (03/22 1553)   No significant stenosis.            Workstation performed: [id number]         CT head without contrast   Final Interpretation by Maximo Sigala,  MD (03/22 1309)      No acute intracranial abnormality.  Chronic microangiopathic changes.                  Workstation performed: [id number]         CT spine cervical without contrast   Final Interpretation by Maximo Sigala MD (03/22 1312)      No cervical spine fracture or traumatic malalignment.                  Workstation performed: [id number]         CT chest abdomen pelvis wo contrast   Final Interpretation by Maximo Sigala MD (03/22 1317)      1.  No acute findings throughout the chest, abdomen or pelvis.   2.  Punctate right lower renal pole nonobstructive calculi without additional urinary calculi.               Workstation performed: [id number]         XR chest 2 views   Final Interpretation by Bharti Lugo MD (03/22 2149)      No acute cardiopulmonary disease.            Workstation performed: [id number]             ECG 12 Lead Documentation Only    Date/Time: 3/22/2025 11:29 AM    Performed by: EMERITA Olson  Authorized by: EMERITA Olson    Indications / Diagnosis:  Jaw pain  Patient location:  ED  Rate:     ECG rate:  75    ECG rate assessment: normal    Rhythm:     Rhythm: sinus rhythm    Ectopy:     Ectopy: none    QRS:     QRS axis:  Normal  Conduction:     Conduction: abnormal      Abnormal conduction: complete RBBB    ST segments:     ST segments:  Normal  T waves:     T waves: normal        ED Medication and Procedure Management   Prior to Admission Medications   Prescriptions Last Dose Informant Patient Reported? Taking?   AMILoride 5 mg tablet   No No   Sig: Take 1 tablet (5 mg total) by mouth daily   Cyanocobalamin (VITAMIN B 12 PO)  Self Yes No   Sig: Take 1,000 mcg by mouth in the morning   Multiple Vitamin (MULTI-VITAMIN) tablet  Self No No   Sig: Take 1 tablet by mouth daily   QUEtiapine (SEROquel) 100 mg tablet  Self No No   Sig: Take 1 tablet (100 mg total) by mouth in the morning   QUEtiapine (SEROquel) 400 MG tablet  Self No No   Sig: Take 1 tablet (400 mg  total) by mouth daily at bedtime Take with the Seroquel 100 mg to total 500 mg daily at bedtime   acetaminophen (TYLENOL) 500 mg tablet  Self No No   Sig: Take 2 tablets (1,000 mg total) by mouth every 8 (eight) hours   albuterol (Ventolin HFA) 90 mcg/act inhaler  Self No No   Sig: Inhale 2 puffs every 6 (six) hours as needed for wheezing or shortness of breath   amLODIPine (NORVASC) 5 mg tablet   No No   Sig: TAKE 1 TABLET BY MOUTH DAILY   ascorbic acid (VITAMIN C) 500 MG tablet  Self No No   Sig: Take 1 tablet (500 mg total) by mouth 2 (two) times a day   aspirin 81 mg chewable tablet   No No   Sig: Chew 1 tablet (81 mg total) daily   budesonide-formoterol (Symbicort) 160-4.5 mcg/act inhaler  Self No No   Sig: Inhale 2 puffs 2 (two) times a day Rinse mouth after use.   calcium carbonate (Calcium 600) 600 MG tablet  Self Yes No   Sig: Take 600 mg by mouth 2 (two) times a day with meals   carvedilol (COREG) 25 mg tablet   No No   Sig: TAKE 1 TABLET BY MOUTH TWICE  DAILY WITH MEALS   cholecalciferol (VITAMIN D3) 1,000 units tablet  Self No No   Sig: Take 5 tablets (5,000 Units total) by mouth daily   clonazePAM (KlonoPIN) 0.5 mg tablet  Self No No   Sig: Take 1 tablet (0.5 mg total) by mouth 3 (three) times a day Do not start before 2025.   denosumab (XGEVA) 120 mg/1.7 mL   Yes No   Sig: Inject 120 mg under the skin   dextromethorphan-guaifenesin (MUCINEX DM)  MG per 12 hr tablet   No No   Sig: Take 1 tablet by mouth every 12 (twelve) hours   Patient not taking: Reported on 3/11/2025   dicyclomine (BENTYL) 10 mg capsule   No No   Sig: Take 1 capsule (10 mg total) by mouth 4 (four) times a day as needed (abdominal cramping)   ferrous sulfate 324 (65 Fe) mg  Self No No   Sig: Take 1 tablet (324 mg total) by mouth 2 (two) times a day before meals   fluticasone (FLONASE) 50 mcg/act nasal spray   No No   Si sprays into each nostril daily as needed for rhinitis (congestion)   Patient not taking:  Reported on 3/11/2025   fluvoxaMINE (LUVOX) 100 mg tablet  Self No No   Sig: Fluvoxamine 100m tablet in the morning ; 2 tablets at night   lamoTRIgine (LaMICtal) 200 MG tablet  Self No No   Sig: Lamotrigine 200m tablet in the morning , 1 tablet at night   montelukast (SINGULAIR) 10 mg tablet  Self No No   Sig: Take 1 tablet (10 mg total) by mouth daily at bedtime   omeprazole (PriLOSEC) 40 MG capsule  Self No No   Sig: TAKE 1 CAPSULE BY MOUTH DAILY  BEFORE BREAKFAST   Patient taking differently: Take 20 mg by mouth daily before breakfast   ondansetron (ZOFRAN) 4 mg tablet   No No   Sig: Take 1 tablet (4 mg total) by mouth every 8 (eight) hours as needed for nausea or vomiting   rosuvastatin (CRESTOR) 10 MG tablet   No No   Sig: Take 1 tablet (10 mg total) by mouth every evening   Patient taking differently: Take 5 mg by mouth every evening   traMADol (ULTRAM) 50 mg tablet   No No   Sig: Take 1 tablet (50 mg total) by mouth 2 (two) times a day as needed for moderate pain      Facility-Administered Medications: None     Discharge Medication List as of 3/22/2025  5:24 PM        START taking these medications    Details   cyclobenzaprine (FLEXERIL) 10 mg tablet Take 1 tablet (10 mg total) by mouth daily at bedtime, Starting Sat 3/22/2025, Normal           CONTINUE these medications which have NOT CHANGED    Details   acetaminophen (TYLENOL) 500 mg tablet Take 2 tablets (1,000 mg total) by mouth every 8 (eight) hours, Starting Mon 2024, Normal      albuterol (Ventolin HFA) 90 mcg/act inhaler Inhale 2 puffs every 6 (six) hours as needed for wheezing or shortness of breath, Starting Thu 2023, Normal      AMILoride 5 mg tablet Take 1 tablet (5 mg total) by mouth daily, Starting Fri 2025, Normal      amLODIPine (NORVASC) 5 mg tablet TAKE 1 TABLET BY MOUTH DAILY, Starting Tue 2025, Normal      ascorbic acid (VITAMIN C) 500 MG tablet Take 1 tablet (500 mg total) by mouth 2 (two) times a day, Starting  2024, Normal      aspirin 81 mg chewable tablet Chew 1 tablet (81 mg total) daily, Starting Tue 3/11/2025, Normal      budesonide-formoterol (Symbicort) 160-4.5 mcg/act inhaler Inhale 2 puffs 2 (two) times a day Rinse mouth after use., Starting Thu 8/10/2023, Normal      calcium carbonate (Calcium 600) 600 MG tablet Take 600 mg by mouth 2 (two) times a day with meals, Historical Med      carvedilol (COREG) 25 mg tablet TAKE 1 TABLET BY MOUTH TWICE  DAILY WITH MEALS, Starting Thu 3/13/2025, Normal      cholecalciferol (VITAMIN D3) 1,000 units tablet Take 5 tablets (5,000 Units total) by mouth daily, Starting 2022, Normal      clonazePAM (KlonoPIN) 0.5 mg tablet Take 1 tablet (0.5 mg total) by mouth 3 (three) times a day Do not start before 2025., Starting 2025, Normal      Cyanocobalamin (VITAMIN B 12 PO) Take 1,000 mcg by mouth in the morning, Historical Med      denosumab (XGEVA) 120 mg/1.7 mL Inject 120 mg under the skin, Historical Med      dextromethorphan-guaifenesin (MUCINEX DM)  MG per 12 hr tablet Take 1 tablet by mouth every 12 (twelve) hours, Starting 2025, Normal      dicyclomine (BENTYL) 10 mg capsule Take 1 capsule (10 mg total) by mouth 4 (four) times a day as needed (abdominal cramping), Starting Tue 3/11/2025, Normal      ferrous sulfate 324 (65 Fe) mg Take 1 tablet (324 mg total) by mouth 2 (two) times a day before meals, Starting 2024, Normal      fluticasone (FLONASE) 50 mcg/act nasal spray 2 sprays into each nostril daily as needed for rhinitis (congestion), Starting 2025, Normal      fluvoxaMINE (LUVOX) 100 mg tablet Fluvoxamine 100m tablet in the morning ; 2 tablets at night, Normal      lamoTRIgine (LaMICtal) 200 MG tablet Lamotrigine 200m tablet in the morning , 1 tablet at night, Normal      montelukast (SINGULAIR) 10 mg tablet Take 1 tablet (10 mg total) by mouth daily at bedtime, Starting 2024, Normal       Multiple Vitamin (MULTI-VITAMIN) tablet Take 1 tablet by mouth daily, Starting Thu 1/18/2024, Normal      omeprazole (PriLOSEC) 40 MG capsule TAKE 1 CAPSULE BY MOUTH DAILY  BEFORE BREAKFAST, Starting Mon 12/2/2024, Normal      ondansetron (ZOFRAN) 4 mg tablet Take 1 tablet (4 mg total) by mouth every 8 (eight) hours as needed for nausea or vomiting, Starting Thu 2/27/2025, Normal      !! QUEtiapine (SEROquel) 100 mg tablet Take 1 tablet (100 mg total) by mouth in the morning, Starting Thu 1/16/2025, Normal      !! QUEtiapine (SEROquel) 400 MG tablet Take 1 tablet (400 mg total) by mouth daily at bedtime Take with the Seroquel 100 mg to total 500 mg daily at bedtime, Starting Thu 1/16/2025, Normal      rosuvastatin (CRESTOR) 10 MG tablet Take 1 tablet (10 mg total) by mouth every evening, Starting Thu 2/27/2025, Normal      traMADol (ULTRAM) 50 mg tablet Take 1 tablet (50 mg total) by mouth 2 (two) times a day as needed for moderate pain, Starting Tue 3/4/2025, Normal       !! - Potential duplicate medications found. Please discuss with provider.          ED SEPSIS DOCUMENTATION   Time reflects when diagnosis was documented in both MDM as applicable and the Disposition within this note       Time User Action Codes Description Comment    3/22/2025 12:47 PM Aileen Richmond [R51.9] Headache     3/22/2025  4:58 PM Aileen Richmond [M26.629] TMJ syndrome     3/22/2025  4:58 PM Aileen Richmond [N20.0] Kidney stone     3/22/2025  5:02 PM Aileen Richmond [I63.81] Lacunar infarction (HCC)                  EMERITA Olson  03/23/25 [id number]

## 2025-03-22 NOTE — DISCHARGE INSTRUCTIONS
Follow up with neurology. A referral was placed. Take tylenol as needed for pain.   Please be aware the medication you are being prescribed flexeril can be sedating and therefore you should not use with any other sedating medications or alcohol, operate heavy machinery or drive until you know how it affects you.   A kidney stone was seen. Follow up with urology as necessary.

## 2025-03-22 NOTE — ASSESSMENT & PLAN NOTE
Mrs. Moralez is a 65 yo F with history of HTN, HLD, secondary hyperparathyroidism of renal origin, vitamin D deficiency, chronic pancreatitis, spondylolisthesis at L5-S1, hyperprolactinemia, MDD/bipolar disorder/generalized anxiety disorder/OCD/ panic disorder/eating disorder/opioid dependence, history of CVA, benign neoplasm of colon, stage IV CKD-still syndrome dialysis vascular access with left hand fistula, chronic pain syndrome, interstitial pulmonary disease, statin intolerance, moderate aortic stenosis, obstructive sleep apnea, generalized muscle weakness and right foot drop, who presented to [facility] with headache with LUE weakness and LLE weakness with numbness/tingling.     Patient describes left sided neck pain/jaw pain that radiates up to the left side of her head and down into the left abdomen. Patient states started yesterday ( 3/21/2025) at 1600.  Patient has chronic lumbar spondyloarthropathy with chronic left lower extremity sensorimotor dysfunction.  Patient stated the only new symptom she has is left upper extremity weakness in addition to headache and blurred vision.    WORK UP:     MRI brain 3/22/2025: There is no restricted diffusion to indicate an acute infarct. No acute hemorrhage, mass, mass effect, shift or herniation. Stable chronic cystic lacunar infarct in the left basal ganglia with peripheral hemosiderin deposition. Stable   T2/FLAIR hyperintensities in the periventricular and subcortical matter due to mild chronic microangiopathy.     MRA head 3/22/2025: Somewhat limited by motion. No large vessel occlusion. No definite high-grade intracranial stenosis.  No aneurysm seen but evaluation for small aneurysms is limited.    MRA Carotids: No significant stenosis.     CTH 3/22/25 :No acute intracranial abnormality. Chronic microangiopathic changes.     CT C-spine 3/22/2025:No cervical spine fracture or traumatic malalignment.     CT C/A/P: No acute findings throughout the chest, abdomen or  pelvis.  2.  Punctate right lower renal pole nonobstructive calculi without additional urinary calculi.    CTH 10/26/2024:  Decreased attenuation is noted in periventricular and subcortical white matter demonstrating an appearance that is statistically most likely to represent mild microangiopathic change; this appearance is similar when compared to most recent prior examination. Chronic left basal ganglia infarct.    ESR/CRP for concerning headache with jaw claudication- NORMAL.     PLAN:    - MRA head and neck negative for intracranial and extracranial pathology including aneurysm and dissection.  - MRI brain was not consistent with acute CVA/ICH considering new neurologic deficit.   - no concern for polymyalgia rheumatic with negative ESR/CRP  - evaluation by dental team for left TMJ, doubt trigeminal or glossopharyngeal neuralgia.  - Continue aspirin 81 mg daily with remote history of chronic left basal ganglia stroke  - PT/OT in outpatient settings

## 2025-03-22 NOTE — CONSULTS
Consultation - Neurology   Name: Ayleen Moralez 64 y.o. female I MRN: [id number]  Unit/Bed#: ED 04 I Date of Admission: 3/22/2025   Date of Service: 3/22/2025 I Hospital Day: 0   Consult to neurology  Consult performed by: Amanda Olivas MD  Consult ordered by: EMERITA Olson        Physician Requesting Evaluation: Peggy Maddox*   Reason for Evaluation / Principal Problem: headache with LUE weakness and LLE weakness with numbness/tingling.     Assessment & Plan  Left-sided weakness  Mrs. Moralez is a 65 yo F with history of HTN, HLD, secondary hyperparathyroidism of renal origin, vitamin D deficiency, chronic pancreatitis, spondylolisthesis at L5-S1, hyperprolactinemia, MDD/bipolar disorder/generalized anxiety disorder/OCD/ panic disorder/eating disorder/opioid dependence, history of CVA, benign neoplasm of colon, stage IV CKD-still syndrome dialysis vascular access with left hand fistula, chronic pain syndrome, interstitial pulmonary disease, statin intolerance, moderate aortic stenosis, obstructive sleep apnea, generalized muscle weakness and right foot drop, who presented to [facility] with headache with LUE weakness and LLE weakness with numbness/tingling.     Patient describes left sided neck pain/jaw pain that radiates up to the left side of her head and down into the left abdomen. Patient states started yesterday ( 3/21/2025) at 1600.  Patient has chronic lumbar spondyloarthropathy with chronic left lower extremity sensorimotor dysfunction.  Patient stated the only new symptom she has is left upper extremity weakness in addition to headache and blurred vision.    WORK UP:     MRI brain 3/22/2025: There is no restricted diffusion to indicate an acute infarct. No acute hemorrhage, mass, mass effect, shift or herniation. Stable chronic cystic lacunar infarct in the left basal ganglia with peripheral hemosiderin deposition. Stable   T2/FLAIR hyperintensities in the periventricular and  subcortical matter due to mild chronic microangiopathy.     MRA head 3/22/2025: Somewhat limited by motion. No large vessel occlusion. No definite high-grade intracranial stenosis.  No aneurysm seen but evaluation for small aneurysms is limited.    MRA Carotids: No significant stenosis.     CTH 3/22/25 :No acute intracranial abnormality. Chronic microangiopathic changes.     CT C-spine 3/22/2025:No cervical spine fracture or traumatic malalignment.     CT C/A/P: No acute findings throughout the chest, abdomen or pelvis.  2.  Punctate right lower renal pole nonobstructive calculi without additional urinary calculi.    CTH 10/26/2024:  Decreased attenuation is noted in periventricular and subcortical white matter demonstrating an appearance that is statistically most likely to represent mild microangiopathic change; this appearance is similar when compared to most recent prior examination. Chronic left basal ganglia infarct.    ESR/CRP for concerning headache with jaw claudication- NORMAL.     PLAN:    - MRA head and neck negative for intracranial and extracranial pathology including aneurysm and dissection.  - MRI brain was not consistent with acute CVA/ICH considering new neurologic deficit.   - no concern for polymyalgia rheumatic with negative ESR/CRP  - evaluation by dental team for left TMJ, doubt trigeminal or glossopharyngeal neuralgia.  - Continue aspirin 81 mg daily with remote history of chronic left basal ganglia stroke  - PT/OT in outpatient settings       I have discussed the above management plan in detail with the primary service.     Ayleen Moralez will need follow up in in 3 months with general attending or advance practitioner. She will not require outpatient neurological testing.    History of Present Illness   Ayleen Moralez is a 64 y.o. right handed female who presents with head/neck pain and left-sided weakness/numbness.  65 yo F with history of HTN, HLD, secondary hyperparathyroidism  "of renal origin, vitamin D deficiency, chronic pancreatitis, spondylolisthesis at L5-S1, hyperprolactinemia, MDD/bipolar disorder/generalized anxiety disorder/OCD/ panic disorder/eating disorder/opioid dependence,  history of CVA, benign neoplasm of colon, stage IV CKD-still syndrome dialysis vascular access with left hand fistula, chronic pain syndrome, interstitial pulmonary disease, statin intolerance, moderate aortic stenosis, obstructive sleep apnea, generalized muscle weakness and right foot drop, who presented to [facility] with headache with LUE weakness and LLE weakness with numbness/tingling.     Patient describes left sided neck pain/jaw pain that radiates up to the left side of her head and down into the left abdomen. Patient states started yesterday ( 3/21/2025) at 1600. Reports some ear pain. Patient states, \"Started with a sore throat.\" Patient states neck pain on left lateral aspect.  No fever/chills prior to admission.  No dysphagia or hoarseness, but patient describes chest pain and shortness of breath.   Patient described headache with blurred vision, numbness and tingling of left leg, with associated weakness.  Patient confirmed only has weakness of left upper extremity since, denies numbness or tingling. Patient states pain radiates from neck up to side of left head and down to LLQ.     Patient describes developing a sudden \"shock\" of pain on 3/21/2025 at 1600 and then passed out, stating \"my eyes went all black.\" Patient denies recent illness.   On exam, states bilateral blurred vision, pain to left side of her head. No pulsating mass at left temple. 5/5 LUE no numbness or tingling, 5/5 LLE no numbness or tingling, 4/5 right upper extremity, no numbness or tingling, 4/5 right lower extremity, reports numbness/tingling. Has a history of CKD, GFR is at baseline today with a GFR of 20.     Based on this headache, with associated neuro symptoms I did get a non-con CT head and neck, but also added the " c/a/p because of those complaints. She is not hypertensive. No fever or tachycardia. Otherwise resting with no acute distress.     Review of Systems   12 points of review of system was reviewed with the patient and was unremarkable with exception: see HPI  Medical History Review: I have reviewed the patient's PMH, PSH, Social History, Family History, Meds, and Allergies     Objective :  Temp:  [98.2 °F (36.8 °C)] 98.2 °F (36.8 °C)  HR:  [76-78] 76  BP: (143-150)/(74-76) 143/76  Resp:  [16-18] 16  SpO2:  [94 %-98 %] 94 %  O2 Device: None (Room air)    Physical ExamNeurological Exam  CONSTITUTIONAL: NAD, pleasant. NECK: supple, no lymphadenopathy, no thyromegaly, no JVD. CARDIOVASCULAR: RRR, normal S1S2, no murmurs, no rubs. RESP: clear to auscultation bilaterally, no wheezes/rhonchi/rales. ABDOMEN: soft, non tender, non distended. SKIN: no rash or skin lesions. EXTREMITIES: no edema, pulses 2+bilaterally. PSYCH: appropriate mood and affect  NEUROLOGIC COMPREHENSIVE EXAM: Patient is oriented to person, place and time, NAD; appropriate affect. CN II, III, IV, V, VI, VII,VIII,IX,X,XI-XII intact with EOMI, PERRLA, OKN intact, VF grossly intact, fundi poorly visualized secondary to pupillary constriction; symmetric face noted. Motor: 5/5 UE/LE bilateral symmetric, 4/5 left shoulder abduction and 3/5 left hip flexion, 5/5 RUE/RLE; Sensory: intact to light touch and pinprick bilaterally; normal vibration sensation feet bilaterally; Coordination within normal limits on FTN and KIKO testing; DTR: 2/4 through, 1/4 biceps, no Babinski, no clonus. Tandem gait is mildly abnormal.       Lab Results: I have reviewed the following results:I have personally reviewed pertinent reports.  , CBC:   Results from last 7 days   Lab Units 03/22/25  1110   WBC Thousand/uL 5.89   RBC Million/uL 3.62*   HEMOGLOBIN g/dL 11.7   HEMATOCRIT % 38.2   MCV fL 106*   PLATELETS Thousands/uL 207   , BMP/CMP:   Results from last 7 days   Lab Units  03/22/25  1110 03/17/25  0727   SODIUM mmol/L 141 142   POTASSIUM mmol/L 4.9 4.6   CHLORIDE mmol/L 111* 112*   CO2 mmol/L 23 24   BUN mg/dL 28* 28*   CREATININE mg/dL 2.48* 2.75*   CALCIUM mg/dL 8.9 9.4   AST U/L 22  --    ALT U/L 19  --    ALK PHOS U/L 108*  --    EGFR ml/min/1.73sq m 20 17   , Vitamin B12:   , HgBA1C:     Recent Labs     03/22/25  1110   WBC 5.89   HGB 11.7   HCT 38.2      SODIUM 141   K 4.9   *   CO2 23   BUN 28*   CREATININE 2.48*   GLUC 95     Imaging Results Review: I personally reviewed the following image studies/reports in PACS and discussed pertinent findings with Radiology: CT head and CT C-spine. My interpretation of the radiology images/reports is: no acute pathology.      VTE Prophylaxis: Sequential compression device (Venodyne)     Administrative Statements   I have spent a total time of 50  minutes in caring for this patient on the day of the visit/encounter including Diagnostic results, Risks and benefits of tx options, Patient and family education, Importance of tx compliance, Counseling / Coordination of care, Documenting in the medical record, Reviewing/placing orders in the medical record (including tests, medications, and/or procedures), and Obtaining or reviewing history  .

## 2025-03-23 ENCOUNTER — TELEPHONE (OUTPATIENT)
Age: 65
End: 2025-03-23

## 2025-03-23 NOTE — TELEPHONE ENCOUNTER
[facility]PP[facility] BUCKS 3/22/25/ Headaches (6 hours)  D/C 3/22/25    Ayleen SCHOFIELD Kirt will need follow up in in 3 months with general attending or advance practitioner. She will not require outpatient neurological testing.      1ST CALL ATTEMPT      Called pt. No answer. Unable to leave a message.

## 2025-03-24 ENCOUNTER — TELEPHONE (OUTPATIENT)
Age: 65
End: 2025-03-24

## 2025-03-24 LAB
ATRIAL RATE: 75 BPM
P AXIS: 53 DEGREES
PR INTERVAL: 164 MS
QRS AXIS: 153 DEGREES
QRSD INTERVAL: 136 MS
QT INTERVAL: 422 MS
QTC INTERVAL: 472 MS
T WAVE AXIS: 24 DEGREES
VENTRICULAR RATE: 75 BPM

## 2025-03-24 PROCEDURE — 93010 ELECTROCARDIOGRAM REPORT: CPT | Performed by: INTERNAL MEDICINE

## 2025-03-24 NOTE — TELEPHONE ENCOUNTER
Scheduled 8/22/25 2:30 Nikki Cueto MD [facility]; placed on wait list as well.  HFU SL UPPER BUCKS 3/22/25/ Headaches (6 hours)  D/C 3/22/25     Ayleen SCHOFIELD Dwightkevyn will need follow up in in 3 months with general attending or advance practitioner. She will not require outpatient neurological testing.

## 2025-03-24 NOTE — TELEPHONE ENCOUNTER
Pt calling in because she had BW done at the ER and was also told by Dr. Harp to do BW so she is wondering if that is still the case. I advised pt they will confirm that after her ER f/u visit on 3/27 and order the labs than.

## 2025-03-27 ENCOUNTER — OFFICE VISIT (OUTPATIENT)
Dept: FAMILY MEDICINE CLINIC | Facility: HOSPITAL | Age: 65
End: 2025-03-27
Payer: MEDICARE

## 2025-03-27 VITALS
WEIGHT: 203.8 LBS | HEART RATE: 76 BPM | HEIGHT: 67 IN | DIASTOLIC BLOOD PRESSURE: 84 MMHG | BODY MASS INDEX: 31.99 KG/M2 | SYSTOLIC BLOOD PRESSURE: 136 MMHG | OXYGEN SATURATION: 94 %

## 2025-03-27 DIAGNOSIS — I10 PRIMARY HYPERTENSION: ICD-10-CM

## 2025-03-27 DIAGNOSIS — M79.604 BILATERAL LEG PAIN: Primary | ICD-10-CM

## 2025-03-27 DIAGNOSIS — I63.81 LACUNAR INFARCTION (HCC): ICD-10-CM

## 2025-03-27 DIAGNOSIS — M79.605 BILATERAL LEG PAIN: Primary | ICD-10-CM

## 2025-03-27 DIAGNOSIS — S03.00XA DISLOCATION OF TEMPOROMANDIBULAR JOINT, INITIAL ENCOUNTER: Primary | ICD-10-CM

## 2025-03-27 PROCEDURE — 99214 OFFICE O/P EST MOD 30 MIN: CPT

## 2025-03-27 PROCEDURE — G2211 COMPLEX E/M VISIT ADD ON: HCPCS

## 2025-03-27 RX ORDER — AMLODIPINE BESYLATE 5 MG/1
5 TABLET ORAL DAILY
Qty: 90 TABLET | Refills: 3 | Status: SHIPPED | OUTPATIENT
Start: 2025-03-27

## 2025-03-27 NOTE — PROGRESS NOTES
"Name: Ayleen Moralez      : 1960      MRN: [id number]  Encounter Provider: Timbo Doss MD  Encounter Date: 3/27/2025   Encounter department: [facility] PRIMARY CARE [address]  :  Assessment & Plan  Dislocation of temporomandibular joint, initial encounter  Symptoms were resolved with Flexeril which she is no longer taking.  Scheduled to follow-up with ENT.  Orders:    Ambulatory Referral to Otolaryngology; Future    Lacunar infarction (HCC)  Incidental finding on brain MRI in ED, asymptomatic, advised to continue her aspirin 81 mg daily, and follow-up with neurology as scheduled       Primary hypertension  Controlled, amlodipine refill requested, continue amlodipine  Orders:    amLODIPine (NORVASC) 5 mg tablet; Take 1 tablet (5 mg total) by mouth daily           History of Present Illness   HPI  Patient presents for ED follow-up after having a headache and left-sided jaw pain radiating down her left side.  She is currently asymptomatic.    Review of Systems   Constitutional:  Negative for chills and fever.   HENT:          No jaw/face pain   Neurological:  Negative for dizziness, facial asymmetry, speech difficulty, weakness, light-headedness, numbness and headaches.       Objective   /84   Pulse 76   Ht 5' 7\" (1.702 m)   Wt 92.4 kg (203 lb 12.8 oz)   LMP  (LMP Unknown)   SpO2 94%   BMI 31.92 kg/m²      Physical Exam  Constitutional:       General: She is not in acute distress.     Appearance: Normal appearance.   Neurological:      Mental Status: She is alert and oriented to person, place, and time.   Psychiatric:         Mood and Affect: Mood normal.         Behavior: Behavior normal.         "

## 2025-03-28 DIAGNOSIS — R11.0 NAUSEA: ICD-10-CM

## 2025-03-28 RX ORDER — TRAMADOL HYDROCHLORIDE 50 MG/1
50 TABLET ORAL 2 TIMES DAILY PRN
Qty: 60 TABLET | Refills: 0 | Status: SHIPPED | OUTPATIENT
Start: 2025-03-28

## 2025-03-28 RX ORDER — ONDANSETRON 4 MG/1
4 TABLET, FILM COATED ORAL EVERY 8 HOURS PRN
Qty: 30 TABLET | Refills: 5 | Status: SHIPPED | OUTPATIENT
Start: 2025-03-28

## 2025-03-28 NOTE — TELEPHONE ENCOUNTER
Reason for call:   [x] Refill   [] Prior Auth  [] Other:     Office:   [x] PCP/Provider -  Bette Harp, DO   [] Specialty/Provider -     Medication: ondansetron (ZOFRAN) 4 mg tablet     Dose/Frequency: Take 1 tablet (4 mg total) by mouth every 8 (eight) hours as needed for nausea or vomiting     Quantity: 30    Pharmacy: [facility] [place] - [address] [facility].     Local Pharmacy   Does the patient have enough for 3 days?   [x] Yes   [] No - Send as HP to POD    Mail Away Pharmacy   Does the patient have enough for 10 days?   [] Yes   [] No - Send as HP to POD

## 2025-03-31 ENCOUNTER — TELEPHONE (OUTPATIENT)
Dept: FAMILY MEDICINE CLINIC | Facility: HOSPITAL | Age: 65
End: 2025-03-31

## 2025-03-31 NOTE — TELEPHONE ENCOUNTER
Patient called requesting refill for dicyclomine. Patient made aware medication was refilled on 3/11 for 30 with 0 refills to [facility] pharmacy. Patient instructed to contact the pharmacy to obtain refills of medication. Patient verbalized understanding.

## 2025-04-01 DIAGNOSIS — M79.604 BILATERAL LEG PAIN: ICD-10-CM

## 2025-04-01 DIAGNOSIS — M26.629 TMJ SYNDROME: ICD-10-CM

## 2025-04-01 DIAGNOSIS — R19.7 DIARRHEA, UNSPECIFIED TYPE: ICD-10-CM

## 2025-04-01 DIAGNOSIS — M79.605 BILATERAL LEG PAIN: ICD-10-CM

## 2025-04-02 DIAGNOSIS — R19.7 DIARRHEA, UNSPECIFIED TYPE: ICD-10-CM

## 2025-04-02 RX ORDER — DICYCLOMINE HYDROCHLORIDE 10 MG/1
10 CAPSULE ORAL 4 TIMES DAILY PRN
Qty: 30 CAPSULE | Refills: 0 | Status: SHIPPED | OUTPATIENT
Start: 2025-04-02

## 2025-04-02 RX ORDER — TRAMADOL HYDROCHLORIDE 50 MG/1
50 TABLET ORAL 2 TIMES DAILY PRN
Qty: 60 TABLET | Refills: 0 | OUTPATIENT
Start: 2025-04-02

## 2025-04-02 RX ORDER — CYCLOBENZAPRINE HCL 10 MG
10 TABLET ORAL
Qty: 30 TABLET | Refills: 0 | Status: SHIPPED | OUTPATIENT
Start: 2025-04-02

## 2025-04-02 NOTE — TELEPHONE ENCOUNTER
Patient called to request a refill for their cyclobenzaprine (FLEXERIL) 10 mg tablet and traMADol (ULTRAM) 50 mg tablet  advised a refill was requested on 4/1/2025 and is pending approval. Patient verbalized understanding and is in agreement.     Does the patient have enough for 3 days?   [x] Yes   [] No - Send as HP to POD

## 2025-04-02 NOTE — TELEPHONE ENCOUNTER
Patient called to request a refill for their dicyclomine (BENTYL) 10 mg capsule  advised a refill was requested on 4/1/2025 and is pending approval. Patient verbalized understanding and is in agreement.     Does the patient have enough for 3 days?   [x] Yes   [] No - Send as HP to POD

## 2025-04-03 ENCOUNTER — TELEPHONE (OUTPATIENT)
Dept: FAMILY MEDICINE CLINIC | Facility: HOSPITAL | Age: 65
End: 2025-04-03

## 2025-04-03 RX ORDER — DICYCLOMINE HYDROCHLORIDE 10 MG/1
10 CAPSULE ORAL 4 TIMES DAILY PRN
Qty: 30 CAPSULE | Refills: 0 | OUTPATIENT
Start: 2025-04-03

## 2025-04-03 NOTE — TELEPHONE ENCOUNTER
Patient called requesting refill for cyclobenzaprine (FLEXERIL) 10 mg tablet and dicyclomine (BENTYL) 10 mg capsule. Patient made aware medication was sent  on 4/2/25 for 30 with 0 refills to [place] [id number] [facility] [place] - [facility].  pharmacy. Patient instructed to contact the pharmacy to obtain refills of medication. Patient verbalized understanding.

## 2025-04-07 ENCOUNTER — HOSPITAL ENCOUNTER (EMERGENCY)
Facility: HOSPITAL | Age: 65
Discharge: HOME | End: 2025-04-07
Attending: EMERGENCY MEDICINE
Payer: MEDICARE

## 2025-04-07 ENCOUNTER — APPOINTMENT (EMERGENCY)
Dept: CT IMAGING | Facility: HOSPITAL | Age: 65
End: 2025-04-07
Payer: MEDICARE

## 2025-04-07 VITALS
TEMPERATURE: 98.1 F | HEIGHT: 67 IN | HEART RATE: 81 BPM | RESPIRATION RATE: 18 BRPM | WEIGHT: 200 LBS | SYSTOLIC BLOOD PRESSURE: 134 MMHG | DIASTOLIC BLOOD PRESSURE: 65 MMHG | BODY MASS INDEX: 31.39 KG/M2 | OXYGEN SATURATION: 95 %

## 2025-04-07 DIAGNOSIS — R19.7 DIARRHEA, UNSPECIFIED TYPE: ICD-10-CM

## 2025-04-07 DIAGNOSIS — K52.9 COLITIS: Primary | ICD-10-CM

## 2025-04-07 DIAGNOSIS — E83.42 HYPOMAGNESEMIA: ICD-10-CM

## 2025-04-07 LAB
ALBUMIN SERPL BCG-MCNC: 4.1 G/DL (ref 3.5–5)
ALP SERPL-CCNC: 104 U/L (ref 34–104)
ALT SERPL W P-5'-P-CCNC: 15 U/L (ref 7–52)
ANION GAP SERPL CALCULATED.3IONS-SCNC: 10 MMOL/L (ref 4–13)
AST SERPL W P-5'-P-CCNC: 18 U/L (ref 13–39)
BACTERIA UR QL AUTO: NORMAL /HPF
BASOPHILS # BLD AUTO: 0.09 THOUSANDS/ÂΜL (ref 0–0.1)
BASOPHILS NFR BLD AUTO: 1 % (ref 0–1)
BILIRUB SERPL-MCNC: 0.31 MG/DL (ref 0.2–1)
BILIRUB UR QL STRIP: NEGATIVE
BUN SERPL-MCNC: 29 MG/DL (ref 5–25)
CALCIUM SERPL-MCNC: 10.5 MG/DL (ref 8.4–10.2)
CHLORIDE SERPL-SCNC: 108 MMOL/L (ref 96–108)
CLARITY UR: CLEAR
CO2 SERPL-SCNC: 22 MMOL/L (ref 21–32)
COLOR UR: YELLOW
CREAT SERPL-MCNC: 2.89 MG/DL (ref 0.6–1.3)
EOSINOPHIL # BLD AUTO: 1 THOUSAND/ÂΜL (ref 0–0.61)
EOSINOPHIL NFR BLD AUTO: 11 % (ref 0–6)
ERYTHROCYTE [DISTWIDTH] IN BLOOD BY AUTOMATED COUNT: 12.2 % (ref 11.6–15.1)
FLUAV AG UPPER RESP QL IA.RAPID: NEGATIVE
FLUBV AG UPPER RESP QL IA.RAPID: NEGATIVE
GFR SERPL CREATININE-BSD FRML MDRD: 16 ML/MIN/1.73SQ M
GLUCOSE SERPL-MCNC: 131 MG/DL (ref 65–140)
GLUCOSE UR STRIP-MCNC: ABNORMAL MG/DL
HCT VFR BLD AUTO: 37.6 % (ref 34.8–46.1)
HGB BLD-MCNC: 12.1 G/DL (ref 11.5–15.4)
HGB UR QL STRIP.AUTO: NEGATIVE
IMM GRANULOCYTES # BLD AUTO: 0.04 THOUSAND/UL (ref 0–0.2)
IMM GRANULOCYTES NFR BLD AUTO: 1 % (ref 0–2)
KETONES UR STRIP-MCNC: NEGATIVE MG/DL
LACTATE SERPL-SCNC: 0.4 MMOL/L (ref 0.5–2)
LEUKOCYTE ESTERASE UR QL STRIP: ABNORMAL
LIPASE SERPL-CCNC: 49 U/L (ref 11–82)
LYMPHOCYTES # BLD AUTO: 1.47 THOUSANDS/ÂΜL (ref 0.6–4.47)
LYMPHOCYTES NFR BLD AUTO: 17 % (ref 14–44)
MAGNESIUM SERPL-MCNC: 1.7 MG/DL (ref 1.9–2.7)
MCH RBC QN AUTO: 33 PG (ref 26.8–34.3)
MCHC RBC AUTO-ENTMCNC: 32.2 G/DL (ref 31.4–37.4)
MCV RBC AUTO: 103 FL (ref 82–98)
MONOCYTES # BLD AUTO: 0.62 THOUSAND/ÂΜL (ref 0.17–1.22)
MONOCYTES NFR BLD AUTO: 7 % (ref 4–12)
NEUTROPHILS # BLD AUTO: 5.63 THOUSANDS/ÂΜL (ref 1.85–7.62)
NEUTS SEG NFR BLD AUTO: 63 % (ref 43–75)
NITRITE UR QL STRIP: NEGATIVE
NON-SQ EPI CELLS URNS QL MICRO: NORMAL /HPF
NRBC BLD AUTO-RTO: 0 /100 WBCS
PH UR STRIP.AUTO: 6 [PH]
PLATELET # BLD AUTO: 253 THOUSANDS/UL (ref 149–390)
PMV BLD AUTO: 10.9 FL (ref 8.9–12.7)
POTASSIUM SERPL-SCNC: 3.7 MMOL/L (ref 3.5–5.3)
PROT SERPL-MCNC: 6.8 G/DL (ref 6.4–8.4)
PROT UR STRIP-MCNC: ABNORMAL MG/DL
RBC # BLD AUTO: 3.67 MILLION/UL (ref 3.81–5.12)
RBC #/AREA URNS AUTO: NORMAL /HPF
SARS-COV+SARS-COV-2 AG RESP QL IA.RAPID: NEGATIVE
SODIUM SERPL-SCNC: 140 MMOL/L (ref 135–147)
SP GR UR STRIP.AUTO: 1.02 (ref 1–1.03)
UROBILINOGEN UR STRIP-ACNC: <2 MG/DL
WBC # BLD AUTO: 8.85 THOUSAND/UL (ref 4.31–10.16)
WBC #/AREA URNS AUTO: NORMAL /HPF

## 2025-04-07 PROCEDURE — 83735 ASSAY OF MAGNESIUM: CPT | Performed by: EMERGENCY MEDICINE

## 2025-04-07 PROCEDURE — 85025 COMPLETE CBC W/AUTO DIFF WBC: CPT | Performed by: EMERGENCY MEDICINE

## 2025-04-07 PROCEDURE — 99284 EMERGENCY DEPT VISIT MOD MDM: CPT

## 2025-04-07 PROCEDURE — 87811 SARS-COV-2 COVID19 W/OPTIC: CPT | Performed by: EMERGENCY MEDICINE

## 2025-04-07 PROCEDURE — 81001 URINALYSIS AUTO W/SCOPE: CPT | Performed by: EMERGENCY MEDICINE

## 2025-04-07 PROCEDURE — 36415 COLL VENOUS BLD VENIPUNCTURE: CPT | Performed by: EMERGENCY MEDICINE

## 2025-04-07 PROCEDURE — 96365 THER/PROPH/DIAG IV INF INIT: CPT

## 2025-04-07 PROCEDURE — 80053 COMPREHEN METABOLIC PANEL: CPT | Performed by: EMERGENCY MEDICINE

## 2025-04-07 PROCEDURE — 87505 NFCT AGENT DETECTION GI: CPT | Performed by: EMERGENCY MEDICINE

## 2025-04-07 PROCEDURE — 96361 HYDRATE IV INFUSION ADD-ON: CPT

## 2025-04-07 PROCEDURE — 74176 CT ABD & PELVIS W/O CONTRAST: CPT

## 2025-04-07 PROCEDURE — 83605 ASSAY OF LACTIC ACID: CPT | Performed by: EMERGENCY MEDICINE

## 2025-04-07 PROCEDURE — 99285 EMERGENCY DEPT VISIT HI MDM: CPT | Performed by: EMERGENCY MEDICINE

## 2025-04-07 PROCEDURE — 83690 ASSAY OF LIPASE: CPT | Performed by: EMERGENCY MEDICINE

## 2025-04-07 PROCEDURE — 87804 INFLUENZA ASSAY W/OPTIC: CPT | Performed by: EMERGENCY MEDICINE

## 2025-04-07 RX ORDER — MAGNESIUM SULFATE HEPTAHYDRATE 40 MG/ML
2 INJECTION, SOLUTION INTRAVENOUS ONCE
Status: COMPLETED | OUTPATIENT
Start: 2025-04-07 | End: 2025-04-07

## 2025-04-07 RX ADMIN — SODIUM CHLORIDE 1000 ML: 0.9 INJECTION, SOLUTION INTRAVENOUS at 09:49

## 2025-04-07 RX ADMIN — MAGNESIUM SULFATE HEPTAHYDRATE 2 G: 40 INJECTION, SOLUTION INTRAVENOUS at 12:07

## 2025-04-07 NOTE — ED PROVIDER NOTES
Time reflects when diagnosis was documented in both MDM as applicable and the Disposition within this note       Time User Action Codes Description Comment    4/7/2025 12:49 PM Tan, Peggy J Add [K52.9] Colitis     4/7/2025 12:49 PM TanPeggy osman Add [E83.42] Hypomagnesemia     4/7/2025  1:42 PM TanPeggy osman Add [R19.7] Diarrhea, unspecified type           ED Disposition       ED Disposition   Discharge    Condition   Stable    Date/Time   Mon Apr 7, 2025  1:43 PM    Comment   Ayleen Moralez discharge to home/self care.                   Assessment & Plan       Medical Decision Making  64 year old female presents for evaluation of diarrhea, nausea and lower abdominal pain for 1 week.  CKD on CMP.  LFTs and lipase WNL.  Hypomagnesemia secondary to diarrhea for which 2 g IV magnesium given.  CT abd/pelvis without signs of cholecystitis or acute pancreatitis.  Colitis present.  Lactate WNL therefore ischemic colitis unlikely.  Stool PCR and Cdiff testing pending.  Continue symptomatic management with bentyl.  PCP follow up.  Return precautions provided.    Amount and/or Complexity of Data Reviewed  Labs: ordered. Decision-making details documented in ED Course.  Radiology: ordered.    Risk  Prescription drug management.        ED Course as of 04/07/25 1344   Mon Apr 07, 2025   1059 Creatinine(!): 2.89  2.48 two weeks ago; however, 2.75 three weeks ago   1100 MAGNESIUM(!): 1.7       Medications   sodium chloride 0.9 % bolus 1,000 mL (0 mL Intravenous Stopped 4/7/25 1116)   magnesium sulfate 2 g/50 mL IVPB (premix) 2 g (0 g Intravenous Stopped 4/7/25 1309)       ED Risk Strat Scores                            SBIRT 22yo+      Flowsheet Row Most Recent Value   Initial Alcohol Screen: US AUDIT-C     1. How often do you have a drink containing alcohol? 0 Filed at: 04/07/2025 0940   2. How many drinks containing alcohol do you have on a typical day you are drinking?  0 Filed at: 04/07/2025 0940   3a.  Male UNDER 65: How often do you have five or more drinks on one occasion? 0 Filed at: 2025 0940   3b. FEMALE Any Age, or MALE 65+: How often do you have 4 or more drinks on one occassion? 0 Filed at: 2025 0940   Audit-C Score 0 Filed at: 2025 0940                            History of Present Illness       Chief Complaint   Patient presents with    Diarrhea     Pt to ED c/o diarrhea since Monday. States she is going 4/5x a day everyday. Denies vomiting. +nausea. +lower abd cramping       Past Medical History:   Diagnosis Date    Aftercare following left knee joint replacement surgery 02/15/2024    Anxiety     Anxiety disorder     Arthritis     At risk for falls     Bipolar 2 disorder (HCC)     Chronic back pain     Chronic kidney disease     Closed fracture of distal end of right fibula with routine healing 2020    COVID-19     in 2021    CVA (cerebral vascular accident) (HCC)     noted on MRI in the past    Depression     GERD (gastroesophageal reflux disease)     Hypercholesteremia     Hypernatremia     Hypertension     Hypokalemia     Idiopathic chronic pancreatitis (HCC) 2018    Intervertebral disc disorder with radiculopathy of lumbosacral region     resolved: 2015    Limb alert care status     LUE-fistula    Panic attacks     Pericardial effusion     PONV (postoperative nausea and vomiting)     Psychiatric problem     Radiculitis     resolved: 2015    Secondary renal hyperparathyroidism (HCC)     Stroke (HCC)     Vitamin D deficiency       Past Surgical History:   Procedure Laterality Date    BUNIONECTOMY      Left foot     CAST APPLICATION Right 2022    Procedure: Application short-arm thumb spica splint;  Surgeon: Lyndon Victoria MD;  Location:  MAIN OR;  Service: Orthopedics    COLON SURGERY      COLONOSCOPY  2018    COLONOSCOPY  2021    DILATION AND CURETTAGE OF UTERUS      INDUCED       surgically induced    OK ARTERIOVENOUS  ANASTOMOSIS OPEN DIRECT Left 11/18/2019    Procedure: CREATION FISTULA ARTERIOVENOUS (AV) left wrists possible left upper;  Surgeon: Andrey Quintero MD;  Location: [facility];  Service: Vascular    NV ARTHRP KNE CONDYLE&PLATU MEDIAL&LAT COMPARTMENTS Left 02/05/2024    Procedure: ARTHROPLASTY KNEE TOTAL SAME DAY;  Surgeon: Riki Ma MD;  [facility]: [facility];  Service: Orthopedics    NV CORRJ HLX VLGS BNCTY SESMDC DSTL METAR OSTEOT Left 07/01/2019    Procedure: Serge bunionectomy;  Surgeon: Munir Larkin DPM;  [facility];  Service: Podiatry    NV CORRJ HLX VLGS BNCTY SESMDC DSTL METAR OSTEOT Right 08/03/2020    Procedure: BUNIONECTOMY RAFAEL;  Surgeon: Munir Larkin DPM;  [facility];  Service: Podiatry    NV CORRJ HLX VLGS BNCTY SESMDC PROX PHLX OSTEOT Right 09/27/2021    Procedure: BUNIONECTOMY TAMEKA, right tameka osteotomy and 2nd claw toe correction;  Surgeon: James R Lachman, MD;  [facility];  Service: Orthopedics    NV ERCP DX COLLECTION SPECIMEN BRUSHING/WASHING N/A 04/11/2018    Procedure: ENDOSCOPIC RETROGRADE CHOLANGIOPANCREATOGRAPHY (ERCP);  Surgeon: Alfredo Messina MD;  [facility];  Service: Gastroenterology    NV LAPAROSCOPY PROCTOPEXY PROLAPSE N/A 07/13/2018    Procedure: ROBOTIC SIGMOID RESECTION / RECTOPEXY;  Surgeon: ELPIDIO Mcnulty MD;  [facility];  Service: Colorectal    NV OPEN TREATMENT RADIAL SHAFT FRACTURE W/INT FIXJ Right 10/11/2021    Procedure: OPEN REDUCTION W/ INTERNAL FIXATION (ORIF) RADIUS (WRIST), RIGHT DISTAL;  Surgeon: Riki Ma MD;  [facility];  Service: Orthopedics    NV REMOVAL IMPLANT DEEP Right 06/23/2022    Procedure: Removal of hardware volar aspect right distal radius (distal radial plate and screws);  Surgeon: Lyndon Victoria MD;  [facility];  Service: Orthopedics    NV SIGMOIDOSCOPY FLX DX W/COLLJ SPEC BR/WA IF PFRMD N/A 07/13/2018    Procedure: SIGMOIDOSCOPY FLEXIBLE;  Surgeon: ELPIDIO Rodriguez  MD [place];  [facility];  Service: Colorectal    UT TR TDN RESTORE INTRNSC FUNCJ ALL 4 FNGRS Right 06/23/2022    Procedure: Right ring finger flexor digitorum superficialis to flexor pollicis longus tendon transfer;  Surgeon: Lyndon Victoria MD;  Location:  MAIN OR;  Service: Orthopedics    TUBAL LIGATION Bilateral 1997    US GUIDED THYROID BIOPSY  07/30/2019      Family History   Problem Relation Age of Onset    Bipolar disorder Mother     Mental illness Mother         depression    Stroke Mother     Dementia Mother     Colon polyps Mother     Heart disease Father     Hypertension Father     Diabetes Father     Mental illness Sister     Colon polyps Sister     Mental illness Sister     Heart disease Sister     No Known Problems Sister     Breast cancer Sister 68    No Known Problems Maternal Grandmother     No Known Problems Maternal Grandfather     Breast cancer Paternal Grandmother         age unknown    No Known Problems Paternal Grandfather     Hypertension Son     Obesity Son     No Known Problems Son     Breast cancer Maternal Aunt         age unknown    Breast cancer Paternal Aunt         age unknown    Breast cancer Paternal Aunt         age unknown    Diabetes Family     Heart disease Family     Hypertension Family     Stroke Family     Thyroid disease Family     Throat cancer Niece 48    Substance Abuse Neg Hx         neg fam hx    Colon cancer Neg Hx       Social History     Tobacco Use    Smoking status: Never    Smokeless tobacco: Never   Vaping Use    Vaping status: Never Used   Substance Use Topics    Alcohol use: Not Currently    Drug use: Not Currently     Types: Hydrocodone, Marijuana     Comment: MEDICATION   Ayleen has h/o marijuana abuse- not currently      E-Cigarette/Vaping    E-Cigarette Use Never User       E-Cigarette/Vaping Substances    Nicotine No     THC No     CBD No     Flavoring No     Other No     Unknown No       I have reviewed and agree with the history as  documented.     64 year old female presents for evaluation of 1 week of diarrhea associated with nausea and lower abdominal pain.  Patient reports up to 6 loose/watery stools per day.  No black or bloody bowel movements.  No vomiting, fevers or chills.  No recent antibiotics or hospitalizations.  Patient has been taking bentyl for her symptoms with last dose yesterday evening which she feels has not been effective at treating her symptoms.  History of CKD with fistula left forearm, but not yet on dialysis.  No dysuria or frequency.        Diarrhea      Review of Systems   Gastrointestinal:  Positive for diarrhea.           Objective       ED Triage Vitals [04/07/25 0940]   Temperature Pulse Blood Pressure Respirations SpO2 Patient Position - Orthostatic VS   98.1 °F (36.7 °C) 84 151/70 18 96 % Sitting      Temp src Heart Rate Source BP Location FiO2 (%) Pain Score    -- Monitor Right arm -- 8      Vitals      Date and Time Temp Pulse SpO2 Resp BP Pain Score FACES Pain Rating User   04/07/25 1215 -- 81 95 % 18 134/65 -- -- EW   04/07/25 1130 -- 79 95 % -- 152/70 -- -- MB   04/07/25 0957 -- -- -- -- -- No Pain -- EW   04/07/25 0945 -- 79 94 % 18 151/71 -- -- EW   04/07/25 0940 98.1 °F (36.7 °C) 84 96 % 18 151/70 8 -- ML            Physical Exam  Vitals and nursing note reviewed.   Cardiovascular:      Rate and Rhythm: Normal rate and regular rhythm.      Pulses: Normal pulses.      Comments: Left forearm av fistula in place with palpable thrill  Pulmonary:      Effort: Pulmonary effort is normal. No respiratory distress.   Abdominal:      General: There is no distension.      Palpations: Abdomen is soft.      Tenderness: There is abdominal tenderness in the suprapubic area. There is no guarding or rebound.         Results Reviewed       Procedure Component Value Units Date/Time    Lactic acid, plasma (w/reflex if result > 2.0) [[id number]]  (Abnormal) Collected: 04/07/25 1307    Lab Status: Final result Specimen: Blood  from Arm, Right Updated: 04/07/25 1337     LACTIC ACID 0.4 mmol/L     Narrative:      Result may be elevated if tourniquet was used during collection.    Clostridium difficile toxin by PCR with EIA [[id number]] Collected: 04/07/25 1124    Lab Status: In process Specimen: Stool from Rectum Updated: 04/07/25 1131    Stool Enteric Bacterial Panel by PCR [[id number]] Collected: 04/07/25 1124    Lab Status: In process Specimen: Stool from Rectum Updated: 04/07/25 1131    Urine Microscopic [[id number]]  (Normal) Collected: 04/07/25 0951    Lab Status: Final result Specimen: Urine, Clean Catch Updated: 04/07/25 1051     RBC, UA None Seen /hpf      WBC, UA 1-2 /hpf      Epithelial Cells None Seen /hpf      Bacteria, UA None Seen /hpf     UA w Reflex to Microscopic w Reflex to Culture [[id number]]  (Abnormal) Collected: 04/07/25 0951    Lab Status: Final result Specimen: Urine, Clean Catch Updated: 04/07/25 1039     Color, UA Yellow     Clarity, UA Clear     Specific Gravity, UA 1.025     pH, UA 6.0     Leukocytes, UA Small     Nitrite, UA Negative     Protein, UA Trace mg/dl      Glucose, UA 30 (3/100%) mg/dl      Ketones, UA Negative mg/dl      Urobilinogen, UA <2.0 mg/dl      Bilirubin, UA Negative     Occult Blood, UA Negative    Comprehensive metabolic panel [[id number]]  (Abnormal) Collected: 04/07/25 0952    Lab Status: Final result Specimen: Blood from Arm, Right Updated: 04/07/25 1037     Sodium 140 mmol/L      Potassium 3.7 mmol/L      Chloride 108 mmol/L      CO2 22 mmol/L      ANION GAP 10 mmol/L      BUN 29 mg/dL      Creatinine 2.89 mg/dL      Glucose 131 mg/dL      Calcium 10.5 mg/dL      AST 18 U/L      ALT 15 U/L      Alkaline Phosphatase 104 U/L      Total Protein 6.8 g/dL      Albumin 4.1 g/dL      Total Bilirubin 0.31 mg/dL      eGFR 16 ml/min/1.73sq m     Narrative:      National Kid[employer] guidelines for Chronic Kidney Disease (CKD):     Stage 1 with normal or high GFR (GFR > 90 mL/min/1.73  square meters)    Stage 2 Mild CKD (GFR = 60-89 mL/min/1.73 square meters)    Stage 3A Moderate CKD (GFR = 45-59 mL/min/1.73 square meters)    Stage 3B Moderate CKD (GFR = 30-44 mL/min/1.73 square meters)    Stage 4 Severe CKD (GFR = 15-29 mL/min/1.73 square meters)    Stage 5 End Stage CKD (GFR <15 mL/min/1.73 square meters)  Note: GFR calculation is accurate only with a steady state creatinine    Lipase [[id number]]  (Normal) Collected: 04/07/25 0952    Lab Status: Final result Specimen: Blood from Arm, Right Updated: 04/07/25 1037     Lipase 49 u/L     Magnesium [[id number]]  (Abnormal) Collected: 04/07/25 0952    Lab Status: Final result Specimen: Blood from Arm, Right Updated: 04/07/25 1037     Magnesium 1.7 mg/dL     FLU/COVID Rapid Antigen (30 min. TAT) - Preferred screening test in ED [[id number]]  (Normal) Collected: 04/07/25 0952    Lab Status: Final result Specimen: Nares from Nose Updated: 04/07/25 1036     SARS COV Rapid Antigen Negative     Influenza A Rapid Antigen Negative     Influenza B Rapid Antigen Negative    Narrative:      This test has been performed using the Quidel Martha 2 FLU+SARS Antigen test under the Emergency Use Authorization (EUA). This test has been validated by the  and verified by the performing laboratory. The Martha uses lateral flow immunofluorescent sandwich assay to detect SARS-COV, Influenza A and Influenza B Antigen.     The Quidel Martha 2 SARS Antigen test does not differentiate between SARS-CoV and SARS-CoV-2.     Negative results are presumptive and may be confirmed with a molecular assay, if necessary, for patient management. Negative results do not rule out SARS-CoV-2 or influenza infection and should not be used as the sole basis for treatment or patient management decisions. A negative test result may occur if the level of antigen in a sample is below the limit of detection of this test.     Positive results are indicative of the presence of viral antigens,  but do not rule out bacterial infection or co-infection with other viruses.     All test results should be used as an adjunct to clinical observations and other information available to the provider.    FOR PEDIATRIC PATIENTS - copy/paste COVID Guidelines URL to browser: [email or web address]    CBC and differential [[id number]]  (Abnormal) Collected: 04/07/25 0952    Lab Status: Final result Specimen: Blood from Arm, Right Updated: 04/07/25 1019     WBC 8.85 Thousand/uL      RBC 3.67 Million/uL      Hemoglobin 12.1 g/dL      Hematocrit 37.6 %       fL      MCH 33.0 pg      MCHC 32.2 g/dL      RDW 12.2 %      MPV 10.9 fL      Platelets 253 Thousands/uL      nRBC 0 /100 WBCs      Segmented % 63 %      Immature Grans % 1 %      Lymphocytes % 17 %      Monocytes % 7 %      Eosinophils Relative 11 %      Basophils Relative 1 %      Absolute Neutrophils 5.63 Thousands/µL      Absolute Immature Grans 0.04 Thousand/uL      Absolute Lymphocytes 1.47 Thousands/µL      Absolute Monocytes 0.62 Thousand/µL      Eosinophils Absolute 1.00 Thousand/µL      Basophils Absolute 0.09 Thousands/µL             CT abdomen pelvis wo contrast   Final Interpretation by Marcos Lorenzana MD (04/07 1240)      Inflammatory changes around the splenic flexure are suspicious for infectious or inflammatory colitis.  An additional differential consideration is ischemic colitis, but this is favored to be unlikely.      Workstation performed: [id number]             Procedures    ED Medication and Procedure Management   Prior to Admission Medications   Prescriptions Last Dose Informant Patient Reported? Taking?   AMILoride 5 mg tablet   No No   Sig: Take 1 tablet (5 mg total) by mouth daily   Patient not taking: Reported on 3/27/2025   Cyanocobalamin (VITAMIN B 12 PO)  Self Yes No   Sig: Take 1,000 mcg by mouth in the morning   Multiple Vitamin (MULTI-VITAMIN) tablet  Self No No   Sig: Take 1  tablet by mouth daily   QUEtiapine (SEROquel) 100 mg tablet  Self No No   Sig: Take 1 tablet (100 mg total) by mouth in the morning   QUEtiapine (SEROquel) 400 MG tablet  Self No No   Sig: Take 1 tablet (400 mg total) by mouth daily at bedtime Take with the Seroquel 100 mg to total 500 mg daily at bedtime   acetaminophen (TYLENOL) 500 mg tablet  Self No No   Sig: Take 2 tablets (1,000 mg total) by mouth every 8 (eight) hours   albuterol (Ventolin HFA) 90 mcg/act inhaler  Self No No   Sig: Inhale 2 puffs every 6 (six) hours as needed for wheezing or shortness of breath   amLODIPine (NORVASC) 5 mg tablet   No No   Sig: Take 1 tablet (5 mg total) by mouth daily   ascorbic acid (VITAMIN C) 500 MG tablet  Self No No   Sig: Take 1 tablet (500 mg total) by mouth 2 (two) times a day   aspirin 81 mg chewable tablet   No No   Sig: Chew 1 tablet (81 mg total) daily   budesonide-formoterol (Symbicort) 160-4.5 mcg/act inhaler  Self No No   Sig: Inhale 2 puffs 2 (two) times a day Rinse mouth after use.   calcium carbonate (Calcium 600) 600 MG tablet  Self Yes No   Sig: Take 600 mg by mouth 2 (two) times a day with meals   carvedilol (COREG) 25 mg tablet   No No   Sig: TAKE 1 TABLET BY MOUTH TWICE  DAILY WITH MEALS   cholecalciferol (VITAMIN D3) 1,000 units tablet  Self No No   Sig: Take 5 tablets (5,000 Units total) by mouth daily   clonazePAM (KlonoPIN) 0.5 mg tablet  Self No No   Sig: Take 1 tablet (0.5 mg total) by mouth 3 (three) times a day Do not start before January 2, 2025.   cyclobenzaprine (FLEXERIL) 10 mg tablet   No No   Sig: Take 1 tablet (10 mg total) by mouth daily at bedtime   denosumab (XGEVA) 120 mg/1.7 mL   Yes No   Sig: Inject 120 mg under the skin   dextromethorphan-guaifenesin (MUCINEX DM)  MG per 12 hr tablet   No No   Sig: Take 1 tablet by mouth every 12 (twelve) hours   Patient not taking: Reported on 3/27/2025   dicyclomine (BENTYL) 10 mg capsule   No No   Sig: Take 1 capsule (10 mg total) by mouth  4 (four) times a day as needed (abdominal cramping)   ferrous sulfate 324 (65 Fe) mg  Self No No   Sig: Take 1 tablet (324 mg total) by mouth 2 (two) times a day before meals   fluticasone (FLONASE) 50 mcg/act nasal spray   No No   Si sprays into each nostril daily as needed for rhinitis (congestion)   Patient not taking: Reported on 3/27/2025   fluvoxaMINE (LUVOX) 100 mg tablet  Self No No   Sig: Fluvoxamine 100m tablet in the morning ; 2 tablets at night   lamoTRIgine (LaMICtal) 200 MG tablet  Self No No   Sig: Lamotrigine 200m tablet in the morning , 1 tablet at night   montelukast (SINGULAIR) 10 mg tablet  Self No No   Sig: Take 1 tablet (10 mg total) by mouth daily at bedtime   omeprazole (PriLOSEC) 40 MG capsule  Self No No   Sig: TAKE 1 CAPSULE BY MOUTH DAILY  BEFORE BREAKFAST   ondansetron (ZOFRAN) 4 mg tablet   No No   Sig: Take 1 tablet (4 mg total) by mouth every 8 (eight) hours as needed for nausea or vomiting   rosuvastatin (CRESTOR) 10 MG tablet   No No   Sig: Take 1 tablet (10 mg total) by mouth every evening   traMADol (ULTRAM) 50 mg tablet   No No   Sig: Take 1 tablet (50 mg total) by mouth 2 (two) times a day as needed for moderate pain      Facility-Administered Medications: None     Patient's Medications   Discharge Prescriptions    No medications on file     No discharge procedures on file.  ED SEPSIS DOCUMENTATION   Time reflects when diagnosis was documented in both MDM as applicable and the Disposition within this note       Time User Action Codes Description Comment    2025 12:49 PM Peggy Maddox [K52.9] Colitis     2025 12:49 PM Peggy Maddox [E83.42] Hypomagnesemia     2025  1:42 PM Peggy Maddox [R19.7] Diarrhea, unspecified type                  Peggy Maddox MD  [birth year] [id number]

## 2025-04-08 ENCOUNTER — VBI (OUTPATIENT)
Dept: FAMILY MEDICINE CLINIC | Facility: HOSPITAL | Age: 65
End: 2025-04-08

## 2025-04-08 ENCOUNTER — TELEPHONE (OUTPATIENT)
Dept: PSYCHIATRY | Facility: CLINIC | Age: 65
End: 2025-04-08

## 2025-04-08 DIAGNOSIS — F31.10 BIPOLAR I DISORDER, MOST RECENT EPISODE MANIC (HCC): ICD-10-CM

## 2025-04-08 DIAGNOSIS — F31.9 BIPOLAR 1 DISORDER (HCC): Primary | ICD-10-CM

## 2025-04-08 DIAGNOSIS — F31.81 BIPOLAR 2 DISORDER (HCC): Chronic | ICD-10-CM

## 2025-04-08 LAB
C COLI+JEJUNI TUF STL QL NAA+PROBE: NEGATIVE
C DIFF TOX GENS STL QL NAA+PROBE: NEGATIVE
EC STX1+STX2 GENES STL QL NAA+PROBE: NEGATIVE
SALMONELLA SP SPAO STL QL NAA+PROBE: NEGATIVE
SHIGELLA SP+EIEC IPAH STL QL NAA+PROBE: NEGATIVE

## 2025-04-08 RX ORDER — LAMOTRIGINE 200 MG/1
TABLET ORAL
Qty: 180 TABLET | Refills: 0 | Status: SHIPPED | OUTPATIENT
Start: 2025-04-08

## 2025-04-08 RX ORDER — QUETIAPINE FUMARATE 300 MG/1
300 TABLET, FILM COATED ORAL DAILY
Qty: 90 TABLET | Refills: 0 | Status: SHIPPED | OUTPATIENT
Start: 2025-04-08

## 2025-04-08 NOTE — TELEPHONE ENCOUNTER
04/08/25 8:44 AM    Patient contacted post ED visit, [facility] department spoke with patient/caregiver and outreach was successful.    Thank you.  Natalie Wylie MA  [facility]

## 2025-04-08 NOTE — TELEPHONE ENCOUNTER
Last office note reads:  Continue Seroquel 400 mg and 100 mg (to total 500 mg) at bedtime for mood  Continue Seroquel 300 mg daily for mood   Regarding: Test Results  Contact: [phone]  ----- Message from Lidya Brown sent at 1/18/2024  4:20 PM EST -----       ----- Message from Nicole Montana Debra, APRN - CNP sent at 1/18/2024  4:19 PM -----   Hello,  I noticed some of my test results were abnormal.  Just wondering if I should be worried about anything.    Thanks,  Nicole Montana

## 2025-04-08 NOTE — TELEPHONE ENCOUNTER
Reason for call: Patient state she spoke with [facility] and they do not have an order for lamotrigine- Please resend to [facility]!    [] Refill   [x] Prior Auth  [] Other:     Office:   [] PCP/Provider -   [x] Specialty/Provider -     Medication: lamotrigine     Dose/Frequency: 200mg 1 tablet am and 1 tablet pm     Quantity: 90 day supply     Pharmacy: [facility]     Mail Away Pharmacy   Does the patient have enough for 10 days?   [] Yes   [x] No - Send as HP to POD

## 2025-04-08 NOTE — TELEPHONE ENCOUNTER
Reason for call: Patient state she also use a 300 mg in the morning in addition to the 400 and 100 mg she use at night    300 mg not on active medication list - Please review to see if the refill is appropriate.     [x] Refill   [] Prior Auth  [] Other:     Office:   [] PCP/Provider -   [x] Specialty/Provider -     Medication: Quetiapine      Dose/Frequency: 300 mg - 1 tablet in the morning    Quantity: 90 day supply    Pharmacy: [facility]    Mail Away Pharmacy   Does the patient have enough for 10 days?   [] Yes   [x] No - Send as HP to POD

## 2025-04-08 NOTE — TELEPHONE ENCOUNTER
Reason for call:   [x] Refill   [] Prior Auth  [] Other:     Office:   [x] PCP/Provider -   [] Specialty/Provider -     Medication: clonazePAM (KlonoPIN) 0.5 mg tablet Take 1 tablet (0.5 mg total) by mouth 3 (three) times a day       [facility] [address] [place] [address] Pharmacy   Does the patient have enough for 3 days?   [x] Yes   [] No - Send as HP to POD    Mail Away Pharmacy   Does the patient have enough for 10 days?   [] Yes   [] No - Send as HP to POD

## 2025-04-09 RX ORDER — CLONAZEPAM 0.5 MG/1
0.5 TABLET ORAL 3 TIMES DAILY
Qty: 270 TABLET | Refills: 0 | Status: SHIPPED | OUTPATIENT
Start: 2025-04-09

## 2025-04-09 NOTE — PSYCH
MEDICATION MANAGEMENT NOTE    Name: Ayleen Moralez      : 1960      MRN: [id number]  Encounter Provider: EMERITA Lynn  Encounter Date: 4/15/2025   Encounter department: [facility] OUT[facility]    Insurance: Payor: MEDICARE / Plan: MEDICARE A AND B / Product Type: Medicare A & B Fee for Service /      Reason for Visit: Follow-up for medication management:  Assessment & Plan  Bipolar affective disorder, current episode depressed, current episode severity unspecified (HCC)   - Continue Seroquel 300 mg daily for mood/psychosis   - Continue Seroquel 500 mg daily at bedtime for mood/psychosis/sleep   - Continue Lamictal 200 mg in the morning and 200 mg at night for mood  Orders:    QUEtiapine (SEROquel) 400 MG tablet; Take 1 tablet (400 mg total) by mouth daily at bedtime Take with the Seroquel 100 mg to total 500 mg daily at bedtime    QUEtiapine (SEROquel) 100 mg tablet; Take 1 tablet (100 mg total) by mouth in the morning    Obsessive-compulsive disorder, unspecified type   - Continue Luvox 100 mg in the morning and 100 mg at night for depression, anxiety, and OCD  Orders:    fluvoxaMINE (LUVOX) 100 mg tablet; Fluvoxamine 100m tablet in the morning ; 2 tablets at night    Generalized anxiety disorder   - Continue Luvox 100 mg in the morning and 100 mg at night for depression, anxiety, OCD         Impression:  Bipolar Disorder, depressed with psychotic features  Generalized Anxiety Disorder   OCD     Continue Seroquel 400 mg and 100 mg (to total 500 mg) at bedtime for mood  Continue Seroquel 300 mg daily for mood  Continue Luvox 100 mg (takes 1 in the AM and 2 at night) for depression and anxiety  Continue Lamictal 200 mg (takes 1 in the AM and 1 at night) for mood  Continue Klonopin 0.5 mg TID PRN for anxiety/sleep- (PRESCRIBED BY PCP)  Recommend outpatient therapy-Will continue with Inga Becker at [facility]   Medical follow up with PCP as  needed  Aware of 24 hour and weekend coverage for urgent situations accessed by calling [facility] Outpatient main practice number  Follow up in 3 months      Treatment Plan:  Next treatment plan due 9/4/2025. Next crisis plan due 5/7/2025.     Treatment Recommendations:    Educated about diagnosis and treatment modalities. Verbalizes understanding and agreement with the treatment plan.  Discussed self monitoring of symptoms, and symptom monitoring tools.  Discussed medications and if treatment adjustment was needed or desired.  Aware of 24 hour and weekend coverage for urgent situations accessed by calling [facility] main practice number  I am scheduling this patient out for greater than 3 months: No    Medications Risks/Benefits:      Risks, Benefits And Possible Side Effects Of Medications:    Risks, benefits, and possible side effects of medications explained to Ayleen and she (or legal representative) verbalizes understanding and agreement for treatment.    Controlled Medication Discussion:     Ayleen has been filling controlled prescriptions on time as prescribed according to [place] Prescription Drug Monitoring Program.  Discussed with Ayleen the risks of sedation, respiratory depression, impairment of ability to drive and potential for abuse and addiction related to treatment with benzodiazepine medications. She understands risk of treatment with benzodiazepine medications, agrees to not drive if feels impaired and agrees to take medications as prescribed.  Ayleen is using medication appropriately. Klonopin prescribed by Patient's PCP.       History of Present Illness     HPI   Ayleen is a 64 year old female being seen today for follow-up for medication management.  Patient has psychiatric diagnoses including schizophrenia,  bipolar 1 disorder. Patient is currently being observed on Seroquel 400 mg and 100 mg at  at bedtime, Seroquel 300 mg in the  "morning, Luvox 100 mg  (takes 1 in the morning and 2 at night ), Lamictal 200 mg  (takes 1 in the morning and 1 at night ). Patient is currently connected connected to outpatient therapy with Inga Becker  at [facility]. No additional services in place at this time.     Ayleen reports medication compliance and denies any side effects at this time.  She states she recently had colitis and was in the hospital.  She states her living situation has been okay and her sister and niece are agreeable to have her live with them when they moved.  She reports today she would love to have another dog.  She states her mood is \"okay\".  Sleep and appetite are adequate.  She was happy to report she lost 12 pounds after dieting and feels much better.  Energy levels and motivation are improving.  Ayleen denies SI, HI, and VH.  She does admit to rarely hearing auditory hallucinations of voices that are mumbling.  She reports mild mood swings and irritability but is able to manage these.  Denies any periods of hanh or elevated mood.  Ayleen does not endorse any anxiety today and states sometimes she feels sad because she would like another dog but feels this is manageable.  No medication changes at this time.      Review Of Systems: A review of systems is obtained and is negative except for the pertinent positives listed in HPI/Subjective above.      Current Rating Scores:     None completed today.    Areas of Improvement: reviewed in HPI/Subjective Section and reviewed in Assessment and Plan Section    Past Medical History:   Diagnosis Date    Aftercare following left knee joint replacement surgery 02/15/2024    Anxiety     Anxiety disorder     Arthritis     At risk for falls     Bipolar 2 disorder (HCC)     Chronic back pain     Chronic kidney disease     Closed fracture of distal end of right fibula with routine healing 11/04/2020    COVID-19     in Jan 2021    CVA (cerebral vascular accident) (HCC)     noted on MRI in the " past    Depression     GERD (gastroesophageal reflux disease)     Hypercholesteremia     Hypernatremia     Hypertension     Hypokalemia     Idiopathic chronic pancreatitis (HCC) 2018    Intervertebral disc disorder with radiculopathy of lumbosacral region     resolved: 2015    Limb alert care status     LUE-fistula    Panic attacks     Pericardial effusion     PONV (postoperative nausea and vomiting)     Psychiatric problem     Radiculitis     resolved: 2015    Secondary renal hyperparathyroidism (HCC)     Stroke (HCC)     Vitamin D deficiency      Past Surgical History:   Procedure Laterality Date    BUNIONECTOMY      Left foot     CAST APPLICATION Right 2022    Procedure: Application short-arm thumb spica splint;  Surgeon: Lyndon Victoria MD;  Location: [facility];  Service: Orthopedics    COLON SURGERY      COLONOSCOPY  2018    COLONOSCOPY  2021    DILATION AND CURETTAGE OF UTERUS      INDUCED       surgically induced    AL ARTERIOVENOUS ANASTOMOSIS OPEN DIRECT Left 2019    Procedure: CREATION FISTULA ARTERIOVENOUS (AV) left wrists possible left upper;  Surgeon: Andrey Quintero MD;  Location: [facility];  Service: Vascular    AL ARTHRP KNE CONDYLE&PLATU MEDIAL&LAT COMPARTMENTS Left 2024    Procedure: ARTHROPLASTY KNEE TOTAL SAME DAY;  Surgeon: Riki Ma MD;  Location: [facility];  Service: Orthopedics    AL CORRJ [facility]X [facility] BNCTY SESMDC DSTL METAR OSTEOT Left 2019    Procedure: Serge bunionectomy;  Surgeon: Munir Larkin DPM;  Location: [facility];  Service: Podiatry    AL CORRJ HLX VLGS BNCTY SESMDC DSTL METAR OSTEOT Right 2020    Procedure: BUNIONECTOMY RAFAEL;  Surgeon: Munir Larkin DPM;  Location: [facility];  Service: Podiatry    AL CORR HLX VLGS BNCTY SESMDC PROX PHLX OSTEOT Right 2021    Procedure: BUNIONECTOMY TAMEKA, right tameka osteotomy and 2nd claw toe correction;  Surgeon: James R Lachman, MD;  Location: [facility];   Service: Orthopedics    VA ERCP DX COLLECTION SPECIMEN BRUSHING/WASHING N/A 04/11/2018    Procedure: ENDOSCOPIC RETROGRADE CHOLANGIOPANCREATOGRAPHY (ERCP);  Surgeon: Alfredo Messina MD;  Location: [facility];  Service: Gastroenterology    VA LAPAROSCOPY PROCTOPEXY PROLAPSE N/A 07/13/2018    Procedure: ROBOTIC SIGMOID RESECTION / RECTOPEXY;  Surgeon: ELPIDIO Mcnulty MD;  [facility]: [facility];  Service: Colorectal    VA OPEN TREATMENT RADIAL SHAFT FRACTURE W/INT FIXJ Right 10/11/2021    Procedure: OPEN REDUCTION W/ INTERNAL FIXATION (ORIF) RADIUS (WRIST), RIGHT DISTAL;  Surgeon: Riki Ma MD;  Location: [facility];  Service: Orthopedics    VA REMOVAL IMPLANT DEEP Right 06/23/2022    Procedure: Removal of hardware volar aspect right distal radius (distal radial plate and screws);  Surgeon: Lyndon Victoria MD;  Location: [facility];  Service: Orthopedics    VA SIGMOIDOSCOPY FLX DX W/COLLJ SPEC BR/WA IF PFRMD N/A 07/13/2018    Procedure: SIGMOIDOSCOPY FLEXIBLE;  Surgeon: ELPIDIO Mcnulty MD;  [facility];  Service: Colorectal    VA TR TDN RESTORE INTRNSC FUNCJ ALL 4 FNGRS Right 06/23/2022    Procedure: Right ring finger flexor digitorum superficialis to flexor pollicis longus tendon transfer;  Surgeon: Lyndon Victoria MD;  Location: [facility];  Service: Orthopedics    TUBAL LIGATION Bilateral 1997    US GUIDED THYROID BIOPSY  07/30/2019     Allergies:   Allergies   Allergen Reactions    Molds & Smuts Nasal Congestion    Bee Pollen Nasal Congestion     Other reaction(s): Nasal Congestion    Pollen Extract Nasal Congestion       Current Outpatient Medications   Medication Instructions    acetaminophen (TYLENOL) 1,000 mg, Oral, Every 8 hours    albuterol (Ventolin HFA) 90 mcg/act inhaler 2 puffs, Inhalation, Every 6 hours PRN    amLODIPine (NORVASC) 5 mg, Oral, Daily    ascorbic acid (VITAMIN C) 500 mg, Oral, 2 times daily    aspirin 81 mg, Oral, Daily    budesonide-formoterol (Symbicort) 160-4.5  mcg/act inhaler 2 puffs, Inhalation, 2 times daily, Rinse mouth after use.    calcium carbonate (CALCIUM 600) 600 mg, 2 times daily with meals    carvedilol (COREG) 25 mg, Oral, 2 times daily with meals    cholecalciferol (VITAMIN D3) 5,000 Units, Oral, Daily    clonazePAM (KLONOPIN) 0.5 mg, Oral, 3 times daily    Cyanocobalamin (VITAMIN B 12 PO) 1,000 mcg, Daily    cyclobenzaprine (FLEXERIL) 10 mg, Oral, Daily at bedtime    denosumab (XGEVA) 120 mg, Subcutaneous    dicyclomine (BENTYL) 10 mg, Oral, 4 times daily PRN    ferrous sulfate 324 mg, Oral, 2 times daily before meals    fluticasone (FLONASE) 50 mcg/act nasal spray 2 sprays, Nasal, Daily PRN    fluvoxaMINE (LUVOX) 100 mg tablet Fluvoxamine 100m tablet in the morning ; 2 tablets at night    lamoTRIgine (LaMICtal) 200 MG tablet Lamotrigine 200m tablet in the morning , 1 tablet at night    montelukast (SINGULAIR) 10 mg, Oral, Daily at bedtime    Multiple Vitamin (MULTI-VITAMIN) tablet 1 tablet, Oral, Daily    omeprazole (PRILOSEC) 40 mg, Oral, Daily before breakfast    ondansetron (ZOFRAN) 4 mg, Oral, Every 8 hours PRN    QUEtiapine (SEROQUEL) 300 mg, Oral, Daily    QUEtiapine (SEROQUEL) 400 mg, Oral, Daily at bedtime, Take with the Seroquel 100 mg to total 500 mg daily at bedtime    QUEtiapine (SEROQUEL) 100 mg, Oral, Daily    rosuvastatin (CRESTOR) 10 mg, Oral, Every evening    traMADol (ULTRAM) 50 mg, Oral, 2 times daily PRN        Substance Abuse History:    Tobacco, Alcohol and Drug Use History     Tobacco Use    Smoking status: Never    Smokeless tobacco: Never   Vaping Use    Vaping status: Never Used   Substance Use Topics    Alcohol use: Not Currently    Drug use: Not Currently     Types: Hydrocodone, Marijuana     Comment: JESSE Abbasi has h/o marijuana abuse- not currently     Alcohol Use: Not At Risk (2024)    AUDIT-C     Frequency of Alcohol Consumption: Never     Average Number of Drinks: Patient does not drink       Social  History:    Social History     Socioeconomic History    Marital status:      Spouse name: Not on file    Number of children: Not on file    Years of education: Not on file    Highest education level: Not on file   Occupational History    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   Other Topics Concern    Not on file   Social History Narrative    Daily caffeine consumption 2-3 servings a day    Lives with family.    No living will.     Has dentures---no dental care.     Primary language--English.     Feels safe at home.        Family Psychiatric History:     Family History   Problem Relation Age of Onset    Bipolar disorder Mother     Mental illness Mother         depression    Stroke Mother     Dementia Mother     Colon polyps Mother     Heart disease Father     Hypertension Father     Diabetes Father     Mental illness Sister     Colon polyps Sister     Mental illness Sister     Heart disease Sister     No Known Problems Sister     Breast cancer Sister 68    No Known Problems Maternal Grandmother     No Known Problems Maternal Grandfather     Breast cancer Paternal Grandmother         age unknown    No Known Problems Paternal Grandfather     Hypertension Son     Obesity Son     No Known Problems Son     Breast cancer Maternal Aunt         age unknown    Breast cancer Paternal Aunt         age unknown    Breast cancer Paternal Aunt         age unknown    Diabetes Family     Heart disease Family     Hypertension Family     Stroke Family     Thyroid disease Family     Throat cancer Niece 48    Substance Abuse Neg Hx         neg fam hx    Colon cancer Neg Hx        Medical History Reviewed by provider this encounter:  Tobacco  Allergies  Meds  Problems  Med Hx  Surg Hx  Fam Hx          Objective   LMP  (LMP Unknown)      Mental Status Evaluation:    Appearance age appropriate, casually dressed   Behavior cooperative, calm   Speech normal rate, normal volume, normal pitch, spontaneous   Mood euthymic   Affect normal  range and intensity, appropriate   Thought Processes organized, goal directed   Thought Content no overt delusions   Perceptual Disturbances: vague auditory hallucinations, cannot understand voices   Abnormal Thoughts  Risk Potential Suicidal ideation - None  Homicidal ideation -   , None  Potential for aggression - No   Orientation oriented to person, place, time/date, and situation   Memory recent and remote memory grossly intact   Consciousness alert and awake   Attention Span Concentration Span attention span and concentration are age appropriate   Intellect appears to be of average intelligence   Insight intact   Judgement intact   Muscle Strength and  Gait normal muscle strength and normal muscle tone, normal gait and normal balance   Motor activity no abnormal movements   Language no difficulty naming common objects, no difficulty repeating a phrase, no difficulty writing a sentence   Fund of Knowledge adequate knowledge of current events  adequate fund of knowledge regarding past history  adequate fund of knowledge regarding vocabulary    Pain none   Pain Scale 0       Laboratory Results: I have personally reviewed all pertinent laboratory/tests results    Most Recent Labs:   Lab Results   Component Value Date    WBC 8.85 04/07/2025    RBC 3.67 (L) 04/07/2025    HGB 12.1 04/07/2025    HCT 37.6 04/07/2025     04/07/2025    RDW 12.2 04/07/2025    NEUTROABS 5.63 04/07/2025     02/27/2017    SODIUM 140 04/07/2025    K 3.7 04/07/2025     04/07/2025    CO2 22 04/07/2025    BUN 29 (H) 04/07/2025    CREATININE 2.89 (H) 04/07/2025    CALCIUM 10.5 (H) 04/07/2025    AST 18 04/07/2025    ALT 15 04/07/2025    ALKPHOS 104 04/07/2025    TP 6.8 04/07/2025    TBILI 0.31 04/07/2025    CHOLESTEROL 213 (H) 01/21/2025    TRIG 188 (H) 01/21/2025    HDL 46 (L) 01/21/2025    LDLCALC 129 (H) 01/21/2025    NONHDLC 167 01/21/2025    AMMONIA 17 02/17/2018    [id number] 2.785 10/30/2024    FREET4 0.57 (L) 06/12/2019        Suicide/Homicide Risk Assessment:    Risk of Harm to Self:  The following ratings are based on assessment at the time of the interview  Historical Risk Factors include: chronic psychiatric problems  Protective Factors: no current suicidal ideation, ability to adapt to change, able to make plans for the future, access to mental health treatment, compliant with medications, compliant with mental health treatment    Risk of Harm to Others:  The following ratings are based on assessment at the time of the interview  Historical Risk Factors include: none  Protective Factors: no current homicidal ideation, ability to adapt to change, able to manage anger well, access to mental health treatment, compliant with medications, compliant with mental health treatment    The following interventions are recommended: Continue medication management. No other intervention changes indicated at this time.    Psychotherapy Provided:     Individual psychotherapy provided: No    Treatment Plan:    Completed and signed during the session: Not applicable - Treatment Plan to be completed by [facility] therapist.    Goals: Progress towards Treatment Plan goals - Yes, progressing, as evidenced by subjective findings in HPI/Subjective Section and in Assessment and Plan Section    Depression Follow-up Plan Completed: Yes    Note Share:    This note was not shared with the patient due to reasonable likelihood of causing patient harm          Visit Time  Visit Start Time: 11:00 AM  Visit Stop Time: 11:23 AM  Total Visit Duration:  23 minutes    EMERITA Lynn 04/15/25

## 2025-04-14 ENCOUNTER — SOCIAL WORK (OUTPATIENT)
Dept: BEHAVIORAL/MENTAL HEALTH CLINIC | Facility: CLINIC | Age: 65
End: 2025-04-14
Payer: MEDICARE

## 2025-04-14 ENCOUNTER — OFFICE VISIT (OUTPATIENT)
Dept: CARDIOLOGY CLINIC | Facility: CLINIC | Age: 65
End: 2025-04-14
Payer: MEDICARE

## 2025-04-14 VITALS
HEIGHT: 67 IN | BODY MASS INDEX: 30.45 KG/M2 | HEART RATE: 82 BPM | DIASTOLIC BLOOD PRESSURE: 68 MMHG | SYSTOLIC BLOOD PRESSURE: 138 MMHG | WEIGHT: 194 LBS

## 2025-04-14 DIAGNOSIS — I63.81 LACUNAR INFARCTION (HCC): ICD-10-CM

## 2025-04-14 DIAGNOSIS — F41.9 ANXIETY DISORDER, UNSPECIFIED TYPE: ICD-10-CM

## 2025-04-14 DIAGNOSIS — I35.0 MODERATE AORTIC STENOSIS: Primary | ICD-10-CM

## 2025-04-14 DIAGNOSIS — N18.6 END STAGE RENAL DISEASE (HCC): ICD-10-CM

## 2025-04-14 DIAGNOSIS — E78.00 HYPERCHOLESTEREMIA: ICD-10-CM

## 2025-04-14 DIAGNOSIS — F50.9 EATING DISORDER, UNSPECIFIED TYPE: ICD-10-CM

## 2025-04-14 DIAGNOSIS — F31.32 BIPOLAR I DISORDER, MOST RECENT EPISODE DEPRESSED, MODERATE (HCC): Primary | ICD-10-CM

## 2025-04-14 DIAGNOSIS — I10 ESSENTIAL HYPERTENSION: ICD-10-CM

## 2025-04-14 PROCEDURE — 90837 PSYTX W PT 60 MINUTES: CPT | Performed by: COUNSELOR

## 2025-04-14 PROCEDURE — 99213 OFFICE O/P EST LOW 20 MIN: CPT

## 2025-04-14 NOTE — PROGRESS NOTES
Cardiology Follow Up    Ayleen Moralez  1960  [id number]  [facility]  [facility] [address]  [place] [address] [phone]    1. Moderate aortic stenosis        2. Essential hypertension        3. Hypercholesteremia        4. End stage renal disease (HCC)        5. Lacunar infarction (HCC)        Discussion/Summary:  1.)  Aortic stenosis  TTE 3/10/2025: EF 65%, normal wall motion, normal diastolic function, mild LA dilatation, mild to moderate aortic stenosis with mean gradient 18 mmHg, mild MR, mild TR, mild ND  Nuclear medicine stress test 2/27/2025: Normal, normal perfusion, normal LV function  I reviewed the above testing with the patient.  She denies lightheadedness, dizziness, syncopal or presyncopal episodes.  She also denies shortness of breath except with exertional activities, like climbing multiple flights of stairs.  Continue to follow with echocardiograms as necessary.    2.) Hypertension  Patient's hypertension treated with amlodipine 5 mg daily, carvedilol 25 mg twice daily..  blood pressure in the office and at previous visits excellent.    continue all above medications    3.) Hypercholesteremia  Treated with rosuvastatin 10 mg daily - last lipid profile with total cholesterol of 213 triglycerides of 193,  HDL 72.  Continue rosuvastatin  Will update lipid profile    4) ESRD  Patient undergoing workup for consideration of renal transplant, has a fistula in place but not currently on HD.  She is unsure if she is listed for transplant. Her amiloride is on hold at this time with normal sodium at last lab draw  Continue to follow with nephrology    6. Lacunar infarct   Incidental finding on MRI -patient followed by neurology.  Takes a baby aspirin daily.  Asymptomatic.    Will follow-up with Dr. Garcia in approximately 6 months or sooner if necessary    Interval History:   Ayleen Moralez  is a 64 y.o. female with past medical history of end-stage renal disease, hypertension, hypercholesterolemia, aortic stenosis, chronic pancreatitis, bipolar disorder here for routine follow-up.  She is a patient of Dr. Garcia, but was last seen in the office by Dr. Winslow on 2/27/2025 for preoperative clearance for renal transplant.  Since her last visit she has been to the emergency room twice.  On 3/22 she presented with headache/neck pain MRI/MRA/CT completed at that visit with reports below.  MRI brain demonstrating stable chronic cystic left basal ganglia lacunar infarct.  On 4/02 she presented to with colitis - she is still experiencing diarrhea but overall feels better.     I reviewed Ayleen's stress test and echocardiogram with her.  She continues to follow with nephrology and the transplant team at [facility].  She is unsure whether or not she is currently listed for renal transplant but she follows back up with them in June.  She denies lightheadedness, dizziness, syncopal or presyncopal episodes.  She does endorse some occasional left-sided chest wall discomfort patient states that pain occurs rare lasting for approximately 15 minutes, is sometimes associated with anxiety and sometimes associated with positional changes.  This pain resolves without any intervention.        Medical Problems       Problem List       Pure hypercholesterolemia, unspecified    Overview Signed 3/20/2018  9:51 AM by Bette Harp DO   Dx age 52       Spondylolisthesis at L5-S1 level    Renal cyst    Vitamin D deficiency, unspecified    Secondary hyperparathyroidism of renal origin (HCC)    Herniated nucleus pulposus, L5-S1    Other chronic pancreatitis (HCC)    Primary osteoarthritis of right knee    Overview Signed 3/20/2018  9:45 AM by Bette Harp DO   Work injury 2013 approx- seen at [place]       Age-related osteoporosis without current pathological fracture    Overview Addendum 1/2/2019 10:40 AM by Bette Harp DO   Had left ankle  fracture age 55   clavicle fracture as child- skateboard   had dexascan dec 2017       Rectal prolapse    Overview Signed 7/30/2018 10:31 AM by Bette Harp DO   Surgery done July 2018- Dr. Simon       Elevated LFTs    Essential (primary) hypertension    Hyperprolactinemia (HCC)    Overview Signed 1/2/2019 10:36 AM by Bette Harp DO   Was dx in approx in her 30's with amenorrhea- had pitiutary ? Cyst- last mri over 10 years ago in [facility]       Obsessive-compulsive disorder, unspecified    Overview Signed 1/23/2019  9:13 AM by Bette Harp DO   [facility] 11/2018       Panic disorder (episodic paroxysmal anxiety)    Eating disorder    Hyperparathyroidism, unspecified (HCC)    Other pericardial effusion (noninflammatory)    Benign neoplasm of colon    Drug-induced constipation    Cerebral infarction, unspecified (HCC)    Contusion of right knee    Abnormal chest CT    Hyperphosphatemia    Abnormal thyroid exam    Thyroid nodule    Moderate persistent asthma, uncomplicated    Stage 4 chronic kidney disease (HCC)    Lab Results   Component Value Date    EGFR 16 04/07/2025    EGFR 20 03/22/2025    EGFR 17 03/17/2025    CREATININE 2.89 (H) 04/07/2025    CREATININE 2.48 (H) 03/22/2025    CREATININE 2.75 (H) 03/17/2025         Unilateral primary osteoarthritis, left knee    Steal syndrome dialysis vascular access (HCC)    Fistula, left hand    Right patellofemoral syndrome    Bilateral sacroiliitis (HCC)    Hallux valgus, right    Acquired hallux interphalangeus of right foot    Effusion of right knee    Left knee tendonitis    Family history of cardiac disorder in father    Claw toe, acquired, right    Injury of plantar plate, right, initial encounter    Acquired hallux interphalangeus, right    Closed Colles' fracture of right radius    Aftercare following surgery of the musculoskeletal system    Acute shoulder pain    Gastro-esophageal reflux disease without esophagitis    Injury of right thumb     Pain of right upper extremity    Mixed obsessional thoughts and acts    Eating disorder, unspecified    Other chronic pain    Pes anserinus bursitis of both knees    Umbilical hernia    Obesity (BMI 30-39.9)    Concussion with no loss of consciousness    Interstitial pulmonary disease, unspecified ([facility])    Statin intolerance    Moderate aortic stenosis    Anxiety disorder, unspecified    Primary localized osteoarthritis of knees, bilateral    Aftercare following left knee joint replacement surgery    Contusion of buttock    SPENCER (obstructive sleep apnea)    Other bursitis of knee, unspecified knee    Difficulty in walking, not elsewhere classified    Generalized muscle weakness    History of falling    Bipolar disorder, current episode manic severe with psychotic features ([facility])    Hyperosmolality and hypernatremia    Intervertebral disc disorders with radiculopathy, lumbar region    Low back pain, unspecified    Major depressive disorder, single episode, unspecified    Presence of left artificial knee joint    Contusion of left knee    History of arthroplasty of left knee    Right foot drop    Opioid dependence, uncomplicated ([facility])    Diarrhea    Bipolar I disorder, most recent episode depressed, moderate ([facility])    Left-sided weakness        Past Medical History:   Diagnosis Date    Aftercare following left knee joint replacement surgery 02/15/2024    Anxiety     Anxiety disorder     Arthritis     At risk for falls     Bipolar 2 disorder ([facility])     Chronic back pain     Chronic kidney disease     Closed fracture of distal end of right fibula with routine healing 11/04/2020    COVID-19     in Jan 2021    CVA (cerebral vascular accident) ([facility])     noted on MRI in the past    Depression     GERD (gastroesophageal reflux disease)     Hypercholesteremia     Hypernatremia     Hypertension     Hypokalemia     Idiopathic chronic pancreatitis ([facility]) 03/20/2018    Intervertebral disc disorder with radiculopathy of lumbosacral  region     resolved: 12/28/2015    Limb alert care status     LUE-fistula    Panic attacks     Pericardial effusion     PONV (postoperative nausea and vomiting)     Psychiatric problem     Radiculitis     resolved: 12/28/2015    Secondary renal hyperparathyroidism (HCC)     Stroke (HCC)     Vitamin D deficiency      Social History     Socioeconomic History    Marital status:      Spouse name: Not on file    Number of children: Not on file    Years of education: Not on file    Highest education level: Not on file   Occupational History    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   Tobacco Use    Smoking status: Never    Smokeless tobacco: Never   Vaping Use    Vaping status: Never Used   Substance and Sexual Activity    Alcohol use: Not Currently    Drug use: Not Currently     Types: Hydrocodone, Marijuana     Comment: MEDICATION   Ayleen has h/o marijuana abuse- not currently    Sexual activity: Not Currently     Partners: Male     Birth control/protection: Post-menopausal   Other Topics Concern    Not on file   Social History Narrative    Daily caffeine consumption 2-3 servings a day    Lives with family.    No living will.     Has dentures---no dental care.     Primary language--English.     Feels safe at home.     Social Drivers of Health     Financial Resource Strain: Medium Risk (12/7/2022)    Overall Financial Resource Strain (CARDIA)     Difficulty of Paying Living Expenses: Somewhat hard   Food Insecurity: Patient Declined (2/17/2025)    Nursing - Inadequate Food Risk Classification     Worried About Running Out of Food in the Last Year: Never true     Ran Out of Food in the Last Year: Never true     Ran Out of Food in the Last Year: Patient declined   Transportation Needs: Patient Declined (2/17/2025)    Nursing - Transportation Risk Classification     Lack of Transportation: Not on file     Lack of Transportation: Patient declined   Physical Activity: Inactive (4/19/2021)    Exercise Vital Sign     Days of  Exercise per Week: 0 days     Minutes of Exercise per Session: 0 min   Stress: Stress Concern Present (2021)    [employer] - Occupational Stress Questionnaire     Feeling of Stress : To some extent   Social Connections: Not on file   Intimate Partner Violence: Patient Declined (2025)    Nursing IPS     Feels Physically and Emotionally Safe: Not on file     Physically Hurt by Someone: Not on file     Humiliated or Emotionally Abused by Someone: Not on file     Physically Hurt by Someone: Patient declined     Hurt or Threatened by Someone: Patient declined   Housing Stability: Patient Declined (2025)    Nursing: Inadequate Housing Risk Classification     Has Housing: Not on file     Worried About Losing Housing: Not on file     Unable to Get Utilities: Not on file     Unable to Pay for Housing in the Last Year: Patient declined     Has Housin      Family History   Problem Relation Age of Onset    Bipolar disorder Mother     Mental illness Mother         depression    Stroke Mother     Dementia Mother     Colon polyps Mother     Heart disease Father     Hypertension Father     Diabetes Father     Mental illness Sister     Colon polyps Sister     Mental illness Sister     Heart disease Sister     No Known Problems Sister     Breast cancer Sister 68    No Known Problems Maternal Grandmother     No Known Problems Maternal Grandfather     Breast cancer Paternal Grandmother         age unknown    No Known Problems Paternal Grandfather     Hypertension Son     Obesity Son     No Known Problems Son     Breast cancer Maternal Aunt         age unknown    Breast cancer Paternal Aunt         age unknown    Breast cancer Paternal Aunt         age unknown    Diabetes Family     Heart disease Family     Hypertension Family     Stroke Family     Thyroid disease Family     Throat cancer Niece 48    Substance Abuse Neg Hx         neg fam hx    Colon cancer Neg Hx      Past Surgical  History:   Procedure Laterality Date    BUNIONECTOMY      Left foot     CAST APPLICATION Right 2022    Procedure: Application short-arm thumb spica splint;  Surgeon: Lyndon Victoria MD;  Location: [facility];  Service: Orthopedics    COLON SURGERY      COLONOSCOPY  2018    COLONOSCOPY  2021    DILATION AND CURETTAGE OF UTERUS      INDUCED       surgically induced    VT ARTERIOVENOUS ANASTOMOSIS OPEN DIRECT Left 2019    Procedure: CREATION FISTULA ARTERIOVENOUS (AV) left wrists possible left upper;  Surgeon: Andrey Quintero MD;  Location: [facility];  Service: Vascular    VT ARTHRP KNE CONDYLE&PLATU MEDIAL&LAT COMPARTMENTS Left 2024    Procedure: ARTHROPLASTY KNEE TOTAL SAME DAY;  Surgeon: Riki Ma MD;  Location: [facility];  Service: Orthopedics    VT CORRJ HLX VLGS BNCTY SESMDC DSTL METAR OSTEOT Left 2019    Procedure: Serge bunionectomy;  Surgeon: Munir Larkin DPM;  Location: [facility];  Service: Podiatry    VT CORRJ HLX VLGS BNCTY SESMDC DSTL METAR OSTEOT Right 2020    Procedure: BUNIONECTOMY RAFAEL;  Surgeon: Munir Larkin DPM;  Location: [facility];  Service: Podiatry    VT CORRJ HLX VLGS BNCTY SESMDC PROX PHLX OSTEOT Right 2021    Procedure: BUNIONECTOMY TAMEKA, right tameka osteotomy and 2nd claw toe correction;  Surgeon: James R Lachman, MD;  Location: [facility];  Service: Orthopedics    VT ERCP DX COLLECTION SPECIMEN BRUSHING/WASHING N/A 2018    Procedure: ENDOSCOPIC RETROGRADE CHOLANGIOPANCREATOGRAPHY (ERCP);  Surgeon: Alfredo Messina MD;  Location: [facility];  Service: Gastroenterology    VT LAPAROSCOPY PROCTOPEXY PROLAPSE N/A 2018    Procedure: ROBOTIC SIGMOID RESECTION / RECTOPEXY;  Surgeon: ELPIDIO Mcnulty MD;  Location: [facility];  Service: Colorectal    VT OPEN TREATMENT RADIAL SHAFT FRACTURE W/INT FIXJ Right 10/11/2021    Procedure: OPEN REDUCTION W/ INTERNAL FIXATION (ORIF) RADIUS (WRIST), RIGHT DISTAL;  Surgeon:  Riki Ma MD;  Location: [facility];  Service: Orthopedics    MS REMOVAL IMPLANT DEEP Right 06/23/2022    Procedure: Removal of hardware volar aspect right distal radius (distal radial plate and screws);  Surgeon: Lyndon Victoria MD;  Location: [facility];  Service: Orthopedics    MS SIGMOIDOSCOPY FLX DX W/COLLJ SPEC BR/WA IF PFRMD N/A 07/13/2018    Procedure: SIGMOIDOSCOPY FLEXIBLE;  Surgeon: ELPIDIO Mcnulty MD;  [facility];  Service: Colorectal    MS TR TDN RESTORE INTRNSC FUNCJ ALL 4 FNGRS Right 06/23/2022    Procedure: Right ring finger flexor digitorum superficialis to flexor pollicis longus tendon transfer;  Surgeon: Lyndon Victoria MD;  Location: [facility];  Service: Orthopedics    TUBAL LIGATION Bilateral 1997    US GUIDED THYROID BIOPSY  07/30/2019       Current Outpatient Medications:     acetaminophen (TYLENOL) 500 mg tablet, Take 2 tablets (1,000 mg total) by mouth every 8 (eight) hours, Disp: 60 tablet, Rfl: 0    albuterol (Ventolin HFA) 90 mcg/act inhaler, Inhale 2 puffs every 6 (six) hours as needed for wheezing or shortness of breath, Disp: 8.5 g, Rfl: 3    amLODIPine (NORVASC) 5 mg tablet, Take 1 tablet (5 mg total) by mouth daily, Disp: 90 tablet, Rfl: 3    ascorbic acid (VITAMIN C) 500 MG tablet, Take 1 tablet (500 mg total) by mouth 2 (two) times a day, Disp: 60 tablet, Rfl: 2    aspirin 81 mg chewable tablet, Chew 1 tablet (81 mg total) daily, Disp: 60 tablet, Rfl: 0    budesonide-formoterol (Symbicort) 160-4.5 mcg/act inhaler, Inhale 2 puffs 2 (two) times a day Rinse mouth after use., Disp: 10.2 g, Rfl: 11    calcium carbonate (Calcium 600) 600 MG tablet, Take 600 mg by mouth 2 (two) times a day with meals, Disp: , Rfl:     carvedilol (COREG) 25 mg tablet, TAKE 1 TABLET BY MOUTH TWICE  DAILY WITH MEALS, Disp: 180 tablet, Rfl: 1    cholecalciferol (VITAMIN D3) 1,000 units tablet, Take 5 tablets (5,000 Units total) by mouth daily, Disp: 90 tablet, Rfl: 5    clonazePAM  (KlonoPIN) 0.5 mg tablet, Take 1 tablet (0.5 mg total) by mouth 3 (three) times a day, Disp: 270 tablet, Rfl: 0    Cyanocobalamin (VITAMIN B 12 PO), Take 1,000 mcg by mouth in the morning, Disp: , Rfl:     cyclobenzaprine (FLEXERIL) 10 mg tablet, Take 1 tablet (10 mg total) by mouth daily at bedtime, Disp: 30 tablet, Rfl: 0    denosumab (XGEVA) 120 mg/1.7 mL, Inject 120 mg under the skin, Disp: , Rfl:     dicyclomine (BENTYL) 10 mg capsule, Take 1 capsule (10 mg total) by mouth 4 (four) times a day as needed (abdominal cramping), Disp: 30 capsule, Rfl: 0    ferrous sulfate 324 (65 Fe) mg, Take 1 tablet (324 mg total) by mouth 2 (two) times a day before meals, Disp: 60 tablet, Rfl: 2    fluticasone (FLONASE) 50 mcg/act nasal spray, 2 sprays into each nostril daily as needed for rhinitis (congestion), Disp: 15.8 mL, Rfl: 0    fluvoxaMINE (LUVOX) 100 mg tablet, Fluvoxamine 100m tablet in the morning ; 2 tablets at night, Disp: 270 tablet, Rfl: 2    lamoTRIgine (LaMICtal) 200 MG tablet, Lamotrigine 200m tablet in the morning , 1 tablet at night, Disp: 180 tablet, Rfl: 0    montelukast (SINGULAIR) 10 mg tablet, Take 1 tablet (10 mg total) by mouth daily at bedtime, Disp: 30 tablet, Rfl: 5    Multiple Vitamin (MULTI-VITAMIN) tablet, Take 1 tablet by mouth daily, Disp: 30 tablet, Rfl: 2    omeprazole (PriLOSEC) 40 MG capsule, TAKE 1 CAPSULE BY MOUTH DAILY  BEFORE BREAKFAST, Disp: 90 capsule, Rfl: 3    ondansetron (ZOFRAN) 4 mg tablet, Take 1 tablet (4 mg total) by mouth every 8 (eight) hours as needed for nausea or vomiting, Disp: 30 tablet, Rfl: 5    QUEtiapine (SEROquel) 100 mg tablet, Take 1 tablet (100 mg total) by mouth in the morning, Disp: 90 tablet, Rfl: 3    QUEtiapine (SEROquel) 300 mg tablet, Take 1 tablet (300 mg total) by mouth in the morning, Disp: 90 tablet, Rfl: 0    QUEtiapine (SEROquel) 400 MG tablet, Take 1 tablet (400 mg total) by mouth daily at bedtime Take with the Seroquel 100 mg to total 500  "mg daily at bedtime, Disp: 90 tablet, Rfl: 3    rosuvastatin (CRESTOR) 10 MG tablet, Take 1 tablet (10 mg total) by mouth every evening, Disp: 90 tablet, Rfl: 3    traMADol (ULTRAM) 50 mg tablet, Take 1 tablet (50 mg total) by mouth 2 (two) times a day as needed for moderate pain, Disp: 60 tablet, Rfl: 0  Allergies   Allergen Reactions    Molds & Smuts Nasal Congestion    Bee Pollen Nasal Congestion     Other reaction(s): Nasal Congestion    Pollen Extract Nasal Congestion       Labs:     Chemistry        Component Value Date/Time     02/27/2017 0758    K 3.7 04/07/2025 0952    K 4.7 02/27/2017 0758     04/07/2025 0952     02/27/2017 0758    CO2 22 04/07/2025 0952    CO2 28 02/27/2017 0758    BUN 29 (H) 04/07/2025 0952    BUN 36 (H) 02/27/2017 0758    CREATININE 2.89 (H) 04/07/2025 0952    CREATININE 1.86 (H) 02/27/2017 0758        Component Value Date/Time    CALCIUM 10.5 (H) 04/07/2025 0952    CALCIUM 9.6 02/27/2017 0758    CALCIUM 9.6 02/27/2017 0758    ALKPHOS 104 04/07/2025 0952    AST 18 04/07/2025 0952    ALT 15 04/07/2025 0952            No results found for: \"CHOL\"  Lab Results   Component Value Date    HDL 46 (L) 01/21/2025    HDL 49 (L) 05/06/2024    HDL 48 (L) 08/31/2022     Lab Results   Component Value Date    LDLCALC 129 (H) 01/21/2025    LDLCALC 104 (H) 05/06/2024    LDLCALC 143 (H) 08/31/2022     Lab Results   Component Value Date    TRIG 188 (H) 01/21/2025    TRIG 211 (H) 05/06/2024    TRIG 358 (H) 08/31/2022     Imaging:   CT abdomen pelvis wo contrast  Result Date: 4/7/2025  Impression: Inflammatory changes around the splenic flexure are suspicious for infectious or inflammatory colitis.  An additional differential consideration is ischemic colitis, but this is favored to be unlikely. Workstation performed: [id number]     XR chest 2 views  Result Date: 3/22/2025  Impression: No acute cardiopulmonary disease. Workstation performed: [id number]     MRI brain wo contrast  Result " Date: 3/22/2025  Impression: No acute intracranial pathology. Stable chronic microangiopathy and chronic left basal ganglia lacunar infarct. Workstation performed: [id number]     MRA carotids wo contrast  Result Date: 3/22/2025  Impression: No significant stenosis. Workstation performed: [id number]     MRA head wo contrast  Result Date: 3/22/2025  Impression: Somewhat limited by motion. No large vessel occlusion. No definite high-grade intracranial stenosis. No aneurysm seen but evaluation for small aneurysms is limited. Workstation performed: [id number]     CT chest abdomen pelvis wo contrast  Result Date: 3/22/2025  Impression: 1.  No acute findings throughout the chest, abdomen or pelvis. 2.  Punctate right lower renal pole nonobstructive calculi without additional urinary calculi. Workstation performed: [id number]     CT spine cervical without contrast  Result Date: 3/22/2025  Impression: No cervical spine fracture or traumatic malalignment. Workstation performed: [id number]     CT head without contrast  Result Date: 3/22/2025  Impression: No acute intracranial abnormality.  Chronic microangiopathic changes. Workstation performed: [id number]     XR knee 3 vw left non injury  Result Date: 3/20/2025  Impression: Satisfactory left knee arthroplasty as above, unchanged from 10/30/2024. Anatomic alignment without loosening. Moderate effusion. No fracture Electronically signed: 03/20/2025 05:25 PM Ross Cox MD    Review of Systems   Constitutional: Negative for decreased appetite, fever, malaise/fatigue, weight gain and weight loss.   Cardiovascular:  Positive for chest pain and dyspnea on exertion. Negative for irregular heartbeat, leg swelling, near-syncope, orthopnea, palpitations, paroxysmal nocturnal dyspnea and syncope.   Respiratory:  Negative for cough and sleep disturbances due to breathing.    Genitourinary:  Negative for hematuria.   Neurological:  Positive for headaches. Negative for dizziness, focal  "weakness, light-headedness and weakness.       Vitals:    04/14/25 1452   BP: 138/68   Pulse: 82     Vitals:    04/14/25 1452   Weight: 88 kg (194 lb)     Height: 5' 7\" (170.2 cm)   Body mass index is 30.38 kg/m².    Physical Exam:  Physical Exam  Constitutional:       Appearance: Normal appearance. She is obese.   HENT:      Head: Normocephalic and atraumatic.      Nose: Nose normal.      Mouth/Throat:      Mouth: Mucous membranes are moist.   Neck:      Vascular: No carotid bruit.   Cardiovascular:      Rate and Rhythm: Normal rate and regular rhythm.      Pulses: Normal pulses.      Heart sounds: Murmur heard.   Pulmonary:      Effort: Pulmonary effort is normal.      Breath sounds: Normal breath sounds.   Abdominal:      General: Bowel sounds are normal.      Palpations: Abdomen is soft.   Musculoskeletal:      Cervical back: Normal range of motion.      Right lower leg: No edema.      Left lower leg: No edema.   Skin:     General: Skin is warm and dry.      Capillary Refill: Capillary refill takes less than 2 seconds.   Neurological:      General: No focal deficit present.      Mental Status: She is alert and oriented to person, place, and time. Mental status is at baseline.   Psychiatric:         Mood and Affect: Mood normal.         Behavior: Behavior normal.         Thought Content: Thought content normal.         "

## 2025-04-14 NOTE — PSYCH
Behavioral Health Psychotherapy Progress Note    Psychotherapy Provided: Individual Psychotherapy     1. Bipolar I disorder, most recent episode depressed, moderate (HCC)        2. Anxiety disorder, unspecified type        3. Eating disorder, unspecified type            Goals addressed in session: Goal 1 and Goal 2     DATA:     Client shared about current events and status.  Reviewed treatment goals.  Explored any barriers to change and how to address them to move forward. Session focused on Ayleen's on-going stress around health issues, housing and family relationships.  Ct. Given number for [facility] and number for [facility] Voucher registration.  Ct. Will call.  She refused referral to peer support or case mgmt.  Ct. Is working to f/u with her medical issues and doctors.  Worked on building confidence to use GPS on her cell phone.  Ct. Is insecure re: trying something new and feeling confident.  Worked on how can start and practice skill.  Celebrated successes and growth where noted.  Continue to process feelings, build insights and reinforce coping skills.  During this session, this clinician used the following therapeutic modalities: Cognitive Behavioral Therapy, Dialectical Behavior Therapy, Mindfulness-based Strategies, Motivational Interviewing, and Supportive Psychotherapy    Substance Abuse was not addressed during this session. If the client is diagnosed with a co-occurring substance use disorder, please indicate any changes in the frequency or amount of use: NA. Stage of change for addressing substance use diagnoses: No substance use/Not applicable    ASSESSMENT:  Ayleen Moralez presents with a  stressed  mood. her affect is Normal range and intensity, which is congruent, with her mood and the content of the session. The client has made progress on their goals.  Ayleen Moralez presents with a none risk of suicide, none risk of self-harm, and none risk of harm to others.   "Client remains engaged and participative in session.  Client is appreciative of treatment support.      For any risk assessment that surpasses a \"low\" rating, a safety plan must be developed.    A safety plan was indicated: no  If yes, describe in detail NA    PLAN: Between sessions, Ayleen Moralez will continue to work on above issues and treatment goals. . At the next session, the therapist will use Cognitive Behavioral Therapy, Dialectical Behavior Therapy, Mindfulness-based Strategies, Motivational Interviewing, and Supportive Psychotherapy to address same.  RS for 2 weeks.    Behavioral Health Treatment Plan and Discharge Planning: Ayleen Moralez is aware of and agrees to continue to work on their treatment plan. They have identified and are working toward their discharge goals. yes      Depression Follow-up Plan Completed: Yes  Ct will continue with treatment plan, including regular counseling, and medication management visits as needed.  Ct will access warm lines or crisis supports that have been given, when needed.     Visit start and stop times:    04/14/25  Start Time: 1204  Stop Time: 1302  Total Visit Time: 58 minutes  "

## 2025-04-15 ENCOUNTER — OFFICE VISIT (OUTPATIENT)
Dept: PSYCHIATRY | Facility: CLINIC | Age: 65
End: 2025-04-15
Payer: MEDICARE

## 2025-04-15 DIAGNOSIS — F31.30 BIPOLAR AFFECTIVE DISORDER, CURRENT EPISODE DEPRESSED, CURRENT EPISODE SEVERITY UNSPECIFIED (HCC): ICD-10-CM

## 2025-04-15 DIAGNOSIS — F41.1 GENERALIZED ANXIETY DISORDER: ICD-10-CM

## 2025-04-15 DIAGNOSIS — F31.2 BIPOLAR DISORDER, CURRENT EPISODE MANIC SEVERE WITH PSYCHOTIC FEATURES (HCC): Primary | ICD-10-CM

## 2025-04-15 DIAGNOSIS — F42.9 OBSESSIVE-COMPULSIVE DISORDER, UNSPECIFIED TYPE: ICD-10-CM

## 2025-04-15 PROBLEM — Z47.33 ENCOUNTER FOR INSERTION OF PROSTHETIC KNEE AFTER PRIOR REMOVAL OF KNEE PROSTHESIS: Status: ACTIVE | Noted: 2024-02-08

## 2025-04-15 PROBLEM — F39 MOOD DISORDER (HCC): Status: RESOLVED | Noted: 2023-11-14 | Resolved: 2025-04-15

## 2025-04-15 PROBLEM — F39 MOOD DISORDER (HCC): Status: ACTIVE | Noted: 2023-11-14

## 2025-04-15 PROBLEM — M62.81 MUSCLE WEAKNESS: Status: ACTIVE | Noted: 2023-11-14

## 2025-04-15 PROBLEM — E66.01 MORBID OBESITY (HCC): Status: ACTIVE | Noted: 2023-11-14

## 2025-04-15 PROBLEM — N18.6 END-STAGE RENAL DISEASE (HCC): Status: ACTIVE | Noted: 2023-11-14

## 2025-04-15 PROBLEM — Z01.818 ENCOUNTER FOR PRE-TRANSPLANT EVALUATION FOR KIDNEY TRANSPLANT: Status: ACTIVE | Noted: 2025-02-28

## 2025-04-15 PROBLEM — I10 ESSENTIAL HYPERTENSION: Status: ACTIVE | Noted: 2023-11-14

## 2025-04-15 PROBLEM — F32.9 MAJOR DEPRESSION, SINGLE EPISODE: Status: RESOLVED | Noted: 2024-02-08 | Resolved: 2025-04-15

## 2025-04-15 PROBLEM — E21.3 HYPERPARATHYROIDISM (HCC): Status: ACTIVE | Noted: 2023-11-14

## 2025-04-15 PROBLEM — E87.6 HYPOKALEMIA: Status: ACTIVE | Noted: 2024-02-08

## 2025-04-15 PROBLEM — M54.50 LOW BACK PAIN: Status: ACTIVE | Noted: 2024-02-08

## 2025-04-15 PROBLEM — K86.1 CHRONIC PANCREATITIS (HCC): Status: ACTIVE | Noted: 2024-02-08

## 2025-04-15 PROBLEM — M54.9 BACKACHE: Status: ACTIVE | Noted: 2023-11-14

## 2025-04-15 PROBLEM — F32.9 MAJOR DEPRESSION, SINGLE EPISODE: Status: ACTIVE | Noted: 2024-02-08

## 2025-04-15 PROBLEM — G89.29 CHRONIC PAIN: Status: ACTIVE | Noted: 2023-11-14

## 2025-04-15 PROBLEM — K21.9 GASTROESOPHAGEAL REFLUX DISEASE WITHOUT ESOPHAGITIS: Status: ACTIVE | Noted: 2023-11-14

## 2025-04-15 PROBLEM — F42.8 OBSESSIONAL THOUGHTS: Status: ACTIVE | Noted: 2023-11-14

## 2025-04-15 PROBLEM — M17.9 OSTEOARTHRITIS OF KNEE: Status: ACTIVE | Noted: 2024-02-08

## 2025-04-15 PROBLEM — J45.40 MODERATE PERSISTENT ASTHMA WITHOUT COMPLICATION: Status: ACTIVE | Noted: 2023-11-14

## 2025-04-15 PROBLEM — I31.39 NONINFLAMMATORY PERICARDIAL EFFUSION: Status: ACTIVE | Noted: 2024-02-08

## 2025-04-15 PROBLEM — F41.0 PANIC DISORDER: Status: ACTIVE | Noted: 2024-02-08

## 2025-04-15 PROBLEM — I35.0 AORTIC VALVE STENOSIS: Status: ACTIVE | Noted: 2023-11-14

## 2025-04-15 PROBLEM — F41.9 ANXIETY DISORDER: Status: ACTIVE | Noted: 2023-11-14

## 2025-04-15 PROBLEM — E55.9 VITAMIN D DEFICIENCY: Status: ACTIVE | Noted: 2024-02-08

## 2025-04-15 PROBLEM — E78.00 PURE HYPERCHOLESTEROLEMIA: Status: ACTIVE | Noted: 2023-11-14

## 2025-04-15 PROBLEM — J84.9 INTERSTITIAL LUNG DISEASE (HCC): Status: ACTIVE | Noted: 2023-11-14

## 2025-04-15 PROBLEM — I63.9 CEREBRAL INFARCTION (HCC): Status: ACTIVE | Noted: 2024-02-08

## 2025-04-15 PROBLEM — M70.50 BURSITIS OF KNEE: Status: ACTIVE | Noted: 2023-11-14

## 2025-04-15 PROBLEM — R26.2 DIFFICULTY WALKING: Status: ACTIVE | Noted: 2023-11-14

## 2025-04-15 PROBLEM — F11.20 OPIOID DEPENDENCE (HCC): Status: ACTIVE | Noted: 2023-11-14

## 2025-04-15 PROBLEM — N25.81 HYPERPARATHYROIDISM DUE TO RENAL INSUFFICIENCY (HCC): Status: ACTIVE | Noted: 2024-02-08

## 2025-04-15 PROCEDURE — 99214 OFFICE O/P EST MOD 30 MIN: CPT

## 2025-04-15 RX ORDER — QUETIAPINE FUMARATE 100 MG/1
100 TABLET, FILM COATED ORAL DAILY
Qty: 90 TABLET | Refills: 3 | Status: SHIPPED | OUTPATIENT
Start: 2025-04-15

## 2025-04-15 RX ORDER — FLUVOXAMINE MALEATE 100 MG
TABLET ORAL
Qty: 270 TABLET | Refills: 2 | Status: SHIPPED | OUTPATIENT
Start: 2025-04-15

## 2025-04-15 RX ORDER — QUETIAPINE FUMARATE 400 MG/1
400 TABLET, FILM COATED ORAL
Qty: 90 TABLET | Refills: 3 | Status: SHIPPED | OUTPATIENT
Start: 2025-04-15

## 2025-04-15 NOTE — BH CRISIS PLAN
Client Name: Ayleen Moralez       Client YOB: 1960    Jennifer Safety Plan      Creation Date: 4/15/25 Update Date: 4/15/25   Created By: EMERITA Lynn Last Updated By: EMERITA Lynn      Step 1: Warning Signs:   Warning Signs   More anxious   Become more obsessive compulsive   Withdrawn            Step 2: Internal Coping Strategies:   Internal Coping Strategies   Listen to music            Step 3: People and social settings that provide distraction:   Name Contact Information   Sister Sherie GRAY            Step 4: People whom I can ask for help during a crisis:      Name Contact Information    See above       Step 5: Professionals or agencies I can contact during a crisis:      Clinican/Agency Name Phone Emergency Contact    [facility] [phone]       [facility] Emergency Department Emergency Department Phone Emergency Department Address    [phone]        Crisis Phone Numbers:   Suicide Prevention Lifeline: Call or Text  697 Crisis Text Line: Text HOME to 225-377   Please note: Some [facility] do not have a separate number for Child/Adolescent specific crisis. If your county is not listed under Child/Adolescent, please call the adult number for your county      Adult Crisis Numbers: Child/Adolescent Crisis Numbers   [facility]: [phone] [facility]: [phone]   [facility]: [phone] [facility]: [phone]   [facility]: [phone] [place], [place]: [phone]   [facility]: [phone] [place]: [phone]   [facility]: [phone]   [facility]: [phone]   [facility]: [phone]   [facility]: [phone] (daytime) [phone] (after hours, weekends, holidays)      Step 6: Making the environment safer (plan for lethal means safety):   Patient did not identify any lethal methods: Yes     Optional: What is most important to me and worth living for?   N/A      Jennifer Safety Plan. Kaitlin Tabares and Alf Solomon. Used with permission of the authors.

## 2025-04-16 ENCOUNTER — TELEPHONE (OUTPATIENT)
Age: 65
End: 2025-04-16

## 2025-04-21 ENCOUNTER — TELEPHONE (OUTPATIENT)
Dept: BEHAVIORAL/MENTAL HEALTH CLINIC | Facility: CLINIC | Age: 65
End: 2025-04-21

## 2025-04-21 NOTE — TELEPHONE ENCOUNTER
Patient called in regarding a missed call.     Writer relayed provides msg.     Patient shared she is currently scheduled for a TT f/u appt on 7/7 @1pm and does not need to see provider at the moment.

## 2025-04-21 NOTE — TELEPHONE ENCOUNTER
offerred 2 appts for tomorrow 4/22. asked for call if interested, left my teams number and access line.

## 2025-04-22 ENCOUNTER — TELEPHONE (OUTPATIENT)
Age: 65
End: 2025-04-22

## 2025-04-22 NOTE — TELEPHONE ENCOUNTER
Sharon called looking for Dr. Roche's information/phone number. She said she met with him but them she was taken off the transplant list and now she needs a letter for her insurance. She said Dr. Mills referred her to him. Please follow up with patient. Thank you

## 2025-04-22 NOTE — TELEPHONE ENCOUNTER
Caller: Patient     Doctor: Dr. Iglesia Winslow MD     Reason for call: Pt called to request a letter stating when she was last seen in office. Pt is requesting letter be faxed to her at [email or web address].     Call back#: [phone]

## 2025-04-23 NOTE — TELEPHONE ENCOUNTER
DANDYM for pt with Dr. Garibay's phone number for pt to call with any questions or concerns. Asked pt to give us a call back with any further concerns.

## 2025-04-23 NOTE — TELEPHONE ENCOUNTER
Dr. Garibay is the surgeon.  Dr. Clayton saw her as well.  You can send her whatever [place] transplant information she needs for her insurance and she can also call her pre-transplant coordinator as well. Thanks

## 2025-04-24 ENCOUNTER — TELEPHONE (OUTPATIENT)
Dept: PSYCHIATRY | Facility: CLINIC | Age: 65
End: 2025-04-24

## 2025-04-24 NOTE — TELEPHONE ENCOUNTER
Client is on waitlist for Ksenia Becker and was called and offered to take opening at 11AM today, 4/24. If client calls please schedule her.

## 2025-04-25 DIAGNOSIS — M26.629 TMJ SYNDROME: ICD-10-CM

## 2025-04-25 DIAGNOSIS — R19.7 DIARRHEA, UNSPECIFIED TYPE: ICD-10-CM

## 2025-04-25 RX ORDER — DICYCLOMINE HYDROCHLORIDE 10 MG/1
10 CAPSULE ORAL 4 TIMES DAILY PRN
Qty: 30 CAPSULE | Refills: 0 | Status: SHIPPED | OUTPATIENT
Start: 2025-04-25

## 2025-04-25 RX ORDER — CYCLOBENZAPRINE HCL 10 MG
10 TABLET ORAL
Qty: 30 TABLET | Refills: 0 | Status: SHIPPED | OUTPATIENT
Start: 2025-04-25

## 2025-04-25 NOTE — TELEPHONE ENCOUNTER
Reason for call:   [x] Refill   [] Prior Auth  [] Other:     Office:   [x] PCP/Provider - [facility] [address]  [] Specialty/Provider -     Medication:   ~ cyclobenzaprine (FLEXERIL) 10 mg tablet - Take 1 tablet (10 mg total) by mouth daily at bedtime   ~ dicyclomine (BENTYL) 10 mg capsule - Take 1 capsule (10 mg total) by mouth 4 (four) times a day as needed (abdominal cramping)     Pharmacy:  [facility] [place] - [address]    Local Pharmacy  Does the patient have enough for 3 days?  [] Yes  [x] No - Send as HP to POD

## 2025-04-28 ENCOUNTER — NURSE TRIAGE (OUTPATIENT)
Age: 65
End: 2025-04-28

## 2025-04-28 NOTE — TELEPHONE ENCOUNTER
"FOLLOW UP: Patient will proceed to urgent care or emergency room depending on insurance coverage    REASON FOR CONVERSATION: boil on skin    SYMPTOMS: noelle/dime size boil under left breast that is red, swollen, tender, and pus is present in center    OTHER: N/A    DISPOSITION: See Today in Office      Reason for Disposition   Boil > 1/2 inch across (> 12 mm; larger than a marble) and center is soft or pus colored    Answer Assessment - Initial Assessment Questions  1. APPEARANCE of BOIL: \"What does the boil look like?\"       Red, pus in middle. Looks like big pimple. Swelling is size of nickel, boil is noelle or dime size  2. LOCATION: \"Where is the boil located?\"       Under left breast  3. NUMBER: \"How many boils are there?\"       1  4. SIZE: \"How big is the boil?\" (e.g., inches, cm; compare to size of a coin or other object)      dime  5. ONSET: \"When did the boil start?\"      Pain yesterday, looked under breast today and noticed   6. PAIN: \"Is there any pain?\" If Yes, ask: \"How bad is the pain?\"   (Scale 1-10; or mild, moderate, severe)      6/10, hurts to touch  7. FEVER: \"Do you have a fever?\" If Yes, ask: \"What is it, how was it measured, and when did it start?\"       denies  8. SOURCE: \"Have you been around anyone with boils or other Staph infections?\" \"Have you ever had boils before?\"      Denies both  9. OTHER SYMPTOMS: \"Do you have any other symptoms?\" (e.g., shaking chills, weakness, rash elsewhere on body)      Area started with rash all on left side, was putting palmers lotion on yesterday. Rash is now mostly resolved but little dry  10. PREGNANCY: \"Is there any chance you are pregnant?\" \"When was your last menstrual period?\"            Protocols used: Boil (Skin Abscess)-Adult-[place]    "

## 2025-04-28 NOTE — TELEPHONE ENCOUNTER
Patient called stating she woke up this morning with a boil under her left breast that looks infected - nickel sized / red / leaking white/red stuff. She has an ED follow up scheduled tomorrow but would like to know if this is something that can wait until tomorrow.

## 2025-04-29 ENCOUNTER — OFFICE VISIT (OUTPATIENT)
Dept: FAMILY MEDICINE CLINIC | Facility: HOSPITAL | Age: 65
End: 2025-04-29
Payer: MEDICARE

## 2025-04-29 VITALS
DIASTOLIC BLOOD PRESSURE: 65 MMHG | HEART RATE: 80 BPM | SYSTOLIC BLOOD PRESSURE: 123 MMHG | OXYGEN SATURATION: 95 % | BODY MASS INDEX: 29.76 KG/M2 | WEIGHT: 190 LBS | TEMPERATURE: 99.4 F

## 2025-04-29 DIAGNOSIS — E83.42 HYPOMAGNESEMIA: ICD-10-CM

## 2025-04-29 DIAGNOSIS — N61.1 BOIL, BREAST: Primary | ICD-10-CM

## 2025-04-29 DIAGNOSIS — N18.4 STAGE 4 CHRONIC KIDNEY DISEASE (HCC): ICD-10-CM

## 2025-04-29 PROBLEM — E55.9 VITAMIN D DEFICIENCY: Status: RESOLVED | Noted: 2024-02-08 | Resolved: 2025-04-29

## 2025-04-29 PROBLEM — M70.50: Status: RESOLVED | Noted: 2023-11-14 | Resolved: 2025-04-29

## 2025-04-29 PROBLEM — R26.2 DIFFICULTY IN WALKING, NOT ELSEWHERE CLASSIFIED: Status: RESOLVED | Noted: 2023-11-14 | Resolved: 2025-04-29

## 2025-04-29 PROBLEM — F41.9 ANXIETY DISORDER: Status: RESOLVED | Noted: 2023-11-14 | Resolved: 2025-04-29

## 2025-04-29 PROBLEM — F11.20 OPIOID DEPENDENCE (HCC): Status: RESOLVED | Noted: 2023-11-14 | Resolved: 2025-04-29

## 2025-04-29 PROBLEM — E66.01 MORBID OBESITY (HCC): Status: RESOLVED | Noted: 2023-11-14 | Resolved: 2025-04-29

## 2025-04-29 PROBLEM — E78.00 PURE HYPERCHOLESTEROLEMIA: Status: RESOLVED | Noted: 2023-11-14 | Resolved: 2025-04-29

## 2025-04-29 PROBLEM — I10 ESSENTIAL HYPERTENSION: Status: RESOLVED | Noted: 2023-11-14 | Resolved: 2025-04-29

## 2025-04-29 PROBLEM — J45.40 MODERATE PERSISTENT ASTHMA WITHOUT COMPLICATION: Status: RESOLVED | Noted: 2023-11-14 | Resolved: 2025-04-29

## 2025-04-29 PROBLEM — E66.9 OBESITY (BMI 30-39.9): Status: RESOLVED | Noted: 2023-07-11 | Resolved: 2025-04-29

## 2025-04-29 PROBLEM — F42.9 OBSESSIVE-COMPULSIVE DISORDER: Status: RESOLVED | Noted: 2023-11-14 | Resolved: 2025-04-29

## 2025-04-29 PROBLEM — F50.9 EATING DISORDER, UNSPECIFIED: Status: RESOLVED | Noted: 2022-09-28 | Resolved: 2025-04-29

## 2025-04-29 PROBLEM — M70.50 BURSITIS OF KNEE: Status: RESOLVED | Noted: 2023-11-14 | Resolved: 2025-04-29

## 2025-04-29 PROBLEM — M25.461 EFFUSION OF RIGHT KNEE: Status: RESOLVED | Noted: 2021-02-12 | Resolved: 2025-04-29

## 2025-04-29 PROBLEM — K21.9 GASTROESOPHAGEAL REFLUX DISEASE WITHOUT ESOPHAGITIS: Status: RESOLVED | Noted: 2023-11-14 | Resolved: 2025-04-29

## 2025-04-29 PROBLEM — F41.0 PANIC DISORDER: Status: RESOLVED | Noted: 2024-02-08 | Resolved: 2025-04-29

## 2025-04-29 PROBLEM — M54.50 LOW BACK PAIN: Status: RESOLVED | Noted: 2024-02-08 | Resolved: 2025-04-29

## 2025-04-29 PROCEDURE — 99213 OFFICE O/P EST LOW 20 MIN: CPT | Performed by: NURSE PRACTITIONER

## 2025-04-29 PROCEDURE — G2211 COMPLEX E/M VISIT ADD ON: HCPCS | Performed by: NURSE PRACTITIONER

## 2025-04-29 RX ORDER — DOXYCYCLINE HYCLATE 100 MG
100 TABLET ORAL 2 TIMES DAILY
Qty: 14 TABLET | Refills: 0 | Status: SHIPPED | OUTPATIENT
Start: 2025-04-29 | End: 2025-05-06

## 2025-04-29 NOTE — ASSESSMENT & PLAN NOTE
Noted a boil on her left breast yesterday with pain, chills and redness/warmth. She popped it and reports purulent drainage.  Reports it looks better today.  Ongoing chills without change in appetite.  No further drainage from boil today however it appears cellulitic.  - Instructed to wash site daily with antibacterial soap and water pat dry.  Cover with Band-Aid until scabbed over then allow much air as possible.  - Will treat with doxycycline.

## 2025-04-29 NOTE — PROGRESS NOTES
[facility]   Zaira FELDER    Assessment/Plan:   1. Boil, breast  Assessment & Plan:  Noted a boil on her left breast yesterday with pain, chills and redness/warmth. She popped it and reports purulent drainage.  Reports it looks better today.  Ongoing chills without change in appetite.  No further drainage from boil today however it appears cellulitic.  - Instructed to wash site daily with antibacterial soap and water pat dry.  Cover with Band-Aid until scabbed over then allow much air as possible.  - Will treat with doxycycline.    Orders:  -     doxycycline hyclate (VIBRA-TABS) 100 mg tablet; Take 1 tablet (100 mg total) by mouth 2 (two) times a day for 7 days  -     CBC and differential; Future  2. Hypomagnesemia  -     Magnesium; Future  3. Stage 4 chronic kidney disease (HCC)  -     Comprehensive metabolic panel; Future      Return for Next scheduled follow up.  Patient may call or return to office with any questions or concerns.     ______________________________________________________________________  Subjective:     Patient ID: Ayleen Moralez is a 64 y.o. female.  Ayleen Moralez  Chief Complaint   Patient presents with    Follow-up     ER follow up. Pt also reports a boil on her left abdomen under breast     Here for follow-up with concerns of a boil on left breast.      Was seen in the ER on 4/7/2025 and diagnosed with colitis and hypomagnesia.  Reports she has diarrhea at baseline however it was increased for a week prior to ER visitation with associated nausea and lower abdominal pain.  She reports her symptoms have since resolved posttreatment in the ER and her diarrhea is back to baseline.  She has not had any further nausea and vomiting nor abdominal pain.               The following portions of the patient's history were reviewed and updated as appropriate: allergies, current medications, past family history, past medical history, past social history, past surgical  history, and problem list.    Review of Systems   Constitutional:  Positive for chills. Negative for activity change, appetite change, fatigue and fever.   HENT: Negative.  Negative for congestion, ear pain, postnasal drip and sinus pain.    Eyes: Negative.    Respiratory: Negative.  Negative for cough and shortness of breath.    Cardiovascular: Negative.  Negative for chest pain and leg swelling.   Gastrointestinal:  Positive for diarrhea. Negative for constipation.        Has chronic episodic diarrhea back to baseline, improved since ER visit.     Endocrine: Negative.    Genitourinary: Negative.  Negative for dysuria.   Musculoskeletal: Negative.    Skin:  Positive for wound.   Allergic/Immunologic: Negative.  Negative for immunocompromised state.   Neurological: Negative.  Negative for dizziness and light-headedness.   Hematological: Negative.    Psychiatric/Behavioral: Negative.           Objective:      Vitals:    04/29/25 1102   BP: 123/65   Pulse: 80   Temp: 99.4 °F (37.4 °C)   SpO2: 95%      Physical Exam  Vitals and nursing note reviewed. Exam conducted with a chaperone present.   Constitutional:       Appearance: Normal appearance.   HENT:      Head: Normocephalic and atraumatic.      Right Ear: Tympanic membrane, ear canal and external ear normal.      Left Ear: Tympanic membrane, ear canal and external ear normal.      Nose: Nose normal.      Mouth/Throat:      Mouth: Mucous membranes are moist.      Pharynx: Oropharynx is clear.   Eyes:      Extraocular Movements: Extraocular movements intact.      Conjunctiva/sclera: Conjunctivae normal.      Pupils: Pupils are equal, round, and reactive to light.   Cardiovascular:      Rate and Rhythm: Normal rate and regular rhythm.      Pulses: Normal pulses.      Heart sounds: Murmur heard.   Pulmonary:      Effort: Pulmonary effort is normal.      Breath sounds: Normal breath sounds.   Abdominal:      General: Bowel sounds are normal.      Palpations: Abdomen is  "soft.   Musculoskeletal:         General: Normal range of motion.      Cervical back: Normal range of motion and neck supple.      Right lower leg: No edema.      Left lower leg: No edema.   Skin:     General: Skin is warm and dry.      Findings: Erythema and wound present.      Comments: Popped boil left breast +warmth, erythema, pain.  See media image   Neurological:      General: No focal deficit present.      Mental Status: She is alert and oriented to person, place, and time.      Comments: Tardive dyskinesia   Psychiatric:         Mood and Affect: Mood normal.         Behavior: Behavior normal.         Thought Content: Thought content normal.         Judgment: Judgment normal.           Portions of the record may have been created with voice recognition software. Occasional wrong word or \"sound alike\" substitutions may have occurred due to the inherent limitations of voice recognition software. Please review the chart carefully and recognize, using context, where substitutions/typographical errors may have occurred.       "

## 2025-04-29 NOTE — PATIENT INSTRUCTIONS
Doxycyline as directed.   Wash site daily with antibacterial soap and water.  Pat dry with paper towel.  Cover with bandaid until scabbed over.  Then allow for air as much as possible  Recheck labwork as ordered.

## 2025-05-01 ENCOUNTER — TELEPHONE (OUTPATIENT)
Dept: ENDOCRINOLOGY | Facility: HOSPITAL | Age: 65
End: 2025-05-01

## 2025-05-01 NOTE — TELEPHONE ENCOUNTER
The patient's [employer] benefits have been received and she is going to have a 20% OOP and Adminstration fee costs.  Estimated cost of $400 or more.  She has met her deductible of $257.  No auth needed at this time.  I did call her and leave a detailed message concerning this as well.

## 2025-05-07 ENCOUNTER — APPOINTMENT (OUTPATIENT)
Dept: LAB | Facility: HOSPITAL | Age: 65
End: 2025-05-07
Payer: MEDICARE

## 2025-05-07 DIAGNOSIS — N61.1 BOIL, BREAST: ICD-10-CM

## 2025-05-07 DIAGNOSIS — E87.0 HYPEROSMOLALITY AND HYPERNATREMIA: ICD-10-CM

## 2025-05-07 DIAGNOSIS — N25.81 SECONDARY HYPERPARATHYROIDISM OF RENAL ORIGIN (HCC): ICD-10-CM

## 2025-05-07 DIAGNOSIS — E23.2 DIABETES INSIPIDUS (HCC): ICD-10-CM

## 2025-05-07 DIAGNOSIS — E55.9 VITAMIN D DEFICIENCY, UNSPECIFIED: ICD-10-CM

## 2025-05-07 DIAGNOSIS — I10 ESSENTIAL (PRIMARY) HYPERTENSION: ICD-10-CM

## 2025-05-07 DIAGNOSIS — E83.42 HYPOMAGNESEMIA: ICD-10-CM

## 2025-05-07 DIAGNOSIS — N18.4 STAGE 4 CHRONIC KIDNEY DISEASE (HCC): ICD-10-CM

## 2025-05-07 LAB
ALBUMIN SERPL BCG-MCNC: 3.7 G/DL (ref 3.5–5)
ALP SERPL-CCNC: 101 U/L (ref 34–104)
ALT SERPL W P-5'-P-CCNC: 17 U/L (ref 7–52)
ANION GAP SERPL CALCULATED.3IONS-SCNC: 8 MMOL/L (ref 4–13)
AST SERPL W P-5'-P-CCNC: 20 U/L (ref 13–39)
BACTERIA UR QL AUTO: NORMAL /HPF
BASOPHILS # BLD AUTO: 0.07 THOUSANDS/ÂΜL (ref 0–0.1)
BASOPHILS NFR BLD AUTO: 1 % (ref 0–1)
BILIRUB SERPL-MCNC: 0.44 MG/DL (ref 0.2–1)
BILIRUB UR QL STRIP: NEGATIVE
BUN SERPL-MCNC: 37 MG/DL (ref 5–25)
CALCIUM SERPL-MCNC: 9.8 MG/DL (ref 8.4–10.2)
CHLORIDE SERPL-SCNC: 110 MMOL/L (ref 96–108)
CLARITY UR: CLEAR
CO2 SERPL-SCNC: 23 MMOL/L (ref 21–32)
COLOR UR: YELLOW
CREAT SERPL-MCNC: 2.58 MG/DL (ref 0.6–1.3)
CREAT UR-MCNC: 33.3 MG/DL
EOSINOPHIL # BLD AUTO: 0.4 THOUSAND/ÂΜL (ref 0–0.61)
EOSINOPHIL NFR BLD AUTO: 6 % (ref 0–6)
ERYTHROCYTE [DISTWIDTH] IN BLOOD BY AUTOMATED COUNT: 13.2 % (ref 11.6–15.1)
GFR SERPL CREATININE-BSD FRML MDRD: 19 ML/MIN/1.73SQ M
GLUCOSE P FAST SERPL-MCNC: 108 MG/DL (ref 65–99)
GLUCOSE UR STRIP-MCNC: NEGATIVE MG/DL
HCT VFR BLD AUTO: 37.7 % (ref 34.8–46.1)
HGB BLD-MCNC: 11.9 G/DL (ref 11.5–15.4)
HGB UR QL STRIP.AUTO: NEGATIVE
IMM GRANULOCYTES # BLD AUTO: 0.03 THOUSAND/UL (ref 0–0.2)
IMM GRANULOCYTES NFR BLD AUTO: 0 % (ref 0–2)
KETONES UR STRIP-MCNC: NEGATIVE MG/DL
LEUKOCYTE ESTERASE UR QL STRIP: NEGATIVE
LYMPHOCYTES # BLD AUTO: 1.78 THOUSANDS/ÂΜL (ref 0.6–4.47)
LYMPHOCYTES NFR BLD AUTO: 26 % (ref 14–44)
MAGNESIUM SERPL-MCNC: 1.7 MG/DL (ref 1.9–2.7)
MCH RBC QN AUTO: 32.5 PG (ref 26.8–34.3)
MCHC RBC AUTO-ENTMCNC: 31.6 G/DL (ref 31.4–37.4)
MCV RBC AUTO: 103 FL (ref 82–98)
MICROALBUMIN UR-MCNC: <7 MG/L
MONOCYTES # BLD AUTO: 0.53 THOUSAND/ÂΜL (ref 0.17–1.22)
MONOCYTES NFR BLD AUTO: 8 % (ref 4–12)
NEUTROPHILS # BLD AUTO: 4.1 THOUSANDS/ÂΜL (ref 1.85–7.62)
NEUTS SEG NFR BLD AUTO: 59 % (ref 43–75)
NITRITE UR QL STRIP: NEGATIVE
NON-SQ EPI CELLS URNS QL MICRO: NORMAL /HPF
NRBC BLD AUTO-RTO: 0 /100 WBCS
PH UR STRIP.AUTO: 6 [PH]
PHOSPHATE SERPL-MCNC: 3.9 MG/DL (ref 2.3–4.1)
PLATELET # BLD AUTO: 235 THOUSANDS/UL (ref 149–390)
PMV BLD AUTO: 10.2 FL (ref 8.9–12.7)
POTASSIUM SERPL-SCNC: 3.7 MMOL/L (ref 3.5–5.3)
PROT SERPL-MCNC: 6.2 G/DL (ref 6.4–8.4)
PROT UR STRIP-MCNC: NEGATIVE MG/DL
PTH-INTACT SERPL-MCNC: 54.2 PG/ML (ref 12–88)
RBC # BLD AUTO: 3.66 MILLION/UL (ref 3.81–5.12)
RBC #/AREA URNS AUTO: NORMAL /HPF
SODIUM SERPL-SCNC: 141 MMOL/L (ref 135–147)
SP GR UR STRIP.AUTO: 1.01 (ref 1–1.03)
URATE SERPL-MCNC: 6.4 MG/DL (ref 2–7.5)
UROBILINOGEN UR STRIP-ACNC: <2 MG/DL
WBC # BLD AUTO: 6.91 THOUSAND/UL (ref 4.31–10.16)
WBC #/AREA URNS AUTO: NORMAL /HPF

## 2025-05-07 PROCEDURE — 85025 COMPLETE CBC W/AUTO DIFF WBC: CPT

## 2025-05-08 ENCOUNTER — RESULTS FOLLOW-UP (OUTPATIENT)
Dept: FAMILY MEDICINE CLINIC | Facility: HOSPITAL | Age: 65
End: 2025-05-08

## 2025-05-08 NOTE — TELEPHONE ENCOUNTER
Ayleen returned missed call, relayed Zaira Eckert message, Ayleen did not have any further questions or concerns at the moment.

## 2025-05-09 DIAGNOSIS — K52.9 CHRONIC DIARRHEA: Primary | ICD-10-CM

## 2025-05-09 RX ORDER — CHOLESTYRAMINE LIGHT 4 G/5.7G
4 POWDER, FOR SUSPENSION ORAL 2 TIMES DAILY
Qty: 60 EACH | Refills: 5 | Status: SHIPPED | OUTPATIENT
Start: 2025-05-09

## 2025-05-13 ENCOUNTER — CLINICAL SUPPORT (OUTPATIENT)
Dept: ENDOCRINOLOGY | Facility: HOSPITAL | Age: 65
End: 2025-05-13
Payer: MEDICARE

## 2025-05-13 DIAGNOSIS — M81.0 AGE-RELATED OSTEOPOROSIS WITHOUT CURRENT PATHOLOGICAL FRACTURE: Primary | ICD-10-CM

## 2025-05-13 PROCEDURE — 96372 THER/PROPH/DIAG INJ SC/IM: CPT

## 2025-05-13 NOTE — PROGRESS NOTES
Assessment/Plan:    Ayleen Moralez came into the [facility] Office today 05/13/25 to receive her injection.      Verbal consent obtained.  Consent given by: patient    patient states patient has been medically healthy with no underlining concerns/complications.      Ayleen Moralez presents with no symptoms today.       All insturctions were reviewed with the patient.    If the patient should have any questions/concerns, advised patient to contacted [facility].       Subjective:     History provided by: patient    Patient ID: Ayleen Moralez is a 64 y.o. female      Objective:    There were no vitals filed for this visit.    Patient tolerated the injection well without any complications.  Injection site/s Right Arm.  Medication was provided by the office/buy and bill.    Patient signed consent form yes   Patient signed ABN form yes (If no patient is not a medicare patient).   Patient waited 15 minutes after injection no (This only applies to patient's receiving first time injection).       Last Visit: 5/7/2025  Next visit:Visit date not found

## 2025-05-20 ENCOUNTER — TELEPHONE (OUTPATIENT)
Dept: BEHAVIORAL/MENTAL HEALTH CLINIC | Facility: CLINIC | Age: 65
End: 2025-05-20

## 2025-05-20 NOTE — TELEPHONE ENCOUNTER
called client to offer appts on thurs or friday this week at 8 am. Unable to leave mess. b/c mailbox full.

## 2025-05-21 ENCOUNTER — DOCUMENTATION (OUTPATIENT)
Dept: GENETICS | Facility: CLINIC | Age: 65
End: 2025-05-21

## 2025-05-21 ENCOUNTER — TELEPHONE (OUTPATIENT)
Dept: PSYCHIATRY | Facility: CLINIC | Age: 65
End: 2025-05-21

## 2025-05-21 NOTE — TELEPHONE ENCOUNTER
Spoke to client about some available appointments with Inga Becker that are available this week since client is on the wait list. Client said she is on vacation currently and would not be available. Writer informed her that she would be outreached again if any other appointments opened up with Inga Becker while she is on the wait list.

## 2025-05-23 ENCOUNTER — TELEPHONE (OUTPATIENT)
Dept: OTHER | Facility: OTHER | Age: 65
End: 2025-05-23

## 2025-05-23 NOTE — TELEPHONE ENCOUNTER
Received a call from the patient. Informed her that no other labs are needed to be done prior to her appointment. Thanks!

## 2025-05-23 NOTE — TELEPHONE ENCOUNTER
Pt called in stating she has an upcoming appt and was at the lab and there were no lab orders in her chart.

## 2025-05-23 NOTE — TELEPHONE ENCOUNTER
Called patient no answer and voicemail was full. Sent her a my chart message making her aware no further labs needed.

## 2025-05-24 ENCOUNTER — HOSPITAL ENCOUNTER (OUTPATIENT)
Dept: MRI IMAGING | Facility: HOSPITAL | Age: 65
Discharge: HOME | End: 2025-05-24
Attending: INTERNAL MEDICINE
Payer: MEDICARE

## 2025-05-24 DIAGNOSIS — N18.6 END STAGE RENAL DISEASE (HCC): ICD-10-CM

## 2025-05-24 DIAGNOSIS — N28.1 CYST OF KIDNEY, ACQUIRED: ICD-10-CM

## 2025-05-24 DIAGNOSIS — R93.89 ABNORMAL FINDING ON CT SCAN: ICD-10-CM

## 2025-05-24 DIAGNOSIS — Z01.818 ENCOUNTER FOR OTHER PREPROCEDURAL EXAMINATION: ICD-10-CM

## 2025-05-24 PROCEDURE — 74183 MRI ABD W/O CNTR FLWD CNTR: CPT

## 2025-05-24 PROCEDURE — A9585 GADOBUTROL INJECTION: HCPCS | Performed by: INTERNAL MEDICINE

## 2025-05-24 RX ORDER — GADOBUTROL 604.72 MG/ML
8 INJECTION INTRAVENOUS
Status: COMPLETED | OUTPATIENT
Start: 2025-05-24 | End: 2025-05-24

## 2025-05-24 RX ADMIN — GADOBUTROL 8 ML: 604.72 INJECTION INTRAVENOUS at 10:27

## 2025-05-28 ENCOUNTER — OFFICE VISIT (OUTPATIENT)
Dept: PODIATRY | Facility: CLINIC | Age: 65
End: 2025-05-28

## 2025-05-28 ENCOUNTER — TELEPHONE (OUTPATIENT)
Age: 65
End: 2025-05-28

## 2025-05-28 VITALS — BODY MASS INDEX: 29.82 KG/M2 | HEIGHT: 67 IN | WEIGHT: 190 LBS

## 2025-05-28 DIAGNOSIS — M26.629 TMJ SYNDROME: ICD-10-CM

## 2025-05-28 DIAGNOSIS — B35.1 ONYCHOMYCOSIS: Primary | ICD-10-CM

## 2025-05-28 DIAGNOSIS — M79.605 BILATERAL LEG PAIN: ICD-10-CM

## 2025-05-28 DIAGNOSIS — I10 PRIMARY HYPERTENSION: ICD-10-CM

## 2025-05-28 DIAGNOSIS — R19.7 DIARRHEA, UNSPECIFIED TYPE: ICD-10-CM

## 2025-05-28 DIAGNOSIS — M79.604 BILATERAL LEG PAIN: ICD-10-CM

## 2025-05-28 PROCEDURE — NCFTCARE PR NON-COVERED FOOT CARE: Performed by: PODIATRIST

## 2025-05-28 RX ORDER — AMLODIPINE BESYLATE 5 MG/1
5 TABLET ORAL DAILY
Qty: 90 TABLET | Refills: 0 | Status: SHIPPED | OUTPATIENT
Start: 2025-05-28

## 2025-05-28 NOTE — PROGRESS NOTES
PATIENT:  Ayleen Moralez  1960    ASSESSMENT/PLAN:  1. Onychomycosis                   The patient was educated in her diagnosis.  Instructed skin care and protection.  Nails trimmed.  RA in 12 weeks.             HPI:  Ayleen Moralez is a 64 y.o.year old female seen for chief complaint of elongated nails. The patient denied any acute pedal disorder.      PAST MEDICAL HISTORY:  Past Medical History:   Diagnosis Date    Aftercare following left knee joint replacement surgery 02/15/2024    Anxiety     Anxiety disorder     Arthritis     At risk for falls     Bipolar 2 disorder (HCC)     Chronic back pain     Chronic kidney disease     Closed fracture of distal end of right fibula with routine healing 2020    COVID-19     in 2021    CVA (cerebral vascular accident) (HCC)     noted on MRI in the past    Depression     GERD (gastroesophageal reflux disease)     Hypercholesteremia     Hypernatremia     Hypertension     Hypokalemia     Idiopathic chronic pancreatitis (HCC) 2018    Intervertebral disc disorder with radiculopathy of lumbosacral region     resolved: 2015    Limb alert care status     LUE-fistula    Panic attacks     Pericardial effusion     PONV (postoperative nausea and vomiting)     Psychiatric problem     Radiculitis     resolved: 2015    Secondary renal hyperparathyroidism (HCC)     Stroke (HCC)     Vitamin D deficiency        PAST SURGICAL HISTORY:  Past Surgical History:   Procedure Laterality Date    BUNIONECTOMY      Left foot     CAST APPLICATION Right 2022    Procedure: Application short-arm thumb spica splint;  Surgeon: Lyndon Victoria MD;  Location:  MAIN OR;  Service: Orthopedics    COLON SURGERY      COLONOSCOPY  2018    COLONOSCOPY  2021    DILATION AND CURETTAGE OF UTERUS      INDUCED       surgically induced    RI ARTERIOVENOUS ANASTOMOSIS OPEN DIRECT Left 2019    Procedure: CREATION FISTULA  ARTERIOVENOUS (AV) left wrists possible left upper;  Surgeon: Andrey Quintero MD;  Location: [facility];  Service: Vascular    TN ARTHRP KNE CONDYLE&PLATU MEDIAL&LAT COMPARTMENTS Left 02/05/2024    Procedure: ARTHROPLASTY KNEE TOTAL SAME DAY;  Surgeon: Riki Ma MD;  Location: [facility];  Service: Orthopedics    TN CORRJ HLX VLGS BNCTY SESMDC DSTL METAR OSTEOT Left 07/01/2019    Procedure: Serge bunionectomy;  Surgeon: Munir Larkin DPM;  [facility];  Service: Podiatry    TN CORRJ HLX VLGS BNCTY SESMDC DSTL METAR OSTEOT Right 08/03/2020    Procedure: BUNIONECTOMY RAFAEL;  Surgeon: Munir Larkin DPM;  [facility];  Service: Podiatry    TN CORRJ HLX VLGS BNCTY SESMDC PROX PHLX OSTEOT Right 09/27/2021    Procedure: BUNIONECTOMY TAMEKA, right tameka osteotomy and 2nd claw toe correction;  Surgeon: James R Lachman, MD;  Location: [facility];  Service: Orthopedics    TN ERCP DX COLLECTION SPECIMEN BRUSHING/WASHING N/A 04/11/2018    Procedure: ENDOSCOPIC RETROGRADE CHOLANGIOPANCREATOGRAPHY (ERCP);  Surgeon: Alfredo Messina MD;  [facility];  Service: Gastroenterology    TN LAPAROSCOPY PROCTOPEXY PROLAPSE N/A 07/13/2018    Procedure: ROBOTIC SIGMOID RESECTION / RECTOPEXY;  Surgeon: ELPIDIO Mcnulty MD;  [facility]: [facility];  Service: Colorectal    TN OPEN TREATMENT RADIAL SHAFT FRACTURE W/INT FIXJ Right 10/11/2021    Procedure: OPEN REDUCTION W/ INTERNAL FIXATION (ORIF) RADIUS (WRIST), RIGHT DISTAL;  Surgeon: Riki Ma MD;  [facility];  Service: Orthopedics    TN REMOVAL IMPLANT DEEP Right 06/23/2022    Procedure: Removal of hardware volar aspect right distal radius (distal radial plate and screws);  Surgeon: Lyndon Victoria MD;  [facility];  Service: Orthopedics    TN SIGMOIDOSCOPY FLX DX W/COLLJ SPEC BR/WA IF PFRMD N/A 07/13/2018    Procedure: SIGMOIDOSCOPY FLEXIBLE;  Surgeon: ELPIDIO Mcnulty MD;  [facility];  Service: Colorectal    TN TR TDN  RESTORE [facility] ALL 4 FNGRS Right 06/23/2022    Procedure: Right ring finger flexor digitorum superficialis to flexor pollicis longus tendon transfer;  Surgeon: Lyndon Victorai MD;  Location:  MAIN OR;  Service: Orthopedics    TUBAL LIGATION Bilateral 1997    US GUIDED THYROID BIOPSY  07/30/2019        ALLERGIES:  Molds & smuts, Bee pollen, and Pollen extract    MEDICATIONS:  Current Outpatient Medications   Medication Sig Dispense Refill    acetaminophen (TYLENOL) 500 mg tablet Take 2 tablets (1,000 mg total) by mouth every 8 (eight) hours 60 tablet 0    albuterol (Ventolin HFA) 90 mcg/act inhaler Inhale 2 puffs every 6 (six) hours as needed for wheezing or shortness of breath 8.5 g 3    amLODIPine (NORVASC) 5 mg tablet Take 1 tablet (5 mg total) by mouth daily 90 tablet 3    ascorbic acid (VITAMIN C) 500 MG tablet Take 1 tablet (500 mg total) by mouth 2 (two) times a day 60 tablet 2    aspirin 81 mg chewable tablet Chew 1 tablet (81 mg total) daily 60 tablet 0    budesonide-formoterol (Symbicort) 160-4.5 mcg/act inhaler Inhale 2 puffs 2 (two) times a day Rinse mouth after use. 10.2 g 11    calcium carbonate (Calcium 600) 600 MG tablet Take 600 mg by mouth in the morning and 600 mg in the evening. Take with meals.      carvedilol (COREG) 25 mg tablet TAKE 1 TABLET BY MOUTH TWICE  DAILY WITH MEALS 180 tablet 1    cholecalciferol (VITAMIN D3) 1,000 units tablet Take 5 tablets (5,000 Units total) by mouth daily 90 tablet 5    cholestyramine sugar free (QUESTRAN LIGHT) 4 g packet Take 1 packet (4 g total) by mouth 2 (two) times a day 60 each 5    clonazePAM (KlonoPIN) 0.5 mg tablet Take 1 tablet (0.5 mg total) by mouth 3 (three) times a day 270 tablet 0    Cyanocobalamin (VITAMIN B 12 PO) Take 1,000 mcg by mouth in the morning      cyclobenzaprine (FLEXERIL) 10 mg tablet Take 1 tablet (10 mg total) by mouth daily at bedtime 30 tablet 0    denosumab (XGEVA) 120 mg/1.7 mL Inject 120 mg under the skin       dicyclomine (BENTYL) 10 mg capsule Take 1 capsule (10 mg total) by mouth 4 (four) times a day as needed (abdominal cramping) 30 capsule 0    ferrous sulfate 324 (65 Fe) mg Take 1 tablet (324 mg total) by mouth 2 (two) times a day before meals 60 tablet 2    fluticasone (FLONASE) 50 mcg/act nasal spray 2 sprays into each nostril daily as needed for rhinitis (congestion) 15.8 mL 0    fluvoxaMINE (LUVOX) 100 mg tablet Fluvoxamine 100m tablet in the morning ; 2 tablets at night 270 tablet 2    lamoTRIgine (LaMICtal) 200 MG tablet Lamotrigine 200m tablet in the morning , 1 tablet at night 180 tablet 0    montelukast (SINGULAIR) 10 mg tablet Take 1 tablet (10 mg total) by mouth daily at bedtime 30 tablet 5    Multiple Vitamin (MULTI-VITAMIN) tablet Take 1 tablet by mouth daily 30 tablet 2    omeprazole (PriLOSEC) 40 MG capsule TAKE 1 CAPSULE BY MOUTH DAILY  BEFORE BREAKFAST 90 capsule 3    ondansetron (ZOFRAN) 4 mg tablet Take 1 tablet (4 mg total) by mouth every 8 (eight) hours as needed for nausea or vomiting 30 tablet 5    QUEtiapine (SEROquel) 100 mg tablet Take 1 tablet (100 mg total) by mouth in the morning 90 tablet 3    QUEtiapine (SEROquel) 300 mg tablet Take 1 tablet (300 mg total) by mouth in the morning 90 tablet 0    QUEtiapine (SEROquel) 400 MG tablet Take 1 tablet (400 mg total) by mouth daily at bedtime Take with the Seroquel 100 mg to total 500 mg daily at bedtime 90 tablet 3    rosuvastatin (CRESTOR) 10 MG tablet Take 1 tablet (10 mg total) by mouth every evening 90 tablet 3    traMADol (ULTRAM) 50 mg tablet Take 1 tablet (50 mg total) by mouth 2 (two) times a day as needed for moderate pain 60 tablet 0     No current facility-administered medications for this visit.       SOCIAL HISTORY:  Social History     Socioeconomic History    Marital status:    Occupational History    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   Tobacco Use    Smoking status: Never    Smokeless tobacco: Never   Vaping Use     Vaping status: Never Used   Substance and Sexual Activity    Alcohol use: Not Currently    Drug use: Not Currently     Types: Hydrocodone, Marijuana     Comment: JESSE Abbasi has h/o marijuana abuse- not currently    Sexual activity: Not Currently     Partners: Male     Birth control/protection: Post-menopausal   Social History Narrative    Daily caffeine consumption 2-3 servings a day    Lives with family.    No living will.     Has dentures---no dental care.     Primary language--English.     Feels safe at home.     Social Drivers of Health     Financial Resource Strain: Medium Risk (2022)    Overall Financial Resource Strain (CARDIA)     Difficulty of Paying Living Expenses: Somewhat hard   Food Insecurity: Patient Declined (2025)    Nursing - Inadequate Food Risk Classification     Ran Out of Food in the Last Year: Patient declined   Transportation Needs: Patient Declined (2025)    Nursing - Transportation Risk Classification     Lack of Transportation: Patient declined   Physical Activity: Inactive (2021)    Exercise Vital Sign     Days of Exercise per Week: 0 days     Minutes of Exercise per Session: 0 min   Stress: Stress Concern Present (2021)    [employer] [place] [employer] - Occupational Stress Questionnaire     Feeling of Stress : To some extent   Intimate Partner Violence: Patient Declined (2025)    Nursing IPS     Physically Hurt by Someone: Patient declined     Hurt or Threatened by Someone: Patient declined   Housing Stability: Patient Declined (2025)    Nursing: Inadequate Housing Risk Classification     Unable to Pay for Housing in the Last Year: Patient declined     Has Housin        REVIEW OF SYSTEMS:  GENERAL: No fever or chills  HEART: No chest pain, or palpitation  RESPIRATORY:  No acute SOB or cough  GI: No Nausea, vomit or diarrhea  NEUROLOGIC: No syncope or acute weakness    PHYSICAL EXAM:  VASCULAR EXAM  Pedal pulses are intact.   CRT is WNL.     NEUROLOGIC EXAM  Sensation is intact to light touch.  No focal neurologic deficit.          DERMATOLOGIC EXAM:   No cellulitis noted.  The patient has thick, elongated toenails.      MUSCULOSKELETAL EXAM:   No acute joint pain, edema, or redness.  No acute musculoskeletal problem.

## 2025-05-29 ENCOUNTER — TELEPHONE (OUTPATIENT)
Dept: OBGYN CLINIC | Facility: HOSPITAL | Age: 65
End: 2025-05-29

## 2025-05-29 ENCOUNTER — HOSPITAL ENCOUNTER (EMERGENCY)
Facility: HOSPITAL | Age: 65
Discharge: HOME | End: 2025-05-29
Attending: EMERGENCY MEDICINE
Payer: MEDICARE

## 2025-05-29 ENCOUNTER — APPOINTMENT (OUTPATIENT)
Dept: RADIOLOGY | Facility: HOSPITAL | Age: 65
End: 2025-05-29
Payer: MEDICARE

## 2025-05-29 ENCOUNTER — TELEPHONE (OUTPATIENT)
Age: 65
End: 2025-05-29

## 2025-05-29 ENCOUNTER — APPOINTMENT (EMERGENCY)
Dept: RADIOLOGY | Facility: HOSPITAL | Age: 65
End: 2025-05-29
Payer: MEDICARE

## 2025-05-29 VITALS
OXYGEN SATURATION: 96 % | DIASTOLIC BLOOD PRESSURE: 68 MMHG | RESPIRATION RATE: 20 BRPM | TEMPERATURE: 98.3 F | HEART RATE: 89 BPM | SYSTOLIC BLOOD PRESSURE: 131 MMHG

## 2025-05-29 DIAGNOSIS — N18.4 STAGE 4 CHRONIC KIDNEY DISEASE (HCC): ICD-10-CM

## 2025-05-29 DIAGNOSIS — I31.39 OTHER PERICARDIAL EFFUSION (NONINFLAMMATORY): ICD-10-CM

## 2025-05-29 DIAGNOSIS — M54.6 ACUTE LEFT-SIDED THORACIC BACK PAIN: Primary | ICD-10-CM

## 2025-05-29 DIAGNOSIS — R07.9 CHEST PAIN, UNSPECIFIED TYPE: ICD-10-CM

## 2025-05-29 LAB
ALBUMIN SERPL BCG-MCNC: 3.7 G/DL (ref 3.5–5)
ALP SERPL-CCNC: 88 U/L (ref 34–104)
ALT SERPL W P-5'-P-CCNC: 16 U/L (ref 7–52)
ANION GAP SERPL CALCULATED.3IONS-SCNC: 8 MMOL/L (ref 4–13)
AST SERPL W P-5'-P-CCNC: 18 U/L (ref 13–39)
BASOPHILS # BLD AUTO: 0.06 THOUSANDS/ÂΜL (ref 0–0.1)
BASOPHILS NFR BLD AUTO: 1 % (ref 0–1)
BILIRUB SERPL-MCNC: 0.3 MG/DL (ref 0.2–1)
BUN SERPL-MCNC: 22 MG/DL (ref 5–25)
CALCIUM SERPL-MCNC: 10.2 MG/DL (ref 8.4–10.2)
CARDIAC TROPONIN I PNL SERPL HS: 8 NG/L (ref ?–50)
CHLORIDE SERPL-SCNC: 108 MMOL/L (ref 96–108)
CO2 SERPL-SCNC: 23 MMOL/L (ref 21–32)
CREAT SERPL-MCNC: 2.7 MG/DL (ref 0.6–1.3)
EOSINOPHIL # BLD AUTO: 0.46 THOUSAND/ÂΜL (ref 0–0.61)
EOSINOPHIL NFR BLD AUTO: 7 % (ref 0–6)
ERYTHROCYTE [DISTWIDTH] IN BLOOD BY AUTOMATED COUNT: 12.8 % (ref 11.6–15.1)
GFR SERPL CREATININE-BSD FRML MDRD: 18 ML/MIN/1.73SQ M
GLUCOSE SERPL-MCNC: 124 MG/DL (ref 65–140)
HCT VFR BLD AUTO: 35.7 % (ref 34.8–46.1)
HGB BLD-MCNC: 11.2 G/DL (ref 11.5–15.4)
IMM GRANULOCYTES # BLD AUTO: 0.01 THOUSAND/UL (ref 0–0.2)
IMM GRANULOCYTES NFR BLD AUTO: 0 % (ref 0–2)
LIPASE SERPL-CCNC: 41 U/L (ref 11–82)
LYMPHOCYTES # BLD AUTO: 1.98 THOUSANDS/ÂΜL (ref 0.6–4.47)
LYMPHOCYTES NFR BLD AUTO: 28 % (ref 14–44)
MCH RBC QN AUTO: 32.2 PG (ref 26.8–34.3)
MCHC RBC AUTO-ENTMCNC: 31.4 G/DL (ref 31.4–37.4)
MCV RBC AUTO: 103 FL (ref 82–98)
MONOCYTES # BLD AUTO: 0.63 THOUSAND/ÂΜL (ref 0.17–1.22)
MONOCYTES NFR BLD AUTO: 9 % (ref 4–12)
NEUTROPHILS # BLD AUTO: 3.87 THOUSANDS/ÂΜL (ref 1.85–7.62)
NEUTS SEG NFR BLD AUTO: 55 % (ref 43–75)
NRBC BLD AUTO-RTO: 0 /100 WBCS
PLATELET # BLD AUTO: 232 THOUSANDS/UL (ref 149–390)
PMV BLD AUTO: 10 FL (ref 8.9–12.7)
POTASSIUM SERPL-SCNC: 3.1 MMOL/L (ref 3.5–5.3)
PROT SERPL-MCNC: 6 G/DL (ref 6.4–8.4)
RBC # BLD AUTO: 3.48 MILLION/UL (ref 3.81–5.12)
SODIUM SERPL-SCNC: 139 MMOL/L (ref 135–147)
WBC # BLD AUTO: 7.01 THOUSAND/UL (ref 4.31–10.16)

## 2025-05-29 PROCEDURE — 71046 X-RAY EXAM CHEST 2 VIEWS: CPT

## 2025-05-29 PROCEDURE — 80053 COMPREHEN METABOLIC PANEL: CPT

## 2025-05-29 PROCEDURE — 83690 ASSAY OF LIPASE: CPT | Performed by: EMERGENCY MEDICINE

## 2025-05-29 PROCEDURE — 36415 COLL VENOUS BLD VENIPUNCTURE: CPT

## 2025-05-29 PROCEDURE — 99285 EMERGENCY DEPT VISIT HI MDM: CPT | Performed by: EMERGENCY MEDICINE

## 2025-05-29 PROCEDURE — 93005 ELECTROCARDIOGRAM TRACING: CPT

## 2025-05-29 PROCEDURE — 85025 COMPLETE CBC W/AUTO DIFF WBC: CPT

## 2025-05-29 PROCEDURE — 84484 ASSAY OF TROPONIN QUANT: CPT

## 2025-05-29 PROCEDURE — 99285 EMERGENCY DEPT VISIT HI MDM: CPT

## 2025-05-29 RX ORDER — CYCLOBENZAPRINE HCL 10 MG
10 TABLET ORAL
Qty: 30 TABLET | Refills: 0 | Status: SHIPPED | OUTPATIENT
Start: 2025-05-29

## 2025-05-29 RX ORDER — DICYCLOMINE HYDROCHLORIDE 10 MG/1
10 CAPSULE ORAL 4 TIMES DAILY PRN
Qty: 30 CAPSULE | Refills: 0 | Status: SHIPPED | OUTPATIENT
Start: 2025-05-29

## 2025-05-29 RX ORDER — TRAMADOL HYDROCHLORIDE 50 MG/1
50 TABLET ORAL 2 TIMES DAILY PRN
Qty: 60 TABLET | Refills: 0 | Status: SHIPPED | OUTPATIENT
Start: 2025-05-29

## 2025-05-29 RX ORDER — LIDOCAINE 50 MG/G
1 PATCH TOPICAL ONCE
Status: DISCONTINUED | OUTPATIENT
Start: 2025-05-29 | End: 2025-05-30 | Stop reason: HOSPADM

## 2025-05-29 RX ADMIN — LIDOCAINE 1 PATCH: 700 PATCH TOPICAL at 22:19

## 2025-05-29 NOTE — TELEPHONE ENCOUNTER
Caller: Patient    Doctor: Dr. Almaraz    Reason for call: Patient checking on xray that was ordered. Advised it is for her left shoulder.    Call back#: n/a

## 2025-05-29 NOTE — TELEPHONE ENCOUNTER
Patient returning a call asking if her MRI results have been reviewed yet by provider. I advised that the MRI results are still in process, and once results are finalized and reviewed by the provider she will be contacted. Patient verbalized understanding.   Tho

## 2025-05-29 NOTE — TELEPHONE ENCOUNTER
Patient stated that she had MRI done on 5/24. Please review final results when available and call patient back accordingly.    Thank You!!

## 2025-05-30 ENCOUNTER — TELEPHONE (OUTPATIENT)
Age: 65
End: 2025-05-30

## 2025-05-30 LAB
ATRIAL RATE: 99 BPM
P AXIS: 38 DEGREES
PR INTERVAL: 152 MS
QRS AXIS: 202 DEGREES
QRSD INTERVAL: 126 MS
QT INTERVAL: 392 MS
QTC INTERVAL: 504 MS
T WAVE AXIS: 16 DEGREES
VENTRICULAR RATE: 99 BPM

## 2025-05-30 PROCEDURE — 93010 ELECTROCARDIOGRAM REPORT: CPT | Performed by: INTERNAL MEDICINE

## 2025-05-30 NOTE — DISCHARGE INSTRUCTIONS
You have been seen for back and chest pain. Please take tylenol for pain. Complete the echocardiogram as ordered. Return to the emergency department if you develop worsening pain, trouble breathing, lightheadedness, fevers or any other symptoms of concern. Please follow up with your PCP by calling the number provided.

## 2025-05-30 NOTE — ED PROVIDER NOTES
Time reflects when diagnosis was documented in both MDM as applicable and the Disposition within this note       Time User Action Codes Description Comment    5/29/2025 10:54 PM Josafat Betancur [M54.6] Acute left-sided thoracic back pain     5/29/2025 10:54 PM Josafat Betancur [R07.9] Chest pain, unspecified type     5/29/2025 11:06 PM Josafat Betancur [I31.39] Other pericardial effusion (noninflammatory)     5/29/2025 11:06 PM Josafat Betancur [N18.4] Stage 4 chronic kidney disease (HCC)           ED Disposition       ED Disposition   Discharge    Condition   Stable    Date/Time   Thu May 29, 2025 11:06 PM    Comment   Ayleen Moralez discharge to home/self care.                   Assessment & Plan       Medical Decision Making    64 y.o. female presenting for chest pain.  Vitals stable, well-appearing.  Will obtain labs to evaluate for leukocytosis, anemia, electrolyte abnormality or CAM.  Will obtain EKG and troponin to evaluate for arrhythmia or ACS.  No tachycardia or hypoxia.  No DVT symptoms.  GFR would preclude CTA however do not suspect PE.  Lungs clear, appears euvolemic.  Do not suspect CHF.  3/22/2025: Heart is unremarkable for patient's age. No thoracic aortic aneurysm. Do not suspect aortic dissection or aneurysm at this time.  Will check LFTs and lipase to screen for pancreatitis.  No evidence of zoster.  Differential would include musculoskeletal pain.  Will try lidocaine patch.    Reassessment: VSS, remains well appearing.  Reviewed labs with patient in detail.  Continue to suspect MSK etiology.  Given reported Hx of pericardial effusion will provide Rx for outpatient ECHO.  Advised tylenol as needed for pain. Encouraged orthopedics f/u as currently planned.    Disposition: I have discussed with the patient our plan to discharge them from the ED and the patient is in agreement with this plan.     RTED precautions emphasized. The patient was provided a written after visit summary  with strict RTED precautions.     Followup: I have discussed with the patient plan to follow up with their PCP. Contact information provided in AVS.    Amount and/or Complexity of Data Reviewed  Labs: ordered. Decision-making details documented in ED Course.  Radiology: independent interpretation performed.    Risk  Prescription drug management.        ED Course as of 05/30/25 0405   u May 29, 2025   2151 Procedure Note: EKG  Date/Time: 05/29/25 9:51 PM   Interpreted by: Josafat Betancur DO  Indications / Diagnosis: Chest pain  ECG reviewed by me, the ED Provider: yes   The EKG demonstrates:  Rhythm: normal sinus rhythm 99 BPM  Intervals: Normal AR and QT intervals  Axis: Right axis  QRS/Blocks: RBBB  ST Changes: Nonspecific ST abnormality in setting of BBB. No MILO. No TWI.   2237 Creatinine(!): 2.70  Similar to prior       Medications - No data to display      ED Risk Strat Scores   HEART Risk Score      Flowsheet Row Most Recent Value   Heart Score Risk Calculator    History 0 Filed at: 05/29/2025 2239   ECG 1 Filed at: 05/29/2025 2239   Age 1 Filed at: 05/29/2025 2239   Risk Factors 2 Filed at: 05/29/2025 2239   Troponin 0 Filed at: 05/29/2025 2239   HEART Score 4 Filed at: 05/29/2025 2239          HEART Risk Score      Flowsheet Row Most Recent Value   Heart Score Risk Calculator    History 0 Filed at: 05/29/2025 2239   ECG 1 Filed at: 05/29/2025 2239   Age 1 Filed at: 05/29/2025 2239   Risk Factors 2 Filed at: 05/29/2025 2239   Troponin 0 Filed at: 05/29/2025 2239   HEART Score 4 Filed at: 05/29/2025 2239                      No data recorded        SBIRT 22yo+      Flowsheet Row Most Recent Value   Initial Alcohol Screen: US AUDIT-C     1. How often do you have a drink containing alcohol? 0 Filed at: 05/29/2025 2144   2. How many drinks containing alcohol do you have on a typical day you are drinking?  0 Filed at: 05/29/2025 2144   3a. Male UNDER 65: How often do you have five or more drinks on one  occasion? 0 Filed at: 05/29/2025 2144   3b. FEMALE Any Age, or MALE 65+: How often do you have 4 or more drinks on one occassion? 0 Filed at: 05/29/2025 2144   Audit-C Score 0 Filed at: 05/29/2025 2144   ZAYRA: How many times in the past year have you...    Used an illegal drug or used a prescription medication for non-medical reasons? Never Filed at: 05/29/2025 2144                            History of Present Illness       Chief Complaint   Patient presents with    Chest Pain     Left upper back pain that radiates to chest and down left arm that started Monday. Increase sob.        Past Medical History[1]   Past Surgical History[2]   Family History[3]   Social History[4]   E-Cigarette/Vaping    E-Cigarette Use Never User       E-Cigarette/Vaping Substances    Nicotine No     THC No     CBD No     Flavoring No     Other No     Unknown No       I have reviewed and agree with the history as documented.     Ayleen Moralez is a 64 y.o. year old female with PMH of pancreatitis, pericardial effusion, CVA, hyperlipidemia, bipolar disorder presenting to the [facility] ED for back/chest pain. Patient reporting constant, worsening left upper back pain ongoing for 4 days. Pain is worsened with palpation.  She is scheduled to see orthopedics regarding ongoing left shoulder pain. She feels short of breath when laying flat.  No cough.  No dyspnea at rest or with exertion. No fevers or chills. No nausea/vomiting or abdominal pain. No fall or trauma to the area. No leg pain or swelling. Patient has taken tylenol at home for symptomatic treatment.      History provided by:  Medical records and patient   used: No    Chest Pain  Associated symptoms: back pain and shortness of breath    Associated symptoms: no abdominal pain, no cough, no fever, no nausea, no numbness, not vomiting and no weakness        Review of Systems   Constitutional:  Negative for chills and fever.   Respiratory:  Positive for shortness of  breath. Negative for cough.    Cardiovascular:  Positive for chest pain. Negative for leg swelling.   Gastrointestinal:  Negative for abdominal pain, nausea and vomiting.   Musculoskeletal:  Positive for back pain.   Neurological:  Negative for weakness, light-headedness and numbness.   All other systems reviewed and are negative.          Objective       ED Triage Vitals [05/29/25 2145]   Temperature Pulse Blood Pressure Respirations SpO2 Patient Position - Orthostatic VS   98.3 °F (36.8 °C) 105 143/74 20 96 % --      Temp Source Heart Rate Source BP Location FiO2 (%) Pain Score    Oral Monitor -- -- --      Vitals      Date and Time Temp Pulse SpO2 Resp BP Pain Score FACES Pain Rating User   05/29/25 2230 -- 89 -- 20 131/68 -- -- CC   05/29/25 2145 98.3 °F (36.8 °C) 105 96 % 20 143/74 -- -- LK            Physical Exam  Vitals and nursing note reviewed.   Constitutional:       General: She is not in acute distress.     Appearance: Normal appearance. She is well-developed. She is not ill-appearing, toxic-appearing or diaphoretic.   HENT:      Head: Normocephalic and atraumatic.      Nose: No congestion or rhinorrhea.     Eyes:      General:         Right eye: No discharge.         Left eye: No discharge.       Cardiovascular:      Rate and Rhythm: Normal rate and regular rhythm.      Heart sounds: Heart sounds not distant. Murmur heard.      No friction rub.   Pulmonary:      Effort: Pulmonary effort is normal. No accessory muscle usage or respiratory distress.      Breath sounds: Normal breath sounds. No stridor. No decreased breath sounds, wheezing, rhonchi or rales.   Abdominal:      General: Bowel sounds are normal. There is no distension.      Palpations: Abdomen is soft.      Tenderness: There is no abdominal tenderness. There is no guarding or rebound.     Musculoskeletal:      Left shoulder: No swelling, tenderness or bony tenderness. Normal range of motion.      Cervical back: Normal range of motion and  neck supple. No rigidity.      Right lower leg: No tenderness. No edema.      Left lower leg: No tenderness. No edema.     Skin:     Capillary Refill: Capillary refill takes less than 2 seconds.      Coloration: Skin is not pale.      Findings: Rash is not scaling or vesicular.     Neurological:      Mental Status: She is alert and oriented to person, place, and time.     Psychiatric:         Mood and Affect: Mood normal.         Behavior: Behavior normal.         Results Reviewed       Procedure Component Value Units Date/Time    Lipase [[id number]]  (Normal) Collected: 05/29/25 2209    Lab Status: Final result Specimen: Blood from Arm, Right Updated: 05/29/25 2244     Lipase 41 u/L     HS Troponin 0hr (reflex protocol) [[id number]]  (Normal) Collected: 05/29/25 2209    Lab Status: Final result Specimen: Blood from Arm, Right Updated: 05/29/25 2238     hs TnI 0hr 8 ng/L     Comprehensive metabolic panel [[id number]]  (Abnormal) Collected: 05/29/25 2209    Lab Status: Final result Specimen: Blood from Arm, Right Updated: 05/29/25 2237     Sodium 139 mmol/L      Potassium 3.1 mmol/L      Chloride 108 mmol/L      CO2 23 mmol/L      ANION GAP 8 mmol/L      BUN 22 mg/dL      Creatinine 2.70 mg/dL      Glucose 124 mg/dL      Calcium 10.2 mg/dL      AST 18 U/L      ALT 16 U/L      Alkaline Phosphatase 88 U/L      Total Protein 6.0 g/dL      Albumin 3.7 g/dL      Total Bilirubin 0.30 mg/dL      eGFR 18 ml/min/1.73sq m     Narrative:      National Kid[employer] Disease Foundation guidelines for Chronic Kidney Disease (CKD):     Stage 1 with normal or high GFR (GFR > 90 mL/min/1.73 square meters)    Stage 2 Mild CKD (GFR = 60-89 mL/min/1.73 square meters)    Stage 3A Moderate CKD (GFR = 45-59 mL/min/1.73 square meters)    Stage 3B Moderate CKD (GFR = 30-44 mL/min/1.73 square meters)    Stage 4 Severe CKD (GFR = 15-29 mL/min/1.73 square meters)    Stage 5 End Stage CKD (GFR <15 mL/min/1.73 square meters)  Note: GFR calculation is  accurate only with a steady state creatinine    CBC and differential [[id number]]  (Abnormal) Collected: 05/29/25 2209    Lab Status: Final result Specimen: Blood from Arm, Right Updated: 05/29/25 2216     WBC 7.01 Thousand/uL      RBC 3.48 Million/uL      Hemoglobin 11.2 g/dL      Hematocrit 35.7 %       fL      MCH 32.2 pg      MCHC 31.4 g/dL      RDW 12.8 %      MPV 10.0 fL      Platelets 232 Thousands/uL      nRBC 0 /100 WBCs      Segmented % 55 %      Immature Grans % 0 %      Lymphocytes % 28 %      Monocytes % 9 %      Eosinophils Relative 7 %      Basophils Relative 1 %      Absolute Neutrophils 3.87 Thousands/µL      Absolute Immature Grans 0.01 Thousand/uL      Absolute Lymphocytes 1.98 Thousands/µL      Absolute Monocytes 0.63 Thousand/µL      Eosinophils Absolute 0.46 Thousand/µL      Basophils Absolute 0.06 Thousands/µL             XR chest 2 views   ED Interpretation by Josafat Betancur DO (05/29 2253)   No pneumothorax.  No focal infiltrate.  No acute cardiopulmonary disease.          POC Cardiac US    Date/Time: 5/29/2025 10:56 PM    Performed by: Josafat Betancur DO  Authorized by: Josafat Betancur DO    Patient location:  ED  Procedure details:     Exam Type:  Transthoracic Echocardiography (TTE), limited    Purpose of Exam: diagnostic    Indications: chest pain      Assessment / Evaluation for: cardiac function, pericardial effusion and right heart strain (suspected pulmonary embolism)      Exam Type: initial exam      Image quality: diagnostic      Image availability:  Not saved  Patient Details:     Cardiac Rhythm:  Regular    Mechanical ventilation: No    Cardiac findings:     Echo modes evaluated: limited 2D      Views obtained: subcostal short axis      Pericardial effusion: trace      Tamponade physiology: absent      Wall motion: normal      LV systolic function: normal      RV dilation: none    Interpretation:     Fluid Status:  Euvolemic     Trace pericardial effusion.      ED  Medication and Procedure Management   Prior to Admission Medications   Prescriptions Last Dose Informant Patient Reported? Taking?   Cyanocobalamin (VITAMIN B 12 PO)  Self Yes No   Sig: Take 1,000 mcg by mouth in the morning   Multiple Vitamin (MULTI-VITAMIN) tablet  Self No No   Sig: Take 1 tablet by mouth daily   QUEtiapine (SEROquel) 100 mg tablet   No No   Sig: Take 1 tablet (100 mg total) by mouth in the morning   QUEtiapine (SEROquel) 300 mg tablet  Self No No   Sig: Take 1 tablet (300 mg total) by mouth in the morning   QUEtiapine (SEROquel) 400 MG tablet   No No   Sig: Take 1 tablet (400 mg total) by mouth daily at bedtime Take with the Seroquel 100 mg to total 500 mg daily at bedtime   acetaminophen (TYLENOL) 500 mg tablet  Self No No   Sig: Take 2 tablets (1,000 mg total) by mouth every 8 (eight) hours   albuterol (Ventolin HFA) 90 mcg/act inhaler  Self No No   Sig: Inhale 2 puffs every 6 (six) hours as needed for wheezing or shortness of breath   amLODIPine (NORVASC) 5 mg tablet   No No   Sig: Take 1 tablet (5 mg total) by mouth daily   ascorbic acid (VITAMIN C) 500 MG tablet  Self No No   Sig: Take 1 tablet (500 mg total) by mouth 2 (two) times a day   aspirin 81 mg chewable tablet  Self No No   Sig: Chew 1 tablet (81 mg total) daily   budesonide-formoterol (Symbicort) 160-4.5 mcg/act inhaler  Self No No   Sig: Inhale 2 puffs 2 (two) times a day Rinse mouth after use.   calcium carbonate (Calcium 600) 600 MG tablet  Self Yes No   Sig: Take 600 mg by mouth in the morning and 600 mg in the evening. Take with meals.   carvedilol (COREG) 25 mg tablet  Self No No   Sig: TAKE 1 TABLET BY MOUTH TWICE  DAILY WITH MEALS   cholecalciferol (VITAMIN D3) 1,000 units tablet  Self No No   Sig: Take 5 tablets (5,000 Units total) by mouth daily   cholestyramine sugar free (QUESTRAN LIGHT) 4 g packet   No No   Sig: Take 1 packet (4 g total) by mouth 2 (two) times a day   clonazePAM (KlonoPIN) 0.5 mg tablet  Self No No    Sig: Take 1 tablet (0.5 mg total) by mouth 3 (three) times a day   cyclobenzaprine (FLEXERIL) 10 mg tablet   No No   Sig: Take 1 tablet (10 mg total) by mouth daily at bedtime   denosumab (XGEVA) 120 mg/1.7 mL  Self Yes No   Sig: Inject 120 mg under the skin   dicyclomine (BENTYL) 10 mg capsule   No No   Sig: Take 1 capsule (10 mg total) by mouth 4 (four) times a day as needed (abdominal cramping)   ferrous sulfate 324 (65 Fe) mg  Self No No   Sig: Take 1 tablet (324 mg total) by mouth 2 (two) times a day before meals   fluticasone (FLONASE) 50 mcg/act nasal spray  Self No No   Si sprays into each nostril daily as needed for rhinitis (congestion)   fluvoxaMINE (LUVOX) 100 mg tablet   No No   Sig: Fluvoxamine 100m tablet in the morning ; 2 tablets at night   lamoTRIgine (LaMICtal) 200 MG tablet  Self No No   Sig: Lamotrigine 200m tablet in the morning , 1 tablet at night   montelukast (SINGULAIR) 10 mg tablet  Self No No   Sig: Take 1 tablet (10 mg total) by mouth daily at bedtime   omeprazole (PriLOSEC) 40 MG capsule  Self No No   Sig: TAKE 1 CAPSULE BY MOUTH DAILY  BEFORE BREAKFAST   ondansetron (ZOFRAN) 4 mg tablet  Self No No   Sig: Take 1 tablet (4 mg total) by mouth every 8 (eight) hours as needed for nausea or vomiting   rosuvastatin (CRESTOR) 10 MG tablet  Self No No   Sig: Take 1 tablet (10 mg total) by mouth every evening   traMADol (ULTRAM) 50 mg tablet   No No   Sig: Take 1 tablet (50 mg total) by mouth 2 (two) times a day as needed for moderate pain      Facility-Administered Medications: None     Discharge Medication List as of 2025 11:07 PM        CONTINUE these medications which have NOT CHANGED    Details   acetaminophen (TYLENOL) 500 mg tablet Take 2 tablets (1,000 mg total) by mouth every 8 (eight) hours, Starting Mon 2024, Normal      albuterol (Ventolin HFA) 90 mcg/act inhaler Inhale 2 puffs every 6 (six) hours as needed for wheezing or shortness of breath, Starting Thu  4/27/2023, Normal      amLODIPine (NORVASC) 5 mg tablet Take 1 tablet (5 mg total) by mouth daily, Starting Wed 5/28/2025, Normal      ascorbic acid (VITAMIN C) 500 MG tablet Take 1 tablet (500 mg total) by mouth 2 (two) times a day, Starting Thu 1/18/2024, Normal      aspirin 81 mg chewable tablet Chew 1 tablet (81 mg total) daily, Starting Tue 3/11/2025, Normal      budesonide-formoterol (Symbicort) 160-4.5 mcg/act inhaler Inhale 2 puffs 2 (two) times a day Rinse mouth after use., Starting Thu 8/10/2023, Normal      calcium carbonate (Calcium 600) 600 MG tablet Take 600 mg by mouth in the morning and 600 mg in the evening. Take with meals., Historical Med      carvedilol (COREG) 25 mg tablet TAKE 1 TABLET BY MOUTH TWICE  DAILY WITH MEALS, Starting Thu 3/13/2025, Normal      cholecalciferol (VITAMIN D3) 1,000 units tablet Take 5 tablets (5,000 Units total) by mouth daily, Starting Mon 1/31/2022, Normal      cholestyramine sugar free (QUESTRAN LIGHT) 4 g packet Take 1 packet (4 g total) by mouth 2 (two) times a day, Starting Fri 5/9/2025, Normal      clonazePAM (KlonoPIN) 0.5 mg tablet Take 1 tablet (0.5 mg total) by mouth 3 (three) times a day, Starting Wed 4/9/2025, Normal      Cyanocobalamin (VITAMIN B 12 PO) Take 1,000 mcg by mouth in the morning, Historical Med      cyclobenzaprine (FLEXERIL) 10 mg tablet Take 1 tablet (10 mg total) by mouth daily at bedtime, Starting Thu 5/29/2025, Normal      denosumab (XGEVA) 120 mg/1.7 mL Inject 120 mg under the skin, Historical Med      dicyclomine (BENTYL) 10 mg capsule Take 1 capsule (10 mg total) by mouth 4 (four) times a day as needed (abdominal cramping), Starting Thu 5/29/2025, Normal      ferrous sulfate 324 (65 Fe) mg Take 1 tablet (324 mg total) by mouth 2 (two) times a day before meals, Starting Thu 1/18/2024, Normal      fluticasone (FLONASE) 50 mcg/act nasal spray 2 sprays into each nostril daily as needed for rhinitis (congestion), Starting Fri 2/14/2025,  Normal      fluvoxaMINE (LUVOX) 100 mg tablet Fluvoxamine 100m tablet in the morning ; 2 tablets at night, Normal      lamoTRIgine (LaMICtal) 200 MG tablet Lamotrigine 200m tablet in the morning , 1 tablet at night, Normal      montelukast (SINGULAIR) 10 mg tablet Take 1 tablet (10 mg total) by mouth daily at bedtime, Starting e 2024, Normal      Multiple Vitamin (MULTI-VITAMIN) tablet Take 1 tablet by mouth daily, Starting Thu 2024, Normal      omeprazole (PriLOSEC) 40 MG capsule TAKE 1 CAPSULE BY MOUTH DAILY  BEFORE BREAKFAST, Starting Mon 2024, Normal      ondansetron (ZOFRAN) 4 mg tablet Take 1 tablet (4 mg total) by mouth every 8 (eight) hours as needed for nausea or vomiting, Starting Fri 3/28/2025, Normal      !! QUEtiapine (SEROquel) 100 mg tablet Take 1 tablet (100 mg total) by mouth in the morning, Starting Tue 4/15/2025, Normal      !! QUEtiapine (SEROquel) 300 mg tablet Take 1 tablet (300 mg total) by mouth in the morning, Starting Tue 2025, Normal      !! QUEtiapine (SEROquel) 400 MG tablet Take 1 tablet (400 mg total) by mouth daily at bedtime Take with the Seroquel 100 mg to total 500 mg daily at bedtime, Starting Tue 4/15/2025, Normal      rosuvastatin (CRESTOR) 10 MG tablet Take 1 tablet (10 mg total) by mouth every evening, Starting Thu 2025, Normal      traMADol (ULTRAM) 50 mg tablet Take 1 tablet (50 mg total) by mouth 2 (two) times a day as needed for moderate pain, Starting Thu 2025, Normal       !! - Potential duplicate medications found. Please discuss with provider.        Outpatient Discharge Orders   Echo complete w/ contrast if indicated   Standing Status: Future Standing Exp. Date: 29     ED SEPSIS DOCUMENTATION   Time reflects when diagnosis was documented in both MDM as applicable and the Disposition within this note       Time User Action Codes Description Comment    2025 10:54 PM Josafat Betancur Add [M54.6] Acute left-sided thoracic  back pain     2025 10:54 PM Josafat Betancur [R07.9] Chest pain, unspecified type     2025 11:06 PM Josafat Betancur [I31.39] Other pericardial effusion (noninflammatory)     2025 11:06 PM Josafat Betancur [N18.4] Stage 4 chronic kidney disease (HCC)                      [1]   Past Medical History:  Diagnosis Date    Aftercare following left knee joint replacement surgery 02/15/2024    Anxiety     Anxiety disorder     Arthritis     At risk for falls     Bipolar 2 disorder (HCC)     Chronic back pain     Chronic kidney disease     Closed fracture of distal end of right fibula with routine healing 2020    COVID-19     in 2021    CVA (cerebral vascular accident) (HCC)     noted on MRI in the past    Depression     GERD (gastroesophageal reflux disease)     Hypercholesteremia     Hypernatremia     Hypertension     Hypokalemia     Idiopathic chronic pancreatitis (HCC) 2018    Intervertebral disc disorder with radiculopathy of lumbosacral region     resolved: 2015    Limb alert care status     LUE-fistula    Panic attacks     Pericardial effusion     PONV (postoperative nausea and vomiting)     Psychiatric problem     Radiculitis     resolved: 2015    Secondary renal hyperparathyroidism (HCC)     Stroke (HCC)     Vitamin D deficiency    [2]   Past Surgical History:  Procedure Laterality Date    BUNIONECTOMY      Left foot     CAST APPLICATION Right 2022    Procedure: Application short-arm thumb spica splint;  Surgeon: Lyndon Victoria MD;  Location:  MAIN OR;  Service: Orthopedics    COLON SURGERY      COLONOSCOPY  2018    COLONOSCOPY  2021    DILATION AND CURETTAGE OF UTERUS      INDUCED       surgically induced    WI ARTERIOVENOUS ANASTOMOSIS OPEN DIRECT Left 2019    Procedure: CREATION FISTULA ARTERIOVENOUS (AV) left wrists possible left upper;  Surgeon: Andrey Quintero MD;  Location:  MAIN OR;  Service: Vascular    WI ARTHRP KNRUSS  CONDYLE&PLATU MEDIAL&LAT COMPARTMENTS Left 02/05/2024    Procedure: ARTHROPLASTY KNEE TOTAL SAME DAY;  Surgeon: Riki Ma MD;  Location: [facility];  Service: Orthopedics    MD CORRJ HLX VLGS BNCTY SESMDC DSTL METAR OSTEOT Left 07/01/2019    Procedure: Serge bunionectomy;  Surgeon: Munir Larkin DPM;  Location: [facility];  Service: Podiatry    MD CORRJ HLX VLGS BNCTY SESMDC DSTL METAR OSTEOT Right 08/03/2020    Procedure: BUNIONECTOMY RAFAEL;  Surgeon: Munir Larkin DPM;  Location: [facility];  Service: Podiatry    MD CORRJ HLX VLGS BNCTY SESMDC PROX PHLX OSTEOT Right 09/27/2021    Procedure: BUNIONECTOMY TAMEKA, right tameka osteotomy and 2nd claw toe correction;  Surgeon: James R Lachman, MD;  Location: [facility];  Service: Orthopedics    MD ERCP DX COLLECTION SPECIMEN BRUSHING/WASHING N/A 04/11/2018    Procedure: ENDOSCOPIC RETROGRADE CHOLANGIOPANCREATOGRAPHY (ERCP);  Surgeon: Alfredo Messina MD;  Location: [facility];  Service: Gastroenterology    MD LAPAROSCOPY PROCTOPEXY PROLAPSE N/A 07/13/2018    Procedure: ROBOTIC SIGMOID RESECTION / RECTOPEXY;  Surgeon: ELPIDIO Mcnulty MD;  Location: [facility];  Service: Colorectal    MD OPEN TREATMENT RADIAL SHAFT FRACTURE W/INT FIXJ Right 10/11/2021    Procedure: OPEN REDUCTION W/ INTERNAL FIXATION (ORIF) RADIUS (WRIST), RIGHT DISTAL;  Surgeon: Riki Ma MD;  Location: [facility];  Service: Orthopedics    MD REMOVAL IMPLANT DEEP Right 06/23/2022    Procedure: Removal of hardware volar aspect right distal radius (distal radial plate and screws);  Surgeon: Lyndon Victoria MD;  Location: [facility];  Service: Orthopedics    MD SIGMOIDOSCOPY FLX DX W/COLLJ SPEC BR/WA IF PFRMD N/A 07/13/2018    Procedure: SIGMOIDOSCOPY FLEXIBLE;  Surgeon: ELPIDIO Mcnulty MD;  [facility]: [facility];  Service: Colorectal    MD TR TDN RESTORE INTRNSC FUNCJ ALL 4 FNGRS Right 06/23/2022    Procedure: Right ring finger flexor digitorum superficialis to flexor pollicis longus  tendon transfer;  Surgeon: Lyndon Victoria MD;  Location: [facility];  Service: Orthopedics    TUBAL LIGATION Bilateral 1997    US GUIDED THYROID BIOPSY  07/30/2019   [3]   Family History  Problem Relation Name Age of Onset    Bipolar disorder Mother      Mental illness Mother          depression    Stroke Mother      Dementia Mother      Colon polyps Mother      Heart disease Father      Hypertension Father      Diabetes Father      Mental illness Sister      Colon polyps Sister      Mental illness Sister      Heart disease Sister      No Known Problems Sister      Breast cancer Sister  68    No Known Problems Maternal Grandmother      No Known Problems Maternal Grandfather      Breast cancer Paternal Grandmother          age unknown    No Known Problems Paternal Grandfather      Hypertension Son      Obesity Son      No Known Problems Son      Breast cancer Maternal Aunt          age unknown    Breast cancer Paternal Aunt          age unknown    Breast cancer Paternal Aunt          age unknown    Diabetes Family      Heart disease Family      Hypertension Family      Stroke Family      Thyroid disease Family      Throat cancer Niece  48    Substance Abuse Neg Hx          neg fam hx    Colon cancer Neg Hx     [4]   Social History  Tobacco Use    Smoking status: Never    Smokeless tobacco: Never   Vaping Use    Vaping status: Never Used   Substance Use Topics    Alcohol use: Not Currently    Drug use: Not Currently     Types: Hydrocodone, Marijuana     Comment: JESSE   Ayleen has h/o marijuana abuse- not currently        Josafat Betancur DO  05/30/25 [id number]

## 2025-05-30 NOTE — TELEPHONE ENCOUNTER
Patient called asking why Dr. Harp prescribed certain medications.  Helped patient, providing answers, which were answered.  KENNETH

## 2025-06-01 ENCOUNTER — RESULTS FOLLOW-UP (OUTPATIENT)
Dept: OTHER | Facility: HOSPITAL | Age: 65
End: 2025-06-01

## 2025-06-01 ENCOUNTER — HOSPITAL ENCOUNTER (OUTPATIENT)
Dept: RADIOLOGY | Facility: HOSPITAL | Age: 65
Discharge: HOME | End: 2025-06-01
Payer: MEDICARE

## 2025-06-01 DIAGNOSIS — M25.512 LEFT SHOULDER PAIN, UNSPECIFIED CHRONICITY: ICD-10-CM

## 2025-06-01 PROCEDURE — 73030 X-RAY EXAM OF SHOULDER: CPT

## 2025-06-02 ENCOUNTER — OFFICE VISIT (OUTPATIENT)
Dept: OBGYN CLINIC | Facility: CLINIC | Age: 65
End: 2025-06-02
Payer: MEDICARE

## 2025-06-02 ENCOUNTER — HOSPITAL ENCOUNTER (OUTPATIENT)
Dept: CT IMAGING | Facility: HOSPITAL | Age: 65
Discharge: HOME | End: 2025-06-02
Attending: FAMILY MEDICINE
Payer: MEDICARE

## 2025-06-02 DIAGNOSIS — M54.50 THORACOLUMBAR BACK PAIN: ICD-10-CM

## 2025-06-02 DIAGNOSIS — M25.512 LEFT SHOULDER PAIN, UNSPECIFIED CHRONICITY: ICD-10-CM

## 2025-06-02 DIAGNOSIS — Z87.39 HISTORY OF OSTEOPOROSIS: ICD-10-CM

## 2025-06-02 DIAGNOSIS — M54.50 THORACOLUMBAR BACK PAIN: Primary | ICD-10-CM

## 2025-06-02 DIAGNOSIS — M54.6 THORACOLUMBAR BACK PAIN: Primary | ICD-10-CM

## 2025-06-02 DIAGNOSIS — M54.6 THORACOLUMBAR BACK PAIN: ICD-10-CM

## 2025-06-02 PROCEDURE — 72131 CT LUMBAR SPINE W/O DYE: CPT

## 2025-06-02 PROCEDURE — 72128 CT CHEST SPINE W/O DYE: CPT

## 2025-06-02 PROCEDURE — 99204 OFFICE O/P NEW MOD 45 MIN: CPT | Performed by: FAMILY MEDICINE

## 2025-06-02 RX ORDER — METHYLPREDNISOLONE 4 MG/1
TABLET ORAL
Qty: 21 TABLET | Refills: 0 | Status: SHIPPED | OUTPATIENT
Start: 2025-06-02 | End: 2025-06-11 | Stop reason: ALTCHOICE

## 2025-06-02 NOTE — PROGRESS NOTES
Name: Ayleen Moralez      : 1960      MRN: [id number]  Encounter Provider: Alfredo Almaraz III, DO  Encounter Date: 2025   Encounter department: [facility]  :  Assessment & Plan  Left shoulder pain, unspecified chronicity    Orders:  •  XR shoulder 2+ vw left; Future    Thoracolumbar back pain  Complex complaint of upper back and chest pain  Thoracic or lumbar diffuse atraumatic  Past med history significant for chronic kidney disease with osteoporosis    Differential diagnosis:  Unclear etiology  Occult compression fracture of the vertebra  Costochondritis    Recommended follow-up with primary care physician to discuss costochondritis and other causes.  She recently did have investigation in the ER ruling out any acute coronary syndrome.  I reviewed precautions for heart attack including severe onset sudden chest pain or shortness of breath and recommend the ER visit if occurs.  Patient expressed understanding and agreed the plan.  Orders:  •  CT spine thoracic and lumbar wo contrast; Future  •  methylPREDNISolone 4 MG tablet therapy pack; Use as directed on package    History of osteoporosis    Orders:  •  CT spine thoracic and lumbar wo contrast; Future        History of Present Illness   HPI  Ayleen Moralez is a 64 y.o. female who presents   Past history significant for severe kidney disease GFR of 18 with a creatinine of 2.7    Patient complains of 1 to 2 weeks of left upper back pain which radiates to the axilla to the left side of the chest.  She describes constant pain even when sitting and not exerting herself.  She does have relief with lying down.  She presented to the ER on 2025 where she had EKG as well troponins done demonstrating no evidence of acute coronary syndrome.  In addition, patient has pain with movement of the arm and shoulder.  She also has pain on deep breath.  CT angiogram was not performed in the ER per note due to  renal function.  She did have a chest x-ray showing no acute cardiopulmonary process.    She denies any cough, shortness of breath      Review of Systems       Objective   LMP  (LMP Unknown)      Physical Exam    Cervical  ROM: intact  Midline spinous process tenderness: + Diffuse nonspecific tenderness thoracic and lumbar spine  Muscular Tenderness: + Left thoracolumbar paraspinal muscular tenderness  Sensation UE Bilateral:  C5: normal  C6: normal  C7: normal  C8: normal  T1: normal  Strength UE: 5/5 elbow, wrist, fingers bilateral    BACK EXAM:  BACK TENDERNESS:  Spinous Processes: no  Paraspinal Muscles: no    DERMATOMAL SENSATION:  L1: normal   L2: normal   L3: normal   L4: normal   L5: normal   S1: normal    STRENGTH (bilateral):  Knee Extension: 5/5  Foot Dorsiflexion: 5/5  Great Toe Extension: 5/5  Foot Plantarflexion: 5/5  Hip Flexion: 5/5  Hip Abduction: 5/5    LEFT SHOULDER:    Tenderness: None    ROM  Touchdown sign: intact  External Rotation: intact  Internal Rotation: intact    Strength  Abduction: 5/5  ER: 5/5  IR: 5/5    Drop-Arm: negative  Emptycan: negative    ___________________________________________________________________    Return for Follow-up after advanced imaging.  ____________________________________________________________________    IMAGING STUDIES: (I personally reviewed images in PACS, and if available-report):         PAST REPORTS:  X-ray chest 5/29/2025: No acute cardiopulmonary disease  X-ray left shoulder 6/1/2025: Mild glenohumeral arthritis  ____________________________________________________________________    Procedures  ____________________________________________________________________    Past Medical History[1]  Past Surgical History[2]  Social History   Social History     Substance and Sexual Activity   Alcohol Use Not Currently     Social History     Substance and Sexual Activity   Drug Use Not Currently   • Types: Hydrocodone, Marijuana    Comment: MEDICATION   Ayleen  has h/o marijuana abuse- not currently     Tobacco Use History[3]  Family History[4]  Allergies[5]  ____________________________________________________________________    Patient instructions below verbally summarized in person during encounter:  Patient Instructions   I explained the patient that the cause of her pain is of unclear etiology.  I recommended stat CT scan to rule out compression fracture as she has a history of osteoporosis associated chronic kidney disease and is at high risk for occult vertebral fracture.  In addition, she also has some symptomatology consistent with costochondritis or inflammation of the rib cage.  We will follow-up after her CT scan.  Due to her chronic kidney disease we will avoid any nonsteroidal anti-inflammatories.  She has no diabetes and for pain control I recommended Tylenol as well as prescribed a Medrol dose steroid pack.                   [1]  Past Medical History:  Diagnosis Date   • Aftercare following left knee joint replacement surgery 02/15/2024   • Anxiety    • Anxiety disorder    • Arthritis    • At risk for falls    • Bipolar 2 disorder (HCC)    • Chronic back pain    • Chronic kidney disease    • Closed fracture of distal end of right fibula with routine healing 11/04/2020   • COVID-19     in Jan 2021   • CVA (cerebral vascular accident) ([facility])     noted on MRI in the past   • Depression    • GERD (gastroesophageal reflux disease)    • Hypercholesteremia    • Hypernatremia    • Hypertension    • Hypokalemia    • Idiopathic chronic pancreatitis (HCC) 03/20/2018   • Intervertebral disc disorder with radiculopathy of lumbosacral region     resolved: 12/28/2015   • Limb alert care status     LUE-fistula   • Panic attacks    • Pericardial effusion    • PONV (postoperative nausea and vomiting)    • Psychiatric problem    • Radiculitis     resolved: 12/28/2015   • Secondary renal hyperparathyroidism (HCC)    • Stroke ([facility])    • Vitamin D deficiency    [2]  Past  Surgical History:  Procedure Laterality Date   • BUNIONECTOMY      Left foot    • CAST APPLICATION Right 2022    Procedure: Application short-arm thumb spica splint;  Surgeon: Lyndon Victoria MD;  Location: [facility];  Service: Orthopedics   • COLON SURGERY     • COLONOSCOPY  2018   • COLONOSCOPY  2021   • DILATION AND CURETTAGE OF UTERUS     • INDUCED       surgically induced   • WY ARTERIOVENOUS ANASTOMOSIS OPEN DIRECT Left 2019    Procedure: CREATION FISTULA ARTERIOVENOUS (AV) left wrists possible left upper;  Surgeon: Andrey Quintero MD;  Location: [facility];  Service: Vascular   • WY ARTHRP KNE CONDYLE&PLATU MEDIAL&LAT COMPARTMENTS Left 2024    Procedure: ARTHROPLASTY KNEE TOTAL SAME DAY;  Surgeon: Riki Ma MD;  Location: [facility];  Service: Orthopedics   • WY CORRJ HLX VLGS BNCTY SESMDC DSTL METAR OSTEOT Left 2019    Procedure: Serge bunionectomy;  Surgeon: Munir Larkin DPM;  Location: [facility];  Service: Podiatry   • WY CORRJ HLX VLGS BNCTY SESMDC DSTL METAR OSTEOT Right 2020    Procedure: BUNIONECTOMY RAFAEL;  Surgeon: Munir Larkin DPM;  Location: [facility];  Service: Podiatry   • WY CORRJ HLX VLGS BNCTY SESMDC PROX PHLX OSTEOT Right 2021    Procedure: BUNIONECTOMY TAMEKA, right tameka osteotomy and 2nd claw toe correction;  Surgeon: James R Lachman, MD;  Location: [facility];  Service: Orthopedics   • WY ERCP DX COLLECTION SPECIMEN BRUSHING/WASHING N/A 2018    Procedure: ENDOSCOPIC RETROGRADE CHOLANGIOPANCREATOGRAPHY (ERCP);  Surgeon: Alfredo Messina MD;  Location: [facility];  Service: Gastroenterology   • WY LAPAROSCOPY PROCTOPEXY PROLAPSE N/A 2018    Procedure: ROBOTIC SIGMOID RESECTION / RECTOPEXY;  Surgeon: ELPIDIO Mcnulty MD;  Location: [facility];  Service: Colorectal   • WY OPEN TREATMENT RADIAL SHAFT FRACTURE W/INT FIXJ Right 10/11/2021    Procedure: OPEN REDUCTION W/ INTERNAL FIXATION (ORIF) RADIUS (WRIST),  RIGHT DISTAL;  Surgeon: Riki Ma MD;  Location: [facility];  Service: Orthopedics   • WV REMOVAL IMPLANT DEEP Right 06/23/2022    Procedure: Removal of hardware volar aspect right distal radius (distal radial plate and screws);  Surgeon: Lyndon Victoria MD;  Location: [facility];  Service: Orthopedics   • WV SIGMOIDOSCOPY FLX DX W/COLLJ SPEC BR/WA IF PFRMD N/A 07/13/2018    Procedure: SIGMOIDOSCOPY FLEXIBLE;  Surgeon: ELPIDIO Mcnulty MD;  [facility];  Service: Colorectal   • WV TR TDN RESTORE INTRNSC FUNCJ ALL 4 FNGRS Right 06/23/2022    Procedure: Right ring finger flexor digitorum superficialis to flexor pollicis longus tendon transfer;  Surgeon: Lyndon Victoria MD;  Location: [facility];  Service: Orthopedics   • TUBAL LIGATION Bilateral 1997   • US GUIDED THYROID BIOPSY  07/30/2019   [3]  Social History  Tobacco Use   Smoking Status Never   Smokeless Tobacco Never   [4]  Family History  Problem Relation Name Age of Onset   • Bipolar disorder Mother     • Mental illness Mother          depression   • Stroke Mother     • Dementia Mother     • Colon polyps Mother     • Heart disease Father     • Hypertension Father     • Diabetes Father     • Mental illness Sister     • Colon polyps Sister     • Mental illness Sister     • Heart disease Sister     • No Known Problems Sister     • Breast cancer Sister  68   • No Known Problems Maternal Grandmother     • No Known Problems Maternal Grandfather     • Breast cancer Paternal Grandmother          age unknown   • No Known Problems Paternal Grandfather     • Hypertension Son     • Obesity Son     • No Known Problems Son     • Breast cancer Maternal Aunt          age unknown   • Breast cancer Paternal Aunt          age unknown   • Breast cancer Paternal Aunt          age unknown   • Diabetes Family     • Heart disease Family     • Hypertension Family     • Stroke Family     • Thyroid disease Family     • Throat cancer Niece  48   • Substance Abuse Neg  Hx          neg fam hx   • Colon cancer Neg Hx     [5]  Allergies  Allergen Reactions   • Molds & Smuts Nasal Congestion   • Bee Pollen Nasal Congestion     Other reaction(s): Nasal Congestion   • Pollen Extract Nasal Congestion

## 2025-06-02 NOTE — RESULT ENCOUNTER NOTE
Hello    Please let the patient know that the MRI shows Bosniak class II cyst.  These are benign cysts that have some complexity.  Based on radiology, no further follow-up is needed.  But, we will still review with our transplant surgeons to make sure they agree  - Please let her know that sometimes from a general standpoint, we would still recommend yearly or every 2-year ultrasound to follow the cyst as they can grow and become more complex but I would defer this to Dr. Lina Mills -can you please review the MRI report.  Would you want the patient to have every 1 to 2-year ultrasound.  If she is transplanted in the future, generally speaking I would end up following the ultrasound every 1 to 2 years after we start following the patient from a post transplant standpoint.  But from a pretransplant standpoint, would defer to you for now    Thank you    np

## 2025-06-02 NOTE — PATIENT INSTRUCTIONS
I explained the patient that the cause of her pain is of unclear etiology.  I recommended stat CT scan to rule out compression fracture as she has a history of osteoporosis associated chronic kidney disease and is at high risk for occult vertebral fracture.  In addition, she also has some symptomatology consistent with costochondritis or inflammation of the rib cage.  We will follow-up after her CT scan.  Due to her chronic kidney disease we will avoid any nonsteroidal anti-inflammatories.  She has no diabetes and for pain control I recommended Tylenol as well as prescribed a Medrol dose steroid pack.

## 2025-06-05 NOTE — RESULT ENCOUNTER NOTE
Ayleen Moralez 1960 - MRI completed. Class II bosniak cyst.   Can you please have AOT review films and pull films for their review      Above message sent to [place] coordinator

## 2025-06-07 NOTE — ASSESSMENT & PLAN NOTE
Follows with Dr. Mills, also following with Dr. Clayton for transplant eval  Baseline creatinine 2-2.5  Most recent creatinine 2.7, slightly above baseline but improved from a creatinine of 3.25 in March. May have slight progression/ new baseline.   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

## 2025-06-07 NOTE — ASSESSMENT & PLAN NOTE
K 3.1 on labs from 5/29, prior was normal   Recommend increasing potassium in diet, repeat labs in 1 week. Will start potassium supplement if still low

## 2025-06-07 NOTE — PROGRESS NOTES
Name: Ayleen Moralez      : 1960      MRN: [id number]  Encounter Provider: Gracie Lino PA-C  Encounter Date: 6/10/2025   Encounter [facility]  :  Assessment & Plan  Stage 4 chronic kidney disease (HCC)  Follows with Dr. Mills, also following with Dr. Clayton for transplant eval  Baseline creatinine 2-2.5  Most recent creatinine 2.7, slightly above baseline but improved from a creatinine of 3.25 in March. May have slight progression/ new baseline.   Most recent albumin creat ratio 63   Recent imaging: MRI with renal cysts-- will follow with renal US every 1-2 years   Does have left radiocephalic fistula, + bruit and thrill  Plan:  Avoid NSAIDs and caution with IV contrast  Continue to monitor blood pressure and glucose to prevent worsening of CKD  Follow up in 3-4 months with labs prior to visit   Essential (primary) hypertension  BP today 118/60  Current regimen: amlodipine 5 mg daily, coreg 25 mg BID,   Patient does not check BP at home- encouraged to do so especially given she is sometimes dizzy with standing up quickly. Also discussed hydration, pt not drinking much water. She will hydrate more and check BP at home. If BP persistently low will d/c amlodipine   Recommend low salt diet and exercise  Avoid hypotension   Moderate aortic stenosis  May need this repaired prior to potential transplant  Following with cardiology   Diabetes insipidus (HCC)  Partial  Previously on amiloride, but held in the setting of worsening sCr and normal sodium   Most recent sNa 139 -- will continue holding for now. Repeat labs as below  Hypokalemia  K 3.1 on labs from , prior was normal   Recommend increasing potassium in diet, repeat labs in 1 week. Will start potassium supplement if still low        Patient Instructions   Avoid all NSAIDs (ibuprofen, Motrin, Aleve, Advil, Naproxen...), but it is okay to take Tylenol as needed for pain  Monitor blood pressure and heart rate at  home  Call or message through [employer] if blood pressure is consistently less than 110/60s  Maintain control of blood pressure and blood sugar levels to prevent the worsening of chronic kidney disease    Please increase potassium in your diet (potatoes, tomatoes, avocados, bananas)  Please go for repeat labs in 1 week  Follow up in 3 months with repeat labs 1-2 weeks prior to your appointment     It was a pleasure evaluating your patient in the office today. Thank you for allowing our team to participate in the care of Ayleen Moralez. Please do not hesitate to contact our team if further issues/questions shall arise in the interim.     History of Present Illness   HPI  Ayelen Moralez is a 64 y.o. female who presents for follow up of CKD IV. She is on the transplant list and has dialysis access. She was seen in the ED for chest pain on 5/29, suspected to be MSK in nature. Her chest pain has since resolved. No other recent illness/ hospitalizations/ changes to her health since then. Denies NSAID use. Overall doing well. Agreeable to go for repeat labs as above after increasing potassium in diet. Also will hydrate more and check home BP. All questions and concerns addressed today. Agreeable to follow up in 3 months.     History obtained from: patient      Review of Systems   Constitutional:  Negative for appetite change.   Respiratory:  Positive for shortness of breath (with exertion). Negative for chest tightness.    Cardiovascular:  Negative for chest pain.   Gastrointestinal:  Negative for abdominal pain, diarrhea, nausea and vomiting.   Genitourinary:  Negative for decreased urine volume, difficulty urinating and dysuria.   Neurological:  Positive for dizziness (with standing up).     Medical History Reviewed by provider this encounter:  Tobacco  Allergies  Meds  Problems  Med Hx  Surg Hx  Fam Hx     .  Medications Ordered Prior to Encounter[1]   Social History[2]     Medications Ordered Prior to  "Encounter[3]  Objective   /60 (BP Location: Right arm, Patient Position: Sitting, Cuff Size: Adult)   Wt 84.4 kg (186 lb)   LMP  (LMP Unknown)   BMI 29.13 kg/m²      Physical Exam  Vitals reviewed.   Constitutional:       Appearance: Normal appearance.     Cardiovascular:      Rate and Rhythm: Normal rate and regular rhythm.   Pulmonary:      Effort: Pulmonary effort is normal. No respiratory distress.      Breath sounds: Normal breath sounds.     Musculoskeletal:      Right lower leg: No edema.      Left lower leg: No edema.     Skin:     General: Skin is warm and dry.     Neurological:      General: No focal deficit present.      Mental Status: She is alert.     Psychiatric:         Mood and Affect: Mood normal.         Behavior: Behavior normal.           Laboratory Results:        Invalid input(s): \"ALBUMIN\"    Results for orders placed or performed during the hospital encounter of 05/29/25   CBC and differential   Result Value Ref Range    WBC 7.01 4.31 - 10.16 Thousand/uL    RBC 3.48 (L) 3.81 - 5.12 Million/uL    Hemoglobin 11.2 (L) 11.5 - 15.4 g/dL    Hematocrit 35.7 34.8 - 46.1 %     (H) 82 - 98 fL    MCH 32.2 26.8 - 34.3 pg    MCHC 31.4 31.4 - 37.4 g/dL    RDW 12.8 11.6 - 15.1 %    MPV 10.0 8.9 - 12.7 fL    Platelets 232 149 - 390 Thousands/uL    nRBC 0 /100 WBCs    Segmented % 55 43 - 75 %    Immature Grans % 0 0 - 2 %    Lymphocytes % 28 14 - 44 %    Monocytes % 9 4 - 12 %    Eosinophils Relative 7 (H) 0 - 6 %    Basophils Relative 1 0 - 1 %    Absolute Neutrophils 3.87 1.85 - 7.62 Thousands/µL    Absolute Immature Grans 0.01 0.00 - 0.20 Thousand/uL    Absolute Lymphocytes 1.98 0.60 - 4.47 Thousands/µL    Absolute Monocytes 0.63 0.17 - 1.22 Thousand/µL    Eosinophils Absolute 0.46 0.00 - 0.61 Thousand/µL    Basophils Absolute 0.06 0.00 - 0.10 Thousands/µL   Comprehensive metabolic panel   Result Value Ref Range    Sodium 139 135 - 147 mmol/L    Potassium 3.1 (L) 3.5 - 5.3 mmol/L    " "Chloride 108 96 - 108 mmol/L    CO2 23 21 - 32 mmol/L    ANION GAP 8 4 - 13 mmol/L    BUN 22 5 - 25 mg/dL    Creatinine 2.70 (H) 0.60 - 1.30 mg/dL    Glucose 124 65 - 140 mg/dL    Calcium 10.2 8.4 - 10.2 mg/dL    AST 18 13 - 39 U/L    ALT 16 7 - 52 U/L    Alkaline Phosphatase 88 34 - 104 U/L    Total Protein 6.0 (L) 6.4 - 8.4 g/dL    Albumin 3.7 3.5 - 5.0 g/dL    Total Bilirubin 0.30 0.20 - 1.00 mg/dL    eGFR 18 ml/min/1.73sq m   HS Troponin 0hr (reflex protocol)   Result Value Ref Range    hs TnI 0hr 8 \"Refer to ACS Flowchart\"- see link ng/L   Lipase   Result Value Ref Range    Lipase 41 11 - 82 u/L   ECG 12 lead   Result Value Ref Range    Ventricular Rate 99 BPM    Atrial Rate 99 BPM    LA Interval 152 ms    QRSD Interval 126 ms    QT Interval 392 ms    QTC Interval 504 ms    P [place] 38 degrees    QRS Axis 202 degrees    T Wave Axis 16 degrees     *Note: Due to a large number of results and/or encounters for the requested time period, some results have not been displayed. A complete set of results can be found in Results Review.            [1]   Current Outpatient Medications on File Prior to Visit   Medication Sig Dispense Refill    acetaminophen (TYLENOL) 500 mg tablet Take 2 tablets (1,000 mg total) by mouth every 8 (eight) hours 60 tablet 0    albuterol (Ventolin HFA) 90 mcg/act inhaler Inhale 2 puffs every 6 (six) hours as needed for wheezing or shortness of breath 8.5 g 3    amLODIPine (NORVASC) 5 mg tablet Take 1 tablet (5 mg total) by mouth daily 90 tablet 0    ascorbic acid (VITAMIN C) 500 MG tablet Take 1 tablet (500 mg total) by mouth 2 (two) times a day 60 tablet 2    aspirin 81 mg chewable tablet Chew 1 tablet (81 mg total) daily 60 tablet 0    budesonide-formoterol (Symbicort) 160-4.5 mcg/act inhaler Inhale 2 puffs 2 (two) times a day Rinse mouth after use. 10.2 g 11    calcium carbonate (Calcium 600) 600 MG tablet Take 600 mg by mouth in the morning and 600 mg in the evening. Take with meals.      " carvedilol (COREG) 25 mg tablet TAKE 1 TABLET BY MOUTH TWICE  DAILY WITH MEALS 180 tablet 1    cholecalciferol (VITAMIN D3) 1,000 units tablet Take 5 tablets (5,000 Units total) by mouth daily 90 tablet 5    cholestyramine sugar free (QUESTRAN LIGHT) 4 g packet Take 1 packet (4 g total) by mouth 2 (two) times a day 60 each 5    clonazePAM (KlonoPIN) 0.5 mg tablet Take 1 tablet (0.5 mg total) by mouth 3 (three) times a day 270 tablet 0    Cyanocobalamin (VITAMIN B 12 PO) Take 1,000 mcg by mouth in the morning      cyclobenzaprine (FLEXERIL) 10 mg tablet Take 1 tablet (10 mg total) by mouth daily at bedtime 30 tablet 0    denosumab (XGEVA) 120 mg/1.7 mL Inject 120 mg under the skin      dicyclomine (BENTYL) 10 mg capsule Take 1 capsule (10 mg total) by mouth 4 (four) times a day as needed (abdominal cramping) 30 capsule 0    ferrous sulfate 324 (65 Fe) mg Take 1 tablet (324 mg total) by mouth 2 (two) times a day before meals 60 tablet 2    fluticasone (FLONASE) 50 mcg/act nasal spray 2 sprays into each nostril daily as needed for rhinitis (congestion) 15.8 mL 0    fluvoxaMINE (LUVOX) 100 mg tablet Fluvoxamine 100m tablet in the morning ; 2 tablets at night 270 tablet 2    lamoTRIgine (LaMICtal) 200 MG tablet Lamotrigine 200m tablet in the morning , 1 tablet at night 180 tablet 0    methylPREDNISolone 4 MG tablet therapy pack Use as directed on package 21 tablet 0    montelukast (SINGULAIR) 10 mg tablet Take 1 tablet (10 mg total) by mouth daily at bedtime 30 tablet 5    Multiple Vitamin (MULTI-VITAMIN) tablet Take 1 tablet by mouth daily 30 tablet 2    omeprazole (PriLOSEC) 40 MG capsule TAKE 1 CAPSULE BY MOUTH DAILY  BEFORE BREAKFAST 90 capsule 3    ondansetron (ZOFRAN) 4 mg tablet Take 1 tablet (4 mg total) by mouth every 8 (eight) hours as needed for nausea or vomiting 30 tablet 5    QUEtiapine (SEROquel) 100 mg tablet Take 1 tablet (100 mg total) by mouth in the morning 90 tablet 3    QUEtiapine (SEROquel)  300 mg tablet Take 1 tablet (300 mg total) by mouth in the morning 90 tablet 0    QUEtiapine (SEROquel) 400 MG tablet Take 1 tablet (400 mg total) by mouth daily at bedtime Take with the Seroquel 100 mg to total 500 mg daily at bedtime 90 tablet 3    rosuvastatin (CRESTOR) 10 MG tablet Take 1 tablet (10 mg total) by mouth every evening 90 tablet 3    traMADol (ULTRAM) 50 mg tablet Take 1 tablet (50 mg total) by mouth 2 (two) times a day as needed for moderate pain 60 tablet 0     No current facility-administered medications on file prior to visit.   [2]   Social History  Tobacco Use    Smoking status: Never    Smokeless tobacco: Never   Vaping Use    Vaping status: Never Used   Substance and Sexual Activity    Alcohol use: Not Currently    Drug use: Not Currently     Types: Hydrocodone, Marijuana     Comment: JESSE Abbasi has h/o marijuana abuse- not currently    Sexual activity: Not Currently     Partners: Male     Birth control/protection: Post-menopausal   [3]   Current Outpatient Medications on File Prior to Visit   Medication Sig Dispense Refill    acetaminophen (TYLENOL) 500 mg tablet Take 2 tablets (1,000 mg total) by mouth every 8 (eight) hours 60 tablet 0    albuterol (Ventolin HFA) 90 mcg/act inhaler Inhale 2 puffs every 6 (six) hours as needed for wheezing or shortness of breath 8.5 g 3    amLODIPine (NORVASC) 5 mg tablet Take 1 tablet (5 mg total) by mouth daily 90 tablet 0    ascorbic acid (VITAMIN C) 500 MG tablet Take 1 tablet (500 mg total) by mouth 2 (two) times a day 60 tablet 2    aspirin 81 mg chewable tablet Chew 1 tablet (81 mg total) daily 60 tablet 0    budesonide-formoterol (Symbicort) 160-4.5 mcg/act inhaler Inhale 2 puffs 2 (two) times a day Rinse mouth after use. 10.2 g 11    calcium carbonate (Calcium 600) 600 MG tablet Take 600 mg by mouth in the morning and 600 mg in the evening. Take with meals.      carvedilol (COREG) 25 mg tablet TAKE 1 TABLET BY MOUTH TWICE  DAILY WITH MEALS  180 tablet 1    cholecalciferol (VITAMIN D3) 1,000 units tablet Take 5 tablets (5,000 Units total) by mouth daily 90 tablet 5    cholestyramine sugar free (QUESTRAN LIGHT) 4 g packet Take 1 packet (4 g total) by mouth 2 (two) times a day 60 each 5    clonazePAM (KlonoPIN) 0.5 mg tablet Take 1 tablet (0.5 mg total) by mouth 3 (three) times a day 270 tablet 0    Cyanocobalamin (VITAMIN B 12 PO) Take 1,000 mcg by mouth in the morning      cyclobenzaprine (FLEXERIL) 10 mg tablet Take 1 tablet (10 mg total) by mouth daily at bedtime 30 tablet 0    denosumab (XGEVA) 120 mg/1.7 mL Inject 120 mg under the skin      dicyclomine (BENTYL) 10 mg capsule Take 1 capsule (10 mg total) by mouth 4 (four) times a day as needed (abdominal cramping) 30 capsule 0    ferrous sulfate 324 (65 Fe) mg Take 1 tablet (324 mg total) by mouth 2 (two) times a day before meals 60 tablet 2    fluticasone (FLONASE) 50 mcg/act nasal spray 2 sprays into each nostril daily as needed for rhinitis (congestion) 15.8 mL 0    fluvoxaMINE (LUVOX) 100 mg tablet Fluvoxamine 100m tablet in the morning ; 2 tablets at night 270 tablet 2    lamoTRIgine (LaMICtal) 200 MG tablet Lamotrigine 200m tablet in the morning , 1 tablet at night 180 tablet 0    methylPREDNISolone 4 MG tablet therapy pack Use as directed on package 21 tablet 0    montelukast (SINGULAIR) 10 mg tablet Take 1 tablet (10 mg total) by mouth daily at bedtime 30 tablet 5    Multiple Vitamin (MULTI-VITAMIN) tablet Take 1 tablet by mouth daily 30 tablet 2    omeprazole (PriLOSEC) 40 MG capsule TAKE 1 CAPSULE BY MOUTH DAILY  BEFORE BREAKFAST 90 capsule 3    ondansetron (ZOFRAN) 4 mg tablet Take 1 tablet (4 mg total) by mouth every 8 (eight) hours as needed for nausea or vomiting 30 tablet 5    QUEtiapine (SEROquel) 100 mg tablet Take 1 tablet (100 mg total) by mouth in the morning 90 tablet 3    QUEtiapine (SEROquel) 300 mg tablet Take 1 tablet (300 mg total) by mouth in the morning 90 tablet 0     QUEtiapine (SEROquel) 400 MG tablet Take 1 tablet (400 mg total) by mouth daily at bedtime Take with the Seroquel 100 mg to total 500 mg daily at bedtime 90 tablet 3    rosuvastatin (CRESTOR) 10 MG tablet Take 1 tablet (10 mg total) by mouth every evening 90 tablet 3    traMADol (ULTRAM) 50 mg tablet Take 1 tablet (50 mg total) by mouth 2 (two) times a day as needed for moderate pain 60 tablet 0     No current facility-administered medications on file prior to visit.

## 2025-06-07 NOTE — ASSESSMENT & PLAN NOTE
BP today 118/60  Current regimen: amlodipine 5 mg daily, coreg 25 mg BID,   Patient does not check BP at home- encouraged to do so especially given she is sometimes dizzy with standing up quickly. Also discussed hydration, pt not drinking much water. She will hydrate more and check BP at home. If BP persistently low will d/c amlodipine   Recommend low salt diet and exercise  Avoid hypotension

## 2025-06-10 ENCOUNTER — OFFICE VISIT (OUTPATIENT)
Dept: NEPHROLOGY | Facility: HOSPITAL | Age: 65
End: 2025-06-10
Payer: MEDICARE

## 2025-06-10 VITALS — WEIGHT: 186 LBS | SYSTOLIC BLOOD PRESSURE: 118 MMHG | BODY MASS INDEX: 29.13 KG/M2 | DIASTOLIC BLOOD PRESSURE: 60 MMHG

## 2025-06-10 DIAGNOSIS — I35.0 MODERATE AORTIC STENOSIS: ICD-10-CM

## 2025-06-10 DIAGNOSIS — E87.6 HYPOKALEMIA: ICD-10-CM

## 2025-06-10 DIAGNOSIS — N25.81 SECONDARY HYPERPARATHYROIDISM OF RENAL ORIGIN (HCC): ICD-10-CM

## 2025-06-10 DIAGNOSIS — E23.2 DIABETES INSIPIDUS (HCC): ICD-10-CM

## 2025-06-10 DIAGNOSIS — T82.898S STEAL SYNDROME AS COMPLICATION OF DIALYSIS ACCESS, SEQUELA: ICD-10-CM

## 2025-06-10 DIAGNOSIS — I10 ESSENTIAL (PRIMARY) HYPERTENSION: Primary | ICD-10-CM

## 2025-06-10 DIAGNOSIS — N18.4 STAGE 4 CHRONIC KIDNEY DISEASE (HCC): ICD-10-CM

## 2025-06-10 PROCEDURE — 99214 OFFICE O/P EST MOD 30 MIN: CPT

## 2025-06-10 NOTE — PATIENT INSTRUCTIONS
Avoid all NSAIDs (ibuprofen, Motrin, Aleve, Advil, Naproxen...), but it is okay to take Tylenol as needed for pain  Monitor blood pressure and heart rate at home  Call or message through [email or web address] if blood pressure is consistently less than 110/60s  Maintain control of blood pressure and blood sugar levels to prevent the worsening of chronic kidney disease    Please increase potassium in your diet (potatoes, tomatoes, avocados, bananas)  Please go for repeat labs in 1 week  Follow up in 3 months with repeat labs 1-2 weeks prior to your appointment

## 2025-06-11 ENCOUNTER — OFFICE VISIT (OUTPATIENT)
Dept: FAMILY MEDICINE CLINIC | Facility: HOSPITAL | Age: 65
End: 2025-06-11
Payer: MEDICARE

## 2025-06-11 VITALS
WEIGHT: 185 LBS | SYSTOLIC BLOOD PRESSURE: 124 MMHG | HEART RATE: 74 BPM | HEIGHT: 67 IN | OXYGEN SATURATION: 98 % | BODY MASS INDEX: 29.03 KG/M2 | DIASTOLIC BLOOD PRESSURE: 68 MMHG

## 2025-06-11 DIAGNOSIS — F11.20 OPIOID DEPENDENCE, UNCOMPLICATED (HCC): ICD-10-CM

## 2025-06-11 DIAGNOSIS — M51.16 INTERVERTEBRAL DISC DISORDERS WITH RADICULOPATHY, LUMBAR REGION: ICD-10-CM

## 2025-06-11 DIAGNOSIS — F31.9 BIPOLAR 1 DISORDER (HCC): ICD-10-CM

## 2025-06-11 DIAGNOSIS — M62.81 GENERALIZED MUSCLE WEAKNESS: ICD-10-CM

## 2025-06-11 DIAGNOSIS — M54.50 ACUTE BILATERAL LOW BACK PAIN WITHOUT SCIATICA: ICD-10-CM

## 2025-06-11 DIAGNOSIS — F31.31 BIPOLAR AFFECTIVE DISORDER, CURRENTLY DEPRESSED, MILD (HCC): ICD-10-CM

## 2025-06-11 DIAGNOSIS — Z00.00 MEDICARE ANNUAL WELLNESS VISIT, SUBSEQUENT: Primary | ICD-10-CM

## 2025-06-11 DIAGNOSIS — F31.10 BIPOLAR I DISORDER, MOST RECENT EPISODE MANIC (HCC): ICD-10-CM

## 2025-06-11 DIAGNOSIS — I35.0 MODERATE AORTIC STENOSIS: ICD-10-CM

## 2025-06-11 PROBLEM — M21.371 RIGHT FOOT DROP: Status: RESOLVED | Noted: 2024-12-19 | Resolved: 2025-06-11

## 2025-06-11 PROBLEM — F31.2 BIPOLAR DISORDER, CURRENT EPISODE MANIC SEVERE WITH PSYCHOTIC FEATURES (HCC): Status: RESOLVED | Noted: 2024-02-08 | Resolved: 2025-06-11

## 2025-06-11 PROCEDURE — G0439 PPPS, SUBSEQ VISIT: HCPCS | Performed by: INTERNAL MEDICINE

## 2025-06-11 NOTE — ASSESSMENT & PLAN NOTE
Stable on current meds   Looking forward to starting new job next week at [employer]-hoping to get out  and spend time with  public.  Sister has raised her rent to $800

## 2025-06-11 NOTE — PROGRESS NOTES
Name: Ayleen Moralez      : 1960      MRN: [id number]  Encounter Provider: Bette Harp DO  Encounter Date: 2025   Encounter department: [facility] PRIMARY CARE [address]  :  Assessment & Plan  Medicare annual wellness visit, subsequent         Intervertebral disc disorders with radiculopathy, lumbar region    Orders:  •  Ambulatory referral to Spine & Pain Management; Future    Bipolar affective disorder, currently depressed, mild (HCC)  Stable on current meds   Looking forward to starting new job next week at [employer]-hoping to get out  and spend time with  public.  Sister has raised her rent to $800       Opioid dependence, uncomplicated (HCC)  Now on tramadol       Acute bilateral low back pain without sciatica         Generalized muscle weakness         Moderate aortic stenosis       Preventive health issues were discussed with patient, and age appropriate screening tests were ordered as noted in patient's After Visit Summary. Personalized health advice and appropriate referrals for health education or preventive services given if needed, as noted in patient's After Visit Summary.    History of Present Illness     Here for medicare wellness   Living with sister   Will give  acp  form   Also asking for more meds for pain- had ct of thoracic and lumbar with Dr. Worthington- has L5-S1       Patient Care Team:  Bette Harp DO as PCP - General (Internal Medicine)  Zahra Sandra MD as PCP - Endocrinology (Endocrinology)  DO Arturo Jay DO Wei-Shen Lin, MD W Terence Reilly, MD as Endoscopist  Selene Ma MD (Vascular Surgery)  Arturo Mills DO (Nephrology)  Denise Clayton MD (Nephrology)  Kaylyn Navarro PA-C as Physician Assistant (Gastroenterology)    Review of Systems   Constitutional:  Negative for chills and fever.   Respiratory:  Negative for cough.    Cardiovascular:  Negative for chest pain and palpitations.   Gastrointestinal:  Negative for  abdominal pain.   Musculoskeletal:  Positive for back pain.        Legs are generally weak   Skin:  Negative for rash.   All other systems reviewed and are negative.    Medical History Reviewed by provider this encounter:  [facility]       Annual Wellness Visit Questionnaire   Ayleen is here for her Subsequent Wellness visit.     Health Risk Assessment:   Patient rates overall health as good. Patient feels that their physical health rating is slightly better. Patient is satisfied with their life. Eyesight was rated as slightly worse. Hearing was rated as slightly better. Patient feels that their emotional and mental health rating is slightly better. Patients states they are often angry. Patient states they are sometimes unusually tired/fatigued. Pain experienced in the last 7 days has been some. Patient's pain rating has been 8/10. Patient states that she has experienced no weight loss or gain in last 6 months.     Depression Screening:   PHQ-9 Score: 4      Fall Risk Screening:   In the past year, patient has experienced: history of falling in past year    Number of falls: 2 or more  Injured during fall?: Yes    Feels unsteady when standing or walking?: Yes    Worried about falling?: Yes      Urinary Incontinence Screening:   Patient has not leaked urine accidently in the last six months.     Home Safety:  Patient has trouble with stairs inside or outside of their home. Patient has working smoke alarms and has working carbon monoxide detector. Home safety hazards include: none.     Nutrition:   Current diet is Regular.     Medications:   Patient is currently taking over-the-counter supplements. OTC medications include: see medication list. Patient is able to manage medications.     Activities of Daily Living (ADLs)/Instrumental Activities of Daily Living (IADLs):   Walk and transfer into and out of bed and chair?: Yes  Dress and groom yourself?: Yes    Bathe or shower yourself?: Yes    Feed yourself? Yes  Do your  laundry/housekeeping?: Yes  Manage your money, pay your bills and track your expenses?: Yes  Make your own meals?: Yes    Do your own shopping?: Yes    Previous Hospitalizations:   Any hospitalizations or ED visits within the last 12 months?: Yes    How many hospitalizations have you had in the last year?: 3-4    Advance Care Planning:   Living will: No    Durable POA for healthcare: No    Advanced directive counseling given: Yes    ACP document given: Yes    End of Life Decisions reviewed with patient: Yes    Provider agrees with end of life decisions: Yes      Cognitive Screening:   Provider or family/friend/caregiver concerned regarding cognition?: No    Preventive Screenings      Cardiovascular Screening:    General: Screening Not Indicated and History Lipid Disorder      Diabetes Screening:     General: Screening Current      Colorectal Cancer Screening:     General: Screening Current      Breast Cancer Screening:     General: Screening Current      Cervical Cancer Screening:    General: Screening Current      Osteoporosis Screening:    General: Screening Not Indicated and History Osteoporosis      Abdominal Aortic Aneurysm (AAA) Screening:        General: Risks and Benefits Discussed      Lung Cancer Screening:     General: Screening Not Indicated      Hepatitis C Screening:    General: Screening Current    Immunizations:  - Immunizations due: Zoster (Shingrix)    Screening, Brief Intervention, and Referral to Treatment (SBIRT)     Screening      AUDIT-C Screenin) How often did you have a drink containing alcohol in the past year? never  2) How many drinks did you have on a typical day when you were drinking in the past year? 0  3) How often did you have 6 or more drinks on one occasion in the past year? never    AUDIT-C Score: 0  Interpretation: Score 0-2 (female): Negative screen for alcohol misuse    Single Item Drug Screening:  How often have you used an illegal drug (including marijuana) or a  "prescription medication for non-medical reasons in the past year? never    Single Item Drug Screen Score: 0  Interpretation: Negative screen for possible drug use disorder    Review of Current Opioid Use    Opioid Risk Tool (ORT) Interpretation: Complete Opioid Risk Tool (ORT)    Social Drivers of Health     Financial Resource Strain: Medium Risk (12/7/2022)    Overall Financial Resource Strain (CARDIA)    • Difficulty of Paying Living Expenses: Somewhat hard   Food Insecurity: Patient Declined (6/11/2025)    Nursing - Inadequate Food Risk Classification    • Worried About Running Out of Food in the Last Year: Never true    • Ran Out of Food in the Last Year: Never true    • Ran Out of Food in the Last Year: Patient declined   Transportation Needs: No Transportation Needs (6/11/2025)    PRAPARE - Transportation    • Lack of Transportation (Medical): No    • Lack of Transportation (Non-Medical): No   Housing Stability: Patient Unable To Answer (6/11/2025)    Housing Stability Vital Sign    • Unable to Pay for Housing in the Last Year: Patient unable to answer    • Number of Times Moved in the Last Year: 0    • Homeless in the Last Year: Patient unable to answer   Utilities: Not At Risk (6/11/2025)    [facility] Utilities    • Threatened with loss of utilities: No     No results found.    Objective   /68   Pulse 74   Ht 5' 7\" (1.702 m)   Wt 83.9 kg (185 lb)   LMP  (LMP Unknown)   SpO2 98%   BMI 28.98 kg/m²     Physical Exam  Vitals and nursing note reviewed.   Constitutional:       General: She is not in acute distress.     Appearance: She is normal weight.   HENT:      Head: Normocephalic.      Right Ear: Tympanic membrane normal.      Ears:      Comments: Excess cerumen in left ear     Nose: No congestion.      Mouth/Throat:      Pharynx: No posterior oropharyngeal erythema.     Eyes:      General: No scleral icterus.        Right eye: No discharge.         Left eye: No discharge.       Cardiovascular:      " Rate and Rhythm: Normal rate and regular rhythm.      Heart sounds: Murmur heard.      Gallop present.   Pulmonary:      Breath sounds: No wheezing or rhonchi.   Abdominal:      Tenderness: There is no abdominal tenderness. There is no guarding.     Musculoskeletal:      Cervical back: No rigidity.      Right lower leg: No edema.      Left lower leg: No edema.     Skin:     Findings: No erythema.     Neurological:      Mental Status: She is alert.     Psychiatric:         Mood and Affect: Mood normal.         Thought Content: Thought content normal.         Judgment: Judgment normal.

## 2025-06-11 NOTE — PATIENT INSTRUCTIONS
Medicare Preventive Visit Patient Instructions  Thank you for completing your Welcome to Medicare Visit or Medicare Annual Wellness Visit today. Your next wellness visit will be due in one year (6/12/2026).  The screening/preventive services that you may require over the next 5-10 years are detailed below. Some tests may not apply to you based off risk factors and/or age. Screening tests ordered at today's visit but not completed yet may show as past due. Also, please note that scanned in results may not display below.  Preventive Screenings:  Service Recommendations Previous Testing/Comments   Colorectal Cancer Screening  * Colonoscopy    * Fecal Occult Blood Test (FOBT)/Fecal Immunochemical Test (FIT)  * Fecal DNA/Cologuard Test  * Flexible Sigmoidoscopy Age: 45-75 years old   Colonoscopy: every 10 years (may be performed more frequently if at higher risk)  OR  FOBT/FIT: every 1 year  OR  Cologuard: every 3 years  OR  Sigmoidoscopy: every 5 years  Screening may be recommended earlier than age 45 if at higher risk for colorectal cancer. Also, an individualized decision between you and your healthcare provider will decide whether screening between the ages of 76-85 would be appropriate. Colonoscopy: 02/03/2025  FOBT/FIT: Not on file  Cologuard: Not on file  Sigmoidoscopy: 07/13/2018    Screening Current     Breast Cancer Screening Age: 40+ years old  Frequency: every 1-2 years  Not required if history of left and right mastectomy Mammogram: 01/24/2025    Screening Current   Cervical Cancer Screening Between the ages of 21-29, pap smear recommended once every 3 years.   Between the ages of 30-65, can perform pap smear with HPV co-testing every 5 years.   Recommendations may differ for women with a history of total hysterectomy, cervical cancer, or abnormal pap smears in past. Pap Smear: 01/17/2025    Screening Current   Hepatitis C Screening Once for adults born between 1945 and 1965  More frequently in patients at  high risk for Hepatitis C Hep C Antibody: 03/29/2019    Screening Current   Diabetes Screening 1-2 times per year if you're at risk for diabetes or have pre-diabetes Fasting glucose: 108 mg/dL (5/7/2025)  A1C: 6.6 % (11/8/2024)  Screening Current   Cholesterol Screening Once every 5 years if you don't have a lipid disorder. May order more often based on risk factors. Lipid panel: 01/21/2025    Screening Not Indicated  History Lipid Disorder     Other Preventive Screenings Covered by Medicare:  Abdominal Aortic Aneurysm (AAA) Screening: covered once if your at risk. You're considered to be at risk if you have a family history of AAA.  Lung Cancer Screening: covers low dose CT scan once per year if you meet all of the following conditions: (1) Age 55-77; (2) No signs or symptoms of lung cancer; (3) Current smoker or have quit smoking within the last 15 years; (4) You have a tobacco smoking history of at least 20 pack years (packs per day multiplied by number of years you smoked); (5) You get a written order from a healthcare provider.  Glaucoma Screening: covered annually if you're considered high risk: (1) You have diabetes OR (2) Family history of glaucoma OR (3)  aged 50 and older OR (4)  American aged 65 and older  Osteoporosis Screening: covered every 2 years if you meet one of the following conditions: (1) You're estrogen deficient and at risk for osteoporosis based off medical history and other findings; (2) Have a vertebral abnormality; (3) On glucocorticoid therapy for more than 3 months; (4) Have primary hyperparathyroidism; (5) On osteoporosis medications and need to assess response to drug therapy.   Last bone density test (DXA Scan): 12/27/2023.  HIV Screening: covered annually if you're between the age of 15-65. Also covered annually if you are younger than 15 and older than 65 with risk factors for HIV infection. For pregnant patients, it is covered up to 3 times per  pregnancy.    Immunizations:  Immunization Recommendations   Influenza Vaccine Annual influenza vaccination during flu season is recommended for all persons aged >= 6 months who do not have contraindications   Pneumococcal Vaccine   * Pneumococcal conjugate vaccine = PCV13 (Prevnar 13), PCV15 (Vaxneuvance), PCV20 (Prevnar 20)  * Pneumococcal polysaccharide vaccine = PPSV23 (Pneumovax) Adults 19-65 yo with certain risk factors or if 65+ yo  If never received any pneumonia vaccine: recommend Prevnar 20 (PCV20)  Give PCV20 if previously received 1 dose of PCV13 or PPSV23   Hepatitis B Vaccine 3 dose series if at intermediate or high risk (ex: diabetes, end stage renal disease, liver disease)   Respiratory syncytial virus (RSV) Vaccine - COVERED BY MEDICARE PART D  * RSVPreF3 (Arexvy) CDC recommends that adults 60 years of age and older may receive a single dose of RSV vaccine using shared clinical decision-making (SCDM)   Tetanus (Td) Vaccine - COST NOT COVERED BY MEDICARE PART B Following completion of primary series, a booster dose should be given every 10 years to maintain immunity against tetanus. Td may also be given as tetanus wound prophylaxis.   Tdap Vaccine - COST NOT COVERED BY MEDICARE PART B Recommended at least once for all adults. For pregnant patients, recommended with each pregnancy.   Shingles Vaccine (Shingrix) - COST NOT COVERED BY MEDICARE PART B  2 shot series recommended in those 19 years and older who have or will have weakened immune systems or those 50 years and older     Health Maintenance Due:      Topic Date Due   • Breast Cancer Screening: Mammogram  01/24/2026   • Cervical Cancer Screening  01/17/2030   • Colorectal Cancer Screening  02/02/2030   • HIV Screening  Completed   • Hepatitis C Screening  Completed     Immunizations Due:      Topic Date Due   • COVID-19 Vaccine (4 - 2024-25 season) 09/01/2024     Advance Directives   What are advance directives?  Advance directives are legal  documents that state your wishes and plans for medical care. These plans are made ahead of time in case you lose your ability to make decisions for yourself. Advance directives can apply to any medical decision, such as the treatments you want, and if you want to donate organs.   What are the types of advance directives?  There are many types of advance directives, and each state has rules about how to use them. You may choose a combination of any of the following:  Living will:  This is a written record of the treatment you want. You can also choose which treatments you do not want, which to limit, and which to stop at a certain time. This includes surgery, medicine, IV fluid, and tube feedings.   Durable power of  for healthcare (DPAHC):  This is a written record that states who you want to make healthcare choices for you when you are unable to make them for yourself. This person, called a proxy, is usually a family member or a friend. You may choose more than 1 proxy.  Do not resuscitate (DNR) order:  A DNR order is used in case your heart stops beating or you stop breathing. It is a request not to have certain forms of treatment, such as CPR. A DNR order may be included in other types of advance directives.  Medical directive:  This covers the care that you want if you are in a coma, near death, or unable to make decisions for yourself. You can list the treatments you want for each condition. Treatment may include pain medicine, surgery, blood transfusions, dialysis, IV or tube feedings, and a ventilator (breathing machine).  Values history:  This document has questions about your views, beliefs, and how you feel and think about life. This information can help others choose the care that you would choose.  Why are advance directives important?  An advance directive helps you control your care. Although spoken wishes may be used, it is better to have your wishes written down. Spoken wishes can be  misunderstood, or not followed. Treatments may be given even if you do not want them. An advance directive may make it easier for your family to make difficult choices about your care.   Weight Management   Why it is important to manage your weight:  Being overweight increases your risk of health conditions such as heart disease, high blood pressure, type 2 diabetes, and certain types of cancer. It can also increase your risk for osteoarthritis, sleep apnea, and other respiratory problems. Aim for a slow, steady weight loss. Even a small amount of weight loss can lower your risk of health problems.  How to lose weight safely:  A safe and healthy way to lose weight is to eat fewer calories and get regular exercise. You can lose up about 1 pound a week by decreasing the number of calories you eat by 500 calories each day.   Healthy meal plan for weight management:  A healthy meal plan includes a variety of foods, contains fewer calories, and helps you stay healthy. A healthy meal plan includes the following:  Eat whole-grain foods more often.  A healthy meal plan should contain fiber. Fiber is the part of grains, fruits, and vegetables that is not broken down by your body. Whole-grain foods are healthy and provide extra fiber in your diet. Some examples of whole-grain foods are whole-wheat breads and pastas, oatmeal, brown rice, and bulgur.  Eat a variety of vegetables every day.  Include dark, leafy greens such as spinach, kale, lea greens, and mustard greens. Eat yellow and orange vegetables such as carrots, sweet potatoes, and winter squash.   Eat a variety of fruits every day.  Choose fresh or canned fruit (canned in its own juice or light syrup) instead of juice. Fruit juice has very little or no fiber.  Eat low-fat dairy foods.  Drink fat-free (skim) milk or 1% milk. Eat fat-free yogurt and low-fat cottage cheese. Try low-fat cheeses such as mozzarella and other reduced-fat cheeses.  Choose meat and other  protein foods that are low in fat.  Choose beans or other legumes such as split peas or lentils. Choose fish, skinless poultry (chicken or turkey), or lean cuts of red meat (beef or pork). Before you cook meat or poultry, cut off any visible fat.   Use less fat and oil.  Try baking foods instead of frying them. Add less fat, such as margarine, sour cream, regular salad dressing and mayonnaise to foods. Eat fewer high-fat foods. Some examples of high-fat foods include french fries, doughnuts, ice cream, and cakes.  Eat fewer sweets.  Limit foods and drinks that are high in sugar. This includes candy, cookies, regular soda, and sweetened drinks.  Exercise:  Exercise at least 30 minutes per day on most days of the week. Some examples of exercise include walking, biking, dancing, and swimming. You can also fit in more physical activity by taking the stairs instead of the elevator or parking farther away from stores. Ask your healthcare provider about the best exercise plan for you.   Narcotic (Opioid) Safety    Use narcotics safely:  Take prescribed narcotics exactly as directed  Do not give narcotics to others or take narcotics that belong to someone else  Do not mix narcotics without medicines or alcohol  Do not drive or operate heavy machinery after you take the narcotic  Monitor for side effects and notify your healthcare provider if you experienced side effects such as nausea, sleepiness, itching, or trouble thinking clearly.    Manage constipation:    Constipation is the most common side effect of narcotic medicine. Constipation is when you have hard, dry bowel movements, or you go longer than usual between bowel movements. Tell your healthcare provider about all changes in your bowel movements while you are taking narcotics. He or she may recommend laxative medicine to help you have a bowel movement. He or she may also change the kind of narcotic you are taking, or change when you take it. The following are more  ways you can prevent or relieve constipation:    Drink liquids as directed.  You may need to drink extra liquids to help soften and move your bowels. Ask how much liquid to drink each day and which liquids are best for you.  Eat high-fiber foods.  This may help decrease constipation by adding bulk to your bowel movements. High-fiber foods include fruits, vegetables, whole-grain breads and cereals, and beans. Your healthcare provider or dietitian can help you create a high-fiber meal plan. Your provider may also recommend a fiber supplement if you cannot get enough fiber from food.  Exercise regularly.  Regular physical activity can help stimulate your intestines. Walking is a good exercise to prevent or relieve constipation. Ask which exercises are best for you.  Schedule a time each day to have a bowel movement.  This may help train your body to have regular bowel movements. Bend forward while you are on the toilet to help move the bowel movement out. Sit on the toilet for at least 10 minutes, even if you do not have a bowel movement.    Store narcotics safely:   Store narcotics where others cannot easily get them.  Keep them in a locked cabinet or secure area. Do not  keep them in a purse or other bag you carry with you. A person may be looking for something else and find the narcotics.  Make sure narcotics are stored out of the reach of children.  A child can easily overdose on narcotics. Narcotics may look like candy to a small child.    The best way to dispose of narcotics:      The laws vary by country and area. In the [place], the best way is to return the narcotics through a take-back program. This program is offered by the [place] Drug Enforcement Agency (ALICIA). The following are options for using the program:  Take the narcotics to a ALICIA collection site.  The site is often a law enforcement center. Call your local law enforcement center for scheduled take-back days in your area. You will be given  information on where to go if the collection site is in a different location.  Take the narcotics to an approved pharmacy or hospital.  A pharmacy or hospital may be set up as a collection site. You will need to ask if it is a ALICIA collection site if you were not directed there. A pharmacy or doctor's office may not be able to take back narcotics unless it is a ALICIA site.  Use a mail-back system.  This means you are given containers to put the narcotics into. You will then mail them in the containers.  Use a take-back drop box.  This is a place to leave the narcotics at any time. People and animals will not be able to get into the box. Your local law enforcement agency can tell you where to find a drop box in your area.    Other ways to manage pain:   Ask your healthcare provider about non-narcotic medicines to control pain.  Nonprescription medicines include NSAIDs (such as ibuprofen) and acetaminophen. Prescription medicines include muscle relaxers, antidepressants, and steroids.  Pain may be managed without any medicines.  Some ways to relieve pain include massage, aromatherapy, or meditation. Physical or occupational therapy may also help.    For more information:   Drug Enforcement Administration  [address] [place] [address]  Phone: 1- 229 - 513-3702  Web Address: https://www.deadiversion.[facility].gov/drug_disposal/    US Food and Drug Administration  [address] [place] [address]  Phone: 3- 061 - 819-1680  Web Address: http://www.fda.gov   © Copyright [employer] 2018 Information is for End User's use only and may not be sold, redistributed or otherwise used for commercial purposes. All illustrations and images included in CareNotes® are the copyrighted property of A.D.A.M., Inc. or [employer]

## 2025-06-12 RX ORDER — QUETIAPINE FUMARATE 300 MG/1
300 TABLET, FILM COATED ORAL EVERY MORNING
Qty: 90 TABLET | Refills: 3 | OUTPATIENT
Start: 2025-06-12

## 2025-06-12 RX ORDER — LAMOTRIGINE 200 MG/1
TABLET ORAL
Qty: 180 TABLET | Refills: 3 | OUTPATIENT
Start: 2025-06-12

## 2025-06-29 DIAGNOSIS — M79.605 BILATERAL LEG PAIN: ICD-10-CM

## 2025-06-29 DIAGNOSIS — M79.604 BILATERAL LEG PAIN: ICD-10-CM

## 2025-06-29 DIAGNOSIS — R19.7 DIARRHEA, UNSPECIFIED TYPE: ICD-10-CM

## 2025-06-29 DIAGNOSIS — M26.629 TMJ SYNDROME: ICD-10-CM

## 2025-06-30 DIAGNOSIS — F31.10 BIPOLAR I DISORDER, MOST RECENT EPISODE MANIC (HCC): ICD-10-CM

## 2025-06-30 DIAGNOSIS — F31.30 BIPOLAR AFFECTIVE DISORDER, CURRENT EPISODE DEPRESSED, CURRENT EPISODE SEVERITY UNSPECIFIED (HCC): ICD-10-CM

## 2025-06-30 RX ORDER — DICYCLOMINE HYDROCHLORIDE 10 MG/1
10 CAPSULE ORAL 4 TIMES DAILY PRN
Qty: 30 CAPSULE | Refills: 0 | Status: SHIPPED | OUTPATIENT
Start: 2025-06-30

## 2025-06-30 NOTE — TELEPHONE ENCOUNTER
Reason for call:   [x] Refill   [] Prior Auth  [] Other:     Office:   [] PCP/Provider -   [x] Specialty/Provider - [facility]    Medication:   Lamotrigine 200 mg, 1 bid, 180  Quetiapine 100 mg, 1 qd, 90    Pharmacy:   [facility]    Local Pharmacy   Does the patient have enough for 3 days?   [] Yes   [] No - Send as HP to POD    Mail Away Pharmacy   Does the patient have enough for 10 days?   [x] Yes   [] No - Send as HP to POD

## 2025-07-01 RX ORDER — QUETIAPINE FUMARATE 100 MG/1
100 TABLET, FILM COATED ORAL DAILY
Qty: 90 TABLET | Refills: 0 | OUTPATIENT
Start: 2025-07-01

## 2025-07-01 RX ORDER — CYCLOBENZAPRINE HCL 10 MG
10 TABLET ORAL
Qty: 30 TABLET | Refills: 0 | Status: SHIPPED | OUTPATIENT
Start: 2025-07-01

## 2025-07-01 RX ORDER — LAMOTRIGINE 200 MG/1
TABLET ORAL
Qty: 180 TABLET | Refills: 0 | Status: SHIPPED | OUTPATIENT
Start: 2025-07-01 | End: 2025-07-08 | Stop reason: SDUPTHER

## 2025-07-01 RX ORDER — TRAMADOL HYDROCHLORIDE 50 MG/1
50 TABLET ORAL 2 TIMES DAILY PRN
Qty: 60 TABLET | Refills: 0 | Status: SHIPPED | OUTPATIENT
Start: 2025-07-01

## 2025-07-01 NOTE — TELEPHONE ENCOUNTER
Prescription sent for 90 day supply in April with 3 refills. She should have refills at the pharmacy. Thanks.

## 2025-07-07 ENCOUNTER — SOCIAL WORK (OUTPATIENT)
Dept: BEHAVIORAL/MENTAL HEALTH CLINIC | Facility: CLINIC | Age: 65
End: 2025-07-07
Payer: MEDICARE

## 2025-07-07 DIAGNOSIS — F41.9 ANXIETY DISORDER, UNSPECIFIED TYPE: Primary | ICD-10-CM

## 2025-07-07 DIAGNOSIS — F50.89 OTHER DISORDER OF EATING: ICD-10-CM

## 2025-07-07 DIAGNOSIS — F42.9 OBSESSIVE-COMPULSIVE DISORDER, UNSPECIFIED TYPE: ICD-10-CM

## 2025-07-07 DIAGNOSIS — F31.32 BIPOLAR I DISORDER, MOST RECENT EPISODE DEPRESSED, MODERATE (HCC): ICD-10-CM

## 2025-07-07 PROCEDURE — 90837 PSYTX W PT 60 MINUTES: CPT | Performed by: COUNSELOR

## 2025-07-07 NOTE — PSYCH
Behavioral Health Psychotherapy Progress Note    Psychotherapy Provided: Individual Psychotherapy     1. Anxiety disorder, unspecified type        2. Bipolar I disorder, most recent episode depressed, moderate (HCC)        3. Obsessive-compulsive disorder, unspecified type        4. Other disorder of eating            Goals addressed in session: Goal 1 and Goal 2     DATA:     Client shared about current events and status.  Reviewed treatment goals.  Explored any barriers to change and how to address them to move forward. Session focused on Ayleen's ongoing challenges with living with her sister and niece.  Ct reports that sister wants to increase her rent and continues to give her rules that are somewhat unreasonable.  Ayleen working into evening with her job as  at a [employer].  Ct reports liking job so far.  Ct working on managing stress, anxiety and mood.  Continue to build identification of needs and feelings, to build calm assertive adult:adult communication. Updated annual crisis plan, see form for details. Progress noted.  Celebrated successes and growth where noted.  Continue to process feelings, build insights and reinforce coping skills.      During this session, this clinician used the following therapeutic modalities: Cognitive Behavioral Therapy, Dialectical Behavior Therapy, Mindfulness-based Strategies, Motivational Interviewing, and Supportive Psychotherapy    Substance Abuse was addressed during this session. If the client is diagnosed with a co-occurring substance use disorder, please indicate any changes in the frequency or amount of use: remains committed to her sobriety. Stage of change for addressing substance use diagnoses: Maintenance    ASSESSMENT:  Ayleen Moralez presents with a Euthymic/ normal mood.   her affect is Normal range and intensity, which is congruent, with her mood and the content of the session. The client has made progress on their goals as evidenced by her  "engagement with treatment, openness to feedback, introspective process, building insights, and application of learned coping skills.  Client remains engaged and participative in session.  Client is appreciative of treatment support.  Ayleen Moralez presents with a none risk of suicide, none risk of self-harm, and none risk of harm to others.    For any risk assessment that surpasses a \"low\" rating, a safety plan must be developed.    A safety plan was indicated: no  If yes, describe in detail NA    PLAN: Between sessions, Ayleen Moralez will continue to work on above issues and treatment goals. At the next session, the therapist will use Cognitive Behavioral Therapy, Dialectical Behavior Therapy, Mindfulness-based Strategies, Motivational Interviewing, and Supportive Psychotherapy to address : above listed issues and focus of treatment goals.   RS monthly.     Behavioral Health Treatment Plan and Discharge Planning: Ayleen Moralez is aware of and agrees to continue to work on their treatment plan. They have identified and are working toward their discharge goals. yes    Depression Follow-up Plan Completed: Yes  Ct will continue with treatment plan, including regular counseling, and medication management visits as needed.  Ct will access warm lines or crisis supports that have been given, when needed.    Visit start and stop times:    Start Time: 1308  Stop Time: 1405  Total Visit Time: 57 minutes  "

## 2025-07-07 NOTE — BH CRISIS PLAN
Client Name: Ayleen Moralez       Client YOB: 1960    Jennifer Safety Plan      Creation Date: 7/7/25 Update Date: annual by 7/7/26   Created By: Inga Becker LPC Last Updated By: Inga Becker LPC      Step 1: Warning Signs:   Warning Signs   More anxious   Become more obsessive compulsive   Withdrawn            Step 2: Internal Coping Strategies:   Internal Coping Strategies   Listen to music            Step 3: People and social settings that provide distraction:   Name Contact Information   Sister Sherie GRAY            Step 4: People whom I can ask for help during a crisis:      Name Contact Information    See above       Step 5: Professionals or agencies I can contact during a crisis:      Clinican/Agency Name Phone Emergency Contact    [facility] [phone]         Inga Becker LPC, [place]           [phone]                                  [phone]   [facility] Emergency Department Emergency Department Phone Emergency Department Address    [address]        Crisis Phone Numbers:   Suicide Prevention Lifeline: Call or Text  593 Crisis Text Line: Text HOME to 933-831   Please note: Some [facility] do not have a separate number for Child/Adolescent specific crisis. If your county is not listed under Child/Adolescent, please call the adult number for your county      Adult Crisis Numbers: Child/Adolescent Crisis Numbers   [facility]: [phone] [facility]: [phone]   [facility]: [phone] [facility]: [phone]   [facility]: [phone] [place]: [phone]   [facility]: [phone] [place]: [phone]   [place] [facility]: [phone]   [facility]: [phone]   [facility]: [phone]   [facility]: [phone] (daytime) [phone] (after hours, weekends, holidays)      Step 6: Making the environment safer (plan for lethal means safety):   Patient did not  identify any lethal methods: Yes     Optional: What is most important to me and worth living for?   N/A     Jennifer Safety Plan. Kaitlin Tabares and Alf Solomon. Used with permission of the authors.

## 2025-07-08 DIAGNOSIS — F31.9 BIPOLAR 1 DISORDER (HCC): ICD-10-CM

## 2025-07-08 DIAGNOSIS — F31.10 BIPOLAR I DISORDER, MOST RECENT EPISODE MANIC (HCC): ICD-10-CM

## 2025-07-08 NOTE — TELEPHONE ENCOUNTER
Reason for call: NOT DUPLICATES her insurance un enrolled her and re enrolled her needs resent         [x] Refill   [] Prior Auth  [] Other:     Office:   [] PCP/Provider -   [x[facility]     Medication:   lamoTRIgine (LaMICtal) 200 MG tablet Summary: Lamotrigine 200m tablet in the morning , 1 tablet at night,        QUEtiapine (SEROquel) 300 mg tablet 300 mg, Oral, Daily    Quantity: 90 day    Pharmacy: [facility]   Does the patient have enough for 3 days?   [] Yes   [x] No - Send as HP to POD Does not want short supply    Mail Away Pharmacy   Does the patient have enough for 10 days?   [] Yes   [x] No - Send as HP to POD

## 2025-07-09 DIAGNOSIS — F31.10 BIPOLAR I DISORDER, MOST RECENT EPISODE MANIC (HCC): ICD-10-CM

## 2025-07-09 DIAGNOSIS — F39 MOOD DISORDER (HCC): Primary | ICD-10-CM

## 2025-07-09 RX ORDER — QUETIAPINE FUMARATE 300 MG/1
300 TABLET, FILM COATED ORAL DAILY
Qty: 90 TABLET | Refills: 0 | Status: SHIPPED | OUTPATIENT
Start: 2025-07-09 | End: 2025-07-10 | Stop reason: SDUPTHER

## 2025-07-09 RX ORDER — LAMOTRIGINE 200 MG/1
TABLET ORAL
Qty: 180 TABLET | Refills: 0 | Status: SHIPPED | OUTPATIENT
Start: 2025-07-09 | End: 2025-07-10 | Stop reason: SDUPTHER

## 2025-07-09 RX ORDER — LAMOTRIGINE 200 MG/1
TABLET ORAL
Qty: 180 TABLET | Refills: 0 | Status: CANCELLED | OUTPATIENT
Start: 2025-07-09

## 2025-07-09 RX ORDER — LAMOTRIGINE 200 MG/1
200 TABLET ORAL 2 TIMES DAILY
Qty: 28 TABLET | Refills: 0 | Status: SHIPPED | OUTPATIENT
Start: 2025-07-09

## 2025-07-09 NOTE — TELEPHONE ENCOUNTER
Pt states that she needs two week of Lamotrigine 200 mg. Pt states that [facility] is delayed. Pt is out of medication.    Reason for call:   [x] Refill   [] Prior Auth  [] Other:     Office:   [] PCP/Provider -   [x] Specialty/Provider - Psych     Medication: Lamotrigine    Dose/Frequency: 200 mg    Quantity: 28 tablets    Pharmacy:   [address] [place] [facility]. [phone]     Local Pharmacy   Does the patient have enough for 3 days?   [] Yes   [x] No - Send as HP to POD    Mail Away Pharmacy   Does the patient have enough for 10 days?   [] Yes   [] No - Send as HP to POD    
Short supply of Lamictal 200 mg sent to [facility]  
No

## 2025-07-10 DIAGNOSIS — F31.9 BIPOLAR 1 DISORDER (HCC): ICD-10-CM

## 2025-07-10 DIAGNOSIS — F31.10 BIPOLAR I DISORDER, MOST RECENT EPISODE MANIC (HCC): ICD-10-CM

## 2025-07-10 RX ORDER — QUETIAPINE FUMARATE 300 MG/1
300 TABLET, FILM COATED ORAL DAILY
Qty: 90 TABLET | Refills: 0 | Status: SHIPPED | OUTPATIENT
Start: 2025-07-10 | End: 2025-07-10 | Stop reason: SDUPTHER

## 2025-07-10 RX ORDER — LAMOTRIGINE 200 MG/1
TABLET ORAL
Qty: 180 TABLET | Refills: 0 | Status: SHIPPED | OUTPATIENT
Start: 2025-07-10 | End: 2025-07-15 | Stop reason: SDUPTHER

## 2025-07-10 RX ORDER — QUETIAPINE FUMARATE 300 MG/1
300 TABLET, FILM COATED ORAL DAILY
Qty: 90 TABLET | Refills: 0 | Status: SHIPPED | OUTPATIENT
Start: 2025-07-10 | End: 2025-07-15 | Stop reason: SDUPTHER

## 2025-07-10 NOTE — TELEPHONE ENCOUNTER
Patient called the phaacy and they said that they did not receive script and needs to be resent      Reason for call:   [x] Refill   [] Prior Auth  [] Other:     Office:   [] PCP/Provider -   [x] Specialty/Provider -  [facility]  Authorized By: Kimberly Mora    Medication: Seroquel    Dose/Frequency: 300 mg     Quantity: #90    Pharmacy: [facility]   Does the patient have enough for 3 days?   [] Yes   [] No - Send as HP to POD    Mail Away Pharmacy   Does the patient have enough for 10 days?   [] Yes   [x] No - Send as HP to POD

## 2025-07-10 NOTE — TELEPHONE ENCOUNTER
NOT A DUPLICATE needs sent to mail order    Reason for call:   [x] Refill   [] Prior Auth  [] Other:     Office:   [] PCP/Provider -   [x] Specialty/Provider - psychiatry    Medication: lamoTRIgine (LaMICtal) 200 MG tablet Lamotrigine 200m tablet in the morning , 1 tablet at night     QUEtiapine (SEROquel) 300 mg tablet Take 1 tablet (300 mg total) by mouth in the morning       Pharmacy: [facility] [address] [facility]   Does the patient have enough for 3 days?   [] Yes   [] No - Send as HP to POD    Mail Away Pharmacy   Does the patient have enough for 10 days?   [x] Yes   [] No - Send as HP to POD

## 2025-07-14 ENCOUNTER — TELEPHONE (OUTPATIENT)
Dept: PSYCHIATRY | Facility: CLINIC | Age: 65
End: 2025-07-14

## 2025-07-14 NOTE — TELEPHONE ENCOUNTER
Received and scanned fax from [facility] requesting clarification regarding Seroquel, 100 mg., 300 mg. & 400 mg.      Client has appt. scheduled tomorrow Tuesday 7/15 at 11:00 am with Kimberly Mora.

## 2025-07-14 NOTE — TELEPHONE ENCOUNTER
Spoke to [facility]ist [facility]  Last script for Seroquel sent in April directs Ayleen to take 100 MG in the am.  Clarified with pharmacist that Ayleen should be taking 300 MG in the am and a total of 500 MG in the pm (400 + 100).  Pharmacist is requesting a new script for the 100 MG dose and is also asking if provider can write on the script that Ayleen is taking 3 different doses as noted above.    Will refer to Gracie Sahu for review.

## 2025-07-15 ENCOUNTER — OFFICE VISIT (OUTPATIENT)
Dept: PSYCHIATRY | Facility: CLINIC | Age: 65
End: 2025-07-15
Payer: MEDICARE

## 2025-07-15 DIAGNOSIS — F31.30 BIPOLAR AFFECTIVE DISORDER, CURRENT EPISODE DEPRESSED, CURRENT EPISODE SEVERITY UNSPECIFIED (HCC): ICD-10-CM

## 2025-07-15 DIAGNOSIS — F41.9 ANXIETY DISORDER, UNSPECIFIED TYPE: Primary | ICD-10-CM

## 2025-07-15 DIAGNOSIS — F39 MOOD DISORDER (HCC): ICD-10-CM

## 2025-07-15 DIAGNOSIS — F31.9 BIPOLAR 1 DISORDER (HCC): ICD-10-CM

## 2025-07-15 DIAGNOSIS — F31.10 BIPOLAR I DISORDER, MOST RECENT EPISODE MANIC (HCC): ICD-10-CM

## 2025-07-15 DIAGNOSIS — F42.9 OBSESSIVE-COMPULSIVE DISORDER, UNSPECIFIED TYPE: ICD-10-CM

## 2025-07-15 PROCEDURE — 99214 OFFICE O/P EST MOD 30 MIN: CPT

## 2025-07-15 RX ORDER — QUETIAPINE FUMARATE 100 MG/1
TABLET, FILM COATED ORAL
Qty: 90 TABLET | Refills: 3 | Status: SHIPPED | OUTPATIENT
Start: 2025-07-15

## 2025-07-15 RX ORDER — QUETIAPINE FUMARATE 300 MG/1
300 TABLET, FILM COATED ORAL DAILY
Qty: 90 TABLET | Refills: 0 | Status: SHIPPED | OUTPATIENT
Start: 2025-07-15

## 2025-07-15 RX ORDER — FLUVOXAMINE MALEATE 100 MG
TABLET ORAL
Qty: 270 TABLET | Refills: 2 | Status: SHIPPED | OUTPATIENT
Start: 2025-07-15

## 2025-07-15 RX ORDER — QUETIAPINE FUMARATE 400 MG/1
400 TABLET, FILM COATED ORAL
Qty: 90 TABLET | Refills: 3 | Status: SHIPPED | OUTPATIENT
Start: 2025-07-15

## 2025-07-15 RX ORDER — LAMOTRIGINE 200 MG/1
TABLET ORAL
Qty: 180 TABLET | Refills: 0 | Status: SHIPPED | OUTPATIENT
Start: 2025-07-15 | End: 2025-07-22 | Stop reason: SDUPTHER

## 2025-07-17 ENCOUNTER — HOSPITAL ENCOUNTER (OUTPATIENT)
Dept: NON INVASIVE DIAGNOSTICS | Facility: HOSPITAL | Age: 65
Discharge: HOME | End: 2025-07-17
Attending: EMERGENCY MEDICINE
Payer: MEDICARE

## 2025-07-17 VITALS
SYSTOLIC BLOOD PRESSURE: 124 MMHG | WEIGHT: 185 LBS | HEIGHT: 67 IN | HEART RATE: 71 BPM | DIASTOLIC BLOOD PRESSURE: 68 MMHG | BODY MASS INDEX: 29.03 KG/M2

## 2025-07-17 DIAGNOSIS — I31.39 OTHER PERICARDIAL EFFUSION (NONINFLAMMATORY): ICD-10-CM

## 2025-07-17 LAB
AORTIC ROOT: 3.1 CM
AORTIC VALVE MEAN VELOCITY: 20.2 M/S
ASCENDING AORTA: 2.9 CM
AV AREA BY CONTINUOUS VTI: 1.3 CM2
AV AREA PEAK VELOCITY: 1.3 CM2
AV LVOT MEAN GRADIENT: 4 MMHG
AV LVOT PEAK GRADIENT: 7 MMHG
AV MEAN PRESS GRAD SYS DOP V1V2: 18 MMHG
AV ORIFICE AREA US: 1.27 CM2
AV PEAK GRADIENT: 31 MMHG
AV VELOCITY RATIO: 0.45
AV VMAX SYS DOP: 2.8 M/S
BSA FOR ECHO PROCEDURE: 1.96 M2
DOP CALC AO VTI: 68.33 CM
DOP CALC LVOT AREA: 2.83 CM2
DOP CALC LVOT CARDIAC INDEX: 3.01 L/MIN/M2
DOP CALC LVOT CARDIAC OUTPUT: 5.9 L/MIN
DOP CALC LVOT DIAMETER: 1.9 CM
DOP CALC LVOT PEAK VEL VTI: 30.58 CM
DOP CALC LVOT PEAK VEL: 1.31 M/S
DOP CALC LVOT STROKE INDEX: 42.9 ML/M2
DOP CALC LVOT STROKE VOLUME: 86.66
E WAVE DECELERATION TIME: 217 MS
E/A RATIO: 0.92
FRACTIONAL SHORTENING: 33 (ref 28–44)
INTERVENTRICULAR SEPTUM IN DIASTOLE (PARASTERNAL SHORT AXIS VIEW): 1.3 CM
INTERVENTRICULAR SEPTUM: 1.3 CM (ref 0.6–1.1)
LAAS-AP2: 28.2 CM2
LAAS-AP4: 28 CM2
LEFT ATRIUM SIZE: 4 CM
LEFT ATRIUM VOLUME (MOD BIPLANE): 104 ML
LEFT ATRIUM VOLUME INDEX (MOD BIPLANE): 53.1 ML/M2
LEFT INTERNAL DIMENSION IN SYSTOLE: 2.8 CM (ref 2.1–4)
LEFT VENTRICLE DIASTOLIC VOLUME (MOD BIPLANE): 134 ML
LEFT VENTRICLE DIASTOLIC VOLUME INDEX (MOD BIPLANE): 68.4 ML/M2
LEFT VENTRICLE SYSTOLIC VOLUME (MOD BIPLANE): 64 ML
LEFT VENTRICLE SYSTOLIC VOLUME INDEX (MOD BIPLANE): 32.7 ML/M2
LEFT VENTRICULAR INTERNAL DIMENSION IN DIASTOLE: 4.2 CM (ref 3.5–6)
LEFT VENTRICULAR POSTERIOR WALL IN END DIASTOLE: 1.1 CM
LEFT VENTRICULAR STROKE VOLUME: 47 ML
LV EF BIPLANE MOD: 52 %
LV EF US.2D.A4C+ESTIMATED: 52 %
LVSV (TEICH): 47 ML
MV E'TISSUE VEL-LAT: 10 CM/S
MV E'TISSUE VEL-SEP: 7 CM/S
MV PEAK A VEL: 1.02 M/S
MV PEAK E VEL: 94 CM/S
MV STENOSIS PRESSURE HALF TIME: 63 MS
MV VALVE AREA P 1/2 METHOD: 3.49
RIGHT ATRIAL 2D VOLUME: 32 ML
RIGHT ATRIUM AREA SYSTOLE A4C: 13.7 CM2
RIGHT VENTRICLE ID DIMENSION: 3.1 CM
SL CV LEFT ATRIUM LENGTH A2C: 6.4 CM
SL CV LV EF: 60
SL CV PED ECHO LEFT VENTRICLE DIASTOLIC VOLUME (MOD BIPLANE) 2D: 77 ML
SL CV PED ECHO LEFT VENTRICLE SYSTOLIC VOLUME (MOD BIPLANE) 2D: 30 ML
TR MAX PG: 29 MMHG
TR PEAK VELOCITY: 2.7 M/S
TRICUSPID ANNULAR PLANE SYSTOLIC EXCURSION: 2 CM
TRICUSPID VALVE PEAK REGURGITATION VELOCITY: 2.69 M/S

## 2025-07-17 PROCEDURE — 93306 TTE W/DOPPLER COMPLETE: CPT

## 2025-07-17 PROCEDURE — 93306 TTE W/DOPPLER COMPLETE: CPT | Performed by: INTERNAL MEDICINE

## 2025-07-22 DIAGNOSIS — F31.10 BIPOLAR I DISORDER, MOST RECENT EPISODE MANIC (HCC): ICD-10-CM

## 2025-07-22 RX ORDER — LAMOTRIGINE 200 MG/1
200 TABLET ORAL DAILY
Qty: 14 TABLET | Refills: 0 | Status: SHIPPED | OUTPATIENT
Start: 2025-07-22

## 2025-07-22 RX ORDER — LAMOTRIGINE 200 MG/1
TABLET ORAL
Qty: 180 TABLET | Refills: 0 | Status: SHIPPED | OUTPATIENT
Start: 2025-07-22

## 2025-07-22 NOTE — TELEPHONE ENCOUNTER
Partial fill deleted Rx sent to [facility] please resend    Reason for call:   [x] Refill   [] Prior Auth  [] Other:     Office:   [] PCP/Provider -   [x] Specialty/Provider - psych/[facility]    Medication: Lamotrigine 200mg    Dose/Frequency: 1 tab am and hs    Quantity: 180    Pharmacy: [facility] - [address] [phone]     Medication: Lamotrigine 200mg    Dose/Frequency: 1 tab am and hs    Quantity: 28    Pharmacy: [facility] - [facility] [address] [facility] [phone]     Local Pharmacy   Does the patient have enough for 3 days?   [] Yes   [x] No - Send as HP to POD

## 2025-07-25 ENCOUNTER — TELEPHONE (OUTPATIENT)
Dept: PSYCHIATRY | Facility: CLINIC | Age: 65
End: 2025-07-25

## 2025-07-25 NOTE — TELEPHONE ENCOUNTER
Writer left voicemail for client informing her of Kimberly Mora's Thursdays switching to virtual only.    Writer asked client to call back and confirm change to virtual or reschedule for Tuesday/Wednesday.

## 2025-07-28 ENCOUNTER — TELEPHONE (OUTPATIENT)
Age: 65
End: 2025-07-28

## 2025-08-01 DIAGNOSIS — L23.9 ALLERGIC CONTACT DERMATITIS, UNSPECIFIED TRIGGER: Primary | ICD-10-CM

## 2025-08-01 RX ORDER — MOMETASONE FUROATE 1 MG/G
CREAM TOPICAL DAILY
Qty: 45 G | Refills: 5 | Status: SHIPPED | OUTPATIENT
Start: 2025-08-01 | End: 2025-08-04 | Stop reason: SDUPTHER

## 2025-08-04 ENCOUNTER — TELEPHONE (OUTPATIENT)
Age: 65
End: 2025-08-04

## 2025-08-04 DIAGNOSIS — M26.629 TMJ SYNDROME: ICD-10-CM

## 2025-08-04 DIAGNOSIS — M79.604 BILATERAL LEG PAIN: ICD-10-CM

## 2025-08-04 DIAGNOSIS — M79.605 BILATERAL LEG PAIN: ICD-10-CM

## 2025-08-04 DIAGNOSIS — F31.81 BIPOLAR 2 DISORDER (HCC): Chronic | ICD-10-CM

## 2025-08-04 DIAGNOSIS — L23.9 ALLERGIC CONTACT DERMATITIS, UNSPECIFIED TRIGGER: ICD-10-CM

## 2025-08-04 DIAGNOSIS — R19.7 DIARRHEA, UNSPECIFIED TYPE: ICD-10-CM

## 2025-08-04 RX ORDER — CYCLOBENZAPRINE HCL 10 MG
10 TABLET ORAL
Qty: 30 TABLET | Refills: 0 | Status: SHIPPED | OUTPATIENT
Start: 2025-08-04

## 2025-08-04 RX ORDER — CLONAZEPAM 0.5 MG/1
0.5 TABLET ORAL 3 TIMES DAILY
Qty: 270 TABLET | Refills: 0 | Status: SHIPPED | OUTPATIENT
Start: 2025-08-04

## 2025-08-04 RX ORDER — MOMETASONE FUROATE 1 MG/G
CREAM TOPICAL DAILY
Qty: 45 G | Refills: 5 | Status: SHIPPED | OUTPATIENT
Start: 2025-08-04

## 2025-08-04 RX ORDER — DICYCLOMINE HYDROCHLORIDE 10 MG/1
10 CAPSULE ORAL 4 TIMES DAILY PRN
Qty: 30 CAPSULE | Refills: 0 | Status: SHIPPED | OUTPATIENT
Start: 2025-08-04

## 2025-08-04 RX ORDER — TRAMADOL HYDROCHLORIDE 50 MG/1
50 TABLET ORAL 2 TIMES DAILY PRN
Qty: 60 TABLET | Refills: 0 | Status: SHIPPED | OUTPATIENT
Start: 2025-08-04

## 2025-08-11 ENCOUNTER — TELEPHONE (OUTPATIENT)
Dept: NEUROLOGY | Facility: CLINIC | Age: 65
End: 2025-08-11

## (undated) DEVICE — ACE WRAP 4 IN UNSTERILE

## (undated) DEVICE — SYRINGE 30ML LL

## (undated) DEVICE — CHLORAPREP HI-LITE 26ML ORANGE

## (undated) DEVICE — Device: Brand: DEFENDO AIR/WATER/SUCTION AND BIOPSY VALVE

## (undated) DEVICE — MICRO DUAL CUT (7.0 X 0.38 X 15.0MM)

## (undated) DEVICE — GAUZE SPONGES,16 PLY: Brand: CURITY

## (undated) DEVICE — INTENDED FOR TISSUE SEPARATION, AND OTHER PROCEDURES THAT REQUIRE A SHARP SURGICAL BLADE TO PUNCTURE OR CUT.: Brand: BARD-PARKER ® CARBON RIB-BACK BLADES

## (undated) DEVICE — SAW BLADE RECIPROCATING 179

## (undated) DEVICE — CANNULA SEAL

## (undated) DEVICE — 2.0MM DRILL BIT/QC/100MM

## (undated) DEVICE — CAPIT KNEE ATTUNE RP W/DOM

## (undated) DEVICE — ABDOMINAL PAD: Brand: DERMACEA

## (undated) DEVICE — NEEDLE 25G X 1 1/2

## (undated) DEVICE — ACE WRAP 6 IN UNSTERILE

## (undated) DEVICE — 3000CC GUARDIAN II: Brand: GUARDIAN

## (undated) DEVICE — PLUMEPEN PRO 10FT

## (undated) DEVICE — NEEDLE 25G X 5/8

## (undated) DEVICE — VESSEL LOOPS X-RAY DETECTABLE: Brand: DEROYAL

## (undated) DEVICE — PAD GROUNDING ADULT

## (undated) DEVICE — INTENDED FOR TISSUE SEPARATION, AND OTHER PROCEDURES THAT REQUIRE A SHARP SURGICAL BLADE TO PUNCTURE OR CUT.: Brand: BARD-PARKER SAFETY BLADES SIZE 10, STERILE

## (undated) DEVICE — SPLINT 3 X 12 IN PRECUT SYNTHETIC

## (undated) DEVICE — CUFF TOURNIQUET 18 X 4 IN QUICK CONNECT DISP 1 BLADDER

## (undated) DEVICE — GLOVE INDICATOR PI UNDERGLOVE SZ 8 BLUE

## (undated) DEVICE — BLADE SAGITTAL 25.6 X 9.5MM

## (undated) DEVICE — ACCESS PLATFORM FOR MINIMALLY INVASIVE SURGERY.: Brand: GELPORT® LAPAROSCOPIC  SYSTEM

## (undated) DEVICE — TUBING SUCTION 5MM X 12 FT

## (undated) DEVICE — BAG DECANTER

## (undated) DEVICE — LIGACLIP MCA MULTIPLE CLIP APPLIERS, 20 MEDIUM CLIPS: Brand: LIGACLIP

## (undated) DEVICE — YELLOW BOAT

## (undated) DEVICE — INTENDED FOR TISSUE SEPARATION, AND OTHER PROCEDURES THAT REQUIRE A SHARP SURGICAL BLADE TO PUNCTURE OR CUT.: Brand: BARD-PARKER SAFETY BLADES SIZE 15, STERILE

## (undated) DEVICE — SUT SILK 0 30 IN A306H

## (undated) DEVICE — INTENDED FOR TISSUE SEPARATION, AND OTHER PROCEDURES THAT REQUIRE A SHARP SURGICAL BLADE TO PUNCTURE OR CUT.: Brand: BARD-PARKER SAFETY BLADES SIZE 11, STERILE

## (undated) DEVICE — ARTHREX WIRE 0.062 IN ORTHO TROC TIP
Type: IMPLANTABLE DEVICE | Site: FOOT | Status: NON-FUNCTIONAL
Removed: 2021-09-27

## (undated) DEVICE — CURITY STRETCH BANDAGE: Brand: CURITY

## (undated) DEVICE — LUBRICANT INST ELECTROLUBE ANTISTK WO PAD

## (undated) DEVICE — SUT VICRYL 3-0 SH 27 IN J416H

## (undated) DEVICE — GLOVE INDICATOR PI UNDERGLOVE SZ 7 BLUE

## (undated) DEVICE — ANTIBACTERIAL VIOLET BRAIDED (POLYGLACTIN 910), SYNTHETIC ABSORBABLE SURGICAL SUTURE: Brand: COATED VICRYL

## (undated) DEVICE — TONGUE DEPRESSOR STERILE

## (undated) DEVICE — SUT STRATAFIX SPIRAL 3-0 PGA/PCL 30 X 30 CM SXMD2B408

## (undated) DEVICE — COBAN 6 IN STERILE

## (undated) DEVICE — OCCLUSIVE GAUZE STRIP,3% BISMUTH TRIBROMOPHENATE IN PETROLATUM BLEND: Brand: XEROFORM

## (undated) DEVICE — WIRE 0.86MM  DBL ENDED TROCAR TIP
Type: IMPLANTABLE DEVICE | Site: FOOT | Status: NON-FUNCTIONAL
Removed: 2021-09-27

## (undated) DEVICE — SPONGE SCRUB 4 PCT CHLORHEXIDINE

## (undated) DEVICE — 3M™ STERI-STRIP™ REINFORCED ADHESIVE SKIN CLOSURES, R1547, 1/2 IN X 4 IN (12 MM X 100 MM), 6 STRIPS/ENVELOPE: Brand: 3M™ STERI-STRIP™

## (undated) DEVICE — SYRINGE 10ML LL

## (undated) DEVICE — TIP-UP FENESTRATED GRASPER: Brand: ENDOWRIST

## (undated) DEVICE — IV CATH INTROCAN 18G X 1 1/4 SAFETY

## (undated) DEVICE — SUT VICRYL 2-0 SH 27 IN UNDYED J417H

## (undated) DEVICE — BANDAGE, ESMARK LF STR 4"X9'(20/CS): Brand: CYPRESS

## (undated) DEVICE — U-DRAPE: Brand: CONVERTORS

## (undated) DEVICE — BLADE REAMER PATELLA 41MM

## (undated) DEVICE — 3M™ DURAPORE™ SURGICAL TAPE 1538-1, 1 INCH X 10 YARD (2,5CM X 9,1M), 12 ROLLS/BOX: Brand: 3M™ DURAPORE™

## (undated) DEVICE — MEDI-VAC YANKAUER SUCTION HANDLE W/BULBOUS AND CONTROL VENT: Brand: CARDINAL HEALTH

## (undated) DEVICE — FOGARTY SPRING CLIPS 6MM: Brand: FOGARTY SOFTJAW

## (undated) DEVICE — 3M™ STERI-STRIP™ COMPOUND BENZOIN TINCTURE 40 BAGS/CARTON 4 CARTONS/CASE C1544: Brand: 3M™ STERI-STRIP™

## (undated) DEVICE — DRAPE C-ARM 48 X 84 IN F/ OEC MINIVIEW 6800

## (undated) DEVICE — FENESTRATED BIPOLAR FORCEPS: Brand: ENDOWRIST

## (undated) DEVICE — BETHLEHEM UNIV MAJ EXT ,KIT: Brand: CARDINAL HEALTH

## (undated) DEVICE — BETHLEHEM UNIVERSAL  MIONR EXT: Brand: CARDINAL HEALTH

## (undated) DEVICE — SUT PROLENE 6-0 BV130 30 IN 8709H

## (undated) DEVICE — 10FR FRAZIER SUCTION HANDLE: Brand: CARDINAL HEALTH

## (undated) DEVICE — SUT PROLENE 2-0 KS 30 IN 8623H

## (undated) DEVICE — STANDARD SURGICAL GOWN, L: Brand: CONVERTORS

## (undated) DEVICE — FINGER STRIPS: Brand: DEROYAL

## (undated) DEVICE — SPHINCTEROTOME: Brand: DREAMTOME™ RX 44

## (undated) DEVICE — DRESSING MEPILEX AG BORDER POST-OP 4 X 8 IN

## (undated) DEVICE — PENCIL ELECTROSURG E-Z CLEAN -0035H

## (undated) DEVICE — SUTURE BOOTS YELLOW

## (undated) DEVICE — 2.4MM CORTEX SCREW SLF-TPNG WITH T8 STARDRIVE RECESS 18MM
Type: IMPLANTABLE DEVICE | Site: WRIST | Status: NON-FUNCTIONAL
Removed: 2021-10-11

## (undated) DEVICE — SUT ETHILON 4-0 PS-2 18 IN 1667H

## (undated) DEVICE — NEEDLE 18 G X 1 1/2

## (undated) DEVICE — SPONGE STICK WITH PVP-I: Brand: KENDALL

## (undated) DEVICE — GLOVE SRG BIOGEL 7.5

## (undated) DEVICE — SPLINT 4 X 15 IN FAST SET PLASTER

## (undated) DEVICE — DRESSING MEPILEX AG BORDER POST-OP 4 X 6 IN

## (undated) DEVICE — GUIDEWIRE 0.045 IN TROCAR TIP

## (undated) DEVICE — PETRI DISH STERILE

## (undated) DEVICE — PACK MAJOR ORTHO W/SPLITS PBDS

## (undated) DEVICE — ARM DRAPE

## (undated) DEVICE — LOCKING DEVICE AND BIOPSY CAP FOR USE WITH RX BILIARY SYSTEM: Brand: RX LOCKING DEVICE AND BIOPSY CAP

## (undated) DEVICE — 1.8MM DRILL BIT WITH DEPTH MARK/QC/110MM

## (undated) DEVICE — STERILE BETHLEHEM PLASTIC HAND: Brand: CARDINAL HEALTH

## (undated) DEVICE — COLUMN DRAPE

## (undated) DEVICE — CURITY NON-ADHERENT STRIPS: Brand: CURITY

## (undated) DEVICE — SCD SEQUENTIAL COMPRESSION COMFORT SLEEVE MEDIUM KNEE LENGTH: Brand: KENDALL SCD

## (undated) DEVICE — UNDYED BRAIDED (POLYGLACTIN 910), SYNTHETIC ABSORBABLE SUTURE: Brand: COATED VICRYL

## (undated) DEVICE — SUT MONOCRYL 3-0 SH 27 IN Y416H

## (undated) DEVICE — BULB SYRINGE,IRRIGATION WITH PROTECTIVE CAP: Brand: DOVER

## (undated) DEVICE — DRILL BIT 2.7MM CALIBRATED

## (undated) DEVICE — GLOVE SRG BIOGEL 8

## (undated) DEVICE — CO2 AND WATER TUBING/CAP SET FOR OLYMPUS® SCOPES & UCR: Brand: ERBE

## (undated) DEVICE — ANTIBACTERIAL UNDYED BRAIDED (POLYGLACTIN 910), SYNTHETIC ABSORBABLE SUTURE: Brand: COATED VICRYL

## (undated) DEVICE — HANDPIECE SET WITH RETRACTABLE COAXIAL FAN SPRAY TIP AND SUCTION TUBE: Brand: INTERPULSE

## (undated) DEVICE — NEEDLE HYPO 22G X 1-1/2 IN

## (undated) DEVICE — WIRE 0.86MM TROCAR TIP

## (undated) DEVICE — SUT STRATAFIX SPIRAL 4-0 PGA/PCL 30 X 30 CM SXMD2B409

## (undated) DEVICE — SUT ETHIBOND 2-0 V-7 30 IN B964H

## (undated) DEVICE — SPONGE PVP SCRUB WING STERILE

## (undated) DEVICE — GLOVE SRG BIOGEL ORTHOPEDIC 7.5

## (undated) DEVICE — ADHESIVE SKN CLSR HISTOACRYL FLEX 0.5ML LF

## (undated) DEVICE — SUT PROLENE 4-0 PC-3 18 IN 8634G

## (undated) DEVICE — K-WIRE 0.9MM X 9INL SMTH XMOND/PLAIN
Type: IMPLANTABLE DEVICE | Site: WRIST | Status: NON-FUNCTIONAL
Removed: 2021-10-11

## (undated) DEVICE — ARM SLING: Brand: DEROYAL

## (undated) DEVICE — SUT VICRYL 4-0 PS-2 18 IN J496G

## (undated) DEVICE — STRL COTTON TIP APPLCTR 6IN PK: Brand: CARDINAL HEALTH

## (undated) DEVICE — 450 ML BOTTLE OF 0.05% CHLORHEXIDINE GLUCONATE IN 99.95% STERILE WATER FOR IRRIGATION, USP AND APPLICATOR.: Brand: IRRISEPT ANTIMICROBIAL WOUND LAVAGE

## (undated) DEVICE — DRILL BIT 2MM CALIBRATED

## (undated) DEVICE — MONOPOLAR CURVED SCISSORS: Brand: ENDOWRIST

## (undated) DEVICE — MAYO STAND COVER: Brand: CONVERTORS

## (undated) DEVICE — CURVED INTRALUMINAL STAPLER (ILS) 24 TITANIUM ADJUSTABLE HEIGHT STAPLES

## (undated) DEVICE — DRESSING XEROFORM 5 X 9

## (undated) DEVICE — SYRINGE 10ML LL CONTROL TOP

## (undated) DEVICE — GLOVE SRG BIOGEL 7

## (undated) DEVICE — SUT VICRYL 2-0 CT-2 27 IN J269H

## (undated) DEVICE — KERLIX BANDAGE ROLL: Brand: KERLIX

## (undated) DEVICE — SUT ETHIBOND 3-0 18 IN CX13D

## (undated) DEVICE — X-RAY DETECTABLE SPONGES,16 PLY: Brand: VISTEC

## (undated) DEVICE — SUT PDS II 1 CTX 36 IN Z371T

## (undated) DEVICE — ENSEAL LAPAROSCOPIC TISSUE SEALER G2 CURVED JAW FOR USE WITH G2 GENERATOR 5MM DIAMETER 35CM SHAFT LENGTH: Brand: ENSEAL

## (undated) DEVICE — SUT VICRYL 2-0 REEL 54 IN J286G

## (undated) DEVICE — STERI DRAPE 1000 NON-STERILE ROLL

## (undated) DEVICE — LIGACLIP MCA MULTIPLE CLIP APPLIERS, 20 SMALL CLIPS: Brand: LIGACLIP

## (undated) DEVICE — 2000CC GUARDIAN II: Brand: GUARDIAN

## (undated) DEVICE — INJECTION SYTEM RAPID REFILL RX

## (undated) DEVICE — BANDAGE, ESMARK LF STR 6"X9' (20/CS): Brand: CYPRESS

## (undated) DEVICE — SUT SILK 3-0 30 IN A304H

## (undated) DEVICE — TIP COVER ACCESSORY

## (undated) DEVICE — SUT PROLENE 0 CT-2 30 IN 8412H

## (undated) DEVICE — HEAVY DUTY TABLE COVER: Brand: CONVERTORS

## (undated) DEVICE — SUT SILK 4-0 30 IN A303H

## (undated) DEVICE — BLADE INTREX LRG BONE OSCILLATING

## (undated) DEVICE — SUT VICRYL 0 CT-1 27 IN J260H

## (undated) DEVICE — SUT MONOCRYL 4-0 PS-2 18 IN Y496G

## (undated) DEVICE — SUT SILK 2-0 30 IN A305H

## (undated) DEVICE — DRAPE FLUID WARMER (BIRD BATH)

## (undated) DEVICE — HOOD: Brand: FLYTE, SURGICOOL

## (undated) DEVICE — SUT PROLENE 2-0 SH 36 IN 8523H

## (undated) DEVICE — CONTOUR CURVED CUTTER STAPLER: Brand: CONTOUR

## (undated) DEVICE — WIREGUIDED CYTOLOGY BRUSH: Brand: RX CYTOLOGY BRUSH

## (undated) DEVICE — SUT VICRYL 0 REEL 54 IN J287G

## (undated) DEVICE — SINGLE-USE BIOPSY FORCEPS: Brand: RADIAL JAW 4

## (undated) DEVICE — SPLINT 5 X 30 IN XFAST SET PLASTER

## (undated) DEVICE — PACK ROBOTIC PROSTATE PBDS DA VINCI SI/XI

## (undated) DEVICE — CAST PADDING 6 IN STERILE

## (undated) DEVICE — 3M™ IOBAN™ 2 ANTIMICROBIAL INCISE DRAPE 6650EZ: Brand: IOBAN™ 2

## (undated) DEVICE — SURGICEL FIBRILLAR 1 X 2

## (undated) DEVICE — DRAPE SHEET THREE QUARTER

## (undated) DEVICE — ASTOUND STANDARD SURGICAL GOWN, XXL: Brand: CONVERTORS

## (undated) DEVICE — 3M™ IOBAN™ 2 ANTIMICROBIAL INCISE DRAPE 6648EZ: Brand: IOBAN™ 2

## (undated) DEVICE — BLADE ACL FINE 9.5X25.5X0.4MM

## (undated) DEVICE — SUT MONOCRYL 4-0 PS-2 27 IN Y426H

## (undated) DEVICE — 3 BONE CEMENT MIXER: Brand: MIXEVAC

## (undated) DEVICE — GLOVE SRG BIOGEL 6.5

## (undated) DEVICE — GLOVE INDICATOR PI UNDERGLOVE SZ 7.5 BLUE

## (undated) DEVICE — CLAMP TOWEL TUBING DISPOSABLE

## (undated) DEVICE — SUT SILK 2-0 TIES 144 IN LA55G

## (undated) DEVICE — TRAY FOLEY 16FR URIMETER SURESTEP

## (undated) DEVICE — MICRO DUAL CUT (9.0 X 0.38 X 18.5MM)

## (undated) DEVICE — 3M™ TEGADERM™ TRANSPARENT FILM DRESSING FRAME STYLE, 1624W, 2-3/8 IN X 2-3/4 IN (6 CM X 7 CM), 100/CT 4CT/CASE: Brand: 3M™ TEGADERM™

## (undated) DEVICE — 5065 IMPAD REGULAR PAIR: Brand: A-V IMPULSE

## (undated) DEVICE — SYRINGE 20ML LL

## (undated) DEVICE — PAD CAST 6 IN COTTON NON STERILE

## (undated) DEVICE — SUT ETHILON 3-0 PS-1 18 IN 1663H